# Patient Record
Sex: MALE | Race: WHITE | NOT HISPANIC OR LATINO | Employment: FULL TIME | URBAN - METROPOLITAN AREA
[De-identification: names, ages, dates, MRNs, and addresses within clinical notes are randomized per-mention and may not be internally consistent; named-entity substitution may affect disease eponyms.]

---

## 2017-01-05 ENCOUNTER — OFFICE VISIT (OUTPATIENT)
Dept: INTERNAL MEDICINE | Facility: CLINIC | Age: 57
End: 2017-01-05
Payer: COMMERCIAL

## 2017-01-05 VITALS
HEIGHT: 71 IN | WEIGHT: 190.94 LBS | DIASTOLIC BLOOD PRESSURE: 90 MMHG | SYSTOLIC BLOOD PRESSURE: 144 MMHG | BODY MASS INDEX: 26.73 KG/M2 | OXYGEN SATURATION: 98 % | HEART RATE: 68 BPM

## 2017-01-05 DIAGNOSIS — Z12.11 SCREENING FOR COLON CANCER: ICD-10-CM

## 2017-01-05 DIAGNOSIS — Z12.5 PROSTATE CANCER SCREENING: ICD-10-CM

## 2017-01-05 DIAGNOSIS — D64.9 CHRONIC ANEMIA: ICD-10-CM

## 2017-01-05 DIAGNOSIS — I10 ESSENTIAL HYPERTENSION: Primary | ICD-10-CM

## 2017-01-05 PROCEDURE — 99214 OFFICE O/P EST MOD 30 MIN: CPT | Mod: S$GLB,,, | Performed by: INTERNAL MEDICINE

## 2017-01-05 PROCEDURE — 99999 PR PBB SHADOW E&M-EST. PATIENT-LVL III: CPT | Mod: PBBFAC,,, | Performed by: INTERNAL MEDICINE

## 2017-01-05 PROCEDURE — 3077F SYST BP >= 140 MM HG: CPT | Mod: S$GLB,,, | Performed by: INTERNAL MEDICINE

## 2017-01-05 PROCEDURE — 1159F MED LIST DOCD IN RCRD: CPT | Mod: S$GLB,,, | Performed by: INTERNAL MEDICINE

## 2017-01-05 PROCEDURE — 3080F DIAST BP >= 90 MM HG: CPT | Mod: S$GLB,,, | Performed by: INTERNAL MEDICINE

## 2017-01-05 RX ORDER — METOPROLOL SUCCINATE 25 MG/1
25 TABLET, EXTENDED RELEASE ORAL DAILY
Qty: 90 TABLET | Refills: 3 | Status: SHIPPED | OUTPATIENT
Start: 2017-01-05 | End: 2018-01-26 | Stop reason: SDUPTHER

## 2017-01-05 RX ORDER — HYDROCHLOROTHIAZIDE 25 MG/1
25 TABLET ORAL DAILY
Qty: 30 TABLET | Refills: 11 | Status: SHIPPED | OUTPATIENT
Start: 2017-01-05 | End: 2017-09-11 | Stop reason: SDUPTHER

## 2017-01-05 RX ORDER — LOSARTAN POTASSIUM 100 MG/1
100 TABLET ORAL DAILY
Qty: 90 TABLET | Refills: 3 | Status: SHIPPED | OUTPATIENT
Start: 2017-01-05 | End: 2017-12-08 | Stop reason: SDUPTHER

## 2017-01-05 NOTE — PATIENT INSTRUCTIONS
Hydrochlorothiazide Oral capsule  · For the first week on this take 1/2 tablet once daily  · If side effect develops on 1/2 tablet, wait an additional week for it to go away and then increase the dose   · After 1 week if no side effects start taking 1 whole tablet once daily     Watching blood pressure    · Check blood pressure daily for 2 weeks and then submit the numbers on patient portal.    · Wait until you have been on the full strength of HCTZ for at least 2 weeks      · The best time is first thing in the morning AND just prior to bedtime.    · Keep a log of your blood pressure.  Write down the date and time and the blood pressure number.      · Comment next to any abnormally high blood pressure numbers if you can tell that there was a circumstance that led to this value (for example, taking blood pressure when you were upset, in pain or right after having a cigarette or drinking coffee).     Avoid common mistakes when checking blood pressure  · Taking blood pressure over the top of clothing.  · Checking blood pressure repeatedly on the same arm (better to check on the other arm)  · Using blood pressure cuff that is too small (inflatable portion should cover at least 75% of your upper arm)    GOAL BLOOD PRESSURE:   Top number less than 140  Bottom number less than 90       What is this medicine?  HYDROCHLOROTHIAZIDE (crow droe klor oh THYE a zide) is a diuretic. It increases the amount of urine passed, which causes the body to lose salt and water. This medicine is used to treat high blood pressure. It is also reduces the swelling and water retention caused by various medical conditions, such as heart, liver, or kidney disease.  This medicine may be used for other purposes; ask your health care provider or pharmacist if you have questions.  What should I tell my health care provider before I take this medicine?  They need to know if you have any of these conditions:  · diabetes  · gout  · immune system  problems, like lupus  · kidney disease or kidney stones  · liver disease  · pancreatitis  · small amount of urine or difficulty passing urine  · an unusual or allergic reaction to hydrochlorothiazide, sulfa drugs, other medicines, foods, dyes, or preservatives    How should I use this medicine?  Take this medicine by mouth with a glass of water. Follow the directions on the prescription label. Take your medicine at regular intervals. Remember that you will need to pass urine frequently after taking this medicine. Do not take your doses at a time of day that will cause you problems. Do not stop taking your medicine unless your doctor tells you to.  Talk to your pediatrician regarding the use of this medicine in children. Special care may be needed.  Overdosage: If you think you have taken too much of this medicine contact a poison control center or emergency room at once.  NOTE: This medicine is only for you. Do not share this medicine with others.  What if I miss a dose?  If you miss a dose, take it as soon as you can. If it is almost time for your next dose, take only that dose. Do not take double or extra doses.    What should I watch for while using this medicine?  Visit your doctor or health care professional for regular checks on your progress. Check your blood pressure as directed. Ask your doctor or health care professional what your blood pressure should be and when you should contact him or her.  You may need to be on a special diet while taking this medicine. Ask your doctor.  Check with your doctor or health care professional if you get an attack of severe diarrhea, nausea and vomiting, or if you sweat a lot. The loss of too much body fluid can make it dangerous for you to take this medicine.  You may get drowsy or dizzy. Do not drive, use machinery, or do anything that needs mental alertness until you know how this medicine affects you. Do not stand or sit up quickly, especially if you are an older  patient. This reduces the risk of dizzy or fainting spells. Alcohol may interfere with the effect of this medicine. Avoid alcoholic drinks.  This medicine may affect your blood sugar level. If you have diabetes, check with your doctor or health care professional before changing the dose of your diabetic medicine.  This medicine can make you more sensitive to the sun. Keep out of the sun. If you cannot avoid being in the sun, wear protective clothing and use sunscreen. Do not use sun lamps or tanning beds/booths.  What side effects may I notice from receiving this medicine?  Side effects that you should report to your doctor or health care professional as soon as possible:  · allergic reactions such as skin rash or itching, hives, swelling of the lips, mouth, tongue, or throat  · changes in vision  · chest pain  · eye pain  · fast or irregular heartbeat  · feeling faint or lightheaded, falls  · gout attack  · muscle pain or cramps  · pain or difficulty when passing urine  · pain, tingling, numbness in the hands or feet  · redness, blistering, peeling or loosening of the skin, including inside the mouth  · unusually weak or tired  Side effects that usually do not require medical attention (report to your doctor or health care professional if they continue or are bothersome):  · change in sex drive or performance  · dry mouth  · headache  · stomach upset  This list may not describe all possible side effects. Call your doctor for medical advice about side effects. You may report side effects to FDA at 0-855-FDA-7926.  Where should I keep my medicine?  Keep out of the reach of children.  Store at room temperature between 15 and 30 degrees C (59 and 86 degrees F). Do not freeze. Protect from light and moisture. Keep container closed tightly. Throw away any unused medicine after the expiration date.  NOTE:This sheet is a summary. It may not cover all possible information. If you have questions about this medicine, talk to  your doctor, pharmacist, or health care provider. Copyright© 2016 [employer]

## 2017-01-05 NOTE — MR AVS SNAPSHOT
[facility]   [place]  [place] [address]  Phone: [phone]  Fax: [phone]                  Domingo Gross .   2017 4:20 PM   Office Visit    Description:  Male : 1960   Provider:  Sawyer Jones MD   Department:  [facility]           Reason for Visit     Medication Refill           Diagnoses this Visit        Comments    Essential hypertension    -  Primary     Chronic anemia         Prostate cancer screening                To Do List           Future Appointments        Provider Department Dept Phone    2017 9:30 AM [facility] [phone]    2017 8:00 AM Sawyer Jones MD [facility] [phone]      Goals (5 Years of Data)     None       These Medications        Disp Refills Start End    hydrochlorothiazide (HYDRODIURIL) 25 MG tablet 30 tablet 11 2017    Take 1 tablet (25 mg total) by mouth once daily. - Oral    Pharmacy: [facility] [address] [place] [address] [place] [address] AT [facility] #: [phone]         [facility]     [facility] -  Assistance  Registered nurses in the [facility] provide clinical advisement, health education, appointment booking, and other advisory services.  Call for this free service at [phone].             Medications           Message regarding Medications     Verify the changes and/or additions to your medication regime listed below are the same as discussed with your clinician today.  If any of these changes or additions are incorrect, please notify your healthcare provider.        START taking these NEW medications        Refills    hydrochlorothiazide (HYDRODIURIL) 25 MG tablet 11    Sig: Take 1 tablet (25 mg total) by mouth once daily.    Class: Normal    Route: Oral           Verify that the below list of medications is an accurate  "representation of the medications you are currently taking.  If none reported, the list may be blank. If incorrect, please contact your healthcare provider. Carry this list with you in case of emergency.           Current Medications     ASPIRIN (ASPIR-81 ORAL) Take 1 tablet by mouth.    atorvastatin (LIPITOR) 40 MG tablet Take 1 tablet (40 mg total) by mouth once daily.    clopidogrel (PLAVIX) 75 mg tablet Take 1 tablet (75 mg total) by mouth once daily.    losartan (COZAAR) 100 MG tablet Take 1 tablet (100 mg total) by mouth once daily.    metoprolol succinate (TOPROL-XL) 25 MG 24 hr tablet Take 1 tablet (25 mg total) by mouth once daily.    coenzyme Q10 (COQ-10) 100 mg capsule Take 1 capsule (100 mg total) by mouth once daily.    hydrochlorothiazide (HYDRODIURIL) 25 MG tablet Take 1 tablet (25 mg total) by mouth once daily.           Clinical Reference Information           Vital Signs - Last Recorded  Most recent update: 1/5/2017  4:26 PM by Evelyn Asif MA    BP Pulse Ht Wt SpO2 BMI    (!) 144/90 (BP Location: Right arm, Patient Position: Sitting, BP Method: Manual) 68 5' 11" (1.803 m) 86.6 kg (190 lb 14.7 oz) 98% 26.63 kg/m2      Blood Pressure          Most Recent Value    BP  (!)  144/90      Allergies as of 1/5/2017     Ace Inhibitors      Immunizations Administered on Date of Encounter - 1/5/2017     None      Orders Placed During Today's Visit     Future Labs/Procedures Expected by Expires    CBC auto differential  1/5/2017 4/5/2017    Comprehensive metabolic panel  1/5/2017 4/5/2017    Ferritin  1/5/2017 3/6/2018    Iron and TIBC  1/5/2017 4/5/2017    Lipid panel  1/5/2017 4/5/2017    PSA, Screening  1/5/2017 4/5/2017    Reticulocytes  1/5/2017 4/5/2017      Instructions      Hydrochlorothiazide Oral capsule  · For the first week on this take 1/2 tablet once daily  · If side effect develops on 1/2 tablet, wait an additional week for it to go away and then increase the dose   · After 1 week if no side " effects start taking 1 whole tablet once daily     Watching blood pressure    · Check blood pressure daily for 2 weeks and then submit the numbers on patient portal.    · Wait until you have been on the full strength of HCTZ for at least 2 weeks      · The best time is first thing in the morning AND just prior to bedtime.    · Keep a log of your blood pressure.  Write down the date and time and the blood pressure number.      · Comment next to any abnormally high blood pressure numbers if you can tell that there was a circumstance that led to this value (for example, taking blood pressure when you were upset, in pain or right after having a cigarette or drinking coffee).     Avoid common mistakes when checking blood pressure  · Taking blood pressure over the top of clothing.  · Checking blood pressure repeatedly on the same arm (better to check on the other arm)  · Using blood pressure cuff that is too small (inflatable portion should cover at least 75% of your upper arm)    GOAL BLOOD PRESSURE:   Top number less than 140  Bottom number less than 90       What is this medicine?  HYDROCHLOROTHIAZIDE (crow droe klor oh THYE a zide) is a diuretic. It increases the amount of urine passed, which causes the body to lose salt and water. This medicine is used to treat high blood pressure. It is also reduces the swelling and water retention caused by various medical conditions, such as heart, liver, or kidney disease.  This medicine may be used for other purposes; ask your health care provider or pharmacist if you have questions.  What should I tell my health care provider before I take this medicine?  They need to know if you have any of these conditions:  · diabetes  · gout  · immune system problems, like lupus  · kidney disease or kidney stones  · liver disease  · pancreatitis  · small amount of urine or difficulty passing urine  · an unusual or allergic reaction to hydrochlorothiazide, sulfa drugs, other medicines, foods,  dyes, or preservatives    How should I use this medicine?  Take this medicine by mouth with a glass of water. Follow the directions on the prescription label. Take your medicine at regular intervals. Remember that you will need to pass urine frequently after taking this medicine. Do not take your doses at a time of day that will cause you problems. Do not stop taking your medicine unless your doctor tells you to.  Talk to your pediatrician regarding the use of this medicine in children. Special care may be needed.  Overdosage: If you think you have taken too much of this medicine contact a poison control center or emergency room at once.  NOTE: This medicine is only for you. Do not share this medicine with others.  What if I miss a dose?  If you miss a dose, take it as soon as you can. If it is almost time for your next dose, take only that dose. Do not take double or extra doses.    What should I watch for while using this medicine?  Visit your doctor or health care professional for regular checks on your progress. Check your blood pressure as directed. Ask your doctor or health care professional what your blood pressure should be and when you should contact him or her.  You may need to be on a special diet while taking this medicine. Ask your doctor.  Check with your doctor or health care professional if you get an attack of severe diarrhea, nausea and vomiting, or if you sweat a lot. The loss of too much body fluid can make it dangerous for you to take this medicine.  You may get drowsy or dizzy. Do not drive, use machinery, or do anything that needs mental alertness until you know how this medicine affects you. Do not stand or sit up quickly, especially if you are an older patient. This reduces the risk of dizzy or fainting spells. Alcohol may interfere with the effect of this medicine. Avoid alcoholic drinks.  This medicine may affect your blood sugar level. If you have diabetes, check with your doctor or  health care professional before changing the dose of your diabetic medicine.  This medicine can make you more sensitive to the sun. Keep out of the sun. If you cannot avoid being in the sun, wear protective clothing and use sunscreen. Do not use sun lamps or tanning beds/booths.  What side effects may I notice from receiving this medicine?  Side effects that you should report to your doctor or health care professional as soon as possible:  · allergic reactions such as skin rash or itching, hives, swelling of the lips, mouth, tongue, or throat  · changes in vision  · chest pain  · eye pain  · fast or irregular heartbeat  · feeling faint or lightheaded, falls  · gout attack  · muscle pain or cramps  · pain or difficulty when passing urine  · pain, tingling, numbness in the hands or feet  · redness, blistering, peeling or loosening of the skin, including inside the mouth  · unusually weak or tired  Side effects that usually do not require medical attention (report to your doctor or health care professional if they continue or are bothersome):  · change in sex drive or performance  · dry mouth  · headache  · stomach upset  This list may not describe all possible side effects. Call your doctor for medical advice about side effects. You may report side effects to FDA at 9-834-FDA-2597.  Where should I keep my medicine?  Keep out of the reach of children.  Store at room temperature between 15 and 30 degrees C (59 and 86 degrees F). Do not freeze. Protect from light and moisture. Keep container closed tightly. Throw away any unused medicine after the expiration date.  NOTE:This sheet is a summary. It may not cover all possible information. If you have questions about this medicine, talk to your doctor, pharmacist, or health care provider. Copyright© 2016 [employer]

## 2017-01-06 ENCOUNTER — PATIENT MESSAGE (OUTPATIENT)
Dept: INTERNAL MEDICINE | Facility: CLINIC | Age: 57
End: 2017-01-06

## 2017-01-06 NOTE — PROGRESS NOTES
Portions of this note are generated with voice recognition software. Typographical errors may exist.     SUBJECTIVE:    This is a/an 56 y.o. male here for primary care visit for  Chief Complaint   Patient presents with    Medication Refill    Hypertension     Hypertension.  Patient states that he is compliant with medical therapy.  He checks blood pressure but has not been doing this recently.  Patient has never been on thiazide diuretic that he can remember.  He denies significant lower urinary tract symptoms.    Chronic anemia.  Patient denies melanotic stools or hematochezia.  States that he was unaware about microcytic anemia.  Colonoscopy canceled in the spring for unspecified reasons.  Patient on Plavix.  No clear reason that patient could not suspend Plavix as needed.    Medications Reviewed and Updated    Past medical, family, and social histories were reviewed and updated.    Review of Systems negative unless noted otherwise in history of present illness-  General ROS: negative  Psychological ROS: negative  Hematological and Lymphatic ROS: negative  Cardiovascular ROS: negative  Gastrointestinal ROS: negative    Allergic:    Review of patient's allergies indicates:   Allergen Reactions    Ace inhibitors Rash       OBJECTIVE:  BP: (!) 144/90 Pulse: 68    Wt Readings from Last 3 Encounters:   01/05/17 86.6 kg (190 lb 14.7 oz)   02/29/16 84.8 kg (187 lb)   12/11/15 82.1 kg (181 lb)    Body mass index is 26.63 kg/(m^2).  Previous Blood Pressure Readings :   BP Readings from Last 3 Encounters:   01/05/17 (!) 144/90   02/29/16 (!) 143/86   12/11/15 (!) 142/84     GEN: No apparent distress  HEENT: sclera non-icteric, conjunctiva clear  CV: no peripheral edema regular rate and rhythm  PULM: breathing non-labored  ABD: non protuberant abdomen.  PSYCH: appropriate affect  MSK: able to rise from chair without assistance  SKIN: normal skin turgor    Pertinent Labs Reviewed       ASSESSMENT/PLAN:    Hypertension  Not  optimally controlled.  Recommend patient start hydrochlorothiazide 25 mg.  Continue remainder of blood pressure regimen.  Counseling on home blood pressure monitoring.  Patient will share this information in the next few weeks.    Chronic microcytic anemia.  Not optimally controlled.  Etiology concerning for chronic blood loss.  Repeat anemia blood work today.    Colon cancer screening.  Not optimally controlled.  Referral again for colonoscopy.    Future Appointments  Date Time Provider Department Center   1/7/2017 9:30 AM [facility]   7/7/2017 8:00 AM Sawyer Jones MD [facility]       Sawyer Jones  1/6/2017  1:57 PM

## 2017-01-07 ENCOUNTER — LAB VISIT (OUTPATIENT)
Dept: LAB | Facility: HOSPITAL | Age: 57
End: 2017-01-07
Attending: INTERNAL MEDICINE
Payer: COMMERCIAL

## 2017-01-07 ENCOUNTER — PATIENT MESSAGE (OUTPATIENT)
Dept: CARDIOLOGY | Facility: CLINIC | Age: 57
End: 2017-01-07

## 2017-01-07 ENCOUNTER — PATIENT MESSAGE (OUTPATIENT)
Dept: INTERNAL MEDICINE | Facility: CLINIC | Age: 57
End: 2017-01-07

## 2017-01-07 DIAGNOSIS — Z12.5 PROSTATE CANCER SCREENING: ICD-10-CM

## 2017-01-07 DIAGNOSIS — D64.9 CHRONIC ANEMIA: ICD-10-CM

## 2017-01-07 DIAGNOSIS — Z11.59 NEED FOR HEPATITIS C SCREENING TEST: ICD-10-CM

## 2017-01-07 DIAGNOSIS — I10 ESSENTIAL HYPERTENSION: ICD-10-CM

## 2017-01-07 LAB
ALBUMIN SERPL BCP-MCNC: 2.7 G/DL
ALP SERPL-CCNC: 80 U/L
ALT SERPL W/O P-5'-P-CCNC: 17 U/L
ANION GAP SERPL CALC-SCNC: 7 MMOL/L
AST SERPL-CCNC: 22 U/L
BASOPHILS # BLD AUTO: 0.05 K/UL
BASOPHILS NFR BLD: 0.9 %
BILIRUB SERPL-MCNC: 0.3 MG/DL
BUN SERPL-MCNC: 12 MG/DL
CALCIUM SERPL-MCNC: 8.7 MG/DL
CHLORIDE SERPL-SCNC: 107 MMOL/L
CHOLEST/HDLC SERPL: 3.3 {RATIO}
CO2 SERPL-SCNC: 27 MMOL/L
COMPLEXED PSA SERPL-MCNC: 0.67 NG/ML
CREAT SERPL-MCNC: 1 MG/DL
DIFFERENTIAL METHOD: ABNORMAL
EOSINOPHIL # BLD AUTO: 0.3 K/UL
EOSINOPHIL NFR BLD: 4.6 %
ERYTHROCYTE [DISTWIDTH] IN BLOOD BY AUTOMATED COUNT: 12.4 %
EST. GFR  (AFRICAN AMERICAN): >60 ML/MIN/1.73 M^2
EST. GFR  (NON AFRICAN AMERICAN): >60 ML/MIN/1.73 M^2
FERRITIN SERPL-MCNC: 32 NG/ML
GLUCOSE SERPL-MCNC: 104 MG/DL
HCT VFR BLD AUTO: 38.7 %
HDL/CHOLESTEROL RATIO: 30.1 %
HDLC SERPL-MCNC: 166 MG/DL
HDLC SERPL-MCNC: 50 MG/DL
HGB BLD-MCNC: 13.3 G/DL
IRON SERPL-MCNC: 79 UG/DL
LDLC SERPL CALC-MCNC: 88.6 MG/DL
LYMPHOCYTES # BLD AUTO: 1.8 K/UL
LYMPHOCYTES NFR BLD: 32.3 %
MCH RBC QN AUTO: 28.5 PG
MCHC RBC AUTO-ENTMCNC: 34.4 %
MCV RBC AUTO: 83 FL
MONOCYTES # BLD AUTO: 0.5 K/UL
MONOCYTES NFR BLD: 8.7 %
NEUTROPHILS # BLD AUTO: 2.9 K/UL
NEUTROPHILS NFR BLD: 53.3 %
NONHDLC SERPL-MCNC: 116 MG/DL
PLATELET # BLD AUTO: 228 K/UL
PMV BLD AUTO: 10.6 FL
POTASSIUM SERPL-SCNC: 4.1 MMOL/L
PROT SERPL-MCNC: 6.4 G/DL
RBC # BLD AUTO: 4.66 M/UL
RETICS/RBC NFR AUTO: 1.1 %
SATURATED IRON: 22 %
SODIUM SERPL-SCNC: 141 MMOL/L
TOTAL IRON BINDING CAPACITY: 352 UG/DL
TRANSFERRIN SERPL-MCNC: 238 MG/DL
TRIGL SERPL-MCNC: 137 MG/DL
WBC # BLD AUTO: 5.41 K/UL

## 2017-01-07 PROCEDURE — 85045 AUTOMATED RETICULOCYTE COUNT: CPT

## 2017-01-07 PROCEDURE — 84153 ASSAY OF PSA TOTAL: CPT

## 2017-01-07 PROCEDURE — 36415 COLL VENOUS BLD VENIPUNCTURE: CPT | Mod: PO

## 2017-01-07 PROCEDURE — 83540 ASSAY OF IRON: CPT

## 2017-01-07 PROCEDURE — 82728 ASSAY OF FERRITIN: CPT

## 2017-01-07 PROCEDURE — 80053 COMPREHEN METABOLIC PANEL: CPT

## 2017-01-07 PROCEDURE — 86803 HEPATITIS C AB TEST: CPT

## 2017-01-07 PROCEDURE — 80061 LIPID PANEL: CPT

## 2017-01-07 PROCEDURE — 84466 ASSAY OF TRANSFERRIN: CPT

## 2017-01-07 PROCEDURE — 85025 COMPLETE CBC W/AUTO DIFF WBC: CPT

## 2017-01-08 DIAGNOSIS — I77.9 PERIPHERAL ARTERIAL OCCLUSIVE DISEASE: ICD-10-CM

## 2017-01-08 DIAGNOSIS — E78.5 HYPERLIPIDEMIA, UNSPECIFIED HYPERLIPIDEMIA TYPE: ICD-10-CM

## 2017-01-08 RX ORDER — ATORVASTATIN CALCIUM 40 MG/1
40 TABLET, FILM COATED ORAL DAILY
Qty: 90 TABLET | Refills: 3 | Status: SHIPPED | OUTPATIENT
Start: 2017-01-08 | End: 2017-09-08 | Stop reason: SDUPTHER

## 2017-01-09 LAB — HCV AB SERPL QL IA: NEGATIVE

## 2017-01-09 NOTE — TELEPHONE ENCOUNTER
----- Message from Sawyer Jones MD sent at 1/8/2017  4:56 PM CST -----  Please make sure my most recent letter for the patient is mailed out this week.

## 2017-05-04 ENCOUNTER — HOSPITAL ENCOUNTER (OUTPATIENT)
Dept: RADIOLOGY | Facility: HOSPITAL | Age: 57
Discharge: HOME | End: 2017-05-04
Attending: INTERNAL MEDICINE
Payer: COMMERCIAL

## 2017-05-04 ENCOUNTER — HOSPITAL ENCOUNTER (OUTPATIENT)
Dept: CARDIOLOGY | Facility: HOSPITAL | Age: 57
Discharge: HOME | End: 2017-05-04
Attending: INTERNAL MEDICINE
Payer: COMMERCIAL

## 2017-05-04 ENCOUNTER — OFFICE VISIT (OUTPATIENT)
Dept: CARDIOLOGY | Facility: CLINIC | Age: 57
End: 2017-05-04
Payer: COMMERCIAL

## 2017-05-04 VITALS
HEIGHT: 67 IN | DIASTOLIC BLOOD PRESSURE: 76 MMHG | BODY MASS INDEX: 29.51 KG/M2 | HEART RATE: 58 BPM | SYSTOLIC BLOOD PRESSURE: 136 MMHG | WEIGHT: 188 LBS

## 2017-05-04 DIAGNOSIS — I73.9 CLAUDICATION IN PERIPHERAL VASCULAR DISEASE: Primary | ICD-10-CM

## 2017-05-04 DIAGNOSIS — I73.9 PAD (PERIPHERAL ARTERY DISEASE): ICD-10-CM

## 2017-05-04 DIAGNOSIS — I70.213 ATHEROSCLEROSIS OF NATIVE ARTERY OF BOTH LOWER EXTREMITIES WITH INTERMITTENT CLAUDICATION: ICD-10-CM

## 2017-05-04 DIAGNOSIS — E78.5 HYPERLIPIDEMIA LDL GOAL <70: ICD-10-CM

## 2017-05-04 DIAGNOSIS — I73.9 CLAUDICATION IN PERIPHERAL VASCULAR DISEASE: ICD-10-CM

## 2017-05-04 DIAGNOSIS — I10 ESSENTIAL HYPERTENSION: ICD-10-CM

## 2017-05-04 PROCEDURE — 93924 LWR XTR VASC STDY BILAT: CPT | Mod: 26,,, | Performed by: INTERNAL MEDICINE

## 2017-05-04 PROCEDURE — 75635 CT ANGIO ABDOMINAL ARTERIES: CPT | Mod: 26,,, | Performed by: RADIOLOGY

## 2017-05-04 PROCEDURE — 99214 OFFICE O/P EST MOD 30 MIN: CPT | Mod: S$GLB,,, | Performed by: INTERNAL MEDICINE

## 2017-05-04 PROCEDURE — 3075F SYST BP GE 130 - 139MM HG: CPT | Mod: S$GLB,,, | Performed by: INTERNAL MEDICINE

## 2017-05-04 PROCEDURE — 93924 LWR XTR VASC STDY BILAT: CPT

## 2017-05-04 PROCEDURE — 1160F RVW MEDS BY RX/DR IN RCRD: CPT | Mod: S$GLB,,, | Performed by: INTERNAL MEDICINE

## 2017-05-04 PROCEDURE — 75635 CT ANGIO ABDOMINAL ARTERIES: CPT | Mod: TC

## 2017-05-04 PROCEDURE — 3078F DIAST BP <80 MM HG: CPT | Mod: S$GLB,,, | Performed by: INTERNAL MEDICINE

## 2017-05-04 PROCEDURE — 25500020 PHARM REV CODE 255: Performed by: INTERNAL MEDICINE

## 2017-05-04 PROCEDURE — 99999 PR PBB SHADOW E&M-EST. PATIENT-LVL III: CPT | Mod: PBBFAC,,, | Performed by: INTERNAL MEDICINE

## 2017-05-04 RX ADMIN — IOHEXOL 150 ML: 350 INJECTION, SOLUTION INTRAVENOUS at 10:05

## 2017-05-04 NOTE — PATIENT INSTRUCTIONS
Taking Aspirin for Atherosclerosis       Aspirin is a medicine often prescribed to treat atherosclerosis. This condition affects your arteries. These are the blood vessels that carry blood away from your heart. Having atherosclerosis means youre at greater risk of a heart attack or stroke. Aspirin can help prevent these from happening.  How atherosclerosis affects your arteries   A fatty material (plaque) can build up in your arteries. This makes it harder for blood to flow through them. A blood clot can then form on the plaque. This may block the artery, cutting off blood flow. This can cause conditions such as coronary artery disease (CAD) and peripheral arterial disease (PAD):  · CAD occurs when plaque builds up in the coronary artery. This artery supplies the heart with oxygen-rich blood.  · PAD occurs when plaque forms in leg arteries.  The same things that cause CAD and PAD can also cause plaque to form in other arteries in your body, such as those in the brain. When plaque occurs in any of these arteries, it raises your risk of heart attack or stroke.  What aspirin does  Aspirin is a blood-thinner (antiplatelet medicine). It helps keep blood clots from forming. This reduces the risk of blockage. Aspirin can be taken daily if you are at high risk of or have already had a heart attack or stroke. It is also used after a procedure called a stent placement. This is when a tiny wire mesh tube, or stent, is placed in an artery to keep it open. Aspirin helps prevent blood clots from forming on the stent.  Taking aspirin safely  Tell your healthcare provider about any medicines you are taking. This includes:  · All prescription medicines  · Over-the-counter medicines  · Herbs, vitamins, and other supplements  Also mention if you have a history of ulcers or bleeding problems. Ask whether you will need to stop taking aspirin before having surgery or dental work. Always take medicines as directed.  Tips for taking  aspirin  · Have a routine. For example, take aspirin with the same meal each day.  · Dont take more than prescribed. A low dose gives the same benefit as a higher one, with a lower risk of side effects.  · Dont skip doses. Aspirin needs to be taken each day to work well.  · Keep track of what you take. A pillbox with days of the week can help, especially if you take several medicines. Or use a list or chart to keep track.  When to call your healthcare provider  Side effects of aspirin are not usually serious. If you do have problems, changing your dose may help. Call your healthcare provider if you have any of the following:  · Excessive bruising (some bruising is normal)  · Nosebleeds, bleeding gums, or other excessive bleeding  · An upset stomach or stomach pain   Date Last Reviewed: 6/1/2016  © 8510-4532 [employer]. [address] [place] [address], [place] [address]. All rights reserved. This information is not intended as a substitute for professional medical care. Always follow your healthcare professional's instructions.

## 2017-05-04 NOTE — PROGRESS NOTES
Subjective:   Patient ID:  Domingo Gross Sr. is a 56 y.o. male who presents for follow up of Claudication; Peripheral Arterial Disease; Hypertension; and Hyperlipidemia      HPI:     Domingo Gross Sr. 56 y.o. male is here follow up complaining of bilateral calf claudication, winsome IIb. He is well known to me with a history of L SFA atherectomy with 2.2 Phoenix catheter with PTA using using 6.0 x 100 mm Lutonix DCB in 6/2015.  He had distal aorta iliac reconstruction in 6/2014 with 8.0 x 39 mm BES overlapping with 9.0 x 60 in R EIA and 8.0 x 80 mm SES in L EIA post dilated with 9.0 mm bilaterally. He was well up until recently he notice in increase in claudication. He is on aspirin and plavix. He is very active.      ELISABETH with stress 5/2017:   R 1.01 to 0.44   L 0.92 to 0.45    After 6 minutes ambulation      CTA 5/2017:       Patent distal aorta and bilateral GRUPO/EIA stents   L EIA with de shawna 90% stenosis   L SFA 80% with 3 vessel run off     R EIA/CFA with moderate disease   R SFA 80% stenosis with 3 vessel run off            Patient Active Problem List    Diagnosis Date Noted    Left knee pain 01/19/2016    Pain of left lower extremity 01/19/2016    Difficulty walking 01/19/2016    Acute pain of left knee 12/11/2015    Hyperlipidemia LDL goal <70 06/12/2015    DJD (degenerative joint disease), lumbar 05/27/2015    Lumbar facet arthropathy 05/27/2015    DDD (degenerative disc disease) 05/27/2015    Spondylosis without myelopathy 05/27/2015    Limb pain 11/21/2014    History of back injury 11/21/2014     20 years ago a bag fell on his back at work      Myalgia 11/21/2014    Claudication in peripheral vascular disease 06/13/2014    PAD (peripheral artery disease) 05/21/2014     D. SUMMARY:    1. Three vessel runoff below the knee bilaterally.  2. Severe disease of the terminal aorta.  3. Successful PTAS.  4. Bilateral common iliac reconstruction with 8 x 39 mm stent extending in the  aorta  5. Right common illiac was treated with an overlapping 9 x 60 mm SES   6. Left common iliac was treated with an overlapping 8 x 80 mm SES down to external iliac  7. All stents were post dilated with 9.0 x 60 mm balloons  8. Moderate bilateral SFA disease  9. Chronically occluded left internal iliac artery    E. RECOMMENDATIONS:    1. Routine post-cath care.  2. Maximize medical management.  3. Risk factor reductions.  4. ASA 81mg.  5. Clopidogrel (Plavix) for one year.  6. Consider SFA revascularization for peristent claudication          Atherosclerosis of leg with intermittent claudication 2014     Stevie IIB      Elevated TSH 2013    HTN (hypertension) 2013    Elevated fasting blood sugar 2013           Right Arm BP - Sittin/76  Left Arm BP - Sittin/72          LAST HbA1c  Lab Results   Component Value Date    HGBA1C 5.8 2013       Lipid panel  Lab Results   Component Value Date    CHOL 166 2017    CHOL 118 (L) 2015    CHOL 133 2014     Lab Results   Component Value Date    HDL 50 2017    HDL 41 2015    HDL 44 2014     Lab Results   Component Value Date    LDLCALC 88.6 2017    LDLCALC 54.2 (L) 2015    LDLCALC 67.4 2014     Lab Results   Component Value Date    TRIG 137 2017    TRIG 114 2015    TRIG 108 2014     Lab Results   Component Value Date    CHOLHDL 30.1 2017    CHOLHDL 34.7 2015    CHOLHDL 33.1 2014            Review of Systems   Constitution: Negative for diaphoresis, weakness, night sweats, weight gain and weight loss.   HENT: Negative for congestion.    Eyes: Negative for blurred vision, discharge and double vision.   Cardiovascular: Negative for chest pain, claudication, cyanosis, dyspnea on exertion, irregular heartbeat, leg swelling, near-syncope, orthopnea, palpitations, paroxysmal nocturnal dyspnea and syncope.   Respiratory: Negative for cough, shortness  of breath and wheezing.    Endocrine: Negative for cold intolerance, heat intolerance and polyphagia.   Hematologic/Lymphatic: Negative for adenopathy and bleeding problem. Does not bruise/bleed easily.   Skin: Negative for dry skin and nail changes.   Musculoskeletal: Negative for arthritis, back pain, falls, joint pain, myalgias and neck pain.   Gastrointestinal: Negative for bloating, abdominal pain, change in bowel habit and constipation.   Genitourinary: Negative for bladder incontinence, dysuria, flank pain, genital sores and missed menses.   Neurological: Negative for aphonia, brief paralysis, difficulty with concentration and dizziness.   Psychiatric/Behavioral: Negative for altered mental status and memory loss. The patient does not have insomnia.    Allergic/Immunologic: Negative for environmental allergies.       Objective:   Physical Exam   Constitutional: He is oriented to person, place, and time. He appears well-developed and well-nourished. He is not intubated.   HENT:   Head: Normocephalic and atraumatic.   Right Ear: External ear normal.   Left Ear: External ear normal.   Mouth/Throat: Oropharynx is clear and moist.   Eyes: Conjunctivae and EOM are normal. Pupils are equal, round, and reactive to light. Right eye exhibits no discharge. Left eye exhibits no discharge. No scleral icterus.   Neck: Normal range of motion. Neck supple. Normal carotid pulses, no hepatojugular reflux and no JVD present. Carotid bruit is not present. No tracheal deviation present. No thyromegaly present.   Cardiovascular: Normal rate, regular rhythm, S1 normal and S2 normal.   No extrasystoles are present. PMI is not displaced.  Exam reveals no gallop, no S3, no distant heart sounds, no friction rub and no midsystolic click.    No murmur heard.  Pulses:       Carotid pulses are 2+ on the right side, and 2+ on the left side.       Radial pulses are 2+ on the right side, and 2+ on the left side.        Femoral pulses are 2+  on the right side, and 2+ on the left side.       Popliteal pulses are 2+ on the right side, and 2+ on the left side.        Dorsalis pedis pulses are 0 on the right side, and 0 on the left side.        Posterior tibial pulses are 0 on the right side, and 0 on the left side.     Bilateral monophasic bilateral DP and PT doppler signals         Pulmonary/Chest: Effort normal and breath sounds normal. No accessory muscle usage or stridor. No apnea, no tachypnea and no bradypnea. He is not intubated. No respiratory distress. He has no decreased breath sounds. He has no wheezes. He has no rales. He exhibits no tenderness and no bony tenderness.   Abdominal: He exhibits no distension, no pulsatile liver, no abdominal bruit, no ascites, no pulsatile midline mass and no mass. There is no tenderness. There is no rebound and no guarding.   Musculoskeletal: Normal range of motion. He exhibits no edema or tenderness.   Lymphadenopathy:     He has no cervical adenopathy.   Neurological: He is alert and oriented to person, place, and time. He has normal reflexes. No cranial nerve deficit. Coordination normal.   Skin: Skin is warm. No rash noted. No erythema. No pallor.   Psychiatric: He has a normal mood and affect. His behavior is normal. Judgment and thought content normal.       Assessment:     1. Claudication in peripheral vascular disease    2. Hyperlipidemia LDL goal <70    3. PAD (peripheral artery disease)    4. Essential hypertension    5. Atherosclerosis of native artery of both lower extremities with intermittent claudication        Plan:       ELISABETH with stress  CTA with run off      Continue with current medical plan and lifestyle changes.  Return sooner for concerns or questions. If symptoms persist go to the ED  I have reviewed all pertinent data on this patient         After reviewing his CTA and ABIX  He will return for endovascular intervention      L CFA access for L EIA PTAS  Either antegrade CFA or retrograde  pedal access for SFA intervention          Risks, benefits and alternatives of peripheral catheterization and possible intervention were discussed with the patient. All questions were answered and informed consent obtained.   I discussed the importance of compliance with dual antiplatelet therapy with the patient to prevent acute or late stent thrombosis with premature discontinuation of the therapy.          Will call the patient with a date and time for the case            I have reviewed the patient's medical history in detail and updated the computerized patient record.    Orders Placed This Encounter   Procedures    CTA Abdominal Aorta Bilateral Iliofem     Standing Status:   Future     Standing Expiration Date:   5/4/2018     Order Specific Question:   Reason for Exam:     Answer:   assess patency of bilateral iliac stents and native femoral arteries     Order Specific Question:   Is the patient allergic to iodine or contrast? Has a steroid / antihistamine prep been administered?     Answer:   No     Order Specific Question:   Is the patient on ANY Metformin drug such as Glugophage/Glucovance?           Should be off drug 48 hours after contrast. Check renal function before restart.     Answer:   No     Order Specific Question:   Age > 60 years?     Answer:   No     Order Specific Question:   History of Kidney Disease - including: decreased kidney function, dialysis, kidney transplay, single kidney, kidney cancer, kidney surgery?     Answer:   None     Order Specific Question:   Does the patient have high blood pressure requiring medical treatment?     Answer:   Yes     Order Specific Question:   Diabetes?     Answer:   No     Order Specific Question:   May the Radiologist modify the order per protocol to meet the clinical needs of the patient?     Answer:   Yes     Order Specific Question:   Recist criteria?     Answer:   No    Cardiology Lab Ankle Brachial Indices W Stress     Standing Status:   Future      Standing Expiration Date:   5/4/2018       Follow up as scheduled. Return sooner for concerns or questions            He expressed verbal understanding and agreed with the plan        Patient's Medications   New Prescriptions    No medications on file   Previous Medications    ASPIRIN (ASPIR-81 ORAL)    Take 1 tablet by mouth.    ATORVASTATIN (LIPITOR) 40 MG TABLET    Take 1 tablet (40 mg total) by mouth once daily.    CLOPIDOGREL (PLAVIX) 75 MG TABLET    Take 1 tablet (75 mg total) by mouth once daily.    COENZYME Q10 (COQ-10) 100 MG CAPSULE    Take 1 capsule (100 mg total) by mouth once daily.    HYDROCHLOROTHIAZIDE (HYDRODIURIL) 25 MG TABLET    Take 1 tablet (25 mg total) by mouth once daily.    LOSARTAN (COZAAR) 100 MG TABLET    Take 1 tablet (100 mg total) by mouth once daily.    METOPROLOL SUCCINATE (TOPROL-XL) 25 MG 24 HR TABLET    Take 1 tablet (25 mg total) by mouth once daily.   Modified Medications    No medications on file   Discontinued Medications    No medications on file

## 2017-05-05 LAB — VASCULAR ANKLE BRACHIAL INDEX (ABI) LEFT: 0.92 (ref 0.9–1.2)

## 2017-05-11 ENCOUNTER — PATIENT MESSAGE (OUTPATIENT)
Dept: CARDIOLOGY | Facility: CLINIC | Age: 57
End: 2017-05-11

## 2017-05-11 RX ORDER — DIPHENHYDRAMINE HCL 25 MG
50 CAPSULE ORAL ONCE
Status: CANCELLED | OUTPATIENT
Start: 2017-05-11 | End: 2017-05-11

## 2017-05-11 RX ORDER — SODIUM CHLORIDE 9 MG/ML
INJECTION, SOLUTION INTRAVENOUS CONTINUOUS
Status: CANCELLED | OUTPATIENT
Start: 2017-05-11

## 2017-05-18 DIAGNOSIS — I73.9 CLAUDICATION IN PERIPHERAL VASCULAR DISEASE: Primary | ICD-10-CM

## 2017-05-18 RX ORDER — SODIUM CHLORIDE 9 MG/ML
INJECTION, SOLUTION INTRAVENOUS CONTINUOUS
Status: CANCELLED | OUTPATIENT
Start: 2017-05-18

## 2017-05-18 RX ORDER — DIPHENHYDRAMINE HCL 25 MG
50 CAPSULE ORAL ONCE
Status: CANCELLED | OUTPATIENT
Start: 2017-05-18 | End: 2017-05-18

## 2017-05-23 ENCOUNTER — PATIENT MESSAGE (OUTPATIENT)
Dept: CARDIOLOGY | Facility: CLINIC | Age: 57
End: 2017-05-23

## 2017-06-01 NOTE — PRE ADMISSION SCREENING
Called and spoke with pt, arrival time 730am. Verbalizes full understanding to all pre-op instructions and denies questions.

## 2017-06-09 ENCOUNTER — HOSPITAL ENCOUNTER (OUTPATIENT)
Facility: HOSPITAL | Age: 57
Discharge: HOME | End: 2017-06-09
Attending: INTERNAL MEDICINE | Admitting: INTERNAL MEDICINE
Payer: COMMERCIAL

## 2017-06-09 VITALS
OXYGEN SATURATION: 100 % | HEART RATE: 58 BPM | RESPIRATION RATE: 16 BRPM | DIASTOLIC BLOOD PRESSURE: 79 MMHG | BODY MASS INDEX: 26.18 KG/M2 | SYSTOLIC BLOOD PRESSURE: 170 MMHG | HEIGHT: 71 IN | WEIGHT: 187 LBS | TEMPERATURE: 98 F

## 2017-06-09 DIAGNOSIS — I10 ESSENTIAL (PRIMARY) HYPERTENSION: ICD-10-CM

## 2017-06-09 DIAGNOSIS — I73.9 CLAUDICATION IN PERIPHERAL VASCULAR DISEASE: ICD-10-CM

## 2017-06-09 LAB
ANION GAP SERPL CALC-SCNC: 6 MMOL/L
APTT BLDCRRT: 25.6 SEC
BUN SERPL-MCNC: 16 MG/DL
CALCIUM SERPL-MCNC: 9.1 MG/DL
CHLORIDE SERPL-SCNC: 99 MMOL/L
CO2 SERPL-SCNC: 31 MMOL/L
CREAT SERPL-MCNC: 1 MG/DL
ERYTHROCYTE [DISTWIDTH] IN BLOOD BY AUTOMATED COUNT: 12.1 %
EST. GFR  (AFRICAN AMERICAN): >60 ML/MIN/1.73 M^2
EST. GFR  (NON AFRICAN AMERICAN): >60 ML/MIN/1.73 M^2
GLUCOSE SERPL-MCNC: 106 MG/DL
HCT VFR BLD AUTO: 33.5 %
HGB BLD-MCNC: 12.1 G/DL
INR PPP: 1
MCH RBC QN AUTO: 28.6 PG
MCHC RBC AUTO-ENTMCNC: 36.1 %
MCV RBC AUTO: 79 FL
PERIPHERAL STENOSIS: ABNORMAL
PERIPHERAL STENT: YES
PLATELET # BLD AUTO: 223 K/UL
PMV BLD AUTO: 9.6 FL
POTASSIUM SERPL-SCNC: 3.7 MMOL/L
PROTHROMBIN TIME: 10.6 SEC
RBC # BLD AUTO: 4.23 M/UL
SODIUM SERPL-SCNC: 136 MMOL/L
WBC # BLD AUTO: 5.77 K/UL

## 2017-06-09 PROCEDURE — 93010 ELECTROCARDIOGRAM REPORT: CPT | Mod: 76,,, | Performed by: INTERNAL MEDICINE

## 2017-06-09 PROCEDURE — 85730 THROMBOPLASTIN TIME PARTIAL: CPT

## 2017-06-09 PROCEDURE — 27200085 CATH LAB PROCEDURE

## 2017-06-09 PROCEDURE — 93005 ELECTROCARDIOGRAM TRACING: CPT

## 2017-06-09 PROCEDURE — 80048 BASIC METABOLIC PNL TOTAL CA: CPT

## 2017-06-09 PROCEDURE — 85027 COMPLETE CBC AUTOMATED: CPT

## 2017-06-09 PROCEDURE — 99152 MOD SED SAME PHYS/QHP 5/>YRS: CPT | Mod: ,,, | Performed by: INTERNAL MEDICINE

## 2017-06-09 PROCEDURE — 85610 PROTHROMBIN TIME: CPT

## 2017-06-09 PROCEDURE — 37221 CATH LAB PROCEDURE: CPT | Mod: LT,,, | Performed by: INTERNAL MEDICINE

## 2017-06-09 PROCEDURE — 75716 ARTERY X-RAYS ARMS/LEGS: CPT | Mod: 26,59,, | Performed by: INTERNAL MEDICINE

## 2017-06-09 PROCEDURE — 36415 COLL VENOUS BLD VENIPUNCTURE: CPT

## 2017-06-09 PROCEDURE — 63600175 PHARM REV CODE 636 W HCPCS

## 2017-06-09 PROCEDURE — 25000003 PHARM REV CODE 250

## 2017-06-09 PROCEDURE — 25500020 PHARM REV CODE 255

## 2017-06-09 PROCEDURE — 25000003 PHARM REV CODE 250: Performed by: INTERNAL MEDICINE

## 2017-06-09 PROCEDURE — 75625 CONTRAST EXAM ABDOMINL AORTA: CPT | Mod: 26,,, | Performed by: INTERNAL MEDICINE

## 2017-06-09 RX ORDER — SODIUM CHLORIDE 9 MG/ML
INJECTION, SOLUTION INTRAVENOUS CONTINUOUS
Status: DISCONTINUED | OUTPATIENT
Start: 2017-06-09 | End: 2017-06-09 | Stop reason: HOSPADM

## 2017-06-09 RX ORDER — SODIUM CHLORIDE 9 MG/ML
INJECTION, SOLUTION INTRAVENOUS CONTINUOUS
Status: DISCONTINUED | OUTPATIENT
Start: 2017-06-09 | End: 2017-06-09

## 2017-06-09 RX ORDER — DIPHENHYDRAMINE HCL 50 MG
50 CAPSULE ORAL ONCE
Status: DISCONTINUED | OUTPATIENT
Start: 2017-06-09 | End: 2017-06-09 | Stop reason: HOSPADM

## 2017-06-09 RX ADMIN — SODIUM CHLORIDE: 0.9 INJECTION, SOLUTION INTRAVENOUS at 11:06

## 2017-06-09 RX ADMIN — LIDOCAINE HYDROCHLORIDE 1 ML: 10; .005 INJECTION, SOLUTION EPIDURAL; INFILTRATION; INTRACAUDAL; PERINEURAL at 02:06

## 2017-06-09 RX ADMIN — SODIUM CHLORIDE: 0.9 INJECTION, SOLUTION INTRAVENOUS at 08:06

## 2017-06-09 NOTE — NURSING
Dr. Jenkins informed that oozing is coming from left groin site.  MD aware and will come inject with epi & lidocaine

## 2017-06-09 NOTE — DISCHARGE INSTRUCTIONS
No driving the day of procedure  Remove dressing tomorrow after shower.  May apply bandaid for 2 days  If radial, do not use arm at all for remainder of day  No strenuous activity or exercise for 3 days following procedure  No tub bath, Do not submerge wound in water for 3 days  Do not life anything over 5lbs for 3 days after procedure  If site swells or bleeds, hold direct pressure to area for 10 full minutes.  (if site continues to bleed, have someone bring you to the ER)

## 2017-06-09 NOTE — NURSING
Dr. Jenkins at bedside injecting groin site with lidocaine & epi for track oozing.  Oozing stopped once procedure complete.

## 2017-06-09 NOTE — NURSING
Pt walked around unit per MD order.  Left groin  site assessed no hematoma or bleeding noted.  Gauze dressing CDI. Right radial site CDI, no sign of hematoma or bleeding.  Pt d/c per MD order.  PIV taken out.  Tip in tact.  VSS. Discharge instructions given to patient.  Verbalized full instructions. All questions answered.    Pt d/c home via wheelchair to private vehicle.

## 2017-06-09 NOTE — LETTER
June 9, 2017         [address] [place] [address] [place] [address]  Phone: [phone]  Fax: [phone]       Patient: Domingo Gross   YOB: 1960  Date of Visit: 06/09/2017    To Whom It May Concern:    Domingo Stahl was at [facility] on 06/09/2017. He may safely return to work on 6/13/2017 without restrictions. If you have any questions or concerns, or if I can be of further assistance, please do not hesitate to contact me.    Sincerely,    Keo Jenkins MD

## 2017-06-09 NOTE — PROGRESS NOTES
S/p peripheral angiogram for claudication          Patient Active Problem List    Diagnosis Date Noted    Left knee pain 01/19/2016    Pain of left lower extremity 01/19/2016    Difficulty walking 01/19/2016    Acute pain of left knee 12/11/2015    Hyperlipidemia LDL goal <70 06/12/2015    DJD (degenerative joint disease), lumbar 05/27/2015    Lumbar facet arthropathy 05/27/2015    DDD (degenerative disc disease) 05/27/2015    Spondylosis without myelopathy 05/27/2015    Limb pain 11/21/2014    History of back injury 11/21/2014     20 years ago a bag fell on his back at work      Myalgia 11/21/2014    Claudication in peripheral vascular disease 06/13/2014    PAD (peripheral artery disease) 05/21/2014 6/13/2014      1. Three vessel runoff below the knee bilaterally.  2. Severe disease of the terminal aorta.  3. Successful PTAS.  4. Bilateral common iliac reconstruction with 8 x 39 mm stent extending in the aorta  5. Right common illiac was treated with an overlapping 9 x 60 mm SES   6. Left common iliac was treated with an overlapping 8 x 80 mm SES down to external iliac  7. All stents were post dilated with 9.0 x 60 mm balloons  8. Moderate bilateral SFA disease  9. Chronically occluded left internal iliac artery        6/12/2015      1. 80% mid left SFA with a 20 mmHg resting mean gradient  2. Atherectomy with 2.2 Phonenix Volcano catheter  3. PTA with 6.0 x 100 mm Lutonix drug coated balloon        6/9/2017      Patent bilateral GRUPO/EIA stents  L EIA PTAS 9.0 x 80 mm Life stent post dilated with 8.0 mm balloon  Bilateral 70-80% SFA stenosis with 3 vessel run off              Atherosclerosis of leg with intermittent claudication 04/21/2014     Stevie IIB      Elevated TSH 05/29/2013    HTN (hypertension) 04/29/2013    Elevated fasting blood sugar 04/29/2013           Full report to follow

## 2017-06-11 ENCOUNTER — PATIENT MESSAGE (OUTPATIENT)
Dept: CARDIOLOGY | Facility: CLINIC | Age: 57
End: 2017-06-11

## 2017-06-12 LAB
POC ACTIVATED CLOTTING TIME K: 183 SEC (ref 74–137)
POC ACTIVATED CLOTTING TIME K: 235 SEC (ref 74–137)
SAMPLE: ABNORMAL
SAMPLE: ABNORMAL

## 2017-06-17 NOTE — DISCHARGE SUMMARY
[facility]  Discharge Summary      Admit Date: 6/9/2017    Discharge Date and Time: 6/9/2017    Attending Physician: Keo Jenkins    Reason for Admission: claudication    Procedures Performed: Procedure(s) (LRB):  AORTOGRAM WITH RUNOFF (N/A)    Hospital Course       Peripheral Stenosis >= 50%    Peripheral Stent Yes    Resulting Agency [facility]   Narrative        Date of Procedure:  06/09/2017    A. Indication/Pre-Operative Diagnosis     The patient is a 56 year old male that was referred for catheterization by Sawyer Jones for claudication.     B. Summary/Post-Operative Diagnosis       Patent bilateral GRUPO/EIA stents.    De shawna 80% left EIA stenosis treated with 9.0 x 80 mm Lifestar  stent post dilated with 8.0 mm balloon.    Bilateral 70-80% SFA stenosis with 3 vessel run off.    C. HPI     I have reviewed the history and physical completed on 06/09/2017. The patient has been examined and the following changes have been noted:        Patient ID:  Domingo Gross Sr. is a 56 y.o. male who presents for follow up of Claudication; Peripheral Arterial Disease; Hypertension; and Hyperlipidemia        HPI:      Domingo Gross Sr. 56 y.o. male is here follow up complaining of bilateral calf claudication, winsome IIb. He is well known to me with a history of L SFA atherectomy with 2.2 Phoenix catheter with PTA using using 6.0 x 100 mm Lutonix DCB in 6/2015.    He had distal aorta iliac reconstruction in 6/2014 with 8.0 x 39 mm BES overlapping with 9.0 x 60 in R EIA and 8.0 x 80 mm SES in L EIA post dilated with 9.0 mm bilaterally. He was well up until recently he notice in increase in claudication. He is on   aspirin and plavix. He is very active.        ELISABETH with stress 5/2017:                        R 1.01 to 0.44                        L 0.92 to 0.45                         After 6 minutes ambulation        CTA 5/2017:                              Patent distal aorta and bilateral  GRUPO/EIA stents                        L EIA with de shawna 90% stenosis                        L SFA 80% with 3 vessel run off                           R EIA/CFA with moderate disease                        R SFA 80% stenosis with 3 vessel run off    Patient history was obtained from the patient.     Counseling: The patient was counseled regarding the potential risks and benefits of this procedure, as well as possible alternative approaches to the problem and gave informed consent.    The ASA Score was Class II.     [facility] Risk Score for MACE is 1.80%. [facility] Risk Score for Death is 0.20%.     The patient had a previous angiogram at an outside facility. The films were available for review.     Height: 71 in.      Weight: 187 lbs.      BMI: 26.10 kg/m2    OUTPATIENT MEDICATIONS: Medications were reviewed.  ALLERGIES: Allergies were reviewed.    Laboratory data revealed:     06/09/2017 BUN  16, CREAT  1.0, GLU  106, HCT  33.5, HGB  12.1, INR  1.0, K  3.7, NA  136, PLT  223, PTI  10.6, WBCIR  5.77, APTT  25.6            Manual Labs:  ACT Reference Range:  sec, ACT-PLUS cartridge Cardiopulmonary Bypass: 410-440 sec, ACT-LR cartridge Indwelling Vascular sheath Removal:  sec  06/09/2017 09:28  , 09:44        D. Hemodynamic Results       Air rest:  LFA: 126/65 (91)  FREDDY: 144/67 (98)  LCFA: 80/48 (63)    E. Angiographic Results     Diagnostic:        - Abdominal Aorta:             The supra renal abdominal aorta is normal.             The infra renal abdominal aorta is normal.     - Left Renal Artery:             The left renal has luminal irregularities.     - Left Accessory Renal Artery:             The left acc renal has luminal irregularities.     - Right Renal Artery:             The right renal is normal.     - Left Common Iliac Artery:             The left GRUPO is normal. Patent stent     - Right Common Iliac Artery:             The right GRUPO is normal. Patent stent     - Left  Internal Iliac Artery:             The left IIA has a 99% stenosis.     - Right Internal Iliac Artery:             The right IIA has a 70% stenosis.     - Left External Iliac Artery:             The left EIA has a 80% stenosis. 7 mmHg resting mean gradient and 22 mmHg hyperemic mean gradient after 400 mcg of IA nitroglycerin     - Right External Iliac Artery:             The right EIA has luminal irregularities.     - Left Common Femoral Artery:             The left CFA has luminal irregularities.     - Right Common Femoral Artery:             The right CFA has luminal irregularities.     - Left Superficial Femoral Artery:             The mid left SFA has a 75% stenosis.     - Right Superficial Femoral Artery:             The mid right SFA has a 80% stenosis.     - Left Profunda Femoral Artery:             The left PFA is normal.     - Right Profunda Femoral Artery:             The right PFA has luminal irregularities.     - Left Popliteal Artery:             The left POP has luminal irregularities.     - Right Popliteal Artery:             The right POP has luminal irregularities.     - Left Infrapopliteal Artery:             The left infrapopliteal is normal.     - Right Infrapopliteal Artery:             The right infrapopliteal is normal.     - Left Tibioperoneal Trunk:             The left TIBTRUNK is normal.     - Right Tibioperoneal Trunk:             The right TIBTRUNK is normal.     - Left Anterior Tibial Artery:             The left AT is normal.     - Right Anterior Tibial Artery:             The right AT is normal.     - Left Peroneal Artery:             The left peroneal is normal.     - Right Peroneal Artery:             The right peroneal is normal.     - Left Posterior Tibial Artery:             The left PT is normal.     - Right Posterior Tibial Artery:             The right PT is normal.      Intervention          Left EIA:              The lesion was successfully intervened. Post-stenosis of 0%.  The vessel was accessed natively.  The following items were used: Blln Ultraverse .035 [id number], Stent Lifestar [id number] and Blln Ultraverse 035 [id number].    F. Details of Procedure     PROCEDURES PERFORMED: Placement of Closure Device, Iliac Stent, Iliac PTA - Arterial and AO gram W/ Nate Runoff    ANESTHESIA: Conscious sedation was achieved with 100 mcg of FENTANYL 100MCG/2ML and 3.5 mg of Versed. Local anesthesia was achieved with 20 ml of Lidocaine 1%. Moderate conscious sedation was performed and cardiorespiratory functions were monitored the   entire procedure by Jaleesa Banuelos RN. Sedation began at 08:58 AM and concluded at 09:56 AM, totalling 57 minutes.     PRIMARY SURGEON: Keo Jenkins MD    COMPLICATIONS: There were no complications.    Medications given on sterile field: Lidocaine 1% (20 ml).    Medications given during procedure: Heparin 1000u/500cc Bag (3 bags), Nitroglycerine (tridil) 5mg/ml (1250 mcg), Heparin 1000u/ml Inj (8750 units), Verapamil Inj 2ml / 5 Mg (1.25 mg), Versed (3.5 mg), Fentanyl 100mcg/2ml (100 mcg) and Nitroglycerine   200mcg/ml (400 mcg).    The patient was brought to the catheterization laboratory after premedication with oral Benadryl 50 mg. Bilateral groin prepped and draped. The Kit, Manifold was flushed and connected to contrast. Right radial prepped and draped. A Cover Band Bag 40 X 40   was applied to the. After local anesthesia, a Sheath 5/6 Fr Glide Slender was inserted into the right Radial artery.     ABDOMINAL AORTA    A Wire Bentson 035 X 260 was inserted into the infra renal abdominal aorta. A Cath 5fr Pigtail 125cm was inserted into the infra renal abdominal aorta. The Wire Bentson 035 X 260 was removed. Angiography performed in multiple views in the infra renal   abdominal aorta. Bilateral runoff performed. Angiography performed in the supra renal abdominal aorta. The Cath 5fr Pigtail 125cm was removed.     Left  SFA    A Cath 5fr Mp 125cm was inserted into the  proximal left SFA. Hemodynamics recorded in the proximal left SFA.     Left  EIA    The Cath 5fr Mp 125cm was repositioned into the left EIA. Hemodynamics recorded in the left EIA. 400.0 mcg of Nitroglycerine 200mcg/ml was injected intraarterial per MD. A Sheath 4fr X 7cm Micropuncture was inserted into the left femoral artery. The   Sheath 4fr X 7cm Micropuncture was removed. A Sheath Brite Tip 6fr X 5.5cm was inserted into the left femoral artery. A Blln Ultraverse .035 [id number] was inserted into the left EIA and was inflated with indeflator at 6.0 nicolasa. for 35.0 secs. The Blln   Ultraverse .035 [id number] was removed. A Stent Lifestar [id number] was inserted into the left EIA. A Stent Lifestar [id number] was deployed into the left EIA. Delivery system removed. A Blln Ultraverse 035 [id number] was inserted into the left EIA and was   inflated with indeflator at 6.0 nicolasa. for 20.0 secs. The Blln Ultraverse 035 [id number] was removed. Angiography performed in the. The Cath 5fr Mp 125cm was removed. The Cath 5fr Mp 125cm was removed.     The Sheath 5/6 Fr Glide Slender was removed. A Closure Vascband Radial L was applied to the right radial. The Sheath Brite Tip 6fr X 5.5cm was removed. A Closure 6fr Perclose was inserted into the left femoral artery. A Closure 6fr Perclose was deployed   into the left femoral artery. Total Visipaque injected was 175.0 ml. Total Visipaque used was 300.0 ml.       Fluoroscopy Time 10 minutes   Radiation Dose 350 mGy   Contrast Injected 175 ml Visipaque   Contrast Used  300 ml Visipaque            Procedure log documented by RT Peter and verified by Keo Jenkins MD    ESTIMATED BLOOD LOSS is < 50 cc.    SPECIMEN: No specimen.     G. Recommendations     1. Routine post-cath care.  2. Risk factor reductions.  3. ASA 81mg.  4. Statin therapy.  5. Plavix 75 mg daily  6. Consider bilateral SFA intervention for refractory claudication    I certify that I was present for catheter insertion, catheter  manipulation, angiography, angiographic interpretation, and all interventional procedures performed on this patient.               Consults: none    Significant Diagnostic Studies: labs, ecg, and angiogram    Final Diagnoses:    Principal Problem: PAD (peripheral artery disease)   Secondary Diagnoses:       Patient Active Problem List    Diagnosis Date Noted    Left knee pain 01/19/2016    Pain of left lower extremity 01/19/2016    Difficulty walking 01/19/2016    Acute pain of left knee 12/11/2015    Hyperlipidemia LDL goal <70 06/12/2015    DJD (degenerative joint disease), lumbar 05/27/2015    Lumbar facet arthropathy 05/27/2015    DDD (degenerative disc disease) 05/27/2015    Spondylosis without myelopathy 05/27/2015    Limb pain 11/21/2014    History of back injury 11/21/2014     20 years ago a bag fell on his back at work      Myalgia 11/21/2014    Claudication in peripheral vascular disease 06/13/2014    PAD (peripheral artery disease) 05/21/2014 6/13/2014      1. Three vessel runoff below the knee bilaterally.  2. Severe disease of the terminal aorta.  3. Successful PTAS.  4. Bilateral common iliac reconstruction with 8 x 39 mm stent extending in the aorta  5. Right common illiac was treated with an overlapping 9 x 60 mm SES   6. Left common iliac was treated with an overlapping 8 x 80 mm SES down to external iliac  7. All stents were post dilated with 9.0 x 60 mm balloons  8. Moderate bilateral SFA disease  9. Chronically occluded left internal iliac artery        6/12/2015      1. 80% mid left SFA with a 20 mmHg resting mean gradient  2. Atherectomy with 2.2 MusicXraynix Volcano catheter  3. PTA with 6.0 x 100 mm Lutonix drug coated balloon        6/9/2017      Patent bilateral GRUPO/EIA stents  L EIA PTAS 9.0 x 80 mm Life stent post dilated with 8.0 mm balloon  Bilateral 70-80% SFA stenosis with 3 vessel run off              Atherosclerosis of leg with intermittent claudication 04/21/2014      Stevie IIB      Elevated TSH 05/29/2013    HTN (hypertension) 04/29/2013    Elevated fasting blood sugar 04/29/2013         Discharged Condition: stable    Disposition:     DC home      Follow up with PCP      Follow up with Dr. Jenkins or primary cardiologist as scheduled      Cardiac diet      Resume normal activities in 3 days      Follow Up/Patient Instructions:     Medications:        Discharge Medication List as of 6/9/2017 12:06 PM      CONTINUE these medications which have NOT CHANGED    Details   ASPIRIN (ASPIR-81 ORAL) Take 1 tablet by mouth., Starting 12/27/2011, Until Discontinued, Historical Med      atorvastatin (LIPITOR) 40 MG tablet Take 1 tablet (40 mg total) by mouth once daily., Starting 1/8/2017, Until Mon 1/8/18, Normal      clopidogrel (PLAVIX) 75 mg tablet Take 1 tablet (75 mg total) by mouth once daily., Starting 7/27/2016, Until Discontinued, Normal      losartan (COZAAR) 100 MG tablet Take 1 tablet (100 mg total) by mouth once daily., Starting 1/5/2017, Until Discontinued, Normal      metoprolol succinate (TOPROL-XL) 25 MG 24 hr tablet Take 1 tablet (25 mg total) by mouth once daily., Starting 1/5/2017, Until Discontinued, Normal      coenzyme Q10 (COQ-10) 100 mg capsule Take 1 capsule (100 mg total) by mouth once daily., Starting 11/21/2014, Until Sat 11/21/15, Normal      hydrochlorothiazide (HYDRODIURIL) 25 MG tablet Take 1 tablet (25 mg total) by mouth once daily., Starting 1/5/2017, Until Sat 2/4/17, Normal                    No discharge procedures on file.  Follow-up Information     Keo Jenkins MD In 4 weeks.    Specialties:  Cardiology, INTERVENTIONAL CARDIOLOGY  Contact information:  [address]  [address]  [place] [id number] [phone]

## 2017-07-05 ENCOUNTER — PATIENT MESSAGE (OUTPATIENT)
Dept: INTERNAL MEDICINE | Facility: CLINIC | Age: 57
End: 2017-07-05

## 2017-07-05 ENCOUNTER — TELEPHONE (OUTPATIENT)
Dept: INTERNAL MEDICINE | Facility: CLINIC | Age: 57
End: 2017-07-05

## 2017-07-05 NOTE — TELEPHONE ENCOUNTER
Left several messages for pt to reschedule appointment. A portal message has been sent and an appointment letter will be sent out.

## 2017-07-10 ENCOUNTER — TELEPHONE (OUTPATIENT)
Dept: CARDIOLOGY | Facility: CLINIC | Age: 57
End: 2017-07-10

## 2017-07-11 RX ORDER — CLOPIDOGREL BISULFATE 75 MG/1
75 TABLET ORAL DAILY
Qty: 90 TABLET | Refills: 3 | Status: SHIPPED | OUTPATIENT
Start: 2017-07-11 | End: 2018-04-20 | Stop reason: SDUPTHER

## 2017-07-12 ENCOUNTER — OFFICE VISIT (OUTPATIENT)
Dept: CARDIOLOGY | Facility: CLINIC | Age: 57
End: 2017-07-12
Payer: COMMERCIAL

## 2017-07-12 VITALS
DIASTOLIC BLOOD PRESSURE: 60 MMHG | SYSTOLIC BLOOD PRESSURE: 174 MMHG | BODY MASS INDEX: 27.2 KG/M2 | WEIGHT: 190 LBS | HEART RATE: 60 BPM | HEIGHT: 70 IN

## 2017-07-12 DIAGNOSIS — E78.5 HYPERLIPIDEMIA LDL GOAL <70: ICD-10-CM

## 2017-07-12 DIAGNOSIS — I73.9 PAD (PERIPHERAL ARTERY DISEASE): Primary | ICD-10-CM

## 2017-07-12 DIAGNOSIS — I10 ESSENTIAL HYPERTENSION: ICD-10-CM

## 2017-07-12 DIAGNOSIS — I70.213 ATHEROSCLEROSIS OF NATIVE ARTERY OF BOTH LOWER EXTREMITIES WITH INTERMITTENT CLAUDICATION: ICD-10-CM

## 2017-07-12 PROCEDURE — 99214 OFFICE O/P EST MOD 30 MIN: CPT | Mod: S$GLB,,, | Performed by: INTERNAL MEDICINE

## 2017-07-12 PROCEDURE — 99999 PR PBB SHADOW E&M-EST. PATIENT-LVL II: CPT | Mod: PBBFAC,,, | Performed by: INTERNAL MEDICINE

## 2017-07-12 RX ORDER — CILOSTAZOL 100 MG/1
100 TABLET ORAL 2 TIMES DAILY
Qty: 180 TABLET | Refills: 3 | Status: SHIPPED | OUTPATIENT
Start: 2017-07-12 | End: 2018-07-24 | Stop reason: SDUPTHER

## 2017-07-12 NOTE — PROGRESS NOTES
Subjective:   Patient ID:  Domingo Gross Sr. is a 57 y.o. male who presents for follow up of Peripheral Arterial Disease; Claudication; Hyperlipidemia; and Hypertension      HPI:     Domingo Gross Sr. 57 y.o. male is here follow up and recent L EIA PTAS for winsome IIb claudication. Left leg claudication is significantly better. He has winsome IIa of bilateral calves, R >> L. No ulcers. He is not taking pletal. He is on aspirin and plavix for DAPT.       He has a h/o L SFA atherectomy with 2.2 Phoenix catheter with PTA using using 6.0 x 100 mm Lutonix DCB in 6/2015.  He had distal aorta iliac reconstruction in 6/2014 with 8.0 x 39 mm BES overlapping with 9.0 x 60 in R EIA and 8.0 x 80 mm SES in L EIA post dilated with 9.0 mm bilaterally.       ELISABETH with stress 5/2017:   R 1.01 to 0.44   L 0.92 to 0.45    After 6 minutes ambulation      CTA 5/2017:       Patent distal aorta and bilateral GRUPO/EIA stents   L EIA with de shawna 90% stenosis   L SFA 80% with 3 vessel run off     R EIA/CFA with moderate disease   R SFA 80% stenosis with 3 vessel run off        Peripheral angiogram with intervention 6/2017         Patent bilateral GRUPO/EIA stents.    De shawna 80% left EIA stenosis treated with 9.0 x 80 mm Lifestar  stent post dilated with 8.0 mm balloon.    Bilateral 70-80% SFA stenosis with 3 vessel run off.            Patient Active Problem List    Diagnosis Date Noted    Left knee pain 01/19/2016    Pain of left lower extremity 01/19/2016    Difficulty walking 01/19/2016    Acute pain of left knee 12/11/2015    Hyperlipidemia LDL goal <70 06/12/2015    DJD (degenerative joint disease), lumbar 05/27/2015    Lumbar facet arthropathy 05/27/2015    DDD (degenerative disc disease) 05/27/2015    Spondylosis without myelopathy 05/27/2015    Limb pain 11/21/2014    History of back injury 11/21/2014     20 years ago a bag fell on his back at work      Myalgia 11/21/2014    Claudication in peripheral vascular  disease 06/13/2014    PAD (peripheral artery disease) 05/21/2014 6/13/2014      1. Three vessel runoff below the knee bilaterally.  2. Severe disease of the terminal aorta.  3. Successful PTAS.  4. Bilateral common iliac reconstruction with 8 x 39 mm stent extending in the aorta  5. Right common illiac was treated with an overlapping 9 x 60 mm SES   6. Left common iliac was treated with an overlapping 8 x 80 mm SES down to external iliac  7. All stents were post dilated with 9.0 x 60 mm balloons  8. Moderate bilateral SFA disease  9. Chronically occluded left internal iliac artery        6/12/2015      1. 80% mid left SFA with a 20 mmHg resting mean gradient  2. Atherectomy with 2.2 SoCATnix Volcano catheter  3. PTA with 6.0 x 100 mm Lutonix drug coated balloon        6/9/2017      Patent bilateral GRUPO/EIA stents  L EIA PTAS 9.0 x 80 mm Life stent post dilated with 8.0 mm balloon  Bilateral 70-80% SFA stenosis with 3 vessel run off              Atherosclerosis of leg with intermittent claudication 04/21/2014     Stevie IIB      Elevated TSH 05/29/2013    HTN (hypertension) 04/29/2013    Elevated fasting blood sugar 04/29/2013           Left Arm BP - Sitting: (P) 160/82          LAST HbA1c  Lab Results   Component Value Date    HGBA1C 5.8 04/30/2013       Lipid panel  Lab Results   Component Value Date    CHOL 166 01/07/2017    CHOL 118 (L) 06/12/2015    CHOL 133 08/26/2014     Lab Results   Component Value Date    HDL 50 01/07/2017    HDL 41 06/12/2015    HDL 44 08/26/2014     Lab Results   Component Value Date    LDLCALC 88.6 01/07/2017    LDLCALC 54.2 (L) 06/12/2015    LDLCALC 67.4 08/26/2014     Lab Results   Component Value Date    TRIG 137 01/07/2017    TRIG 114 06/12/2015    TRIG 108 08/26/2014     Lab Results   Component Value Date    CHOLHDL 30.1 01/07/2017    CHOLHDL 34.7 06/12/2015    CHOLHDL 33.1 08/26/2014            Review of Systems   Constitution: Negative for diaphoresis, weakness, night  sweats, weight gain and weight loss.   HENT: Negative for congestion.    Eyes: Negative for blurred vision, discharge and double vision.   Cardiovascular: Negative for chest pain, claudication, cyanosis, dyspnea on exertion, irregular heartbeat, leg swelling, near-syncope, orthopnea, palpitations, paroxysmal nocturnal dyspnea and syncope.   Respiratory: Negative for cough, shortness of breath and wheezing.    Endocrine: Negative for cold intolerance, heat intolerance and polyphagia.   Hematologic/Lymphatic: Negative for adenopathy and bleeding problem. Does not bruise/bleed easily.   Skin: Negative for dry skin and nail changes.   Musculoskeletal: Negative for arthritis, back pain, falls, joint pain, myalgias and neck pain.   Gastrointestinal: Negative for bloating, abdominal pain, change in bowel habit and constipation.   Genitourinary: Negative for bladder incontinence, dysuria, flank pain, genital sores and missed menses.   Neurological: Negative for aphonia, brief paralysis, difficulty with concentration and dizziness.   Psychiatric/Behavioral: Negative for altered mental status and memory loss. The patient does not have insomnia.    Allergic/Immunologic: Negative for environmental allergies.       Objective:   Physical Exam   Constitutional: He is oriented to person, place, and time. He appears well-developed and well-nourished. He is not intubated.   HENT:   Head: Normocephalic and atraumatic.   Right Ear: External ear normal.   Left Ear: External ear normal.   Mouth/Throat: Oropharynx is clear and moist.   Eyes: Conjunctivae and EOM are normal. Pupils are equal, round, and reactive to light. Right eye exhibits no discharge. Left eye exhibits no discharge. No scleral icterus.   Neck: Normal range of motion. Neck supple. Normal carotid pulses, no hepatojugular reflux and no JVD present. Carotid bruit is not present. No tracheal deviation present. No thyromegaly present.   Cardiovascular: Normal rate, regular  rhythm, S1 normal and S2 normal.   No extrasystoles are present. PMI is not displaced.  Exam reveals no gallop, no S3, no distant heart sounds, no friction rub and no midsystolic click.    No murmur heard.  Pulses:       Carotid pulses are 2+ on the right side, and 2+ on the left side.       Radial pulses are 2+ on the right side, and 2+ on the left side.        Femoral pulses are 2+ on the right side, and 2+ on the left side.       Popliteal pulses are 2+ on the right side, and 2+ on the left side.        Dorsalis pedis pulses are 0 on the right side, and 1+ on the left side.        Posterior tibial pulses are 0 on the right side, and 1+ on the left side.     Biphasic L DP and PT doppler signals      Biphasic (weaker than L) R DP and PT doppler signals         Pulmonary/Chest: Effort normal and breath sounds normal. No accessory muscle usage or stridor. No apnea, no tachypnea and no bradypnea. He is not intubated. No respiratory distress. He has no decreased breath sounds. He has no wheezes. He has no rales. He exhibits no tenderness and no bony tenderness.   Abdominal: He exhibits no distension, no pulsatile liver, no abdominal bruit, no ascites, no pulsatile midline mass and no mass. There is no tenderness. There is no rebound and no guarding.   Musculoskeletal: Normal range of motion. He exhibits no edema or tenderness.   Lymphadenopathy:     He has no cervical adenopathy.   Neurological: He is alert and oriented to person, place, and time. He has normal reflexes. No cranial nerve deficit. Coordination normal.   Skin: Skin is warm. No rash noted. No erythema. No pallor.   Psychiatric: He has a normal mood and affect. His behavior is normal. Judgment and thought content normal.       Assessment:     1. PAD (peripheral artery disease)    2. Atherosclerosis of native artery of both lower extremities with intermittent claudication    3. Essential hypertension    4. Hyperlipidemia LDL goal <70        Plan:          Add pletal to medical therapy  ELISABETH with stress in 6 months        If he has refractory claudication with exercise and medical therapy he will return for SFA intervention       Antegrade access or JOSY   Atherectomy with PTA using DCB          Continue with current medical plan and lifestyle changes.  Return sooner for concerns or questions. If symptoms persist go to the ED  I have reviewed all pertinent data on this patient             I have reviewed the patient's medical history in detail and updated the computerized patient record.    Orders Placed This Encounter   Procedures    Cardiology Lab Ankle Brachial Indices W Stress     Standing Status:   Future     Standing Expiration Date:   7/12/2018       Follow up as scheduled. Return sooner for concerns or questions  Follow up in 6 months  If his symptoms are worse he will to set the procedure            He expressed verbal understanding and agreed with the plan        Patient's Medications   New Prescriptions    CILOSTAZOL (PLETAL) 100 MG TAB    Take 1 tablet (100 mg total) by mouth 2 (two) times daily.   Previous Medications    ASPIRIN (ASPIR-81 ORAL)    Take 1 tablet by mouth.    ATORVASTATIN (LIPITOR) 40 MG TABLET    Take 1 tablet (40 mg total) by mouth once daily.    CLOPIDOGREL (PLAVIX) 75 MG TABLET    Take 1 tablet (75 mg total) by mouth once daily.    COENZYME Q10 (COQ-10) 100 MG CAPSULE    Take 1 capsule (100 mg total) by mouth once daily.    HYDROCHLOROTHIAZIDE (HYDRODIURIL) 25 MG TABLET    Take 1 tablet (25 mg total) by mouth once daily.    LOSARTAN (COZAAR) 100 MG TABLET    Take 1 tablet (100 mg total) by mouth once daily.    METOPROLOL SUCCINATE (TOPROL-XL) 25 MG 24 HR TABLET    Take 1 tablet (25 mg total) by mouth once daily.   Modified Medications    No medications on file   Discontinued Medications    No medications on file

## 2017-07-12 NOTE — PATIENT INSTRUCTIONS
Taking Aspirin for Atherosclerosis       Aspirin is a medicine often prescribed to treat atherosclerosis. This condition affects your arteries. These are the blood vessels that carry blood away from your heart. Having atherosclerosis means youre at greater risk of a heart attack or stroke. Aspirin can help prevent these from happening.  How atherosclerosis affects your arteries   A fatty material (plaque) can build up in your arteries. This makes it harder for blood to flow through them. A blood clot can then form on the plaque. This may block the artery, cutting off blood flow. This can cause conditions such as coronary artery disease (CAD) and peripheral arterial disease (PAD):  · CAD occurs when plaque builds up in the coronary artery. This artery supplies the heart with oxygen-rich blood.  · PAD occurs when plaque forms in leg arteries.  The same things that cause CAD and PAD can also cause plaque to form in other arteries in your body, such as those in the brain. When plaque occurs in any of these arteries, it raises your risk of heart attack or stroke.  What aspirin does  Aspirin is a blood-thinner (antiplatelet medicine). It helps keep blood clots from forming. This reduces the risk of blockage. Aspirin can be taken daily if you are at high risk of or have already had a heart attack or stroke. It is also used after a procedure called a stent placement. This is when a tiny wire mesh tube, or stent, is placed in an artery to keep it open. Aspirin helps prevent blood clots from forming on the stent.  Taking aspirin safely  Tell your healthcare provider about any medicines you are taking. This includes:  · All prescription medicines  · Over-the-counter medicines  · Herbs, vitamins, and other supplements  Also mention if you have a history of ulcers or bleeding problems. Ask whether you will need to stop taking aspirin before having surgery or dental work. Always take medicines as directed.  Tips for taking  aspirin  · Have a routine. For example, take aspirin with the same meal each day.  · Dont take more than prescribed. A low dose gives the same benefit as a higher one, with a lower risk of side effects.  · Dont skip doses. Aspirin needs to be taken each day to work well.  · Keep track of what you take. A pillbox with days of the week can help, especially if you take several medicines. Or use a list or chart to keep track.  When to call your healthcare provider  Side effects of aspirin are not usually serious. If you do have problems, changing your dose may help. Call your healthcare provider if you have any of the following:  · Excessive bruising (some bruising is normal)  · Nosebleeds, bleeding gums, or other excessive bleeding  · An upset stomach or stomach pain   Date Last Reviewed: 6/1/2016  © 5265-9843 [employer]. [address] [place] [address], [place] [address]. All rights reserved. This information is not intended as a substitute for professional medical care. Always follow your healthcare professional's instructions.

## 2017-09-08 DIAGNOSIS — E78.5 HYPERLIPIDEMIA, UNSPECIFIED HYPERLIPIDEMIA TYPE: ICD-10-CM

## 2017-09-08 DIAGNOSIS — I77.9 PERIPHERAL ARTERIAL OCCLUSIVE DISEASE: ICD-10-CM

## 2017-09-11 DIAGNOSIS — I77.9 PERIPHERAL ARTERIAL OCCLUSIVE DISEASE: ICD-10-CM

## 2017-09-11 DIAGNOSIS — I10 ESSENTIAL HYPERTENSION: ICD-10-CM

## 2017-09-11 DIAGNOSIS — E78.5 HYPERLIPIDEMIA, UNSPECIFIED HYPERLIPIDEMIA TYPE: ICD-10-CM

## 2017-09-11 RX ORDER — ATORVASTATIN CALCIUM 40 MG/1
40 TABLET, FILM COATED ORAL DAILY
Qty: 30 TABLET | Refills: 0 | Status: SHIPPED | OUTPATIENT
Start: 2017-09-11 | End: 2018-01-10 | Stop reason: SDUPTHER

## 2017-09-11 RX ORDER — ATORVASTATIN CALCIUM 40 MG/1
40 TABLET, FILM COATED ORAL DAILY
Qty: 90 TABLET | Refills: 3 | OUTPATIENT
Start: 2017-09-11 | End: 2018-09-11

## 2017-09-11 RX ORDER — HYDROCHLOROTHIAZIDE 25 MG/1
25 TABLET ORAL DAILY
Qty: 30 TABLET | Refills: 0 | Status: SHIPPED | OUTPATIENT
Start: 2017-09-11 | End: 2018-01-17 | Stop reason: SDUPTHER

## 2017-09-19 ENCOUNTER — TELEPHONE (OUTPATIENT)
Dept: INTERNAL MEDICINE | Facility: CLINIC | Age: 57
End: 2017-09-19

## 2017-09-29 ENCOUNTER — TELEPHONE (OUTPATIENT)
Dept: INTERNAL MEDICINE | Facility: CLINIC | Age: 57
End: 2017-09-29

## 2017-09-29 NOTE — TELEPHONE ENCOUNTER
Spoke with pt who states that he would like annual labs done. Pt was informed that once the orders are placed he will get a call for scheduling. Pt voiced understanding.

## 2017-09-29 NOTE — TELEPHONE ENCOUNTER
----- Message from Kortney Lam sent at 9/29/2017 10:27 AM CDT -----  Contact: [phone]  Patient is requesting orders for labs

## 2017-10-01 DIAGNOSIS — I73.9 PAD (PERIPHERAL ARTERY DISEASE): Primary | ICD-10-CM

## 2017-10-07 ENCOUNTER — LAB VISIT (OUTPATIENT)
Dept: LAB | Facility: HOSPITAL | Age: 57
End: 2017-10-07
Attending: INTERNAL MEDICINE
Payer: COMMERCIAL

## 2017-10-07 DIAGNOSIS — I73.9 PAD (PERIPHERAL ARTERY DISEASE): ICD-10-CM

## 2017-10-07 LAB
CHOLEST SERPL-MCNC: 183 MG/DL
CHOLEST/HDLC SERPL: 3.6 {RATIO}
HDLC SERPL-MCNC: 51 MG/DL
HDLC SERPL: 27.9 %
LDLC SERPL CALC-MCNC: 107 MG/DL
NONHDLC SERPL-MCNC: 132 MG/DL
TRIGL SERPL-MCNC: 125 MG/DL

## 2017-10-07 PROCEDURE — 36415 COLL VENOUS BLD VENIPUNCTURE: CPT | Mod: PO

## 2017-10-07 PROCEDURE — 80061 LIPID PANEL: CPT

## 2017-12-05 ENCOUNTER — PATIENT MESSAGE (OUTPATIENT)
Dept: CARDIOLOGY | Facility: CLINIC | Age: 57
End: 2017-12-05

## 2017-12-05 ENCOUNTER — OFFICE VISIT (OUTPATIENT)
Dept: INTERNAL MEDICINE | Facility: CLINIC | Age: 57
End: 2017-12-05
Payer: COMMERCIAL

## 2017-12-05 VITALS
SYSTOLIC BLOOD PRESSURE: 128 MMHG | OXYGEN SATURATION: 99 % | HEART RATE: 85 BPM | WEIGHT: 187.19 LBS | BODY MASS INDEX: 26.8 KG/M2 | HEIGHT: 70 IN | DIASTOLIC BLOOD PRESSURE: 72 MMHG

## 2017-12-05 DIAGNOSIS — J98.8 VIRAL RESPIRATORY ILLNESS: Primary | ICD-10-CM

## 2017-12-05 DIAGNOSIS — B97.89 VIRAL RESPIRATORY ILLNESS: Primary | ICD-10-CM

## 2017-12-05 LAB
FLUAV AG SPEC QL IA: NEGATIVE
FLUBV AG SPEC QL IA: NEGATIVE
SPECIMEN SOURCE: NORMAL

## 2017-12-05 PROCEDURE — 99999 PR PBB SHADOW E&M-EST. PATIENT-LVL III: CPT | Mod: PBBFAC,,, | Performed by: INTERNAL MEDICINE

## 2017-12-05 PROCEDURE — 99213 OFFICE O/P EST LOW 20 MIN: CPT | Mod: S$GLB,,, | Performed by: INTERNAL MEDICINE

## 2017-12-05 PROCEDURE — 87400 INFLUENZA A/B EACH AG IA: CPT | Mod: 59,PO

## 2017-12-05 RX ORDER — BENZONATATE 100 MG/1
100 CAPSULE ORAL 3 TIMES DAILY PRN
Qty: 30 CAPSULE | Refills: 0 | Status: SHIPPED | OUTPATIENT
Start: 2017-12-05 | End: 2017-12-15

## 2017-12-05 NOTE — PROGRESS NOTES
Subjective:       Patient ID: Domingo Gross . is a 57 y.o. male.    Chief Complaint: Cough (congestion )    HPI  Review of Systems    Objective:      Physical Exam    Assessment:       No diagnosis found.    Plan:       ***

## 2017-12-05 NOTE — PROGRESS NOTES
"Subjective:       Patient ID: Domingo Gross . is a 57 y.o. male.    Chief Complaint: Cough (congestion )    HPI Mr. Gross is a 57 year old male who presents with chief complaints of cough and sinus congestion. He started experiencing these symptoms yesterday and they are worsening. Nothing makes them better or worse. Cough is productive of green phlegm. He denies any fever, nausea, vomiting or diarrhea. Tried OTC coricidin with no relief. Got flu shot this year. Has sick contact with wife who has same symptoms at home.   Has PMH significant for past tobacco use (smoked for 15-20 years before quitting), blebs and multiple pneumothoraces. Does not currently smoke. Has never been diagnosed with asthma or COPD.    Review of Systems   Constitutional: Negative for fever.   HENT: Positive for rhinorrhea, sinus pressure and sore throat.    Eyes: Positive for redness.   Respiratory: Positive for cough (phlegm, green). Negative for wheezing.    Cardiovascular: Negative for chest pain.   Gastrointestinal: Negative for abdominal pain, diarrhea, nausea and vomiting.   Genitourinary: Negative for dysuria and hematuria.   Skin: Negative for rash.   Allergic/Immunologic: Positive for environmental allergies.   Neurological: Negative for dizziness and headaches.   Hematological: Negative for adenopathy.       Objective:      Physical Exam   Constitutional: He appears well-developed and well-nourished. No distress.   HENT:   Head: Normocephalic and atraumatic.   Right Ear: External ear normal.   Left Ear: External ear normal.   Mild erythema ("cobblestoning") of posterior oropharynx.    Eyes: EOM and lids are normal. Pupils are equal, round, and reactive to light. Right conjunctiva is injected. Left conjunctiva is injected.   Skin: He is not diaphoretic.       Assessment:       1. Viral respiratory illness        Plan:     1. Viral respiratory illness  -Counseled patient that he has viral upper respiratory infection  -Will " swab for flu. If positive, will start tamiflu (day 1 of illness today)  -Flonase for sinus/nasal congestion  -tessalon perles PRN cough  -Counseled pt that he may experience these symptoms for a while (5-10 days) as he is only on day 1 of this viral illness  -Counseled that he may have persistent cough, as long as 6-8 weeks, after viral illness  -Recommended that he drink plenty of fluids, stay well hydrated and rest  -Gave work exercise for today and tomorrow    RTC PRN

## 2017-12-05 NOTE — LETTER
December 5, 2017      [facility]  [address]  [place] [address]  Phone: [phone]  Fax: [phone]       Patient: Domingo Gross   YOB: 1960  Date of Visit: 12/05/2017    To Whom It May Concern:    Dax Gross  was at [facility] on 12/05/2017. Please excuse Mr. Gross from work on 12/5/17 and 12/6/17 due to illness.    Sincerely,    Analy Encarnacion MD

## 2017-12-08 DIAGNOSIS — I10 ESSENTIAL HYPERTENSION: ICD-10-CM

## 2017-12-09 DIAGNOSIS — I10 ESSENTIAL HYPERTENSION: ICD-10-CM

## 2017-12-09 RX ORDER — LOSARTAN POTASSIUM 100 MG/1
TABLET ORAL
Qty: 90 TABLET | Refills: 1 | Status: SHIPPED | OUTPATIENT
Start: 2017-12-09 | End: 2018-04-20 | Stop reason: SDUPTHER

## 2017-12-09 RX ORDER — LOSARTAN POTASSIUM 100 MG/1
100 TABLET ORAL DAILY
Qty: 90 TABLET | Refills: 3 | Status: CANCELLED | OUTPATIENT
Start: 2017-12-09

## 2018-01-10 DIAGNOSIS — I77.9 PERIPHERAL ARTERIAL OCCLUSIVE DISEASE: ICD-10-CM

## 2018-01-10 DIAGNOSIS — E78.5 HYPERLIPIDEMIA, UNSPECIFIED HYPERLIPIDEMIA TYPE: ICD-10-CM

## 2018-01-10 RX ORDER — ATORVASTATIN CALCIUM 40 MG/1
TABLET, FILM COATED ORAL
Qty: 90 TABLET | Refills: 0 | Status: SHIPPED | OUTPATIENT
Start: 2018-01-10 | End: 2018-01-15 | Stop reason: SDUPTHER

## 2018-01-15 NOTE — TELEPHONE ENCOUNTER
----- Message from Luana Galo sent at 1/15/2018 11:31 AM CST -----  Contact: self, [phone]  Patient requests Atorvastatin prescription refill sent to [facility] pharmacy on file. Please advise.

## 2018-01-16 DIAGNOSIS — I77.9 PERIPHERAL ARTERIAL OCCLUSIVE DISEASE: ICD-10-CM

## 2018-01-16 DIAGNOSIS — E78.5 HYPERLIPIDEMIA, UNSPECIFIED HYPERLIPIDEMIA TYPE: ICD-10-CM

## 2018-01-16 RX ORDER — ATORVASTATIN CALCIUM 40 MG/1
TABLET, FILM COATED ORAL
Qty: 90 TABLET | Refills: 1 | Status: SHIPPED | OUTPATIENT
Start: 2018-01-16 | End: 2018-01-17 | Stop reason: SDUPTHER

## 2018-01-17 DIAGNOSIS — I10 ESSENTIAL HYPERTENSION: ICD-10-CM

## 2018-01-17 RX ORDER — ATORVASTATIN CALCIUM 40 MG/1
40 TABLET, FILM COATED ORAL DAILY
Qty: 90 TABLET | Refills: 0 | Status: SHIPPED | OUTPATIENT
Start: 2018-01-17 | End: 2018-04-20 | Stop reason: SDUPTHER

## 2018-01-19 RX ORDER — HYDROCHLOROTHIAZIDE 25 MG/1
TABLET ORAL
Qty: 30 TABLET | Refills: 0 | Status: SHIPPED | OUTPATIENT
Start: 2018-01-19 | End: 2018-02-19 | Stop reason: SDUPTHER

## 2018-01-26 DIAGNOSIS — I10 ESSENTIAL HYPERTENSION: ICD-10-CM

## 2018-01-29 ENCOUNTER — HOSPITAL ENCOUNTER (OUTPATIENT)
Dept: CARDIOLOGY | Facility: HOSPITAL | Age: 58
Discharge: HOME | End: 2018-01-29
Attending: INTERNAL MEDICINE
Payer: COMMERCIAL

## 2018-01-29 ENCOUNTER — PATIENT MESSAGE (OUTPATIENT)
Dept: INTERNAL MEDICINE | Facility: CLINIC | Age: 58
End: 2018-01-29

## 2018-01-29 ENCOUNTER — OFFICE VISIT (OUTPATIENT)
Dept: CARDIOLOGY | Facility: CLINIC | Age: 58
End: 2018-01-29
Payer: COMMERCIAL

## 2018-01-29 VITALS
HEART RATE: 70 BPM | BODY MASS INDEX: 27.06 KG/M2 | WEIGHT: 189 LBS | SYSTOLIC BLOOD PRESSURE: 129 MMHG | DIASTOLIC BLOOD PRESSURE: 76 MMHG | HEIGHT: 70 IN | OXYGEN SATURATION: 99 %

## 2018-01-29 DIAGNOSIS — I73.9 PAD (PERIPHERAL ARTERY DISEASE): ICD-10-CM

## 2018-01-29 DIAGNOSIS — I70.213 ATHEROSCLEROSIS OF NATIVE ARTERY OF BOTH LOWER EXTREMITIES WITH INTERMITTENT CLAUDICATION: Primary | ICD-10-CM

## 2018-01-29 DIAGNOSIS — I10 ESSENTIAL HYPERTENSION: ICD-10-CM

## 2018-01-29 DIAGNOSIS — E78.5 HYPERLIPIDEMIA LDL GOAL <70: ICD-10-CM

## 2018-01-29 PROCEDURE — 93924 LWR XTR VASC STDY BILAT: CPT | Mod: 26,,, | Performed by: INTERNAL MEDICINE

## 2018-01-29 PROCEDURE — 99999 PR PBB SHADOW E&M-EST. PATIENT-LVL III: CPT | Mod: PBBFAC,,, | Performed by: INTERNAL MEDICINE

## 2018-01-29 PROCEDURE — 93924 LWR XTR VASC STDY BILAT: CPT

## 2018-01-29 PROCEDURE — 99215 OFFICE O/P EST HI 40 MIN: CPT | Mod: S$GLB,,, | Performed by: INTERNAL MEDICINE

## 2018-01-29 RX ORDER — DIPHENHYDRAMINE HCL 25 MG
50 CAPSULE ORAL ONCE
Status: CANCELLED | OUTPATIENT
Start: 2018-01-29 | End: 2018-01-29

## 2018-01-29 RX ORDER — SODIUM CHLORIDE 9 MG/ML
INJECTION, SOLUTION INTRAVENOUS CONTINUOUS
Status: CANCELLED | OUTPATIENT
Start: 2018-01-29

## 2018-01-29 RX ORDER — METOPROLOL SUCCINATE 25 MG/1
TABLET, EXTENDED RELEASE ORAL
Qty: 90 TABLET | Refills: 0 | Status: SHIPPED | OUTPATIENT
Start: 2018-01-29 | End: 2018-04-17 | Stop reason: SDUPTHER

## 2018-01-29 NOTE — PROGRESS NOTES
Subjective:   Patient ID:  Domingo Gross Sr. is a 57 y.o. male who presents for follow up of Peripheral Arterial Disease; Claudication; Hyperlipidemia; and Hypertension      HPI:     Domingo Gross Sr. 57 y.o. male is here follow up PAD with winsome IIb claudication. In 6/2017 he had L EIA PTAS for winsome IIb claudication. Despite the intervention and pletal with an exercise he continues to have claudication, L >>R. Winsome IIb. No ulcers. He is on aspirin and plavix for DAPT.       He has a h/o L SFA atherectomy with 2.2 Phoenix catheter with PTA using using 6.0 x 100 mm Lutonix DCB in 6/2015.  He had distal aorta iliac reconstruction in 6/2014 with 8.0 x 39 mm BES overlapping with 9.0 x 60 in R EIA and 8.0 x 80 mm SES in L EIA post dilated with 9.0 mm bilaterally.       ELISABETH with stress 5/2017:   R 1.01 to 0.44   L 0.92 to 0.45    After 6 minutes ambulation      CTA 5/2017:       Patent distal aorta and bilateral GRUPO/EIA stents   L EIA with de shawna 90% stenosis   L SFA 80% with 3 vessel run off     R EIA/CFA with moderate disease   R SFA 80% stenosis with 3 vessel run off        Peripheral angiogram with intervention 6/2017         Patent bilateral GRUOP/EIA stents.    De shawna 80% left EIA stenosis treated with 9.0 x 80 mm Lifestar  stent post dilated with 8.0 mm balloon.    Bilateral 70-80% SFA stenosis with 3 vessel run off.            Patient Active Problem List    Diagnosis Date Noted    Left knee pain 01/19/2016    Pain of left lower extremity 01/19/2016    Difficulty walking 01/19/2016    Acute pain of left knee 12/11/2015    Hyperlipidemia LDL goal <70 06/12/2015    DJD (degenerative joint disease), lumbar 05/27/2015    Lumbar facet arthropathy 05/27/2015    DDD (degenerative disc disease) 05/27/2015    Spondylosis without myelopathy 05/27/2015    Limb pain 11/21/2014    History of back injury 11/21/2014     20 years ago a bag fell on his back at work      Myalgia 11/21/2014     Claudication in peripheral vascular disease 2014    PAD (peripheral artery disease) 2014      1. Three vessel runoff below the knee bilaterally.  2. Severe disease of the terminal aorta.  3. Successful PTAS.  4. Bilateral common iliac reconstruction with 8 x 39 mm stent extending in the aorta  5. Right common illiac was treated with an overlapping 9 x 60 mm SES   6. Left common iliac was treated with an overlapping 8 x 80 mm SES down to external iliac  7. All stents were post dilated with 9.0 x 60 mm balloons  8. Moderate bilateral SFA disease  9. Chronically occluded left internal iliac artery        2015      1. 80% mid left SFA with a 20 mmHg resting mean gradient  2. Atherectomy with 2.2 [employer] Volcano catheter  3. PTA with 6.0 x 100 mm Lutonix drug coated balloon        2017      Patent bilateral GRUPO/EIA stents  L EIA PTAS 9.0 x 80 mm Life stent post dilated with 8.0 mm balloon  Bilateral 70-80% SFA stenosis with 3 vessel run off              Atherosclerosis of leg with intermittent claudication 2014     Stevie IIB      Elevated TSH 2013    HTN (hypertension) 2013    Elevated fasting blood sugar 2013           Right Arm BP - Sittin/76  Left Arm BP - Sittin/72          LAST HbA1c  Lab Results   Component Value Date    HGBA1C 5.8 2013       Lipid panel  Lab Results   Component Value Date    CHOL 183 10/07/2017    CHOL 166 2017    CHOL 118 (L) 2015     Lab Results   Component Value Date    HDL 51 10/07/2017    HDL 50 2017    HDL 41 2015     Lab Results   Component Value Date    LDLCALC 107.0 10/07/2017    LDLCALC 88.6 2017    LDLCALC 54.2 (L) 2015     Lab Results   Component Value Date    TRIG 125 10/07/2017    TRIG 137 2017    TRIG 114 2015     Lab Results   Component Value Date    CHOLHDL 27.9 10/07/2017    CHOLHDL 30.1 2017    CHOLHDL 34.7 2015            Review of  Systems   Constitution: Negative for diaphoresis, weakness, night sweats, weight gain and weight loss.   HENT: Negative for congestion.    Eyes: Negative for blurred vision, discharge and double vision.   Cardiovascular: Negative for chest pain, claudication, cyanosis, dyspnea on exertion, irregular heartbeat, leg swelling, near-syncope, orthopnea, palpitations, paroxysmal nocturnal dyspnea and syncope.   Respiratory: Negative for cough, shortness of breath and wheezing.    Endocrine: Negative for cold intolerance, heat intolerance and polyphagia.   Hematologic/Lymphatic: Negative for adenopathy and bleeding problem. Does not bruise/bleed easily.   Skin: Negative for dry skin and nail changes.   Musculoskeletal: Negative for arthritis, back pain, falls, joint pain, myalgias and neck pain.   Gastrointestinal: Negative for bloating, abdominal pain, change in bowel habit and constipation.   Genitourinary: Negative for bladder incontinence, dysuria, flank pain, genital sores and missed menses.   Neurological: Negative for aphonia, brief paralysis, difficulty with concentration and dizziness.   Psychiatric/Behavioral: Negative for altered mental status and memory loss. The patient does not have insomnia.    Allergic/Immunologic: Negative for environmental allergies.       Objective:   Physical Exam   Constitutional: He is oriented to person, place, and time. He appears well-developed and well-nourished. He is not intubated.   HENT:   Head: Normocephalic and atraumatic.   Right Ear: External ear normal.   Left Ear: External ear normal.   Mouth/Throat: Oropharynx is clear and moist.   Eyes: Conjunctivae and EOM are normal. Pupils are equal, round, and reactive to light. Right eye exhibits no discharge. Left eye exhibits no discharge. No scleral icterus.   Neck: Normal range of motion. Neck supple. Normal carotid pulses, no hepatojugular reflux and no JVD present. Carotid bruit is not present. No tracheal deviation present.  No thyromegaly present.   Cardiovascular: Normal rate, regular rhythm, S1 normal and S2 normal.   No extrasystoles are present. PMI is not displaced.  Exam reveals no gallop, no S3, no distant heart sounds, no friction rub and no midsystolic click.    No murmur heard.  Pulses:       Carotid pulses are 2+ on the right side, and 2+ on the left side.       Radial pulses are 2+ on the right side, and 2+ on the left side.        Femoral pulses are 2+ on the right side, and 2+ on the left side.       Popliteal pulses are 2+ on the right side, and 2+ on the left side.        Dorsalis pedis pulses are 0 on the right side, and 0 on the left side.        Posterior tibial pulses are 0 on the right side, and 0 on the left side.     Biphasic R DP and PT doppler signals      Monphasic L DP and PT doppler signals                     Pulmonary/Chest: Effort normal and breath sounds normal. No accessory muscle usage or stridor. No apnea, no tachypnea and no bradypnea. He is not intubated. No respiratory distress. He has no decreased breath sounds. He has no wheezes. He has no rales. He exhibits no tenderness and no bony tenderness.   Abdominal: He exhibits no distension, no pulsatile liver, no abdominal bruit, no ascites, no pulsatile midline mass and no mass. There is no tenderness. There is no rebound and no guarding.   Musculoskeletal: Normal range of motion. He exhibits no edema or tenderness.   Lymphadenopathy:     He has no cervical adenopathy.   Neurological: He is alert and oriented to person, place, and time. He has normal reflexes. No cranial nerve deficit. Coordination normal.   Skin: Skin is warm. No rash noted. No erythema. No pallor.   Psychiatric: He has a normal mood and affect. His behavior is normal. Judgment and thought content normal.       Assessment:     1. Atherosclerosis of native artery of both lower extremities with intermittent claudication    2. PAD (peripheral artery disease)    3. Essential hypertension     4. Hyperlipidemia LDL goal <70        Plan:             Peripheral intervention for lifestyle limiting claudication      L CFA 6 fr sheath   JOSY if there any issues with his CFA access      Atherectomy with PTA using DCB   6 mm balloon      Will call patient with date and time  He opted for a Friday after [place]ardi [place]      Risks, benefits and alternatives of peripheral catheterization and possible intervention were discussed with the patient. All questions were answered and informed consent obtained.     I discussed the importance of compliance with dual antiplatelet therapy with the patient to prevent acute or late stent thrombosis with premature discontinuation of the therapy.      Return for R SFA intervention later          Continue with current medical plan and lifestyle changes.  Return sooner for concerns or questions. If symptoms persist go to the ED  I have reviewed all pertinent data on this patient       I have reviewed the patient's medical history in detail and updated the computerized patient record.    Orders Placed This Encounter   Procedures    Case Request-Cath Lab: Pta-Peripheral     Standing Status:   Standing     Number of Occurrences:   1     Order Specific Question:   CPT Code:     Answer:   [facility] REVASC STNT & ATHERECTOMY [56657]       Follow up as scheduled. Return sooner for concerns or questions            He expressed verbal understanding and agreed with the plan        Patient's Medications   New Prescriptions    No medications on file   Previous Medications    ASPIRIN (ASPIR-81 ORAL)    Take 1 tablet by mouth.    ATORVASTATIN (LIPITOR) 40 MG TABLET    Take 1 tablet (40 mg total) by mouth once daily.    CILOSTAZOL (PLETAL) 100 MG TAB    Take 1 tablet (100 mg total) by mouth 2 (two) times daily.    CLOPIDOGREL (PLAVIX) 75 MG TABLET    Take 1 tablet (75 mg total) by mouth once daily.    COENZYME Q10 (COQ-10) 100 MG CAPSULE    Take 1 capsule (100 mg total) by mouth once daily.     HYDROCHLOROTHIAZIDE (HYDRODIURIL) 25 MG TABLET    TAKE 1 TABLET(25 MG) BY MOUTH EVERY DAY    LOSARTAN (COZAAR) 100 MG TABLET    TAKE 1 TABLET(100 MG) BY MOUTH EVERY DAY    METOPROLOL SUCCINATE (TOPROL-XL) 25 MG 24 HR TABLET    TAKE 1 TABLET(25 MG) BY MOUTH EVERY DAY   Modified Medications    No medications on file   Discontinued Medications    No medications on file

## 2018-01-30 LAB — VASCULAR ANKLE BRACHIAL INDEX (ABI) RIGHT: 1.04 (ref 0.9–1.2)

## 2018-02-04 ENCOUNTER — PATIENT MESSAGE (OUTPATIENT)
Dept: CARDIOLOGY | Facility: HOSPITAL | Age: 58
End: 2018-02-04

## 2018-02-19 DIAGNOSIS — I10 ESSENTIAL HYPERTENSION: ICD-10-CM

## 2018-02-21 ENCOUNTER — TELEPHONE (OUTPATIENT)
Dept: INTERNAL MEDICINE | Facility: CLINIC | Age: 58
End: 2018-02-21

## 2018-02-21 RX ORDER — HYDROCHLOROTHIAZIDE 25 MG/1
TABLET ORAL
Qty: 90 TABLET | Refills: 0 | Status: SHIPPED | OUTPATIENT
Start: 2018-02-21 | End: 2018-04-20 | Stop reason: SDUPTHER

## 2018-02-21 NOTE — TELEPHONE ENCOUNTER
----- Message from Analy Encarnacion MD sent at 2/21/2018  9:33 AM CST -----  Please let Mr. rGoss know that I have sent a 90 day supply of his blood pressure medicine to his pharmacy. I will however need to see him in an establish care office visit before I will be able to prescribe more in the future.    Thank you  Dr. Encarnacion

## 2018-02-21 NOTE — TELEPHONE ENCOUNTER
Called pt, no answer,left message adv that Dr. Encarnacion sent in a 90 day supply of bp meds to pt pharmacy, and that pt needs to schedule an est care visit before she can prescribe anything else. Pt was adv to call [phone] to schedule an appt!

## 2018-02-23 NOTE — NURSING
Called and spoke with patient.  Arrival time 6am on Friday March 2nd. .  Pt verbalizes full understanding of all pre op instructions and denies questions.

## 2018-03-02 ENCOUNTER — HOSPITAL ENCOUNTER (OUTPATIENT)
Facility: HOSPITAL | Age: 58
Discharge: HOME | End: 2018-03-02
Attending: INTERNAL MEDICINE | Admitting: INTERNAL MEDICINE
Payer: COMMERCIAL

## 2018-03-02 VITALS
TEMPERATURE: 98 F | HEART RATE: 65 BPM | WEIGHT: 195 LBS | SYSTOLIC BLOOD PRESSURE: 157 MMHG | OXYGEN SATURATION: 100 % | HEIGHT: 71 IN | BODY MASS INDEX: 27.3 KG/M2 | DIASTOLIC BLOOD PRESSURE: 82 MMHG | RESPIRATION RATE: 12 BRPM

## 2018-03-02 DIAGNOSIS — I70.213 ATHEROSCLEROSIS OF NATIVE ARTERY OF BOTH LOWER EXTREMITIES WITH INTERMITTENT CLAUDICATION: ICD-10-CM

## 2018-03-02 DIAGNOSIS — I73.9 PERIPHERAL VASCULAR DISEASE: ICD-10-CM

## 2018-03-02 DIAGNOSIS — I70.211 ATHEROSCLEROSIS OF NATIVE ARTERY OF RIGHT LOWER EXTREMITY WITH INTERMITTENT CLAUDICATION: Primary | ICD-10-CM

## 2018-03-02 LAB
ANION GAP SERPL CALC-SCNC: 8 MMOL/L
BUN SERPL-MCNC: 15 MG/DL
CALCIUM SERPL-MCNC: 8.6 MG/DL
CHLORIDE SERPL-SCNC: 99 MMOL/L
CO2 SERPL-SCNC: 30 MMOL/L
CREAT SERPL-MCNC: 1.1 MG/DL
ERYTHROCYTE [DISTWIDTH] IN BLOOD BY AUTOMATED COUNT: 12.5 %
EST. GFR  (AFRICAN AMERICAN): >60 ML/MIN/1.73 M^2
EST. GFR  (NON AFRICAN AMERICAN): >60 ML/MIN/1.73 M^2
GLUCOSE SERPL-MCNC: 107 MG/DL
HCT VFR BLD AUTO: 33 %
HGB BLD-MCNC: 11.6 G/DL
INR PPP: 1
MCH RBC QN AUTO: 28.2 PG
MCHC RBC AUTO-ENTMCNC: 35.2 G/DL
MCV RBC AUTO: 80 FL
PERIPHERAL PTA: YES
PERIPHERAL STENOSIS: ABNORMAL
PLATELET # BLD AUTO: 243 K/UL
PMV BLD AUTO: 9.3 FL
POTASSIUM SERPL-SCNC: 2.9 MMOL/L
PROTHROMBIN TIME: 10.3 SEC
RBC # BLD AUTO: 4.12 M/UL
SODIUM SERPL-SCNC: 137 MMOL/L
WBC # BLD AUTO: 5.73 K/UL

## 2018-03-02 PROCEDURE — 93010 ELECTROCARDIOGRAM REPORT: CPT | Mod: ,,, | Performed by: INTERNAL MEDICINE

## 2018-03-02 PROCEDURE — 36415 COLL VENOUS BLD VENIPUNCTURE: CPT

## 2018-03-02 PROCEDURE — 63600175 PHARM REV CODE 636 W HCPCS

## 2018-03-02 PROCEDURE — 85610 PROTHROMBIN TIME: CPT

## 2018-03-02 PROCEDURE — 99152 MOD SED SAME PHYS/QHP 5/>YRS: CPT | Mod: ,,, | Performed by: INTERNAL MEDICINE

## 2018-03-02 PROCEDURE — 80048 BASIC METABOLIC PNL TOTAL CA: CPT

## 2018-03-02 PROCEDURE — 25000003 PHARM REV CODE 250: Performed by: INTERNAL MEDICINE

## 2018-03-02 PROCEDURE — 93005 ELECTROCARDIOGRAM TRACING: CPT

## 2018-03-02 PROCEDURE — 85027 COMPLETE CBC AUTOMATED: CPT

## 2018-03-02 PROCEDURE — C1769 GUIDE WIRE: HCPCS

## 2018-03-02 PROCEDURE — 25000003 PHARM REV CODE 250

## 2018-03-02 PROCEDURE — 37224 CATH LAB PROCEDURE: CPT | Mod: LT,,, | Performed by: INTERNAL MEDICINE

## 2018-03-02 PROCEDURE — C2623 CATH, TRANSLUMIN, DRUG-COAT: HCPCS

## 2018-03-02 PROCEDURE — 25500020 PHARM REV CODE 255

## 2018-03-02 RX ORDER — DIPHENHYDRAMINE HCL 25 MG
50 CAPSULE ORAL ONCE
Status: DISCONTINUED | OUTPATIENT
Start: 2018-03-02 | End: 2018-03-02 | Stop reason: HOSPADM

## 2018-03-02 RX ORDER — DIPHENHYDRAMINE HCL 25 MG
50 CAPSULE ORAL ONCE
Status: CANCELLED | OUTPATIENT
Start: 2018-03-03 | End: 2018-03-03

## 2018-03-02 RX ORDER — SODIUM CHLORIDE 9 MG/ML
INJECTION, SOLUTION INTRAVENOUS CONTINUOUS
Status: DISCONTINUED | OUTPATIENT
Start: 2018-03-02 | End: 2018-03-02 | Stop reason: HOSPADM

## 2018-03-02 RX ORDER — SODIUM CHLORIDE 9 MG/ML
INJECTION, SOLUTION INTRAVENOUS CONTINUOUS
Status: CANCELLED | OUTPATIENT
Start: 2018-03-03

## 2018-03-02 RX ORDER — DIAZEPAM 10 MG/2ML
5 INJECTION INTRAMUSCULAR ONCE
Status: CANCELLED | OUTPATIENT
Start: 2018-03-03 | End: 2018-03-03

## 2018-03-02 RX ADMIN — SODIUM CHLORIDE: 0.9 INJECTION, SOLUTION INTRAVENOUS at 07:03

## 2018-03-02 RX ADMIN — SODIUM CHLORIDE: 0.9 INJECTION, SOLUTION INTRAVENOUS at 09:03

## 2018-03-02 NOTE — NURSING
Patient discharged to home as ordered after recovery per. .  All discharge instructions, printed materials, and  prescription given.  Patient instructed on follow-up appointment as ordered.  Patient verbalized understanding and agreement with all discharge instructions given.   Pt is AAOx3, VSS, denies any pain. Right radial gauze and tegaderm dressing c.d.i. No drainage or shadowing noted. No bleeding or hematoma noted around site.   Pt in w/c.  Right AC 20 gauge iv dc'd as ordered with tip intact. aydin and koban dressing applied c.d.i.  Pt discharged home via wheelchair to private vehicle.

## 2018-03-02 NOTE — BRIEF OP NOTE
L SFA intervention for claudication   75% L SFA with 3 vessel run off   7 mmHg resting and 33 mmHg hyperemic mean gradient         L CFA antegrade access   PTA with 6.0 x 150 mm   PTA with 6.0 x 150 mm Lutonix   3 vessel run off

## 2018-03-02 NOTE — NURSING
Patient arrived to recovery cath lab.  Pt drowsy but able to follow commands.  VSS. Left groin site sheath in place with gauze and tegaderm dressing CDI, site soft, no hematoma or bleeding noted. Fall risk precautions given and patient acknowledges.  Will continue to monitor. Updated pt's wife.

## 2018-03-02 NOTE — H&P
Subjective:   Patient ID:  Domingo Gross Sr. is a 57 y.o. male who presents for follow up of Peripheral Arterial Disease; Claudication; Hyperlipidemia; and Hypertension        HPI:      Domingo Gross Sr. 57 y.o. male is here follow up PAD with winsome IIb claudication. In 6/2017 he had L EIA PTAS for winsome IIb claudication. Despite the intervention and pletal with an exercise he continues to have claudication, L >>R. Winsome IIb. No ulcers. He is on aspirin and plavix for DAPT.         He has a h/o L SFA atherectomy with 2.2 Phoenix catheter with PTA using using 6.0 x 100 mm Lutonix DCB in 6/2015.  He had distal aorta iliac reconstruction in 6/2014 with 8.0 x 39 mm BES overlapping with 9.0 x 60 in R EIA and 8.0 x 80 mm SES in L EIA post dilated with 9.0 mm bilaterally.         ELISABETH with stress 5/2017:              R 1.01 to 0.44              L 0.92 to 0.45               After 6 minutes ambulation        CTA 5/2017:                    Patent distal aorta and bilateral GRUPO/EIA stents              L EIA with de shawna 90% stenosis              L SFA 80% with 3 vessel run off                 R EIA/CFA with moderate disease              R SFA 80% stenosis with 3 vessel run off           Peripheral angiogram with intervention 6/2017         Patent bilateral GRUPO/EIA stents.    De shawna 80% left EIA stenosis treated with 9.0 x 80 mm Lifestar  stent post dilated with 8.0 mm balloon.    Bilateral 70-80% SFA stenosis with 3 vessel run off.                       Patient Active Problem List     Diagnosis Date Noted    Left knee pain 01/19/2016    Pain of left lower extremity 01/19/2016    Difficulty walking 01/19/2016    Acute pain of left knee 12/11/2015    Hyperlipidemia LDL goal <70 06/12/2015    DJD (degenerative joint disease), lumbar 05/27/2015    Lumbar facet arthropathy 05/27/2015    DDD (degenerative disc disease) 05/27/2015    Spondylosis without myelopathy 05/27/2015    Limb pain 11/21/2014     History of back injury 2014       20 years ago a bag fell on his back at work       Myalgia 2014    Claudication in peripheral vascular disease 2014    PAD (peripheral artery disease) 2014      1. Three vessel runoff below the knee bilaterally.  2. Severe disease of the terminal aorta.  3. Successful PTAS.  4. Bilateral common iliac reconstruction with 8 x 39 mm stent extending in the aorta  5. Right common illiac was treated with an overlapping 9 x 60 mm SES   6. Left common iliac was treated with an overlapping 8 x 80 mm SES down to external iliac  7. All stents were post dilated with 9.0 x 60 mm balloons  8. Moderate bilateral SFA disease  9. Chronically occluded left internal iliac artery           2015        1. 80% mid left SFA with a 20 mmHg resting mean gradient  2. Atherectomy with 2.2 Clarabridgex Volcano catheter  3. PTA with 6.0 x 100 mm Lutonix drug coated balloon           2017        Patent bilateral GRUPO/EIA stents  L EIA PTAS 9.0 x 80 mm Life stent post dilated with 8.0 mm balloon  Bilateral 70-80% SFA stenosis with 3 vessel run off                Atherosclerosis of leg with intermittent claudication 2014       Stevie IIB       Elevated TSH 2013    HTN (hypertension) 2013    Elevated fasting blood sugar 2013               Right Arm BP - Sittin/76  Left Arm BP - Sittin/72              LAST HbA1c        Lab Results   Component Value Date     HGBA1C 5.8 2013         Lipid panel        Lab Results   Component Value Date     CHOL 183 10/07/2017     CHOL 166 2017     CHOL 118 (L) 2015            Lab Results   Component Value Date     HDL 51 10/07/2017     HDL 50 2017     HDL 41 2015            Lab Results   Component Value Date     LDLCALC 107.0 10/07/2017     LDLCALC 88.6 2017     LDLCALC 54.2 (L) 2015            Lab Results   Component Value Date     TRIG 125 10/07/2017      TRIG 137 01/07/2017     TRIG 114 06/12/2015            Lab Results   Component Value Date     CHOLHDL 27.9 10/07/2017     CHOLHDL 30.1 01/07/2017     CHOLHDL 34.7 06/12/2015               Review of Systems   Constitution: Negative for diaphoresis, weakness, night sweats, weight gain and weight loss.   HENT: Negative for congestion.    Eyes: Negative for blurred vision, discharge and double vision.   Cardiovascular: Negative for chest pain, claudication, cyanosis, dyspnea on exertion, irregular heartbeat, leg swelling, near-syncope, orthopnea, palpitations, paroxysmal nocturnal dyspnea and syncope.   Respiratory: Negative for cough, shortness of breath and wheezing.    Endocrine: Negative for cold intolerance, heat intolerance and polyphagia.   Hematologic/Lymphatic: Negative for adenopathy and bleeding problem. Does not bruise/bleed easily.   Skin: Negative for dry skin and nail changes.   Musculoskeletal: Negative for arthritis, back pain, falls, joint pain, myalgias and neck pain.   Gastrointestinal: Negative for bloating, abdominal pain, change in bowel habit and constipation.   Genitourinary: Negative for bladder incontinence, dysuria, flank pain, genital sores and missed menses.   Neurological: Negative for aphonia, brief paralysis, difficulty with concentration and dizziness.   Psychiatric/Behavioral: Negative for altered mental status and memory loss. The patient does not have insomnia.    Allergic/Immunologic: Negative for environmental allergies.         Objective:   Physical Exam   Constitutional: He is oriented to person, place, and time. He appears well-developed and well-nourished. He is not intubated.   HENT:   Head: Normocephalic and atraumatic.   Right Ear: External ear normal.   Left Ear: External ear normal.   Mouth/Throat: Oropharynx is clear and moist.   Eyes: Conjunctivae and EOM are normal. Pupils are equal, round, and reactive to light. Right eye exhibits no discharge. Left eye exhibits no  discharge. No scleral icterus.   Neck: Normal range of motion. Neck supple. Normal carotid pulses, no hepatojugular reflux and no JVD present. Carotid bruit is not present. No tracheal deviation present. No thyromegaly present.   Cardiovascular: Normal rate, regular rhythm, S1 normal and S2 normal.   No extrasystoles are present. PMI is not displaced.  Exam reveals no gallop, no S3, no distant heart sounds, no friction rub and no midsystolic click.    No murmur heard.  Pulses:       Carotid pulses are 2+ on the right side, and 2+ on the left side.       Radial pulses are 2+ on the right side, and 2+ on the left side.        Femoral pulses are 2+ on the right side, and 2+ on the left side.       Popliteal pulses are 2+ on the right side, and 2+ on the left side.        Dorsalis pedis pulses are 0 on the right side, and 0 on the left side.        Posterior tibial pulses are 0 on the right side, and 0 on the left side.     Biphasic R DP and PT doppler signals      Monphasic L DP and PT doppler signals                     Pulmonary/Chest: Effort normal and breath sounds normal. No accessory muscle usage or stridor. No apnea, no tachypnea and no bradypnea. He is not intubated. No respiratory distress. He has no decreased breath sounds. He has no wheezes. He has no rales. He exhibits no tenderness and no bony tenderness.   Abdominal: He exhibits no distension, no pulsatile liver, no abdominal bruit, no ascites, no pulsatile midline mass and no mass. There is no tenderness. There is no rebound and no guarding.   Musculoskeletal: Normal range of motion. He exhibits no edema or tenderness.   Lymphadenopathy:     He has no cervical adenopathy.   Neurological: He is alert and oriented to person, place, and time. He has normal reflexes. No cranial nerve deficit. Coordination normal.   Skin: Skin is warm. No rash noted. No erythema. No pallor.   Psychiatric: He has a normal mood and affect. His behavior is normal. Judgment and  thought content normal.         Assessment:      1. Atherosclerosis of native artery of both lower extremities with intermittent claudication    2. PAD (peripheral artery disease)    3. Essential hypertension    4. Hyperlipidemia LDL goal <70          Plan:                  Peripheral intervention for lifestyle limiting claudication        L CFA 6 fr sheath   JOSY if there any issues with his CFA access        Atherectomy with PTA using DCB              6 mm balloon        Will call patient with date and time  He opted for a Friday after Mardi [place]as        Risks, benefits and alternatives of peripheral catheterization and possible intervention were discussed with the patient. All questions were answered and informed consent obtained.      I discussed the importance of compliance with dual antiplatelet therapy with the patient to prevent acute or late stent thrombosis with premature discontinuation of the therapy.        Return for R SFA intervention later              Continue with current medical plan and lifestyle changes.  Return sooner for concerns or questions. If symptoms persist go to the ED  I have reviewed all pertinent data on this patient         I have reviewed the patient's medical history in detail and updated the computerized patient record.           Orders Placed This Encounter   Procedures    Case Request-Cath Lab: Pta-Peripheral       Standing Status:   Standing       Number of Occurrences:   1       Order Specific Question:   CPT Code:       Answer:   IA FEM/POPL REVASC STNT & ATHERECTOMY [02378]         Follow up as scheduled. Return sooner for concerns or questions                 He expressed verbal understanding and agreed with the plan                Patient's Medications   New Prescriptions     No medications on file   Previous Medications     ASPIRIN (ASPIR-81 ORAL)    Take 1 tablet by mouth.     ATORVASTATIN (LIPITOR) 40 MG TABLET    Take 1 tablet (40 mg total) by mouth once daily.      CILOSTAZOL (PLETAL) 100 MG TAB    Take 1 tablet (100 mg total) by mouth 2 (two) times daily.     CLOPIDOGREL (PLAVIX) 75 MG TABLET    Take 1 tablet (75 mg total) by mouth once daily.     COENZYME Q10 (COQ-10) 100 MG CAPSULE    Take 1 capsule (100 mg total) by mouth once daily.     HYDROCHLOROTHIAZIDE (HYDRODIURIL) 25 MG TABLET    TAKE 1 TABLET(25 MG) BY MOUTH EVERY DAY     LOSARTAN (COZAAR) 100 MG TABLET    TAKE 1 TABLET(100 MG) BY MOUTH EVERY DAY     METOPROLOL SUCCINATE (TOPROL-XL) 25 MG 24 HR TABLET    TAKE 1 TABLET(25 MG) BY MOUTH EVERY DAY   Modified Medications     No medications on file   Discontinued Medications     No medications on file

## 2018-03-02 NOTE — NURSING
Pt is aaox3. VSS. No c/o pain. Left groin site c.d.i. No bleeding or hematoma noted. Palpabale and doppler pulses checked. Pt is resting in bed supine with sr up x2 eating lunch, fed by his wife at bedside.

## 2018-03-05 ENCOUNTER — PATIENT MESSAGE (OUTPATIENT)
Dept: ADMINISTRATIVE | Facility: HOSPITAL | Age: 58
End: 2018-03-05

## 2018-03-06 ENCOUNTER — TELEPHONE (OUTPATIENT)
Dept: ADMINISTRATIVE | Facility: HOSPITAL | Age: 58
End: 2018-03-06

## 2018-03-06 DIAGNOSIS — Z12.11 ENCOUNTER FOR COLORECTAL CANCER SCREENING: Primary | ICD-10-CM

## 2018-03-06 DIAGNOSIS — Z12.12 ENCOUNTER FOR COLORECTAL CANCER SCREENING: Primary | ICD-10-CM

## 2018-03-12 ENCOUNTER — PATIENT MESSAGE (OUTPATIENT)
Dept: CARDIOLOGY | Facility: HOSPITAL | Age: 58
End: 2018-03-12

## 2018-03-13 LAB
POC ACTIVATED CLOTTING TIME K: 180 SEC (ref 74–137)
POC ACTIVATED CLOTTING TIME K: 197 SEC (ref 74–137)
POC ACTIVATED CLOTTING TIME K: 202 SEC (ref 74–137)
POC ACTIVATED CLOTTING TIME K: 208 SEC (ref 74–137)
POC ACTIVATED CLOTTING TIME K: 219 SEC (ref 74–137)
SAMPLE: ABNORMAL

## 2018-03-16 ENCOUNTER — PATIENT MESSAGE (OUTPATIENT)
Dept: CARDIOLOGY | Facility: HOSPITAL | Age: 58
End: 2018-03-16

## 2018-03-20 ENCOUNTER — TELEPHONE (OUTPATIENT)
Dept: CARDIOLOGY | Facility: CLINIC | Age: 58
End: 2018-03-20

## 2018-03-20 NOTE — TELEPHONE ENCOUNTER
----- Message from Luana Galo sent at 3/19/2018  4:54 PM CDT -----  Contact: self, [phone]  Patient requests to speak with you regarding 4/13 surgery. Please advise.

## 2018-03-20 NOTE — TELEPHONE ENCOUNTER
----- Message from Britt Duarte sent at 3/20/2018 11:15 AM CDT -----  Contact: [phone]/self  Pt requesting to speak with you concerning his upcoming surgery that's scheduled for 3/30/18  Please call and advise

## 2018-03-21 NOTE — DISCHARGE SUMMARY
[facility]  Discharge Summary      Admit Date: 3/2/2018    Discharge Date and Time: 3/2/2018    Attending Physician: Keo Jenkins    Reason for Admission: claudication    Procedures Performed: Procedure(s) (LRB):  Pta-Peripheral (N/A)    Hospital Course      Details     Narrative        Date of Procedure:  03/02/2018    A. Indication/Pre-Operative Diagnosis     The patient is a 57 year old male that was referred for catheterization by Philippe Segundo for claudication.     B. Summary/Post-Operative Diagnosis       Left SFA intervention for winsome IIb claudication.    75% left SFA with 3 vessel run off.    7 mmHg resting and 33 mmHg hyperemic mean gradient.    Left CFA antegrade.    PTA with 6.0 x 150 mm Lutonix.    C. HPI     I have reviewed the history and physical completed on 03/02/2018. The patient has been examined and the following changes have been noted:        Patient ID:  Domingo Gross Sr. is a 57 y.o. male who presents for follow up of Peripheral Arterial Disease; Claudication; Hyperlipidemia; and Hypertension        HPI:      Domingo Gross Sr. 57 y.o. male is here follow up PAD with winsome IIb claudication. In 6/2017 he had L EIA PTAS for winsome IIb claudication. Despite the intervention and pletal with an exercise he continues to have claudication, L >>R. Winsome   IIb. No ulcers. He is on aspirin and plavix for DAPT.         He has a h/o L SFA atherectomy with 2.2 Phoenix catheter with PTA using using 6.0 x 100 mm Lutonix DCB in 6/2015.  He had distal aorta iliac reconstruction in 6/2014 with 8.0 x 39 mm BES overlapping with 9.0 x 60 in R EIA and 8.0 x 80 mm SES in L EIA   post dilated with 9.0 mm bilaterally.         ELISABETH with stress 5/2017:              R 1.01 to 0.44              L 0.92 to 0.45               After 6 minutes ambulation        CTA 5/2017:                    Patent distal aorta and bilateral GRUPO/EIA stents              L EIA with de shawna 90%  stenosis              L SFA 80% with 3 vessel run off                 R EIA/CFA with moderate disease              R SFA 80% stenosis with 3 vessel run off           Peripheral angiogram with intervention 6/2017         Patent bilateral GRUPO/EIA stents.    De shawna 80% left EIA stenosis treated with 9.0 x 80 mm Lifestar  stent post dilated with 8.0 mm balloon.    Bilateral 70-80% SFA stenosis with 3 vessel run off.                   Patient history was obtained from the patient.     Counseling: The patient was counseled regarding the potential risks and benefits of this procedure, as well as possible alternative approaches to the problem and gave informed consent.    The ASA Score was Class II.     [facility] Risk Score for MACE is 1.20%. [facility] Risk Score for Death is 0.10%.     The patient had a previous angiogram at an outside facility. The films were available for review.     Height: 71 in.      Weight: 195 lbs.      BMI: 27.20 kg/m2    OUTPATIENT MEDICATIONS: Medications were reviewed.  ALLERGIES: Allergies were reviewed.    Laboratory data revealed:     03/02/2018 CREAT  1.1, GLU  107, HCT  33.0, HGB  11.6, INR  1.0, K  2.9, NA  137, PLT  243, PTI  10.3, WBCIR  5.73, BUN  15            Manual Labs:  ACT Reference Range:  sec, ACT-PLUS cartridge Cardiopulmonary Bypass: 410-440 sec, ACT-LR cartridge Indwelling Vascular sheath Removal:  sec  03/02/2018 09:20        D. Hemodynamic Results       AIR REST:  LCFA: 155/73 (99)  LPOP: 135/74 (92)  LPOP: 74/52 (62)  LSFA: 135/74 (95)    E. Angiographic Results     Diagnostic:        - Left Superficial Femoral Artery:             The proximal left SFA has luminal irregularities.             The mid left SFA has a 75% stenosis within the site of the PTA.     - Left Profunda Femoral Artery:             The left PFA is normal.     - Left Popliteal Artery:             The left POP is normal.     - Left Infrapopliteal Artery:             The left  infrapopliteal has luminal irregularities.     - Left Tibioperoneal Trunk:             The left TIBTRUNK has luminal irregularities.     - Left Anterior Tibial Artery:             The left AT has luminal irregularities.     - Left Peroneal Artery:             The left peroneal has luminal irregularities.     - Left Posterior Tibial Artery:             The left PT has luminal irregularities.      Intervention          Mid Left SFA:              The following items were used: Blln Ultraverse .035 [id number] and Blln Lutonix Dc [id number].    F. Details of Procedure     PROCEDURES PERFORMED: Drug Coated Balloon - Peripheral, SFA PTA and Lower Ext Uni Angio    ANESTHESIA: Conscious sedation was achieved with 75 mcg of FENTANYL 100MCG/2ML and 3 mg of Versed. Local anesthesia was achieved with 20 ml of Lidocaine 1%. Moderate conscious sedation was performed and cardiorespiratory functions were monitored the   entire procedure by Mark Anthony Suarez RN. Sedation began at 08:40 AM and concluded at 09:33 AM, totalling 52.5 minutes.     PRIMARY SURGEON: Keo Jenkins MD    COMPLICATIONS: There were no complications.    Medications given on sterile field: Lidocaine 1% (20 ml).    Medications given during procedure: Heparin 1000u/500cc Bag (4 bags), Versed (3 mg), Fentanyl 100mcg/2ml (75 mcg), Benadryl 50mg To (25 mg), Nitroglycerine 200mcg/ml (400 mcg) and Heparin 1000u/ml Inj (6000 units).    The patient was brought to the catheterization laboratory after premedication with oral Benadryl 50 mg. Bilateral groin prepped and draped. The Kit, Manifold was flushed and connected to contrast.  prepped and draped. A Us Probe Cover was applied to the   left groin. After local anesthesia, a Sheath 4fr X 7cm Micropuncture was inserted into the left femoral artery. Access obtained antegrade. Angiography performed in the left femoral artery and unilateral runoff performed.     Left  TIBTRUNK    A Wire Advantage .035 X 260 was inserted into the  left TIBTRUNK. The Sheath 4fr X 7cm Micropuncture was removed. A Sheath 4fr X 11cm was inserted into the left femoral artery.     Left  SFA\ POP    A Cath 4fr Mpa 2 was inserted into the distal left SFA. The Wire Advantage .035 X 260 was removed. Hemodynamics recorded in the distal left POP. The Cath 4fr Mpa 2 was removed. The Wire Advantage .035 X 260 was reinserted into the left POP. The Sheath   4fr X 11cm was removed. A Sheath 6fr X 11cm was inserted into the left femoral artery. The Sheath 6fr X 11cm was removed. A Sheath 6fr X 45cm Destination was inserted into the left femoral artery. Access obtained antegrade. A Blln Ultraverse .035   [id number] was inserted into the mid left SFA and was inflated with indeflator at 6.0 nicolasa. for 3.0 mins. The Blln Ultraverse .035 [id number] was removed. A Blln Lutonix Dc [id number] was inserted into the mid left SFA and was inflated with indeflator at 7.0   nicolasa. for 3.0 mins. The Blln Lutonix Dc [id number] was removed.     The Wire Advantage .035 X 260 was removed. The Sheath 6fr X 45cm Destination was removed and manual pressure was applied. Total Visipaque injected was 75.0 ml. Total Visipaque used was 150.0 ml.       Fluoroscopy Time 4.6 minutes   Radiation Dose 61.8 mGy   Contrast Injected 75 ml Visipaque   Contrast Used  150 ml Visipaque            Procedure log documented by RT Ragini and verified by Keo Jenkins MD    ESTIMATED BLOOD LOSS is < 50 cc.    SPECIMEN: No specimen.     G. Recommendations     1. Routine post-cath care.  2. Risk factor reductions.  3. ASA 81mg.  4. Statin therapy.  5. Return for right SFA PTA in 4 weeks    I certify that I was present for catheter insertion, catheter manipulation, angiography, angiographic interpretation, and all interventional procedures performed on this patient.            Consults: none     Significant Diagnostic Studies: labs, ecg, and angiogram    Final Diagnoses:    Principal Problem: Atherosclerosis of native  artery of both lower extremities with intermittent claudication   Secondary Diagnoses:     Patient Active Problem List    Diagnosis Date Noted    Atherosclerosis of native artery of both lower extremities with intermittent claudication 03/02/2018     Priority: High    Left knee pain 01/19/2016    Pain of left lower extremity 01/19/2016    Difficulty walking 01/19/2016    Acute pain of left knee 12/11/2015    Hyperlipidemia LDL goal <70 06/12/2015    DJD (degenerative joint disease), lumbar 05/27/2015    Lumbar facet arthropathy 05/27/2015    DDD (degenerative disc disease) 05/27/2015    Spondylosis without myelopathy 05/27/2015    Limb pain 11/21/2014    History of back injury 11/21/2014     20 years ago a bag fell on his back at work      Myalgia 11/21/2014    Claudication in peripheral vascular disease 06/13/2014    PAD (peripheral artery disease) 05/21/2014 6/13/2014      1. Three vessel runoff below the knee bilaterally.  2. Severe disease of the terminal aorta.  3. Successful PTAS.  4. Bilateral common iliac reconstruction with 8 x 39 mm stent extending in the aorta  5. Right common illiac was treated with an overlapping 9 x 60 mm SES   6. Left common iliac was treated with an overlapping 8 x 80 mm SES down to external iliac  7. All stents were post dilated with 9.0 x 60 mm balloons  8. Moderate bilateral SFA disease  9. Chronically occluded left internal iliac artery        6/12/2015      1. 80% mid left SFA with a 20 mmHg resting mean gradient  2. Atherectomy with 2.2 Feedskynix Volcano catheter  3. PTA with 6.0 x 100 mm Lutonix drug coated balloon        6/9/2017      Patent bilateral GRUPO/EIA stents  L EIA PTAS 9.0 x 80 mm Life stent post dilated with 8.0 mm balloon  Bilateral 70-80% SFA stenosis with 3 vessel run off          3/2018      L SFA intervention for claudication   75% L SFA with 3 vessel run off   7 mmHg resting and 33 mmHg hyperemic mean gradient         L CFA antegrade  access   PTA with 6.0 x 150 mm   PTA with 6.0 x 150 mm Lutonix   3 vessel run off                 Atherosclerosis of leg with intermittent claudication 04/21/2014     Stevie IIB      Elevated TSH 05/29/2013    HTN (hypertension) 04/29/2013    Elevated fasting blood sugar 04/29/2013       Discharged Condition: stable    Disposition:     DC home      Follow up with PCP      Follow up with Dr. Jenkins or primary cardiologist as scheduled      Cardiac diet      Resume normal activities in 3 days      Follow Up/Patient Instructions:     Medications:      Discharge Medication List as of 3/2/2018  1:14 PM      CONTINUE these medications which have NOT CHANGED    Details   ASPIRIN (ASPIR-81 ORAL) Take 1 tablet by mouth., Starting 12/27/2011, Until Discontinued, Historical Med      atorvastatin (LIPITOR) 40 MG tablet Take 1 tablet (40 mg total) by mouth once daily., Starting Wed 1/17/2018, Until Thu 1/17/2019, Normal      cilostazol (PLETAL) 100 MG Tab Take 1 tablet (100 mg total) by mouth 2 (two) times daily., Starting Wed 7/12/2017, Until Thu 7/12/2018, Normal      clopidogrel (PLAVIX) 75 mg tablet Take 1 tablet (75 mg total) by mouth once daily., Starting Tue 7/11/2017, Normal      hydroCHLOROthiazide (HYDRODIURIL) 25 MG tablet TAKE 1 TABLET(25 MG) BY MOUTH EVERY DAY, Normal      losartan (COZAAR) 100 MG tablet TAKE 1 TABLET(100 MG) BY MOUTH EVERY DAY, Normal      metoprolol succinate (TOPROL-XL) 25 MG 24 hr tablet TAKE 1 TABLET(25 MG) BY MOUTH EVERY DAY, Normal      coenzyme Q10 (COQ-10) 100 mg capsule Take 1 capsule (100 mg total) by mouth once daily., Starting 11/21/2014, Until Sat 11/21/15, Normal              No discharge procedures on file.  Follow-up Information     Keo Jenkins MD In 4 weeks.    Specialties:  Cardiology, INTERVENTIONAL CARDIOLOGY  Contact information:  [address]  [address]  [place] [address] [phone]

## 2018-04-06 NOTE — NURSING
Called and spoke with patient.  Arrival time at  06:00am Friday, April 13th.  Pt verbalizes full understanding of all pre-op instructions including npo after midnight except take meds morning of procedure with a small sip of water. Pt denies any questions.

## 2018-04-13 ENCOUNTER — HOSPITAL ENCOUNTER (OUTPATIENT)
Facility: HOSPITAL | Age: 58
Discharge: HOME | End: 2018-04-13
Attending: INTERNAL MEDICINE | Admitting: INTERNAL MEDICINE
Payer: COMMERCIAL

## 2018-04-13 ENCOUNTER — TELEPHONE (OUTPATIENT)
Dept: CARDIOLOGY | Facility: HOSPITAL | Age: 58
End: 2018-04-13

## 2018-04-13 VITALS
HEART RATE: 62 BPM | OXYGEN SATURATION: 99 % | TEMPERATURE: 98 F | BODY MASS INDEX: 26.6 KG/M2 | SYSTOLIC BLOOD PRESSURE: 147 MMHG | DIASTOLIC BLOOD PRESSURE: 78 MMHG | RESPIRATION RATE: 12 BRPM | WEIGHT: 190 LBS | HEIGHT: 71 IN

## 2018-04-13 DIAGNOSIS — I73.9 PAD (PERIPHERAL ARTERY DISEASE): Primary | ICD-10-CM

## 2018-04-13 DIAGNOSIS — I70.211 ATHEROSCLEROSIS OF NATIVE ARTERY OF RIGHT LOWER EXTREMITY WITH INTERMITTENT CLAUDICATION: ICD-10-CM

## 2018-04-13 DIAGNOSIS — I10 ESSENTIAL (PRIMARY) HYPERTENSION: ICD-10-CM

## 2018-04-13 LAB
ANION GAP SERPL CALC-SCNC: 7 MMOL/L
ANION GAP SERPL CALC-SCNC: 7 MMOL/L
BUN SERPL-MCNC: 20 MG/DL
BUN SERPL-MCNC: 20 MG/DL
CALCIUM SERPL-MCNC: 8.5 MG/DL
CALCIUM SERPL-MCNC: 8.5 MG/DL
CHLORIDE SERPL-SCNC: 100 MMOL/L
CHLORIDE SERPL-SCNC: 100 MMOL/L
CO2 SERPL-SCNC: 31 MMOL/L
CO2 SERPL-SCNC: 31 MMOL/L
CREAT SERPL-MCNC: 1.2 MG/DL
CREAT SERPL-MCNC: 1.2 MG/DL
ERYTHROCYTE [DISTWIDTH] IN BLOOD BY AUTOMATED COUNT: 12.5 %
EST. GFR  (AFRICAN AMERICAN): >60 ML/MIN/1.73 M^2
EST. GFR  (AFRICAN AMERICAN): >60 ML/MIN/1.73 M^2
EST. GFR  (NON AFRICAN AMERICAN): >60 ML/MIN/1.73 M^2
EST. GFR  (NON AFRICAN AMERICAN): >60 ML/MIN/1.73 M^2
GLUCOSE SERPL-MCNC: 111 MG/DL
GLUCOSE SERPL-MCNC: 111 MG/DL
HCT VFR BLD AUTO: 30.8 %
HGB BLD-MCNC: 10.6 G/DL
INR PPP: 1
MCH RBC QN AUTO: 27.4 PG
MCHC RBC AUTO-ENTMCNC: 34.4 G/DL
MCV RBC AUTO: 80 FL
PLATELET # BLD AUTO: 291 K/UL
PMV BLD AUTO: 9 FL
POTASSIUM SERPL-SCNC: 3.2 MMOL/L
POTASSIUM SERPL-SCNC: 3.2 MMOL/L
PROTHROMBIN TIME: 10.3 SEC
RBC # BLD AUTO: 3.87 M/UL
SODIUM SERPL-SCNC: 138 MMOL/L
SODIUM SERPL-SCNC: 138 MMOL/L
WBC # BLD AUTO: 5.96 K/UL

## 2018-04-13 PROCEDURE — 63600175 PHARM REV CODE 636 W HCPCS

## 2018-04-13 PROCEDURE — 85610 PROTHROMBIN TIME: CPT

## 2018-04-13 PROCEDURE — 99152 MOD SED SAME PHYS/QHP 5/>YRS: CPT | Mod: ,,, | Performed by: INTERNAL MEDICINE

## 2018-04-13 PROCEDURE — 93010 ELECTROCARDIOGRAM REPORT: CPT | Mod: ,,, | Performed by: INTERNAL MEDICINE

## 2018-04-13 PROCEDURE — 99152 MOD SED SAME PHYS/QHP 5/>YRS: CPT

## 2018-04-13 PROCEDURE — 93005 ELECTROCARDIOGRAM TRACING: CPT

## 2018-04-13 PROCEDURE — 37224 CATH LAB PROCEDURE: CPT | Mod: RT,,, | Performed by: INTERNAL MEDICINE

## 2018-04-13 PROCEDURE — 36415 COLL VENOUS BLD VENIPUNCTURE: CPT

## 2018-04-13 PROCEDURE — 25000003 PHARM REV CODE 250

## 2018-04-13 PROCEDURE — 25000003 PHARM REV CODE 250: Performed by: INTERNAL MEDICINE

## 2018-04-13 PROCEDURE — 25500020 PHARM REV CODE 255

## 2018-04-13 PROCEDURE — C1894 INTRO/SHEATH, NON-LASER: HCPCS

## 2018-04-13 PROCEDURE — 85027 COMPLETE CBC AUTOMATED: CPT

## 2018-04-13 PROCEDURE — 80048 BASIC METABOLIC PNL TOTAL CA: CPT

## 2018-04-13 RX ORDER — SODIUM CHLORIDE 9 MG/ML
INJECTION, SOLUTION INTRAVENOUS CONTINUOUS
Status: DISCONTINUED | OUTPATIENT
Start: 2018-04-14 | End: 2018-04-13

## 2018-04-13 RX ORDER — DIPHENHYDRAMINE HCL 25 MG
50 CAPSULE ORAL ONCE
Status: DISCONTINUED | OUTPATIENT
Start: 2018-04-14 | End: 2018-04-13

## 2018-04-13 RX ORDER — DIAZEPAM 10 MG/2ML
5 INJECTION INTRAMUSCULAR ONCE
Status: DISCONTINUED | OUTPATIENT
Start: 2018-04-14 | End: 2018-04-13 | Stop reason: RX

## 2018-04-13 RX ORDER — ACETAMINOPHEN 325 MG/1
650 TABLET ORAL EVERY 4 HOURS PRN
Status: DISCONTINUED | OUTPATIENT
Start: 2018-04-13 | End: 2018-04-13 | Stop reason: HOSPADM

## 2018-04-13 RX ORDER — HYDROCODONE BITARTRATE AND ACETAMINOPHEN 5; 325 MG/1; MG/1
1 TABLET ORAL EVERY 4 HOURS PRN
Status: DISCONTINUED | OUTPATIENT
Start: 2018-04-13 | End: 2018-04-13 | Stop reason: HOSPADM

## 2018-04-13 RX ORDER — SODIUM CHLORIDE 9 MG/ML
INJECTION, SOLUTION INTRAVENOUS CONTINUOUS
Status: DISCONTINUED | OUTPATIENT
Start: 2018-04-13 | End: 2018-04-13 | Stop reason: HOSPADM

## 2018-04-13 RX ORDER — DIPHENHYDRAMINE HCL 25 MG
50 CAPSULE ORAL ONCE
Status: DISCONTINUED | OUTPATIENT
Start: 2018-04-13 | End: 2018-04-13 | Stop reason: HOSPADM

## 2018-04-13 RX ADMIN — SODIUM CHLORIDE: 0.9 INJECTION, SOLUTION INTRAVENOUS at 06:04

## 2018-04-13 RX ADMIN — SODIUM CHLORIDE 3 ML/KG/HR: 0.9 INJECTION, SOLUTION INTRAVENOUS at 10:04

## 2018-04-13 NOTE — DISCHARGE INSTRUCTIONS
Discharge Instructions    No driving the day of procedure.   Remove dressing tomorrow then may shower with warm soapy water. Do not scrub site.  Pat dry.   May apply bandaid for 2 days.  No tub baths.  Do not submerge wound in water for 3 days.   No heavy lifting or strenuous activity for 3 days following procedure.    If site swells or bleeds, hold direct pressure to area for 10 full minutes.  If site continues to bleed, continue to hold pressure to site and have someone bring you to the ER.  Follow any additional instructions given to you by MD.  Call MD to schedule a follow up appointment as instructed.

## 2018-04-13 NOTE — BRIEF OP NOTE
S/p R SFA PTA for winsome IIb claudication      R CFA with 50% stenosis-heavily calcified lesion   Baseline /90      R SFA with 70% stenosis with a significant resting and hyperemic mean gradient       3 vessel run off         PTA of R SFA with 6.0 x 150 + 6.0 x 60 mm Lutonix DCB          Plan:        DC home today  Follow up in 4 weeks with ABIX          Full report to follow

## 2018-04-13 NOTE — PLAN OF CARE
12:00  Received report from Nicole Monroy Rn.  AIDET completed to pt. Pt is stable resting in stretcher with sr up x2. Connected to cardiac monitors. VSS. Right groin gauze and tegaderm dressing on c.d.i. No drainage or shadowing noted. No redness, bruising, or hematoma noted around site. +2 radial pulses. dopplered all pedal pulses. Will continue to monitor pt.

## 2018-04-13 NOTE — PLAN OF CARE
Patient lying in bed with no complaints of pain or distress noted.  Monitors applied.  VSS.  Yellow non- skid socks placed on patient. Fall risk review with patient.  Procedure and recovery process explained to patient and all questions answered.  Patient verbalized understanding.  Will continue to monitor.  AIDET completed to pt.

## 2018-04-13 NOTE — TELEPHONE ENCOUNTER
Please SDMP      He needs an ELISABETH with stress prior to follow up in 4 to 5 weeks       ZN

## 2018-04-13 NOTE — H&P
Patient ID:  Domingo Gross Sr. is a 57 y.o. male who presents for follow up of Peripheral Arterial Disease; Claudication; Hyperlipidemia; and Hypertension        HPI:      Domingo Gross Sr. 57 y.o. male is here follow up PAD with winsome IIb claudication. In 6/2017 he had L EIA PTAS for winsome IIb claudication. Despite the intervention and pletal with an exercise he continues to have claudication, L >>R. Winsome IIb. No ulcers. He is on aspirin and plavix for DAPT.         He has a h/o L SFA atherectomy with 2.2 Phoenix catheter with PTA using using 6.0 x 100 mm Lutonix DCB in 6/2015.  He had distal aorta iliac reconstruction in 6/2014 with 8.0 x 39 mm BES overlapping with 9.0 x 60 in R EIA and 8.0 x 80 mm SES in L EIA post dilated with 9.0 mm bilaterally.         ELISABETH with stress 5/2017:              R 1.01 to 0.44              L 0.92 to 0.45               After 6 minutes ambulation        CTA 5/2017:                    Patent distal aorta and bilateral GRUPO/EIA stents              L EIA with de shawna 90% stenosis              L SFA 80% with 3 vessel run off                 R EIA/CFA with moderate disease              R SFA 80% stenosis with 3 vessel run off           Peripheral angiogram with intervention 6/2017         Patent bilateral GRUPO/EIA stents.    De shawna 80% left EIA stenosis treated with 9.0 x 80 mm Lifestar  stent post dilated with 8.0 mm balloon.    Bilateral 70-80% SFA stenosis with 3 vessel run off.                       Patient Active Problem List     Diagnosis Date Noted    Left knee pain 01/19/2016    Pain of left lower extremity 01/19/2016    Difficulty walking 01/19/2016    Acute pain of left knee 12/11/2015    Hyperlipidemia LDL goal <70 06/12/2015    DJD (degenerative joint disease), lumbar 05/27/2015    Lumbar facet arthropathy 05/27/2015    DDD (degenerative disc disease) 05/27/2015    Spondylosis without myelopathy 05/27/2015    Limb pain 11/21/2014    History of  back injury 2014       20 years ago a bag fell on his back at work       Myalgia 2014    Claudication in peripheral vascular disease 2014    PAD (peripheral artery disease) 2014      1. Three vessel runoff below the knee bilaterally.  2. Severe disease of the terminal aorta.  3. Successful PTAS.  4. Bilateral common iliac reconstruction with 8 x 39 mm stent extending in the aorta  5. Right common illiac was treated with an overlapping 9 x 60 mm SES   6. Left common iliac was treated with an overlapping 8 x 80 mm SES down to external iliac  7. All stents were post dilated with 9.0 x 60 mm balloons  8. Moderate bilateral SFA disease  9. Chronically occluded left internal iliac artery           2015        1. 80% mid left SFA with a 20 mmHg resting mean gradient  2. Atherectomy with 2.2 [employer] Rev[employer] Volcano catheter  3. PTA with 6.0 x 100 mm Lutonix drug coated balloon           2017        Patent bilateral GRUPO/EIA stents  L EIA PTAS 9.0 x 80 mm Life stent post dilated with 8.0 mm balloon  Bilateral 70-80% SFA stenosis with 3 vessel run off                Atherosclerosis of leg with intermittent claudication 2014       Stevie IIB       Elevated TSH 2013    HTN (hypertension) 2013    Elevated fasting blood sugar 2013               Right Arm BP - Sittin/76  Left Arm BP - Sittin/72              LAST HbA1c        Lab Results   Component Value Date     HGBA1C 5.8 2013         Lipid panel        Lab Results   Component Value Date     CHOL 183 10/07/2017     CHOL 166 2017     CHOL 118 (L) 2015            Lab Results   Component Value Date     HDL 51 10/07/2017     HDL 50 2017     HDL 41 2015            Lab Results   Component Value Date     LDLCALC 107.0 10/07/2017     LDLCALC 88.6 2017     LDLCALC 54.2 (L) 2015            Lab Results   Component Value Date     TRIG 125 10/07/2017     TRIG 137  01/07/2017     TRIG 114 06/12/2015            Lab Results   Component Value Date     CHOLHDL 27.9 10/07/2017     CHOLHDL 30.1 01/07/2017     CHOLHDL 34.7 06/12/2015               Review of Systems   Constitution: Negative for diaphoresis, weakness, night sweats, weight gain and weight loss.   HENT: Negative for congestion.    Eyes: Negative for blurred vision, discharge and double vision.   Cardiovascular: Negative for chest pain, claudication, cyanosis, dyspnea on exertion, irregular heartbeat, leg swelling, near-syncope, orthopnea, palpitations, paroxysmal nocturnal dyspnea and syncope.   Respiratory: Negative for cough, shortness of breath and wheezing.    Endocrine: Negative for cold intolerance, heat intolerance and polyphagia.   Hematologic/Lymphatic: Negative for adenopathy and bleeding problem. Does not bruise/bleed easily.   Skin: Negative for dry skin and nail changes.   Musculoskeletal: Negative for arthritis, back pain, falls, joint pain, myalgias and neck pain.   Gastrointestinal: Negative for bloating, abdominal pain, change in bowel habit and constipation.   Genitourinary: Negative for bladder incontinence, dysuria, flank pain, genital sores and missed menses.   Neurological: Negative for aphonia, brief paralysis, difficulty with concentration and dizziness.   Psychiatric/Behavioral: Negative for altered mental status and memory loss. The patient does not have insomnia.    Allergic/Immunologic: Negative for environmental allergies.         Objective:   Physical Exam   Constitutional: He is oriented to person, place, and time. He appears well-developed and well-nourished. He is not intubated.   HENT:   Head: Normocephalic and atraumatic.   Right Ear: External ear normal.   Left Ear: External ear normal.   Mouth/Throat: Oropharynx is clear and moist.   Eyes: Conjunctivae and EOM are normal. Pupils are equal, round, and reactive to light. Right eye exhibits no discharge. Left eye exhibits no discharge.  No scleral icterus.   Neck: Normal range of motion. Neck supple. Normal carotid pulses, no hepatojugular reflux and no JVD present. Carotid bruit is not present. No tracheal deviation present. No thyromegaly present.   Cardiovascular: Normal rate, regular rhythm, S1 normal and S2 normal.   No extrasystoles are present. PMI is not displaced.  Exam reveals no gallop, no S3, no distant heart sounds, no friction rub and no midsystolic click.    No murmur heard.  Pulses:       Carotid pulses are 2+ on the right side, and 2+ on the left side.       Radial pulses are 2+ on the right side, and 2+ on the left side.        Femoral pulses are 2+ on the right side, and 2+ on the left side.       Popliteal pulses are 2+ on the right side, and 2+ on the left side.        Dorsalis pedis pulses are 0 on the right side, and 0 on the left side.        Posterior tibial pulses are 0 on the right side, and 0 on the left side.     Biphasic R DP and PT doppler signals      Monphasic L DP and PT doppler signals                     Pulmonary/Chest: Effort normal and breath sounds normal. No accessory muscle usage or stridor. No apnea, no tachypnea and no bradypnea. He is not intubated. No respiratory distress. He has no decreased breath sounds. He has no wheezes. He has no rales. He exhibits no tenderness and no bony tenderness.   Abdominal: He exhibits no distension, no pulsatile liver, no abdominal bruit, no ascites, no pulsatile midline mass and no mass. There is no tenderness. There is no rebound and no guarding.   Musculoskeletal: Normal range of motion. He exhibits no edema or tenderness.   Lymphadenopathy:     He has no cervical adenopathy.   Neurological: He is alert and oriented to person, place, and time. He has normal reflexes. No cranial nerve deficit. Coordination normal.   Skin: Skin is warm. No rash noted. No erythema. No pallor.   Psychiatric: He has a normal mood and affect. His behavior is normal. Judgment and thought  content normal.         Assessment:      1. Atherosclerosis of native artery of both lower extremities with intermittent claudication    2. PAD (peripheral artery disease)    3. Essential hypertension    4. Hyperlipidemia LDL goal <70          Plan:                  Peripheral intervention for lifestyle limiting claudication        L CFA 6 fr sheath   JOSY if there any issues with his CFA access        Atherectomy with PTA using DCB              6 mm balloon        Will call patient with date and time  He opted for a Friday after Mardi [place]as        Risks, benefits and alternatives of peripheral catheterization and possible intervention were discussed with the patient. All questions were answered and informed consent obtained.      I discussed the importance of compliance with dual antiplatelet therapy with the patient to prevent acute or late stent thrombosis with premature discontinuation of the therapy.        Return for R SFA intervention later              Continue with current medical plan and lifestyle changes.  Return sooner for concerns or questions. If symptoms persist go to the ED  I have reviewed all pertinent data on this patient         I have reviewed the patient's medical history in detail and updated the computerized patient record.     Orders Placed This Encounter   Procedures    Case Request-Cath Lab: Pta-Peripheral       Standing Status:   Standing       Number of Occurrences:   1       Order Specific Question:   CPT Code:       Answer:   [place] FEM/POPL REVASC STNT & ATHERECTOMY [61183]         Follow up as scheduled. Return sooner for concerns or questions                 He expressed verbal understanding and agreed with the plan                Patient's Medications   New Prescriptions     No medications on file   Previous Medications     ASPIRIN (ASPIR-81 ORAL)    Take 1 tablet by mouth.     ATORVASTATIN (LIPITOR) 40 MG TABLET    Take 1 tablet (40 mg total) by mouth once daily.     CILOSTAZOL  (PLETAL) 100 MG TAB    Take 1 tablet (100 mg total) by mouth 2 (two) times daily.     CLOPIDOGREL (PLAVIX) 75 MG TABLET    Take 1 tablet (75 mg total) by mouth once daily.     COENZYME Q10 (COQ-10) 100 MG CAPSULE    Take 1 capsule (100 mg total) by mouth once daily.     HYDROCHLOROTHIAZIDE (HYDRODIURIL) 25 MG TABLET    TAKE 1 TABLET(25 MG) BY MOUTH EVERY DAY     LOSARTAN (COZAAR) 100 MG TABLET    TAKE 1 TABLET(100 MG) BY MOUTH EVERY DAY     METOPROLOL SUCCINATE (TOPROL-XL) 25 MG 24 HR TABLET    TAKE 1 TABLET(25 MG) BY MOUTH EVERY DAY   Modified Medications     No medications on file   Discontinued Medications     No medications on file            Addendum:        He is here for R SFA PTA with Lutonix DCB        Antegrade R CFA access      Risks, benefits and alternatives of peripheral catheterization and possible intervention were discussed with the patient. All questions were answered and informed consent obtained.     I discussed the importance of compliance with dual antiplatelet therapy with the patient to prevent acute or late stent thrombosis with premature discontinuation of the therapy.

## 2018-04-13 NOTE — NURSING
Received in recovery at 1010 per stretcher no acute distress noted distress noted call light within stretcher in low position.5 fr sheath in place to  right groin, gauze dressing and tegaderm in place clean dry and intact.

## 2018-04-13 NOTE — PLAN OF CARE
Patient ambulated to bathroom with RN by side.  Pt voided without difficulty. Pt ambulated back to bed without difficulty. Right groin gauze and tegaderm dressing c.d.i. No drainage or shadowing noted. No redness, bruising, or hematoma noted around site. Pt getting dressed without difficulty.

## 2018-04-13 NOTE — NURSING
Manual pressure hold complete at 1145 per Kiersten. Right groin is soft to touch without redness and hematoma, gauze dressing with tegaderm clean dry and intact.

## 2018-04-13 NOTE — PLAN OF CARE
15:30 Patient discharged to home as ordered after 4 hour recovery per Dr. Jenkins.    All discharge instructions, printed materials.    Patient instructed on follow-up appointment as ordered.  Patient verbalized understanding and agreement with all discharge instructions given.   Pt is AAOx3, VSS, denies any pain.    Pt voided without difficulty.  Pt ate 75% of his ordered lunch. No n/v. Pt got dressed and moving around without difficulty, no distress noted.   Right groin gauze and tegaderm dressing c.d.i. No drainage or shadowing noted. No bleeding or hematoma noted around site. Skin normal in color and warm to touch. Palpated bilateral radial pulses and dopplered bilateral pedal pulses.  Pt in w/c.  Right AC 20 gauge iv dc'd as ordered with tip intact. aydin and koban dressing applied c.d.i.  Pt discharged home via wheelchair to private vehicle by pt's wife and daughter.

## 2018-04-16 ENCOUNTER — PATIENT MESSAGE (OUTPATIENT)
Dept: CARDIOLOGY | Facility: CLINIC | Age: 58
End: 2018-04-16

## 2018-04-17 DIAGNOSIS — I10 ESSENTIAL HYPERTENSION: ICD-10-CM

## 2018-04-17 LAB
PERIPHERAL PTA: YES
PERIPHERAL STENOSIS: ABNORMAL

## 2018-04-17 RX ORDER — METOPROLOL SUCCINATE 25 MG/1
TABLET, EXTENDED RELEASE ORAL
Qty: 90 TABLET | Refills: 0 | Status: SHIPPED | OUTPATIENT
Start: 2018-04-17 | End: 2018-04-20 | Stop reason: SDUPTHER

## 2018-04-18 ENCOUNTER — TELEPHONE (OUTPATIENT)
Dept: ADMINISTRATIVE | Facility: HOSPITAL | Age: 58
End: 2018-04-18

## 2018-04-18 NOTE — TELEPHONE ENCOUNTER
Outgoing call to patient regarding Hypertenssion follow-up with Dr Jones. Patient states he is working during the week and has no weekday availability, stated it was ok to call him in 1 week - to see if he can get time off

## 2018-04-19 ENCOUNTER — PATIENT MESSAGE (OUTPATIENT)
Dept: INTERNAL MEDICINE | Facility: CLINIC | Age: 58
End: 2018-04-19

## 2018-04-20 ENCOUNTER — OFFICE VISIT (OUTPATIENT)
Dept: INTERNAL MEDICINE | Facility: CLINIC | Age: 58
End: 2018-04-20
Payer: COMMERCIAL

## 2018-04-20 ENCOUNTER — LAB VISIT (OUTPATIENT)
Dept: LAB | Facility: HOSPITAL | Age: 58
End: 2018-04-20
Attending: INTERNAL MEDICINE
Payer: COMMERCIAL

## 2018-04-20 VITALS
HEART RATE: 80 BPM | WEIGHT: 188.69 LBS | DIASTOLIC BLOOD PRESSURE: 82 MMHG | SYSTOLIC BLOOD PRESSURE: 120 MMHG | BODY MASS INDEX: 26.42 KG/M2 | OXYGEN SATURATION: 98 % | HEIGHT: 71 IN

## 2018-04-20 DIAGNOSIS — E87.6 HYPOKALEMIA: Primary | ICD-10-CM

## 2018-04-20 DIAGNOSIS — I73.9 PAD (PERIPHERAL ARTERY DISEASE): ICD-10-CM

## 2018-04-20 DIAGNOSIS — E78.5 HYPERLIPIDEMIA, UNSPECIFIED HYPERLIPIDEMIA TYPE: ICD-10-CM

## 2018-04-20 DIAGNOSIS — I10 ESSENTIAL HYPERTENSION: ICD-10-CM

## 2018-04-20 DIAGNOSIS — E87.6 HYPOKALEMIA: ICD-10-CM

## 2018-04-20 LAB
ANION GAP SERPL CALC-SCNC: 8 MMOL/L
BUN SERPL-MCNC: 14 MG/DL
CALCIUM SERPL-MCNC: 8.7 MG/DL
CHLORIDE SERPL-SCNC: 99 MMOL/L
CO2 SERPL-SCNC: 30 MMOL/L
CREAT SERPL-MCNC: 1.1 MG/DL
EST. GFR  (AFRICAN AMERICAN): >60 ML/MIN/1.73 M^2
EST. GFR  (NON AFRICAN AMERICAN): >60 ML/MIN/1.73 M^2
GLUCOSE SERPL-MCNC: 76 MG/DL
POTASSIUM SERPL-SCNC: 3.3 MMOL/L
SODIUM SERPL-SCNC: 137 MMOL/L

## 2018-04-20 PROCEDURE — 3074F SYST BP LT 130 MM HG: CPT | Mod: CPTII,S$GLB,, | Performed by: INTERNAL MEDICINE

## 2018-04-20 PROCEDURE — 80048 BASIC METABOLIC PNL TOTAL CA: CPT

## 2018-04-20 PROCEDURE — 36415 COLL VENOUS BLD VENIPUNCTURE: CPT | Mod: PO

## 2018-04-20 PROCEDURE — 99213 OFFICE O/P EST LOW 20 MIN: CPT | Mod: S$GLB,,, | Performed by: INTERNAL MEDICINE

## 2018-04-20 PROCEDURE — 99999 PR PBB SHADOW E&M-EST. PATIENT-LVL III: CPT | Mod: PBBFAC,,, | Performed by: INTERNAL MEDICINE

## 2018-04-20 PROCEDURE — 3079F DIAST BP 80-89 MM HG: CPT | Mod: CPTII,S$GLB,, | Performed by: INTERNAL MEDICINE

## 2018-04-20 RX ORDER — LOSARTAN POTASSIUM 100 MG/1
TABLET ORAL
Qty: 90 TABLET | Refills: 4 | Status: SHIPPED | OUTPATIENT
Start: 2018-04-20 | End: 2019-04-20 | Stop reason: SDUPTHER

## 2018-04-20 RX ORDER — CLOPIDOGREL BISULFATE 75 MG/1
75 TABLET ORAL DAILY
Qty: 90 TABLET | Refills: 4 | Status: SHIPPED | OUTPATIENT
Start: 2018-04-20 | End: 2018-07-23 | Stop reason: SDUPTHER

## 2018-04-20 RX ORDER — HYDROCHLOROTHIAZIDE 25 MG/1
TABLET ORAL
Qty: 90 TABLET | Refills: 4 | Status: SHIPPED | OUTPATIENT
Start: 2018-04-20 | End: 2019-04-20 | Stop reason: SDUPTHER

## 2018-04-20 RX ORDER — ATORVASTATIN CALCIUM 40 MG/1
40 TABLET, FILM COATED ORAL DAILY
Qty: 90 TABLET | Refills: 4 | Status: SHIPPED | OUTPATIENT
Start: 2018-04-20 | End: 2019-04-20 | Stop reason: SDUPTHER

## 2018-04-20 RX ORDER — METOPROLOL SUCCINATE 25 MG/1
TABLET, EXTENDED RELEASE ORAL
Qty: 90 TABLET | Refills: 4 | Status: SHIPPED | OUTPATIENT
Start: 2018-04-20 | End: 2019-08-09 | Stop reason: SDUPTHER

## 2018-04-20 NOTE — PROGRESS NOTES
Subjective:       Patient ID: Domingo Gross Sr. is a 57 y.o. male.    Chief Complaint: Follow-up (est pt follow up`)    HPI Mr. Gross is a 57-year-old male with CAD, hypertension, peripheral arterial disease, hyperlipidemia, arthritis, and seasonal ALLERGIES who presents to [facility] care.  He has no acute complaints today.  He recently had a surgery by Dr. Jenkins for treatment of his peripheral arterial disease.  All of his medical problems are stable.    Review of Systems   Constitutional: Negative for activity change and unexpected weight change.   HENT: Negative for hearing loss, rhinorrhea and trouble swallowing.    Eyes: Negative for discharge and visual disturbance.   Respiratory: Negative for chest tightness and wheezing.    Cardiovascular: Negative for chest pain and palpitations.   Gastrointestinal: Negative for blood in stool, constipation, diarrhea and vomiting.   Endocrine: Negative for polydipsia and polyuria.   Genitourinary: Negative for difficulty urinating, hematuria and urgency.   Musculoskeletal: Negative for arthralgias, joint swelling and neck pain.   Neurological: Negative for weakness and headaches.   Psychiatric/Behavioral: Negative for confusion and dysphoric mood.       Objective:      Physical Exam   Constitutional: He is oriented to person, place, and time. He appears well-developed and well-nourished. No distress.   HENT:   Head: Normocephalic and atraumatic.   Cardiovascular: Normal rate, regular rhythm, normal heart sounds and intact distal pulses.  Exam reveals no gallop and no friction rub.    No murmur heard.  Pulmonary/Chest: Effort normal and breath sounds normal. No respiratory distress. He has no wheezes. He has no rales.   Musculoskeletal: He exhibits no edema or deformity.   Neurological: He is alert and oriented to person, place, and time.   Skin: Skin is warm and dry. He is not diaphoretic.   Psychiatric: He has a normal mood and affect. His behavior is normal.  Judgment and thought content normal.   Vitals reviewed.      Assessment:       1. Hypokalemia    2. Peripheral arterial occlusive disease    3. Hyperlipidemia, unspecified hyperlipidemia type    4. PAD (peripheral artery disease)    5. Essential hypertension        Plan:     #1 hypokalemia  Hypokalemia has been noted on his prior labs.  We are checking a BMP today to determine if patient may benefit from chronic potassium repletion.    #2 peripheral arterial disease  Patient followed by Dr. Jenkins  Continue current medications.    #3 hyperlipidemia  Last lipids checked in October 2017.  Continue atorvastatin 40 mg daily.    #4 essential hypertension  Stable, well controlled.  Continue current medications    RTC one year and PRN

## 2018-04-24 ENCOUNTER — PATIENT MESSAGE (OUTPATIENT)
Dept: CARDIOLOGY | Facility: CLINIC | Age: 58
End: 2018-04-24

## 2018-04-25 ENCOUNTER — PATIENT MESSAGE (OUTPATIENT)
Dept: CARDIOLOGY | Facility: CLINIC | Age: 58
End: 2018-04-25

## 2018-04-25 ENCOUNTER — TELEPHONE (OUTPATIENT)
Dept: CARDIOLOGY | Facility: CLINIC | Age: 58
End: 2018-04-25

## 2018-04-25 LAB
POC ACTIVATED CLOTTING TIME K: 197 SEC (ref 74–137)
POC ACTIVATED CLOTTING TIME K: 202 SEC (ref 74–137)
POC ACTIVATED CLOTTING TIME K: 208 SEC (ref 74–137)
SAMPLE: ABNORMAL

## 2018-04-25 NOTE — TELEPHONE ENCOUNTER
----- Message from Fatoumata Trejo sent at 4/25/2018 11:16 AM CDT -----  Contact: [phone]/ self   Patient called in requesting to speak with you. Did not specify why. Please advise.

## 2018-04-27 ENCOUNTER — PATIENT MESSAGE (OUTPATIENT)
Dept: CARDIOLOGY | Facility: CLINIC | Age: 58
End: 2018-04-27

## 2018-04-27 ENCOUNTER — TELEPHONE (OUTPATIENT)
Dept: CARDIOLOGY | Facility: CLINIC | Age: 58
End: 2018-04-27

## 2018-04-27 NOTE — TELEPHONE ENCOUNTER
----- Message from Elizabeth Cardenas sent at 4/26/2018  5:29 PM CDT -----  Contact: Self [phone]  Patient is calling to talk to nurse concerning he is starting to nikki by his right ankle after his procedure.

## 2018-04-30 ENCOUNTER — TELEPHONE (OUTPATIENT)
Dept: CARDIOLOGY | Facility: HOSPITAL | Age: 58
End: 2018-04-30

## 2018-04-30 DIAGNOSIS — I73.9 PAD (PERIPHERAL ARTERY DISEASE): Primary | ICD-10-CM

## 2018-05-01 ENCOUNTER — PATIENT MESSAGE (OUTPATIENT)
Dept: ADMINISTRATIVE | Facility: HOSPITAL | Age: 58
End: 2018-05-01

## 2018-05-01 LAB
POC ACTIVATED CLOTTING TIME K: 186 SEC (ref 74–137)
POC ACTIVATED CLOTTING TIME K: 202 SEC (ref 74–137)
SAMPLE: ABNORMAL
SAMPLE: ABNORMAL

## 2018-05-02 ENCOUNTER — HOSPITAL ENCOUNTER (OUTPATIENT)
Dept: RADIOLOGY | Facility: HOSPITAL | Age: 58
Discharge: HOME | End: 2018-05-02
Attending: INTERNAL MEDICINE
Payer: COMMERCIAL

## 2018-05-02 DIAGNOSIS — I73.9 PAD (PERIPHERAL ARTERY DISEASE): ICD-10-CM

## 2018-05-02 PROCEDURE — 93925 LOWER EXTREMITY STUDY: CPT | Mod: TC

## 2018-05-02 PROCEDURE — 93970 EXTREMITY STUDY: CPT | Mod: TC

## 2018-05-02 PROCEDURE — 93970 EXTREMITY STUDY: CPT | Mod: 26,,, | Performed by: RADIOLOGY

## 2018-05-02 PROCEDURE — 93925 LOWER EXTREMITY STUDY: CPT | Mod: 26,,, | Performed by: RADIOLOGY

## 2018-05-02 NOTE — HOSPITAL COURSE
Date of Procedure:  04/13/2018    A. Indication/Pre-Operative Diagnosis     The patient is a 57 year old male that was referred for catheterization by Philippe Segundo for claudication.     B. Summary/Post-Operative Diagnosis       S/p right SFA PTA for winsome IIb claudication.    50% right CFA stenosis-heavily calcified lesion with baseline /90.    75% mid and distal right SFA with 70% stenosis with a significant gradient.    22 mmHg resting mean gradient.    3 vessel run off.    PTA of right SFA with 6.0 x 150 + 6.0 x 60 mm Lutonix DCB.    C. HPI     I have reviewed the history and physical completed on 04/17/2018. The patient has been examined and the following changes have been noted:    Patient ID:  Domingo Gross Sr. is a 57 y.o. male who presents for follow up of Peripheral Arterial Disease; Claudication; Hyperlipidemia; and Hypertension        HPI:      Domingo Gross Sr. 57 y.o. male is here follow up PAD with winsome IIb claudication. In 6/2017 he had L EIA PTAS for winsome IIb claudication. Despite the intervention and pletal with an exercise he continues to have claudication, L >>R. Winosme   IIb. No ulcers. He is on aspirin and plavix for DAPT.         He has a h/o L SFA atherectomy with 2.2 Phoenix catheter with PTA using using 6.0 x 100 mm Lutonix DCB in 6/2015.  He had distal aorta iliac reconstruction in 6/2014 with 8.0 x 39 mm BES overlapping with 9.0 x 60 in R EIA and 8.0 x 80 mm SES in L EIA   post dilated with 9.0 mm bilaterally.         ELISABETH with stress 5/2017:              R 1.01 to 0.44              L 0.92 to 0.45               After 6 minutes ambulation        CTA 5/2017:                    Patent distal aorta and bilateral GRUPO/EIA stents              L EIA with de shawna 90% stenosis              L SFA 80% with 3 vessel run off                 R EIA/CFA with moderate disease              R SFA 80% stenosis with 3 vessel run off           Peripheral angiogram with  intervention 6/2017         Patent bilateral GRUPO/EIA stents.    De shawna 80% left EIA stenosis treated with 9.0 x 80 mm Lifestar  stent post dilated with 8.0 mm balloon.    Bilateral 70-80% SFA stenosis with 3 vessel run off.       Patient history was obtained from the patient.     Counseling: The patient was counseled regarding the potential risks and benefits of this procedure, as well as possible alternative approaches to the problem and gave informed consent.    The ASA Score was Class II.     [facility] Risk Score for MACE is 1.80%. [facility] Risk Score for Death is 0.20%.     The patient had a previous angiogram at an outside facility. The films were available for review.     Height: 71 in.      Weight: 190 lbs.      BMI: 26.50 kg/m2    OUTPATIENT MEDICATIONS: Medications were reviewed.  ALLERGIES: Allergies were reviewed.    Laboratory data revealed:     04/13/2018 CREAT  1.2, GLU  111, HCT  30.8, HGB  10.6, INR  1.0, K  3.2, NA  138, PLT  291, PTI  10.3, WBCIR  5.96, BUN  20            Manual Labs:  ACT Reference Range:  sec, ACT-PLUS cartridge Cardiopulmonary Bypass: 410-440 sec, ACT-LR cartridge Indwelling Vascular sheath Removal:  sec  04/13/2018 09:35  , 09:50        D. Hemodynamic Results       AIR REST:  RSFA: 160/78 (108)  RPOP: 118/71 (88)    AIR REST:  RPOP: 78/56 (66)    E. Angiographic Results     Diagnostic:        - Common Femoral Artery:             The right CFA has a 50% stenosis.                     Lesion Details:  Moderate to heavy calcification.     - Right Superficial Femoral Artery:             The mid right SFA has a 75% stenosis. 20 mmHg resting mean gradient             The distal right SFA has a 75% stenosis.     - Right Popliteal Artery:             The right POP has luminal irregularities.     - Right Anterior Tibial Artery:             The right AT has luminal irregularities.     - Right Posterior Tibial Artery:             The right PT has luminal  irregularities.     - Right Peroneal Artery:             The right peroneal has luminal irregularities.      Intervention          Mid Right SFA:              The lesion was successfully intervened. Post-stenosis of 0%. The vessel was accessed natively.  The following items were used: Blln Lutonix Dc [id number].       Distal Right SFA:              The lesion was successfully intervened. Post-stenosis of 0%. The vessel was accessed natively.  The following items were used: Blln Ultraverse .035 [id number].    F. Details of Procedure     PROCEDURES PERFORMED: Drug Coated Balloon - Peripheral, SFA PTA and Upper Extremity Angio    ANESTHESIA: Conscious sedation was achieved with 125 mcg of FENTANYL 100MCG/2ML and 3 mg of Versed. Local anesthesia was achieved with 20 ml of Lidocaine 1%. Moderate conscious sedation was performed and cardiorespiratory functions were monitored the   entire procedure by Mark Anthony Suarez RN. Sedation began at 08:55 AM and concluded at 10:00 AM, totalling 64.6 minutes.     PRIMARY SURGEON: Keo Jenkins MD    COMPLICATIONS: There were no complications.    Medications given on sterile field: Lidocaine 1% (20 ml).    Medications given during procedure: Heparin 1000u/500cc Bag (3 bags), Fentanyl 100mcg/2ml (125 mcg), Versed (3 mg), Benadryl 50mg To (25 mg), Nitroglycerine 200mcg/ml (600 mcg) and Heparin 1000u/ml Inj (8000 units).    The patient was brought to the catheterization laboratory after premedication with oral Benadryl 50 mg. Bilateral groin prepped and draped. The Kit, Manifold was flushed and connected to contrast. A Us Probe Cover was applied to the right groin. After   local anesthesia, a Micropuncture Stiff was inserted into the right femoral artery and manual pressure was applied. Angiography performed in the right femoral artery and unilateral runoff performed. 400.0 mcg of Nitroglycerine 200mcg/ml was injected   intraarterial per MD.     Right  CFA    A Wire J .035 X 150 was inserted  into the distal right CFA. The Micropuncture Stiff was removed. A Sheath 5fr X 10cm was inserted into the right femoral artery. The Wire J .035 X 150 was removed.     Right  POP    A Wire Advantage 014 300cm was inserted into the right POP. A Cath 4fr Mpa 2 was inserted into the right POP. The Wire Advantage 014 300cm was removed. 200.0 mcg of Nitroglycerine 200mcg/ml was injected intraarterial per MD.     Right  TIBTRUNK    The Wire Advantage 014 300cm was repositioned into the right TIBTRUNK. The Cath 4fr Mpa 2 was removed. A Wire Advantage 014 300cm was inserted into the right TIBTRUNK.     Right  SFA    A Blln Ultraverse .035 [id number] was inserted into the distal right SFA and was inflated with indeflator at 12.0 nicolasa. for 3.0 mins. Blln Ultraverse .035 [id number] was inflated with indeflator at 12.0 nicolasa. for 2.5 mins. The Blln Ultraverse .035 [id number] was   removed. A Blln Ultraverse .035 [id number] was inserted into the distal right SFA and was inflated with indeflator at 12.0 nicolasa. for 3.0 mins. The Blln Ultraverse .035 [id number] was removed. A Blln Lutonix Dc [id number] was inserted into the mid right SFA and   was inflated with indeflator at 12.0 nicolasa. for 3.0 mins. The Blln Lutonix Dc [id number] was removed.     The Wire Advantage 014 300cm was removed. The Sheath 5fr X 10cm was removed and manual pressure was applied. Total Visipaque injected was 80.0 ml. Total Visipaque used was 150.0 ml.       Fluoroscopy Time 5.8 minutes   Radiation Dose 173.2 mGy   Contrast Injected 80 ml Visipaque   Contrast Used  150 ml Visipaque            Procedure log documented by RT Ragini and verified by Keo Jenkins MD    ESTIMATED BLOOD LOSS is < 50 cc.    SPECIMEN: No specimen.     G. Recommendations     1. Routine post-cath care.  2. Risk factor reductions.  3. ASA 81mg.  4. Statin therapy.  5. Schedule for ELISABETH.  6. Schedule for limb ultrasound.  7. Plavix 75 mg daily    I certify that I was present for catheter insertion,  catheter manipulation, angiography, angiographic interpretation, and all interventional procedures performed on this patient.     This document has been electronically    SIGNED BY: Keo Jenkins MD On: 04/17/2018 13:05        Patient was recovered without incident and discharged to home to follow up in 2-4 weeks.

## 2018-05-02 NOTE — DISCHARGE SUMMARY
[facility]  Cardiology  Discharge Summary      Patient Name: Domingo Gross Sr.  MRN: [id number]  Admission Date: 4/13/2018  Hospital Length of Stay: 0 days  Discharge Date and Time: 4/13/2018  3:30 PM  Attending Physician: No att. providers found    Discharging Provider: Karlos Zimmerman NP  Primary Care Physician: Analy Encarnacion MD    HPI:   Domingo Grsos Sr. 57 y.o. male is here follow up PAD with winsome IIb claudication. In 6/2017 he had L EIA PTAS for winsome IIb claudication. Despite the intervention and pletal with an exercise he continues to have claudication, L >>R. Winsome IIb. No ulcers. He is on aspirin and plavix for DAPT.         He has a h/o L SFA atherectomy with 2.2 Phoenix catheter with PTA using using 6.0 x 100 mm Lutonix DCB in 6/2015.  He had distal aorta iliac reconstruction in 6/2014 with 8.0 x 39 mm BES overlapping with 9.0 x 60 in R EIA and 8.0 x 80 mm SES in L EIA post dilated with 9.0 mm bilaterally.         ELISABETH with stress 5/2017:              R 1.01 to 0.44              L 0.92 to 0.45               After 6 minutes ambulation        CTA 5/2017:                    Patent distal aorta and bilateral GRUPO/EIA stents              L EIA with de shawna 90% stenosis              L SFA 80% with 3 vessel run off                 R EIA/CFA with moderate disease              R SFA 80% stenosis with 3 vessel run off           Peripheral angiogram with intervention 6/2017         Patent bilateral GRUPO/EIA stents.    De shawna 80% left EIA stenosis treated with 9.0 x 80 mm Lifestar  stent post dilated with 8.0 mm balloon.    Bilateral 70-80% SFA stenosis with 3 vessel run off.    Procedure(s) (LRB):  Pta-Peripheral (N/A)     Indwelling Lines/Drains at time of discharge:  Lines/Drains/Airways          No matching active lines, drains, or airways          Hospital Course:  Date of Procedure:  04/13/2018    A. Indication/Pre-Operative Diagnosis     The patient is a 57 year old  male that was referred for catheterization by Philippe Segundo for claudication.     B. Summary/Post-Operative Diagnosis       S/p right SFA PTA for winsome IIb claudication.    50% right CFA stenosis-heavily calcified lesion with baseline /90.    75% mid and distal right SFA with 70% stenosis with a significant gradient.    22 mmHg resting mean gradient.    3 vessel run off.    PTA of right SFA with 6.0 x 150 + 6.0 x 60 mm Lutonix DCB.    C. HPI     I have reviewed the history and physical completed on 04/17/2018. The patient has been examined and the following changes have been noted:    Patient ID:  Domingo Gross Sr. is a 57 y.o. male who presents for follow up of Peripheral Arterial Disease; Claudication; Hyperlipidemia; and Hypertension        HPI:      Domingo Gross Sr. 57 y.o. male is here follow up PAD with winsome IIb claudication. In 6/2017 he had L EIA PTAS for winsome IIb claudication. Despite the intervention and pletal with an exercise he continues to have claudication, L >>R. Winsome   IIb. No ulcers. He is on aspirin and plavix for DAPT.         He has a h/o L SFA atherectomy with 2.2 Phoenix catheter with PTA using using 6.0 x 100 mm Lutonix DCB in 6/2015.  He had distal aorta iliac reconstruction in 6/2014 with 8.0 x 39 mm BES overlapping with 9.0 x 60 in R EIA and 8.0 x 80 mm SES in L EIA   post dilated with 9.0 mm bilaterally.         ELISABETH with stress 5/2017:              R 1.01 to 0.44              L 0.92 to 0.45               After 6 minutes ambulation        CTA 5/2017:                    Patent distal aorta and bilateral GRUPO/EIA stents              L EIA with de shawna 90% stenosis              L SFA 80% with 3 vessel run off                 R EIA/CFA with moderate disease              R SFA 80% stenosis with 3 vessel run off           Peripheral angiogram with intervention 6/2017         Patent bilateral GRUPO/EIA stents.    De shawna 80% left EIA stenosis treated with 9.0 x  80 mm Lifestar  stent post dilated with 8.0 mm balloon.    Bilateral 70-80% SFA stenosis with 3 vessel run off.       Patient history was obtained from the patient.     Counseling: The patient was counseled regarding the potential risks and benefits of this procedure, as well as possible alternative approaches to the problem and gave informed consent.    The ASA Score was Class II.     [facility] Risk Score for MACE is 1.80%. [facility] Risk Score for Death is 0.20%.     The patient had a previous angiogram at an outside facility. The films were available for review.     Height: 71 in.      Weight: 190 lbs.      BMI: 26.50 kg/m2    OUTPATIENT MEDICATIONS: Medications were reviewed.  ALLERGIES: Allergies were reviewed.    Laboratory data revealed:     04/13/2018 CREAT  1.2, GLU  111, HCT  30.8, HGB  10.6, INR  1.0, K  3.2, NA  138, PLT  291, PTI  10.3, WBCIR  5.96, BUN  20            Manual Labs:  ACT Reference Range:  sec, ACT-PLUS cartridge Cardiopulmonary Bypass: 410-440 sec, ACT-LR cartridge Indwelling Vascular sheath Removal:  sec  04/13/2018 09:35  , 09:50        D. Hemodynamic Results       AIR REST:  RSFA: 160/78 (108)  RPOP: 118/71 (88)    AIR REST:  RPOP: 78/56 (66)    E. Angiographic Results     Diagnostic:        - Common Femoral Artery:             The right CFA has a 50% stenosis.                     Lesion Details:  Moderate to heavy calcification.     - Right Superficial Femoral Artery:             The mid right SFA has a 75% stenosis. 20 mmHg resting mean gradient             The distal right SFA has a 75% stenosis.     - Right Popliteal Artery:             The right POP has luminal irregularities.     - Right Anterior Tibial Artery:             The right AT has luminal irregularities.     - Right Posterior Tibial Artery:             The right PT has luminal irregularities.     - Right Peroneal Artery:             The right peroneal has luminal  irregularities.      Intervention          Mid Right SFA:              The lesion was successfully intervened. Post-stenosis of 0%. The vessel was accessed natively.  The following items were used: Blln Lutonix Dc [id number].       Distal Right SFA:              The lesion was successfully intervened. Post-stenosis of 0%. The vessel was accessed natively.  The following items were used: Blln Ultraverse .035 [id number].    F. Details of Procedure     PROCEDURES PERFORMED: Drug Coated Balloon - Peripheral, SFA PTA and Upper Extremity Angio    ANESTHESIA: Conscious sedation was achieved with 125 mcg of FENTANYL 100MCG/2ML and 3 mg of Versed. Local anesthesia was achieved with 20 ml of Lidocaine 1%. Moderate conscious sedation was performed and cardiorespiratory functions were monitored the   entire procedure by Mark Anthony Suarez RN. Sedation began at 08:55 AM and concluded at 10:00 AM, totalling 64.6 minutes.     PRIMARY SURGEON: Keo Jenkins MD    COMPLICATIONS: There were no complications.    Medications given on sterile field: Lidocaine 1% (20 ml).    Medications given during procedure: Heparin 1000u/500cc Bag (3 bags), Fentanyl 100mcg/2ml (125 mcg), Versed (3 mg), Benadryl 50mg To (25 mg), Nitroglycerine 200mcg/ml (600 mcg) and Heparin 1000u/ml Inj (8000 units).    The patient was brought to the catheterization laboratory after premedication with oral Benadryl 50 mg. Bilateral groin prepped and draped. The Kit, Manifold was flushed and connected to contrast. A Us Probe Cover was applied to the right groin. After   local anesthesia, a Micropuncture Stiff was inserted into the right femoral artery and manual pressure was applied. Angiography performed in the right femoral artery and unilateral runoff performed. 400.0 mcg of Nitroglycerine 200mcg/ml was injected   intraarterial per MD.     Right  CFA    A Wire J .035 X 150 was inserted into the distal right CFA. The Micropuncture Stiff was removed. A Sheath 5fr X 10cm was  inserted into the right femoral artery. The Wire J .035 X 150 was removed.     Right  POP    A Wire Advantage 014 300cm was inserted into the right POP. A Cath 4fr Mpa 2 was inserted into the right POP. The Wire Advantage 014 300cm was removed. 200.0 mcg of Nitroglycerine 200mcg/ml was injected intraarterial per MD.     Right  TIBTRUNK    The Wire Advantage 014 300cm was repositioned into the right TIBTRUNK. The Cath 4fr Mpa 2 was removed. A Wire Advantage 014 300cm was inserted into the right TIBTRUNK.     Right  SFA    A Blln Ultraverse .035 [id number] was inserted into the distal right SFA and was inflated with indeflator at 12.0 nicolasa. for 3.0 mins. Blln Ultraverse .035 [id number] was inflated with indeflator at 12.0 nicolasa. for 2.5 mins. The Blln Ultraverse .035 [id number] was   removed. A Blln Ultraverse .035 [id number] was inserted into the distal right SFA and was inflated with indeflator at 12.0 nicolasa. for 3.0 mins. The Blln Ultraverse .035 [id number] was removed. A Blln Lutonix Dc [id number] was inserted into the mid right SFA and   was inflated with indeflator at 12.0 nicolasa. for 3.0 mins. The Blln Lutonix Dc [id number] was removed.     The Wire Advantage 014 300cm was removed. The Sheath 5fr X 10cm was removed and manual pressure was applied. Total Visipaque injected was 80.0 ml. Total Visipaque used was 150.0 ml.       Fluoroscopy Time 5.8 minutes   Radiation Dose 173.2 mGy   Contrast Injected 80 ml Visipaque   Contrast Used  150 ml Visipaque            Procedure log documented by RT Ragini and verified by Keo Jenkins MD    ESTIMATED BLOOD LOSS is < 50 cc.    SPECIMEN: No specimen.     G. Recommendations     1. Routine post-cath care.  2. Risk factor reductions.  3. ASA 81mg.  4. Statin therapy.  5. Schedule for ELISABETH.  6. Schedule for limb ultrasound.  7. Plavix 75 mg daily    I certify that I was present for catheter insertion, catheter manipulation, angiography, angiographic interpretation, and all interventional  procedures performed on this patient.     This document has been electronically    SIGNED BY: Keo Jenkins MD On: 04/17/2018 13:05        Patient was recovered without incident and discharged to home to follow up in 2-4 weeks.     Consults:     Significant Diagnostic Studies: Labs:   BMP: No results for input(s): GLU, NA, K, CL, CO2, BUN, CREATININE, CALCIUM, MG in the last 48 hours., CMP No results for input(s): NA, K, CL, CO2, GLU, BUN, CREATININE, CALCIUM, PROT, ALBUMIN, BILITOT, ALKPHOS, AST, ALT, ANIONGAP, ESTGFRAFRICA, EGFRNONAA in the last 48 hours., CBC No results for input(s): WBC, HGB, HCT, PLT in the last 48 hours., Lipid Panel   Lab Results   Component Value Date    CHOL 183 10/07/2017    HDL 51 10/07/2017    LDLCALC 107.0 10/07/2017    TRIG 125 10/07/2017    CHOLHDL 27.9 10/07/2017   , Troponin No results for input(s): TROPONINI in the last 168 hours. and All labs within the past 24 hours have been reviewed  Cardiac Graphics: Echocardiogram: 2D echo with color flow doppler: No results found for this or any previous visit.    Pending Diagnostic Studies:     None          Final Active Diagnoses:    Diagnosis Date Noted POA    PRINCIPAL PROBLEM:  Atherosclerosis of native artery of right lower extremity with intermittent claudication [I70.211] 04/13/2018 Yes    Atherosclerosis of native artery of both lower extremities with intermittent claudication [I70.213] 03/02/2018 Yes    Hyperlipidemia LDL goal <70 [E78.5] 06/12/2015 Yes    PAD (peripheral artery disease) [I73.9] 05/21/2014 Yes    HTN (hypertension) [I10] 04/29/2013 Yes      Problems Resolved During this Admission:    Diagnosis Date Noted Date Resolved POA     No new Assessment & Plan notes have been filed under this hospital service since the last note was generated.  Service: Cardiology      Discharged Condition: good    Disposition: Home or Self Care    Follow Up:  Follow-up Information     Follow up In 5 weeks.           Keo Jenkins MD.     Specialties:  Cardiology, INTERVENTIONAL CARDIOLOGY  Contact information:  [address]  [address]  [place] [address]  [phone]                 Patient Instructions:   No discharge procedures on file.  Medications:  Reconciled Home Medications:      Medication List      CONTINUE taking these medications    ASPIR-81 ORAL  Take 1 tablet by mouth.     cilostazol 100 MG Tab  Commonly known as:  PLETAL  Take 1 tablet (100 mg total) by mouth 2 (two) times daily.     coenzyme Q10 100 mg capsule  Commonly known as:  COQ-10  Take 1 capsule (100 mg total) by mouth once daily.            Time spent on the discharge of patient: 30 minutes    Karlos Zimmerman NP  Cardiology  [facility]

## 2018-05-05 ENCOUNTER — PATIENT MESSAGE (OUTPATIENT)
Dept: CARDIOLOGY | Facility: CLINIC | Age: 58
End: 2018-05-05

## 2018-05-15 ENCOUNTER — HOSPITAL ENCOUNTER (OUTPATIENT)
Dept: CARDIOLOGY | Facility: HOSPITAL | Age: 58
Discharge: HOME | End: 2018-05-15
Attending: INTERNAL MEDICINE
Payer: COMMERCIAL

## 2018-05-15 DIAGNOSIS — I73.9 PAD (PERIPHERAL ARTERY DISEASE): ICD-10-CM

## 2018-05-15 PROCEDURE — 93924 LWR XTR VASC STDY BILAT: CPT | Mod: 26,,, | Performed by: INTERNAL MEDICINE

## 2018-05-15 PROCEDURE — 93924 LWR XTR VASC STDY BILAT: CPT

## 2018-05-17 LAB — VASCULAR ANKLE BRACHIAL INDEX (ABI) LEFT: 0.91 (ref 0.9–1.2)

## 2018-05-18 ENCOUNTER — PATIENT MESSAGE (OUTPATIENT)
Dept: CARDIOLOGY | Facility: CLINIC | Age: 58
End: 2018-05-18

## 2018-06-04 ENCOUNTER — OFFICE VISIT (OUTPATIENT)
Dept: CARDIOLOGY | Facility: CLINIC | Age: 58
End: 2018-06-04
Payer: COMMERCIAL

## 2018-06-04 VITALS
WEIGHT: 190 LBS | SYSTOLIC BLOOD PRESSURE: 120 MMHG | BODY MASS INDEX: 26.6 KG/M2 | HEART RATE: 73 BPM | DIASTOLIC BLOOD PRESSURE: 71 MMHG | HEIGHT: 71 IN

## 2018-06-04 DIAGNOSIS — E78.5 HYPERLIPIDEMIA LDL GOAL <70: ICD-10-CM

## 2018-06-04 DIAGNOSIS — I87.2 VENOUS INSUFFICIENCY OF BOTH LOWER EXTREMITIES: Primary | ICD-10-CM

## 2018-06-04 DIAGNOSIS — R60.0 LOCALIZED EDEMA: ICD-10-CM

## 2018-06-04 DIAGNOSIS — I10 ESSENTIAL HYPERTENSION: ICD-10-CM

## 2018-06-04 DIAGNOSIS — I73.9 PAD (PERIPHERAL ARTERY DISEASE): ICD-10-CM

## 2018-06-04 PROCEDURE — 3078F DIAST BP <80 MM HG: CPT | Mod: CPTII,S$GLB,, | Performed by: INTERNAL MEDICINE

## 2018-06-04 PROCEDURE — 3074F SYST BP LT 130 MM HG: CPT | Mod: CPTII,S$GLB,, | Performed by: INTERNAL MEDICINE

## 2018-06-04 PROCEDURE — 99999 PR PBB SHADOW E&M-EST. PATIENT-LVL III: CPT | Mod: PBBFAC,,, | Performed by: INTERNAL MEDICINE

## 2018-06-04 PROCEDURE — 99215 OFFICE O/P EST HI 40 MIN: CPT | Mod: S$GLB,,, | Performed by: INTERNAL MEDICINE

## 2018-06-04 PROCEDURE — 3008F BODY MASS INDEX DOCD: CPT | Mod: CPTII,S$GLB,, | Performed by: INTERNAL MEDICINE

## 2018-06-05 NOTE — PATIENT INSTRUCTIONS
Understanding Chronic Venous Insufficiency  Problems with the veins in the legs may lead to chronic venous insufficiency (CVI). CVI means that there is a long-term problem with the veins not being able to pump blood back to your heart. When this happens, blood stays in the legs and causes swelling and aching.   Two problems that may lead to chronic venous insufficiency are:  · Damaged valves. Valves keep blood flowing from the legs through the blood vessels and back to the heart. When the valves are damaged, blood does not flow as well.   · Deep vein thrombosis (DVT). Blood clots may form in the deep veins of the legs. This may cause pain, redness, and swelling in the legs. It may also block the flow of blood back to the heart. Seek immediate medical care if you have these symptoms.  · A blood clot in the leg can also break off and travel to the lungs. This is called pulmonary embolism (PE). In the lungs, the clot can cut off the flow of blood. This may cause chest pain, trouble breathing, sweating, a fast heartbeat, coughing (may cough up blood), and fainting. It is a medical emergency and may cause death. Call 911 if you have these symptoms.  · Healthcare providers call the two conditions, DVT and PE, venous thromboembolism (VTE).  CVI cant be cured, but you can control leg swelling to reduce the likelihood of ulcers (sores).  Recognizing the symptoms  Be aware of the following:  · If you stand or sit with your feet down for long periods, your legs may ache or feel heavy.  · Swollen ankles are possibly the most common symptom of CVI.  · As swelling increases, the skin over your ankles may show red spots or a brownish tinge. The skin may feel leathery or scaly, and may start to itch.  · If swelling is not controlled, an ulcer (open wound) may form.  What you can do  Reduce your risk of developing ulcers by doing the following:  · Increase blood flow back to your heart by elevating your legs, exercising daily,  and wearing elastic stockings.  · Boost blood flow in your legs by losing excess weight.  · If you must stand or sit in one place for a period of time, keep your blood moving by wiggling your toes, shifting your body position, and rising up on the balls of your feet.    Date Last Reviewed: 5/1/2016 © 2000-2017 [employer]. [address] [place] [address], [place] [address]. All rights reserved. This information is not intended as a substitute for professional medical care. Always follow your healthcare professional's instructions.        Taking Aspirin for Atherosclerosis       Aspirin is a medicine often prescribed to treat atherosclerosis. This condition affects your arteries. These are the blood vessels that carry blood away from your heart. Having atherosclerosis means youre at greater risk of a heart attack or stroke. Aspirin can help prevent these from happening.  How atherosclerosis affects your arteries   A fatty material (plaque) can build up in your arteries. This makes it harder for blood to flow through them. A blood clot can then form on the plaque. This may block the artery, cutting off blood flow. This can cause conditions such as coronary artery disease (CAD) and peripheral arterial disease (PAD):  · CAD occurs when plaque builds up in the coronary artery. This artery supplies the heart with oxygen-rich blood.  · PAD occurs when plaque forms in leg arteries.  The same things that cause CAD and PAD can also cause plaque to form in other arteries in your body, such as those in the brain. When plaque occurs in any of these arteries, it raises your risk of heart attack or stroke.  What aspirin does  Aspirin is a blood-thinner (antiplatelet medicine). It helps keep blood clots from forming. This reduces the risk of blockage. Aspirin can be taken daily if you are at high risk of or have already had a heart attack or stroke. It is also used after a procedure called a stent placement. This is when a  tiny wire mesh tube, or stent, is placed in an artery to keep it open. Aspirin helps prevent blood clots from forming on the stent.  Taking aspirin safely  Tell your healthcare provider about any medicines you are taking. This includes:  · All prescription medicines  · Over-the-counter medicines  · Herbs, vitamins, and other supplements  Also mention if you have a history of ulcers or bleeding problems. Ask whether you will need to stop taking aspirin before having surgery or dental work. Always take medicines as directed.  Tips for taking aspirin  · Have a routine. For example, take aspirin with the same meal each day.  · Dont take more than prescribed. A low dose gives the same benefit as a higher one, with a lower risk of side effects.  · Dont skip doses. Aspirin needs to be taken each day to work well.  · Keep track of what you take. A pillbox with days of the week can help, especially if you take several medicines. Or use a list or chart to keep track.  When to call your healthcare provider  Side effects of aspirin are not usually serious. If you do have problems, changing your dose may help. Call your healthcare provider if you have any of the following:  · Excessive bruising (some bruising is normal)  · Nosebleeds, bleeding gums, or other excessive bleeding  · An upset stomach or stomach pain   Date Last Reviewed: 6/1/2016  © 3528-8193 [employer]. [address] [place] [address], [place] [address]. All rights reserved. This information is not intended as a substitute for professional medical care. Always follow your healthcare professional's instructions.

## 2018-06-05 NOTE — PROGRESS NOTES
Subjective:   Patient ID:  Domingo Gross Sr. is a 57 y.o. male who presents for follow up of Peripheral Arterial Disease; Hyperlipidemia; and Chronic Venous Insufficiency      HPI:     Domingo Gross Sr. 57 y.o. male is here follow up PAD with complete resolution of winsome IIb claudication after bilateral SFA revascularization R (3/2018) and L (4/2018)- PTA with 6.0 mm Lutonix DCB.  In 6/2017 he had L EIA PTAS for winsome IIb claudication. No ulcers. He is on aspirin and plavix for DAPT. Post bilateral SFA he complained of bilateral edema. No DVT was seen. It is concerning for venous insufficiency.      He has a h/o L SFA atherectomy with 2.2 Phoenix catheter with PTA using using 6.0 x 100 mm Lutonix DCB in 6/2015.  He had distal aorta iliac reconstruction in 6/2014 with 8.0 x 39 mm BES overlapping with 9.0 x 60 in R EIA and 8.0 x 80 mm SES in L EIA post dilated with 9.0 mm bilaterally.       ELISABETH with stress 5/2017:   R 1.01 to 0.44   L 0.92 to 0.45    After 6 minutes ambulation      CTA 5/2017:       Patent distal aorta and bilateral GRUPO/EIA stents   L EIA with de shawna 90% stenosis   L SFA 80% with 3 vessel run off     R EIA/CFA with moderate disease   R SFA 80% stenosis with 3 vessel run off        Peripheral angiogram with intervention 6/2017         Patent bilateral GRUPO/EIA stents.    De shawna 80% left EIA stenosis treated with 9.0 x 80 mm Lifestar  stent post dilated with 8.0 mm balloon.    Bilateral 70-80% SFA stenosis with 3 vessel run off.        3/2018      L SFA intervention for claudication   75% L SFA with 3 vessel run off   7 mmHg resting and 33 mmHg hyperemic mean gradient         L CFA antegrade access   PTA with 6.0 x 150 mm   PTA with 6.0 x 150 mm Lutonix   3 vessel run off           4/13/2018       S/p R SFA PTA for winsome IIb claudication   R CFA antegrade access         R CFA with 50% stenosis-heavily calcified lesion               Baseline /90         R SFA with 70% stenosis  with a significant resting and hyperemic mean gradient     3 vessel run off             PTA of R SFA with 6.0 x 150 + 6.0 x 60 mm Lutonix DCB        5/2/2018   Patent arteries post revascularization                         Patient Active Problem List    Diagnosis Date Noted    Atherosclerosis of native artery of right lower extremity with intermittent claudication 04/13/2018     Priority: High    Atherosclerosis of native artery of both lower extremities with intermittent claudication 03/02/2018     Priority: High    Venous insufficiency of both lower extremities 06/04/2018    Localized edema 06/04/2018    Left knee pain 01/19/2016    Pain of left lower extremity 01/19/2016    Difficulty walking 01/19/2016    Acute pain of left knee 12/11/2015    Hyperlipidemia LDL goal <70 06/12/2015    DJD (degenerative joint disease), lumbar 05/27/2015    Lumbar facet arthropathy 05/27/2015    DDD (degenerative disc disease) 05/27/2015    Spondylosis without myelopathy 05/27/2015    Limb pain 11/21/2014    History of back injury 11/21/2014     20 years ago a bag fell on his back at work      Myalgia 11/21/2014    Claudication in peripheral vascular disease 06/13/2014    PAD (peripheral artery disease) 05/21/2014 6/13/2014      1. Three vessel runoff below the knee bilaterally.  2. Severe disease of the terminal aorta.  3. Successful PTAS.  4. Bilateral common iliac reconstruction with 8 x 39 mm stent extending in the aorta  5. Right common illiac was treated with an overlapping 9 x 60 mm SES   6. Left common iliac was treated with an overlapping 8 x 80 mm SES down to external iliac  7. All stents were post dilated with 9.0 x 60 mm balloons  8. Moderate bilateral SFA disease  9. Chronically occluded left internal iliac artery        6/12/2015      1. 80% mid left SFA with a 20 mmHg resting mean gradient  2. Atherectomy with 2.2 Phonenix Volcano catheter  3. PTA with 6.0 x 100 mm Lutonix drug coated  balloon        2017      Patent bilateral GRUPO/EIA stents  L EIA PTAS 9.0 x 80 mm Life stent post dilated with 8.0 mm balloon  Bilateral 70-80% SFA stenosis with 3 vessel run off          3/2018      L SFA intervention for claudication   75% L SFA with 3 vessel run off   7 mmHg resting and 33 mmHg hyperemic mean gradient         L CFA antegrade access   PTA with 6.0 x 150 mm   PTA with 6.0 x 150 mm Lutonix   3 vessel run off           2018       S/p R SFA PTA for winsome IIb claudication   R CFA antegrade access         R CFA with 50% stenosis-heavily calcified lesion               Baseline /90         R SFA with 70% stenosis with a significant resting and hyperemic mean gradient     3 vessel run off             PTA of R SFA with 6.0 x 150 + 6.0 x 60 mm Lutonix DCB        2018   Patent arteries post revascularization                     Atherosclerosis of leg with intermittent claudication 2014     Winsome IIB      Elevated TSH 2013    HTN (hypertension) 2013    Elevated fasting blood sugar 2013           Right Arm BP - Sittin/71  Left Arm BP - Sittin/71          LAST HbA1c  Lab Results   Component Value Date    HGBA1C 5.8 2013       Lipid panel  Lab Results   Component Value Date    CHOL 183 10/07/2017    CHOL 166 2017    CHOL 118 (L) 2015     Lab Results   Component Value Date    HDL 51 10/07/2017    HDL 50 2017    HDL 41 2015     Lab Results   Component Value Date    LDLCALC 107.0 10/07/2017    LDLCALC 88.6 2017    LDLCALC 54.2 (L) 2015     Lab Results   Component Value Date    TRIG 125 10/07/2017    TRIG 137 2017    TRIG 114 2015     Lab Results   Component Value Date    CHOLHDL 27.9 10/07/2017    CHOLHDL 30.1 2017    CHOLHDL 34.7 2015            Review of Systems   Constitution: Negative for diaphoresis, weakness, night sweats, weight gain and weight loss.   HENT: Negative for  congestion.    Eyes: Negative for blurred vision, discharge and double vision.   Cardiovascular: Negative for chest pain, claudication, cyanosis, dyspnea on exertion, irregular heartbeat, leg swelling, near-syncope, orthopnea, palpitations, paroxysmal nocturnal dyspnea and syncope.   Respiratory: Negative for cough, shortness of breath and wheezing.    Endocrine: Negative for cold intolerance, heat intolerance and polyphagia.   Hematologic/Lymphatic: Negative for adenopathy and bleeding problem. Does not bruise/bleed easily.   Skin: Negative for dry skin and nail changes.   Musculoskeletal: Negative for arthritis, back pain, falls, joint pain, myalgias and neck pain.   Gastrointestinal: Negative for bloating, abdominal pain, change in bowel habit and constipation.   Genitourinary: Negative for bladder incontinence, dysuria, flank pain, genital sores and missed menses.   Neurological: Negative for aphonia, brief paralysis, difficulty with concentration and dizziness.   Psychiatric/Behavioral: Negative for altered mental status and memory loss. The patient does not have insomnia.    Allergic/Immunologic: Negative for environmental allergies.       Objective:   Physical Exam   Constitutional: He is oriented to person, place, and time. He appears well-developed and well-nourished. He is not intubated.   HENT:   Head: Normocephalic and atraumatic.   Right Ear: External ear normal.   Left Ear: External ear normal.   Mouth/Throat: Oropharynx is clear and moist.   Eyes: Conjunctivae and EOM are normal. Pupils are equal, round, and reactive to light. Right eye exhibits no discharge. Left eye exhibits no discharge. No scleral icterus.   Neck: Normal range of motion. Neck supple. Normal carotid pulses, no hepatojugular reflux and no JVD present. Carotid bruit is not present. No tracheal deviation present. No thyromegaly present.   Cardiovascular: Normal rate, regular rhythm, S1 normal and S2 normal.   No extrasystoles are  present. PMI is not displaced.  Exam reveals no gallop, no S3, no distant heart sounds, no friction rub and no midsystolic click.    No murmur heard.  Pulses:       Carotid pulses are 2+ on the right side, and 2+ on the left side.       Radial pulses are 2+ on the right side, and 2+ on the left side.        Femoral pulses are 2+ on the right side, and 2+ on the left side.       Popliteal pulses are 2+ on the right side, and 2+ on the left side.        Dorsalis pedis pulses are 2+ on the right side, and 2+ on the left side.        Posterior tibial pulses are 2+ on the right side, and 2+ on the left side.     Biphasic R DP and PT doppler signals      Monphasic L DP and PT doppler signals                     Pulmonary/Chest: Effort normal and breath sounds normal. No accessory muscle usage or stridor. No apnea, no tachypnea and no bradypnea. He is not intubated. No respiratory distress. He has no decreased breath sounds. He has no wheezes. He has no rales. He exhibits no tenderness and no bony tenderness.   Abdominal: He exhibits no distension, no pulsatile liver, no abdominal bruit, no ascites, no pulsatile midline mass and no mass. There is no tenderness. There is no rebound and no guarding.   Musculoskeletal: Normal range of motion. He exhibits no edema or tenderness.   Lymphadenopathy:     He has no cervical adenopathy.   Neurological: He is alert and oriented to person, place, and time. He has normal reflexes. No cranial nerve deficit. Coordination normal.   Skin: Skin is warm. No rash noted. No erythema. No pallor.   Psychiatric: He has a normal mood and affect. His behavior is normal. Judgment and thought content normal.       Assessment:     1. Venous insufficiency of both lower extremities    2. PAD (peripheral artery disease)    3. Hyperlipidemia LDL goal <70    4. Essential hypertension    5. Localized edema        Plan:         Medical therapy for  PAD   Aspirin   Plavix   Statin   Pletal   Arb        Compression stockings   20-30 mmHg   Venous insufficiency     If edema persists he will have a venous reflux ultrasound             Continue with current medical plan and lifestyle changes.  Return sooner for concerns or questions. If symptoms persist go to the ED  I have reviewed all pertinent data on this patient       I have reviewed the patient's medical history in detail and updated the computerized patient record.    Orders Placed This Encounter   Procedures    COMPRESSION STOCKINGS     Knee high     Order Specific Question:   Pressure amount:     Answer:   20-30 mmHg       Follow up as scheduled. Return sooner for concerns or questions            He expressed verbal understanding and agreed with the plan        Greater than 50% of the visit of 45 minutes was spent counseling, educating, and coordinating the care of the patient.          Patient's Medications   New Prescriptions    No medications on file   Previous Medications    ASPIRIN (ASPIR-81 ORAL)    Take 1 tablet by mouth.    ATORVASTATIN (LIPITOR) 40 MG TABLET    Take 1 tablet (40 mg total) by mouth once daily.    CILOSTAZOL (PLETAL) 100 MG TAB    Take 1 tablet (100 mg total) by mouth 2 (two) times daily.    CLOPIDOGREL (PLAVIX) 75 MG TABLET    Take 1 tablet (75 mg total) by mouth once daily.    COENZYME Q10 (COQ-10) 100 MG CAPSULE    Take 1 capsule (100 mg total) by mouth once daily.    HYDROCHLOROTHIAZIDE (HYDRODIURIL) 25 MG TABLET    TAKE 1 TABLET(25 MG) BY MOUTH EVERY DAY    LOSARTAN (COZAAR) 100 MG TABLET    TAKE 1 TABLET(100 MG) BY MOUTH EVERY DAY    METOPROLOL SUCCINATE (TOPROL-XL) 25 MG 24 HR TABLET    TAKE 1 TABLET(25 MG) BY MOUTH EVERY DAY   Modified Medications    No medications on file   Discontinued Medications    No medications on file

## 2018-07-24 ENCOUNTER — PATIENT MESSAGE (OUTPATIENT)
Dept: CARDIOLOGY | Facility: CLINIC | Age: 58
End: 2018-07-24

## 2018-07-24 DIAGNOSIS — I73.9 PAD (PERIPHERAL ARTERY DISEASE): ICD-10-CM

## 2018-07-24 RX ORDER — CILOSTAZOL 100 MG/1
100 TABLET ORAL 2 TIMES DAILY
Qty: 180 TABLET | Refills: 3 | Status: SHIPPED | OUTPATIENT
Start: 2018-07-24 | End: 2019-06-18 | Stop reason: SDUPTHER

## 2018-07-24 RX ORDER — CLOPIDOGREL BISULFATE 75 MG/1
75 TABLET ORAL DAILY
Qty: 90 TABLET | Refills: 4 | Status: SHIPPED | OUTPATIENT
Start: 2018-07-24 | End: 2019-01-21 | Stop reason: SDUPTHER

## 2018-07-25 ENCOUNTER — PATIENT MESSAGE (OUTPATIENT)
Dept: ADMINISTRATIVE | Facility: HOSPITAL | Age: 58
End: 2018-07-25

## 2018-08-03 DIAGNOSIS — I10 ESSENTIAL HYPERTENSION: ICD-10-CM

## 2018-08-07 RX ORDER — METOPROLOL SUCCINATE 25 MG/1
TABLET, EXTENDED RELEASE ORAL
Qty: 90 TABLET | Refills: 0 | Status: SHIPPED | OUTPATIENT
Start: 2018-08-07 | End: 2018-10-31 | Stop reason: SDUPTHER

## 2018-09-06 ENCOUNTER — TELEPHONE (OUTPATIENT)
Dept: ADMINISTRATIVE | Facility: HOSPITAL | Age: 58
End: 2018-09-06

## 2018-09-06 NOTE — TELEPHONE ENCOUNTER
Call placed regarding health maintenance -colorectal screening kit- left message to contact office

## 2018-10-30 ENCOUNTER — TELEPHONE (OUTPATIENT)
Dept: CARDIOLOGY | Facility: HOSPITAL | Age: 58
End: 2018-10-30

## 2018-10-30 ENCOUNTER — PATIENT MESSAGE (OUTPATIENT)
Dept: CARDIOLOGY | Facility: CLINIC | Age: 58
End: 2018-10-30

## 2018-10-30 DIAGNOSIS — I73.9 PAD (PERIPHERAL ARTERY DISEASE): Primary | ICD-10-CM

## 2018-10-31 DIAGNOSIS — I10 ESSENTIAL HYPERTENSION: ICD-10-CM

## 2018-11-01 RX ORDER — METOPROLOL SUCCINATE 25 MG/1
TABLET, EXTENDED RELEASE ORAL
Qty: 90 TABLET | Refills: 0 | Status: SHIPPED | OUTPATIENT
Start: 2018-11-01 | End: 2018-11-06 | Stop reason: SDUPTHER

## 2018-11-05 ENCOUNTER — HOSPITAL ENCOUNTER (OUTPATIENT)
Dept: CARDIOLOGY | Facility: HOSPITAL | Age: 58
Discharge: HOME | End: 2018-11-05
Attending: INTERNAL MEDICINE
Payer: COMMERCIAL

## 2018-11-05 ENCOUNTER — HOSPITAL ENCOUNTER (OUTPATIENT)
Dept: RADIOLOGY | Facility: HOSPITAL | Age: 58
Discharge: HOME | End: 2018-11-05
Attending: INTERNAL MEDICINE
Payer: COMMERCIAL

## 2018-11-05 DIAGNOSIS — I73.9 PAD (PERIPHERAL ARTERY DISEASE): ICD-10-CM

## 2018-11-05 PROCEDURE — 93924 LWR XTR VASC STDY BILAT: CPT | Mod: 26,,, | Performed by: INTERNAL MEDICINE

## 2018-11-05 PROCEDURE — 93925 LOWER EXTREMITY STUDY: CPT | Mod: TC

## 2018-11-05 PROCEDURE — 93925 LOWER EXTREMITY STUDY: CPT | Mod: 26,,, | Performed by: RADIOLOGY

## 2018-11-05 PROCEDURE — 93924 LWR XTR VASC STDY BILAT: CPT

## 2018-11-06 DIAGNOSIS — I10 ESSENTIAL HYPERTENSION: ICD-10-CM

## 2018-11-06 LAB
IMMEDIATE ARM BP: 141 MMHG
IMMEDIATE LEFT ABI: 1.04
IMMEDIATE LEFT TIBIAL: 147 MMHG
IMMEDIATE RIGHT ABI: 1.02
IMMEDIATE RIGHT TIBIAL: 144 MMHG
LEFT ABI: 1.08
LEFT ARM BP: 138 MMHG
LEFT DORSALIS PEDIS: 150 MMHG
LEFT POSTERIOR TIBIAL: 145 MMHG
RIGHT ABI: 1.04
RIGHT ARM BP: 139 MMHG
RIGHT DORSALIS PEDIS: 144 MMHG
RIGHT POSTERIOR TIBIAL: 143 MMHG
TREADMILL GRADE: 10 %
TREADMILL SPEED: 2 MPH
TREADMILL TIME: 5 MIN

## 2018-11-06 RX ORDER — METOPROLOL SUCCINATE 25 MG/1
TABLET, EXTENDED RELEASE ORAL
Qty: 90 TABLET | Refills: 1 | Status: SHIPPED | OUTPATIENT
Start: 2018-11-06 | End: 2019-06-26 | Stop reason: SDUPTHER

## 2018-11-07 ENCOUNTER — PATIENT MESSAGE (OUTPATIENT)
Dept: CARDIOLOGY | Facility: CLINIC | Age: 58
End: 2018-11-07

## 2019-01-21 RX ORDER — CLOPIDOGREL BISULFATE 75 MG/1
75 TABLET ORAL DAILY
Qty: 90 TABLET | Refills: 0 | Status: SHIPPED | OUTPATIENT
Start: 2019-01-21 | End: 2019-09-16 | Stop reason: SDUPTHER

## 2019-02-08 ENCOUNTER — PATIENT MESSAGE (OUTPATIENT)
Dept: CARDIOLOGY | Facility: CLINIC | Age: 59
End: 2019-02-08

## 2019-02-12 ENCOUNTER — TELEPHONE (OUTPATIENT)
Dept: CARDIOLOGY | Facility: CLINIC | Age: 59
End: 2019-02-12

## 2019-02-12 ENCOUNTER — TELEPHONE (OUTPATIENT)
Dept: CARDIOLOGY | Facility: HOSPITAL | Age: 59
End: 2019-02-12

## 2019-02-12 ENCOUNTER — PATIENT MESSAGE (OUTPATIENT)
Dept: CARDIOLOGY | Facility: CLINIC | Age: 59
End: 2019-02-12

## 2019-02-12 DIAGNOSIS — R07.9 ACUTE CHEST PAIN: ICD-10-CM

## 2019-02-12 DIAGNOSIS — R07.9 CHEST PAIN, UNSPECIFIED TYPE: Primary | ICD-10-CM

## 2019-02-12 DIAGNOSIS — I70.211 ATHEROSCLEROSIS OF NATIVE ARTERY OF RIGHT LOWER EXTREMITY WITH INTERMITTENT CLAUDICATION: Primary | ICD-10-CM

## 2019-02-12 DIAGNOSIS — R07.9 CHEST PAIN, UNSPECIFIED TYPE: ICD-10-CM

## 2019-02-12 NOTE — TELEPHONE ENCOUNTER
Please SDMP      Order stress test + echo + ABIX      Immediate follow up after tests      Thanks      ZN

## 2019-02-19 ENCOUNTER — HOSPITAL ENCOUNTER (OUTPATIENT)
Dept: CARDIOLOGY | Facility: HOSPITAL | Age: 59
Discharge: HOME | End: 2019-02-19
Attending: INTERNAL MEDICINE
Payer: COMMERCIAL

## 2019-02-19 ENCOUNTER — HOSPITAL ENCOUNTER (OUTPATIENT)
Dept: RADIOLOGY | Facility: HOSPITAL | Age: 59
Discharge: HOME | End: 2019-02-19
Attending: INTERNAL MEDICINE
Payer: COMMERCIAL

## 2019-02-19 DIAGNOSIS — R07.9 CHEST PAIN, UNSPECIFIED TYPE: ICD-10-CM

## 2019-02-19 DIAGNOSIS — R07.9 ACUTE CHEST PAIN: ICD-10-CM

## 2019-02-19 DIAGNOSIS — I70.211 ATHEROSCLEROSIS OF NATIVE ARTERY OF RIGHT LOWER EXTREMITY WITH INTERMITTENT CLAUDICATION: ICD-10-CM

## 2019-02-19 LAB
AORTIC ROOT ANNULUS: 3.99 CM
AV INDEX (PROSTH): 0.79
AV MEAN GRADIENT: 5.03 MMHG
AV PEAK GRADIENT: 9.49 MMHG
AV VALVE AREA: 3.03 CM2
AV VELOCITY RATIO: 0.87
CV ECHO LV RWT: 0.42 CM
CV STRESS BASE HR: 75 BPM
DIASTOLIC BLOOD PRESSURE: 92 MMHG
DOP CALC AO PEAK VEL: 1.54 M/S
DOP CALC AO VTI: 34.87 CM
DOP CALC LVOT AREA: 3.83 CM2
DOP CALC LVOT DIAMETER: 2.21 CM
DOP CALC LVOT PEAK VEL: 1.34 M/S
DOP CALC LVOT STROKE VOLUME: 105.7 CM3
DOP CALCLVOT PEAK VEL VTI: 27.57 CM
E WAVE DECELERATION TIME: 266.14 MSEC
E/A RATIO: 1.44
ECHO LV POSTERIOR WALL: 0.92 CM (ref 0.6–1.1)
FRACTIONAL SHORTENING: 32 % (ref 28–44)
IMMEDIATE ARM BP: 144 MMHG
IMMEDIATE LEFT ABI: 0.66
IMMEDIATE LEFT TIBIAL: 95 MMHG
IMMEDIATE RIGHT ABI: 0.27
IMMEDIATE RIGHT TIBIAL: 39 MMHG
INTERVENTRICULAR SEPTUM: 0.86 CM (ref 0.6–1.1)
LA MAJOR: 3.9 CM
LA MINOR: 3.77 CM
LA WIDTH: 2.83 CM
LEFT ABI: 0.9
LEFT ARM BP: 159 MMHG
LEFT ATRIUM SIZE: 3.09 CM
LEFT ATRIUM VOLUME: 28.5 CM3
LEFT DORSALIS PEDIS: 141 MMHG
LEFT INTERNAL DIMENSION IN SYSTOLE: 2.98 CM (ref 2.1–4)
LEFT POSTERIOR TIBIAL: 143 MMHG
LEFT VENTRICLE DIASTOLIC VOLUME: 88.12 ML
LEFT VENTRICLE SYSTOLIC VOLUME: 34.53 ML
LEFT VENTRICULAR INTERNAL DIMENSION IN DIASTOLE: 4.41 CM (ref 3.5–6)
LEFT VENTRICULAR MASS: 126.57 G
LV LATERAL E/E' RATIO: 7.58
MV PEAK A VEL: 0.63 M/S
MV PEAK E VEL: 0.91 M/S
OHS CV CPX 1 MINUTE RECOVERY HEART RATE: 127 BPM
OHS CV CPX 85 PERCENT MAX PREDICTED HEART RATE MALE: 139
OHS CV CPX ESTIMATED METS: 7
OHS CV CPX MAX PREDICTED HEART RATE: 163
OHS CV CPX PATIENT IS FEMALE: 0
OHS CV CPX PATIENT IS MALE: 1
OHS CV CPX PEAK DIASTOLIC BLOOD PRESSURE: 86 MMHG
OHS CV CPX PEAK HEAR RATE: 146 BPM
OHS CV CPX PEAK RATE PRESSURE PRODUCT: NORMAL
OHS CV CPX PEAK SYSTOLIC BLOOD PRESSURE: 196 MMHG
OHS CV CPX PERCENT MAX PREDICTED HEART RATE ACHIEVED: 90
OHS CV CPX PERCENT TARGET HEART RATE ACHIEVED: 105.8
OHS CV CPX RATE PRESSURE PRODUCT PRESENTING: NORMAL
OHS CV CPX TARGET HEART RATE: 138
PISA TR MAX VEL: 2.43 M/S
POST1 ARM BP: 144 MMHG
POST1 LEFT ABI: 0.65
POST1 LEFT TIBIAL: 93 MMHG
POST1 RIGHT ABI: 0.35
POST1 RIGHT TIBIAL: 50 MMHG
POST2 ARM BP: 144 MMHG
POST2 LEFT ABI: 0.66
POST2 LEFT TIBIAL: 95 MMHG
POST2 RIGHT ABI: 0.41
POST2 RIGHT TIBIAL: 59 MMHG
PULM VEIN S/D RATIO: 1.17
PV PEAK D VEL: 0.58 M/S
PV PEAK S VEL: 0.68 M/S
PV PEAK VELOCITY: 1.08 CM/S
RA MAJOR: 3.24 CM
RA PRESSURE: 3 MMHG
RIGHT ABI: 0.84
RIGHT ARM BP: 146 MMHG
RIGHT DORSALIS PEDIS: 134 MMHG
RIGHT POSTERIOR TIBIAL: 118 MMHG
RIGHT VENTRICULAR END-DIASTOLIC DIMENSION: 2.89 CM
STRESS ECHO POST EXERCISE DUR MIN: 6 MIN
STRESS ECHO POST EXERCISE DUR SEC: 0
STRESS ST DEPRESSION: 2 MM
SYSTOLIC BLOOD PRESSURE: 150 MMHG
TDI LATERAL: 0.12
TR MAX PG: 23.62 MMHG
TREADMILL GRADE: 10 %
TREADMILL SPEED: 2 MPH
TREADMILL TIME: 5 MIN
TV REST PULMONARY ARTERY PRESSURE: 27 MMHG

## 2019-02-19 PROCEDURE — 93016 CV STRESS TEST SUPVJ ONLY: CPT | Mod: ,,, | Performed by: INTERNAL MEDICINE

## 2019-02-19 PROCEDURE — 78452 NM MYOCARDIAL PERFUSION SPECT MULTI STUDY: ICD-10-PCS | Mod: 26,,, | Performed by: RADIOLOGY

## 2019-02-19 PROCEDURE — 78452 HT MUSCLE IMAGE SPECT MULT: CPT | Mod: 26,,, | Performed by: RADIOLOGY

## 2019-02-19 PROCEDURE — 93018 TREADMILL STRESS TEST (CUPID ONLY): ICD-10-PCS | Mod: ,,, | Performed by: INTERNAL MEDICINE

## 2019-02-19 PROCEDURE — 93924 CV US ANKLE / BRACHIAL INDICES W/ EXERCISE STRESS (CUPID ONLY): ICD-10-PCS | Mod: 26,,, | Performed by: INTERNAL MEDICINE

## 2019-02-19 PROCEDURE — 93017 CV STRESS TEST TRACING ONLY: CPT

## 2019-02-19 PROCEDURE — 93016 TREADMILL STRESS TEST (CUPID ONLY): ICD-10-PCS | Mod: ,,, | Performed by: INTERNAL MEDICINE

## 2019-02-19 PROCEDURE — A9502 TC99M TETROFOSMIN: HCPCS

## 2019-02-19 PROCEDURE — 93018 CV STRESS TEST I&R ONLY: CPT | Mod: ,,, | Performed by: INTERNAL MEDICINE

## 2019-02-19 PROCEDURE — 93306 TTE W/DOPPLER COMPLETE: CPT | Mod: 26,,, | Performed by: INTERNAL MEDICINE

## 2019-02-19 PROCEDURE — 93924 LWR XTR VASC STDY BILAT: CPT | Mod: 50

## 2019-02-19 PROCEDURE — 93306 TTE W/DOPPLER COMPLETE: CPT

## 2019-02-19 PROCEDURE — 93924 LWR XTR VASC STDY BILAT: CPT | Mod: 26,,, | Performed by: INTERNAL MEDICINE

## 2019-02-19 PROCEDURE — 93306 TRANSTHORACIC ECHO (TTE) COMPLETE (CUPID ONLY): ICD-10-PCS | Mod: 26,,, | Performed by: INTERNAL MEDICINE

## 2019-02-21 ENCOUNTER — PATIENT MESSAGE (OUTPATIENT)
Dept: CARDIOLOGY | Facility: CLINIC | Age: 59
End: 2019-02-21

## 2019-02-22 ENCOUNTER — PATIENT MESSAGE (OUTPATIENT)
Dept: INTERNAL MEDICINE | Facility: CLINIC | Age: 59
End: 2019-02-22

## 2019-02-22 NOTE — PROGRESS NOTES
Your ankle brachial index revealed some changes that could explain right leg pain        We might have to place a stent time for the stenosis      Thanks        ZN

## 2019-02-27 ENCOUNTER — PATIENT MESSAGE (OUTPATIENT)
Dept: ADMINISTRATIVE | Facility: OTHER | Age: 59
End: 2019-02-27

## 2019-02-27 ENCOUNTER — RESEARCH ENCOUNTER (OUTPATIENT)
Dept: CARDIOLOGY | Facility: CLINIC | Age: 59
End: 2019-02-27

## 2019-02-27 ENCOUNTER — RESEARCH ENCOUNTER (OUTPATIENT)
Dept: RESEARCH | Facility: HOSPITAL | Age: 59
End: 2019-02-27

## 2019-02-27 ENCOUNTER — OFFICE VISIT (OUTPATIENT)
Dept: CARDIOLOGY | Facility: CLINIC | Age: 59
End: 2019-02-27
Payer: COMMERCIAL

## 2019-02-27 VITALS
WEIGHT: 181 LBS | BODY MASS INDEX: 26.81 KG/M2 | SYSTOLIC BLOOD PRESSURE: 120 MMHG | HEIGHT: 69 IN | DIASTOLIC BLOOD PRESSURE: 78 MMHG | HEART RATE: 72 BPM

## 2019-02-27 DIAGNOSIS — I73.9 PAD (PERIPHERAL ARTERY DISEASE): ICD-10-CM

## 2019-02-27 DIAGNOSIS — I10 ESSENTIAL HYPERTENSION: ICD-10-CM

## 2019-02-27 DIAGNOSIS — R94.39 ABNORMAL CARDIOVASCULAR STRESS TEST: Primary | ICD-10-CM

## 2019-02-27 DIAGNOSIS — Z00.6 RESEARCH SUBJECT: Primary | ICD-10-CM

## 2019-02-27 DIAGNOSIS — I20.9 ANGINA, CLASS II: ICD-10-CM

## 2019-02-27 DIAGNOSIS — I70.211 ATHEROSCLEROSIS OF NATIVE ARTERY OF RIGHT LOWER EXTREMITY WITH INTERMITTENT CLAUDICATION: ICD-10-CM

## 2019-02-27 DIAGNOSIS — E78.5 HYPERLIPIDEMIA LDL GOAL <70: ICD-10-CM

## 2019-02-27 PROCEDURE — 3078F DIAST BP <80 MM HG: CPT | Mod: CPTII,S$GLB,, | Performed by: INTERNAL MEDICINE

## 2019-02-27 PROCEDURE — 99999 PR PBB SHADOW E&M-EST. PATIENT-LVL III: ICD-10-PCS | Mod: PBBFAC,,, | Performed by: INTERNAL MEDICINE

## 2019-02-27 PROCEDURE — 3008F BODY MASS INDEX DOCD: CPT | Mod: CPTII,S$GLB,, | Performed by: INTERNAL MEDICINE

## 2019-02-27 PROCEDURE — 3008F PR BODY MASS INDEX (BMI) DOCUMENTED: ICD-10-PCS | Mod: CPTII,S$GLB,, | Performed by: INTERNAL MEDICINE

## 2019-02-27 PROCEDURE — 99999 PR PBB SHADOW E&M-EST. PATIENT-LVL III: CPT | Mod: PBBFAC,,, | Performed by: INTERNAL MEDICINE

## 2019-02-27 PROCEDURE — 99215 OFFICE O/P EST HI 40 MIN: CPT | Mod: S$GLB,,, | Performed by: INTERNAL MEDICINE

## 2019-02-27 PROCEDURE — 3078F PR MOST RECENT DIASTOLIC BLOOD PRESSURE < 80 MM HG: ICD-10-PCS | Mod: CPTII,S$GLB,, | Performed by: INTERNAL MEDICINE

## 2019-02-27 PROCEDURE — 99215 PR OFFICE/OUTPT VISIT, EST, LEVL V, 40-54 MIN: ICD-10-PCS | Mod: S$GLB,,, | Performed by: INTERNAL MEDICINE

## 2019-02-27 PROCEDURE — 3074F PR MOST RECENT SYSTOLIC BLOOD PRESSURE < 130 MM HG: ICD-10-PCS | Mod: CPTII,S$GLB,, | Performed by: INTERNAL MEDICINE

## 2019-02-27 PROCEDURE — 3074F SYST BP LT 130 MM HG: CPT | Mod: CPTII,S$GLB,, | Performed by: INTERNAL MEDICINE

## 2019-02-27 RX ORDER — DIPHENHYDRAMINE HCL 25 MG
50 CAPSULE ORAL ONCE
Status: CANCELLED | OUTPATIENT
Start: 2019-02-27 | End: 2019-02-27

## 2019-02-27 RX ORDER — SODIUM CHLORIDE 9 MG/ML
INJECTION, SOLUTION INTRAVENOUS CONTINUOUS
Status: CANCELLED | OUTPATIENT
Start: 2019-02-27

## 2019-02-27 NOTE — PROGRESS NOTES
Subjective:   Patient ID:  Domingo Gross Sr. is a 58 y.o. male who presents for follow up of Peripheral Arterial Disease; Claudication; Hyperlipidemia; Hypertension; and Abnormal Stress Test      HPI:     Domingo Gross Sr. 58 y.o. male is here follow up PAD with recurrent winsome IIb claudication after bilateral SFA revascularization R (3/2018) and L (4/2018)- PTA with 6.0 mm Lutonix DCB.  In 6/2017 he had L EIA PTAS for winsome IIb claudication. No ulcers. He is on aspirin and plavix for DAPT. He has R calf claudication. No ulcers. He is also complaining of chest pain at rest but never with exertion. Stress test was abnormal with significant ST depression in inferior latera leads without WMAs.     He has a h/o L SFA atherectomy with 2.2 [place] catheter with PTA using using 6.0 x 100 mm Lutonix DCB in 6/2015.  He had distal aorta iliac reconstruction in 6/2014 with 8.0 x 39 mm BES overlapping with 9.0 x 60 in R EIA and 8.0 x 80 mm SES in L EIA post dilated with 9.0 mm bilaterally.       ELISABETH with stress 5/2017:   R 1.01 to 0.44   L 0.92 to 0.45    After 6 minutes ambulation      CTA 5/2017:       Patent distal aorta and bilateral GRUPO/EIA stents   L EIA with de shawna 90% stenosis   L SFA 80% with 3 vessel run off     R EIA/CFA with moderate disease   R SFA 80% stenosis with 3 vessel run off        Peripheral angiogram with intervention 6/2017         Patent bilateral GRUPO/EIA stents.    De shawna 80% left EIA stenosis treated with 9.0 x 80 mm Lifestar  stent post dilated with 8.0 mm balloon.    Bilateral 70-80% SFA stenosis with 3 vessel run off.        3/2018      L SFA intervention for claudication   75% L SFA with 3 vessel run off   7 mmHg resting and 33 mmHg hyperemic mean gradient         L CFA antegrade access   PTA with 6.0 x 150 mm   PTA with 6.0 x 150 mm Lutonix   3 vessel run off           4/13/2018       S/p R SFA PTA for winsome IIb claudication   R CFA antegrade access         R CFA with 50%  stenosis-heavily calcified lesion               Baseline /90         R SFA with 70% stenosis with a significant resting and hyperemic mean gradient     3 vessel run off             PTA of R SFA with 6.0 x 150 + 6.0 x 60 mm Lutonix DCB        5/2/2018   Patent arteries post revascularization        2/2019     R 0.84 to 0.27   L 0.90 to 0.66   After ambulation   Severe R calf claudication          Nuclear stress test 2/2019     + ECG with 2 mm ST depression   No wall motion abnormalities   Test stopped at 6 minutes, 7 METs, 86% predicted heart rate   Stopped because of R leg claudication + ECG changes                               Patient Active Problem List    Diagnosis Date Noted    Atherosclerosis of native artery of right lower extremity with intermittent claudication 04/13/2018     Priority: High    Atherosclerosis of native artery of both lower extremities with intermittent claudication 03/02/2018     Priority: High    Abnormal cardiovascular stress test 02/27/2019         Nuclear stress test 2/2019     + ECG with 2 mm ST depression   No wall motion abnormalities   Test stopped at 6 minutes, 7 METs, 86% predicted heart rate   Stopped because of R leg claudication + ECG changes            Venous insufficiency of both lower extremities 06/04/2018    Localized edema 06/04/2018    Left knee pain 01/19/2016    Pain of left lower extremity 01/19/2016    Difficulty walking 01/19/2016    Acute pain of left knee 12/11/2015    Hyperlipidemia LDL goal <70 06/12/2015    DJD (degenerative joint disease), lumbar 05/27/2015    Lumbar facet arthropathy 05/27/2015    DDD (degenerative disc disease) 05/27/2015    Spondylosis without myelopathy 05/27/2015    Limb pain 11/21/2014    History of back injury 11/21/2014     20 years ago a bag fell on his back at work      Myalgia 11/21/2014    Claudication in peripheral vascular disease 06/13/2014    PAD (peripheral artery disease) 05/21/2014      2014      1. Three vessel runoff below the knee bilaterally.  2. Severe disease of the terminal aorta.  3. Successful PTAS.  4. Bilateral common iliac reconstruction with 8 x 39 mm stent extending in the aorta  5. Right common illiac was treated with an overlapping 9 x 60 mm SES   6. Left common iliac was treated with an overlapping 8 x 80 mm SES down to external iliac  7. All stents were post dilated with 9.0 x 60 mm balloons  8. Moderate bilateral SFA disease  9. Chronically occluded left internal iliac artery        2015      1. 80% mid left SFA with a 20 mmHg resting mean gradient  2. Atherectomy with 2.2 [employer] [place] catheter  3. PTA with 6.0 x 100 mm Lutonix drug coated balloon        2017      Patent bilateral GRUPO/EIA stents  L EIA PTAS 9.0 x 80 mm Life stent post dilated with 8.0 mm balloon  Bilateral 70-80% SFA stenosis with 3 vessel run off          3/2018      L SFA intervention for claudication   75% L SFA with 3 vessel run off   7 mmHg resting and 33 mmHg hyperemic mean gradient         L CFA antegrade access   PTA with 6.0 x 150 mm   PTA with 6.0 x 150 mm Lutonix   3 vessel run off           2018       S/p R SFA PTA for winsome IIb claudication   R CFA antegrade access         R CFA with 50% stenosis-heavily calcified lesion               Baseline /90         R SFA with 70% stenosis with a significant resting and hyperemic mean gradient     3 vessel run off             PTA of R SFA with 6.0 x 150 + 6.0 x 60 mm Lutonix DCB        2018   Patent arteries post revascularization        2019     R 0.84 to 0.27   L 0.90 to 0.66   After ambulation   Severe R calf claudication                           Atherosclerosis of leg with intermittent claudication 2014     Winsome IIB      Elevated TSH 2013    HTN (hypertension) 2013    Elevated fasting blood sugar 2013           Right Arm BP - Sittin/78  Left Arm BP - Sittin/78          LAST  HbA1c  Lab Results   Component Value Date    HGBA1C 5.8 04/30/2013       Lipid panel  Lab Results   Component Value Date    CHOL 183 10/07/2017    CHOL 166 01/07/2017    CHOL 118 (L) 06/12/2015     Lab Results   Component Value Date    HDL 51 10/07/2017    HDL 50 01/07/2017    HDL 41 06/12/2015     Lab Results   Component Value Date    LDLCALC 107.0 10/07/2017    LDLCALC 88.6 01/07/2017    LDLCALC 54.2 (L) 06/12/2015     Lab Results   Component Value Date    TRIG 125 10/07/2017    TRIG 137 01/07/2017    TRIG 114 06/12/2015     Lab Results   Component Value Date    CHOLHDL 27.9 10/07/2017    CHOLHDL 30.1 01/07/2017    CHOLHDL 34.7 06/12/2015            Review of Systems   Constitution: Negative for diaphoresis, weakness, night sweats, weight gain and weight loss.   HENT: Negative for congestion.    Eyes: Negative for blurred vision, discharge and double vision.   Cardiovascular: Negative for chest pain, claudication, cyanosis, dyspnea on exertion, irregular heartbeat, leg swelling, near-syncope, orthopnea, palpitations, paroxysmal nocturnal dyspnea and syncope.   Respiratory: Negative for cough, shortness of breath and wheezing.    Endocrine: Negative for cold intolerance, heat intolerance and polyphagia.   Hematologic/Lymphatic: Negative for adenopathy and bleeding problem. Does not bruise/bleed easily.   Skin: Negative for dry skin and nail changes.   Musculoskeletal: Negative for arthritis, back pain, falls, joint pain, myalgias and neck pain.   Gastrointestinal: Negative for bloating, abdominal pain, change in bowel habit and constipation.   Genitourinary: Negative for bladder incontinence, dysuria, flank pain, genital sores and missed menses.   Neurological: Negative for aphonia, brief paralysis, difficulty with concentration and dizziness.   Psychiatric/Behavioral: Negative for altered mental status and memory loss. The patient does not have insomnia.    Allergic/Immunologic: Negative for environmental  allergies.       Objective:   Physical Exam   Constitutional: He is oriented to person, place, and time. He appears well-developed and well-nourished. He is not intubated.   HENT:   Head: Normocephalic and atraumatic.   Right Ear: External ear normal.   Left Ear: External ear normal.   Mouth/Throat: Oropharynx is clear and moist.   Eyes: Conjunctivae and EOM are normal. Pupils are equal, round, and reactive to light. Right eye exhibits no discharge. Left eye exhibits no discharge. No scleral icterus.   Neck: Normal range of motion. Neck supple. Normal carotid pulses, no hepatojugular reflux and no JVD present. Carotid bruit is not present. No tracheal deviation present. No thyromegaly present.   Cardiovascular: Normal rate, regular rhythm, S1 normal and S2 normal.  No extrasystoles are present. PMI is not displaced. Exam reveals no gallop, no S3, no distant heart sounds, no friction rub and no midsystolic click.   No murmur heard.  Pulses:       Carotid pulses are 2+ on the right side, and 2+ on the left side.       Radial pulses are 2+ on the right side, and 2+ on the left side.        Femoral pulses are 2+ on the right side, and 2+ on the left side.       Popliteal pulses are 2+ on the right side, and 2+ on the left side.        Dorsalis pedis pulses are 1+ on the right side, and 2+ on the left side.        Posterior tibial pulses are 1+ on the right side, and 2+ on the left side.     Biphasic R DP and PT doppler signals      Triphasic L DP and PT doppler signals                     Pulmonary/Chest: Effort normal and breath sounds normal. No accessory muscle usage or stridor. No apnea, no tachypnea and no bradypnea. He is not intubated. No respiratory distress. He has no decreased breath sounds. He has no wheezes. He has no rales. He exhibits no tenderness and no bony tenderness.   Abdominal: He exhibits no distension, no pulsatile liver, no abdominal bruit, no ascites, no pulsatile midline mass and no mass. There  is no tenderness. There is no rebound and no guarding.   Musculoskeletal: Normal range of motion. He exhibits no edema or tenderness.   Lymphadenopathy:     He has no cervical adenopathy.   Neurological: He is alert and oriented to person, place, and time. He has normal reflexes. No cranial nerve deficit. Coordination normal.   Skin: Skin is warm. No rash noted. No erythema. No pallor.   Psychiatric: He has a normal mood and affect. His behavior is normal. Judgment and thought content normal.       Assessment:     1. Abnormal cardiovascular stress test    2. PAD (peripheral artery disease)    3. Essential hypertension    4. Atherosclerosis of native artery of right lower extremity with intermittent claudication    5. Hyperlipidemia LDL goal <70    6. Angina, class II        Plan:       LHC via R radial   Even though CP is not occurring with exertion   He has extensive PAD + significant ST depression    Test was terminated because of severe R calf claudication       REZA candidate       He will also have R SFA revascularization  Supera stent this time (this will be his third intervention)  R CFA antegrade access with closure using [employer]'          Compression stockings   20-30 mmHg   Venous insufficiency     If edema persists he will have a venous reflux ultrasound             Continue with current medical plan and lifestyle changes.  Return sooner for concerns or questions. If symptoms persist go to the ED  I have reviewed all pertinent data on this patient       I have reviewed the patient's medical history in detail and updated the computerized patient record.    Orders Placed This Encounter   Procedures    Case Request-Cath Lab: PTA, PERIPHERAL VESSEL     Standing Status:   Standing     Number of Occurrences:   1     Order Specific Question:   CPT Code:     Answer:   [facility] FEM/POPL REVAS W/ATHERECTOMY [99408]     Order Specific Question:   Medical Necessity:     Answer:   Medically Urgent [101]    Case  Request-Cath Lab: Left heart cath     Standing Status:   Standing     Number of Occurrences:   1     Order Specific Question:   CPT Code:     Answer:   ID LEFT HEART CATH INJECT VETRICULOGRAPHY, IMAGE SUPERVISE/INTERP [14649]     Order Specific Question:   Medical Necessity:     Answer:   Medically Urgent [101]       Follow up as scheduled. Return sooner for concerns or questions            He expressed verbal understanding and agreed with the plan        Greater than 50% of the visit of 45 minutes was spent counseling, educating, and coordinating the care of the patient.          Patient's Medications   New Prescriptions    No medications on file   Previous Medications    ASPIRIN (ASPIR-81 ORAL)    Take 1 tablet by mouth.    ATORVASTATIN (LIPITOR) 40 MG TABLET    Take 1 tablet (40 mg total) by mouth once daily.    CILOSTAZOL (PLETAL) 100 MG TAB    Take 1 tablet (100 mg total) by mouth 2 (two) times daily.    CLOPIDOGREL (PLAVIX) 75 MG TABLET    Take 1 tablet (75 mg total) by mouth once daily.    COENZYME Q10 (COQ-10) 100 MG CAPSULE    Take 1 capsule (100 mg total) by mouth once daily.    HYDROCHLOROTHIAZIDE (HYDRODIURIL) 25 MG TABLET    TAKE 1 TABLET(25 MG) BY MOUTH EVERY DAY    LOSARTAN (COZAAR) 100 MG TABLET    TAKE 1 TABLET(100 MG) BY MOUTH EVERY DAY    METOPROLOL SUCCINATE (TOPROL-XL) 25 MG 24 HR TABLET    TAKE 1 TABLET(25 MG) BY MOUTH EVERY DAY    METOPROLOL SUCCINATE (TOPROL-XL) 25 MG 24 HR TABLET    Take one tablet PO once daily   Modified Medications    No medications on file   Discontinued Medications    No medications on file

## 2019-02-27 NOTE — PROGRESS NOTES
PADDM Consent     Mr. Gross was identified as a PADDM potential subject by Dr. Jenkins. He has been diagnosed with PAD and is scheduled for a procedure in March. I met Mr. Gross in the cardiology clinic. We discussed the study in detail, including the purpose, numbers/length, procedures, risk/benefit, cost/payment, contacts (investigators/IR[facility]), alternatives/voluntary nature/withdrawal, confidentiality/HIPPA. He verbalized understanding and had no questions. The consent form was signed on  2/27/19. Patient was given a copy of signed informed consent.     Pt was set up with a [facility] acct and completed the enrollment questionnaire.      Pt was given the Fitbit [employer] HR and linked to pt's [facility]. Told pt to charge the Fitbit every 3-5 days and to make sure he syncs his steps every 3 days.

## 2019-02-27 NOTE — PATIENT INSTRUCTIONS

## 2019-02-27 NOTE — H&P (VIEW-ONLY)
Subjective:   Patient ID:  Domingo Gross Sr. is a 58 y.o. male who presents for follow up of Peripheral Arterial Disease; Claudication; Hyperlipidemia; Hypertension; and Abnormal Stress Test      HPI:     Domingo Gross Sr. 58 y.o. male is here follow up PAD with recurrent winsome IIb claudication after bilateral SFA revascularization R (3/2018) and L (4/2018)- PTA with 6.0 mm Lutonix DCB.  In 6/2017 he had L EIA PTAS for winsome IIb claudication. No ulcers. He is on aspirin and plavix for DAPT. He has R calf claudication. No ulcers. He is also complaining of chest pain at rest but never with exertion. Stress test was abnormal with significant ST depression in inferior latera leads without WMAs.     He has a h/o L SFA atherectomy with 2.2 Phoenix catheter with PTA using using 6.0 x 100 mm Lutonix DCB in 6/2015.  He had distal aorta iliac reconstruction in 6/2014 with 8.0 x 39 mm BES overlapping with 9.0 x 60 in R EIA and 8.0 x 80 mm SES in L EIA post dilated with 9.0 mm bilaterally.       ELISABETH with stress 5/2017:   R 1.01 to 0.44   L 0.92 to 0.45    After 6 minutes ambulation      CTA 5/2017:       Patent distal aorta and bilateral GRUPO/EIA stents   L EIA with de shawna 90% stenosis   L SFA 80% with 3 vessel run off     R EIA/CFA with moderate disease   R SFA 80% stenosis with 3 vessel run off        Peripheral angiogram with intervention 6/2017         Patent bilateral GRUOP/EIA stents.    De shawna 80% left EIA stenosis treated with 9.0 x 80 mm Lifestar  stent post dilated with 8.0 mm balloon.    Bilateral 70-80% SFA stenosis with 3 vessel run off.        3/2018      L SFA intervention for claudication   75% L SFA with 3 vessel run off   7 mmHg resting and 33 mmHg hyperemic mean gradient         L CFA antegrade access   PTA with 6.0 x 150 mm   PTA with 6.0 x 150 mm Lutonix   3 vessel run off           4/13/2018       S/p R SFA PTA for winsome IIb claudication   R CFA antegrade access         R CFA with 50%  stenosis-heavily calcified lesion               Baseline /90         R SFA with 70% stenosis with a significant resting and hyperemic mean gradient     3 vessel run off             PTA of R SFA with 6.0 x 150 + 6.0 x 60 mm Lutonix DCB        5/2/2018   Patent arteries post revascularization        2/2019     R 0.84 to 0.27   L 0.90 to 0.66   After ambulation   Severe R calf claudication          Nuclear stress test 2/2019     + ECG with 2 mm ST depression   No wall motion abnormalities   Test stopped at 6 minutes, 7 METs, 86% predicted heart rate   Stopped because of R leg claudication + ECG changes                               Patient Active Problem List    Diagnosis Date Noted    Atherosclerosis of native artery of right lower extremity with intermittent claudication 04/13/2018     Priority: High    Atherosclerosis of native artery of both lower extremities with intermittent claudication 03/02/2018     Priority: High    Abnormal cardiovascular stress test 02/27/2019         Nuclear stress test 2/2019     + ECG with 2 mm ST depression   No wall motion abnormalities   Test stopped at 6 minutes, 7 METs, 86% predicted heart rate   Stopped because of R leg claudication + ECG changes            Venous insufficiency of both lower extremities 06/04/2018    Localized edema 06/04/2018    Left knee pain 01/19/2016    Pain of left lower extremity 01/19/2016    Difficulty walking 01/19/2016    Acute pain of left knee 12/11/2015    Hyperlipidemia LDL goal <70 06/12/2015    DJD (degenerative joint disease), lumbar 05/27/2015    Lumbar facet arthropathy 05/27/2015    DDD (degenerative disc disease) 05/27/2015    Spondylosis without myelopathy 05/27/2015    Limb pain 11/21/2014    History of back injury 11/21/2014     20 years ago a bag fell on his back at work      Myalgia 11/21/2014    Claudication in peripheral vascular disease 06/13/2014    PAD (peripheral artery disease) 05/21/2014      2014      1. Three vessel runoff below the knee bilaterally.  2. Severe disease of the terminal aorta.  3. Successful PTAS.  4. Bilateral common iliac reconstruction with 8 x 39 mm stent extending in the aorta  5. Right common illiac was treated with an overlapping 9 x 60 mm SES   6. Left common iliac was treated with an overlapping 8 x 80 mm SES down to external iliac  7. All stents were post dilated with 9.0 x 60 mm balloons  8. Moderate bilateral SFA disease  9. Chronically occluded left internal iliac artery        2015      1. 80% mid left SFA with a 20 mmHg resting mean gradient  2. Atherectomy with 2.2 [employer] Volcano catheter  3. PTA with 6.0 x 100 mm Lutonix drug coated balloon        2017      Patent bilateral GRUPO/EIA stents  L EIA PTAS 9.0 x 80 mm Life stent post dilated with 8.0 mm balloon  Bilateral 70-80% SFA stenosis with 3 vessel run off          3/2018      L SFA intervention for claudication   75% L SFA with 3 vessel run off   7 mmHg resting and 33 mmHg hyperemic mean gradient         L CFA antegrade access   PTA with 6.0 x 150 mm   PTA with 6.0 x 150 mm Lutonix   3 vessel run off           2018       S/p R SFA PTA for winsome IIb claudication   R CFA antegrade access         R CFA with 50% stenosis-heavily calcified lesion               Baseline /90         R SFA with 70% stenosis with a significant resting and hyperemic mean gradient     3 vessel run off             PTA of R SFA with 6.0 x 150 + 6.0 x 60 mm Lutonix DCB        2018   Patent arteries post revascularization        2019     R 0.84 to 0.27   L 0.90 to 0.66   After ambulation   Severe R calf claudication                           Atherosclerosis of leg with intermittent claudication 2014     Winsome IIB      Elevated TSH 2013    HTN (hypertension) 2013    Elevated fasting blood sugar 2013           Right Arm BP - Sittin/78  Left Arm BP - Sittin/78          LAST  HbA1c  Lab Results   Component Value Date    HGBA1C 5.8 04/30/2013       Lipid panel  Lab Results   Component Value Date    CHOL 183 10/07/2017    CHOL 166 01/07/2017    CHOL 118 (L) 06/12/2015     Lab Results   Component Value Date    HDL 51 10/07/2017    HDL 50 01/07/2017    HDL 41 06/12/2015     Lab Results   Component Value Date    LDLCALC 107.0 10/07/2017    LDLCALC 88.6 01/07/2017    LDLCALC 54.2 (L) 06/12/2015     Lab Results   Component Value Date    TRIG 125 10/07/2017    TRIG 137 01/07/2017    TRIG 114 06/12/2015     Lab Results   Component Value Date    CHOLHDL 27.9 10/07/2017    CHOLHDL 30.1 01/07/2017    CHOLHDL 34.7 06/12/2015            Review of Systems   Constitution: Negative for diaphoresis, weakness, night sweats, weight gain and weight loss.   HENT: Negative for congestion.    Eyes: Negative for blurred vision, discharge and double vision.   Cardiovascular: Negative for chest pain, claudication, cyanosis, dyspnea on exertion, irregular heartbeat, leg swelling, near-syncope, orthopnea, palpitations, paroxysmal nocturnal dyspnea and syncope.   Respiratory: Negative for cough, shortness of breath and wheezing.    Endocrine: Negative for cold intolerance, heat intolerance and polyphagia.   Hematologic/Lymphatic: Negative for adenopathy and bleeding problem. Does not bruise/bleed easily.   Skin: Negative for dry skin and nail changes.   Musculoskeletal: Negative for arthritis, back pain, falls, joint pain, myalgias and neck pain.   Gastrointestinal: Negative for bloating, abdominal pain, change in bowel habit and constipation.   Genitourinary: Negative for bladder incontinence, dysuria, flank pain, genital sores and missed menses.   Neurological: Negative for aphonia, brief paralysis, difficulty with concentration and dizziness.   Psychiatric/Behavioral: Negative for altered mental status and memory loss. The patient does not have insomnia.    Allergic/Immunologic: Negative for environmental  allergies.       Objective:   Physical Exam   Constitutional: He is oriented to person, place, and time. He appears well-developed and well-nourished. He is not intubated.   HENT:   Head: Normocephalic and atraumatic.   Right Ear: External ear normal.   Left Ear: External ear normal.   Mouth/Throat: Oropharynx is clear and moist.   Eyes: Conjunctivae and EOM are normal. Pupils are equal, round, and reactive to light. Right eye exhibits no discharge. Left eye exhibits no discharge. No scleral icterus.   Neck: Normal range of motion. Neck supple. Normal carotid pulses, no hepatojugular reflux and no JVD present. Carotid bruit is not present. No tracheal deviation present. No thyromegaly present.   Cardiovascular: Normal rate, regular rhythm, S1 normal and S2 normal.  No extrasystoles are present. PMI is not displaced. Exam reveals no gallop, no S3, no distant heart sounds, no friction rub and no midsystolic click.   No murmur heard.  Pulses:       Carotid pulses are 2+ on the right side, and 2+ on the left side.       Radial pulses are 2+ on the right side, and 2+ on the left side.        Femoral pulses are 2+ on the right side, and 2+ on the left side.       Popliteal pulses are 2+ on the right side, and 2+ on the left side.        Dorsalis pedis pulses are 1+ on the right side, and 2+ on the left side.        Posterior tibial pulses are 1+ on the right side, and 2+ on the left side.     Biphasic R DP and PT doppler signals      Triphasic L DP and PT doppler signals                     Pulmonary/Chest: Effort normal and breath sounds normal. No accessory muscle usage or stridor. No apnea, no tachypnea and no bradypnea. He is not intubated. No respiratory distress. He has no decreased breath sounds. He has no wheezes. He has no rales. He exhibits no tenderness and no bony tenderness.   Abdominal: He exhibits no distension, no pulsatile liver, no abdominal bruit, no ascites, no pulsatile midline mass and no mass. There  is no tenderness. There is no rebound and no guarding.   Musculoskeletal: Normal range of motion. He exhibits no edema or tenderness.   Lymphadenopathy:     He has no cervical adenopathy.   Neurological: He is alert and oriented to person, place, and time. He has normal reflexes. No cranial nerve deficit. Coordination normal.   Skin: Skin is warm. No rash noted. No erythema. No pallor.   Psychiatric: He has a normal mood and affect. His behavior is normal. Judgment and thought content normal.       Assessment:     1. Abnormal cardiovascular stress test    2. PAD (peripheral artery disease)    3. Essential hypertension    4. Atherosclerosis of native artery of right lower extremity with intermittent claudication    5. Hyperlipidemia LDL goal <70    6. Angina, class II        Plan:       LHC via R radial   Even though CP is not occurring with exertion   He has extensive PAD + significant ST depression    Test was terminated because of severe R calf claudication       REZA candidate       He will also have R SFA revascularization  Supera stent this time (this will be his third intervention)  R CFA antegrade access with closure using [employer]'          Compression stockings   20-30 mmHg   Venous insufficiency     If edema persists he will have a venous reflux ultrasound             Continue with current medical plan and lifestyle changes.  Return sooner for concerns or questions. If symptoms persist go to the ED  I have reviewed all pertinent data on this patient       I have reviewed the patient's medical history in detail and updated the computerized patient record.    Orders Placed This Encounter   Procedures    Case Request-Cath Lab: PTA, PERIPHERAL VESSEL     Standing Status:   Standing     Number of Occurrences:   1     Order Specific Question:   CPT Code:     Answer:   MA FEM/POPL REVAS W/ATHERECTOMY [[id number]]     Order Specific Question:   Medical Necessity:     Answer:   Medically Urgent [101]    Case  Request-Cath Lab: Left heart cath     Standing Status:   Standing     Number of Occurrences:   1     Order Specific Question:   CPT Code:     Answer:   KS LEFT HEART CATH INJECT VETRICULOGRAPHY, IMAGE SUPERVISE/INTERP [[id number]]     Order Specific Question:   Medical Necessity:     Answer:   Medically Urgent [101]       Follow up as scheduled. Return sooner for concerns or questions            He expressed verbal understanding and agreed with the plan        Greater than 50% of the visit of 45 minutes was spent counseling, educating, and coordinating the care of the patient.          Patient's Medications   New Prescriptions    No medications on file   Previous Medications    ASPIRIN (ASPIR-81 ORAL)    Take 1 tablet by mouth.    ATORVASTATIN (LIPITOR) 40 MG TABLET    Take 1 tablet (40 mg total) by mouth once daily.    CILOSTAZOL (PLETAL) 100 MG TAB    Take 1 tablet (100 mg total) by mouth 2 (two) times daily.    CLOPIDOGREL (PLAVIX) 75 MG TABLET    Take 1 tablet (75 mg total) by mouth once daily.    COENZYME Q10 (COQ-10) 100 MG CAPSULE    Take 1 capsule (100 mg total) by mouth once daily.    HYDROCHLOROTHIAZIDE (HYDRODIURIL) 25 MG TABLET    TAKE 1 TABLET(25 MG) BY MOUTH EVERY DAY    LOSARTAN (COZAAR) 100 MG TABLET    TAKE 1 TABLET(100 MG) BY MOUTH EVERY DAY    METOPROLOL SUCCINATE (TOPROL-XL) 25 MG 24 HR TABLET    TAKE 1 TABLET(25 MG) BY MOUTH EVERY DAY    METOPROLOL SUCCINATE (TOPROL-XL) 25 MG 24 HR TABLET    Take one tablet PO once daily   Modified Medications    No medications on file   Discontinued Medications    No medications on file

## 2019-03-12 ENCOUNTER — PATIENT MESSAGE (OUTPATIENT)
Dept: CARDIOLOGY | Facility: HOSPITAL | Age: 59
End: 2019-03-12

## 2019-03-18 ENCOUNTER — PATIENT MESSAGE (OUTPATIENT)
Dept: INTERNAL MEDICINE | Facility: CLINIC | Age: 59
End: 2019-03-18

## 2019-03-21 ENCOUNTER — TELEPHONE (OUTPATIENT)
Dept: INTERNAL MEDICINE | Facility: CLINIC | Age: 59
End: 2019-03-21

## 2019-03-21 NOTE — TELEPHONE ENCOUNTER
Left detailed message pt ask if he could come in sooner than scheduled. Pt is to call back or send a [facility] message to respond.

## 2019-03-22 ENCOUNTER — OFFICE VISIT (OUTPATIENT)
Dept: INTERNAL MEDICINE | Facility: CLINIC | Age: 59
End: 2019-03-22
Payer: COMMERCIAL

## 2019-03-22 VITALS
OXYGEN SATURATION: 97 % | HEIGHT: 69 IN | DIASTOLIC BLOOD PRESSURE: 60 MMHG | SYSTOLIC BLOOD PRESSURE: 118 MMHG | HEART RATE: 64 BPM | WEIGHT: 180.75 LBS | BODY MASS INDEX: 26.77 KG/M2

## 2019-03-22 DIAGNOSIS — R05.9 COUGH: ICD-10-CM

## 2019-03-22 DIAGNOSIS — J40 BRONCHITIS: Primary | ICD-10-CM

## 2019-03-22 PROCEDURE — 99999 PR PBB SHADOW E&M-EST. PATIENT-LVL III: CPT | Mod: PBBFAC,,, | Performed by: INTERNAL MEDICINE

## 2019-03-22 PROCEDURE — 3074F SYST BP LT 130 MM HG: CPT | Mod: CPTII,S$GLB,, | Performed by: INTERNAL MEDICINE

## 2019-03-22 PROCEDURE — 3078F DIAST BP <80 MM HG: CPT | Mod: CPTII,S$GLB,, | Performed by: INTERNAL MEDICINE

## 2019-03-22 PROCEDURE — 99214 OFFICE O/P EST MOD 30 MIN: CPT | Mod: S$GLB,,, | Performed by: INTERNAL MEDICINE

## 2019-03-22 PROCEDURE — 3008F PR BODY MASS INDEX (BMI) DOCUMENTED: ICD-10-PCS | Mod: CPTII,S$GLB,, | Performed by: INTERNAL MEDICINE

## 2019-03-22 PROCEDURE — 3008F BODY MASS INDEX DOCD: CPT | Mod: CPTII,S$GLB,, | Performed by: INTERNAL MEDICINE

## 2019-03-22 PROCEDURE — 99214 PR OFFICE/OUTPT VISIT, EST, LEVL IV, 30-39 MIN: ICD-10-PCS | Mod: S$GLB,,, | Performed by: INTERNAL MEDICINE

## 2019-03-22 PROCEDURE — 3078F PR MOST RECENT DIASTOLIC BLOOD PRESSURE < 80 MM HG: ICD-10-PCS | Mod: CPTII,S$GLB,, | Performed by: INTERNAL MEDICINE

## 2019-03-22 PROCEDURE — 99999 PR PBB SHADOW E&M-EST. PATIENT-LVL III: ICD-10-PCS | Mod: PBBFAC,,, | Performed by: INTERNAL MEDICINE

## 2019-03-22 PROCEDURE — 3074F PR MOST RECENT SYSTOLIC BLOOD PRESSURE < 130 MM HG: ICD-10-PCS | Mod: CPTII,S$GLB,, | Performed by: INTERNAL MEDICINE

## 2019-03-22 RX ORDER — DOXYCYCLINE HYCLATE 100 MG
100 TABLET ORAL 2 TIMES DAILY
Qty: 14 TABLET | Refills: 0 | Status: SHIPPED | OUTPATIENT
Start: 2019-03-22 | End: 2019-03-29

## 2019-03-22 RX ORDER — FLUNISOLIDE 0.25 MG/ML
2 SOLUTION NASAL 2 TIMES DAILY
Qty: 1 BOTTLE | Refills: 0 | Status: SHIPPED | OUTPATIENT
Start: 2019-03-22 | End: 2022-02-01

## 2019-03-22 RX ORDER — PREDNISONE 20 MG/1
TABLET ORAL
Qty: 21 TABLET | Refills: 0 | Status: ON HOLD | OUTPATIENT
Start: 2019-03-22 | End: 2019-05-10 | Stop reason: HOSPADM

## 2019-03-22 RX ORDER — ALBUTEROL SULFATE 90 UG/1
2 AEROSOL, METERED RESPIRATORY (INHALATION) EVERY 6 HOURS PRN
Qty: 1 EACH | Refills: 0 | Status: SHIPPED | OUTPATIENT
Start: 2019-03-22 | End: 2019-04-11 | Stop reason: SDUPTHER

## 2019-03-22 NOTE — PROGRESS NOTES
Subjective:       Patient ID: Domingo Gross . is a 58 y.o. male.    Chief Complaint: Cough (x 2 wks); URI; and Sore Throat    HPI Mr. Gross is a 58-year-old male who presents with a chief complaint of cough.  Onset was 2 weeks ago.  It is constant.  Overall it is slightly better.  He has been taking Mucinex, but with no relief of his symptoms.  Nothing is making his symptoms better.  He denies any associated fever, runny nose, nasal congestion, postnasal drip, ear pain, or sore throat.  He does have a smoking history but quit in 1999.  He reports that he needs his cough to be gone by next Friday as he is having stents placed next Friday.    Review of Systems   Constitutional: Negative for fever.   HENT: Negative for congestion, ear pain, postnasal drip, rhinorrhea, sinus pressure, sinus pain and sore throat.    Respiratory: Positive for cough.        Objective:      Physical Exam   Constitutional: He is oriented to person, place, and time. He appears well-developed and well-nourished. No distress.   HENT:   Head: Normocephalic and atraumatic.   Right Ear: Tympanic membrane, external ear and ear canal normal.   Left Ear: Tympanic membrane, external ear and ear canal normal.   Nose: Mucosal edema and rhinorrhea present.   Mouth/Throat: Posterior oropharyngeal erythema present. No oropharyngeal exudate or posterior oropharyngeal edema.   Eyes: Conjunctivae and EOM are normal.   Cardiovascular: Normal rate, regular rhythm, normal heart sounds and intact distal pulses. Exam reveals no gallop and no friction rub.   No murmur heard.  Pulmonary/Chest: Effort normal and breath sounds normal. No stridor. No respiratory distress. He has no wheezes. He has no rales.   Coughing throughout exam.   Musculoskeletal: He exhibits no edema or deformity.   Neurological: He is alert and oriented to person, place, and time.   Skin: Skin is warm and dry. He is not diaphoretic.   Psychiatric: He has a normal mood and affect. His  behavior is normal. Judgment and thought content normal.   Vitals reviewed.      Assessment:       1. Bronchitis    2. Cough        Plan:     1. Bronchitis  Doxycycline 100 mg PO BID X 7 days  6 day steroid taper prescribed  Albuterol 2 puffs every 6 hr as needed for cough, shortness of breath, or wheezing    2.  Cough  Postnasal drip possibly contributing  Flonase, 2 sprays in each nostril daily    Return for worsening symptoms such as fever or worsening cough    RTC PRN

## 2019-03-22 NOTE — PATIENT INSTRUCTIONS
Prednisone tablets  What is this medicine?  PREDNISONE (PRED ni sone) is a corticosteroid. It is commonly used to treat inflammation of the skin, joints, lungs, and other organs. Common conditions treated include asthma, allergies, and arthritis. It is also used for other conditions, such as blood disorders and diseases of the adrenal glands.  How should I use this medicine?  Take this medicine by mouth with a glass of water. Follow the directions on the prescription label. Take this medicine with food. If you are taking this medicine once a day, take it in the morning. Do not take more medicine than you are told to take. Do not suddenly stop taking your medicine because you may develop a severe reaction. Your doctor will tell you how much medicine to take. If your doctor wants you to stop the medicine, the dose may be slowly lowered over time to avoid any side effects.  Talk to your pediatrician regarding the use of this medicine in children. Special care may be needed.  What side effects may I notice from receiving this medicine?  Side effects that you should report to your doctor or health care professional as soon as possible:  · allergic reactions like skin rash, itching or hives, swelling of the face, lips, or tongue  · changes in emotions or moods  · changes in vision  · depressed mood  · eye pain  · fever or chills, cough, sore throat, pain or difficulty passing urine  · increased thirst  · swelling of ankles, feet  Side effects that usually do not require medical attention (report to your doctor or health care professional if they continue or are bothersome):  · confusion, excitement, restlessness  · headache  · nausea, vomiting  · skin problems, acne, thin and shiny skin  · trouble sleeping  · weight gain  What may interact with this medicine?  Do not take this medicine with any of the following medications:  · metyrapone  · mifepristone  This medicine may also interact with the following  medications:  · aminoglutethimide  · amphotericin B  · aspirin and aspirin-like medicines  · barbiturates  · certain medicines for diabetes, like glipizide or glyburide  · cholestyramine  · cholinesterase inhibitors  · cyclosporine  · digoxin  · diuretics  · ephedrine  · female hormones, like estrogens and birth control pills  · isoniazid  · ketoconazole  · NSAIDS, medicines for pain and inflammation, like ibuprofen or naproxen  · phenytoin  · rifampin  · toxoids  · vaccines  · warfarin  What if I miss a dose?  If you miss a dose, take it as soon as you can. If it is almost time for your next dose, talk to your doctor or health care professional. You may need to miss a dose or take an extra dose. Do not take double or extra doses without advice.  Where should I keep my medicine?  Keep out of the reach of children.  Store at room temperature between 15 and 30 degrees C (59 and 86 degrees F). Protect from light. Keep container tightly closed. Throw away any unused medicine after the expiration date.  What should I tell my health care provider before I take this medicine?  They need to know if you have any of these conditions:  · Cushing's syndrome  · diabetes  · glaucoma  · heart disease  · high blood pressure  · infection (especially a virus infection such as chickenpox, cold sores, or herpes)  · kidney disease  · liver disease  · mental illness  · myasthenia gravis  · osteoporosis  · seizures  · stomach or intestine problems  · thyroid disease  · an unusual or allergic reaction to lactose, prednisone, other medicines, foods, dyes, or preservatives  · pregnant or trying to get pregnant  · breast-feeding  What should I watch for while using this medicine?  Visit your doctor or health care professional for regular checks on your progress. If you are taking this medicine over a prolonged period, carry an identification card with your name and address, the type and dose of your medicine, and your doctor's name and  address.  This medicine may increase your risk of getting an infection. Tell your doctor or health care professional if you are around anyone with measles or chickenpox, or if you develop sores or blisters that do not heal properly.  If you are going to have surgery, tell your doctor or health care professional that you have taken this medicine within the last twelve months.  Ask your doctor or health care professional about your diet. You may need to lower the amount of salt you eat.  This medicine may affect blood sugar levels. If you have diabetes, check with your doctor or health care professional before you change your diet or the dose of your diabetic medicine.  NOTE:This sheet is a summary. It may not cover all possible information. If you have questions about this medicine, talk to your doctor, pharmacist, or health care provider. Copyright© 2017 [employer]        Albuterol inhalation aerosol  What is this medicine?  ALBUTEROL (al BYOO ter ole) is a bronchodilator. It helps open up the airways in your lungs to make it easier to breathe. This medicine is used to treat and to prevent bronchospasm.  How should I use this medicine?  This medicine is for inhalation through the mouth. Follow the directions on your prescription label. Take your medicine at regular intervals. Do not use more often than directed. Make sure that you are using your inhaler correctly. Ask you doctor or health care provider if you have any questions.  Talk to your pediatrician regarding the use of this medicine in children. Special care may be needed.  What side effects may I notice from receiving this medicine?  Side effects that you should report to your doctor or health care professional as soon as possible:  · allergic reactions like skin rash, itching or hives, swelling of the face, lips, or tongue  · breathing problems  · chest pain  · feeling faint or lightheaded, falls  · high blood pressure  · irregular  heartbeat  · fever  · muscle cramps or weakness  · pain, tingling, numbness in the hands or feet  · vomiting  Side effects that usually do not require medical attention (report to your doctor or health care professional if they continue or are bothersome):  · cough  · difficulty sleeping  · headache  · nervousness or trembling  · stomach upset  · stuffy or runny nose  · throat irritation  · unusual taste  What may interact with this medicine?  · anti-infectives like chloroquine and pentamidine  · caffeine  · cisapride  · diuretics  · medicines for colds  · medicines for depression or for emotional or psychotic conditions  · medicines for weight loss including some herbal products  · methadone  · some antibiotics like clarithromycin, erythromycin, levofloxacin, and linezolid  · some heart medicines  · steroid hormones like dexamethasone, cortisone, hydrocortisone  · theophylline  · thyroid hormones  What if I miss a dose?  If you miss a dose, use it as soon as you can. If it is almost time for your next dose, use only that dose. Do not use double or extra doses.  Where should I keep my medicine?  Keep out of the reach of children.  Store at room temperature between 15 and 30 degrees C (59 and 86 degrees F). The contents are under pressure and may burst when exposed to heat or flame. Do not freeze. This medicine does not work as well if it is too cold. Throw away any unused medicine after the expiration date. Inhalers need to be thrown away after the labeled number of puffs have been used or by the expiration date; whichever comes first. Ventolin HFA should be thrown away 12 months after removing from foil pouch. Check the instructions that come with your medicine.  What should I tell my health care provider before I take this medicine?  They need to know if you have any of the following conditions:  · diabetes  · heart disease or irregular heartbeat  · high blood pressure  · pheochromocytoma  · seizures  · thyroid  disease  · an unusual or allergic reaction to albuterol, levalbuterol, sulfites, other medicines, foods, dyes, or preservatives  · pregnant or trying to get pregnant  · breast-feeding  What should I watch for while using this medicine?  Tell your doctor or health care professional if your symptoms do not improve. Do not use extra albuterol. If your asthma or bronchitis gets worse while you are using this medicine, call your doctor right away.  If your mouth gets dry try chewing sugarless gum or sucking hard candy. Drink water as directed.  NOTE:This sheet is a summary. It may not cover all possible information. If you have questions about this medicine, talk to your doctor, pharmacist, or health care provider. Copyright© 2017 [employer]        Doxycycline tablets or capsules  What is this medicine?  DOXYCYCLINE (dox rehana LEONGRIOS ander) is a tetracycline antibiotic. It kills certain bacteria or stops their growth. It is used to treat many kinds of infections, like dental, skin, respiratory, and urinary tract infections. It also treats acne, Lyme disease, malaria, and certain sexually transmitted infections.  How should I use this medicine?  Take this medicine by mouth with a full glass of water. Follow the directions on the prescription label. It is best to take this medicine without food, but if it upsets your stomach take it with food. Take your medicine at regular intervals. Do not take your medicine more often than directed. Take all of your medicine as directed even if you think you are better. Do not skip doses or stop your medicine early.  Talk to your pediatrician regarding the use of this medicine in children. While this drug may be prescribed for selected conditions, precautions do apply.  What side effects may I notice from receiving this medicine?  Side effects that you should report to your doctor or health care professional as soon as possible:  · allergic reactions like skin rash, itching or hives,  swelling of the face, lips, or tongue  · difficulty breathing  · fever  · itching in the rectal or genital area  · pain on swallowing  · redness, blistering, peeling or loosening of the skin, including inside the mouth  · severe stomach pain or cramps  · unusual bleeding or bruising  · unusually weak or tired  · yellowing of the eyes or skin  Side effects that usually do not require medical attention (report to your doctor or health care professional if they continue or are bothersome):  · diarrhea  · loss of appetite  · nausea, vomiting  What may interact with this medicine?  · antacids  · barbiturates  · birth control pills  · bismuth subsalicylate  · carbamazepine  · methoxyflurane  · other antibiotics  · phenytoin  · vitamins that contain iron  · warfarin  What if I miss a dose?  If you miss a dose, take it as soon as you can. If it is almost time for your next dose, take only that dose. Do not take double or extra doses.  Where should I keep my medicine?  Keep out of the reach of children.  Store at room temperature, below 30 degrees C (86 degrees F). Protect from light. Keep container tightly closed. Throw away any unused medicine after the expiration date. Taking this medicine after the expiration date can make you seriously ill.  What should I tell my health care provider before I take this medicine?  They need to know if you have any of these conditions:  · liver disease  · long exposure to sunlight like working outdoors  · stomach problems like colitis  · an unusual or allergic reaction to doxycycline, tetracycline antibiotics, other medicines, foods, dyes, or preservatives  · pregnant or trying to get pregnant  · breast-feeding  What should I watch for while using this medicine?  Tell your doctor or health care professional if your symptoms do not improve.  Do not treat diarrhea with over the counter products. Contact your doctor if you have diarrhea that lasts more than 2 days or if it is severe and  watery.  Do not take this medicine just before going to bed. It may not dissolve properly when you lay down and can cause pain in your throat. Drink plenty of fluids while taking this medicine to also help reduce irritation in your throat.  This medicine can make you more sensitive to the sun. Keep out of the sun. If you cannot avoid being in the sun, wear protective clothing and use sunscreen. Do not use sun lamps or tanning beds/booths.  Birth control pills may not work properly while you are taking this medicine. Talk to your doctor about using an extra method of birth control.  If you are being treated for a sexually transmitted infection, avoid sexual contact until you have finished your treatment. Your sexual partner may also need treatment.  Avoid antacids, aluminum, calcium, magnesium, and iron products for 4 hours before and 2 hours after taking a dose of this medicine.  If you are using this medicine to prevent malaria, you should still protect yourself from contact with mosquitos. Stay in screened-in areas, use mosquito nets, keep your body covered, and use an insect repellent.  NOTE:This sheet is a summary. It may not cover all possible information. If you have questions about this medicine, talk to your doctor, pharmacist, or health care provider. Copyright© 2017 [employer]        Acute Bronchitis  Your healthcare provider has told you that you have acute bronchitis. Bronchitis is infection or inflammation of the bronchial tubes (airways in the lungs). Normally, air moves easily in and out of the airways. Bronchitis narrows the airways, making it harder for air to flow in and out of the lungs. This causes symptoms such as shortness of breath, coughing up yellow or green mucus, and wheezing. Bronchitis can be acute or chronic. Acute means the condition comes on quickly and goes away in a short time, usually within 3 to 10 days. Chronic means a condition lasts a long time and often comes back.    What  causes acute bronchitis?  Acute bronchitis almost always starts as a viral respiratory infection, such as a cold or the flu. Certain factors make it more likely for a cold or flu to turn into bronchitis. These include being very young, being elderly, having a heart or lung problem, or having a weak immune system. Cigarette smoking also makes bronchitis more likely.  When bronchitis develops, the airways become swollen. The airways may also become infected with bacteria. This is known as a secondary infection.  Diagnosing acute bronchitis  Your healthcare provider will examine you and ask about your symptoms and health history. You may also have a sputum culture to test the fluid in your lungs. Chest X-rays may be done to look for infection in the lungs.  Treating acute bronchitis  Bronchitis usually clears up as the cold or flu goes away. You can help feel better faster by doing the following:  · Take medicine as directed. You may be told to take ibuprofen or other over-the-counter medicines. These help relieve inflammation in your bronchial tubes. Your healthcare provider may prescribe an inhaler to help open up the bronchial tubes. Most of the time, acute bronchitis is caused by a viral infection. Antibiotics are usually not prescribed for viral infections.  · Drink plenty of fluids, such as water, juice, or warm soup. Fluids loosen mucus so that you can cough it up. This helps you breathe more easily. Fluids also prevent dehydration.  · Make sure you get plenty of rest.  · Do not smoke. Do not allow anyone else to smoke in your home.  Recovery and follow-up  Follow up with your doctor as you are told. You will likely feel better in a week or two. But a dry cough can linger beyond that time. Let your doctor know if you still have symptoms (other than a dry cough) after 2 weeks, or if youre prone to getting bronchial infections. Take steps to protect yourself from future infections. These steps include stopping  smoking and avoiding tobacco smoke, washing your hands often, and getting a yearly flu shot.  When to call your healthcare provider  Call the healthcare provider if you have any of the following:  · Fever of 100.4°F (38.0°C) or higher, or as advised  · Symptoms that get worse, or new symptoms  · Trouble breathing  · Symptoms that dont start to improve within a week, or within 3 days of taking antibiotics   Date Last Reviewed: 12/1/2016  © 9464-7579 [employer]. [address] [place] [address]. All rights reserved. This information is not intended as a substitute for professional medical care. Always follow your healthcare professional's instructions.

## 2019-03-26 ENCOUNTER — TELEPHONE (OUTPATIENT)
Dept: ADMINISTRATIVE | Facility: HOSPITAL | Age: 59
End: 2019-03-26

## 2019-03-27 NOTE — TELEPHONE ENCOUNTER
Outgoing call to patient regarding health maintenance - Colorectal - screening kit. Patient states he still has his other kit and will complete and send in.

## 2019-03-29 ENCOUNTER — TELEPHONE (OUTPATIENT)
Dept: CARDIOLOGY | Facility: CLINIC | Age: 59
End: 2019-03-29

## 2019-03-29 ENCOUNTER — HOSPITAL ENCOUNTER (OUTPATIENT)
Facility: HOSPITAL | Age: 59
Discharge: HOME | End: 2019-03-29
Attending: INTERNAL MEDICINE | Admitting: INTERNAL MEDICINE
Payer: COMMERCIAL

## 2019-03-29 VITALS
HEIGHT: 70 IN | TEMPERATURE: 98 F | HEART RATE: 65 BPM | RESPIRATION RATE: 13 BRPM | BODY MASS INDEX: 25.77 KG/M2 | WEIGHT: 180 LBS | DIASTOLIC BLOOD PRESSURE: 74 MMHG | SYSTOLIC BLOOD PRESSURE: 138 MMHG | OXYGEN SATURATION: 98 %

## 2019-03-29 DIAGNOSIS — R94.39 ABNORMAL CARDIOVASCULAR STRESS TEST: ICD-10-CM

## 2019-03-29 DIAGNOSIS — I70.213 ATHEROSCLEROSIS OF NATIVE ARTERY OF BOTH LOWER EXTREMITIES WITH INTERMITTENT CLAUDICATION: Primary | ICD-10-CM

## 2019-03-29 DIAGNOSIS — R07.9 CHEST PAIN, UNSPECIFIED TYPE: Primary | ICD-10-CM

## 2019-03-29 DIAGNOSIS — I20.9 ANGINA, CLASS II: ICD-10-CM

## 2019-03-29 PROBLEM — I25.111 CORONARY ARTERY DISEASE INVOLVING NATIVE CORONARY ARTERY OF NATIVE HEART WITH ANGINA PECTORIS WITH DOCUMENTED SPASM: Status: ACTIVE | Noted: 2019-03-29

## 2019-03-29 LAB
ANION GAP SERPL CALC-SCNC: 9 MMOL/L (ref 8–16)
BUN SERPL-MCNC: 27 MG/DL (ref 6–20)
CALCIUM SERPL-MCNC: 8.9 MG/DL (ref 8.7–10.5)
CHLORIDE SERPL-SCNC: 102 MMOL/L (ref 95–110)
CHOLEST SERPL-MCNC: 186 MG/DL (ref 120–199)
CHOLEST/HDLC SERPL: 2.5 {RATIO} (ref 2–5)
CO2 SERPL-SCNC: 30 MMOL/L (ref 23–29)
CREAT SERPL-MCNC: 1.2 MG/DL (ref 0.5–1.4)
ERYTHROCYTE [DISTWIDTH] IN BLOOD BY AUTOMATED COUNT: 12.9 % (ref 11.5–14.5)
EST. GFR  (AFRICAN AMERICAN): >60 ML/MIN/1.73 M^2
EST. GFR  (NON AFRICAN AMERICAN): >60 ML/MIN/1.73 M^2
GLUCOSE SERPL-MCNC: 95 MG/DL (ref 70–110)
HCT VFR BLD AUTO: 28.9 % (ref 40–54)
HDLC SERPL-MCNC: 74 MG/DL (ref 40–75)
HDLC SERPL: 39.8 % (ref 20–50)
HGB BLD-MCNC: 9.9 G/DL (ref 14–18)
LDLC SERPL CALC-MCNC: 92.2 MG/DL (ref 63–159)
MCH RBC QN AUTO: 27.5 PG (ref 27–31)
MCHC RBC AUTO-ENTMCNC: 34.3 G/DL (ref 32–36)
MCV RBC AUTO: 80 FL (ref 82–98)
NONHDLC SERPL-MCNC: 112 MG/DL
PLATELET # BLD AUTO: 345 K/UL (ref 150–350)
PMV BLD AUTO: 8.8 FL (ref 9.2–12.9)
POTASSIUM SERPL-SCNC: 2.7 MMOL/L (ref 3.5–5.1)
RBC # BLD AUTO: 3.6 M/UL (ref 4.6–6.2)
SODIUM SERPL-SCNC: 141 MMOL/L (ref 136–145)
TRIGL SERPL-MCNC: 99 MG/DL (ref 30–150)
WBC # BLD AUTO: 7.7 K/UL (ref 3.9–12.7)

## 2019-03-29 PROCEDURE — 93458 L HRT ARTERY/VENTRICLE ANGIO: CPT | Mod: 26,59 | Performed by: INTERNAL MEDICINE

## 2019-03-29 PROCEDURE — 36415 COLL VENOUS BLD VENIPUNCTURE: CPT

## 2019-03-29 PROCEDURE — C1887 CATHETER, GUIDING: HCPCS | Performed by: INTERNAL MEDICINE

## 2019-03-29 PROCEDURE — 93010 EKG 12-LEAD: ICD-10-PCS | Mod: ,,, | Performed by: STUDENT IN AN ORGANIZED HEALTH CARE EDUCATION/TRAINING PROGRAM

## 2019-03-29 PROCEDURE — C1725 CATH, TRANSLUMIN NON-LASER: HCPCS | Performed by: INTERNAL MEDICINE

## 2019-03-29 PROCEDURE — 80061 LIPID PANEL: CPT

## 2019-03-29 PROCEDURE — 93010 EKG 12-LEAD: ICD-10-PCS | Mod: ,,, | Performed by: INTERNAL MEDICINE

## 2019-03-29 PROCEDURE — 93010 ELECTROCARDIOGRAM REPORT: CPT | Mod: ,,, | Performed by: STUDENT IN AN ORGANIZED HEALTH CARE EDUCATION/TRAINING PROGRAM

## 2019-03-29 PROCEDURE — 92928 PRQ TCAT PLMT NTRAC ST 1 LES: CPT | Mod: RC,,, | Performed by: INTERNAL MEDICINE

## 2019-03-29 PROCEDURE — 99220 PR INITIAL OBSERVATION CARE,LEVL III: ICD-10-PCS | Mod: 25,,, | Performed by: INTERNAL MEDICINE

## 2019-03-29 PROCEDURE — 99152 MOD SED SAME PHYS/QHP 5/>YRS: CPT | Performed by: INTERNAL MEDICINE

## 2019-03-29 PROCEDURE — 99152 MOD SED SAME PHYS/QHP 5/>YRS: CPT | Mod: ,,, | Performed by: INTERNAL MEDICINE

## 2019-03-29 PROCEDURE — 93458 PR CATH PLACE/CORON ANGIO, IMG SUPER/INTERP,W LEFT HEART VENTRICULOGRAPHY: ICD-10-PCS | Mod: 26,59,, | Performed by: INTERNAL MEDICINE

## 2019-03-29 PROCEDURE — 93005 ELECTROCARDIOGRAM TRACING: CPT

## 2019-03-29 PROCEDURE — 99220 PR INITIAL OBSERVATION CARE,LEVL III: CPT | Mod: 25,,, | Performed by: INTERNAL MEDICINE

## 2019-03-29 PROCEDURE — C1874 STENT, COATED/COV W/DEL SYS: HCPCS | Performed by: INTERNAL MEDICINE

## 2019-03-29 PROCEDURE — 99152 PR MOD CONSCIOUS SEDATION, SAME PHYS, 5+ YRS, FIRST 15 MIN: ICD-10-PCS | Mod: ,,, | Performed by: INTERNAL MEDICINE

## 2019-03-29 PROCEDURE — C9600 PERC DRUG-EL COR STENT SING: HCPCS | Mod: RC | Performed by: INTERNAL MEDICINE

## 2019-03-29 PROCEDURE — 80048 BASIC METABOLIC PNL TOTAL CA: CPT

## 2019-03-29 PROCEDURE — 93458 L HRT ARTERY/VENTRICLE ANGIO: CPT | Mod: 26,59,, | Performed by: INTERNAL MEDICINE

## 2019-03-29 PROCEDURE — 25500020 PHARM REV CODE 255: Performed by: INTERNAL MEDICINE

## 2019-03-29 PROCEDURE — 85347 COAGULATION TIME ACTIVATED: CPT | Performed by: INTERNAL MEDICINE

## 2019-03-29 PROCEDURE — C1894 INTRO/SHEATH, NON-LASER: HCPCS | Performed by: INTERNAL MEDICINE

## 2019-03-29 PROCEDURE — 25000003 PHARM REV CODE 250: Performed by: INTERNAL MEDICINE

## 2019-03-29 PROCEDURE — 93010 ELECTROCARDIOGRAM REPORT: CPT | Mod: ,,, | Performed by: INTERNAL MEDICINE

## 2019-03-29 PROCEDURE — C1769 GUIDE WIRE: HCPCS | Performed by: INTERNAL MEDICINE

## 2019-03-29 PROCEDURE — 63600175 PHARM REV CODE 636 W HCPCS: Performed by: INTERNAL MEDICINE

## 2019-03-29 PROCEDURE — 85027 COMPLETE CBC AUTOMATED: CPT

## 2019-03-29 PROCEDURE — 92978 ENDOLUMINL IVUS OCT C 1ST: CPT | Mod: 26,,, | Performed by: INTERNAL MEDICINE

## 2019-03-29 PROCEDURE — C9601 PERC DRUG-EL COR STENT BRAN: HCPCS | Mod: RC | Performed by: INTERNAL MEDICINE

## 2019-03-29 PROCEDURE — 99153 MOD SED SAME PHYS/QHP EA: CPT | Performed by: INTERNAL MEDICINE

## 2019-03-29 PROCEDURE — 92978 PR IVUS, CORONARY, 1ST VESSEL: ICD-10-PCS | Mod: 26,,, | Performed by: INTERNAL MEDICINE

## 2019-03-29 PROCEDURE — C1753 CATH, INTRAVAS ULTRASOUND: HCPCS | Performed by: INTERNAL MEDICINE

## 2019-03-29 PROCEDURE — 92928 PR STENT: ICD-10-PCS | Mod: RC,,, | Performed by: INTERNAL MEDICINE

## 2019-03-29 DEVICE — STENT RONYX35022UX RESOLUTE ONYX 3.50X22
Type: IMPLANTABLE DEVICE | Site: CORONARY | Status: FUNCTIONAL
Brand: RESOLUTE ONYX™

## 2019-03-29 DEVICE — STENT RONYX25015UX RESOLUTE ONYX 2.50X15
Type: IMPLANTABLE DEVICE | Site: CORONARY | Status: FUNCTIONAL
Brand: RESOLUTE ONYX™

## 2019-03-29 DEVICE — STENT RONYX25030UX RESOLUTE ONYX 2.50X30
Type: IMPLANTABLE DEVICE | Site: CORONARY | Status: FUNCTIONAL
Brand: RESOLUTE ONYX™

## 2019-03-29 RX ORDER — MORPHINE SULFATE 4 MG/ML
2 INJECTION, SOLUTION INTRAMUSCULAR; INTRAVENOUS
Status: DISCONTINUED | OUTPATIENT
Start: 2019-03-29 | End: 2019-03-29 | Stop reason: HOSPADM

## 2019-03-29 RX ORDER — IODIXANOL 320 MG/ML
INJECTION, SOLUTION INTRAVASCULAR
Status: DISCONTINUED | OUTPATIENT
Start: 2019-03-29 | End: 2019-03-29 | Stop reason: HOSPADM

## 2019-03-29 RX ORDER — DIPHENHYDRAMINE HYDROCHLORIDE 50 MG/ML
INJECTION INTRAMUSCULAR; INTRAVENOUS
Status: DISCONTINUED | OUTPATIENT
Start: 2019-03-29 | End: 2019-03-29 | Stop reason: HOSPADM

## 2019-03-29 RX ORDER — FENTANYL CITRATE 50 UG/ML
INJECTION, SOLUTION INTRAMUSCULAR; INTRAVENOUS
Status: DISCONTINUED | OUTPATIENT
Start: 2019-03-29 | End: 2019-03-29 | Stop reason: HOSPADM

## 2019-03-29 RX ORDER — DIPHENHYDRAMINE HCL 25 MG
50 CAPSULE ORAL ONCE
Status: DISCONTINUED | OUTPATIENT
Start: 2019-03-29 | End: 2019-03-29 | Stop reason: HOSPADM

## 2019-03-29 RX ORDER — VERAPAMIL HYDROCHLORIDE 2.5 MG/ML
INJECTION, SOLUTION INTRAVENOUS
Status: DISCONTINUED | OUTPATIENT
Start: 2019-03-29 | End: 2019-03-29 | Stop reason: HOSPADM

## 2019-03-29 RX ORDER — SODIUM CHLORIDE 9 MG/ML
INJECTION, SOLUTION INTRAVENOUS CONTINUOUS
Status: DISCONTINUED | OUTPATIENT
Start: 2019-03-29 | End: 2019-03-29 | Stop reason: HOSPADM

## 2019-03-29 RX ORDER — ONDANSETRON 2 MG/ML
4 INJECTION INTRAMUSCULAR; INTRAVENOUS EVERY 12 HOURS PRN
Status: DISCONTINUED | OUTPATIENT
Start: 2019-03-29 | End: 2019-03-29 | Stop reason: HOSPADM

## 2019-03-29 RX ORDER — HEPARIN SODIUM 200 [USP'U]/100ML
INJECTION, SOLUTION INTRAVENOUS
Status: DISCONTINUED | OUTPATIENT
Start: 2019-03-29 | End: 2019-03-29 | Stop reason: HOSPADM

## 2019-03-29 RX ORDER — HEPARIN SODIUM 1000 [USP'U]/ML
INJECTION, SOLUTION INTRAVENOUS; SUBCUTANEOUS
Status: DISCONTINUED | OUTPATIENT
Start: 2019-03-29 | End: 2019-03-29 | Stop reason: HOSPADM

## 2019-03-29 RX ORDER — LIDOCAINE HYDROCHLORIDE 10 MG/ML
INJECTION, SOLUTION EPIDURAL; INFILTRATION; INTRACAUDAL; PERINEURAL
Status: DISCONTINUED | OUTPATIENT
Start: 2019-03-29 | End: 2019-03-29 | Stop reason: HOSPADM

## 2019-03-29 RX ORDER — ACETAMINOPHEN 325 MG/1
650 TABLET ORAL EVERY 4 HOURS PRN
Status: DISCONTINUED | OUTPATIENT
Start: 2019-03-29 | End: 2019-03-29 | Stop reason: HOSPADM

## 2019-03-29 RX ORDER — HYDRALAZINE HYDROCHLORIDE 20 MG/ML
10 INJECTION INTRAMUSCULAR; INTRAVENOUS ONCE
Status: COMPLETED | OUTPATIENT
Start: 2019-03-29 | End: 2019-03-29

## 2019-03-29 RX ORDER — POTASSIUM CHLORIDE 20 MEQ/1
60 TABLET, EXTENDED RELEASE ORAL ONCE
Status: COMPLETED | OUTPATIENT
Start: 2019-03-29 | End: 2019-03-29

## 2019-03-29 RX ORDER — MIDAZOLAM HYDROCHLORIDE 1 MG/ML
INJECTION, SOLUTION INTRAMUSCULAR; INTRAVENOUS
Status: DISCONTINUED | OUTPATIENT
Start: 2019-03-29 | End: 2019-03-29 | Stop reason: HOSPADM

## 2019-03-29 RX ADMIN — POTASSIUM CHLORIDE 60 MEQ: 20 TABLET, EXTENDED RELEASE ORAL at 07:03

## 2019-03-29 RX ADMIN — POTASSIUM CHLORIDE 60 MEQ: 20 TABLET, EXTENDED RELEASE ORAL at 11:03

## 2019-03-29 RX ADMIN — HYDRALAZINE HYDROCHLORIDE 10 MG: 20 INJECTION INTRAMUSCULAR; INTRAVENOUS at 07:03

## 2019-03-29 RX ADMIN — SODIUM CHLORIDE: 0.9 INJECTION, SOLUTION INTRAVENOUS at 06:03

## 2019-03-29 NOTE — Clinical Note
299 ml injected throughout the case. 101 mL total wasted during the case. 400 mL total used in the case.

## 2019-03-29 NOTE — NURSING
1045  Patient transferred to recovery cath lab slot 5 via stretcher with side rails up x2 .  Pt drowsy but able to follow commands. Pt is stable when connecting to cardiac monitors.  VSS. Right radial vasc band in place with 15ml of air in band c.d.i. no drainage or noted. No redness, bruising, or hematoma noted around site. +2 mag radial pulses palpated.  dopplered mag pedal pulses.  Skin normal in color and warm to touch, <3 sec cap refill.  Fall risk precautions given and patient acknowledges.

## 2019-03-29 NOTE — BRIEF OP NOTE
S/p LHC via R radial        LM, LAD, and LCX are patent with luminal irregularities  RCA mid 95% calcified lesion  Normal EF with LVEDP 8 mmHg        PCI of mid LAD with 2.5 x 30 and 2.5 x 15 mm Resolute REZA  PCI of proximal RCA to treat guide dissection with 3.5 x 22 Resolute REZA        Plan:      Aspirin  Statin   Plavix  Follow up for R SFA revascularization in 4 weeks

## 2019-03-29 NOTE — DISCHARGE INSTRUCTIONS
Discharge Instructions:    Do not drive a car, operate heavy equipment, care for a young child, etc for the next 12-24 hours.   Avoid drinking alcohol for 24 hours.  Do not make any important decisions for 24 hours.    Drink fluids to keep hydrated. Resume your usual diet as tolerated.     Rest for today then activity as tolerated.   Do not lift anything over 5 pounds for the first 3 days after procedure.    Remove dressing tomorrow then may shower with warm soapy water. Do not scrub site. Pat dry.   May apply bandaid for 2 days.  No tub baths.  Do not submerge wound in water for 3 days.     Call MD for any unrelieved pain, excessive nausea or vomiting, redness around site, bleeding, or pus or foul smelling drainage, or any other questions or concerns.    Go to the ER for any difficulty breathing or chest pain.      If site swells or bleeds, hold direct pressure to area for 10 full minutes.   If site continues to bleed, continue to hold pressure to site and have someone bring you to the ER.      Follow any additional instructions given to you by MD.      Call MD to schedule a follow up appointment, or follow up as instructed.      Understanding Transradial Cardiac Catheterization    Cardiac catheterization (cardiac cath) is a common, non-surgical procedure. During the procedure, your doctor will insert a long, thin tube (catheter) into an artery and move it up into your heart. Transradial means the catheter is inserted into an artery in the wrist (the radial artery). This procedure can be used to diagnose and treat certain heart problems.  Why do I need a transradial cardiac cath?  You may need a cardiac cath if signs indicate a problem with your heart. These may include:  · Symptoms of chest pain, tightness, or heaviness (known as angina). This is a common symptom of blocked heart arteries, known as coronary artery disease.  · Symptoms of weakness, dizziness, trouble breathing, or swollen legs or feet. These may be  symptoms of a problem with a heart valve or the heart muscle.  · Other test results show heart problems. Tests may include stress tests, heart scans, and echocardiography.  During a cardiac cath, your doctor can see the condition of the coronary arteries and heart valves. He or she can also check how well the heart pumps and the flow of blood through the heart. Your doctor can also measure pressures and take blood samples. And, if needed, he or she can open blocked arteries. This can help reduce symptoms of angina.  Cardiac cath is often done using a catheter inserted into an artery in the groin. During transradial cardiac cath, the catheter is inserted into an artery in the wrist. This can mean less bleeding and a faster recovery. Some people may have blockages in the groin arteries as well as in the heart arteries, making it difficult to reach the heart. The transradial approach can be used to get around this problem.   What happens during a transradial cardiac cath?  The procedure is done in the hospital or a surgery center. First, an IV line is put in your arm or hand to deliver fluids and medicines. You will likely be given medicine to relax you and make you drowsy. When the procedure begins:  · You lie on an X-ray table.  · The skin over the insertion site in your wrist is numbed.  · The doctor makes a tiny puncture or incision into the artery in the wrist. He or she then inserts a catheter and threads it through the blood vessel into your heart.    · The doctor may inject a contrast fluid through the catheter into the arteries. This fluid makes the arteries show up better on X-rays.  · Tests may be done to check the condition of your heart and arteries. If needed, the doctor can clear blockages in the arteries or do other repairs.  · When the doctor is finished, he or she will remove the catheter and put direct pressure on the site to prevent bleeding.  · You will stay for a time to recover, and then go  home.  What are the risks of transradial cardiac cath?  These include:  · Bleeding, bruising, infection, or blood clots  · Damage to the radial artery that may cause injury to the hand  · Allergic reaction to the contrast fluid  · Abnormal heartbeat (arrhythmia)  · Damage to blood vessels or tissues  · Kidney damage or failure  · The need for emergency heart surgery  · Heart attack, stroke, or death  Date Last Reviewed: 5/1/2016  © 7367-4587 [employer]. [address] [place] [address]. All rights reserved. This information is not intended as a substitute for professional medical care. Always follow your healthcare professional's instructions.        Recovery After Procedural Sedation (Adult)  You have been given medicine by vein to make you sleep during your surgery. This may have included both a pain medicine and sleeping medicine. Most of the effects have worn off. But you may still have some drowsiness for the next 6 to 8 hours.  Home care  Follow these guidelines when you get home:  · For the next 8 hours, you should be watched by a responsible adult. This person should make sure your condition is not getting worse.  · Don't drink any alcohol for the next 24 hours.  · Don't drive, operate dangerous machinery, or make important business or personal decisions during the next 24 hours.  Note: Your healthcare provider may tell you not to take any medicine by mouth for pain or sleep in the next 4 hours. These medicines may react with the medicines you were given in the hospital. This could cause a much stronger response than usual.  Follow-up care  Follow up with your healthcare provider if you are not alert and back to your usual level of activity within 12 hours.  When to seek medical advice  Call your healthcare provider right away if any of these occur:  · Drowsiness gets worse  · Weakness or dizziness gets worse  · Repeated vomiting  · You can't be awakened   Date Last Reviewed:  10/18/2016  © 7157-3498 [employer]. [address] [place] [address], [place] [address]. All rights reserved. This information is not intended as a substitute for professional medical care. Always follow your healthcare professional's instructions.

## 2019-03-29 NOTE — NURSING
Remaining air removed from right radial vasc band. Gauze and tegaderm dressing applied c.d.i.   No drainage or shadowing noted. No bleeding or hematoma noted around site. +2 mag radial pulses palpated. Skin normal in color, warm to touch, < 3 sec cap refill.   Pt tolerated well.  Will continue to monitor pt.

## 2019-03-29 NOTE — TELEPHONE ENCOUNTER
----- Message from Abena Cleary sent at 3/29/2019  1:25 PM CDT -----  Contact: self/[phone]  Patient needs to get a return to work notice from your office faxed to him at the number listed below.    Please call and advise when done.    Fax number is [phone]

## 2019-03-29 NOTE — INTERVAL H&P NOTE
The patient has been examined and the H&P has been reviewed:        Anesthesia/Surgery risks, benefits and alternative options discussed and understood by patient/family.          Active Hospital Problems    Diagnosis  POA    *Abnormal cardiovascular stress test [R94.39]  Yes         Nuclear stress test 2/2019     + ECG with 2 mm ST depression   No wall motion abnormalities   Test stopped at 6 minutes, 7 METs, 86% predicted heart rate   Stopped because of R leg claudication + ECG changes            Atherosclerosis of native artery of right lower extremity with intermittent claudication [I70.211]  Yes     Priority: High    Atherosclerosis of native artery of both lower extremities with intermittent claudication [I70.213]  Yes     Priority: High    Venous insufficiency of both lower extremities [I87.2]  Yes    Hyperlipidemia LDL goal <70 [E78.5]  Yes    PAD (peripheral artery disease) [I73.9]  Yes     6/13/2014      1. Three vessel runoff below the knee bilaterally.  2. Severe disease of the terminal aorta.  3. Successful PTAS.  4. Bilateral common iliac reconstruction with 8 x 39 mm stent extending in the aorta  5. Right common illiac was treated with an overlapping 9 x 60 mm SES   6. Left common iliac was treated with an overlapping 8 x 80 mm SES down to external iliac  7. All stents were post dilated with 9.0 x 60 mm balloons  8. Moderate bilateral SFA disease  9. Chronically occluded left internal iliac artery        6/12/2015      1. 80% mid left SFA with a 20 mmHg resting mean gradient  2. Atherectomy with 2.2 travelfoxnix Volcano catheter  3. PTA with 6.0 x 100 mm Lutonix drug coated balloon        6/9/2017      Patent bilateral GRUPO/EIA stents  L EIA PTAS 9.0 x 80 mm Life stent post dilated with 8.0 mm balloon  Bilateral 70-80% SFA stenosis with 3 vessel run off          3/2018      L SFA intervention for claudication   75% L SFA with 3 vessel run off   7 mmHg resting and 33 mmHg hyperemic mean gradient          L CFA antegrade access   PTA with 6.0 x 150 mm   PTA with 6.0 x 150 mm Lutonix   3 vessel run off           4/13/2018       S/p R SFA PTA for winsome IIb claudication   R CFA antegrade access         R CFA with 50% stenosis-heavily calcified lesion               Baseline /90         R SFA with 70% stenosis with a significant resting and hyperemic mean gradient     3 vessel run off             PTA of R SFA with 6.0 x 150 + 6.0 x 60 mm Lutonix DCB        5/2/2018   Patent arteries post revascularization        2/2019     R 0.84 to 0.27   L 0.90 to 0.66   After ambulation   Severe R calf claudication                           Atherosclerosis of leg with intermittent claudication [I70.219]  Yes     Winsome IIB      HTN (hypertension) [I10]  Yes      Resolved Hospital Problems   No resolved problems to display.

## 2019-03-29 NOTE — NURSING
IV removed intact dressing applied. Awake and alert x 3. denies  Any pain or SOB. Right radial site CDI, no bleeding or hematoma see. Ambulated in unit without difficulty. Out of unit by RN in wheelchair.

## 2019-04-02 LAB — CATH EF QUANTITATIVE: 55 %

## 2019-04-03 ENCOUNTER — PATIENT MESSAGE (OUTPATIENT)
Dept: CARDIOLOGY | Facility: CLINIC | Age: 59
End: 2019-04-03

## 2019-04-04 LAB
POC ACTIVATED CLOTTING TIME K: 180 SEC (ref 74–137)
POC ACTIVATED CLOTTING TIME K: 197 SEC (ref 74–137)
POC ACTIVATED CLOTTING TIME K: 202 SEC (ref 74–137)
SAMPLE: ABNORMAL

## 2019-04-05 NOTE — DISCHARGE SUMMARY
[facility]  Cardiology  Discharge Summary      Patient Name: Domingo Gross Sr.  MRN: [id number]  Admission Date: 3/29/2019  Hospital Length of Stay: 0 days  Discharge Date and Time: 3/29/2019  1:00 PM  Attending Physician: No att. providers found  Discharging Provider: BAO Ignacio, ANP  Primary Care Physician: Analy Encarnacion MD    HPI: 58 y.o. male is here follow up PAD with recurrent winsome IIb claudication after bilateral SFA revascularization R (3/2018) and L (4/2018)- PTA with 6.0 mm Lutonix DCB.  In 6/2017 he had L EIA PTAS for winsome IIb claudication. No ulcers. He is on aspirin and plavix for DAPT. He has R calf claudication. No ulcers. He is also complaining of chest pain at rest but never with exertion. Stress test was abnormal with significant ST depression in inferior latera leads without WMAs.      He has a h/o L SFA atherectomy with 2.2 Phoenix catheter with PTA using using 6.0 x 100 mm Lutonix DCB in 6/2015.  He had distal aorta iliac reconstruction in 6/2014 with 8.0 x 39 mm BES overlapping with 9.0 x 60 in R EIA and 8.0 x 80 mm SES in L EIA post dilated with 9.0 mm bilaterally.         ELISABETH with stress 5/2017:              R 1.01 to 0.44              L 0.92 to 0.45               After 6 minutes ambulation        CTA 5/2017:                    Patent distal aorta and bilateral GRUPO/EIA stents              L EIA with de shawna 90% stenosis              L SFA 80% with 3 vessel run off                 R EIA/CFA with moderate disease              R SFA 80% stenosis with 3 vessel run off           Peripheral angiogram with intervention 6/2017         Patent bilateral GRUPO/EIA stents.    De shawna 80% left EIA stenosis treated with 9.0 x 80 mm Lifestar  stent post dilated with 8.0 mm balloon.    Bilateral 70-80% SFA stenosis with 3 vessel run off.           3/2018        L SFA intervention for claudication              75% L SFA with 3 vessel run off              7 mmHg  resting and 33 mmHg hyperemic mean gradient                                L CFA antegrade access              PTA with 6.0 x 150 mm              PTA with 6.0 x 150 mm Lutonix              3 vessel run off               4/13/2018                    S/p R SFA PTA for winsome IIb claudication              R CFA antegrade access                    R CFA with 50% stenosis-heavily calcified lesion                          Baseline /90                    R SFA with 70% stenosis with a significant resting and hyperemic mean gradient               3 vessel run off                        PTA of R SFA with 6.0 x 150 + 6.0 x 60 mm Lutonix DCB           5/2/2018              Patent arteries post revascularization           2/2019                 R 0.84 to 0.27              L 0.90 to 0.66              After ambulation              Severe R calf claudication              Nuclear stress test 2/2019                 + ECG with 2 mm ST depression              No wall motion abnormalities              Test stopped at 6 minutes, 7 METs, 86% predicted heart rate              Stopped because of R leg claudication + ECG changes        Procedure(s) (LRB):  Left heart cath (N/A)  PTCA, Single Vessel  Stent, Drug Eluting, Single Vessel, Coronary  ANGIOGRAM, CORONARY ARTERY (N/A)  Ventriculogram, Left  Ultrasound-coronary (N/A)     Indwelling Lines/Drains at time of discharge:  Lines/Drains/Airways          None          Hospital Course   3/29/2019 Electively admitted for [facility] due to complaints of chest pain. Taken to the cath lab for the procedure via R radial access with following results:        LM, LAD, and LCX are patent with luminal irregularities  RCA mid 95% calcified lesion  Normal EF with LVEDP 8 mmHg     PCI of mid LAD with 2.5 x 30 and 2.5 x 15 mm Resolute REZA  PCI of proximal RCA to treat guide dissection with 3.5 x 22 Resolute REZA     Tolerated procedure well and recovered in pre post area with continuation of DAPT,  statin. Stable with no active bleeding, hematoma or ecchymosis. Deemed ready for discharge and sent home on good medication. Will return for elective forr R SFA revascularization in 4 weeks      Consults: none     Pending Diagnostic Studies:     None          Final Active Diagnoses:    Diagnosis Date Noted POA    PRINCIPAL PROBLEM:  Abnormal cardiovascular stress test [R94.39] 02/27/2019 Yes    Coronary artery disease involving native coronary artery of native heart with angina pectoris with documented spasm [I25.111] 03/29/2019 Yes    Venous insufficiency of both lower extremities [I87.2] 06/04/2018 Yes    Atherosclerosis of native artery of right lower extremity with intermittent claudication [I70.211] 04/13/2018 Yes    Atherosclerosis of native artery of both lower extremities with intermittent claudication [I70.213] 03/02/2018 Yes    Hyperlipidemia LDL goal <70 [E78.5] 06/12/2015 Yes    PAD (peripheral artery disease) [I73.9] 05/21/2014 Yes    Atherosclerosis of leg with intermittent claudication [I70.219] 04/21/2014 Yes    HTN (hypertension) [I10] 04/29/2013 Yes      Problems Resolved During this Admission:       Discharged Condition: good    Follow Up:    Patient Instructions:   No discharge procedures on file.  Medications:  Reconciled Home Medications:      Medication List      ASK your doctor about these medications    albuterol 90 mcg/actuation inhaler  Commonly known as:  PROVENTIL/VENTOLIN HFA  Inhale 2 puffs into the lungs every 6 (six) hours as needed for Wheezing.     ASPIR-81 ORAL  Take 1 tablet by mouth.     atorvastatin 40 MG tablet  Commonly known as:  LIPITOR  Take 1 tablet (40 mg total) by mouth once daily.     cilostazol 100 MG Tab  Commonly known as:  PLETAL  Take 1 tablet (100 mg total) by mouth 2 (two) times daily.     clopidogrel 75 mg tablet  Commonly known as:  PLAVIX  Take 1 tablet (75 mg total) by mouth once daily.     coenzyme Q10 100 mg capsule  Commonly known as:   COQ-10  Take 1 capsule (100 mg total) by mouth once daily.     doxycycline 100 MG tablet  Commonly known as:  VIBRA-TABS  Take 1 tablet (100 mg total) by mouth 2 (two) times daily. for 7 days  Ask about: Should I take this medication?     flunisolide 25 mcg (0.025%) 25 mcg (0.025 %) Spry  Commonly known as:  NASALIDE  2 sprays by Nasal route 2 (two) times daily.     hydroCHLOROthiazide 25 MG tablet  Commonly known as:  HYDRODIURIL  TAKE 1 TABLET(25 MG) BY MOUTH EVERY DAY     losartan 100 MG tablet  Commonly known as:  COZAAR  TAKE 1 TABLET(100 MG) BY MOUTH EVERY DAY     * metoprolol succinate 25 MG 24 hr tablet  Commonly known as:  TOPROL-XL  TAKE 1 TABLET(25 MG) BY MOUTH EVERY DAY     * metoprolol succinate 25 MG 24 hr tablet  Commonly known as:  TOPROL-XL  Take one tablet PO once daily     predniSONE 20 MG tablet  Commonly known as:  DELTASONE  Take 3 pills daily for 4 days, then 2 pills daily for 3 days, then 1 pill daily for 3 days,         * This list has 2 medication(s) that are the same as other medications prescribed for you. Read the directions carefully, and ask your doctor or other care provider to review them with you.                Time spent on the discharge of patient: 45 minutes    BAO Ignacio, ANP  Cardiology  [facility]

## 2019-04-08 DIAGNOSIS — I73.9 PAD (PERIPHERAL ARTERY DISEASE): Primary | ICD-10-CM

## 2019-04-08 RX ORDER — SODIUM CHLORIDE 9 MG/ML
3 INJECTION, SOLUTION INTRAVENOUS CONTINUOUS
Status: CANCELLED | OUTPATIENT
Start: 2019-04-08 | End: 2019-04-08

## 2019-04-10 ENCOUNTER — PATIENT MESSAGE (OUTPATIENT)
Dept: CARDIOLOGY | Facility: HOSPITAL | Age: 59
End: 2019-04-10

## 2019-04-11 DIAGNOSIS — J40 BRONCHITIS: ICD-10-CM

## 2019-04-11 RX ORDER — ALBUTEROL SULFATE 90 UG/1
AEROSOL, METERED RESPIRATORY (INHALATION)
Qty: 18 G | Refills: 0 | Status: SHIPPED | OUTPATIENT
Start: 2019-04-11 | End: 2019-05-11 | Stop reason: SDUPTHER

## 2019-04-14 ENCOUNTER — PATIENT MESSAGE (OUTPATIENT)
Dept: CARDIOLOGY | Facility: HOSPITAL | Age: 59
End: 2019-04-14

## 2019-04-18 ENCOUNTER — PATIENT MESSAGE (OUTPATIENT)
Dept: CARDIOLOGY | Facility: HOSPITAL | Age: 59
End: 2019-04-18

## 2019-04-20 DIAGNOSIS — E78.5 HYPERLIPIDEMIA, UNSPECIFIED HYPERLIPIDEMIA TYPE: ICD-10-CM

## 2019-04-20 DIAGNOSIS — I10 ESSENTIAL HYPERTENSION: ICD-10-CM

## 2019-04-20 RX ORDER — ATORVASTATIN CALCIUM 40 MG/1
TABLET, FILM COATED ORAL
Qty: 30 TABLET | Refills: 0 | Status: SHIPPED | OUTPATIENT
Start: 2019-04-20 | End: 2019-05-24 | Stop reason: SDUPTHER

## 2019-04-20 RX ORDER — LOSARTAN POTASSIUM 100 MG/1
TABLET ORAL
Qty: 30 TABLET | Refills: 0 | Status: SHIPPED | OUTPATIENT
Start: 2019-04-20 | End: 2019-05-20 | Stop reason: SDUPTHER

## 2019-04-20 RX ORDER — HYDROCHLOROTHIAZIDE 25 MG/1
TABLET ORAL
Qty: 30 TABLET | Refills: 0 | Status: SHIPPED | OUTPATIENT
Start: 2019-04-20 | End: 2019-05-20 | Stop reason: SDUPTHER

## 2019-04-20 NOTE — TELEPHONE ENCOUNTER
----- Message from Anlay Encarnacion MD sent at 4/20/2019  9:31 AM CDT -----  It has been one year since I have seen this patient in clinic to address chronic issues (such as HTN). Please try to get him scheduled to come back in. Thanks

## 2019-05-10 ENCOUNTER — HOSPITAL ENCOUNTER (OUTPATIENT)
Facility: HOSPITAL | Age: 59
Discharge: HOME | End: 2019-05-10
Attending: INTERNAL MEDICINE | Admitting: INTERNAL MEDICINE
Payer: COMMERCIAL

## 2019-05-10 ENCOUNTER — TELEPHONE (OUTPATIENT)
Dept: CARDIOLOGY | Facility: CLINIC | Age: 59
End: 2019-05-10

## 2019-05-10 VITALS
DIASTOLIC BLOOD PRESSURE: 79 MMHG | SYSTOLIC BLOOD PRESSURE: 168 MMHG | BODY MASS INDEX: 26.48 KG/M2 | RESPIRATION RATE: 14 BRPM | HEIGHT: 70 IN | WEIGHT: 185 LBS | TEMPERATURE: 98 F | OXYGEN SATURATION: 100 % | HEART RATE: 62 BPM

## 2019-05-10 DIAGNOSIS — Z12.11 COLON CANCER SCREENING: ICD-10-CM

## 2019-05-10 DIAGNOSIS — I70.211 ATHEROSCLEROSIS OF NATIVE ARTERY OF RIGHT LOWER EXTREMITY WITH INTERMITTENT CLAUDICATION: Primary | ICD-10-CM

## 2019-05-10 DIAGNOSIS — I73.9 PAD (PERIPHERAL ARTERY DISEASE): ICD-10-CM

## 2019-05-10 DIAGNOSIS — Z01.810 PREOP CARDIOVASCULAR EXAM: ICD-10-CM

## 2019-05-10 LAB
ANION GAP SERPL CALC-SCNC: 5 MMOL/L (ref 8–16)
BASOPHILS # BLD AUTO: 0.05 K/UL (ref 0–0.2)
BASOPHILS NFR BLD: 0.9 % (ref 0–1.9)
BUN SERPL-MCNC: 24 MG/DL (ref 6–20)
CALCIUM SERPL-MCNC: 8.8 MG/DL (ref 8.7–10.5)
CHLORIDE SERPL-SCNC: 103 MMOL/L (ref 95–110)
CO2 SERPL-SCNC: 30 MMOL/L (ref 23–29)
CREAT SERPL-MCNC: 1.2 MG/DL (ref 0.5–1.4)
DIFFERENTIAL METHOD: ABNORMAL
EOSINOPHIL # BLD AUTO: 0.4 K/UL (ref 0–0.5)
EOSINOPHIL NFR BLD: 6.7 % (ref 0–8)
ERYTHROCYTE [DISTWIDTH] IN BLOOD BY AUTOMATED COUNT: 12.6 % (ref 11.5–14.5)
EST. GFR  (AFRICAN AMERICAN): >60 ML/MIN/1.73 M^2
EST. GFR  (NON AFRICAN AMERICAN): >60 ML/MIN/1.73 M^2
GLUCOSE SERPL-MCNC: 107 MG/DL (ref 70–110)
HCT VFR BLD AUTO: 29.6 % (ref 40–54)
HGB BLD-MCNC: 10.2 G/DL (ref 14–18)
LYMPHOCYTES # BLD AUTO: 1.4 K/UL (ref 1–4.8)
LYMPHOCYTES NFR BLD: 25.4 % (ref 18–48)
MCH RBC QN AUTO: 27.6 PG (ref 27–31)
MCHC RBC AUTO-ENTMCNC: 34.5 G/DL (ref 32–36)
MCV RBC AUTO: 80 FL (ref 82–98)
MONOCYTES # BLD AUTO: 0.3 K/UL (ref 0.3–1)
MONOCYTES NFR BLD: 5.8 % (ref 4–15)
NEUTROPHILS # BLD AUTO: 3.3 K/UL (ref 1.8–7.7)
NEUTROPHILS NFR BLD: 61.2 % (ref 38–73)
PLATELET # BLD AUTO: 248 K/UL (ref 150–350)
PMV BLD AUTO: 8.9 FL (ref 9.2–12.9)
POC ACTIVATED CLOTTING TIME K: 197 SEC (ref 74–137)
POTASSIUM SERPL-SCNC: 3 MMOL/L (ref 3.5–5.1)
RBC # BLD AUTO: 3.7 M/UL (ref 4.6–6.2)
SAMPLE: ABNORMAL
SODIUM SERPL-SCNC: 138 MMOL/L (ref 136–145)
WBC # BLD AUTO: 5.39 K/UL (ref 3.9–12.7)

## 2019-05-10 PROCEDURE — 36140 INTRO NDL ICATH UPR/LXTR ART: CPT | Mod: RT,,, | Performed by: INTERNAL MEDICINE

## 2019-05-10 PROCEDURE — 75710 ARTERY X-RAYS ARM/LEG: CPT | Mod: 26 | Performed by: INTERNAL MEDICINE

## 2019-05-10 PROCEDURE — 99152 PR MOD CONSCIOUS SEDATION, SAME PHYS, 5+ YRS, FIRST 15 MIN: ICD-10-PCS | Mod: ,,, | Performed by: INTERNAL MEDICINE

## 2019-05-10 PROCEDURE — 99152 MOD SED SAME PHYS/QHP 5/>YRS: CPT | Mod: ,,, | Performed by: INTERNAL MEDICINE

## 2019-05-10 PROCEDURE — 93005 ELECTROCARDIOGRAM TRACING: CPT

## 2019-05-10 PROCEDURE — 27201423 OPTIME MED/SURG SUP & DEVICES STERILE SUPPLY: Performed by: INTERNAL MEDICINE

## 2019-05-10 PROCEDURE — 99152 MOD SED SAME PHYS/QHP 5/>YRS: CPT | Performed by: INTERNAL MEDICINE

## 2019-05-10 PROCEDURE — 99153 MOD SED SAME PHYS/QHP EA: CPT | Performed by: INTERNAL MEDICINE

## 2019-05-10 PROCEDURE — 63600175 PHARM REV CODE 636 W HCPCS: Performed by: INTERNAL MEDICINE

## 2019-05-10 PROCEDURE — 25000003 PHARM REV CODE 250: Performed by: INTERNAL MEDICINE

## 2019-05-10 PROCEDURE — C1894 INTRO/SHEATH, NON-LASER: HCPCS | Performed by: INTERNAL MEDICINE

## 2019-05-10 PROCEDURE — 80048 BASIC METABOLIC PNL TOTAL CA: CPT

## 2019-05-10 PROCEDURE — C1887 CATHETER, GUIDING: HCPCS | Performed by: INTERNAL MEDICINE

## 2019-05-10 PROCEDURE — 75710 PR  ANGIO EXTREMITY UNILAT: ICD-10-PCS | Mod: 26,,, | Performed by: INTERNAL MEDICINE

## 2019-05-10 PROCEDURE — 25500020 PHARM REV CODE 255: Performed by: INTERNAL MEDICINE

## 2019-05-10 PROCEDURE — 36140 PR PLACE CATH EXTREM ARTERY: ICD-10-PCS | Mod: RT,,, | Performed by: INTERNAL MEDICINE

## 2019-05-10 PROCEDURE — 85347 COAGULATION TIME ACTIVATED: CPT | Performed by: INTERNAL MEDICINE

## 2019-05-10 PROCEDURE — C1769 GUIDE WIRE: HCPCS | Performed by: INTERNAL MEDICINE

## 2019-05-10 PROCEDURE — 25000003 PHARM REV CODE 250: Performed by: NURSE PRACTITIONER

## 2019-05-10 PROCEDURE — 36415 COLL VENOUS BLD VENIPUNCTURE: CPT

## 2019-05-10 PROCEDURE — 36140 INTRO NDL ICATH UPR/LXTR ART: CPT | Mod: RT | Performed by: INTERNAL MEDICINE

## 2019-05-10 PROCEDURE — 85025 COMPLETE CBC W/AUTO DIFF WBC: CPT

## 2019-05-10 PROCEDURE — 75710 ARTERY X-RAYS ARM/LEG: CPT | Mod: 26,,, | Performed by: INTERNAL MEDICINE

## 2019-05-10 RX ORDER — SODIUM CHLORIDE 9 MG/ML
INJECTION, SOLUTION INTRAVENOUS
Status: DISCONTINUED | OUTPATIENT
Start: 2019-05-10 | End: 2019-05-10 | Stop reason: HOSPADM

## 2019-05-10 RX ORDER — LIDOCAINE HYDROCHLORIDE 10 MG/ML
INJECTION INFILTRATION; PERINEURAL
Status: DISCONTINUED | OUTPATIENT
Start: 2019-05-10 | End: 2019-05-10 | Stop reason: HOSPADM

## 2019-05-10 RX ORDER — HEPARIN SODIUM 200 [USP'U]/100ML
INJECTION, SOLUTION INTRAVENOUS
Status: DISCONTINUED | OUTPATIENT
Start: 2019-05-10 | End: 2019-05-10 | Stop reason: HOSPADM

## 2019-05-10 RX ORDER — DIPHENHYDRAMINE HYDROCHLORIDE 50 MG/ML
INJECTION INTRAMUSCULAR; INTRAVENOUS
Status: DISCONTINUED | OUTPATIENT
Start: 2019-05-10 | End: 2019-05-10 | Stop reason: HOSPADM

## 2019-05-10 RX ORDER — MIDAZOLAM HYDROCHLORIDE 1 MG/ML
INJECTION, SOLUTION INTRAMUSCULAR; INTRAVENOUS
Status: DISCONTINUED | OUTPATIENT
Start: 2019-05-10 | End: 2019-05-10 | Stop reason: HOSPADM

## 2019-05-10 RX ORDER — FENTANYL CITRATE 50 UG/ML
INJECTION, SOLUTION INTRAMUSCULAR; INTRAVENOUS
Status: DISCONTINUED | OUTPATIENT
Start: 2019-05-10 | End: 2019-05-10 | Stop reason: HOSPADM

## 2019-05-10 RX ORDER — SODIUM CHLORIDE 9 MG/ML
3 INJECTION, SOLUTION INTRAVENOUS CONTINUOUS
Status: ACTIVE | OUTPATIENT
Start: 2019-05-10 | End: 2019-05-10

## 2019-05-10 RX ORDER — IODIXANOL 320 MG/ML
INJECTION, SOLUTION INTRAVASCULAR
Status: DISCONTINUED | OUTPATIENT
Start: 2019-05-10 | End: 2019-05-10 | Stop reason: HOSPADM

## 2019-05-10 RX ORDER — VERAPAMIL HYDROCHLORIDE 2.5 MG/ML
INJECTION, SOLUTION INTRAVENOUS
Status: DISCONTINUED | OUTPATIENT
Start: 2019-05-10 | End: 2019-05-10 | Stop reason: HOSPADM

## 2019-05-10 RX ORDER — HEPARIN SODIUM 1000 [USP'U]/ML
INJECTION, SOLUTION INTRAVENOUS; SUBCUTANEOUS
Status: DISCONTINUED | OUTPATIENT
Start: 2019-05-10 | End: 2019-05-10 | Stop reason: HOSPADM

## 2019-05-10 RX ADMIN — SODIUM CHLORIDE 3 ML/KG/HR: 0.9 INJECTION, SOLUTION INTRAVENOUS at 06:05

## 2019-05-10 NOTE — DISCHARGE INSTRUCTIONS
Discharge Instructions:    Do not drive a car, operate heavy equipment, care for a young child, etc for the next 12-24 hours.   Avoid drinking alcohol for 24 hours.  Do not make any important decisions for 24 hours.    Drink fluids to keep hydrated. Resume your usual diet as tolerated.     Rest for today then activity as tolerated.   Do not lift anything over 5 pounds for the first 3 days after procedure.    Remove dressings tomorrow then may shower with warm soapy water. Do not scrub sites. Pat dry.   May apply bandaids for 2 days.  No tub baths.  Do not submerge wounds in water for 3 days.     Call MD for any unrelieved pain, excessive nausea or vomiting, redness around site, bleeding, or pus or foul smelling drainage, or any other questions or concerns.    Go to the ER for any difficulty breathing or chest pain.      If either or both sites swells or bleeds, hold direct pressure to area for 10 full minutes.   If either or both sites continues to bleed, continue to hold pressure to site and have someone bring you to the ER.      Follow any additional instructions given to you by MD.      Call MD to schedule a follow up appointment, or follow up as instructed.

## 2019-05-10 NOTE — PLAN OF CARE
12:30 Remaining air removed from left radial vasc band. Gauze and tegaderm dressing applied c.d.i.   No drainage or shadowing noted. No bleeding or hematoma noted at or around site. +2 mag radial pulses palpated. Skin normal in color, warm to touch, < 3 sec cap refill.   Pt tolerated well.  Will continue to monitor pt.

## 2019-05-10 NOTE — Clinical Note
The catheter is inserted to the  proximal right common femoral artery. is inserted in to the Unilateral runoff performed .

## 2019-05-10 NOTE — BRIEF OP NOTE
S/p selective R leg angiogram      Micropuncture R SFA to assess infra inguinal flow   L radial to assess CFA          95% R CFA stenosis  Patent SFA, POP + 3 vessel run off        Plan:    Referral to vascular surgery

## 2019-05-10 NOTE — H&P
Patient ID: Domingo Gross Sr. is a 58 y.o. male who presents for follow up of Peripheral Arterial Disease; Claudication; Hyperlipidemia; Hypertension; and Abnormal Stress Test     HPI:   Domingo Gross Sr. 58 y.o. male is here follow up PAD with recurrent winsome IIb claudication after bilateral SFA revascularization R (3/2018) and L (4/2018)- PTA with 6.0 mm Lutonix DCB. In 6/2017 he had L EIA PTAS for winsome IIb claudication. No ulcers. He is on aspirin and plavix for DAPT. He has R calf claudication. No ulcers. He is also complaining of chest pain at rest but never with exertion. Stress test was abnormal with significant ST depression in inferior latera leads without WMAs.   He has a h/o L SFA atherectomy with 2.2 Phoenix catheter with PTA using using 6.0 x 100 mm Lutonix DCB in 6/2015. He had distal aorta iliac reconstruction in 6/2014 with 8.0 x 39 mm BES overlapping with 9.0 x 60 in R EIA and 8.0 x 80 mm SES in L EIA post dilated with 9.0 mm bilaterally.   ELISABETH with stress 5/2017:   R 1.01 to 0.44   L 0.92 to 0.45   After 6 minutes ambulation   CTA 5/2017:   Patent distal aorta and bilateral GRUPO/EIA stents   L EIA with de shawna 90% stenosis   L SFA 80% with 3 vessel run off   R EIA/CFA with moderate disease   R SFA 80% stenosis with 3 vessel run off   Peripheral angiogram with intervention 6/2017       Patent bilateral GRUPO/EIA stents.  De shawna 80% left EIA stenosis treated with 9.0 x 80 mm Lifestar stent post dilated with 8.0 mm balloon.  Bilateral 70-80% SFA stenosis with 3 vessel run off.   3/2018   L SFA intervention for claudication   75% L SFA with 3 vessel run off   7 mmHg resting and 33 mmHg hyperemic mean gradient   L CFA antegrade access   PTA with 6.0 x 150 mm   PTA with 6.0 x 150 mm Lutonix   3 vessel run off   4/13/2018   S/p R SFA PTA for winsome IIb claudication   R CFA antegrade access   R CFA with 50% stenosis-heavily calcified lesion   Baseline /90   R SFA with 70%  stenosis with a significant resting and hyperemic mean gradient   3 vessel run off   PTA of R SFA with 6.0 x 150 + 6.0 x 60 mm Lutonix DCB   5/2/2018   Patent arteries post revascularization   2/2019   R 0.84 to 0.27   L 0.90 to 0.66   After ambulation   Severe R calf claudication   Nuclear stress test 2/2019   + ECG with 2 mm ST depression   No wall motion abnormalities   Test stopped at 6 minutes, 7 METs, 86% predicted heart rate   Stopped because of R leg claudication + ECG changes         Patient Active Problem List    Diagnosis Date Noted    Atherosclerosis of native artery of right lower extremity with intermittent claudication 04/13/2018     Priority: High    Atherosclerosis of native artery of both lower extremities with intermittent claudication 03/02/2018     Priority: High    Abnormal cardiovascular stress test 02/27/2019     Nuclear stress test 2/2019   + ECG with 2 mm ST depression   No wall motion abnormalities   Test stopped at 6 minutes, 7 METs, 86% predicted heart rate   Stopped because of R leg claudication + ECG changes     Venous insufficiency of both lower extremities 06/04/2018    Localized edema 06/04/2018    Left knee pain 01/19/2016    Pain of left lower extremity 01/19/2016    Difficulty walking 01/19/2016    Acute pain of left knee 12/11/2015    Hyperlipidemia LDL goal <70 06/12/2015    DJD (degenerative joint disease), lumbar 05/27/2015    Lumbar facet arthropathy 05/27/2015    DDD (degenerative disc disease) 05/27/2015    Spondylosis without myelopathy 05/27/2015    Limb pain 11/21/2014    History of back injury 11/21/2014     20 years ago a bag fell on his back at work     Myalgia 11/21/2014    Claudication in peripheral vascular disease 06/13/2014    PAD (peripheral artery disease) 05/21/2014 6/13/2014       1. Three vessel runoff below the knee bilaterally.  2. Severe disease of the terminal aorta.  3. Successful PTAS.  4. Bilateral common iliac reconstruction  with 8 x 39 mm stent extending in the aorta  5. Right common illiac was treated with an overlapping 9 x 60 mm SES   6. Left common iliac was treated with an overlapping 8 x 80 mm SES down to external iliac  7. All stents were post dilated with 9.0 x 60 mm balloons  8. Moderate bilateral SFA disease  9. Chronically occluded left internal iliac artery  2015   1. 80% mid left SFA with a 20 mmHg resting mean gradient  2. Atherectomy with 2.2 Phonenix Volcano catheter  3. PTA with 6.0 x 100 mm Lutonix drug coated balloon   2017   Patent bilateral GRUPO/EIA stents   L EIA PTAS 9.0 x 80 mm Life stent post dilated with 8.0 mm balloon   Bilateral 70-80% SFA stenosis with 3 vessel run off   3/2018   L SFA intervention for claudication   75% L SFA with 3 vessel run off   7 mmHg resting and 33 mmHg hyperemic mean gradient   L CFA antegrade access   PTA with 6.0 x 150 mm   PTA with 6.0 x 150 mm Lutonix   3 vessel run off   2018   S/p R SFA PTA for winsome IIb claudication   R CFA antegrade access   R CFA with 50% stenosis-heavily calcified lesion   Baseline /90   R SFA with 70% stenosis with a significant resting and hyperemic mean gradient   3 vessel run off   PTA of R SFA with 6.0 x 150 + 6.0 x 60 mm Lutonix DCB   2018   Patent arteries post revascularization   2019   R 0.84 to 0.27   L 0.90 to 0.66   After ambulation   Severe R calf claudication     Atherosclerosis of leg with intermittent claudication 2014     Winsome IIB     Elevated TSH 2013    HTN (hypertension) 2013    Elevated fasting blood sugar 2013     Right Arm BP - Sittin/78   Left Arm BP - Sittin/78   LAST HbA1c         Lab Results   Component Value Date    HGBA1C 5.8 2013     Lipid panel         Lab Results   Component Value Date    CHOL 183 10/07/2017    CHOL 166 2017    CHOL 118 (L) 2015           Lab Results   Component Value Date    HDL 51 10/07/2017    HDL 50 2017     HDL 41 06/12/2015           Lab Results   Component Value Date    LDLCALC 107.0 10/07/2017    LDLCALC 88.6 01/07/2017    LDLCALC 54.2 (L) 06/12/2015           Lab Results   Component Value Date    TRIG 125 10/07/2017    TRIG 137 01/07/2017    TRIG 114 06/12/2015           Lab Results   Component Value Date    CHOLHDL 27.9 10/07/2017    CHOLHDL 30.1 01/07/2017    CHOLHDL 34.7 06/12/2015     Review of Systems   Constitution: Negative for diaphoresis, weakness, night sweats, weight gain and weight loss.   HENT: Negative for congestion.   Eyes: Negative for blurred vision, discharge and double vision.   Cardiovascular: Negative for chest pain, claudication, cyanosis, dyspnea on exertion, irregular heartbeat, leg swelling, near-syncope, orthopnea, palpitations, paroxysmal nocturnal dyspnea and syncope.   Respiratory: Negative for cough, shortness of breath and wheezing.   Endocrine: Negative for cold intolerance, heat intolerance and polyphagia.   Hematologic/Lymphatic: Negative for adenopathy and bleeding problem. Does not bruise/bleed easily.   Skin: Negative for dry skin and nail changes.   Musculoskeletal: Negative for arthritis, back pain, falls, joint pain, myalgias and neck pain.   Gastrointestinal: Negative for bloating, abdominal pain, change in bowel habit and constipation.   Genitourinary: Negative for bladder incontinence, dysuria, flank pain, genital sores and missed menses.   Neurological: Negative for aphonia, brief paralysis, difficulty with concentration and dizziness.   Psychiatric/Behavioral: Negative for altered mental status and memory loss. The patient does not have insomnia.   Allergic/Immunologic: Negative for environmental allergies.     Objective:   Physical Exam   Constitutional: He is oriented to person, place, and time. He appears well-developed and well-nourished. He is not intubated.   HENT:   Head: Normocephalic and atraumatic.   Right Ear: External ear normal.   Left Ear: External ear  normal.   Mouth/Throat: Oropharynx is clear and moist.   Eyes: Conjunctivae and EOM are normal. Pupils are equal, round, and reactive to light. Right eye exhibits no discharge. Left eye exhibits no discharge. No scleral icterus.   Neck: Normal range of motion. Neck supple. Normal carotid pulses, no hepatojugular reflux and no JVD present. Carotid bruit is not present. No tracheal deviation present. No thyromegaly present.   Cardiovascular: Normal rate, regular rhythm, S1 normal and S2 normal. No extrasystoles are present. PMI is not displaced. Exam reveals no gallop, no S3, no distant heart sounds, no friction rub and no midsystolic click.   No murmur heard.   Pulses:   Carotid pulses are 2+ on the right side, and 2+ on the left side.   Radial pulses are 2+ on the right side, and 2+ on the left side.   Femoral pulses are 2+ on the right side, and 2+ on the left side.   Popliteal pulses are 2+ on the right side, and 2+ on the left side.   Dorsalis pedis pulses are 1+ on the right side, and 2+ on the left side.   Posterior tibial pulses are 1+ on the right side, and 2+ on the left side.     Biphasic R DP and PT doppler signals      Triphasic L DP and PT doppler signals                  Pulmonary/Chest: Effort normal and breath sounds normal. No accessory muscle usage or stridor. No apnea, no tachypnea and no bradypnea. He is not intubated. No respiratory distress. He has no decreased breath sounds. He has no wheezes. He has no rales. He exhibits no tenderness and no bony tenderness.   Abdominal: He exhibits no distension, no pulsatile liver, no abdominal bruit, no ascites, no pulsatile midline mass and no mass. There is no tenderness. There is no rebound and no guarding.   Musculoskeletal: Normal range of motion. He exhibits no edema or tenderness.   Lymphadenopathy:   He has no cervical adenopathy.   Neurological: He is alert and oriented to person, place, and time. He has normal reflexes. No cranial nerve deficit.  Coordination normal.   Skin: Skin is warm. No rash noted. No erythema. No pallor.   Psychiatric: He has a normal mood and affect. His behavior is normal. Judgment and thought content normal.     Assessment:     1. Abnormal cardiovascular stress test    2. PAD (peripheral artery disease)    3. Essential hypertension    4. Atherosclerosis of native artery of right lower extremity with intermittent claudication    5. Hyperlipidemia LDL goal <70    6. Angina, class II      Plan:   LHC via R radial   Even though CP is not occurring with exertion   He has extensive PAD + significant ST depression   Test was terminated because of severe R calf claudication   REZA candidate   He will also have R SFA revascularization   Supera stent this time (this will be his third intervention)   R CFA antegrade access with closure using Vascade'   Compression stockings   20-30 mmHg   Venous insufficiency   If edema persists he will have a venous reflux ultrasound   Continue with current medical plan and lifestyle changes.   Return sooner for concerns or questions. If symptoms persist go to the ED   I have reviewed all pertinent data on this patient   I have reviewed the patient's medical history in detail and updated the computerized patient record.         Orders Placed This Encounter   Procedures    Case Request-Cath Lab: PTA, PERIPHERAL VESSEL     Standing Status:  Standing     Number of Occurrences:  1     Order Specific Question:  CPT Code:     Answer:  DC FEM/POPL REVAS W/ATHERECTOMY [25097]     Order Specific Question:  Medical Necessity:     Answer:  Medically Urgent [101]    Case Request-Cath Lab: Left heart cath     Standing Status:  Standing     Number of Occurrences:  1     Order Specific Question:  CPT Code:     Answer:  DC LEFT HEART CATH INJECT VETRICULOGRAPHY, IMAGE SUPERVISE/INTERP [20270]     Order Specific Question:  Medical Necessity:     Answer:  Medically Urgent [101]     Follow up as scheduled. Return sooner for  concerns or questions   He expressed verbal understanding and agreed with the plan   Greater than 50% of the visit of 45 minutes was spent counseling, educating, and coordinating the care of the patient.        Patient's Medications   New Prescriptions    No medications on file   Previous Medications    ASPIRIN (ASPIR-81 ORAL) Take 1 tablet by mouth.    ATORVASTATIN (LIPITOR) 40 MG TABLET Take 1 tablet (40 mg total) by mouth once daily.    CILOSTAZOL (PLETAL) 100 MG TAB Take 1 tablet (100 mg total) by mouth 2 (two) times daily.    CLOPIDOGREL (PLAVIX) 75 MG TABLET Take 1 tablet (75 mg total) by mouth once daily.    COENZYME Q10 (COQ-10) 100 MG CAPSULE Take 1 capsule (100 mg total) by mouth once daily.    HYDROCHLOROTHIAZIDE (HYDRODIURIL) 25 MG TABLET TAKE 1 TABLET(25 MG) BY MOUTH EVERY DAY    LOSARTAN (COZAAR) 100 MG TABLET TAKE 1 TABLET(100 MG) BY MOUTH EVERY DAY    METOPROLOL SUCCINATE (TOPROL-XL) 25 MG 24 HR TABLET TAKE 1 TABLET(25 MG) BY MOUTH EVERY DAY    METOPROLOL SUCCINATE (TOPROL-XL) 25 MG 24 HR TABLET Take one tablet PO once daily   Modified Medications    No medications on file   Discontinued Medications    No medications on file           He already had a [facility] with RCA PCI      He is here today for R leg angiogram with possible intervention      Risks, benefits and alternatives of peripheral catheterization and possible intervention were discussed with the patient. All questions were answered and informed consent obtained.     I discussed the importance of compliance with dual antiplatelet therapy with the patient to prevent acute or late stent thrombosis with premature discontinuation of the therapy.        R CFA antegrade access  PTA with DCB +/- stent

## 2019-05-10 NOTE — TELEPHONE ENCOUNTER
Garcia Tate       Can you please make sure the referral to vascular surgery is set up for him      Thanks      ZN

## 2019-05-10 NOTE — PLAN OF CARE
09:30 Patient transferred to recovery cath lab slot 5 via stretcher with side rails up x2 .  Pt drowsy but able to follow commands. Pt is stable when connecting to cardiac monitors.  VSS. Left radial vasc band in place with 11ml of air in band c.d.i. no bleeding or hemartoma noted. +2 mag radial pulses palpated.  right groin micropuncture removed, pressure applied using sterile techniqu for 15 min, sterile gauze/tegaderm CDI.  No bleeding or hematoma noted. Site is soft. Small bruising noted near site.  dopplered mag pedal pulses.  Skin warm to touch, < 3  sec cap refill.  Fall risk precautions given and patient acknowledges.  AIDET completed to pt.  Will continue to monitor patient.  Updated pt's spouse in sx waiting room.

## 2019-05-10 NOTE — Clinical Note
The catheter is inserted to the  proximal right common femoral artery. is inserted in to the Angiography perform common femoral.

## 2019-05-10 NOTE — PLAN OF CARE
10:15 2 mL of air removed from radial vasc band.  No hematoma or bleeding noted.  +2 mag radial pulses palpated. Skin normal in color, warm to touch, < 3 sec cap refill.   Will continue to monitor pt.

## 2019-05-10 NOTE — PLAN OF CARE
13:55 Dr. Jenkins is at patient's bedside speaking to pt. Work excuse note given to pt as requested.  Patient discharged to home as ordered.  All discharge instructions, printed materials given.    Patient instructed on follow-up appointment as ordered.  Patient verbalized understanding and agreement with all discharge instructions given.   Pt is AAOx3, VSS, denies any pain.    Right groin gauze/tegaderm CDI. No bleeding, hematoma, or shadowing noted.  Left radial gauze/tegaderm dressing CDI. No bleeding, hematoma or shadowing noted.  Skin normal in color and warm to touch. Palpated bilateral radial pulses and dopplered bilateral pedal pulses.  Pt voided without difficulty 400ml clear yellow urine per urinal. Pt tolerating orange juice. No n/v. ate 100% of his ordered lunch. No n/v. Pt got dressed and moving around without difficulty and no distress noted. No changes to either sites after walking around room and getting dressed.  Pt in w/c.  Right AC 20 gauge iv dc'd as ordered with tip intact. aydin and kobbooker dressing applied c.d.i.  Pt discharged home via wheelchair to taxi with wife at side.

## 2019-05-11 DIAGNOSIS — J40 BRONCHITIS: ICD-10-CM

## 2019-05-13 DIAGNOSIS — I70.213 ATHEROSCLEROSIS OF NATIVE ARTERY OF BOTH LOWER EXTREMITIES WITH INTERMITTENT CLAUDICATION: Primary | ICD-10-CM

## 2019-05-13 RX ORDER — ALBUTEROL SULFATE 90 UG/1
AEROSOL, METERED RESPIRATORY (INHALATION)
Qty: 18 G | Refills: 0 | Status: SHIPPED | OUTPATIENT
Start: 2019-05-13 | End: 2021-06-17 | Stop reason: SDUPTHER

## 2019-05-20 DIAGNOSIS — I10 ESSENTIAL HYPERTENSION: ICD-10-CM

## 2019-05-20 RX ORDER — HYDROCHLOROTHIAZIDE 25 MG/1
TABLET ORAL
Qty: 30 TABLET | Refills: 0 | Status: SHIPPED | OUTPATIENT
Start: 2019-05-20 | End: 2019-06-18 | Stop reason: SDUPTHER

## 2019-05-20 RX ORDER — LOSARTAN POTASSIUM 100 MG/1
TABLET ORAL
Qty: 30 TABLET | Refills: 0 | Status: SHIPPED | OUTPATIENT
Start: 2019-05-20 | End: 2019-06-18 | Stop reason: SDUPTHER

## 2019-05-21 ENCOUNTER — HOSPITAL ENCOUNTER (OUTPATIENT)
Dept: RADIOLOGY | Facility: HOSPITAL | Age: 59
Discharge: HOME | End: 2019-05-21
Attending: SURGERY
Payer: COMMERCIAL

## 2019-05-21 DIAGNOSIS — I70.213 ATHEROSCLEROSIS OF NATIVE ARTERY OF BOTH LOWER EXTREMITIES WITH INTERMITTENT CLAUDICATION: ICD-10-CM

## 2019-05-21 PROCEDURE — 75635 CT ANGIO ABDOMINAL ARTERIES: CPT | Mod: 26,,, | Performed by: RADIOLOGY

## 2019-05-21 PROCEDURE — 75635 CTA RUNOFF ABD PEL BILAT LOWER EXT: ICD-10-PCS | Mod: 26,,, | Performed by: RADIOLOGY

## 2019-05-21 PROCEDURE — 25500020 PHARM REV CODE 255: Performed by: SURGERY

## 2019-05-21 PROCEDURE — 75635 CT ANGIO ABDOMINAL ARTERIES: CPT | Mod: TC

## 2019-05-21 RX ADMIN — IOHEXOL 100 ML: 350 INJECTION, SOLUTION INTRAVENOUS at 06:05

## 2019-05-22 ENCOUNTER — OFFICE VISIT (OUTPATIENT)
Dept: VASCULAR SURGERY | Facility: CLINIC | Age: 59
End: 2019-05-22
Attending: SURGERY
Payer: COMMERCIAL

## 2019-05-22 VITALS
BODY MASS INDEX: 26.45 KG/M2 | HEIGHT: 70 IN | DIASTOLIC BLOOD PRESSURE: 73 MMHG | SYSTOLIC BLOOD PRESSURE: 139 MMHG | WEIGHT: 184.75 LBS | TEMPERATURE: 99 F | HEART RATE: 75 BPM

## 2019-05-22 DIAGNOSIS — I70.201 FEMORAL ARTERY STENOSIS, RIGHT: Primary | ICD-10-CM

## 2019-05-22 DIAGNOSIS — I73.9 CLAUDICATION IN PERIPHERAL VASCULAR DISEASE: ICD-10-CM

## 2019-05-22 DIAGNOSIS — I65.23 CAROTID STENOSIS, ASYMPTOMATIC, BILATERAL: ICD-10-CM

## 2019-05-22 PROCEDURE — 99999 PR PBB SHADOW E&M-EST. PATIENT-LVL IV: CPT | Mod: PBBFAC,,, | Performed by: SURGERY

## 2019-05-22 PROCEDURE — 99244 PR OFFICE CONSULTATION,LEVEL IV: ICD-10-PCS | Mod: S$GLB,,, | Performed by: SURGERY

## 2019-05-22 PROCEDURE — 99244 OFF/OP CNSLTJ NEW/EST MOD 40: CPT | Mod: S$GLB,,, | Performed by: SURGERY

## 2019-05-22 PROCEDURE — 99999 PR PBB SHADOW E&M-EST. PATIENT-LVL IV: ICD-10-PCS | Mod: PBBFAC,,, | Performed by: SURGERY

## 2019-05-22 NOTE — LETTER
May 23, 2019      Keo Jenkins MD  [address] [place], [place] [address]  [address]  [facility] [address]           [facility] - Vascular Surgery  [address]  [place] [address]  Phone: [phone]  Fax: [phone]          Patient: Domingo Gross Sr.   MR Number: [id number]   YOB: 1960   Date of Visit: 5/22/2019       Dear Dr. Keo Jenkins:    Thank you for referring Domingo Gross to me for evaluation. Attached you will find relevant portions of my assessment and plan of care.    If you have questions, please do not hesitate to call me. I look forward to following Domingo Gross along with you.    Sincerely,    Mark Giron MD    Enclosure  CC:  No Recipients    If you would like to receive this communication electronically, please contact [email or web address] or [phone] to request more information on [employer] access.    For providers and/or their staff who would like to refer a patient to [facility], please contact us through our one-stop-shop provider referral line, [facility], at [phone].    If you feel you have received this communication in error or would no longer like to receive these types of communications, please e-mail [email or web address]

## 2019-05-23 NOTE — PROGRESS NOTES
Domingo Gross Sr.  05/23/2019    HPI:  Patient is a 58 y.o. male who is here today for evaluation of severe, lifestyle limiting claudication of the RLE.  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  He has undergone extensive endovascular revascularization of both lower extremities with Dr. Jenkins, including, per Dr. Jenkins's notes:    Bilateral SFA revascularization R (antegrade approach) (3/2018) and L (4/2018)- PTA with 6.0 mm Lutonix DCB. In 6/2017 he had L EIA PTAS for winsome IIb claudication. L SFA atherectomy with 2.2 Phoenix catheter with PTA using using 6.0 x 100 mm Lutonix DCB in 6/2015. He had distal aorta iliac reconstruction in 6/2014 with 8.0 x 39 mm BES overlapping with 9.0 x 60 in R EIA and 8.0 x 80 mm SES in L EIA post dilated with 9.0 mm bilaterally. No ulcers.     He also underwent LHC with PCI of mid LAD with 2.5 x 30 and 2.5 x 15 mm Resolute REZA PCI of proximal RCA to treat guide dissection with 3.5 x 22 Resolute REZA in 3/2019 and is on aspirin and plavix for DAPT.     Recent angiogram and CTA show 95+% stenosis of R CFA and he presents for surgical evaluation.     Past Medical History:   Diagnosis Date    Allergy     Arthritis     Coronary artery disease     Hemothorax     Hyperlipidemia     Hypertension     PAD (peripheral artery disease)      Past Surgical History:   Procedure Laterality Date    ANGIOGRAM, CORONARY ARTERY N/A 3/29/2019    [facility] Jenkins [facility] C[facility] LAB[facility]EP    AORTOGRAM-ABDOMINAL N/A 5/10/2019    [facility] at [facility] CATH LAB/EP    CARDIAC CATHETERIZATION      EYE SURGERY      Left heart cath N/A 3/29/2019    [facility] at [facility] CATH LAB/EP    PTCA, Single Vessel  3/29/2019    [facility] Jenkins [facility] at [facility] CATH LAB/EP    Stent, Drug Eluting, Single Vessel, Coronary  3/29/2019    [facility] Jenkins [facility] at [facility] CATH  LAB/EP    Ultrasound-coronary N/A 3/29/2019    [facility]formed [facility] CATH LAB/EP    Ventriculogram, Left  3/29/2019    Performed by [facility]o Jenkins [facility] at [facility] CATH LAB/EP     Family History   Problem Relation Age of Onset    Cancer Father      Social History     Socioeconomic History    Marital status:      Spouse name: Not on file    Number of children: Not on file    Years of education: Not on file    Highest education level: Not on file   Occupational History     Employer: [employer]   Social Needs    Financial resource strain: Not on file    Food insecurity:     Worry: Not on file     Inability: Not on file    Transportation needs:     Medical: Not on file     Non-medical: Not on file   Tobacco Use    Smoking status: Former Smoker     Types: Cigarettes     Last attempt to quit: 1999     Years since quittin.1    Smokeless tobacco: Never Used   Substance and Sexual Activity    Alcohol use: No     Alcohol/week: 0.0 oz    Drug use: No    Sexual activity: Not on file   Lifestyle    Physical activity:     Days per week: Not on file     Minutes per session: Not on file    Stress: Not on file   Relationships    Social connections:     Talks on phone: Not on file     Gets together: Not on file     Attends Advent service: Not on file     Active member of club or organization: Not on file     Attends meetings of clubs or organizations: Not on file     Relationship status: Not on file   Other Topics Concern    Not on file   Social History Narrative    Not on file     Current Outpatient Medications on File Prior to Visit   Medication Sig    albuterol (PROVENTIL/VENTOLIN HFA) 90 mcg/actuation inhaler INHALE 2 PUFFS INTO THE LUNGS EVERY 6 HOURS AS NEEDED FOR WHEEZING    ASPIRIN (ASPIR-81 ORAL) Take 1 tablet by mouth.    atorvastatin (LIPITOR) 40 MG tablet TAKE 1 TABLET(40 MG) BY MOUTH EVERY DAY    cilostazol (PLETAL) 100 MG Tab Take 1 tablet (100 mg total) by  mouth 2 (two) times daily.    clopidogrel (PLAVIX) 75 mg tablet Take 1 tablet (75 mg total) by mouth once daily.    flunisolide 25 mcg, 0.025%, (NASALIDE) 25 mcg (0.025 %) Spry 2 sprays by Nasal route 2 (two) times daily.    hydroCHLOROthiazide (HYDRODIURIL) 25 MG tablet TAKE 1 TABLET(25 MG) BY MOUTH EVERY DAY    losartan (COZAAR) 100 MG tablet TAKE 1 TABLET(100 MG) BY MOUTH EVERY DAY    metoprolol succinate (TOPROL-XL) 25 MG 24 hr tablet TAKE 1 TABLET(25 MG) BY MOUTH EVERY DAY    metoprolol succinate (TOPROL-XL) 25 MG 24 hr tablet Take one tablet PO once daily    coenzyme Q10 (COQ-10) 100 mg capsule Take 1 capsule (100 mg total) by mouth once daily.     No current facility-administered medications on file prior to visit.        REVIEW OF SYSTEMS:  General: negative; ENT: negative; Allergy and Immunology: negative; Hematological and Lymphatic: Negative; Endocrine: negative; Respiratory: no cough, shortness of breath, or wheezing; Cardiovascular: no chest pain or dyspnea on exertion; Gastrointestinal: no abdominal pain/back, change in bowel habits, or bloody stools; Genito-Urinary: no dysuria, trouble voiding, or hematuria; Musculoskeletal: negative  Neurological: no TIA or stroke symptoms    PHYSICAL EXAM:   Right Arm BP - Sittin/73 (19 1314)  Left Arm BP - Sittin/70 (19 1314)  Pulse: 75  Temp: 98.5 °F (36.9 °C)      General appearance:  Alert, well-appearing, and in no distress.  Oriented to person, place, and time   Neurological: Normal speech, no focal findings noted; CN II - XII grossly intact           Musculoskeletal: Digits/nail without cyanosis/clubbing.  Normal muscle strength/tone.                 Neck: Supple, no significant adenopathy; thyroid is not enlarged                  No carotid bruit can be auscultated                Chest:  Clear to auscultation, no wheezes, rales or rhonchi, symmetric air entry     No use of accessory muscles             Cardiac: Normal rate and  regular rhythm, S1 and S2 normal; PMI non-displaced          Abdomen: Soft, nontender, nondistended, no masses or organomegaly     No rebound tenderness noted; bowel sounds normal     Pulsatile aortic mass is not palpable.     No groin adenopathy      Extremities:   2+ left femoral pulse; 2+ waterhammer R femoral pulse     2+ left pedal pulses palpable. Biphasic R pedal signals     No pedal edema     No ulcerations    LAB RESULTS:  Lab Results   Component Value Date    K 3.0 (L) 05/10/2019    K 2.7 (LL) 03/29/2019    K 3.3 (L) 04/20/2018    CREATININE 1.2 05/10/2019    CREATININE 1.2 03/29/2019    CREATININE 1.1 04/20/2018     Lab Results   Component Value Date    WBC 5.39 05/10/2019    WBC 7.70 03/29/2019    WBC 5.96 04/13/2018    HCT 29.6 (L) 05/10/2019    HCT 28.9 (L) 03/29/2019    HCT 30.8 (L) 04/13/2018     05/10/2019     03/29/2019     04/13/2018     Lab Results   Component Value Date    HGBA1C 5.8 04/30/2013     IMAGING:    CTA shows 95% R CFA stenosis    IMP/PLAN:  58 y.o. male with extensive cardiovascular history and recent interventions.  He has lifestyle and professionally limiting RLE claudication secondary to a near occlusive lesion of the R CFA.  He will benefit from R CFA endarterectomy and is anxious to proceed.  I would prefer he be off plavix for 5-7 days prior to surgery, which I will discuss with Dr. Jenkins.  Given recent drug-eluting coronary stent, endarterectomy may be postponed for several months.     1) continue asa, plavix, statin  2) plan for CFA EA when safe from a coronary perspective to hold plavix  3) will discuss with Dr. Jenkins  4) carotid duplex    Mark Giron MD  Vascular & Endovascular Surgery

## 2019-05-23 NOTE — DISCHARGE SUMMARY
[facility]  Cardiology  Discharge Summary      Patient Name: Domingo Gross Sr.  MRN: [id number]  Admission Date: 5/10/2019  Hospital Length of Stay: 0 days  Discharge Date and Time: 5/10/2019  2:00 PM  Attending Physician: Nataly att. providers found    Discharging Provider: Karlos Zimmerman NP  Primary Care Physician: Analy Encarnacion MD    HPI:   Domingo Gross Sr. is a 58 y.o. male who presents for follow up of Peripheral Arterial Disease; Claudication; Hyperlipidemia; Hypertension; and Abnormal Stress Test      HPI:   Domingo Gross Sr. 58 y.o. male is here follow up PAD with recurrent winsome IIb claudication after bilateral SFA revascularization R (3/2018) and L (4/2018)- PTA with 6.0 mm Lutonix DCB. In 6/2017 he had L EIA PTAS for winsome IIb claudication. No ulcers. He is on aspirin and plavix for DAPT. He has R calf claudication. No ulcers. He is also complaining of chest pain at rest but never with exertion. Stress test was abnormal with significant ST depression in inferior latera leads without WMAs.   He has a h/o L SFA atherectomy with 2.2 Phoenix catheter with PTA using using 6.0 x 100 mm Lutonix DCB in 6/2015. He had distal aorta iliac reconstruction in 6/2014 with 8.0 x 39 mm BES overlapping with 9.0 x 60 in R EIA and 8.0 x 80 mm SES in L EIA post dilated with 9.0 mm bilaterally.   ELISABETH with stress 5/2017:   R 1.01 to 0.44   L 0.92 to 0.45   After 6 minutes ambulation   CTA 5/2017:   Patent distal aorta and bilateral GRUPO/EIA stents   L EIA with de shawna 90% stenosis   L SFA 80% with 3 vessel run off   R EIA/CFA with moderate disease   R SFA 80% stenosis with 3 vessel run off   Peripheral angiogram with intervention 6/2017       Patent bilateral GRUPO/EIA stents.  De hsawna 80% left EIA stenosis treated with 9.0 x 80 mm Lifestar stent post dilated with 8.0 mm balloon.  Bilateral 70-80% SFA stenosis with 3 vessel run off.   3/2018   L SFA intervention for claudication   75% L  SFA with 3 vessel run off   7 mmHg resting and 33 mmHg hyperemic mean gradient   L CFA antegrade access   PTA with 6.0 x 150 mm   PTA with 6.0 x 150 mm Lutonix   3 vessel run off   4/13/2018   S/p R SFA PTA for winsome IIb claudication   R CFA antegrade access   R CFA with 50% stenosis-heavily calcified lesion   Baseline /90   R SFA with 70% stenosis with a significant resting and hyperemic mean gradient   3 vessel run off   PTA of R SFA with 6.0 x 150 + 6.0 x 60 mm Lutonix DCB   5/2/2018   Patent arteries post revascularization   2/2019   R 0.84 to 0.27   L 0.90 to 0.66   After ambulation   Severe R calf claudication   Nuclear stress test 2/2019   + ECG with 2 mm ST depression   No wall motion abnormalities   Test stopped at 6 minutes, 7 METs, 86% predicted heart rate   Stopped because of R leg claudication + ECG changes     Procedure(s) (LRB):  AORTOGRAM-ABDOMINAL (N/A)     Indwelling Lines/Drains at time of discharge:  Lines/Drains/Airways          None          Hospital Course:  AORTOGRAM-ABDOMINAL   Conclusion        · S/p selective right leg angiogram  · Micropuncture R SFA to assess infra inguinal flow and left radial for R CFA  · 95% right CFA stenosis  · Patent SFA, DFA, POP + 3 vessel run off                 Plan:                   Vascular surgery referral                 Continue with aspirin, statin, arb, and pletal                     I certify that I was present for catheter insertion, catheter manipulation, angiography, and angiographic interpretation of this patient.     Procedure Log documented by Documenter: RT Bernarda and verified by Keo Jenkins.     Date: 5/14/2019  Time: 9:06 PM       Consults:     Significant Diagnostic Studies: Cardiac Graphics: Echocardiogram:   2D echo with color flow doppler: No results found for this or any previous visit. and Transthoracic echo (TTE) complete (Cupid Only):   Results for orders placed or performed during the hospital encounter of  02/19/19   Transthoracic echo (TTE) complete (Cupid Only)   Result Value Ref Range    LV LATERAL E/E' RATIO 7.58     LA WIDTH 2.83 cm    TDI LATERAL 0.12     PV PEAK VELOCITY 1.08 cm/s    LVIDD 4.41 3.5 - 6.0 cm    IVS 0.86 0.6 - 1.1 cm    PW 0.92 0.6 - 1.1 cm    Ao root annulus 3.99 cm    LVIDS 2.98 2.1 - 4.0 cm    FS 32 28 - 44 %    LA volume 28.50 cm3    LV mass 126.57 g    LA size 3.09 cm    RVDD 2.89 cm    Left Ventricle Relative Wall Thickness 0.42 cm    AV mean gradient 5.03 mmHg    AV valve area 3.03 cm2    AV Velocity Ratio 0.87     AV index (prosthetic) 0.79     E/A ratio 1.44     E wave decelartion time 266.14 msec    Pulm vein S/D ratio 1.17     LVOT diameter 2.21 cm    LVOT area 3.83 cm2    LVOT peak jakob [id number] m/s    LVOT peak VTI 27.57 cm    Ao peak jakob 1.54 m/s    Ao VTI 34.87 cm    LVOT stroke volume 105.70 cm3    AV peak gradient 9.49 mmHg    MV Peak E Jakob 0.91 m/s    TR Max Jakob 2.43 m/s    MV Peak A Jakob 0.63 m/s    PV Peak S Jakob 0.68 m/s    PV Peak D Jakob 0.58 m/s    LV Systolic Volume 34.53 mL    LV Diastolic Volume 88.12 mL    RA Major Axis 3.24 cm    Left Atrium Minor Axis 3.77 cm    Left Atrium Major Axis 3.90 cm    Triscuspid Valve Regurgitation Peak Gradient 23.62 mmHg    Right Atrial Pressure (from IVC) 3 mmHg    TV rest pulmonary artery pressure 27 mmHg       Pending Diagnostic Studies:     None          Final Active Diagnoses:    Diagnosis Date Noted POA    PRINCIPAL PROBLEM:  Claudication in peripheral vascular disease [I73.9] 06/13/2014 Yes    Coronary artery disease involving native coronary artery of native heart with angina pectoris with documented spasm [I25.111] 03/29/2019 Yes    Venous insufficiency of both lower extremities [I87.2] 06/04/2018 Yes    PAD (peripheral artery disease) [I73.9] 05/21/2014 Yes    Atherosclerosis of leg with intermittent claudication [I70.219] 04/21/2014 Yes    HTN (hypertension) [I10] 04/29/2013 Yes      Problems Resolved During this  Admission:     No new Assessment & Plan notes have been filed under this hospital service since the last note was generated.  Service: Cardiology      Discharged Condition: good    Disposition: Home or Self Care    Follow Up:    Patient Instructions:   No discharge procedures on file.  Medications:  Reconciled Home Medications:      Medication List      CONTINUE taking these medications    ASPIR-81 ORAL  Take 1 tablet by mouth.     atorvastatin 40 MG tablet  Commonly known as:  LIPITOR  TAKE 1 TABLET(40 MG) BY MOUTH EVERY DAY     cilostazol 100 MG Tab  Commonly known as:  PLETAL  Take 1 tablet (100 mg total) by mouth 2 (two) times daily.     clopidogrel 75 mg tablet  Commonly known as:  PLAVIX  Take 1 tablet (75 mg total) by mouth once daily.     coenzyme Q10 100 mg capsule  Commonly known as:  COQ-10  Take 1 capsule (100 mg total) by mouth once daily.     flunisolide 25 mcg (0.025%) 25 mcg (0.025 %) Spry  Commonly known as:  NASALIDE  2 sprays by Nasal route 2 (two) times daily.     * metoprolol succinate 25 MG 24 hr tablet  Commonly known as:  TOPROL-XL  TAKE 1 TABLET(25 MG) BY MOUTH EVERY DAY     * metoprolol succinate 25 MG 24 hr tablet  Commonly known as:  TOPROL-XL  Take one tablet PO once daily         * This list has 2 medication(s) that are the same as other medications prescribed for you. Read the directions carefully, and ask your doctor or other care provider to review them with you.            STOP taking these medications    predniSONE 20 MG tablet  Commonly known as:  DELTASONE            Time spent on the discharge of patient: 30 minutes    Karlos Zimmerman NP  Cardiology  [facility]

## 2019-05-23 NOTE — HOSPITAL COURSE
AORTOGRAM-ABDOMINAL   Conclusion        · S/p selective right leg angiogram  · Micropuncture R SFA to assess infra inguinal flow and left radial for R CFA  · 95% right CFA stenosis  · Patent SFA, DFA, POP + 3 vessel run off                 Plan:                   Vascular surgery referral                 Continue with aspirin, statin, arb, and pletal                     I certify that I was present for catheter insertion, catheter manipulation, angiography, and angiographic interpretation of this patient.     Procedure Log documented by Documenter: RT Bernarda and verified by Keo Jenkins.     Date: 5/14/2019  Time: 9:06 PM

## 2019-05-23 NOTE — HPI
Domingo Gross Sr. is a 58 y.o. male who presents for follow up of Peripheral Arterial Disease; Claudication; Hyperlipidemia; Hypertension; and Abnormal Stress Test      HPI:   Domingo Gross Sr. 58 y.o. male is here follow up PAD with recurrent winsome IIb claudication after bilateral SFA revascularization R (3/2018) and L (4/2018)- PTA with 6.0 mm Lutonix DCB. In 6/2017 he had L EIA PTAS for winsome IIb claudication. No ulcers. He is on aspirin and plavix for DAPT. He has R calf claudication. No ulcers. He is also complaining of chest pain at rest but never with exertion. Stress test was abnormal with significant ST depression in inferior latera leads without WMAs.   He has a h/o L SFA atherectomy with 2.2 Phoenix catheter with PTA using using 6.0 x 100 mm Lutonix DCB in 6/2015. He had distal aorta iliac reconstruction in 6/2014 with 8.0 x 39 mm BES overlapping with 9.0 x 60 in R EIA and 8.0 x 80 mm SES in L EIA post dilated with 9.0 mm bilaterally.   ELISABETH with stress 5/2017:   R 1.01 to 0.44   L 0.92 to 0.45   After 6 minutes ambulation   CTA 5/2017:   Patent distal aorta and bilateral GRUPO/EIA stents   L EIA with de shawna 90% stenosis   L SFA 80% with 3 vessel run off   R EIA/CFA with moderate disease   R SFA 80% stenosis with 3 vessel run off   Peripheral angiogram with intervention 6/2017       Patent bilateral GRUPO/EIA stents.  De shawna 80% left EIA stenosis treated with 9.0 x 80 mm Lifestar stent post dilated with 8.0 mm balloon.  Bilateral 70-80% SFA stenosis with 3 vessel run off.   3/2018   L SFA intervention for claudication   75% L SFA with 3 vessel run off   7 mmHg resting and 33 mmHg hyperemic mean gradient   L CFA antegrade access   PTA with 6.0 x 150 mm   PTA with 6.0 x 150 mm Lutonix   3 vessel run off   4/13/2018   S/p R SFA PTA for winsome IIb claudication   R CFA antegrade access   R CFA with 50% stenosis-heavily calcified lesion   Baseline /90   R SFA with 70% stenosis with a  significant resting and hyperemic mean gradient   3 vessel run off   PTA of R SFA with 6.0 x 150 + 6.0 x 60 mm Lutonix DCB   5/2/2018   Patent arteries post revascularization   2/2019   R 0.84 to 0.27   L 0.90 to 0.66   After ambulation   Severe R calf claudication   Nuclear stress test 2/2019   + ECG with 2 mm ST depression   No wall motion abnormalities   Test stopped at 6 minutes, 7 METs, 86% predicted heart rate   Stopped because of R leg claudication + ECG changes

## 2019-05-24 DIAGNOSIS — Z12.11 COLON CANCER SCREENING: ICD-10-CM

## 2019-05-24 DIAGNOSIS — E78.5 HYPERLIPIDEMIA, UNSPECIFIED HYPERLIPIDEMIA TYPE: ICD-10-CM

## 2019-05-24 RX ORDER — ATORVASTATIN CALCIUM 40 MG/1
TABLET, FILM COATED ORAL
Qty: 30 TABLET | Refills: 0 | Status: SHIPPED | OUTPATIENT
Start: 2019-05-24 | End: 2019-06-26 | Stop reason: SDUPTHER

## 2019-05-28 ENCOUNTER — PATIENT MESSAGE (OUTPATIENT)
Dept: CARDIOLOGY | Facility: CLINIC | Age: 59
End: 2019-05-28

## 2019-06-03 ENCOUNTER — OFFICE VISIT (OUTPATIENT)
Dept: CARDIOLOGY | Facility: CLINIC | Age: 59
End: 2019-06-03
Payer: COMMERCIAL

## 2019-06-03 DIAGNOSIS — E78.5 HYPERLIPIDEMIA LDL GOAL <70: ICD-10-CM

## 2019-06-03 DIAGNOSIS — I70.211 ATHEROSCLEROSIS OF NATIVE ARTERY OF RIGHT LOWER EXTREMITY WITH INTERMITTENT CLAUDICATION: Primary | ICD-10-CM

## 2019-06-03 DIAGNOSIS — I70.213 ATHEROSCLEROSIS OF NATIVE ARTERY OF BOTH LOWER EXTREMITIES WITH INTERMITTENT CLAUDICATION: ICD-10-CM

## 2019-06-03 DIAGNOSIS — I25.111 CORONARY ARTERY DISEASE INVOLVING NATIVE CORONARY ARTERY OF NATIVE HEART WITH ANGINA PECTORIS WITH DOCUMENTED SPASM: ICD-10-CM

## 2019-06-03 DIAGNOSIS — I10 ESSENTIAL HYPERTENSION: ICD-10-CM

## 2019-06-03 PROCEDURE — 99999 PR PBB SHADOW E&M-EST. PATIENT-LVL III: ICD-10-PCS | Mod: PBBFAC,,, | Performed by: INTERNAL MEDICINE

## 2019-06-03 PROCEDURE — 3078F DIAST BP <80 MM HG: CPT | Mod: CPTII,S$GLB,, | Performed by: INTERNAL MEDICINE

## 2019-06-03 PROCEDURE — 99215 OFFICE O/P EST HI 40 MIN: CPT | Mod: S$GLB,,, | Performed by: INTERNAL MEDICINE

## 2019-06-03 PROCEDURE — 3008F BODY MASS INDEX DOCD: CPT | Mod: CPTII,S$GLB,, | Performed by: INTERNAL MEDICINE

## 2019-06-03 PROCEDURE — 99215 PR OFFICE/OUTPT VISIT, EST, LEVL V, 40-54 MIN: ICD-10-PCS | Mod: S$GLB,,, | Performed by: INTERNAL MEDICINE

## 2019-06-03 PROCEDURE — 3074F PR MOST RECENT SYSTOLIC BLOOD PRESSURE < 130 MM HG: ICD-10-PCS | Mod: CPTII,S$GLB,, | Performed by: INTERNAL MEDICINE

## 2019-06-03 PROCEDURE — 3078F PR MOST RECENT DIASTOLIC BLOOD PRESSURE < 80 MM HG: ICD-10-PCS | Mod: CPTII,S$GLB,, | Performed by: INTERNAL MEDICINE

## 2019-06-03 PROCEDURE — 3008F PR BODY MASS INDEX (BMI) DOCUMENTED: ICD-10-PCS | Mod: CPTII,S$GLB,, | Performed by: INTERNAL MEDICINE

## 2019-06-03 PROCEDURE — 99999 PR PBB SHADOW E&M-EST. PATIENT-LVL III: CPT | Mod: PBBFAC,,, | Performed by: INTERNAL MEDICINE

## 2019-06-03 PROCEDURE — 3074F SYST BP LT 130 MM HG: CPT | Mod: CPTII,S$GLB,, | Performed by: INTERNAL MEDICINE

## 2019-06-03 NOTE — PATIENT INSTRUCTIONS
Discharge Instructions for Peripheral Angioplasty  You had a procedure known as peripheral angioplasty. Peripheral arteries deliver blood to your legs and feet. Over time, your artery walls may thicken and build up with a fatty substance (plaque). As plaque builds up in an artery, blood flow can be reduced or even blocked. This causes peripheral artery disease and problems in your legs and feet. Peripheral angioplasty is a procedure that helps open blockages in peripheral arteries.  Home care  · Dont drive for 2 to 3 days after the procedure or if you are taking opioid pain medicines.  · Rest for 2 to 3 days after the procedure. You will likely be able to go back to your normal activity within a few days.  · Dont lift anything heavier than 10 pounds for 5 to 7 days.  · Take your temperature and check your incision site for signs of infection (redness, swelling, drainage, or warmth) every day for a week.  · Keep a dressing on the incision site for at least 24 hours, or as directed by your doctor.  · Take your medicines exactly as directed. Dont skip doses.  · You can shower the day after the procedure.  · If you have stitches (sutures), avoid swimming or taking a bath for 7 days after the procedure or until the stitches are removed.  · Unless directed otherwise, drink 6 to 8 glasses of water a day. This can prevent dehydration. It can also flush the dye used during your procedure out of your body. (Talk to your doctor first if you are on dialysis or have heart failure.)  · Eat a healthy diet that is low in fat, salt, and cholesterol. Ask your doctor for menus and other diet information.  · Begin an exercise program. Ask your doctor how to get started. You can benefit from simple activities such as walking or gardening.  · If you are a smoker, try to break the smoking habit. Join a stop-smoking program to increase your chances of success.  Follow-up  · If you have stitches or staples, see your doctor to have them  removed 7 to 10 days after your procedure.  When to call your healthcare provider  Call your provider right away if you have any of the following:  · Fever of 100.4°F (38°C)  · Signs of infection at the incision site (redness, swelling, or warmth)  · Drainage from your incision  · Changes in color, temperature, feeling, or movement in either foot or leg  · Constant or increasing pain or numbness in your leg  · Leg swelling that does not improve overnight  · Bleeding, bruising, or a large swelling where the catheter was inserted  · Blood in your urine  · Black or tarry stools  · Dizziness or shortness of breath   Date Last Reviewed: 6/1/2016  © 1272-5456 [email or web address]. [address] [place] [address], [place] [address]. All rights reserved. This information is not intended as a substitute for professional medical care. Always follow your healthcare professional's instructions.

## 2019-06-05 ENCOUNTER — PATIENT MESSAGE (OUTPATIENT)
Dept: CARDIOLOGY | Facility: CLINIC | Age: 59
End: 2019-06-05

## 2019-06-05 VITALS
SYSTOLIC BLOOD PRESSURE: 128 MMHG | HEART RATE: 71 BPM | BODY MASS INDEX: 27.07 KG/M2 | OXYGEN SATURATION: 99 % | HEIGHT: 70 IN | DIASTOLIC BLOOD PRESSURE: 60 MMHG | WEIGHT: 189.06 LBS

## 2019-06-05 RX ORDER — DIPHENHYDRAMINE HCL 25 MG
50 CAPSULE ORAL ONCE
Status: CANCELLED | OUTPATIENT
Start: 2019-06-05 | End: 2019-06-05

## 2019-06-05 RX ORDER — SODIUM CHLORIDE 9 MG/ML
INJECTION, SOLUTION INTRAVENOUS CONTINUOUS
Status: CANCELLED | OUTPATIENT
Start: 2019-06-05

## 2019-06-05 NOTE — PROGRESS NOTES
Subjective:   Patient ID:  Domingo Gross Sr. is a 58 y.o. male who presents for follow up of Coronary Artery Disease; Peripheral Arterial Disease; and Claudication      HPI:     Domingo Gross Sr. 58 y.o. male is here follow up PAD with recurrent winsome IIb claudication after bilateral SFA revascularization R (3/2018) and L (4/2018)- PTA with 6.0 mm Lutonix DCB.  In 6/2017 he had L EIA PTAS for winsome IIb claudication. No ulcers. He is on aspirin and plavix for DAPT. He has R calf claudication. No ulcers. He is also complaining of chest pain at rest but never with exertion. Stress test was abnormal with significant ST depression in inferior latera leads without WMAs.     He has a h/o L SFA atherectomy with 2.2 [place] catheter with PTA using using 6.0 x 100 mm Lutonix DCB in 6/2015.  He had distal aorta iliac reconstruction in 6/2014 with 8.0 x 39 mm BES overlapping with 9.0 x 60 in R EIA and 8.0 x 80 mm SES in L EIA post dilated with 9.0 mm bilaterally.       Angina resolved after multivessel PCI with REZA (3/2019)        ELISABETH with stress 5/2017:   R 1.01 to 0.44   L 0.92 to 0.45    After 6 minutes ambulation      CTA 5/2017:       Patent distal aorta and bilateral GRUPO/EIA stents   L EIA with de shawna 90% stenosis   L SFA 80% with 3 vessel run off     R EIA/CFA with moderate disease   R SFA 80% stenosis with 3 vessel run off        Peripheral angiogram with intervention 6/2017         Patent bilateral GRUPO/EIA stents.    De shawna 80% left EIA stenosis treated with 9.0 x 80 mm Lifestar  stent post dilated with 8.0 mm balloon.    Bilateral 70-80% SFA stenosis with 3 vessel run off.        3/2018      L SFA intervention for claudication   75% L SFA with 3 vessel run off   7 mmHg resting and 33 mmHg hyperemic mean gradient         L CFA antegrade access   PTA with 6.0 x 150 mm   PTA with 6.0 x 150 mm Lutonix   3 vessel run off           4/13/2018       S/p R SFA PTA for winsome IIb claudication   R CFA  antegrade access         R CFA with 50% stenosis-heavily calcified lesion               Baseline /90         R SFA with 70% stenosis with a significant resting and hyperemic mean gradient     3 vessel run off             PTA of R SFA with 6.0 x 150 + 6.0 x 60 mm Lutonix DCB        5/2/2018   Patent arteries post revascularization        2/2019     R 0.84 to 0.27   L 0.90 to 0.66   After ambulation   Severe R calf claudication          Nuclear stress test 2/2019     + ECG with 2 mm ST depression   No wall motion abnormalities   Test stopped at 6 minutes, 7 METs, 86% predicted heart rate   Stopped because of R leg claudication + ECG changes          S/p PCI RCA and LAD with REZA 3/2019       RCA 3.5 x 22 mm Resolute REZA   LAD 2.5 x 30 and 2.5 x 25 mm Resolute REZA      Peripheral aortogram 5/10/2019     R CFA 95% stenosis   3 vessel run off        Seen by Dr. Giron for R CFA endarterectomy but preferable should be off plavix for at least 5 days. He has a risk for stent thrombosis with premature interruption of DAPT.               Patient Active Problem List    Diagnosis Date Noted    Atherosclerosis of native artery of right lower extremity with intermittent claudication 04/13/2018     Priority: High    Atherosclerosis of native artery of both lower extremities with intermittent claudication 03/02/2018     Priority: High    Coronary artery disease involving native coronary artery of native heart with angina pectoris with documented spasm 03/29/2019         3/29/2019    S/p LHC via R radial           LM, LAD, and LCX are patent with luminal irregularities  RCA mid 95% calcified lesion  Normal EF with LVEDP 8 mmHg           PCI of mid LAD with 2.5 x 30 and 2.5 x 15 mm Resolute REZA  PCI of proximal RCA to treat guide dissection with 3.5 x 22 Resolute REZA             Abnormal cardiovascular stress test 02/27/2019         Nuclear stress test 2/2019     + ECG with 2 mm ST depression   No wall motion  abnormalities   Test stopped at 6 minutes, 7 METs, 86% predicted heart rate   Stopped because of R leg claudication + ECG changes            Venous insufficiency of both lower extremities 06/04/2018    Localized edema 06/04/2018    Left knee pain 01/19/2016    Pain of left lower extremity 01/19/2016    Difficulty walking 01/19/2016    Acute pain of left knee 12/11/2015    Hyperlipidemia LDL goal <70 06/12/2015    DJD (degenerative joint disease), lumbar 05/27/2015    Lumbar facet arthropathy 05/27/2015    DDD (degenerative disc disease) 05/27/2015    Spondylosis without myelopathy 05/27/2015    Limb pain 11/21/2014    History of back injury 11/21/2014     20 years ago a bag fell on his back at work      Myalgia 11/21/2014    Claudication in peripheral vascular disease 06/13/2014    PAD (peripheral artery disease) 05/21/2014 6/13/2014      1. Three vessel runoff below the knee bilaterally.  2. Severe disease of the terminal aorta.  3. Successful PTAS.  4. Bilateral common iliac reconstruction with 8 x 39 mm stent extending in the aorta  5. Right common illiac was treated with an overlapping 9 x 60 mm SES   6. Left common iliac was treated with an overlapping 8 x 80 mm SES down to external iliac  7. All stents were post dilated with 9.0 x 60 mm balloons  8. Moderate bilateral SFA disease  9. Chronically occluded left internal iliac artery        6/12/2015      1. 80% mid left SFA with a 20 mmHg resting mean gradient  2. Atherectomy with 2.2 [email or web address] [place] catheter  3. PTA with 6.0 x 100 mm Lutonix drug coated balloon        6/9/2017      Patent bilateral GRUPO/EIA stents  L EIA PTAS 9.0 x 80 mm Life stent post dilated with 8.0 mm balloon  Bilateral 70-80% SFA stenosis with 3 vessel run off          3/2018      L SFA intervention for claudication   75% L SFA with 3 vessel run off   7 mmHg resting and 33 mmHg hyperemic mean gradient         L CFA antegrade access   PTA with 6.0 x 150 mm   PTA with  6.0 x 150 mm Lutonix   3 vessel run off           4/13/2018       S/p R SFA PTA for winsome IIb claudication   R CFA antegrade access         R CFA with 50% stenosis-heavily calcified lesion               Baseline /90         R SFA with 70% stenosis with a significant resting and hyperemic mean gradient     3 vessel run off             PTA of R SFA with 6.0 x 150 + 6.0 x 60 mm Lutonix DCB        5/2/2018   Patent arteries post revascularization        2/2019     R 0.84 to 0.27   L 0.90 to 0.66   After ambulation   Severe R calf claudication        Peripheral angiogram 5/10/2019                   95% R CFA stenosis; heavily calcified    Patent SFA, POP + 3 vessel run off                             Atherosclerosis of leg with intermittent claudication 04/21/2014     Winsome IIB      Elevated TSH 05/29/2013    HTN (hypertension) 04/29/2013    Elevated fasting blood sugar 04/29/2013                      LAST HbA1c  Lab Results   Component Value Date    HGBA1C 5.8 04/30/2013       Lipid panel  Lab Results   Component Value Date    CHOL 186 03/29/2019    CHOL 183 10/07/2017    CHOL 166 01/07/2017     Lab Results   Component Value Date    HDL 74 03/29/2019    HDL 51 10/07/2017    HDL 50 01/07/2017     Lab Results   Component Value Date    LDLCALC 92.2 03/29/2019    LDLCALC 107.0 10/07/2017    LDLCALC 88.6 01/07/2017     Lab Results   Component Value Date    TRIG 99 03/29/2019    TRIG 125 10/07/2017    TRIG 137 01/07/2017     Lab Results   Component Value Date    CHOLHDL 39.8 03/29/2019    CHOLHDL 27.9 10/07/2017    CHOLHDL 30.1 01/07/2017            Review of Systems   Constitution: Negative for diaphoresis, night sweats, weight gain and weight loss.   HENT: Negative for congestion.    Eyes: Negative for blurred vision, discharge and double vision.   Cardiovascular: Negative for chest pain, claudication, cyanosis, dyspnea on exertion, irregular heartbeat, leg swelling, near-syncope, orthopnea, palpitations,  paroxysmal nocturnal dyspnea and syncope.   Respiratory: Negative for cough, shortness of breath and wheezing.    Endocrine: Negative for cold intolerance, heat intolerance and polyphagia.   Hematologic/Lymphatic: Negative for adenopathy and bleeding problem. Does not bruise/bleed easily.   Skin: Negative for dry skin and nail changes.   Musculoskeletal: Negative for arthritis, back pain, falls, joint pain, myalgias and neck pain.   Gastrointestinal: Negative for bloating, abdominal pain, change in bowel habit and constipation.   Genitourinary: Negative for bladder incontinence, dysuria, flank pain, genital sores and missed menses.   Neurological: Negative for aphonia, brief paralysis, difficulty with concentration, dizziness and weakness.   Psychiatric/Behavioral: Negative for altered mental status and memory loss. The patient does not have insomnia.    Allergic/Immunologic: Negative for environmental allergies.       Objective:   Physical Exam   Constitutional: He is oriented to person, place, and time. He appears well-developed and well-nourished. He is not intubated.   HENT:   Head: Normocephalic and atraumatic.   Right Ear: External ear normal.   Left Ear: External ear normal.   Mouth/Throat: Oropharynx is clear and moist.   Eyes: Pupils are equal, round, and reactive to light. Conjunctivae and EOM are normal. Right eye exhibits no discharge. Left eye exhibits no discharge. No scleral icterus.   Neck: Normal range of motion. Neck supple. Normal carotid pulses, no hepatojugular reflux and no JVD present. Carotid bruit is not present. No tracheal deviation present. No thyromegaly present.   Cardiovascular: Normal rate, regular rhythm, S1 normal and S2 normal.  No extrasystoles are present. PMI is not displaced. Exam reveals no gallop, no S3, no distant heart sounds, no friction rub and no midsystolic click.   No murmur heard.  Pulses:       Carotid pulses are 2+ on the right side, and 2+ on the left side.        Radial pulses are 2+ on the right side, and 2+ on the left side.        Femoral pulses are 2+ on the right side, and 2+ on the left side.       Popliteal pulses are 2+ on the right side, and 2+ on the left side.        Dorsalis pedis pulses are 1+ on the right side, and 2+ on the left side.        Posterior tibial pulses are 1+ on the right side, and 2+ on the left side.     Biphasic R DP and PT doppler signals      Triphasic L DP and PT doppler signals                     Pulmonary/Chest: Effort normal and breath sounds normal. No accessory muscle usage or stridor. No apnea, no tachypnea and no bradypnea. He is not intubated. No respiratory distress. He has no decreased breath sounds. He has no wheezes. He has no rales. He exhibits no tenderness and no bony tenderness.   Abdominal: He exhibits no distension, no pulsatile liver, no abdominal bruit, no ascites, no pulsatile midline mass and no mass. There is no tenderness. There is no rebound and no guarding.   Musculoskeletal: Normal range of motion. He exhibits no edema or tenderness.   Lymphadenopathy:     He has no cervical adenopathy.   Neurological: He is alert and oriented to person, place, and time. He has normal reflexes. No cranial nerve deficit. Coordination normal.   Skin: Skin is warm. No rash noted. No erythema. No pallor.   Psychiatric: He has a normal mood and affect. His behavior is normal. Judgment and thought content normal.       Assessment:     1. Atherosclerosis of native artery of right lower extremity with intermittent claudication    2. Atherosclerosis of native artery of both lower extremities with intermittent claudication    3. Essential hypertension    4. Coronary artery disease involving native coronary artery of native heart with angina pectoris with documented spasm    5. Hyperlipidemia LDL goal <70        Plan:       We will discuss his case with vascular surgery team.       If R CFA endarterectomy cannot be safely performed while on  DAPT which he cannot stop for an elective intervention until 3/2020 he will proceed with endovascular intervention with JOSY approach.       JOSY (Tibiopedal Arterial Minimally Invasive) access   Right PT with 5-6 slender sheath (Terumo)   Atherectomy with [place] + PTA with Lutonix DCB.       DAPT with aspirin + plavix  Intense statin therapy  Arb  Pletal       Continue with current medical plan and lifestyle changes.  Return sooner for concerns or questions. If symptoms persist go to the ED  I have reviewed all pertinent data on this patient       I have reviewed the patient's medical history in detail and updated the computerized patient record.    Orders Placed This Encounter   Procedures    Case Request-Cath Lab: PTA, PERIPHERAL VESSEL     Standing Status:   Standing     Number of Occurrences:   1     Order Specific Question:   CPT Code:     Answer:   [place] FEM/POPL REVASC STNT & ATHERECTOMY [43347]     Order Specific Question:   Medical Necessity:     Answer:   Medically Urgent [101]   t    Follow up as scheduled. Return sooner for concerns or questions            He expressed verbal understanding and agreed with the plan        Greater than 50% of the visit of 45 minutes was spent counseling, educating, and coordinating the care of the patient.        Greater than 50% of the visit of 45 minutes was spent counseling, educating, and coordinating the care of the patient.      -In today's visit, at least 4 established conditions that pose a risk to life or bodily function have been addressed and the conditions are severe.    -In today's visit, monitoring for drug toxicity was accomplished.              Patient's Medications   New Prescriptions    No medications on file   Previous Medications    ALBUTEROL (PROVENTIL/VENTOLIN HFA) 90 MCG/ACTUATION INHALER    INHALE 2 PUFFS INTO THE LUNGS EVERY 6 HOURS AS NEEDED FOR WHEEZING    ASPIRIN (ASPIR-81 ORAL)    Take 1 tablet by mouth.    ATORVASTATIN (LIPITOR) 40 MG  TABLET    TAKE 1 TABLET(40 MG) BY MOUTH EVERY DAY    CILOSTAZOL (PLETAL) 100 MG TAB    Take 1 tablet (100 mg total) by mouth 2 (two) times daily.    CLOPIDOGREL (PLAVIX) 75 MG TABLET    Take 1 tablet (75 mg total) by mouth once daily.    COENZYME Q10 (COQ-10) 100 MG CAPSULE    Take 1 capsule (100 mg total) by mouth once daily.    FLUNISOLIDE 25 MCG, 0.025%, (NASALIDE) 25 MCG (0.025 %) SPRY    2 sprays by Nasal route 2 (two) times daily.    HYDROCHLOROTHIAZIDE (HYDRODIURIL) 25 MG TABLET    TAKE 1 TABLET(25 MG) BY MOUTH EVERY DAY    LOSARTAN (COZAAR) 100 MG TABLET    TAKE 1 TABLET(100 MG) BY MOUTH EVERY DAY    METOPROLOL SUCCINATE (TOPROL-XL) 25 MG 24 HR TABLET    TAKE 1 TABLET(25 MG) BY MOUTH EVERY DAY    METOPROLOL SUCCINATE (TOPROL-XL) 25 MG 24 HR TABLET    Take one tablet PO once daily   Modified Medications    No medications on file   Discontinued Medications    No medications on file

## 2019-06-14 DIAGNOSIS — Z12.11 COLON CANCER SCREENING: ICD-10-CM

## 2019-06-18 DIAGNOSIS — I10 ESSENTIAL HYPERTENSION: ICD-10-CM

## 2019-06-18 DIAGNOSIS — I73.9 PAD (PERIPHERAL ARTERY DISEASE): ICD-10-CM

## 2019-06-19 RX ORDER — HYDROCHLOROTHIAZIDE 25 MG/1
TABLET ORAL
Qty: 30 TABLET | Refills: 0 | Status: SHIPPED | OUTPATIENT
Start: 2019-06-19 | End: 2019-07-14 | Stop reason: SDUPTHER

## 2019-06-19 RX ORDER — LOSARTAN POTASSIUM 100 MG/1
TABLET ORAL
Qty: 30 TABLET | Refills: 0 | Status: SHIPPED | OUTPATIENT
Start: 2019-06-19 | End: 2019-07-14 | Stop reason: SDUPTHER

## 2019-06-20 RX ORDER — CILOSTAZOL 100 MG/1
TABLET ORAL
Qty: 180 TABLET | Refills: 3 | Status: SHIPPED | OUTPATIENT
Start: 2019-06-20 | End: 2020-04-17

## 2019-06-21 DIAGNOSIS — Z12.11 COLON CANCER SCREENING: ICD-10-CM

## 2019-06-26 DIAGNOSIS — I10 ESSENTIAL HYPERTENSION: ICD-10-CM

## 2019-06-26 DIAGNOSIS — E78.5 HYPERLIPIDEMIA, UNSPECIFIED HYPERLIPIDEMIA TYPE: ICD-10-CM

## 2019-06-27 RX ORDER — METOPROLOL SUCCINATE 25 MG/1
TABLET, EXTENDED RELEASE ORAL
Qty: 90 TABLET | Refills: 0 | Status: SHIPPED | OUTPATIENT
Start: 2019-06-27 | End: 2019-08-09 | Stop reason: SDUPTHER

## 2019-06-27 RX ORDER — ATORVASTATIN CALCIUM 40 MG/1
TABLET, FILM COATED ORAL
Qty: 30 TABLET | Refills: 0 | Status: SHIPPED | OUTPATIENT
Start: 2019-06-27 | End: 2019-07-20 | Stop reason: SDUPTHER

## 2019-07-12 ENCOUNTER — HOSPITAL ENCOUNTER (OUTPATIENT)
Facility: HOSPITAL | Age: 59
Discharge: HOME | End: 2019-07-12
Attending: INTERNAL MEDICINE | Admitting: INTERNAL MEDICINE
Payer: COMMERCIAL

## 2019-07-12 VITALS
OXYGEN SATURATION: 100 % | RESPIRATION RATE: 20 BRPM | HEIGHT: 70 IN | WEIGHT: 195 LBS | BODY MASS INDEX: 27.92 KG/M2 | HEART RATE: 77 BPM | SYSTOLIC BLOOD PRESSURE: 136 MMHG | DIASTOLIC BLOOD PRESSURE: 62 MMHG | TEMPERATURE: 98 F

## 2019-07-12 DIAGNOSIS — Z01.810 PREOP CARDIOVASCULAR EXAM: ICD-10-CM

## 2019-07-12 DIAGNOSIS — I70.211 ATHEROSCLEROSIS OF NATIVE ARTERY OF RIGHT LOWER EXTREMITY WITH INTERMITTENT CLAUDICATION: ICD-10-CM

## 2019-07-12 LAB
ANION GAP SERPL CALC-SCNC: 9 MMOL/L (ref 8–16)
BUN SERPL-MCNC: 21 MG/DL (ref 6–20)
CALCIUM SERPL-MCNC: 8.6 MG/DL (ref 8.7–10.5)
CHLORIDE SERPL-SCNC: 103 MMOL/L (ref 95–110)
CO2 SERPL-SCNC: 29 MMOL/L (ref 23–29)
CREAT SERPL-MCNC: 1.3 MG/DL (ref 0.5–1.4)
ERYTHROCYTE [DISTWIDTH] IN BLOOD BY AUTOMATED COUNT: 12.7 % (ref 11.5–14.5)
EST. GFR  (AFRICAN AMERICAN): >60 ML/MIN/1.73 M^2
EST. GFR  (NON AFRICAN AMERICAN): >60 ML/MIN/1.73 M^2
GLUCOSE SERPL-MCNC: 104 MG/DL (ref 70–110)
HCT VFR BLD AUTO: 29.6 % (ref 40–54)
HGB BLD-MCNC: 9.9 G/DL (ref 14–18)
MCH RBC QN AUTO: 27 PG (ref 27–31)
MCHC RBC AUTO-ENTMCNC: 33.4 G/DL (ref 32–36)
MCV RBC AUTO: 81 FL (ref 82–98)
PLATELET # BLD AUTO: 239 K/UL (ref 150–350)
PMV BLD AUTO: 9.1 FL (ref 9.2–12.9)
POC ACTIVATED CLOTTING TIME K: 191 SEC (ref 74–137)
POC ACTIVATED CLOTTING TIME K: 213 SEC (ref 74–137)
POC ACTIVATED CLOTTING TIME K: 224 SEC (ref 74–137)
POC ACTIVATED CLOTTING TIME K: 230 SEC (ref 74–137)
POTASSIUM SERPL-SCNC: 2.8 MMOL/L (ref 3.5–5.1)
RBC # BLD AUTO: 3.66 M/UL (ref 4.6–6.2)
SAMPLE: ABNORMAL
SODIUM SERPL-SCNC: 141 MMOL/L (ref 136–145)
WBC # BLD AUTO: 5.72 K/UL (ref 3.9–12.7)

## 2019-07-12 PROCEDURE — 99220 PR INITIAL OBSERVATION CARE,LEVL III: ICD-10-PCS | Mod: 25,,, | Performed by: INTERNAL MEDICINE

## 2019-07-12 PROCEDURE — C1726 CATH, BAL DIL, NON-VASCULAR: HCPCS | Performed by: INTERNAL MEDICINE

## 2019-07-12 PROCEDURE — 99220 PR INITIAL OBSERVATION CARE,LEVL III: CPT | Mod: 25,,, | Performed by: INTERNAL MEDICINE

## 2019-07-12 PROCEDURE — 99152 MOD SED SAME PHYS/QHP 5/>YRS: CPT | Mod: ,,, | Performed by: INTERNAL MEDICINE

## 2019-07-12 PROCEDURE — 85347 COAGULATION TIME ACTIVATED: CPT | Performed by: INTERNAL MEDICINE

## 2019-07-12 PROCEDURE — C1714 CATH, TRANS ATHERECTOMY, DIR: HCPCS | Performed by: INTERNAL MEDICINE

## 2019-07-12 PROCEDURE — C1725 CATH, TRANSLUMIN NON-LASER: HCPCS | Performed by: INTERNAL MEDICINE

## 2019-07-12 PROCEDURE — 37225 PR FEM/POPL REVAS W/ATHERECTOMY: ICD-10-PCS | Mod: RT,,, | Performed by: INTERNAL MEDICINE

## 2019-07-12 PROCEDURE — 27201423 OPTIME MED/SURG SUP & DEVICES STERILE SUPPLY: Performed by: INTERNAL MEDICINE

## 2019-07-12 PROCEDURE — 36415 COLL VENOUS BLD VENIPUNCTURE: CPT

## 2019-07-12 PROCEDURE — 37225 PR FEM/POPL REVAS W/ATHERECTOMY: CPT | Mod: RT,,, | Performed by: INTERNAL MEDICINE

## 2019-07-12 PROCEDURE — 99152 MOD SED SAME PHYS/QHP 5/>YRS: CPT | Performed by: INTERNAL MEDICINE

## 2019-07-12 PROCEDURE — 99153 MOD SED SAME PHYS/QHP EA: CPT | Performed by: INTERNAL MEDICINE

## 2019-07-12 PROCEDURE — C1753 CATH, INTRAVAS ULTRASOUND: HCPCS | Performed by: INTERNAL MEDICINE

## 2019-07-12 PROCEDURE — 25500020 PHARM REV CODE 255: Performed by: INTERNAL MEDICINE

## 2019-07-12 PROCEDURE — 37225 HC FEM/POPL REVAS W/ATHER: CPT | Mod: RT | Performed by: INTERNAL MEDICINE

## 2019-07-12 PROCEDURE — 25000003 PHARM REV CODE 250: Performed by: INTERNAL MEDICINE

## 2019-07-12 PROCEDURE — C1887 CATHETER, GUIDING: HCPCS | Performed by: INTERNAL MEDICINE

## 2019-07-12 PROCEDURE — 80048 BASIC METABOLIC PNL TOTAL CA: CPT

## 2019-07-12 PROCEDURE — C1894 INTRO/SHEATH, NON-LASER: HCPCS | Performed by: INTERNAL MEDICINE

## 2019-07-12 PROCEDURE — 99152 PR MOD CONSCIOUS SEDATION, SAME PHYS, 5+ YRS, FIRST 15 MIN: ICD-10-PCS | Mod: ,,, | Performed by: INTERNAL MEDICINE

## 2019-07-12 PROCEDURE — 93005 ELECTROCARDIOGRAM TRACING: CPT

## 2019-07-12 PROCEDURE — 85027 COMPLETE CBC AUTOMATED: CPT

## 2019-07-12 PROCEDURE — 63600175 PHARM REV CODE 636 W HCPCS: Performed by: INTERNAL MEDICINE

## 2019-07-12 PROCEDURE — C1769 GUIDE WIRE: HCPCS | Performed by: INTERNAL MEDICINE

## 2019-07-12 RX ORDER — SODIUM CHLORIDE 9 MG/ML
INJECTION, SOLUTION INTRAVENOUS
Status: DISCONTINUED | OUTPATIENT
Start: 2019-07-12 | End: 2019-07-12 | Stop reason: HOSPADM

## 2019-07-12 RX ORDER — POTASSIUM CHLORIDE 7.45 MG/ML
10 INJECTION INTRAVENOUS ONCE
Status: DISCONTINUED | OUTPATIENT
Start: 2019-07-12 | End: 2019-07-12 | Stop reason: HOSPADM

## 2019-07-12 RX ORDER — FENTANYL CITRATE 50 UG/ML
INJECTION, SOLUTION INTRAMUSCULAR; INTRAVENOUS
Status: DISCONTINUED | OUTPATIENT
Start: 2019-07-12 | End: 2019-07-12 | Stop reason: HOSPADM

## 2019-07-12 RX ORDER — HEPARIN SODIUM 1000 [USP'U]/ML
INJECTION, SOLUTION INTRAVENOUS; SUBCUTANEOUS
Status: DISCONTINUED | OUTPATIENT
Start: 2019-07-12 | End: 2019-07-12 | Stop reason: HOSPADM

## 2019-07-12 RX ORDER — MIDAZOLAM HYDROCHLORIDE 1 MG/ML
INJECTION, SOLUTION INTRAMUSCULAR; INTRAVENOUS
Status: DISCONTINUED | OUTPATIENT
Start: 2019-07-12 | End: 2019-07-12 | Stop reason: HOSPADM

## 2019-07-12 RX ORDER — VERAPAMIL HYDROCHLORIDE 2.5 MG/ML
INJECTION, SOLUTION INTRAVENOUS
Status: DISCONTINUED | OUTPATIENT
Start: 2019-07-12 | End: 2019-07-12 | Stop reason: HOSPADM

## 2019-07-12 RX ORDER — POTASSIUM CHLORIDE 7.45 MG/ML
INJECTION INTRAVENOUS
Status: DISCONTINUED | OUTPATIENT
Start: 2019-07-12 | End: 2019-07-12 | Stop reason: HOSPADM

## 2019-07-12 RX ORDER — LIDOCAINE HYDROCHLORIDE 10 MG/ML
INJECTION INFILTRATION; PERINEURAL
Status: DISCONTINUED | OUTPATIENT
Start: 2019-07-12 | End: 2019-07-12 | Stop reason: HOSPADM

## 2019-07-12 RX ORDER — SODIUM CHLORIDE 9 MG/ML
INJECTION, SOLUTION INTRAVENOUS CONTINUOUS
Status: DISCONTINUED | OUTPATIENT
Start: 2019-07-12 | End: 2019-07-12 | Stop reason: HOSPADM

## 2019-07-12 RX ORDER — IODIXANOL 320 MG/ML
INJECTION, SOLUTION INTRAVASCULAR
Status: DISCONTINUED | OUTPATIENT
Start: 2019-07-12 | End: 2019-07-12 | Stop reason: HOSPADM

## 2019-07-12 RX ORDER — DIPHENHYDRAMINE HCL 25 MG
50 CAPSULE ORAL ONCE
Status: DISCONTINUED | OUTPATIENT
Start: 2019-07-12 | End: 2019-07-12 | Stop reason: HOSPADM

## 2019-07-12 RX ORDER — HYDROCODONE BITARTRATE AND ACETAMINOPHEN 5; 325 MG/1; MG/1
1 TABLET ORAL EVERY 4 HOURS PRN
Status: DISCONTINUED | OUTPATIENT
Start: 2019-07-12 | End: 2019-07-12 | Stop reason: HOSPADM

## 2019-07-12 RX ORDER — HEPARIN SODIUM 200 [USP'U]/100ML
INJECTION, SOLUTION INTRAVENOUS
Status: DISCONTINUED | OUTPATIENT
Start: 2019-07-12 | End: 2019-07-12 | Stop reason: HOSPADM

## 2019-07-12 RX ORDER — ACETAMINOPHEN 325 MG/1
650 TABLET ORAL EVERY 4 HOURS PRN
Status: DISCONTINUED | OUTPATIENT
Start: 2019-07-12 | End: 2019-07-12 | Stop reason: HOSPADM

## 2019-07-12 RX ORDER — DIPHENHYDRAMINE HYDROCHLORIDE 50 MG/ML
INJECTION INTRAMUSCULAR; INTRAVENOUS
Status: DISCONTINUED | OUTPATIENT
Start: 2019-07-12 | End: 2019-07-12 | Stop reason: HOSPADM

## 2019-07-12 RX ADMIN — SODIUM CHLORIDE: 0.9 INJECTION, SOLUTION INTRAVENOUS at 06:07

## 2019-07-12 RX ADMIN — SODIUM CHLORIDE 132.75 ML: 0.9 INJECTION, SOLUTION INTRAVENOUS at 11:07

## 2019-07-12 NOTE — H&P
Patient ID:  Domingo Gross Sr. is a 58 y.o. male who presents for follow up of Coronary Artery Disease; Peripheral Arterial Disease; and Claudication        HPI:      Domingo Gross Sr. 58 y.o. male is here follow up PAD with recurrent winsome IIb claudication after bilateral SFA revascularization R (3/2018) and L (4/2018)- PTA with 6.0 mm Lutonix DCB.  In 6/2017 he had L EIA PTAS for winsome IIb claudication. No ulcers. He is on aspirin and plavix for DAPT. He has R calf claudication. No ulcers. He is also complaining of chest pain at rest but never with exertion. Stress test was abnormal with significant ST depression in inferior latera leads without WMAs.      He has a h/o L SFA atherectomy with 2.2 Phoenix catheter with PTA using using 6.0 x 100 mm Lutonix DCB in 6/2015.  He had distal aorta iliac reconstruction in 6/2014 with 8.0 x 39 mm BES overlapping with 9.0 x 60 in R EIA and 8.0 x 80 mm SES in L EIA post dilated with 9.0 mm bilaterally.         Angina resolved after multivessel PCI with REZA (3/2019)           ELISABETH with stress 5/2017:              R 1.01 to 0.44              L 0.92 to 0.45               After 6 minutes ambulation        CTA 5/2017:                    Patent distal aorta and bilateral GRUPO/EIA stents              L EIA with de shawna 90% stenosis              L SFA 80% with 3 vessel run off                 R EIA/CFA with moderate disease              R SFA 80% stenosis with 3 vessel run off           Peripheral angiogram with intervention 6/2017         Patent bilateral GRUPO/EIA stents.    De shawna 80% left EIA stenosis treated with 9.0 x 80 mm Lifestar  stent post dilated with 8.0 mm balloon.    Bilateral 70-80% SFA stenosis with 3 vessel run off.           3/2018        L SFA intervention for claudication              75% L SFA with 3 vessel run off              7 mmHg resting and 33 mmHg hyperemic mean gradient                                L CFA antegrade access              PTA  with 6.0 x 150 mm              PTA with 6.0 x 150 mm Lutonix              3 vessel run off               4/13/2018                    S/p R SFA PTA for winsome IIb claudication              R CFA antegrade access                    R CFA with 50% stenosis-heavily calcified lesion                          Baseline /90                    R SFA with 70% stenosis with a significant resting and hyperemic mean gradient               3 vessel run off                        PTA of R SFA with 6.0 x 150 + 6.0 x 60 mm Lutonix DCB           5/2/2018              Patent arteries post revascularization           2/2019                 R 0.84 to 0.27              L 0.90 to 0.66              After ambulation              Severe R calf claudication              Nuclear stress test 2/2019                 + ECG with 2 mm ST depression              No wall motion abnormalities              Test stopped at 6 minutes, 7 METs, 86% predicted heart rate              Stopped because of R leg claudication + ECG changes              S/p PCI RCA and LAD with REZA 3/2019                    RCA 3.5 x 22 mm Resolute REZA              LAD 2.5 x 30 and 2.5 x 25 mm Resolute REZA        Peripheral aortogram 5/10/2019                 R CFA 95% stenosis              3 vessel run off           Seen by Dr. Giron for R CFA endarterectomy but preferable should be off plavix for at least 5 days. He has a risk for stent thrombosis with premature interruption of DAPT.                     Patient Active Problem List     Diagnosis Date Noted    Atherosclerosis of native artery of right lower extremity with intermittent claudication 04/13/2018       Priority: High    Atherosclerosis of native artery of both lower extremities with intermittent claudication 03/02/2018       Priority: High    Coronary artery disease involving native coronary artery of native heart with angina pectoris with documented spasm 03/29/2019             3/29/2019     S/p LHC via R  radial           LM, LAD, and LCX are patent with luminal irregularities  RCA mid 95% calcified lesion  Normal EF with LVEDP 8 mmHg           PCI of mid LAD with 2.5 x 30 and 2.5 x 15 mm Resolute REZA  PCI of proximal RCA to treat guide dissection with 3.5 x 22 Resolute REZA                Abnormal cardiovascular stress test 02/27/2019             Nuclear stress test 2/2019                 + ECG with 2 mm ST depression              No wall motion abnormalities              Test stopped at 6 minutes, 7 METs, 86% predicted heart rate              Stopped because of R leg claudication + ECG changes                Venous insufficiency of both lower extremities 06/04/2018    Localized edema 06/04/2018    Left knee pain 01/19/2016    Pain of left lower extremity 01/19/2016    Difficulty walking 01/19/2016    Acute pain of left knee 12/11/2015    Hyperlipidemia LDL goal <70 06/12/2015    DJD (degenerative joint disease), lumbar 05/27/2015    Lumbar facet arthropathy 05/27/2015    DDD (degenerative disc disease) 05/27/2015    Spondylosis without myelopathy 05/27/2015    Limb pain 11/21/2014    History of back injury 11/21/2014       20 years ago a bag fell on his back at work       Myalgia 11/21/2014    Claudication in peripheral vascular disease 06/13/2014    PAD (peripheral artery disease) 05/21/2014 6/13/2014      1. Three vessel runoff below the knee bilaterally.  2. Severe disease of the terminal aorta.  3. Successful PTAS.  4. Bilateral common iliac reconstruction with 8 x 39 mm stent extending in the aorta  5. Right common illiac was treated with an overlapping 9 x 60 mm SES   6. Left common iliac was treated with an overlapping 8 x 80 mm SES down to external iliac  7. All stents were post dilated with 9.0 x 60 mm balloons  8. Moderate bilateral SFA disease  9. Chronically occluded left internal iliac artery           6/12/2015        1. 80% mid left SFA with a 20 mmHg resting mean gradient  2.  Atherectomy with 2.2 Phonenix Volcano catheter  3. PTA with 6.0 x 100 mm Lutonix drug coated balloon           6/9/2017        Patent bilateral GRUPO/EIA stents  L EIA PTAS 9.0 x 80 mm Life stent post dilated with 8.0 mm balloon  Bilateral 70-80% SFA stenosis with 3 vessel run off              3/2018        L SFA intervention for claudication              75% L SFA with 3 vessel run off              7 mmHg resting and 33 mmHg hyperemic mean gradient                                L CFA antegrade access              PTA with 6.0 x 150 mm              PTA with 6.0 x 150 mm Lutonix              3 vessel run off               4/13/2018                    S/p R SFA PTA for winsome IIb claudication              R CFA antegrade access                    R CFA with 50% stenosis-heavily calcified lesion                          Baseline /90                    R SFA with 70% stenosis with a significant resting and hyperemic mean gradient               3 vessel run off                        PTA of R SFA with 6.0 x 150 + 6.0 x 60 mm Lutonix DCB           5/2/2018              Patent arteries post revascularization           2/2019                 R 0.84 to 0.27              L 0.90 to 0.66              After ambulation              Severe R calf claudication           Peripheral angiogram 5/10/2019                    95% R CFA stenosis; heavily calcified               Patent SFA, POP + 3 vessel run off                                                  Atherosclerosis of leg with intermittent claudication 04/21/2014       Winsome IIB       Elevated TSH 05/29/2013    HTN (hypertension) 04/29/2013    Elevated fasting blood sugar 04/29/2013                           LAST HbA1c        Lab Results   Component Value Date     HGBA1C 5.8 04/30/2013         Lipid panel        Lab Results   Component Value Date     CHOL 186 03/29/2019     CHOL 183 10/07/2017     CHOL 166 01/07/2017            Lab Results   Component Value Date      HDL 74 03/29/2019     HDL 51 10/07/2017     HDL 50 01/07/2017            Lab Results   Component Value Date     LDLCALC 92.2 03/29/2019     LDLCALC 107.0 10/07/2017     LDLCALC 88.6 01/07/2017            Lab Results   Component Value Date     TRIG 99 03/29/2019     TRIG 125 10/07/2017     TRIG 137 01/07/2017            Lab Results   Component Value Date     CHOLHDL 39.8 03/29/2019     CHOLHDL 27.9 10/07/2017     CHOLHDL 30.1 01/07/2017               Review of Systems   Constitution: Negative for diaphoresis, night sweats, weight gain and weight loss.   HENT: Negative for congestion.    Eyes: Negative for blurred vision, discharge and double vision.   Cardiovascular: Negative for chest pain, claudication, cyanosis, dyspnea on exertion, irregular heartbeat, leg swelling, near-syncope, orthopnea, palpitations, paroxysmal nocturnal dyspnea and syncope.   Respiratory: Negative for cough, shortness of breath and wheezing.    Endocrine: Negative for cold intolerance, heat intolerance and polyphagia.   Hematologic/Lymphatic: Negative for adenopathy and bleeding problem. Does not bruise/bleed easily.   Skin: Negative for dry skin and nail changes.   Musculoskeletal: Negative for arthritis, back pain, falls, joint pain, myalgias and neck pain.   Gastrointestinal: Negative for bloating, abdominal pain, change in bowel habit and constipation.   Genitourinary: Negative for bladder incontinence, dysuria, flank pain, genital sores and missed menses.   Neurological: Negative for aphonia, brief paralysis, difficulty with concentration, dizziness and weakness.   Psychiatric/Behavioral: Negative for altered mental status and memory loss. The patient does not have insomnia.    Allergic/Immunologic: Negative for environmental allergies.         Objective:   Physical Exam   Constitutional: He is oriented to person, place, and time. He appears well-developed and well-nourished. He is not intubated.   HENT:   Head: Normocephalic and  atraumatic.   Right Ear: External ear normal.   Left Ear: External ear normal.   Mouth/Throat: Oropharynx is clear and moist.   Eyes: Pupils are equal, round, and reactive to light. Conjunctivae and EOM are normal. Right eye exhibits no discharge. Left eye exhibits no discharge. No scleral icterus.   Neck: Normal range of motion. Neck supple. Normal carotid pulses, no hepatojugular reflux and no JVD present. Carotid bruit is not present. No tracheal deviation present. No thyromegaly present.   Cardiovascular: Normal rate, regular rhythm, S1 normal and S2 normal.  No extrasystoles are present. PMI is not displaced. Exam reveals no gallop, no S3, no distant heart sounds, no friction rub and no midsystolic click.   No murmur heard.  Pulses:       Carotid pulses are 2+ on the right side, and 2+ on the left side.       Radial pulses are 2+ on the right side, and 2+ on the left side.        Femoral pulses are 2+ on the right side, and 2+ on the left side.       Popliteal pulses are 2+ on the right side, and 2+ on the left side.        Dorsalis pedis pulses are 1+ on the right side, and 2+ on the left side.        Posterior tibial pulses are 1+ on the right side, and 2+ on the left side.     Biphasic R DP and PT doppler signals      Triphasic L DP and PT doppler signals                     Pulmonary/Chest: Effort normal and breath sounds normal. No accessory muscle usage or stridor. No apnea, no tachypnea and no bradypnea. He is not intubated. No respiratory distress. He has no decreased breath sounds. He has no wheezes. He has no rales. He exhibits no tenderness and no bony tenderness.   Abdominal: He exhibits no distension, no pulsatile liver, no abdominal bruit, no ascites, no pulsatile midline mass and no mass. There is no tenderness. There is no rebound and no guarding.   Musculoskeletal: Normal range of motion. He exhibits no edema or tenderness.   Lymphadenopathy:     He has no cervical adenopathy.   Neurological:  He is alert and oriented to person, place, and time. He has normal reflexes. No cranial nerve deficit. Coordination normal.   Skin: Skin is warm. No rash noted. No erythema. No pallor.   Psychiatric: He has a normal mood and affect. His behavior is normal. Judgment and thought content normal.         Assessment:      1. Atherosclerosis of native artery of right lower extremity with intermittent claudication    2. Atherosclerosis of native artery of both lower extremities with intermittent claudication    3. Essential hypertension    4. Coronary artery disease involving native coronary artery of native heart with angina pectoris with documented spasm    5. Hyperlipidemia LDL goal <70          Plan:         We will discuss his case with vascular surgery team.         If R CFA endarterectomy cannot be safely performed while on DAPT which he cannot stop for an elective intervention until 3/2020 he will proceed with endovascular intervention with JOSY approach.         JOSY (Tibiopedal Arterial Minimally Invasive) access   Right PT with 5-6 slender sheath (Terumo)   Atherectomy with Silver Hawk + PTA with Lutonix DCB.         DAPT with aspirin + plavix  Intense statin therapy  Arb  Pletal         Continue with current medical plan and lifestyle changes.  Return sooner for concerns or questions. If symptoms persist go to the ED  I have reviewed all pertinent data on this patient         I have reviewed the patient's medical history in detail and updated the computerized patient record.           Orders Placed This Encounter   Procedures    Case Request-Cath Lab: PTA, PERIPHERAL VESSEL       Standing Status:   Standing       Number of Occurrences:   1       Order Specific Question:   CPT Code:       Answer:   [place] FEM/POPL REVASC STNT & ATHERECTOMY [[id number]]       Order Specific Question:   Medical Necessity:       Answer:   Medically Urgent [101]   t     Follow up as scheduled. Return sooner for concerns or  questions                 He expressed verbal understanding and agreed with the plan           Greater than 50% of the visit of 45 minutes was spent counseling, educating, and coordinating the care of the patient.           Greater than 50% of the visit of 45 minutes was spent counseling, educating, and coordinating the care of the patient.        -In today's visit, at least 4 established conditions that pose a risk to life or bodily function have been addressed and the conditions are severe.     -In today's visit, monitoring for drug toxicity was accomplished.                         Patient's Medications   New Prescriptions     No medications on file   Previous Medications     ALBUTEROL (PROVENTIL/VENTOLIN HFA) 90 MCG/ACTUATION INHALER    INHALE 2 PUFFS INTO THE LUNGS EVERY 6 HOURS AS NEEDED FOR WHEEZING     ASPIRIN (ASPIR-81 ORAL)    Take 1 tablet by mouth.     ATORVASTATIN (LIPITOR) 40 MG TABLET    TAKE 1 TABLET(40 MG) BY MOUTH EVERY DAY     CILOSTAZOL (PLETAL) 100 MG TAB    Take 1 tablet (100 mg total) by mouth 2 (two) times daily.     CLOPIDOGREL (PLAVIX) 75 MG TABLET    Take 1 tablet (75 mg total) by mouth once daily.     COENZYME Q10 (COQ-10) 100 MG CAPSULE    Take 1 capsule (100 mg total) by mouth once daily.     FLUNISOLIDE 25 MCG, 0.025%, (NASALIDE) 25 MCG (0.025 %) SPRY    2 sprays by Nasal route 2 (two) times daily.     HYDROCHLOROTHIAZIDE (HYDRODIURIL) 25 MG TABLET    TAKE 1 TABLET(25 MG) BY MOUTH EVERY DAY     LOSARTAN (COZAAR) 100 MG TABLET    TAKE 1 TABLET(100 MG) BY MOUTH EVERY DAY     METOPROLOL SUCCINATE (TOPROL-XL) 25 MG 24 HR TABLET    TAKE 1 TABLET(25 MG) BY MOUTH EVERY DAY     METOPROLOL SUCCINATE (TOPROL-XL) 25 MG 24 HR TABLET    Take one tablet PO once daily   Modified Medications     No medications on file   Discontinued Medications     No medications on file

## 2019-07-12 NOTE — PLAN OF CARE
2 mL of air removed from radial vasc band.  No hematoma or bleeding noted.  +2 mag radial pulses palpated. Skin normal in color, warm to touch, < 3 sec cap refill.   Will continue to monitor pt.

## 2019-07-12 NOTE — PLAN OF CARE
1030 Patient transferred to recovery cath lab slot 5 via stretcher with side rails up x2 .  Pt drowsy but able to follow commands. Pt is stable,connected to cardiac monitor.  VSS. Right radial vasc band in place with 12ml of air in band c.d.i. no bleeding or hematoma noted.  Right pedal sheath removed without incident.  No hematoma, no bleeding.    +2 mag radial pulses palpated, pedal pulses dopplered.  Skin normal in color and warm to touch, < 3 sec cap refill.  Fall risk precautions given and patient acknowledges.  AIDET completed to pt.  Will continue to monitor patient.  Updated pt's wife in sx waiting room.

## 2019-07-12 NOTE — NURSING
1345:  Patient ready to go home.  Right foot access site and Left radial access site are CDI, no hematoma, no numbness or tingling.Patient discharged home per MD.  Vital signs stable, Neuro intact. Patient denies pain.    tegaderm dressing CDI.  Patient VS stable, denies chest pain, right radial gauze/tegaderm CDI, no bleeding or hematoma.  +2 radial pulses, tolerated lunch with no nausea vomiting.  Iv removed with tip intact/ gauze dressing CDI.  Patient verbalized understanding of post procedure instructions, copy given.  Patient escorted downstairs in wheelchair to home with wife.

## 2019-07-12 NOTE — Clinical Note
60 ml injected throughout the case. 90 mL total wasted during the case. 150 mL total used in the case.

## 2019-07-12 NOTE — BRIEF OP NOTE
S/p right CFA revascularization for winsome IIb claudication       Assisted JOYS approach   L radial access for visualization   5-6 slender R PT access for delivery of therapy          Atherectomy with SilverHawk catheter   PTA with 7.0 x 40 mm Lutonix DCB          Plan:       DC home in 2 hours   Continue DAPT with aspirin + plavix   Intense statin therapy   Surveillance ELISABETH + US      Full report to follow

## 2019-07-12 NOTE — PLAN OF CARE
Patient up to ambulate without incident.  NO complaints of pain/tenderness.  No bleeding.  Will continue to monitor.

## 2019-07-12 NOTE — DISCHARGE INSTRUCTIONS
Discharge Instructions:    Do not drive a car, operate heavy equipment, care for a young child, etc for the next 12-24 hours.   Avoid drinking alcohol for 24 hours.  Do not make any important decisions for 24 hours.    Drink fluids to keep hydrated. Resume your usual diet as tolerated.     Rest for today then activity as tolerated.   Do not lift anything over 5 pounds for the first 3 days after procedure.    Remove dressing tomorrow then may shower with warm soapy water. Do not scrub site. Pat dry.   May apply bandaid for 2 days.  No tub baths.  Do not submerge wound in water for 3 days.     Call MD for any unrelieved pain, excessive nausea or vomiting, redness around site, bleeding, or pus or foul smelling drainage, or any other questions or concerns.    Go to the ER for any difficulty breathing or chest pain.      If either site swells or bleeds, hold direct pressure to area for 10 full minutes.   If either site continues to bleed, continue to hold pressure to site and have someone bring you to the ER.      Follow any additional instructions given to you by MD.      Call MD to schedule a follow up appointment, or follow up as instructed.

## 2019-07-12 NOTE — PLAN OF CARE
Slight shadowing noted on right access site dressing.  Light pressure applied for 10 minutes, hemostasis achieved. No hematoma.  No numbness or tingling.  D-stat/ tegaderm dressing applied. Patient denies any pain. Will continue to monitor.  The remaining air removed from left radial vasc band.  No bleeding, no hematoma, no numbness or tingling.  Tegaderm dressing applied. Will prepare patient to go home.

## 2019-07-14 ENCOUNTER — PATIENT MESSAGE (OUTPATIENT)
Dept: CARDIOLOGY | Facility: CLINIC | Age: 59
End: 2019-07-14

## 2019-07-14 DIAGNOSIS — I10 ESSENTIAL HYPERTENSION: ICD-10-CM

## 2019-07-16 ENCOUNTER — PATIENT MESSAGE (OUTPATIENT)
Dept: CARDIOLOGY | Facility: CLINIC | Age: 59
End: 2019-07-16

## 2019-07-16 RX ORDER — LOSARTAN POTASSIUM 100 MG/1
TABLET ORAL
Qty: 30 TABLET | Refills: 0 | Status: ON HOLD | OUTPATIENT
Start: 2019-07-16 | End: 2019-10-12 | Stop reason: SDUPTHER

## 2019-07-16 RX ORDER — HYDROCHLOROTHIAZIDE 25 MG/1
TABLET ORAL
Qty: 30 TABLET | Refills: 0 | Status: SHIPPED | OUTPATIENT
Start: 2019-07-16 | End: 2019-09-16 | Stop reason: SDUPTHER

## 2019-07-19 DIAGNOSIS — I77.9 CAROTID ARTERY DISEASE, UNSPECIFIED LATERALITY, UNSPECIFIED TYPE: Primary | ICD-10-CM

## 2019-07-20 DIAGNOSIS — E78.5 HYPERLIPIDEMIA, UNSPECIFIED HYPERLIPIDEMIA TYPE: ICD-10-CM

## 2019-07-20 RX ORDER — ATORVASTATIN CALCIUM 40 MG/1
TABLET, FILM COATED ORAL
Qty: 30 TABLET | Refills: 0 | Status: ON HOLD | OUTPATIENT
Start: 2019-07-20 | End: 2019-10-12 | Stop reason: SDUPTHER

## 2019-07-26 ENCOUNTER — TELEPHONE (OUTPATIENT)
Dept: CARDIOLOGY | Facility: HOSPITAL | Age: 59
End: 2019-07-26

## 2019-07-26 ENCOUNTER — PATIENT MESSAGE (OUTPATIENT)
Dept: CARDIOLOGY | Facility: CLINIC | Age: 59
End: 2019-07-26

## 2019-07-26 DIAGNOSIS — I73.9 PAD (PERIPHERAL ARTERY DISEASE): Primary | ICD-10-CM

## 2019-08-02 ENCOUNTER — HOSPITAL ENCOUNTER (OUTPATIENT)
Dept: VASCULAR SURGERY | Facility: CLINIC | Age: 59
Discharge: HOME | End: 2019-08-02
Attending: SURGERY
Payer: COMMERCIAL

## 2019-08-02 ENCOUNTER — OFFICE VISIT (OUTPATIENT)
Dept: VASCULAR SURGERY | Facility: CLINIC | Age: 59
End: 2019-08-02
Attending: SURGERY
Payer: COMMERCIAL

## 2019-08-02 VITALS
SYSTOLIC BLOOD PRESSURE: 143 MMHG | BODY MASS INDEX: 26.95 KG/M2 | TEMPERATURE: 98 F | DIASTOLIC BLOOD PRESSURE: 74 MMHG | HEART RATE: 70 BPM | HEIGHT: 70 IN | WEIGHT: 188.25 LBS

## 2019-08-02 DIAGNOSIS — I77.9 CAROTID ARTERY DISEASE, UNSPECIFIED LATERALITY, UNSPECIFIED TYPE: ICD-10-CM

## 2019-08-02 DIAGNOSIS — I70.202 FEMORAL ARTERY STENOSIS, LEFT: ICD-10-CM

## 2019-08-02 DIAGNOSIS — I65.23 BILATERAL CAROTID ARTERY STENOSIS: ICD-10-CM

## 2019-08-02 DIAGNOSIS — I70.201 FEMORAL ARTERY STENOSIS, RIGHT: Primary | ICD-10-CM

## 2019-08-02 PROCEDURE — 99214 PR OFFICE/OUTPT VISIT, EST, LEVL IV, 30-39 MIN: ICD-10-PCS | Mod: S$GLB,,, | Performed by: SURGERY

## 2019-08-02 PROCEDURE — 3078F PR MOST RECENT DIASTOLIC BLOOD PRESSURE < 80 MM HG: ICD-10-PCS | Mod: CPTII,S$GLB,, | Performed by: SURGERY

## 2019-08-02 PROCEDURE — 3008F BODY MASS INDEX DOCD: CPT | Mod: CPTII,S$GLB,, | Performed by: SURGERY

## 2019-08-02 PROCEDURE — 3077F SYST BP >= 140 MM HG: CPT | Mod: CPTII,S$GLB,, | Performed by: SURGERY

## 2019-08-02 PROCEDURE — 3078F DIAST BP <80 MM HG: CPT | Mod: CPTII,S$GLB,, | Performed by: SURGERY

## 2019-08-02 PROCEDURE — 99999 PR PBB SHADOW E&M-EST. PATIENT-LVL III: ICD-10-PCS | Mod: PBBFAC,,, | Performed by: SURGERY

## 2019-08-02 PROCEDURE — 3008F PR BODY MASS INDEX (BMI) DOCUMENTED: ICD-10-PCS | Mod: CPTII,S$GLB,, | Performed by: SURGERY

## 2019-08-02 PROCEDURE — 93880 PR DUPLEX SCAN EXTRACRANIAL,BILAT: ICD-10-PCS | Mod: S$GLB,,, | Performed by: SURGERY

## 2019-08-02 PROCEDURE — 99999 PR PBB SHADOW E&M-EST. PATIENT-LVL III: CPT | Mod: PBBFAC,,, | Performed by: SURGERY

## 2019-08-02 PROCEDURE — 93880 EXTRACRANIAL BILAT STUDY: CPT | Mod: S$GLB,,, | Performed by: SURGERY

## 2019-08-02 PROCEDURE — 3077F PR MOST RECENT SYSTOLIC BLOOD PRESSURE >= 140 MM HG: ICD-10-PCS | Mod: CPTII,S$GLB,, | Performed by: SURGERY

## 2019-08-02 PROCEDURE — 99214 OFFICE O/P EST MOD 30 MIN: CPT | Mod: S$GLB,,, | Performed by: SURGERY

## 2019-08-02 NOTE — PROGRESS NOTES
Domingo Gross Sr.  08/02/2019    HPI:  Patient is a 59 y.o. male who is here for evaluation of severe, lifestyle limiting claudication of the RLE.  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  He has undergone extensive endovascular revascularization of both lower extremities with Dr. Jenkins, including, per Dr. Jenkins's notes:    Bilateral SFA revascularization R (antegrade approach) (3/2018) and L (4/2018)- PTA with 6.0 mm Lutonix DCB. In 6/2017 he had L EIA PTAS for winsome IIb claudication. L SFA atherectomy with 2.2 Phoenix catheter with PTA using using 6.0 x 100 mm Lutonix DCB in 6/2015. He had distal aorta iliac reconstruction in 6/2014 with 8.0 x 39 mm BES overlapping with 9.0 x 60 in R EIA and 8.0 x 80 mm SES in L EIA post dilated with 9.0 mm bilaterally. No ulcers.     He also underwent LHC with PCI of mid LAD with 2.5 x 30 and 2.5 x 15 mm Resolute REZA PCI of proximal RCA to treat guide dissection with 3.5 x 22 Resolute REZA in 3/2019 and is on aspirin and plavix for DAPT.     Recent angiogram and CTA show 95+% stenosis of R CFA and he presents for surgical evaluation.     Interval History: He presents for f/u visit to discuss results of Carotid US. He denies FNDs, amaurosis fugax, changes in speech, CP, dyspnea.   He most recently had RLE Angio with CFA Atherectomy on 7/12/19. He notes improvement in his claudication since the most recent procedure.    He is taking ASA, Plavix, statin  He denies MI, though had + stress test with subsequent PCI; denies CVA/TIA  He denies smoking, quit in 1994    Past Medical History:   Diagnosis Date    Allergy     Arthritis     Coronary artery disease     Hemothorax     Hyperlipidemia     Hypertension     PAD (peripheral artery disease)      Past Surgical History:   Procedure Laterality Date    ANGIOGRAM, CORONARY ARTERY N/A 3/29/2019    Performed by Keo Jenkins [facility] at [facility] CATH LAB/EP     AORTOGRAM-ABDOMINAL N/A 5/10/2019    Performed [facility] at [facility] CATH LAB/EP    Atherectomy, Peripheral Blood Vessel N/A 2019    Performed by Keo Jenkins MD at [facility] CATH LAB/EP    CARDIAC CATHETERIZATION      EYE SURGERY      Left heart cath N/A 3/29/2019    Performed by Keo Jenkins MD at [facility] CATH LAB/EP    PTA, Femoral Artery  2019    Performed by Keo Jenkins MD at [facility] CATH LAB/EP    PTA, PERIPHERAL VESSEL N/A 2019    Performed by Keo Jenkins MD at [facility] CATH LAB/EP    PTCA, Single Vessel  3/29/2019    Performed by Keo Jenkins MD at [facility] CATH LAB/EP    Stent, Drug Eluting, Single Vessel, Coronary  3/29/2019    Performed by Keo Jenkins MD at [facility] CATH LAB/EP    Ultrasound-coronary N/A 2019    Performed by [facility]o Jenkins MD at [facility] CATH LAB/EP    Ultrasound-coronary N/A 3/29/2019    Performed by Keo Jenkins MD at [facility] CATH LAB/EP    Ventriculogram, Left  3/29/2019    Performed by Keo Jenkins MD at [facility] CATH LAB/EP     Family History   Problem Relation Age of Onset    Cancer Father      Social History     Socioeconomic History    Marital status:      Spouse name: Not on file    Number of children: Not on file    Years of education: Not on file    Highest education level: Not on file   Occupational History     Employer: [employer]   Social Needs    Financial resource strain: Not on file    Food insecurity:     Worry: Not on file     Inability: Not on file    Transportation needs:     Medical: Not on file     Non-medical: Not on file   Tobacco Use    Smoking status: Former Smoker     Types: Cigarettes     Last attempt to quit: 1999     Years since quittin.2    Smokeless tobacco: Never Used   Substance and Sexual Activity    Alcohol use: No     Alcohol/week: 0.0 oz    Drug use: No    Sexual activity: Not on file   Lifestyle    Physical activity:     Days per week: Not on file     Minutes per session: Not on file     Stress: Not on file   Relationships    Social connections:     Talks on phone: Not on file     Gets together: Not on file     Attends Rastafari service: Not on file     Active member of club or organization: Not on file     Attends meetings of clubs or organizations: Not on file     Relationship status: Not on file   Other Topics Concern    Not on file   Social History Narrative    Not on file     Current Outpatient Medications on File Prior to Visit   Medication Sig    albuterol (PROVENTIL/VENTOLIN HFA) 90 mcg/actuation inhaler INHALE 2 PUFFS INTO THE LUNGS EVERY 6 HOURS AS NEEDED FOR WHEEZING    ASPIRIN (ASPIR-81 ORAL) Take 1 tablet by mouth.    atorvastatin (LIPITOR) 40 MG tablet TAKE 1 TABLET(40 MG) BY MOUTH EVERY DAY    cilostazol (PLETAL) 100 MG Tab TAKE 1 TABLET(100 MG) BY MOUTH TWICE DAILY    clopidogrel (PLAVIX) 75 mg tablet Take 1 tablet (75 mg total) by mouth once daily.    flunisolide 25 mcg, 0.025%, (NASALIDE) 25 mcg (0.025 %) Spry 2 sprays by Nasal route 2 (two) times daily.    hydroCHLOROthiazide (HYDRODIURIL) 25 MG tablet TAKE 1 TABLET(25 MG) BY MOUTH EVERY DAY    losartan (COZAAR) 100 MG tablet TAKE 1 TABLET(100 MG) BY MOUTH EVERY DAY    metoprolol succinate (TOPROL-XL) 25 MG 24 hr tablet TAKE 1 TABLET(25 MG) BY MOUTH EVERY DAY    metoprolol succinate (TOPROL-XL) 25 MG 24 hr tablet TAKE 1 TABLET BY MOUTH EVERY DAY    coenzyme Q10 (COQ-10) 100 mg capsule Take 1 capsule (100 mg total) by mouth once daily.     No current facility-administered medications on file prior to visit.        REVIEW OF SYSTEMS:  General: negative; ENT: negative; Allergy and Immunology: negative; Hematological and Lymphatic: Negative; Endocrine: negative; Respiratory: no cough, shortness of breath, or wheezing; Cardiovascular: no chest pain or dyspnea on exertion; Gastrointestinal: no abdominal pain/back, change in bowel habits, or bloody stools; Genito-Urinary: no dysuria, trouble voiding, or hematuria;  Musculoskeletal: negative  Neurological: no TIA or stroke symptoms    PHYSICAL EXAM:   Right Arm BP - Sittin/86 (19 1439)  Left Arm BP - Sittin/74 (19 1439)  Pulse: 70  Temp: 98.1 °F (36.7 °C)      General appearance:  Alert, well-appearing, and in no distress.  Oriented to person, place, and time   Neurological: Normal speech, no focal findings noted; CN II - XII grossly intact           Musculoskeletal: Digits/nail without cyanosis/clubbing.  Normal muscle strength/tone.                 Neck: Supple, no significant adenopathy; thyroid is not enlarged                  No carotid bruit can be auscultated                Chest:  Clear to auscultation, no wheezes, rales or rhonchi, symmetric air entry     No use of accessory muscles             Cardiac: Normal rate and regular rhythm, S1 and S2 normal; PMI non-displaced          Abdomen: Soft, nontender, nondistended, no masses or organomegaly     No rebound tenderness noted; bowel sounds normal     Pulsatile aortic mass is not palpable.     No groin adenopathy      Extremities:   2+ femoral pulses     2+ pedal pulses palpable bilaterally     No pedal edema     No ulcerations    LAB RESULTS:  Lab Results   Component Value Date    K 2.8 (L) 2019    K 3.0 (L) 05/10/2019    K 2.7 (LL) 2019    CREATININE 1.3 2019    CREATININE 1.2 05/10/2019    CREATININE 1.2 2019     Lab Results   Component Value Date    WBC 5.72 2019    WBC 5.39 05/10/2019    WBC 7.70 2019    HCT 29.6 (L) 2019    HCT 29.6 (L) 05/10/2019    HCT 28.9 (L) 2019     2019     05/10/2019     2019     Lab Results   Component Value Date    HGBA1C 5.8 2013     IMAGING:    Previous CTA shows 95% R CFA stenosis - patient is now s/p R CFA EA with Dr. Jenkins    Vas US Carotid Bilateral 19:  ICA Stenosis 1-39% on R and L carotids    IMP/PLAN:  59 y.o. male with extensive cardiovascular history and recent  interventions.  His RLE claudication is significantly improved since atherectomy with Dr. Jenkins on 7/12/19.  Regarding f/u of bilateral carotid US, imaging shows no significant stenosis.      1) continue asa, plavix, statin  2) Based upon significant improvement in claudication after recent angio with Dr. Jenkins and need for Plavix s/p cardiac PCI, can delay R CFA endarterectomy   3) Will discuss case with Dr. Jenkins in the future  4) f/u in 6 months, or sooner prjeffrey Giron MD  Vascular & Endovascular Surgery

## 2019-08-05 ENCOUNTER — HOSPITAL ENCOUNTER (OUTPATIENT)
Dept: RADIOLOGY | Facility: HOSPITAL | Age: 59
Discharge: HOME | End: 2019-08-05
Attending: INTERNAL MEDICINE
Payer: COMMERCIAL

## 2019-08-05 ENCOUNTER — HOSPITAL ENCOUNTER (OUTPATIENT)
Dept: CARDIOLOGY | Facility: HOSPITAL | Age: 59
Discharge: HOME | End: 2019-08-05
Attending: INTERNAL MEDICINE
Payer: COMMERCIAL

## 2019-08-05 DIAGNOSIS — I73.9 PAD (PERIPHERAL ARTERY DISEASE): ICD-10-CM

## 2019-08-05 PROCEDURE — 93922 CV US ANKLE BRACHIAL INDICES RESTING (CUPID ONLY): ICD-10-PCS | Mod: 26,,, | Performed by: INTERNAL MEDICINE

## 2019-08-05 PROCEDURE — 93922 UPR/L XTREMITY ART 2 LEVELS: CPT | Mod: 26,,, | Performed by: INTERNAL MEDICINE

## 2019-08-05 PROCEDURE — 93925 LOWER EXTREMITY STUDY: CPT | Mod: TC,PO

## 2019-08-05 PROCEDURE — 93922 UPR/L XTREMITY ART 2 LEVELS: CPT | Mod: 50,PO

## 2019-08-06 ENCOUNTER — PATIENT MESSAGE (OUTPATIENT)
Dept: CARDIOLOGY | Facility: CLINIC | Age: 59
End: 2019-08-06

## 2019-08-06 LAB
LEFT ABI: 1.07
LEFT ARM BP: 163 MMHG
LEFT DORSALIS PEDIS: 171 MMHG
LEFT POSTERIOR TIBIAL: 175 MMHG
RIGHT ABI: 1.13
RIGHT ARM BP: 162 MMHG
RIGHT DORSALIS PEDIS: 176 MMHG
RIGHT POSTERIOR TIBIAL: 184 MMHG

## 2019-08-09 DIAGNOSIS — I10 ESSENTIAL HYPERTENSION: ICD-10-CM

## 2019-08-09 RX ORDER — METOPROLOL SUCCINATE 25 MG/1
TABLET, EXTENDED RELEASE ORAL
Qty: 90 TABLET | Refills: 0 | Status: ON HOLD | OUTPATIENT
Start: 2019-08-09 | End: 2019-10-12 | Stop reason: SDUPTHER

## 2019-08-12 ENCOUNTER — DOCUMENTATION ONLY (OUTPATIENT)
Dept: ADMINISTRATIVE | Facility: HOSPITAL | Age: 59
End: 2019-08-12

## 2019-08-19 DIAGNOSIS — I10 ESSENTIAL HYPERTENSION: ICD-10-CM

## 2019-08-19 DIAGNOSIS — E78.5 HYPERLIPIDEMIA, UNSPECIFIED HYPERLIPIDEMIA TYPE: ICD-10-CM

## 2019-08-19 RX ORDER — ATORVASTATIN CALCIUM 40 MG/1
TABLET, FILM COATED ORAL
Qty: 30 TABLET | Refills: 0 | OUTPATIENT
Start: 2019-08-19

## 2019-08-19 RX ORDER — HYDROCHLOROTHIAZIDE 25 MG/1
TABLET ORAL
Qty: 30 TABLET | Refills: 0 | OUTPATIENT
Start: 2019-08-19

## 2019-08-19 RX ORDER — CLOPIDOGREL BISULFATE 75 MG/1
TABLET ORAL
Qty: 90 TABLET | Refills: 0 | OUTPATIENT
Start: 2019-08-19

## 2019-08-19 RX ORDER — LOSARTAN POTASSIUM 100 MG/1
TABLET ORAL
Qty: 30 TABLET | Refills: 0 | OUTPATIENT
Start: 2019-08-19

## 2019-09-03 DIAGNOSIS — I10 ESSENTIAL HYPERTENSION: ICD-10-CM

## 2019-09-03 RX ORDER — METOPROLOL SUCCINATE 25 MG/1
TABLET, EXTENDED RELEASE ORAL
Qty: 90 TABLET | Refills: 0 | OUTPATIENT
Start: 2019-09-03

## 2019-09-16 DIAGNOSIS — I10 ESSENTIAL HYPERTENSION: ICD-10-CM

## 2019-09-16 RX ORDER — HYDROCHLOROTHIAZIDE 25 MG/1
25 TABLET ORAL DAILY
Qty: 30 TABLET | Refills: 0 | Status: ON HOLD | OUTPATIENT
Start: 2019-09-16 | End: 2019-10-12 | Stop reason: SDUPTHER

## 2019-09-16 RX ORDER — CLOPIDOGREL BISULFATE 75 MG/1
75 TABLET ORAL DAILY
Qty: 30 TABLET | Refills: 0 | Status: ON HOLD | OUTPATIENT
Start: 2019-09-16 | End: 2019-10-12 | Stop reason: SDUPTHER

## 2019-10-04 ENCOUNTER — HOSPITAL ENCOUNTER (INPATIENT)
Facility: HOSPITAL | Age: 59
LOS: 8 days | Discharge: HOME | DRG: 286 | End: 2019-10-12
Attending: EMERGENCY MEDICINE | Admitting: INTERNAL MEDICINE
Payer: COMMERCIAL

## 2019-10-04 DIAGNOSIS — I46.9 CARDIAC ARREST: Primary | ICD-10-CM

## 2019-10-04 DIAGNOSIS — I25.111 CORONARY ARTERY DISEASE INVOLVING NATIVE CORONARY ARTERY OF NATIVE HEART WITH ANGINA PECTORIS WITH DOCUMENTED SPASM: ICD-10-CM

## 2019-10-04 DIAGNOSIS — I10 ESSENTIAL HYPERTENSION: ICD-10-CM

## 2019-10-04 DIAGNOSIS — I25.10 CAD (CORONARY ARTERY DISEASE): ICD-10-CM

## 2019-10-04 DIAGNOSIS — I48.91 ATRIAL FIBRILLATION WITH RVR: ICD-10-CM

## 2019-10-04 DIAGNOSIS — Z98.890 STATUS POST CARDIAC CATHETERIZATION: ICD-10-CM

## 2019-10-04 DIAGNOSIS — E78.5 HYPERLIPIDEMIA, UNSPECIFIED HYPERLIPIDEMIA TYPE: ICD-10-CM

## 2019-10-04 DIAGNOSIS — R55 SYNCOPE, UNSPECIFIED SYNCOPE TYPE: ICD-10-CM

## 2019-10-04 DIAGNOSIS — R07.9 CHEST PAIN: ICD-10-CM

## 2019-10-04 DIAGNOSIS — I21.3 ST ELEVATION MYOCARDIAL INFARCTION (STEMI), UNSPECIFIED ARTERY: ICD-10-CM

## 2019-10-04 PROBLEM — N17.9 ACUTE RENAL FAILURE: Status: ACTIVE | Noted: 2019-10-04

## 2019-10-04 PROBLEM — E87.6 HYPOKALEMIA: Status: ACTIVE | Noted: 2019-10-04

## 2019-10-04 LAB
ALBUMIN SERPL BCP-MCNC: 2.5 G/DL (ref 3.5–5.2)
ALP SERPL-CCNC: 80 U/L (ref 55–135)
ALT SERPL W/O P-5'-P-CCNC: 16 U/L (ref 10–44)
ANION GAP SERPL CALC-SCNC: 16 MMOL/L (ref 8–16)
APTT BLDCRRT: 21.5 SEC (ref 21–32)
APTT BLDCRRT: 24.1 SEC (ref 21–32)
AST SERPL-CCNC: 45 U/L (ref 10–40)
BASOPHILS # BLD AUTO: 0.02 K/UL (ref 0–0.2)
BASOPHILS # BLD AUTO: 0.03 K/UL (ref 0–0.2)
BASOPHILS NFR BLD: 0.2 % (ref 0–1.9)
BASOPHILS NFR BLD: 0.3 % (ref 0–1.9)
BILIRUB SERPL-MCNC: 0.4 MG/DL (ref 0.1–1)
BNP SERPL-MCNC: 99 PG/ML (ref 0–99)
BUN SERPL-MCNC: 25 MG/DL (ref 6–20)
CALCIUM SERPL-MCNC: 8.3 MG/DL (ref 8.7–10.5)
CHLORIDE SERPL-SCNC: 104 MMOL/L (ref 95–110)
CO2 SERPL-SCNC: 21 MMOL/L (ref 23–29)
CREAT SERPL-MCNC: 2.6 MG/DL (ref 0.5–1.4)
DIFFERENTIAL METHOD: ABNORMAL
DIFFERENTIAL METHOD: ABNORMAL
EOSINOPHIL # BLD AUTO: 0.1 K/UL (ref 0–0.5)
EOSINOPHIL # BLD AUTO: 0.1 K/UL (ref 0–0.5)
EOSINOPHIL NFR BLD: 0.6 % (ref 0–8)
EOSINOPHIL NFR BLD: 0.9 % (ref 0–8)
ERYTHROCYTE [DISTWIDTH] IN BLOOD BY AUTOMATED COUNT: 12.7 % (ref 11.5–14.5)
ERYTHROCYTE [DISTWIDTH] IN BLOOD BY AUTOMATED COUNT: 12.8 % (ref 11.5–14.5)
EST. GFR  (AFRICAN AMERICAN): 30 ML/MIN/1.73 M^2
EST. GFR  (NON AFRICAN AMERICAN): 26 ML/MIN/1.73 M^2
GLUCOSE SERPL-MCNC: 136 MG/DL (ref 70–110)
HCT VFR BLD AUTO: 28.1 % (ref 40–54)
HCT VFR BLD AUTO: 28.8 % (ref 40–54)
HGB BLD-MCNC: 9.5 G/DL (ref 14–18)
HGB BLD-MCNC: 9.9 G/DL (ref 14–18)
INR PPP: 1.1 (ref 0.8–1.2)
INR PPP: 1.1 (ref 0.8–1.2)
LYMPHOCYTES # BLD AUTO: 1.2 K/UL (ref 1–4.8)
LYMPHOCYTES # BLD AUTO: 1.3 K/UL (ref 1–4.8)
LYMPHOCYTES NFR BLD: 14 % (ref 18–48)
LYMPHOCYTES NFR BLD: 14.1 % (ref 18–48)
MAGNESIUM SERPL-MCNC: 1.7 MG/DL (ref 1.6–2.6)
MCH RBC QN AUTO: 26.8 PG (ref 27–31)
MCH RBC QN AUTO: 27.3 PG (ref 27–31)
MCHC RBC AUTO-ENTMCNC: 33.8 G/DL (ref 32–36)
MCHC RBC AUTO-ENTMCNC: 34.4 G/DL (ref 32–36)
MCV RBC AUTO: 79 FL (ref 82–98)
MCV RBC AUTO: 79 FL (ref 82–98)
MONOCYTES # BLD AUTO: 0.7 K/UL (ref 0.3–1)
MONOCYTES # BLD AUTO: 0.7 K/UL (ref 0.3–1)
MONOCYTES NFR BLD: 8 % (ref 4–15)
MONOCYTES NFR BLD: 8 % (ref 4–15)
NEUTROPHILS # BLD AUTO: 6.7 K/UL (ref 1.8–7.7)
NEUTROPHILS # BLD AUTO: 6.9 K/UL (ref 1.8–7.7)
NEUTROPHILS NFR BLD: 76.7 % (ref 38–73)
NEUTROPHILS NFR BLD: 77.2 % (ref 38–73)
PLATELET # BLD AUTO: 260 K/UL (ref 150–350)
PLATELET # BLD AUTO: 271 K/UL (ref 150–350)
PMV BLD AUTO: 9.4 FL (ref 9.2–12.9)
PMV BLD AUTO: 9.6 FL (ref 9.2–12.9)
POTASSIUM SERPL-SCNC: 2.6 MMOL/L (ref 3.5–5.1)
PROT SERPL-MCNC: 5.3 G/DL (ref 6–8.4)
PROTHROMBIN TIME: 10.9 SEC (ref 9–12.5)
PROTHROMBIN TIME: 11.1 SEC (ref 9–12.5)
RBC # BLD AUTO: 3.55 M/UL (ref 4.6–6.2)
RBC # BLD AUTO: 3.63 M/UL (ref 4.6–6.2)
SODIUM SERPL-SCNC: 141 MMOL/L (ref 136–145)
TROPONIN I SERPL DL<=0.01 NG/ML-MCNC: 8.51 NG/ML (ref 0–0.03)
WBC # BLD AUTO: 8.71 K/UL (ref 3.9–12.7)
WBC # BLD AUTO: 8.93 K/UL (ref 3.9–12.7)

## 2019-10-04 PROCEDURE — 83880 ASSAY OF NATRIURETIC PEPTIDE: CPT

## 2019-10-04 PROCEDURE — 93005 ELECTROCARDIOGRAM TRACING: CPT

## 2019-10-04 PROCEDURE — 25000003 PHARM REV CODE 250: Performed by: INTERNAL MEDICINE

## 2019-10-04 PROCEDURE — 84484 ASSAY OF TROPONIN QUANT: CPT

## 2019-10-04 PROCEDURE — 99291 CRITICAL CARE FIRST HOUR: CPT | Mod: ,,, | Performed by: INTERNAL MEDICINE

## 2019-10-04 PROCEDURE — 80053 COMPREHEN METABOLIC PANEL: CPT

## 2019-10-04 PROCEDURE — 83735 ASSAY OF MAGNESIUM: CPT

## 2019-10-04 PROCEDURE — 85610 PROTHROMBIN TIME: CPT | Mod: 91

## 2019-10-04 PROCEDURE — 36415 COLL VENOUS BLD VENIPUNCTURE: CPT

## 2019-10-04 PROCEDURE — 85730 THROMBOPLASTIN TIME PARTIAL: CPT | Mod: 91

## 2019-10-04 PROCEDURE — 85025 COMPLETE CBC W/AUTO DIFF WBC: CPT | Mod: 91

## 2019-10-04 PROCEDURE — 63600175 PHARM REV CODE 636 W HCPCS: Performed by: INTERNAL MEDICINE

## 2019-10-04 PROCEDURE — 99291 CRITICAL CARE FIRST HOUR: CPT

## 2019-10-04 PROCEDURE — 85610 PROTHROMBIN TIME: CPT

## 2019-10-04 PROCEDURE — 85730 THROMBOPLASTIN TIME PARTIAL: CPT

## 2019-10-04 PROCEDURE — 20000000 HC ICU ROOM

## 2019-10-04 PROCEDURE — 99291 PR CRITICAL CARE, E/M 30-74 MINUTES: ICD-10-PCS | Mod: ,,, | Performed by: INTERNAL MEDICINE

## 2019-10-04 RX ORDER — POTASSIUM CHLORIDE 20 MEQ/1
60 TABLET, EXTENDED RELEASE ORAL ONCE
Status: COMPLETED | OUTPATIENT
Start: 2019-10-05 | End: 2019-10-05

## 2019-10-04 RX ORDER — HEPARIN SODIUM,PORCINE/D5W 25000/250
12 INTRAVENOUS SOLUTION INTRAVENOUS CONTINUOUS
Status: DISCONTINUED | OUTPATIENT
Start: 2019-10-04 | End: 2019-10-08

## 2019-10-04 RX ORDER — CILOSTAZOL 50 MG/1
100 TABLET ORAL 2 TIMES DAILY
Status: DISCONTINUED | OUTPATIENT
Start: 2019-10-04 | End: 2019-10-12 | Stop reason: HOSPADM

## 2019-10-04 RX ORDER — METOPROLOL SUCCINATE 50 MG/1
50 TABLET, EXTENDED RELEASE ORAL DAILY
Status: DISCONTINUED | OUTPATIENT
Start: 2019-10-04 | End: 2019-10-06

## 2019-10-04 RX ORDER — ATORVASTATIN CALCIUM 40 MG/1
40 TABLET, FILM COATED ORAL NIGHTLY
Status: DISCONTINUED | OUTPATIENT
Start: 2019-10-04 | End: 2019-10-12 | Stop reason: HOSPADM

## 2019-10-04 RX ORDER — MORPHINE SULFATE 2 MG/ML
2 INJECTION, SOLUTION INTRAMUSCULAR; INTRAVENOUS
Status: DISCONTINUED | OUTPATIENT
Start: 2019-10-04 | End: 2019-10-06

## 2019-10-04 RX ORDER — ASPIRIN 81 MG/1
81 TABLET ORAL DAILY
Status: DISCONTINUED | OUTPATIENT
Start: 2019-10-05 | End: 2019-10-12 | Stop reason: HOSPADM

## 2019-10-04 RX ORDER — ONDANSETRON 8 MG/1
8 TABLET, ORALLY DISINTEGRATING ORAL EVERY 8 HOURS PRN
Status: DISCONTINUED | OUTPATIENT
Start: 2019-10-04 | End: 2019-10-06

## 2019-10-04 RX ORDER — CLOPIDOGREL BISULFATE 75 MG/1
75 TABLET ORAL DAILY
Status: DISCONTINUED | OUTPATIENT
Start: 2019-10-05 | End: 2019-10-12 | Stop reason: HOSPADM

## 2019-10-04 RX ORDER — POTASSIUM CHLORIDE 20 MEQ/1
60 TABLET, EXTENDED RELEASE ORAL ONCE
Status: COMPLETED | OUTPATIENT
Start: 2019-10-04 | End: 2019-10-04

## 2019-10-04 RX ORDER — ACETAMINOPHEN 325 MG/1
650 TABLET ORAL EVERY 4 HOURS PRN
Status: DISCONTINUED | OUTPATIENT
Start: 2019-10-04 | End: 2019-10-06

## 2019-10-04 RX ORDER — SODIUM CHLORIDE 9 MG/ML
INJECTION, SOLUTION INTRAVENOUS CONTINUOUS
Status: ACTIVE | OUTPATIENT
Start: 2019-10-05 | End: 2019-10-05

## 2019-10-04 RX ADMIN — SODIUM CHLORIDE 442.5 ML: 0.9 INJECTION, SOLUTION INTRAVENOUS at 10:10

## 2019-10-04 RX ADMIN — POTASSIUM CHLORIDE 60 MEQ: 20 TABLET, EXTENDED RELEASE ORAL at 08:10

## 2019-10-04 RX ADMIN — METOPROLOL SUCCINATE 50 MG: 50 TABLET, EXTENDED RELEASE ORAL at 10:10

## 2019-10-04 RX ADMIN — HEPARIN SODIUM AND DEXTROSE 12 UNITS/KG/HR: 10000; 5 INJECTION INTRAVENOUS at 10:10

## 2019-10-04 RX ADMIN — CILOSTAZOL 100 MG: 50 TABLET ORAL at 10:10

## 2019-10-04 RX ADMIN — ATORVASTATIN CALCIUM 40 MG: 40 TABLET, FILM COATED ORAL at 10:10

## 2019-10-04 NOTE — LETTER
October 5, 2019         [address] [place] [address] [place] [address]  Phone: [phone]  Fax: [phone]       Patient: Domingo Gross   YOB: 1960  Date of Visit: 10/05/2019    To Whom It May Concern:    Dax Gross  Is currently at [facility] and his son Mr. Domingo Gross Kamar was at the hospital to visit him on 10/4/2019 and 10/05/2019. He can return to work tomorrow 10/6/2019 without restrictions. If you have any questions or concerns, or if I can be of further assistance, please do not hesitate to contact me.    Sincerely,    Keo Jenkins MD

## 2019-10-04 NOTE — Clinical Note
Catheter is inserted into the proximal   circumflex. Angiography performed of the left coronary arteries. Angiography performed via hand injection with 6 mL of contrast.

## 2019-10-04 NOTE — Clinical Note
Catheter is inserted into the ostium   left main. Angiography performed of the left coronary arteries in multiple views. Angiography performed via hand injection with 22 mL of contrast.

## 2019-10-04 NOTE — Clinical Note
dry, intact, no bleeding and no hematoma. Left femoral.  Pressure bag in place.  .  Sheath will be pulled in pre/post  When ACT is <180.

## 2019-10-04 NOTE — Clinical Note
Catheter is repositioned to the aorta. Angiography performed of the left coronary arteries in multiple views. Angiography performed via hand injection with 10 mL of contrast.

## 2019-10-04 NOTE — Clinical Note
Catheter is inserted into the ostium   right coronary artery. Angiography performed of the right coronary arteries. Angiography performed via hand injection with 10 mL of contrast.

## 2019-10-05 LAB
ALBUMIN SERPL BCP-MCNC: 2.2 G/DL (ref 3.5–5.2)
ALP SERPL-CCNC: 70 U/L (ref 55–135)
ALT SERPL W/O P-5'-P-CCNC: 17 U/L (ref 10–44)
ANION GAP SERPL CALC-SCNC: 11 MMOL/L (ref 8–16)
APTT BLDCRRT: 38.2 SEC (ref 21–32)
APTT BLDCRRT: 38.3 SEC (ref 21–32)
APTT BLDCRRT: 39.9 SEC (ref 21–32)
APTT BLDCRRT: 39.9 SEC (ref 21–32)
AST SERPL-CCNC: 60 U/L (ref 10–40)
BASOPHILS # BLD AUTO: 0.03 K/UL (ref 0–0.2)
BASOPHILS # BLD AUTO: 0.03 K/UL (ref 0–0.2)
BASOPHILS NFR BLD: 0.3 % (ref 0–1.9)
BASOPHILS NFR BLD: 0.4 % (ref 0–1.9)
BILIRUB SERPL-MCNC: 0.4 MG/DL (ref 0.1–1)
BUN SERPL-MCNC: 26 MG/DL (ref 6–20)
CALCIUM SERPL-MCNC: 8 MG/DL (ref 8.7–10.5)
CHLORIDE SERPL-SCNC: 110 MMOL/L (ref 95–110)
CK SERPL-CCNC: 1202 U/L (ref 20–200)
CO2 SERPL-SCNC: 21 MMOL/L (ref 23–29)
CREAT SERPL-MCNC: 2.2 MG/DL (ref 0.5–1.4)
DIFFERENTIAL METHOD: ABNORMAL
DIFFERENTIAL METHOD: ABNORMAL
EOSINOPHIL # BLD AUTO: 0.1 K/UL (ref 0–0.5)
EOSINOPHIL # BLD AUTO: 0.1 K/UL (ref 0–0.5)
EOSINOPHIL NFR BLD: 0.9 % (ref 0–8)
EOSINOPHIL NFR BLD: 1.8 % (ref 0–8)
ERYTHROCYTE [DISTWIDTH] IN BLOOD BY AUTOMATED COUNT: 12.8 % (ref 11.5–14.5)
ERYTHROCYTE [DISTWIDTH] IN BLOOD BY AUTOMATED COUNT: 12.9 % (ref 11.5–14.5)
EST. GFR  (AFRICAN AMERICAN): 37 ML/MIN/1.73 M^2
EST. GFR  (NON AFRICAN AMERICAN): 32 ML/MIN/1.73 M^2
GLUCOSE SERPL-MCNC: 110 MG/DL (ref 70–110)
HCT VFR BLD AUTO: 26.9 % (ref 40–54)
HCT VFR BLD AUTO: 27.2 % (ref 40–54)
HGB BLD-MCNC: 9.1 G/DL (ref 14–18)
HGB BLD-MCNC: 9.1 G/DL (ref 14–18)
LYMPHOCYTES # BLD AUTO: 1.6 K/UL (ref 1–4.8)
LYMPHOCYTES # BLD AUTO: 1.8 K/UL (ref 1–4.8)
LYMPHOCYTES NFR BLD: 20.3 % (ref 18–48)
LYMPHOCYTES NFR BLD: 21.8 % (ref 18–48)
MCH RBC QN AUTO: 26.7 PG (ref 27–31)
MCH RBC QN AUTO: 27.1 PG (ref 27–31)
MCHC RBC AUTO-ENTMCNC: 33.5 G/DL (ref 32–36)
MCHC RBC AUTO-ENTMCNC: 33.8 G/DL (ref 32–36)
MCV RBC AUTO: 80 FL (ref 82–98)
MCV RBC AUTO: 80 FL (ref 82–98)
MONOCYTES # BLD AUTO: 0.7 K/UL (ref 0.3–1)
MONOCYTES # BLD AUTO: 0.7 K/UL (ref 0.3–1)
MONOCYTES NFR BLD: 10 % (ref 4–15)
MONOCYTES NFR BLD: 7.8 % (ref 4–15)
NEUTROPHILS # BLD AUTO: 4.7 K/UL (ref 1.8–7.7)
NEUTROPHILS # BLD AUTO: 6.2 K/UL (ref 1.8–7.7)
NEUTROPHILS NFR BLD: 66 % (ref 38–73)
NEUTROPHILS NFR BLD: 70.7 % (ref 38–73)
PLATELET # BLD AUTO: 232 K/UL (ref 150–350)
PLATELET # BLD AUTO: 261 K/UL (ref 150–350)
PMV BLD AUTO: 9.5 FL (ref 9.2–12.9)
PMV BLD AUTO: 9.6 FL (ref 9.2–12.9)
POTASSIUM SERPL-SCNC: 3.3 MMOL/L (ref 3.5–5.1)
PROT SERPL-MCNC: 5.2 G/DL (ref 6–8.4)
RBC # BLD AUTO: 3.36 M/UL (ref 4.6–6.2)
RBC # BLD AUTO: 3.41 M/UL (ref 4.6–6.2)
SODIUM SERPL-SCNC: 142 MMOL/L (ref 136–145)
TROPONIN I SERPL DL<=0.01 NG/ML-MCNC: 27.27 NG/ML (ref 0–0.03)
TSH SERPL DL<=0.005 MIU/L-ACNC: 3.42 UIU/ML (ref 0.4–4)
WBC # BLD AUTO: 7.1 K/UL (ref 3.9–12.7)
WBC # BLD AUTO: 8.79 K/UL (ref 3.9–12.7)

## 2019-10-05 PROCEDURE — 85025 COMPLETE CBC W/AUTO DIFF WBC: CPT | Mod: 91

## 2019-10-05 PROCEDURE — 80053 COMPREHEN METABOLIC PANEL: CPT

## 2019-10-05 PROCEDURE — 85730 THROMBOPLASTIN TIME PARTIAL: CPT | Mod: 91

## 2019-10-05 PROCEDURE — 82550 ASSAY OF CK (CPK): CPT

## 2019-10-05 PROCEDURE — 63600175 PHARM REV CODE 636 W HCPCS: Performed by: INTERNAL MEDICINE

## 2019-10-05 PROCEDURE — 25000003 PHARM REV CODE 250: Performed by: INTERNAL MEDICINE

## 2019-10-05 PROCEDURE — 99291 CRITICAL CARE FIRST HOUR: CPT | Mod: ,,, | Performed by: INTERNAL MEDICINE

## 2019-10-05 PROCEDURE — 84443 ASSAY THYROID STIM HORMONE: CPT

## 2019-10-05 PROCEDURE — 94761 N-INVAS EAR/PLS OXIMETRY MLT: CPT

## 2019-10-05 PROCEDURE — 84484 ASSAY OF TROPONIN QUANT: CPT

## 2019-10-05 PROCEDURE — 20000000 HC ICU ROOM

## 2019-10-05 PROCEDURE — 36415 COLL VENOUS BLD VENIPUNCTURE: CPT

## 2019-10-05 PROCEDURE — 99291 PR CRITICAL CARE, E/M 30-74 MINUTES: ICD-10-PCS | Mod: ,,, | Performed by: INTERNAL MEDICINE

## 2019-10-05 RX ORDER — POTASSIUM CHLORIDE 20 MEQ/1
60 TABLET, EXTENDED RELEASE ORAL ONCE
Status: COMPLETED | OUTPATIENT
Start: 2019-10-05 | End: 2019-10-05

## 2019-10-05 RX ORDER — HYDRALAZINE HYDROCHLORIDE 25 MG/1
25 TABLET, FILM COATED ORAL EVERY 8 HOURS
Status: DISCONTINUED | OUTPATIENT
Start: 2019-10-05 | End: 2019-10-05

## 2019-10-05 RX ORDER — SODIUM CHLORIDE 9 MG/ML
INJECTION, SOLUTION INTRAVENOUS CONTINUOUS
Status: DISCONTINUED | OUTPATIENT
Start: 2019-10-05 | End: 2019-10-07

## 2019-10-05 RX ORDER — HYDRALAZINE HYDROCHLORIDE 20 MG/ML
10 INJECTION INTRAMUSCULAR; INTRAVENOUS EVERY 8 HOURS PRN
Status: DISCONTINUED | OUTPATIENT
Start: 2019-10-05 | End: 2019-10-12 | Stop reason: HOSPADM

## 2019-10-05 RX ADMIN — CLOPIDOGREL BISULFATE 75 MG: 75 TABLET ORAL at 09:10

## 2019-10-05 RX ADMIN — HEPARIN SODIUM AND DEXTROSE 14 UNITS/KG/HR: 10000; 5 INJECTION INTRAVENOUS at 02:10

## 2019-10-05 RX ADMIN — POTASSIUM CHLORIDE 60 MEQ: 20 TABLET, EXTENDED RELEASE ORAL at 03:10

## 2019-10-05 RX ADMIN — Medication 81 MG: at 09:10

## 2019-10-05 RX ADMIN — SODIUM CHLORIDE: 0.9 INJECTION, SOLUTION INTRAVENOUS at 09:10

## 2019-10-05 RX ADMIN — CILOSTAZOL 100 MG: 50 TABLET ORAL at 09:10

## 2019-10-05 RX ADMIN — SODIUM CHLORIDE: 0.9 INJECTION, SOLUTION INTRAVENOUS at 01:10

## 2019-10-05 RX ADMIN — SODIUM CHLORIDE: 0.9 INJECTION, SOLUTION INTRAVENOUS at 07:10

## 2019-10-05 RX ADMIN — POTASSIUM CHLORIDE 60 MEQ: 20 TABLET, EXTENDED RELEASE ORAL at 09:10

## 2019-10-05 RX ADMIN — ATORVASTATIN CALCIUM 40 MG: 40 TABLET, FILM COATED ORAL at 09:10

## 2019-10-05 RX ADMIN — METOPROLOL SUCCINATE 50 MG: 50 TABLET, EXTENDED RELEASE ORAL at 09:10

## 2019-10-05 NOTE — H&P
"[facility]  Cardiology  History and Physical     Patient Name: Domingo Gross Sr.  MRN: [id number]  Admission Date: 10/4/2019  Code Status: No Order   Attending Provider: Keo Jenkins  Primary Care Physician: Analy Encarnacion MD  Principal Problem:Cardiac arrest    Patient information was obtained from patient and chart review     Subjective:     Chief Complaint:  " cardiac arrest"      HPI:       59 year old male with HTN, HLP, PAD, CAD s/p RCA PCI (3/2019) + preserved EF brought to the ED by EMS after a witnessed cardiac arrest at an event. He spent the a good part of the day outdoor working on a fence with Temp  F. Per EMS report no pulse on arrival then CPR then regained ROSC after one shock. ECG from EMS and on arrival at [facility] revealed afib with RVR + inferior lateral leads ST depression. He denied any palpitations or chest pain.       EMS treatment: 150 mg amiodarone    No previous history of afib      Pertinent labs:      K 2.6   Cr 2.6   TNI 8.5   H/H 9.9/28.8    BNP 99    CO2 21        Past Medical History:   Diagnosis Date    Allergy     Arthritis     Coronary artery disease     Hemothorax     Hyperlipidemia     Hypertension     PAD (peripheral artery disease)        Past Surgical History:   Procedure Laterality Date    ABDOMINAL AORTOGRAPHY N/A 5/10/2019    Procedure: AORTOGRAM-ABDOMINAL;  Surgeon: Keo Jenkins MD;  Location: [facility] CATH LAB/EP;  Service: Cardiology;  Laterality: N/A;  RSFA intervention     CARDIAC CATHETERIZATION      CORONARY ANGIOGRAPHY N/A 3/29/2019    Procedure: ANGIOGRAM, CORONARY ARTERY;  Surgeon: Keo Jenkins MD;  Location: [facility] CATH LAB/EP;  Service: Cardiology;  Laterality: N/A;    CORONARY ANGIOPLASTY WITH STENT PLACEMENT  03/29/2019    mid and distal RCA    EYE SURGERY      LEFT HEART CATHETERIZATION N/A 3/29/2019    Procedure: Left heart cath;  Surgeon: Keo Jenkins MD;  Location: [facility] CATH LAB/EP;  Service: Cardiology;  " Laterality: N/A;    PERCUTANEOUS TRANSLUMINAL ANGIOPLASTY (PTA) OF PERIPHERAL VESSEL N/A 2019    Procedure: PTA, PERIPHERAL VESSEL;  Surgeon: Keo Jenkins MD;  Location: [facility] CATH LAB/EP;  Service: Cardiology;  Laterality: N/A;       Review of patient's allergies indicates:   Allergen Reactions    Ace inhibitors Rash       No current facility-administered medications on file prior to encounter.      Current Outpatient Medications on File Prior to Encounter   Medication Sig    ASPIRIN (ASPIR-81 ORAL) Take 1 tablet by mouth.    atorvastatin (LIPITOR) 40 MG tablet TAKE 1 TABLET(40 MG) BY MOUTH EVERY DAY    cilostazol (PLETAL) 100 MG Tab TAKE 1 TABLET(100 MG) BY MOUTH TWICE DAILY    clopidogrel (PLAVIX) 75 mg tablet Take 1 tablet (75 mg total) by mouth once daily.    coenzyme Q10 (COQ-10) 100 mg capsule Take 1 capsule (100 mg total) by mouth once daily.    hydroCHLOROthiazide (HYDRODIURIL) 25 MG tablet Take 1 tablet (25 mg total) by mouth once daily.    losartan (COZAAR) 100 MG tablet TAKE 1 TABLET(100 MG) BY MOUTH EVERY DAY    metoprolol succinate (TOPROL-XL) 25 MG 24 hr tablet TAKE 1 TABLET BY MOUTH EVERY DAY    albuterol (PROVENTIL/VENTOLIN HFA) 90 mcg/actuation inhaler INHALE 2 PUFFS INTO THE LUNGS EVERY 6 HOURS AS NEEDED FOR WHEEZING    flunisolide 25 mcg, 0.025%, (NASALIDE) 25 mcg (0.025 %) Spry 2 sprays by Nasal route 2 (two) times daily.     Family History     Problem Relation (Age of Onset)    Cancer Father        Tobacco Use    Smoking status: Former Smoker     Types: Cigarettes     Last attempt to quit: 1999     Years since quittin.4    Smokeless tobacco: Never Used   Substance and Sexual Activity    Alcohol use: No     Alcohol/week: 0.0 standard drinks    Drug use: No    Sexual activity: Yes     Partners: Female     Review of Systems   Constitution: Negative for diaphoresis, night sweats, weight gain and weight loss.   HENT: Negative for congestion.    Eyes: Negative for  blurred vision, discharge and double vision.   Cardiovascular: Positive for syncope. Negative for chest pain, claudication, cyanosis, dyspnea on exertion, irregular heartbeat, leg swelling, near-syncope, orthopnea, palpitations and paroxysmal nocturnal dyspnea.   Respiratory: Negative for cough, shortness of breath and wheezing.    Endocrine: Negative for cold intolerance, heat intolerance and polyphagia.   Hematologic/Lymphatic: Negative for adenopathy and bleeding problem. Does not bruise/bleed easily.   Skin: Negative for dry skin and nail changes.   Musculoskeletal: Negative for arthritis, back pain, falls, joint pain, myalgias and neck pain.   Gastrointestinal: Negative for bloating, abdominal pain, change in bowel habit and constipation.   Genitourinary: Negative for bladder incontinence, dysuria, flank pain, genital sores and missed menses.   Neurological: Negative for aphonia, brief paralysis, difficulty with concentration, dizziness and weakness.   Psychiatric/Behavioral: Negative for altered mental status and memory loss. The patient does not have insomnia.    Allergic/Immunologic: Negative for environmental allergies.     Objective:     Vital Signs (Most Recent):  Temp: 98.6 °F (37 °C) (10/04/19 1928)  Pulse: 92 (10/04/19 2000)  Resp: 18 (10/04/19 2000)  BP: 128/69 (10/04/19 1921)  SpO2: 100 % (10/04/19 2000) Vital Signs (24h Range):  Temp:  [98.6 °F (37 °C)] 98.6 °F (37 °C)  Pulse:  [] 92  Resp:  [18-20] 18  SpO2:  [100 %] 100 %  BP: (128)/(69) 128/69     Weight: 88.5 kg (195 lb)  Body mass index is 27.98 kg/m².    SpO2: 100 %  O2 Device (Oxygen Therapy): nasal cannula    No intake or output data in the 24 hours ending 10/04/19 2052    Lines/Drains/Airways     Peripheral Intravenous Line                 Peripheral IV - Single Lumen 07/12/19 0615 20 G Right Antecubital 84 days                Physical Exam   Constitutional: He is oriented to person, place, and time. He appears well-developed and  well-nourished. He is not intubated.   HENT:   Head: Normocephalic and atraumatic.   Right Ear: External ear normal.   Left Ear: External ear normal.   Mouth/Throat: Oropharynx is clear and moist.   Eyes: Pupils are equal, round, and reactive to light. Conjunctivae and EOM are normal. Right eye exhibits no discharge. Left eye exhibits no discharge. No scleral icterus.   Neck: Normal range of motion. Neck supple. Normal carotid pulses, no hepatojugular reflux and no JVD present. Carotid bruit is not present. No tracheal deviation present. No thyromegaly present.   Cardiovascular: Normal rate, regular rhythm, S1 normal and S2 normal.  No extrasystoles are present. PMI is not displaced. Exam reveals no gallop, no S3, no distant heart sounds, no friction rub and no midsystolic click.   No murmur heard.  Pulses:       Carotid pulses are 2+ on the right side, and 2+ on the left side.       Radial pulses are 1+ on the right side, and 2+ on the left side.        Femoral pulses are 2+ on the right side, and 2+ on the left side.       Popliteal pulses are 2+ on the right side, and 2+ on the left side.        Dorsalis pedis pulses are 1+ on the right side, and 1+ on the left side.        Posterior tibial pulses are 1+ on the right side, and 1+ on the left side.   Pulmonary/Chest: Effort normal and breath sounds normal. No accessory muscle usage or stridor. No apnea, no tachypnea and no bradypnea. He is not intubated. No respiratory distress. He has no decreased breath sounds. He has no wheezes. He has no rales. He exhibits no tenderness and no bony tenderness.   Abdominal: He exhibits no distension, no pulsatile liver, no abdominal bruit, no ascites, no pulsatile midline mass and no mass. There is no tenderness. There is no rebound and no guarding.   Musculoskeletal: Normal range of motion. He exhibits no edema or tenderness.   Lymphadenopathy:     He has no cervical adenopathy.   Neurological: He is alert and oriented to  person, place, and time. He has normal reflexes. No cranial nerve deficit. Coordination normal.   Skin: Skin is warm. No rash noted. No erythema. No pallor.   Psychiatric: He has a normal mood and affect. His behavior is normal. Judgment and thought content normal.       Significant Labs:       LABS  CBC  Recent Labs   Lab 10/04/19  1935   WBC 8.71   RBC 3.63*   HGB 9.9*   HCT 28.8*      MCV 79*   MCH 27.3   MCHC 34.4     BMP  Recent Labs   Lab 10/04/19  1935      K 2.6*   CO2 21*      BUN 25*   CREATININE 2.6*   *       POCT-Glucose  No results found for: POCTGLUCOSE    Recent Labs   Lab 10/04/19  1935   CALCIUM 8.3*   MG 1.7     LFT  Recent Labs   Lab 10/04/19  1935   PROT 5.3*   ALBUMIN 2.5*   BILITOT 0.4   AST 45*   ALKPHOS 80   ALT 16     GFR *    COAGS  Recent Labs   Lab 10/04/19  1935   INR 1.1   APTT 21.5     CE  Recent Labs   Lab 10/04/19  1935   TROPONINI 8.505*     ABGs  No results for input(s): PH, PCO2, PO2, HCO3, POCSATURATED, BE in the last 24 hours.  BNP  Recent Labs   Lab 10/04/19  1935   BNP 99       LAST HbA1c  Lab Results   Component Value Date    HGBA1C 5.8 04/30/2013       Lipid panel  Lab Results   Component Value Date    CHOL 186 03/29/2019    CHOL 183 10/07/2017    CHOL 166 01/07/2017     Lab Results   Component Value Date    HDL 74 03/29/2019    HDL 51 10/07/2017    HDL 50 01/07/2017     Lab Results   Component Value Date    LDLCALC 92.2 03/29/2019    LDLCALC 107.0 10/07/2017    LDLCALC 88.6 01/07/2017     Lab Results   Component Value Date    TRIG 99 03/29/2019    TRIG 125 10/07/2017    TRIG 137 01/07/2017     Lab Results   Component Value Date    CHOLHDL 39.8 03/29/2019    CHOLHDL 27.9 10/07/2017    CHOLHDL 30.1 01/07/2017          Significant Imaging:       Imaging Results          X-Ray Chest AP Portable (Final result)  Result time 10/04/19 20:30:23    Final result by Arash Bonilla MD (10/04/19 20:30:23)                 Impression:      Patchy interstitial  opacities.  The findings may represent early pulmonary edema or nonspecific pneumonitis.  No focal consolidation.      Electronically signed by: Arash Bonilla MD  Date:    10/04/2019  Time:    20:30             Narrative:    EXAMINATION:  XR CHEST AP PORTABLE    CLINICAL HISTORY:  Chest pain, unspecified    TECHNIQUE:  Single frontal view of the chest was performed.    COMPARISON:  09/10/2009.    FINDINGS:  Monitoring EKG leads are present.  There are epicardial pacer pads present.  There are postoperative changes in the right lung apex.  The trachea is unremarkable.  The cardiomediastinal silhouette is within normal limits.  There is obscuration of both lateral costophrenic angles.  There is no evidence of free air beneath the hemidiaphragms.  There is no evidence of a pneumothorax.  There is no evidence of pneumomediastinum.  There are patchy interstitial opacities with lower lobe predominance.  There are degenerative changes in the osseous structures.                                Assessment and Plan:     Active Diagnoses:    Diagnosis Date Noted POA    PRINCIPAL PROBLEM:  Cardiac arrest [I46.9] 10/04/2019 Yes    Atherosclerosis of native artery of both lower extremities with intermittent claudication [I70.213] 03/02/2018 Yes    Atrial fibrillation with RVR [I48.91] 10/04/2019 Yes    Syncope [R55] 10/04/2019 Unknown    Hypokalemia [E87.6] 10/04/2019 Unknown    Acute renal failure [N17.9] 10/04/2019 Unknown    Bilateral carotid artery stenosis [I65.23]  Yes    Coronary artery disease involving native coronary artery of native heart with angina pectoris with documented spasm [I25.111] 03/29/2019 Yes    Abnormal cardiovascular stress test [R94.39] 02/27/2019 Yes    Venous insufficiency of both lower extremities [I87.2] 06/04/2018 Yes    History of back injury [Z87.828] 11/21/2014 Not Applicable    Claudication in peripheral vascular disease [I73.9] 06/13/2014 Yes    PAD (peripheral artery disease)  [I73.9] 05/21/2014 Yes    Atherosclerosis of leg with intermittent claudication [I70.219] 04/21/2014 Yes    HTN (hypertension) [I10] 04/29/2013 Yes      Problems Resolved During this Admission:       VTE Risk Mitigation (From admission, onward)         Ordered     heparin 25,000 units in dextrose 5% (100 units/ml) IV bolus from bag INITIAL BOLUS  Once      10/04/19 2051     heparin 25,000 units in dextrose 5% 250 mL (100 units/mL) infusion LOW INTENSITY nomogram - [facility]  Continuous      10/04/19 2051     heparin 25,000 units in dextrose 5% (100 units/ml) IV bolus from bag - ADDITIONAL PRN BOLUS - 60 units/kg  As needed (PRN)      10/04/19 2051     heparin 25,000 units in dextrose 5% (100 units/ml) IV bolus from bag - ADDITIONAL PRN BOLUS - 30 units/kg  As needed (PRN)      10/04/19 2051                    Imp:      59 year old male with CAD + PAD + HTN + HLP presenting with afib + rvr + abnormal electrolytes + elevated TNI complicated with cardiac arrest      No angina  Initially decided to take him to the cath lab for angiography but reviewing his labs we cancelled the case          Plan:      Replace K  IV hydration  Echo to assess EF  Serial TNI + CPK  IV heparin        JOSE +/- DCCV  No amiodarone drip because of unknown onset of afib  If HR increases > 120 we will start cardizem drip for rate control         Close monitoring in ICU        Spoke with patient and wife at length  They both expressed verbal understanding           LHC later after recovery of renal function or emergently if there is any clinical deterioration          Over 45 minutes of critical care time was spent with patient, wife, ED team, cath lab team, and ICU team               Koe Jenkins MD  Cardiology   [facility]

## 2019-10-05 NOTE — NURSING
PTT ordered for  0400. RN called lab to inform about pt needing blood draw. Lab at bedside to draw blood at 0511. Waiting for results.    0600: PTT resulted 39.9. No changed to gtt acording to nomogram

## 2019-10-05 NOTE — PLAN OF CARE
Dr nascimento notified that pt's k 3.3 and having many pvc's, trigeminy , runs 3-4 row, also that pt ate breakfast, ordered k, pt 's cre still elevated, will not be going to cath lab

## 2019-10-05 NOTE — PLAN OF CARE
Up to bsc, bm, but did not urinate, no co of need to void, not uncomfortable, has not voided since early am on pm shift, will monitor

## 2019-10-05 NOTE — PLAN OF CARE
No co of chest pain or sob, room air, sat's 95%, fine crackles rt post base, diminished bases, post, heart rate 72 with pvc's trigeminy, 3-4 row, no chest pain, k 3.3 will call Dr Ramirez

## 2019-10-05 NOTE — PROGRESS NOTES
"      Seen this am   Doing well  No CP      He is in NSR with PACs        Vitals:    10/05/19 0415 10/05/19 0527 10/05/19 0810 10/05/19 0944   BP: (!) 143/68   (!) 147/74   Pulse: 68  73 69   Resp: 12  20    Temp:       TempSrc:       SpO2: 97%  98%    Weight:       Height:  5' 10" (1.778 m)             LABS  CBC  Recent Labs   Lab 10/04/19  2124 10/05/19  0001 10/05/19  0449   WBC 8.93 8.79 7.10   RBC 3.55* 3.36* 3.41*   HGB 9.5* 9.1* 9.1*   HCT 28.1* 26.9* 27.2*    261 232   MCV 79* 80* 80*   MCH 26.8* 27.1 26.7*   MCHC 33.8 33.8 33.5     BMP  Recent Labs   Lab 10/04/19  1935 10/05/19  0449    142   K 2.6* 3.3*   CO2 21* 21*    110   BUN 25* 26*   CREATININE 2.6* 2.2*   * 110       POCT-Glucose  No results found for: POCTGLUCOSE    Recent Labs   Lab 10/04/19  1935 10/05/19  0449   CALCIUM 8.3* 8.0*   MG 1.7  --      LFT  Recent Labs   Lab 10/04/19  1935 10/05/19  0449   PROT 5.3* 5.2*   ALBUMIN 2.5* 2.2*   BILITOT 0.4 0.4   AST 45* 60*   ALKPHOS 80 70   ALT 16 17     GFR     COAGS  Recent Labs   Lab 10/04/19  1935 10/04/19  2124 10/05/19  0449 10/05/19  0932   INR 1.1 1.1  --   --    APTT 21.5 24.1 39.9*  39.9* 38.2*     CE  Recent Labs   Lab 10/04/19  1935 10/05/19  0449   TROPONINI 8.505* 27.274*     ABGs  No results for input(s): PH, PCO2, PO2, HCO3, POCSATURATED, BE in the last 24 hours.  BNP  Recent Labs   Lab 10/04/19  1935   BNP 99       LAST HbA1c  Lab Results   Component Value Date    HGBA1C 5.8 04/30/2013       Lipid panel  Lab Results   Component Value Date    CHOL 186 03/29/2019    CHOL 183 10/07/2017    CHOL 166 01/07/2017     Lab Results   Component Value Date    HDL 74 03/29/2019    HDL 51 10/07/2017    HDL 50 01/07/2017     Lab Results   Component Value Date    LDLCALC 92.2 03/29/2019    LDLCALC 107.0 10/07/2017    LDLCALC 88.6 01/07/2017     Lab Results   Component Value Date    TRIG 99 03/29/2019    TRIG 125 10/07/2017    TRIG 137 01/07/2017     Lab Results   Component " Value Date    CHOLHDL 39.8 03/29/2019    CHOLHDL 27.9 10/07/2017    CHOLHDL 30.1 01/07/2017            Patient Active Problem List    Diagnosis Date Noted    Cardiac arrest 10/04/2019     Priority: High    Atherosclerosis of native artery of both lower extremities with intermittent claudication 03/02/2018     Priority: High    Atherosclerosis of native artery of right lower extremity with intermittent claudication 04/13/2018     Priority: Low    Atrial fibrillation with RVR 10/04/2019    Syncope 10/04/2019    Hypokalemia 10/04/2019    Acute renal failure 10/04/2019    Bilateral carotid artery stenosis     Coronary artery disease involving native coronary artery of native heart with angina pectoris with documented spasm 03/29/2019         3/29/2019    S/p LHC via R radial           LM, LAD, and LCX are patent with luminal irregularities  RCA mid 95% calcified lesion  Normal EF with LVEDP 8 mmHg           PCI of mid LAD with 2.5 x 30 and 2.5 x 15 mm Resolute REZA  PCI of proximal RCA to treat guide dissection with 3.5 x 22 Resolute REZA             Abnormal cardiovascular stress test 02/27/2019         Nuclear stress test 2/2019     + ECG with 2 mm ST depression   No wall motion abnormalities   Test stopped at 6 minutes, 7 METs, 86% predicted heart rate   Stopped because of R leg claudication + ECG changes            Venous insufficiency of both lower extremities 06/04/2018    Localized edema 06/04/2018    Left knee pain 01/19/2016    Pain of left lower extremity 01/19/2016    Difficulty walking 01/19/2016    Acute pain of left knee 12/11/2015    Hyperlipidemia LDL goal <70 06/12/2015    DJD (degenerative joint disease), lumbar 05/27/2015    Lumbar facet arthropathy 05/27/2015    DDD (degenerative disc disease) 05/27/2015    Spondylosis without myelopathy 05/27/2015    Limb pain 11/21/2014    History of back injury 11/21/2014     20 years ago a bag fell on his back at work      Myalgia 11/21/2014     Claudication in peripheral vascular disease 06/13/2014    PAD (peripheral artery disease) 05/21/2014 6/13/2014      1. Three vessel runoff below the knee bilaterally.  2. Severe disease of the terminal aorta.  3. Successful PTAS.  4. Bilateral common iliac reconstruction with 8 x 39 mm stent extending in the aorta  5. Right common illiac was treated with an overlapping 9 x 60 mm SES   6. Left common iliac was treated with an overlapping 8 x 80 mm SES down to external iliac  7. All stents were post dilated with 9.0 x 60 mm balloons  8. Moderate bilateral SFA disease  9. Chronically occluded left internal iliac artery        6/12/2015      1. 80% mid left SFA with a 20 mmHg resting mean gradient  2. Atherectomy with 2.2 Phorestnix Volcano catheter  3. PTA with 6.0 x 100 mm Lutonix drug coated balloon        6/9/2017      Patent bilateral GRUPO/EIA stents  L EIA PTAS 9.0 x 80 mm Life stent post dilated with 8.0 mm balloon  Bilateral 70-80% SFA stenosis with 3 vessel run off          3/2018      L SFA intervention for claudication   75% L SFA with 3 vessel run off   7 mmHg resting and 33 mmHg hyperemic mean gradient         L CFA antegrade access   PTA with 6.0 x 150 mm   PTA with 6.0 x 150 mm Lutonix   3 vessel run off           4/13/2018       S/p R SFA PTA for winsome IIb claudication   R CFA antegrade access         R CFA with 50% stenosis-heavily calcified lesion               Baseline /90         R SFA with 70% stenosis with a significant resting and hyperemic mean gradient     3 vessel run off             PTA of R SFA with 6.0 x 150 + 6.0 x 60 mm Lutonix DCB        5/2/2018   Patent arteries post revascularization        2/2019     R 0.84 to 0.27   L 0.90 to 0.66   After ambulation   Severe R calf claudication        Peripheral angiogram 5/10/2019                   95% R CFA stenosis; heavily calcified    Patent SFA, POP + 3 vessel run off      Peripheral intervention 7/12/2019    S/p right CFA  revascularization for winsome IIb claudication       Assisted JOSY approach   L radial access for visualization   5-6 slender R PT access for delivery of therapy          Atherectomy with SilverHawk catheter   PTA with 7.0 x 40 mm Lutonix DCB            Atherosclerosis of leg with intermittent claudication 04/21/2014     Winsome IIB      Elevated TSH 05/29/2013    HTN (hypertension) 04/29/2013    Elevated fasting blood sugar 04/29/2013       Imp:                 59 year old male with CAD + PAD + HTN + HLP presenting with afib + rvr + abnormal electrolytes + elevated TNI complicated with cardiac arrest        No angina  Initially decided to take him to the cath lab for angiography but reviewing his labs we cancelled the case      Plan:          Continue with IV hydration  Replace K to a goal > 4.0  Continue with IV heparin  GI consult for work up of anemia  Review echo to assess EF  Intense statin therapy  He will need a LHC but not at this time in view of ARF        Spoke with patient and RN at the beside

## 2019-10-05 NOTE — NURSING
2044: Patient to ICU room 258 from cath lab via stretcher. Connected to in room cardicac monitor. NS infusing to gravity. Patient is awake, alert, and oriented x4. Moves all extremities and follows commands. Pt HR 70's-80's afib/NSR with frequent PVCs, bigeminy, and and trigeminy. See flowsheets for details. All lines are intact and fluids are infusing without difficulty. See MAR for gtts. Bed in lowest position. Siderails x3. Will continue to monitor    2130: Dr. Jenkins at bedside. RN administered 60 mEq PO. Asked MD if want to recheck K labs. No ordes given to recheck K. Reported pt had previous sustained afib RVR with a 's for 1 minute. Verbal orders for EKG, Toprol XR  50mg PO now and 60mEq K PO at 0300.    2225: Paged Dr. Jenkins. Spoke to Dr via telephone. Updated MD on EKG results and prolonged QT. Verified if MD wants RN to administer cilostazol when pt on heparin gtt. MD verified RN can administer cilostazol.

## 2019-10-05 NOTE — PLAN OF CARE
Safety: call light in reach, patient oriented to room & instructed how to notify nurse if assistance is needed, current questions/concerns addressed, bed in lowest position with wheels locked & side rails up X 3. Pt educated regarding fall prevention and taking appropriate action to prevent falls Activity: care clustered for maximum rest. Pt instructed to call RN for mobility. Neurological: Oriented x4 Respiratory: 100% RA Cardiac: BP normotensive; HR changes between NSR and afib with a rate 70-80. Afebrile this shift. Intake/Output: 275 urine output, no bm today, Diet: diet was NPO due to possible cath in AM.  Pain: no complaints of pain Skin: intact Devices: IV pump

## 2019-10-05 NOTE — PLAN OF CARE
Order for NS  1/2 ago, ordered for 16 hrs, Dr Ramirez on page to clarify, thought plan was to continue hydration

## 2019-10-05 NOTE — PLAN OF CARE
Lab called, ptt due for 1630 was drawn late, and now, lab states not enough blood, lab recalled to come do again

## 2019-10-05 NOTE — PLAN OF CARE
No co of chest pain or sob, no cath today, due to inc cre, continues on fluids, not urinating much, mor e in than out, not uncomfortable, still with pvc's another dose of kdur 60 meq given today, k 3.3, heparin drip still in progress

## 2019-10-05 NOTE — ED PROVIDER NOTES
Encounter Date: 10/4/2019       History     Chief Complaint   Patient presents with    post cardiac arrest     per EMS pt was found in cardiac arrest by the fire department. pt was down for approx. 4 minutes, pt was shocked once and CPR was performed. Upon arrival pt was given an 150 amio bolus. Upon arrival pt awake and alert.      Domingo Gross Sr. is a 59 y.o. male who  has a past medical history of Allergy, Arthritis, Coronary artery disease, Hemothorax, Hyperlipidemia, Hypertension, and PAD (peripheral artery disease).    The patient presents to the ED due to cardiac arrest.  EMS reports he had witnessed syncope and collapse while at a Infogram function just prior to arrival. Fire department was on scene and did not appreciate a pulse. CPR was initiated, and patient received 1 shock with subsequent ROSC. EMS reports EKG was concerning for a-fib with RVR. Patient received 150 mg amiodarone en route.  On arrival to ED, patient is awake and alert, endorsing shortness of breath. He does not remember falling or any other details about the episode, and denies any preceding chest pain or palpitations.  He denies any history of A-fib.  He endorses history of coronary artery disease with multiple stents in the past, and is followed by Dr. Jenkins. He is currently compliant with aspirin and Plavix.  He denies any recent illness or sick contacts. He denies any other complaints.        Review of patient's allergies indicates:   Allergen Reactions    Ace inhibitors Rash     Past Medical History:   Diagnosis Date    Allergy     Arthritis     Coronary artery disease     Hemothorax     Hyperlipidemia     Hypertension     PAD (peripheral artery disease)      Past Surgical History:   Procedure Laterality Date    ABDOMINAL AORTOGRAPHY N/A 5/10/2019    Procedure: AORTOGRAM-ABDOMINAL;  Surgeon: Keo Jenkins MD;  Location: [facility] CATH LAB/EP;  Service: Cardiology;  Laterality: N/A;  RSFA intervention     CARDIAC  CATHETERIZATION      CORONARY ANGIOGRAPHY N/A 3/29/2019    Procedure: ANGIOGRAM, CORONARY ARTERY;  Surgeon: Keo Jenkins MD;  Location: [facility] CATH LAB/EP;  Service: Cardiology;  Laterality: N/A;    CORONARY ANGIOPLASTY WITH STENT PLACEMENT  2019    mid and distal RCA    EYE SURGERY      LEFT HEART CATHETERIZATION N/A 3/29/2019    Procedure: Left heart cath;  Surgeon: Keo Jenkins MD;  Location: [facility] CATH LAB/EP;  Service: Cardiology;  Laterality: N/A;    PERCUTANEOUS TRANSLUMINAL ANGIOPLASTY (PTA) OF PERIPHERAL VESSEL N/A 2019    Procedure: PTA, PERIPHERAL VESSEL;  Surgeon: Keo Jenkins MD;  Location: [facility] CATH LAB/EP;  Service: Cardiology;  Laterality: N/A;     Family History   Problem Relation Age of Onset    Cancer Father      Social History     Tobacco Use    Smoking status: Former Smoker     Types: Cigarettes     Last attempt to quit: 1999     Years since quittin.4    Smokeless tobacco: Never Used   Substance Use Topics    Alcohol use: No     Alcohol/week: 0.0 standard drinks    Drug use: No     Review of Systems   Constitutional: Negative for chills and fever.   HENT: Negative for sore throat.    Respiratory: Positive for shortness of breath.    Cardiovascular: Negative for chest pain, palpitations and leg swelling.   Gastrointestinal: Negative for constipation, diarrhea, nausea and vomiting.   Genitourinary: Negative for dysuria, frequency and urgency.   Musculoskeletal: Negative for back pain.   Skin: Negative for rash and wound.   Neurological: Positive for syncope. Negative for weakness.   Hematological: Does not bruise/bleed easily.   Psychiatric/Behavioral: Negative for agitation, behavioral problems and confusion.       Physical Exam     Initial Vitals   BP Pulse Resp Temp SpO2   10/04/19 1920 10/04/19 1918 10/04/19 1918 10/04/19 1928 10/04/19 1918   128/69 (!) 147 20 98.6 °F (37 °C) 100 %      MAP       --                Physical Exam    Nursing note and vitals  reviewed.  Constitutional: He appears well-developed and well-nourished. He is not diaphoretic. No distress.   HENT:   Head: Normocephalic and atraumatic.   Mouth/Throat: Oropharynx is clear and moist.   Eyes: EOM are normal. Pupils are equal, round, and reactive to light.   Neck: No tracheal deviation present.   Cardiovascular: Normal heart sounds and intact distal pulses. An irregularly irregular rhythm present.  Tachycardia present.    A-fib, rate 140s.   Pulmonary/Chest: Breath sounds normal. No stridor. No respiratory distress.   Abdominal: Soft. He exhibits no distension and no mass. There is no tenderness.   Musculoskeletal: Normal range of motion. He exhibits no edema.   Neurological: He is alert and oriented to person, place, and time. No cranial nerve deficit or sensory deficit.   Skin: Skin is warm and dry. Capillary refill takes less than 2 seconds. No rash noted.   Psychiatric: He has a normal mood and affect. His behavior is normal. Thought content normal.         ED Course   Procedures  Labs Reviewed   COMPREHENSIVE METABOLIC PANEL - Abnormal; Notable for the following components:       Result Value    Potassium 2.6 (*)     CO2 21 (*)     Glucose 136 (*)     BUN, Bld 25 (*)     Creatinine 2.6 (*)     Calcium 8.3 (*)     Total Protein 5.3 (*)     Albumin 2.5 (*)     AST 45 (*)     eGFR if  30 (*)     eGFR if non  26 (*)     All other components within normal limits    Narrative:     K critical result(s) called and verbal readback obtained from   Crystal Han RN, 10/04/2019 20:21   CBC W/ AUTO DIFFERENTIAL - Abnormal; Notable for the following components:    RBC 3.63 (*)     Hemoglobin 9.9 (*)     Hematocrit 28.8 (*)     Mean Corpuscular Volume 79 (*)     Gran% 76.7 (*)     Lymph% 14.1 (*)     All other components within normal limits   TROPONIN I - Abnormal; Notable for the following components:    Troponin I 8.505 (*)     All other components within normal limits    PROTIME-INR   APTT   MAGNESIUM     EKG Readings: (Independently Interpreted)   Initial Reading: Ischemia. Previous EKG: Compared with most recent EKG Rhythm: Atrial Fibrillation.   A-fib RVR, rate 124, diffuse ST depressions in inferior and lateral leads, ST elevation in aVR, concerning for acute ischemia.   Significantly changed from prior EKG 07/2019     ECG Results          EKG 12-lead (In process)  Result time 10/05/19 11:07:32    In process by Interface, Lab In [facility] (10/05/19 11:07:32)                 Narrative:    Test Reason : R07.9,    Vent. Rate : 080 BPM     Atrial Rate : 080 BPM     P-R Int : 154 ms          QRS Dur : 088 ms      QT Int : 440 ms       P-R-T Axes : 075 008 069 degrees     QTc Int : 507 ms    Sinus rhythm with occasional Premature ventricular complexes and Fusion  complexes  Cannot rule out Anterior infarct (cited on or before 12-JUL-2019)  Prolonged QT  Abnormal ECG  When compared with ECG of 12-JUL-2019 06:59,  Fusion complexes are now Present  Premature ventricular complexes are now Present  Questionable change in initial forces of Septal leads  Nonspecific T wave abnormality no longer evident in Inferior leads  QT has lengthened    Referred By: [email or web address]   SELF           Confirmed By:                             Imaging Results          X-Ray Chest AP Portable (Final result)  Result time 10/04/19 20:30:23    Final result by Arash Bonilla MD (10/04/19 20:30:23)                 Impression:      Patchy interstitial opacities.  The findings may represent early pulmonary edema or nonspecific pneumonitis.  No focal consolidation.      Electronically signed by: Arash Bonilla MD  Date:    10/04/2019  Time:    20:30             Narrative:    EXAMINATION:  XR CHEST AP PORTABLE    CLINICAL HISTORY:  Chest pain, unspecified    TECHNIQUE:  Single frontal view of the chest was performed.    COMPARISON:  09/10/2009.    FINDINGS:  Monitoring EKG leads are present.  There are epicardial pacer pads  present.  There are postoperative changes in the right lung apex.  The trachea is unremarkable.  The cardiomediastinal silhouette is within normal limits.  There is obscuration of both lateral costophrenic angles.  There is no evidence of free air beneath the hemidiaphragms.  There is no evidence of a pneumothorax.  There is no evidence of pneumomediastinum.  There are patchy interstitial opacities with lower lobe predominance.  There are degenerative changes in the osseous structures.                                 Medical Decision Making:   Differential Diagnosis:   Differential Diagnosis includes, but is not limited to:  Arrhythmia, aortic dissection, MI/unstable angina, PE, cardiogenic shock, CHF, CVA/TIA, intracranial lesion/mass, seizure, perforated viscous, ruptured AAA, orthostatic hypotension, vasovagal episode, anemia, dehydration, medication reaction, intentional overdose    Clinical Tests:   Lab Tests: Ordered and Reviewed  Radiological Study: Reviewed and Ordered  Medical Tests: Reviewed and Ordered    Upon re-evaluation, the patient's status has remained critical.  At this time, I believe the patient should be admitted to the hospital for further evaluation and management of cardiac arrest.  Cardiology service was contacted and the case was discussed.   The consulting physician/team agrees with plan and will admit under their service.   The patient and family were updated with test results, overall impression, and further plan of care. All questions were answered. The patient expressed understanding and agrees with the current plan.                Attending Attestation:         Attending Critical Care:   Critical Care Times:   Direct Patient Care (initial evaluation, reassessments, and time considering the case)................................................................15 minutes.   Additional History from reviewing old medical records or taking additional history from the family, EMS, PCP,  etc.......................5 minutes.   Ordering, Reviewing, and Interpreting Diagnostic Studies...............................................................................................................10 minutes.   Documentation..................................................................................................................................................................................12 minutes.   Consultation with other Physicians. .................................................................................................................................................8 minutes.   Consultation with the patient's family directly relating to the patient's condition, care, and DNR status (when patient unable)......5 minutes.   ==============================================================  · Total Critical Care Time - exclusive of procedural time: 55 minutes.  ==============================================================  Critical care was necessary to treat or prevent imminent or life-threatening deterioration of the following conditions: cardiac arrhythmia.   Critical care was time spent personally by me on the following activities: obtaining history from patient or relative, examination of patient, review of old charts, ordering lab, x-rays, and/or EKG, ordering and performing treatments and interventions, discussion with consultants and interpretation of cardiac measurements.   Critical Care Condition: critical               ED Course as of Oct 05 1701   Fri Oct 04, 2019   1926 59-year-old male with history of extensive coronary artery disease with multiple stents in the past presents to ED after witnessed cardiac arrest.  He was at a Boy  function when he suddenly collapsed.  Fire department was on scene and did not appreciate a pulse.  CPR was initiated, and patient received 1 shock with subsequent ROSC.  EMS reports their EKG was concerning for AFib with RVR.  On arrival,  patient is awake and alert, endorsing shortness of breath. He does not remember the episode, and denies any preceding chest pain or palpitations.  He denies any history of A-fib.  On arrival, vitals with tachycardia to 140s, irregularly irregular rhythm.  Will repeat EKG, discuss with cardiology, obtain labs, and continue to monitor.    [SS]   1950 Discussed with Dr. Dominguez, Cardiology, who feels no STEMI or reason for emergent heart catheterization at this time.    [SS]   2000 Discussed with Dr. Jenkins, who will take patient to the cath lab for further evaluation and intervention.    [SS]      ED Course User Index  [SS] Aneesh Mendieta MD     Clinical Impression:       ICD-10-CM ICD-9-CM   1. Cardiac arrest I46.9 427.5   2. Atrial fibrillation with RVR I48.91 427.31   3. Syncope, unspecified syncope type R55 780.2   4. Chest pain R07.9 786.50   5. ST elevation myocardial infarction (STEMI), unspecified artery I21.3 410.90   6. Cardiac arrest I46.9 427.5          Disposition:   Disposition: Admitted  Condition: Serious                        Aneesh Mendieta MD  10/05/19 0393

## 2019-10-06 LAB
ALBUMIN SERPL BCP-MCNC: 2.1 G/DL (ref 3.5–5.2)
ALP SERPL-CCNC: 69 U/L (ref 55–135)
ALT SERPL W/O P-5'-P-CCNC: 18 U/L (ref 10–44)
ANION GAP SERPL CALC-SCNC: 9 MMOL/L (ref 8–16)
APTT BLDCRRT: 46.2 SEC (ref 21–32)
APTT BLDCRRT: 46.2 SEC (ref 21–32)
APTT BLDCRRT: 48.6 SEC (ref 21–32)
APTT BLDCRRT: 57.7 SEC (ref 21–32)
AST SERPL-CCNC: 53 U/L (ref 10–40)
BASOPHILS # BLD AUTO: 0.02 K/UL (ref 0–0.2)
BASOPHILS NFR BLD: 0.3 % (ref 0–1.9)
BILIRUB SERPL-MCNC: 0.3 MG/DL (ref 0.1–1)
BUN SERPL-MCNC: 24 MG/DL (ref 6–20)
CALCIUM SERPL-MCNC: 7.9 MG/DL (ref 8.7–10.5)
CHLORIDE SERPL-SCNC: 113 MMOL/L (ref 95–110)
CO2 SERPL-SCNC: 21 MMOL/L (ref 23–29)
CREAT SERPL-MCNC: 1.6 MG/DL (ref 0.5–1.4)
DIFFERENTIAL METHOD: ABNORMAL
EOSINOPHIL # BLD AUTO: 0.2 K/UL (ref 0–0.5)
EOSINOPHIL NFR BLD: 3.5 % (ref 0–8)
ERYTHROCYTE [DISTWIDTH] IN BLOOD BY AUTOMATED COUNT: 13.1 % (ref 11.5–14.5)
EST. GFR  (AFRICAN AMERICAN): 54 ML/MIN/1.73 M^2
EST. GFR  (NON AFRICAN AMERICAN): 47 ML/MIN/1.73 M^2
GLUCOSE SERPL-MCNC: 93 MG/DL (ref 70–110)
HCT VFR BLD AUTO: 29.2 % (ref 40–54)
HGB BLD-MCNC: 9.6 G/DL (ref 14–18)
LYMPHOCYTES # BLD AUTO: 2.2 K/UL (ref 1–4.8)
LYMPHOCYTES NFR BLD: 31.4 % (ref 18–48)
MCH RBC QN AUTO: 26.8 PG (ref 27–31)
MCHC RBC AUTO-ENTMCNC: 32.9 G/DL (ref 32–36)
MCV RBC AUTO: 82 FL (ref 82–98)
MONOCYTES # BLD AUTO: 0.6 K/UL (ref 0.3–1)
MONOCYTES NFR BLD: 8 % (ref 4–15)
NEUTROPHILS # BLD AUTO: 3.9 K/UL (ref 1.8–7.7)
NEUTROPHILS NFR BLD: 56.8 % (ref 38–73)
PLATELET # BLD AUTO: 246 K/UL (ref 150–350)
PMV BLD AUTO: 9.5 FL (ref 9.2–12.9)
POTASSIUM SERPL-SCNC: 3.4 MMOL/L (ref 3.5–5.1)
PROT SERPL-MCNC: 5.2 G/DL (ref 6–8.4)
RBC # BLD AUTO: 3.58 M/UL (ref 4.6–6.2)
SODIUM SERPL-SCNC: 143 MMOL/L (ref 136–145)
TROPONIN I SERPL DL<=0.01 NG/ML-MCNC: 15.26 NG/ML (ref 0–0.03)
WBC # BLD AUTO: 6.88 K/UL (ref 3.9–12.7)

## 2019-10-06 PROCEDURE — 85025 COMPLETE CBC W/AUTO DIFF WBC: CPT

## 2019-10-06 PROCEDURE — 25000003 PHARM REV CODE 250: Performed by: INTERNAL MEDICINE

## 2019-10-06 PROCEDURE — 94761 N-INVAS EAR/PLS OXIMETRY MLT: CPT

## 2019-10-06 PROCEDURE — 63600175 PHARM REV CODE 636 W HCPCS: Performed by: INTERNAL MEDICINE

## 2019-10-06 PROCEDURE — 11000001 HC ACUTE MED/SURG PRIVATE ROOM

## 2019-10-06 PROCEDURE — 99291 PR CRITICAL CARE, E/M 30-74 MINUTES: ICD-10-PCS | Mod: ,,, | Performed by: INTERNAL MEDICINE

## 2019-10-06 PROCEDURE — 84484 ASSAY OF TROPONIN QUANT: CPT

## 2019-10-06 PROCEDURE — 85730 THROMBOPLASTIN TIME PARTIAL: CPT | Mod: 91

## 2019-10-06 PROCEDURE — 36415 COLL VENOUS BLD VENIPUNCTURE: CPT

## 2019-10-06 PROCEDURE — 99291 CRITICAL CARE FIRST HOUR: CPT | Mod: ,,, | Performed by: INTERNAL MEDICINE

## 2019-10-06 PROCEDURE — 80053 COMPREHEN METABOLIC PANEL: CPT

## 2019-10-06 RX ORDER — METOPROLOL SUCCINATE 50 MG/1
50 TABLET, EXTENDED RELEASE ORAL DAILY
Status: DISCONTINUED | OUTPATIENT
Start: 2019-10-06 | End: 2019-10-07

## 2019-10-06 RX ORDER — METOPROLOL SUCCINATE 50 MG/1
100 TABLET, EXTENDED RELEASE ORAL DAILY
Status: DISCONTINUED | OUTPATIENT
Start: 2019-10-07 | End: 2019-10-12 | Stop reason: HOSPADM

## 2019-10-06 RX ORDER — POTASSIUM CHLORIDE 20 MEQ/1
60 TABLET, EXTENDED RELEASE ORAL ONCE
Status: COMPLETED | OUTPATIENT
Start: 2019-10-06 | End: 2019-10-06

## 2019-10-06 RX ADMIN — HYDRALAZINE HYDROCHLORIDE 10 MG: 20 INJECTION INTRAMUSCULAR; INTRAVENOUS at 02:10

## 2019-10-06 RX ADMIN — ATORVASTATIN CALCIUM 40 MG: 40 TABLET, FILM COATED ORAL at 10:10

## 2019-10-06 RX ADMIN — SODIUM CHLORIDE: 0.9 INJECTION, SOLUTION INTRAVENOUS at 10:10

## 2019-10-06 RX ADMIN — HYDRALAZINE HYDROCHLORIDE 10 MG: 20 INJECTION INTRAMUSCULAR; INTRAVENOUS at 04:10

## 2019-10-06 RX ADMIN — HEPARIN SODIUM AND DEXTROSE 16 UNITS/KG/HR: 10000; 5 INJECTION INTRAVENOUS at 09:10

## 2019-10-06 RX ADMIN — Medication 81 MG: at 09:10

## 2019-10-06 RX ADMIN — POTASSIUM CHLORIDE 60 MEQ: 20 TABLET, EXTENDED RELEASE ORAL at 11:10

## 2019-10-06 RX ADMIN — METOPROLOL SUCCINATE 50 MG: 50 TABLET, EXTENDED RELEASE ORAL at 09:10

## 2019-10-06 RX ADMIN — CLOPIDOGREL BISULFATE 75 MG: 75 TABLET ORAL at 09:10

## 2019-10-06 RX ADMIN — METOPROLOL SUCCINATE 50 MG: 50 TABLET, EXTENDED RELEASE ORAL at 01:10

## 2019-10-06 RX ADMIN — CILOSTAZOL 100 MG: 50 TABLET ORAL at 09:10

## 2019-10-06 RX ADMIN — CILOSTAZOL 100 MG: 50 TABLET ORAL at 10:10

## 2019-10-06 RX ADMIN — SODIUM CHLORIDE: 0.9 INJECTION, SOLUTION INTRAVENOUS at 11:10

## 2019-10-06 NOTE — PLAN OF CARE
Pt now awake and alert , no co of sob or chest pain, but co of soreness across chest, states comes and goes, was there since admit to hospital, probably from compressions and shock, offered tylenol but states not that bad, continues on heparin 16units hr, and on fluid 125cc ns hr, does have crackles post bases, 12 up rt, in greater than out, cre down today, 1.6 was 2.2, will inform Dr De Oliveira on rounds

## 2019-10-06 NOTE — PLAN OF CARE
Dr Ramirez notified of the continued crackles post bases and upper airway wheezes, that cleared past coughing, positive 3277 cc since admit, states he wants to continue the fluids as ordered, since cre still up, monitor and let him know if any change

## 2019-10-06 NOTE — PROGRESS NOTES
Seen this am   No acute issues overnight        Vitals:    10/06/19 1100 10/06/19 1115 10/06/19 1130 10/06/19 1145   BP: (!) 165/88  (!) 148/77    Pulse: 88 87 93 82   Resp: (!) 29 (!) 30 (!) 34 (!) 22   Temp:    98 °F (36.7 °C)   TempSrc:    Oral   SpO2: 100% 100% 100% 100%   Weight:       Height:           LABS  CBC  Recent Labs   Lab 10/05/19  0001 10/05/19  0449 10/06/19  0415   WBC 8.79 7.10 6.88   RBC 3.36* 3.41* 3.58*   HGB 9.1* 9.1* 9.6*   HCT 26.9* 27.2* 29.2*    232 246   MCV 80* 80* 82   MCH 27.1 26.7* 26.8*   MCHC 33.8 33.5 32.9     BMP  Recent Labs   Lab 10/04/19  1935 10/05/19  0449 10/06/19  0415    142 143   K 2.6* 3.3* 3.4*   CO2 21* 21* 21*    110 113*   BUN 25* 26* 24*   CREATININE 2.6* 2.2* 1.6*   * 110 93       POCT-Glucose  No results found for: POCTGLUCOSE    Recent Labs   Lab 10/04/19  1935 10/05/19  0449 10/06/19  0415   CALCIUM 8.3* 8.0* 7.9*   MG 1.7  --   --      LFT  Recent Labs   Lab 10/04/19  1935 10/05/19  0449 10/06/19  0415   PROT 5.3* 5.2* 5.2*   ALBUMIN 2.5* 2.2* 2.1*   BILITOT 0.4 0.4 0.3   AST 45* 60* 53*   ALKPHOS 80 70 69   ALT 16 17 18     GFR     COAGS  Recent Labs   Lab 10/04/19  1935 10/04/19  2124  10/05/19  1946 10/06/19  0415 10/06/19  1008   INR 1.1 1.1  --   --   --   --    APTT 21.5 24.1   < > 38.3* 57.7* 48.6*    < > = values in this interval not displayed.     CE  Recent Labs   Lab 10/04/19  1935 10/05/19  0449 10/06/19  0415   TROPONINI 8.505* 27.274* 15.264*     ABGs  No results for input(s): PH, PCO2, PO2, HCO3, POCSATURATED, BE in the last 24 hours.  BNP  Recent Labs   Lab 10/04/19  1935   BNP 99       LAST HbA1c  Lab Results   Component Value Date    HGBA1C 5.8 04/30/2013       Lipid panel  Lab Results   Component Value Date    CHOL 186 03/29/2019    CHOL 183 10/07/2017    CHOL 166 01/07/2017     Lab Results   Component Value Date    HDL 74 03/29/2019    HDL 51 10/07/2017    HDL 50 01/07/2017     Lab Results   Component Value Date     LDLCALC 92.2 03/29/2019    LDLCALC 107.0 10/07/2017    LDLCALC 88.6 01/07/2017     Lab Results   Component Value Date    TRIG 99 03/29/2019    TRIG 125 10/07/2017    TRIG 137 01/07/2017     Lab Results   Component Value Date    CHOLHDL 39.8 03/29/2019    CHOLHDL 27.9 10/07/2017    CHOLHDL 30.1 01/07/2017          Imp:               59 year old male with CAD + PAD + HTN + HLP presenting with afib + rvr + abnormal electrolytes + elevated TNI complicated with cardiac arrest        No angina  Initially decided to take him to the cath lab for angiography but reviewing his labs we cancelled the case        Plan:      IV fluids for ARF  Replace K   IV heparin   Intense statin therapy   Delay ischemic work up until renal function is normalized          Transfer to the floor today

## 2019-10-06 NOTE — PLAN OF CARE
Safety: call light in reach, patient oriented to room & instructed how to notify nurse if assistance is needed, current questions/concerns addressed, bed in lowest position with wheels locked & side rails up X 3. Pt educated regarding fall prevention and taking appropriate action to prevent falls   Activity: activity clustered for optimal rest; ambulates with assistance  Neurological: Oriented x4   Respiratory: spo2 >95% on RA  Cardiac: BP normotensive; HR stable. Afebrile this shift.   Intake/Output: >500mL urine output, no bm tonight,   Diet: cardiac diet but no food intake on PM shift    Pain: pt denies pain  Skin: CDI  Devices: IV pump

## 2019-10-06 NOTE — PLAN OF CARE
Dr De Oliveira here, informed status, continued pvc's couplets, and short run of v tach, aslo his co of soreness in chest, told of crackles post bases , in greater than out,

## 2019-10-06 NOTE — PLAN OF CARE
Transferred to [facility] via bed  With transport and nurse Cynthia VALENZUELA, pt noted to be winded walking to bed, had given dose of hydralazine 10mgm for bp 176 sys

## 2019-10-06 NOTE — PLAN OF CARE
Noted upper airway wheezes, while I was doing site care to iv, inc in resp also,denies sob, still with fine crackles post bases, less diminished, wheezes subsided past couging, bp 165 , just given additional dose of metoprolol xl 50 mgm as ordered

## 2019-10-07 LAB
ALBUMIN SERPL BCP-MCNC: 1.9 G/DL (ref 3.5–5.2)
ALP SERPL-CCNC: 66 U/L (ref 55–135)
ALT SERPL W/O P-5'-P-CCNC: 15 U/L (ref 10–44)
ANION GAP SERPL CALC-SCNC: 9 MMOL/L (ref 8–16)
AORTIC ROOT ANNULUS: 3.54 CM
AORTIC VALVE CUSP SEPERATION: 2.19 CM
APTT BLDCRRT: 45.2 SEC (ref 21–32)
AST SERPL-CCNC: 36 U/L (ref 10–40)
AV INDEX (PROSTH): 0.78
AV MEAN GRADIENT: 3 MMHG
AV PEAK GRADIENT: 5 MMHG
AV VALVE AREA: 2.79 CM2
AV VELOCITY RATIO: 0.88
BILIRUB SERPL-MCNC: 0.4 MG/DL (ref 0.1–1)
BSA FOR ECHO PROCEDURE: 2.09 M2
BUN SERPL-MCNC: 16 MG/DL (ref 6–20)
CALCIUM SERPL-MCNC: 7.6 MG/DL (ref 8.7–10.5)
CHLORIDE SERPL-SCNC: 113 MMOL/L (ref 95–110)
CO2 SERPL-SCNC: 20 MMOL/L (ref 23–29)
CREAT SERPL-MCNC: 1.2 MG/DL (ref 0.5–1.4)
CV ECHO LV RWT: 0.39 CM
DOP CALC AO PEAK VEL: 1.12 M/S
DOP CALC AO VTI: 24.41 CM
DOP CALC LVOT AREA: 3.6 CM2
DOP CALC LVOT DIAMETER: 2.14 CM
DOP CALC LVOT PEAK VEL: 0.98 M/S
DOP CALC LVOT STROKE VOLUME: 68.2 CM3
DOP CALCLVOT PEAK VEL VTI: 18.97 CM
E WAVE DECELERATION TIME: 220.02 MSEC
E/A RATIO: 2.2
ECHO LV POSTERIOR WALL: 0.9 CM (ref 0.6–1.1)
EST. GFR  (AFRICAN AMERICAN): >60 ML/MIN/1.73 M^2
EST. GFR  (NON AFRICAN AMERICAN): >60 ML/MIN/1.73 M^2
FRACTIONAL SHORTENING: 61 % (ref 28–44)
GLUCOSE SERPL-MCNC: 90 MG/DL (ref 70–110)
INTERVENTRICULAR SEPTUM: 0.9 CM (ref 0.6–1.1)
IVRT: 0.06 MSEC
LA MAJOR: 5.3 CM
LA MINOR: 4.8 CM
LA WIDTH: 4.1 CM
LEFT ATRIUM SIZE: 2.43 CM
LEFT ATRIUM VOLUME INDEX: 20.7 ML/M2
LEFT ATRIUM VOLUME: 42.66 CM3
LEFT INTERNAL DIMENSION IN SYSTOLE: 1.8 CM (ref 2.1–4)
LEFT VENTRICLE DIASTOLIC VOLUME INDEX: 46.37 ML/M2
LEFT VENTRICLE DIASTOLIC VOLUME: 95.75 ML
LEFT VENTRICLE MASS INDEX: 67 G/M2
LEFT VENTRICLE SYSTOLIC VOLUME INDEX: 26.6 ML/M2
LEFT VENTRICLE SYSTOLIC VOLUME: 54.88 ML
LEFT VENTRICULAR INTERNAL DIMENSION IN DIASTOLE: 4.6 CM (ref 3.5–6)
LEFT VENTRICULAR MASS: 137.72 G
MV PEAK A VEL: 0.49 M/S
MV PEAK E VEL: 1.08 M/S
PISA TR MAX VEL: 2.55 M/S
POTASSIUM SERPL-SCNC: 3.1 MMOL/L (ref 3.5–5.1)
PROT SERPL-MCNC: 4.9 G/DL (ref 6–8.4)
PULM VEIN S/D RATIO: 1.28
PV PEAK D VEL: 0.43 M/S
PV PEAK S VEL: 0.55 M/S
PV PEAK VELOCITY: 1.1 CM/S
RA MAJOR: 3.9 CM
RA PRESSURE: 3 MMHG
RA WIDTH: 3.2 CM
RIGHT VENTRICULAR END-DIASTOLIC DIMENSION: 2.9 CM
SODIUM SERPL-SCNC: 142 MMOL/L (ref 136–145)
TR MAX PG: 26 MMHG
TV REST PULMONARY ARTERY PRESSURE: 29 MMHG

## 2019-10-07 PROCEDURE — 11000001 HC ACUTE MED/SURG PRIVATE ROOM

## 2019-10-07 PROCEDURE — 99233 PR SUBSEQUENT HOSPITAL CARE,LEVL III: ICD-10-PCS | Mod: ,,, | Performed by: INTERNAL MEDICINE

## 2019-10-07 PROCEDURE — 94761 N-INVAS EAR/PLS OXIMETRY MLT: CPT

## 2019-10-07 PROCEDURE — 80053 COMPREHEN METABOLIC PANEL: CPT

## 2019-10-07 PROCEDURE — 99233 SBSQ HOSP IP/OBS HIGH 50: CPT | Mod: ,,, | Performed by: INTERNAL MEDICINE

## 2019-10-07 PROCEDURE — 63600175 PHARM REV CODE 636 W HCPCS: Performed by: INTERNAL MEDICINE

## 2019-10-07 PROCEDURE — 85730 THROMBOPLASTIN TIME PARTIAL: CPT

## 2019-10-07 PROCEDURE — 25000003 PHARM REV CODE 250: Performed by: INTERNAL MEDICINE

## 2019-10-07 PROCEDURE — 25000003 PHARM REV CODE 250: Performed by: NURSE PRACTITIONER

## 2019-10-07 RX ORDER — POTASSIUM CHLORIDE 20 MEQ/1
60 TABLET, EXTENDED RELEASE ORAL ONCE
Status: COMPLETED | OUTPATIENT
Start: 2019-10-07 | End: 2019-10-07

## 2019-10-07 RX ORDER — DIPHENHYDRAMINE HCL 50 MG
50 CAPSULE ORAL ONCE
Status: DISCONTINUED | OUTPATIENT
Start: 2019-10-08 | End: 2019-10-08 | Stop reason: SDUPTHER

## 2019-10-07 RX ORDER — HYDRALAZINE HYDROCHLORIDE 25 MG/1
25 TABLET, FILM COATED ORAL EVERY 8 HOURS
Status: DISCONTINUED | OUTPATIENT
Start: 2019-10-07 | End: 2019-10-10

## 2019-10-07 RX ORDER — ISOSORBIDE DINITRATE 10 MG/1
20 TABLET ORAL 3 TIMES DAILY
Status: DISCONTINUED | OUTPATIENT
Start: 2019-10-07 | End: 2019-10-12 | Stop reason: HOSPADM

## 2019-10-07 RX ADMIN — HYDRALAZINE HYDROCHLORIDE 25 MG: 25 TABLET, FILM COATED ORAL at 09:10

## 2019-10-07 RX ADMIN — HYDRALAZINE HYDROCHLORIDE 25 MG: 25 TABLET, FILM COATED ORAL at 02:10

## 2019-10-07 RX ADMIN — Medication 81 MG: at 08:10

## 2019-10-07 RX ADMIN — ATORVASTATIN CALCIUM 40 MG: 40 TABLET, FILM COATED ORAL at 09:10

## 2019-10-07 RX ADMIN — POTASSIUM CHLORIDE 60 MEQ: 20 TABLET, EXTENDED RELEASE ORAL at 08:10

## 2019-10-07 RX ADMIN — METOPROLOL SUCCINATE 100 MG: 50 TABLET, EXTENDED RELEASE ORAL at 08:10

## 2019-10-07 RX ADMIN — HEPARIN SODIUM AND DEXTROSE 16 UNITS/KG/HR: 10000; 5 INJECTION INTRAVENOUS at 06:10

## 2019-10-07 RX ADMIN — CILOSTAZOL 100 MG: 50 TABLET ORAL at 09:10

## 2019-10-07 RX ADMIN — CLOPIDOGREL BISULFATE 75 MG: 75 TABLET ORAL at 08:10

## 2019-10-07 RX ADMIN — ISOSORBIDE DINITRATE 20 MG: 10 TABLET ORAL at 02:10

## 2019-10-07 RX ADMIN — ISOSORBIDE DINITRATE 20 MG: 10 TABLET ORAL at 09:10

## 2019-10-07 RX ADMIN — CILOSTAZOL 100 MG: 50 TABLET ORAL at 08:10

## 2019-10-07 RX ADMIN — HYDRALAZINE HYDROCHLORIDE 25 MG: 25 TABLET, FILM COATED ORAL at 08:10

## 2019-10-07 RX ADMIN — SODIUM CHLORIDE: 0.9 INJECTION, SOLUTION INTRAVENOUS at 03:10

## 2019-10-07 RX ADMIN — ISOSORBIDE DINITRATE 20 MG: 10 TABLET ORAL at 08:10

## 2019-10-07 NOTE — PLAN OF CARE
Reported slight pain to upper chest where compressions & cardio version were performed. Rated it as a 4/10.  pain was not new. It did not radiate to L arm, jaw or back.  Offered pain med but pt refused.   Nurse instructed pt to call if pain/tightness increased.  Pt agreed.   Becomes SOB w/ ambulation to bathroom approx 16: round trip & even turing in bed.     Tele: SR, irregular HR 80,  No alarms.     Bed in lowest position, wheels locked, non skid socks, ID band worn, personal items and call bell with in reach, bed alarm set.

## 2019-10-07 NOTE — PLAN OF CARE
Plan of care reviewed w/pt.  Pt verbalized understanding.  Heparin drip ongoing.  Pt to have left heart cath tomorrow.  NPO after midnight.  Fall/safety precautions maintained.   Bed in low position and locked.  Call light within reach.  Slip resistant socks on.  Bed alarm on.  Nurse instructed pt to notify staff for assistance.  Pt verbalized understanding.  Pt stable.  PIV clean, dry and intact.  No signs of distress noted.  Pt on tele.  No ectopy or red alarms throughout shift.

## 2019-10-07 NOTE — ASSESSMENT & PLAN NOTE
03/2019  · S/p LHC via right radial  · LM, LAD, and LCX are patent with luminal irregularities  · RCA mid 95% calcified lesion  · Normal EF with LVEDP 8 mmHg  · PCI of mid LAD with 2.5 x 30 and 2.5 x 15 mm Resolute REZA  · PCI of proximal RCA to treat guide dissection with 3.5 x 22 Resolute REZA    Presented as witnessed cardiac arrest at an event. He spent the a good part of the day outdoor working on a fence with Temp  F. Per EMS report no pulse on arrival then CPR then regained ROSC after one shock. ECG from EMS and on arrival at [facility] revealed afib with RVR + inferior lateral leads ST depression. He denied any palpitations or chest pain.       Initially decided to take him to the cath lab for angiography but reviewing his labs we cancelled the case. Cr 2.6 on admission and now down to 1.2 with IVFs. K+3- replaced this AM  NPO p MN for potential LHC if renal function is stable  Continue heparin gtt, asa, plavix, statin, BB.  No ACEI/ARB given renal fx  Echo pending

## 2019-10-07 NOTE — ASSESSMENT & PLAN NOTE
Presented with Cr 2.6 (baseline 1.1-1.2) multifactorial etiology given cardiac arrest, working in heat  Improved this AM to 1.2 with IVF administration   NS held- patient with SOB, SHIRLEY, appears volume overloaded this AM but not in any distress, holding off on diuresis

## 2019-10-07 NOTE — SUBJECTIVE & OBJECTIVE
Review of Systems   Constitution: Negative.   Cardiovascular: Positive for dyspnea on exertion. Negative for chest pain, irregular heartbeat, leg swelling, near-syncope, orthopnea, palpitations, paroxysmal nocturnal dyspnea and syncope.   Respiratory: Positive for shortness of breath. Negative for cough.    Gastrointestinal: Negative for abdominal pain and vomiting.   Neurological: Negative for dizziness, focal weakness, light-headedness and weakness.     Objective:     Vital Signs (Most Recent):  Temp: 98.6 °F (37 °C) (10/07/19 0744)  Pulse: 79 (10/07/19 0744)  Resp: 18 (10/07/19 0744)  BP: (!) 182/87 (10/07/19 0744)  SpO2: 98 % (10/07/19 0856) Vital Signs (24h Range):  Temp:  [96.7 °F (35.9 °C)-99 °F (37.2 °C)] 98.6 °F (37 °C)  Pulse:  [64-94] 79  Resp:  [18-34] 18  SpO2:  [93 %-100 %] 98 %  BP: (148-189)/(70-89) 182/87     Weight: 88.7 kg (195 lb 8.8 oz)  Body mass index is 28.06 kg/m².     SpO2: 98 %  O2 Device (Oxygen Therapy): room air      Intake/Output Summary (Last 24 hours) at 10/7/2019 1109  Last data filed at 10/7/2019 0956  Gross per 24 hour   Intake 3670.89 ml   Output 1500 ml   Net 2170.89 ml       Lines/Drains/Airways     Peripheral Intravenous Line                 Peripheral IV - Single Lumen 10/04/19 1600 18 G Left;Posterior Forearm 2 days         Peripheral IV - Single Lumen 10/04/19 1600 20 G Anterior;Right Antecubital 2 days                Physical Exam   Constitutional: He is oriented to person, place, and time. He appears well-developed and well-nourished. No distress.   HENT:   Head: Normocephalic and atraumatic.   Eyes: Right eye exhibits no discharge. Left eye exhibits no discharge.   Neck: No JVD present.   Cardiovascular: Normal rate. An irregularly irregular rhythm present.   Murmur heard.  Pulmonary/Chest: Effort normal. He has no wheezes. He has no rales.   Abdominal: Soft. Bowel sounds are normal.   Musculoskeletal: He exhibits no edema.   Neurological: He is alert and oriented to  person, place, and time.   Skin: Skin is warm and dry. He is not diaphoretic.   Psychiatric: He has a normal mood and affect. His behavior is normal. Judgment and thought content normal.       Significant Labs:   BMP:   Recent Labs   Lab 10/06/19  0415 10/07/19  0349   GLU 93 90    142   K 3.4* 3.1*   * 113*   CO2 21* 20*   BUN 24* 16   CREATININE 1.6* 1.2   CALCIUM 7.9* 7.6*   , CMP   Recent Labs   Lab 10/06/19  0415 10/07/19  0349    142   K 3.4* 3.1*   * 113*   CO2 21* 20*   GLU 93 90   BUN 24* 16   CREATININE 1.6* 1.2   CALCIUM 7.9* 7.6*   PROT 5.2* 4.9*   ALBUMIN 2.1* 1.9*   BILITOT 0.3 0.4   ALKPHOS 69 66   AST 53* 36   ALT 18 15   ANIONGAP 9 9   ESTGFRAFRICA 54* >60   EGFRNONAA 47* >60   , CBC   Recent Labs   Lab 10/06/19  0415   WBC 6.88   HGB 9.6*   HCT 29.2*      , INR No results for input(s): INR, PROTIME in the last 48 hours., Lipid Panel No results for input(s): CHOL, HDL, LDLCALC, TRIG, CHOLHDL in the last 48 hours., Troponin   Recent Labs   Lab 10/06/19  0415   TROPONINI 15.264*    and All pertinent lab results from the last 24 hours have been reviewed.    Significant Imaging: Echocardiogram:   2D echo with color flow doppler: No results found for this or any previous visit. and Transthoracic echo (TTE) complete (Cupid Only):   Results for orders placed or performed during the hospital encounter of 10/04/19   Echo Color Flow Doppler? Yes   Result Value Ref Range    BSA 2.09 m2    LA WIDTH 3.52 cm    AORTIC VALVE CUSP SEPERATION 2.19 cm    PV PEAK VELOCITY 1.10 cm/s    LVIDD 4.57 3.5 - 6.0 cm    IVS 1.18 (A) 0.6 - 1.1 cm    PW 1.02 0.6 - 1.1 cm    Ao root annulus 3.54 cm    LVIDS 3.61 2.1 - 4.0 cm    FS 21 28 - 44 %    LV mass 179.35 g    LA size 2.43 cm    RVDD 2.90 cm    Left Ventricle Relative Wall Thickness 0.45 cm    AV mean gradient 3 mmHg    AV valve area 2.79 cm2    AV Velocity Ratio 0.88     AV index (prosthetic) 0.78     E/A ratio 2.20     E wave decelartion  time 220.02 msec    IVRT 0.06 msec    Pulm vein S/D ratio 1.28     LVOT diameter 2.14 cm    LVOT area 3.6 cm2    LVOT peak jakob 0.98 m/s    LVOT peak VTI 18.97 cm    Ao peak jakob 1.12 m/s    Ao VTI 24.41 cm    LVOT stroke volume 68.20 cm3    AV peak gradient 5 mmHg    MV Peak E Jakob 1.08 m/s    TR Max Jakob 2.55 m/s    MV Peak A Jakob 0.49 m/s    PV Peak S Jakob 0.55 m/s    PV Peak D Jakob 0.43 m/s    LV Systolic Volume 54.88 mL    LV Systolic Volume Index 26.6 mL/m2    LV Diastolic Volume 95.75 mL    LV Diastolic Volume Index 46.37 mL/m2    LV Mass Index 87 g/m2    RA Major Axis 4.93 cm    Left Atrium Major Axis 5.48 cm    Triscuspid Valve Regurgitation Peak Gradient 26 mmHg

## 2019-10-07 NOTE — HPI
59 year old male with HTN, HLP, PAD, CAD s/p RCA PCI (3/2019) + preserved EF brought to the ED by EMS after a witnessed cardiac arrest at an event. He spent the a good part of the day outdoor working on a fence with Temp  F. Per EMS report no pulse on arrival then CPR then regained ROSC after one shock. ECG from EMS and on arrival at [facility] revealed afib with RVR + inferior lateral leads ST depression. He denied any palpitations or chest pain.         EMS treatment: 150 mg amiodarone     No previous history of afib        Pertinent labs:                  K 2.6              Cr 2.6              TNI 8.5              H/H 9.9/28.8                  BNP 99                          CO2 21  Initially decided to take him to the cath lab for angiography but reviewing his labs we cancelled the case              Plan:        Replace K  IV hydration  Echo to assess EF  Serial TNI + CPK  IV heparin           JOSE +/- DCCV  No amiodarone drip because of unknown onset of afib  If HR increases > 120 we will start cardizem drip for rate control            Close monitoring in ICU           Spoke with patient and wife at length  They both expressed verbal understanding

## 2019-10-07 NOTE — PLAN OF CARE
Pt AAO nx 4. VSS. NAD. IV fluids and heparin infusing continuously per mar. Tolerating diabetic diet. Cardiac monitored. No complaints of pain/nausea. Pt free from falls. Urinal at bedside for elimination. Safety maintained. Will continue to monitor.

## 2019-10-07 NOTE — PROGRESS NOTES
[facility]  Cardiology  Progress Note    Patient Name: Domingo Gross Sr.  MRN: [id number]  Admission Date: 10/4/2019  Hospital Length of Stay: 3 days  Code Status: No Order   Attending Physician: Keo Jenkins MD   Primary Care Physician: Analy Encarnacion MD  Expected Discharge Date:   Principal Problem:Cardiac arrest    Subjective:     Hospital Course:   10/07/2019 No chest pain. Troponin peaked at 27 and is trending down at this time. SBP 160s-180s Bidil initiated. Remains on heparin gtt. 4L intake, 1.2 L output and net +6L from admission. Cr improved to 1.2. K+ 3- replaced. NPO p MN for potential LHC in AM. TTE pending.         Review of Systems   Constitution: Negative.   Cardiovascular: Positive for dyspnea on exertion. Negative for chest pain, irregular heartbeat, leg swelling, near-syncope, orthopnea, palpitations, paroxysmal nocturnal dyspnea and syncope.   Respiratory: Positive for shortness of breath. Negative for cough.    Gastrointestinal: Negative for abdominal pain and vomiting.   Neurological: Negative for dizziness, focal weakness, light-headedness and weakness.     Objective:     Vital Signs (Most Recent):  Temp: 98.6 °F (37 °C) (10/07/19 0744)  Pulse: 79 (10/07/19 0744)  Resp: 18 (10/07/19 0744)  BP: (!) 182/87 (10/07/19 0744)  SpO2: 98 % (10/07/19 0856) Vital Signs (24h Range):  Temp:  [96.7 °F (35.9 °C)-99 °F (37.2 °C)] 98.6 °F (37 °C)  Pulse:  [64-94] 79  Resp:  [18-34] 18  SpO2:  [93 %-100 %] 98 %  BP: (148-189)/(70-89) 182/87     Weight: 88.7 kg (195 lb 8.8 oz)  Body mass index is 28.06 kg/m².     SpO2: 98 %  O2 Device (Oxygen Therapy): room air      Intake/Output Summary (Last 24 hours) at 10/7/2019 1109  Last data filed at 10/7/2019 0956  Gross per 24 hour   Intake 3670.89 ml   Output 1500 ml   Net 2170.89 ml       Lines/Drains/Airways     Peripheral Intravenous Line                 Peripheral IV - Single Lumen 10/04/19 1600 18 G Left;Posterior Forearm 2 days          Peripheral IV - Single Lumen 10/04/19 1600 20 G Anterior;Right Antecubital 2 days                Physical Exam   Constitutional: He is oriented to person, place, and time. He appears well-developed and well-nourished. No distress.   HENT:   Head: Normocephalic and atraumatic.   Eyes: Right eye exhibits no discharge. Left eye exhibits no discharge.   Neck: No JVD present.   Cardiovascular: Normal rate. An irregularly irregular rhythm present.   Murmur heard.  Pulmonary/Chest: Effort normal. He has no wheezes. He has no rales.   Abdominal: Soft. Bowel sounds are normal.   Musculoskeletal: He exhibits no edema.   Neurological: He is alert and oriented to person, place, and time.   Skin: Skin is warm and dry. He is not diaphoretic.   Psychiatric: He has a normal mood and affect. His behavior is normal. Judgment and thought content normal.       Significant Labs:   BMP:   Recent Labs   Lab 10/06/19  0415 10/07/19  0349   GLU 93 90    142   K 3.4* 3.1*   * 113*   CO2 21* 20*   BUN 24* 16   CREATININE 1.6* 1.2   CALCIUM 7.9* 7.6*   , CMP   Recent Labs   Lab 10/06/19  0415 10/07/19  0349    142   K 3.4* 3.1*   * 113*   CO2 21* 20*   GLU 93 90   BUN 24* 16   CREATININE 1.6* 1.2   CALCIUM 7.9* 7.6*   PROT 5.2* 4.9*   ALBUMIN 2.1* 1.9*   BILITOT 0.3 0.4   ALKPHOS 69 66   AST 53* 36   ALT 18 15   ANIONGAP 9 9   ESTGFRAFRICA 54* >60   EGFRNONAA 47* >60   , CBC   Recent Labs   Lab 10/06/19  0415   WBC 6.88   HGB 9.6*   HCT 29.2*      , INR No results for input(s): INR, PROTIME in the last 48 hours., Lipid Panel No results for input(s): CHOL, HDL, LDLCALC, TRIG, CHOLHDL in the last 48 hours., Troponin   Recent Labs   Lab 10/06/19  0415   TROPONINI 15.264*    and All pertinent lab results from the last 24 hours have been reviewed.    Significant Imaging: Echocardiogram:   2D echo with color flow doppler: No results found for this or any previous visit. and Transthoracic echo (TTE) complete (Cupid  Only):   Results for orders placed or performed during the hospital encounter of 10/04/19   Echo Color Flow Doppler? Yes   Result Value Ref Range    BSA 2.09 m2    LA WIDTH 3.52 cm    AORTIC VALVE CUSP SEPERATION 2.19 cm    PV PEAK VELOCITY 1.10 cm/s    LVIDD 4.57 3.5 - 6.0 cm    IVS 1.18 (A) 0.6 - 1.1 cm    PW 1.02 0.6 - 1.1 cm    Ao root annulus 3.54 cm    LVIDS 3.61 2.1 - 4.0 cm    FS 21 28 - 44 %    LV mass 179.35 g    LA size 2.43 cm    RVDD 2.90 cm    Left Ventricle Relative Wall Thickness 0.45 cm    AV mean gradient 3 mmHg    AV valve area 2.79 cm2    AV Velocity Ratio 0.88     AV index (prosthetic) 0.78     E/A ratio 2.20     E wave decelartion time 220.02 msec    IVRT 0.06 msec    Pulm vein S/D ratio 1.28     LVOT diameter 2.14 cm    LVOT area 3.6 cm2    LVOT peak jakob 0.98 m/s    LVOT peak VTI 18.97 cm    Ao peak jakob 1.12 m/s    Ao VTI 24.41 cm    LVOT stroke volume 68.20 cm3    AV peak gradient 5 mmHg    MV Peak E Jakob 1.08 m/s    TR Max Jakob 2.55 m/s    MV Peak A Jakob 0.49 m/s    PV Peak S Jakob 0.55 m/s    PV Peak D Jakob 0.43 m/s    LV Systolic Volume 54.88 mL    LV Systolic Volume Index 26.6 mL/m2    LV Diastolic Volume 95.75 mL    LV Diastolic Volume Index 46.37 mL/m2    LV Mass Index 87 g/m2    RA Major Axis 4.93 cm    Left Atrium Major Axis 5.48 cm    Triscuspid Valve Regurgitation Peak Gradient 26 mmHg     Assessment and Plan:     Brief HPI: Patient seen this morning on rounds without reports of chest pain. SHIRLEY reported. IVF on hold. Heparin gtt continued.     * Cardiac arrest  Per CAD    Acute renal failure  Presented with Cr 2.6 (baseline 1.1-1.2) multifactorial etiology given cardiac arrest, working in heat  Improved this AM to 1.2 with IVF administration   NS held- patient with SOB, SHIRLEY, appears volume overloaded this AM but not in any distress, holding off on diuresis        Hypokalemia  Replace with goal >4    Atrial fibrillation with RVR  Presently rate controlled in the 60s-90s  Continue BB  therapy    Will need OAC upon DC, plan for JOSE DCCV as outpatient after ischemic workup complete     Coronary artery disease involving native coronary artery of native heart with angina pectoris with documented spasm  03/2019  · S/p LHC via right radial  · LM, LAD, and LCX are patent with luminal irregularities  · RCA mid 95% calcified lesion  · Normal EF with LVEDP 8 mmHg  · PCI of mid LAD with 2.5 x 30 and 2.5 x 15 mm Resolute REZA  · PCI of proximal RCA to treat guide dissection with 3.5 x 22 Resolute REZA    Presented as witnessed cardiac arrest at an event. He spent the a good part of the day outdoor working on a fence with Temp  F. Per EMS report no pulse on arrival then CPR then regained ROSC after one shock. ECG from EMS and on arrival at [facility] revealed afib with RVR + inferior lateral leads ST depression. He denied any palpitations or chest pain.       Initially decided to take him to the cath lab for angiography but reviewing his labs we cancelled the case. Cr 2.6 on admission and now down to 1.2 with IVFs. K+3- replaced this AM  NPO p MN for potential LHC if renal function is stable  Continue heparin gtt, asa, plavix, statin, BB.  No ACEI/ARB given renal fx  Echo pending     PAD (peripheral artery disease)  Continue medical management with asa, statin, Plavix  Stable     HTN (hypertension)  SBP 160s-180s  Continue Toprol, Bidil added  Holding ACEI/ARB 2/2 BRETT         VTE Risk Mitigation (From admission, onward)         Ordered     heparin 25,000 units in dextrose 5% 250 mL (100 units/mL) infusion LOW INTENSITY nomogram - [facility]  Continuous      10/04/19 2051     heparin 25,000 units in dextrose 5% (100 units/ml) IV bolus from bag - ADDITIONAL PRN BOLUS - 60 units/kg  As needed (PRN)      10/04/19 2051     heparin 25,000 units in dextrose 5% (100 units/ml) IV bolus from bag - ADDITIONAL PRN BOLUS - 30 units/kg  As needed (PRN)      10/04/19 2051                Karlos Zimmerman  NP  Cardiology  [facility]

## 2019-10-07 NOTE — HOSPITAL COURSE
10/07/2019 No chest pain. Troponin peaked at 27 and is trending down at this time. SBP 160s-180s Bidil initiated. Remains on heparin gtt. 4L intake, 1.2 L output and net +6L from admission. Cr improved to 1.2. K+ 3- replaced. NPO p MN for potential LHC in AM. TTE pending.   10/08/2019   S/p LHC via R radial                LM patent              LAD patent              Ostial LCX 99%              Patent RCA stents with distal 60% stenosis                 LVEDP 12 mmHg              No LV gram because of recent recovery from ARF   10/09/2019 Hemodynamically stable overnight without chest pain. SR/ST on telemetry. Amiodarone initiated. K+2.9- replaced. Cr stable 1.2.   10/10/2019 No chest pain overnight. Remains in SR. K+ 3.6 replacement given. Hemodynamically stable. NPO p MN for LHC in AM.

## 2019-10-07 NOTE — PLAN OF CARE
CM visited, introduced to role in d/c planning.  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  Pt states he follows closely with Dr Jenkins in clinic, aware he will see in rounds later.  Pt denies any needs at d/c.      Discharge planning brochure provided. White board updated with CM name & contact info.  Pt encouraged to call with any questions or needs. CM will continue to follow patient throughout the transitions of care, and assist with any discharge needs.       10/07/19 1100   Discharge Assessment   Assessment Type Discharge Planning Assessment   Confirmed/corrected address and phone number on facesheet? Yes   Assessment information obtained from? Patient   Expected Length of Stay (days) 2   Communicated expected length of stay with patient/caregiver yes   Prior to hospitilization cognitive status: Alert/Oriented   Prior to hospitalization functional status: Independent   Current cognitive status: Alert/Oriented   Current Functional Status: Independent   Lives With spouse   Able to Return to Prior Arrangements yes   Is patient able to care for self after discharge? Yes   Who are your caregiver(s) and their phone number(s)?   (wife Karmen [phone])   Readmission Within the Last 30 Days no previous admission in last 30 days   Patient currently being followed by outpatient case management? No   Patient currently receives any other outside agency services? No   Equipment Currently Used at Home none   Do you have any problems affording any of your prescribed medications? No   Is the patient taking medications as prescribed? yes   Does the patient have transportation home? Yes   Transportation Anticipated family or friend will provide   Does the patient receive services at the Coumadin Clinic? No   Discharge Plan B Home   DME Needed Upon Discharge  none   Patient/Family in Agreement with Plan yes

## 2019-10-07 NOTE — PLAN OF CARE
"VN cued into room.  Pt was ambulating in room.  Reminded pt he was a fall risk and had IV fluids running.  Pt stated he felt "great".  Pt stated no chest pain, dizziness or sob.  Allowed time for questions.  Pt had no needs or complaints at this time.  VN will continue to follow.    "

## 2019-10-07 NOTE — ASSESSMENT & PLAN NOTE
Presently rate controlled in the 60s-90s  Continue BB therapy    Will need OAC upon DC, plan for JOSE DCCV as outpatient after ischemic workup complete

## 2019-10-08 LAB
ALBUMIN SERPL BCP-MCNC: 2.1 G/DL (ref 3.5–5.2)
ALP SERPL-CCNC: 77 U/L (ref 55–135)
ALT SERPL W/O P-5'-P-CCNC: 18 U/L (ref 10–44)
ANION GAP SERPL CALC-SCNC: 11 MMOL/L (ref 8–16)
ANION GAP SERPL CALC-SCNC: 11 MMOL/L (ref 8–16)
APTT BLDCRRT: 45.8 SEC (ref 21–32)
AST SERPL-CCNC: 31 U/L (ref 10–40)
BILIRUB SERPL-MCNC: 0.4 MG/DL (ref 0.1–1)
BUN SERPL-MCNC: 16 MG/DL (ref 6–20)
BUN SERPL-MCNC: 16 MG/DL (ref 6–20)
CALCIUM SERPL-MCNC: 8.4 MG/DL (ref 8.7–10.5)
CALCIUM SERPL-MCNC: 8.4 MG/DL (ref 8.7–10.5)
CHLORIDE SERPL-SCNC: 110 MMOL/L (ref 95–110)
CHLORIDE SERPL-SCNC: 110 MMOL/L (ref 95–110)
CO2 SERPL-SCNC: 21 MMOL/L (ref 23–29)
CO2 SERPL-SCNC: 21 MMOL/L (ref 23–29)
CREAT SERPL-MCNC: 1.2 MG/DL (ref 0.5–1.4)
CREAT SERPL-MCNC: 1.2 MG/DL (ref 0.5–1.4)
ERYTHROCYTE [DISTWIDTH] IN BLOOD BY AUTOMATED COUNT: 13.2 % (ref 11.5–14.5)
EST. GFR  (AFRICAN AMERICAN): >60 ML/MIN/1.73 M^2
EST. GFR  (AFRICAN AMERICAN): >60 ML/MIN/1.73 M^2
EST. GFR  (NON AFRICAN AMERICAN): >60 ML/MIN/1.73 M^2
EST. GFR  (NON AFRICAN AMERICAN): >60 ML/MIN/1.73 M^2
GLUCOSE SERPL-MCNC: 93 MG/DL (ref 70–110)
GLUCOSE SERPL-MCNC: 93 MG/DL (ref 70–110)
HCT VFR BLD AUTO: 28.4 % (ref 40–54)
HGB BLD-MCNC: 9.5 G/DL (ref 14–18)
MCH RBC QN AUTO: 27.1 PG (ref 27–31)
MCHC RBC AUTO-ENTMCNC: 33.5 G/DL (ref 32–36)
MCV RBC AUTO: 81 FL (ref 82–98)
PLATELET # BLD AUTO: 244 K/UL (ref 150–350)
PMV BLD AUTO: 9.2 FL (ref 9.2–12.9)
POC ACTIVATED CLOTTING TIME K: 153 SEC (ref 74–137)
POCT GLUCOSE: 93 MG/DL (ref 70–110)
POTASSIUM SERPL-SCNC: 3.2 MMOL/L (ref 3.5–5.1)
POTASSIUM SERPL-SCNC: 3.2 MMOL/L (ref 3.5–5.1)
PROT SERPL-MCNC: 5.4 G/DL (ref 6–8.4)
RBC # BLD AUTO: 3.51 M/UL (ref 4.6–6.2)
SAMPLE: ABNORMAL
SODIUM SERPL-SCNC: 142 MMOL/L (ref 136–145)
SODIUM SERPL-SCNC: 142 MMOL/L (ref 136–145)
WBC # BLD AUTO: 6.99 K/UL (ref 3.9–12.7)

## 2019-10-08 PROCEDURE — 25000003 PHARM REV CODE 250: Performed by: INTERNAL MEDICINE

## 2019-10-08 PROCEDURE — C1887 CATHETER, GUIDING: HCPCS | Performed by: INTERNAL MEDICINE

## 2019-10-08 PROCEDURE — 93458 L HRT ARTERY/VENTRICLE ANGIO: CPT | Performed by: INTERNAL MEDICINE

## 2019-10-08 PROCEDURE — 25500020 PHARM REV CODE 255: Performed by: INTERNAL MEDICINE

## 2019-10-08 PROCEDURE — 99152 MOD SED SAME PHYS/QHP 5/>YRS: CPT | Performed by: INTERNAL MEDICINE

## 2019-10-08 PROCEDURE — 85347 COAGULATION TIME ACTIVATED: CPT | Performed by: INTERNAL MEDICINE

## 2019-10-08 PROCEDURE — 25000003 PHARM REV CODE 250: Performed by: NURSE PRACTITIONER

## 2019-10-08 PROCEDURE — 11000001 HC ACUTE MED/SURG PRIVATE ROOM

## 2019-10-08 PROCEDURE — 94761 N-INVAS EAR/PLS OXIMETRY MLT: CPT

## 2019-10-08 PROCEDURE — 93458 L HRT ARTERY/VENTRICLE ANGIO: CPT | Mod: 26,,, | Performed by: INTERNAL MEDICINE

## 2019-10-08 PROCEDURE — 80053 COMPREHEN METABOLIC PANEL: CPT

## 2019-10-08 PROCEDURE — 36415 COLL VENOUS BLD VENIPUNCTURE: CPT

## 2019-10-08 PROCEDURE — 99152 MOD SED SAME PHYS/QHP 5/>YRS: CPT | Mod: ,,, | Performed by: INTERNAL MEDICINE

## 2019-10-08 PROCEDURE — 85730 THROMBOPLASTIN TIME PARTIAL: CPT

## 2019-10-08 PROCEDURE — 63600175 PHARM REV CODE 636 W HCPCS: Performed by: INTERNAL MEDICINE

## 2019-10-08 PROCEDURE — 93458 PR CATH PLACE/CORON ANGIO, IMG SUPER/INTERP,W LEFT HEART VENTRICULOGRAPHY: ICD-10-PCS | Mod: 26,,, | Performed by: INTERNAL MEDICINE

## 2019-10-08 PROCEDURE — 99152 PR MOD CONSCIOUS SEDATION, SAME PHYS, 5+ YRS, FIRST 15 MIN: ICD-10-PCS | Mod: ,,, | Performed by: INTERNAL MEDICINE

## 2019-10-08 PROCEDURE — 27000221 HC OXYGEN, UP TO 24 HOURS

## 2019-10-08 PROCEDURE — 85027 COMPLETE CBC AUTOMATED: CPT

## 2019-10-08 PROCEDURE — C1894 INTRO/SHEATH, NON-LASER: HCPCS | Performed by: INTERNAL MEDICINE

## 2019-10-08 PROCEDURE — C1769 GUIDE WIRE: HCPCS | Performed by: INTERNAL MEDICINE

## 2019-10-08 RX ORDER — VERAPAMIL HYDROCHLORIDE 2.5 MG/ML
INJECTION, SOLUTION INTRAVENOUS
Status: DISCONTINUED | OUTPATIENT
Start: 2019-10-08 | End: 2019-10-08 | Stop reason: HOSPADM

## 2019-10-08 RX ORDER — CLOPIDOGREL BISULFATE 75 MG/1
TABLET ORAL
Status: DISCONTINUED | OUTPATIENT
Start: 2019-10-08 | End: 2019-10-08 | Stop reason: HOSPADM

## 2019-10-08 RX ORDER — ASPIRIN 81 MG/1
TABLET ORAL
Status: DISCONTINUED | OUTPATIENT
Start: 2019-10-08 | End: 2019-10-08 | Stop reason: HOSPADM

## 2019-10-08 RX ORDER — IODIXANOL 320 MG/ML
INJECTION, SOLUTION INTRAVASCULAR
Status: DISCONTINUED | OUTPATIENT
Start: 2019-10-08 | End: 2019-10-08 | Stop reason: HOSPADM

## 2019-10-08 RX ORDER — HEPARIN SODIUM 200 [USP'U]/100ML
INJECTION, SOLUTION INTRAVENOUS
Status: DISCONTINUED | OUTPATIENT
Start: 2019-10-08 | End: 2019-10-12 | Stop reason: HOSPADM

## 2019-10-08 RX ORDER — DIPHENHYDRAMINE HCL 25 MG
50 CAPSULE ORAL ONCE
Status: DISCONTINUED | OUTPATIENT
Start: 2019-10-08 | End: 2019-10-08 | Stop reason: HOSPADM

## 2019-10-08 RX ORDER — FENTANYL CITRATE 50 UG/ML
INJECTION, SOLUTION INTRAMUSCULAR; INTRAVENOUS
Status: DISCONTINUED | OUTPATIENT
Start: 2019-10-08 | End: 2019-10-08 | Stop reason: HOSPADM

## 2019-10-08 RX ORDER — LIDOCAINE HYDROCHLORIDE 10 MG/ML
INJECTION, SOLUTION EPIDURAL; INFILTRATION; INTRACAUDAL; PERINEURAL
Status: DISCONTINUED | OUTPATIENT
Start: 2019-10-08 | End: 2019-10-08 | Stop reason: HOSPADM

## 2019-10-08 RX ORDER — AMLODIPINE BESYLATE 5 MG/1
5 TABLET ORAL DAILY
Status: DISCONTINUED | OUTPATIENT
Start: 2019-10-08 | End: 2019-10-09

## 2019-10-08 RX ORDER — HEPARIN SODIUM 1000 [USP'U]/ML
INJECTION, SOLUTION INTRAVENOUS; SUBCUTANEOUS
Status: DISCONTINUED | OUTPATIENT
Start: 2019-10-08 | End: 2019-10-08 | Stop reason: HOSPADM

## 2019-10-08 RX ORDER — MIDAZOLAM HYDROCHLORIDE 1 MG/ML
INJECTION, SOLUTION INTRAMUSCULAR; INTRAVENOUS
Status: DISCONTINUED | OUTPATIENT
Start: 2019-10-08 | End: 2019-10-08 | Stop reason: HOSPADM

## 2019-10-08 RX ORDER — METOPROLOL TARTRATE 1 MG/ML
INJECTION, SOLUTION INTRAVENOUS
Status: DISCONTINUED | OUTPATIENT
Start: 2019-10-08 | End: 2019-10-08 | Stop reason: HOSPADM

## 2019-10-08 RX ORDER — SODIUM CHLORIDE 9 MG/ML
INJECTION, SOLUTION INTRAVENOUS CONTINUOUS
Status: DISCONTINUED | OUTPATIENT
Start: 2019-10-08 | End: 2019-10-12 | Stop reason: HOSPADM

## 2019-10-08 RX ORDER — ACETAMINOPHEN 325 MG/1
650 TABLET ORAL EVERY 4 HOURS PRN
Status: DISCONTINUED | OUTPATIENT
Start: 2019-10-08 | End: 2019-10-11

## 2019-10-08 RX ORDER — SODIUM CHLORIDE 9 MG/ML
INJECTION, SOLUTION INTRAVENOUS
Status: DISCONTINUED | OUTPATIENT
Start: 2019-10-08 | End: 2019-10-12 | Stop reason: HOSPADM

## 2019-10-08 RX ORDER — FUROSEMIDE 10 MG/ML
INJECTION INTRAMUSCULAR; INTRAVENOUS
Status: DISCONTINUED | OUTPATIENT
Start: 2019-10-08 | End: 2019-10-08 | Stop reason: HOSPADM

## 2019-10-08 RX ADMIN — ISOSORBIDE DINITRATE 20 MG: 10 TABLET ORAL at 09:10

## 2019-10-08 RX ADMIN — HYDRALAZINE HYDROCHLORIDE 25 MG: 25 TABLET, FILM COATED ORAL at 06:10

## 2019-10-08 RX ADMIN — CILOSTAZOL 100 MG: 50 TABLET ORAL at 10:10

## 2019-10-08 RX ADMIN — HYDRALAZINE HYDROCHLORIDE 25 MG: 25 TABLET, FILM COATED ORAL at 02:10

## 2019-10-08 RX ADMIN — HEPARIN SODIUM AND DEXTROSE 16 UNITS/KG/HR: 10000; 5 INJECTION INTRAVENOUS at 01:10

## 2019-10-08 RX ADMIN — METOPROLOL SUCCINATE 100 MG: 50 TABLET, EXTENDED RELEASE ORAL at 09:10

## 2019-10-08 RX ADMIN — ISOSORBIDE DINITRATE 20 MG: 10 TABLET ORAL at 02:10

## 2019-10-08 RX ADMIN — HYDRALAZINE HYDROCHLORIDE 25 MG: 25 TABLET, FILM COATED ORAL at 09:10

## 2019-10-08 RX ADMIN — ATORVASTATIN CALCIUM 40 MG: 40 TABLET, FILM COATED ORAL at 09:10

## 2019-10-08 RX ADMIN — CILOSTAZOL 100 MG: 50 TABLET ORAL at 09:10

## 2019-10-08 RX ADMIN — AMLODIPINE BESYLATE 5 MG: 5 TABLET ORAL at 09:10

## 2019-10-08 NOTE — PLAN OF CARE
VN cued into room.  Pt resting comfortably in bed with call light and personal belongings in reach.  NADN.  No family at bedside.  Pt stated he was feeling much better.  Pt had no needs or complaints at this time.  VN will continue to follow.

## 2019-10-08 NOTE — PLAN OF CARE
Pt received in cath lab recovery per maci per hetal bach and received bedside report; pt alert; pt urinated upon arrival in recovery; pt denies any pain; sinus w/ pvc s on monitor; o2 per cannula in use

## 2019-10-08 NOTE — BRIEF OP NOTE
S/p LHC via R radial        LM patent   LAD patent   Ostial LCX 99%   Patent RCA stents with distal 60% stenosis     LVEDP 12 mmHg   No LV gram because of recent recovery from ARF        Plan:       Stage LCX PCI   Wire both LXC and OM1   Stent across LCX into OM1   Adjust medical therapy    Add amiodarone for maintenance of NSR

## 2019-10-08 NOTE — NURSING
Dr jeffrey charles at bedside; pt aao; no complaints; sinus w/ pvc s on monitor; pt has 2 iv access sites w/o any redness or swelling; skin wm dry color pink; dressing cdi to right radial site w/o any bleeding pain swelling nor hematoma and 2+ radial pulses and brisk cap refill; nasal o2 in use

## 2019-10-08 NOTE — PLAN OF CARE
MET called at 0809.  VN unable to visualize.  Pt out in [place].  VN will continue to monitor room until pt returns.

## 2019-10-08 NOTE — PLAN OF CARE
Reported no pain.  Has been NPO since 10/08 @0000.  Pt very pleasant. Slept comfortable during the night.  10/7/19 aptt early morning draw: 45.2, therapeutic.     Tele: SR,  HR 80  No alarms.     Bed in lowest position, wheels locked, non skid socks, ID band worn, personal items and call bell with in reach, bed alarm set.

## 2019-10-08 NOTE — NURSING
Report given to Cath lab nurse at 08:00. Requested pt's morning medications be given and to shave patient. Medications pulled from pyxis and shaving device grabbed, upon return cath labs nurses at bedside and had patient ambulate to their stretcher. Pt reports feeling dizzy and SOB, pale to face. Rapid called and code cart at bedside. Pt placed on oxygen non rebreather sat 96%. VSS, pt reports he had stood up too quickly. Accucheck normal. After 5 minutes pt reported feeling better. Pt placed on nasal cannula. Color to face normal. VSS. Cath lab nurses given pt's medications to be given in pre-op area. Pt transported via cath lab nurses x 2, on monitor. Shayy Blum RN

## 2019-10-08 NOTE — NURSING
Report updated to eric on 4th floor; pt connected to tele box; pt to floor per stretcher with nurse; pt aao; pt has no belongings; pt has no complaints

## 2019-10-08 NOTE — NURSING
Bedside report to eric and she assumed care; pt aao and sitting on side of bed and transferred self; right radial dressing cdi and no pain swelling bleeding nor hematoma; pt had no belongings with him and on portable tele box; spouse at bedside and updated

## 2019-10-08 NOTE — INTERVAL H&P NOTE
The patient has been examined and the H&P has been reviewed:        Anesthesia/Surgery risks, benefits and alternative options discussed and understood by patient/family.          Active Hospital Problems    Diagnosis  POA    *Cardiac arrest [I46.9]  Yes     Priority: High    Atherosclerosis of native artery of both lower extremities with intermittent claudication [I70.213]  Yes     Priority: High    Atrial fibrillation with RVR [I48.91]  Yes    Syncope [R55]  Yes    Hypokalemia [E87.6]  Yes    Acute renal failure [N17.9]  Yes    Bilateral carotid artery stenosis [I65.23]  Yes    Coronary artery disease involving native coronary artery of native heart with angina pectoris with documented spasm [I25.111]  Yes         3/29/2019    S/p LHC via R radial           LM, LAD, and LCX are patent with luminal irregularities  RCA mid 95% calcified lesion  Normal EF with LVEDP 8 mmHg           PCI of mid LAD with 2.5 x 30 and 2.5 x 15 mm Resolute REZA  PCI of proximal RCA to treat guide dissection with 3.5 x 22 Resolute REZA             Abnormal cardiovascular stress test [R94.39]  Yes         Nuclear stress test 2/2019     + ECG with 2 mm ST depression   No wall motion abnormalities   Test stopped at 6 minutes, 7 METs, 86% predicted heart rate   Stopped because of R leg claudication + ECG changes            Venous insufficiency of both lower extremities [I87.2]  Yes    History of back injury [Z87.828]  Not Applicable     20 years ago a bag fell on his back at work      Claudication in peripheral vascular disease [I73.9]  Yes    PAD (peripheral artery disease) [I73.9]  Yes     6/13/2014      1. Three vessel runoff below the knee bilaterally.  2. Severe disease of the terminal aorta.  3. Successful PTAS.  4. Bilateral common iliac reconstruction with 8 x 39 mm stent extending in the aorta  5. Right common illiac was treated with an overlapping 9 x 60 mm SES   6. Left common iliac was treated with an overlapping 8 x 80  mm SES down to external iliac  7. All stents were post dilated with 9.0 x 60 mm balloons  8. Moderate bilateral SFA disease  9. Chronically occluded left internal iliac artery        6/12/2015      1. 80% mid left SFA with a 20 mmHg resting mean gradient  2. Atherectomy with 2.2 Phonenix Volcano catheter  3. PTA with 6.0 x 100 mm Lutonix drug coated balloon        6/9/2017      Patent bilateral GRUPO/EIA stents  L EIA PTAS 9.0 x 80 mm Life stent post dilated with 8.0 mm balloon  Bilateral 70-80% SFA stenosis with 3 vessel run off          3/2018      L SFA intervention for claudication   75% L SFA with 3 vessel run off   7 mmHg resting and 33 mmHg hyperemic mean gradient         L CFA antegrade access   PTA with 6.0 x 150 mm   PTA with 6.0 x 150 mm Lutonix   3 vessel run off           4/13/2018       S/p R SFA PTA for winsome IIb claudication   R CFA antegrade access         R CFA with 50% stenosis-heavily calcified lesion               Baseline /90         R SFA with 70% stenosis with a significant resting and hyperemic mean gradient     3 vessel run off             PTA of R SFA with 6.0 x 150 + 6.0 x 60 mm Lutonix DCB        5/2/2018   Patent arteries post revascularization        2/2019     R 0.84 to 0.27   L 0.90 to 0.66   After ambulation   Severe R calf claudication        Peripheral angiogram 5/10/2019                   95% R CFA stenosis; heavily calcified    Patent SFA, POP + 3 vessel run off      Peripheral intervention 7/12/2019    S/p right CFA revascularization for winsome IIb claudication       Assisted JOSY approach   L radial access for visualization   5-6 slender R PT access for delivery of therapy          Atherectomy with SilverHawk catheter   PTA with 7.0 x 40 mm Lutonix DCB            Atherosclerosis of leg with intermittent claudication [I70.219]  Yes     Winsome IIB      HTN (hypertension) [I10]  Yes      Resolved Hospital Problems   No resolved problems to display.         He is here  for LHC via R radial  There was a delay because of ARF on admission      Risks, benefits and alternatives of cardiac catheterization and possible intervention were discussed with the patient. All questions were answered and informed consent obtained.     I discussed the importance of compliance with dual antiplatelet therapy with the patient to prevent acute or late stent thrombosis with premature discontinuation of the therapy.

## 2019-10-08 NOTE — NURSING
Pt has redness on her chest and arms that she has had since she arrived. Pt denies any new foods or soaps or other agents that may have caused an allergic reaction - meds given IV. NS infusing IV via IV pump. Call bell in reach.      Fe Green RN  04/02/19 [id number] Spouse at bedside and updated

## 2019-10-08 NOTE — NURSING
Pt tolerating po and repositions self in bed; pt urinating w/o difficulty; sinus on monitor w/ pvc s; pt has no complaints; nasal o2 in use; right radial vasc band intact w/ 2+ pulse and brisk cap refill and no swelling or bleeding

## 2019-10-08 NOTE — NURSING
Post procedure instructions reviewed with patient; pt alert and denies any complaints; skin wm dry color pink; will continue to monitor; sinus w/ pvc s on monitor

## 2019-10-08 NOTE — NURSING
Removed nasal cannula off of patient. Waited approximately 20 minutes, pt 98% on RA. Denies SOB. Will monitor. LINDA

## 2019-10-08 NOTE — NURSING
Report called to floor to Shayy; told he had some morning meds in recovery; iv fluids stopped due to told had fluid overload and was given iv lasix in intra cath lab; cmp was drawn in recovery as was ordered this am

## 2019-10-08 NOTE — NURSING
Pt arrived from cath lab approximately 11:45, VSS. Gauze and tape to R wrist, no bleeding, no swelling noted. Pt denies pain, denies dizziness, denies SOB. Meal tray ordered for patient. Will continue to monitor. LINDA

## 2019-10-09 LAB
ALBUMIN SERPL BCP-MCNC: 2.1 G/DL (ref 3.5–5.2)
ALP SERPL-CCNC: 81 U/L (ref 55–135)
ALT SERPL W/O P-5'-P-CCNC: 19 U/L (ref 10–44)
ANION GAP SERPL CALC-SCNC: 11 MMOL/L (ref 8–16)
APTT BLDCRRT: 27.7 SEC (ref 21–32)
AST SERPL-CCNC: 29 U/L (ref 10–40)
BILIRUB SERPL-MCNC: 0.4 MG/DL (ref 0.1–1)
BUN SERPL-MCNC: 17 MG/DL (ref 6–20)
CALCIUM SERPL-MCNC: 8.5 MG/DL (ref 8.7–10.5)
CHLORIDE SERPL-SCNC: 110 MMOL/L (ref 95–110)
CO2 SERPL-SCNC: 22 MMOL/L (ref 23–29)
CREAT SERPL-MCNC: 1.2 MG/DL (ref 0.5–1.4)
EST. GFR  (AFRICAN AMERICAN): >60 ML/MIN/1.73 M^2
EST. GFR  (NON AFRICAN AMERICAN): >60 ML/MIN/1.73 M^2
GLUCOSE SERPL-MCNC: 92 MG/DL (ref 70–110)
MAGNESIUM SERPL-MCNC: 1.3 MG/DL (ref 1.6–2.6)
POTASSIUM SERPL-SCNC: 2.9 MMOL/L (ref 3.5–5.1)
POTASSIUM SERPL-SCNC: 3 MMOL/L (ref 3.5–5.1)
PROT SERPL-MCNC: 5.6 G/DL (ref 6–8.4)
SODIUM SERPL-SCNC: 143 MMOL/L (ref 136–145)

## 2019-10-09 PROCEDURE — 25000003 PHARM REV CODE 250: Performed by: NURSE PRACTITIONER

## 2019-10-09 PROCEDURE — 99233 PR SUBSEQUENT HOSPITAL CARE,LEVL III: ICD-10-PCS | Mod: ,,, | Performed by: INTERNAL MEDICINE

## 2019-10-09 PROCEDURE — 83735 ASSAY OF MAGNESIUM: CPT

## 2019-10-09 PROCEDURE — 99233 SBSQ HOSP IP/OBS HIGH 50: CPT | Mod: ,,, | Performed by: INTERNAL MEDICINE

## 2019-10-09 PROCEDURE — 11000001 HC ACUTE MED/SURG PRIVATE ROOM

## 2019-10-09 PROCEDURE — 36415 COLL VENOUS BLD VENIPUNCTURE: CPT

## 2019-10-09 PROCEDURE — 84132 ASSAY OF SERUM POTASSIUM: CPT

## 2019-10-09 PROCEDURE — 85730 THROMBOPLASTIN TIME PARTIAL: CPT

## 2019-10-09 PROCEDURE — 94761 N-INVAS EAR/PLS OXIMETRY MLT: CPT

## 2019-10-09 PROCEDURE — 25000003 PHARM REV CODE 250: Performed by: INTERNAL MEDICINE

## 2019-10-09 PROCEDURE — 63600175 PHARM REV CODE 636 W HCPCS: Performed by: NURSE PRACTITIONER

## 2019-10-09 PROCEDURE — 80053 COMPREHEN METABOLIC PANEL: CPT

## 2019-10-09 RX ORDER — ENOXAPARIN SODIUM 100 MG/ML
30 INJECTION SUBCUTANEOUS EVERY 24 HOURS
Status: DISCONTINUED | OUTPATIENT
Start: 2019-10-09 | End: 2019-10-09 | Stop reason: DRUGHIGH

## 2019-10-09 RX ORDER — AMLODIPINE BESYLATE 5 MG/1
10 TABLET ORAL DAILY
Status: DISCONTINUED | OUTPATIENT
Start: 2019-10-09 | End: 2019-10-12 | Stop reason: HOSPADM

## 2019-10-09 RX ORDER — POTASSIUM CHLORIDE 20 MEQ/1
40 TABLET, EXTENDED RELEASE ORAL
Status: COMPLETED | OUTPATIENT
Start: 2019-10-09 | End: 2019-10-09

## 2019-10-09 RX ORDER — AMIODARONE HYDROCHLORIDE 200 MG/1
400 TABLET ORAL 2 TIMES DAILY
Status: DISCONTINUED | OUTPATIENT
Start: 2019-10-09 | End: 2019-10-12 | Stop reason: HOSPADM

## 2019-10-09 RX ORDER — MAGNESIUM SULFATE HEPTAHYDRATE 40 MG/ML
2 INJECTION, SOLUTION INTRAVENOUS ONCE
Status: COMPLETED | OUTPATIENT
Start: 2019-10-09 | End: 2019-10-09

## 2019-10-09 RX ORDER — ENOXAPARIN SODIUM 100 MG/ML
40 INJECTION SUBCUTANEOUS EVERY 24 HOURS
Status: DISCONTINUED | OUTPATIENT
Start: 2019-10-09 | End: 2019-10-12 | Stop reason: HOSPADM

## 2019-10-09 RX ADMIN — POTASSIUM CHLORIDE 40 MEQ: 20 TABLET, EXTENDED RELEASE ORAL at 12:10

## 2019-10-09 RX ADMIN — CILOSTAZOL 100 MG: 50 TABLET ORAL at 10:10

## 2019-10-09 RX ADMIN — ISOSORBIDE DINITRATE 20 MG: 10 TABLET ORAL at 04:10

## 2019-10-09 RX ADMIN — ISOSORBIDE DINITRATE 20 MG: 10 TABLET ORAL at 08:10

## 2019-10-09 RX ADMIN — HYDRALAZINE HYDROCHLORIDE 25 MG: 25 TABLET, FILM COATED ORAL at 04:10

## 2019-10-09 RX ADMIN — HYDRALAZINE HYDROCHLORIDE 25 MG: 25 TABLET, FILM COATED ORAL at 10:10

## 2019-10-09 RX ADMIN — POTASSIUM CHLORIDE 40 MEQ: 20 TABLET, EXTENDED RELEASE ORAL at 08:10

## 2019-10-09 RX ADMIN — AMLODIPINE BESYLATE 10 MG: 5 TABLET ORAL at 08:10

## 2019-10-09 RX ADMIN — METOPROLOL SUCCINATE 100 MG: 50 TABLET, EXTENDED RELEASE ORAL at 08:10

## 2019-10-09 RX ADMIN — ATORVASTATIN CALCIUM 40 MG: 40 TABLET, FILM COATED ORAL at 10:10

## 2019-10-09 RX ADMIN — AMIODARONE HYDROCHLORIDE 400 MG: 200 TABLET ORAL at 10:10

## 2019-10-09 RX ADMIN — HYDRALAZINE HYDROCHLORIDE 25 MG: 25 TABLET, FILM COATED ORAL at 05:10

## 2019-10-09 RX ADMIN — AMIODARONE HYDROCHLORIDE 400 MG: 200 TABLET ORAL at 12:10

## 2019-10-09 RX ADMIN — ENOXAPARIN SODIUM 40 MG: 100 INJECTION SUBCUTANEOUS at 04:10

## 2019-10-09 RX ADMIN — POTASSIUM CHLORIDE 40 MEQ: 20 TABLET, EXTENDED RELEASE ORAL at 06:10

## 2019-10-09 RX ADMIN — ISOSORBIDE DINITRATE 20 MG: 10 TABLET ORAL at 10:10

## 2019-10-09 RX ADMIN — MAGNESIUM SULFATE IN WATER 2 G: 40 INJECTION, SOLUTION INTRAVENOUS at 04:10

## 2019-10-09 RX ADMIN — CLOPIDOGREL BISULFATE 75 MG: 75 TABLET ORAL at 08:10

## 2019-10-09 RX ADMIN — POTASSIUM CHLORIDE 40 MEQ: 20 TABLET, EXTENDED RELEASE ORAL at 04:10

## 2019-10-09 RX ADMIN — Medication 81 MG: at 08:10

## 2019-10-09 NOTE — PLAN OF CARE
VN rounds: VN cued into pt's room with pt's permission. Pt resting in bed. Fall risk protocol discussed with pt. VN instructed pt to call for assistance. Pt aware and agreeable. NAD noted. Allowed time for questions.  Will cont to be available and intervene as needed.     10/09/19 1539   Type of Frequent Check   Type Patient Rounds;Other (see comments)  (vn round)   Safety/Activity   Patient Rounds visualized patient;call light in patient/parent reach   Safety Promotion/Fall Prevention instructed to call staff for mobility;Fall Risk reviewed with patient/family   Positioning   Body Position supine   Head of Bed (HOB) HOB elevated   Pain/Comfort/Sleep   Preferred Pain Scale word (verbal rating pain scale)   Pain Rating (0-10): Rest 0   Sleep/Rest/Relaxation awake;no problem identified

## 2019-10-09 NOTE — PLAN OF CARE
Pt very pleasant, reported no pain, R radial dsg soft DCI w/no bruise    Tele: SR,  HR 70-80,  No alarms.     Bed in lowest position, wheels locked, non skid socks, ID band worn, personal items and call bell with in reach, bed alarm set.

## 2019-10-09 NOTE — ASSESSMENT & PLAN NOTE
Presented with Cr 2.6 (baseline 1.1-1.2) multifactorial etiology given cardiac arrest, working in heat  Improved - 1.2 with IVF administration

## 2019-10-09 NOTE — SUBJECTIVE & OBJECTIVE
Review of Systems   Constitution: Negative.   Cardiovascular: Negative for chest pain, dyspnea on exertion, irregular heartbeat, leg swelling, near-syncope, orthopnea, palpitations, paroxysmal nocturnal dyspnea and syncope.   Respiratory: Negative for cough and shortness of breath.    Gastrointestinal: Negative for abdominal pain and vomiting.   Neurological: Negative for dizziness, focal weakness, light-headedness and weakness.     Objective:     Vital Signs (Most Recent):  Temp: 97 °F (36.1 °C) (10/09/19 0816)  Pulse: 82 (10/09/19 0816)  Resp: 19 (10/09/19 0816)  BP: (!) 160/77(Nurse Notified) (10/09/19 0816)  SpO2: 98 % (10/09/19 0816) Vital Signs (24h Range):  Temp:  [97 °F (36.1 °C)-99.5 °F (37.5 °C)] 97 °F (36.1 °C)  Pulse:  [68-95] 82  Resp:  [19-23] 19  SpO2:  [95 %-100 %] 98 %  BP: (139-174)/(66-89) 160/77     Weight: 85.3 kg (188 lb 0.8 oz)  Body mass index is 26.98 kg/m².     SpO2: 98 %  O2 Device (Oxygen Therapy): room air      Intake/Output Summary (Last 24 hours) at 10/9/2019 1012  Last data filed at 10/9/2019 0635  Gross per 24 hour   Intake 310 ml   Output 1501 ml   Net -1191 ml       Lines/Drains/Airways     Peripheral Intravenous Line                 Peripheral IV - Single Lumen 10/09/19 0543 20 G Anterior;Right Upper Arm less than 1 day                Physical Exam   Constitutional: He is oriented to person, place, and time. He appears well-developed and well-nourished. No distress.   HENT:   Head: Normocephalic and atraumatic.   Eyes: Right eye exhibits no discharge. Left eye exhibits no discharge.   Neck: No JVD present.   Cardiovascular: Normal rate. An irregularly irregular rhythm present.   Murmur heard.  Pulmonary/Chest: Effort normal. He has no wheezes. He has no rales.   Abdominal: Soft. Bowel sounds are normal.   Musculoskeletal: He exhibits no edema.   Neurological: He is alert and oriented to person, place, and time.   Skin: Skin is warm and dry. He is not diaphoretic.    Psychiatric: He has a normal mood and affect. His behavior is normal. Judgment and thought content normal.       Significant Labs:   BMP:   Recent Labs   Lab 10/08/19  0958 10/09/19  0651   GLU 93  93 92     142 143   K 3.2*  3.2* 2.9*     110 110   CO2 21*  21* 22*   BUN 16  16 17   CREATININE 1.2  1.2 1.2   CALCIUM 8.4*  8.4* 8.5*   , CMP   Recent Labs   Lab 10/08/19  0958 10/09/19  0651     142 143   K 3.2*  3.2* 2.9*     110 110   CO2 21*  21* 22*   GLU 93  93 92   BUN 16  16 17   CREATININE 1.2  1.2 1.2   CALCIUM 8.4*  8.4* 8.5*   PROT 5.4* 5.6*   ALBUMIN 2.1* 2.1*   BILITOT 0.4 0.4   ALKPHOS 77 81   AST 31 29   ALT 18 19   ANIONGAP 11  11 11   ESTGFRAFRICA >60  >60 >60   EGFRNONAA >60  >60 >60   , CBC   Recent Labs   Lab 10/08/19  0958   WBC 6.99   HGB 9.5*   HCT 28.4*      , INR No results for input(s): INR, PROTIME in the last 48 hours., Lipid Panel No results for input(s): CHOL, HDL, LDLCALC, TRIG, CHOLHDL in the last 48 hours., Troponin   No results for input(s): TROPONINI in the last 48 hours. and All pertinent lab results from the last 24 hours have been reviewed.    Significant Imaging: Echocardiogram:   2D echo with color flow doppler: No results found for this or any previous visit. and Transthoracic echo (TTE) complete (Cupid Only):   Results for orders placed or performed during the hospital encounter of 10/04/19   Echo Color Flow Doppler? Yes   Result Value Ref Range    BSA 2.09 m2    LA WIDTH 4.10 cm    AORTIC VALVE CUSP SEPERATION 2.19 cm    PV PEAK VELOCITY 1.10 cm/s    LVIDD 4.60 3.5 - 6.0 cm    IVS 0.90 (A) 0.6 - 1.1 cm    PW 0.90 0.6 - 1.1 cm    Ao root annulus 3.54 cm    LVIDS 1.80 2.1 - 4.0 cm    FS 61 28 - 44 %    LV mass 137.72 g    LA size 2.43 cm    RVDD 2.90 cm    Left Ventricle Relative Wall Thickness 0.39 cm    AV mean gradient 3 mmHg    AV valve area 2.79 cm2    AV Velocity Ratio 0.88     AV index (prosthetic) 0.78     E/A ratio  2.20     E wave decelartion time 220.02 msec    IVRT 0.06 msec    Pulm vein S/D ratio 1.28     LVOT diameter 2.14 cm    LVOT area 3.6 cm2    LVOT peak jakob 0.98 m/s    LVOT peak VTI 18.97 cm    Ao peak jakob 1.12 m/s    Ao VTI 24.41 cm    LVOT stroke volume 68.20 cm3    AV peak gradient 5 mmHg    MV Peak E Jakob 1.08 m/s    TR Max Jakob 2.55 m/s    MV Peak A Jakob 0.49 m/s    PV Peak S Jakob 0.55 m/s    PV Peak D Jakob 0.43 m/s    LV Systolic Volume 54.88 mL    LV Systolic Volume Index 26.6 mL/m2    LV Diastolic Volume 95.75 mL    LV Diastolic Volume Index 46.37 mL/m2    LV Mass Index 67 g/m2    RA Major Axis 3.90 cm    Left Atrium Major Axis 5.30 cm    Triscuspid Valve Regurgitation Peak Gradient 26 mmHg    Right Atrial Pressure (from IVC) 3 mmHg    LA volume 42.66 cm3    TV rest pulmonary artery pressure 29 mmHg    LA Volume Index 20.7 mL/m2    Left Atrium Minor Axis 4.80 cm    RA Width 3.20 cm    Narrative    · Normal left ventricular systolic function. The estimated ejection   fraction is 60%  · Normal LV diastolic function.  · Mild mitral regurgitation.  · The estimated PA systolic pressure is 29 mm Hg  · Normal central venous pressure (3 mm Hg).

## 2019-10-09 NOTE — PROGRESS NOTES
[facility]  Cardiology  Progress Note    Patient Name: Domingo Gross Sr.  MRN: [id number]  Admission Date: 10/4/2019  Hospital Length of Stay: 5 days  Code Status: No Order   Attending Physician: Keo Jenkins MD   Primary Care Physician: Analy Encarnacion MD  Expected Discharge Date:   Principal Problem:Cardiac arrest    Subjective:     Hospital Course:   10/07/2019 No chest pain. Troponin peaked at 27 and is trending down at this time. SBP 160s-180s Bidil initiated. Remains on heparin gtt. 4L intake, 1.2 L output and net +6L from admission. Cr improved to 1.2. K+ 3- replaced. NPO p MN for potential LHC in AM. TTE pending.   10/08/2019   S/p LHC via R radial                LM patent              LAD patent              Ostial LCX 99%              Patent RCA stents with distal 60% stenosis                 LVEDP 12 mmHg              No LV gram because of recent recovery from ARF   10/09/2019 Hemodynamically stable overnight without chest pain. SR/ST on telemetry. Amiodarone initiated. K+2.9- replaced. Cr stable 1.2.         Review of Systems   Constitution: Negative.   Cardiovascular: Negative for chest pain, dyspnea on exertion, irregular heartbeat, leg swelling, near-syncope, orthopnea, palpitations, paroxysmal nocturnal dyspnea and syncope.   Respiratory: Negative for cough and shortness of breath.    Gastrointestinal: Negative for abdominal pain and vomiting.   Neurological: Negative for dizziness, focal weakness, light-headedness and weakness.     Objective:     Vital Signs (Most Recent):  Temp: 97 °F (36.1 °C) (10/09/19 0816)  Pulse: 82 (10/09/19 0816)  Resp: 19 (10/09/19 0816)  BP: (!) 160/77(Nurse Notified) (10/09/19 0816)  SpO2: 98 % (10/09/19 0816) Vital Signs (24h Range):  Temp:  [97 °F (36.1 °C)-99.5 °F (37.5 °C)] 97 °F (36.1 °C)  Pulse:  [68-95] 82  Resp:  [19-23] 19  SpO2:  [95 %-100 %] 98 %  BP: (139-174)/(66-89) 160/77     Weight: 85.3 kg (188 lb 0.8 oz)  Body mass index is  26.98 kg/m².     SpO2: 98 %  O2 Device (Oxygen Therapy): room air      Intake/Output Summary (Last 24 hours) at 10/9/2019 1012  Last data filed at 10/9/2019 0635  Gross per 24 hour   Intake 310 ml   Output 1501 ml   Net -1191 ml       Lines/Drains/Airways     Peripheral Intravenous Line                 Peripheral IV - Single Lumen 10/09/19 0543 20 G Anterior;Right Upper Arm less than 1 day                Physical Exam   Constitutional: He is oriented to person, place, and time. He appears well-developed and well-nourished. No distress.   HENT:   Head: Normocephalic and atraumatic.   Eyes: Right eye exhibits no discharge. Left eye exhibits no discharge.   Neck: No JVD present.   Cardiovascular: Normal rate. An irregularly irregular rhythm present.   Murmur heard.  Pulmonary/Chest: Effort normal. He has no wheezes. He has no rales.   Abdominal: Soft. Bowel sounds are normal.   Musculoskeletal: He exhibits no edema.   Neurological: He is alert and oriented to person, place, and time.   Skin: Skin is warm and dry. He is not diaphoretic.   Psychiatric: He has a normal mood and affect. His behavior is normal. Judgment and thought content normal.       Significant Labs:   BMP:   Recent Labs   Lab 10/08/19  0958 10/09/19  0651   GLU 93  93 92     142 143   K 3.2*  3.2* 2.9*     110 110   CO2 21*  21* 22*   BUN 16  16 17   CREATININE 1.2  1.2 1.2   CALCIUM 8.4*  8.4* 8.5*   , CMP   Recent Labs   Lab 10/08/19  0958 10/09/19  0651     142 143   K 3.2*  3.2* 2.9*     110 110   CO2 21*  21* 22*   GLU 93  93 92   BUN 16  16 17   CREATININE 1.2  1.2 1.2   CALCIUM 8.4*  8.4* 8.5*   PROT 5.4* 5.6*   ALBUMIN 2.1* 2.1*   BILITOT 0.4 0.4   ALKPHOS 77 81   AST 31 29   ALT 18 19   ANIONGAP 11  11 11   ESTGFRAFRICA >60  >60 >60   EGFRNONAA >60  >60 >60   , CBC   Recent Labs   Lab 10/08/19  0958   WBC 6.99   HGB 9.5*   HCT 28.4*      , INR No results for input(s): INR, PROTIME in the last  48 hours., Lipid Panel No results for input(s): CHOL, HDL, LDLCALC, TRIG, CHOLHDL in the last 48 hours., Troponin   No results for input(s): TROPONINI in the last 48 hours. and All pertinent lab results from the last 24 hours have been reviewed.    Significant Imaging: Echocardiogram:   2D echo with color flow doppler: No results found for this or any previous visit. and Transthoracic echo (TTE) complete (Cupid Only):   Results for orders placed or performed during the hospital encounter of 10/04/19   Echo Color Flow Doppler? Yes   Result Value Ref Range    BSA 2.09 m2    LA WIDTH 4.10 cm    AORTIC VALVE CUSP SEPERATION 2.19 cm    PV PEAK VELOCITY 1.10 cm/s    LVIDD 4.60 3.5 - 6.0 cm    IVS 0.90 (A) 0.6 - 1.1 cm    PW 0.90 0.6 - 1.1 cm    Ao root annulus 3.54 cm    LVIDS 1.80 2.1 - 4.0 cm    FS 61 28 - 44 %    LV mass 137.72 g    LA size 2.43 cm    RVDD 2.90 cm    Left Ventricle Relative Wall Thickness 0.39 cm    AV mean gradient 3 mmHg    AV valve area 2.79 cm2    AV Velocity Ratio 0.88     AV index (prosthetic) 0.78     E/A ratio 2.20     E wave decelartion time 220.02 msec    IVRT 0.06 msec    Pulm vein S/D ratio 1.28     LVOT diameter 2.14 cm    LVOT area 3.6 cm2    LVOT peak jakob 0.98 m/s    LVOT peak VTI 18.97 cm    Ao peak jakob 1.12 m/s    Ao VTI 24.41 cm    LVOT stroke volume 68.20 cm3    AV peak gradient 5 mmHg    MV Peak E Jakob 1.08 m/s    TR Max Jakob 2.55 m/s    MV Peak A Jakob 0.49 m/s    PV Peak S Jakob 0.55 m/s    PV Peak D Jakob 0.43 m/s    LV Systolic Volume 54.88 mL    LV Systolic Volume Index 26.6 mL/m2    LV Diastolic Volume 95.75 mL    LV Diastolic Volume Index 46.37 mL/m2    LV Mass Index 67 g/m2    RA Major Axis 3.90 cm    Left Atrium Major Axis 5.30 cm    Triscuspid Valve Regurgitation Peak Gradient 26 mmHg    Right Atrial Pressure (from IVC) 3 mmHg    LA volume 42.66 cm3    TV rest pulmonary artery pressure 29 mmHg    LA Volume Index 20.7 mL/m2    Left Atrium Minor Axis 4.80 cm    RA Width 3.20 cm     Narrative    · Normal left ventricular systolic function. The estimated ejection   fraction is 60%  · Normal LV diastolic function.  · Mild mitral regurgitation.  · The estimated PA systolic pressure is 29 mm Hg  · Normal central venous pressure (3 mm Hg).        Assessment and Plan:     Brief HPI: Patient seen this morning on rounds without CV complaint.     * Cardiac arrest  Per CAD    Acute renal failure  Presented with Cr 2.6 (baseline 1.1-1.2) multifactorial etiology given cardiac arrest, working in heat  Improved - 1.2 with IVF administration       Hypokalemia  Replace with goal >4    Atrial fibrillation with RVR  Presently SR/ST  Continue BB therapy, initiated Amiodarone     Will need OAC upon DC    Coronary artery disease involving native coronary artery of native heart with angina pectoris with documented spasm  03/2019  · S/p LHC via right radial  · LM, LAD, and LCX are patent with luminal irregularities  · RCA mid 95% calcified lesion  · Normal EF with LVEDP 8 mmHg  · PCI of mid LAD with 2.5 x 30 and 2.5 x 15 mm Resolute REZA  · PCI of proximal RCA to treat guide dissection with 3.5 x 22 Resolute REZA    Presented as witnessed cardiac arrest at an event. He spent the a good part of the day outdoor working on a fence with Temp  F. Per EMS report no pulse on arrival then CPR then regained ROSC after one shock. ECG from EMS and on arrival at [facility] revealed afib with RVR + inferior lateral leads ST depression. He denied any palpitations or chest pain.       Initially decided to take him to the cath lab for angiography but reviewing his labs we cancelled the case. Cr 2.6 on admission and now down to 1.2 with IVFs.   Proceeded with C on 10/08/2019 new Lcx lesion discovered. Staged PCI planned later this week to give kidneys time to recover   Continue asa, plavix, statin, BB.  No ACEI/ARB given renal fx      PAD (peripheral artery disease)  Continue medical management with asa, statin, Plavix  Stable      HTN (hypertension)  SBP 160s   Continue Toprol, Bidil added, up titrated Norvasc   Holding ACEI/ARB 2/2 BRETT         VTE Risk Mitigation (From admission, onward)         Ordered     enoxaparin injection 40 mg  Daily      10/09/19 0805     heparin infusion 1,000 units/500 ml in 0.9% NaCl (pressure line flush)  Intra-op continuous PRN      10/08/19 0836                Karlos Zimmerman NP  Cardiology  [facility]

## 2019-10-09 NOTE — PROGRESS NOTES
Pharmacist Renal Dose Adjustment Note    Domingo Gross . is a 59 y.o. male being treated with the medication enoxaparin.     Patient Data:    Vital Signs (Most Recent):  Temp: 98.6 °F (37 °C) (10/09/19 0432)  Pulse: 92 (10/09/19 0432)  Resp: 20 (10/09/19 0432)  BP: (!) 174/81 (10/09/19 0432)  SpO2: 98 % (10/09/19 0724)   Vital Signs (72h Range):  Temp:  [96.7 °F (35.9 °C)-99.5 °F (37.5 °C)]   Pulse:  [64-99]   Resp:  [18-42]   BP: (132-191)/(63-96)   SpO2:  [92 %-100 %]      Recent Labs   Lab 10/07/19  0349 10/08/19  0958 10/09/19  0651   CREATININE 1.2 1.2  1.2 1.2     Serum creatinine: 1.2 mg/dL 10/09/19 0651  Estimated creatinine clearance: 69.3 mL/min    Medication:Enoxaparin dose: 30mg frequency every 24 hours will be changed to medication: enoxaparin dose:40mg frequency:every 24 hours.     Pharmacist's Name: Hannah Slater  Pharmacist's Extension: [phone]

## 2019-10-10 LAB
ALBUMIN SERPL BCP-MCNC: 1.9 G/DL (ref 3.5–5.2)
ALP SERPL-CCNC: 76 U/L (ref 55–135)
ALT SERPL W/O P-5'-P-CCNC: 21 U/L (ref 10–44)
ANION GAP SERPL CALC-SCNC: 8 MMOL/L (ref 8–16)
APTT BLDCRRT: 28.4 SEC (ref 21–32)
AST SERPL-CCNC: 29 U/L (ref 10–40)
BILIRUB SERPL-MCNC: 0.3 MG/DL (ref 0.1–1)
BUN SERPL-MCNC: 17 MG/DL (ref 6–20)
CALCIUM SERPL-MCNC: 8.5 MG/DL (ref 8.7–10.5)
CHLORIDE SERPL-SCNC: 114 MMOL/L (ref 95–110)
CO2 SERPL-SCNC: 21 MMOL/L (ref 23–29)
CREAT SERPL-MCNC: 1.2 MG/DL (ref 0.5–1.4)
EST. GFR  (AFRICAN AMERICAN): >60 ML/MIN/1.73 M^2
EST. GFR  (NON AFRICAN AMERICAN): >60 ML/MIN/1.73 M^2
GLUCOSE SERPL-MCNC: 100 MG/DL (ref 70–110)
POTASSIUM SERPL-SCNC: 3.6 MMOL/L (ref 3.5–5.1)
PROT SERPL-MCNC: 5.1 G/DL (ref 6–8.4)
SODIUM SERPL-SCNC: 143 MMOL/L (ref 136–145)

## 2019-10-10 PROCEDURE — 85730 THROMBOPLASTIN TIME PARTIAL: CPT

## 2019-10-10 PROCEDURE — 25000003 PHARM REV CODE 250: Performed by: INTERNAL MEDICINE

## 2019-10-10 PROCEDURE — 99233 SBSQ HOSP IP/OBS HIGH 50: CPT | Mod: ,,, | Performed by: INTERNAL MEDICINE

## 2019-10-10 PROCEDURE — 94761 N-INVAS EAR/PLS OXIMETRY MLT: CPT

## 2019-10-10 PROCEDURE — 80053 COMPREHEN METABOLIC PANEL: CPT

## 2019-10-10 PROCEDURE — 25000003 PHARM REV CODE 250: Performed by: NURSE PRACTITIONER

## 2019-10-10 PROCEDURE — 99233 PR SUBSEQUENT HOSPITAL CARE,LEVL III: ICD-10-PCS | Mod: ,,, | Performed by: INTERNAL MEDICINE

## 2019-10-10 PROCEDURE — 11000001 HC ACUTE MED/SURG PRIVATE ROOM

## 2019-10-10 PROCEDURE — 63600175 PHARM REV CODE 636 W HCPCS: Performed by: NURSE PRACTITIONER

## 2019-10-10 RX ORDER — POTASSIUM CHLORIDE 20 MEQ/1
40 TABLET, EXTENDED RELEASE ORAL ONCE
Status: COMPLETED | OUTPATIENT
Start: 2019-10-10 | End: 2019-10-10

## 2019-10-10 RX ORDER — HYDRALAZINE HYDROCHLORIDE 25 MG/1
50 TABLET, FILM COATED ORAL EVERY 8 HOURS
Status: DISCONTINUED | OUTPATIENT
Start: 2019-10-10 | End: 2019-10-12 | Stop reason: HOSPADM

## 2019-10-10 RX ADMIN — ISOSORBIDE DINITRATE 20 MG: 10 TABLET ORAL at 09:10

## 2019-10-10 RX ADMIN — CLOPIDOGREL BISULFATE 75 MG: 75 TABLET ORAL at 09:10

## 2019-10-10 RX ADMIN — Medication 81 MG: at 09:10

## 2019-10-10 RX ADMIN — ISOSORBIDE DINITRATE 20 MG: 10 TABLET ORAL at 04:10

## 2019-10-10 RX ADMIN — ENOXAPARIN SODIUM 40 MG: 100 INJECTION SUBCUTANEOUS at 04:10

## 2019-10-10 RX ADMIN — HYDRALAZINE HYDROCHLORIDE 25 MG: 25 TABLET, FILM COATED ORAL at 06:10

## 2019-10-10 RX ADMIN — AMIODARONE HYDROCHLORIDE 400 MG: 200 TABLET ORAL at 09:10

## 2019-10-10 RX ADMIN — AMLODIPINE BESYLATE 10 MG: 5 TABLET ORAL at 09:10

## 2019-10-10 RX ADMIN — ATORVASTATIN CALCIUM 40 MG: 40 TABLET, FILM COATED ORAL at 09:10

## 2019-10-10 RX ADMIN — CILOSTAZOL 100 MG: 50 TABLET ORAL at 09:10

## 2019-10-10 RX ADMIN — HYDRALAZINE HYDROCHLORIDE 50 MG: 25 TABLET, FILM COATED ORAL at 09:10

## 2019-10-10 RX ADMIN — METOPROLOL SUCCINATE 100 MG: 50 TABLET, EXTENDED RELEASE ORAL at 09:10

## 2019-10-10 RX ADMIN — POTASSIUM CHLORIDE 40 MEQ: 1500 TABLET, EXTENDED RELEASE ORAL at 01:10

## 2019-10-10 RX ADMIN — HYDRALAZINE HYDROCHLORIDE 50 MG: 25 TABLET, FILM COATED ORAL at 01:10

## 2019-10-10 NOTE — ASSESSMENT & PLAN NOTE
03/2019  · S/p LHC via right radial  · LM, LAD, and LCX are patent with luminal irregularities  · RCA mid 95% calcified lesion  · Normal EF with LVEDP 8 mmHg  · PCI of mid LAD with 2.5 x 30 and 2.5 x 15 mm Resolute REZA  · PCI of proximal RCA to treat guide dissection with 3.5 x 22 Resolute REZA    Presented as witnessed cardiac arrest at an event. He spent the a good part of the day outdoor working on a fence with Temp  F. Per EMS report no pulse on arrival then CPR then regained ROSC after one shock. ECG from EMS and on arrival at [facility] revealed afib with RVR + inferior lateral leads ST depression.      Initially decided to take him to the cath lab for angiography but reviewing his labs we cancelled the case.   Cr 2.6 on admission and now down to 1.2 ~ baseline  Proceeded with [facility] on 10/08/2019 new Lcx lesion discovered. Staged PCI 10/11/2019  Continue asa, plavix, statin, BB.  No ACEI/ARB given renal fx

## 2019-10-10 NOTE — PLAN OF CARE
Visit with pt ambulating in room.  Aware of plan for d/c home tomorrow after procedure.  Anticipates no needs at d/c with exception of f/u's.    Anabelle Cerna RN    [phone]

## 2019-10-10 NOTE — PLAN OF CARE
Safety measures in progress, bed in lowest position, wheels locked x4, siderails up x2, bed alarm set and audible, call light within reach.  Reviewed plan of care with pt., understanding verbalized.  Will monitor pt. Throughout shift.

## 2019-10-10 NOTE — PLAN OF CARE
Pt AAOx4, VSS, NAD. No complaints of pain, headache or N/V. Iv mag infusing per MAR. Tolerating diet. Procedure site clean dry and intact. No other needs at this time. Safety maintained. Will continue to monitor.

## 2019-10-10 NOTE — PROGRESS NOTES
[facility]  Cardiology  Progress Note    Patient Name: Domingo Gross Sr.  MRN: [id number]  Admission Date: 10/4/2019  Hospital Length of Stay: 6 days  Code Status: No Order   Attending Physician: Keo Jenkins MD   Primary Care Physician: Analy Encarnacion MD  Expected Discharge Date:   Principal Problem:Cardiac arrest    Subjective:     Hospital Course:   10/07/2019 No chest pain. Troponin peaked at 27 and is trending down at this time. SBP 160s-180s Bidil initiated. Remains on heparin gtt. 4L intake, 1.2 L output and net +6L from admission. Cr improved to 1.2. K+ 3- replaced. NPO p MN for potential LHC in AM. TTE pending.   10/08/2019   S/p LHC via R radial                LM patent              LAD patent              Ostial LCX 99%              Patent RCA stents with distal 60% stenosis                 LVEDP 12 mmHg              No LV gram because of recent recovery from ARF   10/09/2019 Hemodynamically stable overnight without chest pain. SR/ST on telemetry. Amiodarone initiated. K+2.9- replaced. Cr stable 1.2.   10/10/2019 No chest pain overnight. Remains in SR. K+ 3.6 replacement given. Hemodynamically stable. NPO p MN for LHC in AM.         Review of Systems   Constitution: Negative.   Cardiovascular: Negative for chest pain, dyspnea on exertion, irregular heartbeat, leg swelling, near-syncope, orthopnea, palpitations, paroxysmal nocturnal dyspnea and syncope.   Respiratory: Negative for cough and shortness of breath.    Gastrointestinal: Negative for abdominal pain and vomiting.   Neurological: Negative for dizziness, focal weakness, light-headedness and weakness.     Objective:     Vital Signs (Most Recent):  Temp: 97.3 °F (36.3 °C) (10/10/19 1017)  Pulse: 87 (10/10/19 1150)  Resp: 20 (10/10/19 1017)  BP: 128/86 (10/10/19 1017)  SpO2: 100 % (10/10/19 1129) Vital Signs (24h Range):  Temp:  [96.3 °F (35.7 °C)-97.5 °F (36.4 °C)] 97.3 °F (36.3 °C)  Pulse:  [72-87] 87  Resp:  [18-20]  20  SpO2:  [95 %-100 %] 100 %  BP: (128-165)/(65-86) 128/86     Weight: 85.3 kg (188 lb 0.8 oz)  Body mass index is 26.98 kg/m².     SpO2: 100 %  O2 Device (Oxygen Therapy): room air      Intake/Output Summary (Last 24 hours) at 10/10/2019 1213  Last data filed at 10/10/2019 0900  Gross per 24 hour   Intake 680 ml   Output 1150 ml   Net -470 ml       Lines/Drains/Airways     Peripheral Intravenous Line                 Peripheral IV - Single Lumen 10/09/19 0543 20 G Anterior;Right Upper Arm 1 day                Physical Exam   Constitutional: He is oriented to person, place, and time. He appears well-developed and well-nourished. No distress.   HENT:   Head: Normocephalic and atraumatic.   Eyes: Right eye exhibits no discharge. Left eye exhibits no discharge.   Neck: No JVD present.   Cardiovascular: Normal rate. An irregularly irregular rhythm present.   Murmur heard.  Pulmonary/Chest: Effort normal. He has no wheezes. He has no rales.   Abdominal: Soft. Bowel sounds are normal.   Musculoskeletal: He exhibits no edema.   Neurological: He is alert and oriented to person, place, and time.   Skin: Skin is warm and dry. He is not diaphoretic.   Psychiatric: He has a normal mood and affect. His behavior is normal. Judgment and thought content normal.       Significant Labs:   BMP:   Recent Labs   Lab 10/09/19  0651 10/09/19  1336 10/10/19  0639   GLU 92  --  100     --  143   K 2.9* 3.0* 3.6     --  114*   CO2 22*  --  21*   BUN 17  --  17   CREATININE 1.2  --  1.2   CALCIUM 8.5*  --  8.5*   MG  --  1.3*  --    , CMP   Recent Labs   Lab 10/09/19  0651 10/09/19  1336 10/10/19  0639     --  143   K 2.9* 3.0* 3.6     --  114*   CO2 22*  --  21*   GLU 92  --  100   BUN 17  --  17   CREATININE 1.2  --  1.2   CALCIUM 8.5*  --  8.5*   PROT 5.6*  --  5.1*   ALBUMIN 2.1*  --  1.9*   BILITOT 0.4  --  0.3   ALKPHOS 81  --  76   AST 29  --  29   ALT 19  --  21   ANIONGAP 11  --  8   ESTGFRAFRICA >60  --  >60    EGFRNONAA >60  --  >60   , CBC   No results for input(s): WBC, HGB, HCT, PLT in the last 48 hours., INR No results for input(s): INR, PROTIME in the last 48 hours., Lipid Panel No results for input(s): CHOL, HDL, LDLCALC, TRIG, CHOLHDL in the last 48 hours., Troponin   No results for input(s): TROPONINI in the last 48 hours. and All pertinent lab results from the last 24 hours have been reviewed.    Significant Imaging: Echocardiogram:   2D echo with color flow doppler: No results found for this or any previous visit. and Transthoracic echo (TTE) complete (Cupid Only):   Results for orders placed or performed during the hospital encounter of 10/04/19   Echo Color Flow Doppler? Yes   Result Value Ref Range    BSA 2.09 m2    LA WIDTH 4.10 cm    AORTIC VALVE CUSP SEPERATION 2.19 cm    PV PEAK VELOCITY 1.10 cm/s    LVIDD 4.60 3.5 - 6.0 cm    IVS 0.90 (A) 0.6 - 1.1 cm    PW 0.90 0.6 - 1.1 cm    Ao root annulus 3.54 cm    LVIDS 1.80 2.1 - 4.0 cm    FS 61 28 - 44 %    LV mass 137.72 g    LA size 2.43 cm    RVDD 2.90 cm    Left Ventricle Relative Wall Thickness 0.39 cm    AV mean gradient 3 mmHg    AV valve area 2.79 cm2    AV Velocity Ratio 0.88     AV index (prosthetic) 0.78     E/A ratio 2.20     E wave decelartion time 220.02 msec    IVRT 0.06 msec    Pulm vein S/D ratio 1.28     LVOT diameter 2.14 cm    LVOT area 3.6 cm2    LVOT peak jakob 0.98 m/s    LVOT peak VTI 18.97 cm    Ao peak jakob 1.12 m/s    Ao VTI 24.41 cm    LVOT stroke volume 68.20 cm3    AV peak gradient 5 mmHg    MV Peak E Jakob 1.08 m/s    TR Max Jakob 2.55 m/s    MV Peak A Jakob 0.49 m/s    PV Peak S Jakob 0.55 m/s    PV Peak D Jakob 0.43 m/s    LV Systolic Volume 54.88 mL    LV Systolic Volume Index 26.6 mL/m2    LV Diastolic Volume 95.75 mL    LV Diastolic Volume Index 46.37 mL/m2    LV Mass Index 67 g/m2    RA Major Axis 3.90 cm    Left Atrium Major Axis 5.30 cm    Triscuspid Valve Regurgitation Peak Gradient 26 mmHg    Right Atrial Pressure (from IVC) 3 mmHg     LA volume 42.66 cm3    TV rest pulmonary artery pressure 29 mmHg    LA Volume Index 20.7 mL/m2    Left Atrium Minor Axis 4.80 cm    RA Width 3.20 cm    Narrative    · Normal left ventricular systolic function. The estimated ejection   fraction is 60%  · Normal LV diastolic function.  · Mild mitral regurgitation.  · The estimated PA systolic pressure is 29 mm Hg  · Normal central venous pressure (3 mm Hg).        Assessment and Plan:     Brief HPI: Patient seen this morning on rounds without CV complaint. POC discussed for [facility] Friday.     * Cardiac arrest  Per CAD    Acute renal failure  Presented with Cr 2.6 (baseline 1.1-1.2) multifactorial etiology given cardiac arrest, working in heat  Improved - 1.2 with IVF administration       Hypokalemia  Replace with goal >4, 3.6 today.   Mg 1.3 10/09- 2gm rider given     Atrial fibrillation with RVR  Presently SR/ST  Continue BB therapy, initiated Amiodarone     Will need OAC upon DC    Coronary artery disease involving native coronary artery of native heart with angina pectoris with documented spasm  03/2019  · S/p LHC via right radial  · LM, LAD, and LCX are patent with luminal irregularities  · RCA mid 95% calcified lesion  · Normal EF with LVEDP 8 mmHg  · PCI of mid LAD with 2.5 x 30 and 2.5 x 15 mm Resolute REZA  · PCI of proximal RCA to treat guide dissection with 3.5 x 22 Resolute REZA    Presented as witnessed cardiac arrest at an event. He spent the a good part of the day outdoor working on a fence with Temp  F. Per EMS report no pulse on arrival then CPR then regained ROSC after one shock. ECG from EMS and on arrival at [facility] revealed afib with RVR + inferior lateral leads ST depression.      Initially decided to take him to the cath lab for angiography but reviewing his labs we cancelled the case.   Cr 2.6 on admission and now down to 1.2 ~ baseline  Proceeded with [facility] on 10/08/2019 new Lcx lesion discovered. Staged PCI 10/11/2019  Continue asa,  plavix, statin, BB.  No ACEI/ARB given renal fx      PAD (peripheral artery disease)  Continue medical management with asa, statin, Plavix, Pletal  Stable     HTN (hypertension)  SBP 140s   Continue Toprol, Bidil added, up titrated Norvasc   Holding ACEI/ARB 2/2 BRETT         VTE Risk Mitigation (From admission, onward)         Ordered     enoxaparin injection 40 mg  Daily      10/09/19 0805     heparin infusion 1,000 units/500 ml in 0.9% NaCl (pressure line flush)  Intra-op continuous PRN      10/08/19 0836                Karlos Zimmerman NP  Cardiology  [facility]

## 2019-10-10 NOTE — SUBJECTIVE & OBJECTIVE
Review of Systems   Constitution: Negative.   Cardiovascular: Negative for chest pain, dyspnea on exertion, irregular heartbeat, leg swelling, near-syncope, orthopnea, palpitations, paroxysmal nocturnal dyspnea and syncope.   Respiratory: Negative for cough and shortness of breath.    Gastrointestinal: Negative for abdominal pain and vomiting.   Neurological: Negative for dizziness, focal weakness, light-headedness and weakness.     Objective:     Vital Signs (Most Recent):  Temp: 97.3 °F (36.3 °C) (10/10/19 1017)  Pulse: 87 (10/10/19 1150)  Resp: 20 (10/10/19 1017)  BP: 128/86 (10/10/19 1017)  SpO2: 100 % (10/10/19 1129) Vital Signs (24h Range):  Temp:  [96.3 °F (35.7 °C)-97.5 °F (36.4 °C)] 97.3 °F (36.3 °C)  Pulse:  [72-87] 87  Resp:  [18-20] 20  SpO2:  [95 %-100 %] 100 %  BP: (128-165)/(65-86) 128/86     Weight: 85.3 kg (188 lb 0.8 oz)  Body mass index is 26.98 kg/m².     SpO2: 100 %  O2 Device (Oxygen Therapy): room air      Intake/Output Summary (Last 24 hours) at 10/10/2019 1213  Last data filed at 10/10/2019 0900  Gross per 24 hour   Intake 680 ml   Output 1150 ml   Net -470 ml       Lines/Drains/Airways     Peripheral Intravenous Line                 Peripheral IV - Single Lumen 10/09/19 0543 20 G Anterior;Right Upper Arm 1 day                Physical Exam   Constitutional: He is oriented to person, place, and time. He appears well-developed and well-nourished. No distress.   HENT:   Head: Normocephalic and atraumatic.   Eyes: Right eye exhibits no discharge. Left eye exhibits no discharge.   Neck: No JVD present.   Cardiovascular: Normal rate. An irregularly irregular rhythm present.   Murmur heard.  Pulmonary/Chest: Effort normal. He has no wheezes. He has no rales.   Abdominal: Soft. Bowel sounds are normal.   Musculoskeletal: He exhibits no edema.   Neurological: He is alert and oriented to person, place, and time.   Skin: Skin is warm and dry. He is not diaphoretic.   Psychiatric: He has a normal  mood and affect. His behavior is normal. Judgment and thought content normal.       Significant Labs:   BMP:   Recent Labs   Lab 10/09/19  0651 10/09/19  1336 10/10/19  0639   GLU 92  --  100     --  143   K 2.9* 3.0* 3.6     --  114*   CO2 22*  --  21*   BUN 17  --  17   CREATININE 1.2  --  1.2   CALCIUM 8.5*  --  8.5*   MG  --  1.3*  --    , CMP   Recent Labs   Lab 10/09/19  0651 10/09/19  1336 10/10/19  0639     --  143   K 2.9* 3.0* 3.6     --  114*   CO2 22*  --  21*   GLU 92  --  100   BUN 17  --  17   CREATININE 1.2  --  1.2   CALCIUM 8.5*  --  8.5*   PROT 5.6*  --  5.1*   ALBUMIN 2.1*  --  1.9*   BILITOT 0.4  --  0.3   ALKPHOS 81  --  76   AST 29  --  29   ALT 19  --  21   ANIONGAP 11  --  8   ESTGFRAFRICA >60  --  >60   EGFRNONAA >60  --  >60   , CBC   No results for input(s): WBC, HGB, HCT, PLT in the last 48 hours., INR No results for input(s): INR, PROTIME in the last 48 hours., Lipid Panel No results for input(s): CHOL, HDL, LDLCALC, TRIG, CHOLHDL in the last 48 hours., Troponin   No results for input(s): TROPONINI in the last 48 hours. and All pertinent lab results from the last 24 hours have been reviewed.    Significant Imaging: Echocardiogram:   2D echo with color flow doppler: No results found for this or any previous visit. and Transthoracic echo (TTE) complete (Cupid Only):   Results for orders placed or performed during the hospital encounter of 10/04/19   Echo Color Flow Doppler? Yes   Result Value Ref Range    BSA 2.09 m2    LA WIDTH 4.10 cm    AORTIC VALVE CUSP SEPERATION 2.19 cm    PV PEAK VELOCITY 1.10 cm/s    LVIDD 4.60 3.5 - 6.0 cm    IVS 0.90 (A) 0.6 - 1.1 cm    PW 0.90 0.6 - 1.1 cm    Ao root annulus 3.54 cm    LVIDS 1.80 2.1 - 4.0 cm    FS 61 28 - 44 %    LV mass 137.72 g    LA size 2.43 cm    RVDD 2.90 cm    Left Ventricle Relative Wall Thickness 0.39 cm    AV mean gradient 3 mmHg    AV valve area 2.79 cm2    AV Velocity Ratio 0.88     AV index (prosthetic)  0.78     E/A ratio 2.20     E wave decelartion time 220.02 msec    IVRT 0.06 msec    Pulm vein S/D ratio 1.28     LVOT diameter 2.14 cm    LVOT area 3.6 cm2    LVOT peak jakob 0.98 m/s    LVOT peak VTI 18.97 cm    Ao peak jakob 1.12 m/s    Ao VTI 24.41 cm    LVOT stroke volume 68.20 cm3    AV peak gradient 5 mmHg    MV Peak E Jakob 1.08 m/s    TR Max Jakob 2.55 m/s    MV Peak A Jakob 0.49 m/s    PV Peak S Jakob 0.55 m/s    PV Peak D Jakob 0.43 m/s    LV Systolic Volume 54.88 mL    LV Systolic Volume Index 26.6 mL/m2    LV Diastolic Volume 95.75 mL    LV Diastolic Volume Index 46.37 mL/m2    LV Mass Index 67 g/m2    RA Major Axis 3.90 cm    Left Atrium Major Axis 5.30 cm    Triscuspid Valve Regurgitation Peak Gradient 26 mmHg    Right Atrial Pressure (from IVC) 3 mmHg    LA volume 42.66 cm3    TV rest pulmonary artery pressure 29 mmHg    LA Volume Index 20.7 mL/m2    Left Atrium Minor Axis 4.80 cm    RA Width 3.20 cm    Narrative    · Normal left ventricular systolic function. The estimated ejection   fraction is 60%  · Normal LV diastolic function.  · Mild mitral regurgitation.  · The estimated PA systolic pressure is 29 mm Hg  · Normal central venous pressure (3 mm Hg).

## 2019-10-10 NOTE — PLAN OF CARE
Reviewed plan of care with pt., understanding verbalized.  Safety measures in progress, bed in lowest position, wheels locked x4, siderails up x2, call light within reach, bed alarm set and audible.  Will monitor pt. Throughout shift.

## 2019-10-10 NOTE — PLAN OF CARE
VN rounds: VN cued into pt's room with pt's permission. Pt ambulatory in room. Fall risk protocol discussed with pt. VN instructed pt to call for assistance. Pt aware and agreeable. NAD noted. Allowed time for questions.  Will cont to be available and intervene as needed..     10/10/19 9255   Type of Frequent Check   Type Patient Rounds;Other (see comments)  (vn round)   Safety/Activity   Patient Rounds visualized patient   Safety Promotion/Fall Prevention instructed to call staff for mobility;Fall Risk reviewed with patient/family   Positioning   Body Position other (see comments)  (ambulating in room)   Pain/Comfort/Sleep   Preferred Pain Scale word (verbal rating pain scale)   Pain Rating (0-10): Rest 0   Sleep/Rest/Relaxation awake;no problem identified   Assessments (Pre/Post)   Level of Consciousness (AVPU) alert

## 2019-10-10 NOTE — NURSING
Pt. With 9 beat run of V-tach, asymptomatic.  Pt. Appears to be asleep.  Will continue to monitor pt. Throughout shift, copy of strip in pt's chart.  Paged MD Jenkins.  Notified MD Jenkins.

## 2019-10-11 LAB
ALBUMIN SERPL BCP-MCNC: 2 G/DL (ref 3.5–5.2)
ALP SERPL-CCNC: 82 U/L (ref 55–135)
ALT SERPL W/O P-5'-P-CCNC: 22 U/L (ref 10–44)
ANION GAP SERPL CALC-SCNC: 9 MMOL/L (ref 8–16)
APTT BLDCRRT: 28.9 SEC (ref 21–32)
AST SERPL-CCNC: 24 U/L (ref 10–40)
BILIRUB SERPL-MCNC: 0.3 MG/DL (ref 0.1–1)
BUN SERPL-MCNC: 21 MG/DL (ref 6–20)
CALCIUM SERPL-MCNC: 8.4 MG/DL (ref 8.7–10.5)
CHLORIDE SERPL-SCNC: 112 MMOL/L (ref 95–110)
CO2 SERPL-SCNC: 21 MMOL/L (ref 23–29)
CREAT SERPL-MCNC: 1.4 MG/DL (ref 0.5–1.4)
EST. GFR  (AFRICAN AMERICAN): >60 ML/MIN/1.73 M^2
EST. GFR  (NON AFRICAN AMERICAN): 55 ML/MIN/1.73 M^2
GLUCOSE SERPL-MCNC: 98 MG/DL (ref 70–110)
POC ACTIVATED CLOTTING TIME K: 180 SEC (ref 74–137)
POC ACTIVATED CLOTTING TIME K: 191 SEC (ref 74–137)
POC ACTIVATED CLOTTING TIME K: 202 SEC (ref 74–137)
POTASSIUM SERPL-SCNC: 3.6 MMOL/L (ref 3.5–5.1)
PROT SERPL-MCNC: 5.3 G/DL (ref 6–8.4)
SAMPLE: ABNORMAL
SODIUM SERPL-SCNC: 142 MMOL/L (ref 136–145)

## 2019-10-11 PROCEDURE — 93010 ELECTROCARDIOGRAM REPORT: CPT | Mod: ,,, | Performed by: STUDENT IN AN ORGANIZED HEALTH CARE EDUCATION/TRAINING PROGRAM

## 2019-10-11 PROCEDURE — C1753 CATH, INTRAVAS ULTRASOUND: HCPCS | Performed by: INTERNAL MEDICINE

## 2019-10-11 PROCEDURE — 92928 PRQ TCAT PLMT NTRAC ST 1 LES: CPT | Mod: LC,,, | Performed by: INTERNAL MEDICINE

## 2019-10-11 PROCEDURE — 11000001 HC ACUTE MED/SURG PRIVATE ROOM

## 2019-10-11 PROCEDURE — 36415 COLL VENOUS BLD VENIPUNCTURE: CPT

## 2019-10-11 PROCEDURE — 92978 PR IVUS, CORONARY, 1ST VESSEL: ICD-10-PCS | Mod: 26,LC,, | Performed by: INTERNAL MEDICINE

## 2019-10-11 PROCEDURE — 99152 PR MOD CONSCIOUS SEDATION, SAME PHYS, 5+ YRS, FIRST 15 MIN: ICD-10-PCS | Mod: ,,, | Performed by: INTERNAL MEDICINE

## 2019-10-11 PROCEDURE — 63600175 PHARM REV CODE 636 W HCPCS: Performed by: NURSE PRACTITIONER

## 2019-10-11 PROCEDURE — 99152 MOD SED SAME PHYS/QHP 5/>YRS: CPT | Mod: ,,, | Performed by: INTERNAL MEDICINE

## 2019-10-11 PROCEDURE — 93005 ELECTROCARDIOGRAM TRACING: CPT

## 2019-10-11 PROCEDURE — C1769 GUIDE WIRE: HCPCS | Performed by: INTERNAL MEDICINE

## 2019-10-11 PROCEDURE — 85730 THROMBOPLASTIN TIME PARTIAL: CPT

## 2019-10-11 PROCEDURE — 93010 ELECTROCARDIOGRAM REPORT: CPT | Mod: 76,,, | Performed by: STUDENT IN AN ORGANIZED HEALTH CARE EDUCATION/TRAINING PROGRAM

## 2019-10-11 PROCEDURE — C9600 PERC DRUG-EL COR STENT SING: HCPCS | Mod: LC | Performed by: INTERNAL MEDICINE

## 2019-10-11 PROCEDURE — 27201423 OPTIME MED/SURG SUP & DEVICES STERILE SUPPLY: Performed by: INTERNAL MEDICINE

## 2019-10-11 PROCEDURE — C1874 STENT, COATED/COV W/DEL SYS: HCPCS | Performed by: INTERNAL MEDICINE

## 2019-10-11 PROCEDURE — 92978 ENDOLUMINL IVUS OCT C 1ST: CPT | Mod: LC | Performed by: INTERNAL MEDICINE

## 2019-10-11 PROCEDURE — 93010 EKG 12-LEAD: ICD-10-PCS | Mod: 76,,, | Performed by: STUDENT IN AN ORGANIZED HEALTH CARE EDUCATION/TRAINING PROGRAM

## 2019-10-11 PROCEDURE — 63600175 PHARM REV CODE 636 W HCPCS: Performed by: INTERNAL MEDICINE

## 2019-10-11 PROCEDURE — 25000003 PHARM REV CODE 250: Performed by: NURSE PRACTITIONER

## 2019-10-11 PROCEDURE — 80053 COMPREHEN METABOLIC PANEL: CPT

## 2019-10-11 PROCEDURE — C1725 CATH, TRANSLUMIN NON-LASER: HCPCS | Performed by: INTERNAL MEDICINE

## 2019-10-11 PROCEDURE — C1894 INTRO/SHEATH, NON-LASER: HCPCS | Performed by: INTERNAL MEDICINE

## 2019-10-11 PROCEDURE — C1887 CATHETER, GUIDING: HCPCS | Performed by: INTERNAL MEDICINE

## 2019-10-11 PROCEDURE — 85347 COAGULATION TIME ACTIVATED: CPT | Performed by: INTERNAL MEDICINE

## 2019-10-11 PROCEDURE — 25000003 PHARM REV CODE 250: Performed by: INTERNAL MEDICINE

## 2019-10-11 PROCEDURE — 92978 ENDOLUMINL IVUS OCT C 1ST: CPT | Mod: 26,LC,, | Performed by: INTERNAL MEDICINE

## 2019-10-11 PROCEDURE — 92928 PR STENT: ICD-10-PCS | Mod: LC,,, | Performed by: INTERNAL MEDICINE

## 2019-10-11 DEVICE — STENT RONYX30015UX RESOLUTE ONYX 3.00X15
Type: IMPLANTABLE DEVICE | Site: CORONARY | Status: FUNCTIONAL
Brand: RESOLUTE ONYX™

## 2019-10-11 RX ORDER — MIDAZOLAM HYDROCHLORIDE 1 MG/ML
INJECTION, SOLUTION INTRAMUSCULAR; INTRAVENOUS
Status: DISCONTINUED | OUTPATIENT
Start: 2019-10-11 | End: 2019-10-11 | Stop reason: HOSPADM

## 2019-10-11 RX ORDER — ACETAMINOPHEN 325 MG/1
650 TABLET ORAL EVERY 4 HOURS PRN
Status: DISCONTINUED | OUTPATIENT
Start: 2019-10-11 | End: 2019-10-12 | Stop reason: HOSPADM

## 2019-10-11 RX ORDER — DIPHENHYDRAMINE HYDROCHLORIDE 50 MG/ML
INJECTION INTRAMUSCULAR; INTRAVENOUS
Status: DISCONTINUED | OUTPATIENT
Start: 2019-10-11 | End: 2019-10-11 | Stop reason: HOSPADM

## 2019-10-11 RX ORDER — ONDANSETRON 2 MG/ML
4 INJECTION INTRAMUSCULAR; INTRAVENOUS EVERY 12 HOURS PRN
Status: DISCONTINUED | OUTPATIENT
Start: 2019-10-11 | End: 2019-10-12 | Stop reason: HOSPADM

## 2019-10-11 RX ORDER — VERAPAMIL HYDROCHLORIDE 2.5 MG/ML
INJECTION, SOLUTION INTRAVENOUS
Status: DISCONTINUED | OUTPATIENT
Start: 2019-10-11 | End: 2019-10-11 | Stop reason: HOSPADM

## 2019-10-11 RX ORDER — HYDROCODONE BITARTRATE AND ACETAMINOPHEN 5; 325 MG/1; MG/1
1 TABLET ORAL EVERY 4 HOURS PRN
Status: DISCONTINUED | OUTPATIENT
Start: 2019-10-11 | End: 2019-10-12 | Stop reason: HOSPADM

## 2019-10-11 RX ORDER — HEPARIN SODIUM 1000 [USP'U]/ML
INJECTION, SOLUTION INTRAVENOUS; SUBCUTANEOUS
Status: DISCONTINUED | OUTPATIENT
Start: 2019-10-11 | End: 2019-10-11 | Stop reason: HOSPADM

## 2019-10-11 RX ORDER — FENTANYL CITRATE 50 UG/ML
INJECTION, SOLUTION INTRAMUSCULAR; INTRAVENOUS
Status: DISCONTINUED | OUTPATIENT
Start: 2019-10-11 | End: 2019-10-11 | Stop reason: HOSPADM

## 2019-10-11 RX ORDER — LIDOCAINE HYDROCHLORIDE 10 MG/ML
INJECTION, SOLUTION EPIDURAL; INFILTRATION; INTRACAUDAL; PERINEURAL
Status: DISCONTINUED | OUTPATIENT
Start: 2019-10-11 | End: 2019-10-11 | Stop reason: HOSPADM

## 2019-10-11 RX ADMIN — AMIODARONE HYDROCHLORIDE 400 MG: 200 TABLET ORAL at 07:10

## 2019-10-11 RX ADMIN — AMLODIPINE BESYLATE 10 MG: 5 TABLET ORAL at 02:10

## 2019-10-11 RX ADMIN — ATORVASTATIN CALCIUM 40 MG: 40 TABLET, FILM COATED ORAL at 09:10

## 2019-10-11 RX ADMIN — ENOXAPARIN SODIUM 40 MG: 100 INJECTION SUBCUTANEOUS at 05:10

## 2019-10-11 RX ADMIN — ISOSORBIDE DINITRATE 20 MG: 10 TABLET ORAL at 02:10

## 2019-10-11 RX ADMIN — HYDRALAZINE HYDROCHLORIDE 50 MG: 25 TABLET, FILM COATED ORAL at 02:10

## 2019-10-11 RX ADMIN — AMIODARONE HYDROCHLORIDE 400 MG: 200 TABLET ORAL at 09:10

## 2019-10-11 RX ADMIN — HYDRALAZINE HYDROCHLORIDE 50 MG: 25 TABLET, FILM COATED ORAL at 05:10

## 2019-10-11 RX ADMIN — ISOSORBIDE DINITRATE 20 MG: 10 TABLET ORAL at 09:10

## 2019-10-11 RX ADMIN — CILOSTAZOL 100 MG: 50 TABLET ORAL at 09:10

## 2019-10-11 RX ADMIN — HYDRALAZINE HYDROCHLORIDE 50 MG: 25 TABLET, FILM COATED ORAL at 09:10

## 2019-10-11 RX ADMIN — SODIUM CHLORIDE 255.9 ML: 0.9 INJECTION, SOLUTION INTRAVENOUS at 09:10

## 2019-10-11 RX ADMIN — METOPROLOL SUCCINATE 100 MG: 50 TABLET, EXTENDED RELEASE ORAL at 07:10

## 2019-10-11 RX ADMIN — CLOPIDOGREL BISULFATE 75 MG: 75 TABLET ORAL at 07:10

## 2019-10-11 RX ADMIN — Medication 81 MG: at 07:10

## 2019-10-11 RX ADMIN — CILOSTAZOL 100 MG: 50 TABLET ORAL at 07:10

## 2019-10-11 NOTE — BRIEF OP NOTE
S/p staged LCX PCI       L CFA access   IVUS guided PCI   3.0 x 15 mm Resolute REZA post dilated with 3.5 NC balloon      He tolerated the procedure well       Full report to follow        DAPT with aspirin + plavix   Intense statin therapy    Beta-blocker   Amiodarone           DC in am   Event monitor

## 2019-10-11 NOTE — INTERVAL H&P NOTE
The patient has been examined and the H&P has been reviewed:        Anesthesia/Surgery risks, benefits and alternative options discussed and understood by patient/family.          Active Hospital Problems    Diagnosis  POA    *Cardiac arrest [I46.9]  Yes     Priority: High    Atherosclerosis of native artery of both lower extremities with intermittent claudication [I70.213]  Yes     Priority: High    Atrial fibrillation with RVR [I48.91]  Yes    Syncope [R55]  Yes    Hypokalemia [E87.6]  Yes    Acute renal failure [N17.9]  Yes    Bilateral carotid artery stenosis [I65.23]  Yes    Coronary artery disease involving native coronary artery of native heart with angina pectoris with documented spasm [I25.111]  Yes         3/29/2019    S/p LHC via R radial           LM, LAD, and LCX are patent with luminal irregularities  RCA mid 95% calcified lesion  Normal EF with LVEDP 8 mmHg           PCI of mid LAD with 2.5 x 30 and 2.5 x 15 mm Resolute REZA  PCI of proximal RCA to treat guide dissection with 3.5 x 22 Resolute REZA             Abnormal cardiovascular stress test [R94.39]  Yes         Nuclear stress test 2/2019     + ECG with 2 mm ST depression   No wall motion abnormalities   Test stopped at 6 minutes, 7 METs, 86% predicted heart rate   Stopped because of R leg claudication + ECG changes            Venous insufficiency of both lower extremities [I87.2]  Yes    History of back injury [Z87.828]  Not Applicable     20 years ago a bag fell on his back at work      Claudication in peripheral vascular disease [I73.9]  Yes    PAD (peripheral artery disease) [I73.9]  Yes     6/13/2014      1. Three vessel runoff below the knee bilaterally.  2. Severe disease of the terminal aorta.  3. Successful PTAS.  4. Bilateral common iliac reconstruction with 8 x 39 mm stent extending in the aorta  5. Right common illiac was treated with an overlapping 9 x 60 mm SES   6. Left common iliac was treated with an overlapping 8 x 80  mm SES down to external iliac  7. All stents were post dilated with 9.0 x 60 mm balloons  8. Moderate bilateral SFA disease  9. Chronically occluded left internal iliac artery        6/12/2015      1. 80% mid left SFA with a 20 mmHg resting mean gradient  2. Atherectomy with 2.2 Phonenix Volcano catheter  3. PTA with 6.0 x 100 mm Lutonix drug coated balloon        6/9/2017      Patent bilateral GRUPO/EIA stents  L EIA PTAS 9.0 x 80 mm Life stent post dilated with 8.0 mm balloon  Bilateral 70-80% SFA stenosis with 3 vessel run off          3/2018      L SFA intervention for claudication   75% L SFA with 3 vessel run off   7 mmHg resting and 33 mmHg hyperemic mean gradient         L CFA antegrade access   PTA with 6.0 x 150 mm   PTA with 6.0 x 150 mm Lutonix   3 vessel run off           4/13/2018       S/p R SFA PTA for winsome IIb claudication   R CFA antegrade access         R CFA with 50% stenosis-heavily calcified lesion               Baseline /90         R SFA with 70% stenosis with a significant resting and hyperemic mean gradient     3 vessel run off             PTA of R SFA with 6.0 x 150 + 6.0 x 60 mm Lutonix DCB        5/2/2018   Patent arteries post revascularization        2/2019     R 0.84 to 0.27   L 0.90 to 0.66   After ambulation   Severe R calf claudication        Peripheral angiogram 5/10/2019                   95% R CFA stenosis; heavily calcified    Patent SFA, POP + 3 vessel run off      Peripheral intervention 7/12/2019    S/p right CFA revascularization for winsome IIb claudication       Assisted JOSY approach   L radial access for visualization   5-6 slender R PT access for delivery of therapy          Atherectomy with SilverHawk catheter   PTA with 7.0 x 40 mm Lutonix DCB            Atherosclerosis of leg with intermittent claudication [I70.219]  Yes     Winsome IIB      HTN (hypertension) [I10]  Yes      Resolved Hospital Problems   No resolved problems to display.           He is  here for staged LCX PCI  R radial access  IVUS guided PCI  REZA candidate      Patient is a candidate for drug eluting stents.     Verbally the patient has expressed full understanding of the clinical importance of dual antiplatelet therapy compliance. Drug eluting stents require a minimum of 12 months of dual anti-platelet therapy.      Risks, benefits and alternatives of cardiac catheterization and possible intervention were discussed with the patient. All questions were answered and informed consent obtained.     I discussed the importance of compliance with dual antiplatelet therapy with the patient to prevent acute or late stent thrombosis with premature discontinuation of the therapy.

## 2019-10-11 NOTE — PLAN OF CARE
09:30 Pt transferred to recovery cath lab, bay 2 via stretcher, side-rails up x2.  Pt connected to cardiac monitor.  Vital signs stable.  No complaints of pain.  Left groin 7 [place] sheath in place connected to pressure line.  Site covered with gauze/tegaderm, intact/dry with no bleeding or hematoma.  Palpated and doppler bilateral pedal pulses.  Skin warm to touch, normal in color.  Neuro intact:  Sleepy, but arrousable .  Updated patient's wife.  Will continue to monitor patient.

## 2019-10-11 NOTE — NURSING
Dr Jenkins notified that patient had 9 beat run of v-tach. Patient was asymptomatic. Vitals stable. Will continue to be available as needed

## 2019-10-11 NOTE — NURSING
Patient returned from Cath procedure. VS documented. No bleeding or hematoma at L groin site. Patient denies any numbness, tingling, or pain in affected extremity.  Distal pulses palpable, skin warm to touch.

## 2019-10-11 NOTE — PLAN OF CARE
1030am   Post op EKG reviewed with Dr Jenkins.  Will obtain another at 12.  Patient resting comfortably in bed, denies chest pain.  Will continue to monitor.

## 2019-10-11 NOTE — NURSING
Patient denied chest pain this am. Per night nurse, patient was clipped and prepped for procedure this am. Asprin, plavix, and pletal, metoprolol given to patient before heart cath procedure. Patient empties bladder before . Transported via stretcher.

## 2019-10-11 NOTE — PLAN OF CARE
1905H Patient is awake and orientedx4. Care plan explained and verbalized understanding. VIP care model explained. On room air, no complaints of shortness of breath. On heart monitor running Sinus Rhythm with PACs at 77bpm. IV site to the right upper arm 20G; flushed and saline locked. Maintained on low salt, low fat diet. Instructed on NPO after midnight. Maintained on fall precaution. Bed in lowest position, bed alarms on, call light within reach and instructed to call for help when needed.   Will continue  to monitor.  Due medications given.   2233H Patient had 9-beat run of V-tach, vital signs stable. VN notified Cardiologist on call.     0430H Bilateral wrists and groin clipped and prepped. Chlorhexidine bath given. New gown provided.

## 2019-10-11 NOTE — PLAN OF CARE
10:13 Left groin 7 [place] arterial sheath removed and pressure applied for 21 minutes, gauze/tegaderm dressing applied CDI.  Vitals signs q3min while holding pressure. Vitals remain stable. No c/o pain. Will continue to monitor patient.   I have personally performed a face to face diagnostic evaluation on this patient. I have reviewed the ACP note and agree with the history, exam and plan of care, except as noted.

## 2019-10-11 NOTE — PROGRESS NOTES
Post cath recovery completed as ordered per md tobin. Repeat EKG done as ordered and shown to Md Tobin. Pt denies any CP, NV or any other discomfort at this time. Report given to Mary by Trudy MADSEN. Pt transported back to room 422 by this RN and Maci Madsen on stretcher w portable monitor in place. Slide board used to transfer back from stretcher to bed. Bedside assessment  done w receiving RN. Left groin arterial access site w gauze and tegaderm in place. Site soft, cdi no bleeding or hematoma. Right radial attempt to arterial access w tegaderm in place, cdi, no bleeding or hematoma. All pulses palpable +2. Tele box applied. Bed alarm set, call light within reach. Bedrest to be continued for the next 4 hrs, IVFs to be continued for the next 2 hrs as ordered. All questions answered at this time. Pt care released to telemetry at this time.

## 2019-10-12 VITALS
SYSTOLIC BLOOD PRESSURE: 145 MMHG | OXYGEN SATURATION: 96 % | TEMPERATURE: 98 F | HEART RATE: 77 BPM | RESPIRATION RATE: 16 BRPM | DIASTOLIC BLOOD PRESSURE: 71 MMHG | BODY MASS INDEX: 27.42 KG/M2 | WEIGHT: 191.56 LBS | HEIGHT: 70 IN

## 2019-10-12 LAB
BASOPHILS # BLD AUTO: 0.03 K/UL (ref 0–0.2)
BASOPHILS NFR BLD: 0.4 % (ref 0–1.9)
DIFFERENTIAL METHOD: ABNORMAL
EOSINOPHIL # BLD AUTO: 0.3 K/UL (ref 0–0.5)
EOSINOPHIL NFR BLD: 3.8 % (ref 0–8)
ERYTHROCYTE [DISTWIDTH] IN BLOOD BY AUTOMATED COUNT: 13.3 % (ref 11.5–14.5)
HCT VFR BLD AUTO: 27 % (ref 40–54)
HGB BLD-MCNC: 8.9 G/DL (ref 14–18)
LYMPHOCYTES # BLD AUTO: 1.3 K/UL (ref 1–4.8)
LYMPHOCYTES NFR BLD: 18.8 % (ref 18–48)
MCH RBC QN AUTO: 27.1 PG (ref 27–31)
MCHC RBC AUTO-ENTMCNC: 33 G/DL (ref 32–36)
MCV RBC AUTO: 82 FL (ref 82–98)
MONOCYTES # BLD AUTO: 0.6 K/UL (ref 0.3–1)
MONOCYTES NFR BLD: 8 % (ref 4–15)
NEUTROPHILS # BLD AUTO: 4.7 K/UL (ref 1.8–7.7)
NEUTROPHILS NFR BLD: 69 % (ref 38–73)
PLATELET # BLD AUTO: 283 K/UL (ref 150–350)
PMV BLD AUTO: 9.5 FL (ref 9.2–12.9)
RBC # BLD AUTO: 3.29 M/UL (ref 4.6–6.2)
WBC # BLD AUTO: 6.86 K/UL (ref 3.9–12.7)

## 2019-10-12 PROCEDURE — 99239 PR HOSPITAL DISCHARGE DAY,>30 MIN: ICD-10-PCS | Mod: ,,, | Performed by: INTERNAL MEDICINE

## 2019-10-12 PROCEDURE — 25000003 PHARM REV CODE 250: Performed by: INTERNAL MEDICINE

## 2019-10-12 PROCEDURE — 99239 HOSP IP/OBS DSCHRG MGMT >30: CPT | Mod: ,,, | Performed by: INTERNAL MEDICINE

## 2019-10-12 PROCEDURE — 36415 COLL VENOUS BLD VENIPUNCTURE: CPT

## 2019-10-12 PROCEDURE — 85025 COMPLETE CBC W/AUTO DIFF WBC: CPT

## 2019-10-12 PROCEDURE — 25000003 PHARM REV CODE 250: Performed by: NURSE PRACTITIONER

## 2019-10-12 PROCEDURE — 94761 N-INVAS EAR/PLS OXIMETRY MLT: CPT

## 2019-10-12 RX ORDER — ATORVASTATIN CALCIUM 80 MG/1
80 TABLET, FILM COATED ORAL DAILY
Qty: 90 TABLET | Refills: 3 | Status: SHIPPED | OUTPATIENT
Start: 2019-10-12 | End: 2019-10-14 | Stop reason: SDUPTHER

## 2019-10-12 RX ORDER — LOSARTAN POTASSIUM 100 MG/1
100 TABLET ORAL DAILY
Qty: 90 TABLET | Refills: 3 | Status: SHIPPED | OUTPATIENT
Start: 2019-10-12 | End: 2019-10-18 | Stop reason: SDUPTHER

## 2019-10-12 RX ORDER — METOPROLOL SUCCINATE 25 MG/1
TABLET, EXTENDED RELEASE ORAL
Qty: 90 TABLET | Refills: 3 | Status: SHIPPED | OUTPATIENT
Start: 2019-10-12 | End: 2019-10-14 | Stop reason: SDUPTHER

## 2019-10-12 RX ORDER — HYDROCHLOROTHIAZIDE 25 MG/1
25 TABLET ORAL DAILY
Qty: 90 TABLET | Refills: 3 | Status: ON HOLD | OUTPATIENT
Start: 2019-10-12 | End: 2020-01-31 | Stop reason: HOSPADM

## 2019-10-12 RX ORDER — CLOPIDOGREL BISULFATE 75 MG/1
75 TABLET ORAL DAILY
Qty: 90 TABLET | Refills: 3 | Status: SHIPPED | OUTPATIENT
Start: 2019-10-12 | End: 2019-10-18 | Stop reason: SDUPTHER

## 2019-10-12 RX ADMIN — METOPROLOL SUCCINATE 100 MG: 50 TABLET, EXTENDED RELEASE ORAL at 08:10

## 2019-10-12 RX ADMIN — HYDRALAZINE HYDROCHLORIDE 50 MG: 25 TABLET, FILM COATED ORAL at 06:10

## 2019-10-12 RX ADMIN — Medication 81 MG: at 08:10

## 2019-10-12 RX ADMIN — CILOSTAZOL 100 MG: 50 TABLET ORAL at 08:10

## 2019-10-12 RX ADMIN — CLOPIDOGREL BISULFATE 75 MG: 75 TABLET ORAL at 08:10

## 2019-10-12 RX ADMIN — ISOSORBIDE DINITRATE 20 MG: 10 TABLET ORAL at 08:10

## 2019-10-12 RX ADMIN — AMLODIPINE BESYLATE 10 MG: 5 TABLET ORAL at 09:10

## 2019-10-12 RX ADMIN — HYDRALAZINE HYDROCHLORIDE 50 MG: 25 TABLET, FILM COATED ORAL at 02:10

## 2019-10-12 RX ADMIN — AMIODARONE HYDROCHLORIDE 400 MG: 200 TABLET ORAL at 08:10

## 2019-10-12 NOTE — NURSING
Pt AAO  VSS NAD.   Tele box removed. IV removed.   Educated pt on medications, when to take, how to take, side effects as per VN nurse.  Pt refused Flu vaccine stated he will get the immunization at his job.  Pt verbalized understanding  Waiting for transport.

## 2019-10-12 NOTE — PLAN OF CARE
VN cued into patients room for DC instructions. Discharge teaching was reviewed with patient. Pt verbalized understanding of changes to medications, follow up appts, and clinical education. Refer to clinical references for pt education. IV and tele removed by bedside nurse. Patient family is on the way to the hospital; will notify bedside nurse when family has arrived to request wheelchair.

## 2019-10-12 NOTE — PLAN OF CARE
1905H Patient is awake and orientedx4. Care plan explained and verbalized understanding. VIP care model explained. On room air, no complaints of shortness of breath. On heart monitor running Sinus Rhythm with PVC at 89bpm. IV site to the right upper arm 20G; flushed and saline locked. Left groin angiogram puncture with dressing clean, dry and intact. No hematoma or swelling noted. Maintained on low salt, low fat diet. Maintained on fall precaution. Bed in lowest position, bed alarms on, call light within reach and instructed to call for help when needed. Will continue  to monitor.    Due medications given.

## 2019-10-12 NOTE — PLAN OF CARE
VN cued into room.  Pt resting comfortably in bed with call light and personal belongings within reach.  NADN.  No family at bedside.  Pt had no needs or complaints at this time.

## 2019-10-12 NOTE — PLAN OF CARE
Discharge orders noted, no HH or HME ordered.    Pt's nurse will go over medications/signs and symptoms prior to discharge       10/12/19 1207   Final Note   Assessment Type Final Discharge Note   Anticipated Discharge Disposition Home   What phone number can be called within the next 1-3 days to see how you are doing after discharge? [phone]   Hospital Follow Up  Appt(s) scheduled? No  (Offices closed for Weekend. Patient to schedule own follow up appointment. )   Right Care Referral Info   Post Acute Recommendation No Care     Kimberly Marcum RN Transitional Navigator  [phone]

## 2019-10-13 NOTE — PROGRESS NOTES
[facility]  Cardiology  Progress Note    Patient Name: Domingo Gross Sr.  MRN: [id number]  Admission Date: 10/4/2019  Hospital Length of Stay: 8 days  Code Status: No Order   Attending Physician: No att. providers found   Primary Care Physician: Analy Encarnacion MD  Expected Discharge Date: 10/12/2019  Principal Problem:Cardiac arrest    Subjective:     Hospital Course:   Out of hospital arrest    Interval History:   Had PCI yesterday. No complications. Hemodynamically stable. No symptoms.    Review of Systems   Constitution: Negative for malaise/fatigue.   HENT: Negative for congestion.    Eyes: Negative for blurred vision.   Cardiovascular: Negative for chest pain, claudication, cyanosis, dyspnea on exertion, irregular heartbeat, leg swelling, near-syncope, orthopnea, palpitations, paroxysmal nocturnal dyspnea and syncope.   Respiratory: Negative for shortness of breath.    Endocrine: Negative for polyuria.   Hematologic/Lymphatic: Negative for bleeding problem.   Skin: Negative for itching and rash.   Musculoskeletal: Negative for joint swelling, muscle cramps and muscle weakness.   Gastrointestinal: Negative for abdominal pain, hematemesis, hematochezia, melena, nausea and vomiting.   Genitourinary: Negative for dysuria and hematuria.   Neurological: Negative for dizziness, focal weakness, headaches, light-headedness, loss of balance and weakness.   Psychiatric/Behavioral: Negative for depression. The patient is not nervous/anxious.      Objective:     Vital Signs (Most Recent):  Temp: 97.6 °F (36.4 °C) (10/12/19 0803)  Pulse: 77 (10/12/19 1200)  Resp: 16 (10/12/19 1105)  BP: (!) 145/71 (10/12/19 1105)  SpO2: 96 % (10/12/19 1152) Vital Signs (24h Range):  Pulse:  [77-83] 77  Resp:  [16] 16  SpO2:  [96 %] 96 %  BP: (145)/(71) 145/71     Weight: 86.9 kg (191 lb 9.3 oz)  Body mass index is 27.49 kg/m².    SpO2: 96 %  O2 Device (Oxygen Therapy): room air    No intake or output data in the 24  hours ending 10/13/19 0903    Lines/Drains/Airways     None                 Physical Exam   Constitutional: He is oriented to person, place, and time. He appears well-developed and well-nourished.   HENT:   Head: Normocephalic and atraumatic.   Neck: Neck supple. No JVD present.   Cardiovascular: Normal rate, regular rhythm and normal heart sounds.   Pulses:       Carotid pulses are 2+ on the right side, and 2+ on the left side.       Radial pulses are 2+ on the right side, and 2+ on the left side.        Femoral pulses are 2+ on the right side, and 2+ on the left side.       Dorsalis pedis pulses are 2+ on the right side, and 2+ on the left side.        Posterior tibial pulses are 2+ on the right side, and 2+ on the left side.   Pulmonary/Chest: Effort normal and breath sounds normal.   Abdominal: Soft. Bowel sounds are normal.   Musculoskeletal: He exhibits no edema.   Neurological: He is alert and oriented to person, place, and time.   Skin: Skin is warm and dry.   Psychiatric: He has a normal mood and affect. His behavior is normal. Thought content normal.       Significant Labs:   BMP: No results for input(s): GLU, NA, K, CL, CO2, BUN, CREATININE, CALCIUM, MG in the last 48 hours., CMP No results for input(s): NA, K, CL, CO2, GLU, BUN, CREATININE, CALCIUM, PROT, ALBUMIN, BILITOT, ALKPHOS, AST, ALT, ANIONGAP, ESTGFRAFRICA, EGFRNONAA in the last 48 hours., CBC   Recent Labs   Lab 10/12/19  0008   WBC 6.86   HGB 8.9*   HCT 27.0*      , INR No results for input(s): INR, PROTIME in the last 48 hours., Lipid Panel No results for input(s): CHOL, HDL, LDLCALC, TRIG, CHOLHDL in the last 48 hours. and Troponin No results for input(s): TROPONINI in the last 48 hours.      Assessment and Plan:         Active Diagnoses:    Diagnosis Date Noted POA    PRINCIPAL PROBLEM:  Cardiac arrest [I46.9] 10/04/2019 Yes    Atrial fibrillation with RVR [I48.91] 10/04/2019 Yes    Syncope [R55] 10/04/2019 Yes    Hypokalemia  [E87.6] 10/04/2019 Yes    Acute renal failure [N17.9] 10/04/2019 Yes    Bilateral carotid artery stenosis [I65.23]  Yes    Coronary artery disease involving native coronary artery of native heart with angina pectoris with documented spasm [I25.111] 03/29/2019 Yes    Abnormal cardiovascular stress test [R94.39] 02/27/2019 Yes    Venous insufficiency of both lower extremities [I87.2] 06/04/2018 Yes    Atherosclerosis of native artery of both lower extremities with intermittent claudication [I70.213] 03/02/2018 Yes    History of back injury [Z87.828] 11/21/2014 Not Applicable    Claudication in peripheral vascular disease [I73.9] 06/13/2014 Yes    PAD (peripheral artery disease) [I73.9] 05/21/2014 Yes    Atherosclerosis of leg with intermittent claudication [I70.219] 04/21/2014 Yes    HTN (hypertension) [I10] 04/29/2013 Yes      Problems Resolved During this Admission:     CAD  -s/p PCI  -DAPT  -cardiac rehab        VTE Risk Mitigation (From admission, onward)    None          Hansel Rivers MD  Cardiology  [facility]

## 2019-10-13 NOTE — DISCHARGE SUMMARY
[facility]  Discharge Summary      Admit Date: 10/4/2019    Discharge Date and Time:  10/12/2019    Attending Physician: Hansel Rivers MD    Reason for Admission: Out of hospital arrest    Procedures Performed: Procedure(s) (LRB):  Ultrasound-coronary (Left)  ANGIOGRAM, CORONARY ARTERY (Left)  PTCA, Single Vessel (Left)  Stent, Drug Eluting, Single Vessel, Coronary (Left)    Hospital Course (synopsis of major diagnoses, care, treatment, and services provided during the course of the hospital stay):   Pt presented with out of hospital arrest. Had ROSC. Clinically stabalized and had successful PCI to LCX. No post procedure complications. Normal EF. DAPT x 1 year.      Final Diagnoses:    Principal Problem: Cardiac arrest   Secondary Diagnoses:   Active Hospital Problems    Diagnosis  POA    *Cardiac arrest [I46.9]  Yes    Atrial fibrillation with RVR [I48.91]  Yes    Syncope [R55]  Yes    Hypokalemia [E87.6]  Yes    Acute renal failure [N17.9]  Yes    Bilateral carotid artery stenosis [I65.23]  Yes    Coronary artery disease involving native coronary artery of native heart with angina pectoris with documented spasm [I25.111]  Yes         3/29/2019    S/p LHC via R radial           LM, LAD, and LCX are patent with luminal irregularities  RCA mid 95% calcified lesion  Normal EF with LVEDP 8 mmHg           PCI of mid LAD with 2.5 x 30 and 2.5 x 15 mm Resolute REZA  PCI of proximal RCA to treat guide dissection with 3.5 x 22 Resolute REZA             Abnormal cardiovascular stress test [R94.39]  Yes         Nuclear stress test 2/2019     + ECG with 2 mm ST depression   No wall motion abnormalities   Test stopped at 6 minutes, 7 METs, 86% predicted heart rate   Stopped because of R leg claudication + ECG changes            Venous insufficiency of both lower extremities [I87.2]  Yes    Atherosclerosis of native artery of both lower extremities with intermittent claudication [I70.213]  Yes     History of back injury [Z87.828]  Not Applicable     20 years ago a bag fell on his back at work      Claudication in peripheral vascular disease [I73.9]  Yes    PAD (peripheral artery disease) [I73.9]  Yes     6/13/2014      1. Three vessel runoff below the knee bilaterally.  2. Severe disease of the terminal aorta.  3. Successful PTAS.  4. Bilateral common iliac reconstruction with 8 x 39 mm stent extending in the aorta  5. Right common illiac was treated with an overlapping 9 x 60 mm SES   6. Left common iliac was treated with an overlapping 8 x 80 mm SES down to external iliac  7. All stents were post dilated with 9.0 x 60 mm balloons  8. Moderate bilateral SFA disease  9. Chronically occluded left internal iliac artery        6/12/2015      1. 80% mid left SFA with a 20 mmHg resting mean gradient  2. Atherectomy with 2.2 HedgeChatterx Volcano catheter  3. PTA with 6.0 x 100 mm Lutonix drug coated balloon        6/9/2017      Patent bilateral GRUPO/EIA stents  L EIA PTAS 9.0 x 80 mm Life stent post dilated with 8.0 mm balloon  Bilateral 70-80% SFA stenosis with 3 vessel run off          3/2018      L SFA intervention for claudication   75% L SFA with 3 vessel run off   7 mmHg resting and 33 mmHg hyperemic mean gradient         L CFA antegrade access   PTA with 6.0 x 150 mm   PTA with 6.0 x 150 mm Lutonix   3 vessel run off           4/13/2018       S/p R SFA PTA for winsome IIb claudication   R CFA antegrade access         R CFA with 50% stenosis-heavily calcified lesion               Baseline /90         R SFA with 70% stenosis with a significant resting and hyperemic mean gradient     3 vessel run off             PTA of R SFA with 6.0 x 150 + 6.0 x 60 mm Lutonix DCB        5/2/2018   Patent arteries post revascularization        2/2019     R 0.84 to 0.27   L 0.90 to 0.66   After ambulation   Severe R calf claudication        Peripheral angiogram 5/10/2019                   95% R CFA stenosis; heavily  calcified    Patent SFA, POP + 3 vessel run off      Peripheral intervention 7/12/2019    S/p right CFA revascularization for winsome IIb claudication       Assisted JOSY approach   L radial access for visualization   5-6 slender R PT access for delivery of therapy          Atherectomy with SilverHawk catheter   PTA with 7.0 x 40 mm Lutonix DCB            Atherosclerosis of leg with intermittent claudication [I70.219]  Yes     Winsome IIB      HTN (hypertension) [I10]  Yes      Resolved Hospital Problems   No resolved problems to display.       Discharged Condition: good    Disposition: Home or Self Care    Follow Up/Patient Instructions:   As previously scheduled    Medications:  Reconciled Home Medications:      Medication List      CHANGE how you take these medications    atorvastatin 80 MG tablet  Commonly known as:  LIPITOR  Take 1 tablet (80 mg total) by mouth once daily.  What changed:    · medication strength  · See the new instructions.     losartan 100 MG tablet  Commonly known as:  COZAAR  Take 1 tablet (100 mg total) by mouth once daily.  What changed:  See the new instructions.        CONTINUE taking these medications    albuterol 90 mcg/actuation inhaler  Commonly known as:  PROVENTIL/VENTOLIN HFA  INHALE 2 PUFFS INTO THE LUNGS EVERY 6 HOURS AS NEEDED FOR WHEEZING     ASPIR-81 ORAL  Take 1 tablet by mouth.     cilostazol 100 MG Tab  Commonly known as:  PLETAL  TAKE 1 TABLET(100 MG) BY MOUTH TWICE DAILY     clopidogrel 75 mg tablet  Commonly known as:  PLAVIX  Take 1 tablet (75 mg total) by mouth once daily.     coenzyme Q10 100 mg capsule  Commonly known as:  COQ-10  Take 1 capsule (100 mg total) by mouth once daily.     flunisolide 25 mcg (0.025%) 25 mcg (0.025 %) Spry  Commonly known as:  NASALIDE  2 sprays by Nasal route 2 (two) times daily.     hydroCHLOROthiazide 25 MG tablet  Commonly known as:  HYDRODIURIL  Take 1 tablet (25 mg total) by mouth once daily.     metoprolol succinate 25 MG 24 hr  tablet  Commonly known as:  TOPROL-XL  TAKE 1 TABLET BY MOUTH EVERY DAY          Discharge Procedure Orders   Diet Cardiac     Activity as tolerated     Follow-up Information     Schedule an appointment as soon as possible for a visit with Analy Encarnacion MD.    Specialty:  Internal Medicine  Why:  Follow-Up / Offices closed for Weekend. Patient to schedule own follow up appointment.   Contact information:  [address]  [place] [address] [phone]

## 2019-10-14 ENCOUNTER — PATIENT OUTREACH (OUTPATIENT)
Dept: ADMINISTRATIVE | Facility: CLINIC | Age: 59
End: 2019-10-14

## 2019-10-14 ENCOUNTER — PATIENT OUTREACH (OUTPATIENT)
Dept: ADMINISTRATIVE | Facility: HOSPITAL | Age: 59
End: 2019-10-14

## 2019-10-14 DIAGNOSIS — I10 ESSENTIAL HYPERTENSION: ICD-10-CM

## 2019-10-14 DIAGNOSIS — E78.5 HYPERLIPIDEMIA, UNSPECIFIED HYPERLIPIDEMIA TYPE: ICD-10-CM

## 2019-10-14 NOTE — PATIENT INSTRUCTIONS
Discharge Instructions for Atrial Fibrillation  You have been diagnosed with an abnormal heart rhythm called atrial fibrillation. With this condition, your hearts 2 upper chambers quiver rather than squeeze the blood out in a normal pattern. This leads to an irregular and sometimes rapid heartbeat. Some people will develop associated symptoms such as a flip-flopping heartbeat, chest pain, lightheadedness, or shortness of breath. Other people may have no symptoms at all. Atrial fibrillation is serious because it affects the hearts ability to fill with blood as it should. Blood clots may form. This increases the risk for stroke. Untreated atrial fibrillation can also lead to heart failure. Atrial fibrillation can be controlled. With treatment, most people with atrial fibrillation lead normal lives.  Treatment options  Recommended treatment for atrial fibrillation depends on your age, symptoms, how long you have had atrial fibrillation, and other factors. You will have a complete evaluation to find out if you have any abnormalities that caused your heart to go into atrial fibrillation. This might be blocked heart arteries or a thyroid problem. Your doctor will assess your particular case and discuss choices with you.  Treatment choices may include:  · Treating an underlying disorder that puts you at risk for atrial fibrillation. For example, correcting an abnormal thyroid or electrolyte problem, or treating a blocked heart artery.  · Restoring a normal heart rhythm with an electrical shock (cardioversion) or with an antiarrhythmic medicine (chemical cardioversion)  · Using medicine to control your heart rate in atrial fibrillation.  · Preventing the risk for blood clot and stroke using blood-thinning medicines. Your doctor will tell you what he or she recommends. Choices may include aspirin, clopidogrel, warfarin, dabigatran, rivaroxaban, apixaban, and edoxaban.  · Doing catheter ablation or a surgical maze  procedure. These use different methods to destroy certain areas of heart tissue. This interrupts the electrical signals causing atrial fibrillation. One of these procedures may be a choice when medicines do not work, or as an alternative to long-term medicine.  · Other treatment choices may be recommended for you by your doctor.  Managing risk factors for stroke and preventing heart failure are important parts of any treatment plan for atrial fibrillation.  Home care  · Take your medicines exactly as directed. Dont skip doses.  · Work with your doctor to find the right medicines and doses for you.  · Learn to take your own pulse. Keep a record of your results. Ask your doctor which pulse rates mean that you need medical attention. Slowing your pulse is often the goal of treatment. Ask your doctor if its OK for you to use an automatic machine to check your pulse at home. Sometimes these machines dont count the pulse correctly when you have atrial fibrillation.  · Limit your intake of coffee, tea, cola, and other beverages with caffeine. Talk with your doctor about whether you should eliminate caffeine.  · Avoid over-the-counter medicines that have caffeine in them.  · Let your doctor know what medicines you take, including prescription and over-the-counter medicines, as well as any supplements. They interfere with some medicines given for atrial fibrillation.  · Ask your doctor about whether you can drink alcohol. Some people need to avoid alcohol to better treat atrial fibrillation. If you are taking blood-thinner medicines, alcohol may interfere with them by increasing their effect.  · Never take stimulants such as amphetamines or cocaine. These drugs can speed up your heart rate and trigger atrial fibrillation.  Follow-up care  Follow up with your doctor, or as advised.     When should I call my healthcare provider  Call your healthcare provider right away if you have any of the  following:  · Weakness  · Dizziness  · Fainting  · Fatigue  · Shortness of breath  · Chest pain with increased activity  · A change in the usual regularity of your heartbeat, or an unusually fast heartbeat   Date Last Reviewed: 4/23/2016  © 5781-1937 [employer]. [address] [place] [address], [place] [address]. All rights reserved. This information is not intended as a substitute for professional medical care. Always follow your healthcare professional's instructions.

## 2019-10-14 NOTE — TELEPHONE ENCOUNTER
Pt was recently discharged from the hospital for procedure     Requesting to know when he is cleared to go back to work     Please advise

## 2019-10-14 NOTE — TELEPHONE ENCOUNTER
----- Message from Hansel Rivers MD sent at 10/13/2019  9:06 AM CDT -----  Clinic visit with NP in 2 weeks for hospital f/u

## 2019-10-14 NOTE — TELEPHONE ENCOUNTER
----- Message from Marci Barker sent at 10/14/2019 10:28 AM CDT -----  Pt requesting a phone call from the nurse , he want to know when can he go back to work  Please call pt asap.

## 2019-10-15 ENCOUNTER — TELEPHONE (OUTPATIENT)
Dept: CARDIOLOGY | Facility: CLINIC | Age: 59
End: 2019-10-15

## 2019-10-15 ENCOUNTER — PATIENT MESSAGE (OUTPATIENT)
Dept: CARDIOLOGY | Facility: CLINIC | Age: 59
End: 2019-10-15

## 2019-10-15 RX ORDER — METOPROLOL SUCCINATE 25 MG/1
TABLET, EXTENDED RELEASE ORAL
Qty: 90 TABLET | Refills: 3 | Status: SHIPPED | OUTPATIENT
Start: 2019-10-15 | End: 2019-12-17 | Stop reason: DRUGHIGH

## 2019-10-15 RX ORDER — ATORVASTATIN CALCIUM 80 MG/1
80 TABLET, FILM COATED ORAL DAILY
Qty: 90 TABLET | Refills: 3 | Status: SHIPPED | OUTPATIENT
Start: 2019-10-15 | End: 2020-01-08 | Stop reason: SDUPTHER

## 2019-10-15 NOTE — TELEPHONE ENCOUNTER
----- Message from BAO Cerna ANP sent at 10/15/2019  1:28 PM CDT -----  Contact: Marce   Return to work letter completed. Please print it from his chart and call him to pick it up    Thanks  BH

## 2019-10-18 DIAGNOSIS — I10 ESSENTIAL HYPERTENSION: ICD-10-CM

## 2019-10-21 RX ORDER — CLOPIDOGREL BISULFATE 75 MG/1
75 TABLET ORAL DAILY
Qty: 90 TABLET | Refills: 3 | Status: SHIPPED | OUTPATIENT
Start: 2019-10-21 | End: 2020-01-08 | Stop reason: SDUPTHER

## 2019-10-21 RX ORDER — LOSARTAN POTASSIUM 100 MG/1
100 TABLET ORAL DAILY
Qty: 90 TABLET | Refills: 3 | Status: SHIPPED | OUTPATIENT
Start: 2019-10-21 | End: 2020-01-08 | Stop reason: SDUPTHER

## 2019-10-25 ENCOUNTER — LAB VISIT (OUTPATIENT)
Dept: LAB | Facility: HOSPITAL | Age: 59
End: 2019-10-25
Attending: INTERNAL MEDICINE
Payer: COMMERCIAL

## 2019-10-25 DIAGNOSIS — Z12.11 COLON CANCER SCREENING: ICD-10-CM

## 2019-10-25 PROCEDURE — 82274 ASSAY TEST FOR BLOOD FECAL: CPT

## 2019-10-28 ENCOUNTER — OFFICE VISIT (OUTPATIENT)
Dept: INTERNAL MEDICINE | Facility: CLINIC | Age: 59
End: 2019-10-28
Payer: COMMERCIAL

## 2019-10-28 ENCOUNTER — LAB VISIT (OUTPATIENT)
Dept: LAB | Facility: HOSPITAL | Age: 59
End: 2019-10-28
Attending: INTERNAL MEDICINE
Payer: COMMERCIAL

## 2019-10-28 VITALS
HEART RATE: 77 BPM | HEIGHT: 70 IN | BODY MASS INDEX: 26.57 KG/M2 | DIASTOLIC BLOOD PRESSURE: 58 MMHG | WEIGHT: 185.63 LBS | OXYGEN SATURATION: 100 % | SYSTOLIC BLOOD PRESSURE: 146 MMHG

## 2019-10-28 DIAGNOSIS — I10 ESSENTIAL HYPERTENSION: ICD-10-CM

## 2019-10-28 DIAGNOSIS — E78.5 HYPERLIPIDEMIA LDL GOAL <70: ICD-10-CM

## 2019-10-28 DIAGNOSIS — I25.10 CORONARY ARTERY DISEASE, ANGINA PRESENCE UNSPECIFIED, UNSPECIFIED VESSEL OR LESION TYPE, UNSPECIFIED WHETHER NATIVE OR TRANSPLANTED HEART: ICD-10-CM

## 2019-10-28 DIAGNOSIS — I73.9 PAD (PERIPHERAL ARTERY DISEASE): ICD-10-CM

## 2019-10-28 DIAGNOSIS — Z00.00 ANNUAL PHYSICAL EXAM: ICD-10-CM

## 2019-10-28 DIAGNOSIS — Z12.5 PROSTATE CANCER SCREENING: ICD-10-CM

## 2019-10-28 DIAGNOSIS — I48.91 ATRIAL FIBRILLATION WITH RVR: ICD-10-CM

## 2019-10-28 LAB
ALBUMIN SERPL BCP-MCNC: 2.4 G/DL (ref 3.5–5.2)
ALP SERPL-CCNC: 92 U/L (ref 55–135)
ALT SERPL W/O P-5'-P-CCNC: 14 U/L (ref 10–44)
ANION GAP SERPL CALC-SCNC: 8 MMOL/L (ref 8–16)
AST SERPL-CCNC: 22 U/L (ref 10–40)
BASOPHILS # BLD AUTO: 0.05 K/UL (ref 0–0.2)
BASOPHILS NFR BLD: 0.8 % (ref 0–1.9)
BILIRUB SERPL-MCNC: 0.2 MG/DL (ref 0.1–1)
BUN SERPL-MCNC: 19 MG/DL (ref 6–20)
CALCIUM SERPL-MCNC: 8.4 MG/DL (ref 8.7–10.5)
CHLORIDE SERPL-SCNC: 106 MMOL/L (ref 95–110)
CHOLEST SERPL-MCNC: 187 MG/DL (ref 120–199)
CHOLEST/HDLC SERPL: 3.7 {RATIO} (ref 2–5)
CO2 SERPL-SCNC: 26 MMOL/L (ref 23–29)
COMPLEXED PSA SERPL-MCNC: 1.3 NG/ML (ref 0–4)
CREAT SERPL-MCNC: 1.6 MG/DL (ref 0.5–1.4)
DIFFERENTIAL METHOD: ABNORMAL
EOSINOPHIL # BLD AUTO: 0.2 K/UL (ref 0–0.5)
EOSINOPHIL NFR BLD: 3.6 % (ref 0–8)
ERYTHROCYTE [DISTWIDTH] IN BLOOD BY AUTOMATED COUNT: 12.7 % (ref 11.5–14.5)
EST. GFR  (AFRICAN AMERICAN): 54.1 ML/MIN/1.73 M^2
EST. GFR  (NON AFRICAN AMERICAN): 46.8 ML/MIN/1.73 M^2
ESTIMATED AVG GLUCOSE: 117 MG/DL (ref 68–131)
GLUCOSE SERPL-MCNC: 71 MG/DL (ref 70–110)
HBA1C MFR BLD HPLC: 5.7 % (ref 4–5.6)
HCT VFR BLD AUTO: 30 % (ref 40–54)
HDLC SERPL-MCNC: 50 MG/DL (ref 40–75)
HDLC SERPL: 26.7 % (ref 20–50)
HGB BLD-MCNC: 9.5 G/DL (ref 14–18)
IMM GRANULOCYTES # BLD AUTO: 0.01 K/UL (ref 0–0.04)
IMM GRANULOCYTES NFR BLD AUTO: 0.2 % (ref 0–0.5)
IRON SERPL-MCNC: 43 UG/DL (ref 45–160)
LDLC SERPL CALC-MCNC: 101.4 MG/DL (ref 63–159)
LYMPHOCYTES # BLD AUTO: 1.5 K/UL (ref 1–4.8)
LYMPHOCYTES NFR BLD: 23.5 % (ref 18–48)
MCH RBC QN AUTO: 26.8 PG (ref 27–31)
MCHC RBC AUTO-ENTMCNC: 31.7 G/DL (ref 32–36)
MCV RBC AUTO: 85 FL (ref 82–98)
MONOCYTES # BLD AUTO: 0.5 K/UL (ref 0.3–1)
MONOCYTES NFR BLD: 8.5 % (ref 4–15)
NEUTROPHILS # BLD AUTO: 4 K/UL (ref 1.8–7.7)
NEUTROPHILS NFR BLD: 63.4 % (ref 38–73)
NONHDLC SERPL-MCNC: 137 MG/DL
NRBC BLD-RTO: 0 /100 WBC
PLATELET # BLD AUTO: 383 K/UL (ref 150–350)
PMV BLD AUTO: 10 FL (ref 9.2–12.9)
POTASSIUM SERPL-SCNC: 3.5 MMOL/L (ref 3.5–5.1)
PROT SERPL-MCNC: 6 G/DL (ref 6–8.4)
RBC # BLD AUTO: 3.55 M/UL (ref 4.6–6.2)
SATURATED IRON: 13 % (ref 20–50)
SODIUM SERPL-SCNC: 140 MMOL/L (ref 136–145)
TOTAL IRON BINDING CAPACITY: 340 UG/DL (ref 250–450)
TRANSFERRIN SERPL-MCNC: 230 MG/DL (ref 200–375)
TRIGL SERPL-MCNC: 178 MG/DL (ref 30–150)
WBC # BLD AUTO: 6.34 K/UL (ref 3.9–12.7)

## 2019-10-28 PROCEDURE — 99396 PREV VISIT EST AGE 40-64: CPT | Mod: S$GLB,,, | Performed by: INTERNAL MEDICINE

## 2019-10-28 PROCEDURE — 99396 PR PREVENTIVE VISIT,EST,40-64: ICD-10-PCS | Mod: S$GLB,,, | Performed by: INTERNAL MEDICINE

## 2019-10-28 PROCEDURE — 84153 ASSAY OF PSA TOTAL: CPT

## 2019-10-28 PROCEDURE — 80061 LIPID PANEL: CPT

## 2019-10-28 PROCEDURE — 36415 COLL VENOUS BLD VENIPUNCTURE: CPT | Mod: PO

## 2019-10-28 PROCEDURE — 99999 PR PBB SHADOW E&M-EST. PATIENT-LVL IV: ICD-10-PCS | Mod: PBBFAC,,, | Performed by: INTERNAL MEDICINE

## 2019-10-28 PROCEDURE — 3077F SYST BP >= 140 MM HG: CPT | Mod: CPTII,S$GLB,, | Performed by: INTERNAL MEDICINE

## 2019-10-28 PROCEDURE — 80053 COMPREHEN METABOLIC PANEL: CPT

## 2019-10-28 PROCEDURE — 85025 COMPLETE CBC W/AUTO DIFF WBC: CPT

## 2019-10-28 PROCEDURE — 83036 HEMOGLOBIN GLYCOSYLATED A1C: CPT

## 2019-10-28 PROCEDURE — 99999 PR PBB SHADOW E&M-EST. PATIENT-LVL IV: CPT | Mod: PBBFAC,,, | Performed by: INTERNAL MEDICINE

## 2019-10-28 PROCEDURE — 83540 ASSAY OF IRON: CPT

## 2019-10-28 PROCEDURE — 3078F PR MOST RECENT DIASTOLIC BLOOD PRESSURE < 80 MM HG: ICD-10-PCS | Mod: CPTII,S$GLB,, | Performed by: INTERNAL MEDICINE

## 2019-10-28 PROCEDURE — 3078F DIAST BP <80 MM HG: CPT | Mod: CPTII,S$GLB,, | Performed by: INTERNAL MEDICINE

## 2019-10-28 PROCEDURE — 3077F PR MOST RECENT SYSTOLIC BLOOD PRESSURE >= 140 MM HG: ICD-10-PCS | Mod: CPTII,S$GLB,, | Performed by: INTERNAL MEDICINE

## 2019-10-28 NOTE — PROGRESS NOTES
Subjective:       Patient ID: Domingo Gross Sr. is a 59 y.o. male.    Chief Complaint: Follow-up (after heart attack )    HPI Mr. Renato Dickerson is a 59 year old male with CAD, HTN, HLD, PAD, s/p recent cardiac arrest. Patient states that he was at a [employer]  event and he passed out. EMS was called and he was given CPR and one shock. This occurred on Friday Oct 4. He was admitted to the hospital for 8 days. During that time, PCI to LCX was done. He is on DAPT for one year. He has shortness of breath with exertion which he noticed after the cardiac arrest on Oct 4th. He otherwise has no complaints today. He denies any chest pain or leg swelling.He presents for annual exam and follow up after the cardiac arrest.    Review of Systems   Constitutional: Positive for activity change. Negative for unexpected weight change.   HENT: Negative for hearing loss, rhinorrhea and trouble swallowing.    Eyes: Negative for discharge and visual disturbance.   Respiratory: Positive for shortness of breath (with exertion). Negative for chest tightness and wheezing.    Cardiovascular: Negative for chest pain, palpitations and leg swelling.   Gastrointestinal: Negative for blood in stool, constipation, diarrhea and vomiting.   Endocrine: Negative for polydipsia and polyuria.   Genitourinary: Negative for difficulty urinating, hematuria and urgency.   Musculoskeletal: Negative for arthralgias, joint swelling and neck pain.   Neurological: Negative for weakness and headaches.   Psychiatric/Behavioral: Negative for confusion and dysphoric mood.       Objective:      Physical Exam   Constitutional: He is oriented to person, place, and time. He appears well-developed and well-nourished. No distress.   HENT:   Head: Normocephalic and atraumatic.   Right Ear: External ear normal.   Left Ear: External ear normal.   Nose: Nose normal.   Mouth/Throat: Oropharynx is clear and moist.   Eyes: Conjunctivae and EOM are normal. Right eye exhibits no  discharge. Left eye exhibits no discharge.   Neck: Neck supple. No tracheal deviation present. No thyromegaly present.   Cardiovascular: Normal rate, regular rhythm, normal heart sounds and intact distal pulses. Exam reveals no gallop and no friction rub.   No murmur heard.  Pulmonary/Chest: Effort normal and breath sounds normal. No stridor. No respiratory distress. He has no wheezes. He has no rales.   Abdominal: Soft. Bowel sounds are normal. He exhibits no distension and no mass. There is no tenderness. There is no rebound and no guarding. No hernia.   Musculoskeletal: He exhibits no edema, tenderness or deformity.   Lymphadenopathy:     He has no cervical adenopathy.   Neurological: He is alert and oriented to person, place, and time. No cranial nerve deficit.   Skin: Skin is warm and dry. No rash noted. He is not diaphoretic. No erythema.   Psychiatric: He has a normal mood and affect. His behavior is normal. Judgment and thought content normal.   Vitals reviewed.      Assessment:       1. Annual physical exam    2. Coronary artery disease, angina presence unspecified, unspecified vessel or lesion type, unspecified whether native or transplanted heart Stable   3. Prostate cancer screening    4. PAD (peripheral artery disease) Stable   5. Hyperlipidemia LDL goal <70 Active   6. Atrial fibrillation with RVR Well controlled   7. Essential hypertension Well controlled       Plan:         Domingo was seen today for follow-up.    Diagnoses and all orders for this visit:    Annual physical exam  -     Comprehensive metabolic panel; Future  -     CBC auto differential; Future  -     Hemoglobin A1c; Future  -     Lipid panel; Future  -     Iron and TIBC; Future    Coronary artery disease, angina presence unspecified, unspecified vessel or lesion type, unspecified whether native or transplanted heart  Comments:  Continue current meds, follow up with Cardiology. Will see Cardiology in 3 days    Prostate cancer  screening  Comments:  Benefits vs risks discussed and pt wishes to proceed. PSA ordered  Orders:  -     PSA, Screening; Future    PAD (peripheral artery disease)  Comments:  Continue current meds. Managed by cardiology    Hyperlipidemia LDL goal <70  Comments:  Lipid profile pending. If not at goal on atorvastatin 80 mg, may consider use of repatha.     Atrial fibrillation with RVR  Comments:  Rate controlled. Continue current meds    Essential hypertension  Comments:  Continue current meds    RTC 6 months

## 2019-10-29 ENCOUNTER — PATIENT OUTREACH (OUTPATIENT)
Dept: ADMINISTRATIVE | Facility: OTHER | Age: 59
End: 2019-10-29

## 2019-10-29 LAB — HEMOCCULT STL QL IA: NEGATIVE

## 2019-11-04 ENCOUNTER — OFFICE VISIT (OUTPATIENT)
Dept: CARDIOLOGY | Facility: CLINIC | Age: 59
End: 2019-11-04
Payer: COMMERCIAL

## 2019-11-04 ENCOUNTER — PATIENT OUTREACH (OUTPATIENT)
Dept: ADMINISTRATIVE | Facility: OTHER | Age: 59
End: 2019-11-04

## 2019-11-04 VITALS
BODY MASS INDEX: 26.92 KG/M2 | HEART RATE: 76 BPM | OXYGEN SATURATION: 97 % | HEIGHT: 70 IN | WEIGHT: 188.06 LBS | DIASTOLIC BLOOD PRESSURE: 68 MMHG | SYSTOLIC BLOOD PRESSURE: 148 MMHG

## 2019-11-04 DIAGNOSIS — E78.5 HYPERLIPIDEMIA LDL GOAL <70: ICD-10-CM

## 2019-11-04 DIAGNOSIS — I25.111 CORONARY ARTERY DISEASE INVOLVING NATIVE CORONARY ARTERY OF NATIVE HEART WITH ANGINA PECTORIS WITH DOCUMENTED SPASM: Primary | ICD-10-CM

## 2019-11-04 DIAGNOSIS — I73.9 PAD (PERIPHERAL ARTERY DISEASE): ICD-10-CM

## 2019-11-04 DIAGNOSIS — I65.23 BILATERAL CAROTID ARTERY STENOSIS: ICD-10-CM

## 2019-11-04 DIAGNOSIS — I46.9 CARDIAC ARREST: ICD-10-CM

## 2019-11-04 DIAGNOSIS — I10 ESSENTIAL HYPERTENSION: ICD-10-CM

## 2019-11-04 PROCEDURE — 3078F PR MOST RECENT DIASTOLIC BLOOD PRESSURE < 80 MM HG: ICD-10-PCS | Mod: CPTII,S$GLB,, | Performed by: INTERNAL MEDICINE

## 2019-11-04 PROCEDURE — 3077F PR MOST RECENT SYSTOLIC BLOOD PRESSURE >= 140 MM HG: ICD-10-PCS | Mod: CPTII,S$GLB,, | Performed by: INTERNAL MEDICINE

## 2019-11-04 PROCEDURE — 3077F SYST BP >= 140 MM HG: CPT | Mod: CPTII,S$GLB,, | Performed by: INTERNAL MEDICINE

## 2019-11-04 PROCEDURE — 99214 PR OFFICE/OUTPT VISIT, EST, LEVL IV, 30-39 MIN: ICD-10-PCS | Mod: S$GLB,,, | Performed by: INTERNAL MEDICINE

## 2019-11-04 PROCEDURE — 3078F DIAST BP <80 MM HG: CPT | Mod: CPTII,S$GLB,, | Performed by: INTERNAL MEDICINE

## 2019-11-04 PROCEDURE — 99999 PR PBB SHADOW E&M-EST. PATIENT-LVL III: ICD-10-PCS | Mod: PBBFAC,,, | Performed by: INTERNAL MEDICINE

## 2019-11-04 PROCEDURE — 99999 PR PBB SHADOW E&M-EST. PATIENT-LVL III: CPT | Mod: PBBFAC,,, | Performed by: INTERNAL MEDICINE

## 2019-11-04 PROCEDURE — 3008F PR BODY MASS INDEX (BMI) DOCUMENTED: ICD-10-PCS | Mod: CPTII,S$GLB,, | Performed by: INTERNAL MEDICINE

## 2019-11-04 PROCEDURE — 99214 OFFICE O/P EST MOD 30 MIN: CPT | Mod: S$GLB,,, | Performed by: INTERNAL MEDICINE

## 2019-11-04 PROCEDURE — 3008F BODY MASS INDEX DOCD: CPT | Mod: CPTII,S$GLB,, | Performed by: INTERNAL MEDICINE

## 2019-11-04 NOTE — PROGRESS NOTES
Subjective:   @Patient ID:  Domingo Gross Sr. is a 59 y.o. male who presents for follow-up of CAD and PVD    HPI:     He is here post hospital discharge. He denies any chest pain. He was admitted to in 10/4 with cardiac arrest, LHC showed LCX sig lesion that was believed to be the culprit. S/p PCi IVUS guided 3.0 x 15 mm resolute post dil with 3.5 NC.   He reports feeling tired and fatigue post cardiac arrest.   He is compliant with his medication  Anemia work up is currently being done by Dr. Encarnacion.   It was reported that patient had a.fib,I haven't seen ekg that showed a.fib. He is not on OAC likely because of anemia.        PCI with REZA RCA (3/2019)           ELISABETH with stress 5/2017:              R 1.01 to 0.44              L 0.92 to 0.45               After 6 minutes ambulation        CTA 5/2017:                    Patent distal aorta and bilateral GRUPO/EIA stents              L EIA with de shawna 90% stenosis              L SFA 80% with 3 vessel run off                 R EIA/CFA with moderate disease              R SFA 80% stenosis with 3 vessel run off           Peripheral angiogram with intervention 6/2017         Patent bilateral GRUPO/EIA stents.    De shawna 80% left EIA stenosis treated with 9.0 x 80 mm Lifestar  stent post dilated with 8.0 mm balloon.    Bilateral 70-80% SFA stenosis with 3 vessel run off.           3/2018        L SFA intervention for claudication              75% L SFA with 3 vessel run off              7 mmHg resting and 33 mmHg hyperemic mean gradient                                L CFA antegrade access              PTA with 6.0 x 150 mm              PTA with 6.0 x 150 mm Lutonix              3 vessel run off               4/13/2018                    S/p R SFA PTA for winsome IIb claudication              R CFA antegrade access                    R CFA with 50% stenosis-heavily calcified lesion                          Baseline /90                    R SFA with 70% stenosis  with a significant resting and hyperemic mean gradient               3 vessel run off                        PTA of R SFA with 6.0 x 150 + 6.0 x 60 mm Lutonix DCB           5/2/2018              Patent arteries post revascularization           2/2019                 R 0.84 to 0.27              L 0.90 to 0.66              After ambulation              Severe R calf claudication              Nuclear stress test 2/2019                 + ECG with 2 mm ST depression              No wall motion abnormalities              Test stopped at 6 minutes, 7 METs, 86% predicted heart rate              Stopped because of R leg claudication + ECG changes              S/p PCI RCA and LAD with REZA 3/2019                    RCA 3.5 x 22 mm Resolute REZA              LAD 2.5 x 30 and 2.5 x 25 mm Resolute REZA        Peripheral aortogram 5/10/2019                 R CFA 95% stenosis              3 vessel run off     · 7/2019       Atherectomy with SilverHawk catheter  RCFA         PTA with 7.0 x 40 mm Lutonix DCB        Seen by Dr. Giron for R CFA endarterectomy but preferable should be off plavix for at least 5 days. He has a risk for stent thrombosis with premature interruption of DAPT.              Patient Active Problem List    Diagnosis Date Noted    Cardiac arrest 10/04/2019    Atrial fibrillation with RVR 10/04/2019    Syncope 10/04/2019    Hypokalemia 10/04/2019    Acute renal failure 10/04/2019    Bilateral carotid artery stenosis     Coronary artery disease involving native coronary artery of native heart with angina pectoris with documented spasm 03/29/2019         3/29/2019    S/p LHC via R radial           LM, LAD, and LCX are patent with luminal irregularities  RCA mid 95% calcified lesion  Normal EF with LVEDP 8 mmHg           PCI of mid LAD with 2.5 x 30 and 2.5 x 15 mm Resolute REZA  PCI of proximal RCA to treat guide dissection with 3.5 x 22 Resolute REZA             Abnormal cardiovascular stress test 02/27/2019          Nuclear stress test 2/2019     + ECG with 2 mm ST depression   No wall motion abnormalities   Test stopped at 6 minutes, 7 METs, 86% predicted heart rate   Stopped because of R leg claudication + ECG changes            Venous insufficiency of both lower extremities 06/04/2018    Localized edema 06/04/2018    Atherosclerosis of native artery of right lower extremity with intermittent claudication 04/13/2018    Atherosclerosis of native artery of both lower extremities with intermittent claudication 03/02/2018    Left knee pain 01/19/2016    Pain of left lower extremity 01/19/2016    Difficulty walking 01/19/2016    Acute pain of left knee 12/11/2015    Hyperlipidemia LDL goal <70 06/12/2015    DJD (degenerative joint disease), lumbar 05/27/2015    Lumbar facet arthropathy 05/27/2015    DDD (degenerative disc disease) 05/27/2015    Spondylosis without myelopathy 05/27/2015    Limb pain 11/21/2014    History of back injury 11/21/2014     20 years ago a bag fell on his back at work      Myalgia 11/21/2014    Claudication in peripheral vascular disease 06/13/2014    PAD (peripheral artery disease) 05/21/2014 6/13/2014      1. Three vessel runoff below the knee bilaterally.  2. Severe disease of the terminal aorta.  3. Successful PTAS.  4. Bilateral common iliac reconstruction with 8 x 39 mm stent extending in the aorta  5. Right common illiac was treated with an overlapping 9 x 60 mm SES   6. Left common iliac was treated with an overlapping 8 x 80 mm SES down to external iliac  7. All stents were post dilated with 9.0 x 60 mm balloons  8. Moderate bilateral SFA disease  9. Chronically occluded left internal iliac artery        6/12/2015      1. 80% mid left SFA with a 20 mmHg resting mean gradient  2. Atherectomy with 2.2 WhoisEDInix Volcano catheter  3. PTA with 6.0 x 100 mm Lutonix drug coated balloon        6/9/2017      Patent bilateral GRUPO/EIA stents  L EIA PTAS 9.0 x 80 mm Life stent  post dilated with 8.0 mm balloon  Bilateral 70-80% SFA stenosis with 3 vessel run off          3/2018      L SFA intervention for claudication   75% L SFA with 3 vessel run off   7 mmHg resting and 33 mmHg hyperemic mean gradient         L CFA antegrade access   PTA with 6.0 x 150 mm   PTA with 6.0 x 150 mm Lutonix   3 vessel run off           4/13/2018       S/p R SFA PTA for winsome IIb claudication   R CFA antegrade access         R CFA with 50% stenosis-heavily calcified lesion               Baseline /90         R SFA with 70% stenosis with a significant resting and hyperemic mean gradient     3 vessel run off             PTA of R SFA with 6.0 x 150 + 6.0 x 60 mm Lutonix DCB        5/2/2018   Patent arteries post revascularization        2/2019     R 0.84 to 0.27   L 0.90 to 0.66   After ambulation   Severe R calf claudication        Peripheral angiogram 5/10/2019                   95% R CFA stenosis; heavily calcified    Patent SFA, POP + 3 vessel run off      Peripheral intervention 7/12/2019    S/p right CFA revascularization for winsome IIb claudication       Assisted JOSY approach   L radial access for visualization   5-6 slender R PT access for delivery of therapy          Atherectomy with SilverHawk catheter   PTA with 7.0 x 40 mm Lutonix DCB            Atherosclerosis of leg with intermittent claudication 04/21/2014     Winsome IIB      Elevated TSH 05/29/2013    HTN (hypertension) 04/29/2013    Elevated fasting blood sugar 04/29/2013           Left Arm BP - Sitting: (P) 138/76        LAST HbA1c  Lab Results   Component Value Date    HGBA1C 5.7 (H) 10/28/2019       Lipid panel  Lab Results   Component Value Date    CHOL 187 10/28/2019    CHOL 186 03/29/2019    CHOL 183 10/07/2017     Lab Results   Component Value Date    HDL 50 10/28/2019    HDL 74 03/29/2019    HDL 51 10/07/2017     Lab Results   Component Value Date    LDLCALC 101.4 10/28/2019    LDLCALC 92.2 03/29/2019    LDLCALC 107.0  10/07/2017     Lab Results   Component Value Date    TRIG 178 (H) 10/28/2019    TRIG 99 03/29/2019    TRIG 125 10/07/2017     Lab Results   Component Value Date    CHOLHDL 26.7 10/28/2019    CHOLHDL 39.8 03/29/2019    CHOLHDL 27.9 10/07/2017            Review of Systems   Constitution: Positive for malaise/fatigue. Negative for chills and fever.   HENT: Negative for hearing loss and nosebleeds.    Eyes: Negative for blurred vision.   Cardiovascular: Negative for chest pain and palpitations.   Respiratory: Positive for shortness of breath. Negative for hemoptysis.    Hematologic/Lymphatic: Negative for bleeding problem.   Skin: Negative for itching.   Musculoskeletal: Negative for falls.   Gastrointestinal: Negative for abdominal pain and hematochezia.   Genitourinary: Negative for hematuria.   Neurological: Negative for dizziness and loss of balance.   Psychiatric/Behavioral: Negative for altered mental status and depression.       Objective:   Physical Exam   Constitutional: He is oriented to person, place, and time. He appears well-developed and well-nourished.   HENT:   Head: Normocephalic and atraumatic.   Eyes: Conjunctivae are normal.   Neck: Neck supple. Carotid bruit is not present.   Cardiovascular: Normal rate and regular rhythm. Exam reveals no gallop and no friction rub.   Murmur (grade II systolic) heard.  Pulmonary/Chest: Effort normal and breath sounds normal. No stridor. No respiratory distress. He has no wheezes.   Neurological: He is alert and oriented to person, place, and time.   Skin: Skin is warm and dry.   Psychiatric: He has a normal mood and affect. His behavior is normal.       Assessment:     1. Coronary artery disease involving native coronary artery of native heart with angina pectoris with documented spasm    2. Cardiac arrest    3. Bilateral carotid artery stenosis    4. PAD (peripheral artery disease)    5. Essential hypertension    6. Hyperlipidemia LDL goal <70        Plan:     -  Continue GDMT  - ASA/Plavix  - Cilostazole  - Anemia work up per primary team.   - Refer for cardiac rehab    - Continue with current medical plan and lifestyle changes.  Return sooner for concerns or questions. If symptoms persist go to the ED  I have reviewed all pertinent data including patient's medical history in detail and updated the computerized patient record.     Orders Placed This Encounter   Procedures    Cardiac Rehab Phase II     Standing Status:   Future     Standing Expiration Date:   11/4/2020     Order Specific Question:   Select qualifying diagnosis:     Answer:   Other - Specify     Order Specific Question:   Other (Specify):     Answer:   cardiac arrest, 95% stenosis lcx culprit.       Follow up as scheduled.     He expressed verbal understanding and agreed with the plan    Patient's Medications   New Prescriptions    No medications on file   Previous Medications    ALBUTEROL (PROVENTIL/VENTOLIN HFA) 90 MCG/ACTUATION INHALER    INHALE 2 PUFFS INTO THE LUNGS EVERY 6 HOURS AS NEEDED FOR WHEEZING    ASPIRIN (ASPIR-81 ORAL)    Take 1 tablet by mouth.    ATORVASTATIN (LIPITOR) 80 MG TABLET    Take 1 tablet (80 mg total) by mouth once daily.    CILOSTAZOL (PLETAL) 100 MG TAB    TAKE 1 TABLET(100 MG) BY MOUTH TWICE DAILY    CLOPIDOGREL (PLAVIX) 75 MG TABLET    Take 1 tablet (75 mg total) by mouth once daily.    COENZYME Q10 (COQ-10) 100 MG CAPSULE    Take 1 capsule (100 mg total) by mouth once daily.    FLUNISOLIDE 25 MCG, 0.025%, (NASALIDE) 25 MCG (0.025 %) SPRY    2 sprays by Nasal route 2 (two) times daily.    HYDROCHLOROTHIAZIDE (HYDRODIURIL) 25 MG TABLET    Take 1 tablet (25 mg total) by mouth once daily.    LOSARTAN (COZAAR) 100 MG TABLET    Take 1 tablet (100 mg total) by mouth once daily.    METOPROLOL SUCCINATE (TOPROL-XL) 25 MG 24 HR TABLET    TAKE 1 TABLET BY MOUTH EVERY DAY   Modified Medications    No medications on file   Discontinued Medications    No medications on file

## 2019-11-04 NOTE — PATIENT INSTRUCTIONS
Eating Heart-Healthy Foods  Eating has a big impact on your heart health. In fact, eating healthier can improve several of your heart risks at once. For instance, it helps you manage weight, cholesterol, and blood pressure. Here are ideas to help you make heart-healthy changes without giving up all the foods and flavors you love.  Getting started  · Talk with your health care provider about eating plans, such as the DASH or Mediterranean diet. You may also be referred to a dietitian.  · Change a few things at a time. Give yourself time to get used to a few eating changes before adding more.  · Work to create a tasty, healthy eating plan that you can stick to for the rest of your life.    Goals for healthy eating  Below are some tips to improve your eating habits:  · Limit saturated fats and trans fats. Saturated fats raise your levels of cholesterol, so keep these fats to a minimum. They are found in foods such as fatty meats, whole milk, cheese, and palm and coconut oils. Avoid trans fats because they lower good cholesterol as well as raise bad cholesterol. Trans fats are most often found in processed foods.  · Reduce sodium (salt) intake. Eating too much salt may increase your blood pressure. Limit your sodium intake to 2,300 milligrams (mg) per day, or less if your health care provider recommends it. Dining out less often and eating fewer processed foods are two great ways to decrease the amount of salt you consume.  · Managing calories. A calorie is a unit of energy. Your body burns calories for fuel, but if you eat more calories than your body burns, the extras are stored as fat. Your health care provider can help you create a diet plan to manage your calories. This will likely include eating healthier foods as well as exercising regularly. To help you track your progress, keep a diary to record what you eat and how often you exercise.  Choose the right foods  Aim to make these foods staples of your diet. If  you have diabetes, you may have different recommendations than what is listed here:  · Fruits and vegetable provide plenty of nutrients without a lot of calories. At meals, fill half your plate with these foods. Split the other half of your plate between whole grains and lean protein.  · Whole grains are high in fiber and rich in vitamins and nutrients. Good choices include whole-wheat bread, pasta, and brown rice.  · Lean proteins give you nutrition with less fat. Good choices include fish, skinless chicken, and beans.  · Low-fat or nonfat dairy provides nutrients without a lot of fat. Try low-fat or nonfat milk, cheese, or yogurt.  · Healthy fats can be good for you in small amounts. These are unsaturated fats, such as olive oil, nuts, and fish. Try to have at least 2 servings per week of fatty fish such as salmon, sardines, mackerel, rainbow trout, and albacore tuna. These contain omega-3 fatty acids, which are good for your heart. Flaxseed is another source of a heart-healthy fat.  More on heart healthy eating    Read food labels  Healthy eating starts at the grocery store. Be sure to pay attention to food labels on packaged foods. Look for products that are high in fiber and protein, and low in saturated fat, cholesterol, and sodium. Avoid products that contain trans fat. And pay close attention to serving size. For instance, if you plan to eat two servings, double all the numbers on the label.  Prepare food right  A key part of healthy cooking is cutting down on added fat and salt. Look on the internet for lower-fat, lower-sodium recipes. Also, try these tips:  · Remove fat from meat and skin from poultry before cooking.  · Skim fat from the surface of soups and sauces.  · Broil, boil, bake, steam, grill, and microwave food without added fats.  · Choose ingredients that spice up your food without adding calories, fat, or sodium. Try these items: horseradish, hot sauce, lemon, mustard, nonfat salad dressings,  and vinegar. For salt-free herbs and spices, try basil, cilantro, cinnamon, pepper, and rosemary.  Date Last Reviewed: 6/25/2015  © 6771-8524 [employer]. [address] [place] [address]. All rights reserved. This information is not intended as a substitute for professional medical care. Always follow your healthcare professional's instructions.          Aerobic Exercise for a Healthy Heart  Exercise is a lot more than an energy booster and a stress reliever. It also strengthens your heart muscle, lowers your blood pressure and cholesterol, and burns calories. It can also improve your resting muscle tone, and your mood.     Remember, some activity is better than none.    Choose an aerobic activity  Choose an activity that makes your heart and lungs work harder than they do when you rest or walk normally. This aerobic exercise can improve the way your heart and other muscles use oxygen. Make it fun by exercising with a friend and choosing an activity you enjoy. Here are some ideas:  · Walking  · Swimming  · Bicycling  · Stair climbing  · Dancing  · Jogging  · Gardening  Exercise regularly  If you havent been exercising regularly,  get your doctors OK first. Then start slowly.  Here are some tips:  · Begin exercising 3 times a week for 5 to 10 minutes at a time.  · When you feel comfortable, add a few minutes each session.  · Slowly build up to exercising 3 to 4 times each week. Each session should last for 40 minutes, on average, and involve moderate- to vigorous-intensity physical activity.  · If you have been given nitroglycerin, be sure to carry it when you exercise.  · If you get chest pain (angina) when youre exercising, stop what youre doing, take your nitroglycerin, and call your doctor.  Date Last Reviewed: 6/2/2016  © 4074-5250 [employer]. [address] [place] [address]. All rights reserved. This information is not intended as a substitute for  professional medical care. Always follow your healthcare professional's instructions.          Understanding Coronary Artery Disease (CAD)    To understand coronary artery disease (CAD), you need to know how your heart works. Your heart is a muscle that pumps blood throughout your body. To work right, your heart needs a steady supply of oxygen. It gets this oxygen from blood supplied by the coronary arteries.     Healthy artery   Healthy artery. When a coronary artery is healthy and has no blockages, blood flows through easily. Healthy arteries can easily supply the oxygen-rich blood your heart needs.     Damaged artery   Damaged artery. Coronary artery disease begins when damage to the artery lining leads to the buildup of fat-like substances and cholesterol along the artery wall. This is called plaque. This damage could be caused by things like high blood pressure or smoking. This plaque buildup begins to narrow the arteries carrying blood to the heart. This is called atherosclerosis,     Narrowed artery   Narrowed artery. As more plaque builds up, your artery has trouble supplying blood to your heart muscle when it needs it most, such as during exercise. You may not feel any symptoms when this happens. Or you may feel angina--pressure, tightness, achiness, or pain in your chest, jaw, neck, back, or arm.     Blocked artery   Blocked artery. A piece of plaque may break off and completely block the artery. Or a blood clot may plug the narrowed artery. When this happens, blood flow is blocked from reaching the heart. Without oxygen-rich blood, part of the heart muscle becomes damaged and stops working. You may feel crushing pressure or pain in or around your chest. This is a heart attack (acute myocardial infarction, or AMI) and is a medical emergency.     Date Last Reviewed: 3/28/2016  © 5453-2998 [employer]. [address] [place] [address], [place] [address]. All rights reserved. This information is not  intended as a substitute for professional medical care. Always follow your healthcare professional's instructions.

## 2019-11-08 ENCOUNTER — TELEPHONE (OUTPATIENT)
Dept: CARDIOLOGY | Facility: CLINIC | Age: 59
End: 2019-11-08

## 2019-11-19 DIAGNOSIS — I25.10 ATHEROSCLEROSIS OF NATIVE CORONARY ARTERY OF NATIVE HEART WITHOUT ANGINA PECTORIS: ICD-10-CM

## 2019-11-19 DIAGNOSIS — Z98.61 POSTSURGICAL PERCUTANEOUS TRANSLUMINAL CORONARY ANGIOPLASTY STATUS: Primary | ICD-10-CM

## 2019-11-19 DIAGNOSIS — Z13.1 ENCOUNTER FOR SCREENING EXAMINATION FOR IMPAIRED GLUCOSE REGULATION AND DIABETES MELLITUS: Primary | ICD-10-CM

## 2019-11-20 ENCOUNTER — PATIENT OUTREACH (OUTPATIENT)
Dept: ADMINISTRATIVE | Facility: OTHER | Age: 59
End: 2019-11-20

## 2019-11-21 ENCOUNTER — OFFICE VISIT (OUTPATIENT)
Dept: CARDIOLOGY | Facility: CLINIC | Age: 59
End: 2019-11-21
Payer: COMMERCIAL

## 2019-11-21 VITALS
HEART RATE: 70 BPM | DIASTOLIC BLOOD PRESSURE: 72 MMHG | HEIGHT: 70 IN | BODY MASS INDEX: 26.48 KG/M2 | WEIGHT: 185 LBS | SYSTOLIC BLOOD PRESSURE: 140 MMHG

## 2019-11-21 DIAGNOSIS — E78.5 HYPERLIPIDEMIA LDL GOAL <70: ICD-10-CM

## 2019-11-21 DIAGNOSIS — I25.111 CORONARY ARTERY DISEASE INVOLVING NATIVE CORONARY ARTERY OF NATIVE HEART WITH ANGINA PECTORIS WITH DOCUMENTED SPASM: Primary | ICD-10-CM

## 2019-11-21 DIAGNOSIS — I46.9 CARDIAC ARREST: ICD-10-CM

## 2019-11-21 DIAGNOSIS — I73.9 PAD (PERIPHERAL ARTERY DISEASE): ICD-10-CM

## 2019-11-21 DIAGNOSIS — I10 ESSENTIAL HYPERTENSION: ICD-10-CM

## 2019-11-21 DIAGNOSIS — I65.23 BILATERAL CAROTID ARTERY STENOSIS: ICD-10-CM

## 2019-11-21 PROCEDURE — 3008F BODY MASS INDEX DOCD: CPT | Mod: CPTII,S$GLB,, | Performed by: INTERNAL MEDICINE

## 2019-11-21 PROCEDURE — 99999 PR PBB SHADOW E&M-EST. PATIENT-LVL III: CPT | Mod: PBBFAC,,, | Performed by: INTERNAL MEDICINE

## 2019-11-21 PROCEDURE — 3077F PR MOST RECENT SYSTOLIC BLOOD PRESSURE >= 140 MM HG: ICD-10-PCS | Mod: CPTII,S$GLB,, | Performed by: INTERNAL MEDICINE

## 2019-11-21 PROCEDURE — 3077F SYST BP >= 140 MM HG: CPT | Mod: CPTII,S$GLB,, | Performed by: INTERNAL MEDICINE

## 2019-11-21 PROCEDURE — 3008F PR BODY MASS INDEX (BMI) DOCUMENTED: ICD-10-PCS | Mod: CPTII,S$GLB,, | Performed by: INTERNAL MEDICINE

## 2019-11-21 PROCEDURE — 3078F PR MOST RECENT DIASTOLIC BLOOD PRESSURE < 80 MM HG: ICD-10-PCS | Mod: CPTII,S$GLB,, | Performed by: INTERNAL MEDICINE

## 2019-11-21 PROCEDURE — 99999 PR PBB SHADOW E&M-EST. PATIENT-LVL III: ICD-10-PCS | Mod: PBBFAC,,, | Performed by: INTERNAL MEDICINE

## 2019-11-21 PROCEDURE — 99215 PR OFFICE/OUTPT VISIT, EST, LEVL V, 40-54 MIN: ICD-10-PCS | Mod: S$GLB,,, | Performed by: INTERNAL MEDICINE

## 2019-11-21 PROCEDURE — 3078F DIAST BP <80 MM HG: CPT | Mod: CPTII,S$GLB,, | Performed by: INTERNAL MEDICINE

## 2019-11-21 PROCEDURE — 99215 OFFICE O/P EST HI 40 MIN: CPT | Mod: S$GLB,,, | Performed by: INTERNAL MEDICINE

## 2019-11-21 NOTE — PATIENT INSTRUCTIONS

## 2019-11-21 NOTE — PROGRESS NOTES
Subjective:   Patient ID:  Domingo Gross Sr. is a 59 y.o. male who presents for follow up of Coronary Artery Disease; Hyperlipidemia; Hypertension; Peripheral Arterial Disease; and s/p cardiac arrest      HPI:     Domingo Gross Sr. 59 y.o. male is here follow up CAD, PAD without claudication, after bilateral SFA, GRUPO, EIA, and R CFA. Revascularization.        Angina resolved after multivessel PCI with REZA (3/2019)        ELISABETH with stress 5/2017:   R 1.01 to 0.44   L 0.92 to 0.45    After 6 minutes ambulation      CTA 5/2017:       Patent distal aorta and bilateral GRUPO/EIA stents   L EIA with de shawna 90% stenosis   L SFA 80% with 3 vessel run off     R EIA/CFA with moderate disease   R SFA 80% stenosis with 3 vessel run off        Peripheral angiogram with intervention 6/2017         Patent bilateral GRUPO/EIA stents.    De shawna 80% left EIA stenosis treated with 9.0 x 80 mm Lifestar  stent post dilated with 8.0 mm balloon.    Bilateral 70-80% SFA stenosis with 3 vessel run off.        3/2018      L SFA intervention for claudication   75% L SFA with 3 vessel run off   7 mmHg resting and 33 mmHg hyperemic mean gradient         L CFA antegrade access   PTA with 6.0 x 150 mm   PTA with 6.0 x 150 mm Lutonix   3 vessel run off           4/13/2018       S/p R SFA PTA for winsome IIb claudication   R CFA antegrade access         R CFA with 50% stenosis-heavily calcified lesion               Baseline /90         R SFA with 70% stenosis with a significant resting and hyperemic mean gradient     3 vessel run off             PTA of R SFA with 6.0 x 150 + 6.0 x 60 mm Lutonix DCB        5/2/2018   Patent arteries post revascularization        2/2019     R 0.84 to 0.27   L 0.90 to 0.66   After ambulation   Severe R calf claudication          Nuclear stress test 2/2019     + ECG with 2 mm ST depression   No wall motion abnormalities   Test stopped at 6 minutes, 7 METs, 86% predicted heart rate   Stopped because of R  leg claudication + ECG changes          S/p PCI RCA and LAD with REZA 3/2019       RCA 3.5 x 22 mm Resolute REZA   LAD 2.5 x 30 and 2.5 x 25 mm Resolute REZA      Peripheral aortogram 5/10/2019     R CFA 95% stenosis   3 vessel run off        Seen by Dr. Giron for R CFA endarterectomy but preferable should be off plavix for at least 5 days. He has a risk for stent thrombosis with premature interruption of DAPT. He later had R CFA atherectomy + PTA with DCB.       He was admitted 10/4/2019 for cardiac arrest. He was fully rescued. Initial rhythm was afib with rvr. He was not taken immediately to the cath lab because of ARF + hypokalemia. He had a diagnostic angiogram which revealed patent LM, LAD, RCA stent, and new ostial LCX lesion 99% with R to L collaterals. He had PCI with REZA after his renal function returned to baseline.       10/11/2019 for cardiac arrest        S/p staged LCX PCI                    L CFA access              IVUS guided PCI              3.0 x 15 mm Resolute REZA post dilated with 3.5 NC balloon                     Patient Active Problem List    Diagnosis Date Noted    Cardiac arrest 10/04/2019     Priority: High    Atherosclerosis of native artery of both lower extremities with intermittent claudication 03/02/2018     Priority: High    Atherosclerosis of native artery of right lower extremity with intermittent claudication 04/13/2018     Priority: Low    Atrial fibrillation with RVR 10/04/2019    Syncope 10/04/2019    Hypokalemia 10/04/2019    Acute renal failure 10/04/2019    Bilateral carotid artery stenosis     Coronary artery disease involving native coronary artery of native heart with angina pectoris with documented spasm 03/29/2019         3/29/2019    S/p LHC via R radial           LM, LAD, and LCX are patent with luminal irregularities  RCA mid 95% calcified lesion  Normal EF with LVEDP 8 mmHg           PCI of mid LAD with 2.5 x 30 and 2.5 x 15 mm Resolute REZA  PCI of  proximal RCA to treat guide dissection with 3.5 x 22 Resolute REZA        10/11/2019 for cardiac arrest        S/p staged LCX PCI                    L CFA access              IVUS guided PCI              3.0 x 15 mm Resolute REZA post dilated with 3.5 NC balloon           Abnormal cardiovascular stress test 02/27/2019         Nuclear stress test 2/2019     + ECG with 2 mm ST depression   No wall motion abnormalities   Test stopped at 6 minutes, 7 METs, 86% predicted heart rate   Stopped because of R leg claudication + ECG changes            Venous insufficiency of both lower extremities 06/04/2018    Localized edema 06/04/2018    Left knee pain 01/19/2016    Pain of left lower extremity 01/19/2016    Difficulty walking 01/19/2016    Acute pain of left knee 12/11/2015    Hyperlipidemia LDL goal <70 06/12/2015    DJD (degenerative joint disease), lumbar 05/27/2015    Lumbar facet arthropathy 05/27/2015    DDD (degenerative disc disease) 05/27/2015    Spondylosis without myelopathy 05/27/2015    Limb pain 11/21/2014    History of back injury 11/21/2014     20 years ago a bag fell on his back at work      Myalgia 11/21/2014    Claudication in peripheral vascular disease 06/13/2014    PAD (peripheral artery disease) 05/21/2014 6/13/2014      1. Three vessel runoff below the knee bilaterally.  2. Severe disease of the terminal aorta.  3. Successful PTAS.  4. Bilateral common iliac reconstruction with 8 x 39 mm stent extending in the aorta  5. Right common illiac was treated with an overlapping 9 x 60 mm SES   6. Left common iliac was treated with an overlapping 8 x 80 mm SES down to external iliac  7. All stents were post dilated with 9.0 x 60 mm balloons  8. Moderate bilateral SFA disease  9. Chronically occluded left internal iliac artery        6/12/2015      1. 80% mid left SFA with a 20 mmHg resting mean gradient  2. Atherectomy with 2.2 ganttonix Volcano catheter  3. PTA with 6.0 x 100 mm Lutonix  drug coated balloon        2017      Patent bilateral GRUPO/EIA stents  L EIA PTAS 9.0 x 80 mm Life stent post dilated with 8.0 mm balloon  Bilateral 70-80% SFA stenosis with 3 vessel run off          3/2018      L SFA intervention for claudication   75% L SFA with 3 vessel run off   7 mmHg resting and 33 mmHg hyperemic mean gradient         L CFA antegrade access   PTA with 6.0 x 150 mm   PTA with 6.0 x 150 mm Lutonix   3 vessel run off           2018       S/p R SFA PTA for winsome IIb claudication   R CFA antegrade access         R CFA with 50% stenosis-heavily calcified lesion               Baseline /90         R SFA with 70% stenosis with a significant resting and hyperemic mean gradient     3 vessel run off             PTA of R SFA with 6.0 x 150 + 6.0 x 60 mm Lutonix DCB        2018   Patent arteries post revascularization        2019     R 0.84 to 0.27   L 0.90 to 0.66   After ambulation   Severe R calf claudication        Peripheral angiogram 5/10/2019                   95% R CFA stenosis; heavily calcified    Patent SFA, POP + 3 vessel run off      Peripheral intervention 2019    S/p right CFA revascularization for winsome IIb claudication       Assisted JOSY approach   L radial access for visualization   5-6 slender R PT access for delivery of therapy          Atherectomy with SilverHawk catheter   PTA with 7.0 x 40 mm Lutonix DCB            Atherosclerosis of leg with intermittent claudication 2014     Winsome IIB      Elevated TSH 2013    HTN (hypertension) 2013    Elevated fasting blood sugar 2013           Right Arm BP - Sittin/72  Left Arm BP - Sittin/72          LAST HbA1c  Lab Results   Component Value Date    HGBA1C 5.7 (H) 10/28/2019       Lipid panel  Lab Results   Component Value Date    CHOL 187 10/28/2019    CHOL 186 2019    CHOL 183 10/07/2017     Lab Results   Component Value Date    HDL 50 10/28/2019    HDL 74  03/29/2019    HDL 51 10/07/2017     Lab Results   Component Value Date    LDLCALC 101.4 10/28/2019    LDLCALC 92.2 03/29/2019    LDLCALC 107.0 10/07/2017     Lab Results   Component Value Date    TRIG 178 (H) 10/28/2019    TRIG 99 03/29/2019    TRIG 125 10/07/2017     Lab Results   Component Value Date    CHOLHDL 26.7 10/28/2019    CHOLHDL 39.8 03/29/2019    CHOLHDL 27.9 10/07/2017            Review of Systems   Constitution: Negative for diaphoresis, night sweats, weight gain and weight loss.   HENT: Negative for congestion.    Eyes: Negative for blurred vision, discharge and double vision.   Cardiovascular: Negative for chest pain, claudication, cyanosis, dyspnea on exertion, irregular heartbeat, leg swelling, near-syncope, orthopnea, palpitations, paroxysmal nocturnal dyspnea and syncope.   Respiratory: Negative for cough, shortness of breath and wheezing.    Endocrine: Negative for cold intolerance, heat intolerance and polyphagia.   Hematologic/Lymphatic: Negative for adenopathy and bleeding problem. Does not bruise/bleed easily.   Skin: Negative for dry skin and nail changes.   Musculoskeletal: Negative for arthritis, back pain, falls, joint pain, myalgias and neck pain.   Gastrointestinal: Negative for bloating, abdominal pain, change in bowel habit and constipation.   Genitourinary: Negative for bladder incontinence, dysuria, flank pain, genital sores and missed menses.   Neurological: Negative for aphonia, brief paralysis, difficulty with concentration, dizziness and weakness.   Psychiatric/Behavioral: Negative for altered mental status and memory loss. The patient does not have insomnia.    Allergic/Immunologic: Negative for environmental allergies.       Objective:   Physical Exam   Constitutional: He is oriented to person, place, and time. He appears well-developed and well-nourished. He is not intubated.   HENT:   Head: Normocephalic and atraumatic.   Right Ear: External ear normal.   Left Ear: External  ear normal.   Mouth/Throat: Oropharynx is clear and moist.   Eyes: Pupils are equal, round, and reactive to light. Conjunctivae and EOM are normal. Right eye exhibits no discharge. Left eye exhibits no discharge. No scleral icterus.   Neck: Normal range of motion. Neck supple. Normal carotid pulses, no hepatojugular reflux and no JVD present. Carotid bruit is not present. No tracheal deviation present. No thyromegaly present.   Cardiovascular: Normal rate, regular rhythm, S1 normal and S2 normal.  No extrasystoles are present. PMI is not displaced. Exam reveals no gallop, no S3, no distant heart sounds, no friction rub and no midsystolic click.   No murmur heard.  Pulses:       Carotid pulses are 2+ on the right side, and 2+ on the left side.       Radial pulses are 2+ on the right side, and 2+ on the left side.        Femoral pulses are 2+ on the right side, and 2+ on the left side.       Popliteal pulses are 2+ on the right side, and 2+ on the left side.        Dorsalis pedis pulses are 1+ on the right side, and 2+ on the left side.        Posterior tibial pulses are 1+ on the right side, and 2+ on the left side.         Triphasic bilateral DP and PT doppler signals           Pulmonary/Chest: Effort normal and breath sounds normal. No accessory muscle usage or stridor. No apnea, no tachypnea and no bradypnea. He is not intubated. No respiratory distress. He has no decreased breath sounds. He has no wheezes. He has no rales. He exhibits no tenderness and no bony tenderness.   Abdominal: He exhibits no distension, no pulsatile liver, no abdominal bruit, no ascites, no pulsatile midline mass and no mass. There is no tenderness. There is no rebound and no guarding.   Musculoskeletal: Normal range of motion. He exhibits no edema or tenderness.   Lymphadenopathy:     He has no cervical adenopathy.   Neurological: He is alert and oriented to person, place, and time. He has normal reflexes. No cranial nerve deficit.  Coordination normal.   Skin: Skin is warm. No rash noted. No erythema. No pallor.   Psychiatric: He has a normal mood and affect. His behavior is normal. Judgment and thought content normal.       Assessment:     1. Coronary artery disease involving native coronary artery of native heart with angina pectoris with documented spasm    2. PAD (peripheral artery disease)    3. Hyperlipidemia LDL goal <70    4. Essential hypertension    5. Cardiac arrest    6. Bilateral carotid artery stenosis        Plan:         Medical therapy for CAD + PAD  Cardiac rehab II  DAPT with aspirin + plavix  Intense statin therapy  Arb  Pletal       Target BP < 130/80 mmHg  Target LDL < 70        Will switch from lipitor to crestor 40 mg qhs       Continue with current medical plan and lifestyle changes.  Return sooner for concerns or questions. If symptoms persist go to the ED  I have reviewed all pertinent data on this patient       Follow up as scheduled. Return sooner for concerns or questions            He expressed verbal understanding and agreed with the plan        Greater than 50% of the visit of 45 minutes was spent counseling, educating, and coordinating the care of the patient.        Greater than 50% of the visit of 45 minutes was spent counseling, educating, and coordinating the care of the patient.      -In today's visit, at least 4 established conditions that pose a risk to life or bodily function have been addressed and the conditions are severe.    -In today's visit, monitoring for drug toxicity was accomplished.              Patient's Medications   New Prescriptions    No medications on file   Previous Medications    ALBUTEROL (PROVENTIL/VENTOLIN HFA) 90 MCG/ACTUATION INHALER    INHALE 2 PUFFS INTO THE LUNGS EVERY 6 HOURS AS NEEDED FOR WHEEZING    ASPIRIN (ASPIR-81 ORAL)    Take 1 tablet by mouth.    ATORVASTATIN (LIPITOR) 80 MG TABLET    Take 1 tablet (80 mg total) by mouth once daily.    CILOSTAZOL (PLETAL) 100 MG TAB     TAKE 1 TABLET(100 MG) BY MOUTH TWICE DAILY    CLOPIDOGREL (PLAVIX) 75 MG TABLET    Take 1 tablet (75 mg total) by mouth once daily.    COENZYME Q10 (COQ-10) 100 MG CAPSULE    Take 1 capsule (100 mg total) by mouth once daily.    FLUNISOLIDE 25 MCG, 0.025%, (NASALIDE) 25 MCG (0.025 %) SPRY    2 sprays by Nasal route 2 (two) times daily.    HYDROCHLOROTHIAZIDE (HYDRODIURIL) 25 MG TABLET    Take 1 tablet (25 mg total) by mouth once daily.    LOSARTAN (COZAAR) 100 MG TABLET    Take 1 tablet (100 mg total) by mouth once daily.    METOPROLOL SUCCINATE (TOPROL-XL) 25 MG 24 HR TABLET    TAKE 1 TABLET BY MOUTH EVERY DAY   Modified Medications    No medications on file   Discontinued Medications    No medications on file

## 2019-11-21 NOTE — PROGRESS NOTES
HISTORY: S/P PTCA/STENT (10-11-19), CAD, A.FIB, HX OF ARREST, HTN, HLP, PAD, EF=60%(10-5-19)    ANTHROPOMETRICS:     PRE   Abdominal girth (in) 40.5   Height (in) 70   Weight (lbs) 189   BMI 27.1   % Body Fat 17.6%       EXERCISE RESULTS:     PRE   Peak VO2 (CPX only) 17.3   Actual METS (CPX only) 4.9   Estimated METS 9.0       LAB RESULTS:    Lab Results   Component Value Date    MPV 9.9 11/25/2019       Lab Results   Component Value Date    CHOL 211 (H) 11/25/2019     Lab Results   Component Value Date    HDL 53 11/25/2019     Lab Results   Component Value Date    LDLCALC 123.8 11/25/2019     Lab Results   Component Value Date    TRIG 171 (H) 11/25/2019     Lab Results   Component Value Date    CHOLHDL 25.1 11/25/2019       Lab Results   Component Value Date    GLUF 102 11/25/2019     Lab Results   Component Value Date    HGBA1C 5.7 (H) 10/28/2019        Lab Results   Component Value Date    HSCRP 1.40 11/25/2019         FELY SCORES:     PRE   Anxiety 2   Depression 0   Somatic 2   Hostility 4     SF-36 SCORES:     PRE   Physical Function 13   Social Function 11   Mental Health 27   Pain 9   Change in Health 1   Physical Role Limitation 2   Mental Role Limitation 3   Energy/Fatigue 8   Health Perceptions 19   Total Score 93     PHQ-9:     PRE   PHQ-9 6       EDUCATION SCORES:     PRE   Education Score 70

## 2019-11-25 ENCOUNTER — PATIENT MESSAGE (OUTPATIENT)
Dept: ADMINISTRATIVE | Facility: OTHER | Age: 59
End: 2019-11-25

## 2019-11-25 ENCOUNTER — HOSPITAL ENCOUNTER (OUTPATIENT)
Dept: CARDIOLOGY | Facility: CLINIC | Age: 59
Discharge: HOME | End: 2019-11-25
Attending: INTERNAL MEDICINE
Payer: COMMERCIAL

## 2019-11-25 ENCOUNTER — LAB VISIT (OUTPATIENT)
Dept: LAB | Facility: HOSPITAL | Age: 59
End: 2019-11-25
Attending: INTERNAL MEDICINE
Payer: COMMERCIAL

## 2019-11-25 ENCOUNTER — TELEPHONE (OUTPATIENT)
Dept: CARDIOLOGY | Facility: CLINIC | Age: 59
End: 2019-11-25

## 2019-11-25 VITALS — HEIGHT: 70 IN | WEIGHT: 189.13 LBS | BODY MASS INDEX: 27.08 KG/M2

## 2019-11-25 DIAGNOSIS — I25.10 ATHEROSCLEROSIS OF NATIVE CORONARY ARTERY OF NATIVE HEART WITHOUT ANGINA PECTORIS: ICD-10-CM

## 2019-11-25 DIAGNOSIS — Z98.61 POSTSURGICAL PERCUTANEOUS TRANSLUMINAL CORONARY ANGIOPLASTY STATUS: ICD-10-CM

## 2019-11-25 DIAGNOSIS — Z13.1 ENCOUNTER FOR SCREENING EXAMINATION FOR IMPAIRED GLUCOSE REGULATION AND DIABETES MELLITUS: ICD-10-CM

## 2019-11-25 LAB
BASOPHILS # BLD AUTO: 0.06 K/UL (ref 0–0.2)
BASOPHILS NFR BLD: 1 % (ref 0–1.9)
CHOLEST SERPL-MCNC: 211 MG/DL (ref 120–199)
CHOLEST/HDLC SERPL: 4 {RATIO} (ref 2–5)
CRP SERPL-MCNC: 1.4 MG/L (ref 0–3.19)
CV STRESS BASE HR: 72 BPM
DIASTOLIC BLOOD PRESSURE: 87 MMHG
DIFFERENTIAL METHOD: ABNORMAL
EOSINOPHIL # BLD AUTO: 0.3 K/UL (ref 0–0.5)
EOSINOPHIL NFR BLD: 4.6 % (ref 0–8)
ERYTHROCYTE [DISTWIDTH] IN BLOOD BY AUTOMATED COUNT: 13.1 % (ref 11.5–14.5)
GLUCOSE SERPL-MCNC: 102 MG/DL (ref 70–110)
HCT VFR BLD AUTO: 33.8 % (ref 40–54)
HDLC SERPL-MCNC: 53 MG/DL (ref 40–75)
HDLC SERPL: 25.1 % (ref 20–50)
HGB BLD-MCNC: 10.7 G/DL (ref 14–18)
IMM GRANULOCYTES # BLD AUTO: 0.01 K/UL (ref 0–0.04)
IMM GRANULOCYTES NFR BLD AUTO: 0.2 % (ref 0–0.5)
LDLC SERPL CALC-MCNC: 123.8 MG/DL (ref 63–159)
LYMPHOCYTES # BLD AUTO: 1.6 K/UL (ref 1–4.8)
LYMPHOCYTES NFR BLD: 26.3 % (ref 18–48)
MCH RBC QN AUTO: 26.8 PG (ref 27–31)
MCHC RBC AUTO-ENTMCNC: 31.7 G/DL (ref 32–36)
MCV RBC AUTO: 85 FL (ref 82–98)
MONOCYTES # BLD AUTO: 0.5 K/UL (ref 0.3–1)
MONOCYTES NFR BLD: 7.9 % (ref 4–15)
NEUTROPHILS # BLD AUTO: 3.6 K/UL (ref 1.8–7.7)
NEUTROPHILS NFR BLD: 60 % (ref 38–73)
NONHDLC SERPL-MCNC: 158 MG/DL
NRBC BLD-RTO: 0 /100 WBC
OHS CV CPX 1 MINUTE RECOVERY HEART RATE: 114 BPM
OHS CV CPX 85 PERCENT MAX PREDICTED HEART RATE MALE: 138
OHS CV CPX ABDOMINAL GIRTH: 40.5 CM
OHS CV CPX ANAEROBIC THRESHOLD DIASTOLIC BLOOD PRESSURE: 77 MMHG
OHS CV CPX ANAEROBIC THRESHOLD HEART RATE: 114
OHS CV CPX ANAEROBIC THRESHOLD RATE PRESSURE PRODUCT: NORMAL
OHS CV CPX ANAEROBIC THRESHOLD SYSTOLIC BLOOD PRESSURE: 167
OHS CV CPX DATA GRADE - AT: 9.9
OHS CV CPX DATA GRADE - PEAK: 11.5
OHS CV CPX DATA O2 SAT - PEAK: 96
OHS CV CPX DATA O2 SAT - REST: 98
OHS CV CPX DATA SPEED - AT: 2.9
OHS CV CPX DATA SPEED - PEAK: 3.3
OHS CV CPX DATA TIME - AT: 4.86
OHS CV CPX DATA TIME - PEAK: 5.62
OHS CV CPX DATA VE/VCO2 - AT: 32
OHS CV CPX DATA VE/VCO2 - PEAK: 34
OHS CV CPX DATA VE/VO2 - AT: 32
OHS CV CPX DATA VE/VO2 - PEAK: 35
OHS CV CPX DATA VO2 - AT: 14.8
OHS CV CPX DATA VO2 - PEAK: 17.3
OHS CV CPX DATA VO2 - REST: 3.2
OHS CV CPX ESTIMATED METS: 9
OHS CV CPX FEV1/FVC: 0.62
OHS CV CPX FORCED EXPIRATORY VOLUME: 2.4
OHS CV CPX FORCED VITAL CAPACITY (FVC): 3.9
OHS CV CPX HIGHEST VO: 30.3
OHS CV CPX MAX PREDICTED HEART RATE: 162
OHS CV CPX MAXIMAL VOLUNTARY VENTILATION (MVV) PREDICTED: 96
OHS CV CPX MAXIMAL VOLUNTARY VENTILATION (MVV): 75
OHS CV CPX MAXIUMUM EXERCISE VENTILATION (VE MAX): 51
OHS CV CPX PATIENT AGE: 58
OHS CV CPX PATIENT HEIGHT IN: 70
OHS CV CPX PATIENT IS FEMALE AGE 11-19: 0
OHS CV CPX PATIENT IS FEMALE AGE GREATER THAN 19: 0
OHS CV CPX PATIENT IS FEMALE AGE LESS THAN 11: 0
OHS CV CPX PATIENT IS FEMALE: 0
OHS CV CPX PATIENT IS MALE AGE 11-25: 0
OHS CV CPX PATIENT IS MALE AGE GREATER THAN 25: 1
OHS CV CPX PATIENT IS MALE AGE LESS THAN 11: 0
OHS CV CPX PATIENT IS MALE GREATER THAN 18: 1
OHS CV CPX PATIENT IS MALE LESS THAN OR EQUAL TO 18: 0
OHS CV CPX PATIENT IS MALE: 1
OHS CV CPX PATIENT WEIGHT RETURNED IN OZ: 3026.47
OHS CV CPX PEAK DIASTOLIC BLOOD PRESSURE: 91 MMHG
OHS CV CPX PEAK HEAR RATE: 133 BPM
OHS CV CPX PEAK RATE PRESSURE PRODUCT: NORMAL
OHS CV CPX PEAK SYSTOLIC BLOOD PRESSURE: 176 MMHG
OHS CV CPX PERCENT BODY FAT: 17.6
OHS CV CPX PERCENT MAX PREDICTED HEART RATE ACHIEVED: 82
OHS CV CPX PREDICTED VO2: 30.3 ML/KG/MIN
OHS CV CPX RATE PRESSURE PRODUCT PRESENTING: NORMAL
OHS CV CPX REST PET CO2: 33
OHS CV CPX VE/VCO2 SLOPE: 29.3
PLATELET # BLD AUTO: 297 K/UL (ref 150–350)
PMV BLD AUTO: 9.9 FL (ref 9.2–12.9)
RBC # BLD AUTO: 4 M/UL (ref 4.6–6.2)
STRESS ECHO POST EXERCISE DUR MIN: 5 MINUTES
STRESS ECHO POST EXERCISE DUR SEC: 37 SECONDS
STRESS ST DEPRESSION: 1.5 MM
SYSTOLIC BLOOD PRESSURE: 171 MMHG
TRIGL SERPL-MCNC: 171 MG/DL (ref 30–150)
WBC # BLD AUTO: 5.93 K/UL (ref 3.9–12.7)

## 2019-11-25 PROCEDURE — 85025 COMPLETE CBC W/AUTO DIFF WBC: CPT

## 2019-11-25 PROCEDURE — 94621 CARDIOPULMONARY EXERCISE TESTING (CUPID ONLY): ICD-10-PCS | Mod: 26,,, | Performed by: INTERNAL MEDICINE

## 2019-11-25 PROCEDURE — 82947 ASSAY GLUCOSE BLOOD QUANT: CPT

## 2019-11-25 PROCEDURE — 36415 COLL VENOUS BLD VENIPUNCTURE: CPT

## 2019-11-25 PROCEDURE — 80061 LIPID PANEL: CPT

## 2019-11-25 PROCEDURE — 94621 CARDIOPULM EXERCISE TESTING: CPT | Mod: 26,,, | Performed by: INTERNAL MEDICINE

## 2019-11-25 PROCEDURE — 94621 CARDIOPULM EXERCISE TESTING: CPT

## 2019-11-25 PROCEDURE — 86141 C-REACTIVE PROTEIN HS: CPT

## 2019-11-25 NOTE — TELEPHONE ENCOUNTER
----- Message from Arlene Butt CEP sent at 11/25/2019 12:47 PM CST -----  The patient had a CPX stress test 11/25 for entry to Phase II cardiac rehab.  His test was read positive.  In order for us to proceed, patient needs to be cleared by a cardiologist.  Please send a response on how you would like us to proceed.  Thank you.

## 2019-12-02 ENCOUNTER — TELEPHONE (OUTPATIENT)
Dept: CARDIAC REHAB | Facility: CLINIC | Age: 59
End: 2019-12-02

## 2019-12-03 ENCOUNTER — CLINICAL SUPPORT (OUTPATIENT)
Dept: CARDIAC REHAB | Facility: CLINIC | Age: 59
End: 2019-12-03
Payer: COMMERCIAL

## 2019-12-03 VITALS
DIASTOLIC BLOOD PRESSURE: 90 MMHG | RESPIRATION RATE: 16 BRPM | SYSTOLIC BLOOD PRESSURE: 160 MMHG | OXYGEN SATURATION: 97 % | HEART RATE: 78 BPM

## 2019-12-03 DIAGNOSIS — I46.9 CARDIAC ARREST: ICD-10-CM

## 2019-12-03 DIAGNOSIS — I25.111 CORONARY ARTERY DISEASE INVOLVING NATIVE CORONARY ARTERY OF NATIVE HEART WITH ANGINA PECTORIS WITH DOCUMENTED SPASM: ICD-10-CM

## 2019-12-03 DIAGNOSIS — Z98.61 POSTSURGICAL PERCUTANEOUS TRANSLUMINAL CORONARY ANGIOPLASTY STATUS: ICD-10-CM

## 2019-12-03 PROCEDURE — 93798 PR CARDIAC REHAB/MONITOR: ICD-10-PCS | Mod: S$GLB,,, | Performed by: INTERNAL MEDICINE

## 2019-12-03 PROCEDURE — 99999 PR PBB SHADOW E&M-EST. PATIENT-LVL III: CPT | Mod: PBBFAC,,,

## 2019-12-03 PROCEDURE — 99999 PR PBB SHADOW E&M-EST. PATIENT-LVL III: ICD-10-PCS | Mod: PBBFAC,,,

## 2019-12-03 PROCEDURE — 93798 PHYS/QHP OP CAR RHAB W/ECG: CPT | Mod: S$GLB,,, | Performed by: INTERNAL MEDICINE

## 2019-12-03 NOTE — PROGRESS NOTES
Domingo Watts Renato Dickerson., a 59 y.o. male, is here for nutrition counseling and education pertinent to his diagnosis of   1. Coronary artery disease involving native coronary artery of native heart with angina pectoris with documented spasm    2. Cardiac arrest        Patient will be starting Phase II Cardiac Rehabilitation.     NUTRITION ASSESSMENT:    Anthropometrics:  · Height: 5'10  · Weight: 189 lbs  · BMI:  27.1  · Abdominal girth: 40.5 inches  · Body fat: 17.6%      Drug Allergies and Intolerances:  Review of patient's allergies indicates:   Allergen Reactions    Ace inhibitors Rash       Food Allergies and Intolerances:  none    Past Medical History:  Past Medical History:   Diagnosis Date    Allergy     Arthritis     Coronary artery disease     Heart attack 10/04/2019    Hemothorax     Hyperlipidemia     Hypertension     PAD (peripheral artery disease)        Past Surgical History:  Past Surgical History:   Procedure Laterality Date    ABDOMINAL AORTOGRAPHY N/A 5/10/2019    Procedure: AORTOGRAM-ABDOMINAL;  Surgeon: Keo Jenkins MD;  Location: [facility] CATH LAB/EP;  Service: Cardiology;  Laterality: N/A;  RSFA intervention     CARDIAC CATHETERIZATION      CORONARY ANGIOGRAPHY N/A 3/29/2019    Procedure: ANGIOGRAM, CORONARY ARTERY;  Surgeon: Keo Jenkins MD;  Location: [facility] CATH LAB/EP;  Service: Cardiology;  Laterality: N/A;    CORONARY ANGIOGRAPHY Left 10/11/2019    Procedure: ANGIOGRAM, CORONARY ARTERY;  Surgeon: Keo Jenkins MD;  Location: [facility] CATH LAB/EP;  Service: Cardiology;  Laterality: Left;    CORONARY ANGIOPLASTY WITH STENT PLACEMENT  03/29/2019    mid and distal RCA    EYE SURGERY      LEFT HEART CATHETERIZATION N/A 3/29/2019    Procedure: Left heart cath;  Surgeon: Keo Jenkins MD;  Location: [facility] CATH LAB/EP;  Service: Cardiology;  Laterality: N/A;    LEFT HEART CATHETERIZATION Left 10/8/2019    Procedure: Left heart cath;  Surgeon: Keo Jenkins MD;  Location: [facility] CATH  LAB/EP;  Service: Cardiology;  Laterality: Left;    PERCUTANEOUS TRANSLUMINAL ANGIOPLASTY (PTA) OF PERIPHERAL VESSEL N/A 7/12/2019    Procedure: PTA, PERIPHERAL VESSEL;  Surgeon: Keo Jenkins MD;  Location: [facility] CATH LAB/EP;  Service: Cardiology;  Laterality: N/A;       Medications:  Current Outpatient Medications   Medication Sig    albuterol (PROVENTIL/VENTOLIN HFA) 90 mcg/actuation inhaler INHALE 2 PUFFS INTO THE LUNGS EVERY 6 HOURS AS NEEDED FOR WHEEZING    ASPIRIN (ASPIR-81 ORAL) Take 1 tablet by mouth.    atorvastatin (LIPITOR) 80 MG tablet Take 1 tablet (80 mg total) by mouth once daily.    cilostazol (PLETAL) 100 MG Tab TAKE 1 TABLET(100 MG) BY MOUTH TWICE DAILY    clopidogrel (PLAVIX) 75 mg tablet Take 1 tablet (75 mg total) by mouth once daily.    flunisolide 25 mcg, 0.025%, (NASALIDE) 25 mcg (0.025 %) Spry 2 sprays by Nasal route 2 (two) times daily.    hydroCHLOROthiazide (HYDRODIURIL) 25 MG tablet Take 1 tablet (25 mg total) by mouth once daily.    losartan (COZAAR) 100 MG tablet Take 1 tablet (100 mg total) by mouth once daily.    metoprolol succinate (TOPROL-XL) 25 MG 24 hr tablet TAKE 1 TABLET BY MOUTH EVERY DAY    coenzyme Q10 (COQ-10) 100 mg capsule Take 1 capsule (100 mg total) by mouth once daily.     No current facility-administered medications for this visit.        Vitamins and Supplements:  CoQ10    Labs:  Labs reviewed with patient. Patient confirms he is taking Lipitor for cholesterol control.    Lab Results   Component Value Date    CHOL 211 (H) 11/25/2019     Lab Results   Component Value Date    HDL 53 11/25/2019     Lab Results   Component Value Date    LDLCALC 123.8 11/25/2019     Lab Results   Component Value Date    TRIG 171 (H) 11/25/2019     Lab Results   Component Value Date    CHOLHDL 25.1 11/25/2019         Lab Results   Component Value Date    GLUF 102 11/25/2019     Lab Results   Component Value Date    HGBA1C 5.7 (H) 10/28/2019       Nutrition/Diet  History:  Patient eats 2-3 meals daily.  Seasons food with spices, pepper, no salt.  denies use of a salt shaker at the table on prepared foods. Dines out 6 per week at restaurants such as work cafeteria.  Chooses fried foods 1 times per month.  Chooses fish 1 times per week.   Beverages:  Coffee, soda (1x/day)  Alcohol: nonsmoker, no alcohol    Typical Week:  · Breakfast: 2 eggs, coffee  · Lunch: pasta or whatever is on the lunch bar at work   · Dinner: some type of meat - turkey, beef, chicken with potatoes and/or cornbread  · Snacks: chips -  1x/day    Difficulty Chewing or Swallowing: no  Current Exercise: See Exercise Physiologist Note  Food Safety/Food Preparation: self, spouse  Living Arrangements/Family Support: Lives with family  Cultural/Spiritual/Personal Preferences: none identified  Barriers to Education: none identified  Stage of Change Related to Diet Habits: Patient is contemplation - ambivalent about change     NUTRITION DIAGNOSIS:  1. Food and nutrition related knowledge deficit related to the lack of prior nutrition education as evidenced by diet history and 24 hour recall        NUTRITION INTERVENTION:    Nutrition Prescription:  · Total Energy Estimated Needs: 2500 Kcal/d (for weight maintenance)  · Method for Estimating Needs: [place]  · Total Protein Estimated Needs: 85-90 g/d  · Method for Estimating Needs: 0.8-1.2 g/Kg BW  · Total Fluid Estimated Needs: 1 mL/Kcal    Meals and Snacks: 2 gram sodium, Mediterranean diet  Goal(s)  1. Patient has a rehab weight loss goal of 0 lbs, 1 lb per week  2. Patient willing to increase fish intake (non-fried varieties) to a goal of 2-3 servings per week.   3. Patient willing to increase fruit and vegetable intake    Nutrition Education-Content: Purpose of the nutrition education, priority modifications, nutrition relationship to health/disease, and recommended modifications  Goal(s)  1. Understand and adhere to Mediterranean diet      Comments:  Discussed ways to incorporate healthy snacks, eating on a schedule, and monitoring sodium intake for heart health.    Nutrition Education:   Person taught: patient   Comprehension: good    Preferred Learning Method: verbal and written   Education Needed/Provided: Nutrition counseling and education related to cardiac rehabilitation   Outcome/Evaluation: Verbalizes understanding and was able to demonstrate comprehension on verification.    Nutrition Education Method: Weekly nutrition lectures on the Mediterranean diet, cooking, shopping, and dining out    Written Materials Provided:  3 Day Food Record, Introduction to Mediterranean Diet    Strategies Implemented: Motivational interviewing, Goal setting, Self-Monitoring, and Problem Solving    Provided RD Contact Information: Yes      NUTRITION MONITORING:  Patient to participate in Cardiac Rehab sessions three times a week  12, 24, and Discharge Session Follow Up  Weekly Dietitian Weight Check  Follow Up Plan for Ongoing Self-Management Support      Marline Su, MS, RD, LDN

## 2019-12-03 NOTE — PROGRESS NOTES
Exercise:  I met with the patient for orientation.  Consent forms were signed, entry CPX test results were reviewed, proper attire and shoes were discussed, and strength assessment was performed.         The entry CPX test results included weight (189 lb), abdominal girth (40.5 in), BMI (27.1), and body composition (17.6%).  An estimated MET Level of 9.0 and a Peak VO2 of 17.3ml/kg/min (4.9 actual METS) were measured.  This places the patient in the high risk category.  Upon exit CPX an estimated MET Level of 11.7 will be desired to achieve the goal of a 30% improvement.     Based on entry CPX test, the target heart rate range for patient is 109 to 124 bpm.      The patient will attend cardiac rehab class 3 times per week which will include both aerobic and resistance training which will be modified to fit limitations.  Aerobic exercise will be 30-60 minutes with a goal intensity of 12-15 on the RPE scale.  Resistance training will incorporate free weights 1-2 sets, 10-15 repetitions with a beginning weight of 5 to 8 pounds based on strength assessment.    There were no limitations noted by patient.  The patient stated he is currently not exercising.  The patient was encouraged to begin exercising aerobically and to add at least two additional days of aerobic exercise per week outside of attending cardiac rehab class 3 days/week for a minimum of 30 minutes.  The patient stated understanding.      The patient will begin Cardiac Rehab on Monday, December 9 at 3:30pm.    Exercise prescription will be adjusted based on tolerance of exercise intensity by patient.    Cristiano Preciado., CEP

## 2019-12-03 NOTE — PROGRESS NOTES
Patient here for Phase II Cardiac Rehab orientation.     BP (!) 160/90 (BP Location: Right arm, Patient Position: Standing, BP Method: Large (Manual))   Pulse 78   Resp 16   SpO2 97%     ASSESSMENT:  Heart Sounds: regular rate and rhythm  Prosthetic Valve: No  Lung Sounds: clear to auscultation bilaterally  Capillary Refill: normal  Left Radial Pulse: Normal (+2)  Right Radial Pulse: Normal (+2)  Left Pedal Pulse: Normal (+2)  Right Pedal Pulse: Normal (+2)  Right Edema: none  Left Edema none  Strength: normal  Range of Motion: full range of motion  Diabetic patient's foot examination comments: N/A  Incisional site: N/A  Special needs: N/A    QOL:  Discussed [place], SF36, and PHQ-9 Questionnaire scores with patient.  Patient denies any overwhelming stress or anxiety.  Patient has been instructed to notify staff in the event that circumstances worsen.  Patient verbalizes understanding.    RISK FACTORS:  The following risk factors are present:  hyperlipidemia, hypertension, positive family history, exercise    GOALS:  Patient has set the following goals:  Decrease cholesterol level  Increase exercise tolerance  Increase knowledge of CAD  Decrease blood pressure  Weight loss  Demonstrate accurate pulse taking  ID & manage personal areas of stress  Stop smoking  Control diabetes by adjusting diet and exercise  Learn more about healthy eating    Discussed Cardiac Rehab program in depth with patient.  Medication list updated per patient & marked as reviewed.  Patient has been instructed to notify staff of any problems while attending rehab (ie: chest pain, shortness of breath, lightheadedness, dizziness).  Patient has been instructed to monitor blood pressure readings outside of rehab & to keep a daily log of the readings.  Patient verbalizes understanding.    Wilfrido James RN  Cardiac Rehab Nurse

## 2019-12-09 ENCOUNTER — CLINICAL SUPPORT (OUTPATIENT)
Dept: CARDIAC REHAB | Facility: CLINIC | Age: 59
End: 2019-12-09
Payer: COMMERCIAL

## 2019-12-09 DIAGNOSIS — Z98.61 POSTSURGICAL PERCUTANEOUS TRANSLUMINAL CORONARY ANGIOPLASTY STATUS: ICD-10-CM

## 2019-12-09 DIAGNOSIS — I25.10 CORONARY ARTERY DISEASE INVOLVING NATIVE CORONARY ARTERY OF NATIVE HEART WITHOUT ANGINA PECTORIS: ICD-10-CM

## 2019-12-09 PROCEDURE — 93798 PHYS/QHP OP CAR RHAB W/ECG: CPT | Mod: S$GLB,,, | Performed by: INTERNAL MEDICINE

## 2019-12-09 PROCEDURE — 93798 PR CARDIAC REHAB/MONITOR: ICD-10-PCS | Mod: S$GLB,,, | Performed by: INTERNAL MEDICINE

## 2019-12-09 NOTE — PROGRESS NOTES
Patient here for 1st day of Phase II Cardiac rehab session.  Normal sinus rhythm noted with frequent PVC's, couplet, triplets.  Patient denies any lightheadedness or dizziness.  Dr. Palacios notified of this & ordered for patient to increase Metoprolol from 25 mg to 50 mg & to be sure he is hydrating well.  Will send note to Dr. Jenkins.  Patient tolerated exercise session well with no complaints.  Will continue to monitor patient.

## 2019-12-10 ENCOUNTER — TELEPHONE (OUTPATIENT)
Dept: CARDIOLOGY | Facility: CLINIC | Age: 59
End: 2019-12-10

## 2019-12-10 DIAGNOSIS — I49.3 PVC (PREMATURE VENTRICULAR CONTRACTION): Primary | ICD-10-CM

## 2019-12-10 NOTE — TELEPHONE ENCOUNTER
----- Message from Krystal Easton RN sent at 12/9/2019  5:16 PM CST -----  Dr. Jenkins,  Mr. Gross began cardiac rehab today.  We noted sinus rhythm with frequent multifocal PVC's, couplets, & triplets on monitor.  Patient denies any lightheadedness or dizziness.  Dr. Palacios was notified of this information & ordered for patient to increase Metoprolol from 25 mg to 50 mg & to hydrate well.  She also wanted to see about having a holter monitor ordered for him.  Report can be reviewed in Epic under Card procedure, monitoring strips.  Please call with any questions at [phone].  Thanks,  Krystal Easton RN  Cardiac Rehab Nurse

## 2019-12-11 ENCOUNTER — CLINICAL SUPPORT (OUTPATIENT)
Dept: CARDIAC REHAB | Facility: CLINIC | Age: 59
End: 2019-12-11
Payer: COMMERCIAL

## 2019-12-11 DIAGNOSIS — I25.10 ATHEROSCLEROSIS OF NATIVE CORONARY ARTERY OF NATIVE HEART WITHOUT ANGINA PECTORIS: ICD-10-CM

## 2019-12-11 DIAGNOSIS — Z98.61 POSTSURGICAL PERCUTANEOUS TRANSLUMINAL CORONARY ANGIOPLASTY STATUS: ICD-10-CM

## 2019-12-11 PROCEDURE — 93798 PR CARDIAC REHAB/MONITOR: ICD-10-PCS | Mod: S$GLB,,, | Performed by: INTERNAL MEDICINE

## 2019-12-11 PROCEDURE — 93798 PHYS/QHP OP CAR RHAB W/ECG: CPT | Mod: S$GLB,,, | Performed by: INTERNAL MEDICINE

## 2019-12-12 ENCOUNTER — TELEPHONE (OUTPATIENT)
Dept: CARDIAC REHAB | Facility: CLINIC | Age: 59
End: 2019-12-12

## 2019-12-12 ENCOUNTER — DOCUMENTATION ONLY (OUTPATIENT)
Dept: CARDIAC REHAB | Facility: CLINIC | Age: 59
End: 2019-12-12

## 2019-12-13 ENCOUNTER — TELEPHONE (OUTPATIENT)
Dept: CARDIOLOGY | Facility: CLINIC | Age: 59
End: 2019-12-13

## 2019-12-13 NOTE — TELEPHONE ENCOUNTER
Spoke to patient today.    We scheduled him the holter monitor for 12-23-19.    I also called rehab, to let then know order was in,due to they were also requesting.    darby jeffries.

## 2019-12-16 ENCOUNTER — PATIENT OUTREACH (OUTPATIENT)
Dept: OTHER | Facility: OTHER | Age: 59
End: 2019-12-16

## 2019-12-16 ENCOUNTER — CLINICAL SUPPORT (OUTPATIENT)
Dept: CARDIAC REHAB | Facility: CLINIC | Age: 59
End: 2019-12-16
Payer: COMMERCIAL

## 2019-12-16 DIAGNOSIS — I25.10 ATHEROSCLEROSIS OF NATIVE CORONARY ARTERY OF NATIVE HEART WITHOUT ANGINA PECTORIS: ICD-10-CM

## 2019-12-16 DIAGNOSIS — Z98.61 POSTSURGICAL PERCUTANEOUS TRANSLUMINAL CORONARY ANGIOPLASTY STATUS: ICD-10-CM

## 2019-12-16 PROCEDURE — 93798 PR CARDIAC REHAB/MONITOR: ICD-10-PCS | Mod: S$GLB,,, | Performed by: INTERNAL MEDICINE

## 2019-12-16 PROCEDURE — 93798 PHYS/QHP OP CAR RHAB W/ECG: CPT | Mod: S$GLB,,, | Performed by: INTERNAL MEDICINE

## 2019-12-16 NOTE — PROGRESS NOTES
HYPERTENSION  Our goal is to get BP to consistently below 130/80mmHg and make the process convenient so patient can avoid extra trips to the office. Getting your blood pressure below 130/80mmHg (definition of control) will reduce your risk for heart attack, kidney failure, stroke and death (as well as kidney failure, eye disease, & dementia)      Reviewed that the Digital Medicine care team - consisting of a clinician and a health  - will follow the most current evidence-based national guidelines for treating your condition.  The health  will focus on lifestyle modifications and motivation while the clinician will focus on medication therapy.  The care team will review all data on a regular basis and reach out as needed.      Explained that one of the key parts of the program is communication with the care team.  Asked patient to respond to outreach attempts and complete questionnaires.  Stressed importance of medication adherence.    Explained that we expect patient to obtain several blood pressures per week at random times of day.  Instructed patient not to allow anyone else to use phone and monitoring device.  Confirmed appropriate BP monitoring technique.      Explained to patient that the digital medicine team is not available for emergencies.  Patient will call [facility] on-call ([phone] or [phone]) or [phone] if needed.      Patient's BP goal is 130/80.Patient's BP average is 167/90 mmHg, which is above goal, per 2017 ACC/AHA Hypertension Guidelines.

## 2019-12-16 NOTE — LETTER
December 16, 2019     Domingo Gross Sr.  [address]  [place] [address]       Dear Domingo,    Welcome to [facility]! Our goal is to make care effective, proactive and convenient by using data you send us from home to better treat your chronic conditions.              My name is Marybeth Mooney, and I am your dedicated Digital Medicine clinician. As an expert in medication management, I will help ensure that the medications you are taking continue to provide the intended benefits and help you reach your goals. You can reach me directly at [phone] or by sending me a message directly through your [facility] account.      I am [place] Nair and I will be your health . My job is to help you identify lifestyle changes to improve your disease control. We will talk about nutrition, exercise, and other ways you may be able to adjust your current habits to better your health. Additionally, we will help ensure you are completing the tests and screenings that are necessary to help manage your conditions. You can reach me directly at [phone] or by sending me a message directly through your [facility] account.    Most importantly, YOU are at the center of this team. Together, we will work to improve your overall health and encourage you to meet your goals for a healthier lifestyle.     What we expect from YOU:  · Please take frequent home blood pressure measurements. We ask that you take at least 1 blood pressure reading per week, but more information will better help us get you know you. Be sure you rest for a few minutes before taking the reading in a quiet, comfortable place.     Be available to receive phone calls or [facility] messages, when appropriate, from your care team. Please let us know if there are any specific days or times that work best for us to reach you via phone.     Complete routine tests and screenings. Dont worry, we will help keep you on track!           What  you should expect from your Digital Medicine Care Team:   We will work with you to create a personalized plan of care and provide you with encouragement and education, including regarding lifestyle changes, that could help you manage your disease states.     We will adjust your current medications, if needed, and continue to monitor your long-term progress.     We will provide you and your physician with monthly progress reports after you have been in the program for more than 30 days.     We will send you reminders through [facility] and text messages to help ensure you do not miss any testing deadlines to help manage your disease states.    You will be able to reach us by phone or through your [facility] account by clicking our names under Care Team on the right side of the home screen.    I look forward to working with you to achieve your blood pressure goals!    We look forward to working with you to help manage your health,    Sincerely,    Your Digital Medicine Team    Please visit our websites to learn more:   · Hypertension: [email or web address]      Remember, we are not available for emergencies. If you have an emergency, please contact your doctors office directly or call [facility] on-call ([phone] or [phone]) or [phone].

## 2019-12-16 NOTE — LETTER
December 16, 2019     Domingo Gross Sr.  [address]  [place] [address]       Dear Domingo,    Welcome to [facility]! Our goal is to make care effective, proactive and convenient by using data you send us from home to better treat your chronic conditions.              My name is Marybeth Mooney, and I am your dedicated Digital Medicine clinician. As an expert in medication management, I will help ensure that the medications you are taking continue to provide the intended benefits and help you reach your goals. You can reach me directly at [phone] or by sending me a message directly through your [facility] account.      I am [place] Nair and I will be your health . My job is to help you identify lifestyle changes to improve your disease control. We will talk about nutrition, exercise, and other ways you may be able to adjust your current habits to better your health. Additionally, we will help ensure you are completing the tests and screenings that are necessary to help manage your conditions. You can reach me directly at [phone] or by sending me a message directly through your [facility] account.    Most importantly, YOU are at the center of this team. Together, we will work to improve your overall health and encourage you to meet your goals for a healthier lifestyle.     What we expect from YOU:  · Please take frequent home blood pressure measurements. We ask that you take at least 1 blood pressure reading per week, but more information will better help us get you know you. Be sure you rest for a few minutes before taking the reading in a quiet, comfortable place.     Be available to receive phone calls or [facility] messages, when appropriate, from your care team. Please let us know if there are any specific days or times that work best for us to reach you via phone.     Complete routine tests and screenings. Dont worry, we will help keep you on track!           What  you should expect from your Digital Medicine Care Team:   We will work with you to create a personalized plan of care and provide you with encouragement and education, including regarding lifestyle changes, that could help you manage your disease states.     We will adjust your current medications, if needed, and continue to monitor your long-term progress.     We will provide you and your physician with monthly progress reports after you have been in the program for more than 30 days.     We will send you reminders through [facility] and text messages to help ensure you do not miss any testing deadlines to help manage your disease states.    You will be able to reach us by phone or through your [facility] account by clicking our names under Care Team on the right side of the home screen.    I look forward to working with you to achieve your blood pressure goals!    We look forward to working with you to help manage your health,    Sincerely,    Your Digital Medicine Team    Please visit our websites to learn more:   · Hypertension: [email or web address]      Remember, we are not available for emergencies. If you have an emergency, please contact your doctors office directly or call [facility] on-call ([phone] or [phone]) or [phone].

## 2019-12-17 ENCOUNTER — PATIENT OUTREACH (OUTPATIENT)
Dept: OTHER | Facility: OTHER | Age: 59
End: 2019-12-17

## 2019-12-17 DIAGNOSIS — I10 ESSENTIAL HYPERTENSION: ICD-10-CM

## 2019-12-17 RX ORDER — METOPROLOL SUCCINATE 25 MG/1
25 TABLET, EXTENDED RELEASE ORAL 2 TIMES DAILY
Qty: 60 TABLET | Refills: 1 | Status: SHIPPED | OUTPATIENT
Start: 2019-12-17 | End: 2019-12-23

## 2019-12-17 NOTE — PROGRESS NOTES
Digital Medicine: Clinician Introduction    Domingo Gross Sr. is a 59 y.o. male who is newly enrolled in the Digital Medicine Clinic. Pertinent PMH includes CAD, A. Fib, HTN and PAD. Patient recently started cardiac rehab and reports elevated blood pressure at his sessions and at home. Metoprolol was increased from 25 mg daily to 25 mg twice daily and patient is tolerating dose well with no complaints. He also, reports adherence to losartan and hydrochlorothiazide and denies missed doses. He is concerned that readings have been steadily increasing at home but, he feels fine. We reviewed technique and discovered that patient has been checking his blood pressure while sitting at the side of the bed. When reviewed the position changes and appropriate method for checking his blood pressure he informed me that his device does not hold a charge.     The following information was reviewed and updated:  [facility] pharmacy   [id number] - [place] - [address] AT [facility]  [address] [address]  Phone: [phone] Fax: [phone]      Patient prefers a 90 days supply.     Review of patient's allergies indicates:   Allergen Reactions    Ace inhibitors Rash       The history is provided by the patient. No  was used.     HYPERTENSION  Our goal is to get BP to consistently below 130/80mmHg and make the process convenient so patient can avoid extra trips to the office. Getting your blood pressure below 130/80mmHg (definition of control) will reduce your risk for heart attack, kidney failure, stroke and death (as well as kidney failure, eye disease, & dementia)      Reviewed non-pharmacologic therapies and impact on BP      Explained that we expect patient to obtain several blood pressures per week at random times of day.  Instructed patient not to allow anyone else to use phone and monitoring device.  Confirmed appropriate BP  monitoring technique.      Explained to patient that the [facility] medicine team is not available for emergencies.  Patient will call [facility] on-call ([phone] or [phone]) or [phone] if needed.      Clinician received high BP alert.    Patient is not experiencing symptoms.    Troubleshooting devices: [facility] support needed      Med Review complete.    Allergies reviewed.      Last 5 Patient Entered Readings                                      Current 30 Day Average: 169/89     Recent Readings 12/17/2019 12/16/2019 12/16/2019 12/15/2019 12/15/2019    SBP (mmHg) 182 174 162 156 164    DBP (mmHg) 84 80 89 87 86    Pulse 74 77 76 71 75                Assessment:  Patient's current 30-day average is not at goal of <130/80 mmHg secondary to technique and possible cuff defect.        Plan:  Continue current regimen.  Patient will exchange device tomorrow and resume regular monitoring using the technique that we discussed.  If blood pressure remains elevated can maximize metoprolol, refill sent with updated directions.   Patients health , Crystal Nair, will follow-up as scheduled.    I will continue to monitor regularly and will follow-up in 1 week, sooner if blood pressure begins to trend upward or downward.          Patient has my contact information and knows to call with any concerns or clinical changes.          Current Medication Regimen:  Hypertension Medications             hydroCHLOROthiazide (HYDRODIURIL) 25 MG tablet Take 1 tablet (25 mg total) by mouth once daily.    losartan (COZAAR) 100 MG tablet Take 1 tablet (100 mg total) by mouth once daily.    metoprolol succinate (TOPROL-XL) 25 MG 24 hr tablet Take 1 tablet (25 mg total) by mouth 2 (two) times daily.          Reviewed the importance of self-monitoring, medication adherence, and that the health  can be used as a resource for lifestyle modifications to help reduce or maintain a healthy lifestyle.    Sent link to [facility] [place] webpages and my contact information via [employer] for future questions. Follow up scheduled.

## 2019-12-18 ENCOUNTER — CLINICAL SUPPORT (OUTPATIENT)
Dept: CARDIAC REHAB | Facility: CLINIC | Age: 59
End: 2019-12-18
Payer: COMMERCIAL

## 2019-12-18 DIAGNOSIS — Z98.61 POSTSURGICAL PERCUTANEOUS TRANSLUMINAL CORONARY ANGIOPLASTY STATUS: ICD-10-CM

## 2019-12-18 DIAGNOSIS — I25.10 CORONARY ATHEROSCLEROSIS OF NATIVE CORONARY ARTERY: ICD-10-CM

## 2019-12-18 PROCEDURE — 99999 PR PBB SHADOW E&M-EST. PATIENT-LVL I: CPT | Mod: PBBFAC,,,

## 2019-12-18 PROCEDURE — 93798 PR CARDIAC REHAB/MONITOR: ICD-10-PCS | Mod: S$GLB,,, | Performed by: INTERNAL MEDICINE

## 2019-12-18 PROCEDURE — 99999 PR PBB SHADOW E&M-EST. PATIENT-LVL I: ICD-10-PCS | Mod: PBBFAC,,,

## 2019-12-18 PROCEDURE — 93798 PHYS/QHP OP CAR RHAB W/ECG: CPT | Mod: S$GLB,,, | Performed by: INTERNAL MEDICINE

## 2019-12-20 ENCOUNTER — TELEPHONE (OUTPATIENT)
Dept: CARDIOLOGY | Facility: HOSPITAL | Age: 59
End: 2019-12-20

## 2019-12-20 ENCOUNTER — CLINICAL SUPPORT (OUTPATIENT)
Dept: CARDIAC REHAB | Facility: CLINIC | Age: 59
End: 2019-12-20
Payer: COMMERCIAL

## 2019-12-20 DIAGNOSIS — I25.10 ATHEROSCLEROSIS OF NATIVE CORONARY ARTERY OF NATIVE HEART WITHOUT ANGINA PECTORIS: ICD-10-CM

## 2019-12-20 DIAGNOSIS — Z98.61 POSTSURGICAL PERCUTANEOUS TRANSLUMINAL CORONARY ANGIOPLASTY STATUS: ICD-10-CM

## 2019-12-20 PROCEDURE — 93798 PR CARDIAC REHAB/MONITOR: ICD-10-PCS | Mod: S$GLB,,, | Performed by: INTERNAL MEDICINE

## 2019-12-20 PROCEDURE — 93798 PHYS/QHP OP CAR RHAB W/ECG: CPT | Mod: S$GLB,,, | Performed by: INTERNAL MEDICINE

## 2019-12-20 NOTE — PROGRESS NOTES
Pt had frequent PVC'S and a jean-claude during exercise but was asymptomatic. Dr. Diaz reviewed monitor strips and sent message to pt's PCP re: pt needs K+ level done and also recommended pt to hydrate more. Patient has holter monitor scheduled for Monday 12/23/2019. Pt tolerated exercises well and was discharged with instructions to hydrate.

## 2019-12-20 NOTE — TELEPHONE ENCOUNTER
If BP remains elevated greater than 130/80 mmHg he can increase toprol to 50 mg po bid      Thanks      ZN

## 2019-12-23 ENCOUNTER — HOSPITAL ENCOUNTER (OUTPATIENT)
Dept: CARDIOLOGY | Facility: HOSPITAL | Age: 59
Discharge: HOME | End: 2019-12-23
Attending: INTERNAL MEDICINE
Payer: COMMERCIAL

## 2019-12-23 ENCOUNTER — PATIENT OUTREACH (OUTPATIENT)
Dept: OTHER | Facility: OTHER | Age: 59
End: 2019-12-23

## 2019-12-23 DIAGNOSIS — I10 ESSENTIAL HYPERTENSION: ICD-10-CM

## 2019-12-23 DIAGNOSIS — I49.3 PVC (PREMATURE VENTRICULAR CONTRACTION): ICD-10-CM

## 2019-12-23 PROCEDURE — 93227 XTRNL ECG REC<48 HR R&I: CPT | Mod: ,,, | Performed by: INTERNAL MEDICINE

## 2019-12-23 PROCEDURE — 93227 HOLTER MONITOR - 48 HOUR (CUPID ONLY): ICD-10-PCS | Mod: ,,, | Performed by: INTERNAL MEDICINE

## 2019-12-23 PROCEDURE — 93226 XTRNL ECG REC<48 HR SCAN A/R: CPT

## 2019-12-23 RX ORDER — METOPROLOL SUCCINATE 25 MG/1
50 TABLET, EXTENDED RELEASE ORAL 2 TIMES DAILY
Qty: 60 TABLET | Refills: 1
Start: 2019-12-23 | End: 2020-01-07

## 2019-12-23 NOTE — PROGRESS NOTES
Digital Medicine: Clinician Follow-Up    Called patient for digital medicine follow-up. He is doing well overall but, his blood pressures remain elevated at home and at cardiac rehab. Patient says that he was able to get his device fixed at the [facility] and yesterday's reading was much better.     The history is provided by the patient. No  was used.     Follow Up  Follow-up reason(s): medication change      Medication Change: dose increase            Assessment:  Patient's current 30-day average is not at goal of <130/80 mmHg.       Plan:  Increase metoprolol to 50 mg twice daily and continue other medications as prescribed.   Patients health , Crystal Nair, will follow-up as scheduled.    I will continue to monitor regularly and will follow-up in 2-3 weeks, sooner if blood pressure begins to trend upward or downward.     Patient has my contact information and knows to call with any concerns or clinical changes.            There are no preventive care reminders to display for this patient.    Last 5 Patient Entered Readings                                      Current 30 Day Average: 162/86     Recent Readings 12/22/2019 12/22/2019 12/21/2019 12/21/2019 12/20/2019    SBP (mmHg) 142 178 156 168 159    DBP (mmHg) 83 77 78 83 85    Pulse 71 71 67 70 69           Hypertension Medications             hydroCHLOROthiazide (HYDRODIURIL) 25 MG tablet Take 1 tablet (25 mg total) by mouth once daily.    losartan (COZAAR) 100 MG tablet Take 1 tablet (100 mg total) by mouth once daily.    metoprolol succinate (TOPROL-XL) 25 MG 24 hr tablet Take 2 tablets (50 mg total) by mouth 2 (two) times daily.               Screenings

## 2019-12-27 ENCOUNTER — CLINICAL SUPPORT (OUTPATIENT)
Dept: CARDIAC REHAB | Facility: CLINIC | Age: 59
End: 2019-12-27
Payer: COMMERCIAL

## 2019-12-27 DIAGNOSIS — I25.10 CORONARY ATHEROSCLEROSIS OF NATIVE CORONARY ARTERY: ICD-10-CM

## 2019-12-27 DIAGNOSIS — Z98.61 POSTSURGICAL PERCUTANEOUS TRANSLUMINAL CORONARY ANGIOPLASTY STATUS: ICD-10-CM

## 2019-12-27 LAB
OHS CV EVENT MONITOR DAY: 0
OHS CV HOLTER LENGTH DECIMAL HOURS: 48
OHS CV HOLTER LENGTH HOURS: 48
OHS CV HOLTER LENGTH MINUTES: 0

## 2019-12-27 PROCEDURE — 99999 PR PBB SHADOW E&M-EST. PATIENT-LVL I: ICD-10-PCS | Mod: PBBFAC,,,

## 2019-12-27 PROCEDURE — 99999 PR PBB SHADOW E&M-EST. PATIENT-LVL I: CPT | Mod: PBBFAC,,,

## 2019-12-27 PROCEDURE — 93798 PR CARDIAC REHAB/MONITOR: ICD-10-PCS | Mod: S$GLB,,, | Performed by: INTERNAL MEDICINE

## 2019-12-27 PROCEDURE — 93798 PHYS/QHP OP CAR RHAB W/ECG: CPT | Mod: S$GLB,,, | Performed by: INTERNAL MEDICINE

## 2019-12-30 ENCOUNTER — PATIENT MESSAGE (OUTPATIENT)
Dept: CARDIOLOGY | Facility: CLINIC | Age: 59
End: 2019-12-30

## 2019-12-30 ENCOUNTER — PATIENT OUTREACH (OUTPATIENT)
Dept: OTHER | Facility: OTHER | Age: 59
End: 2019-12-30

## 2019-12-30 ENCOUNTER — CLINICAL SUPPORT (OUTPATIENT)
Dept: CARDIAC REHAB | Facility: CLINIC | Age: 59
End: 2019-12-30
Payer: COMMERCIAL

## 2019-12-30 DIAGNOSIS — I25.10 ATHEROSCLEROSIS OF NATIVE CORONARY ARTERY OF NATIVE HEART WITHOUT ANGINA PECTORIS: ICD-10-CM

## 2019-12-30 DIAGNOSIS — Z98.61 POSTSURGICAL PERCUTANEOUS TRANSLUMINAL CORONARY ANGIOPLASTY STATUS: ICD-10-CM

## 2019-12-30 PROCEDURE — 93798 PR CARDIAC REHAB/MONITOR: ICD-10-PCS | Mod: S$GLB,,, | Performed by: INTERNAL MEDICINE

## 2019-12-30 PROCEDURE — 93798 PHYS/QHP OP CAR RHAB W/ECG: CPT | Mod: S$GLB,,, | Performed by: INTERNAL MEDICINE

## 2019-12-30 NOTE — PROGRESS NOTES
Digital Medicine: Health  Introduction    Introduced Domingo Gross to Digital Medicine. Discussed health  role and recommended lifestyle modifications.    I called Mr. Gross to complete HC enrollment call. He states that he has no idea what is causing his blood pressure readings to be elevated. He does not want to set a goal at this time. Patient states that high blood pressure runs in his family (his mom and grandmother both had it). He notes that his stress levels at work have reduced because he told them he wasn't going to do the work and overrun himself.     The history is provided by the patient. No  was used.     HYPERTENSION  Our goal is to get BP to consistently below 130/80mmHg and make the process convenient so patient can avoid extra trips to the office. Getting your blood pressure below 130/80mmHg (definition of control) will reduce your risk for heart attack, kidney failure, stroke and death (as well as kidney failure, eye disease, & dementia)      Reviewed non-pharmacologic therapies and impact on BP.      Explained that we expect patient to obtain several blood pressures per week at random times of day.  Instructed patient not to allow anyone else to use phone and monitoring device.  Confirmed appropriate BP monitoring technique.      Patient's BP goal is 130/80.Patient's BP average is 160/86 mmHg, which is above goal, per 2017 ACC/AHA Hypertension Guidelines.          Last 5 Patient Entered Readings                                      Current 30 Day Average: 160/86     Recent Readings 12/30/2019 12/30/2019 12/29/2019 12/29/2019 12/28/2019    SBP (mmHg) 159 174 148 172 158    DBP (mmHg) 68 92 81 89 84    Pulse 65 71 71 69 69            INTERVENTION(S)  recommended diet modifications, recommend physical activity, reviewed monitoring technique and encouragement/support    PLAN  patient verbalizes understanding and continue monitoring      There are no preventive care  reminders to display for this patient.    Reviewed the importance of self-monitoring, medication adherence, and that the health  can be used as a resource for lifestyle modifications to help reduce or maintain a healthy lifestyle.    Sent link to [facility] webpages and my contact information via [employer] for future questions. Follow up scheduled.             Diet Screening   He has the following dietary restrictions: low sodium diet    The patient states that he typically eats a non-home cooked meal (ex: dining out, take out, frozen meals) 1-2 times per week.  He cooks for self and has meals prepared by family.      Mr. Gross could not really describe his typical diet. He eats 2-3 meals per day. He mainly eats at home, and he and his wife trade off cooking. He denies any salt added while cooking. When asked what he seasons with, he states pepper and sometimes Olu's. I encouraged him to read that nutrition label on Olu's or considering trying the low sodium Olu's version. He is open to that. He drinks 3 soft drinks a day along with coffee. He drinks a couple of bottles of water a day.     Intervention(s): reducing sodium intake and increasing water intake    Assigning the following patient goals: maintain low sodium diet    Physical Activity Screening   When asked if exercising, patient responded: yes    Patient participates in the following activities: walking and physical therapy/rehab    He states that he is walking at work all day. His job is not very sedentary. He goes to therapy three times a week to work on his cardiovascular health. He recently had a heart attack.     Assigning the following patient goal(s): 150 minutes of exercise per week and participate in exercise weekly    Medication Adherence Screening   He misses doses: never      No side effects at this time.     Tobacco and Alcohol Screening       No tobacco.     No alcohol.       SDOH

## 2020-01-03 ENCOUNTER — CLINICAL SUPPORT (OUTPATIENT)
Dept: CARDIAC REHAB | Facility: CLINIC | Age: 60
End: 2020-01-03
Payer: COMMERCIAL

## 2020-01-03 DIAGNOSIS — Z98.61 POSTSURGICAL PERCUTANEOUS TRANSLUMINAL CORONARY ANGIOPLASTY STATUS: ICD-10-CM

## 2020-01-03 DIAGNOSIS — I25.10 CORONARY ATHEROSCLEROSIS OF NATIVE CORONARY ARTERY: ICD-10-CM

## 2020-01-03 PROCEDURE — 99999 PR PBB SHADOW E&M-EST. PATIENT-LVL I: CPT | Mod: PBBFAC,,,

## 2020-01-03 PROCEDURE — 93798 PHYS/QHP OP CAR RHAB W/ECG: CPT | Mod: S$GLB,,, | Performed by: INTERNAL MEDICINE

## 2020-01-03 PROCEDURE — 99999 PR PBB SHADOW E&M-EST. PATIENT-LVL I: ICD-10-PCS | Mod: PBBFAC,,,

## 2020-01-03 PROCEDURE — 93798 PR CARDIAC REHAB/MONITOR: ICD-10-PCS | Mod: S$GLB,,, | Performed by: INTERNAL MEDICINE

## 2020-01-06 ENCOUNTER — TELEPHONE (OUTPATIENT)
Dept: CARDIOLOGY | Facility: CLINIC | Age: 60
End: 2020-01-06

## 2020-01-06 DIAGNOSIS — I10 ESSENTIAL HYPERTENSION: ICD-10-CM

## 2020-01-06 NOTE — TELEPHONE ENCOUNTER
----- Message from Krystal Easton RN sent at 1/6/2020  1:35 PM CST -----  Dr. Jenkins,  Mr. Gross has been attending Phase II cardiac rehab.  We continue to note very frequent PVC's, couplets, triplets on monitor.  I am aware that he had a holter monitor done recently.  No changes were made with medications.  Please review in Epic, under card procedure, his EKG strips.  He denies chest pain, shortness of breath, lightheadedness or dizziness.  [place] [phone] with any questions.  Krystal Easton RN  Cardiac Rehab Nurse

## 2020-01-07 ENCOUNTER — PATIENT MESSAGE (OUTPATIENT)
Dept: CARDIOLOGY | Facility: CLINIC | Age: 60
End: 2020-01-07

## 2020-01-07 ENCOUNTER — PATIENT MESSAGE (OUTPATIENT)
Dept: ADMINISTRATIVE | Facility: OTHER | Age: 60
End: 2020-01-07

## 2020-01-07 RX ORDER — METOPROLOL SUCCINATE 100 MG/1
100 TABLET, EXTENDED RELEASE ORAL 2 TIMES DAILY
Qty: 180 TABLET | Refills: 3 | Status: SHIPPED | OUTPATIENT
Start: 2020-01-07 | End: 2020-03-18 | Stop reason: ALTCHOICE

## 2020-01-08 ENCOUNTER — CLINICAL SUPPORT (OUTPATIENT)
Dept: CARDIAC REHAB | Facility: CLINIC | Age: 60
End: 2020-01-08
Payer: COMMERCIAL

## 2020-01-08 ENCOUNTER — PATIENT MESSAGE (OUTPATIENT)
Dept: CARDIOLOGY | Facility: CLINIC | Age: 60
End: 2020-01-08

## 2020-01-08 DIAGNOSIS — I10 ESSENTIAL HYPERTENSION: ICD-10-CM

## 2020-01-08 DIAGNOSIS — Z98.61 POSTSURGICAL PERCUTANEOUS TRANSLUMINAL CORONARY ANGIOPLASTY STATUS: ICD-10-CM

## 2020-01-08 DIAGNOSIS — E78.5 HYPERLIPIDEMIA, UNSPECIFIED HYPERLIPIDEMIA TYPE: ICD-10-CM

## 2020-01-08 DIAGNOSIS — I25.10 ATHEROSCLEROSIS OF NATIVE CORONARY ARTERY OF NATIVE HEART WITHOUT ANGINA PECTORIS: ICD-10-CM

## 2020-01-08 PROCEDURE — 93798 PR CARDIAC REHAB/MONITOR: ICD-10-PCS | Mod: S$GLB,,, | Performed by: INTERNAL MEDICINE

## 2020-01-08 PROCEDURE — 93798 PHYS/QHP OP CAR RHAB W/ECG: CPT | Mod: S$GLB,,, | Performed by: INTERNAL MEDICINE

## 2020-01-08 RX ORDER — CLOPIDOGREL BISULFATE 75 MG/1
75 TABLET ORAL DAILY
Qty: 90 TABLET | Refills: 3 | Status: SHIPPED | OUTPATIENT
Start: 2020-01-08 | End: 2020-04-17

## 2020-01-08 RX ORDER — ATORVASTATIN CALCIUM 80 MG/1
80 TABLET, FILM COATED ORAL DAILY
Qty: 90 TABLET | Refills: 3 | Status: SHIPPED | OUTPATIENT
Start: 2020-01-08 | End: 2020-04-17

## 2020-01-08 RX ORDER — LOSARTAN POTASSIUM 100 MG/1
100 TABLET ORAL DAILY
Qty: 90 TABLET | Refills: 3 | Status: SHIPPED | OUTPATIENT
Start: 2020-01-08 | End: 2020-02-12 | Stop reason: ALTCHOICE

## 2020-01-08 NOTE — TELEPHONE ENCOUNTER
We will increase his beta-blocker dose           Jolynn please call and notify him of the change made           Thanks                      I called and spoke to patient this morning.    Advised per  that his Metoprolol has been increased and called into his Pharmacy.    Please call us back for any concerns.    Patient verbalized understanding and appreciates the call.          dabry Wick (rita).

## 2020-01-08 NOTE — TELEPHONE ENCOUNTER
Thanks      We will increase his beta-blocker dose        Jolnyn please call and notify him of the change made        Thanks        ZN

## 2020-01-10 ENCOUNTER — CLINICAL SUPPORT (OUTPATIENT)
Dept: CARDIAC REHAB | Facility: CLINIC | Age: 60
End: 2020-01-10
Payer: COMMERCIAL

## 2020-01-10 DIAGNOSIS — Z98.61 POSTSURGICAL PERCUTANEOUS TRANSLUMINAL CORONARY ANGIOPLASTY STATUS: ICD-10-CM

## 2020-01-10 DIAGNOSIS — I25.10 ATHEROSCLEROSIS OF NATIVE CORONARY ARTERY OF NATIVE HEART WITHOUT ANGINA PECTORIS: ICD-10-CM

## 2020-01-10 PROCEDURE — 93798 PHYS/QHP OP CAR RHAB W/ECG: CPT | Mod: S$GLB,,, | Performed by: INTERNAL MEDICINE

## 2020-01-10 PROCEDURE — 93798 PR CARDIAC REHAB/MONITOR: ICD-10-PCS | Mod: S$GLB,,, | Performed by: INTERNAL MEDICINE

## 2020-01-11 ENCOUNTER — PATIENT MESSAGE (OUTPATIENT)
Dept: CARDIOLOGY | Facility: CLINIC | Age: 60
End: 2020-01-11

## 2020-01-14 ENCOUNTER — PATIENT OUTREACH (OUTPATIENT)
Dept: OTHER | Facility: OTHER | Age: 60
End: 2020-01-14

## 2020-01-14 DIAGNOSIS — I10 ESSENTIAL HYPERTENSION: Primary | ICD-10-CM

## 2020-01-15 ENCOUNTER — CLINICAL SUPPORT (OUTPATIENT)
Dept: CARDIAC REHAB | Facility: CLINIC | Age: 60
End: 2020-01-15
Payer: COMMERCIAL

## 2020-01-15 DIAGNOSIS — I25.10 ATHEROSCLEROSIS OF NATIVE CORONARY ARTERY OF NATIVE HEART WITHOUT ANGINA PECTORIS: ICD-10-CM

## 2020-01-15 DIAGNOSIS — Z98.61 POSTSURGICAL PERCUTANEOUS TRANSLUMINAL CORONARY ANGIOPLASTY STATUS: ICD-10-CM

## 2020-01-15 PROCEDURE — 93798 PR CARDIAC REHAB/MONITOR: ICD-10-PCS | Mod: S$GLB,,, | Performed by: INTERNAL MEDICINE

## 2020-01-15 PROCEDURE — 93798 PHYS/QHP OP CAR RHAB W/ECG: CPT | Mod: S$GLB,,, | Performed by: INTERNAL MEDICINE

## 2020-01-17 ENCOUNTER — CLINICAL SUPPORT (OUTPATIENT)
Dept: CARDIAC REHAB | Facility: CLINIC | Age: 60
End: 2020-01-17
Payer: COMMERCIAL

## 2020-01-17 DIAGNOSIS — Z98.61 POSTSURGICAL PERCUTANEOUS TRANSLUMINAL CORONARY ANGIOPLASTY STATUS: ICD-10-CM

## 2020-01-17 DIAGNOSIS — I25.10 ATHEROSCLEROSIS OF NATIVE CORONARY ARTERY OF NATIVE HEART WITHOUT ANGINA PECTORIS: ICD-10-CM

## 2020-01-17 PROCEDURE — 93798 PR CARDIAC REHAB/MONITOR: ICD-10-PCS | Mod: S$GLB,,, | Performed by: INTERNAL MEDICINE

## 2020-01-17 PROCEDURE — 93798 PHYS/QHP OP CAR RHAB W/ECG: CPT | Mod: S$GLB,,, | Performed by: INTERNAL MEDICINE

## 2020-01-20 ENCOUNTER — CLINICAL SUPPORT (OUTPATIENT)
Dept: CARDIAC REHAB | Facility: CLINIC | Age: 60
End: 2020-01-20
Payer: COMMERCIAL

## 2020-01-20 ENCOUNTER — PATIENT OUTREACH (OUTPATIENT)
Dept: OTHER | Facility: OTHER | Age: 60
End: 2020-01-20

## 2020-01-20 DIAGNOSIS — Z98.61 POSTSURGICAL PERCUTANEOUS TRANSLUMINAL CORONARY ANGIOPLASTY STATUS: ICD-10-CM

## 2020-01-20 DIAGNOSIS — I25.10 ATHEROSCLEROSIS OF NATIVE CORONARY ARTERY OF NATIVE HEART WITHOUT ANGINA PECTORIS: ICD-10-CM

## 2020-01-20 PROCEDURE — 93798 PHYS/QHP OP CAR RHAB W/ECG: CPT | Mod: S$GLB,,, | Performed by: INTERNAL MEDICINE

## 2020-01-20 PROCEDURE — 93798 PR CARDIAC REHAB/MONITOR: ICD-10-PCS | Mod: S$GLB,,, | Performed by: INTERNAL MEDICINE

## 2020-01-20 NOTE — PROGRESS NOTES
Digital Medicine: Health  Follow-Up    I called Mr. Gross to follow up. He states that his blood pressure is still up and low. He doesn't know exactly what causes those fluctuations. He attributes it possibly to stress at work. He has cut down on his soft drink consumption, and he is drinking 2 Cokes a day. He has also switched to no salt Olu Chachere's and he enjoys the taste. He continues to be active at work and walks often and takes the stairs. He still goes to physical therapy three times a week. He consistently takes his medications.     The history is provided by the patient. No  was used.     Follow Up  Follow-up reason(s): reading review          INTERVENTION(S)  recommended diet modifications and encouragement/support    PLAN  patient verbalizes understanding and continue monitoring      There are no preventive care reminders to display for this patient.    Last 5 Patient Entered Readings                                      Current 30 Day Average: 152/80     Recent Readings 1/20/2020 1/19/2020 1/19/2020 1/18/2020 1/17/2020    SBP (mmHg) 165 144 164 155 148    DBP (mmHg) 81 73 78 87 83    Pulse 66 62 59 65 65                      Diet Screening   He has the following dietary restrictions: low sodium diet    He has reduced his soft drink intake to two a day. He has also switched to no salt Olu's.      Intervention(s): limiting drinks that contain sugar, reducing sodium intake and increasing water intake    Physical Activity Screening   When asked if exercising, patient responded: yes    Patient participates in the following activities: physical therapy/rehab and walking    He continues going to physical therapy three times a week, and he is more active at work. He walks to work in the morning, which is about half a mile a way. I encouraged him to continue this up!     Assigning the following patient goal(s): participate in exercise weekly    Medication Adherence Screening   He  misses doses: never      Patient identified the following reasons for non-compliance: none      SDOH

## 2020-01-21 ENCOUNTER — PATIENT MESSAGE (OUTPATIENT)
Dept: ADMINISTRATIVE | Facility: OTHER | Age: 60
End: 2020-01-21

## 2020-01-22 ENCOUNTER — CLINICAL SUPPORT (OUTPATIENT)
Dept: CARDIAC REHAB | Facility: CLINIC | Age: 60
End: 2020-01-22
Payer: COMMERCIAL

## 2020-01-22 DIAGNOSIS — Z98.61 POSTSURGICAL PERCUTANEOUS TRANSLUMINAL CORONARY ANGIOPLASTY STATUS: ICD-10-CM

## 2020-01-22 DIAGNOSIS — I25.10 ATHEROSCLEROSIS OF NATIVE CORONARY ARTERY OF NATIVE HEART WITHOUT ANGINA PECTORIS: ICD-10-CM

## 2020-01-22 PROCEDURE — 93798 PHYS/QHP OP CAR RHAB W/ECG: CPT | Mod: S$GLB,,, | Performed by: INTERNAL MEDICINE

## 2020-01-22 PROCEDURE — 99999 PR PBB SHADOW E&M-EST. PATIENT-LVL I: CPT | Mod: PBBFAC,,,

## 2020-01-22 PROCEDURE — 93798 PR CARDIAC REHAB/MONITOR: ICD-10-PCS | Mod: S$GLB,,, | Performed by: INTERNAL MEDICINE

## 2020-01-22 PROCEDURE — 99999 PR PBB SHADOW E&M-EST. PATIENT-LVL I: ICD-10-PCS | Mod: PBBFAC,,,

## 2020-01-24 ENCOUNTER — PATIENT MESSAGE (OUTPATIENT)
Dept: CARDIOLOGY | Facility: CLINIC | Age: 60
End: 2020-01-24

## 2020-01-26 ENCOUNTER — PATIENT MESSAGE (OUTPATIENT)
Dept: OTHER | Facility: OTHER | Age: 60
End: 2020-01-26

## 2020-01-30 ENCOUNTER — HOSPITAL ENCOUNTER (OUTPATIENT)
Facility: HOSPITAL | Age: 60
Discharge: HOME | End: 2020-01-31
Attending: EMERGENCY MEDICINE | Admitting: FAMILY MEDICINE
Payer: COMMERCIAL

## 2020-01-30 ENCOUNTER — PATIENT MESSAGE (OUTPATIENT)
Dept: CARDIOLOGY | Facility: CLINIC | Age: 60
End: 2020-01-30

## 2020-01-30 DIAGNOSIS — R07.9 CHEST PAIN: Primary | ICD-10-CM

## 2020-01-30 DIAGNOSIS — R79.89 ELEVATED TROPONIN: ICD-10-CM

## 2020-01-30 PROBLEM — N17.9 AKI (ACUTE KIDNEY INJURY): Status: ACTIVE | Noted: 2020-01-30

## 2020-01-30 LAB
ALBUMIN SERPL BCP-MCNC: 2.7 G/DL (ref 3.5–5.2)
ALP SERPL-CCNC: 84 U/L (ref 55–135)
ALT SERPL W/O P-5'-P-CCNC: 16 U/L (ref 10–44)
ANION GAP SERPL CALC-SCNC: 10 MMOL/L (ref 8–16)
AST SERPL-CCNC: 26 U/L (ref 10–40)
BASOPHILS # BLD AUTO: 0.06 K/UL (ref 0–0.2)
BASOPHILS NFR BLD: 1 % (ref 0–1.9)
BILIRUB SERPL-MCNC: 0.2 MG/DL (ref 0.1–1)
BNP SERPL-MCNC: 67 PG/ML (ref 0–99)
BUN SERPL-MCNC: 28 MG/DL (ref 6–20)
CALCIUM SERPL-MCNC: 8.5 MG/DL (ref 8.7–10.5)
CHLORIDE SERPL-SCNC: 103 MMOL/L (ref 95–110)
CO2 SERPL-SCNC: 27 MMOL/L (ref 23–29)
CREAT SERPL-MCNC: 1.6 MG/DL (ref 0.5–1.4)
DIFFERENTIAL METHOD: ABNORMAL
EOSINOPHIL # BLD AUTO: 0.3 K/UL (ref 0–0.5)
EOSINOPHIL NFR BLD: 4.9 % (ref 0–8)
ERYTHROCYTE [DISTWIDTH] IN BLOOD BY AUTOMATED COUNT: 13 % (ref 11.5–14.5)
EST. GFR  (AFRICAN AMERICAN): 54 ML/MIN/1.73 M^2
EST. GFR  (NON AFRICAN AMERICAN): 47 ML/MIN/1.73 M^2
GLUCOSE SERPL-MCNC: 86 MG/DL (ref 70–110)
HCT VFR BLD AUTO: 31.1 % (ref 40–54)
HGB BLD-MCNC: 10.4 G/DL (ref 14–18)
INR PPP: 1 (ref 0.8–1.2)
LYMPHOCYTES # BLD AUTO: 2.1 K/UL (ref 1–4.8)
LYMPHOCYTES NFR BLD: 34.6 % (ref 18–48)
MCH RBC QN AUTO: 26.5 PG (ref 27–31)
MCHC RBC AUTO-ENTMCNC: 33.4 G/DL (ref 32–36)
MCV RBC AUTO: 79 FL (ref 82–98)
MONOCYTES # BLD AUTO: 0.5 K/UL (ref 0.3–1)
MONOCYTES NFR BLD: 8.9 % (ref 4–15)
NEUTROPHILS # BLD AUTO: 3 K/UL (ref 1.8–7.7)
NEUTROPHILS NFR BLD: 50.6 % (ref 38–73)
PLATELET # BLD AUTO: 254 K/UL (ref 150–350)
PMV BLD AUTO: 9.3 FL (ref 9.2–12.9)
POTASSIUM SERPL-SCNC: 3 MMOL/L (ref 3.5–5.1)
PROT SERPL-MCNC: 5.7 G/DL (ref 6–8.4)
PROTHROMBIN TIME: 10.5 SEC (ref 9–12.5)
RBC # BLD AUTO: 3.92 M/UL (ref 4.6–6.2)
SODIUM SERPL-SCNC: 140 MMOL/L (ref 136–145)
TROPONIN I SERPL DL<=0.01 NG/ML-MCNC: 0.03 NG/ML (ref 0–0.03)
TROPONIN I SERPL DL<=0.01 NG/ML-MCNC: 0.04 NG/ML (ref 0–0.03)
WBC # BLD AUTO: 5.95 K/UL (ref 3.9–12.7)

## 2020-01-30 PROCEDURE — G0378 HOSPITAL OBSERVATION PER HR: HCPCS

## 2020-01-30 PROCEDURE — 36415 COLL VENOUS BLD VENIPUNCTURE: CPT

## 2020-01-30 PROCEDURE — 85610 PROTHROMBIN TIME: CPT

## 2020-01-30 PROCEDURE — 85025 COMPLETE CBC W/AUTO DIFF WBC: CPT

## 2020-01-30 PROCEDURE — 25000003 PHARM REV CODE 250: Performed by: NURSE PRACTITIONER

## 2020-01-30 PROCEDURE — 96372 THER/PROPH/DIAG INJ SC/IM: CPT | Mod: 59

## 2020-01-30 PROCEDURE — 84484 ASSAY OF TROPONIN QUANT: CPT | Mod: 91

## 2020-01-30 PROCEDURE — 83880 ASSAY OF NATRIURETIC PEPTIDE: CPT

## 2020-01-30 PROCEDURE — 80053 COMPREHEN METABOLIC PANEL: CPT

## 2020-01-30 PROCEDURE — 96360 HYDRATION IV INFUSION INIT: CPT

## 2020-01-30 PROCEDURE — 99285 EMERGENCY DEPT VISIT HI MDM: CPT | Mod: 25

## 2020-01-30 PROCEDURE — 93005 ELECTROCARDIOGRAM TRACING: CPT

## 2020-01-30 PROCEDURE — 84484 ASSAY OF TROPONIN QUANT: CPT

## 2020-01-30 PROCEDURE — 63600175 PHARM REV CODE 636 W HCPCS: Performed by: NURSE PRACTITIONER

## 2020-01-30 RX ORDER — ONDANSETRON 2 MG/ML
4 INJECTION INTRAMUSCULAR; INTRAVENOUS EVERY 6 HOURS PRN
Status: DISCONTINUED | OUTPATIENT
Start: 2020-01-30 | End: 2020-01-31 | Stop reason: HOSPADM

## 2020-01-30 RX ORDER — MORPHINE SULFATE 2 MG/ML
3 INJECTION, SOLUTION INTRAMUSCULAR; INTRAVENOUS EVERY 6 HOURS PRN
Status: DISCONTINUED | OUTPATIENT
Start: 2020-01-30 | End: 2020-01-31 | Stop reason: HOSPADM

## 2020-01-30 RX ORDER — NAPROXEN SODIUM 220 MG/1
81 TABLET, FILM COATED ORAL DAILY
Status: DISCONTINUED | OUTPATIENT
Start: 2020-01-31 | End: 2020-01-31 | Stop reason: HOSPADM

## 2020-01-30 RX ORDER — ENOXAPARIN SODIUM 100 MG/ML
30 INJECTION SUBCUTANEOUS EVERY 24 HOURS
Status: DISCONTINUED | OUTPATIENT
Start: 2020-01-30 | End: 2020-01-31

## 2020-01-30 RX ORDER — POTASSIUM CHLORIDE 20 MEQ/1
40 TABLET, EXTENDED RELEASE ORAL ONCE
Status: COMPLETED | OUTPATIENT
Start: 2020-01-30 | End: 2020-01-30

## 2020-01-30 RX ORDER — LOSARTAN POTASSIUM 50 MG/1
100 TABLET ORAL DAILY
Status: DISCONTINUED | OUTPATIENT
Start: 2020-01-31 | End: 2020-01-31 | Stop reason: HOSPADM

## 2020-01-30 RX ORDER — HYDRALAZINE HYDROCHLORIDE 20 MG/ML
10 INJECTION INTRAMUSCULAR; INTRAVENOUS EVERY 8 HOURS PRN
Status: DISCONTINUED | OUTPATIENT
Start: 2020-01-30 | End: 2020-01-31 | Stop reason: HOSPADM

## 2020-01-30 RX ORDER — SODIUM CHLORIDE 0.9 % (FLUSH) 0.9 %
10 SYRINGE (ML) INJECTION
Status: DISCONTINUED | OUTPATIENT
Start: 2020-01-30 | End: 2020-01-31 | Stop reason: HOSPADM

## 2020-01-30 RX ORDER — ATORVASTATIN CALCIUM 40 MG/1
80 TABLET, FILM COATED ORAL DAILY
Status: DISCONTINUED | OUTPATIENT
Start: 2020-01-31 | End: 2020-01-31 | Stop reason: HOSPADM

## 2020-01-30 RX ORDER — CILOSTAZOL 50 MG/1
100 TABLET ORAL 2 TIMES DAILY
Status: DISCONTINUED | OUTPATIENT
Start: 2020-01-30 | End: 2020-01-31 | Stop reason: HOSPADM

## 2020-01-30 RX ORDER — TALC
6 POWDER (GRAM) TOPICAL NIGHTLY PRN
Status: DISCONTINUED | OUTPATIENT
Start: 2020-01-30 | End: 2020-01-31 | Stop reason: HOSPADM

## 2020-01-30 RX ORDER — METOPROLOL SUCCINATE 50 MG/1
100 TABLET, EXTENDED RELEASE ORAL 2 TIMES DAILY
Status: DISCONTINUED | OUTPATIENT
Start: 2020-01-30 | End: 2020-01-31 | Stop reason: HOSPADM

## 2020-01-30 RX ORDER — HYDROCHLOROTHIAZIDE 25 MG/1
25 TABLET ORAL DAILY
Status: DISCONTINUED | OUTPATIENT
Start: 2020-01-31 | End: 2020-01-31

## 2020-01-30 RX ORDER — IPRATROPIUM BROMIDE AND ALBUTEROL SULFATE 2.5; .5 MG/3ML; MG/3ML
3 SOLUTION RESPIRATORY (INHALATION) EVERY 4 HOURS PRN
Status: DISCONTINUED | OUTPATIENT
Start: 2020-01-30 | End: 2020-01-31 | Stop reason: HOSPADM

## 2020-01-30 RX ORDER — NITROGLYCERIN 0.4 MG/1
0.4 TABLET SUBLINGUAL EVERY 5 MIN PRN
Status: DISCONTINUED | OUTPATIENT
Start: 2020-01-30 | End: 2020-01-31 | Stop reason: HOSPADM

## 2020-01-30 RX ORDER — ACETAMINOPHEN 325 MG/1
650 TABLET ORAL EVERY 6 HOURS PRN
Status: DISCONTINUED | OUTPATIENT
Start: 2020-01-30 | End: 2020-01-31 | Stop reason: HOSPADM

## 2020-01-30 RX ORDER — CLOPIDOGREL BISULFATE 75 MG/1
75 TABLET ORAL DAILY
Status: DISCONTINUED | OUTPATIENT
Start: 2020-01-31 | End: 2020-01-31 | Stop reason: HOSPADM

## 2020-01-30 RX ORDER — SODIUM CHLORIDE 9 MG/ML
INJECTION, SOLUTION INTRAVENOUS CONTINUOUS
Status: DISCONTINUED | OUTPATIENT
Start: 2020-01-30 | End: 2020-01-31

## 2020-01-30 RX ADMIN — SODIUM CHLORIDE: 0.9 INJECTION, SOLUTION INTRAVENOUS at 10:01

## 2020-01-30 RX ADMIN — CILOSTAZOL 100 MG: 50 TABLET ORAL at 10:01

## 2020-01-30 RX ADMIN — POTASSIUM CHLORIDE 40 MEQ: 20 TABLET, EXTENDED RELEASE ORAL at 10:01

## 2020-01-30 RX ADMIN — ENOXAPARIN SODIUM 30 MG: 100 INJECTION SUBCUTANEOUS at 09:01

## 2020-01-30 RX ADMIN — METOPROLOL SUCCINATE 100 MG: 25 TABLET, FILM COATED, EXTENDED RELEASE ORAL at 09:01

## 2020-01-31 ENCOUNTER — TELEPHONE (OUTPATIENT)
Dept: INTERNAL MEDICINE | Facility: CLINIC | Age: 60
End: 2020-01-31

## 2020-01-31 ENCOUNTER — TELEPHONE (OUTPATIENT)
Dept: CARDIOLOGY | Facility: CLINIC | Age: 60
End: 2020-01-31

## 2020-01-31 VITALS
SYSTOLIC BLOOD PRESSURE: 147 MMHG | RESPIRATION RATE: 18 BRPM | BODY MASS INDEX: 26.33 KG/M2 | WEIGHT: 183.88 LBS | TEMPERATURE: 98 F | HEART RATE: 67 BPM | HEIGHT: 70 IN | DIASTOLIC BLOOD PRESSURE: 82 MMHG | OXYGEN SATURATION: 97 %

## 2020-01-31 DIAGNOSIS — R94.39 ABNORMAL STRESS ECG: ICD-10-CM

## 2020-01-31 DIAGNOSIS — I25.118 CORONARY ARTERY DISEASE WITH STABLE ANGINA PECTORIS, UNSPECIFIED VESSEL OR LESION TYPE, UNSPECIFIED WHETHER NATIVE OR TRANSPLANTED HEART: Primary | ICD-10-CM

## 2020-01-31 PROBLEM — N17.9 AKI (ACUTE KIDNEY INJURY): Status: RESOLVED | Noted: 2020-01-30 | Resolved: 2020-01-31

## 2020-01-31 LAB
ALBUMIN SERPL BCP-MCNC: 2.2 G/DL (ref 3.5–5.2)
ALP SERPL-CCNC: 75 U/L (ref 55–135)
ALT SERPL W/O P-5'-P-CCNC: 13 U/L (ref 10–44)
ANION GAP SERPL CALC-SCNC: 4 MMOL/L (ref 8–16)
AST SERPL-CCNC: 21 U/L (ref 10–40)
BASOPHILS # BLD AUTO: 0.04 K/UL (ref 0–0.2)
BASOPHILS NFR BLD: 0.8 % (ref 0–1.9)
BILIRUB SERPL-MCNC: 0.2 MG/DL (ref 0.1–1)
BSA FOR ECHO PROCEDURE: 2.03 M2
BUN SERPL-MCNC: 24 MG/DL (ref 6–20)
CALCIUM SERPL-MCNC: 8.4 MG/DL (ref 8.7–10.5)
CHLORIDE SERPL-SCNC: 108 MMOL/L (ref 95–110)
CO2 SERPL-SCNC: 28 MMOL/L (ref 23–29)
CREAT SERPL-MCNC: 1.4 MG/DL (ref 0.5–1.4)
CV STRESS BASE HR: 57 BPM
DIASTOLIC BLOOD PRESSURE: 94 MMHG
DIFFERENTIAL METHOD: ABNORMAL
EOSINOPHIL # BLD AUTO: 0.2 K/UL (ref 0–0.5)
EOSINOPHIL NFR BLD: 4.5 % (ref 0–8)
ERYTHROCYTE [DISTWIDTH] IN BLOOD BY AUTOMATED COUNT: 12.9 % (ref 11.5–14.5)
EST. GFR  (AFRICAN AMERICAN): >60 ML/MIN/1.73 M^2
EST. GFR  (NON AFRICAN AMERICAN): 55 ML/MIN/1.73 M^2
GLUCOSE SERPL-MCNC: 100 MG/DL (ref 70–110)
HCT VFR BLD AUTO: 28.1 % (ref 40–54)
HGB BLD-MCNC: 9.2 G/DL (ref 14–18)
LYMPHOCYTES # BLD AUTO: 1.5 K/UL (ref 1–4.8)
LYMPHOCYTES NFR BLD: 30 % (ref 18–48)
MAGNESIUM SERPL-MCNC: 1.9 MG/DL (ref 1.6–2.6)
MCH RBC QN AUTO: 26 PG (ref 27–31)
MCHC RBC AUTO-ENTMCNC: 32.7 G/DL (ref 32–36)
MCV RBC AUTO: 79 FL (ref 82–98)
MONOCYTES # BLD AUTO: 0.4 K/UL (ref 0.3–1)
MONOCYTES NFR BLD: 8.3 % (ref 4–15)
NEUTROPHILS # BLD AUTO: 2.7 K/UL (ref 1.8–7.7)
NEUTROPHILS NFR BLD: 56.4 % (ref 38–73)
OHS CV CPX 1 MINUTE RECOVERY HEART RATE: 111 BPM
OHS CV CPX 85 PERCENT MAX PREDICTED HEART RATE MALE: 138
OHS CV CPX ESTIMATED METS: 10
OHS CV CPX MAX PREDICTED HEART RATE: 162
OHS CV CPX PATIENT IS FEMALE: 0
OHS CV CPX PATIENT IS MALE: 1
OHS CV CPX PEAK DIASTOLIC BLOOD PRESSURE: 80 MMHG
OHS CV CPX PEAK HEAR RATE: 123 BPM
OHS CV CPX PEAK RATE PRESSURE PRODUCT: NORMAL
OHS CV CPX PEAK SYSTOLIC BLOOD PRESSURE: 200 MMHG
OHS CV CPX PERCENT MAX PREDICTED HEART RATE ACHIEVED: 76
OHS CV CPX RATE PRESSURE PRODUCT PRESENTING: 9405
PHOSPHATE SERPL-MCNC: 2.8 MG/DL (ref 2.7–4.5)
PLATELET # BLD AUTO: 222 K/UL (ref 150–350)
PMV BLD AUTO: 9.3 FL (ref 9.2–12.9)
POTASSIUM SERPL-SCNC: 3.4 MMOL/L (ref 3.5–5.1)
PROT SERPL-MCNC: 5.2 G/DL (ref 6–8.4)
RBC # BLD AUTO: 3.54 M/UL (ref 4.6–6.2)
SODIUM SERPL-SCNC: 140 MMOL/L (ref 136–145)
STRESS ECHO POST EXERCISE DUR MIN: 7 MINUTES
STRESS ECHO POST EXERCISE DUR SEC: 52 SECONDS
STRESS ST DEPRESSION: 0 MM
SYSTOLIC BLOOD PRESSURE: 165 MMHG
TROPONIN I SERPL DL<=0.01 NG/ML-MCNC: 0.03 NG/ML (ref 0–0.03)
WBC # BLD AUTO: 4.84 K/UL (ref 3.9–12.7)

## 2020-01-31 PROCEDURE — 93005 ELECTROCARDIOGRAM TRACING: CPT

## 2020-01-31 PROCEDURE — 25000003 PHARM REV CODE 250: Performed by: HOSPITALIST

## 2020-01-31 PROCEDURE — 83735 ASSAY OF MAGNESIUM: CPT

## 2020-01-31 PROCEDURE — 90471 IMMUNIZATION ADMIN: CPT | Performed by: FAMILY MEDICINE

## 2020-01-31 PROCEDURE — 85025 COMPLETE CBC W/AUTO DIFF WBC: CPT

## 2020-01-31 PROCEDURE — 94761 N-INVAS EAR/PLS OXIMETRY MLT: CPT

## 2020-01-31 PROCEDURE — G0378 HOSPITAL OBSERVATION PER HR: HCPCS

## 2020-01-31 PROCEDURE — 99214 PR OFFICE/OUTPT VISIT, EST, LEVL IV, 30-39 MIN: ICD-10-PCS | Mod: ,,, | Performed by: STUDENT IN AN ORGANIZED HEALTH CARE EDUCATION/TRAINING PROGRAM

## 2020-01-31 PROCEDURE — 84484 ASSAY OF TROPONIN QUANT: CPT

## 2020-01-31 PROCEDURE — 90670 PCV13 VACCINE IM: CPT | Performed by: FAMILY MEDICINE

## 2020-01-31 PROCEDURE — 80053 COMPREHEN METABOLIC PANEL: CPT

## 2020-01-31 PROCEDURE — 96361 HYDRATE IV INFUSION ADD-ON: CPT

## 2020-01-31 PROCEDURE — 99214 OFFICE O/P EST MOD 30 MIN: CPT | Mod: ,,, | Performed by: STUDENT IN AN ORGANIZED HEALTH CARE EDUCATION/TRAINING PROGRAM

## 2020-01-31 PROCEDURE — 36415 COLL VENOUS BLD VENIPUNCTURE: CPT

## 2020-01-31 PROCEDURE — 84100 ASSAY OF PHOSPHORUS: CPT

## 2020-01-31 PROCEDURE — 63600175 PHARM REV CODE 636 W HCPCS: Performed by: FAMILY MEDICINE

## 2020-01-31 PROCEDURE — 25000003 PHARM REV CODE 250: Performed by: NURSE PRACTITIONER

## 2020-01-31 RX ORDER — POTASSIUM CHLORIDE 20 MEQ/1
40 TABLET, EXTENDED RELEASE ORAL ONCE
Status: COMPLETED | OUTPATIENT
Start: 2020-01-31 | End: 2020-01-31

## 2020-01-31 RX ORDER — ENOXAPARIN SODIUM 100 MG/ML
40 INJECTION SUBCUTANEOUS EVERY 24 HOURS
Status: DISCONTINUED | OUTPATIENT
Start: 2020-01-31 | End: 2020-01-31 | Stop reason: HOSPADM

## 2020-01-31 RX ADMIN — METOPROLOL SUCCINATE 100 MG: 25 TABLET, FILM COATED, EXTENDED RELEASE ORAL at 10:01

## 2020-01-31 RX ADMIN — CLOPIDOGREL BISULFATE 75 MG: 75 TABLET ORAL at 10:01

## 2020-01-31 RX ADMIN — CILOSTAZOL 100 MG: 50 TABLET ORAL at 10:01

## 2020-01-31 RX ADMIN — LOSARTAN POTASSIUM 100 MG: 50 TABLET ORAL at 09:01

## 2020-01-31 RX ADMIN — POTASSIUM CHLORIDE 40 MEQ: 20 TABLET, EXTENDED RELEASE ORAL at 10:01

## 2020-01-31 RX ADMIN — ATORVASTATIN CALCIUM 80 MG: 40 TABLET, FILM COATED ORAL at 10:01

## 2020-01-31 RX ADMIN — PNEUMOCOCCAL 13-VALENT CONJUGATE VACCINE 0.5 ML: 2.2; 2.2; 2.2; 2.2; 2.2; 4.4; 2.2; 2.2; 2.2; 2.2; 2.2; 2.2; 2.2 INJECTION, SUSPENSION INTRAMUSCULAR at 05:01

## 2020-01-31 RX ADMIN — ASPIRIN 81 MG 81 MG: 81 TABLET ORAL at 10:01

## 2020-01-31 NOTE — TELEPHONE ENCOUNTER
----- Message from Darcie Gomez RN sent at 1/31/2020  3:27 PM CST -----  Hello,  Pt is discharging from [facility] today and will need hospital follow-up appt please.  Please contact pt to set up appt.  Thank you for your help,  Ha Gomez RN, CM  [phone]

## 2020-01-31 NOTE — TELEPHONE ENCOUNTER
----- Message from Kathy Babb sent at 2020  9:18 AM CST -----  Contact: [phone]/Janeth  CONSULTS:     Patient: JESUS BERRY SR.     :  1960    Clinic#: [[id number]]    Room number: 469    Referring MD: Carrie Vega NP    Diagnosis: Cardiac history chest pains     Person calling:   Janeth

## 2020-01-31 NOTE — ASSESSMENT & PLAN NOTE
10/2019 [facility]  Left Main   The vessel exhibits minimal luminal irregularities.   Left Anterior Descending   The vessel exhibits minimal luminal irregularities.   Ramus Intermedius   The vessel exhibits minimal luminal irregularities.   Left Circumflex   Prox Cx lesion is 99% stenosed. The lesion is ostial.   First Obtuse Marginal Branch   0% stenosed 1st Mrg lesion.     Lcx treated with REZA    Continue asa, plavix, statin, BB, ARB    CHAITANYA today for completeness given his reports of chest pain and mild troponin elevation

## 2020-01-31 NOTE — HPI
Domingo Gross is a 59 y.o. male with coronary artery disease, MI s/p stent placement in 10/19, hemothorax, hyperlipidemia, hypertension, and PAD. Patient presented to the ED due to diffuse, stabbing chest pain that lasted for 30 minutes with associated bilateral arm tingling which resolved spontaneously. His main concern is regarding his arm tingling.  He denies shortness of breath, palpitations, diaphoresis, nausea, dizziness, or other cardiac complaint. He reports compliance with his medications including DAPT. Troponin flat at 0.03,  EKG without acute ischemic changes. CHAITANYA pending.

## 2020-01-31 NOTE — PLAN OF CARE
Rounds completed on pt.  All questions addressed.  Bedside nurse to discuss d/c medications.  Discussed importance to attend all f/u appts and take medications as prescribed.  Verbalized understanding.    Follow up with Keo Jenkins MD   The office will call you to set up hospital follow-up appointment.  [address]   [address]   [place] [address]   [phone]            Follow up with Analy Encarnacion MD   The office will call you to set up hospital follow-up appointment.  [address]   [place] [address]   [phone]      Future Appointments   Date Time Provider Department Center   2/3/2020  3:30 PM REHAB, CARDIAC [facility] [place]   2/5/2020  3:30 PM REHAB, CARDIAC [facility] [place]   2/7/2020  3:30 PM REHAB, CARDIAC [facility] [place]   2/10/2020  3:30 PM REHAB, CARDIAC [facility] [place]   2/12/2020  3:30 PM REHAB, CARDIAC [facility] [place]   2/14/2020  3:30 PM REHAB, CARDIAC [facility] [place]   2/17/2020  3:30 PM REHAB, CARDIAC [facility] [place]   2/19/2020  3:30 PM REHAB, CARDIAC [facility] [place]   2/21/2020  3:30 PM REHAB, CARDIAC [facility] [place]   2/24/2020  3:30 PM REHAB, CARDIAC [facility] [place]   2/26/2020  3:30 PM REHAB, CARDIAC [facility] [place]   2/28/2020  3:30 PM REHAB, CARDIAC [facility] [place]   3/2/2020  3:30 PM REHAB, CARDIAC [facility] [place]   3/4/2020  3:30 PM REHAB, CARDIAC [facility] [place]   3/6/2020  3:30 PM REHAB, CARDIAC [facility] [place]   3/9/2020  3:30 PM REHAB, CARDIAC [facility] [place]   3/11/2020  3:30 PM REHAB, CARDIAC [facility] [place]   3/13/2020  3:30 PM REHAB, CARDIAC [facility] [place]   3/16/2020  3:30 PM REHAB, CARDIAC [facility] [place]   3/18/2020  3:30 PM REHAB, CARDIAC [facility] [place]   3/20/2020  3:30 PM REHAB, CARDIAC [facility] [place]   3/23/2020  3:30 PM REHAB, CARDIAC [facility] [place]        01/31/20 1553   Final Note   Assessment Type Final Discharge Note    Anticipated Discharge Disposition Home   Hospital Follow Up  Appt(s) scheduled? Yes   Discharge plans and expectations educations in teach back method with documentation complete? Yes   Right Care Referral Info   Post Acute Recommendation No Care     Ha Gomez RN,   [phone]

## 2020-01-31 NOTE — PLAN OF CARE
VN completed admission questions with patient. Plan of care was discussed including NPO status past midnight for cardiology consult.  Trending troponin labs discussed.  All questions answered.   Education given on safety measures and using call light before getting out of bed by himself. Patient verbalized understanding. Bed locked and in lowest position. Alarm on.

## 2020-01-31 NOTE — NURSING
VN note : cued into patients room and adjusted camera with patients permission. Patient discharged per MD orders. Patient verbalized understanding of discharge instructions, discharge medications, and discharge appointments.

## 2020-01-31 NOTE — NURSING
VN Round: Cued into patients room and adjusted camera with patients permission. Introduced myself as VN and explained I would be working with the bedside nurse. Bed low, locked and call bell within reach. Patient sitting at side of the bed. Patient verbalized understanding to call for any needs or assistance. No complaints of pain at this time. No questions at this time. Will continue to monitor patient.

## 2020-01-31 NOTE — PLAN OF CARE
Plan of care reviewed with patient, understanding verbalized.   SR on tele.  No complaints of CP overnight. Bed alarm on, call light in reach, fall precautions in place.

## 2020-01-31 NOTE — ASSESSMENT & PLAN NOTE
Chest pain atypical  Troponin flat at 0.03  EKG is without acute ischemic changes  Reports compliance with DAPT  CHAITANYA for completeness

## 2020-01-31 NOTE — ED NOTES
Pt to ED with c/o sharp chest pain across chest with left arm numbness x 15 mins PTA pt had MI in 2019.     Patient identifiers for Domingo Gross verified by spelling and stated name on armband along with .     APPEARANCE: Alert, oriented and in no acute distress.  CARDIAC: Normal rate and rhythm, no murmur heard. + Chest Pain  PERIPHERAL VASCULAR: peripheral pulses present. Normal cap refill. No edema. Warm to touch.    RESPIRATORY:Normal rate and effort, breath sounds clear bilaterally throughout chest. Respirations are equal and unlabored no obvious signs of distress.  GASTRO: soft, bowel sounds normal, no tenderness, no abdominal distention.  MUSC: Full ROM. + L arm numbness. No obvious deformity.  SKIN: Skin is warm and dry, normal skin turgor, mucous membranes moist.  MENTAL STATUS: awake, alert and aware of environment.

## 2020-01-31 NOTE — ASSESSMENT & PLAN NOTE
Coronary artery disease involving native coronary artery of native heart with angina pectoris with documented spasm  PAD (peripheral artery disease)  Hyperlipidemia LDL goal <70    Continue aspirin, plavix, statin and pletal

## 2020-01-31 NOTE — SUBJECTIVE & OBJECTIVE
Past Medical History:   Diagnosis Date    Allergy     Anticoagulant long-term use     Arthritis     Coronary artery disease     Heart attack 10/04/2019    Hemothorax     Hyperlipidemia     Hypertension     PAD (peripheral artery disease)        Past Surgical History:   Procedure Laterality Date    ABDOMINAL AORTOGRAPHY N/A 5/10/2019    Procedure: AORTOGRAM-ABDOMINAL;  Surgeon: Keo Jenkins MD;  Location: [facility] CATH LAB/EP;  Service: Cardiology;  Laterality: N/A;  RSFA intervention     CARDIAC CATHETERIZATION      CORONARY ANGIOGRAPHY N/A 3/29/2019    Procedure: ANGIOGRAM, CORONARY ARTERY;  Surgeon: Keo Jenkins MD;  Location: [facility] CATH LAB/EP;  Service: Cardiology;  Laterality: N/A;    CORONARY ANGIOGRAPHY Left 10/11/2019    Procedure: ANGIOGRAM, CORONARY ARTERY;  Surgeon: Keo Jenkins MD;  Location: [facility] CATH LAB/EP;  Service: Cardiology;  Laterality: Left;    CORONARY ANGIOPLASTY WITH STENT PLACEMENT  03/29/2019    mid and distal RCA    EYE SURGERY      LEFT HEART CATHETERIZATION N/A 3/29/2019    Procedure: Left heart cath;  Surgeon: Keo Jenkins MD;  Location: [facility] CATH LAB/EP;  Service: Cardiology;  Laterality: N/A;    LEFT HEART CATHETERIZATION Left 10/8/2019    Procedure: Left heart cath;  Surgeon: Keo Jenkins MD;  Location: [facility] CATH LAB/EP;  Service: Cardiology;  Laterality: Left;    PERCUTANEOUS TRANSLUMINAL ANGIOPLASTY (PTA) OF PERIPHERAL VESSEL N/A 7/12/2019    Procedure: PTA, PERIPHERAL VESSEL;  Surgeon: Keo Jenkins MD;  Location: [facility] CATH LAB/EP;  Service: Cardiology;  Laterality: N/A;       Review of patient's allergies indicates:   Allergen Reactions    Ace inhibitors Rash       No current facility-administered medications on file prior to encounter.      Current Outpatient Medications on File Prior to Encounter   Medication Sig    ASPIRIN (ASPIR-81 ORAL) Take 1 tablet by mouth.    atorvastatin (LIPITOR) 80 MG tablet Take 1 tablet (80 mg total) by mouth once  daily.    cilostazol (PLETAL) 100 MG Tab TAKE 1 TABLET(100 MG) BY MOUTH TWICE DAILY    clopidogrel (PLAVIX) 75 mg tablet Take 1 tablet (75 mg total) by mouth once daily.    coenzyme Q10 (COQ-10) 100 mg capsule Take 1 capsule (100 mg total) by mouth once daily.    hydroCHLOROthiazide (HYDRODIURIL) 25 MG tablet Take 1 tablet (25 mg total) by mouth once daily.    losartan (COZAAR) 100 MG tablet Take 1 tablet (100 mg total) by mouth once daily.    metoprolol succinate (TOPROL-XL) 100 MG 24 hr tablet Take 1 tablet (100 mg total) by mouth 2 (two) times daily.    albuterol (PROVENTIL/VENTOLIN HFA) 90 mcg/actuation inhaler INHALE 2 PUFFS INTO THE LUNGS EVERY 6 HOURS AS NEEDED FOR WHEEZING    flunisolide 25 mcg, 0.025%, (NASALIDE) 25 mcg (0.025 %) Spry 2 sprays by Nasal route 2 (two) times daily.     Family History     Problem Relation (Age of Onset)    Cancer Father        Tobacco Use    Smoking status: Former Smoker     Types: Cigarettes     Last attempt to quit: 1999     Years since quittin.7    Smokeless tobacco: Never Used   Substance and Sexual Activity    Alcohol use: No     Alcohol/week: 0.0 standard drinks     Frequency: Never     Drinks per session: Patient refused     Binge frequency: Patient refused    Drug use: No    Sexual activity: Yes     Partners: Female     Review of Systems   Constitution: Negative. Negative for diaphoresis.   HENT: Negative.    Eyes: Negative.    Cardiovascular: Positive for chest pain and claudication. Negative for dyspnea on exertion, irregular heartbeat, leg swelling, near-syncope, orthopnea, palpitations, paroxysmal nocturnal dyspnea and syncope.   Respiratory: Negative for cough, shortness of breath and wheezing.    Endocrine: Negative.    Hematologic/Lymphatic: Negative.    Skin: Negative.    Musculoskeletal: Negative.    Gastrointestinal: Negative.    Genitourinary: Negative.    Neurological: Positive for paresthesias. Negative for focal weakness,  light-headedness and weakness.   Psychiatric/Behavioral: Negative.    Allergic/Immunologic: Negative.      Objective:     Vital Signs (Most Recent):  Temp: 97.7 °F (36.5 °C) (01/31/20 0746)  Pulse: 60 (01/31/20 0746)  Resp: 18 (01/31/20 0746)  BP: (!) 154/81 (01/31/20 0746)  SpO2: 97 % (01/31/20 0748) Vital Signs (24h Range):  Temp:  [96.3 °F (35.7 °C)-98.1 °F (36.7 °C)] 97.7 °F (36.5 °C)  Pulse:  [60-78] 60  Resp:  [13-20] 18  SpO2:  [97 %-99 %] 97 %  BP: (139-187)/(73-90) 154/81     Weight: 83.4 kg (183 lb 13.8 oz)  Body mass index is 26.38 kg/m².    SpO2: 97 %  O2 Device (Oxygen Therapy): room air      Intake/Output Summary (Last 24 hours) at 1/31/2020 1055  Last data filed at 1/31/2020 0439  Gross per 24 hour   Intake 333.33 ml   Output 700 ml   Net -366.67 ml       Lines/Drains/Airways     Peripheral Intravenous Line                 Peripheral IV - Single Lumen 01/30/20 1936 20 G Right Hand less than 1 day                Physical Exam   Constitutional: He is oriented to person, place, and time. He appears well-developed and well-nourished. No distress.   HENT:   Head: Atraumatic.   Eyes: Right eye exhibits no discharge. Left eye exhibits no discharge.   Neck: No JVD present.   Cardiovascular: Normal rate, regular rhythm and normal heart sounds. Exam reveals no gallop and no friction rub.   No murmur heard.  Pulmonary/Chest: Effort normal and breath sounds normal. He has no wheezes. He has no rales. He exhibits no tenderness.   Abdominal: Soft. Bowel sounds are normal.   Musculoskeletal: He exhibits no edema.   Neurological: He is alert and oriented to person, place, and time.   Skin: Skin is warm and dry. He is not diaphoretic.   Psychiatric: He has a normal mood and affect. His behavior is normal. Judgment and thought content normal.       Significant Labs:   BMP:   Recent Labs   Lab 01/30/20 1957 01/31/20  0730   GLU 86 100    140   K 3.0* 3.4*    108   CO2 27 28   BUN 28* 24*   CREATININE 1.6*  1.4   CALCIUM 8.5* 8.4*   MG  --  1.9   , CMP   Recent Labs   Lab 01/30/20 1957 01/31/20  0730    140   K 3.0* 3.4*    108   CO2 27 28   GLU 86 100   BUN 28* 24*   CREATININE 1.6* 1.4   CALCIUM 8.5* 8.4*   PROT 5.7* 5.2*   ALBUMIN 2.7* 2.2*   BILITOT 0.2 0.2   ALKPHOS 84 75   AST 26 21   ALT 16 13   ANIONGAP 10 4*   ESTGFRAFRICA 54* >60   EGFRNONAA 47* 55*   , CBC   Recent Labs   Lab 01/30/20 1957 01/31/20  0730   WBC 5.95 4.84   HGB 10.4* 9.2*   HCT 31.1* 28.1*    222   , INR   Recent Labs   Lab 01/30/20 1957   INR 1.0   , Lipid Panel No results for input(s): CHOL, HDL, LDLCALC, TRIG, CHOLHDL in the last 48 hours., Troponin   Recent Labs   Lab 01/30/20 1957 01/30/20 2227 01/31/20  0730   TROPONINI 0.034* 0.035* 0.032*    and All pertinent lab results from the last 24 hours have been reviewed.    Significant Imaging: Echocardiogram:   Transthoracic echo (TTE) complete (Cupid Only):   Results for orders placed or performed during the hospital encounter of 10/04/19   Echo Color Flow Doppler? Yes   Result Value Ref Range    BSA 2.09 m2    LA WIDTH 4.10 cm    AORTIC VALVE CUSP SEPERATION 2.19 cm    PV PEAK VELOCITY 1.10 cm/s    LVIDD 4.60 3.5 - 6.0 cm    IVS 0.90 (A) 0.6 - 1.1 cm    PW 0.90 0.6 - 1.1 cm    Ao root annulus 3.54 cm    LVIDS 1.80 2.1 - 4.0 cm    FS 61 28 - 44 %    LV mass 137.72 g    LA size 2.43 cm    RVDD 2.90 cm    Left Ventricle Relative Wall Thickness 0.39 cm    AV mean gradient 3 mmHg    AV valve area 2.79 cm2    AV Velocity Ratio 0.88     AV index (prosthetic) 0.78     E/A ratio 2.20     E wave decelartion time 220.02 msec    IVRT 0.06 msec    Pulm vein S/D ratio 1.28     LVOT diameter 2.14 cm    LVOT area 3.6 cm2    LVOT peak jakob 0.98 m/s    LVOT peak VTI 18.97 cm    Ao peak jakob 1.12 m/s    Ao VTI 24.41 cm    LVOT stroke volume 68.20 cm3    AV peak gradient 5 mmHg    MV Peak E Jakob 1.08 m/s    TR Max Jakob 2.55 m/s    MV Peak A Jakob 0.49 m/s    PV Peak S Jakob 0.55 m/s    PV Peak  D Jakob 0.43 m/s    LV Systolic Volume 54.88 mL    LV Systolic Volume Index 26.6 mL/m2    LV Diastolic Volume 95.75 mL    LV Diastolic Volume Index 46.37 mL/m2    LV Mass Index 67 g/m2    RA Major Axis 3.90 cm    Left Atrium Major Axis 5.30 cm    Triscuspid Valve Regurgitation Peak Gradient 26 mmHg    Right Atrial Pressure (from IVC) 3 mmHg    LA volume 42.66 cm3    TV rest pulmonary artery pressure 29 mmHg    LA Volume Index 20.7 mL/m2    Left Atrium Minor Axis 4.80 cm    RA Width 3.20 cm    Narrative    · Normal left ventricular systolic function. The estimated ejection   fraction is 60%  · Normal LV diastolic function.  · Mild mitral regurgitation.  · The estimated PA systolic pressure is 29 mm Hg  · Normal central venous pressure (3 mm Hg).

## 2020-01-31 NOTE — HPI
59 y.o. male with a past medical history of allergy, arthritis, coronary artery disease, MI s/p stent placement in oct/2019, hemothorax, hyperlipidemia, hypertension, and PAD who presents to the ED due to mid sternal, stabbing, intermittent chest pain that started earlier today and has progressively worsened with associated left arm tingling which has since resolved. He denies shortness of breath, palpitations, sweating, leg swelling, nausea, vomiting, cough, congestion, fever or chills. ED findings: Potassium 3.0, creatine 1.6, troponin 0.034, no acute changes on chest x-ray, abnormal EKG with frequent PVCs.  Patient admitted to [facility] for further medical management and cardiology consultation.

## 2020-01-31 NOTE — PLAN OF CARE
Pt was not in the room at the time of assessment.  He was in a test.  I spoke with pt's wife and she states he is independent at home and pt drives.  Karmen will be available to transport pt home when discharged.  No needs identified.  His cardiologist is Dr. Jenkins.  White board updated with CM name and contact information.  Discharge brochure provided.  Pt encouraged to call with any questions or concerns.  Cm will continue to follow pt through transitions of care and assist with any discharge needs.     01/31/20 1156   Discharge Assessment   Assessment Type Discharge Planning Assessment   Confirmed/corrected address and phone number on facesheet? Yes   Assessment information obtained from? Caregiver   Communicated expected length of stay with patient/caregiver yes   Prior to hospitilization cognitive status: Alert/Oriented   Prior to hospitalization functional status: Independent   Current cognitive status: Alert/Oriented   Current Functional Status: Independent   Lives With spouse   Able to Return to Prior Arrangements yes   Is patient able to care for self after discharge? Yes   Who are your caregiver(s) and their phone number(s)? Karmen (pt's spouse) [phone]   Patient's perception of discharge disposition home or selfcare   Readmission Within the Last 30 Days no previous admission in last 30 days   Patient currently being followed by outpatient case management? No   Patient currently receives any other outside agency services? No   Equipment Currently Used at Home none   Do you have any problems affording any of your prescribed medications? No   Is the patient taking medications as prescribed? yes   Does the patient have transportation home? Yes   Transportation Anticipated family or friend will provide   Does the patient receive services at the [facility]? No   Discharge Plan A Home   Discharge Plan B Home with family   DME Needed Upon Discharge  none   Patient/Family in Agreement with Plan yes      Ha Gomez RN,   [phone]

## 2020-01-31 NOTE — PROGRESS NOTES
Pharmacist Renal Dose Adjustment Note    Domingo Gross . is a 59 y.o. male being treated with the medication enoxaparin    Patient Data:    Vital Signs (Most Recent):  Temp: 97.7 °F (36.5 °C) (01/31/20 0746)  Pulse: 60 (01/31/20 0746)  Resp: 18 (01/31/20 0746)  BP: (!) 154/81 (01/31/20 0746)  SpO2: 97 % (01/31/20 0748)   Vital Signs (72h Range):  Temp:  [96.3 °F (35.7 °C)-98.1 °F (36.7 °C)]   Pulse:  [60-78]   Resp:  [13-20]   BP: (139-187)/(73-90)   SpO2:  [97 %-99 %]      Recent Labs   Lab 01/30/20 1957 01/31/20 0730   CREATININE 1.6* 1.4     Serum creatinine: 1.4 mg/dL 01/31/20 0730  Estimated creatinine clearance: 59.4 mL/min    Medication:enoxaparin dose: 30mg frequency q24h will be changed to medication:enoxparin dose:40mg frequency:q24h    Pharmacist's Name: Gianfranco Perez  Pharmacist's Extension: [phone]

## 2020-01-31 NOTE — ASSESSMENT & PLAN NOTE
Continue to trend serial troponin every 6 hours x 3  Continuous cardiac monitoring  Continue aspirin  NPO p MN  Consult Cardiology  NTG prn  Morphine prn  Oxygen prn

## 2020-01-31 NOTE — ASSESSMENT & PLAN NOTE
IVFs  Voiding well  Avoid nephrotoxic agents  Renally dose medications  Strict I&Os  Monitor labs

## 2020-01-31 NOTE — H&P
[facility] Hospital Medicine  History & Physical    Patient Name: Domingo Gross Sr.  MRN: [id number]  Admission Date: 1/30/2020  Attending Physician: Dominique Muñoz*   Primary Care Provider: Analy Encarnacion MD         Patient information was obtained from patient and ER records.     Subjective:     Principal Problem:Chest pain    Chief Complaint:   Chief Complaint   Patient presents with    Chest Pain     59y M ambulatory to ED with c/o sharp chest pain across chest with left arm numbness [id number], pt had MI in October 2019        HPI: 59 y.o. male with a past medical history of allergy, arthritis, coronary artery disease, MI s/p stent placement in oct/2019, hemothorax, hyperlipidemia, hypertension, and PAD who presents to the ED due to mid sternal, stabbing, intermittent chest pain that started earlier today and has progressively worsened with associated left arm tingling which has since resolved. He denies shortness of breath, palpitations, sweating, leg swelling, nausea, vomiting, cough, congestion, fever or chills. ED findings: Potassium 3.0, creatine 1.6, troponin 0.034, no acute changes on chest x-ray, abnormal EKG with frequent PVCs.  Patient admitted to [facility] for further medical management and cardiology consultation.    Past Medical History:   Diagnosis Date    Allergy     Arthritis     Coronary artery disease     Heart attack 10/04/2019    Hemothorax     Hyperlipidemia     Hypertension     PAD (peripheral artery disease)        Past Surgical History:   Procedure Laterality Date    ABDOMINAL AORTOGRAPHY N/A 5/10/2019    Procedure: AORTOGRAM-ABDOMINAL;  Surgeon: Keo Jenkins MD;  Location: [facility] CATH LAB/EP;  Service: Cardiology;  Laterality: N/A;  RSFA intervention     CARDIAC CATHETERIZATION      CORONARY ANGIOGRAPHY N/A 3/29/2019    Procedure: ANGIOGRAM, CORONARY ARTERY;  Surgeon: Keo Jenkins MD;  Location: [facility] CATH LAB/EP;  Service: Cardiology;   Laterality: N/A;    CORONARY ANGIOGRAPHY Left 10/11/2019    Procedure: ANGIOGRAM, CORONARY ARTERY;  Surgeon: Keo Jenkins MD;  Location: [facility] CATH LAB/EP;  Service: Cardiology;  Laterality: Left;    CORONARY ANGIOPLASTY WITH STENT PLACEMENT  03/29/2019    mid and distal RCA    EYE SURGERY      LEFT HEART CATHETERIZATION N/A 3/29/2019    Procedure: Left heart cath;  Surgeon: Keo Jenkins MD;  Location: [facility] CATH LAB/EP;  Service: Cardiology;  Laterality: N/A;    LEFT HEART CATHETERIZATION Left 10/8/2019    Procedure: Left heart cath;  Surgeon: Keo Jenkins MD;  Location: [facility] CATH LAB/EP;  Service: Cardiology;  Laterality: Left;    PERCUTANEOUS TRANSLUMINAL ANGIOPLASTY (PTA) OF PERIPHERAL VESSEL N/A 7/12/2019    Procedure: PTA, PERIPHERAL VESSEL;  Surgeon: Keo Jenkins MD;  Location: [facility] CATH LAB/EP;  Service: Cardiology;  Laterality: N/A;       Review of patient's allergies indicates:   Allergen Reactions    Ace inhibitors Rash       No current facility-administered medications on file prior to encounter.      Current Outpatient Medications on File Prior to Encounter   Medication Sig    albuterol (PROVENTIL/VENTOLIN HFA) 90 mcg/actuation inhaler INHALE 2 PUFFS INTO THE LUNGS EVERY 6 HOURS AS NEEDED FOR WHEEZING    ASPIRIN (ASPIR-81 ORAL) Take 1 tablet by mouth.    atorvastatin (LIPITOR) 80 MG tablet Take 1 tablet (80 mg total) by mouth once daily.    cilostazol (PLETAL) 100 MG Tab TAKE 1 TABLET(100 MG) BY MOUTH TWICE DAILY    clopidogrel (PLAVIX) 75 mg tablet Take 1 tablet (75 mg total) by mouth once daily.    coenzyme Q10 (COQ-10) 100 mg capsule Take 1 capsule (100 mg total) by mouth once daily.    flunisolide 25 mcg, 0.025%, (NASALIDE) 25 mcg (0.025 %) Spry 2 sprays by Nasal route 2 (two) times daily.    hydroCHLOROthiazide (HYDRODIURIL) 25 MG tablet Take 1 tablet (25 mg total) by mouth once daily.    losartan (COZAAR) 100 MG tablet Take 1 tablet (100 mg total) by mouth once daily.     metoprolol succinate (TOPROL-XL) 100 MG 24 hr tablet Take 1 tablet (100 mg total) by mouth 2 (two) times daily.     Family History     Problem Relation (Age of Onset)    Cancer Father        Tobacco Use    Smoking status: Former Smoker     Types: Cigarettes     Last attempt to quit: 1999     Years since quittin.7    Smokeless tobacco: Never Used   Substance and Sexual Activity    Alcohol use: No     Alcohol/week: 0.0 standard drinks     Frequency: Never     Drinks per session: Patient refused     Binge frequency: Patient refused    Drug use: No    Sexual activity: Yes     Partners: Female     Review of Systems   Constitutional: Negative for chills and fever.   HENT: Negative for congestion and postnasal drip.    Eyes: Negative for visual disturbance.   Respiratory: Negative for chest tightness and shortness of breath.    Cardiovascular: Positive for chest pain.   Gastrointestinal: Negative for abdominal distention, abdominal pain, nausea and vomiting.   Genitourinary: Negative for difficulty urinating.   Musculoskeletal: Negative for arthralgias and myalgias.   Skin: Negative for color change and wound.   Neurological: Negative for dizziness and light-headedness.   Hematological: Does not bruise/bleed easily.   Psychiatric/Behavioral: Negative for agitation.     Objective:     Vital Signs (Most Recent):  Temp: 98 °F (36.7 °C) (20)  Pulse: 78 (20)  Resp: 13 (20)  BP: (!) 166/78 (20)  SpO2: 99 % (20) Vital Signs (24h Range):  Temp:  [98 °F (36.7 °C)-98.1 °F (36.7 °C)] 98 °F (36.7 °C)  Pulse:  [60-78] 78  Resp:  [13-17] 13  SpO2:  [99 %] 99 %  BP: (166-187)/(78-90) 166/78     Weight: 86.2 kg (190 lb)  Body mass index is 27.26 kg/m².    Physical Exam   Constitutional: He is oriented to person, place, and time. He appears well-developed and well-nourished.   HENT:   Head: Normocephalic and atraumatic.   Eyes: Pupils are equal, round, and reactive  to light. EOM are normal.   Neck: Normal range of motion. Neck supple.   Cardiovascular: Normal rate.   No murmur heard.  Pulmonary/Chest: Effort normal and breath sounds normal.   Abdominal: Soft. Bowel sounds are normal.   Musculoskeletal: Normal range of motion. He exhibits no edema or deformity.   Neurological: He is alert and oriented to person, place, and time.   Skin: Skin is warm and dry.   Psychiatric: He has a normal mood and affect.         CRANIAL NERVES     CN III, IV, VI   Pupils are equal, round, and reactive to light.  Extraocular motions are normal.        Significant Labs:   A1C:   Recent Labs   Lab 10/28/19  1016   HGBA1C 5.7*     BMP:   Recent Labs   Lab 01/30/20 1957   GLU 86      K 3.0*      CO2 27   BUN 28*   CREATININE 1.6*   CALCIUM 8.5*     CBC:   Recent Labs   Lab 01/30/20 1957   WBC 5.95   HGB 10.4*   HCT 31.1*        CMP:   Recent Labs   Lab 01/30/20 1957      K 3.0*      CO2 27   GLU 86   BUN 28*   CREATININE 1.6*   CALCIUM 8.5*   PROT 5.7*   ALBUMIN 2.7*   BILITOT 0.2   ALKPHOS 84   AST 26   ALT 16   ANIONGAP 10   EGFRNONAA 47*     Cardiac Markers:   Recent Labs   Lab 01/30/20 1957   BNP 67     Coagulation:   Recent Labs   Lab 01/30/20 1957   INR 1.0     Troponin:   Recent Labs   Lab 01/30/20 1957   TROPONINI 0.034*     TSH:   Recent Labs   Lab 10/05/19  0449   TSH 3.421       Significant Imaging: I have reviewed all pertinent imaging results/findings within the past 24 hours.    Assessment/Plan:     * Chest pain  Continue to trend serial troponin every 6 hours x 3  Continuous cardiac monitoring  Continue aspirin  NPO p MN  Consult Cardiology  NTG prn  Morphine prn  Oxygen prn      BRETT (acute kidney injury)  IVFs  Voiding well  Avoid nephrotoxic agents  Renally dose medications  Strict I&Os  Monitor labs        Hypokalemia  Replaced  monitor      Atherosclerosis of leg with intermittent claudication  Coronary artery disease involving native coronary  artery of native heart with angina pectoris with documented spasm  PAD (peripheral artery disease)  Hyperlipidemia LDL goal <70    Continue aspirin, plavix, statin and pletal     HTN (hypertension)  Continue HCTZ, Losartan, and Metoprolol        VTE Risk Mitigation (From admission, onward)         Ordered     enoxaparin injection 30 mg  Daily      01/30/20 2109     Place sequential compression device  Until discontinued      01/30/20 2109                   Carrie Vega NP  Department of Hospital Medicine   [facility]

## 2020-01-31 NOTE — CONSULTS
[facility]  Cardiology  Consult Note    Patient Name: Domingo Gross Sr.  MRN: [id number]  Admission Date: 1/30/2020  Hospital Length of Stay: 0 days  Code Status: Full Code   Attending Provider: Cedric Rhoades MD   Consulting Provider: Karlos Zimmerman NP  Primary Care Physician: Analy Encarnacion MD  Principal Problem:Chest pain    Patient information was obtained from patient, past medical records and ER records.     Inpatient consult to [facility]  Consult performed by: Karlos Zimmemran NP  Consult ordered by: Carrie Vega NP        Subjective:     Chief Complaint:  Chest Pain      HPI:   Domingo Gross is a 59 y.o. male with coronary artery disease, MI s/p stent placement in 10/19, hemothorax, hyperlipidemia, hypertension, and PAD. Patient presented to the ED due to diffuse, stabbing chest pain that lasted for 30 minutes with associated bilateral arm tingling which resolved spontaneously. His main concern is regarding his arm tingling.  He denies shortness of breath, palpitations, diaphoresis, nausea, dizziness, or other cardiac complaint. He reports compliance with his medications including DAPT. Troponin flat at 0.03,  EKG without acute ischemic changes. CHAITANYA pending.     Past Medical History:   Diagnosis Date    Allergy     Anticoagulant long-term use     Arthritis     Coronary artery disease     Heart attack 10/04/2019    Hemothorax     Hyperlipidemia     Hypertension     PAD (peripheral artery disease)        Past Surgical History:   Procedure Laterality Date    ABDOMINAL AORTOGRAPHY N/A 5/10/2019    Procedure: AORTOGRAM-ABDOMINAL;  Surgeon: Keo Jenkins MD;  Location: [facility] CATH LAB/EP;  Service: Cardiology;  Laterality: N/A;  RSFA intervention     CARDIAC CATHETERIZATION      CORONARY ANGIOGRAPHY N/A 3/29/2019    Procedure: ANGIOGRAM, CORONARY ARTERY;  Surgeon: Keo Jenkins MD;  Location: [facility] CATH LAB/EP;  Service: Cardiology;  Laterality: N/A;     CORONARY ANGIOGRAPHY Left 10/11/2019    Procedure: ANGIOGRAM, CORONARY ARTERY;  Surgeon: Keo Jenkins MD;  Location: [facility] CATH LAB/EP;  Service: Cardiology;  Laterality: Left;    CORONARY ANGIOPLASTY WITH STENT PLACEMENT  03/29/2019    mid and distal RCA    EYE SURGERY      LEFT HEART CATHETERIZATION N/A 3/29/2019    Procedure: Left heart cath;  Surgeon: Keo Jenkins MD;  Location: [facility] CATH LAB/EP;  Service: Cardiology;  Laterality: N/A;    LEFT HEART CATHETERIZATION Left 10/8/2019    Procedure: Left heart cath;  Surgeon: Keo Jenkins MD;  Location: [facility] CATH LAB/EP;  Service: Cardiology;  Laterality: Left;    PERCUTANEOUS TRANSLUMINAL ANGIOPLASTY (PTA) OF PERIPHERAL VESSEL N/A 7/12/2019    Procedure: PTA, PERIPHERAL VESSEL;  Surgeon: Keo Jenkins MD;  Location: [facility] CATH LAB/EP;  Service: Cardiology;  Laterality: N/A;       Review of patient's allergies indicates:   Allergen Reactions    Ace inhibitors Rash       No current facility-administered medications on file prior to encounter.      Current Outpatient Medications on File Prior to Encounter   Medication Sig    ASPIRIN (ASPIR-81 ORAL) Take 1 tablet by mouth.    atorvastatin (LIPITOR) 80 MG tablet Take 1 tablet (80 mg total) by mouth once daily.    cilostazol (PLETAL) 100 MG Tab TAKE 1 TABLET(100 MG) BY MOUTH TWICE DAILY    clopidogrel (PLAVIX) 75 mg tablet Take 1 tablet (75 mg total) by mouth once daily.    coenzyme Q10 (COQ-10) 100 mg capsule Take 1 capsule (100 mg total) by mouth once daily.    hydroCHLOROthiazide (HYDRODIURIL) 25 MG tablet Take 1 tablet (25 mg total) by mouth once daily.    losartan (COZAAR) 100 MG tablet Take 1 tablet (100 mg total) by mouth once daily.    metoprolol succinate (TOPROL-XL) 100 MG 24 hr tablet Take 1 tablet (100 mg total) by mouth 2 (two) times daily.    albuterol (PROVENTIL/VENTOLIN HFA) 90 mcg/actuation inhaler INHALE 2 PUFFS INTO THE LUNGS EVERY 6 HOURS AS NEEDED FOR WHEEZING    flunisolide 25  mcg, 0.025%, (NASALIDE) 25 mcg (0.025 %) Spry 2 sprays by Nasal route 2 (two) times daily.     Family History     Problem Relation (Age of Onset)    Cancer Father        Tobacco Use    Smoking status: Former Smoker     Types: Cigarettes     Last attempt to quit: 1999     Years since quittin.7    Smokeless tobacco: Never Used   Substance and Sexual Activity    Alcohol use: No     Alcohol/week: 0.0 standard drinks     Frequency: Never     Drinks per session: Patient refused     Binge frequency: Patient refused    Drug use: No    Sexual activity: Yes     Partners: Female     Review of Systems   Constitution: Negative. Negative for diaphoresis.   HENT: Negative.    Eyes: Negative.    Cardiovascular: Positive for chest pain and claudication. Negative for dyspnea on exertion, irregular heartbeat, leg swelling, near-syncope, orthopnea, palpitations, paroxysmal nocturnal dyspnea and syncope.   Respiratory: Negative for cough, shortness of breath and wheezing.    Endocrine: Negative.    Hematologic/Lymphatic: Negative.    Skin: Negative.    Musculoskeletal: Negative.    Gastrointestinal: Negative.    Genitourinary: Negative.    Neurological: Positive for paresthesias. Negative for focal weakness, light-headedness and weakness.   Psychiatric/Behavioral: Negative.    Allergic/Immunologic: Negative.      Objective:     Vital Signs (Most Recent):  Temp: 97.7 °F (36.5 °C) (20)  Pulse: 60 (20)  Resp: 18 (20)  BP: (!) 154/81 (20)  SpO2: 97 % (20 0748) Vital Signs (24h Range):  Temp:  [96.3 °F (35.7 °C)-98.1 °F (36.7 °C)] 97.7 °F (36.5 °C)  Pulse:  [60-78] 60  Resp:  [13-20] 18  SpO2:  [97 %-99 %] 97 %  BP: (139-187)/(73-90) 154/81     Weight: 83.4 kg (183 lb 13.8 oz)  Body mass index is 26.38 kg/m².    SpO2: 97 %  O2 Device (Oxygen Therapy): room air      Intake/Output Summary (Last 24 hours) at 2020 1056  Last data filed at 2020 0439  Gross per 24 hour    Intake 333.33 ml   Output 700 ml   Net -366.67 ml       Lines/Drains/Airways     Peripheral Intravenous Line                 Peripheral IV - Single Lumen 01/30/20 1936 20 G Right Hand less than 1 day                Physical Exam   Constitutional: He is oriented to person, place, and time. He appears well-developed and well-nourished. No distress.   HENT:   Head: Atraumatic.   Eyes: Right eye exhibits no discharge. Left eye exhibits no discharge.   Neck: No JVD present.   Cardiovascular: Normal rate, regular rhythm and normal heart sounds. Exam reveals no gallop and no friction rub.   No murmur heard.  Pulmonary/Chest: Effort normal and breath sounds normal. He has no wheezes. He has no rales. He exhibits no tenderness.   Abdominal: Soft. Bowel sounds are normal.   Musculoskeletal: He exhibits no edema.   Neurological: He is alert and oriented to person, place, and time.   Skin: Skin is warm and dry. He is not diaphoretic.   Psychiatric: He has a normal mood and affect. His behavior is normal. Judgment and thought content normal.       Significant Labs:   BMP:   Recent Labs   Lab 01/30/20 1957 01/31/20  0730   GLU 86 100    140   K 3.0* 3.4*    108   CO2 27 28   BUN 28* 24*   CREATININE 1.6* 1.4   CALCIUM 8.5* 8.4*   MG  --  1.9   , CMP   Recent Labs   Lab 01/30/20 1957 01/31/20  0730    140   K 3.0* 3.4*    108   CO2 27 28   GLU 86 100   BUN 28* 24*   CREATININE 1.6* 1.4   CALCIUM 8.5* 8.4*   PROT 5.7* 5.2*   ALBUMIN 2.7* 2.2*   BILITOT 0.2 0.2   ALKPHOS 84 75   AST 26 21   ALT 16 13   ANIONGAP 10 4*   ESTGFRAFRICA 54* >60   EGFRNONAA 47* 55*   , CBC   Recent Labs   Lab 01/30/20 1957 01/31/20  0730   WBC 5.95 4.84   HGB 10.4* 9.2*   HCT 31.1* 28.1*    222   , INR   Recent Labs   Lab 01/30/20 1957   INR 1.0   , Lipid Panel No results for input(s): CHOL, HDL, LDLCALC, TRIG, CHOLHDL in the last 48 hours., Troponin   Recent Labs   Lab 01/30/20 1957 01/30/20 2227 01/31/20  0772    TROPONINI 0.034* 0.035* 0.032*    and All pertinent lab results from the last 24 hours have been reviewed.    Significant Imaging: Echocardiogram:   Transthoracic echo (TTE) complete (Cupid Only):   Results for orders placed or performed during the hospital encounter of 10/04/19   Echo Color Flow Doppler? Yes   Result Value Ref Range    BSA 2.09 m2    LA WIDTH 4.10 cm    AORTIC VALVE CUSP SEPERATION 2.19 cm    PV PEAK VELOCITY 1.10 cm/s    LVIDD 4.60 3.5 - 6.0 cm    IVS 0.90 (A) 0.6 - 1.1 cm    PW 0.90 0.6 - 1.1 cm    Ao root annulus 3.54 cm    LVIDS 1.80 2.1 - 4.0 cm    FS 61 28 - 44 %    LV mass 137.72 g    LA size 2.43 cm    RVDD 2.90 cm    Left Ventricle Relative Wall Thickness 0.39 cm    AV mean gradient 3 mmHg    AV valve area 2.79 cm2    AV Velocity Ratio 0.88     AV index (prosthetic) 0.78     E/A ratio 2.20     E wave decelartion time 220.02 msec    IVRT 0.06 msec    Pulm vein S/D ratio 1.28     LVOT diameter 2.14 cm    LVOT area 3.6 cm2    LVOT peak jakob 0.98 m/s    LVOT peak VTI 18.97 cm    Ao peak jakob 1.12 m/s    Ao VTI 24.41 cm    LVOT stroke volume 68.20 cm3    AV peak gradient 5 mmHg    MV Peak E Jakob 1.08 m/s    TR Max Jakob 2.55 m/s    MV Peak A Jakob 0.49 m/s    PV Peak S Jakob 0.55 m/s    PV Peak D Jakob 0.43 m/s    LV Systolic Volume 54.88 mL    LV Systolic Volume Index 26.6 mL/m2    LV Diastolic Volume 95.75 mL    LV Diastolic Volume Index 46.37 mL/m2    LV Mass Index 67 g/m2    RA Major Axis 3.90 cm    Left Atrium Major Axis 5.30 cm    Triscuspid Valve Regurgitation Peak Gradient 26 mmHg    Right Atrial Pressure (from IVC) 3 mmHg    LA volume 42.66 cm3    TV rest pulmonary artery pressure 29 mmHg    LA Volume Index 20.7 mL/m2    Left Atrium Minor Axis 4.80 cm    RA Width 3.20 cm    Narrative    · Normal left ventricular systolic function. The estimated ejection   fraction is 60%  · Normal LV diastolic function.  · Mild mitral regurgitation.  · The estimated PA systolic pressure is 29 mm Hg  · Normal  central venous pressure (3 mm Hg).        Assessment and Plan:     * Chest pain  Chest pain atypical  Troponin flat at 0.03  EKG is without acute ischemic changes  Reports compliance with DAPT  CHAITANYA for completeness     Coronary artery disease involving native coronary artery of native heart with angina pectoris with documented spasm  10/2019 [facility]  Left Main   The vessel exhibits minimal luminal irregularities.   Left Anterior Descending   The vessel exhibits minimal luminal irregularities.   Ramus Intermedius   The vessel exhibits minimal luminal irregularities.   Left Circumflex   Prox Cx lesion is 99% stenosed. The lesion is ostial.   First Obtuse Marginal Branch   0% stenosed 1st Mrg lesion.     Lcx treated with REZA    Continue asa, plavix, statin, BB, ARB    CHAITANYA today for completeness given his reports of chest pain and mild troponin elevation     Atherosclerosis of leg with intermittent claudication  Stable without wounds  Continue Pletal, asa, statin, ARB        VTE Risk Mitigation (From admission, onward)         Ordered     enoxaparin injection 40 mg  Daily      01/31/20 0957     Place sequential compression device  Until discontinued      01/30/20 0675                Thank you for your consult. I will follow-up with patient. Please contact us if you have any additional questions.    Karlos Zimmerman, HELEN  Cardiology   [facility]

## 2020-01-31 NOTE — PLAN OF CARE
20 gauge PIV to right hand removed without difficulty, catheter intact- no redness or swelling noted to site at time of removal. Tele monitor removed, cleaned, and returned to telemetry bunker. D/C instructions and medications reviewed with patient per JAXSON Barcenas- verbalizes understanding. Patient to be transported home per spouse via personal vehicle to resume self care. NADN at time of D/C.

## 2020-01-31 NOTE — TELEPHONE ENCOUNTER
called pt to schedule hospital f/u, he was scheduled for 2/6/2020, pt verbalized an understanding.

## 2020-01-31 NOTE — ED PROVIDER NOTES
Encounter Date: 1/30/2020    SCRIBE #1 NOTE: I, Lydia Yoon, am scribing for, and in the presence of,  Dr. Ontiveros. I have scribed the entire note.       History     Chief Complaint   Patient presents with    Chest Pain     59y M ambulatory to ED with c/o sharp chest pain across chest with left arm numbness [id number], pt had MI in October 2019     Domingo Gross Sr. is a 59 y.o. male who  has a past medical history of Allergy, Arthritis, Coronary artery disease, Heart attack (10/04/2019), Hemothorax, Hyperlipidemia, Hypertension, and PAD (peripheral artery disease).    The patient presents to the ED due to chest pain. He reports onset of symptoms was several hours ago. The patient states the pain has been stabbing and intermittent throughout the day. However, this afternoon the patient's pain became significantly worse. He had left arm tingling which has since resolved. The patient denies any associated shortness of breath, palpitations, sweating, leg swelling, nausea, vomiting, cough, congestion, fever or chills. He has a history of stent placement and MI, October 2019. The patient has been compliant with his prescribed medications. The patient denies any recent travel or long car rides.     The history is provided by the patient.     Review of patient's allergies indicates:   Allergen Reactions    Ace inhibitors Rash     Past Medical History:   Diagnosis Date    Allergy     Anticoagulant long-term use     Arthritis     Coronary artery disease     Heart attack 10/04/2019    Hemothorax     Hyperlipidemia     Hypertension     PAD (peripheral artery disease)      Past Surgical History:   Procedure Laterality Date    ABDOMINAL AORTOGRAPHY N/A 5/10/2019    Procedure: AORTOGRAM-ABDOMINAL;  Surgeon: Keo Jenkins MD;  Location: [facility] CATH LAB/EP;  Service: Cardiology;  Laterality: N/A;  [facility] intervention     CARDIAC CATHETERIZATION      CORONARY ANGIOGRAPHY N/A 3/29/2019    Procedure: ANGIOGRAM,  CORONARY ARTERY;  Surgeon: Keo Jenkins MD;  Location: [facility] CATH LAB/EP;  Service: Cardiology;  Laterality: N/A;    CORONARY ANGIOGRAPHY Left 10/11/2019    Procedure: ANGIOGRAM, CORONARY ARTERY;  Surgeon: Keo Jenkins MD;  Location: [facility] CATH LAB/EP;  Service: Cardiology;  Laterality: Left;    CORONARY ANGIOPLASTY WITH STENT PLACEMENT  2019    mid and distal RCA    EYE SURGERY      LEFT HEART CATHETERIZATION N/A 3/29/2019    Procedure: Left heart cath;  Surgeon: Keo Jenkins MD;  Location: [facility] CATH LAB/EP;  Service: Cardiology;  Laterality: N/A;    LEFT HEART CATHETERIZATION Left 10/8/2019    Procedure: Left heart cath;  Surgeon: Keo Jenkins MD;  Location: [facility] CATH LAB/EP;  Service: Cardiology;  Laterality: Left;    PERCUTANEOUS TRANSLUMINAL ANGIOPLASTY (PTA) OF PERIPHERAL VESSEL N/A 2019    Procedure: PTA, PERIPHERAL VESSEL;  Surgeon: eKo Jenkins MD;  Location: [facility] CATH LAB/EP;  Service: Cardiology;  Laterality: N/A;     Family History   Problem Relation Age of Onset    Cancer Father      Social History     Tobacco Use    Smoking status: Former Smoker     Types: Cigarettes     Last attempt to quit: 1999     Years since quittin.7    Smokeless tobacco: Never Used   Substance Use Topics    Alcohol use: No     Alcohol/week: 0.0 standard drinks     Frequency: Never     Drinks per session: Patient refused     Binge frequency: Patient refused    Drug use: No     Review of Systems   Respiratory: Negative for shortness of breath.    Cardiovascular: Positive for chest pain.   Gastrointestinal: Negative for nausea and vomiting.   Neurological: Negative for numbness (left arm. resolved).   All other systems reviewed and are negative.      Physical Exam     Initial Vitals [20]   BP Pulse Resp Temp SpO2   (!) 187/85 62 16 98.1 °F (36.7 °C) 99 %      MAP       --         Physical Exam    Nursing note and vitals reviewed.  Constitutional: He appears well-developed and  well-nourished. He is not diaphoretic. No distress.   HENT:   Head: Normocephalic and atraumatic.   Mouth/Throat: Oropharynx is clear and moist.   Eyes: Conjunctivae and EOM are normal.   Neck: Normal range of motion. Neck supple.   Cardiovascular: Normal rate, regular rhythm and normal heart sounds. Exam reveals no gallop and no friction rub.    No murmur heard.  Pulmonary/Chest: Breath sounds normal. He has no wheezes. He has no rhonchi. He has no rales.   Abdominal: Soft. There is no tenderness. There is no rebound and no guarding.   Musculoskeletal: Normal range of motion. He exhibits no edema or tenderness.   Lymphadenopathy:     He has no cervical adenopathy.   Neurological: He is alert and oriented to person, place, and time. He has normal strength.   Skin: Skin is warm and dry. No rash noted.         ED Course   Procedures  Labs Reviewed   CBC W/ AUTO DIFFERENTIAL - Abnormal; Notable for the following components:       Result Value    RBC 3.92 (*)     Hemoglobin 10.4 (*)     Hematocrit 31.1 (*)     Mean Corpuscular Volume 79 (*)     Mean Corpuscular Hemoglobin 26.5 (*)     All other components within normal limits   COMPREHENSIVE METABOLIC PANEL - Abnormal; Notable for the following components:    Potassium 3.0 (*)     BUN, Bld 28 (*)     Creatinine 1.6 (*)     Calcium 8.5 (*)     Total Protein 5.7 (*)     Albumin 2.7 (*)     eGFR if  54 (*)     eGFR if non  47 (*)     All other components within normal limits   TROPONIN I - Abnormal; Notable for the following components:    Troponin I 0.034 (*)     All other components within normal limits   B-TYPE NATRIURETIC PEPTIDE   PROTIME-INR     EKG Readings: (Independently Interpreted)   Sinus rhythm at 63 bpm. Frequent PVC's. Non-specific ST changes. No obvious elevation. No STEMI       Imaging Results          X-Ray Chest PA And Lateral (Final result)  Result time 01/30/20 20:45:08    Final result by Jose Valero MD (01/30/20  20:45:08)                 Impression:      Postoperative changes with minimal bibasilar atelectasis, scarring or infiltrate.      Electronically signed by: Jose Jimenez  Date:    01/30/2020  Time:    20:45             Narrative:    EXAMINATION:  XR CHEST PA AND LATERAL    CLINICAL HISTORY:  Chest Pain;    TECHNIQUE:  PA and lateral views of the chest were performed.    COMPARISON:  10/04/2019    FINDINGS:  The lungs are clear, with normal appearance of pulmonary vasculature and no pleural effusion or pneumothorax.    The cardiac silhouette is normal in size. The hilar and mediastinal contours are unremarkable.    Bones are intact.    Postoperative changes near the right lung apex.    Minimal bibasilar atelectasis, scarring or infiltrate    Overall interval improvement in aeration from the prior study.                                 Medical Decision Making:   Initial Assessment:   This is a 59-year-old male, with multiple medical history including hypertension, coronary artery disease with recent MI status post 2 stents, peripheral artery disease, hyperlipidemia, presents the ER for evaluation of chest pain. Reports intermittent, sharp, midsternal, with associated left arm numbness.  No fever, chills, diaphoresis or shortness of breath.  Denies ever having symptoms with this before.  Overall well appearing, no acute distress, chest pain-free and left arm numbness free at this time.  EKG shows frequent PVCs, nonspecific ST changes without elevation.  Patient reports since his MI, he has been having frequent PVCs.  Differential includes atypical chest pain/ACS/NSTEMI, electrolyte abnormality, ventricular aneurysm, versus other cause.  Will obtain blood work 2 set troponin reassess.  Independently Interpreted Test(s):   I have ordered and independently interpreted EKG Reading(s) - see prior notes  Clinical Tests:   Lab Tests: Ordered and Reviewed  Radiological Study: Ordered and Reviewed  Medical Tests: Ordered and  Reviewed            Scribe Attestation:   Scribe #1: I performed the above scribed service and the documentation accurately describes the services I performed. I attest to the accuracy of the note.    Attending Attestation:           Physician Attestation for Scribe:  Physician Attestation Statement for Scribe #1: I, Dr. Ontiveros, reviewed documentation, as scribed by Lydia Yoon in my presence, and it is both accurate and complete.                 ED Course as of Jan 31 0048   Thu Jan 30, 2020 2057 Resting in bed, no acute distress, 1st set troponin slightly elevated.  Given patient's multiple risk factors, recent cardiac arrest, MI with 2 stents, will admit for observation.  Updated patient understands agrees with plan.    [SE]      ED Course User Index  [SE] Ling Ontiveros MD                Clinical Impression:     1. Chest pain    2. Elevated troponin      Disposition:   Disposition: Placed in Observation  Condition: Stable                     Ling Ontiveros MD  01/31/20 0048

## 2020-02-01 ENCOUNTER — PATIENT MESSAGE (OUTPATIENT)
Dept: CARDIOLOGY | Facility: CLINIC | Age: 60
End: 2020-02-01

## 2020-02-01 NOTE — DISCHARGE SUMMARY
[facility] Hospital Medicine  Discharge Summary      Patient Name: Domingo Gross Sr.  MRN: [id number]  Admission Date: 1/30/2020  Hospital Length of Stay: 0 days  Discharge Date and Time: 1/31/2020  5:59 PM  Attending Physician: Cedric Rhoades MD   Discharging Provider: Cedric Rhoades MD  Primary Care Provider: Analy Encarnacion MD      HPI:   59 y.o. male with a past medical history of allergy, arthritis, coronary artery disease, MI s/p stent placement in oct/2019, hemothorax, hyperlipidemia, hypertension, and PAD who presents to the ED due to mid sternal, stabbing, intermittent chest pain that started earlier today and has progressively worsened with associated left arm tingling which has since resolved. He denies shortness of breath, palpitations, sweating, leg swelling, nausea, vomiting, cough, congestion, fever or chills. ED findings: Potassium 3.0, creatine 1.6, troponin 0.034, no acute changes on chest x-ray, abnormal EKG with frequent PVCs.  Patient admitted to [facility] for further medical management and cardiology consultation.    * No surgery found *      Hospital Course:   Troponin peak was 0.035 and trended down to 0.032. Cardiology was consulted and ordered exercise stress echo with results below. Cardiology will arrange for outpatient stress test. He will hold his HCTZ on discharge and continue his other home medications.     Exercise Stress Echo  · The ECG portion of this study is abnormal but not diagnostic.  · There was lack of appropriate hypercontractile response with stress.  · There is resting wall motion abnormalities of the inferior wall: - hypokinetic at stress and rest.  · During stress, the following significant arrhythmias were observed: rare PVCs.  · Sensitivity is impaired due to the patient's failure to achieve target heart rate.  · The patient's exercise capacity was above average.  · Left ventricular systolic function. The estimated ejection  fraction is 50%.  The test was stopped because the patient experienced fatigue, shortness of breath and atypical leg pain.     Consults:   Consults (From admission, onward)        Status Ordering Provider     Inpatient consult to [facility]  Once     Provider:  (Not yet assigned)    ESTELA Sommer          No new Assessment & Plan notes have been filed under this hospital service since the last note was generated.  Service: Hospital Medicine    Final Active Diagnoses:    Diagnosis Date Noted POA    PRINCIPAL PROBLEM:  Chest pain [R07.9] 01/30/2020 Yes    Hypokalemia [E87.6] 10/04/2019 Yes    Coronary artery disease involving native coronary artery of native heart with angina pectoris with documented spasm [I25.111] 03/29/2019 Yes    Hyperlipidemia LDL goal <70 [E78.5] 06/12/2015 Yes    PAD (peripheral artery disease) [I73.9] 05/21/2014 Yes    Atherosclerosis of leg with intermittent claudication [I70.219] 04/21/2014 Yes    HTN (hypertension) [I10] 04/29/2013 Yes      Problems Resolved During this Admission:    Diagnosis Date Noted Date Resolved POA    BRETT (acute kidney injury) [N17.9] 01/30/2020 01/31/2020 Yes       Discharged Condition: good    Disposition: Home or Self Care    Follow Up:  Follow-up Information     Keo Jenkins MD.    Specialties:  INTERVENTIONAL CARDIOLOGY, Cardiology  Why:  The office will call you to set up hospital follow-up appointment.  Contact information:  [address]  [place] [address]  [place] [address]  [phone]             Analy Encarnacion MD.    Specialty:  Internal Medicine  Why:  The office will call you to set up hospital follow-up appointment.  Contact information:  [address] [facility]  [place] [id number] [phone]                 Patient Instructions:      Diet Cardiac     Notify your health care provider if you experience any of the following:  temperature >100.4     Notify your health care provider if you experience any of the following:  severe  uncontrolled pain     Notify your health care provider if you experience any of the following:  difficulty breathing or increased cough     Notify your health care provider if you experience any of the following:  persistent dizziness, light-headedness, or visual disturbances     Activity as tolerated       Significant Diagnostic Studies: Labs:   CMP   Recent Labs   Lab 01/30/20 1957 01/31/20  0730    140   K 3.0* 3.4*    108   CO2 27 28   GLU 86 100   BUN 28* 24*   CREATININE 1.6* 1.4   CALCIUM 8.5* 8.4*   PROT 5.7* 5.2*   ALBUMIN 2.7* 2.2*   BILITOT 0.2 0.2   ALKPHOS 84 75   AST 26 21   ALT 16 13   ANIONGAP 10 4*   ESTGFRAFRICA 54* >60   EGFRNONAA 47* 55*   , CBC   Recent Labs   Lab 01/30/20 1957 01/31/20  0730   WBC 5.95 4.84   HGB 10.4* 9.2*   HCT 31.1* 28.1*    222    and Troponin   Recent Labs   Lab 01/31/20 0730   TROPONINI 0.032*       Pending Diagnostic Studies:     None         Medications:  Reconciled Home Medications:      Medication List      CONTINUE taking these medications    albuterol 90 mcg/actuation inhaler  Commonly known as:  PROVENTIL/VENTOLIN HFA  INHALE 2 PUFFS INTO THE LUNGS EVERY 6 HOURS AS NEEDED FOR WHEEZING     ASPIR-81 ORAL  Take 1 tablet by mouth.     atorvastatin 80 MG tablet  Commonly known as:  LIPITOR  Take 1 tablet (80 mg total) by mouth once daily.     cilostazoL 100 MG Tab  Commonly known as:  PLETAL  TAKE 1 TABLET(100 MG) BY MOUTH TWICE DAILY     clopidogreL 75 mg tablet  Commonly known as:  PLAVIX  Take 1 tablet (75 mg total) by mouth once daily.     coenzyme Q10 100 mg capsule  Commonly known as:  CoQ-10  Take 1 capsule (100 mg total) by mouth once daily.     flunisolide 25 mcg (0.025%) 25 mcg (0.025 %) Spry  Commonly known as:  NASALIDE  2 sprays by Nasal route 2 (two) times daily.     losartan 100 MG tablet  Commonly known as:  COZAAR  Take 1 tablet (100 mg total) by mouth once daily.     metoprolol succinate 100 MG 24 hr tablet  Commonly known as:   TOPROL-XL  Take 1 tablet (100 mg total) by mouth 2 (two) times daily.        STOP taking these medications    hydroCHLOROthiazide 25 MG tablet  Commonly known as:  HYDRODIURIL            Indwelling Lines/Drains at time of discharge:   Lines/Drains/Airways     None                 Time spent on the discharge of patient: 35 minutes  Patient was seen and examined on the date of discharge and determined to be suitable for discharge.         Cedric Rhoades MD  Department of Hospital Medicine  [facility]

## 2020-02-01 NOTE — HOSPITAL COURSE
Troponin peak was 0.035 and trended down to 0.032. Cardiology was consulted and ordered exercise stress echo with results below. Cardiology will arrange for outpatient stress test. He will hold his HCTZ on discharge and continue his other home medications.     Exercise Stress Echo  · The ECG portion of this study is abnormal but not diagnostic.  · There was lack of appropriate hypercontractile response with stress.  · There is resting wall motion abnormalities of the inferior wall: - hypokinetic at stress and rest.  · During stress, the following significant arrhythmias were observed: rare PVCs.  · Sensitivity is impaired due to the patient's failure to achieve target heart rate.  · The patient's exercise capacity was above average.  · Left ventricular systolic function. The estimated ejection fraction is 50%.  · The test was stopped because the patient experienced fatigue, shortness of breath and atypical leg pain.

## 2020-02-03 ENCOUNTER — TELEPHONE (OUTPATIENT)
Dept: CARDIOLOGY | Facility: CLINIC | Age: 60
End: 2020-02-03

## 2020-02-03 ENCOUNTER — PATIENT MESSAGE (OUTPATIENT)
Dept: CARDIOLOGY | Facility: CLINIC | Age: 60
End: 2020-02-03

## 2020-02-03 DIAGNOSIS — I25.10 CORONARY ARTERY DISEASE, ANGINA PRESENCE UNSPECIFIED, UNSPECIFIED VESSEL OR LESION TYPE, UNSPECIFIED WHETHER NATIVE OR TRANSPLANTED HEART: Primary | ICD-10-CM

## 2020-02-03 NOTE — TELEPHONE ENCOUNTER
Please order a PET stress for him       He also needs a return to work note      Ask what date      Thanks      ZN

## 2020-02-03 NOTE — TELEPHONE ENCOUNTER
Garcia Gross,    Melisa returning your call about your return to work note.    What date were you looking to return.    And also  would like you to do a PET Stress Test.    This test is only done at [facility].    Please call me back so I can help you schedule the PET Stress at [facility].        Thank you--      Madina Wick (rita).

## 2020-02-05 ENCOUNTER — PATIENT MESSAGE (OUTPATIENT)
Dept: CARDIOLOGY | Facility: CLINIC | Age: 60
End: 2020-02-05

## 2020-02-09 ENCOUNTER — PATIENT MESSAGE (OUTPATIENT)
Dept: CARDIOLOGY | Facility: CLINIC | Age: 60
End: 2020-02-09

## 2020-02-11 ENCOUNTER — TELEPHONE (OUTPATIENT)
Dept: CARDIOLOGY | Facility: CLINIC | Age: 60
End: 2020-02-11

## 2020-02-11 NOTE — TELEPHONE ENCOUNTER
I called and spoke to .    Mr. Gross you have not been Cleared to return for work yet ,due to you have to do A Pet Stress 2-,    for  to see If you are gong to be needing an additional Surgery pending on the PET Stress.    Once all is done  can clear you to return back to work.    Keep in touch with me.      Madina Wick (Rita).

## 2020-02-12 ENCOUNTER — TELEPHONE (OUTPATIENT)
Dept: CARDIOLOGY | Facility: CLINIC | Age: 60
End: 2020-02-12

## 2020-02-12 RX ORDER — VALSARTAN 320 MG/1
320 TABLET ORAL DAILY
Qty: 90 TABLET | Refills: 1 | Status: SHIPPED | OUTPATIENT
Start: 2020-02-12 | End: 2020-02-23 | Stop reason: SDUPTHER

## 2020-02-12 NOTE — PROGRESS NOTES
Digital Medicine: Clinician Follow-Up    Called patient as received ALERT about elevated BP.  He denies signs or symptoms of elevated BP: headache, change in vision, numbness/tingling on one side, chest pain or shortness of breath. Seen in the ED on 1/30 for chest pain and hypokalemia. Hydrochlorothiazide was discontinued and patient supplement. He reports shortness of breath when walking long distances, but this has been ongoing since his first heart attack. Patient is scheduled to have a stress test on 2/14/2020.       The history is provided by the patient. No  was used.     Follow Up  Follow-up reason(s): medication change      Medication Change: alternative therapy            Assessment:  Patient's current 30-day average is not at goal of <130/80 mmHg.       Plan:  Losartan changed to valsartan.  Start with one-half tablet (160 mg) for 1 week then take full tablet the second week.  Consider adding amlodipine if BP remains uncontrolled after maximizing valsartan.   Patients health , Crystal Nair, will follow-up as scheduled.    I will continue to monitor regularly and will follow-up in 2 weeks, sooner if blood pressure begins to trend upward or downward.       Patient has my contact information and knows to call with any concerns or clinical changes.            There are no preventive care reminders to display for this patient.    Last 5 Patient Entered Readings                                      Current 30 Day Average: 158/80     Recent Readings 2/12/2020 2/11/2020 2/10/2020 2/6/2020 2/5/2020    SBP (mmHg) 191 170 188 170 179    DBP (mmHg) 89 99 99 88 97    Pulse 69 73 69 65 64           Hypertension Medications             metoprolol succinate (TOPROL-XL) 100 MG 24 hr tablet Take 1 tablet (100 mg total) by mouth 2 (two) times daily.    valsartan (DIOVAN) 320 MG tablet Take 1 tablet (320 mg total) by mouth once daily.

## 2020-02-14 ENCOUNTER — CLINICAL SUPPORT (OUTPATIENT)
Dept: CARDIOLOGY | Facility: CLINIC | Age: 60
End: 2020-02-14
Attending: INTERNAL MEDICINE
Payer: COMMERCIAL

## 2020-02-14 VITALS — SYSTOLIC BLOOD PRESSURE: 167 MMHG | DIASTOLIC BLOOD PRESSURE: 78 MMHG | HEART RATE: 71 BPM

## 2020-02-14 DIAGNOSIS — I25.10 CORONARY ARTERY DISEASE, ANGINA PRESENCE UNSPECIFIED, UNSPECIFIED VESSEL OR LESION TYPE, UNSPECIFIED WHETHER NATIVE OR TRANSPLANTED HEART: ICD-10-CM

## 2020-02-14 LAB
CFR FLOW - ANTERIOR: 1.28
CFR FLOW - INFERIOR: 1.31
CFR FLOW - LATERAL: 1.6
CFR FLOW - MAX: 2.22
CFR FLOW - MIN: 0.99
CFR FLOW - SEPTAL: 1.32
CFR FLOW - WHOLE HEART: 1.38
CV PHARM DOSE: 46.6 MG
CV STRESS BASE HR: 68 BPM
DIASTOLIC BLOOD PRESSURE: 77 MMHG
END DIASTOLIC INDEX-HIGH: 170 ML/M2
END SYSTOLIC INDEX-HIGH: 70 ML/M2
NUC REST DIASTOLIC VOLUME INDEX: 100
NUC REST EJECTION FRACTION: 47
NUC REST SYSTOLIC VOLUME INDEX: 53
NUC STRESS DIASTOLIC VOLUME INDEX: 122
NUC STRESS EJECTION FRACTION: 52 %
NUC STRESS SYSTOLIC VOLUME INDEX: 58
OHS CV CPX 85 PERCENT MAX PREDICTED HEART RATE MALE: 138
OHS CV CPX MAX PREDICTED HEART RATE: 162
OHS CV CPX PATIENT IS FEMALE: 0
OHS CV CPX PATIENT IS MALE: 1
OHS CV CPX PEAK DIASTOLIC BLOOD PRESSURE: 75 MMHG
OHS CV CPX PEAK HEAR RATE: 71 BPM
OHS CV CPX PEAK RATE PRESSURE PRODUCT: 9869
OHS CV CPX PEAK SYSTOLIC BLOOD PRESSURE: 139 MMHG
OHS CV CPX PERCENT MAX PREDICTED HEART RATE ACHIEVED: 44
OHS CV CPX RATE PRESSURE PRODUCT PRESENTING: 8908
REST FLOW - ANTERIOR: 1.44 CC/MIN/G
REST FLOW - INFERIOR: 1.24 CC/MIN/G
REST FLOW - LATERAL: 1.23 CC/MIN/G
REST FLOW - MAX: 1.8 CC/MIN/G
REST FLOW - MIN: 0.9 CC/MIN/G
REST FLOW - SEPTAL: 1.18 CC/MIN/G
REST FLOW - WHOLE HEART: 1.27 CC/MIN/G
RETIRED EF AND QEF - SEE NOTES: 51 %
STRESS ECHO TARGET HR: 138 BPM
STRESS FLOW - ANTERIOR: 1.84 CC/MIN/G
STRESS FLOW - INFERIOR: 1.61 CC/MIN/G
STRESS FLOW - LATERAL: 1.94 CC/MIN/G
STRESS FLOW - MAX: 2.3 CC/MIN/G
STRESS FLOW - MIN: 1.1 CC/MIN/G
STRESS FLOW - SEPTAL: 1.55 CC/MIN/G
STRESS FLOW - WHOLE HEART: 1.74 CC/MIN/G
STRESS ST DEPRESSION: 1 MM
SYSTOLIC BLOOD PRESSURE: 131 MMHG

## 2020-02-14 PROCEDURE — 78492 MYOCRD IMG PET MLT RST&STRS: CPT | Mod: S$GLB,,, | Performed by: INTERNAL MEDICINE

## 2020-02-14 PROCEDURE — 93015 CARDIAC PET SCAN STRESS (CUPID ONLY): ICD-10-PCS | Mod: S$GLB,,, | Performed by: INTERNAL MEDICINE

## 2020-02-14 PROCEDURE — A9555 RB82 RUBIDIUM: HCPCS | Mod: S$GLB,,, | Performed by: INTERNAL MEDICINE

## 2020-02-14 PROCEDURE — 93015 CV STRESS TEST SUPVJ I&R: CPT | Mod: S$GLB,,, | Performed by: INTERNAL MEDICINE

## 2020-02-14 PROCEDURE — 99999 PR PBB SHADOW E&M-EST. PATIENT-LVL II: ICD-10-PCS | Mod: PBBFAC,,,

## 2020-02-14 PROCEDURE — 78492 CARDIAC PET SCAN STRESS (CUPID ONLY): ICD-10-PCS | Mod: S$GLB,,, | Performed by: INTERNAL MEDICINE

## 2020-02-14 PROCEDURE — A9555 CARDIAC PET SCAN STRESS (CUPID ONLY): ICD-10-PCS | Mod: S$GLB,,, | Performed by: INTERNAL MEDICINE

## 2020-02-14 PROCEDURE — 99999 PR PBB SHADOW E&M-EST. PATIENT-LVL II: CPT | Mod: PBBFAC,,,

## 2020-02-14 RX ORDER — AMINOPHYLLINE 25 MG/ML
75 INJECTION, SOLUTION INTRAVENOUS
Status: COMPLETED | OUTPATIENT
Start: 2020-02-14 | End: 2020-02-14

## 2020-02-14 RX ORDER — DIPYRIDAMOLE 5 MG/ML
46.58 INJECTION INTRAVENOUS
Status: COMPLETED | OUTPATIENT
Start: 2020-02-14 | End: 2020-02-14

## 2020-02-14 RX ADMIN — AMINOPHYLLINE 75 MG: 25 INJECTION, SOLUTION INTRAVENOUS at 11:02

## 2020-02-14 RX ADMIN — DIPYRIDAMOLE 46.6 MG: 5 INJECTION INTRAVENOUS at 10:02

## 2020-02-17 ENCOUNTER — PATIENT MESSAGE (OUTPATIENT)
Dept: CARDIOLOGY | Facility: CLINIC | Age: 60
End: 2020-02-17

## 2020-02-17 ENCOUNTER — PATIENT OUTREACH (OUTPATIENT)
Dept: OTHER | Facility: OTHER | Age: 60
End: 2020-02-17

## 2020-02-18 ENCOUNTER — PATIENT MESSAGE (OUTPATIENT)
Dept: CARDIOLOGY | Facility: CLINIC | Age: 60
End: 2020-02-18

## 2020-02-19 ENCOUNTER — TELEPHONE (OUTPATIENT)
Dept: CARDIOLOGY | Facility: HOSPITAL | Age: 60
End: 2020-02-19

## 2020-02-20 NOTE — TELEPHONE ENCOUNTER
Garcia Neil        Please call patient         His PET stress test did not reveal any reversible ischemia        We will continue with medical therapy         Thanks        ZN

## 2020-02-20 NOTE — TELEPHONE ENCOUNTER
----- Message from Madina Wick MA sent at 2/19/2020  3:23 PM CST -----  Dear Nurse Rustam BOLTON calling Regarding Pet stress 2-14-20.      NW        ----- Message -----  From: Krystal Easton RN  Sent: 2/19/2020   1:58 PM CST  To: Gregory KYLE Staff    Dr. Jenkins,  Mr. Gross was requesting that someone contact him regarding his PET test results.  The test was performed on 2/14/2020.  He is currently on hold from Phase II cardiac rehab.  [place] be contacted at [phone] with any questions.  Thanks,  Krystal Easton RN  Cardiac Rehab Nurse     [address] INTERDISCIPLINARY ROUNDS    Cardiopulmonary Care Interdisciplinary Rounds were held today to discuss patient's plan of care and outcomes. The following members were present: NP/Physician, Pharmacy, Nursing and Case Management.   Expected Length of Stay:  4d 2h    PLAN OF CARE:   Continue current treatment plan  DC JACOBO TOMORROW

## 2020-02-23 ENCOUNTER — HOSPITAL ENCOUNTER (EMERGENCY)
Facility: HOSPITAL | Age: 60
Discharge: HOME | End: 2020-02-24
Attending: EMERGENCY MEDICINE
Payer: COMMERCIAL

## 2020-02-23 ENCOUNTER — PATIENT MESSAGE (OUTPATIENT)
Dept: ADMINISTRATIVE | Facility: OTHER | Age: 60
End: 2020-02-23

## 2020-02-23 ENCOUNTER — NURSE TRIAGE (OUTPATIENT)
Dept: ADMINISTRATIVE | Facility: CLINIC | Age: 60
End: 2020-02-23

## 2020-02-23 VITALS
RESPIRATION RATE: 19 BRPM | DIASTOLIC BLOOD PRESSURE: 80 MMHG | SYSTOLIC BLOOD PRESSURE: 170 MMHG | TEMPERATURE: 98 F | BODY MASS INDEX: 27.26 KG/M2 | HEART RATE: 69 BPM | WEIGHT: 190 LBS | OXYGEN SATURATION: 98 %

## 2020-02-23 DIAGNOSIS — I10 HYPERTENSION: ICD-10-CM

## 2020-02-23 DIAGNOSIS — R51.9 ACUTE NONINTRACTABLE HEADACHE, UNSPECIFIED HEADACHE TYPE: Primary | ICD-10-CM

## 2020-02-23 DIAGNOSIS — I10 ESSENTIAL HYPERTENSION: ICD-10-CM

## 2020-02-23 LAB
ALBUMIN SERPL BCP-MCNC: 1.8 G/DL (ref 3.5–5.2)
ALP SERPL-CCNC: 79 U/L (ref 55–135)
ALT SERPL W/O P-5'-P-CCNC: 13 U/L (ref 10–44)
ANION GAP SERPL CALC-SCNC: 6 MMOL/L (ref 8–16)
AST SERPL-CCNC: 20 U/L (ref 10–40)
BACTERIA #/AREA URNS HPF: NORMAL /HPF
BASOPHILS # BLD AUTO: 0.04 K/UL (ref 0–0.2)
BASOPHILS NFR BLD: 0.6 % (ref 0–1.9)
BILIRUB SERPL-MCNC: 0.2 MG/DL (ref 0.1–1)
BILIRUB UR QL STRIP: NEGATIVE
BUN SERPL-MCNC: 11 MG/DL (ref 6–20)
CALCIUM SERPL-MCNC: 7.6 MG/DL (ref 8.7–10.5)
CHLORIDE SERPL-SCNC: 110 MMOL/L (ref 95–110)
CLARITY UR: CLEAR
CO2 SERPL-SCNC: 25 MMOL/L (ref 23–29)
COLOR UR: YELLOW
CREAT SERPL-MCNC: 1.4 MG/DL (ref 0.5–1.4)
DIFFERENTIAL METHOD: ABNORMAL
EOSINOPHIL # BLD AUTO: 0.2 K/UL (ref 0–0.5)
EOSINOPHIL NFR BLD: 3.4 % (ref 0–8)
ERYTHROCYTE [DISTWIDTH] IN BLOOD BY AUTOMATED COUNT: 13.3 % (ref 11.5–14.5)
EST. GFR  (AFRICAN AMERICAN): >60 ML/MIN/1.73 M^2
EST. GFR  (NON AFRICAN AMERICAN): 55 ML/MIN/1.73 M^2
GLUCOSE SERPL-MCNC: 95 MG/DL (ref 70–110)
GLUCOSE UR QL STRIP: NEGATIVE
HCT VFR BLD AUTO: 26.8 % (ref 40–54)
HGB BLD-MCNC: 8.8 G/DL (ref 14–18)
HGB UR QL STRIP: ABNORMAL
HYALINE CASTS #/AREA URNS LPF: 1 /LPF
IMM GRANULOCYTES # BLD AUTO: 0.02 K/UL (ref 0–0.04)
IMM GRANULOCYTES NFR BLD AUTO: 0.3 % (ref 0–0.5)
KETONES UR QL STRIP: NEGATIVE
LEUKOCYTE ESTERASE UR QL STRIP: NEGATIVE
LYMPHOCYTES # BLD AUTO: 1.6 K/UL (ref 1–4.8)
LYMPHOCYTES NFR BLD: 23.1 % (ref 18–48)
MAGNESIUM SERPL-MCNC: 1.9 MG/DL (ref 1.6–2.6)
MCH RBC QN AUTO: 26.4 PG (ref 27–31)
MCHC RBC AUTO-ENTMCNC: 32.8 G/DL (ref 32–36)
MCV RBC AUTO: 81 FL (ref 82–98)
MICROSCOPIC COMMENT: NORMAL
MONOCYTES # BLD AUTO: 0.6 K/UL (ref 0.3–1)
MONOCYTES NFR BLD: 9 % (ref 4–15)
NEUTROPHILS # BLD AUTO: 4.3 K/UL (ref 1.8–7.7)
NEUTROPHILS NFR BLD: 63.6 % (ref 38–73)
NITRITE UR QL STRIP: NEGATIVE
NRBC BLD-RTO: 0 /100 WBC
PH UR STRIP: 7 [PH] (ref 5–8)
PLATELET # BLD AUTO: 267 K/UL (ref 150–350)
PMV BLD AUTO: 9.9 FL (ref 9.2–12.9)
POTASSIUM SERPL-SCNC: 3 MMOL/L (ref 3.5–5.1)
PROT SERPL-MCNC: 4.8 G/DL (ref 6–8.4)
PROT UR QL STRIP: ABNORMAL
RBC # BLD AUTO: 3.33 M/UL (ref 4.6–6.2)
RBC #/AREA URNS HPF: 3 /HPF (ref 0–4)
SODIUM SERPL-SCNC: 141 MMOL/L (ref 136–145)
SP GR UR STRIP: 1.01 (ref 1–1.03)
URN SPEC COLLECT METH UR: ABNORMAL
UROBILINOGEN UR STRIP-ACNC: NEGATIVE EU/DL
WBC # BLD AUTO: 6.75 K/UL (ref 3.9–12.7)
WBC #/AREA URNS HPF: 2 /HPF (ref 0–5)

## 2020-02-23 PROCEDURE — 81000 URINALYSIS NONAUTO W/SCOPE: CPT

## 2020-02-23 PROCEDURE — 85025 COMPLETE CBC W/AUTO DIFF WBC: CPT

## 2020-02-23 PROCEDURE — 93005 ELECTROCARDIOGRAM TRACING: CPT

## 2020-02-23 PROCEDURE — 83735 ASSAY OF MAGNESIUM: CPT

## 2020-02-23 PROCEDURE — 80053 COMPREHEN METABOLIC PANEL: CPT

## 2020-02-23 PROCEDURE — 25000003 PHARM REV CODE 250: Performed by: EMERGENCY MEDICINE

## 2020-02-23 PROCEDURE — 99284 EMERGENCY DEPT VISIT MOD MDM: CPT | Mod: 25

## 2020-02-23 PROCEDURE — 96374 THER/PROPH/DIAG INJ IV PUSH: CPT

## 2020-02-23 PROCEDURE — 63600175 PHARM REV CODE 636 W HCPCS: Performed by: EMERGENCY MEDICINE

## 2020-02-23 PROCEDURE — 96361 HYDRATE IV INFUSION ADD-ON: CPT

## 2020-02-23 RX ORDER — VALSARTAN 80 MG/1
160 TABLET ORAL
Status: DISCONTINUED | OUTPATIENT
Start: 2020-02-23 | End: 2020-02-23

## 2020-02-23 RX ORDER — METOPROLOL SUCCINATE 100 MG/1
100 TABLET, EXTENDED RELEASE ORAL 2 TIMES DAILY
COMMUNITY
Start: 2020-01-08 | End: 2020-03-02 | Stop reason: SDUPTHER

## 2020-02-23 RX ORDER — POTASSIUM CHLORIDE 20 MEQ/1
40 TABLET, EXTENDED RELEASE ORAL
Status: COMPLETED | OUTPATIENT
Start: 2020-02-23 | End: 2020-02-23

## 2020-02-23 RX ORDER — LOSARTAN POTASSIUM 50 MG/1
50 TABLET ORAL
Status: COMPLETED | OUTPATIENT
Start: 2020-02-23 | End: 2020-02-23

## 2020-02-23 RX ORDER — SODIUM CHLORIDE 9 MG/ML
1000 INJECTION, SOLUTION INTRAVENOUS
Status: COMPLETED | OUTPATIENT
Start: 2020-02-23 | End: 2020-02-23

## 2020-02-23 RX ORDER — HYDRALAZINE HYDROCHLORIDE 20 MG/ML
10 INJECTION INTRAMUSCULAR; INTRAVENOUS
Status: COMPLETED | OUTPATIENT
Start: 2020-02-23 | End: 2020-02-23

## 2020-02-23 RX ORDER — LOSARTAN POTASSIUM 100 MG/1
100 TABLET ORAL DAILY
COMMUNITY
End: 2020-02-23 | Stop reason: ALTCHOICE

## 2020-02-23 RX ORDER — ACETAMINOPHEN 325 MG/1
650 TABLET ORAL
Status: COMPLETED | OUTPATIENT
Start: 2020-02-23 | End: 2020-02-23

## 2020-02-23 RX ORDER — VALSARTAN 320 MG/1
320 TABLET ORAL DAILY
Qty: 90 TABLET | Refills: 1 | Status: SHIPPED | OUTPATIENT
Start: 2020-02-23 | End: 2020-02-24 | Stop reason: SDUPTHER

## 2020-02-23 RX ADMIN — SODIUM CHLORIDE 1000 ML: 0.9 INJECTION, SOLUTION INTRAVENOUS at 07:02

## 2020-02-23 RX ADMIN — HYDRALAZINE HYDROCHLORIDE 10 MG: 20 INJECTION INTRAMUSCULAR; INTRAVENOUS at 06:02

## 2020-02-23 RX ADMIN — LOSARTAN POTASSIUM 50 MG: 50 TABLET, FILM COATED ORAL at 06:02

## 2020-02-23 RX ADMIN — POTASSIUM CHLORIDE 40 MEQ: 1500 TABLET, EXTENDED RELEASE ORAL at 06:02

## 2020-02-23 RX ADMIN — ACETAMINOPHEN 650 MG: 325 TABLET ORAL at 10:02

## 2020-02-23 NOTE — TELEPHONE ENCOUNTER
Reason for Disposition   [1] Systolic BP  >= 160 OR Diastolic >= 100 AND [2] cardiac or neurologic symptoms (e.g., chest pain, difficulty breathing, unsteady gait, blurred vision)    Additional Information   Negative: Difficult to awaken or acting confused (e.g., disoriented, slurred speech)   Negative: Severe difficulty breathing (e.g., struggling for each breath, speaks in single words)   Negative: [1] Weakness of the face, arm or leg on one side of the body AND [2] new onset   Negative: [1] Numbness (i.e., loss of sensation) of the face, arm or leg on one side of the body AND [2] new onset   Negative: [1] Chest pain lasts > 5 minutes AND [2] history of heart disease  (i.e., heart attack, bypass surgery, angina, angioplasty, CHF)   Negative: [1] Chest pain AND [2] took nitrogylcerin AND [3] pain was not relieved   Negative: Sounds like a life-threatening emergency to the triager   Negative: Symptom is main concern  (e.g., headache, chest pain)   Negative: Low blood pressure is main concern    Protocols used: HIGH BLOOD PRESSURE-A-AH  Admit 1/30  Pt called re HBP. Hx heart pblms. BP at 1216pm 202/100. At 226 193/92. C/o HA, eyes hurting. Denies CP, denies SOB. Rec ED. Pt agrees. Call back with questions

## 2020-02-23 NOTE — ED PROVIDER NOTES
Encounter Date: 2/23/2020    SCRIBE #1 NOTE: I, Yu Gaviota, am scribing for, and in the presence of,  Tisha Finnegan MD. I have scribed the entire note.       History     Chief Complaint   Patient presents with    Hypertension     59 year old male presents to ed cc of hypertension patient states headache denies cp/sob     Time seen b provider: 5:42 PM    This is a 60 y/o male with PMHx of hemothorax, HTN, arthritis, CAD, PAD, HLD, MI, and long term anti-coagulant use, who presents with complaint of frontal HA with elevated BP.  HA is dull, achy and gradual in onset.  He reports that his BP has been going up for a long time, but today it was worse. He monitors his BP daily, and notes that this is the first time that he has had a HA with elevated BP. The patient denies any CP, fever, or any other concerning symptoms. He has not taken anything for his HA prior to coming to ED. The patient has been compliant with his metoprolol but has been unable to start the Valsartan as this has been on backorder at his pharmacy. His PCP is Dr. Encarnacion, and his cardiologist is Dr. Jenkins. He takes aspirin daily.     The history is provided by the patient.     Review of patient's allergies indicates:   Allergen Reactions    Ace inhibitors Rash     Past Medical History:   Diagnosis Date    Allergy     Anticoagulant long-term use     Arthritis     Coronary artery disease     Heart attack 10/04/2019    Hemothorax     Hyperlipidemia     Hypertension     PAD (peripheral artery disease)      Past Surgical History:   Procedure Laterality Date    ABDOMINAL AORTOGRAPHY N/A 5/10/2019    Procedure: AORTOGRAM-ABDOMINAL;  Surgeon: Keo Jenkins MD;  Location: [facility] CATH LAB/EP;  Service: Cardiology;  Laterality: N/A;  RSFA intervention     CARDIAC CATHETERIZATION      CORONARY ANGIOGRAPHY N/A 3/29/2019    Procedure: ANGIOGRAM, CORONARY ARTERY;  Surgeon: Keo Jenkins MD;  Location: [facility] CATH LAB/EP;  Service: Cardiology;   Laterality: N/A;    CORONARY ANGIOGRAPHY Left 10/11/2019    Procedure: ANGIOGRAM, CORONARY ARTERY;  Surgeon: Keo Jenkins MD;  Location: [facility] CATH LAB/EP;  Service: Cardiology;  Laterality: Left;    CORONARY ANGIOPLASTY WITH STENT PLACEMENT  2019    mid and distal RCA    EYE SURGERY      LEFT HEART CATHETERIZATION N/A 3/29/2019    Procedure: Left heart cath;  Surgeon: Keo Jenkins MD;  Location: [facility] CATH LAB/EP;  Service: Cardiology;  Laterality: N/A;    LEFT HEART CATHETERIZATION Left 10/8/2019    Procedure: Left heart cath;  Surgeon: Keo Jenkins MD;  Location: [facility] CATH LAB/EP;  Service: Cardiology;  Laterality: Left;    PERCUTANEOUS TRANSLUMINAL ANGIOPLASTY (PTA) OF PERIPHERAL VESSEL N/A 2019    Procedure: PTA, PERIPHERAL VESSEL;  Surgeon: Keo Jenkins MD;  Location: [facility] CATH LAB/EP;  Service: Cardiology;  Laterality: N/A;     Family History   Problem Relation Age of Onset    Aneurysm Mother     Hypertension Mother     Heart disease Mother     Cancer Father     Diabetes Sister     Hypertension Sister     No Known Problems Brother     No Known Problems Son      Social History     Tobacco Use    Smoking status: Former Smoker     Types: Cigarettes     Last attempt to quit: 1999     Years since quittin.8    Smokeless tobacco: Never Used   Substance Use Topics    Alcohol use: No     Alcohol/week: 0.0 standard drinks     Frequency: Never     Drinks per session: Patient refused     Binge frequency: Patient refused    Drug use: No     Review of Systems   Constitutional: Negative for chills and fever.   HENT: Negative for sore throat.    Eyes: Negative for visual disturbance.   Respiratory: Negative for shortness of breath.    Cardiovascular: Negative for chest pain.        Elevated BP   Gastrointestinal: Negative for abdominal pain, nausea and vomiting.   Genitourinary: Negative for dysuria and hematuria.   Musculoskeletal: Negative for back pain, neck pain and neck  stiffness.   Skin: Negative for rash and wound.   Neurological: Positive for headaches (frontal). Negative for syncope and weakness.   Psychiatric/Behavioral: Negative for confusion.       Physical Exam     Initial Vitals [02/23/20 1642]   BP Pulse Resp Temp SpO2   (!) 207/106 74 20 97.6 °F (36.4 °C) 98 %      MAP       --         Physical Exam    Nursing note and vitals reviewed.  Constitutional: He appears well-developed and well-nourished. He is not diaphoretic. No distress.   HENT:   Head: Normocephalic and atraumatic.   Right Ear: External ear normal.   Left Ear: External ear normal.   Nose: Nose normal.   Eyes: EOM are normal.   Neck: Normal range of motion. Neck supple.   No meningismus     Cardiovascular: Normal rate, regular rhythm and normal heart sounds. Exam reveals no gallop and no friction rub.    No murmur heard.  Pulmonary/Chest: Breath sounds normal. No respiratory distress. He has no wheezes. He has no rhonchi. He has no rales.   Abdominal: Soft. There is no tenderness. There is no rebound and no guarding.   Musculoskeletal: Normal range of motion. He exhibits no edema (1+ pitting edema at bilateral LE) or tenderness.   Neurological: He is alert and oriented to person, place, and time. No cranial nerve deficit. GCS score is 15. GCS eye subscore is 4. GCS verbal subscore is 5. GCS motor subscore is 6.   Skin: Skin is warm and dry. Capillary refill takes less than 2 seconds. No rash noted.   Psychiatric: He has a normal mood and affect.         ED Course   Procedures  Labs Reviewed   CBC W/ AUTO DIFFERENTIAL - Abnormal; Notable for the following components:       Result Value    RBC 3.33 (*)     Hemoglobin 8.8 (*)     Hematocrit 26.8 (*)     Mean Corpuscular Volume 81 (*)     Mean Corpuscular Hemoglobin 26.4 (*)     All other components within normal limits   COMPREHENSIVE METABOLIC PANEL - Abnormal; Notable for the following components:    Potassium 3.0 (*)     Calcium 7.6 (*)     Total Protein 4.8  (*)     Albumin 1.8 (*)     Anion Gap 6 (*)     eGFR if non  55 (*)     All other components within normal limits   URINALYSIS, REFLEX TO URINE CULTURE - Abnormal; Notable for the following components:    Protein, UA 3+ (*)     Occult Blood UA 1+ (*)     All other components within normal limits    Narrative:     Preferred Collection Type->Urine, Clean Catch   MAGNESIUM   MAGNESIUM   URINALYSIS MICROSCOPIC    Narrative:     Preferred Collection Type->Urine, Clean Catch            X-Rays:   Independently Interpreted Readings:   Other Readings:  Reviewed by myself, read by radiology.     Imaging Results          CT Head Without Contrast (Final result)  Result time 02/23/20 19:08:07    Final result by Marlen Braun MD (02/23/20 19:08:07)                 Impression:      No acute intracranial abnormality detected.    Nonacute fracture of the right lamina papyracea.      Electronically signed by: Marlen Braun  Date:    02/23/2020  Time:    19:08             Narrative:    EXAMINATION:  CT OF THE HEAD WITHOUT    CLINICAL HISTORY:  Headache, acute, norm neuro exam;    TECHNIQUE:  5 mm unenhanced axial images were obtained from the skull base to the vertex.    COMPARISON:  None.    FINDINGS:  The ventricles, basal cisterns, and cortical sulci are within normal limits for patient's stated age. There is no acute intracranial hemorrhage, territorial infarct or mass effect, or midline shift. The visualized paranasal sinuses and mastoid air cells are clear. There is a nonacute fracture of the right lamina papyracea.                              Medical Decision Making:   History:   Old Medical Records: I decided to obtain old medical records.  Initial Assessment:   This is a 60 y/o male with PMHx of hemothorax, HTN, arthritis, CAD, PAD, HLD, MI, and long term anti-coagulant use, who presents with complaint of frontal HA with elevated BP.   Differential Diagnosis:   Migraine headache, cluster headache,  tension headache eye strain, and infectious causes such as meningitis, pharyngitis and sinusitis, other dangerous causes such as subarachnoid hemorrhage, tumor    Independently Interpreted Test(s):   I have ordered and independently interpreted EKG Reading(s) - see prior notes  Clinical Tests:   Lab Tests: Reviewed and Ordered  Radiological Study: Reviewed and Ordered  Medical Tests: Reviewed and Ordered  ED Management:    On re-evaluation, the patient's status has improved.  Bp improved, HA resolved.   After complete ED evaluation, clinical impression is most consistent with hypertension, HA.  - CT head negative for acute pathology  - Cr wnl  -  Hypokalemic, replaced in ED    PCP and cardiology follow-up within 2-3 days was recommended.  represcribed his valsartan so that he can take it to another pharmacy    After taking into careful account the patient's history, physical exam findings, as well as empirical and objective data obtained throughout ED workup, I feel no emergent medical condition has been identified. No further evaluation or admission was felt to be required, and the patient is stable for discharge from the ED. The patient and any additional family present were updated with test results, overall clinical impression, and recommended further plan of care, including discharge instructions as provided and outpatient follow-up for continued evaluation and management as needed. All questions were answered. The patient expressed understanding and agreed with current plan for discharge and follow-up plan of care. Strict ED return precautions were provided, including return/worsening of current symptoms, new symptoms, or any other concerns.                     ED Course as of Feb 23 2327   Sun Feb 23, 2020   1720 BP(!): 207/106 [LD]   1720 Temp: 97.6 °F (36.4 °C) [LD]   1720 Pulse: 74 [LD]   1720 Resp: 20 [LD]   1720 SpO2: 98 % [LD]   1841 EKG with sinus bradycardia, rate of 58 bpm.  No STEMI    [LD]   2257  Patient feeling much better      [LD]      ED Course User Index  [LD] Tisha Finnegan MD                Clinical Impression:       ICD-10-CM ICD-9-CM   1. Acute nonintractable headache, unspecified headache type R51 784.0   2. Hypertension I10 401.9   3. Essential hypertension I10 401.9         Disposition:   Disposition: Discharged  Condition: Stable          I, Tisha Finnegan,  personally performed the services described in this documentation. All medical record entries made by the scribe were at my direction and in my presence.  I have reviewed the chart and agree that the record reflects my personal performance and is accurate and complete. Tisha Finnegan M.D. 11:26 PM02/23/2020                   Tisha Finnegan MD  02/23/20 2322

## 2020-02-23 NOTE — ED TRIAGE NOTES
Pt states long history of complications with Blood pressure. Pt denies any nausea, chest pain, or SOB. Pt states he has a head ache that has been bothering him since 8am this morning. Bed locked and at lowest position. Call bell within reach. Will continue to monitor

## 2020-02-24 ENCOUNTER — PATIENT OUTREACH (OUTPATIENT)
Dept: OTHER | Facility: OTHER | Age: 60
End: 2020-02-24

## 2020-02-24 DIAGNOSIS — I10 ESSENTIAL HYPERTENSION: ICD-10-CM

## 2020-02-26 ENCOUNTER — PATIENT MESSAGE (OUTPATIENT)
Dept: ADMINISTRATIVE | Facility: OTHER | Age: 60
End: 2020-02-26

## 2020-02-26 ENCOUNTER — PATIENT MESSAGE (OUTPATIENT)
Dept: CARDIOLOGY | Facility: CLINIC | Age: 60
End: 2020-02-26

## 2020-02-26 ENCOUNTER — PATIENT OUTREACH (OUTPATIENT)
Dept: OTHER | Facility: OTHER | Age: 60
End: 2020-02-26

## 2020-02-26 ENCOUNTER — TELEPHONE (OUTPATIENT)
Dept: CARDIOLOGY | Facility: CLINIC | Age: 60
End: 2020-02-26

## 2020-02-26 RX ORDER — VALSARTAN 320 MG/1
320 TABLET ORAL DAILY
Qty: 90 TABLET | Refills: 3 | Status: SHIPPED | OUTPATIENT
Start: 2020-02-26 | End: 2020-04-17 | Stop reason: DRUGHIGH

## 2020-02-26 NOTE — PROGRESS NOTES
Digital Medicine: Clinician Follow-Up    Patient returned call for digital medicine follow-up. Blood pressure has been significantly elevated and care team received an alert. Elevated blood pressure accompanied by headache and patient seen in the ED. Treated with hydralazine and valsartan and given a new prescription for valsartan as it was unavailable at [facility]. Patient confirmed that he picked up new prescription today. Symptoms have subsided.     The history is provided by the patient. No  was used.     Follow Up  Follow-up reason(s): reading review      Alert received.   Care Team received high BP alert.  Patient is experiencing symptomsof hypertension.            Assessment:  Patient's current 30-day average is not at goal of <130/80 mmHg.       Plan:  Patient will start valsartan 320 mg as prescribed.   Consider adding nifedipine at next follow-up.   Patients health , Crsytal Nair, will follow-up as scheduled.    I will continue to monitor regularly and will follow-up in 1 weeks, sooner if blood pressure begins to trend upward or downward.     Patient has my contact information and knows to call with any concerns or clinical changes.            There are no preventive care reminders to display for this patient.    Last 5 Patient Entered Readings                                      Current 30 Day Average: 177/89     Recent Readings 2/26/2020 2/25/2020 2/25/2020 2/23/2020 2/23/2020    SBP (mmHg) 208 207 187 181 193    DBP (mmHg) 101 104 88 100 92    Pulse 82 69 69 78 66             Hypertension Medications             metoprolol succinate (TOPROL-XL) 100 MG 24 hr tablet Take 1 tablet (100 mg total) by mouth 2 (two) times daily.    metoprolol succinate (TOPROL-XL) 100 MG 24 hr tablet Take 100 mg by mouth 2 (two) times daily.    valsartan (DIOVAN) 320 MG tablet Take 1 tablet (320 mg total) by mouth once daily.                 Screenings

## 2020-02-26 NOTE — TELEPHONE ENCOUNTER
----- Message from Nina Hayden sent at 2/26/2020  3:14 PM CST -----  Contact: Self [phone]  Patient would like to speak with you about his work release note. Please advise

## 2020-02-26 NOTE — PROGRESS NOTES
Called patient back as requested. He is wondering if his cuff needs to calibrated as his blood pressure was elevated today.  Patient denies s/s of hypertension (SOB, CP, severe headaches, changes in vision) associated with high readings. Instructed patient to go to the ED if BP >180/110 and accompanied by hypertensive s/s, patient confirms understanding. Patient did charge cuff this morning and plans to recheck his blood pressure when he gets home later today. Explained that cuff cannot be calibrated however, if he has concerns with accuracy if device that I recommend having it checked at cardiac rehab on Monday. Discussed adding nifedipine if BP remains elevated at next follow-up in 1 week.

## 2020-02-26 NOTE — TELEPHONE ENCOUNTER
Returned pt phone call     Pt was advised that his return to work letter was faxed to both the HR department and his job     Pt stated that something was incorrect in his letter, did not emphasize on what was wrong with the letter and hung up

## 2020-03-02 ENCOUNTER — PATIENT MESSAGE (OUTPATIENT)
Dept: CARDIOLOGY | Facility: CLINIC | Age: 60
End: 2020-03-02

## 2020-03-02 ENCOUNTER — OFFICE VISIT (OUTPATIENT)
Dept: INTERNAL MEDICINE | Facility: CLINIC | Age: 60
End: 2020-03-02
Payer: COMMERCIAL

## 2020-03-02 ENCOUNTER — PATIENT MESSAGE (OUTPATIENT)
Dept: INTERNAL MEDICINE | Facility: CLINIC | Age: 60
End: 2020-03-02

## 2020-03-02 ENCOUNTER — HOSPITAL ENCOUNTER (OUTPATIENT)
Dept: RADIOLOGY | Facility: HOSPITAL | Age: 60
Discharge: HOME | End: 2020-03-02
Attending: INTERNAL MEDICINE
Payer: COMMERCIAL

## 2020-03-02 VITALS
HEIGHT: 71 IN | DIASTOLIC BLOOD PRESSURE: 94 MMHG | TEMPERATURE: 98 F | WEIGHT: 193.56 LBS | BODY MASS INDEX: 27.1 KG/M2 | OXYGEN SATURATION: 97 % | HEART RATE: 77 BPM | SYSTOLIC BLOOD PRESSURE: 178 MMHG

## 2020-03-02 DIAGNOSIS — E87.6 HYPOKALEMIA: ICD-10-CM

## 2020-03-02 DIAGNOSIS — R05.9 COUGH: ICD-10-CM

## 2020-03-02 DIAGNOSIS — I16.0 HYPERTENSIVE URGENCY: ICD-10-CM

## 2020-03-02 DIAGNOSIS — J18.9 COMMUNITY ACQUIRED PNEUMONIA OF LEFT LUNG, UNSPECIFIED PART OF LUNG: Primary | ICD-10-CM

## 2020-03-02 DIAGNOSIS — N18.30 CHRONIC KIDNEY DISEASE, STAGE III (MODERATE): ICD-10-CM

## 2020-03-02 PROCEDURE — 71046 X-RAY EXAM CHEST 2 VIEWS: CPT | Mod: 26,,, | Performed by: RADIOLOGY

## 2020-03-02 PROCEDURE — 99999 PR PBB SHADOW E&M-EST. PATIENT-LVL IV: CPT | Mod: PBBFAC,,, | Performed by: INTERNAL MEDICINE

## 2020-03-02 PROCEDURE — 3077F SYST BP >= 140 MM HG: CPT | Mod: CPTII,S$GLB,, | Performed by: INTERNAL MEDICINE

## 2020-03-02 PROCEDURE — 99999 PR PBB SHADOW E&M-EST. PATIENT-LVL IV: ICD-10-PCS | Mod: PBBFAC,,, | Performed by: INTERNAL MEDICINE

## 2020-03-02 PROCEDURE — 3008F BODY MASS INDEX DOCD: CPT | Mod: CPTII,S$GLB,, | Performed by: INTERNAL MEDICINE

## 2020-03-02 PROCEDURE — 3008F PR BODY MASS INDEX (BMI) DOCUMENTED: ICD-10-PCS | Mod: CPTII,S$GLB,, | Performed by: INTERNAL MEDICINE

## 2020-03-02 PROCEDURE — 71046 X-RAY EXAM CHEST 2 VIEWS: CPT | Mod: TC,FY,PO

## 2020-03-02 PROCEDURE — 3080F DIAST BP >= 90 MM HG: CPT | Mod: CPTII,S$GLB,, | Performed by: INTERNAL MEDICINE

## 2020-03-02 PROCEDURE — 3077F PR MOST RECENT SYSTOLIC BLOOD PRESSURE >= 140 MM HG: ICD-10-PCS | Mod: CPTII,S$GLB,, | Performed by: INTERNAL MEDICINE

## 2020-03-02 PROCEDURE — 71046 XR CHEST PA AND LATERAL: ICD-10-PCS | Mod: 26,,, | Performed by: RADIOLOGY

## 2020-03-02 PROCEDURE — 99214 OFFICE O/P EST MOD 30 MIN: CPT | Mod: S$GLB,,, | Performed by: INTERNAL MEDICINE

## 2020-03-02 PROCEDURE — 3080F PR MOST RECENT DIASTOLIC BLOOD PRESSURE >= 90 MM HG: ICD-10-PCS | Mod: CPTII,S$GLB,, | Performed by: INTERNAL MEDICINE

## 2020-03-02 PROCEDURE — 99214 PR OFFICE/OUTPT VISIT, EST, LEVL IV, 30-39 MIN: ICD-10-PCS | Mod: S$GLB,,, | Performed by: INTERNAL MEDICINE

## 2020-03-02 RX ORDER — LEVOFLOXACIN 750 MG/1
750 TABLET ORAL DAILY
Qty: 7 TABLET | Refills: 0 | Status: SHIPPED | OUTPATIENT
Start: 2020-03-02 | End: 2020-03-09

## 2020-03-02 RX ORDER — HYDRALAZINE HYDROCHLORIDE 25 MG/1
25 TABLET, FILM COATED ORAL 3 TIMES DAILY
Qty: 90 TABLET | Refills: 11 | Status: SHIPPED | OUTPATIENT
Start: 2020-03-02 | End: 2020-03-04 | Stop reason: ALTCHOICE

## 2020-03-02 NOTE — PROGRESS NOTES
Patient ID: Domingo Gross is a 59 y.o. male.    Chief Complaint: Hypertension    HPI Domingo is a 59 y.o. male with CAD, HTN, HLD, PAD, s/p recent cardiac arrest who presents today with a chief complaint of hypertension.  Onset of elevated blood pressure unknown.  The patient did go to the emergency department on February 23rd when he was experiencing headache.  His blood pressure in the emergency department was 207/106.  He was treated with losartan, hydralazine, and valsartan.  However he reports that his blood pressure was still elevated when he was discharged from the emergency department.  He has been compliant with taking metoprolol succinate 100 mg p.o. b.i.d. and valsartan 320 mg p.o. daily.  He denies any changes to his diet, such as increased salt intake.  Patient is again experiencing headaches today.    Onset of worsening hypertension unknown.  It is constant in duration.  Nothing is making it better.  It is associated with symptom of headache.    Patient also reports cough today.  Onset was yesterday.  It is not associated with any sinus symptoms such as sinus pressure/pain, runny nose, or postnasal drip.  It is constant in duration.  Nothing is making it better.    Review of Systems   Constitutional: Negative for activity change and unexpected weight change.   HENT: Negative for hearing loss, rhinorrhea and trouble swallowing.    Eyes: Negative for visual disturbance.   Respiratory: Negative for chest tightness and wheezing.    Cardiovascular: Negative for chest pain and palpitations.   Gastrointestinal: Negative for blood in stool, constipation, diarrhea and vomiting.   Endocrine: Negative for polydipsia and polyuria.   Genitourinary: Negative for difficulty urinating, hematuria and urgency.   Musculoskeletal: Negative for arthralgias, joint swelling and neck pain.   Neurological: Positive for headaches. Negative for weakness.   Psychiatric/Behavioral: Negative for confusion and dysphoric mood.        Objective:     Vitals:    03/02/20 0953   BP: (!) 178/94   Pulse: 77   Temp: 97.7 °F (36.5 °C)        Physical Exam   Constitutional: He is oriented to person, place, and time. He appears well-developed and well-nourished. No distress.   HENT:   Head: Normocephalic and atraumatic.   Right Ear: External ear normal.   Left Ear: External ear normal.   Nose: Nose normal.   Eyes: Conjunctivae and EOM are normal.   Cardiovascular: Normal rate, regular rhythm, normal heart sounds and intact distal pulses. Exam reveals no gallop and no friction rub.   No murmur heard.  Pulmonary/Chest: Effort normal and breath sounds normal. No stridor. No respiratory distress. He has no wheezes. He has no rales.   Neurological: He is alert and oriented to person, place, and time.   Skin: Skin is warm and dry. He is not diaphoretic.   Psychiatric: He has a normal mood and affect. His behavior is normal. Judgment and thought content normal.   Vitals reviewed.      Assessment:       1. Hypertensive urgency Active   2. Hypokalemia Active   3. Chronic kidney disease, stage III (moderate) Chronic   4. Cough Active       Plan:         Hypertensive urgency  Comments:  Recommend emergency department but pt refuses. Add hydralazine. Low salt diet. F/u with Cards [facility]SA  Orders:  -     hydrALAZINE (APRESOLINE) 25 MG tablet; Take 1 tablet (25 mg total) by mouth 3 (three) times daily.  Dispense: 90 tablet; Refill: 11    Hypokalemia  -     Comprehensive metabolic panel; Future; Expected date: 03/02/2020    Chronic kidney disease, stage III (moderate)  Comments:  Avoid diuretics  Orders:  -     Comprehensive metabolic panel; Future; Expected date: 03/02/2020    Cough  -     X-Ray Chest PA And Lateral; Future; Expected date: 03/02/2020      Discussed with patient that I recommend going to the emergency department for treatment of his symptomatic (headaches) hypertensive urgency/emergency. Patient understands that his extremely elevated BP can lead to  heart attack or stroke, either of which can lead to death. Patient refuses to go to ED today. Therefore I will add hydralazine to BP regimen and try to get him in with cardiologist ASAP. Will check K level to follow up on hypokalemia and will get chest xray to evaluate cough.     RTC one week      Warning signs discussed, patient to call with any further issues or worsening of symptoms.       Parts of the above note were dictated using a voice dictation software. Please excuse any grammatical or typographical errors.

## 2020-03-02 NOTE — LETTER
March 2, 2020      Cedric Rhoades MD  [address]  [facility] [address]           [facility]  [address]  [place] [address]  Phone: [phone]  Fax: [phone]          Patient: Domingo Gross   MR Number: [id number]   YOB: 1960   Date of Visit: 3/2/2020       Dear Dr. Cedric Rhoades:    Thank you for referring Domingo Gross to me for evaluation. Attached you will find relevant portions of my assessment and plan of care.    If you have questions, please do not hesitate to call me. I look forward to following Domingo Gross along with you.    Sincerely,    Analy Encarnacion MD    Enclosure  CC:  No Recipients    If you would like to receive this communication electronically, please contact [email or web address] or [phone] to request more information on [employer] access.    For providers and/or their staff who would like to refer a patient to [facility], please contact us through our one-stop-shop provider referral line, [facility], at [phone].    If you feel you have received this communication in error or would no longer like to receive these types of communications, please e-mail [email or web address]

## 2020-03-03 NOTE — ASSESSMENT & PLAN NOTE
03/2019  · S/p LHC via right radial  · LM, LAD, and LCX are patent with luminal irregularities  · RCA mid 95% calcified lesion  · Normal EF with LVEDP 8 mmHg  · PCI of mid LAD with 2.5 x 30 and 2.5 x 15 mm Resolute REZA  · PCI of proximal RCA to treat guide dissection with 3.5 x 22 Resolute REZA    Presented as witnessed cardiac arrest at an event. He spent the a good part of the day outdoor working on a fence with Temp  F. Per EMS report no pulse on arrival then CPR then regained ROSC after one shock. ECG from EMS and on arrival at [facility] revealed afib with RVR + inferior lateral leads ST depression. He denied any palpitations or chest pain.       Initially decided to take him to the cath lab for angiography but reviewing his labs we cancelled the case. Cr 2.6 on admission and now down to 1.2 with IVFs.   Proceeded with LHC on 10/08/2019 new Lcx lesion discovered. Staged PCI planned later this week to give kidneys time to recover   Continue asa, plavix, statin, BB.  No ACEI/ARB given renal fx     PATIENT IS CALLING ABOUT THE PRIOR AUTH WITH HER MEDICATION, SHE STILL HASN'T HEARD ANY NEWS ON HER MEDICATION AND PATIENT IS CURRENTLY OUT OF SEIZURE MEDICATION       PLEASE ADVISE

## 2020-03-04 ENCOUNTER — OFFICE VISIT (OUTPATIENT)
Dept: CARDIOLOGY | Facility: CLINIC | Age: 60
End: 2020-03-04
Payer: COMMERCIAL

## 2020-03-04 ENCOUNTER — PATIENT MESSAGE (OUTPATIENT)
Dept: CARDIOLOGY | Facility: CLINIC | Age: 60
End: 2020-03-04

## 2020-03-04 ENCOUNTER — PATIENT OUTREACH (OUTPATIENT)
Dept: ADMINISTRATIVE | Facility: OTHER | Age: 60
End: 2020-03-04

## 2020-03-04 ENCOUNTER — PATIENT OUTREACH (OUTPATIENT)
Dept: OTHER | Facility: OTHER | Age: 60
End: 2020-03-04

## 2020-03-04 VITALS
BODY MASS INDEX: 27.96 KG/M2 | SYSTOLIC BLOOD PRESSURE: 160 MMHG | DIASTOLIC BLOOD PRESSURE: 82 MMHG | HEART RATE: 80 BPM | WEIGHT: 195.31 LBS | HEIGHT: 70 IN

## 2020-03-04 DIAGNOSIS — I25.111 CORONARY ARTERY DISEASE INVOLVING NATIVE CORONARY ARTERY OF NATIVE HEART WITH ANGINA PECTORIS WITH DOCUMENTED SPASM: ICD-10-CM

## 2020-03-04 DIAGNOSIS — I10 ESSENTIAL HYPERTENSION: ICD-10-CM

## 2020-03-04 DIAGNOSIS — I46.9 CARDIAC ARREST: ICD-10-CM

## 2020-03-04 DIAGNOSIS — I70.213 ATHEROSCLEROSIS OF NATIVE ARTERY OF BOTH LOWER EXTREMITIES WITH INTERMITTENT CLAUDICATION: ICD-10-CM

## 2020-03-04 DIAGNOSIS — E78.5 HYPERLIPIDEMIA LDL GOAL <70: ICD-10-CM

## 2020-03-04 DIAGNOSIS — I73.9 PAD (PERIPHERAL ARTERY DISEASE): ICD-10-CM

## 2020-03-04 DIAGNOSIS — E78.2 MIXED HYPERLIPIDEMIA: ICD-10-CM

## 2020-03-04 DIAGNOSIS — H26.9 CATARACT, UNSPECIFIED CATARACT TYPE, UNSPECIFIED LATERALITY: Primary | ICD-10-CM

## 2020-03-04 DIAGNOSIS — I73.9 CLAUDICATION IN PERIPHERAL VASCULAR DISEASE: ICD-10-CM

## 2020-03-04 DIAGNOSIS — I70.211 ATHEROSCLEROSIS OF NATIVE ARTERY OF RIGHT LOWER EXTREMITY WITH INTERMITTENT CLAUDICATION: ICD-10-CM

## 2020-03-04 PROCEDURE — 99999 PR PBB SHADOW E&M-EST. PATIENT-LVL III: ICD-10-PCS | Mod: PBBFAC,,, | Performed by: INTERNAL MEDICINE

## 2020-03-04 PROCEDURE — 99215 PR OFFICE/OUTPT VISIT, EST, LEVL V, 40-54 MIN: ICD-10-PCS | Mod: S$GLB,,, | Performed by: INTERNAL MEDICINE

## 2020-03-04 PROCEDURE — 3077F PR MOST RECENT SYSTOLIC BLOOD PRESSURE >= 140 MM HG: ICD-10-PCS | Mod: CPTII,S$GLB,, | Performed by: INTERNAL MEDICINE

## 2020-03-04 PROCEDURE — 93000 ELECTROCARDIOGRAM COMPLETE: CPT | Mod: S$GLB,,, | Performed by: INTERNAL MEDICINE

## 2020-03-04 PROCEDURE — 3008F BODY MASS INDEX DOCD: CPT | Mod: CPTII,S$GLB,, | Performed by: INTERNAL MEDICINE

## 2020-03-04 PROCEDURE — 99215 OFFICE O/P EST HI 40 MIN: CPT | Mod: S$GLB,,, | Performed by: INTERNAL MEDICINE

## 2020-03-04 PROCEDURE — 93000 EKG 12-LEAD: ICD-10-PCS | Mod: S$GLB,,, | Performed by: INTERNAL MEDICINE

## 2020-03-04 PROCEDURE — 3077F SYST BP >= 140 MM HG: CPT | Mod: CPTII,S$GLB,, | Performed by: INTERNAL MEDICINE

## 2020-03-04 PROCEDURE — 3079F PR MOST RECENT DIASTOLIC BLOOD PRESSURE 80-89 MM HG: ICD-10-PCS | Mod: CPTII,S$GLB,, | Performed by: INTERNAL MEDICINE

## 2020-03-04 PROCEDURE — 99999 PR PBB SHADOW E&M-EST. PATIENT-LVL III: CPT | Mod: PBBFAC,,, | Performed by: INTERNAL MEDICINE

## 2020-03-04 PROCEDURE — 3079F DIAST BP 80-89 MM HG: CPT | Mod: CPTII,S$GLB,, | Performed by: INTERNAL MEDICINE

## 2020-03-04 PROCEDURE — 3008F PR BODY MASS INDEX (BMI) DOCUMENTED: ICD-10-PCS | Mod: CPTII,S$GLB,, | Performed by: INTERNAL MEDICINE

## 2020-03-04 RX ORDER — AMLODIPINE BESYLATE 5 MG/1
5 TABLET ORAL 2 TIMES DAILY
Qty: 180 TABLET | Refills: 3 | Status: SHIPPED | OUTPATIENT
Start: 2020-03-04 | End: 2020-03-25 | Stop reason: ALTCHOICE

## 2020-03-04 NOTE — LETTER
March 4, 2020    Domingo Gross  [address] [place] [id number]         [facility] - Cardiology  [address] [place] [address]. [address]  [facility] [place] [address]  Phone: [phone]  Fax: [phone] March 4, 2020     Patient: Domingo Gross   YOB: 1960   Date of Visit: 3/4/2020       To Whom It May Concern:    It is my medical opinion that Domingo Gross is clinical well but is having difficulty walking long distances. He should park in the building to minimize excessive walking during working hours.     If you have any questions or concerns, please don't hesitate to call.    Sincerely,        Keo Jenkins MD

## 2020-03-04 NOTE — PATIENT INSTRUCTIONS

## 2020-03-04 NOTE — PROGRESS NOTES
Digital Medicine: Clinician Follow-Up    Called patient for digital medicine follow-up. He says that he is doing well today. Seen in office on 3/2 and BP was elevated. Patient declined follow-up in the ED however, has started hydralazine and is tolerating with no complaints. Discussed adding CCB but, patient plans to see cardiology this afternoon to discuss current regimen.     Patient denies s/s of hypotension (lightheadedness, dizziness, nausea, fatigue) associated with low readings. Instructed patient to inform me if this occurs, patient confirms understanding.    Patient denies s/s of hypertension (SOB, CP, severe headaches, changes in vision) associated with high readings. Instructed patient to go to the ED if BP >180/110 and accompanied by hypertensive s/s, patient confirms understanding.      The history is provided by the patient. No  was used.     Follow Up  Follow-up reason(s): reading review and routine education                   Assessment:  Patient's current 30-day average is not at goal of <130/80 mmHg.       Plan:  Continue current regimen for now.  Hydralazine is available for titration if needed however, would still consider adding a CCB.   Patients health , Crystal Nair, will follow-up as scheduled.    I will continue to monitor regularly and will follow-up in 2 weeks, sooner if blood pressure begins to trend upward or downward.        Patient has my contact information and knows to call with any concerns or clinical changes.            There are no preventive care reminders to display for this patient.    Last 5 Patient Entered Readings                                      Current 30 Day Average: 190/96     Recent Readings 3/4/2020 3/3/2020 3/3/2020 3/2/2020 3/2/2020    SBP (mmHg) 206 177 205 198 208    DBP (mmHg) 103 94 103 93 94    Pulse 75 79 70 68 72             Hypertension Medications             hydrALAZINE (APRESOLINE) 25 MG tablet Take 1 tablet (25 mg  total) by mouth 3 (three) times daily.    metoprolol succinate (TOPROL-XL) 100 MG 24 hr tablet Take 1 tablet (100 mg total) by mouth 2 (two) times daily.    valsartan (DIOVAN) 320 MG tablet Take 1 tablet (320 mg total) by mouth once daily.                 Screenings

## 2020-03-04 NOTE — PROGRESS NOTES
Subjective:   Patient ID:  Domingo Gross is a 59 y.o. male who presents for follow up of Hypertension; Coronary Artery Disease; Hyperlipidemia; and Peripheral Arterial Disease      HPI:     Domingo Gross 59 y.o. male is here follow up CAD, HTN, HLP, PAF in NSR, edema, and PAD without claudication, after bilateral SFA, GRUPO, EIA, and R CFA revascularization. His BP is not well controlled even with compliance with diet and medications. Recently pcp added hydralazine.         Angina resolved after multivessel PCI with REZA (3/2019)        ELISABETH with stress 5/2017:   R 1.01 to 0.44   L 0.92 to 0.45    After 6 minutes ambulation      CTA 5/2017:       Patent distal aorta and bilateral GRUPO/EIA stents   L EIA with de shawna 90% stenosis   L SFA 80% with 3 vessel run off     R EIA/CFA with moderate disease   R SFA 80% stenosis with 3 vessel run off        Peripheral angiogram with intervention 6/2017         Patent bilateral GRUPO/EIA stents.    De shawna 80% left EIA stenosis treated with 9.0 x 80 mm Lifestar  stent post dilated with 8.0 mm balloon.    Bilateral 70-80% SFA stenosis with 3 vessel run off.        3/2018      L SFA intervention for claudication   75% L SFA with 3 vessel run off   7 mmHg resting and 33 mmHg hyperemic mean gradient         L CFA antegrade access   PTA with 6.0 x 150 mm   PTA with 6.0 x 150 mm Lutonix   3 vessel run off           4/13/2018       S/p R SFA PTA for winsome IIb claudication   R CFA antegrade access         R CFA with 50% stenosis-heavily calcified lesion               Baseline /90         R SFA with 70% stenosis with a significant resting and hyperemic mean gradient     3 vessel run off             PTA of R SFA with 6.0 x 150 + 6.0 x 60 mm Lutonix DCB        5/2/2018   Patent arteries post revascularization        2/2019     R 0.84 to 0.27   L 0.90 to 0.66   After ambulation   Severe R calf claudication          Nuclear stress test 2/2019     + ECG with 2 mm ST depression   No wall  motion abnormalities   Test stopped at 6 minutes, 7 METs, 86% predicted heart rate   Stopped because of R leg claudication + ECG changes          S/p PCI RCA and LAD with REZA 3/2019       RCA 3.5 x 22 mm Resolute REZA   LAD 2.5 x 30 and 2.5 x 25 mm Resolute REZA      Peripheral aortogram 5/10/2019     R CFA 95% stenosis   3 vessel run off        Seen by Dr. Giron for R CFA endarterectomy but preferable should be off plavix for at least 5 days. He has a risk for stent thrombosis with premature interruption of DAPT. He later had R CFA atherectomy + PTA with DCB.       He was admitted 10/4/2019 for cardiac arrest. He was fully rescued. Initial rhythm was afib with rvr. He was not taken immediately to the cath lab because of ARF + hypokalemia. He had a diagnostic angiogram which revealed patent LM, LAD, RCA stent, and new ostial LCX lesion 99% with R to L collaterals. He had PCI with REZA after his renal function returned to baseline.       10/11/2019 for cardiac arrest        S/p staged LCX PCI                    L CFA access              IVUS guided PCI              3.0 x 15 mm Resolute REZA post dilated with 3.5 NC balloon         PET stress 2/2020     No ischemia              Patient Active Problem List    Diagnosis Date Noted    Cardiac arrest 10/04/2019     Priority: High    Atherosclerosis of native artery of both lower extremities with intermittent claudication 03/02/2018     Priority: High    Atherosclerosis of native artery of right lower extremity with intermittent claudication 04/13/2018     Priority: Low    Chest pain 01/30/2020    Atrial fibrillation with RVR 10/04/2019    Syncope 10/04/2019    Hypokalemia 10/04/2019    Acute renal failure 10/04/2019    Bilateral carotid artery stenosis     Coronary artery disease involving native coronary artery of native heart with angina pectoris with documented spasm 03/29/2019         3/29/2019    S/p LHC via R radial           LM, LAD, and LCX are patent  with luminal irregularities  RCA mid 95% calcified lesion  Normal EF with LVEDP 8 mmHg           PCI of mid LAD with 2.5 x 30 and 2.5 x 15 mm Resolute REZA  PCI of proximal RCA to treat guide dissection with 3.5 x 22 Resolute REZA        10/11/2019 for cardiac arrest        S/p staged LCX PCI                    L CFA access              IVUS guided PCI              3.0 x 15 mm Resolute REZA post dilated with 3.5 NC balloon           Abnormal cardiovascular stress test 02/27/2019         Nuclear stress test 2/2019     + ECG with 2 mm ST depression   No wall motion abnormalities   Test stopped at 6 minutes, 7 METs, 86% predicted heart rate   Stopped because of R leg claudication + ECG changes            Venous insufficiency of both lower extremities 06/04/2018    Localized edema 06/04/2018    Left knee pain 01/19/2016    Pain of left lower extremity 01/19/2016    Difficulty walking 01/19/2016    Acute pain of left knee 12/11/2015    Hyperlipidemia LDL goal <70 06/12/2015    DJD (degenerative joint disease), lumbar 05/27/2015    Lumbar facet arthropathy 05/27/2015    DDD (degenerative disc disease) 05/27/2015    Spondylosis without myelopathy 05/27/2015    Limb pain 11/21/2014    History of back injury 11/21/2014     20 years ago a bag fell on his back at work      Myalgia 11/21/2014    Claudication in peripheral vascular disease 06/13/2014    PAD (peripheral artery disease) 05/21/2014 6/13/2014      1. Three vessel runoff below the knee bilaterally.  2. Severe disease of the terminal aorta.  3. Successful PTAS.  4. Bilateral common iliac reconstruction with 8 x 39 mm stent extending in the aorta  5. Right common illiac was treated with an overlapping 9 x 60 mm SES   6. Left common iliac was treated with an overlapping 8 x 80 mm SES down to external iliac  7. All stents were post dilated with 9.0 x 60 mm balloons  8. Moderate bilateral SFA disease  9. Chronically occluded left internal iliac  artery        6/12/2015      1. 80% mid left SFA with a 20 mmHg resting mean gradient  2. Atherectomy with 2.2 Phonenix Volcano catheter  3. PTA with 6.0 x 100 mm Lutonix drug coated balloon        6/9/2017      Patent bilateral GRUPO/EIA stents  L EIA PTAS 9.0 x 80 mm Life stent post dilated with 8.0 mm balloon  Bilateral 70-80% SFA stenosis with 3 vessel run off          3/2018      L SFA intervention for claudication   75% L SFA with 3 vessel run off   7 mmHg resting and 33 mmHg hyperemic mean gradient         L CFA antegrade access   PTA with 6.0 x 150 mm   PTA with 6.0 x 150 mm Lutonix   3 vessel run off           4/13/2018       S/p R SFA PTA for winsome IIb claudication   R CFA antegrade access         R CFA with 50% stenosis-heavily calcified lesion               Baseline /90         R SFA with 70% stenosis with a significant resting and hyperemic mean gradient     3 vessel run off             PTA of R SFA with 6.0 x 150 + 6.0 x 60 mm Lutonix DCB        5/2/2018   Patent arteries post revascularization        2/2019     R 0.84 to 0.27   L 0.90 to 0.66   After ambulation   Severe R calf claudication        Peripheral angiogram 5/10/2019                   95% R CFA stenosis; heavily calcified    Patent SFA, POP + 3 vessel run off      Peripheral intervention 7/12/2019    S/p right CFA revascularization for winsome IIb claudication       Assisted JOSY approach   L radial access for visualization   5-6 slender R PT access for delivery of therapy          Atherectomy with SilverHawk catheter   PTA with 7.0 x 40 mm Lutonix DCB            Atherosclerosis of leg with intermittent claudication 04/21/2014     Winsome IIB      Elevated TSH 05/29/2013    HTN (hypertension) 04/29/2013    Elevated fasting blood sugar 04/29/2013           Right Arm BP - Sitting: 160/82  Left Arm BP - Sitting: 160/80          LAST HbA1c  Lab Results   Component Value Date    HGBA1C 5.7 (H) 10/28/2019       Lipid panel  Lab  Results   Component Value Date    CHOL 211 (H) 11/25/2019    CHOL 187 10/28/2019    CHOL 186 03/29/2019     Lab Results   Component Value Date    HDL 53 11/25/2019    HDL 50 10/28/2019    HDL 74 03/29/2019     Lab Results   Component Value Date    LDLCALC 123.8 11/25/2019    LDLCALC 101.4 10/28/2019    LDLCALC 92.2 03/29/2019     Lab Results   Component Value Date    TRIG 171 (H) 11/25/2019    TRIG 178 (H) 10/28/2019    TRIG 99 03/29/2019     Lab Results   Component Value Date    CHOLHDL 25.1 11/25/2019    CHOLHDL 26.7 10/28/2019    CHOLHDL 39.8 03/29/2019            Review of Systems   Constitution: Negative for diaphoresis, night sweats, weight gain and weight loss.   HENT: Negative for congestion.    Eyes: Negative for blurred vision, discharge and double vision.   Cardiovascular: Negative for chest pain, claudication, cyanosis, dyspnea on exertion, irregular heartbeat, leg swelling, near-syncope, orthopnea, palpitations, paroxysmal nocturnal dyspnea and syncope.   Respiratory: Negative for cough, shortness of breath and wheezing.    Endocrine: Negative for cold intolerance, heat intolerance and polyphagia.   Hematologic/Lymphatic: Negative for adenopathy and bleeding problem. Does not bruise/bleed easily.   Skin: Negative for dry skin and nail changes.   Musculoskeletal: Negative for arthritis, back pain, falls, joint pain, myalgias and neck pain.   Gastrointestinal: Negative for bloating, abdominal pain, change in bowel habit and constipation.   Genitourinary: Negative for bladder incontinence, dysuria, flank pain, genital sores and missed menses.   Neurological: Negative for aphonia, brief paralysis, difficulty with concentration, dizziness and weakness.   Psychiatric/Behavioral: Negative for altered mental status and memory loss. The patient does not have insomnia.    Allergic/Immunologic: Negative for environmental allergies.       Objective:   Physical Exam   Constitutional: He is oriented to person, place,  and time. He appears well-developed and well-nourished. He is not intubated.   HENT:   Head: Normocephalic and atraumatic.   Right Ear: External ear normal.   Left Ear: External ear normal.   Mouth/Throat: Oropharynx is clear and moist.   Eyes: Pupils are equal, round, and reactive to light. Conjunctivae and EOM are normal. Right eye exhibits no discharge. Left eye exhibits no discharge. No scleral icterus.   Neck: Normal range of motion. Neck supple. Normal carotid pulses, no hepatojugular reflux and no JVD present. Carotid bruit is not present. No tracheal deviation present. No thyromegaly present.   Cardiovascular: Normal rate, regular rhythm, S1 normal and S2 normal.  No extrasystoles are present. PMI is not displaced. Exam reveals no gallop, no S3, no distant heart sounds, no friction rub and no midsystolic click.   No murmur heard.  Pulses:       Carotid pulses are 2+ on the right side, and 2+ on the left side.       Radial pulses are 2+ on the right side, and 2+ on the left side.        Femoral pulses are 2+ on the right side, and 2+ on the left side.       Popliteal pulses are 2+ on the right side, and 2+ on the left side.        Dorsalis pedis pulses are 1+ on the right side, and 2+ on the left side.        Posterior tibial pulses are 1+ on the right side, and 2+ on the left side.         Triphasic bilateral DP and PT doppler signals           Pulmonary/Chest: Effort normal and breath sounds normal. No accessory muscle usage or stridor. No apnea, no tachypnea and no bradypnea. He is not intubated. No respiratory distress. He has no decreased breath sounds. He has no wheezes. He has no rales. He exhibits no tenderness and no bony tenderness.   Abdominal: He exhibits no distension, no pulsatile liver, no abdominal bruit, no ascites, no pulsatile midline mass and no mass. There is no tenderness. There is no rebound and no guarding.   Musculoskeletal: Normal range of motion. He exhibits no edema or tenderness.    Lymphadenopathy:     He has no cervical adenopathy.   Neurological: He is alert and oriented to person, place, and time. He has normal reflexes. No cranial nerve deficit. Coordination normal.   Skin: Skin is warm. No rash noted. No erythema. No pallor.   Psychiatric: He has a normal mood and affect. His behavior is normal. Judgment and thought content normal.       Assessment:     1. Cataract, unspecified cataract type, unspecified laterality    2. Mixed hyperlipidemia    3. Atherosclerosis of native artery of both lower extremities with intermittent claudication    4. Cardiac arrest    5. Atherosclerosis of native artery of right lower extremity with intermittent claudication    6. Essential hypertension    7. Claudication in peripheral vascular disease    8. PAD (peripheral artery disease)    9. Coronary artery disease involving native coronary artery of native heart with angina pectoris with documented spasm    10. Hyperlipidemia LDL goal <70        Plan:         Medical therapy for CAD + PAD  Cardiac rehab II  DAPT with aspirin + plavix  Intense statin therapy  Arb  Pletal       Target BP < 130/80 mmHg  Target LDL < 70        Stop hydralazine  Start norvasc 10 mg daily   If BP is not less than 130/80 mmHg he will stop toprol xl and start coreg 25 mg po bid  If still not < 130/80 mmHg he will cardura starting at 4 to a maximum of 8 mg daily if needed  We will also consider a diuretic while cautiously monitoring his renal function          Will switch from lipitor to crestor 40 mg qhs if LDL remains > 70      Continue with current medical plan and lifestyle changes.  Return sooner for concerns or questions. If symptoms persist go to the ED  I have reviewed all pertinent data on this patient       Referral for cataract disease        Follow up as scheduled. Return sooner for concerns or questions            He expressed verbal understanding and agreed with the plan        Greater than 50% of the visit of 45  minutes was spent counseling, educating, and coordinating the care of the patient.        Greater than 50% of the visit of 45 minutes was spent counseling, educating, and coordinating the care of the patient.      -In today's visit, at least 4 established conditions that pose a risk to life or bodily function have been addressed and the conditions are severe.    -In today's visit, monitoring for drug toxicity was accomplished.      I have reviewed the patient's medical history in detail and updated the computerized patient record.    Orders Placed This Encounter   Procedures    Lipid panel     Standing Status:   Future     Standing Expiration Date:   5/3/2021    Ambulatory referral/consult to Ophthalmology     Standing Status:   Future     Standing Expiration Date:   4/4/2021     Referral Priority:   Routine     Referral Type:   Consultation     Referral Reason:   Specialty Services Required     Requested Specialty:   Ophthalmology     Number of Visits Requested:   1    EKG 12-lead     Order Specific Question:   Diagnosis     Answer:   PAF (paroxysmal atrial fibrillation) [832562]    EKG 12-lead     Order Specific Question:   Diagnosis     Answer:   CAD (coronary artery disease) [886250]       Follow up as scheduled. Return sooner for concerns or questions                    Patient's Medications   New Prescriptions    AMLODIPINE (NORVASC) 5 MG TABLET    Take 1 tablet (5 mg total) by mouth 2 (two) times daily.   Previous Medications    ALBUTEROL (PROVENTIL/VENTOLIN HFA) 90 MCG/ACTUATION INHALER    INHALE 2 PUFFS INTO THE LUNGS EVERY 6 HOURS AS NEEDED FOR WHEEZING    ASPIRIN (ASPIR-81 ORAL)    Take 1 tablet by mouth.    ATORVASTATIN (LIPITOR) 80 MG TABLET    Take 1 tablet (80 mg total) by mouth once daily.    CILOSTAZOL (PLETAL) 100 MG TAB    TAKE 1 TABLET(100 MG) BY MOUTH TWICE DAILY    CLOPIDOGREL (PLAVIX) 75 MG TABLET    Take 1 tablet (75 mg total) by mouth once daily.    COENZYME Q10 (COQ-10) 100 MG CAPSULE     Take 1 capsule (100 mg total) by mouth once daily.    FLUNISOLIDE 25 MCG, 0.025%, (NASALIDE) 25 MCG (0.025 %) SPRY    2 sprays by Nasal route 2 (two) times daily.    LEVOFLOXACIN (LEVAQUIN) 750 MG TABLET    Take 1 tablet (750 mg total) by mouth once daily. for 7 days    METOPROLOL SUCCINATE (TOPROL-XL) 100 MG 24 HR TABLET    Take 1 tablet (100 mg total) by mouth 2 (two) times daily.    VALSARTAN (DIOVAN) 320 MG TABLET    Take 1 tablet (320 mg total) by mouth once daily.   Modified Medications    No medications on file   Discontinued Medications    HYDRALAZINE (APRESOLINE) 25 MG TABLET    Take 1 tablet (25 mg total) by mouth 3 (three) times daily.

## 2020-03-05 ENCOUNTER — PATIENT MESSAGE (OUTPATIENT)
Dept: CARDIOLOGY | Facility: CLINIC | Age: 60
End: 2020-03-05

## 2020-03-06 ENCOUNTER — TELEPHONE (OUTPATIENT)
Dept: CARDIOLOGY | Facility: CLINIC | Age: 60
End: 2020-03-06

## 2020-03-08 ENCOUNTER — PATIENT MESSAGE (OUTPATIENT)
Dept: INTERNAL MEDICINE | Facility: CLINIC | Age: 60
End: 2020-03-08

## 2020-03-11 ENCOUNTER — TELEPHONE (OUTPATIENT)
Dept: INTERNAL MEDICINE | Facility: CLINIC | Age: 60
End: 2020-03-11

## 2020-03-11 DIAGNOSIS — E87.0 HYPERNATREMIA: Primary | ICD-10-CM

## 2020-03-11 DIAGNOSIS — R05.9 COUGH: Primary | ICD-10-CM

## 2020-03-11 DIAGNOSIS — E88.09 HYPOALBUMINEMIA: ICD-10-CM

## 2020-03-11 RX ORDER — BENZONATATE 200 MG/1
200 CAPSULE ORAL 3 TIMES DAILY PRN
Qty: 30 CAPSULE | Refills: 0 | Status: SHIPPED | OUTPATIENT
Start: 2020-03-11 | End: 2020-03-30

## 2020-03-11 NOTE — TELEPHONE ENCOUNTER
----- Message from Analy Encarnacion MD sent at 3/11/2020  4:43 PM CDT -----  Please schedule patient has for the blood work, urine, and liver ultrasound that I ordered today.  Thank you.  He was sent a portal message about this.

## 2020-03-11 NOTE — TELEPHONE ENCOUNTER
Spoke with patient and test results given with recommendations. Appts scheduled for urine, lab, and ultrasound for 3/20 per patient request. Patient reports he has not had alcohol in 15 years and reports he eats 2 meals a day. Patient voices understanding.

## 2020-03-12 ENCOUNTER — TELEPHONE (OUTPATIENT)
Dept: CARDIOLOGY | Facility: CLINIC | Age: 60
End: 2020-03-12

## 2020-03-12 ENCOUNTER — PATIENT MESSAGE (OUTPATIENT)
Dept: CARDIOLOGY | Facility: CLINIC | Age: 60
End: 2020-03-12

## 2020-03-18 ENCOUNTER — PATIENT OUTREACH (OUTPATIENT)
Dept: OTHER | Facility: OTHER | Age: 60
End: 2020-03-18

## 2020-03-18 DIAGNOSIS — I10 ESSENTIAL HYPERTENSION: Primary | ICD-10-CM

## 2020-03-18 RX ORDER — CARVEDILOL 25 MG/1
25 TABLET ORAL 2 TIMES DAILY WITH MEALS
Qty: 60 TABLET | Refills: 5 | Status: SHIPPED | OUTPATIENT
Start: 2020-03-18 | End: 2020-04-17

## 2020-03-18 NOTE — PROGRESS NOTES
Digital Medicine: Clinician Follow-Up    Called patient for digital medicine follow up. Mr. Gross's blood pressure had been improving after starting amlodipine however, patient reported significant EDDIE and medication was discontinued by cardiologist. EDDIE is somewhat improved however, shoes and socks are still uncomfortable. Explained that swelling is likely related to CCB however, we discussed diet as well. Dinner last night was Frito pie and the night before was a hamburger. Patient says that he does not cook with with salt however, he does not read labels.     The history is provided by the patient. No  was used.     Follow Up  Follow-up reason(s): reading review and medication change      Medication Change: new med            Assessment:  Patient's current 30-day average is not at goal of <130/80 mmHg.       Plan:  Continue holding amlodipine for now. Consider re-challenging at a lower dose when EDDIE resolves.  Metoprolol changed to carvedilol 25 mg twice daily. Continue valsartan 320 mg.  Note routed to health  to discuss low sodium diet in further detail.   Patients health , Crystal Nair, will follow-up as scheduled.    I will continue to monitor regularly and will follow-up in 2 weeks, sooner if blood pressure begins to trend upward or downward.       Patient has my contact information and knows to call with any concerns or clinical changes.            There are no preventive care reminders to display for this patient.    Last 5 Patient Entered Readings                                      Current 30 Day Average: 182/93     Recent Readings 3/16/2020 3/15/2020 3/13/2020 3/12/2020 3/11/2020    SBP (mmHg) 195 181 156 157 157    DBP (mmHg) 98 87 81 81 78    Pulse 85 79 75 84 75             Hypertension Medications             amLODIPine (NORVASC) 5 MG tablet Take 1 tablet (5 mg total) by mouth 2 (two) times daily.    carvediloL (COREG) 25 MG tablet Take 1 tablet (25 mg total) by  mouth 2 (two) times daily with meals.    valsartan (DIOVAN) 320 MG tablet Take 1 tablet (320 mg total) by mouth once daily.                 Screenings

## 2020-03-19 ENCOUNTER — PATIENT MESSAGE (OUTPATIENT)
Dept: ADMINISTRATIVE | Facility: OTHER | Age: 60
End: 2020-03-19

## 2020-03-20 ENCOUNTER — LAB VISIT (OUTPATIENT)
Dept: LAB | Facility: HOSPITAL | Age: 60
End: 2020-03-20
Attending: INTERNAL MEDICINE
Payer: COMMERCIAL

## 2020-03-20 ENCOUNTER — TELEPHONE (OUTPATIENT)
Dept: INTERNAL MEDICINE | Facility: CLINIC | Age: 60
End: 2020-03-20

## 2020-03-20 ENCOUNTER — HOSPITAL ENCOUNTER (OUTPATIENT)
Dept: RADIOLOGY | Facility: HOSPITAL | Age: 60
Discharge: HOME | End: 2020-03-20
Attending: INTERNAL MEDICINE
Payer: COMMERCIAL

## 2020-03-20 DIAGNOSIS — E88.09 HYPOALBUMINEMIA: ICD-10-CM

## 2020-03-20 DIAGNOSIS — E87.6 HYPOKALEMIA: Primary | ICD-10-CM

## 2020-03-20 DIAGNOSIS — E87.0 HYPERNATREMIA: ICD-10-CM

## 2020-03-20 LAB
ALBUMIN SERPL BCP-MCNC: 2 G/DL (ref 3.5–5.2)
ALP SERPL-CCNC: 98 U/L (ref 55–135)
ALT SERPL W/O P-5'-P-CCNC: 16 U/L (ref 10–44)
ANION GAP SERPL CALC-SCNC: 6 MMOL/L (ref 8–16)
APTT BLDCRRT: 25.8 SEC (ref 21–32)
AST SERPL-CCNC: 26 U/L (ref 10–40)
BASOPHILS # BLD AUTO: 0.05 K/UL (ref 0–0.2)
BASOPHILS NFR BLD: 0.8 % (ref 0–1.9)
BILIRUB SERPL-MCNC: 0.4 MG/DL (ref 0.1–1)
BUN SERPL-MCNC: 14 MG/DL (ref 6–20)
CALCIUM SERPL-MCNC: 7.9 MG/DL (ref 8.7–10.5)
CHLORIDE SERPL-SCNC: 110 MMOL/L (ref 95–110)
CO2 SERPL-SCNC: 27 MMOL/L (ref 23–29)
CREAT SERPL-MCNC: 1.4 MG/DL (ref 0.5–1.4)
DIFFERENTIAL METHOD: ABNORMAL
EOSINOPHIL # BLD AUTO: 0.4 K/UL (ref 0–0.5)
EOSINOPHIL NFR BLD: 6.6 % (ref 0–8)
ERYTHROCYTE [DISTWIDTH] IN BLOOD BY AUTOMATED COUNT: 13.4 % (ref 11.5–14.5)
EST. GFR  (AFRICAN AMERICAN): >60 ML/MIN/1.73 M^2
EST. GFR  (NON AFRICAN AMERICAN): 55 ML/MIN/1.73 M^2
GLUCOSE SERPL-MCNC: 94 MG/DL (ref 70–110)
HCT VFR BLD AUTO: 29.4 % (ref 40–54)
HGB BLD-MCNC: 9.5 G/DL (ref 14–18)
IMM GRANULOCYTES # BLD AUTO: 0.01 K/UL (ref 0–0.04)
IMM GRANULOCYTES NFR BLD AUTO: 0.2 % (ref 0–0.5)
INR PPP: 1 (ref 0.8–1.2)
LYMPHOCYTES # BLD AUTO: 1.7 K/UL (ref 1–4.8)
LYMPHOCYTES NFR BLD: 27.8 % (ref 18–48)
MCH RBC QN AUTO: 26 PG (ref 27–31)
MCHC RBC AUTO-ENTMCNC: 32.3 G/DL (ref 32–36)
MCV RBC AUTO: 80 FL (ref 82–98)
MONOCYTES # BLD AUTO: 0.5 K/UL (ref 0.3–1)
MONOCYTES NFR BLD: 8.2 % (ref 4–15)
NEUTROPHILS # BLD AUTO: 3.4 K/UL (ref 1.8–7.7)
NEUTROPHILS NFR BLD: 56.4 % (ref 38–73)
NRBC BLD-RTO: 0 /100 WBC
PLATELET # BLD AUTO: 269 K/UL (ref 150–350)
PMV BLD AUTO: 9.7 FL (ref 9.2–12.9)
POTASSIUM SERPL-SCNC: 2.9 MMOL/L (ref 3.5–5.1)
PROT SERPL-MCNC: 5.5 G/DL (ref 6–8.4)
PROTHROMBIN TIME: 10.6 SEC (ref 9–12.5)
RBC # BLD AUTO: 3.66 M/UL (ref 4.6–6.2)
SODIUM SERPL-SCNC: 143 MMOL/L (ref 136–145)
WBC # BLD AUTO: 6.09 K/UL (ref 3.9–12.7)

## 2020-03-20 PROCEDURE — 76705 ECHO EXAM OF ABDOMEN: CPT | Mod: TC

## 2020-03-20 PROCEDURE — 80053 COMPREHEN METABOLIC PANEL: CPT

## 2020-03-20 PROCEDURE — 85610 PROTHROMBIN TIME: CPT

## 2020-03-20 PROCEDURE — 85730 THROMBOPLASTIN TIME PARTIAL: CPT

## 2020-03-20 PROCEDURE — 76705 US ABDOMEN LIMITED: ICD-10-PCS | Mod: 26,,, | Performed by: RADIOLOGY

## 2020-03-20 PROCEDURE — 85025 COMPLETE CBC W/AUTO DIFF WBC: CPT

## 2020-03-20 PROCEDURE — 76705 ECHO EXAM OF ABDOMEN: CPT | Mod: 26,,, | Performed by: RADIOLOGY

## 2020-03-20 PROCEDURE — 36415 COLL VENOUS BLD VENIPUNCTURE: CPT

## 2020-03-20 RX ORDER — POTASSIUM CHLORIDE 20 MEQ/1
TABLET, EXTENDED RELEASE ORAL
Qty: 180 TABLET | Refills: 11 | Status: SHIPPED | OUTPATIENT
Start: 2020-03-20 | End: 2020-03-25 | Stop reason: ALTCHOICE

## 2020-03-20 NOTE — TELEPHONE ENCOUNTER
Spoke with patient on phone. Told him of severely low potassium level.  Advised him that he should go to the emergency department as a severely low potassium level can lead to cardiac arrhythmias and ultimately death.  However even after being advised of this, I did not get the impression that the patient will go to the emergency department, despite my recommendation.  Therefore I am sending in a prescription for oral potassium and will have him recheck a BMP in 1 week.

## 2020-03-21 ENCOUNTER — PATIENT MESSAGE (OUTPATIENT)
Dept: INTERNAL MEDICINE | Facility: CLINIC | Age: 60
End: 2020-03-21

## 2020-03-23 ENCOUNTER — PATIENT MESSAGE (OUTPATIENT)
Dept: CARDIOLOGY | Facility: CLINIC | Age: 60
End: 2020-03-23

## 2020-03-23 DIAGNOSIS — R80.9 PROTEINURIA, UNSPECIFIED TYPE: Primary | ICD-10-CM

## 2020-03-23 DIAGNOSIS — N18.30 CHRONIC KIDNEY DISEASE, STAGE 3: ICD-10-CM

## 2020-03-24 ENCOUNTER — TELEPHONE (OUTPATIENT)
Dept: OPHTHALMOLOGY | Facility: CLINIC | Age: 60
End: 2020-03-24

## 2020-03-24 DIAGNOSIS — I10 ESSENTIAL HYPERTENSION: ICD-10-CM

## 2020-03-24 RX ORDER — VALSARTAN 320 MG/1
320 TABLET ORAL DAILY
Qty: 90 TABLET | Refills: 1 | Status: SHIPPED | OUTPATIENT
Start: 2020-03-24 | End: 2020-04-17 | Stop reason: SDUPTHER

## 2020-03-25 ENCOUNTER — TELEPHONE (OUTPATIENT)
Dept: NEPHROLOGY | Facility: CLINIC | Age: 60
End: 2020-03-25

## 2020-03-25 ENCOUNTER — TELEPHONE (OUTPATIENT)
Dept: CARDIOLOGY | Facility: CLINIC | Age: 60
End: 2020-03-25

## 2020-03-25 ENCOUNTER — TELEPHONE (OUTPATIENT)
Dept: CARDIOLOGY | Facility: HOSPITAL | Age: 60
End: 2020-03-25

## 2020-03-25 DIAGNOSIS — R05.9 COUGH: ICD-10-CM

## 2020-03-25 RX ORDER — SPIRONOLACTONE 25 MG/1
25 TABLET ORAL DAILY
Qty: 90 TABLET | Refills: 3 | Status: SHIPPED | OUTPATIENT
Start: 2020-03-25 | End: 2020-04-17

## 2020-03-25 NOTE — TELEPHONE ENCOUNTER
Please stop potassium supplement as well       We will start aldactone 25 mg daily it is a diuretic and spares potassium loss       We have to monitor your renal function closely       It was sent to pharmacy       Thanks      Please keep us posted        ZN

## 2020-03-25 NOTE — TELEPHONE ENCOUNTER
----- Message from Courtney Byers sent at 3/25/2020  3:00 PM CDT -----  Good afternoon,  The patient has a new referral from Dr. Analy Encarnacion, the diagnosis is Proteinuria, unspecified type // Chronic kidney disease, stage 3. Can you assist in scheduling patient an office visit or a video visit?    Thank you

## 2020-03-25 NOTE — TELEPHONE ENCOUNTER
Please stop potassium supplement as well         We will start aldactone 25 mg daily it is a diuretic and spares potassium loss         We have to monitor your renal function closely         It was sent to pharmacy         Thanks        Please keep us posted           ZN         Documentation          I called patient and Left him V/Message  At [phone]    Regarding Stopping Potissum Supplement, and    And starting  Aldactone 25 mg daily.    Mr. Gross ,    Please keep in touch with us.    Please call us with any orther questions  Or concerns.      Madina Wick (rita).

## 2020-03-27 ENCOUNTER — OFFICE VISIT (OUTPATIENT)
Dept: NEPHROLOGY | Facility: CLINIC | Age: 60
End: 2020-03-27
Payer: COMMERCIAL

## 2020-03-27 DIAGNOSIS — R80.9 NEPHROTIC RANGE PROTEINURIA: ICD-10-CM

## 2020-03-27 DIAGNOSIS — N18.30 HYPERTENSIVE KIDNEY DISEASE WITH STAGE 3 CHRONIC KIDNEY DISEASE: ICD-10-CM

## 2020-03-27 DIAGNOSIS — E87.6 HYPOKALEMIA: Primary | ICD-10-CM

## 2020-03-27 DIAGNOSIS — N18.30 CKD (CHRONIC KIDNEY DISEASE), STAGE III: ICD-10-CM

## 2020-03-27 DIAGNOSIS — R80.9 PROTEINURIA, UNSPECIFIED TYPE: ICD-10-CM

## 2020-03-27 DIAGNOSIS — N18.30 CHRONIC KIDNEY DISEASE, STAGE 3: ICD-10-CM

## 2020-03-27 DIAGNOSIS — I12.9 HYPERTENSIVE KIDNEY DISEASE WITH STAGE 3 CHRONIC KIDNEY DISEASE: ICD-10-CM

## 2020-03-27 PROCEDURE — 99204 OFFICE O/P NEW MOD 45 MIN: CPT | Mod: 95,,, | Performed by: INTERNAL MEDICINE

## 2020-03-27 PROCEDURE — 99204 PR OFFICE/OUTPT VISIT, NEW, LEVL IV, 45-59 MIN: ICD-10-PCS | Mod: 95,,, | Performed by: INTERNAL MEDICINE

## 2020-03-27 NOTE — LETTER
April 6, 2020      Analy Encarnacion MD  [address]  [place] [address]           [facility] Nephrology  [address]  [place] [address]  Phone: [phone]  Fax: [phone]          Patient: Domingo Gross   MR Number: [id number]   YOB: 1960   Date of Visit: 3/27/2020       Dear Dr. Analy Encarnacion:    Thank you for referring Domingo Gross to me for evaluation. Attached you will find relevant portions of my assessment and plan of care.    If you have questions, please do not hesitate to call me. I look forward to following Domingo Gross along with you.    Sincerely,    Freddie Muñoz MD    Enclosure  CC:  No Recipients    If you would like to receive this communication electronically, please contact [email or web address] or [phone] to request more information on [employer] access.    For providers and/or their staff who would like to refer a patient to [facility], please contact us through our one-stop-shop provider referral line, [facility] [place], at [phone].    If you feel you have received this communication in error or would no longer like to receive these types of communications, please e-mail [email or web address]

## 2020-03-30 ENCOUNTER — LAB VISIT (OUTPATIENT)
Dept: LAB | Facility: HOSPITAL | Age: 60
End: 2020-03-30
Attending: INTERNAL MEDICINE
Payer: COMMERCIAL

## 2020-03-30 ENCOUNTER — TELEPHONE (OUTPATIENT)
Dept: INTERNAL MEDICINE | Facility: CLINIC | Age: 60
End: 2020-03-30

## 2020-03-30 ENCOUNTER — PATIENT MESSAGE (OUTPATIENT)
Dept: INTERNAL MEDICINE | Facility: CLINIC | Age: 60
End: 2020-03-30

## 2020-03-30 DIAGNOSIS — E87.6 HYPOKALEMIA: ICD-10-CM

## 2020-03-30 DIAGNOSIS — R05.9 COUGH: ICD-10-CM

## 2020-03-30 LAB
ANION GAP SERPL CALC-SCNC: 8 MMOL/L (ref 8–16)
BUN SERPL-MCNC: 12 MG/DL (ref 6–20)
CALCIUM SERPL-MCNC: 7.8 MG/DL (ref 8.7–10.5)
CHLORIDE SERPL-SCNC: 109 MMOL/L (ref 95–110)
CO2 SERPL-SCNC: 26 MMOL/L (ref 23–29)
CREAT SERPL-MCNC: 1.4 MG/DL (ref 0.5–1.4)
EST. GFR  (AFRICAN AMERICAN): >60 ML/MIN/1.73 M^2
EST. GFR  (NON AFRICAN AMERICAN): 55 ML/MIN/1.73 M^2
GLUCOSE SERPL-MCNC: 99 MG/DL (ref 70–110)
POTASSIUM SERPL-SCNC: 2.9 MMOL/L (ref 3.5–5.1)
SODIUM SERPL-SCNC: 143 MMOL/L (ref 136–145)

## 2020-03-30 PROCEDURE — 36415 COLL VENOUS BLD VENIPUNCTURE: CPT | Mod: PO

## 2020-03-30 PROCEDURE — 80048 BASIC METABOLIC PNL TOTAL CA: CPT

## 2020-03-30 RX ORDER — BENZONATATE 200 MG/1
CAPSULE ORAL
Qty: 30 CAPSULE | Refills: 0 | Status: SHIPPED | OUTPATIENT
Start: 2020-03-30 | End: 2020-03-30 | Stop reason: SDUPTHER

## 2020-03-30 RX ORDER — BENZONATATE 200 MG/1
200 CAPSULE ORAL 3 TIMES DAILY PRN
Qty: 90 CAPSULE | Refills: 0 | Status: SHIPPED | OUTPATIENT
Start: 2020-03-30 | End: 2021-06-21

## 2020-03-30 NOTE — PROGRESS NOTES
Digital Medicine: Health  Follow-Up    I called Mr. Gross to follow up and discuss his high blood pressure alert this morning. He denies any high blood pressure symptoms, and he feels fine despite his blood pressure being elevated. 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 He is getting more sleep during the day. Due to the coronavirus, he and his wife are cooking more at home. He states that he is monitoring his sodium intake by reading food labels and switching to [employer] No Salt seasoning. He is drinking 3-4 cups of coffee per night and about 3 16 ounce bottles of water. We discussed increasing to 4 bottles. He is open to it. He would just like to focus on finding the right dosage of medication. He is very active at work.     The history is provided by the patient. No  was used.     Follow Up  Follow-up reason(s): reading review      Alert received.   Care Team received high BP alert.  Patient is not experiencing symptoms.      INTERVENTION(S)  recommended diet modifications, recommend physical activity, encouragement/support and denied questions    PLAN  patient verbalizes understanding, patient amenable to changes and continue monitoring      There are no preventive care reminders to display for this patient.    Last 5 Patient Entered Readings                                      Current 30 Day Average: 181/92     Recent Readings 3/30/2020 3/29/2020 3/28/2020 3/27/2020 3/26/2020    SBP (mmHg) 187 189 177 179 204    DBP (mmHg) 86 86 85 90 107    Pulse 71 79 69 79 72                      Diet Screening   Patient reports eating or drinking the following: caffeine and Home cooked meals     The patient drinks 48 ounces of water per day.    He has the following dietary restrictions: low sodium diet    He states that his wife cooks the meals, but he buys the groceries and is looking at nutrition labels. He is calculating his total amount by looking at the percentages.  He knows that if he eats this serving, it will be 23% of his sodium value for the day and he subtracts that from 100. He does this for all his foods. He states that his wife is using [employer] no salt seasoning. They are cooking more since most restaurants are closed. He drinks 3-4 cups of coffee per night and he drinks 3 16 ounce bottles of water as well.     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 He states they are now allowed to use the elevators.     Medication Adherence Screening   He did not miss a dose this month.      SDOH

## 2020-03-30 NOTE — TELEPHONE ENCOUNTER
"Spoke to patient. Advised patient of lab results and informed patient to go to ER for evaluation and treatment. Patient stated he "doesn't want to waste his time going to the ER" "My cardiologist took me off potassium last week and put me on this carvedilol" Again advised patient to go to ER for low potassium levels, informed patient I would let Dr know of change in medication, patient voices understanding.  "

## 2020-04-01 ENCOUNTER — PATIENT OUTREACH (OUTPATIENT)
Dept: OTHER | Facility: OTHER | Age: 60
End: 2020-04-01

## 2020-04-01 DIAGNOSIS — I10 ESSENTIAL HYPERTENSION: Primary | ICD-10-CM

## 2020-04-02 ENCOUNTER — TELEPHONE (OUTPATIENT)
Dept: INTERNAL MEDICINE | Facility: CLINIC | Age: 60
End: 2020-04-02

## 2020-04-02 DIAGNOSIS — E87.6 HYPOKALEMIA: Primary | ICD-10-CM

## 2020-04-02 RX ORDER — POTASSIUM CHLORIDE 20 MEQ/1
20 TABLET, EXTENDED RELEASE ORAL DAILY
Qty: 7 TABLET | Refills: 0 | Status: SHIPPED | OUTPATIENT
Start: 2020-04-02 | End: 2020-04-09

## 2020-04-02 NOTE — TELEPHONE ENCOUNTER
----- Message from Analy Encarnacion MD sent at 4/2/2020  3:37 PM CDT -----  Please let Mr. Gross know that I do not see any evidence he went to the emergency department.  Therefore, if he will not go to the emergency department as instructed, I recommend that he take 20 meq daily, which he can take in addition to the spironolactone and we will recheck labs in one week. I did also inform Dr. Jenkins of his low potassium. Thanks

## 2020-04-02 NOTE — TELEPHONE ENCOUNTER
"Spoke with patient and informed Kt level low and needs to start Kt 20 meq by mouth daily. Patient states "I'm not doing that because my Cardiologist took me off the Kt supp and put me on something else." Informed patient Dr. Encarnacion did inform Cardio of Kt level and restarting Kt supp. Informed can take the Kt in addition to the Spirolactone for one week and will recheck Kt level in one week. Lab appt scheduled. Patient voices understanding.  "

## 2020-04-06 ENCOUNTER — PATIENT MESSAGE (OUTPATIENT)
Dept: CARDIOLOGY | Facility: CLINIC | Age: 60
End: 2020-04-06

## 2020-04-06 PROBLEM — N18.30 HYPERTENSIVE KIDNEY DISEASE WITH STAGE 3 CHRONIC KIDNEY DISEASE: Status: ACTIVE | Noted: 2020-04-06

## 2020-04-06 PROBLEM — I12.9 HYPERTENSIVE KIDNEY DISEASE WITH STAGE 3 CHRONIC KIDNEY DISEASE: Status: ACTIVE | Noted: 2020-04-06

## 2020-04-06 PROBLEM — N18.30 CKD (CHRONIC KIDNEY DISEASE), STAGE III: Status: ACTIVE | Noted: 2020-04-06

## 2020-04-06 PROBLEM — R80.9 NEPHROTIC RANGE PROTEINURIA: Status: ACTIVE | Noted: 2020-04-06

## 2020-04-06 NOTE — PROGRESS NOTES
Subjective:   Patient ID: Domingo Gross is a 59 y.o. White male who presents for new evaluation of Chronic Kidney Disease  pt was seen in virtual clinic.       The patient location is: home in [place]   The chief complaint leading to consultation is: CKD  Visit type: Virtual visit with synchronous audio and video  Total time spent with patient: 25 minutes face to face   Each patient to whom he or she provides medical services by telemedicine is:  (1) informed of the relationship between the physician and patient and the respective role of any other health care provider with respect to management of the patient; and (2) notified that he or she may decline to receive medical services by telemedicine and may withdraw from such care at any time.    HPI   was seen in virtual clinic for new patient evaluation of CKD. Since this was his first visit with me I reviewed his prior pertinent chart.     He has hypertension, hyperlipidemia, CAD, s/p cardiac arrest in October 2019, s/p LHC that time, CKD, PVD, carotid artery disease, anemia, DJD lumbar spine,  and other medical problems. He has exposure to IV iodine contrast during LHC, previously during CTA and peripheral angiogram. He has extensive problems with PAD and has had multiple vascular interventions so far.     He denies any recent use of NSAID. He has remote h/o tobacco smoking. He is not aware of any family h/o kidney disease.    Most recently he has had problems with sub optimal control of hypertension. He denies any decreased oral intake/ nausea/ vomiting/ decreased urine/ flank pain. He describes having swelling of both legs. His antihypertensive medications are being managed by his PCP and cardiologist. Due to hypokalemia, he has been placed on spironolactone. He has been on ARB. He denies any muscle weakness/ tingling.     Serial labs noted for onset of CKD in 2018 with progression and episodes of BRETT in October 2019 around the time of his cardiac  arrest. Since then his CKD has progressed.     He has moderate to severe anemia which is investigated by his PCP. Limited urine studies show nephrotic proteinuria. Of note within [facility] system he did not have urine studies between 2013 and 2020. He could not report details of medical work up in the interim period. He has moderate hypoalbuminemia.     Renal Function:  Lab Results   Component Value Date    GLU 99 03/30/2020    GLU 94 03/20/2020     03/30/2020     03/20/2020    K 2.9 (L) 03/30/2020    K 2.9 (L) 03/20/2020     03/30/2020     03/20/2020    CO2 26 03/30/2020    CO2 27 03/20/2020    BUN 12 03/30/2020    BUN 14 03/20/2020    CALCIUM 7.8 (L) 03/30/2020    CALCIUM 7.9 (L) 03/20/2020    CREATININE 1.4 03/30/2020    CREATININE 1.4 03/20/2020    ALBUMIN 2.0 (L) 03/20/2020    ALBUMIN 1.9 (L) 03/02/2020    PHOS 2.8 01/31/2020    ESTGFRAFRICA >60.0 03/30/2020    ESTGFRAFRICA >60 03/20/2020    EGFRNONAA 55.0 (A) 03/30/2020    EGFRNONAA 55 (A) 03/20/2020       Urinalysis:  Lab Results   Component Value Date    APPEARANCEUA Clear 03/20/2020    PHUR 7.0 03/20/2020    SPECGRAV 1.020 03/20/2020    PROTEINUA 3+ (A) 03/20/2020    GLUCUA Negative 03/20/2020    OCCULTUA 2+ (A) 03/20/2020    NITRITE Negative 03/20/2020    LEUKOCYTESUR Negative 03/20/2020       Protein/Creatinine Ratio:  Lab Results   Component Value Date    CREATRANDUR 105.0 03/20/2020       CBC:  Lab Results   Component Value Date    WBC 6.09 03/20/2020    HGB 9.5 (L) 03/20/2020    HCT 29.4 (L) 03/20/2020       PTH:  No results found for: PTH    Review of Systems   Constitutional: Negative for activity change, appetite change, chills, fatigue and fever.   HENT: Negative for facial swelling, hearing loss, sneezing and sore throat.    Eyes: Negative for discharge and visual disturbance.   Respiratory: Negative for cough, shortness of breath and wheezing.    Cardiovascular: Positive for leg swelling. Negative for chest pain.    Gastrointestinal: Negative for abdominal pain, diarrhea, nausea and vomiting.   Endocrine: Negative for polydipsia and polyuria.   Genitourinary: Negative for dysuria, flank pain, frequency and hematuria.   Musculoskeletal: Negative for back pain and myalgias.   Skin: Negative for pallor and rash.   Allergic/Immunologic: Negative for environmental allergies.   Neurological: Negative for dizziness, light-headedness and headaches.   Psychiatric/Behavioral: Negative for behavioral problems. The patient is not nervous/anxious.        Objective:   Physical Exam   Constitutional: He is oriented to person, place, and time. He appears well-developed. No distress.   Pulmonary/Chest: Effort normal. No respiratory distress.   Neurological: He is alert and oriented to person, place, and time.   Skin: He is not diaphoretic.   Psychiatric: He has a normal mood and affect.   limited exam due to virtual visit     Assessment:     1. Hypokalemia    2. Proteinuria, unspecified type    3. Chronic kidney disease, stage 3    4. CKD (chronic kidney disease), stage III    5. Nephrotic range proteinuria    6. Hypertensive kidney disease with stage 3 chronic kidney disease        Plan:       Problem List Items Addressed This Visit        Renal/    Hypokalemia - Primary    Relevant Orders    CBC auto differential    Hepatitis B Surface Antigen    Hepatitis C Antibody    Immunofixation electrophoresis    Protein electrophoresis, serum    PTH, intact    Renal function panel    Vitamin D    Uric acid    C4 complement    C3 complement    Anti-neutrophilic cytoplasmic antibody    Urinalysis    Protein / creatinine ratio, urine    US Retroperitoneal Complete (Kidney and    CKD (chronic kidney disease), stage III    Relevant Orders    CBC auto differential    Hepatitis B Surface Antigen    Hepatitis C Antibody    Immunofixation electrophoresis    Protein electrophoresis, serum    PTH, intact    Renal function panel    Vitamin D    Uric acid     C4 complement    C3 complement    Anti-neutrophilic cytoplasmic antibody    Urinalysis    Protein / creatinine ratio, urine    US Retroperitoneal Complete (Kidney and    Nephrotic range proteinuria    Relevant Orders    CBC auto differential    Hepatitis B Surface Antigen    Hepatitis C Antibody    Immunofixation electrophoresis    Protein electrophoresis, serum    PTH, intact    Renal function panel    Vitamin D    Uric acid    C4 complement    C3 complement    Anti-neutrophilic cytoplasmic antibody    Urinalysis    Protein / creatinine ratio, urine    US Retroperitoneal Complete (Kidney and    Hypertensive kidney disease with stage 3 chronic kidney disease    Relevant Orders    CBC auto differential    Hepatitis B Surface Antigen    Hepatitis C Antibody    Immunofixation electrophoresis    Protein electrophoresis, serum    PTH, intact    Renal function panel    Vitamin D    Uric acid    C4 complement    C3 complement    Anti-neutrophilic cytoplasmic antibody    Urinalysis    Protein / creatinine ratio, urine    US Retroperitoneal Complete (Kidney and      Other Visit Diagnoses     Proteinuria, unspecified type        Relevant Orders    CBC auto differential    Hepatitis B Surface Antigen    Hepatitis C Antibody    Immunofixation electrophoresis    Protein electrophoresis, serum    PTH, intact    Renal function panel    Vitamin D    Uric acid    C4 complement    C3 complement    Anti-neutrophilic cytoplasmic antibody    Urinalysis    Protein / creatinine ratio, urine    US Retroperitoneal Complete (Kidney and    Chronic kidney disease, stage 3        Relevant Orders    CBC auto differential    Hepatitis B Surface Antigen    Hepatitis C Antibody    Immunofixation electrophoresis    Protein electrophoresis, serum    PTH, intact    Renal function panel    Vitamin D    Uric acid    C4 complement    C3 complement    Anti-neutrophilic cytoplasmic antibody    Urinalysis    Protein / creatinine ratio, urine    US  Retroperitoneal Complete (Kidney and        Mr. Gross has CKD III is due to atherosclerotic vascular disease, hypertensive nephrosclerosis, prior multiple exposures to contrast leading to BRETT and progression of CKD. He has nephrotic range proteinuria. Onset of this is not exactly known as between 2013 and 2020 he did not have any urine studies. He could have underlying primary GN. Hypertensive glomerulosclerosis could be contributing but cannot explain the severity of proteinuria entirely. Will obtain serologic work up, see orders, US kidneys to evaluate kidney size. Currently with his need to use dual antiplatelet agents in the context of cardiac arrest in October 2019 when per cardiology note he has been advised dual antiplatelet use for one year, it is difficult to offer him percutaneous kidney biopsy which will mandate holding these agents for at least 5 days prior to the procedure. Patient was explained about this in detail. He expressed he was not interested in doing kidney biopsy currently but agreed to have work up as detailed otherwise.    I had a detailed discussion with patient about his CKD diagnosis, CKD staging, interpretation of serial labs pertaining to his kidneys, potential risk of progression of CKD. Possible etiology of his CKD, need for improved BP control, strict low salt diet, avoiding any NSAID, having periodic monitoring of renal labs to assess and treat any electrolyte disturbance, acid base disorder, to follow progress of CKD with creatinine and eGFR. I stressed to have BP monitoring at home and to bring readings for review with his future visits with MD.     - periodically monitor renal panel for electrolytes, acid base status, eGFR  - CKD staging, diagnosis, onset of CKD, potential risk of CKD progression, potential risk of BRETT due to volume depletion/ ANDREA/ ATN due to hemodynamic changes d/w patient  - stress to follow low salt diet  - K sparing diuretic per his cardiologist   - trend  PTH levels, vit D, phos, corrected Ca and treat as necessary   - trend urine studies for proteinuria  - obtain screening labs for hep C/B, labs to rule out paraproteinemia and other serologies   - obtain US kidney for kidney size, rule out mass/ cyst  - avoid NSAID/ bactrim/ IV contrast/ gadolinium/ aminoglycoside/ fleet enema/ PPI where possible  - continue ARB containing regimen for RAAS blockade with close monitoring of potassium levels. Pt encouraged to monitor BP at home, goal BP level d/w patient. His goal should be less than 130/80.  - anemia seems disproportionate to the level of CKD. Work up as per PCP.     Plan, labs, recommendations were discussed with patient, his questions were answered to his satisfaction.     Follow up in about 3 months (around 6/27/2020).

## 2020-04-07 ENCOUNTER — PATIENT MESSAGE (OUTPATIENT)
Dept: OPHTHALMOLOGY | Facility: CLINIC | Age: 60
End: 2020-04-07

## 2020-04-09 ENCOUNTER — LAB VISIT (OUTPATIENT)
Dept: LAB | Facility: HOSPITAL | Age: 60
End: 2020-04-09
Attending: INTERNAL MEDICINE
Payer: COMMERCIAL

## 2020-04-09 ENCOUNTER — PATIENT MESSAGE (OUTPATIENT)
Dept: CARDIOLOGY | Facility: CLINIC | Age: 60
End: 2020-04-09

## 2020-04-09 ENCOUNTER — PATIENT MESSAGE (OUTPATIENT)
Dept: INTERNAL MEDICINE | Facility: CLINIC | Age: 60
End: 2020-04-09

## 2020-04-09 DIAGNOSIS — I25.111 CORONARY ARTERY DISEASE INVOLVING NATIVE CORONARY ARTERY OF NATIVE HEART WITH ANGINA PECTORIS WITH DOCUMENTED SPASM: ICD-10-CM

## 2020-04-09 DIAGNOSIS — E87.6 HYPOKALEMIA: ICD-10-CM

## 2020-04-09 DIAGNOSIS — I12.9 HYPERTENSIVE KIDNEY DISEASE WITH STAGE 3 CHRONIC KIDNEY DISEASE: ICD-10-CM

## 2020-04-09 DIAGNOSIS — E87.6 HYPOKALEMIA: Primary | ICD-10-CM

## 2020-04-09 DIAGNOSIS — N18.30 HYPERTENSIVE KIDNEY DISEASE WITH STAGE 3 CHRONIC KIDNEY DISEASE: ICD-10-CM

## 2020-04-09 DIAGNOSIS — I25.2 HISTORY OF MI (MYOCARDIAL INFARCTION): ICD-10-CM

## 2020-04-09 DIAGNOSIS — I70.211 ATHEROSCLEROSIS OF NATIVE ARTERY OF RIGHT LOWER EXTREMITY WITH INTERMITTENT CLAUDICATION: Primary | ICD-10-CM

## 2020-04-09 LAB
ANION GAP SERPL CALC-SCNC: 7 MMOL/L (ref 8–16)
BUN SERPL-MCNC: 12 MG/DL (ref 6–20)
CALCIUM SERPL-MCNC: 7.9 MG/DL (ref 8.7–10.5)
CHLORIDE SERPL-SCNC: 110 MMOL/L (ref 95–110)
CO2 SERPL-SCNC: 26 MMOL/L (ref 23–29)
CREAT SERPL-MCNC: 1.3 MG/DL (ref 0.5–1.4)
EST. GFR  (AFRICAN AMERICAN): >60 ML/MIN/1.73 M^2
EST. GFR  (NON AFRICAN AMERICAN): >60 ML/MIN/1.73 M^2
GLUCOSE SERPL-MCNC: 92 MG/DL (ref 70–110)
POTASSIUM SERPL-SCNC: 3 MMOL/L (ref 3.5–5.1)
SODIUM SERPL-SCNC: 143 MMOL/L (ref 136–145)

## 2020-04-09 PROCEDURE — 36415 COLL VENOUS BLD VENIPUNCTURE: CPT | Mod: PO

## 2020-04-09 PROCEDURE — 80048 BASIC METABOLIC PNL TOTAL CA: CPT

## 2020-04-10 ENCOUNTER — TELEPHONE (OUTPATIENT)
Dept: INTERNAL MEDICINE | Facility: CLINIC | Age: 60
End: 2020-04-10

## 2020-04-10 ENCOUNTER — PATIENT MESSAGE (OUTPATIENT)
Dept: FAMILY MEDICINE | Facility: CLINIC | Age: 60
End: 2020-04-10

## 2020-04-11 NOTE — TELEPHONE ENCOUNTER
Spoke to patient on phone yesterday at about 4 pm. He wanted me to be aware that he got laid off from his job and he will lose his insurance at the end of this month. Then he will no longer be able to afford to see me or his cardiologist in clinic visits.Discussed with him that I will consult case management and social work to see if they can assist him.     We also discussed his potassium level. In the past he has been on as much as 80 meq per day. He is currently taking 20 meq per day + spironolactone and his potassium level is 3.0. I have asked him to increase his potassium intake to 40 meq daily and continue the spironolactone, and we will continue to check potassium level every one-two weeks until it is in a normal range, at which time we will plan to stop the potassium supplement and continue the spironolactone.    Patient understands and agrees with plans.

## 2020-04-13 ENCOUNTER — PATIENT MESSAGE (OUTPATIENT)
Dept: INTERNAL MEDICINE | Facility: CLINIC | Age: 60
End: 2020-04-13

## 2020-04-14 ENCOUNTER — PATIENT MESSAGE (OUTPATIENT)
Dept: CARDIOLOGY | Facility: CLINIC | Age: 60
End: 2020-04-14

## 2020-04-14 ENCOUNTER — TELEPHONE (OUTPATIENT)
Dept: CARDIOLOGY | Facility: HOSPITAL | Age: 60
End: 2020-04-14

## 2020-04-17 ENCOUNTER — TELEPHONE (OUTPATIENT)
Dept: CARDIOLOGY | Facility: CLINIC | Age: 60
End: 2020-04-17

## 2020-04-17 ENCOUNTER — TELEPHONE (OUTPATIENT)
Dept: INTERNAL MEDICINE | Facility: CLINIC | Age: 60
End: 2020-04-17

## 2020-04-17 ENCOUNTER — OUTPATIENT CASE MANAGEMENT (OUTPATIENT)
Dept: ADMINISTRATIVE | Facility: OTHER | Age: 60
End: 2020-04-17

## 2020-04-17 DIAGNOSIS — I73.9 PAD (PERIPHERAL ARTERY DISEASE): ICD-10-CM

## 2020-04-17 DIAGNOSIS — E78.5 HYPERLIPIDEMIA, UNSPECIFIED HYPERLIPIDEMIA TYPE: ICD-10-CM

## 2020-04-17 DIAGNOSIS — I10 ESSENTIAL HYPERTENSION: ICD-10-CM

## 2020-04-17 RX ORDER — SPIRONOLACTONE 25 MG/1
25 TABLET ORAL DAILY
Qty: 90 TABLET | Refills: 3 | Status: SHIPPED | OUTPATIENT
Start: 2020-04-17 | End: 2020-04-17 | Stop reason: DRUGHIGH

## 2020-04-17 RX ORDER — DOXAZOSIN 2 MG/1
2 TABLET ORAL NIGHTLY
Qty: 90 TABLET | Refills: 1 | Status: SHIPPED | OUTPATIENT
Start: 2020-04-17 | End: 2020-10-19 | Stop reason: SDUPTHER

## 2020-04-17 RX ORDER — CARVEDILOL 25 MG/1
25 TABLET ORAL 2 TIMES DAILY WITH MEALS
Qty: 180 TABLET | Refills: 3 | Status: SHIPPED | OUTPATIENT
Start: 2020-04-17 | End: 2020-06-04

## 2020-04-17 RX ORDER — CILOSTAZOL 100 MG/1
100 TABLET ORAL 2 TIMES DAILY
Qty: 180 TABLET | Refills: 3 | Status: SHIPPED | OUTPATIENT
Start: 2020-04-17 | End: 2020-10-27 | Stop reason: SDUPTHER

## 2020-04-17 RX ORDER — CLOPIDOGREL BISULFATE 75 MG/1
75 TABLET ORAL DAILY
Qty: 90 TABLET | Refills: 3 | Status: SHIPPED | OUTPATIENT
Start: 2020-04-17 | End: 2020-09-28 | Stop reason: SDUPTHER

## 2020-04-17 RX ORDER — SPIRONOLACTONE 50 MG/1
50 TABLET, FILM COATED ORAL DAILY
Qty: 90 TABLET | Refills: 1 | Status: SHIPPED | OUTPATIENT
Start: 2020-04-17 | End: 2020-09-23 | Stop reason: DRUGHIGH

## 2020-04-17 RX ORDER — VALSARTAN 320 MG/1
320 TABLET ORAL DAILY
Qty: 90 TABLET | Refills: 3 | Status: SHIPPED | OUTPATIENT
Start: 2020-04-17 | End: 2020-04-22 | Stop reason: SDUPTHER

## 2020-04-17 RX ORDER — ATORVASTATIN CALCIUM 80 MG/1
80 TABLET, FILM COATED ORAL NIGHTLY
Qty: 90 TABLET | Refills: 3 | Status: SHIPPED | OUTPATIENT
Start: 2020-04-17 | End: 2020-10-27 | Stop reason: SDUPTHER

## 2020-04-17 NOTE — TELEPHONE ENCOUNTER
----- Message from Alba Sotomayor LMSW sent at 4/17/2020 11:14 AM CDT -----  This SW received a referral on the above patient. [facility] provided patient/caregiver with the following resource: [facility] Financial Assistance Application, [facility] Patient Assistance Team, [facility] Resources (Financial Support, Housing Assistance, Counseling, etc). Patient has already completed Medicaid and unemployment applications.     Thank you for the referral,    Alba Sotomayor LMSW

## 2020-04-17 NOTE — PROGRESS NOTES
Outpatient Care Management   - Low Risk Patient Assessment    Patient: Domingo Gross  MRN:  [id number]  Date of Service:  4/17/2020  Completed by:  Alba Sotomayor LMSW  Referral Date: 04/09/2020  Program: [facility] Low Risk    Reason for Visit   Patient presents with    Social Work Assessment - Low/Mod Risk       Patient Summary     OPCM Social Work Assessment (Low/Moderate Risk)    General  Level of Caregiver support:  Member independent and does not need caregiver assistance  Have you had to make a decision between paying for any of the following in the last 2 months?:  Medication  Transportation means:  Self  Employment status:  Full time  Current symptoms:  None  Assessments  Was the PHQ Depression Screening completed this visit?:  Yes  Was the MIKE-7 Screening completed this visit?:  No     This LMSW received a referral on the above patient.   Reason for referral: low risk, Insurance coverage issues, financial difficulties  Name of the community resource that was provided: [facility] Financial Assistance Application, [facility] Patient Assistance Team, [facility] Resources (Financial Support, Housing Assistance, Counseling, etc)  Resource given to: Patient via Telephone and e-mail to: [email or web address]    LMSW completed SW assessment with patient. Patient reports living with his spouse and reports being independent with ADLs. Patient does not use assistive devices to ambulate. Patient reports he was laid off due to COVID-19 pandemic. Patient reports he will lose his insurance coverage at the end of this month if he does not resume work (Discussed [facility] Program). Patient reports taking several prescription medications due to chronic medical conditions. Patient reports is has not had difficulty affording food, housing or medications in last two months however, he is concerned family will eventually encounter issues paying for medications. Patient reports he has already  applied for Medicaid and unemployment benefits. SW asked patient if he is aware of GoodRx program and he responded yes. Patient informed about [facility] Assistance Program. He was provided information to contact assistance team if needs arise. Patient expressed interest in receiving community resources offered in [facility]. Patient agreed to having discussed resources mailed to provided e-mail address. No other needs identified by patient. Case closed and referral source notified.     Complex Care Plan     Care plan was discussed and completed today with input from patient and/or caregiver.    Goals      Overall Time to Completion  1 week from 04/17/2020    Social Work Identified Patient Barriers:     Financial: Yes      Short Term Goals  Patient/caregiver will verbalize knowledge of available resources prior to the end of this encounter.   Interventions:  · Discuss and provide community resources electronically and telephonically.   Status:  · Met              Patient Instructions     No follow-ups on file.    Todays OPCM Self-Management Care Plan was developed with the patients/caregivers input and was based on identified barriers from todays assessment.  Goals were written today with the patient/caregiver and the patient has agreed to work towards these goals to improve his/her overall well-being. Patient verbalized understanding of the care plan, goals, and all of today's instructions. Encouraged patient/caregiver to communicate with his/her physician and health care team about health conditions and the treatment plan.  Provided my contact information today and encouraged patient/caregiver to call me with any questions as needed.

## 2020-04-17 NOTE — PROGRESS NOTES
Digital Medicine: Clinician Follow-Up    Spoke with patient about higher BP, alert received.  We have room to increase his spirolactone, and also needed to add Cardura. BP reports he is sitting down while taking pressure and trying to relax.  Sodium in diet is not currently controlled so this is likely a factor.  He denies symptoms     The history is provided by the patient. No  was used.     Follow Up  Follow-up reason(s): reading review and medication change      Alert received.   Care Team received high BG alert.  Patient is not experiencing symptoms.  Medication Change: new med and dose increase        INTERVENTION(S)  reviewed appropriate dose schedule, reviewed monitoring technique and recommended med change    PLAN  patient verbalizes understanding    Increased K+ sparing diuretic   Added Cardura 2 mg nightly.  Labs scheduled for 4/20/2020.  Follow up on SW referral and medication cost as patient lost job and insurance coverage.   There are no preventive care reminders to display for this patient.    Last 5 Patient Entered Readings                                      Current 30 Day Average: 186/93     Recent Readings 4/17/2020 4/15/2020 4/14/2020 4/13/2020 4/8/2020    SBP (mmHg) 205 196 176 197 195    DBP (mmHg) 84 97 103 93 92    Pulse 76 74 77 78 79          Hypertension Medications             carvediloL (COREG) 25 MG tablet Take 1 tablet (25 mg total) by mouth 2 (two) times daily with meals.    doxazosin (CARDURA) 2 MG tablet Take 1 tablet (2 mg total) by mouth every evening.    spironolactone (ALDACTONE) 50 MG tablet Take 1 tablet (50 mg total) by mouth once daily.    valsartan (DIOVAN) 320 MG tablet Take 1 tablet (320 mg total) by mouth once daily.                 Screenings

## 2020-04-20 ENCOUNTER — PATIENT OUTREACH (OUTPATIENT)
Dept: OTHER | Facility: OTHER | Age: 60
End: 2020-04-20

## 2020-04-20 ENCOUNTER — LAB VISIT (OUTPATIENT)
Dept: LAB | Facility: HOSPITAL | Age: 60
End: 2020-04-20
Attending: INTERNAL MEDICINE
Payer: COMMERCIAL

## 2020-04-20 DIAGNOSIS — E87.6 HYPOKALEMIA: ICD-10-CM

## 2020-04-20 LAB
ANION GAP SERPL CALC-SCNC: 7 MMOL/L (ref 8–16)
BUN SERPL-MCNC: 19 MG/DL (ref 6–20)
CALCIUM SERPL-MCNC: 8 MG/DL (ref 8.7–10.5)
CHLORIDE SERPL-SCNC: 110 MMOL/L (ref 95–110)
CO2 SERPL-SCNC: 23 MMOL/L (ref 23–29)
CREAT SERPL-MCNC: 1.4 MG/DL (ref 0.5–1.4)
EST. GFR  (AFRICAN AMERICAN): >60 ML/MIN/1.73 M^2
EST. GFR  (NON AFRICAN AMERICAN): 55 ML/MIN/1.73 M^2
GLUCOSE SERPL-MCNC: 105 MG/DL (ref 70–110)
POTASSIUM SERPL-SCNC: 3.8 MMOL/L (ref 3.5–5.1)
SODIUM SERPL-SCNC: 140 MMOL/L (ref 136–145)

## 2020-04-20 PROCEDURE — 36415 COLL VENOUS BLD VENIPUNCTURE: CPT | Mod: PO

## 2020-04-20 PROCEDURE — 80048 BASIC METABOLIC PNL TOTAL CA: CPT

## 2020-04-20 NOTE — PROGRESS NOTES
Digital Medicine: Health  Follow-Up    I called Mr. Gross to follow up. He recently lost his job, and he is just trying to get through that currently. He manages his stress by joking and not letting it stress him out too much. He is currently working on a list of things to do around the house. He believes this new medicine is helping lower his blood pressure a little bit. He is not wanting to change anything lifestyle wise at this time.     The history is provided by the patient. No  was used.     Follow Up  Follow-up reason(s): reading review      Readings are trending down due to medication adherence.        INTERVENTION(S)  encouragement/support and denied questions    PLAN  continue monitoring      There are no preventive care reminders to display for this patient.    Last 5 Patient Entered Readings                                      Current 30 Day Average: 182/92     Recent Readings 4/20/2020 4/19/2020 4/18/2020 4/17/2020 4/15/2020    SBP (mmHg) 171 152 160 205 196    DBP (mmHg) 92 93 67 84 97    Pulse 70 70 72 76 74                      Diet Screening   He has the following dietary restrictions: low sodium diet    Barriers to a Healthy Diet: time/convenience    Deferred.     Physical Activity Screening       Deferred.     Medication Adherence Screening   He did not miss a dose this month.      SDOH

## 2020-04-22 ENCOUNTER — PATIENT MESSAGE (OUTPATIENT)
Dept: INTERNAL MEDICINE | Facility: CLINIC | Age: 60
End: 2020-04-22

## 2020-04-22 DIAGNOSIS — E87.6 HYPOKALEMIA: Primary | ICD-10-CM

## 2020-04-22 DIAGNOSIS — I10 ESSENTIAL HYPERTENSION: ICD-10-CM

## 2020-04-22 RX ORDER — VALSARTAN 320 MG/1
320 TABLET ORAL DAILY
Qty: 90 TABLET | Refills: 3 | Status: SHIPPED | OUTPATIENT
Start: 2020-04-22 | End: 2020-05-13 | Stop reason: ALTCHOICE

## 2020-05-01 ENCOUNTER — PATIENT OUTREACH (OUTPATIENT)
Dept: OTHER | Facility: OTHER | Age: 60
End: 2020-05-01

## 2020-05-01 NOTE — PROGRESS NOTES
Digital Medicine: Clinician Follow-Up    Called patient for digital medicine follow up. He is doing well since increasing spironolactone and adding doxazosin 2 mg nightly. Blood pressure is trending down and patient states that he is feeling great. Potassium was 3.8 mmol/L on repeat. Dr. Encarnacion discontinued supplement and has scheduled repeat in 2 weeks.     The history is provided by the patient. No  was used.     Follow Up  Follow-up reason(s): reading review and medication change follow-up      Readings are trending down   Patient started new medication.    Is patient tolerating med change?:  Yes          Assessment:  Patient's current 30-day average is not at goal of <130/80 mmHg but, trending down.        Plan:  Continue current regimen.  Patient reviewed with cardiology panel and consult to endocrinology suggested.  Will message Dr. Lim to discuss patient further.   Patients health , Crystal Nair, will follow-up as scheduled.    I will continue to monitor regularly and will follow-up in 2-3 weeks, sooner if blood pressure begins to trend upward or downward.       Patient has my contact information and knows to call with any concerns or clinical changes.            There are no preventive care reminders to display for this patient.    Last 5 Patient Entered Readings                                      Current 30 Day Average: 169/87     Recent Readings 5/1/2020 4/30/2020 4/28/2020 4/27/2020 4/25/2020    SBP (mmHg) 153 140 174 155 166    DBP (mmHg) 83 65 60 89 88    Pulse 68 74 65 72 71             Hypertension Medications             carvediloL (COREG) 25 MG tablet Take 1 tablet (25 mg total) by mouth 2 (two) times daily with meals.    doxazosin (CARDURA) 2 MG tablet Take 1 tablet (2 mg total) by mouth every evening.    spironolactone (ALDACTONE) 50 MG tablet Take 1 tablet (50 mg total) by mouth once daily.    valsartan (DIOVAN) 320 MG tablet Take 1 tablet (320 mg total) by  mouth once daily.                 Screenings

## 2020-05-05 ENCOUNTER — LAB VISIT (OUTPATIENT)
Dept: LAB | Facility: HOSPITAL | Age: 60
End: 2020-05-05
Attending: INTERNAL MEDICINE
Payer: MEDICAID

## 2020-05-05 DIAGNOSIS — E87.6 HYPOKALEMIA: ICD-10-CM

## 2020-05-05 LAB
ANION GAP SERPL CALC-SCNC: 6 MMOL/L (ref 8–16)
BUN SERPL-MCNC: 16 MG/DL (ref 6–20)
CALCIUM SERPL-MCNC: 7.9 MG/DL (ref 8.7–10.5)
CHLORIDE SERPL-SCNC: 108 MMOL/L (ref 95–110)
CO2 SERPL-SCNC: 24 MMOL/L (ref 23–29)
CREAT SERPL-MCNC: 1.4 MG/DL (ref 0.5–1.4)
EST. GFR  (AFRICAN AMERICAN): >60 ML/MIN/1.73 M^2
EST. GFR  (NON AFRICAN AMERICAN): 55 ML/MIN/1.73 M^2
GLUCOSE SERPL-MCNC: 132 MG/DL (ref 70–110)
POTASSIUM SERPL-SCNC: 3.5 MMOL/L (ref 3.5–5.1)
SODIUM SERPL-SCNC: 138 MMOL/L (ref 136–145)

## 2020-05-05 PROCEDURE — 36415 COLL VENOUS BLD VENIPUNCTURE: CPT | Mod: PO

## 2020-05-05 PROCEDURE — 80048 BASIC METABOLIC PNL TOTAL CA: CPT

## 2020-05-06 DIAGNOSIS — E87.6 HYPOKALEMIA: Primary | ICD-10-CM

## 2020-05-13 ENCOUNTER — TELEPHONE (OUTPATIENT)
Dept: CARDIOLOGY | Facility: CLINIC | Age: 60
End: 2020-05-13

## 2020-05-13 RX ORDER — IRBESARTAN 300 MG/1
300 TABLET ORAL NIGHTLY
Qty: 90 TABLET | Refills: 3 | Status: SHIPPED | OUTPATIENT
Start: 2020-05-13 | End: 2021-05-31

## 2020-05-13 NOTE — TELEPHONE ENCOUNTER
Spoke to Gross , and he stated he's already aware to  his new RX( Avapro 300 mg.).    Due to his local pharmacy already called him to Pick.        darby Wick (rita)

## 2020-05-13 NOTE — TELEPHONE ENCOUNTER
----- Message from Madina Wick MA sent at 5/12/2020  4:11 PM CDT -----   Dear Augustine BOLTON had already called this morning. I already forward you a message regarding Valsartan.    Jolynn   ----- Message -----  From: Virgil Nelson  Sent: 5/12/2020   3:14 PM CDT  To: Gregory KYLE Staff    Patient Domingo Gross mrn [id number] is on valsartan 320mg  med is on backorder and no release date. If possible please change medication to something else. Please send new Rx to [facility]. Thanks

## 2020-05-15 ENCOUNTER — TELEPHONE (OUTPATIENT)
Dept: CARDIOLOGY | Facility: HOSPITAL | Age: 60
End: 2020-05-15

## 2020-05-15 NOTE — TELEPHONE ENCOUNTER
----- Message from aMdina Wick MA sent at 5/12/2020  2:01 PM CDT -----  Dear HOANG,    What are we going to changed his medication too?.      Madina   ----- Message -----  From: Sasha Sierra  Sent: 5/12/2020   9:08 AM CDT  To: Gregory KYLE Staff    Carmelina Valsartan 320 is no longer available per the [facility], can we change medication to something else?

## 2020-05-19 ENCOUNTER — PATIENT OUTREACH (OUTPATIENT)
Dept: OTHER | Facility: OTHER | Age: 60
End: 2020-05-19

## 2020-05-20 ENCOUNTER — PATIENT OUTREACH (OUTPATIENT)
Dept: ADMINISTRATIVE | Facility: OTHER | Age: 60
End: 2020-05-20

## 2020-05-22 ENCOUNTER — OFFICE VISIT (OUTPATIENT)
Dept: OPHTHALMOLOGY | Facility: CLINIC | Age: 60
End: 2020-05-22
Payer: MEDICAID

## 2020-05-22 DIAGNOSIS — H25.13 NUCLEAR SCLEROSIS, BILATERAL: Primary | ICD-10-CM

## 2020-05-22 DIAGNOSIS — H26.9 CATARACT, UNSPECIFIED CATARACT TYPE, UNSPECIFIED LATERALITY: ICD-10-CM

## 2020-05-22 PROCEDURE — 92004 PR EYE EXAM, NEW PATIENT,COMPREHESV: ICD-10-PCS | Mod: S$PBB,,, | Performed by: OPHTHALMOLOGY

## 2020-05-22 PROCEDURE — 99999 PR PBB SHADOW E&M-EST. PATIENT-LVL III: ICD-10-PCS | Mod: PBBFAC,,, | Performed by: OPHTHALMOLOGY

## 2020-05-22 PROCEDURE — 99999 PR PBB SHADOW E&M-EST. PATIENT-LVL III: CPT | Mod: PBBFAC,,, | Performed by: OPHTHALMOLOGY

## 2020-05-22 PROCEDURE — 99213 OFFICE O/P EST LOW 20 MIN: CPT | Mod: PBBFAC | Performed by: OPHTHALMOLOGY

## 2020-05-22 PROCEDURE — 92004 COMPRE OPH EXAM NEW PT 1/>: CPT | Mod: S$PBB,,, | Performed by: OPHTHALMOLOGY

## 2020-05-22 PROCEDURE — 92136 IOL MASTER - MOD 26- OD- RIGHT EYE: ICD-10-PCS | Mod: 26,S$PBB,RT, | Performed by: OPHTHALMOLOGY

## 2020-05-22 PROCEDURE — 92136 OPHTHALMIC BIOMETRY: CPT | Mod: PBBFAC,RT | Performed by: OPHTHALMOLOGY

## 2020-05-22 RX ORDER — TETRACAINE HYDROCHLORIDE 5 MG/ML
1 SOLUTION OPHTHALMIC
Status: CANCELLED | OUTPATIENT
Start: 2020-05-22

## 2020-05-22 RX ORDER — PHENYLEPHRINE HYDROCHLORIDE 25 MG/ML
1 SOLUTION/ DROPS OPHTHALMIC
Status: CANCELLED | OUTPATIENT
Start: 2020-05-22

## 2020-05-22 RX ORDER — MOXIFLOXACIN 5 MG/ML
1 SOLUTION/ DROPS OPHTHALMIC
Status: CANCELLED | OUTPATIENT
Start: 2020-05-22

## 2020-05-22 RX ORDER — TROPICAMIDE 10 MG/ML
1 SOLUTION/ DROPS OPHTHALMIC
Status: CANCELLED | OUTPATIENT
Start: 2020-05-22

## 2020-05-22 RX ORDER — PREDNISOLONE ACETATE-GATIFLOXACIN-BROMFENAC .75; 5; 1 MG/ML; MG/ML; MG/ML
1 SUSPENSION/ DROPS OPHTHALMIC 3 TIMES DAILY
Qty: 5 ML | Refills: 3 | Status: SHIPPED | OUTPATIENT
Start: 2020-05-22 | End: 2020-08-07 | Stop reason: ALTCHOICE

## 2020-05-22 NOTE — H&P (VIEW-ONLY)
HPI     Decreased Visual Acuity      Additional comments: Right Eye >> Left Eye.               Comments     58 y/o male presents for cataract eval OD. Pt states he lost vision in OD   s/p Heart Attack 11/2019.   No pain or discomfort OU.     S/p Multiple Strabismus Surgeries as a child.           Last edited by Jennifer Muñoz on 5/22/2020 10:44 AM. (History)            Assessment /Plan     For exam results, see Encounter Report.    Nuclear sclerosis, bilateral    Cataract, unspecified cataract type, unspecified laterality  -     Ambulatory referral/consult to Ophthalmology      Visually Significant Cataract: Patient reports decreased vision consistent with the clinical amount of lenticular opacity, which reaches the level of visual significance and affects activities of daily living. Risks, benefits, and alternatives to cataract surgery were discussed and the consent reviewed. IOL options were discussed, including ATIOLs and the associated side effects and additional patient cost associated with them.   IOL Selections:   Right eye  IOL: pcboo 22.0     Left eye  IOL: pcboo 21.5    Pt wishes to have right eye done first.  Hx of ischemia from MI, but no obvious sequelae.   WHITE CAT, T Blue

## 2020-05-22 NOTE — PROGRESS NOTES
HPI     Decreased Visual Acuity      Additional comments: Right Eye >> Left Eye.               Comments     60 y/o male presents for cataract eval OD. Pt states he lost vision in OD   s/p Heart Attack 11/2019.   No pain or discomfort OU.     S/p Multiple Strabismus Surgeries as a child.           Last edited by Jennifer Muñoz on 5/22/2020 10:44 AM. (History)            Assessment /Plan     For exam results, see Encounter Report.    Nuclear sclerosis, bilateral    Cataract, unspecified cataract type, unspecified laterality  -     Ambulatory referral/consult to Ophthalmology      Visually Significant Cataract: Patient reports decreased vision consistent with the clinical amount of lenticular opacity, which reaches the level of visual significance and affects activities of daily living. Risks, benefits, and alternatives to cataract surgery were discussed and the consent reviewed. IOL options were discussed, including ATIOLs and the associated side effects and additional patient cost associated with them.   IOL Selections:   Right eye  IOL: pcboo 22.0     Left eye  IOL: pcboo 21.5    Pt wishes to have right eye done first.  Hx of ischemia from MI, but no obvious sequelae.   WHITE CAT, T Blue

## 2020-05-22 NOTE — LETTER
May 22, 2020      Keo Jenkins MD  [address]  [address]  [facility] [address]           [facility]  [address]  [place] [address]  Phone: [phone]  Fax: [phone]          Patient: Domingo Gross   MR Number: [id number]   YOB: 1960   Date of Visit: 5/22/2020       Dear Dr. Keo Jenkins:    Thank you for referring Domingo Gross to me for evaluation. Attached you will find relevant portions of my assessment and plan of care.    If you have questions, please do not hesitate to call me. I look forward to following Domingo Gross along with you.    Sincerely,    Tin Light MD    Enclosure  CC:  No Recipients    If you would like to receive this communication electronically, please contact [email or web address] or [phone] to request more information on [employer] Link access.    For providers and/or their staff who would like to refer a patient to [facility], please contact us through our one-stop-shop provider referral line, [facility], at [phone].    If you feel you have received this communication in error or would no longer like to receive these types of communications, please e-mail [email or web address]

## 2020-05-26 ENCOUNTER — TELEPHONE (OUTPATIENT)
Dept: OPHTHALMOLOGY | Facility: CLINIC | Age: 60
End: 2020-05-26

## 2020-05-26 DIAGNOSIS — H25.11 NUCLEAR SCLEROTIC CATARACT OF RIGHT EYE: Primary | ICD-10-CM

## 2020-06-04 ENCOUNTER — PATIENT OUTREACH (OUTPATIENT)
Dept: OTHER | Facility: OTHER | Age: 60
End: 2020-06-04

## 2020-06-04 ENCOUNTER — TELEPHONE (OUTPATIENT)
Dept: OPHTHALMOLOGY | Facility: CLINIC | Age: 60
End: 2020-06-04

## 2020-06-04 ENCOUNTER — TELEPHONE (OUTPATIENT)
Dept: INTERNAL MEDICINE | Facility: CLINIC | Age: 60
End: 2020-06-04

## 2020-06-04 DIAGNOSIS — I10 ESSENTIAL HYPERTENSION: ICD-10-CM

## 2020-06-04 RX ORDER — CARVEDILOL 25 MG/1
37.5 TABLET ORAL 2 TIMES DAILY WITH MEALS
Qty: 180 TABLET | Refills: 0
Start: 2020-06-04 | End: 2020-07-06

## 2020-06-04 NOTE — PROGRESS NOTES
Digital Medicine: Clinician Follow-Up    Called Mr. Domingo Gross for hypertension digital medicine follow-up. Patient reports adherence to medication regimen with no complaints. Blood pressure has improved mildly since adding doxazosin and increasing spironolactone however, readings still considerably above goal.         The history is provided by the patient. No  was used.   Follow Up  Follow-up reason(s): reading review, medication change and routine education      Medication Change: dose increase            Assessment:  Patient's current 30-day average is not at goal of <130/80 mmHg.       Plan:  Increase carvedilol to 37.5 mg twice daily. Continue other medications as prescribed.  Consider referral to endocrinology for secondary work up for HTN.   Avoid titration of spironolactone pending to discuss with cardiologist regarding referral to endocrinology.  Message sent to Dr. Jenkins and Dr. Encarnacion.   Patient's health , Crystal Nair, will follow-up as scheduled.    I will continue to monitor regularly and will follow-up in 2 weeks, sooner if blood pressure begins to trend upward or downward.     Patient has my contact information and knows to call with any concerns or clinical changes.            There are no preventive care reminders to display for this patient.    Last 5 Patient Entered Readings                                      Current 30 Day Average: 167/86     Recent Readings 6/4/2020 6/1/2020 5/25/2020 5/18/2020 5/17/2020    SBP (mmHg) 161 177 154 176 165    DBP (mmHg) 92 90 75 87 89    Pulse 67 70 70 71 68             Hypertension Medications             carvediloL (COREG) 25 MG tablet Take 1.5 tablets (37.5 mg total) by mouth 2 (two) times daily with meals.    doxazosin (CARDURA) 2 MG tablet Take 1 tablet (2 mg total) by mouth every evening.    irbesartan (AVAPRO) 300 MG tablet Take 1 tablet (300 mg total) by mouth every evening.    spironolactone (ALDACTONE) 50 MG  tablet Take 1 tablet (50 mg total) by mouth once daily.                 Screenings

## 2020-06-04 NOTE — TELEPHONE ENCOUNTER
----- Message from Marybeth Mooney PharmD sent at 6/4/2020  1:45 PM CDT -----  Regarding: Patient Referral   Hello Dr. Jenkins,    My name is Marybeth Mooney and I am currently monitoring Mr. Gross's blood pressure is our digital medicine program. His blood pressure has improved slightly since adding spironolactone but, he is still considerably above goal (167/86 mmHg). What are yours thoughts on referring him to endocrinology to be assessed secondary causes of hypertension? In reviewing his chart, he has had consistently low potassium levels until recently. Please advise.    Your time is greatly appreciated!    Marybeth Mooney PharmD  Digital Medicine Clinical Pharmacist   [phone]

## 2020-06-04 NOTE — TELEPHONE ENCOUNTER
Spoke with patient.Told them their surgery arrival time, which is 9am, on 6/8/20. Nothing to eat or drink after 9pm, the night before surgery. May have water, gatorade, or powerade after 9pm, until leaving home the morning of surgery. Start drops on Saturday, one drop TID into operative eye. 6/4/20 TR

## 2020-06-06 ENCOUNTER — LAB VISIT (OUTPATIENT)
Dept: INTERNAL MEDICINE | Facility: CLINIC | Age: 60
End: 2020-06-06
Payer: MEDICAID

## 2020-06-06 DIAGNOSIS — H25.13 NUCLEAR SCLEROSIS, BILATERAL: ICD-10-CM

## 2020-06-06 LAB — SARS-COV-2 RNA RESP QL NAA+PROBE: NOT DETECTED

## 2020-06-06 PROCEDURE — U0003 INFECTIOUS AGENT DETECTION BY NUCLEIC ACID (DNA OR RNA); SEVERE ACUTE RESPIRATORY SYNDROME CORONAVIRUS 2 (SARS-COV-2) (CORONAVIRUS DISEASE [COVID-19]), AMPLIFIED PROBE TECHNIQUE, MAKING USE OF HIGH THROUGHPUT TECHNOLOGIES AS DESCRIBED BY CMS-2020-01-R: HCPCS

## 2020-06-08 ENCOUNTER — ANESTHESIA (OUTPATIENT)
Dept: SURGERY | Facility: OTHER | Age: 60
End: 2020-06-08
Payer: MEDICAID

## 2020-06-08 ENCOUNTER — HOSPITAL ENCOUNTER (OUTPATIENT)
Facility: OTHER | Age: 60
Discharge: HOME | End: 2020-06-08
Attending: OPHTHALMOLOGY | Admitting: OPHTHALMOLOGY
Payer: MEDICAID

## 2020-06-08 ENCOUNTER — ANESTHESIA EVENT (OUTPATIENT)
Dept: SURGERY | Facility: OTHER | Age: 60
End: 2020-06-08
Payer: MEDICAID

## 2020-06-08 VITALS
HEART RATE: 70 BPM | WEIGHT: 195 LBS | TEMPERATURE: 97 F | OXYGEN SATURATION: 99 % | SYSTOLIC BLOOD PRESSURE: 158 MMHG | DIASTOLIC BLOOD PRESSURE: 92 MMHG | BODY MASS INDEX: 27.92 KG/M2 | HEIGHT: 70 IN | RESPIRATION RATE: 18 BRPM

## 2020-06-08 DIAGNOSIS — H25.13 NUCLEAR SCLEROSIS, BILATERAL: ICD-10-CM

## 2020-06-08 PROCEDURE — 66982 PR REMOVAL, CATARACT, W/INSRT INTRAOC LENS, W/O ENDO CYCLO, CMPLX: ICD-10-PCS | Mod: RT,,, | Performed by: OPHTHALMOLOGY

## 2020-06-08 PROCEDURE — 37000009 HC ANESTHESIA EA ADD 15 MINS: Performed by: OPHTHALMOLOGY

## 2020-06-08 PROCEDURE — 71000015 HC POSTOP RECOV 1ST HR: Performed by: OPHTHALMOLOGY

## 2020-06-08 PROCEDURE — 37000008 HC ANESTHESIA 1ST 15 MINUTES: Performed by: OPHTHALMOLOGY

## 2020-06-08 PROCEDURE — 00142 ANES PX ON EYE LENS SURGERY: CPT | Performed by: OPHTHALMOLOGY

## 2020-06-08 PROCEDURE — V2632 POST CHMBR INTRAOCULAR LENS: HCPCS | Performed by: OPHTHALMOLOGY

## 2020-06-08 PROCEDURE — 36000706: Performed by: OPHTHALMOLOGY

## 2020-06-08 PROCEDURE — 36000707: Performed by: OPHTHALMOLOGY

## 2020-06-08 PROCEDURE — 66982 XCAPSL CTRC RMVL CPLX WO ECP: CPT | Mod: RT,,, | Performed by: OPHTHALMOLOGY

## 2020-06-08 PROCEDURE — 25000003 PHARM REV CODE 250: Performed by: OPHTHALMOLOGY

## 2020-06-08 PROCEDURE — 63600175 PHARM REV CODE 636 W HCPCS: Performed by: NURSE ANESTHETIST, CERTIFIED REGISTERED

## 2020-06-08 DEVICE — LENS IOL ITEC PRELOAD 22.0D: Type: IMPLANTABLE DEVICE | Site: EYE | Status: FUNCTIONAL

## 2020-06-08 RX ORDER — TETRACAINE HYDROCHLORIDE 5 MG/ML
1 SOLUTION OPHTHALMIC
Status: COMPLETED | OUTPATIENT
Start: 2020-06-08 | End: 2020-06-08

## 2020-06-08 RX ORDER — MOXIFLOXACIN 5 MG/ML
1 SOLUTION/ DROPS OPHTHALMIC
Status: COMPLETED | OUTPATIENT
Start: 2020-06-08 | End: 2020-06-08

## 2020-06-08 RX ORDER — MIDAZOLAM HYDROCHLORIDE 1 MG/ML
INJECTION INTRAMUSCULAR; INTRAVENOUS
Status: DISCONTINUED | OUTPATIENT
Start: 2020-06-08 | End: 2020-06-08

## 2020-06-08 RX ORDER — PROPARACAINE HYDROCHLORIDE 5 MG/ML
1 SOLUTION/ DROPS OPHTHALMIC
Status: DISCONTINUED | OUTPATIENT
Start: 2020-06-08 | End: 2020-06-08 | Stop reason: HOSPADM

## 2020-06-08 RX ORDER — TROPICAMIDE 10 MG/ML
1 SOLUTION/ DROPS OPHTHALMIC
Status: COMPLETED | OUTPATIENT
Start: 2020-06-08 | End: 2020-06-08

## 2020-06-08 RX ORDER — MOXIFLOXACIN 5 MG/ML
SOLUTION/ DROPS OPHTHALMIC
Status: DISCONTINUED | OUTPATIENT
Start: 2020-06-08 | End: 2020-06-08 | Stop reason: HOSPADM

## 2020-06-08 RX ORDER — PHENYLEPHRINE HYDROCHLORIDE 25 MG/ML
1 SOLUTION/ DROPS OPHTHALMIC
Status: COMPLETED | OUTPATIENT
Start: 2020-06-08 | End: 2020-06-08

## 2020-06-08 RX ORDER — LIDOCAINE HYDROCHLORIDE 40 MG/ML
INJECTION, SOLUTION RETROBULBAR
Status: DISCONTINUED | OUTPATIENT
Start: 2020-06-08 | End: 2020-06-08 | Stop reason: HOSPADM

## 2020-06-08 RX ORDER — ACETAMINOPHEN 325 MG/1
650 TABLET ORAL EVERY 4 HOURS PRN
Status: DISCONTINUED | OUTPATIENT
Start: 2020-06-08 | End: 2020-06-08 | Stop reason: HOSPADM

## 2020-06-08 RX ORDER — TETRACAINE HYDROCHLORIDE 5 MG/ML
SOLUTION OPHTHALMIC
Status: DISCONTINUED | OUTPATIENT
Start: 2020-06-08 | End: 2020-06-08 | Stop reason: HOSPADM

## 2020-06-08 RX ADMIN — PHENYLEPHRINE HYDROCHLORIDE 1 DROP: 25 SOLUTION/ DROPS OPHTHALMIC at 08:06

## 2020-06-08 RX ADMIN — TROPICAMIDE 1 DROP: 10 SOLUTION/ DROPS OPHTHALMIC at 08:06

## 2020-06-08 RX ADMIN — MIDAZOLAM HYDROCHLORIDE 2 MG: 1 INJECTION, SOLUTION INTRAMUSCULAR; INTRAVENOUS at 10:06

## 2020-06-08 RX ADMIN — TETRACAINE HYDROCHLORIDE 1 DROP: 5 SOLUTION OPHTHALMIC at 09:06

## 2020-06-08 RX ADMIN — PHENYLEPHRINE HYDROCHLORIDE 1 DROP: 25 SOLUTION/ DROPS OPHTHALMIC at 09:06

## 2020-06-08 RX ADMIN — TROPICAMIDE 1 DROP: 10 SOLUTION/ DROPS OPHTHALMIC at 09:06

## 2020-06-08 RX ADMIN — MOXIFLOXACIN HYDROCHLORIDE 1 DROP: 5 SOLUTION/ DROPS OPHTHALMIC at 09:06

## 2020-06-08 RX ADMIN — TETRACAINE HYDROCHLORIDE 1 DROP: 5 SOLUTION OPHTHALMIC at 08:06

## 2020-06-08 RX ADMIN — MOXIFLOXACIN 1 DROP: 5 SOLUTION/ DROPS OPHTHALMIC at 10:06

## 2020-06-08 RX ADMIN — MOXIFLOXACIN HYDROCHLORIDE 1 DROP: 5 SOLUTION/ DROPS OPHTHALMIC at 08:06

## 2020-06-08 NOTE — ANESTHESIA POSTPROCEDURE EVALUATION
Anesthesia Post Evaluation    Patient: Domingo Gross    Procedure(s) Performed: Procedure(s) (LRB):  EXTRACTION, CATARACT, WITH IOL INSERTION (Right)    Final Anesthesia Type: MAC    Patient location during evaluation: [facility]  Patient participation: Yes- Able to Participate  Level of consciousness: awake and alert  Post-procedure vital signs: reviewed and stable  Pain management: adequate  Airway patency: patent    PONV status at discharge: No PONV  Anesthetic complications: no      Cardiovascular status: blood pressure returned to baseline  Respiratory status: unassisted and spontaneous ventilation  Hydration status: euvolemic  Follow-up not needed.          Vitals Value Taken Time   /92 6/8/2020  9:03 AM   Temp 36.3 °C (97.4 °F) 6/8/2020  9:03 AM   Pulse 70 6/8/2020  9:03 AM   Resp 18 6/8/2020  9:03 AM   SpO2 99 % 6/8/2020  9:03 AM         No case tracking events are documented in the log.      Pain/Edmundo Score: No data recorded

## 2020-06-08 NOTE — ANESTHESIA PREPROCEDURE EVALUATION
06/08/2020  Domingo Gross is a 59 y.o., male.    Anesthesia Evaluation    I have reviewed the Patient Summary Reports.    I have reviewed the Nursing Notes. I have reviewed the NPO Status.   I have reviewed the Medications.     Review of Systems  Anesthesia Hx:  Denies Family Hx of Anesthesia complications.   Denies Personal Hx of Anesthesia complications.   Social:  Former Smoker    Cardiovascular:   Exercise tolerance: good Hypertension Past MI CAD  CABG/stent  hyperlipidemia    Pulmonary:  Pulmonary Normal    Renal/:  Renal/ Normal     Hepatic/GI:  Hepatic/GI Normal    Musculoskeletal:   Arthritis     Neurological:  Neurology Normal    Endocrine:  Endocrine Normal        Physical Exam  General:  Well nourished    Airway/Jaw/Neck:  Airway Findings: Mouth Opening: Normal Tongue: Normal  General Airway Assessment: Adult  Mallampati: II  TM Distance: Normal, at least 6 cm      Dental:  Dental Findings: In tact   Chest/Lungs:  Chest/Lungs Clear    Heart/Vascular:  Heart Findings: Normal       Mental Status:  Mental Status Findings:  Alert and Oriented, Cooperative         Anesthesia Plan  Type of Anesthesia, risks & benefits discussed:  Anesthesia Type:  MAC  Patient's Preference:   Intra-op Monitoring Plan: standard ASA monitors  Intra-op Monitoring Plan Comments:   Post Op Pain Control Plan:   Post Op Pain Control Plan Comments:   Induction:    Beta Blocker:         Informed Consent: Patient understands risks and agrees with Anesthesia plan.  Questions answered. Anesthesia consent signed with patient.  ASA Score: 3     Day of Surgery Review of History & Physical:    H&P update referred to the surgeon.         Ready For Surgery From Anesthesia Perspective.

## 2020-06-08 NOTE — DISCHARGE SUMMARY
Outcome: Successful outpatient ophthalmic surgical procedure  Preprinted Instructions given to patient.  Regular diet.  Activity: No restrictions  Meds: see Med Rec  Condition: stable  Follow up: 1 day with Dr Light  Disposition: Home  Diagnosis: s/p eye surgery

## 2020-06-08 NOTE — OP NOTE
SURGEON:  Tin Light M.D.    PREOPERATIVE DIAGNOSIS:    Nuclear Sclerotic Cataract    POSTOPERATIVE DIAGNOSIS:    Nuclear Sclerotic Cataract    PROCEDURES:    Complex Phacoemulsification with  intraocular lens, right eye (90171)    DATE OF SURGERY: 06/08/2020    IMPLANT: pcboo 22.0    ANESTHESIA:  MAC with topical Lidocaine    COMPLICATIONS:  None    ESTIMATED BLOOD LOSS: None    SPECIMENS: None    INDICATIONS:    The patient has a history of painless progressive visual loss and  difficulty with activities of daily living secondary to cataract formation.  After a thorough discussion of the risks, benefits, and alternatives to cataract surgery, including, but not limited to, the rare risks of infection, retinal detachment, hemorrhage, need for additional surgery, loss of vision, and even loss of the eye, the patient voices understanding and desires to proceed.    DESCRIPTION OF PROCEDURE:    The patients IOL calculations were reviewed, and the lens selection confirmed.   After verification and marking of the proper eye in the preop holding area, the patient was brought to the operating room in supine position where the eye was prepped and draped in standard sterile fashion with 5% Betadine and a lid speculum placed in the eye.   Topical 4% Lidocaine was used in addition to the preoperative anesthesia and the procedure was begun by the creation of a paracentesis incision through which viscoelastic was used to fill the anterior chamber.  Next, a keratome blade was used to create a triplanar temporal clear corneal incision and a cystotome and Utrata forceps used to fashion a continuous curvilinear capsulorrhexis.  Hydrodissection was carried out using the Romeo hydrodissection cannula and the nucleus was found to be mobile.  Phacoemulsification of the nucleus was carried out using a quick chop technique, and all remaining epinuclear and cortical material was removed.  The eye was then reformed with Viscoelastic and  the  intraocular lens was implanted into the capsular bag.  All remaining viscoelastics were removed from the eye and at the end of the case the pupil was round, the lens was well-centered within the capsular bag and all wounds were found to be water tight.  Drops of Vigamox and Pred Forte were instilled and a shield was placed over the eye. The patient will follow up with Dr. Light in the morning.    ADDENDUM: Because the patient had a dense cataract and loss of red reflex, trypan blue was used to stain the anterior capsule.

## 2020-06-08 NOTE — PLAN OF CARE
Domingo Gross has met all discharge criteria from Phase II. Vital Signs are stable, ambulating  without difficulty. Discharge instructions given, patient verbalized understanding. Discharged from facility via wheelchair in stable condition.

## 2020-06-08 NOTE — DISCHARGE INSTRUCTIONS
Tin Light MD  [facility]  Department of Ophthalmology    AFTER: Cataract Surgery:  Relax at home and DO NOT exert yourself for the rest of the day.  Plan to see Dr. Light tomorrow at the eye clinic:   [facility] [address]  [place] [address]  Refer to attached eye drop instruction sheet   Precautions:  DO NOT rub your eye.  You may resume moderate activity the day after surgery.  Wear protective sunglasses during the day and a shield at night for 1(one) week.  If you have pain, redness and decreased vision, call Dr. Light (or the on-call doctor after hours) @[phone].    Home Care Instructions for Eye Surgeries  1. ACTIVITY:  Limit your activity today. Relax at home and DO NOT exert yourself for the rest of the day. Increase activity gradually. You may return to work or school as directed by your physician.    2. DIET:  Drink plenty of fluids. Resume your normal diet unless instructed otherwise.    3. PAIN:  Expect a moderate amount of pain. If a prescription for pain is not sent home with you, you may take your commonly used pain reliever as directed. If this is not sufficient, call your physician. You may resume any other prescription medication unless otherwise directed by your physician.     Discuss any problem with your physician as soon as it arises. Do not Delay.    EMERGENCY- If you are unable to contact your physician, please go to the nearest Emergency Room.     Anesthesia: Monitored Anesthesia Care (MAC)  Anesthesia Safety  · Have an adult family member or friend drive you home after the procedure.  · For the first 24 hours after your surgery:  · Do not drive or use heavy equipment.  · Do not make important decisions or sign documents.  · Avoid alcohol.  · Have someone stay with you, if possible. They can watch for problems and help keep you safe.

## 2020-06-09 ENCOUNTER — OFFICE VISIT (OUTPATIENT)
Dept: OPHTHALMOLOGY | Facility: CLINIC | Age: 60
End: 2020-06-09
Attending: OPHTHALMOLOGY
Payer: MEDICAID

## 2020-06-09 DIAGNOSIS — Z98.890 POST-OPERATIVE STATE: Primary | ICD-10-CM

## 2020-06-09 PROCEDURE — 99999 PR PBB SHADOW E&M-EST. PATIENT-LVL II: CPT | Mod: PBBFAC,,, | Performed by: OPHTHALMOLOGY

## 2020-06-09 PROCEDURE — 99024 POSTOP FOLLOW-UP VISIT: CPT | Mod: ,,, | Performed by: OPHTHALMOLOGY

## 2020-06-09 PROCEDURE — 99999 PR PBB SHADOW E&M-EST. PATIENT-LVL II: ICD-10-PCS | Mod: PBBFAC,,, | Performed by: OPHTHALMOLOGY

## 2020-06-09 PROCEDURE — 99212 OFFICE O/P EST SF 10 MIN: CPT | Mod: PBBFAC | Performed by: OPHTHALMOLOGY

## 2020-06-09 PROCEDURE — 99024 PR POST-OP FOLLOW-UP VISIT: ICD-10-PCS | Mod: ,,, | Performed by: OPHTHALMOLOGY

## 2020-06-09 NOTE — PROGRESS NOTES
HPI     DLS: 5/22/20    S/p phaco w/IOL OD 6/8/20  S/p Multiple Strabismus Surgeries as a child.     PGB TID OD    Pt here for 1 day post op OD. Pt states doing well. Pt denies eye pain OD.      Last edited by Ramya Kimbrough MA on 6/9/2020  8:18 AM. (History)              Assessment /Plan     For exam results, see Encounter Report.    Post-operative state      Slit lamp exam:  L/L: nl  K: clear, wound sealed  AC: 1+ cell  Lens: IOL centered and stable    POD1 s/p Phaco/IOL  Appropriate precautions and post op medications reviewed.  Patient instructed to call or come in if symptoms of redness, decreased vision, or pain are experienced.

## 2020-06-16 ENCOUNTER — OFFICE VISIT (OUTPATIENT)
Dept: OPHTHALMOLOGY | Facility: CLINIC | Age: 60
End: 2020-06-16
Attending: OPHTHALMOLOGY
Payer: MEDICAID

## 2020-06-16 DIAGNOSIS — Z98.890 POST-OPERATIVE STATE: Primary | ICD-10-CM

## 2020-06-16 DIAGNOSIS — H25.11 NUCLEAR SCLEROSIS, RIGHT: ICD-10-CM

## 2020-06-16 PROCEDURE — 99024 PR POST-OP FOLLOW-UP VISIT: ICD-10-PCS | Mod: ,,, | Performed by: OPHTHALMOLOGY

## 2020-06-16 PROCEDURE — 92136 OPHTHALMIC BIOMETRY: CPT | Mod: PBBFAC,LT | Performed by: OPHTHALMOLOGY

## 2020-06-16 PROCEDURE — 99024 POSTOP FOLLOW-UP VISIT: CPT | Mod: ,,, | Performed by: OPHTHALMOLOGY

## 2020-06-16 PROCEDURE — 92136 IOL MASTER - MOD 26 - OS - LEFT EYE: ICD-10-PCS | Mod: 26,S$PBB,LT, | Performed by: OPHTHALMOLOGY

## 2020-06-16 PROCEDURE — 99999 PR PBB SHADOW E&M-EST. PATIENT-LVL III: ICD-10-PCS | Mod: PBBFAC,,, | Performed by: OPHTHALMOLOGY

## 2020-06-16 PROCEDURE — 99999 PR PBB SHADOW E&M-EST. PATIENT-LVL III: CPT | Mod: PBBFAC,,, | Performed by: OPHTHALMOLOGY

## 2020-06-16 PROCEDURE — 99213 OFFICE O/P EST LOW 20 MIN: CPT | Mod: PBBFAC | Performed by: OPHTHALMOLOGY

## 2020-06-16 RX ORDER — TROPICAMIDE 10 MG/ML
1 SOLUTION/ DROPS OPHTHALMIC
Status: CANCELLED | OUTPATIENT
Start: 2020-06-16

## 2020-06-16 RX ORDER — PHENYLEPHRINE HYDROCHLORIDE 25 MG/ML
1 SOLUTION/ DROPS OPHTHALMIC
Status: CANCELLED | OUTPATIENT
Start: 2020-06-16

## 2020-06-16 RX ORDER — MOXIFLOXACIN 5 MG/ML
1 SOLUTION/ DROPS OPHTHALMIC
Status: CANCELLED | OUTPATIENT
Start: 2020-06-16

## 2020-06-16 RX ORDER — SODIUM CHLORIDE 0.9 % (FLUSH) 0.9 %
10 SYRINGE (ML) INJECTION
Status: DISCONTINUED | OUTPATIENT
Start: 2020-06-16 | End: 2021-07-17 | Stop reason: HOSPADM

## 2020-06-16 RX ORDER — TETRACAINE HYDROCHLORIDE 5 MG/ML
1 SOLUTION OPHTHALMIC
Status: CANCELLED | OUTPATIENT
Start: 2020-06-16

## 2020-06-16 NOTE — PROGRESS NOTES
Slit lamp exam:  L/L: nl  K: clear, wound sealed  AC: trace cell  Iris/Lens: IOL centered and stable    POW1 s/p phaco: Surgery healing well with no signs of infection or abnormal inflammation.    Patient wishes to proceed with surgery in the second eye. Risks, benefits, alternatives reviewed. IOL selection reviewed.     Left eye  IOL: pcboo 21.5

## 2020-06-16 NOTE — PROGRESS NOTES
Digital Medicine: Health  Follow-Up    I called patient to follow up. Mr pittman is doing well. He is tolerating the new medicine change. No changes to diet. he is drinking Gatorade. We talked about how switching to gatorade zero would have less sodium. He will consider it. For exercise, he is active in the yard. I encouraged him to charge his blood pressure cuff. No questions.          Intervention/Plan    There are no preventive care reminders to display for this patient.    Last 5 Patient Entered Readings                                      Current 30 Day Average: 165/85     Recent Readings 6/9/2020 6/4/2020 6/1/2020 5/25/2020 5/18/2020    SBP (mmHg) 157 161 177 154 176    DBP (mmHg) 77 92 90 75 87    Pulse 73 67 70 70 71                  Screenings    SDOH

## 2020-06-17 ENCOUNTER — PATIENT OUTREACH (OUTPATIENT)
Dept: OTHER | Facility: OTHER | Age: 60
End: 2020-06-17

## 2020-06-17 DIAGNOSIS — I10 ESSENTIAL HYPERTENSION: Primary | ICD-10-CM

## 2020-06-19 ENCOUNTER — NURSE TRIAGE (OUTPATIENT)
Dept: ADMINISTRATIVE | Facility: CLINIC | Age: 60
End: 2020-06-19

## 2020-06-19 NOTE — TELEPHONE ENCOUNTER
Spoke with patient on behalf of Post Procedural   Symptom tracker and he denies cough, fever, or difficulty breathing since his procedure.     Reason for Disposition   Health Information question, no triage required and triager able to answer question    Additional Information   Negative: [1] Caller is not with the adult (patient) AND [2] reporting urgent symptoms   Negative: Lab result questions   Negative: Medication questions   Negative: Caller can't be reached by phone   Negative: Caller has already spoken to PCP or another triager   Negative: RN needs further essential information from caller in order to complete triage   Negative: Requesting regular office appointment   Negative: [1] Caller requesting NON-URGENT health information AND [2] PCP's office is the best resource    Protocols used: INFORMATION ONLY CALL-A-AH

## 2020-06-22 ENCOUNTER — TELEPHONE (OUTPATIENT)
Dept: OPHTHALMOLOGY | Facility: CLINIC | Age: 60
End: 2020-06-22

## 2020-06-22 DIAGNOSIS — H25.12 NUCLEAR SCLEROTIC CATARACT OF LEFT EYE: Primary | ICD-10-CM

## 2020-06-30 ENCOUNTER — TELEPHONE (OUTPATIENT)
Dept: OPHTHALMOLOGY | Facility: CLINIC | Age: 60
End: 2020-06-30

## 2020-06-30 ENCOUNTER — LAB VISIT (OUTPATIENT)
Dept: PRIMARY CARE CLINIC | Facility: CLINIC | Age: 60
End: 2020-06-30
Payer: MEDICAID

## 2020-06-30 PROCEDURE — U0003 INFECTIOUS AGENT DETECTION BY NUCLEIC ACID (DNA OR RNA); SEVERE ACUTE RESPIRATORY SYNDROME CORONAVIRUS 2 (SARS-COV-2) (CORONAVIRUS DISEASE [COVID-19]), AMPLIFIED PROBE TECHNIQUE, MAKING USE OF HIGH THROUGHPUT TECHNOLOGIES AS DESCRIBED BY CMS-2020-01-R: HCPCS

## 2020-06-30 NOTE — TELEPHONE ENCOUNTER
CORRECTION! Spoke with patient .Told them their surgery arrival time, which is 830am, on 7/2/20. Nothing to eat or drink after 9pm, the night before surgery. May have water, gatorade, or powerade after 9pm, until leaving home the morning of surgery. Start drops on Tuesday, one drop TID into operative eye. 6/30/20 TR

## 2020-06-30 NOTE — TELEPHONE ENCOUNTER
Spoke with patient .Told them their surgery arrival time, which is 830am, on 7/6//20. Nothing to eat or drink after 9pm, the night before surgery. May have water, gatorade, or powerade after 9pm, until leaving home the morning of surgery. Start drops on Tuesday, one drop TID into operative eye. 6/30/20 TR

## 2020-07-01 NOTE — PROGRESS NOTES
Digital Medicine: Clinician Follow-Up    Called patient fir digital medicine follow up. Mr. Gross did not start higher dose of carvedilol as discussed at last follow up. Patient states that he forgot. Plans to initiate new regimen tomorrow after his procedure.     The history is provided by the patient. No  was used.   Follow Up  Follow-up reason(s): reading review and medication change follow-up      Patient did not start new medication.    Adherence Barriers: patient forgets            Assessment:  Patient's current 30-day average is not at goal of <130/80 mmHg.       Plan:  Patient advised to begin higher dose of carvedilol.  Continue other medications as prescribed.   Patients health , Crystal Nair, will follow-up as scheduled.    I will continue to monitor regularly and will follow-up in 2 weeks, sooner if blood pressure begins to trend upward or downward.      Patient has my contact information and knows to call with any concerns or clinical changes.            There are no preventive care reminders to display for this patient.    Last 5 Patient Entered Readings                                      Current 30 Day Average: 163/82     Recent Readings 6/30/2020 6/28/2020 6/27/2020 6/26/2020 6/25/2020    SBP (mmHg) 167 166 168 178 151    DBP (mmHg) 84 92 81 88 93    Pulse 67 69 73 71 68             Hypertension Medications             carvediloL (COREG) 25 MG tablet Take 1.5 tablets (37.5 mg total) by mouth 2 (two) times daily with meals.    doxazosin (CARDURA) 2 MG tablet Take 1 tablet (2 mg total) by mouth every evening.    irbesartan (AVAPRO) 300 MG tablet Take 1 tablet (300 mg total) by mouth every evening.    spironolactone (ALDACTONE) 50 MG tablet Take 1 tablet (50 mg total) by mouth once daily.                             Medication Adherence Screening       Patient identified the following reasons for non-compliance: Patient forgets    Intervention(s): Provided patient  education   Sent reminder via MyChart as requested by patient.

## 2020-07-02 ENCOUNTER — ANESTHESIA (OUTPATIENT)
Dept: SURGERY | Facility: OTHER | Age: 60
End: 2020-07-02
Payer: MEDICAID

## 2020-07-02 ENCOUNTER — ANESTHESIA EVENT (OUTPATIENT)
Dept: SURGERY | Facility: OTHER | Age: 60
End: 2020-07-02
Payer: MEDICAID

## 2020-07-02 ENCOUNTER — HOSPITAL ENCOUNTER (OUTPATIENT)
Facility: OTHER | Age: 60
Discharge: HOME | End: 2020-07-02
Attending: OPHTHALMOLOGY | Admitting: OPHTHALMOLOGY
Payer: MEDICAID

## 2020-07-02 VITALS
HEART RATE: 82 BPM | SYSTOLIC BLOOD PRESSURE: 152 MMHG | OXYGEN SATURATION: 99 % | DIASTOLIC BLOOD PRESSURE: 81 MMHG | WEIGHT: 195 LBS | RESPIRATION RATE: 18 BRPM | HEIGHT: 70 IN | TEMPERATURE: 98 F | BODY MASS INDEX: 27.92 KG/M2

## 2020-07-02 DIAGNOSIS — H25.12 NUCLEAR SCLEROTIC CATARACT OF LEFT EYE: Primary | ICD-10-CM

## 2020-07-02 DIAGNOSIS — Z98.890 POST-OPERATIVE STATE: ICD-10-CM

## 2020-07-02 LAB — SARS-COV-2 RDRP RESP QL NAA+PROBE: NEGATIVE

## 2020-07-02 PROCEDURE — 71000015 HC POSTOP RECOV 1ST HR: Performed by: OPHTHALMOLOGY

## 2020-07-02 PROCEDURE — V2632 POST CHMBR INTRAOCULAR LENS: HCPCS | Performed by: OPHTHALMOLOGY

## 2020-07-02 PROCEDURE — 36000707: Performed by: OPHTHALMOLOGY

## 2020-07-02 PROCEDURE — U0002 COVID-19 LAB TEST NON-CDC: HCPCS

## 2020-07-02 PROCEDURE — 63600175 PHARM REV CODE 636 W HCPCS: Performed by: NURSE ANESTHETIST, CERTIFIED REGISTERED

## 2020-07-02 PROCEDURE — 37000008 HC ANESTHESIA 1ST 15 MINUTES: Performed by: OPHTHALMOLOGY

## 2020-07-02 PROCEDURE — 00142 ANES PX ON EYE LENS SURGERY: CPT | Performed by: OPHTHALMOLOGY

## 2020-07-02 PROCEDURE — 37000009 HC ANESTHESIA EA ADD 15 MINS: Performed by: OPHTHALMOLOGY

## 2020-07-02 PROCEDURE — 25000003 PHARM REV CODE 250: Performed by: OPHTHALMOLOGY

## 2020-07-02 PROCEDURE — 66984 PR REMOVAL, CATARACT, W/INSRT INTRAOC LENS, W/O ENDO CYCLO: ICD-10-PCS | Mod: 79,LT,, | Performed by: OPHTHALMOLOGY

## 2020-07-02 PROCEDURE — 36000706: Performed by: OPHTHALMOLOGY

## 2020-07-02 PROCEDURE — 66984 XCAPSL CTRC RMVL W/O ECP: CPT | Mod: 79,LT,, | Performed by: OPHTHALMOLOGY

## 2020-07-02 DEVICE — LENS IOL ITEC PRELOAD 21.5D: Type: IMPLANTABLE DEVICE | Site: EYE | Status: FUNCTIONAL

## 2020-07-02 RX ORDER — MOXIFLOXACIN 5 MG/ML
SOLUTION/ DROPS OPHTHALMIC
Status: DISCONTINUED | OUTPATIENT
Start: 2020-07-02 | End: 2020-07-02 | Stop reason: HOSPADM

## 2020-07-02 RX ORDER — MIDAZOLAM HYDROCHLORIDE 1 MG/ML
INJECTION INTRAMUSCULAR; INTRAVENOUS
Status: DISCONTINUED | OUTPATIENT
Start: 2020-07-02 | End: 2020-07-02

## 2020-07-02 RX ORDER — LIDOCAINE HYDROCHLORIDE 40 MG/ML
INJECTION, SOLUTION RETROBULBAR
Status: DISCONTINUED | OUTPATIENT
Start: 2020-07-02 | End: 2020-07-02 | Stop reason: HOSPADM

## 2020-07-02 RX ORDER — MOXIFLOXACIN 5 MG/ML
1 SOLUTION/ DROPS OPHTHALMIC
Status: COMPLETED | OUTPATIENT
Start: 2020-07-02 | End: 2020-07-02

## 2020-07-02 RX ORDER — ACETAMINOPHEN 325 MG/1
650 TABLET ORAL EVERY 4 HOURS PRN
Status: DISCONTINUED | OUTPATIENT
Start: 2020-07-02 | End: 2020-07-02 | Stop reason: HOSPADM

## 2020-07-02 RX ORDER — TROPICAMIDE 10 MG/ML
1 SOLUTION/ DROPS OPHTHALMIC
Status: COMPLETED | OUTPATIENT
Start: 2020-07-02 | End: 2020-07-02

## 2020-07-02 RX ORDER — TETRACAINE HYDROCHLORIDE 5 MG/ML
1 SOLUTION OPHTHALMIC
Status: COMPLETED | OUTPATIENT
Start: 2020-07-02 | End: 2020-07-02

## 2020-07-02 RX ORDER — PROPARACAINE HYDROCHLORIDE 5 MG/ML
1 SOLUTION/ DROPS OPHTHALMIC
Status: DISCONTINUED | OUTPATIENT
Start: 2020-07-02 | End: 2020-07-02 | Stop reason: HOSPADM

## 2020-07-02 RX ORDER — PHENYLEPHRINE HYDROCHLORIDE 25 MG/ML
1 SOLUTION/ DROPS OPHTHALMIC
Status: COMPLETED | OUTPATIENT
Start: 2020-07-02 | End: 2020-07-02

## 2020-07-02 RX ADMIN — MOXIFLOXACIN HYDROCHLORIDE 1 DROP: 5 SOLUTION/ DROPS OPHTHALMIC at 09:07

## 2020-07-02 RX ADMIN — PHENYLEPHRINE HYDROCHLORIDE 1 DROP: 25 SOLUTION/ DROPS OPHTHALMIC at 09:07

## 2020-07-02 RX ADMIN — TROPICAMIDE 1 DROP: 10 SOLUTION/ DROPS OPHTHALMIC at 10:07

## 2020-07-02 RX ADMIN — MOXIFLOXACIN 1 DROP: 5 SOLUTION/ DROPS OPHTHALMIC at 11:07

## 2020-07-02 RX ADMIN — PHENYLEPHRINE HYDROCHLORIDE 1 DROP: 25 SOLUTION/ DROPS OPHTHALMIC at 10:07

## 2020-07-02 RX ADMIN — MOXIFLOXACIN HYDROCHLORIDE 1 DROP: 5 SOLUTION/ DROPS OPHTHALMIC at 10:07

## 2020-07-02 RX ADMIN — TROPICAMIDE 1 DROP: 10 SOLUTION/ DROPS OPHTHALMIC at 09:07

## 2020-07-02 RX ADMIN — TETRACAINE HYDROCHLORIDE 1 DROP: 5 SOLUTION OPHTHALMIC at 10:07

## 2020-07-02 RX ADMIN — MIDAZOLAM HYDROCHLORIDE 2 MG: 1 INJECTION, SOLUTION INTRAMUSCULAR; INTRAVENOUS at 10:07

## 2020-07-02 RX ADMIN — TETRACAINE HYDROCHLORIDE 1 DROP: 5 SOLUTION OPHTHALMIC at 09:07

## 2020-07-02 NOTE — LETTER
July 2, 2020         [address]  [facility] [address]  Phone: [phone]  Fax: [phone]       Patient: Domingo Gross   YOB: 1960  Date of Visit: 07/02/2020    To Whom It May Concern:    Dax Gross  was at [facility] on 07/02/2020 accompanied by Karmen Gross.  If you have any questions or concerns, or if I can be of further assistance, please do not hesitate to contact me.    Sincerely,    Wen Mnedoza RN

## 2020-07-02 NOTE — OP NOTE
SURGEON:  Tin Light M.D.    PREOPERATIVE DIAGNOSIS:    Nuclear Sclerotic Cataract Left Eye    POSTOPERATIVE DIAGNOSIS:    Nuclear Sclerotic Cataract Left Eye    PROCEDURES:    Phacoemulsification with  intraocular lens, Left eye (99219)    DATE OF SURGERY: 07/02/2020    IMPLANT: pcboo 21.5    ANESTHESIA:  MAC with topical Lidocaine    COMPLICATIONS:  None    ESTIMATED BLOOD LOSS: None    SPECIMENS: None    INDICATIONS:    The patient has a history of painless progressive visual loss and  difficulty with activities of daily living secondary to cataract formation.  After a thorough discussion of the risks, benefits, and alternatives to cataract surgery, including, but not limited to, the rare risks of infection, retinal detachment, hemorrhage, need for additional surgery, loss of vision, and even loss of the eye, the patient voices understanding and desires to proceed.    DESCRIPTION OF PROCEDURE:    The patients IOL calculations were reviewed, and the lens selection confirmed.   After verification and marking of the proper eye in the preop holding area, the patient was brought to the operating room in supine position where the eye was prepped and draped in standard sterile fashion with 5% Betadine and a lid speculum placed in the eye.   Topical 4% Lidocaine was used in addition to the preoperative anesthesia and the procedure was begun by the creation of a paracentesis incision through which viscoelastic was used to fill the anterior chamber.  Next, a keratome blade was used to create a triplanar temporal clear corneal incision and a cystotome and Utrata forceps used to fashion a continuous curvilinear capsulorrhexis.  Hydrodissection was carried out using the Romeo hydrodissection cannula and the nucleus was found to be mobile.  Phacoemulsification of the nucleus was carried out using a quick chop technique, and all remaining epinuclear and cortical material was removed.  The eye was then reformed with  Viscoelastic and the  intraocular lens was implanted into the capsular bag.  All remaining viscoelastics were removed from the eye and at the end of the case the pupil was round, the lens was well-centered within the capsular bag and all wounds were found to be water tight.  Drops of Vigamox and Pred Forte were instilled and a shield was placed over the eye. The patient will follow up with Dr. Light in the morning.

## 2020-07-02 NOTE — DISCHARGE INSTRUCTIONS
Anesthesia: Monitored Anesthesia Care (MAC)  Anesthesia safety  Tips for anesthesia safety include the following:   · Follow all instructions you are given for how long not to eat or drink before your procedure.  · Be sure your healthcare provider knows what medicines you take, especially any anti-inflammatory medicine or blood thinners. This includes aspirin and any other over-the-counter medicines, herbs, and supplements.  · Have an adult family member or friend drive you home after the procedure.  · For the first 24 hours after your surgery:  ¨ Do not drive or use heavy equipment.  ¨ Do not make important decisions or sign documents.  ¨ Avoid alcohol.  ¨ Have someone stay with you, if possible. They can watch for problems and help keep you safe.  Date Last Reviewed: 12/1/2016 © 2000-2017 [employer] [address] [place] [address], [place] [address]. All rights reserved. This information is not intended as a substitute for professional medical care. Always follow your healthcare professional's instructions.

## 2020-07-02 NOTE — ANESTHESIA PREPROCEDURE EVALUATION
07/02/2020  Domingo Gross is a 60 y.o., male.    Anesthesia Evaluation    I have reviewed the Patient Summary Reports.    I have reviewed the Nursing Notes. I have reviewed the NPO Status.   I have reviewed the Medications.     Review of Systems  Anesthesia Hx:  History of prior surgery of interest to airway management or planning: Previous anesthesia: MAC  6/8/20 phaco, rec'd versed 2 mg, did well with MAC.  Denies Family Hx of Anesthesia complications.   Denies Personal Hx of Anesthesia complications.   Social:  Former Smoker    Cardiovascular:   Exercise tolerance: good Hypertension Past MI CAD  CABG/stent  hyperlipidemia    Pulmonary:  Pulmonary Normal    Renal/:  Renal/ Normal     Hepatic/GI:  Hepatic/GI Normal    Musculoskeletal:   Arthritis     Neurological:  Neurology Normal    Endocrine:  Endocrine Normal        Physical Exam  General:  Well nourished    Airway/Jaw/Neck:  Airway Findings: Mouth Opening: Normal Tongue: Normal  General Airway Assessment: Adult  Mallampati: II  TM Distance: Normal, at least 6 cm      Dental:  Dental Findings: In tact   Chest/Lungs:  Chest/Lungs Clear    Heart/Vascular:  Heart Findings: Normal       Mental Status:  Mental Status Findings:  Alert and Oriented, Cooperative         Anesthesia Plan  Type of Anesthesia, risks & benefits discussed:  Anesthesia Type:  MAC  Patient's Preference:   Intra-op Monitoring Plan: standard ASA monitors  Intra-op Monitoring Plan Comments:   Post Op Pain Control Plan:   Post Op Pain Control Plan Comments:   Induction:    Beta Blocker:         Informed Consent: Patient understands risks and agrees with Anesthesia plan.  Questions answered. Anesthesia consent signed with patient.  ASA Score: 3     Day of Surgery Review of History & Physical:    H&P update referred to the surgeon.         Ready For Surgery From Anesthesia  Perspective.

## 2020-07-02 NOTE — ANESTHESIA POSTPROCEDURE EVALUATION
Anesthesia Post Evaluation    Patient: Domingo Gross    Procedure(s) Performed: Procedure(s) (LRB):  EXTRACTION, CATARACT, WITH IOL INSERTION (Left)    Final Anesthesia Type: MAC    Patient location during evaluation: [facility]  Patient participation: Yes- Able to Participate  Level of consciousness: awake and alert  Post-procedure vital signs: reviewed and stable  Pain management: adequate  Airway patency: patent    PONV status at discharge: No PONV  Anesthetic complications: no      Cardiovascular status: blood pressure returned to baseline  Respiratory status: unassisted, room air and spontaneous ventilation  Hydration status: euvolemic  Follow-up not needed.          Vitals Value Taken Time   /86 07/02/20 0954   Temp 36.7 °C (98.1 °F) 07/02/20 0951   Pulse 73 07/02/20 0951   Resp 18 07/02/20 0951   SpO2 99 % 07/02/20 0951         No case tracking events are documented in the log.      Pain/Edmundo Score: No data recorded

## 2020-07-03 ENCOUNTER — OFFICE VISIT (OUTPATIENT)
Dept: OPHTHALMOLOGY | Facility: CLINIC | Age: 60
End: 2020-07-03
Attending: OPHTHALMOLOGY
Payer: MEDICAID

## 2020-07-03 DIAGNOSIS — Z98.890 POST-OPERATIVE STATE: Primary | ICD-10-CM

## 2020-07-03 DIAGNOSIS — H25.12 NUCLEAR SCLEROTIC CATARACT OF LEFT EYE: ICD-10-CM

## 2020-07-03 LAB — SARS-COV-2 RNA RESP QL NAA+PROBE: NOT DETECTED

## 2020-07-03 PROCEDURE — 99024 PR POST-OP FOLLOW-UP VISIT: ICD-10-PCS | Mod: ,,, | Performed by: OPHTHALMOLOGY

## 2020-07-03 PROCEDURE — 99212 OFFICE O/P EST SF 10 MIN: CPT | Mod: PBBFAC | Performed by: OPHTHALMOLOGY

## 2020-07-03 PROCEDURE — 99024 POSTOP FOLLOW-UP VISIT: CPT | Mod: ,,, | Performed by: OPHTHALMOLOGY

## 2020-07-03 PROCEDURE — 99999 PR PBB SHADOW E&M-EST. PATIENT-LVL II: CPT | Mod: PBBFAC,,, | Performed by: OPHTHALMOLOGY

## 2020-07-03 PROCEDURE — 99999 PR PBB SHADOW E&M-EST. PATIENT-LVL II: ICD-10-PCS | Mod: PBBFAC,,, | Performed by: OPHTHALMOLOGY

## 2020-07-03 NOTE — PROGRESS NOTES
HPI     Post-op Evaluation      Additional comments: 1 DAY CHECK.               Comments     POD 1 CE with IOL OS  POM 1 CE with IOL OD    Pt states doing well, no complaints.     PGB TID OS           Last edited by Jennifer Muñoz on 7/3/2020  8:52 AM. (History)            Assessment /Plan     For exam results, see Encounter Report.    Post-operative state    Nuclear sclerotic cataract of left eye      Slit lamp exam:  L/L: nl  K: clear, wound sealed  AC: 1+ cell  Lens: IOL centered and stable    POD1 s/p Phaco/IOL  Appropriate precautions and post op medications reviewed.  Patient instructed to call or come in if symptoms of redness, decreased vision, or pain are experienced.

## 2020-07-06 DIAGNOSIS — I10 ESSENTIAL HYPERTENSION: ICD-10-CM

## 2020-07-06 RX ORDER — CARVEDILOL 25 MG/1
37.5 TABLET ORAL 2 TIMES DAILY WITH MEALS
Qty: 270 TABLET | Refills: 0 | Status: SHIPPED | OUTPATIENT
Start: 2020-07-06 | End: 2020-10-22 | Stop reason: SDUPTHER

## 2020-07-07 ENCOUNTER — TELEPHONE (OUTPATIENT)
Dept: CARDIOLOGY | Facility: CLINIC | Age: 60
End: 2020-07-07

## 2020-07-07 NOTE — TELEPHONE ENCOUNTER
Reached out to pt and explained that dr Jenkins will not be able to see his spouse as a new pt and he will not be able to order any testing for her.    Provided him with the number to the [facility] clinic here in [place] as requested.    Verbalized understanding

## 2020-07-08 ENCOUNTER — LAB VISIT (OUTPATIENT)
Dept: LAB | Facility: HOSPITAL | Age: 60
End: 2020-07-08
Attending: INTERNAL MEDICINE
Payer: MEDICAID

## 2020-07-08 DIAGNOSIS — E87.6 HYPOKALEMIA: ICD-10-CM

## 2020-07-08 LAB
ANION GAP SERPL CALC-SCNC: 8 MMOL/L (ref 8–16)
BUN SERPL-MCNC: 16 MG/DL (ref 6–20)
CALCIUM SERPL-MCNC: 7.7 MG/DL (ref 8.7–10.5)
CHLORIDE SERPL-SCNC: 108 MMOL/L (ref 95–110)
CO2 SERPL-SCNC: 24 MMOL/L (ref 23–29)
CREAT SERPL-MCNC: 1.6 MG/DL (ref 0.5–1.4)
EST. GFR  (AFRICAN AMERICAN): 53.7 ML/MIN/1.73 M^2
EST. GFR  (NON AFRICAN AMERICAN): 46.5 ML/MIN/1.73 M^2
GLUCOSE SERPL-MCNC: 84 MG/DL (ref 70–110)
POTASSIUM SERPL-SCNC: 3.6 MMOL/L (ref 3.5–5.1)
SODIUM SERPL-SCNC: 140 MMOL/L (ref 136–145)

## 2020-07-08 PROCEDURE — 80048 BASIC METABOLIC PNL TOTAL CA: CPT

## 2020-07-08 PROCEDURE — 36415 COLL VENOUS BLD VENIPUNCTURE: CPT | Mod: PO

## 2020-07-12 DIAGNOSIS — E83.51 HYPOCALCEMIA: Primary | ICD-10-CM

## 2020-07-14 ENCOUNTER — LAB VISIT (OUTPATIENT)
Dept: LAB | Facility: HOSPITAL | Age: 60
End: 2020-07-14
Attending: INTERNAL MEDICINE
Payer: MEDICAID

## 2020-07-14 DIAGNOSIS — E83.51 HYPOCALCEMIA: ICD-10-CM

## 2020-07-14 LAB
25(OH)D3+25(OH)D2 SERPL-MCNC: 10 NG/ML (ref 30–96)
ALBUMIN SERPL BCP-MCNC: 2 G/DL (ref 3.5–5.2)
ALP SERPL-CCNC: 89 U/L (ref 55–135)
ALT SERPL W/O P-5'-P-CCNC: 9 U/L (ref 10–44)
ANION GAP SERPL CALC-SCNC: 7 MMOL/L (ref 8–16)
AST SERPL-CCNC: 16 U/L (ref 10–40)
BILIRUB SERPL-MCNC: 0.2 MG/DL (ref 0.1–1)
BUN SERPL-MCNC: 15 MG/DL (ref 6–20)
CA-I BLDV-SCNC: 1.31 MMOL/L (ref 1.06–1.42)
CALCIUM SERPL-MCNC: 8.6 MG/DL (ref 8.7–10.5)
CHLORIDE SERPL-SCNC: 109 MMOL/L (ref 95–110)
CO2 SERPL-SCNC: 25 MMOL/L (ref 23–29)
CREAT SERPL-MCNC: 1.4 MG/DL (ref 0.5–1.4)
EST. GFR  (AFRICAN AMERICAN): >60 ML/MIN/1.73 M^2
EST. GFR  (NON AFRICAN AMERICAN): 54.6 ML/MIN/1.73 M^2
GLUCOSE SERPL-MCNC: 96 MG/DL (ref 70–110)
MAGNESIUM SERPL-MCNC: 1.7 MG/DL (ref 1.6–2.6)
POTASSIUM SERPL-SCNC: 3.7 MMOL/L (ref 3.5–5.1)
PROT SERPL-MCNC: 5.8 G/DL (ref 6–8.4)
PTH-INTACT SERPL-MCNC: 71 PG/ML (ref 9–77)
SODIUM SERPL-SCNC: 141 MMOL/L (ref 136–145)

## 2020-07-14 PROCEDURE — 80053 COMPREHEN METABOLIC PANEL: CPT

## 2020-07-14 PROCEDURE — 36415 COLL VENOUS BLD VENIPUNCTURE: CPT | Mod: PO

## 2020-07-14 PROCEDURE — 82330 ASSAY OF CALCIUM: CPT

## 2020-07-14 PROCEDURE — 83970 ASSAY OF PARATHORMONE: CPT

## 2020-07-14 PROCEDURE — 83735 ASSAY OF MAGNESIUM: CPT

## 2020-07-14 PROCEDURE — 82306 VITAMIN D 25 HYDROXY: CPT

## 2020-07-16 ENCOUNTER — PATIENT OUTREACH (OUTPATIENT)
Dept: OTHER | Facility: OTHER | Age: 60
End: 2020-07-16

## 2020-07-16 NOTE — PROGRESS NOTES
Digital Medicine: Clinician Follow-Up    Called patient for digital medicine follow up. Tolerating increased dose of carvedilol with no complaints. Blood pressure on 7/14 was 122/70 mmHg and patient reports feeling fine. Referral to endocrinology authorized and patient scheduled to see Dr. Rasheed on 8/3.     The history is provided by the patient.   Follow-up reason(s): medication change follow-up.     Readings are trending down     Patient is not experiencing signs/symptoms of hypotension.    Patient did make medication change.    Is patient tolerating med change?: yes        Last 5 Patient Entered Readings                                      Current 30 Day Average: 156/78     Recent Readings 7/14/2020 7/11/2020 7/9/2020 7/8/2020 7/7/2020    SBP (mmHg) 122 154 169 141 143    DBP (mmHg) 70 71 90 64 64    Pulse 72 76 75 77 70             Screenings    ASSESSMENT(S)  Patients BP average is 156/78 mmHg, which is above goal. Patient's BP goal is less than or equal to 130/80 per 2017 ACC/AHA Hypertension Guidelines.   Uncontrolled overall but much improved.     PLAN  Additional monitoring needed:  Continue current therapy:  Await MD intervention: Will defer further medication adjustments until evaluation by endocrinologist.     Patient verbalizes understanding. Patient did not express questions or concerns and patient has contact information if needed.          There are no preventive care reminders to display for this patient.    Hypertension Medications             carvediloL (COREG) 25 MG tablet Take 1.5 tablets (37.5 mg total) by mouth 2 (two) times daily with meals.    doxazosin (CARDURA) 2 MG tablet Take 1 tablet (2 mg total) by mouth every evening.    irbesartan (AVAPRO) 300 MG tablet Take 1 tablet (300 mg total) by mouth every evening.    spironolactone (ALDACTONE) 50 MG tablet Take 1 tablet (50 mg total) by mouth once daily.

## 2020-07-21 ENCOUNTER — PATIENT OUTREACH (OUTPATIENT)
Dept: OTHER | Facility: OTHER | Age: 60
End: 2020-07-21

## 2020-07-21 NOTE — PROGRESS NOTES
Digital Medicine: Health  Follow-Up    The history is provided by the patient.             Reason for review: Blood pressure not at goal        Topics Covered on Call: physical activity, Diet and job search     Additional Follow-up details: I called Mr. Gross to follow up. Feeling well. He's been looking for another job, but notes no one is hiring.     He isn't sure what caused his 122/70 reading on 7/14. He was just sitting at the kitchen table.             Diet-Change    Patient reports eating or drinking the following: Drinks 3 Dr. Pepper's per day. Reluctant to cut back. He rather cut a meal out than reduce soft drinks.   Dietary Improvements:He is currently focusing on eating less meals per day and reducing portion sizes.     He hopes to lose 15 pounds.     Dietary Indiscretions:        Intervention(s): carb reduction    Additional diet details:    Physical Activity-Change      He exercises for 30 minutes per day 3 day(s) a week.     He added biking to His physical activity routine.        Additional physical activity details: He's biking every other day for 30 minutes. I commended him on this.       Medication Adherence-Medication adherence was assessed.      Substance, Sleep, Stress-Not assessed    Patient verbalizes understanding. Patient did not express questions or concerns and patient has contact information if needed.    Explained the importance of self-monitoring and medication adherence. Encouraged the patient to communicate with their health  for lifestyle modifications to help improve or maintain a healthy lifestyle.        There are no preventive care reminders to display for this patient.    Last 5 Patient Entered Readings                                      Current 30 Day Average: 154/77     Recent Readings 7/20/2020 7/18/2020 7/17/2020 7/14/2020 7/11/2020    SBP (mmHg) 143 163 156 122 154    DBP (mmHg) 70 70 90 70 71    Pulse 69 69 66 72 76

## 2020-07-23 DIAGNOSIS — E55.9 VITAMIN D DEFICIENCY: Primary | ICD-10-CM

## 2020-07-23 RX ORDER — ASPIRIN 325 MG
50000 TABLET, DELAYED RELEASE (ENTERIC COATED) ORAL WEEKLY
Qty: 12 CAPSULE | Refills: 0 | Status: SHIPPED | OUTPATIENT
Start: 2020-07-23 | End: 2020-10-30 | Stop reason: SDUPTHER

## 2020-07-30 ENCOUNTER — PATIENT OUTREACH (OUTPATIENT)
Dept: OTHER | Facility: OTHER | Age: 60
End: 2020-07-30

## 2020-07-30 NOTE — PROGRESS NOTES
Digital Medicine: Clinician Follow-Up    Called patient in response to message regarding quantity for carvedilol. Patient states that they are not giving him enough tablets to take 1.5 twice daily. Spoke with pharmacy a few weeks ago and patient was asked to bring in tablets so that they could close out the old prescription and fill the new dose however, patient was not able to do this. Asked patient to contact dispensing pharmacy and ask them to fill the new prescription on file.     The history is provided by the patient.   Follow-up reason(s): responding to patient message.     Patient is not experiencing signs/symptoms of hypotension.  Patient is not experiencing signs/symptoms of hypertension.        Last 5 Patient Entered Readings                                      Current 30 Day Average: 150/75     Recent Readings 7/29/2020 7/23/2020 7/20/2020 7/18/2020 7/17/2020    SBP (mmHg) 148 163 143 163 156    DBP (mmHg) 78 77 70 70 90    Pulse 69 69 69 69 66             Screenings    PLAN  Continue current therapy:    Patient verbalizes understanding. Patient did not express questions or concerns and patient has contact information if needed.          There are no preventive care reminders to display for this patient.      Hypertension Medications             carvediloL (COREG) 25 MG tablet Take 1.5 tablets (37.5 mg total) by mouth 2 (two) times daily with meals.    doxazosin (CARDURA) 2 MG tablet Take 1 tablet (2 mg total) by mouth every evening.    irbesartan (AVAPRO) 300 MG tablet Take 1 tablet (300 mg total) by mouth every evening.    spironolactone (ALDACTONE) 50 MG tablet Take 1 tablet (50 mg total) by mouth once daily.

## 2020-07-31 ENCOUNTER — PATIENT OUTREACH (OUTPATIENT)
Dept: ADMINISTRATIVE | Facility: OTHER | Age: 60
End: 2020-07-31

## 2020-07-31 NOTE — PROGRESS NOTES
Chart reviewed.   Immunizations: Triggered Imm Registry     Orders placed: n/a  Upcoming appts to satisfy [facility] topics: n/a

## 2020-08-07 ENCOUNTER — OFFICE VISIT (OUTPATIENT)
Dept: OPTOMETRY | Facility: CLINIC | Age: 60
End: 2020-08-07
Payer: MEDICAID

## 2020-08-07 DIAGNOSIS — Z98.41 S/P BILATERAL CATARACT EXTRACTION: Primary | ICD-10-CM

## 2020-08-07 DIAGNOSIS — Z98.42 S/P BILATERAL CATARACT EXTRACTION: Primary | ICD-10-CM

## 2020-08-07 PROCEDURE — 99024 PR POST-OP FOLLOW-UP VISIT: ICD-10-PCS | Mod: ,,, | Performed by: OPTOMETRIST

## 2020-08-07 PROCEDURE — 99024 POSTOP FOLLOW-UP VISIT: CPT | Mod: ,,, | Performed by: OPTOMETRIST

## 2020-08-07 PROCEDURE — 92134 POSTERIOR SEGMENT OCT RETINA (OCULAR COHERENCE TOMOGRAPHY)-BOTH EYES: ICD-10-PCS | Mod: 26,S$PBB,, | Performed by: OPTOMETRIST

## 2020-08-07 PROCEDURE — 99999 PR PBB SHADOW E&M-EST. PATIENT-LVL II: CPT | Mod: PBBFAC,,, | Performed by: OPTOMETRIST

## 2020-08-07 PROCEDURE — 99999 PR PBB SHADOW E&M-EST. PATIENT-LVL II: ICD-10-PCS | Mod: PBBFAC,,, | Performed by: OPTOMETRIST

## 2020-08-07 PROCEDURE — 99212 OFFICE O/P EST SF 10 MIN: CPT | Mod: PBBFAC | Performed by: OPTOMETRIST

## 2020-08-07 PROCEDURE — 92134 CPTRZ OPH DX IMG PST SGM RTA: CPT | Mod: PBBFAC | Performed by: OPTOMETRIST

## 2020-08-07 NOTE — PROGRESS NOTES
HPI     Post-op Evaluation      Additional comments: 1 month phaco OS              Comments     Last eye exam was 7/3/20 with Dr. Light.  Patient denies diplopia, headaches, flashes/floaters, pain, and   itching/burning/tearing.    Pt does not use any eye drops.  Pt overall happy w va   Still using glasses to read    06/08/2020 IMPLANT: pcboo 22.0 OD  07/02/2020 IMPLANT: pcboo 21.5 OS          Last edited by Marline Rendon on 8/7/2020  8:21 AM. (History)            Assessment /Plan     For exam results, see Encounter Report.    S/P bilateral cataract extraction  -     OCT- Retina          1.  Pt doing well.  No rx given.  No CME OU  Retina flat and intact OU--no holes, tears, breaks, or RDs.  Educated pt to return to yearly eye exams.

## 2020-08-18 ENCOUNTER — PATIENT OUTREACH (OUTPATIENT)
Dept: OTHER | Facility: OTHER | Age: 60
End: 2020-08-18

## 2020-08-18 NOTE — PROGRESS NOTES
Digital Medicine: Health  Follow-Up    The history is provided by the patient.             Reason for review: Blood pressure not at goal      Additional Follow-up details:   I called Mr. Gross to follow up. He is doing well. He is applying to jobs.     oing well -  No changes  Staying hydrated - 32 oz ; cooking at home   Exercise: ride his bike - everyday - every morning - 45-60mins   Doing well on BP medicine           Diet-no change to diet    No change to diet.  Patient reports eating or drinking the following: Currently eating at home and drinking 32 oz of water per day. No interest in increasing at this time.       Physical Activity-Change      He exercises for 45 minutes per day 7 day(s) a week.     He added biking to His physical activity routine.        Additional physical activity details: He rides his bike 45-60 minutes every morning.       Medication Adherence-Medication adherence was assessed.          Continue current diet/physical activity routine.  Instructed to charge device.  Provided patient education.       Addressed any questions or concerns and patient has my contact information if needed prior to next outreach. Patient verbalizes understanding.          There are no preventive care reminders to display for this patient.    Last 5 Patient Entered Readings                                      Current 30 Day Average: 164/77     Recent Readings 8/17/2020 8/13/2020 8/4/2020 7/29/2020 7/23/2020    SBP (mmHg) 176 176 177 148 163    DBP (mmHg) 81 78 75 78 77    Pulse 77 65 81 69 69

## 2020-08-27 ENCOUNTER — TELEPHONE (OUTPATIENT)
Dept: CARDIOLOGY | Facility: CLINIC | Age: 60
End: 2020-08-27

## 2020-08-27 ENCOUNTER — PATIENT OUTREACH (OUTPATIENT)
Dept: OTHER | Facility: OTHER | Age: 60
End: 2020-08-27

## 2020-08-27 DIAGNOSIS — I10 ESSENTIAL HYPERTENSION: Primary | ICD-10-CM

## 2020-08-27 NOTE — TELEPHONE ENCOUNTER
----- Message from Marybeth Mooney PharmD sent at 8/27/2020 11:35 AM CDT -----  Regarding: Digital Medicine Management  Good morning Dr. Jenkins,    I am contacting you as I am in difficult position with respect to this patient's hypertension management. He was scheduled for follow up with nephrology in June as well as an initial consult with endocrinology on 8/3. Mr. Gross has cancelled both appointments with no pending appointment dates and his blood pressure remains very poorly controlled. I have reviewed his case with our cardiology panel and they agree that further work up is needed. I have contacted Mr. Gross to encourage him to reschedule both appointments as well as to discuss increasing spironolactone and adding diltiazem. What are your thoughts or recommendations on additional changes moving forward?      Your time is greatly appreciated.    Marybeth Mooney PharmD  Digital Medicine Clinical Pharmacist   [phone]

## 2020-09-17 ENCOUNTER — IMMUNIZATION (OUTPATIENT)
Dept: FAMILY MEDICINE | Facility: CLINIC | Age: 60
End: 2020-09-17
Payer: MEDICAID

## 2020-09-17 DIAGNOSIS — Z23 NEED FOR INFLUENZA VACCINATION: Primary | ICD-10-CM

## 2020-09-17 PROCEDURE — 99999 PR PBB SHADOW E&M-EST. PATIENT-LVL I: CPT | Mod: PBBFAC,,,

## 2020-09-17 PROCEDURE — 90471 IMMUNIZATION ADMIN: CPT | Mod: PBBFAC,PO

## 2020-09-17 PROCEDURE — 99211 OFF/OP EST MAY X REQ PHY/QHP: CPT | Mod: PBBFAC,PO,25

## 2020-09-17 PROCEDURE — 99999 PR PBB SHADOW E&M-EST. PATIENT-LVL I: ICD-10-PCS | Mod: PBBFAC,,,

## 2020-09-23 ENCOUNTER — TELEPHONE (OUTPATIENT)
Dept: NEPHROLOGY | Facility: CLINIC | Age: 60
End: 2020-09-23

## 2020-09-23 RX ORDER — SPIRONOLACTONE 100 MG/1
100 TABLET, FILM COATED ORAL DAILY
Qty: 30 TABLET | Refills: 5 | Status: SHIPPED | OUTPATIENT
Start: 2020-09-23 | End: 2020-10-19 | Stop reason: SDUPTHER

## 2020-09-23 NOTE — PROGRESS NOTES
Digital Medicine: Clinician Follow-Up    Called patient for follow up. States that he is doing well overall and back to work. Blood pressure continues to fluctuate, but most recent readings are improved. Patient attributes improved readings to medication adherence. Cancelled appointments to endocrinology and nephrology and has yet to reschedule. We discussed the importance of getting these appointments rescheduled and patient is amenable to suggestion.     The history is provided by the patient.      Review of patient's allergies indicates:   -- Ace inhibitors -- Rash      Last 5 Patient Entered Readings                                      Current 30 Day Average: 150/71     Recent Readings 9/23/2020 9/22/2020 9/16/2020 8/30/2020 8/29/2020    SBP (mmHg) 140 139 155 168 147    DBP (mmHg) 58 63 77 89 66    Pulse 85 79 71 69 65                 Depression Screening  Did not address depression screening.    Sleep Apnea Screening    Did not address sleep apnea screening.     Medication Affordability Screening  Did not address medication affordability screening.     Medication Adherence-Medication adherence was assessed.          ASSESSMENT(S)  Patients BP average is 150/71 mmHg, which is above goal. Patient's BP goal is less than or equal to 130/80 per 2017 ACC/AHA Hypertension Guidelines.     Hypertension Plan  Medication change. Increase spironolactone to 100 mg per consult with cardiology panel and Dr. Kang. Continue other medications as prescribed.   Labs ordered. BMP Expected Lab Date: 10/7/2020  Additional monitoring needed. Advised to check blood pressure daily.   Consider addition of diltiazem if blood pressure remains uncontrolled.  Message sent to Annalee Rasheed and [facility] staff to reschedule appointments.         There are no preventive care reminders to display for this patient.  There are no preventive care reminders to display for this patient.    Hypertension Medications             carvediloL (COREG) 25  MG tablet Take 1.5 tablets (37.5 mg total) by mouth 2 (two) times daily with meals.    doxazosin (CARDURA) 2 MG tablet Take 1 tablet (2 mg total) by mouth every evening.    irbesartan (AVAPRO) 300 MG tablet Take 1 tablet (300 mg total) by mouth every evening.    spironolactone (ALDACTONE) 50 MG tablet Take 1 tablet (50 mg total) by mouth once daily.

## 2020-09-24 ENCOUNTER — PATIENT MESSAGE (OUTPATIENT)
Dept: ADMINISTRATIVE | Facility: OTHER | Age: 60
End: 2020-09-24

## 2020-09-28 ENCOUNTER — PATIENT MESSAGE (OUTPATIENT)
Dept: MATERNAL FETAL MEDICINE | Facility: CLINIC | Age: 60
End: 2020-09-28

## 2020-09-29 ENCOUNTER — LAB VISIT (OUTPATIENT)
Dept: LAB | Facility: HOSPITAL | Age: 60
End: 2020-09-29
Attending: INTERNAL MEDICINE
Payer: MEDICAID

## 2020-09-29 DIAGNOSIS — N18.30 CKD (CHRONIC KIDNEY DISEASE), STAGE III: ICD-10-CM

## 2020-09-29 DIAGNOSIS — E87.6 HYPOKALEMIA: ICD-10-CM

## 2020-09-29 DIAGNOSIS — R80.9 PROTEINURIA, UNSPECIFIED TYPE: ICD-10-CM

## 2020-09-29 DIAGNOSIS — N18.30 CHRONIC KIDNEY DISEASE, STAGE 3: ICD-10-CM

## 2020-09-29 DIAGNOSIS — I12.9 HYPERTENSIVE KIDNEY DISEASE WITH STAGE 3 CHRONIC KIDNEY DISEASE: ICD-10-CM

## 2020-09-29 DIAGNOSIS — N18.30 HYPERTENSIVE KIDNEY DISEASE WITH STAGE 3 CHRONIC KIDNEY DISEASE: ICD-10-CM

## 2020-09-29 DIAGNOSIS — R80.9 NEPHROTIC RANGE PROTEINURIA: ICD-10-CM

## 2020-09-29 LAB
25(OH)D3+25(OH)D2 SERPL-MCNC: 27 NG/ML (ref 30–96)
ALBUMIN SERPL BCP-MCNC: 2.6 G/DL (ref 3.5–5.2)
ANION GAP SERPL CALC-SCNC: 7 MMOL/L (ref 8–16)
BASOPHILS # BLD AUTO: 0.04 K/UL (ref 0–0.2)
BASOPHILS NFR BLD: 0.8 % (ref 0–1.9)
BUN SERPL-MCNC: 21 MG/DL (ref 6–20)
C3 SERPL-MCNC: 128 MG/DL (ref 50–180)
C4 SERPL-MCNC: 25 MG/DL (ref 11–44)
CALCIUM SERPL-MCNC: 8.5 MG/DL (ref 8.7–10.5)
CHLORIDE SERPL-SCNC: 107 MMOL/L (ref 95–110)
CO2 SERPL-SCNC: 24 MMOL/L (ref 23–29)
CREAT SERPL-MCNC: 1.8 MG/DL (ref 0.5–1.4)
DIFFERENTIAL METHOD: ABNORMAL
EOSINOPHIL # BLD AUTO: 0.2 K/UL (ref 0–0.5)
EOSINOPHIL NFR BLD: 4.3 % (ref 0–8)
ERYTHROCYTE [DISTWIDTH] IN BLOOD BY AUTOMATED COUNT: 13.2 % (ref 11.5–14.5)
EST. GFR  (AFRICAN AMERICAN): 46.6 ML/MIN/1.73 M^2
EST. GFR  (NON AFRICAN AMERICAN): 40.3 ML/MIN/1.73 M^2
GLUCOSE SERPL-MCNC: 110 MG/DL (ref 70–110)
HCT VFR BLD AUTO: 26.2 % (ref 40–54)
HGB BLD-MCNC: 8.4 G/DL (ref 14–18)
IMM GRANULOCYTES # BLD AUTO: 0.01 K/UL (ref 0–0.04)
IMM GRANULOCYTES NFR BLD AUTO: 0.2 % (ref 0–0.5)
LYMPHOCYTES # BLD AUTO: 1.3 K/UL (ref 1–4.8)
LYMPHOCYTES NFR BLD: 23.5 % (ref 18–48)
MCH RBC QN AUTO: 27.4 PG (ref 27–31)
MCHC RBC AUTO-ENTMCNC: 32.1 G/DL (ref 32–36)
MCV RBC AUTO: 85 FL (ref 82–98)
MONOCYTES # BLD AUTO: 0.5 K/UL (ref 0.3–1)
MONOCYTES NFR BLD: 8.4 % (ref 4–15)
NEUTROPHILS # BLD AUTO: 3.4 K/UL (ref 1.8–7.7)
NEUTROPHILS NFR BLD: 62.8 % (ref 38–73)
NRBC BLD-RTO: 0 /100 WBC
PHOSPHATE SERPL-MCNC: 3.3 MG/DL (ref 2.7–4.5)
PLATELET # BLD AUTO: 202 K/UL (ref 150–350)
PMV BLD AUTO: 10.7 FL (ref 9.2–12.9)
POTASSIUM SERPL-SCNC: 3.8 MMOL/L (ref 3.5–5.1)
PTH-INTACT SERPL-MCNC: 73 PG/ML (ref 9–77)
RBC # BLD AUTO: 3.07 M/UL (ref 4.6–6.2)
SODIUM SERPL-SCNC: 138 MMOL/L (ref 136–145)
URATE SERPL-MCNC: 9.6 MG/DL (ref 3.4–7)
WBC # BLD AUTO: 5.33 K/UL (ref 3.9–12.7)

## 2020-09-29 PROCEDURE — 86160 COMPLEMENT ANTIGEN: CPT

## 2020-09-29 PROCEDURE — 87340 HEPATITIS B SURFACE AG IA: CPT

## 2020-09-29 PROCEDURE — 85025 COMPLETE CBC W/AUTO DIFF WBC: CPT

## 2020-09-29 PROCEDURE — 83970 ASSAY OF PARATHORMONE: CPT

## 2020-09-29 PROCEDURE — 80069 RENAL FUNCTION PANEL: CPT

## 2020-09-29 PROCEDURE — 86334 PATHOLOGIST INTERPRETATION IFE: ICD-10-PCS | Mod: 26,,, | Performed by: PATHOLOGY

## 2020-09-29 PROCEDURE — 84165 PROTEIN E-PHORESIS SERUM: CPT | Mod: 26,,, | Performed by: PATHOLOGY

## 2020-09-29 PROCEDURE — 86803 HEPATITIS C AB TEST: CPT

## 2020-09-29 PROCEDURE — 86160 COMPLEMENT ANTIGEN: CPT | Mod: 59

## 2020-09-29 PROCEDURE — 82306 VITAMIN D 25 HYDROXY: CPT

## 2020-09-29 PROCEDURE — 84550 ASSAY OF BLOOD/URIC ACID: CPT

## 2020-09-29 PROCEDURE — 84165 PROTEIN E-PHORESIS SERUM: CPT

## 2020-09-29 PROCEDURE — 36415 COLL VENOUS BLD VENIPUNCTURE: CPT | Mod: PO

## 2020-09-29 PROCEDURE — 84165 PATHOLOGIST INTERPRETATION SPE: ICD-10-PCS | Mod: 26,,, | Performed by: PATHOLOGY

## 2020-09-29 PROCEDURE — 86255 FLUORESCENT ANTIBODY SCREEN: CPT | Mod: 91

## 2020-09-29 PROCEDURE — 86334 IMMUNOFIX E-PHORESIS SERUM: CPT

## 2020-09-29 PROCEDURE — 86334 IMMUNOFIX E-PHORESIS SERUM: CPT | Mod: 26,,, | Performed by: PATHOLOGY

## 2020-09-30 ENCOUNTER — PATIENT OUTREACH (OUTPATIENT)
Dept: ADMINISTRATIVE | Facility: OTHER | Age: 60
End: 2020-09-30

## 2020-09-30 LAB
ALBUMIN SERPL ELPH-MCNC: 2.81 G/DL (ref 3.35–5.55)
ALPHA1 GLOB SERPL ELPH-MCNC: 0.35 G/DL (ref 0.17–0.41)
ALPHA2 GLOB SERPL ELPH-MCNC: 0.95 G/DL (ref 0.43–0.99)
B-GLOBULIN SERPL ELPH-MCNC: 0.72 G/DL (ref 0.5–1.1)
GAMMA GLOB SERPL ELPH-MCNC: 0.78 G/DL (ref 0.67–1.58)
HBV SURFACE AG SERPL QL IA: NEGATIVE
HCV AB SERPL QL IA: NEGATIVE
INTERPRETATION SERPL IFE-IMP: NORMAL
PATHOLOGIST INTERPRETATION IFE: NORMAL
PATHOLOGIST INTERPRETATION SPE: NORMAL
PROT SERPL-MCNC: 5.6 G/DL (ref 6–8.4)

## 2020-10-01 ENCOUNTER — OFFICE VISIT (OUTPATIENT)
Dept: NEPHROLOGY | Facility: CLINIC | Age: 60
End: 2020-10-01
Payer: MEDICAID

## 2020-10-01 DIAGNOSIS — R80.9 NEPHROTIC RANGE PROTEINURIA: ICD-10-CM

## 2020-10-01 DIAGNOSIS — N18.32 STAGE 3B CHRONIC KIDNEY DISEASE: Primary | ICD-10-CM

## 2020-10-01 DIAGNOSIS — I73.9 PAD (PERIPHERAL ARTERY DISEASE): ICD-10-CM

## 2020-10-01 DIAGNOSIS — E55.9 VITAMIN D DEFICIENCY: ICD-10-CM

## 2020-10-01 LAB
ANCA AB TITR SER IF: NORMAL TITER
P-ANCA TITR SER IF: NORMAL TITER

## 2020-10-01 PROCEDURE — 99214 PR OFFICE/OUTPT VISIT, EST, LEVL IV, 30-39 MIN: ICD-10-PCS | Mod: 95,,, | Performed by: INTERNAL MEDICINE

## 2020-10-01 PROCEDURE — 99214 OFFICE O/P EST MOD 30 MIN: CPT | Mod: 95,,, | Performed by: INTERNAL MEDICINE

## 2020-10-01 NOTE — PROGRESS NOTES
Subjective:   Patient ID: Domingo Gross is a 60 y.o. White male who presents for new evaluation of Chronic Kidney Disease and Proteinuria  pt was seen in [facility].       The patient location is: work place in [place]  The chief complaint leading to consultation is: CKD    Visit type: audiovisual    Face to Face time with patient: 12 minutes  25 minutes of total time spent on the encounter, which includes face to face time and non-face to face time preparing to see the patient (eg, review of tests), Obtaining and/or reviewing separately obtained history, Documenting clinical information in the electronic or other health record, Independently interpreting results (not separately reported) and communicating results to the patient/family/caregiver, or Care coordination (not separately reported).         Each patient to whom he or she provides medical services by telemedicine is:  (1) informed of the relationship between the physician and patient and the respective role of any other health care provider with respect to management of the patient; and (2) notified that he or she may decline to receive medical services by telemedicine and may withdraw from such care at any time.    Notes:     HPI   was seen in [facility] for new patient evaluation of CKD. Since this was his first visit with me I reviewed his prior pertinent chart. He was seen in new patient evaluation on 3/27/20. Since then he did not return for follow up until this visit for reason unclear.     Interim chart reviewed. His BP was uncontrolled around 2 months ago, lasted as uncontrolled HTN for several weeks/ months. His medications were adjusted by his cardiologist and Digital medicine.    Of note, per Digital medicine notes, pt had not sent BP readings since around end of August.     He was at work place during this visit. He indicated need to go back to work and keep this visit short in time. I informed him of his worsened kidney  function and especially worsened proteinuria to nephrotic range. I discussed with him that this may need evaluation by kidney biopsy however him being on antiplatelet agents especially in the background of episode of cardiac arrest from 2019, it becomes difficult to do the biopsy as it required holding aspirin or similar products for at least 5 days. I did mention higher risk of bleeding and avoiding any strenuous activity after the biopsy. He expressed his work may not allow him to take any time off and may become difficult to avoid heavy physical work. I reached out to his cardiologist, Dr. Schumacher who indicated that ok to hold Plavix pre procedure but not clear yet on risk of holding aspirin pre procedure for 5 days.    Given above yet his candidacy for kidney biopsy is unclear. He remains on Irbesartan based regimen. Due to poorly controlled hypertension, his spironolactone dose was increased by Digital medicine.     He denies any new symptoms. He denies any recent use of NSAID. He denies any decreased oral intake/ nausea/ vomiting/ decreased urine/ flank pain. He describes having swelling of both legs. His antihypertensive medications are being managed by his PCP and cardiologist. Due to hypokalemia, he has been placed on spironolactone. He has been on ARB. He denies any muscle weakness/ tingling.     He has hypertension, hyperlipidemia, CAD, s/p cardiac arrest in October 2019, s/p LHC that time, CKD, PVD, carotid artery disease, anemia, DJD lumbar spine,  and other medical problems. He has exposure to IV iodine contrast during LHC, previously during CTA and peripheral angiogram. He has extensive problems with PAD and has had multiple vascular interventions so far.     Serial labs noted for onset of CKD in 2018 with progression and episodes of BRETT in October 2019 around the time of his cardiac arrest. Since then his CKD has progressed.     He has moderate to severe anemia which is investigated by his PCP.  Limited urine studies show nephrotic proteinuria. Of note within [facility] system he did not have urine studies between 2013 and 2020. He could not report details of medical work up in the interim period. He has moderate hypoalbuminemia.     Renal Function:  Lab Results   Component Value Date     09/29/2020    GLU 96 07/14/2020     09/29/2020     07/14/2020    K 3.8 09/29/2020    K 3.7 07/14/2020     09/29/2020     07/14/2020    CO2 24 09/29/2020    CO2 25 07/14/2020    BUN 21 (H) 09/29/2020    BUN 15 07/14/2020    CALCIUM 8.5 (L) 09/29/2020    CALCIUM 8.6 (L) 07/14/2020    CREATININE 1.8 (H) 09/29/2020    CREATININE 1.4 07/14/2020    ALBUMIN 2.6 (L) 09/29/2020    ALBUMIN 2.0 (L) 07/14/2020    PHOS 3.3 09/29/2020    PHOS 2.8 01/31/2020    ESTGFRAFRICA 46.6 (A) 09/29/2020    ESTGFRAFRICA >60.0 07/14/2020    EGFRNONAA 40.3 (A) 09/29/2020    EGFRNONAA 54.6 (A) 07/14/2020       Urinalysis:  Lab Results   Component Value Date    APPEARANCEUA Clear 09/29/2020    PHUR 6.0 09/29/2020    SPECGRAV 1.015 09/29/2020    PROTEINUA 3+ (A) 09/29/2020    GLUCUA Negative 09/29/2020    OCCULTUA Negative 09/29/2020    NITRITE Negative 09/29/2020    LEUKOCYTESUR Negative 09/29/2020       Protein/Creatinine Ratio:  Lab Results   Component Value Date    PROTEINURINE 548 (H) 09/29/2020    CREATRANDUR 128.0 09/29/2020    UTPCR 4.28 (H) 09/29/2020       CBC:  Lab Results   Component Value Date    WBC 5.33 09/29/2020    HGB 8.4 (L) 09/29/2020    HCT 26.2 (L) 09/29/2020       PTH:  Lab Results   Component Value Date    PTH 73.0 09/29/2020     Paraprotein not detected   Vit D 27  Hep C/B screen negative  ANCA negative  C3 25  C4 128    US kidneys awaited at the time of this visit    Review of Systems   Constitutional: Negative for activity change, appetite change, chills, fatigue and fever.   HENT: Negative for facial swelling, hearing loss, sneezing and sore throat.    Eyes: Negative for discharge and visual  disturbance.   Respiratory: Negative for cough, shortness of breath and wheezing.    Cardiovascular: Positive for leg swelling. Negative for chest pain.   Gastrointestinal: Negative for abdominal pain, diarrhea, nausea and vomiting.   Endocrine: Negative for polydipsia and polyuria.   Genitourinary: Negative for dysuria, flank pain, frequency and hematuria.   Musculoskeletal: Negative for back pain and myalgias.   Skin: Negative for pallor and rash.   Allergic/Immunologic: Negative for environmental allergies.   Neurological: Negative for dizziness, light-headedness and headaches.   Psychiatric/Behavioral: Negative for behavioral problems. The patient is not nervous/anxious.        Objective:   Physical Exam   Virtual visit  Limited exam  Constitutional: He is oriented to person, place, and time. He appears well-developed. No distress.   Pulmonary/Chest: Effort normal. No respiratory distress.   Neurological: He is alert and oriented to person, place, and time.   Skin: He is not diaphoretic.   Psychiatric: He has a normal mood and affect.      Assessment:     1. Hypertensive CKD III  2. Nephrotic range proteinuria  3. Vit D deficiency  4. 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 PAD  6. CAD     Plan:     Mr. Gross has CKD IIIB with nephrotic proteinuria which is worsening, it is due to atherosclerotic vascular disease, hypertensive nephrosclerosis, prior multiple exposures to contrast leading to BRETT and progression of CKD. He has nephrotic range proteinuria. Onset of this is not exactly known as between 2013 and 2020 he did not have any urine studies. He could have underlying primary GN. Hypertensive glomerulosclerosis could be contributing but cannot explain the severity of proteinuria entirely.    Obtained serologic work up, non revealing so far  US kidneys to evaluate kidney size.   Currently with his need to use dual antiplatelet agents in the context of cardiac arrest in October 2019 when per cardiology note he  has been advised dual antiplatelet use for one year, it is difficult to offer him percutaneous kidney biopsy which will mandate holding these agents for at least 5 days prior to the procedure. Patient was explained about this in detail. He expressed he was not interested in doing kidney biopsy currently but agreed to have work up as detailed otherwise. He is concerned about how this procedure could potentially impact his work and is not much interested in doing it. Per his cardiologist now ok to hold Plavix pre procedure but not clear yet if aspirin can be held pre procedure for 5 days. Yet his candidacy for IR guided kidney biopsy appears to be unclear.    I had a detailed discussion with patient about his CKD diagnosis, CKD staging, interpretation of serial labs pertaining to his kidneys, potential risk of progression of CKD. Possible etiology of his CKD, need for improved BP control, strict low salt diet, avoiding any NSAID, having periodic monitoring of renal labs to assess and treat any electrolyte disturbance, acid base disorder, to follow progress of CKD with creatinine and eGFR. I stressed to have BP monitoring at home and to bring readings for review with his future visits with MD.     - periodically monitor renal panel for electrolytes, acid base status, eGFR  - CKD staging, diagnosis, onset of CKD, potential risk of CKD progression, potential risk of BRETT due to volume depletion/ ANDREA/ ATN due to hemodynamic changes d/w patient  - stress to follow low salt diet  - K sparing diuretic per his cardiologist   - trend PTH levels, vit D, phos, corrected Ca and treat as necessary   - take vit D supplements   - trend urine studies for proteinuria  - obtain US kidney for kidney size, rule out mass/ cyst  - avoid NSAID/ bactrim/ IV contrast/ gadolinium/ aminoglycoside/ fleet enema/ PPI where possible  - continue ARB containing regimen for RAAS blockade with close monitoring of potassium levels. Pt encouraged to  monitor BP at home, goal BP level d/w patient. His goal should be less than 130/80.  - anemia seems disproportionate to the level of CKD. Work up as per PCP.     Plan, labs, recommendations were discussed with patient, his questions were answered to his satisfaction.   RTC 4-6 weeks

## 2020-10-03 ENCOUNTER — HOSPITAL ENCOUNTER (OUTPATIENT)
Dept: RADIOLOGY | Facility: HOSPITAL | Age: 60
Discharge: HOME | End: 2020-10-03
Attending: INTERNAL MEDICINE
Payer: MEDICAID

## 2020-10-03 DIAGNOSIS — N18.30 CHRONIC KIDNEY DISEASE, STAGE 3: ICD-10-CM

## 2020-10-03 DIAGNOSIS — I12.9 HYPERTENSIVE KIDNEY DISEASE WITH STAGE 3 CHRONIC KIDNEY DISEASE: ICD-10-CM

## 2020-10-03 DIAGNOSIS — N18.30 CKD (CHRONIC KIDNEY DISEASE), STAGE III: ICD-10-CM

## 2020-10-03 DIAGNOSIS — N18.30 HYPERTENSIVE KIDNEY DISEASE WITH STAGE 3 CHRONIC KIDNEY DISEASE: ICD-10-CM

## 2020-10-03 DIAGNOSIS — R80.9 PROTEINURIA, UNSPECIFIED TYPE: ICD-10-CM

## 2020-10-03 DIAGNOSIS — R80.9 NEPHROTIC RANGE PROTEINURIA: ICD-10-CM

## 2020-10-03 DIAGNOSIS — E87.6 HYPOKALEMIA: ICD-10-CM

## 2020-10-03 PROBLEM — E55.9 VITAMIN D DEFICIENCY: Status: ACTIVE | Noted: 2020-10-03

## 2020-10-03 PROCEDURE — 76770 US EXAM ABDO BACK WALL COMP: CPT | Mod: 26,,, | Performed by: RADIOLOGY

## 2020-10-03 PROCEDURE — 76770 US EXAM ABDO BACK WALL COMP: CPT | Mod: TC

## 2020-10-03 PROCEDURE — 76770 US RETROPERITONEAL COMPLETE: ICD-10-PCS | Mod: 26,,, | Performed by: RADIOLOGY

## 2020-10-05 ENCOUNTER — PATIENT MESSAGE (OUTPATIENT)
Dept: NEPHROLOGY | Facility: CLINIC | Age: 60
End: 2020-10-05

## 2020-10-07 ENCOUNTER — PATIENT OUTREACH (OUTPATIENT)
Dept: OTHER | Facility: OTHER | Age: 60
End: 2020-10-07

## 2020-10-08 NOTE — PROGRESS NOTES
Digital Medicine: Clinician Follow-Up    Called patient for digital medicine follow up. Mr. Gross states that he is doing well overall. Tolerating increased dose of spironolactone with no complaints. Blood pressure has not improved and patient states that he is mostly checking in the morning prior to leaving for work and right after medication. Admits that he does not have time to relax prior to readings. Seen nephrology and no changes were made to regimen.     The history is provided by the patient.   Follow-up reason(s): medication change follow-up and lab follow up.   Patient is not experiencing signs/symptoms of hypoglycemia.      Patient did make medication change.    Is patient tolerating med change? yes            Last 5 Patient Entered Readings                                      Current 30 Day Average: 154/76     Recent Readings 10/8/2020 10/8/2020 10/7/2020 10/6/2020 10/5/2020    SBP (mmHg) 150 153 153 153 185    DBP (mmHg) 80 97 76 80 74    Pulse 75 83 80 76 77                    ASSESSMENT(S)  Patients BP average is 154/76 mmHg, which is above goal. Patient's BP goal is less than or equal to 130/80.     Creatinine and BUN increased on last labs. Dehydration vs. Increased dose of spironolactone.       Hypertension Plan  Labs ordered. Repeat BMP  Expected Lab Date: 10/23/2020  Additional monitoring needed. Advised to check blood pressure daily at least 1 hour or more after medications.   Continue current therapy. Pending repeat BMP and review of readings.   Provided patient education. Encouraged drinking at least 4-5 bottles of water and patient agreed.   Will reduce spironolactone if no improvement in repeat BMP after encouraging hydration.      Addressed patient questions and patient has my contact information if needed prior to next outreach. Patient verbalizes understanding.             There are no preventive care reminders to display for this patient.  There are no preventive care reminders to  display for this patient.      Hypertension Medications             carvediloL (COREG) 25 MG tablet Take 1.5 tablets (37.5 mg total) by mouth 2 (two) times daily with meals.    doxazosin (CARDURA) 2 MG tablet Take 1 tablet (2 mg total) by mouth every evening.    irbesartan (AVAPRO) 300 MG tablet Take 1 tablet (300 mg total) by mouth every evening.    spironolactone (ALDACTONE) 100 MG tablet Take 1 tablet (100 mg total) by mouth once daily.

## 2020-10-13 ENCOUNTER — PATIENT OUTREACH (OUTPATIENT)
Dept: OTHER | Facility: OTHER | Age: 60
End: 2020-10-13

## 2020-10-13 NOTE — PROGRESS NOTES
Digital Medicine: Health  Follow-Up    The history is provided by the patient.             Reason for review: Blood pressure not at goal        Topics Covered on Call: physical activity and Diet    Additional Follow-up details: I called Mr. Gross to follow up. He is doing well overall. He notes a big difference in readings now that he switched to taking them in the afternoons. He has more time to sit and relax in the evenings.     He mentioned seeing something about a heart rate monitor that people can wear and doctors will be able to monitor on the news last night. I encouraged him to reach out to his PCP about that.               Diet-no change to diet    No change to diet.        Physical Activity-no change to routine  No change to exercise routine.       Additional physical activity details: He continues to bike on his days off for 45-60mins.       Medication Adherence-Medication adherence was assessed.      Substance, Sleep, Stress-Not assessed      Continue current diet/physical activity routine.  Instructed to charge device.  Provided patient education.       Addressed patient questions and patient has my contact information if needed prior to next outreach. Patient verbalizes understanding.      Explained the importance of self-monitoring and medication adherence. Encouraged the patient to communicate with their health  for lifestyle modifications to help improve or maintain a healthy lifestyle.               There are no preventive care reminders to display for this patient.      Last 5 Patient Entered Readings                                      Current 30 Day Average: 149/72     Recent Readings 10/12/2020 10/11/2020 10/10/2020 10/9/2020 10/8/2020    SBP (mmHg) 140 130 148 160 137    DBP (mmHg) 69 56 69 76 56    Pulse 81 80 77 71 82

## 2020-10-14 ENCOUNTER — PATIENT MESSAGE (OUTPATIENT)
Dept: CARDIOLOGY | Facility: CLINIC | Age: 60
End: 2020-10-14

## 2020-10-16 ENCOUNTER — PATIENT OUTREACH (OUTPATIENT)
Dept: OTHER | Facility: OTHER | Age: 60
End: 2020-10-16

## 2020-10-16 NOTE — PROGRESS NOTES
Digital Medicine: Clinician Follow-Up    Called patient for digital medicine follow up. Encounter was brief due to patient being at work. Discussed alert for elevated blood pressure yesterday evening and Mr. Gross states that he did not relaxed prior to checking blood pressure. States that he had just finished household chores and then checked. Patient did not have time to repeat readings. He reports adherence to medication regimen.     The history is provided by the patient.   Follow-up reason(s): Alert received.   Care Team received high BP alert.      Hypertension    Readings are trending up due to inaccurate technique.  Patient is not experiencing signs/symptoms of hypertension.            Last 5 Patient Entered Readings                                      Current 30 Day Average: 152/71     Recent Readings 10/15/2020 10/14/2020 10/12/2020 10/11/2020 10/10/2020    SBP (mmHg) 181 159 140 130 148    DBP (mmHg) 67 74 69 56 69    Pulse 81 75 81 80 77                 Depression Screening  Did not address depression screening.    Sleep Apnea Screening    Did not address sleep apnea screening.     Medication Affordability Screening  Did not address medication affordability screening.     Medication Adherence-Medication adherence was assessed.          ASSESSMENT(S)  Patients BP average is 152/71 mmHg, which is above goal. Patient's BP goal is less than or equal to 130/80.     Hypertension Plan  Continue current therapy.  Pending repeat BMP and review of readings.   Provided patient education. Reviewed monitoring technique and encouraged patient ti relax prior to readings. Repeat to confirm blood pressure readings >150/90 mmHg.  Will reduce spironolactone if no improvement in repeat BMP after encouraging hydration.      Addressed patient questions and patient has my contact information if needed prior to next outreach. Patient verbalizes understanding.             There are no preventive care reminders to display for  this patient.  There are no preventive care reminders to display for this patient.      Hypertension Medications             carvediloL (COREG) 25 MG tablet Take 1.5 tablets (37.5 mg total) by mouth 2 (two) times daily with meals.    doxazosin (CARDURA) 2 MG tablet Take 1 tablet (2 mg total) by mouth every evening.    irbesartan (AVAPRO) 300 MG tablet Take 1 tablet (300 mg total) by mouth every evening.    spironolactone (ALDACTONE) 100 MG tablet Take 1 tablet (100 mg total) by mouth once daily.

## 2020-10-19 ENCOUNTER — OFFICE VISIT (OUTPATIENT)
Dept: CARDIOLOGY | Facility: CLINIC | Age: 60
End: 2020-10-19
Payer: MEDICAID

## 2020-10-19 VITALS
WEIGHT: 194 LBS | SYSTOLIC BLOOD PRESSURE: 144 MMHG | BODY MASS INDEX: 27.77 KG/M2 | HEART RATE: 67 BPM | HEIGHT: 70 IN | DIASTOLIC BLOOD PRESSURE: 80 MMHG

## 2020-10-19 DIAGNOSIS — I46.9 CARDIAC ARREST: ICD-10-CM

## 2020-10-19 DIAGNOSIS — I70.213 ATHEROSCLEROSIS OF NATIVE ARTERY OF BOTH LOWER EXTREMITIES WITH INTERMITTENT CLAUDICATION: Primary | ICD-10-CM

## 2020-10-19 DIAGNOSIS — I70.211 ATHEROSCLEROSIS OF NATIVE ARTERY OF RIGHT LOWER EXTREMITY WITH INTERMITTENT CLAUDICATION: ICD-10-CM

## 2020-10-19 DIAGNOSIS — E78.2 MIXED HYPERLIPIDEMIA: ICD-10-CM

## 2020-10-19 DIAGNOSIS — I70.212 ATHEROSCLEROSIS OF NATIVE ARTERY OF LEFT LOWER EXTREMITY WITH INTERMITTENT CLAUDICATION: ICD-10-CM

## 2020-10-19 DIAGNOSIS — N18.32 STAGE 3B CHRONIC KIDNEY DISEASE: ICD-10-CM

## 2020-10-19 DIAGNOSIS — I10 ESSENTIAL HYPERTENSION: ICD-10-CM

## 2020-10-19 DIAGNOSIS — I25.111 CORONARY ARTERY DISEASE INVOLVING NATIVE CORONARY ARTERY OF NATIVE HEART WITH ANGINA PECTORIS WITH DOCUMENTED SPASM: ICD-10-CM

## 2020-10-19 DIAGNOSIS — E78.5 HYPERLIPIDEMIA LDL GOAL <70: ICD-10-CM

## 2020-10-19 PROCEDURE — 99999 PR PBB SHADOW E&M-EST. PATIENT-LVL IV: CPT | Mod: PBBFAC,,, | Performed by: INTERNAL MEDICINE

## 2020-10-19 PROCEDURE — 99215 OFFICE O/P EST HI 40 MIN: CPT | Mod: S$PBB,,, | Performed by: INTERNAL MEDICINE

## 2020-10-19 PROCEDURE — 99214 OFFICE O/P EST MOD 30 MIN: CPT | Mod: PBBFAC,PO | Performed by: INTERNAL MEDICINE

## 2020-10-19 PROCEDURE — 99215 PR OFFICE/OUTPT VISIT, EST, LEVL V, 40-54 MIN: ICD-10-PCS | Mod: S$PBB,,, | Performed by: INTERNAL MEDICINE

## 2020-10-19 PROCEDURE — 99999 PR PBB SHADOW E&M-EST. PATIENT-LVL IV: ICD-10-PCS | Mod: PBBFAC,,, | Performed by: INTERNAL MEDICINE

## 2020-10-19 RX ORDER — SPIRONOLACTONE 100 MG/1
100 TABLET, FILM COATED ORAL DAILY
Qty: 90 TABLET | Refills: 3 | Status: SHIPPED | OUTPATIENT
Start: 2020-10-19 | End: 2020-12-01 | Stop reason: ALTCHOICE

## 2020-10-19 RX ORDER — DOXAZOSIN 4 MG/1
4 TABLET ORAL NIGHTLY
Qty: 90 TABLET | Refills: 3 | Status: SHIPPED | OUTPATIENT
Start: 2020-10-19 | End: 2020-10-27 | Stop reason: SDUPTHER

## 2020-10-19 NOTE — PROGRESS NOTES
Subjective:   Patient ID:  Domingo Gross is a 60 y.o. male who presents for follow up of Coronary Artery Disease, Hyperlipidemia, Hypertension, and Shortness of Breath      HPI:     Domingo Gross 60 y.o. male is here follow up CAD, HTN, HLP, PAF in NSR, edema, and PAD without claudication, after bilateral SFA, GRUPO, EIA, and R CFA revascularization. His BP is not well controlled even with compliance with diet and medications. BP is better but still in the goal range < 130/80 mmHg. He has seen nephrology and later recommended a renal biopsy but he is reasonably reluctant to stop DAPT because of his history of cardiac arrest. /70. He has mild dyspnea and left leg claudication with walking upstairs not so much on flat surfaces.       He was admitted 10/4/2019 for cardiac arrest. He was fully rescued. Initial rhythm was afib with rvr. He was not taken immediately to the cath lab because of ARF + hypokalemia. He had a diagnostic angiogram which revealed patent LM, LAD, RCA stent, and new ostial LCX lesion 99% with R to L collaterals. He had PCI with REZA after his renal function returned to baseline.             ELISABETH with stress 5/2017:   R 1.01 to 0.44   L 0.92 to 0.45    After 6 minutes ambulation      CTA 5/2017:       Patent distal aorta and bilateral GRUPO/EIA stents   L EIA with de shawna 90% stenosis   L SFA 80% with 3 vessel run off     R EIA/CFA with moderate disease   R SFA 80% stenosis with 3 vessel run off        Peripheral angiogram with intervention 6/2017         Patent bilateral GRUPO/EIA stents.    De shawna 80% left EIA stenosis treated with 9.0 x 80 mm Lifestar  stent post dilated with 8.0 mm balloon.    Bilateral 70-80% SFA stenosis with 3 vessel run off.        3/2018      L SFA intervention for claudication   75% L SFA with 3 vessel run off   7 mmHg resting and 33 mmHg hyperemic mean gradient         L CFA antegrade access   PTA with 6.0 x 150 mm   PTA with 6.0 x 150 mm Lutonix   3 vessel run  off           4/13/2018       S/p R SFA PTA for winsome IIb claudication   R CFA antegrade access         R CFA with 50% stenosis-heavily calcified lesion               Baseline /90         R SFA with 70% stenosis with a significant resting and hyperemic mean gradient     3 vessel run off             PTA of R SFA with 6.0 x 150 + 6.0 x 60 mm Lutonix DCB        5/2/2018   Patent arteries post revascularization        2/2019     R 0.84 to 0.27   L 0.90 to 0.66   After ambulation   Severe R calf claudication          Nuclear stress test 2/2019     + ECG with 2 mm ST depression   No wall motion abnormalities   Test stopped at 6 minutes, 7 METs, 86% predicted heart rate   Stopped because of R leg claudication + ECG changes          S/p PCI RCA and LAD with REZA 3/2019       RCA 3.5 x 22 mm Resolute REZA   LAD 2.5 x 30 and 2.5 x 25 mm Resolute REZA      Peripheral aortogram 5/10/2019     R CFA 95% stenosis   3 vessel run off        Seen by Dr. Giron for R CFA endarterectomy but preferable should be off plavix for at least 5 days. He has a risk for stent thrombosis with premature interruption of DAPT. He later had R CFA atherectomy + PTA with DCB.           10/11/2019 for cardiac arrest        S/p staged LCX PCI                    L CFA access              IVUS guided PCI              3.0 x 15 mm Resolute REZA post dilated with 3.5 NC balloon         PET stress 2/2020     No ischemia              Patient Active Problem List    Diagnosis Date Noted    Cardiac arrest 10/04/2019     Priority: High    Atherosclerosis of native artery of both lower extremities with intermittent claudication 03/02/2018     Priority: High    Atherosclerosis of native artery of right lower extremity with intermittent claudication 04/13/2018     Priority: Low    Vitamin D deficiency 10/03/2020    Post-operative state 07/02/2020    Nuclear sclerotic cataract of left eye 07/02/2020    Nuclear sclerosis, bilateral 06/08/2020    CKD  (chronic kidney disease), stage III 04/06/2020    Nephrotic range proteinuria 04/06/2020    Hypertensive kidney disease with stage 3 chronic kidney disease 04/06/2020    Chest pain 01/30/2020    Atrial fibrillation with RVR 10/04/2019    Syncope 10/04/2019    Hypokalemia 10/04/2019    Acute renal failure 10/04/2019    Bilateral carotid artery stenosis     Coronary artery disease involving native coronary artery of native heart with angina pectoris with documented spasm 03/29/2019         3/29/2019    S/p LHC via R radial           LM, LAD, and LCX are patent with luminal irregularities  RCA mid 95% calcified lesion  Normal EF with LVEDP 8 mmHg           PCI of mid LAD with 2.5 x 30 and 2.5 x 15 mm Resolute REZA  PCI of proximal RCA to treat guide dissection with 3.5 x 22 Resolute REZA        10/11/2019 for cardiac arrest        S/p staged LCX PCI                    L CFA access              IVUS guided PCI              3.0 x 15 mm Resolute REZA post dilated with 3.5 NC balloon           Abnormal cardiovascular stress test 02/27/2019         Nuclear stress test 2/2019     + ECG with 2 mm ST depression   No wall motion abnormalities   Test stopped at 6 minutes, 7 METs, 86% predicted heart rate   Stopped because of R leg claudication + ECG changes            Venous insufficiency of both lower extremities 06/04/2018    Localized edema 06/04/2018    Left knee pain 01/19/2016    Pain of left lower extremity 01/19/2016    Difficulty walking 01/19/2016    Acute pain of left knee 12/11/2015    Hyperlipidemia LDL goal <70 06/12/2015    DJD (degenerative joint disease), lumbar 05/27/2015    Lumbar facet arthropathy 05/27/2015    DDD (degenerative disc disease) 05/27/2015    Spondylosis without myelopathy 05/27/2015    Limb pain 11/21/2014    History of back injury 11/21/2014     20 years ago a bag fell on his back at work      Myalgia 11/21/2014    Claudication in peripheral vascular disease 06/13/2014     PAD (peripheral artery disease) 05/21/2014 6/13/2014      1. Three vessel runoff below the knee bilaterally.  2. Severe disease of the terminal aorta.  3. Successful PTAS.  4. Bilateral common iliac reconstruction with 8 x 39 mm stent extending in the aorta  5. Right common illiac was treated with an overlapping 9 x 60 mm SES   6. Left common iliac was treated with an overlapping 8 x 80 mm SES down to external iliac  7. All stents were post dilated with 9.0 x 60 mm balloons  8. Moderate bilateral SFA disease  9. Chronically occluded left internal iliac artery        6/12/2015      1. 80% mid left SFA with a 20 mmHg resting mean gradient  2. Atherectomy with 2.2 [employer] [place] catheter  3. PTA with 6.0 x 100 mm Lutonix drug coated balloon        6/9/2017      Patent bilateral GRPUO/EIA stents  L EIA PTAS 9.0 x 80 mm Life stent post dilated with 8.0 mm balloon  Bilateral 70-80% SFA stenosis with 3 vessel run off          3/2018      L SFA intervention for claudication   75% L SFA with 3 vessel run off   7 mmHg resting and 33 mmHg hyperemic mean gradient         L CFA antegrade access   PTA with 6.0 x 150 mm   PTA with 6.0 x 150 mm Lutonix   3 vessel run off           4/13/2018       S/p R SFA PTA for winsome IIb claudication   R CFA antegrade access         R CFA with 50% stenosis-heavily calcified lesion               Baseline /90         R SFA with 70% stenosis with a significant resting and hyperemic mean gradient     3 vessel run off             PTA of R SFA with 6.0 x 150 + 6.0 x 60 mm Lutonix DCB        5/2/2018   Patent arteries post revascularization        2/2019     R 0.84 to 0.27   L 0.90 to 0.66   After ambulation   Severe R calf claudication        Peripheral angiogram 5/10/2019                   95% R CFA stenosis; heavily calcified    Patent SFA, POP + 3 vessel run off      Peripheral intervention 7/12/2019    S/p right CFA revascularization for winsome IIb claudication       Assisted  JOSY approach   L radial access for visualization   5-6 slender R PT access for delivery of therapy          Atherectomy with [place] catheter   PTA with 7.0 x 40 mm Lutonix DCB            Atherosclerosis of leg with intermittent claudication 2014     Stevie IIB      Elevated TSH 2013    HTN (hypertension) 2013    Elevated fasting blood sugar 2013           Right Arm BP - Sittin/80  Left Arm BP - Sittin/79          LAST HbA1c  Lab Results   Component Value Date    HGBA1C 5.7 (H) 10/28/2019       Lipid panel  Lab Results   Component Value Date    CHOL 211 (H) 2019    CHOL 187 10/28/2019    CHOL 186 2019     Lab Results   Component Value Date    HDL 53 2019    HDL 50 10/28/2019    HDL 74 2019     Lab Results   Component Value Date    LDLCALC 123.8 2019    LDLCALC 101.4 10/28/2019    LDLCALC 92.2 2019     Lab Results   Component Value Date    TRIG 171 (H) 2019    TRIG 178 (H) 10/28/2019    TRIG 99 2019     Lab Results   Component Value Date    CHOLHDL 25.1 2019    CHOLHDL 26.7 10/28/2019    CHOLHDL 39.8 2019            Review of Systems   Constitution: Negative for diaphoresis, night sweats, weight gain and weight loss.   HENT: Negative for congestion.    Eyes: Negative for blurred vision, discharge and double vision.   Cardiovascular: Negative for chest pain, claudication, cyanosis, dyspnea on exertion, irregular heartbeat, leg swelling, near-syncope, orthopnea, palpitations, paroxysmal nocturnal dyspnea and syncope.   Respiratory: Negative for cough, shortness of breath and wheezing.    Endocrine: Negative for cold intolerance, heat intolerance and polyphagia.   Hematologic/Lymphatic: Negative for adenopathy and bleeding problem. Does not bruise/bleed easily.   Skin: Negative for dry skin and nail changes.   Musculoskeletal: Negative for arthritis, back pain, falls, joint pain, myalgias and neck pain.    Gastrointestinal: Negative for bloating, abdominal pain, change in bowel habit and constipation.   Genitourinary: Negative for bladder incontinence, dysuria, flank pain, genital sores and missed menses.   Neurological: Negative for aphonia, brief paralysis, difficulty with concentration, dizziness and weakness.   Psychiatric/Behavioral: Negative for altered mental status and memory loss. The patient does not have insomnia.    Allergic/Immunologic: Negative for environmental allergies.       Objective:   Physical Exam   Constitutional: He is oriented to person, place, and time. He appears well-developed and well-nourished. He is not intubated.   HENT:   Head: Normocephalic and atraumatic.   Right Ear: External ear normal.   Left Ear: External ear normal.   Mouth/Throat: Oropharynx is clear and moist.   Eyes: Pupils are equal, round, and reactive to light. Conjunctivae and EOM are normal. Right eye exhibits no discharge. Left eye exhibits no discharge. No scleral icterus.   Neck: Normal range of motion. Neck supple. Normal carotid pulses, no hepatojugular reflux and no JVD present. Carotid bruit is not present. No tracheal deviation present. No thyromegaly present.   Cardiovascular: Normal rate, regular rhythm, S1 normal and S2 normal.  No extrasystoles are present. PMI is not displaced. Exam reveals no gallop, no S3, no distant heart sounds, no friction rub and no midsystolic click.   No murmur heard.  Pulses:       Carotid pulses are 2+ on the right side and 2+ on the left side.       Radial pulses are 2+ on the right side and 2+ on the left side.        Femoral pulses are 2+ on the right side and 2+ on the left side.       Popliteal pulses are 2+ on the right side and 2+ on the left side.        Dorsalis pedis pulses are 1+ on the right side and 2+ on the left side.        Posterior tibial pulses are 1+ on the right side and 2+ on the left side.         Triphasic bilateral DP and PT doppler signals            Pulmonary/Chest: Effort normal and breath sounds normal. No accessory muscle usage or stridor. No apnea, no tachypnea and no bradypnea. He is not intubated. No respiratory distress. He has no decreased breath sounds. He has no wheezes. He has no rales. He exhibits no tenderness and no bony tenderness.   Abdominal: He exhibits no distension, no pulsatile liver, no abdominal bruit, no ascites, no pulsatile midline mass and no mass. There is no abdominal tenderness. There is no rebound and no guarding.   Musculoskeletal: Normal range of motion.         General: No tenderness or edema.   Lymphadenopathy:     He has no cervical adenopathy.   Neurological: He is alert and oriented to person, place, and time. He has normal reflexes. No cranial nerve deficit. Coordination normal.   Skin: Skin is warm. No rash noted. No erythema. No pallor.   Psychiatric: He has a normal mood and affect. His behavior is normal. Judgment and thought content normal.       Assessment:     1. Atherosclerosis of native artery of both lower extremities with intermittent claudication    2. Cardiac arrest    3. Atherosclerosis of native artery of right lower extremity with intermittent claudication    4. Essential hypertension    5. Atherosclerosis of native artery of left lower extremity with intermittent claudication    6. Coronary artery disease involving native coronary artery of native heart with angina pectoris with documented spasm    7. Stage 3b chronic kidney disease    8. Mixed hyperlipidemia        Plan:         Medical therapy for CAD + PAD  DAPT with aspirin + plavix  Intense statin therapy  Arb  Pletal       Target BP < 130/80 mmHg  Target LDL < 70  Low salt diet  Weight loss        Add cardura 2 mg po bid then titrate to a total of 8 mg if needed  He can stop plavix for renal biopsy  Will switch from lipitor to crestor 40 mg qhs if LDL remains > 70      Continue with current medical plan and lifestyle changes.  Return sooner for  concerns or questions. If symptoms persist go to the ED  I have reviewed all pertinent data on this patient   Referral for cataract disease        Follow up as scheduled. Return sooner for concerns or questions  He expressed verbal understanding and agreed with the plan        Greater than 50% of the visit of 45 minutes was spent counseling, educating, and coordinating the care of the patient.        Greater than 50% of the visit of 45 minutes was spent counseling, educating, and coordinating the care of the patient.      -In today's visit, at least 4 established conditions that pose a risk to life or bodily function have been addressed and the conditions are severe.    -In today's visit, monitoring for drug toxicity was accomplished.      I have reviewed the patient's medical history in detail and updated the computerized patient record.    Orders Placed This Encounter   Procedures    Lipid Panel     Standing Status:   Future     Standing Expiration Date:   12/18/2021    Ankle Brachial Indices (ELISABETH)     Standing Status:   Future     Standing Expiration Date:   10/19/2021     Order Specific Question:   Exam Type:     Answer:   Exercise       Follow up as scheduled. Return sooner for concerns or questions                    Patient's Medications   New Prescriptions    No medications on file   Previous Medications    ALBUTEROL (PROVENTIL/VENTOLIN HFA) 90 MCG/ACTUATION INHALER    INHALE 2 PUFFS INTO THE LUNGS EVERY 6 HOURS AS NEEDED FOR WHEEZING    ASPIRIN (ASPIR-81 ORAL)    Take 1 tablet by mouth.    ATORVASTATIN (LIPITOR) 80 MG TABLET    Take 1 tablet (80 mg total) by mouth every evening.    BENZONATATE (TESSALON) 200 MG CAPSULE    Take 1 capsule (200 mg total) by mouth 3 (three) times daily as needed for Cough.    CARVEDILOL (COREG) 25 MG TABLET    Take 1.5 tablets (37.5 mg total) by mouth 2 (two) times daily with meals.    CHOLECALCIFEROL, VITAMIN D3, 1,250 MCG (50,000 UNIT) CAPSULE    Take 1 capsule (50,000  Units total) by mouth once a week.    CILOSTAZOL (PLETAL) 100 MG TAB    Take 1 tablet (100 mg total) by mouth 2 (two) times daily.    CLOPIDOGREL (PLAVIX) 75 MG TABLET    Take 1 tablet (75 mg total) by mouth once daily.    COENZYME Q10 (COQ-10) 100 MG CAPSULE    Take 1 capsule (100 mg total) by mouth once daily.    FLUNISOLIDE 25 MCG, 0.025%, (NASALIDE) 25 MCG (0.025 %) SPRY    2 sprays by Nasal route 2 (two) times daily.    IRBESARTAN (AVAPRO) 300 MG TABLET    Take 1 tablet (300 mg total) by mouth every evening.   Modified Medications    Modified Medication Previous Medication    DOXAZOSIN (CARDURA) 4 MG TABLET doxazosin (CARDURA) 2 MG tablet       Take 1 tablet (4 mg total) by mouth every evening.    Take 1 tablet (2 mg total) by mouth every evening.    SPIRONOLACTONE (ALDACTONE) 100 MG TABLET spironolactone (ALDACTONE) 100 MG tablet       Take 1 tablet (100 mg total) by mouth once daily.    Take 1 tablet (100 mg total) by mouth once daily.   Discontinued Medications    No medications on file

## 2020-10-19 NOTE — PATIENT INSTRUCTIONS
Heart Disease Education    The heart beats 60 to 100 times per minute, 24 hours a day. This equals almost 1000,000 times a day. It pumps blood with oxygen and nutrients to the tissues and organs of the body. But the heart is a muscle and needs its own supply of blood. Blood flow to the heart is supplied by the coronary arteries. Coronary artery disease (atherosclerosis) is a result of cholesterol, saturated fat, and calcium deposits (plaques) that build up inside the walls. This causes inflammation within the coronary arteries. These plaques narrow the artery and reduce blood flow to the heart muscle. The reduction in blood flow to the heart muscle decreases oxygen supply to the heart. If the narrowing is significant enough, the oxygen supply to one or more regions of the heart can be temporarily or permanently shut down. This can cause chest pain, and possibly death of heart tissue (heart attack).  Types of chest pain  Angina is the name for pain in the heart muscle. Angina is a warning sign of serious heart disease. When untreated it can lead to a heart attack, also known as acute myocardial infarction, or AMI. Angina occurs when there is not enough blood and oxygen flowing to the heart for the amount of work it is doing. This most often happens during physical exertion, when the heart is working hardest. It is usually relieved by rest or nitroglycerin. Angina may also occur after a large meal when extra blood is sent to the digestive organs and less goes to the heart. In the case of advanced or unstable heart disease, angina can occur at rest or awaken you from sleep. Angina usually lasts from a few minutes up to 20 minutes or more. When treated early, the effects of angina can be reversed without permanent damage to the heart. Angina is a serious condition and needs to be evaluated by a medical professional immediately.  There are two types of angina -- stable and unstable:  · Stable angina usually occurs  with a predictable level of activity. Being stable, its character, severity, and occurrence do not change much over time. It usually starts with activity, and resolves with rest or taking your medicine as instructed by your doctor. The symptoms usually do not last long.  · Unstable angina changes or gets worse over time. It is different from whatever you are used to. It may feel different or worse, begin without cause, occur with exercise or exertion, wake you up from sleep, and last longer. It may not respond in the same way as it does when you take your usual medicines for an attack. This type of angina can be a warning sign of an impending heart attack.     A heart attack is usually the result of a blood clot that suddenly forms in a coronary artery that has been narrowed with plaque. When this occurs, blood flow may be cut off to a part of the heart muscle, causing the cells to die. This weakens the pumping action of the heart, which affects the delivery of blood to all the other organs in the body including the brain. This damage is not reversible. However, early treatment can limit the amount of damage.  The pain you feel with angina and a heart attack may have a similar quality. However, it is usually different in intensity and duration. Here are some typical descriptions of a heart attack:  · It is most often experienced as a squeezing, crushing, pressure-like sensation in the center of the chest.  · It is sometimes described as something heavy sitting on my chest.  · It may feel more like a bad case of indigestion.  · The pain may spread from the chest to the arm, shoulder, throat or jaw.  · Sometimes the pain is not felt in the chest at all, but only in the arm, shoulder, throat or jaw.  · There may also be nausea, vomiting, dizziness or light-headedness, sweating and trouble breathing.  · Palpitations, or your heart beating rapidly  · A new, irregular heart beat  · Unexplained weakness  You may not be  "able to tell the difference between "bad" angina and a heart attack at home. Seek help if your symptoms are different than usual. Do not be in denial or just try to "tough it out."  Call 911  This is the fastest and safest way to get to the emergency department. The paramedics can also start treatment on the way to the hospital, saving valuable time for your heart.  · If the angina gets worse, if it continues, or if it stops and returns, call 911 immediately. Do not delay. You may be having a heart attack.  · After you call 911, take a second tablet or spray unless instructed otherwise. When repeating doses, sit down if possible, because it can make you feel lightheaded or dizzy. Wait another 5 minutes. If the angina still does not go away, take a third tablet or spray. Do not take more than 3 tablets or sprays within 15 minutes. Stay on the phone with 911 for further instruction.  · Your healthcare provider may give you slightly different instructions than those above. If so, follow them carefully.  Do not wait until symptoms become severe to call 911.  Other reasons to call 911 include:  · Trouble breathing  · Feeling lightheaded, faint, or dizzy  · Rapid heart beat  · Slower than usual heart rate compared to your normal  · Angina with weakness, dizziness, fainting, heavy sweating, nausea, or vomiting  · Extreme drowsiness, confusion  · Weakness of an arm or leg or one side of the face  · Difficulty with speech or vision  When to seek medical care  Remember, the signs and symptoms of a heart attack are not always like they are on TV. Sometimes they are not so obvious. You may only feel weak, or just not right. If it is not clear or if you have any doubt, call for advice.  · Seek help if there is a change in the type of pain, if it feels different, or if your symptoms are mild.  · Do not drive yourself. Have someone else drive you. If no one can drive, call 911.  · Do not delay. Fast diagnosis and treatment can " "prevent or limit the amount of heart damage during a heart attack.  · Do not go to your doctor's office or a clinic as they may not be able to provide all the testing and treatment required for this condition.  · If your doctor has given you medicine to take when symptoms occur, take them but don't delay getting help trying to locate medicines.  What happens in the emergency department  The emergency department is connected to your local emergency medical system (EMS) through 911. That's why during a cardiac emergency, calling 911 is the fastest way to get help. The goal of the emergency department is to rapidly screen, evaluate, and treat people.  Once you are there, an electrocardiogram (ECG or heart tracing) will be done. Blood samples may be taken to look for the presence of heart enzymes that leak from damaged heart cells and show if a heart attack is occurring. You will often be evaluated by a heart specialist (cardiologist) who decides the best course of action. In the case of severe angina or early heart attack, and depending on the circumstances, powerful "clot busting" medicines can be used to dissolve blood clots in the coronary artery. In other cases, you may be taken to a cardiac catheterization lab. Here, a tiny balloon-tipped catheter is advanced through blood vessels to the heart. There the balloon is inflated pushing open the blood vessel restoring blood flow.  Risk factors for heart disease  Risk factors for heart disease are a combination of genetic and lifestyle. Many risk factors work by either directly or indirectly damaging the blood vessels of the heart, or by increasing the risk of forming blood or cholesterol clots, which then clog up and block the arteries.     Examples of physical lifestyle risk factors:  · Cigarette smoking  · High blood pressure  · High blood cholesterol  · Use of stimulant drugs such as cocaine, crack, and amphetamines  · Eating a high-fat, high-cholesterol " meal  · Diabetes   · Obesity which increases risk for diabetes and high blood pressure  · Lack of regular physical activity     Examples of emotional lifestyle factors:  · Chronic high stress levels release stress hormones. These raise blood pressure and cholesterol level and makes blood clot more easily.  · Held-in anger, hostile or cynical attitude  · Social and emotional isolation, lack of intimacy  · Loss of relationship  · Depression  Other factors that increase the risk of heart attack that you cannot control :  · Age. The older you get beyond 40, the greater is your risk of significant coronary artery disease.  · Gender. More men than women get heart disease; but once past menopause, women who are not taking estrogen replacement have the same risk as men for a heart attack.  · Family history. If your mother, father, brother or sister has coronary artery disease, your risk of having it is higher than a person your age without this family history.  What can you do to decrease your risk  To reduce your risk of heart disease:  · Get regular checkups with your doctor.  · Take your medicines for blood pressure, cholesterol or diabetes as directed.  · Watch your diet. Eat a heart healthy diet choosing fresh foods, less salt, cholesterol, and fat  · Stop smoking. Get help if needed.  · Get regular exercise.  · Manage stress.  · Carry a list of medicines and doses in your wallet.  Date Last Reviewed: 12/30/2015  © 7250-8403 [employer]. [address] [place] [address], [place] [address]. All rights reserved. This information is not intended as a substitute for professional medical care. Always follow your healthcare professional's instructions.        Taking Aspirin for Atherosclerosis       Aspirin is a medicine often prescribed to treat atherosclerosis. This condition affects your arteries. These are the blood vessels that carry blood away from your heart. Having atherosclerosis means youre at greater risk of  a heart attack or stroke. Aspirin can help prevent these from happening.  How atherosclerosis affects your arteries   A fatty material (plaque) can build up in your arteries. This makes it harder for blood to flow through them. A blood clot can then form on the plaque. This may block the artery, cutting off blood flow. This can cause conditions such as coronary artery disease (CAD) and peripheral arterial disease (PAD):  · CAD occurs when plaque builds up in the coronary artery. This artery supplies the heart with oxygen-rich blood.  · PAD occurs when plaque forms in leg arteries.  The same things that cause CAD and PAD can also cause plaque to form in other arteries in your body, such as those in the brain. When plaque occurs in any of these arteries, it raises your risk of heart attack or stroke.  What aspirin does  Aspirin is a blood-thinner (antiplatelet medicine). It helps keep blood clots from forming. This reduces the risk of blockage. Aspirin can be taken daily if you are at high risk of or have already had a heart attack or stroke. It is also used after a procedure called a stent placement. This is when a tiny wire mesh tube, or stent, is placed in an artery to keep it open. Aspirin helps prevent blood clots from forming on the stent.  Taking aspirin safely  Tell your healthcare provider about any medicines you are taking. This includes:  · All prescription medicines  · Over-the-counter medicines  · Herbs, vitamins, and other supplements  Also mention if you have a history of ulcers or bleeding problems. Ask whether you will need to stop taking aspirin before having surgery or dental work. Always take medicines as directed.  Tips for taking aspirin  · Have a routine. For example, take aspirin with the same meal each day.  · Dont take more than prescribed. A low dose gives the same benefit as a higher one, with a lower risk of side effects.  · Dont skip doses. Aspirin needs to be taken each day to work  well.  · Keep track of what you take. A pillbox with days of the week can help, especially if you take several medicines. Or use a list or chart to keep track.  When to call your healthcare provider  Side effects of aspirin are not usually serious. If you do have problems, changing your dose may help. Call your healthcare provider if you have any of the following:  · Excessive bruising (some bruising is normal)  · Nosebleeds, bleeding gums, or other excessive bleeding  · An upset stomach or stomach pain   Date Last Reviewed: 6/1/2016  © 0397-3691 [employer]. [address] [place] [address]. All rights reserved. This information is not intended as a substitute for professional medical care. Always follow your healthcare professional's instructions.

## 2020-10-23 ENCOUNTER — LAB VISIT (OUTPATIENT)
Dept: LAB | Facility: HOSPITAL | Age: 60
End: 2020-10-23
Attending: INTERNAL MEDICINE
Payer: MEDICAID

## 2020-10-23 DIAGNOSIS — E55.9 VITAMIN D DEFICIENCY: ICD-10-CM

## 2020-10-23 LAB — 25(OH)D3+25(OH)D2 SERPL-MCNC: 26 NG/ML (ref 30–96)

## 2020-10-23 PROCEDURE — 82306 VITAMIN D 25 HYDROXY: CPT

## 2020-10-23 PROCEDURE — 36415 COLL VENOUS BLD VENIPUNCTURE: CPT | Mod: PO

## 2020-10-30 ENCOUNTER — PATIENT MESSAGE (OUTPATIENT)
Dept: ADMINISTRATIVE | Facility: HOSPITAL | Age: 60
End: 2020-10-30

## 2020-10-30 ENCOUNTER — PATIENT MESSAGE (OUTPATIENT)
Dept: INTERNAL MEDICINE | Facility: CLINIC | Age: 60
End: 2020-10-30

## 2020-10-30 DIAGNOSIS — E55.9 VITAMIN D DEFICIENCY: ICD-10-CM

## 2020-10-30 RX ORDER — ASPIRIN 325 MG
50000 TABLET, DELAYED RELEASE (ENTERIC COATED) ORAL WEEKLY
Qty: 12 CAPSULE | Refills: 3 | Status: SHIPPED | OUTPATIENT
Start: 2020-10-30 | End: 2020-11-25 | Stop reason: SDUPTHER

## 2020-11-02 ENCOUNTER — LAB VISIT (OUTPATIENT)
Dept: LAB | Facility: HOSPITAL | Age: 60
End: 2020-11-02
Attending: INTERNAL MEDICINE
Payer: MEDICAID

## 2020-11-02 ENCOUNTER — PATIENT OUTREACH (OUTPATIENT)
Dept: OTHER | Facility: OTHER | Age: 60
End: 2020-11-02

## 2020-11-02 DIAGNOSIS — I10 ESSENTIAL HYPERTENSION: ICD-10-CM

## 2020-11-02 PROCEDURE — 36415 COLL VENOUS BLD VENIPUNCTURE: CPT | Mod: PO

## 2020-11-02 PROCEDURE — 80048 BASIC METABOLIC PNL TOTAL CA: CPT

## 2020-11-02 RX ORDER — CARVEDILOL 25 MG/1
50 TABLET ORAL 2 TIMES DAILY WITH MEALS
Qty: 360 TABLET | Refills: 0 | Status: SHIPPED | OUTPATIENT
Start: 2020-11-02 | End: 2020-11-23 | Stop reason: SDUPTHER

## 2020-11-02 NOTE — PROGRESS NOTES
Digital Medicine: Clinician Follow-Up    Called patient for digital medicine follow up. Doxazosin was increased to 4 mg at last cardiologist visit. Plan to add amlodipine 10 mg however, patient states that he had swelling.     The history is provided by the patient.      Review of patient's allergies indicates:   -- Ace inhibitors -- Rash  Follow-up reason(s): Alert received.   Care Team received high BP alert.      Hypertension    Readings are trending up Patient is not experiencing signs/symptoms of hypertension.            Last 5 Patient Entered Readings                                      Current 30 Day Average: 160/73     Recent Readings 11/1/2020 10/26/2020 10/24/2020 10/23/2020 10/21/2020    SBP (mmHg) 183 157 174 153 152    DBP (mmHg) 80 69 75 72 62    Pulse 79 73 80 81 75                 Depression Screening  Did not address depression screening.    Sleep Apnea Screening    Did not address sleep apnea screening.     Medication Affordability Screening  Did not address medication affordability screening.     Medication Adherence-Medication adherence was assessed.          ASSESSMENT(S)  Patients BP average is 160/73 mmHg, which is above goal. Patient's BP goal is less than or equal to 130/80.     Hypertension Plan  Hypertension Medication Change. Increase carvedilol to 50 mg twice daily. Continue other medications as prescribed.   Labs ordered. BMP Expected Lab Date: 11/2/2020  Monitor renal function closely due to slight increase in creatinine on last labs.  Can maximize doxazosin to 8 mg if blood pressure remains uncontrolled. May also benefit from a CCB.       Addressed patient questions and patient has my contact information if needed prior to next outreach. Patient verbalizes understanding.             There are no preventive care reminders to display for this patient.  There are no preventive care reminders to display for this patient.      Hypertension Medications             carvediloL (COREG) 25 MG  tablet Take 2 tablets (50 mg total) by mouth 2 (two) times daily with meals.    doxazosin (CARDURA) 4 MG tablet Take 1 tablet (4 mg total) by mouth every evening.    irbesartan (AVAPRO) 300 MG tablet Take 1 tablet (300 mg total) by mouth every evening.    spironolactone (ALDACTONE) 100 MG tablet Take 1 tablet (100 mg total) by mouth once daily.

## 2020-11-03 LAB
ANION GAP SERPL CALC-SCNC: 10 MMOL/L (ref 8–16)
BUN SERPL-MCNC: 19 MG/DL (ref 6–20)
CALCIUM SERPL-MCNC: 8.7 MG/DL (ref 8.7–10.5)
CHLORIDE SERPL-SCNC: 106 MMOL/L (ref 95–110)
CO2 SERPL-SCNC: 21 MMOL/L (ref 23–29)
CREAT SERPL-MCNC: 1.8 MG/DL (ref 0.5–1.4)
EST. GFR  (AFRICAN AMERICAN): 46.6 ML/MIN/1.73 M^2
EST. GFR  (NON AFRICAN AMERICAN): 40.3 ML/MIN/1.73 M^2
GLUCOSE SERPL-MCNC: 118 MG/DL (ref 70–110)
POTASSIUM SERPL-SCNC: 4 MMOL/L (ref 3.5–5.1)
SODIUM SERPL-SCNC: 137 MMOL/L (ref 136–145)

## 2020-11-05 ENCOUNTER — PATIENT MESSAGE (OUTPATIENT)
Dept: INTERNAL MEDICINE | Facility: CLINIC | Age: 60
End: 2020-11-05

## 2020-11-06 ENCOUNTER — PATIENT MESSAGE (OUTPATIENT)
Dept: OTHER | Facility: OTHER | Age: 60
End: 2020-11-06

## 2020-11-06 ENCOUNTER — PATIENT OUTREACH (OUTPATIENT)
Dept: OTHER | Facility: OTHER | Age: 60
End: 2020-11-06

## 2020-11-06 NOTE — PROGRESS NOTES
Digital Medicine: Clinician Follow-Up    Called patient for digital medicine follow up. Mr. Gross states that he is doing well. At last encounter, carvedilol was increased to 50 mg twice daily. Patient is unable to recall if he has been taking medication that way. Asked that I send lis via [facility] so that he can compare with pillbox this evening.     The history is provided by the patient.   Follow-up reason(s): medication change follow-up and lab follow up.     Hypertension    Patient's blood pressure is stable. Patient is not experiencing signs/symptoms of hypertension.            Last 5 Patient Entered Readings                                      Current 30 Day Average: 158/73     Recent Readings 11/5/2020 11/1/2020 10/26/2020 10/24/2020 10/23/2020    SBP (mmHg) 155 183 157 174 153    DBP (mmHg) 88 80 69 75 72    Pulse 81 79 73 80 81                    ASSESSMENT(S)  Patients BP average is 158/73 mmHg, which is above goal. Patient's BP goal is less than or equal to 130/80.     Possible that patient is not taking correct dose of carvedilol.       Hypertension Plan  Continue current therapy.  Verified medication dosing and sent list as request. Patient will message me to confirm if he has been taking increased dose. If not he will start tonight.   Creatinine stable per last note may be patient's new baseline. Will monitor closely.      Addressed patient questions and patient has my contact information if needed prior to next outreach. Patient verbalizes understanding.             There are no preventive care reminders to display for this patient.  There are no preventive care reminders to display for this patient.      Hypertension Medications             carvediloL (COREG) 25 MG tablet Take 2 tablets (50 mg total) by mouth 2 (two) times daily with meals.    doxazosin (CARDURA) 4 MG tablet Take 1 tablet (4 mg total) by mouth every evening.    irbesartan (AVAPRO) 300 MG tablet Take 1 tablet (300 mg total) by  mouth every evening.    spironolactone (ALDACTONE) 100 MG tablet Take 1 tablet (100 mg total) by mouth once daily.

## 2020-11-10 ENCOUNTER — PATIENT OUTREACH (OUTPATIENT)
Dept: OTHER | Facility: OTHER | Age: 60
End: 2020-11-10

## 2020-11-19 ENCOUNTER — PATIENT OUTREACH (OUTPATIENT)
Dept: OTHER | Facility: OTHER | Age: 60
End: 2020-11-19

## 2020-11-19 DIAGNOSIS — I10 ESSENTIAL HYPERTENSION: Primary | ICD-10-CM

## 2020-11-19 NOTE — PROGRESS NOTES
Left message for digital medicine follow up. Will confirm that patient increased dose of carvedilol. Reviewed chart and patient cancelled appointment with endocrinology on 11/16/2020. Will review patient again at next roundtable.     Last 5 Patient Entered Readings                                      Current 30 Day Average: 159/74     Recent Readings 11/18/2020 11/16/2020 11/15/2020 11/15/2020 11/14/2020    SBP (mmHg) 143 162 130 145 165    DBP (mmHg) 70 78 82 92 65    Pulse 80 74 106 101 77        Hypertension Medications             carvediloL (COREG) 25 MG tablet Take 2 tablets (50 mg total) by mouth 2 (two) times daily with meals.    doxazosin (CARDURA) 4 MG tablet Take 1 tablet (4 mg total) by mouth every evening.    irbesartan (AVAPRO) 300 MG tablet Take 1 tablet (300 mg total) by mouth every evening.    spironolactone (ALDACTONE) 100 MG tablet Take 1 tablet (100 mg total) by mouth once daily.

## 2020-11-20 ENCOUNTER — PATIENT MESSAGE (OUTPATIENT)
Dept: INTERNAL MEDICINE | Facility: CLINIC | Age: 60
End: 2020-11-20

## 2020-11-21 ENCOUNTER — PATIENT MESSAGE (OUTPATIENT)
Dept: CARDIOLOGY | Facility: CLINIC | Age: 60
End: 2020-11-21

## 2020-11-21 DIAGNOSIS — I73.9 PAD (PERIPHERAL ARTERY DISEASE): ICD-10-CM

## 2020-11-21 DIAGNOSIS — I10 ESSENTIAL HYPERTENSION: ICD-10-CM

## 2020-11-23 ENCOUNTER — PATIENT MESSAGE (OUTPATIENT)
Dept: INTERNAL MEDICINE | Facility: CLINIC | Age: 60
End: 2020-11-23

## 2020-11-23 ENCOUNTER — PATIENT MESSAGE (OUTPATIENT)
Dept: ADMINISTRATIVE | Facility: OTHER | Age: 60
End: 2020-11-23

## 2020-11-23 RX ORDER — CILOSTAZOL 100 MG/1
100 TABLET ORAL 2 TIMES DAILY
Qty: 180 TABLET | Refills: 3 | Status: ON HOLD | OUTPATIENT
Start: 2020-11-23 | End: 2021-07-17 | Stop reason: HOSPADM

## 2020-11-23 RX ORDER — CARVEDILOL 25 MG/1
50 TABLET ORAL 2 TIMES DAILY WITH MEALS
Qty: 360 TABLET | Refills: 0 | Status: SHIPPED | OUTPATIENT
Start: 2020-11-23 | End: 2021-02-24 | Stop reason: SDUPTHER

## 2020-11-25 ENCOUNTER — PATIENT MESSAGE (OUTPATIENT)
Dept: ADMINISTRATIVE | Facility: OTHER | Age: 60
End: 2020-11-25

## 2020-11-25 ENCOUNTER — OFFICE VISIT (OUTPATIENT)
Dept: INTERNAL MEDICINE | Facility: CLINIC | Age: 60
End: 2020-11-25
Payer: MEDICAID

## 2020-11-25 ENCOUNTER — TELEPHONE (OUTPATIENT)
Dept: CARDIOLOGY | Facility: CLINIC | Age: 60
End: 2020-11-25

## 2020-11-25 VITALS
BODY MASS INDEX: 29.32 KG/M2 | DIASTOLIC BLOOD PRESSURE: 72 MMHG | HEART RATE: 73 BPM | OXYGEN SATURATION: 99 % | TEMPERATURE: 98 F | SYSTOLIC BLOOD PRESSURE: 140 MMHG | WEIGHT: 204.38 LBS

## 2020-11-25 DIAGNOSIS — N64.4 NIPPLE SORENESS: Primary | ICD-10-CM

## 2020-11-25 DIAGNOSIS — I73.9 PAD (PERIPHERAL ARTERY DISEASE): ICD-10-CM

## 2020-11-25 DIAGNOSIS — R07.9 CHEST PAIN, UNSPECIFIED TYPE: ICD-10-CM

## 2020-11-25 PROCEDURE — 99213 OFFICE O/P EST LOW 20 MIN: CPT | Mod: PBBFAC,PO | Performed by: INTERNAL MEDICINE

## 2020-11-25 PROCEDURE — 99213 PR OFFICE/OUTPT VISIT, EST, LEVL III, 20-29 MIN: ICD-10-PCS | Mod: S$PBB,,, | Performed by: INTERNAL MEDICINE

## 2020-11-25 PROCEDURE — 93005 ELECTROCARDIOGRAM TRACING: CPT | Mod: PBBFAC,PO | Performed by: INTERNAL MEDICINE

## 2020-11-25 PROCEDURE — 93010 EKG 12-LEAD: ICD-10-PCS | Mod: S$PBB,,, | Performed by: INTERNAL MEDICINE

## 2020-11-25 PROCEDURE — 99999 PR PBB SHADOW E&M-EST. PATIENT-LVL III: CPT | Mod: PBBFAC,,, | Performed by: INTERNAL MEDICINE

## 2020-11-25 PROCEDURE — 99213 OFFICE O/P EST LOW 20 MIN: CPT | Mod: S$PBB,,, | Performed by: INTERNAL MEDICINE

## 2020-11-25 PROCEDURE — 99999 PR PBB SHADOW E&M-EST. PATIENT-LVL III: ICD-10-PCS | Mod: PBBFAC,,, | Performed by: INTERNAL MEDICINE

## 2020-11-25 PROCEDURE — 93010 ELECTROCARDIOGRAM REPORT: CPT | Mod: S$PBB,,, | Performed by: INTERNAL MEDICINE

## 2020-11-25 RX ORDER — CILOSTAZOL 100 MG/1
100 TABLET ORAL 2 TIMES DAILY
Qty: 180 TABLET | Refills: 3 | Status: CANCELLED | OUTPATIENT
Start: 2020-11-25

## 2020-11-25 NOTE — TELEPHONE ENCOUNTER
Called the pt and asked if he has received the refill of pletal from his pharmacy.   The pt stated he went to  the medication but the pharmacy informed him it was too soon to . Informed the pt refill was sent to the pharmacy on Monday 11- by HELEN Everett here in cardiology.   Pt stated he has been out of medication.   Advised pt to reach out to the pharmacy and verify refill to     [facility]

## 2020-11-26 NOTE — PROGRESS NOTES
Patient ID: Domingo Gross is a 60 y.o. male.    Chief Complaint: No chief complaint on file.    MEJIA Chirinos is a 60 y.o. male with  CAD, HTN, HLD, PAD, s/p recent cardiac arrest who presents with chief complaint of pain. Pain is located in the area of both nipples. Onset was one week ago. It is a sharp pain in character. It is worsened by touching the nipples or movement. It lasts for seconds in duration when it occurs. He takes aspirin daily and this has not provided relief. Nothing is making the symptom better. Severity is 3/10.    Review of Systems   Skin:        Pain/discomfort with touching nipples or movement     All other systems reviewed and are negative.        Objective:     Vitals:    11/25/20 1447   BP: (!) 140/72   Pulse: 73   Temp: 97.7 °F (36.5 °C)        Physical Exam  Vitals signs reviewed.   Constitutional:       General: He is not in acute distress.     Appearance: Normal appearance. He is well-developed. He is not ill-appearing, toxic-appearing or diaphoretic.   HENT:      Head: Normocephalic and atraumatic.      Right Ear: External ear normal.      Left Ear: External ear normal.      Nose: Nose normal.   Eyes:      Extraocular Movements: Extraocular movements intact.      Conjunctiva/sclera: Conjunctivae normal.   Cardiovascular:      Rate and Rhythm: Normal rate and regular rhythm.      Heart sounds: Normal heart sounds. No murmur. No friction rub. No gallop.    Pulmonary:      Effort: Pulmonary effort is normal. No respiratory distress.      Breath sounds: Normal breath sounds. No stridor. No wheezing, rhonchi or rales.   Musculoskeletal:      Comments: Pt reports tenderness with palpation of bilateral nipples. No visible erythema, edema, ecchymosis or deformity noted of nipples. No masses palpated. No nipple discharge.    Skin:     General: Skin is warm and dry.   Neurological:      General: No focal deficit present.      Mental Status: He is alert and oriented to person, place, and time.  Mental status is at baseline.   Psychiatric:         Mood and Affect: Mood normal.         Behavior: Behavior normal.         Thought Content: Thought content normal.         Judgment: Judgment normal.         Assessment:       1. Nipple soreness Active   2. Chest pain, unspecified type        Plan:     EKG with no concerning findings. Normal exam. Suspect due to use of spironolactone.Will message his cardiologist regarding this. May be beneficial to change to eplerenone if this continues. Also discussed with patient that if it persists for next one month, he should let me know and we can get breast tissue ultrasound to ensure no underlying abnormality. Pt understands and agrees with plan.      Nipple soreness    Chest pain, unspecified type  -     EKG 12-lead; Future        RTC PRN    Warning signs discussed, patient to call with any further issues or worsening of symptoms.       Parts of the above note were dictated using a voice dictation software. Please excuse any grammatical or typographical errors.

## 2020-11-30 ENCOUNTER — PATIENT MESSAGE (OUTPATIENT)
Dept: INTERNAL MEDICINE | Facility: CLINIC | Age: 60
End: 2020-11-30

## 2020-12-01 RX ORDER — EPLERENONE 50 MG/1
50 TABLET, FILM COATED ORAL DAILY
Qty: 30 TABLET | Refills: 5 | Status: SHIPPED | OUTPATIENT
Start: 2020-12-01 | End: 2021-05-30 | Stop reason: SDUPTHER

## 2020-12-01 NOTE — PROGRESS NOTES
Digital Medicine: Clinician Follow-Up    Called patient for digital medicine follow up. He is doing well. Complains of chest tenderness that started almost 1 month ago. Discussed with PCP and no other correlation except for increased dose of spironolactone. Eplerenone recommended and patient is ok with medication change. Cancelled endocrinology visit but plans to reschedule for next week.     The history is provided by the patient.      Review of patient's allergies indicates:   -- Ace inhibitors -- Rash  Follow-up reason(s): routine follow up.     Hypertension    Patient's blood pressure readings are labile.         Last 5 Patient Entered Readings                                      Current 30 Day Average: 158/77     Recent Readings 11/27/2020 11/18/2020 11/16/2020 11/15/2020 11/15/2020    SBP (mmHg) 164 143 162 130 145    DBP (mmHg) 85 70 78 82 92    Pulse 79 80 74 106 101                 Depression Screening  Did not address depression screening.    Sleep Apnea Screening    Did not address sleep apnea screening.     Medication Affordability Screening  Did not address medication affordability screening.     Medication Adherence-Medication adherence was assessed.          ASSESSMENT(S)  Patients BP average is 158/77 mmHg, which is above goal. Patient's BP goal is less than or equal to 130/80.     Blood pressure slightly lower in clinic (140/72 mmHg).       Hypertension Plan  Hypertension Medication Change. Change spironolactone to eplerenone and continue other medications as prescribed.  Provided patient education. Reviewed technique for checking blood pressure.   Possible that insurance will deny eplerenone so may need to reduce spironolactone to 50 mg as side effect is dose dependent.  Encouraged patient to reschedule appointment with endocrinology.      Addressed patient questions and patient has my contact information if needed prior to next outreach. Patient verbalizes understanding.             There are no  preventive care reminders to display for this patient.  There are no preventive care reminders to display for this patient.      Hypertension Medications             carvediloL (COREG) 25 MG tablet Take 2 tablets (50 mg total) by mouth 2 (two) times daily with meals.    doxazosin (CARDURA) 4 MG tablet Take 1 tablet (4 mg total) by mouth every evening.    eplerenone (INSPRA) 50 MG Tab Take 1 tablet (50 mg total) by mouth once daily.    irbesartan (AVAPRO) 300 MG tablet Take 1 tablet (300 mg total) by mouth every evening.

## 2020-12-03 ENCOUNTER — PATIENT OUTREACH (OUTPATIENT)
Dept: OTHER | Facility: OTHER | Age: 60
End: 2020-12-03

## 2020-12-03 NOTE — PROGRESS NOTES
Patient left message asking if new prescription was sent to pharmacy as he did not receive a text message. Called patient to inform him that prescription was sent over. He will contact his pharmacy and give me a call if there are problems.

## 2020-12-04 DIAGNOSIS — Z12.11 COLON CANCER SCREENING: ICD-10-CM

## 2020-12-09 ENCOUNTER — PATIENT OUTREACH (OUTPATIENT)
Dept: OTHER | Facility: OTHER | Age: 60
End: 2020-12-09

## 2020-12-09 NOTE — PROGRESS NOTES
Left message for call back regarding increased dose of carvedilol. Patient replied via [employer] asking to communicate that way. Dr. Jenkins agree's with referral to endocrinology. Referral placed with Dr. Jenkins as co-signing provider.    [FreeTextEntry1] : prenatal restrictions and prenatal care reviewed with patient \par f/u 2 week for mat 21 \par 4 weeks visit and nuchal

## 2020-12-14 ENCOUNTER — PATIENT OUTREACH (OUTPATIENT)
Dept: ADMINISTRATIVE | Facility: HOSPITAL | Age: 60
End: 2020-12-14

## 2020-12-14 NOTE — PROGRESS NOTES
Outreach regarding Colorectal cancer Fitkit - Patient states he will complete it this week. Patient reminded to date specimen

## 2020-12-16 NOTE — PROGRESS NOTES
Digital Medicine: Clinician Follow-Up    Called patient for digital medicine follow up. He is doing well. Started eplerenone on Monday and is tolerating well so far. Blood pressure much closer to goal last time patient checked. He attributes lower reading to being fully relaxed. He was off work and home alone. Says that this does not happen often.     The history is provided by the patient.   Follow-up reason(s): medication change follow-up.     Hypertension    Patient's blood pressure readings are labile.   Patient did make medication change.    Is patient tolerating med change? yes            Last 5 Patient Entered Readings                                      Current 30 Day Average: 162/76     Recent Readings 12/12/2020 12/11/2020 12/8/2020 12/6/2020 12/6/2020    SBP (mmHg) 134 176 182 181 207    DBP (mmHg) 71 85 73 78 80    Pulse 82 74 82 77 76                 Depression Screening  Did not address depression screening.    Sleep Apnea Screening    Did not address sleep apnea screening.     Medication Affordability Screening  Did not address medication affordability screening.     Medication Adherence-Medication adherence was assessed.          ASSESSMENT(S)  Patients BP average is 162/76 mmHg, which is above goal. Patient's BP goal is less than or equal to 130/80.     Suspect that much higher readings are due to technique.       Hypertension Plan  Continue current therapy.  Provided patient education. Stressed the importance of relaxing prior to readings and checking after medications.        Addressed patient questions and patient has my contact information if needed prior to next outreach. Patient verbalizes understanding.             There are no preventive care reminders to display for this patient.  There are no preventive care reminders to display for this patient.      Hypertension Medications             carvediloL (COREG) 25 MG tablet Take 2 tablets (50 mg total) by mouth 2 (two) times daily with meals.     doxazosin (CARDURA) 4 MG tablet Take 1 tablet (4 mg total) by mouth every evening.    eplerenone (INSPRA) 50 MG Tab Take 1 tablet (50 mg total) by mouth once daily.    irbesartan (AVAPRO) 300 MG tablet Take 1 tablet (300 mg total) by mouth every evening.

## 2020-12-19 ENCOUNTER — PATIENT MESSAGE (OUTPATIENT)
Dept: CARDIOLOGY | Facility: CLINIC | Age: 60
End: 2020-12-19

## 2020-12-22 ENCOUNTER — TELEPHONE (OUTPATIENT)
Dept: CARDIOLOGY | Facility: CLINIC | Age: 60
End: 2020-12-22

## 2020-12-22 NOTE — TELEPHONE ENCOUNTER
I reached out to Mr. Gross, he stated the SOB  Is not waking him up from his sleep,    He gets SOB with exertion like when he takes a walk.    Patient made an appointment to see       1/2021.            NW                                  If his SOB is not worsening or occurring at rest then please offer him an appt with one of Dr. Jenkins's partners this week if available. If his SOB is worsening, occurring at rest or awakening him from sleep then he should go to the [facility]

## 2020-12-26 ENCOUNTER — PATIENT MESSAGE (OUTPATIENT)
Dept: ADMINISTRATIVE | Facility: OTHER | Age: 60
End: 2020-12-26

## 2020-12-26 ENCOUNTER — CLINICAL SUPPORT (OUTPATIENT)
Dept: URGENT CARE | Facility: CLINIC | Age: 60
End: 2020-12-26
Payer: MEDICAID

## 2020-12-26 VITALS — TEMPERATURE: 98 F

## 2020-12-26 DIAGNOSIS — Z03.818 ENCOUNTER FOR OBSERVATION FOR SUSPECTED EXPOSURE TO OTHER BIOLOGICAL AGENTS RULED OUT: ICD-10-CM

## 2020-12-26 DIAGNOSIS — Z78.9 NO KNOWN HEALTH PROBLEMS: Primary | ICD-10-CM

## 2020-12-26 PROCEDURE — U0003 INFECTIOUS AGENT DETECTION BY NUCLEIC ACID (DNA OR RNA); SEVERE ACUTE RESPIRATORY SYNDROME CORONAVIRUS 2 (SARS-COV-2) (CORONAVIRUS DISEASE [COVID-19]), AMPLIFIED PROBE TECHNIQUE, MAKING USE OF HIGH THROUGHPUT TECHNOLOGIES AS DESCRIBED BY CMS-2020-01-R: HCPCS

## 2020-12-26 PROCEDURE — 99211 PR OFFICE/OUTPT VISIT, EST, LEVL I: ICD-10-PCS | Mod: S$GLB,,, | Performed by: PHYSICIAN ASSISTANT

## 2020-12-26 PROCEDURE — 99211 OFF/OP EST MAY X REQ PHY/QHP: CPT | Mod: S$GLB,,, | Performed by: PHYSICIAN ASSISTANT

## 2020-12-27 ENCOUNTER — PATIENT MESSAGE (OUTPATIENT)
Dept: CARDIOLOGY | Facility: CLINIC | Age: 60
End: 2020-12-27

## 2020-12-27 ENCOUNTER — PATIENT MESSAGE (OUTPATIENT)
Dept: INTERNAL MEDICINE | Facility: CLINIC | Age: 60
End: 2020-12-27

## 2020-12-27 ENCOUNTER — NURSE TRIAGE (OUTPATIENT)
Dept: ADMINISTRATIVE | Facility: CLINIC | Age: 60
End: 2020-12-27

## 2020-12-27 DIAGNOSIS — U07.1 COVID-19 VIRUS DETECTED: ICD-10-CM

## 2020-12-27 LAB — SARS-COV-2 RNA RESP QL NAA+PROBE: DETECTED

## 2020-12-28 NOTE — TELEPHONE ENCOUNTER
Pt tested positive for COVID on 12/26/2020. Pt is calling because he is concerned as he has heart problems and hypertension. The patient denies fever. He does report that he has a cough and intermittent SOB. Pt advised to go to ED per triage protocol.     Reason for Disposition   MODERATE difficulty breathing (e.g., speaks in phrases, SOB even at rest, pulse 100-120)    Additional Information   Negative: SEVERE difficulty breathing (e.g., struggling for each breath, speaks in single words)   Negative: Difficult to awaken or acting confused (e.g., disoriented, slurred speech)   Negative: Shock suspected (e.g., cold/pale/clammy skin, too weak to stand, low BP, rapid pulse)   Negative: Bluish (or gray) lips or face now   Negative: Sounds like a life-threatening emergency to the triager   Negative: SEVERE or constant chest pain or pressure (Exception: mild central chest pain, present only when coughing)    Protocols used: CORONAVIRUS (COVID-19) DIAGNOSED OR [id number]

## 2020-12-28 NOTE — TELEPHONE ENCOUNTER
Spoke to pt. Who reported he is COVID positive. Informed pt to stay home and quarantine. He should monitor symptoms. He was also informed to go to the hospital if he develops any SOB. Pt verbalized an understanding.

## 2020-12-30 ENCOUNTER — PATIENT MESSAGE (OUTPATIENT)
Dept: ADMINISTRATIVE | Facility: OTHER | Age: 60
End: 2020-12-30

## 2021-01-02 ENCOUNTER — PATIENT MESSAGE (OUTPATIENT)
Dept: CARDIOLOGY | Facility: CLINIC | Age: 61
End: 2021-01-02

## 2021-01-05 ENCOUNTER — PATIENT MESSAGE (OUTPATIENT)
Dept: INTERNAL MEDICINE | Facility: CLINIC | Age: 61
End: 2021-01-05

## 2021-01-05 ENCOUNTER — PATIENT MESSAGE (OUTPATIENT)
Dept: ADMINISTRATIVE | Facility: HOSPITAL | Age: 61
End: 2021-01-05

## 2021-01-07 ENCOUNTER — PATIENT MESSAGE (OUTPATIENT)
Dept: INTERNAL MEDICINE | Facility: CLINIC | Age: 61
End: 2021-01-07

## 2021-01-07 DIAGNOSIS — R19.7 DIARRHEA, UNSPECIFIED TYPE: Primary | ICD-10-CM

## 2021-01-07 RX ORDER — DIPHENOXYLATE HYDROCHLORIDE AND ATROPINE SULFATE 2.5; .025 MG/1; MG/1
1 TABLET ORAL 4 TIMES DAILY PRN
Qty: 40 TABLET | Refills: 0 | Status: SHIPPED | OUTPATIENT
Start: 2021-01-07 | End: 2021-01-17

## 2021-01-19 ENCOUNTER — PATIENT MESSAGE (OUTPATIENT)
Dept: CARDIOLOGY | Facility: CLINIC | Age: 61
End: 2021-01-19

## 2021-01-27 ENCOUNTER — PATIENT OUTREACH (OUTPATIENT)
Dept: ADMINISTRATIVE | Facility: OTHER | Age: 61
End: 2021-01-27

## 2021-01-28 ENCOUNTER — OFFICE VISIT (OUTPATIENT)
Dept: CARDIOLOGY | Facility: CLINIC | Age: 61
End: 2021-01-28
Payer: MEDICAID

## 2021-01-28 VITALS
DIASTOLIC BLOOD PRESSURE: 74 MMHG | HEART RATE: 79 BPM | OXYGEN SATURATION: 98 % | BODY MASS INDEX: 28.12 KG/M2 | WEIGHT: 196 LBS | SYSTOLIC BLOOD PRESSURE: 126 MMHG

## 2021-01-28 DIAGNOSIS — I73.9 PAD (PERIPHERAL ARTERY DISEASE): ICD-10-CM

## 2021-01-28 DIAGNOSIS — I10 HYPERTENSION, UNSPECIFIED TYPE: ICD-10-CM

## 2021-01-28 DIAGNOSIS — I25.111 CORONARY ARTERY DISEASE INVOLVING NATIVE CORONARY ARTERY OF NATIVE HEART WITH ANGINA PECTORIS WITH DOCUMENTED SPASM: Primary | ICD-10-CM

## 2021-01-28 DIAGNOSIS — N18.30 STAGE 3 CHRONIC KIDNEY DISEASE, UNSPECIFIED WHETHER STAGE 3A OR 3B CKD: ICD-10-CM

## 2021-01-28 DIAGNOSIS — Z86.74 HISTORY OF CARDIAC ARREST: ICD-10-CM

## 2021-01-28 DIAGNOSIS — R06.02 SOB (SHORTNESS OF BREATH): ICD-10-CM

## 2021-01-28 DIAGNOSIS — E78.5 HYPERLIPIDEMIA, UNSPECIFIED HYPERLIPIDEMIA TYPE: ICD-10-CM

## 2021-01-28 PROCEDURE — 99213 OFFICE O/P EST LOW 20 MIN: CPT | Mod: PBBFAC,PO | Performed by: INTERNAL MEDICINE

## 2021-01-28 PROCEDURE — 99999 PR PBB SHADOW E&M-EST. PATIENT-LVL III: CPT | Mod: PBBFAC,,, | Performed by: INTERNAL MEDICINE

## 2021-01-28 PROCEDURE — 99215 OFFICE O/P EST HI 40 MIN: CPT | Mod: S$PBB,,, | Performed by: INTERNAL MEDICINE

## 2021-01-28 PROCEDURE — 99999 PR PBB SHADOW E&M-EST. PATIENT-LVL III: ICD-10-PCS | Mod: PBBFAC,,, | Performed by: INTERNAL MEDICINE

## 2021-01-28 PROCEDURE — 99215 PR OFFICE/OUTPT VISIT, EST, LEVL V, 40-54 MIN: ICD-10-PCS | Mod: S$PBB,,, | Performed by: INTERNAL MEDICINE

## 2021-01-29 ENCOUNTER — LAB VISIT (OUTPATIENT)
Dept: LAB | Facility: HOSPITAL | Age: 61
End: 2021-01-29
Attending: INTERNAL MEDICINE
Payer: MEDICAID

## 2021-01-29 DIAGNOSIS — E55.9 VITAMIN D DEFICIENCY: ICD-10-CM

## 2021-01-29 LAB — 25(OH)D3+25(OH)D2 SERPL-MCNC: 63 NG/ML (ref 30–96)

## 2021-01-29 PROCEDURE — 82306 VITAMIN D 25 HYDROXY: CPT

## 2021-01-29 PROCEDURE — 36415 COLL VENOUS BLD VENIPUNCTURE: CPT | Mod: PO

## 2021-02-04 ENCOUNTER — HOSPITAL ENCOUNTER (OUTPATIENT)
Dept: CARDIOLOGY | Facility: HOSPITAL | Age: 61
Discharge: HOME | End: 2021-02-04
Attending: INTERNAL MEDICINE
Payer: MEDICAID

## 2021-02-04 VITALS — HEIGHT: 70 IN | WEIGHT: 196 LBS | BODY MASS INDEX: 28.06 KG/M2

## 2021-02-04 DIAGNOSIS — R06.02 SOB (SHORTNESS OF BREATH): ICD-10-CM

## 2021-02-04 LAB
AORTIC ROOT ANNULUS: 4 CM
ASCENDING AORTA: 3.29 CM
AV INDEX (PROSTH): 0.82
AV MEAN GRADIENT: 3 MMHG
AV PEAK GRADIENT: 6 MMHG
AV VALVE AREA: 3.42 CM2
AV VELOCITY RATIO: 0.88
BSA FOR ECHO PROCEDURE: 2.1 M2
CV ECHO LV RWT: 0.33 CM
DOP CALC AO PEAK VEL: 1.18 M/S
DOP CALC AO VTI: 26.41 CM
DOP CALC LVOT AREA: 4.2 CM2
DOP CALC LVOT DIAMETER: 2.3 CM
DOP CALC LVOT PEAK VEL: 1.04 M/S
DOP CALC LVOT STROKE VOLUME: 90.24 CM3
DOP CALCLVOT PEAK VEL VTI: 21.73 CM
E WAVE DECELERATION TIME: 203.25 MSEC
E/A RATIO: 1.28
E/E' RATIO: 12.62 M/S
ECHO LV POSTERIOR WALL: 0.85 CM (ref 0.6–1.1)
FRACTIONAL SHORTENING: 36 % (ref 28–44)
INTERVENTRICULAR SEPTUM: 0.83 CM (ref 0.6–1.1)
IVRT: 97.05 MSEC
LA MAJOR: 5.84 CM
LA MINOR: 6.01 CM
LA WIDTH: 4.53 CM
LEFT ATRIUM SIZE: 4.23 CM
LEFT ATRIUM VOLUME INDEX MOD: 34.5 ML/M2
LEFT ATRIUM VOLUME INDEX: 46.6 ML/M2
LEFT ATRIUM VOLUME MOD: 71.47 CM3
LEFT ATRIUM VOLUME: 96.48 CM3
LEFT INTERNAL DIMENSION IN SYSTOLE: 3.27 CM (ref 2.1–4)
LEFT VENTRICLE DIASTOLIC VOLUME INDEX: 60.34 ML/M2
LEFT VENTRICLE DIASTOLIC VOLUME: 124.9 ML
LEFT VENTRICLE MASS INDEX: 73 G/M2
LEFT VENTRICLE SYSTOLIC VOLUME INDEX: 20.9 ML/M2
LEFT VENTRICLE SYSTOLIC VOLUME: 43.23 ML
LEFT VENTRICULAR INTERNAL DIMENSION IN DIASTOLE: 5.12 CM (ref 3.5–6)
LEFT VENTRICULAR MASS: 150.54 G
LV LATERAL E/E' RATIO: 10.25 M/S
LV SEPTAL E/E' RATIO: 16.4 M/S
MV A" WAVE DURATION": 13.7 MSEC
MV PEAK A VEL: 0.64 M/S
MV PEAK E VEL: 0.82 M/S
PULM VEIN S/D RATIO: 0.71
PV PEAK D VEL: 0.65 M/S
PV PEAK S VEL: 0.46 M/S
RA MAJOR: 4.53 CM
RA PRESSURE: 3 MMHG
RA WIDTH: 3.53 CM
RIGHT VENTRICULAR END-DIASTOLIC DIMENSION: 2.65 CM
RV TISSUE DOPPLER FREE WALL SYSTOLIC VELOCITY 1 (APICAL 4 CHAMBER VIEW): 12.87 CM/S
STJ: 3.38 CM
TDI LATERAL: 0.08 M/S
TDI SEPTAL: 0.05 M/S
TDI: 0.07 M/S
TRICUSPID ANNULAR PLANE SYSTOLIC EXCURSION: 2.2 CM

## 2021-02-04 PROCEDURE — 93306 ECHO (CUPID ONLY): ICD-10-PCS | Mod: 26,,, | Performed by: INTERNAL MEDICINE

## 2021-02-04 PROCEDURE — 93306 TTE W/DOPPLER COMPLETE: CPT

## 2021-02-04 PROCEDURE — 93306 TTE W/DOPPLER COMPLETE: CPT | Mod: 26,,, | Performed by: INTERNAL MEDICINE

## 2021-02-06 ENCOUNTER — PATIENT MESSAGE (OUTPATIENT)
Dept: CARDIOLOGY | Facility: CLINIC | Age: 61
End: 2021-02-06

## 2021-02-09 ENCOUNTER — PATIENT MESSAGE (OUTPATIENT)
Dept: CARDIOLOGY | Facility: CLINIC | Age: 61
End: 2021-02-09

## 2021-02-10 ENCOUNTER — TELEPHONE (OUTPATIENT)
Dept: CARDIOLOGY | Facility: CLINIC | Age: 61
End: 2021-02-10

## 2021-02-18 ENCOUNTER — PATIENT MESSAGE (OUTPATIENT)
Dept: INTERNAL MEDICINE | Facility: CLINIC | Age: 61
End: 2021-02-18

## 2021-02-22 ENCOUNTER — PATIENT MESSAGE (OUTPATIENT)
Dept: INTERNAL MEDICINE | Facility: CLINIC | Age: 61
End: 2021-02-22

## 2021-02-24 DIAGNOSIS — I10 ESSENTIAL HYPERTENSION: ICD-10-CM

## 2021-02-25 NOTE — TELEPHONE ENCOUNTER
Mr. Gross ,    I belive  was trying to get a hold of you.     regarding your legs getting some swelling.    Per  stated -  It may be from the Amlodipine,  And you can stop it for now.    darby jeffries.                                It is likely from amlodipine       You can stop it for now         Thanks       ZN      This Patient Portal message has not been read.                       ----- Message from Nina Hayden sent at 3/12/2020  2:05 PM CDT -----  Contact: Self [phone]  Patient is returning your call.  Please advise.       PHYSICAL THERAPY EVALUATION - INPATIENT     Room Number: 462/462-A  Evaluation Date: 2/25/2021  Type of Evaluation: Initial   Physician Order: PT Eval and Treat    Presenting Problem: back pain s/p epidural injection  Reason for Therapy: Mobility Dysfunct living environment upon D/C from the hospital. Anticipate patient will benefit from continued skilled physical therapy while in the hospital and upon D/C from the hospital with home health.  The patient's Approx Degree of Impairment: 50.57% has been calcula • TRANSFORAMINAL LUMBAR EPIDURAL STEROID INJECTION MULTIPLE LEVEL N/A 2/24/2021    Performed by Maureen Maher MD at [facility] OR   • YAG CAPSULOTOMY - OD - RIGHT EYE Right 6/6/15    DOMITILA   • YAG CAPSULOTOMY - OS - LEFT EYE Left 06/16/2015    DOMITILA       H Score:  Raw Score: 17   Approx Degree of Impairment: 50.57%   Standardized Score (AM-PAC Scale): 42.13   CMS Modifier (G-Code): CK    FUNCTIONAL ABILITY STATUS  Gait Assessment   Gait Assistance: Minimum assistance  Distance (ft): 15ft x 1 5ft x 1  Assisti

## 2021-03-01 RX ORDER — CARVEDILOL 25 MG/1
50 TABLET ORAL 2 TIMES DAILY WITH MEALS
Qty: 360 TABLET | Refills: 3 | Status: SHIPPED | OUTPATIENT
Start: 2021-03-01 | End: 2021-12-09 | Stop reason: SDUPTHER

## 2021-03-02 DIAGNOSIS — I10 ESSENTIAL HYPERTENSION: ICD-10-CM

## 2021-03-03 RX ORDER — EPLERENONE 50 MG/1
50 TABLET, FILM COATED ORAL DAILY
Qty: 30 TABLET | Refills: 8 | Status: CANCELLED | OUTPATIENT
Start: 2021-03-03 | End: 2022-03-03

## 2021-03-11 ENCOUNTER — PATIENT OUTREACH (OUTPATIENT)
Dept: ADMINISTRATIVE | Facility: OTHER | Age: 61
End: 2021-03-11

## 2021-03-15 ENCOUNTER — LAB VISIT (OUTPATIENT)
Dept: LAB | Facility: HOSPITAL | Age: 61
End: 2021-03-15
Attending: INTERNAL MEDICINE
Payer: MEDICAID

## 2021-03-15 ENCOUNTER — OFFICE VISIT (OUTPATIENT)
Dept: CARDIOLOGY | Facility: CLINIC | Age: 61
End: 2021-03-15
Payer: MEDICAID

## 2021-03-15 VITALS
HEART RATE: 82 BPM | OXYGEN SATURATION: 97 % | WEIGHT: 202.94 LBS | SYSTOLIC BLOOD PRESSURE: 134 MMHG | DIASTOLIC BLOOD PRESSURE: 76 MMHG | BODY MASS INDEX: 29.05 KG/M2 | HEIGHT: 70 IN

## 2021-03-15 DIAGNOSIS — I70.211 ATHEROSCLEROSIS OF NATIVE ARTERY OF RIGHT LOWER EXTREMITY WITH INTERMITTENT CLAUDICATION: ICD-10-CM

## 2021-03-15 DIAGNOSIS — N18.32 STAGE 3B CHRONIC KIDNEY DISEASE: ICD-10-CM

## 2021-03-15 DIAGNOSIS — E78.2 MIXED HYPERLIPIDEMIA: ICD-10-CM

## 2021-03-15 DIAGNOSIS — M47.26 OSTEOARTHRITIS OF SPINE WITH RADICULOPATHY, LUMBAR REGION: ICD-10-CM

## 2021-03-15 DIAGNOSIS — I10 ESSENTIAL HYPERTENSION: ICD-10-CM

## 2021-03-15 DIAGNOSIS — I87.2 VENOUS INSUFFICIENCY OF BOTH LOWER EXTREMITIES: ICD-10-CM

## 2021-03-15 DIAGNOSIS — I12.9 HYPERTENSIVE KIDNEY DISEASE WITH STAGE 3 CHRONIC KIDNEY DISEASE, UNSPECIFIED WHETHER STAGE 3A OR 3B CKD: Primary | ICD-10-CM

## 2021-03-15 DIAGNOSIS — I46.9 CARDIAC ARREST: ICD-10-CM

## 2021-03-15 DIAGNOSIS — I70.213 ATHEROSCLEROSIS OF NATIVE ARTERY OF BOTH LOWER EXTREMITIES WITH INTERMITTENT CLAUDICATION: ICD-10-CM

## 2021-03-15 DIAGNOSIS — N18.30 HYPERTENSIVE KIDNEY DISEASE WITH STAGE 3 CHRONIC KIDNEY DISEASE, UNSPECIFIED WHETHER STAGE 3A OR 3B CKD: Primary | ICD-10-CM

## 2021-03-15 LAB
CHOLEST SERPL-MCNC: 162 MG/DL (ref 120–199)
CHOLEST/HDLC SERPL: 3.8 {RATIO} (ref 2–5)
HDLC SERPL-MCNC: 43 MG/DL (ref 40–75)
HDLC SERPL: 26.5 % (ref 20–50)
LDLC SERPL CALC-MCNC: 98 MG/DL (ref 63–159)
NONHDLC SERPL-MCNC: 119 MG/DL
TRIGL SERPL-MCNC: 105 MG/DL (ref 30–150)

## 2021-03-15 PROCEDURE — 99215 OFFICE O/P EST HI 40 MIN: CPT | Mod: S$PBB,,, | Performed by: INTERNAL MEDICINE

## 2021-03-15 PROCEDURE — 99999 PR PBB SHADOW E&M-EST. PATIENT-LVL IV: CPT | Mod: PBBFAC,,, | Performed by: INTERNAL MEDICINE

## 2021-03-15 PROCEDURE — 36415 COLL VENOUS BLD VENIPUNCTURE: CPT | Performed by: INTERNAL MEDICINE

## 2021-03-15 PROCEDURE — 99215 PR OFFICE/OUTPT VISIT, EST, LEVL V, 40-54 MIN: ICD-10-PCS | Mod: S$PBB,,, | Performed by: INTERNAL MEDICINE

## 2021-03-15 PROCEDURE — 99214 OFFICE O/P EST MOD 30 MIN: CPT | Mod: PBBFAC,PO | Performed by: INTERNAL MEDICINE

## 2021-03-15 PROCEDURE — 80061 LIPID PANEL: CPT | Performed by: INTERNAL MEDICINE

## 2021-03-15 PROCEDURE — 99999 PR PBB SHADOW E&M-EST. PATIENT-LVL IV: ICD-10-PCS | Mod: PBBFAC,,, | Performed by: INTERNAL MEDICINE

## 2021-03-18 ENCOUNTER — TELEPHONE (OUTPATIENT)
Dept: CARDIOLOGY | Facility: CLINIC | Age: 61
End: 2021-03-18

## 2021-03-18 RX ORDER — ROSUVASTATIN CALCIUM 40 MG/1
40 TABLET, COATED ORAL NIGHTLY
Qty: 90 TABLET | Refills: 3 | Status: SHIPPED | OUTPATIENT
Start: 2021-03-18 | End: 2022-04-06

## 2021-04-05 ENCOUNTER — PATIENT MESSAGE (OUTPATIENT)
Dept: ADMINISTRATIVE | Facility: HOSPITAL | Age: 61
End: 2021-04-05

## 2021-04-12 ENCOUNTER — PATIENT MESSAGE (OUTPATIENT)
Dept: RESEARCH | Facility: HOSPITAL | Age: 61
End: 2021-04-12

## 2021-04-27 ENCOUNTER — PATIENT MESSAGE (OUTPATIENT)
Dept: CARDIOLOGY | Facility: CLINIC | Age: 61
End: 2021-04-27

## 2021-05-03 ENCOUNTER — PATIENT MESSAGE (OUTPATIENT)
Dept: CARDIOLOGY | Facility: CLINIC | Age: 61
End: 2021-05-03

## 2021-05-18 ENCOUNTER — PATIENT MESSAGE (OUTPATIENT)
Dept: INTERNAL MEDICINE | Facility: CLINIC | Age: 61
End: 2021-05-18

## 2021-05-18 ENCOUNTER — PATIENT MESSAGE (OUTPATIENT)
Dept: CARDIOLOGY | Facility: CLINIC | Age: 61
End: 2021-05-18

## 2021-05-19 ENCOUNTER — TELEPHONE (OUTPATIENT)
Dept: CARDIOLOGY | Facility: CLINIC | Age: 61
End: 2021-05-19

## 2021-05-30 DIAGNOSIS — I10 ESSENTIAL HYPERTENSION: ICD-10-CM

## 2021-05-30 RX ORDER — IRBESARTAN 300 MG/1
300 TABLET ORAL NIGHTLY
Qty: 90 TABLET | Refills: 3 | Status: CANCELLED | OUTPATIENT
Start: 2021-05-30 | End: 2022-05-30

## 2021-06-08 ENCOUNTER — PATIENT MESSAGE (OUTPATIENT)
Dept: INTERNAL MEDICINE | Facility: CLINIC | Age: 61
End: 2021-06-08

## 2021-06-08 RX ORDER — EPLERENONE 50 MG/1
50 TABLET, FILM COATED ORAL DAILY
Qty: 90 TABLET | Refills: 3 | Status: SHIPPED | OUTPATIENT
Start: 2021-06-08 | End: 2022-06-17 | Stop reason: SDUPTHER

## 2021-06-09 ENCOUNTER — PATIENT MESSAGE (OUTPATIENT)
Dept: INTERNAL MEDICINE | Facility: CLINIC | Age: 61
End: 2021-06-09

## 2021-06-14 ENCOUNTER — NURSE TRIAGE (OUTPATIENT)
Dept: ADMINISTRATIVE | Facility: CLINIC | Age: 61
End: 2021-06-14

## 2021-06-17 ENCOUNTER — PATIENT MESSAGE (OUTPATIENT)
Dept: ADMINISTRATIVE | Facility: OTHER | Age: 61
End: 2021-06-17

## 2021-06-21 ENCOUNTER — HOSPITAL ENCOUNTER (OUTPATIENT)
Dept: RADIOLOGY | Facility: HOSPITAL | Age: 61
Discharge: HOME | End: 2021-06-21
Attending: INTERNAL MEDICINE
Payer: MEDICAID

## 2021-06-21 ENCOUNTER — OFFICE VISIT (OUTPATIENT)
Dept: INTERNAL MEDICINE | Facility: CLINIC | Age: 61
End: 2021-06-21
Payer: MEDICAID

## 2021-06-21 ENCOUNTER — TELEPHONE (OUTPATIENT)
Dept: CARDIOLOGY | Facility: CLINIC | Age: 61
End: 2021-06-21

## 2021-06-21 VITALS
HEART RATE: 78 BPM | HEIGHT: 70 IN | WEIGHT: 185.44 LBS | BODY MASS INDEX: 26.55 KG/M2 | OXYGEN SATURATION: 98 % | SYSTOLIC BLOOD PRESSURE: 110 MMHG | DIASTOLIC BLOOD PRESSURE: 52 MMHG

## 2021-06-21 DIAGNOSIS — R06.09 DOE (DYSPNEA ON EXERTION): ICD-10-CM

## 2021-06-21 DIAGNOSIS — R53.83 FATIGUE, UNSPECIFIED TYPE: ICD-10-CM

## 2021-06-21 DIAGNOSIS — R06.02 SHORTNESS OF BREATH: ICD-10-CM

## 2021-06-21 DIAGNOSIS — R42 DIZZINESS: ICD-10-CM

## 2021-06-21 PROCEDURE — 99999 PR PBB SHADOW E&M-EST. PATIENT-LVL IV: CPT | Mod: PBBFAC,,, | Performed by: INTERNAL MEDICINE

## 2021-06-21 PROCEDURE — 93010 ELECTROCARDIOGRAM REPORT: CPT | Mod: S$PBB,,, | Performed by: INTERNAL MEDICINE

## 2021-06-21 PROCEDURE — 99214 OFFICE O/P EST MOD 30 MIN: CPT | Mod: PBBFAC,25,PO | Performed by: INTERNAL MEDICINE

## 2021-06-21 PROCEDURE — 71046 X-RAY EXAM CHEST 2 VIEWS: CPT | Mod: TC,FY,PO

## 2021-06-21 PROCEDURE — 93005 ELECTROCARDIOGRAM TRACING: CPT | Mod: PBBFAC,PO | Performed by: INTERNAL MEDICINE

## 2021-06-21 PROCEDURE — 99999 PR PBB SHADOW E&M-EST. PATIENT-LVL IV: ICD-10-PCS | Mod: PBBFAC,,, | Performed by: INTERNAL MEDICINE

## 2021-06-21 PROCEDURE — 71046 XR CHEST PA AND LATERAL: ICD-10-PCS | Mod: 26,,, | Performed by: RADIOLOGY

## 2021-06-21 PROCEDURE — 71046 X-RAY EXAM CHEST 2 VIEWS: CPT | Mod: 26,,, | Performed by: RADIOLOGY

## 2021-06-21 PROCEDURE — 93010 EKG 12-LEAD: ICD-10-PCS | Mod: S$PBB,,, | Performed by: INTERNAL MEDICINE

## 2021-06-21 PROCEDURE — 99214 PR OFFICE/OUTPT VISIT, EST, LEVL IV, 30-39 MIN: ICD-10-PCS | Mod: S$PBB,,, | Performed by: INTERNAL MEDICINE

## 2021-06-21 PROCEDURE — 99214 OFFICE O/P EST MOD 30 MIN: CPT | Mod: S$PBB,,, | Performed by: INTERNAL MEDICINE

## 2021-06-23 ENCOUNTER — TELEPHONE (OUTPATIENT)
Dept: CARDIOLOGY | Facility: CLINIC | Age: 61
End: 2021-06-23
Payer: MEDICAID

## 2021-06-30 ENCOUNTER — PATIENT MESSAGE (OUTPATIENT)
Dept: INTERNAL MEDICINE | Facility: CLINIC | Age: 61
End: 2021-06-30

## 2021-07-06 ENCOUNTER — PATIENT MESSAGE (OUTPATIENT)
Dept: ADMINISTRATIVE | Facility: HOSPITAL | Age: 61
End: 2021-07-06

## 2021-07-07 ENCOUNTER — PATIENT OUTREACH (OUTPATIENT)
Dept: ADMINISTRATIVE | Facility: OTHER | Age: 61
End: 2021-07-07

## 2021-07-07 DIAGNOSIS — Z12.11 ENCOUNTER FOR FIT (FECAL IMMUNOCHEMICAL TEST) SCREENING: Primary | ICD-10-CM

## 2021-07-08 ENCOUNTER — PATIENT OUTREACH (OUTPATIENT)
Dept: ADMINISTRATIVE | Facility: HOSPITAL | Age: 61
End: 2021-07-08

## 2021-07-08 ENCOUNTER — OFFICE VISIT (OUTPATIENT)
Dept: CARDIOLOGY | Facility: CLINIC | Age: 61
End: 2021-07-08
Payer: MEDICAID

## 2021-07-08 VITALS
DIASTOLIC BLOOD PRESSURE: 53 MMHG | BODY MASS INDEX: 25.94 KG/M2 | HEART RATE: 52 BPM | SYSTOLIC BLOOD PRESSURE: 137 MMHG | WEIGHT: 180.75 LBS | OXYGEN SATURATION: 98 %

## 2021-07-08 DIAGNOSIS — E78.5 HYPERLIPIDEMIA, UNSPECIFIED HYPERLIPIDEMIA TYPE: ICD-10-CM

## 2021-07-08 DIAGNOSIS — R60.9 SWELLING: ICD-10-CM

## 2021-07-08 DIAGNOSIS — I73.9 PAD (PERIPHERAL ARTERY DISEASE): ICD-10-CM

## 2021-07-08 DIAGNOSIS — I25.111 CORONARY ARTERY DISEASE INVOLVING NATIVE CORONARY ARTERY OF NATIVE HEART WITH ANGINA PECTORIS WITH DOCUMENTED SPASM: Primary | ICD-10-CM

## 2021-07-08 DIAGNOSIS — I10 ESSENTIAL HYPERTENSION: ICD-10-CM

## 2021-07-08 PROCEDURE — 99999 PR PBB SHADOW E&M-EST. PATIENT-LVL III: ICD-10-PCS | Mod: PBBFAC,,, | Performed by: INTERNAL MEDICINE

## 2021-07-08 PROCEDURE — 99215 OFFICE O/P EST HI 40 MIN: CPT | Mod: S$PBB,,, | Performed by: INTERNAL MEDICINE

## 2021-07-08 PROCEDURE — 99999 PR PBB SHADOW E&M-EST. PATIENT-LVL III: CPT | Mod: PBBFAC,,, | Performed by: INTERNAL MEDICINE

## 2021-07-08 PROCEDURE — 99215 PR OFFICE/OUTPT VISIT, EST, LEVL V, 40-54 MIN: ICD-10-PCS | Mod: S$PBB,,, | Performed by: INTERNAL MEDICINE

## 2021-07-08 PROCEDURE — 99213 OFFICE O/P EST LOW 20 MIN: CPT | Mod: PBBFAC,PO | Performed by: INTERNAL MEDICINE

## 2021-07-10 ENCOUNTER — TELEPHONE (OUTPATIENT)
Dept: INTERNAL MEDICINE | Facility: CLINIC | Age: 61
End: 2021-07-10

## 2021-07-15 ENCOUNTER — HOSPITAL ENCOUNTER (INPATIENT)
Facility: HOSPITAL | Age: 61
LOS: 2 days | Discharge: HOME | DRG: 683 | End: 2021-07-17
Attending: EMERGENCY MEDICINE | Admitting: FAMILY MEDICINE
Payer: MEDICAID

## 2021-07-15 DIAGNOSIS — N17.0 ACUTE RENAL FAILURE WITH TUBULAR NECROSIS: ICD-10-CM

## 2021-07-15 DIAGNOSIS — N18.32 STAGE 3B CHRONIC KIDNEY DISEASE: ICD-10-CM

## 2021-07-15 DIAGNOSIS — N18.30 STAGE 3 CHRONIC KIDNEY DISEASE, UNSPECIFIED WHETHER STAGE 3A OR 3B CKD: ICD-10-CM

## 2021-07-15 DIAGNOSIS — R79.89 ELEVATED SERUM CREATININE: ICD-10-CM

## 2021-07-15 DIAGNOSIS — R53.83 FATIGUE, UNSPECIFIED TYPE: ICD-10-CM

## 2021-07-15 DIAGNOSIS — D64.9 SYMPTOMATIC ANEMIA: Primary | ICD-10-CM

## 2021-07-15 DIAGNOSIS — E87.6 HYPOKALEMIA: ICD-10-CM

## 2021-07-15 DIAGNOSIS — D64.9 LOW HEMOGLOBIN: ICD-10-CM

## 2021-07-15 DIAGNOSIS — N17.9 ACUTE RENAL FAILURE, UNSPECIFIED ACUTE RENAL FAILURE TYPE: ICD-10-CM

## 2021-07-15 LAB
ABO + RH BLD: NORMAL
ALBUMIN SERPL BCP-MCNC: 2.9 G/DL (ref 3.5–5.2)
ALBUMIN SERPL BCP-MCNC: 3.1 G/DL (ref 3.5–5.2)
ALP SERPL-CCNC: 61 U/L (ref 55–135)
ALP SERPL-CCNC: 62 U/L (ref 55–135)
ALT SERPL W/O P-5'-P-CCNC: 16 U/L (ref 10–44)
ALT SERPL W/O P-5'-P-CCNC: 17 U/L (ref 10–44)
ANION GAP SERPL CALC-SCNC: 11 MMOL/L (ref 8–16)
ANION GAP SERPL CALC-SCNC: 12 MMOL/L (ref 8–16)
APTT BLDCRRT: 24.8 SEC (ref 21–32)
AST SERPL-CCNC: 19 U/L (ref 10–40)
AST SERPL-CCNC: 20 U/L (ref 10–40)
BASOPHILS # BLD AUTO: 0.02 K/UL (ref 0–0.2)
BASOPHILS NFR BLD: 0.5 % (ref 0–1.9)
BILIRUB SERPL-MCNC: 0.6 MG/DL (ref 0.1–1)
BILIRUB SERPL-MCNC: 0.7 MG/DL (ref 0.1–1)
BLD GP AB SCN CELLS X3 SERPL QL: NORMAL
BUN SERPL-MCNC: 23 MG/DL (ref 6–20)
BUN SERPL-MCNC: 23 MG/DL (ref 6–20)
CALCIUM SERPL-MCNC: 8.3 MG/DL (ref 8.7–10.5)
CALCIUM SERPL-MCNC: 8.5 MG/DL (ref 8.7–10.5)
CHLORIDE SERPL-SCNC: 104 MMOL/L (ref 95–110)
CHLORIDE SERPL-SCNC: 105 MMOL/L (ref 95–110)
CO2 SERPL-SCNC: 23 MMOL/L (ref 23–29)
CO2 SERPL-SCNC: 24 MMOL/L (ref 23–29)
CREAT SERPL-MCNC: 3.1 MG/DL (ref 0.5–1.4)
CREAT SERPL-MCNC: 3.2 MG/DL (ref 0.5–1.4)
CTP QC/QA: YES
DIFFERENTIAL METHOD: ABNORMAL
EOSINOPHIL # BLD AUTO: 0.1 K/UL (ref 0–0.5)
EOSINOPHIL NFR BLD: 2.6 % (ref 0–8)
ERYTHROCYTE [DISTWIDTH] IN BLOOD BY AUTOMATED COUNT: 15.1 % (ref 11.5–14.5)
EST. GFR  (AFRICAN AMERICAN): 23 ML/MIN/1.73 M^2
EST. GFR  (AFRICAN AMERICAN): 24 ML/MIN/1.73 M^2
EST. GFR  (NON AFRICAN AMERICAN): 20 ML/MIN/1.73 M^2
EST. GFR  (NON AFRICAN AMERICAN): 21 ML/MIN/1.73 M^2
FERRITIN SERPL-MCNC: 70 NG/ML (ref 20–300)
GLUCOSE SERPL-MCNC: 110 MG/DL (ref 70–110)
GLUCOSE SERPL-MCNC: 117 MG/DL (ref 70–110)
HCT VFR BLD AUTO: 18.5 % (ref 40–54)
HGB BLD-MCNC: 6.2 G/DL (ref 14–18)
IMM GRANULOCYTES # BLD AUTO: 0.01 K/UL (ref 0–0.04)
IMM GRANULOCYTES NFR BLD AUTO: 0.3 % (ref 0–0.5)
INR PPP: 1.2 (ref 0.8–1.2)
LYMPHOCYTES # BLD AUTO: 1.4 K/UL (ref 1–4.8)
LYMPHOCYTES NFR BLD: 35 % (ref 18–48)
MAGNESIUM SERPL-MCNC: 1.6 MG/DL (ref 1.6–2.6)
MCH RBC QN AUTO: 28.4 PG (ref 27–31)
MCHC RBC AUTO-ENTMCNC: 33.5 G/DL (ref 32–36)
MCV RBC AUTO: 85 FL (ref 82–98)
MONOCYTES # BLD AUTO: 0.4 K/UL (ref 0.3–1)
MONOCYTES NFR BLD: 9.5 % (ref 4–15)
NEUTROPHILS # BLD AUTO: 2 K/UL (ref 1.8–7.7)
NEUTROPHILS NFR BLD: 52.1 % (ref 38–73)
NRBC BLD-RTO: 0 /100 WBC
OB PNL STL: NEGATIVE
PLATELET # BLD AUTO: 138 K/UL (ref 150–450)
PMV BLD AUTO: 10.3 FL (ref 9.2–12.9)
POTASSIUM SERPL-SCNC: 2.3 MMOL/L (ref 3.5–5.1)
POTASSIUM SERPL-SCNC: 2.4 MMOL/L (ref 3.5–5.1)
PROT SERPL-MCNC: 5.9 G/DL (ref 6–8.4)
PROT SERPL-MCNC: 6.3 G/DL (ref 6–8.4)
PROTHROMBIN TIME: 12.5 SEC (ref 9–12.5)
RBC # BLD AUTO: 2.18 M/UL (ref 4.6–6.2)
SARS-COV-2 RDRP RESP QL NAA+PROBE: NEGATIVE
SODIUM SERPL-SCNC: 139 MMOL/L (ref 136–145)
SODIUM SERPL-SCNC: 140 MMOL/L (ref 136–145)
WBC # BLD AUTO: 3.91 K/UL (ref 3.9–12.7)

## 2021-07-15 PROCEDURE — 86900 BLOOD TYPING SEROLOGIC ABO: CPT | Performed by: EMERGENCY MEDICINE

## 2021-07-15 PROCEDURE — 93010 EKG 12-LEAD: ICD-10-PCS | Mod: ,,, | Performed by: INTERNAL MEDICINE

## 2021-07-15 PROCEDURE — P9021 RED BLOOD CELLS UNIT: HCPCS | Performed by: EMERGENCY MEDICINE

## 2021-07-15 PROCEDURE — 82272 OCCULT BLD FECES 1-3 TESTS: CPT | Performed by: EMERGENCY MEDICINE

## 2021-07-15 PROCEDURE — 85610 PROTHROMBIN TIME: CPT | Performed by: EMERGENCY MEDICINE

## 2021-07-15 PROCEDURE — 36430 TRANSFUSION BLD/BLD COMPNT: CPT

## 2021-07-15 PROCEDURE — C9113 INJ PANTOPRAZOLE SODIUM, VIA: HCPCS | Performed by: PHYSICIAN ASSISTANT

## 2021-07-15 PROCEDURE — 25000003 PHARM REV CODE 250: Performed by: EMERGENCY MEDICINE

## 2021-07-15 PROCEDURE — G0378 HOSPITAL OBSERVATION PER HR: HCPCS

## 2021-07-15 PROCEDURE — 86920 COMPATIBILITY TEST SPIN: CPT | Performed by: EMERGENCY MEDICINE

## 2021-07-15 PROCEDURE — 93010 ELECTROCARDIOGRAM REPORT: CPT | Mod: ,,, | Performed by: INTERNAL MEDICINE

## 2021-07-15 PROCEDURE — 85730 THROMBOPLASTIN TIME PARTIAL: CPT | Performed by: EMERGENCY MEDICINE

## 2021-07-15 PROCEDURE — 80053 COMPREHEN METABOLIC PANEL: CPT | Mod: 91 | Performed by: EMERGENCY MEDICINE

## 2021-07-15 PROCEDURE — 93005 ELECTROCARDIOGRAM TRACING: CPT

## 2021-07-15 PROCEDURE — 25000003 PHARM REV CODE 250: Performed by: PHYSICIAN ASSISTANT

## 2021-07-15 PROCEDURE — 83540 ASSAY OF IRON: CPT | Performed by: EMERGENCY MEDICINE

## 2021-07-15 PROCEDURE — 80053 COMPREHEN METABOLIC PANEL: CPT | Performed by: PHYSICIAN ASSISTANT

## 2021-07-15 PROCEDURE — 96375 TX/PRO/DX INJ NEW DRUG ADDON: CPT

## 2021-07-15 PROCEDURE — 99291 CRITICAL CARE FIRST HOUR: CPT | Mod: 25

## 2021-07-15 PROCEDURE — 63600175 PHARM REV CODE 636 W HCPCS: Performed by: PHYSICIAN ASSISTANT

## 2021-07-15 PROCEDURE — 82728 ASSAY OF FERRITIN: CPT | Performed by: EMERGENCY MEDICINE

## 2021-07-15 PROCEDURE — 63600175 PHARM REV CODE 636 W HCPCS: Performed by: EMERGENCY MEDICINE

## 2021-07-15 PROCEDURE — 12000002 HC ACUTE/MED SURGE SEMI-PRIVATE ROOM

## 2021-07-15 PROCEDURE — 85025 COMPLETE CBC W/AUTO DIFF WBC: CPT | Mod: 91 | Performed by: EMERGENCY MEDICINE

## 2021-07-15 PROCEDURE — 96374 THER/PROPH/DIAG INJ IV PUSH: CPT

## 2021-07-15 PROCEDURE — U0002 COVID-19 LAB TEST NON-CDC: HCPCS | Performed by: EMERGENCY MEDICINE

## 2021-07-15 PROCEDURE — 83735 ASSAY OF MAGNESIUM: CPT | Performed by: EMERGENCY MEDICINE

## 2021-07-15 RX ORDER — HYDROCODONE BITARTRATE AND ACETAMINOPHEN 500; 5 MG/1; MG/1
TABLET ORAL
Status: DISCONTINUED | OUTPATIENT
Start: 2021-07-15 | End: 2021-07-17 | Stop reason: HOSPADM

## 2021-07-15 RX ORDER — DOXAZOSIN 1 MG/1
4 TABLET ORAL NIGHTLY
Status: DISCONTINUED | OUTPATIENT
Start: 2021-07-15 | End: 2021-07-17 | Stop reason: HOSPADM

## 2021-07-15 RX ORDER — LOSARTAN POTASSIUM 50 MG/1
50 TABLET ORAL DAILY
Refills: 1 | Status: DISCONTINUED | OUTPATIENT
Start: 2021-07-16 | End: 2021-07-16

## 2021-07-15 RX ORDER — POTASSIUM CHLORIDE 7.45 MG/ML
10 INJECTION INTRAVENOUS ONCE
Status: COMPLETED | OUTPATIENT
Start: 2021-07-15 | End: 2021-07-15

## 2021-07-15 RX ORDER — POTASSIUM CHLORIDE 20 MEQ/1
20 TABLET, EXTENDED RELEASE ORAL
Status: COMPLETED | OUTPATIENT
Start: 2021-07-15 | End: 2021-07-15

## 2021-07-15 RX ORDER — MAGNESIUM SULFATE HEPTAHYDRATE 40 MG/ML
2 INJECTION, SOLUTION INTRAVENOUS ONCE
Status: COMPLETED | OUTPATIENT
Start: 2021-07-15 | End: 2021-07-16

## 2021-07-15 RX ORDER — ATORVASTATIN CALCIUM 40 MG/1
40 TABLET, FILM COATED ORAL NIGHTLY
Status: DISCONTINUED | OUTPATIENT
Start: 2021-07-15 | End: 2021-07-17 | Stop reason: HOSPADM

## 2021-07-15 RX ORDER — METOPROLOL TARTRATE 25 MG/1
25 TABLET, FILM COATED ORAL 2 TIMES DAILY
Status: DISCONTINUED | OUTPATIENT
Start: 2021-07-15 | End: 2021-07-16

## 2021-07-15 RX ORDER — PANTOPRAZOLE SODIUM 40 MG/10ML
40 INJECTION, POWDER, LYOPHILIZED, FOR SOLUTION INTRAVENOUS 2 TIMES DAILY
Status: DISCONTINUED | OUTPATIENT
Start: 2021-07-15 | End: 2021-07-16

## 2021-07-15 RX ADMIN — POTASSIUM CHLORIDE 10 MEQ: 7.46 INJECTION, SOLUTION INTRAVENOUS at 11:07

## 2021-07-15 RX ADMIN — PANTOPRAZOLE SODIUM 40 MG: 40 INJECTION, POWDER, FOR SOLUTION INTRAVENOUS at 09:07

## 2021-07-15 RX ADMIN — POTASSIUM CHLORIDE 20 MEQ: 1500 TABLET, EXTENDED RELEASE ORAL at 09:07

## 2021-07-15 RX ADMIN — METOPROLOL TARTRATE 25 MG: 25 TABLET, FILM COATED ORAL at 09:07

## 2021-07-15 RX ADMIN — ATORVASTATIN CALCIUM 40 MG: 40 TABLET, FILM COATED ORAL at 09:07

## 2021-07-15 RX ADMIN — SODIUM CHLORIDE: 0.9 INJECTION, SOLUTION INTRAVENOUS at 09:07

## 2021-07-16 ENCOUNTER — TELEPHONE (OUTPATIENT)
Dept: NEPHROLOGY | Facility: CLINIC | Age: 61
End: 2021-07-16

## 2021-07-16 PROBLEM — I25.10 CORONARY ARTERY DISEASE INVOLVING NATIVE CORONARY ARTERY OF NATIVE HEART WITHOUT ANGINA PECTORIS: Status: ACTIVE | Noted: 2019-03-29

## 2021-07-16 LAB
ANION GAP SERPL CALC-SCNC: 10 MMOL/L (ref 8–16)
ANION GAP SERPL CALC-SCNC: 8 MMOL/L (ref 8–16)
BACTERIA #/AREA URNS HPF: ABNORMAL /HPF
BASOPHILS # BLD AUTO: 0.03 K/UL (ref 0–0.2)
BASOPHILS NFR BLD: 0.5 % (ref 0–1.9)
BASOPHILS NFR BLD: 0.6 % (ref 0–1.9)
BASOPHILS NFR BLD: 0.7 % (ref 0–1.9)
BASOPHILS NFR BLD: 0.8 % (ref 0–1.9)
BILIRUB UR QL STRIP: NEGATIVE
BLD PROD TYP BPU: NORMAL
BLD PROD TYP BPU: NORMAL
BLOOD UNIT EXPIRATION DATE: NORMAL
BLOOD UNIT EXPIRATION DATE: NORMAL
BLOOD UNIT TYPE CODE: 600
BLOOD UNIT TYPE CODE: 600
BLOOD UNIT TYPE: NORMAL
BLOOD UNIT TYPE: NORMAL
BUN SERPL-MCNC: 19 MG/DL (ref 6–20)
BUN SERPL-MCNC: 20 MG/DL (ref 6–20)
CALCIUM SERPL-MCNC: 8.1 MG/DL (ref 8.7–10.5)
CALCIUM SERPL-MCNC: 8.5 MG/DL (ref 8.7–10.5)
CHLORIDE SERPL-SCNC: 108 MMOL/L (ref 95–110)
CHLORIDE SERPL-SCNC: 110 MMOL/L (ref 95–110)
CLARITY UR: CLEAR
CO2 SERPL-SCNC: 22 MMOL/L (ref 23–29)
CO2 SERPL-SCNC: 24 MMOL/L (ref 23–29)
CODING SYSTEM: NORMAL
CODING SYSTEM: NORMAL
COLOR UR: YELLOW
CREAT SERPL-MCNC: 2.4 MG/DL (ref 0.5–1.4)
CREAT SERPL-MCNC: 2.6 MG/DL (ref 0.5–1.4)
CREAT UR-MCNC: 138.9 MG/DL (ref 23–375)
CREAT UR-MCNC: 138.9 MG/DL (ref 23–375)
DIFFERENTIAL METHOD: ABNORMAL
DISPENSE STATUS: NORMAL
DISPENSE STATUS: NORMAL
EOSINOPHIL # BLD AUTO: 0.1 K/UL (ref 0–0.5)
EOSINOPHIL NFR BLD: 1.4 % (ref 0–8)
EOSINOPHIL NFR BLD: 2.4 % (ref 0–8)
EOSINOPHIL NFR BLD: 2.4 % (ref 0–8)
EOSINOPHIL NFR BLD: 2.5 % (ref 0–8)
ERYTHROCYTE [DISTWIDTH] IN BLOOD BY AUTOMATED COUNT: 15 % (ref 11.5–14.5)
ERYTHROCYTE [DISTWIDTH] IN BLOOD BY AUTOMATED COUNT: 15.1 % (ref 11.5–14.5)
ERYTHROCYTE [DISTWIDTH] IN BLOOD BY AUTOMATED COUNT: 15.1 % (ref 11.5–14.5)
ERYTHROCYTE [DISTWIDTH] IN BLOOD BY AUTOMATED COUNT: 15.2 % (ref 11.5–14.5)
EST. GFR  (AFRICAN AMERICAN): 30 ML/MIN/1.73 M^2
EST. GFR  (AFRICAN AMERICAN): 33 ML/MIN/1.73 M^2
EST. GFR  (NON AFRICAN AMERICAN): 26 ML/MIN/1.73 M^2
EST. GFR  (NON AFRICAN AMERICAN): 28 ML/MIN/1.73 M^2
GLUCOSE SERPL-MCNC: 93 MG/DL (ref 70–110)
GLUCOSE SERPL-MCNC: 97 MG/DL (ref 70–110)
GLUCOSE UR QL STRIP: NEGATIVE
HCT VFR BLD AUTO: 22.7 % (ref 40–54)
HCT VFR BLD AUTO: 23.2 % (ref 40–54)
HCT VFR BLD AUTO: 24.7 % (ref 40–54)
HCT VFR BLD AUTO: 25.1 % (ref 40–54)
HGB BLD-MCNC: 7.7 G/DL (ref 14–18)
HGB BLD-MCNC: 7.7 G/DL (ref 14–18)
HGB BLD-MCNC: 8.3 G/DL (ref 14–18)
HGB BLD-MCNC: 8.5 G/DL (ref 14–18)
HGB UR QL STRIP: ABNORMAL
HYALINE CASTS #/AREA URNS LPF: 0 /LPF
IMM GRANULOCYTES # BLD AUTO: 0.01 K/UL (ref 0–0.04)
IMM GRANULOCYTES # BLD AUTO: 0.01 K/UL (ref 0–0.04)
IMM GRANULOCYTES # BLD AUTO: 0.02 K/UL (ref 0–0.04)
IMM GRANULOCYTES # BLD AUTO: 0.03 K/UL (ref 0–0.04)
IMM GRANULOCYTES NFR BLD AUTO: 0.2 % (ref 0–0.5)
IMM GRANULOCYTES NFR BLD AUTO: 0.3 % (ref 0–0.5)
IMM GRANULOCYTES NFR BLD AUTO: 0.4 % (ref 0–0.5)
IMM GRANULOCYTES NFR BLD AUTO: 0.5 % (ref 0–0.5)
IRON SERPL-MCNC: 59 UG/DL (ref 45–160)
KETONES UR QL STRIP: NEGATIVE
LEUKOCYTE ESTERASE UR QL STRIP: NEGATIVE
LYMPHOCYTES # BLD AUTO: 1.1 K/UL (ref 1–4.8)
LYMPHOCYTES # BLD AUTO: 1.2 K/UL (ref 1–4.8)
LYMPHOCYTES # BLD AUTO: 1.2 K/UL (ref 1–4.8)
LYMPHOCYTES # BLD AUTO: 1.4 K/UL (ref 1–4.8)
LYMPHOCYTES NFR BLD: 21.9 % (ref 18–48)
LYMPHOCYTES NFR BLD: 22.2 % (ref 18–48)
LYMPHOCYTES NFR BLD: 29.9 % (ref 18–48)
LYMPHOCYTES NFR BLD: 30 % (ref 18–48)
MAGNESIUM SERPL-MCNC: 2.1 MG/DL (ref 1.6–2.6)
MCH RBC QN AUTO: 28.3 PG (ref 27–31)
MCH RBC QN AUTO: 28.9 PG (ref 27–31)
MCH RBC QN AUTO: 29.2 PG (ref 27–31)
MCH RBC QN AUTO: 29.4 PG (ref 27–31)
MCHC RBC AUTO-ENTMCNC: 33.2 G/DL (ref 32–36)
MCHC RBC AUTO-ENTMCNC: 33.6 G/DL (ref 32–36)
MCHC RBC AUTO-ENTMCNC: 33.9 G/DL (ref 32–36)
MCHC RBC AUTO-ENTMCNC: 33.9 G/DL (ref 32–36)
MCV RBC AUTO: 85 FL (ref 82–98)
MCV RBC AUTO: 86 FL (ref 82–98)
MCV RBC AUTO: 86 FL (ref 82–98)
MCV RBC AUTO: 87 FL (ref 82–98)
MICROSCOPIC COMMENT: ABNORMAL
MONOCYTES # BLD AUTO: 0.4 K/UL (ref 0.3–1)
MONOCYTES # BLD AUTO: 0.4 K/UL (ref 0.3–1)
MONOCYTES # BLD AUTO: 0.5 K/UL (ref 0.3–1)
MONOCYTES # BLD AUTO: 0.6 K/UL (ref 0.3–1)
MONOCYTES NFR BLD: 10 % (ref 4–15)
MONOCYTES NFR BLD: 8.9 % (ref 4–15)
MONOCYTES NFR BLD: 9.1 % (ref 4–15)
MONOCYTES NFR BLD: 9.2 % (ref 4–15)
NEUTROPHILS # BLD AUTO: 2.3 K/UL (ref 1.8–7.7)
NEUTROPHILS # BLD AUTO: 2.4 K/UL (ref 1.8–7.7)
NEUTROPHILS # BLD AUTO: 3.3 K/UL (ref 1.8–7.7)
NEUTROPHILS # BLD AUTO: 4.2 K/UL (ref 1.8–7.7)
NEUTROPHILS NFR BLD: 56.4 % (ref 38–73)
NEUTROPHILS NFR BLD: 57.6 % (ref 38–73)
NEUTROPHILS NFR BLD: 65.5 % (ref 38–73)
NEUTROPHILS NFR BLD: 66.6 % (ref 38–73)
NITRITE UR QL STRIP: NEGATIVE
NRBC BLD-RTO: 0 /100 WBC
PH UR STRIP: 7 [PH] (ref 5–8)
PHOSPHATE SERPL-MCNC: 2.5 MG/DL (ref 2.7–4.5)
PLATELET # BLD AUTO: 132 K/UL (ref 150–450)
PLATELET # BLD AUTO: 133 K/UL (ref 150–450)
PLATELET # BLD AUTO: 138 K/UL (ref 150–450)
PLATELET # BLD AUTO: 139 K/UL (ref 150–450)
PMV BLD AUTO: 10 FL (ref 9.2–12.9)
PMV BLD AUTO: 10.3 FL (ref 9.2–12.9)
POTASSIUM SERPL-SCNC: 2.4 MMOL/L (ref 3.5–5.1)
POTASSIUM SERPL-SCNC: 3.2 MMOL/L (ref 3.5–5.1)
PROT UR QL STRIP: ABNORMAL
PROT UR-MCNC: 633 MG/DL (ref 0–15)
PROT/CREAT UR: 4.56 MG/G{CREAT} (ref 0–0.2)
RBC # BLD AUTO: 2.64 M/UL (ref 4.6–6.2)
RBC # BLD AUTO: 2.72 M/UL (ref 4.6–6.2)
RBC # BLD AUTO: 2.87 M/UL (ref 4.6–6.2)
RBC # BLD AUTO: 2.89 M/UL (ref 4.6–6.2)
RBC #/AREA URNS HPF: 5 /HPF (ref 0–4)
SATURATED IRON: 19 % (ref 20–50)
SODIUM SERPL-SCNC: 140 MMOL/L (ref 136–145)
SODIUM SERPL-SCNC: 142 MMOL/L (ref 136–145)
SODIUM UR-SCNC: 67 MMOL/L (ref 20–250)
SP GR UR STRIP: 1.02 (ref 1–1.03)
TOTAL IRON BINDING CAPACITY: 309 UG/DL (ref 250–450)
TRANS ERYTHROCYTES VOL PATIENT: NORMAL ML
TRANS ERYTHROCYTES VOL PATIENT: NORMAL ML
TRANSFERRIN SERPL-MCNC: 209 MG/DL (ref 200–375)
URN SPEC COLLECT METH UR: ABNORMAL
UROBILINOGEN UR STRIP-ACNC: ABNORMAL EU/DL
WBC # BLD AUTO: 4 K/UL (ref 3.9–12.7)
WBC # BLD AUTO: 4.11 K/UL (ref 3.9–12.7)
WBC # BLD AUTO: 5.08 K/UL (ref 3.9–12.7)
WBC # BLD AUTO: 6.25 K/UL (ref 3.9–12.7)
WBC #/AREA URNS HPF: 2 /HPF (ref 0–5)
YEAST URNS QL MICRO: ABNORMAL

## 2021-07-16 PROCEDURE — 86335 IMMUNFIX E-PHORSIS/URINE/CSF: CPT | Mod: 26,,, | Performed by: PATHOLOGY

## 2021-07-16 PROCEDURE — 99222 PR INITIAL HOSPITAL CARE,LEVL II: ICD-10-PCS | Mod: ,,, | Performed by: INTERNAL MEDICINE

## 2021-07-16 PROCEDURE — 36415 COLL VENOUS BLD VENIPUNCTURE: CPT | Performed by: PHYSICIAN ASSISTANT

## 2021-07-16 PROCEDURE — 99223 PR INITIAL HOSPITAL CARE,LEVL III: ICD-10-PCS | Mod: ,,, | Performed by: NURSE PRACTITIONER

## 2021-07-16 PROCEDURE — 86255 FLUORESCENT ANTIBODY SCREEN: CPT | Performed by: INTERNAL MEDICINE

## 2021-07-16 PROCEDURE — 86335 IMMUNFIX E-PHORSIS/URINE/CSF: CPT | Performed by: INTERNAL MEDICINE

## 2021-07-16 PROCEDURE — 80048 BASIC METABOLIC PNL TOTAL CA: CPT | Performed by: NURSE PRACTITIONER

## 2021-07-16 PROCEDURE — 25000003 PHARM REV CODE 250: Performed by: PHYSICIAN ASSISTANT

## 2021-07-16 PROCEDURE — 84300 ASSAY OF URINE SODIUM: CPT | Performed by: PHYSICIAN ASSISTANT

## 2021-07-16 PROCEDURE — 36415 COLL VENOUS BLD VENIPUNCTURE: CPT | Performed by: FAMILY MEDICINE

## 2021-07-16 PROCEDURE — 99222 1ST HOSP IP/OBS MODERATE 55: CPT | Mod: ,,, | Performed by: INTERNAL MEDICINE

## 2021-07-16 PROCEDURE — 80048 BASIC METABOLIC PNL TOTAL CA: CPT | Mod: 91 | Performed by: FAMILY MEDICINE

## 2021-07-16 PROCEDURE — 63600175 PHARM REV CODE 636 W HCPCS: Performed by: PHYSICIAN ASSISTANT

## 2021-07-16 PROCEDURE — 11000001 HC ACUTE MED/SURG PRIVATE ROOM

## 2021-07-16 PROCEDURE — 81000 URINALYSIS NONAUTO W/SCOPE: CPT | Performed by: INTERNAL MEDICINE

## 2021-07-16 PROCEDURE — 83735 ASSAY OF MAGNESIUM: CPT | Performed by: PHYSICIAN ASSISTANT

## 2021-07-16 PROCEDURE — 36415 COLL VENOUS BLD VENIPUNCTURE: CPT | Performed by: NURSE PRACTITIONER

## 2021-07-16 PROCEDURE — 86335 PATHOLOGIST INTERPRETATION UIFE: ICD-10-PCS | Mod: 26,,, | Performed by: PATHOLOGY

## 2021-07-16 PROCEDURE — 82570 ASSAY OF URINE CREATININE: CPT | Performed by: INTERNAL MEDICINE

## 2021-07-16 PROCEDURE — 84100 ASSAY OF PHOSPHORUS: CPT | Performed by: PHYSICIAN ASSISTANT

## 2021-07-16 PROCEDURE — P9021 RED BLOOD CELLS UNIT: HCPCS | Performed by: EMERGENCY MEDICINE

## 2021-07-16 PROCEDURE — 99223 1ST HOSP IP/OBS HIGH 75: CPT | Mod: ,,, | Performed by: NURSE PRACTITIONER

## 2021-07-16 PROCEDURE — 25000003 PHARM REV CODE 250: Performed by: NURSE PRACTITIONER

## 2021-07-16 PROCEDURE — 85025 COMPLETE CBC W/AUTO DIFF WBC: CPT | Mod: 91 | Performed by: PHYSICIAN ASSISTANT

## 2021-07-16 PROCEDURE — 36415 COLL VENOUS BLD VENIPUNCTURE: CPT | Performed by: INTERNAL MEDICINE

## 2021-07-16 PROCEDURE — 82043 UR ALBUMIN QUANTITATIVE: CPT | Performed by: INTERNAL MEDICINE

## 2021-07-16 PROCEDURE — C9113 INJ PANTOPRAZOLE SODIUM, VIA: HCPCS | Performed by: PHYSICIAN ASSISTANT

## 2021-07-16 RX ORDER — HYDRALAZINE HYDROCHLORIDE 25 MG/1
25 TABLET, FILM COATED ORAL EVERY 8 HOURS
Status: DISCONTINUED | OUTPATIENT
Start: 2021-07-16 | End: 2021-07-17

## 2021-07-16 RX ORDER — CARVEDILOL 25 MG/1
25 TABLET ORAL 2 TIMES DAILY WITH MEALS
Status: DISCONTINUED | OUTPATIENT
Start: 2021-07-16 | End: 2021-07-17 | Stop reason: HOSPADM

## 2021-07-16 RX ORDER — PANTOPRAZOLE SODIUM 40 MG/1
40 TABLET, DELAYED RELEASE ORAL DAILY
Status: DISCONTINUED | OUTPATIENT
Start: 2021-07-17 | End: 2021-07-17 | Stop reason: HOSPADM

## 2021-07-16 RX ORDER — POTASSIUM CHLORIDE 20 MEQ/1
40 TABLET, EXTENDED RELEASE ORAL
Status: COMPLETED | OUTPATIENT
Start: 2021-07-16 | End: 2021-07-16

## 2021-07-16 RX ADMIN — CARVEDILOL 25 MG: 25 TABLET, FILM COATED ORAL at 09:07

## 2021-07-16 RX ADMIN — HYDRALAZINE HYDROCHLORIDE 25 MG: 25 TABLET, FILM COATED ORAL at 09:07

## 2021-07-16 RX ADMIN — ATORVASTATIN CALCIUM 40 MG: 40 TABLET, FILM COATED ORAL at 09:07

## 2021-07-16 RX ADMIN — CARVEDILOL 25 MG: 25 TABLET, FILM COATED ORAL at 05:07

## 2021-07-16 RX ADMIN — POTASSIUM CHLORIDE 40 MEQ: 1500 TABLET, EXTENDED RELEASE ORAL at 09:07

## 2021-07-16 RX ADMIN — POTASSIUM CHLORIDE 40 MEQ: 1500 TABLET, EXTENDED RELEASE ORAL at 12:07

## 2021-07-16 RX ADMIN — DOXAZOSIN 4 MG: 2 TABLET ORAL at 09:07

## 2021-07-16 RX ADMIN — MAGNESIUM SULFATE IN WATER 2 G: 40 INJECTION, SOLUTION INTRAVENOUS at 12:07

## 2021-07-16 RX ADMIN — PANTOPRAZOLE SODIUM 40 MG: 40 INJECTION, POWDER, FOR SOLUTION INTRAVENOUS at 09:07

## 2021-07-16 RX ADMIN — HYDRALAZINE HYDROCHLORIDE 25 MG: 25 TABLET, FILM COATED ORAL at 12:07

## 2021-07-16 RX ADMIN — DOXAZOSIN 4 MG: 2 TABLET ORAL at 12:07

## 2021-07-17 VITALS
RESPIRATION RATE: 18 BRPM | DIASTOLIC BLOOD PRESSURE: 90 MMHG | OXYGEN SATURATION: 97 % | BODY MASS INDEX: 26.51 KG/M2 | TEMPERATURE: 98 F | HEIGHT: 70 IN | HEART RATE: 61 BPM | WEIGHT: 185.19 LBS | SYSTOLIC BLOOD PRESSURE: 187 MMHG

## 2021-07-17 LAB
ALBUMIN SERPL BCP-MCNC: 2.7 G/DL (ref 3.5–5.2)
ALBUMIN/CREAT UR: 2649.4 UG/MG (ref 0–30)
ANION GAP SERPL CALC-SCNC: 6 MMOL/L (ref 8–16)
ANION GAP SERPL CALC-SCNC: 7 MMOL/L (ref 8–16)
ANION GAP SERPL CALC-SCNC: 9 MMOL/L (ref 8–16)
BASOPHILS # BLD AUTO: 0.03 K/UL (ref 0–0.2)
BASOPHILS NFR BLD: 0.6 % (ref 0–1.9)
BUN SERPL-MCNC: 17 MG/DL (ref 6–20)
BUN SERPL-MCNC: 17 MG/DL (ref 6–20)
BUN SERPL-MCNC: 18 MG/DL (ref 6–20)
C3 SERPL-MCNC: 95 MG/DL (ref 50–180)
C4 SERPL-MCNC: 19 MG/DL (ref 11–44)
CALCIUM SERPL-MCNC: 8.3 MG/DL (ref 8.7–10.5)
CALCIUM SERPL-MCNC: 8.6 MG/DL (ref 8.7–10.5)
CALCIUM SERPL-MCNC: 8.7 MG/DL (ref 8.7–10.5)
CHLORIDE SERPL-SCNC: 108 MMOL/L (ref 95–110)
CHLORIDE SERPL-SCNC: 109 MMOL/L (ref 95–110)
CHLORIDE SERPL-SCNC: 110 MMOL/L (ref 95–110)
CO2 SERPL-SCNC: 22 MMOL/L (ref 23–29)
CO2 SERPL-SCNC: 24 MMOL/L (ref 23–29)
CO2 SERPL-SCNC: 25 MMOL/L (ref 23–29)
CREAT SERPL-MCNC: 1.8 MG/DL (ref 0.5–1.4)
CREAT SERPL-MCNC: 1.8 MG/DL (ref 0.5–1.4)
CREAT SERPL-MCNC: 2 MG/DL (ref 0.5–1.4)
CREAT UR-MCNC: 138.9 MG/DL (ref 23–375)
DIFFERENTIAL METHOD: ABNORMAL
EOSINOPHIL # BLD AUTO: 0.1 K/UL (ref 0–0.5)
EOSINOPHIL NFR BLD: 1.5 % (ref 0–8)
ERYTHROCYTE [DISTWIDTH] IN BLOOD BY AUTOMATED COUNT: 15.2 % (ref 11.5–14.5)
EST. GFR  (AFRICAN AMERICAN): 41 ML/MIN/1.73 M^2
EST. GFR  (AFRICAN AMERICAN): 46 ML/MIN/1.73 M^2
EST. GFR  (AFRICAN AMERICAN): 46 ML/MIN/1.73 M^2
EST. GFR  (NON AFRICAN AMERICAN): 35 ML/MIN/1.73 M^2
EST. GFR  (NON AFRICAN AMERICAN): 40 ML/MIN/1.73 M^2
EST. GFR  (NON AFRICAN AMERICAN): 40 ML/MIN/1.73 M^2
GLUCOSE SERPL-MCNC: 88 MG/DL (ref 70–110)
GLUCOSE SERPL-MCNC: 90 MG/DL (ref 70–110)
GLUCOSE SERPL-MCNC: 97 MG/DL (ref 70–110)
HCT VFR BLD AUTO: 24.4 % (ref 40–54)
HGB BLD-MCNC: 8.2 G/DL (ref 14–18)
IMM GRANULOCYTES # BLD AUTO: 0.01 K/UL (ref 0–0.04)
IMM GRANULOCYTES NFR BLD AUTO: 0.2 % (ref 0–0.5)
LYMPHOCYTES # BLD AUTO: 1.2 K/UL (ref 1–4.8)
LYMPHOCYTES NFR BLD: 22.7 % (ref 18–48)
MAGNESIUM SERPL-MCNC: 1.8 MG/DL (ref 1.6–2.6)
MCH RBC QN AUTO: 29.1 PG (ref 27–31)
MCHC RBC AUTO-ENTMCNC: 33.6 G/DL (ref 32–36)
MCV RBC AUTO: 87 FL (ref 82–98)
MICROALBUMIN UR DL<=1MG/L-MCNC: 3680 UG/ML
MONOCYTES # BLD AUTO: 0.5 K/UL (ref 0.3–1)
MONOCYTES NFR BLD: 8.8 % (ref 4–15)
NEUTROPHILS # BLD AUTO: 3.5 K/UL (ref 1.8–7.7)
NEUTROPHILS NFR BLD: 66.2 % (ref 38–73)
NRBC BLD-RTO: 0 /100 WBC
PHOSPHATE SERPL-MCNC: 2.2 MG/DL (ref 2.7–4.5)
PLATELET # BLD AUTO: 132 K/UL (ref 150–450)
PMV BLD AUTO: 10.1 FL (ref 9.2–12.9)
POTASSIUM SERPL-SCNC: 2.8 MMOL/L (ref 3.5–5.1)
POTASSIUM SERPL-SCNC: 2.9 MMOL/L (ref 3.5–5.1)
POTASSIUM SERPL-SCNC: 3.1 MMOL/L (ref 3.5–5.1)
RBC # BLD AUTO: 2.82 M/UL (ref 4.6–6.2)
SODIUM SERPL-SCNC: 139 MMOL/L (ref 136–145)
SODIUM SERPL-SCNC: 140 MMOL/L (ref 136–145)
SODIUM SERPL-SCNC: 141 MMOL/L (ref 136–145)
WBC # BLD AUTO: 5.32 K/UL (ref 3.9–12.7)

## 2021-07-17 PROCEDURE — 36415 COLL VENOUS BLD VENIPUNCTURE: CPT | Performed by: FAMILY MEDICINE

## 2021-07-17 PROCEDURE — 25000003 PHARM REV CODE 250: Performed by: FAMILY MEDICINE

## 2021-07-17 PROCEDURE — 86334 IMMUNOFIX E-PHORESIS SERUM: CPT | Mod: 26,,, | Performed by: PATHOLOGY

## 2021-07-17 PROCEDURE — 63600175 PHARM REV CODE 636 W HCPCS: Performed by: FAMILY MEDICINE

## 2021-07-17 PROCEDURE — 86160 COMPLEMENT ANTIGEN: CPT | Performed by: INTERNAL MEDICINE

## 2021-07-17 PROCEDURE — 80069 RENAL FUNCTION PANEL: CPT | Performed by: PHYSICIAN ASSISTANT

## 2021-07-17 PROCEDURE — 85025 COMPLETE CBC W/AUTO DIFF WBC: CPT | Performed by: PHYSICIAN ASSISTANT

## 2021-07-17 PROCEDURE — 86160 COMPLEMENT ANTIGEN: CPT | Mod: 59 | Performed by: INTERNAL MEDICINE

## 2021-07-17 PROCEDURE — 25000003 PHARM REV CODE 250: Performed by: NURSE PRACTITIONER

## 2021-07-17 PROCEDURE — 86334 PATHOLOGIST INTERPRETATION IFE: ICD-10-PCS | Mod: 26,,, | Performed by: PATHOLOGY

## 2021-07-17 PROCEDURE — 83735 ASSAY OF MAGNESIUM: CPT | Performed by: PHYSICIAN ASSISTANT

## 2021-07-17 PROCEDURE — 86038 ANTINUCLEAR ANTIBODIES: CPT | Performed by: INTERNAL MEDICINE

## 2021-07-17 PROCEDURE — 80048 BASIC METABOLIC PNL TOTAL CA: CPT | Performed by: FAMILY MEDICINE

## 2021-07-17 PROCEDURE — 86334 IMMUNOFIX E-PHORESIS SERUM: CPT | Performed by: INTERNAL MEDICINE

## 2021-07-17 PROCEDURE — 36415 COLL VENOUS BLD VENIPUNCTURE: CPT | Performed by: INTERNAL MEDICINE

## 2021-07-17 RX ORDER — NUT.TX.IMPAIRED RENAL FXN,SOY 0.09G-2/ML
1 LIQUID (ML) ORAL
Qty: 90 BOTTLE | Refills: 5 | Status: SHIPPED | OUTPATIENT
Start: 2021-07-17 | End: 2021-10-27

## 2021-07-17 RX ORDER — MAGNESIUM SULFATE HEPTAHYDRATE 40 MG/ML
2 INJECTION, SOLUTION INTRAVENOUS ONCE
Status: COMPLETED | OUTPATIENT
Start: 2021-07-17 | End: 2021-07-17

## 2021-07-17 RX ORDER — NAPROXEN SODIUM 220 MG/1
81 TABLET, FILM COATED ORAL DAILY
Status: DISCONTINUED | OUTPATIENT
Start: 2021-07-17 | End: 2021-07-17 | Stop reason: HOSPADM

## 2021-07-17 RX ORDER — HYDRALAZINE HYDROCHLORIDE 50 MG/1
50 TABLET, FILM COATED ORAL 3 TIMES DAILY
Qty: 90 TABLET | Refills: 11 | Status: SHIPPED | OUTPATIENT
Start: 2021-07-17 | End: 2021-07-22

## 2021-07-17 RX ORDER — POTASSIUM CHLORIDE 20 MEQ/1
20 TABLET, EXTENDED RELEASE ORAL DAILY
Qty: 7 TABLET | Refills: 0 | Status: SHIPPED | OUTPATIENT
Start: 2021-07-17 | End: 2021-07-22 | Stop reason: SDUPTHER

## 2021-07-17 RX ORDER — HYDRALAZINE HYDROCHLORIDE 25 MG/1
50 TABLET, FILM COATED ORAL EVERY 8 HOURS
Status: DISCONTINUED | OUTPATIENT
Start: 2021-07-17 | End: 2021-07-17 | Stop reason: HOSPADM

## 2021-07-17 RX ORDER — CLOPIDOGREL BISULFATE 75 MG/1
75 TABLET ORAL DAILY
Status: DISCONTINUED | OUTPATIENT
Start: 2021-07-17 | End: 2021-07-17 | Stop reason: HOSPADM

## 2021-07-17 RX ORDER — CLOPIDOGREL BISULFATE 75 MG/1
75 TABLET ORAL DAILY
Qty: 30 TABLET | Refills: 11 | Status: SHIPPED | OUTPATIENT
Start: 2021-07-18 | End: 2022-07-18 | Stop reason: SDUPTHER

## 2021-07-17 RX ORDER — CLOPIDOGREL BISULFATE 75 MG/1
75 TABLET ORAL DAILY
Qty: 30 TABLET | Refills: 11 | Status: CANCELLED | OUTPATIENT
Start: 2021-07-17 | End: 2022-07-17

## 2021-07-17 RX ORDER — POTASSIUM CHLORIDE 20 MEQ/1
40 TABLET, EXTENDED RELEASE ORAL
Status: COMPLETED | OUTPATIENT
Start: 2021-07-17 | End: 2021-07-17

## 2021-07-17 RX ADMIN — MAGNESIUM SULFATE IN WATER 2 G: 40 INJECTION, SOLUTION INTRAVENOUS at 10:07

## 2021-07-17 RX ADMIN — HYDRALAZINE HYDROCHLORIDE 25 MG: 25 TABLET, FILM COATED ORAL at 03:07

## 2021-07-17 RX ADMIN — HYDRALAZINE HYDROCHLORIDE 25 MG: 25 TABLET, FILM COATED ORAL at 05:07

## 2021-07-17 RX ADMIN — POTASSIUM CHLORIDE 40 MEQ: 1500 TABLET, EXTENDED RELEASE ORAL at 02:07

## 2021-07-17 RX ADMIN — ASPIRIN 81 MG CHEWABLE TABLET 81 MG: 81 TABLET CHEWABLE at 10:07

## 2021-07-17 RX ADMIN — CARVEDILOL 25 MG: 25 TABLET, FILM COATED ORAL at 05:07

## 2021-07-17 RX ADMIN — HYDRALAZINE HYDROCHLORIDE 25 MG: 25 TABLET, FILM COATED ORAL at 01:07

## 2021-07-17 RX ADMIN — PANTOPRAZOLE SODIUM 40 MG: 40 TABLET, DELAYED RELEASE ORAL at 09:07

## 2021-07-17 RX ADMIN — CLOPIDOGREL 75 MG: 75 TABLET, FILM COATED ORAL at 10:07

## 2021-07-17 RX ADMIN — POTASSIUM CHLORIDE 40 MEQ: 1500 TABLET, EXTENDED RELEASE ORAL at 12:07

## 2021-07-17 RX ADMIN — POTASSIUM CHLORIDE 40 MEQ: 1500 TABLET, EXTENDED RELEASE ORAL at 01:07

## 2021-07-17 RX ADMIN — CARVEDILOL 25 MG: 25 TABLET, FILM COATED ORAL at 09:07

## 2021-07-19 LAB
ANA SER QL IF: NORMAL
ANCA AB TITR SER IF: NORMAL TITER
INTERPRETATION SERPL IFE-IMP: NORMAL
INTERPRETATION UR IFE-IMP: NORMAL
P-ANCA TITR SER IF: NORMAL TITER
PATHOLOGIST INTERPRETATION IFE: NORMAL
PATHOLOGIST INTERPRETATION UIFE: NORMAL

## 2021-07-22 ENCOUNTER — OFFICE VISIT (OUTPATIENT)
Dept: INTERNAL MEDICINE | Facility: CLINIC | Age: 61
End: 2021-07-22
Payer: MEDICAID

## 2021-07-22 DIAGNOSIS — D62 ACUTE BLOOD LOSS ANEMIA: ICD-10-CM

## 2021-07-22 DIAGNOSIS — E87.6 HYPOKALEMIA: ICD-10-CM

## 2021-07-22 DIAGNOSIS — E83.42 HYPOMAGNESEMIA: ICD-10-CM

## 2021-07-22 DIAGNOSIS — I70.212 ATHEROSCLEROSIS OF NATIVE ARTERY OF LEFT LOWER EXTREMITY WITH INTERMITTENT CLAUDICATION: ICD-10-CM

## 2021-07-22 DIAGNOSIS — I25.10 CORONARY ARTERY DISEASE INVOLVING NATIVE CORONARY ARTERY OF NATIVE HEART WITHOUT ANGINA PECTORIS: ICD-10-CM

## 2021-07-22 DIAGNOSIS — I10 ESSENTIAL HYPERTENSION: ICD-10-CM

## 2021-07-22 PROCEDURE — 99999 PR PBB SHADOW E&M-EST. PATIENT-LVL III: ICD-10-PCS | Mod: PBBFAC,,, | Performed by: INTERNAL MEDICINE

## 2021-07-22 PROCEDURE — 99214 OFFICE O/P EST MOD 30 MIN: CPT | Mod: S$PBB,,, | Performed by: INTERNAL MEDICINE

## 2021-07-22 PROCEDURE — 99214 PR OFFICE/OUTPT VISIT, EST, LEVL IV, 30-39 MIN: ICD-10-PCS | Mod: S$PBB,,, | Performed by: INTERNAL MEDICINE

## 2021-07-22 PROCEDURE — 99999 PR PBB SHADOW E&M-EST. PATIENT-LVL III: CPT | Mod: PBBFAC,,, | Performed by: INTERNAL MEDICINE

## 2021-07-22 PROCEDURE — 99213 OFFICE O/P EST LOW 20 MIN: CPT | Mod: PBBFAC,PO | Performed by: INTERNAL MEDICINE

## 2021-07-22 RX ORDER — POTASSIUM CHLORIDE 20 MEQ/1
20 TABLET, EXTENDED RELEASE ORAL DAILY
Qty: 90 TABLET | Refills: 3 | Status: SHIPPED | OUTPATIENT
Start: 2021-07-22 | End: 2021-07-30 | Stop reason: SDUPTHER

## 2021-07-22 RX ORDER — HYDRALAZINE HYDROCHLORIDE 100 MG/1
100 TABLET, FILM COATED ORAL 2 TIMES DAILY
Qty: 180 TABLET | Refills: 1 | Status: SHIPPED | OUTPATIENT
Start: 2021-07-22 | End: 2021-12-09 | Stop reason: SDUPTHER

## 2021-07-28 ENCOUNTER — HOSPITAL ENCOUNTER (OUTPATIENT)
Dept: RADIOLOGY | Facility: HOSPITAL | Age: 61
Discharge: HOME | End: 2021-07-28
Attending: INTERNAL MEDICINE
Payer: MEDICAID

## 2021-07-28 ENCOUNTER — PATIENT MESSAGE (OUTPATIENT)
Dept: CARDIOLOGY | Facility: CLINIC | Age: 61
End: 2021-07-28

## 2021-07-28 DIAGNOSIS — R60.9 SWELLING: ICD-10-CM

## 2021-07-28 DIAGNOSIS — I73.9 PAD (PERIPHERAL ARTERY DISEASE): ICD-10-CM

## 2021-07-28 PROCEDURE — 93970 US LOWER EXTREMITY VEINS BILATERAL INSUFFICIENCY: ICD-10-PCS | Mod: 26,,, | Performed by: INTERNAL MEDICINE

## 2021-07-28 PROCEDURE — 93925 LOWER EXTREMITY STUDY: CPT | Mod: TC

## 2021-07-28 PROCEDURE — 93970 EXTREMITY STUDY: CPT | Mod: TC

## 2021-07-28 PROCEDURE — 93925 LOWER EXTREMITY STUDY: CPT | Mod: 26,,, | Performed by: RADIOLOGY

## 2021-07-28 PROCEDURE — 93925 US LOWER EXTREMITY ARTERIES BILATERAL: ICD-10-PCS | Mod: 26,,, | Performed by: RADIOLOGY

## 2021-07-28 PROCEDURE — 93970 EXTREMITY STUDY: CPT | Mod: 26,,, | Performed by: INTERNAL MEDICINE

## 2021-07-29 ENCOUNTER — OFFICE VISIT (OUTPATIENT)
Dept: CARDIOLOGY | Facility: CLINIC | Age: 61
End: 2021-07-29
Payer: MEDICAID

## 2021-07-29 ENCOUNTER — LAB VISIT (OUTPATIENT)
Dept: LAB | Facility: HOSPITAL | Age: 61
End: 2021-07-29
Attending: INTERNAL MEDICINE
Payer: MEDICAID

## 2021-07-29 ENCOUNTER — TELEPHONE (OUTPATIENT)
Dept: CARDIOLOGY | Facility: CLINIC | Age: 61
End: 2021-07-29

## 2021-07-29 ENCOUNTER — PATIENT MESSAGE (OUTPATIENT)
Dept: CARDIOLOGY | Facility: CLINIC | Age: 61
End: 2021-07-29

## 2021-07-29 VITALS
HEART RATE: 65 BPM | OXYGEN SATURATION: 98 % | DIASTOLIC BLOOD PRESSURE: 85 MMHG | SYSTOLIC BLOOD PRESSURE: 179 MMHG | BODY MASS INDEX: 26.26 KG/M2 | WEIGHT: 183 LBS

## 2021-07-29 DIAGNOSIS — E78.5 HYPERLIPIDEMIA, UNSPECIFIED HYPERLIPIDEMIA TYPE: ICD-10-CM

## 2021-07-29 DIAGNOSIS — I25.111 CORONARY ARTERY DISEASE INVOLVING NATIVE CORONARY ARTERY OF NATIVE HEART WITH ANGINA PECTORIS WITH DOCUMENTED SPASM: Primary | ICD-10-CM

## 2021-07-29 DIAGNOSIS — I73.9 PAD (PERIPHERAL ARTERY DISEASE): ICD-10-CM

## 2021-07-29 DIAGNOSIS — I25.111 CORONARY ARTERY DISEASE INVOLVING NATIVE CORONARY ARTERY OF NATIVE HEART WITH ANGINA PECTORIS WITH DOCUMENTED SPASM: ICD-10-CM

## 2021-07-29 DIAGNOSIS — I10 HYPERTENSION, UNSPECIFIED TYPE: ICD-10-CM

## 2021-07-29 LAB
CREAT SERPL-MCNC: 2 MG/DL (ref 0.5–1.4)
EST. GFR  (AFRICAN AMERICAN): 41 ML/MIN/1.73 M^2
EST. GFR  (NON AFRICAN AMERICAN): 35 ML/MIN/1.73 M^2
HCT VFR BLD AUTO: 25.2 % (ref 40–54)
HGB BLD-MCNC: 8.5 G/DL (ref 14–18)
POTASSIUM SERPL-SCNC: 3.1 MMOL/L (ref 3.5–5.1)

## 2021-07-29 PROCEDURE — 99999 PR PBB SHADOW E&M-EST. PATIENT-LVL III: CPT | Mod: PBBFAC,,, | Performed by: INTERNAL MEDICINE

## 2021-07-29 PROCEDURE — 99999 PR PBB SHADOW E&M-EST. PATIENT-LVL III: ICD-10-PCS | Mod: PBBFAC,,, | Performed by: INTERNAL MEDICINE

## 2021-07-29 PROCEDURE — 82565 ASSAY OF CREATININE: CPT | Performed by: INTERNAL MEDICINE

## 2021-07-29 PROCEDURE — 84132 ASSAY OF SERUM POTASSIUM: CPT | Performed by: INTERNAL MEDICINE

## 2021-07-29 PROCEDURE — 85014 HEMATOCRIT: CPT | Performed by: INTERNAL MEDICINE

## 2021-07-29 PROCEDURE — 93010 EKG 12-LEAD: ICD-10-PCS | Mod: S$PBB,,, | Performed by: INTERNAL MEDICINE

## 2021-07-29 PROCEDURE — 99215 OFFICE O/P EST HI 40 MIN: CPT | Mod: 25,S$PBB,, | Performed by: INTERNAL MEDICINE

## 2021-07-29 PROCEDURE — 99213 OFFICE O/P EST LOW 20 MIN: CPT | Mod: PBBFAC,PO | Performed by: INTERNAL MEDICINE

## 2021-07-29 PROCEDURE — 99215 PR OFFICE/OUTPT VISIT, EST, LEVL V, 40-54 MIN: ICD-10-PCS | Mod: 25,S$PBB,, | Performed by: INTERNAL MEDICINE

## 2021-07-29 PROCEDURE — 93005 ELECTROCARDIOGRAM TRACING: CPT | Mod: PBBFAC,PO | Performed by: INTERNAL MEDICINE

## 2021-07-29 PROCEDURE — 85018 HEMOGLOBIN: CPT | Performed by: INTERNAL MEDICINE

## 2021-07-29 PROCEDURE — 36415 COLL VENOUS BLD VENIPUNCTURE: CPT | Performed by: INTERNAL MEDICINE

## 2021-07-29 PROCEDURE — 93010 ELECTROCARDIOGRAM REPORT: CPT | Mod: S$PBB,,, | Performed by: INTERNAL MEDICINE

## 2021-07-30 ENCOUNTER — PATIENT MESSAGE (OUTPATIENT)
Dept: CARDIOLOGY | Facility: CLINIC | Age: 61
End: 2021-07-30

## 2021-07-30 ENCOUNTER — PATIENT MESSAGE (OUTPATIENT)
Dept: INTERNAL MEDICINE | Facility: CLINIC | Age: 61
End: 2021-07-30

## 2021-07-30 ENCOUNTER — TELEPHONE (OUTPATIENT)
Dept: CARDIOLOGY | Facility: CLINIC | Age: 61
End: 2021-07-30

## 2021-07-30 DIAGNOSIS — E87.6 HYPOKALEMIA: ICD-10-CM

## 2021-07-30 RX ORDER — POTASSIUM CHLORIDE 20 MEQ/1
20 TABLET, EXTENDED RELEASE ORAL DAILY
Qty: 30 TABLET | Refills: 11 | Status: SHIPPED | OUTPATIENT
Start: 2021-07-30 | End: 2021-10-27

## 2021-08-04 ENCOUNTER — PATIENT OUTREACH (OUTPATIENT)
Dept: ADMINISTRATIVE | Facility: HOSPITAL | Age: 61
End: 2021-08-04

## 2021-08-04 DIAGNOSIS — Z12.11 SCREENING FOR COLORECTAL CANCER: Primary | ICD-10-CM

## 2021-08-04 DIAGNOSIS — Z12.12 SCREENING FOR COLORECTAL CANCER: Primary | ICD-10-CM

## 2021-08-05 ENCOUNTER — TELEPHONE (OUTPATIENT)
Dept: CARDIOLOGY | Facility: CLINIC | Age: 61
End: 2021-08-05
Payer: MEDICAID

## 2021-08-06 ENCOUNTER — LAB VISIT (OUTPATIENT)
Dept: LAB | Facility: HOSPITAL | Age: 61
End: 2021-08-06
Attending: INTERNAL MEDICINE
Payer: MEDICAID

## 2021-08-06 ENCOUNTER — PATIENT MESSAGE (OUTPATIENT)
Dept: CARDIOLOGY | Facility: CLINIC | Age: 61
End: 2021-08-06

## 2021-08-06 DIAGNOSIS — E87.6 HYPOKALEMIA: ICD-10-CM

## 2021-08-06 DIAGNOSIS — I25.111 CORONARY ARTERY DISEASE INVOLVING NATIVE CORONARY ARTERY OF NATIVE HEART WITH ANGINA PECTORIS WITH DOCUMENTED SPASM: Primary | ICD-10-CM

## 2021-08-06 LAB
CREAT SERPL-MCNC: 2.4 MG/DL (ref 0.5–1.4)
EST. GFR  (AFRICAN AMERICAN): 33 ML/MIN/1.73 M^2
EST. GFR  (NON AFRICAN AMERICAN): 28 ML/MIN/1.73 M^2

## 2021-08-06 PROCEDURE — 82565 ASSAY OF CREATININE: CPT | Performed by: INTERNAL MEDICINE

## 2021-08-06 PROCEDURE — 36415 COLL VENOUS BLD VENIPUNCTURE: CPT | Performed by: INTERNAL MEDICINE

## 2021-08-09 ENCOUNTER — LAB VISIT (OUTPATIENT)
Dept: LAB | Facility: HOSPITAL | Age: 61
End: 2021-08-09
Attending: INTERNAL MEDICINE
Payer: MEDICAID

## 2021-08-09 DIAGNOSIS — E87.6 HYPOKALEMIA: Primary | ICD-10-CM

## 2021-08-09 DIAGNOSIS — I25.111 CORONARY ARTERY DISEASE INVOLVING NATIVE CORONARY ARTERY OF NATIVE HEART WITH ANGINA PECTORIS WITH DOCUMENTED SPASM: ICD-10-CM

## 2021-08-09 LAB — POTASSIUM SERPL-SCNC: 3.1 MMOL/L (ref 3.5–5.1)

## 2021-08-09 PROCEDURE — 36415 COLL VENOUS BLD VENIPUNCTURE: CPT | Performed by: INTERNAL MEDICINE

## 2021-08-09 PROCEDURE — 84132 ASSAY OF SERUM POTASSIUM: CPT | Performed by: INTERNAL MEDICINE

## 2021-08-10 DIAGNOSIS — N18.31 STAGE 3A CHRONIC KIDNEY DISEASE: Primary | ICD-10-CM

## 2021-08-11 ENCOUNTER — LAB VISIT (OUTPATIENT)
Dept: LAB | Facility: HOSPITAL | Age: 61
End: 2021-08-11
Attending: INTERNAL MEDICINE
Payer: MEDICAID

## 2021-08-11 ENCOUNTER — TELEPHONE (OUTPATIENT)
Dept: CARDIOLOGY | Facility: CLINIC | Age: 61
End: 2021-08-11

## 2021-08-11 DIAGNOSIS — N18.31 STAGE 3A CHRONIC KIDNEY DISEASE: ICD-10-CM

## 2021-08-11 LAB
25(OH)D3+25(OH)D2 SERPL-MCNC: 42 NG/ML (ref 30–96)
ALBUMIN SERPL BCP-MCNC: 2.9 G/DL (ref 3.5–5.2)
ANION GAP SERPL CALC-SCNC: 9 MMOL/L (ref 8–16)
BASOPHILS # BLD AUTO: 0.04 K/UL (ref 0–0.2)
BASOPHILS NFR BLD: 0.6 % (ref 0–1.9)
BUN SERPL-MCNC: 23 MG/DL (ref 6–20)
CALCIUM SERPL-MCNC: 8.7 MG/DL (ref 8.7–10.5)
CHLORIDE SERPL-SCNC: 105 MMOL/L (ref 95–110)
CO2 SERPL-SCNC: 24 MMOL/L (ref 23–29)
CREAT SERPL-MCNC: 2.2 MG/DL (ref 0.5–1.4)
DIFFERENTIAL METHOD: ABNORMAL
EOSINOPHIL # BLD AUTO: 0.1 K/UL (ref 0–0.5)
EOSINOPHIL NFR BLD: 2.3 % (ref 0–8)
ERYTHROCYTE [DISTWIDTH] IN BLOOD BY AUTOMATED COUNT: 13.1 % (ref 11.5–14.5)
EST. GFR  (AFRICAN AMERICAN): 36 ML/MIN/1.73 M^2
EST. GFR  (NON AFRICAN AMERICAN): 31 ML/MIN/1.73 M^2
FERRITIN SERPL-MCNC: 103 NG/ML (ref 20–300)
GLUCOSE SERPL-MCNC: 101 MG/DL (ref 70–110)
HCT VFR BLD AUTO: 24.9 % (ref 40–54)
HGB BLD-MCNC: 8.2 G/DL (ref 14–18)
IMM GRANULOCYTES # BLD AUTO: 0.01 K/UL (ref 0–0.04)
IMM GRANULOCYTES NFR BLD AUTO: 0.2 % (ref 0–0.5)
IRON SERPL-MCNC: 47 UG/DL (ref 45–160)
LYMPHOCYTES # BLD AUTO: 1.5 K/UL (ref 1–4.8)
LYMPHOCYTES NFR BLD: 23.9 % (ref 18–48)
MCH RBC QN AUTO: 29.2 PG (ref 27–31)
MCHC RBC AUTO-ENTMCNC: 32.9 G/DL (ref 32–36)
MCV RBC AUTO: 89 FL (ref 82–98)
MONOCYTES # BLD AUTO: 0.5 K/UL (ref 0.3–1)
MONOCYTES NFR BLD: 7.6 % (ref 4–15)
NEUTROPHILS # BLD AUTO: 4.1 K/UL (ref 1.8–7.7)
NEUTROPHILS NFR BLD: 65.4 % (ref 38–73)
NRBC BLD-RTO: 0 /100 WBC
PHOSPHATE SERPL-MCNC: 3.8 MG/DL (ref 2.7–4.5)
PLATELET # BLD AUTO: 185 K/UL (ref 150–450)
PMV BLD AUTO: 10.5 FL (ref 9.2–12.9)
POTASSIUM SERPL-SCNC: 3.4 MMOL/L (ref 3.5–5.1)
PTH-INTACT SERPL-MCNC: 60.6 PG/ML (ref 9–77)
RBC # BLD AUTO: 2.81 M/UL (ref 4.6–6.2)
SATURATED IRON: 14 % (ref 20–50)
SODIUM SERPL-SCNC: 138 MMOL/L (ref 136–145)
TOTAL IRON BINDING CAPACITY: 336 UG/DL (ref 250–450)
TRANSFERRIN SERPL-MCNC: 227 MG/DL (ref 200–375)
URATE SERPL-MCNC: 6.8 MG/DL (ref 3.4–7)
WBC # BLD AUTO: 6.19 K/UL (ref 3.9–12.7)

## 2021-08-11 PROCEDURE — 85025 COMPLETE CBC W/AUTO DIFF WBC: CPT | Performed by: INTERNAL MEDICINE

## 2021-08-11 PROCEDURE — 83540 ASSAY OF IRON: CPT | Performed by: INTERNAL MEDICINE

## 2021-08-11 PROCEDURE — 82306 VITAMIN D 25 HYDROXY: CPT | Performed by: INTERNAL MEDICINE

## 2021-08-11 PROCEDURE — 36415 COLL VENOUS BLD VENIPUNCTURE: CPT | Performed by: INTERNAL MEDICINE

## 2021-08-11 PROCEDURE — 82728 ASSAY OF FERRITIN: CPT | Performed by: INTERNAL MEDICINE

## 2021-08-11 PROCEDURE — 83970 ASSAY OF PARATHORMONE: CPT | Performed by: INTERNAL MEDICINE

## 2021-08-11 PROCEDURE — 80069 RENAL FUNCTION PANEL: CPT | Performed by: INTERNAL MEDICINE

## 2021-08-11 PROCEDURE — 84550 ASSAY OF BLOOD/URIC ACID: CPT | Performed by: INTERNAL MEDICINE

## 2021-08-17 ENCOUNTER — PATIENT MESSAGE (OUTPATIENT)
Dept: INTERNAL MEDICINE | Facility: CLINIC | Age: 61
End: 2021-08-17

## 2021-08-18 ENCOUNTER — TELEPHONE (OUTPATIENT)
Dept: CARDIOLOGY | Facility: CLINIC | Age: 61
End: 2021-08-18

## 2021-08-19 ENCOUNTER — HOSPITAL ENCOUNTER (OUTPATIENT)
Facility: HOSPITAL | Age: 61
Discharge: HOME | End: 2021-08-20
Attending: EMERGENCY MEDICINE | Admitting: HOSPITALIST
Payer: MEDICAID

## 2021-08-19 ENCOUNTER — PATIENT MESSAGE (OUTPATIENT)
Dept: CARDIOLOGY | Facility: CLINIC | Age: 61
End: 2021-08-19

## 2021-08-19 ENCOUNTER — LAB VISIT (OUTPATIENT)
Dept: LAB | Facility: HOSPITAL | Age: 61
End: 2021-08-19
Attending: INTERNAL MEDICINE
Payer: MEDICAID

## 2021-08-19 DIAGNOSIS — R07.9 CHEST PAIN: Primary | ICD-10-CM

## 2021-08-19 DIAGNOSIS — E87.6 HYPOKALEMIA: Primary | ICD-10-CM

## 2021-08-19 DIAGNOSIS — I10 ESSENTIAL HYPERTENSION: ICD-10-CM

## 2021-08-19 DIAGNOSIS — R79.89 ELEVATED TROPONIN: ICD-10-CM

## 2021-08-19 DIAGNOSIS — D64.9 ACUTE ON CHRONIC ANEMIA: ICD-10-CM

## 2021-08-19 DIAGNOSIS — N18.32 STAGE 3B CHRONIC KIDNEY DISEASE: ICD-10-CM

## 2021-08-19 DIAGNOSIS — D62 ACUTE BLOOD LOSS ANEMIA: ICD-10-CM

## 2021-08-19 DIAGNOSIS — E83.42 HYPOMAGNESEMIA: ICD-10-CM

## 2021-08-19 LAB
ALBUMIN SERPL BCP-MCNC: 2.9 G/DL (ref 3.5–5.2)
ALBUMIN SERPL BCP-MCNC: 3 G/DL (ref 3.5–5.2)
ALP SERPL-CCNC: 60 U/L (ref 55–135)
ALP SERPL-CCNC: 62 U/L (ref 55–135)
ALT SERPL W/O P-5'-P-CCNC: 12 U/L (ref 10–44)
ALT SERPL W/O P-5'-P-CCNC: 13 U/L (ref 10–44)
ANION GAP SERPL CALC-SCNC: 8 MMOL/L (ref 8–16)
ANION GAP SERPL CALC-SCNC: 9 MMOL/L (ref 8–16)
AST SERPL-CCNC: 17 U/L (ref 10–40)
AST SERPL-CCNC: 19 U/L (ref 10–40)
BASOPHILS # BLD AUTO: 0.03 K/UL (ref 0–0.2)
BASOPHILS NFR BLD: 0.6 % (ref 0–1.9)
BILIRUB SERPL-MCNC: 0.4 MG/DL (ref 0.1–1)
BILIRUB SERPL-MCNC: 0.5 MG/DL (ref 0.1–1)
BNP SERPL-MCNC: 444 PG/ML (ref 0–99)
BUN SERPL-MCNC: 21 MG/DL (ref 6–20)
BUN SERPL-MCNC: 21 MG/DL (ref 6–20)
CALCIUM SERPL-MCNC: 9.3 MG/DL (ref 8.7–10.5)
CALCIUM SERPL-MCNC: 9.4 MG/DL (ref 8.7–10.5)
CHLORIDE SERPL-SCNC: 106 MMOL/L (ref 95–110)
CHLORIDE SERPL-SCNC: 109 MMOL/L (ref 95–110)
CO2 SERPL-SCNC: 21 MMOL/L (ref 23–29)
CO2 SERPL-SCNC: 23 MMOL/L (ref 23–29)
CREAT SERPL-MCNC: 2 MG/DL (ref 0.5–1.4)
CREAT SERPL-MCNC: 2.2 MG/DL (ref 0.5–1.4)
DIFFERENTIAL METHOD: ABNORMAL
EOSINOPHIL # BLD AUTO: 0.2 K/UL (ref 0–0.5)
EOSINOPHIL NFR BLD: 3.2 % (ref 0–8)
ERYTHROCYTE [DISTWIDTH] IN BLOOD BY AUTOMATED COUNT: 12.6 % (ref 11.5–14.5)
EST. GFR  (AFRICAN AMERICAN): 36 ML/MIN/1.73 M^2
EST. GFR  (AFRICAN AMERICAN): 40.7 ML/MIN/1.73 M^2
EST. GFR  (NON AFRICAN AMERICAN): 31 ML/MIN/1.73 M^2
EST. GFR  (NON AFRICAN AMERICAN): 35.2 ML/MIN/1.73 M^2
GLUCOSE SERPL-MCNC: 95 MG/DL (ref 70–110)
GLUCOSE SERPL-MCNC: 96 MG/DL (ref 70–110)
HCT VFR BLD AUTO: 22.5 % (ref 40–54)
HGB BLD-MCNC: 7.6 G/DL (ref 14–18)
IMM GRANULOCYTES # BLD AUTO: 0.01 K/UL (ref 0–0.04)
IMM GRANULOCYTES NFR BLD AUTO: 0.2 % (ref 0–0.5)
LYMPHOCYTES # BLD AUTO: 1.5 K/UL (ref 1–4.8)
LYMPHOCYTES NFR BLD: 29.7 % (ref 18–48)
MAGNESIUM SERPL-MCNC: 1.8 MG/DL (ref 1.6–2.6)
MCH RBC QN AUTO: 29.5 PG (ref 27–31)
MCHC RBC AUTO-ENTMCNC: 33.8 G/DL (ref 32–36)
MCV RBC AUTO: 87 FL (ref 82–98)
MONOCYTES # BLD AUTO: 0.5 K/UL (ref 0.3–1)
MONOCYTES NFR BLD: 10.1 % (ref 4–15)
NEUTROPHILS # BLD AUTO: 2.8 K/UL (ref 1.8–7.7)
NEUTROPHILS NFR BLD: 56.2 % (ref 38–73)
NRBC BLD-RTO: 0 /100 WBC
PLATELET # BLD AUTO: 141 K/UL (ref 150–450)
PMV BLD AUTO: 10.7 FL (ref 9.2–12.9)
POTASSIUM SERPL-SCNC: 3.5 MMOL/L (ref 3.5–5.1)
POTASSIUM SERPL-SCNC: 3.5 MMOL/L (ref 3.5–5.1)
PROT SERPL-MCNC: 6 G/DL (ref 6–8.4)
PROT SERPL-MCNC: 6.2 G/DL (ref 6–8.4)
RBC # BLD AUTO: 2.58 M/UL (ref 4.6–6.2)
SODIUM SERPL-SCNC: 137 MMOL/L (ref 136–145)
SODIUM SERPL-SCNC: 139 MMOL/L (ref 136–145)
TROPONIN I SERPL DL<=0.01 NG/ML-MCNC: 0.03 NG/ML (ref 0–0.03)
WBC # BLD AUTO: 5.05 K/UL (ref 3.9–12.7)

## 2021-08-19 PROCEDURE — 93005 ELECTROCARDIOGRAM TRACING: CPT

## 2021-08-19 PROCEDURE — 83735 ASSAY OF MAGNESIUM: CPT | Performed by: INTERNAL MEDICINE

## 2021-08-19 PROCEDURE — 36415 COLL VENOUS BLD VENIPUNCTURE: CPT | Mod: PO | Performed by: INTERNAL MEDICINE

## 2021-08-19 PROCEDURE — 93010 ELECTROCARDIOGRAM REPORT: CPT | Mod: ,,, | Performed by: INTERNAL MEDICINE

## 2021-08-19 PROCEDURE — 85025 COMPLETE CBC W/AUTO DIFF WBC: CPT | Mod: 91 | Performed by: INTERNAL MEDICINE

## 2021-08-19 PROCEDURE — 85025 COMPLETE CBC W/AUTO DIFF WBC: CPT | Performed by: EMERGENCY MEDICINE

## 2021-08-19 PROCEDURE — 96374 THER/PROPH/DIAG INJ IV PUSH: CPT

## 2021-08-19 PROCEDURE — 99285 EMERGENCY DEPT VISIT HI MDM: CPT | Mod: 25

## 2021-08-19 PROCEDURE — 80053 COMPREHEN METABOLIC PANEL: CPT | Mod: 91 | Performed by: INTERNAL MEDICINE

## 2021-08-19 PROCEDURE — 80053 COMPREHEN METABOLIC PANEL: CPT | Performed by: EMERGENCY MEDICINE

## 2021-08-19 PROCEDURE — 84484 ASSAY OF TROPONIN QUANT: CPT | Performed by: EMERGENCY MEDICINE

## 2021-08-19 PROCEDURE — 25000003 PHARM REV CODE 250: Performed by: EMERGENCY MEDICINE

## 2021-08-19 PROCEDURE — 93010 EKG 12-LEAD: ICD-10-PCS | Mod: ,,, | Performed by: INTERNAL MEDICINE

## 2021-08-19 PROCEDURE — 83880 ASSAY OF NATRIURETIC PEPTIDE: CPT | Performed by: EMERGENCY MEDICINE

## 2021-08-19 RX ORDER — HYDRALAZINE HYDROCHLORIDE 25 MG/1
100 TABLET, FILM COATED ORAL
Status: COMPLETED | OUTPATIENT
Start: 2021-08-19 | End: 2021-08-19

## 2021-08-19 RX ORDER — CARVEDILOL 25 MG/1
50 TABLET ORAL
Status: COMPLETED | OUTPATIENT
Start: 2021-08-19 | End: 2021-08-19

## 2021-08-19 RX ORDER — DOXAZOSIN 2 MG/1
4 TABLET ORAL
Status: COMPLETED | OUTPATIENT
Start: 2021-08-19 | End: 2021-08-19

## 2021-08-19 RX ORDER — ASPIRIN 325 MG
325 TABLET ORAL
Status: DISCONTINUED | OUTPATIENT
Start: 2021-08-19 | End: 2021-08-20

## 2021-08-19 RX ADMIN — CARVEDILOL 50 MG: 25 TABLET, FILM COATED ORAL at 10:08

## 2021-08-19 RX ADMIN — DOXAZOSIN 4 MG: 2 TABLET ORAL at 10:08

## 2021-08-19 RX ADMIN — HYDRALAZINE HYDROCHLORIDE 100 MG: 25 TABLET, FILM COATED ORAL at 10:08

## 2021-08-20 VITALS
WEIGHT: 189 LBS | DIASTOLIC BLOOD PRESSURE: 82 MMHG | OXYGEN SATURATION: 98 % | HEIGHT: 70 IN | TEMPERATURE: 98 F | HEART RATE: 62 BPM | BODY MASS INDEX: 27.06 KG/M2 | RESPIRATION RATE: 16 BRPM | SYSTOLIC BLOOD PRESSURE: 175 MMHG

## 2021-08-20 PROBLEM — J44.9 COPD (CHRONIC OBSTRUCTIVE PULMONARY DISEASE): Chronic | Status: ACTIVE | Noted: 2021-08-20

## 2021-08-20 PROBLEM — E87.6 HYPOKALEMIA: Status: RESOLVED | Noted: 2019-10-04 | Resolved: 2021-08-20

## 2021-08-20 PROBLEM — R07.9 CHEST PAIN: Status: RESOLVED | Noted: 2020-01-30 | Resolved: 2021-08-20

## 2021-08-20 PROBLEM — D64.9 ANEMIA: Chronic | Status: ACTIVE | Noted: 2021-08-20

## 2021-08-20 PROBLEM — N18.4 CKD (CHRONIC KIDNEY DISEASE), STAGE IV: Chronic | Status: ACTIVE | Noted: 2021-08-20

## 2021-08-20 LAB
ALBUMIN SERPL BCP-MCNC: 3.1 G/DL (ref 3.5–5.2)
ALP SERPL-CCNC: 63 U/L (ref 55–135)
ALT SERPL W/O P-5'-P-CCNC: 12 U/L (ref 10–44)
ANION GAP SERPL CALC-SCNC: 11 MMOL/L (ref 8–16)
AORTIC ROOT ANNULUS: 3.62 CM
ASCENDING AORTA: 3.3 CM
AST SERPL-CCNC: 17 U/L (ref 10–40)
AV INDEX (PROSTH): 0.73
AV MEAN GRADIENT: 2 MMHG
AV PEAK GRADIENT: 4 MMHG
AV VALVE AREA: 2.76 CM2
AV VELOCITY RATIO: 0.78
BASOPHILS # BLD AUTO: 0.03 K/UL (ref 0–0.2)
BASOPHILS # BLD AUTO: 0.04 K/UL (ref 0–0.2)
BASOPHILS NFR BLD: 0.7 % (ref 0–1.9)
BASOPHILS NFR BLD: 0.8 % (ref 0–1.9)
BILIRUB SERPL-MCNC: 0.5 MG/DL (ref 0.1–1)
BSA FOR ECHO PROCEDURE: 2.06 M2
BUN SERPL-MCNC: 21 MG/DL (ref 6–20)
CALCIUM SERPL-MCNC: 9.4 MG/DL (ref 8.7–10.5)
CHLORIDE SERPL-SCNC: 108 MMOL/L (ref 95–110)
CHOLEST SERPL-MCNC: 105 MG/DL (ref 120–199)
CHOLEST/HDLC SERPL: 2.4 {RATIO} (ref 2–5)
CO2 SERPL-SCNC: 20 MMOL/L (ref 23–29)
CREAT SERPL-MCNC: 2 MG/DL (ref 0.5–1.4)
CTP QC/QA: YES
CV ECHO LV RWT: 0.31 CM
DIFFERENTIAL METHOD: ABNORMAL
DIFFERENTIAL METHOD: ABNORMAL
DOP CALC AO PEAK VEL: 1.04 M/S
DOP CALC AO VTI: 22.81 CM
DOP CALC LVOT AREA: 3.8 CM2
DOP CALC LVOT DIAMETER: 2.2 CM
DOP CALC LVOT PEAK VEL: 0.81 M/S
DOP CALC LVOT STROKE VOLUME: 62.92 CM3
DOP CALCLVOT PEAK VEL VTI: 16.56 CM
E WAVE DECELERATION TIME: 197.99 MSEC
E/A RATIO: 1.46
E/E' RATIO: 14.14 M/S
ECHO LV POSTERIOR WALL: 0.86 CM (ref 0.6–1.1)
EJECTION FRACTION: 55 %
EOSINOPHIL # BLD AUTO: 0.1 K/UL (ref 0–0.5)
EOSINOPHIL # BLD AUTO: 0.2 K/UL (ref 0–0.5)
EOSINOPHIL NFR BLD: 2.8 % (ref 0–8)
EOSINOPHIL NFR BLD: 2.9 % (ref 0–8)
ERYTHROCYTE [DISTWIDTH] IN BLOOD BY AUTOMATED COUNT: 12.6 % (ref 11.5–14.5)
ERYTHROCYTE [DISTWIDTH] IN BLOOD BY AUTOMATED COUNT: 12.7 % (ref 11.5–14.5)
EST. GFR  (AFRICAN AMERICAN): 41 ML/MIN/1.73 M^2
EST. GFR  (NON AFRICAN AMERICAN): 35 ML/MIN/1.73 M^2
FERRITIN SERPL-MCNC: 102 NG/ML (ref 20–300)
FOLATE SERPL-MCNC: 5.2 NG/ML (ref 4–24)
FRACTIONAL SHORTENING: 29 % (ref 28–44)
GLUCOSE SERPL-MCNC: 99 MG/DL (ref 70–110)
HCT VFR BLD AUTO: 24 % (ref 40–54)
HCT VFR BLD AUTO: 24.1 % (ref 40–54)
HDLC SERPL-MCNC: 44 MG/DL (ref 40–75)
HDLC SERPL: 41.9 % (ref 20–50)
HGB BLD-MCNC: 7.6 G/DL (ref 14–18)
HGB BLD-MCNC: 8.2 G/DL (ref 14–18)
IMM GRANULOCYTES # BLD AUTO: 0.01 K/UL (ref 0–0.04)
IMM GRANULOCYTES # BLD AUTO: 0.01 K/UL (ref 0–0.04)
IMM GRANULOCYTES NFR BLD AUTO: 0.2 % (ref 0–0.5)
IMM GRANULOCYTES NFR BLD AUTO: 0.2 % (ref 0–0.5)
INTERVENTRICULAR SEPTUM: 0.9 CM (ref 0.6–1.1)
IRON SERPL-MCNC: 59 UG/DL (ref 45–160)
IVRT: 91.34 MSEC
LA MAJOR: 4.83 CM
LA MINOR: 4.64 CM
LA WIDTH: 4.14 CM
LDLC SERPL CALC-MCNC: 51 MG/DL (ref 63–159)
LEFT ATRIUM SIZE: 3.77 CM
LEFT ATRIUM VOLUME INDEX MOD: 20.1 ML/M2
LEFT ATRIUM VOLUME INDEX: 30.8 ML/M2
LEFT ATRIUM VOLUME MOD: 40.97 CM3
LEFT ATRIUM VOLUME: 62.79 CM3
LEFT INTERNAL DIMENSION IN SYSTOLE: 3.94 CM (ref 2.1–4)
LEFT VENTRICLE DIASTOLIC VOLUME INDEX: 73.12 ML/M2
LEFT VENTRICLE DIASTOLIC VOLUME: 149.16 ML
LEFT VENTRICLE MASS INDEX: 89 G/M2
LEFT VENTRICLE SYSTOLIC VOLUME INDEX: 33.1 ML/M2
LEFT VENTRICLE SYSTOLIC VOLUME: 67.57 ML
LEFT VENTRICULAR INTERNAL DIMENSION IN DIASTOLE: 5.53 CM (ref 3.5–6)
LEFT VENTRICULAR MASS: 182.23 G
LV LATERAL E/E' RATIO: 11 M/S
LV SEPTAL E/E' RATIO: 19.8 M/S
LYMPHOCYTES # BLD AUTO: 1.2 K/UL (ref 1–4.8)
LYMPHOCYTES # BLD AUTO: 1.6 K/UL (ref 1–4.8)
LYMPHOCYTES NFR BLD: 25.2 % (ref 18–48)
LYMPHOCYTES NFR BLD: 29.8 % (ref 18–48)
MAGNESIUM SERPL-MCNC: 1.9 MG/DL (ref 1.6–2.6)
MCH RBC QN AUTO: 28.7 PG (ref 27–31)
MCH RBC QN AUTO: 29.8 PG (ref 27–31)
MCHC RBC AUTO-ENTMCNC: 31.5 G/DL (ref 32–36)
MCHC RBC AUTO-ENTMCNC: 34.2 G/DL (ref 32–36)
MCV RBC AUTO: 87 FL (ref 82–98)
MCV RBC AUTO: 91 FL (ref 82–98)
MONOCYTES # BLD AUTO: 0.4 K/UL (ref 0.3–1)
MONOCYTES # BLD AUTO: 0.4 K/UL (ref 0.3–1)
MONOCYTES NFR BLD: 8 % (ref 4–15)
MONOCYTES NFR BLD: 9.1 % (ref 4–15)
MV A" WAVE DURATION": 11.99 MSEC
MV MEAN GRADIENT: 1 MMHG
MV PEAK A VEL: 0.68 M/S
MV PEAK E VEL: 0.99 M/S
MV PEAK GRADIENT: 3 MMHG
MV STENOSIS PRESSURE HALF TIME: 57.42 MS
MV VALVE AREA P 1/2 METHOD: 3.83 CM2
NEUTROPHILS # BLD AUTO: 2.9 K/UL (ref 1.8–7.7)
NEUTROPHILS # BLD AUTO: 3.1 K/UL (ref 1.8–7.7)
NEUTROPHILS NFR BLD: 58.3 % (ref 38–73)
NEUTROPHILS NFR BLD: 62 % (ref 38–73)
NONHDLC SERPL-MCNC: 61 MG/DL
NRBC BLD-RTO: 0 /100 WBC
NRBC BLD-RTO: 0 /100 WBC
PHOSPHATE SERPL-MCNC: 3.7 MG/DL (ref 2.7–4.5)
PISA TR MAX VEL: 2.4 M/S
PLATELET # BLD AUTO: 156 K/UL (ref 150–450)
PLATELET # BLD AUTO: 156 K/UL (ref 150–450)
PMV BLD AUTO: 10.9 FL (ref 9.2–12.9)
PMV BLD AUTO: 11.3 FL (ref 9.2–12.9)
POTASSIUM SERPL-SCNC: 3.3 MMOL/L (ref 3.5–5.1)
PROT SERPL-MCNC: 6.5 G/DL (ref 6–8.4)
PULM VEIN S/D RATIO: 0.76
PV PEAK D VEL: 0.41 M/S
PV PEAK S VEL: 0.31 M/S
RA MAJOR: 4.77 CM
RA PRESSURE: 3 MMHG
RA WIDTH: 3.6 CM
RBC # BLD AUTO: 2.65 M/UL (ref 4.6–6.2)
RBC # BLD AUTO: 2.75 M/UL (ref 4.6–6.2)
RETICS/RBC NFR AUTO: 1 % (ref 0.4–2)
RIGHT VENTRICULAR END-DIASTOLIC DIMENSION: 2.62 CM
RV TISSUE DOPPLER FREE WALL SYSTOLIC VELOCITY 1 (APICAL 4 CHAMBER VIEW): 10.88 CM/S
SARS-COV-2 RDRP RESP QL NAA+PROBE: NEGATIVE
SATURATED IRON: 18 % (ref 20–50)
SODIUM SERPL-SCNC: 139 MMOL/L (ref 136–145)
STJ: 3.16 CM
T4 FREE SERPL-MCNC: 0.87 NG/DL (ref 0.71–1.51)
TDI LATERAL: 0.09 M/S
TDI SEPTAL: 0.05 M/S
TDI: 0.07 M/S
TOTAL IRON BINDING CAPACITY: 337 UG/DL (ref 250–450)
TR MAX PG: 23 MMHG
TRANSFERRIN SERPL-MCNC: 228 MG/DL (ref 200–375)
TRICUSPID ANNULAR PLANE SYSTOLIC EXCURSION: 2.09 CM
TRIGL SERPL-MCNC: 50 MG/DL (ref 30–150)
TROPONIN I SERPL DL<=0.01 NG/ML-MCNC: 0.02 NG/ML (ref 0–0.03)
TROPONIN I SERPL DL<=0.01 NG/ML-MCNC: 0.04 NG/ML (ref 0–0.03)
TSH SERPL DL<=0.005 MIU/L-ACNC: 8.74 UIU/ML (ref 0.4–4)
TV REST PULMONARY ARTERY PRESSURE: 26 MMHG
VIT B12 SERPL-MCNC: 294 PG/ML (ref 210–950)
WBC # BLD AUTO: 4.6 K/UL (ref 3.9–12.7)
WBC # BLD AUTO: 5.23 K/UL (ref 3.9–12.7)

## 2021-08-20 PROCEDURE — G0378 HOSPITAL OBSERVATION PER HR: HCPCS

## 2021-08-20 PROCEDURE — 99214 OFFICE O/P EST MOD 30 MIN: CPT | Mod: ,,, | Performed by: INTERNAL MEDICINE

## 2021-08-20 PROCEDURE — 83735 ASSAY OF MAGNESIUM: CPT | Performed by: NURSE PRACTITIONER

## 2021-08-20 PROCEDURE — 84466 ASSAY OF TRANSFERRIN: CPT | Performed by: NURSE PRACTITIONER

## 2021-08-20 PROCEDURE — 80053 COMPREHEN METABOLIC PANEL: CPT | Performed by: NURSE PRACTITIONER

## 2021-08-20 PROCEDURE — 25000003 PHARM REV CODE 250: Performed by: HOSPITALIST

## 2021-08-20 PROCEDURE — U0002 COVID-19 LAB TEST NON-CDC: HCPCS | Performed by: EMERGENCY MEDICINE

## 2021-08-20 PROCEDURE — 99214 PR OFFICE/OUTPT VISIT, EST, LEVL IV, 30-39 MIN: ICD-10-PCS | Mod: ,,, | Performed by: INTERNAL MEDICINE

## 2021-08-20 PROCEDURE — 25000003 PHARM REV CODE 250: Performed by: NURSE PRACTITIONER

## 2021-08-20 PROCEDURE — 80061 LIPID PANEL: CPT | Performed by: NURSE PRACTITIONER

## 2021-08-20 PROCEDURE — 84484 ASSAY OF TROPONIN QUANT: CPT | Performed by: EMERGENCY MEDICINE

## 2021-08-20 PROCEDURE — 85025 COMPLETE CBC W/AUTO DIFF WBC: CPT | Performed by: NURSE PRACTITIONER

## 2021-08-20 PROCEDURE — 63600175 PHARM REV CODE 636 W HCPCS: Performed by: NURSE PRACTITIONER

## 2021-08-20 PROCEDURE — 84443 ASSAY THYROID STIM HORMONE: CPT | Performed by: NURSE PRACTITIONER

## 2021-08-20 PROCEDURE — 85045 AUTOMATED RETICULOCYTE COUNT: CPT | Performed by: NURSE PRACTITIONER

## 2021-08-20 PROCEDURE — 82728 ASSAY OF FERRITIN: CPT | Performed by: NURSE PRACTITIONER

## 2021-08-20 PROCEDURE — 25000003 PHARM REV CODE 250: Performed by: INTERNAL MEDICINE

## 2021-08-20 PROCEDURE — 84484 ASSAY OF TROPONIN QUANT: CPT | Mod: 91 | Performed by: NURSE PRACTITIONER

## 2021-08-20 PROCEDURE — 84439 ASSAY OF FREE THYROXINE: CPT | Performed by: NURSE PRACTITIONER

## 2021-08-20 PROCEDURE — 84100 ASSAY OF PHOSPHORUS: CPT | Performed by: NURSE PRACTITIONER

## 2021-08-20 PROCEDURE — 82607 VITAMIN B-12: CPT | Performed by: NURSE PRACTITIONER

## 2021-08-20 PROCEDURE — 82746 ASSAY OF FOLIC ACID SERUM: CPT | Performed by: NURSE PRACTITIONER

## 2021-08-20 RX ORDER — POTASSIUM CHLORIDE 20 MEQ/1
40 TABLET, EXTENDED RELEASE ORAL ONCE
Status: COMPLETED | OUTPATIENT
Start: 2021-08-20 | End: 2021-08-20

## 2021-08-20 RX ORDER — ALBUTEROL SULFATE 90 UG/1
2 AEROSOL, METERED RESPIRATORY (INHALATION) EVERY 6 HOURS PRN
Status: DISCONTINUED | OUTPATIENT
Start: 2021-08-20 | End: 2021-08-20 | Stop reason: HOSPADM

## 2021-08-20 RX ORDER — ISOSORBIDE MONONITRATE 30 MG/1
60 TABLET, EXTENDED RELEASE ORAL DAILY
Status: DISCONTINUED | OUTPATIENT
Start: 2021-08-20 | End: 2021-08-20

## 2021-08-20 RX ORDER — NIFEDIPINE 60 MG/1
60 TABLET, EXTENDED RELEASE ORAL DAILY
Qty: 30 TABLET | Refills: 2 | Status: SHIPPED | OUTPATIENT
Start: 2021-08-21 | End: 2021-10-25 | Stop reason: SDUPTHER

## 2021-08-20 RX ORDER — SPIRONOLACTONE 25 MG/1
25 TABLET ORAL DAILY
Status: DISCONTINUED | OUTPATIENT
Start: 2021-08-20 | End: 2021-08-20 | Stop reason: HOSPADM

## 2021-08-20 RX ORDER — NIFEDIPINE 30 MG/1
60 TABLET, EXTENDED RELEASE ORAL DAILY
Status: DISCONTINUED | OUTPATIENT
Start: 2021-08-20 | End: 2021-08-20 | Stop reason: HOSPADM

## 2021-08-20 RX ORDER — CLOPIDOGREL BISULFATE 75 MG/1
75 TABLET ORAL DAILY
Status: DISCONTINUED | OUTPATIENT
Start: 2021-08-20 | End: 2021-08-20

## 2021-08-20 RX ORDER — CHOLECALCIFEROL (VITAMIN D3) 10 MCG
1200 TABLET ORAL DAILY
Status: DISCONTINUED | OUTPATIENT
Start: 2021-08-20 | End: 2021-08-20 | Stop reason: HOSPADM

## 2021-08-20 RX ORDER — POTASSIUM CHLORIDE 20 MEQ/1
20 TABLET, EXTENDED RELEASE ORAL DAILY
Status: DISCONTINUED | OUTPATIENT
Start: 2021-08-20 | End: 2021-08-20 | Stop reason: HOSPADM

## 2021-08-20 RX ORDER — AMOXICILLIN 250 MG
1 CAPSULE ORAL DAILY PRN
Status: DISCONTINUED | OUTPATIENT
Start: 2021-08-20 | End: 2021-08-20 | Stop reason: HOSPADM

## 2021-08-20 RX ORDER — ASPIRIN 81 MG/1
81 TABLET ORAL DAILY
Status: DISCONTINUED | OUTPATIENT
Start: 2021-08-20 | End: 2021-08-20

## 2021-08-20 RX ORDER — HYDRALAZINE HYDROCHLORIDE 20 MG/ML
10 INJECTION INTRAMUSCULAR; INTRAVENOUS EVERY 6 HOURS PRN
Status: DISCONTINUED | OUTPATIENT
Start: 2021-08-20 | End: 2021-08-20 | Stop reason: HOSPADM

## 2021-08-20 RX ORDER — TALC
6 POWDER (GRAM) TOPICAL NIGHTLY PRN
Status: DISCONTINUED | OUTPATIENT
Start: 2021-08-20 | End: 2021-08-20 | Stop reason: HOSPADM

## 2021-08-20 RX ORDER — SODIUM CHLORIDE 0.9 % (FLUSH) 0.9 %
10 SYRINGE (ML) INJECTION EVERY 8 HOURS PRN
Status: DISCONTINUED | OUTPATIENT
Start: 2021-08-20 | End: 2021-08-20 | Stop reason: HOSPADM

## 2021-08-20 RX ORDER — LOSARTAN POTASSIUM 50 MG/1
100 TABLET ORAL DAILY
Refills: 1 | Status: DISCONTINUED | OUTPATIENT
Start: 2021-08-20 | End: 2021-08-20 | Stop reason: HOSPADM

## 2021-08-20 RX ORDER — DOXAZOSIN 2 MG/1
4 TABLET ORAL NIGHTLY
Status: DISCONTINUED | OUTPATIENT
Start: 2021-08-20 | End: 2021-08-20 | Stop reason: HOSPADM

## 2021-08-20 RX ORDER — ONDANSETRON 2 MG/ML
4 INJECTION INTRAMUSCULAR; INTRAVENOUS EVERY 8 HOURS PRN
Status: DISCONTINUED | OUTPATIENT
Start: 2021-08-20 | End: 2021-08-20 | Stop reason: HOSPADM

## 2021-08-20 RX ORDER — NAPROXEN SODIUM 220 MG/1
81 TABLET, FILM COATED ORAL DAILY
Status: DISCONTINUED | OUTPATIENT
Start: 2021-08-20 | End: 2021-08-20 | Stop reason: HOSPADM

## 2021-08-20 RX ORDER — HYDRALAZINE HYDROCHLORIDE 25 MG/1
100 TABLET, FILM COATED ORAL 2 TIMES DAILY
Status: DISCONTINUED | OUTPATIENT
Start: 2021-08-20 | End: 2021-08-20 | Stop reason: HOSPADM

## 2021-08-20 RX ORDER — ACETAMINOPHEN 325 MG/1
650 TABLET ORAL EVERY 8 HOURS PRN
Status: DISCONTINUED | OUTPATIENT
Start: 2021-08-20 | End: 2021-08-20 | Stop reason: HOSPADM

## 2021-08-20 RX ORDER — CLOPIDOGREL BISULFATE 75 MG/1
75 TABLET ORAL DAILY
Status: DISCONTINUED | OUTPATIENT
Start: 2021-08-20 | End: 2021-08-20 | Stop reason: HOSPADM

## 2021-08-20 RX ORDER — PRAVASTATIN SODIUM 20 MG/1
40 TABLET ORAL DAILY
Status: DISCONTINUED | OUTPATIENT
Start: 2021-08-20 | End: 2021-08-20 | Stop reason: HOSPADM

## 2021-08-20 RX ADMIN — HYDRALAZINE HYDROCHLORIDE 10 MG: 20 INJECTION INTRAMUSCULAR; INTRAVENOUS at 05:08

## 2021-08-20 RX ADMIN — CLOPIDOGREL 75 MG: 75 TABLET, FILM COATED ORAL at 09:08

## 2021-08-20 RX ADMIN — LOSARTAN POTASSIUM 100 MG: 50 TABLET, FILM COATED ORAL at 09:08

## 2021-08-20 RX ADMIN — NIFEDIPINE 60 MG: 30 TABLET, FILM COATED, EXTENDED RELEASE ORAL at 01:08

## 2021-08-20 RX ADMIN — ASPIRIN 81 MG: 81 TABLET, CHEWABLE ORAL at 09:08

## 2021-08-20 RX ADMIN — POTASSIUM CHLORIDE 20 MEQ: 1500 TABLET, EXTENDED RELEASE ORAL at 09:08

## 2021-08-20 RX ADMIN — PRAVASTATIN SODIUM 40 MG: 20 TABLET ORAL at 09:08

## 2021-08-20 RX ADMIN — HYDRALAZINE HYDROCHLORIDE 100 MG: 25 TABLET, FILM COATED ORAL at 09:08

## 2021-08-20 RX ADMIN — Medication 1200 UNITS: at 09:08

## 2021-08-20 RX ADMIN — POTASSIUM CHLORIDE 40 MEQ: 1500 TABLET, EXTENDED RELEASE ORAL at 06:08

## 2021-08-23 ENCOUNTER — OFFICE VISIT (OUTPATIENT)
Dept: INTERNAL MEDICINE | Facility: CLINIC | Age: 61
End: 2021-08-23
Payer: MEDICAID

## 2021-08-23 DIAGNOSIS — E53.8 VITAMIN B12 DEFICIENCY: ICD-10-CM

## 2021-08-23 DIAGNOSIS — I10 ESSENTIAL HYPERTENSION: ICD-10-CM

## 2021-08-23 DIAGNOSIS — N18.32 STAGE 3B CHRONIC KIDNEY DISEASE: ICD-10-CM

## 2021-08-23 DIAGNOSIS — D64.9 ANEMIA, UNSPECIFIED TYPE: Primary | ICD-10-CM

## 2021-08-23 PROCEDURE — 99214 OFFICE O/P EST MOD 30 MIN: CPT | Mod: 95,,, | Performed by: INTERNAL MEDICINE

## 2021-08-23 PROCEDURE — 99214 PR OFFICE/OUTPT VISIT, EST, LEVL IV, 30-39 MIN: ICD-10-PCS | Mod: 95,,, | Performed by: INTERNAL MEDICINE

## 2021-08-24 ENCOUNTER — LAB VISIT (OUTPATIENT)
Dept: LAB | Facility: HOSPITAL | Age: 61
End: 2021-08-24
Attending: INTERNAL MEDICINE
Payer: MEDICAID

## 2021-08-24 ENCOUNTER — TELEPHONE (OUTPATIENT)
Dept: HEMATOLOGY/ONCOLOGY | Facility: CLINIC | Age: 61
End: 2021-08-24

## 2021-08-24 DIAGNOSIS — Z12.12 SCREENING FOR COLORECTAL CANCER: ICD-10-CM

## 2021-08-24 DIAGNOSIS — Z12.11 SCREENING FOR COLORECTAL CANCER: ICD-10-CM

## 2021-08-24 PROCEDURE — 82274 ASSAY TEST FOR BLOOD FECAL: CPT | Performed by: INTERNAL MEDICINE

## 2021-09-13 ENCOUNTER — PATIENT MESSAGE (OUTPATIENT)
Dept: INTERNAL MEDICINE | Facility: CLINIC | Age: 61
End: 2021-09-13

## 2021-09-16 ENCOUNTER — OFFICE VISIT (OUTPATIENT)
Dept: INTERNAL MEDICINE | Facility: CLINIC | Age: 61
End: 2021-09-16
Payer: MEDICAID

## 2021-09-16 ENCOUNTER — PATIENT MESSAGE (OUTPATIENT)
Dept: INTERNAL MEDICINE | Facility: CLINIC | Age: 61
End: 2021-09-16

## 2021-09-16 ENCOUNTER — LAB VISIT (OUTPATIENT)
Dept: LAB | Facility: HOSPITAL | Age: 61
End: 2021-09-16
Attending: INTERNAL MEDICINE
Payer: MEDICAID

## 2021-09-16 ENCOUNTER — TELEPHONE (OUTPATIENT)
Dept: HEMATOLOGY/ONCOLOGY | Facility: CLINIC | Age: 61
End: 2021-09-16

## 2021-09-16 ENCOUNTER — TELEPHONE (OUTPATIENT)
Dept: FAMILY MEDICINE | Facility: CLINIC | Age: 61
End: 2021-09-16

## 2021-09-16 VITALS
SYSTOLIC BLOOD PRESSURE: 114 MMHG | WEIGHT: 176.13 LBS | OXYGEN SATURATION: 98 % | HEART RATE: 69 BPM | DIASTOLIC BLOOD PRESSURE: 46 MMHG | BODY MASS INDEX: 25.27 KG/M2

## 2021-09-16 DIAGNOSIS — M54.9 ACUTE BILATERAL BACK PAIN, UNSPECIFIED BACK LOCATION: Primary | ICD-10-CM

## 2021-09-16 DIAGNOSIS — D64.9 ANEMIA, UNSPECIFIED TYPE: ICD-10-CM

## 2021-09-16 LAB
ANION GAP SERPL CALC-SCNC: 7 MMOL/L (ref 8–16)
BASOPHILS # BLD AUTO: 0.03 K/UL (ref 0–0.2)
BASOPHILS NFR BLD: 0.7 % (ref 0–1.9)
BILIRUB SERPL-MCNC: 0.5 MG/DL (ref 0.1–1)
BUN SERPL-MCNC: 43 MG/DL (ref 6–20)
CALCIUM SERPL-MCNC: 9.2 MG/DL (ref 8.7–10.5)
CHLORIDE SERPL-SCNC: 109 MMOL/L (ref 95–110)
CO2 SERPL-SCNC: 17 MMOL/L (ref 23–29)
CREAT SERPL-MCNC: 2.6 MG/DL (ref 0.5–1.4)
DIFFERENTIAL METHOD: ABNORMAL
EOSINOPHIL # BLD AUTO: 0.2 K/UL (ref 0–0.5)
EOSINOPHIL NFR BLD: 4.2 % (ref 0–8)
ERYTHROCYTE [DISTWIDTH] IN BLOOD BY AUTOMATED COUNT: 12.4 % (ref 11.5–14.5)
EST. GFR  (AFRICAN AMERICAN): 29.7 ML/MIN/1.73 M^2
EST. GFR  (NON AFRICAN AMERICAN): 25.7 ML/MIN/1.73 M^2
FOLATE SERPL-MCNC: 5.4 NG/ML (ref 4–24)
GLUCOSE SERPL-MCNC: 90 MG/DL (ref 70–110)
HAPTOGLOB SERPL-MCNC: 152 MG/DL (ref 30–250)
HCT VFR BLD AUTO: 23.5 % (ref 40–54)
HEMOCCULT STL QL IA: POSITIVE
HGB BLD-MCNC: 7.8 G/DL (ref 14–18)
IMM GRANULOCYTES # BLD AUTO: 0.01 K/UL (ref 0–0.04)
IMM GRANULOCYTES NFR BLD AUTO: 0.2 % (ref 0–0.5)
IRON SERPL-MCNC: 104 UG/DL (ref 45–160)
LDH SERPL L TO P-CCNC: 221 U/L (ref 110–260)
LYMPHOCYTES # BLD AUTO: 1.2 K/UL (ref 1–4.8)
LYMPHOCYTES NFR BLD: 28.5 % (ref 18–48)
MCH RBC QN AUTO: 29.4 PG (ref 27–31)
MCHC RBC AUTO-ENTMCNC: 33.2 G/DL (ref 32–36)
MCV RBC AUTO: 89 FL (ref 82–98)
MONOCYTES # BLD AUTO: 0.3 K/UL (ref 0.3–1)
MONOCYTES NFR BLD: 7.9 % (ref 4–15)
NEUTROPHILS # BLD AUTO: 2.5 K/UL (ref 1.8–7.7)
NEUTROPHILS NFR BLD: 58.5 % (ref 38–73)
NRBC BLD-RTO: 0 /100 WBC
PATH REV BLD -IMP: NORMAL
PLATELET # BLD AUTO: 152 K/UL (ref 150–450)
PMV BLD AUTO: 10.2 FL (ref 9.2–12.9)
POTASSIUM SERPL-SCNC: 6.3 MMOL/L (ref 3.5–5.1)
RBC # BLD AUTO: 2.65 M/UL (ref 4.6–6.2)
RETICS/RBC NFR AUTO: 0.7 % (ref 0.4–2)
SATURATED IRON: 30 % (ref 20–50)
SODIUM SERPL-SCNC: 133 MMOL/L (ref 136–145)
TOTAL IRON BINDING CAPACITY: 349 UG/DL (ref 250–450)
TRANSFERRIN SERPL-MCNC: 236 MG/DL (ref 200–375)
VIT B12 SERPL-MCNC: 354 PG/ML (ref 210–950)
WBC # BLD AUTO: 4.32 K/UL (ref 3.9–12.7)

## 2021-09-16 PROCEDURE — 99214 PR OFFICE/OUTPT VISIT, EST, LEVL IV, 30-39 MIN: ICD-10-PCS | Mod: S$PBB,,, | Performed by: INTERNAL MEDICINE

## 2021-09-16 PROCEDURE — 85045 AUTOMATED RETICULOCYTE COUNT: CPT | Performed by: INTERNAL MEDICINE

## 2021-09-16 PROCEDURE — 99214 OFFICE O/P EST MOD 30 MIN: CPT | Mod: PBBFAC,PO | Performed by: INTERNAL MEDICINE

## 2021-09-16 PROCEDURE — 82746 ASSAY OF FOLIC ACID SERUM: CPT | Performed by: INTERNAL MEDICINE

## 2021-09-16 PROCEDURE — 85060 PATHOLOGIST REVIEW: ICD-10-PCS | Mod: ,,, | Performed by: PATHOLOGY

## 2021-09-16 PROCEDURE — 83615 LACTATE (LD) (LDH) ENZYME: CPT | Performed by: INTERNAL MEDICINE

## 2021-09-16 PROCEDURE — 99999 PR PBB SHADOW E&M-EST. PATIENT-LVL IV: ICD-10-PCS | Mod: PBBFAC,,, | Performed by: INTERNAL MEDICINE

## 2021-09-16 PROCEDURE — 82607 VITAMIN B-12: CPT | Performed by: INTERNAL MEDICINE

## 2021-09-16 PROCEDURE — 36415 COLL VENOUS BLD VENIPUNCTURE: CPT | Mod: PO | Performed by: INTERNAL MEDICINE

## 2021-09-16 PROCEDURE — 85060 BLOOD SMEAR INTERPRETATION: CPT | Mod: ,,, | Performed by: PATHOLOGY

## 2021-09-16 PROCEDURE — 83010 ASSAY OF HAPTOGLOBIN QUANT: CPT | Performed by: INTERNAL MEDICINE

## 2021-09-16 PROCEDURE — 99214 OFFICE O/P EST MOD 30 MIN: CPT | Mod: S$PBB,,, | Performed by: INTERNAL MEDICINE

## 2021-09-16 PROCEDURE — 82247 BILIRUBIN TOTAL: CPT | Performed by: INTERNAL MEDICINE

## 2021-09-16 PROCEDURE — 99999 PR PBB SHADOW E&M-EST. PATIENT-LVL IV: CPT | Mod: PBBFAC,,, | Performed by: INTERNAL MEDICINE

## 2021-09-16 PROCEDURE — 80048 BASIC METABOLIC PNL TOTAL CA: CPT | Performed by: INTERNAL MEDICINE

## 2021-09-16 PROCEDURE — 84466 ASSAY OF TRANSFERRIN: CPT | Performed by: INTERNAL MEDICINE

## 2021-09-16 PROCEDURE — 85025 COMPLETE CBC W/AUTO DIFF WBC: CPT | Performed by: INTERNAL MEDICINE

## 2021-09-16 RX ORDER — GABAPENTIN 300 MG/1
300 CAPSULE ORAL 3 TIMES DAILY PRN
Qty: 90 CAPSULE | Refills: 11 | Status: SHIPPED | OUTPATIENT
Start: 2021-09-16 | End: 2022-09-02 | Stop reason: SDUPTHER

## 2021-09-17 ENCOUNTER — TELEPHONE (OUTPATIENT)
Dept: GASTROENTEROLOGY | Facility: CLINIC | Age: 61
End: 2021-09-17

## 2021-09-17 DIAGNOSIS — D64.9 ANEMIA, UNSPECIFIED TYPE: Primary | ICD-10-CM

## 2021-09-17 DIAGNOSIS — R19.5 POSITIVE FIT (FECAL IMMUNOCHEMICAL TEST): ICD-10-CM

## 2021-09-17 DIAGNOSIS — E87.5 HYPERKALEMIA: Primary | ICD-10-CM

## 2021-09-17 LAB — PATH REV BLD -IMP: NORMAL

## 2021-09-20 ENCOUNTER — OFFICE VISIT (OUTPATIENT)
Dept: HEMATOLOGY/ONCOLOGY | Facility: CLINIC | Age: 61
End: 2021-09-20
Payer: MEDICAID

## 2021-09-20 VITALS
BODY MASS INDEX: 25.73 KG/M2 | HEART RATE: 70 BPM | RESPIRATION RATE: 18 BRPM | SYSTOLIC BLOOD PRESSURE: 99 MMHG | OXYGEN SATURATION: 99 % | TEMPERATURE: 98 F | WEIGHT: 179.69 LBS | HEIGHT: 70 IN | DIASTOLIC BLOOD PRESSURE: 46 MMHG

## 2021-09-20 DIAGNOSIS — D64.9 ANEMIA, UNSPECIFIED TYPE: ICD-10-CM

## 2021-09-20 DIAGNOSIS — I73.9 PAD (PERIPHERAL ARTERY DISEASE): Primary | ICD-10-CM

## 2021-09-20 DIAGNOSIS — I25.10 CORONARY ARTERY DISEASE INVOLVING NATIVE CORONARY ARTERY OF NATIVE HEART WITHOUT ANGINA PECTORIS: ICD-10-CM

## 2021-09-20 PROCEDURE — 99205 PR OFFICE/OUTPT VISIT, NEW, LEVL V, 60-74 MIN: ICD-10-PCS | Mod: S$PBB,,, | Performed by: INTERNAL MEDICINE

## 2021-09-20 PROCEDURE — 99215 OFFICE O/P EST HI 40 MIN: CPT | Mod: PBBFAC,PO | Performed by: INTERNAL MEDICINE

## 2021-09-20 PROCEDURE — 99999 PR PBB SHADOW E&M-EST. PATIENT-LVL V: CPT | Mod: PBBFAC,,, | Performed by: INTERNAL MEDICINE

## 2021-09-20 PROCEDURE — 99999 PR PBB SHADOW E&M-EST. PATIENT-LVL V: ICD-10-PCS | Mod: PBBFAC,,, | Performed by: INTERNAL MEDICINE

## 2021-09-20 PROCEDURE — 99205 OFFICE O/P NEW HI 60 MIN: CPT | Mod: S$PBB,,, | Performed by: INTERNAL MEDICINE

## 2021-09-20 RX ORDER — FENTANYL CITRATE 50 UG/ML
100 INJECTION, SOLUTION INTRAMUSCULAR; INTRAVENOUS ONCE AS NEEDED
Status: CANCELLED | OUTPATIENT
Start: 2021-09-20 | End: 2033-02-16

## 2021-09-20 RX ORDER — LIDOCAINE HYDROCHLORIDE 10 MG/ML
1 INJECTION, SOLUTION EPIDURAL; INFILTRATION; INTRACAUDAL; PERINEURAL ONCE
Status: CANCELLED | OUTPATIENT
Start: 2021-09-20 | End: 2021-09-20

## 2021-09-20 RX ORDER — SODIUM CHLORIDE 9 MG/ML
INJECTION, SOLUTION INTRAVENOUS CONTINUOUS
Status: CANCELLED | OUTPATIENT
Start: 2021-09-20

## 2021-09-20 RX ORDER — MIDAZOLAM HYDROCHLORIDE 5 MG/ML
5 INJECTION INTRAMUSCULAR; INTRAVENOUS ONCE AS NEEDED
Status: CANCELLED | OUTPATIENT
Start: 2021-09-20 | End: 2033-02-16

## 2021-09-21 ENCOUNTER — TELEPHONE (OUTPATIENT)
Dept: HEMATOLOGY/ONCOLOGY | Facility: CLINIC | Age: 61
End: 2021-09-21

## 2021-09-23 ENCOUNTER — TELEPHONE (OUTPATIENT)
Dept: HEMATOLOGY/ONCOLOGY | Facility: CLINIC | Age: 61
End: 2021-09-23

## 2021-10-04 ENCOUNTER — TELEPHONE (OUTPATIENT)
Dept: HEMATOLOGY/ONCOLOGY | Facility: CLINIC | Age: 61
End: 2021-10-04

## 2021-10-05 ENCOUNTER — PATIENT MESSAGE (OUTPATIENT)
Dept: CARDIOLOGY | Facility: CLINIC | Age: 61
End: 2021-10-05

## 2021-10-09 ENCOUNTER — LAB VISIT (OUTPATIENT)
Dept: SPORTS MEDICINE | Facility: CLINIC | Age: 61
End: 2021-10-09
Payer: MEDICAID

## 2021-10-09 DIAGNOSIS — D64.9 ANEMIA, UNSPECIFIED TYPE: ICD-10-CM

## 2021-10-09 PROCEDURE — U0005 INFEC AGEN DETEC AMPLI PROBE: HCPCS | Performed by: INTERNAL MEDICINE

## 2021-10-09 PROCEDURE — U0003 INFECTIOUS AGENT DETECTION BY NUCLEIC ACID (DNA OR RNA); SEVERE ACUTE RESPIRATORY SYNDROME CORONAVIRUS 2 (SARS-COV-2) (CORONAVIRUS DISEASE [COVID-19]), AMPLIFIED PROBE TECHNIQUE, MAKING USE OF HIGH THROUGHPUT TECHNOLOGIES AS DESCRIBED BY CMS-2020-01-R: HCPCS | Performed by: INTERNAL MEDICINE

## 2021-10-10 LAB
SARS-COV-2 RNA RESP QL NAA+PROBE: NOT DETECTED
SARS-COV-2- CYCLE NUMBER: NORMAL

## 2021-10-12 ENCOUNTER — HOSPITAL ENCOUNTER (OUTPATIENT)
Facility: HOSPITAL | Age: 61
Discharge: HOME | End: 2021-10-12
Attending: INTERNAL MEDICINE | Admitting: INTERNAL MEDICINE
Payer: MEDICAID

## 2021-10-12 VITALS
BODY MASS INDEX: 24.34 KG/M2 | HEIGHT: 70 IN | RESPIRATION RATE: 18 BRPM | SYSTOLIC BLOOD PRESSURE: 115 MMHG | DIASTOLIC BLOOD PRESSURE: 63 MMHG | HEART RATE: 61 BPM | WEIGHT: 170 LBS | TEMPERATURE: 98 F | OXYGEN SATURATION: 97 %

## 2021-10-12 DIAGNOSIS — D64.9 ANEMIA, UNSPECIFIED TYPE: ICD-10-CM

## 2021-10-12 DIAGNOSIS — L98.9 SKIN LESION: Primary | ICD-10-CM

## 2021-10-12 DIAGNOSIS — I73.9 PAD (PERIPHERAL ARTERY DISEASE): ICD-10-CM

## 2021-10-12 LAB
ABO + RH BLD: NORMAL
ALBUMIN SERPL BCP-MCNC: 3.6 G/DL (ref 3.5–5.2)
ALP SERPL-CCNC: 48 U/L (ref 55–135)
ALT SERPL W/O P-5'-P-CCNC: 11 U/L (ref 10–44)
ANION GAP SERPL CALC-SCNC: 7 MMOL/L (ref 8–16)
AST SERPL-CCNC: 13 U/L (ref 10–40)
BASOPHILS # BLD AUTO: 0.03 K/UL (ref 0–0.2)
BASOPHILS NFR BLD: 0.6 % (ref 0–1.9)
BILIRUB SERPL-MCNC: 0.4 MG/DL (ref 0.1–1)
BLD GP AB SCN CELLS X3 SERPL QL: NORMAL
BLD PROD TYP BPU: NORMAL
BLOOD UNIT EXPIRATION DATE: NORMAL
BLOOD UNIT TYPE CODE: 600
BLOOD UNIT TYPE: NORMAL
BUN SERPL-MCNC: 47 MG/DL (ref 6–20)
CALCIUM SERPL-MCNC: 8.6 MG/DL (ref 8.7–10.5)
CHLORIDE SERPL-SCNC: 113 MMOL/L (ref 95–110)
CO2 SERPL-SCNC: 16 MMOL/L (ref 23–29)
CODING SYSTEM: NORMAL
CREAT SERPL-MCNC: 2.8 MG/DL (ref 0.5–1.4)
DIFFERENTIAL METHOD: ABNORMAL
DISPENSE STATUS: NORMAL
EOSINOPHIL # BLD AUTO: 0.2 K/UL (ref 0–0.5)
EOSINOPHIL NFR BLD: 4.2 % (ref 0–8)
ERYTHROCYTE [DISTWIDTH] IN BLOOD BY AUTOMATED COUNT: 12.9 % (ref 11.5–14.5)
EST. GFR  (AFRICAN AMERICAN): 27 ML/MIN/1.73 M^2
EST. GFR  (NON AFRICAN AMERICAN): 23 ML/MIN/1.73 M^2
GLUCOSE SERPL-MCNC: 104 MG/DL (ref 70–110)
HCT VFR BLD AUTO: 19.3 % (ref 40–54)
HGB BLD-MCNC: 6.4 G/DL (ref 14–18)
IGA SERPL-MCNC: 227 MG/DL (ref 40–350)
IGG SERPL-MCNC: 959 MG/DL (ref 650–1600)
IGM SERPL-MCNC: 62 MG/DL (ref 50–300)
IMM GRANULOCYTES # BLD AUTO: 0.01 K/UL (ref 0–0.04)
IMM GRANULOCYTES NFR BLD AUTO: 0.2 % (ref 0–0.5)
LYMPHOCYTES # BLD AUTO: 1.3 K/UL (ref 1–4.8)
LYMPHOCYTES NFR BLD: 28.5 % (ref 18–48)
MCH RBC QN AUTO: 28.4 PG (ref 27–31)
MCHC RBC AUTO-ENTMCNC: 33.2 G/DL (ref 32–36)
MCV RBC AUTO: 86 FL (ref 82–98)
MONOCYTES # BLD AUTO: 0.4 K/UL (ref 0.3–1)
MONOCYTES NFR BLD: 9.1 % (ref 4–15)
NEUTROPHILS # BLD AUTO: 2.7 K/UL (ref 1.8–7.7)
NEUTROPHILS NFR BLD: 57.4 % (ref 38–73)
NRBC BLD-RTO: 0 /100 WBC
PLATELET # BLD AUTO: 130 K/UL (ref 150–450)
PMV BLD AUTO: 10.1 FL (ref 9.2–12.9)
POTASSIUM SERPL-SCNC: 4.9 MMOL/L (ref 3.5–5.1)
PROT SERPL-MCNC: 6.6 G/DL (ref 6–8.4)
RBC # BLD AUTO: 2.25 M/UL (ref 4.6–6.2)
SODIUM SERPL-SCNC: 136 MMOL/L (ref 136–145)
TRANS ERYTHROCYTES VOL PATIENT: NORMAL ML
URATE SERPL-MCNC: 9 MG/DL (ref 3.4–7)
WBC # BLD AUTO: 4.71 K/UL (ref 3.9–12.7)

## 2021-10-12 PROCEDURE — 84165 PROTEIN E-PHORESIS SERUM: CPT | Mod: 26,,, | Performed by: PATHOLOGY

## 2021-10-12 PROCEDURE — 86920 COMPATIBILITY TEST SPIN: CPT | Performed by: INTERNAL MEDICINE

## 2021-10-12 PROCEDURE — 88237 TISSUE CULTURE BONE MARROW: CPT | Performed by: INTERNAL MEDICINE

## 2021-10-12 PROCEDURE — 88189 PR  FLOWCYTOMETRY/READ, 16 & > MARKERS: ICD-10-PCS | Mod: ,,, | Performed by: PATHOLOGY

## 2021-10-12 PROCEDURE — 88185 FLOWCYTOMETRY/TC ADD-ON: CPT | Mod: 59 | Performed by: PATHOLOGY

## 2021-10-12 PROCEDURE — 88305 TISSUE EXAM BY PATHOLOGIST: CPT | Mod: 26,,, | Performed by: PATHOLOGY

## 2021-10-12 PROCEDURE — 86334 PATHOLOGIST INTERPRETATION IFE: ICD-10-PCS | Mod: 26,,, | Performed by: PATHOLOGY

## 2021-10-12 PROCEDURE — 38222 DX BONE MARROW BX & ASPIR: CPT | Mod: LT,,, | Performed by: INTERNAL MEDICINE

## 2021-10-12 PROCEDURE — 88305 TISSUE EXAM BY PATHOLOGIST: ICD-10-PCS | Mod: 26,,, | Performed by: PATHOLOGY

## 2021-10-12 PROCEDURE — 88313 SPECIAL STAINS GROUP 2: CPT | Mod: 59 | Performed by: PATHOLOGY

## 2021-10-12 PROCEDURE — 38222 PR BONE MARROW BIOPSY(IES) W/ASPIRATION(S); DIAGNOSTIC: ICD-10-PCS | Mod: LT,,, | Performed by: INTERNAL MEDICINE

## 2021-10-12 PROCEDURE — 88264 CHROMOSOME ANALYSIS 20-25: CPT | Performed by: INTERNAL MEDICINE

## 2021-10-12 PROCEDURE — 25000003 PHARM REV CODE 250: Performed by: INTERNAL MEDICINE

## 2021-10-12 PROCEDURE — 38221 DX BONE MARROW BIOPSIES: CPT | Performed by: INTERNAL MEDICINE

## 2021-10-12 PROCEDURE — 85097 PR  BONE MARROW,SMEAR INTERPRETATION: ICD-10-PCS | Mod: ,,, | Performed by: PATHOLOGY

## 2021-10-12 PROCEDURE — 88184 FLOWCYTOMETRY/ TC 1 MARKER: CPT | Performed by: PATHOLOGY

## 2021-10-12 PROCEDURE — 85097 BONE MARROW INTERPRETATION: CPT | Mod: ,,, | Performed by: PATHOLOGY

## 2021-10-12 PROCEDURE — 85025 COMPLETE CBC W/AUTO DIFF WBC: CPT | Performed by: INTERNAL MEDICINE

## 2021-10-12 PROCEDURE — 88313 PR  SPECIAL STAINS,GROUP II: ICD-10-PCS | Mod: 26,,, | Performed by: PATHOLOGY

## 2021-10-12 PROCEDURE — 38222 DX BONE MARROW BX & ASPIR: CPT | Mod: LT | Performed by: INTERNAL MEDICINE

## 2021-10-12 PROCEDURE — 88271 CYTOGENETICS DNA PROBE: CPT | Performed by: INTERNAL MEDICINE

## 2021-10-12 PROCEDURE — 82668 ASSAY OF ERYTHROPOIETIN: CPT | Performed by: INTERNAL MEDICINE

## 2021-10-12 PROCEDURE — 84165 PROTEIN E-PHORESIS SERUM: CPT | Performed by: INTERNAL MEDICINE

## 2021-10-12 PROCEDURE — 86334 IMMUNOFIX E-PHORESIS SERUM: CPT | Mod: 26,,, | Performed by: PATHOLOGY

## 2021-10-12 PROCEDURE — 88299 UNLISTED CYTOGENETIC STUDY: CPT | Performed by: INTERNAL MEDICINE

## 2021-10-12 PROCEDURE — 83520 IMMUNOASSAY QUANT NOS NONAB: CPT | Performed by: INTERNAL MEDICINE

## 2021-10-12 PROCEDURE — 88342 CHG IMMUNOCYTOCHEMISTRY: ICD-10-PCS | Mod: 26,59,, | Performed by: PATHOLOGY

## 2021-10-12 PROCEDURE — 86900 BLOOD TYPING SEROLOGIC ABO: CPT | Performed by: INTERNAL MEDICINE

## 2021-10-12 PROCEDURE — 88189 FLOWCYTOMETRY/READ 16 & >: CPT | Mod: ,,, | Performed by: PATHOLOGY

## 2021-10-12 PROCEDURE — 88313 SPECIAL STAINS GROUP 2: CPT | Mod: 26,,, | Performed by: PATHOLOGY

## 2021-10-12 PROCEDURE — 80053 COMPREHEN METABOLIC PANEL: CPT | Performed by: INTERNAL MEDICINE

## 2021-10-12 PROCEDURE — 88305 TISSUE EXAM BY PATHOLOGIST: CPT | Performed by: PATHOLOGY

## 2021-10-12 PROCEDURE — 84165 PATHOLOGIST INTERPRETATION SPE: ICD-10-PCS | Mod: 26,,, | Performed by: PATHOLOGY

## 2021-10-12 PROCEDURE — P9021 RED BLOOD CELLS UNIT: HCPCS | Performed by: INTERNAL MEDICINE

## 2021-10-12 PROCEDURE — 63600175 PHARM REV CODE 636 W HCPCS: Performed by: INTERNAL MEDICINE

## 2021-10-12 PROCEDURE — 84550 ASSAY OF BLOOD/URIC ACID: CPT | Performed by: INTERNAL MEDICINE

## 2021-10-12 PROCEDURE — 88341 IMHCHEM/IMCYTCHM EA ADD ANTB: CPT | Performed by: PATHOLOGY

## 2021-10-12 PROCEDURE — 88341 IMHCHEM/IMCYTCHM EA ADD ANTB: CPT | Mod: 26,59,, | Performed by: PATHOLOGY

## 2021-10-12 PROCEDURE — 88311 PR  DECALCIFY TISSUE: ICD-10-PCS | Mod: 26,,, | Performed by: PATHOLOGY

## 2021-10-12 PROCEDURE — 36415 COLL VENOUS BLD VENIPUNCTURE: CPT | Performed by: INTERNAL MEDICINE

## 2021-10-12 PROCEDURE — 88311 DECALCIFY TISSUE: CPT | Mod: 26,,, | Performed by: PATHOLOGY

## 2021-10-12 PROCEDURE — 88341 PR IHC OR ICC EACH ADD'L SINGLE ANTIBODY  STAINPR: ICD-10-PCS | Mod: 26,59,, | Performed by: PATHOLOGY

## 2021-10-12 PROCEDURE — 88342 IMHCHEM/IMCYTCHM 1ST ANTB: CPT | Mod: 26,59,, | Performed by: PATHOLOGY

## 2021-10-12 PROCEDURE — 82784 ASSAY IGA/IGD/IGG/IGM EACH: CPT | Performed by: INTERNAL MEDICINE

## 2021-10-12 PROCEDURE — 86334 IMMUNOFIX E-PHORESIS SERUM: CPT | Performed by: INTERNAL MEDICINE

## 2021-10-12 PROCEDURE — 88342 IMHCHEM/IMCYTCHM 1ST ANTB: CPT | Performed by: PATHOLOGY

## 2021-10-12 PROCEDURE — 88311 DECALCIFY TISSUE: CPT | Performed by: PATHOLOGY

## 2021-10-12 RX ORDER — FENTANYL CITRATE 50 UG/ML
100 INJECTION, SOLUTION INTRAMUSCULAR; INTRAVENOUS ONCE AS NEEDED
Status: COMPLETED | OUTPATIENT
Start: 2021-10-12 | End: 2021-10-12

## 2021-10-12 RX ORDER — HYDROCODONE BITARTRATE AND ACETAMINOPHEN 500; 5 MG/1; MG/1
TABLET ORAL
Status: DISCONTINUED | OUTPATIENT
Start: 2021-10-12 | End: 2021-10-12 | Stop reason: HOSPADM

## 2021-10-12 RX ORDER — MIDAZOLAM HYDROCHLORIDE 5 MG/ML
5 INJECTION INTRAMUSCULAR; INTRAVENOUS ONCE AS NEEDED
Status: COMPLETED | OUTPATIENT
Start: 2021-10-12 | End: 2021-10-12

## 2021-10-12 RX ORDER — LIDOCAINE HYDROCHLORIDE 10 MG/ML
1 INJECTION, SOLUTION EPIDURAL; INFILTRATION; INTRACAUDAL; PERINEURAL ONCE
Status: DISCONTINUED | OUTPATIENT
Start: 2021-10-12 | End: 2021-10-12 | Stop reason: HOSPADM

## 2021-10-12 RX ORDER — SODIUM CHLORIDE 9 MG/ML
INJECTION, SOLUTION INTRAVENOUS CONTINUOUS
Status: DISCONTINUED | OUTPATIENT
Start: 2021-10-12 | End: 2021-10-12 | Stop reason: HOSPADM

## 2021-10-12 RX ADMIN — SODIUM CHLORIDE: 0.9 INJECTION, SOLUTION INTRAVENOUS at 08:10

## 2021-10-12 RX ADMIN — FENTANYL CITRATE 50 MCG: 50 INJECTION INTRAMUSCULAR; INTRAVENOUS at 08:10

## 2021-10-12 RX ADMIN — MIDAZOLAM HYDROCHLORIDE 3 MG: 5 INJECTION, SOLUTION INTRAMUSCULAR; INTRAVENOUS at 08:10

## 2021-10-12 RX ADMIN — SODIUM CHLORIDE: 0.9 INJECTION, SOLUTION INTRAVENOUS at 06:10

## 2021-10-13 ENCOUNTER — PATIENT MESSAGE (OUTPATIENT)
Dept: HEMATOLOGY/ONCOLOGY | Facility: CLINIC | Age: 61
End: 2021-10-13
Payer: MEDICAID

## 2021-10-13 LAB
ALBUMIN SERPL ELPH-MCNC: 3.69 G/DL (ref 3.35–5.55)
ALPHA1 GLOB SERPL ELPH-MCNC: 0.32 G/DL (ref 0.17–0.41)
ALPHA2 GLOB SERPL ELPH-MCNC: 0.73 G/DL (ref 0.43–0.99)
B-GLOBULIN SERPL ELPH-MCNC: 0.67 G/DL (ref 0.5–1.1)
BODY SITE - BONE MARROW: NORMAL
CLINICAL DIAGNOSIS - BONE MARROW: NORMAL
FLOW CYTOMETRY ANTIBODIES ANALYZED - BONE MARROW: NORMAL
FLOW CYTOMETRY COMMENT - BONE MARROW: NORMAL
FLOW CYTOMETRY INTERPRETATION - BONE MARROW: NORMAL
GAMMA GLOB SERPL ELPH-MCNC: 0.89 G/DL (ref 0.67–1.58)
INTERPRETATION SERPL IFE-IMP: NORMAL
KAPPA LC SER QL IA: 10.43 MG/DL (ref 0.33–1.94)
KAPPA LC/LAMBDA SER IA: 2.69 (ref 0.26–1.65)
LAMBDA LC SER QL IA: 3.88 MG/DL (ref 0.57–2.63)
PATHOLOGIST INTERPRETATION IFE: NORMAL
PATHOLOGIST INTERPRETATION SPE: NORMAL
PROT SERPL-MCNC: 6.3 G/DL (ref 6–8.4)

## 2021-10-14 LAB
DNA/RNA EXTRACT AND HOLD RESULT: NORMAL
DNA/RNA EXTRACTION: NORMAL
EXHR SPECIMEN TYPE: NORMAL

## 2021-10-15 LAB — EPO SERPL-ACNC: 9.1 MIU/ML (ref 2.6–18.5)

## 2021-10-21 LAB
CHROM BANDING METHOD: NORMAL
CHROMOSOME ANALYSIS BM ADDITIONAL INFORMATION: NORMAL
CHROMOSOME ANALYSIS BM RELEASED BY: NORMAL
CHROMOSOME ANALYSIS BM RESULT SUMMARY: NORMAL
CLINICAL CYTOGENETICIST REVIEW: NORMAL
CLINICAL CYTOGENETICIST REVIEW: NORMAL
FMDS SPECIMEN: NORMAL
KARYOTYP MAR: NORMAL
MDS FISH ADDITIONAL INFORMATION: NORMAL
MDS FISH DISCLAIMER: NORMAL
MDS FISH REASON FOR REFERRAL (BM): NORMAL
MDS FISH RELEASED BY: NORMAL
MDS FISH RESULT (BM): NORMAL
MDS FISH RESULT SUMMARY: NORMAL
MDS FISH RESULT TABLE: NORMAL
REASON FOR REFERRAL (NARRATIVE): NORMAL
REF LAB TEST METHOD: NORMAL
REF LAB TEST METHOD: NORMAL
SPECIMEN SOURCE: NORMAL
SPECIMEN SOURCE: NORMAL
SPECIMEN: NORMAL

## 2021-10-25 RX ORDER — NIFEDIPINE 60 MG/1
60 TABLET, EXTENDED RELEASE ORAL DAILY
Qty: 30 TABLET | Refills: 2 | Status: SHIPPED | OUTPATIENT
Start: 2021-10-25 | End: 2021-12-09 | Stop reason: SDUPTHER

## 2021-10-26 ENCOUNTER — PATIENT OUTREACH (OUTPATIENT)
Dept: ADMINISTRATIVE | Facility: OTHER | Age: 61
End: 2021-10-26
Payer: MEDICAID

## 2021-10-26 ENCOUNTER — OFFICE VISIT (OUTPATIENT)
Dept: HEMATOLOGY/ONCOLOGY | Facility: CLINIC | Age: 61
End: 2021-10-26
Payer: MEDICAID

## 2021-10-26 VITALS
WEIGHT: 179.25 LBS | OXYGEN SATURATION: 99 % | DIASTOLIC BLOOD PRESSURE: 58 MMHG | RESPIRATION RATE: 20 BRPM | HEART RATE: 60 BPM | BODY MASS INDEX: 25.66 KG/M2 | TEMPERATURE: 97 F | SYSTOLIC BLOOD PRESSURE: 118 MMHG | HEIGHT: 70 IN

## 2021-10-26 DIAGNOSIS — N18.32 ANEMIA IN STAGE 3B CHRONIC KIDNEY DISEASE: ICD-10-CM

## 2021-10-26 DIAGNOSIS — N18.4 CKD (CHRONIC KIDNEY DISEASE), STAGE IV: ICD-10-CM

## 2021-10-26 DIAGNOSIS — D63.1 ANEMIA IN STAGE 3B CHRONIC KIDNEY DISEASE: ICD-10-CM

## 2021-10-26 DIAGNOSIS — N18.32 STAGE 3B CHRONIC KIDNEY DISEASE: ICD-10-CM

## 2021-10-26 DIAGNOSIS — D63.8 ANEMIA OF CHRONIC DISEASE: Primary | ICD-10-CM

## 2021-10-26 DIAGNOSIS — D50.9 IRON DEFICIENCY ANEMIA, UNSPECIFIED IRON DEFICIENCY ANEMIA TYPE: ICD-10-CM

## 2021-10-26 LAB
FINAL PATHOLOGIC DIAGNOSIS: NORMAL
GROSS: NORMAL
Lab: NORMAL
MICROSCOPIC EXAM: NORMAL
SUPPLEMENTAL DIAGNOSIS: NORMAL

## 2021-10-26 PROCEDURE — 99999 PR PBB SHADOW E&M-EST. PATIENT-LVL IV: CPT | Mod: PBBFAC,,, | Performed by: INTERNAL MEDICINE

## 2021-10-26 PROCEDURE — 99999 PR PBB SHADOW E&M-EST. PATIENT-LVL IV: ICD-10-PCS | Mod: PBBFAC,,, | Performed by: INTERNAL MEDICINE

## 2021-10-26 PROCEDURE — 99215 PR OFFICE/OUTPT VISIT, EST, LEVL V, 40-54 MIN: ICD-10-PCS | Mod: S$PBB,,, | Performed by: INTERNAL MEDICINE

## 2021-10-26 PROCEDURE — 99215 OFFICE O/P EST HI 40 MIN: CPT | Mod: S$PBB,,, | Performed by: INTERNAL MEDICINE

## 2021-10-26 PROCEDURE — 99214 OFFICE O/P EST MOD 30 MIN: CPT | Mod: PBBFAC,PO | Performed by: INTERNAL MEDICINE

## 2021-10-26 RX ORDER — HEPARIN 100 UNIT/ML
500 SYRINGE INTRAVENOUS
Status: CANCELLED | OUTPATIENT
Start: 2021-12-14

## 2021-10-26 RX ORDER — HEPARIN 100 UNIT/ML
500 SYRINGE INTRAVENOUS
Status: CANCELLED | OUTPATIENT
Start: 2021-11-25

## 2021-10-26 RX ORDER — SODIUM CHLORIDE 0.9 % (FLUSH) 0.9 %
10 SYRINGE (ML) INJECTION
Status: CANCELLED | OUTPATIENT
Start: 2021-12-14

## 2021-10-26 RX ORDER — SODIUM CHLORIDE 0.9 % (FLUSH) 0.9 %
10 SYRINGE (ML) INJECTION
Status: CANCELLED | OUTPATIENT
Start: 2021-10-26

## 2021-10-26 RX ORDER — SODIUM CHLORIDE 0.9 % (FLUSH) 0.9 %
10 SYRINGE (ML) INJECTION
Status: CANCELLED | OUTPATIENT
Start: 2021-11-25

## 2021-10-26 RX ORDER — SODIUM CHLORIDE 0.9 % (FLUSH) 0.9 %
10 SYRINGE (ML) INJECTION
Status: CANCELLED | OUTPATIENT
Start: 2021-11-11

## 2021-10-26 RX ORDER — ATORVASTATIN CALCIUM 80 MG/1
80 TABLET, FILM COATED ORAL DAILY
COMMUNITY
Start: 2021-10-14 | End: 2022-02-16

## 2021-10-26 RX ORDER — SODIUM CHLORIDE 0.9 % (FLUSH) 0.9 %
10 SYRINGE (ML) INJECTION
Status: CANCELLED | OUTPATIENT
Start: 2021-11-30

## 2021-10-26 RX ORDER — SODIUM CHLORIDE 0.9 % (FLUSH) 0.9 %
10 SYRINGE (ML) INJECTION
Status: CANCELLED | OUTPATIENT
Start: 2021-12-07

## 2021-10-26 RX ORDER — SODIUM CHLORIDE 0.9 % (FLUSH) 0.9 %
10 SYRINGE (ML) INJECTION
Status: CANCELLED | OUTPATIENT
Start: 2021-11-18

## 2021-10-26 RX ORDER — HEPARIN 100 UNIT/ML
500 SYRINGE INTRAVENOUS
Status: CANCELLED | OUTPATIENT
Start: 2021-11-30

## 2021-10-26 RX ORDER — HEPARIN 100 UNIT/ML
500 SYRINGE INTRAVENOUS
Status: CANCELLED | OUTPATIENT
Start: 2021-11-11

## 2021-10-26 RX ORDER — HEPARIN 100 UNIT/ML
500 SYRINGE INTRAVENOUS
Status: CANCELLED | OUTPATIENT
Start: 2021-12-07

## 2021-10-26 RX ORDER — HEPARIN 100 UNIT/ML
500 SYRINGE INTRAVENOUS
Status: CANCELLED | OUTPATIENT
Start: 2021-10-26

## 2021-10-26 RX ORDER — HEPARIN 100 UNIT/ML
500 SYRINGE INTRAVENOUS
Status: CANCELLED | OUTPATIENT
Start: 2021-11-18

## 2021-10-27 ENCOUNTER — OFFICE VISIT (OUTPATIENT)
Dept: CARDIOLOGY | Facility: CLINIC | Age: 61
End: 2021-10-27
Payer: MEDICAID

## 2021-10-27 VITALS
HEIGHT: 70 IN | OXYGEN SATURATION: 98 % | HEART RATE: 64 BPM | DIASTOLIC BLOOD PRESSURE: 64 MMHG | SYSTOLIC BLOOD PRESSURE: 110 MMHG | WEIGHT: 178 LBS | BODY MASS INDEX: 25.48 KG/M2

## 2021-10-27 DIAGNOSIS — I65.23 BILATERAL CAROTID ARTERY STENOSIS: ICD-10-CM

## 2021-10-27 DIAGNOSIS — I73.9 PAD (PERIPHERAL ARTERY DISEASE): ICD-10-CM

## 2021-10-27 DIAGNOSIS — I73.9 CLAUDICATION IN PERIPHERAL VASCULAR DISEASE: ICD-10-CM

## 2021-10-27 DIAGNOSIS — N18.32 STAGE 3B CHRONIC KIDNEY DISEASE: ICD-10-CM

## 2021-10-27 DIAGNOSIS — I25.10 CORONARY ARTERY DISEASE INVOLVING NATIVE CORONARY ARTERY OF NATIVE HEART WITHOUT ANGINA PECTORIS: ICD-10-CM

## 2021-10-27 DIAGNOSIS — I10 PRIMARY HYPERTENSION: ICD-10-CM

## 2021-10-27 DIAGNOSIS — I70.213 ATHEROSCLEROSIS OF NATIVE ARTERY OF BOTH LOWER EXTREMITIES WITH INTERMITTENT CLAUDICATION: Primary | ICD-10-CM

## 2021-10-27 PROCEDURE — 99215 OFFICE O/P EST HI 40 MIN: CPT | Mod: PBBFAC,PO | Performed by: INTERNAL MEDICINE

## 2021-10-27 PROCEDURE — 99999 PR PBB SHADOW E&M-EST. PATIENT-LVL V: ICD-10-PCS | Mod: PBBFAC,,, | Performed by: INTERNAL MEDICINE

## 2021-10-27 PROCEDURE — 99215 OFFICE O/P EST HI 40 MIN: CPT | Mod: S$PBB,,, | Performed by: INTERNAL MEDICINE

## 2021-10-27 PROCEDURE — 99999 PR PBB SHADOW E&M-EST. PATIENT-LVL V: CPT | Mod: PBBFAC,,, | Performed by: INTERNAL MEDICINE

## 2021-10-27 PROCEDURE — 99215 PR OFFICE/OUTPT VISIT, EST, LEVL V, 40-54 MIN: ICD-10-PCS | Mod: S$PBB,,, | Performed by: INTERNAL MEDICINE

## 2021-11-03 ENCOUNTER — TELEPHONE (OUTPATIENT)
Dept: INFUSION THERAPY | Facility: HOSPITAL | Age: 61
End: 2021-11-03
Payer: MEDICAID

## 2021-11-08 ENCOUNTER — OFFICE VISIT (OUTPATIENT)
Dept: GASTROENTEROLOGY | Facility: CLINIC | Age: 61
End: 2021-11-08
Payer: MEDICAID

## 2021-11-08 VITALS
HEART RATE: 71 BPM | WEIGHT: 183 LBS | BODY MASS INDEX: 26.2 KG/M2 | SYSTOLIC BLOOD PRESSURE: 104 MMHG | HEIGHT: 70 IN | DIASTOLIC BLOOD PRESSURE: 54 MMHG

## 2021-11-08 DIAGNOSIS — Z12.12 ENCOUNTER FOR COLORECTAL CANCER SCREENING: ICD-10-CM

## 2021-11-08 DIAGNOSIS — Z12.11 ENCOUNTER FOR COLORECTAL CANCER SCREENING: ICD-10-CM

## 2021-11-08 DIAGNOSIS — R19.5 POSITIVE FIT (FECAL IMMUNOCHEMICAL TEST): ICD-10-CM

## 2021-11-08 DIAGNOSIS — D64.9 NORMOCYTIC ANEMIA: Primary | ICD-10-CM

## 2021-11-08 PROCEDURE — 99999 PR PBB SHADOW E&M-EST. PATIENT-LVL IV: CPT | Mod: PBBFAC,,, | Performed by: STUDENT IN AN ORGANIZED HEALTH CARE EDUCATION/TRAINING PROGRAM

## 2021-11-08 PROCEDURE — 99203 OFFICE O/P NEW LOW 30 MIN: CPT | Mod: S$PBB,,, | Performed by: STUDENT IN AN ORGANIZED HEALTH CARE EDUCATION/TRAINING PROGRAM

## 2021-11-08 PROCEDURE — 99999 PR PBB SHADOW E&M-EST. PATIENT-LVL IV: ICD-10-PCS | Mod: PBBFAC,,, | Performed by: STUDENT IN AN ORGANIZED HEALTH CARE EDUCATION/TRAINING PROGRAM

## 2021-11-08 PROCEDURE — 99214 OFFICE O/P EST MOD 30 MIN: CPT | Mod: PBBFAC | Performed by: STUDENT IN AN ORGANIZED HEALTH CARE EDUCATION/TRAINING PROGRAM

## 2021-11-08 PROCEDURE — 99203 PR OFFICE/OUTPT VISIT, NEW, LEVL III, 30-44 MIN: ICD-10-PCS | Mod: S$PBB,,, | Performed by: STUDENT IN AN ORGANIZED HEALTH CARE EDUCATION/TRAINING PROGRAM

## 2021-11-08 NOTE — SUBJECTIVE & OBJECTIVE
Past Medical History:   Diagnosis Date    Allergy     Arthritis     Coronary artery disease     Heart attack 10/04/2019    Hemothorax     Hyperlipidemia     Hypertension     PAD (peripheral artery disease)        Past Surgical History:   Procedure Laterality Date    ABDOMINAL AORTOGRAPHY N/A 5/10/2019    Procedure: AORTOGRAM-ABDOMINAL;  Surgeon: Keo Jenkins MD;  Location: [facility] CATH LAB/EP;  Service: Cardiology;  Laterality: N/A;  RSFA intervention     CARDIAC CATHETERIZATION      CORONARY ANGIOGRAPHY N/A 3/29/2019    Procedure: ANGIOGRAM, CORONARY ARTERY;  Surgeon: Keo Jenkins MD;  Location: [facility] CATH LAB/EP;  Service: Cardiology;  Laterality: N/A;    CORONARY ANGIOGRAPHY Left 10/11/2019    Procedure: ANGIOGRAM, CORONARY ARTERY;  Surgeon: Keo Jenkins MD;  Location: [facility] CATH LAB/EP;  Service: Cardiology;  Laterality: Left;    CORONARY ANGIOPLASTY WITH STENT PLACEMENT  03/29/2019    mid and distal RCA    EYE SURGERY      LEFT HEART CATHETERIZATION N/A 3/29/2019    Procedure: Left heart cath;  Surgeon: Keo Jenkins MD;  Location: [facility] CATH LAB/EP;  Service: Cardiology;  Laterality: N/A;    LEFT HEART CATHETERIZATION Left 10/8/2019    Procedure: Left heart cath;  Surgeon: Keo Jenkins MD;  Location: [facility] CATH LAB/EP;  Service: Cardiology;  Laterality: Left;    PERCUTANEOUS TRANSLUMINAL ANGIOPLASTY (PTA) OF PERIPHERAL VESSEL N/A 7/12/2019    Procedure: PTA, PERIPHERAL VESSEL;  Surgeon: Keo Jenkins MD;  Location: [facility] CATH LAB/EP;  Service: Cardiology;  Laterality: N/A;       Review of patient's allergies indicates:   Allergen Reactions    Ace inhibitors Rash       No current facility-administered medications on file prior to encounter.      Current Outpatient Medications on File Prior to Encounter   Medication Sig    albuterol (PROVENTIL/VENTOLIN HFA) 90 mcg/actuation inhaler INHALE 2 PUFFS INTO THE LUNGS EVERY 6 HOURS AS NEEDED FOR WHEEZING    ASPIRIN (ASPIR-81 ORAL) Take 1  tablet by mouth.    atorvastatin (LIPITOR) 80 MG tablet Take 1 tablet (80 mg total) by mouth once daily.    cilostazol (PLETAL) 100 MG Tab TAKE 1 TABLET(100 MG) BY MOUTH TWICE DAILY    clopidogrel (PLAVIX) 75 mg tablet Take 1 tablet (75 mg total) by mouth once daily.    coenzyme Q10 (COQ-10) 100 mg capsule Take 1 capsule (100 mg total) by mouth once daily.    flunisolide 25 mcg, 0.025%, (NASALIDE) 25 mcg (0.025 %) Spry 2 sprays by Nasal route 2 (two) times daily.    hydroCHLOROthiazide (HYDRODIURIL) 25 MG tablet Take 1 tablet (25 mg total) by mouth once daily.    losartan (COZAAR) 100 MG tablet Take 1 tablet (100 mg total) by mouth once daily.    metoprolol succinate (TOPROL-XL) 100 MG 24 hr tablet Take 1 tablet (100 mg total) by mouth 2 (two) times daily.     Family History     Problem Relation (Age of Onset)    Cancer Father        Tobacco Use    Smoking status: Former Smoker     Types: Cigarettes     Last attempt to quit: 1999     Years since quittin.7    Smokeless tobacco: Never Used   Substance and Sexual Activity    Alcohol use: No     Alcohol/week: 0.0 standard drinks     Frequency: Never     Drinks per session: Patient refused     Binge frequency: Patient refused    Drug use: No    Sexual activity: Yes     Partners: Female     Review of Systems   Constitutional: Negative for chills and fever.   HENT: Negative for congestion and postnasal drip.    Eyes: Negative for visual disturbance.   Respiratory: Negative for chest tightness and shortness of breath.    Cardiovascular: Positive for chest pain.   Gastrointestinal: Negative for abdominal distention, abdominal pain, nausea and vomiting.   Genitourinary: Negative for difficulty urinating.   Musculoskeletal: Negative for arthralgias and myalgias.   Skin: Negative for color change and wound.   Neurological: Negative for dizziness and light-headedness.   Hematological: Does not bruise/bleed easily.   Psychiatric/Behavioral: Negative for  agitation.     Objective:     Vital Signs (Most Recent):  Temp: 98 °F (36.7 °C) (01/30/20 2131)  Pulse: 78 (01/30/20 2225)  Resp: 13 (01/30/20 2031)  BP: (!) 166/78 (01/30/20 2225)  SpO2: 99 % (01/30/20 1917) Vital Signs (24h Range):  Temp:  [98 °F (36.7 °C)-98.1 °F (36.7 °C)] 98 °F (36.7 °C)  Pulse:  [60-78] 78  Resp:  [13-17] 13  SpO2:  [99 %] 99 %  BP: (166-187)/(78-90) 166/78     Weight: 86.2 kg (190 lb)  Body mass index is 27.26 kg/m².    Physical Exam   Constitutional: He is oriented to person, place, and time. He appears well-developed and well-nourished.   HENT:   Head: Normocephalic and atraumatic.   Eyes: Pupils are equal, round, and reactive to light. EOM are normal.   Neck: Normal range of motion. Neck supple.   Cardiovascular: Normal rate.   No murmur heard.  Pulmonary/Chest: Effort normal and breath sounds normal.   Abdominal: Soft. Bowel sounds are normal.   Musculoskeletal: Normal range of motion. He exhibits no edema or deformity.   Neurological: He is alert and oriented to person, place, and time.   Skin: Skin is warm and dry.   Psychiatric: He has a normal mood and affect.         CRANIAL NERVES     CN III, IV, VI   Pupils are equal, round, and reactive to light.  Extraocular motions are normal.        Significant Labs:   A1C:   Recent Labs   Lab 10/28/19  1016   HGBA1C 5.7*     BMP:   Recent Labs   Lab 01/30/20 1957   GLU 86      K 3.0*      CO2 27   BUN 28*   CREATININE 1.6*   CALCIUM 8.5*     CBC:   Recent Labs   Lab 01/30/20 1957   WBC 5.95   HGB 10.4*   HCT 31.1*        CMP:   Recent Labs   Lab 01/30/20 1957      K 3.0*      CO2 27   GLU 86   BUN 28*   CREATININE 1.6*   CALCIUM 8.5*   PROT 5.7*   ALBUMIN 2.7*   BILITOT 0.2   ALKPHOS 84   AST 26   ALT 16   ANIONGAP 10   EGFRNONAA 47*     Cardiac Markers:   Recent Labs   Lab 01/30/20 1957   BNP 67     Coagulation:   Recent Labs   Lab 01/30/20 1957   INR 1.0     Troponin:   Recent Labs   Lab 01/30/20 1957    TROPONINI 0.034*     TSH:   Recent Labs   Lab 10/05/19  0449   TSH 3.421       Significant Imaging: I have reviewed all pertinent imaging results/findings within the past 24 hours.   Yes

## 2021-11-09 ENCOUNTER — HOSPITAL ENCOUNTER (OUTPATIENT)
Dept: CARDIOLOGY | Facility: HOSPITAL | Age: 61
Discharge: HOME | End: 2021-11-09
Attending: INTERNAL MEDICINE
Payer: MEDICAID

## 2021-11-09 ENCOUNTER — HOSPITAL ENCOUNTER (OUTPATIENT)
Dept: RADIOLOGY | Facility: HOSPITAL | Age: 61
Discharge: HOME | End: 2021-11-09
Attending: INTERNAL MEDICINE
Payer: MEDICAID

## 2021-11-09 DIAGNOSIS — I73.9 PAD (PERIPHERAL ARTERY DISEASE): ICD-10-CM

## 2021-11-09 PROCEDURE — 93924 LWR XTR VASC STDY BILAT: CPT | Mod: 26,,, | Performed by: INTERNAL MEDICINE

## 2021-11-09 PROCEDURE — 93924 ANKLE BRACHIAL INDICES (ABI): ICD-10-PCS | Mod: 26,,, | Performed by: INTERNAL MEDICINE

## 2021-11-09 PROCEDURE — 93925 LOWER EXTREMITY STUDY: CPT | Mod: 26,,, | Performed by: INTERNAL MEDICINE

## 2021-11-09 PROCEDURE — 93925 US LOWER EXTREMITY ARTERIES BILATERAL: ICD-10-PCS | Mod: 26,,, | Performed by: INTERNAL MEDICINE

## 2021-11-09 PROCEDURE — 93925 LOWER EXTREMITY STUDY: CPT | Mod: TC

## 2021-11-09 PROCEDURE — 93924 LWR XTR VASC STDY BILAT: CPT

## 2021-11-10 ENCOUNTER — INFUSION (OUTPATIENT)
Dept: INFUSION THERAPY | Facility: HOSPITAL | Age: 61
End: 2021-11-10
Attending: INTERNAL MEDICINE
Payer: MEDICAID

## 2021-11-10 ENCOUNTER — PATIENT MESSAGE (OUTPATIENT)
Dept: CARDIOLOGY | Facility: CLINIC | Age: 61
End: 2021-11-10
Payer: MEDICAID

## 2021-11-10 VITALS
WEIGHT: 183 LBS | TEMPERATURE: 98 F | DIASTOLIC BLOOD PRESSURE: 67 MMHG | RESPIRATION RATE: 18 BRPM | OXYGEN SATURATION: 98 % | HEART RATE: 72 BPM | BODY MASS INDEX: 26.2 KG/M2 | HEIGHT: 70 IN | SYSTOLIC BLOOD PRESSURE: 109 MMHG

## 2021-11-10 DIAGNOSIS — N18.32 ANEMIA IN STAGE 3B CHRONIC KIDNEY DISEASE: Primary | ICD-10-CM

## 2021-11-10 DIAGNOSIS — D63.1 ANEMIA IN STAGE 3B CHRONIC KIDNEY DISEASE: Primary | ICD-10-CM

## 2021-11-10 PROCEDURE — A4216 STERILE WATER/SALINE, 10 ML: HCPCS | Performed by: INTERNAL MEDICINE

## 2021-11-10 PROCEDURE — 25000003 PHARM REV CODE 250: Performed by: INTERNAL MEDICINE

## 2021-11-10 PROCEDURE — 96365 THER/PROPH/DIAG IV INF INIT: CPT

## 2021-11-10 PROCEDURE — 63600175 PHARM REV CODE 636 W HCPCS: Performed by: INTERNAL MEDICINE

## 2021-11-10 RX ORDER — SODIUM CHLORIDE 0.9 % (FLUSH) 0.9 %
10 SYRINGE (ML) INJECTION
Status: DISCONTINUED | OUTPATIENT
Start: 2021-11-10 | End: 2021-11-10 | Stop reason: HOSPADM

## 2021-11-10 RX ORDER — HEPARIN 100 UNIT/ML
500 SYRINGE INTRAVENOUS
Status: DISCONTINUED | OUTPATIENT
Start: 2021-11-10 | End: 2021-11-10 | Stop reason: HOSPADM

## 2021-11-10 RX ADMIN — SODIUM CHLORIDE: 0.9 INJECTION, SOLUTION INTRAVENOUS at 08:11

## 2021-11-10 RX ADMIN — Medication 10 ML: at 09:11

## 2021-11-10 RX ADMIN — IRON SUCROSE 200 MG: 20 INJECTION, SOLUTION INTRAVENOUS at 08:11

## 2021-11-11 LAB
ABI POST MINUTES1: 3 MIN
ABI POST MINUTES2: 5 MIN
IMMEDIATE ARM BP: 111 MMHG
IMMEDIATE LEFT ABI: 0.32
IMMEDIATE LEFT TIBIAL: 35 MMHG
IMMEDIATE RIGHT ABI: 0.39
IMMEDIATE RIGHT TIBIAL: 43 MMHG
LEFT ABI: 0.83
LEFT ARM BP: 112 MMHG
LEFT DORSALIS PEDIS: 84 MMHG
LEFT POSTERIOR TIBIAL: 93 MMHG
LEFT TBI: 0.48
LEFT TOE PRESSURE: 54 MMHG
POST1 ARM BP: 110 MMHG
POST1 LEFT ABI: 0.42
POST1 LEFT TIBIAL: 46 MMHG
POST1 RIGHT ABI: 0.55
POST1 RIGHT TIBIAL: 60 MMHG
POST2 ARM BP: 113 MMHG
POST2 LEFT ABI: 0.48
POST2 LEFT TIBIAL: 54 MMHG
POST2 RIGHT ABI: 0.6
POST2 RIGHT TIBIAL: 68 MMHG
RIGHT ABI: 0.84
RIGHT ARM BP: 104 MMHG
RIGHT DORSALIS PEDIS: 84 MMHG
RIGHT POSTERIOR TIBIAL: 94 MMHG
RIGHT TBI: 0.58
RIGHT TOE PRESSURE: 65 MMHG
TREADMILL GRADE: 10 %
TREADMILL SPEED: 1.5 MPH
TREADMILL TIME: 3.5 MIN

## 2021-11-15 ENCOUNTER — OFFICE VISIT (OUTPATIENT)
Dept: CARDIOLOGY | Facility: CLINIC | Age: 61
End: 2021-11-15
Payer: MEDICAID

## 2021-11-15 DIAGNOSIS — D63.1 ANEMIA IN STAGE 3B CHRONIC KIDNEY DISEASE: ICD-10-CM

## 2021-11-15 DIAGNOSIS — N18.32 ANEMIA IN STAGE 3B CHRONIC KIDNEY DISEASE: ICD-10-CM

## 2021-11-15 DIAGNOSIS — I70.213 ATHEROSCLEROSIS OF NATIVE ARTERY OF BOTH LOWER EXTREMITIES WITH INTERMITTENT CLAUDICATION: Primary | ICD-10-CM

## 2021-11-15 DIAGNOSIS — I25.10 CORONARY ARTERY DISEASE INVOLVING NATIVE CORONARY ARTERY OF NATIVE HEART WITHOUT ANGINA PECTORIS: ICD-10-CM

## 2021-11-15 DIAGNOSIS — I87.2 VENOUS INSUFFICIENCY OF BOTH LOWER EXTREMITIES: ICD-10-CM

## 2021-11-15 DIAGNOSIS — I73.9 PAD (PERIPHERAL ARTERY DISEASE): ICD-10-CM

## 2021-11-15 DIAGNOSIS — I10 PRIMARY HYPERTENSION: ICD-10-CM

## 2021-11-15 DIAGNOSIS — I46.9 CARDIAC ARREST: ICD-10-CM

## 2021-11-15 PROCEDURE — 99215 OFFICE O/P EST HI 40 MIN: CPT | Mod: 95,,, | Performed by: INTERNAL MEDICINE

## 2021-11-15 PROCEDURE — 99215 PR OFFICE/OUTPT VISIT, EST, LEVL V, 40-54 MIN: ICD-10-PCS | Mod: 95,,, | Performed by: INTERNAL MEDICINE

## 2021-11-15 RX ORDER — SODIUM CHLORIDE 9 MG/ML
INJECTION, SOLUTION INTRAVENOUS CONTINUOUS
Status: CANCELLED | OUTPATIENT
Start: 2021-11-15 | End: 2021-11-15

## 2021-11-15 RX ORDER — DIPHENHYDRAMINE HCL 25 MG
50 CAPSULE ORAL ONCE
Status: CANCELLED | OUTPATIENT
Start: 2021-11-15 | End: 2021-11-15

## 2021-11-17 ENCOUNTER — INFUSION (OUTPATIENT)
Dept: INFUSION THERAPY | Facility: HOSPITAL | Age: 61
End: 2021-11-17
Payer: MEDICAID

## 2021-11-17 VITALS
BODY MASS INDEX: 26.2 KG/M2 | RESPIRATION RATE: 18 BRPM | HEART RATE: 72 BPM | WEIGHT: 183 LBS | SYSTOLIC BLOOD PRESSURE: 105 MMHG | OXYGEN SATURATION: 98 % | HEIGHT: 70 IN | DIASTOLIC BLOOD PRESSURE: 54 MMHG | TEMPERATURE: 98 F

## 2021-11-17 DIAGNOSIS — D63.1 ANEMIA IN STAGE 3B CHRONIC KIDNEY DISEASE: Primary | ICD-10-CM

## 2021-11-17 DIAGNOSIS — N18.32 ANEMIA IN STAGE 3B CHRONIC KIDNEY DISEASE: Primary | ICD-10-CM

## 2021-11-17 PROCEDURE — 25000003 PHARM REV CODE 250: Performed by: INTERNAL MEDICINE

## 2021-11-17 PROCEDURE — 63600175 PHARM REV CODE 636 W HCPCS: Performed by: INTERNAL MEDICINE

## 2021-11-17 PROCEDURE — 96365 THER/PROPH/DIAG IV INF INIT: CPT

## 2021-11-17 PROCEDURE — A4216 STERILE WATER/SALINE, 10 ML: HCPCS | Performed by: INTERNAL MEDICINE

## 2021-11-17 RX ORDER — HEPARIN 100 UNIT/ML
500 SYRINGE INTRAVENOUS
Status: DISCONTINUED | OUTPATIENT
Start: 2021-11-17 | End: 2021-11-17 | Stop reason: HOSPADM

## 2021-11-17 RX ORDER — SODIUM CHLORIDE 0.9 % (FLUSH) 0.9 %
10 SYRINGE (ML) INJECTION
Status: DISCONTINUED | OUTPATIENT
Start: 2021-11-17 | End: 2021-11-17 | Stop reason: HOSPADM

## 2021-11-17 RX ADMIN — SODIUM CHLORIDE: 0.9 INJECTION, SOLUTION INTRAVENOUS at 08:11

## 2021-11-17 RX ADMIN — Medication 10 ML: at 09:11

## 2021-11-17 RX ADMIN — IRON SUCROSE 200 MG: 20 INJECTION, SOLUTION INTRAVENOUS at 08:11

## 2021-11-20 ENCOUNTER — PATIENT MESSAGE (OUTPATIENT)
Dept: CARDIOLOGY | Facility: CLINIC | Age: 61
End: 2021-11-20
Payer: MEDICAID

## 2021-11-24 ENCOUNTER — INFUSION (OUTPATIENT)
Dept: INFUSION THERAPY | Facility: HOSPITAL | Age: 61
End: 2021-11-24
Attending: INTERNAL MEDICINE
Payer: MEDICAID

## 2021-11-24 VITALS
BODY MASS INDEX: 26.2 KG/M2 | DIASTOLIC BLOOD PRESSURE: 50 MMHG | TEMPERATURE: 98 F | HEIGHT: 70 IN | RESPIRATION RATE: 18 BRPM | HEART RATE: 75 BPM | OXYGEN SATURATION: 98 % | WEIGHT: 183 LBS | SYSTOLIC BLOOD PRESSURE: 102 MMHG

## 2021-11-24 DIAGNOSIS — N18.32 ANEMIA IN STAGE 3B CHRONIC KIDNEY DISEASE: Primary | ICD-10-CM

## 2021-11-24 DIAGNOSIS — D63.1 ANEMIA IN STAGE 3B CHRONIC KIDNEY DISEASE: Primary | ICD-10-CM

## 2021-11-24 PROCEDURE — 25000003 PHARM REV CODE 250: Performed by: INTERNAL MEDICINE

## 2021-11-24 PROCEDURE — 96365 THER/PROPH/DIAG IV INF INIT: CPT

## 2021-11-24 PROCEDURE — 63600175 PHARM REV CODE 636 W HCPCS: Performed by: INTERNAL MEDICINE

## 2021-11-24 PROCEDURE — A4216 STERILE WATER/SALINE, 10 ML: HCPCS | Performed by: INTERNAL MEDICINE

## 2021-11-24 RX ORDER — HEPARIN 100 UNIT/ML
500 SYRINGE INTRAVENOUS
Status: DISCONTINUED | OUTPATIENT
Start: 2021-11-24 | End: 2021-11-24 | Stop reason: HOSPADM

## 2021-11-24 RX ORDER — SODIUM CHLORIDE 0.9 % (FLUSH) 0.9 %
10 SYRINGE (ML) INJECTION
Status: DISCONTINUED | OUTPATIENT
Start: 2021-11-24 | End: 2021-11-24 | Stop reason: HOSPADM

## 2021-11-24 RX ADMIN — Medication 10 ML: at 09:11

## 2021-11-24 RX ADMIN — SODIUM CHLORIDE: 0.9 INJECTION, SOLUTION INTRAVENOUS at 08:11

## 2021-11-24 RX ADMIN — IRON SUCROSE 200 MG: 20 INJECTION, SOLUTION INTRAVENOUS at 08:11

## 2021-11-29 ENCOUNTER — PATIENT MESSAGE (OUTPATIENT)
Dept: CARDIOLOGY | Facility: CLINIC | Age: 61
End: 2021-11-29
Payer: MEDICAID

## 2021-12-01 ENCOUNTER — INFUSION (OUTPATIENT)
Dept: INFUSION THERAPY | Facility: HOSPITAL | Age: 61
End: 2021-12-01
Attending: INTERNAL MEDICINE
Payer: MEDICAID

## 2021-12-01 VITALS
HEART RATE: 70 BPM | DIASTOLIC BLOOD PRESSURE: 56 MMHG | SYSTOLIC BLOOD PRESSURE: 115 MMHG | WEIGHT: 183 LBS | OXYGEN SATURATION: 98 % | BODY MASS INDEX: 26.2 KG/M2 | TEMPERATURE: 98 F | RESPIRATION RATE: 18 BRPM | HEIGHT: 70 IN

## 2021-12-01 DIAGNOSIS — N18.32 ANEMIA IN STAGE 3B CHRONIC KIDNEY DISEASE: Primary | ICD-10-CM

## 2021-12-01 DIAGNOSIS — D63.1 ANEMIA IN STAGE 3B CHRONIC KIDNEY DISEASE: Primary | ICD-10-CM

## 2021-12-01 PROCEDURE — 96365 THER/PROPH/DIAG IV INF INIT: CPT

## 2021-12-01 PROCEDURE — 25000003 PHARM REV CODE 250: Performed by: INTERNAL MEDICINE

## 2021-12-01 PROCEDURE — A4216 STERILE WATER/SALINE, 10 ML: HCPCS | Performed by: INTERNAL MEDICINE

## 2021-12-01 PROCEDURE — 63600175 PHARM REV CODE 636 W HCPCS: Performed by: INTERNAL MEDICINE

## 2021-12-01 RX ORDER — IRBESARTAN 300 MG/1
300 TABLET ORAL NIGHTLY
Qty: 90 TABLET | Refills: 1 | Status: ON HOLD | OUTPATIENT
Start: 2021-12-01 | End: 2022-02-03 | Stop reason: HOSPADM

## 2021-12-01 RX ORDER — SODIUM CHLORIDE 0.9 % (FLUSH) 0.9 %
10 SYRINGE (ML) INJECTION
Status: DISCONTINUED | OUTPATIENT
Start: 2021-12-01 | End: 2021-12-01 | Stop reason: HOSPADM

## 2021-12-01 RX ORDER — HEPARIN 100 UNIT/ML
500 SYRINGE INTRAVENOUS
Status: DISCONTINUED | OUTPATIENT
Start: 2021-12-01 | End: 2021-12-01 | Stop reason: HOSPADM

## 2021-12-01 RX ADMIN — SODIUM CHLORIDE: 0.9 INJECTION, SOLUTION INTRAVENOUS at 08:12

## 2021-12-01 RX ADMIN — IRON SUCROSE 200 MG: 20 INJECTION, SOLUTION INTRAVENOUS at 08:12

## 2021-12-01 RX ADMIN — Medication 10 ML: at 09:12

## 2021-12-03 ENCOUNTER — HOSPITAL ENCOUNTER (OUTPATIENT)
Facility: HOSPITAL | Age: 61
Discharge: HOME | End: 2021-12-03
Attending: INTERNAL MEDICINE | Admitting: INTERNAL MEDICINE
Payer: MEDICAID

## 2021-12-03 VITALS
TEMPERATURE: 97 F | SYSTOLIC BLOOD PRESSURE: 125 MMHG | DIASTOLIC BLOOD PRESSURE: 58 MMHG | HEIGHT: 70 IN | OXYGEN SATURATION: 99 % | HEART RATE: 63 BPM | RESPIRATION RATE: 15 BRPM | WEIGHT: 180 LBS | BODY MASS INDEX: 25.77 KG/M2

## 2021-12-03 DIAGNOSIS — Z01.810 PRE-OPERATIVE CARDIOVASCULAR EXAMINATION: Primary | ICD-10-CM

## 2021-12-03 DIAGNOSIS — I70.213 ATHEROSCLEROSIS OF NATIVE ARTERY OF BOTH LOWER EXTREMITIES WITH INTERMITTENT CLAUDICATION: ICD-10-CM

## 2021-12-03 DIAGNOSIS — D64.9 ANEMIA, UNSPECIFIED TYPE: Chronic | ICD-10-CM

## 2021-12-03 LAB
ABO + RH BLD: NORMAL
ANION GAP SERPL CALC-SCNC: 9 MMOL/L (ref 8–16)
BASOPHILS # BLD AUTO: 0.03 K/UL (ref 0–0.2)
BASOPHILS # BLD AUTO: 0.04 K/UL (ref 0–0.2)
BASOPHILS NFR BLD: 0.6 % (ref 0–1.9)
BASOPHILS NFR BLD: 0.7 % (ref 0–1.9)
BLD GP AB SCN CELLS X3 SERPL QL: NORMAL
BLD PROD TYP BPU: NORMAL
BLOOD UNIT EXPIRATION DATE: NORMAL
BLOOD UNIT TYPE CODE: 600
BLOOD UNIT TYPE: NORMAL
BUN SERPL-MCNC: 48 MG/DL (ref 6–20)
CALCIUM SERPL-MCNC: 9 MG/DL (ref 8.7–10.5)
CHLORIDE SERPL-SCNC: 114 MMOL/L (ref 95–110)
CO2 SERPL-SCNC: 13 MMOL/L (ref 23–29)
CODING SYSTEM: NORMAL
CREAT SERPL-MCNC: 2.8 MG/DL (ref 0.5–1.4)
DIFFERENTIAL METHOD: ABNORMAL
DIFFERENTIAL METHOD: ABNORMAL
DISPENSE STATUS: NORMAL
EOSINOPHIL # BLD AUTO: 0.1 K/UL (ref 0–0.5)
EOSINOPHIL # BLD AUTO: 0.2 K/UL (ref 0–0.5)
EOSINOPHIL NFR BLD: 2.4 % (ref 0–8)
EOSINOPHIL NFR BLD: 3.3 % (ref 0–8)
ERYTHROCYTE [DISTWIDTH] IN BLOOD BY AUTOMATED COUNT: 13.9 % (ref 11.5–14.5)
ERYTHROCYTE [DISTWIDTH] IN BLOOD BY AUTOMATED COUNT: 13.9 % (ref 11.5–14.5)
EST. GFR  (AFRICAN AMERICAN): 27 ML/MIN/1.73 M^2
EST. GFR  (NON AFRICAN AMERICAN): 23 ML/MIN/1.73 M^2
GLUCOSE SERPL-MCNC: 97 MG/DL (ref 70–110)
HCT VFR BLD AUTO: 17.7 % (ref 40–54)
HCT VFR BLD AUTO: 17.9 % (ref 40–54)
HGB BLD-MCNC: 6 G/DL (ref 14–18)
HGB BLD-MCNC: 6 G/DL (ref 14–18)
IMM GRANULOCYTES # BLD AUTO: 0.01 K/UL (ref 0–0.04)
IMM GRANULOCYTES # BLD AUTO: 0.02 K/UL (ref 0–0.04)
IMM GRANULOCYTES NFR BLD AUTO: 0.2 % (ref 0–0.5)
IMM GRANULOCYTES NFR BLD AUTO: 0.4 % (ref 0–0.5)
LYMPHOCYTES # BLD AUTO: 1.1 K/UL (ref 1–4.8)
LYMPHOCYTES # BLD AUTO: 1.1 K/UL (ref 1–4.8)
LYMPHOCYTES NFR BLD: 19.9 % (ref 18–48)
LYMPHOCYTES NFR BLD: 20.1 % (ref 18–48)
MCH RBC QN AUTO: 29.3 PG (ref 27–31)
MCH RBC QN AUTO: 29.6 PG (ref 27–31)
MCHC RBC AUTO-ENTMCNC: 33.5 G/DL (ref 32–36)
MCHC RBC AUTO-ENTMCNC: 33.9 G/DL (ref 32–36)
MCV RBC AUTO: 87 FL (ref 82–98)
MCV RBC AUTO: 87 FL (ref 82–98)
MONOCYTES # BLD AUTO: 0.5 K/UL (ref 0.3–1)
MONOCYTES # BLD AUTO: 0.5 K/UL (ref 0.3–1)
MONOCYTES NFR BLD: 9.1 % (ref 4–15)
MONOCYTES NFR BLD: 9.2 % (ref 4–15)
NEUTROPHILS # BLD AUTO: 3.6 K/UL (ref 1.8–7.7)
NEUTROPHILS # BLD AUTO: 3.7 K/UL (ref 1.8–7.7)
NEUTROPHILS NFR BLD: 66.7 % (ref 38–73)
NEUTROPHILS NFR BLD: 67.4 % (ref 38–73)
NRBC BLD-RTO: 0 /100 WBC
NRBC BLD-RTO: 0 /100 WBC
PLATELET # BLD AUTO: 122 K/UL (ref 150–450)
PLATELET # BLD AUTO: 133 K/UL (ref 150–450)
PMV BLD AUTO: 10 FL (ref 9.2–12.9)
PMV BLD AUTO: 10.1 FL (ref 9.2–12.9)
POTASSIUM SERPL-SCNC: 5 MMOL/L (ref 3.5–5.1)
RBC # BLD AUTO: 2.03 M/UL (ref 4.6–6.2)
RBC # BLD AUTO: 2.05 M/UL (ref 4.6–6.2)
SODIUM SERPL-SCNC: 136 MMOL/L (ref 136–145)
TRANS ERYTHROCYTES VOL PATIENT: NORMAL ML
WBC # BLD AUTO: 5.41 K/UL (ref 3.9–12.7)
WBC # BLD AUTO: 5.42 K/UL (ref 3.9–12.7)

## 2021-12-03 PROCEDURE — 86900 BLOOD TYPING SEROLOGIC ABO: CPT | Performed by: INTERNAL MEDICINE

## 2021-12-03 PROCEDURE — 80048 BASIC METABOLIC PNL TOTAL CA: CPT | Performed by: INTERNAL MEDICINE

## 2021-12-03 PROCEDURE — P9021 RED BLOOD CELLS UNIT: HCPCS | Performed by: INTERNAL MEDICINE

## 2021-12-03 PROCEDURE — 86920 COMPATIBILITY TEST SPIN: CPT | Performed by: INTERNAL MEDICINE

## 2021-12-03 PROCEDURE — 85025 COMPLETE CBC W/AUTO DIFF WBC: CPT | Performed by: INTERNAL MEDICINE

## 2021-12-03 PROCEDURE — 36415 COLL VENOUS BLD VENIPUNCTURE: CPT | Performed by: INTERNAL MEDICINE

## 2021-12-03 PROCEDURE — 93005 ELECTROCARDIOGRAM TRACING: CPT

## 2021-12-03 PROCEDURE — 99220 PR INITIAL OBSERVATION CARE,LEVL III: CPT | Mod: ,,, | Performed by: INTERNAL MEDICINE

## 2021-12-03 PROCEDURE — 99220 PR INITIAL OBSERVATION CARE,LEVL III: ICD-10-PCS | Mod: ,,, | Performed by: INTERNAL MEDICINE

## 2021-12-03 PROCEDURE — 93010 ELECTROCARDIOGRAM REPORT: CPT | Mod: ,,, | Performed by: INTERNAL MEDICINE

## 2021-12-03 PROCEDURE — 93010 EKG 12-LEAD: ICD-10-PCS | Mod: ,,, | Performed by: INTERNAL MEDICINE

## 2021-12-03 RX ORDER — SODIUM CHLORIDE 9 MG/ML
INJECTION, SOLUTION INTRAVENOUS CONTINUOUS
Status: ACTIVE | OUTPATIENT
Start: 2021-12-03 | End: 2021-12-03

## 2021-12-03 RX ORDER — HYDROCODONE BITARTRATE AND ACETAMINOPHEN 500; 5 MG/1; MG/1
TABLET ORAL
Status: DISCONTINUED | OUTPATIENT
Start: 2021-12-03 | End: 2021-12-03 | Stop reason: HOSPADM

## 2021-12-05 ENCOUNTER — PATIENT MESSAGE (OUTPATIENT)
Dept: CARDIOLOGY | Facility: CLINIC | Age: 61
End: 2021-12-05
Payer: MEDICAID

## 2021-12-05 ENCOUNTER — PATIENT MESSAGE (OUTPATIENT)
Dept: INTERNAL MEDICINE | Facility: CLINIC | Age: 61
End: 2021-12-05
Payer: MEDICAID

## 2021-12-06 ENCOUNTER — TELEPHONE (OUTPATIENT)
Dept: ENDOSCOPY | Facility: HOSPITAL | Age: 61
End: 2021-12-06
Payer: MEDICAID

## 2021-12-08 ENCOUNTER — PATIENT MESSAGE (OUTPATIENT)
Dept: ENDOSCOPY | Facility: HOSPITAL | Age: 61
End: 2021-12-08
Payer: MEDICAID

## 2021-12-08 DIAGNOSIS — Z12.11 COLON CANCER SCREENING: Primary | ICD-10-CM

## 2021-12-08 RX ORDER — SODIUM, POTASSIUM,MAG SULFATES 17.5-3.13G
1 SOLUTION, RECONSTITUTED, ORAL ORAL DAILY
Qty: 1 KIT | Refills: 0 | Status: SHIPPED | OUTPATIENT
Start: 2021-12-08 | End: 2021-12-10

## 2021-12-09 DIAGNOSIS — D63.1 ANEMIA IN STAGE 3B CHRONIC KIDNEY DISEASE: Primary | ICD-10-CM

## 2021-12-09 DIAGNOSIS — N18.32 ANEMIA IN STAGE 3B CHRONIC KIDNEY DISEASE: Primary | ICD-10-CM

## 2021-12-13 ENCOUNTER — PATIENT MESSAGE (OUTPATIENT)
Dept: CARDIOLOGY | Facility: CLINIC | Age: 61
End: 2021-12-13
Payer: MEDICAID

## 2021-12-13 ENCOUNTER — PATIENT MESSAGE (OUTPATIENT)
Dept: ENDOSCOPY | Facility: HOSPITAL | Age: 61
End: 2021-12-13
Payer: MEDICAID

## 2021-12-14 ENCOUNTER — LAB VISIT (OUTPATIENT)
Dept: LAB | Facility: HOSPITAL | Age: 61
End: 2021-12-14
Attending: INTERNAL MEDICINE
Payer: MEDICAID

## 2021-12-14 DIAGNOSIS — D63.8 ANEMIA OF CHRONIC DISEASE: ICD-10-CM

## 2021-12-14 DIAGNOSIS — N18.4 CKD (CHRONIC KIDNEY DISEASE), STAGE IV: ICD-10-CM

## 2021-12-14 LAB
ALBUMIN SERPL BCP-MCNC: 3.7 G/DL (ref 3.5–5.2)
ALP SERPL-CCNC: 53 U/L (ref 55–135)
ALT SERPL W/O P-5'-P-CCNC: 12 U/L (ref 10–44)
ANION GAP SERPL CALC-SCNC: 4 MMOL/L (ref 8–16)
AST SERPL-CCNC: 14 U/L (ref 10–40)
BASOPHILS # BLD AUTO: 0.03 K/UL (ref 0–0.2)
BASOPHILS NFR BLD: 0.6 % (ref 0–1.9)
BILIRUB SERPL-MCNC: 0.4 MG/DL (ref 0.1–1)
BUN SERPL-MCNC: 48 MG/DL (ref 6–20)
CALCIUM SERPL-MCNC: 9.1 MG/DL (ref 8.7–10.5)
CHLORIDE SERPL-SCNC: 115 MMOL/L (ref 95–110)
CO2 SERPL-SCNC: 12 MMOL/L (ref 23–29)
CREAT SERPL-MCNC: 3 MG/DL (ref 0.5–1.4)
DIFFERENTIAL METHOD: ABNORMAL
EOSINOPHIL # BLD AUTO: 0.2 K/UL (ref 0–0.5)
EOSINOPHIL NFR BLD: 3.3 % (ref 0–8)
ERYTHROCYTE [DISTWIDTH] IN BLOOD BY AUTOMATED COUNT: 13.2 % (ref 11.5–14.5)
ERYTHROCYTE [SEDIMENTATION RATE] IN BLOOD BY WESTERGREN METHOD: 5 MM/HR (ref 0–23)
EST. GFR  (AFRICAN AMERICAN): 24.9 ML/MIN/1.73 M^2
EST. GFR  (NON AFRICAN AMERICAN): 21.6 ML/MIN/1.73 M^2
FERRITIN SERPL-MCNC: 418 NG/ML (ref 20–300)
GLUCOSE SERPL-MCNC: 105 MG/DL (ref 70–110)
HCT VFR BLD AUTO: 22.1 % (ref 40–54)
HGB BLD-MCNC: 7.4 G/DL (ref 14–18)
IMM GRANULOCYTES # BLD AUTO: 0.01 K/UL (ref 0–0.04)
IMM GRANULOCYTES NFR BLD AUTO: 0.2 % (ref 0–0.5)
IRON SERPL-MCNC: 128 UG/DL (ref 45–160)
LYMPHOCYTES # BLD AUTO: 1.2 K/UL (ref 1–4.8)
LYMPHOCYTES NFR BLD: 22.6 % (ref 18–48)
MCH RBC QN AUTO: 29.6 PG (ref 27–31)
MCHC RBC AUTO-ENTMCNC: 33.5 G/DL (ref 32–36)
MCV RBC AUTO: 88 FL (ref 82–98)
MONOCYTES # BLD AUTO: 0.5 K/UL (ref 0.3–1)
MONOCYTES NFR BLD: 9.6 % (ref 4–15)
NEUTROPHILS # BLD AUTO: 3.5 K/UL (ref 1.8–7.7)
NEUTROPHILS NFR BLD: 63.7 % (ref 38–73)
NRBC BLD-RTO: 0 /100 WBC
PLATELET # BLD AUTO: 150 K/UL (ref 150–450)
PMV BLD AUTO: 10.6 FL (ref 9.2–12.9)
POTASSIUM SERPL-SCNC: 5.1 MMOL/L (ref 3.5–5.1)
PROT SERPL-MCNC: 6.7 G/DL (ref 6–8.4)
RBC # BLD AUTO: 2.5 M/UL (ref 4.6–6.2)
SATURATED IRON: 47 % (ref 20–50)
SODIUM SERPL-SCNC: 131 MMOL/L (ref 136–145)
TOTAL IRON BINDING CAPACITY: 272 UG/DL (ref 250–450)
TRANSFERRIN SERPL-MCNC: 184 MG/DL (ref 200–375)
WBC # BLD AUTO: 5.41 K/UL (ref 3.9–12.7)

## 2021-12-14 PROCEDURE — 85025 COMPLETE CBC W/AUTO DIFF WBC: CPT | Performed by: INTERNAL MEDICINE

## 2021-12-14 PROCEDURE — 82668 ASSAY OF ERYTHROPOIETIN: CPT | Performed by: INTERNAL MEDICINE

## 2021-12-14 PROCEDURE — 80053 COMPREHEN METABOLIC PANEL: CPT | Performed by: INTERNAL MEDICINE

## 2021-12-14 PROCEDURE — 82728 ASSAY OF FERRITIN: CPT | Performed by: INTERNAL MEDICINE

## 2021-12-14 PROCEDURE — 85652 RBC SED RATE AUTOMATED: CPT | Performed by: INTERNAL MEDICINE

## 2021-12-14 PROCEDURE — 84466 ASSAY OF TRANSFERRIN: CPT | Performed by: INTERNAL MEDICINE

## 2021-12-15 ENCOUNTER — OFFICE VISIT (OUTPATIENT)
Dept: HEMATOLOGY/ONCOLOGY | Facility: CLINIC | Age: 61
End: 2021-12-15
Payer: MEDICAID

## 2021-12-15 VITALS
HEIGHT: 70 IN | SYSTOLIC BLOOD PRESSURE: 101 MMHG | DIASTOLIC BLOOD PRESSURE: 52 MMHG | BODY MASS INDEX: 25.44 KG/M2 | RESPIRATION RATE: 20 BRPM | TEMPERATURE: 97 F | WEIGHT: 177.69 LBS | HEART RATE: 66 BPM | OXYGEN SATURATION: 98 %

## 2021-12-15 DIAGNOSIS — N18.4 CKD (CHRONIC KIDNEY DISEASE) STAGE 4, GFR 15-29 ML/MIN: Primary | ICD-10-CM

## 2021-12-15 DIAGNOSIS — D63.1 ANEMIA DUE TO STAGE 4 CHRONIC KIDNEY DISEASE: ICD-10-CM

## 2021-12-15 DIAGNOSIS — I25.10 CORONARY ARTERY DISEASE INVOLVING NATIVE CORONARY ARTERY OF NATIVE HEART WITHOUT ANGINA PECTORIS: ICD-10-CM

## 2021-12-15 DIAGNOSIS — N18.4 ANEMIA DUE TO STAGE 4 CHRONIC KIDNEY DISEASE: ICD-10-CM

## 2021-12-15 PROCEDURE — 4010F PR ACE/ARB THEARPY RXD/TAKEN: ICD-10-PCS | Mod: CPTII,,, | Performed by: INTERNAL MEDICINE

## 2021-12-15 PROCEDURE — 3066F PR DOCUMENTATION OF TREATMENT FOR NEPHROPATHY: ICD-10-PCS | Mod: CPTII,,, | Performed by: INTERNAL MEDICINE

## 2021-12-15 PROCEDURE — 99213 OFFICE O/P EST LOW 20 MIN: CPT | Mod: PBBFAC,PO | Performed by: INTERNAL MEDICINE

## 2021-12-15 PROCEDURE — 3062F PR POS MACROALBUMINURIA RESULT DOCUMENTED/REVIEW: ICD-10-PCS | Mod: CPTII,,, | Performed by: INTERNAL MEDICINE

## 2021-12-15 PROCEDURE — 99999 PR PBB SHADOW E&M-EST. PATIENT-LVL III: ICD-10-PCS | Mod: PBBFAC,,, | Performed by: INTERNAL MEDICINE

## 2021-12-15 PROCEDURE — 99214 PR OFFICE/OUTPT VISIT, EST, LEVL IV, 30-39 MIN: ICD-10-PCS | Mod: S$PBB,,, | Performed by: INTERNAL MEDICINE

## 2021-12-15 PROCEDURE — 99214 OFFICE O/P EST MOD 30 MIN: CPT | Mod: S$PBB,,, | Performed by: INTERNAL MEDICINE

## 2021-12-15 PROCEDURE — 3066F NEPHROPATHY DOC TX: CPT | Mod: CPTII,,, | Performed by: INTERNAL MEDICINE

## 2021-12-15 PROCEDURE — 3062F POS MACROALBUMINURIA REV: CPT | Mod: CPTII,,, | Performed by: INTERNAL MEDICINE

## 2021-12-15 PROCEDURE — 4010F ACE/ARB THERAPY RXD/TAKEN: CPT | Mod: CPTII,,, | Performed by: INTERNAL MEDICINE

## 2021-12-15 PROCEDURE — 99999 PR PBB SHADOW E&M-EST. PATIENT-LVL III: CPT | Mod: PBBFAC,,, | Performed by: INTERNAL MEDICINE

## 2021-12-17 ENCOUNTER — PATIENT MESSAGE (OUTPATIENT)
Dept: INTERNAL MEDICINE | Facility: CLINIC | Age: 61
End: 2021-12-17
Payer: MEDICAID

## 2021-12-17 LAB — EPO SERPL-ACNC: 3.2 MIU/ML (ref 2.6–18.5)

## 2021-12-23 ENCOUNTER — PATIENT MESSAGE (OUTPATIENT)
Dept: INTERNAL MEDICINE | Facility: CLINIC | Age: 61
End: 2021-12-23
Payer: MEDICAID

## 2021-12-23 ENCOUNTER — INFUSION (OUTPATIENT)
Dept: INFUSION THERAPY | Facility: HOSPITAL | Age: 61
End: 2021-12-23
Attending: INTERNAL MEDICINE
Payer: MEDICAID

## 2021-12-23 VITALS — WEIGHT: 177.69 LBS | HEIGHT: 70 IN | BODY MASS INDEX: 25.44 KG/M2

## 2021-12-23 DIAGNOSIS — N18.4 ANEMIA DUE TO STAGE 4 CHRONIC KIDNEY DISEASE: Primary | ICD-10-CM

## 2021-12-23 DIAGNOSIS — D63.1 ANEMIA DUE TO STAGE 4 CHRONIC KIDNEY DISEASE: Primary | ICD-10-CM

## 2021-12-23 PROCEDURE — 63600175 PHARM REV CODE 636 W HCPCS: Mod: JG | Performed by: INTERNAL MEDICINE

## 2021-12-23 PROCEDURE — 96372 THER/PROPH/DIAG INJ SC/IM: CPT

## 2021-12-23 RX ADMIN — EPOETIN ALFA-EPBX 40000 UNITS: 40000 INJECTION, SOLUTION INTRAVENOUS; SUBCUTANEOUS at 09:12

## 2021-12-27 DIAGNOSIS — L98.9 SKIN LESION: Primary | ICD-10-CM

## 2021-12-29 ENCOUNTER — PATIENT MESSAGE (OUTPATIENT)
Dept: ENDOSCOPY | Facility: HOSPITAL | Age: 61
End: 2021-12-29
Payer: MEDICAID

## 2021-12-29 DIAGNOSIS — Z01.818 PRE-OP TESTING: Primary | ICD-10-CM

## 2021-12-31 ENCOUNTER — PATIENT MESSAGE (OUTPATIENT)
Dept: INTERNAL MEDICINE | Facility: CLINIC | Age: 61
End: 2021-12-31
Payer: MEDICAID

## 2022-01-03 ENCOUNTER — PATIENT MESSAGE (OUTPATIENT)
Dept: ENDOSCOPY | Facility: HOSPITAL | Age: 62
End: 2022-01-03
Payer: MEDICAID

## 2022-01-03 ENCOUNTER — PATIENT OUTREACH (OUTPATIENT)
Dept: ADMINISTRATIVE | Facility: OTHER | Age: 62
End: 2022-01-03
Payer: MEDICAID

## 2022-01-03 ENCOUNTER — LAB VISIT (OUTPATIENT)
Dept: PRIMARY CARE CLINIC | Facility: CLINIC | Age: 62
End: 2022-01-03
Payer: MEDICAID

## 2022-01-03 ENCOUNTER — PATIENT MESSAGE (OUTPATIENT)
Dept: ADMINISTRATIVE | Facility: OTHER | Age: 62
End: 2022-01-03
Payer: MEDICAID

## 2022-01-03 ENCOUNTER — OFFICE VISIT (OUTPATIENT)
Dept: CARDIOLOGY | Facility: CLINIC | Age: 62
End: 2022-01-03
Payer: MEDICAID

## 2022-01-03 VITALS
HEART RATE: 64 BPM | SYSTOLIC BLOOD PRESSURE: 95 MMHG | DIASTOLIC BLOOD PRESSURE: 55 MMHG | BODY MASS INDEX: 24.91 KG/M2 | WEIGHT: 174 LBS | HEIGHT: 70 IN

## 2022-01-03 DIAGNOSIS — Z01.818 PRE-OP TESTING: ICD-10-CM

## 2022-01-03 DIAGNOSIS — I87.2 VENOUS INSUFFICIENCY OF BOTH LOWER EXTREMITIES: ICD-10-CM

## 2022-01-03 DIAGNOSIS — I25.10 CORONARY ARTERY DISEASE INVOLVING NATIVE CORONARY ARTERY OF NATIVE HEART WITHOUT ANGINA PECTORIS: ICD-10-CM

## 2022-01-03 DIAGNOSIS — I10 PRIMARY HYPERTENSION: ICD-10-CM

## 2022-01-03 DIAGNOSIS — N18.32 STAGE 3B CHRONIC KIDNEY DISEASE: ICD-10-CM

## 2022-01-03 DIAGNOSIS — E78.2 MIXED HYPERLIPIDEMIA: Chronic | ICD-10-CM

## 2022-01-03 DIAGNOSIS — I70.213 ATHEROSCLEROSIS OF NATIVE ARTERY OF BOTH LOWER EXTREMITIES WITH INTERMITTENT CLAUDICATION: Primary | ICD-10-CM

## 2022-01-03 DIAGNOSIS — Z01.818 PRE-OP TESTING: Primary | ICD-10-CM

## 2022-01-03 PROCEDURE — 3078F DIAST BP <80 MM HG: CPT | Mod: CPTII,,, | Performed by: INTERNAL MEDICINE

## 2022-01-03 PROCEDURE — 99215 OFFICE O/P EST HI 40 MIN: CPT | Mod: S$PBB,,, | Performed by: INTERNAL MEDICINE

## 2022-01-03 PROCEDURE — 3074F PR MOST RECENT SYSTOLIC BLOOD PRESSURE < 130 MM HG: ICD-10-PCS | Mod: CPTII,,, | Performed by: INTERNAL MEDICINE

## 2022-01-03 PROCEDURE — 99215 PR OFFICE/OUTPT VISIT, EST, LEVL V, 40-54 MIN: ICD-10-PCS | Mod: S$PBB,,, | Performed by: INTERNAL MEDICINE

## 2022-01-03 PROCEDURE — 3008F PR BODY MASS INDEX (BMI) DOCUMENTED: ICD-10-PCS | Mod: CPTII,,, | Performed by: INTERNAL MEDICINE

## 2022-01-03 PROCEDURE — U0005 INFEC AGEN DETEC AMPLI PROBE: HCPCS | Performed by: FAMILY MEDICINE

## 2022-01-03 PROCEDURE — 3078F PR MOST RECENT DIASTOLIC BLOOD PRESSURE < 80 MM HG: ICD-10-PCS | Mod: CPTII,,, | Performed by: INTERNAL MEDICINE

## 2022-01-03 PROCEDURE — 99999 PR PBB SHADOW E&M-EST. PATIENT-LVL IV: CPT | Mod: PBBFAC,,, | Performed by: INTERNAL MEDICINE

## 2022-01-03 PROCEDURE — 3074F SYST BP LT 130 MM HG: CPT | Mod: CPTII,,, | Performed by: INTERNAL MEDICINE

## 2022-01-03 PROCEDURE — 99999 PR PBB SHADOW E&M-EST. PATIENT-LVL IV: ICD-10-PCS | Mod: PBBFAC,,, | Performed by: INTERNAL MEDICINE

## 2022-01-03 PROCEDURE — 99214 OFFICE O/P EST MOD 30 MIN: CPT | Mod: PBBFAC,PO | Performed by: INTERNAL MEDICINE

## 2022-01-03 PROCEDURE — 3008F BODY MASS INDEX DOCD: CPT | Mod: CPTII,,, | Performed by: INTERNAL MEDICINE

## 2022-01-03 PROCEDURE — U0003 INFECTIOUS AGENT DETECTION BY NUCLEIC ACID (DNA OR RNA); SEVERE ACUTE RESPIRATORY SYNDROME CORONAVIRUS 2 (SARS-COV-2) (CORONAVIRUS DISEASE [COVID-19]), AMPLIFIED PROBE TECHNIQUE, MAKING USE OF HIGH THROUGHPUT TECHNOLOGIES AS DESCRIBED BY CMS-2020-01-R: HCPCS | Performed by: FAMILY MEDICINE

## 2022-01-03 NOTE — PROGRESS NOTES
Subjective:   Patient ID:  Domingo Gross is a 61 y.o. male who presents for follow up of Peripheral Arterial Disease, Coronary Artery Disease, Hyperlipidemia, Hypertension, Fatigue, and Anemia      HPI:              Domingo Gross 61 y.o. male is here follow up CAD, HTN, HLP, PAF in NSR, edema, and PAD with winsome IIb claudication. He is s/p bilateral SFA, GRUPO, EIA, and R CFA revascularization. He has seen nephrology and later recommended a renal biopsy but he was reasonably reluctant to stop DAPT because of his history of cardiac arrest. His peripheral vascular intervention in 12/2021 was cancelled due to severe anemia with a H/H that required a transfusion with subsequent ongoing intervention by heme/onc. He has a GI work coming up for anemia.       He was admitted 10/4/2019 for cardiac arrest. He was fully rescued. Initial rhythm was afib with rvr. He was not taken immediately to the cath lab because of ARF + hypokalemia. He had a diagnostic angiogram which revealed patent LM, LAD, RCA stent, and new ostial LCX lesion 99% with R to L collaterals. He had PCI with REZA after his renal function returned to baseline.         8/19/2021 with CP HTN urgency with . Added nifedipine and BP has been stable. No CP          ELISABETH with stress 5/2017:   R 1.01 to 0.44   L 0.92 to 0.45    After 6 minutes ambulation      CTA 5/2017:       Patent distal aorta and bilateral GRUPO/EIA stents   L EIA with de shawna 90% stenosis   L SFA 80% with 3 vessel run off     R EIA/CFA with moderate disease   R SFA 80% stenosis with 3 vessel run off        Peripheral angiogram with intervention 6/2017         Patent bilateral GRUPO/EIA stents.    De shawna 80% left EIA stenosis treated with 9.0 x 80 mm Lifestar  stent post dilated with 8.0 mm balloon.    Bilateral 70-80% SFA stenosis with 3 vessel run off.        3/2018      L SFA intervention for claudication   75% L SFA with 3 vessel run off   7 mmHg resting and 33 mmHg hyperemic mean  gradient         L CFA antegrade access   PTA with 6.0 x 150 mm   PTA with 6.0 x 150 mm Lutonix   3 vessel run off           4/13/2018       S/p R SFA PTA for winsome IIb claudication   R CFA antegrade access         R CFA with 50% stenosis-heavily calcified lesion               Baseline /90         R SFA with 70% stenosis with a significant resting and hyperemic mean gradient     3 vessel run off             PTA of R SFA with 6.0 x 150 + 6.0 x 60 mm Lutonix DCB        5/2/2018   Patent arteries post revascularization        2/2019     R 0.84 to 0.27   L 0.90 to 0.66   After ambulation   Severe R calf claudication          Nuclear stress test 2/2019     + ECG with 2 mm ST depression   No wall motion abnormalities   Test stopped at 6 minutes, 7 METs, 86% predicted heart rate   Stopped because of R leg claudication + ECG changes          S/p PCI RCA and LAD with REZA 3/2019       RCA 3.5 x 22 mm Resolute REZA   LAD 2.5 x 30 and 2.5 x 25 mm Resolute REZA      Peripheral aortogram 5/10/2019     R CFA 95% stenosis   3 vessel run off        Seen by Dr. Giron for R CFA endarterectomy but preferable should be off plavix for at least 5 days. He has a risk for stent thrombosis with premature interruption of DAPT. He later had R CFA atherectomy + PTA with DCB.           10/11/2019 for cardiac arrest        S/p staged LCX PCI                    L CFA access              IVUS guided PCI              3.0 x 15 mm Resolute REZA post dilated with 3.5 NC balloon         PET stress 2/2020     No ischemia      Echo 2/2021      EF 55%   Normal diastolic fn   Normal RV fn   Mild MR/TR/UT         Arterial US 7/2021     Bilateral SFA disease > 70      Venous US 7/2021     No DVT   No superficial reflux   R POP vein reflux > 500 msec    Echo 8/2021      Normal EF   Normal RV fn   Mild-mod MR   Mod UT   PASP 26 mmHg        ELISABETH 11/2021      Resting ELISABETH right 0.84 and left 0.83   Exercise ELISABETH right 0.39 and left 0.32 after 3.5 minutes  ambulation   TBI right 0.58 and left 0.48          US 11/2021      Bilateral mid SFA high grade disease        Patient Active Problem List    Diagnosis Date Noted    Cardiac arrest 10/04/2019     Priority: High    Atherosclerosis of native artery of both lower extremities with intermittent claudication 03/02/2018     Priority: High    Atherosclerosis of native artery of right lower extremity with intermittent claudication 04/13/2018     Priority: Low    Anemia due to stage 4 chronic kidney disease 12/15/2021    Anemia in stage 3b chronic kidney disease 10/26/2021    Anemia 08/20/2021    CKD (chronic kidney disease), stage IV 08/20/2021    COPD (chronic obstructive pulmonary disease) 08/20/2021    Vitamin D deficiency 10/03/2020    Nuclear sclerotic cataract of left eye 07/02/2020    Nuclear sclerosis, bilateral 06/08/2020    Nephrotic range proteinuria 04/06/2020    Hypertensive kidney disease with stage 3 chronic kidney disease 04/06/2020    Atrial fibrillation with RVR 10/04/2019    Acute renal failure 10/04/2019    Bilateral carotid artery stenosis     Coronary artery disease involving native coronary artery of native heart without angina pectoris 03/29/2019         3/29/2019    S/p LHC via R radial           LM, LAD, and LCX are patent with luminal irregularities  RCA mid 95% calcified lesion  Normal EF with LVEDP 8 mmHg           PCI of mid LAD with 2.5 x 30 and 2.5 x 15 mm Resolute REZA  PCI of proximal RCA to treat guide dissection with 3.5 x 22 Resolute REZA        10/11/2019 for cardiac arrest        S/p staged LCX PCI                    L CFA access              IVUS guided PCI              3.0 x 15 mm Resolute REZA post dilated with 3.5 NC balloon           Abnormal cardiovascular stress test 02/27/2019         Nuclear stress test 2/2019     + ECG with 2 mm ST depression   No wall motion abnormalities   Test stopped at 6 minutes, 7 METs, 86% predicted heart rate   Stopped because of R leg  claudication + ECG changes            Venous insufficiency of both lower extremities 06/04/2018    Localized edema 06/04/2018    Left knee pain 01/19/2016    Pain of left lower extremity 01/19/2016    Acute pain of left knee 12/11/2015    Hyperlipidemia 06/12/2015    DJD (degenerative joint disease), lumbar 05/27/2015    Lumbar facet arthropathy 05/27/2015    DDD (degenerative disc disease) 05/27/2015    Spondylosis without myelopathy 05/27/2015    Limb pain 11/21/2014    History of back injury 11/21/2014     20 years ago a bag fell on his back at work      Myalgia 11/21/2014    Claudication in peripheral vascular disease 06/13/2014    PAD (peripheral artery disease) 05/21/2014 6/13/2014      1. Three vessel runoff below the knee bilaterally.  2. Severe disease of the terminal aorta.  3. Successful PTAS.  4. Bilateral common iliac reconstruction with 8 x 39 mm stent extending in the aorta  5. Right common illiac was treated with an overlapping 9 x 60 mm SES   6. Left common iliac was treated with an overlapping 8 x 80 mm SES down to external iliac  7. All stents were post dilated with 9.0 x 60 mm balloons  8. Moderate bilateral SFA disease  9. Chronically occluded left internal iliac artery        6/12/2015      1. 80% mid left SFA with a 20 mmHg resting mean gradient  2. Atherectomy with 2.2 [email or web address] Volcano catheter  3. PTA with 6.0 x 100 mm Lutonix drug coated balloon        6/9/2017      Patent bilateral GRUPO/EIA stents  L EIA PTAS 9.0 x 80 mm Life stent post dilated with 8.0 mm balloon  Bilateral 70-80% SFA stenosis with 3 vessel run off          3/2018      L SFA intervention for claudication   75% L SFA with 3 vessel run off   7 mmHg resting and 33 mmHg hyperemic mean gradient         L CFA antegrade access   PTA with 6.0 x 150 mm   PTA with 6.0 x 150 mm Lutonix   3 vessel run off           4/13/2018       S/p R SFA PTA for winsome IIb claudication   R CFA antegrade access         R CFA  with 50% stenosis-heavily calcified lesion               Baseline /90         R SFA with 70% stenosis with a significant resting and hyperemic mean gradient     3 vessel run off             PTA of R SFA with 6.0 x 150 + 6.0 x 60 mm Lutonix DCB        2018   Patent arteries post revascularization        2019     R 0.84 to 0.27   L 0.90 to 0.66   After ambulation   Severe R calf claudication        Peripheral angiogram 5/10/2019                   95% R CFA stenosis; heavily calcified    Patent SFA, POP + 3 vessel run off      Peripheral intervention 2019    S/p right CFA revascularization for winsome IIb claudication       Assisted JOSY approach   L radial access for visualization   5-6 slender R PT access for delivery of therapy          Atherectomy with SilverHawk catheter   PTA with 7.0 x 40 mm Lutonix DCB            Atherosclerosis of leg with intermittent claudication 2014     Winsome IIB      Elevated TSH 2013    HTN (hypertension) 2013    Elevated fasting blood sugar 2013           Right Arm BP - Sittin/55  Left Arm BP - Sittin/55          LAST HbA1c  Lab Results   Component Value Date    HGBA1C 5.7 (H) 10/28/2019       Lipid panel  Lab Results   Component Value Date    CHOL 105 (L) 2021    CHOL 162 03/15/2021    CHOL 211 (H) 2019     Lab Results   Component Value Date    HDL 44 2021    HDL 43 03/15/2021    HDL 53 2019     Lab Results   Component Value Date    LDLCALC 51.0 (L) 2021    LDLCALC 98.0 03/15/2021    LDLCALC 123.8 2019     Lab Results   Component Value Date    TRIG 50 2021    TRIG 105 03/15/2021    TRIG 171 (H) 2019     Lab Results   Component Value Date    CHOLHDL 41.9 2021    CHOLHDL 26.5 03/15/2021    CHOLHDL 25.1 2019            Review of Systems   Constitutional: Negative for diaphoresis, night sweats, weight gain and weight loss.   HENT: Negative for congestion.    Eyes: Negative  for blurred vision, discharge and double vision.   Cardiovascular: Negative for chest pain, claudication, cyanosis, dyspnea on exertion, irregular heartbeat, leg swelling, near-syncope, orthopnea, palpitations, paroxysmal nocturnal dyspnea and syncope.   Respiratory: Negative for cough, shortness of breath and wheezing.    Endocrine: Negative for cold intolerance, heat intolerance and polyphagia.   Hematologic/Lymphatic: Negative for adenopathy and bleeding problem. Does not bruise/bleed easily.   Skin: Negative for dry skin and nail changes.   Musculoskeletal: Negative for arthritis, back pain, falls, joint pain, myalgias and neck pain.   Gastrointestinal: Negative for bloating, abdominal pain, change in bowel habit and constipation.   Genitourinary: Negative for bladder incontinence, dysuria, flank pain, genital sores and missed menses.   Neurological: Negative for aphonia, brief paralysis, difficulty with concentration, dizziness and weakness.   Psychiatric/Behavioral: Negative for altered mental status and memory loss. The patient does not have insomnia.    Allergic/Immunologic: Negative for environmental allergies.       Objective:   Physical Exam  Constitutional:       Appearance: He is well-developed.      Interventions: He is not intubated.  HENT:      Head: Normocephalic and atraumatic.      Right Ear: External ear normal.      Left Ear: External ear normal.   Eyes:      General: No scleral icterus.        Right eye: No discharge.         Left eye: No discharge.      Conjunctiva/sclera: Conjunctivae normal.      Pupils: Pupils are equal, round, and reactive to light.   Neck:      Thyroid: No thyromegaly.      Vascular: Normal carotid pulses. No carotid bruit, hepatojugular reflux or JVD.      Trachea: No tracheal deviation.   Cardiovascular:      Rate and Rhythm: Normal rate and regular rhythm.  No extrasystoles are present.     Chest Wall: PMI is not displaced.      Pulses: No midsystolic click.            Carotid pulses are 2+ on the right side and 2+ on the left side.       Radial pulses are 2+ on the right side and 2+ on the left side.        Femoral pulses are 2+ on the right side and 2+ on the left side.       Popliteal pulses are 2+ on the right side and 2+ on the left side.        Dorsalis pedis pulses are 1+ on the right side and 2+ on the left side.        Posterior tibial pulses are 1+ on the right side and 2+ on the left side.      Heart sounds: S1 normal and S2 normal. Heart sounds not distant. No murmur heard.  No friction rub. No gallop. No S3 sounds.       Comments:       Biphasic R DP and weak but biphasic R PT doppler signals       Biphasic L DP and PT doppler signals          Pulmonary:      Effort: Pulmonary effort is normal. No tachypnea, bradypnea, accessory muscle usage or respiratory distress. He is not intubated.      Breath sounds: Normal breath sounds. No stridor. No decreased breath sounds, wheezing or rales.   Chest:      Chest wall: No tenderness.   Abdominal:      General: There is no distension or abdominal bruit.      Palpations: There is no mass or pulsatile mass.      Tenderness: There is no abdominal tenderness. There is no guarding or rebound.   Musculoskeletal:         General: No tenderness. Normal range of motion.      Cervical back: Normal range of motion and neck supple.   Lymphadenopathy:      Cervical: No cervical adenopathy.   Skin:     General: Skin is warm.      Coloration: Skin is not pale.      Findings: No erythema or rash.   Neurological:      Mental Status: He is alert and oriented to person, place, and time.      Cranial Nerves: No cranial nerve deficit.      Coordination: Coordination normal.      Deep Tendon Reflexes: Reflexes are normal and symmetric.   Psychiatric:         Behavior: Behavior normal.         Thought Content: Thought content normal.         Judgment: Judgment normal.     Physical findings from previous visit       Assessment:     1. Atherosclerosis  of native artery of both lower extremities with intermittent claudication    2. Mixed hyperlipidemia    3. Primary hypertension    4. Venous insufficiency of both lower extremities    5. Coronary artery disease involving native coronary artery of native heart without angina pectoris    6. Stage 3b chronic kidney disease        Plan:       Hold cardura because of symptomatic anemia + hypotension   Hold peripheral procedure until his anemia is stabilized       Medical therapy for CAD  DAPT with aspirin + plavix  Intense statin therapy  Arb  Pletal       Target LDL < 70  Low salt diet  Weight loss        Continue with current medical plan and lifestyle changes.  Return sooner for concerns or questions. If symptoms persist go to the ED  I have reviewed all pertinent data on this patient   Referral for cataract disease        Follow up as scheduled. Return sooner for concerns or questions  He expressed verbal understanding and agreed with the plan        Greater than 50% of the visit of 45 minutes was spent counseling, educating, and coordinating the care of the patient.        Greater than 50% of the visit of 45 minutes was spent counseling, educating, and coordinating the care of the patient.      -In today's visit, at least 4 established conditions that pose a risk to life or bodily function have been addressed and the conditions are severe.    -In today's visit, monitoring for drug toxicity was accomplished.      I have reviewed the patient's medical history in detail and updated the computerized patient record.    Orders Placed This Encounter   Procedures    CBC Auto Differential     Standing Status:   Future     Standing Expiration Date:   3/24/2023       Follow up as scheduled. Return sooner for concerns or questions        Patient's Medications   New Prescriptions    No medications on file   Previous Medications    ALBUTEROL (PROVENTIL/VENTOLIN HFA) 90 MCG/ACTUATION INHALER    INHALE 2 PUFFS BY MOUTH EVERY 6  HOURS AS NEEDED FOR WHEEZING OR RESCUE    ASPIRIN (ASPIR-81 ORAL)    Take 1 tablet by mouth.    ATORVASTATIN (LIPITOR) 80 MG TABLET    Take 80 mg by mouth once daily.    CARVEDILOL (COREG) 25 MG TABLET    Take 2 tablets (50 mg total) by mouth 2 (two) times daily with meals.    CHOLECALCIFEROL, VITAMIN D3, 1,250 MCG (50,000 UNIT) CAPSULE    Take 1 capsule (50,000 Units total) by mouth once a week.    CLOPIDOGREL (PLAVIX) 75 MG TABLET    Take 1 tablet (75 mg total) by mouth once daily.    COENZYME Q10 (COQ-10) 100 MG CAPSULE    Take 1 capsule (100 mg total) by mouth once daily.    EPLERENONE (INSPRA) 50 MG TAB    Take 1 tablet (50 mg total) by mouth once daily.    FLUNISOLIDE 25 MCG, 0.025%, (NASALIDE) 25 MCG (0.025 %) SPRY    2 sprays by Nasal route 2 (two) times daily.    GABAPENTIN (NEURONTIN) 300 MG CAPSULE    Take 1 capsule (300 mg total) by mouth 3 (three) times daily as needed (back pain).    HYDRALAZINE (APRESOLINE) 100 MG TABLET    Take 1 tablet (100 mg total) by mouth 2 (two) times a day.    IRBESARTAN (AVAPRO) 300 MG TABLET    Take 1 tablet (300 mg total) by mouth every evening.    NIFEDIPINE (PROCARDIA-XL) 60 MG (OSM) 24 HR TABLET    Take 1 tablet (60 mg total) by mouth once daily.    ROSUVASTATIN (CRESTOR) 40 MG TAB    Take 1 tablet (40 mg total) by mouth every evening.   Modified Medications    Modified Medication Previous Medication    DOXAZOSIN (CARDURA) 4 MG TABLET doxazosin (CARDURA) 4 MG tablet       TAKE 1 TABLET(4 MG) BY MOUTH EVERY EVENING    Take 1 tablet (4 mg total) by mouth every evening.   Discontinued Medications    No medications on file

## 2022-01-03 NOTE — PROGRESS NOTES
Health Maintenance Due   Topic Date Due    HIV Screening  Never done    TETANUS VACCINE  Never done    Shingles Vaccine (1 of 2) Never done    Pneumococcal Vaccines (Age 0-64) (1 of 2 - PPSV23) 03/27/2020    PROSTATE-SPECIFIC ANTIGEN  10/28/2020    Influenza Vaccine (1) 09/01/2021    COVID-19 Vaccine (3 - Booster for Moderna series) 12/14/2021     Updates were requested from care everywhere.  Chart was reviewed for overdue Proactive [facility] Encounters ([facility]) topics (CRS, Breast Cancer Screening, Eye exam)  Health Maintenance has been updated.  LIN[facility] immunization registry triggered.  Immunizations were reconciled.

## 2022-01-04 ENCOUNTER — TELEPHONE (OUTPATIENT)
Dept: CARDIOLOGY | Facility: CLINIC | Age: 62
End: 2022-01-04
Payer: MEDICAID

## 2022-01-04 NOTE — TELEPHONE ENCOUNTER
Patient call and wanted instructions and how to take medication for colonoscopy. I advised to call the colonoscopy department to get instructions. Patient voices understanding.

## 2022-01-04 NOTE — TELEPHONE ENCOUNTER
Pt was seen in clinic with Dr Jenkins on 1/3/2022.  He reported that he has attempted to fax paperwork to Dr Encarnacion's office has been unsuccessful.    Notified that we will sned Dr Encarnacion's staff a message and ask to reach back to pt to further discuss.

## 2022-01-05 ENCOUNTER — ANESTHESIA EVENT (OUTPATIENT)
Dept: ENDOSCOPY | Facility: HOSPITAL | Age: 62
End: 2022-01-05
Payer: MEDICAID

## 2022-01-05 LAB — SARS-COV-2 RNA RESP QL NAA+PROBE: NOT DETECTED

## 2022-01-05 NOTE — ANESTHESIA PREPROCEDURE EVALUATION
01/05/2022  Pre-operative evaluation for Procedure(s) (LRB):  EGD (ESOPHAGOGASTRODUODENOSCOPY) (N/A)  COLONOSCOPY (N/A)    Domingo Gross is a 61 y.o. male w/ PMHx of  CKD, CAD, HTN, HLP, PAF in NSR, edema, and PAD with winsome IIb claudication. He is s/p bilateral SFA, GRUPO, EIA, and R CFA revascularization.     S/p PCI RCA and LAD with REZA 3/2019, s/p staged LCX PCI 10/2019  Patient Active Problem List   Diagnosis    HTN (hypertension)    Elevated fasting blood sugar    Elevated TSH    Atherosclerosis of leg with intermittent claudication    PAD (peripheral artery disease)    Claudication in peripheral vascular disease    Limb pain    History of back injury    Myalgia    DJD (degenerative joint disease), lumbar    Lumbar facet arthropathy    DDD (degenerative disc disease)    Spondylosis without myelopathy    Hyperlipidemia    Acute pain of left knee    Left knee pain    Pain of left lower extremity    Atherosclerosis of native artery of both lower extremities with intermittent claudication    Atherosclerosis of native artery of right lower extremity with intermittent claudication    Venous insufficiency of both lower extremities    Localized edema    Abnormal cardiovascular stress test    Coronary artery disease involving native coronary artery of native heart without angina pectoris    Bilateral carotid artery stenosis    Cardiac arrest    Atrial fibrillation with RVR    Syncope    Acute renal failure    CKD (chronic kidney disease), stage III    Nephrotic range proteinuria    Hypertensive kidney disease with stage 3 chronic kidney disease    Nuclear sclerosis, bilateral    Post-operative state    Nuclear sclerotic cataract of left eye    Vitamin D deficiency    Symptomatic anemia    Anemia    CKD (chronic kidney disease), stage IV    COPD (chronic obstructive  pulmonary disease)    Anemia in stage 3b chronic kidney disease    Anemia due to stage 4 chronic kidney disease       Review of patient's allergies indicates:   Allergen Reactions    Ace inhibitors Rash       No current facility-administered medications on file prior to encounter.     Current Outpatient Medications on File Prior to Encounter   Medication Sig Dispense Refill    albuterol (PROVENTIL/VENTOLIN HFA) 90 mcg/actuation inhaler INHALE 2 PUFFS BY MOUTH EVERY 6 HOURS AS NEEDED FOR WHEEZING OR RESCUE 18 g 0    ASPIRIN (ASPIR-81 ORAL) Take 1 tablet by mouth.      atorvastatin (LIPITOR) 80 MG tablet Take 80 mg by mouth once daily.      cholecalciferol, vitamin D3, 1,250 mcg (50,000 unit) capsule Take 1 capsule (50,000 Units total) by mouth once a week. 12 capsule 3    clopidogreL (PLAVIX) 75 mg tablet Take 1 tablet (75 mg total) by mouth once daily. 30 tablet 11    coenzyme Q10 (COQ-10) 100 mg capsule Take 1 capsule (100 mg total) by mouth once daily. 60 capsule 11    eplerenone (INSPRA) 50 MG Tab Take 1 tablet (50 mg total) by mouth once daily. 90 tablet 3    flunisolide 25 mcg, 0.025%, (NASALIDE) 25 mcg (0.025 %) Spry 2 sprays by Nasal route 2 (two) times daily. 1 Bottle 0    gabapentin (NEURONTIN) 300 MG capsule Take 1 capsule (300 mg total) by mouth 3 (three) times daily as needed (back pain). 90 capsule 11    rosuvastatin (CRESTOR) 40 MG Tab Take 1 tablet (40 mg total) by mouth every evening. 90 tablet 3       Past Surgical History:   Procedure Laterality Date    ABDOMINAL AORTOGRAPHY N/A 5/10/2019    Procedure: AORTOGRAM-ABDOMINAL;  Surgeon: Keo Jenkins MD;  Location: [facility] CATH LAB/EP;  Service: Cardiology;  Laterality: N/A;  RSFA intervention     AORTOGRAPHY WITH SERIALOGRAPHY N/A 12/3/2021    Procedure: AORTOGRAM, WITH SERIALOGRAPHY;  Surgeon: Keo Jenkins MD;  Location: [facility] CATH LAB/EP;  Service: Cardiology;  Laterality: N/A;    BONE MARROW BIOPSY Right 10/12/2021    Procedure:  BIOPSY-BONE MARROW;  Surgeon: Naseem Woods MD;  Location: [facility] OR;  Service: Oncology;  Laterality: Right;    CARDIAC CATHETERIZATION      CATARACT EXTRACTION      CATARACT EXTRACTION W/  INTRAOCULAR LENS IMPLANT Right 2020    Procedure: EXTRACTION, CATARACT, WITH IOL INSERTION;  Surgeon: Tin Light MD;  Location: [facility];  Service: Ophthalmology;  Laterality: Right;    CATARACT EXTRACTION W/  INTRAOCULAR LENS IMPLANT Left 2020    Procedure: EXTRACTION, CATARACT, WITH IOL INSERTION;  Surgeon: Tin Light MD;  Location: [facility] OR;  Service: Ophthalmology;  Laterality: Left;    CORONARY ANGIOGRAPHY N/A 3/29/2019    Procedure: ANGIOGRAM, CORONARY ARTERY;  Surgeon: Keo Jenkins MD;  Location: [facility] CATH LAB/EP;  Service: Cardiology;  Laterality: N/A;    CORONARY ANGIOGRAPHY Left 10/11/2019    Procedure: ANGIOGRAM, CORONARY ARTERY;  Surgeon: Keo Jenkins MD;  Location: [facility] CATH LAB/EP;  Service: Cardiology;  Laterality: Left;    CORONARY ANGIOPLASTY WITH STENT PLACEMENT  2019    mid and distal RCA    EYE SURGERY      LEFT HEART CATHETERIZATION N/A 3/29/2019    Procedure: Left heart cath;  Surgeon: Keo Jenkins MD;  Location: [facility] CATH LAB/EP;  Service: Cardiology;  Laterality: N/A;    LEFT HEART CATHETERIZATION Left 10/8/2019    Procedure: Left heart cath;  Surgeon: Keo Jenkins MD;  Location: [facility] CATH LAB/EP;  Service: Cardiology;  Laterality: Left;    PERCUTANEOUS TRANSLUMINAL ANGIOPLASTY (PTA) OF PERIPHERAL VESSEL N/A 2019    Procedure: PTA, PERIPHERAL VESSEL;  Surgeon: Keo Jenkins MD;  Location: [facility] CATH LAB/EP;  Service: Cardiology;  Laterality: N/A;       Social History     Socioeconomic History    Marital status:    Occupational History     Employer: [employer]   Tobacco Use    Smoking status: Former Smoker     Types: Cigarettes     Quit date: 1999     Years since quittin.7    Smokeless tobacco: Never Used   Substance and Sexual  Activity    Alcohol use: No     Alcohol/week: 0.0 standard drinks    Drug use: No    Sexual activity: Yes     Partners: Female     Social Determinants of Health     Financial Resource Strain: Medium Risk    Difficulty of Paying Living Expenses: Somewhat hard   Food Insecurity: Food Insecurity Present    Worried About Running Out of Food in the Last Year: Sometimes true    Ran Out of Food in the Last Year: Sometimes true   Transportation Needs: No Transportation Needs    Lack of Transportation (Medical): No    Lack of Transportation (Non-Medical): No   Physical Activity: Inactive    Days of Exercise per Week: 0 days    Minutes of Exercise per Session: 10 min   Stress: No Stress Concern Present    Feeling of Stress : Not at all   Social Connections: Unknown    Frequency of Communication with Friends and Family: Three times a week    Frequency of Social Gatherings with Friends and Family: Never    Active Member of Clubs or Organizations: No    Attends Club or Organization Meetings: Never    Marital Status:    Housing Stability: Unknown    Unable to Pay for Housing in the Last Year: No    Unstable Housing in the Last Year: No         CBC: No results for input(s): WBC, RBC, HGB, HCT, PLT, MCV, MCH, MCHC in the last 72 hours.    CMP: No results for input(s): NA, K, CL, CO2, BUN, CREATININE, GLU, MG, PHOS, CALCIUM, ALBUMIN, PROT, ALKPHOS, ALT, AST, BILITOT in the last 72 hours.    INR  No results for input(s): PT, INR, PROTIME, APTT in the last 72 hours.          2D Echo:  · Normal systolic function.  · The estimated ejection fraction is 55%.  · Indeterminate left ventricular diastolic function.  · Normal right ventricular size with normal right ventricular systolic function.  · Mild-to-moderate mitral regurgitation.  · Moderate pulmonic regurgitation.  · Normal central venous pressure (3 mmHg).  · The estimated PA systolic pressure is 26 mmHg.            Anesthesia Evaluation    I have reviewed  the Patient Summary Reports.    I have reviewed the Nursing Notes. I have reviewed the NPO Status.   I have reviewed the Medications.     Review of Systems  Cardiovascular:   Hypertension Past MI CAD      Pulmonary:   COPD    Renal/:   Chronic Renal Disease        Physical Exam  General:  Well nourished    Airway/Jaw/Neck:  Airway Findings: Mouth Opening: Normal Tongue: Normal  General Airway Assessment: Adult  Mallampati: II  TM Distance: Normal, at least 6 cm  Jaw/Neck Findings:  Neck ROM: Normal ROM       Chest/Lungs:  Chest/Lungs Findings: Clear to auscultation, Normal Respiratory Rate     Heart/Vascular:  Heart Findings: Rate: Normal  Rhythm: Regular Rhythm  Sounds: Normal        Mental Status:  Mental Status Findings:  Cooperative, Alert and Oriented         Anesthesia Plan  Type of Anesthesia, risks & benefits discussed:  Anesthesia Type:  general    Patient's Preference:   Plan Factors:          Intra-op Monitoring Plan: standard ASA monitors  Intra-op Monitoring Plan Comments:   Post Op Pain Control Plan: multimodal analgesia  Post Op Pain Control Plan Comments:     Induction:   IV  Beta Blocker:         Informed Consent: Patient understands risks and agrees with Anesthesia plan.  Questions answered. Anesthesia consent signed with patient.  ASA Score: 3     Day of Surgery Review of History & Physical:  There are no significant changes.          Ready For Surgery From Anesthesia Perspective.

## 2022-01-06 ENCOUNTER — PATIENT MESSAGE (OUTPATIENT)
Dept: INTERNAL MEDICINE | Facility: CLINIC | Age: 62
End: 2022-01-06
Payer: MEDICAID

## 2022-01-06 ENCOUNTER — HOSPITAL ENCOUNTER (OUTPATIENT)
Facility: HOSPITAL | Age: 62
Discharge: HOME | End: 2022-01-06
Attending: INTERNAL MEDICINE | Admitting: INTERNAL MEDICINE
Payer: MEDICAID

## 2022-01-06 ENCOUNTER — PATIENT MESSAGE (OUTPATIENT)
Dept: CARDIOLOGY | Facility: CLINIC | Age: 62
End: 2022-01-06
Payer: MEDICAID

## 2022-01-06 ENCOUNTER — ANESTHESIA (OUTPATIENT)
Dept: ENDOSCOPY | Facility: HOSPITAL | Age: 62
End: 2022-01-06
Payer: MEDICAID

## 2022-01-06 VITALS
SYSTOLIC BLOOD PRESSURE: 129 MMHG | DIASTOLIC BLOOD PRESSURE: 60 MMHG | BODY MASS INDEX: 24.34 KG/M2 | HEIGHT: 70 IN | HEART RATE: 61 BPM | OXYGEN SATURATION: 100 % | WEIGHT: 170 LBS | RESPIRATION RATE: 24 BRPM | TEMPERATURE: 98 F

## 2022-01-06 DIAGNOSIS — D64.9 ANEMIA, UNSPECIFIED TYPE: Primary | Chronic | ICD-10-CM

## 2022-01-06 DIAGNOSIS — D64.9 ANEMIA, UNSPECIFIED: ICD-10-CM

## 2022-01-06 PROCEDURE — D9220A PRA ANESTHESIA: Mod: ANES,,, | Performed by: STUDENT IN AN ORGANIZED HEALTH CARE EDUCATION/TRAINING PROGRAM

## 2022-01-06 PROCEDURE — D9220A PRA ANESTHESIA: Mod: CRNA,,, | Performed by: NURSE ANESTHETIST, CERTIFIED REGISTERED

## 2022-01-06 PROCEDURE — 88305 TISSUE EXAM BY PATHOLOGIST: CPT | Mod: 26,,, | Performed by: PATHOLOGY

## 2022-01-06 PROCEDURE — 43239 EGD BIOPSY SINGLE/MULTIPLE: CPT | Mod: 51,,, | Performed by: INTERNAL MEDICINE

## 2022-01-06 PROCEDURE — 45380 COLONOSCOPY AND BIOPSY: CPT | Performed by: INTERNAL MEDICINE

## 2022-01-06 PROCEDURE — 88305 TISSUE EXAM BY PATHOLOGIST: ICD-10-PCS | Mod: 26,,, | Performed by: PATHOLOGY

## 2022-01-06 PROCEDURE — 27201089 HC SNARE, DISP (ANY): Performed by: INTERNAL MEDICINE

## 2022-01-06 PROCEDURE — 88342 IMHCHEM/IMCYTCHM 1ST ANTB: CPT | Performed by: PATHOLOGY

## 2022-01-06 PROCEDURE — 88342 IMHCHEM/IMCYTCHM 1ST ANTB: CPT | Mod: 26,,, | Performed by: PATHOLOGY

## 2022-01-06 PROCEDURE — 27201012 HC FORCEPS, HOT/COLD, DISP: Performed by: INTERNAL MEDICINE

## 2022-01-06 PROCEDURE — 37000008 HC ANESTHESIA 1ST 15 MINUTES: Performed by: INTERNAL MEDICINE

## 2022-01-06 PROCEDURE — 88305 TISSUE EXAM BY PATHOLOGIST: CPT | Performed by: PATHOLOGY

## 2022-01-06 PROCEDURE — D9220A PRA ANESTHESIA: ICD-10-PCS | Mod: CRNA,,, | Performed by: NURSE ANESTHETIST, CERTIFIED REGISTERED

## 2022-01-06 PROCEDURE — 63600175 PHARM REV CODE 636 W HCPCS: Performed by: NURSE ANESTHETIST, CERTIFIED REGISTERED

## 2022-01-06 PROCEDURE — 43239 PR EGD, FLEX, W/BIOPSY, SGL/MULTI: ICD-10-PCS | Mod: 51,,, | Performed by: INTERNAL MEDICINE

## 2022-01-06 PROCEDURE — 27200997: Performed by: INTERNAL MEDICINE

## 2022-01-06 PROCEDURE — 45380 COLONOSCOPY AND BIOPSY: CPT | Mod: 59,,, | Performed by: INTERNAL MEDICINE

## 2022-01-06 PROCEDURE — 45380 PR COLONOSCOPY,BIOPSY: ICD-10-PCS | Mod: 59,,, | Performed by: INTERNAL MEDICINE

## 2022-01-06 PROCEDURE — 45385 COLONOSCOPY W/LESION REMOVAL: CPT | Performed by: INTERNAL MEDICINE

## 2022-01-06 PROCEDURE — 00813 ANES UPR LWR GI NDSC PX: CPT | Performed by: INTERNAL MEDICINE

## 2022-01-06 PROCEDURE — 45385 COLONOSCOPY W/LESION REMOVAL: CPT | Mod: ,,, | Performed by: INTERNAL MEDICINE

## 2022-01-06 PROCEDURE — 88342 CHG IMMUNOCYTOCHEMISTRY: ICD-10-PCS | Mod: 26,,, | Performed by: PATHOLOGY

## 2022-01-06 PROCEDURE — 43239 EGD BIOPSY SINGLE/MULTIPLE: CPT | Performed by: INTERNAL MEDICINE

## 2022-01-06 PROCEDURE — 37000009 HC ANESTHESIA EA ADD 15 MINS: Performed by: INTERNAL MEDICINE

## 2022-01-06 PROCEDURE — 45385 PR COLONOSCOPY,REMV LESN,SNARE: ICD-10-PCS | Mod: ,,, | Performed by: INTERNAL MEDICINE

## 2022-01-06 PROCEDURE — 25000003 PHARM REV CODE 250: Performed by: INTERNAL MEDICINE

## 2022-01-06 PROCEDURE — D9220A PRA ANESTHESIA: ICD-10-PCS | Mod: ANES,,, | Performed by: STUDENT IN AN ORGANIZED HEALTH CARE EDUCATION/TRAINING PROGRAM

## 2022-01-06 RX ORDER — LIDOCAINE HCL/PF 100 MG/5ML
SYRINGE (ML) INTRAVENOUS
Status: DISCONTINUED | OUTPATIENT
Start: 2022-01-06 | End: 2022-01-06

## 2022-01-06 RX ORDER — SODIUM CHLORIDE 9 MG/ML
INJECTION, SOLUTION INTRAVENOUS CONTINUOUS
Status: DISCONTINUED | OUTPATIENT
Start: 2022-01-06 | End: 2022-01-06 | Stop reason: HOSPADM

## 2022-01-06 RX ORDER — FENTANYL CITRATE 50 UG/ML
25 INJECTION, SOLUTION INTRAMUSCULAR; INTRAVENOUS EVERY 5 MIN PRN
Status: DISCONTINUED | OUTPATIENT
Start: 2022-01-06 | End: 2022-01-06 | Stop reason: HOSPADM

## 2022-01-06 RX ORDER — PROPOFOL 10 MG/ML
INJECTION, EMULSION INTRAVENOUS CONTINUOUS PRN
Status: DISCONTINUED | OUTPATIENT
Start: 2022-01-06 | End: 2022-01-06

## 2022-01-06 RX ORDER — MIDAZOLAM HYDROCHLORIDE 1 MG/ML
INJECTION INTRAMUSCULAR; INTRAVENOUS
Status: DISCONTINUED | OUTPATIENT
Start: 2022-01-06 | End: 2022-01-06

## 2022-01-06 RX ORDER — HYDROMORPHONE HYDROCHLORIDE 1 MG/ML
0.2 INJECTION, SOLUTION INTRAMUSCULAR; INTRAVENOUS; SUBCUTANEOUS EVERY 5 MIN PRN
Status: DISCONTINUED | OUTPATIENT
Start: 2022-01-06 | End: 2022-01-06 | Stop reason: HOSPADM

## 2022-01-06 RX ORDER — PHENYLEPHRINE HYDROCHLORIDE 10 MG/ML
INJECTION INTRAVENOUS
Status: DISCONTINUED | OUTPATIENT
Start: 2022-01-06 | End: 2022-01-06

## 2022-01-06 RX ORDER — ONDANSETRON 2 MG/ML
4 INJECTION INTRAMUSCULAR; INTRAVENOUS DAILY PRN
Status: DISCONTINUED | OUTPATIENT
Start: 2022-01-06 | End: 2022-01-06 | Stop reason: HOSPADM

## 2022-01-06 RX ORDER — FENTANYL CITRATE 50 UG/ML
INJECTION, SOLUTION INTRAMUSCULAR; INTRAVENOUS
Status: DISCONTINUED | OUTPATIENT
Start: 2022-01-06 | End: 2022-01-06

## 2022-01-06 RX ORDER — OXYCODONE HYDROCHLORIDE 5 MG/1
5 TABLET ORAL
Status: DISCONTINUED | OUTPATIENT
Start: 2022-01-06 | End: 2022-01-06 | Stop reason: HOSPADM

## 2022-01-06 RX ORDER — PROPOFOL 10 MG/ML
INJECTION, EMULSION INTRAVENOUS
Status: DISCONTINUED | OUTPATIENT
Start: 2022-01-06 | End: 2022-01-06

## 2022-01-06 RX ADMIN — MIDAZOLAM HYDROCHLORIDE 1 MG: 1 INJECTION, SOLUTION INTRAMUSCULAR; INTRAVENOUS at 07:01

## 2022-01-06 RX ADMIN — PHENYLEPHRINE HYDROCHLORIDE 150 MCG: 10 INJECTION INTRAVENOUS at 08:01

## 2022-01-06 RX ADMIN — FENTANYL CITRATE 50 MCG: 50 INJECTION, SOLUTION INTRAMUSCULAR; INTRAVENOUS at 08:01

## 2022-01-06 RX ADMIN — PROPOFOL 125 MCG/KG/MIN: 10 INJECTION, EMULSION INTRAVENOUS at 08:01

## 2022-01-06 RX ADMIN — PROPOFOL 10 MG: 10 INJECTION, EMULSION INTRAVENOUS at 08:01

## 2022-01-06 RX ADMIN — PHENYLEPHRINE HYDROCHLORIDE 50 MCG: 10 INJECTION INTRAVENOUS at 08:01

## 2022-01-06 RX ADMIN — SODIUM CHLORIDE: 0.9 INJECTION, SOLUTION INTRAVENOUS at 07:01

## 2022-01-06 RX ADMIN — PROPOFOL 10 MG: 10 INJECTION, EMULSION INTRAVENOUS at 09:01

## 2022-01-06 RX ADMIN — Medication 100 MG: at 08:01

## 2022-01-06 RX ADMIN — PROPOFOL 40 MG: 10 INJECTION, EMULSION INTRAVENOUS at 08:01

## 2022-01-06 RX ADMIN — PHENYLEPHRINE HYDROCHLORIDE 100 MCG: 10 INJECTION INTRAVENOUS at 08:01

## 2022-01-06 RX ADMIN — PHENYLEPHRINE HYDROCHLORIDE 200 MCG: 10 INJECTION INTRAVENOUS at 08:01

## 2022-01-06 RX ADMIN — PROPOFOL 20 MG: 10 INJECTION, EMULSION INTRAVENOUS at 09:01

## 2022-01-06 NOTE — PLAN OF CARE
Discharge instructions given, patient and family member, verbalized understanding. Consents verified. Vitals back to baseline

## 2022-01-06 NOTE — PROVATION PATIENT INSTRUCTIONS
Discharge Summary/Instructions after an Endoscopic Procedure  Patient Name: Domingo Gross  Patient MRN: [id number]  Patient YOB: 1960 Thursday, January 6, 2022  Kathe Penaloza MD  Dear patient,  As a result of recent federal legislation (The Federal Cures Act), you may   receive lab or pathology results from your procedure in your [facility]   account before your physician is able to contact you. Your physician or   their representative will relay the results to you with their   recommendations at their soonest availability.  Thank you,  RESTRICTIONS:  During your procedure today, you received medications for sedation.  These   medications may affect your judgment, balance and coordination.  Therefore,   for 24 hours, you have the following restrictions:   - DO NOT drive a car, operate machinery, make legal/financial decisions,   sign important papers or drink alcohol.    ACTIVITY:  Today: no heavy lifting, straining or running due to procedural   sedation/anesthesia.  The following day: return to full activity including work.  DIET:  Eat and drink normally unless instructed otherwise.     TREATMENT FOR COMMON SIDE EFFECTS:  - Mild abdominal pain, nausea, belching, bloating or excessive gas:  rest,   eat lightly and use a heating pad.  - Sore Throat: treat with throat lozenges and/or gargle with warm salt   water.  - Because air was used during the procedure, expelling large amounts of air   from your rectum or belching is normal.  - If a bowel prep was taken, you may not have a bowel movement for 1-3 days.    This is normal.  SYMPTOMS TO WATCH FOR AND REPORT TO YOUR PHYSICIAN:  1. Abdominal pain or bloating, other than gas cramps.  2. Chest pain.  3. Back pain.  4. Signs of infection such as: chills or fever occurring within 24 hours   after the procedure.  5. Rectal bleeding, which would show as bright red, maroon, or black stools.   (A tablespoon of blood from the rectum is not serious, especially if    hemorrhoids are present.)  6. Vomiting.  7. Weakness or dizziness.  GO DIRECTLY TO THE NEAREST EMERGENCY ROOM IF YOU HAVE ANY OF THE FOLLOWING:      Difficulty breathing              Chills and/or fever over 101 F   Persistent vomiting and/or vomiting blood   Severe abdominal pain   Severe chest pain   Black, tarry stools   Bleeding- more than one tablespoon   Any other symptom or condition that you feel may need urgent attention  Your doctor recommends these additional instructions:  If any biopsies were taken, your doctors clinic will contact you in 1 to 2   weeks with any results.  - Discharge patient to home.   - Resume previous diet.   - Use a proton pump inhibitor PO BID for 8 weeks, then daily indefinitely.   - Await pathology results.   - Perform a colonoscopy today.   - The findings and recommendations were discussed with the patient.   - Patient has a contact number available for emergencies.  The signs and   symptoms of potential delayed complications were discussed with the   patient.  Return to normal activities tomorrow.  Written discharge   instructions were provided to the patient.  For questions, problems or results please call your physician - Kathe Penaloza MD at Work:  [phone].  [facility], EMERGENCY ROOM PHONE NUMBER: [phone]  IF A COMPLICATION OR EMERGENCY SITUATION ARISES AND YOU ARE UNABLE TO REACH   YOUR PHYSICIAN - GO DIRECTLY TO THE EMERGENCY ROOM.  Kathe Penaloza MD  1/6/2022 9:18:42 AM  This report has been verified and signed electronically.  Dear patient,  As a result of recent federal legislation (The Federal Cures Act), you may   receive lab or pathology results from your procedure in your [facility]   account before your physician is able to contact you. Your physician or   their representative will relay the results to you with their   recommendations at their soonest availability.  Thank you,  PROVATION

## 2022-01-06 NOTE — PROVATION PATIENT INSTRUCTIONS
Discharge Summary/Instructions after an Endoscopic Procedure  Patient Name: Domingo Gross  Patient MRN: [id number]  Patient YOB: 1960 Thursday, January 6, 2022  Kathe Penaloza MD  Dear patient,  As a result of recent federal legislation (The Federal Cures Act), you may   receive lab or pathology results from your procedure in your [facility]   account before your physician is able to contact you. Your physician or   their representative will relay the results to you with their   recommendations at their soonest availability.  Thank you,  RESTRICTIONS:  During your procedure today, you received medications for sedation.  These   medications may affect your judgment, balance and coordination.  Therefore,   for 24 hours, you have the following restrictions:   - DO NOT drive a car, operate machinery, make legal/financial decisions,   sign important papers or drink alcohol.    ACTIVITY:  Today: no heavy lifting, straining or running due to procedural   sedation/anesthesia.  The following day: return to full activity including work.  DIET:  Eat and drink normally unless instructed otherwise.     TREATMENT FOR COMMON SIDE EFFECTS:  - Mild abdominal pain, nausea, belching, bloating or excessive gas:  rest,   eat lightly and use a heating pad.  - Sore Throat: treat with throat lozenges and/or gargle with warm salt   water.  - Because air was used during the procedure, expelling large amounts of air   from your rectum or belching is normal.  - If a bowel prep was taken, you may not have a bowel movement for 1-3 days.    This is normal.  SYMPTOMS TO WATCH FOR AND REPORT TO YOUR PHYSICIAN:  1. Abdominal pain or bloating, other than gas cramps.  2. Chest pain.  3. Back pain.  4. Signs of infection such as: chills or fever occurring within 24 hours   after the procedure.  5. Rectal bleeding, which would show as bright red, maroon, or black stools.   (A tablespoon of blood from the rectum is not serious, especially if    hemorrhoids are present.)  6. Vomiting.  7. Weakness or dizziness.  GO DIRECTLY TO THE NEAREST EMERGENCY ROOM IF YOU HAVE ANY OF THE FOLLOWING:      Difficulty breathing              Chills and/or fever over 101 F   Persistent vomiting and/or vomiting blood   Severe abdominal pain   Severe chest pain   Black, tarry stools   Bleeding- more than one tablespoon   Any other symptom or condition that you feel may need urgent attention  Your doctor recommends these additional instructions:  If any biopsies were taken, your doctors clinic will contact you in 1 to 2   weeks with any results.  - Discharge patient to home.   - Resume previous diet.   - Resume Plavix (clopidogrel) at prior dose tomorrow.   - Await pathology results.   - Repeat colonoscopy in 3 years for surveillance of multiple polyps.   - The findings and recommendations were discussed with the patient.   - Patient has a contact number available for emergencies.  The signs and   symptoms of potential delayed complications were discussed with the   patient.  Return to normal activities tomorrow.  Written discharge   instructions were provided to the patient.  For questions, problems or results please call your physician - Kathe Penaloza MD at Work:  [phone].  [facility], EMERGENCY ROOM PHONE NUMBER: [phone]  IF A COMPLICATION OR EMERGENCY SITUATION ARISES AND YOU ARE UNABLE TO REACH   YOUR PHYSICIAN - GO DIRECTLY TO THE EMERGENCY ROOM.  Kathe Penaloza MD  1/6/2022 9:32:15 AM  This report has been verified and signed electronically.  Dear patient,  As a result of recent federal legislation (The Federal Cures Act), you may   receive lab or pathology results from your procedure in your [facility]   account before your physician is able to contact you. Your physician or   their representative will relay the results to you with their   recommendations at their soonest availability.  Thank you,  PROVATION

## 2022-01-06 NOTE — H&P
Short Stay Endoscopy History and Physical    PCP - Analy Encarnacion MD    Procedure - EGD/Colonoscopy  ASA - per anesthesia  Mallampati - per anesthesia  History of Anesthesia problems - no  Family history Anesthesia problems -  no   Plan of anesthesia - MAC    HPI:  This is a 61 y.o. male here for evaluation of :   Anemia, normocytic. No overt GI bleeding. Not iron deficient. No prior endoscopies. No family history of colon cancer.  Last dose Plavix a week ago.    ROS:  Constitutional: No fevers, chills, No weight loss  CV: No chest pain  Pulm: No cough, No shortness of breath  Ophtho: No vision changes  GI: see HPI  Derm: No rash    Medical History:  has a past medical history of Allergy, Anemia, Anticoagulant long-term use, Arthritis, CKD (chronic kidney disease) stage 4, GFR 15-29 ml/min, COPD (chronic obstructive pulmonary disease) (8/20/2021), Coronary artery disease, Heart attack (10/04/2019), Hematuria, Hemothorax, Hyperlipidemia, Hypertension, Hyperuricemia, Hypocalcemia, Hypokalemia, Hypophosphatemia, PAD (peripheral artery disease), Proteinuria, and Vitamin D deficiency.    Surgical History:  has a past surgical history that includes Eye surgery; Cardiac catheterization; Left heart catheterization (N/A, 3/29/2019); Coronary angiography (N/A, 3/29/2019); Abdominal aortography (N/A, 5/10/2019); Percutaneous transluminal angioplasty (PTA) of peripheral vessel (N/A, 7/12/2019); Coronary angioplasty with stent (03/29/2019); Left heart catheterization (Left, 10/8/2019); Coronary angiography (Left, 10/11/2019); Cataract extraction; Cataract extraction w/  intraocular lens implant (Right, 6/8/2020); Cataract extraction w/  intraocular lens implant (Left, 7/2/2020); Bone marrow biopsy (Right, 10/12/2021); and Aortography with serialography (N/A, 12/3/2021).    Family History: family history includes Aneurysm in his mother; Cancer in his father; Diabetes in his sister; Heart disease in his mother; Hypertension in  his mother and sister; No Known Problems in his brother and son.. Otherwise no colon cancer, inflammatory bowel disease, or GI malignancies.    Social History:  reports that he quit smoking about 22 years ago. His smoking use included cigarettes. He has never used smokeless tobacco. He reports that he does not drink alcohol and does not use drugs.    Review of patient's allergies indicates:   Allergen Reactions    Ace inhibitors Rash       Medications:   Medications Prior to Admission   Medication Sig Dispense Refill Last Dose    albuterol (PROVENTIL/VENTOLIN HFA) 90 mcg/actuation inhaler INHALE 2 PUFFS BY MOUTH EVERY 6 HOURS AS NEEDED FOR WHEEZING OR RESCUE 18 g 0 1/5/2022 at Unknown time    ASPIRIN (ASPIR-81 ORAL) Take 1 tablet by mouth.   1/5/2022 at Unknown time    atorvastatin (LIPITOR) 80 MG tablet Take 80 mg by mouth once daily.   1/5/2022 at Unknown time    carvediloL (COREG) 25 MG tablet Take 2 tablets (50 mg total) by mouth 2 (two) times daily with meals. 360 tablet 1 1/5/2022 at Unknown time    cholecalciferol, vitamin D3, 1,250 mcg (50,000 unit) capsule Take 1 capsule (50,000 Units total) by mouth once a week. 12 capsule 3 1/5/2022 at Unknown time    doxazosin (CARDURA) 4 MG tablet Take 1 tablet (4 mg total) by mouth every evening. 90 tablet 1 1/5/2022 at Unknown time    eplerenone (INSPRA) 50 MG Tab Take 1 tablet (50 mg total) by mouth once daily. 90 tablet 3 1/5/2022 at Unknown time    flunisolide 25 mcg, 0.025%, (NASALIDE) 25 mcg (0.025 %) Spry 2 sprays by Nasal route 2 (two) times daily. 1 Bottle 0 Past Month at Unknown time    gabapentin (NEURONTIN) 300 MG capsule Take 1 capsule (300 mg total) by mouth 3 (three) times daily as needed (back pain). 90 capsule 11 1/5/2022 at Unknown time    hydrALAZINE (APRESOLINE) 100 MG tablet Take 1 tablet (100 mg total) by mouth 2 (two) times a day. 180 tablet 1 1/5/2022 at Unknown time    irbesartan (AVAPRO) 300 MG tablet Take 1 tablet (300 mg total)  by mouth every evening. 90 tablet 1 1/5/2022 at Unknown time    NIFEdipine (PROCARDIA-XL) 60 MG (OSM) 24 hr tablet Take 1 tablet (60 mg total) by mouth once daily. 90 tablet 1 1/5/2022 at Unknown time    rosuvastatin (CRESTOR) 40 MG Tab Take 1 tablet (40 mg total) by mouth every evening. 90 tablet 3 1/5/2022 at Unknown time    clopidogreL (PLAVIX) 75 mg tablet Take 1 tablet (75 mg total) by mouth once daily. 30 tablet 11 12/30/2021    coenzyme Q10 (COQ-10) 100 mg capsule Take 1 capsule (100 mg total) by mouth once daily. 60 capsule 11        Physical Exam:    Vital Signs: There were no vitals filed for this visit.    General Appearance: Well appearing in no acute distress  Eyes:    No scleral icterus  ENT: Neck supple, Lips, mucosa, and tongue normal; teeth and gums normal  Lungs: CTA anteriorly  Heart:  Regular rate, S1, S2 normal, no murmurs heard.  Abdomen: Soft, non tender, non distended with normal bowel sounds. No hepatosplenomegaly, ascites, or mass.  Extremities: No edema  Skin: No rash    Labs:  Lab Results   Component Value Date    WBC 5.41 12/14/2021    HGB 7.4 (L) 12/14/2021    HCT 22.1 (L) 12/14/2021     12/14/2021    CHOL 105 (L) 08/20/2021    TRIG 50 08/20/2021    HDL 44 08/20/2021    ALT 12 12/14/2021    AST 14 12/14/2021     (L) 12/14/2021    K 5.1 12/14/2021     (H) 12/14/2021    CREATININE 3.0 (H) 12/14/2021    BUN 48 (H) 12/14/2021    CO2 12 (L) 12/14/2021    TSH 8.742 (H) 08/20/2021    PSA 1.3 10/28/2019    INR 1.2 07/15/2021    GLUF 102 11/25/2019    HGBA1C 5.7 (H) 10/28/2019       Plan:  EGD for anemia  Colonoscopy for anemia, positive FIT    I have explained the risks and benefits of endoscopy procedures to the patient including but not limited to bleeding, perforation, infection, and death.  The patient was asked if they understand and allowed to ask any further questions to their satisfaction.    Sabas Go MD

## 2022-01-06 NOTE — ANESTHESIA POSTPROCEDURE EVALUATION
Anesthesia Post Evaluation    Patient: Domingo Gross    Procedure(s) Performed: Procedure(s) (LRB):  EGD (ESOPHAGOGASTRODUODENOSCOPY) (N/A)  COLONOSCOPY (N/A)    Final Anesthesia Type: general      Patient location during evaluation: PACU  Patient participation: Yes- Able to Participate  Level of consciousness: awake and alert  Post-procedure vital signs: reviewed and stable  Pain management: adequate  Airway patency: patent  VERO mitigation strategies: Multimodal analgesia  PONV status at discharge: No PONV  Anesthetic complications: no      Cardiovascular status: blood pressure returned to baseline and hemodynamically stable  Respiratory status: unassisted  Hydration status: euvolemic  Follow-up not needed.          Vitals Value Taken Time   /60 01/06/22 1000   Temp 36.7 °C (98 °F) 01/06/22 1000   Pulse 61 01/06/22 1000   Resp 24 01/06/22 1000   SpO2 100 % 01/06/22 1000         No case tracking events are documented in the log.      Pain/Edmundo Score: Edmundo Score: 10 (1/6/2022  9:27 AM)

## 2022-01-06 NOTE — TRANSFER OF CARE
"Anesthesia Transfer of Care Note    Patient: Domingo Gross    Procedure(s) Performed: Procedure(s) (LRB):  EGD (ESOPHAGOGASTRODUODENOSCOPY) (N/A)  COLONOSCOPY (N/A)    Patient location: PACU    Anesthesia Type: general    Transport from OR: Transported from OR on room air with adequate spontaneous ventilation    Post pain: adequate analgesia    Post assessment: no apparent anesthetic complications and tolerated procedure well    Post vital signs: stable    Level of consciousness: awake, alert and oriented    Nausea/Vomiting: no nausea/vomiting    Complications: none    Transfer of care protocol was followed      Last vitals:   Visit Vitals  /77   Pulse 65   Temp 37.1 °C (98.8 °F)   Resp 18   Ht 5' 10" (1.778 m)   Wt 77.1 kg (170 lb)   SpO2 100%   BMI 24.39 kg/m²     "

## 2022-01-06 NOTE — DISCHARGE INSTRUCTIONS
Patient Education       Colonoscopy   Why is this procedure done?   Colonoscopy is done so your doctor can see the inside of your large intestines, also called your colon, and your rectum. It uses a lighted tube called a scope, which has a tiny camera that can be moved through the large intestine. This may be done to:  · Check for colon cancer or growths called polyps  · Look for the source of rectal bleeding  · Find the cause of changes in your bowel movements  · Find the cause of belly or rectal pain  · Check results from other tests  · Check your response to treatment for other diseases  · Learn about weight loss  What happens before the procedure?   · Your doctor will ask you about your health history and do an exam. The doctor may order tests for your stool. Talk to the doctor about  ? All the drugs you are taking. Be sure to include all prescription and over-the-counter (OTC) drugs, and herbal supplements. Tell the doctor about any drug allergy. Bring a list of drugs you take with you.  ? Any bleeding problems. Be sure to tell your doctor if you are taking any drugs that may cause bleeding. Some of these are warfarin, rivaroxaban, apixaban, ticagrelor, clopidogrel, ketorolac, ibuprofen, naproxen, or aspirin. Certain vitamins and herbs, such as garlic and fish oil, may also add to the risk for bleeding. You may need to stop these drugs as well. Talk to your doctor about them.  · The colon needs to be cleaned out before this test. Your doctor will tell you to take drugs that will cause watery loose stools. These may be liquids, pills, or both. You may need to take these the day before and the day of your test.  · You will be placed on a clear liquid diet the day before the exam and you will need to only have clear liquids until the test is done. Clear liquids include water, sports drinks, broth, soft drinks, and juices, but avoid anything that is red or purple in color. Do not drink alcohol.  · Your doctor may  ask you not to eat or drink any food other than the drugs or liquids that clean out your colon.  · You will not be allowed to drive after the procedure. Ask a family member or a friend to help you get home and stay with you if possible.  What happens during the procedure?   · The staff will put an IV in your arm to give you fluids and drugs. You may be given a drug to make you sleepy.  · You will lie on your side with your knees bent and pulled up toward your chest.  · The doctor will use a small thin tube, called a scope, with a light and a camera on it. The tube is put into your anus and moved through your rectum and into the large intestine or colon.  · Small amounts of air are put into your colon. The camera lets your doctor look at the lining of your colon.  · Your doctor may take small tissue samples and remove small growths.     · The tube is then taken out.  · The procedure may take 30 to 45 minutes.  What happens after the procedure?   · You will go to a recovery area and the staff will watch you closely.  · You will want to rest after your procedure.  · You may feel groggy.  · You should be able to eat your usual diet after the test.  · You will have gas and you may have mild cramping. This is normal.  · A small amount of bleeding may happen during the first few days after your procedure.  · If tissue was removed, it will be sent to a lab to be checked. Your doctor will tell you the results after a week or two.  What problems could happen?   · Tear inside your colon  · Bleeding can happen for a few days afterwards  Where can I learn more?   American Cancer Society  https://www.cancer.org/cancer/colon-rectal-cancer/iupcjtddp-acbwgftue-pptumbk/byecqwnkp-euyyo-qchv.html   American Society of Clinical Oncology  https://www.cancer.net/navigating-cancer-care/diagnosing-cancer/tests-and-procedures/colonoscopy   Last Reviewed Date   2021-03-10  Consumer Information Use and Disclaimer   This information is not  specific medical advice and does not replace information you receive from your health care provider. This is only a brief summary of general information. It does NOT include all information about conditions, illnesses, injuries, tests, procedures, treatments, therapies, discharge instructions or life-style choices that may apply to you. You must talk with your health care provider for complete information about your health and treatment options. This information should not be used to decide whether or not to accept your health care providers advice, instructions or recommendations. Only your health care provider has the knowledge and training to provide advice that is right for you.  Copyright   Copyright © 2021 UpToDate, Inc. and its affiliates and/or licensors. All rights reserved.  Patient Education       Upper GI Endoscopy   Why is this procedure done?   This procedure is done to view your upper gastrointestinal (GI) tract. This includes your throat and food pipe (esophagus). It also includes your stomach and the first part of the small bowel. Some people have this test for problems like coughing or throwing up blood. Other people may be having bad belly pain or blood in their stool. You may be having trouble swallowing or problems with acid reflux.  Doctors often use this test to look for problems like:  · Ulcers  · Cancer or tumor growths  · Internal bleeding  · Swelling  · Inflammation  · Infection  · Abdalla's esophagus  · Gastroesophageal reflux disease or GERD  · Swallowing problems     What will the results be?   Your doctor may find the problem inside your body that is causing your signs. The doctor can also treat some problems while doing this procedure. This may include things like stopping bleeding or removing a growth.  What happens before the procedure?   Your doctor will take your history and do an exam. Talk to the doctor about:  · All the drugs you are taking. Be sure to include all  prescription, over the counter, vitamins, and herbal supplements. Bring a list of drugs you take with you.  · Tell the doctor if you have any drug allergy.  · Any bleeding problems. Be sure to tell your doctor if you are taking any drugs that may cause bleeding. Some of these are warfarin, rivaroxaban, apixaban, ticagrelor, clopidogrel, ketorolac, ibuprofen, naproxen, or aspirin. Certain vitamins and herbs, such as garlic and fish oil, may also add to the risk for bleeding. You may need to stop these drugs as well. Talk to your doctor about them.  · When you need to stop eating or drinking before your procedure.  You will not be allowed to drive right away after the procedure. Ask a family member or a friend to drive you home.  What happens during the procedure?   · Once you are in the operating room, the staff will put an IV in your arm to give you fluids and drugs. You will be given a drug to make you sleepy. It will also help you stay pain free during the surgery.  · Your doctor may spray a drug in your throat to numb the area.  · You will be asked to lie on your left side. The staff may put a small tube in your nose to help you breathe. Your doctor may place a tool in your mouth to keep it open during the procedure. The staff may place a suction tool in your mouth to lessen saliva flow.  · The doctor will put a special scope in your mouth and down your food pipe. It is a long, thin tube with lights and a small camera. It sends images to a screen in the operating room where the camera is being used.  · To be able to view the site clearly, gas will be pumped into your belly.  · Your doctor will use the scope to see if there are problems in your upper GI tract. Small tools may be used with the scope to fix any problems that are found. Your doctor may stop an area of bleeding or take out a tumor. The doctor may also remove a growth or take tissue samples for biopsy.  · This procedure takes about 15 to 30  minutes.  What happens after the procedure?   · You will go to the Recovery Room and the staff will watch you closely.  · You will be allowed to go home when you are awake and able to eat and drink.  · You may feel bloated after the procedure. This is from any gas the doctor may have used to help see your GI tract better.  · You may have a sore throat after the procedure. You can drink fluid once the numbing drugs in your throat wear off.  · Ask your doctor when the results will be available. Set up a visit to talk about them.  What drugs may be needed?   The doctor may order drugs to:  · Help with pain  · Decrease the acid in your stomach  What problems could happen?   · Painful swallowing  · Upset stomach  · Injury to food pipe   · Throwing up  · Tear in the esophagus  Where can I learn more?   American College of Gastroenterology  https://gi.org/topics/upper-gi-endoscopy-egd/   Last Reviewed Date   2021-10-05  Consumer Information Use and Disclaimer   This information is not specific medical advice and does not replace information you receive from your health care provider. This is only a brief summary of general information. It does NOT include all information about conditions, illnesses, injuries, tests, procedures, treatments, therapies, discharge instructions or life-style choices that may apply to you. You must talk with your health care provider for complete information about your health and treatment options. This information should not be used to decide whether or not to accept your health care providers advice, instructions or recommendations. Only your health care provider has the knowledge and training to provide advice that is right for you.  Copyright   Copyright © 2021 UpToDate, [employer]. and its affiliates and/or licensors. All rights reserved.

## 2022-01-07 ENCOUNTER — INFUSION (OUTPATIENT)
Dept: INFUSION THERAPY | Facility: HOSPITAL | Age: 62
End: 2022-01-07
Attending: INTERNAL MEDICINE
Payer: MEDICAID

## 2022-01-07 VITALS — WEIGHT: 170 LBS | BODY MASS INDEX: 24.34 KG/M2 | HEIGHT: 70 IN

## 2022-01-07 DIAGNOSIS — N18.4 ANEMIA DUE TO STAGE 4 CHRONIC KIDNEY DISEASE: Primary | ICD-10-CM

## 2022-01-07 DIAGNOSIS — D63.1 ANEMIA DUE TO STAGE 4 CHRONIC KIDNEY DISEASE: Primary | ICD-10-CM

## 2022-01-07 PROCEDURE — 96372 THER/PROPH/DIAG INJ SC/IM: CPT

## 2022-01-07 PROCEDURE — 63600175 PHARM REV CODE 636 W HCPCS: Mod: JG | Performed by: INTERNAL MEDICINE

## 2022-01-07 RX ADMIN — EPOETIN ALFA-EPBX 40000 UNITS: 40000 INJECTION, SOLUTION INTRAVENOUS; SUBCUTANEOUS at 08:01

## 2022-01-07 NOTE — NURSING
Introduced self to pt.  Updated pt on plan of care, tolerated injection well.  Pt scheduled for next appointment.

## 2022-01-10 ENCOUNTER — PATIENT MESSAGE (OUTPATIENT)
Dept: INTERNAL MEDICINE | Facility: CLINIC | Age: 62
End: 2022-01-10
Payer: MEDICAID

## 2022-01-12 ENCOUNTER — PATIENT MESSAGE (OUTPATIENT)
Dept: GASTROENTEROLOGY | Facility: CLINIC | Age: 62
End: 2022-01-12
Payer: MEDICAID

## 2022-01-12 LAB
COMMENT: NORMAL
FINAL PATHOLOGIC DIAGNOSIS: NORMAL
GROSS: NORMAL
Lab: NORMAL

## 2022-01-13 ENCOUNTER — PATIENT MESSAGE (OUTPATIENT)
Dept: CARDIOLOGY | Facility: CLINIC | Age: 62
End: 2022-01-13
Payer: MEDICAID

## 2022-01-14 ENCOUNTER — OFFICE VISIT (OUTPATIENT)
Dept: INTERNAL MEDICINE | Facility: CLINIC | Age: 62
End: 2022-01-14
Payer: MEDICAID

## 2022-01-14 VITALS
BODY MASS INDEX: 24.34 KG/M2 | HEART RATE: 68 BPM | SYSTOLIC BLOOD PRESSURE: 98 MMHG | HEIGHT: 70 IN | WEIGHT: 170 LBS | OXYGEN SATURATION: 98 % | DIASTOLIC BLOOD PRESSURE: 50 MMHG

## 2022-01-14 DIAGNOSIS — D64.9 SYMPTOMATIC ANEMIA: ICD-10-CM

## 2022-01-14 DIAGNOSIS — C44.90 SKIN CANCER: ICD-10-CM

## 2022-01-14 DIAGNOSIS — I25.10 CORONARY ARTERY DISEASE INVOLVING NATIVE CORONARY ARTERY OF NATIVE HEART WITHOUT ANGINA PECTORIS: ICD-10-CM

## 2022-01-14 DIAGNOSIS — J44.9 CHRONIC OBSTRUCTIVE PULMONARY DISEASE, UNSPECIFIED COPD TYPE: Chronic | ICD-10-CM

## 2022-01-14 DIAGNOSIS — N18.4 CKD (CHRONIC KIDNEY DISEASE), STAGE IV: Chronic | ICD-10-CM

## 2022-01-14 DIAGNOSIS — R53.81 DEBILITY: Primary | ICD-10-CM

## 2022-01-14 DIAGNOSIS — E78.2 MIXED HYPERLIPIDEMIA: Chronic | ICD-10-CM

## 2022-01-14 DIAGNOSIS — I73.9 PAD (PERIPHERAL ARTERY DISEASE): ICD-10-CM

## 2022-01-14 DIAGNOSIS — I10 PRIMARY HYPERTENSION: ICD-10-CM

## 2022-01-14 PROCEDURE — 3008F BODY MASS INDEX DOCD: CPT | Mod: CPTII,,, | Performed by: INTERNAL MEDICINE

## 2022-01-14 PROCEDURE — 1159F MED LIST DOCD IN RCRD: CPT | Mod: CPTII,,, | Performed by: INTERNAL MEDICINE

## 2022-01-14 PROCEDURE — 99999 PR PBB SHADOW E&M-EST. PATIENT-LVL IV: ICD-10-PCS | Mod: PBBFAC,,, | Performed by: INTERNAL MEDICINE

## 2022-01-14 PROCEDURE — 99213 PR OFFICE/OUTPT VISIT, EST, LEVL III, 20-29 MIN: ICD-10-PCS | Mod: S$PBB,,, | Performed by: INTERNAL MEDICINE

## 2022-01-14 PROCEDURE — 3008F PR BODY MASS INDEX (BMI) DOCUMENTED: ICD-10-PCS | Mod: CPTII,,, | Performed by: INTERNAL MEDICINE

## 2022-01-14 PROCEDURE — 3074F SYST BP LT 130 MM HG: CPT | Mod: CPTII,,, | Performed by: INTERNAL MEDICINE

## 2022-01-14 PROCEDURE — 3078F DIAST BP <80 MM HG: CPT | Mod: CPTII,,, | Performed by: INTERNAL MEDICINE

## 2022-01-14 PROCEDURE — 99214 OFFICE O/P EST MOD 30 MIN: CPT | Mod: PBBFAC,PO | Performed by: INTERNAL MEDICINE

## 2022-01-14 PROCEDURE — 99999 PR PBB SHADOW E&M-EST. PATIENT-LVL IV: CPT | Mod: PBBFAC,,, | Performed by: INTERNAL MEDICINE

## 2022-01-14 PROCEDURE — 99213 OFFICE O/P EST LOW 20 MIN: CPT | Mod: S$PBB,,, | Performed by: INTERNAL MEDICINE

## 2022-01-14 PROCEDURE — 3074F PR MOST RECENT SYSTOLIC BLOOD PRESSURE < 130 MM HG: ICD-10-PCS | Mod: CPTII,,, | Performed by: INTERNAL MEDICINE

## 2022-01-14 PROCEDURE — 3078F PR MOST RECENT DIASTOLIC BLOOD PRESSURE < 80 MM HG: ICD-10-PCS | Mod: CPTII,,, | Performed by: INTERNAL MEDICINE

## 2022-01-14 PROCEDURE — 1159F PR MEDICATION LIST DOCUMENTED IN MEDICAL RECORD: ICD-10-PCS | Mod: CPTII,,, | Performed by: INTERNAL MEDICINE

## 2022-01-14 NOTE — PROGRESS NOTES
Patient ID: Domingo Gross is a 61 y.o. male.    Chief Complaint: Follow-up (Follow up on meds)    MEJIA Chirinos is a 61 y.o. male with chronic anemia, CKD stage 4, CAD, PAD, HTN, HLD, COPD, history of cardiac arrest who presents for discussion of disability.   Patient is severely fatigued and short of breath at all times and this is worsened by any sort of physical activity. He can shower, brush his teeth, makes his meals on occasion, pay bills, and drive a car on occasion. He can walk for only 5-10 min before he has to stop due to fatigue and shortness of breath. Has to rest for 15-20 min and then can start again. Cannot mow his lawn anymore. Cannot walk up stairs anymore. Unsure if symptoms due to anemia or CAD. He has undergone EGD And colonoscopy for the anemia but this was unrevealing. Also underwent bone marrow biopsy for the anemia; also unrevealing. He is currently awaiting procedures for cardiac stents and leg stents (bilateral).    He currently has what I suspect is a skin cancer on the right side of his face. We have been trying to get him in with Dermatology but insurance has been the limiting factor.    Review of Systems   Constitutional: Positive for fatigue.   Respiratory: Positive for shortness of breath.    All other systems reviewed and are negative.        Objective:     Vitals:    01/14/22 0832   BP: (!) 98/50   Pulse: 68        Physical Exam  Vitals reviewed.   Constitutional:       General: He is not in acute distress.     Appearance: Normal appearance. He is well-developed. He is not ill-appearing, toxic-appearing or diaphoretic.   HENT:      Head: Normocephalic and atraumatic.      Right Ear: External ear normal.      Left Ear: External ear normal.      Nose: Nose normal.   Eyes:      Extraocular Movements: Extraocular movements intact.      Conjunctiva/sclera: Conjunctivae normal.   Cardiovascular:      Rate and Rhythm: Normal rate and regular rhythm.      Pulses: Normal pulses.      Heart  sounds: Normal heart sounds. No murmur heard.  No friction rub. No gallop.    Pulmonary:      Effort: Pulmonary effort is normal. No respiratory distress.      Breath sounds: Normal breath sounds. No stridor. No wheezing, rhonchi or rales.   Musculoskeletal:      Right lower leg: No edema.      Left lower leg: No edema.   Skin:     General: Skin is warm and dry.      Comments: Large ulcerated skin lesion on right side forehead, has the appearance of skin cancer   Neurological:      General: No focal deficit present.      Mental Status: He is alert and oriented to person, place, and time. Mental status is at baseline.   Psychiatric:         Mood and Affect: Mood normal.         Behavior: Behavior normal.         Thought Content: Thought content normal.         Judgment: Judgment normal.         Assessment:       1. Debility    2. Symptomatic anemia Chronic   3. Mixed hyperlipidemia Well controlled   4. Primary hypertension Well controlled   5. PAD (peripheral artery disease) Chronic   6. Coronary artery disease involving native coronary artery of native heart without angina pectoris Chronic   7. CKD (chronic kidney disease), stage IV Chronic   8. Chronic obstructive pulmonary disease, unspecified COPD type Chronic   9. Skin cancer Active       Plan:     Etiology of fatigue and shortness of breath: could be due to anemia from CKD 4 (no other cause found at this time) and/or CAD.     Debility    Symptomatic anemia  Comments:  Etiology unclear. Being monitored by hematology/oncology. EGD, C-scope, Bone marrow--all unrevealing.     Mixed hyperlipidemia  Comments:  Cont current medication    Primary hypertension  Comments:  Cont current medication    PAD (peripheral artery disease)  Comments:  Cont current medication. Management per cardiology. Per patient, plans to put in leg stents    Coronary artery disease involving native coronary artery of native heart without angina pectoris  Comments:  Continue current medication.   Management per Cardiology.  Per patient, plans to put in cardiac stents in the future    CKD (chronic kidney disease), stage IV  Comments:  Continue to monitor     Chronic obstructive pulmonary disease, unspecified COPD type    Skin cancer  Comments:  Has Medicaid so cannot be seen at [facility].  working on appt with [facility].         RTC 3 months     Warning signs discussed, patient to call with any further issues or worsening of symptoms.       Parts of the above note were dictated using a voice dictation software. Please excuse any grammatical or typographical errors.

## 2022-01-14 NOTE — LETTER
January 14, 2022      [facility]  [address]  [place] [address]  Phone: [phone]  Fax: [phone]       Patient: Domingo Gross   YOB: 1960  Date of Visit: 01/14/2022    To Whom It May Concern:    Martha Gross  was at [facility] on 01/14/2022.   Taran is a 61 y.o. male with chronic anemia, CKD stage 4, CAD, PAD, HTN, HLD, COPD, history of cardiac arrest who presents for discussion of debility.  Patient is severely fatigued and short of breath at all times and this is worsened by any sort of physical activity. He can shower, brush his teeth, makes his meals on occasion, pay bills, and drive a car on occasion. He can walk for only 5-10 min before he has to stop due to fatigue and shortness of breath. Has to rest for 15-20 min and then can start again. Cannot mow his lawn anymore. Cannot walk up stairs anymore. Unsure if symptoms due to anemia or CAD. He has undergone EGD and colonoscopy for the anemia but this was unrevealing. Also underwent bone marrow biopsy for the anemia; also unrevealing. He is currently awaiting procedures for cardiac stents and leg stents (bilateral).      As a result of the above medical conditions and circumstances, I do not believe patient is able to work in any capacity.     Sincerely,      Analy Encarnacion MD

## 2022-01-19 ENCOUNTER — PATIENT MESSAGE (OUTPATIENT)
Dept: INTERNAL MEDICINE | Facility: CLINIC | Age: 62
End: 2022-01-19
Payer: MEDICAID

## 2022-01-19 ENCOUNTER — LAB VISIT (OUTPATIENT)
Dept: LAB | Facility: HOSPITAL | Age: 62
End: 2022-01-19
Attending: INTERNAL MEDICINE
Payer: MEDICAID

## 2022-01-19 DIAGNOSIS — N18.4 ANEMIA DUE TO STAGE 4 CHRONIC KIDNEY DISEASE: ICD-10-CM

## 2022-01-19 DIAGNOSIS — D63.1 ANEMIA DUE TO STAGE 4 CHRONIC KIDNEY DISEASE: ICD-10-CM

## 2022-01-19 LAB
ALBUMIN SERPL BCP-MCNC: 3.5 G/DL (ref 3.5–5.2)
ALP SERPL-CCNC: 55 U/L (ref 55–135)
ALT SERPL W/O P-5'-P-CCNC: 10 U/L (ref 10–44)
ANION GAP SERPL CALC-SCNC: 8 MMOL/L (ref 8–16)
AST SERPL-CCNC: 13 U/L (ref 10–40)
BASOPHILS # BLD AUTO: 0.05 K/UL (ref 0–0.2)
BASOPHILS NFR BLD: 1.1 % (ref 0–1.9)
BILIRUB SERPL-MCNC: 0.4 MG/DL (ref 0.1–1)
BUN SERPL-MCNC: 47 MG/DL (ref 6–20)
CALCIUM SERPL-MCNC: 9.2 MG/DL (ref 8.7–10.5)
CHLORIDE SERPL-SCNC: 113 MMOL/L (ref 95–110)
CO2 SERPL-SCNC: 13 MMOL/L (ref 23–29)
CREAT SERPL-MCNC: 2.9 MG/DL (ref 0.5–1.4)
DIFFERENTIAL METHOD: ABNORMAL
EOSINOPHIL # BLD AUTO: 0.2 K/UL (ref 0–0.5)
EOSINOPHIL NFR BLD: 4.5 % (ref 0–8)
ERYTHROCYTE [DISTWIDTH] IN BLOOD BY AUTOMATED COUNT: 14.3 % (ref 11.5–14.5)
EST. GFR  (AFRICAN AMERICAN): 26 ML/MIN/1.73 M^2
EST. GFR  (NON AFRICAN AMERICAN): 22.5 ML/MIN/1.73 M^2
FERRITIN SERPL-MCNC: 175 NG/ML (ref 20–300)
GLUCOSE SERPL-MCNC: 118 MG/DL (ref 70–110)
HCT VFR BLD AUTO: 28.3 % (ref 40–54)
HGB BLD-MCNC: 9 G/DL (ref 14–18)
IMM GRANULOCYTES # BLD AUTO: 0.03 K/UL (ref 0–0.04)
IMM GRANULOCYTES NFR BLD AUTO: 0.6 % (ref 0–0.5)
IRON SERPL-MCNC: 68 UG/DL (ref 45–160)
LYMPHOCYTES # BLD AUTO: 1 K/UL (ref 1–4.8)
LYMPHOCYTES NFR BLD: 20.4 % (ref 18–48)
MCH RBC QN AUTO: 29.1 PG (ref 27–31)
MCHC RBC AUTO-ENTMCNC: 31.8 G/DL (ref 32–36)
MCV RBC AUTO: 92 FL (ref 82–98)
MONOCYTES # BLD AUTO: 0.4 K/UL (ref 0.3–1)
MONOCYTES NFR BLD: 8.5 % (ref 4–15)
NEUTROPHILS # BLD AUTO: 3.1 K/UL (ref 1.8–7.7)
NEUTROPHILS NFR BLD: 64.9 % (ref 38–73)
NRBC BLD-RTO: 0 /100 WBC
PLATELET # BLD AUTO: 165 K/UL (ref 150–450)
PMV BLD AUTO: 9.9 FL (ref 9.2–12.9)
POTASSIUM SERPL-SCNC: 5.5 MMOL/L (ref 3.5–5.1)
PROT SERPL-MCNC: 6.6 G/DL (ref 6–8.4)
RBC # BLD AUTO: 3.09 M/UL (ref 4.6–6.2)
SATURATED IRON: 24 % (ref 20–50)
SODIUM SERPL-SCNC: 134 MMOL/L (ref 136–145)
TOTAL IRON BINDING CAPACITY: 286 UG/DL (ref 250–450)
TRANSFERRIN SERPL-MCNC: 193 MG/DL (ref 200–375)
WBC # BLD AUTO: 4.71 K/UL (ref 3.9–12.7)

## 2022-01-19 PROCEDURE — 36415 COLL VENOUS BLD VENIPUNCTURE: CPT | Mod: PO | Performed by: INTERNAL MEDICINE

## 2022-01-19 PROCEDURE — 85025 COMPLETE CBC W/AUTO DIFF WBC: CPT | Performed by: INTERNAL MEDICINE

## 2022-01-19 PROCEDURE — 84466 ASSAY OF TRANSFERRIN: CPT | Performed by: INTERNAL MEDICINE

## 2022-01-19 PROCEDURE — 82728 ASSAY OF FERRITIN: CPT | Performed by: INTERNAL MEDICINE

## 2022-01-19 PROCEDURE — 80053 COMPREHEN METABOLIC PANEL: CPT | Performed by: INTERNAL MEDICINE

## 2022-01-20 ENCOUNTER — OFFICE VISIT (OUTPATIENT)
Dept: HEMATOLOGY/ONCOLOGY | Facility: CLINIC | Age: 62
End: 2022-01-20
Payer: MEDICAID

## 2022-01-20 ENCOUNTER — INFUSION (OUTPATIENT)
Dept: INFUSION THERAPY | Facility: HOSPITAL | Age: 62
End: 2022-01-20
Attending: INTERNAL MEDICINE
Payer: MEDICAID

## 2022-01-20 VITALS
HEART RATE: 60 BPM | HEIGHT: 70 IN | TEMPERATURE: 98 F | SYSTOLIC BLOOD PRESSURE: 96 MMHG | OXYGEN SATURATION: 98 % | DIASTOLIC BLOOD PRESSURE: 50 MMHG | RESPIRATION RATE: 20 BRPM | WEIGHT: 177.5 LBS | TEMPERATURE: 98 F | DIASTOLIC BLOOD PRESSURE: 50 MMHG | HEART RATE: 60 BPM | OXYGEN SATURATION: 98 % | HEIGHT: 70 IN | WEIGHT: 177.5 LBS | BODY MASS INDEX: 25.41 KG/M2 | BODY MASS INDEX: 25.41 KG/M2 | RESPIRATION RATE: 20 BRPM | SYSTOLIC BLOOD PRESSURE: 96 MMHG

## 2022-01-20 DIAGNOSIS — D63.1 ANEMIA DUE TO STAGE 4 CHRONIC KIDNEY DISEASE: Primary | ICD-10-CM

## 2022-01-20 DIAGNOSIS — N18.4 ANEMIA DUE TO STAGE 4 CHRONIC KIDNEY DISEASE: Primary | ICD-10-CM

## 2022-01-20 DIAGNOSIS — N18.32 ANEMIA IN STAGE 3B CHRONIC KIDNEY DISEASE: Primary | ICD-10-CM

## 2022-01-20 DIAGNOSIS — D63.1 ANEMIA IN STAGE 3B CHRONIC KIDNEY DISEASE: Primary | ICD-10-CM

## 2022-01-20 DIAGNOSIS — I73.9 PAD (PERIPHERAL ARTERY DISEASE): ICD-10-CM

## 2022-01-20 DIAGNOSIS — I25.10 CORONARY ARTERY DISEASE INVOLVING NATIVE CORONARY ARTERY OF NATIVE HEART WITHOUT ANGINA PECTORIS: ICD-10-CM

## 2022-01-20 PROBLEM — D64.9 SYMPTOMATIC ANEMIA: Status: RESOLVED | Noted: 2021-07-15 | Resolved: 2022-01-20

## 2022-01-20 PROBLEM — Z98.890 POST-OPERATIVE STATE: Status: RESOLVED | Noted: 2020-07-02 | Resolved: 2022-01-20

## 2022-01-20 PROBLEM — R55 SYNCOPE: Status: RESOLVED | Noted: 2019-10-04 | Resolved: 2022-01-20

## 2022-01-20 PROBLEM — N18.30 CKD (CHRONIC KIDNEY DISEASE), STAGE III: Status: RESOLVED | Noted: 2020-04-06 | Resolved: 2022-01-20

## 2022-01-20 PROCEDURE — 99214 OFFICE O/P EST MOD 30 MIN: CPT | Mod: PBBFAC,PO | Performed by: INTERNAL MEDICINE

## 2022-01-20 PROCEDURE — 3078F PR MOST RECENT DIASTOLIC BLOOD PRESSURE < 80 MM HG: ICD-10-PCS | Mod: CPTII,,, | Performed by: INTERNAL MEDICINE

## 2022-01-20 PROCEDURE — 96372 THER/PROPH/DIAG INJ SC/IM: CPT

## 2022-01-20 PROCEDURE — 99999 PR PBB SHADOW E&M-EST. PATIENT-LVL IV: ICD-10-PCS | Mod: PBBFAC,,, | Performed by: INTERNAL MEDICINE

## 2022-01-20 PROCEDURE — 3008F BODY MASS INDEX DOCD: CPT | Mod: CPTII,,, | Performed by: INTERNAL MEDICINE

## 2022-01-20 PROCEDURE — 99999 PR PBB SHADOW E&M-EST. PATIENT-LVL IV: CPT | Mod: PBBFAC,,, | Performed by: INTERNAL MEDICINE

## 2022-01-20 PROCEDURE — 1159F PR MEDICATION LIST DOCUMENTED IN MEDICAL RECORD: ICD-10-PCS | Mod: CPTII,,, | Performed by: INTERNAL MEDICINE

## 2022-01-20 PROCEDURE — 1160F PR REVIEW ALL MEDS BY PRESCRIBER/CLIN PHARMACIST DOCUMENTED: ICD-10-PCS | Mod: CPTII,,, | Performed by: INTERNAL MEDICINE

## 2022-01-20 PROCEDURE — 1159F MED LIST DOCD IN RCRD: CPT | Mod: CPTII,,, | Performed by: INTERNAL MEDICINE

## 2022-01-20 PROCEDURE — 3008F PR BODY MASS INDEX (BMI) DOCUMENTED: ICD-10-PCS | Mod: CPTII,,, | Performed by: INTERNAL MEDICINE

## 2022-01-20 PROCEDURE — 99214 PR OFFICE/OUTPT VISIT, EST, LEVL IV, 30-39 MIN: ICD-10-PCS | Mod: S$PBB,,, | Performed by: INTERNAL MEDICINE

## 2022-01-20 PROCEDURE — 3074F SYST BP LT 130 MM HG: CPT | Mod: CPTII,,, | Performed by: INTERNAL MEDICINE

## 2022-01-20 PROCEDURE — 3078F DIAST BP <80 MM HG: CPT | Mod: CPTII,,, | Performed by: INTERNAL MEDICINE

## 2022-01-20 PROCEDURE — 1160F RVW MEDS BY RX/DR IN RCRD: CPT | Mod: CPTII,,, | Performed by: INTERNAL MEDICINE

## 2022-01-20 PROCEDURE — 3074F PR MOST RECENT SYSTOLIC BLOOD PRESSURE < 130 MM HG: ICD-10-PCS | Mod: CPTII,,, | Performed by: INTERNAL MEDICINE

## 2022-01-20 PROCEDURE — 63600175 PHARM REV CODE 636 W HCPCS: Mod: JG | Performed by: INTERNAL MEDICINE

## 2022-01-20 PROCEDURE — 99214 OFFICE O/P EST MOD 30 MIN: CPT | Mod: S$PBB,,, | Performed by: INTERNAL MEDICINE

## 2022-01-20 RX ADMIN — EPOETIN ALFA-EPBX 40000 UNITS: 40000 INJECTION, SOLUTION INTRAVENOUS; SUBCUTANEOUS at 03:01

## 2022-01-20 NOTE — NURSING
Pt tolerated Retacrit injection well, no complaints at this time. No adverse reactions noted. Next appointment scheduled and pt d/c in no acute distress.

## 2022-01-20 NOTE — PROGRESS NOTES
"PATIENT: Domingo Gross  MRN: [id number]  DATE: 1/20/2022    Chief Complaint: anemia 2/2 CKD    Subjective:     History of Present Illness:     Patient has CAD, PAD amongst other comorbidities, takes aspirin and Plavix.    He was admitted to the hospital in July 2021 with symptomatic anemia, hemoglobin 6.2, creatinine 3.3, transfused 2 units packed red blood cells.  He was evaluated by Gastroenterology, has not undergone EGD or colonoscopy while inpatient.    Recent and prior CBCs reviewed    Hemoglobin between 9-10 grams/deciliter in 2019    Hemoglobin between 8-9 grams/deciliter in 2020    Hemoglobin 6 on 07/15/2021    10/12/2021 Bone marrow biopsy did not show any evidence of leukemia or myelodysplasia    Underwent 1 unit PRBC on 10/12/2021, 1 unit 12/3/2021    INTERVAL HISTORY:   -has tiredness and fatigue  -received 4 doses of Venofer November/December 2021  -started Retacrit 34273 units every 2 weeks, starting 12/23/2021    Past Medical, Surgical, Family and Social History Reviewed.    Medications and Allergies reviewed      Review of Systems   Constitutional: Positive for fatigue. Negative for fever and unexpected weight change.       Objective:     Vitals:    01/20/22 1444   BP: (!) 96/50   BP Location: Right arm   Patient Position: Sitting   BP Method: Medium (Automatic)   Pulse: 60   Resp: 20   Temp: 97.6 °F (36.4 °C)   TempSrc: Oral   SpO2: 98%   Weight: 80.5 kg (177 lb 7.5 oz)   Height: 5' 10" (1.778 m)       BMI: Body mass index is 25.46 kg/m².    Physical Exam  Vitals and nursing note reviewed.   Constitutional:       General: He is not in acute distress.  Pulmonary:      Effort: Pulmonary effort is normal.      Breath sounds: Normal breath sounds.   Neurological:      Mental Status: He is alert. Mental status is at baseline.       Assessment:       1. Anemia in stage 3b chronic kidney disease    2. PAD (peripheral artery disease)    3. Coronary artery disease involving native coronary artery of native " heart without angina pectoris        Plan:   Anemia secondary to chronic kidney disease  -10/12/2021 BMBx did not reveal any evidence of leukemia or myelodysplasia  -worsening anemia secondary to stage 4 chronic kidney disease  -received 1 a for November/December 2021  -started Retacrit December 2021, 45400 units every 2 weeks  -reviewed CBC, CMP, ferritin, iron saturation  -continue Retacrit 21866 units every 2 weeks  -check CBC, CMP, ferritin, iron saturation in 4 weeks    Coronary artery disease/peripheral arterial disease  -follows with cardiology, Dr. Jenkins  -on aspirin and Plavix    Chronic kidney disease, Stage 4   -creatinine stable

## 2022-01-23 NOTE — PATIENT INSTRUCTIONS
"Patient Education       Peripheral Artery Disease and Claudication   The Basics   Written by the doctors and editors at [facility]   What is peripheral artery disease? -- Peripheral artery disease (PAD) is a condition that affects the blood vessels (called arteries) that bring blood to the legs. PAD can cause muscle pain that gets worse with activity and better with rest, called "claudication." PAD can also cause wounds to heal more slowly than usual. This article is only about the leg pain related to PAD.  Normally, blood flows easily through arteries to all parts of the body. But sometimes, fatty clumps called "plaques" build up inside the walls of arteries (figure 1). Plaques can cause arteries to become narrow or blocked. This prevents blood from flowing normally. When muscles do not get enough blood, symptoms can occur.  Some people have a greater chance of getting PAD, such as those who:  · Smoke  · Have diabetes  · Have high cholesterol  · Have high blood pressure  What are the symptoms of PAD? -- PAD often causes pain in the back of the lower leg. The pain usually gets worse with walking or other exercise, and gets better with rest. PAD can also cause pain in the buttocks, thighs, or sometimes in the feet. People who have leg pain can have other symptoms, too, such as:  · Trouble walking up stairs  · Trouble with sexual arousal   Symptoms of claudication can be mild or severe, depending on:  · Which arteries are affected  · How narrow the arteries are  · How much activity a person does  Is there a test for PAD? -- Yes. Your doctor or nurse can do different tests to find out if you have PAD, and to check how severe it is. Your doctor might:  · Take the blood pressure in your arm and lower leg (just above the ankle) at rest and right after exercise, and compare them  · Take the blood pressure in other places in your leg (like the thigh)   · Order a blood vessel imaging test such as an ultrasound, which can show " pictures of your leg arteries  How can I help treat my PAD? -- To help treat your PAD and prevent it from getting worse, you can:  · Stop smoking  · Get your diabetes, high blood pressure, and high cholesterol under control (if you have these conditions)  · Walking - Doctors recommend that most people with PAD walk every day. Ask your doctor or nurse how best to begin a walking program.  What other treatments might I have? -- Along with a walking program and getting medical conditions under control, some people are also treated with medicines. The medicines used to treat PAD can reduce symptoms, increase blood flow to the legs, and help people walk farther without pain. Most doctors ask their patients to try a medicine called cilostazol (brand name: Pletal). But people who have certain heart problems cannot take cilostazol and must take a different medicine.  If you still have severe symptoms after trying medicines, your doctor will talk with you about the possibility of having a procedure to increase blood flow to your legs and feet. Your treatment options might include:  · Angioplasty or stenting - During angioplasty or stenting, the doctor sends a thin tube with a balloon at the end of it to the part of the artery that is blocked. Then the doctor inflates the balloon to open the blockage. Often the doctor props open the artery using a tiny mesh tube called a stent, which stays in the body.  · Bypass surgery - During bypass surgery, the doctor removes a piece of blood vessel (vein) from another part of the body. Then they reattach that piece of blood vessel (called a vein graft) above and below the area that is clogged. This re-routes blood around the clog, and allows it to get to the part of the leg that was not getting enough blood. Sometimes instead of taking a graft from another part of the body, the doctor can use a man-made graft.  What should I know about the different procedures? -- Angioplasty and  stenting work best for treating blocked areas that are short. Surgery works better long-term for treating blocked areas that are long. People who have the fewest long-term problems after bypass surgery include those who are younger than 70 and do not have diabetes.  Your doctor might recommend a procedure for you, depending on your symptoms, age, and medical problems. But many people can choose which procedure to have. If your doctor offers you a choice, ask:  · What are the benefits of each procedure for me?  · What are the downsides of each procedure for me?  · What happens if I do not have any procedure?  All topics are updated as new evidence becomes available and our peer review process is complete.  This topic retrieved from [employer]Box on: Sep 21, 2021.  Topic 16843 Version 10.0  Release: 29.4.2 - C29.263  © 2021 UpToDate, Inc. and/or its affiliates. All rights reserved.  figure 1: Peripheral artery disease     Graphic 73509 Version 6.0    Consumer Information Use and Disclaimer   This information is not specific medical advice and does not replace information you receive from your health care provider. This is only a brief summary of general information. It does NOT include all information about conditions, illnesses, injuries, tests, procedures, treatments, therapies, discharge instructions or life-style choices that may apply to you. You must talk with your health care provider for complete information about your health and treatment options. This information should not be used to decide whether or not to accept your health care provider's advice, instructions or recommendations. Only your health care provider has the knowledge and training to provide advice that is right for you. The use of this information is governed by the [employer] End User License Agreement, available at https://www.Opp.[employer]/en/solutions/[employer]/about/cl.The use of [employer]Box content is governed by the gBox Terms of Use.  ©2021 [employer]. All rights reserved.  Copyright   © 2021 [employer], Inc. and/or its affiliates. All rights reserved.

## 2022-02-01 ENCOUNTER — HOSPITAL ENCOUNTER (OUTPATIENT)
Facility: HOSPITAL | Age: 62
Discharge: HOME | End: 2022-02-03
Attending: EMERGENCY MEDICINE | Admitting: HOSPITALIST
Payer: MEDICAID

## 2022-02-01 ENCOUNTER — LAB VISIT (OUTPATIENT)
Dept: FAMILY MEDICINE | Facility: CLINIC | Age: 62
End: 2022-02-01
Payer: MEDICAID

## 2022-02-01 ENCOUNTER — PATIENT MESSAGE (OUTPATIENT)
Dept: CARDIOLOGY | Facility: CLINIC | Age: 62
End: 2022-02-01

## 2022-02-01 ENCOUNTER — TELEPHONE (OUTPATIENT)
Dept: CARDIOLOGY | Facility: CLINIC | Age: 62
End: 2022-02-01
Payer: MEDICAID

## 2022-02-01 ENCOUNTER — TELEPHONE (OUTPATIENT)
Dept: CARDIOLOGY | Facility: CLINIC | Age: 62
End: 2022-02-01

## 2022-02-01 DIAGNOSIS — R07.9 CHEST PAIN: ICD-10-CM

## 2022-02-01 DIAGNOSIS — I73.9 PAD (PERIPHERAL ARTERY DISEASE): ICD-10-CM

## 2022-02-01 DIAGNOSIS — R80.9 NEPHROTIC RANGE PROTEINURIA: ICD-10-CM

## 2022-02-01 DIAGNOSIS — E87.20 METABOLIC ACIDOSIS: ICD-10-CM

## 2022-02-01 DIAGNOSIS — I10 ESSENTIAL HYPERTENSION: ICD-10-CM

## 2022-02-01 DIAGNOSIS — N18.4 CKD (CHRONIC KIDNEY DISEASE), STAGE IV: ICD-10-CM

## 2022-02-01 DIAGNOSIS — N18.4 CKD (CHRONIC KIDNEY DISEASE) STAGE 4, GFR 15-29 ML/MIN: ICD-10-CM

## 2022-02-01 DIAGNOSIS — E87.5 HYPERKALEMIA: Primary | ICD-10-CM

## 2022-02-01 DIAGNOSIS — Z01.818 PRE-OP TESTING: ICD-10-CM

## 2022-02-01 LAB
ALBUMIN SERPL BCP-MCNC: 3.6 G/DL (ref 3.5–5.2)
ALP SERPL-CCNC: 57 U/L (ref 55–135)
ALT SERPL W/O P-5'-P-CCNC: 11 U/L (ref 10–44)
ANION GAP SERPL CALC-SCNC: 5 MMOL/L (ref 8–16)
ANION GAP SERPL CALC-SCNC: 5 MMOL/L (ref 8–16)
ANION GAP SERPL CALC-SCNC: 7 MMOL/L (ref 8–16)
AST SERPL-CCNC: 14 U/L (ref 10–40)
BACTERIA #/AREA URNS HPF: NORMAL /HPF
BASOPHILS # BLD AUTO: 0.03 K/UL (ref 0–0.2)
BASOPHILS NFR BLD: 0.7 % (ref 0–1.9)
BILIRUB SERPL-MCNC: 0.5 MG/DL (ref 0.1–1)
BILIRUB UR QL STRIP: NEGATIVE
BUN SERPL-MCNC: 34 MG/DL (ref 6–20)
BUN SERPL-MCNC: 35 MG/DL (ref 6–20)
BUN SERPL-MCNC: 35 MG/DL (ref 6–20)
CALCIUM SERPL-MCNC: 8.3 MG/DL (ref 8.7–10.5)
CALCIUM SERPL-MCNC: 8.8 MG/DL (ref 8.7–10.5)
CALCIUM SERPL-MCNC: 9.5 MG/DL (ref 8.7–10.5)
CHLORIDE SERPL-SCNC: 113 MMOL/L (ref 95–110)
CHLORIDE SERPL-SCNC: 114 MMOL/L (ref 95–110)
CHLORIDE SERPL-SCNC: 116 MMOL/L (ref 95–110)
CLARITY UR: CLEAR
CO2 SERPL-SCNC: 12 MMOL/L (ref 23–29)
CO2 SERPL-SCNC: 16 MMOL/L (ref 23–29)
CO2 SERPL-SCNC: 16 MMOL/L (ref 23–29)
COLOR UR: COLORLESS
CREAT SERPL-MCNC: 2.4 MG/DL (ref 0.5–1.4)
CREAT SERPL-MCNC: 2.4 MG/DL (ref 0.5–1.4)
CREAT SERPL-MCNC: 2.5 MG/DL (ref 0.5–1.4)
CREAT UR-MCNC: 55.7 MG/DL (ref 23–375)
CTP QC/QA: YES
DIFFERENTIAL METHOD: ABNORMAL
EOSINOPHIL # BLD AUTO: 0.2 K/UL (ref 0–0.5)
EOSINOPHIL NFR BLD: 3.7 % (ref 0–8)
ERYTHROCYTE [DISTWIDTH] IN BLOOD BY AUTOMATED COUNT: 14.4 % (ref 11.5–14.5)
EST. GFR  (AFRICAN AMERICAN): 31 ML/MIN/1.73 M^2
EST. GFR  (AFRICAN AMERICAN): 33 ML/MIN/1.73 M^2
EST. GFR  (AFRICAN AMERICAN): 33 ML/MIN/1.73 M^2
EST. GFR  (NON AFRICAN AMERICAN): 27 ML/MIN/1.73 M^2
EST. GFR  (NON AFRICAN AMERICAN): 28 ML/MIN/1.73 M^2
EST. GFR  (NON AFRICAN AMERICAN): 28 ML/MIN/1.73 M^2
GLUCOSE SERPL-MCNC: 109 MG/DL (ref 70–110)
GLUCOSE SERPL-MCNC: 126 MG/DL (ref 70–110)
GLUCOSE SERPL-MCNC: 94 MG/DL (ref 70–110)
GLUCOSE UR QL STRIP: NEGATIVE
HCT VFR BLD AUTO: 31 % (ref 40–54)
HGB BLD-MCNC: 9.9 G/DL (ref 14–18)
HGB UR QL STRIP: NEGATIVE
HYALINE CASTS #/AREA URNS LPF: 0 /LPF
IMM GRANULOCYTES # BLD AUTO: 0.01 K/UL (ref 0–0.04)
IMM GRANULOCYTES NFR BLD AUTO: 0.2 % (ref 0–0.5)
KETONES UR QL STRIP: NEGATIVE
LEUKOCYTE ESTERASE UR QL STRIP: NEGATIVE
LYMPHOCYTES # BLD AUTO: 0.7 K/UL (ref 1–4.8)
LYMPHOCYTES NFR BLD: 17.5 % (ref 18–48)
MCH RBC QN AUTO: 28.8 PG (ref 27–31)
MCHC RBC AUTO-ENTMCNC: 31.9 G/DL (ref 32–36)
MCV RBC AUTO: 90 FL (ref 82–98)
MICROSCOPIC COMMENT: NORMAL
MONOCYTES # BLD AUTO: 0.3 K/UL (ref 0.3–1)
MONOCYTES NFR BLD: 8 % (ref 4–15)
NEUTROPHILS # BLD AUTO: 2.8 K/UL (ref 1.8–7.7)
NEUTROPHILS NFR BLD: 69.9 % (ref 38–73)
NITRITE UR QL STRIP: NEGATIVE
NRBC BLD-RTO: 0 /100 WBC
PH UR STRIP: 6 [PH] (ref 5–8)
PLATELET # BLD AUTO: 151 K/UL (ref 150–450)
PMV BLD AUTO: 10.1 FL (ref 9.2–12.9)
POCT GLUCOSE: 104 MG/DL (ref 70–110)
POCT GLUCOSE: 120 MG/DL (ref 70–110)
POCT GLUCOSE: 136 MG/DL (ref 70–110)
POCT GLUCOSE: 210 MG/DL (ref 70–110)
POCT GLUCOSE: 29 MG/DL (ref 70–110)
POCT GLUCOSE: 55 MG/DL (ref 70–110)
POCT GLUCOSE: 71 MG/DL (ref 70–110)
POCT GLUCOSE: 86 MG/DL (ref 70–110)
POCT GLUCOSE: 92 MG/DL (ref 70–110)
POCT GLUCOSE: 93 MG/DL (ref 70–110)
POCT GLUCOSE: 98 MG/DL (ref 70–110)
POTASSIUM SERPL-SCNC: 6.4 MMOL/L (ref 3.5–5.1)
POTASSIUM SERPL-SCNC: 6.5 MMOL/L (ref 3.5–5.1)
POTASSIUM SERPL-SCNC: 7.3 MMOL/L (ref 3.5–5.1)
PROT SERPL-MCNC: 6.3 G/DL (ref 6–8.4)
PROT UR QL STRIP: ABNORMAL
PROT UR-MCNC: 123 MG/DL (ref 0–15)
PROT/CREAT UR: 2.21 MG/G{CREAT} (ref 0–0.2)
RBC # BLD AUTO: 3.44 M/UL (ref 4.6–6.2)
RBC #/AREA URNS HPF: 3 /HPF (ref 0–4)
SARS-COV-2 RDRP RESP QL NAA+PROBE: NEGATIVE
SARS-COV-2 RNA RESP QL NAA+PROBE: NOT DETECTED
SARS-COV-2- CYCLE NUMBER: NORMAL
SODIUM SERPL-SCNC: 134 MMOL/L (ref 136–145)
SODIUM SERPL-SCNC: 135 MMOL/L (ref 136–145)
SODIUM SERPL-SCNC: 135 MMOL/L (ref 136–145)
SP GR UR STRIP: 1.01 (ref 1–1.03)
URN SPEC COLLECT METH UR: ABNORMAL
UROBILINOGEN UR STRIP-ACNC: NEGATIVE EU/DL
WBC # BLD AUTO: 4.01 K/UL (ref 3.9–12.7)
WBC #/AREA URNS HPF: 2 /HPF (ref 0–5)

## 2022-02-01 PROCEDURE — 96372 THER/PROPH/DIAG INJ SC/IM: CPT | Mod: 59

## 2022-02-01 PROCEDURE — 81000 URINALYSIS NONAUTO W/SCOPE: CPT | Performed by: INTERNAL MEDICINE

## 2022-02-01 PROCEDURE — 99219 PR INITIAL OBSERVATION CARE,LEVL II: CPT | Mod: ,,, | Performed by: NURSE PRACTITIONER

## 2022-02-01 PROCEDURE — 99219 PR INITIAL OBSERVATION CARE,LEVL II: ICD-10-PCS | Mod: ,,, | Performed by: NURSE PRACTITIONER

## 2022-02-01 PROCEDURE — 96375 TX/PRO/DX INJ NEW DRUG ADDON: CPT

## 2022-02-01 PROCEDURE — 25000242 PHARM REV CODE 250 ALT 637 W/ HCPCS: Performed by: PHYSICIAN ASSISTANT

## 2022-02-01 PROCEDURE — 63600175 PHARM REV CODE 636 W HCPCS: Performed by: INTERNAL MEDICINE

## 2022-02-01 PROCEDURE — 93010 ELECTROCARDIOGRAM REPORT: CPT | Mod: ,,, | Performed by: INTERNAL MEDICINE

## 2022-02-01 PROCEDURE — 96376 TX/PRO/DX INJ SAME DRUG ADON: CPT

## 2022-02-01 PROCEDURE — 80048 BASIC METABOLIC PNL TOTAL CA: CPT | Mod: 91 | Performed by: NURSE PRACTITIONER

## 2022-02-01 PROCEDURE — 99285 EMERGENCY DEPT VISIT HI MDM: CPT | Mod: 25

## 2022-02-01 PROCEDURE — 80053 COMPREHEN METABOLIC PANEL: CPT | Performed by: PHYSICIAN ASSISTANT

## 2022-02-01 PROCEDURE — U0003 INFECTIOUS AGENT DETECTION BY NUCLEIC ACID (DNA OR RNA); SEVERE ACUTE RESPIRATORY SYNDROME CORONAVIRUS 2 (SARS-COV-2) (CORONAVIRUS DISEASE [COVID-19]), AMPLIFIED PROBE TECHNIQUE, MAKING USE OF HIGH THROUGHPUT TECHNOLOGIES AS DESCRIBED BY CMS-2020-01-R: HCPCS | Performed by: INTERNAL MEDICINE

## 2022-02-01 PROCEDURE — 82962 GLUCOSE BLOOD TEST: CPT

## 2022-02-01 PROCEDURE — 93010 EKG 12-LEAD: ICD-10-PCS | Mod: ,,, | Performed by: INTERNAL MEDICINE

## 2022-02-01 PROCEDURE — 94761 N-INVAS EAR/PLS OXIMETRY MLT: CPT

## 2022-02-01 PROCEDURE — 94640 AIRWAY INHALATION TREATMENT: CPT

## 2022-02-01 PROCEDURE — U0005 INFEC AGEN DETEC AMPLI PROBE: HCPCS | Performed by: INTERNAL MEDICINE

## 2022-02-01 PROCEDURE — 96366 THER/PROPH/DIAG IV INF ADDON: CPT

## 2022-02-01 PROCEDURE — G0378 HOSPITAL OBSERVATION PER HR: HCPCS

## 2022-02-01 PROCEDURE — U0002 COVID-19 LAB TEST NON-CDC: HCPCS | Performed by: PHYSICIAN ASSISTANT

## 2022-02-01 PROCEDURE — 63600175 PHARM REV CODE 636 W HCPCS: Performed by: PHYSICIAN ASSISTANT

## 2022-02-01 PROCEDURE — A4216 STERILE WATER/SALINE, 10 ML: HCPCS | Performed by: HOSPITALIST

## 2022-02-01 PROCEDURE — 96367 TX/PROPH/DG ADDL SEQ IV INF: CPT

## 2022-02-01 PROCEDURE — 25000003 PHARM REV CODE 250: Performed by: PHYSICIAN ASSISTANT

## 2022-02-01 PROCEDURE — 96365 THER/PROPH/DIAG IV INF INIT: CPT

## 2022-02-01 PROCEDURE — 25000003 PHARM REV CODE 250: Performed by: INTERNAL MEDICINE

## 2022-02-01 PROCEDURE — 25000242 PHARM REV CODE 250 ALT 637 W/ HCPCS: Performed by: INTERNAL MEDICINE

## 2022-02-01 PROCEDURE — 96372 THER/PROPH/DIAG INJ SC/IM: CPT | Performed by: HOSPITALIST

## 2022-02-01 PROCEDURE — 80048 BASIC METABOLIC PNL TOTAL CA: CPT | Mod: 91 | Performed by: INTERNAL MEDICINE

## 2022-02-01 PROCEDURE — 63600175 PHARM REV CODE 636 W HCPCS: Performed by: HOSPITALIST

## 2022-02-01 PROCEDURE — 82570 ASSAY OF URINE CREATININE: CPT | Performed by: INTERNAL MEDICINE

## 2022-02-01 PROCEDURE — 93005 ELECTROCARDIOGRAM TRACING: CPT

## 2022-02-01 PROCEDURE — 85025 COMPLETE CBC W/AUTO DIFF WBC: CPT | Mod: 91 | Performed by: PHYSICIAN ASSISTANT

## 2022-02-01 PROCEDURE — 25000003 PHARM REV CODE 250: Performed by: HOSPITALIST

## 2022-02-01 RX ORDER — INSULIN ASPART 100 [IU]/ML
0-5 INJECTION, SOLUTION INTRAVENOUS; SUBCUTANEOUS
Status: DISCONTINUED | OUTPATIENT
Start: 2022-02-01 | End: 2022-02-03 | Stop reason: HOSPADM

## 2022-02-01 RX ORDER — SODIUM CHLORIDE 0.9 % (FLUSH) 0.9 %
10 SYRINGE (ML) INJECTION EVERY 8 HOURS
Status: DISCONTINUED | OUTPATIENT
Start: 2022-02-01 | End: 2022-02-03 | Stop reason: HOSPADM

## 2022-02-01 RX ORDER — DEXTROSE 50 % IN WATER (D50W) INTRAVENOUS SYRINGE
25
Status: COMPLETED | OUTPATIENT
Start: 2022-02-01 | End: 2022-02-01

## 2022-02-01 RX ORDER — GABAPENTIN 300 MG/1
300 CAPSULE ORAL 3 TIMES DAILY PRN
Status: DISCONTINUED | OUTPATIENT
Start: 2022-02-01 | End: 2022-02-03 | Stop reason: HOSPADM

## 2022-02-01 RX ORDER — ENOXAPARIN SODIUM 100 MG/ML
40 INJECTION SUBCUTANEOUS EVERY 24 HOURS
Status: DISCONTINUED | OUTPATIENT
Start: 2022-02-01 | End: 2022-02-03 | Stop reason: HOSPADM

## 2022-02-01 RX ORDER — GLUCAGON 1 MG
1 KIT INJECTION
Status: DISCONTINUED | OUTPATIENT
Start: 2022-02-01 | End: 2022-02-03 | Stop reason: HOSPADM

## 2022-02-01 RX ORDER — DOXAZOSIN 2 MG/1
4 TABLET ORAL NIGHTLY
Status: DISCONTINUED | OUTPATIENT
Start: 2022-02-01 | End: 2022-02-03 | Stop reason: HOSPADM

## 2022-02-01 RX ORDER — ALBUTEROL SULFATE 2.5 MG/.5ML
10 SOLUTION RESPIRATORY (INHALATION) ONCE
Status: COMPLETED | OUTPATIENT
Start: 2022-02-01 | End: 2022-02-01

## 2022-02-01 RX ORDER — FUROSEMIDE 10 MG/ML
20 INJECTION INTRAMUSCULAR; INTRAVENOUS ONCE
Status: DISCONTINUED | OUTPATIENT
Start: 2022-02-01 | End: 2022-02-02

## 2022-02-01 RX ORDER — CLOPIDOGREL BISULFATE 75 MG/1
75 TABLET ORAL DAILY
Status: DISCONTINUED | OUTPATIENT
Start: 2022-02-02 | End: 2022-02-03 | Stop reason: HOSPADM

## 2022-02-01 RX ORDER — NIFEDIPINE 30 MG/1
60 TABLET, EXTENDED RELEASE ORAL DAILY
Status: DISCONTINUED | OUTPATIENT
Start: 2022-02-02 | End: 2022-02-03 | Stop reason: HOSPADM

## 2022-02-01 RX ORDER — IBUPROFEN 200 MG
16 TABLET ORAL
Status: DISCONTINUED | OUTPATIENT
Start: 2022-02-01 | End: 2022-02-03 | Stop reason: HOSPADM

## 2022-02-01 RX ORDER — HYDRALAZINE HYDROCHLORIDE 25 MG/1
100 TABLET, FILM COATED ORAL 2 TIMES DAILY
Status: DISCONTINUED | OUTPATIENT
Start: 2022-02-01 | End: 2022-02-03 | Stop reason: HOSPADM

## 2022-02-01 RX ORDER — ASPIRIN 81 MG/1
81 TABLET ORAL DAILY
Status: DISCONTINUED | OUTPATIENT
Start: 2022-02-02 | End: 2022-02-03 | Stop reason: HOSPADM

## 2022-02-01 RX ORDER — ONDANSETRON 2 MG/ML
4 INJECTION INTRAMUSCULAR; INTRAVENOUS EVERY 8 HOURS PRN
Status: DISCONTINUED | OUTPATIENT
Start: 2022-02-01 | End: 2022-02-03 | Stop reason: HOSPADM

## 2022-02-01 RX ORDER — SODIUM BICARBONATE 1 MEQ/ML
25 SYRINGE (ML) INTRAVENOUS
Status: COMPLETED | OUTPATIENT
Start: 2022-02-01 | End: 2022-02-01

## 2022-02-01 RX ORDER — EPINEPHRINE 0.22MG
100 AEROSOL WITH ADAPTER (ML) INHALATION DAILY
COMMUNITY

## 2022-02-01 RX ORDER — ATORVASTATIN CALCIUM 40 MG/1
80 TABLET, FILM COATED ORAL DAILY
Status: DISCONTINUED | OUTPATIENT
Start: 2022-02-02 | End: 2022-02-03 | Stop reason: HOSPADM

## 2022-02-01 RX ORDER — NALOXONE HCL 0.4 MG/ML
0.02 VIAL (ML) INJECTION
Status: DISCONTINUED | OUTPATIENT
Start: 2022-02-01 | End: 2022-02-03 | Stop reason: HOSPADM

## 2022-02-01 RX ORDER — ALBUTEROL SULFATE 2.5 MG/.5ML
10 SOLUTION RESPIRATORY (INHALATION)
Status: COMPLETED | OUTPATIENT
Start: 2022-02-01 | End: 2022-02-01

## 2022-02-01 RX ORDER — IBUPROFEN 200 MG
24 TABLET ORAL
Status: DISCONTINUED | OUTPATIENT
Start: 2022-02-01 | End: 2022-02-03 | Stop reason: HOSPADM

## 2022-02-01 RX ORDER — NIFEDIPINE 60 MG/1
60 TABLET, EXTENDED RELEASE ORAL DAILY
Status: ON HOLD | COMMUNITY
End: 2022-02-03 | Stop reason: HOSPADM

## 2022-02-01 RX ORDER — POLYETHYLENE GLYCOL 3350 17 G/17G
17 POWDER, FOR SOLUTION ORAL DAILY
Status: DISCONTINUED | OUTPATIENT
Start: 2022-02-01 | End: 2022-02-03 | Stop reason: HOSPADM

## 2022-02-01 RX ORDER — CARVEDILOL 25 MG/1
50 TABLET ORAL 2 TIMES DAILY WITH MEALS
Status: DISCONTINUED | OUTPATIENT
Start: 2022-02-01 | End: 2022-02-03 | Stop reason: HOSPADM

## 2022-02-01 RX ORDER — ACETAMINOPHEN 325 MG/1
650 TABLET ORAL EVERY 8 HOURS PRN
Status: DISCONTINUED | OUTPATIENT
Start: 2022-02-01 | End: 2022-02-03 | Stop reason: HOSPADM

## 2022-02-01 RX ORDER — TALC
6 POWDER (GRAM) TOPICAL NIGHTLY PRN
Status: DISCONTINUED | OUTPATIENT
Start: 2022-02-01 | End: 2022-02-03 | Stop reason: HOSPADM

## 2022-02-01 RX ORDER — FUROSEMIDE 10 MG/ML
40 INJECTION INTRAMUSCULAR; INTRAVENOUS ONCE
Status: COMPLETED | OUTPATIENT
Start: 2022-02-01 | End: 2022-02-01

## 2022-02-01 RX ADMIN — CALCIUM GLUCONATE 1 G: 98 INJECTION, SOLUTION INTRAVENOUS at 01:02

## 2022-02-01 RX ADMIN — SODIUM ZIRCONIUM CYCLOSILICATE 10 G: 10 POWDER, FOR SUSPENSION ORAL at 06:02

## 2022-02-01 RX ADMIN — HYDRALAZINE HYDROCHLORIDE 100 MG: 25 TABLET, FILM COATED ORAL at 09:02

## 2022-02-01 RX ADMIN — INSULIN HUMAN 8 UNITS: 100 INJECTION, SOLUTION PARENTERAL at 10:02

## 2022-02-01 RX ADMIN — CALCIUM GLUCONATE 1000 MG: 98 INJECTION, SOLUTION INTRAVENOUS at 10:02

## 2022-02-01 RX ADMIN — FUROSEMIDE 40 MG: 10 INJECTION, SOLUTION INTRAVENOUS at 10:02

## 2022-02-01 RX ADMIN — CALCIUM GLUCONATE 1000 MG: 98 INJECTION, SOLUTION INTRAVENOUS at 06:02

## 2022-02-01 RX ADMIN — DEXTROSE MONOHYDRATE 25 G: 500 INJECTION PARENTERAL at 01:02

## 2022-02-01 RX ADMIN — ALBUTEROL SULFATE 10 MG: 2.5 SOLUTION RESPIRATORY (INHALATION) at 01:02

## 2022-02-01 RX ADMIN — INSULIN HUMAN 5 UNITS: 100 INJECTION, SOLUTION PARENTERAL at 01:02

## 2022-02-01 RX ADMIN — ALBUTEROL SULFATE 10 MG: 2.5 SOLUTION RESPIRATORY (INHALATION) at 07:02

## 2022-02-01 RX ADMIN — ENOXAPARIN SODIUM 40 MG: 100 INJECTION SUBCUTANEOUS at 06:02

## 2022-02-01 RX ADMIN — CARVEDILOL 50 MG: 25 TABLET, FILM COATED ORAL at 05:02

## 2022-02-01 RX ADMIN — SODIUM BICARBONATE 25 MEQ: 84 INJECTION, SOLUTION INTRAVENOUS at 03:02

## 2022-02-01 RX ADMIN — ALBUTEROL SULFATE 10 MG: 2.5 SOLUTION RESPIRATORY (INHALATION) at 10:02

## 2022-02-01 RX ADMIN — DEXTROSE MONOHYDRATE 25 G: 500 INJECTION PARENTERAL at 02:02

## 2022-02-01 RX ADMIN — Medication 6 MG: at 09:02

## 2022-02-01 RX ADMIN — SODIUM BICARBONATE: 84 INJECTION, SOLUTION INTRAVENOUS at 11:02

## 2022-02-01 RX ADMIN — DEXTROSE 250 ML: 10 SOLUTION INTRAVENOUS at 09:02

## 2022-02-01 RX ADMIN — DOXAZOSIN 4 MG: 2 TABLET ORAL at 09:02

## 2022-02-01 RX ADMIN — Medication 10 ML: at 10:02

## 2022-02-01 NOTE — CONSULTS
[place] - Emergency Dept  Cardiology  Consult Note    Patient Name: Domingo Gross  MRN: [id number]  Admission Date: 2/1/2022  Hospital Length of Stay: 0 days  Code Status: Prior   Attending Provider: David Early MD   Consulting Provider: Karlos Zimmerman NP  Primary Care Physician: Analy Encarnacion MD  Principal Problem:<principal problem not specified>    Patient information was obtained from patient, past medical records and ER records.     Inpatient consult to Cardiology  Consult performed by: Karlos Zimmerman NP  Consult ordered by: JEET Terrell        Subjective:     Chief Complaint:  Abnormal labs      HPI:   Domingo Gross is a 61 y.o. male who presents for follow up of Peripheral Arterial Disease, Coronary Artery Disease, Hyperlipidemia, Hypertension, Fatigue, Anemia, PAF in NSR, edema, and PAD with winsome IIb claudication. He is s/p bilateral SFA, GRUPO, EIA, and R CFA revascularization. Patient is scheduled for repeat revascularization on Friday. Patient had preop labs drawn today and his K+ to be 6.5 with Cr at baseline at 2.5.  He has seen nephrology and later recommended a renal biopsy but he was reasonably reluctant to stop DAPT because of his history of cardiac arrest. Patient denies any chest pain, syncope, palpitations, dizziness, edema, dizziness. HH stable at 10/35.        Past Medical History:   Diagnosis Date    Allergy     Anemia     Anticoagulant long-term use     Arthritis     CKD (chronic kidney disease) stage 4, GFR 15-29 ml/min     COPD (chronic obstructive pulmonary disease) 8/20/2021    Coronary artery disease     Heart attack 10/04/2019    Hematuria     Hemothorax     Hyperlipidemia     Hypertension     Hyperuricemia     Hypocalcemia     Hypokalemia     Hypophosphatemia     PAD (peripheral artery disease)     Proteinuria     Vitamin D deficiency        Past Surgical History:   Procedure Laterality Date    ABDOMINAL AORTOGRAPHY N/A  5/10/2019    Procedure: AORTOGRAM-ABDOMINAL;  Surgeon: Keo Jenkins MD;  Location: [facility] CATH LAB/EP;  Service: Cardiology;  Laterality: N/A;  RSFA intervention     AORTOGRAPHY WITH SERIALOGRAPHY N/A 12/3/2021    Procedure: AORTOGRAM, WITH SERIALOGRAPHY;  Surgeon: Keo Jenkins MD;  Location: [facility] CATH LAB/EP;  Service: Cardiology;  Laterality: N/A;    BONE MARROW BIOPSY Right 10/12/2021    Procedure: BIOPSY-BONE MARROW;  Surgeon: Naseem Woods MD;  Location: [facility] OR;  Service: Oncology;  Laterality: Right;    CARDIAC CATHETERIZATION      CATARACT EXTRACTION      CATARACT EXTRACTION W/  INTRAOCULAR LENS IMPLANT Right 6/8/2020    Procedure: EXTRACTION, CATARACT, WITH IOL INSERTION;  Surgeon: Tin Light MD;  Location: [facility] OR;  Service: Ophthalmology;  Laterality: Right;    CATARACT EXTRACTION W/  INTRAOCULAR LENS IMPLANT Left 7/2/2020    Procedure: EXTRACTION, CATARACT, WITH IOL INSERTION;  Surgeon: Tin Light MD;  Location: [facility];  Service: Ophthalmology;  Laterality: Left;    COLONOSCOPY N/A 1/6/2022    Procedure: COLONOSCOPY;  Surgeon: Kathe Penaloza MD;  Location: [facility] ENDO (2ND FLR);  Service: Endoscopy;  Laterality: N/A;  COVID test at [facility] on 1/3-GT  okay to hold Plavix for 5 days and aspirin per Dr. Becerra  2nd floor due toextensive cardiac history   instructions mailed and my [facility] portal -     CORONARY ANGIOGRAPHY N/A 3/29/2019    Procedure: ANGIOGRAM, CORONARY ARTERY;  Surgeon: Keo Jenkins MD;  Location: [facility] CATH LAB/EP;  Service: Cardiology;  Laterality: N/A;    CORONARY ANGIOGRAPHY Left 10/11/2019    Procedure: ANGIOGRAM, CORONARY ARTERY;  Surgeon: Keo Jenkins MD;  Location: [facility] CATH LAB/EP;  Service: Cardiology;  Laterality: Left;    CORONARY ANGIOPLASTY WITH STENT PLACEMENT  03/29/2019    mid and distal RCA    ESOPHAGOGASTRODUODENOSCOPY N/A 1/6/2022    Procedure: EGD (ESOPHAGOGASTRODUODENOSCOPY);  Surgeon: Kathe Penaloza MD;  Location: [facility] ENDO (2ND FLR);   Service: Endoscopy;  Laterality: N/A;    EYE SURGERY      LEFT HEART CATHETERIZATION N/A 3/29/2019    Procedure: Left heart cath;  Surgeon: Keo Jenkins MD;  Location: [facility] CATH LAB/EP;  Service: Cardiology;  Laterality: N/A;    LEFT HEART CATHETERIZATION Left 10/8/2019    Procedure: Left heart cath;  Surgeon: Keo Jenkins MD;  Location: [facility] CATH LAB/EP;  Service: Cardiology;  Laterality: Left;    PERCUTANEOUS TRANSLUMINAL ANGIOPLASTY (PTA) OF PERIPHERAL VESSEL N/A 7/12/2019    Procedure: PTA, PERIPHERAL VESSEL;  Surgeon: Keo Jenkins MD;  Location: [facility] CATH LAB/EP;  Service: Cardiology;  Laterality: N/A;       Review of patient's allergies indicates:   Allergen Reactions    Ace inhibitors Rash       No current facility-administered medications on file prior to encounter.     Current Outpatient Medications on File Prior to Encounter   Medication Sig    albuterol (PROVENTIL/VENTOLIN HFA) 90 mcg/actuation inhaler INHALE 2 PUFFS BY MOUTH EVERY 6 HOURS AS NEEDED FOR WHEEZING OR RESCUE    ASPIRIN (ASPIR-81 ORAL) Take 1 tablet by mouth.    atorvastatin (LIPITOR) 80 MG tablet Take 80 mg by mouth once daily.    carvediloL (COREG) 25 MG tablet Take 2 tablets (50 mg total) by mouth 2 (two) times daily with meals.    cholecalciferol, vitamin D3, 1,250 mcg (50,000 unit) capsule Take 1 capsule (50,000 Units total) by mouth once a week.    clopidogreL (PLAVIX) 75 mg tablet Take 1 tablet (75 mg total) by mouth once daily.    coenzyme Q10 (COQ-10) 100 mg capsule Take 1 capsule (100 mg total) by mouth once daily.    doxazosin (CARDURA) 4 MG tablet TAKE 1 TABLET(4 MG) BY MOUTH EVERY EVENING    eplerenone (INSPRA) 50 MG Tab Take 1 tablet (50 mg total) by mouth once daily.    flunisolide 25 mcg, 0.025%, (NASALIDE) 25 mcg (0.025 %) Spry 2 sprays by Nasal route 2 (two) times daily.    gabapentin (NEURONTIN) 300 MG capsule Take 1 capsule (300 mg total) by mouth 3 (three) times daily as needed (back pain).     hydrALAZINE (APRESOLINE) 100 MG tablet Take 1 tablet (100 mg total) by mouth 2 (two) times a day.    irbesartan (AVAPRO) 300 MG tablet Take 1 tablet (300 mg total) by mouth every evening.    NIFEdipine (PROCARDIA-XL) 60 MG (OSM) 24 hr tablet Take 1 tablet (60 mg total) by mouth once daily.    rosuvastatin (CRESTOR) 40 MG Tab Take 1 tablet (40 mg total) by mouth every evening.     Family History     Problem Relation (Age of Onset)    Aneurysm Mother    Cancer Father    Diabetes Sister    Heart disease Mother    Hypertension Mother, Sister    No Known Problems Brother, Son        Tobacco Use    Smoking status: Former Smoker     Types: Cigarettes     Quit date: 1999     Years since quittin.8    Smokeless tobacco: Never Used   Substance and Sexual Activity    Alcohol use: No     Alcohol/week: 0.0 standard drinks    Drug use: No    Sexual activity: Yes     Partners: Female     Review of Systems   Constitutional: Negative. Negative for diaphoresis, fever and malaise/fatigue.   HENT: Negative.    Eyes: Negative.    Cardiovascular: Positive for claudication. Negative for chest pain, irregular heartbeat, leg swelling, near-syncope, orthopnea, palpitations, paroxysmal nocturnal dyspnea and syncope.   Respiratory: Negative.  Negative for cough and shortness of breath.    Endocrine: Negative.    Hematologic/Lymphatic: Negative.    Skin: Negative.    Musculoskeletal: Negative.    Gastrointestinal: Negative.  Negative for nausea and vomiting.   Genitourinary: Negative.    Neurological: Negative for dizziness, focal weakness and weakness.   Psychiatric/Behavioral: Negative.    Allergic/Immunologic: Negative.      Objective:     Vital Signs (Most Recent):  Temp: 97.9 °F (36.6 °C) (22 1116)  Pulse: 64 (22 1318)  Resp: 13 (22 1318)  BP: (!) 101/53 (22 1116)  SpO2: 100 % (22 1318) Vital Signs (24h Range):  Temp:  [97.9 °F (36.6 °C)] 97.9 °F (36.6 °C)  Pulse:  [64-66] 64  Resp:  [13-20]  13  SpO2:  [100 %] 100 %  BP: (101)/(53) 101/53     Weight: 77.1 kg (170 lb)  Body mass index is 24.39 kg/m².    SpO2: 100 %  O2 Device (Oxygen Therapy): room air    No intake or output data in the 24 hours ending 02/01/22 1449    Lines/Drains/Airways     Peripheral Intravenous Line                 Peripheral IV - Single Lumen 02/01/22 1300 18 G Left;Posterior Forearm <1 day                Physical Exam  Constitutional:       General: He is not in acute distress.     Appearance: He is not diaphoretic.   HENT:      Head: Atraumatic.   Eyes:      General:         Right eye: No discharge.         Left eye: No discharge.   Cardiovascular:      Rate and Rhythm: Normal rate and regular rhythm.      Heart sounds: Murmur heard.       Pulmonary:      Effort: Pulmonary effort is normal.      Breath sounds: Normal breath sounds.   Abdominal:      General: Bowel sounds are normal.      Palpations: Abdomen is soft.   Musculoskeletal:      Right lower leg: No edema.      Left lower leg: No edema.   Skin:     General: Skin is warm and dry.      Capillary Refill: Capillary refill takes 2 to 3 seconds.   Neurological:      Mental Status: He is alert and oriented to person, place, and time.   Psychiatric:         Mood and Affect: Mood normal.         Behavior: Behavior normal.         Thought Content: Thought content normal.         Judgment: Judgment normal.         Significant Labs:   BMP:   Recent Labs   Lab 02/01/22  0901 02/01/22  1137   GLU 94 126*   * 135*   K 6.5* 6.4*   * 116*   CO2 15* 12*   BUN 34* 35*   CREATININE 2.4* 2.5*   CALCIUM 8.6* 8.3*   , CMP   Recent Labs   Lab 02/01/22  0901 02/01/22  1137   * 135*   K 6.5* 6.4*   * 116*   CO2 15* 12*   GLU 94 126*   BUN 34* 35*   CREATININE 2.4* 2.5*   CALCIUM 8.6* 8.3*   PROT  --  6.3   ALBUMIN  --  3.6   BILITOT  --  0.5   ALKPHOS  --  57   AST  --  14   ALT  --  11   ANIONGAP 5* 7*   ESTGFRAFRICA 33* 31*   EGFRNONAA 28* 27*   , CBC   Recent Labs  "  Lab 02/01/22  0901 02/01/22  0901 02/01/22  1137   WBC 4.63  --  4.01   HGB 9.8*  --  9.9*   HCT 30.4*   < > 31.0*   *  --  151    < > = values in this interval not displayed.   , INR No results for input(s): INR, PROTIME in the last 48 hours., Lipid Panel No results for input(s): CHOL, HDL, LDLCALC, TRIG, CHOLHDL in the last 48 hours., Troponin No results for input(s): TROPONINI in the last 48 hours. and All pertinent lab results from the last 24 hours have been reviewed.    Significant Imaging: Echocardiogram:   Transthoracic echo (TTE) complete (Cupid Only):   Results for orders placed or performed during the hospital encounter of 08/19/21   Echo   Result Value Ref Range    Ascending aorta 3.30 cm    STJ 3.16 cm    AV mean gradient 2 mmHg    Ao peak jakob 1.04 m/s    Ao VTI 22.81 cm    IVRT 91.34 msec    IVS 0.90 0.6 - 1.1 cm    LA size 3.77 cm    Left Atrium Major Axis 4.83 cm    Left Atrium Minor Axis 4.64 cm    LVIDd 5.53 3.5 - 6.0 cm    LVIDs 3.94 2.1 - 4.0 cm    LVOT diameter 2.20 cm    LVOT peak VTI 16.56 cm    Posterior Wall 0.86 0.6 - 1.1 cm    MV Peak A Jakob 0.68 m/s    E wave deceleration time 197.99 msec    MV Peak E Jakob 0.99 m/s    PV Peak D Jakob 0.41 m/s    PV Peak S Jakob 0.31 m/s    RA Major Axis 4.77 cm    RA Width 3.60 cm    RVDD 2.62 cm    TAPSE 2.09 cm    TR Max Jakob 2.40 m/s    TDI LATERAL 0.09 m/s    TDI SEPTAL 0.05 m/s    LA WIDTH 4.14 cm    Ao root annulus 3.62 cm    MV stenosis pressure 1/2 time 57.42 ms    LV Diastolic Volume 149.16 mL    LV Systolic Volume 67.57 mL    LVOT peak jakob 0.81 m/s    LA volume (mod) 40.97 cm3    MV "A" wave duration 11.99 msec    MV mean gradient 1 mmHg    MV peak gradient 3 mmHg    RV S' 10.88 cm/s    LV LATERAL E/E' RATIO 11.00 m/s    LV SEPTAL E/E' RATIO 19.80 m/s    FS 29 %    LA volume 62.79 cm3    LV mass 182.23 g    Left Ventricle Relative Wall Thickness 0.31 cm    AV valve area 2.76 cm2    AV Velocity Ratio 0.78     AV index (prosthetic) 0.73     MV " valve area p 1/2 method 3.83 cm2    E/A ratio 1.46     Mean e' 0.07 m/s    Pulm vein S/D ratio 0.76     LVOT area 3.8 cm2    LVOT stroke volume 62.92 cm3    AV peak gradient 4 mmHg    E/E' ratio 14.14 m/s    Triscuspid Valve Regurgitation Peak Gradient 23 mmHg    BSA 2.06 m2    LV Systolic Volume Index 33.1 mL/m2    LV Diastolic Volume Index 73.12 mL/m2    LA Volume Index 30.8 mL/m2    LV Mass Index 89 g/m2    LA Volume Index (Mod) 20.1 mL/m2    Right Atrial Pressure (from IVC) 3 mmHg    EF 55 %    TV rest pulmonary artery pressure 26 mmHg    Narrative    · Normal systolic function.  · The estimated ejection fraction is 55%.  · Indeterminate left ventricular diastolic function.  · Normal right ventricular size with normal right ventricular systolic   function.  · Mild-to-moderate mitral regurgitation.  · Moderate pulmonic regurgitation.  · Normal central venous pressure (3 mmHg).  · The estimated PA systolic pressure is 26 mmHg.        Assessment and Plan:     Hyperkalemia  K+ 6.4 today, asymptomatic without arrhythmias on tele  Hold ARB  Nephrology consult pending   Shifted in the ED  Would not be opposed to Lokelma while awaiting Nephrology     Anemia in stage 3b chronic kidney disease  HH stable at 10/31  Cr at baseline 2.5    Coronary artery disease involving native coronary artery of native heart without angina pectoris  S/p PCI of RCA, LAD, LCx  Last coronary intervention was in 2019  No reports of chest pain  Continue medical management with DAPT, statin, BB  Holding ARB given significant hyperkalemia     Atherosclerosis of native artery of both lower extremities with intermittent claudication  Planned for revascularization on Friday as outpatient- will proceed as planned and repeat labs on day of procedure. Patient does not need to be admitted for PAD  Continue medical management with asa, Plavix, statin    HTN (hypertension)  SBP 100s-120 in the ED  Continue BB, doxazosin, Nifedipine, Hydralazine  Holding  ARB as previously stated  If BP is uncontrolled- up titrate CCB or hydralazine         VTE Risk Mitigation (From admission, onward)    None          Thank you for your consult. I will follow-up with patient. Please contact us if you have any additional questions.    Karlos Zimmerman NP  Cardiology   [place] - Emergency Dept

## 2022-02-01 NOTE — HPI
Domingo Gross is a 61 y.o. male who presents for follow up of Peripheral Arterial Disease, Coronary Artery Disease, Hyperlipidemia, Hypertension, Fatigue, Anemia, PAF in NSR, edema, and PAD with winsome IIb claudication. He is s/p bilateral SFA, GRUPO, EIA, and R CFA revascularization. Patient is scheduled for repeat revascularization on Friday. Patient had preop labs drawn today and his K+ to be 6.5 with Cr at baseline at 2.5.  He has seen nephrology and later recommended a renal biopsy but he was reasonably reluctant to stop DAPT because of his history of cardiac arrest. Patient denies any chest pain, syncope, palpitations, dizziness, edema, dizziness. HH stable at 10/35.

## 2022-02-01 NOTE — FIRST PROVIDER EVALUATION
Emergency Department TeleTriage Encounter Note      CHIEF COMPLAINT    Chief Complaint   Patient presents with    Abnormal Lab     K+ 6.5 lab notified MD this am and was directed to come in. No cp or palpiations noted       VITAL SIGNS   Initial Vitals [02/01/22 1116]   BP Pulse Resp Temp SpO2   (!) 101/53 66 20 97.9 °F (36.6 °C) 100 %      MAP       --            ALLERGIES    Review of patient's allergies indicates:   Allergen Reactions    Ace inhibitors Rash       PROVIDER TRIAGE NOTE  Patient is a 61 year old male who presents with elevated potassium.  He had blood work performed yesterday because he was supposed to have cardiac stents placed later this week.  He was noted to have an elevated potassium and instructed to come to the emergency room.  He denies any complaints.    Initial orders will be placed and care will be transferred to an alternate provider when patient is roomed for a full evaluation. Any additional orders and the final disposition will be determined by that provider.        ORDERS  Labs Reviewed - No data to display    ED Orders (720h ago, onward)    Start Ordered     Status Ordering Provider    02/01/22 1122 02/01/22 1121  EKG 12-lead  Once         Ordered NALLELY MANCERA    02/01/22 1122 02/01/22 1121  CBC auto differential  STAT         Ordered NALLELY MANCERA    02/01/22 1122 02/01/22 1121  Comprehensive metabolic panel  STAT         Ordered NALLELY MANCERA            Virtual Visit Note: The provider triage portion of this emergency department evaluation and documentation was performed via [employer], a HIPAA-compliant telemedicine application, in concert with a tele-presenter in the room. A face to face patient evaluation with one of my colleagues will occur once the patient is placed in an emergency department room.      DISCLAIMER: This note was prepared with [employer] voice recognition transcription software. Garbled syntax, mangled pronouns, and  other bizarre constructions may be attributed to that software system.

## 2022-02-01 NOTE — HPI
60 y/o male with CAD, PAD, PAF, HTN, CKD who was sent to the ER for abnormal lab with potassium of 6.5 on blood work drawn this morning. Denies chest pain, SOB, N/V, diaphoresis and is essentially without complaints. Patient has h/o bilateral SFA, GRUPO, EIA, and R CFA revascularization and is scheduled for repeat revascularization on Friday. Preop labs with high potassium but with creatinine at his baseline at 2.5.  In his recent hospitalization in the past, Nephrology recommended a renal biopsy but patient was not interested at that time. Also, noted to have hyperkalemia in the past which was corrected after stopping potassium supplements. In the ER, repeat potassium at 6.4 and bicarb of 12. Treatment given with calcium gluconate, insulin/D50 and NEBS.

## 2022-02-01 NOTE — SUBJECTIVE & OBJECTIVE
Past Medical History:   Diagnosis Date    Allergy     Anemia     Anticoagulant long-term use     Arthritis     CKD (chronic kidney disease) stage 4, GFR 15-29 ml/min     COPD (chronic obstructive pulmonary disease) 8/20/2021    Coronary artery disease     Heart attack 10/04/2019    Hematuria     Hemothorax     Hyperlipidemia     Hypertension     Hyperuricemia     Hypocalcemia     Hypokalemia     Hypophosphatemia     PAD (peripheral artery disease)     Proteinuria     Vitamin D deficiency        Past Surgical History:   Procedure Laterality Date    ABDOMINAL AORTOGRAPHY N/A 5/10/2019    Procedure: AORTOGRAM-ABDOMINAL;  Surgeon: Keo Jenkins MD;  Location: [facility] CATH LAB/EP;  Service: Cardiology;  Laterality: N/A;  RSFA intervention     AORTOGRAPHY WITH SERIALOGRAPHY N/A 12/3/2021    Procedure: AORTOGRAM, WITH SERIALOGRAPHY;  Surgeon: Keo Jenkins MD;  Location: [facility] CATH LAB/EP;  Service: Cardiology;  Laterality: N/A;    BONE MARROW BIOPSY Right 10/12/2021    Procedure: BIOPSY-BONE MARROW;  Surgeon: Naseem Woods MD;  Location: [facility] OR;  Service: Oncology;  Laterality: Right;    CARDIAC CATHETERIZATION      CATARACT EXTRACTION      CATARACT EXTRACTION W/  INTRAOCULAR LENS IMPLANT Right 6/8/2020    Procedure: EXTRACTION, CATARACT, WITH IOL INSERTION;  Surgeon: Tin Light MD;  Location: [facility] OR;  Service: Ophthalmology;  Laterality: Right;    CATARACT EXTRACTION W/  INTRAOCULAR LENS IMPLANT Left 7/2/2020    Procedure: EXTRACTION, CATARACT, WITH IOL INSERTION;  Surgeon: Tin Light MD;  Location: [facility] OR;  Service: Ophthalmology;  Laterality: Left;    COLONOSCOPY N/A 1/6/2022    Procedure: COLONOSCOPY;  Surgeon: Kathe Penaloza MD;  [facility]  Service: Endoscopy;  Laterality: N/A;  COVID test at [facility] on 1/3-GT  okay to hold Plavix for 5 days and aspirin per Dr. Becerra  2nd floor due toextensive cardiac history   instructions mailed and my [facility]  portal -     CORONARY ANGIOGRAPHY N/A 3/29/2019    Procedure: ANGIOGRAM, CORONARY ARTERY;  Surgeon: Keo Jenkins MD;  Location: [facility] CATH LAB/EP;  Service: Cardiology;  Laterality: N/A;    CORONARY ANGIOGRAPHY Left 10/11/2019    Procedure: ANGIOGRAM, CORONARY ARTERY;  Surgeon: Keo Jenkins MD;  Location: [facility] CATH LAB/EP;  Service: Cardiology;  Laterality: Left;    CORONARY ANGIOPLASTY WITH STENT PLACEMENT  03/29/2019    mid and distal RCA    ESOPHAGOGASTRODUODENOSCOPY N/A 1/6/2022    Procedure: EGD (ESOPHAGOGASTRODUODENOSCOPY);  Surgeon: Kathe Penaloza MD;  Location: [facility] ENDO ([address] [place] [address]);  Service: Endoscopy;  Laterality: N/A;    EYE SURGERY      LEFT HEART CATHETERIZATION N/A 3/29/2019    Procedure: Left heart cath;  Surgeon: Keo Jenkins MD;  Location: [facility] CATH LAB/EP;  Service: Cardiology;  Laterality: N/A;    LEFT HEART CATHETERIZATION Left 10/8/2019    Procedure: Left heart cath;  Surgeon: Keo Jenkins MD;  Location: [facility] CATH LAB/EP;  Service: Cardiology;  Laterality: Left;    PERCUTANEOUS TRANSLUMINAL ANGIOPLASTY (PTA) OF PERIPHERAL VESSEL N/A 7/12/2019    Procedure: PTA, PERIPHERAL VESSEL;  Surgeon: Keo Jenkins MD;  Location: [facility] CATH LAB/EP;  Service: Cardiology;  Laterality: N/A;       Review of patient's allergies indicates:   Allergen Reactions    Ace inhibitors Rash       No current facility-administered medications on file prior to encounter.     Current Outpatient Medications on File Prior to Encounter   Medication Sig    ASPIRIN (ASPIR-81 ORAL) Take 81 mg by mouth once daily.    atorvastatin (LIPITOR) 80 MG tablet Take 80 mg by mouth once daily.    carvediloL (COREG) 25 MG tablet Take 2 tablets (50 mg total) by mouth 2 (two) times daily with meals.    clopidogreL (PLAVIX) 75 mg tablet Take 1 tablet (75 mg total) by mouth once daily.    coenzyme Q10 (COQ-10) 100 mg capsule Take 100 mg by mouth once daily.    doxazosin (CARDURA) 4 MG tablet TAKE 1 TABLET(4 MG) BY MOUTH  EVERY EVENING (Patient taking differently: Take 4 mg by mouth every evening.)    eplerenone (INSPRA) 50 MG Tab Take 1 tablet (50 mg total) by mouth once daily.    hydrALAZINE (APRESOLINE) 100 MG tablet Take 1 tablet (100 mg total) by mouth 2 (two) times a day.    irbesartan (AVAPRO) 300 MG tablet Take 1 tablet (300 mg total) by mouth every evening.    NIFEdipine (PROCARDIA-XL) 60 MG (OSM) 24 hr tablet Take 60 mg by mouth once daily.    rosuvastatin (CRESTOR) 40 MG Tab Take 1 tablet (40 mg total) by mouth every evening.    gabapentin (NEURONTIN) 300 MG capsule Take 1 capsule (300 mg total) by mouth 3 (three) times daily as needed (back pain). (Patient not taking: Reported on 2022)    [DISCONTINUED] albuterol (PROVENTIL/VENTOLIN HFA) 90 mcg/actuation inhaler INHALE 2 PUFFS BY MOUTH EVERY 6 HOURS AS NEEDED FOR WHEEZING OR RESCUE    [DISCONTINUED] cholecalciferol, vitamin D3, 1,250 mcg (50,000 unit) capsule Take 1 capsule (50,000 Units total) by mouth once a week.    [DISCONTINUED] coenzyme Q10 (COQ-10) 100 mg capsule Take 1 capsule (100 mg total) by mouth once daily.    [DISCONTINUED] flunisolide 25 mcg, 0.025%, (NASALIDE) 25 mcg (0.025 %) Spry 2 sprays by Nasal route 2 (two) times daily.    [DISCONTINUED] NIFEdipine (PROCARDIA-XL) 60 MG (OSM) 24 hr tablet Take 1 tablet (60 mg total) by mouth once daily.     Family History     Problem Relation (Age of Onset)    Aneurysm Mother    Cancer Father    Diabetes Sister    Heart disease Mother    Hypertension Mother, Sister    No Known Problems Brother, Son        Tobacco Use    Smoking status: Former Smoker     Types: Cigarettes     Quit date: 1999     Years since quittin.8    Smokeless tobacco: Never Used   Substance and Sexual Activity    Alcohol use: No     Alcohol/week: 0.0 standard drinks    Drug use: No    Sexual activity: Yes     Partners: Female     Review of Systems   Constitutional: Negative for chills and fever.   HENT: Negative for  sore throat.    Eyes: Negative for visual disturbance.   Respiratory: Negative for shortness of breath.    Cardiovascular: Negative for chest pain.   Gastrointestinal: Negative for abdominal pain, nausea and vomiting.   Endocrine: Negative for polyuria.   Genitourinary: Negative for dysuria.   Musculoskeletal: Negative for back pain.   Skin: Negative for rash.   Neurological: Negative for syncope.   Psychiatric/Behavioral: Negative for confusion.     Objective:     Vital Signs (Most Recent):  Temp: 97.9 °F (36.6 °C) (02/01/22 1116)  Pulse: 64 (02/01/22 1318)  Resp: 13 (02/01/22 1318)  BP: (!) 101/53 (02/01/22 1116)  SpO2: 100 % (02/01/22 1318) Vital Signs (24h Range):  Temp:  [97.9 °F (36.6 °C)] 97.9 °F (36.6 °C)  Pulse:  [64-66] 64  Resp:  [13-20] 13  SpO2:  [100 %] 100 %  BP: (101)/(53) 101/53     Weight: 77.1 kg (170 lb)  Body mass index is 24.39 kg/m².    Physical Exam  Constitutional:       General: He is not in acute distress.     Appearance: He is not ill-appearing, toxic-appearing or diaphoretic.   HENT:      Head: Normocephalic and atraumatic.      Nose: Nose normal.   Eyes:      Extraocular Movements: Extraocular movements intact.      Conjunctiva/sclera: Conjunctivae normal.   Cardiovascular:      Rate and Rhythm: Normal rate and regular rhythm.   Pulmonary:      Effort: Pulmonary effort is normal.      Breath sounds: Normal breath sounds.   Abdominal:      General: Bowel sounds are normal. There is no distension.      Palpations: Abdomen is soft.      Tenderness: There is no abdominal tenderness. There is no guarding or rebound.   Musculoskeletal:         General: Normal range of motion.      Cervical back: Normal range of motion and neck supple.   Skin:     General: Skin is warm and dry.   Neurological:      General: No focal deficit present.      Mental Status: He is alert.   Psychiatric:         Mood and Affect: Mood normal.         Behavior: Behavior normal.         Thought Content: Thought content  normal.         Judgment: Judgment normal.             Significant Labs: All pertinent labs within the past 24 hours have been reviewed.    Significant Imaging: I have reviewed all pertinent imaging results/findings within the past 24 hours.

## 2022-02-01 NOTE — TELEPHONE ENCOUNTER
Reached out to [facility] to arrange an appt for a P2P at 0813 this am Spoke with Nadine who reported the schedulers would call me at my desk phone to set time for P2P.    Reached back to [employer] at 2:25 and was informed the  is in a meeting but has my contact information and will call me as soon as the meeting is complete. Verified contact phone #

## 2022-02-01 NOTE — PHARMACY MED REC
"Admission Medication History     The home medication history was taken by Cherie Asencio CPhT.    Medication history obtained from,Patient verified    You may go to "Admission" then "Reconcile Home Medications" tabs to review and/or act upon these items.      The home medication list has been updated by the Pharmacy department.    Please read ALL comments highlighted in yellow.    Please address this information as you see fit.     Feel free to contact us if you have any questions or require assistance.      The medications listed below were removed from the home medication list.  Please reorder if appropriate:  Patient reports no longer taking the following medication(s):   d3 50,000unit   proventil hfa 90mcg   nasalide 25mcg nasal spray        Cherie Asencio CPhT.  Ext [phone]                  .          "

## 2022-02-01 NOTE — ED PROVIDER NOTES
Encounter Date: 2/1/2022       History     Chief Complaint   Patient presents with    Abnormal Lab     K+ 6.5 lab notified MD this am and was directed to come in. No cp or palpiations noted     60 y/o male with history of COPD, CAD, CKD, HTN, PAD, hypokalemia and hyperkalemia, anemia, presents to the ER with complaint of abnormal lab with potassium of 6.5 on blood work drawn this morning.  Patient is scheduled for cardiac stent placement in 3 days.  He had labs and covid testing for pre-op planning today and was referred to the ER due to hyperkalemia.  He had labs 2 weeks ago,which showed potassium of 5.5.    He denies chest pain or SOB, fever, cough, nausea, vomiting, diarrhea,  or additional complaints at this time.         Review of patient's allergies indicates:   Allergen Reactions    Ace inhibitors Rash     Past Medical History:   Diagnosis Date    Allergy     Anemia     Anticoagulant long-term use     Arthritis     CKD (chronic kidney disease) stage 4, GFR 15-29 ml/min     COPD (chronic obstructive pulmonary disease) 8/20/2021    Coronary artery disease     Heart attack 10/04/2019    Hematuria     Hemothorax     Hyperlipidemia     Hypertension     Hyperuricemia     Hypocalcemia     Hypokalemia     Hypophosphatemia     PAD (peripheral artery disease)     Proteinuria     Vitamin D deficiency      Past Surgical History:   Procedure Laterality Date    ABDOMINAL AORTOGRAPHY N/A 5/10/2019    Procedure: AORTOGRAM-ABDOMINAL;  Surgeon: Keo Jenkins MD;  Location: [facility] CATH LAB/EP;  Service: Cardiology;  Laterality: N/A;  RSFA intervention     AORTOGRAPHY WITH SERIALOGRAPHY N/A 12/3/2021    Procedure: AORTOGRAM, WITH SERIALOGRAPHY;  Surgeon: Keo Jenkins MD;  Location: [facility] CATH LAB/EP;  Service: Cardiology;  Laterality: N/A;    BONE MARROW BIOPSY Right 10/12/2021    Procedure: BIOPSY-BONE MARROW;  Surgeon: Naseem Woods MD;  Location: [facility] OR;  Service: Oncology;  Laterality: Right;     CARDIAC CATHETERIZATION      CATARACT EXTRACTION      CATARACT EXTRACTION W/  INTRAOCULAR LENS IMPLANT Right 6/8/2020    Procedure: EXTRACTION, CATARACT, WITH IOL INSERTION;  Surgeon: Tin Light MD;  Location: [facility] OR;  Service: Ophthalmology;  Laterality: Right;    CATARACT EXTRACTION W/  INTRAOCULAR LENS IMPLANT Left 7/2/2020    Procedure: EXTRACTION, CATARACT, WITH IOL INSERTION;  Surgeon: Tin Light MD;  Location: [facility] OR;  Service: Ophthalmology;  Laterality: Left;    COLONOSCOPY N/A 1/6/2022    Procedure: COLONOSCOPY;  Surgeon: Kathe Penaloza MD;  Location: [facility] (2ND FLR);  Service: Endoscopy;  Laterality: N/A;  COVID test at [facility] on 1/3-GT  okay to hold Plavix for 5 days and aspirin per Dr. Becerra  2nd floor due toextensive cardiac history   instructions mailed and my [facility] portal -     CORONARY ANGIOGRAPHY N/A 3/29/2019    Procedure: ANGIOGRAM, CORONARY ARTERY;  Surgeon: Keo Jenkins MD;  Location: [facility] CATH LAB/EP;  Service: Cardiology;  Laterality: N/A;    CORONARY ANGIOGRAPHY Left 10/11/2019    Procedure: ANGIOGRAM, CORONARY ARTERY;  Surgeon: Keo Jenkins MD;  Location: [facility] CATH LAB/EP;  Service: Cardiology;  Laterality: Left;    CORONARY ANGIOPLASTY WITH STENT PLACEMENT  03/29/2019    mid and distal RCA    ESOPHAGOGASTRODUODENOSCOPY N/A 1/6/2022    Procedure: EGD (ESOPHAGOGASTRODUODENOSCOPY);  Surgeon: Kathe Penaloza MD;  Location: [facility] (2ND FLR);  Service: Endoscopy;  Laterality: N/A;    EYE SURGERY      LEFT HEART CATHETERIZATION N/A 3/29/2019    Procedure: Left heart cath;  Surgeon: Keo Jenkins MD;  Location: [facility] CATH LAB/EP;  Service: Cardiology;  Laterality: N/A;    LEFT HEART CATHETERIZATION Left 10/8/2019    Procedure: Left heart cath;  Surgeon: Keo Jenkins MD;  Location: [facility] CATH LAB/EP;  Service: Cardiology;  Laterality: Left;    PERCUTANEOUS TRANSLUMINAL ANGIOPLASTY (PTA) OF PERIPHERAL VESSEL N/A 7/12/2019    Procedure: PTA, PERIPHERAL  VESSEL;  Surgeon: Keo Jenkins MD;  Location: [facility] CATH LAB/EP;  Service: Cardiology;  Laterality: N/A;     Family History   Problem Relation Age of Onset    Aneurysm Mother     Hypertension Mother     Heart disease Mother     Cancer Father     Diabetes Sister     Hypertension Sister     No Known Problems Brother     No Known Problems Son      Social History     Tobacco Use    Smoking status: Former Smoker     Types: Cigarettes     Quit date: 1999     Years since quittin.8    Smokeless tobacco: Never Used   Substance Use Topics    Alcohol use: No     Alcohol/week: 0.0 standard drinks    Drug use: No     Review of Systems   Constitutional: Negative for chills and fever.   HENT: Negative for sore throat.    Respiratory: Negative for shortness of breath.    Cardiovascular: Negative for chest pain.   Gastrointestinal: Negative for nausea.   Genitourinary: Negative for dysuria.   Musculoskeletal: Negative for back pain.   Skin: Negative for rash.   Neurological: Negative for dizziness, syncope, weakness, light-headedness and headaches.   Hematological: Does not bruise/bleed easily.       Physical Exam     Initial Vitals [22 1116]   BP Pulse Resp Temp SpO2   (!) 101/53 66 20 97.9 °F (36.6 °C) 100 %      MAP       --         Physical Exam    Vitals reviewed.  Constitutional: He appears well-developed and well-nourished.   HENT:   Head: Atraumatic.   Eyes: Conjunctivae and EOM are normal. Pupils are equal, round, and reactive to light.   Neck: Neck supple.   Normal range of motion.  Cardiovascular: Normal rate, regular rhythm and intact distal pulses.   Pulmonary/Chest: Breath sounds normal. No respiratory distress. He has no wheezes. He has no rhonchi. He has no rales.   Abdominal: Abdomen is soft. Bowel sounds are normal. There is no abdominal tenderness.   Musculoskeletal:      Cervical back: Normal range of motion and neck supple.     Neurological: He is alert and oriented to person,  place, and time. No cranial nerve deficit. GCS score is 15. GCS eye subscore is 4. GCS verbal subscore is 5. GCS motor subscore is 6.   Skin: No rash noted.   Psychiatric: He has a normal mood and affect.         ED Course   Procedures  Labs Reviewed   CBC W/ AUTO DIFFERENTIAL - Abnormal; Notable for the following components:       Result Value    RBC 3.44 (*)     Hemoglobin 9.9 (*)     Hematocrit 31.0 (*)     MCHC 31.9 (*)     Lymph # 0.7 (*)     Lymph % 17.5 (*)     All other components within normal limits   COMPREHENSIVE METABOLIC PANEL - Abnormal; Notable for the following components:    Sodium 135 (*)     Potassium 6.4 (*)     Chloride 116 (*)     CO2 12 (*)     Glucose 126 (*)     BUN 35 (*)     Creatinine 2.5 (*)     Calcium 8.3 (*)     Anion Gap 7 (*)     eGFR if  31 (*)     eGFR if non  27 (*)     All other components within normal limits    Narrative:       Potassium critical result(s) called and verbal readback obtained   from ENRIQUETA Guajardo by [facility] 02/01/2022 12:23   POCT GLUCOSE - Abnormal; Notable for the following components:    POCT Glucose 55 (*)     All other components within normal limits   POCT GLUCOSE - Abnormal; Notable for the following components:    POCT Glucose 29 (*)     All other components within normal limits   POCT GLUCOSE - Abnormal; Notable for the following components:    POCT Glucose 136 (*)     All other components within normal limits   SARS-COV-2 RDRP GENE   POCT GLUCOSE   POCT GLUCOSE   POCT GLUCOSE   POCT GLUCOSE MONITORING CONTINUOUS   POCT GLUCOSE MONITORING CONTINUOUS        ECG Results          EKG 12-lead (In process)  Result time 02/01/22 16:32:07    In process by Interface, Lab In [facility] (02/01/22 16:32:07)                 Narrative:    Test Reason : E87.5,    Vent. Rate : 066 BPM     Atrial Rate : 066 BPM     P-R Int : 134 ms          QRS Dur : 086 ms      QT Int : 366 ms       P-R-T Axes : 018 012 020 degrees     QTc Int : 383 ms    Normal  sinus rhythm  Anterior infarct ,age undetermined  Abnormal ECG  When compared with ECG of 03-DEC-2021 07:29,  Premature atrial complexes are no longer Present  Anterior infarct is now Present  Non-specific change in ST segment in Inferior leads  Nonspecific T wave abnormality now evident in Inferior leads    Referred By: [email or web address]   SELF           Confirmed By:                             Imaging Results    None          Medications   aspirin EC tablet 81 mg (has no administration in time range)   atorvastatin tablet 80 mg (has no administration in time range)   carvediloL tablet 50 mg (has no administration in time range)   clopidogreL tablet 75 mg (has no administration in time range)   doxazosin tablet 4 mg (has no administration in time range)   gabapentin capsule 300 mg (has no administration in time range)   hydrALAZINE tablet 100 mg (has no administration in time range)   NIFEdipine 24 hr tablet 60 mg (has no administration in time range)   sodium chloride 0.9% flush 10 mL (has no administration in time range)   polyethylene glycol packet 17 g (has no administration in time range)   glucose chewable tablet 16 g (has no administration in time range)   glucose chewable tablet 24 g (has no administration in time range)   dextrose 50% injection 12.5 g (has no administration in time range)   dextrose 50% injection 25 g (has no administration in time range)   glucagon (human recombinant) injection 1 mg (has no administration in time range)   acetaminophen tablet 650 mg (has no administration in time range)   melatonin tablet 6 mg (has no administration in time range)   naloxone 0.4 mg/mL injection 0.02 mg (has no administration in time range)   enoxaparin injection 40 mg (has no administration in time range)   ondansetron injection 4 mg (has no administration in time range)   insulin aspart U-100 pen 0-5 Units (has no administration in time range)   dextrose 50% injection 25 g (25 g Intravenous Given 2/1/22 6406)    calcium gluconate 1g in dextrose 5% 100mL (ready to mix system) (0 g Intravenous Stopped 2/1/22 1405)   albuterol sulfate nebulizer solution 10 mg (10 mg Nebulization Given 2/1/22 1318)   insulin regular injection 5 Units (5 Units Intravenous Given 2/1/22 1326)   sodium bicarbonate 8.4 % (1 mEq/mL) injection 25 mEq (25 mEq Intravenous Given 2/1/22 1553)   dextrose 50 % in water (D50W) injection 25 g (25 g Intravenous Given 2/1/22 1435)           APC / Resident Notes:   Patient presents to the ER due to hyperkalemia discovered on preop labs today.  K was 6.5.  He has CKD, not on dialysis.  He is scheduled for cardiac stent placement in 3 days.  He has no chest pain, SOB or palpitations.    Labs repeat by teletriage provider - K is 6.4.   No significant EKG changes. I discussed the care of this patient with my supervising MD.  Will give Calcium gluconate, albuterol, insulin with dextrose, continue cardiac monitoring and reassess.    Patients glucose decreased to 55, given juice with repeat of 29.  Asymptomatic, given additional amp of glucose.  Repeat glucose 71, then 136.  He is eating a meal in the ED.    Patient will require admission for continued treatment/evaluation of hyperkalemia. He has baseline Creatinine 2.5, BUN 35, CO2 16. I discussed patient Presentation and admission with Dr. Weston with hospital medicine.  She has agreed to admit the patient to IM, she has advised Bicarb admin.   Dr. Weston with consult/discuss with nephrology for evaluation during admission.  I have discussed with Cardiology on call due to plan for cardiac stents this week.  Cardiology is familiar with this patient, she is aware patient is in the ER and will evaluate and give recs.  Patient comfortable with plan for admission.  Repeat labs pending at time of admission.  Patient afebrile, stable vital signs.         ED Course as of 02/01/22 1751   Tue Feb 01, 2022   1424 I spoke with cardiology, they will evaluate the patient during  admission - consult order placed [LH]      ED Course User Index  [LH] JEET Terrell             Clinical Impression:   Final diagnoses:  [E87.5] Hyperkalemia (Primary)          ED Disposition Condition    Observation               JEET Terrell  02/01/22 [id number]

## 2022-02-01 NOTE — ASSESSMENT & PLAN NOTE
K+ 6.4 today, asymptomatic without arrhythmias on tele  Hold ARB  Nephrology consult pending   Shifted in the ED  Would not be opposed to [facility] while awaiting Nephrology

## 2022-02-01 NOTE — TELEPHONE ENCOUNTER
Received call from [employer]    [facility] arranged tomorrow 2/2 at 0856    Dr Jenkins's cell # provided     Dr Hartmann will contact Dr Jenkins from an [phone] #

## 2022-02-01 NOTE — SUBJECTIVE & OBJECTIVE
Past Medical History:   Diagnosis Date    Allergy     Anemia     Anticoagulant long-term use     Arthritis     CKD (chronic kidney disease) stage 4, GFR 15-29 ml/min     COPD (chronic obstructive pulmonary disease) 8/20/2021    Coronary artery disease     Heart attack 10/04/2019    Hematuria     Hemothorax     Hyperlipidemia     Hypertension     Hyperuricemia     Hypocalcemia     Hypokalemia     Hypophosphatemia     PAD (peripheral artery disease)     Proteinuria     Vitamin D deficiency        Past Surgical History:   Procedure Laterality Date    ABDOMINAL AORTOGRAPHY N/A 5/10/2019    Procedure: AORTOGRAM-ABDOMINAL;  Surgeon: Keo Jenkins MD;  Location: [facility] CATH LAB/EP;  Service: Cardiology;  Laterality: N/A;  RSFA intervention     AORTOGRAPHY WITH SERIALOGRAPHY N/A 12/3/2021    Procedure: AORTOGRAM, WITH SERIALOGRAPHY;  Surgeon: Keo Jenkins MD;  Location: [facility] CATH LAB/EP;  Service: Cardiology;  Laterality: N/A;    BONE MARROW BIOPSY Right 10/12/2021    Procedure: BIOPSY-BONE MARROW;  Surgeon: Naseem Woods MD;  Location: [facility] OR;  Service: Oncology;  Laterality: Right;    CARDIAC CATHETERIZATION      CATARACT EXTRACTION      CATARACT EXTRACTION W/  INTRAOCULAR LENS IMPLANT Right 6/8/2020    Procedure: EXTRACTION, CATARACT, WITH IOL INSERTION;  Surgeon: Tin Light MD;  Location: [facility] OR;  Service: Ophthalmology;  Laterality: Right;    CATARACT EXTRACTION W/  INTRAOCULAR LENS IMPLANT Left 7/2/2020    Procedure: EXTRACTION, CATARACT, WITH IOL INSERTION;  Surgeon: Tin Light MD;  Location: [facility] OR;  Service: Ophthalmology;  Laterality: Left;    COLONOSCOPY N/A 1/6/2022    Procedure: COLONOSCOPY;  Surgeon: Kathe Penaloza MD;  [facility]  Service: Endoscopy;  Laterality: N/A;  COVID test at [facility] on 1/3-GT  okay to hold Plavix for 5 days and aspirin per Dr. Becerra  2nd floor due toextensive cardiac history   instructions mailed and my [facility]  [place] -     CORONARY ANGIOGRAPHY N/A 3/29/2019    Procedure: ANGIOGRAM, CORONARY ARTERY;  Surgeon: Keo Jenkins MD;  Location: [facility] CATH LAB/EP;  Service: Cardiology;  Laterality: N/A;    CORONARY ANGIOGRAPHY Left 10/11/2019    Procedure: ANGIOGRAM, CORONARY ARTERY;  Surgeon: Keo Jenkins MD;  Location: [facility] CATH LAB/EP;  Service: Cardiology;  Laterality: Left;    CORONARY ANGIOPLASTY WITH STENT PLACEMENT  03/29/2019    mid and distal RCA    ESOPHAGOGASTRODUODENOSCOPY N/A 1/6/2022    Procedure: EGD (ESOPHAGOGASTRODUODENOSCOPY);  Surgeon: Kathe Penaloza MD;  Location: [facility] ([address] [place] [address]);  Service: Endoscopy;  Laterality: N/A;    EYE SURGERY      LEFT HEART CATHETERIZATION N/A 3/29/2019    Procedure: Left heart cath;  Surgeon: Keo Jenkins MD;  Location: [facility] CATH LAB/EP;  Service: Cardiology;  Laterality: N/A;    LEFT HEART CATHETERIZATION Left 10/8/2019    Procedure: Left heart cath;  Surgeon: Keo Jenkins MD;  Location: [facility] CATH LAB/EP;  Service: Cardiology;  Laterality: Left;    PERCUTANEOUS TRANSLUMINAL ANGIOPLASTY (PTA) OF PERIPHERAL VESSEL N/A 7/12/2019    Procedure: PTA, PERIPHERAL VESSEL;  Surgeon: Keo Jenkins MD;  Location: [facility] CATH LAB/EP;  Service: Cardiology;  Laterality: N/A;       Review of patient's allergies indicates:   Allergen Reactions    Ace inhibitors Rash       No current facility-administered medications on file prior to encounter.     Current Outpatient Medications on File Prior to Encounter   Medication Sig    albuterol (PROVENTIL/VENTOLIN HFA) 90 mcg/actuation inhaler INHALE 2 PUFFS BY MOUTH EVERY 6 HOURS AS NEEDED FOR WHEEZING OR RESCUE    ASPIRIN (ASPIR-81 ORAL) Take 1 tablet by mouth.    atorvastatin (LIPITOR) 80 MG tablet Take 80 mg by mouth once daily.    carvediloL (COREG) 25 MG tablet Take 2 tablets (50 mg total) by mouth 2 (two) times daily with meals.    cholecalciferol, vitamin D3, 1,250 mcg (50,000 unit) capsule Take 1 capsule (50,000 Units  total) by mouth once a week.    clopidogreL (PLAVIX) 75 mg tablet Take 1 tablet (75 mg total) by mouth once daily.    coenzyme Q10 (COQ-10) 100 mg capsule Take 1 capsule (100 mg total) by mouth once daily.    doxazosin (CARDURA) 4 MG tablet TAKE 1 TABLET(4 MG) BY MOUTH EVERY EVENING    eplerenone (INSPRA) 50 MG Tab Take 1 tablet (50 mg total) by mouth once daily.    flunisolide 25 mcg, 0.025%, (NASALIDE) 25 mcg (0.025 %) Spry 2 sprays by Nasal route 2 (two) times daily.    gabapentin (NEURONTIN) 300 MG capsule Take 1 capsule (300 mg total) by mouth 3 (three) times daily as needed (back pain).    hydrALAZINE (APRESOLINE) 100 MG tablet Take 1 tablet (100 mg total) by mouth 2 (two) times a day.    irbesartan (AVAPRO) 300 MG tablet Take 1 tablet (300 mg total) by mouth every evening.    NIFEdipine (PROCARDIA-XL) 60 MG (OSM) 24 hr tablet Take 1 tablet (60 mg total) by mouth once daily.    rosuvastatin (CRESTOR) 40 MG Tab Take 1 tablet (40 mg total) by mouth every evening.     Family History     Problem Relation (Age of Onset)    Aneurysm Mother    Cancer Father    Diabetes Sister    Heart disease Mother    Hypertension Mother, Sister    No Known Problems Brother, Son        Tobacco Use    Smoking status: Former Smoker     Types: Cigarettes     Quit date: 1999     Years since quittin.8    Smokeless tobacco: Never Used   Substance and Sexual Activity    Alcohol use: No     Alcohol/week: 0.0 standard drinks    Drug use: No    Sexual activity: Yes     Partners: Female     Review of Systems   Constitutional: Negative. Negative for diaphoresis, fever and malaise/fatigue.   HENT: Negative.    Eyes: Negative.    Cardiovascular: Positive for claudication. Negative for chest pain, irregular heartbeat, leg swelling, near-syncope, orthopnea, palpitations, paroxysmal nocturnal dyspnea and syncope.   Respiratory: Negative.  Negative for cough and shortness of breath.    Endocrine: Negative.     Hematologic/Lymphatic: Negative.    Skin: Negative.    Musculoskeletal: Negative.    Gastrointestinal: Negative.  Negative for nausea and vomiting.   Genitourinary: Negative.    Neurological: Negative for dizziness, focal weakness and weakness.   Psychiatric/Behavioral: Negative.    Allergic/Immunologic: Negative.      Objective:     Vital Signs (Most Recent):  Temp: 97.9 °F (36.6 °C) (02/01/22 1116)  Pulse: 64 (02/01/22 1318)  Resp: 13 (02/01/22 1318)  BP: (!) 101/53 (02/01/22 1116)  SpO2: 100 % (02/01/22 1318) Vital Signs (24h Range):  Temp:  [97.9 °F (36.6 °C)] 97.9 °F (36.6 °C)  Pulse:  [64-66] 64  Resp:  [13-20] 13  SpO2:  [100 %] 100 %  BP: (101)/(53) 101/53     Weight: 77.1 kg (170 lb)  Body mass index is 24.39 kg/m².    SpO2: 100 %  O2 Device (Oxygen Therapy): room air    No intake or output data in the 24 hours ending 02/01/22 1449    Lines/Drains/Airways     Peripheral Intravenous Line                 Peripheral IV - Single Lumen 02/01/22 1300 18 G Left;Posterior Forearm <1 day                Physical Exam  Constitutional:       General: He is not in acute distress.     Appearance: He is not diaphoretic.   HENT:      Head: Atraumatic.   Eyes:      General:         Right eye: No discharge.         Left eye: No discharge.   Cardiovascular:      Rate and Rhythm: Normal rate and regular rhythm.      Heart sounds: Murmur heard.       Pulmonary:      Effort: Pulmonary effort is normal.      Breath sounds: Normal breath sounds.   Abdominal:      General: Bowel sounds are normal.      Palpations: Abdomen is soft.   Musculoskeletal:      Right lower leg: No edema.      Left lower leg: No edema.   Skin:     General: Skin is warm and dry.      Capillary Refill: Capillary refill takes 2 to 3 seconds.   Neurological:      Mental Status: He is alert and oriented to person, place, and time.   Psychiatric:         Mood and Affect: Mood normal.         Behavior: Behavior normal.         Thought Content: Thought content  normal.         Judgment: Judgment normal.         Significant Labs:   BMP:   Recent Labs   Lab 02/01/22  0901 02/01/22  1137   GLU 94 126*   * 135*   K 6.5* 6.4*   * 116*   CO2 15* 12*   BUN 34* 35*   CREATININE 2.4* 2.5*   CALCIUM 8.6* 8.3*   , CMP   Recent Labs   Lab 02/01/22  0901 02/01/22  1137   * 135*   K 6.5* 6.4*   * 116*   CO2 15* 12*   GLU 94 126*   BUN 34* 35*   CREATININE 2.4* 2.5*   CALCIUM 8.6* 8.3*   PROT  --  6.3   ALBUMIN  --  3.6   BILITOT  --  0.5   ALKPHOS  --  57   AST  --  14   ALT  --  11   ANIONGAP 5* 7*   ESTGFRAFRICA 33* 31*   EGFRNONAA 28* 27*   , CBC   Recent Labs   Lab 02/01/22  0901 02/01/22  0901 02/01/22  1137   WBC 4.63  --  4.01   HGB 9.8*  --  9.9*   HCT 30.4*   < > 31.0*   *  --  151    < > = values in this interval not displayed.   , INR No results for input(s): INR, PROTIME in the last 48 hours., Lipid Panel No results for input(s): CHOL, HDL, LDLCALC, TRIG, CHOLHDL in the last 48 hours., Troponin No results for input(s): TROPONINI in the last 48 hours. and All pertinent lab results from the last 24 hours have been reviewed.    Significant Imaging: Echocardiogram:   Transthoracic echo (TTE) complete (Cupid Only):   Results for orders placed or performed during the hospital encounter of 08/19/21   Echo   Result Value Ref Range    Ascending aorta 3.30 cm    STJ 3.16 cm    AV mean gradient 2 mmHg    Ao peak jakob 1.04 m/s    Ao VTI 22.81 cm    IVRT 91.34 msec    IVS 0.90 0.6 - 1.1 cm    LA size 3.77 cm    Left Atrium Major Axis 4.83 cm    Left Atrium Minor Axis 4.64 cm    LVIDd 5.53 3.5 - 6.0 cm    LVIDs 3.94 2.1 - 4.0 cm    LVOT diameter 2.20 cm    LVOT peak VTI 16.56 cm    Posterior Wall 0.86 0.6 - 1.1 cm    MV Peak A Jakob 0.68 m/s    E wave deceleration time 197.99 msec    MV Peak E Jakob 0.99 m/s    PV Peak D Jakob 0.41 m/s    PV Peak S Jakob 0.31 m/s    RA Major Axis 4.77 cm    RA Width 3.60 cm    RVDD 2.62 cm    TAPSE 2.09 cm    TR Max Jakob 2.40 m/s     "TDI LATERAL 0.09 m/s    TDI SEPTAL 0.05 m/s    LA WIDTH 4.14 cm    Ao root annulus 3.62 cm    MV stenosis pressure 1/2 time 57.42 ms    LV Diastolic Volume 149.16 mL    LV Systolic Volume 67.57 mL    LVOT peak nabil 0.81 m/s    LA volume (mod) 40.97 cm3    MV "A" wave duration 11.99 msec    MV mean gradient 1 mmHg    MV peak gradient 3 mmHg    RV S' 10.88 cm/s    LV LATERAL E/E' RATIO 11.00 m/s    LV SEPTAL E/E' RATIO 19.80 m/s    FS 29 %    LA volume 62.79 cm3    LV mass 182.23 g    Left Ventricle Relative Wall Thickness 0.31 cm    AV valve area 2.76 cm2    AV Velocity Ratio 0.78     AV index (prosthetic) 0.73     MV valve area p 1/2 method 3.83 cm2    E/A ratio 1.46     Mean e' 0.07 m/s    Pulm vein S/D ratio 0.76     LVOT area 3.8 cm2    LVOT stroke volume 62.92 cm3    AV peak gradient 4 mmHg    E/E' ratio 14.14 m/s    Triscuspid Valve Regurgitation Peak Gradient 23 mmHg    BSA 2.06 m2    LV Systolic Volume Index 33.1 mL/m2    LV Diastolic Volume Index 73.12 mL/m2    LA Volume Index 30.8 mL/m2    LV Mass Index 89 g/m2    LA Volume Index (Mod) 20.1 mL/m2    Right Atrial Pressure (from IVC) 3 mmHg    EF 55 %    TV rest pulmonary artery pressure 26 mmHg    Narrative    · Normal systolic function.  · The estimated ejection fraction is 55%.  · Indeterminate left ventricular diastolic function.  · Normal right ventricular size with normal right ventricular systolic   function.  · Mild-to-moderate mitral regurgitation.  · Moderate pulmonic regurgitation.  · Normal central venous pressure (3 mmHg).  · The estimated PA systolic pressure is 26 mmHg.        "

## 2022-02-01 NOTE — H&P
[facility] Emergency [facility] Medicine  History & Physical    Patient Name: Domingo Gross  MRN: [id number]  Patient Class: OP- Observation  Admission Date: 2/1/2022  Attending Physician: Kyra Weston MD   Primary Care Provider: Analy Encarnacion MD         Patient information was obtained from patient, law enforcement and ER records.     Subjective:     Principal Problem:Hyperkalemia    Chief Complaint:   Chief Complaint   Patient presents with    Abnormal Lab     K+ 6.5 lab notified MD this am and was directed to come in. No cp or palpiations noted        HPI: 60 y/o male with CAD, PAD, PAF, HTN, CKD who was sent to the ER for abnormal lab with potassium of 6.5 on blood work drawn this morning. Denies chest pain, SOB, N/V, diaphoresis and is essentially without complaints. Patient has h/o bilateral SFA, GRUPO, EIA, and R CFA revascularization and is scheduled for repeat revascularization on Friday. Preop labs with high potassium but with creatinine at his baseline at 2.5.  In his recent hospitalization in the past, Nephrology recommended a renal biopsy but patient was not interested at that time. Also, noted to have hyperkalemia in the past which was corrected after stopping potassium supplements. In the ER, repeat potassium at 6.4 and bicarb of 12. Treatment given with calcium gluconate, insulin/D50 and NEBS.      Past Medical History:   Diagnosis Date    Allergy     Anemia     Anticoagulant long-term use     Arthritis     CKD (chronic kidney disease) stage 4, GFR 15-29 ml/min     COPD (chronic obstructive pulmonary disease) 8/20/2021    Coronary artery disease     Heart attack 10/04/2019    Hematuria     Hemothorax     Hyperlipidemia     Hypertension     Hyperuricemia     Hypocalcemia     Hypokalemia     Hypophosphatemia     PAD (peripheral artery disease)     Proteinuria     Vitamin D deficiency        Past Surgical History:   Procedure Laterality Date    ABDOMINAL AORTOGRAPHY N/A  5/10/2019    Procedure: AORTOGRAM-ABDOMINAL;  Surgeon: Keo Jenkins MD;  Location: [facility] CATH LAB/EP;  Service: Cardiology;  Laterality: N/A;  RSFA intervention     AORTOGRAPHY WITH SERIALOGRAPHY N/A 12/3/2021    Procedure: AORTOGRAM, WITH SERIALOGRAPHY;  Surgeon: Keo Jenkins MD;  Location: [facility] CATH LAB/EP;  Service: Cardiology;  Laterality: N/A;    BONE MARROW BIOPSY Right 10/12/2021    Procedure: BIOPSY-BONE MARROW;  Surgeon: Naseem Woods MD;  Location: [facility] OR;  Service: Oncology;  Laterality: Right;    CARDIAC CATHETERIZATION      CATARACT EXTRACTION      CATARACT EXTRACTION W/  INTRAOCULAR LENS IMPLANT Right 6/8/2020    Procedure: EXTRACTION, CATARACT, WITH IOL INSERTION;  Surgeon: Tin Light MD;  Location[facility];  Service: Ophthalmology;  Laterality: Right;    CATARACT EXTRACTION W/  INTRAOCULAR LENS IMPLANT Left 7/2/2020    Procedure: EXTRACTION, CATARACT, WITH IOL INSERTION;  Surgeon: Tin Light MD;  Location: [facility];  Service: Ophthalmology;  Laterality: Left;    COLONOSCOPY N/A 1/6/2022    Procedure: COLONOSCOPY;  Surgeon: Kathe Penaloza MD;  Location: [facility] ENDO (2ND FLR);  Service: Endoscopy;  Laterality: N/A;  COVID test at [facility] on 1/3-GT  okay to hold Plavix for 5 days and aspirin per Dr. Becerra  2nd floor due toextensive cardiac history   instructions mailed and my [facility] -     CORONARY ANGIOGRAPHY N/A 3/29/2019    Procedure: ANGIOGRAM, CORONARY ARTERY;  Surgeon: Keo Jenkins MD;  Location: [facility] CATH LAB/EP;  Service: Cardiology;  Laterality: N/A;    CORONARY ANGIOGRAPHY Left 10/11/2019    Procedure: ANGIOGRAM, CORONARY ARTERY;  Surgeon: Keo Jenkins MD;  Location: [facility] CATH LAB/EP;  Service: Cardiology;  Laterality: Left;    CORONARY ANGIOPLASTY WITH STENT PLACEMENT  03/29/2019    mid and distal RCA    ESOPHAGOGASTRODUODENOSCOPY N/A 1/6/2022    Procedure: EGD (ESOPHAGOGASTRODUODENOSCOPY);  Surgeon: Kathe Penaloza MD;  Location: [facility] ENDO (2ND FLR);   Service: Endoscopy;  Laterality: N/A;    EYE SURGERY      LEFT HEART CATHETERIZATION N/A 3/29/2019    Procedure: Left heart cath;  Surgeon: Keo Jenkins MD;  Location: [facility] CATH LAB/EP;  Service: Cardiology;  Laterality: N/A;    LEFT HEART CATHETERIZATION Left 10/8/2019    Procedure: Left heart cath;  Surgeon: Keo Jenkins MD;  Location: [facility] CATH LAB/EP;  Service: Cardiology;  Laterality: Left;    PERCUTANEOUS TRANSLUMINAL ANGIOPLASTY (PTA) OF PERIPHERAL VESSEL N/A 7/12/2019    Procedure: PTA, PERIPHERAL VESSEL;  Surgeon: Keo Jenkins MD;  Location: [facility] CATH LAB/EP;  Service: Cardiology;  Laterality: N/A;       Review of patient's allergies indicates:   Allergen Reactions    Ace inhibitors Rash       No current facility-administered medications on file prior to encounter.     Current Outpatient Medications on File Prior to Encounter   Medication Sig    ASPIRIN (ASPIR-81 ORAL) Take 81 mg by mouth once daily.    atorvastatin (LIPITOR) 80 MG tablet Take 80 mg by mouth once daily.    carvediloL (COREG) 25 MG tablet Take 2 tablets (50 mg total) by mouth 2 (two) times daily with meals.    clopidogreL (PLAVIX) 75 mg tablet Take 1 tablet (75 mg total) by mouth once daily.    coenzyme Q10 (COQ-10) 100 mg capsule Take 100 mg by mouth once daily.    doxazosin (CARDURA) 4 MG tablet TAKE 1 TABLET(4 MG) BY MOUTH EVERY EVENING (Patient taking differently: Take 4 mg by mouth every evening.)    eplerenone (INSPRA) 50 MG Tab Take 1 tablet (50 mg total) by mouth once daily.    hydrALAZINE (APRESOLINE) 100 MG tablet Take 1 tablet (100 mg total) by mouth 2 (two) times a day.    irbesartan (AVAPRO) 300 MG tablet Take 1 tablet (300 mg total) by mouth every evening.    NIFEdipine (PROCARDIA-XL) 60 MG (OSM) 24 hr tablet Take 60 mg by mouth once daily.    rosuvastatin (CRESTOR) 40 MG Tab Take 1 tablet (40 mg total) by mouth every evening.    gabapentin (NEURONTIN) 300 MG capsule Take 1 capsule (300 mg total) by  mouth 3 (three) times daily as needed (back pain). (Patient not taking: Reported on 2022)    [DISCONTINUED] albuterol (PROVENTIL/VENTOLIN HFA) 90 mcg/actuation inhaler INHALE 2 PUFFS BY MOUTH EVERY 6 HOURS AS NEEDED FOR WHEEZING OR RESCUE    [DISCONTINUED] cholecalciferol, vitamin D3, 1,250 mcg (50,000 unit) capsule Take 1 capsule (50,000 Units total) by mouth once a week.    [DISCONTINUED] coenzyme Q10 (COQ-10) 100 mg capsule Take 1 capsule (100 mg total) by mouth once daily.    [DISCONTINUED] flunisolide 25 mcg, 0.025%, (NASALIDE) 25 mcg (0.025 %) Spry 2 sprays by Nasal route 2 (two) times daily.    [DISCONTINUED] NIFEdipine (PROCARDIA-XL) 60 MG (OSM) 24 hr tablet Take 1 tablet (60 mg total) by mouth once daily.     Family History     Problem Relation (Age of Onset)    Aneurysm Mother    Cancer Father    Diabetes Sister    Heart disease Mother    Hypertension Mother, Sister    No Known Problems Brother, Son        Tobacco Use    Smoking status: Former Smoker     Types: Cigarettes     Quit date: 1999     Years since quittin.8    Smokeless tobacco: Never Used   Substance and Sexual Activity    Alcohol use: No     Alcohol/week: 0.0 standard drinks    Drug use: No    Sexual activity: Yes     Partners: Female     Review of Systems   Constitutional: Negative for chills and fever.   HENT: Negative for sore throat.    Eyes: Negative for visual disturbance.   Respiratory: Negative for shortness of breath.    Cardiovascular: Negative for chest pain.   Gastrointestinal: Negative for abdominal pain, nausea and vomiting.   Endocrine: Negative for polyuria.   Genitourinary: Negative for dysuria.   Musculoskeletal: Negative for back pain.   Skin: Negative for rash.   Neurological: Negative for syncope.   Psychiatric/Behavioral: Negative for confusion.     Objective:     Vital Signs (Most Recent):  Temp: 97.9 °F (36.6 °C) (22 1116)  Pulse: 64 (22 1318)  Resp: 13 (22 1318)  BP: (!) 101/53  (02/01/22 1116)  SpO2: 100 % (02/01/22 1318) Vital Signs (24h Range):  Temp:  [97.9 °F (36.6 °C)] 97.9 °F (36.6 °C)  Pulse:  [64-66] 64  Resp:  [13-20] 13  SpO2:  [100 %] 100 %  BP: (101)/(53) 101/53     Weight: 77.1 kg (170 lb)  Body mass index is 24.39 kg/m².    Physical Exam  Constitutional:       General: He is not in acute distress.     Appearance: He is not ill-appearing, toxic-appearing or diaphoretic.   HENT:      Head: Normocephalic and atraumatic.      Nose: Nose normal.   Eyes:      Extraocular Movements: Extraocular movements intact.      Conjunctiva/sclera: Conjunctivae normal.   Cardiovascular:      Rate and Rhythm: Normal rate and regular rhythm.   Pulmonary:      Effort: Pulmonary effort is normal.      Breath sounds: Normal breath sounds.   Abdominal:      General: Bowel sounds are normal. There is no distension.      Palpations: Abdomen is soft.      Tenderness: There is no abdominal tenderness. There is no guarding or rebound.   Musculoskeletal:         General: Normal range of motion.      Cervical back: Normal range of motion and neck supple.   Skin:     General: Skin is warm and dry.   Neurological:      General: No focal deficit present.      Mental Status: He is alert.   Psychiatric:         Mood and Affect: Mood normal.         Behavior: Behavior normal.         Thought Content: Thought content normal.         Judgment: Judgment normal.             Significant Labs: All pertinent labs within the past 24 hours have been reviewed.    Significant Imaging: I have reviewed all pertinent imaging results/findings within the past 24 hours.    Assessment/Plan:     * Hyperkalemia  Metabolic acidosis  - s/p emergent treatment in the ED with calcium gluconate, insulin/D50 and NEBs  - Hold ARB which may be cause for intermittent hyperkalemia  - Will repeat labs and consider zirconium if still high  - Will give dose of bicarb  - Nephrology consulted  - Monitor I/Os and repeat labs in am    CKD (chronic  kidney disease) stage 4, GFR 15-29 ml/min  - Baseline creatinine around 2.5 and it is consistent with current level  - Hold ARB as discussed above  - Nephrology consulted  - Repeat labs in am    Atherosclerosis of native artery of both lower extremities with intermittent claudication  PAD  - Continue ASA, plavix and statin  - Appreciate Cardiology input  - Plan for outpatient revascularization on Friday as previously planned    Anemia in stage 3b chronic kidney disease  - H/H consistent with previous levels, monitor    Coronary artery disease involving native coronary artery of native heart without angina pectoris  - No reported chest pain  - Continue ASA, plavix, atorvastatin and carvedilol    Hyperlipidemia  - Continue atorvastatin    HTN (hypertension)  - Continue nifedipine, hydralazine, doxazosin and carvedilol  - ARB on hold  - Will monitor BP, and may need to titrate medications for BP control    VTE Risk Mitigation (From admission, onward)         Ordered     enoxaparin injection 40 mg  Daily         02/01/22 1510     IP VTE HIGH RISK PATIENT  Once         02/01/22 1510     Place sequential compression device  Until discontinued         02/01/22 1510                   Kyra Weston MD  Department of Hospital Medicine   [place] - Emergency Dept

## 2022-02-01 NOTE — ASSESSMENT & PLAN NOTE
- Continue nifedipine, hydralazine, doxazosin and carvedilol  - ARB on hold  - Will monitor BP, and may need to titrate medications for BP control

## 2022-02-01 NOTE — ASSESSMENT & PLAN NOTE
Planned for revascularization on Friday as outpatient- will proceed as planned and repeat labs on day of procedure. Patient does not need to be admitted for PAD  Continue medical management with asa, Plavix, statin

## 2022-02-01 NOTE — ASSESSMENT & PLAN NOTE
PAD  - Continue ASA, plavix and statin  - Appreciate Cardiology input  - Plan for outpatient revascularization on Friday as previously planned

## 2022-02-01 NOTE — TELEPHONE ENCOUNTER
Received call from Sherin with [facility] with critical lab result    Potassium 6.5    Sherin reports the specimen was NOT hemolyzed

## 2022-02-01 NOTE — ASSESSMENT & PLAN NOTE
S/p PCI of RCA, LAD, LCx  Last coronary intervention was in 2019  No reports of chest pain  Continue medical management with DAPT, statin, BB  Holding ARB given significant hyperkalemia

## 2022-02-01 NOTE — ASSESSMENT & PLAN NOTE
- Baseline creatinine around 2.5 and it is consistent with current level  - Hold ARB as discussed above  - Nephrology consulted  - Repeat labs in am

## 2022-02-01 NOTE — ASSESSMENT & PLAN NOTE
SBP 100s-120 in the ED  Continue BB, doxazosin, Nifedipine, Hydralazine  Holding ARB as previously stated  If BP is uncontrolled- up titrate CCB or hydralazine

## 2022-02-01 NOTE — TELEPHONE ENCOUNTER
Reached out to in regards to elevated Potassium level   Verified he is not on dialysis     After speaking with Dr Rivers and reviewing chart, instructed pt to come to the ED for evaluation and treatment     Verbalized understanding and stated that he will come now.

## 2022-02-01 NOTE — ASSESSMENT & PLAN NOTE
Metabolic acidosis  - s/p emergent treatment in the ED with calcium gluconate, insulin/D50 and NEBs  - Hold ARB which may be cause for intermittent hyperkalemia  - Will repeat labs and consider zirconium if still high  - Will give dose of bicarb  - Nephrology consulted  - Monitor I/Os and repeat labs in am

## 2022-02-02 ENCOUNTER — PATIENT MESSAGE (OUTPATIENT)
Dept: CARDIOLOGY | Facility: CLINIC | Age: 62
End: 2022-02-02
Payer: MEDICAID

## 2022-02-02 ENCOUNTER — PATIENT MESSAGE (OUTPATIENT)
Dept: NEPHROLOGY | Facility: CLINIC | Age: 62
End: 2022-02-02
Payer: MEDICAID

## 2022-02-02 LAB
ALBUMIN SERPL BCP-MCNC: 3.7 G/DL (ref 3.5–5.2)
ALP SERPL-CCNC: 57 U/L (ref 55–135)
ALT SERPL W/O P-5'-P-CCNC: 6 U/L (ref 10–44)
ANION GAP SERPL CALC-SCNC: 8 MMOL/L (ref 8–16)
AST SERPL-CCNC: 13 U/L (ref 10–40)
BASOPHILS # BLD AUTO: 0.04 K/UL (ref 0–0.2)
BASOPHILS NFR BLD: 0.9 % (ref 0–1.9)
BILIRUB SERPL-MCNC: 0.5 MG/DL (ref 0.1–1)
BUN SERPL-MCNC: 34 MG/DL (ref 6–20)
CALCIUM SERPL-MCNC: 9.8 MG/DL (ref 8.7–10.5)
CHLORIDE SERPL-SCNC: 106 MMOL/L (ref 95–110)
CO2 SERPL-SCNC: 21 MMOL/L (ref 23–29)
CREAT SERPL-MCNC: 2.3 MG/DL (ref 0.5–1.4)
DIFFERENTIAL METHOD: ABNORMAL
EOSINOPHIL # BLD AUTO: 0.2 K/UL (ref 0–0.5)
EOSINOPHIL NFR BLD: 3.7 % (ref 0–8)
ERYTHROCYTE [DISTWIDTH] IN BLOOD BY AUTOMATED COUNT: 14.3 % (ref 11.5–14.5)
EST. GFR  (AFRICAN AMERICAN): 34 ML/MIN/1.73 M^2
EST. GFR  (NON AFRICAN AMERICAN): 30 ML/MIN/1.73 M^2
GLUCOSE SERPL-MCNC: 101 MG/DL (ref 70–110)
HCT VFR BLD AUTO: 31.8 % (ref 40–54)
HGB BLD-MCNC: 10.3 G/DL (ref 14–18)
IMM GRANULOCYTES # BLD AUTO: 0.01 K/UL (ref 0–0.04)
IMM GRANULOCYTES NFR BLD AUTO: 0.2 % (ref 0–0.5)
LYMPHOCYTES # BLD AUTO: 0.8 K/UL (ref 1–4.8)
LYMPHOCYTES NFR BLD: 19.2 % (ref 18–48)
MAGNESIUM SERPL-MCNC: 1.8 MG/DL (ref 1.6–2.6)
MCH RBC QN AUTO: 28.4 PG (ref 27–31)
MCHC RBC AUTO-ENTMCNC: 32.4 G/DL (ref 32–36)
MCV RBC AUTO: 88 FL (ref 82–98)
MONOCYTES # BLD AUTO: 0.5 K/UL (ref 0.3–1)
MONOCYTES NFR BLD: 11.7 % (ref 4–15)
NEUTROPHILS # BLD AUTO: 2.8 K/UL (ref 1.8–7.7)
NEUTROPHILS NFR BLD: 64.3 % (ref 38–73)
NRBC BLD-RTO: 0 /100 WBC
PHOSPHATE SERPL-MCNC: 4.3 MG/DL (ref 2.7–4.5)
PLATELET # BLD AUTO: 148 K/UL (ref 150–450)
PMV BLD AUTO: 10.2 FL (ref 9.2–12.9)
POCT GLUCOSE: 102 MG/DL (ref 70–110)
POCT GLUCOSE: 162 MG/DL (ref 70–110)
POCT GLUCOSE: 82 MG/DL (ref 70–110)
POTASSIUM SERPL-SCNC: 5.8 MMOL/L (ref 3.5–5.1)
PROT SERPL-MCNC: 6.4 G/DL (ref 6–8.4)
RBC # BLD AUTO: 3.63 M/UL (ref 4.6–6.2)
SODIUM SERPL-SCNC: 135 MMOL/L (ref 136–145)
WBC # BLD AUTO: 4.37 K/UL (ref 3.9–12.7)

## 2022-02-02 PROCEDURE — G0378 HOSPITAL OBSERVATION PER HR: HCPCS

## 2022-02-02 PROCEDURE — 25000003 PHARM REV CODE 250: Performed by: HOSPITALIST

## 2022-02-02 PROCEDURE — 96366 THER/PROPH/DIAG IV INF ADDON: CPT

## 2022-02-02 PROCEDURE — 83735 ASSAY OF MAGNESIUM: CPT | Performed by: HOSPITALIST

## 2022-02-02 PROCEDURE — 94640 AIRWAY INHALATION TREATMENT: CPT

## 2022-02-02 PROCEDURE — 84100 ASSAY OF PHOSPHORUS: CPT | Performed by: HOSPITALIST

## 2022-02-02 PROCEDURE — 85025 COMPLETE CBC W/AUTO DIFF WBC: CPT | Performed by: HOSPITALIST

## 2022-02-02 PROCEDURE — 25000003 PHARM REV CODE 250: Performed by: INTERNAL MEDICINE

## 2022-02-02 PROCEDURE — 80053 COMPREHEN METABOLIC PANEL: CPT | Performed by: HOSPITALIST

## 2022-02-02 PROCEDURE — 96372 THER/PROPH/DIAG INJ SC/IM: CPT | Performed by: HOSPITALIST

## 2022-02-02 PROCEDURE — A4216 STERILE WATER/SALINE, 10 ML: HCPCS | Performed by: HOSPITALIST

## 2022-02-02 PROCEDURE — 82962 GLUCOSE BLOOD TEST: CPT

## 2022-02-02 PROCEDURE — 25000242 PHARM REV CODE 250 ALT 637 W/ HCPCS: Performed by: INTERNAL MEDICINE

## 2022-02-02 PROCEDURE — 63600175 PHARM REV CODE 636 W HCPCS: Performed by: HOSPITALIST

## 2022-02-02 PROCEDURE — 96372 THER/PROPH/DIAG INJ SC/IM: CPT | Mod: 59

## 2022-02-02 RX ORDER — SODIUM BICARBONATE 650 MG/1
650 TABLET ORAL 3 TIMES DAILY
Status: DISCONTINUED | OUTPATIENT
Start: 2022-02-02 | End: 2022-02-03 | Stop reason: HOSPADM

## 2022-02-02 RX ORDER — ALBUTEROL SULFATE 2.5 MG/.5ML
5 SOLUTION RESPIRATORY (INHALATION) ONCE
Status: COMPLETED | OUTPATIENT
Start: 2022-02-02 | End: 2022-02-02

## 2022-02-02 RX ADMIN — CARVEDILOL 50 MG: 25 TABLET, FILM COATED ORAL at 05:02

## 2022-02-02 RX ADMIN — SODIUM ZIRCONIUM CYCLOSILICATE 10 G: 10 POWDER, FOR SUSPENSION ORAL at 08:02

## 2022-02-02 RX ADMIN — ASPIRIN 81 MG: 81 TABLET, COATED ORAL at 08:02

## 2022-02-02 RX ADMIN — SODIUM BICARBONATE 650 MG: 650 TABLET ORAL at 02:02

## 2022-02-02 RX ADMIN — CLOPIDOGREL 75 MG: 75 TABLET, FILM COATED ORAL at 08:02

## 2022-02-02 RX ADMIN — ATORVASTATIN CALCIUM 80 MG: 40 TABLET, FILM COATED ORAL at 08:02

## 2022-02-02 RX ADMIN — SODIUM BICARBONATE 650 MG: 650 TABLET ORAL at 08:02

## 2022-02-02 RX ADMIN — DOXAZOSIN 4 MG: 2 TABLET ORAL at 08:02

## 2022-02-02 RX ADMIN — HYDRALAZINE HYDROCHLORIDE 100 MG: 25 TABLET, FILM COATED ORAL at 08:02

## 2022-02-02 RX ADMIN — Medication 10 ML: at 02:02

## 2022-02-02 RX ADMIN — ALBUTEROL SULFATE 5 MG: 2.5 SOLUTION RESPIRATORY (INHALATION) at 08:02

## 2022-02-02 RX ADMIN — Medication 10 ML: at 08:02

## 2022-02-02 RX ADMIN — ENOXAPARIN SODIUM 40 MG: 100 INJECTION SUBCUTANEOUS at 05:02

## 2022-02-02 NOTE — ASSESSMENT & PLAN NOTE
Metabolic acidosis  - s/p emergent treatment in the ED with calcium gluconate, insulin/D50 and NEBs  - Hold ARB which may be cause for intermittent hyperkalemia  - s/p bicarb, lasix and zirconium yesterday and Nephrology following  - Potassium peaked at 7.3, but is now at 5.8  - Acidosis level improving  - Repeat zirconium ordered for today  - Monitor I/Os and repeat labs in am

## 2022-02-02 NOTE — PROGRESS NOTES
[facility] Emergency Dept  [facility] Medicine  Progress Note    Patient Name: Domingo Gross  MRN: [id number]  Patient Class: OP- Observation   Admission Date: 2/1/2022  Length of Stay: 0 days  Attending Physician: Kyra Weston MD  Primary Care Provider: Analy Encarnacion MD        Subjective:     Principal Problem:Hyperkalemia        HPI:  60 y/o male with CAD, PAD, PAF, HTN, CKD who was sent to the ER for abnormal lab with potassium of 6.5 on blood work drawn this morning. Denies chest pain, SOB, N/V, diaphoresis and is essentially without complaints. Patient has h/o bilateral SFA, GRUPO, EIA, and R CFA revascularization and is scheduled for repeat revascularization on Friday. Preop labs with high potassium but with creatinine at his baseline at 2.5.  In his recent hospitalization in the past, Nephrology recommended a renal biopsy but patient was not interested at that time. Also, noted to have hyperkalemia in the past which was corrected after stopping potassium supplements. In the ER, repeat potassium at 6.4 and bicarb of 12. Treatment given with calcium gluconate, insulin/D50 and NEBS.      Interval History:  No acute issues, wanting to go home and tired of being in the hospital.    Review of Systems   Constitutional: Negative for chills and fever.   Respiratory: Negative for shortness of breath.    Cardiovascular: Negative for chest pain.     Objective:     Vital Signs (Most Recent):  Temp: 98 °F (36.7 °C) (02/01/22 1745)  Pulse: 67 (02/02/22 0855)  Resp: (!) 23 (02/02/22 0855)  BP: 129/73 (HR 68) (02/02/22 0855)  SpO2: 99 % (02/02/22 0855) Vital Signs (24h Range):  Temp:  [98 °F (36.7 °C)-98.3 °F (36.8 °C)] 98 °F (36.7 °C)  Pulse:  [62-67] 67  Resp:  [12-23] 23  SpO2:  [98 %-100 %] 99 %  BP: (110-145)/(60-73) 129/73     Weight: 77.1 kg (170 lb)  Body mass index is 24.39 kg/m².    Intake/Output Summary (Last 24 hours) at 2/2/2022 1347  Last data filed at 2/2/2022 0947  Gross per 24 hour   Intake 991.8 ml   Output  3020 ml   Net -2028.2 ml      Physical Exam  Constitutional:       General: He is not in acute distress.     Appearance: He is not ill-appearing, toxic-appearing or diaphoretic.   HENT:      Head: Normocephalic and atraumatic.      Nose: Nose normal.   Eyes:      Extraocular Movements: Extraocular movements intact.      Conjunctiva/sclera: Conjunctivae normal.   Cardiovascular:      Rate and Rhythm: Normal rate and regular rhythm.   Pulmonary:      Effort: Pulmonary effort is normal.      Breath sounds: Normal breath sounds.   Abdominal:      General: Bowel sounds are normal. There is no distension.      Palpations: Abdomen is soft.      Tenderness: There is no abdominal tenderness. There is no guarding or rebound.   Musculoskeletal:         General: Normal range of motion.      Cervical back: Normal range of motion and neck supple.   Skin:     General: Skin is warm and dry.   Neurological:      General: No focal deficit present.      Mental Status: He is alert.   Psychiatric:         Mood and Affect: Mood normal.         Behavior: Behavior normal.         Thought Content: Thought content normal.         Judgment: Judgment normal.         Significant Labs: All pertinent labs within the past 24 hours have been reviewed.    Significant Imaging: I have reviewed all pertinent imaging results/findings within the past 24 hours.      Assessment/Plan:      * Hyperkalemia  Metabolic acidosis  - s/p emergent treatment in the ED with calcium gluconate, insulin/D50 and NEBs  - Hold ARB which may be cause for intermittent hyperkalemia  - s/p bicarb, lasix and zirconium yesterday and Nephrology following  - Potassium peaked at 7.3, but is now at 5.8  - Acidosis level improving  - Repeat zirconium ordered for today  - Monitor I/Os and repeat labs in am    CKD (chronic kidney disease) stage 4, GFR 15-29 ml/min  Nephrotic syndrome  - Baseline creatinine around 2.5 and it is consistent with current level  - Hold ARB as discussed  above  - Nephrology consulted and workup in progress for nephrotic syndrome   - As per Nephrology  - Repeat labs in am    Atherosclerosis of native artery of both lower extremities with intermittent claudication  PAD  - Continue ASA, plavix and statin  - Appreciate Cardiology input  - Plan for outpatient revascularization on Friday as previously planned    Anemia in stage 3b chronic kidney disease  - H/H consistent with previous levels  - No monitoring needed    Coronary artery disease involving native coronary artery of native heart without angina pectoris  - No reported chest pain  - Continue ASA, plavix, atorvastatin and carvedilol    Hyperlipidemia  - Continue atorvastatin    HTN (hypertension)  - Continue nifedipine, hydralazine, doxazosin and carvedilol  - ARB on hold, BP well controlled  - Will monitor BP, and may need to titrate medications for BP control      VTE Risk Mitigation (From admission, onward)         Ordered     enoxaparin injection 40 mg  Daily         02/01/22 1510     IP VTE HIGH RISK PATIENT  Once         02/01/22 1510     Place sequential compression device  Until discontinued         02/01/22 1510                      Kyra Weston MD  Department of Hospital Medicine   [place] - Emergency Dept

## 2022-02-02 NOTE — ED NOTES
HELEN Kent informed of pts K+ of 6.5. Will draw additional BMP after pt receives breathing treatment.

## 2022-02-02 NOTE — PROGRESS NOTES
Progress Note  Nephrology      Consult Requested By: Kyra Weston MD      SUBJECTIVE:     Overnight events  Patient is a 61 y.o. male     Patient Active Problem List   Diagnosis    HTN (hypertension)    Elevated fasting blood sugar    Elevated TSH    Atherosclerosis of leg with intermittent claudication    PAD (peripheral artery disease)    Claudication in peripheral vascular disease    Limb pain    History of back injury    Myalgia    DJD (degenerative joint disease), lumbar    Lumbar facet arthropathy    DDD (degenerative disc disease)    Spondylosis without myelopathy    Hyperlipidemia    Acute pain of left knee    Left knee pain    Pain of left lower extremity    Atherosclerosis of native artery of both lower extremities with intermittent claudication    Atherosclerosis of native artery of right lower extremity with intermittent claudication    Venous insufficiency of both lower extremities    Localized edema    Abnormal cardiovascular stress test    Coronary artery disease involving native coronary artery of native heart without angina pectoris    Bilateral carotid artery stenosis    Cardiac arrest    Atrial fibrillation with RVR    Acute renal failure    Nephrotic range proteinuria    Hypertensive kidney disease with stage 3 chronic kidney disease    Nuclear sclerosis, bilateral    Nuclear sclerotic cataract of left eye    Vitamin D deficiency    Anemia    CKD (chronic kidney disease) stage 4, GFR 15-29 ml/min    COPD (chronic obstructive pulmonary disease)    Anemia in stage 3b chronic kidney disease    Anemia due to stage 4 chronic kidney disease    Hyperkalemia     Past Medical History:   Diagnosis Date    Allergy     Anemia     Anticoagulant long-term use     Arthritis     CKD (chronic kidney disease) stage 4, GFR 15-29 ml/min     COPD (chronic obstructive pulmonary disease) 8/20/2021    Coronary artery disease     Heart attack 10/04/2019    Hematuria      Hemothorax     Hyperlipidemia     Hypertension     Hyperuricemia     Hypocalcemia     Hypokalemia     Hypophosphatemia     PAD (peripheral artery disease)     Proteinuria     Vitamin D deficiency               OBJECTIVE:     Vitals:    02/02/22 0500 02/02/22 0756 02/02/22 0855 02/02/22 1301   BP: 131/73 120/72 129/73 114/73   Patient Position:   Lying    Pulse: 66 67 67 66   Resp: 14 13 (!) 23 12   Temp:       TempSrc:       SpO2: 100% 99% 99% 99%   Weight:           Temp: 98 °F (36.7 °C) (02/01/22 1745)  Pulse: 66 (02/02/22 1301)  Resp: 12 (02/02/22 1301)  BP: 114/73 (02/02/22 1301)  SpO2: 99 % (02/02/22 1301)    Date 02/02/22 0700 - 02/03/22 0659   Shift 3239-0931 8848-8473 1109-2739 24 Hour Total   INTAKE   I.V.(mL/kg) 991.8(12.9)   991.8(12.9)   Shift Total(mL/kg) 991.8(12.9)   991.8(12.9)   OUTPUT   Shift Total(mL/kg)       Weight (kg) 77.1 77.1 77.1 77.1             Medications:   aspirin  81 mg Oral Daily    atorvastatin  80 mg Oral Daily    carvediloL  50 mg Oral BID WM    clopidogreL  75 mg Oral Daily    doxazosin  4 mg Oral QHS    enoxaparin  40 mg Subcutaneous Daily    hydrALAZINE  100 mg Oral BID    NIFEdipine  60 mg Oral Daily    polyethylene glycol  17 g Oral Daily    sodium bicarbonate  650 mg Oral TID    sodium chloride 0.9%  10 mL Intravenous Q8H    sodium zirconium cyclosilicate  10 g Oral Daily                 Physical Exam:  General appearance:NAD   No SOB   No CP   No cough  Lungs: diminished breath sounds  Heart: pulse 66  Abdomen: soft  Extremities: no  edema  Skin: dry  Laboratory:  ABG  Labs reviewed  Recent Results (from the past 336 hour(s))   Basic metabolic panel    Collection Time: 02/01/22  8:47 PM   Result Value Ref Range    Sodium 134 (L) 136 - 145 mmol/L    Potassium 7.3 (HH) 3.5 - 5.1 mmol/L    Chloride 113 (H) 95 - 110 mmol/L    CO2 16 (L) 23 - 29 mmol/L    BUN 34 (H) 6 - 20 mg/dL    Creatinine 2.4 (H) 0.5 - 1.4 mg/dL    Calcium 9.5 8.7 - 10.5 mg/dL    Anion  Gap 5 (L) 8 - 16 mmol/L   Basic Metabolic Panel    Collection Time: 02/01/22  6:51 PM   Result Value Ref Range    Sodium 135 (L) 136 - 145 mmol/L    Potassium 6.5 (HH) 3.5 - 5.1 mmol/L    Chloride 114 (H) 95 - 110 mmol/L    CO2 16 (L) 23 - 29 mmol/L    BUN 35 (H) 6 - 20 mg/dL    Creatinine 2.4 (H) 0.5 - 1.4 mg/dL    Calcium 8.8 8.7 - 10.5 mg/dL    Anion Gap 5 (L) 8 - 16 mmol/L   BASIC METABOLIC PANEL    Collection Time: 02/01/22  9:01 AM   Result Value Ref Range    Sodium 134 (L) 136 - 145 mmol/L    Potassium 6.5 (HH) 3.5 - 5.1 mmol/L    Chloride 114 (H) 95 - 110 mmol/L    CO2 15 (L) 23 - 29 mmol/L    BUN 34 (H) 6 - 20 mg/dL    Creatinine 2.4 (H) 0.5 - 1.4 mg/dL    Calcium 8.6 (L) 8.7 - 10.5 mg/dL    Anion Gap 5 (L) 8 - 16 mmol/L     Recent Results (from the past 336 hour(s))   CBC with Automated Differential    Collection Time: 02/02/22  5:55 AM   Result Value Ref Range    WBC 4.37 3.90 - 12.70 K/uL    Hemoglobin 10.3 (L) 14.0 - 18.0 g/dL    Hematocrit 31.8 (L) 40.0 - 54.0 %    Platelets 148 (L) 150 - 450 K/uL   CBC auto differential    Collection Time: 02/01/22 11:37 AM   Result Value Ref Range    WBC 4.01 3.90 - 12.70 K/uL    Hemoglobin 9.9 (L) 14.0 - 18.0 g/dL    Hematocrit 31.0 (L) 40.0 - 54.0 %    Platelets 151 150 - 450 K/uL   CBC W/ AUTO DIFFERENTIAL    Collection Time: 02/01/22  9:01 AM   Result Value Ref Range    WBC 4.63 3.90 - 12.70 K/uL    Hemoglobin 9.8 (L) 14.0 - 18.0 g/dL    Hematocrit 30.4 (L) 40.0 - 54.0 %    Platelets 145 (L) 150 - 450 K/uL     Urinalysis  Recent Labs   Lab 02/01/22 2052   COLORU Colorless*   SPECGRAV 1.010   PHUR 6.0   PROTEINUA 2+*   BACTERIA None   NITRITE Negative   LEUKOCYTESUR Negative   UROBILINOGEN Negative   HYALINECASTS 0       Diagnostic Results:  X-Ray: Reviewed  US: Reviewed  Echo: Reviewed  ACCESS    ASSESSMENT/PLAN:   BRETT/ CKD 4  CAD  HTN  PVD  UA protein 3+  Nephrotic range proteinuria (2020)  Kidney biopsy was not done in 2020 ( patient was  on plavix,  aspirin)  US 2020  The right kidney measures 11.8 cm. The left kidney measures 11.7 cm.  There is equivocal mild increased echogenicity of the renal parenchyma bilaterally.  No solid or cystic masses. No hydronephrosis.The bladder is unremarkable in appearance.  Right kidney RI 0.70.  Left kidney RI 0.71.  Splenic RI of 0.58.  Creatinine 2.3.  BUN 34.  Hyperkalemia 5.8.  Treated with medications.  Metabolic acidosis.  Metabolic bone disease.  Anemia multifactorial.  Hb 10.3.  Poor nutrition.  Albumin 3.7.  Blood pressure 114/73  Echo 2021  · Normal systolic function.  · The estimated ejection fraction is 55%.  · Indeterminate left ventricular diastolic function.  · Normal right ventricular size with normal right ventricular systolic function.  · Mild-to-moderate mitral regurgitation.  · Moderate pulmonic regurgitation.  · Normal central venous pressure (3 mmHg).  · The estimated PA systolic pressure is 26 mmHg.        I and O  Avoid nephrotoxic agents, hypotension, hypovolemia  Discussed low  potassium, renal  diet.

## 2022-02-02 NOTE — PROGRESS NOTES
[facility]           Pharmacy        Current Drug Shortage     Due to national backorder and [facility] is critically low on inventory of Dextrose 50% (D50) Syringes and Vials, pharmacy has automatically switched from D50% to D10% IVPB at the equivalent dose until resolution of the shortage per P&T approved protocol.               GALDINO Lepe PharmD, [facility]  [phone]

## 2022-02-02 NOTE — ED NOTES
Report received from NICOLAS Lynne  Care assumed. Pt AAOx4. Respirations even and unlabored. Pt VSS on cardiac monitor.. Will continue to monitor.

## 2022-02-02 NOTE — ASSESSMENT & PLAN NOTE
- Continue nifedipine, hydralazine, doxazosin and carvedilol  - ARB on hold, BP well controlled  - Will monitor BP, and may need to titrate medications for BP control

## 2022-02-02 NOTE — ASSESSMENT & PLAN NOTE
Nephrotic syndrome  - Baseline creatinine around 2.5 and it is consistent with current level  - Hold ARB as discussed above  - Nephrology consulted and workup in progress for nephrotic syndrome   - As per Nephrology  - Repeat labs in am

## 2022-02-02 NOTE — CONSULTS
NEPHROLOGY CONSULT NOTE    HPI & INTERVAL HISTORY:    Past Medical History:   Diagnosis Date    Allergy     Anemia     Anticoagulant long-term use     Arthritis     CKD (chronic kidney disease) stage 4, GFR 15-29 ml/min     COPD (chronic obstructive pulmonary disease) 8/20/2021    Coronary artery disease     Heart attack 10/04/2019    Hematuria     Hemothorax     Hyperlipidemia     Hypertension     Hyperuricemia     Hypocalcemia     Hypokalemia     Hypophosphatemia     PAD (peripheral artery disease)     Proteinuria     Vitamin D deficiency       Past Surgical History:   Procedure Laterality Date    ABDOMINAL AORTOGRAPHY N/A 5/10/2019    Procedure: AORTOGRAM-ABDOMINAL;  Surgeon: Keo Jenkins MD;  Location: [facility] CATH LAB/EP;  Service: Cardiology;  Laterality: N/A;  RSFA intervention     AORTOGRAPHY WITH SERIALOGRAPHY N/A 12/3/2021    Procedure: AORTOGRAM, WITH SERIALOGRAPHY;  Surgeon: Keo Jenkins MD;  Location: [facility] CATH LAB/EP;  Service: Cardiology;  Laterality: N/A;    BONE MARROW BIOPSY Right 10/12/2021    Procedure: BIOPSY-BONE MARROW;  Surgeon: Naseem Woods MD;  Location: [facility] OR;  Service: Oncology;  Laterality: Right;    CARDIAC CATHETERIZATION      CATARACT EXTRACTION      CATARACT EXTRACTION W/  INTRAOCULAR LENS IMPLANT Right 6/8/2020    Procedure: EXTRACTION, CATARACT, WITH IOL INSERTION;  Surgeon: Tin Light MD;  Location: [facility] OR;  Service: Ophthalmology;  Laterality: Right;    CATARACT EXTRACTION W/  INTRAOCULAR LENS IMPLANT Left 7/2/2020    Procedure: EXTRACTION, CATARACT, WITH IOL INSERTION;  Surgeon: Tin Light MD;  Location: [facility] OR;  Service: Ophthalmology;  Laterality: Left;    COLONOSCOPY N/A 1/6/2022    Procedure: COLONOSCOPY;  Surgeon: Kathe Penaloza MD;  Location: [facility] ENDO [address]);  Service: Endoscopy;  Laterality: N/A;  COVID test at [facility] on 1/3-GT  okay to hold Plavix for 5 days and aspirin per Dr. Becerra  2nd floor due toextensive  cardiac history   instructions mailed and my [facility] portal -     CORONARY ANGIOGRAPHY N/A 3/29/2019    Procedure: ANGIOGRAM, CORONARY ARTERY;  Surgeon: Keo Jenkins MD;  Location: [facility] CATH LAB/EP;  Service: Cardiology;  Laterality: N/A;    CORONARY ANGIOGRAPHY Left 10/11/2019    Procedure: ANGIOGRAM, CORONARY ARTERY;  Surgeon: Keo Jenkins MD;  Location: [facility] CATH LAB/EP;  Service: Cardiology;  Laterality: Left;    CORONARY ANGIOPLASTY WITH STENT PLACEMENT  2019    mid and distal RCA    ESOPHAGOGASTRODUODENOSCOPY N/A 2022    Procedure: EGD (ESOPHAGOGASTRODUODENOSCOPY);  Surgeon: Kathe Penaloza MD;  Location: [address]);  Service: Endoscopy;  Laterality: N/A;    EYE SURGERY      LEFT HEART CATHETERIZATION N/A 3/29/2019    Procedure: Left heart cath;  Surgeon: Keo Jenkins MD;  Location: [facility] CATH LAB/EP;  Service: Cardiology;  Laterality: N/A;    LEFT HEART CATHETERIZATION Left 10/8/2019    Procedure: Left heart cath;  Surgeon: Keo Jenkins MD;  Location: [facility] CATH LAB/EP;  Service: Cardiology;  Laterality: Left;    PERCUTANEOUS TRANSLUMINAL ANGIOPLASTY (PTA) OF PERIPHERAL VESSEL N/A 2019    Procedure: PTA, PERIPHERAL VESSEL;  Surgeon: Keo Jenkins MD;  Location: [facility] CATH LAB/EP;  Service: Cardiology;  Laterality: N/A;      Review of patient's allergies indicates:   Allergen Reactions    Ace inhibitors Rash      (Not in a hospital admission)      Social History     Socioeconomic History    Marital status:    Occupational History     Employer: [employer]   Tobacco Use    Smoking status: Former Smoker     Types: Cigarettes     Quit date: 1999     Years since quittin.8    Smokeless tobacco: Never Used   Substance and Sexual Activity    Alcohol use: No     Alcohol/week: 0.0 standard drinks    Drug use: No    Sexual activity: Yes     Partners: Female     Social Determinants of Health     Financial Resource Strain: Medium Risk    Difficulty of  Paying Living Expenses: Somewhat hard   Food Insecurity: Food Insecurity Present    Worried About Running Out of Food in the Last Year: Sometimes true    Ran Out of Food in the Last Year: Sometimes true   Transportation Needs: No Transportation Needs    Lack of Transportation (Medical): No    Lack of Transportation (Non-Medical): No   Physical Activity: Inactive    Days of Exercise per Week: 0 days    Minutes of Exercise per Session: 10 min   Stress: No Stress Concern Present    Feeling of Stress : Not at all   Social Connections: Unknown    Frequency of Communication with Friends and Family: More than three times a week    Frequency of Social Gatherings with Friends and Family: Once a week    Active Member of Clubs or Organizations: No    Attends Club or Organization Meetings: Never    Marital Status:    Housing Stability: Unknown    Unable to Pay for Housing in the Last Year: No    Unstable Housing in the Last Year: No        MEDS   [START ON 2/2/2022] aspirin  81 mg Oral Daily    [START ON 2/2/2022] atorvastatin  80 mg Oral Daily    carvediloL  50 mg Oral BID WM    [START ON 2/2/2022] clopidogreL  75 mg Oral Daily    doxazosin  4 mg Oral QHS    enoxaparin  40 mg Subcutaneous Daily    hydrALAZINE  100 mg Oral BID    [START ON 2/2/2022] NIFEdipine  60 mg Oral Daily    polyethylene glycol  17 g Oral Daily    sodium chloride 0.9%  10 mL Intravenous Q8H                 CONTINOUS INFUSIONS:    No intake or output data in the 24 hours ending 02/01/22 1830     HEMODYNAMICS:    Temp:  [97.9 °F (36.6 °C)-98.3 °F (36.8 °C)] 98 °F (36.7 °C)  Pulse:  [58-67] 63  Resp:  [12-20] 16  SpO2:  [99 %-100 %] 99 %  BP: (101-137)/(53-65) 127/62   General : NAD  No nausea  No vomiting  No diarrhea  No fever  No chills  No CP  No SOB  No cough  No dysuria  Cardiology : pulse 60  Pulmonary : diminished breath sounds  Abdomen : soft   Extremities : No edema  Skin: dry  LABS   Lab Results   Component Value  Date    WBC 4.01 02/01/2022    HGB 9.9 (L) 02/01/2022    HCT 31.0 (L) 02/01/2022    MCV 90 02/01/2022     02/01/2022        Recent Labs   Lab 02/01/22  1137   *   CALCIUM 8.3*   ALBUMIN 3.6   PROT 6.3   *   K 6.4*   CO2 12*   *   BUN 35*   CREATININE 2.5*   ALKPHOS 57   ALT 11   AST 14   BILITOT 0.5      Lab Results   Component Value Date    PTH 60.6 08/11/2021    CALCIUM 8.3 (L) 02/01/2022    CAION 1.31 07/14/2020    PHOS 3.7 08/20/2021      Lab Results   Component Value Date    IRON 68 01/19/2022    TIBC 286 01/19/2022    FERRITIN 175 01/19/2022        ABG  No results for input(s): PH, PO2, PCO2, HCO3, BE in the last 168 hours.      IMAGING:  CXR    ASSESSMENT / PLAN  BRETT/ CKD 4  CAD  HTN  PVD  UA protein 3+  Nephrotic range proteinuria (2020)  Kidney biopsy was not done in 2020 ( patient was  on plavix, aspirin)  US 2020  The right kidney measures 11.8 cm. The left kidney measures 11.7 cm.  There is equivocal mild increased echogenicity of the renal parenchyma bilaterally.  No solid or cystic masses. No hydronephrosis.The bladder is unremarkable in appearance.  Right kidney RI 0.70.  Left kidney RI 0.71.  Splenic RI of 0.58.  Creatinine 2.5.  BUN 35.  Hyperkalemia.  Treated with medications.  Metabolic acidosis.  Metabolic bone disease.  Anemia multifactorial.  Hb 9.8.  Poor nutrition.  Albumin 3.6.  Blood pressure 127/62  Echo 2021  · Normal systolic function.  · The estimated ejection fraction is 55%.  · Indeterminate left ventricular diastolic function.  · Normal right ventricular size with normal right ventricular systolic function.  · Mild-to-moderate mitral regurgitation.  · Moderate pulmonic regurgitation.  · Normal central venous pressure (3 mmHg).  · The estimated PA systolic pressure is 26 mmHg.       Recheck BMP  I and O  Avoid nephrotoxic agents, hypotension, hypovolemia  Low potassium, renal  diet.

## 2022-02-02 NOTE — SUBJECTIVE & OBJECTIVE
Interval History:  No acute issues, wanting to go home and tired of being in the hospital.    Review of Systems   Constitutional: Negative for chills and fever.   Respiratory: Negative for shortness of breath.    Cardiovascular: Negative for chest pain.     Objective:     Vital Signs (Most Recent):  Temp: 98 °F (36.7 °C) (02/01/22 1745)  Pulse: 67 (02/02/22 0855)  Resp: (!) 23 (02/02/22 0855)  BP: 129/73 (HR 68) (02/02/22 0855)  SpO2: 99 % (02/02/22 0855) Vital Signs (24h Range):  Temp:  [98 °F (36.7 °C)-98.3 °F (36.8 °C)] 98 °F (36.7 °C)  Pulse:  [62-67] 67  Resp:  [12-23] 23  SpO2:  [98 %-100 %] 99 %  BP: (110-145)/(60-73) 129/73     Weight: 77.1 kg (170 lb)  Body mass index is 24.39 kg/m².    Intake/Output Summary (Last 24 hours) at 2/2/2022 1347  Last data filed at 2/2/2022 0947  Gross per 24 hour   Intake 991.8 ml   Output 3020 ml   Net -2028.2 ml      Physical Exam  Constitutional:       General: He is not in acute distress.     Appearance: He is not ill-appearing, toxic-appearing or diaphoretic.   HENT:      Head: Normocephalic and atraumatic.      Nose: Nose normal.   Eyes:      Extraocular Movements: Extraocular movements intact.      Conjunctiva/sclera: Conjunctivae normal.   Cardiovascular:      Rate and Rhythm: Normal rate and regular rhythm.   Pulmonary:      Effort: Pulmonary effort is normal.      Breath sounds: Normal breath sounds.   Abdominal:      General: Bowel sounds are normal. There is no distension.      Palpations: Abdomen is soft.      Tenderness: There is no abdominal tenderness. There is no guarding or rebound.   Musculoskeletal:         General: Normal range of motion.      Cervical back: Normal range of motion and neck supple.   Skin:     General: Skin is warm and dry.   Neurological:      General: No focal deficit present.      Mental Status: He is alert.   Psychiatric:         Mood and Affect: Mood normal.         Behavior: Behavior normal.         Thought Content: Thought content  normal.         Judgment: Judgment normal.         Significant Labs: All pertinent labs within the past 24 hours have been reviewed.    Significant Imaging: I have reviewed all pertinent imaging results/findings within the past 24 hours.

## 2022-02-02 NOTE — NURSING
tolerated food well, denies pain or current needs. states he would like to go home if possible. awaiting labs. called respiratory for ABG.

## 2022-02-03 VITALS
WEIGHT: 171.13 LBS | BODY MASS INDEX: 24.5 KG/M2 | DIASTOLIC BLOOD PRESSURE: 66 MMHG | TEMPERATURE: 99 F | RESPIRATION RATE: 17 BRPM | HEART RATE: 72 BPM | HEIGHT: 70 IN | OXYGEN SATURATION: 95 % | SYSTOLIC BLOOD PRESSURE: 123 MMHG

## 2022-02-03 LAB
ALBUMIN SERPL BCP-MCNC: 3.5 G/DL (ref 3.5–5.2)
ALP SERPL-CCNC: 56 U/L (ref 55–135)
ALT SERPL W/O P-5'-P-CCNC: 8 U/L (ref 10–44)
ANION GAP SERPL CALC-SCNC: 8 MMOL/L (ref 8–16)
AST SERPL-CCNC: 13 U/L (ref 10–40)
BASOPHILS # BLD AUTO: 0.05 K/UL (ref 0–0.2)
BASOPHILS NFR BLD: 1 % (ref 0–1.9)
BILIRUB SERPL-MCNC: 0.5 MG/DL (ref 0.1–1)
BUN SERPL-MCNC: 36 MG/DL (ref 6–20)
CALCIUM SERPL-MCNC: 8.8 MG/DL (ref 8.7–10.5)
CHLORIDE SERPL-SCNC: 107 MMOL/L (ref 95–110)
CO2 SERPL-SCNC: 23 MMOL/L (ref 23–29)
CREAT SERPL-MCNC: 2.2 MG/DL (ref 0.5–1.4)
DIFFERENTIAL METHOD: ABNORMAL
EOSINOPHIL # BLD AUTO: 0.2 K/UL (ref 0–0.5)
EOSINOPHIL NFR BLD: 3.5 % (ref 0–8)
ERYTHROCYTE [DISTWIDTH] IN BLOOD BY AUTOMATED COUNT: 13.9 % (ref 11.5–14.5)
EST. GFR  (AFRICAN AMERICAN): 36 ML/MIN/1.73 M^2
EST. GFR  (NON AFRICAN AMERICAN): 31 ML/MIN/1.73 M^2
GLUCOSE SERPL-MCNC: 84 MG/DL (ref 70–110)
HCT VFR BLD AUTO: 30.6 % (ref 40–54)
HGB BLD-MCNC: 10.1 G/DL (ref 14–18)
IMM GRANULOCYTES # BLD AUTO: 0.01 K/UL (ref 0–0.04)
IMM GRANULOCYTES NFR BLD AUTO: 0.2 % (ref 0–0.5)
LYMPHOCYTES # BLD AUTO: 1.5 K/UL (ref 1–4.8)
LYMPHOCYTES NFR BLD: 28.8 % (ref 18–48)
MAGNESIUM SERPL-MCNC: 1.8 MG/DL (ref 1.6–2.6)
MCH RBC QN AUTO: 28.9 PG (ref 27–31)
MCHC RBC AUTO-ENTMCNC: 33 G/DL (ref 32–36)
MCV RBC AUTO: 88 FL (ref 82–98)
MONOCYTES # BLD AUTO: 0.7 K/UL (ref 0.3–1)
MONOCYTES NFR BLD: 12.8 % (ref 4–15)
NEUTROPHILS # BLD AUTO: 2.8 K/UL (ref 1.8–7.7)
NEUTROPHILS NFR BLD: 53.7 % (ref 38–73)
NRBC BLD-RTO: 0 /100 WBC
PHOSPHATE SERPL-MCNC: 4 MG/DL (ref 2.7–4.5)
PLATELET # BLD AUTO: 135 K/UL (ref 150–450)
PMV BLD AUTO: 10.6 FL (ref 9.2–12.9)
POCT GLUCOSE: 103 MG/DL (ref 70–110)
POCT GLUCOSE: 84 MG/DL (ref 70–110)
POTASSIUM SERPL-SCNC: 5.1 MMOL/L (ref 3.5–5.1)
PROT SERPL-MCNC: 6 G/DL (ref 6–8.4)
RBC # BLD AUTO: 3.49 M/UL (ref 4.6–6.2)
SODIUM SERPL-SCNC: 138 MMOL/L (ref 136–145)
WBC # BLD AUTO: 5.14 K/UL (ref 3.9–12.7)

## 2022-02-03 PROCEDURE — A4216 STERILE WATER/SALINE, 10 ML: HCPCS | Performed by: HOSPITALIST

## 2022-02-03 PROCEDURE — 25000003 PHARM REV CODE 250: Performed by: HOSPITALIST

## 2022-02-03 PROCEDURE — 84100 ASSAY OF PHOSPHORUS: CPT | Performed by: HOSPITALIST

## 2022-02-03 PROCEDURE — 85025 COMPLETE CBC W/AUTO DIFF WBC: CPT | Performed by: HOSPITALIST

## 2022-02-03 PROCEDURE — 36415 COLL VENOUS BLD VENIPUNCTURE: CPT | Performed by: HOSPITALIST

## 2022-02-03 PROCEDURE — 80053 COMPREHEN METABOLIC PANEL: CPT | Performed by: HOSPITALIST

## 2022-02-03 PROCEDURE — 83735 ASSAY OF MAGNESIUM: CPT | Performed by: HOSPITALIST

## 2022-02-03 PROCEDURE — G0378 HOSPITAL OBSERVATION PER HR: HCPCS

## 2022-02-03 PROCEDURE — 25000003 PHARM REV CODE 250: Performed by: INTERNAL MEDICINE

## 2022-02-03 RX ORDER — NIFEDIPINE 60 MG/1
60 TABLET, EXTENDED RELEASE ORAL DAILY
Qty: 90 TABLET | Refills: 1 | Status: SHIPPED | OUTPATIENT
Start: 2022-02-03 | End: 2022-09-02 | Stop reason: SDUPTHER

## 2022-02-03 RX ADMIN — CLOPIDOGREL 75 MG: 75 TABLET, FILM COATED ORAL at 09:02

## 2022-02-03 RX ADMIN — Medication 10 ML: at 05:02

## 2022-02-03 RX ADMIN — NIFEDIPINE 60 MG: 30 TABLET, FILM COATED, EXTENDED RELEASE ORAL at 09:02

## 2022-02-03 RX ADMIN — SODIUM BICARBONATE 650 MG: 650 TABLET ORAL at 09:02

## 2022-02-03 RX ADMIN — ATORVASTATIN CALCIUM 80 MG: 40 TABLET, FILM COATED ORAL at 09:02

## 2022-02-03 RX ADMIN — HYDRALAZINE HYDROCHLORIDE 100 MG: 25 TABLET, FILM COATED ORAL at 09:02

## 2022-02-03 RX ADMIN — CARVEDILOL 50 MG: 25 TABLET, FILM COATED ORAL at 09:02

## 2022-02-03 RX ADMIN — ASPIRIN 81 MG: 81 TABLET, COATED ORAL at 09:02

## 2022-02-03 NOTE — PLAN OF CARE
SW met with pt at bedside to complete assessment. Pt will d/c home by driving self, pt stated his car is in the parking lot. Pt lives with wife jc [phone] who will be able to help with any care needs. Pt does not use any equipment at home. White board updated with CM name and contact information.  Discharge brochure provided.  Pt encouraged to call with any questions or concerns.  Cm will continue to follow pt through transitions of care and assist with any discharge needs.    Kerwin Jairo, MSW  [phone]    Future Appointments   Date Time Provider Department Center   2/15/2022  8:45 AM LAB, [facility] [place]   2/17/2022  2:20 PM Naseem Woods MD [facility] HEM ON[facility] [place] [facility]   4/14/2022 11:00 AM Analy Encarnacion MD [facility] IM [place]   6/2/2022  2:00 PM Nato Payne DO [facility]  [place] [facility]        02/03/22 0936   Discharge Assessment   Assessment Type Discharge Planning Assessment   Confirmed/corrected address, phone number and insurance Yes   Confirmed Demographics Correct on Facesheet   Source of Information patient   When was your last doctors appointment? 01/04/22   Does patient/caregiver understand observation status Yes   Reason For Admission Hyperkalemia   Lives With spouse   Facility Arrived From: home   Do you expect to return to your current living situation? Yes   Do you have help at home or someone to help you manage your care at home? Yes   Who are your caregiver(s) and their phone number(s)? wife jc [phone]   Prior to hospitilization cognitive status: Alert/Oriented   Current cognitive status: Alert/Oriented   Walking or Climbing Stairs Difficulty none   Dressing/Bathing Difficulty none   Home Layout Able to live on 1st floor   Equipment Currently Used at Home none   Readmission within 30 days? No   Patient currently being followed by outpatient case management? No   Do you currently have service(s) that help you manage your care at home? No   Do you take prescription  medications? Yes   Do you have prescription coverage? Yes   Coverage Medicaid   Do you have any problems affording any of your prescribed medications? No   Is the patient taking medications as prescribed? yes   Who is going to help you get home at discharge? Pt will self drive   How do you get to doctors appointments? car, drives self   Are you on dialysis? No   Do you take coumadin? No   Discharge Plan A Home   Discharge Plan B Home with family   DME Needed Upon Discharge  none   Discharge Plan discussed with: Patient   Discharge Barriers Identified None   Relationship/Environment   Name(s) of Who Lives With Patient wife jc [phone]

## 2022-02-03 NOTE — PLAN OF CARE
SW met with pt for final assessment. Pt plans to drive self home at time of d/c. Sw has requested pt's f/u apts. Rounds completed on pt.  All questions addressed.  Bedside nurse to discuss d/c medications.  Discussed importance to attend all f/u appts and take medications as prescribed.  Verbalized understanding.    Kerwin Gill, CHELI  [phone]    Future Appointments   Date Time Provider Department Center   2/15/2022  8:45 AM LAB, [facility] [place]   2/17/2022  2:20 PM Naseem Woods MD [facility] HEM ONC [place] [facility]   4/14/2022 11:00 AM Analy Encarnacion MD [facility] [place]   6/2/2022  2:00 PM Nato Payne DO [facility]  [place] [facility]        02/03/22 1106   Final Note   Assessment Type Final Discharge Note   Anticipated Discharge Disposition Home   What phone number can be called within the next 1-3 days to see how you are doing after discharge? [phone]   Hospital Resources/Appts/Education Provided Appointments scheduled by Navigator/Coordinator   Post-Acute Status   Coverage medicaid   Discharge Delays None known at this time

## 2022-02-03 NOTE — PLAN OF CARE
Problem: Adult Inpatient Plan of Care  Goal: Plan of Care Review  Outcome: Ongoing, Progressing  Goal: Patient-Specific Goal (Individualized)  Outcome: Ongoing, Progressing  Goal: Absence of Hospital-Acquired Illness or Injury  Outcome: Ongoing, Progressing  Goal: Optimal Comfort and Wellbeing  Outcome: Ongoing, Progressing     Problem: Fluid and Electrolyte Imbalance (Acute Kidney Injury/Impairment)  Goal: Fluid and Electrolyte Balance  Outcome: Ongoing, Progressing     Problem: Renal Function Impairment (Acute Kidney Injury/Impairment)  Goal: Effective Renal Function  Outcome: Ongoing, Progressing     Problem: Electrolyte Imbalance  Goal: Electrolyte Balance  Outcome: Ongoing, Progressing     POC reviewed with patient. All questions and concerns reviewed. Fall/safety precautions implemented and maintained. No acute events noted this shift. Please see flowsheet for full assessment and vitals. Bed locked in lowest position. Call bell within reach. Will continue to monitor.

## 2022-02-03 NOTE — PROGRESS NOTES
02/02/22 1814   Admission   Initial VN Admission Questions Complete   Communication Issues? Technical Issue  (no camera in room)   Shift   Virtual Nurse - Rounding Incomplete   Virtual Nurse - Patient Verbalized Approval Of Camera Use;VN Rounding  (unable; no camera present in this room)   Safety/Activity   Safety Promotion/Fall Prevention Fall Risk reviewed with patient/family;instructed to call staff for mobility   VN spoke with patient via telephone for introduction with patient's permission.  VN role explained and informed patient that VN would be working with bedside nurse and rest of care team.  Fall risk and bed alarm protocol education provided.  Instructed patient to call for assistance.  Patient aware and agreeable.  Patient's chart, labs and vital signs reviewed.  Allowed time for questions.  No acute distress noted.  Will continue to be available as needed.

## 2022-02-03 NOTE — PLAN OF CARE
Problem: Adult Inpatient Plan of Care  Goal: Plan of Care Review  Outcome: Ongoing, Progressing   VIRTUAL NURSE:  Labs, notes, orders, and careplan reviewed.

## 2022-02-03 NOTE — PROGRESS NOTES
[facility]                    Pharmacy       Discharge Medication Education    Patient ACCEPTED medication education. Pharmacy has provided education on the name, indication, and possible side effects of the medication(s) prescribed, using teach-back method.     The following medications have also been discussed, during this admission.        Medication List        CHANGE how you take these medications      NIFEdipine 60 MG (OSM) 24 hr tablet  Commonly known as: PROCARDIA-XL  Take 1 tablet (60 mg total) by mouth once daily.  What changed: Another medication with the same name was removed. Continue taking this medication, and follow the directions you see here.            CONTINUE taking these medications      ASPIR-81 ORAL     atorvastatin 80 MG tablet  Commonly known as: LIPITOR     carvediloL 25 MG tablet  Commonly known as: COREG  Take 2 tablets (50 mg total) by mouth 2 (two) times daily with meals.     clopidogreL 75 mg tablet  Commonly known as: PLAVIX  Take 1 tablet (75 mg total) by mouth once daily.     COQ-10 100 mg capsule  Generic drug: coenzyme Q10     doxazosin 4 MG tablet  Commonly known as: CARDURA  TAKE 1 TABLET(4 MG) BY MOUTH EVERY EVENING     eplerenone 50 MG Tab  Commonly known as: INSPRA  Take 1 tablet (50 mg total) by mouth once daily.     gabapentin 300 MG capsule  Commonly known as: NEURONTIN  Take 1 capsule (300 mg total) by mouth 3 (three) times daily as needed (back pain).     hydrALAZINE 100 MG tablet  Commonly known as: APRESOLINE  Take 1 tablet (100 mg total) by mouth 2 (two) times a day.     rosuvastatin 40 MG Tab  Commonly known as: CRESTOR  Take 1 tablet (40 mg total) by mouth every evening.            STOP taking these medications      irbesartan 300 MG tablet  Commonly known as: AVAPRO               Where to Get Your Medications        These medications were sent to [id number] - [place], [place] - [address] AT [facility] OF [address]   [address] [place] [address] [address]      Phone: [phone]   NIFEdipine 60 MG (OSM) 24 hr tablet          Thank you  Gianfranco Perez, PharmD  [phone]

## 2022-02-07 NOTE — HOSPITAL COURSE
Mr. Gross presented for abnormal lab with potassium of 6.5. He was placed in observation for hyperkalemia. Emergency treatment rendered in ED and Nephrology consulted. ARB stopped. It took few days to correct potassium to normal. Later, patient also reported he intermittently drinks a lot of sport drinks which may have contributed. Education provided on renal diet. Potassium was 5.1 on day of discharge. Follow up arranged with Nephrology and ARB was not resumed on discharge.    * Hyperkalemia  Metabolic acidosis  - s/p emergent treatment in the ED with calcium gluconate, insulin/D50 and NEBs  - Held ARB which may be cause for intermittent hyperkalemia along with dietary intake (sport beverages)  - s/p bicarb, lasix and zirconium and Nephrology followed  - Potassium peaked at 7.3, but was 5.1 on discharge  - Acidosis level improved  - Patient to not take ARB on discharge and follow up with Nephrology     CKD (chronic kidney disease) stage 4, GFR 15-29 ml/min  Nephrotic syndrome  - Baseline creatinine around 2.5 and it was consistent on admission  - Held ARB as discussed above  - Nephrology consulted and workup done for nephrotic syndrome   - Creatinine was 2.2 on discharge     Atherosclerosis of native artery of both lower extremities with intermittent claudication  PAD  - Continued ASA, plavix and statin  - Appreciate Cardiology input  - Plan for outpatient revascularization as previously planned      Anemia in stage 3b chronic kidney disease  - H/H consistent with previous levels  - No monitoring needed     Coronary artery disease involving native coronary artery of native heart without angina pectoris  - No reported chest pain  - Continued ASA, plavix, atorvastatin and carvedilol     Hyperlipidemia  - Continue datorvastatin     HTN (hypertension)  - Continued nifedipine, hydralazine, doxazosin and carvedilol  - ARB on held, but BP was still well controlled  - ARB discontinued

## 2022-02-07 NOTE — DISCHARGE SUMMARY
[facility] Medicine  Discharge Summary      Patient Name: Domingo Gross  MRN: [id number]  Patient Class: OP- Observation  Admission Date: 2/1/2022  Hospital Length of Stay: 0 days  Discharge Date and Time: 2/3/2022 11:29 AM  Attending Physician: Kyra Weston MD  Discharging Provider: Kyra Weston MD  Primary Care Provider: Analy Encarnacion MD      HPI:   62 y/o male with CAD, PAD, PAF, HTN, CKD who was sent to the ER for abnormal lab with potassium of 6.5 on blood work drawn this morning. Denies chest pain, SOB, N/V, diaphoresis and is essentially without complaints. Patient has h/o bilateral SFA, GRUPO, EIA, and R CFA revascularization and is scheduled for repeat revascularization on Friday. Preop labs with high potassium but with creatinine at his baseline at 2.5.  In his recent hospitalization in the past, Nephrology recommended a renal biopsy but patient was not interested at that time. Also, noted to have hyperkalemia in the past which was corrected after stopping potassium supplements. In the ER, repeat potassium at 6.4 and bicarb of 12. Treatment given with calcium gluconate, insulin/D50 and NEBS.      * No surgery found *      Hospital Course:   Mr. Gross presented for abnormal lab with potassium of 6.5. He was placed in observation for hyperkalemia. Emergency treatment rendered in ED and Nephrology consulted. ARB stopped. It took few days to correct potassium to normal. Later, patient also reported he intermittently drinks a lot of sport drinks which may have contributed. Education provided on renal diet. Potassium was 5.1 on day of discharge. Follow up arranged with Nephrology and ARB was not resumed on discharge.    * Hyperkalemia  Metabolic acidosis  - s/p emergent treatment in the ED with calcium gluconate, insulin/D50 and NEBs  - Held ARB which may be cause for intermittent hyperkalemia along with dietary intake (sport beverages)  - s/p bicarb, lasix and zirconium and Nephrology  followed  - Potassium peaked at 7.3, but was 5.1 on discharge  - Acidosis level improved  - Patient to not take ARB on discharge and follow up with Nephrology     CKD (chronic kidney disease) stage 4, GFR 15-29 ml/min  Nephrotic syndrome  - Baseline creatinine around 2.5 and it was consistent on admission  - Held ARB as discussed above  - Nephrology consulted and workup done for nephrotic syndrome   - Creatinine was 2.2 on discharge     Atherosclerosis of native artery of both lower extremities with intermittent claudication  PAD  - Continued ASA, plavix and statin  - Appreciate Cardiology input  - Plan for outpatient revascularization as previously planned      Anemia in stage 3b chronic kidney disease  - H/H consistent with previous levels  - No monitoring needed     Coronary artery disease involving native coronary artery of native heart without angina pectoris  - No reported chest pain  - Continued ASA, plavix, atorvastatin and carvedilol     Hyperlipidemia  - Continue datorvastatin     HTN (hypertension)  - Continued nifedipine, hydralazine, doxazosin and carvedilol  - ARB on held, but BP was still well controlled  - ARB discontinued       Goals of Care Treatment Preferences:  Code Status: Full Code      Consults:   Consults (From admission, onward)        Status Ordering Provider     Inpatient consult to [facility]  Once        Provider:  (Not yet assigned)    Completed AMENA FAUST     Inpatient consult to Cardiology  Once        Provider:  (Not yet assigned)    Completed MAYI MCCAULEY        Service: Hospital Medicine    Final Active Diagnoses:    Diagnosis Date Noted POA    PRINCIPAL PROBLEM:  Hyperkalemia [E87.5] 02/01/2022 Yes    CKD (chronic kidney disease) stage 4, GFR 15-29 ml/min [N18.4] 08/20/2021 Yes    Atherosclerosis of native artery of both lower extremities with intermittent claudication [I70.213] 03/02/2018 Yes    Anemia in stage 3b chronic  kidney disease [N18.32, D63.1] 10/26/2021 Yes    Nephrotic range proteinuria [R80.9] 04/06/2020 Yes    Coronary artery disease involving native coronary artery of native heart without angina pectoris [I25.10] 03/29/2019 Yes    Hyperlipidemia [E78.5] 06/12/2015 Yes     Chronic    PAD (peripheral artery disease) [I73.9] 05/21/2014 Yes    HTN (hypertension) [I10] 04/29/2013 Yes      Problems Resolved During this Admission:       Discharged Condition: good    Disposition: Home or Self Care    Follow Up:   Follow-up Information     Analy Encarnacion MD In 1 week.    Specialty: Internal Medicine  Contact information:  [address] [facility]  [place] [address]  [phone]             Loly Payne MD In 2 weeks.    Specialty: Nephrology  Contact information:  [address]  [address]  [facility]  [place] [address]  [phone]             Keo Jenkins MD.    Specialties: Interventional Cardiology, Cardiology  Why: Clinic to contact patient  Contact information:  [address]  [address]  [place] [address]  [phone]                       Patient Instructions:      Diet Cardiac     Diet renal     Activity as tolerated       Significant Diagnostic Studies:    Pending Diagnostic Studies:     None         Medications:  Reconciled Home Medications:      Medication List      CHANGE how you take these medications    NIFEdipine 60 MG (OSM) 24 hr tablet  Commonly known as: PROCARDIA-XL  Take 1 tablet (60 mg total) by mouth once daily.  What changed: Another medication with the same name was removed. Continue taking this medication, and follow the directions you see here.        CONTINUE taking these medications    ASPIR-81 ORAL  Take 81 mg by mouth once daily.     atorvastatin 80 MG tablet  Commonly known as: LIPITOR  Take 80 mg by mouth once daily.     carvediloL 25 MG tablet  Commonly known as: COREG  Take 2 tablets (50 mg total) by mouth 2 (two) times daily with meals.     clopidogreL 75 mg  tablet  Commonly known as: PLAVIX  Take 1 tablet (75 mg total) by mouth once daily.     COQ-10 100 mg capsule  Generic drug: coenzyme Q10  Take 100 mg by mouth once daily.     doxazosin 4 MG tablet  Commonly known as: CARDURA  TAKE 1 TABLET(4 MG) BY MOUTH EVERY EVENING     eplerenone 50 MG Tab  Commonly known as: INSPRA  Take 1 tablet (50 mg total) by mouth once daily.     gabapentin 300 MG capsule  Commonly known as: NEURONTIN  Take 1 capsule (300 mg total) by mouth 3 (three) times daily as needed (back pain).     hydrALAZINE 100 MG tablet  Commonly known as: APRESOLINE  Take 1 tablet (100 mg total) by mouth 2 (two) times a day.     rosuvastatin 40 MG Tab  Commonly known as: CRESTOR  Take 1 tablet (40 mg total) by mouth every evening.        STOP taking these medications    irbesartan 300 MG tablet  Commonly known as: AVAPRO            Indwelling Lines/Drains at time of discharge:   Lines/Drains/Airways     None                 Time spent on the discharge of patient: 35 minutes         Kyra Weston MD  Department of [facility] Medicine  [facility]

## 2022-02-10 ENCOUNTER — PATIENT MESSAGE (OUTPATIENT)
Dept: CARDIOLOGY | Facility: CLINIC | Age: 62
End: 2022-02-10
Payer: MEDICAID

## 2022-02-10 NOTE — TELEPHONE ENCOUNTER
Reached out in regards to medication clarification.    Verified with [facility] pharmacy the prescription sent by ED is the same medication that he is currently taking.     No changes were made     Verbalized udnerstanding

## 2022-02-14 ENCOUNTER — PATIENT MESSAGE (OUTPATIENT)
Dept: CARDIOLOGY | Facility: CLINIC | Age: 62
End: 2022-02-14
Payer: MEDICAID

## 2022-02-15 ENCOUNTER — PATIENT OUTREACH (OUTPATIENT)
Dept: ADMINISTRATIVE | Facility: OTHER | Age: 62
End: 2022-02-15
Payer: MEDICAID

## 2022-02-15 ENCOUNTER — LAB VISIT (OUTPATIENT)
Dept: LAB | Facility: HOSPITAL | Age: 62
End: 2022-02-15
Attending: INTERNAL MEDICINE
Payer: MEDICAID

## 2022-02-15 DIAGNOSIS — N18.32 ANEMIA IN STAGE 3B CHRONIC KIDNEY DISEASE: ICD-10-CM

## 2022-02-15 DIAGNOSIS — D63.1 ANEMIA IN STAGE 3B CHRONIC KIDNEY DISEASE: ICD-10-CM

## 2022-02-15 LAB
ALBUMIN SERPL BCP-MCNC: 3.4 G/DL (ref 3.5–5.2)
ALP SERPL-CCNC: 59 U/L (ref 55–135)
ALT SERPL W/O P-5'-P-CCNC: 15 U/L (ref 10–44)
ANION GAP SERPL CALC-SCNC: 11 MMOL/L (ref 8–16)
AST SERPL-CCNC: 16 U/L (ref 10–40)
BASOPHILS # BLD AUTO: 0.05 K/UL (ref 0–0.2)
BASOPHILS NFR BLD: 0.8 % (ref 0–1.9)
BILIRUB SERPL-MCNC: 0.4 MG/DL (ref 0.1–1)
BUN SERPL-MCNC: 36 MG/DL (ref 6–20)
CALCIUM SERPL-MCNC: 9 MG/DL (ref 8.7–10.5)
CHLORIDE SERPL-SCNC: 107 MMOL/L (ref 95–110)
CO2 SERPL-SCNC: 19 MMOL/L (ref 23–29)
CREAT SERPL-MCNC: 2.1 MG/DL (ref 0.5–1.4)
DIFFERENTIAL METHOD: ABNORMAL
EOSINOPHIL # BLD AUTO: 0.2 K/UL (ref 0–0.5)
EOSINOPHIL NFR BLD: 3.2 % (ref 0–8)
ERYTHROCYTE [DISTWIDTH] IN BLOOD BY AUTOMATED COUNT: 12.6 % (ref 11.5–14.5)
EST. GFR  (AFRICAN AMERICAN): 38.4 ML/MIN/1.73 M^2
EST. GFR  (NON AFRICAN AMERICAN): 33.2 ML/MIN/1.73 M^2
FERRITIN SERPL-MCNC: 256 NG/ML (ref 20–300)
GLUCOSE SERPL-MCNC: 149 MG/DL (ref 70–110)
HCT VFR BLD AUTO: 29.5 % (ref 40–54)
HGB BLD-MCNC: 9.3 G/DL (ref 14–18)
IMM GRANULOCYTES # BLD AUTO: 0.01 K/UL (ref 0–0.04)
IMM GRANULOCYTES NFR BLD AUTO: 0.2 % (ref 0–0.5)
IRON SERPL-MCNC: 87 UG/DL (ref 45–160)
LYMPHOCYTES # BLD AUTO: 1.5 K/UL (ref 1–4.8)
LYMPHOCYTES NFR BLD: 23.2 % (ref 18–48)
MCH RBC QN AUTO: 28.7 PG (ref 27–31)
MCHC RBC AUTO-ENTMCNC: 31.5 G/DL (ref 32–36)
MCV RBC AUTO: 91 FL (ref 82–98)
MONOCYTES # BLD AUTO: 0.6 K/UL (ref 0.3–1)
MONOCYTES NFR BLD: 9 % (ref 4–15)
NEUTROPHILS # BLD AUTO: 4.2 K/UL (ref 1.8–7.7)
NEUTROPHILS NFR BLD: 63.6 % (ref 38–73)
NRBC BLD-RTO: 0 /100 WBC
PLATELET # BLD AUTO: 162 K/UL (ref 150–450)
PMV BLD AUTO: 10.9 FL (ref 9.2–12.9)
POTASSIUM SERPL-SCNC: 4.2 MMOL/L (ref 3.5–5.1)
PROT SERPL-MCNC: 6.4 G/DL (ref 6–8.4)
RBC # BLD AUTO: 3.24 M/UL (ref 4.6–6.2)
SATURATED IRON: 33 % (ref 20–50)
SODIUM SERPL-SCNC: 137 MMOL/L (ref 136–145)
TOTAL IRON BINDING CAPACITY: 266 UG/DL (ref 250–450)
TRANSFERRIN SERPL-MCNC: 180 MG/DL (ref 200–375)
WBC # BLD AUTO: 6.52 K/UL (ref 3.9–12.7)

## 2022-02-15 PROCEDURE — 80053 COMPREHEN METABOLIC PANEL: CPT | Performed by: INTERNAL MEDICINE

## 2022-02-15 PROCEDURE — 84466 ASSAY OF TRANSFERRIN: CPT | Performed by: INTERNAL MEDICINE

## 2022-02-15 PROCEDURE — 85025 COMPLETE CBC W/AUTO DIFF WBC: CPT | Performed by: INTERNAL MEDICINE

## 2022-02-15 PROCEDURE — 82728 ASSAY OF FERRITIN: CPT | Performed by: INTERNAL MEDICINE

## 2022-02-15 PROCEDURE — 36415 COLL VENOUS BLD VENIPUNCTURE: CPT | Mod: PO | Performed by: INTERNAL MEDICINE

## 2022-02-16 ENCOUNTER — OFFICE VISIT (OUTPATIENT)
Dept: CARDIOLOGY | Facility: CLINIC | Age: 62
End: 2022-02-16
Payer: MEDICAID

## 2022-02-16 ENCOUNTER — OFFICE VISIT (OUTPATIENT)
Dept: INTERNAL MEDICINE | Facility: CLINIC | Age: 62
End: 2022-02-16
Payer: MEDICAID

## 2022-02-16 VITALS
WEIGHT: 180.13 LBS | HEART RATE: 80 BPM | BODY MASS INDEX: 25.79 KG/M2 | DIASTOLIC BLOOD PRESSURE: 50 MMHG | HEIGHT: 70 IN | OXYGEN SATURATION: 97 % | SYSTOLIC BLOOD PRESSURE: 130 MMHG

## 2022-02-16 VITALS
BODY MASS INDEX: 25.31 KG/M2 | DIASTOLIC BLOOD PRESSURE: 67 MMHG | SYSTOLIC BLOOD PRESSURE: 111 MMHG | OXYGEN SATURATION: 97 % | HEART RATE: 81 BPM | WEIGHT: 176.38 LBS

## 2022-02-16 DIAGNOSIS — E78.2 MIXED HYPERLIPIDEMIA: Chronic | ICD-10-CM

## 2022-02-16 DIAGNOSIS — N18.4 ANEMIA DUE TO STAGE 4 CHRONIC KIDNEY DISEASE: ICD-10-CM

## 2022-02-16 DIAGNOSIS — R80.9 NEPHROTIC RANGE PROTEINURIA: ICD-10-CM

## 2022-02-16 DIAGNOSIS — I87.2 VENOUS INSUFFICIENCY OF BOTH LOWER EXTREMITIES: ICD-10-CM

## 2022-02-16 DIAGNOSIS — I70.213 ATHEROSCLEROSIS OF NATIVE ARTERY OF BOTH LOWER EXTREMITIES WITH INTERMITTENT CLAUDICATION: Primary | ICD-10-CM

## 2022-02-16 DIAGNOSIS — N18.4 CKD (CHRONIC KIDNEY DISEASE) STAGE 4, GFR 15-29 ML/MIN: ICD-10-CM

## 2022-02-16 DIAGNOSIS — I73.9 PAD (PERIPHERAL ARTERY DISEASE): ICD-10-CM

## 2022-02-16 DIAGNOSIS — I25.10 CORONARY ARTERY DISEASE INVOLVING NATIVE CORONARY ARTERY OF NATIVE HEART WITHOUT ANGINA PECTORIS: ICD-10-CM

## 2022-02-16 DIAGNOSIS — Z09 HOSPITAL DISCHARGE FOLLOW-UP: ICD-10-CM

## 2022-02-16 DIAGNOSIS — D50.9 IRON DEFICIENCY ANEMIA, UNSPECIFIED IRON DEFICIENCY ANEMIA TYPE: ICD-10-CM

## 2022-02-16 DIAGNOSIS — J44.9 CHRONIC OBSTRUCTIVE PULMONARY DISEASE, UNSPECIFIED COPD TYPE: Chronic | ICD-10-CM

## 2022-02-16 DIAGNOSIS — I10 PRIMARY HYPERTENSION: ICD-10-CM

## 2022-02-16 DIAGNOSIS — D63.1 ANEMIA DUE TO STAGE 4 CHRONIC KIDNEY DISEASE: ICD-10-CM

## 2022-02-16 DIAGNOSIS — E87.5 HYPERKALEMIA: ICD-10-CM

## 2022-02-16 PROCEDURE — 3074F PR MOST RECENT SYSTOLIC BLOOD PRESSURE < 130 MM HG: ICD-10-PCS | Mod: CPTII,,, | Performed by: INTERNAL MEDICINE

## 2022-02-16 PROCEDURE — 3075F PR MOST RECENT SYSTOLIC BLOOD PRESS GE 130-139MM HG: ICD-10-PCS | Mod: CPTII,,, | Performed by: INTERNAL MEDICINE

## 2022-02-16 PROCEDURE — 3078F PR MOST RECENT DIASTOLIC BLOOD PRESSURE < 80 MM HG: ICD-10-PCS | Mod: CPTII,,, | Performed by: INTERNAL MEDICINE

## 2022-02-16 PROCEDURE — 99214 PR OFFICE/OUTPT VISIT, EST, LEVL IV, 30-39 MIN: ICD-10-PCS | Mod: S$PBB,,, | Performed by: INTERNAL MEDICINE

## 2022-02-16 PROCEDURE — 3078F DIAST BP <80 MM HG: CPT | Mod: CPTII,,, | Performed by: INTERNAL MEDICINE

## 2022-02-16 PROCEDURE — 3008F BODY MASS INDEX DOCD: CPT | Mod: CPTII,,, | Performed by: INTERNAL MEDICINE

## 2022-02-16 PROCEDURE — 99999 PR PBB SHADOW E&M-EST. PATIENT-LVL III: ICD-10-PCS | Mod: PBBFAC,,, | Performed by: INTERNAL MEDICINE

## 2022-02-16 PROCEDURE — 1159F MED LIST DOCD IN RCRD: CPT | Mod: CPTII,,, | Performed by: INTERNAL MEDICINE

## 2022-02-16 PROCEDURE — 1159F PR MEDICATION LIST DOCUMENTED IN MEDICAL RECORD: ICD-10-PCS | Mod: CPTII,,, | Performed by: INTERNAL MEDICINE

## 2022-02-16 PROCEDURE — 3074F SYST BP LT 130 MM HG: CPT | Mod: CPTII,,, | Performed by: INTERNAL MEDICINE

## 2022-02-16 PROCEDURE — 3008F PR BODY MASS INDEX (BMI) DOCUMENTED: ICD-10-PCS | Mod: CPTII,,, | Performed by: INTERNAL MEDICINE

## 2022-02-16 PROCEDURE — 99214 OFFICE O/P EST MOD 30 MIN: CPT | Mod: S$PBB,,, | Performed by: INTERNAL MEDICINE

## 2022-02-16 PROCEDURE — 99213 OFFICE O/P EST LOW 20 MIN: CPT | Mod: PBBFAC,27,PO | Performed by: INTERNAL MEDICINE

## 2022-02-16 PROCEDURE — 3075F SYST BP GE 130 - 139MM HG: CPT | Mod: CPTII,,, | Performed by: INTERNAL MEDICINE

## 2022-02-16 PROCEDURE — 99213 OFFICE O/P EST LOW 20 MIN: CPT | Mod: PBBFAC,PO | Performed by: INTERNAL MEDICINE

## 2022-02-16 PROCEDURE — 99999 PR PBB SHADOW E&M-EST. PATIENT-LVL III: CPT | Mod: PBBFAC,,, | Performed by: INTERNAL MEDICINE

## 2022-02-16 NOTE — PROGRESS NOTES
Patient ID: Domingo Gross is a 61 y.o. male.    Chief Complaint: Follow-up    MEJIA Chirinos is a 61 y.o. male with chronic anemia, CKD stage 4, CAD, PAD, HTN, HLD, COPD, history of cardiac arrest who presents for hospital discharge follow-up.  Patient did routine labs in anticipation of stent placement for peripheral arterial disease.  Labs revealed elevated potassium of 6.5. He was admitted to hospital and treated for hyperkalemia. Nephrology was consulted for this issue and for CKD stage 4. His ARB was stopped. He was also drinking 2-3 gatorades per day and he has stopped this. Still on eplerenone. BP well controlled in clinic today. He has no acute complaints or concerns today.  He is feeling well.    He has a follow-up appointment with Nephrology in June.  He will see  in cardiology again soon and they still plan for stents.  He follows with Hematology/Oncology for anemia and low iron.  He has received iron infusions.  He is not being administered any iron at this time.  We reviewed blood counts from his hospitalization today.  He had to reschedule his Dermatology appointment for suspected skin cancer. Has redness of both eyes. Says it is not painful and he follows with an eye doctor.    Review of Systems   All other systems reviewed and are negative.      Objective:     Vitals:    02/16/22 0718   BP: (!) 130/50   Pulse: 80        Physical Exam  Vitals reviewed.   Constitutional:       General: He is not in acute distress.     Appearance: Normal appearance. He is well-developed. He is not ill-appearing, toxic-appearing or diaphoretic.   HENT:      Head: Normocephalic and atraumatic.      Right Ear: External ear normal.      Left Ear: External ear normal.      Nose: Nose normal.   Eyes:      Extraocular Movements: Extraocular movements intact.      Conjunctiva/sclera: Conjunctivae normal.   Cardiovascular:      Rate and Rhythm: Normal rate and regular rhythm.      Pulses: Normal pulses.      Heart  sounds: Normal heart sounds. No murmur heard.  No friction rub. No gallop.    Pulmonary:      Effort: Pulmonary effort is normal. No respiratory distress.      Breath sounds: No stridor. No wheezing, rhonchi or rales.      Comments: Diminished breath sounds-diffuse and bilateral   Musculoskeletal:      Right lower leg: No edema.      Left lower leg: No edema.   Skin:     General: Skin is warm and dry.   Neurological:      General: No focal deficit present.      Mental Status: He is alert and oriented to person, place, and time. Mental status is at baseline.   Psychiatric:         Mood and Affect: Mood normal.         Behavior: Behavior normal.         Thought Content: Thought content normal.         Judgment: Judgment normal.         Assessment:       1. Hospital discharge follow-up    2. Hyperkalemia Inactive   3. Iron deficiency anemia, unspecified iron deficiency anemia type Well controlled   4. Chronic obstructive pulmonary disease, unspecified COPD type Well controlled   5. CKD (chronic kidney disease) stage 4, GFR 15-29 ml/min Chronic   6. Mixed hyperlipidemia Well controlled   7. Primary hypertension Well controlled   8. PAD (peripheral artery disease) Chronic   9. Nephrotic range proteinuria Chronic       Plan:         Hospital discharge follow-up    Hyperkalemia  Comments:  Currently resolved. Will continue to monitor   Orders:  -     Basic Metabolic Panel; Future; Expected date: 03/16/2022    Iron deficiency anemia, unspecified iron deficiency anemia type  Comments:  Currently resolved. Continue to monitor. Followed by Heme-Onc  Orders:  -     CBC Auto Differential; Future; Expected date: 03/16/2022    Chronic obstructive pulmonary disease, unspecified COPD type    CKD (chronic kidney disease) stage 4, GFR 15-29 ml/min  Comments:  Has upcoming appt with nephrology in June    Mixed hyperlipidemia  Comments:  Cont current medication    Primary hypertension  Comments:  Cont current medication    PAD  (peripheral artery disease)  Comments:  Management per Cardiology. Continue aspirin and statin    Nephrotic range proteinuria  Comments:  Will see nephrology as outpatient to follow up on this      Labs in 1-2 months  RTC 6 months and PRN      Warning signs discussed, patient to call with any further issues or worsening of symptoms.       Parts of the above note were dictated using a voice dictation software. Please excuse any grammatical or typographical errors.

## 2022-02-17 ENCOUNTER — TELEPHONE (OUTPATIENT)
Dept: HEMATOLOGY/ONCOLOGY | Facility: CLINIC | Age: 62
End: 2022-02-17

## 2022-02-17 ENCOUNTER — OFFICE VISIT (OUTPATIENT)
Dept: HEMATOLOGY/ONCOLOGY | Facility: CLINIC | Age: 62
End: 2022-02-17
Payer: MEDICAID

## 2022-02-17 ENCOUNTER — TELEPHONE (OUTPATIENT)
Dept: INFUSION THERAPY | Facility: HOSPITAL | Age: 62
End: 2022-02-17
Payer: MEDICAID

## 2022-02-17 VITALS
DIASTOLIC BLOOD PRESSURE: 66 MMHG | HEIGHT: 70 IN | BODY MASS INDEX: 25.21 KG/M2 | RESPIRATION RATE: 20 BRPM | OXYGEN SATURATION: 98 % | HEART RATE: 74 BPM | TEMPERATURE: 98 F | SYSTOLIC BLOOD PRESSURE: 115 MMHG | WEIGHT: 176.13 LBS

## 2022-02-17 DIAGNOSIS — N18.4 ANEMIA DUE TO STAGE 4 CHRONIC KIDNEY DISEASE: ICD-10-CM

## 2022-02-17 DIAGNOSIS — D63.1 ANEMIA IN STAGE 3B CHRONIC KIDNEY DISEASE: Primary | ICD-10-CM

## 2022-02-17 DIAGNOSIS — D63.1 ANEMIA DUE TO STAGE 4 CHRONIC KIDNEY DISEASE: ICD-10-CM

## 2022-02-17 DIAGNOSIS — N18.4 CKD (CHRONIC KIDNEY DISEASE) STAGE 4, GFR 15-29 ML/MIN: ICD-10-CM

## 2022-02-17 DIAGNOSIS — N18.32 ANEMIA IN STAGE 3B CHRONIC KIDNEY DISEASE: Primary | ICD-10-CM

## 2022-02-17 DIAGNOSIS — I73.9 PAD (PERIPHERAL ARTERY DISEASE): ICD-10-CM

## 2022-02-17 PROCEDURE — 3008F BODY MASS INDEX DOCD: CPT | Mod: CPTII,,, | Performed by: INTERNAL MEDICINE

## 2022-02-17 PROCEDURE — 1160F RVW MEDS BY RX/DR IN RCRD: CPT | Mod: CPTII,,, | Performed by: INTERNAL MEDICINE

## 2022-02-17 PROCEDURE — 99213 OFFICE O/P EST LOW 20 MIN: CPT | Mod: PBBFAC,PO | Performed by: INTERNAL MEDICINE

## 2022-02-17 PROCEDURE — 99214 OFFICE O/P EST MOD 30 MIN: CPT | Mod: S$PBB,,, | Performed by: INTERNAL MEDICINE

## 2022-02-17 PROCEDURE — 3078F DIAST BP <80 MM HG: CPT | Mod: CPTII,,, | Performed by: INTERNAL MEDICINE

## 2022-02-17 PROCEDURE — 99999 PR PBB SHADOW E&M-EST. PATIENT-LVL III: ICD-10-PCS | Mod: PBBFAC,,, | Performed by: INTERNAL MEDICINE

## 2022-02-17 PROCEDURE — 3078F PR MOST RECENT DIASTOLIC BLOOD PRESSURE < 80 MM HG: ICD-10-PCS | Mod: CPTII,,, | Performed by: INTERNAL MEDICINE

## 2022-02-17 PROCEDURE — 3008F PR BODY MASS INDEX (BMI) DOCUMENTED: ICD-10-PCS | Mod: CPTII,,, | Performed by: INTERNAL MEDICINE

## 2022-02-17 PROCEDURE — 99999 PR PBB SHADOW E&M-EST. PATIENT-LVL III: CPT | Mod: PBBFAC,,, | Performed by: INTERNAL MEDICINE

## 2022-02-17 PROCEDURE — 1159F PR MEDICATION LIST DOCUMENTED IN MEDICAL RECORD: ICD-10-PCS | Mod: CPTII,,, | Performed by: INTERNAL MEDICINE

## 2022-02-17 PROCEDURE — 3074F SYST BP LT 130 MM HG: CPT | Mod: CPTII,,, | Performed by: INTERNAL MEDICINE

## 2022-02-17 PROCEDURE — 3074F PR MOST RECENT SYSTOLIC BLOOD PRESSURE < 130 MM HG: ICD-10-PCS | Mod: CPTII,,, | Performed by: INTERNAL MEDICINE

## 2022-02-17 PROCEDURE — 99214 PR OFFICE/OUTPT VISIT, EST, LEVL IV, 30-39 MIN: ICD-10-PCS | Mod: S$PBB,,, | Performed by: INTERNAL MEDICINE

## 2022-02-17 PROCEDURE — 1160F PR REVIEW ALL MEDS BY PRESCRIBER/CLIN PHARMACIST DOCUMENTED: ICD-10-PCS | Mod: CPTII,,, | Performed by: INTERNAL MEDICINE

## 2022-02-17 PROCEDURE — 1159F MED LIST DOCD IN RCRD: CPT | Mod: CPTII,,, | Performed by: INTERNAL MEDICINE

## 2022-02-17 NOTE — PROGRESS NOTES
Subjective:   Patient ID:  Domingo Gross is a 61 y.o. male who presents for follow up of Peripheral Arterial Disease, Coronary Artery Disease, Claudication, Hyperlipidemia, and Hypertension      HPI:              Domingo Gross 61 y.o. male is here follow up CAD, HTN, HLP, PAF in NSR, edema, and PAD with winsome IIb claudication. He is s/p bilateral SFA, GRUPO, EIA, and R CFA revascularization. He has seen nephrology and later recommended a renal biopsy but he was reasonably reluctant to stop DAPT because of his history of cardiac arrest. His peripheral vascular intervention in 12/2021 was cancelled due to severe anemia with a H/H that required a transfusion with subsequent ongoing intervention by heme/onc. He has a GI work up for anemia was normal. His H/H has improved with heme/onc care.        He was admitted 10/4/2019 for cardiac arrest. He was fully rescued. Initial rhythm was afib with rvr. He was not taken immediately to the cath lab because of ARF + hypokalemia. He had a diagnostic angiogram which revealed patent LM, LAD, RCA stent, and new ostial LCX lesion 99% with R to L collaterals. He had PCI with REZA after his renal function returned to baseline.         8/19/2021 with CP HTN urgency with . Added nifedipine and BP has been stable. No CP        He was admitted on 2/1/2022. His peripheral revascularization procedure was cancelled because of hyperkalemia and denial by insurance company requiring a peer to peer. Both issues have been resolved. He is ready to proceed with revascularization.         ELISABETH with stress 5/2017:   R 1.01 to 0.44   L 0.92 to 0.45    After 6 minutes ambulation      CTA 5/2017:       Patent distal aorta and bilateral GRUPO/EIA stents   L EIA with de shawna 90% stenosis   L SFA 80% with 3 vessel run off     R EIA/CFA with moderate disease   R SFA 80% stenosis with 3 vessel run off        Peripheral angiogram with intervention 6/2017         Patent bilateral GRUPO/EIA  stents.    De shawna 80% left EIA stenosis treated with 9.0 x 80 mm Lifestar  stent post dilated with 8.0 mm balloon.    Bilateral 70-80% SFA stenosis with 3 vessel run off.        3/2018      L SFA intervention for claudication   75% L SFA with 3 vessel run off   7 mmHg resting and 33 mmHg hyperemic mean gradient         L CFA antegrade access   PTA with 6.0 x 150 mm   PTA with 6.0 x 150 mm Lutonix   3 vessel run off           4/13/2018       S/p R SFA PTA for winsome IIb claudication   R CFA antegrade access         R CFA with 50% stenosis-heavily calcified lesion               Baseline /90         R SFA with 70% stenosis with a significant resting and hyperemic mean gradient     3 vessel run off             PTA of R SFA with 6.0 x 150 + 6.0 x 60 mm Lutonix DCB        5/2/2018   Patent arteries post revascularization        2/2019     R 0.84 to 0.27   L 0.90 to 0.66   After ambulation   Severe R calf claudication          Nuclear stress test 2/2019     + ECG with 2 mm ST depression   No wall motion abnormalities   Test stopped at 6 minutes, 7 METs, 86% predicted heart rate   Stopped because of R leg claudication + ECG changes          S/p PCI RCA and LAD with REZA 3/2019       RCA 3.5 x 22 mm Resolute REZA   LAD 2.5 x 30 and 2.5 x 25 mm Resolute REZA      Peripheral aortogram 5/10/2019     R CFA 95% stenosis   3 vessel run off        Seen by Dr. Giron for R CFA endarterectomy but preferable should be off plavix for at least 5 days. He has a risk for stent thrombosis with premature interruption of DAPT. He later had R CFA atherectomy + PTA with DCB.           10/11/2019 for cardiac arrest        S/p staged LCX PCI                    L CFA access              IVUS guided PCI              3.0 x 15 mm Resolute REZA post dilated with 3.5 NC balloon         PET stress 2/2020     No ischemia      Echo 2/2021      EF 55%   Normal diastolic fn   Normal RV fn   Mild MR/TR/WA         Arterial US 7/2021     Bilateral SFA  disease > 70      Venous US 7/2021     No DVT   No superficial reflux   R POP vein reflux > 500 msec    Echo 8/2021      Normal EF   Normal RV fn   Mild-mod MR   Mod WA   PASP 26 mmHg        ELISABETH 11/2021      Resting ELISABETH right 0.84 and left 0.83   Exercise ELISABETH right 0.39 and left 0.32 after 3.5 minutes ambulation   TBI right 0.58 and left 0.48          US 11/2021      Bilateral mid SFA high grade disease        Patient Active Problem List    Diagnosis Date Noted    Cardiac arrest 10/04/2019     Priority: High    Atherosclerosis of native artery of both lower extremities with intermittent claudication 03/02/2018     Priority: High    Atherosclerosis of native artery of right lower extremity with intermittent claudication 04/13/2018     Priority: Low    Hyperkalemia 02/01/2022    Anemia due to stage 4 chronic kidney disease 12/15/2021    Anemia in stage 3b chronic kidney disease 10/26/2021    Anemia 08/20/2021    CKD (chronic kidney disease) stage 4, GFR 15-29 ml/min 08/20/2021    COPD (chronic obstructive pulmonary disease) 08/20/2021    Vitamin D deficiency 10/03/2020    Nuclear sclerotic cataract of left eye 07/02/2020    Nuclear sclerosis, bilateral 06/08/2020    Nephrotic range proteinuria 04/06/2020    Hypertensive kidney disease with stage 3 chronic kidney disease 04/06/2020    Atrial fibrillation with RVR 10/04/2019    Acute renal failure 10/04/2019    Bilateral carotid artery stenosis     Coronary artery disease involving native coronary artery of native heart without angina pectoris 03/29/2019         3/29/2019    S/p LHC via R radial           LM, LAD, and LCX are patent with luminal irregularities  RCA mid 95% calcified lesion  Normal EF with LVEDP 8 mmHg           PCI of mid LAD with 2.5 x 30 and 2.5 x 15 mm Resolute REZA  PCI of proximal RCA to treat guide dissection with 3.5 x 22 Resolute REZA        10/11/2019 for cardiac arrest        S/p staged LCX PCI                    L CFA access               IVUS guided PCI              3.0 x 15 mm Resolute REZA post dilated with 3.5 NC balloon           Abnormal cardiovascular stress test 02/27/2019         Nuclear stress test 2/2019     + ECG with 2 mm ST depression   No wall motion abnormalities   Test stopped at 6 minutes, 7 METs, 86% predicted heart rate   Stopped because of R leg claudication + ECG changes            Venous insufficiency of both lower extremities 06/04/2018    Localized edema 06/04/2018    Left knee pain 01/19/2016    Pain of left lower extremity 01/19/2016    Acute pain of left knee 12/11/2015    Hyperlipidemia 06/12/2015    DJD (degenerative joint disease), lumbar 05/27/2015    Lumbar facet arthropathy 05/27/2015    DDD (degenerative disc disease) 05/27/2015    Spondylosis without myelopathy 05/27/2015    Limb pain 11/21/2014    History of back injury 11/21/2014     20 years ago a bag fell on his back at work      Myalgia 11/21/2014    Claudication in peripheral vascular disease 06/13/2014    PAD (peripheral artery disease) 05/21/2014 6/13/2014      1. Three vessel runoff below the knee bilaterally.  2. Severe disease of the terminal aorta.  3. Successful PTAS.  4. Bilateral common iliac reconstruction with 8 x 39 mm stent extending in the aorta  5. Right common illiac was treated with an overlapping 9 x 60 mm SES   6. Left common iliac was treated with an overlapping 8 x 80 mm SES down to external iliac  7. All stents were post dilated with 9.0 x 60 mm balloons  8. Moderate bilateral SFA disease  9. Chronically occluded left internal iliac artery        6/12/2015      1. 80% mid left SFA with a 20 mmHg resting mean gradient  2. Atherectomy with 2.2 Phonenix Volcano catheter  3. PTA with 6.0 x 100 mm Lutonix drug coated balloon        6/9/2017      Patent bilateral GRUPO/EIA stents  L EIA PTAS 9.0 x 80 mm Life stent post dilated with 8.0 mm balloon  Bilateral 70-80% SFA stenosis with 3 vessel run  off          3/2018      L SFA intervention for claudication   75% L SFA with 3 vessel run off   7 mmHg resting and 33 mmHg hyperemic mean gradient         L CFA antegrade access   PTA with 6.0 x 150 mm   PTA with 6.0 x 150 mm Lutonix   3 vessel run off           2018       S/p R SFA PTA for winsome IIb claudication   R CFA antegrade access         R CFA with 50% stenosis-heavily calcified lesion               Baseline /90         R SFA with 70% stenosis with a significant resting and hyperemic mean gradient     3 vessel run off             PTA of R SFA with 6.0 x 150 + 6.0 x 60 mm Lutonix DCB        2018   Patent arteries post revascularization        2019     R 0.84 to 0.27   L 0.90 to 0.66   After ambulation   Severe R calf claudication        Peripheral angiogram 5/10/2019                   95% R CFA stenosis; heavily calcified    Patent SFA, POP + 3 vessel run off      Peripheral intervention 2019    S/p right CFA revascularization for winsome IIb claudication       Assisted JOSY approach   L radial access for visualization   5-6 slender R PT access for delivery of therapy          Atherectomy with SilverHawk catheter   PTA with 7.0 x 40 mm Lutonix DCB            Atherosclerosis of leg with intermittent claudication 2014     Winsome IIB      Elevated TSH 2013    HTN (hypertension) 2013    Elevated fasting blood sugar 2013           Right Arm BP - Sittin/67  Left Arm BP - Sittin/67          LAST HbA1c  Lab Results   Component Value Date    HGBA1C 5.7 (H) 10/28/2019       Lipid panel  Lab Results   Component Value Date    CHOL 105 (L) 2021    CHOL 162 03/15/2021    CHOL 211 (H) 2019     Lab Results   Component Value Date    HDL 44 2021    HDL 43 03/15/2021    HDL 53 2019     Lab Results   Component Value Date    LDLCALC 51.0 (L) 2021    LDLCALC 98.0 03/15/2021    LDLCALC 123.8 2019     Lab Results   Component  Value Date    TRIG 50 08/20/2021    TRIG 105 03/15/2021    TRIG 171 (H) 11/25/2019     Lab Results   Component Value Date    CHOLHDL 41.9 08/20/2021    CHOLHDL 26.5 03/15/2021    CHOLHDL 25.1 11/25/2019            Review of Systems   Constitutional: Negative for diaphoresis, night sweats, weight gain and weight loss.   HENT: Negative for congestion.    Eyes: Negative for blurred vision, discharge and double vision.   Cardiovascular: Negative for chest pain, claudication, cyanosis, dyspnea on exertion, irregular heartbeat, leg swelling, near-syncope, orthopnea, palpitations, paroxysmal nocturnal dyspnea and syncope.   Respiratory: Negative for cough, shortness of breath and wheezing.    Endocrine: Negative for cold intolerance, heat intolerance and polyphagia.   Hematologic/Lymphatic: Negative for adenopathy and bleeding problem. Does not bruise/bleed easily.   Skin: Negative for dry skin and nail changes.   Musculoskeletal: Negative for arthritis, back pain, falls, joint pain, myalgias and neck pain.   Gastrointestinal: Negative for bloating, abdominal pain, change in bowel habit and constipation.   Genitourinary: Negative for bladder incontinence, dysuria, flank pain, genital sores and missed menses.   Neurological: Negative for aphonia, brief paralysis, difficulty with concentration, dizziness and weakness.   Psychiatric/Behavioral: Negative for altered mental status and memory loss. The patient does not have insomnia.    Allergic/Immunologic: Negative for environmental allergies.       Objective:   Physical Exam  Constitutional:       Appearance: He is well-developed.      Interventions: He is not intubated.  HENT:      Head: Normocephalic and atraumatic.      Right Ear: External ear normal.      Left Ear: External ear normal.   Eyes:      General: No scleral icterus.        Right eye: No discharge.         Left eye: No discharge.      Conjunctiva/sclera: Conjunctivae normal.      Pupils: Pupils are equal, round,  and reactive to light.   Neck:      Thyroid: No thyromegaly.      Vascular: Normal carotid pulses. No carotid bruit, hepatojugular reflux or JVD.      Trachea: No tracheal deviation.   Cardiovascular:      Rate and Rhythm: Normal rate and regular rhythm.  No extrasystoles are present.     Chest Wall: PMI is not displaced.      Pulses: No midsystolic click.           Carotid pulses are 2+ on the right side and 2+ on the left side.       Radial pulses are 2+ on the right side and 2+ on the left side.        Femoral pulses are 2+ on the right side and 2+ on the left side.       Popliteal pulses are 2+ on the right side and 2+ on the left side.        Dorsalis pedis pulses are 1+ on the right side and 2+ on the left side.        Posterior tibial pulses are 1+ on the right side and 2+ on the left side.      Heart sounds: S1 normal and S2 normal. Heart sounds not distant. No murmur heard.  No friction rub. No gallop. No S3 sounds.       Comments:       Biphasic R DP and weak but biphasic R PT doppler signals       Biphasic L DP and PT doppler signals          Pulmonary:      Effort: Pulmonary effort is normal. No tachypnea, bradypnea, accessory muscle usage or respiratory distress. He is not intubated.      Breath sounds: Normal breath sounds. No stridor. No decreased breath sounds, wheezing or rales.   Chest:      Chest wall: No tenderness.   Abdominal:      General: There is no distension or abdominal bruit.      Palpations: There is no mass or pulsatile mass.      Tenderness: There is no abdominal tenderness. There is no guarding or rebound.   Musculoskeletal:         General: No tenderness. Normal range of motion.      Cervical back: Normal range of motion and neck supple.   Lymphadenopathy:      Cervical: No cervical adenopathy.   Skin:     General: Skin is warm.      Coloration: Skin is not pale.      Findings: No erythema or rash.   Neurological:      Mental Status: He is alert and oriented to person, place, and  time.      Cranial Nerves: No cranial nerve deficit.      Coordination: Coordination normal.      Deep Tendon Reflexes: Reflexes are normal and symmetric.   Psychiatric:         Behavior: Behavior normal.         Thought Content: Thought content normal.         Judgment: Judgment normal.     Physical findings from previous visit       Assessment:     1. Atherosclerosis of native artery of both lower extremities with intermittent claudication    2. Mixed hyperlipidemia    3. Primary hypertension    4. Venous insufficiency of both lower extremities    5. Coronary artery disease involving native coronary artery of native heart without angina pectoris    6. CKD (chronic kidney disease) stage 4, GFR 15-29 ml/min    7. Anemia due to stage 4 chronic kidney disease        Plan:       He will proceed with revascularization for lifestyle limiting claudication despite walking program. He is unable to perform his job as a . Procedure will be done with CO2 angiography + EVUS + IVUS in view of CKD to prevent ANDREA.       R radial access for diagnostic then determine intervention plan. Left leg is more symptomatic than the right      Risks, benefits and alternatives of peripheral catheterization and possible intervention were discussed with the patient. All questions were answered and informed consent obtained.     I discussed the importance of compliance with dual antiplatelet therapy with the patient to prevent acute or late stent thrombosis with premature discontinuation of the therapy.         Medical therapy for CAD  DAPT with aspirin + plavix  Intense statin therapy  Arb  Pletal       Target LDL < 70  Low salt diet  Weight loss        Continue with current medical plan and lifestyle changes.  Return sooner for concerns or questions. If symptoms persist go to the ED  I have reviewed all pertinent data on this patient   Referral for cataract disease        Follow up as scheduled. Return sooner for concerns  or questions  He expressed verbal understanding and agreed with the plan        Greater than 50% of the visit of 45 minutes was spent counseling, educating, and coordinating the care of the patient.        Greater than 50% of the visit of 45 minutes was spent counseling, educating, and coordinating the care of the patient.      -In today's visit, at least 4 established conditions that pose a risk to life or bodily function have been addressed and the conditions are severe.    -In today's visit, monitoring for drug toxicity was accomplished.        Follow up as scheduled. Return sooner for concerns or questions        Patient's Medications   New Prescriptions    No medications on file   Previous Medications    ASPIRIN (ASPIR-81 ORAL)    Take 81 mg by mouth once daily.    CARVEDILOL (COREG) 25 MG TABLET    Take 2 tablets (50 mg total) by mouth 2 (two) times daily with meals.    CLOPIDOGREL (PLAVIX) 75 MG TABLET    Take 1 tablet (75 mg total) by mouth once daily.    COENZYME Q10 100 MG CAPSULE    Take 100 mg by mouth once daily.    DOXAZOSIN (CARDURA) 4 MG TABLET    TAKE 1 TABLET(4 MG) BY MOUTH EVERY EVENING    EPLERENONE (INSPRA) 50 MG TAB    Take 1 tablet (50 mg total) by mouth once daily.    GABAPENTIN (NEURONTIN) 300 MG CAPSULE    Take 1 capsule (300 mg total) by mouth 3 (three) times daily as needed (back pain).    HYDRALAZINE (APRESOLINE) 100 MG TABLET    Take 1 tablet (100 mg total) by mouth 2 (two) times a day.    NIFEDIPINE (PROCARDIA-XL) 60 MG (OSM) 24 HR TABLET    Take 1 tablet (60 mg total) by mouth once daily.    ROSUVASTATIN (CRESTOR) 40 MG TAB    Take 1 tablet (40 mg total) by mouth every evening.   Modified Medications    No medications on file   Discontinued Medications    No medications on file

## 2022-02-17 NOTE — PROGRESS NOTES
"  PATIENT: Domingo Gross  MRN: [id number]  DATE: 2/17/2022    Chief Complaint: anemia 2/2 CKD    Subjective:     History of Present Illness:     Patient has CAD, PAD amongst other comorbidities, takes aspirin and Plavix.    He was admitted to the hospital in July 2021 with symptomatic anemia, hemoglobin 6.2, creatinine 3.3, transfused 2 units packed red blood cells.  He was evaluated by Gastroenterology, has not undergone EGD or colonoscopy while inpatient.    Recent and prior CBCs reviewed    Hemoglobin between 9-10 grams/deciliter in 2019    Hemoglobin between 8-9 grams/deciliter in 2020    Hemoglobin 6 on 07/15/2021    10/12/2021 Bone marrow biopsy did not show any evidence of leukemia or myelodysplasia    Underwent 1 unit PRBC on 10/12/2021, 1 unit 12/3/2021    INTERVAL HISTORY:   -has tiredness and fatigue  -received 4 doses of Venofer November/December 2021  -started Retacrit 52261 units every 2 weeks, starting 12/23/2021  -received 3 doses of retacrit so far    Past Medical, Surgical, Family and Social History Reviewed.    Medications and Allergies reviewed      Review of Systems   Constitutional: Positive for fatigue. Negative for fever and unexpected weight change.       Objective:     Vitals:    02/17/22 1438   BP: 115/66   BP Location: Left arm   Patient Position: Sitting   BP Method: Medium (Automatic)   Pulse: 74   Resp: 20   Temp: 97.7 °F (36.5 °C)   TempSrc: Oral   SpO2: 98%   Weight: 79.9 kg (176 lb 2.4 oz)   Height: 5' 10" (1.778 m)       BMI: Body mass index is 25.27 kg/m².    Physical Exam  Vitals and nursing note reviewed.   Constitutional:       General: He is not in acute distress.  Pulmonary:      Effort: Pulmonary effort is normal.      Breath sounds: Normal breath sounds.   Neurological:      Mental Status: He is alert. Mental status is at baseline.       Assessment:       1. Anemia in stage 3b chronic kidney disease    2. Anemia due to stage 4 chronic kidney disease    3. CKD (chronic " kidney disease) stage 4, GFR 15-29 ml/min    4. PAD (peripheral artery disease)        Plan:   Anemia secondary to chronic kidney disease  -10/12/2021 BMBx did not reveal any evidence of leukemia or myelodysplasia  -worsening anemia secondary to stage 4 chronic kidney disease  -started Retacrit December 2021, 85580 units every 2 weeks  -reviewed CBC, CMP, ferritin, iron saturation  -will continue Retacrit, decrease the dose to 94625 units every 3 weeks  -check CBC, CMP, ferritin, iron saturation and follow-up in 6 weeks    Coronary artery disease/peripheral arterial disease  -follows with cardiology, Dr. Jenkins  -on aspirin and Plavix    Chronic kidney disease, Stage 4   -creatinine stable

## 2022-02-17 NOTE — PATIENT INSTRUCTIONS
Patient Education       Peripheral Vascular (Arterial) Disease Discharge Instructions   About this topic   Peripheral vascular disease is also called PVD. It is where the arteries that carry blood, oxygen, and nutrients to your legs, arms, or pelvis become narrow or blocked. PVD is caused by the buildup of fatty material in your arteries. This fatty material is called plaque. Plaque is a sticky substance found in the blood. The arteries begin to narrow and limit blood flow to your body.  PVD can cause numbness and pain in the leg. If not treated, it can make walking painful. PVD may lead to more serious health problems.  Treatments may lower your signs and slow how fast the disease progresses. It may also prevent other problems.  This illness is managed with drugs and lifestyle changes. There is no cure for PVD. Surgery may be needed if you have a more serious case.  What care is needed at home?   · Ask your doctor what you need to do when you go home. Make sure you ask questions if you do not understand what the doctor says. This way you will know what you need to do.  · When lying down or relaxing, raise your legs above the level of your heart.  · Wear loose fitting pants and other clothes. This will help keep good blood flow to your legs.  · When sitting, do not cross your legs.  · If you smoke, ask your doctor how to quit. Quitting is very important in treatment.  · Work with your doctor to treat other illnesses like high cholesterol or diabetes.  What follow-up care is needed?   · Your doctor may ask you to make visits to the office to check on your progress. Be sure to keep these visits.  · There may be other tests needed to check your progress.  What drugs may be needed?   The doctor may order drugs to:  · Prevent blood clots  · Help you stop smoking  · Lower high blood pressure  · Lower cholesterol levels  · Improve blood sugar control if you have diabetes  Will physical activity be limited?   Walking is  good for this illness. Talk to your doctor about an exercise program for you. You may need to see a physical therapist to find the best exercises for you.  What changes to diet are needed?   · Follow a heart healthy diet. Ask the dietitian for a plan.  · Eat foods high in fiber like fruits, vegetables, cereals, whole grains, and beans.  · Do not eat foods high in cholesterol and saturated fats like fried foods, fatty meats, sausages, and canned meats.  · Limit salty foods. Avoid canned, dried, and processed foods. Do not add salt to your food. Season food with herbs when you cook.  · Limit caffeine intake.  · Avoid beer, wine, and mixed drinks (alcohol). Ask for help to stop you from drinking.  What problems could happen?   · Problems with your heart  · Stroke  · Less able to get around  When do I need to call the doctor?   Activate the emergency medical system right away if you have signs of stroke or heart attack. Call 911 in the [place] or [place]. The sooner treatment begins, the better your chances for recovery. Call for emergency help right away if you have:  · Signs of stroke:  ? Sudden numbness or weakness of the face, arm, or leg, especially on one side of the body  ? Sudden confusion, trouble speaking or understanding  ? Sudden trouble seeing in one or both eyes  ? Sudden trouble walking, dizziness, loss of balance or coordination  ? Sudden severe headache with no known cause  · Signs of heart attack:  ? Chest pain  ? Trouble breathing  ? Fast heartbeat  ? Feeling dizzy  Call your doctor if you have:  · Very bad pain in the leg or foot  · Color of foot changes to blue or gray and becomes cold  · Feelings of being very tired or weak  Teach Back: Helping You Understand   The Teach Back Method helps you understand the information we are giving you. After you talk with the staff, tell them in your own words what you learned. This helps to make sure the staff has described each thing clearly. It also helps  "to explain things that may have been confusing. Before going home, make sure you can do these:  · I can tell you about my condition.  · I can tell you what I can do to help keep good blood flow to my legs.  · I can tell you what I will do if I have signs of a heart attack or stroke.  Where can I learn more?   Centers for Disease Control and Prevention  https://www.cdc.gov/heartdisease/PAD.htm   NHS Choices  https://www.nhs.uk/conditions/peripheral-arterial-disease-pad/treatment/   Last Reviewed Date   2020-02-26  Consumer Information Use and Disclaimer   This information is not specific medical advice and does not replace information you receive from your health care provider. This is only a brief summary of general information. It does NOT include all information about conditions, illnesses, injuries, tests, procedures, treatments, therapies, discharge instructions or life-style choices that may apply to you. You must talk with your health care provider for complete information about your health and treatment options. This information should not be used to decide whether or not to accept your health care providers advice, instructions or recommendations. Only your health care provider has the knowledge and training to provide advice that is right for you.  Copyright   Copyright © 2021 UpToDate, Inc. and its affiliates and/or licensors. All rights reserved.  Patient Education       Peripheral Artery Disease and Claudication   The Basics   Written by the doctors and editors at [facility]   What is peripheral artery disease? -- Peripheral artery disease (PAD) is a condition that affects the blood vessels (called arteries) that bring blood to the legs. PAD can cause muscle pain that gets worse with activity and better with rest, called "claudication." PAD can also cause wounds to heal more slowly than usual. This article is only about the leg pain related to PAD.  Normally, blood flows easily through arteries to all " "parts of the body. But sometimes, fatty clumps called "plaques" build up inside the walls of arteries (figure 1). Plaques can cause arteries to become narrow or blocked. This prevents blood from flowing normally. When muscles do not get enough blood, symptoms can occur.  Some people have a greater chance of getting PAD, such as those who:  · Smoke  · Have diabetes  · Have high cholesterol  · Have high blood pressure  What are the symptoms of PAD? -- PAD often causes pain in the back of the lower leg. The pain usually gets worse with walking or other exercise, and gets better with rest. PAD can also cause pain in the buttocks, thighs, or sometimes in the feet. People who have leg pain can have other symptoms, too, such as:  · Trouble walking up stairs  · Trouble with sexual arousal   Symptoms of claudication can be mild or severe, depending on:  · Which arteries are affected  · How narrow the arteries are  · How much activity a person does  Is there a test for PAD? -- Yes. Your doctor or nurse can do different tests to find out if you have PAD, and to check how severe it is. Your doctor might:  · Take the blood pressure in your arm and lower leg (just above the ankle) at rest and right after exercise, and compare them  · Take the blood pressure in other places in your leg (like the thigh)   · Order a blood vessel imaging test such as an ultrasound, which can show pictures of your leg arteries  How can I help treat my PAD? -- To help treat your PAD and prevent it from getting worse, you can:  · Stop smoking  · Get your diabetes, high blood pressure, and high cholesterol under control (if you have these conditions)  · Walking - Doctors recommend that most people with PAD walk every day. Ask your doctor or nurse how best to begin a walking program.  What other treatments might I have? -- Along with a walking program and getting medical conditions under control, some people are also treated with medicines. The medicines " used to treat PAD can reduce symptoms, increase blood flow to the legs, and help people walk farther without pain. Most doctors ask their patients to try a medicine called cilostazol (brand name: Pletal). But people who have certain heart problems cannot take cilostazol and must take a different medicine.  If you still have severe symptoms after trying medicines, your doctor will talk with you about the possibility of having a procedure to increase blood flow to your legs and feet. Your treatment options might include:  · Angioplasty or stenting - During angioplasty or stenting, the doctor sends a thin tube with a balloon at the end of it to the part of the artery that is blocked. Then the doctor inflates the balloon to open the blockage. Often the doctor props open the artery using a tiny mesh tube called a stent, which stays in the body.  · Bypass surgery - During bypass surgery, the doctor removes a piece of blood vessel (vein) from another part of the body. Then they reattach that piece of blood vessel (called a vein graft) above and below the area that is clogged. This re-routes blood around the clog, and allows it to get to the part of the leg that was not getting enough blood. Sometimes instead of taking a graft from another part of the body, the doctor can use a man-made graft.  What should I know about the different procedures? -- Angioplasty and stenting work best for treating blocked areas that are short. Surgery works better long-term for treating blocked areas that are long. People who have the fewest long-term problems after bypass surgery include those who are younger than 70 and do not have diabetes.  Your doctor might recommend a procedure for you, depending on your symptoms, age, and medical problems. But many people can choose which procedure to have. If your doctor offers you a choice, ask:  · What are the benefits of each procedure for me?  · What are the downsides of each procedure for  me?  · What happens if I do not have any procedure?  All topics are updated as new evidence becomes available and our peer review process is complete.  This topic retrieved from [employer] on: Sep 21, 2021.  Topic 99749 Version 10.0  Release: 29.4.2 - C29.263  © 2021 UpToDate, Inc. and/or its affiliates. All rights reserved.  figure 1: Peripheral artery disease     Graphic 29431 Version 6.0    Consumer Information Use and Disclaimer   This information is not specific medical advice and does not replace information you receive from your health care provider. This is only a brief summary of general information. It does NOT include all information about conditions, illnesses, injuries, tests, procedures, treatments, therapies, discharge instructions or life-style choices that may apply to you. You must talk with your health care provider for complete information about your health and treatment options. This information should not be used to decide whether or not to accept your health care provider's advice, instructions or recommendations. Only your health care provider has the knowledge and training to provide advice that is right for you. The use of this information is governed by the [employer] End User License Agreement, available at https://www.[employer]/en/solutions/[employer]/about/cl.The use of [employer] content is governed by the [employer] Terms of Use. ©2021 UpToDate, Inc. All rights reserved.  Copyright   © 2021 UpToDate, Inc. and/or its affiliates. All rights reserved.

## 2022-02-18 ENCOUNTER — INFUSION (OUTPATIENT)
Dept: INFUSION THERAPY | Facility: HOSPITAL | Age: 62
End: 2022-02-18
Payer: MEDICAID

## 2022-02-18 VITALS — BODY MASS INDEX: 25.21 KG/M2 | HEIGHT: 70 IN | WEIGHT: 176.13 LBS

## 2022-02-18 DIAGNOSIS — D63.1 ANEMIA DUE TO STAGE 4 CHRONIC KIDNEY DISEASE: Primary | ICD-10-CM

## 2022-02-18 DIAGNOSIS — N18.4 ANEMIA DUE TO STAGE 4 CHRONIC KIDNEY DISEASE: Primary | ICD-10-CM

## 2022-02-18 PROCEDURE — 63600175 PHARM REV CODE 636 W HCPCS: Mod: JG | Performed by: INTERNAL MEDICINE

## 2022-02-18 PROCEDURE — 96372 THER/PROPH/DIAG INJ SC/IM: CPT

## 2022-02-18 RX ADMIN — EPOETIN ALFA-EPBX 40000 UNITS: 40000 INJECTION, SOLUTION INTRAVENOUS; SUBCUTANEOUS at 08:02

## 2022-02-18 NOTE — NURSING
Patient tolerated Retacrit injection well, no complaints at this time. Next appointment scheduled and pt d/c in no acute distress.

## 2022-03-04 ENCOUNTER — PATIENT MESSAGE (OUTPATIENT)
Dept: CARDIOLOGY | Facility: CLINIC | Age: 62
End: 2022-03-04
Payer: MEDICAID

## 2022-03-11 ENCOUNTER — INFUSION (OUTPATIENT)
Dept: INFUSION THERAPY | Facility: HOSPITAL | Age: 62
End: 2022-03-11
Attending: INTERNAL MEDICINE
Payer: MEDICAID

## 2022-03-11 VITALS — BODY MASS INDEX: 25.21 KG/M2 | HEIGHT: 70 IN | WEIGHT: 176.13 LBS

## 2022-03-11 DIAGNOSIS — D63.1 ANEMIA DUE TO STAGE 4 CHRONIC KIDNEY DISEASE: Primary | ICD-10-CM

## 2022-03-11 DIAGNOSIS — N18.4 ANEMIA DUE TO STAGE 4 CHRONIC KIDNEY DISEASE: Primary | ICD-10-CM

## 2022-03-11 PROCEDURE — 96372 THER/PROPH/DIAG INJ SC/IM: CPT

## 2022-03-11 PROCEDURE — 63600175 PHARM REV CODE 636 W HCPCS: Mod: JG | Performed by: INTERNAL MEDICINE

## 2022-03-11 RX ADMIN — EPOETIN ALFA-EPBX 40000 UNITS: 40000 INJECTION, SOLUTION INTRAVENOUS; SUBCUTANEOUS at 02:03

## 2022-03-19 ENCOUNTER — PATIENT MESSAGE (OUTPATIENT)
Dept: CARDIOLOGY | Facility: HOSPITAL | Age: 62
End: 2022-03-19
Payer: MEDICAID

## 2022-03-28 ENCOUNTER — PATIENT MESSAGE (OUTPATIENT)
Dept: INTERNAL MEDICINE | Facility: CLINIC | Age: 62
End: 2022-03-28
Payer: MEDICAID

## 2022-03-30 ENCOUNTER — LAB VISIT (OUTPATIENT)
Dept: LAB | Facility: HOSPITAL | Age: 62
End: 2022-03-30
Attending: INTERNAL MEDICINE
Payer: MEDICAID

## 2022-03-30 DIAGNOSIS — D63.1 ANEMIA IN STAGE 3B CHRONIC KIDNEY DISEASE: ICD-10-CM

## 2022-03-30 DIAGNOSIS — N18.32 ANEMIA IN STAGE 3B CHRONIC KIDNEY DISEASE: ICD-10-CM

## 2022-03-30 LAB
ALBUMIN SERPL BCP-MCNC: 3.1 G/DL (ref 3.5–5.2)
ALP SERPL-CCNC: 52 U/L (ref 55–135)
ALT SERPL W/O P-5'-P-CCNC: 13 U/L (ref 10–44)
ANION GAP SERPL CALC-SCNC: 11 MMOL/L (ref 8–16)
AST SERPL-CCNC: 20 U/L (ref 10–40)
BASOPHILS # BLD AUTO: 0.06 K/UL (ref 0–0.2)
BASOPHILS NFR BLD: 1 % (ref 0–1.9)
BILIRUB SERPL-MCNC: 0.4 MG/DL (ref 0.1–1)
BUN SERPL-MCNC: 26 MG/DL (ref 6–20)
CALCIUM SERPL-MCNC: 8.9 MG/DL (ref 8.7–10.5)
CHLORIDE SERPL-SCNC: 106 MMOL/L (ref 95–110)
CO2 SERPL-SCNC: 24 MMOL/L (ref 23–29)
CREAT SERPL-MCNC: 2.3 MG/DL (ref 0.5–1.4)
DIFFERENTIAL METHOD: ABNORMAL
EOSINOPHIL # BLD AUTO: 0.2 K/UL (ref 0–0.5)
EOSINOPHIL NFR BLD: 2.9 % (ref 0–8)
ERYTHROCYTE [DISTWIDTH] IN BLOOD BY AUTOMATED COUNT: 13.7 % (ref 11.5–14.5)
EST. GFR  (AFRICAN AMERICAN): 34.4 ML/MIN/1.73 M^2
EST. GFR  (NON AFRICAN AMERICAN): 29.8 ML/MIN/1.73 M^2
FERRITIN SERPL-MCNC: 196 NG/ML (ref 20–300)
GLUCOSE SERPL-MCNC: 113 MG/DL (ref 70–110)
HCT VFR BLD AUTO: 31.7 % (ref 40–54)
HGB BLD-MCNC: 10.2 G/DL (ref 14–18)
IMM GRANULOCYTES # BLD AUTO: 0.01 K/UL (ref 0–0.04)
IMM GRANULOCYTES NFR BLD AUTO: 0.2 % (ref 0–0.5)
IRON SERPL-MCNC: 112 UG/DL (ref 45–160)
LYMPHOCYTES # BLD AUTO: 1.2 K/UL (ref 1–4.8)
LYMPHOCYTES NFR BLD: 21 % (ref 18–48)
MCH RBC QN AUTO: 27.6 PG (ref 27–31)
MCHC RBC AUTO-ENTMCNC: 32.2 G/DL (ref 32–36)
MCV RBC AUTO: 86 FL (ref 82–98)
MONOCYTES # BLD AUTO: 0.5 K/UL (ref 0.3–1)
MONOCYTES NFR BLD: 8.7 % (ref 4–15)
NEUTROPHILS # BLD AUTO: 3.9 K/UL (ref 1.8–7.7)
NEUTROPHILS NFR BLD: 66.2 % (ref 38–73)
NRBC BLD-RTO: 0 /100 WBC
PLATELET # BLD AUTO: 200 K/UL (ref 150–450)
PMV BLD AUTO: 10.2 FL (ref 9.2–12.9)
POTASSIUM SERPL-SCNC: 2.9 MMOL/L (ref 3.5–5.1)
PROT SERPL-MCNC: 6.2 G/DL (ref 6–8.4)
RBC # BLD AUTO: 3.69 M/UL (ref 4.6–6.2)
SATURATED IRON: 35 % (ref 20–50)
SODIUM SERPL-SCNC: 141 MMOL/L (ref 136–145)
TOTAL IRON BINDING CAPACITY: 324 UG/DL (ref 250–450)
TRANSFERRIN SERPL-MCNC: 219 MG/DL (ref 200–375)
WBC # BLD AUTO: 5.86 K/UL (ref 3.9–12.7)

## 2022-03-30 PROCEDURE — 36415 COLL VENOUS BLD VENIPUNCTURE: CPT | Mod: PO | Performed by: INTERNAL MEDICINE

## 2022-03-30 PROCEDURE — 84466 ASSAY OF TRANSFERRIN: CPT | Performed by: INTERNAL MEDICINE

## 2022-03-30 PROCEDURE — 80053 COMPREHEN METABOLIC PANEL: CPT | Performed by: INTERNAL MEDICINE

## 2022-03-30 PROCEDURE — 82728 ASSAY OF FERRITIN: CPT | Performed by: INTERNAL MEDICINE

## 2022-03-30 PROCEDURE — 85025 COMPLETE CBC W/AUTO DIFF WBC: CPT | Performed by: INTERNAL MEDICINE

## 2022-03-31 ENCOUNTER — OFFICE VISIT (OUTPATIENT)
Dept: HEMATOLOGY/ONCOLOGY | Facility: CLINIC | Age: 62
End: 2022-03-31
Payer: MEDICAID

## 2022-03-31 VITALS
SYSTOLIC BLOOD PRESSURE: 121 MMHG | HEART RATE: 78 BPM | RESPIRATION RATE: 18 BRPM | WEIGHT: 180.56 LBS | DIASTOLIC BLOOD PRESSURE: 65 MMHG | OXYGEN SATURATION: 96 % | BODY MASS INDEX: 25.91 KG/M2

## 2022-03-31 DIAGNOSIS — N18.4 CKD (CHRONIC KIDNEY DISEASE), STAGE IV: ICD-10-CM

## 2022-03-31 DIAGNOSIS — N18.4 CKD (CHRONIC KIDNEY DISEASE) STAGE 4, GFR 15-29 ML/MIN: ICD-10-CM

## 2022-03-31 DIAGNOSIS — N18.32 ANEMIA IN STAGE 3B CHRONIC KIDNEY DISEASE: Primary | ICD-10-CM

## 2022-03-31 DIAGNOSIS — D63.1 ANEMIA IN STAGE 3B CHRONIC KIDNEY DISEASE: Primary | ICD-10-CM

## 2022-03-31 DIAGNOSIS — N18.4 ANEMIA DUE TO STAGE 4 CHRONIC KIDNEY DISEASE: ICD-10-CM

## 2022-03-31 DIAGNOSIS — I73.9 PAD (PERIPHERAL ARTERY DISEASE): ICD-10-CM

## 2022-03-31 DIAGNOSIS — D63.1 ANEMIA DUE TO STAGE 4 CHRONIC KIDNEY DISEASE: ICD-10-CM

## 2022-03-31 DIAGNOSIS — E87.6 HYPOKALEMIA: ICD-10-CM

## 2022-03-31 PROCEDURE — 1159F MED LIST DOCD IN RCRD: CPT | Mod: CPTII,,, | Performed by: INTERNAL MEDICINE

## 2022-03-31 PROCEDURE — 1160F PR REVIEW ALL MEDS BY PRESCRIBER/CLIN PHARMACIST DOCUMENTED: ICD-10-PCS | Mod: CPTII,,, | Performed by: INTERNAL MEDICINE

## 2022-03-31 PROCEDURE — 3008F BODY MASS INDEX DOCD: CPT | Mod: CPTII,,, | Performed by: INTERNAL MEDICINE

## 2022-03-31 PROCEDURE — 3078F PR MOST RECENT DIASTOLIC BLOOD PRESSURE < 80 MM HG: ICD-10-PCS | Mod: CPTII,,, | Performed by: INTERNAL MEDICINE

## 2022-03-31 PROCEDURE — 99999 PR PBB SHADOW E&M-EST. PATIENT-LVL III: ICD-10-PCS | Mod: PBBFAC,,, | Performed by: INTERNAL MEDICINE

## 2022-03-31 PROCEDURE — 1159F PR MEDICATION LIST DOCUMENTED IN MEDICAL RECORD: ICD-10-PCS | Mod: CPTII,,, | Performed by: INTERNAL MEDICINE

## 2022-03-31 PROCEDURE — 99214 PR OFFICE/OUTPT VISIT, EST, LEVL IV, 30-39 MIN: ICD-10-PCS | Mod: S$PBB,,, | Performed by: INTERNAL MEDICINE

## 2022-03-31 PROCEDURE — 3078F DIAST BP <80 MM HG: CPT | Mod: CPTII,,, | Performed by: INTERNAL MEDICINE

## 2022-03-31 PROCEDURE — 3074F PR MOST RECENT SYSTOLIC BLOOD PRESSURE < 130 MM HG: ICD-10-PCS | Mod: CPTII,,, | Performed by: INTERNAL MEDICINE

## 2022-03-31 PROCEDURE — 1160F RVW MEDS BY RX/DR IN RCRD: CPT | Mod: CPTII,,, | Performed by: INTERNAL MEDICINE

## 2022-03-31 PROCEDURE — 99999 PR PBB SHADOW E&M-EST. PATIENT-LVL III: CPT | Mod: PBBFAC,,, | Performed by: INTERNAL MEDICINE

## 2022-03-31 PROCEDURE — 3074F SYST BP LT 130 MM HG: CPT | Mod: CPTII,,, | Performed by: INTERNAL MEDICINE

## 2022-03-31 PROCEDURE — 3008F PR BODY MASS INDEX (BMI) DOCUMENTED: ICD-10-PCS | Mod: CPTII,,, | Performed by: INTERNAL MEDICINE

## 2022-03-31 PROCEDURE — 99213 OFFICE O/P EST LOW 20 MIN: CPT | Mod: PBBFAC,PO | Performed by: INTERNAL MEDICINE

## 2022-03-31 PROCEDURE — 99214 OFFICE O/P EST MOD 30 MIN: CPT | Mod: S$PBB,,, | Performed by: INTERNAL MEDICINE

## 2022-03-31 RX ORDER — POTASSIUM CHLORIDE 750 MG/1
20 TABLET, EXTENDED RELEASE ORAL DAILY
Qty: 20 TABLET | Refills: 0 | Status: ON HOLD | OUTPATIENT
Start: 2022-03-31 | End: 2022-04-01

## 2022-03-31 NOTE — PROGRESS NOTES
PATIENT: Domingo Gross  MRN: [id number]  DATE: 3/31/2022    Chief Complaint: anemia 2/2 CKD    Subjective:     History of Present Illness:     Patient has CAD, PAD amongst other comorbidities, takes aspirin and Plavix.    He was admitted to the hospital in July 2021 with symptomatic anemia, hemoglobin 6.2, creatinine 3.3, transfused 2 units packed red blood cells.  He was evaluated by Gastroenterology, has not undergone EGD or colonoscopy while inpatient.    Recent and prior CBCs reviewed    Hemoglobin between 9-10 grams/deciliter in 2019    Hemoglobin between 8-9 grams/deciliter in 2020    Hemoglobin 6 on 07/15/2021    10/12/2021 Bone marrow biopsy did not show any evidence of leukemia or myelodysplasia    Underwent 1 unit PRBC on 10/12/2021, 1 unit 12/3/2021    INTERVAL HISTORY:   -has tiredness and fatigue  -received 4 doses of Venofer November/December 2021  -started Retacrit 97747 units every 2 weeks, starting 12/23/2021, got 3 doses  -then retacrit changed to q 3 weeks, got 2 more doses    Past Medical, Surgical, Family and Social History Reviewed.    Medications and Allergies reviewed      Review of Systems   Constitutional: Positive for fatigue. Negative for fever and unexpected weight change.       Objective:     Vitals:    03/31/22 1451   BP: 121/65   BP Location: Left arm   Patient Position: Sitting   Pulse: 78   Resp: 18   SpO2: 96%   Weight: 81.9 kg (180 lb 8.9 oz)       BMI: Body mass index is 25.91 kg/m².    Physical Exam  Vitals and nursing note reviewed.   Constitutional:       General: He is not in acute distress.  Pulmonary:      Effort: Pulmonary effort is normal.      Breath sounds: Normal breath sounds.   Neurological:      Mental Status: He is alert. Mental status is at baseline.       Assessment:       1. Anemia in stage 3b chronic kidney disease    2. CKD (chronic kidney disease), stage IV    3. PAD (peripheral artery disease)    4. Hypokalemia    5. CKD (chronic kidney disease) stage 4,  GFR 15-29 ml/min    6. Anemia due to stage 4 chronic kidney disease        Plan:   Anemia secondary to chronic kidney disease  -10/12/2021 BMBx did not reveal any evidence of leukemia or myelodysplasia  -worsening anemia secondary to stage 4 chronic kidney disease  -started Retacrit December 2021, 83735 units every 2 weeks then q 3 wks  -reviewed CBC, CMP, ferritin, iron saturation  -hemoglobin 10.2, last Retacrit 03/11/2022, hold further doses of retacrit  -check CBC, CMP, ferritin, iron saturation and follow-up in 4 weeks  -if hemoglobin worsens, will plan to restart retacrit - will restart at 59985 units every 4 weeks    Hypokalemia  -prescribed potassium supplementation    Coronary artery disease/peripheral arterial disease  -follows with cardiology, Dr. Jenkins  -on aspirin and Plavix    Chronic kidney disease, Stage 4   -creatinine stable

## 2022-04-01 ENCOUNTER — PATIENT MESSAGE (OUTPATIENT)
Dept: INTERNAL MEDICINE | Facility: CLINIC | Age: 62
End: 2022-04-01
Payer: MEDICAID

## 2022-04-01 ENCOUNTER — HOSPITAL ENCOUNTER (OUTPATIENT)
Facility: HOSPITAL | Age: 62
Discharge: HOME | End: 2022-04-01
Attending: INTERNAL MEDICINE | Admitting: INTERNAL MEDICINE
Payer: MEDICAID

## 2022-04-01 ENCOUNTER — TELEPHONE (OUTPATIENT)
Dept: INTERNAL MEDICINE | Facility: CLINIC | Age: 62
End: 2022-04-01
Payer: MEDICAID

## 2022-04-01 VITALS
OXYGEN SATURATION: 97 % | TEMPERATURE: 97 F | BODY MASS INDEX: 25.77 KG/M2 | HEIGHT: 70 IN | RESPIRATION RATE: 15 BRPM | DIASTOLIC BLOOD PRESSURE: 64 MMHG | SYSTOLIC BLOOD PRESSURE: 111 MMHG | WEIGHT: 180 LBS | HEART RATE: 62 BPM

## 2022-04-01 DIAGNOSIS — Z01.818 PRE-OP TESTING: ICD-10-CM

## 2022-04-01 DIAGNOSIS — E87.6 HYPOKALEMIA: Primary | ICD-10-CM

## 2022-04-01 DIAGNOSIS — I70.211 ATHEROSCLEROSIS OF NATIVE ARTERY OF RIGHT LOWER EXTREMITY WITH INTERMITTENT CLAUDICATION: ICD-10-CM

## 2022-04-01 DIAGNOSIS — I70.213 ATHEROSCLEROSIS OF NATIVE ARTERY OF BOTH LOWER EXTREMITIES WITH INTERMITTENT CLAUDICATION: ICD-10-CM

## 2022-04-01 DIAGNOSIS — Z01.818 PREOP TESTING: Primary | ICD-10-CM

## 2022-04-01 DIAGNOSIS — I25.10 CAD (CORONARY ARTERY DISEASE): ICD-10-CM

## 2022-04-01 PROCEDURE — 99152 MOD SED SAME PHYS/QHP 5/>YRS: CPT | Performed by: INTERNAL MEDICINE

## 2022-04-01 PROCEDURE — 27201423 OPTIME MED/SURG SUP & DEVICES STERILE SUPPLY: Performed by: INTERNAL MEDICINE

## 2022-04-01 PROCEDURE — 93010 EKG 12-LEAD: ICD-10-PCS | Mod: ,,, | Performed by: INTERNAL MEDICINE

## 2022-04-01 PROCEDURE — 75716 ARTERY X-RAYS ARMS/LEGS: CPT | Performed by: INTERNAL MEDICINE

## 2022-04-01 PROCEDURE — C1894 INTRO/SHEATH, NON-LASER: HCPCS | Performed by: INTERNAL MEDICINE

## 2022-04-01 PROCEDURE — 63600175 PHARM REV CODE 636 W HCPCS: Performed by: INTERNAL MEDICINE

## 2022-04-01 PROCEDURE — 99152 PR MOD CONSCIOUS SEDATION, SAME PHYS, 5+ YRS, FIRST 15 MIN: ICD-10-PCS | Mod: ,,, | Performed by: INTERNAL MEDICINE

## 2022-04-01 PROCEDURE — 36245 INS CATH ABD/L-EXT ART 1ST: CPT | Mod: 50 | Performed by: INTERNAL MEDICINE

## 2022-04-01 PROCEDURE — 75716 PR  ANGIO EXTERMITY BILAT: ICD-10-PCS | Mod: 26,,, | Performed by: INTERNAL MEDICINE

## 2022-04-01 PROCEDURE — C1887 CATHETER, GUIDING: HCPCS | Performed by: INTERNAL MEDICINE

## 2022-04-01 PROCEDURE — 36245 INS CATH ABD/L-EXT ART 1ST: CPT | Mod: 50,,, | Performed by: INTERNAL MEDICINE

## 2022-04-01 PROCEDURE — 75716 ARTERY X-RAYS ARMS/LEGS: CPT | Mod: 26,,, | Performed by: INTERNAL MEDICINE

## 2022-04-01 PROCEDURE — 93005 ELECTROCARDIOGRAM TRACING: CPT

## 2022-04-01 PROCEDURE — 93010 ELECTROCARDIOGRAM REPORT: CPT | Mod: ,,, | Performed by: INTERNAL MEDICINE

## 2022-04-01 PROCEDURE — 36245 PR INS CATH ABD/L-EXT ART 1ST ORDER: ICD-10-PCS | Mod: 50,,, | Performed by: INTERNAL MEDICINE

## 2022-04-01 PROCEDURE — 99153 MOD SED SAME PHYS/QHP EA: CPT | Performed by: INTERNAL MEDICINE

## 2022-04-01 PROCEDURE — 99152 MOD SED SAME PHYS/QHP 5/>YRS: CPT | Mod: ,,, | Performed by: INTERNAL MEDICINE

## 2022-04-01 PROCEDURE — 25000003 PHARM REV CODE 250: Performed by: INTERNAL MEDICINE

## 2022-04-01 PROCEDURE — C1769 GUIDE WIRE: HCPCS | Performed by: INTERNAL MEDICINE

## 2022-04-01 RX ORDER — DIPHENHYDRAMINE HYDROCHLORIDE 50 MG/ML
INJECTION INTRAMUSCULAR; INTRAVENOUS
Status: DISCONTINUED | OUTPATIENT
Start: 2022-04-01 | End: 2022-04-01 | Stop reason: HOSPADM

## 2022-04-01 RX ORDER — VERAPAMIL HYDROCHLORIDE 2.5 MG/ML
INJECTION, SOLUTION INTRAVENOUS
Status: DISCONTINUED | OUTPATIENT
Start: 2022-04-01 | End: 2022-04-01 | Stop reason: HOSPADM

## 2022-04-01 RX ORDER — DIPHENHYDRAMINE HCL 25 MG
50 CAPSULE ORAL ONCE
Status: DISCONTINUED | OUTPATIENT
Start: 2022-04-01 | End: 2022-04-01 | Stop reason: HOSPADM

## 2022-04-01 RX ORDER — FENTANYL CITRATE 50 UG/ML
INJECTION, SOLUTION INTRAMUSCULAR; INTRAVENOUS
Status: DISCONTINUED | OUTPATIENT
Start: 2022-04-01 | End: 2022-04-01 | Stop reason: HOSPADM

## 2022-04-01 RX ORDER — ONDANSETRON 4 MG/1
8 TABLET, ORALLY DISINTEGRATING ORAL EVERY 8 HOURS PRN
Status: DISCONTINUED | OUTPATIENT
Start: 2022-04-01 | End: 2022-04-01 | Stop reason: HOSPADM

## 2022-04-01 RX ORDER — HEPARIN SODIUM 1000 [USP'U]/ML
INJECTION, SOLUTION INTRAVENOUS; SUBCUTANEOUS
Status: DISCONTINUED | OUTPATIENT
Start: 2022-04-01 | End: 2022-04-01 | Stop reason: HOSPADM

## 2022-04-01 RX ORDER — LIDOCAINE HYDROCHLORIDE 20 MG/ML
INJECTION, SOLUTION EPIDURAL; INFILTRATION; INTRACAUDAL; PERINEURAL
Status: DISCONTINUED | OUTPATIENT
Start: 2022-04-01 | End: 2022-04-01 | Stop reason: HOSPADM

## 2022-04-01 RX ORDER — SODIUM CHLORIDE 9 MG/ML
INJECTION, SOLUTION INTRAVENOUS CONTINUOUS
Status: ACTIVE | OUTPATIENT
Start: 2022-04-01 | End: 2022-04-01

## 2022-04-01 RX ORDER — HEPARIN SODIUM 200 [USP'U]/100ML
INJECTION, SOLUTION INTRAVENOUS
Status: DISCONTINUED | OUTPATIENT
Start: 2022-04-01 | End: 2022-04-01 | Stop reason: HOSPADM

## 2022-04-01 RX ORDER — ACETAMINOPHEN 325 MG/1
650 TABLET ORAL EVERY 4 HOURS PRN
Status: DISCONTINUED | OUTPATIENT
Start: 2022-04-01 | End: 2022-04-01 | Stop reason: HOSPADM

## 2022-04-01 RX ORDER — MIDAZOLAM HYDROCHLORIDE 1 MG/ML
INJECTION INTRAMUSCULAR; INTRAVENOUS
Status: DISCONTINUED | OUTPATIENT
Start: 2022-04-01 | End: 2022-04-01 | Stop reason: HOSPADM

## 2022-04-01 NOTE — NURSING
Transport requested and spouse in lobby to wait for the cab; pt in [facility] and has no complaints

## 2022-04-01 NOTE — TELEPHONE ENCOUNTER
----- Message from Analy Encarnacion MD sent at 4/1/2022 10:53 AM CDT -----  Needs BMP lab next week. Thanks

## 2022-04-01 NOTE — PLAN OF CARE
Pt arrived with family from home, ambulates without assistance. Patient lying in bed with no complaints of pain or distress noted. Monitors applied. VSS, AAOx4. NADN. EKG completed at bedside. Yellow non-skid socks placed on patient. Fall risk reviewed with patient.     MD consent in chart. Procedure and recovery process explained to patient and all questions answered. Patient verbalized understanding, denies questions. Will continue to monitor for safety and needs.

## 2022-04-01 NOTE — Clinical Note
An angiography of the  right lower extremity selectively was performed with the catheter and hand injected with 0 mL of contrast CO2 CONTRAST USED

## 2022-04-01 NOTE — Clinical Note
The site was marked. The groin, right radial and feet was prepped. The site was prepped with ChloraPrep. The site was clipped. The patient was draped. The patient was positioned supine.

## 2022-04-01 NOTE — BRIEF OP NOTE
Post Cath Note  Preoperative Diagnosis: Atherosclerosis of native artery of both lower extremities with intermittent claudication [I70.213]   Postop Diagnosis: Atherosclerosis of native artery of both lower extremities with intermittent claudication [I70.213]  Referring Physician: Keo Jenkins     Procedure: PTA, PERIPHERAL VESSEL (Right)       Access: Right radial  : Keo Jenkins MD   All Operators: Surgeon(s):  Keo Jenkins MD       Intervention:   - S/P B/L LE angiogram. Significant for proximal and distal L SFA stenosis. CO2 angiography due to CKD. Zero contrast.     See full report for further details    Treatments/Procedures: Procedure(s) (LRB):  PTA, PERIPHERAL VESSEL (Right)     -Closure device: Radial band    Findings:Severe peripheral disease is present. See catheterization report for full details.    Estimated Blood loss: 20 cc    Specimens removed: No  Post Cath Exam:     Vitals:    04/01/22 1122   BP: (!) 146/71   Pulse:    Resp:    Temp:      No unusual pain, hematoma, thrill or bruit at vascular access site.  Distal pulse present without signs of ischemia.    Recommendations:   - Patient tolerated procedure well. No immediate complications  - Routine post-cath care  - Continue aspirin and Plavix   - Routine post cath protocol  - Maximize medical management  - IVF at 75cc/hr for 2 hours  - Continue medical management, Risk factor reduction, Follow-up with outpatient cardiologist   - Staged intervention to L SFA on follow up    Emery Zapata - Pager# [phone]  4/1/2022  2:57 PM  Cardiovascular Fellow  [facility]

## 2022-04-01 NOTE — NURSING
Pt to cath recovery per stretcher w/ 2 rails up and with nurse herr; bedside report received; pt aao; wife in waiting room and updated and dr jeffrey charles reports will speak to her in waiting room with results and plan; pt connected to monitor, pulse ox and bp cuff; skin wm dry color pk; sinus on monitor; right radial vasc band in place w/o any bleeding nor swelling; ns infusing at 75 ml per hr and placed on iv pump and per dr jeffrey charles and present in unit; belongings at bedside;pt has no complaints; discharge instructions begun and will reinforce at discharge

## 2022-04-01 NOTE — NURSING
Pt cleared for discharge to home; wife here for transport and has discharge instructions; pt aao; pt has no complaints; right radial acces   site drsg cdi and no bleeding nor hematoma noted; pt has belongings and wearing hios grey cloth mask for discharge

## 2022-04-01 NOTE — Clinical Note
An angiography of the  left lower extremity selectively was performed with the catheter and hand injected with 0 mL of contrast CO2 CONTRAST USED

## 2022-04-01 NOTE — H&P
The patient has been examined and the H&P has been reviewed:    I concur with the findings and no changes have occurred since H&P was written.    Anesthesia/Surgery risks, benefits and alternative options discussed and understood by patient/family.    B/L claudication symptoms. R > L.     Emery Zapata    HPI:   Domingo Gross 61 y.o. male is here follow up CAD, HTN, HLP, PAF in NSR, edema, and PAD with winsome IIb claudication. He is s/p bilateral SFA, GRUPO, EIA, and R CFA revascularization. He has seen nephrology and later recommended a renal biopsy but he was reasonably reluctant to stop DAPT because of his history of cardiac arrest. His peripheral vascular intervention in 12/2021 was cancelled due to severe anemia with a H/H that required a transfusion with subsequent ongoing intervention by heme/onc. He has a GI work up for anemia was normal. His H/H has improved with heme/onc care.        He was admitted 10/4/2019 for cardiac arrest. He was fully rescued. Initial rhythm was afib with rvr. He was not taken immediately to the cath lab because of ARF + hypokalemia. He had a diagnostic angiogram which revealed patent LM, LAD, RCA stent, and new ostial LCX lesion 99% with R to L collaterals. He had PCI with REZA after his renal function returned to baseline.         8/19/2021 with CP HTN urgency with . Added nifedipine and BP has been stable. No CP        He was admitted on 2/1/2022. His peripheral revascularization procedure was cancelled because of hyperkalemia and denial by insurance company requiring a peer to peer. Both issues have been resolved. He is ready to proceed with revascularization.         ELISABETH with stress 5/2017:   R 1.01 to 0.44   L 0.92 to 0.45    After 6 minutes ambulation      CTA 5/2017:       Patent distal aorta and bilateral GRUPO/EIA stents   L EIA with de shawna 90% stenosis   L SFA 80% with 3 vessel run off     R EIA/CFA with moderate disease   R SFA 80% stenosis with 3 vessel run  off        Peripheral angiogram with intervention 6/2017         Patent bilateral GRUPO/EIA stents.    De shawna 80% left EIA stenosis treated with 9.0 x 80 mm Lifestar  stent post dilated with 8.0 mm balloon.    Bilateral 70-80% SFA stenosis with 3 vessel run off.        3/2018      L SFA intervention for claudication   75% L SFA with 3 vessel run off   7 mmHg resting and 33 mmHg hyperemic mean gradient         L CFA antegrade access   PTA with 6.0 x 150 mm   PTA with 6.0 x 150 mm Lutonix   3 vessel run off           4/13/2018       S/p R SFA PTA for winsome IIb claudication   R CFA antegrade access         R CFA with 50% stenosis-heavily calcified lesion               Baseline /90         R SFA with 70% stenosis with a significant resting and hyperemic mean gradient     3 vessel run off             PTA of R SFA with 6.0 x 150 + 6.0 x 60 mm Lutonix DCB        5/2/2018   Patent arteries post revascularization        2/2019     R 0.84 to 0.27   L 0.90 to 0.66   After ambulation   Severe R calf claudication          Nuclear stress test 2/2019     + ECG with 2 mm ST depression   No wall motion abnormalities   Test stopped at 6 minutes, 7 METs, 86% predicted heart rate   Stopped because of R leg claudication + ECG changes          S/p PCI RCA and LAD with REZA 3/2019       RCA 3.5 x 22 mm Resolute REZA   LAD 2.5 x 30 and 2.5 x 25 mm Resolute REZA      Peripheral aortogram 5/10/2019     R CFA 95% stenosis   3 vessel run off        Seen by Dr. Giron for R CFA endarterectomy but preferable should be off plavix for at least 5 days. He has a risk for stent thrombosis with premature interruption of DAPT. He later had R CFA atherectomy + PTA with DCB.           10/11/2019 for cardiac arrest        S/p staged LCX PCI                    L CFA access              IVUS guided PCI              3.0 x 15 mm Resolute REZA post dilated with 3.5 NC balloon         PET stress 2/2020     No ischemia      Echo 2/2021      EF 55%   Normal  diastolic fn   Normal RV fn   Mild MR/TR/GA         Arterial US 7/2021     Bilateral SFA disease > 70      Venous US 7/2021     No DVT   No superficial reflux   R POP vein reflux > 500 msec    Echo 8/2021      Normal EF   Normal RV fn   Mild-mod MR   Mod GA   PASP 26 mmHg        ELISABETH 11/2021      Resting ELISABETH right 0.84 and left 0.83   Exercise ELISABETH right 0.39 and left 0.32 after 3.5 minutes ambulation   TBI right 0.58 and left 0.48          US 11/2021      Bilateral mid SFA high grade disease        Patient Active Problem List    Diagnosis Date Noted    Hyperkalemia 02/01/2022    Anemia due to stage 4 chronic kidney disease 12/15/2021    Anemia in stage 3b chronic kidney disease 10/26/2021    Anemia 08/20/2021    CKD (chronic kidney disease) stage 4, GFR 15-29 ml/min 08/20/2021    COPD (chronic obstructive pulmonary disease) 08/20/2021    Vitamin D deficiency 10/03/2020    Nuclear sclerotic cataract of left eye 07/02/2020    Nuclear sclerosis, bilateral 06/08/2020    Nephrotic range proteinuria 04/06/2020    Hypertensive kidney disease with stage 3 chronic kidney disease 04/06/2020    Cardiac arrest 10/04/2019    Atrial fibrillation with RVR 10/04/2019    Acute renal failure 10/04/2019    Bilateral carotid artery stenosis     Coronary artery disease involving native coronary artery of native heart without angina pectoris 03/29/2019         3/29/2019    S/p LHC via R radial           LM, LAD, and LCX are patent with luminal irregularities  RCA mid 95% calcified lesion  Normal EF with LVEDP 8 mmHg           PCI of mid LAD with 2.5 x 30 and 2.5 x 15 mm Resolute REZA  PCI of proximal RCA to treat guide dissection with 3.5 x 22 Resolute REZA        10/11/2019 for cardiac arrest        S/p staged LCX PCI                    L CFA access              IVUS guided PCI              3.0 x 15 mm Resolute REZA post dilated with 3.5 NC balloon           Abnormal cardiovascular stress test 02/27/2019         Nuclear  stress test 2/2019     + ECG with 2 mm ST depression   No wall motion abnormalities   Test stopped at 6 minutes, 7 METs, 86% predicted heart rate   Stopped because of R leg claudication + ECG changes            Venous insufficiency of both lower extremities 06/04/2018    Localized edema 06/04/2018    Atherosclerosis of native artery of right lower extremity with intermittent claudication 04/13/2018    Atherosclerosis of native artery of both lower extremities with intermittent claudication 03/02/2018    Left knee pain 01/19/2016    Pain of left lower extremity 01/19/2016    Acute pain of left knee 12/11/2015    Hyperlipidemia 06/12/2015    DJD (degenerative joint disease), lumbar 05/27/2015    Lumbar facet arthropathy 05/27/2015    DDD (degenerative disc disease) 05/27/2015    Spondylosis without myelopathy 05/27/2015    Limb pain 11/21/2014    History of back injury 11/21/2014     20 years ago a bag fell on his back at work      Myalgia 11/21/2014    Claudication in peripheral vascular disease 06/13/2014    PAD (peripheral artery disease) 05/21/2014 6/13/2014      1. Three vessel runoff below the knee bilaterally.  2. Severe disease of the terminal aorta.  3. Successful PTAS.  4. Bilateral common iliac reconstruction with 8 x 39 mm stent extending in the aorta  5. Right common illiac was treated with an overlapping 9 x 60 mm SES   6. Left common iliac was treated with an overlapping 8 x 80 mm SES down to external iliac  7. All stents were post dilated with 9.0 x 60 mm balloons  8. Moderate bilateral SFA disease  9. Chronically occluded left internal iliac artery        6/12/2015      1. 80% mid left SFA with a 20 mmHg resting mean gradient  2. Atherectomy with 2.2 Phonenix Volcano catheter  3. PTA with 6.0 x 100 mm Lutonix drug coated balloon        6/9/2017      Patent bilateral GRUPO/EIA stents  L EIA PTAS 9.0 x 80 mm Life stent post dilated with 8.0 mm balloon  Bilateral 70-80% SFA stenosis  with 3 vessel run off          3/2018      L SFA intervention for claudication   75% L SFA with 3 vessel run off   7 mmHg resting and 33 mmHg hyperemic mean gradient         L CFA antegrade access   PTA with 6.0 x 150 mm   PTA with 6.0 x 150 mm Lutonix   3 vessel run off           4/13/2018       S/p R SFA PTA for winsome IIb claudication   R CFA antegrade access         R CFA with 50% stenosis-heavily calcified lesion               Baseline /90         R SFA with 70% stenosis with a significant resting and hyperemic mean gradient     3 vessel run off             PTA of R SFA with 6.0 x 150 + 6.0 x 60 mm Lutonix DCB        5/2/2018   Patent arteries post revascularization        2/2019     R 0.84 to 0.27   L 0.90 to 0.66   After ambulation   Severe R calf claudication        Peripheral angiogram 5/10/2019                   95% R CFA stenosis; heavily calcified    Patent SFA, POP + 3 vessel run off      Peripheral intervention 7/12/2019    S/p right CFA revascularization for winsome IIb claudication       Assisted JOSY approach   L radial access for visualization   5-6 slender R PT access for delivery of therapy          Atherectomy with SilverHawk catheter   PTA with 7.0 x 40 mm Lutonix DCB            Atherosclerosis of leg with intermittent claudication 04/21/2014     Winsome IIB      Elevated TSH 05/29/2013    HTN (hypertension) 04/29/2013    Elevated fasting blood sugar 04/29/2013                      LAST HbA1c  Lab Results   Component Value Date    HGBA1C 5.7 (H) 10/28/2019       Lipid panel  Lab Results   Component Value Date    CHOL 105 (L) 08/20/2021    CHOL 162 03/15/2021    CHOL 211 (H) 11/25/2019     Lab Results   Component Value Date    HDL 44 08/20/2021    HDL 43 03/15/2021    HDL 53 11/25/2019     Lab Results   Component Value Date    LDLCALC 51.0 (L) 08/20/2021    LDLCALC 98.0 03/15/2021    LDLCALC 123.8 11/25/2019     Lab Results   Component Value Date    TRIG 50 08/20/2021    TRIG 105  03/15/2021    TRIG 171 (H) 11/25/2019     Lab Results   Component Value Date    CHOLHDL 41.9 08/20/2021    CHOLHDL 26.5 03/15/2021    CHOLHDL 25.1 11/25/2019            Review of Systems   Constitutional: Negative for diaphoresis, night sweats, weight gain and weight loss.   HENT: Negative for congestion.    Eyes: Negative for blurred vision, discharge and double vision.   Cardiovascular: Negative for chest pain, claudication, cyanosis, dyspnea on exertion, irregular heartbeat, leg swelling, near-syncope, orthopnea, palpitations, paroxysmal nocturnal dyspnea and syncope.   Respiratory: Negative for cough, shortness of breath and wheezing.    Endocrine: Negative for cold intolerance, heat intolerance and polyphagia.   Hematologic/Lymphatic: Negative for adenopathy and bleeding problem. Does not bruise/bleed easily.   Skin: Negative for dry skin and nail changes.   Musculoskeletal: Negative for arthritis, back pain, falls, joint pain, myalgias and neck pain.   Gastrointestinal: Negative for bloating, abdominal pain, change in bowel habit and constipation.   Genitourinary: Negative for bladder incontinence, dysuria, flank pain, genital sores and missed menses.   Neurological: Negative for aphonia, brief paralysis, difficulty with concentration, dizziness and weakness.   Psychiatric/Behavioral: Negative for altered mental status and memory loss. The patient does not have insomnia.    Allergic/Immunologic: Negative for environmental allergies.       Objective:   Physical Exam  Constitutional:       Appearance: He is well-developed.      Interventions: He is not intubated.  HENT:      Head: Normocephalic and atraumatic.      Right Ear: External ear normal.      Left Ear: External ear normal.   Eyes:      General: No scleral icterus.        Right eye: No discharge.         Left eye: No discharge.      Conjunctiva/sclera: Conjunctivae normal.      Pupils: Pupils are equal, round, and reactive to light.   Neck:      Thyroid:  No thyromegaly.      Vascular: Normal carotid pulses. No carotid bruit, hepatojugular reflux or JVD.      Trachea: No tracheal deviation.   Cardiovascular:      Rate and Rhythm: Normal rate and regular rhythm.  No extrasystoles are present.     Chest Wall: PMI is not displaced.      Pulses: No midsystolic click.           Carotid pulses are 2+ on the right side and 2+ on the left side.       Radial pulses are 2+ on the right side and 2+ on the left side.        Femoral pulses are 2+ on the right side and 2+ on the left side.       Popliteal pulses are 2+ on the right side and 2+ on the left side.        Dorsalis pedis pulses are 1+ on the right side and 2+ on the left side.        Posterior tibial pulses are 1+ on the right side and 2+ on the left side.      Heart sounds: S1 normal and S2 normal. Heart sounds not distant. No murmur heard.    No friction rub. No gallop. No S3 sounds.      Comments:       Biphasic R DP and weak but biphasic R PT doppler signals       Biphasic L DP and PT doppler signals          Pulmonary:      Effort: Pulmonary effort is normal. No tachypnea, bradypnea, accessory muscle usage or respiratory distress. He is not intubated.      Breath sounds: Normal breath sounds. No stridor. No decreased breath sounds, wheezing or rales.   Chest:      Chest wall: No tenderness.   Abdominal:      General: There is no distension or abdominal bruit.      Palpations: There is no mass or pulsatile mass.      Tenderness: There is no abdominal tenderness. There is no guarding or rebound.   Musculoskeletal:         General: No tenderness. Normal range of motion.      Cervical back: Normal range of motion and neck supple.   Lymphadenopathy:      Cervical: No cervical adenopathy.   Skin:     General: Skin is warm.      Coloration: Skin is not pale.      Findings: No erythema or rash.   Neurological:      Mental Status: He is alert and oriented to person, place, and time.      Cranial Nerves: No cranial nerve  deficit.      Coordination: Coordination normal.      Deep Tendon Reflexes: Reflexes are normal and symmetric.   Psychiatric:         Behavior: Behavior normal.         Thought Content: Thought content normal.         Judgment: Judgment normal.     Physical findings from previous visit       Assessment:     1. Preop testing    2. Atherosclerosis of native artery of both lower extremities with intermittent claudication    3. Pre-op testing    4. CAD (coronary artery disease)        Plan:       He will proceed with revascularization for lifestyle limiting claudication despite walking program. He is unable to perform his job as a . Procedure will be done with CO2 angiography + EVUS + IVUS in view of CKD to prevent ANDREA.       R radial access for diagnostic then determine intervention plan. Left leg is more symptomatic than the right      Risks, benefits and alternatives of peripheral catheterization and possible intervention were discussed with the patient. All questions were answered and informed consent obtained.     I discussed the importance of compliance with dual antiplatelet therapy with the patient to prevent acute or late stent thrombosis with premature discontinuation of the therapy.         Medical therapy for CAD  DAPT with aspirin + plavix  Intense statin therapy  Arb  Pletal       Target LDL < 70  Low salt diet  Weight loss        Continue with current medical plan and lifestyle changes.  Return sooner for concerns or questions. If symptoms persist go to the ED  I have reviewed all pertinent data on this patient   Referral for cataract disease        Follow up as scheduled. Return sooner for concerns or questions  He expressed verbal understanding and agreed with the plan        Greater than 50% of the visit of 45 minutes was spent counseling, educating, and coordinating the care of the patient.        Greater than 50% of the visit of 45 minutes was spent counseling, educating, and  coordinating the care of the patient.      -In today's visit, at least 4 established conditions that pose a risk to life or bodily function have been addressed and the conditions are severe.    -In today's visit, monitoring for drug toxicity was accomplished.        Follow up as scheduled. Return sooner for concerns or questions        Current Discharge Medication List      CONTINUE these medications which have NOT CHANGED    Details   ASPIRIN (ASPIR-81 ORAL) Take 81 mg by mouth once daily.      clopidogreL (PLAVIX) 75 mg tablet Take 1 tablet (75 mg total) by mouth once daily.  Qty: 30 tablet, Refills: 11      doxazosin (CARDURA) 4 MG tablet TAKE 1 TABLET(4 MG) BY MOUTH EVERY EVENING  Qty: 90 tablet, Refills: 3    Associated Diagnoses: Essential hypertension      eplerenone (INSPRA) 50 MG Tab Take 1 tablet (50 mg total) by mouth once daily.  Qty: 90 tablet, Refills: 3    Comments: Replacement for spironolactone. Thanks!  Associated Diagnoses: Essential hypertension      gabapentin (NEURONTIN) 300 MG capsule Take 1 capsule (300 mg total) by mouth 3 (three) times daily as needed (back pain).  Qty: 90 capsule, Refills: 11    Associated Diagnoses: Acute bilateral back pain, unspecified back location      hydrALAZINE (APRESOLINE) 100 MG tablet Take 1 tablet (100 mg total) by mouth 2 (two) times a day.  Qty: 180 tablet, Refills: 3    Comments: .  Associated Diagnoses: Essential hypertension      carvediloL (COREG) 25 MG tablet Take 2 tablets (50 mg total) by mouth 2 (two) times daily with meals.  Qty: 360 tablet, Refills: 1    Comments: Dose increase. Please discontinue duplicate prescriptions. Thanks!  Associated Diagnoses: Essential hypertension      coenzyme Q10 100 mg capsule Take 100 mg by mouth once daily.      NIFEdipine (PROCARDIA-XL) 60 MG (OSM) 24 hr tablet Take 1 tablet (60 mg total) by mouth once daily.  Qty: 90 tablet, Refills: 1    Comments: Please delete all prior prescriptions with the same name and  strength, including those on hold.  Associated Diagnoses: Essential hypertension      rosuvastatin (CRESTOR) 40 MG Tab Take 1 tablet (40 mg total) by mouth every evening.  Qty: 90 tablet, Refills: 3         STOP taking these medications       potassium chloride SA (K-DUR,KLOR-CON) 10 MEQ tablet Comments:   Reason for Stopping:

## 2022-04-01 NOTE — DISCHARGE SUMMARY
[place] - Lab ([facility])  Interventional Cardiology  Discharge Summary      Patient Name: Domingo Gross  MRN: [id number]  Admission Date: 4/1/2022  Hospital Length of Stay: 0 days  Discharge Date and Time:  04/01/2022 3:14 PM  Attending Physician: Keo Jenkins MD  Discharging Provider: Emery Zapata MD  Primary Care Physician: Analy Encarnacion MD    HPI:  No notes on file    Procedure(s) (LRB):  PTA, PERIPHERAL VESSEL (Right)     Indwelling Lines/Drains at time of discharge:  Lines/Drains/Airways     None                 Hospital Course:  Hospital Course:   Patient presented for outpatient peripheral angiogram which went without complication. Peripheral angiogram revealed severe L SFA proximal and distal sequential lesions. CO2 angiogram due to CKD, zero contrast . See full cath report in Epic for details. Hemostasis of patient's R Radial access site was achieved with VascBand. Patient was monitored per post-cath protocol, and his R radial access site was c/d/i with no hematoma. Patient was able to ambulate without difficulty. He was feeling well and anticipating discharge home today.     Outpatient Plan:  - There were no medication changes  - Continue aspirin and Plavix   - Return for staged PTA to L SFA        Goals of Care Treatment Preferences:  Code Status: Full Code          Significant Diagnostic Studies: Labs:   BMP:   Recent Labs   Lab 04/01/22  0807   *      K 3.1*      CO2 23   BUN 26*   CREATININE 2.4*   CALCIUM 8.6*   , CMP   Recent Labs   Lab 04/01/22  0807      K 3.1*      CO2 23   *   BUN 26*   CREATININE 2.4*   CALCIUM 8.6*   ANIONGAP 10   ESTGFRAFRICA 33*   EGFRNONAA 28*   , CBC   Recent Labs   Lab 04/01/22  0807   WBC 6.51   HGB 10.2*   HCT 31.7*      , INR   Lab Results   Component Value Date    INR 1.2 07/15/2021    INR 1.0 03/20/2020    INR 1.0 01/30/2020   , Lipid Panel   Lab Results   Component Value Date    CHOL 105 (L) 08/20/2021    HDL 44  08/20/2021    LDLCALC 51.0 (L) 08/20/2021    TRIG 50 08/20/2021    CHOLHDL 41.9 08/20/2021   , Troponin No results for input(s): TROPONINI in the last 168 hours., A1C: No results for input(s): HGBA1C in the last 4320 hours. and All labs within the past 24 hours have been reviewed  Cardiac Graphics: Echocardiogram: 2D echo with color flow doppler: No results found. However, due to the size of the patient record, not all encounters were searched. Please check Results Review for a complete set of results.    Pending Diagnostic Studies:     None        No new Assessment & Plan notes have been filed under this hospital service since the last note was generated.  Service: Interventional Cardiology      Discharged Condition: good    Follow Up:    Patient Instructions:      COVID-19 Routine Screening   Standing Status: Future Number of Occurrences: 1 Standing Exp. Date: 03/28/23     Order Specific Question Answer Comments   Is the patient symptomatic? No    Is this needed for pre-procedure or pre-op testing? Yes    Diagnosis: Pre-op testing [311971]      CBC W/ AUTO DIFFERENTIAL   Standing Status: Future Number of Occurrences: 1 Standing Exp. Date: 03/28/23     BASIC METABOLIC PANEL   Standing Status: Future Number of Occurrences: 1 Standing Exp. Date: 03/28/23     CBC W/ AUTO DIFFERENTIAL   Standing Status: Future Standing Exp. Date: 04/10/23     BASIC METABOLIC PANEL   Standing Status: Future Standing Exp. Date: 04/10/23     BASIC METABOLIC PANEL   Standing Status: Future Standing Exp. Date: 05/21/23     CBC W/ AUTO DIFFERENTIAL   Standing Status: Future Standing Exp. Date: 05/21/23     Diet Cardiac     Medications:  Reconciled Home Medications:      Medication List      CONTINUE taking these medications    ASPIR-81 ORAL  Take 81 mg by mouth once daily.     carvediloL 25 MG tablet  Commonly known as: COREG  Take 2 tablets (50 mg total) by mouth 2 (two) times daily with meals.     clopidogreL 75 mg tablet  Commonly known  as: PLAVIX  Take 1 tablet (75 mg total) by mouth once daily.     coenzyme Q10 100 mg capsule  Take 100 mg by mouth once daily.     doxazosin 4 MG tablet  Commonly known as: CARDURA  TAKE 1 TABLET(4 MG) BY MOUTH EVERY EVENING     eplerenone 50 MG Tab  Commonly known as: INSPRA  Take 1 tablet (50 mg total) by mouth once daily.     gabapentin 300 MG capsule  Commonly known as: NEURONTIN  Take 1 capsule (300 mg total) by mouth 3 (three) times daily as needed (back pain).     hydrALAZINE 100 MG tablet  Commonly known as: APRESOLINE  Take 1 tablet (100 mg total) by mouth 2 (two) times a day.     NIFEdipine 60 MG (OSM) 24 hr tablet  Commonly known as: PROCARDIA-XL  Take 1 tablet (60 mg total) by mouth once daily.     rosuvastatin 40 MG Tab  Commonly known as: CRESTOR  Take 1 tablet (40 mg total) by mouth every evening.        STOP taking these medications    potassium chloride SA 10 MEQ tablet  Commonly known as: K-DUR,KLOR-CON            Time spent on the discharge of patient: 25 minutes    Emery Zapata MD  Interventional Cardiology  [place] - Lab ([facility])

## 2022-04-01 NOTE — DISCHARGE INSTRUCTIONS
Understanding Transradial Cardiac Catheterization    Cardiac catheterization (cardiac cath) is a common, non-surgical procedure. During the procedure, your doctor will insert a long, thin tube (catheter) into an artery and move it up into your heart. Transradial means the catheter is inserted into an artery in the wrist (the radial artery). This procedure can be used to diagnose and treat certain heart problems.  Why do I need a transradial cardiac cath?  You may need a cardiac cath if signs indicate a problem with your heart. These may include:  Symptoms of chest pain, tightness, or heaviness (known as angina). This is a common symptom of blocked heart arteries, known as coronary artery disease.  Symptoms of weakness, dizziness, trouble breathing, or swollen legs or feet. These may be symptoms of a problem with a heart valve or the heart muscle.  Other test results show heart problems. Tests may include stress tests, heart scans, and echocardiography.  During a cardiac cath, your doctor can see the condition of the coronary arteries and heart valves. He or she can also check how well the heart pumps and the flow of blood through the heart. Your doctor can also measure pressures and take blood samples. And, if needed, he or she can open blocked arteries. This can help reduce symptoms of angina.  Cardiac cath is often done using a catheter inserted into an artery in the groin. During transradial cardiac cath, the catheter is inserted into an artery in the wrist. This can mean less bleeding and a faster recovery. Some people may have blockages in the groin arteries as well as in the heart arteries, making it difficult to reach the heart. The transradial approach can be used to get around this problem.   What happens during a transradial cardiac cath?  The procedure is done in the hospital or a surgery center. First, an IV line is put in your arm or hand to deliver fluids and medicines. You will likely be given  medicine to relax you and make you drowsy. When the procedure begins:  You lie on an X-ray table.  The skin over the insertion site in your wrist is numbed.  The doctor makes a tiny puncture or incision into the artery in the wrist. He or she then inserts a catheter and threads it through the blood vessel into your heart.    The doctor may inject a contrast fluid through the catheter into the arteries. This fluid makes the arteries show up better on X-rays.  Tests may be done to check the condition of your heart and arteries. If needed, the doctor can clear blockages in the arteries or do other repairs.  When the doctor is finished, he or she will remove the catheter and put direct pressure on the site to prevent bleeding.  You will stay for a time to recover, and then go home.  What are the risks of transradial cardiac cath?  These include:  Bleeding, bruising, infection, or blood clots  Damage to the radial artery that may cause injury to the hand  Allergic reaction to the contrast fluid  Abnormal heartbeat (arrhythmia)  Damage to blood vessels or tissues  Kidney damage or failure  The need for emergency heart surgery  Heart attack, stroke, or death  Date Last Reviewed: 5/1/2016 © 2000-2017 [employer]. [address] [place] [id number]. All rights reserved. This information is not intended as a substitute for professional medical care. Always follow your healthcare professional's instructions.

## 2022-04-01 NOTE — PLAN OF CARE
2 cc of air removed from right radial vasc band. No hematoma noted. Will continue to monitor.pt sitting up in bed eating meal and po fluids at bedside; discharge instructions to bedside and reviewed w/ patient and verbalizes understanding

## 2022-04-01 NOTE — HOSPITAL COURSE
Hospital Course:   Patient presented for outpatient peripheral angiogram which went without complication. Peripheral angiogram revealed severe L SFA proximal and distal sequential lesions. CO2 angiogram due to CKD, zero contrast . See full cath report in Epic for details. Hemostasis of patient's R Radial access site was achieved with VascBand. Patient was monitored per post-cath protocol, and his R radial access site was c/d/i with no hematoma. Patient was able to ambulate without difficulty. He was feeling well and anticipating discharge home today.     Outpatient Plan:  - There were no medication changes  - Continue aspirin and Plavix   - Return for staged PTA to L SFA

## 2022-04-01 NOTE — Clinical Note
An angiography of the  abdominal aorta was performed with the catheter and hand injected with 0 mL of contrast C02 CONTRAST USED

## 2022-04-08 ENCOUNTER — PATIENT MESSAGE (OUTPATIENT)
Dept: CARDIOLOGY | Facility: CLINIC | Age: 62
End: 2022-04-08
Payer: MEDICAID

## 2022-04-09 ENCOUNTER — PATIENT MESSAGE (OUTPATIENT)
Dept: CARDIOLOGY | Facility: CLINIC | Age: 62
End: 2022-04-09
Payer: MEDICAID

## 2022-04-09 DIAGNOSIS — I70.212 ATHEROSCLEROSIS OF NATIVE ARTERY OF LEFT LOWER EXTREMITY WITH INTERMITTENT CLAUDICATION: Primary | ICD-10-CM

## 2022-04-09 RX ORDER — SODIUM CHLORIDE 9 MG/ML
INJECTION, SOLUTION INTRAVENOUS CONTINUOUS
Status: CANCELLED | OUTPATIENT
Start: 2022-04-09 | End: 2022-04-09

## 2022-04-09 RX ORDER — DIPHENHYDRAMINE HCL 25 MG
50 CAPSULE ORAL ONCE
Status: CANCELLED | OUTPATIENT
Start: 2022-04-09 | End: 2022-04-09

## 2022-04-14 ENCOUNTER — OFFICE VISIT (OUTPATIENT)
Dept: INTERNAL MEDICINE | Facility: CLINIC | Age: 62
End: 2022-04-14
Payer: MEDICAID

## 2022-04-14 VITALS
RESPIRATION RATE: 16 BRPM | HEART RATE: 68 BPM | OXYGEN SATURATION: 96 % | DIASTOLIC BLOOD PRESSURE: 62 MMHG | HEIGHT: 70 IN | WEIGHT: 179.69 LBS | BODY MASS INDEX: 25.73 KG/M2 | SYSTOLIC BLOOD PRESSURE: 102 MMHG

## 2022-04-14 DIAGNOSIS — Z12.5 PROSTATE CANCER SCREENING: ICD-10-CM

## 2022-04-14 DIAGNOSIS — I10 PRIMARY HYPERTENSION: ICD-10-CM

## 2022-04-14 DIAGNOSIS — N18.4 CHRONIC KIDNEY DISEASE, STAGE 4 (SEVERE): ICD-10-CM

## 2022-04-14 DIAGNOSIS — L98.9 SKIN LESION: Primary | ICD-10-CM

## 2022-04-14 DIAGNOSIS — D64.9 CHRONIC ANEMIA: ICD-10-CM

## 2022-04-14 DIAGNOSIS — E87.6 HYPOKALEMIA: ICD-10-CM

## 2022-04-14 PROCEDURE — 99214 OFFICE O/P EST MOD 30 MIN: CPT | Mod: S$PBB,,, | Performed by: INTERNAL MEDICINE

## 2022-04-14 PROCEDURE — 3078F DIAST BP <80 MM HG: CPT | Mod: CPTII,,, | Performed by: INTERNAL MEDICINE

## 2022-04-14 PROCEDURE — 1159F MED LIST DOCD IN RCRD: CPT | Mod: CPTII,,, | Performed by: INTERNAL MEDICINE

## 2022-04-14 PROCEDURE — 3008F BODY MASS INDEX DOCD: CPT | Mod: CPTII,,, | Performed by: INTERNAL MEDICINE

## 2022-04-14 PROCEDURE — 99214 PR OFFICE/OUTPT VISIT, EST, LEVL IV, 30-39 MIN: ICD-10-PCS | Mod: S$PBB,,, | Performed by: INTERNAL MEDICINE

## 2022-04-14 PROCEDURE — 1159F PR MEDICATION LIST DOCUMENTED IN MEDICAL RECORD: ICD-10-PCS | Mod: CPTII,,, | Performed by: INTERNAL MEDICINE

## 2022-04-14 PROCEDURE — 3078F PR MOST RECENT DIASTOLIC BLOOD PRESSURE < 80 MM HG: ICD-10-PCS | Mod: CPTII,,, | Performed by: INTERNAL MEDICINE

## 2022-04-14 PROCEDURE — 3008F PR BODY MASS INDEX (BMI) DOCUMENTED: ICD-10-PCS | Mod: CPTII,,, | Performed by: INTERNAL MEDICINE

## 2022-04-14 PROCEDURE — 99999 PR PBB SHADOW E&M-EST. PATIENT-LVL IV: ICD-10-PCS | Mod: PBBFAC,,, | Performed by: INTERNAL MEDICINE

## 2022-04-14 PROCEDURE — 99214 OFFICE O/P EST MOD 30 MIN: CPT | Mod: PBBFAC,PO | Performed by: INTERNAL MEDICINE

## 2022-04-14 PROCEDURE — 99999 PR PBB SHADOW E&M-EST. PATIENT-LVL IV: CPT | Mod: PBBFAC,,, | Performed by: INTERNAL MEDICINE

## 2022-04-14 PROCEDURE — 3074F PR MOST RECENT SYSTOLIC BLOOD PRESSURE < 130 MM HG: ICD-10-PCS | Mod: CPTII,,, | Performed by: INTERNAL MEDICINE

## 2022-04-14 PROCEDURE — 3074F SYST BP LT 130 MM HG: CPT | Mod: CPTII,,, | Performed by: INTERNAL MEDICINE

## 2022-04-14 RX ORDER — MUPIROCIN 20 MG/G
OINTMENT TOPICAL 3 TIMES DAILY
Qty: 1 EACH | Refills: 3 | Status: ON HOLD | OUTPATIENT
Start: 2022-04-14 | End: 2022-11-14 | Stop reason: HOSPADM

## 2022-04-14 NOTE — PROGRESS NOTES
Patient ID: Domingo Gross is a 61 y.o. male.    Chief Complaint: Hypertension    MEJIA Chirinos is a 61 y.o. male with chronic anemia, CKD stage 4, CAD, PAD, HTN, HLD, COPD, suspect skin cancer and history of cardiac arrest  who presents for routine follow-up of medical conditions.  Recently had aortogram performed by . They played for revascularization procedure.  Patient again with hypokalemia.  Follows with nephrology for CKD stage 4. Reviewed recent labs from 13 days ago with him today.  Blood pressure well controlled in clinic today.  Denies any current lightheadedness or dizziness.  Still has not seen dermatology for suspected skin cancer.  No new acute complaints or concerns today.    Health Maintenance Topics with due status: Not Due       Topic Last Completion Date    Lipid Panel 08/20/2021    Colorectal Cancer Screening 01/06/2022       Review of Systems   All other systems reviewed and are negative.      Objective:     Vitals:    04/14/22 1047   BP: 102/62   Pulse: 68   Resp: 16        Physical Exam  Vitals reviewed.   Constitutional:       General: He is not in acute distress.     Appearance: Normal appearance. He is well-developed. He is not ill-appearing, toxic-appearing or diaphoretic.   HENT:      Head: Normocephalic and atraumatic.      Right Ear: External ear normal.      Left Ear: External ear normal.      Nose: Nose normal.   Eyes:      Extraocular Movements: Extraocular movements intact.      Conjunctiva/sclera: Conjunctivae normal.   Cardiovascular:      Rate and Rhythm: Normal rate and regular rhythm.      Pulses: Normal pulses.      Heart sounds: Normal heart sounds. No murmur heard.    No friction rub. No gallop.   Pulmonary:      Effort: Pulmonary effort is normal. No respiratory distress.      Breath sounds: Normal breath sounds. No stridor. No wheezing, rhonchi or rales.   Musculoskeletal:      Right lower leg: No edema.      Left lower leg: No edema.   Skin:     General: Skin  is warm and dry.      Comments: Large circular heaped up lesion on right temple, oozing some purulent fluid. Suspect skin cancer, specifically basal cell carcinoma   Neurological:      General: No focal deficit present.      Mental Status: He is alert and oriented to person, place, and time. Mental status is at baseline.   Psychiatric:         Mood and Affect: Mood normal.         Behavior: Behavior normal.         Thought Content: Thought content normal.         Judgment: Judgment normal.         Assessment:       1. Skin lesion Active   2. Prostate cancer screening    3. Hypokalemia Chronic   4. Primary hypertension Well controlled   5. Chronic anemia Chronic   6. Chronic kidney disease, stage 4 (severe) Chronic       Plan:         Skin lesion  Comments:  Suspect skin cancer, specifically BCC. Needs to follow up with Dermatology   Orders:  -     mupirocin (BACTROBAN) 2 % ointment; Apply topically 3 (three) times daily.  Dispense: 1 each; Refill: 3  -     Ambulatory referral/consult to Dermatology; Future; Expected date: 04/21/2022    Prostate cancer screening  -     PSA, Screening; Future; Expected date: 04/14/2022    Hypokalemia  Comments:  Pt received oral K supplementation recently. Has recheck of K planned. Not on daily K supplement   Orders:  -     Basic Metabolic Panel; Future; Expected date: 07/14/2022    Primary hypertension  Comments:  Continue current medications    Chronic anemia  Comments:  Management per Hematology/Oncology.    Chronic kidney disease, stage 4 (severe)  Comments:  Management per Nephrology.        RTC 3 months     Warning signs discussed, patient to call with any further issues or worsening of symptoms.       Parts of the above note were dictated using a voice dictation software. Please excuse any grammatical or typographical errors.

## 2022-04-18 ENCOUNTER — PATIENT MESSAGE (OUTPATIENT)
Dept: CARDIOLOGY | Facility: CLINIC | Age: 62
End: 2022-04-18
Payer: MEDICAID

## 2022-04-22 ENCOUNTER — OCCUPATIONAL HEALTH (OUTPATIENT)
Dept: URGENT CARE | Facility: CLINIC | Age: 62
End: 2022-04-22

## 2022-04-22 DIAGNOSIS — Z02.83 ENCOUNTER FOR DRUG SCREENING: Primary | ICD-10-CM

## 2022-04-22 LAB
CTP QC/QA: YES
POC 5 PANEL DRUG SCREEN: NEGATIVE

## 2022-04-22 PROCEDURE — 80305 POCT RAPID DRUG SCREEN 5 PANEL: ICD-10-PCS | Mod: S$GLB,,,

## 2022-04-22 PROCEDURE — 80305 DRUG TEST PRSMV DIR OPT OBS: CPT | Mod: S$GLB,,,

## 2022-04-25 ENCOUNTER — PATIENT MESSAGE (OUTPATIENT)
Dept: CARDIOLOGY | Facility: CLINIC | Age: 62
End: 2022-04-25
Payer: MEDICAID

## 2022-04-25 ENCOUNTER — LAB VISIT (OUTPATIENT)
Dept: LAB | Facility: HOSPITAL | Age: 62
End: 2022-04-25
Attending: INTERNAL MEDICINE
Payer: MEDICAID

## 2022-04-25 DIAGNOSIS — E87.6 HYPOKALEMIA: ICD-10-CM

## 2022-04-25 DIAGNOSIS — Z12.5 PROSTATE CANCER SCREENING: ICD-10-CM

## 2022-04-25 DIAGNOSIS — D63.1 ANEMIA IN STAGE 3B CHRONIC KIDNEY DISEASE: ICD-10-CM

## 2022-04-25 DIAGNOSIS — N18.32 ANEMIA IN STAGE 3B CHRONIC KIDNEY DISEASE: ICD-10-CM

## 2022-04-25 LAB
ALBUMIN SERPL BCP-MCNC: 3.3 G/DL (ref 3.5–5.2)
ALP SERPL-CCNC: 53 U/L (ref 55–135)
ALT SERPL W/O P-5'-P-CCNC: 10 U/L (ref 10–44)
ANION GAP SERPL CALC-SCNC: 12 MMOL/L (ref 8–16)
ANION GAP SERPL CALC-SCNC: 12 MMOL/L (ref 8–16)
AST SERPL-CCNC: 15 U/L (ref 10–40)
BASOPHILS # BLD AUTO: 0.03 K/UL (ref 0–0.2)
BASOPHILS NFR BLD: 0.5 % (ref 0–1.9)
BILIRUB SERPL-MCNC: 0.4 MG/DL (ref 0.1–1)
BUN SERPL-MCNC: 36 MG/DL (ref 6–20)
BUN SERPL-MCNC: 36 MG/DL (ref 6–20)
CALCIUM SERPL-MCNC: 9.3 MG/DL (ref 8.7–10.5)
CALCIUM SERPL-MCNC: 9.3 MG/DL (ref 8.7–10.5)
CHLORIDE SERPL-SCNC: 108 MMOL/L (ref 95–110)
CHLORIDE SERPL-SCNC: 108 MMOL/L (ref 95–110)
CO2 SERPL-SCNC: 22 MMOL/L (ref 23–29)
CO2 SERPL-SCNC: 22 MMOL/L (ref 23–29)
COMPLEXED PSA SERPL-MCNC: 1.1 NG/ML (ref 0–4)
CREAT SERPL-MCNC: 2.2 MG/DL (ref 0.5–1.4)
CREAT SERPL-MCNC: 2.2 MG/DL (ref 0.5–1.4)
DIFFERENTIAL METHOD: ABNORMAL
EOSINOPHIL # BLD AUTO: 0.3 K/UL (ref 0–0.5)
EOSINOPHIL NFR BLD: 4.2 % (ref 0–8)
ERYTHROCYTE [DISTWIDTH] IN BLOOD BY AUTOMATED COUNT: 13.4 % (ref 11.5–14.5)
ERYTHROCYTE [SEDIMENTATION RATE] IN BLOOD BY WESTERGREN METHOD: 59 MM/HR (ref 0–10)
EST. GFR  (AFRICAN AMERICAN): 36 ML/MIN/1.73 M^2
EST. GFR  (AFRICAN AMERICAN): 36 ML/MIN/1.73 M^2
EST. GFR  (NON AFRICAN AMERICAN): 31 ML/MIN/1.73 M^2
EST. GFR  (NON AFRICAN AMERICAN): 31 ML/MIN/1.73 M^2
FERRITIN SERPL-MCNC: 333 NG/ML (ref 20–300)
GLUCOSE SERPL-MCNC: 135 MG/DL (ref 70–110)
GLUCOSE SERPL-MCNC: 135 MG/DL (ref 70–110)
HCT VFR BLD AUTO: 29.5 % (ref 40–54)
HGB BLD-MCNC: 9.7 G/DL (ref 14–18)
IMM GRANULOCYTES # BLD AUTO: 0.01 K/UL (ref 0–0.04)
IMM GRANULOCYTES NFR BLD AUTO: 0.2 % (ref 0–0.5)
IRON SERPL-MCNC: 112 UG/DL (ref 45–160)
LYMPHOCYTES # BLD AUTO: 1.5 K/UL (ref 1–4.8)
LYMPHOCYTES NFR BLD: 24.6 % (ref 18–48)
MCH RBC QN AUTO: 27.8 PG (ref 27–31)
MCHC RBC AUTO-ENTMCNC: 32.9 G/DL (ref 32–36)
MCV RBC AUTO: 85 FL (ref 82–98)
MONOCYTES # BLD AUTO: 0.5 K/UL (ref 0.3–1)
MONOCYTES NFR BLD: 8.6 % (ref 4–15)
NEUTROPHILS # BLD AUTO: 3.9 K/UL (ref 1.8–7.7)
NEUTROPHILS NFR BLD: 61.9 % (ref 38–73)
NRBC BLD-RTO: 0 /100 WBC
PLATELET # BLD AUTO: 171 K/UL (ref 150–450)
PMV BLD AUTO: 10.3 FL (ref 9.2–12.9)
POTASSIUM SERPL-SCNC: 3.9 MMOL/L (ref 3.5–5.1)
POTASSIUM SERPL-SCNC: 3.9 MMOL/L (ref 3.5–5.1)
PROT SERPL-MCNC: 6.7 G/DL (ref 6–8.4)
RBC # BLD AUTO: 3.49 M/UL (ref 4.6–6.2)
SATURATED IRON: 36 % (ref 20–50)
SODIUM SERPL-SCNC: 142 MMOL/L (ref 136–145)
SODIUM SERPL-SCNC: 142 MMOL/L (ref 136–145)
TOTAL IRON BINDING CAPACITY: 315 UG/DL (ref 250–450)
TRANSFERRIN SERPL-MCNC: 213 MG/DL (ref 200–375)
WBC # BLD AUTO: 6.26 K/UL (ref 3.9–12.7)

## 2022-04-25 PROCEDURE — 84153 ASSAY OF PSA TOTAL: CPT | Performed by: INTERNAL MEDICINE

## 2022-04-25 PROCEDURE — 80053 COMPREHEN METABOLIC PANEL: CPT | Performed by: INTERNAL MEDICINE

## 2022-04-25 PROCEDURE — 85652 RBC SED RATE AUTOMATED: CPT | Performed by: INTERNAL MEDICINE

## 2022-04-25 PROCEDURE — 82728 ASSAY OF FERRITIN: CPT | Performed by: INTERNAL MEDICINE

## 2022-04-25 PROCEDURE — 84466 ASSAY OF TRANSFERRIN: CPT | Performed by: INTERNAL MEDICINE

## 2022-04-25 PROCEDURE — 36415 COLL VENOUS BLD VENIPUNCTURE: CPT | Performed by: INTERNAL MEDICINE

## 2022-04-25 PROCEDURE — 85025 COMPLETE CBC W/AUTO DIFF WBC: CPT | Performed by: INTERNAL MEDICINE

## 2022-04-26 ENCOUNTER — PATIENT MESSAGE (OUTPATIENT)
Dept: CARDIOLOGY | Facility: CLINIC | Age: 62
End: 2022-04-26
Payer: MEDICAID

## 2022-04-26 ENCOUNTER — OFFICE VISIT (OUTPATIENT)
Dept: HEMATOLOGY/ONCOLOGY | Facility: CLINIC | Age: 62
End: 2022-04-26
Payer: MEDICAID

## 2022-04-26 VITALS
SYSTOLIC BLOOD PRESSURE: 125 MMHG | HEIGHT: 70 IN | HEART RATE: 68 BPM | BODY MASS INDEX: 25.31 KG/M2 | OXYGEN SATURATION: 98 % | WEIGHT: 176.81 LBS | DIASTOLIC BLOOD PRESSURE: 70 MMHG | RESPIRATION RATE: 20 BRPM | TEMPERATURE: 99 F

## 2022-04-26 DIAGNOSIS — N18.4 CKD (CHRONIC KIDNEY DISEASE), STAGE IV: ICD-10-CM

## 2022-04-26 DIAGNOSIS — D64.9 ANEMIA, UNSPECIFIED TYPE: ICD-10-CM

## 2022-04-26 DIAGNOSIS — D50.9 IRON DEFICIENCY ANEMIA, UNSPECIFIED IRON DEFICIENCY ANEMIA TYPE: Primary | ICD-10-CM

## 2022-04-26 PROCEDURE — 3078F PR MOST RECENT DIASTOLIC BLOOD PRESSURE < 80 MM HG: ICD-10-PCS | Mod: CPTII,,, | Performed by: INTERNAL MEDICINE

## 2022-04-26 PROCEDURE — 3008F BODY MASS INDEX DOCD: CPT | Mod: CPTII,,, | Performed by: INTERNAL MEDICINE

## 2022-04-26 PROCEDURE — 99213 OFFICE O/P EST LOW 20 MIN: CPT | Mod: PBBFAC,PO | Performed by: INTERNAL MEDICINE

## 2022-04-26 PROCEDURE — 1160F RVW MEDS BY RX/DR IN RCRD: CPT | Mod: CPTII,,, | Performed by: INTERNAL MEDICINE

## 2022-04-26 PROCEDURE — 99214 PR OFFICE/OUTPT VISIT, EST, LEVL IV, 30-39 MIN: ICD-10-PCS | Mod: S$PBB,,, | Performed by: INTERNAL MEDICINE

## 2022-04-26 PROCEDURE — 3008F PR BODY MASS INDEX (BMI) DOCUMENTED: ICD-10-PCS | Mod: CPTII,,, | Performed by: INTERNAL MEDICINE

## 2022-04-26 PROCEDURE — 99214 OFFICE O/P EST MOD 30 MIN: CPT | Mod: S$PBB,,, | Performed by: INTERNAL MEDICINE

## 2022-04-26 PROCEDURE — 3078F DIAST BP <80 MM HG: CPT | Mod: CPTII,,, | Performed by: INTERNAL MEDICINE

## 2022-04-26 PROCEDURE — 1159F MED LIST DOCD IN RCRD: CPT | Mod: CPTII,,, | Performed by: INTERNAL MEDICINE

## 2022-04-26 PROCEDURE — 99999 PR PBB SHADOW E&M-EST. PATIENT-LVL III: CPT | Mod: PBBFAC,,, | Performed by: INTERNAL MEDICINE

## 2022-04-26 PROCEDURE — 1160F PR REVIEW ALL MEDS BY PRESCRIBER/CLIN PHARMACIST DOCUMENTED: ICD-10-PCS | Mod: CPTII,,, | Performed by: INTERNAL MEDICINE

## 2022-04-26 PROCEDURE — 99999 PR PBB SHADOW E&M-EST. PATIENT-LVL III: ICD-10-PCS | Mod: PBBFAC,,, | Performed by: INTERNAL MEDICINE

## 2022-04-26 PROCEDURE — 3074F SYST BP LT 130 MM HG: CPT | Mod: CPTII,,, | Performed by: INTERNAL MEDICINE

## 2022-04-26 PROCEDURE — 1159F PR MEDICATION LIST DOCUMENTED IN MEDICAL RECORD: ICD-10-PCS | Mod: CPTII,,, | Performed by: INTERNAL MEDICINE

## 2022-04-26 PROCEDURE — 3074F PR MOST RECENT SYSTOLIC BLOOD PRESSURE < 130 MM HG: ICD-10-PCS | Mod: CPTII,,, | Performed by: INTERNAL MEDICINE

## 2022-04-26 NOTE — PROGRESS NOTES
"  PATIENT: Domingo Gross  MRN: [id number]  DATE: 4/26/2022    Chief Complaint: anemia 2/2 CKD    Subjective:     History of Present Illness:     Patient has CAD, PAD amongst other comorbidities, takes aspirin and Plavix.    He was admitted to the hospital in July 2021 with symptomatic anemia, hemoglobin 6.2, creatinine 3.3, transfused 2 units packed red blood cells.  He was evaluated by Gastroenterology, has not undergone EGD or colonoscopy while inpatient.    Recent and prior CBCs reviewed    Hemoglobin between 9-10 grams/deciliter in 2019    Hemoglobin between 8-9 grams/deciliter in 2020    Hemoglobin 6 on 07/15/2021    10/12/2021 Bone marrow biopsy did not show any evidence of leukemia or myelodysplasia    Underwent 1 unit PRBC on 10/12/2021, 1 unit 12/3/2021    INTERVAL HISTORY:   -has tiredness and fatigue  -received 4 doses of Venofer November/December 2021  -started Retacrit 22944 units every 2 weeks, starting 12/23/2021, got 3 doses  -then retacrit changed to q 3 weeks, got 2 more doses    Past Medical, Surgical, Family and Social History Reviewed.    Medications and Allergies reviewed      Review of Systems   Constitutional: Positive for fatigue. Negative for fever and unexpected weight change.       Objective:     Vitals:    04/26/22 1510   BP: 125/70   BP Location: Right arm   Patient Position: Sitting   BP Method: Medium (Automatic)   Pulse: 68   Resp: 20   Temp: 98.5 °F (36.9 °C)   TempSrc: Oral   SpO2: 98%   Weight: 80.2 kg (176 lb 12.9 oz)   Height: 5' 10" (1.778 m)       BMI: Body mass index is 25.37 kg/m².    Physical Exam  Vitals and nursing note reviewed.   Constitutional:       General: He is not in acute distress.  Pulmonary:      Effort: Pulmonary effort is normal.      Breath sounds: Normal breath sounds.   Neurological:      Mental Status: He is alert. Mental status is at baseline.       Assessment:       1. Iron deficiency anemia, unspecified iron deficiency anemia type    2. Anemia, " unspecified type    3. CKD (chronic kidney disease), stage IV        Plan:   Anemia secondary to chronic kidney disease  -10/12/2021 BMBx did not reveal any evidence of leukemia or myelodysplasia  -worsening anemia secondary to stage 4 chronic kidney disease  -started Retacrit December 2021, 52874 units every 2 weeks then q 3 wks  -reviewed CBC, CMP, ferritin, iron saturation  -hemoglobin 9.7, last Retacrit 03/11/2022  -restart retacrit at 40,000 units every 4 weeks  -check CBC, CMP, ferritin, iron saturation and follow-up in 12 weeks    Coronary artery disease/peripheral arterial disease  -follows with cardiology, Dr. Jenkins  -on aspirin and Plavix    Chronic kidney disease, Stage 4   -creatinine stable

## 2022-05-05 ENCOUNTER — TELEPHONE (OUTPATIENT)
Dept: CARDIOLOGY | Facility: CLINIC | Age: 62
End: 2022-05-05
Payer: MEDICAID

## 2022-05-05 NOTE — TELEPHONE ENCOUNTER
Procedure was scheduled for today.      Nw                                      ----- Message from Keo Jenkins MD sent at 4/9/2022  1:23 PM CDT -----      Please set up a virtual visit  Remind me Monday before you set it up      ZN

## 2022-05-09 ENCOUNTER — PATIENT MESSAGE (OUTPATIENT)
Dept: CARDIOLOGY | Facility: CLINIC | Age: 62
End: 2022-05-09
Payer: MEDICAID

## 2022-05-19 ENCOUNTER — PATIENT MESSAGE (OUTPATIENT)
Dept: ADMINISTRATIVE | Facility: OTHER | Age: 62
End: 2022-05-19
Payer: MEDICAID

## 2022-05-19 ENCOUNTER — LAB VISIT (OUTPATIENT)
Dept: LAB | Facility: HOSPITAL | Age: 62
End: 2022-05-19
Attending: INTERNAL MEDICINE
Payer: MEDICAID

## 2022-05-19 DIAGNOSIS — Z01.810 PRE-OPERATIVE CARDIOVASCULAR EXAMINATION: ICD-10-CM

## 2022-05-19 LAB
ANION GAP SERPL CALC-SCNC: 11 MMOL/L (ref 8–16)
BASOPHILS # BLD AUTO: 0.05 K/UL (ref 0–0.2)
BASOPHILS NFR BLD: 0.8 % (ref 0–1.9)
BUN SERPL-MCNC: 28 MG/DL (ref 6–20)
CALCIUM SERPL-MCNC: 9.4 MG/DL (ref 8.7–10.5)
CHLORIDE SERPL-SCNC: 108 MMOL/L (ref 95–110)
CO2 SERPL-SCNC: 23 MMOL/L (ref 23–29)
CREAT SERPL-MCNC: 2.2 MG/DL (ref 0.5–1.4)
DIFFERENTIAL METHOD: ABNORMAL
EOSINOPHIL # BLD AUTO: 0.3 K/UL (ref 0–0.5)
EOSINOPHIL NFR BLD: 4.1 % (ref 0–8)
ERYTHROCYTE [DISTWIDTH] IN BLOOD BY AUTOMATED COUNT: 13.9 % (ref 11.5–14.5)
EST. GFR  (AFRICAN AMERICAN): 36 ML/MIN/1.73 M^2
EST. GFR  (NON AFRICAN AMERICAN): 31 ML/MIN/1.73 M^2
GLUCOSE SERPL-MCNC: 124 MG/DL (ref 70–110)
HCT VFR BLD AUTO: 24.9 % (ref 40–54)
HGB BLD-MCNC: 8.4 G/DL (ref 14–18)
IMM GRANULOCYTES # BLD AUTO: 0.02 K/UL (ref 0–0.04)
IMM GRANULOCYTES NFR BLD AUTO: 0.3 % (ref 0–0.5)
LYMPHOCYTES # BLD AUTO: 1.4 K/UL (ref 1–4.8)
LYMPHOCYTES NFR BLD: 21.7 % (ref 18–48)
MCH RBC QN AUTO: 28.6 PG (ref 27–31)
MCHC RBC AUTO-ENTMCNC: 33.7 G/DL (ref 32–36)
MCV RBC AUTO: 85 FL (ref 82–98)
MONOCYTES # BLD AUTO: 0.6 K/UL (ref 0.3–1)
MONOCYTES NFR BLD: 8.8 % (ref 4–15)
NEUTROPHILS # BLD AUTO: 4 K/UL (ref 1.8–7.7)
NEUTROPHILS NFR BLD: 64.3 % (ref 38–73)
NRBC BLD-RTO: 0 /100 WBC
PLATELET # BLD AUTO: 158 K/UL (ref 150–450)
PMV BLD AUTO: 9.9 FL (ref 9.2–12.9)
POTASSIUM SERPL-SCNC: 3.7 MMOL/L (ref 3.5–5.1)
RBC # BLD AUTO: 2.94 M/UL (ref 4.6–6.2)
SODIUM SERPL-SCNC: 142 MMOL/L (ref 136–145)
WBC # BLD AUTO: 6.28 K/UL (ref 3.9–12.7)

## 2022-05-19 PROCEDURE — 36415 COLL VENOUS BLD VENIPUNCTURE: CPT | Performed by: INTERNAL MEDICINE

## 2022-05-19 PROCEDURE — 80048 BASIC METABOLIC PNL TOTAL CA: CPT | Performed by: INTERNAL MEDICINE

## 2022-05-19 PROCEDURE — 85025 COMPLETE CBC W/AUTO DIFF WBC: CPT | Performed by: INTERNAL MEDICINE

## 2022-05-20 ENCOUNTER — HOSPITAL ENCOUNTER (OUTPATIENT)
Facility: HOSPITAL | Age: 62
Discharge: HOME | End: 2022-05-20
Attending: INTERNAL MEDICINE | Admitting: INTERNAL MEDICINE
Payer: MEDICAID

## 2022-05-20 VITALS
HEIGHT: 70 IN | TEMPERATURE: 96 F | OXYGEN SATURATION: 98 % | HEART RATE: 65 BPM | DIASTOLIC BLOOD PRESSURE: 61 MMHG | BODY MASS INDEX: 27.2 KG/M2 | WEIGHT: 190 LBS | RESPIRATION RATE: 18 BRPM | SYSTOLIC BLOOD PRESSURE: 120 MMHG

## 2022-05-20 DIAGNOSIS — I70.213 ATHEROSCLEROSIS OF NATIVE ARTERY OF BOTH LOWER EXTREMITIES WITH INTERMITTENT CLAUDICATION: ICD-10-CM

## 2022-05-20 DIAGNOSIS — Z01.810 PRE-OPERATIVE CARDIOVASCULAR EXAMINATION: Primary | ICD-10-CM

## 2022-05-20 DIAGNOSIS — I73.9 PAD (PERIPHERAL ARTERY DISEASE): ICD-10-CM

## 2022-05-20 DIAGNOSIS — I70.212 ATHEROSCLEROSIS OF NATIVE ARTERY OF LEFT LOWER EXTREMITY WITH INTERMITTENT CLAUDICATION: ICD-10-CM

## 2022-05-20 PROCEDURE — 85347 COAGULATION TIME ACTIVATED: CPT | Performed by: INTERNAL MEDICINE

## 2022-05-20 PROCEDURE — 25000003 PHARM REV CODE 250: Performed by: INTERNAL MEDICINE

## 2022-05-20 PROCEDURE — C2623 CATH, TRANSLUMIN, DRUG-COAT: HCPCS | Performed by: INTERNAL MEDICINE

## 2022-05-20 PROCEDURE — 99152 MOD SED SAME PHYS/QHP 5/>YRS: CPT | Mod: ,,, | Performed by: INTERNAL MEDICINE

## 2022-05-20 PROCEDURE — 25500020 PHARM REV CODE 255: Performed by: INTERNAL MEDICINE

## 2022-05-20 PROCEDURE — 99220 PR INITIAL OBSERVATION CARE,LEVL III: CPT | Mod: ,,, | Performed by: INTERNAL MEDICINE

## 2022-05-20 PROCEDURE — 99220 PR INITIAL OBSERVATION CARE,LEVL III: ICD-10-PCS | Mod: ,,, | Performed by: INTERNAL MEDICINE

## 2022-05-20 PROCEDURE — C1769 GUIDE WIRE: HCPCS | Performed by: INTERNAL MEDICINE

## 2022-05-20 PROCEDURE — C1753 CATH, INTRAVAS ULTRASOUND: HCPCS | Performed by: INTERNAL MEDICINE

## 2022-05-20 PROCEDURE — 37225 HC FEM/POPL REVAS W/ATHER: CPT | Mod: LT | Performed by: INTERNAL MEDICINE

## 2022-05-20 PROCEDURE — 99152 MOD SED SAME PHYS/QHP 5/>YRS: CPT | Performed by: INTERNAL MEDICINE

## 2022-05-20 PROCEDURE — C1887 CATHETER, GUIDING: HCPCS | Performed by: INTERNAL MEDICINE

## 2022-05-20 PROCEDURE — 99152 PR MOD CONSCIOUS SEDATION, SAME PHYS, 5+ YRS, FIRST 15 MIN: ICD-10-PCS | Mod: ,,, | Performed by: INTERNAL MEDICINE

## 2022-05-20 PROCEDURE — C1724 CATH, TRANS ATHEREC,ROTATION: HCPCS | Performed by: INTERNAL MEDICINE

## 2022-05-20 PROCEDURE — C1894 INTRO/SHEATH, NON-LASER: HCPCS | Performed by: INTERNAL MEDICINE

## 2022-05-20 PROCEDURE — C1725 CATH, TRANSLUMIN NON-LASER: HCPCS | Performed by: INTERNAL MEDICINE

## 2022-05-20 PROCEDURE — 63600175 PHARM REV CODE 636 W HCPCS: Performed by: INTERNAL MEDICINE

## 2022-05-20 PROCEDURE — 99153 MOD SED SAME PHYS/QHP EA: CPT | Performed by: INTERNAL MEDICINE

## 2022-05-20 PROCEDURE — 37225 PR FEM/POPL REVAS W/ATHERECTOMY: ICD-10-PCS | Mod: LT,,, | Performed by: INTERNAL MEDICINE

## 2022-05-20 PROCEDURE — 27201423 OPTIME MED/SURG SUP & DEVICES STERILE SUPPLY: Performed by: INTERNAL MEDICINE

## 2022-05-20 PROCEDURE — 37225 PR FEM/POPL REVAS W/ATHERECTOMY: CPT | Mod: LT,,, | Performed by: INTERNAL MEDICINE

## 2022-05-20 RX ORDER — ACETAMINOPHEN 325 MG/1
650 TABLET ORAL EVERY 4 HOURS PRN
Status: DISCONTINUED | OUTPATIENT
Start: 2022-05-20 | End: 2022-05-20 | Stop reason: HOSPADM

## 2022-05-20 RX ORDER — MIDAZOLAM HYDROCHLORIDE 1 MG/ML
INJECTION, SOLUTION INTRAMUSCULAR; INTRAVENOUS
Status: DISCONTINUED | OUTPATIENT
Start: 2022-05-20 | End: 2022-05-20 | Stop reason: HOSPADM

## 2022-05-20 RX ORDER — HEPARIN SODIUM 200 [USP'U]/100ML
INJECTION, SOLUTION INTRAVENOUS
Status: DISCONTINUED | OUTPATIENT
Start: 2022-05-20 | End: 2022-05-20 | Stop reason: HOSPADM

## 2022-05-20 RX ORDER — LIDOCAINE HYDROCHLORIDE 10 MG/ML
INJECTION, SOLUTION EPIDURAL; INFILTRATION; INTRACAUDAL; PERINEURAL
Status: DISCONTINUED | OUTPATIENT
Start: 2022-05-20 | End: 2022-05-20 | Stop reason: HOSPADM

## 2022-05-20 RX ORDER — IODIXANOL 320 MG/ML
INJECTION, SOLUTION INTRAVASCULAR
Status: DISCONTINUED | OUTPATIENT
Start: 2022-05-20 | End: 2022-05-20 | Stop reason: HOSPADM

## 2022-05-20 RX ORDER — ONDANSETRON 4 MG/1
8 TABLET, ORALLY DISINTEGRATING ORAL EVERY 8 HOURS PRN
Status: DISCONTINUED | OUTPATIENT
Start: 2022-05-20 | End: 2022-05-20 | Stop reason: HOSPADM

## 2022-05-20 RX ORDER — FENTANYL CITRATE 50 UG/ML
INJECTION, SOLUTION INTRAMUSCULAR; INTRAVENOUS
Status: DISCONTINUED | OUTPATIENT
Start: 2022-05-20 | End: 2022-05-20 | Stop reason: HOSPADM

## 2022-05-20 RX ORDER — SODIUM CHLORIDE 9 MG/ML
INJECTION, SOLUTION INTRAVENOUS CONTINUOUS
Status: ACTIVE | OUTPATIENT
Start: 2022-05-20 | End: 2022-05-20

## 2022-05-20 RX ORDER — HEPARIN SODIUM 1000 [USP'U]/ML
INJECTION, SOLUTION INTRAVENOUS; SUBCUTANEOUS
Status: DISCONTINUED | OUTPATIENT
Start: 2022-05-20 | End: 2022-05-20 | Stop reason: HOSPADM

## 2022-05-20 RX ORDER — DIPHENHYDRAMINE HYDROCHLORIDE 50 MG/ML
INJECTION INTRAMUSCULAR; INTRAVENOUS
Status: DISCONTINUED | OUTPATIENT
Start: 2022-05-20 | End: 2022-05-20 | Stop reason: HOSPADM

## 2022-05-20 RX ORDER — VERAPAMIL HYDROCHLORIDE 2.5 MG/ML
INJECTION, SOLUTION INTRAVENOUS
Status: DISCONTINUED | OUTPATIENT
Start: 2022-05-20 | End: 2022-05-20 | Stop reason: HOSPADM

## 2022-05-20 NOTE — PLAN OF CARE
Dr Medina notified of pt's HCT from yesterday.  Dr Medina states that he will come and speak to the pt about this morning procedure.  Will wait on MD prior to pre-op.

## 2022-05-20 NOTE — Clinical Note
3 ml of contrast were injected throughout the case. 47 mL of contrast was the total wasted during the case. 50 mL was the total amount used during the case.

## 2022-05-20 NOTE — Clinical Note
An angiography of the  left lower extremity was performed with the catheter and hand injected with 0 mL of contrast POST INTERVENTION  CO2 CONTRAST USED

## 2022-05-20 NOTE — H&P
Patient ID:  Domingo Gross is a 61 y.o. male who presents for follow up of Peripheral Arterial Disease, Coronary Artery Disease, Claudication, Hyperlipidemia, and Hypertension        HPI:                  Domingo Gross 61 y.o. male is here follow up CAD, HTN, HLP, PAF in NSR, edema, and PAD with winsome IIb claudication. He is s/p bilateral SFA, GRUPO, EIA, and R CFA revascularization. He has seen nephrology and later recommended a renal biopsy but he was reasonably reluctant to stop DAPT because of his history of cardiac arrest. His peripheral vascular intervention in 12/2021 was cancelled due to severe anemia with a H/H that required a transfusion with subsequent ongoing intervention by heme/onc. He has a GI work up for anemia was normal. His H/H has improved with heme/onc care.          He was admitted 10/4/2019 for cardiac arrest. He was fully rescued. Initial rhythm was afib with rvr. He was not taken immediately to the cath lab because of ARF + hypokalemia. He had a diagnostic angiogram which revealed patent LM, LAD, RCA stent, and new ostial LCX lesion 99% with R to L collaterals. He had PCI with REZA after his renal function returned to baseline.            8/19/2021 with CP HTN urgency with . Added nifedipine and BP has been stable. No CP           He was admitted on 2/1/2022. His peripheral revascularization procedure was cancelled because of hyperkalemia and denial by insurance company requiring a peer to peer. Both issues have been resolved. He is ready to proceed with revascularization.            ELISABETH with stress 5/2017:              R 1.01 to 0.44              L 0.92 to 0.45               After 6 minutes ambulation        CTA 5/2017:                    Patent distal aorta and bilateral GRUPO/EIA stents              L EIA with de shawna 90% stenosis              L SFA 80% with 3 vessel run off                 R EIA/CFA with moderate disease              R SFA 80% stenosis with 3 vessel  run off           Peripheral angiogram with intervention 6/2017         Patent bilateral GRUPO/EIA stents.    De shawna 80% left EIA stenosis treated with 9.0 x 80 mm Lifestar  stent post dilated with 8.0 mm balloon.    Bilateral 70-80% SFA stenosis with 3 vessel run off.           3/2018        L SFA intervention for claudication              75% L SFA with 3 vessel run off              7 mmHg resting and 33 mmHg hyperemic mean gradient                                L CFA antegrade access              PTA with 6.0 x 150 mm              PTA with 6.0 x 150 mm Lutonix              3 vessel run off               4/13/2018                    S/p R SFA PTA for winsome IIb claudication              R CFA antegrade access                    R CFA with 50% stenosis-heavily calcified lesion                          Baseline /90                    R SFA with 70% stenosis with a significant resting and hyperemic mean gradient               3 vessel run off                        PTA of R SFA with 6.0 x 150 + 6.0 x 60 mm Lutonix DCB           5/2/2018              Patent arteries post revascularization           2/2019                 R 0.84 to 0.27              L 0.90 to 0.66              After ambulation              Severe R calf claudication              Nuclear stress test 2/2019                 + ECG with 2 mm ST depression              No wall motion abnormalities              Test stopped at 6 minutes, 7 METs, 86% predicted heart rate              Stopped because of R leg claudication + ECG changes              S/p PCI RCA and LAD with REZA 3/2019                    RCA 3.5 x 22 mm Resolute REZA              LAD 2.5 x 30 and 2.5 x 25 mm Resolute REZA        Peripheral aortogram 5/10/2019                 R CFA 95% stenosis              3 vessel run off           Seen by Dr. Giron for R CFA endarterectomy but preferable should be off plavix for at least 5 days. He has a risk for stent thrombosis with premature  interruption of DAPT. He later had R CFA atherectomy + PTA with DCB.               10/11/2019 for cardiac arrest         S/p staged LCX PCI                    L CFA access              IVUS guided PCI              3.0 x 15 mm Resolute REZA post dilated with 3.5 NC balloon            PET stress 2/2020                 No ischemia        Echo 2/2021                            EF 55%              Normal diastolic fn              Normal RV fn              Mild MR/TR/OK                      Arterial US 7/2021                 Bilateral SFA disease > 70        Venous US 7/2021                 No DVT              No superficial reflux              R POP vein reflux > 500 msec     Echo 8/2021                            Normal EF              Normal RV fn              Mild-mod MR              Mod OK              PASP 26 mmHg           ELISABETH 11/2021                  Resting ELISABETH right 0.84 and left 0.83              Exercise ELISABETH right 0.39 and left 0.32 after 3.5 minutes ambulation              TBI right 0.58 and left 0.48              US 11/2021                            Bilateral mid SFA high grade disease                 Patient Active Problem List     Diagnosis Date Noted    Cardiac arrest 10/04/2019       Priority: High    Atherosclerosis of native artery of both lower extremities with intermittent claudication 03/02/2018       Priority: High    Atherosclerosis of native artery of right lower extremity with intermittent claudication 04/13/2018       Priority: Low    Hyperkalemia 02/01/2022    Anemia due to stage 4 chronic kidney disease 12/15/2021    Anemia in stage 3b chronic kidney disease 10/26/2021    Anemia 08/20/2021    CKD (chronic kidney disease) stage 4, GFR 15-29 ml/min 08/20/2021    COPD (chronic obstructive pulmonary disease) 08/20/2021    Vitamin D deficiency 10/03/2020    Nuclear sclerotic cataract of left eye 07/02/2020    Nuclear sclerosis, bilateral 06/08/2020    Nephrotic range proteinuria  04/06/2020    Hypertensive kidney disease with stage 3 chronic kidney disease 04/06/2020    Atrial fibrillation with RVR 10/04/2019    Acute renal failure 10/04/2019    Bilateral carotid artery stenosis      Coronary artery disease involving native coronary artery of native heart without angina pectoris 03/29/2019             3/29/2019     S/p LHC via R radial           LM, LAD, and LCX are patent with luminal irregularities  RCA mid 95% calcified lesion  Normal EF with LVEDP 8 mmHg           PCI of mid LAD with 2.5 x 30 and 2.5 x 15 mm Resolute REZA  PCI of proximal RCA to treat guide dissection with 3.5 x 22 Resolute REZA           10/11/2019 for cardiac arrest         S/p staged LCX PCI                    L CFA access              IVUS guided PCI              3.0 x 15 mm Resolute REZA post dilated with 3.5 NC balloon              Abnormal cardiovascular stress test 02/27/2019             Nuclear stress test 2/2019                 + ECG with 2 mm ST depression              No wall motion abnormalities              Test stopped at 6 minutes, 7 METs, 86% predicted heart rate              Stopped because of R leg claudication + ECG changes                Venous insufficiency of both lower extremities 06/04/2018    Localized edema 06/04/2018    Left knee pain 01/19/2016    Pain of left lower extremity 01/19/2016    Acute pain of left knee 12/11/2015    Hyperlipidemia 06/12/2015    DJD (degenerative joint disease), lumbar 05/27/2015    Lumbar facet arthropathy 05/27/2015    DDD (degenerative disc disease) 05/27/2015    Spondylosis without myelopathy 05/27/2015    Limb pain 11/21/2014    History of back injury 11/21/2014       20 years ago a bag fell on his back at work       Myalgia 11/21/2014    Claudication in peripheral vascular disease 06/13/2014    PAD (peripheral artery disease) 05/21/2014 6/13/2014      1. Three vessel runoff below the knee bilaterally.  2. Severe disease of the terminal  aorta.  3. Successful PTAS.  4. Bilateral common iliac reconstruction with 8 x 39 mm stent extending in the aorta  5. Right common illiac was treated with an overlapping 9 x 60 mm SES   6. Left common iliac was treated with an overlapping 8 x 80 mm SES down to external iliac  7. All stents were post dilated with 9.0 x 60 mm balloons  8. Moderate bilateral SFA disease  9. Chronically occluded left internal iliac artery           6/12/2015        1. 80% mid left SFA with a 20 mmHg resting mean gradient  2. Atherectomy with 2.2 PIE Softwarenix Volcano catheter  3. PTA with 6.0 x 100 mm Lutonix drug coated balloon           6/9/2017        Patent bilateral GRUPO/EIA stents  L EIA PTAS 9.0 x 80 mm Life stent post dilated with 8.0 mm balloon  Bilateral 70-80% SFA stenosis with 3 vessel run off              3/2018        L SFA intervention for claudication              75% L SFA with 3 vessel run off              7 mmHg resting and 33 mmHg hyperemic mean gradient                                L CFA antegrade access              PTA with 6.0 x 150 mm              PTA with 6.0 x 150 mm Lutonix              3 vessel run off               4/13/2018                    S/p R SFA PTA for winsome IIb claudication              R CFA antegrade access                    R CFA with 50% stenosis-heavily calcified lesion                          Baseline /90                    R SFA with 70% stenosis with a significant resting and hyperemic mean gradient               3 vessel run off                        PTA of R SFA with 6.0 x 150 + 6.0 x 60 mm Lutonix DCB           5/2/2018              Patent arteries post revascularization           2/2019                 R 0.84 to 0.27              L 0.90 to 0.66              After ambulation              Severe R calf claudication           Peripheral angiogram 5/10/2019                               95% R CFA stenosis; heavily calcified               Patent SFA, POP + 3 vessel run  off        Peripheral intervention 2019     S/p right CFA revascularization for winsome IIb claudication                    Assisted JOSY approach              L radial access for visualization              5-6 slender R PT access for delivery of therapy                                  Atherectomy with SilverHawk catheter              PTA with 7.0 x 40 mm Lutonix DCB                Atherosclerosis of leg with intermittent claudication 2014       Winsome IIB       Elevated TSH 2013    HTN (hypertension) 2013    Elevated fasting blood sugar 2013               Right Arm BP - Sittin/67  Left Arm BP - Sittin/              LAST HbA1c        Lab Results   Component Value Date     HGBA1C 5.7 (H) 10/28/2019         Lipid panel        Lab Results   Component Value Date     CHOL 105 (L) 2021     CHOL 162 03/15/2021     CHOL 211 (H) 2019            Lab Results   Component Value Date     HDL 44 2021     HDL 43 03/15/2021     HDL 53 2019            Lab Results   Component Value Date     LDLCALC 51.0 (L) 2021     LDLCALC 98.0 03/15/2021     LDLCALC 123.8 2019            Lab Results   Component Value Date     TRIG 50 2021     TRIG 105 03/15/2021     TRIG 171 (H) 2019            Lab Results   Component Value Date     CHOLHDL 41.9 2021     CHOLHDL 26.5 03/15/2021     CHOLHDL 25.1 2019               Review of Systems   Constitutional: Negative for diaphoresis, night sweats, weight gain and weight loss.   HENT: Negative for congestion.    Eyes: Negative for blurred vision, discharge and double vision.   Cardiovascular: Negative for chest pain, claudication, cyanosis, dyspnea on exertion, irregular heartbeat, leg swelling, near-syncope, orthopnea, palpitations, paroxysmal nocturnal dyspnea and syncope.   Respiratory: Negative for cough, shortness of breath and wheezing.    Endocrine: Negative for cold intolerance, heat  intolerance and polyphagia.   Hematologic/Lymphatic: Negative for adenopathy and bleeding problem. Does not bruise/bleed easily.   Skin: Negative for dry skin and nail changes.   Musculoskeletal: Negative for arthritis, back pain, falls, joint pain, myalgias and neck pain.   Gastrointestinal: Negative for bloating, abdominal pain, change in bowel habit and constipation.   Genitourinary: Negative for bladder incontinence, dysuria, flank pain, genital sores and missed menses.   Neurological: Negative for aphonia, brief paralysis, difficulty with concentration, dizziness and weakness.   Psychiatric/Behavioral: Negative for altered mental status and memory loss. The patient does not have insomnia.    Allergic/Immunologic: Negative for environmental allergies.         Objective:   Physical Exam  Constitutional:       Appearance: He is well-developed.      Interventions: He is not intubated.  HENT:      Head: Normocephalic and atraumatic.      Right Ear: External ear normal.      Left Ear: External ear normal.   Eyes:      General: No scleral icterus.        Right eye: No discharge.         Left eye: No discharge.      Conjunctiva/sclera: Conjunctivae normal.      Pupils: Pupils are equal, round, and reactive to light.   Neck:      Thyroid: No thyromegaly.      Vascular: Normal carotid pulses. No carotid bruit, hepatojugular reflux or JVD.      Trachea: No tracheal deviation.   Cardiovascular:      Rate and Rhythm: Normal rate and regular rhythm.  No extrasystoles are present.     Chest Wall: PMI is not displaced.      Pulses: No midsystolic click.           Carotid pulses are 2+ on the right side and 2+ on the left side.       Radial pulses are 2+ on the right side and 2+ on the left side.        Femoral pulses are 2+ on the right side and 2+ on the left side.       Popliteal pulses are 2+ on the right side and 2+ on the left side.        Dorsalis pedis pulses are 1+ on the right side and 2+ on the left side.         Posterior tibial pulses are 1+ on the right side and 2+ on the left side.      Heart sounds: S1 normal and S2 normal. Heart sounds not distant. No murmur heard.  No friction rub. No gallop. No S3 sounds.       Comments:       Biphasic R DP and weak but biphasic R PT doppler signals       Biphasic L DP and PT doppler signals          Pulmonary:      Effort: Pulmonary effort is normal. No tachypnea, bradypnea, accessory muscle usage or respiratory distress. He is not intubated.      Breath sounds: Normal breath sounds. No stridor. No decreased breath sounds, wheezing or rales.   Chest:      Chest wall: No tenderness.   Abdominal:      General: There is no distension or abdominal bruit.      Palpations: There is no mass or pulsatile mass.      Tenderness: There is no abdominal tenderness. There is no guarding or rebound.   Musculoskeletal:         General: No tenderness. Normal range of motion.      Cervical back: Normal range of motion and neck supple.   Lymphadenopathy:      Cervical: No cervical adenopathy.   Skin:     General: Skin is warm.      Coloration: Skin is not pale.      Findings: No erythema or rash.   Neurological:      Mental Status: He is alert and oriented to person, place, and time.      Cranial Nerves: No cranial nerve deficit.      Coordination: Coordination normal.      Deep Tendon Reflexes: Reflexes are normal and symmetric.   Psychiatric:         Behavior: Behavior normal.         Thought Content: Thought content normal.         Judgment: Judgment normal.      Physical findings from previous visit         Assessment:      1. Atherosclerosis of native artery of both lower extremities with intermittent claudication    2. Mixed hyperlipidemia    3. Primary hypertension    4. Venous insufficiency of both lower extremities    5. Coronary artery disease involving native coronary artery of native heart without angina pectoris    6. CKD (chronic kidney disease) stage 4, GFR 15-29 ml/min    7. Anemia due  to stage 4 chronic kidney disease          Plan:         He will proceed with revascularization for lifestyle limiting claudication despite walking program. He is unable to perform his job as a . Procedure will be done with CO2 angiography + EVUS + IVUS in view of CKD to prevent ANDREA.         R radial access for diagnostic then determine intervention plan. Left leg is more symptomatic than the right        Risks, benefits and alternatives of peripheral catheterization and possible intervention were discussed with the patient. All questions were answered and informed consent obtained.      I discussed the importance of compliance with dual antiplatelet therapy with the patient to prevent acute or late stent thrombosis with premature discontinuation of the therapy.           Medical therapy for CAD  DAPT with aspirin + plavix  Intense statin therapy  Arb  Pletal         Target LDL < 70  Low salt diet  Weight loss           Continue with current medical plan and lifestyle changes.  Return sooner for concerns or questions. If symptoms persist go to the ED  I have reviewed all pertinent data on this patient   Referral for cataract disease           Follow up as scheduled. Return sooner for concerns or questions  He expressed verbal understanding and agreed with the plan           Greater than 50% of the visit of 45 minutes was spent counseling, educating, and coordinating the care of the patient.           Greater than 50% of the visit of 45 minutes was spent counseling, educating, and coordinating the care of the patient.        -In today's visit, at least 4 established conditions that pose a risk to life or bodily function have been addressed and the conditions are severe.     -In today's visit, monitoring for drug toxicity was accomplished.           Follow up as scheduled. Return sooner for concerns or questions                Patient's Medications   New Prescriptions     No medications on file    Previous Medications     ASPIRIN (ASPIR-81 ORAL)    Take 81 mg by mouth once daily.     CARVEDILOL (COREG) 25 MG TABLET    Take 2 tablets (50 mg total) by mouth 2 (two) times daily with meals.     CLOPIDOGREL (PLAVIX) 75 MG TABLET    Take 1 tablet (75 mg total) by mouth once daily.     COENZYME Q10 100 MG CAPSULE    Take 100 mg by mouth once daily.     DOXAZOSIN (CARDURA) 4 MG TABLET    TAKE 1 TABLET(4 MG) BY MOUTH EVERY EVENING     EPLERENONE (INSPRA) 50 MG TAB    Take 1 tablet (50 mg total) by mouth once daily.     GABAPENTIN (NEURONTIN) 300 MG CAPSULE    Take 1 capsule (300 mg total) by mouth 3 (three) times daily as needed (back pain).     HYDRALAZINE (APRESOLINE) 100 MG TABLET    Take 1 tablet (100 mg total) by mouth 2 (two) times a day.     NIFEDIPINE (PROCARDIA-XL) 60 MG (OSM) 24 HR TABLET    Take 1 tablet (60 mg total) by mouth once daily.     ROSUVASTATIN (CRESTOR) 40 MG TAB    Take 1 tablet (40 mg total) by mouth every evening.   Modified Medications     No medications on file   Discontinued Medications     No medications on file                    S/p left transradial aortogram with run off 4/2022                      Patent aorta and bilateral iliac stents                70% and heavily calcified right CFA disease                 60% mid right SFA disease with 3 vessel run off                70% left CFA disease                 90% proximal and mid left SFA with 3 vessel run off                 Case was done with CO2 angiography in view of CKD        Plan:                      IVUS revascularization with atherectomy + DCB                Assisted JOSY approach via retrograde PT access                Left radial access for visualization          Risks, benefits and alternatives of peripheral catheterization and possible intervention were discussed with the patient. All questions were answered and informed consent obtained.     I discussed the importance of compliance with dual antiplatelet therapy with  the patient to prevent acute or late stent thrombosis with premature discontinuation of the therapy.

## 2022-05-20 NOTE — DISCHARGE SUMMARY
[facility] ([facility])  Discharge Note  Short Stay    Procedure(s) (LRB):  PTA, PERIPHERAL VESSEL (Left)  ATHERECTOMY, PERIPHERAL BLOOD VESSEL (N/A)  ULTRASOUND, INTRAVASCULAR (N/A)    OUTCOME: Patient tolerated treatment/procedure well without complication and is now ready for discharge.    DISPOSITION: Home or Self Care    FINAL DIAGNOSIS:  Atherosclerosis of native artery of both lower extremities with intermittent claudication    FOLLOWUP: In clinic    DISCHARGE INSTRUCTIONS:    Discharge Procedure Orders   CBC W/ AUTO DIFFERENTIAL   Standing Status: Future Number of Occurrences: 1 Standing Exp. Date: 07/15/23     BASIC METABOLIC PANEL   Standing Status: Future Number of Occurrences: 1 Standing Exp. Date: 07/15/23          S/p R CFA + SFA IVUS guided atherectomy + PTA with DCB for Stevie IIb claudication       L radial + L PT retrograde access           Full report to follow           Current Discharge Medication List      CONTINUE these medications which have NOT CHANGED    Details   ASPIRIN (ASPIR-81 ORAL) Take 81 mg by mouth once daily.      carvediloL (COREG) 25 MG tablet Take 2 tablets (50 mg total) by mouth 2 (two) times daily with meals.  Qty: 360 tablet, Refills: 1    Comments: Dose increase. Please discontinue duplicate prescriptions. Thanks!  Associated Diagnoses: Essential hypertension      clopidogreL (PLAVIX) 75 mg tablet Take 1 tablet (75 mg total) by mouth once daily.  Qty: 30 tablet, Refills: 11      coenzyme Q10 100 mg capsule Take 100 mg by mouth once daily.      doxazosin (CARDURA) 4 MG tablet TAKE 1 TABLET(4 MG) BY MOUTH EVERY EVENING  Qty: 90 tablet, Refills: 3    Associated Diagnoses: Essential hypertension      eplerenone (INSPRA) 50 MG Tab Take 1 tablet (50 mg total) by mouth once daily.  Qty: 90 tablet, Refills: 3    Comments: Replacement for spironolactone. Thanks!  Associated Diagnoses: Essential hypertension      gabapentin (NEURONTIN) 300 MG capsule Take 1 capsule (300 mg total)  by mouth 3 (three) times daily as needed (back pain).  Qty: 90 capsule, Refills: 11    Associated Diagnoses: Acute bilateral back pain, unspecified back location      hydrALAZINE (APRESOLINE) 100 MG tablet Take 1 tablet (100 mg total) by mouth 2 (two) times a day.  Qty: 180 tablet, Refills: 3    Comments: .  Associated Diagnoses: Essential hypertension      mupirocin (BACTROBAN) 2 % ointment Apply topically 3 (three) times daily.  Qty: 1 each, Refills: 3    Associated Diagnoses: Skin lesion      NIFEdipine (PROCARDIA-XL) 60 MG (OSM) 24 hr tablet Take 1 tablet (60 mg total) by mouth once daily.  Qty: 90 tablet, Refills: 1    Comments: Please delete all prior prescriptions with the same name and strength, including those on hold.  Associated Diagnoses: Essential hypertension      !! rosuvastatin (CRESTOR) 40 MG Tab TAKE 1 TABLET(40 MG) BY MOUTH EVERY EVENING  Qty: 90 tablet, Refills: 3      !! rosuvastatin (CRESTOR) 40 MG Tab Take 1 tablet (40 mg total) by mouth every evening.  Qty: 90 tablet, Refills: 3    Associated Diagnoses: Mixed hyperlipidemia       !! - Potential duplicate medications found. Please discuss with provider.             TIME SPENT ON DISCHARGE: 35 minutes

## 2022-05-20 NOTE — Clinical Note
A percutaneous stick to the left posterior tibial artery was performed. Ultrasound guidance was used to obtain access.

## 2022-05-20 NOTE — PLAN OF CARE
Patient discharged to home as ordered after 2 hour recovery per . Pt is AAOx4, VSS, denies any pain.  All discharge instructions and printed materials reviewed and given to patient.   Patient instructed on follow-up appointment as needed.   Prescriptions delivered to bedside by pharmacy and reviewed with patient.   Patient verbalized understanding and agreement with all discharge instructions given.     Right radial  gauze and tegaderm dressing c.d.i. Right foot gauze and tegaderm dressing c.d.i. No bleeding or hematoma noted around site. Site and surrounding area is soft.    Palpated +2 mag radial pulses. Dopplered mag pedal pulses.     Pt voided without difficulty prior to departure Pt ate plate from cafeteria with no difficulty. No n/v.   Pt got dressed and moving around without difficulty and no distress noted.  Pt in w/c. Right forearm 20 gauge iv dc'd as ordered with tip intact. Gauze and koban dressing applied c.d.i.  Pt discharged home via wheelchair to private vehicle with wife.

## 2022-05-20 NOTE — DISCHARGE INSTRUCTIONS
Discharge Instructions for Cardiac Catheterization  Cardiac catheterization is a procedure to look for blocked areas in the blood vessels that send blood to the heart. A thin, flexible tube (catheter) is put in a blood vessel in your groin or arm. The healthcare provider injects contrast fluid into your blood, which then flows to your heart. X-rays pictures are taken of your heart. Your provider will review the results with you. Be sure to ask any questions you have before you leave. This sheet will help you take care of yourself at home.  Home care  Only do light and easy activities for the next 2 to 3 days. Ask for help with chores and errands while you recover. Have someone drive you to your appointments.  Don't lift anything heavy for a while. Your healthcare team will tell you when it's safe to lift again.  Ask your healthcare team when you can expect to return to work. Unless your job involves lifting, you may be able to return to your normal activities within a couple of days.  Take your medicines as directed. Don't skip doses.  Drink 6 to 8 glasses of water a day. This is to help flush the contrast dye out of your body. Call your healthcare team if your urine has any change in color.  Take your temperature each day for 7 days. If you feel cold and clammy or start sweating, take your temperature right away and call your healthcare team.  Check your incisions every day for signs of infection. These include redness, swelling, and drainage. It is normal to have a small bruise or bump where the catheter was inserted. A bruise that is getting larger is not normal and should be reported to your healthcare team. If you see blood forming in the incision, call your healthcare team. Go to the emergency department if you have uncontrolled bleeding from the artery site. This is especially true if you take medicines that make it difficult for your blood to clot. Examples are aspirin, clopidogrel, and warfarin.  Eat a  healthy diet. Make sure it is low in fat, salt, and cholesterol. Ask your healthcare team for diet information.  Stop smoking. Enroll in a stop-smoking program or ask your healthcare team for help. Stop-smoking programs can be life saving.  Exercise as your healthcare team tells you to. Your healthcare team may recommend you start a cardiac rehabilitation program. Cardiac rehab is an exercise program in which trained healthcare staff watch your progress and stress on your heart while you exercise. Ask your team how to enroll.  Don't swim or take baths until your healthcare team says its OK. You can shower the day after the procedure. Keep the site clean and dry. This keeps the incision from getting wet and infected until the skin and artery can heal.  Follow-up care  Make a follow-up appointment as advised by our staff. It's common to have a follow-up appointment 2 to 4 weeks after an angioplasty or coronary stent procedure.  Make a yearly appointment, too. This is to make sure you are still doing well and not having any new symptoms.  Don't wait for a follow-up appointment if your medicines aren't working or you are having heart-related symptoms.  When to seek medical care  Call your healthcare provider right away if you have any of the following:  Chest pain  Constant or increasing pain or numbness in your leg  Fever of 100.4°F (38.0°C) or higher, or as directed by your healthcare provider  Symptoms of infection. These include redness, swelling, drainage, or warmth at the incision site.  Shortness of breath  A leg that feels cold or appears blue  Bleeding, bruising, or a lot of swelling where the catheter was inserted  Blood in your urine  Black or tarry stools  Any unusual bleeding   Date Last Reviewed: 10/1/2016  © 1343-5957 [employer]. [address] [place] [address], [place] [address]. All rights reserved. This information is not intended as a substitute for professional medical care. Always follow your  healthcare professional's instructions. Peripheral Angioplasty  Peripheral angioplasty is a procedure that helps open blockages in peripheral arteries. These vessels carry blood to your lower body, legs, and arms.  Talk with your healthcare provider about the risks and complications of angioplasty.   Before the procedure  Recommendations of what to do include:   Tell your healthcare provider about all medicines you take including over-the-counter medicines, herbal supplements, and any allergies you may have, especially to iodine.   Follow any directions youre given for not eating or drinking before the procedure.  Arrange for a family member or friend to drive you home.  During the procedure    You may get medicine through an IV (intravenous) line to relax you. An injection will numb the site your healthcare provider will use to perform the procedure. The site used is generally an artery in the groin although the wrist or arm may be used. The healthcare provider makes a tiny skin cut (incision) near an artery in your groin.  Your healthcare provider puts a thin, flexible tube (catheter) through the incision. He or she then threads the catheter into the affected artery while using X-ray monitoring.  Contrast dye is injected into the catheter. X-rays (angiography) are taken.  A tiny balloon is pushed through the catheter to the blockage. Your healthcare provider inflates and deflates the balloon a few times. This compresses the plaque. A small metal or mesh tube (stent) may be put in the artery to help keep it open. The balloon and catheter are then taken out.  After the procedure  Youll be taken to a recovery area. Pressure is put on the insertion site for about 30 to 45 minutes. Your healthcare provider will tell you how long to lie down and keep the insertion site still. You will go home that day or spend the night in the facility. You will be given aftercare instructions for when you go home.   Call your  healthcare provider  Call your healthcare provider right away or get immediate medical attention if:  You notice a lump or bleeding at the site where the catheter was inserted  You feel increasing pain at the insertion site  You become lightheaded or dizzy  You have leg pain or numbness  You have a leg that turns blue or feels cold  You develop a fever greater than 101.5°F (38.6°C).  You develop a skin rash  You cannot urinate after the procedure  You have chest pain or shortness of breath   Date Last Reviewed: 5/1/2016 © 2000-2017 [employer]. [address] [place] [id number]. All rights reserved. This information is not intended as a substitute for professional medical care. Always follow your healthcare professional's instructions.         Patient transfered to cath lab bay # via stretcher with side rails up x2. Pt drowsy but / AAOx4 and able to follow commands. Pt is stable when connecting to cardiac monitors. VSS.     Left / Right radial vasc band in place with **cc of air in band. Site is CDI. No redness, bruising, or hematoma noted around site. +2 mag radial pulses palpated. Dopplered / Palpated mag pedal pulses. Skin normal in color and warm to touch, <3 sec cap refill.  Fall risk precautions given and patient acknowledges.  AIDET completed to pt. Updated pt's *** in sx waiting room / via phone. Will continue to monitor patient.  Understanding Transradial Cardiac Catheterization    Cardiac catheterization (cardiac cath) is a common, non-surgical procedure. During the procedure, your doctor will insert a long, thin tube (catheter) into an artery and move it up into your heart. Transradial means the catheter is inserted into an artery in the wrist (the radial artery). This procedure can be used to diagnose and treat certain heart problems.  Why do I need a transradial cardiac cath?  You may need a cardiac cath if signs indicate a problem with your heart. These may include:  Symptoms of  chest pain, tightness, or heaviness (known as angina). This is a common symptom of blocked heart arteries, known as coronary artery disease.  Symptoms of weakness, dizziness, trouble breathing, or swollen legs or feet. These may be symptoms of a problem with a heart valve or the heart muscle.  Other test results show heart problems. Tests may include stress tests, heart scans, and echocardiography.  During a cardiac cath, your doctor can see the condition of the coronary arteries and heart valves. He or she can also check how well the heart pumps and the flow of blood through the heart. Your doctor can also measure pressures and take blood samples. And, if needed, he or she can open blocked arteries. This can help reduce symptoms of angina.  Cardiac cath is often done using a catheter inserted into an artery in the groin. During transradial cardiac cath, the catheter is inserted into an artery in the wrist. This can mean less bleeding and a faster recovery. Some people may have blockages in the groin arteries as well as in the heart arteries, making it difficult to reach the heart. The transradial approach can be used to get around this problem.   What happens during a transradial cardiac cath?  The procedure is done in the hospital or a surgery center. First, an IV line is put in your arm or hand to deliver fluids and medicines. You will likely be given medicine to relax you and make you drowsy. When the procedure begins:  You lie on an X-ray table.  The skin over the insertion site in your wrist is numbed.  The doctor makes a tiny puncture or incision into the artery in the wrist. He or she then inserts a catheter and threads it through the blood vessel into your heart.    The doctor may inject a contrast fluid through the catheter into the arteries. This fluid makes the arteries show up better on X-rays.  Tests may be done to check the condition of your heart and arteries. If needed, the doctor can clear  blockages in the arteries or do other repairs.  When the doctor is finished, he or she will remove the catheter and put direct pressure on the site to prevent bleeding.  You will stay for a time to recover, and then go home.  What are the risks of transradial cardiac cath?  These include:  Bleeding, bruising, infection, or blood clots  Damage to the radial artery that may cause injury to the hand  Allergic reaction to the contrast fluid  Abnormal heartbeat (arrhythmia)  Damage to blood vessels or tissues  Kidney damage or failure  The need for emergency heart surgery  Heart attack, stroke, or death  Date Last Reviewed: 5/1/2016  © 1826-2686 [employer]. [address] [place] [address], [place] [address]. All rights reserved. This information is not intended as a substitute for professional medical care. Always follow your healthcare professional's instructions. *.

## 2022-05-20 NOTE — PLAN OF CARE
Pt arrived independently / with family from home, ambulates with/out assistance. Patient lying in bed with no complaints of pain or distress noted. Monitors applied. VSS, AAOx4.  Yellow non-skid socks placed on patient. Fall risk reviewed with patient. Procedure and recovery process explained to patient and all questions answered.  Patient verbalized understanding, denies questions. Will continue to monitor for safety and needs.

## 2022-05-20 NOTE — Clinical Note
The catheter was reinserted into the  proximal left superficial femoral artery. Intravascular ultrasound performed.

## 2022-05-20 NOTE — Clinical Note
The catheter was inserted into the  proximal left superficial femoral artery. Intravascular ultrasound performed.

## 2022-05-20 NOTE — Clinical Note
The site was marked. The groin, left radial and left foot was prepped. The site was prepped with ChloraPrep. The site was clipped. The patient was draped. The patient was positioned supine.

## 2022-05-20 NOTE — BRIEF OP NOTE
S/p R CFA + SFA IVUS guided atherectomy + PTA with DCB for Stevie IIb claudication       L radial + L PT retrograde access           Full report to follow

## 2022-05-20 NOTE — Clinical Note
Torque device attached to wire , inflation device used to inflate balloon, guidewire introducer used to shape guide wire during procedure.

## 2022-05-23 ENCOUNTER — PATIENT MESSAGE (OUTPATIENT)
Dept: CARDIOLOGY | Facility: CLINIC | Age: 62
End: 2022-05-23
Payer: MEDICAID

## 2022-05-23 LAB
POC ACTIVATED CLOTTING TIME K: 237 SEC (ref 74–137)
POC ACTIVATED CLOTTING TIME K: 237 SEC (ref 74–137)
POC ACTIVATED CLOTTING TIME K: 243 SEC (ref 74–137)
POC ACTIVATED CLOTTING TIME K: 249 SEC (ref 74–137)
POC ACTIVATED CLOTTING TIME K: 255 SEC (ref 74–137)
POC ACTIVATED CLOTTING TIME K: 261 SEC (ref 74–137)
POC ACTIVATED CLOTTING TIME K: 261 SEC (ref 74–137)
SAMPLE: ABNORMAL

## 2022-05-26 DIAGNOSIS — R80.9 NEPHROTIC RANGE PROTEINURIA: ICD-10-CM

## 2022-05-26 DIAGNOSIS — E55.9 VITAMIN D DEFICIENCY: ICD-10-CM

## 2022-05-26 DIAGNOSIS — E78.00 PURE HYPERCHOLESTEROLEMIA: ICD-10-CM

## 2022-05-26 DIAGNOSIS — N18.32 STAGE 3B CHRONIC KIDNEY DISEASE: Primary | ICD-10-CM

## 2022-05-26 DIAGNOSIS — I15.9 SECONDARY HYPERTENSION: ICD-10-CM

## 2022-06-06 ENCOUNTER — PATIENT MESSAGE (OUTPATIENT)
Dept: CARDIOLOGY | Facility: CLINIC | Age: 62
End: 2022-06-06
Payer: MEDICAID

## 2022-06-16 ENCOUNTER — PATIENT MESSAGE (OUTPATIENT)
Dept: CARDIOLOGY | Facility: CLINIC | Age: 62
End: 2022-06-16
Payer: MEDICAID

## 2022-06-17 DIAGNOSIS — I10 ESSENTIAL HYPERTENSION: ICD-10-CM

## 2022-06-20 ENCOUNTER — PATIENT MESSAGE (OUTPATIENT)
Dept: CARDIOLOGY | Facility: CLINIC | Age: 62
End: 2022-06-20
Payer: MEDICAID

## 2022-06-20 RX ORDER — CARVEDILOL 25 MG/1
50 TABLET ORAL 2 TIMES DAILY WITH MEALS
Qty: 180 TABLET | Refills: 3 | Status: ON HOLD | OUTPATIENT
Start: 2022-06-20 | End: 2022-11-14 | Stop reason: HOSPADM

## 2022-06-20 RX ORDER — ROSUVASTATIN CALCIUM 40 MG/1
40 TABLET, COATED ORAL NIGHTLY
Qty: 90 TABLET | Refills: 3 | Status: CANCELLED | OUTPATIENT
Start: 2022-06-20

## 2022-06-20 RX ORDER — EPLERENONE 50 MG/1
50 TABLET, FILM COATED ORAL DAILY
Qty: 90 TABLET | Refills: 3 | Status: SHIPPED | OUTPATIENT
Start: 2022-06-20 | End: 2023-03-15 | Stop reason: SDUPTHER

## 2022-07-08 ENCOUNTER — LAB VISIT (OUTPATIENT)
Dept: LAB | Facility: HOSPITAL | Age: 62
End: 2022-07-08
Attending: INTERNAL MEDICINE
Payer: MEDICAID

## 2022-07-08 DIAGNOSIS — E87.6 HYPOKALEMIA: ICD-10-CM

## 2022-07-08 LAB
ANION GAP SERPL CALC-SCNC: 9 MMOL/L (ref 8–16)
BUN SERPL-MCNC: 35 MG/DL (ref 8–23)
CALCIUM SERPL-MCNC: 9.2 MG/DL (ref 8.7–10.5)
CHLORIDE SERPL-SCNC: 109 MMOL/L (ref 95–110)
CO2 SERPL-SCNC: 22 MMOL/L (ref 23–29)
CREAT SERPL-MCNC: 2.2 MG/DL (ref 0.5–1.4)
EST. GFR  (AFRICAN AMERICAN): 36 ML/MIN/1.73 M^2
EST. GFR  (NON AFRICAN AMERICAN): 31.2 ML/MIN/1.73 M^2
GLUCOSE SERPL-MCNC: 166 MG/DL (ref 70–110)
POTASSIUM SERPL-SCNC: 3.1 MMOL/L (ref 3.5–5.1)
SODIUM SERPL-SCNC: 140 MMOL/L (ref 136–145)

## 2022-07-08 PROCEDURE — 36415 COLL VENOUS BLD VENIPUNCTURE: CPT | Mod: PO | Performed by: INTERNAL MEDICINE

## 2022-07-08 PROCEDURE — 80048 BASIC METABOLIC PNL TOTAL CA: CPT | Performed by: INTERNAL MEDICINE

## 2022-07-14 ENCOUNTER — LAB VISIT (OUTPATIENT)
Dept: LAB | Facility: HOSPITAL | Age: 62
End: 2022-07-14
Attending: INTERNAL MEDICINE
Payer: MEDICAID

## 2022-07-14 DIAGNOSIS — Z01.818 PREOP TESTING: ICD-10-CM

## 2022-07-14 LAB
ANION GAP SERPL CALC-SCNC: 11 MMOL/L (ref 8–16)
BASOPHILS # BLD AUTO: 0.04 K/UL (ref 0–0.2)
BASOPHILS NFR BLD: 0.7 % (ref 0–1.9)
BUN SERPL-MCNC: 37 MG/DL (ref 8–23)
CALCIUM SERPL-MCNC: 9.2 MG/DL (ref 8.7–10.5)
CHLORIDE SERPL-SCNC: 107 MMOL/L (ref 95–110)
CO2 SERPL-SCNC: 22 MMOL/L (ref 23–29)
CREAT SERPL-MCNC: 2.6 MG/DL (ref 0.5–1.4)
DIFFERENTIAL METHOD: ABNORMAL
EOSINOPHIL # BLD AUTO: 0.2 K/UL (ref 0–0.5)
EOSINOPHIL NFR BLD: 3.4 % (ref 0–8)
ERYTHROCYTE [DISTWIDTH] IN BLOOD BY AUTOMATED COUNT: 12.6 % (ref 11.5–14.5)
EST. GFR  (AFRICAN AMERICAN): 29 ML/MIN/1.73 M^2
EST. GFR  (NON AFRICAN AMERICAN): 25 ML/MIN/1.73 M^2
GLUCOSE SERPL-MCNC: 118 MG/DL (ref 70–110)
HCT VFR BLD AUTO: 22.7 % (ref 40–54)
HGB BLD-MCNC: 7.8 G/DL (ref 14–18)
IMM GRANULOCYTES # BLD AUTO: 0.01 K/UL (ref 0–0.04)
IMM GRANULOCYTES NFR BLD AUTO: 0.2 % (ref 0–0.5)
LYMPHOCYTES # BLD AUTO: 1.2 K/UL (ref 1–4.8)
LYMPHOCYTES NFR BLD: 20.3 % (ref 18–48)
MCH RBC QN AUTO: 30.1 PG (ref 27–31)
MCHC RBC AUTO-ENTMCNC: 34.4 G/DL (ref 32–36)
MCV RBC AUTO: 88 FL (ref 82–98)
MONOCYTES # BLD AUTO: 0.7 K/UL (ref 0.3–1)
MONOCYTES NFR BLD: 11.1 % (ref 4–15)
NEUTROPHILS # BLD AUTO: 3.9 K/UL (ref 1.8–7.7)
NEUTROPHILS NFR BLD: 64.3 % (ref 38–73)
NRBC BLD-RTO: 0 /100 WBC
PLATELET # BLD AUTO: 145 K/UL (ref 150–450)
PMV BLD AUTO: 10.6 FL (ref 9.2–12.9)
POTASSIUM SERPL-SCNC: 3.3 MMOL/L (ref 3.5–5.1)
RBC # BLD AUTO: 2.59 M/UL (ref 4.6–6.2)
SODIUM SERPL-SCNC: 140 MMOL/L (ref 136–145)
WBC # BLD AUTO: 6.11 K/UL (ref 3.9–12.7)

## 2022-07-14 PROCEDURE — 36415 COLL VENOUS BLD VENIPUNCTURE: CPT | Performed by: INTERNAL MEDICINE

## 2022-07-14 PROCEDURE — 85025 COMPLETE CBC W/AUTO DIFF WBC: CPT | Performed by: INTERNAL MEDICINE

## 2022-07-14 PROCEDURE — 80048 BASIC METABOLIC PNL TOTAL CA: CPT | Performed by: INTERNAL MEDICINE

## 2022-07-17 ENCOUNTER — PATIENT MESSAGE (OUTPATIENT)
Dept: CARDIOLOGY | Facility: HOSPITAL | Age: 62
End: 2022-07-17
Payer: MEDICAID

## 2022-07-18 RX ORDER — CLOPIDOGREL BISULFATE 75 MG/1
75 TABLET ORAL DAILY
Qty: 30 TABLET | Refills: 11 | Status: SHIPPED | OUTPATIENT
Start: 2022-07-18 | End: 2023-01-09 | Stop reason: ALTCHOICE

## 2022-07-25 ENCOUNTER — LAB VISIT (OUTPATIENT)
Dept: LAB | Facility: HOSPITAL | Age: 62
End: 2022-07-25
Attending: INTERNAL MEDICINE
Payer: MEDICAID

## 2022-07-25 DIAGNOSIS — D50.9 IRON DEFICIENCY ANEMIA, UNSPECIFIED IRON DEFICIENCY ANEMIA TYPE: ICD-10-CM

## 2022-07-25 LAB
ALBUMIN SERPL BCP-MCNC: 3.3 G/DL (ref 3.5–5.2)
ALP SERPL-CCNC: 45 U/L (ref 55–135)
ALT SERPL W/O P-5'-P-CCNC: 16 U/L (ref 10–44)
ANION GAP SERPL CALC-SCNC: 10 MMOL/L (ref 8–16)
AST SERPL-CCNC: 20 U/L (ref 10–40)
BASOPHILS # BLD AUTO: 0.04 K/UL (ref 0–0.2)
BASOPHILS NFR BLD: 0.7 % (ref 0–1.9)
BILIRUB SERPL-MCNC: 0.3 MG/DL (ref 0.1–1)
BUN SERPL-MCNC: 29 MG/DL (ref 8–23)
CALCIUM SERPL-MCNC: 8.7 MG/DL (ref 8.7–10.5)
CHLORIDE SERPL-SCNC: 109 MMOL/L (ref 95–110)
CO2 SERPL-SCNC: 22 MMOL/L (ref 23–29)
CREAT SERPL-MCNC: 2.7 MG/DL (ref 0.5–1.4)
DIFFERENTIAL METHOD: ABNORMAL
EOSINOPHIL # BLD AUTO: 0.2 K/UL (ref 0–0.5)
EOSINOPHIL NFR BLD: 3.6 % (ref 0–8)
ERYTHROCYTE [DISTWIDTH] IN BLOOD BY AUTOMATED COUNT: 12.7 % (ref 11.5–14.5)
ERYTHROCYTE [SEDIMENTATION RATE] IN BLOOD BY WESTERGREN METHOD: 74 MM/HR (ref 0–10)
EST. GFR  (AFRICAN AMERICAN): 28 ML/MIN/1.73 M^2
EST. GFR  (NON AFRICAN AMERICAN): 24 ML/MIN/1.73 M^2
FERRITIN SERPL-MCNC: 253 NG/ML (ref 20–300)
GLUCOSE SERPL-MCNC: 114 MG/DL (ref 70–110)
HCT VFR BLD AUTO: 23.3 % (ref 40–54)
HGB BLD-MCNC: 8.2 G/DL (ref 14–18)
IMM GRANULOCYTES # BLD AUTO: 0.01 K/UL (ref 0–0.04)
IMM GRANULOCYTES NFR BLD AUTO: 0.2 % (ref 0–0.5)
IRON SERPL-MCNC: 58 UG/DL (ref 45–160)
LYMPHOCYTES # BLD AUTO: 1.2 K/UL (ref 1–4.8)
LYMPHOCYTES NFR BLD: 20.7 % (ref 18–48)
MCH RBC QN AUTO: 30.1 PG (ref 27–31)
MCHC RBC AUTO-ENTMCNC: 35.2 G/DL (ref 32–36)
MCV RBC AUTO: 86 FL (ref 82–98)
MONOCYTES # BLD AUTO: 0.6 K/UL (ref 0.3–1)
MONOCYTES NFR BLD: 9.7 % (ref 4–15)
NEUTROPHILS # BLD AUTO: 3.8 K/UL (ref 1.8–7.7)
NEUTROPHILS NFR BLD: 65.1 % (ref 38–73)
NRBC BLD-RTO: 0 /100 WBC
PLATELET # BLD AUTO: 152 K/UL (ref 150–450)
PMV BLD AUTO: 10.3 FL (ref 9.2–12.9)
POTASSIUM SERPL-SCNC: 3.2 MMOL/L (ref 3.5–5.1)
PROT SERPL-MCNC: 6.3 G/DL (ref 6–8.4)
RBC # BLD AUTO: 2.72 M/UL (ref 4.6–6.2)
SATURATED IRON: 18 % (ref 20–50)
SODIUM SERPL-SCNC: 141 MMOL/L (ref 136–145)
TOTAL IRON BINDING CAPACITY: 323 UG/DL (ref 250–450)
TRANSFERRIN SERPL-MCNC: 218 MG/DL (ref 200–375)
WBC # BLD AUTO: 5.8 K/UL (ref 3.9–12.7)

## 2022-07-25 PROCEDURE — 85025 COMPLETE CBC W/AUTO DIFF WBC: CPT | Performed by: INTERNAL MEDICINE

## 2022-07-25 PROCEDURE — 84466 ASSAY OF TRANSFERRIN: CPT | Performed by: INTERNAL MEDICINE

## 2022-07-25 PROCEDURE — 82728 ASSAY OF FERRITIN: CPT | Performed by: INTERNAL MEDICINE

## 2022-07-25 PROCEDURE — 85652 RBC SED RATE AUTOMATED: CPT | Performed by: INTERNAL MEDICINE

## 2022-07-25 PROCEDURE — 36415 COLL VENOUS BLD VENIPUNCTURE: CPT | Performed by: INTERNAL MEDICINE

## 2022-07-25 PROCEDURE — 80053 COMPREHEN METABOLIC PANEL: CPT | Performed by: INTERNAL MEDICINE

## 2022-08-02 NOTE — HPI
Domingo Mac Gross Sr. 57 y.o. male is here follow up PAD with winsome IIb claudication. In 6/2017 he had L EIA PTAS for winsome IIb claudication. Despite the intervention and pletal with an exercise he continues to have claudication, L >>R. Winsome IIb. No ulcers. He is on aspirin and plavix for DAPT.         He has a h/o L SFA atherectomy with 2.2 Phoenix catheter with PTA using using 6.0 x 100 mm Lutonix DCB in 6/2015.  He had distal aorta iliac reconstruction in 6/2014 with 8.0 x 39 mm BES overlapping with 9.0 x 60 in R EIA and 8.0 x 80 mm SES in L EIA post dilated with 9.0 mm bilaterally.         ELISABETH with stress 5/2017:              R 1.01 to 0.44              L 0.92 to 0.45               After 6 minutes ambulation        CTA 5/2017:                    Patent distal aorta and bilateral GRUPO/EIA stents              L EIA with de shawna 90% stenosis              L SFA 80% with 3 vessel run off                 R EIA/CFA with moderate disease              R SFA 80% stenosis with 3 vessel run off           Peripheral angiogram with intervention 6/2017         Patent bilateral GRUPO/EIA stents.    De shawna 80% left EIA stenosis treated with 9.0 x 80 mm Lifestar  stent post dilated with 8.0 mm balloon.    Bilateral 70-80% SFA stenosis with 3 vessel run off.   independent

## 2022-08-05 ENCOUNTER — PATIENT MESSAGE (OUTPATIENT)
Dept: INTERNAL MEDICINE | Facility: CLINIC | Age: 62
End: 2022-08-05
Payer: MEDICAID

## 2022-08-11 ENCOUNTER — TELEPHONE (OUTPATIENT)
Dept: CARDIOLOGY | Facility: CLINIC | Age: 62
End: 2022-08-11
Payer: MEDICAID

## 2022-08-11 ENCOUNTER — LAB VISIT (OUTPATIENT)
Dept: LAB | Facility: HOSPITAL | Age: 62
End: 2022-08-11
Attending: INTERNAL MEDICINE
Payer: MEDICAID

## 2022-08-11 DIAGNOSIS — Z01.810 PRE-OPERATIVE CARDIOVASCULAR EXAMINATION: ICD-10-CM

## 2022-08-11 LAB
ANION GAP SERPL CALC-SCNC: 11 MMOL/L (ref 8–16)
BASOPHILS # BLD AUTO: 0.06 K/UL (ref 0–0.2)
BASOPHILS NFR BLD: 0.9 % (ref 0–1.9)
BUN SERPL-MCNC: 30 MG/DL (ref 8–23)
CALCIUM SERPL-MCNC: 9.7 MG/DL (ref 8.7–10.5)
CHLORIDE SERPL-SCNC: 108 MMOL/L (ref 95–110)
CO2 SERPL-SCNC: 23 MMOL/L (ref 23–29)
CREAT SERPL-MCNC: 2.9 MG/DL (ref 0.5–1.4)
DIFFERENTIAL METHOD: ABNORMAL
EOSINOPHIL # BLD AUTO: 0.2 K/UL (ref 0–0.5)
EOSINOPHIL NFR BLD: 3.4 % (ref 0–8)
ERYTHROCYTE [DISTWIDTH] IN BLOOD BY AUTOMATED COUNT: 12.3 % (ref 11.5–14.5)
EST. GFR  (NO RACE VARIABLE): 24 ML/MIN/1.73 M^2
GLUCOSE SERPL-MCNC: 115 MG/DL (ref 70–110)
HCT VFR BLD AUTO: 24.5 % (ref 40–54)
HGB BLD-MCNC: 8.4 G/DL (ref 14–18)
IMM GRANULOCYTES # BLD AUTO: 0.02 K/UL (ref 0–0.04)
IMM GRANULOCYTES NFR BLD AUTO: 0.3 % (ref 0–0.5)
LYMPHOCYTES # BLD AUTO: 1.3 K/UL (ref 1–4.8)
LYMPHOCYTES NFR BLD: 20.6 % (ref 18–48)
MCH RBC QN AUTO: 29.6 PG (ref 27–31)
MCHC RBC AUTO-ENTMCNC: 34.3 G/DL (ref 32–36)
MCV RBC AUTO: 86 FL (ref 82–98)
MONOCYTES # BLD AUTO: 0.6 K/UL (ref 0.3–1)
MONOCYTES NFR BLD: 9.7 % (ref 4–15)
NEUTROPHILS # BLD AUTO: 4.2 K/UL (ref 1.8–7.7)
NEUTROPHILS NFR BLD: 65.1 % (ref 38–73)
NRBC BLD-RTO: 0 /100 WBC
PLATELET # BLD AUTO: 148 K/UL (ref 150–450)
PMV BLD AUTO: 10.5 FL (ref 9.2–12.9)
POTASSIUM SERPL-SCNC: 2.7 MMOL/L (ref 3.5–5.1)
RBC # BLD AUTO: 2.84 M/UL (ref 4.6–6.2)
SODIUM SERPL-SCNC: 142 MMOL/L (ref 136–145)
WBC # BLD AUTO: 6.42 K/UL (ref 3.9–12.7)

## 2022-08-11 PROCEDURE — 36415 COLL VENOUS BLD VENIPUNCTURE: CPT | Performed by: INTERNAL MEDICINE

## 2022-08-11 PROCEDURE — 80048 BASIC METABOLIC PNL TOTAL CA: CPT | Performed by: INTERNAL MEDICINE

## 2022-08-11 PROCEDURE — 85025 COMPLETE CBC W/AUTO DIFF WBC: CPT | Performed by: INTERNAL MEDICINE

## 2022-08-11 NOTE — TELEPHONE ENCOUNTER
Mena canales to Dr Jenkins regarding Potassium level of 2.7.    He spoke with the Cath Lab team and they will have him come in early tomorrow for potassium prior to case

## 2022-08-11 NOTE — TELEPHONE ENCOUNTER
Received call from Altaf with [facility] who reports a critical lab result:    Potassium  2.7

## 2022-08-12 ENCOUNTER — HOSPITAL ENCOUNTER (OUTPATIENT)
Facility: HOSPITAL | Age: 62
Discharge: HOME | End: 2022-08-12
Attending: INTERNAL MEDICINE | Admitting: INTERNAL MEDICINE
Payer: MEDICAID

## 2022-08-12 VITALS
HEIGHT: 70 IN | WEIGHT: 180 LBS | SYSTOLIC BLOOD PRESSURE: 117 MMHG | TEMPERATURE: 97 F | OXYGEN SATURATION: 99 % | RESPIRATION RATE: 13 BRPM | HEART RATE: 54 BPM | BODY MASS INDEX: 25.77 KG/M2 | DIASTOLIC BLOOD PRESSURE: 60 MMHG

## 2022-08-12 DIAGNOSIS — Z01.810 PRE-OPERATIVE CARDIOVASCULAR EXAMINATION: Primary | ICD-10-CM

## 2022-08-12 DIAGNOSIS — I73.9 PAD (PERIPHERAL ARTERY DISEASE): ICD-10-CM

## 2022-08-12 DIAGNOSIS — Z01.818 PREOP TESTING: ICD-10-CM

## 2022-08-12 DIAGNOSIS — I70.211 ATHEROSCLEROSIS OF NATIVE ARTERY OF RIGHT LOWER EXTREMITY WITH INTERMITTENT CLAUDICATION: ICD-10-CM

## 2022-08-12 DIAGNOSIS — I73.9 CLAUDICATION IN PERIPHERAL VASCULAR DISEASE: ICD-10-CM

## 2022-08-12 LAB
POC ACTIVATED CLOTTING TIME K: 173 SEC (ref 74–137)
POC ACTIVATED CLOTTING TIME K: 214 SEC (ref 74–137)
POC ACTIVATED CLOTTING TIME K: 242 SEC (ref 74–137)
SAMPLE: ABNORMAL

## 2022-08-12 PROCEDURE — 37252 INTRVASC US NONCORONARY 1ST: CPT | Mod: ,,, | Performed by: INTERNAL MEDICINE

## 2022-08-12 PROCEDURE — 37225 PR FEM/POPL REVAS W/ATHERECTOMY: ICD-10-PCS | Mod: RT,,, | Performed by: INTERNAL MEDICINE

## 2022-08-12 PROCEDURE — 37252 INTRVASC US NONCORONARY 1ST: CPT | Performed by: INTERNAL MEDICINE

## 2022-08-12 PROCEDURE — 27201423 OPTIME MED/SURG SUP & DEVICES STERILE SUPPLY: Performed by: INTERNAL MEDICINE

## 2022-08-12 PROCEDURE — 63600175 PHARM REV CODE 636 W HCPCS: Performed by: INTERNAL MEDICINE

## 2022-08-12 PROCEDURE — C1887 CATHETER, GUIDING: HCPCS | Performed by: INTERNAL MEDICINE

## 2022-08-12 PROCEDURE — 99152 MOD SED SAME PHYS/QHP 5/>YRS: CPT | Performed by: INTERNAL MEDICINE

## 2022-08-12 PROCEDURE — C1753 CATH, INTRAVAS ULTRASOUND: HCPCS | Performed by: INTERNAL MEDICINE

## 2022-08-12 PROCEDURE — 93010 ELECTROCARDIOGRAM REPORT: CPT | Mod: ,,, | Performed by: INTERNAL MEDICINE

## 2022-08-12 PROCEDURE — C1894 INTRO/SHEATH, NON-LASER: HCPCS | Performed by: INTERNAL MEDICINE

## 2022-08-12 PROCEDURE — C1769 GUIDE WIRE: HCPCS | Performed by: INTERNAL MEDICINE

## 2022-08-12 PROCEDURE — 37252 PR IVUS, NON-CORONARY, 1ST VESSEL: ICD-10-PCS | Mod: ,,, | Performed by: INTERNAL MEDICINE

## 2022-08-12 PROCEDURE — 99152 MOD SED SAME PHYS/QHP 5/>YRS: CPT | Mod: ,,, | Performed by: INTERNAL MEDICINE

## 2022-08-12 PROCEDURE — 37225 PR FEM/POPL REVAS W/ATHERECTOMY: CPT | Mod: RT,,, | Performed by: INTERNAL MEDICINE

## 2022-08-12 PROCEDURE — 93005 ELECTROCARDIOGRAM TRACING: CPT

## 2022-08-12 PROCEDURE — 93010 EKG 12-LEAD: ICD-10-PCS | Mod: ,,, | Performed by: INTERNAL MEDICINE

## 2022-08-12 PROCEDURE — 25000003 PHARM REV CODE 250: Performed by: INTERNAL MEDICINE

## 2022-08-12 PROCEDURE — C2623 CATH, TRANSLUMIN, DRUG-COAT: HCPCS | Performed by: INTERNAL MEDICINE

## 2022-08-12 PROCEDURE — C1725 CATH, TRANSLUMIN NON-LASER: HCPCS | Performed by: INTERNAL MEDICINE

## 2022-08-12 PROCEDURE — 85347 COAGULATION TIME ACTIVATED: CPT | Performed by: INTERNAL MEDICINE

## 2022-08-12 PROCEDURE — 99153 MOD SED SAME PHYS/QHP EA: CPT | Performed by: INTERNAL MEDICINE

## 2022-08-12 PROCEDURE — 37225 HC FEM/POPL REVAS W/ATHER: CPT | Mod: RT | Performed by: INTERNAL MEDICINE

## 2022-08-12 PROCEDURE — 99152 PR MOD CONSCIOUS SEDATION, SAME PHYS, 5+ YRS, FIRST 15 MIN: ICD-10-PCS | Mod: ,,, | Performed by: INTERNAL MEDICINE

## 2022-08-12 RX ORDER — POTASSIUM CHLORIDE 20 MEQ/1
60 TABLET, EXTENDED RELEASE ORAL ONCE
Status: COMPLETED | OUTPATIENT
Start: 2022-08-12 | End: 2022-08-12

## 2022-08-12 RX ORDER — VERAPAMIL HYDROCHLORIDE 2.5 MG/ML
INJECTION, SOLUTION INTRAVENOUS
Status: DISCONTINUED | OUTPATIENT
Start: 2022-08-12 | End: 2022-08-12 | Stop reason: HOSPADM

## 2022-08-12 RX ORDER — FENTANYL CITRATE 50 UG/ML
INJECTION, SOLUTION INTRAMUSCULAR; INTRAVENOUS
Status: DISCONTINUED | OUTPATIENT
Start: 2022-08-12 | End: 2022-08-12 | Stop reason: HOSPADM

## 2022-08-12 RX ORDER — HEPARIN SODIUM 1000 [USP'U]/ML
INJECTION, SOLUTION INTRAVENOUS; SUBCUTANEOUS
Status: DISCONTINUED | OUTPATIENT
Start: 2022-08-12 | End: 2022-08-12 | Stop reason: HOSPADM

## 2022-08-12 RX ORDER — MIDAZOLAM HYDROCHLORIDE 1 MG/ML
INJECTION, SOLUTION INTRAMUSCULAR; INTRAVENOUS
Status: DISCONTINUED | OUTPATIENT
Start: 2022-08-12 | End: 2022-08-12 | Stop reason: HOSPADM

## 2022-08-12 RX ORDER — DIPHENHYDRAMINE HYDROCHLORIDE 50 MG/ML
INJECTION INTRAMUSCULAR; INTRAVENOUS
Status: DISCONTINUED | OUTPATIENT
Start: 2022-08-12 | End: 2022-08-12 | Stop reason: HOSPADM

## 2022-08-12 RX ORDER — SODIUM CHLORIDE 9 MG/ML
INJECTION, SOLUTION INTRAVENOUS CONTINUOUS
Status: ACTIVE | OUTPATIENT
Start: 2022-08-12 | End: 2022-08-12

## 2022-08-12 RX ORDER — HEPARIN SODIUM 200 [USP'U]/100ML
INJECTION, SOLUTION INTRAVENOUS
Status: DISCONTINUED | OUTPATIENT
Start: 2022-08-12 | End: 2022-08-12 | Stop reason: HOSPADM

## 2022-08-12 RX ORDER — LIDOCAINE HYDROCHLORIDE 10 MG/ML
INJECTION, SOLUTION EPIDURAL; INFILTRATION; INTRACAUDAL; PERINEURAL
Status: DISCONTINUED | OUTPATIENT
Start: 2022-08-12 | End: 2022-08-12 | Stop reason: HOSPADM

## 2022-08-12 RX ADMIN — POTASSIUM CHLORIDE 60 MEQ: 1500 TABLET, EXTENDED RELEASE ORAL at 09:08

## 2022-08-12 RX ADMIN — SODIUM CHLORIDE 100 ML/HR: 0.9 INJECTION, SOLUTION INTRAVENOUS at 09:08

## 2022-08-12 NOTE — Clinical Note
20 ml of Viperslide Lubricant, 25 ml of Nitroglycerin and 5 mg of Verapamil added to 1 L NS for Diamondback.

## 2022-08-12 NOTE — H&P
Interventional Cardiology H&P Pre Procedure Note    Per Dr. Ramirez Note on 4/2022:  Domingo Gross is a 62 y.o. male a  who presents for follow up of Peripheral Arterial Disease, Coronary Artery Disease, Claudication, Hyperlipidemia, and Hypertension     Here for follow up of CAD, HTN, HLP, PAF in NSR, edema, and PAD with winsome IIb claudication. He is s/p bilateral SFA, GRUPO, EIA, and R CFA revascularization. He has seen nephrology and later recommended a renal biopsy but he was reasonably reluctant to stop DAPT because of his history of cardiac arrest. His peripheral vascular intervention in 12/2021 was cancelled due to severe anemia with a H/H that required a transfusion with subsequent ongoing intervention by heme/onc. He has a GI work up for anemia was normal. His H/H has improved with heme/onc care.          He was admitted 10/4/2019 for cardiac arrest. He was fully rescued. Initial rhythm was afib with rvr. He was not taken immediately to the cath lab because of ARF + hypokalemia. He had a diagnostic angiogram which revealed patent LM, LAD, RCA stent, and new ostial LCX lesion 99% with R to L collaterals. He had PCI with REZA after his renal function returned to baseline.            8/19/2021 with CP HTN urgency with . Added nifedipine and BP has been stable. No CP           He was admitted on 2/1/2022. His peripheral revascularization procedure was cancelled because of hyperkalemia and denial by insurance company requiring a peer to peer. Both issues have been resolved. He is ready to proceed with revascularization.            ELISABETH with stress 5/2017:              R 1.01 to 0.44              L 0.92 to 0.45               After 6 minutes ambulation        CTA 5/2017:                    Patent distal aorta and bilateral GRUPO/EIA stents              L EIA with de shawna 90% stenosis              L SFA 80% with 3 vessel run off                 R EIA/CFA with moderate disease              R SFA 80% stenosis  with 3 vessel run off           Peripheral angiogram with intervention 6/2017         Patent bilateral GRUPO/EIA stents.    De shawna 80% left EIA stenosis treated with 9.0 x 80 mm Lifestar  stent post dilated with 8.0 mm balloon.    Bilateral 70-80% SFA stenosis with 3 vessel run off.           3/2018        L SFA intervention for claudication              75% L SFA with 3 vessel run off              7 mmHg resting and 33 mmHg hyperemic mean gradient                                L CFA antegrade access              PTA with 6.0 x 150 mm              PTA with 6.0 x 150 mm Lutonix              3 vessel run off               4/13/2018                    S/p R SFA PTA for winsome IIb claudication              R CFA antegrade access                    R CFA with 50% stenosis-heavily calcified lesion                          Baseline /90                    R SFA with 70% stenosis with a significant resting and hyperemic mean gradient               3 vessel run off                        PTA of R SFA with 6.0 x 150 + 6.0 x 60 mm Lutonix DCB           5/2/2018              Patent arteries post revascularization           2/2019                 R 0.84 to 0.27              L 0.90 to 0.66              After ambulation              Severe R calf claudication              Nuclear stress test 2/2019                 + ECG with 2 mm ST depression              No wall motion abnormalities              Test stopped at 6 minutes, 7 METs, 86% predicted heart rate              Stopped because of R leg claudication + ECG changes              S/p PCI RCA and LAD with REZA 3/2019                    RCA 3.5 x 22 mm Resolute REZA              LAD 2.5 x 30 and 2.5 x 25 mm Resolute REZA        Peripheral aortogram 5/10/2019                 R CFA 95% stenosis              3 vessel run off           Seen by Dr. Giron for R CFA endarterectomy but preferable should be off plavix for at least 5 days. He has a risk for stent thrombosis with  premature interruption of DAPT. He later had R CFA atherectomy + PTA with DCB.               10/11/2019 for cardiac arrest         S/p staged LCX PCI                    L CFA access              IVUS guided PCI              3.0 x 15 mm Resolute REZA post dilated with 3.5 NC balloon            PET stress 2/2020                 No ischemia        Echo 2/2021                            EF 55%              Normal diastolic fn              Normal RV fn              Mild MR/TR/VA                      Arterial US 7/2021                 Bilateral SFA disease > 70        Venous US 7/2021                 No DVT              No superficial reflux              R POP vein reflux > 500 msec     Echo 8/2021                            Normal EF              Normal RV fn              Mild-mod MR              Mod VA              PASP 26 mmHg           ELISABETH 11/2021                  Resting ELISABETH right 0.84 and left 0.83              Exercise ELISABETH right 0.39 and left 0.32 after 3.5 minutes ambulation              TBI right 0.58 and left 0.48              US 11/2021                            Bilateral mid SFA high grade disease    Home Medications:  No current facility-administered medications on file prior to encounter.     Current Outpatient Medications on File Prior to Encounter   Medication Sig Dispense Refill    ASPIRIN (ASPIR-81 ORAL) Take 81 mg by mouth once daily.      coenzyme Q10 100 mg capsule Take 100 mg by mouth once daily.      doxazosin (CARDURA) 4 MG tablet TAKE 1 TABLET(4 MG) BY MOUTH EVERY EVENING (Patient taking differently: Take 4 mg by mouth every evening.) 90 tablet 3    gabapentin (NEURONTIN) 300 MG capsule Take 1 capsule (300 mg total) by mouth 3 (three) times daily as needed (back pain). 90 capsule 11    NIFEdipine (PROCARDIA-XL) 60 MG (OSM) 24 hr tablet Take 1 tablet (60 mg total) by mouth once daily. 90 tablet 1    rosuvastatin (CRESTOR) 40 MG Tab TAKE 1 TABLET(40 MG) BY MOUTH EVERY EVENING 90 tablet 3     rosuvastatin (CRESTOR) 40 MG Tab Take 1 tablet (40 mg total) by mouth every evening. 90 tablet 3    mupirocin (BACTROBAN) 2 % ointment Apply topically 3 (three) times daily. 1 each 3       Allergies:  Review of patient's allergies indicates:   Allergen Reactions    Ace inhibitors Rash       Past Medical History:  Past Medical History:   Diagnosis Date    Allergy     Anemia     Anticoagulant long-term use     Arthritis     CKD (chronic kidney disease) stage 4, GFR 15-29 ml/min     COPD (chronic obstructive pulmonary disease) 8/20/2021    Coronary artery disease     Heart attack 10/04/2019    Hematuria     Hemothorax     Hyperlipidemia     Hypertension     Hyperuricemia     Hypocalcemia     Hypokalemia     Hypophosphatemia     PAD (peripheral artery disease)     Proteinuria     Vitamin D deficiency        Past Surgical History:  Past Surgical History:   Procedure Laterality Date    ABDOMINAL AORTOGRAPHY N/A 5/10/2019    Procedure: AORTOGRAM-ABDOMINAL;  Surgeon: Keo Jenkins MD;  Location: [facility] CATH LAB/EP;  Service: Cardiology;  Laterality: N/A;  RSFA intervention     AORTOGRAPHY WITH SERIALOGRAPHY N/A 12/3/2021    Procedure: AORTOGRAM, WITH SERIALOGRAPHY;  Surgeon: Keo Jenkins MD;  Location: [facility] CATH LAB/EP;  Service: Cardiology;  Laterality: N/A;    AORTOGRAPHY WITH SERIALOGRAPHY N/A 4/1/2022    Procedure: AORTOGRAM, WITH SERIALOGRAPHY;  Surgeon: Keo Jenkins MD;  Location: [facility] CATH LAB/EP;  Service: Cardiology;  Laterality: N/A;    BONE MARROW BIOPSY Right 10/12/2021    Procedure: BIOPSY-BONE MARROW;  Surgeon: Naseem Woods MD;  Location: [facility] OR;  Service: Oncology;  Laterality: Right;    CARDIAC CATHETERIZATION      CATARACT EXTRACTION      CATARACT EXTRACTION W/  INTRAOCULAR LENS IMPLANT Right 6/8/2020    Procedure: EXTRACTION, CATARACT, WITH IOL INSERTION;  Surgeon: Tin Light MD;  Location: [facility] OR;  Service: Ophthalmology;  Laterality: Right;    CATARACT  EXTRACTION W/  INTRAOCULAR LENS IMPLANT Left 7/2/2020    Procedure: EXTRACTION, CATARACT, WITH IOL INSERTION;  Surgeon: Tin Light MD;  Location: [facility];  Service: Ophthalmology;  Laterality: Left;    COLONOSCOPY N/A 1/6/2022    Procedure: COLONOSCOPY;  Surgeon: Kathe Penaloza MD;  Location: [facility] ENDO (2ND FLR);  Service: Endoscopy;  Laterality: N/A;  COVID test at [facility] on 1/3-GT  okay to hold Plavix for 5 days and aspirin per Dr. Becerra  2nd floor due toextensive cardiac history   instructions mailed and my [facility] portal -     CORONARY ANGIOGRAPHY N/A 3/29/2019    Procedure: ANGIOGRAM, CORONARY ARTERY;  Surgeon: Keo Jenkins MD;  Location: [facility] CATH LAB/EP;  Service: Cardiology;  Laterality: N/A;    CORONARY ANGIOGRAPHY Left 10/11/2019    Procedure: ANGIOGRAM, CORONARY ARTERY;  Surgeon: Keo Jenkins MD;  Location: [facility] CATH LAB/EP;  Service: Cardiology;  Laterality: Left;    CORONARY ANGIOPLASTY WITH STENT PLACEMENT  03/29/2019    mid and distal RCA    ESOPHAGOGASTRODUODENOSCOPY N/A 1/6/2022    Procedure: EGD (ESOPHAGOGASTRODUODENOSCOPY);  Surgeon: Kathe Penaloza MD;  Location: [facility] ENDO (2ND FLR);  Service: Endoscopy;  Laterality: N/A;    EYE SURGERY      LEFT HEART CATHETERIZATION N/A 3/29/2019    Procedure: Left heart cath;  Surgeon: Keo Jenkins MD;  Location: [facility] CATH LAB/EP;  Service: Cardiology;  Laterality: N/A;    LEFT HEART CATHETERIZATION Left 10/8/2019    Procedure: Left heart cath;  Surgeon: Keo Jenkins MD;  Location: [facility] CATH LAB/EP;  Service: Cardiology;  Laterality: Left;    PERCUTANEOUS TRANSLUMINAL ANGIOPLASTY (PTA) OF PERIPHERAL VESSEL N/A 7/12/2019    Procedure: PTA, PERIPHERAL VESSEL;  Surgeon: Keo Jenkins MD;  Location: [facility] CATH LAB/EP;  Service: Cardiology;  Laterality: N/A;    PERCUTANEOUS TRANSLUMINAL ANGIOPLASTY (PTA) OF PERIPHERAL VESSEL Left 5/20/2022    Procedure: PTA, PERIPHERAL VESSEL;  Surgeon: Keo Jenkins MD;  Location: [facility] CATH  LAB/EP;  Service: Cardiology;  Laterality: Left;       Social History:  Social History     Socioeconomic History    Marital status:    Occupational History     Employer: [employer]   Tobacco Use    Smoking status: Former Smoker     Types: Cigarettes     Quit date: 1999     Years since quittin.3    Smokeless tobacco: Never Used   Substance and Sexual Activity    Alcohol use: No     Alcohol/week: 0.0 standard drinks    Drug use: No    Sexual activity: Yes     Partners: Female     Social Determinants of Health     Financial Resource Strain: Low Risk     Difficulty of Paying Living Expenses: Not hard at all   Food Insecurity: No Food Insecurity    Worried About Running Out of Food in the Last Year: Never true    Ran Out of Food in the Last Year: Never true   Transportation Needs: No Transportation Needs    Lack of Transportation (Medical): No    Lack of Transportation (Non-Medical): No   Physical Activity: Inactive    Days of Exercise per Week: 0 days    Minutes of Exercise per Session: 0 min   Stress: No Stress Concern Present    Feeling of Stress : Not at all   Social Connections: Unknown    Frequency of Communication with Friends and Family: Three times a week    Frequency of Social Gatherings with Friends and Family: Never    Active Member of Clubs or Organizations: No    Attends Club or Organization Meetings: Never    Marital Status:    Housing Stability: Low Risk     Unable to Pay for Housing in the Last Year: No    Number of Places Lived in the Last Year: 0    Unstable Housing in the Last Year: No       Family History:  Family History   Problem Relation Age of Onset    Aneurysm Mother     Hypertension Mother     Heart disease Mother     Cancer Father     Diabetes Sister     Hypertension Sister     No Known Problems Brother     No Known Problems Son        Physical Exam:  Vital Signs- /63   Pulse 60   Temp 98.2 °F (36.8 °C) (Temporal)   Resp 11    "Ht 5' 10" (1.778 m)   Wt 81.6 kg (180 lb)   SpO2 98%   BMI 25.83 kg/m²   Gen- appears stated age, in no acute distress  HEENT- NCAT, mucus membranes moist, no oropharyngeal exudate or erythema  Skin- warm and dry, no rashes or lesions  Neck- full ROM  Resp- RR normal, no accessory muscle use  CV- regular rate  Abd- soft, non-tender  MSK- full ROM in bilateral UE and LE  Neuro- AAOx3  Psych- mood and affect appropriate for setting      Labs and Imagin/11 Hgb 8.4, Cr 2.9, K 2.7    Assessment and Plan:  Domingo Gross is a 62 y.o. male s/p left CFA + SFA IVUS guided atherectomy + PTA with DCB for Stevie IIb claudication on 2022. Plan for staged R LE intervention today with low contrast/CO2 angiography.         - Plan for retrograde R PT and R radial access  - Anti-platelet Therapy: ASA + Plavix  - Creatinine/CrCl: 2.9  - Allergies: No shellfish/Iodine allergy  - The risks, benefits & alternatives of the procedure were explained to the patient.  The risks of coronary angiography include but are not limited to: Bleeding, infection, heart rhythm abnormalities, allergic reactions, kidney injury requiring dilaysis, limb loss, stroke and death. Should stenting be indicated, the patient has agreed to dual anti-platelet therapy for 1-consecutive year with a drug-eluting stent and a minimum of 1-month with the use of a bare metal stent. Additionally, patient is aware that non-compliance is likely to result in stent clotting with heart attack, heart failure, and/or death. The risks of moderate sedation include hypotension, respiratory depression, arrhythmias, bronchospasm, & death. Informed consent was obtained & the patient is agreeable to proceed with the procedure. All patient's questions were answered.     The risks (including death, heart attack, limb loss, paralysis, emergency surgery, bleeding, renal failure, and stroke), the potential benefits of the procedure, and the alternatives to the procedure, " were discussed in detail and accepted by the patient. All questions were answered. Patient agrees to proceed.    Staff: Dr. Gregory Dinh MD  Interventional Cardiology Fellow, PGY-8  8/12/2022  1:31 PM

## 2022-08-12 NOTE — Clinical Note
A percutaneous stick to the right posterior tibial artery was performed. Ultrasound guidance was used to obtain access.

## 2022-08-12 NOTE — DISCHARGE SUMMARY
Discharge Summary  Interventional Cardiology      Admit Date: 8/12/2022    Discharge Date:  8/12/2022    Attending Physician: Keo Jenkins MD    Discharge Physician: Keo Jenkins MD    Principal Diagnoses: Atherosclerosis of native artery of right lower extremity with intermittent claudication  Indication for Admission: PTA, PERIPHERAL VESSEL (Right), Intravascular Ultrasound, Non-Coronary, ATHERECTOMY, PERIPHERAL BLOOD VESSEL (N/A), PTA, Superficial Femoral Artery    Discharged Condition: Good    Hospital Course:   Patient presented for outpatient peripheral angiogram which went without complication. Peripheral angiogram revealed significant disease in R CFA and proximal/mid SFA. See full cath report in Epic for details. Hemostasis of patient's R radial A and R PT A access site was achieved with Manual pressure to R AT and vasc band to R radial artery. Patient was monitored per post-cath protocol, and his right radial and right PT access site was c/d/i with no hematoma. Patient was able to ambulate without difficulty. He was feeling well and anticipating discharge home today.     Outpatient Plan:  - There were no medication changes    Diet: Cardiac diet    Activity: Ad russ, wound care instructions provided    Disposition: Home or Self Care    Follow Up:  With cardiology in 2-4 weeks    Discharge Medications:      Medication List      CONTINUE taking these medications    ASPIR-81 ORAL     * carvediloL 25 MG tablet  Commonly known as: COREG  Take 2 tablets (50 mg total) by mouth 2 (two) times daily with meals.     * carvediloL 25 MG tablet  Commonly known as: COREG  Take 2 tablets (50 mg total) by mouth 2 (two) times daily with meals.     clopidogreL 75 mg tablet  Commonly known as: PLAVIX  Take 1 tablet (75 mg total) by mouth once daily.     coenzyme Q10 100 mg capsule     doxazosin 4 MG tablet  Commonly known as: CARDURA  TAKE 1 TABLET(4 MG) BY MOUTH EVERY EVENING     eplerenone 50 MG Tab  Commonly known as:  INSPRA  Take 1 tablet (50 mg total) by mouth once daily.     gabapentin 300 MG capsule  Commonly known as: NEURONTIN  Take 1 capsule (300 mg total) by mouth 3 (three) times daily as needed (back pain).     hydrALAZINE 100 MG tablet  Commonly known as: APRESOLINE  Take 1 tablet (100 mg total) by mouth 2 (two) times a day.     mupirocin 2 % ointment  Commonly known as: BACTROBAN  Apply topically 3 (three) times daily.     NIFEdipine 60 MG (OSM) 24 hr tablet  Commonly known as: PROCARDIA-XL  Take 1 tablet (60 mg total) by mouth once daily.     * rosuvastatin 40 MG Tab  Commonly known as: CRESTOR  TAKE 1 TABLET(40 MG) BY MOUTH EVERY EVENING     * rosuvastatin 40 MG Tab  Commonly known as: CRESTOR  Take 1 tablet (40 mg total) by mouth every evening.         * This list has 4 medication(s) that are the same as other medications prescribed for you. Read the directions carefully, and ask your doctor or other care provider to review them with you.                Chris Dinh  Interventional Cardiology Fellow PGY-8  08/12/2022

## 2022-08-12 NOTE — PLAN OF CARE
Right radial vas band removed per protocol.  Dressing placed, no bleeding noted.  Patient given discharge instructions and verbalized understanding.

## 2022-08-12 NOTE — PROCEDURES
Brief Operative Note:    : Keo Jenkins MD     Referring Physician: Keo Jenkins     All Operators: Surgeon(s):  MD Chris Ochoa MD     Preoperative Diagnosis: Atherosclerosis of native artery of right lower extremity with intermittent claudication [I70.211]     Postop Diagnosis: Atherosclerosis of native artery of right lower extremity with intermittent claudication [I70.211]    Treatments/Procedures: Procedure(s) (LRB):  PTA, PERIPHERAL VESSEL (Right)  Intravascular Ultrasound, Non-Coronary  ATHERECTOMY, PERIPHERAL BLOOD VESSEL (N/A)  PTA, Superficial Femoral Artery    Access: R radial artery, R PT artery    Intervention:Severe peripheral artery disease is present.   Significant disease in CFA and proximal SFA s/p atherectomy using CSI and DCB  Zero contrast technique    See catheterization report for full details.      Closure device:   Vasc band to right radial artery  Manual pressure to R PT A       Plan:  - Post cath protocol  - No contrast was utilized for this procedure  - Continue aspirin 81 mg daily and clopidogrel 75mg PO daily  - Follow up with outpatient cardiologist    Estimated Blood loss: 20 cc    Specimens removed: Nataly Dinh MD  Interventional Cardiology PGY-8  08/12/2022

## 2022-08-12 NOTE — PLAN OF CARE
Kimberly Pitt at bedside pulling sheath.  Pressure held until hemostasis obtained, dressing placed.  No complications noted.

## 2022-08-12 NOTE — PLAN OF CARE
Patient cleared for discharge to home and has no complaints and in no distress; pts spouse here and updated and will call for the cab to go home; pt has cloth mask and belongings

## 2022-08-12 NOTE — Clinical Note
An angiography was performed of the right  via hand injection with 0 mL of contrast co2 CAD (coronary artery disease)

## 2022-08-12 NOTE — Clinical Note
The site was marked. The groin and right foot was prepped. The site was prepped with ChloraPrep. The site was clipped. The patient was draped. The patient was positioned supine. Right leg

## 2022-08-22 ENCOUNTER — PATIENT MESSAGE (OUTPATIENT)
Dept: INTERNAL MEDICINE | Facility: CLINIC | Age: 62
End: 2022-08-22
Payer: MEDICAID

## 2022-09-01 ENCOUNTER — PATIENT OUTREACH (OUTPATIENT)
Dept: ADMINISTRATIVE | Facility: OTHER | Age: 62
End: 2022-09-01

## 2022-09-02 ENCOUNTER — LAB VISIT (OUTPATIENT)
Dept: LAB | Facility: HOSPITAL | Age: 62
End: 2022-09-02
Attending: INTERNAL MEDICINE
Payer: MEDICAID

## 2022-09-02 ENCOUNTER — OFFICE VISIT (OUTPATIENT)
Dept: INTERNAL MEDICINE | Facility: CLINIC | Age: 62
End: 2022-09-02
Payer: MEDICAID

## 2022-09-02 VITALS
HEART RATE: 68 BPM | DIASTOLIC BLOOD PRESSURE: 58 MMHG | SYSTOLIC BLOOD PRESSURE: 122 MMHG | OXYGEN SATURATION: 98 % | HEIGHT: 70 IN | WEIGHT: 178.13 LBS | BODY MASS INDEX: 25.5 KG/M2

## 2022-09-02 DIAGNOSIS — M54.9 CHRONIC BACK PAIN, UNSPECIFIED BACK LOCATION, UNSPECIFIED BACK PAIN LATERALITY: ICD-10-CM

## 2022-09-02 DIAGNOSIS — N18.4 CKD (CHRONIC KIDNEY DISEASE) STAGE 4, GFR 15-29 ML/MIN: ICD-10-CM

## 2022-09-02 DIAGNOSIS — E78.5 HYPERLIPIDEMIA, UNSPECIFIED HYPERLIPIDEMIA TYPE: ICD-10-CM

## 2022-09-02 DIAGNOSIS — G89.29 CHRONIC BACK PAIN, UNSPECIFIED BACK LOCATION, UNSPECIFIED BACK PAIN LATERALITY: ICD-10-CM

## 2022-09-02 DIAGNOSIS — E87.6 HYPOKALEMIA: ICD-10-CM

## 2022-09-02 DIAGNOSIS — Z00.00 ANNUAL PHYSICAL EXAM: ICD-10-CM

## 2022-09-02 DIAGNOSIS — I10 ESSENTIAL HYPERTENSION: ICD-10-CM

## 2022-09-02 DIAGNOSIS — D50.9 IRON DEFICIENCY ANEMIA, UNSPECIFIED IRON DEFICIENCY ANEMIA TYPE: ICD-10-CM

## 2022-09-02 LAB
ANION GAP SERPL CALC-SCNC: 9 MMOL/L (ref 8–16)
BUN SERPL-MCNC: 27 MG/DL (ref 8–23)
CALCIUM SERPL-MCNC: 9.3 MG/DL (ref 8.7–10.5)
CHLORIDE SERPL-SCNC: 108 MMOL/L (ref 95–110)
CHOLEST SERPL-MCNC: 116 MG/DL (ref 120–199)
CHOLEST/HDLC SERPL: 2.3 {RATIO} (ref 2–5)
CO2 SERPL-SCNC: 23 MMOL/L (ref 23–29)
CREAT SERPL-MCNC: 2.7 MG/DL (ref 0.5–1.4)
EST. GFR  (NO RACE VARIABLE): 26 ML/MIN/1.73 M^2
GLUCOSE SERPL-MCNC: 102 MG/DL (ref 70–110)
HDLC SERPL-MCNC: 50 MG/DL (ref 40–75)
HDLC SERPL: 43.1 % (ref 20–50)
LDLC SERPL CALC-MCNC: 41.6 MG/DL (ref 63–159)
NONHDLC SERPL-MCNC: 66 MG/DL
POTASSIUM SERPL-SCNC: 2.8 MMOL/L (ref 3.5–5.1)
SODIUM SERPL-SCNC: 140 MMOL/L (ref 136–145)
TRIGL SERPL-MCNC: 122 MG/DL (ref 30–150)

## 2022-09-02 PROCEDURE — 80048 BASIC METABOLIC PNL TOTAL CA: CPT | Performed by: INTERNAL MEDICINE

## 2022-09-02 PROCEDURE — 3008F PR BODY MASS INDEX (BMI) DOCUMENTED: ICD-10-PCS | Mod: CPTII,,, | Performed by: INTERNAL MEDICINE

## 2022-09-02 PROCEDURE — 3008F BODY MASS INDEX DOCD: CPT | Mod: CPTII,,, | Performed by: INTERNAL MEDICINE

## 2022-09-02 PROCEDURE — 99999 PR PBB SHADOW E&M-EST. PATIENT-LVL IV: CPT | Mod: PBBFAC,,, | Performed by: INTERNAL MEDICINE

## 2022-09-02 PROCEDURE — 3074F PR MOST RECENT SYSTOLIC BLOOD PRESSURE < 130 MM HG: ICD-10-PCS | Mod: CPTII,,, | Performed by: INTERNAL MEDICINE

## 2022-09-02 PROCEDURE — 99999 PR PBB SHADOW E&M-EST. PATIENT-LVL IV: ICD-10-PCS | Mod: PBBFAC,,, | Performed by: INTERNAL MEDICINE

## 2022-09-02 PROCEDURE — 3074F SYST BP LT 130 MM HG: CPT | Mod: CPTII,,, | Performed by: INTERNAL MEDICINE

## 2022-09-02 PROCEDURE — 99214 OFFICE O/P EST MOD 30 MIN: CPT | Mod: S$PBB,,, | Performed by: INTERNAL MEDICINE

## 2022-09-02 PROCEDURE — 99214 PR OFFICE/OUTPT VISIT, EST, LEVL IV, 30-39 MIN: ICD-10-PCS | Mod: S$PBB,,, | Performed by: INTERNAL MEDICINE

## 2022-09-02 PROCEDURE — 36415 COLL VENOUS BLD VENIPUNCTURE: CPT | Mod: PO | Performed by: INTERNAL MEDICINE

## 2022-09-02 PROCEDURE — 3078F PR MOST RECENT DIASTOLIC BLOOD PRESSURE < 80 MM HG: ICD-10-PCS | Mod: CPTII,,, | Performed by: INTERNAL MEDICINE

## 2022-09-02 PROCEDURE — 80061 LIPID PANEL: CPT | Performed by: INTERNAL MEDICINE

## 2022-09-02 PROCEDURE — 3078F DIAST BP <80 MM HG: CPT | Mod: CPTII,,, | Performed by: INTERNAL MEDICINE

## 2022-09-02 PROCEDURE — 1159F MED LIST DOCD IN RCRD: CPT | Mod: CPTII,,, | Performed by: INTERNAL MEDICINE

## 2022-09-02 PROCEDURE — 99214 OFFICE O/P EST MOD 30 MIN: CPT | Mod: PBBFAC,PO | Performed by: INTERNAL MEDICINE

## 2022-09-02 PROCEDURE — 1159F PR MEDICATION LIST DOCUMENTED IN MEDICAL RECORD: ICD-10-PCS | Mod: CPTII,,, | Performed by: INTERNAL MEDICINE

## 2022-09-02 RX ORDER — GABAPENTIN 300 MG/1
300 CAPSULE ORAL 3 TIMES DAILY PRN
Qty: 90 CAPSULE | Refills: 11 | Status: ON HOLD | OUTPATIENT
Start: 2022-09-02 | End: 2022-11-12

## 2022-09-02 RX ORDER — NIFEDIPINE 60 MG/1
60 TABLET, EXTENDED RELEASE ORAL DAILY
Qty: 90 TABLET | Refills: 1 | Status: ON HOLD | OUTPATIENT
Start: 2022-09-02 | End: 2022-11-12

## 2022-09-02 NOTE — PROGRESS NOTES
Patient ID: Domingo Gross is a 62 y.o. male.    Chief Complaint: Results    MEJIA Chirinos is a 62 y.o. male with chronic anemia, CKD stage 4, CAD, PAD, HTN, HLD, COPD, suspect skin cancer and history of cardiac arrest  who presents for routine follow-up of medical conditions.  Since his last visit with me, he has had a stent put into his right lower extremity by Dr. Jenkins.  We reviewed his last BMP, CBC, iron, cholesterol labs together today.  He follows routinely with Hematology/Oncology for issue of iron deficiency anemia.  He also follows with nephrology for chronic kidney disease stage 4.  He has no acute complaints or concerns today.    Health Maintenance Topics with due status: Not Due       Topic Last Completion Date    Lipid Panel 08/20/2021    Colorectal Cancer Screening 01/06/2022    PROSTATE-SPECIFIC ANTIGEN 04/25/2022      Review of Systems   All other systems reviewed and are negative.      Objective:     Vitals:    09/02/22 1122   BP: (!) 122/58   Pulse: 68        Physical Exam  Vitals reviewed.   Constitutional:       General: He is not in acute distress.     Appearance: Normal appearance. He is well-developed. He is not ill-appearing, toxic-appearing or diaphoretic.   HENT:      Head: Normocephalic and atraumatic.      Right Ear: External ear normal.      Left Ear: External ear normal.      Nose: Nose normal.   Eyes:      Extraocular Movements: Extraocular movements intact.      Conjunctiva/sclera: Conjunctivae normal.   Cardiovascular:      Rate and Rhythm: Normal rate and regular rhythm.      Pulses: Normal pulses.      Heart sounds: Normal heart sounds. No murmur heard.    No friction rub. No gallop.   Pulmonary:      Effort: Pulmonary effort is normal. No respiratory distress.      Breath sounds: Normal breath sounds. No stridor. No wheezing, rhonchi or rales.   Musculoskeletal:      Right lower leg: No edema.      Left lower leg: No edema.   Skin:     General: Skin is warm and dry.    Neurological:      General: No focal deficit present.      Mental Status: He is alert and oriented to person, place, and time. Mental status is at baseline.   Psychiatric:         Mood and Affect: Mood normal.         Behavior: Behavior normal.         Thought Content: Thought content normal.         Judgment: Judgment normal.       Assessment:       1. Essential hypertension Well controlled   2. Hypokalemia Chronic   3. Hyperlipidemia, unspecified hyperlipidemia type Well controlled   4. Chronic back pain, unspecified back location, unspecified back pain laterality Chronic   5. CKD (chronic kidney disease) stage 4, GFR 15-29 ml/min Chronic   6. Iron deficiency anemia, unspecified iron deficiency anemia type Chronic   7. Annual physical exam          Plan:         Essential hypertension  Comments:  Continue current medication.  Orders:  -     NIFEdipine (PROCARDIA-XL) 60 MG (OSM) 24 hr tablet; Take 1 tablet (60 mg total) by mouth once daily.  Dispense: 90 tablet; Refill: 1    Hypokalemia  Comments:  Recheck K. (Has had episodes of hypo and hyper kalemia, reason for this is unclear; follows with nephrology).  Orders:  -     BASIC METABOLIC PANEL; Future; Expected date: 09/02/2022    Hyperlipidemia, unspecified hyperlipidemia type  Comments:  Continue current medication  Orders:  -     Lipid Panel; Future; Expected date: 09/02/2022    Chronic back pain, unspecified back location, unspecified back pain laterality  Comments:  Continue current medication  Orders:  -     gabapentin (NEURONTIN) 300 MG capsule; Take 1 capsule (300 mg total) by mouth 3 (three) times daily as needed (back pain).  Dispense: 90 capsule; Refill: 11    CKD (chronic kidney disease) stage 4, GFR 15-29 ml/min  Comments:  Managed by Nephrology.    Iron deficiency anemia, unspecified iron deficiency anemia type  Comments:  Managed by Hematology/Oncology.    Annual physical exam  -     CBC Auto Differential; Future; Expected date: 02/01/2023  -      Comprehensive Metabolic Panel; Future; Expected date: 02/01/2023  -     Hemoglobin A1C; Future; Expected date: 02/01/2023  -     Lipid Panel; Future; Expected date: 02/01/2023  -     PSA, Screening; Future; Expected date: 02/01/2023  -     TSH; Future; Expected date: 02/01/2023      RTC 6 months for annual exam     Warning signs discussed, patient to call with any further issues or worsening of symptoms.       Parts of the above note were dictated using a voice dictation software. Please excuse any grammatical or typographical errors.

## 2022-09-06 DIAGNOSIS — E87.6 HYPOKALEMIA: Primary | ICD-10-CM

## 2022-09-06 RX ORDER — POTASSIUM CHLORIDE 20 MEQ/1
20 TABLET, EXTENDED RELEASE ORAL DAILY
Qty: 5 TABLET | Refills: 0 | Status: SHIPPED | OUTPATIENT
Start: 2022-09-06 | End: 2022-09-11

## 2022-09-07 NOTE — PROGRESS NOTES
CHW - Outreach Attempt    Community Health Worker left a voicemail message for 1st attempt to contact patient  Taran Gross regarding: SDOH  Community Health Worker to attempt to contact patient on: 10/10/22        CHW - Case Closure    This Community Health Worker spoke to patient via telephone today.   Pt/Caregiver reported: Pt declined needing assistance and disconnected the line, CHW updated the SDOH to reflect the response of the pt  Pt/Caregiver denied any additional needs at this time and agrees with episode closure at this time.  Provided patient with Community Health Worker's contact information and encouraged him/her to contact this Community Health Worker if additional needs arise.

## 2022-09-12 ENCOUNTER — PATIENT MESSAGE (OUTPATIENT)
Dept: INTERNAL MEDICINE | Facility: CLINIC | Age: 62
End: 2022-09-12
Payer: MEDICAID

## 2022-09-13 DIAGNOSIS — R53.83 FATIGUE, UNSPECIFIED TYPE: Primary | ICD-10-CM

## 2022-09-14 ENCOUNTER — LAB VISIT (OUTPATIENT)
Dept: LAB | Facility: HOSPITAL | Age: 62
End: 2022-09-14
Attending: INTERNAL MEDICINE
Payer: MEDICAID

## 2022-09-14 ENCOUNTER — TELEPHONE (OUTPATIENT)
Dept: INTERNAL MEDICINE | Facility: CLINIC | Age: 62
End: 2022-09-14
Payer: MEDICAID

## 2022-09-14 DIAGNOSIS — R53.83 FATIGUE, UNSPECIFIED TYPE: ICD-10-CM

## 2022-09-14 LAB
ANION GAP SERPL CALC-SCNC: 12 MMOL/L (ref 8–16)
BASOPHILS # BLD AUTO: 0.03 K/UL (ref 0–0.2)
BASOPHILS NFR BLD: 0.5 % (ref 0–1.9)
BUN SERPL-MCNC: 30 MG/DL (ref 8–23)
CALCIUM SERPL-MCNC: 9.3 MG/DL (ref 8.7–10.5)
CHLORIDE SERPL-SCNC: 106 MMOL/L (ref 95–110)
CO2 SERPL-SCNC: 21 MMOL/L (ref 23–29)
CREAT SERPL-MCNC: 2.4 MG/DL (ref 0.5–1.4)
DIFFERENTIAL METHOD: ABNORMAL
EOSINOPHIL # BLD AUTO: 0.2 K/UL (ref 0–0.5)
EOSINOPHIL NFR BLD: 3.8 % (ref 0–8)
ERYTHROCYTE [DISTWIDTH] IN BLOOD BY AUTOMATED COUNT: 12.9 % (ref 11.5–14.5)
EST. GFR  (NO RACE VARIABLE): 29.9 ML/MIN/1.73 M^2
GLUCOSE SERPL-MCNC: 96 MG/DL (ref 70–110)
HCT VFR BLD AUTO: 24.8 % (ref 40–54)
HGB BLD-MCNC: 8.4 G/DL (ref 14–18)
IMM GRANULOCYTES # BLD AUTO: 0.01 K/UL (ref 0–0.04)
IMM GRANULOCYTES NFR BLD AUTO: 0.2 % (ref 0–0.5)
IRON SERPL-MCNC: 63 UG/DL (ref 45–160)
LYMPHOCYTES # BLD AUTO: 1.2 K/UL (ref 1–4.8)
LYMPHOCYTES NFR BLD: 20.2 % (ref 18–48)
MCH RBC QN AUTO: 29.8 PG (ref 27–31)
MCHC RBC AUTO-ENTMCNC: 33.9 G/DL (ref 32–36)
MCV RBC AUTO: 88 FL (ref 82–98)
MONOCYTES # BLD AUTO: 0.6 K/UL (ref 0.3–1)
MONOCYTES NFR BLD: 9.5 % (ref 4–15)
NEUTROPHILS # BLD AUTO: 4 K/UL (ref 1.8–7.7)
NEUTROPHILS NFR BLD: 65.8 % (ref 38–73)
NRBC BLD-RTO: 0 /100 WBC
PLATELET # BLD AUTO: 157 K/UL (ref 150–450)
PMV BLD AUTO: 10.7 FL (ref 9.2–12.9)
POTASSIUM SERPL-SCNC: 3 MMOL/L (ref 3.5–5.1)
RBC # BLD AUTO: 2.82 M/UL (ref 4.6–6.2)
SATURATED IRON: 18 % (ref 20–50)
SODIUM SERPL-SCNC: 139 MMOL/L (ref 136–145)
T4 FREE SERPL-MCNC: 0.83 NG/DL (ref 0.71–1.51)
TOTAL IRON BINDING CAPACITY: 349 UG/DL (ref 250–450)
TRANSFERRIN SERPL-MCNC: 236 MG/DL (ref 200–375)
TSH SERPL DL<=0.005 MIU/L-ACNC: 4.31 UIU/ML (ref 0.4–4)
VIT B12 SERPL-MCNC: 303 PG/ML (ref 210–950)
WBC # BLD AUTO: 6 K/UL (ref 3.9–12.7)

## 2022-09-14 PROCEDURE — 85025 COMPLETE CBC W/AUTO DIFF WBC: CPT | Performed by: INTERNAL MEDICINE

## 2022-09-14 PROCEDURE — 80048 BASIC METABOLIC PNL TOTAL CA: CPT | Performed by: INTERNAL MEDICINE

## 2022-09-14 PROCEDURE — 84439 ASSAY OF FREE THYROXINE: CPT | Performed by: INTERNAL MEDICINE

## 2022-09-14 PROCEDURE — 84466 ASSAY OF TRANSFERRIN: CPT | Performed by: INTERNAL MEDICINE

## 2022-09-14 PROCEDURE — 36415 COLL VENOUS BLD VENIPUNCTURE: CPT | Mod: PO | Performed by: INTERNAL MEDICINE

## 2022-09-14 PROCEDURE — 82607 VITAMIN B-12: CPT | Performed by: INTERNAL MEDICINE

## 2022-09-14 PROCEDURE — 84443 ASSAY THYROID STIM HORMONE: CPT | Performed by: INTERNAL MEDICINE

## 2022-09-14 NOTE — TELEPHONE ENCOUNTER
Potassium low at 2.8. Will send in a few days of potassium and then we need to recheck level in about one week with BMP lab.  Kidney function consistent with chronic kidney disease stage 4.  This is chronic and stable.  Cholesterol is well controlled    Patient notified of everything above . Patient voices understanding.

## 2022-09-14 NOTE — TELEPHONE ENCOUNTER
----- Message from Analy Encarnacion MD sent at 9/6/2022  1:36 PM CDT -----  Potassium low at 2.8. Will send in a few days of potassium and then we need to recheck level in about one week with BMP lab.  Kidney function consistent with chronic kidney disease stage 4.  This is chronic and stable.  Cholesterol is well controlled.

## 2022-09-24 ENCOUNTER — PATIENT MESSAGE (OUTPATIENT)
Dept: INTERNAL MEDICINE | Facility: CLINIC | Age: 62
End: 2022-09-24
Payer: MEDICAID

## 2022-09-25 ENCOUNTER — PATIENT MESSAGE (OUTPATIENT)
Dept: CARDIOLOGY | Facility: CLINIC | Age: 62
End: 2022-09-25
Payer: MEDICAID

## 2022-09-26 DIAGNOSIS — E87.6 HYPOKALEMIA: Primary | ICD-10-CM

## 2022-09-26 RX ORDER — POTASSIUM CHLORIDE 20 MEQ/1
20 TABLET, EXTENDED RELEASE ORAL DAILY
Qty: 4 TABLET | Refills: 0 | Status: SHIPPED | OUTPATIENT
Start: 2022-09-26 | End: 2022-09-30

## 2022-10-04 ENCOUNTER — TELEPHONE (OUTPATIENT)
Dept: INTERNAL MEDICINE | Facility: CLINIC | Age: 62
End: 2022-10-04
Payer: MEDICAID

## 2022-10-07 ENCOUNTER — PATIENT MESSAGE (OUTPATIENT)
Dept: INTERNAL MEDICINE | Facility: CLINIC | Age: 62
End: 2022-10-07
Payer: MEDICAID

## 2022-10-11 ENCOUNTER — TELEPHONE (OUTPATIENT)
Dept: INTERNAL MEDICINE | Facility: CLINIC | Age: 62
End: 2022-10-11
Payer: MEDICAID

## 2022-10-11 ENCOUNTER — HOSPITAL ENCOUNTER (EMERGENCY)
Facility: HOSPITAL | Age: 62
Discharge: HOME | End: 2022-10-11
Attending: EMERGENCY MEDICINE
Payer: MEDICAID

## 2022-10-11 ENCOUNTER — LAB VISIT (OUTPATIENT)
Dept: LAB | Facility: HOSPITAL | Age: 62
End: 2022-10-11
Attending: INTERNAL MEDICINE
Payer: MEDICAID

## 2022-10-11 ENCOUNTER — PATIENT MESSAGE (OUTPATIENT)
Dept: INTERNAL MEDICINE | Facility: CLINIC | Age: 62
End: 2022-10-11
Payer: MEDICAID

## 2022-10-11 VITALS
DIASTOLIC BLOOD PRESSURE: 59 MMHG | TEMPERATURE: 99 F | SYSTOLIC BLOOD PRESSURE: 123 MMHG | WEIGHT: 190 LBS | HEIGHT: 70 IN | RESPIRATION RATE: 14 BRPM | OXYGEN SATURATION: 99 % | HEART RATE: 66 BPM | BODY MASS INDEX: 27.2 KG/M2

## 2022-10-11 DIAGNOSIS — E87.6 HYPOKALEMIA: ICD-10-CM

## 2022-10-11 DIAGNOSIS — N18.9 CHRONIC KIDNEY DISEASE, UNSPECIFIED CKD STAGE: Primary | ICD-10-CM

## 2022-10-11 LAB
ANION GAP SERPL CALC-SCNC: 14 MMOL/L (ref 8–16)
ANION GAP SERPL CALC-SCNC: 9 MMOL/L (ref 8–16)
BUN SERPL-MCNC: 26 MG/DL (ref 8–23)
BUN SERPL-MCNC: 28 MG/DL (ref 8–23)
CALCIUM SERPL-MCNC: 8.8 MG/DL (ref 8.7–10.5)
CALCIUM SERPL-MCNC: 9.1 MG/DL (ref 8.7–10.5)
CHLORIDE SERPL-SCNC: 104 MMOL/L (ref 95–110)
CHLORIDE SERPL-SCNC: 106 MMOL/L (ref 95–110)
CO2 SERPL-SCNC: 22 MMOL/L (ref 23–29)
CO2 SERPL-SCNC: 24 MMOL/L (ref 23–29)
CREAT SERPL-MCNC: 2.2 MG/DL (ref 0.5–1.4)
CREAT SERPL-MCNC: 2.6 MG/DL (ref 0.5–1.4)
EST. GFR  (NO RACE VARIABLE): 27 ML/MIN/1.73 M^2
EST. GFR  (NO RACE VARIABLE): 33.2 ML/MIN/1.73 M^2
GLUCOSE SERPL-MCNC: 101 MG/DL (ref 70–110)
GLUCOSE SERPL-MCNC: 147 MG/DL (ref 70–110)
MAGNESIUM SERPL-MCNC: 1.8 MG/DL (ref 1.6–2.6)
POTASSIUM SERPL-SCNC: 2.6 MMOL/L (ref 3.5–5.1)
POTASSIUM SERPL-SCNC: 2.9 MMOL/L (ref 3.5–5.1)
SODIUM SERPL-SCNC: 137 MMOL/L (ref 136–145)
SODIUM SERPL-SCNC: 142 MMOL/L (ref 136–145)

## 2022-10-11 PROCEDURE — 83735 ASSAY OF MAGNESIUM: CPT | Performed by: EMERGENCY MEDICINE

## 2022-10-11 PROCEDURE — 99284 EMERGENCY DEPT VISIT MOD MDM: CPT

## 2022-10-11 PROCEDURE — 93010 ELECTROCARDIOGRAM REPORT: CPT | Mod: ,,, | Performed by: INTERNAL MEDICINE

## 2022-10-11 PROCEDURE — 93010 EKG 12-LEAD: ICD-10-PCS | Mod: ,,, | Performed by: INTERNAL MEDICINE

## 2022-10-11 PROCEDURE — 93005 ELECTROCARDIOGRAM TRACING: CPT

## 2022-10-11 PROCEDURE — 80048 BASIC METABOLIC PNL TOTAL CA: CPT | Performed by: INTERNAL MEDICINE

## 2022-10-11 PROCEDURE — 80048 BASIC METABOLIC PNL TOTAL CA: CPT | Mod: 91 | Performed by: NURSE PRACTITIONER

## 2022-10-11 PROCEDURE — 36415 COLL VENOUS BLD VENIPUNCTURE: CPT | Mod: PO | Performed by: INTERNAL MEDICINE

## 2022-10-11 RX ORDER — POTASSIUM CHLORIDE 20 MEQ/1
20 TABLET, EXTENDED RELEASE ORAL DAILY
Qty: 7 TABLET | Refills: 0 | Status: SHIPPED | OUTPATIENT
Start: 2022-10-11 | End: 2022-10-18

## 2022-10-11 NOTE — FIRST PROVIDER EVALUATION
" Emergency Department TeleTriage Encounter Note      CHIEF COMPLAINT    Chief Complaint   Patient presents with    Abnormal Lab     C/O having routine blood work this morning and being called by MD to come in for potassium of 2.6, denies symptoms at this time, reports hx of low potassium, states "my doctor had me take potassium pills last week"       VITAL SIGNS   Initial Vitals [10/11/22 1751]   BP Pulse Resp Temp SpO2   (!) 126/58 71 18 98.5 °F (36.9 °C) 98 %      MAP       --            ALLERGIES    Review of patient's allergies indicates:   Allergen Reactions    Ace inhibitors Rash       PROVIDER TRIAGE NOTE  TeleTriage Note: Domingo Gross, a nontoxic/well appearing, 62 y.o. male, presented to the ED with c/o sent to ED by his PCP for low potassium. Patient has no complaints at this time.    All ED beds are full at present; patient notified of this status.  Patient seen and medically screened by Nurse Practitioner via teletriage. Orders initiated at triage to expedite care.  Patient is stable to return to the waiting room and will be placed in an ED bed when available.  Care will be transferred to an alternate provider when patient has been placed in an Exam Room from the [facility] for physical exam, additional orders, and disposition.  6:32 PM Tracy Alfaro DNP, FNP-C        ORDERS  Labs Reviewed   BASIC METABOLIC PANEL       ED Orders (720h ago, onward)      Start Ordered     Status Ordering Provider    10/11/22 1833 10/11/22 1833  Insert peripheral IV  Continuous         Ordered TRACY ALFARO    10/11/22 1833 10/11/22 1833  Basic metabolic panel  STAT         Ordered TRACY ALFARO    10/11/22 1833 10/11/22 1833  EKG 12-lead  Once         Ordered TRACY ALFARO    10/11/22 1754 10/11/22 1754  EKG 12-lead  Once         Completed by MAYI ESPITIA on 10/11/2022 at  5:57 PM JAVIER LAYTON              Virtual Visit Note: The provider triage portion of this emergency department evaluation and " documentation was performed via [employer], a HIPAA-compliant telemedicine application, in concert with a tele-presenter in the room. A face to face patient evaluation with one of my colleagues will occur once the patient is placed in an emergency department room.      DISCLAIMER: This note was prepared with [employer] voice recognition transcription software. Garbled syntax, mangled pronouns, and other bizarre constructions may be attributed to that software system.

## 2022-10-12 ENCOUNTER — PATIENT MESSAGE (OUTPATIENT)
Dept: INTERNAL MEDICINE | Facility: CLINIC | Age: 62
End: 2022-10-12
Payer: MEDICAID

## 2022-10-12 NOTE — ED PROVIDER NOTES
"Encounter Date: 10/11/2022       History     Chief Complaint   Patient presents with    Abnormal Lab     C/O having routine blood work this morning and being called by MD to come in for potassium of 2.6, denies symptoms at this time, reports hx of low potassium, states "my doctor had me take potassium pills last week"     Domingo Gross is a 62 y.o. male who  has a past medical history of Allergy, Anemia, Anticoagulant long-term use, Arthritis, CKD (chronic kidney disease) stage 4, GFR 15-29 ml/min, COPD (chronic obstructive pulmonary disease) (8/20/2021), Coronary artery disease, Heart attack (10/04/2019), Hematuria, Hemothorax, Hyperlipidemia, Hypertension, Hyperuricemia, Hypocalcemia, Hypokalemia, Hypophosphatemia, PAD (peripheral artery disease), Proteinuria, and Vitamin D deficiency.    The patient presents to the ED due to low potassium.   Patient states he was told to come to the ED by his PCP after labs done this morning showed low K.  He has history of low K and admission for high K in the past. Last week it was low as well, and he was prescribed a few days of KCl to take.   He denies any recent illness, N/V/D, muscle pain/cramps, palpitations, CP, SOB, or any other concerns. He feels somewhat fatigued, but otherwise feels fine and has no complaints.       Review of patient's allergies indicates:   Allergen Reactions    Ace inhibitors Rash     Past Medical History:   Diagnosis Date    Allergy     Anemia     Anticoagulant long-term use     Arthritis     CKD (chronic kidney disease) stage 4, GFR 15-29 ml/min     COPD (chronic obstructive pulmonary disease) 8/20/2021    Coronary artery disease     Heart attack 10/04/2019    Hematuria     Hemothorax     Hyperlipidemia     Hypertension     Hyperuricemia     Hypocalcemia     Hypokalemia     Hypophosphatemia     PAD (peripheral artery disease)     Proteinuria     Vitamin D deficiency      Past Surgical History:   Procedure Laterality Date    ABDOMINAL " AORTOGRAPHY N/A 5/10/2019    Procedure: AORTOGRAM-ABDOMINAL;  Surgeon: Keo Jenkins MD;  Location: [facility] CATH LAB/EP;  Service: Cardiology;  Laterality: N/A;  RSFA intervention     AORTOGRAPHY WITH SERIALOGRAPHY N/A 12/3/2021    Procedure: AORTOGRAM, WITH SERIALOGRAPHY;  Surgeon: Keo Jenkins MD;  Location: [facility] CATH LAB/EP;  Service: Cardiology;  Laterality: N/A;    AORTOGRAPHY WITH SERIALOGRAPHY N/A 4/1/2022    Procedure: AORTOGRAM, WITH SERIALOGRAPHY;  Surgeon: Keo Jenkins MD;  Location: [facility] CATH LAB/EP;  Service: Cardiology;  Laterality: N/A;    BONE MARROW BIOPSY Right 10/12/2021    Procedure: BIOPSY-BONE MARROW;  Surgeon: Naseem Woods MD;  Location: [facility] OR;  Service: Oncology;  Laterality: Right;    CARDIAC CATHETERIZATION      CATARACT EXTRACTION      CATARACT EXTRACTION W/  INTRAOCULAR LENS IMPLANT Right 6/8/2020    Procedure: EXTRACTION, CATARACT, WITH IOL INSERTION;  Surgeon: Tin Light MD;  Location: [facility] OR;  Service: Ophthalmology;  Laterality: Right;    CATARACT EXTRACTION W/  INTRAOCULAR LENS IMPLANT Left 7/2/2020    Procedure: EXTRACTION, CATARACT, WITH IOL INSERTION;  Surgeon: Tin Light MD;  Location: [facility] OR;  Service: Ophthalmology;  Laterality: Left;    COLONOSCOPY N/A 1/6/2022    Procedure: COLONOSCOPY;  Surgeon: Kathe Penaloza MD;  Location: [address]);  Service: Endoscopy;  Laterality: N/A;  COVID test at [facility] on 1/3-GT  okay to hold Plavix for 5 days and aspirin per Dr. Becerra  2nd floor due toextensive cardiac history   instructions [facility] -     CORONARY ANGIOGRAPHY N/A 3/29/2019    Procedure: ANGIOGRAM, CORONARY ARTERY;  Surgeon: Keo Jenkins MD;  Location: [facility] CATH LAB/EP;  Service: Cardiology;  Laterality: N/A;    CORONARY ANGIOGRAPHY Left 10/11/2019    Procedure: ANGIOGRAM, CORONARY ARTERY;  Surgeon: Keo Jenkins MD;  Location: [facility] CATH LAB/EP;  Service: Cardiology;  Laterality: Left;    CORONARY ANGIOPLASTY WITH  STENT PLACEMENT  2019    mid and distal RCA    ESOPHAGOGASTRODUODENOSCOPY N/A 2022    Procedure: EGD (ESOPHAGOGASTRODUODENOSCOPY);  Surgeon: Kathe Penaloza MD;  Location: [facility] ENDO ([address] [place] [address]);  Service: Endoscopy;  Laterality: N/A;    EYE SURGERY      INTRAVASCULAR ULTRASOUND, NON-CORONARY  2022    Procedure: Intravascular Ultrasound, Non-Coronary;  Surgeon: Keo Jenkins MD;  Location: [facility] CATH LAB/EP;  Service: Cardiology;;    LEFT HEART CATHETERIZATION N/A 3/29/2019    Procedure: Left heart cath;  Surgeon: Keo Jenkins MD;  Location: [facility] CATH LAB/EP;  Service: Cardiology;  Laterality: N/A;    LEFT HEART CATHETERIZATION Left 10/8/2019    Procedure: Left heart cath;  Surgeon: Keo Jenkins MD;  Location: [facility] CATH LAB/EP;  Service: Cardiology;  Laterality: Left;    PERCUTANEOUS TRANSLUMINAL ANGIOPLASTY (PTA) OF PERIPHERAL VESSEL N/A 2019    Procedure: PTA, PERIPHERAL VESSEL;  Surgeon: Keo Jenkins MD;  Location: [facility] CATH LAB/EP;  Service: Cardiology;  Laterality: N/A;    PERCUTANEOUS TRANSLUMINAL ANGIOPLASTY (PTA) OF PERIPHERAL VESSEL Left 2022    Procedure: PTA, PERIPHERAL VESSEL;  Surgeon: Keo Jenkins MD;  Location: [facility] CATH LAB/EP;  Service: Cardiology;  Laterality: Left;    PERCUTANEOUS TRANSLUMINAL ANGIOPLASTY (PTA) OF PERIPHERAL VESSEL Right 2022    Procedure: PTA, PERIPHERAL VESSEL;  Surgeon: Keo Jenkins MD;  Location: [facility] CATH LAB/EP;  Service: Cardiology;  Laterality: Right;     Family History   Problem Relation Age of Onset    Aneurysm Mother     Hypertension Mother     Heart disease Mother     Cancer Father     Diabetes Sister     Hypertension Sister     No Known Problems Brother     No Known Problems Son      Social History     Tobacco Use    Smoking status: Former     Types: Cigarettes     Quit date: 1999     Years since quittin.4    Smokeless tobacco: Never   Substance Use Topics    Alcohol use: No     Alcohol/week: 0.0 standard drinks     Drug use: No     Review of Systems   Constitutional:  Positive for fatigue. Negative for chills and fever.   HENT:  Negative for sore throat.    Respiratory:  Negative for shortness of breath.    Cardiovascular:  Negative for chest pain.   Gastrointestinal:  Negative for constipation, diarrhea, nausea and vomiting.   Genitourinary:  Negative for dysuria, frequency and urgency.   Musculoskeletal:  Negative for back pain.   Skin:  Negative for rash and wound.   Neurological:  Negative for weakness.   Hematological:  Does not bruise/bleed easily.   Psychiatric/Behavioral:  Negative for agitation, behavioral problems and confusion.      Physical Exam     Initial Vitals [10/11/22 1751]   BP Pulse Resp Temp SpO2   (!) 126/58 71 18 98.5 °F (36.9 °C) 98 %      MAP       --         Physical Exam    Nursing note and vitals reviewed.  Constitutional: He appears well-developed and well-nourished. He is not diaphoretic. No distress.   Well-appearing, no distress.    HENT:   Head: Normocephalic and atraumatic.   Mouth/Throat: Oropharynx is clear and moist.   Eyes: EOM are normal. Pupils are equal, round, and reactive to light.   Neck: No tracheal deviation present.   Cardiovascular:  Normal rate, regular rhythm, normal heart sounds and intact distal pulses.           Pulmonary/Chest: Breath sounds normal. No stridor. No respiratory distress.   Abdominal: Abdomen is soft. He exhibits no distension and no mass. There is no abdominal tenderness.   Musculoskeletal:         General: No edema. Normal range of motion.     Neurological: He is alert and oriented to person, place, and time. No cranial nerve deficit or sensory deficit.   Skin: Skin is warm and dry. Capillary refill takes less than 2 seconds. No rash noted.   Psychiatric: He has a normal mood and affect. His behavior is normal. Thought content normal.       ED Course   Procedures  Labs Reviewed   BASIC METABOLIC PANEL - Abnormal; Notable for the following components:        Result Value    Potassium 2.9 (*)     CO2 22 (*)     BUN 28 (*)     Creatinine 2.6 (*)     eGFR 27 (*)     All other components within normal limits   MAGNESIUM     EKG Readings: (Independently Interpreted)   Initial Reading: No STEMI. Previous EKG: Compared with most recent EKG Rhythm: Normal Sinus Rhythm.   NSR, rate 72, occasional PAC, non-specific ST changes inferior leads, no ST elevations or acute ischemia, normal intervals.  Stable from prior 08/2022.      Imaging Results    None          Medications - No data to display  Medical Decision Making:   History:   Old Medical Records: I decided to obtain old medical records.  Old Records Summarized: records from clinic visits and other records.       <> Summary of Records: Labs from earlier today revealed K 2.6.  History of hyperkalemia requiring hospitalization in the past.  Initial Assessment:   63 yo M with CKF sent to ED for low potassium.  Will recheck BMP, obtain Mg, treat with supplemental K, and reassess.   Differential Diagnosis:   DDX:  Dehydration, medication side effect, renal disease, hypomagnesemia.   Clinical Tests:   Lab Tests: Reviewed and Ordered  Medical Tests: Ordered and Reviewed    On re-evaluation, the patient's status has improved.  After complete ED evaluation, clinical impression is most consistent with hypokalemia.  PCP follow-up within 2-3 days was recommended.    After taking into careful account the patient's history, physical exam findings, as well as empirical and objective data obtained throughout ED workup, I feel no emergent medical condition has been identified. No further evaluation or admission was felt to be required, and the patient is stable for discharge from the ED. The patient and any additional family present were updated with test results, overall clinical impression, and recommended further plan of care, including discharge instructions as provided and outpatient follow-up for continued evaluation and management as  needed. All questions were answered. The patient expressed understanding and agreed with current plan for discharge and follow-up plan of care. Strict ED return precautions were provided, including return/worsening of current symptoms, new symptoms, or any other concerns.               ED Course as of 10/11/22 2356   Tue Oct 11, 2022   2150 K low, 2.9, magnesium normal. Patient largely asymptomatic, will DC with KCL and close OP f/u. Given strict return precautions.  [SS]      ED Course User Index  [SS] Aneesh Mendieta MD                 Clinical Impression:   Final diagnoses:  [E87.6] Hypokalemia  [N18.9] Chronic kidney disease, unspecified CKD stage (Primary)        ED Disposition Condition    Discharge Stable          ED Prescriptions       Medication Sig Dispense Start Date End Date Auth. Provider    potassium chloride SA (K-DUR,KLOR-CON) 20 MEQ tablet Take 1 tablet (20 mEq total) by mouth once daily. for 7 days 7 tablet 10/11/2022 10/18/2022 Aneesh Mendieta MD          Follow-up Information       Follow up With Specialties Details Why Contact Info    Anlay Encarnacion MD Internal Medicine Schedule an appointment as soon as possible for a visit   [address] [facility] [id number] [phone]               Aneesh Mendieta MD  10/11/22 2356

## 2022-10-12 NOTE — ED NOTES
Pt discharged with all personal belongings.   Pt in no acute distress prior to dc  Pt denies chest pain or sob.   Pt encouraged to make follow up apt with Renal MD and to return to ED for any further concerns.   Breathing E/U, pt ambulated out of ed

## 2022-10-12 NOTE — DISCHARGE INSTRUCTIONS
Thank you for choosing [facility]!     Our goal in the Emergency Department is to always provide outstanding medical care. You may receive a survey by mail or e-mail in the next week regarding your experience today. We would greatly appreciate you completing and returning the survey. Your feedback provides us with a way to recognize our staff who provide very good care, and it helps us learn how to improve when your experience was below our aspiration of excellence.      We appreciate you trusting us with your medical care. We hope you feel better soon. We will be happy to take care of you for all of your future medical needs.    Sincerely,    Aneesh Mendieta Jr., MD  [facility] Physician

## 2022-10-12 NOTE — ED NOTES
"Pt to ED at this time after primary MD stated abnormal lab values, reportedly low K   Pt at this time NSR, HR in 60's. Pt aaox4, speaks in full sentences without SOB  Pt breathing E/U. No cyanosis or accessory muscle use.   Pt displays no weakness, ambulated to room without difficulty.   Pt states has been taking K supplements only for the last week.   States incisions are healed, "was where they put stents in me awhile back".   "

## 2022-10-13 DIAGNOSIS — E87.6 HYPOKALEMIA: Primary | ICD-10-CM

## 2022-10-29 ENCOUNTER — PATIENT MESSAGE (OUTPATIENT)
Dept: INTERNAL MEDICINE | Facility: CLINIC | Age: 62
End: 2022-10-29
Payer: MEDICAID

## 2022-10-31 RX ORDER — ALBUTEROL SULFATE 90 UG/1
2 AEROSOL, METERED RESPIRATORY (INHALATION) EVERY 6 HOURS PRN
Qty: 18 G | Refills: 0 | OUTPATIENT
Start: 2022-10-31 | End: 2023-10-31

## 2022-10-31 RX ORDER — ALBUTEROL SULFATE 90 UG/1
2 AEROSOL, METERED RESPIRATORY (INHALATION) EVERY 6 HOURS PRN
Qty: 18 G | Refills: 0 | Status: SHIPPED | OUTPATIENT
Start: 2022-10-31 | End: 2023-10-09

## 2022-10-31 NOTE — TELEPHONE ENCOUNTER
No new care gaps identified.  [facility] Embedded Care Gaps. Reference number: [id number]. 10/31/2022   9:29:00 AM CDT

## 2022-11-03 ENCOUNTER — PATIENT MESSAGE (OUTPATIENT)
Dept: INTERNAL MEDICINE | Facility: CLINIC | Age: 62
End: 2022-11-03
Payer: MEDICAID

## 2022-11-04 ENCOUNTER — DOCUMENTATION ONLY (OUTPATIENT)
Dept: INTERNAL MEDICINE | Facility: CLINIC | Age: 62
End: 2022-11-04
Payer: MEDICAID

## 2022-11-04 ENCOUNTER — LAB VISIT (OUTPATIENT)
Dept: LAB | Facility: HOSPITAL | Age: 62
End: 2022-11-04
Attending: INTERNAL MEDICINE
Payer: MEDICAID

## 2022-11-04 DIAGNOSIS — N18.4 CKD (CHRONIC KIDNEY DISEASE) STAGE 4, GFR 15-29 ML/MIN: Primary | ICD-10-CM

## 2022-11-04 DIAGNOSIS — E87.6 HYPOKALEMIA: ICD-10-CM

## 2022-11-04 DIAGNOSIS — E87.6 LOW SERUM POTASSIUM: ICD-10-CM

## 2022-11-04 LAB
ANION GAP SERPL CALC-SCNC: 14 MMOL/L (ref 8–16)
BUN SERPL-MCNC: 25 MG/DL (ref 8–23)
CALCIUM SERPL-MCNC: 8.6 MG/DL (ref 8.7–10.5)
CHLORIDE SERPL-SCNC: 107 MMOL/L (ref 95–110)
CO2 SERPL-SCNC: 21 MMOL/L (ref 23–29)
CREAT SERPL-MCNC: 3.2 MG/DL (ref 0.5–1.4)
EST. GFR  (NO RACE VARIABLE): 21.2 ML/MIN/1.73 M^2
GLUCOSE SERPL-MCNC: 93 MG/DL (ref 70–110)
POTASSIUM SERPL-SCNC: 2.4 MMOL/L (ref 3.5–5.1)
SODIUM SERPL-SCNC: 142 MMOL/L (ref 136–145)

## 2022-11-04 PROCEDURE — 80048 BASIC METABOLIC PNL TOTAL CA: CPT | Performed by: INTERNAL MEDICINE

## 2022-11-04 PROCEDURE — 36415 COLL VENOUS BLD VENIPUNCTURE: CPT | Mod: PO | Performed by: INTERNAL MEDICINE

## 2022-11-05 NOTE — PROGRESS NOTES
Internal medicine note    Internal medicine physician on call for internal medicine      Around 8:05 p.m., received a call from a lab regarding critical value on the patient     It is noted that the lab was performed at 9:00 a.m.    Critical value potassium 2.4    it is also noted there was a bump in creatinine at 3.2      In review of his chart is noted that patient has a history of potassium disorder mainly hypokalemia as well as chronic kidney disease stage 4      Attempts were made to contact the person around 8:10 p.m. which was unsuccessful    Patient was contacted at 9:10 p.m., was made aware to situation          Since the abnormal lab was approximately 12 hours before and not occurring reading it was suggested recommended presented emergency room having repeated      Patient declined he stated that he went to emergency room before in which he had to wait 5 hours    He feels well, reports no chest pain palpitations shortness of breath    He has potassium chloride 20 mg tablets that he has had left over    It was recommended that he takes 2 tablets at night and 1 tablet a day for  The next few days      Will try to arrange for him to have a basic metabolic profile Monday at the [facility] November 7th    It was mention to him there is a risk with this approach not knowing with the potassium for the next 2 days

## 2022-11-07 ENCOUNTER — TELEPHONE (OUTPATIENT)
Dept: FAMILY MEDICINE | Facility: CLINIC | Age: 62
End: 2022-11-07
Payer: MEDICAID

## 2022-11-07 ENCOUNTER — LAB VISIT (OUTPATIENT)
Dept: LAB | Facility: HOSPITAL | Age: 62
End: 2022-11-07
Attending: INTERNAL MEDICINE
Payer: MEDICAID

## 2022-11-07 DIAGNOSIS — N18.4 CKD (CHRONIC KIDNEY DISEASE) STAGE 4, GFR 15-29 ML/MIN: ICD-10-CM

## 2022-11-07 DIAGNOSIS — E87.6 HYPOKALEMIA: Primary | ICD-10-CM

## 2022-11-07 DIAGNOSIS — E87.6 LOW SERUM POTASSIUM: ICD-10-CM

## 2022-11-07 LAB
ANION GAP SERPL CALC-SCNC: 10 MMOL/L (ref 8–16)
BUN SERPL-MCNC: 26 MG/DL (ref 8–23)
CALCIUM SERPL-MCNC: 8.8 MG/DL (ref 8.7–10.5)
CHLORIDE SERPL-SCNC: 106 MMOL/L (ref 95–110)
CO2 SERPL-SCNC: 22 MMOL/L (ref 23–29)
CREAT SERPL-MCNC: 2.9 MG/DL (ref 0.5–1.4)
EST. GFR  (NO RACE VARIABLE): 23.9 ML/MIN/1.73 M^2
GLUCOSE SERPL-MCNC: 99 MG/DL (ref 70–110)
MAGNESIUM SERPL-MCNC: 1.7 MG/DL (ref 1.6–2.6)
POTASSIUM SERPL-SCNC: 2.6 MMOL/L (ref 3.5–5.1)
SODIUM SERPL-SCNC: 138 MMOL/L (ref 136–145)

## 2022-11-07 PROCEDURE — 36415 COLL VENOUS BLD VENIPUNCTURE: CPT | Mod: PO | Performed by: INTERNAL MEDICINE

## 2022-11-07 PROCEDURE — 80048 BASIC METABOLIC PNL TOTAL CA: CPT | Performed by: INTERNAL MEDICINE

## 2022-11-07 PROCEDURE — 83735 ASSAY OF MAGNESIUM: CPT | Performed by: INTERNAL MEDICINE

## 2022-11-07 RX ORDER — POTASSIUM CHLORIDE 20 MEQ/1
40 TABLET, EXTENDED RELEASE ORAL 2 TIMES DAILY
Qty: 60 TABLET | Refills: 0 | Status: ON HOLD | OUTPATIENT
Start: 2022-11-07 | End: 2022-11-14 | Stop reason: SDUPTHER

## 2022-11-08 ENCOUNTER — PATIENT MESSAGE (OUTPATIENT)
Dept: INTERNAL MEDICINE | Facility: CLINIC | Age: 62
End: 2022-11-08
Payer: MEDICAID

## 2022-11-08 DIAGNOSIS — E87.6 HYPOKALEMIA: Primary | ICD-10-CM

## 2022-11-08 NOTE — TELEPHONE ENCOUNTER
Received on call page from lab patient's potassium from this morning (11/7) was 2.6     Patient has been taking potassium for several days - 20 mEq once daily - and last K+ was 2.4 on 11/4    He is completely asymptomatic at this time    Instructed patient to  new Rx for potassium supplements and to take two tablets (20 mEq) twice daily in the setting of chronic potassium wasting    Patient is also pending Neprhology evaluation discussed need to have appointment scheduled for his advanced CKD    Will notify PCP/care team to arrange appropriate follow-up

## 2022-11-12 ENCOUNTER — HOSPITAL ENCOUNTER (INPATIENT)
Facility: HOSPITAL | Age: 62
LOS: 2 days | Discharge: HOME | DRG: 291 | End: 2022-11-14
Attending: STUDENT IN AN ORGANIZED HEALTH CARE EDUCATION/TRAINING PROGRAM | Admitting: HOSPITALIST
Payer: MEDICAID

## 2022-11-12 DIAGNOSIS — D63.1 ANEMIA DUE TO STAGE 4 CHRONIC KIDNEY DISEASE: ICD-10-CM

## 2022-11-12 DIAGNOSIS — D64.9 SYMPTOMATIC ANEMIA: Primary | ICD-10-CM

## 2022-11-12 DIAGNOSIS — I48.91 ATRIAL FIBRILLATION WITH RVR: ICD-10-CM

## 2022-11-12 DIAGNOSIS — E87.6 HYPOKALEMIA: ICD-10-CM

## 2022-11-12 DIAGNOSIS — R07.9 CHEST PAIN: ICD-10-CM

## 2022-11-12 DIAGNOSIS — N18.4 ANEMIA DUE TO STAGE 4 CHRONIC KIDNEY DISEASE: ICD-10-CM

## 2022-11-12 DIAGNOSIS — I50.9 PLEURAL EFFUSION DUE TO CONGESTIVE HEART FAILURE: ICD-10-CM

## 2022-11-12 DIAGNOSIS — R06.00 DYSPNEA: ICD-10-CM

## 2022-11-12 PROBLEM — E55.9 VITAMIN D DEFICIENCY: Status: RESOLVED | Noted: 2020-10-03 | Resolved: 2022-11-12

## 2022-11-12 PROBLEM — I87.2 VENOUS INSUFFICIENCY OF BOTH LOWER EXTREMITIES: Status: RESOLVED | Noted: 2018-06-04 | Resolved: 2022-11-12

## 2022-11-12 PROBLEM — R09.02 HYPOXIA: Status: ACTIVE | Noted: 2020-01-30

## 2022-11-12 PROBLEM — N17.9 ACUTE RENAL FAILURE: Status: RESOLVED | Noted: 2019-10-04 | Resolved: 2022-11-12

## 2022-11-12 PROBLEM — I48.11 LONGSTANDING PERSISTENT ATRIAL FIBRILLATION: Status: ACTIVE | Noted: 2022-11-12

## 2022-11-12 PROBLEM — R60.0 LOCALIZED EDEMA: Status: RESOLVED | Noted: 2018-06-04 | Resolved: 2022-11-12

## 2022-11-12 PROBLEM — N18.30 HYPERTENSIVE KIDNEY DISEASE WITH STAGE 3 CHRONIC KIDNEY DISEASE: Status: RESOLVED | Noted: 2020-04-06 | Resolved: 2022-11-12

## 2022-11-12 PROBLEM — N18.32 ANEMIA IN STAGE 3B CHRONIC KIDNEY DISEASE: Status: RESOLVED | Noted: 2021-10-26 | Resolved: 2022-11-12

## 2022-11-12 PROBLEM — I12.9 HYPERTENSIVE KIDNEY DISEASE WITH STAGE 3 CHRONIC KIDNEY DISEASE: Status: RESOLVED | Noted: 2020-04-06 | Resolved: 2022-11-12

## 2022-11-12 PROBLEM — R94.39 ABNORMAL CARDIOVASCULAR STRESS TEST: Status: RESOLVED | Noted: 2019-02-27 | Resolved: 2022-11-12

## 2022-11-12 PROBLEM — E87.5 HYPERKALEMIA: Status: RESOLVED | Noted: 2022-02-01 | Resolved: 2022-11-12

## 2022-11-12 PROBLEM — I70.211 ATHEROSCLEROSIS OF NATIVE ARTERY OF RIGHT LOWER EXTREMITY WITH INTERMITTENT CLAUDICATION: Status: RESOLVED | Noted: 2018-04-13 | Resolved: 2022-11-12

## 2022-11-12 PROBLEM — H25.12 NUCLEAR SCLEROTIC CATARACT OF LEFT EYE: Status: RESOLVED | Noted: 2020-07-02 | Resolved: 2022-11-12

## 2022-11-12 LAB
ABO + RH BLD: NORMAL
ALBUMIN SERPL BCP-MCNC: 2.9 G/DL (ref 3.5–5.2)
ALP SERPL-CCNC: 58 U/L (ref 55–135)
ALT SERPL W/O P-5'-P-CCNC: 15 U/L (ref 10–44)
ANION GAP SERPL CALC-SCNC: 14 MMOL/L (ref 8–16)
ANION GAP SERPL CALC-SCNC: 9 MMOL/L (ref 8–16)
AST SERPL-CCNC: 23 U/L (ref 10–40)
BASOPHILS # BLD AUTO: 0.05 K/UL (ref 0–0.2)
BASOPHILS NFR BLD: 0.7 % (ref 0–1.9)
BILIRUB SERPL-MCNC: 0.5 MG/DL (ref 0.1–1)
BLD GP AB SCN CELLS X3 SERPL QL: NORMAL
BLD PROD TYP BPU: NORMAL
BLOOD UNIT EXPIRATION DATE: NORMAL
BLOOD UNIT TYPE CODE: 600
BLOOD UNIT TYPE: NORMAL
BNP SERPL-MCNC: 339 PG/ML (ref 0–99)
BUN SERPL-MCNC: 32 MG/DL (ref 8–23)
BUN SERPL-MCNC: 35 MG/DL (ref 8–23)
CALCIUM SERPL-MCNC: 8.5 MG/DL (ref 8.7–10.5)
CALCIUM SERPL-MCNC: 9 MG/DL (ref 8.7–10.5)
CHLORIDE SERPL-SCNC: 108 MMOL/L (ref 95–110)
CHLORIDE SERPL-SCNC: 110 MMOL/L (ref 95–110)
CO2 SERPL-SCNC: 13 MMOL/L (ref 23–29)
CO2 SERPL-SCNC: 21 MMOL/L (ref 23–29)
CODING SYSTEM: NORMAL
CREAT SERPL-MCNC: 2.5 MG/DL (ref 0.5–1.4)
CREAT SERPL-MCNC: 2.7 MG/DL (ref 0.5–1.4)
DIFFERENTIAL METHOD: ABNORMAL
DISPENSE STATUS: NORMAL
EOSINOPHIL # BLD AUTO: 0.2 K/UL (ref 0–0.5)
EOSINOPHIL NFR BLD: 3.4 % (ref 0–8)
ERYTHROCYTE [DISTWIDTH] IN BLOOD BY AUTOMATED COUNT: 13.3 % (ref 11.5–14.5)
ERYTHROCYTE [DISTWIDTH] IN BLOOD BY AUTOMATED COUNT: 13.3 % (ref 11.5–14.5)
EST. GFR  (NO RACE VARIABLE): 26 ML/MIN/1.73 M^2
EST. GFR  (NO RACE VARIABLE): 29 ML/MIN/1.73 M^2
GLUCOSE SERPL-MCNC: 105 MG/DL (ref 70–110)
GLUCOSE SERPL-MCNC: 123 MG/DL (ref 70–110)
HCT VFR BLD AUTO: 19.4 % (ref 40–54)
HCT VFR BLD AUTO: 22.1 % (ref 40–54)
HGB BLD-MCNC: 6.4 G/DL (ref 14–18)
HGB BLD-MCNC: 7.4 G/DL (ref 14–18)
IMM GRANULOCYTES # BLD AUTO: 0.02 K/UL (ref 0–0.04)
IMM GRANULOCYTES NFR BLD AUTO: 0.3 % (ref 0–0.5)
LACTATE SERPL-SCNC: 0.6 MMOL/L (ref 0.5–2.2)
LYMPHOCYTES # BLD AUTO: 1.1 K/UL (ref 1–4.8)
LYMPHOCYTES NFR BLD: 15.8 % (ref 18–48)
MAGNESIUM SERPL-MCNC: 1.7 MG/DL (ref 1.6–2.6)
MAGNESIUM SERPL-MCNC: 1.8 MG/DL (ref 1.6–2.6)
MCH RBC QN AUTO: 27.8 PG (ref 27–31)
MCH RBC QN AUTO: 28.1 PG (ref 27–31)
MCHC RBC AUTO-ENTMCNC: 33 G/DL (ref 32–36)
MCHC RBC AUTO-ENTMCNC: 33.5 G/DL (ref 32–36)
MCV RBC AUTO: 84 FL (ref 82–98)
MCV RBC AUTO: 84 FL (ref 82–98)
MONOCYTES # BLD AUTO: 0.5 K/UL (ref 0.3–1)
MONOCYTES NFR BLD: 7.2 % (ref 4–15)
NEUTROPHILS # BLD AUTO: 5.1 K/UL (ref 1.8–7.7)
NEUTROPHILS NFR BLD: 72.6 % (ref 38–73)
NRBC BLD-RTO: 0 /100 WBC
NUM UNITS TRANS PACKED RBC: NORMAL
PLATELET # BLD AUTO: 148 K/UL (ref 150–450)
PLATELET # BLD AUTO: 153 K/UL (ref 150–450)
PMV BLD AUTO: 10.1 FL (ref 9.2–12.9)
PMV BLD AUTO: 9.7 FL (ref 9.2–12.9)
POTASSIUM SERPL-SCNC: 3.2 MMOL/L (ref 3.5–5.1)
POTASSIUM SERPL-SCNC: 3.4 MMOL/L (ref 3.5–5.1)
PROT SERPL-MCNC: 6.2 G/DL (ref 6–8.4)
RBC # BLD AUTO: 2.3 M/UL (ref 4.6–6.2)
RBC # BLD AUTO: 2.63 M/UL (ref 4.6–6.2)
SODIUM SERPL-SCNC: 137 MMOL/L (ref 136–145)
SODIUM SERPL-SCNC: 138 MMOL/L (ref 136–145)
TROPONIN I SERPL DL<=0.01 NG/ML-MCNC: 0.01 NG/ML (ref 0–0.03)
TROPONIN I SERPL DL<=0.01 NG/ML-MCNC: 0.01 NG/ML (ref 0–0.03)
WBC # BLD AUTO: 6.25 K/UL (ref 3.9–12.7)
WBC # BLD AUTO: 7.07 K/UL (ref 3.9–12.7)

## 2022-11-12 PROCEDURE — 25000003 PHARM REV CODE 250: Performed by: NURSE PRACTITIONER

## 2022-11-12 PROCEDURE — 83735 ASSAY OF MAGNESIUM: CPT | Performed by: STUDENT IN AN ORGANIZED HEALTH CARE EDUCATION/TRAINING PROGRAM

## 2022-11-12 PROCEDURE — A4216 STERILE WATER/SALINE, 10 ML: HCPCS | Performed by: INTERNAL MEDICINE

## 2022-11-12 PROCEDURE — 87040 BLOOD CULTURE FOR BACTERIA: CPT | Performed by: STUDENT IN AN ORGANIZED HEALTH CARE EDUCATION/TRAINING PROGRAM

## 2022-11-12 PROCEDURE — 99900035 HC TECH TIME PER 15 MIN (STAT)

## 2022-11-12 PROCEDURE — G0378 HOSPITAL OBSERVATION PER HR: HCPCS

## 2022-11-12 PROCEDURE — 25000003 PHARM REV CODE 250: Performed by: INTERNAL MEDICINE

## 2022-11-12 PROCEDURE — 96374 THER/PROPH/DIAG INJ IV PUSH: CPT

## 2022-11-12 PROCEDURE — 80048 BASIC METABOLIC PNL TOTAL CA: CPT | Mod: XB | Performed by: INTERNAL MEDICINE

## 2022-11-12 PROCEDURE — 86901 BLOOD TYPING SEROLOGIC RH(D): CPT | Performed by: STUDENT IN AN ORGANIZED HEALTH CARE EDUCATION/TRAINING PROGRAM

## 2022-11-12 PROCEDURE — 93010 EKG 12-LEAD: ICD-10-PCS | Mod: ,,, | Performed by: INTERNAL MEDICINE

## 2022-11-12 PROCEDURE — 86920 COMPATIBILITY TEST SPIN: CPT | Performed by: INTERNAL MEDICINE

## 2022-11-12 PROCEDURE — 99285 EMERGENCY DEPT VISIT HI MDM: CPT | Mod: 25

## 2022-11-12 PROCEDURE — 93010 ELECTROCARDIOGRAM REPORT: CPT | Mod: ,,, | Performed by: INTERNAL MEDICINE

## 2022-11-12 PROCEDURE — 84484 ASSAY OF TROPONIN QUANT: CPT | Performed by: STUDENT IN AN ORGANIZED HEALTH CARE EDUCATION/TRAINING PROGRAM

## 2022-11-12 PROCEDURE — P9016 RBC LEUKOCYTES REDUCED: HCPCS | Performed by: INTERNAL MEDICINE

## 2022-11-12 PROCEDURE — 36430 TRANSFUSION BLD/BLD COMPNT: CPT

## 2022-11-12 PROCEDURE — 94640 AIRWAY INHALATION TREATMENT: CPT | Mod: XB

## 2022-11-12 PROCEDURE — 93005 ELECTROCARDIOGRAM TRACING: CPT

## 2022-11-12 PROCEDURE — 83880 ASSAY OF NATRIURETIC PEPTIDE: CPT | Performed by: STUDENT IN AN ORGANIZED HEALTH CARE EDUCATION/TRAINING PROGRAM

## 2022-11-12 PROCEDURE — 85025 COMPLETE CBC W/AUTO DIFF WBC: CPT | Performed by: STUDENT IN AN ORGANIZED HEALTH CARE EDUCATION/TRAINING PROGRAM

## 2022-11-12 PROCEDURE — 80053 COMPREHEN METABOLIC PANEL: CPT | Performed by: STUDENT IN AN ORGANIZED HEALTH CARE EDUCATION/TRAINING PROGRAM

## 2022-11-12 PROCEDURE — 63600175 PHARM REV CODE 636 W HCPCS: Performed by: STUDENT IN AN ORGANIZED HEALTH CARE EDUCATION/TRAINING PROGRAM

## 2022-11-12 PROCEDURE — 94640 AIRWAY INHALATION TREATMENT: CPT

## 2022-11-12 PROCEDURE — 63600175 PHARM REV CODE 636 W HCPCS: Performed by: NURSE PRACTITIONER

## 2022-11-12 PROCEDURE — 83605 ASSAY OF LACTIC ACID: CPT | Performed by: STUDENT IN AN ORGANIZED HEALTH CARE EDUCATION/TRAINING PROGRAM

## 2022-11-12 PROCEDURE — 83735 ASSAY OF MAGNESIUM: CPT | Mod: 91 | Performed by: INTERNAL MEDICINE

## 2022-11-12 PROCEDURE — 27000221 HC OXYGEN, UP TO 24 HOURS

## 2022-11-12 PROCEDURE — 25000242 PHARM REV CODE 250 ALT 637 W/ HCPCS: Performed by: INTERNAL MEDICINE

## 2022-11-12 PROCEDURE — 85027 COMPLETE CBC AUTOMATED: CPT | Performed by: INTERNAL MEDICINE

## 2022-11-12 PROCEDURE — 63600175 PHARM REV CODE 636 W HCPCS: Performed by: INTERNAL MEDICINE

## 2022-11-12 PROCEDURE — 36415 COLL VENOUS BLD VENIPUNCTURE: CPT | Performed by: INTERNAL MEDICINE

## 2022-11-12 PROCEDURE — 11000001 HC ACUTE MED/SURG PRIVATE ROOM

## 2022-11-12 RX ORDER — IBUPROFEN 200 MG
24 TABLET ORAL
Status: DISCONTINUED | OUTPATIENT
Start: 2022-11-12 | End: 2022-11-14 | Stop reason: HOSPADM

## 2022-11-12 RX ORDER — IBUPROFEN 200 MG
16 TABLET ORAL
Status: DISCONTINUED | OUTPATIENT
Start: 2022-11-12 | End: 2022-11-14 | Stop reason: HOSPADM

## 2022-11-12 RX ORDER — IPRATROPIUM BROMIDE AND ALBUTEROL SULFATE 2.5; .5 MG/3ML; MG/3ML
3 SOLUTION RESPIRATORY (INHALATION) EVERY 6 HOURS
Status: DISCONTINUED | OUTPATIENT
Start: 2022-11-12 | End: 2022-11-14 | Stop reason: HOSPADM

## 2022-11-12 RX ORDER — ACETAMINOPHEN 325 MG/1
650 TABLET ORAL EVERY 4 HOURS PRN
Status: DISCONTINUED | OUTPATIENT
Start: 2022-11-12 | End: 2022-11-14 | Stop reason: HOSPADM

## 2022-11-12 RX ORDER — POTASSIUM CHLORIDE 20 MEQ/1
20 TABLET, EXTENDED RELEASE ORAL ONCE
Status: COMPLETED | OUTPATIENT
Start: 2022-11-12 | End: 2022-11-12

## 2022-11-12 RX ORDER — NALOXONE HCL 0.4 MG/ML
0.02 VIAL (ML) INJECTION
Status: DISCONTINUED | OUTPATIENT
Start: 2022-11-12 | End: 2022-11-14 | Stop reason: HOSPADM

## 2022-11-12 RX ORDER — GLUCAGON 1 MG
1 KIT INJECTION
Status: DISCONTINUED | OUTPATIENT
Start: 2022-11-12 | End: 2022-11-14 | Stop reason: HOSPADM

## 2022-11-12 RX ORDER — ONDANSETRON 8 MG/1
8 TABLET, ORALLY DISINTEGRATING ORAL EVERY 8 HOURS PRN
Status: DISCONTINUED | OUTPATIENT
Start: 2022-11-12 | End: 2022-11-14 | Stop reason: HOSPADM

## 2022-11-12 RX ORDER — HYDROCODONE BITARTRATE AND ACETAMINOPHEN 500; 5 MG/1; MG/1
TABLET ORAL
Status: DISCONTINUED | OUTPATIENT
Start: 2022-11-12 | End: 2022-11-14 | Stop reason: HOSPADM

## 2022-11-12 RX ORDER — FUROSEMIDE 10 MG/ML
40 INJECTION INTRAMUSCULAR; INTRAVENOUS
Status: COMPLETED | OUTPATIENT
Start: 2022-11-12 | End: 2022-11-12

## 2022-11-12 RX ORDER — ASPIRIN 81 MG/1
81 TABLET ORAL DAILY
Status: DISCONTINUED | OUTPATIENT
Start: 2022-11-12 | End: 2022-11-14 | Stop reason: HOSPADM

## 2022-11-12 RX ORDER — FUROSEMIDE 10 MG/ML
80 INJECTION INTRAMUSCULAR; INTRAVENOUS ONCE
Status: COMPLETED | OUTPATIENT
Start: 2022-11-12 | End: 2022-11-12

## 2022-11-12 RX ORDER — SODIUM CHLORIDE 0.9 % (FLUSH) 0.9 %
10 SYRINGE (ML) INJECTION EVERY 12 HOURS PRN
Status: DISCONTINUED | OUTPATIENT
Start: 2022-11-12 | End: 2022-11-14 | Stop reason: HOSPADM

## 2022-11-12 RX ORDER — CLOPIDOGREL BISULFATE 75 MG/1
75 TABLET ORAL DAILY
Status: DISCONTINUED | OUTPATIENT
Start: 2022-11-12 | End: 2022-11-14 | Stop reason: HOSPADM

## 2022-11-12 RX ORDER — SPIRONOLACTONE 25 MG/1
50 TABLET ORAL DAILY
Status: DISCONTINUED | OUTPATIENT
Start: 2022-11-12 | End: 2022-11-14 | Stop reason: HOSPADM

## 2022-11-12 RX ORDER — POTASSIUM CHLORIDE 20 MEQ/1
20 TABLET, EXTENDED RELEASE ORAL DAILY
Status: DISCONTINUED | OUTPATIENT
Start: 2022-11-12 | End: 2022-11-13

## 2022-11-12 RX ORDER — TALC
6 POWDER (GRAM) TOPICAL NIGHTLY PRN
Status: DISCONTINUED | OUTPATIENT
Start: 2022-11-12 | End: 2022-11-14 | Stop reason: HOSPADM

## 2022-11-12 RX ORDER — FUROSEMIDE 10 MG/ML
40 INJECTION INTRAMUSCULAR; INTRAVENOUS DAILY
Status: DISCONTINUED | OUTPATIENT
Start: 2022-11-12 | End: 2022-11-14

## 2022-11-12 RX ORDER — CARVEDILOL 25 MG/1
25 TABLET ORAL 2 TIMES DAILY WITH MEALS
Status: DISCONTINUED | OUTPATIENT
Start: 2022-11-12 | End: 2022-11-14 | Stop reason: HOSPADM

## 2022-11-12 RX ORDER — ASPIRIN 325 MG
325 TABLET ORAL
Status: ACTIVE | OUTPATIENT
Start: 2022-11-12 | End: 2022-11-12

## 2022-11-12 RX ORDER — ALBUTEROL SULFATE 90 UG/1
2 AEROSOL, METERED RESPIRATORY (INHALATION) EVERY 6 HOURS PRN
Status: DISCONTINUED | OUTPATIENT
Start: 2022-11-12 | End: 2022-11-14 | Stop reason: HOSPADM

## 2022-11-12 RX ORDER — ATORVASTATIN CALCIUM 40 MG/1
40 TABLET, FILM COATED ORAL DAILY
Status: DISCONTINUED | OUTPATIENT
Start: 2022-11-12 | End: 2022-11-14 | Stop reason: HOSPADM

## 2022-11-12 RX ADMIN — CLOPIDOGREL BISULFATE 75 MG: 75 TABLET ORAL at 08:11

## 2022-11-12 RX ADMIN — FUROSEMIDE 40 MG: 10 INJECTION, SOLUTION INTRAMUSCULAR; INTRAVENOUS at 02:11

## 2022-11-12 RX ADMIN — IPRATROPIUM BROMIDE AND ALBUTEROL SULFATE 3 ML: .5; 3 SOLUTION RESPIRATORY (INHALATION) at 12:11

## 2022-11-12 RX ADMIN — ASPIRIN 81 MG: 81 TABLET, COATED ORAL at 08:11

## 2022-11-12 RX ADMIN — POTASSIUM CHLORIDE 20 MEQ: 1500 TABLET, EXTENDED RELEASE ORAL at 02:11

## 2022-11-12 RX ADMIN — POTASSIUM BICARBONATE 50 MEQ: 978 TABLET, EFFERVESCENT ORAL at 04:11

## 2022-11-12 RX ADMIN — Medication 10 ML: at 05:11

## 2022-11-12 RX ADMIN — POTASSIUM CHLORIDE 20 MEQ: 1500 TABLET, EXTENDED RELEASE ORAL at 11:11

## 2022-11-12 RX ADMIN — CARVEDILOL 25 MG: 25 TABLET, FILM COATED ORAL at 04:11

## 2022-11-12 RX ADMIN — FUROSEMIDE 80 MG: 10 INJECTION, SOLUTION INTRAVENOUS at 04:11

## 2022-11-12 RX ADMIN — ATORVASTATIN CALCIUM 40 MG: 40 TABLET, FILM COATED ORAL at 08:11

## 2022-11-12 RX ADMIN — IPRATROPIUM BROMIDE AND ALBUTEROL SULFATE 3 ML: .5; 3 SOLUTION RESPIRATORY (INHALATION) at 07:11

## 2022-11-12 RX ADMIN — IPRATROPIUM BROMIDE AND ALBUTEROL SULFATE 3 ML: .5; 3 SOLUTION RESPIRATORY (INHALATION) at 06:11

## 2022-11-12 RX ADMIN — CARVEDILOL 25 MG: 25 TABLET, FILM COATED ORAL at 08:11

## 2022-11-12 RX ADMIN — SPIRONOLACTONE 50 MG: 25 TABLET, FILM COATED ORAL at 08:11

## 2022-11-12 RX ADMIN — FUROSEMIDE 40 MG: 10 INJECTION, SOLUTION INTRAVENOUS at 08:11

## 2022-11-12 NOTE — Clinical Note
Diagnosis: Chest pain [066868]   Future Attending Provider: SANDRA SORIANO [[id number]]   Admitting Provider:: SANDRA SORIANO [[id number]]

## 2022-11-12 NOTE — H&P
[facility] Medicine  History & Physical    Patient Name: Domingo Gross  MRN: [id number]  Patient Class: OP- Observation  Admission Date: 11/12/2022  Attending Physician: Pawan Garcia, *   Primary Care Provider: Analy Encarnacion MD         Patient information was obtained from patient, spouse/SO, past medical records and ER records.     Subjective:     Principal Problem:Anemia due to stage 4 chronic kidney disease    Chief Complaint:   Chief Complaint   Patient presents with    Chest Pain     Patient presents to the ED via  EMS with reports of having chest pain and shortness of breath that started at approximately 1930 this evening.         HPI: 61 yo male with a PMHX of CAD (PCI Lcx and RCA), severe PAD s/p stents, CKD Stage 4 with chronic anemia, COPD, persistent Afib not on AC here for stabbing chest pain and SOB.    Patient is a poor historian. Difficult to obtain full story. It appears he started having substernal chest pain this afternoon that has been constant and stabbing, unrelated to exercise. He does have chest pain at baseline occasionally (angina?) but this is slightly different. Also noticed worsening SOB as of today, although he sounds NYHA IIIA at baseline. Denies any worsening orthopnea (sleeps flat), PND, palpitations, syncope, dizziness, melena, hematochezia, hematemesis. Does have generalized weakness and noted worsening leg edema.    In the ED, patient was noted to be Afib 80s with PVCs, SBP 100s. Labs with worsening Hgb to 6.4 from baseline 8-9 and sCr at baseline of 2.5.  with negative troponin. He was given IV Lasix 40mg x 1 with surprisingly OK UOP for now. 1U pRBC ordered and starting being transfused. Admitted to inpatient for further management.      Past Medical History:   Diagnosis Date    Allergy     Anemia     Anticoagulant long-term use     Arthritis     CKD (chronic kidney disease) stage 4, GFR 15-29 ml/min     COPD (chronic  obstructive pulmonary disease) 8/20/2021    Coronary artery disease     Heart attack 10/04/2019    Hematuria     Hemothorax     Hyperlipidemia     Hypertension     Hyperuricemia     Hypocalcemia     Hypokalemia     Hypophosphatemia     PAD (peripheral artery disease)     Proteinuria     Vitamin D deficiency        Past Surgical History:   Procedure Laterality Date    ABDOMINAL AORTOGRAPHY N/A 5/10/2019    Procedure: AORTOGRAM-ABDOMINAL;  Surgeon: Keo Jenkins MD;  Location: [facility] CATH LAB/EP;  Service: Cardiology;  Laterality: N/A;  RSFA intervention     AORTOGRAPHY WITH SERIALOGRAPHY N/A 12/3/2021    Procedure: AORTOGRAM, WITH SERIALOGRAPHY;  Surgeon: Keo Jenkins MD;  Location: [facility] CATH LAB/EP;  Service: Cardiology;  Laterality: N/A;    AORTOGRAPHY WITH SERIALOGRAPHY N/A 4/1/2022    Procedure: AORTOGRAM, WITH SERIALOGRAPHY;  Surgeon: Keo Jenkins MD;  Location: [facility] CATH LAB/EP;  Service: Cardiology;  Laterality: N/A;    BONE MARROW BIOPSY Right 10/12/2021    Procedure: BIOPSY-BONE MARROW;  Surgeon: Naseem Woods MD;  Location: [facility] OR;  Service: Oncology;  Laterality: Right;    CARDIAC CATHETERIZATION      CATARACT EXTRACTION      CATARACT EXTRACTION W/  INTRAOCULAR LENS IMPLANT Right 6/8/2020    Procedure: EXTRACTION, CATARACT, WITH IOL INSERTION;  Surgeon: Tin Light MD;  Location: [facility] OR;  Service: Ophthalmology;  Laterality: Right;    CATARACT EXTRACTION W/  INTRAOCULAR LENS IMPLANT Left 7/2/2020    Procedure: EXTRACTION, CATARACT, WITH IOL INSERTION;  Surgeon: Tin Light MD;  Location: [facility] OR;  Service: Ophthalmology;  Laterality: Left;    COLONOSCOPY N/A 1/6/2022    Procedure: COLONOSCOPY;  Surgeon: Kathe Penaloza MD;  Location: [facility] ENDO (2ND FLR);  Service: Endoscopy;  Laterality: N/A;  COVID test at [facility] on 1/3-GT  okay to hold Plavix for 5 days and aspirin per Dr. Becerra  2nd floor due toextensive cardiac history   instructions mailed and my [facility] - sm    CORONARY ANGIOGRAPHY N/A 3/29/2019    Procedure: ANGIOGRAM, CORONARY ARTERY;  Surgeon: Keo Jenkins MD;  Location: [facility] CATH LAB/EP;  Service: Cardiology;  Laterality: N/A;    CORONARY ANGIOGRAPHY Left 10/11/2019    Procedure: ANGIOGRAM, CORONARY ARTERY;  Surgeon: Keo Jenkins MD;  Location: [facility] CATH LAB/EP;  Service: Cardiology;  Laterality: Left;    CORONARY ANGIOPLASTY WITH STENT PLACEMENT  03/29/2019    mid and distal RCA    ESOPHAGOGASTRODUODENOSCOPY N/A 1/6/2022    Procedure: EGD (ESOPHAGOGASTRODUODENOSCOPY);  Surgeon: Kathe Penaloza MD;  Location: [address]);  Service: Endoscopy;  Laterality: N/A;    EYE SURGERY      INTRAVASCULAR ULTRASOUND, NON-CORONARY  8/12/2022    Procedure: Intravascular Ultrasound, Non-Coronary;  Surgeon: Keo Jenkins MD;  Location: [facility] CATH LAB/EP;  Service: Cardiology;;    LEFT HEART CATHETERIZATION N/A 3/29/2019    Procedure: Left heart cath;  Surgeon: Keo Jenkins MD;  Location: [facility] CATH LAB/EP;  Service: Cardiology;  Laterality: N/A;    LEFT HEART CATHETERIZATION Left 10/8/2019    Procedure: Left heart cath;  Surgeon: Keo Jenkins MD;  Location: [facility] CATH LAB/EP;  Service: Cardiology;  Laterality: Left;    PERCUTANEOUS TRANSLUMINAL ANGIOPLASTY (PTA) OF PERIPHERAL VESSEL N/A 7/12/2019    Procedure: PTA, PERIPHERAL VESSEL;  Surgeon: Keo Jenkins MD;  Location: [facility] CATH LAB/EP;  Service: Cardiology;  Laterality: N/A;    PERCUTANEOUS TRANSLUMINAL ANGIOPLASTY (PTA) OF PERIPHERAL VESSEL Left 5/20/2022    Procedure: PTA, PERIPHERAL VESSEL;  Surgeon: Keo Jenkins MD;  Location: [facility] CATH LAB/EP;  Service: Cardiology;  Laterality: Left;    PERCUTANEOUS TRANSLUMINAL ANGIOPLASTY (PTA) OF PERIPHERAL VESSEL Right 8/12/2022    Procedure: PTA, PERIPHERAL VESSEL;  Surgeon: Keo Jenkins MD;  Location: [facility] CATH LAB/EP;  Service: Cardiology;  Laterality: Right;       Review of patient's allergies indicates:   Allergen Reactions    Ace  inhibitors Rash       No current facility-administered medications on file prior to encounter.     Current Outpatient Medications on File Prior to Encounter   Medication Sig    albuterol (VENTOLIN HFA) 90 mcg/actuation inhaler Inhale 2 puffs into the lungs every 6 (six) hours as needed for Wheezing. Rescue    ASPIRIN (ASPIR-81 ORAL) Take 81 mg by mouth once daily.    carvediloL (COREG) 25 MG tablet Take 2 tablets (50 mg total) by mouth 2 (two) times daily with meals.    clopidogreL (PLAVIX) 75 mg tablet Take 1 tablet (75 mg total) by mouth once daily.    coenzyme Q10 100 mg capsule Take 100 mg by mouth once daily.    doxazosin (CARDURA) 4 MG tablet TAKE 1 TABLET(4 MG) BY MOUTH EVERY EVENING (Patient taking differently: Take 4 mg by mouth every evening.)    eplerenone (INSPRA) 50 MG Tab Take 1 tablet (50 mg total) by mouth once daily.    gabapentin (NEURONTIN) 300 MG capsule Take 1 capsule (300 mg total) by mouth 3 (three) times daily as needed (back pain).    hydrALAZINE (APRESOLINE) 100 MG tablet Take 1 tablet (100 mg total) by mouth 2 (two) times a day.    mupirocin (BACTROBAN) 2 % ointment Apply topically 3 (three) times daily. (Patient not taking: Reported on 2022)    NIFEdipine (PROCARDIA-XL) 60 MG (OSM) 24 hr tablet Take 1 tablet (60 mg total) by mouth once daily.    potassium chloride SA (K-DUR,KLOR-CON) 20 MEQ tablet Take 2 tablets (40 mEq total) by mouth 2 (two) times daily.    rosuvastatin (CRESTOR) 40 MG Tab Take 1 tablet (40 mg total) by mouth every evening.     Family History       Problem Relation (Age of Onset)    Aneurysm Mother    Cancer Father    Diabetes Sister    Heart disease Mother    Hypertension Mother, Sister    No Known Problems Brother, Son          Tobacco Use    Smoking status: Former     Types: Cigarettes     Quit date: 1999     Years since quittin.5    Smokeless tobacco: Never   Substance and Sexual Activity    Alcohol use: No     Alcohol/week: 0.0  standard drinks    Drug use: No    Sexual activity: Yes     Partners: Female     Review of Systems   All other systems reviewed and are negative.  Objective:     Vital Signs (Most Recent):  Temp: 98.2 °F (36.8 °C) (11/12/22 0042)  Pulse: 81 (11/12/22 0222)  Resp: 18 (11/12/22 0222)  BP: 114/72 (11/12/22 0222)  SpO2: 98 % (11/12/22 0222) Vital Signs (24h Range):  Temp:  [98.2 °F (36.8 °C)] 98.2 °F (36.8 °C)  Pulse:  [81] 81  Resp:  [14-18] 18  SpO2:  [98 %-99 %] 98 %  BP: (109-114)/(55-72) 114/72     Weight: 81.6 kg (180 lb)  Body mass index is 25.83 kg/m².    Physical Exam  Vitals and nursing note reviewed.   Constitutional:       General: He is not in acute distress.     Appearance: He is ill-appearing.   HENT:      Head: Normocephalic and atraumatic.      Mouth/Throat:      Mouth: Mucous membranes are moist.   Eyes:      Extraocular Movements: Extraocular movements intact.      Pupils: Pupils are equal, round, and reactive to light.   Cardiovascular:      Rate and Rhythm: Normal rate. Rhythm irregular.      Pulses: Normal pulses.      Heart sounds: Normal heart sounds.   Pulmonary:      Effort: Pulmonary effort is normal.      Breath sounds: Rales present. No wheezing.   Abdominal:      General: Abdomen is flat. Bowel sounds are normal. There is no distension.      Palpations: Abdomen is soft.      Tenderness: There is no abdominal tenderness.   Musculoskeletal:      Right lower leg: Edema present.      Left lower leg: Edema present.   Skin:     General: Skin is warm.   Neurological:      General: No focal deficit present.      Mental Status: He is alert and oriented to person, place, and time. Mental status is at baseline.         CRANIAL NERVES     CN III, IV, VI   Pupils are equal, round, and reactive to light.     Significant Labs: All pertinent labs within the past 24 hours have been reviewed.    Significant Imaging: I have reviewed all pertinent imaging results/findings within the past 24  hours.    Assessment/Plan:     * Anemia due to stage 4 chronic kidney disease  - Hgb 6.4 from baseline 8-9; no signs of bleeding  - Likely 2/2 CKD Stage 4. Has required transfusions in the past and sees hematology for IV iron  - Start 1U pRBC; consents signed  - Outpatient hematology to eval restarting IV iron and +/- EPO  - Repeat CBC in AM  - Monitor for signs of bleeding  - Will continue DAPT has benefits > risks at this point in time      Hypokalemia  - Chronic issue despite being on MRA and having significant CKD  - Could be RTA?  - Will give 50 meq KCl x 1  - Repeat BMP in AM  - Telemetry for arrhythmia  - Will be careful with K replacement as risk factors for hyperkalemia      Hypoxia  - CXR with pulm edema and R pleural effusion  - 2/2 CKD/HFpEF  - Will repeat IV Lasix given blood transfusion starting  - Continue diuresis  - Titrate off oxygen as able  - Continuous pulse oximetry      Chest pain  - Constant and stabbing in nature. Troponin negative x 1  - Etiology less likely ACS. Could be due to anemia and pulm edema  - Repeat 2nd troponin, ECG PRN  - S/p IV Lasix  - Will repeat diuretic dosing  - Telemetry      Longstanding persistent atrial fibrillation  Patient with Long standing persistent (>12 months) atrial fibrillation which is controlled currently with Beta Blocker. Patient is currently in atrial fibrillation.[id number] Score: 1. HASBLED Score: high. Anticoagulation not indicated due to significant anemia. Has made joint decision with cardiology.        COPD (chronic obstructive pulmonary disease)  - duonebs q6h      CKD (chronic kidney disease) stage 4, GFR 15-29 ml/min  - Baseline sCr 2.5-3; at baseline now  - pRBC transfusion as above  - Avoid further nephrotoxic agents  - IV diuretics as above  - Recommend outpatient nephrology      Coronary artery disease involving native coronary artery of native heart without angina pectoris  - Continue ASA, plavix, lipitor  - Coreg 50mg BID  - Chest pain  management as above      Atherosclerosis of native artery of both lower extremities with intermittent claudication  - Continue DAPT, statin      Hyperlipidemia  - Lipitor 40mg in exchange for home crestor as not on formulary      HTN (hypertension)  - Continue coreg, eplerenone, and procardia        VTE Risk Mitigation (From admission, onward)         Ordered     IP VTE LOW RISK PATIENT  Once         11/12/22 0259     Place sequential compression device  Until discontinued         11/12/22 0259                   Valente Batista MD  Department of Hospital Medicine   [facility] - Emergency Dept

## 2022-11-12 NOTE — ASSESSMENT & PLAN NOTE
Patient with Long standing persistent (>12 months) atrial fibrillation which is controlled currently with Beta Blocker. Patient is currently in atrial fibrillation.[id number] Score: 1. HASBLED Score: high. Anticoagulation not indicated due to significant anemia. Has made joint decision with cardiology.

## 2022-11-12 NOTE — ED NOTES
Pt placed on cardiac monitor upon arrival. Atrial fib with multiple PVC. Pt complaint of midsternal chest pain starting 5 hours ago. Pt is wheezing bilaterally. Placed on 3 L NC.

## 2022-11-12 NOTE — CONSULTS
NEPHROLOGY CONSULT NOTE    HPI & INTERVAL HISTORY:    Past Medical History:   Diagnosis Date    Allergy     Anemia     Anticoagulant long-term use     Arthritis     CKD (chronic kidney disease) stage 4, GFR 15-29 ml/min     COPD (chronic obstructive pulmonary disease) 08/20/2021    Coronary artery disease     Heart attack 10/04/2019    Hematuria     Hemothorax     Hyperlipidemia     Hypertension     Hyperuricemia     Hypocalcemia     Hypokalemia     Hypophosphatemia     PAD (peripheral artery disease)     Proteinuria     Vitamin D deficiency       Past Surgical History:   Procedure Laterality Date    ABDOMINAL AORTOGRAPHY N/A 5/10/2019    Procedure: AORTOGRAM-ABDOMINAL;  Surgeon: Keo Jenkins MD;  Location: [facility] CATH LAB/EP;  Service: Cardiology;  Laterality: N/A;  RSFA intervention     AORTOGRAPHY WITH SERIALOGRAPHY N/A 12/3/2021    Procedure: AORTOGRAM, WITH SERIALOGRAPHY;  Surgeon: Keo Jenkins MD;  Location: [facility] CATH LAB/EP;  Service: Cardiology;  Laterality: N/A;    AORTOGRAPHY WITH SERIALOGRAPHY N/A 4/1/2022    Procedure: AORTOGRAM, WITH SERIALOGRAPHY;  Surgeon: Keo Jenkins MD;  Location: [facility] CATH LAB/EP;  Service: Cardiology;  Laterality: N/A;    BONE MARROW BIOPSY Right 10/12/2021    Procedure: BIOPSY-BONE MARROW;  Surgeon: Naseem Woods MD;  Location: [facility] OR;  Service: Oncology;  Laterality: Right;    CARDIAC CATHETERIZATION      CATARACT EXTRACTION      CATARACT EXTRACTION W/  INTRAOCULAR LENS IMPLANT Right 6/8/2020    Procedure: EXTRACTION, CATARACT, WITH IOL INSERTION;  Surgeon: Tin Light MD;  Location: [facility] [place] OR;  Service: Ophthalmology;  Laterality: Right;    CATARACT EXTRACTION W/  INTRAOCULAR LENS IMPLANT Left 7/2/2020    Procedure: EXTRACTION, CATARACT, WITH IOL INSERTION;  Surgeon: iTn Light MD;  Location: [facility] [place] OR;  Service: Ophthalmology;  Laterality: Left;    COLONOSCOPY N/A 1/6/2022    Procedure: COLONOSCOPY;  Surgeon: Kathe Penaloza MD;  Location: [facility] ENDO ([address] [place] [address]);   Service: Endoscopy;  Laterality: N/A;  COVID test at [facility] on 1/3-GT  okay to hold Plavix for 5 days and aspirin per Dr. Becerra  2nd floor due toextensive cardiac history   [facility]     CORONARY ANGIOGRAPHY N/A 3/29/2019    Procedure: ANGIOGRAM, CORONARY ARTERY;  Surgeon: Keo Jenkins MD;  Location: [facility] CATH LAB/EP;  Service: Cardiology;  Laterality: N/A;    CORONARY ANGIOGRAPHY Left 10/11/2019    Procedure: ANGIOGRAM, CORONARY ARTERY;  Surgeon: Keo Jenkins MD;  Location: [facility] CATH LAB/EP;  Service: Cardiology;  Laterality: Left;    CORONARY ANGIOPLASTY WITH STENT PLACEMENT  03/29/2019    mid and distal RCA    ESOPHAGOGASTRODUODENOSCOPY N/A 1/6/2022    Procedure: EGD (ESOPHAGOGASTRODUODENOSCOPY);  Surgeon: Kathe Penaloza MD;  Location: [address]);  Service: Endoscopy;  Laterality: N/A;    EYE SURGERY      INTRAVASCULAR ULTRASOUND, NON-CORONARY  8/12/2022    Procedure: Intravascular Ultrasound, Non-Coronary;  Surgeon: Keo Jenkins MD;  Location: [facility] CATH LAB/EP;  Service: Cardiology;;    LEFT HEART CATHETERIZATION N/A 3/29/2019    Procedure: Left heart cath;  Surgeon: Keo Jenkins MD;  Location: [facility] CATH LAB/EP;  Service: Cardiology;  Laterality: N/A;    LEFT HEART CATHETERIZATION Left 10/8/2019    Procedure: Left heart cath;  Surgeon: Keo Jenkins MD;  Location: [facility] CATH LAB/EP;  Service: Cardiology;  Laterality: Left;    PERCUTANEOUS TRANSLUMINAL ANGIOPLASTY (PTA) OF PERIPHERAL VESSEL N/A 7/12/2019    Procedure: PTA, PERIPHERAL VESSEL;  Surgeon: Keo Jenkins MD;  Location: [facility] CATH LAB/EP;  Service: Cardiology;  Laterality: N/A;    PERCUTANEOUS TRANSLUMINAL ANGIOPLASTY (PTA) OF PERIPHERAL VESSEL Left 5/20/2022    Procedure: PTA, PERIPHERAL VESSEL;  Surgeon: Keo Jenkins MD;  Location: [facility] CATH LAB/EP;  Service: Cardiology;  Laterality: Left;    PERCUTANEOUS TRANSLUMINAL ANGIOPLASTY (PTA) OF PERIPHERAL VESSEL Right 8/12/2022     Procedure: PTA, PERIPHERAL VESSEL;  Surgeon: Keo Jenkins MD;  Location: [facility] CATH LAB/EP;  Service: Cardiology;  Laterality: Right;      Review of patient's allergies indicates:   Allergen Reactions    Ace inhibitors Rash      Medications Prior to Admission   Medication Sig Dispense Refill Last Dose    albuterol (VENTOLIN HFA) 90 mcg/actuation inhaler Inhale 2 puffs into the lungs every 6 (six) hours as needed for Wheezing. Rescue 18 g 0 2022    ASPIRIN (ASPIR-81 ORAL) Take 81 mg by mouth once daily.   2022    carvediloL (COREG) 25 MG tablet Take 2 tablets (50 mg total) by mouth 2 (two) times daily with meals. 180 tablet 3 2022    clopidogreL (PLAVIX) 75 mg tablet Take 1 tablet (75 mg total) by mouth once daily. 30 tablet 11 2022    coenzyme Q10 100 mg capsule Take 100 mg by mouth once daily.   2022    eplerenone (INSPRA) 50 MG Tab Take 1 tablet (50 mg total) by mouth once daily. 90 tablet 3 2022    hydrALAZINE (APRESOLINE) 100 MG tablet Take 1 tablet (100 mg total) by mouth 2 (two) times a day. 180 tablet 3 2022    potassium chloride SA (K-DUR,KLOR-CON) 20 MEQ tablet Take 2 tablets (40 mEq total) by mouth 2 (two) times daily. 60 tablet 0 2022    rosuvastatin (CRESTOR) 40 MG Tab Take 1 tablet (40 mg total) by mouth every evening. 90 tablet 3 2022    mupirocin (BACTROBAN) 2 % ointment Apply topically 3 (three) times daily. (Patient not taking: Reported on 2022) 1 each 3        Social History     Socioeconomic History    Marital status:    Occupational History     Employer: [employer]   Tobacco Use    Smoking status: Former     Types: Cigarettes     Quit date: 1999     Years since quittin.5    Smokeless tobacco: Never   Substance and Sexual Activity    Alcohol use: No     Alcohol/week: 0.0 standard drinks    Drug use: No    Sexual activity: Yes     Partners: Female     Social Determinants of Health     Financial Resource Strain: Low Risk      Difficulty of Paying Living Expenses: Not hard at all   Food Insecurity: No Food Insecurity    Worried About Running Out of Food in the Last Year: Never true    Ran Out of Food in the Last Year: Never true   Transportation Needs: No Transportation Needs    Lack of Transportation (Medical): No    Lack of Transportation (Non-Medical): No   Physical Activity: Unknown    Days of Exercise per Week: Patient refused    Minutes of Exercise per Session: 0 min   Stress: No Stress Concern Present    Feeling of Stress : Not at all   Social Connections: Unknown    Frequency of Communication with Friends and Family: Three times a week    Frequency of Social Gatherings with Friends and Family: Never    Active Member of Clubs or Organizations: No    Attends Club or Organization Meetings: Never    Marital Status:    Housing Stability: Low Risk     Unable to Pay for Housing in the Last Year: No    Number of Places Lived in the Last Year: 1    Unstable Housing in the Last Year: No        MEDS   albuterol-ipratropium  3 mL Nebulization Q6H    aspirin  81 mg Oral Daily    atorvastatin  40 mg Oral Daily    carvediloL  25 mg Oral BID WM    clopidogreL  75 mg Oral Daily    furosemide (LASIX) injection  40 mg Intravenous Daily    potassium chloride  20 mEq Oral Daily    spironolactone  50 mg Oral Daily             CONTINOUS INFUSIONS:      Intake/Output Summary (Last 24 hours) at 11/12/2022 1315  Last data filed at 11/12/2022 0910  Gross per 24 hour   Intake 311.67 ml   Output 1900 ml   Net -1588.33 ml        HEMODYNAMICS:    Temp:  [96.2 °F (35.7 °C)-98.2 °F (36.8 °C)] 96.5 °F (35.8 °C)  Pulse:  [76-90] 84  Resp:  [14-21] 16  SpO2:  [94 %-99 %] 96 %  BP: (109-172)/(55-89) 138/75   General:   SOB  No CP  No cough  No fever  No chills  No nausea  No vomiting  No diarrhea  Good appetite  Good intake  Cardiology : pulse 84  Pulmonary : diminished breath sounds  Abdomen soft   Extremities : edema   Skin: dry   LABS   Lab Results    Component Value Date    WBC 6.25 11/12/2022    HGB 7.4 (L) 11/12/2022    HCT 22.1 (L) 11/12/2022    MCV 84 11/12/2022     (L) 11/12/2022        Recent Labs   Lab 11/12/22  0112 11/12/22  0824   * 105   CALCIUM 8.5* 9.0   ALBUMIN 2.9*  --    PROT 6.2  --     138   K 3.2* 3.4*   CO2 13* 21*    108   BUN 32* 35*   CREATININE 2.5* 2.7*   ALKPHOS 58  --    ALT 15  --    AST 23  --    BILITOT 0.5  --       Lab Results   Component Value Date    PTH 60.6 08/11/2021    CALCIUM 9.0 11/12/2022    CAION 1.31 07/14/2020    PHOS 4.0 02/03/2022      Lab Results   Component Value Date    IRON 63 09/14/2022    TIBC 349 09/14/2022    FERRITIN 253 07/25/2022        ABG  No results for input(s): PH, PO2, PCO2, HCO3, BE in the last 168 hours.      IMAGING:  CXR    ASSESSMENT / PLAN  BERTT/ CKD 4  CAD  HTN  PVD  UO 1000 + 900 cc  UA protein 3+  Nephrotic range proteinuria (2020) 2021 - no monoclonal peaks  UA protein/creatinine 2.21 in 2022  Kidney biopsy was not done in 2020 ( patient was  on plavix, aspirin)  US 2020  The right kidney measures 11.8 cm. The left kidney measures 11.7 cm.  There is equivocal mild increased echogenicity of the renal parenchyma bilaterally.  No solid or cystic masses. No hydronephrosis.The bladder is unremarkable in appearance.  Right kidney RI 0.70.  Left kidney RI 0.71.  Splenic RI of 0.58.  Creatinine 2.7  GFR 26 cc/ min  BUN 35  Hypokalemia 3.4  Replace prn  Metabolic acidosis  Metabolic bone disease  Anemia multifactorial  Hb 7.4  Poor nutrition  Albumin 2.9  Blood pressure 138/75  EF 2021 - 55 %  CXR-  Increased pleural effusion mainly on the right with worsening of mild interstitial prominence.  Correlate for developing edema.     Weight daily  I and O  Avoid hypotension, nephrotoxic agents, hypovolemia  Diuresis  Renal diet

## 2022-11-12 NOTE — HPI
63 yo male with a PMHX of CAD (PCI Lcx and RCA), severe PAD s/p stents, CKD Stage 4 with chronic anemia, COPD, persistent Afib not on AC here for stabbing chest pain and SOB.    Patient is a poor historian. Difficult to obtain full story. It appears he started having substernal chest pain this afternoon that has been constant and stabbing, unrelated to exercise. He does have chest pain at baseline occasionally (angina?) but this is slightly different. Also noticed worsening SOB as of today, although he sounds NYHA IIIA at baseline. Denies any worsening orthopnea (sleeps flat), PND, palpitations, syncope, dizziness, melena, hematochezia, hematemesis. Does have generalized weakness and noted worsening leg edema.    In the ED, patient was noted to be Afib 80s with PVCs, SBP 100s. Labs with worsening Hgb to 6.4 from baseline 8-9 and sCr at baseline of 2.5.  with negative troponin. He was given IV Lasix 40mg x 1 with surprisingly OK UOP for now. 1U pRBC ordered and starting being transfused. Admitted to inpatient for further management.

## 2022-11-12 NOTE — ASSESSMENT & PLAN NOTE
- CXR with pulm edema and R pleural effusion  - 2/2 CKD/HFpEF  - Will repeat IV Lasix given blood transfusion starting  - Continue diuresis  - Titrate off oxygen as able  - Continuous pulse oximetry

## 2022-11-12 NOTE — ASSESSMENT & PLAN NOTE
- Chronic issue despite being on MRA and having significant CKD  - Could be RTA?  - Will give 50 meq KCl x 1  - Repeat BMP in AM  - Telemetry for arrhythmia  - Will be careful with K replacement as risk factors for hyperkalemia

## 2022-11-12 NOTE — PLAN OF CARE
POC, chart, MAR and labs results reviewed.    Problem: Adult Inpatient Plan of Care  Goal: Plan of Care Review  Outcome: Ongoing, Progressing     Problem: COPD (Chronic Obstructive Pulmonary Disease) Comorbidity  Goal: Maintenance of COPD Symptom Control  Outcome: Ongoing, Progressing     Problem: Anemia  Goal: Anemia Symptom Improvement  Outcome: Ongoing, Progressing     Problem: Fall Injury Risk  Goal: Absence of Fall and Fall-Related Injury  Outcome: Ongoing, Progressing

## 2022-11-12 NOTE — PROGRESS NOTES
Care assumed, EMR reviewed. Patient re interviewed and examined.     He reports presenting with dyspnea and activity intolerance  Denies chest pain    Found on presentation to have bilateral pleural effusions, Rt > Lt    Trop normal  He is requiring nasal cannula oxygen to maintain adequate oxygenation, which is new  Diuresis initiated but carefully considering CKD  Echo pending  Cards consulted    Also found on presentation to have Hgb 6.4 (baseline 9.7-10.2)   Despite baseline as above, he has been dropping his Hgb since May 2022 with average Hgb from May to present (7.8-8.2)  He denies overt blood loss  He has known CKD which is likely etiology of anemia  MCV (84) & MCHC (33.5) normal  Ferritin in July 253  He received one unit PRBC with subsequent Hgb 7.4  Should likely start colony stimulating factors per renal known to him OP    Additionally, he was also found to have potassium of 2.6  Electrolytes repleted    Dyspnea is likely MF related to growing pleural effusion of unknown etiology +/- symptomatic anemia  Renal and cards consult pending      Recent Labs   Lab 11/12/22 0112 11/12/22  0824   WBC 7.07 6.25   HGB 6.4* 7.4*   HCT 19.4* 22.1*    148*   MONO 7.2  0.5  --      Recent Labs   Lab 11/07/22  0909 11/12/22  0112 11/12/22  0824    137 138   K 2.6* 3.2* 3.4*    110 108   CO2 22* 13* 21*   BUN 26* 32* 35*   CREATININE 2.9* 2.5* 2.7*   CALCIUM 8.8 8.5* 9.0   PROT  --  6.2  --    BILITOT  --  0.5  --    ALKPHOS  --  58  --    ALT  --  15  --    AST  --  23  --      Recent Labs   Lab 11/12/22  0519   TROPONINI 0.012     BNP  Recent Labs   Lab 11/12/22  0112   *     VTE ppx: KENDALL's/SCD's

## 2022-11-12 NOTE — SUBJECTIVE & OBJECTIVE
Past Medical History:   Diagnosis Date    Allergy     Anemia     Anticoagulant long-term use     Arthritis     CKD (chronic kidney disease) stage 4, GFR 15-29 ml/min     COPD (chronic obstructive pulmonary disease) 8/20/2021    Coronary artery disease     Heart attack 10/04/2019    Hematuria     Hemothorax     Hyperlipidemia     Hypertension     Hyperuricemia     Hypocalcemia     Hypokalemia     Hypophosphatemia     PAD (peripheral artery disease)     Proteinuria     Vitamin D deficiency        Past Surgical History:   Procedure Laterality Date    ABDOMINAL AORTOGRAPHY N/A 5/10/2019    Procedure: AORTOGRAM-ABDOMINAL;  Surgeon: Keo Jenkins MD;  Location: [facility] CATH LAB/EP;  Service: Cardiology;  Laterality: N/A;  RSFA intervention     AORTOGRAPHY WITH SERIALOGRAPHY N/A 12/3/2021    Procedure: AORTOGRAM, WITH SERIALOGRAPHY;  Surgeon: Keo Jenkins MD;  Location: [facility] CATH LAB/EP;  Service: Cardiology;  Laterality: N/A;    AORTOGRAPHY WITH SERIALOGRAPHY N/A 4/1/2022    Procedure: AORTOGRAM, WITH SERIALOGRAPHY;  Surgeon: Keo Jenkins MD;  Location: [facility] CATH LAB/EP;  Service: Cardiology;  Laterality: N/A;    BONE MARROW BIOPSY Right 10/12/2021    Procedure: BIOPSY-BONE MARROW;  Surgeon: Naseem Woods MD;  Location: [facility] OR;  Service: Oncology;  Laterality: Right;    CARDIAC CATHETERIZATION      CATARACT EXTRACTION      CATARACT EXTRACTION W/  INTRAOCULAR LENS IMPLANT Right 6/8/2020    Procedure: EXTRACTION, CATARACT, WITH IOL INSERTION;  Surgeon: Tin Light MD;  Location: [facility] OR;  Service: Ophthalmology;  Laterality: Right;    CATARACT EXTRACTION W/  INTRAOCULAR LENS IMPLANT Left 7/2/2020    Procedure: EXTRACTION, CATARACT, WITH IOL INSERTION;  Surgeon: Tin Light MD;  Location: [facility] OR;  Service: Ophthalmology;  Laterality: Left;    COLONOSCOPY N/A 1/6/2022    Procedure: COLONOSCOPY;  Surgeon: Kathe Penaloza MD;  Location: [facility] ENDO ([address] [place] [address]);  Service: Endoscopy;  Laterality: N/A;  COVID test at  [facility] on 1/3-GT  okay to hold Plavix for 5 days and aspirin per Dr. Becerra  2nd floor due toextensive cardiac history   instructions mailed and my [facility] portal -     CORONARY ANGIOGRAPHY N/A 3/29/2019    Procedure: ANGIOGRAM, CORONARY ARTERY;  Surgeon: Keo Jenknis MD;  Location: [facility] CATH LAB/EP;  Service: Cardiology;  Laterality: N/A;    CORONARY ANGIOGRAPHY Left 10/11/2019    Procedure: ANGIOGRAM, CORONARY ARTERY;  Surgeon: Keo Jenkins MD;  Location: [facility] CATH LAB/EP;  Service: Cardiology;  Laterality: Left;    CORONARY ANGIOPLASTY WITH STENT PLACEMENT  03/29/2019    mid and distal RCA    ESOPHAGOGASTRODUODENOSCOPY N/A 1/6/2022    Procedure: EGD (ESOPHAGOGASTRODUODENOSCOPY);  Surgeon: Kathe Penaloza MD;  [facility]  Service: Endoscopy;  Laterality: N/A;    EYE SURGERY      INTRAVASCULAR ULTRASOUND, NON-CORONARY  8/12/2022    Procedure: Intravascular Ultrasound, Non-Coronary;  Surgeon: Keo Jenkins MD;  Location: [facility] CATH LAB/EP;  Service: Cardiology;;    LEFT HEART CATHETERIZATION N/A 3/29/2019    Procedure: Left heart cath;  Surgeon: Keo Jenkins MD;  Location: [facility] CATH LAB/EP;  Service: Cardiology;  Laterality: N/A;    LEFT HEART CATHETERIZATION Left 10/8/2019    Procedure: Left heart cath;  Surgeon: Keo Jenkins MD;  Location: [facility] CATH LAB/EP;  Service: Cardiology;  Laterality: Left;    PERCUTANEOUS TRANSLUMINAL ANGIOPLASTY (PTA) OF PERIPHERAL VESSEL N/A 7/12/2019    Procedure: PTA, PERIPHERAL VESSEL;  Surgeon: Keo Jenkins MD;  Location: [facility] CATH LAB/EP;  Service: Cardiology;  Laterality: N/A;    PERCUTANEOUS TRANSLUMINAL ANGIOPLASTY (PTA) OF PERIPHERAL VESSEL Left 5/20/2022    Procedure: PTA, PERIPHERAL VESSEL;  Surgeon: Keo Jenkins MD;  Location: [facility] CATH LAB/EP;  Service: Cardiology;  Laterality: Left;    PERCUTANEOUS TRANSLUMINAL ANGIOPLASTY (PTA) OF PERIPHERAL VESSEL Right 8/12/2022    Procedure: PTA, PERIPHERAL VESSEL;  Surgeon: Keo Jenkins  MD;  Location: [facility] CATH LAB/EP;  Service: Cardiology;  Laterality: Right;       Review of patient's allergies indicates:   Allergen Reactions    Ace inhibitors Rash       No current facility-administered medications on file prior to encounter.     Current Outpatient Medications on File Prior to Encounter   Medication Sig    albuterol (VENTOLIN HFA) 90 mcg/actuation inhaler Inhale 2 puffs into the lungs every 6 (six) hours as needed for Wheezing. Rescue    ASPIRIN (ASPIR-81 ORAL) Take 81 mg by mouth once daily.    carvediloL (COREG) 25 MG tablet Take 2 tablets (50 mg total) by mouth 2 (two) times daily with meals.    clopidogreL (PLAVIX) 75 mg tablet Take 1 tablet (75 mg total) by mouth once daily.    coenzyme Q10 100 mg capsule Take 100 mg by mouth once daily.    doxazosin (CARDURA) 4 MG tablet TAKE 1 TABLET(4 MG) BY MOUTH EVERY EVENING (Patient taking differently: Take 4 mg by mouth every evening.)    eplerenone (INSPRA) 50 MG Tab Take 1 tablet (50 mg total) by mouth once daily.    gabapentin (NEURONTIN) 300 MG capsule Take 1 capsule (300 mg total) by mouth 3 (three) times daily as needed (back pain).    hydrALAZINE (APRESOLINE) 100 MG tablet Take 1 tablet (100 mg total) by mouth 2 (two) times a day.    mupirocin (BACTROBAN) 2 % ointment Apply topically 3 (three) times daily. (Patient not taking: Reported on 9/2/2022)    NIFEdipine (PROCARDIA-XL) 60 MG (OSM) 24 hr tablet Take 1 tablet (60 mg total) by mouth once daily.    potassium chloride SA (K-DUR,KLOR-CON) 20 MEQ tablet Take 2 tablets (40 mEq total) by mouth 2 (two) times daily.    rosuvastatin (CRESTOR) 40 MG Tab Take 1 tablet (40 mg total) by mouth every evening.     Family History       Problem Relation (Age of Onset)    Aneurysm Mother    Cancer Father    Diabetes Sister    Heart disease Mother    Hypertension Mother, Sister    No Known Problems Brother, Son          Tobacco Use    Smoking status: Former     Types: Cigarettes     Quit date: 4/21/1999      Years since quittin.5    Smokeless tobacco: Never   Substance and Sexual Activity    Alcohol use: No     Alcohol/week: 0.0 standard drinks    Drug use: No    Sexual activity: Yes     Partners: Female     Review of Systems   All other systems reviewed and are negative.  Objective:     Vital Signs (Most Recent):  Temp: 98.2 °F (36.8 °C) (22)  Pulse: 81 (22)  Resp: 18 (22)  BP: 114/72 (22)  SpO2: 98 % (22) Vital Signs (24h Range):  Temp:  [98.2 °F (36.8 °C)] 98.2 °F (36.8 °C)  Pulse:  [81] 81  Resp:  [14-18] 18  SpO2:  [98 %-99 %] 98 %  BP: (109-114)/(55-72) 114/72     Weight: 81.6 kg (180 lb)  Body mass index is 25.83 kg/m².    Physical Exam  Vitals and nursing note reviewed.   Constitutional:       General: He is not in acute distress.     Appearance: He is ill-appearing.   HENT:      Head: Normocephalic and atraumatic.      Mouth/Throat:      Mouth: Mucous membranes are moist.   Eyes:      Extraocular Movements: Extraocular movements intact.      Pupils: Pupils are equal, round, and reactive to light.   Cardiovascular:      Rate and Rhythm: Normal rate. Rhythm irregular.      Pulses: Normal pulses.      Heart sounds: Normal heart sounds.   Pulmonary:      Effort: Pulmonary effort is normal.      Breath sounds: Rales present. No wheezing.   Abdominal:      General: Abdomen is flat. Bowel sounds are normal. There is no distension.      Palpations: Abdomen is soft.      Tenderness: There is no abdominal tenderness.   Musculoskeletal:      Right lower leg: Edema present.      Left lower leg: Edema present.   Skin:     General: Skin is warm.   Neurological:      General: No focal deficit present.      Mental Status: He is alert and oriented to person, place, and time. Mental status is at baseline.         CRANIAL NERVES     CN III, IV, VI   Pupils are equal, round, and reactive to light.     Significant Labs: All pertinent labs within the past 24 hours have been  reviewed.    Significant Imaging: I have reviewed all pertinent imaging results/findings within the past 24 hours.

## 2022-11-12 NOTE — ASSESSMENT & PLAN NOTE
- Baseline sCr 2.5-3; at baseline now  - pRBC transfusion as above  - Avoid further nephrotoxic agents  - IV diuretics as above  - Recommend outpatient nephrology

## 2022-11-12 NOTE — PLAN OF CARE
Vn Note: Labs, notes, orders, care plan review will continue to monitor and be available.   Problem: Adult Inpatient Plan of Care  Goal: Plan of Care Review  Outcome: Ongoing, Progressing

## 2022-11-12 NOTE — ASSESSMENT & PLAN NOTE
- Hgb 6.4 from baseline 8-9; no signs of bleeding  - Likely 2/2 CKD Stage 4. Has required transfusions in the past and sees hematology for IV iron  - Start 1U pRBC; consents signed  - Outpatient hematology to eval restarting IV iron and +/- EPO  - Repeat CBC in AM  - Monitor for signs of bleeding  - Will continue DAPT has benefits > risks at this point in time

## 2022-11-12 NOTE — ASSESSMENT & PLAN NOTE
- Constant and stabbing in nature. Troponin negative x 1  - Etiology less likely ACS. Could be due to anemia and pulm edema  - Repeat 2nd troponin, ECG PRN  - S/p IV Lasix  - Will repeat diuretic dosing  - Telemetry

## 2022-11-12 NOTE — ED PROVIDER NOTES
Encounter Date: 11/12/2022       History     Chief Complaint   Patient presents with    Chest Pain     Patient presents to the ED via  EMS with reports of having chest pain and shortness of breath that started at approximately 1930 this evening.      This is a 62-year-old male history of peripheral arterial disease, CAD, he hypertension, mi CKD stage 4, hypokalemia.  Had outside lab work showing potassium of 2.4 drawn 4 days ago and declined presentation to the emergency department at that time.  Has been taking oral potassium to supplement this at home.  Has noted worsening swelling to the bilateral lower extremities.  Presented this evening for evaluation due to acute onset of substernal nonradiating chest pain along with shortness of breath.    The history is provided by the patient, medical records and the spouse.   Chest Pain  The current episode started 3 to 5 hours ago. Chest pain occurs constantly. The chest pain is unchanged. The quality of the pain is described as dull. The pain does not radiate. Primary symptoms include shortness of breath and wheezing. Pertinent negatives for primary symptoms include no fever, no syncope, no cough, no abdominal pain, no nausea, no vomiting and no altered mental status.   Pertinent negatives for associated symptoms include no numbness and no weakness.   His past medical history is significant for COPD.   Review of patient's allergies indicates:   Allergen Reactions    Ace inhibitors Rash     Past Medical History:   Diagnosis Date    Allergy     Anemia     Anticoagulant long-term use     Arthritis     CKD (chronic kidney disease) stage 4, GFR 15-29 ml/min     COPD (chronic obstructive pulmonary disease) 8/20/2021    Coronary artery disease     Heart attack 10/04/2019    Hematuria     Hemothorax     Hyperlipidemia     Hypertension     Hyperuricemia     Hypocalcemia     Hypokalemia     Hypophosphatemia     PAD (peripheral artery disease)     Proteinuria     Vitamin D  deficiency      Past Surgical History:   Procedure Laterality Date    ABDOMINAL AORTOGRAPHY N/A 5/10/2019    Procedure: AORTOGRAM-ABDOMINAL;  Surgeon: Keo Jenkins MD;  Location: [facility] CATH LAB/EP;  Service: Cardiology;  Laterality: N/A;  RSFA intervention     AORTOGRAPHY WITH SERIALOGRAPHY N/A 12/3/2021    Procedure: AORTOGRAM, WITH SERIALOGRAPHY;  Surgeon: Keo Jenkins MD;  Location: [facility] CATH LAB/EP;  Service: Cardiology;  Laterality: N/A;    AORTOGRAPHY WITH SERIALOGRAPHY N/A 4/1/2022    Procedure: AORTOGRAM, WITH SERIALOGRAPHY;  Surgeon: Keo Jenkins MD;  Location: [facility] CATH LAB/EP;  Service: Cardiology;  Laterality: N/A;    BONE MARROW BIOPSY Right 10/12/2021    Procedure: BIOPSY-BONE MARROW;  Surgeon: Naseem Woods MD;  Location: [facility] OR;  Service: Oncology;  Laterality: Right;    CARDIAC CATHETERIZATION      CATARACT EXTRACTION      CATARACT EXTRACTION W/  INTRAOCULAR LENS IMPLANT Right 6/8/2020    Procedure: EXTRACTION, CATARACT, WITH IOL INSERTION;  Surgeon: Tin Light MD;  Location: [facility] OR;  Service: Ophthalmology;  Laterality: Right;    CATARACT EXTRACTION W/  INTRAOCULAR LENS IMPLANT Left 7/2/2020    Procedure: EXTRACTION, CATARACT, WITH IOL INSERTION;  Surgeon: Tin Light MD;  Location: [facility] OR;  Service: Ophthalmology;  Laterality: Left;    COLONOSCOPY N/A 1/6/2022    Procedure: COLONOSCOPY;  Surgeon: Kathe Penaloza MD;  Location: [facility] ENDO [address]);  Service: Endoscopy;  Laterality: N/A;  COVID test at [facility] on 1/3-GT  okay to hold Plavix for 5 days and aspirin per Dr. Becerra  2nd floor due toextensive cardiac history   instructions mailed and my [facility] portal -     CORONARY ANGIOGRAPHY N/A 3/29/2019    Procedure: ANGIOGRAM, CORONARY ARTERY;  Surgeon: Keo Jenkins MD;  Location: [facility] CATH LAB/EP;  Service: Cardiology;  Laterality: N/A;    CORONARY ANGIOGRAPHY Left 10/11/2019    Procedure: ANGIOGRAM, CORONARY ARTERY;  Surgeon: Keo Jenkins MD;  Location: [facility] CATH  LAB/EP;  Service: Cardiology;  Laterality: Left;    CORONARY ANGIOPLASTY WITH STENT PLACEMENT  2019    mid and distal RCA    ESOPHAGOGASTRODUODENOSCOPY N/A 2022    Procedure: EGD (ESOPHAGOGASTRODUODENOSCOPY);  Surgeon: Kathe Penaloza MD;  Location: [address]);  Service: Endoscopy;  Laterality: N/A;    EYE SURGERY      INTRAVASCULAR ULTRASOUND, NON-CORONARY  2022    Procedure: Intravascular Ultrasound, Non-Coronary;  Surgeon: Keo Jenkins MD;  Location: [facility] CATH LAB/EP;  Service: Cardiology;;    LEFT HEART CATHETERIZATION N/A 3/29/2019    Procedure: Left heart cath;  Surgeon: Keo Jenkins MD;  Location: [facility] CATH LAB/EP;  Service: Cardiology;  Laterality: N/A;    LEFT HEART CATHETERIZATION Left 10/8/2019    Procedure: Left heart cath;  Surgeon: Keo Jenkins MD;  Location: [facility] CATH LAB/EP;  Service: Cardiology;  Laterality: Left;    PERCUTANEOUS TRANSLUMINAL ANGIOPLASTY (PTA) OF PERIPHERAL VESSEL N/A 2019    Procedure: PTA, PERIPHERAL VESSEL;  Surgeon: Keo Jenkins MD;  Location: [facility] CATH LAB/EP;  Service: Cardiology;  Laterality: N/A;    PERCUTANEOUS TRANSLUMINAL ANGIOPLASTY (PTA) OF PERIPHERAL VESSEL Left 2022    Procedure: PTA, PERIPHERAL VESSEL;  Surgeon: Keo Jenkins MD;  Location: [facility] CATH LAB/EP;  Service: Cardiology;  Laterality: Left;    PERCUTANEOUS TRANSLUMINAL ANGIOPLASTY (PTA) OF PERIPHERAL VESSEL Right 2022    Procedure: PTA, PERIPHERAL VESSEL;  Surgeon: Keo Jenkins MD;  Location: [facility] CATH LAB/EP;  Service: Cardiology;  Laterality: Right;     Family History   Problem Relation Age of Onset    Aneurysm Mother     Hypertension Mother     Heart disease Mother     Cancer Father     Diabetes Sister     Hypertension Sister     No Known Problems Brother     No Known Problems Son      Social History     Tobacco Use    Smoking status: Former     Types: Cigarettes     Quit date: 1999     Years since quittin.5    Smokeless tobacco: Never    Substance Use Topics    Alcohol use: No     Alcohol/week: 0.0 standard drinks    Drug use: No     Review of Systems   Constitutional:  Negative for chills and fever.   HENT:  Negative for congestion, facial swelling, rhinorrhea and sore throat.    Respiratory:  Positive for shortness of breath and wheezing. Negative for cough.    Cardiovascular:  Positive for chest pain. Negative for syncope.   Gastrointestinal:  Negative for abdominal pain, constipation, nausea and vomiting.   Genitourinary:  Negative for dysuria, frequency and hematuria.   Musculoskeletal:  Negative for arthralgias, back pain, myalgias and neck pain.   Skin:  Negative for rash and wound.   Neurological:  Negative for weakness and numbness.   Hematological:  Does not bruise/bleed easily.   Psychiatric/Behavioral:  Negative for hallucinations and suicidal ideas.    All other systems reviewed and are negative.    Physical Exam     Initial Vitals [11/12/22 0042]   BP Pulse Resp Temp SpO2   (!) 109/55 81 14 98.2 °F (36.8 °C) 99 %      MAP       --         Physical Exam    Nursing note and vitals reviewed.  Constitutional: He appears well-developed and well-nourished. No distress.   HENT:   Head: Normocephalic and atraumatic.   Right Ear: External ear normal.   Left Ear: External ear normal.   Nose: Nose normal.   Mouth/Throat: Oropharynx is clear and moist.   Eyes: Conjunctivae and EOM are normal. Pupils are equal, round, and reactive to light.   Neck: Neck supple.   Normal range of motion.  Cardiovascular:  Normal rate and intact distal pulses. An irregularly irregular rhythm present.           No murmur heard.  Pulmonary/Chest: No stridor. No respiratory distress. He has wheezes (RLL). He has no rhonchi. He has no rales.   Abdominal: Abdomen is soft. Bowel sounds are normal. There is no abdominal tenderness.   Musculoskeletal:         General: Edema (2+ BLE) present. Normal range of motion.      Cervical back: Normal range of motion and neck  supple.     Neurological: He is alert and oriented to person, place, and time. He has normal strength. No cranial nerve deficit or sensory deficit.   Skin: Skin is warm and dry. No rash noted.   Psychiatric: He has a normal mood and affect. Thought content normal.       ED Course   Critical Care    Date/Time: 11/12/2022 4:56 AM  Performed by: Avel Ocasio MD  Authorized by: Avel Ocasio MD   Direct patient critical care time: 10 minutes  Additional history critical care time: 7 minutes  Ordering / reviewing critical care time: 7 minutes  Documentation critical care time: 8 minutes  Consulting other physicians critical care time: 5 minutes  Total critical care time (exclusive of procedural time) : 37 minutes  Critical care time was exclusive of separately billable procedures and treating other patients and teaching time.  Critical care was necessary to treat or prevent imminent or life-threatening deterioration of the following conditions: cardiac failure and renal failure (Anemia requiring transfusion).  Critical care was time spent personally by me on the following activities: blood draw for specimens, discussions with consultants, interpretation of cardiac output measurements, evaluation of patient's response to treatment, examination of patient, obtaining history from patient or surrogate, ordering and review of laboratory studies, ordering and performing treatments and interventions, ordering and review of radiographic studies, pulse oximetry, re-evaluation of patient's condition and review of old charts.      Labs Reviewed   CBC W/ AUTO DIFFERENTIAL - Abnormal; Notable for the following components:       Result Value    RBC 2.30 (*)     Hemoglobin 6.4 (*)     Hematocrit 19.4 (*)     Lymph % 15.8 (*)     All other components within normal limits    Narrative:      H&H   critical result(s) called and verbal readback obtained from   Guera Pope RN by JJ5 11/12/2022 01:24   COMPREHENSIVE METABOLIC PANEL -  Abnormal; Notable for the following components:    Potassium 3.2 (*)     CO2 13 (*)     Glucose 123 (*)     BUN 32 (*)     Creatinine 2.5 (*)     Calcium 8.5 (*)     Albumin 2.9 (*)     eGFR 29 (*)     All other components within normal limits   B-TYPE NATRIURETIC PEPTIDE - Abnormal; Notable for the following components:     (*)     All other components within normal limits   CULTURE, BLOOD   CULTURE, BLOOD   TROPONIN I   LACTIC ACID, PLASMA   MAGNESIUM   TROPONIN I   TYPE & SCREEN   PREPARE RBC SOFT     EKG Readings: (Independently Interpreted)     Atrial fibrillation   Rate 86  Normal axis   PVCs   no Ischemic changes     Imaging Results              X-Ray Chest AP Portable (Final result)  Result time 11/12/22 01:24:30      Final result by Leodan Serrano MD (11/12/22 01:24:30)                   Impression:      Increased pleural effusion mainly on the right with worsening of mild interstitial prominence.  Correlate for developing edema.      Electronically signed by: Leodan Serrano  Date:    11/12/2022  Time:    01:24               Narrative:    EXAMINATION:  XR CHEST AP PORTABLE    CLINICAL HISTORY:  Chest Pain;    TECHNIQUE:  Single frontal view of the chest was performed.    COMPARISON:  The 08/19/2021    FINDINGS:  Reticular infiltrates do not appear significantly changed.  There is increased opacity in the lung base mainly on the right thought to be due to superimposed new or increased pleural fluid.                        ED Interpretation by Avel Ocasio MD (11/12/22 01:18:15, [facility] Emergency Dept, Emergency Medicine) Flagged as Abnormal    Fibrotic changes appear worse from prior. Possible effusion RLL. Edema bilaterally.                                     Medications   aspirin tablet 325 mg (325 mg Oral Not Given 11/12/22 0100)   albuterol inhaler 2 puff (has no administration in time range)   aspirin EC tablet 81 mg (has no administration in time range)   carvediloL tablet 25 mg (has  no administration in time range)   clopidogreL tablet 75 mg (has no administration in time range)   spironolactone tablet 50 mg (has no administration in time range)   atorvastatin tablet 40 mg (has no administration in time range)   sodium chloride 0.9% flush 10 mL (has no administration in time range)   naloxone 0.4 mg/mL injection 0.02 mg (has no administration in time range)   glucose chewable tablet 16 g (has no administration in time range)   glucose chewable tablet 24 g (has no administration in time range)   glucagon (human recombinant) injection 1 mg (has no administration in time range)   dextrose 10% bolus 125 mL (has no administration in time range)   dextrose 10% bolus 250 mL (has no administration in time range)   ondansetron disintegrating tablet 8 mg (has no administration in time range)   acetaminophen tablet 650 mg (has no administration in time range)   melatonin tablet 6 mg (has no administration in time range)   0.9%  NaCl infusion (for blood administration) (has no administration in time range)   albuterol-ipratropium 2.5 mg-0.5 mg/3 mL nebulizer solution 3 mL (has no administration in time range)   influenza (QUADRIVALENT PF) vaccine 0.5 mL (has no administration in time range)   furosemide injection 40 mg (40 mg Intravenous Given 11/12/22 0220)   potassium bicarbonate disintegrating tablet 50 mEq (50 mEq Oral Given 11/12/22 0458)   furosemide injection 80 mg (80 mg Intravenous Given 11/12/22 0458)        Additional MDM:   Heart Score:    History:          Moderately suspicious.  ECG:             Nonspecific repolarisation disturbance  Age:               45-65 years  Risk factors: >= 3 risk factors or history of atherosclerotic disease  Troponin:       Less than or equal to normal limit  Final Score: 5      Heart Failure Score:   Systolic BP = 100-109  Heart Rate = 80-85  Sodium = 137  COPD = Yes  Black = No  BUN = 30-39  Age = 60-69  Patient has a GWTG HF Risk Score for In-Hospital Mortality  of 46 which translates into a probability of death of 1-5% (Low Risk).        ED Course as of 11/12/22 0458   Sat Nov 12, 2022   0211 Medicine paged [KB]   2521 repaged [KB]   4183 Spoke with Hospital medicine who will eval patient for further observation. [KB]      ED Course User Index  [KB] Avel Ocasio MD                 Clinical Impression:   Final diagnoses:  [R07.9] Chest pain  [D64.9] Symptomatic anemia (Primary)  [I50.9] Pleural effusion due to congestive heart failure        ED Disposition Condition    Observation                 Avel Ocasio MD  11/12/22 0457

## 2022-11-13 PROBLEM — R09.02 HYPOXIA: Status: RESOLVED | Noted: 2020-01-30 | Resolved: 2022-11-13

## 2022-11-13 PROBLEM — I48.0 PAROXYSMAL ATRIAL FIBRILLATION: Status: ACTIVE | Noted: 2022-11-12

## 2022-11-13 LAB
ALBUMIN SERPL BCP-MCNC: 3 G/DL (ref 3.5–5.2)
ALP SERPL-CCNC: 64 U/L (ref 55–135)
ALT SERPL W/O P-5'-P-CCNC: 13 U/L (ref 10–44)
ANION GAP SERPL CALC-SCNC: 10 MMOL/L (ref 8–16)
ANION GAP SERPL CALC-SCNC: 17 MMOL/L (ref 8–16)
ASCENDING AORTA: 3.2 CM
AST SERPL-CCNC: 18 U/L (ref 10–40)
AV INDEX (PROSTH): 0.75
AV MEAN GRADIENT: 4 MMHG
AV PEAK GRADIENT: 8 MMHG
AV VALVE AREA: 2.97 CM2
AV VELOCITY RATIO: 0.74
BASOPHILS # BLD AUTO: 0.03 K/UL (ref 0–0.2)
BASOPHILS NFR BLD: 0.5 % (ref 0–1.9)
BILIRUB SERPL-MCNC: 0.6 MG/DL (ref 0.1–1)
BSA FOR ECHO PROCEDURE: 1.92 M2
BUN SERPL-MCNC: 38 MG/DL (ref 8–23)
BUN SERPL-MCNC: 39 MG/DL (ref 8–23)
CALCIUM SERPL-MCNC: 9.3 MG/DL (ref 8.7–10.5)
CALCIUM SERPL-MCNC: 9.8 MG/DL (ref 8.7–10.5)
CHLORIDE SERPL-SCNC: 101 MMOL/L (ref 95–110)
CHLORIDE SERPL-SCNC: 104 MMOL/L (ref 95–110)
CO2 SERPL-SCNC: 19 MMOL/L (ref 23–29)
CO2 SERPL-SCNC: 26 MMOL/L (ref 23–29)
CREAT SERPL-MCNC: 2.6 MG/DL (ref 0.5–1.4)
CREAT SERPL-MCNC: 2.7 MG/DL (ref 0.5–1.4)
CV ECHO LV RWT: 0.45 CM
DIFFERENTIAL METHOD: ABNORMAL
DOP CALC AO PEAK VEL: 1.4 M/S
DOP CALC AO VTI: 28.9 CM
DOP CALC LVOT AREA: 4 CM2
DOP CALC LVOT DIAMETER: 2.25 CM
DOP CALC LVOT PEAK VEL: 1.04 M/S
DOP CALC LVOT STROKE VOLUME: 85.84 CM3
DOP CALC MV VTI: 35.6 CM
DOP CALCLVOT PEAK VEL VTI: 21.6 CM
ECHO LV POSTERIOR WALL: 1.23 CM (ref 0.6–1.1)
EJECTION FRACTION: 55 %
EOSINOPHIL # BLD AUTO: 0.2 K/UL (ref 0–0.5)
EOSINOPHIL NFR BLD: 2.3 % (ref 0–8)
ERYTHROCYTE [DISTWIDTH] IN BLOOD BY AUTOMATED COUNT: 13.1 % (ref 11.5–14.5)
EST. GFR  (NO RACE VARIABLE): 26 ML/MIN/1.73 M^2
EST. GFR  (NO RACE VARIABLE): 27 ML/MIN/1.73 M^2
FRACTIONAL SHORTENING: 24 % (ref 28–44)
GLUCOSE SERPL-MCNC: 108 MG/DL (ref 70–110)
GLUCOSE SERPL-MCNC: 94 MG/DL (ref 70–110)
HCT VFR BLD AUTO: 22.6 % (ref 40–54)
HGB BLD-MCNC: 7.6 G/DL (ref 14–18)
IMM GRANULOCYTES # BLD AUTO: 0.02 K/UL (ref 0–0.04)
IMM GRANULOCYTES NFR BLD AUTO: 0.3 % (ref 0–0.5)
INTERVENTRICULAR SEPTUM: 1.31 CM (ref 0.6–1.1)
IVC DIAMETER: 2.38 CM
LA MAJOR: 5.74 CM
LA MINOR: 5.48 CM
LA WIDTH: 3.7 CM
LEFT ATRIUM SIZE: 3.42 CM
LEFT ATRIUM VOLUME INDEX MOD: 22.9 ML/M2
LEFT ATRIUM VOLUME INDEX: 31.4 ML/M2
LEFT ATRIUM VOLUME MOD: 43.97 CM3
LEFT ATRIUM VOLUME: 60.31 CM3
LEFT INTERNAL DIMENSION IN SYSTOLE: 4.15 CM (ref 2.1–4)
LEFT VENTRICLE DIASTOLIC VOLUME INDEX: 75.73 ML/M2
LEFT VENTRICLE DIASTOLIC VOLUME: 145.41 ML
LEFT VENTRICLE MASS INDEX: 152 G/M2
LEFT VENTRICLE SYSTOLIC VOLUME INDEX: 39.7 ML/M2
LEFT VENTRICLE SYSTOLIC VOLUME: 76.31 ML
LEFT VENTRICULAR INTERNAL DIMENSION IN DIASTOLE: 5.47 CM (ref 3.5–6)
LEFT VENTRICULAR MASS: 292.01 G
LVOT MG: 2.28 MMHG
LVOT MV: 0.7 CM/S
LYMPHOCYTES # BLD AUTO: 1.2 K/UL (ref 1–4.8)
LYMPHOCYTES NFR BLD: 18.3 % (ref 18–48)
MAGNESIUM SERPL-MCNC: 1.6 MG/DL (ref 1.6–2.6)
MCH RBC QN AUTO: 27.9 PG (ref 27–31)
MCHC RBC AUTO-ENTMCNC: 33.6 G/DL (ref 32–36)
MCV RBC AUTO: 83 FL (ref 82–98)
MONOCYTES # BLD AUTO: 0.7 K/UL (ref 0.3–1)
MONOCYTES NFR BLD: 10.6 % (ref 4–15)
MV MEAN GRADIENT: 2 MMHG
MV PEAK GRADIENT: 8 MMHG
MV VALVE AREA BY CONTINUITY EQUATION: 2.41 CM2
NEUTROPHILS # BLD AUTO: 4.4 K/UL (ref 1.8–7.7)
NEUTROPHILS NFR BLD: 68 % (ref 38–73)
NRBC BLD-RTO: 0 /100 WBC
PHOSPHATE SERPL-MCNC: 3.5 MG/DL (ref 2.7–4.5)
PISA TR MAX VEL: 2.35 M/S
PLATELET # BLD AUTO: 164 K/UL (ref 150–450)
PMV BLD AUTO: 9.8 FL (ref 9.2–12.9)
POTASSIUM SERPL-SCNC: 2.7 MMOL/L (ref 3.5–5.1)
POTASSIUM SERPL-SCNC: 3.3 MMOL/L (ref 3.5–5.1)
POTASSIUM SERPL-SCNC: 3.4 MMOL/L (ref 3.5–5.1)
PROT SERPL-MCNC: 6.5 G/DL (ref 6–8.4)
PROT UR-MCNC: 200 MG/DL (ref 0–15)
PV MV: 0.95 M/S
PV PEAK VELOCITY: 1.45 CM/S
RA MAJOR: 5.38 CM
RA PRESSURE: 8 MMHG
RA WIDTH: 4.4 CM
RBC # BLD AUTO: 2.72 M/UL (ref 4.6–6.2)
RIGHT VENTRICULAR END-DIASTOLIC DIMENSION: 2.94 CM
SODIUM SERPL-SCNC: 137 MMOL/L (ref 136–145)
SODIUM SERPL-SCNC: 140 MMOL/L (ref 136–145)
STJ: 2.88 CM
TR MAX PG: 22 MMHG
TV REST PULMONARY ARTERY PRESSURE: 30 MMHG
WBC # BLD AUTO: 6.51 K/UL (ref 3.9–12.7)

## 2022-11-13 PROCEDURE — 83735 ASSAY OF MAGNESIUM: CPT | Performed by: NURSE PRACTITIONER

## 2022-11-13 PROCEDURE — 25000242 PHARM REV CODE 250 ALT 637 W/ HCPCS: Performed by: INTERNAL MEDICINE

## 2022-11-13 PROCEDURE — 63600175 PHARM REV CODE 636 W HCPCS: Performed by: NURSE PRACTITIONER

## 2022-11-13 PROCEDURE — 84156 ASSAY OF PROTEIN URINE: CPT | Performed by: INTERNAL MEDICINE

## 2022-11-13 PROCEDURE — 86335 PATHOLOGIST INTERPRETATION UIFE: ICD-10-PCS | Mod: 26,,, | Performed by: PATHOLOGY

## 2022-11-13 PROCEDURE — 80053 COMPREHEN METABOLIC PANEL: CPT | Performed by: NURSE PRACTITIONER

## 2022-11-13 PROCEDURE — 25000003 PHARM REV CODE 250: Performed by: NURSE PRACTITIONER

## 2022-11-13 PROCEDURE — 25000003 PHARM REV CODE 250: Performed by: INTERNAL MEDICINE

## 2022-11-13 PROCEDURE — 84100 ASSAY OF PHOSPHORUS: CPT | Performed by: NURSE PRACTITIONER

## 2022-11-13 PROCEDURE — 85025 COMPLETE CBC W/AUTO DIFF WBC: CPT | Performed by: NURSE PRACTITIONER

## 2022-11-13 PROCEDURE — 94640 AIRWAY INHALATION TREATMENT: CPT

## 2022-11-13 PROCEDURE — 99223 PR INITIAL HOSPITAL CARE,LEVL III: ICD-10-PCS | Mod: ,,, | Performed by: INTERNAL MEDICINE

## 2022-11-13 PROCEDURE — 11000001 HC ACUTE MED/SURG PRIVATE ROOM

## 2022-11-13 PROCEDURE — 86334 IMMUNOFIX E-PHORESIS SERUM: CPT | Performed by: INTERNAL MEDICINE

## 2022-11-13 PROCEDURE — 84132 ASSAY OF SERUM POTASSIUM: CPT | Performed by: INTERNAL MEDICINE

## 2022-11-13 PROCEDURE — 80048 BASIC METABOLIC PNL TOTAL CA: CPT | Mod: XB | Performed by: NURSE PRACTITIONER

## 2022-11-13 PROCEDURE — 86335 IMMUNFIX E-PHORSIS/URINE/CSF: CPT | Performed by: INTERNAL MEDICINE

## 2022-11-13 PROCEDURE — 25000003 PHARM REV CODE 250: Performed by: HOSPITALIST

## 2022-11-13 PROCEDURE — 86334 PATHOLOGIST INTERPRETATION IFE: ICD-10-PCS | Mod: 26,,, | Performed by: PATHOLOGY

## 2022-11-13 PROCEDURE — 86335 IMMUNFIX E-PHORSIS/URINE/CSF: CPT | Mod: 26,,, | Performed by: PATHOLOGY

## 2022-11-13 PROCEDURE — 94640 AIRWAY INHALATION TREATMENT: CPT | Mod: XB

## 2022-11-13 PROCEDURE — 99223 1ST HOSP IP/OBS HIGH 75: CPT | Mod: ,,, | Performed by: INTERNAL MEDICINE

## 2022-11-13 PROCEDURE — 86334 IMMUNOFIX E-PHORESIS SERUM: CPT | Mod: 26,,, | Performed by: PATHOLOGY

## 2022-11-13 PROCEDURE — 94761 N-INVAS EAR/PLS OXIMETRY MLT: CPT

## 2022-11-13 PROCEDURE — 36415 COLL VENOUS BLD VENIPUNCTURE: CPT | Performed by: INTERNAL MEDICINE

## 2022-11-13 RX ORDER — POTASSIUM CHLORIDE 20 MEQ/1
40 TABLET, EXTENDED RELEASE ORAL 2 TIMES DAILY
Status: DISCONTINUED | OUTPATIENT
Start: 2022-11-13 | End: 2022-11-14

## 2022-11-13 RX ORDER — LANOLIN ALCOHOL/MO/W.PET/CERES
400 CREAM (GRAM) TOPICAL 3 TIMES DAILY
Status: COMPLETED | OUTPATIENT
Start: 2022-11-13 | End: 2022-11-13

## 2022-11-13 RX ORDER — LANOLIN ALCOHOL/MO/W.PET/CERES
400 CREAM (GRAM) TOPICAL 2 TIMES DAILY
Status: DISCONTINUED | OUTPATIENT
Start: 2022-11-13 | End: 2022-11-13

## 2022-11-13 RX ORDER — SODIUM CHLORIDE 9 MG/ML
INJECTION, SOLUTION INTRAVENOUS
Status: DISCONTINUED | OUTPATIENT
Start: 2022-11-13 | End: 2022-11-14 | Stop reason: HOSPADM

## 2022-11-13 RX ORDER — POTASSIUM CHLORIDE 7.45 MG/ML
10 INJECTION INTRAVENOUS
Status: COMPLETED | OUTPATIENT
Start: 2022-11-13 | End: 2022-11-13

## 2022-11-13 RX ADMIN — IPRATROPIUM BROMIDE AND ALBUTEROL SULFATE 3 ML: .5; 3 SOLUTION RESPIRATORY (INHALATION) at 07:11

## 2022-11-13 RX ADMIN — MAGNESIUM OXIDE TAB 400 MG (241.3 MG ELEMENTAL MG) 400 MG: 400 (241.3 MG) TAB at 12:11

## 2022-11-13 RX ADMIN — FUROSEMIDE 40 MG: 10 INJECTION, SOLUTION INTRAVENOUS at 09:11

## 2022-11-13 RX ADMIN — ASPIRIN 81 MG: 81 TABLET, COATED ORAL at 09:11

## 2022-11-13 RX ADMIN — CARVEDILOL 25 MG: 25 TABLET, FILM COATED ORAL at 04:11

## 2022-11-13 RX ADMIN — POTASSIUM CHLORIDE 10 MEQ: 10 INJECTION, SOLUTION INTRAVENOUS at 04:11

## 2022-11-13 RX ADMIN — POTASSIUM CHLORIDE 10 MEQ: 10 INJECTION, SOLUTION INTRAVENOUS at 06:11

## 2022-11-13 RX ADMIN — IPRATROPIUM BROMIDE AND ALBUTEROL SULFATE 3 ML: .5; 3 SOLUTION RESPIRATORY (INHALATION) at 12:11

## 2022-11-13 RX ADMIN — MAGNESIUM OXIDE TAB 400 MG (241.3 MG ELEMENTAL MG) 400 MG: 400 (241.3 MG) TAB at 03:11

## 2022-11-13 RX ADMIN — POTASSIUM CHLORIDE 40 MEQ: 1500 TABLET, EXTENDED RELEASE ORAL at 09:11

## 2022-11-13 RX ADMIN — ATORVASTATIN CALCIUM 40 MG: 40 TABLET, FILM COATED ORAL at 09:11

## 2022-11-13 RX ADMIN — CARVEDILOL 25 MG: 25 TABLET, FILM COATED ORAL at 09:11

## 2022-11-13 RX ADMIN — SPIRONOLACTONE 50 MG: 25 TABLET, FILM COATED ORAL at 09:11

## 2022-11-13 RX ADMIN — CLOPIDOGREL BISULFATE 75 MG: 75 TABLET ORAL at 09:11

## 2022-11-13 RX ADMIN — IPRATROPIUM BROMIDE AND ALBUTEROL SULFATE 3 ML: .5; 3 SOLUTION RESPIRATORY (INHALATION) at 01:11

## 2022-11-13 RX ADMIN — MAGNESIUM OXIDE TAB 400 MG (241.3 MG ELEMENTAL MG) 400 MG: 400 (241.3 MG) TAB at 09:11

## 2022-11-13 RX ADMIN — SODIUM CHLORIDE: 0.9 INJECTION, SOLUTION INTRAVENOUS at 04:11

## 2022-11-13 NOTE — ASSESSMENT & PLAN NOTE
- Baseline sCr 2.5-3; at baseline now  - pRBC transfusion as above  - Avoid further nephrotoxic agents  - IV diuretics as above  - Recommend outpatient nephrology    Will need OP nephrology  He tells me he doesn't have one anymore  Refer to nephrology

## 2022-11-13 NOTE — ASSESSMENT & PLAN NOTE
- Constant and stabbing in nature. Troponin negative x 1  - Etiology less likely ACS. Could be due to anemia and pulm edema  - Repeat 2nd troponin, ECG PRN  - S/p IV Lasix  - Will repeat diuretic dosing  - Telemetry        Resolved

## 2022-11-13 NOTE — ASSESSMENT & PLAN NOTE
- CXR with pulm edema and R pleural effusion  - 2/2 CKD/HFpEF  - Will repeat IV Lasix given blood transfusion starting  - Continue diuresis  - Titrate off oxygen as able  - Continuous pulse oximetry    Resolved

## 2022-11-13 NOTE — ASSESSMENT & PLAN NOTE
- Chronic issue despite being on MRA and having significant CKD  - Could be RTA?  - Will give 50 meq KCl x 1  - Repeat BMP in AM  - Telemetry for arrhythmia  - Will be careful with K replacement as risk factors for hyperkalemia    Refractory  Magnesium normal  Likely due to aggressive diuresis  Replete  BMP, Mg in am

## 2022-11-13 NOTE — ASSESSMENT & PLAN NOTE
His chest pain occurred in setting of severe anemia.  He remains on aspirin and Plavix.  He has had three-vessel stenting in the past.

## 2022-11-13 NOTE — SUBJECTIVE & OBJECTIVE
Past Medical History:   Diagnosis Date    Allergy     Anemia     Anticoagulant long-term use     Arthritis     CKD (chronic kidney disease) stage 4, GFR 15-29 ml/min     COPD (chronic obstructive pulmonary disease) 08/20/2021    Coronary artery disease     Heart attack 10/04/2019    Hematuria     Hemothorax     Hyperlipidemia     Hypertension     Hyperuricemia     Hypocalcemia     Hypokalemia     Hypophosphatemia     PAD (peripheral artery disease)     Proteinuria     Vitamin D deficiency        Past Surgical History:   Procedure Laterality Date    ABDOMINAL AORTOGRAPHY N/A 5/10/2019    Procedure: AORTOGRAM-ABDOMINAL;  Surgeon: Keo Jenkins MD;  Location: [facility] CATH LAB/EP;  Service: Cardiology;  Laterality: N/A;  RSFA intervention     AORTOGRAPHY WITH SERIALOGRAPHY N/A 12/3/2021    Procedure: AORTOGRAM, WITH SERIALOGRAPHY;  Surgeon: Keo Jenkins MD;  Location: [facility] CATH LAB/EP;  Service: Cardiology;  Laterality: N/A;    AORTOGRAPHY WITH SERIALOGRAPHY N/A 4/1/2022    Procedure: AORTOGRAM, WITH SERIALOGRAPHY;  Surgeon: Keo Jenkins MD;  Location: [facility] CATH LAB/EP;  Service: Cardiology;  Laterality: N/A;    BONE MARROW BIOPSY Right 10/12/2021    Procedure: BIOPSY-BONE MARROW;  Surgeon: Naseem Woods MD;  Location: [facility] OR;  Service: Oncology;  Laterality: Right;    CARDIAC CATHETERIZATION      CATARACT EXTRACTION      CATARACT EXTRACTION W/  INTRAOCULAR LENS IMPLANT Right 6/8/2020    Procedure: EXTRACTION, CATARACT, WITH IOL INSERTION;  Surgeon: Tin Light MD;  Location: [facility] OR;  Service: Ophthalmology;  Laterality: Right;    CATARACT EXTRACTION W/  INTRAOCULAR LENS IMPLANT Left 7/2/2020    Procedure: EXTRACTION, CATARACT, WITH IOL INSERTION;  Surgeon: Tin Light MD;  Location: [facility] OR;  Service: Ophthalmology;  Laterality: Left;    COLONOSCOPY N/A 1/6/2022    Procedure: COLONOSCOPY;  Surgeon: Kathe Penaloza MD;  Location: [facility] ENDO ([address] [place] [address]);  Service: Endoscopy;  Laterality: N/A;  COVID test at  [facility] on 1/3-GT  okay to hold Plavix for 5 days and aspirin per Dr. Becerra  2nd floor due toextensive cardiac history   instructions mailed and my [facility] portal -     CORONARY ANGIOGRAPHY N/A 3/29/2019    Procedure: ANGIOGRAM, CORONARY ARTERY;  Surgeon: Keo Jenkins MD;  Location: [facility] CATH LAB/EP;  Service: Cardiology;  Laterality: N/A;    CORONARY ANGIOGRAPHY Left 10/11/2019    Procedure: ANGIOGRAM, CORONARY ARTERY;  Surgeon: Keo Jenkins MD;  Location: [facility] CATH LAB/EP;  Service: Cardiology;  Laterality: Left;    CORONARY ANGIOPLASTY WITH STENT PLACEMENT  03/29/2019    mid and distal RCA    ESOPHAGOGASTRODUODENOSCOPY N/A 1/6/2022    Procedure: EGD (ESOPHAGOGASTRODUODENOSCOPY);  Surgeon: Kathe Penaloza MD;  [facility]  Service: Endoscopy;  Laterality: N/A;    EYE SURGERY      INTRAVASCULAR ULTRASOUND, NON-CORONARY  8/12/2022    Procedure: Intravascular Ultrasound, Non-Coronary;  Surgeon: Keo Jenkins MD;  Location: [facility] CATH LAB/EP;  Service: Cardiology;;    LEFT HEART CATHETERIZATION N/A 3/29/2019    Procedure: Left heart cath;  Surgeon: Keo Jenkins MD;  Location: [facility] CATH LAB/EP;  Service: Cardiology;  Laterality: N/A;    LEFT HEART CATHETERIZATION Left 10/8/2019    Procedure: Left heart cath;  Surgeon: Keo Jenkins MD;  Location: [facility] CATH LAB/EP;  Service: Cardiology;  Laterality: Left;    PERCUTANEOUS TRANSLUMINAL ANGIOPLASTY (PTA) OF PERIPHERAL VESSEL N/A 7/12/2019    Procedure: PTA, PERIPHERAL VESSEL;  Surgeon: Keo Jenkins MD;  Location: [facility] CATH LAB/EP;  Service: Cardiology;  Laterality: N/A;    PERCUTANEOUS TRANSLUMINAL ANGIOPLASTY (PTA) OF PERIPHERAL VESSEL Left 5/20/2022    Procedure: PTA, PERIPHERAL VESSEL;  Surgeon: Keo Jenkins MD;  Location: [facility] CATH LAB/EP;  Service: Cardiology;  Laterality: Left;    PERCUTANEOUS TRANSLUMINAL ANGIOPLASTY (PTA) OF PERIPHERAL VESSEL Right 8/12/2022    Procedure: PTA, PERIPHERAL VESSEL;  Surgeon: Keo Jenkins  MD;  Location: [facility] CATH LAB/EP;  Service: Cardiology;  Laterality: Right;       Review of patient's allergies indicates:   Allergen Reactions    Ace inhibitors Rash       No current facility-administered medications on file prior to encounter.     Current Outpatient Medications on File Prior to Encounter   Medication Sig    albuterol (VENTOLIN HFA) 90 mcg/actuation inhaler Inhale 2 puffs into the lungs every 6 (six) hours as needed for Wheezing. Rescue    ASPIRIN (ASPIR-81 ORAL) Take 81 mg by mouth once daily.    carvediloL (COREG) 25 MG tablet Take 2 tablets (50 mg total) by mouth 2 (two) times daily with meals.    clopidogreL (PLAVIX) 75 mg tablet Take 1 tablet (75 mg total) by mouth once daily.    coenzyme Q10 100 mg capsule Take 100 mg by mouth once daily.    eplerenone (INSPRA) 50 MG Tab Take 1 tablet (50 mg total) by mouth once daily.    hydrALAZINE (APRESOLINE) 100 MG tablet Take 1 tablet (100 mg total) by mouth 2 (two) times a day.    potassium chloride SA (K-DUR,KLOR-CON) 20 MEQ tablet Take 2 tablets (40 mEq total) by mouth 2 (two) times daily.    rosuvastatin (CRESTOR) 40 MG Tab Take 1 tablet (40 mg total) by mouth every evening.    mupirocin (BACTROBAN) 2 % ointment Apply topically 3 (three) times daily. (Patient not taking: Reported on 2022)     Family History       Problem Relation (Age of Onset)    Aneurysm Mother    Cancer Father    Diabetes Sister    Heart disease Mother    Hypertension Mother, Sister    No Known Problems Brother, Son          Tobacco Use    Smoking status: Former     Types: Cigarettes     Quit date: 1999     Years since quittin.5    Smokeless tobacco: Never   Substance and Sexual Activity    Alcohol use: No     Alcohol/week: 0.0 standard drinks    Drug use: No    Sexual activity: Yes     Partners: Female     Review of Systems   Constitutional: Negative for chills, decreased appetite, diaphoresis, fever, malaise/fatigue, night sweats, weight gain and weight loss.    HENT:  Negative for congestion, ear discharge, ear pain, hearing loss, hoarse voice, nosebleeds, odynophagia, sore throat, stridor and tinnitus.    Eyes:  Negative for blurred vision, discharge, double vision, pain, photophobia, redness, vision loss in left eye, vision loss in right eye, visual disturbance and visual halos.   Cardiovascular:  Positive for chest pain and dyspnea on exertion. Negative for claudication, cyanosis, irregular heartbeat, leg swelling, near-syncope, orthopnea, palpitations, paroxysmal nocturnal dyspnea and syncope.   Respiratory:  Positive for shortness of breath. Negative for cough, hemoptysis, sleep disturbances due to breathing, snoring, sputum production and wheezing.    Endocrine: Negative for cold intolerance, heat intolerance, polydipsia, polyphagia and polyuria.   Hematologic/Lymphatic: Negative for adenopathy and bleeding problem. Does not bruise/bleed easily.   Skin:  Negative for color change, dry skin, flushing, itching, nail changes, poor wound healing, rash, skin cancer, suspicious lesions and unusual hair distribution.   Musculoskeletal:  Negative for arthritis, back pain, falls, gout, joint pain, joint swelling, muscle cramps, muscle weakness, myalgias, neck pain and stiffness.   Gastrointestinal:  Negative for bloating, abdominal pain, anorexia, change in bowel habit, bowel incontinence, constipation, diarrhea, dysphagia, excessive appetite, flatus, heartburn, hematemesis, hematochezia, hemorrhoids, jaundice, melena, nausea and vomiting.   Genitourinary:  Negative for bladder incontinence, decreased libido, dysuria, flank pain, frequency, genital sores, hematuria, hesitancy, incomplete emptying, nocturia and urgency.   Neurological:  Negative for aphonia, brief paralysis, difficulty with concentration, disturbances in coordination, excessive daytime sleepiness, dizziness, focal weakness, headaches, light-headedness, loss of balance, numbness, paresthesias, seizures,  sensory change, tremors, vertigo and weakness.   Psychiatric/Behavioral:  Negative for altered mental status, depression, hallucinations, memory loss, substance abuse, suicidal ideas and thoughts of violence. The patient does not have insomnia and is not nervous/anxious.    Allergic/Immunologic: Negative for hives and persistent infections.   Objective:     Vital Signs (Most Recent):  Temp: 96 °F (35.6 °C) (11/13/22 0828)  Pulse: 83 (11/13/22 0828)  Resp: 18 (11/13/22 0828)  BP: (!) 155/75 (11/13/22 0828)  SpO2: 95 % (11/13/22 0828)   Vital Signs (24h Range):  Temp:  [96 °F (35.6 °C)-98.8 °F (37.1 °C)] 96 °F (35.6 °C)  Pulse:  [75-95] 83  Resp:  [16-20] 18  SpO2:  [94 %-97 %] 95 %  BP: (116-155)/(60-75) 155/75     Weight: 74.4 kg (164 lb)  Body mass index is 23.53 kg/m².    SpO2: 95 %  O2 Device (Oxygen Therapy): room air      Intake/Output Summary (Last 24 hours) at 11/13/2022 1027  Last data filed at 11/13/2022 0540  Gross per 24 hour   Intake 360 ml   Output 2000 ml   Net -1640 ml       Lines/Drains/Airways       Peripheral Intravenous Line  Duration                  Peripheral IV - Single Lumen 11/12/22 0044 20 G Left Forearm 1 day         Peripheral IV - Single Lumen 11/12/22 0154 20 G Right Antecubital 1 day                    Physical Exam  Constitutional:       General: He is not in acute distress.     Appearance: He is well-developed. He is not diaphoretic.   HENT:      Head: Normocephalic and atraumatic.      Nose: Nose normal.   Eyes:      General: No scleral icterus.        Right eye: No discharge.      Conjunctiva/sclera: Conjunctivae normal.      Pupils: Pupils are equal, round, and reactive to light.   Neck:      Thyroid: No thyromegaly.      Vascular: No JVD.      Trachea: No tracheal deviation.   Cardiovascular:      Rate and Rhythm: Normal rate. Rhythm irregularly irregular.      Pulses:           Carotid pulses are 2+ on the right side and 2+ on the left side.       Radial pulses are 2+ on the  right side and 2+ on the left side.        Dorsalis pedis pulses are 2+ on the right side and 2+ on the left side.        Posterior tibial pulses are 2+ on the right side and 2+ on the left side.      Heart sounds: Normal heart sounds. No murmur heard.    No friction rub. No gallop.   Pulmonary:      Effort: Pulmonary effort is normal. No respiratory distress.      Breath sounds: Normal breath sounds. No stridor. No wheezing or rales.   Chest:      Chest wall: No tenderness.   Abdominal:      General: Bowel sounds are normal. There is no distension.      Palpations: Abdomen is soft. There is no mass.      Tenderness: There is no abdominal tenderness. There is no guarding or rebound.   Musculoskeletal:         General: No tenderness. Normal range of motion.      Cervical back: Normal range of motion and neck supple.   Lymphadenopathy:      Cervical: No cervical adenopathy.   Skin:     General: Skin is warm and dry.      Coloration: Skin is not pale.      Findings: No erythema or rash.   Neurological:      Mental Status: He is alert and oriented to person, place, and time.      Cranial Nerves: No cranial nerve deficit.      Coordination: Coordination normal.   Psychiatric:         Behavior: Behavior normal.         Thought Content: Thought content normal.         Judgment: Judgment normal.       Significant Labs: BMP:   Recent Labs   Lab 11/12/22  0112 11/12/22  0153 11/12/22  0824 11/13/22  0232   *  --  105 94     --  138 140   K 3.2*  --  3.4* 2.7*     --  108 104   CO2 13*  --  21* 19*   BUN 32*  --  35* 39*   CREATININE 2.5*  --  2.7* 2.6*   CALCIUM 8.5*  --  9.0 9.3   MG  --  1.7 1.8 1.6   , CMP   Recent Labs   Lab 11/12/22  0112 11/12/22  0824 11/13/22  0232    138 140   K 3.2* 3.4* 2.7*    108 104   CO2 13* 21* 19*   * 105 94   BUN 32* 35* 39*   CREATININE 2.5* 2.7* 2.6*   CALCIUM 8.5* 9.0 9.3   PROT 6.2  --  6.5   ALBUMIN 2.9*  --  3.0*   BILITOT 0.5  --  0.6   ALKPHOS 58   --  64   AST 23  --  18   ALT 15  --  13   ANIONGAP 14 9 17*   , CBC   Recent Labs   Lab 11/12/22  0112 11/12/22  0824 11/13/22  0232   WBC 7.07 6.25 6.51   HGB 6.4* 7.4* 7.6*   HCT 19.4* 22.1* 22.6*    148* 164   , INR No results for input(s): INR, PROTIME in the last 48 hours., Lipid Panel No results for input(s): CHOL, HDL, LDLCALC, TRIG, CHOLHDL in the last 48 hours., Troponin   Recent Labs   Lab 11/12/22  0112 11/12/22  0519   TROPONINI 0.011 0.012   , and All pertinent lab results from the last 24 hours have been reviewed.    Significant Imaging:

## 2022-11-13 NOTE — ASSESSMENT & PLAN NOTE
Patient with Persistent (7 days or more) atrial fibrillation which is controlled currently with Beta Blocker. Patient is currently in atrial fibrillation.[id number] Score: 1. Anticoagulation not indicated due to intolerance.    Cards plans for DCCV in am  NPO after MN

## 2022-11-13 NOTE — PROGRESS NOTES
[facility] Medicine  Progress Note    Patient Name: Domingo Gross  MRN: [id number]  Patient Class: OP- Observation   Admission Date: 11/12/2022  Length of Stay: 0 days  Attending Physician: Pawan Garcia, *  Primary Care Provider: Analy Encarnacion MD        Subjective:     Principal Problem:Anemia due to stage 4 chronic kidney disease    HPI:  63 yo male with a PMHX of CAD (PCI Lcx and RCA), severe PAD s/p stents, CKD Stage 4 with chronic anemia, COPD, persistent Afib not on AC here for stabbing chest pain and SOB.    Patient is a poor historian. Difficult to obtain full story. It appears he started having substernal chest pain this afternoon that has been constant and stabbing, unrelated to exercise. He does have chest pain at baseline occasionally (angina?) but this is slightly different. Also noticed worsening SOB as of today, although he sounds NYHA IIIA at baseline. Denies any worsening orthopnea (sleeps flat), PND, palpitations, syncope, dizziness, melena, hematochezia, hematemesis. Does have generalized weakness and noted worsening leg edema.    In the ED, patient was noted to be Afib 80s with PVCs, SBP 100s. Labs with worsening Hgb to 6.4 from baseline 8-9 and sCr at baseline of 2.5.  with negative troponin. He was given IV Lasix 40mg x 1 with surprisingly OK UOP for now. 1U pRBC ordered and starting being transfused. Admitted to inpatient for further management.      Overview/Hospital Course:  He presented with dyspnea and activity intolerance + chest pain  Denied chest pain to me on initial visit the next day  Found on presentation to have bilateral pleural effusions, Rt > Lt  Also found to be in Afib reportedly with history of long standing A fib > 1 year (per note)  ; Trop normal  He was requiring nasal cannula oxygen to maintain adequate oxygenation, which was new  Diuresis initiated but carefully considering CKD  Echo requested  Cards consulted     Also found  on presentation to have Hgb 6.4 (baseline 9.7-10.2)   Despite baseline as above, he has been dropping his Hgb since May 2022 with average Hgb from May to present (7.8-8.2)  He denies overt blood loss  He has known CKD which is likely etiology of anemia  MCV (84) & MCHC (33.5) normal  Ferritin in July 253  He received one unit PRBC with subsequent Hgb 7.4  Should likely start colony stimulating factors per renal known to him OP     Additionally, he was also found to have potassium of 2.6  Electrolytes repleted       Renal and cards consult pending    11/13: Feels well. Denies chest pain or continued dyspnea. Has been weaned to RA. I eager to discharge but after seen by cardiology it has come to light that his A fib is not chronic. He is intolerant of thinners. Cards recommending DCCV in am. NPO after MN.       Interval History:   Denies chest pain  Denies dyspnea  Cards note reviewed  Plans for DCCV in am  Hypokalemia note, replacement underway  NPO after MN    Review of Systems   Constitutional:  Positive for activity change.   HENT: Negative.     Eyes: Negative.    Respiratory: Negative.     Cardiovascular: Negative.    Gastrointestinal: Negative.    Endocrine: Negative.    Genitourinary: Negative.    Musculoskeletal: Negative.    Skin: Negative.    Allergic/Immunologic: Negative.    Neurological: Negative.    Hematological: Negative.    Psychiatric/Behavioral: Negative.     Objective:     Vital Signs (Most Recent):  Temp: 96 °F (35.6 °C) (11/13/22 0828)  Pulse: 97 (11/13/22 1149)  Resp: 18 (11/13/22 0828)  BP: (!) 155/75 (11/13/22 0828)  SpO2: 95 % (11/13/22 0828)   Vital Signs (24h Range):  Temp:  [96 °F (35.6 °C)-98.8 °F (37.1 °C)] 96 °F (35.6 °C)  Pulse:  [75-97] 97  Resp:  [18-20] 18  SpO2:  [94 %-97 %] 95 %  BP: (116-155)/(60-75) 155/75     Weight: 74.4 kg (164 lb)  Body mass index is 23.53 kg/m².    Intake/Output Summary (Last 24 hours) at 11/13/2022 1207  Last data filed at 11/13/2022 1111  Gross per 24  hour   Intake 360 ml   Output 3150 ml   Net -2790 ml      Physical Exam  Constitutional:       Appearance: Normal appearance.   HENT:      Head: Normocephalic and atraumatic.      Nose: Nose normal.      Mouth/Throat:      Mouth: Mucous membranes are dry.   Eyes:      Extraocular Movements: Extraocular movements intact.      Pupils: Pupils are equal, round, and reactive to light.   Cardiovascular:      Rate and Rhythm: Normal rate. Rhythm irregular.      Pulses: Normal pulses.      Heart sounds: Normal heart sounds.   Pulmonary:      Effort: Pulmonary effort is normal.      Breath sounds: Normal breath sounds.   Abdominal:      General: Bowel sounds are normal.      Palpations: Abdomen is soft.   Genitourinary:     Comments: deferred  Musculoskeletal:         General: Normal range of motion.      Cervical back: Normal range of motion and neck supple.   Skin:     General: Skin is warm and dry.      Capillary Refill: Capillary refill takes less than 2 seconds.   Neurological:      Mental Status: He is alert and oriented to person, place, and time.   Psychiatric:         Mood and Affect: Mood normal.       Significant Labs:   Recent Labs   Lab 11/12/22 0112 11/12/22 0824 11/13/22  0232   WBC 7.07 6.25 6.51   HGB 6.4* 7.4* 7.6*   HCT 19.4* 22.1* 22.6*    148* 164   MONO 7.2  0.5  --  10.6  0.7     Recent Labs   Lab 11/12/22 0112 11/12/22 0824 11/13/22  0232    138 140   K 3.2* 3.4* 2.7*    108 104   CO2 13* 21* 19*   BUN 32* 35* 39*   CREATININE 2.5* 2.7* 2.6*   CALCIUM 8.5* 9.0 9.3   PROT 6.2  --  6.5   BILITOT 0.5  --  0.6   ALKPHOS 58  --  64   ALT 15  --  13   AST 23  --  18     Microbiology Results (last 7 days)       Procedure Component Value Units Date/Time    Blood culture #2 **CANNOT BE ORDERED STAT** [[id number]] Collected: 11/12/22 0144    Order Status: Completed Specimen: Blood from Peripheral, Upper Arm, Left Updated: 11/13/22 1012     Blood Culture, Routine No Growth to date      No  Growth to date    Blood culture #1 **CANNOT BE ORDERED STAT** [[id number]] Collected: 11/12/22 0145    Order Status: Completed Specimen: Blood from Peripheral, Hand, Right Updated: 11/13/22 1012     Blood Culture, Routine No Growth to date      No Growth to date              Significant Imaging: I have reviewed all pertinent imaging results/findings within the past 24 hours.      Assessment/Plan:      * Anemia due to stage 4 chronic kidney disease  - Hgb 6.4 from baseline 8-9; no signs of bleeding  - Likely 2/2 CKD Stage 4. Has required transfusions in the past and sees hematology for IV iron  - Start 1U pRBC; consents signed  - Outpatient hematology to eval restarting IV iron and +/- EPO  - Repeat CBC in AM  - Monitor for signs of bleeding  - Will continue DAPT has benefits > risks at this point in time      Baseline and stable  Needs CSF or OP infusions  Defer to renal    Paroxysmal atrial fibrillation  Patient with Persistent (7 days or more) atrial fibrillation which is controlled currently with Beta Blocker. Patient is currently in atrial fibrillation.[id number] Score: 1. Anticoagulation not indicated due to intolerance.    Cards plans for DCCV in am  NPO after MN    Chest pain  - Constant and stabbing in nature. Troponin negative x 1  - Etiology less likely ACS. Could be due to anemia and pulm edema  - Repeat 2nd troponin, ECG PRN  - S/p IV Lasix  - Will repeat diuretic dosing  - Telemetry        Resolved      COPD (chronic obstructive pulmonary disease)  - duonebs q6h      CKD (chronic kidney disease) stage 4, GFR 15-29 ml/min  - Baseline sCr 2.5-3; at baseline now  - pRBC transfusion as above  - Avoid further nephrotoxic agents  - IV diuretics as above  - Recommend outpatient nephrology    Will need OP nephrology  He tells me he doesn't have one anymore  Refer to nephrology      Hypokalemia  - Chronic issue despite being on MRA and having significant CKD  - Could be RTA?  - Will give 50 meq KCl x 1  - Repeat  BMP in AM  - Telemetry for arrhythmia  - Will be careful with K replacement as risk factors for hyperkalemia    Refractory  Magnesium normal  Likely due to aggressive diuresis  Replete  BMP, Mg in am      Coronary artery disease involving native coronary artery of native heart without angina pectoris  - Continue ASA, plavix, lipitor  - Coreg 50mg BID  - Chest pain management as above      Atherosclerosis of native artery of both lower extremities with intermittent claudication  - Continue DAPT, statin      Hyperlipidemia  - Lipitor 40mg in exchange for home crestor as not on formulary      HTN (hypertension)  - Continue coreg, eplerenone, and procardia        VTE Risk Mitigation (From admission, onward)         Ordered     IP VTE LOW RISK PATIENT  Once         11/12/22 0259     Place sequential compression device  Until discontinued         11/12/22 0259                Discharge Planning   FLACO:      Code Status: Full Code   Is the patient medically ready for discharge?:     Reason for patient still in hospital (select all that apply): Patient trending condition, Treatment and Consult recommendations                     Raissa Peres NP  Department of Hospital Medicine   [facility] - Telemetry

## 2022-11-13 NOTE — SUBJECTIVE & OBJECTIVE
Interval History:   Denies chest pain  Denies dyspnea  Cards note reviewed  Plans for DCCV in am  Hypokalemia note, replacement underway  NPO after MN    Review of Systems   Constitutional:  Positive for activity change.   HENT: Negative.     Eyes: Negative.    Respiratory: Negative.     Cardiovascular: Negative.    Gastrointestinal: Negative.    Endocrine: Negative.    Genitourinary: Negative.    Musculoskeletal: Negative.    Skin: Negative.    Allergic/Immunologic: Negative.    Neurological: Negative.    Hematological: Negative.    Psychiatric/Behavioral: Negative.     Objective:     Vital Signs (Most Recent):  Temp: 96 °F (35.6 °C) (11/13/22 0828)  Pulse: 97 (11/13/22 1149)  Resp: 18 (11/13/22 0828)  BP: (!) 155/75 (11/13/22 0828)  SpO2: 95 % (11/13/22 0828)   Vital Signs (24h Range):  Temp:  [96 °F (35.6 °C)-98.8 °F (37.1 °C)] 96 °F (35.6 °C)  Pulse:  [75-97] 97  Resp:  [18-20] 18  SpO2:  [94 %-97 %] 95 %  BP: (116-155)/(60-75) 155/75     Weight: 74.4 kg (164 lb)  Body mass index is 23.53 kg/m².    Intake/Output Summary (Last 24 hours) at 11/13/2022 1207  Last data filed at 11/13/2022 1111  Gross per 24 hour   Intake 360 ml   Output 3150 ml   Net -2790 ml      Physical Exam  Constitutional:       Appearance: Normal appearance.   HENT:      Head: Normocephalic and atraumatic.      Nose: Nose normal.      Mouth/Throat:      Mouth: Mucous membranes are dry.   Eyes:      Extraocular Movements: Extraocular movements intact.      Pupils: Pupils are equal, round, and reactive to light.   Cardiovascular:      Rate and Rhythm: Normal rate. Rhythm irregular.      Pulses: Normal pulses.      Heart sounds: Normal heart sounds.   Pulmonary:      Effort: Pulmonary effort is normal.      Breath sounds: Normal breath sounds.   Abdominal:      General: Bowel sounds are normal.      Palpations: Abdomen is soft.   Genitourinary:     Comments: deferred  Musculoskeletal:         General: Normal range of motion.      Cervical back:  Normal range of motion and neck supple.   Skin:     General: Skin is warm and dry.      Capillary Refill: Capillary refill takes less than 2 seconds.   Neurological:      Mental Status: He is alert and oriented to person, place, and time.   Psychiatric:         Mood and Affect: Mood normal.       Significant Labs:   Recent Labs   Lab 11/12/22 0112 11/12/22 0824 11/13/22 0232   WBC 7.07 6.25 6.51   HGB 6.4* 7.4* 7.6*   HCT 19.4* 22.1* 22.6*    148* 164   MONO 7.2  0.5  --  10.6  0.7     Recent Labs   Lab 11/12/22 0112 11/12/22 0824 11/13/22 0232    138 140   K 3.2* 3.4* 2.7*    108 104   CO2 13* 21* 19*   BUN 32* 35* 39*   CREATININE 2.5* 2.7* 2.6*   CALCIUM 8.5* 9.0 9.3   PROT 6.2  --  6.5   BILITOT 0.5  --  0.6   ALKPHOS 58  --  64   ALT 15  --  13   AST 23  --  18     Microbiology Results (last 7 days)       Procedure Component Value Units Date/Time    Blood culture #2 **CANNOT BE ORDERED STAT** [[id number]] Collected: 11/12/22 0144    Order Status: Completed Specimen: Blood from Peripheral, Upper Arm, Left Updated: 11/13/22 1012     Blood Culture, Routine No Growth to date      No Growth to date    Blood culture #1 **CANNOT BE ORDERED STAT** [[id number]] Collected: 11/12/22 0145    Order Status: Completed Specimen: Blood from Peripheral, Hand, Right Updated: 11/13/22 1012     Blood Culture, Routine No Growth to date      No Growth to date              Significant Imaging: I have reviewed all pertinent imaging results/findings within the past 24 hours.

## 2022-11-13 NOTE — ASSESSMENT & PLAN NOTE
He does have history of intervention to the lower extremities.  He is claudication free at this time.

## 2022-11-13 NOTE — ASSESSMENT & PLAN NOTE
It appears that this is his 2nd episode of AFib.  Likely his tachycardia related to AFib in setting of anemia triggered angina.  He is not a candidate for DOAC therapy.    I recommend a transesophageal echo guided cardioversion tomorrow.

## 2022-11-13 NOTE — ASSESSMENT & PLAN NOTE
- Hgb 6.4 from baseline 8-9; no signs of bleeding  - Likely 2/2 CKD Stage 4. Has required transfusions in the past and sees hematology for IV iron  - Start 1U pRBC; consents signed  - Outpatient hematology to eval restarting IV iron and +/- EPO  - Repeat CBC in AM  - Monitor for signs of bleeding  - Will continue DAPT has benefits > risks at this point in time      Baseline and stable  Needs CSF or OP infusions  Defer to renal

## 2022-11-13 NOTE — HPI
11/13/2022: He presented to the ED with chest pain and shortness of breath Friday night.  AFib was detected.  His hemoglobin was 6.4.  He has received transfusion.  He is without chest pain or shortness of breath currently.  He did not know he was in AFib.  He had 1 episode of AFib a couple of years ago but has never required cardioversion.  He is intolerant to DOAC treatment.  He is on aspirin and Plavix.    He has history of coronary disease with stenting to LAD 2019 as well as coronary artery at that time.  He has previous stent to circumflex also in 2019.  He had cardiac arrest surrounding his ACS presentation at that time.  There was 1 episode of AFib documented as per the patient at that time.  He has CKD stage 4.  His current labs confirm BNP 3 3 9, troponin 0.043 ng/mL.  His Electrocardiogram does not show marked ST changes.  He is feeling better and states he thinks he could go home today.  He has persistent AFib on telemetry.    The patient has had previous intervention to both lower extremities.  He has been free of claudication.  He is compliant with medications.

## 2022-11-13 NOTE — CONSULTS
[facility] - Telemetry  Cardiology  Consult Note    Patient Name: Domingo Gross  MRN: [id number]  Admission Date: 11/12/2022  Hospital Length of Stay: 0 days  Code Status: Full Code   Attending Provider: Pawan Garcia, *   Consulting Provider: Dedrick Vines MD  Primary Care Physician: Analy Encarnacion MD  Principal Problem:Anemia due to stage 4 chronic kidney disease    Patient information was obtained from patient, past medical records, ER records and primary team.     Consults  Subjective:     Chief Complaint:  Chest pain shortness of breath.    HPI:   11/13/2022: He presented to the ED with chest pain and shortness of breath Friday night.  AFib was detected.  His hemoglobin was 6.4.  He has received transfusion.  He is without chest pain or shortness of breath currently.  He did not know he was in AFib.  He had 1 episode of AFib a couple of years ago but has never required cardioversion.  He is intolerant to DOAC treatment.  He is on aspirin and Plavix.    He has history of coronary disease with stenting to LAD 2019 as well as coronary artery at that time.  He has previous stent to circumflex also in 2019.  He had cardiac arrest surrounding his ACS presentation at that time.  There was 1 episode of AFib documented as per the patient at that time.  He has CKD stage 4.  His current labs confirm BNP 3 3 9, troponin 0.043 ng/mL.  His Electrocardiogram does not show marked ST changes.  He is feeling better and states he thinks he could go home today.  He has persistent AFib on telemetry.    The patient has had previous intervention to both lower extremities.  He has been free of claudication.  He is compliant with medications.      Past Medical History:   Diagnosis Date    Allergy     Anemia     Anticoagulant long-term use     Arthritis     CKD (chronic kidney disease) stage 4, GFR 15-29 ml/min     COPD (chronic obstructive pulmonary disease) 08/20/2021    Coronary artery disease     Heart attack  10/04/2019    Hematuria     Hemothorax     Hyperlipidemia     Hypertension     Hyperuricemia     Hypocalcemia     Hypokalemia     Hypophosphatemia     PAD (peripheral artery disease)     Proteinuria     Vitamin D deficiency        Past Surgical History:   Procedure Laterality Date    ABDOMINAL AORTOGRAPHY N/A 5/10/2019    Procedure: AORTOGRAM-ABDOMINAL;  Surgeon: Keo Jenkins MD;  Location: [facility] CATH LAB/EP;  Service: Cardiology;  Laterality: N/A;  RSFA intervention     AORTOGRAPHY WITH SERIALOGRAPHY N/A 12/3/2021    Procedure: AORTOGRAM, WITH SERIALOGRAPHY;  Surgeon: Keo Jenkins MD;  Location: [facility] CATH LAB/EP;  Service: Cardiology;  Laterality: N/A;    AORTOGRAPHY WITH SERIALOGRAPHY N/A 4/1/2022    Procedure: AORTOGRAM, WITH SERIALOGRAPHY;  Surgeon: Keo Jenkins MD;  Location: [facility] CATH LAB/EP;  Service: Cardiology;  Laterality: N/A;    BONE MARROW BIOPSY Right 10/12/2021    Procedure: BIOPSY-BONE MARROW;  Surgeon: Naseem Woods MD;  Location: [facility] OR;  Service: Oncology;  Laterality: Right;    CARDIAC CATHETERIZATION      CATARACT EXTRACTION      CATARACT EXTRACTION W/  INTRAOCULAR LENS IMPLANT Right 6/8/2020    Procedure: EXTRACTION, CATARACT, WITH IOL INSERTION;  Surgeon: Tin Light MD;  Location: [facility] [place], operated by [facility] OR;  Service: Ophthalmology;  Laterality: Right;    CATARACT EXTRACTION W/  INTRAOCULAR LENS IMPLANT Left 7/2/2020    Procedure: EXTRACTION, CATARACT, WITH IOL INSERTION;  Surgeon: Tin Light MD;  Location: [facility] [place], operated [facility] OR;  Service: Ophthalmology;  Laterality: Left;    COLONOSCOPY N/A 1/6/2022    Procedure: COLONOSCOPY;  Surgeon: Kathe Penaloza MD;  Location: [facility] ENDO [address]);  Service: Endoscopy;  Laterality: N/A;  COVID test at [facility] on 1/3-GT  okay to hold Plavix for 5 days and aspirin per Dr. Becerra  2nd floor due toextensive cardiac history   instructions mailed and my [facility] portal -     CORONARY ANGIOGRAPHY N/A 3/29/2019    Procedure: ANGIOGRAM, CORONARY  ARTERY;  Surgeon: Keo Jenkins MD;  Location: [facility] CATH LAB/EP;  Service: Cardiology;  Laterality: N/A;    CORONARY ANGIOGRAPHY Left 10/11/2019    Procedure: ANGIOGRAM, CORONARY ARTERY;  Surgeon: Keo Jenkins MD;  Location: [facility] CATH LAB/EP;  Service: Cardiology;  Laterality: Left;    CORONARY ANGIOPLASTY WITH STENT PLACEMENT  03/29/2019    mid and distal RCA    ESOPHAGOGASTRODUODENOSCOPY N/A 1/6/2022    Procedure: EGD (ESOPHAGOGASTRODUODENOSCOPY);  Surgeon: Kathe Penaloza MD;  Location: [facility] ENDO ([address] [place] [address]);  Service: Endoscopy;  Laterality: N/A;    EYE SURGERY      INTRAVASCULAR ULTRASOUND, NON-CORONARY  8/12/2022    Procedure: Intravascular Ultrasound, Non-Coronary;  Surgeon: Keo Jenkins MD;  Location: [facility] CATH LAB/EP;  Service: Cardiology;;    LEFT HEART CATHETERIZATION N/A 3/29/2019    Procedure: Left heart cath;  Surgeon: Keo Jenkins MD;  Location: [facility] CATH LAB/EP;  Service: Cardiology;  Laterality: N/A;    LEFT HEART CATHETERIZATION Left 10/8/2019    Procedure: Left heart cath;  Surgeon: Keo Jenkins MD;  Location: [facility] CATH LAB/EP;  Service: Cardiology;  Laterality: Left;    PERCUTANEOUS TRANSLUMINAL ANGIOPLASTY (PTA) OF PERIPHERAL VESSEL N/A 7/12/2019    Procedure: PTA, PERIPHERAL VESSEL;  Surgeon: Keo Jenkins MD;  Location: [facility] CATH LAB/EP;  Service: Cardiology;  Laterality: N/A;    PERCUTANEOUS TRANSLUMINAL ANGIOPLASTY (PTA) OF PERIPHERAL VESSEL Left 5/20/2022    Procedure: PTA, PERIPHERAL VESSEL;  Surgeon: Keo Jenkins MD;  Location: [facility] CATH LAB/EP;  Service: Cardiology;  Laterality: Left;    PERCUTANEOUS TRANSLUMINAL ANGIOPLASTY (PTA) OF PERIPHERAL VESSEL Right 8/12/2022    Procedure: PTA, PERIPHERAL VESSEL;  Surgeon: Keo Jenkins MD;  Location: [facility] CATH LAB/EP;  Service: Cardiology;  Laterality: Right;       Review of patient's allergies indicates:   Allergen Reactions    Ace inhibitors Rash       No current facility-administered medications on file prior to  encounter.     Current Outpatient Medications on File Prior to Encounter   Medication Sig    albuterol (VENTOLIN HFA) 90 mcg/actuation inhaler Inhale 2 puffs into the lungs every 6 (six) hours as needed for Wheezing. Rescue    ASPIRIN (ASPIR-81 ORAL) Take 81 mg by mouth once daily.    carvediloL (COREG) 25 MG tablet Take 2 tablets (50 mg total) by mouth 2 (two) times daily with meals.    clopidogreL (PLAVIX) 75 mg tablet Take 1 tablet (75 mg total) by mouth once daily.    coenzyme Q10 100 mg capsule Take 100 mg by mouth once daily.    eplerenone (INSPRA) 50 MG Tab Take 1 tablet (50 mg total) by mouth once daily.    hydrALAZINE (APRESOLINE) 100 MG tablet Take 1 tablet (100 mg total) by mouth 2 (two) times a day.    potassium chloride SA (K-DUR,KLOR-CON) 20 MEQ tablet Take 2 tablets (40 mEq total) by mouth 2 (two) times daily.    rosuvastatin (CRESTOR) 40 MG Tab Take 1 tablet (40 mg total) by mouth every evening.    mupirocin (BACTROBAN) 2 % ointment Apply topically 3 (three) times daily. (Patient not taking: Reported on 2022)     Family History       Problem Relation (Age of Onset)    Aneurysm Mother    Cancer Father    Diabetes Sister    Heart disease Mother    Hypertension Mother, Sister    No Known Problems Brother, Son          Tobacco Use    Smoking status: Former     Types: Cigarettes     Quit date: 1999     Years since quittin.5    Smokeless tobacco: Never   Substance and Sexual Activity    Alcohol use: No     Alcohol/week: 0.0 standard drinks    Drug use: No    Sexual activity: Yes     Partners: Female     Review of Systems   Constitutional: Negative for chills, decreased appetite, diaphoresis, fever, malaise/fatigue, night sweats, weight gain and weight loss.   HENT:  Negative for congestion, ear discharge, ear pain, hearing loss, hoarse voice, nosebleeds, odynophagia, sore throat, stridor and tinnitus.    Eyes:  Negative for blurred vision, discharge, double vision, pain,  photophobia, redness, vision loss in left eye, vision loss in right eye, visual disturbance and visual halos.   Cardiovascular:  Positive for chest pain and dyspnea on exertion. Negative for claudication, cyanosis, irregular heartbeat, leg swelling, near-syncope, orthopnea, palpitations, paroxysmal nocturnal dyspnea and syncope.   Respiratory:  Positive for shortness of breath. Negative for cough, hemoptysis, sleep disturbances due to breathing, snoring, sputum production and wheezing.    Endocrine: Negative for cold intolerance, heat intolerance, polydipsia, polyphagia and polyuria.   Hematologic/Lymphatic: Negative for adenopathy and bleeding problem. Does not bruise/bleed easily.   Skin:  Negative for color change, dry skin, flushing, itching, nail changes, poor wound healing, rash, skin cancer, suspicious lesions and unusual hair distribution.   Musculoskeletal:  Negative for arthritis, back pain, falls, gout, joint pain, joint swelling, muscle cramps, muscle weakness, myalgias, neck pain and stiffness.   Gastrointestinal:  Negative for bloating, abdominal pain, anorexia, change in bowel habit, bowel incontinence, constipation, diarrhea, dysphagia, excessive appetite, flatus, heartburn, hematemesis, hematochezia, hemorrhoids, jaundice, melena, nausea and vomiting.   Genitourinary:  Negative for bladder incontinence, decreased libido, dysuria, flank pain, frequency, genital sores, hematuria, hesitancy, incomplete emptying, nocturia and urgency.   Neurological:  Negative for aphonia, brief paralysis, difficulty with concentration, disturbances in coordination, excessive daytime sleepiness, dizziness, focal weakness, headaches, light-headedness, loss of balance, numbness, paresthesias, seizures, sensory change, tremors, vertigo and weakness.   Psychiatric/Behavioral:  Negative for altered mental status, depression, hallucinations, memory loss, substance abuse, suicidal ideas and thoughts of violence. The patient  does not have insomnia and is not nervous/anxious.    Allergic/Immunologic: Negative for hives and persistent infections.   Objective:     Vital Signs (Most Recent):  Temp: 96 °F (35.6 °C) (11/13/22 0828)  Pulse: 83 (11/13/22 0828)  Resp: 18 (11/13/22 0828)  BP: (!) 155/75 (11/13/22 0828)  SpO2: 95 % (11/13/22 0828)   Vital Signs (24h Range):  Temp:  [96 °F (35.6 °C)-98.8 °F (37.1 °C)] 96 °F (35.6 °C)  Pulse:  [75-95] 83  Resp:  [16-20] 18  SpO2:  [94 %-97 %] 95 %  BP: (116-155)/(60-75) 155/75     Weight: 74.4 kg (164 lb)  Body mass index is 23.53 kg/m².    SpO2: 95 %  O2 Device (Oxygen Therapy): room air      Intake/Output Summary (Last 24 hours) at 11/13/2022 1027  Last data filed at 11/13/2022 0540  Gross per 24 hour   Intake 360 ml   Output 2000 ml   Net -1640 ml       Lines/Drains/Airways       Peripheral Intravenous Line  Duration                  Peripheral IV - Single Lumen 11/12/22 0044 20 G Left Forearm 1 day         Peripheral IV - Single Lumen 11/12/22 0154 20 G Right Antecubital 1 day                    Physical Exam  Constitutional:       General: He is not in acute distress.     Appearance: He is well-developed. He is not diaphoretic.   HENT:      Head: Normocephalic and atraumatic.      Nose: Nose normal.   Eyes:      General: No scleral icterus.        Right eye: No discharge.      Conjunctiva/sclera: Conjunctivae normal.      Pupils: Pupils are equal, round, and reactive to light.   Neck:      Thyroid: No thyromegaly.      Vascular: No JVD.      Trachea: No tracheal deviation.   Cardiovascular:      Rate and Rhythm: Normal rate. Rhythm irregularly irregular.      Pulses:           Carotid pulses are 2+ on the right side and 2+ on the left side.       Radial pulses are 2+ on the right side and 2+ on the left side.        Dorsalis pedis pulses are 2+ on the right side and 2+ on the left side.        Posterior tibial pulses are 2+ on the right side and 2+ on the left side.      Heart sounds: Normal  heart sounds. No murmur heard.    No friction rub. No gallop.   Pulmonary:      Effort: Pulmonary effort is normal. No respiratory distress.      Breath sounds: Normal breath sounds. No stridor. No wheezing or rales.   Chest:      Chest wall: No tenderness.   Abdominal:      General: Bowel sounds are normal. There is no distension.      Palpations: Abdomen is soft. There is no mass.      Tenderness: There is no abdominal tenderness. There is no guarding or rebound.   Musculoskeletal:         General: No tenderness. Normal range of motion.      Cervical back: Normal range of motion and neck supple.   Lymphadenopathy:      Cervical: No cervical adenopathy.   Skin:     General: Skin is warm and dry.      Coloration: Skin is not pale.      Findings: No erythema or rash.   Neurological:      Mental Status: He is alert and oriented to person, place, and time.      Cranial Nerves: No cranial nerve deficit.      Coordination: Coordination normal.   Psychiatric:         Behavior: Behavior normal.         Thought Content: Thought content normal.         Judgment: Judgment normal.       Significant Labs: BMP:   Recent Labs   Lab 11/12/22 0112 11/12/22  0153 11/12/22 0824 11/13/22 0232   *  --  105 94     --  138 140   K 3.2*  --  3.4* 2.7*     --  108 104   CO2 13*  --  21* 19*   BUN 32*  --  35* 39*   CREATININE 2.5*  --  2.7* 2.6*   CALCIUM 8.5*  --  9.0 9.3   MG  --  1.7 1.8 1.6   , CMP   Recent Labs   Lab 11/12/22 0112 11/12/22 0824 11/13/22 0232    138 140   K 3.2* 3.4* 2.7*    108 104   CO2 13* 21* 19*   * 105 94   BUN 32* 35* 39*   CREATININE 2.5* 2.7* 2.6*   CALCIUM 8.5* 9.0 9.3   PROT 6.2  --  6.5   ALBUMIN 2.9*  --  3.0*   BILITOT 0.5  --  0.6   ALKPHOS 58  --  64   AST 23  --  18   ALT 15  --  13   ANIONGAP 14 9 17*   , CBC   Recent Labs   Lab 11/12/22  0112 11/12/22  0824 11/13/22  0232   WBC 7.07 6.25 6.51   HGB 6.4* 7.4* 7.6*   HCT 19.4* 22.1* 22.6*    148* 164    , INR No results for input(s): INR, PROTIME in the last 48 hours., Lipid Panel No results for input(s): CHOL, HDL, LDLCALC, TRIG, CHOLHDL in the last 48 hours., Troponin   Recent Labs   Lab 11/12/22  0112 11/12/22  0519   TROPONINI 0.011 0.012   , and All pertinent lab results from the last 24 hours have been reviewed.    Significant Imaging:     Assessment and Plan:     * Anemia due to stage 4 chronic kidney disease  He has been transfused during this admission for hemoglobin 6.4.    Paroxysmal atrial fibrillation  It appears that this is his 2nd episode of AFib.  Likely his tachycardia related to AFib in setting of anemia triggered angina.  He is not a candidate for DOAC therapy.    I recommend a transesophageal echo guided cardioversion tomorrow.    Chest pain  Possibly related to AFib.  He has not had chest pain symptoms until Friday night.  Currently his symptoms have resolved.    COPD (chronic obstructive pulmonary disease)  He quit smoking in the past.  Condition stable.    CKD (chronic kidney disease) stage 4, GFR 15-29 ml/min  Nephrology is following.  Condition unchanged.    Coronary artery disease involving native coronary artery of native heart without angina pectoris  His chest pain occurred in setting of severe anemia.  He remains on aspirin and Plavix.  He has had three-vessel stenting in the past.    Hyperlipidemia  LDL well controlled, 46 mg % pure hypercholesterolemia.  Continue current therapy.    Claudication in peripheral vascular disease  He does have history of intervention to the lower extremities.  He is claudication free at this time.    HTN (hypertension)  He does not monitor his blood pressures at home.  His current agents will be continued.  His readings are on the high side this morning.        VTE Risk Mitigation (From admission, onward)         Ordered     IP VTE LOW RISK PATIENT  Once         11/12/22 0259     Place sequential compression device  Until discontinued         11/12/22  0259              My recommendation is for JOSE guided cardioversion tomorrow.        Thank you for your consult. I will follow-up with patient. Please contact us if you have any additional questions.    Dedrick Vines MD  Cardiology   [place] [facility]

## 2022-11-13 NOTE — PROGRESS NOTES
Progress Note  Nephrology      Consult Requested By: Pawan Garcia, *      SUBJECTIVE:     Overnight events  Patient is a 62 y.o. male     Patient Active Problem List   Diagnosis    HTN (hypertension)    Claudication in peripheral vascular disease    DJD (degenerative joint disease), lumbar    DDD (degenerative disc disease)    Hyperlipidemia    Atherosclerosis of native artery of both lower extremities with intermittent claudication    Coronary artery disease involving native coronary artery of native heart without angina pectoris    Bilateral carotid artery stenosis    Cardiac arrest    Hypokalemia    Hypoxia    Nephrotic range proteinuria    Nuclear sclerosis, bilateral    CKD (chronic kidney disease) stage 4, GFR 15-29 ml/min    COPD (chronic obstructive pulmonary disease)    Anemia due to stage 4 chronic kidney disease    Chest pain    Paroxysmal atrial fibrillation     Past Medical History:   Diagnosis Date    Allergy     Anemia     Anticoagulant long-term use     Arthritis     CKD (chronic kidney disease) stage 4, GFR 15-29 ml/min     COPD (chronic obstructive pulmonary disease) 08/20/2021    Coronary artery disease     Heart attack 10/04/2019    Hematuria     Hemothorax     Hyperlipidemia     Hypertension     Hyperuricemia     Hypocalcemia     Hypokalemia     Hypophosphatemia     PAD (peripheral artery disease)     Proteinuria     Vitamin D deficiency               OBJECTIVE:     Vitals:    11/13/22 0530 11/13/22 0755 11/13/22 0828 11/13/22 1149   BP: 118/60  (!) 155/75    BP Location: Left arm  Right arm    Patient Position: Lying  Lying    Pulse: 85 75 83 97   Resp: 18 20 18    Temp: 96.9 °F (36.1 °C)  96 °F (35.6 °C)    TempSrc: Oral  Oral    SpO2: 96% 97% 95%    Weight:       Height:           Temp: 96 °F (35.6 °C) (11/13/22 0828)  Pulse: 97 (11/13/22 1149)  Resp: 18 (11/13/22 0828)  BP: (!) 155/75 (11/13/22 0828)  SpO2: 95 % (11/13/22 0828)    Date 11/13/22 0700 - 11/14/22 0659   Shift  6509-4651 1108-6896 1998-8518 24 Hour Total   INTAKE   Shift Total(mL/kg)       OUTPUT   Urine(mL/kg/hr) 1150   1150   Shift Total(mL/kg) 1150(15.5)   1150(15.5)   Weight (kg) 74.4 74.4 74.4 74.4             Medications:   albuterol-ipratropium  3 mL Nebulization Q6H    aspirin  81 mg Oral Daily    atorvastatin  40 mg Oral Daily    carvediloL  25 mg Oral BID WM    clopidogreL  75 mg Oral Daily    furosemide (LASIX) injection  40 mg Intravenous Daily    potassium chloride  40 mEq Oral BID    spironolactone  50 mg Oral Daily                 Physical Exam:  General appearance:NAD  No SOB  No cough  No CP  Lungs: diminished breath sounds  Heart: pulse 97  Abdomen: soft  Extremities: no edema  Skin: dry    Laboratory:  ABG  Labs reviewed  Recent Results (from the past 336 hour(s))   Basic metabolic panel    Collection Time: 11/12/22  8:24 AM   Result Value Ref Range    Sodium 138 136 - 145 mmol/L    Potassium 3.4 (L) 3.5 - 5.1 mmol/L    Chloride 108 95 - 110 mmol/L    CO2 21 (L) 23 - 29 mmol/L    BUN 35 (H) 8 - 23 mg/dL    Creatinine 2.7 (H) 0.5 - 1.4 mg/dL    Calcium 9.0 8.7 - 10.5 mg/dL    Anion Gap 9 8 - 16 mmol/L   Basic Metabolic Panel    Collection Time: 11/07/22  9:09 AM   Result Value Ref Range    Sodium 138 136 - 145 mmol/L    Potassium 2.6 (LL) 3.5 - 5.1 mmol/L    Chloride 106 95 - 110 mmol/L    CO2 22 (L) 23 - 29 mmol/L    BUN 26 (H) 8 - 23 mg/dL    Creatinine 2.9 (H) 0.5 - 1.4 mg/dL    Calcium 8.8 8.7 - 10.5 mg/dL    Anion Gap 10 8 - 16 mmol/L   BASIC METABOLIC PANEL    Collection Time: 11/04/22  8:57 AM   Result Value Ref Range    Sodium 142 136 - 145 mmol/L    Potassium 2.4 (LL) 3.5 - 5.1 mmol/L    Chloride 107 95 - 110 mmol/L    CO2 21 (L) 23 - 29 mmol/L    BUN 25 (H) 8 - 23 mg/dL    Creatinine 3.2 (H) 0.5 - 1.4 mg/dL    Calcium 8.6 (L) 8.7 - 10.5 mg/dL    Anion Gap 14 8 - 16 mmol/L     Recent Results (from the past 336 hour(s))   CBC Auto Differential    Collection Time: 11/13/22  2:32 AM   Result Value  Ref Range    WBC 6.51 3.90 - 12.70 K/uL    Hemoglobin 7.6 (L) 14.0 - 18.0 g/dL    Hematocrit 22.6 (L) 40.0 - 54.0 %    Platelets 164 150 - 450 K/uL   CBC auto differential    Collection Time: 11/12/22  1:12 AM   Result Value Ref Range    WBC 7.07 3.90 - 12.70 K/uL    Hemoglobin 6.4 (L) 14.0 - 18.0 g/dL    Hematocrit 19.4 (LL) 40.0 - 54.0 %    Platelets 153 150 - 450 K/uL     Urinalysis  No results for input(s): COLORU, CLARITYU, SPECGRAV, PHUR, PROTEINUA, GLUCOSEU, BILIRUBINCON, BLOODU, WBCU, RBCU, BACTERIA, MUCUS, NITRITE, LEUKOCYTESUR, UROBILINOGEN, HYALINECASTS in the last 24 hours.    Diagnostic Results:  X-Ray: Reviewed  US: Reviewed  Echo: Reviewed  ACCESS    ASSESSMENT/PLAN:   BRETT/ CKD 4  CAD  HTN  PVD  2nd episode of AFib.  Possible transesophageal echo guided cardioversion tomorrow    UO 2900 cc  UA protein 3+  Nephrotic range proteinuria (2020)  2021 - no monoclonal peaks  UA protein/creatinine 2.21 in 2022  Kidney biopsy was not done in 2020 ( patient was  on plavix, aspirin)  US 2020  The right kidney measures 11.8 cm. The left kidney measures 11.7 cm.  There is equivocal mild increased echogenicity of the renal parenchyma bilaterally.  No solid or cystic masses. No hydronephrosis.The bladder is unremarkable in appearance.  Right kidney RI 0.70.  Left kidney RI 0.71.  Splenic RI of 0.58.  Creatinine 2.6  GFR 27 cc/ min  BUN 39  Hypokalemia 2.7  Hypomagnesemia 1.6  Replace prn  Metabolic acidosis  Metabolic bone disease  Anemia multifactorial  Hb 7.6  Poor nutrition  Albumin 3  Renal diet  Blood pressure 155/75  EF 2021 - 55 %  Weight daily  I and O  Avoid hypotension, nephrotoxic agents, hypovolemia  Diuresis  BMP , phos and mag in am

## 2022-11-13 NOTE — HOSPITAL COURSE
He presented with dyspnea and activity intolerance + chest pain  Denied chest pain to me on initial visit the next day  Found on presentation to have bilateral pleural effusions, Rt > Lt  Also found to be in Afib reportedly with history of long standing A fib > 1 year (per note)  ; Trop normal  He was requiring nasal cannula oxygen to maintain adequate oxygenation, which was new  Diuresis initiated but carefully considering CKD  Echo requested  Cards consulted     Also found on presentation to have Hgb 6.4 (baseline 9.7-10.2)   Despite baseline as above, he has been dropping his Hgb since May 2022 with average Hgb from May to present (7.8-8.2)  He denies overt blood loss  He has known CKD which is likely etiology of anemia  MCV (84) & MCHC (33.5) normal  Ferritin in July 253  He received one unit PRBC with subsequent Hgb 7.4  Should likely start colony stimulating factors per renal known to him OP     Additionally, he was also found to have potassium of 2.6  Electrolytes repleted       Renal and cards consult pending    11/13: Feels well. Denies chest pain or continued dyspnea. Has been weaned to RA. I eager to discharge but after seen by cardiology it has come to light that his A fib is not chronic. He is intolerant of thinners. Cards recommending DCCV in am. NPO after MN.     11/14: JOSE with DCCV canceled to allow for scheduled time off Plavix and ASA. Cardiology cleared him for discharge. He cannot take NOAC due to profound anemia in the setting of NOAC usage. His afib has CVR. He is stable and will remain on ASA and Plavix and follow up with cardiology known to him for scheduled DCCV.

## 2022-11-13 NOTE — NURSING

## 2022-11-13 NOTE — ASSESSMENT & PLAN NOTE
He does not monitor his blood pressures at home.  His current agents will be continued.  His readings are on the high side this morning.

## 2022-11-13 NOTE — PLAN OF CARE
Problem: Adult Inpatient Plan of Care  Goal: Plan of Care Review  Outcome: Ongoing, Progressing     Plan of care reviewed with patient, understanding verbalized. Pt is AAO x 4, on room air, denies SOB and pain, no distress noted. Pt is controlled Afib on Tele, no true red alarms. All medications administered as prescribed. Pt had critical K of 2.7, NP notified, meds ordered and administered. Pt is currently resting, bed in lowest position, HOB elevated, bed alarm activated, call light and bedside table within reach. Pt instructed to call if assistance is needed.

## 2022-11-13 NOTE — ASSESSMENT & PLAN NOTE
Possibly related to AFib.  He has not had chest pain symptoms until Friday night.  Currently his symptoms have resolved.

## 2022-11-14 ENCOUNTER — PATIENT MESSAGE (OUTPATIENT)
Dept: CARDIOLOGY | Facility: CLINIC | Age: 62
End: 2022-11-14
Payer: MEDICAID

## 2022-11-14 ENCOUNTER — TELEPHONE (OUTPATIENT)
Dept: CARDIOLOGY | Facility: CLINIC | Age: 62
End: 2022-11-14
Payer: MEDICAID

## 2022-11-14 VITALS
HEIGHT: 70 IN | BODY MASS INDEX: 23.48 KG/M2 | OXYGEN SATURATION: 100 % | WEIGHT: 164 LBS | RESPIRATION RATE: 18 BRPM | HEART RATE: 82 BPM | TEMPERATURE: 96 F | DIASTOLIC BLOOD PRESSURE: 83 MMHG | SYSTOLIC BLOOD PRESSURE: 132 MMHG

## 2022-11-14 PROBLEM — R07.9 CHEST PAIN: Status: RESOLVED | Noted: 2022-11-12 | Resolved: 2022-11-14

## 2022-11-14 LAB
ALBUMIN SERPL BCP-MCNC: 2.9 G/DL (ref 3.5–5.2)
ALBUMIN/CREAT UR: 4673.9 UG/MG (ref 0–30)
ALP SERPL-CCNC: 65 U/L (ref 55–135)
ALT SERPL W/O P-5'-P-CCNC: 14 U/L (ref 10–44)
ANION GAP SERPL CALC-SCNC: 12 MMOL/L (ref 8–16)
AST SERPL-CCNC: 18 U/L (ref 10–40)
BACTERIA #/AREA URNS HPF: ABNORMAL /HPF
BASOPHILS # BLD AUTO: 0.04 K/UL (ref 0–0.2)
BASOPHILS NFR BLD: 0.6 % (ref 0–1.9)
BILIRUB SERPL-MCNC: 0.6 MG/DL (ref 0.1–1)
BILIRUB UR QL STRIP: NEGATIVE
BUN SERPL-MCNC: 43 MG/DL (ref 8–23)
CALCIUM SERPL-MCNC: 9.1 MG/DL (ref 8.7–10.5)
CHLORIDE SERPL-SCNC: 102 MMOL/L (ref 95–110)
CLARITY UR: CLEAR
CO2 SERPL-SCNC: 24 MMOL/L (ref 23–29)
COLOR UR: COLORLESS
CREAT SERPL-MCNC: 2.8 MG/DL (ref 0.5–1.4)
CREAT UR-MCNC: 46 MG/DL (ref 23–375)
DIFFERENTIAL METHOD: ABNORMAL
EOSINOPHIL # BLD AUTO: 0.2 K/UL (ref 0–0.5)
EOSINOPHIL NFR BLD: 2.8 % (ref 0–8)
ERYTHROCYTE [DISTWIDTH] IN BLOOD BY AUTOMATED COUNT: 13.1 % (ref 11.5–14.5)
EST. GFR  (NO RACE VARIABLE): 25 ML/MIN/1.73 M^2
GLUCOSE SERPL-MCNC: 92 MG/DL (ref 70–110)
GLUCOSE UR QL STRIP: NEGATIVE
GRAN CASTS #/AREA URNS LPF: 8 /LPF
HCT VFR BLD AUTO: 26 % (ref 40–54)
HGB BLD-MCNC: 9 G/DL (ref 14–18)
HGB UR QL STRIP: ABNORMAL
HYALINE CASTS #/AREA URNS LPF: 12 /LPF
IMM GRANULOCYTES # BLD AUTO: 0.01 K/UL (ref 0–0.04)
IMM GRANULOCYTES NFR BLD AUTO: 0.2 % (ref 0–0.5)
INTERPRETATION SERPL IFE-IMP: NORMAL
KETONES UR QL STRIP: NEGATIVE
LEUKOCYTE ESTERASE UR QL STRIP: NEGATIVE
LYMPHOCYTES # BLD AUTO: 1.6 K/UL (ref 1–4.8)
LYMPHOCYTES NFR BLD: 25.2 % (ref 18–48)
MAGNESIUM SERPL-MCNC: 1.7 MG/DL (ref 1.6–2.6)
MCH RBC QN AUTO: 28.1 PG (ref 27–31)
MCHC RBC AUTO-ENTMCNC: 34.6 G/DL (ref 32–36)
MCV RBC AUTO: 81 FL (ref 82–98)
MICROALBUMIN UR DL<=1MG/L-MCNC: 2150 UG/ML
MICROSCOPIC COMMENT: ABNORMAL
MONOCYTES # BLD AUTO: 0.6 K/UL (ref 0.3–1)
MONOCYTES NFR BLD: 9.3 % (ref 4–15)
NEUTROPHILS # BLD AUTO: 4 K/UL (ref 1.8–7.7)
NEUTROPHILS NFR BLD: 61.9 % (ref 38–73)
NITRITE UR QL STRIP: NEGATIVE
NRBC BLD-RTO: 0 /100 WBC
PH UR STRIP: 6 [PH] (ref 5–8)
PLATELET # BLD AUTO: 188 K/UL (ref 150–450)
PMV BLD AUTO: 10.1 FL (ref 9.2–12.9)
POTASSIUM SERPL-SCNC: 3 MMOL/L (ref 3.5–5.1)
POTASSIUM SERPL-SCNC: 3.5 MMOL/L (ref 3.5–5.1)
PROT SERPL-MCNC: 6.6 G/DL (ref 6–8.4)
PROT UR QL STRIP: ABNORMAL
RBC # BLD AUTO: 3.2 M/UL (ref 4.6–6.2)
RBC #/AREA URNS HPF: 1 /HPF (ref 0–4)
SODIUM SERPL-SCNC: 138 MMOL/L (ref 136–145)
SP GR UR STRIP: 1.01 (ref 1–1.03)
SQUAMOUS #/AREA URNS HPF: 0 /HPF
URN SPEC COLLECT METH UR: ABNORMAL
UROBILINOGEN UR STRIP-ACNC: NEGATIVE EU/DL
WBC # BLD AUTO: 6.44 K/UL (ref 3.9–12.7)
WBC #/AREA URNS HPF: 1 /HPF (ref 0–5)

## 2022-11-14 PROCEDURE — 94761 N-INVAS EAR/PLS OXIMETRY MLT: CPT

## 2022-11-14 PROCEDURE — 82570 ASSAY OF URINE CREATININE: CPT | Performed by: INTERNAL MEDICINE

## 2022-11-14 PROCEDURE — 84132 ASSAY OF SERUM POTASSIUM: CPT | Performed by: INTERNAL MEDICINE

## 2022-11-14 PROCEDURE — 25000242 PHARM REV CODE 250 ALT 637 W/ HCPCS: Performed by: INTERNAL MEDICINE

## 2022-11-14 PROCEDURE — 36415 COLL VENOUS BLD VENIPUNCTURE: CPT | Performed by: NURSE PRACTITIONER

## 2022-11-14 PROCEDURE — 25000003 PHARM REV CODE 250: Performed by: INTERNAL MEDICINE

## 2022-11-14 PROCEDURE — 63600175 PHARM REV CODE 636 W HCPCS: Performed by: INTERNAL MEDICINE

## 2022-11-14 PROCEDURE — 99499 NO LOS: ICD-10-PCS | Mod: ,,, | Performed by: INTERNAL MEDICINE

## 2022-11-14 PROCEDURE — 82043 UR ALBUMIN QUANTITATIVE: CPT | Performed by: INTERNAL MEDICINE

## 2022-11-14 PROCEDURE — 94640 AIRWAY INHALATION TREATMENT: CPT

## 2022-11-14 PROCEDURE — 85025 COMPLETE CBC W/AUTO DIFF WBC: CPT | Performed by: NURSE PRACTITIONER

## 2022-11-14 PROCEDURE — 25000003 PHARM REV CODE 250: Performed by: NURSE PRACTITIONER

## 2022-11-14 PROCEDURE — 99499 UNLISTED E&M SERVICE: CPT | Mod: ,,, | Performed by: INTERNAL MEDICINE

## 2022-11-14 PROCEDURE — 80053 COMPREHEN METABOLIC PANEL: CPT | Performed by: NURSE PRACTITIONER

## 2022-11-14 PROCEDURE — 81000 URINALYSIS NONAUTO W/SCOPE: CPT | Performed by: INTERNAL MEDICINE

## 2022-11-14 PROCEDURE — 83735 ASSAY OF MAGNESIUM: CPT | Performed by: NURSE PRACTITIONER

## 2022-11-14 PROCEDURE — 36415 COLL VENOUS BLD VENIPUNCTURE: CPT | Performed by: INTERNAL MEDICINE

## 2022-11-14 RX ORDER — POTASSIUM CHLORIDE 20 MEQ/1
40 TABLET, EXTENDED RELEASE ORAL 3 TIMES DAILY
Qty: 180 TABLET | Refills: 0 | Status: SHIPPED | OUTPATIENT
Start: 2022-11-14 | End: 2022-11-21 | Stop reason: SDUPTHER

## 2022-11-14 RX ORDER — FUROSEMIDE 40 MG/1
40 TABLET ORAL DAILY
Qty: 30 TABLET | Refills: 0 | Status: ON HOLD | OUTPATIENT
Start: 2022-11-14 | End: 2023-04-25 | Stop reason: HOSPADM

## 2022-11-14 RX ORDER — POTASSIUM CHLORIDE 20 MEQ/1
40 TABLET, EXTENDED RELEASE ORAL ONCE
Status: COMPLETED | OUTPATIENT
Start: 2022-11-14 | End: 2022-11-14

## 2022-11-14 RX ORDER — POTASSIUM CHLORIDE 20 MEQ/1
40 TABLET, EXTENDED RELEASE ORAL 3 TIMES DAILY
Status: DISCONTINUED | OUTPATIENT
Start: 2022-11-14 | End: 2022-11-14 | Stop reason: HOSPADM

## 2022-11-14 RX ORDER — LANOLIN ALCOHOL/MO/W.PET/CERES
400 CREAM (GRAM) TOPICAL 3 TIMES DAILY
Status: DISCONTINUED | OUTPATIENT
Start: 2022-11-14 | End: 2022-11-14 | Stop reason: HOSPADM

## 2022-11-14 RX ORDER — FUROSEMIDE 10 MG/ML
20 INJECTION INTRAMUSCULAR; INTRAVENOUS DAILY
Status: DISCONTINUED | OUTPATIENT
Start: 2022-11-14 | End: 2022-11-14 | Stop reason: HOSPADM

## 2022-11-14 RX ORDER — CARVEDILOL 25 MG/1
25 TABLET ORAL 2 TIMES DAILY WITH MEALS
Qty: 60 TABLET | Refills: 11 | Status: SHIPPED | OUTPATIENT
Start: 2022-11-14 | End: 2023-12-12 | Stop reason: SDUPTHER

## 2022-11-14 RX ORDER — SPIRONOLACTONE 25 MG/1
50 TABLET ORAL ONCE
Status: COMPLETED | OUTPATIENT
Start: 2022-11-14 | End: 2022-11-14

## 2022-11-14 RX ORDER — FUROSEMIDE 10 MG/ML
40 INJECTION INTRAMUSCULAR; INTRAVENOUS DAILY
Status: DISCONTINUED | OUTPATIENT
Start: 2022-11-14 | End: 2022-11-14

## 2022-11-14 RX ADMIN — POTASSIUM CHLORIDE 40 MEQ: 1500 TABLET, EXTENDED RELEASE ORAL at 03:11

## 2022-11-14 RX ADMIN — CARVEDILOL 25 MG: 25 TABLET, FILM COATED ORAL at 05:11

## 2022-11-14 RX ADMIN — MAGNESIUM OXIDE TAB 400 MG (241.3 MG ELEMENTAL MG) 400 MG: 400 (241.3 MG) TAB at 09:11

## 2022-11-14 RX ADMIN — POTASSIUM CHLORIDE 40 MEQ: 1500 TABLET, EXTENDED RELEASE ORAL at 09:11

## 2022-11-14 RX ADMIN — IPRATROPIUM BROMIDE AND ALBUTEROL SULFATE 3 ML: .5; 3 SOLUTION RESPIRATORY (INHALATION) at 07:11

## 2022-11-14 RX ADMIN — ASPIRIN 81 MG: 81 TABLET, COATED ORAL at 09:11

## 2022-11-14 RX ADMIN — IPRATROPIUM BROMIDE AND ALBUTEROL SULFATE 3 ML: .5; 3 SOLUTION RESPIRATORY (INHALATION) at 01:11

## 2022-11-14 RX ADMIN — SPIRONOLACTONE 50 MG: 25 TABLET, FILM COATED ORAL at 09:11

## 2022-11-14 RX ADMIN — CLOPIDOGREL BISULFATE 75 MG: 75 TABLET ORAL at 09:11

## 2022-11-14 RX ADMIN — FUROSEMIDE 20 MG: 10 INJECTION, SOLUTION INTRAMUSCULAR; INTRAVENOUS at 09:11

## 2022-11-14 RX ADMIN — IPRATROPIUM BROMIDE AND ALBUTEROL SULFATE 3 ML: .5; 3 SOLUTION RESPIRATORY (INHALATION) at 12:11

## 2022-11-14 RX ADMIN — ATORVASTATIN CALCIUM 40 MG: 40 TABLET, FILM COATED ORAL at 09:11

## 2022-11-14 RX ADMIN — MAGNESIUM OXIDE TAB 400 MG (241.3 MG ELEMENTAL MG) 400 MG: 400 (241.3 MG) TAB at 03:11

## 2022-11-14 RX ADMIN — SPIRONOLACTONE 50 MG: 25 TABLET ORAL at 05:11

## 2022-11-14 NOTE — PLAN OF CARE
Problem: Adult Inpatient Plan of Care  Goal: Plan of Care Review  Outcome: Ongoing, Progressing  Goal: Patient-Specific Goal (Individualized)  Outcome: Ongoing, Progressing  Goal: Absence of Hospital-Acquired Illness or Injury  Outcome: Ongoing, Progressing  Goal: Optimal Comfort and Wellbeing  Outcome: Ongoing, Progressing  Goal: Readiness for Transition of Care  Outcome: Ongoing, Progressing     Problem: COPD (Chronic Obstructive Pulmonary Disease) Comorbidity  Goal: Maintenance of COPD Symptom Control  Outcome: Ongoing, Progressing     Problem: Hypertension Comorbidity  Goal: Blood Pressure in Desired Range  Outcome: Ongoing, Progressing     Problem: Chest Pain  Goal: Resolution of Chest Pain Symptoms  Outcome: Ongoing, Progressing     Problem: ARDS (Acute Respiratory Distress Syndrome)  Goal: Effective Oxygenation  Outcome: Ongoing, Progressing     Problem: Anemia  Goal: Anemia Symptom Improvement  Outcome: Ongoing, Progressing     Problem: Fall Injury Risk  Goal: Absence of Fall and Fall-Related Injury  Outcome: Ongoing, Progressing

## 2022-11-14 NOTE — CONSULTS
[facility]  Cardiology  Consult Note    Patient Name: Domingo Gross  MRN: [id number]  Admission Date: 11/12/2022  Hospital Length of Stay: 1 days  Code Status: Inpatient consult to Cardiology-[facility]  Consult performed by: Karlos Zimmerman NP  Consult ordered by: Raissa Peres NP        Subjective:     Chief Complaint: See Consult by Dr Mohinder Zimmerman NP  Cardiology   [facility]

## 2022-11-14 NOTE — ASSESSMENT & PLAN NOTE
- Chronic issue despite being on MRA and having significant CKD  - Could be RTA?  - Will give 50 meq KCl x 1  - Repeat BMP in AM  - Telemetry for arrhythmia  - Will be careful with K replacement as risk factors for hyperkalemia    Refractory  Magnesium normal  Likely due to aggressive diuresis      Persistent and chronic  Continue home K dur but increase to TID  Close OP following  Refer to renal

## 2022-11-14 NOTE — NURSING
Patient returned to room 427 in bed with transporter on tele monitor due to case being cancelled and pts spouse was updated by phone; pt alert and in no distress and has no complaints; chart with pt

## 2022-11-14 NOTE — PLAN OF CARE
Pulse oximetry on room air is 99%. The proper method of use, as well as anticipated side effects, of this aerosol treatment are discussed and demonstrated to the patient.

## 2022-11-14 NOTE — ASSESSMENT & PLAN NOTE
Patient has a current diagnosis of HTN which is controlled.  Latest blood pressure and vitals reviewed-   Temp:  [96.3 °F (35.7 °C)-97.9 °F (36.6 °C)]   Pulse:  [69-95]   Resp:  [15-23]   BP: (116-146)/(58-92)   SpO2:  [98 %-100 %] .     Home meds for hypertension were reviewed and noted below.   Hypertension Medications             carvediloL (COREG) 25 MG tablet Take 1 tablet (25 mg total) by mouth 2 (two) times daily with meals.    eplerenone (INSPRA) 50 MG Tab Take 1 tablet (50 mg total) by mouth once daily.    furosemide (LASIX) 40 MG tablet Take 1 tablet (40 mg total) by mouth once daily.          Continue home meds

## 2022-11-14 NOTE — ASSESSMENT & PLAN NOTE
- Hgb 6.4 from baseline 8-9; no signs of bleeding  - Likely 2/2 CKD Stage 4. Has required transfusions in the past and sees hematology for IV iron  - Start 1U pRBC; consents signed  - Outpatient hematology to eval restarting IV iron and +/- EPO  - Repeat CBC in AM  - Monitor for signs of bleeding  - Will continue DAPT has benefits > risks at this point in time      Baseline and stable  Needs CSF or OP infusions  Defer to renal OP

## 2022-11-14 NOTE — PLAN OF CARE
Pt in pre procedure and prepared and questions answered   ht: 5 feet 9 inches, wt: 154 pounds, BMI: xx Kg/m2, IBW: 160 pounds (+/- 10%), 96% IBW. Edema: none noted; Skin: no pressure injuries.     Estimated calorie needs (8/13) for intubated pt per [employer] ([employer]U) 2003b: 1733cal/day (25cal/Kg per dosing wt 70Kg) ht: 5 feet 9 inches, wt: 154 pounds, BMI: 22.9Kg/m2, IBW: 160 pounds (+/- 10%), 96% IBW. Edema: none noted; Skin: no pressure injuries.     Estimated calorie needs (8/13) for intubated pt per [place] [employer] (PSU) 2003b: 1733cal/day (25cal/Kg per dosing wt 70Kg)

## 2022-11-14 NOTE — DISCHARGE SUMMARY
[facility] Medicine  Discharge Summary      Patient Name: Domingo Gross  MRN: [id number]  NAOMI: [id number]  Patient Class: IP- Inpatient  Admission Date: 11/12/2022  Hospital Length of Stay: 1 days  Discharge Date and Time:  11/14/2022 2:16 PM  Attending Physician: Pawan Garcia, *   Discharging Provider: Raissa Peres NP  Primary Care Provider: Analy Encarnacion MD    Primary Care Team: Networked reference to record PCT     HPI:   61 yo male with a PMHX of CAD (PCI Lcx and RCA), severe PAD s/p stents, CKD Stage 4 with chronic anemia, COPD, persistent Afib not on AC here for stabbing chest pain and SOB.    Patient is a poor historian. Difficult to obtain full story. It appears he started having substernal chest pain this afternoon that has been constant and stabbing, unrelated to exercise. He does have chest pain at baseline occasionally (angina?) but this is slightly different. Also noticed worsening SOB as of today, although he sounds NYHA IIIA at baseline. Denies any worsening orthopnea (sleeps flat), PND, palpitations, syncope, dizziness, melena, hematochezia, hematemesis. Does have generalized weakness and noted worsening leg edema.    In the ED, patient was noted to be Afib 80s with PVCs, SBP 100s. Labs with worsening Hgb to 6.4 from baseline 8-9 and sCr at baseline of 2.5.  with negative troponin. He was given IV Lasix 40mg x 1 with surprisingly OK UOP for now. 1U pRBC ordered and starting being transfused. Admitted to inpatient for further management.      Procedure(s) (LRB):  Transesophageal echo (JOSE) intra-procedure log documentation (N/A)      Hospital Course:   He presented with dyspnea and activity intolerance + chest pain  Denied chest pain to me on initial visit the next day  Found on presentation to have bilateral pleural effusions, Rt > Lt  Also found to be in Afib reportedly with history of long standing A fib > 1 year (per note)  ; Trop normal  He was  requiring nasal cannula oxygen to maintain adequate oxygenation, which was new  Diuresis initiated but carefully considering CKD  Echo requested  Cards consulted     Also found on presentation to have Hgb 6.4 (baseline 9.7-10.2)   Despite baseline as above, he has been dropping his Hgb since May 2022 with average Hgb from May to present (7.8-8.2)  He denies overt blood loss  He has known CKD which is likely etiology of anemia  MCV (84) & MCHC (33.5) normal  Ferritin in July 253  He received one unit PRBC with subsequent Hgb 7.4  Should likely start colony stimulating factors per renal known to him OP     Additionally, he was also found to have potassium of 2.6  Electrolytes repleted       Renal and cards consult pending    11/13: Feels well. Denies chest pain or continued dyspnea. Has been weaned to RA. I eager to discharge but after seen by cardiology it has come to light that his A fib is not chronic. He is intolerant of thinners. Cards recommending DCCV in am. NPO after MN.     11/14: JOSE with DCCV canceled to allow for scheduled time off Plavix and ASA. Cardiology cleared him for discharge. He cannot take NOAC due to profound anemia in the setting of NOAC usage. His afib has CVR. He is stable and will remain on ASA and Plavix and follow up with cardiology known to him for scheduled DCCV.        Goals of Care Treatment Preferences:  Code Status: Full Code      Consults:   Consults (From admission, onward)        Status Ordering Provider     Inpatient consult to Anesthesiology  Once        Provider:  (Not yet assigned)    Acknowledged KWESI MURPHY     Inpatient consult to Nephrology-[facility] (Elmer Bernstein, Linnea)  Once        Provider:  Loly Payne MD    Completed STEPHEN STALLWORTH     Inpatient consult to Cardiology-[facility]  Once        Provider:  Dedrick Vines MD    Completed STEPHEN STALLWORTH          * Anemia due to stage 4 chronic kidney disease  - Hgb 6.4 from baseline 8-9; no  signs of bleeding  - Likely 2/2 CKD Stage 4. Has required transfusions in the past and sees hematology for IV iron  - Start 1U pRBC; consents signed  - Outpatient hematology to eval restarting IV iron and +/- EPO  - Repeat CBC in AM  - Monitor for signs of bleeding  - Will continue DAPT has benefits > risks at this point in time      Baseline and stable  Needs CSF or OP infusions  Defer to renal OP    Paroxysmal atrial fibrillation  Patient with Persistent (7 days or more) atrial fibrillation which is controlled currently with Beta Blocker. Patient is currently in atrial fibrillation.[id number] Score: 1. Anticoagulation not indicated due to intolerance.    Cards plans for DCCV in am  DCCV canceled   Plan for OP cards follow up for arrangement of DCCV/JOSE    COPD (chronic obstructive pulmonary disease)  Stable  Continue PRN BOSTON  OP PCP follow up          CKD (chronic kidney disease) stage 4, GFR 15-29 ml/min  - Baseline sCr 2.5-3; at baseline now  - pRBC transfusion as above  - Avoid further nephrotoxic agents  - IV diuretics as above  - Recommend outpatient nephrology    Will need OP nephrology  He tells me he doesn't have one anymore  Refer to OP renal      Hypokalemia  - Chronic issue despite being on MRA and having significant CKD  - Could be RTA?  - Will give 50 meq KCl x 1  - Repeat BMP in AM  - Telemetry for arrhythmia  - Will be careful with K replacement as risk factors for hyperkalemia    Refractory  Magnesium normal  Likely due to aggressive diuresis      Persistent and chronic  Continue home K dur but increase to TID  Close OP following  Refer to renal      Hyperlipidemia   Patient is chronically on statin.will continue for now. Monitor clinically. Last LDL was   Lab Results   Component Value Date    LDLCALC 41.6 (L) 09/02/2022        Continue home Crestor    HTN (hypertension)  Patient has a current diagnosis of HTN which is controlled.  Latest blood pressure and vitals reviewed-   Temp:  [96.3 °F (35.7  °C)-97.9 °F (36.6 °C)]   Pulse:  [69-95]   Resp:  [15-23]   BP: (116-146)/(58-92)   SpO2:  [98 %-100 %] .     Home meds for hypertension were reviewed and noted below.   Hypertension Medications             carvediloL (COREG) 25 MG tablet Take 1 tablet (25 mg total) by mouth 2 (two) times daily with meals.    eplerenone (INSPRA) 50 MG Tab Take 1 tablet (50 mg total) by mouth once daily.    furosemide (LASIX) 40 MG tablet Take 1 tablet (40 mg total) by mouth once daily.          Continue home meds        Final Active Diagnoses:    Diagnosis Date Noted POA    PRINCIPAL PROBLEM:  Anemia due to stage 4 chronic kidney disease [N18.4, D63.1] 12/15/2021 Yes    Paroxysmal atrial fibrillation [I48.0] 11/12/2022 Yes    CKD (chronic kidney disease) stage 4, GFR 15-29 ml/min [N18.4] 08/20/2021 Yes    COPD (chronic obstructive pulmonary disease) [J44.9] 08/20/2021 Yes     Chronic    Hypokalemia [E87.6] 10/04/2019 Yes    Coronary artery disease involving native coronary artery of native heart without angina pectoris [I25.10] 03/29/2019 Yes    Atherosclerosis of native artery of both lower extremities with intermittent claudication [I70.213] 03/02/2018 Yes    Hyperlipidemia [E78.5] 06/12/2015 Yes     Chronic    Claudication in peripheral vascular disease [I73.9] 06/13/2014 Yes    HTN (hypertension) [I10] 04/29/2013 Yes      Problems Resolved During this Admission:    Diagnosis Date Noted Date Resolved POA    Chest pain [R07.9] 11/12/2022 11/14/2022 Yes    Hypoxia [R09.02] 01/30/2020 11/13/2022 Yes       Discharged Condition: stable    Disposition: Home or Self Care    Follow Up:   Follow-up Information     Analy Encarnacion MD. Schedule an appointment as soon as possible for a visit in 1 day(s).    Specialty: Internal Medicine  Contact information:  [address] [facility] [address] [phone]             Keo Jenkins MD. Schedule an appointment as soon as possible for a visit in 1 day(s).    Specialties:  Interventional Cardiology, Cardiology  Contact information:  [address]  [place] [address]  [phone]             [facility] NEPHROLOGY. Schedule an appointment as soon as possible for a visit in 1 day(s).    Specialty: Nephrology  Contact information:  [address]  [place] [address]  [phone]                     Patient Instructions:      Ambulatory referral/consult to Nephrology   Standing Status: Future   Referral Priority: Routine Referral Type: Consultation   Referral Reason: Specialty Services Required   Requested Specialty: Nephrology   Number of Visits Requested: 1     Activity as tolerated       Significant Diagnostic Studies:   Recent Labs   Lab 11/12/22  0824 11/13/22 0232 11/14/22 0238   WBC 6.25 6.51 6.44   HGB 7.4* 7.6* 9.0*   HCT 22.1* 22.6* 26.0*   * 164 188   MONO  --  10.6  0.7 9.3  0.6     Recent Labs   Lab 11/12/22  0112 11/12/22  0824 11/13/22 0232 11/13/22  1453 11/14/22  0238 11/14/22  1336      < > 140 137 138  --    K 3.2*   < > 2.7* 3.3*  3.4* 3.0* 3.5      < > 104 101 102  --    CO2 13*   < > 19* 26 24  --    BUN 32*   < > 39* 38* 43*  --    CREATININE 2.5*   < > 2.6* 2.7* 2.8*  --    CALCIUM 8.5*   < > 9.3 9.8 9.1  --    PROT 6.2  --  6.5  --  6.6  --    BILITOT 0.5  --  0.6  --  0.6  --    ALKPHOS 58  --  64  --  65  --    ALT 15  --  13  --  14  --    AST 23  --  18  --  18  --     < > = values in this interval not displayed.     Microbiology Results (last 7 days)     Procedure Component Value Units Date/Time    Blood culture #2 **CANNOT BE ORDERED STAT** [[id number]] Collected: 11/12/22 0144    Order Status: Completed Specimen: Blood from Peripheral, Upper Arm, Left Updated: 11/14/22 1012     Blood Culture, Routine No Growth to date      No Growth to date      No Growth to date    Blood culture #1 **CANNOT BE ORDERED STAT** [[id number]] Collected: 11/12/22 0145    Order Status: Completed Specimen: Blood from Peripheral, Hand,  Right Updated: 11/14/22 1012     Blood Culture, Routine No Growth to date      No Growth to date      No Growth to date            Pending Diagnostic Studies:     Procedure Component Value Units Date/Time    Immunofixation, Urine  Random [[id number]] Collected: 11/13/22 0434    Order Status: Sent Lab Status: In process Updated: 11/13/22 1542    Specimen: Urine, Clean Catch          Medications:  Reconciled Home Medications:      Medication List      START taking these medications    furosemide 40 MG tablet  Commonly known as: LASIX  Take 1 tablet (40 mg total) by mouth once daily.        CHANGE how you take these medications    carvediloL 25 MG tablet  Commonly known as: COREG  Take 1 tablet (25 mg total) by mouth 2 (two) times daily with meals.  What changed: how much to take     potassium chloride SA 20 MEQ tablet  Commonly known as: K-DUR,KLOR-CON  Take 2 tablets (40 mEq total) by mouth 3 (three) times daily.  What changed: when to take this        CONTINUE taking these medications    albuterol 90 mcg/actuation inhaler  Commonly known as: VENTOLIN HFA  Inhale 2 puffs into the lungs every 6 (six) hours as needed for Wheezing. Rescue     ASPIR-81 ORAL  Take 81 mg by mouth once daily.     clopidogreL 75 mg tablet  Commonly known as: PLAVIX  Take 1 tablet (75 mg total) by mouth once daily.     coenzyme Q10 100 mg capsule  Take 100 mg by mouth once daily.     eplerenone 50 MG Tab  Commonly known as: INSPRA  Take 1 tablet (50 mg total) by mouth once daily.     rosuvastatin 40 MG Tab  Commonly known as: CRESTOR  Take 1 tablet (40 mg total) by mouth every evening.        STOP taking these medications    hydrALAZINE 100 MG tablet  Commonly known as: APRESOLINE     mupirocin 2 % ointment  Commonly known as: BACTROBAN            Indwelling Lines/Drains at time of discharge:   Lines/Drains/Airways     None                 Time spent on the discharge of patient: 60 minutes         Raissa Peres NP  Department of Hospital  [facility]

## 2022-11-14 NOTE — PLAN OF CARE
Problem: Adult Inpatient Plan of Care  Goal: Plan of Care Review  Outcome: Ongoing, Progressing     VIRTUAL NURSE:  Labs, notes, orders, and careplan reviewed.     none

## 2022-11-14 NOTE — PROGRESS NOTES
Nephrology Progress Note     Consult Requested By: Pawan Garcia, *  Reason for Consult: BRETT     SUBJECTIVE:        ?    Review of Systems   Constitutional:  Negative for chills and fever.   HENT:  Negative for congestion and sore throat.    Eyes:  Negative for blurred vision, double vision and photophobia.   Respiratory:  Negative for cough and shortness of breath.    Cardiovascular:  Negative for chest pain, palpitations and leg swelling.   Gastrointestinal:  Negative for abdominal pain, diarrhea, nausea and vomiting.   Genitourinary:  Negative for dysuria and urgency.   Musculoskeletal:  Negative for joint pain and myalgias.   Skin:  Negative for itching and rash.   Neurological:  Negative for dizziness, sensory change, weakness and headaches.   Endo/Heme/Allergies:  Negative for polydipsia. Does not bruise/bleed easily.   Psychiatric/Behavioral:  Negative for depression.      Past Medical History:   Diagnosis Date    Allergy     Anemia     Anticoagulant long-term use     Arthritis     CKD (chronic kidney disease) stage 4, GFR 15-29 ml/min     COPD (chronic obstructive pulmonary disease) 08/20/2021    Coronary artery disease     Heart attack 10/04/2019    Hematuria     Hemothorax     Hyperlipidemia     Hypertension     Hyperuricemia     Hypocalcemia     Hypokalemia     Hypophosphatemia     PAD (peripheral artery disease)     Proteinuria     Vitamin D deficiency      Past Surgical History:   Procedure Laterality Date    ABDOMINAL AORTOGRAPHY N/A 5/10/2019    Procedure: AORTOGRAM-ABDOMINAL;  Surgeon: Keo Jenkins MD;  Location: [facility] CATH LAB/EP;  Service: Cardiology;  Laterality: N/A;  RSFA intervention     AORTOGRAPHY WITH SERIALOGRAPHY N/A 12/3/2021    Procedure: AORTOGRAM, WITH SERIALOGRAPHY;  Surgeon: Keo Jenkins MD;  Location: [facility] CATH LAB/EP;  Service: Cardiology;  Laterality: N/A;    AORTOGRAPHY WITH SERIALOGRAPHY N/A 4/1/2022    Procedure: AORTOGRAM, WITH SERIALOGRAPHY;  Surgeon: Keo KYLE  MD Gregory;  Location: [facility] CATH LAB/EP;  Service: Cardiology;  Laterality: N/A;    BONE MARROW BIOPSY Right 10/12/2021    Procedure: BIOPSY-BONE MARROW;  Surgeon: Naseem Woods MD;  Location: [facility] OR;  Service: Oncology;  Laterality: Right;    CARDIAC CATHETERIZATION      CATARACT EXTRACTION      CATARACT EXTRACTION W/  INTRAOCULAR LENS IMPLANT Right 6/8/2020    Procedure: EXTRACTION, CATARACT, WITH IOL INSERTION;  Surgeon: Tin Light MD;  Location: [facility] OR;  Service: Ophthalmology;  Laterality: Right;    CATARACT EXTRACTION W/  INTRAOCULAR LENS IMPLANT Left 7/2/2020    Procedure: EXTRACTION, CATARACT, WITH IOL INSERTION;  Surgeon: Tin Light MD;  Location: [facility] OR;  Service: Ophthalmology;  Laterality: Left;    COLONOSCOPY N/A 1/6/2022    Procedure: COLONOSCOPY;  Surgeon: Kathe Penaloza MD;  Location: [facility] ENDO (2ND FLR);  Service: Endoscopy;  Laterality: N/A;  COVID test at [facility] on 1/3-GT  okay to hold Plavix for 5 days and aspirin per Dr. Becerra  2nd floor due toextensive cardiac history   instructions mailed and [facility] -     CORONARY ANGIOGRAPHY N/A 3/29/2019    Procedure: ANGIOGRAM, CORONARY ARTERY;  Surgeon: Keo Jenkins MD;  Location: [facility] CATH LAB/EP;  Service: Cardiology;  Laterality: N/A;    CORONARY ANGIOGRAPHY Left 10/11/2019    Procedure: ANGIOGRAM, CORONARY ARTERY;  Surgeon: Keo Jenkins MD;  Location: [facility] CATH LAB/EP;  Service: Cardiology;  Laterality: Left;    CORONARY ANGIOPLASTY WITH STENT PLACEMENT  03/29/2019    mid and distal RCA    ESOPHAGOGASTRODUODENOSCOPY N/A 1/6/2022    Procedure: EGD (ESOPHAGOGASTRODUODENOSCOPY);  Surgeon: Kathe Penaloza MD;  Location: [facility] ENDO (2ND FLR);  Service: Endoscopy;  Laterality: N/A;    EYE SURGERY      INTRAVASCULAR ULTRASOUND, NON-CORONARY  8/12/2022    Procedure: Intravascular Ultrasound, Non-Coronary;  Surgeon: Keo Jenkins MD;  Location: [facility] CATH LAB/EP;  Service: Cardiology;;    LEFT HEART CATHETERIZATION N/A  3/29/2019    Procedure: Left heart cath;  Surgeon: Keo Jenkins MD;  Location: [facility] CATH LAB/EP;  Service: Cardiology;  Laterality: N/A;    LEFT HEART CATHETERIZATION Left 10/8/2019    Procedure: Left heart cath;  Surgeon: Keo Jenkins MD;  Location: [facility] CATH LAB/EP;  Service: Cardiology;  Laterality: Left;    PERCUTANEOUS TRANSLUMINAL ANGIOPLASTY (PTA) OF PERIPHERAL VESSEL N/A 2019    Procedure: PTA, PERIPHERAL VESSEL;  Surgeon: Keo Jenkins MD;  Location: [facility] CATH LAB/EP;  Service: Cardiology;  Laterality: N/A;    PERCUTANEOUS TRANSLUMINAL ANGIOPLASTY (PTA) OF PERIPHERAL VESSEL Left 2022    Procedure: PTA, PERIPHERAL VESSEL;  Surgeon: Keo Jenkins MD;  Location: [facility] CATH LAB/EP;  Service: Cardiology;  Laterality: Left;    PERCUTANEOUS TRANSLUMINAL ANGIOPLASTY (PTA) OF PERIPHERAL VESSEL Right 2022    Procedure: PTA, PERIPHERAL VESSEL;  Surgeon: Keo Jenkins MD;  Location: [facility] CATH LAB/EP;  Service: Cardiology;  Laterality: Right;     Family History   Problem Relation Age of Onset    Aneurysm Mother     Hypertension Mother     Heart disease Mother     Cancer Father     Diabetes Sister     Hypertension Sister     No Known Problems Brother     No Known Problems Son      Social History     Tobacco Use    Smoking status: Former     Types: Cigarettes     Quit date: 1999     Years since quittin.5    Smokeless tobacco: Never   Substance Use Topics    Alcohol use: No     Alcohol/week: 0.0 standard drinks    Drug use: No       Review of patient's allergies indicates:   Allergen Reactions    Ace inhibitors Rash            OBJECTIVE:     Vital Signs (Most Recent)  Vitals:    22 1016 22 1021 22 1030 22 1050   BP: 127/87 127/87  126/71   BP Location: Left arm Left arm  Left arm   Patient Position: Sitting   Sitting   Pulse: 92 80 78 82   Resp: 16 (!) 23 15 (!) 21   Temp: 97.6 °F (36.4 °C)      TempSrc: Temporal      SpO2: 100% 99% 99% 99%   Weight:       Height:              Date 11/14/22 0700 - 11/15/22 0659   Shift 3489-1744 7372-6617 7241-9458 24 Hour Total   INTAKE   Shift Total(mL/kg)       OUTPUT   Urine(mL/kg/hr) 850   850   Shift Total(mL/kg) 850(11.4)   850(11.4)   Weight (kg) 74.4 74.4 74.4 74.4           Medications:   albuterol-ipratropium  3 mL Nebulization Q6H    aspirin  81 mg Oral Daily    atorvastatin  40 mg Oral Daily    carvediloL  25 mg Oral BID WM    clopidogreL  75 mg Oral Daily    furosemide (LASIX) injection  20 mg Intravenous Daily    magnesium oxide  400 mg Oral TID    potassium chloride  40 mEq Oral TID    spironolactone  50 mg Oral Daily    spironolactone  50 mg Oral Once           Physical Exam  Vitals and nursing note reviewed.   Constitutional:       General: He is not in acute distress.     Appearance: He is not diaphoretic.   HENT:      Head: Normocephalic and atraumatic.      Mouth/Throat:      Pharynx: No oropharyngeal exudate.   Eyes:      General: No scleral icterus.     Conjunctiva/sclera: Conjunctivae normal.      Pupils: Pupils are equal, round, and reactive to light.   Cardiovascular:      Rate and Rhythm: Normal rate and regular rhythm.      Heart sounds: Normal heart sounds. No murmur heard.  Pulmonary:      Effort: Pulmonary effort is normal. No respiratory distress.      Breath sounds: Normal breath sounds.   Abdominal:      General: Bowel sounds are normal. There is no distension.      Palpations: Abdomen is soft.      Tenderness: There is no abdominal tenderness.   Musculoskeletal:         General: Normal range of motion.      Cervical back: Normal range of motion and neck supple.   Skin:     General: Skin is warm and dry.      Findings: No erythema.   Neurological:      Mental Status: He is alert and oriented to person, place, and time.      Cranial Nerves: No cranial nerve deficit.   Psychiatric:         Mood and Affect: Affect normal.         Cognition and Memory: Memory normal.         Judgment: Judgment normal.        Laboratory:  Recent Labs   Lab 11/12/22 0824 11/13/22 0232 11/14/22 0238   WBC 6.25 6.51 6.44   HGB 7.4* 7.6* 9.0*   HCT 22.1* 22.6* 26.0*   * 164 188   MONO  --  10.6  0.7 9.3  0.6     Recent Labs   Lab 11/13/22 0232 11/13/22 1453 11/14/22 0238    137 138   K 2.7* 3.3*  3.4* 3.0*    101 102   CO2 19* 26 24   BUN 39* 38* 43*   CREATININE 2.6* 2.7* 2.8*   CALCIUM 9.3 9.8 9.1   PHOS 3.5  --   --        Diagnostic Results:  X-Ray: Reviewed  US: Reviewed  Echo: Reviewed  ASSESSMENT/PLAN:     CKD3 - Cr stable 2.8   Proteinuria - SPEP UPEP repeat  pending   -- mainly Alb   -- TOOTIE C3/C4 negative No Hep C or B   Check HIV.     Hypokalemia   -- Replace cut back lasix add Aldactone   2. HTN (I10) -  controlled   3. Anemia of chronic kidney disease    Recent Labs   Lab 11/12/22 0824 11/13/22 0232 11/14/22 0238   HGB 7.4* 7.6* 9.0*   HCT 22.1* 22.6* 26.0*   * 164 188       Iron   Lab Results   Component Value Date    IRON 63 09/14/2022    TIBC 349 09/14/2022    FERRITIN 253 07/25/2022       4. MBD (E88.9 M90.80) -  Recent Labs   Lab 11/13/22 0232 11/13/22 1453 11/14/22 0238   CALCIUM 9.3   < > 9.1   PHOS 3.5  --   --     < > = values in this interval not displayed.     Recent Labs   Lab 11/12/22 0824 11/13/22 0232 11/14/22 0238   MG 1.8 1.6 1.7       Lab Results   Component Value Date    PTH 60.6 08/11/2021    CALCIUM 9.1 11/14/2022    CAION 1.31 07/14/2020    PHOS 3.5 11/13/2022     Lab Results   Component Value Date    [id number] 42 08/11/2021       Lab Results   Component Value Date    CO2 24 11/14/2022       5. A-fib -  per cardiology   6. Nutrition/Hypoalbuminemia (E88.09) -    Recent Labs   Lab 11/13/22  0232 11/14/22  0238   ALBUMIN 3.0* 2.9*     Nepro with meals TID.       Thank you for the consult, will follow  With any question please call [phone]  Nena Arriola MD    [facility]  NIA Bernstein MD, ESPERANZA CHAU MD,   MOODY Yarbrough MD  V. V.  MD GALDINO Arriola, NP    [address]  [place], [address]  [phone]

## 2022-11-14 NOTE — PROGRESS NOTES
"[facility]           Pharmacy  Admission Medication Reconciliation     Based on information gathered for medication list, you may go to "Admission" then "Reconcile Home Medications" tabs to review and/or act upon those items.     The home medication list has been updated by the Pharmacy department.   Please read ALL comments highlighted in red.   Please address this information as you see fit.    Feel free to contact us if you have any questions or require assistance.    Home medication list has been compared to current inpatient medications. Please review the following discrepancies noted below:    Patient reports STILL TAKING the following medication(s) which was not ordered upon admit  Hydralazine 100mg twice daily  Patient reports taking a DIFFERENT dose,route, or frequency than what is ordered upon admit  Carvedilol 50mg twice daily  Feel free to contact us if you have any questions or require assistance.    Janeen Gtz, PharmD  [phone]    "

## 2022-11-14 NOTE — PLAN OF CARE
"[facility] - Telemetry  Discharge Final Note    Primary Care Provider: Analy Encarnacion MD    Expected Discharge Date: 11/14/2022    Pharmacist will go over home medications and reasons for medications. VN and bedside nurse to reiterate final discharge instructions.     Cleared from CM . Bedside Nurse and VN notified.      Final Discharge Note (most recent)       Final Note - 11/14/22 1420          Final Note    Assessment Type Final Discharge Note (P)      Anticipated Discharge Disposition Home or Self Care (P)      Hospital Resources/Appts/Education Provided Appointments scheduled and added to AVS (P)         Post-Acute Status    Post-Acute Authorization Other (P)      Other Status No Post-Acute Service Needs (P)      Discharge Delays None known at this time (P)                    Future Appointments   Date Time Provider Department Center   11/22/2022  7:15 AM LAB, [facility] [place]   2[facility]27[facility]20[facility]  [facility]   3/2/2023  2:40 PM Analy Encarnacion MD [facility]     /83 (BP Location: Right arm, Patient Position: Lying)   Pulse 77   Temp 96.3 °F (35.7 °C) (Axillary)   Resp 18   Ht 5' 10" (1.778 m)   Wt 74.4 kg (164 lb)   SpO2 100%   BMI 23.53 kg/m²       Contact Info       Analy Encarnacion MD   Specialty: Internal Medicine   Relationship: PCP - General    [address] [facility]  [place]   Phone: [phone]       Next Steps: Schedule an appointment as soon as possible for a visit in 1 day(s)    Keo Jenkins MD   Specialty: Interventional Cardiology, Cardiology    [address]  [place] [address]   Phone: [phone]       Next Steps: Schedule an appointment as soon as possible for a visit in 1 day(s)    [facility]   Specialty: Nephrology    [address] [place] [address]   Phone: [phone]       Next Steps: Schedule an appointment as soon as possible for a visit in 1 day(s)               Medication List        START " taking these medications      furosemide 40 MG tablet  Commonly known as: LASIX  Take 1 tablet (40 mg total) by mouth once daily.            CHANGE how you take these medications      carvediloL 25 MG tablet  Commonly known as: COREG  Take 1 tablet (25 mg total) by mouth 2 (two) times daily with meals.  What changed: how much to take     potassium chloride SA 20 MEQ tablet  Commonly known as: K-DUR,KLOR-CON  Take 2 tablets (40 mEq total) by mouth 3 (three) times daily.  What changed: when to take this            CONTINUE taking these medications      albuterol 90 mcg/actuation inhaler  Commonly known as: VENTOLIN HFA  Inhale 2 puffs into the lungs every 6 (six) hours as needed for Wheezing. Rescue     ASPIR-81 ORAL     clopidogreL 75 mg tablet  Commonly known as: PLAVIX  Take 1 tablet (75 mg total) by mouth once daily.     coenzyme Q10 100 mg capsule     eplerenone 50 MG Tab  Commonly known as: INSPRA  Take 1 tablet (50 mg total) by mouth once daily.     rosuvastatin 40 MG Tab  Commonly known as: CRESTOR  Take 1 tablet (40 mg total) by mouth every evening.            STOP taking these medications      hydrALAZINE 100 MG tablet  Commonly known as: APRESOLINE     mupirocin 2 % ointment  Commonly known as: BACTROBAN               Where to Get Your Medications        These medications were sent to [facility] [address], [place] [address]      Hours: Mon-Fri, 8a-5:30p Phone: [phone]   carvediloL 25 MG tablet  furosemide 40 MG tablet  potassium chloride SA 20 MEQ tablet

## 2022-11-14 NOTE — ASSESSMENT & PLAN NOTE
Patient is chronically on statin.will continue for now. Monitor clinically. Last LDL was   Lab Results   Component Value Date    LDLCALC 41.6 (L) 09/02/2022        Continue home Crestor

## 2022-11-14 NOTE — PROGRESS NOTES
VN cued into room to review discharge instructions.  Went over doctor specific instructions, new medications, verified bedside delivery, follow up appointments, when to call the doctor.  All questions answered.  Informed pt of survey.  Transport requested.       11/14/22 1746   Shift   Virtual Nurse - Rounding Complete   Virtual Nurse - Patient Verbalized Approval Of Camera Use;VN Rounding   Type of Frequent Check   Type Other (see comments)  (discharge)

## 2022-11-14 NOTE — ASSESSMENT & PLAN NOTE
Patient with Persistent (7 days or more) atrial fibrillation which is controlled currently with Beta Blocker. Patient is currently in atrial fibrillation.[id number] Score: 1. Anticoagulation not indicated due to intolerance.    Cards plans for DCCV in am  DCCV canceled   Plan for OP cards follow up for arrangement of DCCV/JOSE

## 2022-11-14 NOTE — PLAN OF CARE
[facility] - Telemetry  Initial Discharge Assessment       Primary Care Provider: Analy Encarnacion MD    Admission Diagnosis: Chest pain [R07.9]  Symptomatic anemia [D64.9]    Admission Date: 11/12/2022  Expected Discharge Date: 11/14/2022    Pt oriented. DCA done at bedside with patient. Demographics/Ins/NextofKin/PCP verified with patient/family and updated as needed. Denies any DME needs at present from pt/family. Patient/family plans to return home with family. Educated on bedside RX. Patient consents for [place] to discuss discharge plan with next of kin. Preferences appointment times and location obtained. Patient reports they will have transportation home upon discharge.        Discharge Barriers Identified: (P) None    Payor: MEDICAID / Plan: HEALTHY BLUE [place] / Product Type: Managed Medicaid /     Extended Emergency Contact Information  Primary Emergency Contact: Karmen Gross   [place]  Mobile Phone: [phone]  Relation: Spouse    Discharge Plan A: (P) Home, Home with family         [id number] - [place] - [address] AT [facility] [address]  [address]  [place] [address]  Phone: [phone] Fax: [phone]    [facility] [place] [place] - [address]  [address]  [facility] [address]  Phone: [phone] Fax: [phone]    [facility]  [address] [facility] [address]  [place] [address]  Phone: [phone] Fax: [phone]      Initial Assessment (most recent)       Adult Discharge Assessment - 11/14/22 1417          Discharge Assessment    Assessment Type Discharge Planning Assessment (P)      Confirmed/corrected address, phone number and insurance Yes (P)      Confirmed Demographics Correct on Facesheet (P)      Source of Information patient (P)      Communicated FLACO with patient/caregiver Yes (P)      Lives With spouse (P)      Do you expect to return to your current living situation? Yes (P)       Prior to hospitilization cognitive status: Alert/Oriented;No Deficits (P)      Current cognitive status: Alert/Oriented;No Deficits (P)      Walking or Climbing Stairs Difficulty none (P)      Dressing/Bathing Difficulty none (P)      Equipment Currently Used at Home none (P)      Readmission within 30 days? No (P)      Patient currently being followed by outpatient case management? No (P)      Do you currently have service(s) that help you manage your care at home? No (P)      Do you take prescription medications? Yes (P)      Do you have any problems affording any of your prescribed medications? No (P)      Is the patient taking medications as prescribed? yes (P)      How do you get to doctors appointments? car, drives self;family or friend will provide (P)      Are you on dialysis? No (P)      Do you take coumadin? No (P)      Discharge Plan A Home;Home with family (P)      DME Needed Upon Discharge  none (P)      Discharge Plan discussed with: Patient;Spouse/sig other (P)      Discharge Barriers Identified None (P)         Housing Stability    In the last 12 months, was there a time when you were not able to pay the mortgage or rent on time? No (P)      In the last 12 months, was there a time when you did not have a steady place to sleep or slept in a shelter (including now)? No (P)         Transportation Needs    In the past 12 months, has lack of transportation kept you from medical appointments or from getting medications? No (P)      In the past 12 months, has lack of transportation kept you from meetings, work, or from getting things needed for daily living? No (P)         Food Insecurity    Within the past 12 months, you worried that your food would run out before you got the money to buy more. Never true (P)      Within the past 12 months, the food you bought just didn't last and you didn't have money to get more. Never true (P)         Stress    Do you feel stress - tense, restless, nervous, or  "anxious, or unable to sleep at night because your mind is troubled all the time - these days? Not at all (P)         Social Connections    In a typical week, how many times do you talk on the phone with family, friends, or neighbors? Three times a week (P)      How often do you get together with friends or relatives? Never (P)      How often do you attend Buddhist or Religion services? Never (P)      Do you belong to any clubs or organizations such as Buddhist groups, unions, fraSwipp or athletic groups, or school groups? No (P)      How often do you attend meetings of the clubs or organizations you belong to? Never (P)      Are you , , , , never , or living with a partner?  (P)                    Future Appointments   Date Time Provider Department Center   11/22/2022  7:15 AM LAB, [facility] [place]   2/27/2023  8:20 AM LAB, [facility]   3/2/2023  2:40 PM Analy Encarnacion MD [facility]     /83 (BP Location: Right arm, Patient Position: Lying)   Pulse 77   Temp 96.3 °F (35.7 °C) (Axillary)   Resp 18   Ht 5' 10" (1.778 m)   Wt 74.4 kg (164 lb)   SpO2 100%   BMI 23.53 kg/m²     Scheduled Meds:   albuterol-ipratropium  3 mL Nebulization Q6H    aspirin  81 mg Oral Daily    atorvastatin  40 mg Oral Daily    carvediloL  25 mg Oral BID WM    clopidogreL  75 mg Oral Daily    furosemide (LASIX) injection  20 mg Intravenous Daily    magnesium oxide  400 mg Oral TID    potassium chloride  40 mEq Oral TID    spironolactone  50 mg Oral Daily    spironolactone  50 mg Oral Once     Continuous Infusions:  PRN Meds:.sodium chloride, sodium chloride 0.9%, acetaminophen, albuterol, dextrose 10%, dextrose 10%, glucagon (human recombinant), glucose, glucose, influenza, melatonin, naloxone, ondansetron, sodium chloride 0.9%               "

## 2022-11-14 NOTE — TELEPHONE ENCOUNTER
----- Message from Courtney Everett sent at 11/14/2022  4:52 PM CST -----  Regarding: HFU  Patient is discharging from [facility] and a HFU w/Dr Tobin was requested by [facility]. Please schedule a sooner appointment and message me back so DC Nurse can relay appointment information to patient prior to discharge.    DX: Chest Pain    ThanksNirali  Access Navigator/Discharge

## 2022-11-14 NOTE — NURSING
Dr Cedillo at bedside to speak to the patient; Patient is on blood thinners ----asa and plavix for the heart--discussed risk and options with the patient

## 2022-11-15 ENCOUNTER — PATIENT OUTREACH (OUTPATIENT)
Dept: ADMINISTRATIVE | Facility: CLINIC | Age: 62
End: 2022-11-15
Payer: MEDICAID

## 2022-11-15 LAB — PATHOLOGIST INTERPRETATION IFE: NORMAL

## 2022-11-16 LAB — INTERPRETATION UR IFE-IMP: NORMAL

## 2022-11-17 LAB
BACTERIA BLD CULT: NORMAL
BACTERIA BLD CULT: NORMAL
PATHOLOGIST INTERPRETATION UIFE: NORMAL

## 2022-11-17 NOTE — TELEPHONE ENCOUNTER
"Reached out ot and arranged hosp fu appt with Dr Jenkins on 11/28 at 1140.  Notified this time is an "overbook" and there may be a delay in time of actual visit  Verbalized understanding and voiced appreciation of call with appt.   "

## 2022-11-17 NOTE — PHYSICIAN QUERY
PT Name: Domingo Gross  MR #: [id number]     DOCUMENTATION CLARIFICATION     CDS: Elena Blanco RN      Contact information:[email or web address]   This form is a permanent document in the medical record.     Query Date: November 17, 2022    By submitting this query, we are merely seeking further clarification of documentation.  Please utilize your independent clinical judgment when addressing the question(s) below.    The Medical Record contains the following   Indicators Supporting Clinical Findings Location in Medical Record   x Heart Failure documented Hypoxia  - CXR with pulm edema and R pleural effusion  - 2/2 CKD/HFpEF   11/12 H&P   x BNP  11/12/22 01:12    (H)      Lab Values   x EF/Echo The left ventricle is normal in size with mild concentric hypertrophy and normal systolic function.  The estimated ejection fraction is 55%.  A diastolic pattern consistent with atrial fibrillation observed.  Mild mitral regurgitation.  Mild tricuspid regurgitation.  Mild pulmonic regurgitation.  Normal right ventricular size with normal right ventricular systolic function.  There is no pulmonary hypertension.  Small anterior pericardial effusion.   10/12 Transthoracic echo (TTE) complete   x Radiology findings Increased pleural effusion mainly on the right with worsening of mild interstitial prominence.  Correlate for developing edema    Improved alveolar and interstitial opacities status post diuresis   11/12 X-Ray Chest AP Portable    11/13 X-Ray Chest AP Portable     x Subjective/Objective Respiratory Conditions Dyspnea, SOB, hypoxia 11/12 H&P    Recent/Current MI      Heart Transplant, LVAD     x Edema, JVD Generalized weakness and noted worsening leg edema    Extremities: edema   11/12 H&P    11/12 Nephrology Consult Note      Ascites     x Diuretics/Meds Current Outpatient Medications on File Prior to Encounter   Medication Sig    ASPIRIN (ASPIR-81 ORAL) Take 81 mg by mouth once daily.    carvediloL  (COREG) 25 MG tablet Take 2 tablets (50 mg total) by mouth 2 (two) times daily with meals.    clopidogreL (PLAVIX) 75 mg tablet Take 1 tablet (75 mg total) by mouth once daily.    coenzyme Q10 100 mg capsule Take 100 mg by mouth once daily.    doxazosin (CARDURA) 4 MG tablet TAKE 1 TABLET(4 MG) BY MOUTH EVERY EVENING (Patient taking differently: Take 4 mg by mouth every evening.)    eplerenone (INSPRA) 50 MG Tab Take 1 tablet (50 mg total) by mouth once daily.    hydrALAZINE (APRESOLINE) 100 MG tablet Take 1 tablet (100 mg total) by mouth 2 (two) times a day.    NIFEdipine (PROCARDIA-XL) 60 MG (OSM) 24 hr tablet Take 1 tablet (60 mg total) by mouth once daily.    potassium chloride SA (K-DUR,KLOR-CON) 20 MEQ tablet Take 2 tablets (40 mEq total) by mouth 2 (two) times daily.    rosuvastatin (CRESTOR) 40 MG Tab Take 1 tablet (40 mg total) by mouth every          Furosemide 80mg IV x 1  (11/12)  Furosemide 40mg IV x 2 doses  (11/12)  Spironolactone 50mg PO daily  (11/12-11/14)  Furosemide 40mg IV x 1  (11/13)  Furosemide 20mg IV x 1  (11/14)      Reconciled Home Medications:  START taking these medications   furosemide 40 MG tablet  Take 1 tablet (40 mg total) by mouth once daily.   11/12 H&P                                                              MAR              11/14 DC Summary   x Other Treatment O2 at 1-3L via NC   11/12 VS Flowsheet    Other       Heart failure is a clinical diagnosis which includes symptomatic fluid retention, elevated intracardiac pressures, and/or the inability of the heart to deliver adequate blood flow.    Heart Failure with reduced Ejection Fraction (HFrEF) or Systolic Heart Failure (loses ability to contract normally, EF is <40%)    Heart Failure with preserved Ejection Fraction (HFpEF) or Diastolic Heart Failure (stiff ventricles, does not relax properly, EF is >50%)     Heart Failure with Combined Systolic and Diastolic Failure (stiff ventricles, does not relax properly and EF is  <50%)    Mid-range or mildly reduced ejection fraction (HFmrEF) is classified as systolic heart failure.  Congestive heart failure with a recovered EF is classified as Diastolic Heart Failure.  Common clues to acute exacerbation:  Rapidly progressive symptoms (w/in 2 weeks of presentation), using IV diuretics, using supplemental O2, pulmonary edema on Xray, new or worsening pleural effusion, +JVD or other signs of volume overload, MI w/in 4 weeks, and/or BNP >500  The clinical guidelines noted are only system guidelines, and do not replace the providers clinical judgment.    Provider, please specify the Acuity of HFpEF associated with the above clinical findings.    [ x  ]  Acute on Chronic Diastolic Heart Failure (HFpEF) - worsening of CHF signs/symptoms in preexisting CHF   [   ]  Acute Diastolic Heart Failure (HFpEF) - new diagnosis   [   ]  Chronic Diastolic Heart Failure (HFpEF) - preexisting and stable   [   ]  Other (please specify): ___________________________________   [  ]  Clinically Undetermined     Please document in your progress notes daily for the duration of treatment until resolved and include in your discharge summary.    References:  American Heart Association editorial staff. (2017, May). Ejection Fraction Heart Failure Measurement. American Heart Association. https://www.heart.org/en/health-topics/heart-failure/diagnosing-heart-failure/ejection-fraction-heart-failure-measurement#:~:text=Ejection%20fraction%20(EF)%20is%20a,pushed%20out%20with%20each%20heartbeat  JOSUÉ Cifuentes (2020, December 15). Heart failure with preserved ejection fraction: Clinical manifestations and diagnosis. [employer]. https://www.[employer].com/contents/heart-failure-with-preserved-ejection-fraction-clinical-manifestations-and-diagnosis.  ICD-10-CM/PCS Coding Clinic Third Quarter ICD-10, Effective with discharges: September 8, 2020 [facility] § Heart failure with mid-range or mildly reduced ejection  fraction (2020).  ICD-10-CM/PCS Coding Clinic Third Quarter ICD-10, Effective with discharges: September 8, 2020 [facility] § Heart failure with recovered ejection fraction (2020).  Form No. 27620

## 2022-11-21 ENCOUNTER — PATIENT MESSAGE (OUTPATIENT)
Dept: INTERNAL MEDICINE | Facility: CLINIC | Age: 62
End: 2022-11-21
Payer: MEDICAID

## 2022-11-21 DIAGNOSIS — E87.6 HYPOKALEMIA: ICD-10-CM

## 2022-11-21 RX ORDER — POTASSIUM CHLORIDE 20 MEQ/1
40 TABLET, EXTENDED RELEASE ORAL 3 TIMES DAILY
Qty: 180 TABLET | Refills: 0 | Status: SHIPPED | OUTPATIENT
Start: 2022-11-21 | End: 2022-11-21 | Stop reason: SDUPTHER

## 2022-11-21 NOTE — TELEPHONE ENCOUNTER
No new care gaps identified.  [facility] Embedded Care Gaps. Reference number: [id number]. 11/21/2022   9:06:44 AM CST

## 2022-11-22 ENCOUNTER — LAB VISIT (OUTPATIENT)
Dept: LAB | Facility: HOSPITAL | Age: 62
End: 2022-11-22
Attending: INTERNAL MEDICINE
Payer: MEDICAID

## 2022-11-22 DIAGNOSIS — E87.6 HYPOKALEMIA: ICD-10-CM

## 2022-11-22 LAB
ANION GAP SERPL CALC-SCNC: 10 MMOL/L (ref 8–16)
BUN SERPL-MCNC: 27 MG/DL (ref 8–23)
CALCIUM SERPL-MCNC: 9.7 MG/DL (ref 8.7–10.5)
CHLORIDE SERPL-SCNC: 106 MMOL/L (ref 95–110)
CO2 SERPL-SCNC: 21 MMOL/L (ref 23–29)
CREAT SERPL-MCNC: 2.7 MG/DL (ref 0.5–1.4)
EST. GFR  (NO RACE VARIABLE): 26 ML/MIN/1.73 M^2
GLUCOSE SERPL-MCNC: 116 MG/DL (ref 70–110)
POTASSIUM SERPL-SCNC: 5 MMOL/L (ref 3.5–5.1)
SODIUM SERPL-SCNC: 137 MMOL/L (ref 136–145)

## 2022-11-22 PROCEDURE — 36415 COLL VENOUS BLD VENIPUNCTURE: CPT | Mod: PO | Performed by: INTERNAL MEDICINE

## 2022-11-22 PROCEDURE — 80048 BASIC METABOLIC PNL TOTAL CA: CPT | Performed by: INTERNAL MEDICINE

## 2022-11-23 DIAGNOSIS — E87.6 HYPOKALEMIA: Primary | ICD-10-CM

## 2022-11-28 ENCOUNTER — OFFICE VISIT (OUTPATIENT)
Dept: CARDIOLOGY | Facility: CLINIC | Age: 62
End: 2022-11-28
Payer: MEDICAID

## 2022-11-28 VITALS
HEART RATE: 99 BPM | OXYGEN SATURATION: 98 % | WEIGHT: 169.75 LBS | SYSTOLIC BLOOD PRESSURE: 114 MMHG | DIASTOLIC BLOOD PRESSURE: 67 MMHG | BODY MASS INDEX: 24.36 KG/M2

## 2022-11-28 DIAGNOSIS — I70.213 ATHEROSCLEROSIS OF NATIVE ARTERY OF BOTH LOWER EXTREMITIES WITH INTERMITTENT CLAUDICATION: ICD-10-CM

## 2022-11-28 DIAGNOSIS — I48.0 PAROXYSMAL ATRIAL FIBRILLATION: ICD-10-CM

## 2022-11-28 DIAGNOSIS — E78.2 MIXED HYPERLIPIDEMIA: Chronic | ICD-10-CM

## 2022-11-28 DIAGNOSIS — N18.4 ANEMIA DUE TO STAGE 4 CHRONIC KIDNEY DISEASE: ICD-10-CM

## 2022-11-28 DIAGNOSIS — D63.1 ANEMIA DUE TO STAGE 4 CHRONIC KIDNEY DISEASE: ICD-10-CM

## 2022-11-28 DIAGNOSIS — N18.4 CKD (CHRONIC KIDNEY DISEASE) STAGE 4, GFR 15-29 ML/MIN: ICD-10-CM

## 2022-11-28 DIAGNOSIS — I87.2 VENOUS INSUFFICIENCY OF BOTH LOWER EXTREMITIES: ICD-10-CM

## 2022-11-28 DIAGNOSIS — I25.10 CORONARY ARTERY DISEASE INVOLVING NATIVE CORONARY ARTERY OF NATIVE HEART WITHOUT ANGINA PECTORIS: Primary | ICD-10-CM

## 2022-11-28 PROCEDURE — 93010 ELECTROCARDIOGRAM REPORT: CPT | Mod: S$PBB,,, | Performed by: INTERNAL MEDICINE

## 2022-11-28 PROCEDURE — 3062F PR POS MACROALBUMINURIA RESULT DOCUMENTED/REVIEW: ICD-10-PCS | Mod: CPTII,,, | Performed by: INTERNAL MEDICINE

## 2022-11-28 PROCEDURE — 1111F PR DISCHARGE MEDS RECONCILED W/ CURRENT OUTPATIENT MED LIST: ICD-10-PCS | Mod: CPTII,,, | Performed by: INTERNAL MEDICINE

## 2022-11-28 PROCEDURE — 3062F POS MACROALBUMINURIA REV: CPT | Mod: CPTII,,, | Performed by: INTERNAL MEDICINE

## 2022-11-28 PROCEDURE — 3008F PR BODY MASS INDEX (BMI) DOCUMENTED: ICD-10-PCS | Mod: CPTII,,, | Performed by: INTERNAL MEDICINE

## 2022-11-28 PROCEDURE — 93005 ELECTROCARDIOGRAM TRACING: CPT | Mod: PBBFAC,PO | Performed by: INTERNAL MEDICINE

## 2022-11-28 PROCEDURE — 3074F PR MOST RECENT SYSTOLIC BLOOD PRESSURE < 130 MM HG: ICD-10-PCS | Mod: CPTII,,, | Performed by: INTERNAL MEDICINE

## 2022-11-28 PROCEDURE — 99999 PR PBB SHADOW E&M-EST. PATIENT-LVL III: ICD-10-PCS | Mod: PBBFAC,,, | Performed by: INTERNAL MEDICINE

## 2022-11-28 PROCEDURE — 3066F NEPHROPATHY DOC TX: CPT | Mod: CPTII,,, | Performed by: INTERNAL MEDICINE

## 2022-11-28 PROCEDURE — 99999 PR PBB SHADOW E&M-EST. PATIENT-LVL III: CPT | Mod: PBBFAC,,, | Performed by: INTERNAL MEDICINE

## 2022-11-28 PROCEDURE — 3078F DIAST BP <80 MM HG: CPT | Mod: CPTII,,, | Performed by: INTERNAL MEDICINE

## 2022-11-28 PROCEDURE — 3078F PR MOST RECENT DIASTOLIC BLOOD PRESSURE < 80 MM HG: ICD-10-PCS | Mod: CPTII,,, | Performed by: INTERNAL MEDICINE

## 2022-11-28 PROCEDURE — 99215 OFFICE O/P EST HI 40 MIN: CPT | Mod: S$PBB,,, | Performed by: INTERNAL MEDICINE

## 2022-11-28 PROCEDURE — 99213 OFFICE O/P EST LOW 20 MIN: CPT | Mod: PBBFAC,PO | Performed by: INTERNAL MEDICINE

## 2022-11-28 PROCEDURE — 3066F PR DOCUMENTATION OF TREATMENT FOR NEPHROPATHY: ICD-10-PCS | Mod: CPTII,,, | Performed by: INTERNAL MEDICINE

## 2022-11-28 PROCEDURE — 1111F DSCHRG MED/CURRENT MED MERGE: CPT | Mod: CPTII,,, | Performed by: INTERNAL MEDICINE

## 2022-11-28 PROCEDURE — 93010 EKG 12-LEAD: ICD-10-PCS | Mod: S$PBB,,, | Performed by: INTERNAL MEDICINE

## 2022-11-28 PROCEDURE — 99215 PR OFFICE/OUTPT VISIT, EST, LEVL V, 40-54 MIN: ICD-10-PCS | Mod: S$PBB,,, | Performed by: INTERNAL MEDICINE

## 2022-11-28 PROCEDURE — 3008F BODY MASS INDEX DOCD: CPT | Mod: CPTII,,, | Performed by: INTERNAL MEDICINE

## 2022-11-28 PROCEDURE — 3074F SYST BP LT 130 MM HG: CPT | Mod: CPTII,,, | Performed by: INTERNAL MEDICINE

## 2022-11-28 NOTE — PROGRESS NOTES
Subjective:   Patient ID:  Domingo Gross is a 62 y.o. male who presents for follow up of Atrial Fibrillation, Coronary Artery Disease, Peripheral Arterial Disease, Hyperlipidemia, and Hypertension      HPI:              Domingo Gross 62 y.o. male is here follow up feeling well.         Admission 11/2022      Anemia with H/H 6/19  Hypokalemia 2.6  Afib now in NSR (11/28/2022)  He was not cardioverted because of questions whether he could tolerate OAC in the setting of anemia.   S/p 1 u pRBC                He has a history CAD, HTN, HLP, PAF in NSR, edema, CKD, anemia related to CKD, and PAD with winsome IIb claudication. He is s/p bilateral SFA, GRUPO, EIA, and R CFA revascularization. His peripheral vascular intervention in 12/2021 was cancelled due to severe anemia with a H/H that required a transfusion with subsequent ongoing intervention by heme/onc. He has a GI work up for anemia was normal. His H/H has improved with heme/onc care.        He was admitted 10/4/2019 for cardiac arrest. He was fully rescued. Initial rhythm was afib with rvr. He was not taken immediately to the cath lab because of ARF + hypokalemia. He had a diagnostic angiogram which revealed patent LM, LAD, RCA stent, and new ostial LCX lesion 99% with R to L collaterals. He had PCI with REZA after his renal function returned to baseline.         8/19/2021 with CP HTN urgency with . Added nifedipine and BP has been stable. No CP            ELISABETH with stress 5/2017:   R 1.01 to 0.44   L 0.92 to 0.45    After 6 minutes ambulation      CTA 5/2017:       Patent distal aorta and bilateral GRUPO/EIA stents   L EIA with de shawna 90% stenosis   L SFA 80% with 3 vessel run off     R EIA/CFA with moderate disease   R SFA 80% stenosis with 3 vessel run off        Peripheral angiogram with intervention 6/2017         Patent bilateral GRUPO/EIA stents.    De shawna 80% left EIA stenosis treated with 9.0 x 80 mm Lifestar  stent post dilated with 8.0 mm  balloon.    Bilateral 70-80% SFA stenosis with 3 vessel run off.        3/2018      L SFA intervention for claudication   75% L SFA with 3 vessel run off   7 mmHg resting and 33 mmHg hyperemic mean gradient         L CFA antegrade access   PTA with 6.0 x 150 mm   PTA with 6.0 x 150 mm Lutonix   3 vessel run off           4/13/2018       S/p R SFA PTA for winsome IIb claudication   R CFA antegrade access         R CFA with 50% stenosis-heavily calcified lesion               Baseline /90         R SFA with 70% stenosis with a significant resting and hyperemic mean gradient     3 vessel run off             PTA of R SFA with 6.0 x 150 + 6.0 x 60 mm Lutonix DCB        5/2/2018   Patent arteries post revascularization        2/2019     R 0.84 to 0.27   L 0.90 to 0.66   After ambulation   Severe R calf claudication          Nuclear stress test 2/2019     + ECG with 2 mm ST depression   No wall motion abnormalities   Test stopped at 6 minutes, 7 METs, 86% predicted heart rate   Stopped because of R leg claudication + ECG changes          S/p PCI RCA and LAD with REZA 3/2019       RCA 3.5 x 22 mm Resolute REZA   LAD 2.5 x 30 and 2.5 x 25 mm Resolute REZA      Peripheral aortogram 5/10/2019     R CFA 95% stenosis   3 vessel run off        Seen by Dr. Giron for R CFA endarterectomy but preferable should be off plavix for at least 5 days. He has a risk for stent thrombosis with premature interruption of DAPT. He later had R CFA atherectomy + PTA with DCB.           10/11/2019 for cardiac arrest        S/p staged LCX PCI                    L CFA access              IVUS guided PCI              3.0 x 15 mm Resolute REZA post dilated with 3.5 NC balloon         PET stress 2/2020     No ischemia      Echo 2/2021      EF 55%   Normal diastolic fn   Normal RV fn   Mild MR/TR/OR         Arterial US 7/2021     Bilateral SFA disease > 70      Venous US 7/2021     No DVT   No superficial reflux   R POP vein reflux > 500  msec    Echo 8/2021      Normal EF   Normal RV fn   Mild-mod MR   Mod DC   PASP 26 mmHg        ELISABETH 11/2021      Resting ELISABETH right 0.84 and left 0.83   Exercise ELISABETH right 0.39 and left 0.32 after 3.5 minutes ambulation   TBI right 0.58 and left 0.48          US 11/2021      Bilateral mid SFA high grade disease      S/p PTA of L CFA + SFA with atherectomy + DCB (5/2022)       S/p PTA of R CFA with atherectomy + DCB (8/2022)      Echo 11/2022     Nl EF   Normal LA   Mild MR/TR/DC         Patient Active Problem List    Diagnosis Date Noted    Cardiac arrest 10/04/2019     Priority: High    Atherosclerosis of native artery of both lower extremities with intermittent claudication 03/02/2018     Priority: High    Paroxysmal atrial fibrillation 11/12/2022    Anemia due to stage 4 chronic kidney disease 12/15/2021    CKD (chronic kidney disease) stage 4, GFR 15-29 ml/min 08/20/2021    COPD (chronic obstructive pulmonary disease) 08/20/2021    Nuclear sclerosis, bilateral 06/08/2020    Nephrotic range proteinuria 04/06/2020    Hypokalemia 10/04/2019    Bilateral carotid artery stenosis     Coronary artery disease involving native coronary artery of native heart without angina pectoris 03/29/2019         3/29/2019    S/p LHC via R radial           LM, LAD, and LCX are patent with luminal irregularities  RCA mid 95% calcified lesion  Normal EF with LVEDP 8 mmHg           PCI of mid LAD with 2.5 x 30 and 2.5 x 15 mm Resolute REZA  PCI of proximal RCA to treat guide dissection with 3.5 x 22 Resolute REZA        10/11/2019 for cardiac arrest        S/p staged LCX PCI                    L CFA access              IVUS guided PCI              3.0 x 15 mm Resolute REZA post dilated with 3.5 NC balloon           Venous insufficiency of both lower extremities 06/04/2018    Hyperlipidemia 06/12/2015    DJD (degenerative joint disease), lumbar 05/27/2015    DDD (degenerative disc disease) 05/27/2015    Claudication in peripheral vascular  disease 06/13/2014    HTN (hypertension) 04/29/2013                      LAST HbA1c  Lab Results   Component Value Date    HGBA1C 5.7 (H) 10/28/2019       Lipid panel  Lab Results   Component Value Date    CHOL 116 (L) 09/02/2022    CHOL 105 (L) 08/20/2021    CHOL 162 03/15/2021     Lab Results   Component Value Date    HDL 50 09/02/2022    HDL 44 08/20/2021    HDL 43 03/15/2021     Lab Results   Component Value Date    LDLCALC 41.6 (L) 09/02/2022    LDLCALC 51.0 (L) 08/20/2021    LDLCALC 98.0 03/15/2021     Lab Results   Component Value Date    TRIG 122 09/02/2022    TRIG 50 08/20/2021    TRIG 105 03/15/2021     Lab Results   Component Value Date    CHOLHDL 43.1 09/02/2022    CHOLHDL 41.9 08/20/2021    CHOLHDL 26.5 03/15/2021            Review of Systems   Constitutional: Negative for diaphoresis, night sweats, weight gain and weight loss.   HENT:  Negative for congestion.    Eyes:  Negative for blurred vision, discharge and double vision.   Cardiovascular:  Negative for chest pain, claudication, cyanosis, dyspnea on exertion, irregular heartbeat, leg swelling, near-syncope, orthopnea, palpitations, paroxysmal nocturnal dyspnea and syncope.   Respiratory:  Negative for cough, shortness of breath and wheezing.    Endocrine: Negative for cold intolerance, heat intolerance and polyphagia.   Hematologic/Lymphatic: Negative for adenopathy and bleeding problem. Does not bruise/bleed easily.   Skin:  Negative for dry skin and nail changes.   Musculoskeletal:  Negative for arthritis, back pain, falls, joint pain, myalgias and neck pain.   Gastrointestinal:  Negative for bloating, abdominal pain, change in bowel habit and constipation.   Genitourinary:  Negative for bladder incontinence, dysuria, flank pain, genital sores and missed menses.   Neurological:  Negative for aphonia, brief paralysis, difficulty with concentration, dizziness and weakness.   Psychiatric/Behavioral:  Negative for altered mental status and memory  loss. The patient does not have insomnia.    Allergic/Immunologic: Negative for environmental allergies.     Objective:   Physical Exam  Constitutional:       Appearance: He is well-developed.      Interventions: He is not intubated.  HENT:      Head: Normocephalic and atraumatic.      Right Ear: External ear normal.      Left Ear: External ear normal.   Eyes:      General: No scleral icterus.        Right eye: No discharge.         Left eye: No discharge.      Conjunctiva/sclera: Conjunctivae normal.      Pupils: Pupils are equal, round, and reactive to light.   Neck:      Thyroid: No thyromegaly.      Vascular: Normal carotid pulses. No carotid bruit, hepatojugular reflux or JVD.      Trachea: No tracheal deviation.   Cardiovascular:      Rate and Rhythm: Normal rate and regular rhythm. No extrasystoles are present.     Chest Wall: PMI is not displaced.      Pulses:           Carotid pulses are 2+ on the right side and 2+ on the left side.       Radial pulses are 2+ on the right side and 2+ on the left side.        Femoral pulses are 2+ on the right side and 2+ on the left side.       Popliteal pulses are 2+ on the right side and 2+ on the left side.        Dorsalis pedis pulses are 1+ on the right side and 2+ on the left side.        Posterior tibial pulses are 1+ on the right side and 2+ on the left side.      Heart sounds: S1 normal and S2 normal. Heart sounds not distant. No midsystolic click. No murmur heard.    No friction rub. No gallop. No S3 sounds.      Comments:       Biphasic R DP and weak but biphasic R PT doppler signals       Biphasic L DP and PT doppler signals          Pulmonary:      Effort: Pulmonary effort is normal. No tachypnea, bradypnea, accessory muscle usage or respiratory distress. He is not intubated.      Breath sounds: Normal breath sounds. No stridor. No decreased breath sounds, wheezing or rales.   Chest:      Chest wall: No tenderness.   Abdominal:      General: There is no  distension or abdominal bruit.      Palpations: There is no mass or pulsatile mass.      Tenderness: There is no abdominal tenderness. There is no guarding or rebound.   Musculoskeletal:         General: No tenderness. Normal range of motion.      Cervical back: Normal range of motion and neck supple.   Lymphadenopathy:      Cervical: No cervical adenopathy.   Skin:     General: Skin is warm.      Coloration: Skin is not pale.      Findings: No erythema or rash.   Neurological:      Mental Status: He is alert and oriented to person, place, and time.      Cranial Nerves: No cranial nerve deficit.      Coordination: Coordination normal.      Deep Tendon Reflexes: Reflexes are normal and symmetric.   Psychiatric:         Behavior: Behavior normal.         Thought Content: Thought content normal.         Judgment: Judgment normal.         Assessment:     1. Coronary artery disease involving native coronary artery of native heart without angina pectoris    2. Venous insufficiency of both lower extremities    3. Paroxysmal atrial fibrillation    4. Atherosclerosis of native artery of both lower extremities with intermittent claudication    5. Mixed hyperlipidemia    6. CKD (chronic kidney disease) stage 4, GFR 15-29 ml/min    7. Anemia due to stage 4 chronic kidney disease        Plan:       Referral to discuss MIRIAN or chronic anti-coagulation in the setting PAF. He can also be evaluated for afib ablation.           Medical therapy for CAD  DAPT with aspirin + plavix  Intense statin therapy  Arb  Pletal       Target LDL < 70  Low salt diet  Weight loss        Continue with current medical plan and lifestyle changes.  Return sooner for concerns or questions. If symptoms persist go to the ED  I have reviewed all pertinent data on this patient           Follow up as scheduled. Return sooner for concerns or questions  He expressed verbal understanding and agreed with the plan        Greater than 50% of the visit of 45 minutes  was spent counseling, educating, and coordinating the care of the patient.      -In today's visit, at least 4 established conditions that pose a risk to life or bodily function have been addressed and the conditions are severe.    -In today's visit, monitoring for drug toxicity was accomplished.        Follow up as scheduled. Return sooner for concerns or questions      I have reviewed the patient's medical history in detail and updated the computerized patient record.    Orders Placed This Encounter   Procedures    COMPRESSION STOCKINGS     Knee high     Order Specific Question:   Pressure amount:     Answer:   20-30 mmHg    Ambulatory referral/consult to Electrophysiology     Standing Status:   Future     Standing Expiration Date:   1/10/2024     Referral Priority:   Routine     Referral Type:   Consultation     Referral Reason:   Specialty Services Required     Referred to Provider:   Dmitriy Chavarria MD     Requested Specialty:   Electrophysiology     Number of Visits Requested:   1    EKG 12-lead       Follow up as scheduled. Return sooner for concerns or questions            Patient's Medications   New Prescriptions    No medications on file   Previous Medications    ALBUTEROL (VENTOLIN HFA) 90 MCG/ACTUATION INHALER    Inhale 2 puffs into the lungs every 6 (six) hours as needed for Wheezing. Rescue    ASPIRIN (ASPIR-81 ORAL)    Take 81 mg by mouth once daily.    CARVEDILOL (COREG) 25 MG TABLET    Take 1 tablet (25 mg total) by mouth 2 (two) times daily with meals.    CLOPIDOGREL (PLAVIX) 75 MG TABLET    Take 1 tablet (75 mg total) by mouth once daily.    COENZYME Q10 100 MG CAPSULE    Take 100 mg by mouth once daily.    EPLERENONE (INSPRA) 50 MG TAB    Take 1 tablet (50 mg total) by mouth once daily.    FUROSEMIDE (LASIX) 40 MG TABLET    Take 1 tablet (40 mg total) by mouth once daily.    ROSUVASTATIN (CRESTOR) 40 MG TAB    Take 1 tablet (40 mg total) by mouth every evening.   Modified Medications    No  medications on file   Discontinued Medications    POTASSIUM CHLORIDE SA (K-DUR,KLOR-CON) 20 MEQ TABLET    Take 2 tablets (40 mEq total) by mouth 3 (three) times daily.

## 2022-11-28 NOTE — LETTER
November 28, 2022      [facility]  [address], [address]  [place] [address]  Phone: [phone]       Patient: Domingo Gross   YOB: 1960  Date of Visit: 11/28/2022    To Whom It May Concern:    Martha Gross  was at [facility] on 11/28/2022. The patient may return to work/school on 12/5/2022 without restrictions. If you have any questions or concerns, or if I can be of further assistance, please do not hesitate to contact me.    Sincerely,    Keo Jenkins MD

## 2022-11-29 ENCOUNTER — OFFICE VISIT (OUTPATIENT)
Dept: PRIMARY CARE CLINIC | Facility: CLINIC | Age: 62
End: 2022-11-29
Payer: MEDICAID

## 2022-11-29 ENCOUNTER — LAB VISIT (OUTPATIENT)
Dept: LAB | Facility: HOSPITAL | Age: 62
End: 2022-11-29
Attending: INTERNAL MEDICINE
Payer: MEDICAID

## 2022-11-29 VITALS
BODY MASS INDEX: 24.39 KG/M2 | TEMPERATURE: 98 F | DIASTOLIC BLOOD PRESSURE: 65 MMHG | OXYGEN SATURATION: 98 % | SYSTOLIC BLOOD PRESSURE: 118 MMHG | HEART RATE: 72 BPM | WEIGHT: 170 LBS

## 2022-11-29 DIAGNOSIS — D63.1 ANEMIA DUE TO STAGE 4 CHRONIC KIDNEY DISEASE: ICD-10-CM

## 2022-11-29 DIAGNOSIS — N18.4 ANEMIA DUE TO STAGE 4 CHRONIC KIDNEY DISEASE: ICD-10-CM

## 2022-11-29 DIAGNOSIS — E87.6 HYPOKALEMIA: ICD-10-CM

## 2022-11-29 DIAGNOSIS — N18.4 CKD (CHRONIC KIDNEY DISEASE) STAGE 4, GFR 15-29 ML/MIN: ICD-10-CM

## 2022-11-29 DIAGNOSIS — I48.0 PAROXYSMAL ATRIAL FIBRILLATION: Primary | ICD-10-CM

## 2022-11-29 LAB
ANION GAP SERPL CALC-SCNC: 13 MMOL/L (ref 8–16)
BUN SERPL-MCNC: 31 MG/DL (ref 8–23)
CALCIUM SERPL-MCNC: 9.4 MG/DL (ref 8.7–10.5)
CHLORIDE SERPL-SCNC: 104 MMOL/L (ref 95–110)
CO2 SERPL-SCNC: 24 MMOL/L (ref 23–29)
CREAT SERPL-MCNC: 2.8 MG/DL (ref 0.5–1.4)
EST. GFR  (NO RACE VARIABLE): 25 ML/MIN/1.73 M^2
GLUCOSE SERPL-MCNC: 89 MG/DL (ref 70–110)
POTASSIUM SERPL-SCNC: 3.6 MMOL/L (ref 3.5–5.1)
SODIUM SERPL-SCNC: 141 MMOL/L (ref 136–145)

## 2022-11-29 PROCEDURE — 3066F NEPHROPATHY DOC TX: CPT | Mod: CPTII,,, | Performed by: INTERNAL MEDICINE

## 2022-11-29 PROCEDURE — 80048 BASIC METABOLIC PNL TOTAL CA: CPT | Performed by: INTERNAL MEDICINE

## 2022-11-29 PROCEDURE — 1111F DSCHRG MED/CURRENT MED MERGE: CPT | Mod: CPTII,,, | Performed by: INTERNAL MEDICINE

## 2022-11-29 PROCEDURE — 3078F DIAST BP <80 MM HG: CPT | Mod: CPTII,,, | Performed by: INTERNAL MEDICINE

## 2022-11-29 PROCEDURE — 3008F BODY MASS INDEX DOCD: CPT | Mod: CPTII,,, | Performed by: INTERNAL MEDICINE

## 2022-11-29 PROCEDURE — 3008F PR BODY MASS INDEX (BMI) DOCUMENTED: ICD-10-PCS | Mod: CPTII,,, | Performed by: INTERNAL MEDICINE

## 2022-11-29 PROCEDURE — 3074F SYST BP LT 130 MM HG: CPT | Mod: CPTII,,, | Performed by: INTERNAL MEDICINE

## 2022-11-29 PROCEDURE — 3078F PR MOST RECENT DIASTOLIC BLOOD PRESSURE < 80 MM HG: ICD-10-PCS | Mod: CPTII,,, | Performed by: INTERNAL MEDICINE

## 2022-11-29 PROCEDURE — 1111F PR DISCHARGE MEDS RECONCILED W/ CURRENT OUTPATIENT MED LIST: ICD-10-PCS | Mod: CPTII,,, | Performed by: INTERNAL MEDICINE

## 2022-11-29 PROCEDURE — 3062F POS MACROALBUMINURIA REV: CPT | Mod: CPTII,,, | Performed by: INTERNAL MEDICINE

## 2022-11-29 PROCEDURE — 3062F PR POS MACROALBUMINURIA RESULT DOCUMENTED/REVIEW: ICD-10-PCS | Mod: CPTII,,, | Performed by: INTERNAL MEDICINE

## 2022-11-29 PROCEDURE — 99215 OFFICE O/P EST HI 40 MIN: CPT | Mod: S$PBB,,, | Performed by: INTERNAL MEDICINE

## 2022-11-29 PROCEDURE — 99214 OFFICE O/P EST MOD 30 MIN: CPT | Mod: PBBFAC,PO | Performed by: INTERNAL MEDICINE

## 2022-11-29 PROCEDURE — 1159F MED LIST DOCD IN RCRD: CPT | Mod: CPTII,,, | Performed by: INTERNAL MEDICINE

## 2022-11-29 PROCEDURE — 99999 PR PBB SHADOW E&M-EST. PATIENT-LVL IV: ICD-10-PCS | Mod: PBBFAC,,, | Performed by: INTERNAL MEDICINE

## 2022-11-29 PROCEDURE — 36415 COLL VENOUS BLD VENIPUNCTURE: CPT | Performed by: INTERNAL MEDICINE

## 2022-11-29 PROCEDURE — 1160F RVW MEDS BY RX/DR IN RCRD: CPT | Mod: CPTII,,, | Performed by: INTERNAL MEDICINE

## 2022-11-29 PROCEDURE — 1160F PR REVIEW ALL MEDS BY PRESCRIBER/CLIN PHARMACIST DOCUMENTED: ICD-10-PCS | Mod: CPTII,,, | Performed by: INTERNAL MEDICINE

## 2022-11-29 PROCEDURE — 1159F PR MEDICATION LIST DOCUMENTED IN MEDICAL RECORD: ICD-10-PCS | Mod: CPTII,,, | Performed by: INTERNAL MEDICINE

## 2022-11-29 PROCEDURE — 3066F PR DOCUMENTATION OF TREATMENT FOR NEPHROPATHY: ICD-10-PCS | Mod: CPTII,,, | Performed by: INTERNAL MEDICINE

## 2022-11-29 PROCEDURE — 99215 PR OFFICE/OUTPT VISIT, EST, LEVL V, 40-54 MIN: ICD-10-PCS | Mod: S$PBB,,, | Performed by: INTERNAL MEDICINE

## 2022-11-29 PROCEDURE — 99999 PR PBB SHADOW E&M-EST. PATIENT-LVL IV: CPT | Mod: PBBFAC,,, | Performed by: INTERNAL MEDICINE

## 2022-11-29 PROCEDURE — 3074F PR MOST RECENT SYSTOLIC BLOOD PRESSURE < 130 MM HG: ICD-10-PCS | Mod: CPTII,,, | Performed by: INTERNAL MEDICINE

## 2022-11-29 NOTE — PROGRESS NOTES
[facility]   New Visit Progress Note   Recent Hospital Discharge     PRESENTING HISTORY     Chief Complaint/Reason for Admission:  Follow up Hospital Discharge   PCP: Analy Encarnacion MD    History of Present Illness:  Mr. Domingo Gross is a 62 y.o. male who was recently admitted to the hospital.    [facility] Medicine  Discharge Summary        Patient Name: Domingo Gross  MRN: [id number]  NAOMI: [id number]  Patient Class: IP- Inpatient  Admission Date: 11/12/2022  Hospital Length of Stay: 1 days  Discharge Date and Time:  11/14/2022 2:16 PM  Attending Physician: Pawan Garcia, *   Discharging Provider: Raissa Peres NP  Primary Care Provider: Analy Encarnacion MD  ___________________________________________________________________    Today:  Presents to [facility] for initial hospital follow up.  Recently hospitalized for management of chest pain.  In A fib on arrival.  Condition complicated by anemia with H&H  6.4/19/4.  Patient admitted to [facility] Medicine service.  Transfused 1 U PRBC.  Seen in consultation with Cardiology team.   JOSE with DCCV scheduled but later deferred to outpatient setting.  Patient with hx profound anemia and unable to tolerate anticoagulation.   Patient discharged to home.    Had cardiology follow up 11/28/22- notes reviewed.   In regards to A fib management- referral to discuss MIRIAN vs chronic anticoagulation.  Compression stockings prescribed for chronic venous insufficiency.     Patient unaccompanied today.  Ambulatory and independent with ADL's.  Self discontinued oral potassium supplementation.  Hx outpatient iron infusions and Retacrit managed by heme onc team but he was lost to follow up.  Also lost to outpatient Nephrology follow up ~ years ago.      Review of Systems  General ROS: negative for chills, fever or weight loss  Psychological ROS: negative for hallucination, depression or suicidal ideation  Ophthalmic ROS: negative  for blurry vision, photophobia or eye pain  ENT ROS: negative for epistaxis, sore throat or rhinorrhea  Respiratory ROS: no cough, shortness of breath, or wheezing  Cardiovascular ROS: no chest pain or dyspnea on exertion  Gastrointestinal ROS: no abdominal pain, change in bowel habits, or black/ bloody stools  Genito-Urinary ROS: no dysuria, trouble voiding, or hematuria  Musculoskeletal ROS: negative for gait disturbance or muscular weakness  Neurological ROS: no syncope or seizures; no ataxia  Dermatological ROS: negative for pruritis, rash and jaundice          PAST HISTORY:     Past Medical History:   Diagnosis Date    Allergy     Anemia     Anticoagulant long-term use     Arthritis     CKD (chronic kidney disease) stage 4, GFR 15-29 ml/min     COPD (chronic obstructive pulmonary disease) 08/20/2021    Coronary artery disease     Heart attack 10/04/2019    Hematuria     Hemothorax     Hyperlipidemia     Hypertension     Hyperuricemia     Hypocalcemia     Hypokalemia     Hypophosphatemia     PAD (peripheral artery disease)     Proteinuria     Vitamin D deficiency        Past Surgical History:   Procedure Laterality Date    ABDOMINAL AORTOGRAPHY N/A 5/10/2019    Procedure: AORTOGRAM-ABDOMINAL;  Surgeon: Keo Jenkins MD;  Location: [facility] CATH LAB/EP;  Service: Cardiology;  Laterality: N/A;  RSFA intervention     AORTOGRAPHY WITH SERIALOGRAPHY N/A 12/3/2021    Procedure: AORTOGRAM, WITH SERIALOGRAPHY;  Surgeon: Keo Jenkins MD;  Location: [facility] CATH LAB/EP;  Service: Cardiology;  Laterality: N/A;    AORTOGRAPHY WITH SERIALOGRAPHY N/A 4/1/2022    Procedure: AORTOGRAM, WITH SERIALOGRAPHY;  Surgeon: Keo Jenkins MD;  Location: [facility] CATH LAB/EP;  Service: Cardiology;  Laterality: N/A;    BONE MARROW BIOPSY Right 10/12/2021    Procedure: BIOPSY-BONE MARROW;  Surgeon: Naseem Woods MD;  Location: [facility] OR;  Service: Oncology;  Laterality: Right;    CARDIAC CATHETERIZATION      CATARACT EXTRACTION      CATARACT  EXTRACTION W/  INTRAOCULAR LENS IMPLANT Right 6/8/2020    Procedure: EXTRACTION, CATARACT, WITH IOL INSERTION;  Surgeon: Tin Light MD;  Location: [facility] OR;  Service: Ophthalmology;  Laterality: Right;    CATARACT EXTRACTION W/  INTRAOCULAR LENS IMPLANT Left 7/2/2020    Procedure: EXTRACTION, CATARACT, WITH IOL INSERTION;  Surgeon: Tin Light MD;  Location: [facility] OR;  Service: Ophthalmology;  Laterality: Left;    COLONOSCOPY N/A 1/6/2022    Procedure: COLONOSCOPY;  Surgeon: Kathe Penaloza MD;  Location: [facility] (2ND FLR);  Service: Endoscopy;  Laterality: N/A;  COVID test at [facility] on 1/3-GT  okay to hold Plavix for 5 days and aspirin per Dr. Becerra  2nd floor due toextensive cardiac history   instructions mailed and my [facility] portal -     CORONARY ANGIOGRAPHY N/A 3/29/2019    Procedure: ANGIOGRAM, CORONARY ARTERY;  Surgeon: Keo Jenkins MD;  Location: [facility] CATH LAB/EP;  Service: Cardiology;  Laterality: N/A;    CORONARY ANGIOGRAPHY Left 10/11/2019    Procedure: ANGIOGRAM, CORONARY ARTERY;  Surgeon: Keo Jenkins MD;  Location: [facility] CATH LAB/EP;  Service: Cardiology;  Laterality: Left;    CORONARY ANGIOPLASTY WITH STENT PLACEMENT  03/29/2019    mid and distal RCA    ESOPHAGOGASTRODUODENOSCOPY N/A 1/6/2022    Procedure: EGD (ESOPHAGOGASTRODUODENOSCOPY);  Surgeon: Kathe Penaloza MD;  Location: [facility] (2ND FLR);  Service: Endoscopy;  Laterality: N/A;    EYE SURGERY      INTRAVASCULAR ULTRASOUND, NON-CORONARY  8/12/2022    Procedure: Intravascular Ultrasound, Non-Coronary;  Surgeon: Keo Jenknis MD;  Location: [facility] CATH LAB/EP;  Service: Cardiology;;    LEFT HEART CATHETERIZATION N/A 3/29/2019    Procedure: Left heart cath;  Surgeon: Keo Jenkins MD;  Location: [facility] CATH LAB/EP;  Service: Cardiology;  Laterality: N/A;    LEFT HEART CATHETERIZATION Left 10/8/2019    Procedure: Left heart cath;  Surgeon: Keo Jenkins MD;  Location: [facility] CATH LAB/EP;  Service: Cardiology;  Laterality: Left;     PERCUTANEOUS TRANSLUMINAL ANGIOPLASTY (PTA) OF PERIPHERAL VESSEL N/A 7/12/2019    Procedure: PTA, PERIPHERAL VESSEL;  Surgeon: eKo Jenkins MD;  Location: [facility] CATH LAB/EP;  Service: Cardiology;  Laterality: N/A;    PERCUTANEOUS TRANSLUMINAL ANGIOPLASTY (PTA) OF PERIPHERAL VESSEL Left 5/20/2022    Procedure: PTA, PERIPHERAL VESSEL;  Surgeon: Keo Jenkins MD;  Location: [facility] CATH LAB/EP;  Service: Cardiology;  Laterality: Left;    PERCUTANEOUS TRANSLUMINAL ANGIOPLASTY (PTA) OF PERIPHERAL VESSEL Right 8/12/2022    Procedure: PTA, PERIPHERAL VESSEL;  Surgeon: Keo Jenkins MD;  Location: [facility] CATH LAB/EP;  Service: Cardiology;  Laterality: Right;       Family History   Problem Relation Age of Onset    Aneurysm Mother     Hypertension Mother     Heart disease Mother     Cancer Father     Diabetes Sister     Hypertension Sister     No Known Problems Brother     No Known Problems Son          MEDICATIONS & ALLERGIES:     Current Outpatient Medications on File Prior to Visit   Medication Sig Dispense Refill    albuterol (VENTOLIN HFA) 90 mcg/actuation inhaler Inhale 2 puffs into the lungs every 6 (six) hours as needed for Wheezing. Rescue 18 g 0    ASPIRIN (ASPIR-81 ORAL) Take 81 mg by mouth once daily.      carvediloL (COREG) 25 MG tablet Take 1 tablet (25 mg total) by mouth 2 (two) times daily with meals. 60 tablet 11    clopidogreL (PLAVIX) 75 mg tablet Take 1 tablet (75 mg total) by mouth once daily. 30 tablet 11    coenzyme Q10 100 mg capsule Take 100 mg by mouth once daily.      eplerenone (INSPRA) 50 MG Tab Take 1 tablet (50 mg total) by mouth once daily. 90 tablet 3    furosemide (LASIX) 40 MG tablet Take 1 tablet (40 mg total) by mouth once daily. 30 tablet 0    potassium chloride SA (K-DUR,KLOR-CON) 20 MEQ tablet Take 2 tablets (40 mEq total) by mouth 3 (three) times daily. 180 tablet 0    rosuvastatin (CRESTOR) 40 MG Tab Take 1 tablet (40 mg total) by mouth every evening. 90 tablet 3     Current  Facility-Administered Medications on File Prior to Visit   Medication Dose Route Frequency Provider Last Rate Last Admin    sodium chloride 0.9% infusion   Intravenous Continuous PRN Bushra Cervantes CRNA   New Bag at 11/14/22 1033        Review of patient's allergies indicates:   Allergen Reactions    Ace inhibitors Rash       OBJECTIVE:     Vital Signs:  /65 (BP Location: Left arm, Patient Position: Sitting, BP Method: Small (Automatic))   Pulse 72   Temp 98 °F (36.7 °C) (Oral)   Wt 77.1 kg (169 lb 15.6 oz)   SpO2 98%   BMI 24.39 kg/m²   Wt Readings from Last 3 Encounters:   11/29/22 1451 77.1 kg (169 lb 15.6 oz)   11/28/22 1201 77 kg (169 lb 12.1 oz)   11/12/22 1121 74.4 kg (164 lb)   11/12/22 0426 74.6 kg (164 lb 7.4 oz)   11/12/22 0042 81.6 kg (180 lb)     Body mass index is 24.39 kg/m².   98%    Physical Exam:  /65 (BP Location: Left arm, Patient Position: Sitting, BP Method: Small (Automatic))   Pulse 72   Temp 98 °F (36.7 °C) (Oral)   Wt 77.1 kg (169 lb 15.6 oz)   SpO2 98%   BMI 24.39 kg/m²   General appearance: alert, cooperative, no distress  Constitutional:Oriented to person, place, and time  + appears well-developed and well-nourished.   HEENT: Normocephalic, atraumatic, neck symmetrical, no nasal discharge   Eyes: conjunctivae/corneas clear, PERRL, EOM's intact  Lungs: clear to auscultation bilaterally, no dullness to percussion bilaterally  Heart: regular rate and rhythm without rub; no displacement of the PMI   Abdomen: soft, non-tender; bowel sounds normoactive; no organomegaly  Extremities: extremities symmetric; no clubbing, cyanosis, or edema  Integument: Skin color, texture, turgor normal; no rashes; hair distrubution normal  Neurologic: Alert and oriented X 3, normal strength, normal coordination and gait  Psychiatric: no pressured speech; normal affect; no evidence of impaired cognition     Laboratory  Lab Results   Component Value Date    WBC 6.44 11/14/2022    HGB 9.0  (L) 11/14/2022    HCT 26.0 (L) 11/14/2022    MCV 81 (L) 11/14/2022     11/14/2022     BMP  Lab Results   Component Value Date     11/22/2022    K 5.0 11/22/2022     11/22/2022    CO2 21 (L) 11/22/2022    BUN 27 (H) 11/22/2022    CREATININE 2.7 (H) 11/22/2022    CALCIUM 9.7 11/22/2022    ANIONGAP 10 11/22/2022    EGFRNORACEVR 26.0 (A) 11/22/2022     Lab Results   Component Value Date    ALT 14 11/14/2022    AST 18 11/14/2022    ALKPHOS 65 11/14/2022    BILITOT 0.6 11/14/2022     Lab Results   Component Value Date    INR 1.2 07/15/2021    INR 1.0 03/20/2020    INR 1.0 01/30/2020     Lab Results   Component Value Date    HGBA1C 5.7 (H) 10/28/2019       Diagnostic Results:    2 D echo 11/12/22:  The left ventricle is normal in size with mild concentric hypertrophy and normal systolic function.  The estimated ejection fraction is 55%.  A diastolic pattern consistent with atrial fibrillation observed.  Mild mitral regurgitation.  Mild tricuspid regurgitation.  Mild pulmonic regurgitation.  Normal right ventricular size with normal right ventricular systolic function.  There is no pulmonary hypertension.  Small anterior pericardial effusion.    ASSESSMENT & PLAN:     Paroxysmal atrial fibrillation  - poor candidate for long term anticoagulation due to anemia  - cardiology team considering MIRIAN procedure     Anemia due to stage 4 chronic kidney disease  - required 1 U PRBC while hospitalized   - previously received iron infusion and Retacrit but lost to Heme Onc follow up  - heme onc appt arranged for 12/27/22   -     Ambulatory referral/consult to Hematology / Oncology; Future; Expected date: 12/06/2022    CKD (chronic kidney disease) stage 4, GFR 15-29 ml/min  - lost to Nephrology follow up  - will see Dr Payne 12/1/22 to establish new Nephrology care    Hypokalemia  - patient with hx hypokalemia , prescribed K Dur 40 meq TID which he self discontinued about one week prior  - labs today   -     Basic  Metabolic Panel; Future; Expected date: 11/29/2022    Labs today.  [facility] will [facility] from [facility].  He will see his PCP, Dr Encarnacion, 3/2/23.   Instructions for the patient:      Scheduled Follow-up :  Future Appointments   Date Time Provider Department Center   11/29/2022  4:00 PM APPOINTMENT LAB, [facility]   12/1/2022  1:00 PM Loly Payne MD [facility]   12/21/2022  7:45 AM LAB, [facility] [place]   12/27/2022  1:00 PM Robby Reddy MD [facility] HEM ONC [facility]   2/27/2023  8:20 AM LAB, [facility] [place]   3/2/2023  2:40 PM Analy Encarnacion MD [facility]       Post Visit Medication List:     Medication List            Accurate as of November 29, 2022  3:52 PM. If you have any questions, ask your nurse or doctor.                CONTINUE taking these medications      albuterol 90 mcg/actuation inhaler  Commonly known as: VENTOLIN HFA  Inhale 2 puffs into the lungs every 6 (six) hours as needed for Wheezing. Rescue     ASPIR-81 ORAL     carvediloL 25 MG tablet  Commonly known as: COREG  Take 1 tablet (25 mg total) by mouth 2 (two) times daily with meals.     clopidogreL 75 mg tablet  Commonly known as: PLAVIX  Take 1 tablet (75 mg total) by mouth once daily.     coenzyme Q10 100 mg capsule     eplerenone 50 MG Tab  Commonly known as: INSPRA  Take 1 tablet (50 mg total) by mouth once daily.     furosemide 40 MG tablet  Commonly known as: LASIX  Take 1 tablet (40 mg total) by mouth once daily.     rosuvastatin 40 MG Tab  Commonly known as: CRESTOR  Take 1 tablet (40 mg total) by mouth every evening.              Signing Physician:  Adelaide Puga MD

## 2022-11-30 ENCOUNTER — TELEPHONE (OUTPATIENT)
Dept: PRIMARY CARE CLINIC | Facility: CLINIC | Age: 62
End: 2022-11-30
Payer: MEDICAID

## 2022-12-01 ENCOUNTER — PATIENT MESSAGE (OUTPATIENT)
Dept: INTERNAL MEDICINE | Facility: CLINIC | Age: 62
End: 2022-12-01
Payer: MEDICAID

## 2022-12-04 ENCOUNTER — PATIENT MESSAGE (OUTPATIENT)
Dept: CARDIOLOGY | Facility: CLINIC | Age: 62
End: 2022-12-04
Payer: MEDICAID

## 2022-12-12 ENCOUNTER — TELEPHONE (OUTPATIENT)
Dept: ELECTROPHYSIOLOGY | Facility: CLINIC | Age: 62
End: 2022-12-12
Payer: MEDICAID

## 2022-12-12 ENCOUNTER — TELEPHONE (OUTPATIENT)
Dept: ADMINISTRATIVE | Facility: OTHER | Age: 62
End: 2022-12-12
Payer: MEDICAID

## 2022-12-12 ENCOUNTER — PATIENT MESSAGE (OUTPATIENT)
Dept: INTERNAL MEDICINE | Facility: CLINIC | Age: 62
End: 2022-12-12
Payer: MEDICAID

## 2022-12-12 RX ORDER — FUROSEMIDE 40 MG/1
40 TABLET ORAL DAILY
Qty: 30 TABLET | Refills: 0 | Status: CANCELLED | OUTPATIENT
Start: 2022-12-12 | End: 2023-01-11

## 2022-12-12 NOTE — TELEPHONE ENCOUNTER
----- Message from Kori Domínguez sent at 12/12/2022  9:47 AM CST -----  Regarding:  Pt  Good Morning,     The patient has a referral from Dr.N' Matos , the diagnosis is Paroxysmal atrial fibrillation [I48.0 ; the pt has Medicaid. Can someone  help me  please call & schedule a appointment for the patient.      Thank you      Subjective     History:   Abbi Mendez is a 84 y.o. female admitted on 6/4/2019 secondary to <principal problem not specified>     Procedures:   6/5/19: Left hip bipolar replacement     CC: Follow up hip fracture     Patient seen and examined with FANNY Norwood. Awake and alert. Currently sitting in bedside chair. States she did not sleep well last PM but denies any other complaints. Reports her pain is controlled. Denies CP, dyspnea or palpitations. Denies nausea or vomiting. Denies fever or chills. No acute events overnight per RN.     History taken from: patient, chart, and RN.      Objective     Vital Signs  Temp:  [97.8 °F (36.6 °C)-98.9 °F (37.2 °C)] 98.3 °F (36.8 °C)  Heart Rate:  [69-76] 75  Resp:  [17-18] 18  BP: ()/(50-70) 137/67    Intake/Output Summary (Last 24 hours) at 6/12/2019 1737  Last data filed at 6/12/2019 1700  Gross per 24 hour   Intake 1060 ml   Output --   Net 1060 ml         Physical Exam:  General:    Awake, alert, in no acute distress   Heart:      Normal S1 and S2. Regular rate and rhythm. No significant murmur, rubs or gallops appreciated.   Lungs:     Respirations regular, even and unlabored. Lungs clear to auscultation B/L. No wheezes, rales or rhonchi.   Abdomen:   Soft and nontender. No guarding, rebound tenderness or  organomegaly noted. Bowel sounds present x 4.   Extremities:  Mild bilateral lower extremity edema. Moves UE and LE equally B/L.     Results Review:    Results from last 7 days   Lab Units 06/12/19  0523 06/11/19  0456 06/10/19  0149 06/09/19  0455 06/08/19  0604 06/07/19  0817 06/06/19  0415   WBC 10*3/mm3 11.29* 11.82* 12.15* 11.91* 12.61* 15.11* 14.64*   HEMOGLOBIN g/dL 10.3* 10.9* 10.8* 10.5* 10.6* 10.7* 10.5*   PLATELETS 10*3/mm3 411 423 396 380 361 358 324     Results from last 7 days   Lab Units 06/12/19  0523 06/11/19  0456 06/10/19  0136 06/09/19  0455 06/08/19  0604 06/07/19  2155 06/07/19  0817 06/06/19  0415   SODIUM mmol/L 139 138 138 138 138  --   136 140   POTASSIUM mmol/L 3.8 3.9 4.1 3.7 4.6 4.4 3.6 4.0   CHLORIDE mmol/L 101 101 102 103 104  --  103 104   CO2 mmol/L 27.2 25.7 24.8 23.7 24.0  --  20.5* 23.3   BUN mg/dL 18 19 19 17 16  --  23 25*   CREATININE mg/dL 0.88 0.95 1.01* 0.91 0.83  --  1.03* 1.26*   CALCIUM mg/dL 8.4* 8.4* 7.9* 7.6* 7.4*  --  7.4* 7.4*   GLUCOSE mg/dL 104* 105* 114* 108* 104*  --  144* 201*         Results from last 7 days   Lab Units 06/12/19  0523 06/11/19  0456 06/10/19  0136 06/09/19  0455 06/08/19  0604 06/06/19  0802   MAGNESIUM mg/dL 1.6 2.0 1.6 2.1 1.5* 2.5*               Imaging Results (last 24 hours)     ** No results found for the last 24 hours. **            Medications:    apixaban 2.5 mg Oral Q12H   atorvastatin 40 mg Oral Nightly   busPIRone 10 mg Oral BID   carvedilol 12.5 mg Oral BID With Meals   cholecalciferol 50,000 Units Oral Weekly   losartan 100 mg Oral Daily   pantoprazole 40 mg Oral QAM   sodium chloride 3 mL Intravenous Q12H              Assessment/Plan   Acute traumatic left hip fracture: S/P mechanical fall at home. S/P hip replacement. PT/OT. Ortho input appreciated.     Leukocytosis: Possibly related to hip fracture and surgical repair. Initial concern for UTI but urine culture revealed mixed markus. Afebrile and clinically stable. Cont to monitor.     Electrolyte abnormalities: K+ improved with supplementation. Mg is <2 and has been replaced.     Essential HTN: BP has been consistently elevated but improved today. Will order a dose of IV Lasix today as she is edematous. Cont current regimen and monitor.     DVT PPX: Eliquis    Disposition: Awaiting approval for swing bed placement.       Jeffrey Parsons DO  06/12/19  5:37 PM

## 2022-12-12 NOTE — TELEPHONE ENCOUNTER
Spoke with pt to ask new pt questions. No device or outside records. Pt wants the 8am but will be a little late because he has to drop off his grandson at school.

## 2022-12-16 DIAGNOSIS — I48.0 PAROXYSMAL ATRIAL FIBRILLATION: Primary | ICD-10-CM

## 2022-12-20 RX ORDER — FUROSEMIDE 40 MG/1
40 TABLET ORAL DAILY
Qty: 30 TABLET | Refills: 0 | OUTPATIENT
Start: 2022-12-20 | End: 2023-01-19

## 2022-12-23 ENCOUNTER — LAB VISIT (OUTPATIENT)
Dept: LAB | Facility: HOSPITAL | Age: 62
End: 2022-12-23
Attending: INTERNAL MEDICINE
Payer: MEDICAID

## 2022-12-23 DIAGNOSIS — E87.6 HYPOKALEMIA: ICD-10-CM

## 2022-12-23 LAB
ANION GAP SERPL CALC-SCNC: 9 MMOL/L (ref 8–16)
BUN SERPL-MCNC: 20 MG/DL (ref 8–23)
CALCIUM SERPL-MCNC: 8.7 MG/DL (ref 8.7–10.5)
CHLORIDE SERPL-SCNC: 110 MMOL/L (ref 95–110)
CO2 SERPL-SCNC: 25 MMOL/L (ref 23–29)
CREAT SERPL-MCNC: 2.2 MG/DL (ref 0.5–1.4)
EST. GFR  (NO RACE VARIABLE): 33.2 ML/MIN/1.73 M^2
GLUCOSE SERPL-MCNC: 108 MG/DL (ref 70–110)
POTASSIUM SERPL-SCNC: 3.5 MMOL/L (ref 3.5–5.1)
SODIUM SERPL-SCNC: 144 MMOL/L (ref 136–145)

## 2022-12-23 PROCEDURE — 36415 COLL VENOUS BLD VENIPUNCTURE: CPT | Mod: PO | Performed by: INTERNAL MEDICINE

## 2022-12-23 PROCEDURE — 80048 BASIC METABOLIC PNL TOTAL CA: CPT | Performed by: INTERNAL MEDICINE

## 2022-12-27 ENCOUNTER — OFFICE VISIT (OUTPATIENT)
Dept: HEMATOLOGY/ONCOLOGY | Facility: CLINIC | Age: 62
End: 2022-12-27
Payer: MEDICAID

## 2022-12-27 VITALS
TEMPERATURE: 98 F | SYSTOLIC BLOOD PRESSURE: 117 MMHG | DIASTOLIC BLOOD PRESSURE: 65 MMHG | RESPIRATION RATE: 16 BRPM | HEART RATE: 79 BPM | BODY MASS INDEX: 25.66 KG/M2 | OXYGEN SATURATION: 99 % | WEIGHT: 179.25 LBS | HEIGHT: 70 IN

## 2022-12-27 DIAGNOSIS — N18.4 CKD (CHRONIC KIDNEY DISEASE) STAGE 4, GFR 15-29 ML/MIN: ICD-10-CM

## 2022-12-27 DIAGNOSIS — D63.1 ANEMIA DUE TO STAGE 4 CHRONIC KIDNEY DISEASE: Primary | ICD-10-CM

## 2022-12-27 DIAGNOSIS — N18.4 ANEMIA DUE TO STAGE 4 CHRONIC KIDNEY DISEASE: Primary | ICD-10-CM

## 2022-12-27 DIAGNOSIS — I25.10 CORONARY ARTERY DISEASE INVOLVING NATIVE CORONARY ARTERY OF NATIVE HEART WITHOUT ANGINA PECTORIS: ICD-10-CM

## 2022-12-27 DIAGNOSIS — I10 PRIMARY HYPERTENSION: ICD-10-CM

## 2022-12-27 DIAGNOSIS — I48.0 PAROXYSMAL ATRIAL FIBRILLATION: ICD-10-CM

## 2022-12-27 PROCEDURE — 99999 PR PBB SHADOW E&M-EST. PATIENT-LVL III: CPT | Mod: PBBFAC,,, | Performed by: INTERNAL MEDICINE

## 2022-12-27 PROCEDURE — 1159F PR MEDICATION LIST DOCUMENTED IN MEDICAL RECORD: ICD-10-PCS | Mod: CPTII,,, | Performed by: INTERNAL MEDICINE

## 2022-12-27 PROCEDURE — 3078F PR MOST RECENT DIASTOLIC BLOOD PRESSURE < 80 MM HG: ICD-10-PCS | Mod: CPTII,,, | Performed by: INTERNAL MEDICINE

## 2022-12-27 PROCEDURE — 3066F NEPHROPATHY DOC TX: CPT | Mod: CPTII,,, | Performed by: INTERNAL MEDICINE

## 2022-12-27 PROCEDURE — 3008F BODY MASS INDEX DOCD: CPT | Mod: CPTII,,, | Performed by: INTERNAL MEDICINE

## 2022-12-27 PROCEDURE — 99214 OFFICE O/P EST MOD 30 MIN: CPT | Mod: S$PBB,,, | Performed by: INTERNAL MEDICINE

## 2022-12-27 PROCEDURE — 3078F DIAST BP <80 MM HG: CPT | Mod: CPTII,,, | Performed by: INTERNAL MEDICINE

## 2022-12-27 PROCEDURE — 3074F SYST BP LT 130 MM HG: CPT | Mod: CPTII,,, | Performed by: INTERNAL MEDICINE

## 2022-12-27 PROCEDURE — 3066F PR DOCUMENTATION OF TREATMENT FOR NEPHROPATHY: ICD-10-PCS | Mod: CPTII,,, | Performed by: INTERNAL MEDICINE

## 2022-12-27 PROCEDURE — 3074F PR MOST RECENT SYSTOLIC BLOOD PRESSURE < 130 MM HG: ICD-10-PCS | Mod: CPTII,,, | Performed by: INTERNAL MEDICINE

## 2022-12-27 PROCEDURE — 99999 PR PBB SHADOW E&M-EST. PATIENT-LVL III: ICD-10-PCS | Mod: PBBFAC,,, | Performed by: INTERNAL MEDICINE

## 2022-12-27 PROCEDURE — 1159F MED LIST DOCD IN RCRD: CPT | Mod: CPTII,,, | Performed by: INTERNAL MEDICINE

## 2022-12-27 PROCEDURE — 99214 PR OFFICE/OUTPT VISIT, EST, LEVL IV, 30-39 MIN: ICD-10-PCS | Mod: S$PBB,,, | Performed by: INTERNAL MEDICINE

## 2022-12-27 PROCEDURE — 99213 OFFICE O/P EST LOW 20 MIN: CPT | Mod: PBBFAC,PO | Performed by: INTERNAL MEDICINE

## 2022-12-27 PROCEDURE — 3062F PR POS MACROALBUMINURIA RESULT DOCUMENTED/REVIEW: ICD-10-PCS | Mod: CPTII,,, | Performed by: INTERNAL MEDICINE

## 2022-12-27 PROCEDURE — 3008F PR BODY MASS INDEX (BMI) DOCUMENTED: ICD-10-PCS | Mod: CPTII,,, | Performed by: INTERNAL MEDICINE

## 2022-12-27 PROCEDURE — 3062F POS MACROALBUMINURIA REV: CPT | Mod: CPTII,,, | Performed by: INTERNAL MEDICINE

## 2022-12-27 NOTE — PROGRESS NOTES
PATIENT: Domingo Gross  MRN: [id number]  DATE: 12/27/2022    Subjective:     Chief complaint:No chief complaint on file.      Interval History: Mr. Gross returns for follow up on anemia associated with CKD.  He had previously been followed by Dr. Woods; in the past he has been on Epo injections however has not been on this since approximately March 2022. His most recent blood work showed Hgb of 9.0 on 11/14/22. He denies SOB, SHIRLEY, chest pain, light headedn-ess/dizziness. No changes to his functional capacity at this time. He denies bleeding or blood in stool/urine. He presently denies questions or concerns.     He is without acute complaints at this time.   Domingo denies any nausea, vomiting, diarrhea, constipation, abdominal pain, weight loss or loss of appetite, chest pain, shortness of breath, leg swelling, fatigue, pain, headache, dizziness, or mood changes.       Review of Systems   Review of Systems   Constitutional:  Negative for activity change, appetite change, fatigue, fever and unexpected weight change.   HENT:  Negative for congestion, postnasal drip and sore throat.    Eyes:  Negative for pain and visual disturbance.   Respiratory:  Negative for cough and shortness of breath.    Cardiovascular:  Negative for chest pain and palpitations.   Gastrointestinal:  Negative for abdominal pain, blood in stool, constipation, diarrhea, nausea and vomiting.   Endocrine: Negative for cold intolerance and heat intolerance.   Genitourinary:  Negative for difficulty urinating, dysuria and hematuria.   Musculoskeletal:  Negative for arthralgias, back pain, joint swelling and myalgias.   Skin:  Negative for color change and rash.   Allergic/Immunologic: Negative for environmental allergies.   Neurological:  Negative for weakness, light-headedness, numbness and headaches.   Hematological:  Negative for adenopathy. Does not bruise/bleed easily.   Psychiatric/Behavioral:  Negative for dysphoric mood. The patient is not  "nervous/anxious.        ECOG Performance Status:   ECOG SCORE    1 - Restricted in strenuous activity-ambulatory and able to carry out work of a light nature         Objective:      Vitals:   Vitals:    12/27/22 1305   BP: 117/65   BP Location: Right arm   Patient Position: Sitting   BP Method: Medium (Automatic)   Pulse: 79   Resp: 16   Temp: 97.9 °F (36.6 °C)   TempSrc: Oral   SpO2: 99%   Weight: 81.3 kg (179 lb 3.7 oz)   Height: 5' 10" (1.778 m)     BMI: Body mass index is 25.72 kg/m².      Physical Exam:   Physical Exam  Vitals and nursing note reviewed.   Constitutional:       General: He is not in acute distress.     Appearance: Normal appearance. He is normal weight. He is not toxic-appearing.   HENT:      Head: Normocephalic and atraumatic.   Eyes:      General: No scleral icterus.     Conjunctiva/sclera: Conjunctivae normal.   Cardiovascular:      Rate and Rhythm: Normal rate and regular rhythm.      Heart sounds: Normal heart sounds.   Pulmonary:      Effort: Pulmonary effort is normal.      Breath sounds: Normal breath sounds. No wheezing, rhonchi or rales.   Abdominal:      General: Abdomen is flat. Bowel sounds are normal.      Palpations: Abdomen is soft.      Tenderness: There is no abdominal tenderness.   Musculoskeletal:         General: No tenderness or deformity.      Cervical back: Neck supple.   Lymphadenopathy:      Cervical: No cervical adenopathy.   Skin:     General: Skin is warm and dry.      Coloration: Skin is not jaundiced.      Findings: No bruising or erythema.   Neurological:      General: No focal deficit present.      Mental Status: He is alert and oriented to person, place, and time. Mental status is at baseline.   Psychiatric:         Mood and Affect: Mood normal.         Behavior: Behavior normal.         Thought Content: Thought content normal.         Laboratory Data:  WBC   Date Value Ref Range Status   11/14/2022 6.44 3.90 - 12.70 K/uL Final     Hemoglobin   Date Value Ref " Range Status   11/14/2022 9.0 (L) 14.0 - 18.0 g/dL Final     Hematocrit   Date Value Ref Range Status   11/14/2022 26.0 (L) 40.0 - 54.0 % Final     Platelets   Date Value Ref Range Status   11/14/2022 188 150 - 450 K/uL Final     Gran # (ANC)   Date Value Ref Range Status   11/14/2022 4.0 1.8 - 7.7 K/uL Final     Gran %   Date Value Ref Range Status   11/14/2022 61.9 38.0 - 73.0 % Final       Chemistry        Component Value Date/Time     12/23/2022 0818    K 3.5 12/23/2022 0818     12/23/2022 0818    CO2 25 12/23/2022 0818    BUN 20 12/23/2022 0818    CREATININE 2.2 (H) 12/23/2022 0818     12/23/2022 0818        Component Value Date/Time    CALCIUM 8.7 12/23/2022 0818    ALKPHOS 65 11/14/2022 0238    AST 18 11/14/2022 0238    ALT 14 11/14/2022 0238    BILITOT 0.6 11/14/2022 0238    ESTGFRAFRICA 28 (A) 07/25/2022 0842    EGFRNONAA 24 (A) 07/25/2022 0842              Assessment/Plan:     1. Anemia due to stage 4 chronic kidney disease    2. CKD (chronic kidney disease) stage 4, GFR 15-29 ml/min    3. Primary hypertension    4. Coronary artery disease involving native coronary artery of native heart without angina pectoris    5. Paroxysmal atrial fibrillation     Anemia is stable, he has not received Epo since appox March 2022 and his hemoglobin has been relatively stable and he is without symptoms of anemia at the present time. He follows additionally with nephrology and cardiology.     For PAF, I think it is appropriate to resume his anticoagulation. I think his anemia is likely related to CKD and there is nothing strongly suggesting bleeding at present, and therefore I think resuming anticoagulation for PAF would be appropriate if needed.    Since he is asymptomatic there is no need to resume Epo but review his historical hemoglobin levels suggests that he is at risk of developing worse anemia and for this reason I would like to repeat the labs within a couple months and see him back in  clinic.    Repeat anemia labs prior to RTC in 2 months.     Med and Orders:  Orders Placed This Encounter    CBC auto differential    Iron and TIBC    FERRITIN    VITAMIN B12    FOLATE       Follow Up:  No follow-ups on file.    Patient instructions:   There are no Patient Instructions on file for this visit.    Above care plan was discussed with patient and all questions were addressed to their expressed satisfaction.       Robby Reddy MD, FACP  Hematology & Medical Oncology  [facility]

## 2023-01-05 NOTE — PROGRESS NOTES
Subjective:    Patient ID:  Domingo Gross is a 62 y.o. male who presents for evaluation of Atrial Fibrillation    Referring Cardiologist: Keo Jenkins MD  Primary Care Physician: Analy Encarnacion MD    HPI  I had the pleasure of seeing Mr. Gross today in our electrophysiology clinic in consultation for his atrial arrhythmia. As you are aware he is a pleasant 62 year-old man with hypertension, chronic kidney disease stage IV, peripheral arterial disease with multiple interventions on lower extremity arteries, coronary artery disease (mid LAD/prox RCA PCI 3/2019 and prox LCA in 10/2019 following out of hospital cardiac arrest), cardiac arrest in setting of prox LCX occlusion treated with PCI with normal LV function, chronic anemia reportedly secondary to chronic renal disease although has had heme + stools and EGD 1/2022 noted non-bleeding erosive gastropathy and colonoscopy noted hemorrhoids and polyps, and paroxysmal atrial fibrillation. He was first diagnosed with atrial fibrillation post-resuscitation for his out-of-hospital cardiac arrest (assume VF, required in field shock/CPR). He spontaneously converted. There is no ECG however of this in the system. Coronary angiography disclosed 99% prox-LCX stenosis.  He was hospitalized for chest discomfort and shortness of breath in November of 2022. He was observed to be in atrial fibrillation. He was not started on anticoagulation as his Hgb was 6.4. He was transfused. He is on plavix and aspirin. A follow-up outpatient ECG noted sinus rhythm.    ECHO 11/2022: preserved LV function, mild MR, normal LA size    I reviewed all available ECGs in Epic which show either sinus rhythm of AF.    My interpretation of today's in clinic ECG is AF with an average ventricular rate of 84 bpm.    Review of Systems   Constitutional: Negative for fever and malaise/fatigue.   HENT:  Negative for congestion and sore throat.    Eyes:  Negative for blurred vision and visual  disturbance.   Cardiovascular:  Negative for chest pain, dyspnea on exertion, irregular heartbeat, near-syncope, palpitations and syncope.   Respiratory:  Negative for cough and shortness of breath.    Hematologic/Lymphatic: Negative for bleeding problem. Does not bruise/bleed easily.   Skin: Negative.    Musculoskeletal: Negative.    Gastrointestinal:  Negative for bloating, abdominal pain, hematochezia and melena.   Neurological:  Negative for focal weakness and weakness.   Psychiatric/Behavioral: Negative.        Objective:    Physical Exam  Vitals reviewed.   Constitutional:       General: He is not in acute distress.     Appearance: He is well-developed. He is not diaphoretic.   HENT:      Head: Normocephalic and atraumatic.   Eyes:      General:         Right eye: No discharge.         Left eye: No discharge.      Conjunctiva/sclera: Conjunctivae normal.   Cardiovascular:      Rate and Rhythm: Normal rate and regular rhythm.      Heart sounds: No murmur heard.    No friction rub. No gallop.   Pulmonary:      Effort: Pulmonary effort is normal. No respiratory distress.      Breath sounds: Normal breath sounds. No wheezing or rales.   Abdominal:      General: Bowel sounds are normal. There is no distension.      Palpations: Abdomen is soft.      Tenderness: There is no abdominal tenderness.   Musculoskeletal:      Cervical back: Neck supple.   Skin:     General: Skin is warm and dry.   Neurological:      Mental Status: He is alert and oriented to person, place, and time.   Psychiatric:         Behavior: Behavior normal.         Thought Content: Thought content normal.         Judgment: Judgment normal.         Assessment:       1. Paroxysmal atrial fibrillation    2. Coronary artery disease involving native coronary artery of native heart without angina pectoris    3. Atherosclerosis of native artery of both lower extremities with intermittent claudication    4. Primary hypertension    5. Anemia due to stage 4  chronic kidney disease    6. CKD (chronic kidney disease) stage 4, GFR 15-29 ml/min         Plan:       In summary, Mr. Gross is a pleasant 62 year-old man with hypertension, chronic kidney disease stage IV, peripheral arterial disease with multiple interventions on lower extremity arteries, coronary artery disease (mid LAD/prox RCA PCI 3/2019 and prox LCA in 10/2019 following out of hospital cardiac arrest), cardiac arrest in setting of prox LCX occlusion treated with PCI with normal LV function, chronic anemia reportedly secondary to chronic renal disease although has had heme + stools and EGD 1/2022 noted non-bleeding erosive gastropathy and colonoscopy noted hemorrhoids and polyps, and paroxysmal atrial fibrillation. I had a long discussion with the patient about the pathophysiology and risks of atrial fibrillation and its basic pathophysiology, including its health implications and treatment options. Specifically, I addressed the need for CVA (stroke) prophylaxis with aspirin versus oral anticoagulation (warfarin vs DOACs, discussed bleeding risks, and need to come to the ER for any head trauma for CT scanning even if asymptomatic). His [id number] score is 2 and oral anticoagulation is indicated if there are no contraindications.  I also discussed the goal to reduce symptomatic arrhythmic episodes by pharmacologic and/or procedural methods and utilizing a rhythm versus a rate control strategy. If his anemia is due to CKD/chronic disease then anticoagulation is not a contraindication. He has no history of GI bleeding and hematology has treated him with EPO injections. Recommend resuming eliquis. Will discuss remaining on DAPT with Dr. Jenkins. If able to tolerate eliquis then will discuss rhythm control strategy. If felt to be too high risk for anticoagulation then may benefit from Watchman implant at some point.    Plan  Resume eliquis 5mg bid  Message sent to Dr. Jenkins regarding DAPT therapy with  eliquis  RTC in 6-8 weeks to review eliquis tolerance and discuss rhythm control.    Thank you for allowing me to participate in the care of this patient. Please do not hesitate to call me with any questions or concerns.    Dmitriy Chavarria MD, PhD  Cardiac Electrophysiology

## 2023-01-06 ENCOUNTER — OFFICE VISIT (OUTPATIENT)
Dept: CARDIOLOGY | Facility: CLINIC | Age: 63
End: 2023-01-06
Payer: MEDICAID

## 2023-01-06 VITALS
BODY MASS INDEX: 25.53 KG/M2 | DIASTOLIC BLOOD PRESSURE: 75 MMHG | HEART RATE: 74 BPM | SYSTOLIC BLOOD PRESSURE: 128 MMHG | WEIGHT: 177.94 LBS

## 2023-01-06 DIAGNOSIS — I10 PRIMARY HYPERTENSION: ICD-10-CM

## 2023-01-06 DIAGNOSIS — D63.1 ANEMIA DUE TO STAGE 4 CHRONIC KIDNEY DISEASE: ICD-10-CM

## 2023-01-06 DIAGNOSIS — I70.213 ATHEROSCLEROSIS OF NATIVE ARTERY OF BOTH LOWER EXTREMITIES WITH INTERMITTENT CLAUDICATION: ICD-10-CM

## 2023-01-06 DIAGNOSIS — N18.4 ANEMIA DUE TO STAGE 4 CHRONIC KIDNEY DISEASE: ICD-10-CM

## 2023-01-06 DIAGNOSIS — I25.10 CORONARY ARTERY DISEASE INVOLVING NATIVE CORONARY ARTERY OF NATIVE HEART WITHOUT ANGINA PECTORIS: ICD-10-CM

## 2023-01-06 DIAGNOSIS — I48.0 PAROXYSMAL ATRIAL FIBRILLATION: Primary | ICD-10-CM

## 2023-01-06 DIAGNOSIS — N18.4 CKD (CHRONIC KIDNEY DISEASE) STAGE 4, GFR 15-29 ML/MIN: ICD-10-CM

## 2023-01-06 PROCEDURE — 3078F PR MOST RECENT DIASTOLIC BLOOD PRESSURE < 80 MM HG: ICD-10-PCS | Mod: CPTII,,, | Performed by: INTERNAL MEDICINE

## 2023-01-06 PROCEDURE — 99214 OFFICE O/P EST MOD 30 MIN: CPT | Mod: S$PBB,,, | Performed by: INTERNAL MEDICINE

## 2023-01-06 PROCEDURE — 93010 ELECTROCARDIOGRAM REPORT: CPT | Mod: S$PBB,,, | Performed by: INTERNAL MEDICINE

## 2023-01-06 PROCEDURE — 1159F PR MEDICATION LIST DOCUMENTED IN MEDICAL RECORD: ICD-10-PCS | Mod: CPTII,,, | Performed by: INTERNAL MEDICINE

## 2023-01-06 PROCEDURE — 99213 OFFICE O/P EST LOW 20 MIN: CPT | Mod: PBBFAC,PO | Performed by: INTERNAL MEDICINE

## 2023-01-06 PROCEDURE — 1160F PR REVIEW ALL MEDS BY PRESCRIBER/CLIN PHARMACIST DOCUMENTED: ICD-10-PCS | Mod: CPTII,,, | Performed by: INTERNAL MEDICINE

## 2023-01-06 PROCEDURE — 99214 PR OFFICE/OUTPT VISIT, EST, LEVL IV, 30-39 MIN: ICD-10-PCS | Mod: S$PBB,,, | Performed by: INTERNAL MEDICINE

## 2023-01-06 PROCEDURE — 3074F PR MOST RECENT SYSTOLIC BLOOD PRESSURE < 130 MM HG: ICD-10-PCS | Mod: CPTII,,, | Performed by: INTERNAL MEDICINE

## 2023-01-06 PROCEDURE — 3078F DIAST BP <80 MM HG: CPT | Mod: CPTII,,, | Performed by: INTERNAL MEDICINE

## 2023-01-06 PROCEDURE — 3008F PR BODY MASS INDEX (BMI) DOCUMENTED: ICD-10-PCS | Mod: CPTII,,, | Performed by: INTERNAL MEDICINE

## 2023-01-06 PROCEDURE — 99999 PR PBB SHADOW E&M-EST. PATIENT-LVL III: CPT | Mod: PBBFAC,,, | Performed by: INTERNAL MEDICINE

## 2023-01-06 PROCEDURE — 1159F MED LIST DOCD IN RCRD: CPT | Mod: CPTII,,, | Performed by: INTERNAL MEDICINE

## 2023-01-06 PROCEDURE — 3008F BODY MASS INDEX DOCD: CPT | Mod: CPTII,,, | Performed by: INTERNAL MEDICINE

## 2023-01-06 PROCEDURE — 1160F RVW MEDS BY RX/DR IN RCRD: CPT | Mod: CPTII,,, | Performed by: INTERNAL MEDICINE

## 2023-01-06 PROCEDURE — 93005 ELECTROCARDIOGRAM TRACING: CPT | Mod: PBBFAC,PO | Performed by: INTERNAL MEDICINE

## 2023-01-06 PROCEDURE — 3074F SYST BP LT 130 MM HG: CPT | Mod: CPTII,,, | Performed by: INTERNAL MEDICINE

## 2023-01-06 PROCEDURE — 93010 RHYTHM STRIP: ICD-10-PCS | Mod: S$PBB,,, | Performed by: INTERNAL MEDICINE

## 2023-01-06 PROCEDURE — 99999 PR PBB SHADOW E&M-EST. PATIENT-LVL III: ICD-10-PCS | Mod: PBBFAC,,, | Performed by: INTERNAL MEDICINE

## 2023-01-09 ENCOUNTER — TELEPHONE (OUTPATIENT)
Dept: ELECTROPHYSIOLOGY | Facility: CLINIC | Age: 63
End: 2023-01-09
Payer: MEDICAID

## 2023-01-09 NOTE — TELEPHONE ENCOUNTER
----- Message from Dmitriy Chavarria MD sent at 1/9/2023  8:24 AM CST -----  Please let Mr. Gross know Dr. Jenkins state he can stop his plavix. He should continue aspirin with the eliquis.    ----- Message -----  From: Keo Jenkins MD  Sent: 1/6/2023   4:39 PM CST  To: Dmitriy Chavarria MD    Thanks    I agree     Aspirin alone is good    ZN  ----- Message -----  From: Dmitriy Chavarria MD  Sent: 1/6/2023   8:16 AM CST  To: Keo Jenkins MD, Gregory KYLE [facility] Keo    If Mr. Gross's anemia is from CKD then eliquis should not be a contraindication. I would like to resume it. Do you want him to stay on both aspirin/plavix as well?    Thanks    Dmitriy

## 2023-01-17 ENCOUNTER — PATIENT MESSAGE (OUTPATIENT)
Dept: INTERNAL MEDICINE | Facility: CLINIC | Age: 63
End: 2023-01-17
Payer: MEDICAID

## 2023-01-21 ENCOUNTER — PATIENT MESSAGE (OUTPATIENT)
Dept: INTERNAL MEDICINE | Facility: CLINIC | Age: 63
End: 2023-01-21
Payer: MEDICAID

## 2023-01-31 ENCOUNTER — PATIENT MESSAGE (OUTPATIENT)
Dept: CARDIOLOGY | Facility: CLINIC | Age: 63
End: 2023-01-31
Payer: MEDICAID

## 2023-02-05 ENCOUNTER — PATIENT MESSAGE (OUTPATIENT)
Dept: CARDIOLOGY | Facility: CLINIC | Age: 63
End: 2023-02-05
Payer: COMMERCIAL

## 2023-02-06 ENCOUNTER — PATIENT MESSAGE (OUTPATIENT)
Dept: CARDIOLOGY | Facility: CLINIC | Age: 63
End: 2023-02-06
Payer: COMMERCIAL

## 2023-02-07 ENCOUNTER — PATIENT MESSAGE (OUTPATIENT)
Dept: INTERNAL MEDICINE | Facility: CLINIC | Age: 63
End: 2023-02-07
Payer: COMMERCIAL

## 2023-02-08 ENCOUNTER — TELEPHONE (OUTPATIENT)
Dept: CARDIOLOGY | Facility: CLINIC | Age: 63
End: 2023-02-08
Payer: COMMERCIAL

## 2023-02-08 NOTE — TELEPHONE ENCOUNTER
If not taking nifedipine for now he can stay off.   His BP is well controlled  If he has been taking it we can do a refill     Thanks      ZN

## 2023-02-13 ENCOUNTER — PATIENT MESSAGE (OUTPATIENT)
Dept: CARDIOLOGY | Facility: CLINIC | Age: 63
End: 2023-02-13
Payer: COMMERCIAL

## 2023-02-18 ENCOUNTER — PATIENT MESSAGE (OUTPATIENT)
Dept: CARDIOLOGY | Facility: CLINIC | Age: 63
End: 2023-02-18
Payer: COMMERCIAL

## 2023-02-27 ENCOUNTER — PATIENT MESSAGE (OUTPATIENT)
Dept: INTERNAL MEDICINE | Facility: CLINIC | Age: 63
End: 2023-02-27
Payer: COMMERCIAL

## 2023-02-27 ENCOUNTER — LAB VISIT (OUTPATIENT)
Dept: LAB | Facility: HOSPITAL | Age: 63
End: 2023-02-27
Attending: INTERNAL MEDICINE
Payer: MEDICAID

## 2023-02-27 DIAGNOSIS — Z00.00 ANNUAL PHYSICAL EXAM: ICD-10-CM

## 2023-02-27 LAB
ALBUMIN SERPL BCP-MCNC: 2.4 G/DL (ref 3.5–5.2)
ALP SERPL-CCNC: 56 U/L (ref 55–135)
ALT SERPL W/O P-5'-P-CCNC: 15 U/L (ref 10–44)
ANION GAP SERPL CALC-SCNC: 8 MMOL/L (ref 8–16)
AST SERPL-CCNC: 20 U/L (ref 10–40)
BASOPHILS # BLD AUTO: 0.07 K/UL (ref 0–0.2)
BASOPHILS NFR BLD: 1 % (ref 0–1.9)
BILIRUB SERPL-MCNC: 0.3 MG/DL (ref 0.1–1)
BUN SERPL-MCNC: 24 MG/DL (ref 8–23)
CALCIUM SERPL-MCNC: 8.5 MG/DL (ref 8.7–10.5)
CHLORIDE SERPL-SCNC: 111 MMOL/L (ref 95–110)
CHOLEST SERPL-MCNC: 144 MG/DL (ref 120–199)
CHOLEST/HDLC SERPL: 2.6 {RATIO} (ref 2–5)
CO2 SERPL-SCNC: 27 MMOL/L (ref 23–29)
COMPLEXED PSA SERPL-MCNC: 3.1 NG/ML (ref 0–4)
CREAT SERPL-MCNC: 1.9 MG/DL (ref 0.5–1.4)
DIFFERENTIAL METHOD: ABNORMAL
EOSINOPHIL # BLD AUTO: 0.3 K/UL (ref 0–0.5)
EOSINOPHIL NFR BLD: 4.1 % (ref 0–8)
ERYTHROCYTE [DISTWIDTH] IN BLOOD BY AUTOMATED COUNT: 13.4 % (ref 11.5–14.5)
EST. GFR  (NO RACE VARIABLE): 39.6 ML/MIN/1.73 M^2
ESTIMATED AVG GLUCOSE: 103 MG/DL (ref 68–131)
GLUCOSE SERPL-MCNC: 106 MG/DL (ref 70–110)
HBA1C MFR BLD: 5.2 % (ref 4–5.6)
HCT VFR BLD AUTO: 28 % (ref 40–54)
HDLC SERPL-MCNC: 55 MG/DL (ref 40–75)
HDLC SERPL: 38.2 % (ref 20–50)
HGB BLD-MCNC: 9.3 G/DL (ref 14–18)
IMM GRANULOCYTES # BLD AUTO: 0.01 K/UL (ref 0–0.04)
IMM GRANULOCYTES NFR BLD AUTO: 0.1 % (ref 0–0.5)
LDLC SERPL CALC-MCNC: 75.8 MG/DL (ref 63–159)
LYMPHOCYTES # BLD AUTO: 1.7 K/UL (ref 1–4.8)
LYMPHOCYTES NFR BLD: 24.9 % (ref 18–48)
MCH RBC QN AUTO: 28.9 PG (ref 27–31)
MCHC RBC AUTO-ENTMCNC: 33.2 G/DL (ref 32–36)
MCV RBC AUTO: 87 FL (ref 82–98)
MONOCYTES # BLD AUTO: 0.7 K/UL (ref 0.3–1)
MONOCYTES NFR BLD: 9.4 % (ref 4–15)
NEUTROPHILS # BLD AUTO: 4.2 K/UL (ref 1.8–7.7)
NEUTROPHILS NFR BLD: 60.5 % (ref 38–73)
NONHDLC SERPL-MCNC: 89 MG/DL
NRBC BLD-RTO: 0 /100 WBC
PLATELET # BLD AUTO: 183 K/UL (ref 150–450)
PMV BLD AUTO: 11.3 FL (ref 9.2–12.9)
POTASSIUM SERPL-SCNC: 3.2 MMOL/L (ref 3.5–5.1)
PROT SERPL-MCNC: 5.3 G/DL (ref 6–8.4)
RBC # BLD AUTO: 3.22 M/UL (ref 4.6–6.2)
SODIUM SERPL-SCNC: 146 MMOL/L (ref 136–145)
T4 FREE SERPL-MCNC: 0.68 NG/DL (ref 0.71–1.51)
TRIGL SERPL-MCNC: 66 MG/DL (ref 30–150)
TSH SERPL DL<=0.005 MIU/L-ACNC: 5.95 UIU/ML (ref 0.4–4)
WBC # BLD AUTO: 6.9 K/UL (ref 3.9–12.7)

## 2023-02-27 PROCEDURE — 83036 HEMOGLOBIN GLYCOSYLATED A1C: CPT | Performed by: INTERNAL MEDICINE

## 2023-02-27 PROCEDURE — 84153 ASSAY OF PSA TOTAL: CPT | Performed by: INTERNAL MEDICINE

## 2023-02-27 PROCEDURE — 84443 ASSAY THYROID STIM HORMONE: CPT | Performed by: INTERNAL MEDICINE

## 2023-02-27 PROCEDURE — 80061 LIPID PANEL: CPT | Performed by: INTERNAL MEDICINE

## 2023-02-27 PROCEDURE — 36415 COLL VENOUS BLD VENIPUNCTURE: CPT | Mod: PO | Performed by: INTERNAL MEDICINE

## 2023-02-27 PROCEDURE — 80053 COMPREHEN METABOLIC PANEL: CPT | Performed by: INTERNAL MEDICINE

## 2023-02-27 PROCEDURE — 85025 COMPLETE CBC W/AUTO DIFF WBC: CPT | Performed by: INTERNAL MEDICINE

## 2023-02-27 PROCEDURE — 84439 ASSAY OF FREE THYROXINE: CPT | Performed by: INTERNAL MEDICINE

## 2023-03-02 ENCOUNTER — TELEPHONE (OUTPATIENT)
Dept: ELECTROPHYSIOLOGY | Facility: CLINIC | Age: 63
End: 2023-03-02
Payer: COMMERCIAL

## 2023-03-02 ENCOUNTER — OFFICE VISIT (OUTPATIENT)
Dept: INTERNAL MEDICINE | Facility: CLINIC | Age: 63
End: 2023-03-02
Payer: COMMERCIAL

## 2023-03-02 VITALS
HEART RATE: 80 BPM | SYSTOLIC BLOOD PRESSURE: 156 MMHG | OXYGEN SATURATION: 98 % | HEIGHT: 70 IN | BODY MASS INDEX: 26.82 KG/M2 | WEIGHT: 187.38 LBS | DIASTOLIC BLOOD PRESSURE: 80 MMHG

## 2023-03-02 DIAGNOSIS — I20.0 UNSTABLE ANGINA: ICD-10-CM

## 2023-03-02 DIAGNOSIS — D64.9 CHRONIC ANEMIA: ICD-10-CM

## 2023-03-02 DIAGNOSIS — N18.32 CHRONIC KIDNEY DISEASE, STAGE 3B: ICD-10-CM

## 2023-03-02 DIAGNOSIS — E87.6 HYPOKALEMIA: ICD-10-CM

## 2023-03-02 DIAGNOSIS — E78.2 MIXED HYPERLIPIDEMIA: Chronic | ICD-10-CM

## 2023-03-02 DIAGNOSIS — I10 PRIMARY HYPERTENSION: ICD-10-CM

## 2023-03-02 DIAGNOSIS — E03.9 HYPOTHYROIDISM, UNSPECIFIED TYPE: Primary | ICD-10-CM

## 2023-03-02 PROCEDURE — 3077F PR MOST RECENT SYSTOLIC BLOOD PRESSURE >= 140 MM HG: ICD-10-PCS | Mod: CPTII,,, | Performed by: INTERNAL MEDICINE

## 2023-03-02 PROCEDURE — 3079F PR MOST RECENT DIASTOLIC BLOOD PRESSURE 80-89 MM HG: ICD-10-PCS | Mod: CPTII,,, | Performed by: INTERNAL MEDICINE

## 2023-03-02 PROCEDURE — 99999 PR PBB SHADOW E&M-EST. PATIENT-LVL IV: CPT | Mod: PBBFAC,,, | Performed by: INTERNAL MEDICINE

## 2023-03-02 PROCEDURE — 1159F PR MEDICATION LIST DOCUMENTED IN MEDICAL RECORD: ICD-10-PCS | Mod: CPTII,,, | Performed by: INTERNAL MEDICINE

## 2023-03-02 PROCEDURE — 1160F RVW MEDS BY RX/DR IN RCRD: CPT | Mod: CPTII,,, | Performed by: INTERNAL MEDICINE

## 2023-03-02 PROCEDURE — 3044F PR MOST RECENT HEMOGLOBIN A1C LEVEL <7.0%: ICD-10-PCS | Mod: CPTII,,, | Performed by: INTERNAL MEDICINE

## 2023-03-02 PROCEDURE — 3079F DIAST BP 80-89 MM HG: CPT | Mod: CPTII,,, | Performed by: INTERNAL MEDICINE

## 2023-03-02 PROCEDURE — 99214 OFFICE O/P EST MOD 30 MIN: CPT | Mod: S$PBB,,, | Performed by: INTERNAL MEDICINE

## 2023-03-02 PROCEDURE — 1160F PR REVIEW ALL MEDS BY PRESCRIBER/CLIN PHARMACIST DOCUMENTED: ICD-10-PCS | Mod: CPTII,,, | Performed by: INTERNAL MEDICINE

## 2023-03-02 PROCEDURE — 99214 OFFICE O/P EST MOD 30 MIN: CPT | Mod: PBBFAC,PO | Performed by: INTERNAL MEDICINE

## 2023-03-02 PROCEDURE — 3008F BODY MASS INDEX DOCD: CPT | Mod: CPTII,,, | Performed by: INTERNAL MEDICINE

## 2023-03-02 PROCEDURE — 3077F SYST BP >= 140 MM HG: CPT | Mod: CPTII,,, | Performed by: INTERNAL MEDICINE

## 2023-03-02 PROCEDURE — 3044F HG A1C LEVEL LT 7.0%: CPT | Mod: CPTII,,, | Performed by: INTERNAL MEDICINE

## 2023-03-02 PROCEDURE — 3008F PR BODY MASS INDEX (BMI) DOCUMENTED: ICD-10-PCS | Mod: CPTII,,, | Performed by: INTERNAL MEDICINE

## 2023-03-02 PROCEDURE — 99214 PR OFFICE/OUTPT VISIT, EST, LEVL IV, 30-39 MIN: ICD-10-PCS | Mod: S$PBB,,, | Performed by: INTERNAL MEDICINE

## 2023-03-02 PROCEDURE — 1159F MED LIST DOCD IN RCRD: CPT | Mod: CPTII,,, | Performed by: INTERNAL MEDICINE

## 2023-03-02 PROCEDURE — 99999 PR PBB SHADOW E&M-EST. PATIENT-LVL IV: ICD-10-PCS | Mod: PBBFAC,,, | Performed by: INTERNAL MEDICINE

## 2023-03-02 RX ORDER — LEVOTHYROXINE SODIUM 25 UG/1
TABLET ORAL
Qty: 30 TABLET | Refills: 11 | Status: SHIPPED | OUTPATIENT
Start: 2023-03-02 | End: 2023-04-21

## 2023-03-02 NOTE — TELEPHONE ENCOUNTER
Notified by Dr. Encarnacion that patient stated someone told him to stop eliquis. Nothing in his chart is present regarding this. I spoke with Mr. Gross on the phone. His wife confirmed he was on eliquis and aspirin. Will see him in clinic in a few weeks and discuss cardioversion.

## 2023-03-02 NOTE — PROGRESS NOTES
Patient ID: Domingo Gross is a 62 y.o. male.    Chief Complaint: Annual Exam    HPI Taran is a 62 y.o. male with chronic anemia, CKD stage 3/4, CAD, PAD, HTN, HLD, COPD, suspected skin cancer and history of cardiac arrest  who presents for routine follow-up of medical conditions. Reviewed lab results from 2/27/23.   He reports having daily chest pain. This has been going on for a while. He tried contacting his cardiologist but cardiologist was out. He says he has been off eliquis for an anticipated cardiac procedure but we do not see one scheduled and he is unsure exactly would this procedure would be. He follows with cardiology, EP, Heme-onc, and nephrology.     Health Maintenance Topics with due status: Not Due       Topic Last Completion Date    Colorectal Cancer Screening 01/06/2022    High Dose Statin 01/06/2023    PROSTATE-SPECIFIC ANTIGEN 02/27/2023    Hemoglobin A1c (Diabetic Prevention Screening) 02/27/2023    Lipid Panel 02/27/2023        Review of Systems   Cardiovascular:  Positive for chest pain (chronic).   All other systems reviewed and are negative.      Objective:     Vitals:    03/02/23 1444   BP: (!) 156/80   Pulse:         Physical Exam  Vitals reviewed.   Constitutional:       General: He is not in acute distress.     Appearance: Normal appearance. He is well-developed. He is not ill-appearing, toxic-appearing or diaphoretic.   HENT:      Head: Normocephalic and atraumatic.      Right Ear: External ear normal.      Left Ear: External ear normal.      Nose: Nose normal.   Eyes:      Extraocular Movements: Extraocular movements intact.      Conjunctiva/sclera: Conjunctivae normal.   Cardiovascular:      Rate and Rhythm: Normal rate and regular rhythm.      Pulses: Normal pulses.      Heart sounds: Normal heart sounds. No murmur heard.    No friction rub. No gallop.   Pulmonary:      Effort: Pulmonary effort is normal. No respiratory distress.      Breath sounds: Normal breath sounds. No  stridor. No wheezing, rhonchi or rales.   Musculoskeletal:      Right lower leg: Edema (1+) present.      Left lower leg: Edema (1+) present.   Skin:     General: Skin is warm and dry.   Neurological:      General: No focal deficit present.      Mental Status: He is alert and oriented to person, place, and time. Mental status is at baseline.   Psychiatric:         Mood and Affect: Mood normal.         Behavior: Behavior normal.         Thought Content: Thought content normal.         Judgment: Judgment normal.       Assessment:       1. Hypothyroidism, unspecified type Active   2. Hypokalemia Chronic   3. Mixed hyperlipidemia Well controlled   4. Primary hypertension Chronic   5. Chronic anemia Chronic   6. Unstable angina Chronic   7. Chronic kidney disease, stage 3b Chronic         Plan:         Hypothyroidism, unspecified type  Comments:  Start low dose levothyroxine. Recheck TSH in 6-8 wks. Check thyroid US  Orders:  -     levothyroxine (SYNTHROID) 25 MCG tablet; Take one tablet PO every morning on an empty stomach and wait at least 1 hour before eating or taking other medications  Dispense: 30 tablet; Refill: 11  -     TSH; Future; Expected date: 03/02/2023  -     US Soft Tissue Head Neck Thyroid; Future; Expected date: 03/02/2023  -     TSH; Future; Expected date: 09/02/2023    Hypokalemia  Comments:  Pt has re-started his K supplement in response to this lab. Recheck level in 6-8 wks  Orders:  -     BASIC METABOLIC PANEL; Future; Expected date: 03/02/2023    Mixed hyperlipidemia  Comments:  Cont current medication  Orders:  -     Lipid Panel; Future; Expected date: 09/02/2023    Primary hypertension  Comments:  Cont current medications  Orders:  -     Comprehensive Metabolic Panel; Future; Expected date: 09/02/2023    Chronic anemia  Comments:  Cont to monitor. Following with heme-onc  Orders:  -     CBC Auto Differential; Future; Expected date: 09/02/2023    Unstable angina  Comments:  I have messaged his  cardiologist and electrophysiologist about this. Currently awaiting response     Chronic kidney disease, stage 3b  Comments:  Following with nephrology       TSH and BMP labs in 6-8 wks    RTC 6 months       Warning signs discussed, patient to call with any further issues or worsening of symptoms.       Parts of the above note were dictated using a voice dictation software. Please excuse any grammatical or typographical errors.

## 2023-03-06 ENCOUNTER — TELEPHONE (OUTPATIENT)
Dept: CARDIOLOGY | Facility: CLINIC | Age: 63
End: 2023-03-06
Payer: COMMERCIAL

## 2023-03-06 ENCOUNTER — PATIENT MESSAGE (OUTPATIENT)
Dept: CARDIOLOGY | Facility: CLINIC | Age: 63
End: 2023-03-06
Payer: COMMERCIAL

## 2023-03-06 DIAGNOSIS — R07.9 CHEST PAIN, UNSPECIFIED TYPE: Primary | ICD-10-CM

## 2023-03-06 DIAGNOSIS — E78.2 MIXED HYPERLIPIDEMIA: ICD-10-CM

## 2023-03-06 DIAGNOSIS — R07.89 OTHER CHEST PAIN: ICD-10-CM

## 2023-03-06 RX ORDER — NITROGLYCERIN 0.4 MG/1
0.4 TABLET SUBLINGUAL EVERY 5 MIN PRN
Qty: 20 TABLET | Refills: 12 | Status: SHIPPED | OUTPATIENT
Start: 2023-03-06 | End: 2023-08-02 | Stop reason: SDUPTHER

## 2023-03-06 RX ORDER — ROSUVASTATIN CALCIUM 40 MG/1
40 TABLET, COATED ORAL NIGHTLY
Qty: 90 TABLET | Refills: 3 | OUTPATIENT
Start: 2023-03-06

## 2023-03-06 NOTE — TELEPHONE ENCOUNTER
Please order a stress test + echo   Clinic visit thereafter  Sent SL NTG to the pharmacy      Thanks      ZN

## 2023-03-06 NOTE — TELEPHONE ENCOUNTER
3/6/2023 placed call to patient, scheduled stress test and virtual follow up with Dr. Jenkins, patient verbalized understanding.  SRM

## 2023-03-06 NOTE — TELEPHONE ENCOUNTER
----- Message from Keo Jenkins MD sent at 3/6/2023 11:08 AM CST -----  Please bring him asap  I will order a stress for him   Hopefully he can have prior to visit  He is having CP      Thanks      HOANG  ----- Message -----  From: Analy Encarnacion MD  Sent: 3/2/2023   3:40 PM CST  To: Keo Jenkins MD, mDitriy Chavarria MD    Hi Georgie Jenkins and Deon  This patient has been having episodes of chest pain daily. He says he was told to stop his eliquis in anticipation of a cardiac procedure, although I do not see one scheduled. Would either of you be able to fill me in on what this is and when it may occur? I am concerned about his chronic daily chest pain.     Thanks  Dr. Encarnacion

## 2023-03-06 NOTE — TELEPHONE ENCOUNTER
----- Message from Keo Jenkins MD sent at 3/6/2023 11:08 AM CST -----  Please bring him asap  I will order a stress for him   Hopefully he can have prior to visit  He is having CP      Thanks      HOANG  ----- Message -----  From: Analy Encarnacion MD  Sent: 3/2/2023   3:40 PM CST  To: Keo Jenkins MD, Dmitriy Chavarria MD    Hi Georgie Jenkins and Deon  This patient has been having episodes of chest pain daily. He says he was told to stop his eliquis in anticipation of a cardiac procedure, although I do not see one scheduled. Would either of you be able to fill me in on what this is and when it may occur? I am concerned about his chronic daily chest pain.     Thanks  Dr. Encarnacion

## 2023-03-08 ENCOUNTER — PATIENT MESSAGE (OUTPATIENT)
Dept: INTERNAL MEDICINE | Facility: CLINIC | Age: 63
End: 2023-03-08
Payer: COMMERCIAL

## 2023-03-09 ENCOUNTER — PATIENT MESSAGE (OUTPATIENT)
Dept: CARDIOLOGY | Facility: CLINIC | Age: 63
End: 2023-03-09
Payer: COMMERCIAL

## 2023-03-09 ENCOUNTER — HOSPITAL ENCOUNTER (OUTPATIENT)
Dept: RADIOLOGY | Facility: HOSPITAL | Age: 63
Discharge: HOME | End: 2023-03-09
Attending: INTERNAL MEDICINE
Payer: MEDICAID

## 2023-03-09 ENCOUNTER — HOSPITAL ENCOUNTER (OUTPATIENT)
Dept: CARDIOLOGY | Facility: HOSPITAL | Age: 63
Discharge: HOME | End: 2023-03-09
Attending: INTERNAL MEDICINE
Payer: COMMERCIAL

## 2023-03-09 VITALS — WEIGHT: 187 LBS | HEIGHT: 70 IN | BODY MASS INDEX: 26.77 KG/M2

## 2023-03-09 DIAGNOSIS — E03.9 HYPOTHYROIDISM, UNSPECIFIED TYPE: ICD-10-CM

## 2023-03-09 DIAGNOSIS — R07.9 CHEST PAIN, UNSPECIFIED TYPE: ICD-10-CM

## 2023-03-09 LAB
AORTIC ROOT ANNULUS: 3.3 CM
AORTIC VALVE CUSP SEPERATION: 2.05 CM
AV INDEX (PROSTH): 0.6
AV MEAN GRADIENT: 3 MMHG
AV PEAK GRADIENT: 5 MMHG
AV VALVE AREA: 2.04 CM2
AV VELOCITY RATIO: 0.72
BSA FOR ECHO PROCEDURE: 2.05 M2
CV ECHO LV RWT: 0.57 CM
DOP CALC AO PEAK VEL: 1.14 M/S
DOP CALC AO VTI: 26.5 CM
DOP CALC LVOT AREA: 3.4 CM2
DOP CALC LVOT DIAMETER: 2.09 CM
DOP CALC LVOT PEAK VEL: 0.82 M/S
DOP CALC LVOT STROKE VOLUME: 54.18 CM3
DOP CALC MV VTI: 32.3 CM
DOP CALCLVOT PEAK VEL VTI: 15.8 CM
ECHO LV POSTERIOR WALL: 1.42 CM (ref 0.6–1.1)
EJECTION FRACTION: 55 %
FRACTIONAL SHORTENING: 52 % (ref 28–44)
INTERVENTRICULAR SEPTUM: 1.33 CM (ref 0.6–1.1)
IVRT: 87.54 MSEC
LA MAJOR: 6.7 CM
LA MINOR: 5.8 CM
LA WIDTH: 4 CM
LEFT ATRIUM SIZE: 4.78 CM
LEFT ATRIUM VOLUME INDEX MOD: 29.3 ML/M2
LEFT ATRIUM VOLUME INDEX: 49.8 ML/M2
LEFT ATRIUM VOLUME MOD: 59.55 CM3
LEFT ATRIUM VOLUME: 101.05 CM3
LEFT INTERNAL DIMENSION IN SYSTOLE: 2.4 CM (ref 2.1–4)
LEFT VENTRICLE DIASTOLIC VOLUME INDEX: 57.26 ML/M2
LEFT VENTRICLE DIASTOLIC VOLUME: 116.24 ML
LEFT VENTRICLE MASS INDEX: 138 G/M2
LEFT VENTRICLE SYSTOLIC VOLUME INDEX: 39.1 ML/M2
LEFT VENTRICLE SYSTOLIC VOLUME: 79.44 ML
LEFT VENTRICULAR INTERNAL DIMENSION IN DIASTOLE: 4.96 CM (ref 3.5–6)
LEFT VENTRICULAR MASS: 280.39 G
LVOT MG: 1.12 MMHG
LVOT MV: 0.47 CM/S
MV MEAN GRADIENT: 2 MMHG
MV PEAK GRADIENT: 5 MMHG
MV VALVE AREA BY CONTINUITY EQUATION: 1.68 CM2
PISA MRMAX VEL: 5.79 M/S
PISA TR MAX VEL: 2.95 M/S
PV MV: 0.58 M/S
PV PEAK VELOCITY: 0.92 CM/S
RA MAJOR: 5.4 CM
RA PRESSURE: 3 MMHG
RA WIDTH: 4 CM
RIGHT VENTRICULAR END-DIASTOLIC DIMENSION: 3 CM
RIGHT VENTRICULAR LENGTH IN DIASTOLE (APICAL 4-CHAMBER VIEW): 5.7 CM
STJ: 3.5 CM
TDI LATERAL: 0.09 M/S
TDI SEPTAL: 0.07 M/S
TDI: 0.08 M/S
TR MAX PG: 35 MMHG
TRICUSPID ANNULAR PLANE SYSTOLIC EXCURSION: 2.1 CM
TV REST PULMONARY ARTERY PRESSURE: 38 MMHG

## 2023-03-09 PROCEDURE — 93306 TTE W/DOPPLER COMPLETE: CPT | Mod: 26,,, | Performed by: INTERNAL MEDICINE

## 2023-03-09 PROCEDURE — 76536 US EXAM OF HEAD AND NECK: CPT | Mod: 26,,, | Performed by: RADIOLOGY

## 2023-03-09 PROCEDURE — 76536 US EXAM OF HEAD AND NECK: CPT | Mod: TC

## 2023-03-09 PROCEDURE — 76536 US SOFT TISSUE HEAD NECK THYROID: ICD-10-PCS | Mod: 26,,, | Performed by: RADIOLOGY

## 2023-03-09 PROCEDURE — 93306 TTE W/DOPPLER COMPLETE: CPT

## 2023-03-09 PROCEDURE — 93306 ECHO (CUPID ONLY): ICD-10-PCS | Mod: 26,,, | Performed by: INTERNAL MEDICINE

## 2023-03-14 ENCOUNTER — OFFICE VISIT (OUTPATIENT)
Dept: HEMATOLOGY/ONCOLOGY | Facility: CLINIC | Age: 63
End: 2023-03-14
Payer: COMMERCIAL

## 2023-03-14 ENCOUNTER — HOSPITAL ENCOUNTER (OUTPATIENT)
Dept: CARDIOLOGY | Facility: HOSPITAL | Age: 63
Discharge: HOME | End: 2023-03-14
Attending: INTERNAL MEDICINE
Payer: COMMERCIAL

## 2023-03-14 ENCOUNTER — HOSPITAL ENCOUNTER (OUTPATIENT)
Dept: RADIOLOGY | Facility: HOSPITAL | Age: 63
Discharge: HOME | End: 2023-03-14
Attending: INTERNAL MEDICINE
Payer: COMMERCIAL

## 2023-03-14 VITALS
HEIGHT: 70 IN | TEMPERATURE: 97 F | RESPIRATION RATE: 18 BRPM | HEART RATE: 71 BPM | WEIGHT: 179 LBS | DIASTOLIC BLOOD PRESSURE: 94 MMHG | OXYGEN SATURATION: 99 % | BODY MASS INDEX: 25.62 KG/M2 | SYSTOLIC BLOOD PRESSURE: 154 MMHG

## 2023-03-14 DIAGNOSIS — R07.9 CHEST PAIN, UNSPECIFIED TYPE: ICD-10-CM

## 2023-03-14 DIAGNOSIS — I48.0 PAROXYSMAL ATRIAL FIBRILLATION: ICD-10-CM

## 2023-03-14 DIAGNOSIS — I25.10 CORONARY ARTERY DISEASE INVOLVING NATIVE CORONARY ARTERY OF NATIVE HEART WITHOUT ANGINA PECTORIS: ICD-10-CM

## 2023-03-14 DIAGNOSIS — N18.4 ANEMIA DUE TO STAGE 4 CHRONIC KIDNEY DISEASE: Primary | ICD-10-CM

## 2023-03-14 DIAGNOSIS — D63.1 ANEMIA DUE TO STAGE 4 CHRONIC KIDNEY DISEASE: Primary | ICD-10-CM

## 2023-03-14 DIAGNOSIS — D50.9 IRON DEFICIENCY ANEMIA, UNSPECIFIED IRON DEFICIENCY ANEMIA TYPE: ICD-10-CM

## 2023-03-14 DIAGNOSIS — R07.89 OTHER CHEST PAIN: ICD-10-CM

## 2023-03-14 DIAGNOSIS — N18.4 CKD (CHRONIC KIDNEY DISEASE) STAGE 4, GFR 15-29 ML/MIN: ICD-10-CM

## 2023-03-14 PROCEDURE — 99999 PR PBB SHADOW E&M-EST. PATIENT-LVL IV: ICD-10-PCS | Mod: PBBFAC,,, | Performed by: INTERNAL MEDICINE

## 2023-03-14 PROCEDURE — 99999 PR PBB SHADOW E&M-EST. PATIENT-LVL IV: CPT | Mod: PBBFAC,,, | Performed by: INTERNAL MEDICINE

## 2023-03-14 PROCEDURE — 1159F PR MEDICATION LIST DOCUMENTED IN MEDICAL RECORD: ICD-10-PCS | Mod: CPTII,,, | Performed by: INTERNAL MEDICINE

## 2023-03-14 PROCEDURE — 3008F PR BODY MASS INDEX (BMI) DOCUMENTED: ICD-10-PCS | Mod: CPTII,,, | Performed by: INTERNAL MEDICINE

## 2023-03-14 PROCEDURE — 3080F PR MOST RECENT DIASTOLIC BLOOD PRESSURE >= 90 MM HG: ICD-10-PCS | Mod: CPTII,,, | Performed by: INTERNAL MEDICINE

## 2023-03-14 PROCEDURE — 3044F PR MOST RECENT HEMOGLOBIN A1C LEVEL <7.0%: ICD-10-PCS | Mod: CPTII,,, | Performed by: INTERNAL MEDICINE

## 2023-03-14 PROCEDURE — 3077F PR MOST RECENT SYSTOLIC BLOOD PRESSURE >= 140 MM HG: ICD-10-PCS | Mod: CPTII,,, | Performed by: INTERNAL MEDICINE

## 2023-03-14 PROCEDURE — 3044F HG A1C LEVEL LT 7.0%: CPT | Mod: CPTII,,, | Performed by: INTERNAL MEDICINE

## 2023-03-14 PROCEDURE — 99212 OFFICE O/P EST SF 10 MIN: CPT | Mod: S$PBB,,, | Performed by: INTERNAL MEDICINE

## 2023-03-14 PROCEDURE — 1159F MED LIST DOCD IN RCRD: CPT | Mod: CPTII,,, | Performed by: INTERNAL MEDICINE

## 2023-03-14 PROCEDURE — 3077F SYST BP >= 140 MM HG: CPT | Mod: CPTII,,, | Performed by: INTERNAL MEDICINE

## 2023-03-14 PROCEDURE — 99212 PR OFFICE/OUTPT VISIT, EST, LEVL II, 10-19 MIN: ICD-10-PCS | Mod: S$PBB,,, | Performed by: INTERNAL MEDICINE

## 2023-03-14 PROCEDURE — 3080F DIAST BP >= 90 MM HG: CPT | Mod: CPTII,,, | Performed by: INTERNAL MEDICINE

## 2023-03-14 PROCEDURE — 3008F BODY MASS INDEX DOCD: CPT | Mod: CPTII,,, | Performed by: INTERNAL MEDICINE

## 2023-03-14 PROCEDURE — 99214 OFFICE O/P EST MOD 30 MIN: CPT | Mod: PBBFAC,PO | Performed by: INTERNAL MEDICINE

## 2023-03-14 NOTE — PROGRESS NOTES
"PATIENT: Domingo Gross  MRN: [id number]  DATE: 3/14/2023    Subjective:     Chief complaint:No chief complaint on file.        Interval History: Mr. Gross returns for follow up on anemia. He did not get labs done that were ordered last visit. He has not had any bleeding. He reports occasional chest pain--he was supposed to have nuclear stress test this morning but he drank a coke so it had to be rescheduled for next week.             ECOG Performance Status:   ECOG SCORE    1 - Restricted in strenuous activity-ambulatory and able to carry out work of a light nature         Objective:      Vitals:   Vitals:    03/14/23 1106   BP: (!) 154/94   BP Location: Left arm   Patient Position: Sitting   BP Method: Large (Automatic)   Pulse: 71   Resp: 18   Temp: 97.1 °F (36.2 °C)   TempSrc: Oral   SpO2: 99%   Weight: 81.2 kg (179 lb 0.2 oz)   Height: 5' 10" (1.778 m)     BMI: Body mass index is 25.69 kg/m².      Physical Exam:   Physical Exam  Vitals and nursing note reviewed.   Constitutional:       General: He is not in acute distress.     Appearance: Normal appearance. He is normal weight. He is not toxic-appearing.   HENT:      Head: Normocephalic and atraumatic.   Eyes:      General: No scleral icterus.     Conjunctiva/sclera: Conjunctivae normal.   Cardiovascular:      Rate and Rhythm: Normal rate and regular rhythm.      Heart sounds: Normal heart sounds.   Pulmonary:      Effort: Pulmonary effort is normal.      Breath sounds: Normal breath sounds. No wheezing, rhonchi or rales.   Abdominal:      General: Abdomen is flat. Bowel sounds are normal.      Palpations: Abdomen is soft.      Tenderness: There is no abdominal tenderness.   Musculoskeletal:         General: No tenderness or deformity.      Cervical back: Neck supple.   Lymphadenopathy:      Cervical: No cervical adenopathy.   Skin:     General: Skin is warm and dry.      Coloration: Skin is not jaundiced.      Findings: No bruising or erythema. "   Neurological:      General: No focal deficit present.      Mental Status: He is alert and oriented to person, place, and time. Mental status is at baseline.   Psychiatric:         Mood and Affect: Mood normal.         Behavior: Behavior normal.         Thought Content: Thought content normal.         Laboratory Data:  WBC   Date Value Ref Range Status   02/27/2023 6.90 3.90 - 12.70 K/uL Final     Hemoglobin   Date Value Ref Range Status   02/27/2023 9.3 (L) 14.0 - 18.0 g/dL Final     Hematocrit   Date Value Ref Range Status   02/27/2023 28.0 (L) 40.0 - 54.0 % Final     Platelets   Date Value Ref Range Status   02/27/2023 183 150 - 450 K/uL Final     Gran # (ANC)   Date Value Ref Range Status   02/27/2023 4.2 1.8 - 7.7 K/uL Final     Gran %   Date Value Ref Range Status   02/27/2023 60.5 38.0 - 73.0 % Final       Chemistry        Component Value Date/Time     (H) 02/27/2023 0704    K 3.2 (L) 02/27/2023 0704     (H) 02/27/2023 0704    CO2 27 02/27/2023 0704    BUN 24 (H) 02/27/2023 0704    CREATININE 1.9 (H) 02/27/2023 0704     02/27/2023 0704        Component Value Date/Time    CALCIUM 8.5 (L) 02/27/2023 0704    ALKPHOS 56 02/27/2023 0704    AST 20 02/27/2023 0704    ALT 15 02/27/2023 0704    BILITOT 0.3 02/27/2023 0704    ESTGFRAFRICA 28 (A) 07/25/2022 0842    EGFRNONAA 24 (A) 07/25/2022 0842              Assessment/Plan:     1. Anemia due to stage 4 chronic kidney disease    2. CKD (chronic kidney disease) stage 4, GFR 15-29 ml/min    3. Coronary artery disease involving native coronary artery of native heart without angina pectoris    4. Iron deficiency anemia, unspecified iron deficiency anemia type    5. Paroxysmal atrial fibrillation      Anemia is stable; iron saturation was low previously, repeat iron labs and if low likely consider another course of IV iron.    Med and Orders:       Follow Up:  Follow up in about 6 months (around 9/14/2023).    Patient instructions:   There are no  Patient Instructions on file for this visit.    Above care plan was discussed with patient and all questions were addressed to their expressed satisfaction.       Robby Reddy MD, FACP  Hematology & Medical Oncology  [facility]

## 2023-03-15 ENCOUNTER — OFFICE VISIT (OUTPATIENT)
Dept: CARDIOLOGY | Facility: CLINIC | Age: 63
End: 2023-03-15
Payer: MEDICAID

## 2023-03-15 DIAGNOSIS — I25.111 CORONARY ARTERY DISEASE INVOLVING NATIVE CORONARY ARTERY OF NATIVE HEART WITH ANGINA PECTORIS WITH DOCUMENTED SPASM: Primary | ICD-10-CM

## 2023-03-15 DIAGNOSIS — I10 PRIMARY HYPERTENSION: ICD-10-CM

## 2023-03-15 DIAGNOSIS — I65.23 BILATERAL CAROTID ARTERY STENOSIS: ICD-10-CM

## 2023-03-15 DIAGNOSIS — I70.213 ATHEROSCLEROSIS OF NATIVE ARTERY OF BOTH LOWER EXTREMITIES WITH INTERMITTENT CLAUDICATION: ICD-10-CM

## 2023-03-15 DIAGNOSIS — E78.2 MIXED HYPERLIPIDEMIA: Chronic | ICD-10-CM

## 2023-03-15 DIAGNOSIS — N18.4 CKD (CHRONIC KIDNEY DISEASE) STAGE 4, GFR 15-29 ML/MIN: ICD-10-CM

## 2023-03-15 DIAGNOSIS — I10 ESSENTIAL HYPERTENSION: ICD-10-CM

## 2023-03-15 DIAGNOSIS — I87.2 VENOUS INSUFFICIENCY OF BOTH LOWER EXTREMITIES: ICD-10-CM

## 2023-03-15 PROCEDURE — 3044F PR MOST RECENT HEMOGLOBIN A1C LEVEL <7.0%: ICD-10-PCS | Mod: CPTII,95,, | Performed by: INTERNAL MEDICINE

## 2023-03-15 PROCEDURE — 99215 OFFICE O/P EST HI 40 MIN: CPT | Mod: 95,,, | Performed by: INTERNAL MEDICINE

## 2023-03-15 PROCEDURE — 3044F HG A1C LEVEL LT 7.0%: CPT | Mod: CPTII,95,, | Performed by: INTERNAL MEDICINE

## 2023-03-15 PROCEDURE — 99215 PR OFFICE/OUTPT VISIT, EST, LEVL V, 40-54 MIN: ICD-10-PCS | Mod: 95,,, | Performed by: INTERNAL MEDICINE

## 2023-03-15 RX ORDER — EPLERENONE 50 MG/1
50 TABLET, FILM COATED ORAL DAILY
Qty: 90 TABLET | Refills: 3 | Status: SHIPPED | OUTPATIENT
Start: 2023-03-15 | End: 2023-06-30

## 2023-03-15 RX ORDER — ISOSORBIDE MONONITRATE 30 MG/1
30 TABLET, EXTENDED RELEASE ORAL DAILY
Qty: 90 TABLET | Refills: 3 | Status: ON HOLD | OUTPATIENT
Start: 2023-03-15 | End: 2023-04-10 | Stop reason: HOSPADM

## 2023-03-15 NOTE — PROGRESS NOTES
Attempted to contact some with the digital hypertension program in regards to Mr. Gross's BP readings.  He is a part of this program & states to me that he has not heard from anyone in the program & is having elevated readings.  He has attended 2 session of Cardiac rehab & we have noted elevations in his BP.  He states that he is checking his BP on a daily basis at numerous times of the day.  Left message for someone to contact either myself or Mr. Gross.   Krystal Easton RN  Cardiac Rehab Nurse    Contact a different telephone # for [facility] & spoke with Eugene.  He states that they are just receiving his readings today.  Eugene states that someone is scheduled to contact him today.  Will continue to monitor patient's BP during rehab.  Krystal Easton RN  Cardiac Rehab Nurse  
Resident

## 2023-03-15 NOTE — PROGRESS NOTES
Subjective:   Patient ID:  Domingo Gross is a 62 y.o. male who presents for follow up of Chest Pain, Coronary Artery Disease, Atrial Fibrillation, Hyperlipidemia, Hypertension, and Peripheral Arterial Disease        HPI:           The patient location is: home    Visit type: Virtual visit with synchronous audio and video  Total time spent with patient: 35 minutes of chart review, discussion, medications update, and charting.   Each patient to whom we provide medical services by telemedicine is:  (1) informed of the relationship between the physician and patient and the respective role of any other health care provider with respect to management of the patient; and (2) notified that he or she may decline to receive medical services by telemedicine and may withdraw from such care at any time.              Domingo Gross 62 y.o. male is here follow up and complaining of chest discomfort.  He is taken nitroglycerin at least once.  Worse pain 5/10.  He is compliant with his medications.  Blood pressure today 150/94.  He is tolerating Eliquis well.        He has a history CAD, HTN, HLP, PAF in NSR, edema, CKD, anemia related to CKD, and PAD with winsome IIb claudication. He is s/p bilateral SFA, GRUPO, EIA, and R CFA revascularization. His peripheral vascular intervention in 12/2021 was cancelled due to severe anemia with a H/H that required a transfusion with subsequent ongoing intervention by heme/onc. He has a GI work up for anemia was normal. His H/H has improved with heme/onc care.        He was admitted 10/4/2019 for cardiac arrest. He was fully rescued. Initial rhythm was afib with rvr. He was not taken immediately to the cath lab because of ARF + hypokalemia. He had a diagnostic angiogram which revealed patent LM, LAD, RCA stent, and new ostial LCX lesion 99% with R to L collaterals. He had PCI with REZA after his renal function returned to baseline.         8/19/2021 with CP HTN urgency with . Added  nifedipine and BP has been stable. No CP            ELISABETH with stress 5/2017:   R 1.01 to 0.44   L 0.92 to 0.45    After 6 minutes ambulation      CTA 5/2017:       Patent distal aorta and bilateral GRUPO/EIA stents   L EIA with de shawna 90% stenosis   L SFA 80% with 3 vessel run off     R EIA/CFA with moderate disease   R SFA 80% stenosis with 3 vessel run off        Peripheral angiogram with intervention 6/2017         Patent bilateral GRUPO/EIA stents.    De shawna 80% left EIA stenosis treated with 9.0 x 80 mm Lifestar  stent post dilated with 8.0 mm balloon.    Bilateral 70-80% SFA stenosis with 3 vessel run off.        3/2018      L SFA intervention for claudication   75% L SFA with 3 vessel run off   7 mmHg resting and 33 mmHg hyperemic mean gradient         L CFA antegrade access   PTA with 6.0 x 150 mm   PTA with 6.0 x 150 mm Lutonix   3 vessel run off           4/13/2018       S/p R SFA PTA for winsome IIb claudication   R CFA antegrade access         R CFA with 50% stenosis-heavily calcified lesion               Baseline /90         R SFA with 70% stenosis with a significant resting and hyperemic mean gradient     3 vessel run off             PTA of R SFA with 6.0 x 150 + 6.0 x 60 mm Lutonix DCB        5/2/2018   Patent arteries post revascularization        2/2019     R 0.84 to 0.27   L 0.90 to 0.66   After ambulation   Severe R calf claudication          Nuclear stress test 2/2019     + ECG with 2 mm ST depression   No wall motion abnormalities   Test stopped at 6 minutes, 7 METs, 86% predicted heart rate   Stopped because of R leg claudication + ECG changes          S/p PCI RCA and LAD with REZA 3/2019       RCA 3.5 x 22 mm Resolute REZA   LAD 2.5 x 30 and 2.5 x 25 mm Resolute REZA      Peripheral aortogram 5/10/2019     R CFA 95% stenosis   3 vessel run off        Seen by Dr. Giron for R CFA endarterectomy but preferable should be off plavix for at least 5 days. He has a risk for stent thrombosis with  premature interruption of DAPT. He later had R CFA atherectomy + PTA with DCB.           10/11/2019 for cardiac arrest        S/p staged LCX PCI                    L CFA access              IVUS guided PCI              3.0 x 15 mm Resolute REZA post dilated with 3.5 NC balloon         PET stress 2/2020     No ischemia      Echo 2/2021      EF 55%   Normal diastolic fn   Normal RV fn   Mild MR/TR/MO         Arterial US 7/2021     Bilateral SFA disease > 70      Venous US 7/2021     No DVT   No superficial reflux   R POP vein reflux > 500 msec    Echo 8/2021      Normal EF   Normal RV fn   Mild-mod MR   Mod MO   PASP 26 mmHg        ELISABETH 11/2021      Resting ELISABETH right 0.84 and left 0.83   Exercise ELISABETH right 0.39 and left 0.32 after 3.5 minutes ambulation   TBI right 0.58 and left 0.48          US 11/2021      Bilateral mid SFA high grade disease      S/p PTA of L CFA + SFA with atherectomy + DCB (5/2022)       S/p PTA of R CFA with atherectomy + DCB (8/2022)      Echo 11/2022     Nl EF   Normal LA   Mild MR/TR/MO         Patient Active Problem List    Diagnosis Date Noted    Cardiac arrest 10/04/2019     Priority: High    Atherosclerosis of native artery of both lower extremities with intermittent claudication 03/02/2018     Priority: High    Hypothyroidism 03/02/2023    Unstable angina 03/02/2023     I have messaged his cardiologist and electrophysiologist about this. Currently awaiting response       Paroxysmal atrial fibrillation 11/12/2022    Anemia due to stage 4 chronic kidney disease 12/15/2021    CKD (chronic kidney disease) stage 4, GFR 15-29 ml/min 08/20/2021    COPD (chronic obstructive pulmonary disease) 08/20/2021    Nuclear sclerosis, bilateral 06/08/2020    Nephrotic range proteinuria 04/06/2020    Hypokalemia 10/04/2019    Bilateral carotid artery stenosis     Coronary artery disease involving native coronary artery of native heart without angina pectoris 03/29/2019         3/29/2019    S/p LHC via R  radial           LM, LAD, and LCX are patent with luminal irregularities  RCA mid 95% calcified lesion  Normal EF with LVEDP 8 mmHg           PCI of mid LAD with 2.5 x 30 and 2.5 x 15 mm Resolute REZA  PCI of proximal RCA to treat guide dissection with 3.5 x 22 Resolute REZA        10/11/2019 for cardiac arrest        S/p staged LCX PCI                    L CFA access              IVUS guided PCI              3.0 x 15 mm Resolute REZA post dilated with 3.5 NC balloon           Venous insufficiency of both lower extremities 2018    Hyperlipidemia 2015    DJD (degenerative joint disease), lumbar 2015    DDD (degenerative disc disease) 2015    Claudication in peripheral vascular disease 2014    HTN (hypertension) 2013           Right Arm BP - Sittin/94  Left Arm BP - Sittin/94          LAST HbA1c  Lab Results   Component Value Date    HGBA1C 5.2 2023       Lipid panel  Lab Results   Component Value Date    CHOL 144 2023    CHOL 116 (L) 2022    CHOL 105 (L) 2021     Lab Results   Component Value Date    HDL 55 2023    HDL 50 2022    HDL 44 2021     Lab Results   Component Value Date    LDLCALC 75.8 2023    LDLCALC 41.6 (L) 2022    LDLCALC 51.0 (L) 2021     Lab Results   Component Value Date    TRIG 66 2023    TRIG 122 2022    TRIG 50 2021     Lab Results   Component Value Date    CHOLHDL 38.2 2023    CHOLHDL 43.1 2022    CHOLHDL 41.9 2021            Review of Systems   Constitutional: Negative for diaphoresis, night sweats, weight gain and weight loss.   HENT:  Negative for congestion.    Eyes:  Negative for blurred vision, discharge and double vision.   Cardiovascular:  Negative for chest pain, claudication, cyanosis, dyspnea on exertion, irregular heartbeat, leg swelling, near-syncope, orthopnea, palpitations, paroxysmal nocturnal dyspnea and syncope.   Respiratory:  Negative for  cough, shortness of breath and wheezing.    Endocrine: Negative for cold intolerance, heat intolerance and polyphagia.   Hematologic/Lymphatic: Negative for adenopathy and bleeding problem. Does not bruise/bleed easily.   Skin:  Negative for dry skin and nail changes.   Musculoskeletal:  Negative for arthritis, back pain, falls, joint pain, myalgias and neck pain.   Gastrointestinal:  Negative for bloating, abdominal pain, change in bowel habit and constipation.   Genitourinary:  Negative for bladder incontinence, dysuria, flank pain, genital sores and missed menses.   Neurological:  Negative for aphonia, brief paralysis, difficulty with concentration, dizziness and weakness.   Psychiatric/Behavioral:  Negative for altered mental status and memory loss. The patient does not have insomnia.    Allergic/Immunologic: Negative for environmental allergies.     Objective:   Physical Exam  Constitutional:       Appearance: He is well-developed.      Interventions: He is not intubated.  HENT:      Head: Normocephalic and atraumatic.      Right Ear: External ear normal.      Left Ear: External ear normal.   Eyes:      General: No scleral icterus.        Right eye: No discharge.         Left eye: No discharge.      Conjunctiva/sclera: Conjunctivae normal.      Pupils: Pupils are equal, round, and reactive to light.   Neck:      Thyroid: No thyromegaly.      Vascular: Normal carotid pulses. No carotid bruit, hepatojugular reflux or JVD.      Trachea: No tracheal deviation.   Cardiovascular:      Rate and Rhythm: Normal rate and regular rhythm. No extrasystoles are present.     Chest Wall: PMI is not displaced.      Pulses:           Carotid pulses are 2+ on the right side and 2+ on the left side.       Radial pulses are 2+ on the right side and 2+ on the left side.        Femoral pulses are 2+ on the right side and 2+ on the left side.       Popliteal pulses are 2+ on the right side and 2+ on the left side.        Dorsalis  pedis pulses are 1+ on the right side and 2+ on the left side.        Posterior tibial pulses are 1+ on the right side and 2+ on the left side.      Heart sounds: S1 normal and S2 normal. Heart sounds not distant. No midsystolic click. No murmur heard.    No friction rub. No gallop. No S3 sounds.      Comments:       Biphasic R DP and weak but biphasic R PT doppler signals       Biphasic L DP and PT doppler signals          Pulmonary:      Effort: Pulmonary effort is normal. No tachypnea, bradypnea, accessory muscle usage or respiratory distress. He is not intubated.      Breath sounds: Normal breath sounds. No stridor. No decreased breath sounds, wheezing or rales.   Chest:      Chest wall: No tenderness.   Abdominal:      General: There is no distension or abdominal bruit.      Palpations: There is no mass or pulsatile mass.      Tenderness: There is no abdominal tenderness. There is no guarding or rebound.   Musculoskeletal:         General: No tenderness. Normal range of motion.      Cervical back: Normal range of motion and neck supple.   Lymphadenopathy:      Cervical: No cervical adenopathy.   Skin:     General: Skin is warm.      Coloration: Skin is not pale.      Findings: No erythema or rash.   Neurological:      Mental Status: He is alert and oriented to person, place, and time.      Cranial Nerves: No cranial nerve deficit.      Coordination: Coordination normal.      Deep Tendon Reflexes: Reflexes are normal and symmetric.   Psychiatric:         Behavior: Behavior normal.         Thought Content: Thought content normal.         Judgment: Judgment normal.         Assessment:     1. Coronary artery disease involving native coronary artery of native heart without angina pectoris    2. Essential hypertension    3. Mixed hyperlipidemia    4. Atherosclerosis of native artery of both lower extremities with intermittent claudication    5. Primary hypertension    6. Venous insufficiency of both lower extremities     7. Bilateral carotid artery stenosis    8. CKD (chronic kidney disease) stage 4, GFR 15-29 ml/min        Plan:       He has a stress test scheduled March 20, 2023.  He is a virtual follow-up March 22nd to discuss the result.  If abnormal he will proceed with coronary angiography plus minus intervention.  Imdur 30 mg was started today.  He was instructed to go to the emergency room he has any recurrent discomfort without resolution.  He expressed verbal understanding of the plan.  All his questions were answered to his satisfaction.    Medical therapy for CAD  DAPT with aspirin + plavix  Intense statin therapy  Arb  Pletal       Target LDL < 70  Low salt diet  Weight loss  Target BP < 130/80 mmHg          Continue with current medical plan and lifestyle changes.  Return sooner for concerns or questions. If symptoms persist go to the ED  I have reviewed all pertinent data on this patient           Follow up as scheduled. Return sooner for concerns or questions  He expressed verbal understanding and agreed with the plan        Greater than 50% of the visit of 45 minutes was spent counseling, educating, and coordinating the care of the patient.      -In today's visit, at least 4 established conditions that pose a risk to life or bodily function have been addressed and the conditions are severe.    -In today's visit, monitoring for drug toxicity was accomplished.        Follow up as scheduled. Return sooner for concerns or questions  I have reviewed the patient's medical history in detail and updated the computerized patient record.    No orders of the defined types were placed in this encounter.    Patient's Medications   New Prescriptions    ISOSORBIDE MONONITRATE (IMDUR) 30 MG 24 HR TABLET    Take 1 tablet (30 mg total) by mouth once daily.   Previous Medications    ALBUTEROL (VENTOLIN HFA) 90 MCG/ACTUATION INHALER    Inhale 2 puffs into the lungs every 6 (six) hours as needed for Wheezing. Rescue    APIXABAN  (ELIQUIS) 5 MG TAB    Take 1 tablet (5 mg total) by mouth 2 (two) times daily.    ASPIRIN (ASPIR-81 ORAL)    Take 81 mg by mouth once daily.    CARVEDILOL (COREG) 25 MG TABLET    Take 1 tablet (25 mg total) by mouth 2 (two) times daily with meals.    COENZYME Q10 100 MG CAPSULE    Take 100 mg by mouth once daily.    FUROSEMIDE (LASIX) 40 MG TABLET    Take 1 tablet (40 mg total) by mouth once daily.    LEVOTHYROXINE (SYNTHROID) 25 MCG TABLET    Take one tablet PO every morning on an empty stomach and wait at least 1 hour before eating or taking other medications    NITROGLYCERIN (NITROSTAT) 0.4 MG SL TABLET    Place 1 tablet (0.4 mg total) under the tongue every 5 (five) minutes as needed for Chest pain.    ROSUVASTATIN (CRESTOR) 40 MG TAB    TAKE 1 TABLET(40 MG) BY MOUTH EVERY EVENING   Modified Medications    Modified Medication Previous Medication    EPLERENONE (INSPRA) 50 MG TAB eplerenone (INSPRA) 50 MG Tab       Take 1 tablet (50 mg total) by mouth once daily.    Take 1 tablet (50 mg total) by mouth once daily.   Discontinued Medications    No medications on file

## 2023-03-17 ENCOUNTER — OFFICE VISIT (OUTPATIENT)
Dept: CARDIOLOGY | Facility: CLINIC | Age: 63
End: 2023-03-17
Payer: MEDICAID

## 2023-03-17 VITALS
HEIGHT: 70 IN | BODY MASS INDEX: 25.88 KG/M2 | HEART RATE: 73 BPM | DIASTOLIC BLOOD PRESSURE: 84 MMHG | WEIGHT: 180.75 LBS | SYSTOLIC BLOOD PRESSURE: 143 MMHG

## 2023-03-17 DIAGNOSIS — I48.19 PERSISTENT ATRIAL FIBRILLATION: Primary | ICD-10-CM

## 2023-03-17 DIAGNOSIS — N18.4 CKD (CHRONIC KIDNEY DISEASE) STAGE 4, GFR 15-29 ML/MIN: ICD-10-CM

## 2023-03-17 DIAGNOSIS — I10 PRIMARY HYPERTENSION: ICD-10-CM

## 2023-03-17 DIAGNOSIS — N18.4 ANEMIA DUE TO STAGE 4 CHRONIC KIDNEY DISEASE: ICD-10-CM

## 2023-03-17 DIAGNOSIS — D63.1 ANEMIA DUE TO STAGE 4 CHRONIC KIDNEY DISEASE: ICD-10-CM

## 2023-03-17 DIAGNOSIS — I70.213 ATHEROSCLEROSIS OF NATIVE ARTERY OF BOTH LOWER EXTREMITIES WITH INTERMITTENT CLAUDICATION: ICD-10-CM

## 2023-03-17 DIAGNOSIS — I25.111 CORONARY ARTERY DISEASE INVOLVING NATIVE CORONARY ARTERY OF NATIVE HEART WITH ANGINA PECTORIS WITH DOCUMENTED SPASM: ICD-10-CM

## 2023-03-17 PROCEDURE — 99214 PR OFFICE/OUTPT VISIT, EST, LEVL IV, 30-39 MIN: ICD-10-PCS | Mod: S$PBB,,, | Performed by: INTERNAL MEDICINE

## 2023-03-17 PROCEDURE — 1159F PR MEDICATION LIST DOCUMENTED IN MEDICAL RECORD: ICD-10-PCS | Mod: CPTII,,, | Performed by: INTERNAL MEDICINE

## 2023-03-17 PROCEDURE — 1159F MED LIST DOCD IN RCRD: CPT | Mod: CPTII,,, | Performed by: INTERNAL MEDICINE

## 2023-03-17 PROCEDURE — 99999 PR PBB SHADOW E&M-EST. PATIENT-LVL III: CPT | Mod: PBBFAC,,, | Performed by: INTERNAL MEDICINE

## 2023-03-17 PROCEDURE — 3044F HG A1C LEVEL LT 7.0%: CPT | Mod: CPTII,,, | Performed by: INTERNAL MEDICINE

## 2023-03-17 PROCEDURE — 3077F SYST BP >= 140 MM HG: CPT | Mod: CPTII,,, | Performed by: INTERNAL MEDICINE

## 2023-03-17 PROCEDURE — 3077F PR MOST RECENT SYSTOLIC BLOOD PRESSURE >= 140 MM HG: ICD-10-PCS | Mod: CPTII,,, | Performed by: INTERNAL MEDICINE

## 2023-03-17 PROCEDURE — 3008F BODY MASS INDEX DOCD: CPT | Mod: CPTII,,, | Performed by: INTERNAL MEDICINE

## 2023-03-17 PROCEDURE — 3079F PR MOST RECENT DIASTOLIC BLOOD PRESSURE 80-89 MM HG: ICD-10-PCS | Mod: CPTII,,, | Performed by: INTERNAL MEDICINE

## 2023-03-17 PROCEDURE — 99213 OFFICE O/P EST LOW 20 MIN: CPT | Mod: PBBFAC,PO | Performed by: INTERNAL MEDICINE

## 2023-03-17 PROCEDURE — 3044F PR MOST RECENT HEMOGLOBIN A1C LEVEL <7.0%: ICD-10-PCS | Mod: CPTII,,, | Performed by: INTERNAL MEDICINE

## 2023-03-17 PROCEDURE — 1160F RVW MEDS BY RX/DR IN RCRD: CPT | Mod: CPTII,,, | Performed by: INTERNAL MEDICINE

## 2023-03-17 PROCEDURE — 99214 OFFICE O/P EST MOD 30 MIN: CPT | Mod: S$PBB,,, | Performed by: INTERNAL MEDICINE

## 2023-03-17 PROCEDURE — 99999 PR PBB SHADOW E&M-EST. PATIENT-LVL III: ICD-10-PCS | Mod: PBBFAC,,, | Performed by: INTERNAL MEDICINE

## 2023-03-17 PROCEDURE — 3079F DIAST BP 80-89 MM HG: CPT | Mod: CPTII,,, | Performed by: INTERNAL MEDICINE

## 2023-03-17 PROCEDURE — 3008F PR BODY MASS INDEX (BMI) DOCUMENTED: ICD-10-PCS | Mod: CPTII,,, | Performed by: INTERNAL MEDICINE

## 2023-03-17 PROCEDURE — 1160F PR REVIEW ALL MEDS BY PRESCRIBER/CLIN PHARMACIST DOCUMENTED: ICD-10-PCS | Mod: CPTII,,, | Performed by: INTERNAL MEDICINE

## 2023-03-17 NOTE — PROGRESS NOTES
Subjective:    Patient ID:  Domingo Gross is a 62 y.o. male who presents for evaluation of Atrial Fibrillation    Referring Cardiologist: Keo Jenkins MD  Primary Care Physician: Analy Encarnacion MD    HPI  Prior Hx:  I had the pleasure of seeing Mr. Gross today in our electrophysiology clinic in consultation for his atrial arrhythmia. As you are aware he is a pleasant 62 year-old man with hypertension, chronic kidney disease stage IV, peripheral arterial disease with multiple interventions on lower extremity arteries, coronary artery disease (mid LAD/prox RCA PCI 3/2019 and prox LCA in 10/2019 following out of hospital cardiac arrest), cardiac arrest in setting of prox LCX occlusion treated with PCI with normal LV function, chronic anemia reportedly secondary to chronic renal disease although has had heme + stools and EGD 1/2022 noted non-bleeding erosive gastropathy and colonoscopy noted hemorrhoids and polyps, and paroxysmal atrial fibrillation. He was first diagnosed with atrial fibrillation post-resuscitation for his out-of-hospital cardiac arrest (assume VF, required in field shock/CPR). He spontaneously converted. There is no ECG however of this in the system. Coronary angiography disclosed 99% prox-LCX stenosis.  He was hospitalized for chest discomfort and shortness of breath in November of 2022. He was observed to be in atrial fibrillation. He was not started on anticoagulation as his Hgb was 6.4. He was transfused. He is on plavix and aspirin. A follow-up outpatient ECG noted sinus rhythm.    ECHO 11/2022: preserved LV function, mild MR, normal LA size    I reviewed all available ECGs in Epic which show either sinus rhythm of AF.    At our initial visit in November of 2022 we discussed that his anemia has been felt to be in the setting of chronic renal disease and not bleeding. He was being treated now with EPO injections. We resumed eliquis. Plan was to monitor him for a bit to ensure he was  tolerating eliquis then would discuss DCCV.    Interim Hx:  Mr. Gross returns for follow-up. Recently spoke with him on the phone regarding confusion on whether he was on eliquis or not. His wife confirmed he was on eliquis and aspirin. He has been having angina. Dr. Jenkins has a stress test ordered. He was initiated on Imdur. His Hgb has been stable (9-10).    My interpretation of today's in clinic ECG is AF with an average ventricular rate of 73 bpm with PVCs (outflow tract)    Review of Systems   Constitutional: Positive for malaise/fatigue. Negative for fever.   HENT:  Negative for congestion and sore throat.    Eyes:  Negative for blurred vision and visual disturbance.   Cardiovascular:  Positive for chest pain. Negative for dyspnea on exertion, irregular heartbeat, near-syncope, palpitations and syncope.   Respiratory:  Negative for cough and shortness of breath.    Hematologic/Lymphatic: Negative for bleeding problem. Does not bruise/bleed easily.   Skin: Negative.    Musculoskeletal: Negative.    Gastrointestinal:  Negative for bloating, abdominal pain, hematochezia and melena.   Neurological:  Negative for focal weakness and weakness.   Psychiatric/Behavioral: Negative.        Objective:    Physical Exam  Vitals reviewed.   Constitutional:       General: He is not in acute distress.     Appearance: He is well-developed. He is not diaphoretic.   HENT:      Head: Normocephalic and atraumatic.   Eyes:      General:         Right eye: No discharge.         Left eye: No discharge.      Conjunctiva/sclera: Conjunctivae normal.   Cardiovascular:      Rate and Rhythm: Normal rate. Rhythm irregularly irregular.      Heart sounds: No murmur heard.    No friction rub. No gallop.   Pulmonary:      Effort: Pulmonary effort is normal. No respiratory distress.      Breath sounds: Normal breath sounds. No wheezing or rales.   Abdominal:      General: Bowel sounds are normal. There is no distension.      Palpations:  Abdomen is soft.      Tenderness: There is no abdominal tenderness.   Musculoskeletal:      Cervical back: Neck supple.   Skin:     General: Skin is warm and dry.   Neurological:      Mental Status: He is alert and oriented to person, place, and time.   Psychiatric:         Behavior: Behavior normal.         Thought Content: Thought content normal.         Judgment: Judgment normal.         Assessment:       1. Persistent atrial fibrillation    2. Coronary artery disease involving native coronary artery of native heart with angina pectoris with documented spasm    3. Atherosclerosis of native artery of both lower extremities with intermittent claudication    4. Primary hypertension    5. CKD (chronic kidney disease) stage 4, GFR 15-29 ml/min    6. Anemia due to stage 4 chronic kidney disease         Plan:       In summary, Mr. Gross is a pleasant 62 year-old man with hypertension, chronic kidney disease stage IV, peripheral arterial disease with multiple interventions on lower extremity arteries, coronary artery disease (mid LAD/prox RCA PCI 3/2019 and prox LCA in 10/2019 following out of hospital cardiac arrest), cardiac arrest in setting of prox LCX occlusion treated with PCI with normal LV function, chronic anemia reportedly secondary to chronic renal disease although has had heme + stools and EGD 1/2022 noted non-bleeding erosive gastropathy and colonoscopy noted hemorrhoids and polyps, and paroxysmal atrial fibrillation. I had a long discussion with the patient about the pathophysiology and risks of atrial fibrillation and its basic pathophysiology, including its health implications and treatment options. Specifically, I addressed the need for CVA (stroke) prophylaxis with aspirin versus oral anticoagulation (warfarin vs DOACs, discussed bleeding risks, and need to come to the ER for any head trauma for CT scanning even if asymptomatic). His [id number] score is 2 and oral anticoagulation is indicated. He is  tolerating eliquis. He has an upcoming stress test. Recommend proceeding with JOSE/DCCV then initiation of amiodarone once coronary studies/procedures are completed.    Thank you for allowing me to participate in the care of this patient. Please do not hesitate to call me with any questions or concerns.    Dmitriy Chavarria MD, PhD  Cardiac Electrophysiology

## 2023-03-20 ENCOUNTER — HOSPITAL ENCOUNTER (OUTPATIENT)
Dept: CARDIOLOGY | Facility: HOSPITAL | Age: 63
Discharge: HOME | End: 2023-03-20
Attending: INTERNAL MEDICINE
Payer: MEDICAID

## 2023-03-20 ENCOUNTER — HOSPITAL ENCOUNTER (OUTPATIENT)
Dept: RADIOLOGY | Facility: HOSPITAL | Age: 63
Discharge: HOME | End: 2023-03-20
Attending: INTERNAL MEDICINE
Payer: COMMERCIAL

## 2023-03-20 PROCEDURE — 78452 HT MUSCLE IMAGE SPECT MULT: CPT | Mod: TC

## 2023-03-20 PROCEDURE — 93017 CV STRESS TEST TRACING ONLY: CPT

## 2023-03-20 PROCEDURE — 93018 CV STRESS TEST I&R ONLY: CPT | Mod: ,,, | Performed by: INTERNAL MEDICINE

## 2023-03-20 PROCEDURE — 93016 CV STRESS TEST SUPVJ ONLY: CPT | Mod: ,,, | Performed by: INTERNAL MEDICINE

## 2023-03-20 PROCEDURE — 93018 NUCLEAR STRESS TEST (CUPID ONLY): ICD-10-PCS | Mod: ,,, | Performed by: INTERNAL MEDICINE

## 2023-03-20 PROCEDURE — A9502 TC99M TETROFOSMIN: HCPCS

## 2023-03-20 PROCEDURE — 78452 NM MYOCARDIAL PERFUSION SPECT MULTI PHARM: ICD-10-PCS | Mod: 26,,, | Performed by: RADIOLOGY

## 2023-03-20 PROCEDURE — 78452 HT MUSCLE IMAGE SPECT MULT: CPT | Mod: 26,,, | Performed by: RADIOLOGY

## 2023-03-20 PROCEDURE — 93016 NUCLEAR STRESS TEST (CUPID ONLY): ICD-10-PCS | Mod: ,,, | Performed by: INTERNAL MEDICINE

## 2023-03-20 RX ORDER — REGADENOSON 0.08 MG/ML
0.4 INJECTION, SOLUTION INTRAVENOUS ONCE
Status: DISCONTINUED | OUTPATIENT
Start: 2023-03-20 | End: 2023-03-28 | Stop reason: HOSPADM

## 2023-03-21 DIAGNOSIS — R94.39 ABNORMAL CARDIOVASCULAR STRESS TEST: Primary | ICD-10-CM

## 2023-03-21 LAB
CV STRESS BASE HR: 80 BPM
DIASTOLIC BLOOD PRESSURE: 89 MMHG
OHS CV CPX 85 PERCENT MAX PREDICTED HEART RATE MALE: 135
OHS CV CPX MAX PREDICTED HEART RATE: 159
OHS CV CPX PATIENT IS FEMALE: 0
OHS CV CPX PATIENT IS MALE: 1
OHS CV CPX PEAK DIASTOLIC BLOOD PRESSURE: 76 MMHG
OHS CV CPX PEAK HEAR RATE: 107 BPM
OHS CV CPX PEAK RATE PRESSURE PRODUCT: NORMAL
OHS CV CPX PEAK SYSTOLIC BLOOD PRESSURE: 168 MMHG
OHS CV CPX PERCENT MAX PREDICTED HEART RATE ACHIEVED: 67
OHS CV CPX RATE PRESSURE PRODUCT PRESENTING: NORMAL
STRESS ST DEPRESSION: 1 MM
SYSTOLIC BLOOD PRESSURE: 157 MMHG

## 2023-03-21 RX ORDER — DIPHENHYDRAMINE HCL 50 MG
50 CAPSULE ORAL ONCE
Status: CANCELLED | OUTPATIENT
Start: 2023-03-21 | End: 2023-03-21

## 2023-03-21 RX ORDER — SODIUM CHLORIDE 9 MG/ML
INJECTION, SOLUTION INTRAVENOUS CONTINUOUS
Status: CANCELLED | OUTPATIENT
Start: 2023-03-21 | End: 2023-03-21

## 2023-03-21 NOTE — PROGRESS NOTES
His stress is abnormal       He will proceed with [facility]  R radial access        Consent will be signed on the day of the procedure

## 2023-03-22 ENCOUNTER — OFFICE VISIT (OUTPATIENT)
Dept: CARDIOLOGY | Facility: CLINIC | Age: 63
End: 2023-03-22
Payer: MEDICAID

## 2023-03-22 DIAGNOSIS — R94.39 ABNORMAL CARDIOVASCULAR STRESS TEST: ICD-10-CM

## 2023-03-22 DIAGNOSIS — I87.2 VENOUS INSUFFICIENCY OF BOTH LOWER EXTREMITIES: ICD-10-CM

## 2023-03-22 DIAGNOSIS — D63.1 ANEMIA DUE TO STAGE 4 CHRONIC KIDNEY DISEASE: ICD-10-CM

## 2023-03-22 DIAGNOSIS — I65.23 BILATERAL CAROTID ARTERY STENOSIS: ICD-10-CM

## 2023-03-22 DIAGNOSIS — E78.2 MIXED HYPERLIPIDEMIA: Chronic | ICD-10-CM

## 2023-03-22 DIAGNOSIS — N18.4 ANEMIA DUE TO STAGE 4 CHRONIC KIDNEY DISEASE: ICD-10-CM

## 2023-03-22 DIAGNOSIS — I73.9 CLAUDICATION IN PERIPHERAL VASCULAR DISEASE: ICD-10-CM

## 2023-03-22 DIAGNOSIS — I70.213 ATHEROSCLEROSIS OF NATIVE ARTERY OF BOTH LOWER EXTREMITIES WITH INTERMITTENT CLAUDICATION: ICD-10-CM

## 2023-03-22 DIAGNOSIS — I25.111 CORONARY ARTERY DISEASE INVOLVING NATIVE CORONARY ARTERY OF NATIVE HEART WITH ANGINA PECTORIS WITH DOCUMENTED SPASM: Primary | ICD-10-CM

## 2023-03-22 DIAGNOSIS — N18.4 CKD (CHRONIC KIDNEY DISEASE) STAGE 4, GFR 15-29 ML/MIN: ICD-10-CM

## 2023-03-22 PROCEDURE — 99215 OFFICE O/P EST HI 40 MIN: CPT | Mod: 95,,, | Performed by: INTERNAL MEDICINE

## 2023-03-22 PROCEDURE — 99215 PR OFFICE/OUTPT VISIT, EST, LEVL V, 40-54 MIN: ICD-10-PCS | Mod: 95,,, | Performed by: INTERNAL MEDICINE

## 2023-03-22 PROCEDURE — 3044F PR MOST RECENT HEMOGLOBIN A1C LEVEL <7.0%: ICD-10-PCS | Mod: CPTII,95,, | Performed by: INTERNAL MEDICINE

## 2023-03-22 PROCEDURE — 3044F HG A1C LEVEL LT 7.0%: CPT | Mod: CPTII,95,, | Performed by: INTERNAL MEDICINE

## 2023-03-22 NOTE — H&P (VIEW-ONLY)
Subjective:   Patient ID:  Domingo Gross is a 62 y.o. male who presents for follow up of Coronary Artery Disease, Hypertension, Hyperlipidemia, Atrial Fibrillation, and Abnormal Stress Test        HPI:           The patient location is: home    Visit type: Virtual visit with synchronous audio and video  Total time spent with patient: 35 minutes of chart review, discussion, medications update, and charting.   Each patient to whom we provide medical services by telemedicine is:  (1) informed of the relationship between the physician and patient and the respective role of any other health care provider with respect to management of the patient; and (2) notified that he or she may decline to receive medical services by telemedicine and may withdraw from such care at any time.              Domingo Gross 62 y.o. male is here follow up and complaining of chest discomfort.  He is taken nitroglycerin at least once.  Worse pain 5/10.  He is compliant with his medications.  He is tolerating Eliquis well.        He has a history CAD, HTN, HLP, PAF in NSR, edema, CKD, anemia related to CKD, and PAD with winsome IIb claudication. He is s/p bilateral SFA, GRUPO, EIA, and R CFA revascularization. His peripheral vascular intervention in 12/2021 was cancelled due to severe anemia with a H/H that required a transfusion with subsequent ongoing intervention by heme/onc. He has a GI work up for anemia was normal. His H/H has improved with heme/onc care.        He was admitted 10/4/2019 for cardiac arrest. He was fully rescued. Initial rhythm was afib with rvr. He was not taken immediately to the cath lab because of ARF + hypokalemia. He had a diagnostic angiogram which revealed patent LM, LAD, RCA stent, and new ostial LCX lesion 99% with R to L collaterals. He had PCI with REZA after his renal function returned to baseline.         8/19/2021 with CP HTN urgency with . Added nifedipine and BP has been stable. No  CP            ELISABETH with stress 5/2017:   R 1.01 to 0.44   L 0.92 to 0.45    After 6 minutes ambulation      CTA 5/2017:       Patent distal aorta and bilateral GRUPO/EIA stents   L EIA with de shawna 90% stenosis   L SFA 80% with 3 vessel run off     R EIA/CFA with moderate disease   R SFA 80% stenosis with 3 vessel run off        Peripheral angiogram with intervention 6/2017         Patent bilateral GRUPO/EIA stents.    De shawna 80% left EIA stenosis treated with 9.0 x 80 mm Lifestar  stent post dilated with 8.0 mm balloon.    Bilateral 70-80% SFA stenosis with 3 vessel run off.        3/2018      L SFA intervention for claudication   75% L SFA with 3 vessel run off   7 mmHg resting and 33 mmHg hyperemic mean gradient         L CFA antegrade access   PTA with 6.0 x 150 mm   PTA with 6.0 x 150 mm Lutonix   3 vessel run off           4/13/2018       S/p R SFA PTA for winsome IIb claudication   R CFA antegrade access         R CFA with 50% stenosis-heavily calcified lesion               Baseline /90         R SFA with 70% stenosis with a significant resting and hyperemic mean gradient     3 vessel run off             PTA of R SFA with 6.0 x 150 + 6.0 x 60 mm Lutonix DCB        5/2/2018   Patent arteries post revascularization        2/2019     R 0.84 to 0.27   L 0.90 to 0.66   After ambulation   Severe R calf claudication          Nuclear stress test 2/2019     + ECG with 2 mm ST depression   No wall motion abnormalities   Test stopped at 6 minutes, 7 METs, 86% predicted heart rate   Stopped because of R leg claudication + ECG changes          S/p PCI RCA and LAD with REZA 3/2019       RCA 3.5 x 22 mm Resolute REZA   LAD 2.5 x 30 and 2.5 x 25 mm Resolute REZA      Peripheral aortogram 5/10/2019     R CFA 95% stenosis   3 vessel run off        Seen by Dr. Giron for R CFA endarterectomy but preferable should be off plavix for at least 5 days. He has a risk for stent thrombosis with premature interruption of DAPT. He  later had R CFA atherectomy + PTA with DCB.           10/11/2019 for cardiac arrest        S/p staged LCX PCI                    L CFA access              IVUS guided PCI              3.0 x 15 mm Resolute REZA post dilated with 3.5 NC balloon         PET stress 2/2020     No ischemia      Echo 2/2021      EF 55%   Normal diastolic fn   Normal RV fn   Mild MR/TR/SD         Arterial US 7/2021     Bilateral SFA disease > 70      Venous US 7/2021     No DVT   No superficial reflux   R POP vein reflux > 500 msec    Echo 8/2021      Normal EF   Normal RV fn   Mild-mod MR   Mod SD   PASP 26 mmHg        ELISABETH 11/2021      Resting ELISABETH right 0.84 and left 0.83   Exercise ELISABETH right 0.39 and left 0.32 after 3.5 minutes ambulation   TBI right 0.58 and left 0.48          US 11/2021      Bilateral mid SFA high grade disease      S/p PTA of L CFA + SFA with atherectomy + DCB (5/2022)       S/p PTA of R CFA with atherectomy + DCB (8/2022)      Echo 11/2022     Nl EF   Normal LA   Mild MR/TR/SD         Nuc stress test 3/2023      ECG portion of the study is positive for ischemia.   Specificity is reduced secondary to resting ST abnormalities.  Reversible ischemia along the inferior wall, near the base of LV with hypokinesis.  Dilated left ventricle.  Decreased ejection fraction estimated to be 34% during stress and 48% during rest.    Patient Active Problem List    Diagnosis Date Noted    Cardiac arrest 10/04/2019     Priority: High    Atherosclerosis of native artery of both lower extremities with intermittent claudication 03/02/2018     Priority: High    Hypothyroidism 03/02/2023    Unstable angina 03/02/2023     I have messaged his cardiologist and electrophysiologist about this. Currently awaiting response       Persistent atrial fibrillation 11/12/2022    Anemia due to stage 4 chronic kidney disease 12/15/2021    CKD (chronic kidney disease) stage 4, GFR 15-29 ml/min 08/20/2021    COPD (chronic obstructive pulmonary disease)  08/20/2021    Nuclear sclerosis, bilateral 06/08/2020    Nephrotic range proteinuria 04/06/2020    Hypokalemia 10/04/2019    Bilateral carotid artery stenosis     Coronary artery disease involving native coronary artery of native heart with angina pectoris with documented spasm 03/29/2019         3/29/2019    S/p LHC via R radial           LM, LAD, and LCX are patent with luminal irregularities  RCA mid 95% calcified lesion  Normal EF with LVEDP 8 mmHg           PCI of mid LAD with 2.5 x 30 and 2.5 x 15 mm Resolute REZA  PCI of proximal RCA to treat guide dissection with 3.5 x 22 Resolute REZA        10/11/2019 for cardiac arrest        S/p staged LCX PCI                    L CFA access              IVUS guided PCI              3.0 x 15 mm Resolute REZA post dilated with 3.5 NC balloon           Abnormal cardiovascular stress test 02/27/2019         Nuclear stress test 2/2019     + ECG with 2 mm ST depression   No wall motion abnormalities   Test stopped at 6 minutes, 7 METs, 86% predicted heart rate   Stopped because of R leg claudication + ECG changes            Venous insufficiency of both lower extremities 06/04/2018    Hyperlipidemia 06/12/2015    DJD (degenerative joint disease), lumbar 05/27/2015    DDD (degenerative disc disease) 05/27/2015    Claudication in peripheral vascular disease 06/13/2014    HTN (hypertension) 04/29/2013                      LAST HbA1c  Lab Results   Component Value Date    HGBA1C 5.2 02/27/2023       Lipid panel  Lab Results   Component Value Date    CHOL 144 02/27/2023    CHOL 116 (L) 09/02/2022    CHOL 105 (L) 08/20/2021     Lab Results   Component Value Date    HDL 55 02/27/2023    HDL 50 09/02/2022    HDL 44 08/20/2021     Lab Results   Component Value Date    LDLCALC 75.8 02/27/2023    LDLCALC 41.6 (L) 09/02/2022    LDLCALC 51.0 (L) 08/20/2021     Lab Results   Component Value Date    TRIG 66 02/27/2023    TRIG 122 09/02/2022    TRIG 50 08/20/2021     Lab Results   Component  Value Date    CHOLHDL 38.2 02/27/2023    CHOLHDL 43.1 09/02/2022    CHOLHDL 41.9 08/20/2021            Review of Systems   Constitutional: Negative for diaphoresis, night sweats, weight gain and weight loss.   HENT:  Negative for congestion.    Eyes:  Negative for blurred vision, discharge and double vision.   Cardiovascular:  Negative for chest pain, claudication, cyanosis, dyspnea on exertion, irregular heartbeat, leg swelling, near-syncope, orthopnea, palpitations, paroxysmal nocturnal dyspnea and syncope.   Respiratory:  Negative for cough, shortness of breath and wheezing.    Endocrine: Negative for cold intolerance, heat intolerance and polyphagia.   Hematologic/Lymphatic: Negative for adenopathy and bleeding problem. Does not bruise/bleed easily.   Skin:  Negative for dry skin and nail changes.   Musculoskeletal:  Negative for arthritis, back pain, falls, joint pain, myalgias and neck pain.   Gastrointestinal:  Negative for bloating, abdominal pain, change in bowel habit and constipation.   Genitourinary:  Negative for bladder incontinence, dysuria, flank pain, genital sores and missed menses.   Neurological:  Negative for aphonia, brief paralysis, difficulty with concentration, dizziness and weakness.   Psychiatric/Behavioral:  Negative for altered mental status and memory loss. The patient does not have insomnia.    Allergic/Immunologic: Negative for environmental allergies.     Objective:   Physical Exam  Constitutional:       Appearance: He is well-developed.      Interventions: He is not intubated.  HENT:      Head: Normocephalic and atraumatic.      Right Ear: External ear normal.      Left Ear: External ear normal.   Eyes:      General: No scleral icterus.        Right eye: No discharge.         Left eye: No discharge.      Conjunctiva/sclera: Conjunctivae normal.      Pupils: Pupils are equal, round, and reactive to light.   Neck:      Thyroid: No thyromegaly.      Vascular: Normal carotid pulses. No  carotid bruit, hepatojugular reflux or JVD.      Trachea: No tracheal deviation.   Cardiovascular:      Rate and Rhythm: Normal rate and regular rhythm. No extrasystoles are present.     Chest Wall: PMI is not displaced.      Pulses:           Carotid pulses are 2+ on the right side and 2+ on the left side.       Radial pulses are 2+ on the right side and 2+ on the left side.        Femoral pulses are 2+ on the right side and 2+ on the left side.       Popliteal pulses are 2+ on the right side and 2+ on the left side.        Dorsalis pedis pulses are 1+ on the right side and 2+ on the left side.        Posterior tibial pulses are 1+ on the right side and 2+ on the left side.      Heart sounds: S1 normal and S2 normal. Heart sounds not distant. No midsystolic click. No murmur heard.    No friction rub. No gallop. No S3 sounds.      Comments:       Biphasic R DP and weak but biphasic R PT doppler signals       Biphasic L DP and PT doppler signals          Pulmonary:      Effort: Pulmonary effort is normal. No tachypnea, bradypnea, accessory muscle usage or respiratory distress. He is not intubated.      Breath sounds: Normal breath sounds. No stridor. No decreased breath sounds, wheezing or rales.   Chest:      Chest wall: No tenderness.   Abdominal:      General: There is no distension or abdominal bruit.      Palpations: There is no mass or pulsatile mass.      Tenderness: There is no abdominal tenderness. There is no guarding or rebound.   Musculoskeletal:         General: No tenderness. Normal range of motion.      Cervical back: Normal range of motion and neck supple.   Lymphadenopathy:      Cervical: No cervical adenopathy.   Skin:     General: Skin is warm.      Coloration: Skin is not pale.      Findings: No erythema or rash.   Neurological:      Mental Status: He is alert and oriented to person, place, and time.      Cranial Nerves: No cranial nerve deficit.      Coordination: Coordination normal.      Deep  Tendon Reflexes: Reflexes are normal and symmetric.   Psychiatric:         Behavior: Behavior normal.         Thought Content: Thought content normal.         Judgment: Judgment normal.         Assessment:     1. Coronary artery disease involving native coronary artery of native heart with angina pectoris with documented spasm    2. Atherosclerosis of native artery of both lower extremities with intermittent claudication    3. Mixed hyperlipidemia    4. Bilateral carotid artery stenosis    5. Claudication in peripheral vascular disease    6. CKD (chronic kidney disease) stage 4, GFR 15-29 ml/min    7. Abnormal cardiovascular stress test    8. Venous insufficiency of both lower extremities    9. Anemia due to stage 4 chronic kidney disease        Plan:             He had an abnormal stress test he will proceed with coronary angiography plus minus intervention.  Imdur 30 mg was added to his medications.       R radial access  REZA candidate          He was instructed to go to the emergency room he has any recurrent discomfort without resolution.  He expressed verbal understanding of the plan.  All his questions were answered to his satisfaction.    Medical therapy for CAD  DAPT with aspirin + plavix  Intense statin therapy  Arb  Pletal       Target LDL < 70  Low salt diet  Weight loss  Target BP < 130/80 mmHg          Continue with current medical plan and lifestyle changes.  Return sooner for concerns or questions. If symptoms persist go to the ED  I have reviewed all pertinent data on this patient           Follow up as scheduled. Return sooner for concerns or questions  He expressed verbal understanding and agreed with the plan        Greater than 50% of the visit of 45 minutes was spent counseling, educating, and coordinating the care of the patient.      -In today's visit, at least 4 established conditions that pose a risk to life or bodily function have been addressed and the conditions are severe.    -In today's  visit, monitoring for drug toxicity was accomplished.        Follow up as scheduled. Return sooner for concerns or questions  I have reviewed the patient's medical history in detail and updated the computerized patient record.    No orders of the defined types were placed in this encounter.    Patient's Medications   New Prescriptions    No medications on file   Previous Medications    ALBUTEROL (VENTOLIN HFA) 90 MCG/ACTUATION INHALER    Inhale 2 puffs into the lungs every 6 (six) hours as needed for Wheezing. Rescue    APIXABAN (ELIQUIS) 5 MG TAB    Take 1 tablet (5 mg total) by mouth 2 (two) times daily.    ASPIRIN (ASPIR-81 ORAL)    Take 81 mg by mouth once daily.    CARVEDILOL (COREG) 25 MG TABLET    Take 1 tablet (25 mg total) by mouth 2 (two) times daily with meals.    COENZYME Q10 100 MG CAPSULE    Take 100 mg by mouth once daily.    EPLERENONE (INSPRA) 50 MG TAB    Take 1 tablet (50 mg total) by mouth once daily.    FUROSEMIDE (LASIX) 40 MG TABLET    Take 1 tablet (40 mg total) by mouth once daily.    ISOSORBIDE MONONITRATE (IMDUR) 30 MG 24 HR TABLET    Take 1 tablet (30 mg total) by mouth once daily.    LEVOTHYROXINE (SYNTHROID) 25 MCG TABLET    Take one tablet PO every morning on an empty stomach and wait at least 1 hour before eating or taking other medications    NITROGLYCERIN (NITROSTAT) 0.4 MG SL TABLET    Place 1 tablet (0.4 mg total) under the tongue every 5 (five) minutes as needed for Chest pain.    ROSUVASTATIN (CRESTOR) 40 MG TAB    TAKE 1 TABLET(40 MG) BY MOUTH EVERY EVENING   Modified Medications    No medications on file   Discontinued Medications    No medications on file

## 2023-03-22 NOTE — PROGRESS NOTES
Subjective:   Patient ID:  Domingo Gross is a 62 y.o. male who presents for follow up of Coronary Artery Disease, Hypertension, Hyperlipidemia, Atrial Fibrillation, and Abnormal Stress Test        HPI:           The patient location is: home    Visit type: Virtual visit with synchronous audio and video  Total time spent with patient: 35 minutes of chart review, discussion, medications update, and charting.   Each patient to whom we provide medical services by telemedicine is:  (1) informed of the relationship between the physician and patient and the respective role of any other health care provider with respect to management of the patient; and (2) notified that he or she may decline to receive medical services by telemedicine and may withdraw from such care at any time.              Domingo Gross 62 y.o. male is here follow up and complaining of chest discomfort.  He is taken nitroglycerin at least once.  Worse pain 5/10.  He is compliant with his medications.  He is tolerating Eliquis well.        He has a history CAD, HTN, HLP, PAF in NSR, edema, CKD, anemia related to CKD, and PAD with winsome IIb claudication. He is s/p bilateral SFA, GRUPO, EIA, and R CFA revascularization. His peripheral vascular intervention in 12/2021 was cancelled due to severe anemia with a H/H that required a transfusion with subsequent ongoing intervention by heme/onc. He has a GI work up for anemia was normal. His H/H has improved with heme/onc care.        He was admitted 10/4/2019 for cardiac arrest. He was fully rescued. Initial rhythm was afib with rvr. He was not taken immediately to the cath lab because of ARF + hypokalemia. He had a diagnostic angiogram which revealed patent LM, LAD, RCA stent, and new ostial LCX lesion 99% with R to L collaterals. He had PCI with REZA after his renal function returned to baseline.         8/19/2021 with CP HTN urgency with . Added nifedipine and BP has been stable. No  CP            ELISABETH with stress 5/2017:   R 1.01 to 0.44   L 0.92 to 0.45    After 6 minutes ambulation      CTA 5/2017:       Patent distal aorta and bilateral GRUPO/EIA stents   L EIA with de shawna 90% stenosis   L SFA 80% with 3 vessel run off     R EIA/CFA with moderate disease   R SFA 80% stenosis with 3 vessel run off        Peripheral angiogram with intervention 6/2017         Patent bilateral GRUPO/EIA stents.    De shawna 80% left EIA stenosis treated with 9.0 x 80 mm Lifestar  stent post dilated with 8.0 mm balloon.    Bilateral 70-80% SFA stenosis with 3 vessel run off.        3/2018      L SFA intervention for claudication   75% L SFA with 3 vessel run off   7 mmHg resting and 33 mmHg hyperemic mean gradient         L CFA antegrade access   PTA with 6.0 x 150 mm   PTA with 6.0 x 150 mm Lutonix   3 vessel run off           4/13/2018       S/p R SFA PTA for winsome IIb claudication   R CFA antegrade access         R CFA with 50% stenosis-heavily calcified lesion               Baseline /90         R SFA with 70% stenosis with a significant resting and hyperemic mean gradient     3 vessel run off             PTA of R SFA with 6.0 x 150 + 6.0 x 60 mm Lutonix DCB        5/2/2018   Patent arteries post revascularization        2/2019     R 0.84 to 0.27   L 0.90 to 0.66   After ambulation   Severe R calf claudication          Nuclear stress test 2/2019     + ECG with 2 mm ST depression   No wall motion abnormalities   Test stopped at 6 minutes, 7 METs, 86% predicted heart rate   Stopped because of R leg claudication + ECG changes          S/p PCI RCA and LAD with REZA 3/2019       RCA 3.5 x 22 mm Resolute REZA   LAD 2.5 x 30 and 2.5 x 25 mm Resolute REZA      Peripheral aortogram 5/10/2019     R CFA 95% stenosis   3 vessel run off        Seen by Dr. Giron for R CFA endarterectomy but preferable should be off plavix for at least 5 days. He has a risk for stent thrombosis with premature interruption of DAPT. He  later had R CFA atherectomy + PTA with DCB.           10/11/2019 for cardiac arrest        S/p staged LCX PCI                    L CFA access              IVUS guided PCI              3.0 x 15 mm Resolute REZA post dilated with 3.5 NC balloon         PET stress 2/2020     No ischemia      Echo 2/2021      EF 55%   Normal diastolic fn   Normal RV fn   Mild MR/TR/VA         Arterial US 7/2021     Bilateral SFA disease > 70      Venous US 7/2021     No DVT   No superficial reflux   R POP vein reflux > 500 msec    Echo 8/2021      Normal EF   Normal RV fn   Mild-mod MR   Mod VA   PASP 26 mmHg        ELISABETH 11/2021      Resting ELISABETH right 0.84 and left 0.83   Exercise ELISABETH right 0.39 and left 0.32 after 3.5 minutes ambulation   TBI right 0.58 and left 0.48          US 11/2021      Bilateral mid SFA high grade disease      S/p PTA of L CFA + SFA with atherectomy + DCB (5/2022)       S/p PTA of R CFA with atherectomy + DCB (8/2022)      Echo 11/2022     Nl EF   Normal LA   Mild MR/TR/VA         Nuc stress test 3/2023      ECG portion of the study is positive for ischemia.   Specificity is reduced secondary to resting ST abnormalities.  Reversible ischemia along the inferior wall, near the base of LV with hypokinesis.  Dilated left ventricle.  Decreased ejection fraction estimated to be 34% during stress and 48% during rest.    Patient Active Problem List    Diagnosis Date Noted    Cardiac arrest 10/04/2019     Priority: High    Atherosclerosis of native artery of both lower extremities with intermittent claudication 03/02/2018     Priority: High    Hypothyroidism 03/02/2023    Unstable angina 03/02/2023     I have messaged his cardiologist and electrophysiologist about this. Currently awaiting response       Persistent atrial fibrillation 11/12/2022    Anemia due to stage 4 chronic kidney disease 12/15/2021    CKD (chronic kidney disease) stage 4, GFR 15-29 ml/min 08/20/2021    COPD (chronic obstructive pulmonary disease)  08/20/2021    Nuclear sclerosis, bilateral 06/08/2020    Nephrotic range proteinuria 04/06/2020    Hypokalemia 10/04/2019    Bilateral carotid artery stenosis     Coronary artery disease involving native coronary artery of native heart with angina pectoris with documented spasm 03/29/2019         3/29/2019    S/p LHC via R radial           LM, LAD, and LCX are patent with luminal irregularities  RCA mid 95% calcified lesion  Normal EF with LVEDP 8 mmHg           PCI of mid LAD with 2.5 x 30 and 2.5 x 15 mm Resolute REZA  PCI of proximal RCA to treat guide dissection with 3.5 x 22 Resolute REZA        10/11/2019 for cardiac arrest        S/p staged LCX PCI                    L CFA access              IVUS guided PCI              3.0 x 15 mm Resolute REZA post dilated with 3.5 NC balloon           Abnormal cardiovascular stress test 02/27/2019         Nuclear stress test 2/2019     + ECG with 2 mm ST depression   No wall motion abnormalities   Test stopped at 6 minutes, 7 METs, 86% predicted heart rate   Stopped because of R leg claudication + ECG changes            Venous insufficiency of both lower extremities 06/04/2018    Hyperlipidemia 06/12/2015    DJD (degenerative joint disease), lumbar 05/27/2015    DDD (degenerative disc disease) 05/27/2015    Claudication in peripheral vascular disease 06/13/2014    HTN (hypertension) 04/29/2013                      LAST HbA1c  Lab Results   Component Value Date    HGBA1C 5.2 02/27/2023       Lipid panel  Lab Results   Component Value Date    CHOL 144 02/27/2023    CHOL 116 (L) 09/02/2022    CHOL 105 (L) 08/20/2021     Lab Results   Component Value Date    HDL 55 02/27/2023    HDL 50 09/02/2022    HDL 44 08/20/2021     Lab Results   Component Value Date    LDLCALC 75.8 02/27/2023    LDLCALC 41.6 (L) 09/02/2022    LDLCALC 51.0 (L) 08/20/2021     Lab Results   Component Value Date    TRIG 66 02/27/2023    TRIG 122 09/02/2022    TRIG 50 08/20/2021     Lab Results   Component  Value Date    CHOLHDL 38.2 02/27/2023    CHOLHDL 43.1 09/02/2022    CHOLHDL 41.9 08/20/2021            Review of Systems   Constitutional: Negative for diaphoresis, night sweats, weight gain and weight loss.   HENT:  Negative for congestion.    Eyes:  Negative for blurred vision, discharge and double vision.   Cardiovascular:  Negative for chest pain, claudication, cyanosis, dyspnea on exertion, irregular heartbeat, leg swelling, near-syncope, orthopnea, palpitations, paroxysmal nocturnal dyspnea and syncope.   Respiratory:  Negative for cough, shortness of breath and wheezing.    Endocrine: Negative for cold intolerance, heat intolerance and polyphagia.   Hematologic/Lymphatic: Negative for adenopathy and bleeding problem. Does not bruise/bleed easily.   Skin:  Negative for dry skin and nail changes.   Musculoskeletal:  Negative for arthritis, back pain, falls, joint pain, myalgias and neck pain.   Gastrointestinal:  Negative for bloating, abdominal pain, change in bowel habit and constipation.   Genitourinary:  Negative for bladder incontinence, dysuria, flank pain, genital sores and missed menses.   Neurological:  Negative for aphonia, brief paralysis, difficulty with concentration, dizziness and weakness.   Psychiatric/Behavioral:  Negative for altered mental status and memory loss. The patient does not have insomnia.    Allergic/Immunologic: Negative for environmental allergies.     Objective:   Physical Exam  Constitutional:       Appearance: He is well-developed.      Interventions: He is not intubated.  HENT:      Head: Normocephalic and atraumatic.      Right Ear: External ear normal.      Left Ear: External ear normal.   Eyes:      General: No scleral icterus.        Right eye: No discharge.         Left eye: No discharge.      Conjunctiva/sclera: Conjunctivae normal.      Pupils: Pupils are equal, round, and reactive to light.   Neck:      Thyroid: No thyromegaly.      Vascular: Normal carotid pulses. No  carotid bruit, hepatojugular reflux or JVD.      Trachea: No tracheal deviation.   Cardiovascular:      Rate and Rhythm: Normal rate and regular rhythm. No extrasystoles are present.     Chest Wall: PMI is not displaced.      Pulses:           Carotid pulses are 2+ on the right side and 2+ on the left side.       Radial pulses are 2+ on the right side and 2+ on the left side.        Femoral pulses are 2+ on the right side and 2+ on the left side.       Popliteal pulses are 2+ on the right side and 2+ on the left side.        Dorsalis pedis pulses are 1+ on the right side and 2+ on the left side.        Posterior tibial pulses are 1+ on the right side and 2+ on the left side.      Heart sounds: S1 normal and S2 normal. Heart sounds not distant. No midsystolic click. No murmur heard.    No friction rub. No gallop. No S3 sounds.      Comments:       Biphasic R DP and weak but biphasic R PT doppler signals       Biphasic L DP and PT doppler signals          Pulmonary:      Effort: Pulmonary effort is normal. No tachypnea, bradypnea, accessory muscle usage or respiratory distress. He is not intubated.      Breath sounds: Normal breath sounds. No stridor. No decreased breath sounds, wheezing or rales.   Chest:      Chest wall: No tenderness.   Abdominal:      General: There is no distension or abdominal bruit.      Palpations: There is no mass or pulsatile mass.      Tenderness: There is no abdominal tenderness. There is no guarding or rebound.   Musculoskeletal:         General: No tenderness. Normal range of motion.      Cervical back: Normal range of motion and neck supple.   Lymphadenopathy:      Cervical: No cervical adenopathy.   Skin:     General: Skin is warm.      Coloration: Skin is not pale.      Findings: No erythema or rash.   Neurological:      Mental Status: He is alert and oriented to person, place, and time.      Cranial Nerves: No cranial nerve deficit.      Coordination: Coordination normal.      Deep  Tendon Reflexes: Reflexes are normal and symmetric.   Psychiatric:         Behavior: Behavior normal.         Thought Content: Thought content normal.         Judgment: Judgment normal.         Assessment:     1. Coronary artery disease involving native coronary artery of native heart with angina pectoris with documented spasm    2. Atherosclerosis of native artery of both lower extremities with intermittent claudication    3. Mixed hyperlipidemia    4. Bilateral carotid artery stenosis    5. Claudication in peripheral vascular disease    6. CKD (chronic kidney disease) stage 4, GFR 15-29 ml/min    7. Abnormal cardiovascular stress test    8. Venous insufficiency of both lower extremities    9. Anemia due to stage 4 chronic kidney disease        Plan:             He had an abnormal stress test he will proceed with coronary angiography plus minus intervention.  Imdur 30 mg was added to his medications.       R radial access  REZA candidate          He was instructed to go to the emergency room he has any recurrent discomfort without resolution.  He expressed verbal understanding of the plan.  All his questions were answered to his satisfaction.    Medical therapy for CAD  DAPT with aspirin + plavix  Intense statin therapy  Arb  Pletal       Target LDL < 70  Low salt diet  Weight loss  Target BP < 130/80 mmHg          Continue with current medical plan and lifestyle changes.  Return sooner for concerns or questions. If symptoms persist go to the ED  I have reviewed all pertinent data on this patient           Follow up as scheduled. Return sooner for concerns or questions  He expressed verbal understanding and agreed with the plan        Greater than 50% of the visit of 45 minutes was spent counseling, educating, and coordinating the care of the patient.      -In today's visit, at least 4 established conditions that pose a risk to life or bodily function have been addressed and the conditions are severe.    -In today's  visit, monitoring for drug toxicity was accomplished.        Follow up as scheduled. Return sooner for concerns or questions  I have reviewed the patient's medical history in detail and updated the computerized patient record.    No orders of the defined types were placed in this encounter.    Patient's Medications   New Prescriptions    No medications on file   Previous Medications    ALBUTEROL (VENTOLIN HFA) 90 MCG/ACTUATION INHALER    Inhale 2 puffs into the lungs every 6 (six) hours as needed for Wheezing. Rescue    APIXABAN (ELIQUIS) 5 MG TAB    Take 1 tablet (5 mg total) by mouth 2 (two) times daily.    ASPIRIN (ASPIR-81 ORAL)    Take 81 mg by mouth once daily.    CARVEDILOL (COREG) 25 MG TABLET    Take 1 tablet (25 mg total) by mouth 2 (two) times daily with meals.    COENZYME Q10 100 MG CAPSULE    Take 100 mg by mouth once daily.    EPLERENONE (INSPRA) 50 MG TAB    Take 1 tablet (50 mg total) by mouth once daily.    FUROSEMIDE (LASIX) 40 MG TABLET    Take 1 tablet (40 mg total) by mouth once daily.    ISOSORBIDE MONONITRATE (IMDUR) 30 MG 24 HR TABLET    Take 1 tablet (30 mg total) by mouth once daily.    LEVOTHYROXINE (SYNTHROID) 25 MCG TABLET    Take one tablet PO every morning on an empty stomach and wait at least 1 hour before eating or taking other medications    NITROGLYCERIN (NITROSTAT) 0.4 MG SL TABLET    Place 1 tablet (0.4 mg total) under the tongue every 5 (five) minutes as needed for Chest pain.    ROSUVASTATIN (CRESTOR) 40 MG TAB    TAKE 1 TABLET(40 MG) BY MOUTH EVERY EVENING   Modified Medications    No medications on file   Discontinued Medications    No medications on file

## 2023-04-06 ENCOUNTER — PATIENT MESSAGE (OUTPATIENT)
Dept: INTERNAL MEDICINE | Facility: CLINIC | Age: 63
End: 2023-04-06
Payer: COMMERCIAL

## 2023-04-06 ENCOUNTER — LAB VISIT (OUTPATIENT)
Dept: LAB | Facility: HOSPITAL | Age: 63
End: 2023-04-06
Attending: INTERNAL MEDICINE
Payer: MEDICAID

## 2023-04-06 DIAGNOSIS — Z01.810 PRE-OPERATIVE CARDIOVASCULAR EXAMINATION: ICD-10-CM

## 2023-04-06 LAB
ANION GAP SERPL CALC-SCNC: 9 MMOL/L (ref 8–16)
BASOPHILS # BLD AUTO: 0.05 K/UL (ref 0–0.2)
BASOPHILS NFR BLD: 0.8 % (ref 0–1.9)
BUN SERPL-MCNC: 25 MG/DL (ref 8–23)
CALCIUM SERPL-MCNC: 8.3 MG/DL (ref 8.7–10.5)
CHLORIDE SERPL-SCNC: 108 MMOL/L (ref 95–110)
CO2 SERPL-SCNC: 26 MMOL/L (ref 23–29)
CREAT SERPL-MCNC: 1.9 MG/DL (ref 0.5–1.4)
DIFFERENTIAL METHOD: ABNORMAL
EOSINOPHIL # BLD AUTO: 0.3 K/UL (ref 0–0.5)
EOSINOPHIL NFR BLD: 5.2 % (ref 0–8)
ERYTHROCYTE [DISTWIDTH] IN BLOOD BY AUTOMATED COUNT: 13 % (ref 11.5–14.5)
EST. GFR  (NO RACE VARIABLE): 40 ML/MIN/1.73 M^2
GLUCOSE SERPL-MCNC: 58 MG/DL (ref 70–110)
HCT VFR BLD AUTO: 29.2 % (ref 40–54)
HGB BLD-MCNC: 9.8 G/DL (ref 14–18)
IMM GRANULOCYTES # BLD AUTO: 0.01 K/UL (ref 0–0.04)
IMM GRANULOCYTES NFR BLD AUTO: 0.2 % (ref 0–0.5)
LYMPHOCYTES # BLD AUTO: 1.6 K/UL (ref 1–4.8)
LYMPHOCYTES NFR BLD: 26.5 % (ref 18–48)
MCH RBC QN AUTO: 29.3 PG (ref 27–31)
MCHC RBC AUTO-ENTMCNC: 33.6 G/DL (ref 32–36)
MCV RBC AUTO: 87 FL (ref 82–98)
MONOCYTES # BLD AUTO: 0.7 K/UL (ref 0.3–1)
MONOCYTES NFR BLD: 10.8 % (ref 4–15)
NEUTROPHILS # BLD AUTO: 3.4 K/UL (ref 1.8–7.7)
NEUTROPHILS NFR BLD: 56.5 % (ref 38–73)
NRBC BLD-RTO: 0 /100 WBC
PLATELET # BLD AUTO: 181 K/UL (ref 150–450)
PMV BLD AUTO: 11.3 FL (ref 9.2–12.9)
POTASSIUM SERPL-SCNC: 3.2 MMOL/L (ref 3.5–5.1)
RBC # BLD AUTO: 3.34 M/UL (ref 4.6–6.2)
SODIUM SERPL-SCNC: 143 MMOL/L (ref 136–145)
WBC # BLD AUTO: 6 K/UL (ref 3.9–12.7)

## 2023-04-06 PROCEDURE — 85025 COMPLETE CBC W/AUTO DIFF WBC: CPT | Performed by: INTERNAL MEDICINE

## 2023-04-06 PROCEDURE — 80048 BASIC METABOLIC PNL TOTAL CA: CPT | Performed by: INTERNAL MEDICINE

## 2023-04-06 PROCEDURE — 36415 COLL VENOUS BLD VENIPUNCTURE: CPT | Performed by: INTERNAL MEDICINE

## 2023-04-07 ENCOUNTER — PATIENT MESSAGE (OUTPATIENT)
Dept: CARDIOLOGY | Facility: HOSPITAL | Age: 63
End: 2023-04-07
Payer: COMMERCIAL

## 2023-04-10 ENCOUNTER — HOSPITAL ENCOUNTER (OUTPATIENT)
Facility: HOSPITAL | Age: 63
Discharge: HOME | End: 2023-04-10
Attending: INTERNAL MEDICINE | Admitting: INTERNAL MEDICINE
Payer: COMMERCIAL

## 2023-04-10 VITALS
TEMPERATURE: 98 F | DIASTOLIC BLOOD PRESSURE: 85 MMHG | SYSTOLIC BLOOD PRESSURE: 174 MMHG | BODY MASS INDEX: 26.48 KG/M2 | HEIGHT: 70 IN | OXYGEN SATURATION: 99 % | WEIGHT: 185 LBS | HEART RATE: 83 BPM | RESPIRATION RATE: 20 BRPM

## 2023-04-10 DIAGNOSIS — R94.39 ABNORMAL CARDIOVASCULAR STRESS TEST: ICD-10-CM

## 2023-04-10 DIAGNOSIS — Z01.810 PRE-OPERATIVE CARDIOVASCULAR EXAMINATION: Primary | ICD-10-CM

## 2023-04-10 DIAGNOSIS — I25.10 CAD (CORONARY ARTERY DISEASE): ICD-10-CM

## 2023-04-10 PROCEDURE — 99152 PR MOD CONSCIOUS SEDATION, SAME PHYS, 5+ YRS, FIRST 15 MIN: ICD-10-PCS | Mod: ,,, | Performed by: INTERNAL MEDICINE

## 2023-04-10 PROCEDURE — C1769 GUIDE WIRE: HCPCS | Performed by: INTERNAL MEDICINE

## 2023-04-10 PROCEDURE — 25000003 PHARM REV CODE 250: Performed by: INTERNAL MEDICINE

## 2023-04-10 PROCEDURE — 99900035 HC TECH TIME PER 15 MIN (STAT)

## 2023-04-10 PROCEDURE — 93458 PR CATH PLACE/CORON ANGIO, IMG SUPER/INTERP,W LEFT HEART VENTRICULOGRAPHY: ICD-10-PCS | Mod: 26,,, | Performed by: INTERNAL MEDICINE

## 2023-04-10 PROCEDURE — 93005 ELECTROCARDIOGRAM TRACING: CPT

## 2023-04-10 PROCEDURE — 93010 ELECTROCARDIOGRAM REPORT: CPT | Mod: ,,, | Performed by: INTERNAL MEDICINE

## 2023-04-10 PROCEDURE — 63600175 PHARM REV CODE 636 W HCPCS: Performed by: INTERNAL MEDICINE

## 2023-04-10 PROCEDURE — C1887 CATHETER, GUIDING: HCPCS | Performed by: INTERNAL MEDICINE

## 2023-04-10 PROCEDURE — C1894 INTRO/SHEATH, NON-LASER: HCPCS | Performed by: INTERNAL MEDICINE

## 2023-04-10 PROCEDURE — 93458 L HRT ARTERY/VENTRICLE ANGIO: CPT | Mod: 26,,, | Performed by: INTERNAL MEDICINE

## 2023-04-10 PROCEDURE — 99152 MOD SED SAME PHYS/QHP 5/>YRS: CPT | Performed by: INTERNAL MEDICINE

## 2023-04-10 PROCEDURE — 93010 EKG 12-LEAD: ICD-10-PCS | Mod: ,,, | Performed by: INTERNAL MEDICINE

## 2023-04-10 PROCEDURE — 99152 MOD SED SAME PHYS/QHP 5/>YRS: CPT | Mod: ,,, | Performed by: INTERNAL MEDICINE

## 2023-04-10 PROCEDURE — 93458 L HRT ARTERY/VENTRICLE ANGIO: CPT | Performed by: INTERNAL MEDICINE

## 2023-04-10 RX ORDER — ACETAMINOPHEN 325 MG/1
650 TABLET ORAL EVERY 4 HOURS PRN
Status: DISCONTINUED | OUTPATIENT
Start: 2023-04-10 | End: 2023-04-10 | Stop reason: HOSPADM

## 2023-04-10 RX ORDER — CLOPIDOGREL BISULFATE 75 MG/1
300 TABLET ORAL ONCE
Status: COMPLETED | OUTPATIENT
Start: 2023-04-10 | End: 2023-04-10

## 2023-04-10 RX ORDER — SODIUM CHLORIDE 9 MG/ML
INJECTION, SOLUTION INTRAVENOUS CONTINUOUS
Status: ACTIVE | OUTPATIENT
Start: 2023-04-10 | End: 2023-04-10

## 2023-04-10 RX ORDER — LIDOCAINE HYDROCHLORIDE 10 MG/ML
INJECTION, SOLUTION EPIDURAL; INFILTRATION; INTRACAUDAL; PERINEURAL
Status: DISCONTINUED | OUTPATIENT
Start: 2023-04-10 | End: 2023-04-10 | Stop reason: HOSPADM

## 2023-04-10 RX ORDER — SODIUM CHLORIDE 9 MG/ML
INJECTION, SOLUTION INTRAVENOUS CONTINUOUS
Status: DISCONTINUED | OUTPATIENT
Start: 2023-04-10 | End: 2023-04-10 | Stop reason: HOSPADM

## 2023-04-10 RX ORDER — ISOSORBIDE MONONITRATE 60 MG/1
60 TABLET, EXTENDED RELEASE ORAL DAILY
Qty: 90 TABLET | Refills: 3 | Status: ON HOLD | OUTPATIENT
Start: 2023-04-10 | End: 2024-02-09 | Stop reason: HOSPADM

## 2023-04-10 RX ORDER — ONDANSETRON 4 MG/1
8 TABLET, ORALLY DISINTEGRATING ORAL EVERY 8 HOURS PRN
Status: DISCONTINUED | OUTPATIENT
Start: 2023-04-10 | End: 2023-04-10 | Stop reason: HOSPADM

## 2023-04-10 RX ORDER — ASPIRIN 81 MG/1
81 TABLET ORAL DAILY
Status: DISCONTINUED | OUTPATIENT
Start: 2023-04-10 | End: 2023-04-10

## 2023-04-10 RX ORDER — RANOLAZINE 500 MG/1
500 TABLET, EXTENDED RELEASE ORAL 2 TIMES DAILY
Qty: 180 TABLET | Refills: 3 | Status: SHIPPED | OUTPATIENT
Start: 2023-04-10 | End: 2024-03-01 | Stop reason: SDUPTHER

## 2023-04-10 RX ORDER — HEPARIN SODIUM 1000 [USP'U]/ML
INJECTION, SOLUTION INTRAVENOUS; SUBCUTANEOUS
Status: DISCONTINUED | OUTPATIENT
Start: 2023-04-10 | End: 2023-04-10 | Stop reason: HOSPADM

## 2023-04-10 RX ORDER — HEPARIN SODIUM 200 [USP'U]/100ML
INJECTION, SOLUTION INTRAVENOUS
Status: DISCONTINUED | OUTPATIENT
Start: 2023-04-10 | End: 2023-04-10 | Stop reason: HOSPADM

## 2023-04-10 RX ORDER — FENTANYL CITRATE 50 UG/ML
INJECTION, SOLUTION INTRAMUSCULAR; INTRAVENOUS
Status: DISCONTINUED | OUTPATIENT
Start: 2023-04-10 | End: 2023-04-10 | Stop reason: HOSPADM

## 2023-04-10 RX ORDER — VERAPAMIL HYDROCHLORIDE 2.5 MG/ML
INJECTION, SOLUTION INTRAVENOUS
Status: DISCONTINUED | OUTPATIENT
Start: 2023-04-10 | End: 2023-04-10 | Stop reason: HOSPADM

## 2023-04-10 RX ORDER — HYDRALAZINE HYDROCHLORIDE 20 MG/ML
10 INJECTION INTRAMUSCULAR; INTRAVENOUS ONCE
Status: COMPLETED | OUTPATIENT
Start: 2023-04-10 | End: 2023-04-10

## 2023-04-10 RX ORDER — MIDAZOLAM HYDROCHLORIDE 1 MG/ML
INJECTION, SOLUTION INTRAMUSCULAR; INTRAVENOUS
Status: DISCONTINUED | OUTPATIENT
Start: 2023-04-10 | End: 2023-04-10 | Stop reason: HOSPADM

## 2023-04-10 RX ADMIN — SODIUM CHLORIDE: 0.9 INJECTION, SOLUTION INTRAVENOUS at 11:04

## 2023-04-10 RX ADMIN — CLOPIDOGREL BISULFATE 300 MG: 75 TABLET ORAL at 12:04

## 2023-04-10 RX ADMIN — HYDRALAZINE HYDROCHLORIDE 10 MG: 20 INJECTION, SOLUTION INTRAMUSCULAR; INTRAVENOUS at 12:04

## 2023-04-10 NOTE — DISCHARGE SUMMARY
[facility] - Cath Lab (Hospital)  Discharge Summary      Admit Date: 4/10/2023    Discharge Date and Time:  04/10/2023 2:05 PM    Attending Physician: Keo Jenkins MD     Reason for Admission: Angina    Procedures Performed: Procedure(s) (LRB):  Left heart cath (Left)  ANGIOGRAM, CORONARY ARTERY (N/A)    Hospital Course (synopsis of major diagnoses, care, treatment, and services provided during the course of the hospital stay):     Successful LHC and coronary angiogram. Has moderate disease in the mid LAD. The previously stented LCX to OM is patent. The LCX has severe disease proximally. Has non obstructive disease of RCA with luminal irregularities of PDA. Will plan to stage him for PCI to LCX and iFR of LAD. In the meantime we will optimize therapy by increasing IMDUR to 60 mg QD and starting Ranexa 500 mg BID. We will put orders to for staged PCI as OP.     Goals of Care Treatment Preferences:  Code Status: Full Code    Final Diagnoses:    Principal Problem: Abnormal cardiovascular stress test   Secondary Diagnoses:   Active Hospital Problems    Diagnosis  POA    *Abnormal cardiovascular stress test [R94.39]  Yes         Nuclear stress test 2/2019     + ECG with 2 mm ST depression   No wall motion abnormalities   Test stopped at 6 minutes, 7 METs, 86% predicted heart rate   Stopped because of R leg claudication + ECG changes              Persistent atrial fibrillation [I48.19]  Yes    CKD (chronic kidney disease) stage 4, GFR 15-29 ml/min [N18.4]  Yes    Bilateral carotid artery stenosis [I65.23]  Yes    Coronary artery disease involving native coronary artery of native heart with angina pectoris with documented spasm [I25.111]  Yes         3/29/2019    S/p LHC via R radial           LM, LAD, and LCX are patent with luminal irregularities  RCA mid 95% calcified lesion  Normal EF with LVEDP 8 mmHg           PCI of mid LAD with 2.5 x 30 and 2.5 x 15 mm Resolute REZA  PCI of proximal RCA to treat guide dissection with  3.5 x 22 Resolute REZA        10/11/2019 for cardiac arrest        S/p staged LCX PCI                    L CFA access              IVUS guided PCI              3.0 x 15 mm Resolute REZA post dilated with 3.5 NC balloon             Resolved Hospital Problems   No resolved problems to display.       Discharged Condition: good    Disposition: Home or Self Care    Follow Up/Patient Instructions:     Medications:  Reconciled Home Medications:      Medication List        START taking these medications      ranolazine 500 MG Tb12  Commonly known as: RANEXA  Take 1 tablet (500 mg total) by mouth 2 (two) times daily.            CHANGE how you take these medications      isosorbide mononitrate 60 MG 24 hr tablet  Commonly known as: IMDUR  Take 1 tablet (60 mg total) by mouth once daily.  What changed:   medication strength  how much to take            CONTINUE taking these medications      albuterol 90 mcg/actuation inhaler  Commonly known as: VENTOLIN HFA  Inhale 2 puffs into the lungs every 6 (six) hours as needed for Wheezing. Rescue     apixaban 5 mg Tab  Commonly known as: ELIQUIS  Take 1 tablet (5 mg total) by mouth 2 (two) times daily.     ASPIR-81 ORAL  Take 81 mg by mouth once daily.     carvediloL 25 MG tablet  Commonly known as: COREG  Take 1 tablet (25 mg total) by mouth 2 (two) times daily with meals.     coenzyme Q10 100 mg capsule  Take 100 mg by mouth once daily.     eplerenone 50 MG Tab  Commonly known as: INSPRA  Take 1 tablet (50 mg total) by mouth once daily.     furosemide 40 MG tablet  Commonly known as: LASIX  Take 1 tablet (40 mg total) by mouth once daily.     levothyroxine 25 MCG tablet  Commonly known as: SYNTHROID  Take one tablet PO every morning on an empty stomach and wait at least 1 hour before eating or taking other medications     nitroGLYCERIN 0.4 MG SL tablet  Commonly known as: NITROSTAT  Place 1 tablet (0.4 mg total) under the tongue every 5 (five) minutes as needed for Chest pain.      rosuvastatin 40 MG Tab  Commonly known as: CRESTOR  TAKE 1 TABLET(40 MG) BY MOUTH EVERY EVENING            Discharge Procedure Orders   BASIC METABOLIC PANEL   Standing Status: Future Number of Occurrences: 1 Standing Exp. Date: 06/01/24     CBC W/ AUTO DIFFERENTIAL   Standing Status: Future Number of Occurrences: 1 Standing Exp. Date: 06/01/24

## 2023-04-10 NOTE — BRIEF OP NOTE
"    Post Cath Note  Referring Physician: Keo Jenkins MD  Procedure: Left heart cath (Left), ANGIOGRAM, CORONARY ARTERY (N/A)      : Keo Jenkins MD     Referring Physician: Keo Jenkins     All Operators: Surgeon(s):  MD Rodney Ochoa MD     Preoperative Diagnosis: Abnormal cardiovascular stress test [R94.39]     Postop Diagnosis: Abnormal cardiovascular stress test [R94.39]    Treatments/Procedures: Procedure(s) (LRB):  Left heart cath (Left)  ANGIOGRAM, CORONARY ARTERY (N/A)    Estimated Blood loss: <50 cc         Access: Right radial    LVEDP 15 mmHg    See full report for further details    Intervention:   Successful LHC and coronary angiogram. Has moderate disease in the mid LAD. The previously stented LCX to OM is patent. The LCX has severe disease proximally. Has non obstructive disease of RCA with luminal irregularities of PDA. Will plan to stage him for PCI to LCX and iFR of LAD.     Closure device: Radial band    Post Cath Exam:   BP (!) 193/91   Pulse 85   Temp 98.3 °F (36.8 °C) (Temporal)   Resp 17   Ht 5' 10" (1.778 m)   Wt 83.9 kg (185 lb)   SpO2 98%   BMI 26.54 kg/m²   No unusual pain, hematoma, thrill or bruit at vascular access site.  Distal pulse present without signs of ischemia.    Recommendations:   - Routine post-cath care  - IVF at 125 cc/hr for 2 hrs  - Continue medical management, Risk factor reduction, Follow-up with outpatient cardiologist  - Will titrate medical therapy (IMDUR to 60 mg)  - Will see him in clinic and discuss about revascularization options.     Rodney Phillip    "

## 2023-04-10 NOTE — Clinical Note
40 ml of contrast were injected throughout the case. 110 mL of contrast was the total wasted during the case. 150 mL was the total amount used during the case.

## 2023-04-10 NOTE — INTERVAL H&P NOTE
The patient has been examined and the H&P has been reviewed:    I concur with the findings and no changes have occurred since H&P was written.    Anesthesia/Surgery risks, benefits and alternative options discussed and understood by patient/family.     -LHC +/- PCI, patient is a REZA candidate  - Anti-platelet Therapy: ASA (held Eliquis)  - Access: RRA? Had access issues last time and converted access to RCFA  - Creatinine/CrCl:   CrCl cannot be calculated (Unknown ideal weight.).  - Allergies:   - No shellfish / Iodine allergy  - No Latex allergy   - No Aspirin allergy    - No history of HIT  - Pre-Hydration: NS 3cc/kg x 1 hour   - Pre-Op Med: Bendaryl 50mg pO     - All patient's questions were answered.  -The risks, benefits and alternatives of the procedure were explained to the patient.   -The risks of coronary angiography include but are not limited to: bleeding, infection, heart rhythm abnormalities, allergic reactions, kidney injury and potential need for dialysis, stroke and death.   - Should stenting be indicated, the patient has agreed to dual anti-platelet therapy for 1-consecutive year with a drug-eluting stent and a minimum of 1-month with the use of a bare metal stent  - Additionally, pt is aware that non-compliance is likely to result in stent clotting with heart attack, heart failure, and/or death  -The risks of moderate sedation include hypotension, respiratory depression, arrhythmias, bronchospasm, and death.   - Informed consent was obtained and the  patient is agreeable to proceed with the procedure.      There are no hospital problems to display for this patient.

## 2023-04-10 NOTE — DISCHARGE INSTRUCTIONS
Discharge Instructions:     Do not drive a car, operate heavy equipment, care for a young child, etc for the next 12-24 hours.   Avoid drinking alcohol for 24 hours.  Do not make any important decisions for 24 hours.     Drink fluids to keep hydrated. Resume your usual diet as tolerated.      Rest for today then activity as tolerated.   Do not lift anything over 5 pounds for the first 3 days after procedure.     Remove dressing tomorrow after 4 pm then may shower with warm soapy water. Do not scrub site. Pat dry.   May apply bandaid for 2 days.  No tub baths.  Do not submerge wound in water for 3 days.      Call MD for any unrelieved pain, excessive nausea or vomiting, redness around site, bleeding, or pus or foul smelling drainage, or any other questions or concerns.     Go to the ER for any difficulty breathing or chest pain.        If site swells or bleeds, hold direct pressure to area for 10 full minutes.   If site continues to bleed, continue to hold pressure to site and have someone bring you to the ER.       Follow any additional instructions given to you by MD.      Call MD to schedule a follow up appointment, or follow up as instructed.         Last Modified by Irena Brasher RN at 6/1/22 3432

## 2023-04-13 ENCOUNTER — PATIENT MESSAGE (OUTPATIENT)
Dept: CARDIOLOGY | Facility: CLINIC | Age: 63
End: 2023-04-13
Payer: COMMERCIAL

## 2023-04-19 DIAGNOSIS — I20.0 ANGINA PECTORIS, UNSTABLE: Primary | ICD-10-CM

## 2023-04-19 RX ORDER — SODIUM CHLORIDE 9 MG/ML
INJECTION, SOLUTION INTRAVENOUS CONTINUOUS
Status: CANCELLED | OUTPATIENT
Start: 2023-04-19 | End: 2023-04-19

## 2023-04-19 RX ORDER — DIPHENHYDRAMINE HCL 25 MG
50 CAPSULE ORAL ONCE
Status: CANCELLED | OUTPATIENT
Start: 2023-04-19 | End: 2023-04-19

## 2023-04-20 ENCOUNTER — LAB VISIT (OUTPATIENT)
Dept: LAB | Facility: HOSPITAL | Age: 63
End: 2023-04-20
Attending: INTERNAL MEDICINE
Payer: COMMERCIAL

## 2023-04-20 DIAGNOSIS — E03.9 HYPOTHYROIDISM, UNSPECIFIED TYPE: ICD-10-CM

## 2023-04-20 DIAGNOSIS — E87.6 HYPOKALEMIA: ICD-10-CM

## 2023-04-20 LAB
ANION GAP SERPL CALC-SCNC: 11 MMOL/L (ref 8–16)
BUN SERPL-MCNC: 18 MG/DL (ref 8–23)
CALCIUM SERPL-MCNC: 8.7 MG/DL (ref 8.7–10.5)
CHLORIDE SERPL-SCNC: 105 MMOL/L (ref 95–110)
CO2 SERPL-SCNC: 26 MMOL/L (ref 23–29)
CREAT SERPL-MCNC: 2.2 MG/DL (ref 0.5–1.4)
EST. GFR  (NO RACE VARIABLE): 33.2 ML/MIN/1.73 M^2
GLUCOSE SERPL-MCNC: 76 MG/DL (ref 70–110)
POTASSIUM SERPL-SCNC: 3.2 MMOL/L (ref 3.5–5.1)
SODIUM SERPL-SCNC: 142 MMOL/L (ref 136–145)
T4 FREE SERPL-MCNC: 0.85 NG/DL (ref 0.71–1.51)
TSH SERPL DL<=0.005 MIU/L-ACNC: 10.55 UIU/ML (ref 0.4–4)

## 2023-04-20 PROCEDURE — 84439 ASSAY OF FREE THYROXINE: CPT | Performed by: INTERNAL MEDICINE

## 2023-04-20 PROCEDURE — 84443 ASSAY THYROID STIM HORMONE: CPT | Performed by: INTERNAL MEDICINE

## 2023-04-20 PROCEDURE — 36415 COLL VENOUS BLD VENIPUNCTURE: CPT | Mod: PO | Performed by: INTERNAL MEDICINE

## 2023-04-20 PROCEDURE — 80048 BASIC METABOLIC PNL TOTAL CA: CPT | Performed by: INTERNAL MEDICINE

## 2023-04-21 ENCOUNTER — TELEPHONE (OUTPATIENT)
Dept: INTERNAL MEDICINE | Facility: CLINIC | Age: 63
End: 2023-04-21
Payer: COMMERCIAL

## 2023-04-21 DIAGNOSIS — E03.9 HYPOTHYROIDISM, UNSPECIFIED TYPE: Primary | ICD-10-CM

## 2023-04-21 DIAGNOSIS — E87.6 HYPOKALEMIA: ICD-10-CM

## 2023-04-21 RX ORDER — LEVOTHYROXINE SODIUM 50 UG/1
TABLET ORAL
Qty: 30 TABLET | Refills: 11 | Status: SHIPPED | OUTPATIENT
Start: 2023-04-21 | End: 2023-04-26 | Stop reason: SDUPTHER

## 2023-04-21 NOTE — TELEPHONE ENCOUNTER
Called patient to schedule lab appt, but there was no answer. Left message with the date and time of the appt

## 2023-04-21 NOTE — TELEPHONE ENCOUNTER
----- Message from Analy Encarnacion MD sent at 4/21/2023 11:35 AM CDT -----  Please call patient. His thyroid function has worsened despite starting thyroid medication. This means that he has missed doses or isn't taking it properly. Please find out which it is and let me know.   Potassium is also low. Please find out how much potassium he is taking and let me know. Thanks

## 2023-04-21 NOTE — TELEPHONE ENCOUNTER
Called patient to give result notes and to ask the questions that Dr. Encarnacion had for him. Patient claims to be taking thyroid meds correctly and not be missing doses. Patient also claimed that he was not taking any potassium and that he wouldn't be until after his surgery on Tuesday

## 2023-04-24 ENCOUNTER — LAB VISIT (OUTPATIENT)
Dept: LAB | Facility: HOSPITAL | Age: 63
End: 2023-04-24
Attending: INTERNAL MEDICINE
Payer: MEDICAID

## 2023-04-24 DIAGNOSIS — Z01.818 PREOP TESTING: ICD-10-CM

## 2023-04-24 LAB
ANION GAP SERPL CALC-SCNC: 5 MMOL/L (ref 8–16)
BASOPHILS # BLD AUTO: 0.05 K/UL (ref 0–0.2)
BASOPHILS NFR BLD: 0.7 % (ref 0–1.9)
BUN SERPL-MCNC: 20 MG/DL (ref 8–23)
CALCIUM SERPL-MCNC: 8.5 MG/DL (ref 8.7–10.5)
CHLORIDE SERPL-SCNC: 107 MMOL/L (ref 95–110)
CO2 SERPL-SCNC: 27 MMOL/L (ref 23–29)
CREAT SERPL-MCNC: 2.2 MG/DL (ref 0.5–1.4)
DIFFERENTIAL METHOD: ABNORMAL
EOSINOPHIL # BLD AUTO: 0.3 K/UL (ref 0–0.5)
EOSINOPHIL NFR BLD: 3.9 % (ref 0–8)
ERYTHROCYTE [DISTWIDTH] IN BLOOD BY AUTOMATED COUNT: 12.4 % (ref 11.5–14.5)
EST. GFR  (NO RACE VARIABLE): 33 ML/MIN/1.73 M^2
GLUCOSE SERPL-MCNC: 111 MG/DL (ref 70–110)
HCT VFR BLD AUTO: 30.9 % (ref 40–54)
HGB BLD-MCNC: 10.5 G/DL (ref 14–18)
IMM GRANULOCYTES # BLD AUTO: 0.02 K/UL (ref 0–0.04)
IMM GRANULOCYTES NFR BLD AUTO: 0.3 % (ref 0–0.5)
LYMPHOCYTES # BLD AUTO: 1.5 K/UL (ref 1–4.8)
LYMPHOCYTES NFR BLD: 21.4 % (ref 18–48)
MCH RBC QN AUTO: 29.1 PG (ref 27–31)
MCHC RBC AUTO-ENTMCNC: 34 G/DL (ref 32–36)
MCV RBC AUTO: 86 FL (ref 82–98)
MONOCYTES # BLD AUTO: 0.6 K/UL (ref 0.3–1)
MONOCYTES NFR BLD: 8.1 % (ref 4–15)
NEUTROPHILS # BLD AUTO: 4.7 K/UL (ref 1.8–7.7)
NEUTROPHILS NFR BLD: 65.6 % (ref 38–73)
NRBC BLD-RTO: 0 /100 WBC
PLATELET # BLD AUTO: 199 K/UL (ref 150–450)
PMV BLD AUTO: 11.3 FL (ref 9.2–12.9)
POTASSIUM SERPL-SCNC: 2.9 MMOL/L (ref 3.5–5.1)
RBC # BLD AUTO: 3.61 M/UL (ref 4.6–6.2)
SODIUM SERPL-SCNC: 139 MMOL/L (ref 136–145)
WBC # BLD AUTO: 7.15 K/UL (ref 3.9–12.7)

## 2023-04-24 PROCEDURE — 36415 COLL VENOUS BLD VENIPUNCTURE: CPT | Performed by: INTERNAL MEDICINE

## 2023-04-24 PROCEDURE — 80048 BASIC METABOLIC PNL TOTAL CA: CPT | Performed by: INTERNAL MEDICINE

## 2023-04-24 PROCEDURE — 85025 COMPLETE CBC W/AUTO DIFF WBC: CPT | Performed by: INTERNAL MEDICINE

## 2023-04-25 ENCOUNTER — HOSPITAL ENCOUNTER (OUTPATIENT)
Facility: HOSPITAL | Age: 63
Discharge: HOME | End: 2023-04-25
Attending: INTERNAL MEDICINE | Admitting: INTERNAL MEDICINE
Payer: MEDICAID

## 2023-04-25 VITALS
BODY MASS INDEX: 26.48 KG/M2 | WEIGHT: 185 LBS | DIASTOLIC BLOOD PRESSURE: 92 MMHG | HEART RATE: 99 BPM | TEMPERATURE: 97 F | RESPIRATION RATE: 16 BRPM | HEIGHT: 70 IN | OXYGEN SATURATION: 98 % | SYSTOLIC BLOOD PRESSURE: 154 MMHG

## 2023-04-25 DIAGNOSIS — Z01.818 PREOP TESTING: Primary | ICD-10-CM

## 2023-04-25 DIAGNOSIS — I25.10 CAD (CORONARY ARTERY DISEASE): ICD-10-CM

## 2023-04-25 DIAGNOSIS — I20.0 ANGINA PECTORIS, UNSTABLE: ICD-10-CM

## 2023-04-25 LAB
POC ACTIVATED CLOTTING TIME K: 203 SEC (ref 74–137)
POC ACTIVATED CLOTTING TIME K: 209 SEC (ref 74–137)
POC ACTIVATED CLOTTING TIME K: 215 SEC (ref 74–137)
POC ACTIVATED CLOTTING TIME K: 239 SEC (ref 74–137)
POC ACTIVATED CLOTTING TIME K: 251 SEC (ref 74–137)
SAMPLE: ABNORMAL

## 2023-04-25 PROCEDURE — 99152 PR MOD CONSCIOUS SEDATION, SAME PHYS, 5+ YRS, FIRST 15 MIN: ICD-10-PCS | Mod: ,,, | Performed by: INTERNAL MEDICINE

## 2023-04-25 PROCEDURE — C1894 INTRO/SHEATH, NON-LASER: HCPCS | Performed by: INTERNAL MEDICINE

## 2023-04-25 PROCEDURE — C1874 STENT, COATED/COV W/DEL SYS: HCPCS | Performed by: INTERNAL MEDICINE

## 2023-04-25 PROCEDURE — C1887 CATHETER, GUIDING: HCPCS | Performed by: INTERNAL MEDICINE

## 2023-04-25 PROCEDURE — 63600175 PHARM REV CODE 636 W HCPCS: Performed by: INTERNAL MEDICINE

## 2023-04-25 PROCEDURE — C1769 GUIDE WIRE: HCPCS | Performed by: INTERNAL MEDICINE

## 2023-04-25 PROCEDURE — C1753 CATH, INTRAVAS ULTRASOUND: HCPCS | Performed by: INTERNAL MEDICINE

## 2023-04-25 PROCEDURE — 93571 IV DOP VEL&/PRESS C FLO 1ST: CPT | Mod: 26,52,LD, | Performed by: INTERNAL MEDICINE

## 2023-04-25 PROCEDURE — 25000003 PHARM REV CODE 250: Performed by: INTERNAL MEDICINE

## 2023-04-25 PROCEDURE — 99152 MOD SED SAME PHYS/QHP 5/>YRS: CPT | Performed by: INTERNAL MEDICINE

## 2023-04-25 PROCEDURE — 93005 ELECTROCARDIOGRAM TRACING: CPT | Mod: 59

## 2023-04-25 PROCEDURE — 25500020 PHARM REV CODE 255: Performed by: INTERNAL MEDICINE

## 2023-04-25 PROCEDURE — 92933 PR STENT & ATHERECTOMY: ICD-10-PCS | Mod: LD,,, | Performed by: INTERNAL MEDICINE

## 2023-04-25 PROCEDURE — 93010 EKG 12-LEAD: ICD-10-PCS | Mod: ,,, | Performed by: INTERNAL MEDICINE

## 2023-04-25 PROCEDURE — 93799 UNLISTED CV SVC/PROCEDURE: CPT | Performed by: INTERNAL MEDICINE

## 2023-04-25 PROCEDURE — 93571 PR HEART FLOW RESERV MEASURE,INIT VESSL: ICD-10-PCS | Mod: 26,52,LD, | Performed by: INTERNAL MEDICINE

## 2023-04-25 PROCEDURE — C9602 PERC D-E COR STENT ATHER S: HCPCS | Mod: LD | Performed by: INTERNAL MEDICINE

## 2023-04-25 PROCEDURE — 85347 COAGULATION TIME ACTIVATED: CPT | Performed by: INTERNAL MEDICINE

## 2023-04-25 PROCEDURE — 93010 ELECTROCARDIOGRAM REPORT: CPT | Mod: ,,, | Performed by: INTERNAL MEDICINE

## 2023-04-25 PROCEDURE — 92978 ENDOLUMINL IVUS OCT C 1ST: CPT | Mod: 26,LD,, | Performed by: INTERNAL MEDICINE

## 2023-04-25 PROCEDURE — C1724 CATH, TRANS ATHEREC,ROTATION: HCPCS | Performed by: INTERNAL MEDICINE

## 2023-04-25 PROCEDURE — 99152 MOD SED SAME PHYS/QHP 5/>YRS: CPT | Mod: ,,, | Performed by: INTERNAL MEDICINE

## 2023-04-25 PROCEDURE — C1725 CATH, TRANSLUMIN NON-LASER: HCPCS | Performed by: INTERNAL MEDICINE

## 2023-04-25 PROCEDURE — 92978 ENDOLUMINL IVUS OCT C 1ST: CPT | Mod: LD | Performed by: INTERNAL MEDICINE

## 2023-04-25 PROCEDURE — 92978 PR IVUS, CORONARY, 1ST VESSEL: ICD-10-PCS | Mod: 26,LD,, | Performed by: INTERNAL MEDICINE

## 2023-04-25 PROCEDURE — 27201423 OPTIME MED/SURG SUP & DEVICES STERILE SUPPLY: Performed by: INTERNAL MEDICINE

## 2023-04-25 PROCEDURE — 99153 MOD SED SAME PHYS/QHP EA: CPT | Performed by: INTERNAL MEDICINE

## 2023-04-25 PROCEDURE — 92933 PRQ TRLML C ATHRC ST ANGIOP1: CPT | Mod: LD,,, | Performed by: INTERNAL MEDICINE

## 2023-04-25 DEVICE — EVEROLIMUS-ELUTING PLATINUM CHROMIUM CORONARY STENT SYSTEM
Type: IMPLANTABLE DEVICE | Site: CHEST | Status: FUNCTIONAL
Brand: SYNERGY™ XD

## 2023-04-25 RX ORDER — LIDOCAINE HYDROCHLORIDE 10 MG/ML
INJECTION, SOLUTION EPIDURAL; INFILTRATION; INTRACAUDAL; PERINEURAL
Status: DISCONTINUED | OUTPATIENT
Start: 2023-04-25 | End: 2023-04-25 | Stop reason: HOSPADM

## 2023-04-25 RX ORDER — FENTANYL CITRATE 50 UG/ML
INJECTION, SOLUTION INTRAMUSCULAR; INTRAVENOUS
Status: DISCONTINUED | OUTPATIENT
Start: 2023-04-25 | End: 2023-04-25 | Stop reason: HOSPADM

## 2023-04-25 RX ORDER — IODIXANOL 320 MG/ML
INJECTION, SOLUTION INTRAVASCULAR
Status: DISCONTINUED | OUTPATIENT
Start: 2023-04-25 | End: 2023-04-25 | Stop reason: HOSPADM

## 2023-04-25 RX ORDER — SODIUM CHLORIDE 9 MG/ML
INJECTION, SOLUTION INTRAVENOUS CONTINUOUS
Status: ACTIVE | OUTPATIENT
Start: 2023-04-25 | End: 2023-04-25

## 2023-04-25 RX ORDER — MIDAZOLAM HYDROCHLORIDE 1 MG/ML
INJECTION, SOLUTION INTRAMUSCULAR; INTRAVENOUS
Status: DISCONTINUED | OUTPATIENT
Start: 2023-04-25 | End: 2023-04-25 | Stop reason: HOSPADM

## 2023-04-25 RX ORDER — ONDANSETRON 4 MG/1
8 TABLET, ORALLY DISINTEGRATING ORAL EVERY 8 HOURS PRN
Status: DISCONTINUED | OUTPATIENT
Start: 2023-04-25 | End: 2023-04-25 | Stop reason: HOSPADM

## 2023-04-25 RX ORDER — DIPHENHYDRAMINE HCL 25 MG
50 CAPSULE ORAL ONCE
Status: DISCONTINUED | OUTPATIENT
Start: 2023-04-25 | End: 2023-04-25 | Stop reason: HOSPADM

## 2023-04-25 RX ORDER — CLOPIDOGREL BISULFATE 75 MG/1
75 TABLET ORAL DAILY
Qty: 90 TABLET | Refills: 3 | Status: SHIPPED | OUTPATIENT
Start: 2023-04-25 | End: 2024-03-01 | Stop reason: SDUPTHER

## 2023-04-25 RX ORDER — HEPARIN SODIUM 1000 [USP'U]/ML
INJECTION, SOLUTION INTRAVENOUS; SUBCUTANEOUS
Status: DISCONTINUED | OUTPATIENT
Start: 2023-04-25 | End: 2023-04-25 | Stop reason: HOSPADM

## 2023-04-25 RX ORDER — HEPARIN SODIUM 200 [USP'U]/100ML
INJECTION, SOLUTION INTRAVENOUS
Status: DISCONTINUED | OUTPATIENT
Start: 2023-04-25 | End: 2023-04-25 | Stop reason: HOSPADM

## 2023-04-25 RX ORDER — ACETAMINOPHEN 325 MG/1
650 TABLET ORAL EVERY 4 HOURS PRN
Status: DISCONTINUED | OUTPATIENT
Start: 2023-04-25 | End: 2023-04-25 | Stop reason: HOSPADM

## 2023-04-25 RX ORDER — VERAPAMIL HYDROCHLORIDE 2.5 MG/ML
INJECTION, SOLUTION INTRAVENOUS
Status: DISCONTINUED | OUTPATIENT
Start: 2023-04-25 | End: 2023-04-25 | Stop reason: HOSPADM

## 2023-04-25 NOTE — PLAN OF CARE
Patient discharged to home as ordered after 2 hour recovery per . All discharge instructions, printed materials given.    Patient instructed on follow-up appointment as ordered.  Patient verbalized understanding and agreement with all discharge instructions given. Pt is AAOx3, VSS, denies any pain. Right radial gauze and tegaderm dressing c.d.i. No bleeding or hematoma noted.  Skin normal in color and warm to touch. Palpated bilateral radial pulses and bilateral pedal pulses. Pt voided without difficulty. Pt ate 100% of his ordered lunch. No n/v. Pt got dressed and moving around without difficulty and no distress noted.Pt in w/c. Left hand 20 gauge iv dc'd as ordered with tip intact. aydin and koban dressing applied c.d.i.  Pt discharged home via wheelchair to private vehicle by pt's spouse.

## 2023-04-25 NOTE — PLAN OF CARE
Patient transferred to recovery cath lab slot 2 via stretcher with side rails up x2 .  Pt AAO X 4 and able to follow commands. Pt is stable when connecting to cardiac monitors.  VSS. Left radial vasc band in place with 14ml of air in band c.d.i. no bleeding or hematoma noted. +2 mag radial pulses palpated. +2 mag pedal pulses.Skin normal in color and warm to touch, <3 sec cap refill.  Fall risk precautions given and patient acknowledges.  AIDET completed to pt.  Will continue to monitor patient.

## 2023-04-25 NOTE — Clinical Note
The catheter was inserted into the ostium   left main. An angiography was performed of the left coronary arteries. Multiple views were taken. The angiography was performed via hand injection with 6 mL of contrast.

## 2023-04-25 NOTE — PLAN OF CARE
Remaining air removed from right radial vasc band. Gauze and tegaderm dressing applied c.d.i.   No drainage or shadowing noted. No bleeding or hematoma noted around site. +2 mag radial pulses palpated. Skin normal in color, warm to touch, < 3 sec cap refill.   Pt tolerated well.  Will continue to monitor pt.     -Support the baby's head and hold the baby close.

## 2023-04-25 NOTE — Clinical Note
75 ml of contrast were injected throughout the case. 75 mL of contrast was the total wasted during the case. 150 mL was the total amount used during the case.

## 2023-04-25 NOTE — INTERVAL H&P NOTE
The patient has been examined and the H&P has been reviewed:      He is here for LAD iFR guided PCI      Risks, benefits and alternatives of cardiac catheterization and possible intervention were discussed with the patient. All questions were answered and informed consent obtained.     I discussed the importance of compliance with dual antiplatelet therapy with the patient to prevent acute or late stent thrombosis with premature discontinuation of the therapy.      Active Hospital Problems    Diagnosis  POA    Unstable angina [I20.0]  Yes     I have messaged his cardiologist and electrophysiologist about this. Currently awaiting response           Resolved Hospital Problems   No resolved problems to display.

## 2023-04-25 NOTE — BRIEF OP NOTE
S/p staged LAD PCI       Right radial access        LM patent   LAD 80% prox /mid stenosis   Patent D1,D2, D3, and septals   Patent ostial LCX   Patent LCX/OM stent   Stent marcial LCX 95% stenosis   RCA-not studied         Intervention        IVUS guided PCI   Non-dilatable LAD lesion requiring atherectomy    Atherectomy of proxmial/mid LAD with CSI    Vessel preparation with 2.5 and 3.0 mm balloon   PCI with 3.0 x 38 mm Synergy REZA post dilated with 3.5 NC balloon          He tolerated procedure well           Plan:       DAPT with aspirin and plavix    Return for staged LCX PCI   3.5 EBU guide stent LCX across OM stent   Culotte kissing stent at the end

## 2023-04-25 NOTE — DISCHARGE SUMMARY
[facility] - Cath Lab (Hospital)  Discharge Note  Short Stay    Procedure(s) (LRB):  Left heart cath (Left)  Instantaneous Wave-Free Ratio (IFR) (N/A)  Atherectomy-coronary (N/A)  Angioplasty-coronary  Stent, Drug Eluting, Single Vessel, Coronary  IVUS, Coronary      OUTCOME: Patient tolerated treatment/procedure well without complication and is now ready for discharge.    DISPOSITION: Home or Self Care    FINAL DIAGNOSIS:  <principal problem not specified>    FOLLOWUP: In clinic    DISCHARGE INSTRUCTIONS:    Discharge Procedure Orders   CBC W/ AUTO DIFFERENTIAL   Standing Status: Future Number of Occurrences: 1 Standing Exp. Date: 06/18/24   Order Comments: preop     BASIC METABOLIC PANEL   Standing Status: Future Number of Occurrences: 1 Standing Exp. Date: 06/18/24   Order Comments: preop          S/p staged LAD PCI       Right radial access        LM patent   LAD 80% prox /mid stenosis   Patent D1,D2, D3, and septals   Patent ostial LCX   Patent LCX/OM stent   Stent jaild LCX 95% stenosis   RCA-not studied         Intervention        IVUS guided PCI   Non-dilatable LAD lesion requiring atherectomy    Atherectomy of proxmial/mid LAD with CSI    Vessel preparation with 2.5 and 3.0 mm balloon   PCI with 3.0 x 38 mm Synergy REZA post dilated with 3.5 NC balloon          He tolerated procedure well           Plan:       DAPT with aspirin and plavix    Return for staged LCX PCI   3.5 EBU guide stent LCX across OM stent   Culotte kissing stent at the end            Current Discharge Medication List        START taking these medications    Details   clopidogreL (PLAVIX) 75 mg tablet Take 1 tablet (75 mg total) by mouth once daily.  Qty: 90 tablet, Refills: 3           CONTINUE these medications which have NOT CHANGED    Details   apixaban (ELIQUIS) 5 mg Tab Take 1 tablet (5 mg total) by mouth 2 (two) times daily.  Qty: 60 tablet, Refills: 11    Associated Diagnoses: Paroxysmal atrial fibrillation      ASPIRIN (ASPIR-81 ORAL)  Take 81 mg by mouth once daily.      carvediloL (COREG) 25 MG tablet Take 1 tablet (25 mg total) by mouth 2 (two) times daily with meals.  Qty: 60 tablet, Refills: 11    Comments: .      coenzyme Q10 100 mg capsule Take 100 mg by mouth once daily.      eplerenone (INSPRA) 50 MG Tab Take 1 tablet (50 mg total) by mouth once daily.  Qty: 90 tablet, Refills: 3    Comments: Replacement for spironolactone. Thanks!  Associated Diagnoses: Essential hypertension      isosorbide mononitrate (IMDUR) 60 MG 24 hr tablet Take 1 tablet (60 mg total) by mouth once daily.  Qty: 90 tablet, Refills: 3      levothyroxine (SYNTHROID) 50 MCG tablet Take one tablet PO every morning on an empty stomach and wait at least 1 hour before eating or taking other medications  Qty: 30 tablet, Refills: 11    Associated Diagnoses: Hypothyroidism, unspecified type      ranolazine (RANEXA) 500 MG Tb12 Take 1 tablet (500 mg total) by mouth 2 (two) times daily.  Qty: 180 tablet, Refills: 3      rosuvastatin (CRESTOR) 40 MG Tab TAKE 1 TABLET(40 MG) BY MOUTH EVERY EVENING  Qty: 90 tablet, Refills: 3    Associated Diagnoses: Mixed hyperlipidemia      albuterol (VENTOLIN HFA) 90 mcg/actuation inhaler Inhale 2 puffs into the lungs every 6 (six) hours as needed for Wheezing. Rescue  Qty: 18 g, Refills: 0      nitroGLYCERIN (NITROSTAT) 0.4 MG SL tablet Place 1 tablet (0.4 mg total) under the tongue every 5 (five) minutes as needed for Chest pain.  Qty: 20 tablet, Refills: 12           STOP taking these medications       furosemide (LASIX) 40 MG tablet Comments:   Reason for Stopping:                  TIME SPENT ON DISCHARGE: 35 minutes

## 2023-04-26 ENCOUNTER — PATIENT MESSAGE (OUTPATIENT)
Dept: INTERNAL MEDICINE | Facility: CLINIC | Age: 63
End: 2023-04-26
Payer: COMMERCIAL

## 2023-04-26 ENCOUNTER — TELEPHONE (OUTPATIENT)
Dept: INTERNAL MEDICINE | Facility: CLINIC | Age: 63
End: 2023-04-26
Payer: COMMERCIAL

## 2023-04-26 DIAGNOSIS — E03.9 HYPOTHYROIDISM, UNSPECIFIED TYPE: ICD-10-CM

## 2023-04-26 RX ORDER — LEVOTHYROXINE SODIUM 50 UG/1
TABLET ORAL
Qty: 30 TABLET | Refills: 11 | Status: ON HOLD | OUTPATIENT
Start: 2023-04-26 | End: 2024-02-09 | Stop reason: HOSPADM

## 2023-04-26 NOTE — TELEPHONE ENCOUNTER
Called patient to let them know that the pharmacy will have his medicine after 5 today. Patient verbalized understanding

## 2023-05-11 ENCOUNTER — PATIENT MESSAGE (OUTPATIENT)
Dept: CARDIOLOGY | Facility: HOSPITAL | Age: 63
End: 2023-05-11
Payer: MEDICAID

## 2023-05-12 ENCOUNTER — PATIENT MESSAGE (OUTPATIENT)
Dept: INTERNAL MEDICINE | Facility: CLINIC | Age: 63
End: 2023-05-12
Payer: MEDICAID

## 2023-05-12 DIAGNOSIS — I20.0 ANGINA PECTORIS, UNSTABLE: Primary | ICD-10-CM

## 2023-05-12 RX ORDER — DIPHENHYDRAMINE HCL 50 MG
50 CAPSULE ORAL ONCE
Status: CANCELLED | OUTPATIENT
Start: 2023-05-12 | End: 2023-05-12

## 2023-05-12 RX ORDER — SODIUM CHLORIDE 9 MG/ML
INJECTION, SOLUTION INTRAVENOUS CONTINUOUS
Status: CANCELLED | OUTPATIENT
Start: 2023-05-12 | End: 2023-05-12

## 2023-05-13 ENCOUNTER — PATIENT MESSAGE (OUTPATIENT)
Dept: ADMINISTRATIVE | Facility: OTHER | Age: 63
End: 2023-05-13
Payer: MEDICAID

## 2023-05-15 ENCOUNTER — LAB VISIT (OUTPATIENT)
Dept: LAB | Facility: HOSPITAL | Age: 63
End: 2023-05-15
Attending: INTERNAL MEDICINE
Payer: COMMERCIAL

## 2023-05-15 DIAGNOSIS — Z01.818 PREOP TESTING: ICD-10-CM

## 2023-05-15 LAB
ANION GAP SERPL CALC-SCNC: 8 MMOL/L (ref 8–16)
BASOPHILS # BLD AUTO: 0.05 K/UL (ref 0–0.2)
BASOPHILS NFR BLD: 0.7 % (ref 0–1.9)
BUN SERPL-MCNC: 22 MG/DL (ref 8–23)
CALCIUM SERPL-MCNC: 9 MG/DL (ref 8.7–10.5)
CHLORIDE SERPL-SCNC: 108 MMOL/L (ref 95–110)
CO2 SERPL-SCNC: 24 MMOL/L (ref 23–29)
CREAT SERPL-MCNC: 2.4 MG/DL (ref 0.5–1.4)
DIFFERENTIAL METHOD: ABNORMAL
EOSINOPHIL # BLD AUTO: 0.3 K/UL (ref 0–0.5)
EOSINOPHIL NFR BLD: 4.9 % (ref 0–8)
ERYTHROCYTE [DISTWIDTH] IN BLOOD BY AUTOMATED COUNT: 12.3 % (ref 11.5–14.5)
EST. GFR  (NO RACE VARIABLE): 30 ML/MIN/1.73 M^2
GLUCOSE SERPL-MCNC: 87 MG/DL (ref 70–110)
HCT VFR BLD AUTO: 32.2 % (ref 40–54)
HGB BLD-MCNC: 10.8 G/DL (ref 14–18)
IMM GRANULOCYTES # BLD AUTO: 0.02 K/UL (ref 0–0.04)
IMM GRANULOCYTES NFR BLD AUTO: 0.3 % (ref 0–0.5)
LYMPHOCYTES # BLD AUTO: 1.5 K/UL (ref 1–4.8)
LYMPHOCYTES NFR BLD: 21.8 % (ref 18–48)
MCH RBC QN AUTO: 29.4 PG (ref 27–31)
MCHC RBC AUTO-ENTMCNC: 33.5 G/DL (ref 32–36)
MCV RBC AUTO: 88 FL (ref 82–98)
MONOCYTES # BLD AUTO: 0.6 K/UL (ref 0.3–1)
MONOCYTES NFR BLD: 9 % (ref 4–15)
NEUTROPHILS # BLD AUTO: 4.4 K/UL (ref 1.8–7.7)
NEUTROPHILS NFR BLD: 63.3 % (ref 38–73)
NRBC BLD-RTO: 0 /100 WBC
PLATELET # BLD AUTO: 173 K/UL (ref 150–450)
PMV BLD AUTO: 11.2 FL (ref 9.2–12.9)
POTASSIUM SERPL-SCNC: 4 MMOL/L (ref 3.5–5.1)
RBC # BLD AUTO: 3.67 M/UL (ref 4.6–6.2)
SODIUM SERPL-SCNC: 140 MMOL/L (ref 136–145)
WBC # BLD AUTO: 6.88 K/UL (ref 3.9–12.7)

## 2023-05-15 PROCEDURE — 80048 BASIC METABOLIC PNL TOTAL CA: CPT | Performed by: INTERNAL MEDICINE

## 2023-05-15 PROCEDURE — 85025 COMPLETE CBC W/AUTO DIFF WBC: CPT | Performed by: INTERNAL MEDICINE

## 2023-05-15 PROCEDURE — 36415 COLL VENOUS BLD VENIPUNCTURE: CPT | Performed by: INTERNAL MEDICINE

## 2023-05-16 ENCOUNTER — HOSPITAL ENCOUNTER (OUTPATIENT)
Facility: HOSPITAL | Age: 63
Discharge: HOME | End: 2023-05-16
Attending: INTERNAL MEDICINE | Admitting: INTERNAL MEDICINE
Payer: COMMERCIAL

## 2023-05-16 DIAGNOSIS — I20.0 ANGINA PECTORIS, UNSTABLE: ICD-10-CM

## 2023-05-16 DIAGNOSIS — I25.111 CORONARY ARTERY DISEASE INVOLVING NATIVE CORONARY ARTERY OF NATIVE HEART WITH ANGINA PECTORIS WITH DOCUMENTED SPASM: Primary | ICD-10-CM

## 2023-05-16 DIAGNOSIS — I25.10 CAD (CORONARY ARTERY DISEASE): ICD-10-CM

## 2023-05-16 LAB
POC ACTIVATED CLOTTING TIME K: 221 SEC (ref 74–137)
POC ACTIVATED CLOTTING TIME K: 227 SEC (ref 74–137)
POC ACTIVATED CLOTTING TIME K: 227 SEC (ref 74–137)
POC ACTIVATED CLOTTING TIME K: 257 SEC (ref 74–137)
SAMPLE: ABNORMAL

## 2023-05-16 PROCEDURE — C1874 STENT, COATED/COV W/DEL SYS: HCPCS | Performed by: INTERNAL MEDICINE

## 2023-05-16 PROCEDURE — 27201423 OPTIME MED/SURG SUP & DEVICES STERILE SUPPLY: Performed by: INTERNAL MEDICINE

## 2023-05-16 PROCEDURE — 93010 ELECTROCARDIOGRAM REPORT: CPT | Mod: ,,, | Performed by: INTERNAL MEDICINE

## 2023-05-16 PROCEDURE — 99152 PR MOD CONSCIOUS SEDATION, SAME PHYS, 5+ YRS, FIRST 15 MIN: ICD-10-PCS | Mod: ,,, | Performed by: INTERNAL MEDICINE

## 2023-05-16 PROCEDURE — 92979 PR INTRAVASC CORONARY SO2,ADDN VESSEL: ICD-10-PCS | Mod: 26,LC,, | Performed by: INTERNAL MEDICINE

## 2023-05-16 PROCEDURE — 25000003 PHARM REV CODE 250: Performed by: INTERNAL MEDICINE

## 2023-05-16 PROCEDURE — 92928 PRQ TCAT PLMT NTRAC ST 1 LES: CPT | Mod: LC,,, | Performed by: INTERNAL MEDICINE

## 2023-05-16 PROCEDURE — C1753 CATH, INTRAVAS ULTRASOUND: HCPCS | Performed by: INTERNAL MEDICINE

## 2023-05-16 PROCEDURE — 92978 ENDOLUMINL IVUS OCT C 1ST: CPT | Mod: 26,LC,, | Performed by: INTERNAL MEDICINE

## 2023-05-16 PROCEDURE — 85347 COAGULATION TIME ACTIVATED: CPT | Performed by: INTERNAL MEDICINE

## 2023-05-16 PROCEDURE — C1894 INTRO/SHEATH, NON-LASER: HCPCS | Performed by: INTERNAL MEDICINE

## 2023-05-16 PROCEDURE — 99152 MOD SED SAME PHYS/QHP 5/>YRS: CPT | Mod: ,,, | Performed by: INTERNAL MEDICINE

## 2023-05-16 PROCEDURE — 93454 PR CATH PLACE/CORONARY ANGIO, IMG SUPER/INTERP: ICD-10-PCS | Mod: 26,59,, | Performed by: INTERNAL MEDICINE

## 2023-05-16 PROCEDURE — 25500020 PHARM REV CODE 255: Performed by: INTERNAL MEDICINE

## 2023-05-16 PROCEDURE — 99152 MOD SED SAME PHYS/QHP 5/>YRS: CPT | Performed by: INTERNAL MEDICINE

## 2023-05-16 PROCEDURE — 92928 PR STENT: ICD-10-PCS | Mod: LC,,, | Performed by: INTERNAL MEDICINE

## 2023-05-16 PROCEDURE — 93454 CORONARY ARTERY ANGIO S&I: CPT | Mod: 26,59,, | Performed by: INTERNAL MEDICINE

## 2023-05-16 PROCEDURE — 63600175 PHARM REV CODE 636 W HCPCS: Performed by: INTERNAL MEDICINE

## 2023-05-16 PROCEDURE — 99153 MOD SED SAME PHYS/QHP EA: CPT | Performed by: INTERNAL MEDICINE

## 2023-05-16 PROCEDURE — C1725 CATH, TRANSLUMIN NON-LASER: HCPCS | Performed by: INTERNAL MEDICINE

## 2023-05-16 PROCEDURE — 99900035 HC TECH TIME PER 15 MIN (STAT)

## 2023-05-16 PROCEDURE — 92979 ENDOLUMINL IVUS OCT C EA: CPT | Mod: LC | Performed by: INTERNAL MEDICINE

## 2023-05-16 PROCEDURE — C1887 CATHETER, GUIDING: HCPCS | Performed by: INTERNAL MEDICINE

## 2023-05-16 PROCEDURE — 92979 ENDOLUMINL IVUS OCT C EA: CPT | Mod: 26,LC,, | Performed by: INTERNAL MEDICINE

## 2023-05-16 PROCEDURE — 93005 ELECTROCARDIOGRAM TRACING: CPT | Mod: 59

## 2023-05-16 PROCEDURE — 92978 PR IVUS, CORONARY, 1ST VESSEL: ICD-10-PCS | Mod: 26,LC,, | Performed by: INTERNAL MEDICINE

## 2023-05-16 PROCEDURE — C1769 GUIDE WIRE: HCPCS | Performed by: INTERNAL MEDICINE

## 2023-05-16 PROCEDURE — C9600 PERC DRUG-EL COR STENT SING: HCPCS | Mod: LC | Performed by: INTERNAL MEDICINE

## 2023-05-16 PROCEDURE — 93010 EKG 12-LEAD: ICD-10-PCS | Mod: ,,, | Performed by: INTERNAL MEDICINE

## 2023-05-16 PROCEDURE — 92978 ENDOLUMINL IVUS OCT C 1ST: CPT | Mod: LC | Performed by: INTERNAL MEDICINE

## 2023-05-16 PROCEDURE — 93454 CORONARY ARTERY ANGIO S&I: CPT | Mod: 59 | Performed by: INTERNAL MEDICINE

## 2023-05-16 DEVICE — EVEROLIMUS-ELUTING PLATINUM CHROMIUM CORONARY STENT SYSTEM
Type: IMPLANTABLE DEVICE | Site: CORONARY | Status: FUNCTIONAL
Brand: SYNERGY™ XD

## 2023-05-16 RX ORDER — FENTANYL CITRATE 50 UG/ML
INJECTION, SOLUTION INTRAMUSCULAR; INTRAVENOUS
Status: DISCONTINUED | OUTPATIENT
Start: 2023-05-16 | End: 2023-05-16 | Stop reason: HOSPADM

## 2023-05-16 RX ORDER — IODIXANOL 320 MG/ML
INJECTION, SOLUTION INTRAVASCULAR
Status: DISCONTINUED | OUTPATIENT
Start: 2023-05-16 | End: 2023-05-16 | Stop reason: HOSPADM

## 2023-05-16 RX ORDER — LIDOCAINE HYDROCHLORIDE 10 MG/ML
INJECTION, SOLUTION EPIDURAL; INFILTRATION; INTRACAUDAL; PERINEURAL
Status: DISCONTINUED | OUTPATIENT
Start: 2023-05-16 | End: 2023-05-16 | Stop reason: HOSPADM

## 2023-05-16 RX ORDER — HEPARIN SODIUM 1000 [USP'U]/ML
INJECTION, SOLUTION INTRAVENOUS; SUBCUTANEOUS
Status: DISCONTINUED | OUTPATIENT
Start: 2023-05-16 | End: 2023-05-16 | Stop reason: HOSPADM

## 2023-05-16 RX ORDER — ONDANSETRON 4 MG/1
8 TABLET, ORALLY DISINTEGRATING ORAL EVERY 8 HOURS PRN
Status: DISCONTINUED | OUTPATIENT
Start: 2023-05-16 | End: 2023-05-16 | Stop reason: HOSPADM

## 2023-05-16 RX ORDER — VERAPAMIL HYDROCHLORIDE 2.5 MG/ML
INJECTION, SOLUTION INTRAVENOUS
Status: DISCONTINUED | OUTPATIENT
Start: 2023-05-16 | End: 2023-05-16 | Stop reason: HOSPADM

## 2023-05-16 RX ORDER — ACETAMINOPHEN 325 MG/1
650 TABLET ORAL EVERY 4 HOURS PRN
Status: DISCONTINUED | OUTPATIENT
Start: 2023-05-16 | End: 2023-05-16 | Stop reason: HOSPADM

## 2023-05-16 RX ORDER — SODIUM CHLORIDE 9 MG/ML
INJECTION, SOLUTION INTRAVENOUS CONTINUOUS
Status: DISCONTINUED | OUTPATIENT
Start: 2023-05-16 | End: 2023-05-16 | Stop reason: HOSPADM

## 2023-05-16 RX ORDER — MIDAZOLAM HYDROCHLORIDE 1 MG/ML
INJECTION, SOLUTION INTRAMUSCULAR; INTRAVENOUS
Status: DISCONTINUED | OUTPATIENT
Start: 2023-05-16 | End: 2023-05-16 | Stop reason: HOSPADM

## 2023-05-16 RX ORDER — HEPARIN SODIUM 200 [USP'U]/100ML
INJECTION, SOLUTION INTRAVENOUS
Status: DISCONTINUED | OUTPATIENT
Start: 2023-05-16 | End: 2023-05-16 | Stop reason: HOSPADM

## 2023-05-16 RX ORDER — SODIUM CHLORIDE 9 MG/ML
INJECTION, SOLUTION INTRAVENOUS CONTINUOUS
Status: ACTIVE | OUTPATIENT
Start: 2023-05-16 | End: 2023-05-16

## 2023-05-16 RX ADMIN — SODIUM CHLORIDE 100 ML/HR: 0.9 INJECTION, SOLUTION INTRAVENOUS at 12:05

## 2023-05-16 NOTE — BRIEF OP NOTE
Brief Operative Note:    : Keo Jenkins MD     Referring Physician: Keo Jenkins     All Operators: Surgeon(s):  MD Sawyer Ochoa MD     Preoperative Diagnosis: Angina pectoris, unstable [I20.0]     Postop Diagnosis: Angina pectoris, unstable [I20.0]    Treatments/Procedures: Procedure(s) (LRB):  Left heart cath (Left)  IVUS, Coronary  PTCA, Single Vessel  Stent, Drug Eluting, Single Vessel, Coronary    Findings:  -s/p LCX PCI with a 3.0X20MM Synergy stent post dialted with a 3.8 x12  mm balloon  See catheterization report for full details.    Estimated Blood loss: 20 cc    Specimens removed: No    Signed:  Sawyer Vigil MD  Cardiovascular Fellow  [facility]

## 2023-05-16 NOTE — DISCHARGE INSTRUCTIONS
Discharge Instructions:     Do not drive a car, operate heavy equipment, care for a young child, etc for the next 12-24 hours.   Avoid drinking alcohol for 24 hours.  Do not make any important decisions for 24 hours.     Drink fluids to keep hydrated. Resume your usual diet as tolerated.      Rest for today then activity as tolerated.   Do not lift anything over 5 pounds for the first 3 days after procedure.     Remove dressing tomorrow after 4 pm then you may shower with warm soapy water. Do not scrub site. Pat dry.   May apply bandaid for 2 days.  No tub baths.  Do not submerge wound in water for 3 days.      Call MD for any unrelieved pain, excessive nausea or vomiting, redness around site, bleeding, or pus or foul smelling drainage, or any other questions or concerns.     Go to the ER for any difficulty breathing or chest pain.        If site swells or bleeds, hold direct pressure to area for 10 full minutes.   If site continues to bleed, continue to hold pressure to site and have someone bring you to the ER.       Follow any additional instructions given to you by MD.      Call MD to schedule a follow up appointment, or follow up as instructed.         Last Modified by Irena Brasher RN at 6/1/22 2692

## 2023-05-16 NOTE — Clinical Note
The catheter was repositioned into the ostium   left main. An angiography was performed of the left coronary arteries. Multiple views were taken. The angiography was performed via hand injection with 20 mL of contrast.

## 2023-05-16 NOTE — PLAN OF CARE
2 cc of air removed from R radial vasc band. No hematoma or bleeding noted. +2 mag radial pulses palpated. Skin is normal in color, warm to touch, < 3 sec cap refill. VSS. Will continue to monitor pt for safety and needs.

## 2023-05-16 NOTE — PLAN OF CARE
Remaining air removed from R radial vasc band. Gauze and tegaderm dressing applied. Site is CDI. No bleeding or hematoma noted around site. Site and surrounding areas soft. +2 mag radial pulses palpated. Skin normal in color, warm to touch, < 3 sec cap refill. Will continue to monitor pt.

## 2023-05-16 NOTE — PLAN OF CARE
Patient discharged to home as ordered after 2 hour recovery per Dr. Jenkins. Pt is AAOx4, VSS, denies any pain.  All discharge instructions and printed materials reviewed and given to patient.   Patient instructed on follow-up appointment as ordered.     Patient verbalized understanding and agreement with all discharge instructions given.     Right radial gauze and tegaderm dressing c.d.i. No bleeding or hematoma noted around site. Site and surrounding area is soft.    Palpated +2 mag radial pulses.Palpated mag pedal pulses.     Pt voided without difficulty prior to discharge Pt ate lunch tray. No n/v.   Pt got dressed and moving around without difficulty and no distress noted.  Pt in w/c. Left arm 20 gauge iv dc'd as ordered with tip intact. Gauze and koban dressing applied c.d.i.  Pt discharged home via wheelchair to cab with wife at his side.

## 2023-05-16 NOTE — PLAN OF CARE
Patient transfered to cath lab bay # 2 via stretcher with side rails up x2. Pt AAOx4 and able to follow commands. Pt is stable when connecting to cardiac monitors. VSS.   Right radial vasc band in place [id number] of air in band. Site is CDI. No redness, bruising, or hematoma noted around site. +2 mag radial pulses palpated. Palpated amg pedal pulses. Skin normal in color and warm to touch, <3 sec cap refill.  Fall risk precautions given and patient acknowledges.  AIDET completed to pt. Updated pt's wife in sx waiting room. Will continue to monitor patient.

## 2023-05-16 NOTE — DISCHARGE SUMMARY
Discharge Summary  Cardiology      Admit Date: 5/16/2023    Discharge Date :5/16/2023    Attending Physician: Dr Jenkins    Discharge Physician: Sawyer Vigil    Discharged Condition: good    Discharge Diagnosis: Angina pectoris, unstable [I20.0]    Treatments/Procedures: Procedure(s) (LRB):  Left heart cath (Left)  IVUS, Coronary  PTCA, Single Vessel  Stent, Drug Eluting, Single Vessel, Coronary    Hospital Course:   Domingo Gross is a 62 y.o. male who presents for follow up of Coronary Artery Disease, Hypertension, Hyperlipidemia, and Atrial Fibrillation who presents for staged  Lcx PCI. Pt is s/p LCX PCI with a 3.0X20MM Synergy stent post dialted with a 3.8 x12  mm balloon.    Significant Diagnostic Studies: none    Disposition: Home or Self Care    Diet: Regular    Follow up: Office 4 weeks    Activity: No heavy lifting till seen in office.    Patient Instructions:   Current Discharge Medication List        CONTINUE these medications which have NOT CHANGED    Details   ASPIRIN (ASPIR-81 ORAL) Take 81 mg by mouth once daily.      carvediloL (COREG) 25 MG tablet Take 1 tablet (25 mg total) by mouth 2 (two) times daily with meals.  Qty: 60 tablet, Refills: 11    Comments: .      clopidogreL (PLAVIX) 75 mg tablet Take 1 tablet (75 mg total) by mouth once daily.  Qty: 90 tablet, Refills: 3      coenzyme Q10 100 mg capsule Take 100 mg by mouth once daily.      eplerenone (INSPRA) 50 MG Tab Take 1 tablet (50 mg total) by mouth once daily.  Qty: 90 tablet, Refills: 3    Comments: Replacement for spironolactone. Thanks!  Associated Diagnoses: Essential hypertension      isosorbide mononitrate (IMDUR) 60 MG 24 hr tablet Take 1 tablet (60 mg total) by mouth once daily.  Qty: 90 tablet, Refills: 3      levothyroxine (SYNTHROID) 50 MCG tablet Take one tablet PO every morning on an empty stomach and wait at least 1 hour before eating or taking other medications  Qty: 30 tablet, Refills: 11    Associated  Diagnoses: Hypothyroidism, unspecified type      ranolazine (RANEXA) 500 MG Tb12 Take 1 tablet (500 mg total) by mouth 2 (two) times daily.  Qty: 180 tablet, Refills: 3      rosuvastatin (CRESTOR) 40 MG Tab TAKE 1 TABLET(40 MG) BY MOUTH EVERY EVENING  Qty: 90 tablet, Refills: 3    Associated Diagnoses: Mixed hyperlipidemia      albuterol (VENTOLIN HFA) 90 mcg/actuation inhaler Inhale 2 puffs into the lungs every 6 (six) hours as needed for Wheezing. Rescue  Qty: 18 g, Refills: 0      apixaban (ELIQUIS) 5 mg Tab Take 1 tablet (5 mg total) by mouth 2 (two) times daily.  Qty: 60 tablet, Refills: 11    Associated Diagnoses: Paroxysmal atrial fibrillation      nitroGLYCERIN (NITROSTAT) 0.4 MG SL tablet Place 1 tablet (0.4 mg total) under the tongue every 5 (five) minutes as needed for Chest pain.  Qty: 20 tablet, Refills: 12             Discharge Procedure Orders   Cardiac rehab phase II   Standing Status: Future Standing Exp. Date: 05/16/24     Order Specific Question Answer Comments   Department [facility] CARDIAC REHAB    Select qualifying diagnosis: Z98.61 - Coronary angioplasty status        Signed:  Sawyer Vigil MD  Cardiovascular Fellow  [facility]

## 2023-05-16 NOTE — INTERVAL H&P NOTE
The patient has been examined and the H&P has been reviewed:          Patient can proceed with non cardiac surgery with acceptable risks        Hold aspirin and plavix 5 days before the case       Thanks        ZN        Active Hospital Problems    Diagnosis  POA    *Abnormal cardiovascular stress test [R94.39]  Yes         Nuclear stress test 2/2019     + ECG with 2 mm ST depression   No wall motion abnormalities   Test stopped at 6 minutes, 7 METs, 86% predicted heart rate   Stopped because of R leg claudication + ECG changes                Resolved Hospital Problems   No resolved problems to display.

## 2023-05-17 ENCOUNTER — TELEPHONE (OUTPATIENT)
Dept: CARDIAC REHAB | Facility: CLINIC | Age: 63
End: 2023-05-17
Payer: MEDICAID

## 2023-05-17 VITALS
WEIGHT: 185 LBS | TEMPERATURE: 98 F | DIASTOLIC BLOOD PRESSURE: 94 MMHG | OXYGEN SATURATION: 100 % | SYSTOLIC BLOOD PRESSURE: 159 MMHG | HEART RATE: 64 BPM | BODY MASS INDEX: 26.48 KG/M2 | HEIGHT: 70 IN | RESPIRATION RATE: 18 BRPM

## 2023-05-17 NOTE — TELEPHONE ENCOUNTER
Letter regarding Phase II cardiac rehab was sent to patient, along with telephone # for [facility].  Krystal Easton RN  Cardiac Rehab Nurse

## 2023-05-20 ENCOUNTER — PATIENT MESSAGE (OUTPATIENT)
Dept: ADMINISTRATIVE | Facility: OTHER | Age: 63
End: 2023-05-20
Payer: MEDICAID

## 2023-05-21 NOTE — H&P
Patient ID:  Domingo Grsos is a 62 y.o. male who presents for follow up of Coronary Artery Disease, Hypertension, Hyperlipidemia, Atrial Fibrillation, and Abnormal Stress Test           HPI:               The patient location is: home     Visit type: Virtual visit with synchronous audio and video  Total time spent with patient: 35 minutes of chart review, discussion, medications update, and charting.   Each patient to whom we provide medical services by telemedicine is:  (1) informed of the relationship between the physician and patient and the respective role of any other health care provider with respect to management of the patient; and (2) notified that he or she may decline to receive medical services by telemedicine and may withdraw from such care at any time.                 Domingo Gross 62 y.o. male is here follow up and complaining of chest discomfort.  He is taken nitroglycerin at least once.  Worse pain 5/10.  He is compliant with his medications.  He is tolerating Eliquis well.           He has a history CAD, HTN, HLP, PAF in NSR, edema, CKD, anemia related to CKD, and PAD with winsome IIb claudication. He is s/p bilateral SFA, GRUPO, EIA, and R CFA revascularization. His peripheral vascular intervention in 12/2021 was cancelled due to severe anemia with a H/H that required a transfusion with subsequent ongoing intervention by heme/onc. He has a GI work up for anemia was normal. His H/H has improved with heme/onc care.          He was admitted 10/4/2019 for cardiac arrest. He was fully rescued. Initial rhythm was afib with rvr. He was not taken immediately to the cath lab because of ARF + hypokalemia. He had a diagnostic angiogram which revealed patent LM, LAD, RCA stent, and new ostial LCX lesion 99% with R to L collaterals. He had PCI with REZA after his renal function returned to baseline.            8/19/2021 with CP HTN urgency with . Added nifedipine and BP has been stable. No  CP                 ELISABETH with stress 5/2017:              R 1.01 to 0.44              L 0.92 to 0.45               After 6 minutes ambulation        CTA 5/2017:                    Patent distal aorta and bilateral GRUPO/EIA stents              L EIA with de shawna 90% stenosis              L SFA 80% with 3 vessel run off                 R EIA/CFA with moderate disease              R SFA 80% stenosis with 3 vessel run off           Peripheral angiogram with intervention 6/2017         Patent bilateral GRUPO/EIA stents.    De shawna 80% left EIA stenosis treated with 9.0 x 80 mm Lifestar  stent post dilated with 8.0 mm balloon.    Bilateral 70-80% SFA stenosis with 3 vessel run off.           3/2018        L SFA intervention for claudication              75% L SFA with 3 vessel run off              7 mmHg resting and 33 mmHg hyperemic mean gradient                                L CFA antegrade access              PTA with 6.0 x 150 mm              PTA with 6.0 x 150 mm Lutonix              3 vessel run off               4/13/2018                    S/p R SFA PTA for winsome IIb claudication              R CFA antegrade access                    R CFA with 50% stenosis-heavily calcified lesion                          Baseline /90                    R SFA with 70% stenosis with a significant resting and hyperemic mean gradient                3 vessel run off                        PTA of R SFA with 6.0 x 150 + 6.0 x 60 mm Lutonix DCB           5/2/2018              Patent arteries post revascularization           2/2019                 R 0.84 to 0.27              L 0.90 to 0.66              After ambulation              Severe R calf claudication              Nuclear stress test 2/2019                 + ECG with 2 mm ST depression              No wall motion abnormalities              Test stopped at 6 minutes, 7 METs, 86% predicted heart rate              Stopped because of R leg claudication + ECG changes               S/p PCI RCA and LAD with REZA 3/2019                    RCA 3.5 x 22 mm Resolute REZA              LAD 2.5 x 30 and 2.5 x 25 mm Resolute REZA        Peripheral aortogram 5/10/2019                 R CFA 95% stenosis              3 vessel run off           Seen by Dr. Giron for R CFA endarterectomy but preferable should be off plavix for at least 5 days. He has a risk for stent thrombosis with premature interruption of DAPT. He later had R CFA atherectomy + PTA with DCB.               10/11/2019 for cardiac arrest         S/p staged LCX PCI                    L CFA access              IVUS guided PCI              3.0 x 15 mm Resolute REZA post dilated with 3.5 NC balloon            PET stress 2/2020                 No ischemia        Echo 2/2021                            EF 55%              Normal diastolic fn              Normal RV fn              Mild MR/TR/TN                      Arterial US 7/2021                 Bilateral SFA disease > 70        Venous US 7/2021                 No DVT              No superficial reflux              R POP vein reflux > 500 msec     Echo 8/2021                            Normal EF              Normal RV fn              Mild-mod MR              Mod TN              PASP 26 mmHg           ELISABETH 11/2021                  Resting ELISABETH right 0.84 and left 0.83              Exercise ELISABETH right 0.39 and left 0.32 after 3.5 minutes ambulation              TBI right 0.58 and left 0.48              US 11/2021                            Bilateral mid SFA high grade disease        S/p PTA of L CFA + SFA with atherectomy + DCB (5/2022)         S/p PTA of R CFA with atherectomy + DCB (8/2022)        Echo 11/2022                 Nl EF              Normal LA              Mild MR/TR/TN                      Nuc stress test 3/2023        ECG portion of the study is positive for ischemia.   Specificity is reduced secondary to resting ST abnormalities.  Reversible ischemia along the inferior wall, near the  base of LV with hypokinesis.  Dilated left ventricle.  Decreased ejection fraction estimated to be 34% during stress and 48% during rest.           Patient Active Problem List     Diagnosis Date Noted    Cardiac arrest 10/04/2019       Priority: High    Atherosclerosis of native artery of both lower extremities with intermittent claudication 03/02/2018       Priority: High    Hypothyroidism 03/02/2023    Unstable angina 03/02/2023       I have messaged his cardiologist and electrophysiologist about this. Currently awaiting response        Persistent atrial fibrillation 11/12/2022    Anemia due to stage 4 chronic kidney disease 12/15/2021    CKD (chronic kidney disease) stage 4, GFR 15-29 ml/min 08/20/2021    COPD (chronic obstructive pulmonary disease) 08/20/2021    Nuclear sclerosis, bilateral 06/08/2020    Nephrotic range proteinuria 04/06/2020    Hypokalemia 10/04/2019    Bilateral carotid artery stenosis      Coronary artery disease involving native coronary artery of native heart with angina pectoris with documented spasm 03/29/2019             3/29/2019     S/p LHC via R radial           LM, LAD, and LCX are patent with luminal irregularities  RCA mid 95% calcified lesion  Normal EF with LVEDP 8 mmHg           PCI of mid LAD with 2.5 x 30 and 2.5 x 15 mm Resolute REZA  PCI of proximal RCA to treat guide dissection with 3.5 x 22 Resolute REZA           10/11/2019 for cardiac arrest         S/p staged LCX PCI                    L CFA access              IVUS guided PCI              3.0 x 15 mm Resolute REZA post dilated with 3.5 NC balloon              Abnormal cardiovascular stress test 02/27/2019             Nuclear stress test 2/2019                 + ECG with 2 mm ST depression              No wall motion abnormalities              Test stopped at 6 minutes, 7 METs, 86% predicted heart rate              Stopped because of R leg claudication + ECG changes                Venous insufficiency of both lower  extremities 06/04/2018    Hyperlipidemia 06/12/2015    DJD (degenerative joint disease), lumbar 05/27/2015    DDD (degenerative disc disease) 05/27/2015    Claudication in peripheral vascular disease 06/13/2014    HTN (hypertension) 04/29/2013                           LAST HbA1c        Lab Results   Component Value Date     HGBA1C 5.2 02/27/2023         Lipid panel        Lab Results   Component Value Date     CHOL 144 02/27/2023     CHOL 116 (L) 09/02/2022     CHOL 105 (L) 08/20/2021            Lab Results   Component Value Date     HDL 55 02/27/2023     HDL 50 09/02/2022     HDL 44 08/20/2021            Lab Results   Component Value Date     LDLCALC 75.8 02/27/2023     LDLCALC 41.6 (L) 09/02/2022     LDLCALC 51.0 (L) 08/20/2021            Lab Results   Component Value Date     TRIG 66 02/27/2023     TRIG 122 09/02/2022     TRIG 50 08/20/2021            Lab Results   Component Value Date     CHOLHDL 38.2 02/27/2023     CHOLHDL 43.1 09/02/2022     CHOLHDL 41.9 08/20/2021               Review of Systems   Constitutional: Negative for diaphoresis, night sweats, weight gain and weight loss.   HENT:  Negative for congestion.    Eyes:  Negative for blurred vision, discharge and double vision.   Cardiovascular:  Negative for chest pain, claudication, cyanosis, dyspnea on exertion, irregular heartbeat, leg swelling, near-syncope, orthopnea, palpitations, paroxysmal nocturnal dyspnea and syncope.   Respiratory:  Negative for cough, shortness of breath and wheezing.    Endocrine: Negative for cold intolerance, heat intolerance and polyphagia.   Hematologic/Lymphatic: Negative for adenopathy and bleeding problem. Does not bruise/bleed easily.   Skin:  Negative for dry skin and nail changes.   Musculoskeletal:  Negative for arthritis, back pain, falls, joint pain, myalgias and neck pain.   Gastrointestinal:  Negative for bloating, abdominal pain, change in bowel habit and constipation.   Genitourinary:  Negative for bladder  incontinence, dysuria, flank pain, genital sores and missed menses.   Neurological:  Negative for aphonia, brief paralysis, difficulty with concentration, dizziness and weakness.   Psychiatric/Behavioral:  Negative for altered mental status and memory loss. The patient does not have insomnia.    Allergic/Immunologic: Negative for environmental allergies.      Objective:   Physical Exam  Constitutional:       Appearance: He is well-developed.      Interventions: He is not intubated.  HENT:      Head: Normocephalic and atraumatic.      Right Ear: External ear normal.      Left Ear: External ear normal.   Eyes:      General: No scleral icterus.        Right eye: No discharge.         Left eye: No discharge.      Conjunctiva/sclera: Conjunctivae normal.      Pupils: Pupils are equal, round, and reactive to light.   Neck:      Thyroid: No thyromegaly.      Vascular: Normal carotid pulses. No carotid bruit, hepatojugular reflux or JVD.      Trachea: No tracheal deviation.   Cardiovascular:      Rate and Rhythm: Normal rate and regular rhythm. No extrasystoles are present.     Chest Wall: PMI is not displaced.      Pulses:           Carotid pulses are 2+ on the right side and 2+ on the left side.       Radial pulses are 2+ on the right side and 2+ on the left side.        Femoral pulses are 2+ on the right side and 2+ on the left side.       Popliteal pulses are 2+ on the right side and 2+ on the left side.        Dorsalis pedis pulses are 1+ on the right side and 2+ on the left side.        Posterior tibial pulses are 1+ on the right side and 2+ on the left side.      Heart sounds: S1 normal and S2 normal. Heart sounds not distant. No midsystolic click. No murmur heard.    No friction rub. No gallop. No S3 sounds.      Comments:         Biphasic R DP and weak but biphasic R PT doppler signals         Biphasic L DP and PT doppler signals              Pulmonary:      Effort: Pulmonary effort is normal. No tachypnea,  bradypnea, accessory muscle usage or respiratory distress. He is not intubated.      Breath sounds: Normal breath sounds. No stridor. No decreased breath sounds, wheezing or rales.   Chest:      Chest wall: No tenderness.   Abdominal:      General: There is no distension or abdominal bruit.      Palpations: There is no mass or pulsatile mass.      Tenderness: There is no abdominal tenderness. There is no guarding or rebound.   Musculoskeletal:         General: No tenderness. Normal range of motion.      Cervical back: Normal range of motion and neck supple.   Lymphadenopathy:      Cervical: No cervical adenopathy.   Skin:     General: Skin is warm.      Coloration: Skin is not pale.      Findings: No erythema or rash.   Neurological:      Mental Status: He is alert and oriented to person, place, and time.      Cranial Nerves: No cranial nerve deficit.      Coordination: Coordination normal.      Deep Tendon Reflexes: Reflexes are normal and symmetric.   Psychiatric:         Behavior: Behavior normal.         Thought Content: Thought content normal.         Judgment: Judgment normal.            Assessment:      1. Coronary artery disease involving native coronary artery of native heart with angina pectoris with documented spasm    2. Atherosclerosis of native artery of both lower extremities with intermittent claudication    3. Mixed hyperlipidemia    4. Bilateral carotid artery stenosis    5. Claudication in peripheral vascular disease    6. CKD (chronic kidney disease) stage 4, GFR 15-29 ml/min    7. Abnormal cardiovascular stress test    8. Venous insufficiency of both lower extremities    9. Anemia due to stage 4 chronic kidney disease          Plan:                  He had an abnormal stress test he will proceed with coronary angiography plus minus intervention.  Imdur 30 mg was added to his medications.         R radial access  REZA candidate              He was instructed to go to the emergency room he has any  recurrent discomfort without resolution.  He expressed verbal understanding of the plan.  All his questions were answered to his satisfaction.     Medical therapy for CAD  DAPT with aspirin + plavix  Intense statin therapy  Arb  Pletal         Target LDL < 70  Low salt diet  Weight loss  Target BP < 130/80 mmHg              Continue with current medical plan and lifestyle changes.  Return sooner for concerns or questions. If symptoms persist go to the ED  I have reviewed all pertinent data on this patient               Follow up as scheduled. Return sooner for concerns or questions  He expressed verbal understanding and agreed with the plan           Greater than 50% of the visit of 45 minutes was spent counseling, educating, and coordinating the care of the patient.        -In today's visit, at least 4 established conditions that pose a risk to life or bodily function have been addressed and the conditions are severe.     -In today's visit, monitoring for drug toxicity was accomplished.           Follow up as scheduled. Return sooner for concerns or questions  I have reviewed the patient's medical history in detail and updated the computerized patient record.     No orders of the defined types were placed in this encounter.          Patient's Medications   New Prescriptions     No medications on file   Previous Medications     ALBUTEROL (VENTOLIN HFA) 90 MCG/ACTUATION INHALER    Inhale 2 puffs into the lungs every 6 (six) hours as needed for Wheezing. Rescue     APIXABAN (ELIQUIS) 5 MG TAB    Take 1 tablet (5 mg total) by mouth 2 (two) times daily.     ASPIRIN (ASPIR-81 ORAL)    Take 81 mg by mouth once daily.     CARVEDILOL (COREG) 25 MG TABLET    Take 1 tablet (25 mg total) by mouth 2 (two) times daily with meals.     COENZYME Q10 100 MG CAPSULE    Take 100 mg by mouth once daily.     EPLERENONE (INSPRA) 50 MG TAB    Take 1 tablet (50 mg total) by mouth once daily.     FUROSEMIDE (LASIX) 40 MG TABLET    Take 1  tablet (40 mg total) by mouth once daily.     ISOSORBIDE MONONITRATE (IMDUR) 30 MG 24 HR TABLET    Take 1 tablet (30 mg total) by mouth once daily.     LEVOTHYROXINE (SYNTHROID) 25 MCG TABLET    Take one tablet PO every morning on an empty stomach and wait at least 1 hour before eating or taking other medications     NITROGLYCERIN (NITROSTAT) 0.4 MG SL TABLET    Place 1 tablet (0.4 mg total) under the tongue every 5 (five) minutes as needed for Chest pain.     ROSUVASTATIN (CRESTOR) 40 MG TAB    TAKE 1 TABLET(40 MG) BY MOUTH EVERY EVENING   Modified Medications     No medications on file   Discontinued Medications     No medications on file

## 2023-05-25 ENCOUNTER — PATIENT MESSAGE (OUTPATIENT)
Dept: CARDIOLOGY | Facility: CLINIC | Age: 63
End: 2023-05-25
Payer: MEDICAID

## 2023-05-26 NOTE — H&P
ID:  Domingo Gross is a 62 y.o. male who presents for follow up of Coronary Artery Disease, Hypertension, Hyperlipidemia, Atrial Fibrillation, and Abnormal Stress Test           HPI:               The patient location is: home     Visit type: Virtual visit with synchronous audio and video  Total time spent with patient: 35 minutes of chart review, discussion, medications update, and charting.   Each patient to whom we provide medical services by telemedicine is:  (1) informed of the relationship between the physician and patient and the respective role of any other health care provider with respect to management of the patient; and (2) notified that he or she may decline to receive medical services by telemedicine and may withdraw from such care at any time.                 Domingo Gross 62 y.o. male is here follow up and complaining of chest discomfort.  He is taken nitroglycerin at least once.  Worse pain 5/10.  He is compliant with his medications.  He is tolerating Eliquis well.           He has a history CAD, HTN, HLP, PAF in NSR, edema, CKD, anemia related to CKD, and PAD with winsome IIb claudication. He is s/p bilateral SFA, GRUPO, EIA, and R CFA revascularization. His peripheral vascular intervention in 12/2021 was cancelled due to severe anemia with a H/H that required a transfusion with subsequent ongoing intervention by heme/onc. He has a GI work up for anemia was normal. His H/H has improved with heme/onc care.          He was admitted 10/4/2019 for cardiac arrest. He was fully rescued. Initial rhythm was afib with rvr. He was not taken immediately to the cath lab because of ARF + hypokalemia. He had a diagnostic angiogram which revealed patent LM, LAD, RCA stent, and new ostial LCX lesion 99% with R to L collaterals. He had PCI with REZA after his renal function returned to baseline.            8/19/2021 with CP HTN urgency with . Added nifedipine and BP has been stable. No CP                  ELISABETH with stress 5/2017:              R 1.01 to 0.44              L 0.92 to 0.45               After 6 minutes ambulation        CTA 5/2017:                    Patent distal aorta and bilateral GRUPO/EIA stents              L EIA with de shawna 90% stenosis              L SFA 80% with 3 vessel run off                 R EIA/CFA with moderate disease              R SFA 80% stenosis with 3 vessel run off           Peripheral angiogram with intervention 6/2017         Patent bilateral GRUPO/EIA stents.    De shawna 80% left EIA stenosis treated with 9.0 x 80 mm Lifestar  stent post dilated with 8.0 mm balloon.    Bilateral 70-80% SFA stenosis with 3 vessel run off.           3/2018        L SFA intervention for claudication              75% L SFA with 3 vessel run off              7 mmHg resting and 33 mmHg hyperemic mean gradient                                L CFA antegrade access              PTA with 6.0 x 150 mm              PTA with 6.0 x 150 mm Lutonix              3 vessel run off               4/13/2018                    S/p R SFA PTA for winsome IIb claudication              R CFA antegrade access                    R CFA with 50% stenosis-heavily calcified lesion                          Baseline /90                    R SFA with 70% stenosis with a significant resting and hyperemic mean gradient                3 vessel run off                        PTA of R SFA with 6.0 x 150 + 6.0 x 60 mm Lutonix DCB           5/2/2018              Patent arteries post revascularization           2/2019                 R 0.84 to 0.27              L 0.90 to 0.66              After ambulation              Severe R calf claudication              Nuclear stress test 2/2019                 + ECG with 2 mm ST depression              No wall motion abnormalities              Test stopped at 6 minutes, 7 METs, 86% predicted heart rate              Stopped because of R leg claudication + ECG changes              S/p PCI  RCA and LAD with REZA 3/2019                    RCA 3.5 x 22 mm Resolute REZA              LAD 2.5 x 30 and 2.5 x 25 mm Resolute REZA        Peripheral aortogram 5/10/2019                 R CFA 95% stenosis              3 vessel run off           Seen by Dr. Giron for R CFA endarterectomy but preferable should be off plavix for at least 5 days. He has a risk for stent thrombosis with premature interruption of DAPT. He later had R CFA atherectomy + PTA with DCB.               10/11/2019 for cardiac arrest         S/p staged LCX PCI                    L CFA access              IVUS guided PCI              3.0 x 15 mm Resolute REZA post dilated with 3.5 NC balloon            PET stress 2/2020                 No ischemia        Echo 2/2021                            EF 55%              Normal diastolic fn              Normal RV fn              Mild MR/TR/VA                      Arterial US 7/2021                 Bilateral SFA disease > 70        Venous US 7/2021                 No DVT              No superficial reflux              R POP vein reflux > 500 msec     Echo 8/2021                            Normal EF              Normal RV fn              Mild-mod MR              Mod VA              PASP 26 mmHg           ELISABETH 11/2021                  Resting ELISABETH right 0.84 and left 0.83              Exercise ELISABETH right 0.39 and left 0.32 after 3.5 minutes ambulation              TBI right 0.58 and left 0.48              US 11/2021                            Bilateral mid SFA high grade disease        S/p PTA of L CFA + SFA with atherectomy + DCB (5/2022)         S/p PTA of R CFA with atherectomy + DCB (8/2022)        Echo 11/2022                 Nl EF              Normal LA              Mild MR/TR/VA                      Nuc stress test 3/2023        ECG portion of the study is positive for ischemia.   Specificity is reduced secondary to resting ST abnormalities.  Reversible ischemia along the inferior wall, near the base of  LV with hypokinesis.  Dilated left ventricle.  Decreased ejection fraction estimated to be 34% during stress and 48% during rest.           Patient Active Problem List     Diagnosis Date Noted    Cardiac arrest 10/04/2019       Priority: High    Atherosclerosis of native artery of both lower extremities with intermittent claudication 03/02/2018       Priority: High    Hypothyroidism 03/02/2023    Unstable angina 03/02/2023       I have messaged his cardiologist and electrophysiologist about this. Currently awaiting response        Persistent atrial fibrillation 11/12/2022    Anemia due to stage 4 chronic kidney disease 12/15/2021    CKD (chronic kidney disease) stage 4, GFR 15-29 ml/min 08/20/2021    COPD (chronic obstructive pulmonary disease) 08/20/2021    Nuclear sclerosis, bilateral 06/08/2020    Nephrotic range proteinuria 04/06/2020    Hypokalemia 10/04/2019    Bilateral carotid artery stenosis      Coronary artery disease involving native coronary artery of native heart with angina pectoris with documented spasm 03/29/2019             3/29/2019     S/p LHC via R radial           LM, LAD, and LCX are patent with luminal irregularities  RCA mid 95% calcified lesion  Normal EF with LVEDP 8 mmHg           PCI of mid LAD with 2.5 x 30 and 2.5 x 15 mm Resolute REZA  PCI of proximal RCA to treat guide dissection with 3.5 x 22 Resolute REZA           10/11/2019 for cardiac arrest         S/p staged LCX PCI                    L CFA access              IVUS guided PCI              3.0 x 15 mm Resolute REZA post dilated with 3.5 NC balloon              Abnormal cardiovascular stress test 02/27/2019             Nuclear stress test 2/2019                 + ECG with 2 mm ST depression              No wall motion abnormalities              Test stopped at 6 minutes, 7 METs, 86% predicted heart rate              Stopped because of R leg claudication + ECG changes                Venous insufficiency of both lower extremities  06/04/2018    Hyperlipidemia 06/12/2015    DJD (degenerative joint disease), lumbar 05/27/2015    DDD (degenerative disc disease) 05/27/2015    Claudication in peripheral vascular disease 06/13/2014    HTN (hypertension) 04/29/2013                           LAST HbA1c        Lab Results   Component Value Date     HGBA1C 5.2 02/27/2023         Lipid panel        Lab Results   Component Value Date     CHOL 144 02/27/2023     CHOL 116 (L) 09/02/2022     CHOL 105 (L) 08/20/2021            Lab Results   Component Value Date     HDL 55 02/27/2023     HDL 50 09/02/2022     HDL 44 08/20/2021            Lab Results   Component Value Date     LDLCALC 75.8 02/27/2023     LDLCALC 41.6 (L) 09/02/2022     LDLCALC 51.0 (L) 08/20/2021            Lab Results   Component Value Date     TRIG 66 02/27/2023     TRIG 122 09/02/2022     TRIG 50 08/20/2021            Lab Results   Component Value Date     CHOLHDL 38.2 02/27/2023     CHOLHDL 43.1 09/02/2022     CHOLHDL 41.9 08/20/2021               Review of Systems   Constitutional: Negative for diaphoresis, night sweats, weight gain and weight loss.   HENT:  Negative for congestion.    Eyes:  Negative for blurred vision, discharge and double vision.   Cardiovascular:  Negative for chest pain, claudication, cyanosis, dyspnea on exertion, irregular heartbeat, leg swelling, near-syncope, orthopnea, palpitations, paroxysmal nocturnal dyspnea and syncope.   Respiratory:  Negative for cough, shortness of breath and wheezing.    Endocrine: Negative for cold intolerance, heat intolerance and polyphagia.   Hematologic/Lymphatic: Negative for adenopathy and bleeding problem. Does not bruise/bleed easily.   Skin:  Negative for dry skin and nail changes.   Musculoskeletal:  Negative for arthritis, back pain, falls, joint pain, myalgias and neck pain.   Gastrointestinal:  Negative for bloating, abdominal pain, change in bowel habit and constipation.   Genitourinary:  Negative for bladder incontinence,  dysuria, flank pain, genital sores and missed menses.   Neurological:  Negative for aphonia, brief paralysis, difficulty with concentration, dizziness and weakness.   Psychiatric/Behavioral:  Negative for altered mental status and memory loss. The patient does not have insomnia.    Allergic/Immunologic: Negative for environmental allergies.      Objective:   Physical Exam  Constitutional:       Appearance: He is well-developed.      Interventions: He is not intubated.  HENT:      Head: Normocephalic and atraumatic.      Right Ear: External ear normal.      Left Ear: External ear normal.   Eyes:      General: No scleral icterus.        Right eye: No discharge.         Left eye: No discharge.      Conjunctiva/sclera: Conjunctivae normal.      Pupils: Pupils are equal, round, and reactive to light.   Neck:      Thyroid: No thyromegaly.      Vascular: Normal carotid pulses. No carotid bruit, hepatojugular reflux or JVD.      Trachea: No tracheal deviation.   Cardiovascular:      Rate and Rhythm: Normal rate and regular rhythm. No extrasystoles are present.     Chest Wall: PMI is not displaced.      Pulses:           Carotid pulses are 2+ on the right side and 2+ on the left side.       Radial pulses are 2+ on the right side and 2+ on the left side.        Femoral pulses are 2+ on the right side and 2+ on the left side.       Popliteal pulses are 2+ on the right side and 2+ on the left side.        Dorsalis pedis pulses are 1+ on the right side and 2+ on the left side.        Posterior tibial pulses are 1+ on the right side and 2+ on the left side.      Heart sounds: S1 normal and S2 normal. Heart sounds not distant. No midsystolic click. No murmur heard.    No friction rub. No gallop. No S3 sounds.      Comments:         Biphasic R DP and weak but biphasic R PT doppler signals         Biphasic L DP and PT doppler signals              Pulmonary:      Effort: Pulmonary effort is normal. No tachypnea, bradypnea, accessory  muscle usage or respiratory distress. He is not intubated.      Breath sounds: Normal breath sounds. No stridor. No decreased breath sounds, wheezing or rales.   Chest:      Chest wall: No tenderness.   Abdominal:      General: There is no distension or abdominal bruit.      Palpations: There is no mass or pulsatile mass.      Tenderness: There is no abdominal tenderness. There is no guarding or rebound.   Musculoskeletal:         General: No tenderness. Normal range of motion.      Cervical back: Normal range of motion and neck supple.   Lymphadenopathy:      Cervical: No cervical adenopathy.   Skin:     General: Skin is warm.      Coloration: Skin is not pale.      Findings: No erythema or rash.   Neurological:      Mental Status: He is alert and oriented to person, place, and time.      Cranial Nerves: No cranial nerve deficit.      Coordination: Coordination normal.      Deep Tendon Reflexes: Reflexes are normal and symmetric.   Psychiatric:         Behavior: Behavior normal.         Thought Content: Thought content normal.         Judgment: Judgment normal.            Assessment:      1. Coronary artery disease involving native coronary artery of native heart with angina pectoris with documented spasm    2. Atherosclerosis of native artery of both lower extremities with intermittent claudication    3. Mixed hyperlipidemia    4. Bilateral carotid artery stenosis    5. Claudication in peripheral vascular disease    6. CKD (chronic kidney disease) stage 4, GFR 15-29 ml/min    7. Abnormal cardiovascular stress test    8. Venous insufficiency of both lower extremities    9. Anemia due to stage 4 chronic kidney disease          Plan:                  He had an abnormal stress test he will proceed with coronary angiography plus minus intervention.  Imdur 30 mg was added to his medications.         R radial access  REZA candidate              He was instructed to go to the emergency room he has any recurrent discomfort  without resolution.  He expressed verbal understanding of the plan.  All his questions were answered to his satisfaction.     Medical therapy for CAD  DAPT with aspirin + plavix  Intense statin therapy  Arb  Pletal         Target LDL < 70  Low salt diet  Weight loss  Target BP < 130/80 mmHg              Continue with current medical plan and lifestyle changes.  Return sooner for concerns or questions. If symptoms persist go to the ED  I have reviewed all pertinent data on this patient               Follow up as scheduled. Return sooner for concerns or questions  He expressed verbal understanding and agreed with the plan           Greater than 50% of the visit of 45 minutes was spent counseling, educating, and coordinating the care of the patient.        -In today's visit, at least 4 established conditions that pose a risk to life or bodily function have been addressed and the conditions are severe.     -In today's visit, monitoring for drug toxicity was accomplished.           Follow up as scheduled. Return sooner for concerns or questions  I have reviewed the patient's medical history in detail and updated the computerized patient record.     No orders of the defined types were placed in this encounter.          Patient's Medications   New Prescriptions     No medications on file   Previous Medications     ALBUTEROL (VENTOLIN HFA) 90 MCG/ACTUATION INHALER    Inhale 2 puffs into the lungs every 6 (six) hours as needed for Wheezing. Rescue     APIXABAN (ELIQUIS) 5 MG TAB    Take 1 tablet (5 mg total) by mouth 2 (two) times daily.     ASPIRIN (ASPIR-81 ORAL)    Take 81 mg by mouth once daily.     CARVEDILOL (COREG) 25 MG TABLET    Take 1 tablet (25 mg total) by mouth 2 (two) times daily with meals.     COENZYME Q10 100 MG CAPSULE    Take 100 mg by mouth once daily.     EPLERENONE (INSPRA) 50 MG TAB    Take 1 tablet (50 mg total) by mouth once daily.     FUROSEMIDE (LASIX) 40 MG TABLET    Take 1 tablet (40 mg total) by  mouth once daily.     ISOSORBIDE MONONITRATE (IMDUR) 30 MG 24 HR TABLET    Take 1 tablet (30 mg total) by mouth once daily.     LEVOTHYROXINE (SYNTHROID) 25 MCG TABLET    Take one tablet PO every morning on an empty stomach and wait at least 1 hour before eating or taking other medications     NITROGLYCERIN (NITROSTAT) 0.4 MG SL TABLET    Place 1 tablet (0.4 mg total) under the tongue every 5 (five) minutes as needed for Chest pain.     ROSUVASTATIN (CRESTOR) 40 MG TAB    TAKE 1 TABLET(40 MG) BY MOUTH EVERY EVENING   Modified Medications     No medications on file   Discontinued Medications     No medications on file

## 2023-06-01 ENCOUNTER — TELEPHONE (OUTPATIENT)
Dept: ELECTROPHYSIOLOGY | Facility: CLINIC | Age: 63
End: 2023-06-01
Payer: MEDICAID

## 2023-06-01 NOTE — TELEPHONE ENCOUNTER
Spoke with Patient scheduled for JOSE/DCCV procedure on 6/12/2023 ( date per pt request) with Dr Chavarria. Procedure details reviewed and advised that pre procedure patient instructions will be sent via patient portal as requested. Advised to call the office for any questions or concerns prior to scheduled procedure. Understanding verbalized.

## 2023-06-02 ENCOUNTER — TELEPHONE (OUTPATIENT)
Dept: ELECTROPHYSIOLOGY | Facility: CLINIC | Age: 63
End: 2023-06-02
Payer: MEDICAID

## 2023-06-02 ENCOUNTER — PATIENT MESSAGE (OUTPATIENT)
Dept: MEDSURG UNIT | Facility: HOSPITAL | Age: 63
End: 2023-06-02
Payer: MEDICAID

## 2023-06-02 ENCOUNTER — PATIENT MESSAGE (OUTPATIENT)
Dept: ELECTROPHYSIOLOGY | Facility: CLINIC | Age: 63
End: 2023-06-02
Payer: MEDICAID

## 2023-06-02 DIAGNOSIS — Z01.818 PRE-OP TESTING: ICD-10-CM

## 2023-06-02 DIAGNOSIS — D63.1 ANEMIA DUE TO STAGE 4 CHRONIC KIDNEY DISEASE: ICD-10-CM

## 2023-06-02 DIAGNOSIS — I10 PRIMARY HYPERTENSION: ICD-10-CM

## 2023-06-02 DIAGNOSIS — N18.4 ANEMIA DUE TO STAGE 4 CHRONIC KIDNEY DISEASE: ICD-10-CM

## 2023-06-02 DIAGNOSIS — I48.19 PERSISTENT ATRIAL FIBRILLATION: Primary | ICD-10-CM

## 2023-06-02 NOTE — TELEPHONE ENCOUNTER
Spoke with patient and advised that his message was forwarded to Dr Chavarria regarding his inability to come to [facility] [place] for the cardioversion procedure. Advised that Dr Chavarria has reached out to his General Cardiologist at [place] to discuss the possibility of his cardiologist performing the procedure in [place]. Understanding verbalized and will wait for further instruction from his General Cardiologist in [place].

## 2023-06-02 NOTE — TELEPHONE ENCOUNTER
----- Message from Dorcas Gomez MA sent at 6/2/2023  2:08 PM CDT -----  Regarding: FW: please call    ----- Message -----  From: Rossy Roblero  Sent: 6/2/2023   2:04 PM CDT  To: [facility] Staff  Subject: please call                                      The pt is calling about his procedure. Please call him back @ [phone]. Thanks, Rossy

## 2023-06-05 ENCOUNTER — LAB VISIT (OUTPATIENT)
Dept: LAB | Facility: HOSPITAL | Age: 63
End: 2023-06-05
Attending: INTERNAL MEDICINE
Payer: COMMERCIAL

## 2023-06-05 DIAGNOSIS — I48.19 PERSISTENT ATRIAL FIBRILLATION: ICD-10-CM

## 2023-06-05 DIAGNOSIS — D63.1 ANEMIA DUE TO STAGE 4 CHRONIC KIDNEY DISEASE: ICD-10-CM

## 2023-06-05 DIAGNOSIS — N18.4 ANEMIA DUE TO STAGE 4 CHRONIC KIDNEY DISEASE: ICD-10-CM

## 2023-06-05 DIAGNOSIS — I10 PRIMARY HYPERTENSION: ICD-10-CM

## 2023-06-05 DIAGNOSIS — Z01.818 PRE-OP TESTING: ICD-10-CM

## 2023-06-05 LAB
ANION GAP SERPL CALC-SCNC: 8 MMOL/L (ref 8–16)
APTT PPP: 28.4 SEC (ref 21–32)
BASOPHILS # BLD AUTO: 0.03 K/UL (ref 0–0.2)
BASOPHILS NFR BLD: 0.5 % (ref 0–1.9)
BUN SERPL-MCNC: 26 MG/DL (ref 8–23)
CALCIUM SERPL-MCNC: 9.2 MG/DL (ref 8.7–10.5)
CHLORIDE SERPL-SCNC: 108 MMOL/L (ref 95–110)
CO2 SERPL-SCNC: 25 MMOL/L (ref 23–29)
CREAT SERPL-MCNC: 2.4 MG/DL (ref 0.5–1.4)
DIFFERENTIAL METHOD: ABNORMAL
EOSINOPHIL # BLD AUTO: 0.3 K/UL (ref 0–0.5)
EOSINOPHIL NFR BLD: 5 % (ref 0–8)
ERYTHROCYTE [DISTWIDTH] IN BLOOD BY AUTOMATED COUNT: 12.7 % (ref 11.5–14.5)
EST. GFR  (NO RACE VARIABLE): 30 ML/MIN/1.73 M^2
GLUCOSE SERPL-MCNC: 95 MG/DL (ref 70–110)
HCT VFR BLD AUTO: 30.6 % (ref 40–54)
HGB BLD-MCNC: 10.4 G/DL (ref 14–18)
IMM GRANULOCYTES # BLD AUTO: 0.01 K/UL (ref 0–0.04)
IMM GRANULOCYTES NFR BLD AUTO: 0.2 % (ref 0–0.5)
INR PPP: 1.1 (ref 0.8–1.2)
LYMPHOCYTES # BLD AUTO: 1.5 K/UL (ref 1–4.8)
LYMPHOCYTES NFR BLD: 22.9 % (ref 18–48)
MCH RBC QN AUTO: 29.5 PG (ref 27–31)
MCHC RBC AUTO-ENTMCNC: 34 G/DL (ref 32–36)
MCV RBC AUTO: 87 FL (ref 82–98)
MONOCYTES # BLD AUTO: 0.7 K/UL (ref 0.3–1)
MONOCYTES NFR BLD: 11.4 % (ref 4–15)
NEUTROPHILS # BLD AUTO: 3.8 K/UL (ref 1.8–7.7)
NEUTROPHILS NFR BLD: 60 % (ref 38–73)
NRBC BLD-RTO: 0 /100 WBC
PLATELET # BLD AUTO: 179 K/UL (ref 150–450)
PMV BLD AUTO: 10.9 FL (ref 9.2–12.9)
POTASSIUM SERPL-SCNC: 3.6 MMOL/L (ref 3.5–5.1)
PROTHROMBIN TIME: 11.9 SEC (ref 9–12.5)
RBC # BLD AUTO: 3.53 M/UL (ref 4.6–6.2)
SODIUM SERPL-SCNC: 141 MMOL/L (ref 136–145)
WBC # BLD AUTO: 6.34 K/UL (ref 3.9–12.7)

## 2023-06-05 PROCEDURE — 85025 COMPLETE CBC W/AUTO DIFF WBC: CPT | Performed by: INTERNAL MEDICINE

## 2023-06-05 PROCEDURE — 80048 BASIC METABOLIC PNL TOTAL CA: CPT | Performed by: INTERNAL MEDICINE

## 2023-06-05 PROCEDURE — 85610 PROTHROMBIN TIME: CPT | Performed by: INTERNAL MEDICINE

## 2023-06-05 PROCEDURE — 36415 COLL VENOUS BLD VENIPUNCTURE: CPT | Performed by: INTERNAL MEDICINE

## 2023-06-05 PROCEDURE — 85730 THROMBOPLASTIN TIME PARTIAL: CPT | Performed by: INTERNAL MEDICINE

## 2023-06-06 DIAGNOSIS — I48.19 PERSISTENT ATRIAL FIBRILLATION: Primary | ICD-10-CM

## 2023-06-06 DIAGNOSIS — I48.19 ATRIAL FIBRILLATION, PERSISTENT: Primary | ICD-10-CM

## 2023-06-16 ENCOUNTER — LAB VISIT (OUTPATIENT)
Dept: LAB | Facility: HOSPITAL | Age: 63
End: 2023-06-16
Attending: INTERNAL MEDICINE
Payer: COMMERCIAL

## 2023-06-16 DIAGNOSIS — E87.6 HYPOKALEMIA: ICD-10-CM

## 2023-06-16 DIAGNOSIS — E03.9 HYPOTHYROIDISM, UNSPECIFIED TYPE: ICD-10-CM

## 2023-06-16 LAB
ANION GAP SERPL CALC-SCNC: 5 MMOL/L (ref 8–16)
BUN SERPL-MCNC: 27 MG/DL (ref 8–23)
CALCIUM SERPL-MCNC: 8.7 MG/DL (ref 8.7–10.5)
CHLORIDE SERPL-SCNC: 112 MMOL/L (ref 95–110)
CO2 SERPL-SCNC: 26 MMOL/L (ref 23–29)
CREAT SERPL-MCNC: 2.4 MG/DL (ref 0.5–1.4)
EST. GFR  (NO RACE VARIABLE): 29.9 ML/MIN/1.73 M^2
GLUCOSE SERPL-MCNC: 111 MG/DL (ref 70–110)
POTASSIUM SERPL-SCNC: 4.1 MMOL/L (ref 3.5–5.1)
SODIUM SERPL-SCNC: 143 MMOL/L (ref 136–145)
TSH SERPL DL<=0.005 MIU/L-ACNC: 3.85 UIU/ML (ref 0.4–4)

## 2023-06-16 PROCEDURE — 80048 BASIC METABOLIC PNL TOTAL CA: CPT | Performed by: INTERNAL MEDICINE

## 2023-06-16 PROCEDURE — 84443 ASSAY THYROID STIM HORMONE: CPT | Performed by: INTERNAL MEDICINE

## 2023-06-16 PROCEDURE — 36415 COLL VENOUS BLD VENIPUNCTURE: CPT | Mod: PO | Performed by: INTERNAL MEDICINE

## 2023-06-19 ENCOUNTER — PATIENT MESSAGE (OUTPATIENT)
Dept: CARDIOLOGY | Facility: HOSPITAL | Age: 63
End: 2023-06-19
Payer: COMMERCIAL

## 2023-06-19 ENCOUNTER — ANESTHESIA EVENT (OUTPATIENT)
Dept: CARDIOLOGY | Facility: HOSPITAL | Age: 63
End: 2023-06-19
Payer: COMMERCIAL

## 2023-06-19 ENCOUNTER — HOSPITAL ENCOUNTER (OUTPATIENT)
Dept: CARDIOLOGY | Facility: HOSPITAL | Age: 63
Discharge: HOME | End: 2023-06-19
Attending: INTERNAL MEDICINE | Admitting: INTERNAL MEDICINE
Payer: COMMERCIAL

## 2023-06-19 ENCOUNTER — HOSPITAL ENCOUNTER (OUTPATIENT)
Facility: HOSPITAL | Age: 63
Discharge: HOME | End: 2023-06-19
Attending: INTERNAL MEDICINE | Admitting: INTERNAL MEDICINE
Payer: COMMERCIAL

## 2023-06-19 ENCOUNTER — ANESTHESIA (OUTPATIENT)
Dept: CARDIOLOGY | Facility: HOSPITAL | Age: 63
End: 2023-06-19
Payer: COMMERCIAL

## 2023-06-19 DIAGNOSIS — I48.91 ATRIAL FIBRILLATION: ICD-10-CM

## 2023-06-19 DIAGNOSIS — I48.91 ATRIAL FIBRILLATION STATUS POST CARDIOVERSION: ICD-10-CM

## 2023-06-19 DIAGNOSIS — I48.19 PERSISTENT ATRIAL FIBRILLATION: ICD-10-CM

## 2023-06-19 DIAGNOSIS — Z01.810 PRE-OPERATIVE CARDIOVASCULAR EXAMINATION: Primary | ICD-10-CM

## 2023-06-19 DIAGNOSIS — I48.19 ATRIAL FIBRILLATION, PERSISTENT: ICD-10-CM

## 2023-06-19 LAB
ANION GAP SERPL CALC-SCNC: 8 MMOL/L (ref 8–16)
BASOPHILS # BLD AUTO: 0.05 K/UL (ref 0–0.2)
BASOPHILS NFR BLD: 0.7 % (ref 0–1.9)
BUN SERPL-MCNC: 26 MG/DL (ref 8–23)
CALCIUM SERPL-MCNC: 9.1 MG/DL (ref 8.7–10.5)
CHLORIDE SERPL-SCNC: 109 MMOL/L (ref 95–110)
CO2 SERPL-SCNC: 25 MMOL/L (ref 23–29)
CREAT SERPL-MCNC: 2.3 MG/DL (ref 0.5–1.4)
DIFFERENTIAL METHOD: ABNORMAL
EOSINOPHIL # BLD AUTO: 0.4 K/UL (ref 0–0.5)
EOSINOPHIL NFR BLD: 5.3 % (ref 0–8)
ERYTHROCYTE [DISTWIDTH] IN BLOOD BY AUTOMATED COUNT: 13.1 % (ref 11.5–14.5)
EST. GFR  (NO RACE VARIABLE): 31 ML/MIN/1.73 M^2
GLUCOSE SERPL-MCNC: 95 MG/DL (ref 70–110)
HCT VFR BLD AUTO: 29.8 % (ref 40–54)
HGB BLD-MCNC: 10.1 G/DL (ref 14–18)
IMM GRANULOCYTES # BLD AUTO: 0.02 K/UL (ref 0–0.04)
IMM GRANULOCYTES NFR BLD AUTO: 0.3 % (ref 0–0.5)
LYMPHOCYTES # BLD AUTO: 1.5 K/UL (ref 1–4.8)
LYMPHOCYTES NFR BLD: 20.5 % (ref 18–48)
MCH RBC QN AUTO: 29.3 PG (ref 27–31)
MCHC RBC AUTO-ENTMCNC: 33.9 G/DL (ref 32–36)
MCV RBC AUTO: 86 FL (ref 82–98)
MONOCYTES # BLD AUTO: 0.7 K/UL (ref 0.3–1)
MONOCYTES NFR BLD: 9.4 % (ref 4–15)
NEUTROPHILS # BLD AUTO: 4.6 K/UL (ref 1.8–7.7)
NEUTROPHILS NFR BLD: 63.8 % (ref 38–73)
NRBC BLD-RTO: 0 /100 WBC
PLATELET # BLD AUTO: 179 K/UL (ref 150–450)
PMV BLD AUTO: 10.6 FL (ref 9.2–12.9)
POTASSIUM SERPL-SCNC: 3.9 MMOL/L (ref 3.5–5.1)
RBC # BLD AUTO: 3.45 M/UL (ref 4.6–6.2)
SODIUM SERPL-SCNC: 142 MMOL/L (ref 136–145)
WBC # BLD AUTO: 7.16 K/UL (ref 3.9–12.7)

## 2023-06-19 PROCEDURE — 63600175 PHARM REV CODE 636 W HCPCS: Performed by: NURSE ANESTHETIST, CERTIFIED REGISTERED

## 2023-06-19 PROCEDURE — 93325 TRANSESOPHAGEAL ECHO (TEE) W/ POSSIBLE CARDIOVERSION: ICD-10-PCS | Mod: 26,,, | Performed by: INTERNAL MEDICINE

## 2023-06-19 PROCEDURE — 36415 COLL VENOUS BLD VENIPUNCTURE: CPT | Performed by: INTERNAL MEDICINE

## 2023-06-19 PROCEDURE — 93312 ECHO TRANSESOPHAGEAL: CPT | Mod: 26,,, | Performed by: INTERNAL MEDICINE

## 2023-06-19 PROCEDURE — 93325 DOPPLER ECHO COLOR FLOW MAPG: CPT | Mod: 26,,, | Performed by: INTERNAL MEDICINE

## 2023-06-19 PROCEDURE — 93325 DOPPLER ECHO COLOR FLOW MAPG: CPT

## 2023-06-19 PROCEDURE — D9220A PRA ANESTHESIA: ICD-10-PCS | Mod: ANES,,, | Performed by: ANESTHESIOLOGY

## 2023-06-19 PROCEDURE — 85025 COMPLETE CBC W/AUTO DIFF WBC: CPT | Performed by: INTERNAL MEDICINE

## 2023-06-19 PROCEDURE — D9220A PRA ANESTHESIA: Mod: ANES,,, | Performed by: ANESTHESIOLOGY

## 2023-06-19 PROCEDURE — 93010 EKG 12-LEAD: ICD-10-PCS | Mod: ,,, | Performed by: INTERNAL MEDICINE

## 2023-06-19 PROCEDURE — 37000009 HC ANESTHESIA EA ADD 15 MINS: Performed by: INTERNAL MEDICINE

## 2023-06-19 PROCEDURE — 80048 BASIC METABOLIC PNL TOTAL CA: CPT | Performed by: INTERNAL MEDICINE

## 2023-06-19 PROCEDURE — 93312 TRANSESOPHAGEAL ECHO (TEE) W/ POSSIBLE CARDIOVERSION: ICD-10-PCS | Mod: 26,,, | Performed by: INTERNAL MEDICINE

## 2023-06-19 PROCEDURE — 93320 DOPPLER ECHO COMPLETE: CPT | Mod: 26,,, | Performed by: INTERNAL MEDICINE

## 2023-06-19 PROCEDURE — 93010 ELECTROCARDIOGRAM REPORT: CPT | Mod: ,,, | Performed by: INTERNAL MEDICINE

## 2023-06-19 PROCEDURE — 25000003 PHARM REV CODE 250: Performed by: NURSE ANESTHETIST, CERTIFIED REGISTERED

## 2023-06-19 PROCEDURE — 25000003 PHARM REV CODE 250: Performed by: INTERNAL MEDICINE

## 2023-06-19 PROCEDURE — D9220A PRA ANESTHESIA: ICD-10-PCS | Mod: CRNA,,, | Performed by: NURSE ANESTHETIST, CERTIFIED REGISTERED

## 2023-06-19 PROCEDURE — 37000008 HC ANESTHESIA 1ST 15 MINUTES: Performed by: INTERNAL MEDICINE

## 2023-06-19 PROCEDURE — 92960 TRANSESOPHAGEAL ECHO (TEE) W/ POSSIBLE CARDIOVERSION: ICD-10-PCS | Mod: ,,, | Performed by: INTERNAL MEDICINE

## 2023-06-19 PROCEDURE — D9220A PRA ANESTHESIA: Mod: CRNA,,, | Performed by: NURSE ANESTHETIST, CERTIFIED REGISTERED

## 2023-06-19 PROCEDURE — 92960 CARDIOVERSION ELECTRIC EXT: CPT | Mod: ,,, | Performed by: INTERNAL MEDICINE

## 2023-06-19 PROCEDURE — 93005 ELECTROCARDIOGRAM TRACING: CPT | Mod: 59

## 2023-06-19 PROCEDURE — 93320 PR DOPPLER ECHO HEART,COMPLETE: ICD-10-PCS | Mod: 26,,, | Performed by: INTERNAL MEDICINE

## 2023-06-19 RX ORDER — HYDROMORPHONE HYDROCHLORIDE 2 MG/ML
0.5 INJECTION, SOLUTION INTRAMUSCULAR; INTRAVENOUS; SUBCUTANEOUS EVERY 5 MIN PRN
Status: CANCELLED | OUTPATIENT
Start: 2023-06-19

## 2023-06-19 RX ORDER — ONDANSETRON 2 MG/ML
4 INJECTION INTRAMUSCULAR; INTRAVENOUS DAILY PRN
Status: CANCELLED | OUTPATIENT
Start: 2023-06-19

## 2023-06-19 RX ORDER — DEXMEDETOMIDINE HYDROCHLORIDE 100 UG/ML
INJECTION, SOLUTION INTRAVENOUS
Status: DISCONTINUED | OUTPATIENT
Start: 2023-06-19 | End: 2023-06-19

## 2023-06-19 RX ORDER — AMIODARONE HYDROCHLORIDE 200 MG/1
200 TABLET ORAL 2 TIMES DAILY
Qty: 180 TABLET | Refills: 3 | Status: ON HOLD | OUTPATIENT
Start: 2023-06-19 | End: 2024-02-09 | Stop reason: HOSPADM

## 2023-06-19 RX ORDER — PROPOFOL 10 MG/ML
VIAL (ML) INTRAVENOUS
Status: DISCONTINUED | OUTPATIENT
Start: 2023-06-19 | End: 2023-06-19

## 2023-06-19 RX ORDER — SODIUM CHLORIDE 0.9 % (FLUSH) 0.9 %
10 SYRINGE (ML) INJECTION
Status: CANCELLED | OUTPATIENT
Start: 2023-06-19

## 2023-06-19 RX ORDER — LIDOCAINE HYDROCHLORIDE 20 MG/ML
INJECTION, SOLUTION EPIDURAL; INFILTRATION; INTRACAUDAL; PERINEURAL
Status: DISCONTINUED | OUTPATIENT
Start: 2023-06-19 | End: 2023-06-19

## 2023-06-19 RX ORDER — AMIODARONE HYDROCHLORIDE 200 MG/1
400 TABLET ORAL ONCE
Status: COMPLETED | OUTPATIENT
Start: 2023-06-19 | End: 2023-06-19

## 2023-06-19 RX ADMIN — PROPOFOL 10 MG: 10 INJECTION, EMULSION INTRAVENOUS at 01:06

## 2023-06-19 RX ADMIN — PROPOFOL 20 MG: 10 INJECTION, EMULSION INTRAVENOUS at 01:06

## 2023-06-19 RX ADMIN — DEXMEDETOMIDINE HCL 4 MCG: 100 INJECTION INTRAVENOUS at 01:06

## 2023-06-19 RX ADMIN — GLYCOPYRROLATE 0.2 MG: 0.2 INJECTION, SOLUTION INTRAMUSCULAR; INTRAVITREAL at 01:06

## 2023-06-19 RX ADMIN — SODIUM CHLORIDE: 0.9 INJECTION, SOLUTION INTRAVENOUS at 01:06

## 2023-06-19 RX ADMIN — AMIODARONE HYDROCHLORIDE 400 MG: 200 TABLET ORAL at 02:06

## 2023-06-19 RX ADMIN — LIDOCAINE HYDROCHLORIDE 50 MG: 20 INJECTION, SOLUTION EPIDURAL; INFILTRATION; INTRACAUDAL; PERINEURAL at 01:06

## 2023-06-19 RX ADMIN — PROPOFOL 50 MG: 10 INJECTION, EMULSION INTRAVENOUS at 01:06

## 2023-06-19 NOTE — ANESTHESIA POSTPROCEDURE EVALUATION
Anesthesia Post Evaluation    Patient: Domingo Gross    Procedure(s) Performed: Procedure(s) (LRB):  Transesophageal echo (JOSE) intra-procedure log documentation (N/A)    Final Anesthesia Type: general      Patient location during evaluation: PACU  Patient participation: Yes- Able to Participate  Level of consciousness: awake and alert  Post-procedure vital signs: reviewed and stable  Pain management: adequate  Airway patency: patent    PONV status at discharge: No PONV  Anesthetic complications: no      Cardiovascular status: blood pressure returned to baseline  Respiratory status: unassisted  Hydration status: euvolemic  Follow-up not needed.          Vitals Value Taken Time   /73 06/19/23 1405   Temp 36.8 06/19/23 1413   Pulse 68 06/19/23 1412   Resp 21 06/19/23 1412   SpO2 100 % 06/19/23 1412   Vitals shown include unvalidated device data.      No case tracking events are documented in the log.      Pain/Edmundo Score: Edmundo Score: 10 (6/19/2023  1:30 PM)

## 2023-06-19 NOTE — ANESTHESIA PREPROCEDURE EVALUATION
06/19/2023  Domingo Gross is a 63 y.o., male.      Pre-op Assessment    I have reviewed the NPO Status.   I have reviewed the Medications.     Review of Systems  Anesthesia Hx:  No problems with previous Anesthesia  Denies Family Hx of Anesthesia complications.   Denies Personal Hx of Anesthesia complications.   Hematology/Oncology:     Oncology Normal    -- Anemia:   EENT/Dental:EENT/Dental Normal   Cardiovascular:   Hypertension Past MI CAD  CABG/stent Dysrhythmias atrial fibrillation PVD hyperlipidemia EF: 50%   Pulmonary:   COPD    Renal/:   Chronic Renal Disease, CKD    Hepatic/GI:  Hepatic/GI Normal    Musculoskeletal:   Arthritis   Spine Disorders: Disc disease and Degenerative disease    Neurological:   Neuromuscular Disease,    Endocrine:   Hypothyroidism    Dermatological:  Skin Normal    Psych:  Psychiatric Normal           Physical Exam  General: Alert and Oriented    Airway:  Mallampati: II   Mouth Opening: Normal  TM Distance: Normal  Tongue: Normal  Neck ROM: Normal ROM    Chest/Lungs:  Clear to auscultation, Normal Respiratory Rate    Heart:  Rate: Normal  Rhythm: Regular Rhythm  Sounds: Normal        Anesthesia Plan  Type of Anesthesia, risks & benefits discussed:    Anesthesia Type: Gen Natural Airway  Intra-op Monitoring Plan: Standard ASA Monitors and Art Line  Post Op Pain Control Plan: multimodal analgesia and IV/PO Opioids PRN  Induction:  IV  Airway Plan: Direct and Video  Informed Consent: Informed consent signed with the Patient and all parties understand the risks and agree with anesthesia plan.  All questions answered.   ASA Score: 4  Day of Surgery Review of History & Physical: H&P Update referred to the surgeon/provider.    Ready For Surgery From Anesthesia Perspective.     .

## 2023-06-19 NOTE — H&P
Patient ID:  Domingo Gross is a 62 y.o. male who presents for follow up of Coronary Artery Disease, Hypertension, Hyperlipidemia, Atrial Fibrillation, and Abnormal Stress Test           HPI:               The patient location is: home     Visit type: Virtual visit with synchronous audio and video  Total time spent with patient: 35 minutes of chart review, discussion, medications update, and charting.   Each patient to whom we provide medical services by telemedicine is:  (1) informed of the relationship between the physician and patient and the respective role of any other health care provider with respect to management of the patient; and (2) notified that he or she may decline to receive medical services by telemedicine and may withdraw from such care at any time.                 Domingo Gross 62 y.o. male is here follow up and complaining of chest discomfort.  He is taken nitroglycerin at least once.  Worse pain 5/10.  He is compliant with his medications.  He is tolerating Eliquis well.           He has a history CAD, HTN, HLP, PAF in NSR, edema, CKD, anemia related to CKD, and PAD with winsome IIb claudication. He is s/p bilateral SFA, GRUPO, EIA, and R CFA revascularization. His peripheral vascular intervention in 12/2021 was cancelled due to severe anemia with a H/H that required a transfusion with subsequent ongoing intervention by heme/onc. He has a GI work up for anemia was normal. His H/H has improved with heme/onc care.          He was admitted 10/4/2019 for cardiac arrest. He was fully rescued. Initial rhythm was afib with rvr. He was not taken immediately to the cath lab because of ARF + hypokalemia. He had a diagnostic angiogram which revealed patent LM, LAD, RCA stent, and new ostial LCX lesion 99% with R to L collaterals. He had PCI with REZA after his renal function returned to baseline.            8/19/2021 with CP HTN urgency with . Added nifedipine and BP has been stable. No  CP                 ELISABETH with stress 5/2017:              R 1.01 to 0.44              L 0.92 to 0.45               After 6 minutes ambulation        CTA 5/2017:                    Patent distal aorta and bilateral GRUPO/EIA stents              L EIA with de shawna 90% stenosis              L SFA 80% with 3 vessel run off                 R EIA/CFA with moderate disease              R SFA 80% stenosis with 3 vessel run off           Peripheral angiogram with intervention 6/2017         Patent bilateral GRUPO/EIA stents.    De shawna 80% left EIA stenosis treated with 9.0 x 80 mm Lifestar  stent post dilated with 8.0 mm balloon.    Bilateral 70-80% SFA stenosis with 3 vessel run off.           3/2018        L SFA intervention for claudication              75% L SFA with 3 vessel run off              7 mmHg resting and 33 mmHg hyperemic mean gradient                                L CFA antegrade access              PTA with 6.0 x 150 mm              PTA with 6.0 x 150 mm Lutonix              3 vessel run off               4/13/2018                    S/p R SFA PTA for winsome IIb claudication              R CFA antegrade access                    R CFA with 50% stenosis-heavily calcified lesion                          Baseline /90                    R SFA with 70% stenosis with a significant resting and hyperemic mean gradient                3 vessel run off                        PTA of R SFA with 6.0 x 150 + 6.0 x 60 mm Lutonix DCB           5/2/2018              Patent arteries post revascularization           2/2019                 R 0.84 to 0.27              L 0.90 to 0.66              After ambulation              Severe R calf claudication              Nuclear stress test 2/2019                 + ECG with 2 mm ST depression              No wall motion abnormalities              Test stopped at 6 minutes, 7 METs, 86% predicted heart rate              Stopped because of R leg claudication + ECG changes               S/p PCI RCA and LAD with REZA 3/2019                    RCA 3.5 x 22 mm Resolute REZA              LAD 2.5 x 30 and 2.5 x 25 mm Resolute REZA        Peripheral aortogram 5/10/2019                 R CFA 95% stenosis              3 vessel run off           Seen by Dr. Giron for R CFA endarterectomy but preferable should be off plavix for at least 5 days. He has a risk for stent thrombosis with premature interruption of DAPT. He later had R CFA atherectomy + PTA with DCB.               10/11/2019 for cardiac arrest         S/p staged LCX PCI                    L CFA access              IVUS guided PCI              3.0 x 15 mm Resolute REZA post dilated with 3.5 NC balloon            PET stress 2/2020                 No ischemia        Echo 2/2021                            EF 55%              Normal diastolic fn              Normal RV fn              Mild MR/TR/GA                      Arterial US 7/2021                 Bilateral SFA disease > 70        Venous US 7/2021                 No DVT              No superficial reflux              R POP vein reflux > 500 msec     Echo 8/2021                            Normal EF              Normal RV fn              Mild-mod MR              Mod GA              PASP 26 mmHg           ELISABETH 11/2021                  Resting ELISABETH right 0.84 and left 0.83              Exercise ELISABETH right 0.39 and left 0.32 after 3.5 minutes ambulation              TBI right 0.58 and left 0.48              US 11/2021                            Bilateral mid SFA high grade disease        S/p PTA of L CFA + SFA with atherectomy + DCB (5/2022)         S/p PTA of R CFA with atherectomy + DCB (8/2022)        Echo 11/2022                 Nl EF              Normal LA              Mild MR/TR/GA                      Nuc stress test 3/2023        ECG portion of the study is positive for ischemia.   Specificity is reduced secondary to resting ST abnormalities.  Reversible ischemia along the inferior wall, near the  base of LV with hypokinesis.  Dilated left ventricle.  Decreased ejection fraction estimated to be 34% during stress and 48% during rest.           Patient Active Problem List     Diagnosis Date Noted    Cardiac arrest 10/04/2019       Priority: High    Atherosclerosis of native artery of both lower extremities with intermittent claudication 03/02/2018       Priority: High    Hypothyroidism 03/02/2023    Unstable angina 03/02/2023       I have messaged his cardiologist and electrophysiologist about this. Currently awaiting response        Persistent atrial fibrillation 11/12/2022    Anemia due to stage 4 chronic kidney disease 12/15/2021    CKD (chronic kidney disease) stage 4, GFR 15-29 ml/min 08/20/2021    COPD (chronic obstructive pulmonary disease) 08/20/2021    Nuclear sclerosis, bilateral 06/08/2020    Nephrotic range proteinuria 04/06/2020    Hypokalemia 10/04/2019    Bilateral carotid artery stenosis      Coronary artery disease involving native coronary artery of native heart with angina pectoris with documented spasm 03/29/2019             3/29/2019     S/p LHC via R radial           LM, LAD, and LCX are patent with luminal irregularities  RCA mid 95% calcified lesion  Normal EF with LVEDP 8 mmHg           PCI of mid LAD with 2.5 x 30 and 2.5 x 15 mm Resolute REZA  PCI of proximal RCA to treat guide dissection with 3.5 x 22 Resolute REZA           10/11/2019 for cardiac arrest         S/p staged LCX PCI                    L CFA access              IVUS guided PCI              3.0 x 15 mm Resolute REZA post dilated with 3.5 NC balloon              Abnormal cardiovascular stress test 02/27/2019             Nuclear stress test 2/2019                 + ECG with 2 mm ST depression              No wall motion abnormalities              Test stopped at 6 minutes, 7 METs, 86% predicted heart rate              Stopped because of R leg claudication + ECG changes                Venous insufficiency of both lower  extremities 06/04/2018    Hyperlipidemia 06/12/2015    DJD (degenerative joint disease), lumbar 05/27/2015    DDD (degenerative disc disease) 05/27/2015    Claudication in peripheral vascular disease 06/13/2014    HTN (hypertension) 04/29/2013                           LAST HbA1c        Lab Results   Component Value Date     HGBA1C 5.2 02/27/2023         Lipid panel        Lab Results   Component Value Date     CHOL 144 02/27/2023     CHOL 116 (L) 09/02/2022     CHOL 105 (L) 08/20/2021            Lab Results   Component Value Date     HDL 55 02/27/2023     HDL 50 09/02/2022     HDL 44 08/20/2021            Lab Results   Component Value Date     LDLCALC 75.8 02/27/2023     LDLCALC 41.6 (L) 09/02/2022     LDLCALC 51.0 (L) 08/20/2021            Lab Results   Component Value Date     TRIG 66 02/27/2023     TRIG 122 09/02/2022     TRIG 50 08/20/2021            Lab Results   Component Value Date     CHOLHDL 38.2 02/27/2023     CHOLHDL 43.1 09/02/2022     CHOLHDL 41.9 08/20/2021               Review of Systems   Constitutional: Negative for diaphoresis, night sweats, weight gain and weight loss.   HENT:  Negative for congestion.    Eyes:  Negative for blurred vision, discharge and double vision.   Cardiovascular:  Negative for chest pain, claudication, cyanosis, dyspnea on exertion, irregular heartbeat, leg swelling, near-syncope, orthopnea, palpitations, paroxysmal nocturnal dyspnea and syncope.   Respiratory:  Negative for cough, shortness of breath and wheezing.    Endocrine: Negative for cold intolerance, heat intolerance and polyphagia.   Hematologic/Lymphatic: Negative for adenopathy and bleeding problem. Does not bruise/bleed easily.   Skin:  Negative for dry skin and nail changes.   Musculoskeletal:  Negative for arthritis, back pain, falls, joint pain, myalgias and neck pain.   Gastrointestinal:  Negative for bloating, abdominal pain, change in bowel habit and constipation.   Genitourinary:  Negative for bladder  incontinence, dysuria, flank pain, genital sores and missed menses.   Neurological:  Negative for aphonia, brief paralysis, difficulty with concentration, dizziness and weakness.   Psychiatric/Behavioral:  Negative for altered mental status and memory loss. The patient does not have insomnia.    Allergic/Immunologic: Negative for environmental allergies.      Objective:   Physical Exam  Constitutional:       Appearance: He is well-developed.      Interventions: He is not intubated.  HENT:      Head: Normocephalic and atraumatic.      Right Ear: External ear normal.      Left Ear: External ear normal.   Eyes:      General: No scleral icterus.        Right eye: No discharge.         Left eye: No discharge.      Conjunctiva/sclera: Conjunctivae normal.      Pupils: Pupils are equal, round, and reactive to light.   Neck:      Thyroid: No thyromegaly.      Vascular: Normal carotid pulses. No carotid bruit, hepatojugular reflux or JVD.      Trachea: No tracheal deviation.   Cardiovascular:      Rate and Rhythm: Normal rate and regular rhythm. No extrasystoles are present.     Chest Wall: PMI is not displaced.      Pulses:           Carotid pulses are 2+ on the right side and 2+ on the left side.       Radial pulses are 2+ on the right side and 2+ on the left side.        Femoral pulses are 2+ on the right side and 2+ on the left side.       Popliteal pulses are 2+ on the right side and 2+ on the left side.        Dorsalis pedis pulses are 1+ on the right side and 2+ on the left side.        Posterior tibial pulses are 1+ on the right side and 2+ on the left side.      Heart sounds: S1 normal and S2 normal. Heart sounds not distant. No midsystolic click. No murmur heard.    No friction rub. No gallop. No S3 sounds.      Comments:         Biphasic R DP and weak but biphasic R PT doppler signals         Biphasic L DP and PT doppler signals              Pulmonary:      Effort: Pulmonary effort is normal. No tachypnea,  bradypnea, accessory muscle usage or respiratory distress. He is not intubated.      Breath sounds: Normal breath sounds. No stridor. No decreased breath sounds, wheezing or rales.   Chest:      Chest wall: No tenderness.   Abdominal:      General: There is no distension or abdominal bruit.      Palpations: There is no mass or pulsatile mass.      Tenderness: There is no abdominal tenderness. There is no guarding or rebound.   Musculoskeletal:         General: No tenderness. Normal range of motion.      Cervical back: Normal range of motion and neck supple.   Lymphadenopathy:      Cervical: No cervical adenopathy.   Skin:     General: Skin is warm.      Coloration: Skin is not pale.      Findings: No erythema or rash.   Neurological:      Mental Status: He is alert and oriented to person, place, and time.      Cranial Nerves: No cranial nerve deficit.      Coordination: Coordination normal.      Deep Tendon Reflexes: Reflexes are normal and symmetric.   Psychiatric:         Behavior: Behavior normal.         Thought Content: Thought content normal.         Judgment: Judgment normal.            Assessment:      1. Coronary artery disease involving native coronary artery of native heart with angina pectoris with documented spasm    2. Atherosclerosis of native artery of both lower extremities with intermittent claudication    3. Mixed hyperlipidemia    4. Bilateral carotid artery stenosis    5. Claudication in peripheral vascular disease    6. CKD (chronic kidney disease) stage 4, GFR 15-29 ml/min    7. Abnormal cardiovascular stress test    8. Venous insufficiency of both lower extremities    9. Anemia due to stage 4 chronic kidney disease          Plan:                  He had an abnormal stress test he will proceed with coronary angiography plus minus intervention.  Imdur 30 mg was added to his medications.         R radial access  REZA candidate              He was instructed to go to the emergency room he has any  recurrent discomfort without resolution.  He expressed verbal understanding of the plan.  All his questions were answered to his satisfaction.     Medical therapy for CAD  DAPT with aspirin + plavix  Intense statin therapy  Arb  Pletal         Target LDL < 70  Low salt diet  Weight loss  Target BP < 130/80 mmHg              Continue with current medical plan and lifestyle changes.  Return sooner for concerns or questions. If symptoms persist go to the ED  I have reviewed all pertinent data on this patient               Follow up as scheduled. Return sooner for concerns or questions  He expressed verbal understanding and agreed with the plan           Greater than 50% of the visit of 45 minutes was spent counseling, educating, and coordinating the care of the patient.        -In today's visit, at least 4 established conditions that pose a risk to life or bodily function have been addressed and the conditions are severe.     -In today's visit, monitoring for drug toxicity was accomplished.           Follow up as scheduled. Return sooner for concerns or questions  I have reviewed the patient's medical history in detail and updated the computerized patient record.     No orders of the defined types were placed in this encounter.          Patient's Medications   New Prescriptions     No medications on file   Previous Medications     ALBUTEROL (VENTOLIN HFA) 90 MCG/ACTUATION INHALER    Inhale 2 puffs into the lungs every 6 (six) hours as needed for Wheezing. Rescue     APIXABAN (ELIQUIS) 5 MG TAB    Take 1 tablet (5 mg total) by mouth 2 (two) times daily.     ASPIRIN (ASPIR-81 ORAL)    Take 81 mg by mouth once daily.     CARVEDILOL (COREG) 25 MG TABLET    Take 1 tablet (25 mg total) by mouth 2 (two) times daily with meals.     COENZYME Q10 100 MG CAPSULE    Take 100 mg by mouth once daily.     EPLERENONE (INSPRA) 50 MG TAB    Take 1 tablet (50 mg total) by mouth once daily.     FUROSEMIDE (LASIX) 40 MG TABLET    Take 1  tablet (40 mg total) by mouth once daily.     ISOSORBIDE MONONITRATE (IMDUR) 30 MG 24 HR TABLET    Take 1 tablet (30 mg total) by mouth once daily.     LEVOTHYROXINE (SYNTHROID) 25 MCG TABLET    Take one tablet PO every morning on an empty stomach and wait at least 1 hour before eating or taking other medications     NITROGLYCERIN (NITROSTAT) 0.4 MG SL TABLET    Place 1 tablet (0.4 mg total) under the tongue every 5 (five) minutes as needed for Chest pain.     ROSUVASTATIN (CRESTOR) 40 MG TAB    TAKE 1 TABLET(40 MG) BY MOUTH EVERY EVENING   Modified Medications     No medications on file   Discontinued Medications     No medications on file                 He is here for JOSE +/- DCCV for afib   Post DCCV initiation of amiodarone

## 2023-06-19 NOTE — TRANSFER OF CARE
"Anesthesia Transfer of Care Note    Patient: Domingo Gross    Procedure(s) Performed: Procedure(s) (LRB):  Transesophageal echo (JOSE) intra-procedure log documentation (N/A)    Patient location: Cath Lab    Anesthesia Type: general    Transport from OR: Transported from OR on 6-10 L/min O2 by face mask with adequate spontaneous ventilation    Post pain: adequate analgesia    Post assessment: no apparent anesthetic complications    Post vital signs: stable    Level of consciousness: awake    Nausea/Vomiting: no nausea/vomiting    Complications: none    Transfer of care protocol was followed      Last vitals:   Visit Vitals  BP (!) 173/91   Pulse 73   Temp 36.6 °C (97.8 °F) (Temporal)   Resp 18   Ht 5' 10" (1.778 m)   Wt 81.6 kg (180 lb)   SpO2 98%   BMI 25.83 kg/m²     "

## 2023-06-19 NOTE — DISCHARGE INSTRUCTIONS
Discharge Instructions:    Do not drive a car, operate heavy equipment, care for a young child, etc for the next 12-24 hours.   Avoid drinking alcohol for 24 hours.  Do not make any important decisions for 24 hours.    Drink fluids to keep hydrated. Resume your usual diet as tolerated.     Rest for today then activity as tolerated.   Do not lift anything over 5 pounds for the first 3 days after procedure.    Remove dressing tomorrow then may shower with warm soapy water. Do not scrub site. Pat dry.   May apply bandaid for 2 days.  No tub baths.  Do not submerge wound in water for 3 days.     Call MD for any unrelieved pain, excessive nausea or vomiting, redness around site, bleeding, or pus or foul smelling drainage, or any other questions or concerns.    Go to the ER for any difficulty breathing or chest pain.      If site swells or bleeds, hold direct pressure to area for 10 full minutes.   If site continues to bleed, continue to hold pressure to site and have someone bring you to the ER.      Follow any additional instructions given to you by MD.      Call MD to schedule a follow up appointment, or follow up as instructed.   .

## 2023-06-19 NOTE — DISCHARGE SUMMARY
[facility] [place] - Cath Lab (Hospital)  Discharge Note  Short Stay    Procedure(s) (LRB):  Transesophageal echo (JOSE) intra-procedure log documentation (N/A)      OUTCOME: Patient tolerated treatment/procedure well without complication and is now ready for discharge.    DISPOSITION: Home or Self Care    FINAL DIAGNOSIS:  Persistent atrial fibrillation    FOLLOWUP: In clinic    DISCHARGE INSTRUCTIONS:    Discharge Procedure Orders   CBC W/ AUTO DIFFERENTIAL   Standing Status: Future Standing Exp. Date: 08/12/24     BASIC METABOLIC PANEL   Standing Status: Future Standing Exp. Date: 08/12/24          S/p JOSE + successful DCCV       Current Discharge Medication List        START taking these medications    Details   amiodarone (PACERONE) 200 MG Tab Take 1 tablet (200 mg total) by mouth 2 (two) times daily.  Qty: 180 tablet, Refills: 3    Comments: Take 400 mg twice a day for 2 weeks then 200 mg twice a day for 2 weeks then 200 mg daily           CONTINUE these medications which have NOT CHANGED    Details   apixaban (ELIQUIS) 5 mg Tab Take 1 tablet (5 mg total) by mouth 2 (two) times daily.  Qty: 60 tablet, Refills: 11    Associated Diagnoses: Paroxysmal atrial fibrillation      ASPIRIN (ASPIR-81 ORAL) Take 81 mg by mouth once daily.      carvediloL (COREG) 25 MG tablet Take 1 tablet (25 mg total) by mouth 2 (two) times daily with meals.  Qty: 60 tablet, Refills: 11    Comments: .      clopidogreL (PLAVIX) 75 mg tablet Take 1 tablet (75 mg total) by mouth once daily.  Qty: 90 tablet, Refills: 3      eplerenone (INSPRA) 50 MG Tab Take 1 tablet (50 mg total) by mouth once daily.  Qty: 90 tablet, Refills: 3    Comments: Replacement for spironolactone. Thanks!  Associated Diagnoses: Essential hypertension      isosorbide mononitrate (IMDUR) 60 MG 24 hr tablet Take 1 tablet (60 mg total) by mouth once daily.  Qty: 90 tablet, Refills: 3      levothyroxine (SYNTHROID) 50 MCG tablet Take one tablet PO every morning on an empty stomach  and wait at least 1 hour before eating or taking other medications  Qty: 30 tablet, Refills: 11    Associated Diagnoses: Hypothyroidism, unspecified type      ranolazine (RANEXA) 500 MG Tb12 Take 1 tablet (500 mg total) by mouth 2 (two) times daily.  Qty: 180 tablet, Refills: 3      rosuvastatin (CRESTOR) 40 MG Tab TAKE 1 TABLET(40 MG) BY MOUTH EVERY EVENING  Qty: 90 tablet, Refills: 3    Associated Diagnoses: Mixed hyperlipidemia      albuterol (VENTOLIN HFA) 90 mcg/actuation inhaler Inhale 2 puffs into the lungs every 6 (six) hours as needed for Wheezing. Rescue  Qty: 18 g, Refills: 0      coenzyme Q10 100 mg capsule Take 100 mg by mouth once daily.      nitroGLYCERIN (NITROSTAT) 0.4 MG SL tablet Place 1 tablet (0.4 mg total) under the tongue every 5 (five) minutes as needed for Chest pain.  Qty: 20 tablet, Refills: 12              TIME SPENT ON DISCHARGE: 35 minutes

## 2023-06-19 NOTE — PLAN OF CARE
Pt remains in cath lab pre/post JOSE, cardioversion complete.  Pt with NSR post cardioversion.  VSS.  NAD noted.  AAO X3.  Lightly sedated/drowsy. Updated pt spouse.

## 2023-06-20 ENCOUNTER — PATIENT MESSAGE (OUTPATIENT)
Dept: CARDIOLOGY | Facility: CLINIC | Age: 63
End: 2023-06-20
Payer: COMMERCIAL

## 2023-06-20 VITALS
HEIGHT: 70 IN | TEMPERATURE: 98 F | DIASTOLIC BLOOD PRESSURE: 55 MMHG | RESPIRATION RATE: 15 BRPM | WEIGHT: 180 LBS | OXYGEN SATURATION: 99 % | BODY MASS INDEX: 25.77 KG/M2 | HEART RATE: 60 BPM | SYSTOLIC BLOOD PRESSURE: 118 MMHG

## 2023-06-20 NOTE — TELEPHONE ENCOUNTER
Spoke with patient who states that he was concerned after reading the medication insert on Amiodarone, due to his hx of thyroid issues. Patient reassured that he will be monitoring with labs while taking the Amiodarone to ensure that his thyroid and liver function remain within a normal range. Patient verbalized understanding.

## 2023-06-21 LAB
AORTIC ROOT ANNULUS: 3 CM
BSA FOR ECHO PROCEDURE: 2.01 M2
EJECTION FRACTION: 55 %

## 2023-06-22 DIAGNOSIS — I10 ESSENTIAL HYPERTENSION: ICD-10-CM

## 2023-06-27 NOTE — TELEPHONE ENCOUNTER
----- Message from Courtney Byers sent at 7/10/2017  9:26 AM CDT -----  Contact: [phone] / self   A refill request was sent to the [place] office by mistake. Patient is requesting a refill on his clopidogrel (PLAVIX) 75 mg tablet sent to [facility] pharmacy at [address] Phone number is [phone], fax number is [phone]. Please advise.  
Noted.  Authorization pending.  
[employer]

## 2023-06-30 DIAGNOSIS — I10 ESSENTIAL HYPERTENSION: ICD-10-CM

## 2023-06-30 RX ORDER — EPLERENONE 50 MG/1
TABLET, FILM COATED ORAL
Qty: 90 TABLET | Refills: 3 | Status: SHIPPED | OUTPATIENT
Start: 2023-06-30 | End: 2023-06-30 | Stop reason: SDUPTHER

## 2023-07-03 ENCOUNTER — TELEPHONE (OUTPATIENT)
Dept: ELECTROPHYSIOLOGY | Facility: CLINIC | Age: 63
End: 2023-07-03
Payer: COMMERCIAL

## 2023-07-03 ENCOUNTER — PATIENT MESSAGE (OUTPATIENT)
Dept: CARDIOLOGY | Facility: CLINIC | Age: 63
End: 2023-07-03
Payer: COMMERCIAL

## 2023-07-05 RX ORDER — EPLERENONE 50 MG/1
50 TABLET, FILM COATED ORAL DAILY
Qty: 90 TABLET | Refills: 3 | Status: SHIPPED | OUTPATIENT
Start: 2023-07-05 | End: 2023-08-02 | Stop reason: SDUPTHER

## 2023-07-13 DIAGNOSIS — I10 ESSENTIAL HYPERTENSION: ICD-10-CM

## 2023-07-13 RX ORDER — EPLERENONE 50 MG/1
50 TABLET, FILM COATED ORAL DAILY
Qty: 90 TABLET | Refills: 3 | Status: CANCELLED | OUTPATIENT
Start: 2023-07-13

## 2023-07-28 ENCOUNTER — PATIENT MESSAGE (OUTPATIENT)
Dept: CARDIOLOGY | Facility: CLINIC | Age: 63
End: 2023-07-28
Payer: COMMERCIAL

## 2023-08-02 ENCOUNTER — PATIENT MESSAGE (OUTPATIENT)
Dept: CARDIOLOGY | Facility: CLINIC | Age: 63
End: 2023-08-02

## 2023-08-02 ENCOUNTER — OFFICE VISIT (OUTPATIENT)
Dept: CARDIOLOGY | Facility: CLINIC | Age: 63
End: 2023-08-02
Payer: COMMERCIAL

## 2023-08-02 VITALS
HEIGHT: 70 IN | BODY MASS INDEX: 26.48 KG/M2 | SYSTOLIC BLOOD PRESSURE: 130 MMHG | DIASTOLIC BLOOD PRESSURE: 60 MMHG | OXYGEN SATURATION: 99 % | WEIGHT: 185 LBS | HEART RATE: 57 BPM

## 2023-08-02 DIAGNOSIS — D63.1 ANEMIA DUE TO STAGE 4 CHRONIC KIDNEY DISEASE: ICD-10-CM

## 2023-08-02 DIAGNOSIS — E78.2 MIXED HYPERLIPIDEMIA: Chronic | ICD-10-CM

## 2023-08-02 DIAGNOSIS — I87.2 VENOUS INSUFFICIENCY OF BOTH LOWER EXTREMITIES: ICD-10-CM

## 2023-08-02 DIAGNOSIS — I25.111 CORONARY ARTERY DISEASE INVOLVING NATIVE CORONARY ARTERY OF NATIVE HEART WITH ANGINA PECTORIS WITH DOCUMENTED SPASM: Primary | ICD-10-CM

## 2023-08-02 DIAGNOSIS — I10 ESSENTIAL HYPERTENSION: ICD-10-CM

## 2023-08-02 DIAGNOSIS — I10 PRIMARY HYPERTENSION: ICD-10-CM

## 2023-08-02 DIAGNOSIS — N18.4 CKD (CHRONIC KIDNEY DISEASE) STAGE 4, GFR 15-29 ML/MIN: ICD-10-CM

## 2023-08-02 DIAGNOSIS — N18.4 ANEMIA DUE TO STAGE 4 CHRONIC KIDNEY DISEASE: ICD-10-CM

## 2023-08-02 DIAGNOSIS — I48.0 PAROXYSMAL ATRIAL FIBRILLATION: ICD-10-CM

## 2023-08-02 DIAGNOSIS — I48.19 PERSISTENT ATRIAL FIBRILLATION: ICD-10-CM

## 2023-08-02 PROCEDURE — 3044F HG A1C LEVEL LT 7.0%: CPT | Mod: CPTII,S$GLB,, | Performed by: INTERNAL MEDICINE

## 2023-08-02 PROCEDURE — 99215 OFFICE O/P EST HI 40 MIN: CPT | Mod: S$GLB,,, | Performed by: INTERNAL MEDICINE

## 2023-08-02 PROCEDURE — 3008F PR BODY MASS INDEX (BMI) DOCUMENTED: ICD-10-PCS | Mod: CPTII,S$GLB,, | Performed by: INTERNAL MEDICINE

## 2023-08-02 PROCEDURE — 99999 PR PBB SHADOW E&M-EST. PATIENT-LVL IV: CPT | Mod: PBBFAC,,, | Performed by: INTERNAL MEDICINE

## 2023-08-02 PROCEDURE — 99999 PR PBB SHADOW E&M-EST. PATIENT-LVL IV: ICD-10-PCS | Mod: PBBFAC,,, | Performed by: INTERNAL MEDICINE

## 2023-08-02 PROCEDURE — 3078F PR MOST RECENT DIASTOLIC BLOOD PRESSURE < 80 MM HG: ICD-10-PCS | Mod: CPTII,S$GLB,, | Performed by: INTERNAL MEDICINE

## 2023-08-02 PROCEDURE — 3075F PR MOST RECENT SYSTOLIC BLOOD PRESS GE 130-139MM HG: ICD-10-PCS | Mod: CPTII,S$GLB,, | Performed by: INTERNAL MEDICINE

## 2023-08-02 PROCEDURE — 3078F DIAST BP <80 MM HG: CPT | Mod: CPTII,S$GLB,, | Performed by: INTERNAL MEDICINE

## 2023-08-02 PROCEDURE — 3075F SYST BP GE 130 - 139MM HG: CPT | Mod: CPTII,S$GLB,, | Performed by: INTERNAL MEDICINE

## 2023-08-02 PROCEDURE — 3008F BODY MASS INDEX DOCD: CPT | Mod: CPTII,S$GLB,, | Performed by: INTERNAL MEDICINE

## 2023-08-02 PROCEDURE — 3044F PR MOST RECENT HEMOGLOBIN A1C LEVEL <7.0%: ICD-10-PCS | Mod: CPTII,S$GLB,, | Performed by: INTERNAL MEDICINE

## 2023-08-02 PROCEDURE — 99215 PR OFFICE/OUTPT VISIT, EST, LEVL V, 40-54 MIN: ICD-10-PCS | Mod: S$GLB,,, | Performed by: INTERNAL MEDICINE

## 2023-08-02 RX ORDER — NITROGLYCERIN 0.4 MG/1
0.4 TABLET SUBLINGUAL EVERY 5 MIN PRN
Qty: 20 TABLET | Refills: 12 | Status: SHIPPED | OUTPATIENT
Start: 2023-08-02 | End: 2024-01-27 | Stop reason: SDUPTHER

## 2023-08-04 RX ORDER — EPLERENONE 50 MG/1
50 TABLET, FILM COATED ORAL DAILY
Qty: 90 TABLET | Refills: 3 | Status: SHIPPED | OUTPATIENT
Start: 2023-08-04 | End: 2024-03-01 | Stop reason: SDUPTHER

## 2023-08-07 ENCOUNTER — TELEPHONE (OUTPATIENT)
Dept: CARDIOLOGY | Facility: CLINIC | Age: 63
End: 2023-08-07
Payer: COMMERCIAL

## 2023-08-07 NOTE — TELEPHONE ENCOUNTER
I put the Eliquis  5 mg twice daily ,  back on your medication List.           Sincerely   Nw    This Patient Portal message has not been read.            Domingo KYLE Staff (supporting Keo Jenkins MD) 6 hours ago (9:06 AM)      You need to put it back on my chart  because you took it off      MD Domingo Ochoa 3 days ago      Thanks     Stay on eliquis. That is for atrial fibrillation. You hold aspirin while you are still taking plavix.     Savannah Portillo MA routed conversation to Keo Jenkins MD 5 days ago    Domingo KYLE Staff (supporting Keo Jenkins MD) 5 days ago      Do you want me to stop taking eliquis and aspron      Domingo KYLE Staff (supporting Keo Jenkins MD) 5 days ago      You want me to stop taking  the eliquis and aspran

## 2023-08-24 PROBLEM — I48.0 PAROXYSMAL ATRIAL FIBRILLATION: Status: ACTIVE | Noted: 2023-08-24

## 2023-08-25 ENCOUNTER — PATIENT MESSAGE (OUTPATIENT)
Dept: INTERNAL MEDICINE | Facility: CLINIC | Age: 63
End: 2023-08-25
Payer: COMMERCIAL

## 2023-08-25 NOTE — PROGRESS NOTES
Subjective:   Patient ID:  Domingo Gross is a 63 y.o. male who presents for follow up of Coronary Artery Disease, Atrial Fibrillation, Hyperlipidemia, Hypertension, and Peripheral Arterial Disease        HPI:              Domingo Gross 63 y.o. male is here follow up and complaining of chest discomfort.          He has a history CAD, HTN, HLP, PAF in NSR, edema, CKD, anemia related to CKD, and PAD with winsome IIb claudication. He is s/p bilateral SFA, GRUPO, EIA, and R CFA revascularization. His peripheral vascular intervention in 12/2021 was cancelled due to severe anemia with a H/H that required a transfusion with subsequent ongoing intervention by heme/onc. He has a GI work up for anemia was normal. His H/H has improved with heme/onc care.        He was admitted 10/4/2019 for cardiac arrest. He was fully rescued. Initial rhythm was afib with rvr. He was not taken immediately to the cath lab because of ARF + hypokalemia. He had a diagnostic angiogram which revealed patent LM, LAD, RCA stent, and new ostial LCX lesion 99% with R to L collaterals. He had PCI with REZA after his renal function returned to baseline.         8/19/2021 with CP HTN urgency with . Added nifedipine and BP has been stable. No CP            ELISABETH with stress 5/2017:   R 1.01 to 0.44   L 0.92 to 0.45    After 6 minutes ambulation      CTA 5/2017:       Patent distal aorta and bilateral GRUPO/EIA stents   L EIA with de shawna 90% stenosis   L SFA 80% with 3 vessel run off     R EIA/CFA with moderate disease   R SFA 80% stenosis with 3 vessel run off        Peripheral angiogram with intervention 6/2017         Patent bilateral GRUPO/EIA stents.    De shawna 80% left EIA stenosis treated with 9.0 x 80 mm Lifestar  stent post dilated with 8.0 mm balloon.    Bilateral 70-80% SFA stenosis with 3 vessel run off.        3/2018      L SFA intervention for claudication   75% L SFA with 3 vessel run off   7 mmHg resting and 33 mmHg hyperemic mean  gradient         L CFA antegrade access   PTA with 6.0 x 150 mm   PTA with 6.0 x 150 mm Lutonix   3 vessel run off           4/13/2018       S/p R SFA PTA for winsome IIb claudication   R CFA antegrade access         R CFA with 50% stenosis-heavily calcified lesion               Baseline /90         R SFA with 70% stenosis with a significant resting and hyperemic mean gradient     3 vessel run off             PTA of R SFA with 6.0 x 150 + 6.0 x 60 mm Lutonix DCB        5/2/2018   Patent arteries post revascularization        2/2019     R 0.84 to 0.27   L 0.90 to 0.66   After ambulation   Severe R calf claudication          Nuclear stress test 2/2019     + ECG with 2 mm ST depression   No wall motion abnormalities   Test stopped at 6 minutes, 7 METs, 86% predicted heart rate   Stopped because of R leg claudication + ECG changes          S/p PCI RCA and LAD with REZA 3/2019       RCA 3.5 x 22 mm Resolute REZA   LAD 2.5 x 30 and 2.5 x 25 mm Resolute REZA      Peripheral aortogram 5/10/2019     R CFA 95% stenosis   3 vessel run off        Seen by Dr. Giron for R CFA endarterectomy but preferable should be off plavix for at least 5 days. He has a risk for stent thrombosis with premature interruption of DAPT. He later had R CFA atherectomy + PTA with DCB.           10/11/2019 for cardiac arrest        S/p staged LCX PCI                    L CFA access              IVUS guided PCI              3.0 x 15 mm Resolute REZA post dilated with 3.5 NC balloon         PET stress 2/2020     No ischemia      Echo 2/2021      EF 55%   Normal diastolic fn   Normal RV fn   Mild MR/TR/ND         Arterial US 7/2021     Bilateral SFA disease > 70      Venous US 7/2021     No DVT   No superficial reflux   R POP vein reflux > 500 msec    Echo 8/2021      Normal EF   Normal RV fn   Mild-mod MR   Mod ND   PASP 26 mmHg        ELISABETH 11/2021      Resting ELISABETH right 0.84 and left 0.83   Exercise ELISABETH right 0.39 and left 0.32 after 3.5 minutes  ambulation   TBI right 0.58 and left 0.48          US 11/2021      Bilateral mid SFA high grade disease      S/p PTA of L CFA + SFA with atherectomy + DCB (5/2022)       S/p PTA of R CFA with atherectomy + DCB (8/2022)      Echo 11/2022     Nl EF   Normal LA   Mild MR/TR/SD         Nuc stress test 3/2023      ECG portion of the study is positive for ischemia.   Specificity is reduced secondary to resting ST abnormalities.  Reversible ischemia along the inferior wall, near the base of LV with hypokinesis.  Dilated left ventricle.  Decreased ejection fraction estimated to be 34% during stress and 48% during rest.        LHC and Intervention 4/2023                 iFR guided revascularization             IVUS guided PCI for sizing and evaluation of plaque morphology             Non-dilatable LAD lesion requiring atherectomy              Atherectomy of proxmial/mid LAD with CSI              Vessel preparation with 2.5 and 3.0 mm balloons             PCI with 3.0 x 38 mm Synergy REZA post dilated with 3.5 NC balloon       S/p staged LCX PCI 5/2023                    Right radial access                             LM patent             patent LAD stent              Patent D1,D2, D3, and septals             Patent ostial LCX             Patent LCX/OM stent             Stent jaild LCX 95% stenosis             Patent OMs             RCA-not studied     Intervention                      Crossed LCX/OM stent struts into LCX for modified [place]                 Dilatation with 2.0, 2.5, and 3.0 mm balloons                Stented with 3.0 x 20 mm Synergy REZA post dilated with 3.5 NC                Balloon angioplasty of OM with 3.0 x 12 mm balloon                Proximal LCX post dilatation with 3.5 x 8 mm balloon                 IVUS guided intervention           S/p JOSE + successful DCCV 6/2023        Patient Active Problem List    Diagnosis Date Noted    Cardiac arrest 10/04/2019     Priority: High    Atherosclerosis of native  artery of both lower extremities with intermittent claudication 2018     Priority: High    Paroxysmal atrial fibrillation 2023    Hypothyroidism 2023    Unstable angina 2023     I have messaged his cardiologist and electrophysiologist about this. Currently awaiting response       Persistent atrial fibrillation 2022    Anemia due to stage 4 chronic kidney disease 12/15/2021    CKD (chronic kidney disease) stage 4, GFR 15-29 ml/min 2021    COPD (chronic obstructive pulmonary disease) 2021    Nuclear sclerosis, bilateral 2020    Nephrotic range proteinuria 2020    Hypokalemia 10/04/2019    Bilateral carotid artery stenosis     Coronary artery disease involving native coronary artery of native heart with angina pectoris with documented spasm 2019         3/29/2019    S/p LHC via R radial           LM, LAD, and LCX are patent with luminal irregularities  RCA mid 95% calcified lesion  Normal EF with LVEDP 8 mmHg           PCI of mid LAD with 2.5 x 30 and 2.5 x 15 mm Resolute REZA  PCI of proximal RCA to treat guide dissection with 3.5 x 22 Resolute RZEA        10/11/2019 for cardiac arrest        S/p staged LCX PCI                    L CFA access              IVUS guided PCI              3.0 x 15 mm Resolute REZA post dilated with 3.5 NC balloon           Abnormal cardiovascular stress test 2019         Nuclear stress test 2019     + ECG with 2 mm ST depression   No wall motion abnormalities   Test stopped at 6 minutes, 7 METs, 86% predicted heart rate   Stopped because of R leg claudication + ECG changes            Venous insufficiency of both lower extremities 2018    Hyperlipidemia 2015    DJD (degenerative joint disease), lumbar 2015    DDD (degenerative disc disease) 2015    Claudication in peripheral vascular disease 2014    HTN (hypertension) 2013           Right Arm BP - Sittin/60  Left Arm BP - Sitting:  130/60          LAST HbA1c  Lab Results   Component Value Date    HGBA1C 5.2 02/27/2023       Lipid panel  Lab Results   Component Value Date    CHOL 144 02/27/2023    CHOL 116 (L) 09/02/2022    CHOL 105 (L) 08/20/2021     Lab Results   Component Value Date    HDL 55 02/27/2023    HDL 50 09/02/2022    HDL 44 08/20/2021     Lab Results   Component Value Date    LDLCALC 75.8 02/27/2023    LDLCALC 41.6 (L) 09/02/2022    LDLCALC 51.0 (L) 08/20/2021     Lab Results   Component Value Date    TRIG 66 02/27/2023    TRIG 122 09/02/2022    TRIG 50 08/20/2021     Lab Results   Component Value Date    CHOLHDL 38.2 02/27/2023    CHOLHDL 43.1 09/02/2022    CHOLHDL 41.9 08/20/2021            Review of Systems   Constitutional: Negative for diaphoresis, night sweats, weight gain and weight loss.   HENT:  Negative for congestion.    Eyes:  Negative for blurred vision, discharge and double vision.   Cardiovascular:  Negative for chest pain, claudication, cyanosis, dyspnea on exertion, irregular heartbeat, leg swelling, near-syncope, orthopnea, palpitations, paroxysmal nocturnal dyspnea and syncope.   Respiratory:  Negative for cough, shortness of breath and wheezing.    Endocrine: Negative for cold intolerance, heat intolerance and polyphagia.   Hematologic/Lymphatic: Negative for adenopathy and bleeding problem. Does not bruise/bleed easily.   Skin:  Negative for dry skin and nail changes.   Musculoskeletal:  Negative for arthritis, back pain, falls, joint pain, myalgias and neck pain.   Gastrointestinal:  Negative for bloating, abdominal pain, change in bowel habit and constipation.   Genitourinary:  Negative for bladder incontinence, dysuria, flank pain, genital sores and missed menses.   Neurological:  Negative for aphonia, brief paralysis, difficulty with concentration, dizziness and weakness.   Psychiatric/Behavioral:  Negative for altered mental status and memory loss. The patient does not have insomnia.     Allergic/Immunologic: Negative for environmental allergies.       Objective:   Physical Exam  Constitutional:       Appearance: He is well-developed.      Interventions: He is not intubated.  HENT:      Head: Normocephalic and atraumatic.      Right Ear: External ear normal.      Left Ear: External ear normal.   Eyes:      General: No scleral icterus.        Right eye: No discharge.         Left eye: No discharge.      Conjunctiva/sclera: Conjunctivae normal.      Pupils: Pupils are equal, round, and reactive to light.   Neck:      Thyroid: No thyromegaly.      Vascular: Normal carotid pulses. No carotid bruit, hepatojugular reflux or JVD.      Trachea: No tracheal deviation.   Cardiovascular:      Rate and Rhythm: Normal rate and regular rhythm. No extrasystoles are present.     Chest Wall: PMI is not displaced.      Pulses:           Carotid pulses are 2+ on the right side and 2+ on the left side.       Radial pulses are 2+ on the right side and 2+ on the left side.        Femoral pulses are 2+ on the right side and 2+ on the left side.       Popliteal pulses are 2+ on the right side and 2+ on the left side.        Dorsalis pedis pulses are 1+ on the right side and 2+ on the left side.        Posterior tibial pulses are 1+ on the right side and 2+ on the left side.      Heart sounds: S1 normal and S2 normal. Heart sounds not distant. No midsystolic click. No murmur heard.     No friction rub. No gallop. No S3 sounds.      Comments:       Biphasic R DP and weak but biphasic R PT doppler signals       Biphasic L DP and PT doppler signals          Pulmonary:      Effort: Pulmonary effort is normal. No tachypnea, bradypnea, accessory muscle usage or respiratory distress. He is not intubated.      Breath sounds: Normal breath sounds. No stridor. No decreased breath sounds, wheezing or rales.   Chest:      Chest wall: No tenderness.   Abdominal:      General: There is no distension or abdominal bruit.      Palpations:  There is no mass or pulsatile mass.      Tenderness: There is no abdominal tenderness. There is no guarding or rebound.   Musculoskeletal:         General: No tenderness. Normal range of motion.      Cervical back: Normal range of motion and neck supple.   Lymphadenopathy:      Cervical: No cervical adenopathy.   Skin:     General: Skin is warm.      Coloration: Skin is not pale.      Findings: No erythema or rash.   Neurological:      Mental Status: He is alert and oriented to person, place, and time.      Cranial Nerves: No cranial nerve deficit.      Coordination: Coordination normal.      Deep Tendon Reflexes: Reflexes are normal and symmetric.   Psychiatric:         Behavior: Behavior normal.         Thought Content: Thought content normal.         Judgment: Judgment normal.           Assessment:     1. Coronary artery disease involving native coronary artery of native heart with angina pectoris with documented spasm    2. Paroxysmal atrial fibrillation    3. Mixed hyperlipidemia    4. Primary hypertension    5. Venous insufficiency of both lower extremities    6. Persistent atrial fibrillation    7. CKD (chronic kidney disease) stage 4, GFR 15-29 ml/min    8. Anemia due to stage 4 chronic kidney disease        Plan:             Medical therapy for CAD  DAPT with aspirin + plavix  Intense statin therapy  Arb  Pletal   Eliquis for CVA prevention      Target LDL < 70  Low salt diet  Weight loss  Target BP < 130/80 mmHg          Continue with current medical plan and lifestyle changes.  Return sooner for concerns or questions. If symptoms persist go to the ED  I have reviewed all pertinent data on this patient           Follow up as scheduled. Return sooner for concerns or questions  He expressed verbal understanding and agreed with the plan        Greater than 50% of the visit of 45 minutes was spent counseling, educating, and coordinating the care of the patient.      -In today's visit, at least 4 established  conditions that pose a risk to life or bodily function have been addressed and the conditions are severe.    -In today's visit, monitoring for drug toxicity was accomplished.        Patient's Medications   New Prescriptions    No medications on file   Previous Medications    ALBUTEROL (VENTOLIN HFA) 90 MCG/ACTUATION INHALER    Inhale 2 puffs into the lungs every 6 (six) hours as needed for Wheezing. Rescue    AMIODARONE (PACERONE) 200 MG TAB    Take 1 tablet (200 mg total) by mouth 2 (two) times daily.    APIXABAN (ELIQUIS) 5 MG TAB    Take 5 mg by mouth 2 (two) times daily.    CARVEDILOL (COREG) 25 MG TABLET    Take 1 tablet (25 mg total) by mouth 2 (two) times daily with meals.    CLOPIDOGREL (PLAVIX) 75 MG TABLET    Take 1 tablet (75 mg total) by mouth once daily.    COENZYME Q10 100 MG CAPSULE    Take 100 mg by mouth once daily.    ISOSORBIDE MONONITRATE (IMDUR) 60 MG 24 HR TABLET    Take 1 tablet (60 mg total) by mouth once daily.    LEVOTHYROXINE (SYNTHROID) 50 MCG TABLET    Take one tablet PO every morning on an empty stomach and wait at least 1 hour before eating or taking other medications    RANOLAZINE (RANEXA) 500 MG TB12    Take 1 tablet (500 mg total) by mouth 2 (two) times daily.    ROSUVASTATIN (CRESTOR) 40 MG TAB    TAKE 1 TABLET(40 MG) BY MOUTH EVERY EVENING   Modified Medications    Modified Medication Previous Medication    EPLERENONE (INSPRA) 50 MG TAB eplerenone (INSPRA) 50 MG Tab       Take 1 tablet (50 mg total) by mouth once daily.    Take 1 tablet (50 mg total) by mouth once daily.    NITROGLYCERIN (NITROSTAT) 0.4 MG SL TABLET nitroGLYCERIN (NITROSTAT) 0.4 MG SL tablet       Place 1 tablet (0.4 mg total) under the tongue every 5 (five) minutes as needed for Chest pain.    Place 1 tablet (0.4 mg total) under the tongue every 5 (five) minutes as needed for Chest pain.   Discontinued Medications

## 2023-08-30 ENCOUNTER — PATIENT MESSAGE (OUTPATIENT)
Dept: INTERNAL MEDICINE | Facility: CLINIC | Age: 63
End: 2023-08-30
Payer: COMMERCIAL

## 2023-08-30 ENCOUNTER — LAB VISIT (OUTPATIENT)
Dept: LAB | Facility: HOSPITAL | Age: 63
End: 2023-08-30
Attending: INTERNAL MEDICINE
Payer: COMMERCIAL

## 2023-08-30 DIAGNOSIS — E78.2 MIXED HYPERLIPIDEMIA: Chronic | ICD-10-CM

## 2023-08-30 DIAGNOSIS — D64.9 CHRONIC ANEMIA: ICD-10-CM

## 2023-08-30 DIAGNOSIS — I10 PRIMARY HYPERTENSION: ICD-10-CM

## 2023-08-30 DIAGNOSIS — E03.9 HYPOTHYROIDISM, UNSPECIFIED TYPE: ICD-10-CM

## 2023-08-30 LAB
ALBUMIN SERPL BCP-MCNC: 2.5 G/DL (ref 3.5–5.2)
ALP SERPL-CCNC: 45 U/L (ref 55–135)
ALT SERPL W/O P-5'-P-CCNC: 19 U/L (ref 10–44)
ANION GAP SERPL CALC-SCNC: 14 MMOL/L (ref 8–16)
AST SERPL-CCNC: 24 U/L (ref 10–40)
BASOPHILS # BLD AUTO: 0.04 K/UL (ref 0–0.2)
BASOPHILS NFR BLD: 0.6 % (ref 0–1.9)
BILIRUB SERPL-MCNC: 0.3 MG/DL (ref 0.1–1)
BUN SERPL-MCNC: 27 MG/DL (ref 8–23)
CALCIUM SERPL-MCNC: 8.7 MG/DL (ref 8.7–10.5)
CHLORIDE SERPL-SCNC: 106 MMOL/L (ref 95–110)
CHOLEST SERPL-MCNC: 144 MG/DL (ref 120–199)
CHOLEST/HDLC SERPL: 2.7 {RATIO} (ref 2–5)
CO2 SERPL-SCNC: 24 MMOL/L (ref 23–29)
CREAT SERPL-MCNC: 2.8 MG/DL (ref 0.5–1.4)
DIFFERENTIAL METHOD: ABNORMAL
EOSINOPHIL # BLD AUTO: 0.3 K/UL (ref 0–0.5)
EOSINOPHIL NFR BLD: 5.1 % (ref 0–8)
ERYTHROCYTE [DISTWIDTH] IN BLOOD BY AUTOMATED COUNT: 12.9 % (ref 11.5–14.5)
EST. GFR  (NO RACE VARIABLE): 24.7 ML/MIN/1.73 M^2
GLUCOSE SERPL-MCNC: 93 MG/DL (ref 70–110)
HCT VFR BLD AUTO: 28.8 % (ref 40–54)
HDLC SERPL-MCNC: 53 MG/DL (ref 40–75)
HDLC SERPL: 36.8 % (ref 20–50)
HGB BLD-MCNC: 9.5 G/DL (ref 14–18)
IMM GRANULOCYTES # BLD AUTO: 0.01 K/UL (ref 0–0.04)
IMM GRANULOCYTES NFR BLD AUTO: 0.2 % (ref 0–0.5)
LDLC SERPL CALC-MCNC: 63.8 MG/DL (ref 63–159)
LYMPHOCYTES # BLD AUTO: 1.2 K/UL (ref 1–4.8)
LYMPHOCYTES NFR BLD: 18.4 % (ref 18–48)
MCH RBC QN AUTO: 29.4 PG (ref 27–31)
MCHC RBC AUTO-ENTMCNC: 33 G/DL (ref 32–36)
MCV RBC AUTO: 89 FL (ref 82–98)
MONOCYTES # BLD AUTO: 0.7 K/UL (ref 0.3–1)
MONOCYTES NFR BLD: 10.6 % (ref 4–15)
NEUTROPHILS # BLD AUTO: 4.1 K/UL (ref 1.8–7.7)
NEUTROPHILS NFR BLD: 65.1 % (ref 38–73)
NONHDLC SERPL-MCNC: 91 MG/DL
NRBC BLD-RTO: 0 /100 WBC
PLATELET # BLD AUTO: 172 K/UL (ref 150–450)
PMV BLD AUTO: 11 FL (ref 9.2–12.9)
POTASSIUM SERPL-SCNC: 3.1 MMOL/L (ref 3.5–5.1)
PROT SERPL-MCNC: 6.1 G/DL (ref 6–8.4)
RBC # BLD AUTO: 3.23 M/UL (ref 4.6–6.2)
SODIUM SERPL-SCNC: 144 MMOL/L (ref 136–145)
T4 FREE SERPL-MCNC: 0.91 NG/DL (ref 0.71–1.51)
TRIGL SERPL-MCNC: 136 MG/DL (ref 30–150)
TSH SERPL DL<=0.005 MIU/L-ACNC: 13.67 UIU/ML (ref 0.4–4)
WBC # BLD AUTO: 6.32 K/UL (ref 3.9–12.7)

## 2023-08-30 PROCEDURE — 80053 COMPREHEN METABOLIC PANEL: CPT | Performed by: INTERNAL MEDICINE

## 2023-08-30 PROCEDURE — 85025 COMPLETE CBC W/AUTO DIFF WBC: CPT | Performed by: INTERNAL MEDICINE

## 2023-08-30 PROCEDURE — 84443 ASSAY THYROID STIM HORMONE: CPT | Performed by: INTERNAL MEDICINE

## 2023-08-30 PROCEDURE — 36415 COLL VENOUS BLD VENIPUNCTURE: CPT | Mod: PO | Performed by: INTERNAL MEDICINE

## 2023-08-30 PROCEDURE — 84439 ASSAY OF FREE THYROXINE: CPT | Performed by: INTERNAL MEDICINE

## 2023-08-30 PROCEDURE — 80061 LIPID PANEL: CPT | Performed by: INTERNAL MEDICINE

## 2023-09-05 ENCOUNTER — TELEPHONE (OUTPATIENT)
Dept: INTERNAL MEDICINE | Facility: CLINIC | Age: 63
End: 2023-09-05

## 2023-09-05 ENCOUNTER — OFFICE VISIT (OUTPATIENT)
Dept: INTERNAL MEDICINE | Facility: CLINIC | Age: 63
End: 2023-09-05
Payer: COMMERCIAL

## 2023-09-05 VITALS
HEART RATE: 52 BPM | SYSTOLIC BLOOD PRESSURE: 150 MMHG | HEIGHT: 70 IN | OXYGEN SATURATION: 99 % | WEIGHT: 188.06 LBS | DIASTOLIC BLOOD PRESSURE: 70 MMHG | BODY MASS INDEX: 26.92 KG/M2

## 2023-09-05 DIAGNOSIS — E78.2 MIXED HYPERLIPIDEMIA: Chronic | ICD-10-CM

## 2023-09-05 DIAGNOSIS — E87.6 HYPOKALEMIA: ICD-10-CM

## 2023-09-05 DIAGNOSIS — I25.111 CORONARY ARTERY DISEASE INVOLVING NATIVE CORONARY ARTERY OF NATIVE HEART WITH ANGINA PECTORIS WITH DOCUMENTED SPASM: ICD-10-CM

## 2023-09-05 DIAGNOSIS — Z00.00 ANNUAL PHYSICAL EXAM: ICD-10-CM

## 2023-09-05 DIAGNOSIS — E03.9 HYPOTHYROIDISM, UNSPECIFIED TYPE: ICD-10-CM

## 2023-09-05 DIAGNOSIS — D63.1 ANEMIA DUE TO STAGE 4 CHRONIC KIDNEY DISEASE: ICD-10-CM

## 2023-09-05 DIAGNOSIS — Z23 NEED FOR TETANUS, DIPHTHERIA, AND ACELLULAR PERTUSSIS (TDAP) VACCINE: ICD-10-CM

## 2023-09-05 DIAGNOSIS — N18.4 CKD (CHRONIC KIDNEY DISEASE) STAGE 4, GFR 15-29 ML/MIN: ICD-10-CM

## 2023-09-05 DIAGNOSIS — M75.22 BICEPS TENDINITIS OF LEFT UPPER EXTREMITY: Primary | ICD-10-CM

## 2023-09-05 DIAGNOSIS — I10 PRIMARY HYPERTENSION: ICD-10-CM

## 2023-09-05 DIAGNOSIS — I48.19 PERSISTENT ATRIAL FIBRILLATION: ICD-10-CM

## 2023-09-05 DIAGNOSIS — Z23 NEED FOR VACCINATION AGAINST STREPTOCOCCUS PNEUMONIAE: ICD-10-CM

## 2023-09-05 DIAGNOSIS — N18.4 ANEMIA DUE TO STAGE 4 CHRONIC KIDNEY DISEASE: ICD-10-CM

## 2023-09-05 PROCEDURE — 90715 TDAP VACCINE GREATER THAN OR EQUAL TO 7YO IM: ICD-10-PCS | Mod: S$GLB,,, | Performed by: INTERNAL MEDICINE

## 2023-09-05 PROCEDURE — 90471 IMMUNIZATION ADMIN: CPT | Mod: S$GLB,,, | Performed by: INTERNAL MEDICINE

## 2023-09-05 PROCEDURE — 99999 PR PBB SHADOW E&M-EST. PATIENT-LVL V: CPT | Mod: PBBFAC,,, | Performed by: INTERNAL MEDICINE

## 2023-09-05 PROCEDURE — 3044F PR MOST RECENT HEMOGLOBIN A1C LEVEL <7.0%: ICD-10-PCS | Mod: CPTII,S$GLB,, | Performed by: INTERNAL MEDICINE

## 2023-09-05 PROCEDURE — 3077F PR MOST RECENT SYSTOLIC BLOOD PRESSURE >= 140 MM HG: ICD-10-PCS | Mod: CPTII,S$GLB,, | Performed by: INTERNAL MEDICINE

## 2023-09-05 PROCEDURE — 90715 TDAP VACCINE 7 YRS/> IM: CPT | Mod: S$GLB,,, | Performed by: INTERNAL MEDICINE

## 2023-09-05 PROCEDURE — 90677 PCV20 VACCINE IM: CPT | Mod: S$GLB,,, | Performed by: INTERNAL MEDICINE

## 2023-09-05 PROCEDURE — 3008F BODY MASS INDEX DOCD: CPT | Mod: CPTII,S$GLB,, | Performed by: INTERNAL MEDICINE

## 2023-09-05 PROCEDURE — 1159F PR MEDICATION LIST DOCUMENTED IN MEDICAL RECORD: ICD-10-PCS | Mod: CPTII,S$GLB,, | Performed by: INTERNAL MEDICINE

## 2023-09-05 PROCEDURE — 3044F HG A1C LEVEL LT 7.0%: CPT | Mod: CPTII,S$GLB,, | Performed by: INTERNAL MEDICINE

## 2023-09-05 PROCEDURE — 3078F PR MOST RECENT DIASTOLIC BLOOD PRESSURE < 80 MM HG: ICD-10-PCS | Mod: CPTII,S$GLB,, | Performed by: INTERNAL MEDICINE

## 2023-09-05 PROCEDURE — 3008F PR BODY MASS INDEX (BMI) DOCUMENTED: ICD-10-PCS | Mod: CPTII,S$GLB,, | Performed by: INTERNAL MEDICINE

## 2023-09-05 PROCEDURE — 99999 PR PBB SHADOW E&M-EST. PATIENT-LVL V: ICD-10-PCS | Mod: PBBFAC,,, | Performed by: INTERNAL MEDICINE

## 2023-09-05 PROCEDURE — 1159F MED LIST DOCD IN RCRD: CPT | Mod: CPTII,S$GLB,, | Performed by: INTERNAL MEDICINE

## 2023-09-05 PROCEDURE — 90472 PNEUMOCOCCAL CONJUGATE VACCINE 20-VALENT: ICD-10-PCS | Mod: S$GLB,,, | Performed by: INTERNAL MEDICINE

## 2023-09-05 PROCEDURE — 3078F DIAST BP <80 MM HG: CPT | Mod: CPTII,S$GLB,, | Performed by: INTERNAL MEDICINE

## 2023-09-05 PROCEDURE — 90472 IMMUNIZATION ADMIN EACH ADD: CPT | Mod: S$GLB,,, | Performed by: INTERNAL MEDICINE

## 2023-09-05 PROCEDURE — 3077F SYST BP >= 140 MM HG: CPT | Mod: CPTII,S$GLB,, | Performed by: INTERNAL MEDICINE

## 2023-09-05 PROCEDURE — 99214 OFFICE O/P EST MOD 30 MIN: CPT | Mod: 25,S$GLB,, | Performed by: INTERNAL MEDICINE

## 2023-09-05 PROCEDURE — 1160F RVW MEDS BY RX/DR IN RCRD: CPT | Mod: CPTII,S$GLB,, | Performed by: INTERNAL MEDICINE

## 2023-09-05 PROCEDURE — 99214 PR OFFICE/OUTPT VISIT, EST, LEVL IV, 30-39 MIN: ICD-10-PCS | Mod: 25,S$GLB,, | Performed by: INTERNAL MEDICINE

## 2023-09-05 PROCEDURE — 90677 PNEUMOCOCCAL CONJUGATE VACCINE 20-VALENT: ICD-10-PCS | Mod: S$GLB,,, | Performed by: INTERNAL MEDICINE

## 2023-09-05 PROCEDURE — 90471 TDAP VACCINE GREATER THAN OR EQUAL TO 7YO IM: ICD-10-PCS | Mod: S$GLB,,, | Performed by: INTERNAL MEDICINE

## 2023-09-05 PROCEDURE — 1160F PR REVIEW ALL MEDS BY PRESCRIBER/CLIN PHARMACIST DOCUMENTED: ICD-10-PCS | Mod: CPTII,S$GLB,, | Performed by: INTERNAL MEDICINE

## 2023-09-05 NOTE — PROGRESS NOTES
Patient ID: Domingo Gross is a 63 y.o. male.    Chief Complaint: Hypertension    HPI: Taran is a 63 y.o. male with  chronic anemia, CKD stage 3/4, CAD, PAD, HTN, HLD, COPD, suspected skin cancer and history of cardiac arrest who presents for routine follow-up of medical conditions.   Since last visit with me, he has seen cardiology and had procedures for his heart.  Patient recently took blood pressure meds this morning, shortly before our visit.  He does not check blood pressure at home.    Reviewed lab results from 08/30/2023.  He has since restarted his potassium supplement.  He reports recently missing 2 doses of thyroid medication.  He does not take any over-the-counter supplements, specifically biotin.  He complains of pain located in upper area of left arm (just below the shoulder). Shooting pain in character. Does not go lower than the elbow. No associated symptoms.     Health Maintenance Topics with due status: Not Due       Topic Last Completion Date    Colorectal Cancer Screening 01/06/2022    PROSTATE-SPECIFIC ANTIGEN 02/27/2023    Hemoglobin A1c (Diabetic Prevention Screening) 02/27/2023    Lipid Panel 08/30/2023    TETANUS VACCINE 09/05/2023    High Dose Statin 09/05/2023        Review of Systems   Musculoskeletal:         See HPI   All other systems reviewed and are negative.      Objective:     Vitals:    09/05/23 1025   BP: (!) 150/70   Pulse:         Physical Exam  Vitals reviewed.   Constitutional:       General: He is not in acute distress.     Appearance: Normal appearance. He is well-developed. He is not ill-appearing, toxic-appearing or diaphoretic.   HENT:      Head: Normocephalic and atraumatic.      Right Ear: External ear normal.      Left Ear: External ear normal.      Nose: Nose normal.   Eyes:      General:         Right eye: No discharge.         Left eye: No discharge.      Extraocular Movements: Extraocular movements intact.      Conjunctiva/sclera: Conjunctivae normal.   Neck:       Thyroid: No thyromegaly.      Trachea: No tracheal deviation.   Cardiovascular:      Rate and Rhythm: Normal rate and regular rhythm.      Pulses: Normal pulses.      Heart sounds: Normal heart sounds. No murmur heard.     No friction rub. No gallop.   Pulmonary:      Effort: Pulmonary effort is normal. No respiratory distress.      Breath sounds: Normal breath sounds. No stridor. No wheezing, rhonchi or rales.   Chest:      Chest wall: No tenderness.   Abdominal:      General: Bowel sounds are normal. There is no distension.      Palpations: Abdomen is soft. There is no mass.      Tenderness: There is no abdominal tenderness. There is no guarding or rebound.      Hernia: No hernia is present.   Musculoskeletal:         General: No tenderness or deformity.      Cervical back: Neck supple.      Right lower leg: No edema.      Left lower leg: No edema.   Lymphadenopathy:      Cervical: No cervical adenopathy.   Skin:     General: Skin is warm and dry.      Findings: No erythema or rash.   Neurological:      General: No focal deficit present.      Mental Status: He is alert and oriented to person, place, and time. Mental status is at baseline.      Cranial Nerves: No cranial nerve deficit.   Psychiatric:         Mood and Affect: Mood normal.         Behavior: Behavior normal.         Thought Content: Thought content normal.         Judgment: Judgment normal.         Assessment:       1. Biceps tendinitis of left upper extremity Active   2. Hypokalemia Active   3. Hypothyroidism, unspecified type Sub-optimally controlled   4. Primary hypertension Sub-optimally controlled   5. Mixed hyperlipidemia Well controlled   6. Coronary artery disease involving native coronary artery of native heart with angina pectoris with documented spasm Chronic   7. Persistent atrial fibrillation Well controlled   8. CKD (chronic kidney disease) stage 4, GFR 15-29 ml/min Chronic   9. Anemia due to stage 4 chronic kidney disease Chronic    10. Need for tetanus, diphtheria, and acellular pertussis (Tdap) vaccine    11. Need for vaccination against Streptococcus pneumoniae    12. Annual physical exam        Plan:         Biceps tendinitis of left upper extremity  Comments:  See AVS. CAn use topical products, tylenol and stretching. If no relief, alert me for Ortho referral    Hypokalemia  Comments:  He is restarting his K supplement. Recheck level in about 1 week  Orders:  -     BASIC METABOLIC PANEL; Future; Expected date: 09/05/2023  -     BASIC METABOLIC PANEL; Future; Expected date: 09/05/2023    Hypothyroidism, unspecified type  Comments:  Taking med as prescribed. Recheck TSH in 6 weeks  Orders:  -     TSH; Future; Expected date: 09/05/2023    Primary hypertension  Comments:  Continue current medications.  see AVS    Mixed hyperlipidemia  Comments:  Continue current medication    Coronary artery disease involving native coronary artery of native heart with angina pectoris with documented spasm  Comments:  Continue current medication.  Continue to follow with Cardiology    Persistent atrial fibrillation  Comments:  Continue current medication.  Continue to follow-up cardiology.    CKD (chronic kidney disease) stage 4, GFR 15-29 ml/min  Comments:  Avoid NSAIDs.  Continue to monitor.  Scheduling back to Nephrology.    Anemia due to stage 4 chronic kidney disease  Comments:  Continue to monitor    Need for tetanus, diphtheria, and acellular pertussis (Tdap) vaccine  -     (In Office Administered) Tdap Vaccine    Need for vaccination against Streptococcus pneumoniae  -     (In Office Administered) Pneumococcal Conjugate Vaccine (20 Valent) (IM)    Annual physical exam  -     CBC Auto Differential; Future; Expected date: 01/01/2024  -     Comprehensive Metabolic Panel; Future; Expected date: 01/01/2024  -     Hemoglobin A1C; Future; Expected date: 01/01/2024  -     Lipid Panel; Future; Expected date: 01/01/2024  -     TSH; Future; Expected date:  01/01/2024  -     PSA, Screening; Future; Expected date: 01/01/2024        RTC 6 months for annual exam    Warning signs discussed, patient to call with any further issues or worsening of symptoms.       Parts of the above note were dictated using a voice dictation software. Please excuse any grammatical or typographical errors.

## 2023-09-05 NOTE — TELEPHONE ENCOUNTER
June 14, 2023       Kina Park MD  [address]  [address]  [facility] [address]  Via In Basket      Patient: Tracy Loredo   YOB: 1956   Date of Visit: 6/14/2023       Dear Dr. Park:    Thank you for referring Tracy Loredo to me for evaluation. Below are my notes for this visit with her.    If you have questions, please do not hesitate to call me. I look forward to following your patient along with you.      Sincerely,        Robert Silvestre MD        CC: No Recipients    Robert Silvestre MD  6/14/2023  5:26 PM  Signed  OUTPATIENT PROGRESS NOTE - ORTHOPEDICS    CHIEF COMPLAINT:  Follow-up of the Left Shoulder and Office Visit      SUBJECTIVE:  Tracy is a 66 year old female who returns for reevaluation of her left shoulder impingement syndrome and left carpal tunnel syndrome.  She notes she has failed to progress with physical therapy.  She notes the cortisone injection given about a month ago has started to wear off.  She also reports her carpal tunnel symptoms have become more severe having pain almost every night.  She has he is having a great deal of difficulty sleeping at night.  She would like to schedule shoulder arthroscopy, but has a wedding coming up in June and would like to hold off until then.  She would like a refill of her lidocaine patches, but her insurance will not allow her to have anymore without some sort of exception statement.    Interim history: Patient returns 8 months after the last evaluation.  At that time I gave her a subacromial corticosteroid injection in the left shoulder area and she reports dramatic complete resolution of symptoms until most recently.  She has pain at night.  She has left shoulder pain.  She comes in seeking additional treatment.    Interim history: Patient returns 9 months after the last evaluation.  At the last visit she received a subacromial corticosteroid injection into the left shoulder.  She reports dramatic  Left voicemail with new appt time and date and informed patient that they could call us to reschedule or that they can reschedule through the [facility] portal if the date or time does not work with their schedule.    improvement in symptoms.  Recently pain has returned to the left shoulder.  She has pain at night.  She has pain with activity.  It radiates towards her neck as well.  The numbness and tingling in the hand has improved.    Interim history: Patient returns about 6 months after the last evaluation.  She comes in with continued left shoulder pain.  Her previous injection provided excellent relief.  Symptoms have returned.  She has night pain.  She has activity related pain.  It radiates up and down her arm and towards her neck.    Interim history 9/22/2022: Patient returns about 9 months after the above evaluation.  At the last visit she was given a left shoulder subacromial corticosteroid injection for impingement.  She reported this provided relief of symptoms.  She comes in today with continued pain.  She has night pain.  She is using a heating pad.  She denies numbness or tingling in the hand.    Interim History (6/14/2023): Patient returns to our office approximately 9 months following the above evaluation.  She states her left shoulder has become increasingly painful in the last few months.  No trauma or injury.  She has not been diligent with her home exercises.  She has significant night pain.  No associated numbness or tingling.  She is hoping for repeat cortisone injection in her left shoulder today for pain relief.    MEDICATIONS:  Medications were reviewed and updated today.    HISTORIES:    ALLERGIES:  Ambien, Benadryl allergy, Cariprazine, Cefprozil, Cefzil, Cephalosporins, Ciprofloxacin, Latex, Levaquin, Levofloxacin, Nickel, Phenobarbital, Sulfa antibiotics, and Sumatriptan    Past Medical History:   Diagnosis Date   • Arthritis    • Bipolar 1 disorder (CMD)    • Depression    • GERD (gastroesophageal reflux disease)    • Hyperlipemia    • Hypertension    • IBS (irritable bowel syndrome)    • Melanoma (CMD)    • Migraine    • JOESPH (obstructive sleep apnea)        Past Surgical History:   Procedure  Laterality Date   • Colonoscopy  04/30/2015   • Hemorrhoidectomy     • Plantar fascia surgery Bilateral         Family History   Problem Relation Age of Onset   • Cancer Mother    • Dementia/Alzheimers Mother    • Hypertension Mother    • Stroke Mother    • Stroke Father    • Diabetes Father        Social History     Tobacco Use   • Smoking status: Never   • Smokeless tobacco: Never   Substance Use Topics   • Alcohol use: Never       REVIEW OF SYSTEMS:    All other systems are reviewed and are negative except as documented in the HPI (History of Present Illness)    PHYSICAL EXAM    There were no vitals taken for this visit.    Constitutional: Well developed, well nourished individual in no acute distress.    Skin: Warm, dry, intact without rash or lesion.    COR:  RRR    Lungs:  No labored respirations    Neurologic: Alert & oriented x 3, Normal gait.    Psychiatric: Speech and behavior appropriate.    Musculoskeletal:  Left Shoulder: Mild  TTP Anterior. No Atrophy. AROM: FF = 150 / ABD = 140 / ER1 - 45  / IR - low back. No significant glenohumeral crepitus. Strength: Supraspinatus 5-/5 with pain, Infraspinatus 5/5 without pain, Subscapularis 5-/5 with pain. Negative ER Lag. Negative Lift-Off. Positive Nair. No anterior or posterior instability noted. NVI distally.;      IMAGING  MRI L shoulder:  Impression: 1.  Superior labral tear. 2.  Distal supraspinatus and infraspinatus tendinopathy and tearing, but without complete rupture or retraction noted. 3.  Minimal caudal spurring at acromioclavicular joint. 4.  Focal patchy area of STIR signal hyperintensity in the posterolateral proximal arm subcutaneous fat, possibly small focal area of edema or inflammatory change, but without discrete abscess noted. Electronically Signed by: DARSHANA ADORNO M.D. Signed on: 12/6/2019 4:22 PM    Xr Shoulder 3 Views Left    Result Date: 11/23/2020  3 views of the left shoulder to include an AP, scapular Y and axillary view  demonstrate a reduced glenohumeral joint.  Normal study.    EMG IMPRESSION  1.Electrodiagnostic study remarkable for mild left median nerve   compression neuropathy at the wrist(Carpal tunnel syndrome)   without evidence of axonal loss  2.No evidence of cervical radiculopathy  3.No evidence of peripheral neuropathy  4.Clinical correlation is recommended    L Inj/Asp: L subacromial bursa on 6/14/2023 3:11 PM  Indications: pain  Details: 22 G needle, posterior approach  Medications: 40 mg methylPREDNISolone ACETATE long-acting DEPOT 40 MG/ML; 5 mL lidocaine 1 %  Outcome: tolerated well, no immediate complications  Procedure, treatment alternatives, risks and benefits explained, specific risks discussed. Consent was given by the patient. Immediately prior to procedure a time out was called to verify the correct patient, procedure, equipment, support staff and site/side marked as required. Patient was prepped and draped in the usual sterile fashion.         ASSESSMENT/PLAN:   Impingement syndrome of left shoulder  (primary encounter diagnosis)    Radiographs reviewed.  Diagnosis reviewed with patient and all questions were answered.  She is interested in a repeat cortisone injection today for pain relief.  Risks and benefits of injection were reviewed with patient and she wishes to proceed.  She will ice following injection.  Patient was strongly encouraged to resume her physical therapy home exercise program.  She will follow-up in our office as needed.  Patient verbalized understanding and is in agreement this plan.    Pt was seen and treated today in collaboration with Dr. Robert Silvestre MD.    Patient has recurrence of her left shoulder pain consistent with impingement syndrome.  She had a nice response from the previous corticosteroid injection.  We discussed another injection into the shoulder subacromial space.  Please see above for details regarding the injection provided today.  We will see her back as  needed.  She will work on a home exercise program for her shoulder.  She was in agreement with this plan.

## 2023-09-05 NOTE — TELEPHONE ENCOUNTER
----- Message from Analy Encarnacion MD sent at 9/5/2023  3:01 PM CDT -----    ----- Message -----  From: Carla Galan  Sent: 9/5/2023   2:12 PM CDT  To: Shashank Beckford Staff    Type:  Patient Returning Call    Who Called:pt  Who Left Message for Patient:casper  Does the patient know what this is regarding?:returning a call  Would the patient rather a call back or a response via [facility]? call  Best Call Back Number:[phone]  Additional Information:

## 2023-09-05 NOTE — PATIENT INSTRUCTIONS
We are giving you tetanus and pneumonia vaccines.   You should get the following at the pharmacy: covid booster, shingles vaccine, influenza vaccine and RSV vaccine.     Please check blood pressure around noon every day. Write the numbers down. After collecting about one week of data, please message me the numbers on the portal.

## 2023-09-05 NOTE — TELEPHONE ENCOUNTER
----- Message from Analy Encarnacion MD sent at 9/5/2023 12:21 PM CDT -----  Please make sure Mr. Gross is scheduled for a BMP in one week and again in 6 weeks. (I only see the 6 weeks scheduled right now)/ Thanks!

## 2023-09-12 ENCOUNTER — LAB VISIT (OUTPATIENT)
Dept: LAB | Facility: HOSPITAL | Age: 63
End: 2023-09-12
Attending: INTERNAL MEDICINE
Payer: COMMERCIAL

## 2023-09-12 DIAGNOSIS — E87.6 HYPOKALEMIA: ICD-10-CM

## 2023-09-12 LAB
ANION GAP SERPL CALC-SCNC: 11 MMOL/L (ref 8–16)
BUN SERPL-MCNC: 19 MG/DL (ref 8–23)
CALCIUM SERPL-MCNC: 8 MG/DL (ref 8.7–10.5)
CHLORIDE SERPL-SCNC: 107 MMOL/L (ref 95–110)
CO2 SERPL-SCNC: 22 MMOL/L (ref 23–29)
CREAT SERPL-MCNC: 2.7 MG/DL (ref 0.5–1.4)
EST. GFR  (NO RACE VARIABLE): 25.8 ML/MIN/1.73 M^2
GLUCOSE SERPL-MCNC: 84 MG/DL (ref 70–110)
POTASSIUM SERPL-SCNC: 3.6 MMOL/L (ref 3.5–5.1)
SODIUM SERPL-SCNC: 140 MMOL/L (ref 136–145)

## 2023-09-12 PROCEDURE — 36415 COLL VENOUS BLD VENIPUNCTURE: CPT | Mod: PO | Performed by: INTERNAL MEDICINE

## 2023-09-12 PROCEDURE — 80048 BASIC METABOLIC PNL TOTAL CA: CPT | Performed by: INTERNAL MEDICINE

## 2023-09-13 ENCOUNTER — OFFICE VISIT (OUTPATIENT)
Dept: HEMATOLOGY/ONCOLOGY | Facility: CLINIC | Age: 63
End: 2023-09-13
Payer: COMMERCIAL

## 2023-09-13 VITALS
DIASTOLIC BLOOD PRESSURE: 72 MMHG | WEIGHT: 188.25 LBS | HEART RATE: 54 BPM | RESPIRATION RATE: 15 BRPM | OXYGEN SATURATION: 99 % | BODY MASS INDEX: 26.95 KG/M2 | HEIGHT: 70 IN | SYSTOLIC BLOOD PRESSURE: 152 MMHG

## 2023-09-13 DIAGNOSIS — N18.4 CKD (CHRONIC KIDNEY DISEASE) STAGE 4, GFR 15-29 ML/MIN: ICD-10-CM

## 2023-09-13 DIAGNOSIS — I73.9 PAD (PERIPHERAL ARTERY DISEASE): ICD-10-CM

## 2023-09-13 DIAGNOSIS — N18.4 ANEMIA DUE TO STAGE 4 CHRONIC KIDNEY DISEASE: Primary | ICD-10-CM

## 2023-09-13 DIAGNOSIS — D63.1 ANEMIA DUE TO STAGE 4 CHRONIC KIDNEY DISEASE: Primary | ICD-10-CM

## 2023-09-13 DIAGNOSIS — D50.9 IRON DEFICIENCY ANEMIA, UNSPECIFIED IRON DEFICIENCY ANEMIA TYPE: ICD-10-CM

## 2023-09-13 PROCEDURE — 99999 PR PBB SHADOW E&M-EST. PATIENT-LVL IV: CPT | Mod: PBBFAC,,, | Performed by: INTERNAL MEDICINE

## 2023-09-13 PROCEDURE — 3044F PR MOST RECENT HEMOGLOBIN A1C LEVEL <7.0%: ICD-10-PCS | Mod: CPTII,S$GLB,, | Performed by: INTERNAL MEDICINE

## 2023-09-13 PROCEDURE — 3008F PR BODY MASS INDEX (BMI) DOCUMENTED: ICD-10-PCS | Mod: CPTII,S$GLB,, | Performed by: INTERNAL MEDICINE

## 2023-09-13 PROCEDURE — 3008F BODY MASS INDEX DOCD: CPT | Mod: CPTII,S$GLB,, | Performed by: INTERNAL MEDICINE

## 2023-09-13 PROCEDURE — 3078F PR MOST RECENT DIASTOLIC BLOOD PRESSURE < 80 MM HG: ICD-10-PCS | Mod: CPTII,S$GLB,, | Performed by: INTERNAL MEDICINE

## 2023-09-13 PROCEDURE — 1159F PR MEDICATION LIST DOCUMENTED IN MEDICAL RECORD: ICD-10-PCS | Mod: CPTII,S$GLB,, | Performed by: INTERNAL MEDICINE

## 2023-09-13 PROCEDURE — 3077F PR MOST RECENT SYSTOLIC BLOOD PRESSURE >= 140 MM HG: ICD-10-PCS | Mod: CPTII,S$GLB,, | Performed by: INTERNAL MEDICINE

## 2023-09-13 PROCEDURE — 99999 PR PBB SHADOW E&M-EST. PATIENT-LVL IV: ICD-10-PCS | Mod: PBBFAC,,, | Performed by: INTERNAL MEDICINE

## 2023-09-13 PROCEDURE — 3077F SYST BP >= 140 MM HG: CPT | Mod: CPTII,S$GLB,, | Performed by: INTERNAL MEDICINE

## 2023-09-13 PROCEDURE — 3078F DIAST BP <80 MM HG: CPT | Mod: CPTII,S$GLB,, | Performed by: INTERNAL MEDICINE

## 2023-09-13 PROCEDURE — 1159F MED LIST DOCD IN RCRD: CPT | Mod: CPTII,S$GLB,, | Performed by: INTERNAL MEDICINE

## 2023-09-13 PROCEDURE — 99214 OFFICE O/P EST MOD 30 MIN: CPT | Mod: S$GLB,,, | Performed by: INTERNAL MEDICINE

## 2023-09-13 PROCEDURE — 1160F RVW MEDS BY RX/DR IN RCRD: CPT | Mod: CPTII,S$GLB,, | Performed by: INTERNAL MEDICINE

## 2023-09-13 PROCEDURE — 3044F HG A1C LEVEL LT 7.0%: CPT | Mod: CPTII,S$GLB,, | Performed by: INTERNAL MEDICINE

## 2023-09-13 PROCEDURE — 99214 PR OFFICE/OUTPT VISIT, EST, LEVL IV, 30-39 MIN: ICD-10-PCS | Mod: S$GLB,,, | Performed by: INTERNAL MEDICINE

## 2023-09-13 PROCEDURE — 1160F PR REVIEW ALL MEDS BY PRESCRIBER/CLIN PHARMACIST DOCUMENTED: ICD-10-PCS | Mod: CPTII,S$GLB,, | Performed by: INTERNAL MEDICINE

## 2023-09-13 NOTE — PROGRESS NOTES
"PATIENT: Domingo Gross  MRN: [id number]  DATE: 9/13/2023    Subjective:     Chief complaint:  Chief Complaint   Patient presents with    Anemia       Interval History: Mr. Gross returns for follow up. He had previously been on monthly retacrit shots under the care of Dr. Woods but had not required them in quite a while. He had blood work recently showing decline in hemoglobin to 9.5. He denies dyspnea/chest pain. Denies overt bleeding. Energy at baseline. No complaints really. Discussed recommendation to resume Epo--he would like to do that.       Review of Systems   A comprehensive review of systems was performed; pertinent positives and negatives are noted in the HPI.        Objective:      Vitals:   Vitals:    09/13/23 0931   BP: (!) 152/72   BP Location: Right arm   Patient Position: Sitting   BP Method: Medium (Automatic)   Pulse: (!) 54   Resp: 15   SpO2: 99%   Weight: 85.4 kg (188 lb 4.4 oz)   Height: 5' 10" (1.778 m)     BMI: Body mass index is 27.01 kg/m².      Physical Exam:   Physical Exam  Vitals and nursing note reviewed.   Constitutional:       General: He is not in acute distress.     Appearance: Normal appearance. He is normal weight. He is not toxic-appearing.   HENT:      Head: Normocephalic and atraumatic.   Eyes:      General: No scleral icterus.     Conjunctiva/sclera: Conjunctivae normal.   Cardiovascular:      Rate and Rhythm: Normal rate.   Pulmonary:      Effort: Pulmonary effort is normal. No respiratory distress.   Musculoskeletal:         General: No deformity.      Cervical back: Neck supple.   Lymphadenopathy:      Cervical: No cervical adenopathy.   Skin:     Coloration: Skin is not jaundiced.      Findings: No erythema.   Neurological:      General: No focal deficit present.      Mental Status: He is alert and oriented to person, place, and time. Mental status is at baseline.   Psychiatric:         Mood and Affect: Mood normal.         Behavior: Behavior normal.         Thought " Content: Thought content normal.           Laboratory Data:  WBC   Date Value Ref Range Status   08/30/2023 6.32 3.90 - 12.70 K/uL Final     Hemoglobin   Date Value Ref Range Status   08/30/2023 9.5 (L) 14.0 - 18.0 g/dL Final     Hematocrit   Date Value Ref Range Status   08/30/2023 28.8 (L) 40.0 - 54.0 % Final     Platelets   Date Value Ref Range Status   08/30/2023 172 150 - 450 K/uL Final     Gran # (ANC)   Date Value Ref Range Status   08/30/2023 4.1 1.8 - 7.7 K/uL Final     Gran %   Date Value Ref Range Status   08/30/2023 65.1 38.0 - 73.0 % Final       Chemistry        Component Value Date/Time     09/12/2023 0913    K 3.6 09/12/2023 0913     09/12/2023 0913    CO2 22 (L) 09/12/2023 0913    BUN 19 09/12/2023 0913    CREATININE 2.7 (H) 09/12/2023 0913    GLU 84 09/12/2023 0913        Component Value Date/Time    CALCIUM 8.0 (L) 09/12/2023 0913    ALKPHOS 45 (L) 08/30/2023 0742    AST 24 08/30/2023 0742    ALT 19 08/30/2023 0742    BILITOT 0.3 08/30/2023 0742    ESTGFRAFRICA 28 (A) 07/25/2022 0842    EGFRNONAA 24 (A) 07/25/2022 0842              Assessment/Plan:     1. Anemia due to stage 4 chronic kidney disease    2. CKD (chronic kidney disease) stage 4, GFR 15-29 ml/min    3. Iron deficiency anemia, unspecified iron deficiency anemia type    4. PAD (peripheral artery disease)      Overall doing pretty well however hemoglobin has gradually declined--will resume Epo shots. Retacrit treatment plan updated.      Med and Orders:  Orders Placed This Encounter    CBC auto differential       Follow Up:  Follow up in about 6 months (around 3/13/2024).    Patient instructions:   There are no Patient Instructions on file for this visit.    Above care plan was discussed with patient and all questions were addressed to their expressed satisfaction.       Robby Reddy MD, FACP  Hematology & Medical Oncology  [facility]

## 2023-09-26 ENCOUNTER — OFFICE VISIT (OUTPATIENT)
Dept: INTERNAL MEDICINE | Facility: CLINIC | Age: 63
End: 2023-09-26
Payer: COMMERCIAL

## 2023-09-26 ENCOUNTER — PATIENT MESSAGE (OUTPATIENT)
Dept: INTERNAL MEDICINE | Facility: CLINIC | Age: 63
End: 2023-09-26
Payer: COMMERCIAL

## 2023-09-26 VITALS
WEIGHT: 182.75 LBS | HEIGHT: 70 IN | SYSTOLIC BLOOD PRESSURE: 106 MMHG | BODY MASS INDEX: 26.16 KG/M2 | HEART RATE: 64 BPM | DIASTOLIC BLOOD PRESSURE: 64 MMHG | OXYGEN SATURATION: 98 % | TEMPERATURE: 99 F

## 2023-09-26 DIAGNOSIS — N18.4 CHRONIC KIDNEY DISEASE, STAGE 4 (SEVERE): ICD-10-CM

## 2023-09-26 DIAGNOSIS — B34.9 VIRAL ILLNESS: ICD-10-CM

## 2023-09-26 DIAGNOSIS — U07.1 COVID-19: ICD-10-CM

## 2023-09-26 LAB
CTP QC/QA: YES
CTP QC/QA: YES
POC MOLECULAR INFLUENZA A AGN: NEGATIVE
POC MOLECULAR INFLUENZA B AGN: NEGATIVE
SARS-COV-2 RDRP RESP QL NAA+PROBE: POSITIVE

## 2023-09-26 PROCEDURE — 1159F PR MEDICATION LIST DOCUMENTED IN MEDICAL RECORD: ICD-10-PCS | Mod: CPTII,S$GLB,, | Performed by: INTERNAL MEDICINE

## 2023-09-26 PROCEDURE — 1160F PR REVIEW ALL MEDS BY PRESCRIBER/CLIN PHARMACIST DOCUMENTED: ICD-10-PCS | Mod: CPTII,S$GLB,, | Performed by: INTERNAL MEDICINE

## 2023-09-26 PROCEDURE — 3008F BODY MASS INDEX DOCD: CPT | Mod: CPTII,S$GLB,, | Performed by: INTERNAL MEDICINE

## 2023-09-26 PROCEDURE — 3044F PR MOST RECENT HEMOGLOBIN A1C LEVEL <7.0%: ICD-10-PCS | Mod: CPTII,S$GLB,, | Performed by: INTERNAL MEDICINE

## 2023-09-26 PROCEDURE — 99999 PR PBB SHADOW E&M-EST. PATIENT-LVL V: CPT | Mod: PBBFAC,,, | Performed by: INTERNAL MEDICINE

## 2023-09-26 PROCEDURE — 87502 POCT INFLUENZA A/B MOLECULAR: ICD-10-PCS | Mod: QW,S$GLB,, | Performed by: INTERNAL MEDICINE

## 2023-09-26 PROCEDURE — 3074F PR MOST RECENT SYSTOLIC BLOOD PRESSURE < 130 MM HG: ICD-10-PCS | Mod: CPTII,S$GLB,, | Performed by: INTERNAL MEDICINE

## 2023-09-26 PROCEDURE — 99999 PR PBB SHADOW E&M-EST. PATIENT-LVL V: ICD-10-PCS | Mod: PBBFAC,,, | Performed by: INTERNAL MEDICINE

## 2023-09-26 PROCEDURE — 3008F PR BODY MASS INDEX (BMI) DOCUMENTED: ICD-10-PCS | Mod: CPTII,S$GLB,, | Performed by: INTERNAL MEDICINE

## 2023-09-26 PROCEDURE — 3078F DIAST BP <80 MM HG: CPT | Mod: CPTII,S$GLB,, | Performed by: INTERNAL MEDICINE

## 2023-09-26 PROCEDURE — 3074F SYST BP LT 130 MM HG: CPT | Mod: CPTII,S$GLB,, | Performed by: INTERNAL MEDICINE

## 2023-09-26 PROCEDURE — 87635: ICD-10-PCS | Mod: QW,S$GLB,, | Performed by: INTERNAL MEDICINE

## 2023-09-26 PROCEDURE — 87502 INFLUENZA DNA AMP PROBE: CPT | Mod: QW,S$GLB,, | Performed by: INTERNAL MEDICINE

## 2023-09-26 PROCEDURE — 3078F PR MOST RECENT DIASTOLIC BLOOD PRESSURE < 80 MM HG: ICD-10-PCS | Mod: CPTII,S$GLB,, | Performed by: INTERNAL MEDICINE

## 2023-09-26 PROCEDURE — 87635 SARS-COV-2 COVID-19 AMP PRB: CPT | Mod: QW,S$GLB,, | Performed by: INTERNAL MEDICINE

## 2023-09-26 PROCEDURE — 3044F HG A1C LEVEL LT 7.0%: CPT | Mod: CPTII,S$GLB,, | Performed by: INTERNAL MEDICINE

## 2023-09-26 PROCEDURE — 99214 OFFICE O/P EST MOD 30 MIN: CPT | Mod: S$GLB,,, | Performed by: INTERNAL MEDICINE

## 2023-09-26 PROCEDURE — 1159F MED LIST DOCD IN RCRD: CPT | Mod: CPTII,S$GLB,, | Performed by: INTERNAL MEDICINE

## 2023-09-26 PROCEDURE — 1160F RVW MEDS BY RX/DR IN RCRD: CPT | Mod: CPTII,S$GLB,, | Performed by: INTERNAL MEDICINE

## 2023-09-26 PROCEDURE — 99214 PR OFFICE/OUTPT VISIT, EST, LEVL IV, 30-39 MIN: ICD-10-PCS | Mod: S$GLB,,, | Performed by: INTERNAL MEDICINE

## 2023-09-26 NOTE — PROGRESS NOTES
Patient ID: Domingo Gross is a 63 y.o. male.    Chief Complaint: Cough    HPI: Taran is a 63 y.o. male with COPD, CAD and CKD 4 who presents with chief complaint of cough. Onset was 2 days ago.  Associated with symptoms of weakness, lack of appetite, muscle aches, eyes burning, fever (99.7°).  No associated symptoms of wheezing, sinus pressure/pain, ear pain, or sore throat.  He has not taken any medications for relief.  Symptoms are constant in duration.  Nothing is relieving symptoms at this time.  No sick contacts that he is aware of.    Review of Systems   Constitutional:  Positive for appetite change and fever.   Respiratory:  Positive for cough.    Musculoskeletal:  Positive for myalgias.   Neurological:  Positive for weakness.   All other systems reviewed and are negative.     Objective:     Vitals:    09/26/23 0859   BP: 106/64   Pulse: 64   Temp: 99.1 °F (37.3 °C)        Physical Exam  Vitals reviewed.   Constitutional:       General: He is not in acute distress.     Appearance: Normal appearance. He is well-developed. He is not ill-appearing, toxic-appearing or diaphoretic.   HENT:      Head: Normocephalic and atraumatic.      Right Ear: External ear normal.      Left Ear: External ear normal.      Nose: Nose normal.   Eyes:      Extraocular Movements: Extraocular movements intact.      Conjunctiva/sclera:      Right eye: Right conjunctiva is injected.      Left eye: Left conjunctiva is injected.   Cardiovascular:      Rate and Rhythm: Normal rate and regular rhythm.      Pulses: Normal pulses.      Heart sounds: Normal heart sounds. No murmur heard.     No friction rub. No gallop.   Pulmonary:      Effort: Pulmonary effort is normal. No respiratory distress.      Breath sounds: No stridor. Rales (bilateral bases) present. No wheezing or rhonchi.   Musculoskeletal:      Right lower leg: No edema.      Left lower leg: No edema.   Skin:     General: Skin is warm and dry.   Neurological:      General: No  focal deficit present.      Mental Status: He is alert and oriented to person, place, and time. Mental status is at baseline.   Psychiatric:         Mood and Affect: Mood normal.         Behavior: Behavior normal.         Thought Content: Thought content normal.         Judgment: Judgment normal.         Assessment:       1. COVID-19 Active   2. Viral illness Active   3. Chronic kidney disease, stage 4 (severe) Chronic       Plan:     Discussed with patient that molnupiravir is intended to improve his symptoms and make them less severe.  This will not cure his COVID.  He was advised of potential side effects such as GI upset or metallic taste in the mouth.  He should rest and hydrate well.  He can use over-the-counter medications such as Tylenol or Flonase nasal spray as needed for symptom relief.  If at any time his symptoms worsen, he was advised to go to the ER for further evaluation and treatment.  Patient understands and agrees with plan.  He will remain at home through day 5 of illness.  Then he will return to work wearing a mask for days 6 through 10 of illness. After that, can return to normal routine.     COVID-19  -     molnupiravir 200 mg capsule (EUA); Take 4 capsules (800 mg total) by mouth every 12 (twelve) hours. for 5 days  Dispense: 40 capsule; Refill: 0    Viral illness  Comments:  Covid positive, flu negative   Orders:  -     POCT COVID-19 Rapid Screening  -     POCT Influenza A/B Molecular    Chronic kidney disease, stage 4 (severe)  Comments:  Cannot prescribe paxlovid         RTC PRN    Warning signs discussed, patient to call with any further issues or worsening of symptoms.       Parts of the above note were dictated using a voice dictation software. Please excuse any grammatical or typographical errors.

## 2023-09-26 NOTE — LETTER
September 26, 2023      [facility]/Pediatrics  [address]  [place] [address]  Phone: [phone]  Fax: [phone]       Patient: Domingo Gross   YOB: 1960  Date of Visit: 09/26/2023    To Whom It May Concern:    Martha Gross  was at [facility] on 09/26/2023. Please excuse him from work 9/26/23-10/2/23. He can return on 10/2/23 but must wear a mask until 10/5/23. If you have any questions or concerns, or if I can be of further assistance, please do not hesitate to contact me.    Sincerely,        Analy Encarnacion MD

## 2023-09-27 ENCOUNTER — PATIENT MESSAGE (OUTPATIENT)
Dept: INTERNAL MEDICINE | Facility: CLINIC | Age: 63
End: 2023-09-27
Payer: COMMERCIAL

## 2023-09-29 ENCOUNTER — PATIENT MESSAGE (OUTPATIENT)
Dept: INTERNAL MEDICINE | Facility: CLINIC | Age: 63
End: 2023-09-29
Payer: COMMERCIAL

## 2023-10-03 ENCOUNTER — PATIENT MESSAGE (OUTPATIENT)
Dept: INTERNAL MEDICINE | Facility: CLINIC | Age: 63
End: 2023-10-03
Payer: COMMERCIAL

## 2023-10-04 ENCOUNTER — OFFICE VISIT (OUTPATIENT)
Dept: INTERNAL MEDICINE | Facility: CLINIC | Age: 63
End: 2023-10-04
Payer: COMMERCIAL

## 2023-10-04 ENCOUNTER — PATIENT MESSAGE (OUTPATIENT)
Dept: INTERNAL MEDICINE | Facility: CLINIC | Age: 63
End: 2023-10-04
Payer: COMMERCIAL

## 2023-10-04 VITALS
WEIGHT: 177.25 LBS | SYSTOLIC BLOOD PRESSURE: 136 MMHG | HEIGHT: 70 IN | OXYGEN SATURATION: 98 % | DIASTOLIC BLOOD PRESSURE: 72 MMHG | HEART RATE: 66 BPM | BODY MASS INDEX: 25.38 KG/M2

## 2023-10-04 DIAGNOSIS — J44.1 COPD EXACERBATION: Primary | ICD-10-CM

## 2023-10-04 PROCEDURE — 3008F PR BODY MASS INDEX (BMI) DOCUMENTED: ICD-10-PCS | Mod: CPTII,S$GLB,, | Performed by: INTERNAL MEDICINE

## 2023-10-04 PROCEDURE — 99999 PR PBB SHADOW E&M-EST. PATIENT-LVL IV: ICD-10-PCS | Mod: PBBFAC,,, | Performed by: INTERNAL MEDICINE

## 2023-10-04 PROCEDURE — 96372 THER/PROPH/DIAG INJ SC/IM: CPT | Mod: S$GLB,,, | Performed by: INTERNAL MEDICINE

## 2023-10-04 PROCEDURE — 99214 PR OFFICE/OUTPT VISIT, EST, LEVL IV, 30-39 MIN: ICD-10-PCS | Mod: 25,S$GLB,, | Performed by: INTERNAL MEDICINE

## 2023-10-04 PROCEDURE — 3044F PR MOST RECENT HEMOGLOBIN A1C LEVEL <7.0%: ICD-10-PCS | Mod: CPTII,S$GLB,, | Performed by: INTERNAL MEDICINE

## 2023-10-04 PROCEDURE — 3075F PR MOST RECENT SYSTOLIC BLOOD PRESS GE 130-139MM HG: ICD-10-PCS | Mod: CPTII,S$GLB,, | Performed by: INTERNAL MEDICINE

## 2023-10-04 PROCEDURE — 3075F SYST BP GE 130 - 139MM HG: CPT | Mod: CPTII,S$GLB,, | Performed by: INTERNAL MEDICINE

## 2023-10-04 PROCEDURE — 3078F PR MOST RECENT DIASTOLIC BLOOD PRESSURE < 80 MM HG: ICD-10-PCS | Mod: CPTII,S$GLB,, | Performed by: INTERNAL MEDICINE

## 2023-10-04 PROCEDURE — 3078F DIAST BP <80 MM HG: CPT | Mod: CPTII,S$GLB,, | Performed by: INTERNAL MEDICINE

## 2023-10-04 PROCEDURE — 96372 PR INJECTION,THERAP/PROPH/DIAG2ST, IM OR SUBCUT: ICD-10-PCS | Mod: S$GLB,,, | Performed by: INTERNAL MEDICINE

## 2023-10-04 PROCEDURE — 1160F RVW MEDS BY RX/DR IN RCRD: CPT | Mod: CPTII,S$GLB,, | Performed by: INTERNAL MEDICINE

## 2023-10-04 PROCEDURE — 3008F BODY MASS INDEX DOCD: CPT | Mod: CPTII,S$GLB,, | Performed by: INTERNAL MEDICINE

## 2023-10-04 PROCEDURE — 1159F PR MEDICATION LIST DOCUMENTED IN MEDICAL RECORD: ICD-10-PCS | Mod: CPTII,S$GLB,, | Performed by: INTERNAL MEDICINE

## 2023-10-04 PROCEDURE — 99999 PR PBB SHADOW E&M-EST. PATIENT-LVL IV: CPT | Mod: PBBFAC,,, | Performed by: INTERNAL MEDICINE

## 2023-10-04 PROCEDURE — 1159F MED LIST DOCD IN RCRD: CPT | Mod: CPTII,S$GLB,, | Performed by: INTERNAL MEDICINE

## 2023-10-04 PROCEDURE — 94640 PR INHAL RX, AIRWAY OBST/DX SPUTUM INDUCT: ICD-10-PCS | Mod: 59,S$GLB,, | Performed by: INTERNAL MEDICINE

## 2023-10-04 PROCEDURE — 99214 OFFICE O/P EST MOD 30 MIN: CPT | Mod: 25,S$GLB,, | Performed by: INTERNAL MEDICINE

## 2023-10-04 PROCEDURE — 1160F PR REVIEW ALL MEDS BY PRESCRIBER/CLIN PHARMACIST DOCUMENTED: ICD-10-PCS | Mod: CPTII,S$GLB,, | Performed by: INTERNAL MEDICINE

## 2023-10-04 PROCEDURE — 3044F HG A1C LEVEL LT 7.0%: CPT | Mod: CPTII,S$GLB,, | Performed by: INTERNAL MEDICINE

## 2023-10-04 PROCEDURE — 94640 AIRWAY INHALATION TREATMENT: CPT | Mod: 59,S$GLB,, | Performed by: INTERNAL MEDICINE

## 2023-10-04 RX ORDER — PREDNISONE 20 MG/1
TABLET ORAL
Qty: 20 TABLET | Refills: 0 | Status: ON HOLD | OUTPATIENT
Start: 2023-10-05 | End: 2024-02-09 | Stop reason: HOSPADM

## 2023-10-04 RX ORDER — DOXYCYCLINE 100 MG/1
100 CAPSULE ORAL 2 TIMES DAILY
Qty: 20 CAPSULE | Refills: 0 | Status: SHIPPED | OUTPATIENT
Start: 2023-10-04 | End: 2023-10-14

## 2023-10-04 RX ORDER — IPRATROPIUM BROMIDE AND ALBUTEROL SULFATE 2.5; .5 MG/3ML; MG/3ML
3 SOLUTION RESPIRATORY (INHALATION) EVERY 6 HOURS PRN
Qty: 75 ML | Refills: 3 | Status: SHIPPED | OUTPATIENT
Start: 2023-10-04 | End: 2024-10-03

## 2023-10-04 RX ORDER — IPRATROPIUM BROMIDE AND ALBUTEROL SULFATE 2.5; .5 MG/3ML; MG/3ML
3 SOLUTION RESPIRATORY (INHALATION)
Status: COMPLETED | OUTPATIENT
Start: 2023-10-04 | End: 2023-10-04

## 2023-10-04 RX ADMIN — IPRATROPIUM BROMIDE AND ALBUTEROL SULFATE 3 ML: 2.5; .5 SOLUTION RESPIRATORY (INHALATION) at 03:10

## 2023-10-04 NOTE — TELEPHONE ENCOUNTER
Called patient to schedule appt with them. Scheduled over the phone with patient.    Patient called the clinic requesting refills for HYDROcodone-acetaminophen (NORCO) 7.5-325 MG per tablet.  Patient has 2 day supply left at home at this time.  Patient reports that the pharmacy gave her 178 tablets at last refill instead of 180 tablets.  Please call patient at [phone] when the refills have been authorized.    Requested Prescriptions   Pending Prescriptions Disp Refills     HYDROcodone-acetaminophen (NORCO) 7.5-325 MG per tablet 180 tablet 0     Sig: Take 2 tablets by mouth 3 times daily 28 days or more between refills for controlled medications       There is no refill protocol information for this order        Routing refill request to provider for review/approval because:  Drug not on the [facility] refill protocol

## 2023-10-04 NOTE — PROGRESS NOTES
Patient ID: Domingo Gross is a 63 y.o. male.    Chief Complaint: Cough    MEJIA Chirinos is a 63 y.o. male with COPD presents with chief complaint of cough. Onset was 10 days ago. He saw me 8 days ago and was diagnosed with covid infection. He was treated with molnupiravir. Overall symptoms have improved, except his cough. He still has lingering cough with associated symptoms of runny nose, wheezing and worsening of his chronic shortness of breath. No fever. He has used over the counter robitussin but with no relief. Nothing relieving symptoms currenly. Symptoms are constant in duration.     Review of Systems   HENT:  Positive for rhinorrhea.    Respiratory:  Positive for cough and wheezing.    All other systems reviewed and are negative.     Objective:     Vitals:    10/04/23 1423   BP: 136/72   Pulse: 66        Physical Exam  Vitals reviewed.   Constitutional:       General: He is not in acute distress.     Appearance: Normal appearance. He is well-developed. He is not ill-appearing, toxic-appearing or diaphoretic.   HENT:      Head: Normocephalic and atraumatic.      Right Ear: External ear normal.      Left Ear: External ear normal.      Nose: Nose normal.   Eyes:      Extraocular Movements: Extraocular movements intact.      Conjunctiva/sclera: Conjunctivae normal.   Cardiovascular:      Rate and Rhythm: Normal rate and regular rhythm.      Pulses: Normal pulses.      Heart sounds: Normal heart sounds. No murmur heard.     No friction rub. No gallop.   Pulmonary:      Effort: Pulmonary effort is normal. No respiratory distress.      Breath sounds: No stridor. No wheezing, rhonchi or rales.      Comments: Diminished breath sounds in all lung fields  Musculoskeletal:      Right lower leg: No edema.      Left lower leg: No edema.   Skin:     General: Skin is warm and dry.   Neurological:      General: No focal deficit present.      Mental Status: He is alert and oriented to person, place, and time. Mental status  is at baseline.   Psychiatric:         Mood and Affect: Mood normal.         Behavior: Behavior normal.         Thought Content: Thought content normal.         Judgment: Judgment normal.         Assessment:       1. COPD exacerbation Active       Plan:     Patient re-examined after duoneb treatment and steroid shot. He had significantly improved air movement in all lung fields and lungs were clear to auscultation. He was instructed to alert me for any worsening symptoms or development of new symptoms such as fever.     COPD exacerbation  -     albuterol-ipratropium 2.5 mg-0.5 mg/3 mL nebulizer solution 3 mL  -     methylPREDNISolone sod suc(PF) injection 125 mg  -     doxycycline (MONODOX) 100 MG capsule; Take 1 capsule (100 mg total) by mouth 2 (two) times daily. for 10 days  Dispense: 20 capsule; Refill: 0  -     NEBULIZER KIT (SUPPLIES) FOR HOME USE  -     NEBULIZER FOR HOME USE  -     albuterol-ipratropium (DUO-NEB) 2.5 mg-0.5 mg/3 mL nebulizer solution; Take 3 mLs by nebulization every 6 (six) hours as needed for Wheezing or Shortness of Breath (or cough). Rescue  Dispense: 75 mL; Refill: 3  -     predniSONE (DELTASONE) 20 MG tablet; Take 3 pills daily for 3 days, then 2 pills daily for 3 days, then 1 pill daily for 3 days, then 1/2 pill daily for 4 days. Start 10/5/23  Dispense: 20 tablet; Refill: 0        RTC PRN    Warning signs discussed, patient to call with any further issues or worsening of symptoms.       Parts of the above note were dictated using a voice dictation software. Please excuse any grammatical or typographical errors.

## 2023-10-06 NOTE — TELEPHONE ENCOUNTER
Refill Routing Note   Medication(s) are not appropriate for processing by [facility] for the following reason(s):      Due for refill >6 months ago    [facility] action(s):  Defer Care Due:  None identified            Appointments  past 12m or future 3m with PCP    Date Provider   Last Visit   10/4/2023 Analy Encarnacion MD   Next Visit   3/5/2024 Analy Encarnacion MD   ED visits in past 90 days: 0        Note composed:2:59 PM 10/06/2023

## 2023-10-06 NOTE — TELEPHONE ENCOUNTER
No care due was identified.  [facility] Embedded Care Due Messages. Reference number: [id number].   10/06/2023 1:52:49 PM CDT

## 2023-10-09 RX ORDER — ALBUTEROL SULFATE 90 UG/1
2 AEROSOL, METERED RESPIRATORY (INHALATION) EVERY 6 HOURS PRN
Qty: 54 G | Refills: 3 | Status: SHIPPED | OUTPATIENT
Start: 2023-10-09

## 2023-10-25 NOTE — PLAN OF CARE
2 cc of air removed from R radial vasc band. No hematoma or bleeding noted. +2 mag radial pulses palpated. Skin is normal in color, warm to touch, < 3 sec cap refill. VSS. Will continue to monitor pt for safety and needs.   
Patient discharged to home as ordered after 2 hour recovery per Dr. SEMAJ Matos. Pt is AAOx4, VSS, denies any pain.  All discharge instructions and printed materials reviewed and given to patient.   Patient instructed on follow-up appointment as ordered.   Prescriptions delivered to bedside by pharmacy and reviewed with patient.   Patient verbalized understanding and agreement with all discharge instructions given.     Right radial gauze and tegaderm dressing c.d.i. No bleeding or hematoma noted around site. Site and surrounding area is soft.    Palpated +2 mag radial pulses. Dopplered mag pedal pulses.   Pt ate lunch tray. No n/v.   Pt got dressed and moving around without difficulty and no distress noted.  Pt in w/c. Left hand  20 gauge iv dc'd as ordered with tip intact. Gauze and koban dressing applied c.d.i.  Pt discharged home via wheelchair to tax with wife in attendance.  
Patient transfered to cath lab bay # 5 via stretcher with side rails up x2. AAOx4 and able to follow commands. Pt is stable when connecting to cardiac monitors. VSS.   Right radial vasc band in place with 12cc of air in band. Site is CDI. No redness, bruising, or hematoma noted around site. +2 mag radial pulses palpated. Palpated mag pedal pulses. Skin normal in color and warm to touch, <3 sec cap refill.  Fall risk precautions given and patient acknowledges.  AIDET completed to pt. Updated pt's wife in sx waiting room. Will continue to monitor patient.    
Wife at the bedside to participate in AVS teaching and medication review.  
25-Oct-2023 12:27

## 2023-10-27 ENCOUNTER — TELEPHONE (OUTPATIENT)
Dept: INFUSION THERAPY | Facility: HOSPITAL | Age: 63
End: 2023-10-27
Payer: COMMERCIAL

## 2023-11-05 ENCOUNTER — PATIENT MESSAGE (OUTPATIENT)
Dept: CARDIOLOGY | Facility: CLINIC | Age: 63
End: 2023-11-05
Payer: COMMERCIAL

## 2023-11-13 ENCOUNTER — TELEPHONE (OUTPATIENT)
Dept: INFUSION THERAPY | Facility: HOSPITAL | Age: 63
End: 2023-11-13
Payer: COMMERCIAL

## 2023-11-13 NOTE — TELEPHONE ENCOUNTER
Called pt concerning no show for appointment today at 2:30 for retacrit injection. Pt states he completely forgot. Pt asked to please reschedule to a Monday. Pt scheduled for Monday 11/20 at 12 noon

## 2023-11-14 ENCOUNTER — LAB VISIT (OUTPATIENT)
Dept: LAB | Facility: HOSPITAL | Age: 63
End: 2023-11-14
Attending: INTERNAL MEDICINE
Payer: COMMERCIAL

## 2023-11-14 DIAGNOSIS — N18.4 ANEMIA DUE TO STAGE 4 CHRONIC KIDNEY DISEASE: ICD-10-CM

## 2023-11-14 DIAGNOSIS — D63.1 ANEMIA DUE TO STAGE 4 CHRONIC KIDNEY DISEASE: ICD-10-CM

## 2023-11-14 DIAGNOSIS — N18.4 CKD (CHRONIC KIDNEY DISEASE) STAGE 4, GFR 15-29 ML/MIN: ICD-10-CM

## 2023-11-14 LAB
BASOPHILS # BLD AUTO: 0.06 K/UL (ref 0–0.2)
BASOPHILS NFR BLD: 1.1 % (ref 0–1.9)
DIFFERENTIAL METHOD: ABNORMAL
EOSINOPHIL # BLD AUTO: 0.2 K/UL (ref 0–0.5)
EOSINOPHIL NFR BLD: 2.8 % (ref 0–8)
ERYTHROCYTE [DISTWIDTH] IN BLOOD BY AUTOMATED COUNT: 14 % (ref 11.5–14.5)
HCT VFR BLD AUTO: 25.5 % (ref 40–54)
HGB BLD-MCNC: 8.3 G/DL (ref 14–18)
IMM GRANULOCYTES # BLD AUTO: 0.01 K/UL (ref 0–0.04)
IMM GRANULOCYTES NFR BLD AUTO: 0.2 % (ref 0–0.5)
LYMPHOCYTES # BLD AUTO: 1 K/UL (ref 1–4.8)
LYMPHOCYTES NFR BLD: 19.2 % (ref 18–48)
MCH RBC QN AUTO: 29.9 PG (ref 27–31)
MCHC RBC AUTO-ENTMCNC: 32.5 G/DL (ref 32–36)
MCV RBC AUTO: 92 FL (ref 82–98)
MONOCYTES # BLD AUTO: 0.7 K/UL (ref 0.3–1)
MONOCYTES NFR BLD: 12.2 % (ref 4–15)
NEUTROPHILS # BLD AUTO: 3.4 K/UL (ref 1.8–7.7)
NEUTROPHILS NFR BLD: 64.5 % (ref 38–73)
NRBC BLD-RTO: 0 /100 WBC
PLATELET # BLD AUTO: 172 K/UL (ref 150–450)
PMV BLD AUTO: 11.5 FL (ref 9.2–12.9)
RBC # BLD AUTO: 2.78 M/UL (ref 4.6–6.2)
WBC # BLD AUTO: 5.32 K/UL (ref 3.9–12.7)

## 2023-11-14 PROCEDURE — 36415 COLL VENOUS BLD VENIPUNCTURE: CPT | Mod: PO | Performed by: INTERNAL MEDICINE

## 2023-11-14 PROCEDURE — 85025 COMPLETE CBC W/AUTO DIFF WBC: CPT | Performed by: INTERNAL MEDICINE

## 2023-12-01 ENCOUNTER — OFFICE VISIT (OUTPATIENT)
Dept: CARDIOLOGY | Facility: CLINIC | Age: 63
End: 2023-12-01
Payer: COMMERCIAL

## 2023-12-01 VITALS
BODY MASS INDEX: 26.48 KG/M2 | DIASTOLIC BLOOD PRESSURE: 68 MMHG | HEIGHT: 70 IN | WEIGHT: 185 LBS | HEART RATE: 52 BPM | SYSTOLIC BLOOD PRESSURE: 125 MMHG

## 2023-12-01 DIAGNOSIS — I25.111 CORONARY ARTERY DISEASE INVOLVING NATIVE CORONARY ARTERY OF NATIVE HEART WITH ANGINA PECTORIS WITH DOCUMENTED SPASM: ICD-10-CM

## 2023-12-01 DIAGNOSIS — I10 PRIMARY HYPERTENSION: ICD-10-CM

## 2023-12-01 DIAGNOSIS — I48.19 PERSISTENT ATRIAL FIBRILLATION: Primary | ICD-10-CM

## 2023-12-01 DIAGNOSIS — N18.4 ANEMIA DUE TO STAGE 4 CHRONIC KIDNEY DISEASE: ICD-10-CM

## 2023-12-01 DIAGNOSIS — Z01.818 PRE-OP TESTING: ICD-10-CM

## 2023-12-01 DIAGNOSIS — D63.1 ANEMIA DUE TO STAGE 4 CHRONIC KIDNEY DISEASE: ICD-10-CM

## 2023-12-01 DIAGNOSIS — I70.213 ATHEROSCLEROSIS OF NATIVE ARTERY OF BOTH LOWER EXTREMITIES WITH INTERMITTENT CLAUDICATION: ICD-10-CM

## 2023-12-01 DIAGNOSIS — J44.9 CHRONIC OBSTRUCTIVE PULMONARY DISEASE, UNSPECIFIED COPD TYPE: Chronic | ICD-10-CM

## 2023-12-01 DIAGNOSIS — N18.4 CKD (CHRONIC KIDNEY DISEASE) STAGE 4, GFR 15-29 ML/MIN: ICD-10-CM

## 2023-12-01 DIAGNOSIS — I48.19 PERSISTENT ATRIAL FIBRILLATION: ICD-10-CM

## 2023-12-01 PROCEDURE — 99214 PR OFFICE/OUTPT VISIT, EST, LEVL IV, 30-39 MIN: ICD-10-PCS | Mod: S$GLB,,, | Performed by: INTERNAL MEDICINE

## 2023-12-01 PROCEDURE — 99999 PR PBB SHADOW E&M-EST. PATIENT-LVL III: CPT | Mod: PBBFAC,,, | Performed by: INTERNAL MEDICINE

## 2023-12-01 PROCEDURE — 3008F PR BODY MASS INDEX (BMI) DOCUMENTED: ICD-10-PCS | Mod: CPTII,S$GLB,, | Performed by: INTERNAL MEDICINE

## 2023-12-01 PROCEDURE — 3008F BODY MASS INDEX DOCD: CPT | Mod: CPTII,S$GLB,, | Performed by: INTERNAL MEDICINE

## 2023-12-01 PROCEDURE — 99999 PR PBB SHADOW E&M-EST. PATIENT-LVL III: ICD-10-PCS | Mod: PBBFAC,,, | Performed by: INTERNAL MEDICINE

## 2023-12-01 PROCEDURE — 1159F MED LIST DOCD IN RCRD: CPT | Mod: CPTII,S$GLB,, | Performed by: INTERNAL MEDICINE

## 2023-12-01 PROCEDURE — 3074F SYST BP LT 130 MM HG: CPT | Mod: CPTII,S$GLB,, | Performed by: INTERNAL MEDICINE

## 2023-12-01 PROCEDURE — 3044F HG A1C LEVEL LT 7.0%: CPT | Mod: CPTII,S$GLB,, | Performed by: INTERNAL MEDICINE

## 2023-12-01 PROCEDURE — 3078F DIAST BP <80 MM HG: CPT | Mod: CPTII,S$GLB,, | Performed by: INTERNAL MEDICINE

## 2023-12-01 PROCEDURE — 3044F PR MOST RECENT HEMOGLOBIN A1C LEVEL <7.0%: ICD-10-PCS | Mod: CPTII,S$GLB,, | Performed by: INTERNAL MEDICINE

## 2023-12-01 PROCEDURE — 3078F PR MOST RECENT DIASTOLIC BLOOD PRESSURE < 80 MM HG: ICD-10-PCS | Mod: CPTII,S$GLB,, | Performed by: INTERNAL MEDICINE

## 2023-12-01 PROCEDURE — 99214 OFFICE O/P EST MOD 30 MIN: CPT | Mod: S$GLB,,, | Performed by: INTERNAL MEDICINE

## 2023-12-01 PROCEDURE — 3074F PR MOST RECENT SYSTOLIC BLOOD PRESSURE < 130 MM HG: ICD-10-PCS | Mod: CPTII,S$GLB,, | Performed by: INTERNAL MEDICINE

## 2023-12-01 PROCEDURE — 1159F PR MEDICATION LIST DOCUMENTED IN MEDICAL RECORD: ICD-10-PCS | Mod: CPTII,S$GLB,, | Performed by: INTERNAL MEDICINE

## 2023-12-01 RX ORDER — CARVEDILOL 25 MG/1
25 TABLET ORAL 2 TIMES DAILY WITH MEALS
Qty: 60 TABLET | Refills: 11 | Status: CANCELLED | OUTPATIENT
Start: 2023-12-01 | End: 2024-11-30

## 2023-12-01 NOTE — PROGRESS NOTES
Subjective:    Patient ID:  Domingo Gross is a 63 y.o. male who presents for evaluation of No chief complaint on file.    Referring Cardiologist: Keo Jenkins MD  Primary Care Physician: Analy Encarnacion MD    HPI  Prior Hx:  I had the pleasure of seeing Mr. Gross today in our electrophysiology clinic in consultation for his atrial arrhythmia. As you are aware he is a pleasant 62 year-old man with hypertension, chronic kidney disease stage IV, peripheral arterial disease with multiple interventions on lower extremity arteries, coronary artery disease (mid LAD/prox RCA PCI 3/2019 and prox LCA in 10/2019 following out of hospital cardiac arrest), cardiac arrest in setting of prox LCX occlusion treated with PCI with normal LV function, chronic anemia reportedly secondary to chronic renal disease although has had heme + stools and EGD 1/2022 noted non-bleeding erosive gastropathy and colonoscopy noted hemorrhoids and polyps, and paroxysmal atrial fibrillation. He was first diagnosed with atrial fibrillation post-resuscitation for his out-of-hospital cardiac arrest (assume VF, required in field shock/CPR). He spontaneously converted. There is no ECG however of this in the system. Coronary angiography disclosed 99% prox-LCX stenosis.  He was hospitalized for chest discomfort and shortness of breath in November of 2022. He was observed to be in atrial fibrillation. He was not started on anticoagulation as his Hgb was 6.4. He was transfused. He is on plavix and aspirin. A follow-up outpatient ECG noted sinus rhythm.    ECHO 11/2022: preserved LV function, mild MR, normal LA size    I reviewed all available ECGs in Epic which show either sinus rhythm of AF.    At our initial visit in November of 2022 we discussed that his anemia has been felt to be in the setting of chronic renal disease and not bleeding. He was being treated now with EPO injections. We resumed eliquis. Plan was to monitor him for a bit to ensure he  was tolerating eliquis then would discuss DCCV.    3/2023: Mr. Gross returned for follow-up. Recently spoke with him on the phone regarding confusion on whether he was on eliquis or not. His wife confirmed he was on eliquis and aspirin. He has been having angina. Dr. Jenkins has a stress test ordered. He was initiated on Imdur. His Hgb has been stable (9-10).    5/2023: Underwent LCX PCI with Dr. Jenkins.    6/2023: Underwent JOSE/DCCV with Dr. Jenkins. Initiated on amiodarone.    Interim Hx:  Mr. Gross returns for follow-up. Reports feeling better since his cardioversion. Currently having leg discomfort with ambulation. He is going to establish care with Dr. Moran.    My interpretation of today's in clinic ECG is sinus rhythm with a rate of 52 bpm.    Review of Systems   Constitutional: Negative for fever and malaise/fatigue.   HENT:  Negative for congestion and sore throat.    Eyes:  Negative for blurred vision and visual disturbance.   Cardiovascular:  Negative for chest pain, dyspnea on exertion, irregular heartbeat, near-syncope, palpitations and syncope.   Respiratory:  Negative for cough and shortness of breath.    Hematologic/Lymphatic: Negative for bleeding problem. Does not bruise/bleed easily.   Skin: Negative.    Musculoskeletal: Negative.    Gastrointestinal:  Negative for bloating, abdominal pain, hematochezia and melena.   Neurological:  Negative for focal weakness and weakness.   Psychiatric/Behavioral: Negative.          Objective:    Physical Exam  Vitals reviewed.   Constitutional:       General: He is not in acute distress.     Appearance: He is well-developed. He is not diaphoretic.   HENT:      Head: Normocephalic and atraumatic.   Eyes:      General:         Right eye: No discharge.         Left eye: No discharge.      Conjunctiva/sclera: Conjunctivae normal.   Cardiovascular:      Rate and Rhythm: Regular rhythm. Bradycardia present.      Heart sounds: No murmur heard.     No friction rub.  No gallop.   Pulmonary:      Effort: Pulmonary effort is normal. No respiratory distress.      Breath sounds: Normal breath sounds. No wheezing or rales.   Abdominal:      General: Bowel sounds are normal. There is no distension.      Palpations: Abdomen is soft.      Tenderness: There is no abdominal tenderness.   Musculoskeletal:      Cervical back: Neck supple.   Skin:     General: Skin is warm and dry.   Neurological:      Mental Status: He is alert and oriented to person, place, and time.   Psychiatric:         Behavior: Behavior normal.         Thought Content: Thought content normal.         Judgment: Judgment normal.           Assessment:       1. Persistent atrial fibrillation    2. Primary hypertension    3. Atherosclerosis of native artery of both lower extremities with intermittent claudication    4. Coronary artery disease involving native coronary artery of native heart with angina pectoris with documented spasm    5. Chronic obstructive pulmonary disease, unspecified COPD type    6. CKD (chronic kidney disease) stage 4, GFR 15-29 ml/min         Plan:       In summary, Mr. Gross is a pleasant 63 year-old man with hypertension, chronic kidney disease stage IV, peripheral arterial disease with multiple interventions on lower extremity arteries, coronary artery disease (mid LAD/prox RCA PCI 3/2019 and prox LCA in 10/2019 following out of hospital cardiac arrest), cardiac arrest in setting of prox LCX occlusion treated with PCI with normal LV function, chronic anemia reportedly secondary to chronic renal disease although has had heme + stools and EGD 1/2022 noted non-bleeding erosive gastropathy and colonoscopy noted hemorrhoids and polyps, and symptomatic persistent atrial fibrillation. He underwent JOSE/DCCV with Dr. Jenkins in June 2023 and has remained in sinus rhythm on amiodarone with symptomatic benefit. Discussed long-term issues with amiodarone. Discussed switching to multaq. He elects to stay on  amiodarone. Has upcoming LFTs/TSH.    RTC in 6 months    Thank you for allowing me to participate in the care of this patient. Please do not hesitate to call me with any questions or concerns.    Dmitriy Chavarria MD, PhD  Cardiac Electrophysiology

## 2023-12-07 ENCOUNTER — PATIENT MESSAGE (OUTPATIENT)
Dept: CARDIOLOGY | Facility: CLINIC | Age: 63
End: 2023-12-07
Payer: COMMERCIAL

## 2023-12-12 ENCOUNTER — LAB VISIT (OUTPATIENT)
Dept: LAB | Facility: HOSPITAL | Age: 63
End: 2023-12-12
Attending: INTERNAL MEDICINE
Payer: COMMERCIAL

## 2023-12-12 DIAGNOSIS — N18.4 ANEMIA DUE TO STAGE 4 CHRONIC KIDNEY DISEASE: ICD-10-CM

## 2023-12-12 DIAGNOSIS — D63.1 ANEMIA DUE TO STAGE 4 CHRONIC KIDNEY DISEASE: ICD-10-CM

## 2023-12-12 DIAGNOSIS — N18.4 CKD (CHRONIC KIDNEY DISEASE) STAGE 4, GFR 15-29 ML/MIN: ICD-10-CM

## 2023-12-12 LAB
BASOPHILS # BLD AUTO: 0.04 K/UL (ref 0–0.2)
BASOPHILS NFR BLD: 0.8 % (ref 0–1.9)
DIFFERENTIAL METHOD: ABNORMAL
EOSINOPHIL # BLD AUTO: 0.2 K/UL (ref 0–0.5)
EOSINOPHIL NFR BLD: 4.4 % (ref 0–8)
ERYTHROCYTE [DISTWIDTH] IN BLOOD BY AUTOMATED COUNT: 13.4 % (ref 11.5–14.5)
HCT VFR BLD AUTO: 27.1 % (ref 40–54)
HGB BLD-MCNC: 8.9 G/DL (ref 14–18)
IMM GRANULOCYTES # BLD AUTO: 0.01 K/UL (ref 0–0.04)
IMM GRANULOCYTES NFR BLD AUTO: 0.2 % (ref 0–0.5)
LYMPHOCYTES # BLD AUTO: 1.1 K/UL (ref 1–4.8)
LYMPHOCYTES NFR BLD: 21.2 % (ref 18–48)
MCH RBC QN AUTO: 29.4 PG (ref 27–31)
MCHC RBC AUTO-ENTMCNC: 32.8 G/DL (ref 32–36)
MCV RBC AUTO: 89 FL (ref 82–98)
MONOCYTES # BLD AUTO: 0.6 K/UL (ref 0.3–1)
MONOCYTES NFR BLD: 11 % (ref 4–15)
NEUTROPHILS # BLD AUTO: 3.3 K/UL (ref 1.8–7.7)
NEUTROPHILS NFR BLD: 62.4 % (ref 38–73)
NRBC BLD-RTO: 0 /100 WBC
PLATELET # BLD AUTO: 192 K/UL (ref 150–450)
PMV BLD AUTO: 10.9 FL (ref 9.2–12.9)
RBC # BLD AUTO: 3.03 M/UL (ref 4.6–6.2)
WBC # BLD AUTO: 5.2 K/UL (ref 3.9–12.7)

## 2023-12-12 PROCEDURE — 36415 COLL VENOUS BLD VENIPUNCTURE: CPT | Mod: PO | Performed by: INTERNAL MEDICINE

## 2023-12-12 PROCEDURE — 85025 COMPLETE CBC W/AUTO DIFF WBC: CPT | Performed by: INTERNAL MEDICINE

## 2023-12-12 RX ORDER — CARVEDILOL 25 MG/1
25 TABLET ORAL 2 TIMES DAILY WITH MEALS
Qty: 60 TABLET | Refills: 11 | Status: SHIPPED | OUTPATIENT
Start: 2023-12-12 | End: 2024-03-01 | Stop reason: SDUPTHER

## 2023-12-12 RX ORDER — CARVEDILOL 25 MG/1
25 TABLET ORAL 2 TIMES DAILY WITH MEALS
Qty: 60 TABLET | Refills: 11 | Status: CANCELLED | OUTPATIENT
Start: 2023-12-12 | End: 2024-12-11

## 2023-12-31 ENCOUNTER — HOSPITAL ENCOUNTER (EMERGENCY)
Facility: HOSPITAL | Age: 63
Discharge: HOME | End: 2023-12-31
Attending: STUDENT IN AN ORGANIZED HEALTH CARE EDUCATION/TRAINING PROGRAM
Payer: COMMERCIAL

## 2023-12-31 VITALS
SYSTOLIC BLOOD PRESSURE: 207 MMHG | TEMPERATURE: 99 F | OXYGEN SATURATION: 100 % | BODY MASS INDEX: 26.48 KG/M2 | HEIGHT: 70 IN | DIASTOLIC BLOOD PRESSURE: 91 MMHG | WEIGHT: 185 LBS | RESPIRATION RATE: 18 BRPM | HEART RATE: 52 BPM

## 2023-12-31 DIAGNOSIS — H11.31 SUBCONJUNCTIVAL HEMORRHAGE OF RIGHT EYE: Primary | ICD-10-CM

## 2023-12-31 PROCEDURE — 99283 EMERGENCY DEPT VISIT LOW MDM: CPT

## 2023-12-31 PROCEDURE — 25000003 PHARM REV CODE 250: Performed by: STUDENT IN AN ORGANIZED HEALTH CARE EDUCATION/TRAINING PROGRAM

## 2023-12-31 RX ORDER — CARVEDILOL 25 MG/1
25 TABLET ORAL 2 TIMES DAILY
Status: DISCONTINUED | OUTPATIENT
Start: 2023-12-31 | End: 2024-01-01 | Stop reason: HOSPADM

## 2023-12-31 RX ORDER — ISOSORBIDE MONONITRATE 30 MG/1
60 TABLET, EXTENDED RELEASE ORAL DAILY
Status: DISCONTINUED | OUTPATIENT
Start: 2023-12-31 | End: 2024-01-01 | Stop reason: HOSPADM

## 2023-12-31 RX ADMIN — CARVEDILOL 25 MG: 25 TABLET, FILM COATED ORAL at 10:12

## 2023-12-31 RX ADMIN — ISOSORBIDE MONONITRATE 60 MG: 30 TABLET, EXTENDED RELEASE ORAL at 10:12

## 2024-01-01 NOTE — ED NOTES
Pt presents with redness and scleral hemorrhage in right eye that he noticed this AM. Pt does not recall any trauma to eye. No loss of or blurry vision. Pt presents with HTN in triage and states that he is complaint with HTN medication BID, last dose this AM.    Psych: No acute distress is noted. Pt is calm and cooperative, good eye contact.    HEENT: Redness and scleral hemorrhage to right eye. Denies HEENT complaint or injury    LOC: The patient is awake, alert and aware of environment with an appropriate affect.    APPEARANCE: Patient is clean and well groomed    SKIN: The skin is warm, dry and intact. Patient has normal skin turgor and moist mucus membranes, no rashes or lesions. No Breakdown noted.    MUSCULOSKELETAL:  Normal range of motion noted. Moves all extremities well, No swelling, deformity or tenderness noted.    RESPIRATORY: Airway is open and patent, respirations are spontaneous; patient has a normal effort and rate. Pink nailbeds.     CARDIAC: Patient has a normal rate. Skin warm and dry.     NEUROLOGIC:   Follows commands without difficulty. Speech is clear. No neuro deficits observed.

## 2024-01-01 NOTE — ED PROVIDER NOTES
ED Provider Note - 12/31/2023    History     Chief Complaint   Patient presents with    Eye Problem     Pt reports to ED with c/o blood to right sclera that he noticed when he woke up this morning. Pt reports dog possibly scratched him. Pt denies pain to eye, vision changes, dizziness, drainage.        HPI     Domingo Gross is a 63 y.o. year old male with past medical and surgical history as seen below, presenting with chief complaint of blood noticed to right eye.  Wife noticed this several hours ago.  Patient was experiencing no pain, irritation, change in vision.    Past Medical History:   Diagnosis Date    Allergy     Anemia     Anticoagulant long-term use     Arthritis     CKD (chronic kidney disease) stage 4, GFR 15-29 ml/min     COPD (chronic obstructive pulmonary disease) 08/20/2021    Coronary artery disease     Heart attack 10/04/2019    Hematuria     Hemothorax     Hyperlipidemia     Hypertension     Hyperuricemia     Hypocalcemia     Hypokalemia     Hypophosphatemia     Hypothyroidism 3/2/2023    PAD (peripheral artery disease)     Proteinuria     Vitamin D deficiency      Past Surgical History:   Procedure Laterality Date    ABDOMINAL AORTOGRAPHY N/A 5/10/2019    Procedure: AORTOGRAM-ABDOMINAL;  Surgeon: Keo Jenkins MD;  Location: [facility] CATH LAB/EP;  Service: Cardiology;  Laterality: N/A;  RSFA intervention     AORTOGRAPHY WITH SERIALOGRAPHY N/A 12/3/2021    Procedure: AORTOGRAM, WITH SERIALOGRAPHY;  Surgeon: Keo Jenkins MD;  Location: [facility] CATH LAB/EP;  Service: Cardiology;  Laterality: N/A;    AORTOGRAPHY WITH SERIALOGRAPHY N/A 4/1/2022    Procedure: AORTOGRAM, WITH SERIALOGRAPHY;  Surgeon: Keo Jenkins MD;  Location: [facility] CATH LAB/EP;  Service: Cardiology;  Laterality: N/A;    ATHERECTOMY, CORONARY N/A 4/25/2023    Procedure: Atherectomy-coronary;  Surgeon: Keo Jenkins MD;  Location: [facility] CATH LAB/EP;  Service: Cardiology;  Laterality: N/A;    BONE MARROW BIOPSY Right 10/12/2021     Procedure: BIOPSY-BONE MARROW;  Surgeon: Naseem Woods MD;  Location: [facility] OR;  Service: Oncology;  Laterality: Right;    CARDIAC CATHETERIZATION      CATARACT EXTRACTION      CATARACT EXTRACTION W/  INTRAOCULAR LENS IMPLANT Right 6/8/2020    Procedure: EXTRACTION, CATARACT, WITH IOL INSERTION;  Surgeon: Tin Light MD;  Location: [facility] OR;  Service: Ophthalmology;  Laterality: Right;    CATARACT EXTRACTION W/  INTRAOCULAR LENS IMPLANT Left 7/2/2020    Procedure: EXTRACTION, CATARACT, WITH IOL INSERTION;  Surgeon: Tin Light MD;  Location: [facility] OR;  Service: Ophthalmology;  Laterality: Left;    COLONOSCOPY N/A 1/6/2022    Procedure: COLONOSCOPY;  Surgeon: Kathe Penaloza MD;  Location: [facility] ENDO (2ND FLR);  Service: Endoscopy;  Laterality: N/A;  COVID test at [facility] on 1/3-GT  okay to hold Plavix for 5 days and aspirin per Dr. Becerra  2nd floor due toextensive cardiac history   instructions mailed and my [facility] portal -     CORONARY ANGIOGRAPHY N/A 3/29/2019    Procedure: ANGIOGRAM, CORONARY ARTERY;  Surgeon: Keo Jenkins MD;  Location: [facility] CATH LAB/EP;  Service: Cardiology;  Laterality: N/A;    CORONARY ANGIOGRAPHY Left 10/11/2019    Procedure: ANGIOGRAM, CORONARY ARTERY;  Surgeon: Keo Jenkins MD;  Location: [facility] CATH LAB/EP;  Service: Cardiology;  Laterality: Left;    CORONARY ANGIOGRAPHY N/A 4/10/2023    Procedure: ANGIOGRAM, CORONARY ARTERY;  Surgeon: Keo Jenkins MD;  Location: [facility] CATH LAB/EP;  Service: Cardiology;  Laterality: N/A;    CORONARY ANGIOPLASTY WITH STENT PLACEMENT  03/29/2019    mid and distal RCA    ESOPHAGOGASTRODUODENOSCOPY N/A 1/6/2022    Procedure: EGD (ESOPHAGOGASTRODUODENOSCOPY);  Surgeon: Kathe Penaloza MD;  Location: [facility] ENDO (2ND FLR);  Service: Endoscopy;  Laterality: N/A;    EYE SURGERY      INSTANTANEOUS WAVE-FREE RATIO (IFR) N/A 4/25/2023    Procedure: Instantaneous Wave-Free Ratio (IFR);  Surgeon: Keo Jenkins MD;  Location: [facility] CATH LAB/EP;  Service:  Cardiology;  Laterality: N/A;    INTRAVASCULAR ULTRASOUND, NON-CORONARY  8/12/2022    Procedure: Intravascular Ultrasound, Non-Coronary;  Surgeon: Keo Jenkins MD;  Location: [facility] CATH LAB/EP;  Service: Cardiology;;    IVUS, CORONARY  4/25/2023    Procedure: IVUS, Coronary;  Surgeon: Keo Jenkins MD;  Location: [facility] CATH LAB/EP;  Service: Cardiology;;    IVUS, CORONARY  5/16/2023    Procedure: IVUS, Coronary;  Surgeon: Keo Jenkins MD;  Location: [facility] CATH LAB/EP;  Service: Cardiology;;  CX    LEFT HEART CATHETERIZATION N/A 3/29/2019    Procedure: Left heart cath;  Surgeon: Keo Jenkins MD;  Location: [facility] CATH LAB/EP;  Service: Cardiology;  Laterality: N/A;    LEFT HEART CATHETERIZATION Left 10/8/2019    Procedure: Left heart cath;  Surgeon: Keo Jenkins MD;  Location: [facility] CATH LAB/EP;  Service: Cardiology;  Laterality: Left;    LEFT HEART CATHETERIZATION Left 4/10/2023    Procedure: Left heart cath;  Surgeon: Keo Jenkins MD;  Location: [facility] CATH LAB/EP;  Service: Cardiology;  Laterality: Left;    LEFT HEART CATHETERIZATION Left 4/25/2023    Procedure: Left heart cath;  Surgeon: Keo Jenkins MD;  Location: [facility] CATH LAB/EP;  Service: Cardiology;  Laterality: Left;    LEFT HEART CATHETERIZATION Left 5/16/2023    Procedure: Left heart cath;  Surgeon: Keo Jenkins MD;  Location: [facility] CATH LAB/EP;  Service: Cardiology;  Laterality: Left;    PERCUTANEOUS TRANSLUMINAL ANGIOPLASTY (PTA) OF PERIPHERAL VESSEL N/A 7/12/2019    Procedure: PTA, PERIPHERAL VESSEL;  Surgeon: Keo Jenkins MD;  Location: [facility] CATH LAB/EP;  Service: Cardiology;  Laterality: N/A;    PERCUTANEOUS TRANSLUMINAL ANGIOPLASTY (PTA) OF PERIPHERAL VESSEL Left 5/20/2022    Procedure: PTA, PERIPHERAL VESSEL;  Surgeon: Keo Jenkins MD;  Location: [facility] CATH LAB/EP;  Service: Cardiology;  Laterality: Left;    PERCUTANEOUS TRANSLUMINAL ANGIOPLASTY (PTA) OF PERIPHERAL VESSEL Right 8/12/2022    Procedure: PTA, PERIPHERAL VESSEL;   Surgeon: Keo Jenkins MD;  Location: [facility] CATH LAB/EP;  Service: Cardiology;  Laterality: Right;    PERCUTANEOUS TRANSLUMINAL BALLOON ANGIOPLASTY OF CORONARY ARTERY  2023    Procedure: Angioplasty-coronary;  Surgeon: Keo Jenkins MD;  Location: [facility] CATH LAB/EP;  Service: Cardiology;;    PTCA, SINGLE VESSEL  2023    Procedure: PTCA, Single Vessel;  Surgeon: Keo Jenkins MD;  Location: [facility] CATH LAB/EP;  Service: Cardiology;;  CX    STENT, DRUG ELUTING, SINGLE VESSEL, CORONARY  2023    Procedure: Stent, Drug Eluting, Single Vessel, Coronary;  Surgeon: Keo Jenkins MD;  Location: [facility] CATH LAB/EP;  Service: Cardiology;;    STENT, DRUG ELUTING, SINGLE VESSEL, CORONARY  2023    Procedure: Stent, Drug Eluting, Single Vessel, Coronary;  Surgeon: Keo Jenkins MD;  Location: [facility] CATH LAB/EP;  Service: Cardiology;;  CX    TRANSESOPHAGEAL ECHOCARDIOGRAM WITH POSSIBLE CARDIOVERSION (JOSE W/ POSS CARDIOVERSION) N/A 2023    Procedure: Transesophageal echo (JOSE) intra-procedure log documentation;  Surgeon: Keo Jenkins MD;  Location: [facility] CATH LAB/EP;  Service: Cardiology;  Laterality: N/A;         Family History   Problem Relation Age of Onset    Aneurysm Mother     Hypertension Mother     Heart disease Mother     Cancer Father     Diabetes Sister     Hypertension Sister     No Known Problems Brother     No Known Problems Son      Social History     Tobacco Use    Smoking status: Former     Current packs/day: 0.00     Types: Cigarettes     Quit date: 1999     Years since quittin.7    Smokeless tobacco: Never   Substance Use Topics    Alcohol use: No     Alcohol/week: 0.0 standard drinks of alcohol    Drug use: No     Social Determinants of Health with Concerns     Tobacco Use: Medium Risk (2023)    Patient History     Smoking Tobacco Use: Former     Smokeless Tobacco Use: Never     Passive Exposure: Not on file   Financial Resource Strain: Medium Risk (2023)     Overall Financial Resource Strain (CARDIA)     Difficulty of Paying Living Expenses: Somewhat hard   Food Insecurity: Food Insecurity Present (11/28/2023)    Hunger Vital Sign     Worried About Running Out of Food in the Last Year: Sometimes true     Ran Out of Food in the Last Year: Often true   Physical Activity: Insufficiently Active (11/28/2023)    Exercise Vital Sign     Days of Exercise per Week: 2 days     Minutes of Exercise per Session: 10 min   Social Connections: Unknown (11/28/2023)    Social Connection and Isolation Panel [NHANES]     Frequency of Communication with Friends and Family: More than three times a week     Frequency of Social Gatherings with Friends and Family: Never     Attends Jewish Services: Not on file     Active Member of Clubs or Organizations: No     Attends Club or Organization Meetings: Never     Marital Status:    Recent Concern: Social Connections - Moderately Isolated (9/8/2023)    Social Connection and Isolation Panel [NHANES]     Frequency of Communication with Friends and Family: Three times a week     Frequency of Social Gatherings with Friends and Family: Never     Attends Jewish Services: Never     Active Member of Clubs or Organizations: No     Attends Club or Organization Meetings: Patient declined     Marital Status:    Housing Stability: Low Risk  (11/28/2023)    Housing Stability Vital Sign     Unable to Pay for Housing in the Last Year: No     Number of Places Lived in the Last Year: 0     Unstable Housing in the Last Year: No   Recent Concern: Housing Stability - High Risk (9/8/2023)    Housing Stability Vital Sign     Unable to Pay for Housing in the Last Year: Yes     Number of Places Lived in the Last Year: 0     Unstable Housing in the Last Year: No      Review of patient's allergies indicates:   Allergen Reactions    Ace inhibitors Rash       Review of Systems     A full Review of Systems (ROS) was performed and was negative unless otherwise  "stated in the HPI.      Physical Exam     Vitals:    12/31/23 2154   BP: (!) 234/99  Comment: pt reports he did not take his BP medication   Patient Position: Sitting   Pulse: (!) 54   Resp: 18   Temp: 98.5 °F (36.9 °C)   TempSrc: Oral   SpO2: 99%   Weight: 83.9 kg (185 lb)   Height: 5' 10" (1.778 m)        Physical Exam    Nursing note and vitals reviewed.  Constitutional: He appears well-developed and well-nourished. No distress.   HENT:   Head: Normocephalic and atraumatic.   Right Ear: External ear normal.   Left Ear: External ear normal.   Nose: Nose normal.   Eyes: EOM are normal. Pupils are equal, round, and reactive to light. Right conjunctiva has a hemorrhage (subconjunctival).   Neck: Neck supple.   Normal range of motion.  Cardiovascular:  Normal rate and regular rhythm.           Pulmonary/Chest: Breath sounds normal. No respiratory distress.   Musculoskeletal:         General: No edema. Normal range of motion.      Cervical back: Normal range of motion and neck supple.     Neurological: He is alert and oriented to person, place, and time. He has normal strength. No cranial nerve deficit or sensory deficit.   Skin: Skin is warm and dry. No rash noted.   Psychiatric: He has a normal mood and affect. Thought content normal.         Lab Results- Independently reviewed by myself      Labs Reviewed - No data to display        Imaging     Imaging Results    None                    ED Course         Procedures         Orders Placed This Encounter    carvediloL tablet 25 mg    isosorbide mononitrate 24 hr tablet 60 mg                      Medical Decision Making       The patient's list of active medical problems, social history, medications, and allergies as documented per RN staff has been reviewed.           Medical Decision Making  63-year-old male presents with small patch of blood in eye to right, lower, lateral aspect of conjunctiva.  Differential includes subconjunctival hemorrhage, corneal abrasion, " corneal ulceration, trauma.  Symptoms seem most consistent with subconjunctival hemorrhage given lack of irritation, pain, vision change.  Patient appears safe and stable for discharge and outpatient follow up.  Advised symptomatic care for any potential irritation with eyedrops.  Also advised follow up with Ophthalmology as needed for any worsening of condition.  Patient also otherwise can return to emergency department for further evaluation.    Risk  Prescription drug management.                  ED Prescriptions    None           Clinical Impression       Follow-up Information       Follow up With Specialties Details Why Contact Info    [facility] OPHTHALMOLOGY Ophthalmology Go to  For follow-up on today's visit., As needed [address] [id number]  [phone]    [facility] Emergency Dept Emergency Medicine Go to  As needed, If symptoms worsen [address] [id number] [phone]            Referrals:  No orders of the defined types were placed in this encounter.      Disposition   ED Disposition Condition    Discharge Stable            Diagnosis    ICD-10-CM ICD-9-CM   1. Subconjunctival hemorrhage of right eye  H11.31 372.72           Avel Ocasio MD        12/31/2023          DISCLAIMER: This note was prepared with [employer] 14 voice recognition transcription software. Garbled syntax, mangled pronouns, and other bizarre constructions may be attributed to that software system.       Avel Ocasio MD  12/31/23 [id number]

## 2024-01-11 NOTE — DISCHARGE SUMMARY
[facility]  Cardiology  Discharge Summary      Patient Name: Domingo Gross Sr.  MRN: [id number]  Admission Date: 7/12/2019  Hospital Length of Stay: 0 days  Discharge Date and Time: 7/12/2019  Attending Physician: No att. providers found  Discharging Provider: Keo Jenkins MD  Primary Care Physician: Analy Encarnacion MD    HPI:       · S/p right CFA revascularization for winsome IIb claudication  · Assisted JOSY approach  · Left radial access for visualization  · 5-6 slender R PT access for delivery of therapy  · Atherectomy with SilverHawk catheter  · PTA with 7.0 x 40 mm Lutonix DCB                 Plan:                      DC home in 2 hours                Continue DAPT with aspirin + plavix                Intense statin therapy                Surveillance ELISABETH + US             Procedure(s) (LRB):  PTA, PERIPHERAL VESSEL (N/A)  Atherectomy, Peripheral Blood Vessel (N/A)  PTA, Femoral Artery  Ultrasound-coronary (N/A)     Indwelling Lines/Drains at time of discharge:  Lines/Drains/Airways          None          Hospital Course see HPI    Consults: none    Significant Diagnostic Studies:     Labs, ecg, and angiogram     Pending Diagnostic Studies:     None          Final Active Diagnoses:    Diagnosis Date Noted POA    PRINCIPAL PROBLEM:  Atherosclerosis of native artery of right lower extremity with intermittent claudication [I70.211] 04/13/2018 Yes    Atherosclerosis of native artery of both lower extremities with intermittent claudication [I70.213] 03/02/2018 Yes    Coronary artery disease involving native coronary artery of native heart with angina pectoris with documented spasm [I25.111] 03/29/2019 Yes    Abnormal cardiovascular stress test [R94.39] 02/27/2019 Yes    Hyperlipidemia LDL goal <70 [E78.5] 06/12/2015 Yes    PAD (peripheral artery disease) [I73.9] 05/21/2014 Yes    HTN (hypertension) [I10] 04/29/2013 Yes      Problems Resolved During this Admission:       Discharged  Condition:     Stable       Follow Up:    SD home      Follow up with PCP      Follow up with Dr. Jenkins or primary cardiologist as scheduled      Cardiac diet      Resume normal activities in 3 days    Patient Instructions:   Medications:      Discharge Medication List as of 7/12/2019 11:21 AM      CONTINUE these medications which have NOT CHANGED    Details   ASPIRIN (ASPIR-81 ORAL) Take 1 tablet by mouth., Starting 12/27/2011, Until Discontinued, Historical Med      cilostazol (PLETAL) 100 MG Tab TAKE 1 TABLET(100 MG) BY MOUTH TWICE DAILY, Normal      clopidogrel (PLAVIX) 75 mg tablet Take 1 tablet (75 mg total) by mouth once daily., Starting Mon 1/21/2019, Normal      coenzyme Q10 (COQ-10) 100 mg capsule Take 1 capsule (100 mg total) by mouth once daily., Starting Fri 11/21/2014, Until Fri 7/12/2019, Normal      !! metoprolol succinate (TOPROL-XL) 25 MG 24 hr tablet TAKE 1 TABLET(25 MG) BY MOUTH EVERY DAY, Normal      !! metoprolol succinate (TOPROL-XL) 25 MG 24 hr tablet TAKE 1 TABLET BY MOUTH EVERY DAY, Normal      atorvastatin (LIPITOR) 40 MG tablet TAKE 1 TABLET(40 MG) BY MOUTH EVERY DAY, Normal      hydroCHLOROthiazide (HYDRODIURIL) 25 MG tablet TAKE 1 TABLET(25 MG) BY MOUTH EVERY DAY, Normal      losartan (COZAAR) 100 MG tablet TAKE 1 TABLET(100 MG) BY MOUTH EVERY DAY, Normal      albuterol (PROVENTIL/VENTOLIN HFA) 90 mcg/actuation inhaler INHALE 2 PUFFS INTO THE LUNGS EVERY 6 HOURS AS NEEDED FOR WHEEZING, Normal      flunisolide 25 mcg, 0.025%, (NASALIDE) 25 mcg (0.025 %) Spry 2 sprays by Nasal route 2 (two) times daily., Starting Fri 3/22/2019, Normal       !! - Potential duplicate medications found. Please discuss with provider.             Time spent on the discharge of patient: 45 minutes    Keo Jenkins MD  Cardiology  [facility]   Show Applicator Variable?: Yes Render Note In Bullet Format When Appropriate: No Spray Paint Text: The liquid nitrogen was applied to the skin utilizing a spray paint frosting technique. Detail Level: Detailed Consent: The patient's consent was obtained including but not limited to risks of crusting, scabbing, blistering, scarring, darker or lighter pigmentary change, recurrence, incomplete removal and infection. Medical Necessity Clause: This procedure was medically necessary because the lesions that were treated were: Post-Care Instructions: I reviewed with the patient in detail post-care instructions. Patient is to wear sunprotection, and avoid picking at any of the treated lesions. Pt may apply Vaseline to crusted or scabbing areas. Medical Necessity Information: It is in your best interest to select a reason for this procedure from the list below. All of these items fulfill various CMS LCD requirements except the new and changing color options.

## 2024-01-16 ENCOUNTER — LAB VISIT (OUTPATIENT)
Dept: LAB | Facility: HOSPITAL | Age: 64
End: 2024-01-16
Attending: INTERNAL MEDICINE
Payer: COMMERCIAL

## 2024-01-16 DIAGNOSIS — D63.1 ANEMIA DUE TO STAGE 4 CHRONIC KIDNEY DISEASE: ICD-10-CM

## 2024-01-16 DIAGNOSIS — N18.4 ANEMIA DUE TO STAGE 4 CHRONIC KIDNEY DISEASE: ICD-10-CM

## 2024-01-16 DIAGNOSIS — N18.4 CKD (CHRONIC KIDNEY DISEASE) STAGE 4, GFR 15-29 ML/MIN: ICD-10-CM

## 2024-01-16 LAB
BASOPHILS # BLD AUTO: 0.05 K/UL (ref 0–0.2)
BASOPHILS NFR BLD: 0.9 % (ref 0–1.9)
DIFFERENTIAL METHOD BLD: ABNORMAL
EOSINOPHIL # BLD AUTO: 0.2 K/UL (ref 0–0.5)
EOSINOPHIL NFR BLD: 3.1 % (ref 0–8)
ERYTHROCYTE [DISTWIDTH] IN BLOOD BY AUTOMATED COUNT: 12.3 % (ref 11.5–14.5)
HCT VFR BLD AUTO: 29.7 % (ref 40–54)
HGB BLD-MCNC: 9.8 G/DL (ref 14–18)
IMM GRANULOCYTES # BLD AUTO: 0 K/UL (ref 0–0.04)
IMM GRANULOCYTES NFR BLD AUTO: 0 % (ref 0–0.5)
LYMPHOCYTES # BLD AUTO: 1.3 K/UL (ref 1–4.8)
LYMPHOCYTES NFR BLD: 23.5 % (ref 18–48)
MCH RBC QN AUTO: 29 PG (ref 27–31)
MCHC RBC AUTO-ENTMCNC: 33 G/DL (ref 32–36)
MCV RBC AUTO: 88 FL (ref 82–98)
MONOCYTES # BLD AUTO: 0.6 K/UL (ref 0.3–1)
MONOCYTES NFR BLD: 10.7 % (ref 4–15)
NEUTROPHILS # BLD AUTO: 3.3 K/UL (ref 1.8–7.7)
NEUTROPHILS NFR BLD: 61.8 % (ref 38–73)
NRBC BLD-RTO: 0 /100 WBC
PLATELET # BLD AUTO: 140 K/UL (ref 150–450)
PMV BLD AUTO: 10.8 FL (ref 9.2–12.9)
RBC # BLD AUTO: 3.38 M/UL (ref 4.6–6.2)
WBC # BLD AUTO: 5.41 K/UL (ref 3.9–12.7)

## 2024-01-16 PROCEDURE — 85025 COMPLETE CBC W/AUTO DIFF WBC: CPT | Performed by: INTERNAL MEDICINE

## 2024-01-16 PROCEDURE — 36415 COLL VENOUS BLD VENIPUNCTURE: CPT | Mod: PO | Performed by: INTERNAL MEDICINE

## 2024-01-21 ENCOUNTER — PATIENT MESSAGE (OUTPATIENT)
Dept: CARDIOLOGY | Facility: CLINIC | Age: 64
End: 2024-01-21
Payer: COMMERCIAL

## 2024-01-22 DIAGNOSIS — E78.2 MIXED HYPERLIPIDEMIA: ICD-10-CM

## 2024-01-22 RX ORDER — ROSUVASTATIN CALCIUM 40 MG/1
40 TABLET, COATED ORAL NIGHTLY
Qty: 90 TABLET | Refills: 3 | Status: SHIPPED | OUTPATIENT
Start: 2024-01-22 | End: 2024-03-01 | Stop reason: SDUPTHER

## 2024-01-27 ENCOUNTER — HOSPITAL ENCOUNTER (INPATIENT)
Facility: HOSPITAL | Age: 64
LOS: 13 days | Discharge: HOME | DRG: 193 | End: 2024-02-09
Attending: EMERGENCY MEDICINE | Admitting: FAMILY MEDICINE
Payer: COMMERCIAL

## 2024-01-27 DIAGNOSIS — E87.6 HYPOKALEMIA: ICD-10-CM

## 2024-01-27 DIAGNOSIS — N17.9 AKI (ACUTE KIDNEY INJURY): Primary | ICD-10-CM

## 2024-01-27 DIAGNOSIS — E83.42 HYPOMAGNESEMIA: ICD-10-CM

## 2024-01-27 DIAGNOSIS — B34.9 VIRAL SYNDROME: ICD-10-CM

## 2024-01-27 DIAGNOSIS — A41.9 SEPSIS WITHOUT ACUTE ORGAN DYSFUNCTION, DUE TO UNSPECIFIED ORGANISM: ICD-10-CM

## 2024-01-27 DIAGNOSIS — I10 PRIMARY HYPERTENSION: ICD-10-CM

## 2024-01-27 DIAGNOSIS — J18.9 MULTIFOCAL PNEUMONIA: ICD-10-CM

## 2024-01-27 DIAGNOSIS — R50.9 FEVER, UNSPECIFIED FEVER CAUSE: ICD-10-CM

## 2024-01-27 DIAGNOSIS — J44.1 COPD WITH ACUTE EXACERBATION: ICD-10-CM

## 2024-01-27 DIAGNOSIS — N19 UREMIA: ICD-10-CM

## 2024-01-27 DIAGNOSIS — I48.19 PERSISTENT ATRIAL FIBRILLATION: ICD-10-CM

## 2024-01-27 DIAGNOSIS — E78.2 MIXED HYPERLIPIDEMIA: ICD-10-CM

## 2024-01-27 DIAGNOSIS — E03.9 HYPOTHYROIDISM, UNSPECIFIED TYPE: ICD-10-CM

## 2024-01-27 PROBLEM — R74.01 TRANSAMINITIS: Status: ACTIVE | Noted: 2024-01-27

## 2024-01-27 LAB
ALBUMIN SERPL BCP-MCNC: 2.7 G/DL (ref 3.5–5.2)
ALP SERPL-CCNC: 51 U/L (ref 55–135)
ALT SERPL W/O P-5'-P-CCNC: 302 U/L (ref 10–44)
ANION GAP SERPL CALC-SCNC: 12 MMOL/L (ref 8–16)
AST SERPL-CCNC: 481 U/L (ref 10–40)
BACTERIA #/AREA URNS HPF: ABNORMAL /HPF
BILIRUB SERPL-MCNC: 0.4 MG/DL (ref 0.1–1)
BILIRUB UR QL STRIP: NEGATIVE
BUN SERPL-MCNC: 21 MG/DL (ref 8–23)
CALCIUM SERPL-MCNC: 8.6 MG/DL (ref 8.7–10.5)
CHLORIDE SERPL-SCNC: 100 MMOL/L (ref 95–110)
CLARITY UR: CLEAR
CO2 SERPL-SCNC: 24 MMOL/L (ref 23–29)
COLOR UR: YELLOW
CREAT SERPL-MCNC: 3.2 MG/DL (ref 0.5–1.4)
CREAT UR-MCNC: 62.3 MG/DL (ref 23–375)
ERYTHROCYTE [DISTWIDTH] IN BLOOD BY AUTOMATED COUNT: 12.1 % (ref 11.5–14.5)
EST. GFR  (NO RACE VARIABLE): 21 ML/MIN/1.73 M^2
GLUCOSE SERPL-MCNC: 103 MG/DL (ref 70–110)
GLUCOSE UR QL STRIP: ABNORMAL
HAV IGM SERPL QL IA: NORMAL
HBV CORE IGM SERPL QL IA: NORMAL
HBV SURFACE AG SERPL QL IA: NORMAL
HCT VFR BLD AUTO: 30.5 % (ref 40–54)
HCV AB SERPL QL IA: NORMAL
HGB BLD-MCNC: 10.6 G/DL (ref 14–18)
HGB UR QL STRIP: ABNORMAL
HYALINE CASTS #/AREA URNS LPF: 1 /LPF
INFLUENZA A, MOLECULAR: NEGATIVE
INFLUENZA B, MOLECULAR: NEGATIVE
KETONES UR QL STRIP: NEGATIVE
LACTATE SERPL-SCNC: 1.1 MMOL/L (ref 0.5–2.2)
LACTATE SERPL-SCNC: 1.2 MMOL/L (ref 0.5–2.2)
LEUKOCYTE ESTERASE UR QL STRIP: NEGATIVE
MAGNESIUM SERPL-MCNC: 1.3 MG/DL (ref 1.6–2.6)
MCH RBC QN AUTO: 29.2 PG (ref 27–31)
MCHC RBC AUTO-ENTMCNC: 34.8 G/DL (ref 32–36)
MCV RBC AUTO: 84 FL (ref 82–98)
MICROSCOPIC COMMENT: ABNORMAL
NITRITE UR QL STRIP: NEGATIVE
PH UR STRIP: 7 [PH] (ref 5–8)
PLATELET # BLD AUTO: 118 K/UL (ref 150–450)
PMV BLD AUTO: 10.6 FL (ref 9.2–12.9)
POTASSIUM SERPL-SCNC: 2.4 MMOL/L (ref 3.5–5.1)
POTASSIUM SERPL-SCNC: 2.9 MMOL/L (ref 3.5–5.1)
PROCALCITONIN SERPL IA-MCNC: 0.28 NG/ML
PROT SERPL-MCNC: 6.4 G/DL (ref 6–8.4)
PROT UR QL STRIP: ABNORMAL
RBC # BLD AUTO: 3.63 M/UL (ref 4.6–6.2)
RBC #/AREA URNS HPF: 27 /HPF (ref 0–4)
SARS-COV-2 RDRP RESP QL NAA+PROBE: NEGATIVE
SODIUM SERPL-SCNC: 136 MMOL/L (ref 136–145)
SODIUM UR-SCNC: 103 MMOL/L (ref 20–250)
SP GR UR STRIP: 1.01 (ref 1–1.03)
SPECIMEN SOURCE: NORMAL
SQUAMOUS #/AREA URNS HPF: 0 /HPF
T4 FREE SERPL-MCNC: 0.92 NG/DL (ref 0.71–1.51)
TSH SERPL DL<=0.005 MIU/L-ACNC: 4.22 UIU/ML (ref 0.4–4)
URN SPEC COLLECT METH UR: ABNORMAL
UROBILINOGEN UR STRIP-ACNC: NEGATIVE EU/DL
UUN UR-MCNC: 269 MG/DL (ref 140–1050)
WBC # BLD AUTO: 6.15 K/UL (ref 3.9–12.7)
WBC #/AREA URNS HPF: 2 /HPF (ref 0–5)

## 2024-01-27 PROCEDURE — 84145 PROCALCITONIN (PCT): CPT | Performed by: EMERGENCY MEDICINE

## 2024-01-27 PROCEDURE — 93005 ELECTROCARDIOGRAM TRACING: CPT

## 2024-01-27 PROCEDURE — 63600175 PHARM REV CODE 636 W HCPCS: Performed by: EMERGENCY MEDICINE

## 2024-01-27 PROCEDURE — 93010 ELECTROCARDIOGRAM REPORT: CPT | Mod: ,,, | Performed by: INTERNAL MEDICINE

## 2024-01-27 PROCEDURE — 87502 INFLUENZA DNA AMP PROBE: CPT | Performed by: EMERGENCY MEDICINE

## 2024-01-27 PROCEDURE — U0002 COVID-19 LAB TEST NON-CDC: HCPCS | Performed by: EMERGENCY MEDICINE

## 2024-01-27 PROCEDURE — 84540 ASSAY OF URINE/UREA-N: CPT | Performed by: EMERGENCY MEDICINE

## 2024-01-27 PROCEDURE — 25000003 PHARM REV CODE 250

## 2024-01-27 PROCEDURE — 36415 COLL VENOUS BLD VENIPUNCTURE: CPT

## 2024-01-27 PROCEDURE — 25000003 PHARM REV CODE 250: Performed by: EMERGENCY MEDICINE

## 2024-01-27 PROCEDURE — 80053 COMPREHEN METABOLIC PANEL: CPT | Performed by: EMERGENCY MEDICINE

## 2024-01-27 PROCEDURE — 84132 ASSAY OF SERUM POTASSIUM: CPT

## 2024-01-27 PROCEDURE — 84439 ASSAY OF FREE THYROXINE: CPT

## 2024-01-27 PROCEDURE — 85027 COMPLETE CBC AUTOMATED: CPT | Performed by: EMERGENCY MEDICINE

## 2024-01-27 PROCEDURE — 80074 ACUTE HEPATITIS PANEL: CPT

## 2024-01-27 PROCEDURE — 82570 ASSAY OF URINE CREATININE: CPT | Performed by: EMERGENCY MEDICINE

## 2024-01-27 PROCEDURE — 63600175 PHARM REV CODE 636 W HCPCS

## 2024-01-27 PROCEDURE — 83735 ASSAY OF MAGNESIUM: CPT | Performed by: EMERGENCY MEDICINE

## 2024-01-27 PROCEDURE — 87040 BLOOD CULTURE FOR BACTERIA: CPT | Mod: 59 | Performed by: EMERGENCY MEDICINE

## 2024-01-27 PROCEDURE — 83605 ASSAY OF LACTIC ACID: CPT | Mod: 91 | Performed by: EMERGENCY MEDICINE

## 2024-01-27 PROCEDURE — 36415 COLL VENOUS BLD VENIPUNCTURE: CPT | Mod: XB | Performed by: EMERGENCY MEDICINE

## 2024-01-27 PROCEDURE — 84300 ASSAY OF URINE SODIUM: CPT | Performed by: EMERGENCY MEDICINE

## 2024-01-27 PROCEDURE — 81000 URINALYSIS NONAUTO W/SCOPE: CPT | Performed by: EMERGENCY MEDICINE

## 2024-01-27 PROCEDURE — 84443 ASSAY THYROID STIM HORMONE: CPT

## 2024-01-27 PROCEDURE — 99285 EMERGENCY DEPT VISIT HI MDM: CPT | Mod: 25

## 2024-01-27 PROCEDURE — 11000001 HC ACUTE MED/SURG PRIVATE ROOM

## 2024-01-27 RX ORDER — ACETAMINOPHEN 325 MG/1
650 TABLET ORAL
Status: COMPLETED | OUTPATIENT
Start: 2024-01-27 | End: 2024-01-27

## 2024-01-27 RX ORDER — NITROGLYCERIN 0.4 MG/1
0.4 TABLET SUBLINGUAL EVERY 5 MIN PRN
COMMUNITY
End: 2024-03-01 | Stop reason: SDUPTHER

## 2024-01-27 RX ORDER — ATORVASTATIN CALCIUM 40 MG/1
80 TABLET, FILM COATED ORAL DAILY
Status: DISCONTINUED | OUTPATIENT
Start: 2024-01-27 | End: 2024-01-27

## 2024-01-27 RX ORDER — DOXYCYCLINE HYCLATE 100 MG
100 TABLET ORAL DAILY
Status: DISCONTINUED | OUTPATIENT
Start: 2024-01-28 | End: 2024-01-28

## 2024-01-27 RX ORDER — TALC
9 POWDER (GRAM) TOPICAL NIGHTLY PRN
Status: DISCONTINUED | OUTPATIENT
Start: 2024-01-27 | End: 2024-02-09 | Stop reason: HOSPADM

## 2024-01-27 RX ORDER — ENOXAPARIN SODIUM 100 MG/ML
30 INJECTION SUBCUTANEOUS EVERY 24 HOURS
Status: DISCONTINUED | OUTPATIENT
Start: 2024-01-27 | End: 2024-01-27 | Stop reason: SDUPTHER

## 2024-01-27 RX ORDER — AMOXICILLIN 250 MG
1 CAPSULE ORAL 2 TIMES DAILY PRN
Status: DISCONTINUED | OUTPATIENT
Start: 2024-01-27 | End: 2024-02-09 | Stop reason: HOSPADM

## 2024-01-27 RX ORDER — CLOPIDOGREL BISULFATE 75 MG/1
75 TABLET ORAL DAILY
Status: DISCONTINUED | OUTPATIENT
Start: 2024-01-27 | End: 2024-01-31

## 2024-01-27 RX ORDER — NITROGLYCERIN 0.4 MG/1
0.4 TABLET SUBLINGUAL EVERY 5 MIN PRN
Status: DISCONTINUED | OUTPATIENT
Start: 2024-01-27 | End: 2024-02-09 | Stop reason: HOSPADM

## 2024-01-27 RX ORDER — CARVEDILOL 25 MG/1
25 TABLET ORAL 2 TIMES DAILY WITH MEALS
Status: DISCONTINUED | OUTPATIENT
Start: 2024-01-27 | End: 2024-02-05

## 2024-01-27 RX ORDER — ACETAMINOPHEN 325 MG/1
650 TABLET ORAL EVERY 8 HOURS PRN
Status: DISCONTINUED | OUTPATIENT
Start: 2024-01-27 | End: 2024-01-27

## 2024-01-27 RX ORDER — POTASSIUM CHLORIDE 20 MEQ/1
40 TABLET, EXTENDED RELEASE ORAL EVERY 4 HOURS
Status: COMPLETED | OUTPATIENT
Start: 2024-01-27 | End: 2024-01-27

## 2024-01-27 RX ORDER — DOXYCYCLINE HYCLATE 100 MG
100 TABLET ORAL
Status: COMPLETED | OUTPATIENT
Start: 2024-01-27 | End: 2024-01-27

## 2024-01-27 RX ORDER — MAGNESIUM SULFATE 1 G/100ML
1 INJECTION INTRAVENOUS
Status: COMPLETED | OUTPATIENT
Start: 2024-01-27 | End: 2024-01-27

## 2024-01-27 RX ORDER — SODIUM CHLORIDE 0.9 % (FLUSH) 0.9 %
5 SYRINGE (ML) INJECTION
Status: DISCONTINUED | OUTPATIENT
Start: 2024-01-27 | End: 2024-02-09 | Stop reason: HOSPADM

## 2024-01-27 RX ORDER — ISOSORBIDE MONONITRATE 30 MG/1
60 TABLET, EXTENDED RELEASE ORAL DAILY
Status: DISCONTINUED | OUTPATIENT
Start: 2024-01-27 | End: 2024-02-05

## 2024-01-27 RX ORDER — LEVOTHYROXINE SODIUM 50 UG/1
50 TABLET ORAL
Status: DISCONTINUED | OUTPATIENT
Start: 2024-01-28 | End: 2024-01-28

## 2024-01-27 RX ORDER — AMIODARONE HYDROCHLORIDE 200 MG/1
200 TABLET ORAL 2 TIMES DAILY
Status: DISCONTINUED | OUTPATIENT
Start: 2024-01-27 | End: 2024-01-28

## 2024-01-27 RX ORDER — SPIRONOLACTONE 25 MG/1
25 TABLET ORAL DAILY
Status: DISCONTINUED | OUTPATIENT
Start: 2024-01-27 | End: 2024-01-27

## 2024-01-27 RX ORDER — LIDOCAINE 50 MG/G
1 PATCH TOPICAL DAILY PRN
Status: DISCONTINUED | OUTPATIENT
Start: 2024-01-27 | End: 2024-02-09 | Stop reason: HOSPADM

## 2024-01-27 RX ORDER — POTASSIUM CHLORIDE 20 MEQ/1
40 TABLET, EXTENDED RELEASE ORAL
Status: COMPLETED | OUTPATIENT
Start: 2024-01-27 | End: 2024-01-27

## 2024-01-27 RX ORDER — POTASSIUM CHLORIDE 7.45 MG/ML
10 INJECTION INTRAVENOUS
Status: COMPLETED | OUTPATIENT
Start: 2024-01-27 | End: 2024-01-27

## 2024-01-27 RX ADMIN — ISOSORBIDE MONONITRATE 60 MG: 30 TABLET, EXTENDED RELEASE ORAL at 03:01

## 2024-01-27 RX ADMIN — POTASSIUM CHLORIDE 10 MEQ: 7.46 INJECTION, SOLUTION INTRAVENOUS at 12:01

## 2024-01-27 RX ADMIN — CEFTRIAXONE SODIUM 2 G: 2 INJECTION, POWDER, FOR SOLUTION INTRAMUSCULAR; INTRAVENOUS at 12:01

## 2024-01-27 RX ADMIN — MAGNESIUM SULFATE 1 G: 1 INJECTION INTRAVENOUS at 03:01

## 2024-01-27 RX ADMIN — CLOPIDOGREL BISULFATE 75 MG: 75 TABLET ORAL at 03:01

## 2024-01-27 RX ADMIN — CARVEDILOL 25 MG: 25 TABLET, FILM COATED ORAL at 05:01

## 2024-01-27 RX ADMIN — APIXABAN 5 MG: 5 TABLET, FILM COATED ORAL at 08:01

## 2024-01-27 RX ADMIN — MAGNESIUM SULFATE 1 G: 1 INJECTION INTRAVENOUS at 08:01

## 2024-01-27 RX ADMIN — POTASSIUM CHLORIDE 40 MEQ: 1500 TABLET, EXTENDED RELEASE ORAL at 11:01

## 2024-01-27 RX ADMIN — AMIODARONE HYDROCHLORIDE 200 MG: 200 TABLET ORAL at 08:01

## 2024-01-27 RX ADMIN — DOXYCYCLINE HYCLATE 100 MG: 100 TABLET, COATED ORAL at 12:01

## 2024-01-27 RX ADMIN — POTASSIUM CHLORIDE 40 MEQ: 1500 TABLET, EXTENDED RELEASE ORAL at 05:01

## 2024-01-27 RX ADMIN — POTASSIUM CHLORIDE 40 MEQ: 1500 TABLET, EXTENDED RELEASE ORAL at 10:01

## 2024-01-27 RX ADMIN — MAGNESIUM SULFATE 1 G: 1 INJECTION INTRAVENOUS at 10:01

## 2024-01-27 RX ADMIN — ACETAMINOPHEN 650 MG: 325 TABLET ORAL at 11:01

## 2024-01-27 RX ADMIN — MAGNESIUM SULFATE 1 G: 1 INJECTION INTRAVENOUS at 05:01

## 2024-01-27 NOTE — ASSESSMENT & PLAN NOTE
Patient with known history of HLD reports being compliant with the following home regimen: Rosuvastatin 40mg QD    Plan:  Will hold statin in setting of elevated transaminitis  Rosuvastatin not available in hospital; will use Atorvastatin equivalent of 80 mg QD when appropriate

## 2024-01-27 NOTE — Clinical Note
Diagnosis: BRETT (acute kidney injury) [179243]   Future Attending Provider: LIBRADO POTTER [[id number]]   Reason for IP Medical Treatment  (Clinical interventions that can only be accomplished in the IP setting? ) :: IV ABX, K replacement   I certify that Inpatient services for greater than or equal to 2 midnights are medically necessary:: Yes   Plans for Post-Acute care--if anticipated (pick the single best option):: A. No post acute care anticipated at this time

## 2024-01-27 NOTE — ASSESSMENT & PLAN NOTE
Patient with known history of HTN reports being compliant with the following home regimen: Carvedilol 25mg BID, Eplerenone 50mg QD, Imdur 60mg QD. Patient's pressures on admission to the ER are (143-198)/(64-88) - elevated likely secondary to not taking medications before arriving to the hospital.    Plan:  - Continue home Carvedilol and Imdur  - Eplerenone not available in hospital; in setting of his hypokalemia, will use Spironolactone if additional antihypertensives are required  - If pressures reach above 180 systolic or 100 diastolic, will use PRN medications

## 2024-01-27 NOTE — ASSESSMENT & PLAN NOTE
Patient presents with K 2.4. Reportedly takes potassium supplements at home. S/P 40mEq of potassium PO, 10mEq of KCl in ER.    Plan:  - Repeat stat potassium  - Give PO KCl 40mEq X2  - Replete as needed

## 2024-01-27 NOTE — ASSESSMENT & PLAN NOTE
Patient presents with AST/ALT of 481/302, (24/19 5 months ago). Denies drinking alcohol.    Plan:  Will hold PRN Tylenol  Abdominal ultrasound pending  Hepatitis panel pending

## 2024-01-27 NOTE — PROGRESS NOTES
"[facility]           Pharmacy  Admission Medication Reconciliation     Based on information gathered for medication list, you may go to "Admission" then "Reconcile Home Medications" tabs to review and/or act upon those items.     The home medication list has been updated by the Pharmacy department.   Please read ALL comments highlighted in red.   Please address this information as you see fit.    Feel free to contact us if you have any questions or require assistance.    Home medication list has been compared to current inpatient medications. Please review the following discrepancies noted below:    Patient reports STILL TAKING the following medication(s) which was not ordered upon admit  Ranolazine 500mg twice daily  Rosuvastatin 40mg every evening    Feel free to contact us if you have any questions or require assistance.    Janeen Gtz, PharmD  [phone]    "

## 2024-01-27 NOTE — ASSESSMENT & PLAN NOTE
Patient with baseline CKD (Cr 2.3-2.8) presents with BUN/Cr of 21/3.2, Estimated Creatinine Clearance: 24.7 mL/min (A) (based on SCr of 3.2 mg/dL (H)).    Plan:  - Urine studies pending  - Strict I&O  - Avoid nephrotoxins and renally dose meds

## 2024-01-27 NOTE — PHARMACY MED REC
"[facility]           Pharmacy  Admission Medication History     The home medication history was taken by Cherie Asencio.      Medication history obtained from Medications listed below were obtained from: Patient/family    Based on information gathered for medication list, you may go to "Admission" then "Reconcile Home Medications" tabs to review and/or act upon those items.     The home medication list has been updated by the Pharmacy department.   Please read ALL comments highlighted in yellow.   Please address this information as you see fit.    Feel free to contact us if you have any questions or require assistance.        Current Facility-Administered Medications on File Prior to Encounter   Medication Dose Route Frequency Provider Last Rate Last Admin    sodium chloride 0.9% infusion   Intravenous Continuous PRN Bushra Cervantes, CRNA   New Bag at 11/14/22 1033     Current Outpatient Medications on File Prior to Encounter   Medication Sig Dispense Refill    albuterol (PROVENTIL/VENTOLIN HFA) 90 mcg/actuation inhaler INHALE 2 PUFFS INTO THE LUNGS EVERY 6 HOURS AS NEEDED FOR WHEEZING 54 g 3    albuterol-ipratropium (DUO-NEB) 2.5 mg-0.5 mg/3 mL nebulizer solution Take 3 mLs by nebulization every 6 (six) hours as needed for Wheezing or Shortness of Breath (or cough). Rescue 75 mL 3    amiodarone (PACERONE) 200 MG Tab Take 1 tablet (200 mg total) by mouth 2 (two) times daily. 180 tablet 3    apixaban (ELIQUIS) 5 mg Tab Take 1 tablet (5 mg total) by mouth 2 (two) times daily. 60 tablet 11    carvediloL (COREG) 25 MG tablet Take 1 tablet (25 mg total) by mouth 2 (two) times daily with meals. 60 tablet 11    clopidogreL (PLAVIX) 75 mg tablet Take 1 tablet (75 mg total) by mouth once daily. 90 tablet 3    coenzyme Q10 100 mg capsule Take 100 mg by mouth once daily.      eplerenone (INSPRA) 50 MG Tab Take 1 tablet (50 mg total) by mouth once daily. 90 tablet 3    isosorbide mononitrate (IMDUR) 60 MG " 24 hr tablet Take 1 tablet (60 mg total) by mouth once daily. 90 tablet 3    levothyroxine (SYNTHROID) 50 MCG tablet Take one tablet PO every morning on an empty stomach and wait at least 1 hour before eating or taking other medications (Patient taking differently: Take 50 mcg by mouth before breakfast.  wait at least 1 hour before eating or taking other medications) 30 tablet 11    ranolazine (RANEXA) 500 MG Tb12 Take 1 tablet (500 mg total) by mouth 2 (two) times daily. 180 tablet 3    rosuvastatin (CRESTOR) 40 MG Tab Take 1 tablet (40 mg total) by mouth every evening. 90 tablet 3    predniSONE (DELTASONE) 20 MG tablet Take 3 pills daily for 3 days, then 2 pills daily for 3 days, then 1 pill daily for 3 days, then 1/2 pill daily for 4 days. Start 10/5/23 (Patient not taking: Reported on 1/27/2024) 20 tablet 0    sodium bicarbonate 650 MG tablet Take 1 tablet (650 mg total) by mouth once daily. for 30 doses (Patient not taking: Reported on 1/27/2024) 30 tablet 3       Please address this information as you see fit.  Feel free to contact us if you have any questions or require assistance.    Cherie Asencio  [phone]                  .

## 2024-01-27 NOTE — PROGRESS NOTES
01/27/24 1700   Admission   Initial VN Admission Questions Complete   Shift   Virtual Nurse - Rounding Complete   Virtual Nurse - Patient Declined Camera Use   Type of Frequent Check   Type Patient Rounds   Safety/Activity   Patient Rounds bed in low position;visualized patient   Safety Promotion/Fall Prevention side rails raised x 2;instructed to call staff for mobility   Positioning   Head of Bed (HOB) Positioning HOB at 30-45 degrees   Positioning/Transfer Devices pillows;in use   Pain/Comfort/Sleep   Comfort/Acceptable Pain Level 0   Pain Rating (0-10): Rest 0     VN cued into patient's room and permission given to adjust the camera towards patient.  Patient is resting in bed with no complaints of pain.  Did cough several times.  Admission questions completed.  Plan of care reviewed.  Safety maintained.  Instructed patient to call for any assistance and patient verbalized understanding.  Will continue to monitor.

## 2024-01-27 NOTE — ASSESSMENT & PLAN NOTE
Patient with documented diagnosis of Atrial Fibrillation per notes by Cardiologist Dmitriy Chavarria MD reports being compliant with the following home regimen: Apixaban 5mg BID, Amiodarone. EKG taken during admission reveals Aflutter/A Fib    Plan:  .[id number] Score: 1. . Anticoagulation indicated. Anticoagulation done with Apixaban . Will hold Amiodarone in setting of transaminitis.

## 2024-01-27 NOTE — H&P
"[facility] Medicine  History & Physical    Patient Name: Domingo Gross  MRN: [id number]  Patient Class: IP- Inpatient  Admission Date: 1/27/2024  Attending Physician: Coleen Packer MD   Primary Care Provider: Analy Encarnacion MD         Patient information was obtained from patient, spouse/SO, and ER records.     Subjective:     Principal Problem:Hypokalemia    Chief Complaint:   Chief Complaint   Patient presents with    URI     Brought in by  EMS from home. Cough and sneezing since yesterday with subjective fever. + flu contacts at work. CBG with         HPI: 63-year-old man with PMHx of HTN, Atrial Fibrillation (sees Dr. Calderón, on Amiodarone, Apixaban), COPD (inhaler PRN), CKD (sees Dr. Payne), Hypothyroidism (Levothyroxine) presents via ambulance to [facility] on 1/27/24 for 1-day history of chest pain, cough, irregular breathing. History was collected from patient and wife, who is at bedside.    Patient reports experiencing chest pain, cough, and decreased appetite that started last night. Reports a fever of 102, did not take Tylenol. This morning, wife noticed that he was breathing "funny" and called the ambulance. Patient's boss recently tested positive for Flu/COVID. Patient emphasized that lack of food intake was due to decreased appetite and denied emesis and nausea.    Currently patient denies any chest pain or difficulty breathing. He reports taking potassium at home & being compliant with all medications. He is a former smoker and denies drinking alcohol.    In the ER, vitals were as follows - Temp 99.8 -> 102.3, HR 68, /87, O2 94%. Labs were significant for K 2.4, Magnesium 1.3, BUN/Cr 21/3.2 (baseline 2.8), AST//302, Alk phos 51 WBC 6.15, Procal 0.28. Patient was given 40mEq of potassium PO, 10mEq of KCl and admitted to [facility] for management/further workup of of Hypokalemia, BRETT, PNA, Transaminitis.    Interval History: No CP, on " RA    Review of Systems   Constitutional:  Positive for appetite change and fever.   HENT:  Negative for trouble swallowing.    Eyes:  Negative for visual disturbance.   Respiratory:  Positive for cough. Negative for shortness of breath.    Cardiovascular:  Negative for chest pain, palpitations and leg swelling.   Gastrointestinal:  Negative for abdominal pain, constipation, diarrhea, nausea and vomiting.   Genitourinary:  Negative for dysuria and hematuria.   Neurological:  Negative for weakness and headaches.   Psychiatric/Behavioral:  Negative for confusion.      Objective:     Vital Signs (Most Recent):  Temp: (!) 102.3 °F (39.1 °C) (01/27/24 1254)  Pulse: 64 (01/27/24 1309)  Resp: (!) 24 (01/27/24 1309)  BP: (!) 143/64 (01/27/24 1309)  SpO2: 96 % (01/27/24 1309) Vital Signs (24h Range):  Temp:  [99.8 °F (37.7 °C)-103.3 °F (39.6 °C)] 102.3 °F (39.1 °C)  Pulse:  [62-73] 64  Resp:  [20-24] 24  SpO2:  [94 %-96 %] 96 %  BP: (143-198)/(64-88) 143/64     Weight: 81.6 kg (180 lb)  Body mass index is 25.83 kg/m².    Intake/Output Summary (Last 24 hours) at 1/27/2024 1503  Last data filed at 1/27/2024 1252  Gross per 24 hour   Intake 100 ml   Output --   Net 100 ml         Physical Exam  Constitutional:       General: He is not in acute distress.     Appearance: He is ill-appearing. He is not toxic-appearing.   Eyes:      General: No scleral icterus.        Right eye: No discharge.         Left eye: No discharge.      Comments: Bright red spot remnant of subconjunctival hemorrhage in R eye   Cardiovascular:      Pulses: Normal pulses.   Pulmonary:      Effort: Pulmonary effort is normal. No respiratory distress.      Breath sounds: Normal breath sounds. No wheezing.   Abdominal:      General: Bowel sounds are normal.      Palpations: Abdomen is soft.      Tenderness: There is no abdominal tenderness. There is no guarding or rebound.   Musculoskeletal:         General: Normal range of motion.      Right lower leg: No  "edema.      Left lower leg: No edema.   Skin:     General: Skin is warm.      Coloration: Skin is not jaundiced.   Neurological:      Mental Status: He is alert and oriented to person, place, and time.      Motor: No weakness.   Psychiatric:         Mood and Affect: Mood normal.         Behavior: Behavior normal.         Thought Content: Thought content normal.             Significant Labs: All pertinent labs within the past 24 hours have been reviewed.  CBC:   Recent Labs   Lab 01/27/24  1046   WBC 6.15   HGB 10.6*   HCT 30.5*   *     CMP:   Recent Labs   Lab 01/27/24  1046      K 2.4*      CO2 24      BUN 21   CREATININE 3.2*   CALCIUM 8.6*   PROT 6.4   ALBUMIN 2.7*   BILITOT 0.4   ALKPHOS 51*   *   *   ANIONGAP 12       Significant Imaging: I have reviewed all pertinent imaging results/findings within the past 24 hours.  Assessment/Plan:     * Hypokalemia  Patient presents with K 2.4. Reportedly takes potassium supplements at home. S/P 40mEq of potassium PO, 10mEq of KCl in ER.    Plan:  - Repeat stat potassium  - Give PO KCl 40mEq X2  - Replete as needed    CAP (community acquired pneumonia)  Patient presented with "chest hurts", cough, decreased appetite. Temp 103 On RA, sating 94-96%. WBC 6.15, Lactic acid 1.2, Procal 0.28.  CXR: Increased lung opacities, concerning for interstitial edema and/or pneumonitis.  Short-term imaging follow-up could be performed to ensure resolution.    Plan:  Blood cultures pending  Continue Ceftriaxone + Doxycycline; broaden if necessary    BRETT (acute kidney injury)  Patient with baseline CKD (Cr 2.3-2.8) presents with BUN/Cr of 21/3.2, Estimated Creatinine Clearance: 24.7 mL/min (A) (based on SCr of 3.2 mg/dL (H)).    Plan:  - Urine studies pending  - Strict I&O  - Avoid nephrotoxins and renally dose meds    Hypomagnesemia  Patient presents with Magnesium 1.3    Plan:  IV Mag sulfate 1g X 4 doses    Transaminitis  Patient presents with " AST/ALT of 481/302, (24/19 5 months ago). Denies drinking alcohol.    Plan:  Will hold PRN Tylenol  Abdominal ultrasound pending  Hepatitis panel pending    Persistent atrial fibrillation  Patient with documented diagnosis of Atrial Fibrillation per notes by Cardiologist Dmitriy Chavarria MD reports being compliant with the following home regimen: Apixaban 5mg BID, Amiodarone. EKG taken during admission reveals Aflutter/A Fib    Plan:  .[id number] Score: 1. . Anticoagulation indicated. Anticoagulation done with Apixaban . Will hold Amiodarone in setting of transaminitis.    HTN (hypertension)  Patient with known history of HTN reports being compliant with the following home regimen: Carvedilol 25mg BID, Eplerenone 50mg QD, Imdur 60mg QD. Patient's pressures on admission to the ER are (143-198)/(64-88) - elevated likely secondary to not taking medications before arriving to the hospital.    Plan:  - Continue home Carvedilol and Imdur  - Eplerenone not available in hospital; in setting of his hypokalemia, will use Spironolactone if additional antihypertensives are required  - If pressures reach above 180 systolic or 100 diastolic, will use PRN medications    Hyperlipidemia  Patient with known history of HLD reports being compliant with the following home regimen: Rosuvastatin 40mg QD    Plan:  Will hold statin in setting of elevated transaminitis  Rosuvastatin not available in hospital; will use Atorvastatin equivalent of 80 mg QD when appropriate    Hypothyroidism  Patient with reported TSH 5 months ago 13.669    Plan:  Repeat TSH pending  Continue home regimen      VTE Risk Mitigation (From admission, onward)           Ordered     apixaban tablet 5 mg  2 times daily         01/27/24 1435     IP VTE HIGH RISK PATIENT  Once         01/27/24 1240     Place sequential compression device  Until discontinued         01/27/24 1240                            Valeria Izaguirre MD  Department of Hospital Medicine  [facility]  Telemetry

## 2024-01-27 NOTE — PLAN OF CARE
Problem: Adult Inpatient Plan of Care  Goal: Plan of Care Review  Outcome: Ongoing, Progressing     Vital signs, labs, progress notes and care plan reviewed.  Will continue to monitor.

## 2024-01-27 NOTE — ED PROVIDER NOTES
Emergency Department Encounter  Provider Note    Domingo Gross  [id number]  1/27/2024    Evaluation:    History Acquisition:     Chief Complaint   Patient presents with    URI     Brought in by [facility] from home. Cough and sneezing since yesterday with subjective fever. + flu contacts at work. CBG with        History of Present Illness:  Domingo Gross is a 63 y.o. male who has a past medical history of Allergy, Anemia, Anticoagulant long-term use, Arthritis, CKD (chronic kidney disease) stage 4, GFR 15-29 ml/min, COPD (chronic obstructive pulmonary disease) (08/20/2021), Coronary artery disease, Heart attack (10/04/2019), Hematuria, Hemothorax, Hyperlipidemia, Hypertension, Hyperuricemia, Hypocalcemia, Hypokalemia, Hypophosphatemia, Hypothyroidism (3/2/2023), PAD (peripheral artery disease), Proteinuria, and Vitamin D deficiency.    The patient presents to the ED due to cough, congestion, and fever.   Patient reports symptoms started last night.  He states his boss had the flu recently.  He has history of COPD, and uses inhalers as needed at home.  He does not use home O2.  He reports feeling fatigued and weak as well.  Denies any CP, nausea/vomiting, abdominal pain, or urinary complaints.    Additional historians utilized:  EMS report:  Vitals stable en route.  Glucose 121.     Prior medical records were reviewed:   Nephrology visit 12/2023 for follow-up CKD  Cardiology visit 12/2023 for follow-up A-Fib  IM visit 10/2023 for follow-up COPD    The patient's list of active medical problems, social history, medications, and allergies as documented has been reviewed.     Past Medical History:   Diagnosis Date    Allergy     Anemia     Anticoagulant long-term use     Arthritis     CKD (chronic kidney disease) stage 4, GFR 15-29 ml/min     COPD (chronic obstructive pulmonary disease) 08/20/2021    Coronary artery disease     Heart attack 10/04/2019    Hematuria     Hemothorax     Hyperlipidemia      Hypertension     Hyperuricemia     Hypocalcemia     Hypokalemia     Hypophosphatemia     Hypothyroidism 3/2/2023    PAD (peripheral artery disease)     Proteinuria     Vitamin D deficiency      Past Surgical History:   Procedure Laterality Date    ABDOMINAL AORTOGRAPHY N/A 5/10/2019    Procedure: AORTOGRAM-ABDOMINAL;  Surgeon: Keo Jenkins MD;  Location: [facility] CATH LAB/EP;  Service: Cardiology;  Laterality: N/A;  RSFA intervention     AORTOGRAPHY WITH SERIALOGRAPHY N/A 12/3/2021    Procedure: AORTOGRAM, WITH SERIALOGRAPHY;  Surgeon: Keo Jenkins MD;  Location: [facility] CATH LAB/EP;  Service: Cardiology;  Laterality: N/A;    AORTOGRAPHY WITH SERIALOGRAPHY N/A 4/1/2022    Procedure: AORTOGRAM, WITH SERIALOGRAPHY;  Surgeon: Keo Jenkins MD;  Location: [facility] CATH LAB/EP;  Service: Cardiology;  Laterality: N/A;    ATHERECTOMY, CORONARY N/A 4/25/2023    Procedure: Atherectomy-coronary;  Surgeon: Keo Jenkins MD;  Location: [facility] CATH LAB/EP;  Service: Cardiology;  Laterality: N/A;    BONE MARROW BIOPSY Right 10/12/2021    Procedure: BIOPSY-BONE MARROW;  Surgeon: Naseem Woods MD;  Location: [facility] OR;  Service: Oncology;  Laterality: Right;    CARDIAC CATHETERIZATION      CATARACT EXTRACTION      CATARACT EXTRACTION W/  INTRAOCULAR LENS IMPLANT Right 6/8/2020    Procedure: EXTRACTION, CATARACT, WITH IOL INSERTION;  Surgeon: Tin Light MD;  Location: [facility] OR;  Service: Ophthalmology;  Laterality: Right;    CATARACT EXTRACTION W/  INTRAOCULAR LENS IMPLANT Left 7/2/2020    Procedure: EXTRACTION, CATARACT, WITH IOL INSERTION;  Surgeon: Tin Light MD;  Location: [facility] OR;  Service: Ophthalmology;  Laterality: Left;    COLONOSCOPY N/A 1/6/2022    Procedure: COLONOSCOPY;  Surgeon: Kathe Penaloza MD;  Location: [facility] ENDO ([address] [place] [address]);  Service: Endoscopy;  Laterality: N/A;  COVID test at [facility] on 1/3-GT  okay to hold Plavix for 5 days and aspirin per Dr. Becerra  2nd floor due toextensive  cardiac history   instructions mailed and my [facility] portal -     CORONARY ANGIOGRAPHY N/A 3/29/2019    Procedure: ANGIOGRAM, CORONARY ARTERY;  Surgeon: Koe Jenkins MD;  Location: [facility] CATH LAB/EP;  Service: Cardiology;  Laterality: N/A;    CORONARY ANGIOGRAPHY Left 10/11/2019    Procedure: ANGIOGRAM, CORONARY ARTERY;  Surgeon: Keo Jenkins MD;  Location: [facility] CATH LAB/EP;  Service: Cardiology;  Laterality: Left;    CORONARY ANGIOGRAPHY N/A 4/10/2023    Procedure: ANGIOGRAM, CORONARY ARTERY;  Surgeon: Keo Jenkins MD;  Location: [facility] CATH LAB/EP;  Service: Cardiology;  Laterality: N/A;    CORONARY ANGIOPLASTY WITH STENT PLACEMENT  03/29/2019    mid and distal RCA    ESOPHAGOGASTRODUODENOSCOPY N/A 1/6/2022    Procedure: EGD (ESOPHAGOGASTRODUODENOSCOPY);  Surgeon: Kathe Penaloza MD;  Location: [address]);  Service: Endoscopy;  Laterality: N/A;    EYE SURGERY      INSTANTANEOUS WAVE-FREE RATIO (IFR) N/A 4/25/2023    Procedure: Instantaneous Wave-Free Ratio (IFR);  Surgeon: Keo Jenkins MD;  Location: [facility] CATH LAB/EP;  Service: Cardiology;  Laterality: N/A;    INTRAVASCULAR ULTRASOUND, NON-CORONARY  8/12/2022    Procedure: Intravascular Ultrasound, Non-Coronary;  Surgeon: Keo Jenkins MD;  Location: [facility] CATH LAB/EP;  Service: Cardiology;;    IVUS, CORONARY  4/25/2023    Procedure: IVUS, Coronary;  Surgeon: Keo Jenkins MD;  Location: [facility] CATH LAB/EP;  Service: Cardiology;;    IVUS, CORONARY  5/16/2023    Procedure: IVUS, Coronary;  Surgeon: Keo Jenkins MD;  Location: [facility] CATH LAB/EP;  Service: Cardiology;;  CX    LEFT HEART CATHETERIZATION N/A 3/29/2019    Procedure: Left heart cath;  Surgeon: Keo Jenkins MD;  Location: [facility] CATH LAB/EP;  Service: Cardiology;  Laterality: N/A;    LEFT HEART CATHETERIZATION Left 10/8/2019    Procedure: Left heart cath;  Surgeon: Keo Jenkins MD;  Location: [facility] CATH LAB/EP;  Service: Cardiology;  Laterality: Left;    LEFT HEART  CATHETERIZATION Left 4/10/2023    Procedure: Left heart cath;  Surgeon: Keo Jenkins MD;  Location: [facility] CATH LAB/EP;  Service: Cardiology;  Laterality: Left;    LEFT HEART CATHETERIZATION Left 4/25/2023    Procedure: Left heart cath;  Surgeon: Keo Jenkins MD;  Location: [facility] CATH LAB/EP;  Service: Cardiology;  Laterality: Left;    LEFT HEART CATHETERIZATION Left 5/16/2023    Procedure: Left heart cath;  Surgeon: Keo Jenkins MD;  Location: [facility] CATH LAB/EP;  Service: Cardiology;  Laterality: Left;    PERCUTANEOUS TRANSLUMINAL ANGIOPLASTY (PTA) OF PERIPHERAL VESSEL N/A 7/12/2019    Procedure: PTA, PERIPHERAL VESSEL;  Surgeon: Keo Jenkins MD;  Location: [facility] CATH LAB/EP;  Service: Cardiology;  Laterality: N/A;    PERCUTANEOUS TRANSLUMINAL ANGIOPLASTY (PTA) OF PERIPHERAL VESSEL Left 5/20/2022    Procedure: PTA, PERIPHERAL VESSEL;  Surgeon: Keo Jenkins MD;  Location: [facility] CATH LAB/EP;  Service: Cardiology;  Laterality: Left;    PERCUTANEOUS TRANSLUMINAL ANGIOPLASTY (PTA) OF PERIPHERAL VESSEL Right 8/12/2022    Procedure: PTA, PERIPHERAL VESSEL;  Surgeon: Keo Jenkins MD;  Location: [facility] CATH LAB/EP;  Service: Cardiology;  Laterality: Right;    PERCUTANEOUS TRANSLUMINAL BALLOON ANGIOPLASTY OF CORONARY ARTERY  4/25/2023    Procedure: Angioplasty-coronary;  Surgeon: Keo Jenkins MD;  Location: [facility] CATH LAB/EP;  Service: Cardiology;;    PTCA, SINGLE VESSEL  5/16/2023    Procedure: PTCA, Single Vessel;  Surgeon: Keo Jenkins MD;  Location: [facility] CATH LAB/EP;  Service: Cardiology;;  CX    STENT, DRUG ELUTING, SINGLE VESSEL, CORONARY  4/25/2023    Procedure: Stent, Drug Eluting, Single Vessel, Coronary;  Surgeon: Keo Jenkins MD;  Location: [facility] CATH LAB/EP;  Service: Cardiology;;    STENT, DRUG ELUTING, SINGLE VESSEL, CORONARY  5/16/2023    Procedure: Stent, Drug Eluting, Single Vessel, Coronary;  Surgeon: Keo Jenkins MD;  Location: [facility] CATH LAB/EP;  Service: Cardiology;;  CX     TRANSESOPHAGEAL ECHOCARDIOGRAM WITH POSSIBLE CARDIOVERSION (JOSE W/ POSS CARDIOVERSION) N/A 2023    Procedure: Transesophageal echo (JOSE) intra-procedure log documentation;  Surgeon: Keo Jenkins MD;  Location: [facility] CATH LAB/EP;  Service: Cardiology;  Laterality: N/A;     Family History   Problem Relation Age of Onset    Aneurysm Mother     Hypertension Mother     Heart disease Mother     Cancer Father     Diabetes Sister     Hypertension Sister     No Known Problems Brother     No Known Problems Son      Social History     Socioeconomic History    Marital status:    Occupational History     Employer: [employer]   Tobacco Use    Smoking status: Former     Current packs/day: 0.00     Types: Cigarettes     Quit date: 1999     Years since quittin.7    Smokeless tobacco: Never   Substance and Sexual Activity    Alcohol use: No     Alcohol/week: 0.0 standard drinks of alcohol    Drug use: No    Sexual activity: Yes     Partners: Female     Social Determinants of Health     Financial Resource Strain: Medium Risk (2023)    Overall Financial Resource Strain (CARDIA)     Difficulty of Paying Living Expenses: Somewhat hard   Food Insecurity: Food Insecurity Present (2023)    Hunger Vital Sign     Worried About Running Out of Food in the Last Year: Sometimes true     Ran Out of Food in the Last Year: Often true   Transportation Needs: No Transportation Needs (2023)    PRAPARE - Transportation     Lack of Transportation (Medical): No     Lack of Transportation (Non-Medical): No   Physical Activity: Insufficiently Active (2023)    Exercise Vital Sign     Days of Exercise per Week: 2 days     Minutes of Exercise per Session: 10 min   Stress: No Stress Concern Present (2023)    [place] [employer] - Occupational Stress Questionnaire     Feeling of Stress : Only a little   Social Connections: Unknown (2023)    Social Connection  and Isolation Panel [NHANES]     Frequency of Communication with Friends and Family: More than three times a week     Frequency of Social Gatherings with Friends and Family: Never     Active Member of Clubs or Organizations: No     Attends Club or Organization Meetings: Never     Marital Status:    Recent Concern: Social Connections - Moderately Isolated (9/8/2023)    Social Connection and Isolation Panel [NHANES]     Frequency of Communication with Friends and Family: Three times a week     Frequency of Social Gatherings with Friends and Family: Never     Attends Nondenominational Services: Never     Active Member of Clubs or Organizations: No     Attends Club or Organization Meetings: Patient declined     Marital Status:    Housing Stability: Low Risk  (11/28/2023)    Housing Stability Vital Sign     Unable to Pay for Housing in the Last Year: No     Number of Places Lived in the Last Year: 0     Unstable Housing in the Last Year: No   Recent Concern: Housing Stability - High Risk (9/8/2023)    Housing Stability Vital Sign     Unable to Pay for Housing in the Last Year: Yes     Number of Places Lived in the Last Year: 0     Unstable Housing in the Last Year: No     Review of patient's allergies indicates:   Allergen Reactions    Ace inhibitors Rash       Review of Systems   Constitutional:  Positive for fever.   HENT:  Positive for congestion.    Respiratory:  Positive for cough and shortness of breath.          Physical Exam:     Initial Vitals [01/27/24 1030]   BP Pulse Resp Temp SpO2   (!) 198/87 68 20 99.8 °F (37.7 °C) (!) 94 %      MAP       --         Physical Exam    Nursing note and vitals reviewed.  Constitutional: He appears well-developed and well-nourished. He is not diaphoretic. No distress.   HENT:   Head: Normocephalic and atraumatic.   Mouth/Throat: Oropharynx is clear and moist.   Eyes: EOM are normal. Pupils are equal, round, and reactive to light.   Neck: No tracheal  deviation present.   Cardiovascular:  Normal rate, regular rhythm, normal heart sounds and intact distal pulses.           Pulmonary/Chest: Effort normal. No stridor. No tachypnea. No respiratory distress. He has decreased breath sounds. He has no wheezes.   Abdominal: Abdomen is soft. He exhibits no distension and no mass. There is no abdominal tenderness.   Musculoskeletal:         General: No edema. Normal range of motion.     Neurological: He is alert and oriented to person, place, and time. No cranial nerve deficit or sensory deficit.   Skin: Skin is warm and dry. Capillary refill takes less than 2 seconds. No rash noted.   Psychiatric: He has a normal mood and affect. His behavior is normal. Thought content normal.       Differential Diagnoses:   Based on available information and initial assessment, Differential Diagnosis includes, but is not limited to:  Sepsis, bacteremia, UTI, pneumonia, cellulitis, abscess, indwelling line/catheter infection, cholecystitis, viral URI, gastroenteritis, viral syndrome, sinusitis, otitis media/externa, neoplasm, drug reaction, serotonin syndrome, intoxication/withdrawal syndrome.      ED Management:   Procedures    Orders Placed This Encounter    Influenza A & B by Molecular    Blood culture x two cultures. Draw prior to antibiotics.    X-Ray Chest AP Portable    US Abdomen Limited    CBC Without Differential    Comprehensive metabolic panel    COVID-19 Rapid Screening    Magnesium    Lactic acid, plasma #1    Lactic acid, plasma #2    Urinalysis, Reflex to Urine Culture Urine, Clean Catch    Procalcitonin    Urinalysis Microscopic    Comprehensive Metabolic Panel (CMP)    Magnesium    Phosphorus    CBC with Automated Differential    Sodium, urine, random    Urea nitrogen, urine    Creatinine, urine, random    Urea Nitrogen, Random Urine    Sodium, Random Urine    Creatinine, Random Urine    Potassium    Hepatitis panel, acute    TSH    T4, Free     Basic metabolic panel    Acetaminophen level    Protime-INR    Phosphatidylethanol (PETH)    Herpes simplex Virus (HSV) Type 1 & 2 DNA by PCR    Gennaro-Barr Virus antibody panel    CMV DNA, quantitative, PCR    Cytomegalovirus antibody, IgG    Cytomegalovirus (Cmv) Ab, Igm    TOOTIE    Anti-smooth muscle antibody    Anti-Liver, Kidney, Microsome Ab    HIV 1/2 Ag/Ab (4th Gen)    Gennaro-Barr virus DNA, quantitative    Gennaro-Barr virus DNA, quantitative    Diet Low Sodium, 2gm    ED Preference List Used to Initiate Sepsis Orders    Cardiac Monitoring - Adult    Strict intake and output    Vital Signs    Weigh patient    Strict intake and output (1) Document % meals taken (2) # of urinations (3) # and consistency of BMs    Bladder scan    Notify Physician    Place sequential compression device    Full code    EKG 12-lead    Saline lock IV    Insert saline lock    Possible Hospitalization    Admit to Inpatient    potassium chloride 10 mEq in 100 mL IVPB    potassium chloride SA CR tablet 40 mEq    acetaminophen tablet 650 mg    cefTRIAXone (ROCEPHIN) 2 g in dextrose 5 % in water (D5W) 100 mL IVPB (MB+)    doxycycline tablet 100 mg    sodium chloride 0.9% flush 5 mL    melatonin tablet 9 mg    senna-docusate 8.6-50 mg per tablet 1 tablet    LIDOcaine 5 % patch 1 patch    carvediloL tablet 25 mg    clopidogreL tablet 75 mg    levothyroxine tablet 50 mcg    isosorbide mononitrate 24 hr tablet 60 mg    nitroGLYCERIN SL tablet 0.4 mg    cefTRIAXone (ROCEPHIN) 2 g in dextrose 5 % in water (D5W) 100 mL IVPB (MB+)    potassium chloride SA CR tablet 40 mEq    magnesium sulfate in dextrose IVPB (premix) 1 g    potassium chloride SA CR tablet 40 mEq    azithromycin tablet 500 mg    IP VTE HIGH RISK PATIENT    Bed rest    Progressive Mobility Protocol (mobilize patient to their highest level of functioning at least twice daily)    Dangle at bedside          EKG:   EKG  interpretation by ED attending physician:  NSR, rate 71, LBBB, non-specific ST changes, no ST elevations or acute ischemia, prolonged QT.  Compared with prior EKG dated 12/2023, LBBB is new, QT stable.    Repeat EKG interpretation by ED attending physician:  NSR, rate 71, LBBB, non-specific ST changes, no ST elevations or acute ischemia, prolonged QT.  Compared with prior EKG dated earlier today, grossly stable.       Labs:     Labs Reviewed   CBC WITHOUT DIFFERENTIAL - Abnormal; Notable for the following components:       Result Value    RBC 3.63 (*)     Hemoglobin 10.6 (*)     Hematocrit 30.5 (*)     Platelets 118 (*)     All other components within normal limits   COMPREHENSIVE METABOLIC PANEL - Abnormal; Notable for the following components:    Potassium 2.4 (*)     Creatinine 3.2 (*)     Calcium 8.6 (*)     Albumin 2.7 (*)     Alkaline Phosphatase 51 (*)      (*)      (*)     eGFR 21 (*)     All other components within normal limits    Narrative:     K critical result(s) called and verbal readback obtained from   Geronimo Giron RN. by [facility] 01/27/2024 11:16   MAGNESIUM - Abnormal; Notable for the following components:    Magnesium 1.3 (*)     All other components within normal limits   URINALYSIS, REFLEX TO URINE CULTURE - Abnormal; Notable for the following components:    Protein, UA 3+ (*)     Glucose, UA Trace (*)     Occult Blood UA 3+ (*)     All other components within normal limits    Narrative:     Specimen Source->Urine   PROCALCITONIN - Abnormal; Notable for the following components:    Procalcitonin 0.28 (*)     All other components within normal limits   URINALYSIS MICROSCOPIC - Abnormal; Notable for the following components:    RBC, UA 27 (*)     All other components within normal limits    Narrative:     Specimen Source->Urine   INFLUENZA A & B BY MOLECULAR   SARS-COV-2 RNA AMPLIFICATION, QUAL   LACTIC ACID, PLASMA   SODIUM, URINE, RANDOM   UREA NITROGEN, URINE, RANDOM   CREATININE,  URINE, RANDOM   UREA NITROGEN, URINE, RANDOM    Narrative:     Specimen Source->Urine   SODIUM, URINE, RANDOM    Narrative:     Specimen Source->Urine   CREATININE, URINE, RANDOM    Narrative:     Specimen Source->Urine     Independent review of the labs ordered include:   See ED course    Imaging:     Imaging Results              US Abdomen Limited (Final result)  Result time 01/28/24 09:54:14      Final result by Garo Pringle MD (01/28/24 09:54:14)                   Impression:      No significant sonographic abnormality.      Electronically signed by: Garo Pringle MD  Date:    01/28/2024  Time:    09:54               Narrative:    EXAMINATION:  US ABDOMEN LIMITED    CLINICAL HISTORY:  fever, transaminitis;    TECHNIQUE:  Limited ultrasound of the right upper quadrant of the abdomen (including pancreas, liver, gallbladder, common bile duct, and spleen) was performed.    COMPARISON:  None.    FINDINGS:  Liver: Normal in size, measuring 14.7 cm. Homogeneous echotexture. No focal hepatic lesions.    Pancreas: Visualized portion is unremarkable.    Gallbladder: No calculi, wall thickening, or pericholecystic fluid.  No sonographic Valdivia's sign.    Biliary system: The common duct is not dilated, measuring 2 mm.  No intrahepatic ductal dilatation.    Spleen: Normal in size and echotexture, measuring 9.8 cm.    Miscellaneous: No upper abdominal ascites.                                       X-Ray Chest AP Portable (Final result)  Result time 01/27/24 11:45:25      Final result by Andrews Paz MD (01/27/24 11:45:25)                   Impression:      Increased lung opacities, concerning for interstitial edema and/or pneumonitis.  Short-term imaging follow-up could be performed to ensure resolution.      Electronically signed by: Andrews Paz MD  Date:    01/27/2024  Time:    11:45               Narrative:    EXAMINATION:  XR CHEST AP PORTABLE    CLINICAL HISTORY:  cough, SOB, COPD;    TECHNIQUE:  Single frontal  view of the chest was performed.    COMPARISON:  11/13/2022    FINDINGS:  Mildly enlarged cardiac silhouette.There are increased bibasilar and perihilar interstitial markings.  Underlying chronic interstitial lung changes noted.  No pneumothorax.No pleural effusions.                                         Medications Given:     Medications   sodium chloride 0.9% flush 5 mL (has no administration in time range)   melatonin tablet 9 mg (has no administration in time range)   senna-docusate 8.6-50 mg per tablet 1 tablet (has no administration in time range)   LIDOcaine 5 % patch 1 patch (has no administration in time range)   carvediloL tablet 25 mg (25 mg Oral Given 1/28/24 0805)   clopidogreL tablet 75 mg (75 mg Oral Given 1/28/24 0805)   levothyroxine tablet 50 mcg (50 mcg Oral Given 1/28/24 0610)   isosorbide mononitrate 24 hr tablet 60 mg (60 mg Oral Given 1/28/24 0805)   nitroGLYCERIN SL tablet 0.4 mg (has no administration in time range)   cefTRIAXone (ROCEPHIN) 2 g in dextrose 5 % in water (D5W) 100 mL IVPB (MB+) (0 g Intravenous Stopped 1/28/24 0116)   potassium chloride SA CR tablet 40 mEq (40 mEq Oral Given 1/28/24 0907)   azithromycin tablet 500 mg (500 mg Oral Given 1/28/24 0907)   potassium chloride 10 mEq in 100 mL IVPB (10 mEq Intravenous New Bag 1/27/24 1252)   potassium chloride SA CR tablet 40 mEq (40 mEq Oral Given 1/27/24 1153)   acetaminophen tablet 650 mg (650 mg Oral Given 1/27/24 1152)   cefTRIAXone (ROCEPHIN) 2 g in dextrose 5 % in water (D5W) 100 mL IVPB (MB+) (0 g Intravenous Stopped 1/27/24 1252)   doxycycline tablet 100 mg (100 mg Oral Given 1/27/24 1233)   potassium chloride SA CR tablet 40 mEq (40 mEq Oral Given 1/27/24 2218)   magnesium sulfate in dextrose IVPB (premix) 1 g (0 g Intravenous Stopped 1/27/24 2322)        Medical Decision Making:    Additional Consideration:   Additional testing considered during clinical course: none    Social determinants of health considered during  development of treatment plan include: none    Current co-morbidities considered which impacted clinical decision making: HTN, COPD, CKD, PAD, CAD, MI with cardiac arrest s/p PCI, chronic anemia, a-fib    Case discussed with additional provider: [facility] contacted for admission and further management of PNA, hypokalemia    ED Course as of 01/28/24 1142   Sat Jan 27, 2024   1116 SpO2(!): 94 % [SS]   1116 Resp: 20 [SS]   1116 Pulse: 68 [SS]   1116 Temp Source: Oral [SS]   1116 Temp: 99.8 °F (37.7 °C) [SS]   1116 BP(!): 198/87 [SS]   1154 Comprehensive metabolic panel(!!)  K low, Cr higher than baseline, LFTs significantly elevated [SS]   1154 CBC Without Differential(!)  Unremarkable  [SS]   1154 Influenza A & B by Molecular  Negative  [SS]   1154 COVID-19 Rapid Screening  Negative  [SS]   1154 Temp(!): 103.3 °F (39.6 °C)  Repeat temp now 103 F. Sepsis order set utilized, and ABX given. Lactate, blood cultures ordered.   CXR with diffuse opacities, coupled with respiratory symptoms, concern for PNA as source of infection.  [SS]   1155 X-Ray Chest AP Portable  CXR independently interpreted: diffuse hazy infiltrates, no large effusion, edema, free air, or other acute process.  Agree with radiologist interpretation.    [SS]      ED Course User Index  [SS] Aneesh Mendieta MD            Medical Decision Making  64 yo M with HTN, COPD, CKD, PAD, CAD, MI with cardiac arrest s/p PCI, chronic anemia, a-fib presents to ED fever, cough, congestion, recent influenza contacts recently.  Initial vitals unremarkable, however, patient developed 103 Fahrenheit fever while in the ED.  Sepsis evaluation initiated.    Labs concerning for hypokalemia, BRETT on CKD, and hypomagnesemia.  CXR with hazy interstitial infiltrates bilaterally, concerning for developing pneumonia in setting of COPD.  IV antibiotics given.  No hypotension, lactate normal.  Will admit for further evaluation and management.    Problems Addressed:  BRETT (acute kidney  injury): complicated acute illness or injury  COPD with acute exacerbation: complicated acute illness or injury with systemic symptoms  Fever, unspecified fever cause: complicated acute illness or injury with systemic symptoms that poses a threat to life or bodily functions  Hypokalemia: complicated acute illness or injury with systemic symptoms that poses a threat to life or bodily functions  Hypomagnesemia: complicated acute illness or injury with systemic symptoms that poses a threat to life or bodily functions  Multifocal pneumonia: complicated acute illness or injury with systemic symptoms that poses a threat to life or bodily functions  Sepsis without acute organ dysfunction, due to unspecified organism: complicated acute illness or injury with systemic symptoms that poses a threat to life or bodily functions  Viral syndrome: acute illness or injury    Amount and/or Complexity of Data Reviewed  Independent Historian: EMS  External Data Reviewed: notes.  Labs: ordered. Decision-making details documented in ED Course.  Radiology: ordered and independent interpretation performed. Decision-making details documented in ED Course.  ECG/medicine tests: ordered and independent interpretation performed. Decision-making details documented in ED Course.    Risk  OTC drugs.  Prescription drug management.  Decision regarding hospitalization.    Critical Care  Total time providing critical care: 60 minutes      Clinical Impression:       ICD-10-CM ICD-9-CM   1. Hypokalemia  E87.6 276.8   2. BRETT (acute kidney injury)  N17.9 584.9   3. Viral syndrome  B34.9 079.99   4. COPD with acute exacerbation  J44.1 491.21   5. Fever, unspecified fever cause  R50.9 780.60   6. Multifocal pneumonia  J18.9 486   7. Sepsis without acute organ dysfunction, due to unspecified organism  A41.9 038.9     995.91   8. Hypomagnesemia  E83.42 275.2         Follow-up Information    None          ED Disposition Condition    Admit Stable               On re-evaluation, the patient's status has improved.  At this time, I believe the patient should be admitted to the hospital for further evaluation and management.  The consulting physician/team agrees with plan and will admit under their service.   The patient and family were updated with test results, overall impression, and further plan of care. All questions were answered.       Aneesh Mendieta MD  01/28/24 6506

## 2024-01-27 NOTE — ASSESSMENT & PLAN NOTE
"Patient presented with "chest hurts", cough, decreased appetite. Temp 103 On RA, sating 94-96%. WBC 6.15, Lactic acid 1.2, Procal 0.28.  CXR: Increased lung opacities, concerning for interstitial edema and/or pneumonitis.  Short-term imaging follow-up could be performed to ensure resolution.    Plan:  Blood cultures pending  Continue Ceftriaxone + Doxycycline; broaden if necessary  "

## 2024-01-27 NOTE — HPI
"63-year-old man with PMHx of HTN, Atrial Fibrillation (sees Dr. Calderón, on Amiodarone, Apixaban), COPD (inhaler PRN), CKD (sees Dr. Payne), Hypothyroidism (Levothyroxine) presents via ambulance to [facility] on 1/27/24 for 1-day history of chest pain, cough, irregular breathing. History was collected from patient and wife, who is at bedside.    Patient reports experiencing chest pain, cough, and decreased appetite that started last night. Reports a fever of 102, did not take Tylenol. This morning, wife noticed that he was breathing "funny" and called the ambulance. Patient's boss recently tested positive for Flu/COVID. Patient emphasized that lack of food intake was due to decreased appetite and denied emesis and nausea.    Currently patient denies any chest pain or difficulty breathing. He reports taking potassium at home & being compliant with all medications. He is a former smoker and denies drinking alcohol.    In the ER, vitals were as follows - Temp 99.8 -> 102.3, HR 68, /87, O2 94%. Labs were significant for K 2.4, Magnesium 1.3, BUN/Cr 21/3.2 (baseline 2.8), AST//302, Alk phos 51 WBC 6.15, Procal 0.28. Patient was given 40mEq of potassium PO, 10mEq of KCl and admitted to [facility] for management/further workup of of Hypokalemia, BRETT, PNA, Transaminitis.  "

## 2024-01-28 PROBLEM — K72.00 ACUTE LIVER FAILURE WITHOUT HEPATIC COMA: Status: ACTIVE | Noted: 2024-01-27

## 2024-01-28 LAB
ALBUMIN SERPL BCP-MCNC: 2.2 G/DL (ref 3.5–5.2)
ALP SERPL-CCNC: 51 U/L (ref 55–135)
ALT SERPL W/O P-5'-P-CCNC: 1434 U/L (ref 10–44)
ANION GAP SERPL CALC-SCNC: 11 MMOL/L (ref 8–16)
ANION GAP SERPL CALC-SCNC: 12 MMOL/L (ref 8–16)
ANION GAP SERPL CALC-SCNC: 8 MMOL/L (ref 8–16)
APAP SERPL-MCNC: <3 UG/ML (ref 10–20)
AST SERPL-CCNC: 3134 U/L (ref 10–40)
BASOPHILS # BLD AUTO: 0.02 K/UL (ref 0–0.2)
BASOPHILS NFR BLD: 0.4 % (ref 0–1.9)
BILIRUB SERPL-MCNC: 0.7 MG/DL (ref 0.1–1)
BUN SERPL-MCNC: 25 MG/DL (ref 8–23)
BUN SERPL-MCNC: 29 MG/DL (ref 8–23)
BUN SERPL-MCNC: 32 MG/DL (ref 8–23)
CALCIUM SERPL-MCNC: 7.9 MG/DL (ref 8.7–10.5)
CALCIUM SERPL-MCNC: 7.9 MG/DL (ref 8.7–10.5)
CALCIUM SERPL-MCNC: 8 MG/DL (ref 8.7–10.5)
CHLORIDE SERPL-SCNC: 100 MMOL/L (ref 95–110)
CHLORIDE SERPL-SCNC: 103 MMOL/L (ref 95–110)
CHLORIDE SERPL-SCNC: 99 MMOL/L (ref 95–110)
CO2 SERPL-SCNC: 20 MMOL/L (ref 23–29)
CO2 SERPL-SCNC: 23 MMOL/L (ref 23–29)
CO2 SERPL-SCNC: 23 MMOL/L (ref 23–29)
CREAT SERPL-MCNC: 3.5 MG/DL (ref 0.5–1.4)
CREAT SERPL-MCNC: 3.8 MG/DL (ref 0.5–1.4)
CREAT SERPL-MCNC: 3.9 MG/DL (ref 0.5–1.4)
DIFFERENTIAL METHOD BLD: ABNORMAL
EOSINOPHIL # BLD AUTO: 0 K/UL (ref 0–0.5)
EOSINOPHIL NFR BLD: 0.2 % (ref 0–8)
ERYTHROCYTE [DISTWIDTH] IN BLOOD BY AUTOMATED COUNT: 11.9 % (ref 11.5–14.5)
EST. GFR  (NO RACE VARIABLE): 17 ML/MIN/1.73 M^2
EST. GFR  (NO RACE VARIABLE): 17 ML/MIN/1.73 M^2
EST. GFR  (NO RACE VARIABLE): 19 ML/MIN/1.73 M^2
GLUCOSE SERPL-MCNC: 102 MG/DL (ref 70–110)
GLUCOSE SERPL-MCNC: 116 MG/DL (ref 70–110)
GLUCOSE SERPL-MCNC: 90 MG/DL (ref 70–110)
HCT VFR BLD AUTO: 24.1 % (ref 40–54)
HGB BLD-MCNC: 8.8 G/DL (ref 14–18)
HIV 1+2 AB+HIV1 P24 AG SERPL QL IA: NORMAL
IMM GRANULOCYTES # BLD AUTO: 0.03 K/UL (ref 0–0.04)
IMM GRANULOCYTES NFR BLD AUTO: 0.6 % (ref 0–0.5)
INR PPP: 1.6 (ref 0.8–1.2)
LYMPHOCYTES # BLD AUTO: 0.5 K/UL (ref 1–4.8)
LYMPHOCYTES NFR BLD: 9 % (ref 18–48)
MAGNESIUM SERPL-MCNC: 2.6 MG/DL (ref 1.6–2.6)
MCH RBC QN AUTO: 29.8 PG (ref 27–31)
MCHC RBC AUTO-ENTMCNC: 36.5 G/DL (ref 32–36)
MCV RBC AUTO: 82 FL (ref 82–98)
MONOCYTES # BLD AUTO: 0.2 K/UL (ref 0.3–1)
MONOCYTES NFR BLD: 3.4 % (ref 4–15)
NEUTROPHILS # BLD AUTO: 4.5 K/UL (ref 1.8–7.7)
NEUTROPHILS NFR BLD: 86.4 % (ref 38–73)
NRBC BLD-RTO: 0 /100 WBC
PHOSPHATE SERPL-MCNC: 2.7 MG/DL (ref 2.7–4.5)
PLATELET # BLD AUTO: 76 K/UL (ref 150–450)
PLATELET BLD QL SMEAR: ABNORMAL
PMV BLD AUTO: 11.1 FL (ref 9.2–12.9)
POTASSIUM SERPL-SCNC: 2.4 MMOL/L (ref 3.5–5.1)
POTASSIUM SERPL-SCNC: 2.8 MMOL/L (ref 3.5–5.1)
POTASSIUM SERPL-SCNC: 3.5 MMOL/L (ref 3.5–5.1)
PROT SERPL-MCNC: 5.3 G/DL (ref 6–8.4)
PROTHROMBIN TIME: 17.2 SEC (ref 9–12.5)
RBC # BLD AUTO: 2.95 M/UL (ref 4.6–6.2)
SODIUM SERPL-SCNC: 131 MMOL/L (ref 136–145)
SODIUM SERPL-SCNC: 133 MMOL/L (ref 136–145)
SODIUM SERPL-SCNC: 135 MMOL/L (ref 136–145)
WBC # BLD AUTO: 5.24 K/UL (ref 3.9–12.7)

## 2024-01-28 PROCEDURE — 63600175 PHARM REV CODE 636 W HCPCS

## 2024-01-28 PROCEDURE — 86645 CMV ANTIBODY IGM: CPT | Performed by: STUDENT IN AN ORGANIZED HEALTH CARE EDUCATION/TRAINING PROGRAM

## 2024-01-28 PROCEDURE — 84100 ASSAY OF PHOSPHORUS: CPT

## 2024-01-28 PROCEDURE — 86376 MICROSOMAL ANTIBODY EACH: CPT | Performed by: STUDENT IN AN ORGANIZED HEALTH CARE EDUCATION/TRAINING PROGRAM

## 2024-01-28 PROCEDURE — 25000003 PHARM REV CODE 250

## 2024-01-28 PROCEDURE — 86039 ANTINUCLEAR ANTIBODIES (ANA): CPT

## 2024-01-28 PROCEDURE — 36415 COLL VENOUS BLD VENIPUNCTURE: CPT | Mod: XB | Performed by: STUDENT IN AN ORGANIZED HEALTH CARE EDUCATION/TRAINING PROGRAM

## 2024-01-28 PROCEDURE — 86644 CMV ANTIBODY: CPT | Performed by: STUDENT IN AN ORGANIZED HEALTH CARE EDUCATION/TRAINING PROGRAM

## 2024-01-28 PROCEDURE — 86665 EPSTEIN-BARR CAPSID VCA: CPT | Mod: 59 | Performed by: STUDENT IN AN ORGANIZED HEALTH CARE EDUCATION/TRAINING PROGRAM

## 2024-01-28 PROCEDURE — 80048 BASIC METABOLIC PNL TOTAL CA: CPT | Mod: 91,XB

## 2024-01-28 PROCEDURE — 86235 NUCLEAR ANTIGEN ANTIBODY: CPT | Mod: 59 | Performed by: STUDENT IN AN ORGANIZED HEALTH CARE EDUCATION/TRAINING PROGRAM

## 2024-01-28 PROCEDURE — 36415 COLL VENOUS BLD VENIPUNCTURE: CPT | Mod: XB

## 2024-01-28 PROCEDURE — 80321 ALCOHOLS BIOMARKERS 1OR 2: CPT | Performed by: STUDENT IN AN ORGANIZED HEALTH CARE EDUCATION/TRAINING PROGRAM

## 2024-01-28 PROCEDURE — 87389 HIV-1 AG W/HIV-1&-2 AB AG IA: CPT | Performed by: STUDENT IN AN ORGANIZED HEALTH CARE EDUCATION/TRAINING PROGRAM

## 2024-01-28 PROCEDURE — 86015 ACTIN ANTIBODY EACH: CPT | Performed by: STUDENT IN AN ORGANIZED HEALTH CARE EDUCATION/TRAINING PROGRAM

## 2024-01-28 PROCEDURE — 85025 COMPLETE CBC W/AUTO DIFF WBC: CPT

## 2024-01-28 PROCEDURE — 11000001 HC ACUTE MED/SURG PRIVATE ROOM

## 2024-01-28 PROCEDURE — 83735 ASSAY OF MAGNESIUM: CPT

## 2024-01-28 PROCEDURE — 36415 COLL VENOUS BLD VENIPUNCTURE: CPT

## 2024-01-28 PROCEDURE — 86038 ANTINUCLEAR ANTIBODIES: CPT | Performed by: STUDENT IN AN ORGANIZED HEALTH CARE EDUCATION/TRAINING PROGRAM

## 2024-01-28 PROCEDURE — 63700000 PHARM REV CODE 250 ALT 637 W/O HCPCS: Performed by: STUDENT IN AN ORGANIZED HEALTH CARE EDUCATION/TRAINING PROGRAM

## 2024-01-28 PROCEDURE — 85610 PROTHROMBIN TIME: CPT

## 2024-01-28 PROCEDURE — 80053 COMPREHEN METABOLIC PANEL: CPT

## 2024-01-28 PROCEDURE — 87529 HSV DNA AMP PROBE: CPT | Mod: 59 | Performed by: STUDENT IN AN ORGANIZED HEALTH CARE EDUCATION/TRAINING PROGRAM

## 2024-01-28 PROCEDURE — 80143 DRUG ASSAY ACETAMINOPHEN: CPT | Performed by: STUDENT IN AN ORGANIZED HEALTH CARE EDUCATION/TRAINING PROGRAM

## 2024-01-28 RX ORDER — POTASSIUM CHLORIDE 7.45 MG/ML
10 INJECTION INTRAVENOUS
Status: COMPLETED | OUTPATIENT
Start: 2024-01-28 | End: 2024-01-28

## 2024-01-28 RX ORDER — POTASSIUM CHLORIDE 20 MEQ/1
40 TABLET, EXTENDED RELEASE ORAL EVERY 4 HOURS
Status: COMPLETED | OUTPATIENT
Start: 2024-01-28 | End: 2024-01-28

## 2024-01-28 RX ORDER — LEVOTHYROXINE SODIUM 75 UG/1
75 TABLET ORAL
Status: DISCONTINUED | OUTPATIENT
Start: 2024-01-29 | End: 2024-02-09 | Stop reason: HOSPADM

## 2024-01-28 RX ORDER — AZITHROMYCIN 250 MG/1
500 TABLET, FILM COATED ORAL DAILY
Status: COMPLETED | OUTPATIENT
Start: 2024-01-28 | End: 2024-01-29

## 2024-01-28 RX ORDER — BENZONATATE 100 MG/1
100 CAPSULE ORAL 3 TIMES DAILY PRN
Status: DISCONTINUED | OUTPATIENT
Start: 2024-01-28 | End: 2024-02-09 | Stop reason: HOSPADM

## 2024-01-28 RX ADMIN — POTASSIUM CHLORIDE 10 MEQ: 7.46 INJECTION, SOLUTION INTRAVENOUS at 05:01

## 2024-01-28 RX ADMIN — POTASSIUM CHLORIDE 10 MEQ: 7.46 INJECTION, SOLUTION INTRAVENOUS at 06:01

## 2024-01-28 RX ADMIN — POTASSIUM CHLORIDE 10 MEQ: 7.46 INJECTION, SOLUTION INTRAVENOUS at 08:01

## 2024-01-28 RX ADMIN — AZITHROMYCIN 500 MG: 250 TABLET, FILM COATED ORAL at 09:01

## 2024-01-28 RX ADMIN — CARVEDILOL 25 MG: 25 TABLET, FILM COATED ORAL at 08:01

## 2024-01-28 RX ADMIN — POTASSIUM CHLORIDE 40 MEQ: 1500 TABLET, EXTENDED RELEASE ORAL at 09:01

## 2024-01-28 RX ADMIN — CLOPIDOGREL BISULFATE 75 MG: 75 TABLET ORAL at 08:01

## 2024-01-28 RX ADMIN — ISOSORBIDE MONONITRATE 60 MG: 30 TABLET, EXTENDED RELEASE ORAL at 08:01

## 2024-01-28 RX ADMIN — POTASSIUM CHLORIDE 40 MEQ: 1500 TABLET, EXTENDED RELEASE ORAL at 06:01

## 2024-01-28 RX ADMIN — POTASSIUM CHLORIDE 40 MEQ: 1500 TABLET, EXTENDED RELEASE ORAL at 01:01

## 2024-01-28 RX ADMIN — POTASSIUM CHLORIDE 40 MEQ: 1500 TABLET, EXTENDED RELEASE ORAL at 05:01

## 2024-01-28 RX ADMIN — CEFTRIAXONE SODIUM 2 G: 2 INJECTION, POWDER, FOR SOLUTION INTRAMUSCULAR; INTRAVENOUS at 11:01

## 2024-01-28 RX ADMIN — POTASSIUM CHLORIDE 40 MEQ: 1500 TABLET, EXTENDED RELEASE ORAL at 10:01

## 2024-01-28 RX ADMIN — BENZONATATE 100 MG: 100 CAPSULE ORAL at 10:01

## 2024-01-28 RX ADMIN — CEFTRIAXONE SODIUM 2 G: 2 INJECTION, POWDER, FOR SOLUTION INTRAMUSCULAR; INTRAVENOUS at 12:01

## 2024-01-28 RX ADMIN — LEVOTHYROXINE SODIUM 50 MCG: 50 TABLET ORAL at 06:01

## 2024-01-28 NOTE — ASSESSMENT & PLAN NOTE
Patient with baseline CKD (Cr 2.3-2.8) presents with BUN/Cr of 21/3.2, Estimated Creatinine Clearance: 24.7 mL/min (A) (based on SCr of 3.2 mg/dL (H)).    Plan:  - Strict I&O  - Avoid nephrotoxins and renally dose meds

## 2024-01-28 NOTE — PLAN OF CARE
Problem: Adult Inpatient Plan of Care  Goal: Plan of Care Review  Outcome: Ongoing, Progressing  Goal: Patient-Specific Goal (Individualized)  Outcome: Ongoing, Progressing  Goal: Optimal Comfort and Wellbeing  Outcome: Ongoing, Progressing  Goal: Readiness for Transition of Care  Outcome: Ongoing, Progressing     Problem: Fluid and Electrolyte Imbalance (Acute Kidney Injury/Impairment)  Goal: Fluid and Electrolyte Balance  Outcome: Ongoing, Progressing     Problem: Renal Function Impairment (Acute Kidney Injury/Impairment)  Goal: Effective Renal Function  Outcome: Ongoing, Progressing     Problem: Fluid Imbalance (Pneumonia)  Goal: Fluid Balance  Outcome: Ongoing, Progressing     Problem: Infection (Pneumonia)  Goal: Resolution of Infection Signs and Symptoms  Outcome: Ongoing, Progressing     Problem: Respiratory Compromise (Pneumonia)  Goal: Effective Oxygenation and Ventilation  Outcome: Ongoing, Progressing     Problem: COPD (Chronic Obstructive Pulmonary Disease) Comorbidity  Goal: Maintenance of COPD Symptom Control  Outcome: Ongoing, Progressing     Problem: Heart Failure Comorbidity  Goal: Maintenance of Heart Failure Symptom Control  Outcome: Ongoing, Progressing     Problem: Hypertension Comorbidity  Goal: Blood Pressure in Desired Range  Outcome: Ongoing, Progressing     Problem: Fall Injury Risk  Goal: Absence of Fall and Fall-Related Injury  Outcome: Ongoing, Progressing     Patient febrile overnight, physicians are aware, unable to administer Tylenol at this time per physician, antibiotics ordered and administered, no new orders at this time.

## 2024-01-28 NOTE — ASSESSMENT & PLAN NOTE
Patient with baseline CKD (Cr 2.3-2.8) presents with BUN/Cr of 21/3.2, Estimated Creatinine Clearance: 24.7 mL/min (A) (based on SCr of 3.2 mg/dL (H)).    Plan:  - Urine Studies suggesting intrinsic etiology (FeNa 3.9%)  - Strict I&O  - Avoid nephrotoxins and renally dose meds

## 2024-01-28 NOTE — ASSESSMENT & PLAN NOTE
Patient with documented diagnosis of Atrial Fibrillation per notes by Cardiologist Dmitriy Chavarria MD reports being compliant with the following home regimen: Apixaban 5mg BID, Amiodarone. EKG taken during admission reveals Aflutter/A Fib    Plan:  .[id number] Score: 1. . Anticoagulation indicated. Anticoagulation done with Apixaban . Will hold Amiodarone in setting of transaminitis.

## 2024-01-28 NOTE — ASSESSMENT & PLAN NOTE
Patient presents with AST/ALT of 481/302, (24/19 5 months ago). Denies drinking alcohol.    Plan:  Will hold PRN Tylenol  Abdominal ultrasound with no acute abnormalities  Hepatitis panel negative  Further labs pending

## 2024-01-28 NOTE — ASSESSMENT & PLAN NOTE
Patient with reported TSH 5 months ago 13.669    Plan:  Repeat TSH elevated, T4 normal  Continue home regimen

## 2024-01-28 NOTE — ASSESSMENT & PLAN NOTE
Patient with reported TSH 5 months ago 13.669    Plan:  Repeat TSH elevated, T4 normal  Increased home levothyroxine

## 2024-01-28 NOTE — ASSESSMENT & PLAN NOTE
Patient presents with K 2.4. Reportedly takes potassium supplements at home. S/P 40mEq of potassium PO, 10mEq of KCl in ER.    Plan:  - Replete as needed

## 2024-01-28 NOTE — SUBJECTIVE & OBJECTIVE
Interval History: No adverse events overnight. AM labs with decreased Hgb, transaminitis, elevated TSH.    Review of Systems   Constitutional:  Positive for appetite change and fever.   HENT:  Negative for trouble swallowing.    Eyes:  Negative for visual disturbance.   Respiratory:  Positive for cough. Negative for shortness of breath.    Cardiovascular:  Negative for chest pain, palpitations and leg swelling.   Gastrointestinal:  Negative for abdominal pain, constipation, diarrhea, nausea and vomiting.   Genitourinary:  Negative for dysuria and hematuria.   Neurological:  Negative for weakness and headaches.   Psychiatric/Behavioral:  Negative for confusion.      Objective:     Vital Signs (Most Recent):  Temp: 98.6 °F (37 °C) (01/28/24 1115)  Pulse: (!) 57 (01/28/24 1156)  Resp: 18 (01/28/24 1115)  BP: 132/63 (01/28/24 1115)  SpO2: 97 % (01/28/24 1115) Vital Signs (24h Range):  Temp:  [97.8 °F (36.6 °C)-101.7 °F (38.7 °C)] 98.6 °F (37 °C)  Pulse:  [53-66] 57  Resp:  [16-20] 18  SpO2:  [93 %-99 %] 97 %  BP: (132-181)/(60-77) 132/63     Weight: 80.3 kg (177 lb 0.5 oz)  Body mass index is 25.4 kg/m².    Intake/Output Summary (Last 24 hours) at 1/28/2024 1356  Last data filed at 1/28/2024 1246  Gross per 24 hour   Intake 446.84 ml   Output 1400 ml   Net -953.16 ml         Physical Exam  Constitutional:       General: He is not in acute distress.     Appearance: Normal appearance. He is normal weight. He is not ill-appearing or toxic-appearing.   HENT:      Head: Normocephalic.      Right Ear: External ear normal.      Left Ear: External ear normal.      Nose: Nose normal.      Mouth/Throat:      Pharynx: Oropharynx is clear.   Eyes:      Extraocular Movements: Extraocular movements intact.      Comments: Bright red spot remnant of subconjunctival hemorrhage in R eye   Cardiovascular:      Rate and Rhythm: Regular rhythm. Bradycardia present.      Pulses: Normal pulses.   Pulmonary:      Effort: Pulmonary effort is  normal. No respiratory distress.      Breath sounds: Normal breath sounds. No wheezing, rhonchi or rales.   Abdominal:      General: Abdomen is flat. There is no distension.      Palpations: Abdomen is soft.      Tenderness: There is no abdominal tenderness. There is no guarding or rebound.   Musculoskeletal:         General: Normal range of motion.      Cervical back: Normal range of motion.      Right lower leg: No edema.      Left lower leg: No edema.   Skin:     General: Skin is warm.      Coloration: Skin is not jaundiced.   Neurological:      General: No focal deficit present.      Mental Status: He is alert and oriented to person, place, and time.      Motor: No weakness.   Psychiatric:         Mood and Affect: Mood normal.         Behavior: Behavior normal.         Thought Content: Thought content normal.             Significant Labs: All pertinent labs within the past 24 hours have been reviewed.  CBC:   Recent Labs   Lab 01/27/24  1046 01/28/24  0318   WBC 6.15 5.24   HGB 10.6* 8.8*   HCT 30.5* 24.1*   * 76*     CMP:   Recent Labs   Lab 01/27/24  1046 01/27/24  1504 01/28/24  0318     --  133*   K 2.4* 2.9* 2.4*     --  99   CO2 24  --  23     --  90   BUN 21  --  25*   CREATININE 3.2*  --  3.5*   CALCIUM 8.6*  --  7.9*   PROT 6.4  --  5.3*   ALBUMIN 2.7*  --  2.2*   BILITOT 0.4  --  0.7   ALKPHOS 51*  --  51*   *  --  3,134*   *  --  1,434*   ANIONGAP 12  --  11       Significant Imaging: I have reviewed all pertinent imaging results/findings within the past 24 hours.    EXAMINATION:  US ABDOMEN LIMITED     CLINICAL HISTORY:  fever, transaminitis;     TECHNIQUE:  Limited ultrasound of the right upper quadrant of the abdomen (including pancreas, liver, gallbladder, common bile duct, and spleen) was performed.     COMPARISON:  None.     FINDINGS:  Liver: Normal in size, measuring 14.7 cm. Homogeneous echotexture. No focal hepatic lesions.     Pancreas: Visualized  portion is unremarkable.     Gallbladder: No calculi, wall thickening, or pericholecystic fluid.  No sonographic Valdivia's sign.     Biliary system: The common duct is not dilated, measuring 2 mm.  No intrahepatic ductal dilatation.     Spleen: Normal in size and echotexture, measuring 9.8 cm.     Miscellaneous: No upper abdominal ascites.     Impression:     No significant sonographic abnormality.        Electronically signed by: Garo Pringle MD  Date:                                            01/28/2024  Time:                                           09:54

## 2024-01-28 NOTE — PROGRESS NOTES
"[facility]  Progress Note    Patient Name: Domingo Gross  MRN: [id number]  Patient Class: IP- Inpatient   Admission Date: 1/27/2024  Length of Stay: 1 days  Attending Physician: Coleen Packer MD  Primary Care Provider: Analy Encarnacion MD        Subjective:     Principal Problem:CAP (community acquired pneumonia)        HPI:  63-year-old man with PMHx of HTN, Atrial Fibrillation (sees Dr. Calderón, on Amiodarone, Apixaban), COPD (inhaler PRN), CKD (sees Dr. Payne), Hypothyroidism (Levothyroxine) presents via ambulance to [facility] on 1/27/24 for 1-day history of chest pain, cough, irregular breathing. History was collected from patient and wife, who is at bedside.    Patient reports experiencing chest pain, cough, and decreased appetite that started last night. Reports a fever of 102, did not take Tylenol. This morning, wife noticed that he was breathing "funny" and called the ambulance. Patient's boss recently tested positive for Flu/COVID. Patient emphasized that lack of food intake was due to decreased appetite and denied emesis and nausea.    Currently patient denies any chest pain or difficulty breathing. He reports taking potassium at home & being compliant with all medications. He is a former smoker and denies drinking alcohol.    In the ER, vitals were as follows - Temp 99.8 -> 102.3, HR 68, /87, O2 94%. Labs were significant for K 2.4, Magnesium 1.3, BUN/Cr 21/3.2 (baseline 2.8), AST//302, Alk phos 51 WBC 6.15, Procal 0.28. Patient was given 40mEq of potassium PO, 10mEq of KCl and admitted to [facility] for management/further workup of of Hypokalemia, BRETT, PNA, Transaminitis.    Overview/Hospital Course:  No notes on file    Interval History: No adverse events overnight. AM labs with decreased Hgb, transaminitis, elevated TSH.    Review of Systems   Constitutional:  Positive for appetite change and fever.   HENT:  Negative for trouble " swallowing.    Eyes:  Negative for visual disturbance.   Respiratory:  Positive for cough. Negative for shortness of breath.    Cardiovascular:  Negative for chest pain, palpitations and leg swelling.   Gastrointestinal:  Negative for abdominal pain, constipation, diarrhea, nausea and vomiting.   Genitourinary:  Negative for dysuria and hematuria.   Neurological:  Negative for weakness and headaches.   Psychiatric/Behavioral:  Negative for confusion.      Objective:     Vital Signs (Most Recent):  Temp: 98.6 °F (37 °C) (01/28/24 1115)  Pulse: (!) 57 (01/28/24 1156)  Resp: 18 (01/28/24 1115)  BP: 132/63 (01/28/24 1115)  SpO2: 97 % (01/28/24 1115) Vital Signs (24h Range):  Temp:  [97.8 °F (36.6 °C)-101.7 °F (38.7 °C)] 98.6 °F (37 °C)  Pulse:  [53-66] 57  Resp:  [16-20] 18  SpO2:  [93 %-99 %] 97 %  BP: (132-181)/(60-77) 132/63     Weight: 80.3 kg (177 lb 0.5 oz)  Body mass index is 25.4 kg/m².    Intake/Output Summary (Last 24 hours) at 1/28/2024 1356  Last data filed at 1/28/2024 1246  Gross per 24 hour   Intake 446.84 ml   Output 1400 ml   Net -953.16 ml         Physical Exam  Constitutional:       General: He is not in acute distress.     Appearance: Normal appearance. He is normal weight. He is not ill-appearing or toxic-appearing.   HENT:      Head: Normocephalic.      Right Ear: External ear normal.      Left Ear: External ear normal.      Nose: Nose normal.      Mouth/Throat:      Pharynx: Oropharynx is clear.   Eyes:      Extraocular Movements: Extraocular movements intact.      Comments: Bright red spot remnant of subconjunctival hemorrhage in R eye   Cardiovascular:      Rate and Rhythm: Regular rhythm. Bradycardia present.      Pulses: Normal pulses.   Pulmonary:      Effort: Pulmonary effort is normal. No respiratory distress.      Breath sounds: Normal breath sounds. No wheezing, rhonchi or rales.   Abdominal:      General: Abdomen is flat. There is no distension.      Palpations: Abdomen is soft.       Tenderness: There is no abdominal tenderness. There is no guarding or rebound.   Musculoskeletal:         General: Normal range of motion.      Cervical back: Normal range of motion.      Right lower leg: No edema.      Left lower leg: No edema.   Skin:     General: Skin is warm.      Coloration: Skin is not jaundiced.   Neurological:      General: No focal deficit present.      Mental Status: He is alert and oriented to person, place, and time.      Motor: No weakness.   Psychiatric:         Mood and Affect: Mood normal.         Behavior: Behavior normal.         Thought Content: Thought content normal.             Significant Labs: All pertinent labs within the past 24 hours have been reviewed.  CBC:   Recent Labs   Lab 01/27/24  1046 01/28/24  0318   WBC 6.15 5.24   HGB 10.6* 8.8*   HCT 30.5* 24.1*   * 76*     CMP:   Recent Labs   Lab 01/27/24  1046 01/27/24  1504 01/28/24  0318     --  133*   K 2.4* 2.9* 2.4*     --  99   CO2 24  --  23     --  90   BUN 21  --  25*   CREATININE 3.2*  --  3.5*   CALCIUM 8.6*  --  7.9*   PROT 6.4  --  5.3*   ALBUMIN 2.7*  --  2.2*   BILITOT 0.4  --  0.7   ALKPHOS 51*  --  51*   *  --  3,134*   *  --  1,434*   ANIONGAP 12  --  11       Significant Imaging: I have reviewed all pertinent imaging results/findings within the past 24 hours.    EXAMINATION:  US ABDOMEN LIMITED     CLINICAL HISTORY:  fever, transaminitis;     TECHNIQUE:  Limited ultrasound of the right upper quadrant of the abdomen (including pancreas, liver, gallbladder, common bile duct, and spleen) was performed.     COMPARISON:  None.     FINDINGS:  Liver: Normal in size, measuring 14.7 cm. Homogeneous echotexture. No focal hepatic lesions.     Pancreas: Visualized portion is unremarkable.     Gallbladder: No calculi, wall thickening, or pericholecystic fluid.  No sonographic Valdivia's sign.     Biliary system: The common duct is not dilated, measuring 2 mm.  No intrahepatic  "ductal dilatation.     Spleen: Normal in size and echotexture, measuring 9.8 cm.     Miscellaneous: No upper abdominal ascites.     Impression:     No significant sonographic abnormality.        Electronically signed by: Garo Pringle MD  Date:                                            01/28/2024  Time:                                           09:54    Assessment/Plan:      * CAP (community acquired pneumonia)  Patient presented with "chest hurts", cough, decreased appetite. Temp 103 On RA, sating 94-96%. WBC 6.15, Lactic acid 1.2, Procal 0.28.  CXR: Increased lung opacities, concerning for interstitial edema and/or pneumonitis.  Short-term imaging follow-up could be performed to ensure resolution.    Plan:  Blood cultures pending  Continue Ceftriaxone + Doxycycline; broaden if necessary    Hypomagnesemia  Patient presents with Magnesium 1.3    Plan:  Replete as needed.    Acute liver failure without hepatic coma  Patient presents with AST/ALT of 481/302, (24/19 5 months ago). Denies drinking alcohol.    Plan:  Will hold PRN Tylenol  Abdominal ultrasound with no acute abnormalities  Hepatitis panel negative  Further labs pending    Hypothyroidism  Patient with reported TSH 5 months ago 13.669    Plan:  Repeat TSH elevated, T4 normal  Continue home regimen    Persistent atrial fibrillation  Patient with documented diagnosis of Atrial Fibrillation per notes by Cardiologist Dmitriy Chavarria MD reports being compliant with the following home regimen: Apixaban 5mg BID, Amiodarone. EKG taken during admission reveals Aflutter/A Fib    Plan:  .[id number] Score: 1. . Anticoagulation indicated. Anticoagulation done with Apixaban . Will hold Amiodarone in setting of transaminitis.    BRETT (acute kidney injury)  Patient with baseline CKD (Cr 2.3-2.8) presents with BUN/Cr of 21/3.2, Estimated Creatinine Clearance: 24.7 mL/min (A) (based on SCr of 3.2 mg/dL (H)).    Plan:  - Strict I&O  - Avoid nephrotoxins and renally dose " meds    Hypokalemia  Patient presents with K 2.4. Reportedly takes potassium supplements at home. S/P 40mEq of potassium PO, 10mEq of KCl in ER.    Plan:  - Replete as needed    Hyperlipidemia  Patient with known history of HLD reports being compliant with the following home regimen: Rosuvastatin 40mg QD    Plan:  Will hold statin in setting of elevated transaminitis  Rosuvastatin not available in hospital; will use Atorvastatin equivalent of 80 mg QD when appropriate    HTN (hypertension)  Patient with known history of HTN reports being compliant with the following home regimen: Carvedilol 25mg BID, Eplerenone 50mg QD, Imdur 60mg QD. Patient's pressures on admission to the ER are (143-198)/(64-88) - elevated likely secondary to not taking medications before arriving to the hospital.    Plan:  - Continue home Carvedilol and Imdur  - Eplerenone not available in hospital; in setting of his hypokalemia, will use Spironolactone if additional antihypertensives are required  - If pressures reach above 180 systolic or 100 diastolic, will use PRN medications      VTE Risk Mitigation (From admission, onward)           Ordered     IP VTE HIGH RISK PATIENT  Once         01/27/24 1240     Place sequential compression device  Until discontinued         01/27/24 1240                    Discharge Planning   FLACO:      Code Status: Full Code   Is the patient medically ready for discharge?:     Reason for patient still in hospital (select all that apply): Patient trending condition, Laboratory test, Treatment, Consult recommendations, and Pending disposition             ________________________  Jude Sanford MD  [facility] PGY-2

## 2024-01-29 LAB
ALBUMIN SERPL BCP-MCNC: 2.2 G/DL (ref 3.5–5.2)
ALP SERPL-CCNC: 62 U/L (ref 55–135)
ALT SERPL W/O P-5'-P-CCNC: 1928 U/L (ref 10–44)
ANA PATTERN 1: NORMAL
ANA SER QL IF: POSITIVE
ANA TITR SER IF: NORMAL {TITER}
ANION GAP SERPL CALC-SCNC: 8 MMOL/L (ref 8–16)
ANION GAP SERPL CALC-SCNC: 8 MMOL/L (ref 8–16)
AST SERPL-CCNC: 3928 U/L (ref 10–40)
BASOPHILS # BLD AUTO: 0.01 K/UL (ref 0–0.2)
BASOPHILS NFR BLD: 0.2 % (ref 0–1.9)
BILIRUB SERPL-MCNC: 1.5 MG/DL (ref 0.1–1)
BUN SERPL-MCNC: 33 MG/DL (ref 8–23)
BUN SERPL-MCNC: 35 MG/DL (ref 8–23)
CALCIUM SERPL-MCNC: 7.9 MG/DL (ref 8.7–10.5)
CALCIUM SERPL-MCNC: 8 MG/DL (ref 8.7–10.5)
CHLORIDE SERPL-SCNC: 104 MMOL/L (ref 95–110)
CHLORIDE SERPL-SCNC: 105 MMOL/L (ref 95–110)
CMV DNA SPEC QL NAA+PROBE: NORMAL
CO2 SERPL-SCNC: 18 MMOL/L (ref 23–29)
CO2 SERPL-SCNC: 20 MMOL/L (ref 23–29)
CREAT SERPL-MCNC: 3.9 MG/DL (ref 0.5–1.4)
CREAT SERPL-MCNC: 4 MG/DL (ref 0.5–1.4)
CYTOMEGALOVIRUS PCR, QUANT: NOT DETECTED IU/ML
DIFFERENTIAL METHOD BLD: ABNORMAL
EOSINOPHIL # BLD AUTO: 0 K/UL (ref 0–0.5)
EOSINOPHIL NFR BLD: 0.2 % (ref 0–8)
ERYTHROCYTE [DISTWIDTH] IN BLOOD BY AUTOMATED COUNT: 12.2 % (ref 11.5–14.5)
EST. GFR  (NO RACE VARIABLE): 16 ML/MIN/1.73 M^2
EST. GFR  (NO RACE VARIABLE): 17 ML/MIN/1.73 M^2
GLUCOSE SERPL-MCNC: 113 MG/DL (ref 70–110)
GLUCOSE SERPL-MCNC: 97 MG/DL (ref 70–110)
HCT VFR BLD AUTO: 28.3 % (ref 40–54)
HGB BLD-MCNC: 9.8 G/DL (ref 14–18)
IMM GRANULOCYTES # BLD AUTO: 0.03 K/UL (ref 0–0.04)
IMM GRANULOCYTES NFR BLD AUTO: 0.5 % (ref 0–0.5)
LYMPHOCYTES # BLD AUTO: 0.4 K/UL (ref 1–4.8)
LYMPHOCYTES NFR BLD: 6.5 % (ref 18–48)
MAGNESIUM SERPL-MCNC: 2.2 MG/DL (ref 1.6–2.6)
MCH RBC QN AUTO: 29 PG (ref 27–31)
MCHC RBC AUTO-ENTMCNC: 34.6 G/DL (ref 32–36)
MCV RBC AUTO: 84 FL (ref 82–98)
MONOCYTES # BLD AUTO: 0.2 K/UL (ref 0.3–1)
MONOCYTES NFR BLD: 2.6 % (ref 4–15)
NEUTROPHILS # BLD AUTO: 5.2 K/UL (ref 1.8–7.7)
NEUTROPHILS NFR BLD: 90 % (ref 38–73)
NRBC BLD-RTO: 0 /100 WBC
PHOSPHATE SERPL-MCNC: 1.3 MG/DL (ref 2.7–4.5)
PLATELET # BLD AUTO: 76 K/UL (ref 150–450)
PMV BLD AUTO: 11.8 FL (ref 9.2–12.9)
POTASSIUM SERPL-SCNC: 3.8 MMOL/L (ref 3.5–5.1)
POTASSIUM SERPL-SCNC: 4.6 MMOL/L (ref 3.5–5.1)
PROT SERPL-MCNC: 5.4 G/DL (ref 6–8.4)
RBC # BLD AUTO: 3.38 M/UL (ref 4.6–6.2)
SMOOTH MUSCLE AB TITR SER IF: NORMAL {TITER}
SODIUM SERPL-SCNC: 131 MMOL/L (ref 136–145)
SODIUM SERPL-SCNC: 132 MMOL/L (ref 136–145)
WBC # BLD AUTO: 5.82 K/UL (ref 3.9–12.7)

## 2024-01-29 PROCEDURE — 80048 BASIC METABOLIC PNL TOTAL CA: CPT | Mod: XB

## 2024-01-29 PROCEDURE — 83735 ASSAY OF MAGNESIUM: CPT

## 2024-01-29 PROCEDURE — 25000003 PHARM REV CODE 250

## 2024-01-29 PROCEDURE — 25000003 PHARM REV CODE 250: Performed by: FAMILY MEDICINE

## 2024-01-29 PROCEDURE — 80053 COMPREHEN METABOLIC PANEL: CPT

## 2024-01-29 PROCEDURE — 85025 COMPLETE CBC W/AUTO DIFF WBC: CPT

## 2024-01-29 PROCEDURE — 87799 DETECT AGENT NOS DNA QUANT: CPT | Performed by: STUDENT IN AN ORGANIZED HEALTH CARE EDUCATION/TRAINING PROGRAM

## 2024-01-29 PROCEDURE — 36415 COLL VENOUS BLD VENIPUNCTURE: CPT | Mod: XB

## 2024-01-29 PROCEDURE — 63600175 PHARM REV CODE 636 W HCPCS

## 2024-01-29 PROCEDURE — 63700000 PHARM REV CODE 250 ALT 637 W/O HCPCS: Performed by: STUDENT IN AN ORGANIZED HEALTH CARE EDUCATION/TRAINING PROGRAM

## 2024-01-29 PROCEDURE — 11000001 HC ACUTE MED/SURG PRIVATE ROOM

## 2024-01-29 PROCEDURE — 84100 ASSAY OF PHOSPHORUS: CPT

## 2024-01-29 RX ORDER — POTASSIUM CHLORIDE 20 MEQ/1
40 TABLET, EXTENDED RELEASE ORAL EVERY 4 HOURS
Status: COMPLETED | OUTPATIENT
Start: 2024-01-29 | End: 2024-01-29

## 2024-01-29 RX ORDER — ONDANSETRON HYDROCHLORIDE 2 MG/ML
4 INJECTION, SOLUTION INTRAVENOUS EVERY 6 HOURS PRN
Status: DISCONTINUED | OUTPATIENT
Start: 2024-01-29 | End: 2024-02-09 | Stop reason: HOSPADM

## 2024-01-29 RX ORDER — SODIUM CHLORIDE 9 MG/ML
INJECTION, SOLUTION INTRAVENOUS
Status: DISCONTINUED | OUTPATIENT
Start: 2024-01-29 | End: 2024-02-09 | Stop reason: HOSPADM

## 2024-01-29 RX ADMIN — SODIUM CHLORIDE, POTASSIUM CHLORIDE, SODIUM LACTATE AND CALCIUM CHLORIDE 500 ML: 600; 310; 30; 20 INJECTION, SOLUTION INTRAVENOUS at 09:01

## 2024-01-29 RX ADMIN — LEVOTHYROXINE SODIUM 75 MCG: 75 TABLET ORAL at 05:01

## 2024-01-29 RX ADMIN — BENZONATATE 100 MG: 100 CAPSULE ORAL at 01:01

## 2024-01-29 RX ADMIN — POTASSIUM CHLORIDE 40 MEQ: 1500 TABLET, EXTENDED RELEASE ORAL at 05:01

## 2024-01-29 RX ADMIN — CEFTRIAXONE SODIUM 2 G: 2 INJECTION, POWDER, FOR SOLUTION INTRAMUSCULAR; INTRAVENOUS at 01:01

## 2024-01-29 RX ADMIN — CEFTRIAXONE SODIUM 2 G: 2 INJECTION, POWDER, FOR SOLUTION INTRAMUSCULAR; INTRAVENOUS at 11:01

## 2024-01-29 RX ADMIN — CARVEDILOL 25 MG: 25 TABLET, FILM COATED ORAL at 04:01

## 2024-01-29 RX ADMIN — CARVEDILOL 25 MG: 25 TABLET, FILM COATED ORAL at 08:01

## 2024-01-29 RX ADMIN — BENZONATATE 100 MG: 100 CAPSULE ORAL at 05:01

## 2024-01-29 RX ADMIN — POTASSIUM PHOSPHATE, MONOBASIC POTASSIUM PHOSPHATE, DIBASIC 30 MMOL: 224; 236 INJECTION, SOLUTION, CONCENTRATE INTRAVENOUS at 06:01

## 2024-01-29 RX ADMIN — POTASSIUM CHLORIDE 40 MEQ: 1500 TABLET, EXTENDED RELEASE ORAL at 02:01

## 2024-01-29 RX ADMIN — CEFTRIAXONE SODIUM 2 G: 2 INJECTION, POWDER, FOR SOLUTION INTRAMUSCULAR; INTRAVENOUS at 12:01

## 2024-01-29 RX ADMIN — ONDANSETRON 4 MG: 2 INJECTION INTRAMUSCULAR; INTRAVENOUS at 02:01

## 2024-01-29 RX ADMIN — AZITHROMYCIN 500 MG: 250 TABLET, FILM COATED ORAL at 08:01

## 2024-01-29 RX ADMIN — SODIUM CHLORIDE: 9 INJECTION, SOLUTION INTRAVENOUS at 01:01

## 2024-01-29 RX ADMIN — ISOSORBIDE MONONITRATE 60 MG: 30 TABLET, EXTENDED RELEASE ORAL at 08:01

## 2024-01-29 RX ADMIN — CLOPIDOGREL BISULFATE 75 MG: 75 TABLET ORAL at 08:01

## 2024-01-29 NOTE — PROGRESS NOTES
"[facility] Medicine  Progress Note    Patient Name: Domingo Gross  MRN: [id number]  Patient Class: IP- Inpatient   Admission Date: 1/27/2024  Length of Stay: 2 days  Attending Physician: Garo Chase III, MD  Primary Care Provider: Analy Encarnacion MD        Subjective:     Principal Problem:CAP (community acquired pneumonia)        HPI:  63-year-old man with PMHx of HTN, Atrial Fibrillation (sees Dr. Calderón, on Amiodarone, Apixaban), COPD (inhaler PRN), CKD (sees Dr. Payne), Hypothyroidism (Levothyroxine) presents via ambulance to [facility] on 1/27/24 for 1-day history of chest pain, cough, irregular breathing. History was collected from patient and wife, who is at bedside.    Patient reports experiencing chest pain, cough, and decreased appetite that started last night. Reports a fever of 102, did not take Tylenol. This morning, wife noticed that he was breathing "funny" and called the ambulance. Patient's boss recently tested positive for Flu/COVID. Patient emphasized that lack of food intake was due to decreased appetite and denied emesis and nausea.    Currently patient denies any chest pain or difficulty breathing. He reports taking potassium at home & being compliant with all medications. He is a former smoker and denies drinking alcohol.    In the ER, vitals were as follows - Temp 99.8 -> 102.3, HR 68, /87, O2 94%. Labs were significant for K 2.4, Magnesium 1.3, BUN/Cr 21/3.2 (baseline 2.8), AST//302, Alk phos 51 WBC 6.15, Procal 0.28. Patient was given 40mEq of potassium PO, 10mEq of KCl and admitted to [facility] for management/further workup of of Hypokalemia, BRETT, PNA, Transaminitis.    Overview/Hospital Course:  No notes on file    Interval History: No adverse events overnight.    Review of Systems   Constitutional:  Positive for appetite change. Negative for chills and fever.   HENT:  Negative for rhinorrhea, sore throat and trouble swallowing.  "   Eyes:  Negative for pain and visual disturbance.   Respiratory:  Negative for cough and shortness of breath.    Cardiovascular:  Negative for chest pain, palpitations and leg swelling.   Gastrointestinal:  Negative for abdominal pain, constipation, diarrhea, nausea and vomiting.   Genitourinary:  Negative for dysuria and hematuria.   Musculoskeletal:  Negative for gait problem and neck stiffness.   Skin:  Negative for rash and wound.   Neurological:  Negative for tremors, weakness and headaches.   Psychiatric/Behavioral:  Negative for agitation and confusion.      Objective:     Vital Signs (Most Recent):  Temp: 100.2 °F (37.9 °C) (01/29/24 0752)  Pulse: 67 (01/29/24 0752)  Resp: 18 (01/29/24 0752)  BP: (!) 144/66 (01/29/24 0752)  SpO2: 97 % (01/29/24 0752) Vital Signs (24h Range):  Temp:  [97.8 °F (36.6 °C)-102.7 °F (39.3 °C)] 100.2 °F (37.9 °C)  Pulse:  [53-74] 67  Resp:  [18-22] 18  SpO2:  [96 %-97 %] 97 %  BP: (128-168)/(60-75) 144/66     Weight: 82.1 kg (181 lb)  Body mass index is 25.97 kg/m².    Intake/Output Summary (Last 24 hours) at 1/29/2024 0856  Last data filed at 1/29/2024 0721  Gross per 24 hour   Intake 755.87 ml   Output 1750 ml   Net -994.13 ml         Physical Exam  Constitutional:       General: He is not in acute distress.     Appearance: Normal appearance. He is normal weight. He is not ill-appearing or toxic-appearing.   HENT:      Head: Normocephalic.      Right Ear: External ear normal.      Left Ear: External ear normal.      Nose: Nose normal.      Mouth/Throat:      Pharynx: Oropharynx is clear.   Eyes:      Extraocular Movements: Extraocular movements intact.      Comments: Bright red spot remnant of subconjunctival hemorrhage in R eye   Cardiovascular:      Rate and Rhythm: Regular rhythm. Bradycardia present.      Pulses: Normal pulses.   Pulmonary:      Effort: Pulmonary effort is normal. No respiratory distress.      Breath sounds: Normal breath sounds. No wheezing, rhonchi or  "rales.   Abdominal:      General: Abdomen is flat. There is no distension.      Palpations: Abdomen is soft.      Tenderness: There is no abdominal tenderness. There is no guarding or rebound.   Musculoskeletal:         General: Normal range of motion.      Cervical back: Normal range of motion.      Right lower leg: No edema.      Left lower leg: No edema.   Skin:     General: Skin is warm.      Coloration: Skin is not jaundiced.   Neurological:      General: No focal deficit present.      Mental Status: He is alert and oriented to person, place, and time.      Motor: No weakness.   Psychiatric:         Mood and Affect: Mood normal.         Behavior: Behavior normal.         Thought Content: Thought content normal.             Significant Labs: All pertinent labs within the past 24 hours have been reviewed.  CBC:   Recent Labs   Lab 01/27/24  1046 01/28/24  0318 01/29/24  0309   WBC 6.15 5.24 5.82   HGB 10.6* 8.8* 9.8*   HCT 30.5* 24.1* 28.3*   * 76* 76*     CMP:   Recent Labs   Lab 01/27/24  1046 01/27/24  1504 01/28/24  0318 01/28/24  1432 01/28/24  2242 01/29/24  0309     --  133* 135* 131* 132*   K 2.4*   < > 2.4* 2.8* 3.5 3.8     --  99 100 103 104   CO2 24  --  23 23 20* 20*     --  90 102 116* 97   BUN 21  --  25* 29* 32* 33*   CREATININE 3.2*  --  3.5* 3.8* 3.9* 3.9*   CALCIUM 8.6*  --  7.9* 7.9* 8.0* 8.0*   PROT 6.4  --  5.3*  --   --  5.4*   ALBUMIN 2.7*  --  2.2*  --   --  2.2*   BILITOT 0.4  --  0.7  --   --  1.5*   ALKPHOS 51*  --  51*  --   --  62   *  --  3,134*  --   --  3,928*   *  --  1,434*  --   --  1,928*   ANIONGAP 12  --  11 12 8 8    < > = values in this interval not displayed.       Significant Imaging: I have reviewed all pertinent imaging results/findings within the past 24 hours.    Assessment/Plan:      * CAP (community acquired pneumonia)  Patient presented with "chest hurts", cough, decreased appetite. Temp 103 On RA, sating 94-96%. WBC 6.15, " Lactic acid 1.2, Procal 0.28.  CXR: Increased lung opacities, concerning for interstitial edema and/or pneumonitis.  Short-term imaging follow-up could be performed to ensure resolution.    Plan:  Blood cultures pending (NGTD)  Continue Ceftriaxone + Doxycycline; broaden if necessary    Hypomagnesemia  Plan:  Replete as needed.    Acute liver failure without hepatic coma  Patient presents with AST/ALT of 481/302, (24/19 5 months ago). Denies drinking alcohol.    Plan:  Will hold PRN Tylenol  Abdominal ultrasound with no acute abnormalities  Hepatitis panel negative  Further labs pending    Hypothyroidism  Patient with reported TSH 5 months ago 13.669    Plan:  Repeat TSH elevated, T4 normal  Increased home levothyroxine    Persistent atrial fibrillation  Patient with documented diagnosis of Atrial Fibrillation per notes by Cardiologist Dmitriy Chavarria MD reports being compliant with the following home regimen: Apixaban 5mg BID, Amiodarone. EKG taken during admission reveals Aflutter/A Fib    Plan:  - [id number] Score: 1  - Anticoagulation indicated. Anticoagulation done with Apixaban .   - Will hold Amiodarone in setting of transaminitis.    BRETT (acute kidney injury)  Patient with baseline CKD (Cr 2.3-2.8) presents with BUN/Cr of 21/3.2, Estimated Creatinine Clearance: 24.7 mL/min (A) (based on SCr of 3.2 mg/dL (H)).    Plan:  - Urine Studies suggesting intrinsic etiology (FeNa 3.9%)  - Strict I&O  - Avoid nephrotoxins and renally dose meds    Hypokalemia  Patient presents with K 2.4 on admission. Reportedly takes potassium supplements at home. S/P 40mEq of potassium PO, 10mEq of KCl in ER.    Plan:  - Replete as needed    Hyperlipidemia  Patient with known history of HLD reports being compliant with the following home regimen: Rosuvastatin 40mg QD    Plan:  Will hold statin in setting of elevated transaminitis  Rosuvastatin not available in hospital; will use Atorvastatin equivalent of 80 mg QD when appropriate    HTN  (hypertension)  Patient with known history of HTN reports being compliant with the following home regimen: Carvedilol 25mg BID, Eplerenone 50mg QD, Imdur 60mg QD. Patient's pressures on admission to the ER are (143-198)/(64-88) - elevated likely secondary to not taking medications before arriving to the hospital.    Plan:  - Continue home Carvedilol and Imdur  - Eplerenone not available in hospital; in setting of his hypokalemia, will use Spironolactone if additional antihypertensives are required  - If pressures reach above 180 systolic or 100 diastolic, will use PRN medications      VTE Risk Mitigation (From admission, onward)           Ordered     IP VTE HIGH RISK PATIENT  Once         01/27/24 1240     Place sequential compression device  Until discontinued         01/27/24 1240                    Discharge Planning   FLACO:      Code Status: Full Code   Is the patient medically ready for discharge?:     Reason for patient still in hospital (select all that apply): Patient trending condition, Laboratory test, Treatment, and Pending disposition           ________________________  Jude Sanford MD  [facility] PGY-2

## 2024-01-29 NOTE — ASSESSMENT & PLAN NOTE
Patient with documented diagnosis of Atrial Fibrillation per notes by Cardiologist Dmitriy Chavarria MD reports being compliant with the following home regimen: Apixaban 5mg BID, Amiodarone. EKG taken during admission reveals Aflutter/A Fib    Plan:  - [id number] Score: 1  - Anticoagulation indicated. Anticoagulation done with Apixaban .   - Will hold Amiodarone in setting of transaminitis.

## 2024-01-29 NOTE — PLAN OF CARE
Problem: Adult Inpatient Plan of Care  Goal: Plan of Care Review  Outcome: Ongoing, Progressing  Goal: Patient-Specific Goal (Individualized)  Outcome: Ongoing, Progressing  Goal: Optimal Comfort and Wellbeing  Outcome: Ongoing, Progressing  Goal: Readiness for Transition of Care  Outcome: Ongoing, Progressing     Problem: Renal Function Impairment (Acute Kidney Injury/Impairment)  Goal: Effective Renal Function  Outcome: Ongoing, Progressing     Problem: Infection (Pneumonia)  Goal: Resolution of Infection Signs and Symptoms  Outcome: Ongoing, Progressing     Problem: Electrolyte Imbalance  Goal: Electrolyte Balance  Outcome: Ongoing, Progressing     Problem: Heart Failure Comorbidity  Goal: Maintenance of Heart Failure Symptom Control  Outcome: Ongoing, Progressing     Problem: Hypertension Comorbidity  Goal: Blood Pressure in Desired Range  Outcome: Ongoing, Progressing     Problem: Fall Injury Risk  Goal: Absence of Fall and Fall-Related Injury  Outcome: Ongoing, Progressing     Problem: Skin Injury Risk Increased  Goal: Skin Health and Integrity  Outcome: Ongoing, Progressing     0400- Pt is febrile, temp of 102.7, went to assess pt, pt denies chills, sweating and body is not hot to touch; informed physicians, Tylenol on hold and pt unable to receive Ibuprofen at this time. Placed ice bags under pt's arms and will retake Pt's temp. No new orders at this time.

## 2024-01-29 NOTE — PLAN OF CARE
CM met with pt and wife Karmen  [phone]     dx:  Viral Syn, BRETT, COPD, fever   Pt drives - wife doesn't  ---   Pt will need transportation to home at discharge     Pt has a Nephrologist, Cardiologist and Oncologist  as well as pcp.     No dme, hh prior to admit  '  pcp and Nephrology f/u apts (ESPERANZA Payne) requested     Future Appointments   Date Time Provider Department Center   2/29/2024  7:15 AM LAB[facility] [place]   3/5/2024  2:40 PM Analy Encarnacion MD [facility] [place]   3/13/2024  9:30 AM APPOINTMENT [facility]   3/13/2024 10:00 AM Robby Reddy MD [facility] [place] [facility]        01/29/24 1511   Discharge Assessment   Assessment Type Discharge Planning Assessment   Confirmed/corrected address, phone number and insurance Yes   Confirmed Demographics Correct on Facesheet   Source of Information patient;family;health record   Communicated FLACO with patient/caregiver Yes   Reason For Admission BRETT, Viral Syndrome, fever   People in Home child(guillermina), adult;spouse  (Wife Karmen  [phone])   Do you expect to return to your current living situation? Yes   Do you have help at home or someone to help you manage your care at home? Yes   Who are your caregiver(s) and their phone number(s)? wife Karmen and 19 yo grandson   Prior to hospitilization cognitive status: Alert/Oriented   Current cognitive status: Alert/Oriented   Walking or Climbing Stairs Difficulty no   Dressing/Bathing Difficulty no   Do you have any problems with:   (pt drives, works)   Equipment Currently Used at Home none   Readmission within 30 days? No   Patient currently being followed by outpatient case management? No   Do you currently have service(s) that help you manage your care at home? No   Do you take prescription medications? Yes   Do you have any problems affording any of your prescribed medications? No   Is the patient taking medications as prescribed? yes   Who is going to help you get home  at discharge? will need transportation to home at d/c   How do you get to doctors appointments? car, drives self   Are you on dialysis? No   Do you take coumadin? No   Discharge Plan A Home;Home with family   Discharge Plan B Home;Home with family;Home Health   Transition of Care Barriers None

## 2024-01-29 NOTE — ASSESSMENT & PLAN NOTE
"Patient presented with "chest hurts", cough, decreased appetite. Temp 103 On RA, sating 94-96%. WBC 6.15, Lactic acid 1.2, Procal 0.28.  CXR: Increased lung opacities, concerning for interstitial edema and/or pneumonitis.  Short-term imaging follow-up could be performed to ensure resolution.    Plan:  Blood cultures pending (NGTD)  Continue Ceftriaxone + Doxycycline; broaden if necessary  "

## 2024-01-29 NOTE — ASSESSMENT & PLAN NOTE
Patient presents with K 2.4 on admission. Reportedly takes potassium supplements at home. S/P 40mEq of potassium PO, 10mEq of KCl in ER.    Plan:  - Replete as needed

## 2024-01-29 NOTE — SUBJECTIVE & OBJECTIVE
Interval History: No adverse events overnight.    Review of Systems   Constitutional:  Positive for appetite change. Negative for chills and fever.   HENT:  Negative for rhinorrhea, sore throat and trouble swallowing.    Eyes:  Negative for pain and visual disturbance.   Respiratory:  Negative for cough and shortness of breath.    Cardiovascular:  Negative for chest pain, palpitations and leg swelling.   Gastrointestinal:  Negative for abdominal pain, constipation, diarrhea, nausea and vomiting.   Genitourinary:  Negative for dysuria and hematuria.   Musculoskeletal:  Negative for gait problem and neck stiffness.   Skin:  Negative for rash and wound.   Neurological:  Negative for tremors, weakness and headaches.   Psychiatric/Behavioral:  Negative for agitation and confusion.      Objective:     Vital Signs (Most Recent):  Temp: 100.2 °F (37.9 °C) (01/29/24 0752)  Pulse: 67 (01/29/24 0752)  Resp: 18 (01/29/24 0752)  BP: (!) 144/66 (01/29/24 0752)  SpO2: 97 % (01/29/24 0752) Vital Signs (24h Range):  Temp:  [97.8 °F (36.6 °C)-102.7 °F (39.3 °C)] 100.2 °F (37.9 °C)  Pulse:  [53-74] 67  Resp:  [18-22] 18  SpO2:  [96 %-97 %] 97 %  BP: (128-168)/(60-75) 144/66     Weight: 82.1 kg (181 lb)  Body mass index is 25.97 kg/m².    Intake/Output Summary (Last 24 hours) at 1/29/2024 0856  Last data filed at 1/29/2024 0721  Gross per 24 hour   Intake 755.87 ml   Output 1750 ml   Net -994.13 ml         Physical Exam  Constitutional:       General: He is not in acute distress.     Appearance: Normal appearance. He is normal weight. He is not ill-appearing or toxic-appearing.   HENT:      Head: Normocephalic.      Right Ear: External ear normal.      Left Ear: External ear normal.      Nose: Nose normal.      Mouth/Throat:      Pharynx: Oropharynx is clear.   Eyes:      Extraocular Movements: Extraocular movements intact.      Comments: Bright red spot remnant of subconjunctival hemorrhage in R eye   Cardiovascular:      Rate and  Rhythm: Regular rhythm. Bradycardia present.      Pulses: Normal pulses.   Pulmonary:      Effort: Pulmonary effort is normal. No respiratory distress.      Breath sounds: Normal breath sounds. No wheezing, rhonchi or rales.   Abdominal:      General: Abdomen is flat. There is no distension.      Palpations: Abdomen is soft.      Tenderness: There is no abdominal tenderness. There is no guarding or rebound.   Musculoskeletal:         General: Normal range of motion.      Cervical back: Normal range of motion.      Right lower leg: No edema.      Left lower leg: No edema.   Skin:     General: Skin is warm.      Coloration: Skin is not jaundiced.   Neurological:      General: No focal deficit present.      Mental Status: He is alert and oriented to person, place, and time.      Motor: No weakness.   Psychiatric:         Mood and Affect: Mood normal.         Behavior: Behavior normal.         Thought Content: Thought content normal.             Significant Labs: All pertinent labs within the past 24 hours have been reviewed.  CBC:   Recent Labs   Lab 01/27/24  1046 01/28/24  0318 01/29/24  0309   WBC 6.15 5.24 5.82   HGB 10.6* 8.8* 9.8*   HCT 30.5* 24.1* 28.3*   * 76* 76*     CMP:   Recent Labs   Lab 01/27/24  1046 01/27/24  1504 01/28/24  0318 01/28/24  1432 01/28/24  2242 01/29/24  0309     --  133* 135* 131* 132*   K 2.4*   < > 2.4* 2.8* 3.5 3.8     --  99 100 103 104   CO2 24  --  23 23 20* 20*     --  90 102 116* 97   BUN 21  --  25* 29* 32* 33*   CREATININE 3.2*  --  3.5* 3.8* 3.9* 3.9*   CALCIUM 8.6*  --  7.9* 7.9* 8.0* 8.0*   PROT 6.4  --  5.3*  --   --  5.4*   ALBUMIN 2.7*  --  2.2*  --   --  2.2*   BILITOT 0.4  --  0.7  --   --  1.5*   ALKPHOS 51*  --  51*  --   --  62   *  --  3,134*  --   --  3,928*   *  --  1,434*  --   --  1,928*   ANIONGAP 12  --  11 12 8 8    < > = values in this interval not displayed.       Significant Imaging: I have reviewed all pertinent  imaging results/findings within the past 24 hours.

## 2024-01-30 ENCOUNTER — TELEPHONE (OUTPATIENT)
Dept: INTERNAL MEDICINE | Facility: CLINIC | Age: 64
End: 2024-01-30
Payer: COMMERCIAL

## 2024-01-30 LAB
ALBUMIN SERPL BCP-MCNC: 2 G/DL (ref 3.5–5.2)
ALP SERPL-CCNC: 69 U/L (ref 55–135)
ALT SERPL W/O P-5'-P-CCNC: 1333 U/L (ref 10–44)
ANION GAP SERPL CALC-SCNC: 11 MMOL/L (ref 8–16)
AST SERPL-CCNC: 2162 U/L (ref 10–40)
BASOPHILS # BLD AUTO: 0.01 K/UL (ref 0–0.2)
BASOPHILS NFR BLD: 0.2 % (ref 0–1.9)
BILIRUB SERPL-MCNC: 1.5 MG/DL (ref 0.1–1)
BUN SERPL-MCNC: 43 MG/DL (ref 8–23)
CALCIUM SERPL-MCNC: 7.7 MG/DL (ref 8.7–10.5)
CHLORIDE SERPL-SCNC: 103 MMOL/L (ref 95–110)
CMV IGG SERPL QL IA: REACTIVE
CO2 SERPL-SCNC: 16 MMOL/L (ref 23–29)
CREAT SERPL-MCNC: 4.6 MG/DL (ref 0.5–1.4)
DIFFERENTIAL METHOD BLD: ABNORMAL
EOSINOPHIL # BLD AUTO: 0 K/UL (ref 0–0.5)
EOSINOPHIL NFR BLD: 0.2 % (ref 0–8)
EPSTEIN-BARR VIRUS DNA: ABNORMAL
EPSTEIN-BARR VIRUS PCR, QUANT: ABNORMAL IU/ML
ERYTHROCYTE [DISTWIDTH] IN BLOOD BY AUTOMATED COUNT: 12.5 % (ref 11.5–14.5)
EST. GFR  (NO RACE VARIABLE): 14 ML/MIN/1.73 M^2
GLUCOSE SERPL-MCNC: 94 MG/DL (ref 70–110)
HCT VFR BLD AUTO: 26.5 % (ref 40–54)
HGB BLD-MCNC: 9.1 G/DL (ref 14–18)
IMM GRANULOCYTES # BLD AUTO: 0.03 K/UL (ref 0–0.04)
IMM GRANULOCYTES NFR BLD AUTO: 0.6 % (ref 0–0.5)
INR PPP: 1.3 (ref 0.8–1.2)
LYMPHOCYTES # BLD AUTO: 0.4 K/UL (ref 1–4.8)
LYMPHOCYTES NFR BLD: 8.3 % (ref 18–48)
MAGNESIUM SERPL-MCNC: 2 MG/DL (ref 1.6–2.6)
MCH RBC QN AUTO: 29 PG (ref 27–31)
MCHC RBC AUTO-ENTMCNC: 34.3 G/DL (ref 32–36)
MCV RBC AUTO: 84 FL (ref 82–98)
MONOCYTES # BLD AUTO: 0.3 K/UL (ref 0.3–1)
MONOCYTES NFR BLD: 5.7 % (ref 4–15)
NEUTROPHILS # BLD AUTO: 4.5 K/UL (ref 1.8–7.7)
NEUTROPHILS NFR BLD: 85 % (ref 38–73)
NRBC BLD-RTO: 0 /100 WBC
PHOSPHATE SERPL-MCNC: 3.8 MG/DL (ref 2.7–4.5)
PLATELET # BLD AUTO: 68 K/UL (ref 150–450)
PMV BLD AUTO: 11.9 FL (ref 9.2–12.9)
POTASSIUM SERPL-SCNC: 4 MMOL/L (ref 3.5–5.1)
PROT SERPL-MCNC: 5 G/DL (ref 6–8.4)
PROTHROMBIN TIME: 14 SEC (ref 9–12.5)
RBC # BLD AUTO: 3.14 M/UL (ref 4.6–6.2)
SODIUM SERPL-SCNC: 130 MMOL/L (ref 136–145)
WBC # BLD AUTO: 5.29 K/UL (ref 3.9–12.7)

## 2024-01-30 PROCEDURE — 85025 COMPLETE CBC W/AUTO DIFF WBC: CPT

## 2024-01-30 PROCEDURE — 36415 COLL VENOUS BLD VENIPUNCTURE: CPT | Mod: XB

## 2024-01-30 PROCEDURE — 85610 PROTHROMBIN TIME: CPT

## 2024-01-30 PROCEDURE — 63600175 PHARM REV CODE 636 W HCPCS

## 2024-01-30 PROCEDURE — 11000001 HC ACUTE MED/SURG PRIVATE ROOM

## 2024-01-30 PROCEDURE — 84100 ASSAY OF PHOSPHORUS: CPT

## 2024-01-30 PROCEDURE — 36415 COLL VENOUS BLD VENIPUNCTURE: CPT

## 2024-01-30 PROCEDURE — 25000003 PHARM REV CODE 250

## 2024-01-30 PROCEDURE — 83735 ASSAY OF MAGNESIUM: CPT

## 2024-01-30 PROCEDURE — 80053 COMPREHEN METABOLIC PANEL: CPT

## 2024-01-30 RX ORDER — IPRATROPIUM BROMIDE AND ALBUTEROL SULFATE 2.5; .5 MG/3ML; MG/3ML
3 SOLUTION RESPIRATORY (INHALATION) EVERY 6 HOURS
Status: DISCONTINUED | OUTPATIENT
Start: 2024-01-30 | End: 2024-01-31

## 2024-01-30 RX ORDER — GUAIFENESIN 100 MG/5ML
200 SOLUTION ORAL EVERY 4 HOURS PRN
Status: DISCONTINUED | OUTPATIENT
Start: 2024-01-30 | End: 2024-02-09 | Stop reason: HOSPADM

## 2024-01-30 RX ORDER — IPRATROPIUM BROMIDE AND ALBUTEROL SULFATE 2.5; .5 MG/3ML; MG/3ML
3 SOLUTION RESPIRATORY (INHALATION) EVERY 6 HOURS
Status: DISCONTINUED | OUTPATIENT
Start: 2024-01-30 | End: 2024-01-30

## 2024-01-30 RX ADMIN — CARVEDILOL 25 MG: 25 TABLET, FILM COATED ORAL at 04:01

## 2024-01-30 RX ADMIN — GUAIFENESIN 200 MG: 200 SOLUTION ORAL at 12:01

## 2024-01-30 RX ADMIN — BENZONATATE 100 MG: 100 CAPSULE ORAL at 04:01

## 2024-01-30 RX ADMIN — BENZONATATE 100 MG: 100 CAPSULE ORAL at 08:01

## 2024-01-30 RX ADMIN — CEFTRIAXONE SODIUM 2 G: 2 INJECTION, POWDER, FOR SOLUTION INTRAMUSCULAR; INTRAVENOUS at 11:01

## 2024-01-30 RX ADMIN — LEVOTHYROXINE SODIUM 75 MCG: 75 TABLET ORAL at 05:01

## 2024-01-30 RX ADMIN — CARVEDILOL 25 MG: 25 TABLET, FILM COATED ORAL at 08:01

## 2024-01-30 RX ADMIN — ISOSORBIDE MONONITRATE 60 MG: 30 TABLET, EXTENDED RELEASE ORAL at 08:01

## 2024-01-30 RX ADMIN — GUAIFENESIN 200 MG: 200 SOLUTION ORAL at 08:01

## 2024-01-30 RX ADMIN — CLOPIDOGREL BISULFATE 75 MG: 75 TABLET ORAL at 08:01

## 2024-01-30 NOTE — ASSESSMENT & PLAN NOTE
Patient with baseline CKD (Cr 2.3-2.8) presents with BUN/Cr of 21/3.2, Estimated Creatinine Clearance: 24.7 mL/min (A) (based on SCr of 3.2 mg/dL (H)).    Plan:  - Urine Studies suggesting intrinsic etiology (FeNa 3.9%)  - Strict I&O  - Avoid nephrotoxins and renally dose meds  - Nephrology consulted, appreciate recommendations.

## 2024-01-30 NOTE — TELEPHONE ENCOUNTER
----- Message from Courtney Everett sent at 1/29/2024  4:44 PM CST -----  Regarding: HFU  Patient is being discharged from [facility] and is requiring a hospital follow up appointment with their Primary Care Provider in 7 days. Patient is established. I am unable to schedule an appointment in that time frame. Please schedule patient a sooner appointment and message me back so Discharge Nurse can advise patient prior to discharge.    DX: BRETT      Thank you, Nirali  Physician Referral Specialist/Discharge

## 2024-01-30 NOTE — PROGRESS NOTES
"[facility] Medicine  Progress Note    Patient Name: Domingo Gross  MRN: [id number]  Patient Class: IP- Inpatient   Admission Date: 1/27/2024  Length of Stay: 3 days  Attending Physician: Pawan Garcia,*  Primary Care Provider: Analy Encarnacion MD        Subjective:     Principal Problem:CAP (community acquired pneumonia)        HPI:  63-year-old man with PMHx of HTN, Atrial Fibrillation (sees Dr. Calderón, on Amiodarone, Apixaban), COPD (inhaler PRN), CKD (sees Dr. Payne), Hypothyroidism (Levothyroxine) presents via ambulance to [facility] on 1/27/24 for 1-day history of chest pain, cough, irregular breathing. History was collected from patient and wife, who is at bedside.    Patient reports experiencing chest pain, cough, and decreased appetite that started last night. Reports a fever of 102, did not take Tylenol. This morning, wife noticed that he was breathing "funny" and called the ambulance. Patient's boss recently tested positive for Flu/COVID. Patient emphasized that lack of food intake was due to decreased appetite and denied emesis and nausea.    Currently patient denies any chest pain or difficulty breathing. He reports taking potassium at home & being compliant with all medications. He is a former smoker and denies drinking alcohol.    In the ER, vitals were as follows - Temp 99.8 -> 102.3, HR 68, /87, O2 94%. Labs were significant for K 2.4, Magnesium 1.3, BUN/Cr 21/3.2 (baseline 2.8), AST//302, Alk phos 51 WBC 6.15, Procal 0.28. Patient was given 40mEq of potassium PO, 10mEq of KCl and admitted to [facility] for management/further workup of of Hypokalemia, BRETT, PNA, Transaminitis.    Overview/Hospital Course:  No notes on file    Interval History: No adverse events overnight. Patient LFTs improved with worsening BUN/Cr on AM Labs. Patient reports continuing to feel better.    Review of Systems   Constitutional:  Negative for appetite change, " chills and fever.   HENT:  Negative for rhinorrhea, sore throat and trouble swallowing.    Eyes:  Negative for pain and visual disturbance.   Respiratory:  Negative for cough and shortness of breath.    Cardiovascular:  Negative for chest pain, palpitations and leg swelling.   Gastrointestinal:  Negative for abdominal pain, constipation, diarrhea, nausea and vomiting.   Genitourinary:  Negative for dysuria and hematuria.   Musculoskeletal:  Negative for gait problem and neck stiffness.   Skin:  Negative for rash and wound.   Neurological:  Negative for tremors, weakness and headaches.   Psychiatric/Behavioral:  Negative for agitation and confusion.      Objective:     Vital Signs (Most Recent):  Temp: 98.1 °F (36.7 °C) (01/30/24 1628)  Pulse: 67 (01/30/24 1628)  Resp: 18 (01/30/24 1628)  BP: 139/64 (01/30/24 1628)  SpO2: (!) 94 % (01/30/24 1628) Vital Signs (24h Range):  Temp:  [97.6 °F (36.4 °C)-98.8 °F (37.1 °C)] 98.1 °F (36.7 °C)  Pulse:  [62-70] 67  Resp:  [18] 18  SpO2:  [94 %-97 %] 94 %  BP: (129-139)/(60-65) 139/64     Weight: 83.3 kg (183 lb 10.3 oz)  Body mass index is 26.35 kg/m².    Intake/Output Summary (Last 24 hours) at 1/30/2024 1720  Last data filed at 1/30/2024 0522  Gross per 24 hour   Intake 490 ml   Output 1100 ml   Net -610 ml         Physical Exam  Constitutional:       General: He is not in acute distress.     Appearance: Normal appearance. He is normal weight. He is not ill-appearing or toxic-appearing.   HENT:      Head: Normocephalic.      Right Ear: External ear normal.      Left Ear: External ear normal.      Nose: Nose normal.      Mouth/Throat:      Pharynx: Oropharynx is clear.   Eyes:      Extraocular Movements: Extraocular movements intact.   Cardiovascular:      Rate and Rhythm: Normal rate and regular rhythm.      Pulses: Normal pulses.   Pulmonary:      Effort: Pulmonary effort is normal. No respiratory distress.      Breath sounds: Normal breath sounds. No wheezing, rhonchi or  "rales.   Abdominal:      General: Abdomen is flat. There is no distension.      Palpations: Abdomen is soft.      Tenderness: There is no abdominal tenderness. There is no guarding or rebound.   Musculoskeletal:         General: Normal range of motion.      Cervical back: Normal range of motion.      Right lower leg: No edema.      Left lower leg: No edema.   Skin:     General: Skin is warm.      Coloration: Skin is not jaundiced.   Neurological:      General: No focal deficit present.      Mental Status: He is alert and oriented to person, place, and time.      Motor: No weakness.   Psychiatric:         Mood and Affect: Mood normal.         Behavior: Behavior normal.         Thought Content: Thought content normal.             Significant Labs: All pertinent labs within the past 24 hours have been reviewed.  CBC:   Recent Labs   Lab 01/29/24  0309 01/30/24  0230   WBC 5.82 5.29   HGB 9.8* 9.1*   HCT 28.3* 26.5*   PLT 76* 68*     CMP:   Recent Labs   Lab 01/29/24  0309 01/29/24  1137 01/30/24  0230   * 131* 130*   K 3.8 4.6 4.0    105 103   CO2 20* 18* 16*   GLU 97 113* 94   BUN 33* 35* 43*   CREATININE 3.9* 4.0* 4.6*   CALCIUM 8.0* 7.9* 7.7*   PROT 5.4*  --  5.0*   ALBUMIN 2.2*  --  2.0*   BILITOT 1.5*  --  1.5*   ALKPHOS 62  --  69   AST 3,928*  --  2,162*   ALT 1,928*  --  1,333*   ANIONGAP 8 8 11       Significant Imaging: I have reviewed all pertinent imaging results/findings within the past 24 hours.    Assessment/Plan:      * CAP (community acquired pneumonia)  Patient presented with "chest hurts", cough, decreased appetite. Temp 103 On RA, sating 94-96%. WBC 6.15, Lactic acid 1.2, Procal 0.28.  CXR: Increased lung opacities, concerning for interstitial edema and/or pneumonitis.  Short-term imaging follow-up could be performed to ensure resolution.    Plan:  Blood cultures pending (NGTD)  Continue Ceftriaxone + Doxycycline; broaden if necessary    Hypomagnesemia  RESOLVED    Plan:  Replete as " needed.    Acute liver failure without hepatic coma  Patient presents with AST/ALT of 481/302, (24/19 5 months ago). Denies drinking alcohol.    Plan:  Will hold PRN Tylenol  Abdominal ultrasound with no acute abnormalities  Hepatitis panel negative  Further labs pending    Hypothyroidism  Patient with reported TSH 5 months ago 13.669    Plan:  Repeat TSH elevated, T4 normal  Increased home levothyroxine    Persistent atrial fibrillation  Patient with documented diagnosis of Atrial Fibrillation per notes by Cardiologist Dmitriy Chavarria MD reports being compliant with the following home regimen: Apixaban 5mg BID, Amiodarone. EKG taken during admission reveals Aflutter/A Fib    Plan:  - [id number] Score: 1  - Anticoagulation indicated. Anticoagulation done with Apixaban .   - Will hold Amiodarone in setting of transaminitis.    BRETT (acute kidney injury)  Patient with baseline CKD (Cr 2.3-2.8) presents with BUN/Cr of 21/3.2, Estimated Creatinine Clearance: 24.7 mL/min (A) (based on SCr of 3.2 mg/dL (H)).    Plan:  - Urine Studies suggesting intrinsic etiology (FeNa 3.9%)  - Strict I&O  - Avoid nephrotoxins and renally dose meds  - Nephrology consulted, appreciate recommendations.    Hypokalemia  RESOLVED    Plan:  - Replete as needed    Hyperlipidemia  Patient with known history of HLD reports being compliant with the following home regimen: Rosuvastatin 40mg QD    Plan:  Will hold statin in setting of elevated transaminitis  Rosuvastatin not available in hospital; will use Atorvastatin equivalent of 80 mg QD when appropriate    HTN (hypertension)  Patient with known history of HTN reports being compliant with the following home regimen: Carvedilol 25mg BID, Eplerenone 50mg QD, Imdur 60mg QD. Patient's pressures on admission to the ER are (143-198)/(64-88) - elevated likely secondary to not taking medications before arriving to the hospital.    Plan:  - Continue home Carvedilol and Imdur  - Eplerenone not available in  hospital; in setting of his hypokalemia, will use Spironolactone if additional antihypertensives are required  - If pressures reach above 180 systolic or 100 diastolic, will use PRN medications      VTE Risk Mitigation (From admission, onward)           Ordered     IP VTE HIGH RISK PATIENT  Once         01/27/24 1240     Place sequential compression device  Until discontinued         01/27/24 1240                    Discharge Planning   FLACO:      Code Status: Full Code   Is the patient medically ready for discharge?:     Reason for patient still in hospital (select all that apply): Patient trending condition, Laboratory test, Treatment, and Consult recommendations  Discharge Plan A: Home, Home with family          ________________________  Jude Sanford MD  [facility] PGY-2

## 2024-01-30 NOTE — SUBJECTIVE & OBJECTIVE
Interval History: No adverse events overnight. Patient LFTs improved with worsening BUN/Cr on AM Labs. Patient reports continuing to feel better.    Review of Systems   Constitutional:  Negative for appetite change, chills and fever.   HENT:  Negative for rhinorrhea, sore throat and trouble swallowing.    Eyes:  Negative for pain and visual disturbance.   Respiratory:  Negative for cough and shortness of breath.    Cardiovascular:  Negative for chest pain, palpitations and leg swelling.   Gastrointestinal:  Negative for abdominal pain, constipation, diarrhea, nausea and vomiting.   Genitourinary:  Negative for dysuria and hematuria.   Musculoskeletal:  Negative for gait problem and neck stiffness.   Skin:  Negative for rash and wound.   Neurological:  Negative for tremors, weakness and headaches.   Psychiatric/Behavioral:  Negative for agitation and confusion.      Objective:     Vital Signs (Most Recent):  Temp: 98.1 °F (36.7 °C) (01/30/24 1628)  Pulse: 67 (01/30/24 1628)  Resp: 18 (01/30/24 1628)  BP: 139/64 (01/30/24 1628)  SpO2: (!) 94 % (01/30/24 1628) Vital Signs (24h Range):  Temp:  [97.6 °F (36.4 °C)-98.8 °F (37.1 °C)] 98.1 °F (36.7 °C)  Pulse:  [62-70] 67  Resp:  [18] 18  SpO2:  [94 %-97 %] 94 %  BP: (129-139)/(60-65) 139/64     Weight: 83.3 kg (183 lb 10.3 oz)  Body mass index is 26.35 kg/m².    Intake/Output Summary (Last 24 hours) at 1/30/2024 1720  Last data filed at 1/30/2024 0522  Gross per 24 hour   Intake 490 ml   Output 1100 ml   Net -610 ml         Physical Exam  Constitutional:       General: He is not in acute distress.     Appearance: Normal appearance. He is normal weight. He is not ill-appearing or toxic-appearing.   HENT:      Head: Normocephalic.      Right Ear: External ear normal.      Left Ear: External ear normal.      Nose: Nose normal.      Mouth/Throat:      Pharynx: Oropharynx is clear.   Eyes:      Extraocular Movements: Extraocular movements intact.   Cardiovascular:      Rate and  Rhythm: Normal rate and regular rhythm.      Pulses: Normal pulses.   Pulmonary:      Effort: Pulmonary effort is normal. No respiratory distress.      Breath sounds: Normal breath sounds. No wheezing, rhonchi or rales.   Abdominal:      General: Abdomen is flat. There is no distension.      Palpations: Abdomen is soft.      Tenderness: There is no abdominal tenderness. There is no guarding or rebound.   Musculoskeletal:         General: Normal range of motion.      Cervical back: Normal range of motion.      Right lower leg: No edema.      Left lower leg: No edema.   Skin:     General: Skin is warm.      Coloration: Skin is not jaundiced.   Neurological:      General: No focal deficit present.      Mental Status: He is alert and oriented to person, place, and time.      Motor: No weakness.   Psychiatric:         Mood and Affect: Mood normal.         Behavior: Behavior normal.         Thought Content: Thought content normal.             Significant Labs: All pertinent labs within the past 24 hours have been reviewed.  CBC:   Recent Labs   Lab 01/29/24  0309 01/30/24  0230   WBC 5.82 5.29   HGB 9.8* 9.1*   HCT 28.3* 26.5*   PLT 76* 68*     CMP:   Recent Labs   Lab 01/29/24  0309 01/29/24  1137 01/30/24  0230   * 131* 130*   K 3.8 4.6 4.0    105 103   CO2 20* 18* 16*   GLU 97 113* 94   BUN 33* 35* 43*   CREATININE 3.9* 4.0* 4.6*   CALCIUM 8.0* 7.9* 7.7*   PROT 5.4*  --  5.0*   ALBUMIN 2.2*  --  2.0*   BILITOT 1.5*  --  1.5*   ALKPHOS 62  --  69   AST 3,928*  --  2,162*   ALT 1,928*  --  1,333*   ANIONGAP 8 8 11       Significant Imaging: I have reviewed all pertinent imaging results/findings within the past 24 hours.

## 2024-01-30 NOTE — TELEPHONE ENCOUNTER
Patient stated he cannot set up appt because he has no idea when he's getting out the hospital as they are still running tests on him. Asked patient to contact us when he was discharged

## 2024-01-30 NOTE — ASSESSMENT & PLAN NOTE
Patient with documented diagnosis of Atrial Fibrillation per notes by Cardiologist Dmitriy Chavarria MD reports being compliant with the following home regimen: Apixaban 5mg BID, Amiodarone. EKG taken during admission reveals Aflutter/A Fib    Plan:  - [id number] Score: 1  - Anticoagulation indicated. Anticoagulation done with Apixaban .   - Will hold Amiodarone in setting of transaminitis.

## 2024-01-30 NOTE — CONSULTS
Nephrology Consult  Note     Consult Requested By: Pawan Garcia,*  Reason for Consult: BRETT on CKD4     SUBJECTIVE:      History of Present Illness:  Domingo Gross is a 63 y.o.   male who  has a past medical history of Allergy, Anemia, Anticoagulant long-term use, Arthritis, CKD (chronic kidney disease) stage 4, GFR 15-29 ml/min, COPD (chronic obstructive pulmonary disease) (08/20/2021), Coronary artery disease, Heart attack (10/04/2019), Hematuria, Hemothorax, Hyperlipidemia, Hypertension, Hyperuricemia, Hypocalcemia, Hypokalemia, Hypophosphatemia, Hypothyroidism (3/2/2023), PAD (peripheral artery disease), Proteinuria, and Vitamin D deficiency.. The patient presented to the [facility] on 1/27/2024 with a primary complaint of  cough SOB found to have PNA now on Iv abx also had LFTs elevated as well as Cr   ?    Review of Systems   Constitutional:  Negative for chills and fever.   HENT:  Negative for congestion and sore throat.    Eyes:  Negative for blurred vision, double vision and photophobia.   Respiratory:  Positive for cough. Negative for shortness of breath.    Cardiovascular:  Negative for chest pain, palpitations and leg swelling.   Gastrointestinal:  Negative for abdominal pain, diarrhea, nausea and vomiting.   Genitourinary:  Negative for dysuria and urgency.   Musculoskeletal:  Negative for joint pain and myalgias.   Skin:  Negative for itching and rash.   Neurological:  Positive for weakness. Negative for dizziness, sensory change and headaches.   Endo/Heme/Allergies:  Negative for polydipsia. Does not bruise/bleed easily.   Psychiatric/Behavioral:  Negative for depression.        Past Medical History:   Diagnosis Date    Allergy     Anemia     Anticoagulant long-term use     Arthritis     CKD (chronic kidney disease) stage 4, GFR 15-29 ml/min     COPD (chronic obstructive pulmonary disease) 08/20/2021    Coronary artery disease     Heart attack 10/04/2019    Hematuria     Hemothorax      Hyperlipidemia     Hypertension     Hyperuricemia     Hypocalcemia     Hypokalemia     Hypophosphatemia     Hypothyroidism 3/2/2023    PAD (peripheral artery disease)     Proteinuria     Vitamin D deficiency      Past Surgical History:   Procedure Laterality Date    ABDOMINAL AORTOGRAPHY N/A 5/10/2019    Procedure: AORTOGRAM-ABDOMINAL;  Surgeon: Keo Jenkins MD;  Location: [facility] CATH LAB/EP;  Service: Cardiology;  Laterality: N/A;  RSFA intervention     AORTOGRAPHY WITH SERIALOGRAPHY N/A 12/3/2021    Procedure: AORTOGRAM, WITH SERIALOGRAPHY;  Surgeon: Keo Jenkins MD;  Location: [facility] CATH LAB/EP;  Service: Cardiology;  Laterality: N/A;    AORTOGRAPHY WITH SERIALOGRAPHY N/A 4/1/2022    Procedure: AORTOGRAM, WITH SERIALOGRAPHY;  Surgeon: Keo Jenkins MD;  Location: [facility] CATH LAB/EP;  Service: Cardiology;  Laterality: N/A;    ATHERECTOMY, CORONARY N/A 4/25/2023    Procedure: Atherectomy-coronary;  Surgeon: Keo Jenkins MD;  Location: [facility] CATH LAB/EP;  Service: Cardiology;  Laterality: N/A;    BONE MARROW BIOPSY Right 10/12/2021    Procedure: BIOPSY-BONE MARROW;  Surgeon: Naseem Woods MD;  Location: [facility] OR;  Service: Oncology;  Laterality: Right;    CARDIAC CATHETERIZATION      CATARACT EXTRACTION      CATARACT EXTRACTION W/  INTRAOCULAR LENS IMPLANT Right 6/8/2020    Procedure: EXTRACTION, CATARACT, WITH IOL INSERTION;  Surgeon: Tin Light MD;  Location: [facility] OR;  Service: Ophthalmology;  Laterality: Right;    CATARACT EXTRACTION W/  INTRAOCULAR LENS IMPLANT Left 7/2/2020    Procedure: EXTRACTION, CATARACT, WITH IOL INSERTION;  Surgeon: Tin Light MD;  Location: [facility] OR;  Service: Ophthalmology;  Laterality: Left;    COLONOSCOPY N/A 1/6/2022    Procedure: COLONOSCOPY;  Surgeon: Kathe Penaloza MD;  Location: [facility] ENDO ([address] [place] [address]);  Service: Endoscopy;  Laterality: N/A;  COVID test at [facility] on 1/3-GT  okay to hold Plavix for 5 days and aspirin per Dr. Becerra  2nd floor due toextensive  cardiac history   instructions mailed and my [facility] portal -     CORONARY ANGIOGRAPHY N/A 3/29/2019    Procedure: ANGIOGRAM, CORONARY ARTERY;  Surgeon: Keo Jenkins MD;  Location: [facility] CATH LAB/EP;  Service: Cardiology;  Laterality: N/A;    CORONARY ANGIOGRAPHY Left 10/11/2019    Procedure: ANGIOGRAM, CORONARY ARTERY;  Surgeon: Keo Jenkins MD;  Location: [facility] CATH LAB/EP;  Service: Cardiology;  Laterality: Left;    CORONARY ANGIOGRAPHY N/A 4/10/2023    Procedure: ANGIOGRAM, CORONARY ARTERY;  Surgeon: Keo Jenkins MD;  Location: [facility] CATH LAB/EP;  Service: Cardiology;  Laterality: N/A;    CORONARY ANGIOPLASTY WITH STENT PLACEMENT  03/29/2019    mid and distal RCA    ESOPHAGOGASTRODUODENOSCOPY N/A 1/6/2022    Procedure: EGD (ESOPHAGOGASTRODUODENOSCOPY);  Surgeon: Kathe Penaloza MD;  Location: [address]);  Service: Endoscopy;  Laterality: N/A;    EYE SURGERY      INSTANTANEOUS WAVE-FREE RATIO (IFR) N/A 4/25/2023    Procedure: Instantaneous Wave-Free Ratio (IFR);  Surgeon: Keo Jenkins MD;  Location: [facility] CATH LAB/EP;  Service: Cardiology;  Laterality: N/A;    INTRAVASCULAR ULTRASOUND, NON-CORONARY  8/12/2022    Procedure: Intravascular Ultrasound, Non-Coronary;  Surgeon: Keo Jenkins MD;  Location: [facility] CATH LAB/EP;  Service: Cardiology;;    IVUS, CORONARY  4/25/2023    Procedure: IVUS, Coronary;  Surgeon: Keo Jenkins MD;  Location: [facility] CATH LAB/EP;  Service: Cardiology;;    IVUS, CORONARY  5/16/2023    Procedure: IVUS, Coronary;  Surgeon: Keo Jenkins MD;  Location: [facility] CATH LAB/EP;  Service: Cardiology;;  CX    LEFT HEART CATHETERIZATION N/A 3/29/2019    Procedure: Left heart cath;  Surgeon: Keo Jenkins MD;  Location: [facility] CATH LAB/EP;  Service: Cardiology;  Laterality: N/A;    LEFT HEART CATHETERIZATION Left 10/8/2019    Procedure: Left heart cath;  Surgeon: Keo Jenkins MD;  Location: [facility] CATH LAB/EP;  Service: Cardiology;  Laterality: Left;    LEFT HEART CATHETERIZATION  Left 4/10/2023    Procedure: Left heart cath;  Surgeon: Keo Jenkins MD;  Location: [facility] CATH LAB/EP;  Service: Cardiology;  Laterality: Left;    LEFT HEART CATHETERIZATION Left 4/25/2023    Procedure: Left heart cath;  Surgeon: Keo Jenkins MD;  Location: [facility] CATH LAB/EP;  Service: Cardiology;  Laterality: Left;    LEFT HEART CATHETERIZATION Left 5/16/2023    Procedure: Left heart cath;  Surgeon: Keo Jenkins MD;  Location: [facility] CATH LAB/EP;  Service: Cardiology;  Laterality: Left;    PERCUTANEOUS TRANSLUMINAL ANGIOPLASTY (PTA) OF PERIPHERAL VESSEL N/A 7/12/2019    Procedure: PTA, PERIPHERAL VESSEL;  Surgeon: Keo Jenkins MD;  Location: [facility] CATH LAB/EP;  Service: Cardiology;  Laterality: N/A;    PERCUTANEOUS TRANSLUMINAL ANGIOPLASTY (PTA) OF PERIPHERAL VESSEL Left 5/20/2022    Procedure: PTA, PERIPHERAL VESSEL;  Surgeon: Keo Jenkins MD;  Location: [facility] CATH LAB/EP;  Service: Cardiology;  Laterality: Left;    PERCUTANEOUS TRANSLUMINAL ANGIOPLASTY (PTA) OF PERIPHERAL VESSEL Right 8/12/2022    Procedure: PTA, PERIPHERAL VESSEL;  Surgeon: Keo Jenkins MD;  Location: [facility] CATH LAB/EP;  Service: Cardiology;  Laterality: Right;    PERCUTANEOUS TRANSLUMINAL BALLOON ANGIOPLASTY OF CORONARY ARTERY  4/25/2023    Procedure: Angioplasty-coronary;  Surgeon: Keo Jenkins MD;  Location: [facility] CATH LAB/EP;  Service: Cardiology;;    PTCA, SINGLE VESSEL  5/16/2023    Procedure: PTCA, Single Vessel;  Surgeon: Keo Jenkins MD;  Location: [facility] CATH LAB/EP;  Service: Cardiology;;  CX    STENT, DRUG ELUTING, SINGLE VESSEL, CORONARY  4/25/2023    Procedure: Stent, Drug Eluting, Single Vessel, Coronary;  Surgeon: Keo Jenkins MD;  Location: [facility] CATH LAB/EP;  Service: Cardiology;;    STENT, DRUG ELUTING, SINGLE VESSEL, CORONARY  5/16/2023    Procedure: Stent, Drug Eluting, Single Vessel, Coronary;  Surgeon: Keo Jenkins MD;  Location: [facility] CATH LAB/EP;  Service: Cardiology;;  CX    TRANSESOPHAGEAL  ECHOCARDIOGRAM WITH POSSIBLE CARDIOVERSION (JOSE W/ POSS CARDIOVERSION) N/A 2023    Procedure: Transesophageal echo (JOSE) intra-procedure log documentation;  Surgeon: Keo Jenkins MD;  Location: [facility] CATH LAB/EP;  Service: Cardiology;  Laterality: N/A;     Family History   Problem Relation Age of Onset    Aneurysm Mother     Hypertension Mother     Heart disease Mother     Cancer Father     Diabetes Sister     Hypertension Sister     No Known Problems Brother     No Known Problems Son      Social History     Tobacco Use    Smoking status: Former     Current packs/day: 0.00     Types: Cigarettes     Quit date: 1999     Years since quittin.7    Smokeless tobacco: Never   Substance Use Topics    Alcohol use: No     Alcohol/week: 0.0 standard drinks of alcohol    Drug use: No       Review of patient's allergies indicates:   Allergen Reactions    Ace inhibitors Rash            OBJECTIVE:     Vital Signs (Most Recent)  Vitals:    24 0513 24 0757 24 1155 24 1209   BP:  135/65 137/65    BP Location:  Right arm Right arm    Patient Position:  Lying Lying    Pulse: 62 64 64 64   Resp:  18 18    Temp:  97.6 °F (36.4 °C) 98 °F (36.7 °C)    TempSrc:  Oral Oral    SpO2:  97% 97%    Weight: 83.3 kg (183 lb 10.3 oz)      Height:                       Medications:   carvediloL  25 mg Oral BID WM    cefTRIAXone (Rocephin) IV (PEDS and ADULTS)  2 g Intravenous Q12H    clopidogreL  75 mg Oral Daily    isosorbide mononitrate  60 mg Oral Daily    levothyroxine  75 mcg Oral Before breakfast           Physical Exam  Vitals and nursing note reviewed.   Constitutional:       General: He is not in acute distress.     Appearance: He is not diaphoretic.   HENT:      Head: Normocephalic and atraumatic.      Mouth/Throat:      Pharynx: No oropharyngeal exudate.   Eyes:      General: No scleral icterus.     Conjunctiva/sclera: Conjunctivae normal.      Pupils: Pupils are equal, round, and reactive to light.    Cardiovascular:      Rate and Rhythm: Normal rate and regular rhythm.      Heart sounds: Normal heart sounds. No murmur heard.  Pulmonary:      Effort: Pulmonary effort is normal. No respiratory distress.      Breath sounds: Rales present.   Abdominal:      General: Bowel sounds are normal. There is no distension.      Palpations: Abdomen is soft.      Tenderness: There is no abdominal tenderness.   Musculoskeletal:         General: Normal range of motion.      Cervical back: Normal range of motion and neck supple.      Right lower leg: No edema.      Left lower leg: No edema.   Skin:     General: Skin is warm and dry.      Findings: No erythema.   Neurological:      Mental Status: He is alert and oriented to person, place, and time.      Cranial Nerves: No cranial nerve deficit.   Psychiatric:         Mood and Affect: Affect normal.         Cognition and Memory: Memory normal.         Judgment: Judgment normal.         Laboratory:  Recent Labs   Lab 01/28/24 0318 01/29/24  0309 01/30/24  0230   WBC 5.24 5.82 5.29   HGB 8.8* 9.8* 9.1*   HCT 24.1* 28.3* 26.5*   PLT 76* 76* 68*   MONO 3.4*  0.2* 2.6*  0.2* 5.7  0.3   EOSINOPHIL 0.2 0.2 0.2       Recent Labs   Lab 01/28/24  0318 01/28/24  1432 01/29/24  0309 01/29/24  1137 01/30/24  0230   *   < > 132* 131* 130*   K 2.4*   < > 3.8 4.6 4.0   CL 99   < > 104 105 103   CO2 23   < > 20* 18* 16*   BUN 25*   < > 33* 35* 43*   CREATININE 3.5*   < > 3.9* 4.0* 4.6*   CALCIUM 7.9*   < > 8.0* 7.9* 7.7*   PHOS 2.7  --  1.3*  --  3.8    < > = values in this interval not displayed.         Diagnostic Results:  X-Ray: Reviewed  US: Reviewed  Echo: Reviewed  ASSESSMENT/PLAN:     CKD4 - Cr baseline 2.8   Proteinuria - SPEP UPEP repeat  no monoclonals    -- mainly Alb   -- TOTOIE + only other immune work up negative  C3/C4 negative No Hep C or B, HIV.   -- Following with Dr Loly Payne in clinic for his CKD4  -- Renally dose all meds for now   -- ? Hypotension episode at home  LFTs improving Cr up 4.6 good UOP   Supportive therapy for now no indication for RRT at this time     2. HTN   controlled   3. Anemia of chronic kidney disease    Recent Labs   Lab 01/28/24  0318 01/29/24  0309 01/30/24  0230   HGB 8.8* 9.8* 9.1*   HCT 24.1* 28.3* 26.5*   PLT 76* 76* 68*         Iron   Lab Results   Component Value Date    IRON 60 03/14/2023    TIBC 299 03/14/2023    FERRITIN 191 03/14/2023       4. 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 A-fib -  per cardiology   6. Nutrition/Hypoalbuminemia    Recent Labs   Lab 01/28/24  0655 01/29/24  0309 01/30/24  0230 01/30/24  1131   LABPROT 17.2*  --   --  14.0*   ALBUMIN  --  2.2* 2.0*  --        Nepro with meals TID.       Thank you for the consult, will follow  With any question please call [phone]  Nena Arriola MD    [facility]    ESPERANZA Payne MD,   MD BRANDY Arredondo MD E. V. Harmon, NP    [address]  [place], [address] [phone]

## 2024-01-31 PROBLEM — E87.1 HYPONATREMIA: Status: ACTIVE | Noted: 2024-01-31

## 2024-01-31 PROBLEM — E87.6 HYPOKALEMIA: Status: RESOLVED | Noted: 2019-10-04 | Resolved: 2024-01-31

## 2024-01-31 PROBLEM — E83.42 HYPOMAGNESEMIA: Status: RESOLVED | Noted: 2024-01-27 | Resolved: 2024-01-31

## 2024-01-31 PROBLEM — B27.90 EBV INFECTION: Status: ACTIVE | Noted: 2024-01-31

## 2024-01-31 PROBLEM — B25.9 CMV (CYTOMEGALOVIRUS INFECTION): Status: ACTIVE | Noted: 2024-01-31

## 2024-01-31 LAB
ALBUMIN SERPL BCP-MCNC: 1.9 G/DL (ref 3.5–5.2)
ALP SERPL-CCNC: 73 U/L (ref 55–135)
ALT SERPL W/O P-5'-P-CCNC: 926 U/L (ref 10–44)
ANION GAP SERPL CALC-SCNC: 13 MMOL/L (ref 8–16)
AST SERPL-CCNC: 1246 U/L (ref 10–40)
BASOPHILS # BLD AUTO: 0.01 K/UL (ref 0–0.2)
BASOPHILS # BLD AUTO: 0.01 K/UL (ref 0–0.2)
BASOPHILS NFR BLD: 0.2 % (ref 0–1.9)
BASOPHILS NFR BLD: 0.2 % (ref 0–1.9)
BILIRUB SERPL-MCNC: 1 MG/DL (ref 0.1–1)
BUN SERPL-MCNC: 64 MG/DL (ref 8–23)
CALCIUM SERPL-MCNC: 7.8 MG/DL (ref 8.7–10.5)
CHLORIDE SERPL-SCNC: 101 MMOL/L (ref 95–110)
CK SERPL-CCNC: 3374 U/L (ref 20–200)
CMV IGM SERPL IA-ACNC: <8 AU/ML
CO2 SERPL-SCNC: 14 MMOL/L (ref 23–29)
CREAT SERPL-MCNC: 6.4 MG/DL (ref 0.5–1.4)
DIFFERENTIAL METHOD BLD: ABNORMAL
DIFFERENTIAL METHOD BLD: ABNORMAL
EBV EA IGG SER-ACNC: <5 U/ML
EBV NA IGG SER-ACNC: >600 U/ML
EBV VCA IGG SER-ACNC: 173 U/ML
EBV VCA IGM SER-ACNC: <10 U/ML
EOSINOPHIL # BLD AUTO: 0.1 K/UL (ref 0–0.5)
EOSINOPHIL # BLD AUTO: 0.1 K/UL (ref 0–0.5)
EOSINOPHIL NFR BLD: 1.1 % (ref 0–8)
EOSINOPHIL NFR BLD: 1.3 % (ref 0–8)
ERYTHROCYTE [DISTWIDTH] IN BLOOD BY AUTOMATED COUNT: 12.3 % (ref 11.5–14.5)
ERYTHROCYTE [DISTWIDTH] IN BLOOD BY AUTOMATED COUNT: 12.4 % (ref 11.5–14.5)
EST. GFR  (NO RACE VARIABLE): 9 ML/MIN/1.73 M^2
FIO2: 21 %
GLUCOSE SERPL-MCNC: 91 MG/DL (ref 70–110)
HCT VFR BLD AUTO: 25.5 % (ref 40–54)
HCT VFR BLD AUTO: 25.7 % (ref 40–54)
HGB BLD-MCNC: 8.9 G/DL (ref 14–18)
HGB BLD-MCNC: 9 G/DL (ref 14–18)
HSV-1 DNA BY PCR: NEGATIVE
HSV-2 DNA BY PCR: NEGATIVE
IMM GRANULOCYTES # BLD AUTO: 0.03 K/UL (ref 0–0.04)
IMM GRANULOCYTES # BLD AUTO: 0.03 K/UL (ref 0–0.04)
IMM GRANULOCYTES NFR BLD AUTO: 0.6 % (ref 0–0.5)
IMM GRANULOCYTES NFR BLD AUTO: 0.6 % (ref 0–0.5)
LKM AB SER-ACNC: 1.2 UNITS
LYMPHOCYTES # BLD AUTO: 0.4 K/UL (ref 1–4.8)
LYMPHOCYTES # BLD AUTO: 0.4 K/UL (ref 1–4.8)
LYMPHOCYTES NFR BLD: 7.3 % (ref 18–48)
LYMPHOCYTES NFR BLD: 7.4 % (ref 18–48)
MAGNESIUM SERPL-MCNC: 2 MG/DL (ref 1.6–2.6)
MCH RBC QN AUTO: 29.1 PG (ref 27–31)
MCH RBC QN AUTO: 29.5 PG (ref 27–31)
MCHC RBC AUTO-ENTMCNC: 34.6 G/DL (ref 32–36)
MCHC RBC AUTO-ENTMCNC: 35.3 G/DL (ref 32–36)
MCV RBC AUTO: 84 FL (ref 82–98)
MCV RBC AUTO: 84 FL (ref 82–98)
MONOCYTES # BLD AUTO: 0.3 K/UL (ref 0.3–1)
MONOCYTES # BLD AUTO: 0.3 K/UL (ref 0.3–1)
MONOCYTES NFR BLD: 5.9 % (ref 4–15)
MONOCYTES NFR BLD: 5.9 % (ref 4–15)
NEUTROPHILS # BLD AUTO: 4 K/UL (ref 1.8–7.7)
NEUTROPHILS # BLD AUTO: 4.6 K/UL (ref 1.8–7.7)
NEUTROPHILS NFR BLD: 84.6 % (ref 38–73)
NEUTROPHILS NFR BLD: 84.9 % (ref 38–73)
NRBC BLD-RTO: 0 /100 WBC
NRBC BLD-RTO: 0 /100 WBC
OSMOLALITY SERPL: 289 MOSM/KG (ref 280–300)
OSMOLALITY UR: 256 MOSM/KG (ref 50–1200)
PATH REV BLD -IMP: NORMAL
PCO2 BLDA: 39.9 MMHG (ref 35–45)
PH SMN: 7.24 [PH] (ref 7.35–7.45)
PHOSPHATE SERPL-MCNC: 6.4 MG/DL (ref 2.7–4.5)
PLATELET # BLD AUTO: 58 K/UL (ref 150–450)
PLATELET # BLD AUTO: 67 K/UL (ref 150–450)
PMV BLD AUTO: 12 FL (ref 9.2–12.9)
PMV BLD AUTO: 12 FL (ref 9.2–12.9)
PO2 BLDA: 40.8 MMHG (ref 40–60)
POC BASE DEFICIT: -9.9 MMOL/L (ref -2–2)
POC HCO3: 16.9 MMOL/L (ref 24–28)
POC PERFORMED BY: ABNORMAL
POC SATURATED O2: 67 % (ref 95–100)
POTASSIUM SERPL-SCNC: 3.8 MMOL/L (ref 3.5–5.1)
PROT SERPL-MCNC: 5.1 G/DL (ref 6–8.4)
RBC # BLD AUTO: 3.05 M/UL (ref 4.6–6.2)
RBC # BLD AUTO: 3.06 M/UL (ref 4.6–6.2)
SODIUM SERPL-SCNC: 128 MMOL/L (ref 136–145)
SPECIMEN SOURCE: ABNORMAL
URATE SERPL-MCNC: 6 MG/DL (ref 3.4–7)
WBC # BLD AUTO: 4.75 K/UL (ref 3.9–12.7)
WBC # BLD AUTO: 5.45 K/UL (ref 3.9–12.7)

## 2024-01-31 PROCEDURE — 36415 COLL VENOUS BLD VENIPUNCTURE: CPT | Mod: XB | Performed by: STUDENT IN AN ORGANIZED HEALTH CARE EDUCATION/TRAINING PROGRAM

## 2024-01-31 PROCEDURE — 97161 PT EVAL LOW COMPLEX 20 MIN: CPT

## 2024-01-31 PROCEDURE — 99222 1ST HOSP IP/OBS MODERATE 55: CPT | Mod: ,,, | Performed by: INTERNAL MEDICINE

## 2024-01-31 PROCEDURE — 82550 ASSAY OF CK (CPK): CPT | Performed by: STUDENT IN AN ORGANIZED HEALTH CARE EDUCATION/TRAINING PROGRAM

## 2024-01-31 PROCEDURE — 63600175 PHARM REV CODE 636 W HCPCS

## 2024-01-31 PROCEDURE — 83935 ASSAY OF URINE OSMOLALITY: CPT | Performed by: STUDENT IN AN ORGANIZED HEALTH CARE EDUCATION/TRAINING PROGRAM

## 2024-01-31 PROCEDURE — 85060 BLOOD SMEAR INTERPRETATION: CPT | Mod: ,,, | Performed by: PATHOLOGY

## 2024-01-31 PROCEDURE — 94761 N-INVAS EAR/PLS OXIMETRY MLT: CPT

## 2024-01-31 PROCEDURE — 85025 COMPLETE CBC W/AUTO DIFF WBC: CPT

## 2024-01-31 PROCEDURE — 25000242 PHARM REV CODE 250 ALT 637 W/ HCPCS

## 2024-01-31 PROCEDURE — 84550 ASSAY OF BLOOD/URIC ACID: CPT | Performed by: STUDENT IN AN ORGANIZED HEALTH CARE EDUCATION/TRAINING PROGRAM

## 2024-01-31 PROCEDURE — 36415 COLL VENOUS BLD VENIPUNCTURE: CPT

## 2024-01-31 PROCEDURE — 97165 OT EVAL LOW COMPLEX 30 MIN: CPT

## 2024-01-31 PROCEDURE — 25000003 PHARM REV CODE 250

## 2024-01-31 PROCEDURE — 97116 GAIT TRAINING THERAPY: CPT

## 2024-01-31 PROCEDURE — 84100 ASSAY OF PHOSPHORUS: CPT

## 2024-01-31 PROCEDURE — 36415 COLL VENOUS BLD VENIPUNCTURE: CPT | Mod: XB

## 2024-01-31 PROCEDURE — 94640 AIRWAY INHALATION TREATMENT: CPT

## 2024-01-31 PROCEDURE — 83930 ASSAY OF BLOOD OSMOLALITY: CPT | Performed by: STUDENT IN AN ORGANIZED HEALTH CARE EDUCATION/TRAINING PROGRAM

## 2024-01-31 PROCEDURE — 80053 COMPREHEN METABOLIC PANEL: CPT

## 2024-01-31 PROCEDURE — 83735 ASSAY OF MAGNESIUM: CPT

## 2024-01-31 PROCEDURE — 11000001 HC ACUTE MED/SURG PRIVATE ROOM

## 2024-01-31 PROCEDURE — 25000242 PHARM REV CODE 250 ALT 637 W/ HCPCS: Performed by: HOSPITALIST

## 2024-01-31 PROCEDURE — 85025 COMPLETE CBC W/AUTO DIFF WBC: CPT | Mod: 91

## 2024-01-31 PROCEDURE — 99223 1ST HOSP IP/OBS HIGH 75: CPT | Mod: NSCH,,, | Performed by: PHYSICIAN ASSISTANT

## 2024-01-31 PROCEDURE — 25000003 PHARM REV CODE 250: Performed by: STUDENT IN AN ORGANIZED HEALTH CARE EDUCATION/TRAINING PROGRAM

## 2024-01-31 RX ORDER — SODIUM BICARBONATE 650 MG/1
650 TABLET ORAL 3 TIMES DAILY
Status: DISCONTINUED | OUTPATIENT
Start: 2024-01-31 | End: 2024-02-02

## 2024-01-31 RX ORDER — IPRATROPIUM BROMIDE AND ALBUTEROL SULFATE 2.5; .5 MG/3ML; MG/3ML
3 SOLUTION RESPIRATORY (INHALATION) EVERY 4 HOURS
Status: DISCONTINUED | OUTPATIENT
Start: 2024-01-31 | End: 2024-02-09 | Stop reason: HOSPADM

## 2024-01-31 RX ADMIN — IPRATROPIUM BROMIDE AND ALBUTEROL SULFATE 3 ML: .5; 3 SOLUTION RESPIRATORY (INHALATION) at 11:01

## 2024-01-31 RX ADMIN — CARVEDILOL 25 MG: 25 TABLET, FILM COATED ORAL at 09:01

## 2024-01-31 RX ADMIN — SODIUM BICARBONATE 650 MG TABLET 650 MG: at 08:01

## 2024-01-31 RX ADMIN — LEVOTHYROXINE SODIUM 75 MCG: 75 TABLET ORAL at 05:01

## 2024-01-31 RX ADMIN — IPRATROPIUM BROMIDE AND ALBUTEROL SULFATE 3 ML: .5; 3 SOLUTION RESPIRATORY (INHALATION) at 07:01

## 2024-01-31 RX ADMIN — Medication 9 MG: at 10:01

## 2024-01-31 RX ADMIN — IPRATROPIUM BROMIDE AND ALBUTEROL SULFATE 3 ML: .5; 3 SOLUTION RESPIRATORY (INHALATION) at 03:01

## 2024-01-31 RX ADMIN — AZITHROMYCIN MONOHYDRATE 500 MG: 500 INJECTION, POWDER, LYOPHILIZED, FOR SOLUTION INTRAVENOUS at 09:01

## 2024-01-31 RX ADMIN — CLOPIDOGREL BISULFATE 75 MG: 75 TABLET ORAL at 09:01

## 2024-01-31 RX ADMIN — SODIUM BICARBONATE 650 MG TABLET 650 MG: at 03:01

## 2024-01-31 RX ADMIN — ISOSORBIDE MONONITRATE 60 MG: 30 TABLET, EXTENDED RELEASE ORAL at 09:01

## 2024-01-31 RX ADMIN — CARVEDILOL 25 MG: 25 TABLET, FILM COATED ORAL at 04:01

## 2024-01-31 RX ADMIN — CEFTRIAXONE SODIUM 2 G: 2 INJECTION, POWDER, FOR SOLUTION INTRAMUSCULAR; INTRAVENOUS at 12:01

## 2024-01-31 RX ADMIN — SODIUM BICARBONATE 650 MG TABLET 650 MG: at 10:01

## 2024-01-31 NOTE — ASSESSMENT & PLAN NOTE
Baseline CKD    Plan:  - Urine Studies suggesting intrinsic etiology (FeNa 3.9%)  - Strict I&O  - Avoid nephrotoxins and renally dose meds  - Nephrology consulted, appreciate recommendations.  - Plan for renal biopsy.  - IR consulted, appreciate recommendations.   - Remaining plan as in hyponatremia

## 2024-01-31 NOTE — PROGRESS NOTES
"[facility] Medicine  Progress Note    Patient Name: Domingo Gross  MRN: [id number]  Patient Class: IP- Inpatient   Admission Date: 1/27/2024  Length of Stay: 4 days  Attending Physician: Pawan Garcia,*  Primary Care Provider: Analy Encarnacion MD        Subjective:     Principal Problem:CAP (community acquired pneumonia)        HPI:  63-year-old man with PMHx of HTN, Atrial Fibrillation (sees Dr. Calderón, on Amiodarone, Apixaban), COPD (inhaler PRN), CKD (sees Dr. Payne), Hypothyroidism (Levothyroxine) presents via ambulance to [facility] on 1/27/24 for 1-day history of chest pain, cough, irregular breathing. History was collected from patient and wife, who is at bedside.    Patient reports experiencing chest pain, cough, and decreased appetite that started last night. Reports a fever of 102, did not take Tylenol. This morning, wife noticed that he was breathing "funny" and called the ambulance. Patient's boss recently tested positive for Flu/COVID. Patient emphasized that lack of food intake was due to decreased appetite and denied emesis and nausea.    Currently patient denies any chest pain or difficulty breathing. He reports taking potassium at home & being compliant with all medications. He is a former smoker and denies drinking alcohol.    In the ER, vitals were as follows - Temp 99.8 -> 102.3, HR 68, /87, O2 94%. Labs were significant for K 2.4, Magnesium 1.3, BUN/Cr 21/3.2 (baseline 2.8), AST//302, Alk phos 51 WBC 6.15, Procal 0.28. Patient was given 40mEq of potassium PO, 10mEq of KCl and admitted to [facility] for management/further workup of of Hypokalemia, BRETT, PNA, Transaminitis.    Overview/Hospital Course:  No notes on file    Interval History: No adverse events overnight. EBV, CMV results returned. LFTs continue to improve. Na, BUN/Cr continue to worse.    Review of Systems   Constitutional:  Negative for appetite change, chills and fever. "   HENT:  Negative for rhinorrhea, sore throat and trouble swallowing.    Eyes:  Negative for pain and visual disturbance.   Respiratory:  Negative for cough and shortness of breath.    Cardiovascular:  Negative for chest pain, palpitations and leg swelling.   Gastrointestinal:  Negative for abdominal pain, constipation, diarrhea, nausea and vomiting.   Genitourinary:  Negative for dysuria and hematuria.   Musculoskeletal:  Negative for gait problem and neck stiffness.   Skin:  Negative for rash and wound.   Neurological:  Negative for tremors, weakness and headaches.   Psychiatric/Behavioral:  Negative for agitation and confusion.      Objective:     Vital Signs (Most Recent):  Temp: 98 °F (36.7 °C) (01/31/24 0738)  Pulse: (!) 55 (01/31/24 1214)  Resp: (!) 23 (01/31/24 1135)  BP: (!) 145/64 (01/31/24 0738)  SpO2: 95 % (01/31/24 1135) Vital Signs (24h Range):  Temp:  [97.6 °F (36.4 °C)-98.3 °F (36.8 °C)] 98 °F (36.7 °C)  Pulse:  [52-67] 55  Resp:  [18-23] 23  SpO2:  [93 %-96 %] 95 %  BP: (130-147)/(61-69) 145/64     Weight: 83.3 kg (183 lb 10.3 oz)  Body mass index is 26.35 kg/m².    Intake/Output Summary (Last 24 hours) at 1/31/2024 1418  Last data filed at 1/31/2024 0552  Gross per 24 hour   Intake 400 ml   Output 400 ml   Net 0 ml         Physical Exam  Vitals and nursing note reviewed.   Constitutional:       General: He is not in acute distress.     Appearance: Normal appearance. He is normal weight. He is not ill-appearing or toxic-appearing.   HENT:      Head: Normocephalic.      Right Ear: External ear normal.      Left Ear: External ear normal.      Nose: Nose normal.      Mouth/Throat:      Pharynx: Oropharynx is clear.   Eyes:      Extraocular Movements: Extraocular movements intact.   Cardiovascular:      Rate and Rhythm: Normal rate and regular rhythm.      Pulses: Normal pulses.   Pulmonary:      Effort: Pulmonary effort is normal. No respiratory distress.      Breath sounds: Wheezing present. No  rhonchi or rales.   Abdominal:      General: Abdomen is flat. There is no distension.      Palpations: Abdomen is soft.      Tenderness: There is no abdominal tenderness. There is no guarding or rebound.   Musculoskeletal:         General: Normal range of motion.      Cervical back: Normal range of motion.      Right lower leg: No edema.      Left lower leg: No edema.   Skin:     General: Skin is warm.      Coloration: Skin is not jaundiced.   Neurological:      General: No focal deficit present.      Mental Status: He is alert and oriented to person, place, and time.      Motor: No weakness.   Psychiatric:         Mood and Affect: Mood normal.         Behavior: Behavior normal.         Thought Content: Thought content normal.             Significant Labs: All pertinent labs within the past 24 hours have been reviewed.  ABGs:   Recent Labs   Lab 01/31/24  1129   PH 7.236*   PCO2 39.9   HCO3 16.9*   POCSATURATED 67.0*   PO2 40.8     CBC:   Recent Labs   Lab 01/30/24  0230 01/31/24  0318 01/31/24  1126   WBC 5.29 5.45 4.75   HGB 9.1* 8.9* 9.0*   HCT 26.5* 25.7* 25.5*   PLT 68* 67* 58*     CMP:   Recent Labs   Lab 01/30/24  0230 01/31/24  0318   * 128*   K 4.0 3.8    101   CO2 16* 14*   GLU 94 91   BUN 43* 64*   CREATININE 4.6* 6.4*   CALCIUM 7.7* 7.8*   PROT 5.0* 5.1*   ALBUMIN 2.0* 1.9*   BILITOT 1.5* 1.0   ALKPHOS 69 73   AST 2,162* 1,246*   ALT 1,333* 926*   ANIONGAP 11 13       Significant Imaging: I have reviewed all pertinent imaging results/findings within the past 24 hours.    EXAMINATION:  XR CHEST AP PORTABLE     CLINICAL HISTORY:  Respiratory status;     TECHNIQUE:  Single frontal view of the chest was performed.     COMPARISON:  01/27/2024     FINDINGS:  Cardiomediastinal contour is within normal limits.Diffuse interstitial prominence, similar to slightly improved from the prior exam.  No pneumothorax.Possible small right pleural effusion, unchanged.     Impression:     Similar to slightly  "improved lung aeration.        Electronically signed by: Andrews Paz MD  Date:                                            01/31/2024  Time:                                           09:17      Assessment/Plan:      * CAP (community acquired pneumonia)  Patient presented with "chest hurts", cough, decreased appetite. Temp 103 On RA, sating 94-96%. WBC 6.15, Lactic acid 1.2, Procal 0.28.  CXR: Increased lung opacities, concerning for interstitial edema and/or pneumonitis.  Short-term imaging follow-up could be performed to ensure resolution.    Plan:  Blood cultures pending (NGTD)  Continue Ceftriaxone + Azithromycin; broaden if necessary    Hyponatremia  Plan:  - Nephrology consulted, appreciate recommendations.  - Fluid restriction.  - Urine and serum osm pending.    CMV (cytomegalovirus infection)  Plan:  - Quantitative CMV DNA to assess for acute infection pending.    EBV infection  EBV IgM and DNA are negative  these serologies likely represent previous infection    Plan:  - ID consulted, appreciate recommendations.      Acute liver failure without hepatic coma  Patient presents with AST/ALT of 481/302, (24/19 5 months ago). Denies drinking alcohol.    Plan:  Will hold PRN Tylenol  Abdominal ultrasound with no acute abnormalities  Hepatitis panel negative    Hypothyroidism  Patient with reported TSH 5 months ago 13.669    Plan:  Repeat TSH elevated, T4 normal  Increased home levothyroxine    Persistent atrial fibrillation  Patient with documented diagnosis of Atrial Fibrillation per notes by Cardiologist Dmitriy Chavraria MD reports being compliant with the following home regimen: Apixaban 5mg BID, Amiodarone. EKG taken during admission reveals Aflutter/A Fib    Plan:  - [id number] Score: 1  - Will hold Amiodarone in setting of transaminitis.    BRETT (acute kidney injury)  Baseline CKD    Plan:  - Urine Studies suggesting intrinsic etiology (FeNa 3.9%)  - Strict I&O  - Avoid nephrotoxins and renally dose meds  - " Nephrology consulted, appreciate recommendations.  - Plan for renal biopsy.  - IR consulted, appreciate recommendations.   - Remaining plan as in hyponatremia    Hyperlipidemia  Patient with known history of HLD reports being compliant with the following home regimen: Rosuvastatin 40mg QD    Plan:   - Hold statin in setting of elevated transaminitis  - Continue home equivalent Atorvastatin 80 mg QD when appropriate    HTN (hypertension)  Patient with known history of HTN reports being compliant with the following home regimen: Carvedilol 25mg BID, Eplerenone 50mg QD, Imdur 60mg QD. Patient's pressures on admission to the ER are (143-198)/(64-88) - elevated likely secondary to not taking medications before arriving to the hospital.    Plan:  - Continue home Carvedilol and Imdur  - Eplerenone not available in hospital; in setting of his hypokalemia, will use Spironolactone if additional antihypertensives are required      VTE Risk Mitigation (From admission, onward)           Ordered     IP VTE HIGH RISK PATIENT  Once         01/27/24 1240     Place sequential compression device  Until discontinued         01/27/24 1240                    Discharge Planning   FLACO:      Code Status: Full Code   Is the patient medically ready for discharge?:     Reason for patient still in hospital (select all that apply): Patient trending condition, Laboratory test, Treatment, Consult recommendations, and PT / OT recommendations  Discharge Plan A: Home, Home with family          ________________________  Jude Sanford MD  [facility] PGY-2

## 2024-01-31 NOTE — PLAN OF CARE
Problem: Adult Inpatient Plan of Care  Goal: Plan of Care Review  Outcome: Ongoing, Progressing  Goal: Patient-Specific Goal (Individualized)  Outcome: Ongoing, Progressing  Goal: Absence of Hospital-Acquired Illness or Injury  Outcome: Ongoing, Progressing  Goal: Optimal Comfort and Wellbeing  Outcome: Ongoing, Progressing  Goal: Readiness for Transition of Care  Outcome: Ongoing, Progressing      -Patient c/o worsening cough, PRNs given. Patient also requested to have breathing treatments ordered. Patient has coarse breath sounds throughout with expiratory wheezing. Remains on room air. Kidney function appears to have worsen today. See labs.      Latest Reference Range & Units Most Recent 01/30/24 02:30 01/31/24 03:18   BUN 8 - 23 mg/dL 64 (H)  1/31/24 03:18 43 (H) 64 (H)   Creatinine 0.5 - 1.4 mg/dL 6.4 (H)  1/31/24 03:18 4.6 (H) 6.4 (H)   (H): Data is abnormally high

## 2024-01-31 NOTE — PLAN OF CARE
OT coevaluation performed this date to maximize functional outcomes in anticipation of SOB, decreased endurance. Patient's prehospitalization baseline includes Independent in all ADLs/functional mobility with no AD. On evaluation, patient performed ADLs/functional mobility with [place] and no AD. Patient reported increased SOB with ambulation; SpO2 monitored, 96% on RA. Recommend d/c from skilled acute OT. No DME needs.      Problem: Occupational Therapy  Goal: Occupational Therapy Goal  Description: Goals to be met by: 1/31/2024     Patient will increase functional independence with ADLs by performing:  Will demonstrate performance of toilet transfer including lower body dressing and item retrieval. Met      Outcome: Adequate for Care Transition

## 2024-01-31 NOTE — ASSESSMENT & PLAN NOTE
Patient presents with AST/ALT of 481/302, (24/19 5 months ago). Denies drinking alcohol.    Plan:  Will hold PRN Tylenol  Abdominal ultrasound with no acute abnormalities  Hepatitis panel negative

## 2024-01-31 NOTE — SUBJECTIVE & OBJECTIVE
Past Medical History:   Diagnosis Date    Allergy     Anemia     Anticoagulant long-term use     Arthritis     CKD (chronic kidney disease) stage 4, GFR 15-29 ml/min     COPD (chronic obstructive pulmonary disease) 08/20/2021    Coronary artery disease     Heart attack 10/04/2019    Hematuria     Hemothorax     Hyperlipidemia     Hypertension     Hyperuricemia     Hypocalcemia     Hypokalemia     Hypophosphatemia     Hypothyroidism 3/2/2023    PAD (peripheral artery disease)     Proteinuria     Vitamin D deficiency        Past Surgical History:   Procedure Laterality Date    ABDOMINAL AORTOGRAPHY N/A 5/10/2019    Procedure: AORTOGRAM-ABDOMINAL;  Surgeon: Keo Jenkins MD;  Location: [facility] CATH LAB/EP;  Service: Cardiology;  Laterality: N/A;  RSFA intervention     AORTOGRAPHY WITH SERIALOGRAPHY N/A 12/3/2021    Procedure: AORTOGRAM, WITH SERIALOGRAPHY;  Surgeon: Keo Jenkins MD;  Location: [facility] CATH LAB/EP;  Service: Cardiology;  Laterality: N/A;    AORTOGRAPHY WITH SERIALOGRAPHY N/A 4/1/2022    Procedure: AORTOGRAM, WITH SERIALOGRAPHY;  Surgeon: Keo Jenkins MD;  Location: [facility] CATH LAB/EP;  Service: Cardiology;  Laterality: N/A;    ATHERECTOMY, CORONARY N/A 4/25/2023    Procedure: Atherectomy-coronary;  Surgeon: Keo Jenkins MD;  Location: [facility] CATH LAB/EP;  Service: Cardiology;  Laterality: N/A;    BONE MARROW BIOPSY Right 10/12/2021    Procedure: BIOPSY-BONE MARROW;  Surgeon: Naseem Woods MD;  Location: [facility] OR;  Service: Oncology;  Laterality: Right;    CARDIAC CATHETERIZATION      CATARACT EXTRACTION      CATARACT EXTRACTION W/  INTRAOCULAR LENS IMPLANT Right 6/8/2020    Procedure: EXTRACTION, CATARACT, WITH IOL INSERTION;  Surgeon: Tin Light MD;  Location: [facility] OR;  Service: Ophthalmology;  Laterality: Right;    CATARACT EXTRACTION W/  INTRAOCULAR LENS IMPLANT Left 7/2/2020    Procedure: EXTRACTION, CATARACT, WITH IOL INSERTION;  Surgeon: Tin Light MD;  Location: [facility] OR;  Service:  Ophthalmology;  Laterality: Left;    COLONOSCOPY N/A 1/6/2022    Procedure: COLONOSCOPY;  Surgeon: Kathe Penaloza MD;  Location: [facility] ENDO (2ND FLR);  Service: Endoscopy;  Laterality: N/A;  COVID test at [facility] on 1/3-GT  okay to hold Plavix for 5 days and aspirin per Dr. Becerra  2nd floor due toextensive cardiac history   instructions mailed and my [facility] portal -     CORONARY ANGIOGRAPHY N/A 3/29/2019    Procedure: ANGIOGRAM, CORONARY ARTERY;  Surgeon: Keo Jenkins MD;  Location: [facility] CATH LAB/EP;  Service: Cardiology;  Laterality: N/A;    CORONARY ANGIOGRAPHY Left 10/11/2019    Procedure: ANGIOGRAM, CORONARY ARTERY;  Surgeon: Keo Jenkins MD;  Location: [facility] CATH LAB/EP;  Service: Cardiology;  Laterality: Left;    CORONARY ANGIOGRAPHY N/A 4/10/2023    Procedure: ANGIOGRAM, CORONARY ARTERY;  Surgeon: Keo Jenkins MD;  Location: [facility] CATH LAB/EP;  Service: Cardiology;  Laterality: N/A;    CORONARY ANGIOPLASTY WITH STENT PLACEMENT  03/29/2019    mid and distal RCA    ESOPHAGOGASTRODUODENOSCOPY N/A 1/6/2022    Procedure: EGD (ESOPHAGOGASTRODUODENOSCOPY);  Surgeon: Kathe Penaloza MD;  Location: [facility] ENDO (2ND FLR);  Service: Endoscopy;  Laterality: N/A;    EYE SURGERY      INSTANTANEOUS WAVE-FREE RATIO (IFR) N/A 4/25/2023    Procedure: Instantaneous Wave-Free Ratio (IFR);  Surgeon: Keo Jenkins MD;  Location: [facility] CATH LAB/EP;  Service: Cardiology;  Laterality: N/A;    INTRAVASCULAR ULTRASOUND, NON-CORONARY  8/12/2022    Procedure: Intravascular Ultrasound, Non-Coronary;  Surgeon: Keo Jenkins MD;  Location: [facility] CATH LAB/EP;  Service: Cardiology;;    IVUS, CORONARY  4/25/2023    Procedure: IVUS, Coronary;  Surgeon: Keo Jenkins MD;  Location: [facility] CATH LAB/EP;  Service: Cardiology;;    IVUS, CORONARY  5/16/2023    Procedure: IVUS, Coronary;  Surgeon: Keo Jenkins MD;  Location: [facility] CATH LAB/EP;  Service: Cardiology;;  CX    LEFT HEART CATHETERIZATION N/A 3/29/2019    Procedure: Left heart  cath;  Surgeon: Keo Jenkins MD;  Location: [facility] CATH LAB/EP;  Service: Cardiology;  Laterality: N/A;    LEFT HEART CATHETERIZATION Left 10/8/2019    Procedure: Left heart cath;  Surgeon: Keo Jenkins MD;  Location: [facility] CATH LAB/EP;  Service: Cardiology;  Laterality: Left;    LEFT HEART CATHETERIZATION Left 4/10/2023    Procedure: Left heart cath;  Surgeon: Keo Jenkins MD;  Location: [facility] CATH LAB/EP;  Service: Cardiology;  Laterality: Left;    LEFT HEART CATHETERIZATION Left 4/25/2023    Procedure: Left heart cath;  Surgeon: Keo Jenkins MD;  Location: [facility] CATH LAB/EP;  Service: Cardiology;  Laterality: Left;    LEFT HEART CATHETERIZATION Left 5/16/2023    Procedure: Left heart cath;  Surgeon: Keo Jenkins MD;  Location: [facility] CATH LAB/EP;  Service: Cardiology;  Laterality: Left;    PERCUTANEOUS TRANSLUMINAL ANGIOPLASTY (PTA) OF PERIPHERAL VESSEL N/A 7/12/2019    Procedure: PTA, PERIPHERAL VESSEL;  Surgeon: Keo Jenkins MD;  Location: [facility] CATH LAB/EP;  Service: Cardiology;  Laterality: N/A;    PERCUTANEOUS TRANSLUMINAL ANGIOPLASTY (PTA) OF PERIPHERAL VESSEL Left 5/20/2022    Procedure: PTA, PERIPHERAL VESSEL;  Surgeon: Keo Jenkins MD;  Location: [facility] CATH LAB/EP;  Service: Cardiology;  Laterality: Left;    PERCUTANEOUS TRANSLUMINAL ANGIOPLASTY (PTA) OF PERIPHERAL VESSEL Right 8/12/2022    Procedure: PTA, PERIPHERAL VESSEL;  Surgeon: Keo Jenkins MD;  Location: [facility] CATH LAB/EP;  Service: Cardiology;  Laterality: Right;    PERCUTANEOUS TRANSLUMINAL BALLOON ANGIOPLASTY OF CORONARY ARTERY  4/25/2023    Procedure: Angioplasty-coronary;  Surgeon: Keo Jenkins MD;  Location: [facility] CATH LAB/EP;  Service: Cardiology;;    PTCA, SINGLE VESSEL  5/16/2023    Procedure: PTCA, Single Vessel;  Surgeon: Keo Jenkins MD;  Location: [facility] CATH LAB/EP;  Service: Cardiology;;  CX    STENT, DRUG ELUTING, SINGLE VESSEL, CORONARY  4/25/2023    Procedure: Stent, Drug Eluting, Single Vessel, Coronary;   Surgeon: Keo Jenkins MD;  Location: [facility] CATH LAB/EP;  Service: Cardiology;;    STENT, DRUG ELUTING, SINGLE VESSEL, CORONARY  5/16/2023    Procedure: Stent, Drug Eluting, Single Vessel, Coronary;  Surgeon: Keo Jenkins MD;  Location: [facility] CATH LAB/EP;  Service: Cardiology;;  CX    TRANSESOPHAGEAL ECHOCARDIOGRAM WITH POSSIBLE CARDIOVERSION (JOSE W/ POSS CARDIOVERSION) N/A 6/19/2023    Procedure: Transesophageal echo (JOSE) intra-procedure log documentation;  Surgeon: Keo Jenkins MD;  Location: [facility] CATH LAB/EP;  Service: Cardiology;  Laterality: N/A;       Review of patient's allergies indicates:   Allergen Reactions    Ace inhibitors Rash       Medications:  Medications Prior to Admission   Medication Sig    albuterol (PROVENTIL/VENTOLIN HFA) 90 mcg/actuation inhaler INHALE 2 PUFFS INTO THE LUNGS EVERY 6 HOURS AS NEEDED FOR WHEEZING    albuterol-ipratropium (DUO-NEB) 2.5 mg-0.5 mg/3 mL nebulizer solution Take 3 mLs by nebulization every 6 (six) hours as needed for Wheezing or Shortness of Breath (or cough). Rescue    amiodarone (PACERONE) 200 MG Tab Take 1 tablet (200 mg total) by mouth 2 (two) times daily.    apixaban (ELIQUIS) 5 mg Tab Take 1 tablet (5 mg total) by mouth 2 (two) times daily.    carvediloL (COREG) 25 MG tablet Take 1 tablet (25 mg total) by mouth 2 (two) times daily with meals.    clopidogreL (PLAVIX) 75 mg tablet Take 1 tablet (75 mg total) by mouth once daily.    coenzyme Q10 100 mg capsule Take 100 mg by mouth once daily.    eplerenone (INSPRA) 50 MG Tab Take 1 tablet (50 mg total) by mouth once daily.    isosorbide mononitrate (IMDUR) 60 MG 24 hr tablet Take 1 tablet (60 mg total) by mouth once daily.    levothyroxine (SYNTHROID) 50 MCG tablet Take one tablet PO every morning on an empty stomach and wait at least 1 hour before eating or taking other medications (Patient taking differently: Take 50 mcg by mouth before breakfast.  wait at least 1 hour before eating or taking other  medications)    nitroGLYCERIN (NITROSTAT) 0.4 MG SL tablet Place 0.4 mg under the tongue every 5 (five) minutes as needed for Chest pain.    ranolazine (RANEXA) 500 MG Tb12 Take 1 tablet (500 mg total) by mouth 2 (two) times daily.    rosuvastatin (CRESTOR) 40 MG Tab Take 1 tablet (40 mg total) by mouth every evening.    predniSONE (DELTASONE) 20 MG tablet Take 3 pills daily for 3 days, then 2 pills daily for 3 days, then 1 pill daily for 3 days, then 1/2 pill daily for 4 days. Start 10/5/23 (Patient not taking: Reported on 2024)    sodium bicarbonate 650 MG tablet Take 1 tablet (650 mg total) by mouth once daily. for 30 doses (Patient not taking: Reported on 2024)     Antibiotics (From admission, onward)      Start     Stop Route Frequency Ordered    24 0030  cefTRIAXone (ROCEPHIN) 2 g in dextrose 5 % in water (D5W) 100 mL IVPB (MB+)         -- IV Every 12 hours (non-standard times) 24 1445          Antifungals (From admission, onward)      None          Antivirals (From admission, onward)      None             Immunization History   Administered Date(s) Administered    COVID-19, MRNA, LN-S, PF (MODERNA FULL 0.5 ML DOSE) 2021, 2021    Influenza 10/28/2018, 2020, 2023    Influenza - Quadrivalent 2015    Influenza - Quadrivalent - PF *Preferred* (6 months and older) 2020    Pneumococcal Conjugate - 13 Valent 2020    Pneumococcal Conjugate - 20 Valent 2023    Tdap 2023       Family History       Problem Relation (Age of Onset)    Aneurysm Mother    Cancer Father    Diabetes Sister    Heart disease Mother    Hypertension Mother, Sister    No Known Problems Brother, Son          Social History     Socioeconomic History    Marital status:    Occupational History     Employer: [employer]   Tobacco Use    Smoking status: Former     Current packs/day: 0.00     Types: Cigarettes     Quit date: 1999     Years since quittin.7     Smokeless tobacco: Never   Substance and Sexual Activity    Alcohol use: No     Alcohol/week: 0.0 standard drinks of alcohol    Drug use: No    Sexual activity: Yes     Partners: Female     Social Determinants of Health     Financial Resource Strain: Medium Risk (11/28/2023)    Overall Financial Resource Strain (CARDIA)     Difficulty of Paying Living Expenses: Somewhat hard   Food Insecurity: Food Insecurity Present (11/28/2023)    Hunger Vital Sign     Worried About Running Out of Food in the Last Year: Sometimes true     Ran Out of Food in the Last Year: Often true   Transportation Needs: No Transportation Needs (11/28/2023)    PRAPARE - Transportation     Lack of Transportation (Medical): No     Lack of Transportation (Non-Medical): No   Physical Activity: Insufficiently Active (11/28/2023)    Exercise Vital Sign     Days of Exercise per Week: 2 days     Minutes of Exercise per Session: 10 min   Stress: No Stress Concern Present (11/28/2023)    [employer] [place] [employer] - Occupational Stress Questionnaire     Feeling of Stress : Only a little   Social Connections: Unknown (11/28/2023)    Social Connection and Isolation Panel [NHANES]     Frequency of Communication with Friends and Family: More than three times a week     Frequency of Social Gatherings with Friends and Family: Never     Active Member of Clubs or Organizations: No     Attends Club or Organization Meetings: Never     Marital Status:    Recent Concern: Social Connections - Moderately Isolated (9/8/2023)    Social Connection and Isolation Panel [NHANES]     Frequency of Communication with Friends and Family: Three times a week     Frequency of Social Gatherings with Friends and Family: Never     Attends [employer] Services: Never     Active Member of Clubs or Organizations: No     Attends Club or Organization Meetings: Patient declined     Marital Status:    Housing Stability: Low Risk  (11/28/2023)    Housing Stability  Vital Sign     Unable to Pay for Housing in the Last Year: No     Number of Places Lived in the Last Year: 0     Unstable Housing in the Last Year: No   Recent Concern: Housing Stability - High Risk (9/8/2023)    Housing Stability Vital Sign     Unable to Pay for Housing in the Last Year: Yes     Number of Places Lived in the Last Year: 0     Unstable Housing in the Last Year: No     Review of Systems   Constitutional:  Negative for appetite change, chills and fever.   HENT:  Negative for rhinorrhea, sore throat and trouble swallowing.    Eyes:  Negative for pain and visual disturbance.   Respiratory:  Negative for cough and shortness of breath.    Cardiovascular:  Negative for chest pain, palpitations and leg swelling.   Gastrointestinal:  Negative for abdominal pain, constipation, diarrhea, nausea and vomiting.   Genitourinary:  Negative for dysuria and hematuria.   Musculoskeletal:  Negative for gait problem and neck stiffness.   Skin:  Negative for rash and wound.   Neurological:  Negative for tremors, weakness and headaches.   Psychiatric/Behavioral:  Negative for agitation and confusion.      Objective:     Vital Signs (Most Recent):  Temp: 98 °F (36.7 °C) (01/31/24 0738)  Pulse: (!) 55 (01/31/24 1214)  Resp: (!) 23 (01/31/24 1135)  BP: (!) 145/64 (01/31/24 0738)  SpO2: 95 % (01/31/24 1135) Vital Signs (24h Range):  Temp:  [97.6 °F (36.4 °C)-98.3 °F (36.8 °C)] 98 °F (36.7 °C)  Pulse:  [52-67] 55  Resp:  [18-23] 23  SpO2:  [93 %-96 %] 95 %  BP: (130-147)/(61-69) 145/64     Weight: 83.3 kg (183 lb 10.3 oz)  Body mass index is 26.35 kg/m².    Estimated Creatinine Clearance: 12.4 mL/min (A) (based on SCr of 6.4 mg/dL (H)).     Physical Exam  Constitutional:       General: He is not in acute distress.     Appearance: Normal appearance. He is normal weight. He is not ill-appearing or toxic-appearing.   HENT:      Head: Normocephalic.      Right Ear: External ear normal.      Left Ear: External ear normal.      Nose:  Nose normal.      Mouth/Throat:      Pharynx: Oropharynx is clear.   Eyes:      Extraocular Movements: Extraocular movements intact.   Cardiovascular:      Rate and Rhythm: Normal rate and regular rhythm.      Pulses: Normal pulses.   Pulmonary:      Effort: Pulmonary effort is normal. No respiratory distress.      Breath sounds: Normal breath sounds. No wheezing, rhonchi or rales.   Abdominal:      General: Abdomen is flat. There is no distension.      Palpations: Abdomen is soft.      Tenderness: There is no abdominal tenderness. There is no guarding or rebound.   Musculoskeletal:         General: Normal range of motion.      Cervical back: Normal range of motion.      Right lower leg: No edema.      Left lower leg: No edema.   Skin:     General: Skin is warm.      Coloration: Skin is not jaundiced.   Neurological:      General: No focal deficit present.      Mental Status: He is alert and oriented to person, place, and time.      Motor: No weakness.   Psychiatric:         Mood and Affect: Mood normal.         Behavior: Behavior normal.         Thought Content: Thought content normal.          Significant Labs: All pertinent labs within the past 24 hours have been reviewed.    Significant Imaging: I have reviewed all pertinent imaging results/findings within the past 24 hours.

## 2024-01-31 NOTE — PT/OT/SLP EVAL
Occupational Therapy   Evaluation and Discharge Note    Name: Domingo Gross  MRN: [id number]  Admitting Diagnosis: CAP (community acquired pneumonia)  Recent Surgery: * No surgery found *      Recommendations:     Discharge Recommendations:  (No therapy recommendation; however, educated patient on self-request OP. Cardiopulm or OP PT pending need)  Discharge Equipment Recommendations: none  Barriers to discharge:  None    Assessment:     Domingo Gross is a 63 y.o. male with a medical diagnosis of CAP (community acquired pneumonia). At this time, patient is functioning at their prior level of function and does not require further acute OT services.     OT coevaluation performed this date to maximize functional outcomes in anticipation of SOB, decreased endurance. Patient's prehospitalization baseline includes Independent in all ADLs/functional mobility with no AD. On evaluation, patient performed ADLs/functional mobility with [place] and no AD. Patient reported increased SOB with ambulation; SpO2 monitored, 96% on RA. Recommend d/c from skilled acute OT. No DME needs.     Plan:     During this hospitalization, patient does not require further acute OT services.  Please re-consult if situation changes.    Plan of Care Reviewed with: patient    Subjective     Chief Complaint: patient reported increased SOB with walking  Patient/Family Comments/goals: patient asked regarding outpatient services    Occupational Profile:  Living Environment: Patient lives with wife and grandson in 1SH, 4-5 KALIE, B rails, tub shower  Previous level of function: Patient's prehospitalization baseline includes Independent in all ADLs/IADLs/functional mobility with no AD; works as   Equipment Used at home: none  Assistance upon Discharge: wife, grandson if needed    Pain/Comfort:  Pain Rating 1: 0/10  Pain Addressed 1: Reposition  Pain Rating Post-Intervention 1: 0/10        Objective:     Communicated with: nursing  prior to session.  Patient found HOB elevated with telemetry, peripheral IV upon OT entry to room.    General Precautions: Standard, fall  Orthopedic Precautions: N/A  Braces: N/A  Respiratory Status: Room air     Occupational Performance:    Bed Mobility:    Patient completed Supine to Sit with independence  Patient completed Sit to Supine with independence  Patient completed Scooting with independence    Functional Mobility/Transfers:  Patient completed Sit <> Stand Transfer with independence  with  no assistive device   Patient completed Toilet Transfer Step Transfer technique to toilet in true bathroom with independence with  no AD  Functional Mobility: Patient performed in room mobility / out of room mobility / transfers with independence and no AD. OT student on standby during PT directed stair training. Patient performed item retrieval task from bottom to top bedside drawers with independence and no AD.    Activities of Daily Living:  Upper Body Dressing: independence for gown  Lower Body Dressing: independence socks, figure 4 position    Cognitive/Visual Perceptual:  Cognitive/Psychosocial Skills:     -       Oriented to: Person, Place, Time, and Situation   -       Follows Commands/attention:Follows multistep  commands  -       Communication: clear/fluent  -       Mood/Affect/Coping skills/emotional control: Cooperative and Pleasant  Visual/Perceptual:      -Intact ; wears reading glasses    Physical Exam:  Sensation:    -       Intact  Motor Planning:    -       Intact  Dominant hand:    -       Right  Upper Extremity Range of Motion:     -       Right Upper Extremity: WFL  -       Left Upper Extremity: WFL  Upper Extremity Strength:    -       Right Upper Extremity: WFL  -       Left Upper Extremity: WFL   Strength:    -       Right Upper Extremity: WFL  -       Left Upper Extremity: WFL  Gross motor coordination:   WFL    AMPAC 6 Click ADL:  AMPAC Total Score: 24    Treatment &  Education:  A&Ox4.  Patient educated on role of OT/ POC development.   Co-evaluation with physical therapy in consideration of anticipated SOB.   Occupational profile developed via interview.   Patient guided to transition eob for assessment.   Initiated ADL / functional mobility training as above with instruction on slow transitions.   Patient reported increased SOB at end of session; SpO2 monitored at 96%, RA.   Answered questions within scope.   Returned to bed with all needs met.    Patient left HOB elevated with all lines intact, call button in reach, and nursing notified    GOALS:   Multidisciplinary Problems       Occupational Therapy Goals          Problem: Occupational Therapy    Goal Priority Disciplines Outcome Interventions   Occupational Therapy Goal     OT, PT/OT Adequate for Care Transition    Description: Goals to be met by: 1/31/2024     Patient will increase functional independence with ADLs by performing:  Will demonstrate performance of toilet transfer including lower body dressing and item retrieval. Met                           History:     Past Medical History:   Diagnosis Date    Allergy     Anemia     Anticoagulant long-term use     Arthritis     CKD (chronic kidney disease) stage 4, GFR 15-29 ml/min     COPD (chronic obstructive pulmonary disease) 08/20/2021    Coronary artery disease     Heart attack 10/04/2019    Hematuria     Hemothorax     Hyperlipidemia     Hypertension     Hyperuricemia     Hypocalcemia     Hypokalemia     Hypophosphatemia     Hypothyroidism 3/2/2023    PAD (peripheral artery disease)     Proteinuria     Vitamin D deficiency          Past Surgical History:   Procedure Laterality Date    ABDOMINAL AORTOGRAPHY N/A 5/10/2019    Procedure: AORTOGRAM-ABDOMINAL;  Surgeon: Keo Jenkins MD;  Location: [facility] CATH LAB/EP;  Service: Cardiology;  Laterality: N/A;  RSFA intervention     AORTOGRAPHY WITH SERIALOGRAPHY N/A 12/3/2021    Procedure: AORTOGRAM, WITH SERIALOGRAPHY;   Surgeon: Keo Jenkins MD;  Location: [facility] CATH LAB/EP;  Service: Cardiology;  Laterality: N/A;    AORTOGRAPHY WITH SERIALOGRAPHY N/A 4/1/2022    Procedure: AORTOGRAM, WITH SERIALOGRAPHY;  Surgeon: Keo Jenkins MD;  Location: [facility] CATH LAB/EP;  Service: Cardiology;  Laterality: N/A;    ATHERECTOMY, CORONARY N/A 4/25/2023    Procedure: Atherectomy-coronary;  Surgeon: Keo Jenkins MD;  Location: [facility] CATH LAB/EP;  Service: Cardiology;  Laterality: N/A;    BONE MARROW BIOPSY Right 10/12/2021    Procedure: BIOPSY-BONE MARROW;  Surgeon: Naseem Woods MD;  Location: [facility] OR;  Service: Oncology;  Laterality: Right;    CARDIAC CATHETERIZATION      CATARACT EXTRACTION      CATARACT EXTRACTION W/  INTRAOCULAR LENS IMPLANT Right 6/8/2020    Procedure: EXTRACTION, CATARACT, WITH IOL INSERTION;  Surgeon: Tin Light MD;  Location: [facility] OR;  Service: Ophthalmology;  Laterality: Right;    CATARACT EXTRACTION W/  INTRAOCULAR LENS IMPLANT Left 7/2/2020    Procedure: EXTRACTION, CATARACT, WITH IOL INSERTION;  Surgeon: Tin Light MD;  Location: [facility] OR;  Service: Ophthalmology;  Laterality: Left;    COLONOSCOPY N/A 1/6/2022    Procedure: COLONOSCOPY;  Surgeon: Kathe Penaloza MD;  Location: [address]);  Service: Endoscopy;  Laterality: N/A;  COVID test at [facility] on 1/3-GT  okay to hold Plavix for 5 days and aspirin per Dr. Becerra  2nd floor due toextensive cardiac history   instructions mailed and my [facility] portal -     CORONARY ANGIOGRAPHY N/A 3/29/2019    Procedure: ANGIOGRAM, CORONARY ARTERY;  Surgeon: Keo Jenkins MD;  Location: [facility] CATH LAB/EP;  Service: Cardiology;  Laterality: N/A;    CORONARY ANGIOGRAPHY Left 10/11/2019    Procedure: ANGIOGRAM, CORONARY ARTERY;  Surgeon: Keo Jenkins MD;  Location: [facility] CATH LAB/EP;  Service: Cardiology;  Laterality: Left;    CORONARY ANGIOGRAPHY N/A 4/10/2023    Procedure: ANGIOGRAM, CORONARY ARTERY;  Surgeon: Keo Jenkins MD;  Location: [facility] CATH  LAB/EP;  Service: Cardiology;  Laterality: N/A;    CORONARY ANGIOPLASTY WITH STENT PLACEMENT  03/29/2019    mid and distal RCA    ESOPHAGOGASTRODUODENOSCOPY N/A 1/6/2022    Procedure: EGD (ESOPHAGOGASTRODUODENOSCOPY);  Surgeon: Kathe Penaloza MD;  Location: [address]);  Service: Endoscopy;  Laterality: N/A;    EYE SURGERY      INSTANTANEOUS WAVE-FREE RATIO (IFR) N/A 4/25/2023    Procedure: Instantaneous Wave-Free Ratio (IFR);  Surgeon: Keo Jenkins MD;  Location: [facility] CATH LAB/EP;  Service: Cardiology;  Laterality: N/A;    INTRAVASCULAR ULTRASOUND, NON-CORONARY  8/12/2022    Procedure: Intravascular Ultrasound, Non-Coronary;  Surgeon: Keo Jenkins MD;  Location: [facility] CATH LAB/EP;  Service: Cardiology;;    IVUS, CORONARY  4/25/2023    Procedure: IVUS, Coronary;  Surgeon: Keo Jenkins MD;  Location: [facility] CATH LAB/EP;  Service: Cardiology;;    IVUS, CORONARY  5/16/2023    Procedure: IVUS, Coronary;  Surgeon: Keo Jenkins MD;  Location: [facility] CATH LAB/EP;  Service: Cardiology;;  CX    LEFT HEART CATHETERIZATION N/A 3/29/2019    Procedure: Left heart cath;  Surgeon: Keo Jenkins MD;  Location: [facility] CATH LAB/EP;  Service: Cardiology;  Laterality: N/A;    LEFT HEART CATHETERIZATION Left 10/8/2019    Procedure: Left heart cath;  Surgeon: Keo Jenkins MD;  Location: [facility] CATH LAB/EP;  Service: Cardiology;  Laterality: Left;    LEFT HEART CATHETERIZATION Left 4/10/2023    Procedure: Left heart cath;  Surgeon: Keo Jenkins MD;  Location: [facility] CATH LAB/EP;  Service: Cardiology;  Laterality: Left;    LEFT HEART CATHETERIZATION Left 4/25/2023    Procedure: Left heart cath;  Surgeon: Keo Jenkins MD;  Location: [facility] CATH LAB/EP;  Service: Cardiology;  Laterality: Left;    LEFT HEART CATHETERIZATION Left 5/16/2023    Procedure: Left heart cath;  Surgeon: Keo Jenkins MD;  Location: [facility] CATH LAB/EP;  Service: Cardiology;  Laterality: Left;    PERCUTANEOUS TRANSLUMINAL ANGIOPLASTY (PTA) OF PERIPHERAL  VESSEL N/A 7/12/2019    Procedure: PTA, PERIPHERAL VESSEL;  Surgeon: Keo Jenkins MD;  Location: [facility] CATH LAB/EP;  Service: Cardiology;  Laterality: N/A;    PERCUTANEOUS TRANSLUMINAL ANGIOPLASTY (PTA) OF PERIPHERAL VESSEL Left 5/20/2022    Procedure: PTA, PERIPHERAL VESSEL;  Surgeon: Keo Jenkins MD;  Location: [facility] CATH LAB/EP;  Service: Cardiology;  Laterality: Left;    PERCUTANEOUS TRANSLUMINAL ANGIOPLASTY (PTA) OF PERIPHERAL VESSEL Right 8/12/2022    Procedure: PTA, PERIPHERAL VESSEL;  Surgeon: Keo Jenkins MD;  Location: [facility] CATH LAB/EP;  Service: Cardiology;  Laterality: Right;    PERCUTANEOUS TRANSLUMINAL BALLOON ANGIOPLASTY OF CORONARY ARTERY  4/25/2023    Procedure: Angioplasty-coronary;  Surgeon: Keo Jenkins MD;  Location: [facility] CATH LAB/EP;  Service: Cardiology;;    PTCA, SINGLE VESSEL  5/16/2023    Procedure: PTCA, Single Vessel;  Surgeon: Keo Jenkins MD;  Location: [facility] CATH LAB/EP;  Service: Cardiology;;  CX    STENT, DRUG ELUTING, SINGLE VESSEL, CORONARY  4/25/2023    Procedure: Stent, Drug Eluting, Single Vessel, Coronary;  Surgeon: Keo Jenkins MD;  Location: [facility] CATH LAB/EP;  Service: Cardiology;;    STENT, DRUG ELUTING, SINGLE VESSEL, CORONARY  5/16/2023    Procedure: Stent, Drug Eluting, Single Vessel, Coronary;  Surgeon: Keo Jenkins MD;  Location: [facility] CATH LAB/EP;  Service: Cardiology;;  CX    TRANSESOPHAGEAL ECHOCARDIOGRAM WITH POSSIBLE CARDIOVERSION (JOSE W/ POSS CARDIOVERSION) N/A 6/19/2023    Procedure: Transesophageal echo (JOSE) intra-procedure log documentation;  Surgeon: Keo Jenkins MD;  Location: [facility] CATH LAB/EP;  Service: Cardiology;  Laterality: N/A;       Time Tracking:     OT Date of Treatment: 01/31/24  OT Start Time: 1441  OT Stop Time: 1504  OT Total Time (min): 23 min total time; cotx PT    Billable Minutes:Evaluation 20 min    1/31/2024

## 2024-01-31 NOTE — SUBJECTIVE & OBJECTIVE
Interval History: No adverse events overnight. LFTs continue to improve. Na, BUN/Cr continue to worse. Patient reports asymptomatic at this time.    Review of Systems   Constitutional:  Negative for appetite change, chills and fever.   HENT:  Negative for rhinorrhea, sore throat and trouble swallowing.    Eyes:  Negative for pain and visual disturbance.   Respiratory:  Negative for cough and shortness of breath.    Cardiovascular:  Negative for chest pain, palpitations and leg swelling.   Gastrointestinal:  Negative for abdominal pain, constipation, diarrhea, nausea and vomiting.   Genitourinary:  Negative for dysuria and hematuria.   Musculoskeletal:  Negative for gait problem and neck stiffness.   Skin:  Negative for rash and wound.   Neurological:  Negative for tremors, weakness and headaches.   Psychiatric/Behavioral:  Negative for agitation and confusion.      Objective:     Vital Signs (Most Recent):  Temp: 98 °F (36.7 °C) (01/31/24 0738)  Pulse: (!) 55 (01/31/24 1214)  Resp: (!) 23 (01/31/24 1135)  BP: (!) 145/64 (01/31/24 0738)  SpO2: 95 % (01/31/24 1135) Vital Signs (24h Range):  Temp:  [97.6 °F (36.4 °C)-98.3 °F (36.8 °C)] 98 °F (36.7 °C)  Pulse:  [52-67] 55  Resp:  [18-23] 23  SpO2:  [93 %-96 %] 95 %  BP: (130-147)/(61-69) 145/64     Weight: 83.3 kg (183 lb 10.3 oz)  Body mass index is 26.35 kg/m².    Intake/Output Summary (Last 24 hours) at 1/31/2024 1418  Last data filed at 1/31/2024 0552  Gross per 24 hour   Intake 400 ml   Output 400 ml   Net 0 ml         Physical Exam  Vitals and nursing note reviewed.   Constitutional:       General: He is not in acute distress.     Appearance: Normal appearance. He is normal weight. He is not ill-appearing or toxic-appearing.   HENT:      Head: Normocephalic.      Right Ear: External ear normal.      Left Ear: External ear normal.      Nose: Nose normal.      Mouth/Throat:      Pharynx: Oropharynx is clear.   Eyes:      Extraocular Movements: Extraocular movements  intact.   Cardiovascular:      Rate and Rhythm: Normal rate and regular rhythm.      Pulses: Normal pulses.   Pulmonary:      Effort: Pulmonary effort is normal. No respiratory distress.      Breath sounds: Normal breath sounds. No wheezing, rhonchi or rales.   Abdominal:      General: Abdomen is flat. There is no distension.      Palpations: Abdomen is soft.      Tenderness: There is no abdominal tenderness. There is no guarding or rebound.   Musculoskeletal:         General: Normal range of motion.      Cervical back: Normal range of motion.      Right lower leg: No edema.      Left lower leg: No edema.   Skin:     General: Skin is warm.      Coloration: Skin is not jaundiced.   Neurological:      General: No focal deficit present.      Mental Status: He is alert and oriented to person, place, and time.      Motor: No weakness.   Psychiatric:         Mood and Affect: Mood normal.         Behavior: Behavior normal.         Thought Content: Thought content normal.             Significant Labs: All pertinent labs within the past 24 hours have been reviewed.  ABGs:   Recent Labs   Lab 01/31/24  1129   PH 7.236*   PCO2 39.9   HCO3 16.9*   POCSATURATED 67.0*   PO2 40.8     CBC:   Recent Labs   Lab 01/30/24  0230 01/31/24  0318 01/31/24  1126   WBC 5.29 5.45 4.75   HGB 9.1* 8.9* 9.0*   HCT 26.5* 25.7* 25.5*   PLT 68* 67* 58*     CMP:   Recent Labs   Lab 01/30/24  0230 01/31/24  0318   * 128*   K 4.0 3.8    101   CO2 16* 14*   GLU 94 91   BUN 43* 64*   CREATININE 4.6* 6.4*   CALCIUM 7.7* 7.8*   PROT 5.0* 5.1*   ALBUMIN 2.0* 1.9*   BILITOT 1.5* 1.0   ALKPHOS 69 73   AST 2,162* 1,246*   ALT 1,333* 926*   ANIONGAP 11 13       Significant Imaging: I have reviewed all pertinent imaging results/findings within the past 24 hours.

## 2024-01-31 NOTE — PLAN OF CARE
Problem: Physical Therapy  Goal: Physical Therapy Goal  Outcome: Adequate for Care Transition     PT/OT co session due to anticipated complexity of pt's presentation. Pt's PLOF (independent with no AD). Pt at this time Independent with no AD. Pt has no further skilled acute PT needs. D/c acute PT.

## 2024-01-31 NOTE — HPI
"63 year old man with PMHx of HTN, Atrial Fibrillation (sees Dr. Calderón, on Amiodarone, Apixaban), COPD (inhaler PRN), CKD (sees Dr. Payne), Hypothyroidism (Levothyroxine) presents via ambulance to [facility] on 1/27/24 for 1-day history of chest pain, cough, irregular breathing. History was collected from patient and wife, who is at bedside. Patient reports experiencing chest pain, cough, and decreased appetite that started last night. Reports a fever of 102, did not take Tylenol. This morning, wife noticed that he was breathing "funny" and called the ambulance. Patient's boss recently tested positive for Flu/COVID. Patient emphasized that lack of food intake was due to decreased appetite and denied emesis and nausea.    He was started on ceftriaxone and doxy then azithro for CAP. He has had improvement in resp symptoms since admission. Fever curve has improved. He also had elevated LFTs on admission but they have been trending down. Cr has increased from 3.2 to 6.4.  "

## 2024-01-31 NOTE — CONSULTS
[place] [facility]  Infectious Disease  Consult Note    Patient Name: Domingo Gross  MRN: [id number]  Admission Date: 1/27/2024  Hospital Length of Stay: 4 days  Attending Physician: Pawan Garcia,*  Primary Care Provider: Analy Encarnacion MD     Isolation Status: No active isolations    Patient information was obtained from patient and past medical records.      Inpatient consult to Infectious Diseases  Consult performed by: Nahum Stevens MD  Consult ordered by: Jude Sanford MD  Reason for consult: EBV        Assessment/Plan:     ID  EBV infection  63-year-old man with PMHx of HTN, Atrial Fibrillation (sees Dr. Calderón, on Amiodarone, Apixaban), COPD (inhaler PRN), CKD (sees Dr. Payne), Hypothyroidism (Levothyroxine) presents via ambulance to [facility] on 1/27/24 and has been treated for CAP. ID is consulted due to EBV IgG + and CMV IgG +    -viral testing was done due to elevated LFTs and Cr  -LFTs are already improving which wouldn't be expected in viral hepatitis from EBV or CMV  -EBV IgM and DNA are negative  -these serologies likely represent previous infection  -would check quantitative CMV DNA to assess for acute infection  -await kidney biopsy  -treat CAP per primary team, please consult for this if needed  -ID will sign off , please call back if CMV testing or biopsy are concerning for infection        Thank you for your consult. I will sign off. Please contact us if you have any additional questions.    Nahum Stevens MD  Infectious Disease  [place] [facility]    Subjective:     Principal Problem: CAP (community acquired pneumonia)    HPI: 63 year old man with PMHx of HTN, Atrial Fibrillation (sees Dr. Calderón, on Amiodarone, Apixaban), COPD (inhaler PRN), CKD (sees Dr. Payne), Hypothyroidism (Levothyroxine) presents via ambulance to [facility] on 1/27/24 for 1-day history of chest pain, cough, irregular breathing. History was collected from patient and wife, who is at  "bedside. Patient reports experiencing chest pain, cough, and decreased appetite that started last night. Reports a fever of 102, did not take Tylenol. This morning, wife noticed that he was breathing "funny" and called the ambulance. Patient's boss recently tested positive for Flu/COVID. Patient emphasized that lack of food intake was due to decreased appetite and denied emesis and nausea.    He was started on ceftriaxone and doxy then azithro for CAP. He has had improvement in resp symptoms since admission. Fever curve has improved. He also had elevated LFTs on admission but they have been trending down. Cr has increased from 3.2 to 6.4.    Past Medical History:   Diagnosis Date    Allergy     Anemia     Anticoagulant long-term use     Arthritis     CKD (chronic kidney disease) stage 4, GFR 15-29 ml/min     COPD (chronic obstructive pulmonary disease) 08/20/2021    Coronary artery disease     Heart attack 10/04/2019    Hematuria     Hemothorax     Hyperlipidemia     Hypertension     Hyperuricemia     Hypocalcemia     Hypokalemia     Hypophosphatemia     Hypothyroidism 3/2/2023    PAD (peripheral artery disease)     Proteinuria     Vitamin D deficiency        Past Surgical History:   Procedure Laterality Date    ABDOMINAL AORTOGRAPHY N/A 5/10/2019    Procedure: AORTOGRAM-ABDOMINAL;  Surgeon: Keo Jenkins MD;  Location: [facility] CATH LAB/EP;  Service: Cardiology;  Laterality: N/A;  RSFA intervention     AORTOGRAPHY WITH SERIALOGRAPHY N/A 12/3/2021    Procedure: AORTOGRAM, WITH SERIALOGRAPHY;  Surgeon: Keo Jenkins MD;  Location: [facility] CATH LAB/EP;  Service: Cardiology;  Laterality: N/A;    AORTOGRAPHY WITH SERIALOGRAPHY N/A 4/1/2022    Procedure: AORTOGRAM, WITH SERIALOGRAPHY;  Surgeon: Keo Jenkins MD;  Location: [facility] CATH LAB/EP;  Service: Cardiology;  Laterality: N/A;    ATHERECTOMY, CORONARY N/A 4/25/2023    Procedure: Atherectomy-coronary;  Surgeon: Keo Jenkins MD;  Location: [facility] CATH LAB/EP;  Service: " Cardiology;  Laterality: N/A;    BONE MARROW BIOPSY Right 10/12/2021    Procedure: BIOPSY-BONE MARROW;  Surgeon: Naseem Woods MD;  Location: [facility] OR;  Service: Oncology;  Laterality: Right;    CARDIAC CATHETERIZATION      CATARACT EXTRACTION      CATARACT EXTRACTION W/  INTRAOCULAR LENS IMPLANT Right 6/8/2020    Procedure: EXTRACTION, CATARACT, WITH IOL INSERTION;  Surgeon: Tin Light MD;  Location: [facility] OR;  Service: Ophthalmology;  Laterality: Right;    CATARACT EXTRACTION W/  INTRAOCULAR LENS IMPLANT Left 7/2/2020    Procedure: EXTRACTION, CATARACT, WITH IOL INSERTION;  Surgeon: Tin Light MD;  Location: [facility] OR;  Service: Ophthalmology;  Laterality: Left;    COLONOSCOPY N/A 1/6/2022    Procedure: COLONOSCOPY;  Surgeon: Kathe Penaloza MD;  Location: [facility] (2ND FLR);  Service: Endoscopy;  Laterality: N/A;  COVID test at [facility] on 1/3-GT  okay to hold Plavix for 5 days and aspirin per Dr. Becerra  2nd floor due toextensive cardiac history   instructions mailed and my [facility] portal -     CORONARY ANGIOGRAPHY N/A 3/29/2019    Procedure: ANGIOGRAM, CORONARY ARTERY;  Surgeon: Keo Jenkins MD;  Location: [facility] CATH LAB/EP;  Service: Cardiology;  Laterality: N/A;    CORONARY ANGIOGRAPHY Left 10/11/2019    Procedure: ANGIOGRAM, CORONARY ARTERY;  Surgeon: Keo Jenkins MD;  Location: [facility] CATH LAB/EP;  Service: Cardiology;  Laterality: Left;    CORONARY ANGIOGRAPHY N/A 4/10/2023    Procedure: ANGIOGRAM, CORONARY ARTERY;  Surgeon: Keo Jenkins MD;  Location: [facility] CATH LAB/EP;  Service: Cardiology;  Laterality: N/A;    CORONARY ANGIOPLASTY WITH STENT PLACEMENT  03/29/2019    mid and distal RCA    ESOPHAGOGASTRODUODENOSCOPY N/A 1/6/2022    Procedure: EGD (ESOPHAGOGASTRODUODENOSCOPY);  Surgeon: Kathe Penaloza MD;  Location: [facility] ENDO (2ND FLR);  Service: Endoscopy;  Laterality: N/A;    EYE SURGERY      INSTANTANEOUS WAVE-FREE RATIO (IFR) N/A 4/25/2023    Procedure: Instantaneous Wave-Free Ratio  (IFR);  Surgeon: Keo Jenkins MD;  Location: [facility] CATH LAB/EP;  Service: Cardiology;  Laterality: N/A;    INTRAVASCULAR ULTRASOUND, NON-CORONARY  8/12/2022    Procedure: Intravascular Ultrasound, Non-Coronary;  Surgeon: Keo Jenkins MD;  Location: [facility] CATH LAB/EP;  Service: Cardiology;;    IVUS, CORONARY  4/25/2023    Procedure: IVUS, Coronary;  Surgeon: Keo Jenkins MD;  Location: [facility] CATH LAB/EP;  Service: Cardiology;;    IVUS, CORONARY  5/16/2023    Procedure: IVUS, Coronary;  Surgeon: Keo Jenkins MD;  Location: [facility] CATH LAB/EP;  Service: Cardiology;;  CX    LEFT HEART CATHETERIZATION N/A 3/29/2019    Procedure: Left heart cath;  Surgeon: Keo Jenkins MD;  Location: [facility] CATH LAB/EP;  Service: Cardiology;  Laterality: N/A;    LEFT HEART CATHETERIZATION Left 10/8/2019    Procedure: Left heart cath;  Surgeon: Keo Jenkins MD;  Location: [facility] CATH LAB/EP;  Service: Cardiology;  Laterality: Left;    LEFT HEART CATHETERIZATION Left 4/10/2023    Procedure: Left heart cath;  Surgeon: Keo Jenkins MD;  Location: [facility] CATH LAB/EP;  Service: Cardiology;  Laterality: Left;    LEFT HEART CATHETERIZATION Left 4/25/2023    Procedure: Left heart cath;  Surgeon: Keo Jenkins MD;  Location: [facility] CATH LAB/EP;  Service: Cardiology;  Laterality: Left;    LEFT HEART CATHETERIZATION Left 5/16/2023    Procedure: Left heart cath;  Surgeon: Keo Jenkins MD;  Location: [facility] CATH LAB/EP;  Service: Cardiology;  Laterality: Left;    PERCUTANEOUS TRANSLUMINAL ANGIOPLASTY (PTA) OF PERIPHERAL VESSEL N/A 7/12/2019    Procedure: PTA, PERIPHERAL VESSEL;  Surgeon: Keo Jenkins MD;  Location: [facility] CATH LAB/EP;  Service: Cardiology;  Laterality: N/A;    PERCUTANEOUS TRANSLUMINAL ANGIOPLASTY (PTA) OF PERIPHERAL VESSEL Left 5/20/2022    Procedure: PTA, PERIPHERAL VESSEL;  Surgeon: Keo Jenkins MD;  Location: [facility] CATH LAB/EP;  Service: Cardiology;  Laterality: Left;    PERCUTANEOUS TRANSLUMINAL ANGIOPLASTY (PTA) OF  PERIPHERAL VESSEL Right 8/12/2022    Procedure: PTA, PERIPHERAL VESSEL;  Surgeon: Keo Jenkins MD;  Location: [facility] CATH LAB/EP;  Service: Cardiology;  Laterality: Right;    PERCUTANEOUS TRANSLUMINAL BALLOON ANGIOPLASTY OF CORONARY ARTERY  4/25/2023    Procedure: Angioplasty-coronary;  Surgeon: Keo Jenkins MD;  Location: [facility] CATH LAB/EP;  Service: Cardiology;;    PTCA, SINGLE VESSEL  5/16/2023    Procedure: PTCA, Single Vessel;  Surgeon: Keo Jenkins MD;  Location: [facility] CATH LAB/EP;  Service: Cardiology;;  CX    STENT, DRUG ELUTING, SINGLE VESSEL, CORONARY  4/25/2023    Procedure: Stent, Drug Eluting, Single Vessel, Coronary;  Surgeon: Keo Jenkins MD;  Location: [facility] CATH LAB/EP;  Service: Cardiology;;    STENT, DRUG ELUTING, SINGLE VESSEL, CORONARY  5/16/2023    Procedure: Stent, Drug Eluting, Single Vessel, Coronary;  Surgeon: Keo Jenkins MD;  Location: [facility] CATH LAB/EP;  Service: Cardiology;;  CX    TRANSESOPHAGEAL ECHOCARDIOGRAM WITH POSSIBLE CARDIOVERSION (JOSE W/ POSS CARDIOVERSION) N/A 6/19/2023    Procedure: Transesophageal echo (JOSE) intra-procedure log documentation;  Surgeon: Keo Jenkins MD;  Location: [facility] CATH LAB/EP;  Service: Cardiology;  Laterality: N/A;       Review of patient's allergies indicates:   Allergen Reactions    Ace inhibitors Rash       Medications:  Medications Prior to Admission   Medication Sig    albuterol (PROVENTIL/VENTOLIN HFA) 90 mcg/actuation inhaler INHALE 2 PUFFS INTO THE LUNGS EVERY 6 HOURS AS NEEDED FOR WHEEZING    albuterol-ipratropium (DUO-NEB) 2.5 mg-0.5 mg/3 mL nebulizer solution Take 3 mLs by nebulization every 6 (six) hours as needed for Wheezing or Shortness of Breath (or cough). Rescue    amiodarone (PACERONE) 200 MG Tab Take 1 tablet (200 mg total) by mouth 2 (two) times daily.    apixaban (ELIQUIS) 5 mg Tab Take 1 tablet (5 mg total) by mouth 2 (two) times daily.    carvediloL (COREG) 25 MG tablet Take 1 tablet (25 mg total) by mouth 2 (two)  times daily with meals.    clopidogreL (PLAVIX) 75 mg tablet Take 1 tablet (75 mg total) by mouth once daily.    coenzyme Q10 100 mg capsule Take 100 mg by mouth once daily.    eplerenone (INSPRA) 50 MG Tab Take 1 tablet (50 mg total) by mouth once daily.    isosorbide mononitrate (IMDUR) 60 MG 24 hr tablet Take 1 tablet (60 mg total) by mouth once daily.    levothyroxine (SYNTHROID) 50 MCG tablet Take one tablet PO every morning on an empty stomach and wait at least 1 hour before eating or taking other medications (Patient taking differently: Take 50 mcg by mouth before breakfast.  wait at least 1 hour before eating or taking other medications)    nitroGLYCERIN (NITROSTAT) 0.4 MG SL tablet Place 0.4 mg under the tongue every 5 (five) minutes as needed for Chest pain.    ranolazine (RANEXA) 500 MG Tb12 Take 1 tablet (500 mg total) by mouth 2 (two) times daily.    rosuvastatin (CRESTOR) 40 MG Tab Take 1 tablet (40 mg total) by mouth every evening.    predniSONE (DELTASONE) 20 MG tablet Take 3 pills daily for 3 days, then 2 pills daily for 3 days, then 1 pill daily for 3 days, then 1/2 pill daily for 4 days. Start 10/5/23 (Patient not taking: Reported on 1/27/2024)    sodium bicarbonate 650 MG tablet Take 1 tablet (650 mg total) by mouth once daily. for 30 doses (Patient not taking: Reported on 1/27/2024)     Antibiotics (From admission, onward)      Start     Stop Route Frequency Ordered    01/28/24 0030  cefTRIAXone (ROCEPHIN) 2 g in dextrose 5 % in water (D5W) 100 mL IVPB (MB+)         -- IV Every 12 hours (non-standard times) 01/27/24 1445          Antifungals (From admission, onward)      None          Antivirals (From admission, onward)      None             Immunization History   Administered Date(s) Administered    COVID-19, MRNA, LN-S, PF (MODERNA FULL 0.5 ML DOSE) 05/17/2021, 06/14/2021    Influenza 10/28/2018, 01/31/2020, 09/05/2023    Influenza - Quadrivalent 12/11/2015    Influenza - Quadrivalent - PF  *Preferred* (6 months and older) 2020    Pneumococcal Conjugate - 13 Valent 2020    Pneumococcal Conjugate - 20 Valent 2023    Tdap 2023       Family History       Problem Relation (Age of Onset)    Aneurysm Mother    Cancer Father    Diabetes Sister    Heart disease Mother    Hypertension Mother, Sister    No Known Problems Brother, Son          Social History     Socioeconomic History    Marital status:    Occupational History     Employer: [employer]   Tobacco Use    Smoking status: Former     Current packs/day: 0.00     Types: Cigarettes     Quit date: 1999     Years since quittin.7    Smokeless tobacco: Never   Substance and Sexual Activity    Alcohol use: No     Alcohol/week: 0.0 standard drinks of alcohol    Drug use: No    Sexual activity: Yes     Partners: Female     Social Determinants of Health     Financial Resource Strain: Medium Risk (2023)    Overall Financial Resource Strain (CARDIA)     Difficulty of Paying Living Expenses: Somewhat hard   Food Insecurity: Food Insecurity Present (2023)    Hunger Vital Sign     Worried About Running Out of Food in the Last Year: Sometimes true     Ran Out of Food in the Last Year: Often true   Transportation Needs: No Transportation Needs (2023)    PRAPARE - Transportation     Lack of Transportation (Medical): No     Lack of Transportation (Non-Medical): No   Physical Activity: Insufficiently Active (2023)    Exercise Vital Sign     Days of Exercise per Week: 2 days     Minutes of Exercise per Session: 10 min   Stress: No Stress Concern Present (2023)    [place] [employer] - Occupational Stress Questionnaire     Feeling of Stress : Only a little   Social Connections: Unknown (2023)    Social Connection and Isolation Panel [NHANES]     Frequency of Communication with Friends and Family: More than three times a week     Frequency of Social Gatherings with Friends and  Family: Never     Active Member of Clubs or Organizations: No     Attends Club or Organization Meetings: Never     Marital Status:    Recent Concern: Social Connections - Moderately Isolated (9/8/2023)    Social Connection and Isolation Panel [NHANES]     Frequency of Communication with Friends and Family: Three times a week     Frequency of Social Gatherings with Friends and Family: Never     Attends Scientology Services: Never     Active Member of Clubs or Organizations: No     Attends Club or Organization Meetings: Patient declined     Marital Status:    Housing Stability: Low Risk  (11/28/2023)    Housing Stability Vital Sign     Unable to Pay for Housing in the Last Year: No     Number of Places Lived in the Last Year: 0     Unstable Housing in the Last Year: No   Recent Concern: Housing Stability - High Risk (9/8/2023)    Housing Stability Vital Sign     Unable to Pay for Housing in the Last Year: Yes     Number of Places Lived in the Last Year: 0     Unstable Housing in the Last Year: No     Review of Systems   Constitutional:  Negative for appetite change, chills and fever.   HENT:  Negative for rhinorrhea, sore throat and trouble swallowing.    Eyes:  Negative for pain and visual disturbance.   Respiratory:  Negative for cough and shortness of breath.    Cardiovascular:  Negative for chest pain, palpitations and leg swelling.   Gastrointestinal:  Negative for abdominal pain, constipation, diarrhea, nausea and vomiting.   Genitourinary:  Negative for dysuria and hematuria.   Musculoskeletal:  Negative for gait problem and neck stiffness.   Skin:  Negative for rash and wound.   Neurological:  Negative for tremors, weakness and headaches.   Psychiatric/Behavioral:  Negative for agitation and confusion.      Objective:     Vital Signs (Most Recent):  Temp: 98 °F (36.7 °C) (01/31/24 0738)  Pulse: (!) 55 (01/31/24 1214)  Resp: (!) 23 (01/31/24 1135)  BP: (!) 145/64 (01/31/24 0738)  SpO2: 95 %  (01/31/24 1135) Vital Signs (24h Range):  Temp:  [97.6 °F (36.4 °C)-98.3 °F (36.8 °C)] 98 °F (36.7 °C)  Pulse:  [52-67] 55  Resp:  [18-23] 23  SpO2:  [93 %-96 %] 95 %  BP: (130-147)/(61-69) 145/64     Weight: 83.3 kg (183 lb 10.3 oz)  Body mass index is 26.35 kg/m².    Estimated Creatinine Clearance: 12.4 mL/min (A) (based on SCr of 6.4 mg/dL (H)).     Physical Exam  Constitutional:       General: He is not in acute distress.     Appearance: Normal appearance. He is normal weight. He is not ill-appearing or toxic-appearing.   HENT:      Head: Normocephalic.      Right Ear: External ear normal.      Left Ear: External ear normal.      Nose: Nose normal.      Mouth/Throat:      Pharynx: Oropharynx is clear.   Eyes:      Extraocular Movements: Extraocular movements intact.   Cardiovascular:      Rate and Rhythm: Normal rate and regular rhythm.      Pulses: Normal pulses.   Pulmonary:      Effort: Pulmonary effort is normal. No respiratory distress.      Breath sounds: Normal breath sounds. No wheezing, rhonchi or rales.   Abdominal:      General: Abdomen is flat. There is no distension.      Palpations: Abdomen is soft.      Tenderness: There is no abdominal tenderness. There is no guarding or rebound.   Musculoskeletal:         General: Normal range of motion.      Cervical back: Normal range of motion.      Right lower leg: No edema.      Left lower leg: No edema.   Skin:     General: Skin is warm.      Coloration: Skin is not jaundiced.   Neurological:      General: No focal deficit present.      Mental Status: He is alert and oriented to person, place, and time.      Motor: No weakness.   Psychiatric:         Mood and Affect: Mood normal.         Behavior: Behavior normal.         Thought Content: Thought content normal.          Significant Labs: All pertinent labs within the past 24 hours have been reviewed.    Significant Imaging: I have reviewed all pertinent imaging results/findings within the past 24  hours.

## 2024-01-31 NOTE — ASSESSMENT & PLAN NOTE
Patient with known history of HLD reports being compliant with the following home regimen: Rosuvastatin 40mg QD    Plan:   - Hold statin in setting of elevated transaminitis  - Continue home equivalent Atorvastatin 80 mg QD when appropriate

## 2024-01-31 NOTE — PLAN OF CARE
Chart reviewed / case discussed in am MDR    Per therapy notes today - no further PT/OT needed.  No needs at discharge    wife Karmen       dx:  Viral Syn, BRETT, COPD, fever  acute liver injury and acute on chronic kidney injury   PCP and Nephrology apts requested per Nirali with scheduling      Future Appointments   Date Time Provider Department Center   2/29/2024  7:15 AM LABPREETI LAB [place]   3/5/2024  2:40 PM Analy Encarnacion MD [facility]   3/7/2024 11:00 AM Loly Payne MD [facility]   3/13/2024  9:30 AM APPOINTMENT [facility]PREETI [facility]   3/13/2024 10:00 AM Robby Reddy MD [facility]        01/31/24 1527   Post-Acute Status   Post-Acute Authorization Other   Other Status No Post-Acute Service Needs   Discharge Delays   (pending medical stability)   Discharge Plan   Discharge Plan A Home;Home with family   Discharge Plan B Home;Home with family;Home Health

## 2024-01-31 NOTE — CONSULTS
Biopsy Consult Note  Interventional Radiology    Reason for Consult: kidney biopsy    SUBJECTIVE:     Chief Complaint:  CAP    History of Present Illness:  Domingo Gross is a 63 y.o. male with a PMHx of  HTN, Atrial Fibrillation (sees Dr. Calderón, on Amiodarone, Apixaban), COPD (inhaler PRN), CKD (sees Dr. Payne), Hypothyroidism (Levothyroxine) who was admitted on 1/27 for CAP and BRETT on CKD4. Interventional Radiology has been consulted for image guided non targeted kidney biopsy. Pt has had recent imaging including abdominal US with no acute findings.  The pt has not undergone biopsy in the past. Cr is 6.4 compared to baseline 2.3-2.4.   Pt HDS.  Eliquis on hold for 4 days now.  Plavix given this morning 1/31/24.    Review of Systems   Constitutional:  Negative for chills and fever.   HENT:  Negative for congestion and sore throat.    Eyes:  Negative for blurred vision, double vision and photophobia.   Respiratory:  Positive for cough. Negative for shortness of breath.    Cardiovascular:  Negative for chest pain, palpitations and leg swelling.   Gastrointestinal:  Negative for abdominal pain, diarrhea, nausea and vomiting.   Genitourinary:  Negative for dysuria and urgency.   Musculoskeletal:  Negative for joint pain and myalgias.   Skin:  Negative for itching and rash.   Neurological:  Positive for weakness. Negative for dizziness, sensory change and headaches.   Endo/Heme/Allergies:  Negative for polydipsia. Does not bruise/bleed easily.   Psychiatric/Behavioral:  Negative for depression.        Scheduled Meds:   albuterol-ipratropium  3 mL Nebulization Q4H    carvediloL  25 mg Oral BID WM    cefTRIAXone (Rocephin) IV (PEDS and ADULTS)  2 g Intravenous Q12H    isosorbide mononitrate  60 mg Oral Daily    levothyroxine  75 mcg Oral Before breakfast    sodium bicarbonate  650 mg Oral TID     Continuous Infusions:  PRN Meds:sodium chloride 0.9%, benzonatate, guaiFENesin 100 mg/5 ml, LIDOcaine, melatonin,  nitroGLYCERIN, ondansetron, senna-docusate 8.6-50 mg, sodium chloride 0.9%    Review of patient's allergies indicates:   Allergen Reactions    Ace inhibitors Rash       Past Medical History:   Diagnosis Date    Allergy     Anemia     Anticoagulant long-term use     Arthritis     CKD (chronic kidney disease) stage 4, GFR 15-29 ml/min     COPD (chronic obstructive pulmonary disease) 08/20/2021    Coronary artery disease     Heart attack 10/04/2019    Hematuria     Hemothorax     Hyperlipidemia     Hypertension     Hyperuricemia     Hypocalcemia     Hypokalemia     Hypophosphatemia     Hypothyroidism 3/2/2023    PAD (peripheral artery disease)     Proteinuria     Vitamin D deficiency      Past Surgical History:   Procedure Laterality Date    ABDOMINAL AORTOGRAPHY N/A 5/10/2019    Procedure: AORTOGRAM-ABDOMINAL;  Surgeon: Keo Jenkins MD;  Location: [facility] CATH LAB/EP;  Service: Cardiology;  Laterality: N/A;  RSFA intervention     AORTOGRAPHY WITH SERIALOGRAPHY N/A 12/3/2021    Procedure: AORTOGRAM, WITH SERIALOGRAPHY;  Surgeon: Keo Jenkins MD;  Location: [facility] CATH LAB/EP;  Service: Cardiology;  Laterality: N/A;    AORTOGRAPHY WITH SERIALOGRAPHY N/A 4/1/2022    Procedure: AORTOGRAM, WITH SERIALOGRAPHY;  Surgeon: Keo Jenkins MD;  Location: [facility] CATH LAB/EP;  Service: Cardiology;  Laterality: N/A;    ATHERECTOMY, CORONARY N/A 4/25/2023    Procedure: Atherectomy-coronary;  Surgeon: Keo Jenkins MD;  Location: [facility] CATH LAB/EP;  Service: Cardiology;  Laterality: N/A;    BONE MARROW BIOPSY Right 10/12/2021    Procedure: BIOPSY-BONE MARROW;  Surgeon: Naseem Woods MD;  Location: [facility] OR;  Service: Oncology;  Laterality: Right;    CARDIAC CATHETERIZATION      CATARACT EXTRACTION      CATARACT EXTRACTION W/  INTRAOCULAR LENS IMPLANT Right 6/8/2020    Procedure: EXTRACTION, CATARACT, WITH IOL INSERTION;  Surgeon: Tin Light MD;  Location: [facility] OR;  Service: Ophthalmology;  Laterality: Right;    CATARACT  EXTRACTION W/  INTRAOCULAR LENS IMPLANT Left 7/2/2020    Procedure: EXTRACTION, CATARACT, WITH IOL INSERTION;  Surgeon: Tin Light MD;  Location: [facility];  Service: Ophthalmology;  Laterality: Left;    COLONOSCOPY N/A 1/6/2022    Procedure: COLONOSCOPY;  Surgeon: Kathe Penaloza MD;  Location: [facility] ENDO (2ND FLR);  Service: Endoscopy;  Laterality: N/A;  COVID test at [facility] on 1/3-GT  okay to hold Plavix for 5 days and aspirin per Dr. Becerra  2nd floor due toextensive cardiac history   instructions mailed and my [facility] portal -     CORONARY ANGIOGRAPHY N/A 3/29/2019    Procedure: ANGIOGRAM, CORONARY ARTERY;  Surgeon: Keo Jenkins MD;  Location: [facility] CATH LAB/EP;  Service: Cardiology;  Laterality: N/A;    CORONARY ANGIOGRAPHY Left 10/11/2019    Procedure: ANGIOGRAM, CORONARY ARTERY;  Surgeon: Keo Jeknins MD;  Location: [facility] CATH LAB/EP;  Service: Cardiology;  Laterality: Left;    CORONARY ANGIOGRAPHY N/A 4/10/2023    Procedure: ANGIOGRAM, CORONARY ARTERY;  Surgeon: Keo Jenkins MD;  Location: [facility] CATH LAB/EP;  Service: Cardiology;  Laterality: N/A;    CORONARY ANGIOPLASTY WITH STENT PLACEMENT  03/29/2019    mid and distal RCA    ESOPHAGOGASTRODUODENOSCOPY N/A 1/6/2022    Procedure: EGD (ESOPHAGOGASTRODUODENOSCOPY);  Surgeon: Kathe Penaloza MD;  Location: [facility] (2ND FLR);  Service: Endoscopy;  Laterality: N/A;    EYE SURGERY      INSTANTANEOUS WAVE-FREE RATIO (IFR) N/A 4/25/2023    Procedure: Instantaneous Wave-Free Ratio (IFR);  Surgeon: Keo Jenkins MD;  Location: [facility] CATH LAB/EP;  Service: Cardiology;  Laterality: N/A;    INTRAVASCULAR ULTRASOUND, NON-CORONARY  8/12/2022    Procedure: Intravascular Ultrasound, Non-Coronary;  Surgeon: Keo Jenkins MD;  Location: [facility] CATH LAB/EP;  Service: Cardiology;;    IVUS, CORONARY  4/25/2023    Procedure: IVUS, Coronary;  Surgeon: Keo Jenkins MD;  Location: [facility] CATH LAB/EP;  Service: Cardiology;;    IVUS, CORONARY  5/16/2023    Procedure:  IVUS, Coronary;  Surgeon: Keo Jenkins MD;  Location: [facility] CATH LAB/EP;  Service: Cardiology;;  CX    LEFT HEART CATHETERIZATION N/A 3/29/2019    Procedure: Left heart cath;  Surgeon: Keo Jenkins MD;  Location: [facility] CATH LAB/EP;  Service: Cardiology;  Laterality: N/A;    LEFT HEART CATHETERIZATION Left 10/8/2019    Procedure: Left heart cath;  Surgeon: Keo Jenkins MD;  Location: [facility] CATH LAB/EP;  Service: Cardiology;  Laterality: Left;    LEFT HEART CATHETERIZATION Left 4/10/2023    Procedure: Left heart cath;  Surgeon: Keo Jenkins MD;  Location: [facility] CATH LAB/EP;  Service: Cardiology;  Laterality: Left;    LEFT HEART CATHETERIZATION Left 4/25/2023    Procedure: Left heart cath;  Surgeon: Keo Jenkins MD;  Location: [facility] CATH LAB/EP;  Service: Cardiology;  Laterality: Left;    LEFT HEART CATHETERIZATION Left 5/16/2023    Procedure: Left heart cath;  Surgeon: Keo Jenkins MD;  Location: [facility] CATH LAB/EP;  Service: Cardiology;  Laterality: Left;    PERCUTANEOUS TRANSLUMINAL ANGIOPLASTY (PTA) OF PERIPHERAL VESSEL N/A 7/12/2019    Procedure: PTA, PERIPHERAL VESSEL;  Surgeon: Keo Jenkins MD;  Location: [facility] CATH LAB/EP;  Service: Cardiology;  Laterality: N/A;    PERCUTANEOUS TRANSLUMINAL ANGIOPLASTY (PTA) OF PERIPHERAL VESSEL Left 5/20/2022    Procedure: PTA, PERIPHERAL VESSEL;  Surgeon: Keo Jenkins MD;  Location: [facility] CATH LAB/EP;  Service: Cardiology;  Laterality: Left;    PERCUTANEOUS TRANSLUMINAL ANGIOPLASTY (PTA) OF PERIPHERAL VESSEL Right 8/12/2022    Procedure: PTA, PERIPHERAL VESSEL;  Surgeon: Keo Jenkins MD;  Location: [facility] CATH LAB/EP;  Service: Cardiology;  Laterality: Right;    PERCUTANEOUS TRANSLUMINAL BALLOON ANGIOPLASTY OF CORONARY ARTERY  4/25/2023    Procedure: Angioplasty-coronary;  Surgeon: Keo Jenkins MD;  Location: [facility] CATH LAB/EP;  Service: Cardiology;;    PTCA, SINGLE VESSEL  5/16/2023    Procedure: PTCA, Single Vessel;  Surgeon: Keo Jenkins MD;  Location:  [facility] CATH LAB/EP;  Service: Cardiology;;  CX    STENT, DRUG ELUTING, SINGLE VESSEL, CORONARY  2023    Procedure: Stent, Drug Eluting, Single Vessel, Coronary;  Surgeon: Keo Jenkins MD;  Location: [facility] CATH LAB/EP;  Service: Cardiology;;    STENT, DRUG ELUTING, SINGLE VESSEL, CORONARY  2023    Procedure: Stent, Drug Eluting, Single Vessel, Coronary;  Surgeon: Keo Jenkins MD;  Location: [facility] CATH LAB/EP;  Service: Cardiology;;  CX    TRANSESOPHAGEAL ECHOCARDIOGRAM WITH POSSIBLE CARDIOVERSION (JOSE W/ POSS CARDIOVERSION) N/A 2023    Procedure: Transesophageal echo (JOSE) intra-procedure log documentation;  Surgeon: Keo Jenkins MD;  Location: [facility] CATH LAB/EP;  Service: Cardiology;  Laterality: N/A;     Family History   Problem Relation Age of Onset    Aneurysm Mother     Hypertension Mother     Heart disease Mother     Cancer Father     Diabetes Sister     Hypertension Sister     No Known Problems Brother     No Known Problems Son      Social History     Tobacco Use    Smoking status: Former     Current packs/day: 0.00     Types: Cigarettes     Quit date: 1999     Years since quittin.7    Smokeless tobacco: Never   Substance Use Topics    Alcohol use: No     Alcohol/week: 0.0 standard drinks of alcohol    Drug use: No       OBJECTIVE:     Vital Signs (Most Recent)  Temp: 98 °F (36.7 °C) (24 0738)  Pulse: (!) 56 (24 1135)  Resp: (!) 23 (24 1135)  BP: (!) 145/64 (24 0738)  SpO2: 95 % (24 1135)    Physical Exam:  Physical Exam  Vitals and nursing note reviewed.   Constitutional:       Appearance: Normal appearance.   HENT:      Head: Normocephalic and atraumatic.   Eyes:      General: No scleral icterus.     Extraocular Movements: Extraocular movements intact.   Cardiovascular:      Rate and Rhythm: Bradycardia present.   Pulmonary:      Effort: Pulmonary effort is normal.   Abdominal:      General: Abdomen is flat.   Musculoskeletal:         General: Normal  range of motion.      Cervical back: Normal range of motion.   Skin:     General: Skin is warm and dry.      Coloration: Skin is not jaundiced.   Neurological:      Mental Status: He is alert and oriented to person, place, and time.   Psychiatric:         Mood and Affect: Mood normal.         Behavior: Behavior normal.         Thought Content: Thought content normal.         Judgment: Judgment normal.         Laboratory  I have reviewed all pertinent lab results within the past 24 hours.  CBC:   Recent Labs   Lab 01/31/24  1126   WBC 4.75   RBC 3.05*   HGB 9.0*   HCT 25.5*   PLT 58*   MCV 84   MCH 29.5   MCHC 35.3     CMP:   Recent Labs   Lab 01/31/24  0318   GLU 91   CALCIUM 7.8*   ALBUMIN 1.9*   PROT 5.1*   *   K 3.8   CO2 14*      BUN 64*   CREATININE 6.4*   ALKPHOS 73   *   AST 1,246*   BILITOT 1.0     Coagulation:   Recent Labs   Lab 01/30/24  1131   LABPROT 14.0*   INR 1.3*       ASA/Mallampati  ASA: 3  Mallampati: 2    Imaging:  Limited abdominal US 1/27/24.    ASSESSMENT/PLAN:     Assessment:  63 y.o. male with a PMHx of  HTN, Atrial Fibrillation (sees Dr. Calderón, on Amiodarone, Apixaban), COPD (inhaler PRN), CKD 4 (sees Dr. Payne), Hypothyroidism (Levothyroxine) who has been referred to IR for image guided non targeted kidney biopsy. The procedure was discussed in great detail with the patient including thorough explanations of the potential risks and benefits of image guided non targeted kidney biopsy. Risks include sepsis, severe infection, hemorrhage, and damage to surrounding structures. The patient is a candidate for image guided non targeted kidney biopsy under moderate sedation. Plan discussed with ordering physician.The pt verbalized understanding of the plan and would like to proceed.    Plan:  Will proceed with image guided non targeted kidney biopsy under moderate sedation on 2/6.   Nephrology to fill out [facility] form (contact IR if additional forms needed, Jayla Garcia  RN)  Please keep pt NPO starting at midnight on 2/6.  Placed order.  Anticoagulation history reviewed. Coagulation labs reviewed.  Please hold plavix for 5 days.  Eliquis noted to be on hold (requires 6 doses or 3 day hold).  Allergies reviewed.      Thank you for the consult. Please contact with questions via [employer] secure chat.    Shelby Nunez PA-C  Interventional Radiology

## 2024-01-31 NOTE — PROGRESS NOTES
Nephrology Progress  Note     Consult Requested By: Pawan Garcia,*  Reason for Consult: RBETT on CKD4     SUBJECTIVE:        ?    Review of Systems   Constitutional:  Negative for chills and fever.   HENT:  Negative for congestion and sore throat.    Eyes:  Negative for blurred vision, double vision and photophobia.   Respiratory:  Positive for cough. Negative for shortness of breath.    Cardiovascular:  Negative for chest pain, palpitations and leg swelling.   Gastrointestinal:  Negative for abdominal pain, diarrhea, nausea and vomiting.   Genitourinary:  Negative for dysuria and urgency.   Musculoskeletal:  Negative for joint pain and myalgias.   Skin:  Negative for itching and rash.   Neurological:  Positive for weakness. Negative for dizziness, sensory change and headaches.   Endo/Heme/Allergies:  Negative for polydipsia. Does not bruise/bleed easily.   Psychiatric/Behavioral:  Negative for depression.        Past Medical History:   Diagnosis Date    Allergy     Anemia     Anticoagulant long-term use     Arthritis     CKD (chronic kidney disease) stage 4, GFR 15-29 ml/min     COPD (chronic obstructive pulmonary disease) 08/20/2021    Coronary artery disease     Heart attack 10/04/2019    Hematuria     Hemothorax     Hyperlipidemia     Hypertension     Hyperuricemia     Hypocalcemia     Hypokalemia     Hypophosphatemia     Hypothyroidism 3/2/2023    PAD (peripheral artery disease)     Proteinuria     Vitamin D deficiency           OBJECTIVE:     Vital Signs (Most Recent)  Vitals:    01/31/24 0356 01/31/24 0420 01/31/24 0738 01/31/24 0741   BP: (!) 147/69  (!) 145/64    BP Location: Right arm  Right arm    Patient Position: Lying  Lying    Pulse: (!) 59 (!) 58 61 (!) 59   Resp: 18  18 (!) 22   Temp: 97.6 °F (36.4 °C)  98 °F (36.7 °C)    TempSrc: Oral  Oral    SpO2: 95%  (!) 93% 95%   Weight:       Height:                       Medications:   albuterol-ipratropium  3 mL Nebulization Q4H    azithromycin   500 mg Intravenous Once    carvediloL  25 mg Oral BID WM    cefTRIAXone (Rocephin) IV (PEDS and ADULTS)  2 g Intravenous Q12H    clopidogreL  75 mg Oral Daily    isosorbide mononitrate  60 mg Oral Daily    levothyroxine  75 mcg Oral Before breakfast    sodium bicarbonate  650 mg Oral TID           Physical Exam  Vitals and nursing note reviewed.   Constitutional:       General: He is not in acute distress.     Appearance: He is not diaphoretic.   HENT:      Head: Normocephalic and atraumatic.      Mouth/Throat:      Pharynx: No oropharyngeal exudate.   Eyes:      General: No scleral icterus.     Conjunctiva/sclera: Conjunctivae normal.      Pupils: Pupils are equal, round, and reactive to light.   Cardiovascular:      Rate and Rhythm: Normal rate and regular rhythm.      Heart sounds: Normal heart sounds. No murmur heard.  Pulmonary:      Effort: Pulmonary effort is normal. No respiratory distress.      Breath sounds: Examination of the right-middle field reveals rales. Examination of the left-middle field reveals rales. Examination of the right-lower field reveals rales. Examination of the left-lower field reveals rales. Rales present.   Abdominal:      General: Bowel sounds are normal. There is no distension.      Palpations: Abdomen is soft.      Tenderness: There is no abdominal tenderness.   Musculoskeletal:         General: Normal range of motion.      Cervical back: Normal range of motion and neck supple.      Right lower leg: No edema.      Left lower leg: No edema.   Skin:     General: Skin is warm and dry.      Findings: No erythema.   Neurological:      Mental Status: He is alert and oriented to person, place, and time.      Cranial Nerves: No cranial nerve deficit.   Psychiatric:         Mood and Affect: Affect normal.         Cognition and Memory: Memory normal.         Judgment: Judgment normal.         Laboratory:  Recent Labs   Lab 01/29/24  0309 01/30/24  0230 01/31/24  0318   WBC 5.82 5.29 5.45    HGB 9.8* 9.1* 8.9*   HCT 28.3* 26.5* 25.7*   PLT 76* 68* 67*   MONO 2.6*  0.2* 5.7  0.3 5.9  0.3   EOSINOPHIL 0.2 0.2 1.1       Recent Labs   Lab 01/29/24  0309 01/29/24  1137 01/30/24  0230 01/31/24 0318   * 131* 130* 128*   K 3.8 4.6 4.0 3.8    105 103 101   CO2 20* 18* 16* 14*   BUN 33* 35* 43* 64*   CREATININE 3.9* 4.0* 4.6* 6.4*   CALCIUM 8.0* 7.9* 7.7* 7.8*   PHOS 1.3*  --  3.8 6.4*         Diagnostic Results:  X-Ray: Reviewed  US: Reviewed  Echo: Reviewed  ASSESSMENT/PLAN:     CKD4 - Cr baseline 2.8   Proteinuria - SPEP UPEP repeat  no monoclonals    -- mainly Alb   -- Only TOOTIE + non specific,  other immune work up negative  C3/C4 negative No Hep C or B, HIV.   -- Following with Dr Loly Payne in clinic for his CKD4  -- Renally dose all meds for now   -- ? Hypotension episode at home LFTs improving  but Cr trending  up since admission 3.2=>3.5=>3.9=>4.6 =>6.4    CPK elevated but not to the point to cause BRETT   Decreases Plts - ? Acute illness related   -- EBV IGG +  and elevated CMV IgG +   Recommend consulting ID   Supportive therapy for now no indication for RRT at this time but considering his trajectory may need it   -- Patient is not diabetic with significant proteinuria 4g Blood on UA 27  Will obtain Kidney Biopsy - consult IR     Hyponatremia 128   -- check Direct Na+ on VBG   -- Check S OSm UA osm UA Na+   ? Pseudo due to elevated LFTs and BRETT     2. HTN   controlled   3. Anemia of chronic kidney disease    Recent Labs   Lab 01/29/24  0309 01/30/24  0230 01/31/24 0318   HGB 9.8* 9.1* 8.9*   HCT 28.3* 26.5* 25.7*   PLT 76* 68* 67*         Iron   Lab Results   Component Value Date    IRON 60 03/14/2023    TIBC 299 03/14/2023    FERRITIN 191 03/14/2023       4. MBD (E88.9 M90.80) - PTH at goal   Recent Labs   Lab 01/31/24  0318   CALCIUM 7.8*   PHOS 6.4*       Recent Labs   Lab 01/29/24  0309 01/30/24  0230 01/31/24  0318   MG 2.2 2.0 2.0         Lab Results   Component Value Date     PTH 60.6 08/11/2021    CALCIUM 7.8 (L) 01/31/2024    CAION 1.31 07/14/2020    PHOS 6.4 (H) 01/31/2024     Lab Results   Component Value Date    UOFUGAYN27DV 42 08/11/2021       Lab Results   Component Value Date    CO2 14 (L) 01/31/2024       5. A-fib -  per cardiology   6. Nutrition/Hypoalbuminemia    Recent Labs   Lab 01/28/24  0655 01/29/24  0309 01/30/24  0230 01/30/24  1131 01/31/24  0318   LABPROT 17.2*  --   --  14.0*  --    ALBUMIN  --    < > 2.0*  --  1.9*    < > = values in this interval not displayed.       Nepro with meals TID.       Thank you for the consult, will follow  With any question please call [phone]  Nena Arriola MD    [facility] [place]    ESPERANZA Payne MD,   MD BRANDY Arredondo MD E. V. Harmon, NP    [address]  [place], [address] [phone]

## 2024-01-31 NOTE — ASSESSMENT & PLAN NOTE
Patient with known history of HTN reports being compliant with the following home regimen: Carvedilol 25mg BID, Eplerenone 50mg QD, Imdur 60mg QD. Patient's pressures on admission to the ER are (143-198)/(64-88) - elevated likely secondary to not taking medications before arriving to the hospital.    Plan:  - Continue home Carvedilol and Imdur  - Eplerenone not available in hospital; in setting of his hypokalemia, will use Spironolactone if additional antihypertensives are required

## 2024-01-31 NOTE — ASSESSMENT & PLAN NOTE
Plan:  - Nephrology consulted, appreciate recommendations.  - Fluid restriction.  - Urine and serum osm pending.

## 2024-01-31 NOTE — PT/OT/SLP EVAL
Physical Therapy Evaluation, Treatment and Discharge Note    Patient Name:  Domingo Gross   MRN:  [id number]    Recommendations:     Discharge Recommendations: No Therapy Indicated  Discharge Equipment Recommendations: none   Barriers to discharge: None    Assessment:     Domingo Gross is a 63 y.o. male admitted with a medical diagnosis of CAP (community acquired pneumonia). .  At this time, patient is functioning at their prior level of function and does not require further acute PT services.     PT/OT co session due to anticipated complexity of pt's presentation. Pt's PLOF (independent with no AD). Pt at this time Independent with no AD. Pt has no further skilled acute PT needs. D/c acute PT.     Recent Surgery: * No surgery found *      Plan:     During this hospitalization, patient does not require further acute PT services.  Please re-consult if situation changes.      Subjective     Chief Complaint: no complaints  Patient/Family Comments/goals: to return home  Pain/Comfort:  Pain Rating 1: 0/10  Pain Rating Post-Intervention 1: 0/10    Patients cultural, spiritual, Protestant conflicts given the current situation: no    Living Environment:  Lives with spouse and grandson in 1 story home with 4/5 steps to enter with R.   Prior to admission, patients level of function was Independent with no AD.  Equipment used at home: none.  DME owned (not currently used): none.  Upon discharge, patient will have assistance from family.    Objective:     Communicated with Nurse prior to session.  Patient found HOB elevated with   upon PT entry to room.    General Precautions: Standard,      Orthopedic Precautions:N/A   Braces: N/A  Respiratory Status: Room air    Exams:  Cognitive Exam:  Patient is oriented to Person, Place, Time, and Situation  Sensation:    -       Intact  light/touch to BLE  RLE ROM: WFL  RLE Strength: WFL  LLE ROM: WFL  LLE Strength: WFL    Functional Mobility:  Bed Mobility:     Supine to Sit:  independence  Sit to Supine: independence  Transfers:     Sit to Stand:  independence with no AD  Toilet Transfer: independence with  no AD  using  Step Transfer  Gait: ~150ft with no AD and SBA progress to independent; noted some furniture surfing on wall  Stairs:  Pt ascended/descended 1 flight(s) with left handrail with SBA progress to MOD I.     AM-PAC 6 CLICK MOBILITY  Total Score:23       Treatment and Education:  Pt educated on role of therapy.   Pt states he feels functionally at baseline.   Reports no pain, change in sensation or strength.   Pt has no further skilled acute PT needs.   Pt has some reported SOB post session; however SpO2 96% on RA.   Pt stated he feels at baseline at this time but educated on option of OP PT to increase endurance if he would deem necessary at later time; to speak with PCP if he were to feel it necessary at a later time.     AM-PAC 6 CLICK MOBILITY  Total Score:23     Patient left HOB elevated with all lines intact, call button in reach, and Nurse notified.    GOALS:   Multidisciplinary Problems       Physical Therapy Goals          Problem: Physical Therapy    Goal Priority Disciplines Outcome Goal Variances Interventions   Physical Therapy Goal     PT, PT/OT Adequate for Care Transition                         History:     Past Medical History:   Diagnosis Date    Allergy     Anemia     Anticoagulant long-term use     Arthritis     CKD (chronic kidney disease) stage 4, GFR 15-29 ml/min     COPD (chronic obstructive pulmonary disease) 08/20/2021    Coronary artery disease     Heart attack 10/04/2019    Hematuria     Hemothorax     Hyperlipidemia     Hypertension     Hyperuricemia     Hypocalcemia     Hypokalemia     Hypophosphatemia     Hypothyroidism 3/2/2023    PAD (peripheral artery disease)     Proteinuria     Vitamin D deficiency        Past Surgical History:   Procedure Laterality Date    ABDOMINAL AORTOGRAPHY N/A 5/10/2019    Procedure: AORTOGRAM-ABDOMINAL;  Surgeon:  Keo Jenkins MD;  Location: [facility] CATH LAB/EP;  Service: Cardiology;  Laterality: N/A;  RSFA intervention     AORTOGRAPHY WITH SERIALOGRAPHY N/A 12/3/2021    Procedure: AORTOGRAM, WITH SERIALOGRAPHY;  Surgeon: Keo Jenkins MD;  Location: [facility] CATH LAB/EP;  Service: Cardiology;  Laterality: N/A;    AORTOGRAPHY WITH SERIALOGRAPHY N/A 4/1/2022    Procedure: AORTOGRAM, WITH SERIALOGRAPHY;  Surgeon: Keo Jenkins MD;  Location: [facility] CATH LAB/EP;  Service: Cardiology;  Laterality: N/A;    ATHERECTOMY, CORONARY N/A 4/25/2023    Procedure: Atherectomy-coronary;  Surgeon: Keo Jenkins MD;  Location: [facility] CATH LAB/EP;  Service: Cardiology;  Laterality: N/A;    BONE MARROW BIOPSY Right 10/12/2021    Procedure: BIOPSY-BONE MARROW;  Surgeon: Naseem Woods MD;  Location: [facility] OR;  Service: Oncology;  Laterality: Right;    CARDIAC CATHETERIZATION      CATARACT EXTRACTION      CATARACT EXTRACTION W/  INTRAOCULAR LENS IMPLANT Right 6/8/2020    Procedure: EXTRACTION, CATARACT, WITH IOL INSERTION;  Surgeon: Tin Light MD;  Location: [facility] OR;  Service: Ophthalmology;  Laterality: Right;    CATARACT EXTRACTION W/  INTRAOCULAR LENS IMPLANT Left 7/2/2020    Procedure: EXTRACTION, CATARACT, WITH IOL INSERTION;  Surgeon: Tin Light MD;  Location: [facility] OR;  Service: Ophthalmology;  Laterality: Left;    COLONOSCOPY N/A 1/6/2022    Procedure: COLONOSCOPY;  Surgeon: Kathe Penaloza MD;  Location: [facility] ENDO ([address] [place] [address]);  Service: Endoscopy;  Laterality: N/A;  COVID test at [facility] on 1/3-GT  okay to hold Plavix for 5 days and aspirin per Dr. Becerra  2nd floor due toextensive cardiac history   instructions mailed and my [facility] portal -     CORONARY ANGIOGRAPHY N/A 3/29/2019    Procedure: ANGIOGRAM, CORONARY ARTERY;  Surgeon: Keo Jenkins MD;  Location: [facility] CATH LAB/EP;  Service: Cardiology;  Laterality: N/A;    CORONARY ANGIOGRAPHY Left 10/11/2019    Procedure: ANGIOGRAM, CORONARY ARTERY;  Surgeon: Keo Jenkins MD;   Location: [facility] CATH LAB/EP;  Service: Cardiology;  Laterality: Left;    CORONARY ANGIOGRAPHY N/A 4/10/2023    Procedure: ANGIOGRAM, CORONARY ARTERY;  Surgeon: Keo Jenkins MD;  Location: [facility] CATH LAB/EP;  Service: Cardiology;  Laterality: N/A;    CORONARY ANGIOPLASTY WITH STENT PLACEMENT  03/29/2019    mid and distal RCA    ESOPHAGOGASTRODUODENOSCOPY N/A 1/6/2022    Procedure: EGD (ESOPHAGOGASTRODUODENOSCOPY);  Surgeon: Kathe Penaloza MD;  Location: [address]);  Service: Endoscopy;  Laterality: N/A;    EYE SURGERY      INSTANTANEOUS WAVE-FREE RATIO (IFR) N/A 4/25/2023    Procedure: Instantaneous Wave-Free Ratio (IFR);  Surgeon: Keo Jenkins MD;  Location: [facility] CATH LAB/EP;  Service: Cardiology;  Laterality: N/A;    INTRAVASCULAR ULTRASOUND, NON-CORONARY  8/12/2022    Procedure: Intravascular Ultrasound, Non-Coronary;  Surgeon: Keo Jenkins MD;  Location: [facility] CATH LAB/EP;  Service: Cardiology;;    IVUS, CORONARY  4/25/2023    Procedure: IVUS, Coronary;  Surgeon: Keo eJnkins MD;  Location: [facility] CATH LAB/EP;  Service: Cardiology;;    IVUS, CORONARY  5/16/2023    Procedure: IVUS, Coronary;  Surgeon: Keo Jenkins MD;  Location: [facility] CATH LAB/EP;  Service: Cardiology;;  CX    LEFT HEART CATHETERIZATION N/A 3/29/2019    Procedure: Left heart cath;  Surgeon: Keo Jenkins MD;  Location: [facility] CATH LAB/EP;  Service: Cardiology;  Laterality: N/A;    LEFT HEART CATHETERIZATION Left 10/8/2019    Procedure: Left heart cath;  Surgeon: Keo Jenkins MD;  Location: [facility] CATH LAB/EP;  Service: Cardiology;  Laterality: Left;    LEFT HEART CATHETERIZATION Left 4/10/2023    Procedure: Left heart cath;  Surgeon: Keo Jenkins MD;  Location: [facility] CATH LAB/EP;  Service: Cardiology;  Laterality: Left;    LEFT HEART CATHETERIZATION Left 4/25/2023    Procedure: Left heart cath;  Surgeon: Keo Jenkins MD;  Location: [facility] CATH LAB/EP;  Service: Cardiology;  Laterality: Left;    LEFT HEART CATHETERIZATION Left  5/16/2023    Procedure: Left heart cath;  Surgeon: Keo Jenkins MD;  Location: [facility] CATH LAB/EP;  Service: Cardiology;  Laterality: Left;    PERCUTANEOUS TRANSLUMINAL ANGIOPLASTY (PTA) OF PERIPHERAL VESSEL N/A 7/12/2019    Procedure: PTA, PERIPHERAL VESSEL;  Surgeon: Keo Jenkins MD;  Location: [facility] CATH LAB/EP;  Service: Cardiology;  Laterality: N/A;    PERCUTANEOUS TRANSLUMINAL ANGIOPLASTY (PTA) OF PERIPHERAL VESSEL Left 5/20/2022    Procedure: PTA, PERIPHERAL VESSEL;  Surgeon: Keo Jenkins MD;  Location: [facility] CATH LAB/EP;  Service: Cardiology;  Laterality: Left;    PERCUTANEOUS TRANSLUMINAL ANGIOPLASTY (PTA) OF PERIPHERAL VESSEL Right 8/12/2022    Procedure: PTA, PERIPHERAL VESSEL;  Surgeon: Keo Jenkins MD;  Location: [facility] CATH LAB/EP;  Service: Cardiology;  Laterality: Right;    PERCUTANEOUS TRANSLUMINAL BALLOON ANGIOPLASTY OF CORONARY ARTERY  4/25/2023    Procedure: Angioplasty-coronary;  Surgeon: Keo Jenkins MD;  Location: [facility] CATH LAB/EP;  Service: Cardiology;;    PTCA, SINGLE VESSEL  5/16/2023    Procedure: PTCA, Single Vessel;  Surgeon: Keo Jenkins MD;  Location: [facility] CATH LAB/EP;  Service: Cardiology;;  CX    STENT, DRUG ELUTING, SINGLE VESSEL, CORONARY  4/25/2023    Procedure: Stent, Drug Eluting, Single Vessel, Coronary;  Surgeon: Keo Jenkins MD;  Location: [facility] CATH LAB/EP;  Service: Cardiology;;    STENT, DRUG ELUTING, SINGLE VESSEL, CORONARY  5/16/2023    Procedure: Stent, Drug Eluting, Single Vessel, Coronary;  Surgeon: Keo Jenkins MD;  Location: [facility] CATH LAB/EP;  Service: Cardiology;;  CX    TRANSESOPHAGEAL ECHOCARDIOGRAM WITH POSSIBLE CARDIOVERSION (JOSE W/ POSS CARDIOVERSION) N/A 6/19/2023    Procedure: Transesophageal echo (JOSE) intra-procedure log documentation;  Surgeon: Keo Jenkins MD;  Location: [facility] CATH LAB/EP;  Service: Cardiology;  Laterality: N/A;       Time Tracking:     PT Received On: 01/31/24  PT Start Time: 1441     PT Stop Time: 1504  PT Total Time  (min): 23 min With OT    Billable Minutes: Evaluation 10 and Gait Training 10      01/31/2024

## 2024-01-31 NOTE — ASSESSMENT & PLAN NOTE
EBV IgM and DNA are negative  these serologies likely represent previous infection    Plan:  - ID consulted, appreciate recommendations.

## 2024-01-31 NOTE — ASSESSMENT & PLAN NOTE
Patient with documented diagnosis of Atrial Fibrillation per notes by Cardiologist Dmitriy Chavarria MD reports being compliant with the following home regimen: Apixaban 5mg BID, Amiodarone. EKG taken during admission reveals Aflutter/A Fib    Plan:  - [id number] Score: 1  - Will hold Amiodarone in setting of transaminitis.

## 2024-01-31 NOTE — ASSESSMENT & PLAN NOTE
"Patient presented with "chest hurts", cough, decreased appetite. Temp 103 On RA, sating 94-96%. WBC 6.15, Lactic acid 1.2, Procal 0.28.  CXR: Increased lung opacities, concerning for interstitial edema and/or pneumonitis.  Short-term imaging follow-up could be performed to ensure resolution.    Plan:  Blood cultures pending (NGTD)  Continue Ceftriaxone + Azithromycin; broaden if necessary  "

## 2024-01-31 NOTE — ASSESSMENT & PLAN NOTE
63-year-old man with PMHx of HTN, Atrial Fibrillation (sees Dr. Calderón, on Amiodarone, Apixaban), COPD (inhaler PRN), CKD (sees Dr. Payne), Hypothyroidism (Levothyroxine) presents via ambulance to [facility] on 1/27/24 and has been treated for CAP. ID is consulted due to EBV IgG + and CMV IgG +    -viral testing was done due to elevated LFTs and Cr  -LFTs are already improving which wouldn't be expected in viral hepatitis from EBV or CMV  -EBV IgM and DNA are negative  -these serologies likely represent previous infection  -would check quantitative CMV DNA to assess for acute infection  -await kidney biopsy  -treat CAP per primary team, please consult for this if needed  -ID will sign off , please call back if CMV testing or biopsy are concerning for infection

## 2024-02-01 LAB
ABO + RH BLD: NORMAL
ALBUMIN SERPL BCP-MCNC: 1.9 G/DL (ref 3.5–5.2)
ALBUMIN/CREAT UR: 1125 UG/MG (ref 0–30)
ALP SERPL-CCNC: 78 U/L (ref 55–135)
ALT SERPL W/O P-5'-P-CCNC: 673 U/L (ref 10–44)
ANION GAP SERPL CALC-SCNC: 18 MMOL/L (ref 8–16)
AST SERPL-CCNC: 728 U/L (ref 10–40)
BACTERIA BLD CULT: NORMAL
BACTERIA BLD CULT: NORMAL
BASOPHILS # BLD AUTO: 0 K/UL (ref 0–0.2)
BASOPHILS NFR BLD: 0 % (ref 0–1.9)
BILIRUB SERPL-MCNC: 0.7 MG/DL (ref 0.1–1)
BLD GP AB SCN CELLS X3 SERPL QL: NORMAL
BUN SERPL-MCNC: 79 MG/DL (ref 8–23)
C3 SERPL-MCNC: 70 MG/DL (ref 50–180)
C4 SERPL-MCNC: 14 MG/DL (ref 11–44)
CALCIUM SERPL-MCNC: 7.6 MG/DL (ref 8.7–10.5)
CHLORIDE SERPL-SCNC: 97 MMOL/L (ref 95–110)
CLINICAL BIOCHEMIST REVIEW: NORMAL
CO2 SERPL-SCNC: 12 MMOL/L (ref 23–29)
CREAT SERPL-MCNC: 7.6 MG/DL (ref 0.5–1.4)
CREAT UR-MCNC: 65.6 MG/DL (ref 23–375)
CREAT UR-MCNC: 65.6 MG/DL (ref 23–375)
CRP SERPL-MCNC: 81.3 MG/L (ref 0–8.2)
DIFFERENTIAL METHOD BLD: ABNORMAL
EOSINOPHIL # BLD AUTO: 0.1 K/UL (ref 0–0.5)
EOSINOPHIL NFR BLD: 2.4 % (ref 0–8)
ERYTHROCYTE [DISTWIDTH] IN BLOOD BY AUTOMATED COUNT: 12.4 % (ref 11.5–14.5)
ERYTHROCYTE [SEDIMENTATION RATE] IN BLOOD BY PHOTOMETRIC METHOD: 82 MM/HR (ref 0–23)
EST. GFR  (NO RACE VARIABLE): 7 ML/MIN/1.73 M^2
GLUCOSE SERPL-MCNC: 109 MG/DL (ref 70–110)
HCT VFR BLD AUTO: 24 % (ref 40–54)
HGB BLD-MCNC: 8.2 G/DL (ref 14–18)
IMM GRANULOCYTES # BLD AUTO: 0.02 K/UL (ref 0–0.04)
IMM GRANULOCYTES NFR BLD AUTO: 0.6 % (ref 0–0.5)
INR PPP: 1 (ref 0.8–1.2)
LYMPHOCYTES # BLD AUTO: 0.3 K/UL (ref 1–4.8)
LYMPHOCYTES NFR BLD: 8.6 % (ref 18–48)
MAGNESIUM SERPL-MCNC: 2.2 MG/DL (ref 1.6–2.6)
MCH RBC QN AUTO: 28.9 PG (ref 27–31)
MCHC RBC AUTO-ENTMCNC: 34.2 G/DL (ref 32–36)
MCV RBC AUTO: 85 FL (ref 82–98)
MICROALBUMIN UR DL<=1MG/L-MCNC: 738 UG/ML
MONOCYTES # BLD AUTO: 0.2 K/UL (ref 0.3–1)
MONOCYTES NFR BLD: 5.5 % (ref 4–15)
NEUTROPHILS # BLD AUTO: 2.7 K/UL (ref 1.8–7.7)
NEUTROPHILS NFR BLD: 82.9 % (ref 38–73)
NRBC BLD-RTO: 0 /100 WBC
PATH REV BLD -IMP: NORMAL
PHOSPHATE SERPL-MCNC: 7.1 MG/DL (ref 2.7–4.5)
PLATELET # BLD AUTO: 64 K/UL (ref 150–450)
PLPETH BLD-MCNC: <10 NG/ML
PMV BLD AUTO: 12.3 FL (ref 9.2–12.9)
POPETH BLD-MCNC: <10 NG/ML
POTASSIUM SERPL-SCNC: 3.6 MMOL/L (ref 3.5–5.1)
PROT SERPL-MCNC: 5.1 G/DL (ref 6–8.4)
PROT UR-MCNC: 137 MG/DL (ref 0–15)
PROT/CREAT UR: 2.09 MG/G{CREAT} (ref 0–0.2)
PROTHROMBIN TIME: 11.4 SEC (ref 9–12.5)
RBC # BLD AUTO: 2.84 M/UL (ref 4.6–6.2)
RHEUMATOID FACT SERPL-ACNC: <13 IU/ML (ref 0–15)
RHEUMATOID FACT SERPL-ACNC: <13 IU/ML (ref 0–15)
SODIUM SERPL-SCNC: 127 MMOL/L (ref 136–145)
SPECIMEN OUTDATE: NORMAL
WBC # BLD AUTO: 3.27 K/UL (ref 3.9–12.7)

## 2024-02-01 PROCEDURE — 86225 DNA ANTIBODY NATIVE: CPT | Performed by: STUDENT IN AN ORGANIZED HEALTH CARE EDUCATION/TRAINING PROGRAM

## 2024-02-01 PROCEDURE — 94640 AIRWAY INHALATION TREATMENT: CPT

## 2024-02-01 PROCEDURE — 36415 COLL VENOUS BLD VENIPUNCTURE: CPT | Performed by: PHYSICIAN ASSISTANT

## 2024-02-01 PROCEDURE — 36415 COLL VENOUS BLD VENIPUNCTURE: CPT | Mod: XB | Performed by: STUDENT IN AN ORGANIZED HEALTH CARE EDUCATION/TRAINING PROGRAM

## 2024-02-01 PROCEDURE — 85610 PROTHROMBIN TIME: CPT | Performed by: PHYSICIAN ASSISTANT

## 2024-02-01 PROCEDURE — 86780 TREPONEMA PALLIDUM: CPT | Performed by: STUDENT IN AN ORGANIZED HEALTH CARE EDUCATION/TRAINING PROGRAM

## 2024-02-01 PROCEDURE — 84165 PROTEIN E-PHORESIS SERUM: CPT | Performed by: STUDENT IN AN ORGANIZED HEALTH CARE EDUCATION/TRAINING PROGRAM

## 2024-02-01 PROCEDURE — 11000001 HC ACUTE MED/SURG PRIVATE ROOM

## 2024-02-01 PROCEDURE — 25000003 PHARM REV CODE 250: Performed by: STUDENT IN AN ORGANIZED HEALTH CARE EDUCATION/TRAINING PROGRAM

## 2024-02-01 PROCEDURE — 86334 IMMUNOFIX E-PHORESIS SERUM: CPT | Performed by: STUDENT IN AN ORGANIZED HEALTH CARE EDUCATION/TRAINING PROGRAM

## 2024-02-01 PROCEDURE — 82570 ASSAY OF URINE CREATININE: CPT | Performed by: STUDENT IN AN ORGANIZED HEALTH CARE EDUCATION/TRAINING PROGRAM

## 2024-02-01 PROCEDURE — 25000003 PHARM REV CODE 250

## 2024-02-01 PROCEDURE — 83516 IMMUNOASSAY NONANTIBODY: CPT | Mod: 59 | Performed by: STUDENT IN AN ORGANIZED HEALTH CARE EDUCATION/TRAINING PROGRAM

## 2024-02-01 PROCEDURE — 86038 ANTINUCLEAR ANTIBODIES: CPT | Performed by: STUDENT IN AN ORGANIZED HEALTH CARE EDUCATION/TRAINING PROGRAM

## 2024-02-01 PROCEDURE — 94761 N-INVAS EAR/PLS OXIMETRY MLT: CPT

## 2024-02-01 PROCEDURE — 85652 RBC SED RATE AUTOMATED: CPT | Performed by: STUDENT IN AN ORGANIZED HEALTH CARE EDUCATION/TRAINING PROGRAM

## 2024-02-01 PROCEDURE — 83516 IMMUNOASSAY NONANTIBODY: CPT | Performed by: STUDENT IN AN ORGANIZED HEALTH CARE EDUCATION/TRAINING PROGRAM

## 2024-02-01 PROCEDURE — 86431 RHEUMATOID FACTOR QUANT: CPT | Performed by: STUDENT IN AN ORGANIZED HEALTH CARE EDUCATION/TRAINING PROGRAM

## 2024-02-01 PROCEDURE — 83735 ASSAY OF MAGNESIUM: CPT

## 2024-02-01 PROCEDURE — 86160 COMPLEMENT ANTIGEN: CPT | Performed by: STUDENT IN AN ORGANIZED HEALTH CARE EDUCATION/TRAINING PROGRAM

## 2024-02-01 PROCEDURE — 86036 ANCA SCREEN EACH ANTIBODY: CPT | Performed by: STUDENT IN AN ORGANIZED HEALTH CARE EDUCATION/TRAINING PROGRAM

## 2024-02-01 PROCEDURE — 25000242 PHARM REV CODE 250 ALT 637 W/ HCPCS

## 2024-02-01 PROCEDURE — 86901 BLOOD TYPING SEROLOGIC RH(D): CPT

## 2024-02-01 PROCEDURE — 82043 UR ALBUMIN QUANTITATIVE: CPT | Performed by: STUDENT IN AN ORGANIZED HEALTH CARE EDUCATION/TRAINING PROGRAM

## 2024-02-01 PROCEDURE — 86160 COMPLEMENT ANTIGEN: CPT | Mod: 59 | Performed by: STUDENT IN AN ORGANIZED HEALTH CARE EDUCATION/TRAINING PROGRAM

## 2024-02-01 PROCEDURE — 86235 NUCLEAR ANTIGEN ANTIBODY: CPT | Performed by: STUDENT IN AN ORGANIZED HEALTH CARE EDUCATION/TRAINING PROGRAM

## 2024-02-01 PROCEDURE — 82595 ASSAY OF CRYOGLOBULIN: CPT | Performed by: STUDENT IN AN ORGANIZED HEALTH CARE EDUCATION/TRAINING PROGRAM

## 2024-02-01 PROCEDURE — 85025 COMPLETE CBC W/AUTO DIFF WBC: CPT

## 2024-02-01 PROCEDURE — 86140 C-REACTIVE PROTEIN: CPT | Performed by: STUDENT IN AN ORGANIZED HEALTH CARE EDUCATION/TRAINING PROGRAM

## 2024-02-01 PROCEDURE — 83521 IG LIGHT CHAINS FREE EACH: CPT | Performed by: STUDENT IN AN ORGANIZED HEALTH CARE EDUCATION/TRAINING PROGRAM

## 2024-02-01 PROCEDURE — 63600175 PHARM REV CODE 636 W HCPCS

## 2024-02-01 PROCEDURE — 80053 COMPREHEN METABOLIC PANEL: CPT

## 2024-02-01 PROCEDURE — 84100 ASSAY OF PHOSPHORUS: CPT

## 2024-02-01 PROCEDURE — 84165 PROTEIN E-PHORESIS SERUM: CPT | Mod: 26,,, | Performed by: PATHOLOGY

## 2024-02-01 PROCEDURE — 86334 IMMUNOFIX E-PHORESIS SERUM: CPT | Mod: 26,,, | Performed by: PATHOLOGY

## 2024-02-01 RX ORDER — HYDROXYZINE PAMOATE 25 MG/1
25 CAPSULE ORAL NIGHTLY PRN
Status: DISCONTINUED | OUTPATIENT
Start: 2024-02-01 | End: 2024-02-09 | Stop reason: HOSPADM

## 2024-02-01 RX ADMIN — SODIUM BICARBONATE 650 MG TABLET 650 MG: at 09:02

## 2024-02-01 RX ADMIN — IPRATROPIUM BROMIDE AND ALBUTEROL SULFATE 3 ML: .5; 3 SOLUTION RESPIRATORY (INHALATION) at 03:02

## 2024-02-01 RX ADMIN — HYDROXYZINE PAMOATE 25 MG: 25 CAPSULE ORAL at 09:02

## 2024-02-01 RX ADMIN — GUAIFENESIN 200 MG: 200 SOLUTION ORAL at 12:02

## 2024-02-01 RX ADMIN — IPRATROPIUM BROMIDE AND ALBUTEROL SULFATE 3 ML: .5; 3 SOLUTION RESPIRATORY (INHALATION) at 07:02

## 2024-02-01 RX ADMIN — ISOSORBIDE MONONITRATE 60 MG: 30 TABLET, EXTENDED RELEASE ORAL at 09:02

## 2024-02-01 RX ADMIN — IPRATROPIUM BROMIDE AND ALBUTEROL SULFATE 3 ML: .5; 3 SOLUTION RESPIRATORY (INHALATION) at 11:02

## 2024-02-01 RX ADMIN — SODIUM BICARBONATE 650 MG TABLET 650 MG: at 03:02

## 2024-02-01 RX ADMIN — HYDROXYZINE PAMOATE 25 MG: 25 CAPSULE ORAL at 01:02

## 2024-02-01 RX ADMIN — CEFTRIAXONE SODIUM 2 G: 2 INJECTION, POWDER, FOR SOLUTION INTRAMUSCULAR; INTRAVENOUS at 12:02

## 2024-02-01 RX ADMIN — LEVOTHYROXINE SODIUM 75 MCG: 75 TABLET ORAL at 05:02

## 2024-02-01 NOTE — PLAN OF CARE
Problem: Adult Inpatient Plan of Care  Goal: Plan of Care Review  Outcome: Ongoing, Progressing  Goal: Patient-Specific Goal (Individualized)  Outcome: Ongoing, Progressing  Goal: Absence of Hospital-Acquired Illness or Injury  Outcome: Ongoing, Progressing  Goal: Optimal Comfort and Wellbeing  Outcome: Ongoing, Progressing  Goal: Readiness for Transition of Care  Outcome: Ongoing, Progressing     Problem: Fluid and Electrolyte Imbalance (Acute Kidney Injury/Impairment)  Goal: Fluid and Electrolyte Balance  Outcome: Ongoing, Progressing     Problem: Renal Function Impairment (Acute Kidney Injury/Impairment)  Goal: Effective Renal Function  Outcome: Ongoing, Progressing     Problem: Fluid Imbalance (Pneumonia)  Goal: Fluid Balance  Outcome: Ongoing, Progressing     Problem: Respiratory Compromise (Pneumonia)  Goal: Effective Oxygenation and Ventilation  Outcome: Ongoing, Progressing     Problem: Fall Injury Risk  Goal: Absence of Fall and Fall-Related Injury  Outcome: Ongoing, Progressing

## 2024-02-01 NOTE — PROGRESS NOTES
Chart reviewed / case discussed in am MDR   CM rounded on pt - sleeping at this time.    Pt undergoing Nephrology workup   - pt for kidney bx Mon per MD communication - Secure Chat   Per therapy - no therapy indicated / no dme recc         Future Appointments   Date Time Provider Department Center   2/29/2024  7:15 AM LAB[facility]ETI LAB [place]   3/5/2024  2:40 PM Analy Encarnacion MD [facility]   3/7/2024 11:00 AM Loly Payne MD [facility]   3/13/2024  9:30 AM APPOINTMENT [facility]PREETI [facility] LAB [facility]   3/13/2024 10:00 AM Robby Reddy MD [facility] HEM [facility]      02/01/24 2138   Post-Acute Status   Post-Acute Authorization Other   Discharge Delays None known at this time   Discharge Plan   Discharge Plan A Home;Home with family   Discharge Plan B Home;Home with family;Home Health

## 2024-02-01 NOTE — PROGRESS NOTES
"[facility] Medicine  Progress Note    Patient Name: Domingo Gross  MRN: [id number]  Patient Class: IP- Inpatient   Admission Date: 1/27/2024  Length of Stay: 5 days  Attending Physician: Pawan Garcia,*  Primary Care Provider: Analy Encarnacion MD        Subjective:     Principal Problem:CAP (community acquired pneumonia)        HPI:  63-year-old man with PMHx of HTN, Atrial Fibrillation (sees Dr. Calderón, on Amiodarone, Apixaban), COPD (inhaler PRN), CKD (sees Dr. Payne), Hypothyroidism (Levothyroxine) presents via ambulance to [facility] on 1/27/24 for 1-day history of chest pain, cough, irregular breathing. History was collected from patient and wife, who is at bedside.    Patient reports experiencing chest pain, cough, and decreased appetite that started last night. Reports a fever of 102, did not take Tylenol. This morning, wife noticed that he was breathing "funny" and called the ambulance. Patient's boss recently tested positive for Flu/COVID. Patient emphasized that lack of food intake was due to decreased appetite and denied emesis and nausea.    Currently patient denies any chest pain or difficulty breathing. He reports taking potassium at home & being compliant with all medications. He is a former smoker and denies drinking alcohol.    In the ER, vitals were as follows - Temp 99.8 -> 102.3, HR 68, /87, O2 94%. Labs were significant for K 2.4, Magnesium 1.3, BUN/Cr 21/3.2 (baseline 2.8), AST//302, Alk phos 51 WBC 6.15, Procal 0.28. Patient was given 40mEq of potassium PO, 10mEq of KCl and admitted to [facility] for management/further workup of of Hypokalemia, BRETT, PNA, Transaminitis.    Overview/Hospital Course:  No notes on file    Interval History: No adverse events overnight. LFTs continue to improve. Na, BUN/Cr continue to worse. Patient reports asymptomatic at this time.    Review of Systems   Constitutional:  Negative for appetite change, " chills and fever.   HENT:  Negative for rhinorrhea, sore throat and trouble swallowing.    Eyes:  Negative for pain and visual disturbance.   Respiratory:  Negative for cough and shortness of breath.    Cardiovascular:  Negative for chest pain, palpitations and leg swelling.   Gastrointestinal:  Negative for abdominal pain, constipation, diarrhea, nausea and vomiting.   Genitourinary:  Negative for dysuria and hematuria.   Musculoskeletal:  Negative for gait problem and neck stiffness.   Skin:  Negative for rash and wound.   Neurological:  Negative for tremors, weakness and headaches.   Psychiatric/Behavioral:  Negative for agitation and confusion.      Objective:     Vital Signs (Most Recent):  Temp: 98 °F (36.7 °C) (01/31/24 0738)  Pulse: (!) 55 (01/31/24 1214)  Resp: (!) 23 (01/31/24 1135)  BP: (!) 145/64 (01/31/24 0738)  SpO2: 95 % (01/31/24 1135) Vital Signs (24h Range):  Temp:  [97.6 °F (36.4 °C)-98.3 °F (36.8 °C)] 98 °F (36.7 °C)  Pulse:  [52-67] 55  Resp:  [18-23] 23  SpO2:  [93 %-96 %] 95 %  BP: (130-147)/(61-69) 145/64     Weight: 83.3 kg (183 lb 10.3 oz)  Body mass index is 26.35 kg/m².    Intake/Output Summary (Last 24 hours) at 1/31/2024 1418  Last data filed at 1/31/2024 0552  Gross per 24 hour   Intake 400 ml   Output 400 ml   Net 0 ml         Physical Exam  Vitals and nursing note reviewed.   Constitutional:       General: He is not in acute distress.     Appearance: Normal appearance. He is normal weight. He is not ill-appearing or toxic-appearing.   HENT:      Head: Normocephalic.      Right Ear: External ear normal.      Left Ear: External ear normal.      Nose: Nose normal.      Mouth/Throat:      Pharynx: Oropharynx is clear.   Eyes:      Extraocular Movements: Extraocular movements intact.   Cardiovascular:      Rate and Rhythm: Normal rate and regular rhythm.      Pulses: Normal pulses.   Pulmonary:      Effort: Pulmonary effort is normal. No respiratory distress.      Breath sounds: Normal  "breath sounds. No wheezing, rhonchi or rales.   Abdominal:      General: Abdomen is flat. There is no distension.      Palpations: Abdomen is soft.      Tenderness: There is no abdominal tenderness. There is no guarding or rebound.   Musculoskeletal:         General: Normal range of motion.      Cervical back: Normal range of motion.      Right lower leg: No edema.      Left lower leg: No edema.   Skin:     General: Skin is warm.      Coloration: Skin is not jaundiced.   Neurological:      General: No focal deficit present.      Mental Status: He is alert and oriented to person, place, and time.      Motor: No weakness.   Psychiatric:         Mood and Affect: Mood normal.         Behavior: Behavior normal.         Thought Content: Thought content normal.             Significant Labs: All pertinent labs within the past 24 hours have been reviewed.  ABGs:   Recent Labs   Lab 01/31/24  1129   PH 7.236*   PCO2 39.9   HCO3 16.9*   POCSATURATED 67.0*   PO2 40.8     CBC:   Recent Labs   Lab 01/30/24  0230 01/31/24  0318 01/31/24  1126   WBC 5.29 5.45 4.75   HGB 9.1* 8.9* 9.0*   HCT 26.5* 25.7* 25.5*   PLT 68* 67* 58*     CMP:   Recent Labs   Lab 01/30/24  0230 01/31/24 0318   * 128*   K 4.0 3.8    101   CO2 16* 14*   GLU 94 91   BUN 43* 64*   CREATININE 4.6* 6.4*   CALCIUM 7.7* 7.8*   PROT 5.0* 5.1*   ALBUMIN 2.0* 1.9*   BILITOT 1.5* 1.0   ALKPHOS 69 73   AST 2,162* 1,246*   ALT 1,333* 926*   ANIONGAP 11 13       Significant Imaging: I have reviewed all pertinent imaging results/findings within the past 24 hours.      Assessment/Plan:      * CAP (community acquired pneumonia)  Patient presented with "chest hurts", cough, decreased appetite. Temp 103 On RA, sating 94-96%. WBC 6.15, Lactic acid 1.2, Procal 0.28.  CXR: Increased lung opacities, concerning for interstitial edema and/or pneumonitis.  Short-term imaging follow-up could be performed to ensure resolution.    Plan:  Blood cultures pending " (NGTD)  Completed course of IV Ceftriaxone + Azithromycin    Hyponatremia  Plan:  - Nephrology consulted, appreciate recommendations.  - Fluid restriction.    CMV (cytomegalovirus infection)  Plan:  - Quantitative CMV DNA negative.    EBV infection  EBV IgM and DNA are negative  these serologies likely represent previous infection    Plan:  - ID consulted, appreciate recommendations.      Acute liver failure without hepatic coma  Patient presents with AST/ALT of 481/302, (24/19 5 months ago). Denies drinking alcohol.    Plan:  Will hold PRN Tylenol  Abdominal ultrasound with no acute abnormalities  Hepatitis panel negative  Additional work-up labs pending.    Hypothyroidism  Patient with reported TSH 5 months ago 13.669    Plan:  Repeat TSH elevated, T4 normal  Increased home levothyroxine    Persistent atrial fibrillation  Patient with documented diagnosis of Atrial Fibrillation per notes by Cardiologist Dmitriy Chavarria MD reports being compliant with the following home regimen: Apixaban 5mg BID, Amiodarone. EKG taken during admission reveals Aflutter/A Fib    Plan:  - [id number] Score: 1  - Will hold Amiodarone in setting of transaminitis.    BRETT (acute kidney injury)  W/ Baseline CKD    Plan:  - Urine Studies suggesting intrinsic etiology (FeNa 3.9%)  - Strict I&O  - Avoid nephrotoxins and renally dose meds  - Nephrology consulted, appreciate recommendations.  - Plan for renal biopsy Mon 2/6  - IR consulted, appreciate recommendations.   - Remaining plan as in hyponatremia    Hyperlipidemia  Patient with known history of HLD reports being compliant with the following home regimen: Rosuvastatin 40mg QD    Plan:   - Hold statin in setting of elevated transaminitis  - Continue home equivalent Atorvastatin 80 mg QD when appropriate    HTN (hypertension)  Patient with known history of HTN reports being compliant with the following home regimen: Carvedilol 25mg BID, Eplerenone 50mg QD, Imdur 60mg QD. Patient's pressures  on admission to the ER are (143-198)/(64-88) - elevated likely secondary to not taking medications before arriving to the hospital.    Plan:  - Continue home Carvedilol and Imdur  - Eplerenone not available in hospital; in setting of his hypokalemia, will use Spironolactone if additional antihypertensives are required      VTE Risk Mitigation (From admission, onward)           Ordered     IP VTE HIGH RISK PATIENT  Once         01/27/24 1240     Place sequential compression device  Until discontinued         01/27/24 1240                    Discharge Planning   FLACO:      Code Status: Full Code   Is the patient medically ready for discharge?:     Reason for patient still in hospital (select all that apply): Laboratory test, Treatment, and Consult recommendations  Discharge Plan A: Home, Home with family   Discharge Delays:  (pending medical stability)    ________________________  Jude Sanford MD  [facility] PGY-2

## 2024-02-01 NOTE — ASSESSMENT & PLAN NOTE
W/ Baseline CKD    Plan:  - Urine Studies suggesting intrinsic etiology (FeNa 3.9%)  - Strict I&O  - Avoid nephrotoxins and renally dose meds  - Nephrology consulted, appreciate recommendations.  - Plan for renal biopsy Mon 2/6  - IR consulted, appreciate recommendations.   - Remaining plan as in hyponatremia

## 2024-02-01 NOTE — ASSESSMENT & PLAN NOTE
Patient presents with AST/ALT of 481/302, (24/19 5 months ago). Denies drinking alcohol.    Plan:  Will hold PRN Tylenol  Abdominal ultrasound with no acute abnormalities  Hepatitis panel negative  Additional work-up labs pending.

## 2024-02-01 NOTE — PROGRESS NOTES
Nephrology Progress  Note     Consult Requested By: Pawan Garcia,*  Reason for Consult: BRETT on CKD4     SUBJECTIVE:        ?    Review of Systems   Constitutional:  Negative for chills and fever.   HENT:  Negative for congestion and sore throat.    Eyes:  Negative for blurred vision, double vision and photophobia.   Respiratory:  Positive for cough. Negative for shortness of breath.    Cardiovascular:  Negative for chest pain, palpitations and leg swelling.   Gastrointestinal:  Negative for abdominal pain, diarrhea, nausea and vomiting.   Genitourinary:  Negative for dysuria and urgency.   Musculoskeletal:  Negative for joint pain and myalgias.   Skin:  Negative for itching and rash.   Neurological:  Positive for weakness. Negative for dizziness, sensory change and headaches.   Endo/Heme/Allergies:  Negative for polydipsia. Does not bruise/bleed easily.   Psychiatric/Behavioral:  Negative for depression.        Past Medical History:   Diagnosis Date    Allergy     Anemia     Anticoagulant long-term use     Arthritis     CKD (chronic kidney disease) stage 4, GFR 15-29 ml/min     COPD (chronic obstructive pulmonary disease) 08/20/2021    Coronary artery disease     Heart attack 10/04/2019    Hematuria     Hemothorax     Hyperlipidemia     Hypertension     Hyperuricemia     Hypocalcemia     Hypokalemia     Hypophosphatemia     Hypothyroidism 3/2/2023    PAD (peripheral artery disease)     Proteinuria     Vitamin D deficiency           OBJECTIVE:     Vital Signs (Most Recent)  Vitals:    02/01/24 0600 02/01/24 0739 02/01/24 0755 02/01/24 1130   BP:   (!) 148/67 (!) 141/65   BP Location:   Right arm Right arm   Patient Position:   Lying Lying   Pulse:  (!) 54 (!) 54 (!) 54   Resp:  18 18 (!) 21   Temp:   98.3 °F (36.8 °C) 97.6 °F (36.4 °C)   TempSrc:   Oral Oral   SpO2:  95% 99% 98%   Weight: 83.1 kg (183 lb 3.2 oz)      Height:                       Medications:   albuterol-ipratropium  3 mL Nebulization Q4H     carvediloL  25 mg Oral BID WM    isosorbide mononitrate  60 mg Oral Daily    levothyroxine  75 mcg Oral Before breakfast    sodium bicarbonate  650 mg Oral TID           Physical Exam  Vitals and nursing note reviewed.   Constitutional:       General: He is not in acute distress.     Appearance: He is not diaphoretic.   HENT:      Head: Normocephalic and atraumatic.      Mouth/Throat:      Pharynx: No oropharyngeal exudate.   Eyes:      General: No scleral icterus.     Conjunctiva/sclera: Conjunctivae normal.      Pupils: Pupils are equal, round, and reactive to light.   Cardiovascular:      Rate and Rhythm: Normal rate and regular rhythm.      Heart sounds: Normal heart sounds. No murmur heard.  Pulmonary:      Effort: Pulmonary effort is normal. No respiratory distress.      Breath sounds: Examination of the right-middle field reveals rales. Examination of the left-middle field reveals rales. Examination of the right-lower field reveals rales. Examination of the left-lower field reveals rales. Rales present.   Abdominal:      General: Bowel sounds are normal. There is no distension.      Palpations: Abdomen is soft.      Tenderness: There is no abdominal tenderness.   Musculoskeletal:         General: Normal range of motion.      Cervical back: Normal range of motion and neck supple.      Right lower leg: No edema.      Left lower leg: No edema.   Skin:     General: Skin is warm and dry.      Findings: No erythema.   Neurological:      Mental Status: He is alert and oriented to person, place, and time.      Cranial Nerves: No cranial nerve deficit.   Psychiatric:         Mood and Affect: Affect normal.         Cognition and Memory: Memory normal.         Judgment: Judgment normal.         Laboratory:  Recent Labs   Lab 01/31/24  0318 01/31/24  1126 02/01/24  0126   WBC 5.45 4.75 3.27*   HGB 8.9* 9.0* 8.2*   HCT 25.7* 25.5* 24.0*   PLT 67* 58* 64*   MONO 5.9  0.3 5.9  0.3 5.5  0.2*   EOSINOPHIL 1.1 1.3 2.4        Recent Labs   Lab 01/30/24  0230 01/31/24  0318 02/01/24  0126   * 128* 127*   K 4.0 3.8 3.6    101 97   CO2 16* 14* 12*   BUN 43* 64* 79*   CREATININE 4.6* 6.4* 7.6*   CALCIUM 7.7* 7.8* 7.6*   PHOS 3.8 6.4* 7.1*         Diagnostic Results:  X-Ray: Reviewed  US: Reviewed  Echo: Reviewed  ASSESSMENT/PLAN:     CKD4 - Cr baseline 2.8 Proteinuria    -- Prior work up noncontributory  Only TOOTIE + non specific   -- Following with Dr Loly Payne in clinic for his CKD4  Cr trending up not clear etiology needs Kidney biopsy was on Plavix now off - presumed Kidney Biopsy Monday by IR appreciate assistance.   --   LFTs improving  but Cr trending  up since admission 3.2=>3.5=>3.9=>4.6 =>6.4 => 7.6  CPK elevated but not to the point to cause BRETT   Decreases Plts - ? Acute illness related   -- reorder extensive Serological and immunological work up just incase first one was false negative.        Hyponatremia 128   -- Osm with in range 289   -- concerning for other protein/Urea contribution to S OSm        2. HTN   controlled   3. Anemia of chronic kidney disease    Recent Labs   Lab 01/31/24  0318 01/31/24  1126 02/01/24  0126   HGB 8.9* 9.0* 8.2*   HCT 25.7* 25.5* 24.0*   PLT 67* 58* 64*         Iron   Lab Results   Component Value Date    IRON 60 03/14/2023    TIBC 299 03/14/2023    FERRITIN 191 03/14/2023       4. MBD (E88.9 M90.80) - PTH at goal   Recent Labs   Lab 02/01/24  0126   CALCIUM 7.6*   PHOS 7.1*       Recent Labs   Lab 01/30/24  0230 01/31/24  0318 02/01/24  0126   MG 2.0 2.0 2.2         Lab Results   Component Value Date    PTH 60.6 08/11/2021    CALCIUM 7.6 (L) 02/01/2024    CAION 1.31 07/14/2020    PHOS 7.1 (H) 02/01/2024     Lab Results   Component Value Date    [id number] 42 08/11/2021       Lab Results   Component Value Date    CO2 12 (L) 02/01/2024       5. A-fib -  per cardiology   6. Nutrition/Hypoalbuminemia    Recent Labs   Lab 01/30/24  1131 01/31/24  0318 02/01/24  0126   LABPROT  14.0*  --  11.4   ALBUMIN  --  1.9* 1.9*       Nepro with meals TID.       Thank you for the consult, will follow  With any question please call [phone]  Nena Arriola MD    [facility]    ESPERANZA Payne MD,   MD BRANDY Arredondo MD E. V. Harmon, NP    [address]  [place], [address]  [phone]

## 2024-02-01 NOTE — ASSESSMENT & PLAN NOTE
"Patient presented with "chest hurts", cough, decreased appetite. Temp 103 On RA, sating 94-96%. WBC 6.15, Lactic acid 1.2, Procal 0.28.  CXR: Increased lung opacities, concerning for interstitial edema and/or pneumonitis.  Short-term imaging follow-up could be performed to ensure resolution.    Plan:  Blood cultures pending (NGTD)  Completed course of IV Ceftriaxone + Azithromycin  "

## 2024-02-01 NOTE — NURSING
Pt states had a BM and noticed some blood upon wiping themself. Was informed to call nurse whenever he had another BM and noticed bloody. Informed Dr. Ben Segovia of the same. Reported to the night oncoming nurse NICOLASA Dickerson.

## 2024-02-01 NOTE — NURSING
"RAPID RESPONSE NURSE PROACTIVE ROUNDING NOTE       Time of Visit: 0110    Admit Date: 2024  LOS: 5  Code Status: Full Code   Date of Visit: 2024  : 1960  Age: 63 y.o.  Sex: male  Race: White  Bed: K429/K429 A:   MRN: [id number]  Was the patient discharged from an ICU this admission? No   Was the patient discharged from a PACU within last 24 hours? No   Did the patient receive conscious sedation/general anesthesia in last 24 hours? No   Was the patient in the ED within the past 24 hours? No   Was the patient on NIPPV within the past 24 hours? No   Attending Physician: Pawan Garcia,*  Primary Service: Hospitalist   Time spent at the bedside: 15 -30 min    SITUATION    Notified by bedside RN via phone call  Reason for alert: PIV access    Diagnosis: CAP (community acquired pneumonia)   has a past medical history of Allergy, Anemia, Anticoagulant long-term use, Arthritis, CKD (chronic kidney disease) stage 4, GFR 15-29 ml/min, COPD (chronic obstructive pulmonary disease), Coronary artery disease, Heart attack, Hematuria, Hemothorax, Hyperlipidemia, Hypertension, Hyperuricemia, Hypocalcemia, Hypokalemia, Hypophosphatemia, Hypothyroidism, PAD (peripheral artery disease), Proteinuria, and Vitamin D deficiency.    Last Vitals:  Temp: 98.8 °F (37.1 °C) (2315)  Pulse: 59 (2348)  Resp: 18 (2315)  BP: 141/66 (2315)  SpO2: 96 % (2315)    24 Hour Vitals Range:  Temp:  [97.6 °F (36.4 °C)-98.8 °F (37.1 °C)]   Pulse:  [53-61]   Resp:  [18-23]   BP: (139-153)/(64-74)   SpO2:  [93 %-99 %]     Contacted by bedside RN for PIV access. 20g 2.25" accucath placed to L brachial vein.    FOLLOW UP    Call back the Rapid Response NurseWili at [phone] for additional questions or concerns.           "

## 2024-02-01 NOTE — ASSESSMENT & PLAN NOTE
Patient with documented diagnosis of Atrial Fibrillation per notes by Cardiologist Dmitriy Chavarria MD reports being compliant with the following home regimen: Apixaban 5mg BID, Amiodarone. EKG taken during admission reveals Aflutter/A Fib    Plan:  - [id number] Score: 1  - Will hold Amiodarone in setting of transaminitis.

## 2024-02-02 LAB
ALBUMIN SERPL BCP-MCNC: 2 G/DL (ref 3.5–5.2)
ALBUMIN SERPL ELPH-MCNC: 2.22 G/DL (ref 3.35–5.55)
ALP SERPL-CCNC: 87 U/L (ref 55–135)
ALPHA1 GLOB SERPL ELPH-MCNC: 0.38 G/DL (ref 0.17–0.41)
ALPHA2 GLOB SERPL ELPH-MCNC: 0.73 G/DL (ref 0.43–0.99)
ALT SERPL W/O P-5'-P-CCNC: 512 U/L (ref 10–44)
ANA SER QL IF: NORMAL
ANION GAP SERPL CALC-SCNC: 17 MMOL/L (ref 8–16)
ANTI SM ANTIBODY: 0.11 RATIO (ref 0–0.99)
ANTI SM/RNP ANTIBODY: 0.14 RATIO (ref 0–0.99)
ANTI-SM INTERPRETATION: NEGATIVE
ANTI-SM/RNP INTERPRETATION: NEGATIVE
ANTI-SSA ANTIBODY: 0.07 RATIO (ref 0–0.99)
ANTI-SSA ANTIBODY: 0.09 RATIO (ref 0–0.99)
ANTI-SSA INTERPRETATION: NEGATIVE
ANTI-SSA INTERPRETATION: NEGATIVE
ANTI-SSB ANTIBODY: 0.11 RATIO (ref 0–0.99)
ANTI-SSB INTERPRETATION: NEGATIVE
AST SERPL-CCNC: 387 U/L (ref 10–40)
B-GLOBULIN SERPL ELPH-MCNC: 0.66 G/DL (ref 0.5–1.1)
BASOPHILS # BLD AUTO: 0.01 K/UL (ref 0–0.2)
BASOPHILS NFR BLD: 0.3 % (ref 0–1.9)
BILIRUB SERPL-MCNC: 0.6 MG/DL (ref 0.1–1)
BUN SERPL-MCNC: 90 MG/DL (ref 8–23)
CALCIUM SERPL-MCNC: 7.7 MG/DL (ref 8.7–10.5)
CHLORIDE SERPL-SCNC: 99 MMOL/L (ref 95–110)
CO2 SERPL-SCNC: 12 MMOL/L (ref 23–29)
CREAT SERPL-MCNC: 8.9 MG/DL (ref 0.5–1.4)
DIFFERENTIAL METHOD BLD: ABNORMAL
DSDNA AB SER-ACNC: NORMAL [IU]/ML
DSDNA AB SER-ACNC: NORMAL [IU]/ML
EOSINOPHIL # BLD AUTO: 0.1 K/UL (ref 0–0.5)
EOSINOPHIL NFR BLD: 3.6 % (ref 0–8)
ERYTHROCYTE [DISTWIDTH] IN BLOOD BY AUTOMATED COUNT: 12.3 % (ref 11.5–14.5)
EST. GFR  (NO RACE VARIABLE): 6 ML/MIN/1.73 M^2
GAMMA GLOB SERPL ELPH-MCNC: 0.71 G/DL (ref 0.67–1.58)
GLUCOSE SERPL-MCNC: 97 MG/DL (ref 70–110)
HCT VFR BLD AUTO: 25.2 % (ref 40–54)
HGB BLD-MCNC: 9.1 G/DL (ref 14–18)
IMM GRANULOCYTES # BLD AUTO: 0.04 K/UL (ref 0–0.04)
IMM GRANULOCYTES NFR BLD AUTO: 1.3 % (ref 0–0.5)
INTERPRETATION SERPL IFE-IMP: NORMAL
KAPPA LC SER QL IA: 27.19 MG/DL (ref 0.33–1.94)
KAPPA LC/LAMBDA SER IA: 2.35 (ref 0.26–1.65)
LAMBDA LC SER QL IA: 11.56 MG/DL (ref 0.57–2.63)
LYMPHOCYTES # BLD AUTO: 0.4 K/UL (ref 1–4.8)
LYMPHOCYTES NFR BLD: 12.3 % (ref 18–48)
MAGNESIUM SERPL-MCNC: 2.2 MG/DL (ref 1.6–2.6)
MCH RBC QN AUTO: 29.4 PG (ref 27–31)
MCHC RBC AUTO-ENTMCNC: 36.1 G/DL (ref 32–36)
MCV RBC AUTO: 82 FL (ref 82–98)
MONOCYTES # BLD AUTO: 0.3 K/UL (ref 0.3–1)
MONOCYTES NFR BLD: 8.7 % (ref 4–15)
NEUTROPHILS # BLD AUTO: 2.3 K/UL (ref 1.8–7.7)
NEUTROPHILS NFR BLD: 73.8 % (ref 38–73)
NRBC BLD-RTO: 0 /100 WBC
PHOSPHATE SERPL-MCNC: 8.1 MG/DL (ref 2.7–4.5)
PLATELET # BLD AUTO: 62 K/UL (ref 150–450)
PMV BLD AUTO: 12.7 FL (ref 9.2–12.9)
POTASSIUM SERPL-SCNC: 3.5 MMOL/L (ref 3.5–5.1)
PROT SERPL-MCNC: 4.7 G/DL (ref 6–8.4)
PROT SERPL-MCNC: 5.3 G/DL (ref 6–8.4)
RBC # BLD AUTO: 3.09 M/UL (ref 4.6–6.2)
SODIUM SERPL-SCNC: 128 MMOL/L (ref 136–145)
WBC # BLD AUTO: 3.09 K/UL (ref 3.9–12.7)

## 2024-02-02 PROCEDURE — 94640 AIRWAY INHALATION TREATMENT: CPT

## 2024-02-02 PROCEDURE — 36415 COLL VENOUS BLD VENIPUNCTURE: CPT

## 2024-02-02 PROCEDURE — 83735 ASSAY OF MAGNESIUM: CPT

## 2024-02-02 PROCEDURE — A4216 STERILE WATER/SALINE, 10 ML: HCPCS

## 2024-02-02 PROCEDURE — 84100 ASSAY OF PHOSPHORUS: CPT

## 2024-02-02 PROCEDURE — 85025 COMPLETE CBC W/AUTO DIFF WBC: CPT

## 2024-02-02 PROCEDURE — 80053 COMPREHEN METABOLIC PANEL: CPT

## 2024-02-02 PROCEDURE — 25000003 PHARM REV CODE 250: Performed by: STUDENT IN AN ORGANIZED HEALTH CARE EDUCATION/TRAINING PROGRAM

## 2024-02-02 PROCEDURE — 11000001 HC ACUTE MED/SURG PRIVATE ROOM

## 2024-02-02 PROCEDURE — 25000003 PHARM REV CODE 250

## 2024-02-02 PROCEDURE — 94761 N-INVAS EAR/PLS OXIMETRY MLT: CPT

## 2024-02-02 PROCEDURE — 25000242 PHARM REV CODE 250 ALT 637 W/ HCPCS

## 2024-02-02 RX ORDER — SODIUM BICARBONATE 650 MG/1
1300 TABLET ORAL 3 TIMES DAILY
Status: DISCONTINUED | OUTPATIENT
Start: 2024-02-02 | End: 2024-02-08

## 2024-02-02 RX ADMIN — LEVOTHYROXINE SODIUM 75 MCG: 75 TABLET ORAL at 05:02

## 2024-02-02 RX ADMIN — CARVEDILOL 25 MG: 25 TABLET, FILM COATED ORAL at 09:02

## 2024-02-02 RX ADMIN — SODIUM BICARBONATE 650 MG TABLET 1300 MG: at 04:02

## 2024-02-02 RX ADMIN — Medication 9 MG: at 12:02

## 2024-02-02 RX ADMIN — SODIUM BICARBONATE 650 MG TABLET 1300 MG: at 08:02

## 2024-02-02 RX ADMIN — SODIUM BICARBONATE 650 MG TABLET 650 MG: at 09:02

## 2024-02-02 RX ADMIN — Medication 9 MG: at 08:02

## 2024-02-02 RX ADMIN — IPRATROPIUM BROMIDE AND ALBUTEROL SULFATE 3 ML: .5; 3 SOLUTION RESPIRATORY (INHALATION) at 04:02

## 2024-02-02 RX ADMIN — IPRATROPIUM BROMIDE AND ALBUTEROL SULFATE 3 ML: .5; 3 SOLUTION RESPIRATORY (INHALATION) at 08:02

## 2024-02-02 RX ADMIN — Medication 5 ML: at 08:02

## 2024-02-02 RX ADMIN — ISOSORBIDE MONONITRATE 60 MG: 30 TABLET, EXTENDED RELEASE ORAL at 09:02

## 2024-02-02 RX ADMIN — GUAIFENESIN 200 MG: 200 SOLUTION ORAL at 12:02

## 2024-02-02 RX ADMIN — IPRATROPIUM BROMIDE AND ALBUTEROL SULFATE 3 ML: .5; 3 SOLUTION RESPIRATORY (INHALATION) at 07:02

## 2024-02-02 RX ADMIN — CARVEDILOL 25 MG: 25 TABLET, FILM COATED ORAL at 04:02

## 2024-02-02 RX ADMIN — IPRATROPIUM BROMIDE AND ALBUTEROL SULFATE 3 ML: .5; 3 SOLUTION RESPIRATORY (INHALATION) at 12:02

## 2024-02-02 NOTE — PLAN OF CARE
Tx give. Pt stated that he does not want his 12 and 4 breathing tx.  No distress, will continue to monitor.

## 2024-02-02 NOTE — PROGRESS NOTES
"[facility] Medicine  Progress Note    Patient Name: Domingo Gross  MRN: [id number]  Patient Class: IP- Inpatient   Admission Date: 1/27/2024  Length of Stay: 6 days  Attending Physician: Pawan Garcia,*  Primary Care Provider: Analy Encarnacion MD        Subjective:     Principal Problem:CAP (community acquired pneumonia)        HPI:  63-year-old man with PMHx of HTN, Atrial Fibrillation (sees Dr. Calderón, on Amiodarone, Apixaban), COPD (inhaler PRN), CKD (sees Dr. Payne), Hypothyroidism (Levothyroxine) presents via ambulance to [facility] on 1/27/24 for 1-day history of chest pain, cough, irregular breathing. History was collected from patient and wife, who is at bedside.    Patient reports experiencing chest pain, cough, and decreased appetite that started last night. Reports a fever of 102, did not take Tylenol. This morning, wife noticed that he was breathing "funny" and called the ambulance. Patient's boss recently tested positive for Flu/COVID. Patient emphasized that lack of food intake was due to decreased appetite and denied emesis and nausea.    Currently patient denies any chest pain or difficulty breathing. He reports taking potassium at home & being compliant with all medications. He is a former smoker and denies drinking alcohol.    In the ER, vitals were as follows - Temp 99.8 -> 102.3, HR 68, /87, O2 94%. Labs were significant for K 2.4, Magnesium 1.3, BUN/Cr 21/3.2 (baseline 2.8), AST//302, Alk phos 51 WBC 6.15, Procal 0.28. Patient was given 40mEq of potassium PO, 10mEq of KCl and admitted to [facility] for management/further workup of of Hypokalemia, BRETT, PNA, Transaminitis.    Overview/Hospital Course:  No notes on file    Interval History: No adverse events overnight. LFTs continue to improve. BUN/Cr continue to elevate. Patient remains hemodynamically and clinically stable at this time. Reports no decrease in UOP.    Review of Systems "   Constitutional:  Negative for appetite change, chills and fever.   HENT:  Negative for rhinorrhea, sore throat and trouble swallowing.    Eyes:  Negative for pain and visual disturbance.   Respiratory:  Negative for cough and shortness of breath.    Cardiovascular:  Negative for chest pain, palpitations and leg swelling.   Gastrointestinal:  Negative for abdominal pain, constipation, diarrhea, nausea and vomiting.   Genitourinary:  Negative for dysuria and hematuria.   Musculoskeletal:  Negative for gait problem and neck stiffness.   Skin:  Negative for rash and wound.   Neurological:  Negative for tremors, weakness and headaches.   Psychiatric/Behavioral:  Negative for agitation and confusion.      Objective:     Vital Signs (Most Recent):  Temp: 97.6 °F (36.4 °C) (02/02/24 0729)  Pulse: (!) 55 (02/02/24 0734)  Resp: 16 (02/02/24 0734)  BP: (!) 174/77 (02/02/24 0729)  SpO2: 96 % (02/02/24 0734) Vital Signs (24h Range):  Temp:  [97.6 °F (36.4 °C)-97.9 °F (36.6 °C)] 97.6 °F (36.4 °C)  Pulse:  [55-61] 55  Resp:  [12-20] 16  SpO2:  [94 %-96 %] 96 %  BP: (139-176)/(69-79) 174/77     Weight: 83.9 kg (184 lb 15.5 oz)  Body mass index is 26.54 kg/m².    Intake/Output Summary (Last 24 hours) at 2/2/2024 1139  Last data filed at 2/2/2024 0635  Gross per 24 hour   Intake 560 ml   Output 1100 ml   Net -540 ml         Physical Exam  Vitals and nursing note reviewed.   Constitutional:       General: He is not in acute distress.     Appearance: Normal appearance. He is normal weight. He is not ill-appearing or toxic-appearing.   HENT:      Head: Normocephalic.      Right Ear: External ear normal.      Left Ear: External ear normal.      Nose: Nose normal.      Mouth/Throat:      Pharynx: Oropharynx is clear.   Eyes:      Extraocular Movements: Extraocular movements intact.   Cardiovascular:      Rate and Rhythm: Normal rate and regular rhythm.      Pulses: Normal pulses.   Pulmonary:      Effort: Pulmonary effort is normal. No  respiratory distress.      Breath sounds: Normal breath sounds. No wheezing, rhonchi or rales.   Abdominal:      General: Abdomen is flat. There is no distension.      Palpations: Abdomen is soft.      Tenderness: There is no abdominal tenderness. There is no guarding or rebound.   Musculoskeletal:         General: Normal range of motion.      Cervical back: Normal range of motion.      Right lower leg: No edema.      Left lower leg: No edema.   Skin:     General: Skin is warm.      Coloration: Skin is not jaundiced.   Neurological:      General: No focal deficit present.      Mental Status: He is alert and oriented to person, place, and time. Mental status is at baseline.      Motor: No weakness.      Comments: No asterixis noted.   Psychiatric:         Mood and Affect: Mood normal.         Behavior: Behavior normal.         Thought Content: Thought content normal.             Significant Labs: All pertinent labs within the past 24 hours have been reviewed.  CBC:   Recent Labs   Lab 02/01/24  0126 02/02/24  0401   WBC 3.27* 3.09*   HGB 8.2* 9.1*   HCT 24.0* 25.2*   PLT 64* 62*     CMP:   Recent Labs   Lab 02/01/24  0126 02/02/24  0401   * 128*   K 3.6 3.5   CL 97 99   CO2 12* 12*    97   BUN 79* 90*   CREATININE 7.6* 8.9*   CALCIUM 7.6* 7.7*   PROT 5.1* 5.3*   ALBUMIN 1.9* 2.0*   BILITOT 0.7 0.6   ALKPHOS 78 87   * 387*   * 512*   ANIONGAP 18* 17*       Significant Imaging: I have reviewed all pertinent imaging results/findings within the past 24 hours.    Assessment/Plan:      * CAP (community acquired pneumonia)  RESOLVED.    Hyponatremia  Plan:  - Nephrology consulted, appreciate recommendations.  - Fluid restriction.    CMV (cytomegalovirus infection)  Plan:  - Quantitative CMV DNA negative.  - Not likely acute infection.    EBV infection  EBV IgM and DNA are negative  these serologies likely represent previous infection    Plan:  - ID consulted, appreciate recommendations.      Acute  liver failure without hepatic coma  Patient presents with AST/ALT of 481/302, (24/19 5 months ago). Denies drinking alcohol.    Plan:  Will hold PRN Tylenol  Abdominal ultrasound with no acute abnormalities  Hepatitis panel negative  Additional work-up labs pending.    Hypothyroidism  Patient with reported TSH 5 months ago 13.669    Plan:  Repeat TSH elevated, T4 normal  Increased home levothyroxine    Persistent atrial fibrillation  Patient with documented diagnosis of Atrial Fibrillation per notes by Cardiologist Dmitriy Chavarria MD reports being compliant with the following home regimen: Apixaban 5mg BID, Amiodarone. EKG taken during admission reveals Aflutter/A Fib    Plan:  - [id number] Score: 1  - Will hold Amiodarone in setting of transaminitis.    BRETT (acute kidney injury)  W/ Baseline CKD    Plan:  - Urine Studies suggesting intrinsic etiology (FeNa 3.9%)  - Strict I&O  - Avoid nephrotoxins and renally dose meds  - Nephrology consulted, appreciate recommendations.  - Plan for renal biopsy Mon 2/6  - IR consulted, appreciate recommendations.   - Remaining plan as in hyponatremia    Hyperlipidemia  Patient with known history of HLD reports being compliant with the following home regimen: Rosuvastatin 40mg QD    Plan:   - Hold statin in setting of elevated transaminitis  - Continue home equivalent Atorvastatin 80 mg QD when appropriate    HTN (hypertension)  Patient with known history of HTN reports being compliant with the following home regimen: Carvedilol 25mg BID, Eplerenone 50mg QD, Imdur 60mg QD. Patient's pressures on admission to the ER are (143-198)/(64-88) - elevated likely secondary to not taking medications before arriving to the hospital.    Plan:  - Continue home Carvedilol and Imdur  - Eplerenone not available in hospital; in setting of his hypokalemia, will use Spironolactone if additional antihypertensives are required      VTE Risk Mitigation (From admission, onward)           Ordered     IP VTE  HIGH RISK PATIENT  Once         01/27/24 1240     Place sequential compression device  Until discontinued         01/27/24 1240                    Discharge Planning   FLACO:      Code Status: Full Code   Is the patient medically ready for discharge?:     Reason for patient still in hospital (select all that apply): Patient trending condition, Laboratory test, Treatment, Consult recommendations, and Pending disposition  Discharge Plan A: Home, Home with family   Discharge Delays: None known at this time      ________________________  Jude Sanford MD  [facility] PGY-2

## 2024-02-02 NOTE — PROGRESS NOTES
Nephrology Progress  Note     Consult Requested By: Pawan Garcia,*  Reason for Consult: BRETT on CKD4     SUBJECTIVE:        ?    Review of Systems   Constitutional:  Negative for chills and fever.   HENT:  Negative for congestion and sore throat.    Eyes:  Negative for blurred vision, double vision and photophobia.   Respiratory:  Positive for cough. Negative for shortness of breath.    Cardiovascular:  Negative for chest pain, palpitations and leg swelling.   Gastrointestinal:  Negative for abdominal pain, diarrhea, nausea and vomiting.   Genitourinary:  Negative for dysuria and urgency.   Musculoskeletal:  Negative for joint pain and myalgias.   Skin:  Negative for itching and rash.   Neurological:  Positive for weakness. Negative for dizziness, sensory change and headaches.   Endo/Heme/Allergies:  Negative for polydipsia. Does not bruise/bleed easily.   Psychiatric/Behavioral:  Negative for depression.        Past Medical History:   Diagnosis Date    Allergy     Anemia     Anticoagulant long-term use     Arthritis     CKD (chronic kidney disease) stage 4, GFR 15-29 ml/min     COPD (chronic obstructive pulmonary disease) 08/20/2021    Coronary artery disease     Heart attack 10/04/2019    Hematuria     Hemothorax     Hyperlipidemia     Hypertension     Hyperuricemia     Hypocalcemia     Hypokalemia     Hypophosphatemia     Hypothyroidism 3/2/2023    PAD (peripheral artery disease)     Proteinuria     Vitamin D deficiency           OBJECTIVE:     Vital Signs (Most Recent)  Vitals:    02/02/24 0448 02/02/24 0600 02/02/24 0729 02/02/24 0734   BP: (!) 176/79  (!) 174/77    BP Location: Right arm  Right arm    Patient Position: Lying  Lying    Pulse: 61  (!) 57 (!) 55   Resp: 18  18 16   Temp: 97.7 °F (36.5 °C)  97.6 °F (36.4 °C)    TempSrc: Oral  Oral    SpO2: 96%  95% 96%   Weight:  83.9 kg (184 lb 15.5 oz)     Height:                       Medications:   albuterol-ipratropium  3 mL Nebulization Q4H     carvediloL  25 mg Oral BID WM    isosorbide mononitrate  60 mg Oral Daily    levothyroxine  75 mcg Oral Before breakfast    sodium bicarbonate  1,300 mg Oral TID           Physical Exam  Vitals and nursing note reviewed.   Constitutional:       General: He is not in acute distress.     Appearance: He is not diaphoretic.   HENT:      Head: Normocephalic and atraumatic.      Mouth/Throat:      Pharynx: No oropharyngeal exudate.   Eyes:      General: No scleral icterus.     Conjunctiva/sclera: Conjunctivae normal.      Pupils: Pupils are equal, round, and reactive to light.   Cardiovascular:      Rate and Rhythm: Normal rate and regular rhythm.      Heart sounds: Normal heart sounds. No murmur heard.  Pulmonary:      Effort: Pulmonary effort is normal. No respiratory distress.      Breath sounds: Examination of the right-middle field reveals rales. Examination of the left-middle field reveals rales. Examination of the right-lower field reveals rales. Examination of the left-lower field reveals rales. Rales present.   Abdominal:      General: Bowel sounds are normal. There is no distension.      Palpations: Abdomen is soft.      Tenderness: There is no abdominal tenderness.   Musculoskeletal:         General: Normal range of motion.      Cervical back: Normal range of motion and neck supple.      Right lower leg: No edema.      Left lower leg: No edema.   Skin:     General: Skin is warm and dry.      Findings: No erythema.   Neurological:      Mental Status: He is alert and oriented to person, place, and time.      Cranial Nerves: No cranial nerve deficit.   Psychiatric:         Mood and Affect: Affect normal.         Cognition and Memory: Memory normal.         Judgment: Judgment normal.         Laboratory:  Recent Labs   Lab 01/31/24  1126 02/01/24  0126 02/02/24  0401   WBC 4.75 3.27* 3.09*   HGB 9.0* 8.2* 9.1*   HCT 25.5* 24.0* 25.2*   PLT 58* 64* 62*   MONO 5.9  0.3 5.5  0.2* 8.7  0.3   EOSINOPHIL 1.3 2.4 3.6        Recent Labs   Lab 01/31/24 0318 02/01/24  0126 02/02/24  0401   * 127* 128*   K 3.8 3.6 3.5    97 99   CO2 14* 12* 12*   BUN 64* 79* 90*   CREATININE 6.4* 7.6* 8.9*   CALCIUM 7.8* 7.6* 7.7*   PHOS 6.4* 7.1* 8.1*         Diagnostic Results:  X-Ray: Reviewed  US: Reviewed  Echo: Reviewed  ASSESSMENT/PLAN:     BRETT on CKD4 - Cr baseline 2.8 Proteinuria    -- Prior work up noncontributory  Only TOOTIE + non specific   -- Following with Dr Loly Payne in clinic for his CKD4  Cr trending up not clear etiology needs Kidney biopsy was on Plavix now off - presumed Kidney Biopsy Monday by IR appreciate assistance.   --   LFTs improving  but Cr trending  up since admission 3.2=>3.5=>3.9=>4.6 =>6.4 => 7.6=> 8.1 DDx ATN, AIN, GN  CPK elevated but not to the point to cause BRETT   Decreases Plts - ? Acute illness related LFTs better however   -- reorder extensive Serological and immunological work up just incase first one was false negative.   -- Good UOP no Uremia symptoms but very close monitor daily        Hyponatremia 128   -- Osm with in range 289   -- concerning for other protein/Urea contribution to S OSm        2. HTN   controlled   3. Anemia of chronic kidney disease    Recent Labs   Lab 01/31/24  1126 02/01/24  0126 02/02/24  0401   HGB 9.0* 8.2* 9.1*   HCT 25.5* 24.0* 25.2*   PLT 58* 64* 62*         Iron   Lab Results   Component Value Date    IRON 60 03/14/2023    TIBC 299 03/14/2023    FERRITIN 191 03/14/2023       4. MBD (E88.9 M90.80) - PTH at goal   Recent Labs   Lab 02/02/24  0401   CALCIUM 7.7*   PHOS 8.1*       Recent Labs   Lab 01/31/24 0318 02/01/24  0126 02/02/24  0401   MG 2.0 2.2 2.2         Lab Results   Component Value Date    PTH 60.6 08/11/2021    CALCIUM 7.7 (L) 02/02/2024    CAION 1.31 07/14/2020    PHOS 8.1 (H) 02/02/2024     Lab Results   Component Value Date    [id number] 42 08/11/2021       Lab Results   Component Value Date    CO2 12 (L) 02/02/2024       5. A-fib -  per primary    6. Nutrition/Hypoalbuminemia    Recent Labs   Lab 01/30/24  1131 01/31/24  0318 02/01/24  0126 02/02/24  0401   LABPROT 14.0*  --  11.4  --    ALBUMIN  --    < > 1.9* 2.0*    < > = values in this interval not displayed.       Nepro with meals TID.       Thank you for the consult, will follow  With any question please call [phone]  Nena Arriola MD    [facility]    ESPERANZA Payne MD,   MD BRANDY Arredondo MD E. V. Harmon, NP    [address]  [place], [address] [phone]

## 2024-02-02 NOTE — PLAN OF CARE
Chart reviewed / case discussed in am MDR -   Pt undergoing Nephrology workup   - pt for kidney bx Mon per MD communication - Secure Chat   Per therapy - no therapy indicated / no dme recc       CM will continue to monitor pt to determine d/c needs - pt may need OP HD set up.    tomás Peraza  [phone]     dx:  Viral Syn, BRETT, COPD, fever  acute liver injury and acute on chronic kidney injury   PCP and Nephrology apts requested per Nirali with scheduling    Future Appointments   Date Time Provider Department Center   2/29/2024  7:15 AM LAB, [facility] [place]   3/5/2024  2:40 PM Analy Encarnacion MD [facility] [place]   3/7/2024 11:00 AM Loly Payne MD [facility]   3/13/2024  9:30 AM APPOINTMENT [facility]   3/13/2024 10:00 AM Robby Reddy MD [facility] HEM ONC [facility]        02/02/24 1527   Post-Acute Status   Post-Acute Authorization Other  (TBD -)   Discharge Plan   Discharge Plan A Home;Home with family   Discharge Plan B Home;Home with family;Home Health

## 2024-02-02 NOTE — PLAN OF CARE
Problem: Adult Inpatient Plan of Care  Goal: Plan of Care Review  Outcome: Ongoing, Progressing  Chart check complete. Vitals, orders, labs, and progress notes reviewed. Care plan updated. Will monitor. '

## 2024-02-02 NOTE — PLAN OF CARE
Patient on room air with documented SpO2 and in no apparent distress. Will continue to monitor.  The proper method of use, as well as anticipated side effects, of this aerosol treatment are discussed and demonstrated to the patient.

## 2024-02-02 NOTE — ASSESSMENT & PLAN NOTE
Patient with documented diagnosis of Atrial Fibrillation per notes by Cardiologist Dmitriy Chavarria MD reports being compliant with the following home regimen: Apixaban 5mg BID, Amiodarone. EKG taken during admission reveals Aflutter/A Fib    Plan:  - [id number] Score: 1  - Will hold Amiodarone in setting of transaminitis.

## 2024-02-02 NOTE — PLAN OF CARE
Problem: Adult Inpatient Plan of Care  Goal: Plan of Care Review  Outcome: Ongoing, Progressing  Goal: Patient-Specific Goal (Individualized)  Outcome: Ongoing, Progressing  Goal: Readiness for Transition of Care  Outcome: Ongoing, Progressing     Problem: Fluid and Electrolyte Imbalance (Acute Kidney Injury/Impairment)  Goal: Fluid and Electrolyte Balance  Outcome: Ongoing, Progressing     Problem: Oral Intake Inadequate (Acute Kidney Injury/Impairment)  Goal: Optimal Nutrition Intake  Outcome: Ongoing, Progressing     Problem: Renal Function Impairment (Acute Kidney Injury/Impairment)  Goal: Effective Renal Function  Outcome: Ongoing, Progressing     Problem: Infection (Pneumonia)  Goal: Resolution of Infection Signs and Symptoms  Outcome: Ongoing, Progressing     Problem: Respiratory Compromise (Pneumonia)  Goal: Effective Oxygenation and Ventilation  Outcome: Ongoing, Progressing     Problem: Electrolyte Imbalance  Goal: Electrolyte Balance  Outcome: Ongoing, Progressing     Problem: COPD (Chronic Obstructive Pulmonary Disease) Comorbidity  Goal: Maintenance of COPD Symptom Control  Outcome: Ongoing, Progressing     Problem: Heart Failure Comorbidity  Goal: Maintenance of Heart Failure Symptom Control  Outcome: Ongoing, Progressing     Problem: Hypertension Comorbidity  Goal: Blood Pressure in Desired Range  Outcome: Ongoing, Progressing     Problem: Fall Injury Risk  Goal: Absence of Fall and Fall-Related Injury  Outcome: Ongoing, Progressing     Problem: Skin Injury Risk Increased  Goal: Skin Health and Integrity  Outcome: Ongoing, Progressing

## 2024-02-02 NOTE — SUBJECTIVE & OBJECTIVE
Interval History: No adverse events overnight. LFTs continue to improve. BUN/Cr continue to elevate. Patient remains hemodynamically and clinically stable at this time. Reports no decrease in UOP.    Review of Systems   Constitutional:  Negative for appetite change, chills and fever.   HENT:  Negative for rhinorrhea, sore throat and trouble swallowing.    Eyes:  Negative for pain and visual disturbance.   Respiratory:  Negative for cough and shortness of breath.    Cardiovascular:  Negative for chest pain, palpitations and leg swelling.   Gastrointestinal:  Negative for abdominal pain, constipation, diarrhea, nausea and vomiting.   Genitourinary:  Negative for dysuria and hematuria.   Musculoskeletal:  Negative for gait problem and neck stiffness.   Skin:  Negative for rash and wound.   Neurological:  Negative for tremors, weakness and headaches.   Psychiatric/Behavioral:  Negative for agitation and confusion.      Objective:     Vital Signs (Most Recent):  Temp: 97.6 °F (36.4 °C) (02/02/24 0729)  Pulse: (!) 55 (02/02/24 0734)  Resp: 16 (02/02/24 0734)  BP: (!) 174/77 (02/02/24 0729)  SpO2: 96 % (02/02/24 0734) Vital Signs (24h Range):  Temp:  [97.6 °F (36.4 °C)-97.9 °F (36.6 °C)] 97.6 °F (36.4 °C)  Pulse:  [55-61] 55  Resp:  [12-20] 16  SpO2:  [94 %-96 %] 96 %  BP: (139-176)/(69-79) 174/77     Weight: 83.9 kg (184 lb 15.5 oz)  Body mass index is 26.54 kg/m².    Intake/Output Summary (Last 24 hours) at 2/2/2024 1139  Last data filed at 2/2/2024 0635  Gross per 24 hour   Intake 560 ml   Output 1100 ml   Net -540 ml         Physical Exam  Vitals and nursing note reviewed.   Constitutional:       General: He is not in acute distress.     Appearance: Normal appearance. He is normal weight. He is not ill-appearing or toxic-appearing.   HENT:      Head: Normocephalic.      Right Ear: External ear normal.      Left Ear: External ear normal.      Nose: Nose normal.      Mouth/Throat:      Pharynx: Oropharynx is clear.    Eyes:      Extraocular Movements: Extraocular movements intact.   Cardiovascular:      Rate and Rhythm: Normal rate and regular rhythm.      Pulses: Normal pulses.   Pulmonary:      Effort: Pulmonary effort is normal. No respiratory distress.      Breath sounds: Normal breath sounds. No wheezing, rhonchi or rales.   Abdominal:      General: Abdomen is flat. There is no distension.      Palpations: Abdomen is soft.      Tenderness: There is no abdominal tenderness. There is no guarding or rebound.   Musculoskeletal:         General: Normal range of motion.      Cervical back: Normal range of motion.      Right lower leg: No edema.      Left lower leg: No edema.   Skin:     General: Skin is warm.      Coloration: Skin is not jaundiced.   Neurological:      General: No focal deficit present.      Mental Status: He is alert and oriented to person, place, and time. Mental status is at baseline.      Motor: No weakness.      Comments: No asterixis noted.   Psychiatric:         Mood and Affect: Mood normal.         Behavior: Behavior normal.         Thought Content: Thought content normal.             Significant Labs: All pertinent labs within the past 24 hours have been reviewed.  CBC:   Recent Labs   Lab 02/01/24  0126 02/02/24  0401   WBC 3.27* 3.09*   HGB 8.2* 9.1*   HCT 24.0* 25.2*   PLT 64* 62*     CMP:   Recent Labs   Lab 02/01/24  0126 02/02/24  0401   * 128*   K 3.6 3.5   CL 97 99   CO2 12* 12*    97   BUN 79* 90*   CREATININE 7.6* 8.9*   CALCIUM 7.6* 7.7*   PROT 5.1* 5.3*   ALBUMIN 1.9* 2.0*   BILITOT 0.7 0.6   ALKPHOS 78 87   * 387*   * 512*   ANIONGAP 18* 17*       Significant Imaging: I have reviewed all pertinent imaging results/findings within the past 24 hours.

## 2024-02-02 NOTE — ASSESSMENT & PLAN NOTE
Patient with documented diagnosis of Atrial Fibrillation per notes by Cardiologist Dmitriy Chavarria MD reports being compliant with the following home regimen: Apixaban 5mg BID, Amiodarone. EKG taken during admission reveals Aflutter/A Fib    Plan:  - [id number] Score: 1  - Will hold Amiodarone in setting of transaminitis.

## 2024-02-03 ENCOUNTER — ANESTHESIA EVENT (OUTPATIENT)
Dept: CARDIOLOGY | Facility: HOSPITAL | Age: 64
DRG: 193 | End: 2024-02-03
Payer: COMMERCIAL

## 2024-02-03 ENCOUNTER — ANESTHESIA (OUTPATIENT)
Dept: CARDIOLOGY | Facility: HOSPITAL | Age: 64
DRG: 193 | End: 2024-02-03
Payer: COMMERCIAL

## 2024-02-03 PROBLEM — N19 UREMIA: Status: ACTIVE | Noted: 2024-02-03

## 2024-02-03 LAB
ALBUMIN SERPL BCP-MCNC: 2 G/DL (ref 3.5–5.2)
ALP SERPL-CCNC: 81 U/L (ref 55–135)
ALT SERPL W/O P-5'-P-CCNC: 382 U/L (ref 10–44)
ANION GAP SERPL CALC-SCNC: 17 MMOL/L (ref 8–16)
AST SERPL-CCNC: 211 U/L (ref 10–40)
BASOPHILS # BLD AUTO: 0.01 K/UL (ref 0–0.2)
BASOPHILS NFR BLD: 0.3 % (ref 0–1.9)
BILIRUB SERPL-MCNC: 0.5 MG/DL (ref 0.1–1)
BM IGG SER-ACNC: <0.2 U
BUN SERPL-MCNC: 95 MG/DL (ref 8–23)
CALCIUM SERPL-MCNC: 7.7 MG/DL (ref 8.7–10.5)
CHLORIDE SERPL-SCNC: 102 MMOL/L (ref 95–110)
CO2 SERPL-SCNC: 11 MMOL/L (ref 23–29)
CREAT SERPL-MCNC: 9.4 MG/DL (ref 0.5–1.4)
DIFFERENTIAL METHOD BLD: ABNORMAL
EOSINOPHIL # BLD AUTO: 0.1 K/UL (ref 0–0.5)
EOSINOPHIL NFR BLD: 4.8 % (ref 0–8)
ERYTHROCYTE [DISTWIDTH] IN BLOOD BY AUTOMATED COUNT: 12.3 % (ref 11.5–14.5)
EST. GFR  (NO RACE VARIABLE): 6 ML/MIN/1.73 M^2
GLUCOSE SERPL-MCNC: 89 MG/DL (ref 70–110)
HCT VFR BLD AUTO: 24.5 % (ref 40–54)
HGB BLD-MCNC: 8.9 G/DL (ref 14–18)
IMM GRANULOCYTES # BLD AUTO: 0.05 K/UL (ref 0–0.04)
IMM GRANULOCYTES NFR BLD AUTO: 1.7 % (ref 0–0.5)
LYMPHOCYTES # BLD AUTO: 0.4 K/UL (ref 1–4.8)
LYMPHOCYTES NFR BLD: 13.4 % (ref 18–48)
MAGNESIUM SERPL-MCNC: 2.2 MG/DL (ref 1.6–2.6)
MCH RBC QN AUTO: 29.8 PG (ref 27–31)
MCHC RBC AUTO-ENTMCNC: 36.3 G/DL (ref 32–36)
MCV RBC AUTO: 82 FL (ref 82–98)
MONOCYTES # BLD AUTO: 0.4 K/UL (ref 0.3–1)
MONOCYTES NFR BLD: 12.4 % (ref 4–15)
NEUTROPHILS # BLD AUTO: 2 K/UL (ref 1.8–7.7)
NEUTROPHILS NFR BLD: 67.4 % (ref 38–73)
NRBC BLD-RTO: 0 /100 WBC
PHOSPHATE SERPL-MCNC: 8 MG/DL (ref 2.7–4.5)
PLATELET # BLD AUTO: 64 K/UL (ref 150–450)
PMV BLD AUTO: 12.9 FL (ref 9.2–12.9)
POTASSIUM SERPL-SCNC: 3.3 MMOL/L (ref 3.5–5.1)
PROT SERPL-MCNC: 5.1 G/DL (ref 6–8.4)
PROTEINASE3 IGG SER-ACNC: <0.2 U
RBC # BLD AUTO: 2.99 M/UL (ref 4.6–6.2)
SODIUM SERPL-SCNC: 130 MMOL/L (ref 136–145)
WBC # BLD AUTO: 2.9 K/UL (ref 3.9–12.7)

## 2024-02-03 PROCEDURE — 27201247 HC HEMODIALYSIS, SET-UP & CANCEL

## 2024-02-03 PROCEDURE — 11000001 HC ACUTE MED/SURG PRIVATE ROOM

## 2024-02-03 PROCEDURE — 25000003 PHARM REV CODE 250

## 2024-02-03 PROCEDURE — 83735 ASSAY OF MAGNESIUM: CPT

## 2024-02-03 PROCEDURE — 25000003 PHARM REV CODE 250: Performed by: STUDENT IN AN ORGANIZED HEALTH CARE EDUCATION/TRAINING PROGRAM

## 2024-02-03 PROCEDURE — 25000242 PHARM REV CODE 250 ALT 637 W/ HCPCS

## 2024-02-03 PROCEDURE — 36415 COLL VENOUS BLD VENIPUNCTURE: CPT

## 2024-02-03 PROCEDURE — 76937 US GUIDE VASCULAR ACCESS: CPT | Performed by: ANESTHESIOLOGY

## 2024-02-03 PROCEDURE — 94640 AIRWAY INHALATION TREATMENT: CPT

## 2024-02-03 PROCEDURE — 84100 ASSAY OF PHOSPHORUS: CPT

## 2024-02-03 PROCEDURE — 80053 COMPREHEN METABOLIC PANEL: CPT

## 2024-02-03 PROCEDURE — 36555 INSERT NON-TUNNEL CV CATH: CPT

## 2024-02-03 PROCEDURE — C1751 CATH, INF, PER/CENT/MIDLINE: HCPCS

## 2024-02-03 PROCEDURE — 36556 INSERT NON-TUNNEL CV CATH: CPT | Mod: ,,, | Performed by: ANESTHESIOLOGY

## 2024-02-03 PROCEDURE — 85025 COMPLETE CBC W/AUTO DIFF WBC: CPT

## 2024-02-03 PROCEDURE — 94761 N-INVAS EAR/PLS OXIMETRY MLT: CPT

## 2024-02-03 RX ORDER — MUPIROCIN 20 MG/G
OINTMENT TOPICAL 2 TIMES DAILY
Status: DISPENSED | OUTPATIENT
Start: 2024-02-03 | End: 2024-02-08

## 2024-02-03 RX ORDER — SODIUM CHLORIDE 9 MG/ML
INJECTION, SOLUTION INTRAVENOUS
Status: DISCONTINUED | OUTPATIENT
Start: 2024-02-03 | End: 2024-02-09 | Stop reason: HOSPADM

## 2024-02-03 RX ORDER — SODIUM CHLORIDE 9 MG/ML
INJECTION, SOLUTION INTRAVENOUS ONCE
Status: DISCONTINUED | OUTPATIENT
Start: 2024-02-03 | End: 2024-02-09 | Stop reason: HOSPADM

## 2024-02-03 RX ADMIN — LEVOTHYROXINE SODIUM 75 MCG: 75 TABLET ORAL at 06:02

## 2024-02-03 RX ADMIN — IPRATROPIUM BROMIDE AND ALBUTEROL SULFATE 3 ML: .5; 3 SOLUTION RESPIRATORY (INHALATION) at 07:02

## 2024-02-03 RX ADMIN — Medication 9 MG: at 08:02

## 2024-02-03 RX ADMIN — IPRATROPIUM BROMIDE AND ALBUTEROL SULFATE 3 ML: .5; 3 SOLUTION RESPIRATORY (INHALATION) at 04:02

## 2024-02-03 RX ADMIN — ISOSORBIDE MONONITRATE 60 MG: 30 TABLET, EXTENDED RELEASE ORAL at 08:02

## 2024-02-03 RX ADMIN — CARVEDILOL 25 MG: 25 TABLET, FILM COATED ORAL at 08:02

## 2024-02-03 RX ADMIN — MUPIROCIN: 20 OINTMENT TOPICAL at 08:02

## 2024-02-03 RX ADMIN — IPRATROPIUM BROMIDE AND ALBUTEROL SULFATE 3 ML: .5; 3 SOLUTION RESPIRATORY (INHALATION) at 11:02

## 2024-02-03 RX ADMIN — CARVEDILOL 25 MG: 25 TABLET, FILM COATED ORAL at 05:02

## 2024-02-03 RX ADMIN — LIDOCAINE 1 PATCH: 50 PATCH CUTANEOUS at 01:02

## 2024-02-03 RX ADMIN — IPRATROPIUM BROMIDE AND ALBUTEROL SULFATE 3 ML: .5; 3 SOLUTION RESPIRATORY (INHALATION) at 12:02

## 2024-02-03 RX ADMIN — SODIUM BICARBONATE 650 MG TABLET 1300 MG: at 08:02

## 2024-02-03 NOTE — PLAN OF CARE
Pt AAO x3.  VSS. Forgetful at times. Reinforcement needed through out shift . Renal diet info discussed with family.  Pt remained afebrile throughout this shift.   IV meds administered per order.   Pt remained free of falls this shift.   Pt with no complaints of pain this shift.  Plan of care reviewed. Patient verbalizes understanding.   Pt moving/turing with assistance. Bed bath given per PCT. Frequent weight shifting encouraged.  Patient SB  HR 55 on monitor.   Bed low, side rails up x 2, wheels locked, call light in reach.   Bed alarm maintained for safety.   Patient instructed to call for assistance.   Hourly rounding completed.   24 hour chart check completed.  Will continue to monitor.      Problem: Adult Inpatient Plan of Care  Goal: Plan of Care Review  Outcome: Ongoing, Progressing  Goal: Patient-Specific Goal (Individualized)  Outcome: Ongoing, Progressing  Goal: Absence of Hospital-Acquired Illness or Injury  Outcome: Ongoing, Progressing  Goal: Optimal Comfort and Wellbeing  Outcome: Ongoing, Progressing  Goal: Readiness for Transition of Care  Outcome: Ongoing, Progressing     Problem: Fluid and Electrolyte Imbalance (Acute Kidney Injury/Impairment)  Goal: Fluid and Electrolyte Balance  Outcome: Ongoing, Progressing

## 2024-02-03 NOTE — NURSING
Patient arrived to unit with triple lumen catheter to R IJ. Unable to be utilized for hemodialysis. Dr. Arriola notified by telephone. Orders cancelled for today. Plan for line to be exchanged and to initiate dialysis tomorrow. Patient agreeable and returned to room for today.

## 2024-02-03 NOTE — SUBJECTIVE & OBJECTIVE
Interval History: No acute events overnight. This AM, patient denies any complaints. Awaiting renal biopsy on Monday. UOP continues to decrease, 750cc past 24 hours.     Review of Systems   Constitutional:  Negative for appetite change, chills and fever.   HENT:  Negative for rhinorrhea, sore throat and trouble swallowing.    Eyes:  Negative for pain and visual disturbance.   Respiratory:  Negative for cough and shortness of breath.    Cardiovascular:  Negative for chest pain, palpitations and leg swelling.   Gastrointestinal:  Negative for abdominal pain, constipation, diarrhea, nausea and vomiting.   Genitourinary:  Negative for dysuria and hematuria.   Musculoskeletal:  Negative for gait problem and neck stiffness.   Skin:  Negative for rash and wound.   Neurological:  Negative for tremors, weakness and headaches.   Psychiatric/Behavioral:  Negative for agitation and confusion.      Objective:     Vital Signs (Most Recent):  Temp: 98.2 °F (36.8 °C) (02/03/24 0722)  Pulse: (!) 56 (02/03/24 0722)  Resp: 18 (02/03/24 0722)  BP: (!) 150/66 (02/03/24 0430)  SpO2: (!) 93 % (02/03/24 0722) Vital Signs (24h Range):  Temp:  [97.6 °F (36.4 °C)-98.9 °F (37.2 °C)] 98.2 °F (36.8 °C)  Pulse:  [52-61] 56  Resp:  [16-20] 18  SpO2:  [92 %-97 %] 93 %  BP: (140-193)/(66-87) 150/66     Weight: 79.5 kg (175 lb 4.3 oz)  Body mass index is 25.15 kg/m².    Intake/Output Summary (Last 24 hours) at 2/3/2024 0722  Last data filed at 2/3/2024 0410  Gross per 24 hour   Intake --   Output 725 ml   Net -725 ml         Physical Exam  Vitals and nursing note reviewed.   Constitutional:       General: He is not in acute distress.     Appearance: Normal appearance. He is normal weight. He is not ill-appearing or toxic-appearing.   Eyes:      Extraocular Movements: Extraocular movements intact.   Cardiovascular:      Rate and Rhythm: Normal rate and regular rhythm.      Pulses: Normal pulses.   Pulmonary:      Effort: Pulmonary effort is normal. No  respiratory distress.      Breath sounds: Wheezing present. No rhonchi or rales.   Abdominal:      General: Abdomen is flat. There is no distension.      Palpations: Abdomen is soft.      Tenderness: There is no abdominal tenderness. There is no guarding or rebound.   Musculoskeletal:         General: Normal range of motion.      Cervical back: Normal range of motion.      Right lower leg: No edema.      Left lower leg: No edema.   Skin:     General: Skin is warm.      Coloration: Skin is not jaundiced.   Neurological:      General: No focal deficit present.      Mental Status: He is alert. Mental status is at baseline.      Motor: No weakness.      Comments: No asterixis noted.  Oriented to person and place, not to time   Psychiatric:         Mood and Affect: Mood normal.         Behavior: Behavior normal.         Thought Content: Thought content normal.             Significant Labs: All pertinent labs within the past 24 hours have been reviewed.    Significant Imaging: I have reviewed all pertinent imaging results/findings within the past 24 hours.

## 2024-02-03 NOTE — ASSESSMENT & PLAN NOTE
W/ Baseline CKD  Patient with progressively changing mental status, BUN/Cr worsening   Asterixis noted on exam.    Plan:  - Nephrology consulted, appreciate recommendations.  - Urine Studies suggesting intrinsic etiology (FeNa 3.9%)  - Strict I&O  - Avoid nephrotoxins and renally dose meds  - Plan for renal biopsy Mon 2/6  - IR consulted, appreciate recommendations.   - Plan for dialysis Sat 2/3.  - Pulm consulted for IJ line placement.

## 2024-02-03 NOTE — PROGRESS NOTES
Nephrology Progress  Note     Consult Requested By: Pawan Garcia,*  Reason for Consult: BRETT on CKD4     SUBJECTIVE:        ?    Review of Systems   Constitutional:  Negative for chills and fever.   HENT:  Negative for congestion and sore throat.    Eyes:  Negative for blurred vision, double vision and photophobia.   Respiratory:  Positive for cough. Negative for shortness of breath.    Cardiovascular:  Negative for chest pain, palpitations and leg swelling.   Gastrointestinal:  Negative for abdominal pain, diarrhea, nausea and vomiting.   Genitourinary:  Negative for dysuria and urgency.   Musculoskeletal:  Negative for joint pain and myalgias.   Skin:  Negative for itching and rash.   Neurological:  Positive for weakness. Negative for dizziness, sensory change and headaches.   Endo/Heme/Allergies:  Negative for polydipsia. Does not bruise/bleed easily.   Psychiatric/Behavioral:  Negative for depression.        Past Medical History:   Diagnosis Date    Allergy     Anemia     Anticoagulant long-term use     Arthritis     CKD (chronic kidney disease) stage 4, GFR 15-29 ml/min     COPD (chronic obstructive pulmonary disease) 08/20/2021    Coronary artery disease     Heart attack 10/04/2019    Hematuria     Hemothorax     Hyperlipidemia     Hypertension     Hyperuricemia     Hypocalcemia     Hypokalemia     Hypophosphatemia     Hypothyroidism 3/2/2023    PAD (peripheral artery disease)     Proteinuria     Vitamin D deficiency           OBJECTIVE:     Vital Signs (Most Recent)  Vitals:    02/03/24 0454 02/03/24 0600 02/03/24 0722 02/03/24 0755   BP:    (!) 156/68   BP Location:    Right arm   Patient Position:    Lying   Pulse: 61  (!) 56 (!) 58   Resp: 18  18 16   Temp:   98.2 °F (36.8 °C)    TempSrc:   Oral    SpO2: (!) 93%  (!) 93% (!) 93%   Weight:  79.5 kg (175 lb 4.3 oz)     Height:             Date 02/03/24 0700 - 02/04/24 0659   Shift 4745-1467 2886-1005 8360-0654 24 Hour Total   INTAKE   Shift  Total(mL/kg)       OUTPUT   Urine(mL/kg/hr) 200   200   Shift Total(mL/kg) 200(2.5)   200(2.5)   Weight (kg) 79.5 79.5 79.5 79.5             Medications:   albuterol-ipratropium  3 mL Nebulization Q4H    carvediloL  25 mg Oral BID WM    isosorbide mononitrate  60 mg Oral Daily    levothyroxine  75 mcg Oral Before breakfast    sodium bicarbonate  1,300 mg Oral TID           Physical Exam  Vitals and nursing note reviewed.   Constitutional:       General: He is not in acute distress.     Appearance: He is not diaphoretic.   HENT:      Head: Normocephalic and atraumatic.      Mouth/Throat:      Pharynx: No oropharyngeal exudate.   Eyes:      General: No scleral icterus.     Conjunctiva/sclera: Conjunctivae normal.      Pupils: Pupils are equal, round, and reactive to light.   Cardiovascular:      Rate and Rhythm: Normal rate and regular rhythm.      Heart sounds: Normal heart sounds. No murmur heard.  Pulmonary:      Effort: Pulmonary effort is normal. No respiratory distress.      Breath sounds: Examination of the right-middle field reveals rales. Examination of the left-middle field reveals rales. Examination of the right-lower field reveals rales. Examination of the left-lower field reveals rales. Rales present.   Abdominal:      General: Bowel sounds are normal. There is no distension.      Palpations: Abdomen is soft.      Tenderness: There is no abdominal tenderness.   Musculoskeletal:         General: Normal range of motion.      Cervical back: Normal range of motion and neck supple.      Right lower leg: No edema.      Left lower leg: No edema.   Skin:     General: Skin is warm and dry.      Findings: No erythema.   Neurological:      Mental Status: He is alert and oriented to person, place, and time.      Cranial Nerves: No cranial nerve deficit.      Comments: Asterixis +   Psychiatric:         Mood and Affect: Affect normal.         Cognition and Memory: Memory normal.         Judgment: Judgment normal.          Laboratory:  Recent Labs   Lab 02/01/24  0126 02/02/24  0401 02/03/24  0257   WBC 3.27* 3.09* 2.90*   HGB 8.2* 9.1* 8.9*   HCT 24.0* 25.2* 24.5*   PLT 64* 62* 64*   MONO 5.5  0.2* 8.7  0.3 12.4  0.4   EOSINOPHIL 2.4 3.6 4.8       Recent Labs   Lab 02/01/24  0126 02/02/24  0401 02/03/24  0257   * 128* 130*   K 3.6 3.5 3.3*   CL 97 99 102   CO2 12* 12* 11*   BUN 79* 90* 95*   CREATININE 7.6* 8.9* 9.4*   CALCIUM 7.6* 7.7* 7.7*   PHOS 7.1* 8.1* 8.0*         Diagnostic Results:  X-Ray: Reviewed  US: Reviewed  Echo: Reviewed  ASSESSMENT/PLAN:     BRETT on CKD4 - Cr baseline 2.8 Proteinuria    -- Prior work up noncontributory  Only TOOTIE + non specific   -- Following with Dr Loly Payne in clinic for his CKD4  Cr trending up not clear etiology needs Kidney biopsy was on Plavix now off - presumed Kidney Biopsy Monday by IR appreciate assistance.   --   LFTs improving  but Cr trending  up since admission 3.2=>3.5=>3.9=>4.6 =>6.4 => 7.6=> 8.1=>9.4  DDx ATN, AIN, GN   CPK elevated but not to the point to cause BRETT   Decreases Plts - no schistocytes ?  Acute illness,   LFTs better,  WBCs trending down    hold off on Steroids   -- reorder extensive Serological and immunological work up just incase first one was false negative.   -- this AM  Asterixis + Uremia symptoms - place line and start Dialysis in anticipation of Kidney biopsy Monday to avoid plt disorder from uremia        Hyponatremia 128   -- Osm with in range 289   -- concerning for other protein/Urea contribution to S OSm        2. HTN   controlled   3. Anemia of chronic kidney disease    Recent Labs   Lab 02/01/24  0126 02/02/24  0401 02/03/24  0257   HGB 8.2* 9.1* 8.9*   HCT 24.0* 25.2* 24.5*   PLT 64* 62* 64*         Iron   Lab Results   Component Value Date    IRON 60 03/14/2023    TIBC 299 03/14/2023    FERRITIN 191 03/14/2023       4. 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 A-fib -  per primary   6. Nutrition/Hypoalbuminemia    Recent Labs   Lab 01/30/24  1131 01/31/24  0318 02/01/24  0126 02/02/24  0401 02/03/24  0257   LABPROT 14.0*  --  11.4  --   --    ALBUMIN  --    < > 1.9* 2.0* 2.0*    < > = values in this interval not displayed.       Nepro with meals TID.       Thank you for the consult, will follow  With any question please call [phone]  Nena Arriola MD    [facility]    ESPERANZA Payne MD,   MD BRANDY Arredondo MD E. V. Harmon, NP    [address]  [place], [address] [phone]

## 2024-02-03 NOTE — ANESTHESIA PROCEDURE NOTES
Central Line    Diagnosis: ARF  Patient location during procedure: floor  Procedure start time: 2/3/2024 1:40 PM  Timeout: 2/3/2024 1:40 PM  Procedure end time: 2/3/2024 1:50 PM    Staffing  Authorizing Provider: Chandra Luevano MD  Performing Provider: Chandra Luevano MD    Staffing  Performed: anesthesiologist   Anesthesiologist: Chandra Luevano MD  Performed by: Chandra Luevano MD  Authorized by: Chandra Luevano MD    Anesthesiologist was present at the time of the procedure.  Preanesthetic Checklist  Completed: patient identified, IV checked, site marked, risks and benefits discussed, surgical consent, monitors and equipment checked, pre-op evaluation, timeout performed and anesthesia consent given  Indication   Indication: vascular access, hemodialysis     Anesthesia   local infiltration    Central Line   Skin Prep: skin prepped with ChloraPrep, skin prep agent completely dried prior to procedure  Sterile Barriers Followed: Yes    All five maximal barriers used- gloves, gown, cap, mask, and large sterile sheet    hand hygiene performed prior to central venous catheter insertion  Location: right internal jugular.   Catheter type: triple lumen  Catheter Size: 7 Fr  Ultrasound: vascular probe with ultrasound   Vessel Caliber: large, patent, compressibility normal  Needle advanced into vessel with real time Ultrasound guidance.  Guidewire confirmed in vessel.  Image recorded and saved.  sterile gel and probe cover used in ultrasound-guided central venous catheter insertion   Manometry: Venous cannualation confirmed by visual estimation of blood vessel pressure using manometry.  Insertion Attempts: 1   Securement:line sutured, chlorhexidine patch, sterile dressing applied and blood return through all ports    Post-Procedure    Adverse Events:none      Guidewire

## 2024-02-03 NOTE — NURSING
"Patient called requesting pain medications for a pain score 9/10. I informed him that he only have a lidocaine patch available for his back and did he want me to administered it. Patient stated " No, I am not in much pain". Patient is now resting quietly in bed. Will continue to monitor.  "

## 2024-02-03 NOTE — PROGRESS NOTES
"[facility]  Progress Note    Patient Name: Domingo Gross  MRN: [id number]  Patient Class: IP- Inpatient   Admission Date: 1/27/2024  Length of Stay: 7 days  Attending Physician: Pawan Garcia,*  Primary Care Provider: Analy Encarnacion MD        Subjective:     Principal Problem:CAP (community acquired pneumonia)        HPI:  63-year-old man with PMHx of HTN, Atrial Fibrillation (sees Dr. Calderón, on Amiodarone, Apixaban), COPD (inhaler PRN), CKD (sees Dr. Payne), Hypothyroidism (Levothyroxine) presents via ambulance to [facility] on 1/27/24 for 1-day history of chest pain, cough, irregular breathing. History was collected from patient and wife, who is at bedside.    Patient reports experiencing chest pain, cough, and decreased appetite that started last night. Reports a fever of 102, did not take Tylenol. This morning, wife noticed that he was breathing "funny" and called the ambulance. Patient's boss recently tested positive for Flu/COVID. Patient emphasized that lack of food intake was due to decreased appetite and denied emesis and nausea.    Currently patient denies any chest pain or difficulty breathing. He reports taking potassium at home & being compliant with all medications. He is a former smoker and denies drinking alcohol.    In the ER, vitals were as follows - Temp 99.8 -> 102.3, HR 68, /87, O2 94%. Labs were significant for K 2.4, Magnesium 1.3, BUN/Cr 21/3.2 (baseline 2.8), AST//302, Alk phos 51 WBC 6.15, Procal 0.28. Patient was given 40mEq of potassium PO, 10mEq of KCl and admitted to [facility] for management/further workup of of Hypokalemia, BRETT, PNA, Transaminitis.    Interval History: No acute events overnight. Patient lethargic today, worsened from yesterday. Awaiting renal biopsy on Monday. UOP continues to decrease, 750cc past 24 hours.     Review of Systems   Constitutional:  Negative for appetite change, chills and fever. "   HENT:  Negative for rhinorrhea, sore throat and trouble swallowing.    Eyes:  Negative for pain and visual disturbance.   Respiratory:  Negative for cough and shortness of breath.    Cardiovascular:  Negative for chest pain, palpitations and leg swelling.   Gastrointestinal:  Negative for abdominal pain, constipation, diarrhea, nausea and vomiting.   Genitourinary:  Negative for dysuria and hematuria.   Musculoskeletal:  Negative for gait problem and neck stiffness.   Skin:  Negative for rash and wound.   Neurological:  Negative for tremors, weakness and headaches.   Psychiatric/Behavioral:  Negative for agitation and confusion.      Objective:     Vital Signs (Most Recent):  Temp: 98.2 °F (36.8 °C) (02/03/24 0722)  Pulse: (!) 56 (02/03/24 0722)  Resp: 18 (02/03/24 0722)  BP: (!) 150/66 (02/03/24 0430)  SpO2: (!) 93 % (02/03/24 0722) Vital Signs (24h Range):  Temp:  [97.6 °F (36.4 °C)-98.9 °F (37.2 °C)] 98.2 °F (36.8 °C)  Pulse:  [52-61] 56  Resp:  [16-20] 18  SpO2:  [92 %-97 %] 93 %  BP: (140-193)/(66-87) 150/66     Weight: 79.5 kg (175 lb 4.3 oz)  Body mass index is 25.15 kg/m².    Intake/Output Summary (Last 24 hours) at 2/3/2024 0722  Last data filed at 2/3/2024 0410  Gross per 24 hour   Intake --   Output 725 ml   Net -725 ml         Physical Exam  Vitals and nursing note reviewed.   Constitutional:       General: He is not in acute distress.     Appearance: Normal appearance. He is normal weight. He is not ill-appearing or toxic-appearing.   Eyes:      Extraocular Movements: Extraocular movements intact.   Cardiovascular:      Rate and Rhythm: Normal rate and regular rhythm.      Pulses: Normal pulses.   Pulmonary:      Effort: Pulmonary effort is normal. No respiratory distress.      Breath sounds: Wheezing present. No rhonchi or rales.   Abdominal:      General: Abdomen is flat. There is no distension.      Palpations: Abdomen is soft.      Tenderness: There is no abdominal tenderness. There is no guarding  or rebound.   Musculoskeletal:         General: Normal range of motion.      Cervical back: Normal range of motion.      Right lower leg: No edema.      Left lower leg: No edema.   Skin:     General: Skin is warm.      Coloration: Skin is not jaundiced.   Neurological:      General: No focal deficit present.      Mental Status: He is alert. Mental status is at baseline.      Motor: No weakness.      Comments: Bilateral asterixis noted.  Oriented to person and place, not to time   Psychiatric:         Mood and Affect: Mood normal.         Behavior: Behavior normal.         Thought Content: Thought content normal.             Significant Labs: All pertinent labs within the past 24 hours have been reviewed.    Significant Imaging: I have reviewed all pertinent imaging results/findings within the past 24 hours.    Assessment/Plan:      * BRETT (acute kidney injury)  W/ Baseline CKD  Patient with progressively changing mental status, BUN/Cr worsening   Asterixis noted on exam.    Plan:  - Nephrology consulted, appreciate recommendations.  - Urine Studies suggesting intrinsic etiology (FeNa 3.9%)  - Strict I&O  - Avoid nephrotoxins and renally dose meds  - Plan for renal biopsy Mon 2/6  - IR consulted, appreciate recommendations.   - Plan for dialysis Sat 2/3.  - Pulm consulted for IJ line placement.     Uremia  Plan in BRETT    Hyponatremia  Plan:  - Nephrology consulted, appreciate recommendations.  - Fluid restriction.    CMV (cytomegalovirus infection)  Plan:  - Quantitative CMV DNA negative.  - Not likely acute infection.    EBV infection  EBV IgM and DNA are negative  these serologies likely represent previous infection    Plan:  - ID consulted, appreciate recommendations.      CAP (community acquired pneumonia)  RESOLVED.    Acute liver failure without hepatic coma  Patient presents with AST/ALT of 481/302, (24/19 5 months ago). Denies drinking alcohol.    Plan:  Will hold PRN Tylenol  Abdominal ultrasound with no  acute abnormalities  Hepatitis panel negative  Additional work-up labs pending.    Hypothyroidism  Patient with reported TSH 5 months ago 13.669    Plan:  Repeat TSH elevated, T4 normal  Increased home levothyroxine     Persistent atrial fibrillation  Patient with documented diagnosis of Atrial Fibrillation per notes by Cardiologist Dmitriy Chavarria MD reports being compliant with the following home regimen: Apixaban 5mg BID, Amiodarone. EKG taken during admission reveals Aflutter/A Fib    Plan:  - [id number] Score: 1  - Will hold Amiodarone in setting of transaminitis.    Hyperlipidemia  Patient with known history of HLD reports being compliant with the following home regimen: Rosuvastatin 40mg QD    Plan:   - Hold statin in setting of elevated transaminitis  - Continue home equivalent Atorvastatin 80 mg QD when appropriate    HTN (hypertension)  Patient with known history of HTN reports being compliant with the following home regimen: Carvedilol 25mg BID, Eplerenone 50mg QD, Imdur 60mg QD. Patient's pressures on admission to the ER are (143-198)/(64-88) - elevated likely secondary to not taking medications before arriving to the hospital.    Plan:  - Continue home Carvedilol and Imdur  - Eplerenone not available in hospital; in setting of his hypokalemia, will use Spironolactone if additional antihypertensives are required      VTE Risk Mitigation (From admission, onward)           Ordered     IP VTE HIGH RISK PATIENT  Once         01/27/24 1240     Place sequential compression device  Until discontinued         01/27/24 1240                    Discharge Planning   FLACO:      Code Status: Full Code   Is the patient medically ready for discharge?:     Reason for patient still in hospital (select all that apply): Patient trending condition, Treatment, Consult recommendations, and Pending disposition  Discharge Plan A: Home, Home with family   Discharge Delays: None known at this time      Tisha Inman MD  [facility], PGY-2  02/03/2024

## 2024-02-03 NOTE — PLAN OF CARE
Problem: Adult Inpatient Plan of Care  Goal: Plan of Care Review  Outcome: Ongoing, Progressing     Problem: Fluid and Electrolyte Imbalance (Acute Kidney Injury/Impairment)  Goal: Fluid and Electrolyte Balance  Outcome: Ongoing, Progressing     Problem: Infection (Pneumonia)  Goal: Resolution of Infection Signs and Symptoms  Outcome: Ongoing, Progressing     Problem: Fall Injury Risk  Goal: Absence of Fall and Fall-Related Injury  Outcome: Ongoing, Progressing     Problem: Skin Injury Risk Increased  Goal: Skin Health and Integrity  Outcome: Ongoing, Progressing

## 2024-02-04 ENCOUNTER — ANESTHESIA EVENT (OUTPATIENT)
Dept: CARDIOLOGY | Facility: HOSPITAL | Age: 64
DRG: 193 | End: 2024-02-04
Payer: COMMERCIAL

## 2024-02-04 ENCOUNTER — ANESTHESIA (OUTPATIENT)
Dept: CARDIOLOGY | Facility: HOSPITAL | Age: 64
DRG: 193 | End: 2024-02-04
Payer: COMMERCIAL

## 2024-02-04 LAB
ALBUMIN SERPL BCP-MCNC: 2.1 G/DL (ref 3.5–5.2)
ALP SERPL-CCNC: 86 U/L (ref 55–135)
ALT SERPL W/O P-5'-P-CCNC: 318 U/L (ref 10–44)
ANION GAP SERPL CALC-SCNC: 15 MMOL/L (ref 8–16)
AST SERPL-CCNC: 136 U/L (ref 10–40)
BASOPHILS # BLD AUTO: 0.01 K/UL (ref 0–0.2)
BASOPHILS NFR BLD: 0.3 % (ref 0–1.9)
BILIRUB SERPL-MCNC: 0.5 MG/DL (ref 0.1–1)
BUN SERPL-MCNC: 94 MG/DL (ref 8–23)
CALCIUM SERPL-MCNC: 7.9 MG/DL (ref 8.7–10.5)
CHLORIDE SERPL-SCNC: 101 MMOL/L (ref 95–110)
CO2 SERPL-SCNC: 17 MMOL/L (ref 23–29)
CREAT SERPL-MCNC: 9.9 MG/DL (ref 0.5–1.4)
DIFFERENTIAL METHOD BLD: ABNORMAL
EOSINOPHIL # BLD AUTO: 0.2 K/UL (ref 0–0.5)
EOSINOPHIL NFR BLD: 5.2 % (ref 0–8)
ERYTHROCYTE [DISTWIDTH] IN BLOOD BY AUTOMATED COUNT: 12.5 % (ref 11.5–14.5)
EST. GFR  (NO RACE VARIABLE): 5 ML/MIN/1.73 M^2
GLUCOSE SERPL-MCNC: 120 MG/DL (ref 70–110)
HCT VFR BLD AUTO: 24.3 % (ref 40–54)
HGB BLD-MCNC: 8.7 G/DL (ref 14–18)
IMM GRANULOCYTES # BLD AUTO: 0.08 K/UL (ref 0–0.04)
IMM GRANULOCYTES NFR BLD AUTO: 2.3 % (ref 0–0.5)
LYMPHOCYTES # BLD AUTO: 0.5 K/UL (ref 1–4.8)
LYMPHOCYTES NFR BLD: 14.3 % (ref 18–48)
MAGNESIUM SERPL-MCNC: 2.3 MG/DL (ref 1.6–2.6)
MCH RBC QN AUTO: 28.8 PG (ref 27–31)
MCHC RBC AUTO-ENTMCNC: 35.8 G/DL (ref 32–36)
MCV RBC AUTO: 81 FL (ref 82–98)
MONOCYTES # BLD AUTO: 0.3 K/UL (ref 0.3–1)
MONOCYTES NFR BLD: 9.5 % (ref 4–15)
NEUTROPHILS # BLD AUTO: 2.4 K/UL (ref 1.8–7.7)
NEUTROPHILS NFR BLD: 68.4 % (ref 38–73)
NRBC BLD-RTO: 0 /100 WBC
PHOSPHATE SERPL-MCNC: 7.4 MG/DL (ref 2.7–4.5)
PLATELET # BLD AUTO: 73 K/UL (ref 150–450)
PMV BLD AUTO: 12.2 FL (ref 9.2–12.9)
POTASSIUM SERPL-SCNC: 2.8 MMOL/L (ref 3.5–5.1)
PROT SERPL-MCNC: 5.5 G/DL (ref 6–8.4)
RBC # BLD AUTO: 3.02 M/UL (ref 4.6–6.2)
SODIUM SERPL-SCNC: 133 MMOL/L (ref 136–145)
WBC # BLD AUTO: 3.49 K/UL (ref 3.9–12.7)

## 2024-02-04 PROCEDURE — 25000003 PHARM REV CODE 250: Performed by: STUDENT IN AN ORGANIZED HEALTH CARE EDUCATION/TRAINING PROGRAM

## 2024-02-04 PROCEDURE — 99900035 HC TECH TIME PER 15 MIN (STAT)

## 2024-02-04 PROCEDURE — 80053 COMPREHEN METABOLIC PANEL: CPT

## 2024-02-04 PROCEDURE — 94640 AIRWAY INHALATION TREATMENT: CPT

## 2024-02-04 PROCEDURE — 11000001 HC ACUTE MED/SURG PRIVATE ROOM

## 2024-02-04 PROCEDURE — 94761 N-INVAS EAR/PLS OXIMETRY MLT: CPT

## 2024-02-04 PROCEDURE — 36556 INSERT NON-TUNNEL CV CATH: CPT | Mod: ,,, | Performed by: UROLOGY

## 2024-02-04 PROCEDURE — 90935 HEMODIALYSIS ONE EVALUATION: CPT

## 2024-02-04 PROCEDURE — 25000003 PHARM REV CODE 250

## 2024-02-04 PROCEDURE — 83735 ASSAY OF MAGNESIUM: CPT

## 2024-02-04 PROCEDURE — 36415 COLL VENOUS BLD VENIPUNCTURE: CPT

## 2024-02-04 PROCEDURE — 85025 COMPLETE CBC W/AUTO DIFF WBC: CPT

## 2024-02-04 PROCEDURE — 84100 ASSAY OF PHOSPHORUS: CPT

## 2024-02-04 PROCEDURE — 25000242 PHARM REV CODE 250 ALT 637 W/ HCPCS

## 2024-02-04 RX ORDER — POTASSIUM CHLORIDE 20 MEQ/1
40 TABLET, EXTENDED RELEASE ORAL ONCE
Status: COMPLETED | OUTPATIENT
Start: 2024-02-04 | End: 2024-02-04

## 2024-02-04 RX ORDER — HYDRALAZINE HYDROCHLORIDE 25 MG/1
25 TABLET, FILM COATED ORAL ONCE
Status: COMPLETED | OUTPATIENT
Start: 2024-02-04 | End: 2024-02-04

## 2024-02-04 RX ORDER — AMLODIPINE BESYLATE 5 MG/1
10 TABLET ORAL ONCE
Status: DISCONTINUED | OUTPATIENT
Start: 2024-02-04 | End: 2024-02-04

## 2024-02-04 RX ADMIN — MUPIROCIN: 20 OINTMENT TOPICAL at 09:02

## 2024-02-04 RX ADMIN — LEVOTHYROXINE SODIUM 75 MCG: 75 TABLET ORAL at 06:02

## 2024-02-04 RX ADMIN — MUPIROCIN: 20 OINTMENT TOPICAL at 08:02

## 2024-02-04 RX ADMIN — CARVEDILOL 25 MG: 25 TABLET, FILM COATED ORAL at 04:02

## 2024-02-04 RX ADMIN — SODIUM BICARBONATE 650 MG TABLET 1300 MG: at 09:02

## 2024-02-04 RX ADMIN — CARVEDILOL 25 MG: 25 TABLET, FILM COATED ORAL at 08:02

## 2024-02-04 RX ADMIN — SODIUM BICARBONATE 650 MG TABLET 1300 MG: at 04:02

## 2024-02-04 RX ADMIN — POTASSIUM CHLORIDE 40 MEQ: 1500 TABLET, EXTENDED RELEASE ORAL at 06:02

## 2024-02-04 RX ADMIN — IPRATROPIUM BROMIDE AND ALBUTEROL SULFATE 3 ML: .5; 3 SOLUTION RESPIRATORY (INHALATION) at 04:02

## 2024-02-04 RX ADMIN — POTASSIUM CHLORIDE 40 MEQ: 1500 TABLET, EXTENDED RELEASE ORAL at 01:02

## 2024-02-04 RX ADMIN — HYDRALAZINE HYDROCHLORIDE 25 MG: 25 TABLET, FILM COATED ORAL at 11:02

## 2024-02-04 RX ADMIN — IPRATROPIUM BROMIDE AND ALBUTEROL SULFATE 3 ML: .5; 3 SOLUTION RESPIRATORY (INHALATION) at 11:02

## 2024-02-04 RX ADMIN — SODIUM BICARBONATE 650 MG TABLET 1300 MG: at 08:02

## 2024-02-04 RX ADMIN — Medication 9 MG: at 09:02

## 2024-02-04 RX ADMIN — IPRATROPIUM BROMIDE AND ALBUTEROL SULFATE 3 ML: .5; 3 SOLUTION RESPIRATORY (INHALATION) at 12:02

## 2024-02-04 RX ADMIN — ISOSORBIDE MONONITRATE 60 MG: 30 TABLET, EXTENDED RELEASE ORAL at 08:02

## 2024-02-04 RX ADMIN — IPRATROPIUM BROMIDE AND ALBUTEROL SULFATE 3 ML: .5; 3 SOLUTION RESPIRATORY (INHALATION) at 08:02

## 2024-02-04 NOTE — PROGRESS NOTES
Nephrology Progress  Note     Consult Requested By: Pawan Garcia,*  Reason for Consult: BRETT on CKD4     SUBJECTIVE:        ?    Review of Systems   Constitutional:  Negative for chills and fever.   HENT:  Negative for congestion and sore throat.    Eyes:  Negative for blurred vision, double vision and photophobia.   Respiratory:  Negative for cough and shortness of breath.    Cardiovascular:  Negative for chest pain, palpitations and leg swelling.   Gastrointestinal:  Negative for abdominal pain, diarrhea, nausea and vomiting.   Genitourinary:  Negative for dysuria and urgency.   Musculoskeletal:  Negative for joint pain and myalgias.   Skin:  Negative for itching and rash.   Neurological:  Positive for weakness. Negative for dizziness, sensory change and headaches.   Endo/Heme/Allergies:  Negative for polydipsia. Does not bruise/bleed easily.   Psychiatric/Behavioral:  Negative for depression.        Past Medical History:   Diagnosis Date    Allergy     Anemia     Anticoagulant long-term use     Arthritis     CKD (chronic kidney disease) stage 4, GFR 15-29 ml/min     COPD (chronic obstructive pulmonary disease) 08/20/2021    Coronary artery disease     Heart attack 10/04/2019    Hematuria     Hemothorax     Hyperlipidemia     Hypertension     Hyperuricemia     Hypocalcemia     Hypokalemia     Hypophosphatemia     Hypothyroidism 3/2/2023    PAD (peripheral artery disease)     Proteinuria     Vitamin D deficiency           OBJECTIVE:     Vital Signs (Most Recent)  Vitals:    02/04/24 0357 02/04/24 0450 02/04/24 0817 02/04/24 0847   BP:   (!) 192/85    BP Location:   Left arm    Patient Position:   Lying    Pulse: (!) 55 71 (!) 56 (!) 56   Resp:  18 18 16   Temp:   97.7 °F (36.5 °C)    TempSrc:   Axillary    SpO2:  (!) 94% 97% (!) 94%   Weight:       Height:             Date 02/04/24 0700 - 02/05/24 0659   Shift 6784-9215 8231-7928 0409-4292 24 Hour Total   INTAKE   P.O. 125   125   Shift Total(mL/kg)  125(1.7)   125(1.7)   OUTPUT   Shift Total(mL/kg)       Weight (kg) 75.1 75.1 75.1 75.1             Medications:   sodium chloride 0.9%   Intravenous Once    albuterol-ipratropium  3 mL Nebulization Q4H    carvediloL  25 mg Oral BID WM    isosorbide mononitrate  60 mg Oral Daily    levothyroxine  75 mcg Oral Before breakfast    mupirocin   Nasal BID    potassium chloride  40 mEq Oral Once    sodium bicarbonate  1,300 mg Oral TID           Physical Exam  Vitals and nursing note reviewed.   Constitutional:       General: He is not in acute distress.     Appearance: He is not diaphoretic.   HENT:      Head: Normocephalic and atraumatic.      Mouth/Throat:      Pharynx: No oropharyngeal exudate.   Eyes:      General: No scleral icterus.     Conjunctiva/sclera: Conjunctivae normal.      Pupils: Pupils are equal, round, and reactive to light.   Cardiovascular:      Rate and Rhythm: Normal rate and regular rhythm.      Heart sounds: Normal heart sounds. No murmur heard.  Pulmonary:      Effort: Pulmonary effort is normal. No respiratory distress.      Breath sounds: Examination of the right-middle field reveals rales. Examination of the left-middle field reveals rales. Examination of the right-lower field reveals rales. Examination of the left-lower field reveals rales. Rales present.   Abdominal:      General: Bowel sounds are normal. There is no distension.      Palpations: Abdomen is soft.      Tenderness: There is no abdominal tenderness.   Musculoskeletal:         General: Normal range of motion.      Cervical back: Normal range of motion and neck supple.      Right lower leg: No edema.      Left lower leg: No edema.   Skin:     General: Skin is warm and dry.      Findings: No erythema.   Neurological:      Mental Status: He is alert and oriented to person, place, and time.      Cranial Nerves: No cranial nerve deficit.      Comments: Asterixis +   Psychiatric:         Mood and Affect: Affect normal.         Cognition  and Memory: Memory normal.         Judgment: Judgment normal.         Laboratory:  Recent Labs   Lab 02/02/24  0401 02/03/24 0257 02/04/24  0345   WBC 3.09* 2.90* 3.49*   HGB 9.1* 8.9* 8.7*   HCT 25.2* 24.5* 24.3*   PLT 62* 64* 73*   MONO 8.7  0.3 12.4  0.4 9.5  0.3   EOSINOPHIL 3.6 4.8 5.2       Recent Labs   Lab 02/02/24 0401 02/03/24 0257 02/04/24  0345   * 130* 133*   K 3.5 3.3* 2.8*   CL 99 102 101   CO2 12* 11* 17*   BUN 90* 95* 94*   CREATININE 8.9* 9.4* 9.9*   CALCIUM 7.7* 7.7* 7.9*   PHOS 8.1* 8.0* 7.4*         Diagnostic Results:  X-Ray: Reviewed  US: Reviewed  Echo: Reviewed  ASSESSMENT/PLAN:     BRETT on CKD4 - Cr baseline 2.8 Proteinuria    -- Prior work up noncontributory  Only TOOTIE + non specific   -- Following with Dr Loly Payne in clinic for his CKD4  Cr trending up not clear etiology needs Kidney biopsy was on Plavix now off - presumed Kidney Biopsy Monday by IR appreciate assistance.   --   LFTs improving  but Cr trending  up since admission 3.2=>3.5=>3.9=>4.6 =>6.4 => 7.6=> 8.1=>9.4 =>9.9   DDx ATN, AIN, GN   Decreases Plts - no schistocytes ?  Acute illness,   LFTs better,  WBCs trending down    hold off on Steroids   -- reorder extensive Serological and immunological work up just incase first one was false negative.   -- Cr seems slowing down trending up today less Asterixis   -- Has Trialysis  line now start Dialysis in anticipation of Kidney biopsy Monday to avoid plt disorder from uremia  Good UOP         Hyponatremia 128   -- Osm with in range 289   -- concerning for other protein/Urea contribution to S OSm        2. HTN   controlled   3. Anemia of chronic kidney disease    Recent Labs   Lab 02/02/24 0401 02/03/24 0257 02/04/24  0345   HGB 9.1* 8.9* 8.7*   HCT 25.2* 24.5* 24.3*   PLT 62* 64* 73*         Iron   Lab Results   Component Value Date    IRON 60 03/14/2023    TIBC 299 03/14/2023    FERRITIN 191 03/14/2023       4. MBD (E88.9 M90.80) - PTH at goal   Recent Labs   Lab  02/04/24  0345   CALCIUM 7.9*   PHOS 7.4*       Recent Labs   Lab 02/02/24  0401 02/03/24  0257 02/04/24  0345   MG 2.2 2.2 2.3         Lab Results   Component Value Date    PTH 60.6 08/11/2021    CALCIUM 7.9 (L) 02/04/2024    CAION 1.31 07/14/2020    PHOS 7.4 (H) 02/04/2024     Lab Results   Component Value Date    [id number] 42 08/11/2021       Lab Results   Component Value Date    CO2 17 (L) 02/04/2024       5. A-fib -  per primary   6. Nutrition/Hypoalbuminemia    Recent Labs   Lab 01/30/24  1131 01/31/24  0318 02/01/24  0126 02/02/24  0401 02/03/24  0257 02/04/24  0345   LABPROT 14.0*  --  11.4  --   --   --    ALBUMIN  --    < > 1.9*   < > 2.0* 2.1*    < > = values in this interval not displayed.       Nepro with meals TID.       Thank you for the consult, will follow  With any question please call [phone]  Nena Arriola MD    [facility]    ESPERANZA Payne MD,   MD BRANDY Arredondo MD E. V. Harmon, NP    [address]  [place], [address]  [phone]

## 2024-02-04 NOTE — PROGRESS NOTES
"[facility]  Progress Note    Patient Name: Domingo Gross  MRN: [id number]  Patient Class: IP- Inpatient   Admission Date: 1/27/2024  Length of Stay: 8 days  Attending Physician: Pawan Garcia,*  Primary Care Provider: Analy Encarnacion MD        Subjective:     Principal Problem:BRETT (acute kidney injury)        HPI:  63-year-old man with PMHx of HTN, Atrial Fibrillation (sees Dr. Calderón, on Amiodarone, Apixaban), COPD (inhaler PRN), CKD (sees Dr. Payne), Hypothyroidism (Levothyroxine) presents via ambulance to [facility] on 1/27/24 for 1-day history of chest pain, cough, irregular breathing. History was collected from patient and wife, who is at bedside.    Patient reports experiencing chest pain, cough, and decreased appetite that started last night. Reports a fever of 102, did not take Tylenol. This morning, wife noticed that he was breathing "funny" and called the ambulance. Patient's boss recently tested positive for Flu/COVID. Patient emphasized that lack of food intake was due to decreased appetite and denied emesis and nausea.    Currently patient denies any chest pain or difficulty breathing. He reports taking potassium at home & being compliant with all medications. He is a former smoker and denies drinking alcohol.    In the ER, vitals were as follows - Temp 99.8 -> 102.3, HR 68, /87, O2 94%. Labs were significant for K 2.4, Magnesium 1.3, BUN/Cr 21/3.2 (baseline 2.8), AST//302, Alk phos 51 WBC 6.15, Procal 0.28. Patient was given 40mEq of potassium PO, 10mEq of KCl and admitted to [facility] for management/further workup of of Hypokalemia, BRETT, PNA, Transaminitis.    Overview/Hospital Course:  No notes on file    Interval History: Triple lumen cath placed yesterday by anesthlogy. Unable to be used for HD. Needs to be exchanged with trialysis. adverse events overnight.      Review of Systems   Constitutional:  Negative for appetite change, " chills and fever.   HENT:  Negative for rhinorrhea, sore throat and trouble swallowing.    Eyes:  Negative for pain and visual disturbance.   Respiratory:  Negative for cough and shortness of breath.    Cardiovascular:  Negative for chest pain, palpitations and leg swelling.   Gastrointestinal:  Negative for abdominal pain, constipation, diarrhea, nausea and vomiting.   Genitourinary:  Negative for dysuria and hematuria.   Musculoskeletal:  Negative for gait problem and neck stiffness.   Skin:  Negative for rash and wound.   Neurological:  Negative for tremors, weakness and headaches.   Psychiatric/Behavioral:  Negative for agitation and confusion.      Objective:     Vital Signs (Most Recent):  Temp: 97.7 °F (36.5 °C) (02/04/24 0817)  Pulse: (!) 56 (02/04/24 0817)  Resp: 18 (02/04/24 0817)  BP: (!) 192/85 (02/04/24 0817)  SpO2: 97 % (02/04/24 0817) Vital Signs (24h Range):  Temp:  [97 °F (36.1 °C)-98 °F (36.7 °C)] 97.7 °F (36.5 °C)  Pulse:  [52-71] 56  Resp:  [16-20] 18  SpO2:  [91 %-100 %] 97 %  BP: (152-201)/(71-89) 192/85     Weight: 75.1 kg (165 lb 9.1 oz)  Body mass index is 23.76 kg/m².    Intake/Output Summary (Last 24 hours) at 2/4/2024 0840  Last data filed at 2/4/2024 0644  Gross per 24 hour   Intake --   Output 1800 ml   Net -1800 ml         Physical Exam  Vitals and nursing note reviewed.   Constitutional:       General: He is not in acute distress.     Appearance: Normal appearance. He is normal weight. He is not ill-appearing or toxic-appearing.   HENT:      Head: Normocephalic.      Right Ear: External ear normal.      Left Ear: External ear normal.      Nose: Nose normal.      Mouth/Throat:      Pharynx: Oropharynx is clear.   Eyes:      Extraocular Movements: Extraocular movements intact.   Cardiovascular:      Rate and Rhythm: Normal rate and regular rhythm.      Pulses: Normal pulses.   Pulmonary:      Effort: Pulmonary effort is normal. No respiratory distress.      Breath sounds: No wheezing,  rhonchi or rales.   Abdominal:      General: Abdomen is flat. There is no distension.      Palpations: Abdomen is soft.      Tenderness: There is no abdominal tenderness. There is no guarding or rebound.   Musculoskeletal:         General: Normal range of motion.      Cervical back: Normal range of motion.      Right lower leg: No edema.      Left lower leg: No edema.   Skin:     General: Skin is warm.      Coloration: Skin is not jaundiced.   Neurological:      General: No focal deficit present.      Mental Status: He is alert. Mental status is at baseline.      Motor: No weakness.      Comments: No asterixis noted.  Oriented to person and place, not to time   Psychiatric:         Mood and Affect: Mood normal.         Behavior: Behavior normal.         Thought Content: Thought content normal.             Significant Labs: All pertinent labs within the past 24 hours have been reviewed.  CBC:   Recent Labs   Lab 02/03/24 0257 02/04/24  0345   WBC 2.90* 3.49*   HGB 8.9* 8.7*   HCT 24.5* 24.3*   PLT 64* 73*     CMP:   Recent Labs   Lab 02/03/24 0257 02/04/24  0345   * 133*   K 3.3* 2.8*    101   CO2 11* 17*   GLU 89 120*   BUN 95* 94*   CREATININE 9.4* 9.9*   CALCIUM 7.7* 7.9*   PROT 5.1* 5.5*   ALBUMIN 2.0* 2.1*   BILITOT 0.5 0.5   ALKPHOS 81 86   * 136*   * 318*   ANIONGAP 17* 15       Significant Imaging: I have reviewed all pertinent imaging results/findings within the past 24 hours.    Assessment/Plan:      * BRETT (acute kidney injury)  W/ Baseline CKD  Patient with progressively changing mental status, BUN/Cr worsening   Asterixis noted on exam.    Plan:  - Nephrology consulted, appreciate recommendations.  - Urine Studies suggesting intrinsic etiology (FeNa 3.9%)  - Strict I&O  - Avoid nephrotoxins and renally dose meds  - Plan for renal biopsy Mon 2/6  - IR consulted, appreciate recommendations.   - Plan for dialysis Sat 2/3.  - Pulm consulted for IJ line placement.     Uremia  Plan  in BRETT    Hyponatremia  IMPROVING    Plan:  - Nephrology consulted, appreciate recommendations.  - Fluid restriction.    CMV (cytomegalovirus infection)  Plan:  - Quantitative CMV DNA negative.  - Not likely acute infection.    EBV infection  EBV IgM and DNA are negative  these serologies likely represent previous infection    Plan:  - ID consulted, appreciate recommendations.      CAP (community acquired pneumonia)  RESOLVED.    Acute liver failure without hepatic coma  Patient presents with AST/ALT of 481/302, (24/19 5 months ago). Denies drinking alcohol.    Plan:  Will hold PRN Tylenol  Abdominal ultrasound with no acute abnormalities  Hepatitis panel negative  Additional work-up labs pending.    Hypothyroidism  Patient with reported TSH 5 months ago 13.669    Plan:  Repeat TSH elevated, T4 normal  Increased home levothyroxine     Persistent atrial fibrillation  Patient with documented diagnosis of Atrial Fibrillation per notes by Cardiologist Dmitriy Chavarria MD reports being compliant with the following home regimen: Apixaban 5mg BID, Amiodarone. EKG taken during admission reveals Aflutter/A Fib    Plan:  - [id number] Score: 1  - Will hold Amiodarone in setting of transaminitis.    Hyperlipidemia  Patient with known history of HLD reports being compliant with the following home regimen: Rosuvastatin 40mg QD    Plan:   - Hold statin in setting of elevated transaminitis  - Continue home equivalent Atorvastatin 80 mg QD when appropriate    HTN (hypertension)  Patient with known history of HTN reports being compliant with the following home regimen: Carvedilol 25mg BID, Eplerenone 50mg QD, Imdur 60mg QD. Patient's pressures on admission to the ER are (143-198)/(64-88) - elevated likely secondary to not taking medications before arriving to the hospital.    Plan:  - Continue home Carvedilol and Imdur  - Eplerenone not available in hospital; in setting of his hypokalemia, will use Spironolactone if additional  antihypertensives are required      VTE Risk Mitigation (From admission, onward)           Ordered     IP VTE HIGH RISK PATIENT  Once         01/27/24 1240     Place sequential compression device  Until discontinued         01/27/24 1240                    Discharge Planning   FLACO:      Code Status: Full Code   Is the patient medically ready for discharge?:     Reason for patient still in hospital (select all that apply): Patient trending condition, Laboratory test, Treatment, Consult recommendations, and Pending disposition  Discharge Plan A: Home, Home with family   Discharge Delays: None known at this time    ________________________  Jdue Sanford MD  [facility] PGY-2

## 2024-02-04 NOTE — ANESTHESIA PROCEDURE NOTES
Dialysis Catheter    Diagnosis: Uremia  Patient location during procedure: floor  Procedure start time: 2/4/2024 10:15 AM  Timeout: 2/4/2024 10:15 AM  Procedure end time: 2/4/2024 10:30 AM    Staffing  Authorizing Provider: Anibal Zurita MD  Performing Provider: Anibal Zurita MD    Staffing  Performed: anesthesiologist   Anesthesiologist: Anibal Zurita MD  Performed by: Anibal Zurita MD  Authorized by: Anibal Zurita MD    Anesthesiologist was present at the time of the procedure.  Preanesthetic Checklist  Completed: patient identified, IV checked, site marked, risks and benefits discussed, surgical consent, monitors and equipment checked, pre-op evaluation, timeout performed and anesthesia consent given  Indication   Indication: hemodialysis     Anesthesia     Central Line   Skin Prep: skin prepped with ChloraPrep, skin prep agent completely dried prior to procedure  Sterile Barriers Followed: Yes    All five maximal barriers used- gloves, gown, cap, mask, and large sterile sheet    hand hygiene performed prior to central venous catheter insertion  Location: right internal jugular.   Catheter type: triple lumen  Catheter Size: 14 Fr  Inserted Catheter Length: 15 cm  Ultrasound: none     Manometry: none  Insertion Attempts: 1   Securement:line sutured, sterile dressing applied, chlorhexidine patch and blood return through all ports    Post-Procedure   X-Ray: successful placement   Adverse Events:none      Guidewire Guidewire removed intact.   Additional Notes  Existing RIJ TLC in place from less than 24 hours ago  Catheter exchanged over a wire  May be used immediately

## 2024-02-04 NOTE — SUBJECTIVE & OBJECTIVE
Interval History: Triple lumen cath placed yesterday by anesthlogy. Unable to be used for HD. Needs to be exchanged with trialysis. adverse events overnight.      Review of Systems   Constitutional:  Negative for appetite change, chills and fever.   HENT:  Negative for rhinorrhea, sore throat and trouble swallowing.    Eyes:  Negative for pain and visual disturbance.   Respiratory:  Negative for cough and shortness of breath.    Cardiovascular:  Negative for chest pain, palpitations and leg swelling.   Gastrointestinal:  Negative for abdominal pain, constipation, diarrhea, nausea and vomiting.   Genitourinary:  Negative for dysuria and hematuria.   Musculoskeletal:  Negative for gait problem and neck stiffness.   Skin:  Negative for rash and wound.   Neurological:  Negative for tremors, weakness and headaches.   Psychiatric/Behavioral:  Negative for agitation and confusion.      Objective:     Vital Signs (Most Recent):  Temp: 97.7 °F (36.5 °C) (02/04/24 0817)  Pulse: (!) 56 (02/04/24 0817)  Resp: 18 (02/04/24 0817)  BP: (!) 192/85 (02/04/24 0817)  SpO2: 97 % (02/04/24 0817) Vital Signs (24h Range):  Temp:  [97 °F (36.1 °C)-98 °F (36.7 °C)] 97.7 °F (36.5 °C)  Pulse:  [52-71] 56  Resp:  [16-20] 18  SpO2:  [91 %-100 %] 97 %  BP: (152-201)/(71-89) 192/85     Weight: 75.1 kg (165 lb 9.1 oz)  Body mass index is 23.76 kg/m².    Intake/Output Summary (Last 24 hours) at 2/4/2024 0840  Last data filed at 2/4/2024 0644  Gross per 24 hour   Intake --   Output 1800 ml   Net -1800 ml         Physical Exam  Vitals and nursing note reviewed.   Constitutional:       General: He is not in acute distress.     Appearance: Normal appearance. He is normal weight. He is not ill-appearing or toxic-appearing.   HENT:      Head: Normocephalic.      Right Ear: External ear normal.      Left Ear: External ear normal.      Nose: Nose normal.      Mouth/Throat:      Pharynx: Oropharynx is clear.   Eyes:      Extraocular Movements: Extraocular  movements intact.   Cardiovascular:      Rate and Rhythm: Normal rate and regular rhythm.      Pulses: Normal pulses.   Pulmonary:      Effort: Pulmonary effort is normal. No respiratory distress.      Breath sounds: No wheezing, rhonchi or rales.   Abdominal:      General: Abdomen is flat. There is no distension.      Palpations: Abdomen is soft.      Tenderness: There is no abdominal tenderness. There is no guarding or rebound.   Musculoskeletal:         General: Normal range of motion.      Cervical back: Normal range of motion.      Right lower leg: No edema.      Left lower leg: No edema.   Skin:     General: Skin is warm.      Coloration: Skin is not jaundiced.   Neurological:      General: No focal deficit present.      Mental Status: He is alert. Mental status is at baseline.      Motor: No weakness.      Comments: No asterixis noted.  Oriented to person and place, not to time   Psychiatric:         Mood and Affect: Mood normal.         Behavior: Behavior normal.         Thought Content: Thought content normal.             Significant Labs: All pertinent labs within the past 24 hours have been reviewed.  CBC:   Recent Labs   Lab 02/03/24 0257 02/04/24  0345   WBC 2.90* 3.49*   HGB 8.9* 8.7*   HCT 24.5* 24.3*   PLT 64* 73*     CMP:   Recent Labs   Lab 02/03/24  0257 02/04/24  0345   * 133*   K 3.3* 2.8*    101   CO2 11* 17*   GLU 89 120*   BUN 95* 94*   CREATININE 9.4* 9.9*   CALCIUM 7.7* 7.9*   PROT 5.1* 5.5*   ALBUMIN 2.0* 2.1*   BILITOT 0.5 0.5   ALKPHOS 81 86   * 136*   * 318*   ANIONGAP 17* 15       Significant Imaging: I have reviewed all pertinent imaging results/findings within the past 24 hours.

## 2024-02-04 NOTE — ASSESSMENT & PLAN NOTE
Patient with documented diagnosis of Atrial Fibrillation per notes by Cardiologist Dmitriy Chavarria MD reports being compliant with the following home regimen: Apixaban 5mg BID, Amiodarone. EKG taken during admission reveals Aflutter/A Fib    Plan:  - [id number] Score: 1  - Will hold Amiodarone in setting of transaminitis.

## 2024-02-05 LAB
ALBUMIN SERPL BCP-MCNC: 2 G/DL (ref 3.5–5.2)
ALP SERPL-CCNC: 81 U/L (ref 55–135)
ALT SERPL W/O P-5'-P-CCNC: 240 U/L (ref 10–44)
ANCA AB TITR SER IF: NORMAL TITER
ANION GAP SERPL CALC-SCNC: 11 MMOL/L (ref 8–16)
AST SERPL-CCNC: 98 U/L (ref 10–40)
BASOPHILS # BLD AUTO: 0.01 K/UL (ref 0–0.2)
BASOPHILS NFR BLD: 0.3 % (ref 0–1.9)
BILIRUB SERPL-MCNC: 0.5 MG/DL (ref 0.1–1)
BUN SERPL-MCNC: 53 MG/DL (ref 8–23)
CALCIUM SERPL-MCNC: 7.8 MG/DL (ref 8.7–10.5)
CHLORIDE SERPL-SCNC: 105 MMOL/L (ref 95–110)
CO2 SERPL-SCNC: 23 MMOL/L (ref 23–29)
CREAT SERPL-MCNC: 6.6 MG/DL (ref 0.5–1.4)
DIFFERENTIAL METHOD BLD: ABNORMAL
EOSINOPHIL # BLD AUTO: 0.1 K/UL (ref 0–0.5)
EOSINOPHIL NFR BLD: 4.4 % (ref 0–8)
ERYTHROCYTE [DISTWIDTH] IN BLOOD BY AUTOMATED COUNT: 12.7 % (ref 11.5–14.5)
EST. GFR  (NO RACE VARIABLE): 9 ML/MIN/1.73 M^2
GLUCOSE SERPL-MCNC: 91 MG/DL (ref 70–110)
HBV SURFACE AB SER-ACNC: <3 MIU/ML
HBV SURFACE AB SER-ACNC: NORMAL M[IU]/ML
HCT VFR BLD AUTO: 23.5 % (ref 40–54)
HGB BLD-MCNC: 8.5 G/DL (ref 14–18)
IMM GRANULOCYTES # BLD AUTO: 0.04 K/UL (ref 0–0.04)
IMM GRANULOCYTES NFR BLD AUTO: 1.3 % (ref 0–0.5)
LYMPHOCYTES # BLD AUTO: 0.6 K/UL (ref 1–4.8)
LYMPHOCYTES NFR BLD: 20.1 % (ref 18–48)
MAGNESIUM SERPL-MCNC: 1.9 MG/DL (ref 1.6–2.6)
MCH RBC QN AUTO: 29.1 PG (ref 27–31)
MCHC RBC AUTO-ENTMCNC: 36.2 G/DL (ref 32–36)
MCV RBC AUTO: 81 FL (ref 82–98)
MONOCYTES # BLD AUTO: 0.5 K/UL (ref 0.3–1)
MONOCYTES NFR BLD: 15.8 % (ref 4–15)
MYELOPEROXIDASE AB SER-ACNC: 0.2 U/ML
NEUTROPHILS # BLD AUTO: 1.7 K/UL (ref 1.8–7.7)
NEUTROPHILS NFR BLD: 58.1 % (ref 38–73)
NRBC BLD-RTO: 0 /100 WBC
P-ANCA TITR SER IF: NORMAL TITER
PATHOLOGIST INTERPRETATION IFE: NORMAL
PATHOLOGIST INTERPRETATION SPE: NORMAL
PHOSPHATE SERPL-MCNC: 4.5 MG/DL (ref 2.7–4.5)
PLATELET # BLD AUTO: 75 K/UL (ref 150–450)
PMV BLD AUTO: 11.7 FL (ref 9.2–12.9)
POTASSIUM SERPL-SCNC: 3.1 MMOL/L (ref 3.5–5.1)
PROT SERPL-MCNC: 5.1 G/DL (ref 6–8.4)
RBC # BLD AUTO: 2.92 M/UL (ref 4.6–6.2)
SODIUM SERPL-SCNC: 139 MMOL/L (ref 136–145)
T PALLIDUM AB SER QL IF: ABNORMAL
WBC # BLD AUTO: 2.98 K/UL (ref 3.9–12.7)

## 2024-02-05 PROCEDURE — 99900035 HC TECH TIME PER 15 MIN (STAT)

## 2024-02-05 PROCEDURE — 83735 ASSAY OF MAGNESIUM: CPT

## 2024-02-05 PROCEDURE — 80053 COMPREHEN METABOLIC PANEL: CPT

## 2024-02-05 PROCEDURE — 94640 AIRWAY INHALATION TREATMENT: CPT

## 2024-02-05 PROCEDURE — 25000242 PHARM REV CODE 250 ALT 637 W/ HCPCS

## 2024-02-05 PROCEDURE — 85025 COMPLETE CBC W/AUTO DIFF WBC: CPT

## 2024-02-05 PROCEDURE — 25000003 PHARM REV CODE 250

## 2024-02-05 PROCEDURE — 36415 COLL VENOUS BLD VENIPUNCTURE: CPT

## 2024-02-05 PROCEDURE — 94761 N-INVAS EAR/PLS OXIMETRY MLT: CPT

## 2024-02-05 PROCEDURE — 84100 ASSAY OF PHOSPHORUS: CPT

## 2024-02-05 PROCEDURE — 86706 HEP B SURFACE ANTIBODY: CPT | Mod: 91 | Performed by: HOSPITALIST

## 2024-02-05 PROCEDURE — 25000003 PHARM REV CODE 250: Performed by: STUDENT IN AN ORGANIZED HEALTH CARE EDUCATION/TRAINING PROGRAM

## 2024-02-05 PROCEDURE — 11000001 HC ACUTE MED/SURG PRIVATE ROOM

## 2024-02-05 PROCEDURE — 86706 HEP B SURFACE ANTIBODY: CPT | Performed by: HOSPITALIST

## 2024-02-05 RX ORDER — ISOSORBIDE MONONITRATE 30 MG/1
60 TABLET, EXTENDED RELEASE ORAL DAILY
Status: DISCONTINUED | OUTPATIENT
Start: 2024-02-05 | End: 2024-02-07

## 2024-02-05 RX ORDER — POTASSIUM CHLORIDE 20 MEQ/1
40 TABLET, EXTENDED RELEASE ORAL EVERY 4 HOURS
Status: COMPLETED | OUTPATIENT
Start: 2024-02-05 | End: 2024-02-05

## 2024-02-05 RX ORDER — CARVEDILOL 25 MG/1
25 TABLET ORAL 2 TIMES DAILY WITH MEALS
Status: DISCONTINUED | OUTPATIENT
Start: 2024-02-05 | End: 2024-02-09 | Stop reason: HOSPADM

## 2024-02-05 RX ORDER — POTASSIUM CHLORIDE 20 MEQ/1
40 TABLET, EXTENDED RELEASE ORAL ONCE
Status: DISCONTINUED | OUTPATIENT
Start: 2024-02-05 | End: 2024-02-07

## 2024-02-05 RX ADMIN — IPRATROPIUM BROMIDE AND ALBUTEROL SULFATE 3 ML: .5; 3 SOLUTION RESPIRATORY (INHALATION) at 04:02

## 2024-02-05 RX ADMIN — IPRATROPIUM BROMIDE AND ALBUTEROL SULFATE 3 ML: .5; 3 SOLUTION RESPIRATORY (INHALATION) at 07:02

## 2024-02-05 RX ADMIN — POTASSIUM CHLORIDE 40 MEQ: 1500 TABLET, EXTENDED RELEASE ORAL at 06:02

## 2024-02-05 RX ADMIN — IPRATROPIUM BROMIDE AND ALBUTEROL SULFATE 3 ML: .5; 3 SOLUTION RESPIRATORY (INHALATION) at 12:02

## 2024-02-05 RX ADMIN — SODIUM BICARBONATE 650 MG TABLET 1300 MG: at 08:02

## 2024-02-05 RX ADMIN — Medication 9 MG: at 08:02

## 2024-02-05 RX ADMIN — CARVEDILOL 25 MG: 25 TABLET, FILM COATED ORAL at 05:02

## 2024-02-05 RX ADMIN — LEVOTHYROXINE SODIUM 75 MCG: 75 TABLET ORAL at 06:02

## 2024-02-05 RX ADMIN — ISOSORBIDE MONONITRATE 60 MG: 30 TABLET, EXTENDED RELEASE ORAL at 06:02

## 2024-02-05 RX ADMIN — IPRATROPIUM BROMIDE AND ALBUTEROL SULFATE 3 ML: .5; 3 SOLUTION RESPIRATORY (INHALATION) at 11:02

## 2024-02-05 RX ADMIN — MUPIROCIN: 20 OINTMENT TOPICAL at 09:02

## 2024-02-05 RX ADMIN — SODIUM BICARBONATE 650 MG TABLET 1300 MG: at 09:02

## 2024-02-05 RX ADMIN — POTASSIUM CHLORIDE 40 MEQ: 1500 TABLET, EXTENDED RELEASE ORAL at 03:02

## 2024-02-05 RX ADMIN — CARVEDILOL 25 MG: 25 TABLET, FILM COATED ORAL at 06:02

## 2024-02-05 RX ADMIN — POTASSIUM CHLORIDE 40 MEQ: 1500 TABLET, EXTENDED RELEASE ORAL at 09:02

## 2024-02-05 RX ADMIN — MUPIROCIN: 20 OINTMENT TOPICAL at 08:02

## 2024-02-05 RX ADMIN — SODIUM BICARBONATE 650 MG TABLET 1300 MG: at 03:02

## 2024-02-05 NOTE — ASSESSMENT & PLAN NOTE
W/ Baseline CKD  Patient with progressively changing mental status, BUN/Cr worsening   Asterixis noted on exam.    Plan:  - Nephrology consulted, appreciate recommendations.  - Urine Studies suggesting intrinsic etiology (FeNa 3.9%)  - Strict I&O  - Avoid nephrotoxins and renally dose meds  - Plan for renal biopsy Tues 2/6  - IR consulted, appreciate recommendations.   - Anesthesia consulted for IJ line placement.  Placed 2/3, replaced 2/4.  - HD 2/4.

## 2024-02-05 NOTE — PROGRESS NOTES
AM Blood pressure 210/87, MD aware. Dose of carvedilol given without any effect. Per MD, patient needs Dialysis. Patient denies chest pain, headache and SOB.

## 2024-02-05 NOTE — PLAN OF CARE
Per am meeting - pt will need OP HD set up   1st HD session yesterday 2/04    CM met with pt - he was asleep - sister Emani at bedside    CM called and spoke with pt's wife Karmen - she is at work.    Per Ms. Peraza - ok to send HD referral for placement at [facility]   All pt info faxed to [facility]  -- P  [phone]; f    Pt states pt will either drive self to HD or take [employer].   Ms. Peraza states there is a new [employer] card for pt - she has it with her - CM asked her to bring card when she visits today -- CM will copy and ask admitting to upload.      CM rec'd a call from Kaye with [facility]  - REF # [id number] --- [facility] 2nd shift  -  [facility] needs one more lab value -- Hep B Surface Antibody  -- IP HD nurse Rui will order this lab now.     as of 1635 -- Hep B Surface Antibody lab - no results yet

## 2024-02-05 NOTE — ADDENDUM NOTE
Addendum  created 02/05/24 1058 by Chandra Luevano MD    Clinical Note Signed, SmartForm saved

## 2024-02-05 NOTE — SUBJECTIVE & OBJECTIVE
Interval History: No adverse events overnight. HD yesterday.    Review of Systems   Constitutional:  Negative for appetite change, chills and fever.   HENT:  Negative for rhinorrhea, sore throat and trouble swallowing.    Eyes:  Negative for pain and visual disturbance.   Respiratory:  Negative for cough and shortness of breath.    Cardiovascular:  Negative for chest pain, palpitations and leg swelling.   Gastrointestinal:  Negative for abdominal pain, constipation, diarrhea, nausea and vomiting.   Genitourinary:  Negative for dysuria and hematuria.   Musculoskeletal:  Negative for gait problem and neck stiffness.   Skin:  Negative for rash and wound.   Neurological:  Negative for tremors, weakness and headaches.   Psychiatric/Behavioral:  Negative for agitation and confusion.      Objective:     Vital Signs (Most Recent):  Temp: 98 °F (36.7 °C) (02/05/24 1054)  Pulse: (!) 54 (02/05/24 1215)  Resp: 18 (02/05/24 1215)  BP: (!) 163/79 (02/05/24 1054)  SpO2: 97 % (02/05/24 1215) Vital Signs (24h Range):  Temp:  [97.9 °F (36.6 °C)-98.5 °F (36.9 °C)] 98 °F (36.7 °C)  Pulse:  [53-76] 54  Resp:  [17-20] 18  SpO2:  [90 %-98 %] 97 %  BP: (147-210)/() 163/79     Weight: 75.1 kg (165 lb 9.1 oz)  Body mass index is 23.76 kg/m².    Intake/Output Summary (Last 24 hours) at 2/5/2024 1400  Last data filed at 2/5/2024 1156  Gross per 24 hour   Intake 500 ml   Output 3100 ml   Net -2600 ml         Physical Exam  Vitals and nursing note reviewed.   Constitutional:       General: He is not in acute distress.     Appearance: Normal appearance. He is normal weight. He is not ill-appearing or toxic-appearing.   HENT:      Head: Normocephalic.      Right Ear: External ear normal.      Left Ear: External ear normal.      Nose: Nose normal.      Mouth/Throat:      Pharynx: Oropharynx is clear.   Eyes:      Extraocular Movements: Extraocular movements intact.   Neck:      Comments: [facility]  Cardiovascular:      Rate and Rhythm: Normal rate  and regular rhythm.      Pulses: Normal pulses.   Pulmonary:      Effort: Pulmonary effort is normal. No respiratory distress.      Breath sounds: No wheezing, rhonchi or rales.   Abdominal:      General: Abdomen is flat. There is no distension.      Palpations: Abdomen is soft.      Tenderness: There is no abdominal tenderness. There is no guarding or rebound.   Musculoskeletal:         General: Normal range of motion.      Cervical back: Normal range of motion.      Right lower leg: No edema.      Left lower leg: No edema.   Skin:     General: Skin is warm.      Coloration: Skin is not jaundiced.   Neurological:      General: No focal deficit present.      Mental Status: He is alert. Mental status is at baseline.      Motor: No weakness.      Comments: No asterixis noted.  Oriented to person and place, not to time   Psychiatric:         Mood and Affect: Mood normal.         Behavior: Behavior normal.         Thought Content: Thought content normal.             Significant Labs: All pertinent labs within the past 24 hours have been reviewed.  CBC:   Recent Labs   Lab 02/04/24  0345 02/05/24  0327   WBC 3.49* 2.98*   HGB 8.7* 8.5*   HCT 24.3* 23.5*   PLT 73* 75*     CMP:   Recent Labs   Lab 02/04/24  0345 02/05/24  0327   * 139   K 2.8* 3.1*    105   CO2 17* 23   * 91   BUN 94* 53*   CREATININE 9.9* 6.6*   CALCIUM 7.9* 7.8*   PROT 5.5* 5.1*   ALBUMIN 2.1* 2.0*   BILITOT 0.5 0.5   ALKPHOS 86 81   * 98*   * 240*   ANIONGAP 15 11       Significant Imaging: I have reviewed all pertinent imaging results/findings within the past 24 hours.

## 2024-02-05 NOTE — ASSESSMENT & PLAN NOTE
IMPROVING    Plan:  Will hold PRN Tylenol  Abdominal ultrasound with no acute abnormalities  Hepatitis panel negative  Additional work-up labs pending.

## 2024-02-05 NOTE — ASSESSMENT & PLAN NOTE
Patient with documented diagnosis of Atrial Fibrillation per notes by Cardiologist Dmitriy Chavarria MD reports being compliant with the following home regimen: Apixaban 5mg BID, Amiodarone. EKG taken during admission reveals Aflutter/A Fib    Plan:  - [id number] Score: 1  - Will hold Amiodarone in setting of transaminitis.

## 2024-02-05 NOTE — PROGRESS NOTES
"[facility]  Progress Note    Patient Name: Domingo Gross  MRN: [id number]  Patient Class: IP- Inpatient   Admission Date: 1/27/2024  Length of Stay: 9 days  Attending Physician: Pawan Garcia,*  Primary Care Provider: Analy Encarnacion MD        Subjective:     Principal Problem:BRTET (acute kidney injury)        HPI:  63-year-old man with PMHx of HTN, Atrial Fibrillation (sees Dr. Calderón, on Amiodarone, Apixaban), COPD (inhaler PRN), CKD (sees Dr. Payne), Hypothyroidism (Levothyroxine) presents via ambulance to [facility] on 1/27/24 for 1-day history of chest pain, cough, irregular breathing. History was collected from patient and wife, who is at bedside.    Patient reports experiencing chest pain, cough, and decreased appetite that started last night. Reports a fever of 102, did not take Tylenol. This morning, wife noticed that he was breathing "funny" and called the ambulance. Patient's boss recently tested positive for Flu/COVID. Patient emphasized that lack of food intake was due to decreased appetite and denied emesis and nausea.    Currently patient denies any chest pain or difficulty breathing. He reports taking potassium at home & being compliant with all medications. He is a former smoker and denies drinking alcohol.    In the ER, vitals were as follows - Temp 99.8 -> 102.3, HR 68, /87, O2 94%. Labs were significant for K 2.4, Magnesium 1.3, BUN/Cr 21/3.2 (baseline 2.8), AST//302, Alk phos 51 WBC 6.15, Procal 0.28. Patient was given 40mEq of potassium PO, 10mEq of KCl and admitted to [facility] for management/further workup of of Hypokalemia, BRETT, PNA, Transaminitis.    Overview/Hospital Course:  No notes on file    Interval History: No adverse events overnight. HD yesterday.    Review of Systems   Constitutional:  Negative for appetite change, chills and fever.   HENT:  Negative for rhinorrhea, sore throat and trouble swallowing.    Eyes:  " Negative for pain and visual disturbance.   Respiratory:  Negative for cough and shortness of breath.    Cardiovascular:  Negative for chest pain, palpitations and leg swelling.   Gastrointestinal:  Negative for abdominal pain, constipation, diarrhea, nausea and vomiting.   Genitourinary:  Negative for dysuria and hematuria.   Musculoskeletal:  Negative for gait problem and neck stiffness.   Skin:  Negative for rash and wound.   Neurological:  Negative for tremors, weakness and headaches.   Psychiatric/Behavioral:  Negative for agitation and confusion.      Objective:     Vital Signs (Most Recent):  Temp: 98 °F (36.7 °C) (02/05/24 1054)  Pulse: (!) 54 (02/05/24 1215)  Resp: 18 (02/05/24 1215)  BP: (!) 163/79 (02/05/24 1054)  SpO2: 97 % (02/05/24 1215) Vital Signs (24h Range):  Temp:  [97.9 °F (36.6 °C)-98.5 °F (36.9 °C)] 98 °F (36.7 °C)  Pulse:  [53-76] 54  Resp:  [17-20] 18  SpO2:  [90 %-98 %] 97 %  BP: (147-210)/() 163/79     Weight: 75.1 kg (165 lb 9.1 oz)  Body mass index is 23.76 kg/m².    Intake/Output Summary (Last 24 hours) at 2/5/2024 1400  Last data filed at 2/5/2024 1156  Gross per 24 hour   Intake 500 ml   Output 3100 ml   Net -2600 ml         Physical Exam  Vitals and nursing note reviewed.   Constitutional:       General: He is not in acute distress.     Appearance: Normal appearance. He is normal weight. He is not ill-appearing or toxic-appearing.   HENT:      Head: Normocephalic.      Right Ear: External ear normal.      Left Ear: External ear normal.      Nose: Nose normal.      Mouth/Throat:      Pharynx: Oropharynx is clear.   Eyes:      Extraocular Movements: Extraocular movements intact.   Neck:      Comments: [facility]  Cardiovascular:      Rate and Rhythm: Normal rate and regular rhythm.      Pulses: Normal pulses.   Pulmonary:      Effort: Pulmonary effort is normal. No respiratory distress.      Breath sounds: No wheezing, rhonchi or rales.   Abdominal:      General: Abdomen is flat. There  is no distension.      Palpations: Abdomen is soft.      Tenderness: There is no abdominal tenderness. There is no guarding or rebound.   Musculoskeletal:         General: Normal range of motion.      Cervical back: Normal range of motion.      Right lower leg: No edema.      Left lower leg: No edema.   Skin:     General: Skin is warm.      Coloration: Skin is not jaundiced.   Neurological:      General: No focal deficit present.      Mental Status: He is alert. Mental status is at baseline.      Motor: No weakness.      Comments: No asterixis noted.  Oriented to person and place, not to time   Psychiatric:         Mood and Affect: Mood normal.         Behavior: Behavior normal.         Thought Content: Thought content normal.             Significant Labs: All pertinent labs within the past 24 hours have been reviewed.  CBC:   Recent Labs   Lab 02/04/24 0345 02/05/24  0327   WBC 3.49* 2.98*   HGB 8.7* 8.5*   HCT 24.3* 23.5*   PLT 73* 75*     CMP:   Recent Labs   Lab 02/04/24 0345 02/05/24  0327   * 139   K 2.8* 3.1*    105   CO2 17* 23   * 91   BUN 94* 53*   CREATININE 9.9* 6.6*   CALCIUM 7.9* 7.8*   PROT 5.5* 5.1*   ALBUMIN 2.1* 2.0*   BILITOT 0.5 0.5   ALKPHOS 86 81   * 98*   * 240*   ANIONGAP 15 11       Significant Imaging: I have reviewed all pertinent imaging results/findings within the past 24 hours.    Assessment/Plan:      * BRETT (acute kidney injury)  W/ Baseline CKD  Patient with progressively changing mental status, BUN/Cr worsening   Asterixis noted on exam.    Plan:  - Nephrology consulted, appreciate recommendations.  - Urine Studies suggesting intrinsic etiology (FeNa 3.9%)  - Strict I&O  - Avoid nephrotoxins and renally dose meds  - Plan for renal biopsy Tues 2/6  - IR consulted, appreciate recommendations.   - Anesthesia consulted for IJ line placement.  Placed 2/3, replaced 2/4.  - HD 2/4.    Uremia  Plan in BRETT    Hyponatremia  RESOLVED      CMV (cytomegalovirus  infection)  Plan:  - Quantitative CMV DNA negative.  - Not likely acute infection.    EBV infection  EBV IgM and DNA are negative  these serologies likely represent previous infection    Plan:  - ID consulted, appreciate recommendations.      CAP (community acquired pneumonia)  RESOLVED.    Acute liver failure without hepatic coma  IMPROVING    Plan:  Will hold PRN Tylenol  Abdominal ultrasound with no acute abnormalities  Hepatitis panel negative  Additional work-up labs pending.    Hypothyroidism  Patient with reported TSH 5 months ago 13.669    Plan:  Repeat TSH elevated, T4 normal  Increased home levothyroxine     Persistent atrial fibrillation  Patient with documented diagnosis of Atrial Fibrillation per notes by Cardiologist Dmitriy Chavarria MD reports being compliant with the following home regimen: Apixaban 5mg BID, Amiodarone. EKG taken during admission reveals Aflutter/A Fib    Plan:  - [id number] Score: 1  - Will hold Amiodarone in setting of transaminitis.    Hyperlipidemia  Patient with known history of HLD reports being compliant with the following home regimen: Rosuvastatin 40mg QD    Plan:   - Hold statin in setting of elevated transaminitis  - Continue home equivalent Atorvastatin 80 mg QD when appropriate    HTN (hypertension)  Patient with known history of HTN reports being compliant with the following home regimen: Carvedilol 25mg BID, Eplerenone 50mg QD, Imdur 60mg QD. Patient's pressures on admission to the ER are (143-198)/(64-88) - elevated likely secondary to not taking medications before arriving to the hospital.    Plan:  - Continue home Carvedilol and Imdur  - Eplerenone not available in hospital; in setting of his hypokalemia, will use Spironolactone if additional antihypertensives are required      VTE Risk Mitigation (From admission, onward)           Ordered     IP VTE HIGH RISK PATIENT  Once         01/27/24 1240     Place sequential compression device  Until discontinued          01/27/24 1240                    Discharge Planning   FLACO:      Code Status: Full Code   Is the patient medically ready for discharge?:     Reason for patient still in hospital (select all that apply): Patient trending condition, Treatment, Consult recommendations, and Pending disposition  Discharge Plan A: Home, Home with family   Discharge Delays: None known at this time      ________________________  Jude Sanford MD  [facility] PGY-2

## 2024-02-05 NOTE — PROGRESS NOTES
Nephrology Progress  Note     Consult Requested By: Pawan Garcia,*  Reason for Consult: BRETT on CKD4     SUBJECTIVE:        ?    Review of Systems   Constitutional:  Negative for chills and fever.   HENT:  Negative for congestion and sore throat.    Eyes:  Negative for blurred vision, double vision and photophobia.   Respiratory:  Negative for cough and shortness of breath.    Cardiovascular:  Negative for chest pain, palpitations and leg swelling.   Gastrointestinal:  Negative for abdominal pain, diarrhea, nausea and vomiting.   Genitourinary:  Negative for dysuria and urgency.   Musculoskeletal:  Negative for joint pain and myalgias.   Skin:  Negative for itching and rash.   Neurological:  Positive for weakness. Negative for dizziness, sensory change and headaches.   Endo/Heme/Allergies:  Negative for polydipsia. Does not bruise/bleed easily.   Psychiatric/Behavioral:  Negative for depression.        Past Medical History:   Diagnosis Date    Allergy     Anemia     Anticoagulant long-term use     Arthritis     CKD (chronic kidney disease) stage 4, GFR 15-29 ml/min     COPD (chronic obstructive pulmonary disease) 08/20/2021    Coronary artery disease     Heart attack 10/04/2019    Hematuria     Hemothorax     Hyperlipidemia     Hypertension     Hyperuricemia     Hypocalcemia     Hypokalemia     Hypophosphatemia     Hypothyroidism 3/2/2023    PAD (peripheral artery disease)     Proteinuria     Vitamin D deficiency           OBJECTIVE:     Vital Signs (Most Recent)  Vitals:    02/05/24 0600 02/05/24 0624 02/05/24 0719 02/05/24 0736   BP: (!) 188/81 (!) 210/87  (!) 190/85   BP Location:    Right arm   Patient Position:    Lying   Pulse:  (!) 58 (!) 53 (!) 56   Resp:   20 17   Temp:    98.4 °F (36.9 °C)   TempSrc:    Oral   SpO2:  (!) 94% 95% 97%   Weight:       Height:                       Medications:   sodium chloride 0.9%   Intravenous Once    albuterol-ipratropium  3 mL Nebulization Q4H    carvediloL  25  mg Oral BID WM    isosorbide mononitrate  60 mg Oral Daily    levothyroxine  75 mcg Oral Before breakfast    mupirocin   Nasal BID    potassium chloride  40 mEq Oral Q4H    potassium chloride  40 mEq Oral Once    sodium bicarbonate  1,300 mg Oral TID           Physical Exam  Vitals and nursing note reviewed.   Constitutional:       General: He is not in acute distress.     Appearance: He is not diaphoretic.   HENT:      Head: Normocephalic and atraumatic.      Mouth/Throat:      Pharynx: No oropharyngeal exudate.   Eyes:      General: No scleral icterus.     Conjunctiva/sclera: Conjunctivae normal.      Pupils: Pupils are equal, round, and reactive to light.   Cardiovascular:      Rate and Rhythm: Normal rate and regular rhythm.      Heart sounds: Normal heart sounds. No murmur heard.  Pulmonary:      Effort: Pulmonary effort is normal. No respiratory distress.      Breath sounds: No rales.   Abdominal:      General: Bowel sounds are normal. There is no distension.      Palpations: Abdomen is soft.      Tenderness: There is no abdominal tenderness.   Musculoskeletal:         General: Normal range of motion.      Cervical back: Normal range of motion and neck supple.      Right lower leg: No edema.      Left lower leg: No edema.      Comments: [facility] CVC    Skin:     General: Skin is warm and dry.      Findings: No erythema.   Neurological:      Mental Status: He is alert and oriented to person, place, and time.      Cranial Nerves: No cranial nerve deficit.      Comments:     Psychiatric:         Mood and Affect: Affect normal.         Cognition and Memory: Memory normal.         Judgment: Judgment normal.         Laboratory:  Recent Labs   Lab 02/03/24 0257 02/04/24 0345 02/05/24  0327   WBC 2.90* 3.49* 2.98*   HGB 8.9* 8.7* 8.5*   HCT 24.5* 24.3* 23.5*   PLT 64* 73* 75*   MONO 12.4  0.4 9.5  0.3 15.8*  0.5   EOSINOPHIL 4.8 5.2 4.4       Recent Labs   Lab 02/03/24 0257 02/04/24 0345 02/05/24  0327   *  133* 139   K 3.3* 2.8* 3.1*    101 105   CO2 11* 17* 23   BUN 95* 94* 53*   CREATININE 9.4* 9.9* 6.6*   CALCIUM 7.7* 7.9* 7.8*   PHOS 8.0* 7.4* 4.5         Diagnostic Results:  X-Ray: Reviewed  US: Reviewed  Echo: Reviewed  ASSESSMENT/PLAN:     BRETT on CKD4 - Cr baseline 2.8 Proteinuria    -- Prior work up noncontributory  Only TOOTIE + non specific   -- Following with Dr Loly Payne in clinic for his CKD4  Cr trending up not clear etiology needs Kidney biopsy was on Plavix now off - presumed Kidney Biopsy Monday by IR appreciate assistance.   --   LFTs improving  but Cr trending  up since admission 3.2=>3.5=>3.9=>4.6 =>6.4 => 7.6=> 8.1=>9.4 =>9.9   DDx ATN, AIN, GN   Decreases Plts - no schistocytes ?  Acute illness,   LFTs better,  WBCs trending down    hold off on Steroids   -- reorder extensive Serological and immunological work up just incase first one was false negative. - so far non contributory   -- Had [employer] Trialysis  line  2/4 t Dialysis  2hr in anticipation of Kidney biopsy 2/6  to avoid plt disorder from uremia  Good UOP  2.4L K+ low ? Recovery of ATN   No indication for HD today      Hypokalemia   -- replace PO     Hyponatremia 128   -- Osm with in range 289   -- concerning for other protein/Urea contribution to S OSm        2. HTN   controlled   3. Anemia of chronic kidney disease    Recent Labs   Lab 02/03/24 0257 02/04/24 0345 02/05/24 0327   HGB 8.9* 8.7* 8.5*   HCT 24.5* 24.3* 23.5*   PLT 64* 73* 75*         Iron   Lab Results   Component Value Date    IRON 60 03/14/2023    TIBC 299 03/14/2023    FERRITIN 191 03/14/2023       4. MBD (E88.9 M90.80) - PTH at goal   Recent Labs   Lab 02/05/24 0327   CALCIUM 7.8*   PHOS 4.5       Recent Labs   Lab 02/03/24  0257 02/04/24 0345 02/05/24  0327   MG 2.2 2.3 1.9         Lab Results   Component Value Date    PTH 60.6 08/11/2021    CALCIUM 7.8 (L) 02/05/2024    CAION 1.31 07/14/2020    PHOS 4.5 02/05/2024     Lab Results   Component Value Date     [id number] 42 08/11/2021       Lab Results   Component Value Date    CO2 23 02/05/2024       5. A-fib -  per primary   6. Nutrition/Hypoalbuminemia    Recent Labs   Lab 01/30/24  1131 01/31/24  0318 02/01/24  0126 02/02/24  0401 02/04/24  0345 02/05/24  0327   LABPROT 14.0*  --  11.4  --   --   --    ALBUMIN  --    < > 1.9*   < > 2.1* 2.0*    < > = values in this interval not displayed.       Nepro with meals TID.       Thank you for the consult, will follow  With any question please call [phone]  Nena Arriola MD    [facility]    ESPERANZA Payne MD,   MD BRANDY Arredondo MD E. V. Harmon, NP    [address]  [place], [address]  [phone]

## 2024-02-06 LAB
ALBUMIN SERPL BCP-MCNC: 2.1 G/DL (ref 3.5–5.2)
ALP SERPL-CCNC: 75 U/L (ref 55–135)
ALT SERPL W/O P-5'-P-CCNC: 198 U/L (ref 10–44)
ANION GAP SERPL CALC-SCNC: 7 MMOL/L (ref 8–16)
AST SERPL-CCNC: 77 U/L (ref 10–40)
BASOPHILS # BLD AUTO: 0.01 K/UL (ref 0–0.2)
BASOPHILS NFR BLD: 0.2 % (ref 0–1.9)
BILIRUB SERPL-MCNC: 0.6 MG/DL (ref 0.1–1)
BUN SERPL-MCNC: 55 MG/DL (ref 8–23)
CALCIUM SERPL-MCNC: 7.9 MG/DL (ref 8.7–10.5)
CHLORIDE SERPL-SCNC: 107 MMOL/L (ref 95–110)
CO2 SERPL-SCNC: 24 MMOL/L (ref 23–29)
CREAT SERPL-MCNC: 6.8 MG/DL (ref 0.5–1.4)
DIFFERENTIAL METHOD BLD: ABNORMAL
EOSINOPHIL # BLD AUTO: 0.2 K/UL (ref 0–0.5)
EOSINOPHIL NFR BLD: 4.1 % (ref 0–8)
ERYTHROCYTE [DISTWIDTH] IN BLOOD BY AUTOMATED COUNT: 12.9 % (ref 11.5–14.5)
EST. GFR  (NO RACE VARIABLE): 9 ML/MIN/1.73 M^2
GLUCOSE SERPL-MCNC: 96 MG/DL (ref 70–110)
HCT VFR BLD AUTO: 24.7 % (ref 40–54)
HGB BLD-MCNC: 8.5 G/DL (ref 14–18)
IMM GRANULOCYTES # BLD AUTO: 0.09 K/UL (ref 0–0.04)
IMM GRANULOCYTES NFR BLD AUTO: 2.1 % (ref 0–0.5)
LYMPHOCYTES # BLD AUTO: 0.8 K/UL (ref 1–4.8)
LYMPHOCYTES NFR BLD: 18.2 % (ref 18–48)
MAGNESIUM SERPL-MCNC: 1.8 MG/DL (ref 1.6–2.6)
MCH RBC QN AUTO: 28.6 PG (ref 27–31)
MCHC RBC AUTO-ENTMCNC: 34.4 G/DL (ref 32–36)
MCV RBC AUTO: 83 FL (ref 82–98)
MONOCYTES # BLD AUTO: 0.5 K/UL (ref 0.3–1)
MONOCYTES NFR BLD: 11.8 % (ref 4–15)
NEUTROPHILS # BLD AUTO: 2.8 K/UL (ref 1.8–7.7)
NEUTROPHILS NFR BLD: 63.6 % (ref 38–73)
NRBC BLD-RTO: 0 /100 WBC
PHOSPHATE SERPL-MCNC: 4.2 MG/DL (ref 2.7–4.5)
PLATELET # BLD AUTO: 101 K/UL (ref 150–450)
PMV BLD AUTO: 11.5 FL (ref 9.2–12.9)
POTASSIUM SERPL-SCNC: 3.6 MMOL/L (ref 3.5–5.1)
PROT SERPL-MCNC: 5.4 G/DL (ref 6–8.4)
RBC # BLD AUTO: 2.97 M/UL (ref 4.6–6.2)
SODIUM SERPL-SCNC: 138 MMOL/L (ref 136–145)
WBC # BLD AUTO: 4.39 K/UL (ref 3.9–12.7)

## 2024-02-06 PROCEDURE — 99900035 HC TECH TIME PER 15 MIN (STAT)

## 2024-02-06 PROCEDURE — 25000242 PHARM REV CODE 250 ALT 637 W/ HCPCS

## 2024-02-06 PROCEDURE — 63600175 PHARM REV CODE 636 W HCPCS: Performed by: RADIOLOGY

## 2024-02-06 PROCEDURE — 80053 COMPREHEN METABOLIC PANEL: CPT

## 2024-02-06 PROCEDURE — 36415 COLL VENOUS BLD VENIPUNCTURE: CPT

## 2024-02-06 PROCEDURE — 25000003 PHARM REV CODE 250

## 2024-02-06 PROCEDURE — 83735 ASSAY OF MAGNESIUM: CPT

## 2024-02-06 PROCEDURE — 84100 ASSAY OF PHOSPHORUS: CPT

## 2024-02-06 PROCEDURE — 85025 COMPLETE CBC W/AUTO DIFF WBC: CPT

## 2024-02-06 PROCEDURE — 25000003 PHARM REV CODE 250: Performed by: STUDENT IN AN ORGANIZED HEALTH CARE EDUCATION/TRAINING PROGRAM

## 2024-02-06 PROCEDURE — 94761 N-INVAS EAR/PLS OXIMETRY MLT: CPT

## 2024-02-06 PROCEDURE — 94640 AIRWAY INHALATION TREATMENT: CPT

## 2024-02-06 PROCEDURE — 11000001 HC ACUTE MED/SURG PRIVATE ROOM

## 2024-02-06 RX ORDER — HYDRALAZINE HYDROCHLORIDE 20 MG/ML
10 INJECTION INTRAMUSCULAR; INTRAVENOUS ONCE
Status: COMPLETED | OUTPATIENT
Start: 2024-02-06 | End: 2024-02-06

## 2024-02-06 RX ORDER — LABETALOL HYDROCHLORIDE 5 MG/ML
5 INJECTION, SOLUTION INTRAVENOUS ONCE
Status: DISCONTINUED | OUTPATIENT
Start: 2024-02-06 | End: 2024-02-07

## 2024-02-06 RX ADMIN — ISOSORBIDE MONONITRATE 60 MG: 30 TABLET, EXTENDED RELEASE ORAL at 08:02

## 2024-02-06 RX ADMIN — CARVEDILOL 25 MG: 25 TABLET, FILM COATED ORAL at 08:02

## 2024-02-06 RX ADMIN — IPRATROPIUM BROMIDE AND ALBUTEROL SULFATE 3 ML: .5; 3 SOLUTION RESPIRATORY (INHALATION) at 11:02

## 2024-02-06 RX ADMIN — IPRATROPIUM BROMIDE AND ALBUTEROL SULFATE 3 ML: .5; 3 SOLUTION RESPIRATORY (INHALATION) at 04:02

## 2024-02-06 RX ADMIN — HYDRALAZINE HYDROCHLORIDE 10 MG: 20 INJECTION, SOLUTION INTRAMUSCULAR; INTRAVENOUS at 11:02

## 2024-02-06 RX ADMIN — Medication 9 MG: at 09:02

## 2024-02-06 RX ADMIN — IPRATROPIUM BROMIDE AND ALBUTEROL SULFATE 3 ML: .5; 3 SOLUTION RESPIRATORY (INHALATION) at 07:02

## 2024-02-06 RX ADMIN — SODIUM BICARBONATE 650 MG TABLET 1300 MG: at 03:02

## 2024-02-06 RX ADMIN — SODIUM BICARBONATE 650 MG TABLET 1300 MG: at 08:02

## 2024-02-06 RX ADMIN — MUPIROCIN: 20 OINTMENT TOPICAL at 09:02

## 2024-02-06 RX ADMIN — LEVOTHYROXINE SODIUM 75 MCG: 75 TABLET ORAL at 06:02

## 2024-02-06 RX ADMIN — SODIUM BICARBONATE 650 MG TABLET 1300 MG: at 09:02

## 2024-02-06 RX ADMIN — CARVEDILOL 25 MG: 25 TABLET, FILM COATED ORAL at 05:02

## 2024-02-06 RX ADMIN — IPRATROPIUM BROMIDE AND ALBUTEROL SULFATE 3 ML: .5; 3 SOLUTION RESPIRATORY (INHALATION) at 08:02

## 2024-02-06 RX ADMIN — IPRATROPIUM BROMIDE AND ALBUTEROL SULFATE 3 ML: .5; 3 SOLUTION RESPIRATORY (INHALATION) at 03:02

## 2024-02-06 RX ADMIN — MUPIROCIN: 20 OINTMENT TOPICAL at 08:02

## 2024-02-06 NOTE — PLAN OF CARE
Patient on RA in no apparent distress, given aerosol treatment, no adverse reactions will continue to monitor.

## 2024-02-06 NOTE — PROGRESS NOTES
"[facility]  Adult Nutrition  Progress Note    SUMMARY       Recommendations    Recommendation:  1.When medically acceptable, add cardiac restrictions to current renal diet  2. Encourage intake at meals  3. Monitor weight/labs  4. RD to follow to monitor PO intake    Goals:  Pt will consume 50-75% of meals by RD follow up  Nutrition Goal Status: new  Communication of RD Recs: other (comment) (POC)    Assessment and Plan    Nutrition Problem  Altered nutrition-related lab values    Related to (etiology):   BRETT; CKD Stage 4    Signs and Symptoms (as evidenced by):   BUN 55 H  Creatinine 6.9 H  eGFR 9 L  Albumin 2.1 L    Interventions:  Collaboration with other providers    Nutrition Diagnosis Status:   New    Malnutrition Assessment  Weight Loss (Malnutrition): 10% in 6 months     Reason for Assessment  Reason For Assessment: length of stay  Diagnosis: renal disease (BRETT)  Relevant Medical History: anticoagulants long-term use; arthritis; CKD stage 4; COPD; CAD; hypertension; hypothyroidism; anemia; heart attack; hyperlipidemia; hyperuricemia; hypocalcemia; hypokalemia; PAD; atherectomy; artography w/ serialography; cardiac catheterization; coronary angiography; left heart cathaterization; stent; PTCA  General Information Comments: Pt on renal diet with Novas30 [employer] Renal TID. Pt on HD as needed. Brian 20-skin intact. Unable to assess NFPE at this time, pt asleep. Noted 20 lb wt loss x 6 months  Nutrition Discharge Planning: d/c to be determined    Nutrition Risk Screen  Nutrition Risk Screen: no indicators present    Nutrition/Diet History  Food Preferences: no cultural or Christianity preferences identified  Spiritual, Cultural Beliefs, Denominational Practices, Values that Affect Care: no  Factors Affecting Nutritional Intake: None identified at this time    Anthropometrics  Temp: 97.5 °F (36.4 °C)  Height Method: Stated  Height: 5' 10" (177.8 cm)  Height (inches): 70 in  Weight Method: Stated  Weight: 75.1 kg (165 " lb 9.1 oz)  Weight (lb): 165.57 lb  Ideal Body Weight (IBW), Male: 166 lb  % Ideal Body Weight, Male (lb): 99.74 %  BMI (Calculated): 23.8  BMI Grade: 18.5-24.9 - normal  Usual Body Weight (UBW), k.9 kg  % Usual Body Weight: 89.7  % Weight Change From Usual Weight: -10.49 %     Lab/Procedures/Meds  Pertinent Labs Reviewed: reviewed  Pertinent Labs Comments: RBC 2.97L; Hemoglobin 8.5 L; Hematocrit 24.7 l; Platelet count 101L; Anion gap 7L; BUN 55H; creatinine 6.8H; eGFR 9L; calcium 7.9L; Albumin 2.1L; AST 77H; ALT 198H; CRP 81.3H  Pertinent Medications Reviewed: reviewed  Pertinent Medications Comments: sherin-neb; carvedilol; isosorbide mononitrate; levothyroxine; mupirocin 2%    Estimated/Assessed Needs  Weight Used For Calorie Calculations: 75.3 kg (166 lb)  Energy Calorie Requirements (kcal): 2258 kcal (30 kcal/kg IBW)  Energy Need Method: Kcal/kg  Protein Requirements: 75-90 g (1.0-1.2 g/kg IBW)  Weight Used For Protein Calculations: 75.3 kg (166 lb)  Estimated Fluid Requirement Method: RDA Method  RDA Method (mL): 2258    Nutrition Prescription Ordered  Current Diet Order: renal  Oral Nutrition Supplement: Novasource renal    Evaluation of Received Nutrient/Fluid Intake  I/O: 0/300  Energy Calories Required: not meeting needs  Protein Required: not meeting needs  Fluid Required: not meeting needs  Comments: LBM: /  % Intake of Estimated Energy Needs: 75 - 100 %  % Meal Intake: 75 - 100 %    Nutrition Risk  Level of Risk/Frequency of Follow-up:  (2x weekly)     Monitor and Evaluation  Food and Nutrient Intake: energy intake  Food and Nutrient Adminstration: diet order  Physical Activity and Function: nutrition-related ADLs and IADLs  Anthropometric Measurements: weight  Biochemical Data, Medical Tests and Procedures: electrolyte and renal panel, lipid profile, gastrointestinal profile  Nutrition-Focused Physical Findings: overall appearance     Nutrition Follow-Up  RD Follow-up?: Yes

## 2024-02-06 NOTE — SUBJECTIVE & OBJECTIVE
Interval History: No adverse events overnight.    Review of Systems   Constitutional:  Negative for appetite change, chills and fever.   HENT:  Negative for rhinorrhea, sore throat and trouble swallowing.    Eyes:  Negative for pain and visual disturbance.   Respiratory:  Negative for cough and shortness of breath.    Cardiovascular:  Negative for chest pain, palpitations and leg swelling.   Gastrointestinal:  Negative for abdominal pain, constipation, diarrhea, nausea and vomiting.   Genitourinary:  Negative for dysuria and hematuria.   Musculoskeletal:  Negative for gait problem and neck stiffness.   Skin:  Negative for rash and wound.   Neurological:  Negative for tremors, weakness and headaches.   Psychiatric/Behavioral:  Negative for agitation and confusion.      Objective:     Vital Signs (Most Recent):  Temp: 98.7 °F (37.1 °C) (02/06/24 1109)  Pulse: (!) 56 (02/06/24 1135)  Resp: 12 (02/06/24 1135)  BP: (!) 186/83 (02/06/24 1132)  SpO2: 95 % (02/06/24 1135) Vital Signs (24h Range):  Temp:  [97.5 °F (36.4 °C)-98.7 °F (37.1 °C)] 98.7 °F (37.1 °C)  Pulse:  [53-63] 56  Resp:  [12-20] 12  SpO2:  [93 %-100 %] 95 %  BP: (186-221)/() 186/83     Weight: 83.9 kg (185 lb)  Body mass index is 26.54 kg/m².    Intake/Output Summary (Last 24 hours) at 2/6/2024 1144  Last data filed at 2/6/2024 0711  Gross per 24 hour   Intake --   Output 1575 ml   Net -1575 ml         Physical Exam  Vitals and nursing note reviewed.   Constitutional:       General: He is not in acute distress.     Appearance: Normal appearance. He is normal weight. He is not ill-appearing or toxic-appearing.   HENT:      Head: Normocephalic.      Right Ear: External ear normal.      Left Ear: External ear normal.      Nose: Nose normal.      Mouth/Throat:      Pharynx: Oropharynx is clear.   Eyes:      Extraocular Movements: Extraocular movements intact.   Neck:      Comments: [facility]  Cardiovascular:      Rate and Rhythm: Normal rate and regular rhythm.       Pulses: Normal pulses.   Pulmonary:      Effort: Pulmonary effort is normal. No respiratory distress.      Breath sounds: No wheezing, rhonchi or rales.   Abdominal:      General: Abdomen is flat. There is no distension.      Palpations: Abdomen is soft.      Tenderness: There is no abdominal tenderness. There is no guarding or rebound.   Musculoskeletal:         General: Normal range of motion.      Cervical back: Normal range of motion.      Right lower leg: No edema.      Left lower leg: No edema.   Skin:     General: Skin is warm.      Coloration: Skin is not jaundiced.   Neurological:      General: No focal deficit present.      Mental Status: He is alert and oriented to person, place, and time. Mental status is at baseline.      Motor: No weakness.   Psychiatric:         Mood and Affect: Mood normal.         Behavior: Behavior normal.         Thought Content: Thought content normal.             Significant Labs: All pertinent labs within the past 24 hours have been reviewed.  CBC:   Recent Labs   Lab 02/05/24 0327 02/06/24  0420   WBC 2.98* 4.39   HGB 8.5* 8.5*   HCT 23.5* 24.7*   PLT 75* 101*     CMP:   Recent Labs   Lab 02/05/24  0327 02/06/24  0420    138   K 3.1* 3.6    107   CO2 23 24   GLU 91 96   BUN 53* 55*   CREATININE 6.6* 6.8*   CALCIUM 7.8* 7.9*   PROT 5.1* 5.4*   ALBUMIN 2.0* 2.1*   BILITOT 0.5 0.6   ALKPHOS 81 75   AST 98* 77*   * 198*   ANIONGAP 11 7*       Significant Imaging: I have reviewed all pertinent imaging results/findings within the past 24 hours.

## 2024-02-06 NOTE — PLAN OF CARE
Recommendations:  1.When medically acceptable, add cardiac restrictions to current renal diet  2. Encourage intake at meals  3. Monitor weight/labs  4. RD to follow to monitor PO intake     Goals:  Pt will consume 50-75% of meals by RD follow up

## 2024-02-06 NOTE — ASSESSMENT & PLAN NOTE
Patient with documented diagnosis of Atrial Fibrillation per notes by Cardiologist Dmitriy Chavarria MD reports being compliant with the following home regimen: Apixaban 5mg BID, Amiodarone. EKG taken during admission reveals Aflutter/A Fib    Plan:  - [id number] Score: 1  - Will hold Amiodarone in setting of transaminitis.

## 2024-02-06 NOTE — NURSING
Patient /79 after a total of 20mg of hydralazine given. Patient denies pain and is AOx4 unlabored on RA. Report given to Caitlin VALENZUELA. Patient transported back to floor.

## 2024-02-06 NOTE — PROGRESS NOTES
Patient brought to IR pre-procedure area for planned image guided renal biopsy with SBP ~200s. Two doses of hydralazine given with improvement in BP, but persistently elevated above . Therefore, renal biopsy will be deferred until BP is under better sustained control. When SBP is <160 sustained, please place IR consult and we can proceed with biopsy.     Ben Babcock MD

## 2024-02-06 NOTE — PROGRESS NOTES
"[facility] Medicine  Progress Note    Patient Name: Domingo Gross  MRN: [id number]  Patient Class: IP- Inpatient   Admission Date: 1/27/2024  Length of Stay: 10 days  Attending Physician: Khushboo Chapa MD  Primary Care Provider: Analy Encarnacion MD        Subjective:     Principal Problem:BRETT (acute kidney injury)        HPI:  63-year-old man with PMHx of HTN, Atrial Fibrillation (sees Dr. Calderón, on Amiodarone, Apixaban), COPD (inhaler PRN), CKD (sees Dr. Payne), Hypothyroidism (Levothyroxine) presents via ambulance to [facility] on 1/27/24 for 1-day history of chest pain, cough, irregular breathing. History was collected from patient and wife, who is at bedside.    Patient reports experiencing chest pain, cough, and decreased appetite that started last night. Reports a fever of 102, did not take Tylenol. This morning, wife noticed that he was breathing "funny" and called the ambulance. Patient's boss recently tested positive for Flu/COVID. Patient emphasized that lack of food intake was due to decreased appetite and denied emesis and nausea.    Currently patient denies any chest pain or difficulty breathing. He reports taking potassium at home & being compliant with all medications. He is a former smoker and denies drinking alcohol.    In the ER, vitals were as follows - Temp 99.8 -> 102.3, HR 68, /87, O2 94%. Labs were significant for K 2.4, Magnesium 1.3, BUN/Cr 21/3.2 (baseline 2.8), AST//302, Alk phos 51 WBC 6.15, Procal 0.28. Patient was given 40mEq of potassium PO, 10mEq of KCl and admitted to [facility] for management/further workup of of Hypokalemia, BRETT, PNA, Transaminitis.    Overview/Hospital Course:  No notes on file    Interval History: No adverse events overnight.    Review of Systems   Constitutional:  Negative for appetite change, chills and fever.   HENT:  Negative for rhinorrhea, sore throat and trouble swallowing.    Eyes:  Negative for pain " and visual disturbance.   Respiratory:  Negative for cough and shortness of breath.    Cardiovascular:  Negative for chest pain, palpitations and leg swelling.   Gastrointestinal:  Negative for abdominal pain, constipation, diarrhea, nausea and vomiting.   Genitourinary:  Negative for dysuria and hematuria.   Musculoskeletal:  Negative for gait problem and neck stiffness.   Skin:  Negative for rash and wound.   Neurological:  Negative for tremors, weakness and headaches.   Psychiatric/Behavioral:  Negative for agitation and confusion.      Objective:     Vital Signs (Most Recent):  Temp: 98.7 °F (37.1 °C) (02/06/24 1109)  Pulse: (!) 56 (02/06/24 1135)  Resp: 12 (02/06/24 1135)  BP: (!) 186/83 (02/06/24 1132)  SpO2: 95 % (02/06/24 1135) Vital Signs (24h Range):  Temp:  [97.5 °F (36.4 °C)-98.7 °F (37.1 °C)] 98.7 °F (37.1 °C)  Pulse:  [53-63] 56  Resp:  [12-20] 12  SpO2:  [93 %-100 %] 95 %  BP: (186-221)/() 186/83     Weight: 83.9 kg (185 lb)  Body mass index is 26.54 kg/m².    Intake/Output Summary (Last 24 hours) at 2/6/2024 1144  Last data filed at 2/6/2024 0711  Gross per 24 hour   Intake --   Output 1575 ml   Net -1575 ml         Physical Exam  Vitals and nursing note reviewed.   Constitutional:       General: He is not in acute distress.     Appearance: Normal appearance. He is normal weight. He is not ill-appearing or toxic-appearing.   HENT:      Head: Normocephalic.      Right Ear: External ear normal.      Left Ear: External ear normal.      Nose: Nose normal.      Mouth/Throat:      Pharynx: Oropharynx is clear.   Eyes:      Extraocular Movements: Extraocular movements intact.   Neck:      Comments: [facility]  Cardiovascular:      Rate and Rhythm: Normal rate and regular rhythm.      Pulses: Normal pulses.   Pulmonary:      Effort: Pulmonary effort is normal. No respiratory distress.      Breath sounds: No wheezing, rhonchi or rales.   Abdominal:      General: Abdomen is flat. There is no distension.       Palpations: Abdomen is soft.      Tenderness: There is no abdominal tenderness. There is no guarding or rebound.   Musculoskeletal:         General: Normal range of motion.      Cervical back: Normal range of motion.      Right lower leg: No edema.      Left lower leg: No edema.   Skin:     General: Skin is warm.      Coloration: Skin is not jaundiced.   Neurological:      General: No focal deficit present.      Mental Status: He is alert and oriented to person, place, and time. Mental status is at baseline.      Motor: No weakness.   Psychiatric:         Mood and Affect: Mood normal.         Behavior: Behavior normal.         Thought Content: Thought content normal.             Significant Labs: All pertinent labs within the past 24 hours have been reviewed.  CBC:   Recent Labs   Lab 02/05/24 0327 02/06/24  0420   WBC 2.98* 4.39   HGB 8.5* 8.5*   HCT 23.5* 24.7*   PLT 75* 101*     CMP:   Recent Labs   Lab 02/05/24 0327 02/06/24  0420    138   K 3.1* 3.6    107   CO2 23 24   GLU 91 96   BUN 53* 55*   CREATININE 6.6* 6.8*   CALCIUM 7.8* 7.9*   PROT 5.1* 5.4*   ALBUMIN 2.0* 2.1*   BILITOT 0.5 0.6   ALKPHOS 81 75   AST 98* 77*   * 198*   ANIONGAP 11 7*       Significant Imaging: I have reviewed all pertinent imaging results/findings within the past 24 hours.    Assessment/Plan:      * BRETT (acute kidney injury)  W/ Baseline CKD  Patient with progressively changing mental status, BUN/Cr worsening   Asterixis noted on exam.    Plan:  - Nephrology consulted, appreciate recommendations.  - Urine Studies suggesting intrinsic etiology (FeNa 3.9%)  - Strict I&O  - Avoid nephrotoxins and renally dose meds  - Plan for renal biopsy Tues 2/6  - IR consulted, appreciate recommendations.   - Anesthesia consulted for IJ line placement.  Placed 2/3, replaced 2/4.  - HD 2/4.    Uremia  Plan in BRETT    Hyponatremia  RESOLVED      CMV (cytomegalovirus infection)  Plan:  - Quantitative CMV DNA negative.  - Not likely  acute infection.    EBV infection  EBV IgM and DNA are negative  these serologies likely represent previous infection    Plan:  - ID consulted, appreciate recommendations.      CAP (community acquired pneumonia)  RESOLVED.    Acute liver failure without hepatic coma  IMPROVING    Plan:  Will hold PRN Tylenol  Abdominal ultrasound with no acute abnormalities  Hepatitis panel negative  Additional work-up labs pending.    Hypothyroidism  Patient with reported TSH 5 months ago 13.669    Plan:  Repeat TSH elevated, T4 normal  Increased home levothyroxine     Persistent atrial fibrillation  Patient with documented diagnosis of Atrial Fibrillation per notes by Cardiologist Dmitriy Chavarria MD reports being compliant with the following home regimen: Apixaban 5mg BID, Amiodarone. EKG taken during admission reveals Aflutter/A Fib    Plan:  - [id number] Score: 1  - Will hold Amiodarone in setting of transaminitis.    Hyperlipidemia  Patient with known history of HLD reports being compliant with the following home regimen: Rosuvastatin 40mg QD    Plan:   - Hold statin in setting of elevated transaminitis  - Continue home equivalent Atorvastatin 80 mg QD when appropriate    HTN (hypertension)  Patient with known history of HTN reports being compliant with the following home regimen: Carvedilol 25mg BID, Eplerenone 50mg QD, Imdur 60mg QD. Patient's pressures on admission to the ER are (143-198)/(64-88) - elevated likely secondary to not taking medications before arriving to the hospital.    Plan:  - Continue home Carvedilol and Imdur  - Eplerenone not available in hospital; in setting of his hypokalemia, will use Spironolactone if additional antihypertensives are required      VTE Risk Mitigation (From admission, onward)           Ordered     IP VTE HIGH RISK PATIENT  Once         01/27/24 1240     Place sequential compression device  Until discontinued         01/27/24 1240                    Discharge Planning   FLACO:      Code  Status: Full Code   Is the patient medically ready for discharge?:     Reason for patient still in hospital (select all that apply): Patient trending condition, Laboratory test, Treatment, Consult recommendations, and Pending disposition  Discharge Plan A: Home, Home with family   Discharge Delays: None known at this time    ________________________  Jude Sanford MD  [facility] PGY-2

## 2024-02-06 NOTE — PROGRESS NOTES
Nephrology Progress  Note     Consult Requested By: Khushboo Chapa MD  Reason for Consult: BRETT on CKD4     SUBJECTIVE:        ?    Review of Systems   Constitutional:  Negative for chills and fever.   HENT:  Negative for congestion and sore throat.    Eyes:  Negative for blurred vision, double vision and photophobia.   Respiratory:  Negative for cough and shortness of breath.    Cardiovascular:  Negative for chest pain, palpitations and leg swelling.   Gastrointestinal:  Negative for abdominal pain, diarrhea, nausea and vomiting.   Genitourinary:  Negative for dysuria and urgency.   Musculoskeletal:  Negative for joint pain and myalgias.   Skin:  Negative for itching and rash.   Neurological:  Positive for weakness. Negative for dizziness, sensory change and headaches.   Endo/Heme/Allergies:  Negative for polydipsia. Does not bruise/bleed easily.   Psychiatric/Behavioral:  Negative for depression.        Past Medical History:   Diagnosis Date    Allergy     Anemia     Anticoagulant long-term use     Arthritis     CKD (chronic kidney disease) stage 4, GFR 15-29 ml/min     COPD (chronic obstructive pulmonary disease) 08/20/2021    Coronary artery disease     Heart attack 10/04/2019    Hematuria     Hemothorax     Hyperlipidemia     Hypertension     Hyperuricemia     Hypocalcemia     Hypokalemia     Hypophosphatemia     Hypothyroidism 3/2/2023    PAD (peripheral artery disease)     Proteinuria     Vitamin D deficiency           OBJECTIVE:     Vital Signs (Most Recent)  Vitals:    02/06/24 0724 02/06/24 0748 02/06/24 0809 02/06/24 0853   BP: (!) 209/89 (!) 192/88  (!) 196/90   BP Location: Right arm Right arm  Right arm   Patient Position: Lying      Pulse: (!) 58 63 63 61   Resp: 18  18    Temp: 98 °F (36.7 °C)      TempSrc: Oral      SpO2: 96%  96%    Weight:       Height:             Date 02/06/24 0700 - 02/07/24 0659   Shift 8276-1529 3260-5941 8070-2513 24 Hour Total   INTAKE   Shift Total(mL/kg)       OUTPUT    Urine(mL/kg/hr) 300   300   Shift Total(mL/kg) 300(4)   300(4)   Weight (kg) 75.1 75.1 75.1 75.1             Medications:   sodium chloride 0.9%   Intravenous Once    albuterol-ipratropium  3 mL Nebulization Q4H    carvediloL  25 mg Oral BID WM    isosorbide mononitrate  60 mg Oral Daily    levothyroxine  75 mcg Oral Before breakfast    mupirocin   Nasal BID    potassium chloride  40 mEq Oral Once    sodium bicarbonate  1,300 mg Oral TID           Physical Exam  Vitals and nursing note reviewed.   Constitutional:       General: He is not in acute distress.     Appearance: He is not diaphoretic.   HENT:      Head: Normocephalic and atraumatic.      Mouth/Throat:      Pharynx: No oropharyngeal exudate.   Eyes:      General: No scleral icterus.     Conjunctiva/sclera: Conjunctivae normal.      Pupils: Pupils are equal, round, and reactive to light.   Cardiovascular:      Rate and Rhythm: Normal rate and regular rhythm.      Heart sounds: Normal heart sounds. No murmur heard.  Pulmonary:      Effort: Pulmonary effort is normal. No respiratory distress.      Breath sounds: No rales.   Abdominal:      General: Bowel sounds are normal. There is no distension.      Palpations: Abdomen is soft.      Tenderness: There is no abdominal tenderness.   Musculoskeletal:         General: Normal range of motion.      Cervical back: Normal range of motion and neck supple.      Right lower leg: No edema.      Left lower leg: No edema.      Comments: [facility] CVC    Skin:     General: Skin is warm and dry.      Findings: No erythema.   Neurological:      Mental Status: He is alert and oriented to person, place, and time.      Cranial Nerves: No cranial nerve deficit.      Comments:     Psychiatric:         Mood and Affect: Affect normal.         Cognition and Memory: Memory normal.         Judgment: Judgment normal.         Laboratory:  Recent Labs   Lab 02/04/24  0345 02/05/24  0327 02/06/24  0420   WBC 3.49* 2.98* 4.39   HGB 8.7* 8.5*  8.5*   HCT 24.3* 23.5* 24.7*   PLT 73* 75* 101*   MONO 9.5  0.3 15.8*  0.5 11.8  0.5   EOSINOPHIL 5.2 4.4 4.1       Recent Labs   Lab 02/04/24 0345 02/05/24 0327 02/06/24  0420   * 139 138   K 2.8* 3.1* 3.6    105 107   CO2 17* 23 24   BUN 94* 53* 55*   CREATININE 9.9* 6.6* 6.8*   CALCIUM 7.9* 7.8* 7.9*   PHOS 7.4* 4.5 4.2         Diagnostic Results:  X-Ray: Reviewed  US: Reviewed  Echo: Reviewed  ASSESSMENT/PLAN:     BRETT on CKD4 - Cr baseline 2.8 Proteinuria    -- Prior work up noncontributory  Only TOOTIE + non specific   -- Following with Dr Loly Payne in clinic for his CKD4  Cr trending up not clear etiology needs Kidney biopsy was on Plavix now off - presumed Kidney Biopsy Monday by IR appreciate assistance.   --   LFTs improving  but Cr trending  up since admission 3.2=>3.5=>3.9=>4.6 =>6.4 => 7.6=> 8.1=>9.4 =>9.9   DDx ATN, AIN, GN   Decreases Plts - no schistocytes ?  Acute illness,   LFTs better,  WBCs trending down    hold off on Steroids   -- reorder extensive Serological and immunological work up just incase first one was false negative. - so far non contributory   -- Had Asterixis Trialysis  line  2/4 t Dialysis  2hr in anticipation of Kidney biopsy 2/6  to avoid plt disorder from uremia.  Good UOP  2.4L K+ low ? Recovery of ATN   No indication for HD today - Cr stable - hopefully recovery      Hypokalemia   -- replace PO     Hyponatremia 128   -- Osm with in range 289   -- Concerning for other protein/Urea contribution to S OSm     2. HTN   controlled   3. Anemia of chronic kidney disease    Recent Labs   Lab 02/04/24 0345 02/05/24 0327 02/06/24  0420   HGB 8.7* 8.5* 8.5*   HCT 24.3* 23.5* 24.7*   PLT 73* 75* 101*         Iron   Lab Results   Component Value Date    IRON 60 03/14/2023    TIBC 299 03/14/2023    FERRITIN 191 03/14/2023       4. MBD (E88.9 M90.80) - PTH at goal   Recent Labs   Lab 02/06/24  0420   CALCIUM 7.9*   PHOS 4.2       Recent Labs   Lab 02/04/24  0345  02/05/24  0327 02/06/24  0420   MG 2.3 1.9 1.8         Lab Results   Component Value Date    PTH 60.6 08/11/2021    CALCIUM 7.9 (L) 02/06/2024    CAION 1.31 07/14/2020    PHOS 4.2 02/06/2024     Lab Results   Component Value Date    [id number] 42 08/11/2021       Lab Results   Component Value Date    CO2 24 02/06/2024       5. A-fib -  per primary   6. Nutrition/Hypoalbuminemia    Recent Labs   Lab 01/30/24  1131 01/31/24  0318 02/01/24  0126 02/02/24  0401 02/05/24  0327 02/06/24  0420   LABPROT 14.0*  --  11.4  --   --   --    ALBUMIN  --    < > 1.9*   < > 2.0* 2.1*    < > = values in this interval not displayed.       Nepro with meals TID.       Thank you for the consult, will follow  With any question please call [phone]  Nena Arriola MD    [facility]    ESPERANZA Payne MD,   MD BRANDY Arredondo MD E. V. Harmon, NP    [address]  [place], [address] [phone]

## 2024-02-06 NOTE — PROGRESS NOTES
Hep B Surface AB lab results available - CM to email to Jenni with [facility] this am.    Per Jenni with [facility] -- the only available slot at [facility] is [facility] Sa 2nd shift - start time 11:30 - CM will speak with pt to confirm.      Jenni confirmed receipt of requested lab value.      Per therapy - no therapy indicated / no dme recc     Pt for access plcmt today     CM will continue to monitor pt to determine d/c needs - pt may need OP HD set up.    wife Karmen  [phone]     dx:  Viral Syn, BRETT, COPD, fever  acute liver injury and acute on chronic kidney injury

## 2024-02-07 LAB
ALBUMIN SERPL BCP-MCNC: 2 G/DL (ref 3.5–5.2)
ALP SERPL-CCNC: 66 U/L (ref 55–135)
ALT SERPL W/O P-5'-P-CCNC: 154 U/L (ref 10–44)
ANION GAP SERPL CALC-SCNC: 9 MMOL/L (ref 8–16)
AST SERPL-CCNC: 63 U/L (ref 10–40)
BASOPHILS # BLD AUTO: 0.01 K/UL (ref 0–0.2)
BASOPHILS NFR BLD: 0.2 % (ref 0–1.9)
BILIRUB SERPL-MCNC: 0.5 MG/DL (ref 0.1–1)
BUN SERPL-MCNC: 56 MG/DL (ref 8–23)
CALCIUM SERPL-MCNC: 7.5 MG/DL (ref 8.7–10.5)
CHLORIDE SERPL-SCNC: 105 MMOL/L (ref 95–110)
CO2 SERPL-SCNC: 23 MMOL/L (ref 23–29)
CREAT SERPL-MCNC: 6.8 MG/DL (ref 0.5–1.4)
DIFFERENTIAL METHOD BLD: ABNORMAL
EOSINOPHIL # BLD AUTO: 0.2 K/UL (ref 0–0.5)
EOSINOPHIL NFR BLD: 3.7 % (ref 0–8)
ERYTHROCYTE [DISTWIDTH] IN BLOOD BY AUTOMATED COUNT: 13 % (ref 11.5–14.5)
EST. GFR  (NO RACE VARIABLE): 9 ML/MIN/1.73 M^2
GLUCOSE SERPL-MCNC: 97 MG/DL (ref 70–110)
HCT VFR BLD AUTO: 23.4 % (ref 40–54)
HGB BLD-MCNC: 8.2 G/DL (ref 14–18)
IMM GRANULOCYTES # BLD AUTO: 0.04 K/UL (ref 0–0.04)
IMM GRANULOCYTES NFR BLD AUTO: 0.8 % (ref 0–0.5)
LYMPHOCYTES # BLD AUTO: 1 K/UL (ref 1–4.8)
LYMPHOCYTES NFR BLD: 21.1 % (ref 18–48)
MAGNESIUM SERPL-MCNC: 1.6 MG/DL (ref 1.6–2.6)
MCH RBC QN AUTO: 29.1 PG (ref 27–31)
MCHC RBC AUTO-ENTMCNC: 35 G/DL (ref 32–36)
MCV RBC AUTO: 83 FL (ref 82–98)
MONOCYTES # BLD AUTO: 0.6 K/UL (ref 0.3–1)
MONOCYTES NFR BLD: 11.4 % (ref 4–15)
NEUTROPHILS # BLD AUTO: 3 K/UL (ref 1.8–7.7)
NEUTROPHILS NFR BLD: 62.8 % (ref 38–73)
NRBC BLD-RTO: 0 /100 WBC
PHOSPHATE SERPL-MCNC: 4.4 MG/DL (ref 2.7–4.5)
PLATELET # BLD AUTO: 121 K/UL (ref 150–450)
PMV BLD AUTO: 11.3 FL (ref 9.2–12.9)
POTASSIUM SERPL-SCNC: 3.3 MMOL/L (ref 3.5–5.1)
PROT SERPL-MCNC: 5.2 G/DL (ref 6–8.4)
RBC # BLD AUTO: 2.82 M/UL (ref 4.6–6.2)
SODIUM SERPL-SCNC: 137 MMOL/L (ref 136–145)
WBC # BLD AUTO: 4.83 K/UL (ref 3.9–12.7)

## 2024-02-07 PROCEDURE — 25000003 PHARM REV CODE 250

## 2024-02-07 PROCEDURE — 84100 ASSAY OF PHOSPHORUS: CPT

## 2024-02-07 PROCEDURE — 25000003 PHARM REV CODE 250: Performed by: STUDENT IN AN ORGANIZED HEALTH CARE EDUCATION/TRAINING PROGRAM

## 2024-02-07 PROCEDURE — 36415 COLL VENOUS BLD VENIPUNCTURE: CPT

## 2024-02-07 PROCEDURE — 94761 N-INVAS EAR/PLS OXIMETRY MLT: CPT

## 2024-02-07 PROCEDURE — 94640 AIRWAY INHALATION TREATMENT: CPT

## 2024-02-07 PROCEDURE — 63600175 PHARM REV CODE 636 W HCPCS

## 2024-02-07 PROCEDURE — 11000001 HC ACUTE MED/SURG PRIVATE ROOM

## 2024-02-07 PROCEDURE — 99233 SBSQ HOSP IP/OBS HIGH 50: CPT | Mod: NSCH,,, | Performed by: PHYSICIAN ASSISTANT

## 2024-02-07 PROCEDURE — 83735 ASSAY OF MAGNESIUM: CPT

## 2024-02-07 PROCEDURE — 25000242 PHARM REV CODE 250 ALT 637 W/ HCPCS

## 2024-02-07 PROCEDURE — 85025 COMPLETE CBC W/AUTO DIFF WBC: CPT

## 2024-02-07 PROCEDURE — 99900035 HC TECH TIME PER 15 MIN (STAT)

## 2024-02-07 PROCEDURE — 80053 COMPREHEN METABOLIC PANEL: CPT

## 2024-02-07 RX ORDER — AMLODIPINE BESYLATE 5 MG/1
5 TABLET ORAL DAILY
Status: DISCONTINUED | OUTPATIENT
Start: 2024-02-07 | End: 2024-02-07

## 2024-02-07 RX ORDER — SPIRONOLACTONE 25 MG/1
25 TABLET ORAL DAILY
Status: DISCONTINUED | OUTPATIENT
Start: 2024-02-07 | End: 2024-02-07

## 2024-02-07 RX ORDER — SPIRONOLACTONE 25 MG/1
25 TABLET ORAL 2 TIMES DAILY
Status: DISCONTINUED | OUTPATIENT
Start: 2024-02-07 | End: 2024-02-08

## 2024-02-07 RX ORDER — ISOSORBIDE MONONITRATE 30 MG/1
30 TABLET, EXTENDED RELEASE ORAL ONCE
Status: DISCONTINUED | OUTPATIENT
Start: 2024-02-07 | End: 2024-02-08

## 2024-02-07 RX ORDER — HYDRALAZINE HYDROCHLORIDE 20 MG/ML
5 INJECTION INTRAMUSCULAR; INTRAVENOUS EVERY 6 HOURS PRN
Status: DISCONTINUED | OUTPATIENT
Start: 2024-02-07 | End: 2024-02-08

## 2024-02-07 RX ORDER — MAGNESIUM SULFATE HEPTAHYDRATE 40 MG/ML
2 INJECTION, SOLUTION INTRAVENOUS
Status: COMPLETED | OUTPATIENT
Start: 2024-02-07 | End: 2024-02-07

## 2024-02-07 RX ORDER — POTASSIUM CHLORIDE 20 MEQ/1
40 TABLET, EXTENDED RELEASE ORAL EVERY 4 HOURS
Status: COMPLETED | OUTPATIENT
Start: 2024-02-07 | End: 2024-02-07

## 2024-02-07 RX ORDER — ISOSORBIDE MONONITRATE 30 MG/1
90 TABLET, EXTENDED RELEASE ORAL DAILY
Status: DISCONTINUED | OUTPATIENT
Start: 2024-02-08 | End: 2024-02-08

## 2024-02-07 RX ORDER — HYDRALAZINE HYDROCHLORIDE 25 MG/1
50 TABLET, FILM COATED ORAL EVERY 8 HOURS
Status: DISCONTINUED | OUTPATIENT
Start: 2024-02-07 | End: 2024-02-09 | Stop reason: HOSPADM

## 2024-02-07 RX ORDER — HYDRALAZINE HYDROCHLORIDE 20 MG/ML
10 INJECTION INTRAMUSCULAR; INTRAVENOUS EVERY 6 HOURS PRN
Status: DISCONTINUED | OUTPATIENT
Start: 2024-02-07 | End: 2024-02-07

## 2024-02-07 RX ADMIN — MAGNESIUM SULFATE HEPTAHYDRATE 2 G: 40 INJECTION, SOLUTION INTRAVENOUS at 03:02

## 2024-02-07 RX ADMIN — SODIUM BICARBONATE 650 MG TABLET 1300 MG: at 02:02

## 2024-02-07 RX ADMIN — ISOSORBIDE MONONITRATE 60 MG: 30 TABLET, EXTENDED RELEASE ORAL at 08:02

## 2024-02-07 RX ADMIN — SPIRONOLACTONE 25 MG: 25 TABLET, FILM COATED ORAL at 09:02

## 2024-02-07 RX ADMIN — SODIUM BICARBONATE 650 MG TABLET 1300 MG: at 09:02

## 2024-02-07 RX ADMIN — IPRATROPIUM BROMIDE AND ALBUTEROL SULFATE 3 ML: .5; 3 SOLUTION RESPIRATORY (INHALATION) at 07:02

## 2024-02-07 RX ADMIN — IPRATROPIUM BROMIDE AND ALBUTEROL SULFATE 3 ML: .5; 3 SOLUTION RESPIRATORY (INHALATION) at 03:02

## 2024-02-07 RX ADMIN — LEVOTHYROXINE SODIUM 75 MCG: 75 TABLET ORAL at 06:02

## 2024-02-07 RX ADMIN — POTASSIUM CHLORIDE 40 MEQ: 1500 TABLET, EXTENDED RELEASE ORAL at 10:02

## 2024-02-07 RX ADMIN — SPIRONOLACTONE 25 MG: 25 TABLET, FILM COATED ORAL at 02:02

## 2024-02-07 RX ADMIN — SODIUM BICARBONATE 650 MG TABLET 1300 MG: at 08:02

## 2024-02-07 RX ADMIN — HYDRALAZINE HYDROCHLORIDE 10 MG: 20 INJECTION, SOLUTION INTRAMUSCULAR; INTRAVENOUS at 07:02

## 2024-02-07 RX ADMIN — MAGNESIUM SULFATE HEPTAHYDRATE 2 G: 40 INJECTION, SOLUTION INTRAVENOUS at 02:02

## 2024-02-07 RX ADMIN — CARVEDILOL 25 MG: 25 TABLET, FILM COATED ORAL at 04:02

## 2024-02-07 RX ADMIN — MUPIROCIN: 20 OINTMENT TOPICAL at 08:02

## 2024-02-07 RX ADMIN — CARVEDILOL 25 MG: 25 TABLET, FILM COATED ORAL at 08:02

## 2024-02-07 RX ADMIN — HYDRALAZINE HYDROCHLORIDE 50 MG: 25 TABLET, FILM COATED ORAL at 09:02

## 2024-02-07 RX ADMIN — POTASSIUM CHLORIDE 40 MEQ: 1500 TABLET, EXTENDED RELEASE ORAL at 02:02

## 2024-02-07 RX ADMIN — MAGNESIUM SULFATE HEPTAHYDRATE 2 G: 40 INJECTION, SOLUTION INTRAVENOUS at 10:02

## 2024-02-07 RX ADMIN — AMLODIPINE BESYLATE 5 MG: 5 TABLET ORAL at 10:02

## 2024-02-07 RX ADMIN — MUPIROCIN: 20 OINTMENT TOPICAL at 09:02

## 2024-02-07 RX ADMIN — HYDROXYZINE PAMOATE 25 MG: 25 CAPSULE ORAL at 10:02

## 2024-02-07 RX ADMIN — IPRATROPIUM BROMIDE AND ALBUTEROL SULFATE 3 ML: .5; 3 SOLUTION RESPIRATORY (INHALATION) at 12:02

## 2024-02-07 RX ADMIN — HYDRALAZINE HYDROCHLORIDE 50 MG: 25 TABLET, FILM COATED ORAL at 02:02

## 2024-02-07 NOTE — NURSING
IR unable to complete biopsy, patient's SBP needs to be below 160. Patient to be NPO after midnight, IR to attempt biopsy on 2/7/2024.

## 2024-02-07 NOTE — PROGRESS NOTES
"Nephrology Progress  Note     Consult Requested By: Khushboo Chapa MD  Reason for Consult: BRETT on CKD4     SUBJECTIVE:        ?    Review of Systems   Constitutional:  Negative for chills and fever.   HENT:  Negative for congestion and sore throat.    Eyes:  Negative for blurred vision, double vision and photophobia.   Respiratory:  Negative for cough and shortness of breath.    Cardiovascular:  Negative for chest pain, palpitations and leg swelling.   Gastrointestinal:  Negative for abdominal pain, diarrhea, nausea and vomiting.   Genitourinary:  Negative for dysuria and urgency.   Musculoskeletal:  Negative for joint pain and myalgias.   Skin:  Negative for itching and rash.   Neurological:  Positive for weakness. Negative for dizziness, sensory change and headaches.   Endo/Heme/Allergies:  Negative for polydipsia. Does not bruise/bleed easily.   Psychiatric/Behavioral:  Negative for depression.        Past Medical History:   Diagnosis Date    Allergy     Anemia     Anticoagulant long-term use     Arthritis     CKD (chronic kidney disease) stage 4, GFR 15-29 ml/min     COPD (chronic obstructive pulmonary disease) 08/20/2021    Coronary artery disease     Heart attack 10/04/2019    Hematuria     Hemothorax     Hyperlipidemia     Hypertension     Hyperuricemia     Hypocalcemia     Hypokalemia     Hypophosphatemia     Hypothyroidism 3/2/2023    PAD (peripheral artery disease)     Proteinuria     Vitamin D deficiency           OBJECTIVE:     Vital Signs (Most Recent)  Vitals:    02/07/24 0739 02/07/24 0827 02/07/24 0907 02/07/24 1053   BP:   (!) 178/80 (!) 148/65   BP Location:   Right arm    Patient Position:   Lying    Pulse: 63  (!) 58 (!) 58   Resp: 17   18   Temp:    97.7 °F (36.5 °C)   TempSrc:    Oral   SpO2: 96%   97%   Weight:  75.1 kg (165 lb 9.1 oz)     Height:  5' 10" (1.778 m)           Date 02/07/24 0700 - 02/08/24 0659   Shift 5629-6491 4299-8031 9621-9365 24 Hour Total   INTAKE   Shift Total(mL/kg) "       OUTPUT   Urine(mL/kg/hr) 225   225   Shift Total(mL/kg) 225(3)   225(3)   Weight (kg) 75.1 75.1 75.1 75.1             Medications:   sodium chloride 0.9%   Intravenous Once    albuterol-ipratropium  3 mL Nebulization Q4H    carvediloL  25 mg Oral BID WM    hydrALAZINE  50 mg Oral Q8H    isosorbide mononitrate  30 mg Oral Once    isosorbide mononitrate  60 mg Oral Daily    levothyroxine  75 mcg Oral Before breakfast    magnesium sulfate IVPB  2 g Intravenous Q2H    mupirocin   Nasal BID    potassium chloride  40 mEq Oral Q4H    sodium bicarbonate  1,300 mg Oral TID    spironolactone  25 mg Oral Daily           Physical Exam  Vitals and nursing note reviewed.   Constitutional:       General: He is not in acute distress.     Appearance: He is not diaphoretic.   HENT:      Head: Normocephalic and atraumatic.      Mouth/Throat:      Pharynx: No oropharyngeal exudate.   Eyes:      General: No scleral icterus.     Conjunctiva/sclera: Conjunctivae normal.      Pupils: Pupils are equal, round, and reactive to light.   Cardiovascular:      Rate and Rhythm: Normal rate and regular rhythm.      Heart sounds: Normal heart sounds. No murmur heard.  Pulmonary:      Effort: Pulmonary effort is normal. No respiratory distress.      Breath sounds: No rales.   Abdominal:      General: Bowel sounds are normal. There is no distension.      Palpations: Abdomen is soft.      Tenderness: There is no abdominal tenderness.   Musculoskeletal:         General: Normal range of motion.      Cervical back: Normal range of motion and neck supple.      Right lower leg: No edema.      Left lower leg: No edema.      Comments: [facility] CVC    Skin:     General: Skin is warm and dry.      Findings: No erythema.   Neurological:      Mental Status: He is alert and oriented to person, place, and time.      Cranial Nerves: No cranial nerve deficit.      Comments:     Psychiatric:         Mood and Affect: Affect normal.         Cognition and Memory: Memory  normal.         Judgment: Judgment normal.         Laboratory:  Recent Labs   Lab 02/05/24 0327 02/06/24  0420 02/07/24  0342   WBC 2.98* 4.39 4.83   HGB 8.5* 8.5* 8.2*   HCT 23.5* 24.7* 23.4*   PLT 75* 101* 121*   MONO 15.8*  0.5 11.8  0.5 11.4  0.6   EOSINOPHIL 4.4 4.1 3.7       Recent Labs   Lab 02/05/24 0327 02/06/24 0420 02/07/24  0342    138 137   K 3.1* 3.6 3.3*    107 105   CO2 23 24 23   BUN 53* 55* 56*   CREATININE 6.6* 6.8* 6.8*   CALCIUM 7.8* 7.9* 7.5*   PHOS 4.5 4.2 4.4         Diagnostic Results:  X-Ray: Reviewed  US: Reviewed  Echo: Reviewed  ASSESSMENT/PLAN:     BRETT on CKD4 - Cr baseline 2.8 Proteinuria    -- Prior work up noncontributory  Only TOOTIE + non specific   -- Following with Dr Loly Payne in clinic for his CKD4  Cr trending up not clear etiology needs Kidney biopsy was on Plavix now off - presumed Kidney Biopsy Monday by IR appreciate assistance.   --   LFTs improving  but Cr trending  up since admission 3.2=>3.5=>3.9=>4.6 =>6.4 => 7.6=> 8.1=>9.4 =>9.9   DDx ATN, AIN, GN   Decreases Plts - no schistocytes ?  Acute illness,   LFTs better,  WBCs trending down    hold off on Steroids   -- reorder extensive Serological and immunological work up just incase first one was false negative. - so far non contributory   -- Had Asterixis Trialysis  line  2/4 t Dialysis  2hr in anticipation of Kidney biopsy   to avoid plt disorder from uremia.  Good UOP   K+ low ? Recovery of ATN   No indication for HD today - Cr stable - hopefully recovery   -- Biopsy postponed due to HTN goal SBP<140 for 24 hr per IR      Hypokalemia   -- replace PO     Hyponatremia 128   -- Osm with in range 289   -- Concerning for other protein/Urea contribution to S OSm     2. HTN   not controlled   -- Increase Imdur to 90 daily   -- add Hydralazine 50 TID - NEW   -- Aldactone 25 BID - NEW   -- Coreg 25 BID     3. Anemia of chronic kidney disease    Recent Labs   Lab 02/05/24  0327 02/06/24  0420 02/07/24  0342    HGB 8.5* 8.5* 8.2*   HCT 23.5* 24.7* 23.4*   PLT 75* 101* 121*         Iron   Lab Results   Component Value Date    IRON 60 03/14/2023    TIBC 299 03/14/2023    FERRITIN 191 03/14/2023       4. MBD (E88.9 M90.80) - PTH at goal   Recent Labs   Lab 02/07/24  0342   CALCIUM 7.5*   PHOS 4.4       Recent Labs   Lab 02/05/24  0327 02/06/24  0420 02/07/24  0342   MG 1.9 1.8 1.6         Lab Results   Component Value Date    PTH 60.6 08/11/2021    CALCIUM 7.5 (L) 02/07/2024    CAION 1.31 07/14/2020    PHOS 4.4 02/07/2024     Lab Results   Component Value Date    [id number] 42 08/11/2021       Lab Results   Component Value Date    CO2 23 02/07/2024       5. A-fib -  per primary   6. Nutrition/Hypoalbuminemia    Recent Labs   Lab 02/01/24  0126 02/02/24  0401 02/06/24  0420 02/07/24  0342   LABPROT 11.4  --   --   --    ALBUMIN 1.9*   < > 2.1* 2.0*    < > = values in this interval not displayed.       Nepro with meals TID.       Thank you for the consult, will follow  With any question please call [phone]  Nena Arriola MD    [facility]    ESPERANZA Payne MD,   MD BRANDY Arredondo MD E. V. Harmon, NP    [address]  [place], [address] [phone]

## 2024-02-07 NOTE — PHYSICIAN QUERY
PT Name: Domingo Gross  MR #: [id number]    DOCUMENTATION CLARIFICATION      CDS: Gauri Alexandra RN, CCDS   Contact Information: [email or web address]    This form is a permanent document in the medical record.      Query Date: February 7, 2024    By submitting this query, we are merely seeking further clarification of documentation. Please utilize your independent clinical judgment when addressing the question(s) below.       Indicators Supporting Clinical Findings Location in Medical Record   x Anemia, Thrombocytopenia, Neutropenia, Pancytopenia documented Smear reviewed. WBCs: Increased neutrophils. Anemia. Thrombocytopenia. No clumping.    Anemia of chronic kidney disease 1/31/24 Pathologist Review Peripheral Smear    1/30/24-2/7/24 Nephrology   x Labs  RBC Hgb Hct Platelet Count Gran # (ANC)   2/7/24 2.82 8.2 23.4 121 3.0   2/6/24 2.97 8.5 24.7 101 2.8   2/5/24 2.92 8.5 23.5 75 1.7   2/1/24 2.84 8.2 24.0 64 2.7   1/30/24 3.14 9.1 26.5 68 4.5   1/28/24 2.95 8.8 24.1 76 4.5   1/27/24 3.63 10.6 30.5 118 No Result    Labs    Transfusion(s)     x Treatments: Daily CBC Orders   x Acute/ Chronic illness BRETT w/ baseline CKD. Patient with progressively changing mental status, BUN/Cr worsening. Asterixis noted on exam.   Uremia     PMHx of HTN, Atrial Fibrillation, COPD    Course complicated by acute liver injury and acute on chronic kidney injury.  2/3/24 Hospital Medicine    Other:     Pancytopenia is defined by:  Hb < 12g/dL (non-pregnant women) or <13g/dL (men) + ANC < 1800/microL + Platelets < 150,000/microL    The clinical guidelines noted are only a system guideline. It does not replace the providers clinical judgment.      Provider, please specify diagnosis or diagnoses associated with above clinical findings.  [Dedrick all that apply]    [ x ] Thrombocytopenia   [   ] Pancytopenia due to liver disease   [   ] Pancytopenia, unspecified   [   ] Other Hematological Diagnosis (please specify):  ________   [  ] Clinically Undetermined             Present on admission (POA) status:  [ x ]  Yes (Y)   [   ]  No (N)   [   ]  Documentation insufficient to determine if condition is POA (U)   [  ]  Clinically Undetermined (W)     Please document in your progress notes daily for the duration of treatment, until resolved, and include in your discharge summary.    Reference:    [employer]WIN Post (n.d.). Approach to the adult with pancytopenia. [employer]. Retrieved September 7, 2022, from [email or web address]/[email or web address]-to-the-adult-with-pancytopenia?search=pancytopenia&source=search_result&selectedTitle=1~150&usage_type=default&display_rank=1    Form No. 51350

## 2024-02-07 NOTE — ASSESSMENT & PLAN NOTE
Patient with documented diagnosis of Atrial Fibrillation per notes by Cardiologist Dmitriy Chavarria MD reports being compliant with the following home regimen: Apixaban 5mg BID, Amiodarone. EKG taken during admission reveals Aflutter/A Fib    Plan:  - [id number] Score: 1  - Will hold Amiodarone in setting of transaminitis.

## 2024-02-07 NOTE — PHYSICIAN QUERY
PT Name: Domingo Gross  MR #: [id number]    DOCUMENTATION CLARIFICATION     CDS: Gauri Alexandra RN, CCDS   Contact Information: [email or web address]  This form is a permanent document in the medical record.     Query Date: February 7, 2024    By submitting this query, we are merely seeking further clarification of documentation. Please utilize your independent clinical judgment when addressing the question(s) below.    The Medical Record contains the following:   Indicators   Supporting Clinical Findings Location in Medical Record   x AMS, Confusion,  LOC, etc.  Unfortunately, has had worsening of uremia today with + asterixis, uremic encephalopathy on exam.   Patient lethargic today, worsened from yesterday.  UOP continues to decrease, 750cc past 24 hours.  Oriented to person and place, not to time 2/3/24 Hospital Medicine   x Acute/Chronic Illness BRETT w/ baseline CKD. Patient with progressively changing mental status, BUN/Cr worsening. Asterixis noted on exam.   Uremia    PMHx of HTN, Atrial Fibrillation, COPD 2/3/24 Hospital Medicine   x Treatment         - Nephrology consulted, appreciate recommendations.  - Urine Studies suggesting intrinsic etiology (FeNa 3.9%)  - Strict I&O  - Avoid nephrotoxins and renally dose meds  - Plan for renal biopsy Mon 2/6  - IR consulted, appreciate recommendations.   - Plan for dialysis Sat 2/3.    HD 2/4  2/3/24 Hospital Medicine                2/5/24 Hospital Medicine   x Other  01/27/24 10:46 02/03/24 02:57 02/04/24 03:45   BUN 21 95 (H) 94 (H)   Creatinine 3.2 (H) 9.4 (H) 9.9 (H)   eGFR 21 ! 6 ! 5 !    Labs     The noted clinical guidelines are only system guidelines and do not replace the providers clinical judgment.    The National [place] of Neurologic Disorders and Stroke (NINDS) of the NIH describes encephalopathy as any diffuse disease of the brain that alters brain function or structure.    Provider, please further specify the documented  Encephalopathy:    [ x ] Metabolic Encephalopathy - Due to electrolyte imbalance, metabolic derangements, or infectious processes, includes Septic Encephalopathy, Uremic Encephalopathy   [   ] Encephalopathy, unspecified      [   ] Other Encephalopathy (please specify): ____________________   [   ] Other Clarification (please specify condition): __________   [   ]  Clinically Undetermined     Present on Admission (POA) Status:    [ x ] Yes (Y)   [   ] No (N)   [   ] Clinically Undetermined (W)   [   ] Documentation insufficient to determine if condition is POA (U)     Please document in your progress notes daily for the duration of treatment until resolved, and include in your discharge summary.    References:  SANTOSH Bui RN, CCDS. (2018, June 9). Notes from the Instructor: Encephalopathy tips. Retrieved October 22, 2020, from [email or web address]/articles/[email or web address]-[email or web address]-encephalopathy-tips    ICD-9-CM Coding Clinic First Quarter 2013, Effective with discharges: October 21, 2013 [facility] § Seizure with encephalopathy due to postictal state (2013).    ICD-10-CM/PCS [employer] Integrated Codebook (Version V.20.8.10.0) [Computer software]. (2020). Retrieved October 21, 2020.    National [place] of Neurological Disorders and Stroke. (2019, March 27). Retrieved October 22, 2020, from https://www.ninds.nih.gov/Disorders/All-Disorders/Wzdeyurufilkxd-Ofleiajhcod-Wiib    Form No. 79638

## 2024-02-07 NOTE — SUBJECTIVE & OBJECTIVE
Interval History: BP elevated on exam. Asymptomatic. No adverse events overnight.    Review of Systems   Constitutional:  Negative for appetite change, chills and fever.   HENT:  Negative for rhinorrhea, sore throat and trouble swallowing.    Eyes:  Negative for pain and visual disturbance.   Respiratory:  Negative for cough and shortness of breath.    Cardiovascular:  Negative for chest pain, palpitations and leg swelling.   Gastrointestinal:  Negative for abdominal pain, constipation, diarrhea, nausea and vomiting.   Genitourinary:  Negative for dysuria and hematuria.   Musculoskeletal:  Negative for gait problem and neck stiffness.   Skin:  Negative for rash and wound.   Neurological:  Negative for tremors, weakness and headaches.   Psychiatric/Behavioral:  Negative for agitation and confusion.      Objective:     Vital Signs (Most Recent):  Temp: 97.7 °F (36.5 °C) (02/07/24 1053)  Pulse: (!) 58 (02/07/24 1053)  Resp: 18 (02/07/24 1053)  BP: (!) 148/65 (02/07/24 1053)  SpO2: 97 % (02/07/24 1053) Vital Signs (24h Range):  Temp:  [96.7 °F (35.9 °C)-98.8 °F (37.1 °C)] 97.7 °F (36.5 °C)  Pulse:  [52-75] 58  Resp:  [14-21] 18  SpO2:  [93 %-98 %] 97 %  BP: (146-216)/(65-92) 148/65     Weight: 75.1 kg (165 lb 9.1 oz)  Body mass index is 23.76 kg/m².    Intake/Output Summary (Last 24 hours) at 2/7/2024 1152  Last data filed at 2/7/2024 1120  Gross per 24 hour   Intake --   Output 1400 ml   Net -1400 ml         Physical Exam  Vitals and nursing note reviewed.   Constitutional:       General: He is not in acute distress.     Appearance: Normal appearance. He is normal weight. He is not ill-appearing or toxic-appearing.   HENT:      Head: Normocephalic.      Right Ear: External ear normal.      Left Ear: External ear normal.      Nose: Nose normal.      Mouth/Throat:      Pharynx: Oropharynx is clear.   Eyes:      Extraocular Movements: Extraocular movements intact.   Neck:      Comments: [facility]  Cardiovascular:      Rate and  Rhythm: Normal rate and regular rhythm.      Pulses: Normal pulses.   Pulmonary:      Effort: Pulmonary effort is normal. No respiratory distress.      Breath sounds: No wheezing, rhonchi or rales.   Abdominal:      General: Abdomen is flat. There is no distension.      Palpations: Abdomen is soft.      Tenderness: There is no abdominal tenderness. There is no guarding or rebound.   Musculoskeletal:         General: Normal range of motion.      Cervical back: Normal range of motion.      Right lower leg: No edema.      Left lower leg: No edema.   Skin:     General: Skin is warm.      Coloration: Skin is not jaundiced.   Neurological:      General: No focal deficit present.      Mental Status: He is alert and oriented to person, place, and time. Mental status is at baseline.      Motor: No weakness.   Psychiatric:         Mood and Affect: Mood normal.         Behavior: Behavior normal.         Thought Content: Thought content normal.             Significant Labs: All pertinent labs within the past 24 hours have been reviewed.  CBC:   Recent Labs   Lab 02/06/24  0420 02/07/24  0342   WBC 4.39 4.83   HGB 8.5* 8.2*   HCT 24.7* 23.4*   * 121*     CMP:   Recent Labs   Lab 02/06/24  0420 02/07/24  0342    137   K 3.6 3.3*    105   CO2 24 23   GLU 96 97   BUN 55* 56*   CREATININE 6.8* 6.8*   CALCIUM 7.9* 7.5*   PROT 5.4* 5.2*   ALBUMIN 2.1* 2.0*   BILITOT 0.6 0.5   ALKPHOS 75 66   AST 77* 63*   * 154*   ANIONGAP 7* 9       Significant Imaging: I have reviewed all pertinent imaging results/findings within the past 24 hours.

## 2024-02-07 NOTE — PLAN OF CARE
CM met with pt this am - advised him that he has tentatively been set up with OP HD   T Th Sa  11:45 am  Preeti Martini   Nephrologist contacted - he is not yet certain that pt will need OP HD at d/c.    Future Appointments   Date Time Provider Department Center   2/29/2024  7:15 AM LAB, [facility] [place]   3/5/2024  2:40 PM Analy Encarnacion MD [facility]   3/7/2024 11:00 AM Loly Payne MD [facility]   3/13/2024  9:30 AM APPOINTMENT LAB, [facility]   3/13/2024 10:00 AM Robby Reddy MD [facility] HEM ONC [facility]              02/07/24 1644   Post-Acute Status   Post-Acute Authorization Dialysis   Diaylsis Status Set-up Complete/Auth obtained   Discharge Plan   Discharge Plan A Home;Home with family   Discharge Plan B Home;Home with family;Home Health

## 2024-02-07 NOTE — PROGRESS NOTES
"[facility] Medicine  Progress Note    Patient Name: Domingo Gross  MRN: [id number]  Patient Class: IP- Inpatient   Admission Date: 1/27/2024  Length of Stay: 11 days  Attending Physician: Khushboo Chapa MD  Primary Care Provider: Analy Encarnacion MD        Subjective:     Principal Problem:BRETT (acute kidney injury)        HPI:  63-year-old man with PMHx of HTN, Atrial Fibrillation (sees Dr. Calderón, on Amiodarone, Apixaban), COPD (inhaler PRN), CKD (sees Dr. Payne), Hypothyroidism (Levothyroxine) presents via ambulance to [facility] on 1/27/24 for 1-day history of chest pain, cough, irregular breathing. History was collected from patient and wife, who is at bedside.    Patient reports experiencing chest pain, cough, and decreased appetite that started last night. Reports a fever of 102, did not take Tylenol. This morning, wife noticed that he was breathing "funny" and called the ambulance. Patient's boss recently tested positive for Flu/COVID. Patient emphasized that lack of food intake was due to decreased appetite and denied emesis and nausea.    Currently patient denies any chest pain or difficulty breathing. He reports taking potassium at home & being compliant with all medications. He is a former smoker and denies drinking alcohol.    In the ER, vitals were as follows - Temp 99.8 -> 102.3, HR 68, /87, O2 94%. Labs were significant for K 2.4, Magnesium 1.3, BUN/Cr 21/3.2 (baseline 2.8), AST//302, Alk phos 51 WBC 6.15, Procal 0.28. Patient was given 40mEq of potassium PO, 10mEq of KCl and admitted to [facility] for management/further workup of of Hypokalemia, BRETT, PNA, Transaminitis.    Overview/Hospital Course:  No notes on file    Interval History: BP elevated on exam. Asymptomatic. No adverse events overnight.    Review of Systems   Constitutional:  Negative for appetite change, chills and fever.   HENT:  Negative for rhinorrhea, sore throat and trouble " swallowing.    Eyes:  Negative for pain and visual disturbance.   Respiratory:  Negative for cough and shortness of breath.    Cardiovascular:  Negative for chest pain, palpitations and leg swelling.   Gastrointestinal:  Negative for abdominal pain, constipation, diarrhea, nausea and vomiting.   Genitourinary:  Negative for dysuria and hematuria.   Musculoskeletal:  Negative for gait problem and neck stiffness.   Skin:  Negative for rash and wound.   Neurological:  Negative for tremors, weakness and headaches.   Psychiatric/Behavioral:  Negative for agitation and confusion.      Objective:     Vital Signs (Most Recent):  Temp: 97.7 °F (36.5 °C) (02/07/24 1053)  Pulse: (!) 58 (02/07/24 1053)  Resp: 18 (02/07/24 1053)  BP: (!) 148/65 (02/07/24 1053)  SpO2: 97 % (02/07/24 1053) Vital Signs (24h Range):  Temp:  [96.7 °F (35.9 °C)-98.8 °F (37.1 °C)] 97.7 °F (36.5 °C)  Pulse:  [52-75] 58  Resp:  [14-21] 18  SpO2:  [93 %-98 %] 97 %  BP: (146-216)/(65-92) 148/65     Weight: 75.1 kg (165 lb 9.1 oz)  Body mass index is 23.76 kg/m².    Intake/Output Summary (Last 24 hours) at 2/7/2024 1152  Last data filed at 2/7/2024 1120  Gross per 24 hour   Intake --   Output 1400 ml   Net -1400 ml         Physical Exam  Vitals and nursing note reviewed.   Constitutional:       General: He is not in acute distress.     Appearance: Normal appearance. He is normal weight. He is not ill-appearing or toxic-appearing.   HENT:      Head: Normocephalic.      Right Ear: External ear normal.      Left Ear: External ear normal.      Nose: Nose normal.      Mouth/Throat:      Pharynx: Oropharynx is clear.   Eyes:      Extraocular Movements: Extraocular movements intact.   Neck:      Comments: [facility]  Cardiovascular:      Rate and Rhythm: Normal rate and regular rhythm.      Pulses: Normal pulses.   Pulmonary:      Effort: Pulmonary effort is normal. No respiratory distress.      Breath sounds: No wheezing, rhonchi or rales.   Abdominal:      General:  Abdomen is flat. There is no distension.      Palpations: Abdomen is soft.      Tenderness: There is no abdominal tenderness. There is no guarding or rebound.   Musculoskeletal:         General: Normal range of motion.      Cervical back: Normal range of motion.      Right lower leg: No edema.      Left lower leg: No edema.   Skin:     General: Skin is warm.      Coloration: Skin is not jaundiced.   Neurological:      General: No focal deficit present.      Mental Status: He is alert and oriented to person, place, and time. Mental status is at baseline.      Motor: No weakness.   Psychiatric:         Mood and Affect: Mood normal.         Behavior: Behavior normal.         Thought Content: Thought content normal.             Significant Labs: All pertinent labs within the past 24 hours have been reviewed.  CBC:   Recent Labs   Lab 02/06/24  0420 02/07/24  0342   WBC 4.39 4.83   HGB 8.5* 8.2*   HCT 24.7* 23.4*   * 121*     CMP:   Recent Labs   Lab 02/06/24  0420 02/07/24  0342    137   K 3.6 3.3*    105   CO2 24 23   GLU 96 97   BUN 55* 56*   CREATININE 6.8* 6.8*   CALCIUM 7.9* 7.5*   PROT 5.4* 5.2*   ALBUMIN 2.1* 2.0*   BILITOT 0.6 0.5   ALKPHOS 75 66   AST 77* 63*   * 154*   ANIONGAP 7* 9       Significant Imaging: I have reviewed all pertinent imaging results/findings within the past 24 hours.    Assessment/Plan:      * BRETT (acute kidney injury)  W/ Baseline CKD  Patient with progressively changing mental status, BUN/Cr worsening   Asterixis noted on exam.    Plan:  - Nephrology consulted, appreciate recommendations.  - Urine Studies suggesting intrinsic etiology (FeNa 3.9%)  - Strict I&O  - Avoid nephrotoxins and renally dose meds  - Plan for renal biopsy Tues 2/6  - IR consulted, appreciate recommendations.   - Anesthesia consulted for IJ line placement.  Placed 2/3, replaced 2/4.  - HD 2/4.    Uremia  Plan in BRETT    Hyponatremia  RESOLVED      CMV (cytomegalovirus infection)  Plan:  -  Quantitative CMV DNA negative.  - Not likely acute infection.    EBV infection  EBV IgM and DNA are negative  these serologies likely represent previous infection    Plan:  - ID consulted, appreciate recommendations.      CAP (community acquired pneumonia)  RESOLVED.    Acute liver failure without hepatic coma  IMPROVING    Plan:  Will hold PRN Tylenol  Abdominal ultrasound with no acute abnormalities  Hepatitis panel negative  Additional work-up labs pending.    Hypothyroidism  Patient with reported TSH 5 months ago 13.669    Plan:  Repeat TSH elevated, T4 normal  Increased home levothyroxine     Persistent atrial fibrillation  Patient with documented diagnosis of Atrial Fibrillation per notes by Cardiologist Dmitriy Chavarria MD reports being compliant with the following home regimen: Apixaban 5mg BID, Amiodarone. EKG taken during admission reveals Aflutter/A Fib    Plan:  - [id number] Score: 1  - Will hold Amiodarone in setting of transaminitis.    Hyperlipidemia  Patient with known history of HLD reports being compliant with the following home regimen: Rosuvastatin 40mg QD    Plan:   - Hold statin in setting of elevated transaminitis  - Continue home equivalent Atorvastatin 80 mg QD when appropriate    HTN (hypertension)  Plan:  - Continue home Carvedilol and Imdur  - Continue Eplerenone equivalent, Spironolactone  - Hydralazine TID      VTE Risk Mitigation (From admission, onward)           Ordered     IP VTE HIGH RISK PATIENT  Once         01/27/24 1240     Place sequential compression device  Until discontinued         01/27/24 1240                    Discharge Planning   FLACO:      Code Status: Full Code   Is the patient medically ready for discharge?:     Reason for patient still in hospital (select all that apply): Laboratory test and Consult recommendations  Discharge Plan A: Home, Home with family   Discharge Delays: None known at this time      ________________________  Jude Sanford MD  [facility] [place] [facility] PGY-2

## 2024-02-07 NOTE — PROGRESS NOTES
Interventional Radiology Progress Note      SUBJECTIVE:     History of Present Illness:  Domingo Gross is a 63 y.o. male with a PMHx of HTN, Atrial Fibrillation (sees Dr. Calderón, outpatient on plavix and apixaban), COPD (inhaler PRN), BRETT on CKD 4 with proteinuria to start dialysis (serological and immunological workup negative, repeating) scheduled for kidney biopsy 2/6.  Patient brought to IR pre-procedure area on 2/6 for planned image guided renal biopsy with SBP ~200s. Two doses of hydralazine given with improvement in BP, but persistently elevated above . Therefore, the renal biopsy was deferred.  Pt afebrile and stable.  BP this morning 178/80.    Review of Systems   Constitutional:  Negative for chills, fever, malaise/fatigue and weight loss.   Respiratory:  Negative for cough and shortness of breath.    Cardiovascular:  Negative for chest pain, palpitations and leg swelling.   Gastrointestinal:  Negative for abdominal pain, diarrhea, nausea and vomiting.   Genitourinary:  Negative for dysuria and flank pain.   Musculoskeletal:  Negative for back pain and myalgias.   Neurological:  Negative for weakness and headaches.       Scheduled Meds:   sodium chloride 0.9%   Intravenous Once    albuterol-ipratropium  3 mL Nebulization Q4H    amLODIPine  5 mg Oral Daily    carvediloL  25 mg Oral BID WM    isosorbide mononitrate  60 mg Oral Daily    levothyroxine  75 mcg Oral Before breakfast    magnesium sulfate IVPB  2 g Intravenous Q2H    mupirocin   Nasal BID    potassium chloride  40 mEq Oral Q4H    sodium bicarbonate  1,300 mg Oral TID     Continuous Infusions:  PRN Meds:sodium chloride 0.9%, sodium chloride 0.9%, benzonatate, guaiFENesin 100 mg/5 ml, hydrALAZINE, hydrOXYzine pamoate, LIDOcaine, melatonin, nitroGLYCERIN, ondansetron, senna-docusate 8.6-50 mg, sodium chloride 0.9%, sodium chloride 0.9%    Review of patient's allergies indicates:   Allergen Reactions    Ace inhibitors Rash       Past  Medical History:   Diagnosis Date    Allergy     Anemia     Anticoagulant long-term use     Arthritis     CKD (chronic kidney disease) stage 4, GFR 15-29 ml/min     COPD (chronic obstructive pulmonary disease) 08/20/2021    Coronary artery disease     Heart attack 10/04/2019    Hematuria     Hemothorax     Hyperlipidemia     Hypertension     Hyperuricemia     Hypocalcemia     Hypokalemia     Hypophosphatemia     Hypothyroidism 3/2/2023    PAD (peripheral artery disease)     Proteinuria     Vitamin D deficiency      Past Surgical History:   Procedure Laterality Date    ABDOMINAL AORTOGRAPHY N/A 5/10/2019    Procedure: AORTOGRAM-ABDOMINAL;  Surgeon: Keo Jenkins MD;  Location: [facility] CATH LAB/EP;  Service: Cardiology;  Laterality: N/A;  RSFA intervention     AORTOGRAPHY WITH SERIALOGRAPHY N/A 12/3/2021    Procedure: AORTOGRAM, WITH SERIALOGRAPHY;  Surgeon: Keo Jenkins MD;  Location: [facility] CATH LAB/EP;  Service: Cardiology;  Laterality: N/A;    AORTOGRAPHY WITH SERIALOGRAPHY N/A 4/1/2022    Procedure: AORTOGRAM, WITH SERIALOGRAPHY;  Surgeon: Keo Jenkins MD;  Location: [facility] CATH LAB/EP;  Service: Cardiology;  Laterality: N/A;    ATHERECTOMY, CORONARY N/A 4/25/2023    Procedure: Atherectomy-coronary;  Surgeon: Keo Jenkins MD;  Location: [facility] CATH LAB/EP;  Service: Cardiology;  Laterality: N/A;    BONE MARROW BIOPSY Right 10/12/2021    Procedure: BIOPSY-BONE MARROW;  Surgeon: Naseem Woods MD;  Location: [facility] OR;  Service: Oncology;  Laterality: Right;    CARDIAC CATHETERIZATION      CATARACT EXTRACTION      CATARACT EXTRACTION W/  INTRAOCULAR LENS IMPLANT Right 6/8/2020    Procedure: EXTRACTION, CATARACT, WITH IOL INSERTION;  Surgeon: Tin Light MD;  Location: [facility], [place], operated [facility] OR;  Service: Ophthalmology;  Laterality: Right;    CATARACT EXTRACTION W/  INTRAOCULAR LENS IMPLANT Left 7/2/2020    Procedure: EXTRACTION, CATARACT, WITH IOL INSERTION;  Surgeon: Tin Light MD;  Location: [facility], [place], operated [facility] OR;  Service:  Ophthalmology;  Laterality: Left;    COLONOSCOPY N/A 1/6/2022    Procedure: COLONOSCOPY;  Surgeon: Kathe Penaloza MD;  Location: [facility] ENDO (2ND FLR);  Service: Endoscopy;  Laterality: N/A;  COVID test at [facility] on 1/3-GT  okay to hold Plavix for 5 days and aspirin per Dr. Becerra  2nd floor due toextensive cardiac history   instructions mailed and my [facility] portal -     CORONARY ANGIOGRAPHY N/A 3/29/2019    Procedure: ANGIOGRAM, CORONARY ARTERY;  Surgeon: Keo Jenkins MD;  Location: [facility] CATH LAB/EP;  Service: Cardiology;  Laterality: N/A;    CORONARY ANGIOGRAPHY Left 10/11/2019    Procedure: ANGIOGRAM, CORONARY ARTERY;  Surgeon: Keo Jenkins MD;  Location: [facility] CATH LAB/EP;  Service: Cardiology;  Laterality: Left;    CORONARY ANGIOGRAPHY N/A 4/10/2023    Procedure: ANGIOGRAM, CORONARY ARTERY;  Surgeon: Keo Jenkins MD;  Location: [facility] CATH LAB/EP;  Service: Cardiology;  Laterality: N/A;    CORONARY ANGIOPLASTY WITH STENT PLACEMENT  03/29/2019    mid and distal RCA    ESOPHAGOGASTRODUODENOSCOPY N/A 1/6/2022    Procedure: EGD (ESOPHAGOGASTRODUODENOSCOPY);  Surgeon: Kathe Penaloza MD;  Location: [facility] ENDO (2ND FLR);  Service: Endoscopy;  Laterality: N/A;    EYE SURGERY      INSTANTANEOUS WAVE-FREE RATIO (IFR) N/A 4/25/2023    Procedure: Instantaneous Wave-Free Ratio (IFR);  Surgeon: Keo Jenkins MD;  Location: [facility] CATH LAB/EP;  Service: Cardiology;  Laterality: N/A;    INTRAVASCULAR ULTRASOUND, NON-CORONARY  8/12/2022    Procedure: Intravascular Ultrasound, Non-Coronary;  Surgeon: Keo Jenkins MD;  Location: [facility] CATH LAB/EP;  Service: Cardiology;;    IVUS, CORONARY  4/25/2023    Procedure: IVUS, Coronary;  Surgeon: Keo Jenkins MD;  Location: [facility] CATH LAB/EP;  Service: Cardiology;;    IVUS, CORONARY  5/16/2023    Procedure: IVUS, Coronary;  Surgeon: Keo Jenkins MD;  Location: [facility] CATH LAB/EP;  Service: Cardiology;;  CX    LEFT HEART CATHETERIZATION N/A 3/29/2019    Procedure: Left heart  cath;  Surgeon: Keo Jenkins MD;  Location: [facility] CATH LAB/EP;  Service: Cardiology;  Laterality: N/A;    LEFT HEART CATHETERIZATION Left 10/8/2019    Procedure: Left heart cath;  Surgeon: Keo Jenkins MD;  Location: [facility] CATH LAB/EP;  Service: Cardiology;  Laterality: Left;    LEFT HEART CATHETERIZATION Left 4/10/2023    Procedure: Left heart cath;  Surgeon: Keo Jenkins MD;  Location: [facility] CATH LAB/EP;  Service: Cardiology;  Laterality: Left;    LEFT HEART CATHETERIZATION Left 4/25/2023    Procedure: Left heart cath;  Surgeon: Keo Jenkins MD;  Location: [facility] CATH LAB/EP;  Service: Cardiology;  Laterality: Left;    LEFT HEART CATHETERIZATION Left 5/16/2023    Procedure: Left heart cath;  Surgeon: Keo Jenkins MD;  Location: [facility] CATH LAB/EP;  Service: Cardiology;  Laterality: Left;    PERCUTANEOUS TRANSLUMINAL ANGIOPLASTY (PTA) OF PERIPHERAL VESSEL N/A 7/12/2019    Procedure: PTA, PERIPHERAL VESSEL;  Surgeon: Keo Jenkins MD;  Location: [facility] CATH LAB/EP;  Service: Cardiology;  Laterality: N/A;    PERCUTANEOUS TRANSLUMINAL ANGIOPLASTY (PTA) OF PERIPHERAL VESSEL Left 5/20/2022    Procedure: PTA, PERIPHERAL VESSEL;  Surgeon: Keo Jenkins MD;  Location: [facility] CATH LAB/EP;  Service: Cardiology;  Laterality: Left;    PERCUTANEOUS TRANSLUMINAL ANGIOPLASTY (PTA) OF PERIPHERAL VESSEL Right 8/12/2022    Procedure: PTA, PERIPHERAL VESSEL;  Surgeon: Keo Jenkins MD;  Location: [facility] CATH LAB/EP;  Service: Cardiology;  Laterality: Right;    PERCUTANEOUS TRANSLUMINAL BALLOON ANGIOPLASTY OF CORONARY ARTERY  4/25/2023    Procedure: Angioplasty-coronary;  Surgeon: Keo Jenkins MD;  Location: [facility] CATH LAB/EP;  Service: Cardiology;;    PTCA, SINGLE VESSEL  5/16/2023    Procedure: PTCA, Single Vessel;  Surgeon: Keo Jenkins MD;  Location: [facility] CATH LAB/EP;  Service: Cardiology;;  CX    STENT, DRUG ELUTING, SINGLE VESSEL, CORONARY  4/25/2023    Procedure: Stent, Drug Eluting, Single Vessel, Coronary;   Surgeon: Keo Jenkins MD;  Location: [facility] CATH LAB/EP;  Service: Cardiology;;    STENT, DRUG ELUTING, SINGLE VESSEL, CORONARY  2023    Procedure: Stent, Drug Eluting, Single Vessel, Coronary;  Surgeon: Keo Jenkins MD;  Location: [facility] CATH LAB/EP;  Service: Cardiology;;  CX    TRANSESOPHAGEAL ECHOCARDIOGRAM WITH POSSIBLE CARDIOVERSION (JOSE W/ POSS CARDIOVERSION) N/A 2023    Procedure: Transesophageal echo (JOSE) intra-procedure log documentation;  Surgeon: Keo Jenkins MD;  Location: [facility] CATH LAB/EP;  Service: Cardiology;  Laterality: N/A;     Family History   Problem Relation Age of Onset    Aneurysm Mother     Hypertension Mother     Heart disease Mother     Cancer Father     Diabetes Sister     Hypertension Sister     No Known Problems Brother     No Known Problems Son      Social History     Tobacco Use    Smoking status: Former     Current packs/day: 0.00     Types: Cigarettes     Quit date: 1999     Years since quittin.8    Smokeless tobacco: Never   Substance Use Topics    Alcohol use: No     Alcohol/week: 0.0 standard drinks of alcohol    Drug use: No       OBJECTIVE:     Vital Signs (Most Recent)  Temp: 96.8 °F (36 °C) (24)  Pulse: (!) 58 (24)  Resp: 17 (24)  BP: (!) 178/80 (24)  SpO2: 96 % (24)    Physical Exam:  Physical Exam  Vitals and nursing note reviewed.   Constitutional:       Appearance: Normal appearance.   HENT:      Head: Normocephalic and atraumatic.   Eyes:      General: No scleral icterus.     Extraocular Movements: Extraocular movements intact.   Cardiovascular:      Rate and Rhythm: Bradycardia present.   Pulmonary:      Effort: Pulmonary effort is normal.   Musculoskeletal:         General: Normal range of motion.      Cervical back: Normal range of motion.   Skin:     General: Skin is warm and dry.      Coloration: Skin is not jaundiced.   Neurological:      Mental Status: He is alert and oriented to  person, place, and time.   Psychiatric:         Mood and Affect: Mood normal.         Behavior: Behavior normal.         Thought Content: Thought content normal.         Judgment: Judgment normal.         Laboratory  I have reviewed all pertinent lab results within the past 24 hours.  CBC:   Recent Labs   Lab 02/07/24  0342   WBC 4.83   RBC 2.82*   HGB 8.2*   HCT 23.4*   *   MCV 83   MCH 29.1   MCHC 35.0     CMP:   Recent Labs   Lab 02/07/24  0342   GLU 97   CALCIUM 7.5*   ALBUMIN 2.0*   PROT 5.2*      K 3.3*   CO2 23      BUN 56*   CREATININE 6.8*   ALKPHOS 66   *   AST 63*   BILITOT 0.5     Coagulation:   Recent Labs   Lab 02/01/24  0126   LABPROT 11.4   INR 1.0     ASSESSMENT/PLAN:     Assessment:  63 y.o. male with a PMHx of HTN, Atrial Fibrillation (sees Dr. Calderón, outpatient on plavix and apixaban), COPD (inhaler PRN), BRETT on CKD 4 with proteinuria to start dialysis (serological and immunological workup negative, repeating) scheduled for kidney biopsy 2/6.  Patient brought to IR pre-procedure area on 2/6 for planned image guided renal biopsy with SBP ~200s. Two doses of hydralazine given with improvement in BP, but persistently elevated above . Therefore, renal biopsy will be deferred until BP is under better sustained control.     Case discussed with Dr. Babcock and Dr. Alejandro.    Plan:  When SBP is <160 sustained for 24 hrs (ideally <140), please place IR consult and we can proceed with biopsy.  SBP will need to be <160 for 24 hrs after the procedure as well.  IR discussed with patient increased risk of bleeding if SBP is not at goal.  Pt demonstrated understanding.  Pt will need to be NPO after midnight once SBP goal achieved.  Pt off apixaban since 1/28 and off plavix since 2/1.  Pt to remain off apixaban and plavix until after the biopsy is completed.  Will place order for updated INR level.    Shelby Nunez PA-C  Interventional Radiology

## 2024-02-07 NOTE — ASSESSMENT & PLAN NOTE
Plan:  - Continue home Carvedilol and Imdur  - Continue Eplerenone equivalent, Spironolactone  - Hydralazine TID

## 2024-02-08 LAB
ALBUMIN SERPL BCP-MCNC: 2.1 G/DL (ref 3.5–5.2)
ALP SERPL-CCNC: 66 U/L (ref 55–135)
ALT SERPL W/O P-5'-P-CCNC: 123 U/L (ref 10–44)
ANION GAP SERPL CALC-SCNC: 10 MMOL/L (ref 8–16)
AST SERPL-CCNC: 52 U/L (ref 10–40)
BASOPHILS # BLD AUTO: 0.02 K/UL (ref 0–0.2)
BASOPHILS NFR BLD: 0.4 % (ref 0–1.9)
BILIRUB SERPL-MCNC: 0.6 MG/DL (ref 0.1–1)
BUN SERPL-MCNC: 59 MG/DL (ref 8–23)
CALCIUM SERPL-MCNC: 7.5 MG/DL (ref 8.7–10.5)
CHLORIDE SERPL-SCNC: 104 MMOL/L (ref 95–110)
CO2 SERPL-SCNC: 23 MMOL/L (ref 23–29)
CREAT SERPL-MCNC: 6.6 MG/DL (ref 0.5–1.4)
DIFFERENTIAL METHOD BLD: ABNORMAL
EOSINOPHIL # BLD AUTO: 0.2 K/UL (ref 0–0.5)
EOSINOPHIL NFR BLD: 3.4 % (ref 0–8)
ERYTHROCYTE [DISTWIDTH] IN BLOOD BY AUTOMATED COUNT: 13.2 % (ref 11.5–14.5)
EST. GFR  (NO RACE VARIABLE): 9 ML/MIN/1.73 M^2
GLUCOSE SERPL-MCNC: 100 MG/DL (ref 70–110)
HCT VFR BLD AUTO: 24.2 % (ref 40–54)
HGB BLD-MCNC: 8.3 G/DL (ref 14–18)
IMM GRANULOCYTES # BLD AUTO: 0.03 K/UL (ref 0–0.04)
IMM GRANULOCYTES NFR BLD AUTO: 0.6 % (ref 0–0.5)
INR PPP: 1.1 (ref 0.8–1.2)
LYMPHOCYTES # BLD AUTO: 1.1 K/UL (ref 1–4.8)
LYMPHOCYTES NFR BLD: 20.5 % (ref 18–48)
MAGNESIUM SERPL-MCNC: 3.2 MG/DL (ref 1.6–2.6)
MCH RBC QN AUTO: 28.6 PG (ref 27–31)
MCHC RBC AUTO-ENTMCNC: 34.3 G/DL (ref 32–36)
MCV RBC AUTO: 83 FL (ref 82–98)
MONOCYTES # BLD AUTO: 0.6 K/UL (ref 0.3–1)
MONOCYTES NFR BLD: 10.5 % (ref 4–15)
NEUTROPHILS # BLD AUTO: 3.4 K/UL (ref 1.8–7.7)
NEUTROPHILS NFR BLD: 64.6 % (ref 38–73)
NRBC BLD-RTO: 0 /100 WBC
PHOSPHATE SERPL-MCNC: 4.6 MG/DL (ref 2.7–4.5)
PLATELET # BLD AUTO: 123 K/UL (ref 150–450)
PMV BLD AUTO: 11.3 FL (ref 9.2–12.9)
POTASSIUM SERPL-SCNC: 3.8 MMOL/L (ref 3.5–5.1)
PROT SERPL-MCNC: 5.3 G/DL (ref 6–8.4)
PROTHROMBIN TIME: 11.7 SEC (ref 9–12.5)
RBC # BLD AUTO: 2.9 M/UL (ref 4.6–6.2)
SODIUM SERPL-SCNC: 137 MMOL/L (ref 136–145)
WBC # BLD AUTO: 5.26 K/UL (ref 3.9–12.7)

## 2024-02-08 PROCEDURE — 36415 COLL VENOUS BLD VENIPUNCTURE: CPT | Performed by: PHYSICIAN ASSISTANT

## 2024-02-08 PROCEDURE — 84100 ASSAY OF PHOSPHORUS: CPT

## 2024-02-08 PROCEDURE — 94640 AIRWAY INHALATION TREATMENT: CPT

## 2024-02-08 PROCEDURE — 83735 ASSAY OF MAGNESIUM: CPT

## 2024-02-08 PROCEDURE — 25000003 PHARM REV CODE 250

## 2024-02-08 PROCEDURE — 87340 HEPATITIS B SURFACE AG IA: CPT | Performed by: INTERNAL MEDICINE

## 2024-02-08 PROCEDURE — 99900035 HC TECH TIME PER 15 MIN (STAT)

## 2024-02-08 PROCEDURE — 94761 N-INVAS EAR/PLS OXIMETRY MLT: CPT

## 2024-02-08 PROCEDURE — 11000001 HC ACUTE MED/SURG PRIVATE ROOM

## 2024-02-08 PROCEDURE — 85025 COMPLETE CBC W/AUTO DIFF WBC: CPT

## 2024-02-08 PROCEDURE — 85610 PROTHROMBIN TIME: CPT | Performed by: PHYSICIAN ASSISTANT

## 2024-02-08 PROCEDURE — 90935 HEMODIALYSIS ONE EVALUATION: CPT

## 2024-02-08 PROCEDURE — 63600175 PHARM REV CODE 636 W HCPCS

## 2024-02-08 PROCEDURE — 80053 COMPREHEN METABOLIC PANEL: CPT

## 2024-02-08 PROCEDURE — 25000003 PHARM REV CODE 250: Performed by: INTERNAL MEDICINE

## 2024-02-08 PROCEDURE — 25000003 PHARM REV CODE 250: Performed by: STUDENT IN AN ORGANIZED HEALTH CARE EDUCATION/TRAINING PROGRAM

## 2024-02-08 PROCEDURE — 86706 HEP B SURFACE ANTIBODY: CPT | Performed by: INTERNAL MEDICINE

## 2024-02-08 PROCEDURE — 25000242 PHARM REV CODE 250 ALT 637 W/ HCPCS

## 2024-02-08 RX ORDER — AMLODIPINE BESYLATE 5 MG/1
5 TABLET ORAL DAILY
Status: DISCONTINUED | OUTPATIENT
Start: 2024-02-08 | End: 2024-02-09 | Stop reason: HOSPADM

## 2024-02-08 RX ORDER — ISOSORBIDE MONONITRATE 30 MG/1
30 TABLET, EXTENDED RELEASE ORAL DAILY
Status: DISCONTINUED | OUTPATIENT
Start: 2024-02-09 | End: 2024-02-09 | Stop reason: HOSPADM

## 2024-02-08 RX ORDER — HYDRALAZINE HYDROCHLORIDE 20 MG/ML
5 INJECTION INTRAMUSCULAR; INTRAVENOUS EVERY 6 HOURS PRN
Status: DISCONTINUED | OUTPATIENT
Start: 2024-02-08 | End: 2024-02-09 | Stop reason: HOSPADM

## 2024-02-08 RX ORDER — SODIUM CHLORIDE 9 MG/ML
INJECTION, SOLUTION INTRAVENOUS ONCE
Status: DISCONTINUED | OUTPATIENT
Start: 2024-02-08 | End: 2024-02-09 | Stop reason: HOSPADM

## 2024-02-08 RX ORDER — SODIUM CHLORIDE 9 MG/ML
INJECTION, SOLUTION INTRAVENOUS
Status: DISCONTINUED | OUTPATIENT
Start: 2024-02-08 | End: 2024-02-09 | Stop reason: HOSPADM

## 2024-02-08 RX ADMIN — IPRATROPIUM BROMIDE AND ALBUTEROL SULFATE 3 ML: .5; 3 SOLUTION RESPIRATORY (INHALATION) at 07:02

## 2024-02-08 RX ADMIN — LEVOTHYROXINE SODIUM 75 MCG: 75 TABLET ORAL at 05:02

## 2024-02-08 RX ADMIN — IPRATROPIUM BROMIDE AND ALBUTEROL SULFATE 3 ML: .5; 3 SOLUTION RESPIRATORY (INHALATION) at 04:02

## 2024-02-08 RX ADMIN — SPIRONOLACTONE 25 MG: 25 TABLET, FILM COATED ORAL at 08:02

## 2024-02-08 RX ADMIN — HYDRALAZINE HYDROCHLORIDE 50 MG: 25 TABLET, FILM COATED ORAL at 09:02

## 2024-02-08 RX ADMIN — IPRATROPIUM BROMIDE AND ALBUTEROL SULFATE 3 ML: .5; 3 SOLUTION RESPIRATORY (INHALATION) at 11:02

## 2024-02-08 RX ADMIN — HYDRALAZINE HYDROCHLORIDE 5 MG: 20 INJECTION, SOLUTION INTRAMUSCULAR; INTRAVENOUS at 09:02

## 2024-02-08 RX ADMIN — HYDRALAZINE HYDROCHLORIDE 50 MG: 25 TABLET, FILM COATED ORAL at 05:02

## 2024-02-08 RX ADMIN — AMLODIPINE BESYLATE 5 MG: 5 TABLET ORAL at 07:02

## 2024-02-08 RX ADMIN — Medication 9 MG: at 09:02

## 2024-02-08 RX ADMIN — ISOSORBIDE MONONITRATE 90 MG: 30 TABLET, EXTENDED RELEASE ORAL at 08:02

## 2024-02-08 RX ADMIN — CARVEDILOL 25 MG: 25 TABLET, FILM COATED ORAL at 07:02

## 2024-02-08 RX ADMIN — IPRATROPIUM BROMIDE AND ALBUTEROL SULFATE 3 ML: .5; 3 SOLUTION RESPIRATORY (INHALATION) at 12:02

## 2024-02-08 RX ADMIN — HYDRALAZINE HYDROCHLORIDE 50 MG: 25 TABLET, FILM COATED ORAL at 01:02

## 2024-02-08 RX ADMIN — SODIUM BICARBONATE 650 MG TABLET 1300 MG: at 08:02

## 2024-02-08 RX ADMIN — CARVEDILOL 25 MG: 25 TABLET, FILM COATED ORAL at 08:02

## 2024-02-08 NOTE — CARE UPDATE
Reviewed BP over past 24 hrs with Dr. Maradiaga.  BP not at goal for 24 hrs.  Notified primary team when SBP is <160 sustained for 24 hrs (ideally <140), please place IR consult and we can proceed with biopsy.  SBP will need to be <160 for 24 hrs after the procedure as well.  IR discussed with patient increased risk of bleeding if SBP is not at goal.    Recommended cardene drip.  This would require ICU bed per primary team.  Primary team will discuss with Nephrology (inpatient vs outpatient biopsy and recs for BP management).    Shelby Nunez PA-C  Interventional Radiology

## 2024-02-08 NOTE — ASSESSMENT & PLAN NOTE
Patient with documented diagnosis of Atrial Fibrillation per notes by Cardiologist Dmitriy Chavarria MD reports being compliant with the following home regimen: Apixaban 5mg BID, Amiodarone. EKG taken during admission reveals Aflutter/A Fib    Plan:  - [id number] Score: 1  - Will hold Amiodarone in setting of transaminitis.

## 2024-02-08 NOTE — PROGRESS NOTES
"[facility] Medicine  Progress Note    Patient Name: Domingo Gross  MRN: [id number]  Patient Class: IP- Inpatient   Admission Date: 1/27/2024  Length of Stay: 12 days  Attending Physician: Garo Chase III, MD  Primary Care Provider: Analy Encarnacion MD        Subjective:     Principal Problem:BRETT (acute kidney injury)        HPI:  63-year-old man with PMHx of HTN, Atrial Fibrillation (sees Dr. Calderón, on Amiodarone, Apixaban), COPD (inhaler PRN), CKD (sees Dr. Payne), Hypothyroidism (Levothyroxine) presents via ambulance to [facility] on 1/27/24 for 1-day history of chest pain, cough, irregular breathing. History was collected from patient and wife, who is at bedside.    Patient reports experiencing chest pain, cough, and decreased appetite that started last night. Reports a fever of 102, did not take Tylenol. This morning, wife noticed that he was breathing "funny" and called the ambulance. Patient's boss recently tested positive for Flu/COVID. Patient emphasized that lack of food intake was due to decreased appetite and denied emesis and nausea.    Currently patient denies any chest pain or difficulty breathing. He reports taking potassium at home & being compliant with all medications. He is a former smoker and denies drinking alcohol.    In the ER, vitals were as follows - Temp 99.8 -> 102.3, HR 68, /87, O2 94%. Labs were significant for K 2.4, Magnesium 1.3, BUN/Cr 21/3.2 (baseline 2.8), AST//302, Alk phos 51 WBC 6.15, Procal 0.28. Patient was given 40mEq of potassium PO, 10mEq of KCl and admitted to [facility] for management/further workup of of Hypokalemia, BRETT, PNA, Transaminitis.    Overview/Hospital Course:  No notes on file    Interval History: No adverse events overnight. BP trending in the right direction. Patient asymptomatic.    Review of Systems   Constitutional:  Negative for appetite change, chills and fever.   HENT:  Negative for rhinorrhea, sore " throat and trouble swallowing.    Eyes:  Negative for pain and visual disturbance.   Respiratory:  Negative for cough and shortness of breath.    Cardiovascular:  Negative for chest pain, palpitations and leg swelling.   Gastrointestinal:  Negative for abdominal pain, constipation, diarrhea, nausea and vomiting.   Genitourinary:  Negative for dysuria and hematuria.   Musculoskeletal:  Negative for gait problem and neck stiffness.   Skin:  Negative for rash and wound.   Neurological:  Negative for tremors, weakness and headaches.   Psychiatric/Behavioral:  Negative for agitation and confusion.      Objective:     Vital Signs (Most Recent):  Temp: 97.9 °F (36.6 °C) (02/08/24 1151)  Pulse: (!) 55 (02/08/24 1151)  Resp: 18 (02/08/24 1151)  BP: (!) 142/64 (02/08/24 1151)  SpO2: 97 % (02/08/24 1151) Vital Signs (24h Range):  Temp:  [97.6 °F (36.4 °C)-98.4 °F (36.9 °C)] 97.9 °F (36.6 °C)  Pulse:  [50-74] 55  Resp:  [14-20] 18  SpO2:  [94 %-100 %] 97 %  BP: (135-177)/(63-79) 142/64     Weight: 75.1 kg (165 lb 9.1 oz)  Body mass index is 23.76 kg/m².    Intake/Output Summary (Last 24 hours) at 2/8/2024 1153  Last data filed at 2/8/2024 0922  Gross per 24 hour   Intake --   Output 750 ml   Net -750 ml         Physical Exam  Vitals and nursing note reviewed.   Constitutional:       General: He is not in acute distress.     Appearance: Normal appearance. He is normal weight. He is not ill-appearing or toxic-appearing.   HENT:      Head: Normocephalic.      Right Ear: External ear normal.      Left Ear: External ear normal.      Nose: Nose normal.      Mouth/Throat:      Pharynx: Oropharynx is clear.   Eyes:      Extraocular Movements: Extraocular movements intact.   Neck:      Comments: RIJ  Cardiovascular:      Rate and Rhythm: Normal rate and regular rhythm.      Pulses: Normal pulses.   Pulmonary:      Effort: Pulmonary effort is normal. No respiratory distress.      Breath sounds: No wheezing, rhonchi or rales.   Abdominal:       General: Abdomen is flat. There is no distension.      Palpations: Abdomen is soft.      Tenderness: There is no abdominal tenderness. There is no guarding or rebound.   Musculoskeletal:         General: Normal range of motion.      Cervical back: Normal range of motion.      Right lower leg: No edema.      Left lower leg: No edema.   Skin:     General: Skin is warm.      Coloration: Skin is not jaundiced.   Neurological:      General: No focal deficit present.      Mental Status: He is alert and oriented to person, place, and time. Mental status is at baseline.      Motor: No weakness.   Psychiatric:         Mood and Affect: Mood normal.         Behavior: Behavior normal.         Thought Content: Thought content normal.             Significant Labs: All pertinent labs within the past 24 hours have been reviewed.  CBC:   Recent Labs   Lab 02/07/24  0342 02/08/24 0419   WBC 4.83 5.26   HGB 8.2* 8.3*   HCT 23.4* 24.2*   * 123*     CMP:   Recent Labs   Lab 02/07/24  0342 02/08/24 0419    137   K 3.3* 3.8    104   CO2 23 23   GLU 97 100   BUN 56* 59*   CREATININE 6.8* 6.6*   CALCIUM 7.5* 7.5*   PROT 5.2* 5.3*   ALBUMIN 2.0* 2.1*   BILITOT 0.5 0.6   ALKPHOS 66 66   AST 63* 52*   * 123*   ANIONGAP 9 10       Significant Imaging: I have reviewed all pertinent imaging results/findings within the past 24 hours.    Assessment/Plan:      * BRETT (acute kidney injury)  W/ Baseline CKD  Patient with progressively changing mental status, BUN/Cr worsening   Asterixis noted on exam.    Plan:  - Nephrology consulted, appreciate recommendations.  - Urine Studies suggesting intrinsic etiology (FeNa 3.9%)  - Strict I&O  - Avoid nephrotoxins and renally dose meds  - Plan for renal biopsy Fri 2/9 AM pending SBP < 160 until then.  - IR consulted, appreciate recommendations.   - Anesthesia consulted for IJ line placement.  Placed 2/3, replaced 2/4.  - Plan for tunneled cath placement 2/9 AM per Nephrology  recommendations for continued HD.  - General Surgery consulted, appreciate recommendations.    Uremia  Plan in BRETT    Hyponatremia  RESOLVED      CMV (cytomegalovirus infection)  Plan:  - Quantitative CMV DNA negative.  - Not likely acute infection.    EBV infection  EBV IgM and DNA are negative  these serologies likely represent previous infection    Plan:  - ID consulted, appreciate recommendations.      CAP (community acquired pneumonia)  RESOLVED.    Acute liver failure without hepatic coma  IMPROVING    Plan:  Will hold PRN Tylenol  Abdominal ultrasound with no acute abnormalities  Hepatitis panel negative  Additional work-up labs pending.    Hypothyroidism  Patient with reported TSH 5 months ago 13.669    Plan:  Repeat TSH elevated, T4 normal  Increased home levothyroxine     Persistent atrial fibrillation  Patient with documented diagnosis of Atrial Fibrillation per notes by Cardiologist Dmitriy Chavarria MD reports being compliant with the following home regimen: Apixaban 5mg BID, Amiodarone. EKG taken during admission reveals Aflutter/A Fib    Plan:  - [id number] Score: 1  - Will hold Amiodarone in setting of transaminitis.    Hyperlipidemia  Patient with known history of HLD reports being compliant with the following home regimen: Rosuvastatin 40mg QD    Plan:   - Hold statin in setting of elevated transaminitis  - Continue home equivalent Atorvastatin 80 mg QD when appropriate    HTN (hypertension)  Plan:  - Continue home Carvedilol and Imdur  - Continue Eplerenone equivalent, Spironolactone  - Hydralazine TID      VTE Risk Mitigation (From admission, onward)           Ordered     IP VTE HIGH RISK PATIENT  Once         01/27/24 1240     Place sequential compression device  Until discontinued         01/27/24 1240                    Discharge Planning   FLACO:      Code Status: Full Code   Is the patient medically ready for discharge?:     Reason for patient still in hospital (select all that apply): Patient  trending condition, Laboratory test, Treatment, Consult recommendations, and PT / OT recommendations  Discharge Plan A: Home, Home with family   Discharge Delays: None known at this time      ________________________  Jude Sanford MD  [facility] PGY-2

## 2024-02-08 NOTE — SUBJECTIVE & OBJECTIVE
Interval History: No adverse events overnight. BP trending in the right direction. Patient asymptomatic.    Review of Systems   Constitutional:  Negative for appetite change, chills and fever.   HENT:  Negative for rhinorrhea, sore throat and trouble swallowing.    Eyes:  Negative for pain and visual disturbance.   Respiratory:  Negative for cough and shortness of breath.    Cardiovascular:  Negative for chest pain, palpitations and leg swelling.   Gastrointestinal:  Negative for abdominal pain, constipation, diarrhea, nausea and vomiting.   Genitourinary:  Negative for dysuria and hematuria.   Musculoskeletal:  Negative for gait problem and neck stiffness.   Skin:  Negative for rash and wound.   Neurological:  Negative for tremors, weakness and headaches.   Psychiatric/Behavioral:  Negative for agitation and confusion.      Objective:     Vital Signs (Most Recent):  Temp: 97.9 °F (36.6 °C) (02/08/24 1151)  Pulse: (!) 55 (02/08/24 1151)  Resp: 18 (02/08/24 1151)  BP: (!) 142/64 (02/08/24 1151)  SpO2: 97 % (02/08/24 1151) Vital Signs (24h Range):  Temp:  [97.6 °F (36.4 °C)-98.4 °F (36.9 °C)] 97.9 °F (36.6 °C)  Pulse:  [50-74] 55  Resp:  [14-20] 18  SpO2:  [94 %-100 %] 97 %  BP: (135-177)/(63-79) 142/64     Weight: 75.1 kg (165 lb 9.1 oz)  Body mass index is 23.76 kg/m².    Intake/Output Summary (Last 24 hours) at 2/8/2024 1153  Last data filed at 2/8/2024 0922  Gross per 24 hour   Intake --   Output 750 ml   Net -750 ml         Physical Exam  Vitals and nursing note reviewed.   Constitutional:       General: He is not in acute distress.     Appearance: Normal appearance. He is normal weight. He is not ill-appearing or toxic-appearing.   HENT:      Head: Normocephalic.      Right Ear: External ear normal.      Left Ear: External ear normal.      Nose: Nose normal.      Mouth/Throat:      Pharynx: Oropharynx is clear.   Eyes:      Extraocular Movements: Extraocular movements intact.   Neck:      Comments:  [facility]  Cardiovascular:      Rate and Rhythm: Normal rate and regular rhythm.      Pulses: Normal pulses.   Pulmonary:      Effort: Pulmonary effort is normal. No respiratory distress.      Breath sounds: No wheezing, rhonchi or rales.   Abdominal:      General: Abdomen is flat. There is no distension.      Palpations: Abdomen is soft.      Tenderness: There is no abdominal tenderness. There is no guarding or rebound.   Musculoskeletal:         General: Normal range of motion.      Cervical back: Normal range of motion.      Right lower leg: No edema.      Left lower leg: No edema.   Skin:     General: Skin is warm.      Coloration: Skin is not jaundiced.   Neurological:      General: No focal deficit present.      Mental Status: He is alert and oriented to person, place, and time. Mental status is at baseline.      Motor: No weakness.   Psychiatric:         Mood and Affect: Mood normal.         Behavior: Behavior normal.         Thought Content: Thought content normal.             Significant Labs: All pertinent labs within the past 24 hours have been reviewed.  CBC:   Recent Labs   Lab 02/07/24  0342 02/08/24  0419   WBC 4.83 5.26   HGB 8.2* 8.3*   HCT 23.4* 24.2*   * 123*     CMP:   Recent Labs   Lab 02/07/24  0342 02/08/24  0419    137   K 3.3* 3.8    104   CO2 23 23   GLU 97 100   BUN 56* 59*   CREATININE 6.8* 6.6*   CALCIUM 7.5* 7.5*   PROT 5.2* 5.3*   ALBUMIN 2.0* 2.1*   BILITOT 0.5 0.6   ALKPHOS 66 66   AST 63* 52*   * 123*   ANIONGAP 9 10       Significant Imaging: I have reviewed all pertinent imaging results/findings within the past 24 hours.

## 2024-02-08 NOTE — PLAN OF CARE
Problem: Adult Inpatient Plan of Care  Goal: Plan of Care Review  Outcome: Ongoing, Progressing  Goal: Patient-Specific Goal (Individualized)  Outcome: Ongoing, Progressing  Goal: Absence of Hospital-Acquired Illness or Injury  Outcome: Ongoing, Progressing  Goal: Optimal Comfort and Wellbeing  Outcome: Ongoing, Progressing  Goal: Readiness for Transition of Care  Outcome: Ongoing, Progressing     Problem: Fluid and Electrolyte Imbalance (Acute Kidney Injury/Impairment)  Goal: Fluid and Electrolyte Balance  Outcome: Ongoing, Progressing     Problem: Oral Intake Inadequate (Acute Kidney Injury/Impairment)  Goal: Optimal Nutrition Intake  Outcome: Ongoing, Progressing     Problem: Renal Function Impairment (Acute Kidney Injury/Impairment)  Goal: Effective Renal Function  Outcome: Ongoing, Progressing     Problem: Fluid Imbalance (Pneumonia)  Goal: Fluid Balance  Outcome: Ongoing, Progressing     Problem: Infection (Pneumonia)  Goal: Resolution of Infection Signs and Symptoms  Outcome: Ongoing, Progressing     Problem: Respiratory Compromise (Pneumonia)  Goal: Effective Oxygenation and Ventilation  Outcome: Ongoing, Progressing     Problem: Electrolyte Imbalance  Goal: Electrolyte Balance  Outcome: Ongoing, Progressing     Problem: COPD (Chronic Obstructive Pulmonary Disease) Comorbidity  Goal: Maintenance of COPD Symptom Control  Outcome: Ongoing, Progressing     Problem: Heart Failure Comorbidity  Goal: Maintenance of Heart Failure Symptom Control  Outcome: Ongoing, Progressing     Problem: Hypertension Comorbidity  Goal: Blood Pressure in Desired Range  Outcome: Ongoing, Progressing     Problem: Fall Injury Risk  Goal: Absence of Fall and Fall-Related Injury  Outcome: Ongoing, Progressing     Problem: Skin Injury Risk Increased  Goal: Skin Health and Integrity  Outcome: Ongoing, Progressing     Problem: Activity Intolerance (Pulmonary Impairment)  Goal: Improved Activity Tolerance  Outcome: Ongoing, Progressing      Problem: Airway Clearance Ineffective (Pulmonary Impairment)  Goal: Effective Airway Clearance  Outcome: Ongoing, Progressing     Problem: Gas Exchange Impaired (Pulmonary Impairment)  Goal: Optimal Gas Exchange  Outcome: Ongoing, Progressing     Problem: Pain Chronic (Persistent) (Comorbidity Management)  Goal: Acceptable Pain Control and Functional Ability  Outcome: Ongoing, Progressing     Problem: Infection  Goal: Absence of Infection Signs and Symptoms  Outcome: Ongoing, Progressing     Problem: Device-Related Complication Risk (Hemodialysis)  Goal: Safe, Effective Therapy Delivery  Outcome: Ongoing, Progressing     Problem: Hemodynamic Instability (Hemodialysis)  Goal: Effective Tissue Perfusion  Outcome: Ongoing, Progressing     Problem: Infection (Hemodialysis)  Goal: Absence of Infection Signs and Symptoms  Outcome: Ongoing, Progressing

## 2024-02-08 NOTE — PLAN OF CARE
Chart reviewed/case discussed in am MDR   CM met with pt Wed  - advised him that he has tentatively been set up with OP HD - pt was given a Welcome Letter   Original located next to this CM's desk.   T Th Sa  11:45 am  [facility] - 1st session 2/8 -- start postponed as pt remains hospitalized    Per Jenni with [facility] -- the only available slot at [facility] is T Th Sa 2nd shift - start time 11:30 - Jenni confirmed receipt of requested lab value.      [facility]  -- P  ; f     REF # [id number]   Per therapy - no therapy indicated / no dme recc       wife Karmen  [phone]     dx:  Viral Syn, BRETT, COPD, fever  acute liver injury and acute on chronic kidney injury     Nirali asked to assist with f/u pcp apt (early T TH or M W F) at [facility].    Future Appointments   Date Time Provider Department Center   2/29/2024  7:15 AM LAB[facility] LAB [place]   3/5/2024  2:40 PM Analy Encarnacion MD [facility] IM [place]   3/7/2024 11:00 AM Loly Payne MD [facility]   3/13/2024  9:30 AM APPOINTMENT [facility]PREETI [facility] LAB [facility]   3/13/2024 10:00 AM Robby Reddy MD [facility] HEM ONC [facility]      02/08/24 1646   Post-Acute Status   Post-Acute Authorization Dialysis   Diaylsis Status Set-up Complete/Auth obtained   Discharge Delays   (PENDING MEDICAL STABILITY)   Discharge Plan   Discharge Plan A Home;Home with family

## 2024-02-08 NOTE — NURSING
Patient leaving floor for dialysis at this time.  Patient a,a,ox4, VSS, and has no complaints at this time.  Will continue to monitor.

## 2024-02-08 NOTE — PLAN OF CARE
Problem: Adult Inpatient Plan of Care  Goal: Plan of Care Review  Outcome: Ongoing, Progressing     Problem: COPD (Chronic Obstructive Pulmonary Disease) Comorbidity  Goal: Maintenance of COPD Symptom Control  Outcome: Ongoing, Progressing     Problem: Hypertension Comorbidity  Goal: Blood Pressure in Desired Range  Outcome: Ongoing, Progressing     Problem: Pain Chronic (Persistent) (Comorbidity Management)  Goal: Acceptable Pain Control and Functional Ability  Outcome: Ongoing, Progressing

## 2024-02-08 NOTE — PROGRESS NOTES
Progress Note  Nephrology      Consult Requested By: Garo Chase III, MD      SUBJECTIVE:     Overnight events  Patient is a 63 y.o. male     Patient Active Problem List   Diagnosis    HTN (hypertension)    Claudication in peripheral vascular disease    DJD (degenerative joint disease), lumbar    DDD (degenerative disc disease)    Hyperlipidemia    Atherosclerosis of native artery of both lower extremities with intermittent claudication    Venous insufficiency of both lower extremities    Abnormal cardiovascular stress test    Coronary artery disease involving native coronary artery of native heart with angina pectoris with documented spasm    Bilateral carotid artery stenosis    Cardiac arrest    BRETT (acute kidney injury)    Nephrotic range proteinuria    Nuclear sclerosis, bilateral    CKD (chronic kidney disease) stage 4, GFR 15-29 ml/min    COPD (chronic obstructive pulmonary disease)    Anemia due to stage 4 chronic kidney disease    Persistent atrial fibrillation    Hypothyroidism    Unstable angina    Paroxysmal atrial fibrillation    Acute liver failure without hepatic coma    CAP (community acquired pneumonia)    EBV infection    CMV (cytomegalovirus infection)    Hyponatremia    Uremia     Past Medical History:   Diagnosis Date    Allergy     Anemia     Anticoagulant long-term use     Arthritis     CKD (chronic kidney disease) stage 4, GFR 15-29 ml/min     COPD (chronic obstructive pulmonary disease) 08/20/2021    Coronary artery disease     Heart attack 10/04/2019    Hematuria     Hemothorax     Hyperlipidemia     Hypertension     Hyperuricemia     Hypocalcemia     Hypokalemia     Hypophosphatemia     Hypothyroidism 3/2/2023    PAD (peripheral artery disease)     Proteinuria     Vitamin D deficiency               OBJECTIVE:     Vitals:    02/08/24 0726 02/08/24 0758 02/08/24 0903 02/08/24 0918   BP:  (!) 167/74 (!) 164/71 (!) 154/70   BP Location:       Patient Position:       Pulse: 65 60 (!) 59     Resp: 16 18     Temp:  97.9 °F (36.6 °C)     TempSrc:       SpO2: 95% (!) 94%     Weight:       Height:           Temp: 97.9 °F (36.6 °C) (02/08/24 0758)  Pulse: (!) 59 (02/08/24 0903)  Resp: 18 (02/08/24 0758)  BP: (!) 154/70 (02/08/24 0918)  SpO2: (!) 94 % (02/08/24 0758)    Date 02/08/24 0700 - 02/09/24 0659   Shift 2283-7944 1210-4601 9440-8788 24 Hour Total   INTAKE   Shift Total(mL/kg)       OUTPUT   Urine(mL/kg/hr) 200   200   Shift Total(mL/kg) 200(2.7)   200(2.7)   Weight (kg) 75.1 75.1 75.1 75.1             Medications:   sodium chloride 0.9%   Intravenous Once    albuterol-ipratropium  3 mL Nebulization Q4H    carvediloL  25 mg Oral BID WM    hydrALAZINE  50 mg Oral Q8H    isosorbide mononitrate  30 mg Oral Once    isosorbide mononitrate  90 mg Oral Daily    levothyroxine  75 mcg Oral Before breakfast    sodium bicarbonate  1,300 mg Oral TID    spironolactone  25 mg Oral BID     Physical Exam:  General appearance:NAD   No nausea   No vomiting    No diarrhea  Lungs: diminished breath sounds  Heart: pulse 55  Abdomen: soft   Extremities: edema   Skin: dry   Laboratory:  ABG  Labs reviewed  Recent Results (from the past 336 hour(s))   Basic metabolic panel    Collection Time: 01/29/24 11:37 AM   Result Value Ref Range    Sodium 131 (L) 136 - 145 mmol/L    Potassium 4.6 3.5 - 5.1 mmol/L    Chloride 105 95 - 110 mmol/L    CO2 18 (L) 23 - 29 mmol/L    BUN 35 (H) 8 - 23 mg/dL    Creatinine 4.0 (H) 0.5 - 1.4 mg/dL    Calcium 7.9 (L) 8.7 - 10.5 mg/dL    Anion Gap 8 8 - 16 mmol/L   Basic metabolic panel    Collection Time: 01/28/24 10:42 PM   Result Value Ref Range    Sodium 131 (L) 136 - 145 mmol/L    Potassium 3.5 3.5 - 5.1 mmol/L    Chloride 103 95 - 110 mmol/L    CO2 20 (L) 23 - 29 mmol/L    BUN 32 (H) 8 - 23 mg/dL    Creatinine 3.9 (H) 0.5 - 1.4 mg/dL    Calcium 8.0 (L) 8.7 - 10.5 mg/dL    Anion Gap 8 8 - 16 mmol/L   Basic metabolic panel    Collection Time: 01/28/24  2:32 PM   Result Value Ref Range     "Sodium 135 (L) 136 - 145 mmol/L    Potassium 2.8 (L) 3.5 - 5.1 mmol/L    Chloride 100 95 - 110 mmol/L    CO2 23 23 - 29 mmol/L    BUN 29 (H) 8 - 23 mg/dL    Creatinine 3.8 (H) 0.5 - 1.4 mg/dL    Calcium 7.9 (L) 8.7 - 10.5 mg/dL    Anion Gap 12 8 - 16 mmol/L     Recent Results (from the past 336 hour(s))   CBC with Automated Differential    Collection Time: 02/08/24  4:19 AM   Result Value Ref Range    WBC 5.26 3.90 - 12.70 K/uL    Hemoglobin 8.3 (L) 14.0 - 18.0 g/dL    Hematocrit 24.2 (L) 40.0 - 54.0 %    Platelets 123 (L) 150 - 450 K/uL   CBC with Automated Differential    Collection Time: 02/07/24  3:42 AM   Result Value Ref Range    WBC 4.83 3.90 - 12.70 K/uL    Hemoglobin 8.2 (L) 14.0 - 18.0 g/dL    Hematocrit 23.4 (L) 40.0 - 54.0 %    Platelets 121 (L) 150 - 450 K/uL   CBC with Automated Differential    Collection Time: 02/06/24  4:20 AM   Result Value Ref Range    WBC 4.39 3.90 - 12.70 K/uL    Hemoglobin 8.5 (L) 14.0 - 18.0 g/dL    Hematocrit 24.7 (L) 40.0 - 54.0 %    Platelets 101 (L) 150 - 450 K/uL     Urinalysis  No results for input(s): "COLORU", "CLARITYU", "SPECGRAV", "PHUR", "PROTEINUA", "GLUCOSEU", "BILIRUBINCON", "BLOODU", "WBCU", "RBCU", "BACTERIA", "MUCUS", "NITRITE", "LEUKOCYTESUR", "UROBILINOGEN", "HYALINECASTS" in the last 24 hours.    Diagnostic Results:  X-Ray: Reviewed  US: Reviewed  Echo: Reviewed  ACCESS    ASSESSMENT/PLAN:     BRETT/ CKD 4  US 2020  The right kidney measures 11.8 cm. The left kidney measures 11.7 cm.  There is equivocal mild increased echogenicity of the renal parenchyma bilaterally.  No solid or cystic masses. No hydronephrosis.The bladder is unremarkable in appearance.  Right kidney RI 0.70.  Left kidney RI 0.71.  Splenic RI of 0.58.  UA protein/creatinine 2.09  Creatinine 6.6  GFR 9 cc/min  Azotemia   BUN 59  K 3.8  Metabolic bone disease  Phosphorus 4.6  Anemia secondary to CKD   Hb 8.3  Poor nutrition  Albumin 2.1  Echo -  The left ventricle is normal in size with normal " systolic function.  The estimated ejection fraction is 55%.  Normal right ventricular size with normal right ventricular systolic function.  Severe left atrial enlargement.  Mild mitral regurgitation.  The estimated PA systolic pressure is 38 mmHg.  Normal central venous pressure (3 mmHg).  A diastolic pattern consistent with atrial fibrillation observed.   Hypertension  Blood pressure 157/65  Renal diet   Supplements  Dialysis today  Needs tunneled catheter placement

## 2024-02-08 NOTE — ASSESSMENT & PLAN NOTE
W/ Baseline CKD  Patient with progressively changing mental status, BUN/Cr worsening   Asterixis noted on exam.    Plan:  - Nephrology consulted, appreciate recommendations.  - Urine Studies suggesting intrinsic etiology (FeNa 3.9%)  - Strict I&O  - Avoid nephrotoxins and renally dose meds  - Plan for renal biopsy Fri 2/9 AM pending SBP < 160 until then.  - IR consulted, appreciate recommendations.   - Anesthesia consulted for IJ line placement.  Placed 2/3, replaced 2/4.  - Plan for tunneled cath placement 2/9 AM per Nephrology recommendations for continued HD.  - General Surgery consulted, appreciate recommendations.

## 2024-02-09 ENCOUNTER — ANESTHESIA EVENT (OUTPATIENT)
Dept: SURGERY | Facility: HOSPITAL | Age: 64
DRG: 193 | End: 2024-02-09
Payer: COMMERCIAL

## 2024-02-09 ENCOUNTER — ANESTHESIA (OUTPATIENT)
Dept: SURGERY | Facility: HOSPITAL | Age: 64
DRG: 193 | End: 2024-02-09
Payer: COMMERCIAL

## 2024-02-09 VITALS
OXYGEN SATURATION: 92 % | WEIGHT: 176.13 LBS | HEART RATE: 60 BPM | SYSTOLIC BLOOD PRESSURE: 149 MMHG | RESPIRATION RATE: 18 BRPM | HEIGHT: 70 IN | TEMPERATURE: 98 F | BODY MASS INDEX: 25.21 KG/M2 | DIASTOLIC BLOOD PRESSURE: 87 MMHG

## 2024-02-09 LAB
ALBUMIN SERPL BCP-MCNC: 2 G/DL (ref 3.5–5.2)
ALP SERPL-CCNC: 63 U/L (ref 55–135)
ALT SERPL W/O P-5'-P-CCNC: 99 U/L (ref 10–44)
ANION GAP SERPL CALC-SCNC: 8 MMOL/L (ref 8–16)
AST SERPL-CCNC: 47 U/L (ref 10–40)
BASOPHILS # BLD AUTO: 0.01 K/UL (ref 0–0.2)
BASOPHILS NFR BLD: 0.2 % (ref 0–1.9)
BILIRUB SERPL-MCNC: 0.6 MG/DL (ref 0.1–1)
BUN SERPL-MCNC: 35 MG/DL (ref 8–23)
CALCIUM SERPL-MCNC: 7.5 MG/DL (ref 8.7–10.5)
CHLORIDE SERPL-SCNC: 101 MMOL/L (ref 95–110)
CO2 SERPL-SCNC: 29 MMOL/L (ref 23–29)
CREAT SERPL-MCNC: 4.7 MG/DL (ref 0.5–1.4)
DIFFERENTIAL METHOD BLD: ABNORMAL
EOSINOPHIL # BLD AUTO: 0.1 K/UL (ref 0–0.5)
EOSINOPHIL NFR BLD: 1.9 % (ref 0–8)
ERYTHROCYTE [DISTWIDTH] IN BLOOD BY AUTOMATED COUNT: 13.1 % (ref 11.5–14.5)
EST. GFR  (NO RACE VARIABLE): 13 ML/MIN/1.73 M^2
GLUCOSE SERPL-MCNC: 97 MG/DL (ref 70–110)
HBV SURFACE AB SER QL IA: NEGATIVE
HBV SURFACE AB SERPL IA-ACNC: <3 MIU/ML
HBV SURFACE AG SERPL QL IA: NORMAL
HCT VFR BLD AUTO: 22.7 % (ref 40–54)
HGB BLD-MCNC: 7.7 G/DL (ref 14–18)
IMM GRANULOCYTES # BLD AUTO: 0.03 K/UL (ref 0–0.04)
IMM GRANULOCYTES NFR BLD AUTO: 0.5 % (ref 0–0.5)
LYMPHOCYTES # BLD AUTO: 1.1 K/UL (ref 1–4.8)
LYMPHOCYTES NFR BLD: 17.8 % (ref 18–48)
MAGNESIUM SERPL-MCNC: 2.3 MG/DL (ref 1.6–2.6)
MCH RBC QN AUTO: 28.7 PG (ref 27–31)
MCHC RBC AUTO-ENTMCNC: 33.9 G/DL (ref 32–36)
MCV RBC AUTO: 85 FL (ref 82–98)
MONOCYTES # BLD AUTO: 0.7 K/UL (ref 0.3–1)
MONOCYTES NFR BLD: 11.8 % (ref 4–15)
NEUTROPHILS # BLD AUTO: 4 K/UL (ref 1.8–7.7)
NEUTROPHILS NFR BLD: 67.8 % (ref 38–73)
NRBC BLD-RTO: 0 /100 WBC
PHOSPHATE SERPL-MCNC: 4.1 MG/DL (ref 2.7–4.5)
PLATELET # BLD AUTO: 127 K/UL (ref 150–450)
PMV BLD AUTO: 11.6 FL (ref 9.2–12.9)
POTASSIUM SERPL-SCNC: 4 MMOL/L (ref 3.5–5.1)
PROT SERPL-MCNC: 5.2 G/DL (ref 6–8.4)
RBC # BLD AUTO: 2.68 M/UL (ref 4.6–6.2)
SODIUM SERPL-SCNC: 138 MMOL/L (ref 136–145)
WBC # BLD AUTO: 5.91 K/UL (ref 3.9–12.7)

## 2024-02-09 PROCEDURE — 0JH63XZ INSERTION OF TUNNELED VASCULAR ACCESS DEVICE INTO CHEST SUBCUTANEOUS TISSUE AND FASCIA, PERCUTANEOUS APPROACH: ICD-10-PCS | Performed by: SURGERY

## 2024-02-09 PROCEDURE — 94640 AIRWAY INHALATION TREATMENT: CPT

## 2024-02-09 PROCEDURE — 77001 FLUOROGUIDE FOR VEIN DEVICE: CPT | Mod: 26,,, | Performed by: SURGERY

## 2024-02-09 PROCEDURE — 83735 ASSAY OF MAGNESIUM: CPT

## 2024-02-09 PROCEDURE — D9220A PRA ANESTHESIA: Mod: CRNA,,, | Performed by: NURSE ANESTHETIST, CERTIFIED REGISTERED

## 2024-02-09 PROCEDURE — 63600175 PHARM REV CODE 636 W HCPCS

## 2024-02-09 PROCEDURE — 25000242 PHARM REV CODE 250 ALT 637 W/ HCPCS

## 2024-02-09 PROCEDURE — C1750 CATH, HEMODIALYSIS,LONG-TERM: HCPCS | Performed by: SURGERY

## 2024-02-09 PROCEDURE — 25000003 PHARM REV CODE 250

## 2024-02-09 PROCEDURE — 36415 COLL VENOUS BLD VENIPUNCTURE: CPT

## 2024-02-09 PROCEDURE — 63600175 PHARM REV CODE 636 W HCPCS: Performed by: SURGERY

## 2024-02-09 PROCEDURE — 85025 COMPLETE CBC W/AUTO DIFF WBC: CPT

## 2024-02-09 PROCEDURE — 36000706: Performed by: SURGERY

## 2024-02-09 PROCEDURE — 25000003 PHARM REV CODE 250: Performed by: NURSE ANESTHETIST, CERTIFIED REGISTERED

## 2024-02-09 PROCEDURE — 36558 INSERT TUNNELED CV CATH: CPT | Mod: RT,,, | Performed by: SURGERY

## 2024-02-09 PROCEDURE — 02HV33Z INSERTION OF INFUSION DEVICE INTO SUPERIOR VENA CAVA, PERCUTANEOUS APPROACH: ICD-10-PCS | Performed by: SURGERY

## 2024-02-09 PROCEDURE — 84100 ASSAY OF PHOSPHORUS: CPT

## 2024-02-09 PROCEDURE — 63600175 PHARM REV CODE 636 W HCPCS: Performed by: RADIOLOGY

## 2024-02-09 PROCEDURE — 25000003 PHARM REV CODE 250: Performed by: INTERNAL MEDICINE

## 2024-02-09 PROCEDURE — 36000707: Performed by: SURGERY

## 2024-02-09 PROCEDURE — 90935 HEMODIALYSIS ONE EVALUATION: CPT

## 2024-02-09 PROCEDURE — 25000003 PHARM REV CODE 250: Performed by: RADIOLOGY

## 2024-02-09 PROCEDURE — 71000033 HC RECOVERY, INTIAL HOUR: Performed by: SURGERY

## 2024-02-09 PROCEDURE — 37000008 HC ANESTHESIA 1ST 15 MINUTES: Performed by: SURGERY

## 2024-02-09 PROCEDURE — D9220A PRA ANESTHESIA: Mod: ANES,,, | Performed by: STUDENT IN AN ORGANIZED HEALTH CARE EDUCATION/TRAINING PROGRAM

## 2024-02-09 PROCEDURE — 25000003 PHARM REV CODE 250: Performed by: STUDENT IN AN ORGANIZED HEALTH CARE EDUCATION/TRAINING PROGRAM

## 2024-02-09 PROCEDURE — 25000003 PHARM REV CODE 250: Performed by: SURGERY

## 2024-02-09 PROCEDURE — 80053 COMPREHEN METABOLIC PANEL: CPT

## 2024-02-09 PROCEDURE — 94761 N-INVAS EAR/PLS OXIMETRY MLT: CPT

## 2024-02-09 PROCEDURE — 63600175 PHARM REV CODE 636 W HCPCS: Performed by: NURSE ANESTHETIST, CERTIFIED REGISTERED

## 2024-02-09 PROCEDURE — 37000009 HC ANESTHESIA EA ADD 15 MINS: Performed by: SURGERY

## 2024-02-09 PROCEDURE — 99222 1ST HOSP IP/OBS MODERATE 55: CPT | Mod: ,,, | Performed by: SURGERY

## 2024-02-09 PROCEDURE — 0TB13ZX EXCISION OF LEFT KIDNEY, PERCUTANEOUS APPROACH, DIAGNOSTIC: ICD-10-PCS | Performed by: RADIOLOGY

## 2024-02-09 DEVICE — HEMOSPLIT XK HEMODIALYSIS CATH, ST, 16 FR. 23CM
Type: IMPLANTABLE DEVICE | Site: CHEST | Status: FUNCTIONAL
Brand: HEMOSPLIT XK LONG-TERM HEMODIALYSIS CATHETER

## 2024-02-09 RX ORDER — FENTANYL CITRATE 50 UG/ML
INJECTION, SOLUTION INTRAMUSCULAR; INTRAVENOUS
Status: COMPLETED | OUTPATIENT
Start: 2024-02-09 | End: 2024-02-09

## 2024-02-09 RX ORDER — LIDOCAINE HYDROCHLORIDE 10 MG/ML
INJECTION INFILTRATION; PERINEURAL
Status: DISCONTINUED | OUTPATIENT
Start: 2024-02-09 | End: 2024-02-09 | Stop reason: HOSPADM

## 2024-02-09 RX ORDER — HEPARIN SODIUM 1000 [USP'U]/ML
4100 INJECTION, SOLUTION INTRAVENOUS; SUBCUTANEOUS
Status: DISCONTINUED | OUTPATIENT
Start: 2024-02-09 | End: 2024-02-09 | Stop reason: HOSPADM

## 2024-02-09 RX ORDER — CEFAZOLIN SODIUM 1 G/3ML
INJECTION, POWDER, FOR SOLUTION INTRAMUSCULAR; INTRAVENOUS
Status: DISCONTINUED | OUTPATIENT
Start: 2024-02-09 | End: 2024-02-09

## 2024-02-09 RX ORDER — HYDROXYZINE PAMOATE 25 MG/1
25 CAPSULE ORAL NIGHTLY PRN
Qty: 30 CAPSULE | Refills: 0 | Status: SHIPPED | OUTPATIENT
Start: 2024-02-09 | End: 2024-05-01 | Stop reason: SDUPTHER

## 2024-02-09 RX ORDER — LIDOCAINE HYDROCHLORIDE 20 MG/ML
INJECTION INTRAVENOUS
Status: DISCONTINUED | OUTPATIENT
Start: 2024-02-09 | End: 2024-02-09

## 2024-02-09 RX ORDER — HEPARIN SODIUM 1000 [USP'U]/ML
INJECTION, SOLUTION INTRAVENOUS; SUBCUTANEOUS
Status: DISCONTINUED | OUTPATIENT
Start: 2024-02-09 | End: 2024-02-09 | Stop reason: HOSPADM

## 2024-02-09 RX ORDER — SODIUM CHLORIDE 9 MG/ML
INJECTION, SOLUTION INTRAVENOUS ONCE
Status: COMPLETED | OUTPATIENT
Start: 2024-02-09 | End: 2024-02-09

## 2024-02-09 RX ORDER — BUPIVACAINE HYDROCHLORIDE 2.5 MG/ML
INJECTION, SOLUTION EPIDURAL; INFILTRATION; INTRACAUDAL
Status: DISCONTINUED | OUTPATIENT
Start: 2024-02-09 | End: 2024-02-09 | Stop reason: HOSPADM

## 2024-02-09 RX ORDER — ISOSORBIDE MONONITRATE 30 MG/1
30 TABLET, EXTENDED RELEASE ORAL DAILY
Qty: 30 TABLET | Refills: 11 | Status: SHIPPED | OUTPATIENT
Start: 2024-02-10 | End: 2024-03-01 | Stop reason: SDUPTHER

## 2024-02-09 RX ORDER — PROPOFOL 10 MG/ML
VIAL (ML) INTRAVENOUS
Status: DISCONTINUED | OUTPATIENT
Start: 2024-02-09 | End: 2024-02-09

## 2024-02-09 RX ORDER — PROPOFOL 10 MG/ML
VIAL (ML) INTRAVENOUS CONTINUOUS PRN
Status: DISCONTINUED | OUTPATIENT
Start: 2024-02-09 | End: 2024-02-09

## 2024-02-09 RX ORDER — HYDRALAZINE HYDROCHLORIDE 50 MG/1
50 TABLET, FILM COATED ORAL EVERY 8 HOURS
Qty: 90 TABLET | Refills: 11 | Status: SHIPPED | OUTPATIENT
Start: 2024-02-09 | End: 2024-03-01 | Stop reason: SDUPTHER

## 2024-02-09 RX ORDER — MIDAZOLAM HYDROCHLORIDE 1 MG/ML
INJECTION INTRAMUSCULAR; INTRAVENOUS
Status: COMPLETED | OUTPATIENT
Start: 2024-02-09 | End: 2024-02-09

## 2024-02-09 RX ORDER — PROCHLORPERAZINE EDISYLATE 5 MG/ML
5 INJECTION INTRAMUSCULAR; INTRAVENOUS EVERY 30 MIN PRN
Status: DISCONTINUED | OUTPATIENT
Start: 2024-02-09 | End: 2024-02-09 | Stop reason: HOSPADM

## 2024-02-09 RX ORDER — SODIUM CHLORIDE 9 MG/ML
INJECTION, SOLUTION INTRAVENOUS
Status: DISCONTINUED | OUTPATIENT
Start: 2024-02-09 | End: 2024-02-09 | Stop reason: HOSPADM

## 2024-02-09 RX ORDER — AMLODIPINE BESYLATE 5 MG/1
5 TABLET ORAL DAILY
Qty: 30 TABLET | Refills: 11 | Status: SHIPPED | OUTPATIENT
Start: 2024-02-10 | End: 2024-03-01 | Stop reason: SDUPTHER

## 2024-02-09 RX ORDER — CEFAZOLIN SODIUM 2 G/50ML
2 SOLUTION INTRAVENOUS
Status: DISCONTINUED | OUTPATIENT
Start: 2024-02-09 | End: 2024-02-09 | Stop reason: HOSPADM

## 2024-02-09 RX ORDER — LIDOCAINE HYDROCHLORIDE 10 MG/ML
INJECTION INFILTRATION; PERINEURAL
Status: COMPLETED | OUTPATIENT
Start: 2024-02-09 | End: 2024-02-09

## 2024-02-09 RX ORDER — LIDOCAINE 50 MG/G
1 PATCH TOPICAL DAILY PRN
Qty: 30 PATCH | Refills: 0 | Status: SHIPPED | OUTPATIENT
Start: 2024-02-09

## 2024-02-09 RX ORDER — SODIUM CHLORIDE 9 MG/ML
INJECTION, SOLUTION INTRAVENOUS
Status: COMPLETED | OUTPATIENT
Start: 2024-02-09 | End: 2024-02-09

## 2024-02-09 RX ORDER — LEVOTHYROXINE SODIUM 75 UG/1
75 TABLET ORAL
Qty: 30 TABLET | Refills: 11 | Status: SHIPPED | OUTPATIENT
Start: 2024-02-10 | End: 2025-02-09

## 2024-02-09 RX ORDER — OXYCODONE HYDROCHLORIDE 5 MG/1
5 TABLET ORAL
Status: DISCONTINUED | OUTPATIENT
Start: 2024-02-09 | End: 2024-02-09 | Stop reason: HOSPADM

## 2024-02-09 RX ADMIN — MIDAZOLAM HYDROCHLORIDE 1 MG: 1 INJECTION, SOLUTION INTRAMUSCULAR; INTRAVENOUS at 09:02

## 2024-02-09 RX ADMIN — SODIUM CHLORIDE: 0.9 INJECTION, SOLUTION INTRAVENOUS at 09:02

## 2024-02-09 RX ADMIN — IPRATROPIUM BROMIDE AND ALBUTEROL SULFATE 3 ML: .5; 3 SOLUTION RESPIRATORY (INHALATION) at 07:02

## 2024-02-09 RX ADMIN — GELATIN ABSORBABLE SPONGE SIZE 100 1 APPLICATOR: MISC at 09:02

## 2024-02-09 RX ADMIN — LEVOTHYROXINE SODIUM 75 MCG: 75 TABLET ORAL at 05:02

## 2024-02-09 RX ADMIN — PROPOFOL 100 MCG/KG/MIN: 10 INJECTION, EMULSION INTRAVENOUS at 10:02

## 2024-02-09 RX ADMIN — CARVEDILOL 25 MG: 25 TABLET, FILM COATED ORAL at 05:02

## 2024-02-09 RX ADMIN — PROPOFOL 50 MG: 10 INJECTION, EMULSION INTRAVENOUS at 10:02

## 2024-02-09 RX ADMIN — FENTANYL CITRATE 50 MCG: 50 INJECTION, SOLUTION INTRAMUSCULAR; INTRAVENOUS at 09:02

## 2024-02-09 RX ADMIN — PROPOFOL 20 MG: 10 INJECTION, EMULSION INTRAVENOUS at 10:02

## 2024-02-09 RX ADMIN — PROPOFOL 40 MG: 10 INJECTION, EMULSION INTRAVENOUS at 10:02

## 2024-02-09 RX ADMIN — LIDOCAINE HYDROCHLORIDE 5 ML: 10 INJECTION, SOLUTION INFILTRATION; PERINEURAL at 09:02

## 2024-02-09 RX ADMIN — LIDOCAINE HYDROCHLORIDE 40 MG: 20 INJECTION, SOLUTION INTRAVENOUS at 10:02

## 2024-02-09 RX ADMIN — HYDRALAZINE HYDROCHLORIDE 50 MG: 25 TABLET, FILM COATED ORAL at 05:02

## 2024-02-09 RX ADMIN — CEFAZOLIN 2 G: 330 INJECTION, POWDER, FOR SOLUTION INTRAMUSCULAR; INTRAVENOUS at 10:02

## 2024-02-09 RX ADMIN — SODIUM CHLORIDE: 0.9 INJECTION, SOLUTION INTRAVENOUS at 03:02

## 2024-02-09 RX ADMIN — IPRATROPIUM BROMIDE AND ALBUTEROL SULFATE 3 ML: .5; 3 SOLUTION RESPIRATORY (INHALATION) at 04:02

## 2024-02-09 RX ADMIN — HYDRALAZINE HYDROCHLORIDE 10 MG: 20 INJECTION INTRAMUSCULAR; INTRAVENOUS at 09:02

## 2024-02-09 RX ADMIN — SODIUM CHLORIDE 500 ML: 0.9 INJECTION, SOLUTION INTRAVENOUS at 09:02

## 2024-02-09 NOTE — HOSPITAL COURSE
Patient admitted to [facility] for evaluation and management of elevated AST/ALT, BRETT, and pneumonia.     PNA/infection - Initiated on doxycycline and ceftriaxone for CAP coverage. Transitioned to ceftriaxone and azithromycin for presummed COPD exacerbation coverage. Initiated on Duonebs. Completed 5 days total of abx coverage. Weaned to room air. Maintained O2 sat with no respiratory distress. Blood cultures from 1/27 finalized w/ NGTD. UA unremarkable. Patient did experience cyclic fevers max 103F orally. Resolved w/o tylenol (not given 2/2 to elevated LFTs).    BRETT - BUN/Cr continued to elevate. Nephrology ([facility]) consulted, appreciate recommendations. HD was recommended. Anesthesia consulted for temporary HD line placement. Triple lumen line placed 2/3 and subsequently replaced with trialysis line 2/4. Patient initiated on HD 2/4. HD paused for re-evaluation. Determination made to continue HD. General Surgery consulted for permacath placement. Placed 2/9. IR consulted for kidney biopsy. Patient's home eliquis held prior to procedure. Procedure delayed 2/2 to patient's BP above goal of SBP > 160. BP meds: Hydralazine, imdur, coreg, amlodipine. BP lowered over couple days without symptoms. Biopsy procedure completed 2/9. Patient to start outpatient HD Sat 2/10 and continue on TThSat schedule.    Elevated AST/ALT - Discontinued amiodarone, atorvastatin, and tylenol 2/2 to elevation. PT-INR within normal limits. Work-up overall negative. Acute hepatitis panel negative. Viral studies with signs of possible past CMV, EBV but would not explain current flare. ID consulted, appreciate recommendations. Downtrended with unknown clear etiology of cause. Less likely shock considering patient BP initially and during most of hospitalization remained elevated.    Patient remained hemodynamically and clinically stable. Patient plan discussed with patient. Return precautions discussed with patient. Patient  verbally acknowledged understanding and agreement with plan. Sent medications to patient's pharmacy and made patient aware. Patient encouraged to take medications as prescribed and to follow up closely with Cardiology, Nephrology, and PCP.

## 2024-02-09 NOTE — PROGRESS NOTES
02/09/24 1640   Vital Signs   Temp 97.5 °F (36.4 °C)   Temp Source Temporal   Pulse 60   Heart Rate Source Right;Brachial;NIBP   Resp 18   Device (Oxygen Therapy) room air   BP (!) 149/87   BP Location Right arm   BP Method Automatic   Patient Position Lying   Post-Hemodialysis Assessment   Rinseback Volume (mL) 250 mL   Blood Volume Processed (Liters) 30 L   Dialyzer Clearance Lightly streaked   Duration of Treatment 120 minutes   Additional Fluid Intake (mL) 500 mL   Total UF (mL) 1000 mL   Net Fluid Removal 500   Patient Response to Treatment well   Post-Hemodialysis Comments Pt a+ox3, cardiac rrr, bbs clear, abd soft with + bowel sounds

## 2024-02-09 NOTE — PLAN OF CARE
Recommendation:  1. When diet resumed, pt will start on cardiac renal diet  2. Encourage intake at meals  3. Monitor weight/labs  4. RD to follow to monitor PO intake    Goals:  Pt will be resumed on cardiac renal diet by RD follow-up

## 2024-02-09 NOTE — PLAN OF CARE
Patient has met PACU discharge criteria, VSS, pain well controlled. Family updated by phone. Released from PACU by Anesthesia MD.     Telebox, watch, 2 rings sent with patient

## 2024-02-09 NOTE — ASSESSMENT & PLAN NOTE
W/ Baseline CKD  Patient with progressively changing mental status, BUN/Cr worsening   Asterixis noted on exam.    Plan:  - Nephrology consulted, appreciate recommendations.  - Urine Studies suggesting intrinsic etiology (FeNa 3.9%)  - Strict I&O  - Avoid nephrotoxins and renally dose meds  - Renal biopsy Fri 2/9  - IR consulted, appreciate recommendations.   - Anesthesia consulted for IJ line placement.  Placed 2/3, replaced 2/4.  - Tunneled cath placement 2/9.  - General Surgery consulted, appreciate recommendations.

## 2024-02-09 NOTE — SUBJECTIVE & OBJECTIVE
Interval History: No adverse events overnight. BP < 160 over last 24 hrs.    Review of Systems   Constitutional:  Negative for appetite change, chills and fever.   HENT:  Negative for rhinorrhea, sore throat and trouble swallowing.    Eyes:  Negative for pain and visual disturbance.   Respiratory:  Negative for cough and shortness of breath.    Cardiovascular:  Negative for chest pain, palpitations and leg swelling.   Gastrointestinal:  Negative for abdominal pain, constipation, diarrhea, nausea and vomiting.   Genitourinary:  Negative for dysuria and hematuria.   Musculoskeletal:  Negative for gait problem and neck stiffness.   Skin:  Negative for rash and wound.   Neurological:  Negative for tremors, weakness and headaches.   Psychiatric/Behavioral:  Negative for agitation and confusion.      Objective:     Vital Signs (Most Recent):  Temp: 97.7 °F (36.5 °C) (02/09/24 1050)  Pulse: (!) 56 (02/09/24 1118)  Resp: 12 (02/09/24 1118)  BP: (!) 112/54 (02/09/24 1118)  SpO2: (!) 92 % (02/09/24 1118) Vital Signs (24h Range):  Temp:  [97 °F (36.1 °C)-98.9 °F (37.2 °C)] 97.7 °F (36.5 °C)  Pulse:  [53-78] 56  Resp:  [10-20] 12  SpO2:  [91 %-99 %] 92 %  BP: ()/(50-75) 112/54     Weight: 79.9 kg (176 lb 2.4 oz)  Body mass index is 25.27 kg/m².    Intake/Output Summary (Last 24 hours) at 2/9/2024 1223  Last data filed at 2/9/2024 0046  Gross per 24 hour   Intake 0 ml   Output 2050 ml   Net -2050 ml         Physical Exam  Vitals and nursing note reviewed.   Constitutional:       General: He is not in acute distress.     Appearance: Normal appearance. He is normal weight. He is not ill-appearing or toxic-appearing.   HENT:      Head: Normocephalic.      Right Ear: External ear normal.      Left Ear: External ear normal.      Nose: Nose normal.      Mouth/Throat:      Pharynx: Oropharynx is clear.   Eyes:      Extraocular Movements: Extraocular movements intact.   Neck:      Comments: [facility]  Cardiovascular:      Rate and  Rhythm: Normal rate and regular rhythm.      Pulses: Normal pulses.   Pulmonary:      Effort: Pulmonary effort is normal. No respiratory distress.      Breath sounds: No wheezing, rhonchi or rales.   Abdominal:      General: Abdomen is flat. There is no distension.      Palpations: Abdomen is soft.      Tenderness: There is no abdominal tenderness. There is no guarding or rebound.   Musculoskeletal:         General: Normal range of motion.      Cervical back: Normal range of motion.      Right lower leg: No edema.      Left lower leg: No edema.   Skin:     General: Skin is warm.      Coloration: Skin is not jaundiced.   Neurological:      General: No focal deficit present.      Mental Status: He is alert and oriented to person, place, and time. Mental status is at baseline.      Motor: No weakness.   Psychiatric:         Mood and Affect: Mood normal.         Behavior: Behavior normal.         Thought Content: Thought content normal.             Significant Labs: All pertinent labs within the past 24 hours have been reviewed.  CBC:   Recent Labs   Lab 02/08/24  0419 02/09/24  0236   WBC 5.26 5.91   HGB 8.3* 7.7*   HCT 24.2* 22.7*   * 127*     CMP:   Recent Labs   Lab 02/08/24  0419 02/09/24  0236    138   K 3.8 4.0    101   CO2 23 29    97   BUN 59* 35*   CREATININE 6.6* 4.7*   CALCIUM 7.5* 7.5*   PROT 5.3* 5.2*   ALBUMIN 2.1* 2.0*   BILITOT 0.6 0.6   ALKPHOS 66 63   AST 52* 47*   * 99*   ANIONGAP 10 8       Significant Imaging: I have reviewed all pertinent imaging results/findings within the past 24 hours.

## 2024-02-09 NOTE — DISCHARGE SUMMARY
"[facility] Medicine  Discharge Summary      Patient Name: Domingo Gross  MRN: [id number]  NAOMI: [id number]  Patient Class: IP- Inpatient  Admission Date: 1/27/2024  Hospital Length of Stay: 13 days  Discharge Date and Time:  02/09/2024 5:33 PM  Attending Physician: Khushboo Chapa MD   Discharging Provider: Jude Sanford MD  Primary Care Provider: Analy Encarnacion MD    Primary Care Team: Networked reference to record PCT     HPI:   63-year-old man with PMHx of HTN, Atrial Fibrillation (sees Dr. Calderón, on Amiodarone, Apixaban), COPD (inhaler PRN), CKD (sees Dr. Payne), Hypothyroidism (Levothyroxine) presents via ambulance to [facility] on 1/27/24 for 1-day history of chest pain, cough, irregular breathing. History was collected from patient and wife, who is at bedside.    Patient reports experiencing chest pain, cough, and decreased appetite that started last night. Reports a fever of 102, did not take Tylenol. This morning, wife noticed that he was breathing "funny" and called the ambulance. Patient's boss recently tested positive for Flu/COVID. Patient emphasized that lack of food intake was due to decreased appetite and denied emesis and nausea.    Currently patient denies any chest pain or difficulty breathing. He reports taking potassium at home & being compliant with all medications. He is a former smoker and denies drinking alcohol.    In the ER, vitals were as follows - Temp 99.8 -> 102.3, HR 68, /87, O2 94%. Labs were significant for K 2.4, Magnesium 1.3, BUN/Cr 21/3.2 (baseline 2.8), AST//302, Alk phos 51 WBC 6.15, Procal 0.28. Patient was given 40mEq of potassium PO, 10mEq of KCl and admitted to [facility] for management/further workup of of Hypokalemia, BRETT, PNA, Transaminitis.    Procedure(s) (LRB):  INSERTION, CATHETER, HEMODIALYSIS, DUAL LUMEN (N/A)  REMOVAL, CATHETER, HEMODIALYSIS (Right)      Hospital Course:   Patient admitted to [facility] Medicine for evaluation and management of elevated AST/ALT, BRETT, and pneumonia.     PNA/infection - Initiated on doxycycline and ceftriaxone for CAP coverage. Transitioned to ceftriaxone and azithromycin for presummed COPD exacerbation coverage. Initiated on Duonebs. Completed 5 days total of abx coverage. Weaned to room air. Maintained O2 sat with no respiratory distress. Blood cultures from 1/27 finalized w/ NGTD. UA unremarkable. Patient did experience cyclic fevers max 103F orally. Resolved w/o tylenol (not given 2/2 to elevated LFTs).    BRETT - BUN/Cr continued to elevate. Nephrology ([facility]) consulted, appreciate recommendations. HD was recommended. Anesthesia consulted for temporary HD line placement. Triple lumen line placed 2/3 and subsequently replaced with trialysis line 2/4. Patient initiated on HD 2/4. HD paused for re-evaluation. Determination made to continue HD. General Surgery consulted for permacath placement. Placed 2/9. IR consulted for kidney biopsy. Patient's home eliquis held prior to procedure. Procedure delayed 2/2 to patient's BP above goal of SBP > 160. BP meds: Hydralazine, imdur, coreg, amlodipine. BP lowered over couple days without symptoms. Biopsy procedure completed 2/9. Patient to start outpatient HD Sat 2/10 and continue on TThSat schedule.    Elevated AST/ALT - Discontinued amiodarone, atorvastatin, and tylenol 2/2 to elevation. PT-INR within normal limits. Work-up overall negative. Acute hepatitis panel negative. Viral studies with signs of possible past CMV, EBV but would not explain current flare. ID consulted, appreciate recommendations. Downtrended with unknown clear etiology of cause. Less likely shock considering patient BP initially and during most of hospitalization remained elevated.    Patient remained hemodynamically and clinically stable. Patient plan discussed with patient. Return precautions discussed with patient. Patient verbally acknowledged  understanding and agreement with plan. Sent medications to patient's pharmacy and made patient aware. Patient encouraged to take medications as prescribed and to follow up closely with Cardiology, Nephrology, and PCP.     Goals of Care Treatment Preferences:  Code Status: Full Code      Consults:   Consults (From admission, onward)          Status Ordering Provider     Inpatient consult to General Surgery  Once        Provider:  (Not yet assigned)    Completed CLAIRE LUKE     Inpatient consult to Anesthesiology  Once        Provider:  (Not yet assigned)    Acknowledged MATTHIEU GARCIA     Inpatient consult to Interventional Radiology  Once        Provider:  (Not yet assigned)    Completed DANA MELENDREZ     Inpatient consult to Infectious Diseases  Once        Provider:  (Not yet assigned)    Completed CLAIRE LUKE     Inpatient consult to Nephrology-[facility] ([facility])  Once        Provider:  (Not yet assigned)    Acknowledged CLAIRE LUKE            Pulmonary  CAP (community acquired pneumonia)  RESOLVED.    Cardiac/Vascular  Persistent atrial fibrillation  Patient with documented diagnosis of Atrial Fibrillation per notes by Cardiologist Dmitriy Chavarria MD reports being compliant with the following home regimen: Apixaban 5mg BID, Amiodarone. EKG taken during admission reveals Aflutter/A Fib    Plan:  - [id number] Score: 1  - Will hold Amiodarone in setting of transaminitis.    Hyperlipidemia  Patient with known history of HLD reports being compliant with the following home regimen: Rosuvastatin 40mg QD    Plan:   - Hold statin in setting of elevated transaminitis  - Continue home equivalent Atorvastatin 80 mg QD when appropriate    HTN (hypertension)  Plan:  - Continue home Carvedilol and Imdur  - Continue Eplerenone equivalent, Spironolactone  - Hydralazine TID    Renal/  * BRETT (acute kidney injury)  W/ Baseline CKD  Patient with progressively changing mental status, BUN/Cr  worsening   Asterixis noted on exam.    Plan:  - Nephrology consulted, appreciate recommendations.  - Urine Studies suggesting intrinsic etiology (FeNa 3.9%)  - Strict I&O  - Avoid nephrotoxins and renally dose meds  - Renal biopsy Fri 2/9  - IR consulted, appreciate recommendations.   - Anesthesia consulted for IJ line placement.  Placed 2/3, replaced 2/4.  - Tunneled cath placement 2/9.  - General Surgery consulted, appreciate recommendations.    Uremia  Plan in BRETT    Hyponatremia  RESOLVED      ID  CMV (cytomegalovirus infection)  Plan:  - Quantitative CMV DNA negative.  - Not likely acute infection.    EBV infection  EBV IgM and DNA are negative  these serologies likely represent previous infection    Plan:  - ID consulted, appreciate recommendations.      Endocrine  Hypothyroidism  Patient with reported TSH 5 months ago 13.669    Plan:  Repeat TSH elevated, T4 normal  Increased home levothyroxine     GI  Acute liver failure without hepatic coma  IMPROVING    Plan:  Will hold PRN Tylenol  Abdominal ultrasound with no acute abnormalities  Hepatitis panel negative  Additional work-up labs negative.      Final Active Diagnoses:    Diagnosis Date Noted POA    PRINCIPAL PROBLEM:  BRETT (acute kidney injury) [N17.9] 01/30/2020 Yes    Uremia [N19] 02/03/2024 No    EBV infection [B27.90] 01/31/2024 Yes    CMV (cytomegalovirus infection) [B25.9] 01/31/2024 Yes    Hyponatremia [E87.1] 01/31/2024 Yes    Acute liver failure without hepatic coma [K72.00] 01/27/2024 No    CAP (community acquired pneumonia) [J18.9] 01/27/2024 Yes    Hypothyroidism [E03.9] 03/02/2023 Yes    Persistent atrial fibrillation [I48.19] 11/12/2022 Yes    Hyperlipidemia [E78.5] 06/12/2015 Yes     Chronic    HTN (hypertension) [I10] 04/29/2013 Yes      Problems Resolved During this Admission:    Diagnosis Date Noted Date Resolved POA    Hypomagnesemia [E83.42] 01/27/2024 01/31/2024 Yes    Hypokalemia [E87.6] 10/04/2019 01/31/2024 Yes       Discharged  Condition: stable    Disposition: Home or Self Care    Follow Up:   Follow-up Information       Loly Payne MD Follow up on 3/7/2024.    Specialty: Nephrology  Why: 11:00 am  Contact information:  [address]  [address]  [facility]  [place] [address]  [phone]               Adelaide Puga MD Follow up.    Specialty: Internal Medicine  Why: M W F apt requested per Nirali  Contact information:  [address]  [address]  [place] [address]  [phone]               HEMODIALYSIS Follow up.    Why: TUES THURS SATURDAY   PREETI ERNST  -- 1ST SESSION 2/8 AT 11:45 AM  [address] ·  [place]  [address]   [phone]   FAX [phone]                         Patient Instructions:      Ambulatory referral/consult to Cardiology   Standing Status: Future   Referral Priority: Routine Referral Type: Consultation   Referral Reason: Specialty Services Required   Requested Specialty: Cardiology   Number of Visits Requested: 1     Diet renal     Notify your health care provider if you experience any of the following:  temperature >100.4     Notify your health care provider if you experience any of the following:  persistent nausea and vomiting or diarrhea     Notify your health care provider if you experience any of the following:  severe uncontrolled pain     Notify your health care provider if you experience any of the following:  redness, tenderness, or signs of infection (pain, swelling, redness, odor or green/yellow discharge around incision site)     Notify your health care provider if you experience any of the following:  difficulty breathing or increased cough     Notify your health care provider if you experience any of the following:  severe persistent headache     Notify your health care provider if you experience any of the following:  worsening rash     Notify your health care provider if you experience any of the following:  persistent dizziness, light-headedness, or  visual disturbances     Activity as tolerated       Significant Diagnostic Studies: N/A    Pending Diagnostic Studies:       Procedure Component Value Units Date/Time    CT Biopsy Kidney [[id number]] Resulted: 02/09/24 0907    Order Status: Sent Lab Status: In process Updated: 02/09/24 0937    Cryoglobulin [[id number]] Collected: 02/01/24 1032    Order Status: Sent Lab Status: In process Updated: 02/01/24 1728    Specimen: Blood     Hepatitis B Surface Antibody, Qual/Quant [[id number]] Collected: 02/08/24 1905    Order Status: Sent Lab Status: In process Updated: 02/08/24 2332    Specimen: Blood     Specimen to Pathology, Radiology Kidney, [place] biopsy [[id number]] Collected: 02/09/24 0930    Order Status: Sent Lab Status: In process Updated: 02/09/24 1202           Medications:  Reconciled Home Medications:      Medication List        START taking these medications      amLODIPine 5 MG tablet  Commonly known as: NORVASC  Take 1 tablet (5 mg total) by mouth once daily.  Start taking on: February 10, 2024     hydrALAZINE 50 MG tablet  Commonly known as: APRESOLINE  Take 1 tablet (50 mg total) by mouth every 8 (eight) hours.     hydrOXYzine pamoate 25 MG Cap  Commonly known as: VISTARIL  Take 1 capsule (25 mg total) by mouth nightly as needed (Insomnia/Anxiety).     LIDOcaine 5 %  Commonly known as: LIDODERM  Place 1 patch onto the skin daily as needed (back pain). Remove & Discard patch within 12 hours or as directed by MD            CHANGE how you take these medications      isosorbide mononitrate 30 MG 24 hr tablet  Commonly known as: IMDUR  Take 1 tablet (30 mg total) by mouth once daily.  Start taking on: February 10, 2024  What changed:   medication strength  how much to take     levothyroxine 75 MCG tablet  Commonly known as: SYNTHROID  Take 1 tablet (75 mcg total) by mouth before breakfast.  Start taking on: February 10, 2024  What changed:   medication strength  how much to take  how to take this  when  to take this  additional instructions            CONTINUE taking these medications      albuterol 90 mcg/actuation inhaler  Commonly known as: PROVENTIL/VENTOLIN HFA  INHALE 2 PUFFS INTO THE LUNGS EVERY 6 HOURS AS NEEDED FOR WHEEZING     albuterol-ipratropium 2.5 mg-0.5 mg/3 mL nebulizer solution  Commonly known as: DUO-NEB  Take 3 mLs by nebulization every 6 (six) hours as needed for Wheezing or Shortness of Breath (or cough). Rescue     carvediloL 25 MG tablet  Commonly known as: COREG  Take 1 tablet (25 mg total) by mouth 2 (two) times daily with meals.     clopidogreL 75 mg tablet  Commonly known as: PLAVIX  Take 1 tablet (75 mg total) by mouth once daily.     coenzyme Q10 100 mg capsule  Take 100 mg by mouth once daily.     ELIQUIS 5 mg Tab  Generic drug: apixaban  Take 1 tablet (5 mg total) by mouth 2 (two) times daily.     eplerenone 50 MG Tab  Commonly known as: INSPRA  Take 1 tablet (50 mg total) by mouth once daily.     nitroGLYCERIN 0.4 MG SL tablet  Commonly known as: NITROSTAT  Place 0.4 mg under the tongue every 5 (five) minutes as needed for Chest pain.     ranolazine 500 MG Tb12  Commonly known as: RANEXA  Take 1 tablet (500 mg total) by mouth 2 (two) times daily.     rosuvastatin 40 MG Tab  Commonly known as: CRESTOR  Take 1 tablet (40 mg total) by mouth every evening.     sodium bicarbonate 650 MG tablet  Take 1 tablet (650 mg total) by mouth once daily. for 30 doses            STOP taking these medications      amiodarone 200 MG Tab  Commonly known as: PACERONE     predniSONE 20 MG tablet  Commonly known as: DELTASONE              Indwelling Lines/Drains at time of discharge:   Lines/Drains/Airways       Central Venous Catheter Line  Duration             Percutaneous Central Line Insertion/Assessment - Triple Lumen  02/04/24 1051 Internal Jugular Right 5 days                    Time spent on the discharge of patient: 75 minutes       ________________________  Jude Sanford MD  [facility]  Medicine PGY-2

## 2024-02-09 NOTE — ASSESSMENT & PLAN NOTE
Patient with documented diagnosis of Atrial Fibrillation per notes by Cardiologist Dmitriy Chavarria MD reports being compliant with the following home regimen: Apixaban 5mg BID, Amiodarone. EKG taken during admission reveals Aflutter/A Fib    Plan:  - [id number] Score: 1  - Will hold Amiodarone in setting of transaminitis.

## 2024-02-09 NOTE — CONSULTS
[facility]  INPATIENT Consult    REASON FOR CONSULT/ADMISSION: HD access    HPI: Domingo Gross is a 63 y.o. male with acute on chronic renal failure, s/p temporary HD line.  Needs permcath.    I have reviewed the patient's chart including prior progress notes, procedures and testing.     ROS:   Review of Systems   All other systems reviewed and are negative.      PROBLEM LIST:  Patient Active Problem List   Diagnosis    HTN (hypertension)    Claudication in peripheral vascular disease    DJD (degenerative joint disease), lumbar    DDD (degenerative disc disease)    Hyperlipidemia    Atherosclerosis of native artery of both lower extremities with intermittent claudication    Venous insufficiency of both lower extremities    Abnormal cardiovascular stress test    Coronary artery disease involving native coronary artery of native heart with angina pectoris with documented spasm    Bilateral carotid artery stenosis    Cardiac arrest    BRETT (acute kidney injury)    Nephrotic range proteinuria    Nuclear sclerosis, bilateral    CKD (chronic kidney disease) stage 4, GFR 15-29 ml/min    COPD (chronic obstructive pulmonary disease)    Anemia due to stage 4 chronic kidney disease    Persistent atrial fibrillation    Hypothyroidism    Unstable angina    Paroxysmal atrial fibrillation    Acute liver failure without hepatic coma    CAP (community acquired pneumonia)    EBV infection    CMV (cytomegalovirus infection)    Hyponatremia    Uremia         HISTORY  Past Medical History:   Diagnosis Date    Allergy     Anemia     Anticoagulant long-term use     Arthritis     CKD (chronic kidney disease) stage 4, GFR 15-29 ml/min     COPD (chronic obstructive pulmonary disease) 08/20/2021    Coronary artery disease     Heart attack 10/04/2019    Hematuria     Hemothorax     Hyperlipidemia     Hypertension     Hyperuricemia     Hypocalcemia     Hypokalemia     Hypophosphatemia     Hypothyroidism 3/2/2023    PAD  (peripheral artery disease)     Proteinuria     Vitamin D deficiency        Past Surgical History:   Procedure Laterality Date    ABDOMINAL AORTOGRAPHY N/A 5/10/2019    Procedure: AORTOGRAM-ABDOMINAL;  Surgeon: Keo Jenkins MD;  Location: [facility] CATH LAB/EP;  Service: Cardiology;  Laterality: N/A;  RSFA intervention     AORTOGRAPHY WITH SERIALOGRAPHY N/A 12/3/2021    Procedure: AORTOGRAM, WITH SERIALOGRAPHY;  Surgeon: Keo Jenkins MD;  Location: [facility] CATH LAB/EP;  Service: Cardiology;  Laterality: N/A;    AORTOGRAPHY WITH SERIALOGRAPHY N/A 4/1/2022    Procedure: AORTOGRAM, WITH SERIALOGRAPHY;  Surgeon: Keo Jenkins MD;  Location: [facility] CATH LAB/EP;  Service: Cardiology;  Laterality: N/A;    ATHERECTOMY, CORONARY N/A 4/25/2023    Procedure: Atherectomy-coronary;  Surgeon: Keo Jenkins MD;  Location: [facility] CATH LAB/EP;  Service: Cardiology;  Laterality: N/A;    BONE MARROW BIOPSY Right 10/12/2021    Procedure: BIOPSY-BONE MARROW;  Surgeon: Naseem Woods MD;  Location: [facility] OR;  Service: Oncology;  Laterality: Right;    CARDIAC CATHETERIZATION      CATARACT EXTRACTION      CATARACT EXTRACTION W/  INTRAOCULAR LENS IMPLANT Right 6/8/2020    Procedure: EXTRACTION, CATARACT, WITH IOL INSERTION;  Surgeon: Tin Light MD;  Location: [facility] OR;  Service: Ophthalmology;  Laterality: Right;    CATARACT EXTRACTION W/  INTRAOCULAR LENS IMPLANT Left 7/2/2020    Procedure: EXTRACTION, CATARACT, WITH IOL INSERTION;  Surgeon: Tin Light MD;  Location: [facility] OR;  Service: Ophthalmology;  Laterality: Left;    COLONOSCOPY N/A 1/6/2022    Procedure: COLONOSCOPY;  Surgeon: Kathe Penaloza MD;  [facility]  Service: Endoscopy;  Laterality: N/A;  COVID test at [facility] on 1/3-GT  okay to hold Plavix for 5 days and aspirin per Dr. Becerra  2nd floor due toextensive cardiac history   inst[facility]     CORONARY ANGIOGRAPHY N/A 3/29/2019    Procedure: ANGIOGRAM, CORONARY ARTERY;   Surgeon: Keo Jenkins MD;  Location: [facility] CATH LAB/EP;  Service: Cardiology;  Laterality: N/A;    CORONARY ANGIOGRAPHY Left 10/11/2019    Procedure: ANGIOGRAM, CORONARY ARTERY;  Surgeon: Keo Jenkins MD;  Location: [facility] CATH LAB/EP;  Service: Cardiology;  Laterality: Left;    CORONARY ANGIOGRAPHY N/A 4/10/2023    Procedure: ANGIOGRAM, CORONARY ARTERY;  Surgeon: Keo Jenkins MD;  Location: [facility] CATH LAB/EP;  Service: Cardiology;  Laterality: N/A;    CORONARY ANGIOPLASTY WITH STENT PLACEMENT  03/29/2019    mid and distal RCA    ESOPHAGOGASTRODUODENOSCOPY N/A 1/6/2022    Procedure: EGD (ESOPHAGOGASTRODUODENOSCOPY);  Surgeon: Kathe Penaloza MD;  Location: [address]);  Service: Endoscopy;  Laterality: N/A;    EYE SURGERY      INSTANTANEOUS WAVE-FREE RATIO (IFR) N/A 4/25/2023    Procedure: Instantaneous Wave-Free Ratio (IFR);  Surgeon: Keo Jenkins MD;  Location: [facility] CATH LAB/EP;  Service: Cardiology;  Laterality: N/A;    INTRAVASCULAR ULTRASOUND, NON-CORONARY  8/12/2022    Procedure: Intravascular Ultrasound, Non-Coronary;  Surgeon: Keo Jenkins MD;  Location: [facility] CATH LAB/EP;  Service: Cardiology;;    IVUS, CORONARY  4/25/2023    Procedure: IVUS, Coronary;  Surgeon: Keo Jenkins MD;  Location: [facility] CATH LAB/EP;  Service: Cardiology;;    IVUS, CORONARY  5/16/2023    Procedure: IVUS, Coronary;  Surgeon: Keo Jenkins MD;  Location: [facility] CATH LAB/EP;  Service: Cardiology;;  CX    LEFT HEART CATHETERIZATION N/A 3/29/2019    Procedure: Left heart cath;  Surgeon: Keo Jenkins MD;  Location: [facility] CATH LAB/EP;  Service: Cardiology;  Laterality: N/A;    LEFT HEART CATHETERIZATION Left 10/8/2019    Procedure: Left heart cath;  Surgeon: Keo Jenkins MD;  Location: [facility] CATH LAB/EP;  Service: Cardiology;  Laterality: Left;    LEFT HEART CATHETERIZATION Left 4/10/2023    Procedure: Left heart cath;  Surgeon: Keo Jenkins MD;  Location: [facility] CATH LAB/EP;  Service: Cardiology;  Laterality: Left;     LEFT HEART CATHETERIZATION Left 4/25/2023    Procedure: Left heart cath;  Surgeon: Keo Jenkins MD;  Location: [facility] CATH LAB/EP;  Service: Cardiology;  Laterality: Left;    LEFT HEART CATHETERIZATION Left 5/16/2023    Procedure: Left heart cath;  Surgeon: Keo Jenkins MD;  Location: [facility] CATH LAB/EP;  Service: Cardiology;  Laterality: Left;    PERCUTANEOUS TRANSLUMINAL ANGIOPLASTY (PTA) OF PERIPHERAL VESSEL N/A 7/12/2019    Procedure: PTA, PERIPHERAL VESSEL;  Surgeon: Keo Jenkins MD;  Location: [facility] CATH LAB/EP;  Service: Cardiology;  Laterality: N/A;    PERCUTANEOUS TRANSLUMINAL ANGIOPLASTY (PTA) OF PERIPHERAL VESSEL Left 5/20/2022    Procedure: PTA, PERIPHERAL VESSEL;  Surgeon: Keo Jenkins MD;  Location: [facility] CATH LAB/EP;  Service: Cardiology;  Laterality: Left;    PERCUTANEOUS TRANSLUMINAL ANGIOPLASTY (PTA) OF PERIPHERAL VESSEL Right 8/12/2022    Procedure: PTA, PERIPHERAL VESSEL;  Surgeon: Keo Jenkins MD;  Location: [facility] CATH LAB/EP;  Service: Cardiology;  Laterality: Right;    PERCUTANEOUS TRANSLUMINAL BALLOON ANGIOPLASTY OF CORONARY ARTERY  4/25/2023    Procedure: Angioplasty-coronary;  Surgeon: Keo Jenkins MD;  Location: [facility] CATH LAB/EP;  Service: Cardiology;;    PTCA, SINGLE VESSEL  5/16/2023    Procedure: PTCA, Single Vessel;  Surgeon: Keo Jenkins MD;  Location: [facility] CATH LAB/EP;  Service: Cardiology;;  CX    STENT, DRUG ELUTING, SINGLE VESSEL, CORONARY  4/25/2023    Procedure: Stent, Drug Eluting, Single Vessel, Coronary;  Surgeon: Keo Jenkins MD;  Location: [facility] CATH LAB/EP;  Service: Cardiology;;    STENT, DRUG ELUTING, SINGLE VESSEL, CORONARY  5/16/2023    Procedure: Stent, Drug Eluting, Single Vessel, Coronary;  Surgeon: Keo Jenkins MD;  Location: [facility] CATH LAB/EP;  Service: Cardiology;;  CX    TRANSESOPHAGEAL ECHOCARDIOGRAM WITH POSSIBLE CARDIOVERSION (JOSE W/ POSS CARDIOVERSION) N/A 6/19/2023    Procedure: Transesophageal echo (JOSE) intra-procedure log  documentation;  Surgeon: Keo Jenkins MD;  Location: [facility] CATH LAB/EP;  Service: Cardiology;  Laterality: N/A;       Social History     Tobacco Use    Smoking status: Former     Current packs/day: 0.00     Types: Cigarettes     Quit date: 1999     Years since quittin.8    Smokeless tobacco: Never   Substance Use Topics    Alcohol use: No     Alcohol/week: 0.0 standard drinks of alcohol    Drug use: No       Family History   Problem Relation Age of Onset    Aneurysm Mother     Hypertension Mother     Heart disease Mother     Cancer Father     Diabetes Sister     Hypertension Sister     No Known Problems Brother     No Known Problems Son          MEDS:  Current Facility-Administered Medications on File Prior to Encounter   Medication Dose Route Frequency Provider Last Rate Last Admin    sodium chloride 0.9% infusion   Intravenous Continuous PRN Bushra Cervantes CRNA   New Bag at 22 1033     Current Outpatient Medications on File Prior to Encounter   Medication Sig Dispense Refill    albuterol (PROVENTIL/VENTOLIN HFA) 90 mcg/actuation inhaler INHALE 2 PUFFS INTO THE LUNGS EVERY 6 HOURS AS NEEDED FOR WHEEZING 54 g 3    albuterol-ipratropium (DUO-NEB) 2.5 mg-0.5 mg/3 mL nebulizer solution Take 3 mLs by nebulization every 6 (six) hours as needed for Wheezing or Shortness of Breath (or cough). Rescue 75 mL 3    amiodarone (PACERONE) 200 MG Tab Take 1 tablet (200 mg total) by mouth 2 (two) times daily. 180 tablet 3    apixaban (ELIQUIS) 5 mg Tab Take 1 tablet (5 mg total) by mouth 2 (two) times daily. 60 tablet 11    carvediloL (COREG) 25 MG tablet Take 1 tablet (25 mg total) by mouth 2 (two) times daily with meals. 60 tablet 11    clopidogreL (PLAVIX) 75 mg tablet Take 1 tablet (75 mg total) by mouth once daily. 90 tablet 3    coenzyme Q10 100 mg capsule Take 100 mg by mouth once daily.      eplerenone (INSPRA) 50 MG Tab Take 1 tablet (50 mg total) by mouth once daily. 90 tablet 3    isosorbide  mononitrate (IMDUR) 60 MG 24 hr tablet Take 1 tablet (60 mg total) by mouth once daily. 90 tablet 3    levothyroxine (SYNTHROID) 50 MCG tablet Take one tablet PO every morning on an empty stomach and wait at least 1 hour before eating or taking other medications (Patient taking differently: Take 50 mcg by mouth before breakfast.  wait at least 1 hour before eating or taking other medications) 30 tablet 11    nitroGLYCERIN (NITROSTAT) 0.4 MG SL tablet Place 0.4 mg under the tongue every 5 (five) minutes as needed for Chest pain.      ranolazine (RANEXA) 500 MG Tb12 Take 1 tablet (500 mg total) by mouth 2 (two) times daily. 180 tablet 3    rosuvastatin (CRESTOR) 40 MG Tab Take 1 tablet (40 mg total) by mouth every evening. 90 tablet 3    predniSONE (DELTASONE) 20 MG tablet Take 3 pills daily for 3 days, then 2 pills daily for 3 days, then 1 pill daily for 3 days, then 1/2 pill daily for 4 days. Start 10/5/23 (Patient not taking: Reported on 1/27/2024) 20 tablet 0    sodium bicarbonate 650 MG tablet Take 1 tablet (650 mg total) by mouth once daily. for 30 doses (Patient not taking: Reported on 1/27/2024) 30 tablet 3       ALLERGIES:  Review of patient's allergies indicates:   Allergen Reactions    Ace inhibitors Rash         VITALS:  Temp:  [97 °F (36.1 °C)-98.9 °F (37.2 °C)] 98 °F (36.7 °C)  Pulse:  [53-78] 59  Resp:  [16-20] 18  SpO2:  [92 %-97 %] 97 %  BP: (123-169)/(56-75) 169/72    I/O last 3 completed shifts:  In: 0   Out: 2750 [Urine:1250; Other:1500]      PHYSICAL EXAM:  Physical Exam  Constitutional:       Appearance: Normal appearance.   HENT:      Head: Normocephalic and atraumatic.   Pulmonary:      Effort: Pulmonary effort is normal.   Skin:     General: Skin is warm and dry.   Neurological:      Mental Status: Mental status is at baseline.   Psychiatric:         Mood and Affect: Mood normal.         Behavior: Behavior normal.           LABS:  Lab Results   Component Value Date    WBC 5.91 02/09/2024     RBC 2.68 (L) 02/09/2024    HGB 7.7 (L) 02/09/2024    HCT 22.7 (L) 02/09/2024     (L) 02/09/2024     Lab Results   Component Value Date    GLU 97 02/09/2024     02/09/2024    K 4.0 02/09/2024     02/09/2024    CO2 29 02/09/2024    BUN 35 (H) 02/09/2024    CREATININE 4.7 (H) 02/09/2024    CALCIUM 7.5 (L) 02/09/2024     Lab Results   Component Value Date    ALT 99 (H) 02/09/2024    AST 47 (H) 02/09/2024    ALKPHOS 63 02/09/2024    BILITOT 0.6 02/09/2024     Lab Results   Component Value Date    MG 2.3 02/09/2024    PHOS 4.1 02/09/2024       STUDIES:  CXR images and reports were personally reviewed.        ASSESSMENT & PLAN:  63 y.o. male , to OR for permcath.  Risks, benefits, and alternatives to the procedure were explained to the patient in detail.  All questions were answered and the patient has requested the procedure be done.  Informed consent was obtained.        Wang Mendieta M.D., F.A.C.S.  [facility]  [facility]

## 2024-02-09 NOTE — ASSESSMENT & PLAN NOTE
IMPROVING    Plan:  Will hold PRN Tylenol  Abdominal ultrasound with no acute abnormalities  Hepatitis panel negative  Additional work-up labs negative.

## 2024-02-09 NOTE — OP NOTE
PATIENT: Domingo Gross    MRN: [id number]    DATE OF PROCEDURE:  02/09/2024    PREOPERATIVE DIAGNOSES:   Acute on chronic renal failure.    POSTOPERATIVE DIAGNOSES:  Acute on chronic renal failure.    PROCEDURE:  Removal of temporary dialysis catheter and Insertion of right internal jugular vein permanent dialysis   catheter using fluoroscopic guidance.    SURGEON:  Wang Mendieta M.D.    ASSISTANT:  none    ANESTHESIA:  MAC.    PREP:  Chlorhexidine.    IMPLANT: 16F 23cm double lumen permanent dialysis catheter    SPECIMEN:  None.    ESTIMATED BLOOD LOSS:  Minimal.    INDICATIONS:  The patient is an 62 yo WM who was found to have   renal dysfunction with an imminent   need for hemodialysis.  The patient was agreeable to the initiation of hemodialysis.    The risks of the procedure were described to the patient including,but not limited to bleeding,   infection, pain, scarring, wound complications, potential injury to structures   in the neck or chest warranting more extensive surgery, line infection, and possible death.  The patient demonstrated understanding of these risks   and a consent form was obtained.    PROCEDURE IN DETAIL:  The patient was identified in the Preoperative Unit and   taken back to the Operating Room and laid supine on the operating room table.    IV antibiotics were administered prior to the administration of the anesthesia.    MAC anesthesia was administered without complication.  The patient was then   prepped and draped in a standard sterile fashion.  Timeout procedure was   performed in accordance with hospital protocol.  The internal jugular vein was accessed using the temporary catheter. The wire was passed without difficulty and the temporary catheter was removed.  Fluoroscopy was used to confirm the proper position of the wire.  An incision in the right upper chest was  made and the catheter was tunneled was from this incision towards the neck incision without any issue.  Once the    PermCath cuff was in the appropriate position at the chest site, the peel-away sheath was then inserted over the wire using Seldinger  technique.  The wire and dilator were removed.  The catheter was then placed   into the peel-away sheath and peel-away sheath was removed while the catheter   was threaded forward.  Once this was complete, final fluoroscopic image did   confirm that the catheter was in appropriate position in the distal SVC / right   atrial junction.  Both ports of the catheter withdrew and flushed very easily.    Each port was then instilled with heparinized saline.  The neck incision was closed using vicryl suture in an interrupted fashion and dermabond.  The   chest incision was closed using vicryl suture in an interrupted fashion.    The catheter was fixed to the right chest wall using nylon suture.  Dry   sterile dressings were then applied.  The patient tolerated the procedure well   and there were no complications and was awakened from anesthesia and returned   to the Postoperative Recovery Unit in stable condition.  At the end of the case,  hemostasis was confirmed and sponge, instrument and needle counts were correct.  I was present and scrubbed throughout the entirety of the case.    COMPLICATIONS:  None.    CONDITION:  Stable.    Wang Mendieta M.D., F.A.C.S.  [facility]  [facility]

## 2024-02-09 NOTE — ASSESSMENT & PLAN NOTE
Patient with documented diagnosis of Atrial Fibrillation per notes by Cardiologist Dmitriy Chavarria MD reports being compliant with the following home regimen: Apixaban 5mg BID, Amiodarone. EKG taken during admission reveals Aflutter/A Fib    Plan:  - [id number] Score: 1  - Will hold Amiodarone in setting of transaminitis.

## 2024-02-09 NOTE — PLAN OF CARE
CM contacted [facility] to confirm a Saturday start date if needed. Awaiting return call. [phone]. Assigned CM alerted.

## 2024-02-09 NOTE — CARE UPDATE
"Spoke with Dr. Maradiaga, IR. Recommended "keep 4 hours post biopsy. Biggest issue w him is bp control so it needs to be stable after procedrue too." Patient BP remained stable post procedural. Remains hemodynamically stable including after HD. Biopsy site covered with band-aid with no bleeding. Non-tender to palpation. Provided patient with signs/symptoms to watch out for as well as return precautions. Also alerted patient of HD session tomorrow per my conversations with Dr. Payne, Nephrology.    Patient plan discussed with patient and patient wife. Patient and wife verbally acknowledged understanding and agreement with plan.  ________________________  Jude Sanford MD  [facility] PGY-2    "

## 2024-02-09 NOTE — H&P
Radiology History & Physical      SUBJECTIVE:     Chief Complaint: BRETT    History of Present Illness:  Domingo Gross is a 63 y.o. male who presents for native kidney biopsy.  Past Medical History:   Diagnosis Date    Allergy     Anemia     Anticoagulant long-term use     Arthritis     CKD (chronic kidney disease) stage 4, GFR 15-29 ml/min     COPD (chronic obstructive pulmonary disease) 08/20/2021    Coronary artery disease     Heart attack 10/04/2019    Hematuria     Hemothorax     Hyperlipidemia     Hypertension     Hyperuricemia     Hypocalcemia     Hypokalemia     Hypophosphatemia     Hypothyroidism 3/2/2023    PAD (peripheral artery disease)     Proteinuria     Vitamin D deficiency      Past Surgical History:   Procedure Laterality Date    ABDOMINAL AORTOGRAPHY N/A 5/10/2019    Procedure: AORTOGRAM-ABDOMINAL;  Surgeon: Keo Jenkins MD;  Location: [facility] CATH LAB/EP;  Service: Cardiology;  Laterality: N/A;  RSFA intervention     AORTOGRAPHY WITH SERIALOGRAPHY N/A 12/3/2021    Procedure: AORTOGRAM, WITH SERIALOGRAPHY;  Surgeon: Keo Jenkins MD;  Location: [facility] CATH LAB/EP;  Service: Cardiology;  Laterality: N/A;    AORTOGRAPHY WITH SERIALOGRAPHY N/A 4/1/2022    Procedure: AORTOGRAM, WITH SERIALOGRAPHY;  Surgeon: Koe Jenkins MD;  Location: [facility] CATH LAB/EP;  Service: Cardiology;  Laterality: N/A;    ATHERECTOMY, CORONARY N/A 4/25/2023    Procedure: Atherectomy-coronary;  Surgeon: Keo Jenkins MD;  Location: [facility] CATH LAB/EP;  Service: Cardiology;  Laterality: N/A;    BONE MARROW BIOPSY Right 10/12/2021    Procedure: BIOPSY-BONE MARROW;  Surgeon: Naseem Woods MD;  Location: [facility] OR;  Service: Oncology;  Laterality: Right;    CARDIAC CATHETERIZATION      CATARACT EXTRACTION      CATARACT EXTRACTION W/  INTRAOCULAR LENS IMPLANT Right 6/8/2020    Procedure: EXTRACTION, CATARACT, WITH IOL INSERTION;  Surgeon: Tin Light MD;  Location: [facility] OR;  Service: Ophthalmology;  Laterality: Right;     CATARACT EXTRACTION W/  INTRAOCULAR LENS IMPLANT Left 7/2/2020    Procedure: EXTRACTION, CATARACT, WITH IOL INSERTION;  Surgeon: Tin Light MD;  Location: [facility];  Service: Ophthalmology;  Laterality: Left;    COLONOSCOPY N/A 1/6/2022    Procedure: COLONOSCOPY;  Surgeon: Kathe Penaloza MD;  Location: [facility] ENDO (2ND FLR);  Service: Endoscopy;  Laterality: N/A;  COVID test at [facility] on 1/3-GT  okay to hold Plavix for 5 days and aspirin per Dr. Becerra  2nd floor due toextensive cardiac history   instructions mailed and [facility] -     CORONARY ANGIOGRAPHY N/A 3/29/2019    Procedure: ANGIOGRAM, CORONARY ARTERY;  Surgeon: Keo Jenkins MD;  Location: [facility] CATH LAB/EP;  Service: Cardiology;  Laterality: N/A;    CORONARY ANGIOGRAPHY Left 10/11/2019    Procedure: ANGIOGRAM, CORONARY ARTERY;  Surgeon: Keo Jenkins MD;  Location: [facility] CATH LAB/EP;  Service: Cardiology;  Laterality: Left;    CORONARY ANGIOGRAPHY N/A 4/10/2023    Procedure: ANGIOGRAM, CORONARY ARTERY;  Surgeon: Keo Jenkins MD;  Location: [facility] CATH LAB/EP;  Service: Cardiology;  Laterality: N/A;    CORONARY ANGIOPLASTY WITH STENT PLACEMENT  03/29/2019    mid and distal RCA    ESOPHAGOGASTRODUODENOSCOPY N/A 1/6/2022    Procedure: EGD (ESOPHAGOGASTRODUODENOSCOPY);  Surgeon: Kathe Penaloza MD;  Location: [facility] (2ND FLR);  Service: Endoscopy;  Laterality: N/A;    EYE SURGERY      INSTANTANEOUS WAVE-FREE RATIO (IFR) N/A 4/25/2023    Procedure: Instantaneous Wave-Free Ratio (IFR);  Surgeon: Keo Jenkins MD;  Location: [facility] CATH LAB/EP;  Service: Cardiology;  Laterality: N/A;    INTRAVASCULAR ULTRASOUND, NON-CORONARY  8/12/2022    Procedure: Intravascular Ultrasound, Non-Coronary;  Surgeon: Keo Jenkins MD;  Location: [facility] CATH LAB/EP;  Service: Cardiology;;    IVUS, CORONARY  4/25/2023    Procedure: IVUS, Coronary;  Surgeon: Keo Jenkins MD;  Location: [facility] CATH LAB/EP;  Service: Cardiology;;    IVUS, CORONARY  5/16/2023     Procedure: IVUS, Coronary;  Surgeon: Keo Jenkins MD;  Location: [facility] CATH LAB/EP;  Service: Cardiology;;  CX    LEFT HEART CATHETERIZATION N/A 3/29/2019    Procedure: Left heart cath;  Surgeon: Keo Jenkins MD;  Location: [facility] CATH LAB/EP;  Service: Cardiology;  Laterality: N/A;    LEFT HEART CATHETERIZATION Left 10/8/2019    Procedure: Left heart cath;  Surgeon: Keo Jenkins MD;  Location: [facility] CATH LAB/EP;  Service: Cardiology;  Laterality: Left;    LEFT HEART CATHETERIZATION Left 4/10/2023    Procedure: Left heart cath;  Surgeon: Keo Jenkins MD;  Location: [facility] CATH LAB/EP;  Service: Cardiology;  Laterality: Left;    LEFT HEART CATHETERIZATION Left 4/25/2023    Procedure: Left heart cath;  Surgeon: Keo Jenkins MD;  Location: [facility] CATH LAB/EP;  Service: Cardiology;  Laterality: Left;    LEFT HEART CATHETERIZATION Left 5/16/2023    Procedure: Left heart cath;  Surgeon: Keo Jenkins MD;  Location: [facility] CATH LAB/EP;  Service: Cardiology;  Laterality: Left;    PERCUTANEOUS TRANSLUMINAL ANGIOPLASTY (PTA) OF PERIPHERAL VESSEL N/A 7/12/2019    Procedure: PTA, PERIPHERAL VESSEL;  Surgeon: Keo Jenkins MD;  Location: [facility] CATH LAB/EP;  Service: Cardiology;  Laterality: N/A;    PERCUTANEOUS TRANSLUMINAL ANGIOPLASTY (PTA) OF PERIPHERAL VESSEL Left 5/20/2022    Procedure: PTA, PERIPHERAL VESSEL;  Surgeon: Keo Jenkins MD;  Location: [facility] CATH LAB/EP;  Service: Cardiology;  Laterality: Left;    PERCUTANEOUS TRANSLUMINAL ANGIOPLASTY (PTA) OF PERIPHERAL VESSEL Right 8/12/2022    Procedure: PTA, PERIPHERAL VESSEL;  Surgeon: Keo Jenkins MD;  Location: [facility] CATH LAB/EP;  Service: Cardiology;  Laterality: Right;    PERCUTANEOUS TRANSLUMINAL BALLOON ANGIOPLASTY OF CORONARY ARTERY  4/25/2023    Procedure: Angioplasty-coronary;  Surgeon: Keo Jenkins MD;  Location: [facility] CATH LAB/EP;  Service: Cardiology;;    PTCA, SINGLE VESSEL  5/16/2023    Procedure: PTCA, Single Vessel;  Surgeon: Keo Jenkins MD;   Location: [facility] CATH LAB/EP;  Service: Cardiology;;  CX    STENT, DRUG ELUTING, SINGLE VESSEL, CORONARY  4/25/2023    Procedure: Stent, Drug Eluting, Single Vessel, Coronary;  Surgeon: Keo Jenkins MD;  Location: [facility] CATH LAB/EP;  Service: Cardiology;;    STENT, DRUG ELUTING, SINGLE VESSEL, CORONARY  5/16/2023    Procedure: Stent, Drug Eluting, Single Vessel, Coronary;  Surgeon: Keo Jenkins MD;  Location: [facility] CATH LAB/EP;  Service: Cardiology;;  CX    TRANSESOPHAGEAL ECHOCARDIOGRAM WITH POSSIBLE CARDIOVERSION (JOSE W/ POSS CARDIOVERSION) N/A 6/19/2023    Procedure: Transesophageal echo (JOSE) intra-procedure log documentation;  Surgeon: Keo Jenkins MD;  Location: [facility] CATH LAB/EP;  Service: Cardiology;  Laterality: N/A;       Home Meds:   Prior to Admission medications    Medication Sig Start Date End Date Taking? Authorizing Provider   albuterol (PROVENTIL/VENTOLIN HFA) 90 mcg/actuation inhaler INHALE 2 PUFFS INTO THE LUNGS EVERY 6 HOURS AS NEEDED FOR WHEEZING 10/9/23  Yes Analy Encarnacion MD   albuterol-ipratropium (DUO-NEB) 2.5 mg-0.5 mg/3 mL nebulizer solution Take 3 mLs by nebulization every 6 (six) hours as needed for Wheezing or Shortness of Breath (or cough). Rescue 10/4/23 10/3/24 Yes Analy Encarnacion MD   amiodarone (PACERONE) 200 MG Tab Take 1 tablet (200 mg total) by mouth 2 (two) times daily. 6/19/23 6/18/24 Yes Keo Jenkins MD   apixaban (ELIQUIS) 5 mg Tab Take 1 tablet (5 mg total) by mouth 2 (two) times daily. 12/4/23  Yes Dmitriy Chavarria MD   carvediloL (COREG) 25 MG tablet Take 1 tablet (25 mg total) by mouth 2 (two) times daily with meals. 12/12/23 12/11/24 Yes Analy Encarnacion MD   clopidogreL (PLAVIX) 75 mg tablet Take 1 tablet (75 mg total) by mouth once daily. 4/25/23  Yes Keo Jenkins MD   coenzyme Q10 100 mg capsule Take 100 mg by mouth once daily.   Yes Provider, Historical   eplerenone (INSPRA) 50 MG Tab Take 1 tablet (50 mg total) by mouth once daily. 8/4/23   Yes Keo Jenkins MD   isosorbide mononitrate (IMDUR) 60 MG 24 hr tablet Take 1 tablet (60 mg total) by mouth once daily. 4/10/23 4/9/24 Yes Rodney Phillip MD   levothyroxine (SYNTHROID) 50 MCG tablet Take one tablet PO every morning on an empty stomach and wait at least 1 hour before eating or taking other medications  Patient taking differently: Take 50 mcg by mouth before breakfast.  wait at least 1 hour before eating or taking other medications 4/26/23  Yes Analy Encarnacion MD   nitroGLYCERIN (NITROSTAT) 0.4 MG SL tablet Place 0.4 mg under the tongue every 5 (five) minutes as needed for Chest pain.   Yes Provider, Historical   ranolazine (RANEXA) 500 MG Tb12 Take 1 tablet (500 mg total) by mouth 2 (two) times daily. 4/10/23 4/9/24 Yes Rodney Phillip MD   rosuvastatin (CRESTOR) 40 MG Tab Take 1 tablet (40 mg total) by mouth every evening. 1/22/24  Yes Dmitriy Chavarria MD   predniSONE (DELTASONE) 20 MG tablet Take 3 pills daily for 3 days, then 2 pills daily for 3 days, then 1 pill daily for 3 days, then 1/2 pill daily for 4 days. Start 10/5/23  Patient not taking: Reported on 1/27/2024 10/5/23   Analy Encarnacion MD   sodium bicarbonate 650 MG tablet Take 1 tablet (650 mg total) by mouth once daily. for 30 doses  Patient not taking: Reported on 1/27/2024 12/7/23 1/6/24  Loly Payne MD     Anticoagulants/Antiplatelets: no anticoagulation    Allergies:   Review of patient's allergies indicates:   Allergen Reactions    Ace inhibitors Rash     Sedation History:  no adverse reactions    Review of Systems:   Hematological: no known coagulopathies  Respiratory: no shortness of breath  Cardiovascular: no chest pain  Gastrointestinal: no abdominal pain  Genito-Urinary: no dysuria  Musculoskeletal: negative  Neurological: no TIA or stroke symptoms         OBJECTIVE:     Vital Signs (Most Recent)  Temp: 98 °F (36.7 °C) (02/09/24 0818)  Pulse: (!) 59 (02/09/24 0920)  Resp: 11 (02/09/24 0920)  BP: (!) 141/66  (02/09/24 0920)  SpO2: 98 % (02/09/24 0920)    Physical Exam:  ASA: 3  Mallampati: 2    General: no acute distress  Mental Status: alert and oriented to person, place and time  HEENT: normocephalic, atraumatic  Chest: unlabored breathing  Heart: regular heart rate  Abdomen: nondistended  Extremity: moves all extremities    Laboratory  Lab Results   Component Value Date    INR 1.1 02/08/2024       Lab Results   Component Value Date    WBC 5.91 02/09/2024    HGB 7.7 (L) 02/09/2024    HCT 22.7 (L) 02/09/2024    MCV 85 02/09/2024     (L) 02/09/2024      Lab Results   Component Value Date    GLU 97 02/09/2024     02/09/2024    K 4.0 02/09/2024     02/09/2024    CO2 29 02/09/2024    BUN 35 (H) 02/09/2024    CREATININE 4.7 (H) 02/09/2024    CALCIUM 7.5 (L) 02/09/2024    MG 2.3 02/09/2024    ALT 99 (H) 02/09/2024    AST 47 (H) 02/09/2024    ALBUMIN 2.0 (L) 02/09/2024    BILITOT 0.6 02/09/2024       ASSESSMENT/PLAN:     Sedation Plan: Moderate  Patient will undergo native kidney biopsy.    Grady Maradiaga M.D.  Interventional Radiology  Department of Radiology

## 2024-02-09 NOTE — PROGRESS NOTES
"[facility] Medicine  Progress Note    Patient Name: Domingo Gross  MRN: [id number]  Patient Class: IP- Inpatient   Admission Date: 1/27/2024  Length of Stay: 13 days  Attending Physician: Khushboo Chapa MD  Primary Care Provider: Analy Encarnacion MD        Subjective:     Principal Problem:BRETT (acute kidney injury)        HPI:  63-year-old man with PMHx of HTN, Atrial Fibrillation (sees Dr. Calderón, on Amiodarone, Apixaban), COPD (inhaler PRN), CKD (sees Dr. Payne), Hypothyroidism (Levothyroxine) presents via ambulance to [facility] on 1/27/24 for 1-day history of chest pain, cough, irregular breathing. History was collected from patient and wife, who is at bedside.    Patient reports experiencing chest pain, cough, and decreased appetite that started last night. Reports a fever of 102, did not take Tylenol. This morning, wife noticed that he was breathing "funny" and called the ambulance. Patient's boss recently tested positive for Flu/COVID. Patient emphasized that lack of food intake was due to decreased appetite and denied emesis and nausea.    Currently patient denies any chest pain or difficulty breathing. He reports taking potassium at home & being compliant with all medications. He is a former smoker and denies drinking alcohol.    In the ER, vitals were as follows - Temp 99.8 -> 102.3, HR 68, /87, O2 94%. Labs were significant for K 2.4, Magnesium 1.3, BUN/Cr 21/3.2 (baseline 2.8), AST//302, Alk phos 51 WBC 6.15, Procal 0.28. Patient was given 40mEq of potassium PO, 10mEq of KCl and admitted to [facility] for management/further workup of of Hypokalemia, BRETT, PNA, Transaminitis.    Overview/Hospital Course:  Patient admitted to [facility] for evaluation and management of elevated AST/ALT, BRETT, and pneumonia.     PNA/infection - Initiated on doxycycline and ceftriaxone for CAP coverage. Transitioned to ceftriaxone and azithromycin for presummed " COPD exacerbation coverage. Initiated on Duonebs. Completed 5 days total of abx coverage. Weaned to room air. Maintained O2 sat with no respiratory distress. Blood cultures from 1/27 finalized w/ NGTD. UA unremarkable. Patient did experience cyclic fevers max 103F orally. Resolved w/o tylenol (not given 2/2 to elevated LFTs).    BRETT - BUN/Cr continued to elevate. Nephrology ([facility]) consulted, appreciate recommendations. HD was recommended. Anesthesia consulted for temporary HD line placement. Triple lumen line placed 2/3 and subsequently replaced with trialysis line 2/4. Patient initiated on HD 2/4. HD paused for re-evaluation. Determination made to continue HD. General Surgery consulted for permacath placement. Placed 2/9. IR consulted for kidney biopsy. Patient's home eliquis held prior to procedure. Procedure delayed 2/2 to patient's BP above goal of SBP > 160. BP meds: Hydralazine, imdur, coreg, amlodipine. BP lowered over couple days without symptoms. Biopsy procedure completed 2/9.    Elevated AST/ALT - Discontinued amiodarone, atorvastatin, and tylenol 2/2 to elevation. PT-INR within normal limits. Work-up overall negative. Acute hepatitis panel negative. Viral studies with signs of possible past CMV, EBV but would not explain current flare. ID consulted, appreciate recommendations. Downtrended with unknown clear etiology of cause. Less likely shock considering patient BP initially and during most of hospitalization remained elevated.    Interval History: No adverse events overnight. BP < 160 over last 24 hrs.    Review of Systems   Constitutional:  Negative for appetite change, chills and fever.   HENT:  Negative for rhinorrhea, sore throat and trouble swallowing.    Eyes:  Negative for pain and visual disturbance.   Respiratory:  Negative for cough and shortness of breath.    Cardiovascular:  Negative for chest pain, palpitations and leg swelling.   Gastrointestinal:  Negative for abdominal  pain, constipation, diarrhea, nausea and vomiting.   Genitourinary:  Negative for dysuria and hematuria.   Musculoskeletal:  Negative for gait problem and neck stiffness.   Skin:  Negative for rash and wound.   Neurological:  Negative for tremors, weakness and headaches.   Psychiatric/Behavioral:  Negative for agitation and confusion.      Objective:     Vital Signs (Most Recent):  Temp: 97.7 °F (36.5 °C) (02/09/24 1050)  Pulse: (!) 56 (02/09/24 1118)  Resp: 12 (02/09/24 1118)  BP: (!) 112/54 (02/09/24 1118)  SpO2: (!) 92 % (02/09/24 1118) Vital Signs (24h Range):  Temp:  [97 °F (36.1 °C)-98.9 °F (37.2 °C)] 97.7 °F (36.5 °C)  Pulse:  [53-78] 56  Resp:  [10-20] 12  SpO2:  [91 %-99 %] 92 %  BP: ()/(50-75) 112/54     Weight: 79.9 kg (176 lb 2.4 oz)  Body mass index is 25.27 kg/m².    Intake/Output Summary (Last 24 hours) at 2/9/2024 1223  Last data filed at 2/9/2024 0046  Gross per 24 hour   Intake 0 ml   Output 2050 ml   Net -2050 ml         Physical Exam  Vitals and nursing note reviewed.   Constitutional:       General: He is not in acute distress.     Appearance: Normal appearance. He is normal weight. He is not ill-appearing or toxic-appearing.   HENT:      Head: Normocephalic.      Right Ear: External ear normal.      Left Ear: External ear normal.      Nose: Nose normal.      Mouth/Throat:      Pharynx: Oropharynx is clear.   Eyes:      Extraocular Movements: Extraocular movements intact.   Neck:      Comments: RIJ  Cardiovascular:      Rate and Rhythm: Normal rate and regular rhythm.      Pulses: Normal pulses.   Pulmonary:      Effort: Pulmonary effort is normal. No respiratory distress.      Breath sounds: No wheezing, rhonchi or rales.   Abdominal:      General: Abdomen is flat. There is no distension.      Palpations: Abdomen is soft.      Tenderness: There is no abdominal tenderness. There is no guarding or rebound.   Musculoskeletal:         General: Normal range of motion.      Cervical back:  Normal range of motion.      Right lower leg: No edema.      Left lower leg: No edema.   Skin:     General: Skin is warm.      Coloration: Skin is not jaundiced.   Neurological:      General: No focal deficit present.      Mental Status: He is alert and oriented to person, place, and time. Mental status is at baseline.      Motor: No weakness.   Psychiatric:         Mood and Affect: Mood normal.         Behavior: Behavior normal.         Thought Content: Thought content normal.             Significant Labs: All pertinent labs within the past 24 hours have been reviewed.  CBC:   Recent Labs   Lab 02/08/24 0419 02/09/24 0236   WBC 5.26 5.91   HGB 8.3* 7.7*   HCT 24.2* 22.7*   * 127*     CMP:   Recent Labs   Lab 02/08/24 0419 02/09/24 0236    138   K 3.8 4.0    101   CO2 23 29    97   BUN 59* 35*   CREATININE 6.6* 4.7*   CALCIUM 7.5* 7.5*   PROT 5.3* 5.2*   ALBUMIN 2.1* 2.0*   BILITOT 0.6 0.6   ALKPHOS 66 63   AST 52* 47*   * 99*   ANIONGAP 10 8       Significant Imaging: I have reviewed all pertinent imaging results/findings within the past 24 hours.    Assessment/Plan:      * BRETT (acute kidney injury)  W/ Baseline CKD  Patient with progressively changing mental status, BUN/Cr worsening   Asterixis noted on exam.    Plan:  - Nephrology consulted, appreciate recommendations.  - Urine Studies suggesting intrinsic etiology (FeNa 3.9%)  - Strict I&O  - Avoid nephrotoxins and renally dose meds  - Renal biopsy Fri 2/9  - IR consulted, appreciate recommendations.   - Anesthesia consulted for IJ line placement.  Placed 2/3, replaced 2/4.  - Tunneled cath placement 2/9.  - General Surgery consulted, appreciate recommendations.    Uremia  Plan in BRETT    Hyponatremia  RESOLVED      CMV (cytomegalovirus infection)  Plan:  - Quantitative CMV DNA negative.  - Not likely acute infection.    EBV infection  EBV IgM and DNA are negative  these serologies likely represent previous  infection    Plan:  - ID consulted, appreciate recommendations.      CAP (community acquired pneumonia)  RESOLVED.    Acute liver failure without hepatic coma  IMPROVING    Plan:  Will hold PRN Tylenol  Abdominal ultrasound with no acute abnormalities  Hepatitis panel negative  Additional work-up labs negative.    Hypothyroidism  Patient with reported TSH 5 months ago 13.669    Plan:  Repeat TSH elevated, T4 normal  Increased home levothyroxine     Persistent atrial fibrillation  Patient with documented diagnosis of Atrial Fibrillation per notes by Cardiologist Dmitriy Chavarria MD reports being compliant with the following home regimen: Apixaban 5mg BID, Amiodarone. EKG taken during admission reveals Aflutter/A Fib    Plan:  - [id number] Score: 1  - Will hold Amiodarone in setting of transaminitis.    Hyperlipidemia  Patient with known history of HLD reports being compliant with the following home regimen: Rosuvastatin 40mg QD    Plan:   - Hold statin in setting of elevated transaminitis  - Continue home equivalent Atorvastatin 80 mg QD when appropriate    HTN (hypertension)  Plan:  - Continue home Carvedilol and Imdur  - Continue Eplerenone equivalent, Spironolactone  - Hydralazine TID      VTE Risk Mitigation (From admission, onward)           Ordered     IP VTE HIGH RISK PATIENT  Once         01/27/24 1240     Place sequential compression device  Until discontinued         01/27/24 1240                    Discharge Planning   FLACO: 2/10/2024     Code Status: Full Code   Is the patient medically ready for discharge?:     Reason for patient still in hospital (select all that apply): Consult recommendations  Discharge Plan A: Home, Home with family   Discharge Delays:  (PENDING MEDICAL STABILITY)      ________________________  Jude Sanford MD  [facility] PGY-2

## 2024-02-09 NOTE — PROCEDURES
Radiology Post-Procedure Note    Pre Op Diagnosis: BRETT    Post Op Diagnosis: Same    Procedure: Native kidney biopsy    Procedure performed by: Grady Maradiaga MD    Written Informed Consent Obtained: Yes  Specimen Removed: YES 4 x 18 gauge cores  Estimated Blood Loss: Minimal    Findings:   Under CT guidance, 17/18 gauge biopsy system was used to sample native left kidney. Tract embolized with gelfoam slurry. No complications. See dictation.    Patient tolerated procedure well.    Grady Maradiaga M.D.  Interventional Radiology  Department of Radiology

## 2024-02-09 NOTE — PLAN OF CARE
Problem: Adult Inpatient Plan of Care  Goal: Plan of Care Review  Outcome: Ongoing, Progressing     Problem: Fluid and Electrolyte Imbalance (Acute Kidney Injury/Impairment)  Goal: Fluid and Electrolyte Balance  Outcome: Ongoing, Progressing     Problem: Fluid Imbalance (Pneumonia)  Goal: Fluid Balance  Outcome: Ongoing, Progressing

## 2024-02-09 NOTE — PROGRESS NOTES
"[facility]  Adult Nutrition  Progress Note    SUMMARY       Recommendations    Recommendation:  1. When diet resumed, pt will start on cardiac renal diet  2. Encourage intake at meals  3. Monitor weight/labs  4. RD to follow to monitor PO intake    Goals:  Pt will be resumed on cardiac renal diet by RD follow-up  Nutrition Goal Status: new  Communication of RD Recs:  (POC)    Assessment and Plan    Nutrition Problem  Altered Nutrition Related Lab Values    Related to (etiology):   BRETT; CKD Stage 4    Signs and Symptoms (as evidenced by):   BUN 55 H  Creatinine 6.9 H  eGFR 9 L  Albumin 2.1 L    Interventions:  Collaboration with other providers    Nutrition Diagnosis Status:   Improving (BUN 35H; Creatinine 4.7H; eGFR 13L; Albumin 2.0L)    Malnutrition Assessment  Weight Loss (Malnutrition):  (4.7% x 6 months)     Reason for Assessment  Reason For Assessment: RD follow-up  Diagnosis: renal disease (BRETT)  Relevant Medical History: anticoagulants long-term use; arthritis; CKD stage 4; COPD; CAD; hypertension; hypothyroidism; anemia; heart attack; hyperlipidemia; hyperuricemia; hypocalcemia; hypokalemia; PAD; atherectomy; artography w/ serialography; cardiac catheterization; coronary angiography; left heart cathaterization; stent; PTCA  General Information Comments: Pt NPO for HD cath insertion 2/9. Pt previously on regular diet. Brian 21- skin intact. Unable to assess NFPE at this time, pt off unit. Noted 20 lb wt loss x 6 months  Nutrition Discharge Planning: d/c to be determined    Nutrition Risk Screen  Nutrition Risk Screen: no indicators present    Nutrition/Diet History  Food Preferences: no cultural or Hoahaoism preferences identified  Spiritual, Cultural Beliefs, Mu-ism Practices, Values that Affect Care: no  Factors Affecting Nutritional Intake: other (see comments) (NPO for HD cath insertion)    Anthropometrics  Temp: 97.7 °F (36.5 °C)  Height Method: Stated  Height: 5' 10" (177.8 cm)  Height " (inches): 70 in  Weight Method: Bed Scale  Weight: 79.9 kg (176 lb 2.4 oz)  Weight (lb): 176.15 lb  Ideal Body Weight (IBW), Male: 166 lb  % Ideal Body Weight, Male (lb): 106.11 %  BMI (Calculated): 25.3  BMI Grade: 25 - 29.9 - overweight  Usual Body Weight (UBW), k.9 kg (8/2)  % Usual Body Weight: 95.43  % Weight Change From Usual Weight: -4.77 %     Lab/Procedures/Meds  Pertinent Labs Reviewed: reviewed  Pertinent Labs Comments: RBC 2.69L; Hemoglobin 7.7L; Hematocrit 22.7L; Platelet count 127L; Lymph% 17.8L; BUN 35H; Creatinine 4.7H; eGFR 13L; Calcium 7.5L; Protein total 5.2L; Albumin 2.0L; AST 47H; ALT 99H  Pertinent Medications Reviewed: reviewed  Pertinent Medications Comments: duo-carmen; carvidolol; hydralazine; isosorbide mononitrate    Estimated/Assessed Needs  Weight Used For Calorie Calculations: 75.3 kg (166 lb)  Energy Calorie Requirements (kcal): 2258 kcal/kg (30kcal/kg IBW)  Energy Need Method: Kcal/kg  Protein Requirements: 75-90 g (1.0-1.2 g/kg IBW)  Weight Used For Protein Calculations: 75.3 kg (166 lb)     Estimated Fluid Requirement Method: RDA Method  RDA Method (mL): 2258     Nutrition Prescription Ordered  Current Diet Order: NPO  Oral Nutrition Supplement: Novasource renal    Evaluation of Received Nutrient/Fluid Intake  I/O:   Energy Calories Required: not meeting needs  Protein Required: not meeting needs  Fluid Required: not meeting needs  Comments: LBM: 24  % Intake of Estimated Energy Needs: Other: NPO  % Meal Intake: NPO    Nutrition Risk  Level of Risk/Frequency of Follow-up:  (2x weekly)     Monitor and Evaluation  Food and Nutrient Intake: energy intake  Food and Nutrient Adminstration: diet order  Physical Activity and Function: nutrition-related ADLs and IADLs  Anthropometric Measurements: weight  Biochemical Data, Medical Tests and Procedures: electrolyte and renal panel, lipid profile, gastrointestinal profile  Nutrition-Focused Physical Findings: overall appearance      Nutrition Follow-Up  RD Follow-up?: Yes

## 2024-02-09 NOTE — TRANSFER OF CARE
"Anesthesia Transfer of Care Note    Patient: Domingo Gross    Procedure(s) Performed: Procedure(s) (LRB):  INSERTION, CATHETER, HEMODIALYSIS, DUAL LUMEN (N/A)    Patient location: PACU    Anesthesia Type: general    Transport from OR: Transported from OR on room air with adequate spontaneous ventilation    Post pain: adequate analgesia    Post assessment: no apparent anesthetic complications and tolerated procedure well    Post vital signs: stable    Level of consciousness: awake and alert    Nausea/Vomiting: no nausea/vomiting    Complications: none    Transfer of care protocol was followed      Last vitals: Visit Vitals  BP (!) 128/55 (BP Location: Right arm)   Pulse 61   Temp 36.7 °C (98 °F) (Oral)   Resp 12   Ht 5' 10" (1.778 m)   Wt 79.9 kg (176 lb 2.4 oz)   SpO2 98%   BMI 25.27 kg/m²     "

## 2024-02-09 NOTE — PLAN OF CARE
CM contacted [facility] (formerly [facility]) to confirm a Saturday start date if needed. Spoke with nurse working today. Alerted to pt's issued and potential for DC to start Outpt HD on Saturday. Reports that if already arranged, pt can still start on Saturday. He reportedly took down pt's name to give to Saturday nurses to alert them to pt if needed.  [phone]. Assigned CM alerted.

## 2024-02-09 NOTE — ANESTHESIA PREPROCEDURE EVALUATION
[facility]  Anesthesia Pre-Operative Evaluation         Patient Name: Domingo Gross  YOB: 1960  MRN: [id number]    SUBJECTIVE:     Pre-operative evaluation for Procedure(s) (LRB):  INSERTION, CATHETER, HEMODIALYSIS, DUAL LUMEN (N/A)     02/09/2024    Domingo Gross is a 63 y.o. male w/ a significant PMHx as below who presents for the above procedure.    TTE 6/2023  The left ventricle is normal in size with normal systolic function.  The estimated ejection fraction is 55%.  Normal right ventricular size with normal right ventricular systolic function.  Severe left atrial enlargement.  Mild mitral regurgitation.  The estimated PA systolic pressure is 38 mmHg.  Normal central venous pressure (3 mmHg).  A diastolic pattern consistent with atrial fibrillation observed.    LDA: None documented.    Prev airway: None documented.     Drips: None documented.    Patient Active Problem List   Diagnosis    HTN (hypertension)    Claudication in peripheral vascular disease    DJD (degenerative joint disease), lumbar    DDD (degenerative disc disease)    Hyperlipidemia    Atherosclerosis of native artery of both lower extremities with intermittent claudication    Venous insufficiency of both lower extremities    Abnormal cardiovascular stress test    Coronary artery disease involving native coronary artery of native heart with angina pectoris with documented spasm    Bilateral carotid artery stenosis    Cardiac arrest    BRETT (acute kidney injury)    Nephrotic range proteinuria    Nuclear sclerosis, bilateral    CKD (chronic kidney disease) stage 4, GFR 15-29 ml/min    COPD (chronic obstructive pulmonary disease)    Anemia due to stage 4 chronic kidney disease    Persistent atrial fibrillation    Hypothyroidism    Unstable angina    Paroxysmal atrial fibrillation    Acute liver failure without hepatic coma    CAP (community acquired pneumonia)    EBV infection    CMV  (cytomegalovirus infection)    Hyponatremia    Uremia       Review of patient's allergies indicates:   Allergen Reactions    Ace inhibitors Rash       Current Inpatient Medications:   sodium chloride 0.9%   Intravenous Once    sodium chloride 0.9%   Intravenous Once    sodium chloride 0.9%   Intravenous Once    albuterol-ipratropium  3 mL Nebulization Q4H    amLODIPine  5 mg Oral Daily    carvediloL  25 mg Oral BID WM    hydrALAZINE  50 mg Oral Q8H    isosorbide mononitrate  30 mg Oral Daily    levothyroxine  75 mcg Oral Before breakfast       Current Facility-Administered Medications on File Prior to Encounter   Medication Dose Route Frequency Provider Last Rate Last Admin    sodium chloride 0.9% infusion   Intravenous Continuous PRN Bushra Cevrantes CRNA   New Bag at 11/14/22 1033     Current Outpatient Medications on File Prior to Encounter   Medication Sig Dispense Refill    albuterol (PROVENTIL/VENTOLIN HFA) 90 mcg/actuation inhaler INHALE 2 PUFFS INTO THE LUNGS EVERY 6 HOURS AS NEEDED FOR WHEEZING 54 g 3    albuterol-ipratropium (DUO-NEB) 2.5 mg-0.5 mg/3 mL nebulizer solution Take 3 mLs by nebulization every 6 (six) hours as needed for Wheezing or Shortness of Breath (or cough). Rescue 75 mL 3    amiodarone (PACERONE) 200 MG Tab Take 1 tablet (200 mg total) by mouth 2 (two) times daily. 180 tablet 3    apixaban (ELIQUIS) 5 mg Tab Take 1 tablet (5 mg total) by mouth 2 (two) times daily. 60 tablet 11    carvediloL (COREG) 25 MG tablet Take 1 tablet (25 mg total) by mouth 2 (two) times daily with meals. 60 tablet 11    clopidogreL (PLAVIX) 75 mg tablet Take 1 tablet (75 mg total) by mouth once daily. 90 tablet 3    coenzyme Q10 100 mg capsule Take 100 mg by mouth once daily.      eplerenone (INSPRA) 50 MG Tab Take 1 tablet (50 mg total) by mouth once daily. 90 tablet 3    isosorbide mononitrate (IMDUR) 60 MG 24 hr tablet Take 1 tablet (60 mg total) by mouth once daily. 90 tablet 3    levothyroxine (SYNTHROID) 50  MCG tablet Take one tablet PO every morning on an empty stomach and wait at least 1 hour before eating or taking other medications (Patient taking differently: Take 50 mcg by mouth before breakfast.  wait at least 1 hour before eating or taking other medications) 30 tablet 11    nitroGLYCERIN (NITROSTAT) 0.4 MG SL tablet Place 0.4 mg under the tongue every 5 (five) minutes as needed for Chest pain.      ranolazine (RANEXA) 500 MG Tb12 Take 1 tablet (500 mg total) by mouth 2 (two) times daily. 180 tablet 3    rosuvastatin (CRESTOR) 40 MG Tab Take 1 tablet (40 mg total) by mouth every evening. 90 tablet 3    predniSONE (DELTASONE) 20 MG tablet Take 3 pills daily for 3 days, then 2 pills daily for 3 days, then 1 pill daily for 3 days, then 1/2 pill daily for 4 days. Start 10/5/23 (Patient not taking: Reported on 1/27/2024) 20 tablet 0    sodium bicarbonate 650 MG tablet Take 1 tablet (650 mg total) by mouth once daily. for 30 doses (Patient not taking: Reported on 1/27/2024) 30 tablet 3       Past Surgical History:   Procedure Laterality Date    ABDOMINAL AORTOGRAPHY N/A 5/10/2019    Procedure: AORTOGRAM-ABDOMINAL;  Surgeon: Keo Jenkins MD;  Location: [facility] CATH LAB/EP;  Service: Cardiology;  Laterality: N/A;  RSFA intervention     AORTOGRAPHY WITH SERIALOGRAPHY N/A 12/3/2021    Procedure: AORTOGRAM, WITH SERIALOGRAPHY;  Surgeon: Keo Jenkins MD;  Location: [facility] CATH LAB/EP;  Service: Cardiology;  Laterality: N/A;    AORTOGRAPHY WITH SERIALOGRAPHY N/A 4/1/2022    Procedure: AORTOGRAM, WITH SERIALOGRAPHY;  Surgeon: Keo Jenkins MD;  Location: [facility] CATH LAB/EP;  Service: Cardiology;  Laterality: N/A;    ATHERECTOMY, CORONARY N/A 4/25/2023    Procedure: Atherectomy-coronary;  Surgeon: Keo Jenkins MD;  Location: [facility] CATH LAB/EP;  Service: Cardiology;  Laterality: N/A;    BONE MARROW BIOPSY Right 10/12/2021    Procedure: BIOPSY-BONE MARROW;  Surgeon: Naseem Woods MD;  Location: [facility] OR;  Service: Oncology;   Laterality: Right;    CARDIAC CATHETERIZATION      CATARACT EXTRACTION      CATARACT EXTRACTION W/  INTRAOCULAR LENS IMPLANT Right 6/8/2020    Procedure: EXTRACTION, CATARACT, WITH IOL INSERTION;  Surgeon: Tin Light MD;  Location: [facility] OR;  Service: Ophthalmology;  Laterality: Right;    CATARACT EXTRACTION W/  INTRAOCULAR LENS IMPLANT Left 7/2/2020    Procedure: EXTRACTION, CATARACT, WITH IOL INSERTION;  Surgeon: Tin Light MD;  Location: [facility] OR;  Service: Ophthalmology;  Laterality: Left;    COLONOSCOPY N/A 1/6/2022    Procedure: COLONOSCOPY;  Surgeon: Kathe Penaloza MD;  Location: [facility] ENDO (2ND FLR);  Service: Endoscopy;  Laterality: N/A;  COVID test at [facility] on 1/3-GT  okay to hold Plavix for 5 days and aspirin per Dr. Becerra  2nd floor due toextensive cardiac history   instructions mailed and my [facility] portal -     CORONARY ANGIOGRAPHY N/A 3/29/2019    Procedure: ANGIOGRAM, CORONARY ARTERY;  Surgeon: Keo Jenkins MD;  Location: [facility] CATH LAB/EP;  Service: Cardiology;  Laterality: N/A;    CORONARY ANGIOGRAPHY Left 10/11/2019    Procedure: ANGIOGRAM, CORONARY ARTERY;  Surgeon: Keo Jenkins MD;  Location: [facility] CATH LAB/EP;  Service: Cardiology;  Laterality: Left;    CORONARY ANGIOGRAPHY N/A 4/10/2023    Procedure: ANGIOGRAM, CORONARY ARTERY;  Surgeon: Keo Jenkins MD;  Location: [facility] CATH LAB/EP;  Service: Cardiology;  Laterality: N/A;    CORONARY ANGIOPLASTY WITH STENT PLACEMENT  03/29/2019    mid and distal RCA    ESOPHAGOGASTRODUODENOSCOPY N/A 1/6/2022    Procedure: EGD (ESOPHAGOGASTRODUODENOSCOPY);  Surgeon: Kathe Penaloza MD;  Location: [facility] ENDO (2ND FLR);  Service: Endoscopy;  Laterality: N/A;    EYE SURGERY      INSTANTANEOUS WAVE-FREE RATIO (IFR) N/A 4/25/2023    Procedure: Instantaneous Wave-Free Ratio (IFR);  Surgeon: Keo Jenkins MD;  Location: [facility] CATH LAB/EP;  Service: Cardiology;  Laterality: N/A;    INTRAVASCULAR ULTRASOUND, NON-CORONARY  8/12/2022    Procedure:  Intravascular Ultrasound, Non-Coronary;  Surgeon: Keo Jenkins MD;  Location: [facility] CATH LAB/EP;  Service: Cardiology;;    IVUS, CORONARY  4/25/2023    Procedure: IVUS, Coronary;  Surgeon: Keo Jenkins MD;  Location: [facility] CATH LAB/EP;  Service: Cardiology;;    IVUS, CORONARY  5/16/2023    Procedure: IVUS, Coronary;  Surgeon: Keo Jenkins MD;  Location: [facility] CATH LAB/EP;  Service: Cardiology;;  CX    LEFT HEART CATHETERIZATION N/A 3/29/2019    Procedure: Left heart cath;  Surgeon: Keo Jenkins MD;  Location: [facility] CATH LAB/EP;  Service: Cardiology;  Laterality: N/A;    LEFT HEART CATHETERIZATION Left 10/8/2019    Procedure: Left heart cath;  Surgeon: Keo Jenkins MD;  Location: [facility] CATH LAB/EP;  Service: Cardiology;  Laterality: Left;    LEFT HEART CATHETERIZATION Left 4/10/2023    Procedure: Left heart cath;  Surgeon: Keo Jenkins MD;  Location: [facility] CATH LAB/EP;  Service: Cardiology;  Laterality: Left;    LEFT HEART CATHETERIZATION Left 4/25/2023    Procedure: Left heart cath;  Surgeon: Keo Jenkins MD;  Location: [facility] CATH LAB/EP;  Service: Cardiology;  Laterality: Left;    LEFT HEART CATHETERIZATION Left 5/16/2023    Procedure: Left heart cath;  Surgeon: Keo Jenkins MD;  Location: [facility] CATH LAB/EP;  Service: Cardiology;  Laterality: Left;    PERCUTANEOUS TRANSLUMINAL ANGIOPLASTY (PTA) OF PERIPHERAL VESSEL N/A 7/12/2019    Procedure: PTA, PERIPHERAL VESSEL;  Surgeon: Keo Jenkins MD;  Location: [facility] CATH LAB/EP;  Service: Cardiology;  Laterality: N/A;    PERCUTANEOUS TRANSLUMINAL ANGIOPLASTY (PTA) OF PERIPHERAL VESSEL Left 5/20/2022    Procedure: PTA, PERIPHERAL VESSEL;  Surgeon: Keo Jenkins MD;  Location: [facility] CATH LAB/EP;  Service: Cardiology;  Laterality: Left;    PERCUTANEOUS TRANSLUMINAL ANGIOPLASTY (PTA) OF PERIPHERAL VESSEL Right 8/12/2022    Procedure: PTA, PERIPHERAL VESSEL;  Surgeon: Keo Jenkins MD;  Location: [facility] CATH LAB/EP;  Service: Cardiology;  Laterality: Right;     PERCUTANEOUS TRANSLUMINAL BALLOON ANGIOPLASTY OF CORONARY ARTERY  2023    Procedure: Angioplasty-coronary;  Surgeon: Keo Jenkins MD;  Location: [facility] CATH LAB/EP;  Service: Cardiology;;    PTCA, SINGLE VESSEL  2023    Procedure: PTCA, Single Vessel;  Surgeon: Keo Jenkins MD;  Location: [facility] CATH LAB/EP;  Service: Cardiology;;  CX    STENT, DRUG ELUTING, SINGLE VESSEL, CORONARY  2023    Procedure: Stent, Drug Eluting, Single Vessel, Coronary;  Surgeon: Keo Jenkins MD;  Location: [facility] CATH LAB/EP;  Service: Cardiology;;    STENT, DRUG ELUTING, SINGLE VESSEL, CORONARY  2023    Procedure: Stent, Drug Eluting, Single Vessel, Coronary;  Surgeon: Keo Jenkins MD;  Location: [facility] CATH LAB/EP;  Service: Cardiology;;  CX    TRANSESOPHAGEAL ECHOCARDIOGRAM WITH POSSIBLE CARDIOVERSION (JOSE W/ POSS CARDIOVERSION) N/A 2023    Procedure: Transesophageal echo (JOSE) intra-procedure log documentation;  Surgeon: Keo Jenkins MD;  Location: [facility] CATH LAB/EP;  Service: Cardiology;  Laterality: N/A;       Social History     Socioeconomic History    Marital status:    Occupational History     Employer: [employer]   Tobacco Use    Smoking status: Former     Current packs/day: 0.00     Types: Cigarettes     Quit date: 1999     Years since quittin.8    Smokeless tobacco: Never   Substance and Sexual Activity    Alcohol use: No     Alcohol/week: 0.0 standard drinks of alcohol    Drug use: No    Sexual activity: Yes     Partners: Female     Social Determinants of Health     Financial Resource Strain: Medium Risk (2023)    Overall Financial Resource Strain (CARDIA)     Difficulty of Paying Living Expenses: Somewhat hard   Food Insecurity: Food Insecurity Present (2023)    Hunger Vital Sign     Worried About Running Out of Food in the Last Year: Sometimes true     Ran Out of Food in the Last Year: Often true   Transportation Needs: No Transportation Needs (2023)     PRAPARE - Transportation     Lack of Transportation (Medical): No     Lack of Transportation (Non-Medical): No   Physical Activity: Insufficiently Active (11/28/2023)    Exercise Vital Sign     Days of Exercise per Week: 2 days     Minutes of Exercise per Session: 10 min   Stress: No Stress Concern Present (11/28/2023)    [employer] [place] [employer] - Occupational Stress Questionnaire     Feeling of Stress : Only a little   Social Connections: Unknown (11/28/2023)    Social Connection and Isolation Panel [NHANES]     Frequency of Communication with Friends and Family: More than three times a week     Frequency of Social Gatherings with Friends and Family: Never     Active Member of Clubs or Organizations: No     Attends Club or Organization Meetings: Never     Marital Status:    Recent Concern: Social Connections - Moderately Isolated (9/8/2023)    Social Connection and Isolation Panel [NHANES]     Frequency of Communication with Friends and Family: Three times a week     Frequency of Social Gatherings with Friends and Family: Never     Attends Buddhism Services: Never     Active Member of Clubs or Organizations: No     Attends Club or Organization Meetings: Patient declined     Marital Status:    Housing Stability: Low Risk  (11/28/2023)    Housing Stability Vital Sign     Unable to Pay for Housing in the Last Year: No     Number of Places Lived in the Last Year: 0     Unstable Housing in the Last Year: No   Recent Concern: Housing Stability - High Risk (9/8/2023)    Housing Stability Vital Sign     Unable to Pay for Housing in the Last Year: Yes     Number of Places Lived in the Last Year: 0     Unstable Housing in the Last Year: No       OBJECTIVE:     Vital Signs Range (Last 24H):  Temp:  [36.1 °C (97 °F)-37.2 °C (98.9 °F)]   Pulse:  [53-78]   Resp:  [16-20]   BP: (123-169)/(56-75)   SpO2:  [92 %-97 %]       CBC:   Recent Labs     02/08/24  0419 02/09/24  0236   WBC 5.26 5.91   RBC  2.90* 2.68*   HGB 8.3* 7.7*   HCT 24.2* 22.7*   * 127*   MCV 83 85   MCH 28.6 28.7   MCHC 34.3 33.9       CMP:   Recent Labs     02/08/24 0419 02/09/24  0236    138   K 3.8 4.0    101   CO2 23 29   BUN 59* 35*   CREATININE 6.6* 4.7*    97   MG 3.2* 2.3   PHOS 4.6* 4.1   CALCIUM 7.5* 7.5*   ALBUMIN 2.1* 2.0*   PROT 5.3* 5.2*   ALKPHOS 66 63   * 99*   AST 52* 47*   BILITOT 0.6 0.6       INR:  Recent Labs     02/08/24 0419   INR 1.1       Diagnostic Studies: No relevant studies.    EKG: No recent studies available.    2D ECHO:  No results found. However, due to the size of the patient record, not all encounters were searched. Please check Results Review for a complete set of results.      ASSESSMENT/PLAN:           Pre-op Assessment    I have reviewed the Patient Summary Reports.    I have reviewed the NPO Status.   I have reviewed the Medications.     Review of Systems  Anesthesia Hx:  No problems with previous Anesthesia                Cardiovascular:     Hypertension  Past MI CAD                                        Pulmonary:   COPD                     Renal/:  Chronic Renal Disease                Hepatic/GI:      Liver Disease,            Musculoskeletal:  Arthritis               Neurological:    Neuromuscular Disease,                                   Endocrine:   Hypothyroidism              Physical Exam  General: Alert and Cooperative    Airway:  Mallampati: III / II  Mouth Opening: Normal  TM Distance: Normal  Tongue: Normal  Neck ROM: Normal ROM    Chest/Lungs:  Normal Respiratory Rate    Heart:  Rate: Normal        Anesthesia Plan  Type of Anesthesia, risks & benefits discussed:    Anesthesia Type: Gen Natural Airway  Intra-op Monitoring Plan: Standard ASA Monitors  Post Op Pain Control Plan: multimodal analgesia  Induction:  IV  Airway Plan: Direct, Post-Induction  Informed Consent: Informed consent signed with the Patient and all parties understand the risks and agree  with anesthesia plan.  All questions answered.   ASA Score: 3  Day of Surgery Review of History & Physical: H&P Update referred to the surgeon/provider.    Ready For Surgery From Anesthesia Perspective.     .

## 2024-02-09 NOTE — NURSING
Patient is delivered into the care of second floor surgical staff. Report including details of the performed IR case is given to Dr. Chris Beck.

## 2024-02-09 NOTE — SEDATION DOCUMENTATION
IR procedure - Kidney Biopsy - is completed. Patient tolerated well. Vital signs are stable. Four cores are obtained, labeled, and prepared for transport to lab for ordered tests. Patient will be taken to the second floor surgery area for another planned procedure.

## 2024-02-10 NOTE — ANESTHESIA POSTPROCEDURE EVALUATION
Anesthesia Post Evaluation    Patient: Domingo Gross    Procedure(s) Performed: Procedure(s) (LRB):  INSERTION, CATHETER, HEMODIALYSIS, DUAL LUMEN (N/A)  REMOVAL, CATHETER, HEMODIALYSIS (Right)    Final Anesthesia Type: general      Patient location during evaluation: PACU  Patient participation: Yes- Able to Participate  Level of consciousness: awake and alert  Post-procedure vital signs: reviewed and stable  Pain management: adequate  Airway patency: patent  VERO mitigation strategies: Multimodal analgesia  PONV status at discharge: No PONV  Anesthetic complications: no      Cardiovascular status: hemodynamically stable  Respiratory status: spontaneous ventilation and room air  Hydration status: euvolemic  Follow-up not needed.              Vitals Value Taken Time   /87 02/09/24 1640   Temp 36.4 °C (97.5 °F) 02/09/24 1640   Pulse 60 02/09/24 1640   Resp 18 02/09/24 1640   SpO2 93 % 02/09/24 1124   Vitals shown include unvalidated device data.      Event Time   Out of Recovery 02/09/2024 11:24:31         Pain/Edmundo Score: Edmundo Score: 10 (2/9/2024 11:18 AM)

## 2024-02-10 NOTE — NURSING
Patient AAOx4, NAD noted, VSS.  Family at the bedside. IV removed. Tele box removed.  AVS given to patient.  VN to review. Safety maintained.  Work note given to patient

## 2024-02-10 NOTE — PROGRESS NOTES
Discharge orders noted. Additional clinical references attached. Patient's discharge instructions given by bedside RN and reviewed via this VN.  Education provided on new and previous medications, diagnosis, and follow-up appointments.  New medications delivered by pharmacy. Patient verbalized understanding and teach back method was used. Patient's ride/transportation home at bedside. All questions answered. Floor nurse notified.                02/09/24 9582    Notification    Notified Of Discharge Status   Admission   Communication Issues? None   Shift   Virtual Nurse - Patient Verbalized Approval Of Camera Use   Safety/Activity   Patient Rounds bed in low position;call light in patient/parent reach;clutter free environment maintained;visualized patient   Activity Management Sitting at edge of bed - L2

## 2024-02-10 NOTE — PLAN OF CARE
Problem: Adult Inpatient Plan of Care  Goal: Plan of Care Review  Outcome: Met  Goal: Patient-Specific Goal (Individualized)  Outcome: Met  Goal: Absence of Hospital-Acquired Illness or Injury  Outcome: Met  Goal: Optimal Comfort and Wellbeing  Outcome: Met  Goal: Readiness for Transition of Care  Outcome: Met     Problem: Fluid and Electrolyte Imbalance (Acute Kidney Injury/Impairment)  Goal: Fluid and Electrolyte Balance  Outcome: Met     Problem: Oral Intake Inadequate (Acute Kidney Injury/Impairment)  Goal: Optimal Nutrition Intake  Outcome: Met     Problem: Renal Function Impairment (Acute Kidney Injury/Impairment)  Goal: Effective Renal Function  Outcome: Met     Problem: Fluid Imbalance (Pneumonia)  Goal: Fluid Balance  Outcome: Met     Problem: Respiratory Compromise (Pneumonia)  Goal: Effective Oxygenation and Ventilation  Outcome: Met     Problem: Electrolyte Imbalance  Goal: Electrolyte Balance  Outcome: Met     Problem: COPD (Chronic Obstructive Pulmonary Disease) Comorbidity  Goal: Maintenance of COPD Symptom Control  Outcome: Met     Problem: Heart Failure Comorbidity  Goal: Maintenance of Heart Failure Symptom Control  Outcome: Met     Problem: Hypertension Comorbidity  Goal: Blood Pressure in Desired Range  Outcome: Met     Problem: Pain Chronic (Persistent) (Comorbidity Management)  Goal: Acceptable Pain Control and Functional Ability  Outcome: Met     Problem: Fall Injury Risk  Goal: Absence of Fall and Fall-Related Injury  Outcome: Met     Problem: Skin Injury Risk Increased  Goal: Skin Health and Integrity  Outcome: Met     Problem: Physical Therapy  Goal: Physical Therapy Goal  Outcome: Met     Problem: Occupational Therapy  Goal: Occupational Therapy Goal  Description: Goals to be met by: 1/31/2024     Patient will increase functional independence with ADLs by performing:  Will demonstrate performance of toilet transfer including lower body dressing and item retrieval. Met      Outcome: Met      Problem: Activity Intolerance (Pulmonary Impairment)  Goal: Improved Activity Tolerance  Outcome: Met     Problem: Airway Clearance Ineffective (Pulmonary Impairment)  Goal: Effective Airway Clearance  Outcome: Met     Problem: Gas Exchange Impaired (Pulmonary Impairment)  Goal: Optimal Gas Exchange  Outcome: Met     Problem: Infection  Goal: Absence of Infection Signs and Symptoms  Outcome: Met     Problem: Device-Related Complication Risk (Hemodialysis)  Goal: Safe, Effective Therapy Delivery  Outcome: Met     Problem: Hemodynamic Instability (Hemodialysis)  Goal: Effective Tissue Perfusion  Outcome: Met     Problem: Infection (Hemodialysis)  Goal: Absence of Infection Signs and Symptoms  Outcome: Met

## 2024-02-11 ENCOUNTER — PATIENT MESSAGE (OUTPATIENT)
Dept: SURGERY | Facility: CLINIC | Age: 64
End: 2024-02-11
Payer: COMMERCIAL

## 2024-02-11 ENCOUNTER — NURSE TRIAGE (OUTPATIENT)
Dept: ADMINISTRATIVE | Facility: CLINIC | Age: 64
End: 2024-02-11
Payer: COMMERCIAL

## 2024-02-11 NOTE — TELEPHONE ENCOUNTER
Pt states that he is post op biopsy on Friday. C/o pain to back where puncture site is 6/10 that is not improving with Lidocaine 5%. Chart reviewed and verified pt had hemodialysis cath placement. Pt states that he had biopsy as well. Dr. Davis called per protocol. Advised to come to ED if pain is worsening. Pt made aware. VU. Encounter routed to provider.    Reason for Disposition   [1] Caller has URGENT question AND [2] triager unable to answer question    Additional Information   Negative: Sounds like a life-threatening emergency to the triager   Negative: [1] Widespread rash AND [2] bright red, sunburn-like   Negative: [1] SEVERE headache AND [2] after spinal (epidural) anesthesia   Negative: [1] Vomiting AND [2] persists > 4 hours   Negative: [1] Vomiting AND [2] abdomen looks much more swollen than usual   Negative: [1] Drinking very little AND [2] dehydration suspected (e.g., no urine > 12 hours, very dry mouth, very lightheaded)   Negative: Patient sounds very sick or weak to the triager   Negative: Sounds like a serious complication to the triager   Negative: Fever > 100.4 F (38.0 C)   Negative: [1] SEVERE post-op pain (e.g., excruciating, pain scale 8-10) AND [2] not controlled with pain medications    Protocols used: Post-Op Symptoms and [id number]-V-[id number]

## 2024-02-12 ENCOUNTER — PATIENT OUTREACH (OUTPATIENT)
Dept: ADMINISTRATIVE | Facility: CLINIC | Age: 64
End: 2024-02-12
Payer: COMMERCIAL

## 2024-02-12 LAB
CRYOGLOB SER QL: NORMAL
HBV SURFACE AB SER QL IA: NEGATIVE
HBV SURFACE AB SERPL IA-ACNC: <3 MIU/ML

## 2024-02-12 NOTE — PROGRESS NOTES
C3 nurse spoke with Domingo Gross  for a TCC post hospital discharge follow up call. The patient does not have a scheduled HOSFU appointment with Analy Encarnacion MD  within 5-7 days post hospital discharge date 02/09/2024. C3 nurse was unable to schedule HOSFU appointment in [facility].  Please contact patient and schedule follow up appointment using HOSFU visit type on or before 02/16/2024.

## 2024-02-13 ENCOUNTER — HOSPITAL ENCOUNTER (EMERGENCY)
Facility: HOSPITAL | Age: 64
Discharge: HOME | End: 2024-02-13
Attending: EMERGENCY MEDICINE
Payer: COMMERCIAL

## 2024-02-13 VITALS
SYSTOLIC BLOOD PRESSURE: 142 MMHG | HEART RATE: 66 BPM | HEIGHT: 70 IN | RESPIRATION RATE: 19 BRPM | BODY MASS INDEX: 25.77 KG/M2 | WEIGHT: 180 LBS | DIASTOLIC BLOOD PRESSURE: 65 MMHG | OXYGEN SATURATION: 99 % | TEMPERATURE: 99 F

## 2024-02-13 DIAGNOSIS — R53.1 WEAKNESS: ICD-10-CM

## 2024-02-13 DIAGNOSIS — R55 PRE-SYNCOPE: Primary | ICD-10-CM

## 2024-02-13 LAB
ALBUMIN SERPL BCP-MCNC: 2 G/DL (ref 3.5–5.2)
ALP SERPL-CCNC: 58 U/L (ref 55–135)
ALT SERPL W/O P-5'-P-CCNC: 20 U/L (ref 10–44)
ANION GAP SERPL CALC-SCNC: 14 MMOL/L (ref 8–16)
AST SERPL-CCNC: 37 U/L (ref 10–40)
BACTERIA #/AREA URNS HPF: ABNORMAL /HPF
BASOPHILS # BLD AUTO: 0.03 K/UL (ref 0–0.2)
BASOPHILS NFR BLD: 0.3 % (ref 0–1.9)
BILIRUB SERPL-MCNC: 0.4 MG/DL (ref 0.1–1)
BILIRUB UR QL STRIP: NEGATIVE
BUN SERPL-MCNC: 52 MG/DL (ref 8–23)
CALCIUM SERPL-MCNC: 8.1 MG/DL (ref 8.7–10.5)
CHLORIDE SERPL-SCNC: 99 MMOL/L (ref 95–110)
CLARITY UR: CLEAR
CO2 SERPL-SCNC: 19 MMOL/L (ref 23–29)
COLOR UR: YELLOW
CREAT SERPL-MCNC: 8.7 MG/DL (ref 0.5–1.4)
DIFFERENTIAL METHOD BLD: ABNORMAL
EOSINOPHIL # BLD AUTO: 0 K/UL (ref 0–0.5)
EOSINOPHIL NFR BLD: 0.4 % (ref 0–8)
ERYTHROCYTE [DISTWIDTH] IN BLOOD BY AUTOMATED COUNT: 13.4 % (ref 11.5–14.5)
EST. GFR  (NO RACE VARIABLE): 6 ML/MIN/1.73 M^2
GLUCOSE SERPL-MCNC: 102 MG/DL (ref 70–110)
GLUCOSE UR QL STRIP: ABNORMAL
HCT VFR BLD AUTO: 21.9 % (ref 40–54)
HGB BLD-MCNC: 7.5 G/DL (ref 14–18)
HGB UR QL STRIP: ABNORMAL
HYALINE CASTS #/AREA URNS LPF: 0 /LPF
IMM GRANULOCYTES # BLD AUTO: 0.06 K/UL (ref 0–0.04)
IMM GRANULOCYTES NFR BLD AUTO: 0.6 % (ref 0–0.5)
KETONES UR QL STRIP: NEGATIVE
LEUKOCYTE ESTERASE UR QL STRIP: NEGATIVE
LYMPHOCYTES # BLD AUTO: 0.7 K/UL (ref 1–4.8)
LYMPHOCYTES NFR BLD: 7.4 % (ref 18–48)
MCH RBC QN AUTO: 29 PG (ref 27–31)
MCHC RBC AUTO-ENTMCNC: 34.2 G/DL (ref 32–36)
MCV RBC AUTO: 85 FL (ref 82–98)
MICROSCOPIC COMMENT: ABNORMAL
MONOCYTES # BLD AUTO: 1 K/UL (ref 0.3–1)
MONOCYTES NFR BLD: 10.1 % (ref 4–15)
NEUTROPHILS # BLD AUTO: 8 K/UL (ref 1.8–7.7)
NEUTROPHILS NFR BLD: 81.2 % (ref 38–73)
NITRITE UR QL STRIP: NEGATIVE
NRBC BLD-RTO: 0 /100 WBC
PH UR STRIP: 6 [PH] (ref 5–8)
PLATELET # BLD AUTO: 164 K/UL (ref 150–450)
PMV BLD AUTO: 11.6 FL (ref 9.2–12.9)
POTASSIUM SERPL-SCNC: 4.4 MMOL/L (ref 3.5–5.1)
PROT SERPL-MCNC: 5.8 G/DL (ref 6–8.4)
PROT UR QL STRIP: ABNORMAL
RBC # BLD AUTO: 2.59 M/UL (ref 4.6–6.2)
RBC #/AREA URNS HPF: 62 /HPF (ref 0–4)
SODIUM SERPL-SCNC: 132 MMOL/L (ref 136–145)
SP GR UR STRIP: 1.01 (ref 1–1.03)
TROPONIN I SERPL DL<=0.01 NG/ML-MCNC: 0.07 NG/ML (ref 0–0.03)
TROPONIN I SERPL DL<=0.01 NG/ML-MCNC: 0.08 NG/ML (ref 0–0.03)
UNIDENT CRYS URNS QL MICRO: 4
URN SPEC COLLECT METH UR: ABNORMAL
UROBILINOGEN UR STRIP-ACNC: NEGATIVE EU/DL
WBC # BLD AUTO: 9.8 K/UL (ref 3.9–12.7)
WBC #/AREA URNS HPF: 12 /HPF (ref 0–5)
YEAST URNS QL MICRO: ABNORMAL

## 2024-02-13 PROCEDURE — 81000 URINALYSIS NONAUTO W/SCOPE: CPT | Performed by: EMERGENCY MEDICINE

## 2024-02-13 PROCEDURE — 85025 COMPLETE CBC W/AUTO DIFF WBC: CPT | Performed by: EMERGENCY MEDICINE

## 2024-02-13 PROCEDURE — 80053 COMPREHEN METABOLIC PANEL: CPT | Performed by: EMERGENCY MEDICINE

## 2024-02-13 PROCEDURE — 96360 HYDRATION IV INFUSION INIT: CPT

## 2024-02-13 PROCEDURE — 99284 EMERGENCY DEPT VISIT MOD MDM: CPT | Mod: 25

## 2024-02-13 PROCEDURE — 93005 ELECTROCARDIOGRAM TRACING: CPT

## 2024-02-13 PROCEDURE — 87086 URINE CULTURE/COLONY COUNT: CPT | Performed by: EMERGENCY MEDICINE

## 2024-02-13 PROCEDURE — 84484 ASSAY OF TROPONIN QUANT: CPT | Performed by: EMERGENCY MEDICINE

## 2024-02-13 PROCEDURE — 93010 ELECTROCARDIOGRAM REPORT: CPT | Mod: ,,, | Performed by: INTERNAL MEDICINE

## 2024-02-13 PROCEDURE — 25000003 PHARM REV CODE 250: Performed by: EMERGENCY MEDICINE

## 2024-02-13 RX ADMIN — SODIUM CHLORIDE 1000 ML: 9 INJECTION, SOLUTION INTRAVENOUS at 07:02

## 2024-02-14 ENCOUNTER — PATIENT MESSAGE (OUTPATIENT)
Dept: INTERNAL MEDICINE | Facility: CLINIC | Age: 64
End: 2024-02-14
Payer: COMMERCIAL

## 2024-02-14 NOTE — ED NOTES
Patient received for discharge.  Patient is awake, alert, and oriented x 4.  Speech clear and appropriate.  RR regular and non labored.  Skin W/D/P.  Given written and verbal discharge instruction, with verbal understanding.  Patient given the opportunity to ask questions, with answers to meet needs.  No complaints or noted concerns.  No pain.

## 2024-02-14 NOTE — ED PROVIDER NOTES
Emergency Department Encounter  Provider Note  Encounter Date: 2/13/2024    Patient Name: Domingo Gross  MRN: [id number]    History of Present Illness   HPI  History of Present Illness:    Chief Complaint:   Chief Complaint   Patient presents with    Fall     Pt arrived via  EMS from [facility] after having a fall while shopping. Pt reports he got weak and fell. He denies any injury from the fall.         63-year-old male presenting with presyncope.  Patient states that he was feeling okay when he was at [facility] when his knees buckled and he slumped to the floor, no chest pain or shortness of breath.  Was recently hospitalized and his medications were adjusted.  Due for dialysis tomorrow.  Denies any fevers at home, had no idea that anything was wrong until today.    The following PMH/PSH/SocHx/FamHx has been reviewed by myself:    Past Medical History:   Diagnosis Date    Allergy     Anemia     Anticoagulant long-term use     Arthritis     CKD (chronic kidney disease) stage 4, GFR 15-29 ml/min     COPD (chronic obstructive pulmonary disease) 08/20/2021    Coronary artery disease     Heart attack 10/04/2019    Hematuria     Hemothorax     Hyperlipidemia     Hypertension     Hyperuricemia     Hypocalcemia     Hypokalemia     Hypophosphatemia     Hypothyroidism 3/2/2023    PAD (peripheral artery disease)     Proteinuria     Vitamin D deficiency      Past Surgical History:   Procedure Laterality Date    ABDOMINAL AORTOGRAPHY N/A 5/10/2019    Procedure: AORTOGRAM-ABDOMINAL;  Surgeon: Keo Jenkins MD;  Location: [facility] CATH LAB/EP;  Service: Cardiology;  Laterality: N/A;  RSFA intervention     AORTOGRAPHY WITH SERIALOGRAPHY N/A 12/3/2021    Procedure: AORTOGRAM, WITH SERIALOGRAPHY;  Surgeon: Keo Jenkins MD;  Location: [facility] CATH LAB/EP;  Service: Cardiology;  Laterality: N/A;    AORTOGRAPHY WITH SERIALOGRAPHY N/A 4/1/2022    Procedure: AORTOGRAM, WITH SERIALOGRAPHY;  Surgeon: Keo Jenkins MD;  Location: [facility] CATH  LAB/EP;  Service: Cardiology;  Laterality: N/A;    ATHERECTOMY, CORONARY N/A 4/25/2023    Procedure: Atherectomy-coronary;  Surgeon: Keo Jenkins MD;  Location: [facility] CATH LAB/EP;  Service: Cardiology;  Laterality: N/A;    BONE MARROW BIOPSY Right 10/12/2021    Procedure: BIOPSY-BONE MARROW;  Surgeon: Naseem Woods MD;  Location: [facility] OR;  Service: Oncology;  Laterality: Right;    CARDIAC CATHETERIZATION      CATARACT EXTRACTION      CATARACT EXTRACTION W/  INTRAOCULAR LENS IMPLANT Right 6/8/2020    Procedure: EXTRACTION, CATARACT, WITH IOL INSERTION;  Surgeon: Tin Light MD;  Location: [facility] OR;  Service: Ophthalmology;  Laterality: Right;    CATARACT EXTRACTION W/  INTRAOCULAR LENS IMPLANT Left 7/2/2020    Procedure: EXTRACTION, CATARACT, WITH IOL INSERTION;  Surgeon: Tin Light MD;  Location: [facility] OR;  Service: Ophthalmology;  Laterality: Left;    COLONOSCOPY N/A 1/6/2022    Procedure: COLONOSCOPY;  Surgeon: Kathe Penaloza MD;  Location: [address]);  Service: Endoscopy;  Laterality: N/A;  COVID test at [facility] on 1/3-GT  okay to hold Plavix for 5 days and aspirin per Dr. Becerra  2nd floor due toextensive cardiac history   instructions mailed and my [facility] portal -     CORONARY ANGIOGRAPHY N/A 3/29/2019    Procedure: ANGIOGRAM, CORONARY ARTERY;  Surgeon: Keo Jenkins MD;  Location: [facility] CATH LAB/EP;  Service: Cardiology;  Laterality: N/A;    CORONARY ANGIOGRAPHY Left 10/11/2019    Procedure: ANGIOGRAM, CORONARY ARTERY;  Surgeon: Keo Jenkins MD;  Location: [facility] CATH LAB/EP;  Service: Cardiology;  Laterality: Left;    CORONARY ANGIOGRAPHY N/A 4/10/2023    Procedure: ANGIOGRAM, CORONARY ARTERY;  Surgeon: Keo Jenkins MD;  Location: [facility] CATH LAB/EP;  Service: Cardiology;  Laterality: N/A;    CORONARY ANGIOPLASTY WITH STENT PLACEMENT  03/29/2019    mid and distal RCA    ESOPHAGOGASTRODUODENOSCOPY N/A 1/6/2022    Procedure: EGD (ESOPHAGOGASTRODUODENOSCOPY);  Surgeon: Kathe Penaloza  MD;  Location: [facility] ENDO ([facility] FLR);  Service: Endoscopy;  Laterality: N/A;    EYE SURGERY      INSTANTANEOUS WAVE-FREE RATIO (IFR) N/A 4/25/2023    Procedure: Instantaneous Wave-Free Ratio (IFR);  Surgeon: Keo Jenkins MD;  Location: [facility] CATH LAB/EP;  Service: Cardiology;  Laterality: N/A;    INTRAVASCULAR ULTRASOUND, NON-CORONARY  8/12/2022    Procedure: Intravascular Ultrasound, Non-Coronary;  Surgeon: Keo Jenkins MD;  Location: [facility] CATH LAB/EP;  Service: Cardiology;;    IVUS, CORONARY  4/25/2023    Procedure: IVUS, Coronary;  Surgeon: Keo Jenkins MD;  Location: [facility] CATH LAB/EP;  Service: Cardiology;;    IVUS, CORONARY  5/16/2023    Procedure: IVUS, Coronary;  Surgeon: Keo Jenkins MD;  Location: [facility] CATH LAB/EP;  Service: Cardiology;;  CX    LEFT HEART CATHETERIZATION N/A 3/29/2019    Procedure: Left heart cath;  Surgeon: Keo Jenkins MD;  Location: [facility] CATH LAB/EP;  Service: Cardiology;  Laterality: N/A;    LEFT HEART CATHETERIZATION Left 10/8/2019    Procedure: Left heart cath;  Surgeon: Keo Jenkins MD;  Location: [facility] CATH LAB/EP;  Service: Cardiology;  Laterality: Left;    LEFT HEART CATHETERIZATION Left 4/10/2023    Procedure: Left heart cath;  Surgeon: Keo Jenkins MD;  Location: [facility] CATH LAB/EP;  Service: Cardiology;  Laterality: Left;    LEFT HEART CATHETERIZATION Left 4/25/2023    Procedure: Left heart cath;  Surgeon: Keo Jenkins MD;  Location: [facility] CATH LAB/EP;  Service: Cardiology;  Laterality: Left;    LEFT HEART CATHETERIZATION Left 5/16/2023    Procedure: Left heart cath;  Surgeon: Keo Jenkins MD;  Location: [facility] CATH LAB/EP;  Service: Cardiology;  Laterality: Left;    PERCUTANEOUS TRANSLUMINAL ANGIOPLASTY (PTA) OF PERIPHERAL VESSEL N/A 7/12/2019    Procedure: PTA, PERIPHERAL VESSEL;  Surgeon: Keo Jenkins MD;  Location: [facility] CATH LAB/EP;  Service: Cardiology;  Laterality: N/A;    PERCUTANEOUS TRANSLUMINAL ANGIOPLASTY (PTA) OF PERIPHERAL VESSEL Left 5/20/2022     Procedure: PTA, PERIPHERAL VESSEL;  Surgeon: Keo Jenkins MD;  Location: [facility] CATH LAB/EP;  Service: Cardiology;  Laterality: Left;    PERCUTANEOUS TRANSLUMINAL ANGIOPLASTY (PTA) OF PERIPHERAL VESSEL Right 2022    Procedure: PTA, PERIPHERAL VESSEL;  Surgeon: Keo Jenkins MD;  Location: [facility] CATH LAB/EP;  Service: Cardiology;  Laterality: Right;    PERCUTANEOUS TRANSLUMINAL BALLOON ANGIOPLASTY OF CORONARY ARTERY  2023    Procedure: Angioplasty-coronary;  Surgeon: Keo Jenkins MD;  Location: [facility] CATH LAB/EP;  Service: Cardiology;;    PTCA, SINGLE VESSEL  2023    Procedure: PTCA, Single Vessel;  Surgeon: Keo Jenkins MD;  Location: [facility] CATH LAB/EP;  Service: Cardiology;;  CX    STENT, DRUG ELUTING, SINGLE VESSEL, CORONARY  2023    Procedure: Stent, Drug Eluting, Single Vessel, Coronary;  Surgeon: Keo Jenkins MD;  Location: [facility] CATH LAB/EP;  Service: Cardiology;;    STENT, DRUG ELUTING, SINGLE VESSEL, CORONARY  2023    Procedure: Stent, Drug Eluting, Single Vessel, Coronary;  Surgeon: Keo Jenkins MD;  Location: [facility] CATH LAB/EP;  Service: Cardiology;;  CX    TRANSESOPHAGEAL ECHOCARDIOGRAM WITH POSSIBLE CARDIOVERSION (JOSE W/ POSS CARDIOVERSION) N/A 2023    Procedure: Transesophageal echo (JOSE) intra-procedure log documentation;  Surgeon: Keo Jenkins MD;  Location: [facility] CATH LAB/EP;  Service: Cardiology;  Laterality: N/A;     Social History     Tobacco Use    Smoking status: Former     Current packs/day: 0.00     Types: Cigarettes     Quit date: 1999     Years since quittin.8    Smokeless tobacco: Never   Substance Use Topics    Alcohol use: No     Alcohol/week: 0.0 standard drinks of alcohol    Drug use: No     Family History   Problem Relation Age of Onset    Aneurysm Mother     Hypertension Mother     Heart disease Mother     Cancer Father     Diabetes Sister     Hypertension Sister     No Known Problems Brother     No Known Problems Son         Allergies reviewed:  Review of patient's allergies indicates:   Allergen Reactions    Ace inhibitors Rash       Medications reviewed:  Medication List with Changes/Refills   Current Medications    ALBUTEROL (PROVENTIL/VENTOLIN HFA) 90 MCG/ACTUATION INHALER    INHALE 2 PUFFS INTO THE LUNGS EVERY 6 HOURS AS NEEDED FOR WHEEZING    ALBUTEROL-IPRATROPIUM (DUO-NEB) 2.5 MG-0.5 MG/3 ML NEBULIZER SOLUTION    Take 3 mLs by nebulization every 6 (six) hours as needed for Wheezing or Shortness of Breath (or cough). Rescue    AMLODIPINE (NORVASC) 5 MG TABLET    Take 1 tablet (5 mg total) by mouth once daily.    APIXABAN (ELIQUIS) 5 MG TAB    Take 1 tablet (5 mg total) by mouth 2 (two) times daily.    CARVEDILOL (COREG) 25 MG TABLET    Take 1 tablet (25 mg total) by mouth 2 (two) times daily with meals.    CLOPIDOGREL (PLAVIX) 75 MG TABLET    Take 1 tablet (75 mg total) by mouth once daily.    COENZYME Q10 100 MG CAPSULE    Take 100 mg by mouth once daily.    EPLERENONE (INSPRA) 50 MG TAB    Take 1 tablet (50 mg total) by mouth once daily.    HYDRALAZINE (APRESOLINE) 50 MG TABLET    Take 1 tablet (50 mg total) by mouth every 8 (eight) hours.    HYDROXYZINE PAMOATE (VISTARIL) 25 MG CAP    Take 1 capsule (25 mg total) by mouth nightly as needed (Insomnia/Anxiety).    ISOSORBIDE MONONITRATE (IMDUR) 30 MG 24 HR TABLET    Take 1 tablet (30 mg total) by mouth once daily.    LEVOTHYROXINE (SYNTHROID) 75 MCG TABLET    Take 1 tablet (75 mcg total) by mouth before breakfast.    LIDOCAINE (LIDODERM) 5 %    Place 1 patch onto the skin daily as needed (back pain). Remove & Discard patch within 12 hours or as directed by MD    NITROGLYCERIN (NITROSTAT) 0.4 MG SL TABLET    Place 0.4 mg under the tongue every 5 (five) minutes as needed for Chest pain.    RANOLAZINE (RANEXA) 500 MG TB12    Take 1 tablet (500 mg total) by mouth 2 (two) times daily.    ROSUVASTATIN (CRESTOR) 40 MG TAB    Take 1 tablet (40 mg total) by mouth every evening.     SODIUM BICARBONATE 650 MG TABLET    Take 1 tablet (650 mg total) by mouth once daily. for 30 doses         Physical Exam   Physical Exam    Initial Vitals   BP Pulse Resp Temp SpO2   02/13/24 1752 02/13/24 1752 02/13/24 1934 02/13/24 1752 02/13/24 1752   (!) 97/56 62 16 98.4 °F (36.9 °C) 98 %      MAP       --                  Triage vital signs reviewed.    Constitutional:   Thin, well-developed. Not in acute distress.  HENT: Normocephalic, atraumatic. Moist mucous membranes.  Eyes: No conjunctival injection.  Resp: Normal respiratory effort, breathing unlabored.  Cardio: Regular rate and rhythm.  GI: Abdomen non-distended.  No abdominal pain with palpation.  MSK: Extremities without any deformities noted. No lower extremity edema.  Skin: Warm and dry. No rashes or lesions noted.  Neuro: Awake and alert. Moves all extremities.    ED Course   Procedures    Medical Decision Making    History Acquisition     The history is provided by the patient.   Assessment requiring an independent historian and why historian was needed:   Wife at bedside supplementing history    Review of prior external/non ED notes:  discharge summary 02/09/2024: Patient admitted for elevated LFTs, BRETT, pneumonia, started on dialysis  Cardiac cath 5/16/23:   Crossed LCX/OM stent struts into LCX for modified [place]                 Dilatation with 2.0, 2.5, and 3.0 mm balloons                Stented with 3.0 x 20 mm Synergy REZA post dilated with 3.5 NC                Balloon angioplasty of OM with 3.0 x 12 mm balloon                Proximal LCX post dilatation with 3.5 x 8 mm balloon     Differential Diagnoses   Based on available information and initial assessment, the working Differential Diagnosis includes, but is not limited to:  Arrhythmia, aortic dissection, MI/unstable angina, PE, cardiogenic shock, CHF, CVA/TIA, intracranial lesion/mass, seizure, perforated viscous, ruptured AAA, orthostatic hypotension, vasovagal episode, anemia,  dehydration, medication reaction, intentional overdose  .    EKG   EKG ordered and independently reviewed by me:    Normal sinus rhythm, rate 63, left axis, left bundle, no STEMI    Labs   Lab tests ordered and independently reviewed by me:    Labs Reviewed   CBC W/ AUTO DIFFERENTIAL - Abnormal; Notable for the following components:       Result Value    RBC 2.59 (*)     Hemoglobin 7.5 (*)     Hematocrit 21.9 (*)     Immature Granulocytes 0.6 (*)     Gran # (ANC) 8.0 (*)     Immature Grans (Abs) 0.06 (*)     Lymph # 0.7 (*)     Gran % 81.2 (*)     Lymph % 7.4 (*)     All other components within normal limits   COMPREHENSIVE METABOLIC PANEL - Abnormal; Notable for the following components:    Sodium 132 (*)     CO2 19 (*)     BUN 52 (*)     Creatinine 8.7 (*)     Calcium 8.1 (*)     Total Protein 5.8 (*)     Albumin 2.0 (*)     eGFR 6 (*)     All other components within normal limits   TROPONIN I - Abnormal; Notable for the following components:    Troponin I 0.080 (*)     All other components within normal limits   URINALYSIS, REFLEX TO URINE CULTURE - Abnormal; Notable for the following components:    Protein, UA 2+ (*)     Glucose, UA Trace (*)     Occult Blood UA 1+ (*)     All other components within normal limits    Narrative:     Specimen Source->Urine   URINALYSIS MICROSCOPIC - Abnormal; Notable for the following components:    RBC, UA 62 (*)     WBC, UA 12 (*)     Yeast, UA Rare (*)     All other components within normal limits    Narrative:     Specimen Source->Urine   TROPONIN I - Abnormal; Notable for the following components:    Troponin I 0.067 (*)     All other components within normal limits   CULTURE, URINE       Imaging   Imaging ordered and independently reviewed by me:   Imaging Results    None            Additional Consideration   Domingo Gross  presents to the Emergency Department today with  presyncope.  Initially hypotensive upon arrival, no signs of sepsis.  Labs, EKG, disposition  pending.  Patient states that he feels completely normal   Would like to go home.    Additional testing considered but not indicated during the course of this workup: further imaging and labwork, not indicated  Co-morbidities/chronic illness/exacerbation of chronic illness considered which impacted clinical decision making:  CKD, AFib  Procedures done in the ED or plan for the OR: No  Social determinants of care considered during development of treatment plan include: Decreased medical literacy  Discussion of management or test interpretation with external provider: No  DNR discussion: No    The patient's list of active medications and allergies as documented per RN staff has been reviewed.  Medications given in the ED and/or prescribed:   Medications   sodium chloride 0.9% bolus 1,000 mL 1,000 mL (0 mLs Intravenous Stopped 2/13/24 2034)             ED Course as of 02/13/24 2209 Tue Feb 13, 2024 2111 CBC auto differential(!)  Independently interpreted by me; unremarkable or consistent with the patient's baseline   [CS]   2111 Comprehensive metabolic panel(!)   Elevated BUN creatinine, patient due for dialysis tomorrow [CS]   2111 Urinalysis, Reflex to Urine Culture Urine, Clean Catch(!)  Independently interpreted by me; unremarkable or consistent with the patient's baseline   [CS]   2111 Urinalysis Microscopic(!)  No signs of infx [CS]   2111 Troponin I(!): 0.080  Positive. Pt w/o chest pain. [CS]   2112   Told patient that his troponin was positive, and that he should stay in the hospital.  He is reluctant.  Will obtain 2nd troponin, if up trending, will admit, if downtrending, will re-evaluate [CS]   2207  Repeat troponin downtrending.  Patient is still without symptoms.  Would like to go home.  Counseled on following up with PCP, will go to dialysis tomorrow and strict return precautions discussed. [CS]      ED Course User Index  [CS] Latia Perkins MD       Explanation of disposition: Discharge    Clinical  Impression:     1. Pre-syncope    2. Weakness         All results from the workup were reviewed with the patient/family in detail. I discussed with the patient and/or the family/caregiver that today's evaluation in the ED does not suggest any emergent or life-threatening medical conditions that would require hospitalization or immediate intervention beyond what was provided in the ED. I believe the patient is safe for discharge.  However, a reassuring evaluation in the ED does not preclude the presence or development of a serious or life-threatening condition. As such, strict return precautions were discussed with the patient expressing understanding of instructions, and all questions answered. The patient/family communicates understanding of all this information and all remaining questions and concerns were addressed at this time.    The patient/family has been provided with verbal and printed direction regarding our final diagnosis(es) as well as instructions regarding use of OTC and/or Rx medications intended to manage the patient's aforementioned conditions including:  ED Prescriptions    None       The patient's condition does not warrant review of the  and prescription of controlled substances.      ED Disposition Condition    Discharge Stable               Latia Perkins MD  02/13/24 7875

## 2024-02-14 NOTE — DISCHARGE INSTRUCTIONS
Your workup showed that your troponin level, which comes from your heart, was elevated, but is now trending down.  Please follow-up with your doctor and go to dialysis as scheduled tomorrow.  If you have any recurrent symptoms, you need to come back immediately to the hospital.

## 2024-02-14 NOTE — ED NOTES
"Pt care assumed. Pt BIB EMS s/p fall at [facility]. Per pt, he was feeling weak and fell. Pt had kidney biopsy ~1wk ago and this evening was the first time he has been "out and moving around". Pt hypotensive on arrival. BP currently 149/67. GCS15, AAOx4  "

## 2024-02-15 ENCOUNTER — HOSPITAL ENCOUNTER (OUTPATIENT)
Facility: HOSPITAL | Age: 64
Discharge: HOME | End: 2024-02-17
Attending: EMERGENCY MEDICINE | Admitting: HOSPITALIST
Payer: COMMERCIAL

## 2024-02-15 DIAGNOSIS — M54.9 DORSALGIA, UNSPECIFIED: ICD-10-CM

## 2024-02-15 DIAGNOSIS — D63.1 ANEMIA DUE TO STAGE 4 CHRONIC KIDNEY DISEASE: ICD-10-CM

## 2024-02-15 DIAGNOSIS — N17.9 AKI (ACUTE KIDNEY INJURY): ICD-10-CM

## 2024-02-15 DIAGNOSIS — M54.9 BACK PAIN, UNSPECIFIED BACK LOCATION, UNSPECIFIED BACK PAIN LATERALITY, UNSPECIFIED CHRONICITY: ICD-10-CM
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
## 2024-02-15 DIAGNOSIS — N18.4 ANEMIA DUE TO STAGE 4 CHRONIC KIDNEY DISEASE: ICD-10-CM

## 2024-02-15 DIAGNOSIS — S32.009A CLOSED FRACTURE OF TRANSVERSE PROCESS OF LUMBAR VERTEBRA, INITIAL ENCOUNTER: ICD-10-CM

## 2024-02-15 DIAGNOSIS — N18.6 ESRD (END STAGE RENAL DISEASE): Primary | ICD-10-CM

## 2024-02-15 DIAGNOSIS — R52 INTRACTABLE PAIN: ICD-10-CM

## 2024-02-15 DIAGNOSIS — R07.9 CHEST PAIN: ICD-10-CM

## 2024-02-15 DIAGNOSIS — M48.07 SPINAL STENOSIS, LUMBOSACRAL REGION: ICD-10-CM

## 2024-02-15 LAB
ALBUMIN SERPL BCP-MCNC: 2 G/DL (ref 3.5–5.2)
ALP SERPL-CCNC: 62 U/L (ref 55–135)
ALT SERPL W/O P-5'-P-CCNC: 32 U/L (ref 10–44)
ANION GAP SERPL CALC-SCNC: 15 MMOL/L (ref 8–16)
AST SERPL-CCNC: 78 U/L (ref 10–40)
BACTERIA UR CULT: NO GROWTH
BASOPHILS # BLD AUTO: 0.02 K/UL (ref 0–0.2)
BASOPHILS NFR BLD: 0.2 % (ref 0–1.9)
BILIRUB SERPL-MCNC: 0.5 MG/DL (ref 0.1–1)
BUN SERPL-MCNC: 63 MG/DL (ref 8–23)
CALCIUM SERPL-MCNC: 8.3 MG/DL (ref 8.7–10.5)
CHLORIDE SERPL-SCNC: 99 MMOL/L (ref 95–110)
CO2 SERPL-SCNC: 18 MMOL/L (ref 23–29)
CREAT SERPL-MCNC: 9.4 MG/DL (ref 0.5–1.4)
DIFFERENTIAL METHOD BLD: ABNORMAL
EOSINOPHIL # BLD AUTO: 0 K/UL (ref 0–0.5)
EOSINOPHIL NFR BLD: 0 % (ref 0–8)
ERYTHROCYTE [DISTWIDTH] IN BLOOD BY AUTOMATED COUNT: 13.2 % (ref 11.5–14.5)
EST. GFR  (NO RACE VARIABLE): 6 ML/MIN/1.73 M^2
GLUCOSE SERPL-MCNC: 103 MG/DL (ref 70–110)
HCT VFR BLD AUTO: 21.6 % (ref 40–54)
HGB BLD-MCNC: 7.2 G/DL (ref 14–18)
IMM GRANULOCYTES # BLD AUTO: 0.03 K/UL (ref 0–0.04)
IMM GRANULOCYTES NFR BLD AUTO: 0.4 % (ref 0–0.5)
LYMPHOCYTES # BLD AUTO: 0.7 K/UL (ref 1–4.8)
LYMPHOCYTES NFR BLD: 8.1 % (ref 18–48)
MAGNESIUM SERPL-MCNC: 1.9 MG/DL (ref 1.6–2.6)
MCH RBC QN AUTO: 28.6 PG (ref 27–31)
MCHC RBC AUTO-ENTMCNC: 33.3 G/DL (ref 32–36)
MCV RBC AUTO: 86 FL (ref 82–98)
MONOCYTES # BLD AUTO: 0.9 K/UL (ref 0.3–1)
MONOCYTES NFR BLD: 10.5 % (ref 4–15)
NEUTROPHILS # BLD AUTO: 6.8 K/UL (ref 1.8–7.7)
NEUTROPHILS NFR BLD: 80.8 % (ref 38–73)
NRBC BLD-RTO: 0 /100 WBC
OHS QRS DURATION: 154 MS
OHS QRS DURATION: 174 MS
OHS QTC CALCULATION: 536 MS
OHS QTC CALCULATION: 550 MS
PHOSPHATE SERPL-MCNC: 6.9 MG/DL (ref 2.7–4.5)
PLATELET # BLD AUTO: 202 K/UL (ref 150–450)
PMV BLD AUTO: 10.9 FL (ref 9.2–12.9)
POTASSIUM SERPL-SCNC: 4 MMOL/L (ref 3.5–5.1)
PROT SERPL-MCNC: 6.1 G/DL (ref 6–8.4)
RBC # BLD AUTO: 2.52 M/UL (ref 4.6–6.2)
SODIUM SERPL-SCNC: 132 MMOL/L (ref 136–145)
WBC # BLD AUTO: 8.47 K/UL (ref 3.9–12.7)

## 2024-02-15 PROCEDURE — 85025 COMPLETE CBC W/AUTO DIFF WBC: CPT | Performed by: EMERGENCY MEDICINE

## 2024-02-15 PROCEDURE — 80053 COMPREHEN METABOLIC PANEL: CPT | Performed by: EMERGENCY MEDICINE

## 2024-02-15 PROCEDURE — 83520 IMMUNOASSAY QUANT NOS NONAB: CPT | Performed by: INTERNAL MEDICINE

## 2024-02-15 PROCEDURE — 63600175 PHARM REV CODE 636 W HCPCS: Mod: JZ,EC,JG | Performed by: INTERNAL MEDICINE

## 2024-02-15 PROCEDURE — 93005 ELECTROCARDIOGRAM TRACING: CPT

## 2024-02-15 PROCEDURE — 25500020 PHARM REV CODE 255: Performed by: EMERGENCY MEDICINE

## 2024-02-15 PROCEDURE — 93010 ELECTROCARDIOGRAM REPORT: CPT | Mod: ,,, | Performed by: INTERNAL MEDICINE

## 2024-02-15 PROCEDURE — 96374 THER/PROPH/DIAG INJ IV PUSH: CPT | Mod: 59

## 2024-02-15 PROCEDURE — G0257 UNSCHED DIALYSIS ESRD PT HOS: HCPCS

## 2024-02-15 PROCEDURE — 84100 ASSAY OF PHOSPHORUS: CPT | Performed by: EMERGENCY MEDICINE

## 2024-02-15 PROCEDURE — 96375 TX/PRO/DX INJ NEW DRUG ADDON: CPT | Mod: 59

## 2024-02-15 PROCEDURE — 80100014 HC HEMODIALYSIS 1:1

## 2024-02-15 PROCEDURE — 99285 EMERGENCY DEPT VISIT HI MDM: CPT | Mod: 25

## 2024-02-15 PROCEDURE — 83735 ASSAY OF MAGNESIUM: CPT | Performed by: EMERGENCY MEDICINE

## 2024-02-15 PROCEDURE — 63600175 PHARM REV CODE 636 W HCPCS: Performed by: EMERGENCY MEDICINE

## 2024-02-15 RX ORDER — SODIUM CHLORIDE 9 MG/ML
INJECTION, SOLUTION INTRAVENOUS ONCE
Status: DISCONTINUED | OUTPATIENT
Start: 2024-02-15 | End: 2024-02-17 | Stop reason: HOSPADM

## 2024-02-15 RX ORDER — MORPHINE SULFATE 4 MG/ML
4 INJECTION, SOLUTION INTRAMUSCULAR; INTRAVENOUS
Status: COMPLETED | OUTPATIENT
Start: 2024-02-15 | End: 2024-02-15

## 2024-02-15 RX ORDER — ONDANSETRON HYDROCHLORIDE 2 MG/ML
4 INJECTION, SOLUTION INTRAVENOUS
Status: COMPLETED | OUTPATIENT
Start: 2024-02-15 | End: 2024-02-15

## 2024-02-15 RX ORDER — SODIUM CHLORIDE 9 MG/ML
INJECTION, SOLUTION INTRAVENOUS
Status: DISCONTINUED | OUTPATIENT
Start: 2024-02-15 | End: 2024-02-17 | Stop reason: HOSPADM

## 2024-02-15 RX ORDER — METHYLPREDNISOLONE SOD SUCC 125 MG
125 VIAL (EA) INJECTION ONCE
Status: DISCONTINUED | OUTPATIENT
Start: 2024-02-15 | End: 2024-02-17 | Stop reason: HOSPADM

## 2024-02-15 RX ADMIN — EPOETIN ALFA-EPBX 10000 UNITS: 10000 INJECTION, SOLUTION INTRAVENOUS; SUBCUTANEOUS at 09:02

## 2024-02-15 RX ADMIN — MORPHINE SULFATE 4 MG: 4 INJECTION INTRAVENOUS at 01:02

## 2024-02-15 RX ADMIN — ONDANSETRON 4 MG: 2 INJECTION INTRAMUSCULAR; INTRAVENOUS at 01:02

## 2024-02-15 RX ADMIN — IOHEXOL 150 ML: 350 INJECTION, SOLUTION INTRAVENOUS at 11:02

## 2024-02-15 NOTE — ED TRIAGE NOTES
Low back pain radiating down both legs since Tuesday after a slip and fall. Denies taking OTC meds for pain relief. States he had assistance getting up yesterday but missed dialysis due to pain. Today pain is worse and is having trouble getting around. Missed dialysis yesterday due to pain.

## 2024-02-15 NOTE — CONSULTS
NEPHROLOGY CONSULT NOTE    HPI & INTERVAL HISTORY:    Past Medical History:   Diagnosis Date    Allergy     Anemia     Anticoagulant long-term use     Arthritis     CKD (chronic kidney disease) stage 4, GFR 15-29 ml/min     COPD (chronic obstructive pulmonary disease) 08/20/2021    Coronary artery disease     Heart attack 10/04/2019    Hematuria     Hemothorax     Hyperlipidemia     Hypertension     Hyperuricemia     Hypocalcemia     Hypokalemia     Hypophosphatemia     Hypothyroidism 3/2/2023    PAD (peripheral artery disease)     Proteinuria     Vitamin D deficiency       Past Surgical History:   Procedure Laterality Date    ABDOMINAL AORTOGRAPHY N/A 5/10/2019    Procedure: AORTOGRAM-ABDOMINAL;  Surgeon: Keo Jenkins MD;  Location: [facility] CATH LAB/EP;  Service: Cardiology;  Laterality: N/A;  RSFA intervention     AORTOGRAPHY WITH SERIALOGRAPHY N/A 12/3/2021    Procedure: AORTOGRAM, WITH SERIALOGRAPHY;  Surgeon: Keo Jenkins MD;  Location: [facility] CATH LAB/EP;  Service: Cardiology;  Laterality: N/A;    AORTOGRAPHY WITH SERIALOGRAPHY N/A 4/1/2022    Procedure: AORTOGRAM, WITH SERIALOGRAPHY;  Surgeon: Keo Jenkins MD;  Location: [facility] CATH LAB/EP;  Service: Cardiology;  Laterality: N/A;    ATHERECTOMY, CORONARY N/A 4/25/2023    Procedure: Atherectomy-coronary;  Surgeon: Keo Jenkins MD;  Location: [facility] CATH LAB/EP;  Service: Cardiology;  Laterality: N/A;    BONE MARROW BIOPSY Right 10/12/2021    Procedure: BIOPSY-BONE MARROW;  Surgeon: Naseem Woods MD;  Location: [facility] OR;  Service: Oncology;  Laterality: Right;    CARDIAC CATHETERIZATION      CATARACT EXTRACTION      CATARACT EXTRACTION W/  INTRAOCULAR LENS IMPLANT Right 6/8/2020    Procedure: EXTRACTION, CATARACT, WITH IOL INSERTION;  Surgeon: Tin Light MD;  Location: [facility] [place] OR;  Service: Ophthalmology;  Laterality: Right;    CATARACT EXTRACTION W/  INTRAOCULAR LENS IMPLANT Left 7/2/2020    Procedure: EXTRACTION, CATARACT, WITH IOL INSERTION;  Surgeon:  Tin Light MD;  Location: [facility] OR;  Service: Ophthalmology;  Laterality: Left;    COLONOSCOPY N/A 1/6/2022    Procedure: COLONOSCOPY;  Surgeon: Kathe Penaloza MD;  Location: [facility] ENDO (2ND FLR);  Service: Endoscopy;  Laterality: N/A;  COVID test at [facility] on 1/3-GT  okay to hold Plavix for 5 days and aspirin per Dr. Becerra  2nd floor due toextensive cardiac history   instructions mailed and my [facility] portal -     CORONARY ANGIOGRAPHY N/A 3/29/2019    Procedure: ANGIOGRAM, CORONARY ARTERY;  Surgeon: Keo Jenkins MD;  Location: [facility] CATH LAB/EP;  Service: Cardiology;  Laterality: N/A;    CORONARY ANGIOGRAPHY Left 10/11/2019    Procedure: ANGIOGRAM, CORONARY ARTERY;  Surgeon: Keo Jenkins MD;  Location: [facility] CATH LAB/EP;  Service: Cardiology;  Laterality: Left;    CORONARY ANGIOGRAPHY N/A 4/10/2023    Procedure: ANGIOGRAM, CORONARY ARTERY;  Surgeon: Keo Jenkins MD;  Location: [facility] CATH LAB/EP;  Service: Cardiology;  Laterality: N/A;    CORONARY ANGIOPLASTY WITH STENT PLACEMENT  03/29/2019    mid and distal RCA    ESOPHAGOGASTRODUODENOSCOPY N/A 1/6/2022    Procedure: EGD (ESOPHAGOGASTRODUODENOSCOPY);  Surgeon: Kathe Penaloza MD;  Location: [facility] (2ND FLR);  Service: Endoscopy;  Laterality: N/A;    EYE SURGERY      INSERTION OF TUNNELED CENTRAL VENOUS HEMODIALYSIS CATHETER N/A 2/9/2024    Procedure: INSERTION, CATHETER, HEMODIALYSIS, DUAL LUMEN;  Surgeon: Wang Mendieta MD;  Location: [facility] OR;  Service: General;  Laterality: N/A;    INSTANTANEOUS WAVE-FREE RATIO (IFR) N/A 4/25/2023    Procedure: Instantaneous Wave-Free Ratio (IFR);  Surgeon: Keo Jenkins MD;  Location: [facility] CATH LAB/EP;  Service: Cardiology;  Laterality: N/A;    INTRAVASCULAR ULTRASOUND, NON-CORONARY  8/12/2022    Procedure: Intravascular Ultrasound, Non-Coronary;  Surgeon: Keo Jenkins MD;  Location: [facility] CATH LAB/EP;  Service: Cardiology;;    IVUS, CORONARY  4/25/2023    Procedure: IVUS, Coronary;  Surgeon: Keo KYLE  MD Gregory;  Location: [facility] CATH LAB/EP;  Service: Cardiology;;    IVUS, CORONARY  5/16/2023    Procedure: IVUS, Coronary;  Surgeon: Keo Jenkins MD;  Location: [facility] CATH LAB/EP;  Service: Cardiology;;  CX    LEFT HEART CATHETERIZATION N/A 3/29/2019    Procedure: Left heart cath;  Surgeon: Keo Jenkins MD;  Location: [facility] CATH LAB/EP;  Service: Cardiology;  Laterality: N/A;    LEFT HEART CATHETERIZATION Left 10/8/2019    Procedure: Left heart cath;  Surgeon: Keo Jenkins MD;  Location: [facility] CATH LAB/EP;  Service: Cardiology;  Laterality: Left;    LEFT HEART CATHETERIZATION Left 4/10/2023    Procedure: Left heart cath;  Surgeon: Keo Jenkins MD;  Location: [facility] CATH LAB/EP;  Service: Cardiology;  Laterality: Left;    LEFT HEART CATHETERIZATION Left 4/25/2023    Procedure: Left heart cath;  Surgeon: Keo Jenkins MD;  Location: [facility] CATH LAB/EP;  Service: Cardiology;  Laterality: Left;    LEFT HEART CATHETERIZATION Left 5/16/2023    Procedure: Left heart cath;  Surgeon: Keo Jenkins MD;  Location: [facility] CATH LAB/EP;  Service: Cardiology;  Laterality: Left;    PERCUTANEOUS TRANSLUMINAL ANGIOPLASTY (PTA) OF PERIPHERAL VESSEL N/A 7/12/2019    Procedure: PTA, PERIPHERAL VESSEL;  Surgeon: Keo Jenkins MD;  Location: [facility] CATH LAB/EP;  Service: Cardiology;  Laterality: N/A;    PERCUTANEOUS TRANSLUMINAL ANGIOPLASTY (PTA) OF PERIPHERAL VESSEL Left 5/20/2022    Procedure: PTA, PERIPHERAL VESSEL;  Surgeon: Keo Jenkins MD;  Location: [facility] CATH LAB/EP;  Service: Cardiology;  Laterality: Left;    PERCUTANEOUS TRANSLUMINAL ANGIOPLASTY (PTA) OF PERIPHERAL VESSEL Right 8/12/2022    Procedure: PTA, PERIPHERAL VESSEL;  Surgeon: Keo Jenkins MD;  Location: [facility] CATH LAB/EP;  Service: Cardiology;  Laterality: Right;    PERCUTANEOUS TRANSLUMINAL BALLOON ANGIOPLASTY OF CORONARY ARTERY  4/25/2023    Procedure: Angioplasty-coronary;  Surgeon: Keo Jenkins MD;  Location: [facility] CATH LAB/EP;  Service: Cardiology;;     PTCA, SINGLE VESSEL  2023    Procedure: PTCA, Single Vessel;  Surgeon: Keo Jenkins MD;  Location: [facility] CATH LAB/EP;  Service: Cardiology;;  CX    REMOVAL OF HEMODIALYSIS CATHETER Right 2024    Procedure: REMOVAL, CATHETER, HEMODIALYSIS;  Surgeon: Wang Mendieta MD;  Location: [facility] OR;  Service: General;  Laterality: Right;    STENT, DRUG ELUTING, SINGLE VESSEL, CORONARY  2023    Procedure: Stent, Drug Eluting, Single Vessel, Coronary;  Surgeon: Keo Jenkins MD;  Location: [facility] CATH LAB/EP;  Service: Cardiology;;    STENT, DRUG ELUTING, SINGLE VESSEL, CORONARY  2023    Procedure: Stent, Drug Eluting, Single Vessel, Coronary;  Surgeon: Keo Jenkins MD;  Location: [facility] CATH LAB/EP;  Service: Cardiology;;  CX    TRANSESOPHAGEAL ECHOCARDIOGRAM WITH POSSIBLE CARDIOVERSION (JOSE W/ POSS CARDIOVERSION) N/A 2023    Procedure: Transesophageal echo (JOSE) intra-procedure log documentation;  Surgeon: Keo Jenkins MD;  Location: [facility] CATH LAB/EP;  Service: Cardiology;  Laterality: N/A;      Review of patient's allergies indicates:   Allergen Reactions    Ace inhibitors Rash      (Not in a hospital admission)      Social History     Socioeconomic History    Marital status:    Occupational History     Employer: [employer]   Tobacco Use    Smoking status: Former     Current packs/day: 0.00     Types: Cigarettes     Quit date: 1999     Years since quittin.8    Smokeless tobacco: Never   Substance and Sexual Activity    Alcohol use: No     Alcohol/week: 0.0 standard drinks of alcohol    Drug use: No    Sexual activity: Yes     Partners: Female     Social Determinants of Health     Financial Resource Strain: Medium Risk (2023)    Overall Financial Resource Strain (CARDIA)     Difficulty of Paying Living Expenses: Somewhat hard   Food Insecurity: Food Insecurity Present (2023)    Hunger Vital Sign     Worried About Running Out of Food in the Last Year: Sometimes  true     Ran Out of Food in the Last Year: Often true   Transportation Needs: No Transportation Needs (11/28/2023)    PRAPARE - Transportation     Lack of Transportation (Medical): No     Lack of Transportation (Non-Medical): No   Physical Activity: Insufficiently Active (11/28/2023)    Exercise Vital Sign     Days of Exercise per Week: 2 days     Minutes of Exercise per Session: 10 min   Stress: No Stress Concern Present (11/28/2023)    [employer] [place] [employer] - Occupational Stress Questionnaire     Feeling of Stress : Only a little   Social Connections: Unknown (11/28/2023)    Social Connection and Isolation Panel [NHANES]     Frequency of Communication with Friends and Family: More than three times a week     Frequency of Social Gatherings with Friends and Family: Never     Active Member of Clubs or Organizations: No     Attends Club or Organization Meetings: Never     Marital Status:    Recent Concern: Social Connections - Moderately Isolated (9/8/2023)    Social Connection and Isolation Panel [NHANES]     Frequency of Communication with Friends and Family: Three times a week     Frequency of Social Gatherings with Friends and Family: Never     Attends Church Services: Never     Active Member of Clubs or Organizations: No     Attends Club or Organization Meetings: Patient declined     Marital Status:    Housing Stability: Low Risk  (11/28/2023)    Housing Stability Vital Sign     Unable to Pay for Housing in the Last Year: No     Number of Places Lived in the Last Year: 0     Unstable Housing in the Last Year: No   Recent Concern: Housing Stability - High Risk (9/8/2023)    Housing Stability Vital Sign     Unable to Pay for Housing in the Last Year: Yes     Number of Places Lived in the Last Year: 0     Unstable Housing in the Last Year: No        MEDS   sodium chloride 0.9%   Intravenous Once               CONTINOUS INFUSIONS:    No intake or output data in the 24 hours ending  "02/15/24 1550     HEMODYNAMICS:    Temp:  [96.5 °F (35.8 °C)] 96.5 °F (35.8 °C)  Pulse:  [65-70] 65  Resp:  [15-18] 18  SpO2:  [97 %-98 %] 97 %  BP: (151-154)/(71-84) 154/71   General :    Severe back pain   No fever   No chills   No CP   No SOB   No cough   No abdominal pain   No diarrhea  Cardiology  : pulse 65  Pulmonary : RR 18  Pulse oximeter 97 % O2  Abdomen soft   Extremities edema:   Skin:dry   LABS   Lab Results   Component Value Date    WBC 8.47 02/15/2024    HGB 7.2 (L) 02/15/2024    HCT 21.6 (L) 02/15/2024    MCV 86 02/15/2024     02/15/2024        Recent Labs   Lab 02/15/24  1106      CALCIUM 8.3*   ALBUMIN 2.0*   PROT 6.1   *   K 4.0   CO2 18*   CL 99   BUN 63*   CREATININE 9.4*   ALKPHOS 62   ALT 32   AST 78*   BILITOT 0.5      Lab Results   Component Value Date    PTH 60.6 08/11/2021    CALCIUM 8.3 (L) 02/15/2024    CAION 1.31 07/14/2020    PHOS 6.9 (H) 02/15/2024      Lab Results   Component Value Date    IRON 60 03/14/2023    TIBC 299 03/14/2023    FERRITIN 191 03/14/2023        ABG  No results for input(s): "PH", "PO2", "PCO2", "HCO3", "BE" in the last 168 hours.      IMAGING:  CXR    ASSESSMENT / PLAN   BRETT /CKD 4  Probably CKD 5 now  Kidney biopsy 2/9/24  Proteinuria   Not able to use ACEi (allergy )  Right IJ tunneled catheter  Hyponatremia 132  K 4  Metabolic acidosis  Metabolic bone disease  Hyperphosphatemia  Anemia multifactorial  Hb 7.2  Poor nutrition  Albumin 2  Hypertension  Blood pressure 154/71  Dialysis  "

## 2024-02-15 NOTE — ED PROVIDER NOTES
Encounter Date: 2/15/2024       History     Chief Complaint   Patient presents with    Back Pain     Reports slip and fall at [facility] on Tuesday. Since then with c/o lower back pain radiating down both legs. States yesterday was able to sit up and ambulate with assistance but today pain was too bad. Denies taking OTC meds for pain relief. Presents awake, alert. States he missed dialysis yesterday due to pain. Last dialysis on Saturday. No distress noted.      Patient is a 63-year-old male with a history of end-stage renal disease who said while in [place] 2 days ago his legs became weak causing him to slide down the counter onto the floor.  He denies head trauma or loss of consciousness.  He was unable to go to dialysis that day.  His last dialysis was 5 days ago.  He began to experience pain to his lower back yesterday.  No numbness.  No bowel incontinence.  He denies chest pain or shortness of breath.  No fever or cough.      Review of patient's allergies indicates:   Allergen Reactions    Ace inhibitors Rash     Past Medical History:   Diagnosis Date    Allergy     Anemia     Anticoagulant long-term use     Arthritis     CKD (chronic kidney disease) stage 4, GFR 15-29 ml/min     COPD (chronic obstructive pulmonary disease) 08/20/2021    Coronary artery disease     Heart attack 10/04/2019    Hematuria     Hemothorax     Hyperlipidemia     Hypertension     Hyperuricemia     Hypocalcemia     Hypokalemia     Hypophosphatemia     Hypothyroidism 3/2/2023    PAD (peripheral artery disease)     Proteinuria     Vitamin D deficiency      Past Surgical History:   Procedure Laterality Date    ABDOMINAL AORTOGRAPHY N/A 5/10/2019    Procedure: AORTOGRAM-ABDOMINAL;  Surgeon: Keo Jenkins MD;  Location: [facility] CATH LAB/EP;  Service: Cardiology;  Laterality: N/A;  RSFA intervention     AORTOGRAPHY WITH SERIALOGRAPHY N/A 12/3/2021    Procedure: AORTOGRAM, WITH SERIALOGRAPHY;  Surgeon: Keo Jenkins MD;  Location: [facility] CATH  LAB/EP;  Service: Cardiology;  Laterality: N/A;    AORTOGRAPHY WITH SERIALOGRAPHY N/A 4/1/2022    Procedure: AORTOGRAM, WITH SERIALOGRAPHY;  Surgeon: Keo Jenkins MD;  Location: [facility] CATH LAB/EP;  Service: Cardiology;  Laterality: N/A;    ATHERECTOMY, CORONARY N/A 4/25/2023    Procedure: Atherectomy-coronary;  Surgeon: Keo Jenkins MD;  Location: [facility] CATH LAB/EP;  Service: Cardiology;  Laterality: N/A;    BONE MARROW BIOPSY Right 10/12/2021    Procedure: BIOPSY-BONE MARROW;  Surgeon: Naseem Woods MD;  Location: [facility] OR;  Service: Oncology;  Laterality: Right;    CARDIAC CATHETERIZATION      CATARACT EXTRACTION      CATARACT EXTRACTION W/  INTRAOCULAR LENS IMPLANT Right 6/8/2020    Procedure: EXTRACTION, CATARACT, WITH IOL INSERTION;  Surgeon: Tin Light MD;  [facility];  Service: Ophthalmology;  Laterality: Right;    CATARACT EXTRACTION W/  INTRAOCULAR LENS IMPLANT Left 7/2/2020    Procedure: EXTRACTION, CATARACT, WITH IOL INSERTION;  Surgeon: Tin Light MD;  Location: [facility];  Service: Ophthalmology;  Laterality: Left;    COLONOSCOPY N/A 1/6/2022    Procedure: COLONOSCOPY;  Surgeon: Kathe Penaloza MD;  Location: [address]);  Service: Endoscopy;  Laterality: N/A;  COVID test at [facility] on 1/3-GT  okay to hold Plavix for 5 days and aspirin per Dr. Becerra  2nd floor due toextensive cardiac history   instructions mailed and my [facility] portal -     CORONARY ANGIOGRAPHY N/A 3/29/2019    Procedure: ANGIOGRAM, CORONARY ARTERY;  Surgeon: Keo Jenkins MD;  Location: [facility] CATH LAB/EP;  Service: Cardiology;  Laterality: N/A;    CORONARY ANGIOGRAPHY Left 10/11/2019    Procedure: ANGIOGRAM, CORONARY ARTERY;  Surgeon: Keo Jenkins MD;  Location: [facility] CATH LAB/EP;  Service: Cardiology;  Laterality: Left;    CORONARY ANGIOGRAPHY N/A 4/10/2023    Procedure: ANGIOGRAM, CORONARY ARTERY;  Surgeon: Keo Jenkins MD;  Location: [facility] CATH LAB/EP;  Service: Cardiology;  Laterality: N/A;     CORONARY ANGIOPLASTY WITH STENT PLACEMENT  03/29/2019    mid and distal RCA    ESOPHAGOGASTRODUODENOSCOPY N/A 1/6/2022    Procedure: EGD (ESOPHAGOGASTRODUODENOSCOPY);  Surgeon: Kathe Penaloza MD;  Location: [address]);  Service: Endoscopy;  Laterality: N/A;    EYE SURGERY      INSERTION OF TUNNELED CENTRAL VENOUS HEMODIALYSIS CATHETER N/A 2/9/2024    Procedure: INSERTION, CATHETER, HEMODIALYSIS, DUAL LUMEN;  Surgeon: Wang Mendieta MD;  Location: [facility] OR;  Service: General;  Laterality: N/A;    INSTANTANEOUS WAVE-FREE RATIO (IFR) N/A 4/25/2023    Procedure: Instantaneous Wave-Free Ratio (IFR);  Surgeon: Keo Jenkins MD;  Location: [facility] CATH LAB/EP;  Service: Cardiology;  Laterality: N/A;    INTRAVASCULAR ULTRASOUND, NON-CORONARY  8/12/2022    Procedure: Intravascular Ultrasound, Non-Coronary;  Surgeon: Keo Jenkins MD;  Location: [facility] CATH LAB/EP;  Service: Cardiology;;    IVUS, CORONARY  4/25/2023    Procedure: IVUS, Coronary;  Surgeon: Keo Jenkins MD;  Location: [facility] CATH LAB/EP;  Service: Cardiology;;    IVUS, CORONARY  5/16/2023    Procedure: IVUS, Coronary;  Surgeon: Keo Jenkins MD;  Location: [facility] CATH LAB/EP;  Service: Cardiology;;  CX    LEFT HEART CATHETERIZATION N/A 3/29/2019    Procedure: Left heart cath;  Surgeon: Keo Jenkins MD;  Location: [facility] CATH LAB/EP;  Service: Cardiology;  Laterality: N/A;    LEFT HEART CATHETERIZATION Left 10/8/2019    Procedure: Left heart cath;  Surgeon: Keo Jenkins MD;  Location: [facility] CATH LAB/EP;  Service: Cardiology;  Laterality: Left;    LEFT HEART CATHETERIZATION Left 4/10/2023    Procedure: Left heart cath;  Surgeon: Keo Jenkins MD;  Location: [facility] CATH LAB/EP;  Service: Cardiology;  Laterality: Left;    LEFT HEART CATHETERIZATION Left 4/25/2023    Procedure: Left heart cath;  Surgeon: Keo Jenkins MD;  Location: [facility] CATH LAB/EP;  Service: Cardiology;  Laterality: Left;    LEFT HEART CATHETERIZATION Left 5/16/2023    Procedure:  Left heart cath;  Surgeon: Keo Jenkins MD;  Location: [facility] CATH LAB/EP;  Service: Cardiology;  Laterality: Left;    PERCUTANEOUS TRANSLUMINAL ANGIOPLASTY (PTA) OF PERIPHERAL VESSEL N/A 7/12/2019    Procedure: PTA, PERIPHERAL VESSEL;  Surgeon: Keo Jenkins MD;  Location: [facility] CATH LAB/EP;  Service: Cardiology;  Laterality: N/A;    PERCUTANEOUS TRANSLUMINAL ANGIOPLASTY (PTA) OF PERIPHERAL VESSEL Left 5/20/2022    Procedure: PTA, PERIPHERAL VESSEL;  Surgeon: Keo Jenkins MD;  Location: [facility] CATH LAB/EP;  Service: Cardiology;  Laterality: Left;    PERCUTANEOUS TRANSLUMINAL ANGIOPLASTY (PTA) OF PERIPHERAL VESSEL Right 8/12/2022    Procedure: PTA, PERIPHERAL VESSEL;  Surgeon: Keo Jenkins MD;  Location: [facility] CATH LAB/EP;  Service: Cardiology;  Laterality: Right;    PERCUTANEOUS TRANSLUMINAL BALLOON ANGIOPLASTY OF CORONARY ARTERY  4/25/2023    Procedure: Angioplasty-coronary;  Surgeon: Keo Jenkins MD;  Location: [facility] CATH LAB/EP;  Service: Cardiology;;    PTCA, SINGLE VESSEL  5/16/2023    Procedure: PTCA, Single Vessel;  Surgeon: Keo Jenkins MD;  Location: [facility] CATH LAB/EP;  Service: Cardiology;;  CX    REMOVAL OF HEMODIALYSIS CATHETER Right 2/9/2024    Procedure: REMOVAL, CATHETER, HEMODIALYSIS;  Surgeon: Wang Mendieta MD;  Location: [facility] OR;  Service: General;  Laterality: Right;    STENT, DRUG ELUTING, SINGLE VESSEL, CORONARY  4/25/2023    Procedure: Stent, Drug Eluting, Single Vessel, Coronary;  Surgeon: Keo Jenkins MD;  Location: [facility] CATH LAB/EP;  Service: Cardiology;;    STENT, DRUG ELUTING, SINGLE VESSEL, CORONARY  5/16/2023    Procedure: Stent, Drug Eluting, Single Vessel, Coronary;  Surgeon: Keo Jenkins MD;  Location: [facility] CATH LAB/EP;  Service: Cardiology;;  CX    TRANSESOPHAGEAL ECHOCARDIOGRAM WITH POSSIBLE CARDIOVERSION (OJSE W/ POSS CARDIOVERSION) N/A 6/19/2023    Procedure: Transesophageal echo (JOSE) intra-procedure log documentation;  Surgeon: Keo Jenkins MD;  Location:  [facility] CATH LAB/EP;  Service: Cardiology;  Laterality: N/A;     Family History   Problem Relation Age of Onset    Aneurysm Mother     Hypertension Mother     Heart disease Mother     Cancer Father     Diabetes Sister     Hypertension Sister     No Known Problems Brother     No Known Problems Son      Social History     Tobacco Use    Smoking status: Former     Current packs/day: 0.00     Types: Cigarettes     Quit date: 1999     Years since quittin.8    Smokeless tobacco: Never   Substance Use Topics    Alcohol use: No     Alcohol/week: 0.0 standard drinks of alcohol    Drug use: No     Review of Systems   Constitutional:  Negative for fever.   Respiratory:  Negative for shortness of breath.    Cardiovascular:  Negative for chest pain.   Gastrointestinal:  Negative for abdominal pain, nausea and vomiting.   Musculoskeletal:  Positive for back pain.       Physical Exam     Initial Vitals [02/15/24 0927]   BP Pulse Resp Temp SpO2   (!) 151/84 70 15 96.5 °F (35.8 °C) 98 %      MAP       --         Physical Exam    Nursing note and vitals reviewed.  Constitutional: No distress.   HENT:   Head: Atraumatic.   Eyes: Conjunctivae and EOM are normal.   Neck: Neck supple.   Cardiovascular:  Normal rate, regular rhythm and normal heart sounds.           Pulmonary/Chest: Breath sounds normal.   Abdominal: Abdomen is soft. There is no abdominal tenderness.   Musculoskeletal:      Cervical back: Neck supple.      Comments: Trace edema of the lower extremities bilaterally.  Lower back pain elicited with manipulation of both legs.  Tenderness of the lower lumbar spine.     Neurological: He is alert and oriented to person, place, and time.   Skin: Skin is warm and dry.   Psychiatric: Thought content normal.         ED Course   Procedures  Labs Reviewed   CBC W/ AUTO DIFFERENTIAL - Abnormal; Notable for the following components:       Result Value    RBC 2.52 (*)     Hemoglobin 7.2 (*)     Hematocrit 21.6 (*)     Lymph # 0.7  (*)     Gran % 80.8 (*)     Lymph % 8.1 (*)     All other components within normal limits   COMPREHENSIVE METABOLIC PANEL - Abnormal; Notable for the following components:    Sodium 132 (*)     CO2 18 (*)     BUN 63 (*)     Creatinine 9.4 (*)     Calcium 8.3 (*)     Albumin 2.0 (*)     AST 78 (*)     eGFR 6 (*)     All other components within normal limits   PHOSPHORUS - Abnormal; Notable for the following components:    Phosphorus 6.9 (*)     All other components within normal limits   MAGNESIUM     EKG Readings: (Independently Interpreted)   Initial Reading: No STEMI. Rhythm: Normal Sinus Rhythm. Heart Rate: 68. Conduction: LBBB.     ECG Results              EKG 12-lead (In process)        Collection Time Result Time QRS Duration OHS QTC Calculation    02/15/24 10:22:00 02/15/24 10:36:57 174 550                     In process by Interface, Lab In [facility] (02/15/24 10:37:06)                   Narrative:    Test Reason : N18.6,    Vent. Rate : 068 BPM     Atrial Rate : 068 BPM     P-R Int : 206 ms          QRS Dur : 174 ms      QT Int : 518 ms       P-R-T Axes : 098 -24 085 degrees     QTc Int : 550 ms    Normal sinus rhythm  Left bundle branch block  Abnormal ECG  When compared with ECG of 13-FEB-2024 19:36,  No significant change was found    Referred By: [email or web address]   SELF           Confirmed By:                                   Imaging Results              X-Ray Lumbar Spine Ap And Lateral (Final result)  Result time 02/15/24 11:31:45      Final result by Valente Park MD (02/15/24 11:31:45)                   Impression:      Minimal multilevel vertebral body height loss, presumably related to degenerative endplate changes.  No acute displaced compression fracture deformity with significant height loss.      Electronically signed by: Valente Park MD  Date:    02/15/2024  Time:    11:31               Narrative:    EXAMINATION:  XR LUMBAR SPINE AP AND LATERAL    CLINICAL HISTORY:  fall;    TECHNIQUE:  AP,  "lateral and spot images were performed of the lumbar spine.    COMPARISON:  None.    FINDINGS:  Exam quality is slightly limited by motion blur.  Grossly normal sagittal curvature and alignment.  Minimal multilevel vertebral body height loss likely related to degenerative endplate changes.  No acute displaced fracture with significant height loss identified.  Moderate degenerative changes in the lumbar spine, without severe disc space height loss.  Aortoiliac atherosclerotic calcifications with aortoiliac stent.                                       X-Ray Chest 1 View (Final result)  Result time 02/15/24 11:24:25      Final result by Valente Park MD (02/15/24 11:24:25)                   Impression:      No detrimental change when compared with recent prior studies.      Electronically signed by: Valente Park MD  Date:    02/15/2024  Time:    11:24               Narrative:    EXAMINATION:  XR CHEST 1 VIEW    CLINICAL HISTORY:  Provided history is "ESRD;  ".    TECHNIQUE:  One view of the chest.    COMPARISON:  02/03/2024 and 01/31/2024.    FINDINGS:  Cardiomediastinal silhouette is prominent and similar to the prior study.  There are diffuse bilateral interstitial opacities with overall similar appearance when compared with multiple prior studies, potentially related to chronic interstitial changes and/or interstitial edema.  Questionable trace bilateral pleural effusions.  No confluent area of consolidation.  No large pleural effusion.  No distinct pneumothorax.  Right-sided central venous catheter overlies the upper SVC.  Surgical clips overlie the chest wall as seen previously.                                       Medications   0.9%  NaCl infusion (has no administration in time range)   0.9%  NaCl infusion (has no administration in time range)   sodium chloride 0.9% bolus 250 mL 250 mL (has no administration in time range)   morphine injection 4 mg (4 mg Intravenous Given 2/15/24 1318)   ondansetron " injection 4 mg (4 mg Intravenous Given 2/15/24 1320)     Medical Decision Making  Emergent evaluation of a 63-year-old male with end-stage renal disease who complains of back pain.  Patient fell 2 days ago and was seen in this ED afterwards.  He did not go to dialysis 2 days ago and is due today.  Lab work shows a normal potassium and a systolic blood pressure in the 150s with good oxygen saturations.  I have discussed this with the patient's nephrologist, Dr. Payne, who will order dialysis for him through the ED. He will return to the emergency department afterwards and if stable, will be discharged.    Amount and/or Complexity of Data Reviewed  Labs: ordered.     Details: CBC with a hemoglobin of 7.2 hematocrit of 21.6.  CMP with a BUN of 63 and creatinine of 9.4.  Potassium is normal.  Radiology: ordered.     Details: Chest x-ray without acute changes.    Risk  Prescription drug management.                                      Clinical Impression:  Final diagnoses:  [N18.6] ESRD (end stage renal disease) (Primary)  [W19.XXXA] Fall, initial encounter  [M54.9] Back pain, unspecified back location, unspecified back pain laterality, unspecified chronicity                 Sanju Packer MD  02/16/24 [id number]

## 2024-02-16 PROBLEM — D62 ABLA (ACUTE BLOOD LOSS ANEMIA): Status: ACTIVE | Noted: 2024-02-16

## 2024-02-16 PROBLEM — S32.009A CLOSED FRACTURE OF TRANSVERSE PROCESS OF LUMBAR VERTEBRA: Status: ACTIVE | Noted: 2024-02-16
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
## 2024-02-16 PROBLEM — N18.6 ESRD (END STAGE RENAL DISEASE): Status: ACTIVE | Noted: 2024-02-16

## 2024-02-16 LAB
ABO + RH BLD: NORMAL
ANION GAP SERPL CALC-SCNC: 14 MMOL/L (ref 8–16)
BASOPHILS # BLD AUTO: 0.03 K/UL (ref 0–0.2)
BASOPHILS NFR BLD: 0.4 % (ref 0–1.9)
BLD GP AB SCN CELLS X3 SERPL QL: NORMAL
BLD PROD TYP BPU: NORMAL
BLOOD UNIT EXPIRATION DATE: NORMAL
BLOOD UNIT TYPE CODE: 600
BLOOD UNIT TYPE: NORMAL
BUN SERPL-MCNC: 38 MG/DL (ref 8–23)
CALCIUM SERPL-MCNC: 7.8 MG/DL (ref 8.7–10.5)
CHLORIDE SERPL-SCNC: 99 MMOL/L (ref 95–110)
CO2 SERPL-SCNC: 22 MMOL/L (ref 23–29)
CODING SYSTEM: NORMAL
CREAT SERPL-MCNC: 6.3 MG/DL (ref 0.5–1.4)
CROSSMATCH INTERPRETATION: NORMAL
DIFFERENTIAL METHOD BLD: ABNORMAL
DISPENSE STATUS: NORMAL
EOSINOPHIL # BLD AUTO: 0 K/UL (ref 0–0.5)
EOSINOPHIL NFR BLD: 0.5 % (ref 0–8)
ERYTHROCYTE [DISTWIDTH] IN BLOOD BY AUTOMATED COUNT: 13.2 % (ref 11.5–14.5)
EST. GFR  (NO RACE VARIABLE): 9 ML/MIN/1.73 M^2
FINAL PATHOLOGIC DIAGNOSIS: NORMAL
GLUCOSE SERPL-MCNC: 95 MG/DL (ref 70–110)
GROSS: NORMAL
HCT VFR BLD AUTO: 18.6 % (ref 40–54)
HGB BLD-MCNC: 6.2 G/DL (ref 14–18)
IMM GRANULOCYTES # BLD AUTO: 0.03 K/UL (ref 0–0.04)
IMM GRANULOCYTES NFR BLD AUTO: 0.4 % (ref 0–0.5)
LYMPHOCYTES # BLD AUTO: 1 K/UL (ref 1–4.8)
LYMPHOCYTES NFR BLD: 13.7 % (ref 18–48)
Lab: NORMAL
MAGNESIUM SERPL-MCNC: 1.7 MG/DL (ref 1.6–2.6)
MCH RBC QN AUTO: 28.4 PG (ref 27–31)
MCHC RBC AUTO-ENTMCNC: 33.3 G/DL (ref 32–36)
MCV RBC AUTO: 85 FL (ref 82–98)
MONOCYTES # BLD AUTO: 1.1 K/UL (ref 0.3–1)
MONOCYTES NFR BLD: 15.2 % (ref 4–15)
NEUTROPHILS # BLD AUTO: 5.1 K/UL (ref 1.8–7.7)
NEUTROPHILS NFR BLD: 69.8 % (ref 38–73)
NRBC BLD-RTO: 0 /100 WBC
NUM UNITS TRANS PACKED RBC: NORMAL
PHOSPHATE SERPL-MCNC: 5.3 MG/DL (ref 2.7–4.5)
PLATELET # BLD AUTO: 182 K/UL (ref 150–450)
PMV BLD AUTO: 10.6 FL (ref 9.2–12.9)
POCT GLUCOSE: 123 MG/DL (ref 70–110)
POTASSIUM SERPL-SCNC: 4.1 MMOL/L (ref 3.5–5.1)
RBC # BLD AUTO: 2.18 M/UL (ref 4.6–6.2)
SODIUM SERPL-SCNC: 135 MMOL/L (ref 136–145)
SPECIMEN OUTDATE: NORMAL
WBC # BLD AUTO: 7.31 K/UL (ref 3.9–12.7)

## 2024-02-16 PROCEDURE — 80100016 HC MAINTENANCE HEMODIALYSIS

## 2024-02-16 PROCEDURE — 84100 ASSAY OF PHOSPHORUS: CPT | Performed by: REGISTERED NURSE

## 2024-02-16 PROCEDURE — G0257 UNSCHED DIALYSIS ESRD PT HOS: HCPCS

## 2024-02-16 PROCEDURE — 80048 BASIC METABOLIC PNL TOTAL CA: CPT | Performed by: REGISTERED NURSE

## 2024-02-16 PROCEDURE — G0378 HOSPITAL OBSERVATION PER HR: HCPCS

## 2024-02-16 PROCEDURE — 96372 THER/PROPH/DIAG INJ SC/IM: CPT | Performed by: INTERNAL MEDICINE

## 2024-02-16 PROCEDURE — 94761 N-INVAS EAR/PLS OXIMETRY MLT: CPT

## 2024-02-16 PROCEDURE — 63600175 PHARM REV CODE 636 W HCPCS: Performed by: EMERGENCY MEDICINE

## 2024-02-16 PROCEDURE — 63600175 PHARM REV CODE 636 W HCPCS: Mod: JZ,JG | Performed by: INTERNAL MEDICINE

## 2024-02-16 PROCEDURE — P9016 RBC LEUKOCYTES REDUCED: HCPCS | Performed by: INTERNAL MEDICINE

## 2024-02-16 PROCEDURE — 63600175 PHARM REV CODE 636 W HCPCS: Performed by: INTERNAL MEDICINE

## 2024-02-16 PROCEDURE — 83735 ASSAY OF MAGNESIUM: CPT | Performed by: REGISTERED NURSE

## 2024-02-16 PROCEDURE — 96376 TX/PRO/DX INJ SAME DRUG ADON: CPT

## 2024-02-16 PROCEDURE — 36415 COLL VENOUS BLD VENIPUNCTURE: CPT | Performed by: INTERNAL MEDICINE

## 2024-02-16 PROCEDURE — 36415 COLL VENOUS BLD VENIPUNCTURE: CPT | Mod: XB | Performed by: REGISTERED NURSE

## 2024-02-16 PROCEDURE — 94799 UNLISTED PULMONARY SVC/PX: CPT

## 2024-02-16 PROCEDURE — 86901 BLOOD TYPING SEROLOGIC RH(D): CPT | Performed by: INTERNAL MEDICINE

## 2024-02-16 PROCEDURE — 86920 COMPATIBILITY TEST SPIN: CPT | Performed by: INTERNAL MEDICINE

## 2024-02-16 PROCEDURE — 99900035 HC TECH TIME PER 15 MIN (STAT)

## 2024-02-16 PROCEDURE — 85025 COMPLETE CBC W/AUTO DIFF WBC: CPT | Performed by: REGISTERED NURSE

## 2024-02-16 PROCEDURE — 25000003 PHARM REV CODE 250: Performed by: REGISTERED NURSE

## 2024-02-16 RX ORDER — AMLODIPINE BESYLATE 5 MG/1
5 TABLET ORAL DAILY
Status: DISCONTINUED | OUTPATIENT
Start: 2024-02-16 | End: 2024-02-17 | Stop reason: HOSPADM

## 2024-02-16 RX ORDER — SODIUM BICARBONATE 650 MG/1
650 TABLET ORAL DAILY
Status: DISCONTINUED | OUTPATIENT
Start: 2024-02-16 | End: 2024-02-16

## 2024-02-16 RX ORDER — ONDANSETRON HYDROCHLORIDE 2 MG/ML
4 INJECTION, SOLUTION INTRAVENOUS EVERY 8 HOURS PRN
Status: DISCONTINUED | OUTPATIENT
Start: 2024-02-16 | End: 2024-02-17 | Stop reason: HOSPADM

## 2024-02-16 RX ORDER — HYDROCODONE BITARTRATE AND ACETAMINOPHEN 5; 325 MG/1; MG/1
1 TABLET ORAL EVERY 6 HOURS PRN
Status: DISCONTINUED | OUTPATIENT
Start: 2024-02-16 | End: 2024-02-17 | Stop reason: HOSPADM

## 2024-02-16 RX ORDER — SODIUM CHLORIDE 0.9 % (FLUSH) 0.9 %
10 SYRINGE (ML) INJECTION EVERY 12 HOURS PRN
Status: DISCONTINUED | OUTPATIENT
Start: 2024-02-16 | End: 2024-02-17 | Stop reason: HOSPADM

## 2024-02-16 RX ORDER — HEPARIN SODIUM 1000 [USP'U]/ML
5000 INJECTION, SOLUTION INTRAVENOUS; SUBCUTANEOUS
Status: DISCONTINUED | OUTPATIENT
Start: 2024-02-16 | End: 2024-02-17 | Stop reason: HOSPADM

## 2024-02-16 RX ORDER — CLOPIDOGREL BISULFATE 75 MG/1
75 TABLET ORAL DAILY
Status: DISCONTINUED | OUTPATIENT
Start: 2024-02-16 | End: 2024-02-16

## 2024-02-16 RX ORDER — CARVEDILOL 25 MG/1
25 TABLET ORAL 2 TIMES DAILY WITH MEALS
Status: DISCONTINUED | OUTPATIENT
Start: 2024-02-16 | End: 2024-02-17 | Stop reason: HOSPADM

## 2024-02-16 RX ORDER — HYDROCODONE BITARTRATE AND ACETAMINOPHEN 500; 5 MG/1; MG/1
TABLET ORAL
Status: DISCONTINUED | OUTPATIENT
Start: 2024-02-16 | End: 2024-02-17 | Stop reason: HOSPADM

## 2024-02-16 RX ORDER — IPRATROPIUM BROMIDE AND ALBUTEROL SULFATE 2.5; .5 MG/3ML; MG/3ML
3 SOLUTION RESPIRATORY (INHALATION) EVERY 6 HOURS PRN
Status: DISCONTINUED | OUTPATIENT
Start: 2024-02-16 | End: 2024-02-17 | Stop reason: HOSPADM

## 2024-02-16 RX ORDER — ISOSORBIDE MONONITRATE 30 MG/1
30 TABLET, EXTENDED RELEASE ORAL DAILY
Status: DISCONTINUED | OUTPATIENT
Start: 2024-02-16 | End: 2024-02-17 | Stop reason: HOSPADM

## 2024-02-16 RX ORDER — LEVOTHYROXINE SODIUM 75 UG/1
75 TABLET ORAL
Status: DISCONTINUED | OUTPATIENT
Start: 2024-02-16 | End: 2024-02-17 | Stop reason: HOSPADM

## 2024-02-16 RX ORDER — IBUPROFEN 200 MG
16 TABLET ORAL
Status: DISCONTINUED | OUTPATIENT
Start: 2024-02-16 | End: 2024-02-17 | Stop reason: HOSPADM

## 2024-02-16 RX ORDER — HYDRALAZINE HYDROCHLORIDE 25 MG/1
50 TABLET, FILM COATED ORAL EVERY 8 HOURS
Status: DISCONTINUED | OUTPATIENT
Start: 2024-02-16 | End: 2024-02-17 | Stop reason: HOSPADM

## 2024-02-16 RX ORDER — MORPHINE SULFATE 4 MG/ML
4 INJECTION, SOLUTION INTRAMUSCULAR; INTRAVENOUS
Status: COMPLETED | OUTPATIENT
Start: 2024-02-16 | End: 2024-02-16

## 2024-02-16 RX ORDER — IBUPROFEN 200 MG
24 TABLET ORAL
Status: DISCONTINUED | OUTPATIENT
Start: 2024-02-16 | End: 2024-02-17 | Stop reason: HOSPADM

## 2024-02-16 RX ORDER — NALOXONE HCL 0.4 MG/ML
0.02 VIAL (ML) INJECTION
Status: DISCONTINUED | OUTPATIENT
Start: 2024-02-16 | End: 2024-02-17 | Stop reason: HOSPADM

## 2024-02-16 RX ORDER — ATORVASTATIN CALCIUM 40 MG/1
80 TABLET, FILM COATED ORAL NIGHTLY
Status: DISCONTINUED | OUTPATIENT
Start: 2024-02-16 | End: 2024-02-17 | Stop reason: HOSPADM

## 2024-02-16 RX ORDER — GLUCAGON 1 MG
1 KIT INJECTION
Status: DISCONTINUED | OUTPATIENT
Start: 2024-02-16 | End: 2024-02-17 | Stop reason: HOSPADM

## 2024-02-16 RX ADMIN — ATORVASTATIN CALCIUM 80 MG: 40 TABLET, FILM COATED ORAL at 09:02

## 2024-02-16 RX ADMIN — HYDROCODONE BITARTRATE AND ACETAMINOPHEN 1 TABLET: 5; 325 TABLET ORAL at 09:02

## 2024-02-16 RX ADMIN — HEPARIN SODIUM 5000 UNITS: 1000 INJECTION, SOLUTION INTRAVENOUS; SUBCUTANEOUS at 11:02

## 2024-02-16 RX ADMIN — LEVOTHYROXINE SODIUM 75 MCG: 75 TABLET ORAL at 05:02

## 2024-02-16 RX ADMIN — EPOETIN ALFA-EPBX 10000 UNITS: 10000 INJECTION, SOLUTION INTRAVENOUS; SUBCUTANEOUS at 05:02

## 2024-02-16 RX ADMIN — HYDRALAZINE HYDROCHLORIDE 50 MG: 25 TABLET, FILM COATED ORAL at 05:02

## 2024-02-16 RX ADMIN — MORPHINE SULFATE 4 MG: 4 INJECTION INTRAVENOUS at 12:02

## 2024-02-16 RX ADMIN — HYDRALAZINE HYDROCHLORIDE 50 MG: 25 TABLET, FILM COATED ORAL at 09:02

## 2024-02-16 RX ADMIN — HYDROCODONE BITARTRATE AND ACETAMINOPHEN 1 TABLET: 5; 325 TABLET ORAL at 02:02

## 2024-02-16 NOTE — PT/OT/SLP PROGRESS
Physical Therapy      Patient Name:  Domingo Gross   MRN:  [id number]    Patient not seen today secondary to Dialysis. Additionally, pending ortho/NSGY consult for L4 fracture. Will follow-up as able.

## 2024-02-16 NOTE — PROVIDER PROGRESS NOTES - EMERGENCY DEPT.
Encounter Date: 2/15/2024    ED Physician Progress Notes        Physician Note:   I assumed care from Dr. Jackson at shift change.  Patient was reassessed after dialysis he had tenderness to palpation of his right lumbar spine.  X-ray was without acute findings here.  I decided to obtain CT imaging.  CT demonstrates right L4 transverse process spinal fracture.  Patient has no focal neuro deficits.  He had a hematoma to his left kidney, CTA demonstrated no active contrast extravasation and it was thought this this was due to his recent renal biopsy.  I also talked to Dr. Payne his nephrologist, she intends to dialyze him in the morning as he has missed several days of dialysis.  Patient's hemoglobin has remained decreased.  I suspect his fall a couple of days ago was due to symptomatic anemia, additionally he has required several doses of IV pain medication for pain control.  I have discussed with [facility] who will admit the patient to their service for further care.  Patient and and spouse at bedside verbalized understanding of plan of care and agree.    Portions of this note were dictated using voice recognition software and may contain dictation related errors in spelling/grammar/syntax not found on text review           
Chest pain

## 2024-02-16 NOTE — ASSESSMENT & PLAN NOTE
Concern for syncope   Suspicion is secondary to ABLA and orthostatic   monitor H&H, transfuse as needed   Patient does have acute nondisplaced lumbar fracture   Fall precautions

## 2024-02-16 NOTE — ASSESSMENT & PLAN NOTE
Chronic, controlled. Latest blood pressure and vitals reviewed-     Temp:  [96.5 °F (35.8 °C)-98.4 °F (36.9 °C)]   Pulse:  [65-72]   Resp:  [14-20]   BP: (129-154)/(60-84)   SpO2:  [96 %-98 %] .   Home meds for hypertension were reviewed and noted below.   Hypertension Medications               amLODIPine (NORVASC) 5 MG tablet Take 1 tablet (5 mg total) by mouth once daily.    carvediloL (COREG) 25 MG tablet Take 1 tablet (25 mg total) by mouth 2 (two) times daily with meals.    eplerenone (INSPRA) 50 MG Tab Take 1 tablet (50 mg total) by mouth once daily.    hydrALAZINE (APRESOLINE) 50 MG tablet Take 1 tablet (50 mg total) by mouth every 8 (eight) hours.    isosorbide mononitrate (IMDUR) 30 MG 24 hr tablet Take 1 tablet (30 mg total) by mouth once daily.    nitroGLYCERIN (NITROSTAT) 0.4 MG SL tablet Place 0.4 mg under the tongue every 5 (five) minutes as needed for Chest pain.            While in the hospital, will manage blood pressure as follows; Continue home antihypertensive regimen    Will utilize p.r.n. blood pressure medication only if patient's blood pressure greater than 180/110 and he develops symptoms such as worsening chest pain or shortness of breath.

## 2024-02-16 NOTE — ASSESSMENT & PLAN NOTE
Creatine stable for now. BMP reviewed- noted Estimated Creatinine Clearance: 8.4 mL/min (A) (based on SCr of 9.4 mg/dL (H)). according to latest data. Based on current GFR, CKD stage is end stage.  Monitor UOP and serial BMP and adjust therapy as needed. Renally dose meds. Avoid nephrotoxic medications and procedures.     Consult nephrology for HD management   HadHD today inpatient to 02/15/2024, followed by Dr. Payne,  will likely plan for HD again in a.m.

## 2024-02-16 NOTE — PLAN OF CARE
PCP follow-up noted to be previously scheduled. Per Neurosurgery note, needs follow-up in 4-6 weeks. Scheduled.  sent appointment requested to access navigator.    Patient Contacts    Name Relation Home Work Mobile   Karmen Gross Spouse   [phone]     Future Appointments   Date Time Provider Department Center   2/20/2024  8:00 AM Analy Encarnacion MD [facility] [place]   2/29/2024  7:15 AM LAB, [facility] LAB [place]   3/1/2024  3:20 PM Enoch Tirado MD [facility] CARDIO [facility]   3/5/2024  2:40 PM Analy Encarnacion MD [facility] [place]   3/7/2024 11:00 AM Loly Payne MD [facility]   3/13/2024  9:30 AM APPOINTMENT LABPREETI [facility] LAB [facility]   3/13/2024 10:00 AM Robby Reddy MD [facility] HEM ONC [place] [facility]   3/28/2024  1:00 PM [facility] MRI1 500 LB LIMIT [facility] MRI [facility]   4/4/2024 10:00 AM Cynthia Diaz, PA-C [facility] NEUROSU [place] [facility]       Amy Hernandez RN    [phone]

## 2024-02-16 NOTE — ASSESSMENT & PLAN NOTE
Patient's COPD is controlled currently.  Patient is currently off COPD Pathway. Continue scheduled inhalers Steroids and monitor respiratory status closely.

## 2024-02-16 NOTE — HPI
Patient is a 63-year-old male with a history of ESRD who said while in [place] 2 days ago his legs became weak causing him to slide down the counter onto the floor, he denies LOC.  Patient reports he was unable to get dialysis that day. His last dialysis was 5 days ago. He began to experience pain to his lower back yesterday. No numbness. No bowel incontinence. He denies chest pain or shortness of breath. No fever or cough.  In ED today labs remarkable for H&H 7.2 and 21.6 which has continued to trend down over the last month.Patient had HD today and continue to complain of back pain, CT L-spine showed a nondisplaced right L4 transverse process fracture, additionally  a  partial visualized left perinephric hematoma.  CTA of the abdomen and pelvis was then performed which showed no extravasation of contrast  of left perinephric hematoma thought to be from recent renal biopsy. Patient's pain has improved with narcotics in ED. Patient will be admitted to Hospital Medicine will consult Nephrology who will likely perform additional HD in a.m. given his multiple missed dialysis sessions   cough

## 2024-02-16 NOTE — H&P
[facility] Medicine  History & Physical    Patient Name: Domingo Gross  MRN: [id number]  Patient Class: OP- Observation  Admission Date: 2/15/2024  Attending Physician: Pawan Garcia,*   Primary Care Provider: Analy Encarnacion MD         Patient information was obtained from patient and ER records.     Subjective:     Principal Problem:<principal problem not specified>    Chief Complaint:   Chief Complaint   Patient presents with    Back Pain     Reports slip and fall at [facility] on Tuesday. Since then with c/o lower back pain radiating down both legs. States yesterday was able to sit up and ambulate with assistance but today pain was too bad. Denies taking OTC meds for pain relief. Presents awake, alert. States he missed dialysis yesterday due to pain. Last dialysis on Saturday. No distress noted.         HPI: Patient is a 63-year-old male with a history of ESRD who said while in [facility] 2 days ago his legs became weak causing him to slide down the counter onto the floor, he denies LOC.  Patient reports he was unable to get dialysis that day. His last dialysis was 5 days ago. He began to experience pain to his lower back yesterday. No numbness. No bowel incontinence. He denies chest pain or shortness of breath. No fever or cough.  In ED today labs remarkable for H&H 7.2 and 21.6 which has continued to trend down over the last month.Patient had HD today and continue to complain of back pain, CT L-spine showed a nondisplaced right L4 transverse process fracture, additionally  a  partial visualized left perinephric hematoma.  CTA of the abdomen and pelvis was then performed which showed no extravasation of contrast  of left perinephric hematoma thought to be from recent renal biopsy. Patient's pain has improved with narcotics in ED. Patient will be admitted to Hospital Medicine will consult Nephrology who will likely perform additional HD in a.m. given his multiple missed dialysis  sessions    Past Medical History:   Diagnosis Date    Allergy     Anemia     Anticoagulant long-term use     Arthritis     CKD (chronic kidney disease) stage 4, GFR 15-29 ml/min     COPD (chronic obstructive pulmonary disease) 08/20/2021    Coronary artery disease     Heart attack 10/04/2019    Hematuria     Hemothorax     Hyperlipidemia     Hypertension     Hyperuricemia     Hypocalcemia     Hypokalemia     Hypophosphatemia     Hypothyroidism 3/2/2023    PAD (peripheral artery disease)     Proteinuria     Vitamin D deficiency        Past Surgical History:   Procedure Laterality Date    ABDOMINAL AORTOGRAPHY N/A 5/10/2019    Procedure: AORTOGRAM-ABDOMINAL;  Surgeon: Keo Jenkins MD;  Location: [facility] CATH LAB/EP;  Service: Cardiology;  Laterality: N/A;  RSFA intervention     AORTOGRAPHY WITH SERIALOGRAPHY N/A 12/3/2021    Procedure: AORTOGRAM, WITH SERIALOGRAPHY;  Surgeon: Keo Jenkins MD;  Location: [facility] CATH LAB/EP;  Service: Cardiology;  Laterality: N/A;    AORTOGRAPHY WITH SERIALOGRAPHY N/A 4/1/2022    Procedure: AORTOGRAM, WITH SERIALOGRAPHY;  Surgeon: Keo Jenkins MD;  Location: [facility] CATH LAB/EP;  Service: Cardiology;  Laterality: N/A;    ATHERECTOMY, CORONARY N/A 4/25/2023    Procedure: Atherectomy-coronary;  Surgeon: Keo Jenkins MD;  Location: [facility] CATH LAB/EP;  Service: Cardiology;  Laterality: N/A;    BONE MARROW BIOPSY Right 10/12/2021    Procedure: BIOPSY-BONE MARROW;  Surgeon: Naseem Woods MD;  Location: [facility] OR;  Service: Oncology;  Laterality: Right;    CARDIAC CATHETERIZATION      CATARACT EXTRACTION      CATARACT EXTRACTION W/  INTRAOCULAR LENS IMPLANT Right 6/8/2020    Procedure: EXTRACTION, CATARACT, WITH IOL INSERTION;  Surgeon: Tin Light MD;  Location: [facility];  Service: Ophthalmology;  Laterality: Right;    CATARACT EXTRACTION W/  INTRAOCULAR LENS IMPLANT Left 7/2/2020    Procedure: EXTRACTION, CATARACT, WITH IOL INSERTION;  Surgeon: Tin Light MD;  Location: [facility];   Service: Ophthalmology;  Laterality: Left;    COLONOSCOPY N/A 1/6/2022    Procedure: COLONOSCOPY;  Surgeon: Kathe Penaloza MD;  Location: [facility] (2ND FLR);  Service: Endoscopy;  Laterality: N/A;  COVID test at [facility] on 1/3-GT  okay to hold Plavix for 5 days and aspirin per Dr. Becerra  2nd floor due toextensive cardiac history   instructions mailed and my [facility] portal -     CORONARY ANGIOGRAPHY N/A 3/29/2019    Procedure: ANGIOGRAM, CORONARY ARTERY;  Surgeon: Keo Jenkins MD;  Location: [facility] CATH LAB/EP;  Service: Cardiology;  Laterality: N/A;    CORONARY ANGIOGRAPHY Left 10/11/2019    Procedure: ANGIOGRAM, CORONARY ARTERY;  Surgeon: Keo Jenkins MD;  Location: [facility] CATH LAB/EP;  Service: Cardiology;  Laterality: Left;    CORONARY ANGIOGRAPHY N/A 4/10/2023    Procedure: ANGIOGRAM, CORONARY ARTERY;  Surgeon: Keo Jenkins MD;  Location: [facility] CATH LAB/EP;  Service: Cardiology;  Laterality: N/A;    CORONARY ANGIOPLASTY WITH STENT PLACEMENT  03/29/2019    mid and distal RCA    ESOPHAGOGASTRODUODENOSCOPY N/A 1/6/2022    Procedure: EGD (ESOPHAGOGASTRODUODENOSCOPY);  Surgeon: Kathe Penaloza MD;  Location: [facility] (2ND FLR);  Service: Endoscopy;  Laterality: N/A;    EYE SURGERY      INSERTION OF TUNNELED CENTRAL VENOUS HEMODIALYSIS CATHETER N/A 2/9/2024    Procedure: INSERTION, CATHETER, HEMODIALYSIS, DUAL LUMEN;  Surgeon: Wang Mendieta MD;  Location: [facility] OR;  Service: General;  Laterality: N/A;    INSTANTANEOUS WAVE-FREE RATIO (IFR) N/A 4/25/2023    Procedure: Instantaneous Wave-Free Ratio (IFR);  Surgeon: Keo Jenkins MD;  Location: [facility] CATH LAB/EP;  Service: Cardiology;  Laterality: N/A;    INTRAVASCULAR ULTRASOUND, NON-CORONARY  8/12/2022    Procedure: Intravascular Ultrasound, Non-Coronary;  Surgeon: Keo Jenkins MD;  Location: [facility] CATH LAB/EP;  Service: Cardiology;;    IVUS, CORONARY  4/25/2023    Procedure: IVUS, Coronary;  Surgeon: Keo Jenkins MD;  Location: [facility] CATH LAB/EP;   Service: Cardiology;;    IVUS, CORONARY  5/16/2023    Procedure: IVUS, Coronary;  Surgeon: Keo Jenkins MD;  Location: [facility] CATH LAB/EP;  Service: Cardiology;;  CX    LEFT HEART CATHETERIZATION N/A 3/29/2019    Procedure: Left heart cath;  Surgeon: Keo Jenkins MD;  Location: [facility] CATH LAB/EP;  Service: Cardiology;  Laterality: N/A;    LEFT HEART CATHETERIZATION Left 10/8/2019    Procedure: Left heart cath;  Surgeon: Keo Jenkins MD;  Location: [facility] CATH LAB/EP;  Service: Cardiology;  Laterality: Left;    LEFT HEART CATHETERIZATION Left 4/10/2023    Procedure: Left heart cath;  Surgeon: Keo Jenkins MD;  Location: [facility] CATH LAB/EP;  Service: Cardiology;  Laterality: Left;    LEFT HEART CATHETERIZATION Left 4/25/2023    Procedure: Left heart cath;  Surgeon: Keo Jenkins MD;  Location: [facility] CATH LAB/EP;  Service: Cardiology;  Laterality: Left;    LEFT HEART CATHETERIZATION Left 5/16/2023    Procedure: Left heart cath;  Surgeon: Keo Jenkins MD;  Location: [facility] CATH LAB/EP;  Service: Cardiology;  Laterality: Left;    PERCUTANEOUS TRANSLUMINAL ANGIOPLASTY (PTA) OF PERIPHERAL VESSEL N/A 7/12/2019    Procedure: PTA, PERIPHERAL VESSEL;  Surgeon: Keo Jenkins MD;  Location: [facility] CATH LAB/EP;  Service: Cardiology;  Laterality: N/A;    PERCUTANEOUS TRANSLUMINAL ANGIOPLASTY (PTA) OF PERIPHERAL VESSEL Left 5/20/2022    Procedure: PTA, PERIPHERAL VESSEL;  Surgeon: Keo Jenkins MD;  Location: [facility] CATH LAB/EP;  Service: Cardiology;  Laterality: Left;    PERCUTANEOUS TRANSLUMINAL ANGIOPLASTY (PTA) OF PERIPHERAL VESSEL Right 8/12/2022    Procedure: PTA, PERIPHERAL VESSEL;  Surgeon: Keo Jenkins MD;  Location: [facility] CATH LAB/EP;  Service: Cardiology;  Laterality: Right;    PERCUTANEOUS TRANSLUMINAL BALLOON ANGIOPLASTY OF CORONARY ARTERY  4/25/2023    Procedure: Angioplasty-coronary;  Surgeon: Keo Jenkins MD;  Location: [facility] CATH LAB/EP;  Service: Cardiology;;    PTCA, SINGLE VESSEL  5/16/2023     Procedure: PTCA, Single Vessel;  Surgeon: Keo Jenkins MD;  Location: [facility] CATH LAB/EP;  Service: Cardiology;;  CX    REMOVAL OF HEMODIALYSIS CATHETER Right 2/9/2024    Procedure: REMOVAL, CATHETER, HEMODIALYSIS;  Surgeon: Wang Mendieta MD;  Location: [facility] OR;  Service: General;  Laterality: Right;    STENT, DRUG ELUTING, SINGLE VESSEL, CORONARY  4/25/2023    Procedure: Stent, Drug Eluting, Single Vessel, Coronary;  Surgeon: Keo Jenkins MD;  Location: [facility] CATH LAB/EP;  Service: Cardiology;;    STENT, DRUG ELUTING, SINGLE VESSEL, CORONARY  5/16/2023    Procedure: Stent, Drug Eluting, Single Vessel, Coronary;  Surgeon: Keo Jenkins MD;  Location: [facility] CATH LAB/EP;  Service: Cardiology;;  CX    TRANSESOPHAGEAL ECHOCARDIOGRAM WITH POSSIBLE CARDIOVERSION (JOSE W/ POSS CARDIOVERSION) N/A 6/19/2023    Procedure: Transesophageal echo (JOSE) intra-procedure log documentation;  Surgeon: Keo Jenkins MD;  Location: [facility] CATH LAB/EP;  Service: Cardiology;  Laterality: N/A;       Review of patient's allergies indicates:   Allergen Reactions    Ace inhibitors Rash       Current Facility-Administered Medications on File Prior to Encounter   Medication    sodium chloride 0.9% infusion     Current Outpatient Medications on File Prior to Encounter   Medication Sig    albuterol (PROVENTIL/VENTOLIN HFA) 90 mcg/actuation inhaler INHALE 2 PUFFS INTO THE LUNGS EVERY 6 HOURS AS NEEDED FOR WHEEZING    albuterol-ipratropium (DUO-NEB) 2.5 mg-0.5 mg/3 mL nebulizer solution Take 3 mLs by nebulization every 6 (six) hours as needed for Wheezing or Shortness of Breath (or cough). Rescue    amLODIPine (NORVASC) 5 MG tablet Take 1 tablet (5 mg total) by mouth once daily.    apixaban (ELIQUIS) 5 mg Tab Take 1 tablet (5 mg total) by mouth 2 (two) times daily.    carvediloL (COREG) 25 MG tablet Take 1 tablet (25 mg total) by mouth 2 (two) times daily with meals.    clopidogreL (PLAVIX) 75 mg tablet Take 1 tablet (75 mg total) by mouth  once daily.    coenzyme Q10 100 mg capsule Take 100 mg by mouth once daily.    eplerenone (INSPRA) 50 MG Tab Take 1 tablet (50 mg total) by mouth once daily.    hydrALAZINE (APRESOLINE) 50 MG tablet Take 1 tablet (50 mg total) by mouth every 8 (eight) hours.    hydrOXYzine pamoate (VISTARIL) 25 MG Cap Take 1 capsule (25 mg total) by mouth nightly as needed (Insomnia/Anxiety).    isosorbide mononitrate (IMDUR) 30 MG 24 hr tablet Take 1 tablet (30 mg total) by mouth once daily.    levothyroxine (SYNTHROID) 75 MCG tablet Take 1 tablet (75 mcg total) by mouth before breakfast.    LIDOcaine (LIDODERM) 5 % Place 1 patch onto the skin daily as needed (back pain). Remove & Discard patch within 12 hours or as directed by MD    nitroGLYCERIN (NITROSTAT) 0.4 MG SL tablet Place 0.4 mg under the tongue every 5 (five) minutes as needed for Chest pain.    ranolazine (RANEXA) 500 MG Tb12 Take 1 tablet (500 mg total) by mouth 2 (two) times daily.    rosuvastatin (CRESTOR) 40 MG Tab Take 1 tablet (40 mg total) by mouth every evening.    sodium bicarbonate 650 MG tablet Take 1 tablet (650 mg total) by mouth once daily. for 30 doses     Family History       Problem Relation (Age of Onset)    Aneurysm Mother    Cancer Father    Diabetes Sister    Heart disease Mother    Hypertension Mother, Sister    No Known Problems Brother, Son          Tobacco Use    Smoking status: Former     Current packs/day: 0.00     Types: Cigarettes     Quit date: 1999     Years since quittin.8    Smokeless tobacco: Never   Substance and Sexual Activity    Alcohol use: No     Alcohol/week: 0.0 standard drinks of alcohol    Drug use: No    Sexual activity: Yes     Partners: Female     Review of Systems   Musculoskeletal:  Positive for back pain.   Neurological:  Positive for weakness and light-headedness.   All other systems reviewed and are negative.    Objective:     Vital Signs (Most Recent):  Temp: 98.4 °F (36.9 °C) (24 0301)  Pulse: 69  (02/16/24 0301)  Resp: 15 (02/16/24 0301)  BP: (!) 150/67 (02/16/24 0301)  SpO2: 96 % (02/16/24 0301) Vital Signs (24h Range):  Temp:  [96.5 °F (35.8 °C)-98.4 °F (36.9 °C)] 98.4 °F (36.9 °C)  Pulse:  [65-72] 69  Resp:  [14-20] 15  SpO2:  [96 %-98 %] 96 %  BP: (129-154)/(60-84) 150/67     Weight: 79 kg (174 lb 2.6 oz)  Body mass index is 24.99 kg/m².     Physical Exam  Vitals reviewed.   HENT:      Head: Normocephalic and atraumatic.      Mouth/Throat:      Mouth: Mucous membranes are dry.   Cardiovascular:      Rate and Rhythm: Normal rate and regular rhythm.      Pulses: Normal pulses.      Heart sounds: Normal heart sounds.   Pulmonary:      Effort: Pulmonary effort is normal.      Breath sounds: Normal breath sounds.   Abdominal:      General: Bowel sounds are normal.      Palpations: Abdomen is soft.   Musculoskeletal:      Cervical back: Normal range of motion.   Skin:     General: Skin is warm and dry.      Capillary Refill: Capillary refill takes less than 2 seconds.   Neurological:      General: No focal deficit present.      Mental Status: He is alert and oriented to person, place, and time.   Psychiatric:         Mood and Affect: Mood normal.         Behavior: Behavior normal.                Significant Labs: All pertinent labs within the past 24 hours have been reviewed.  Recent Lab Results         02/15/24  1106   02/15/24  1021        Albumin 2.0         ALP 62         ALT 32         Anion Gap 15         AST 78         Baso # 0.02         Basophil % 0.2         BILIRUBIN TOTAL 0.5  Comment: For infants and newborns, interpretation of results should be based  on gestational age, weight and in agreement with clinical  observations.    Premature Infant recommended reference ranges:  Up to 24 hours.............<8.0 mg/dL  Up to 48 hours............<12.0 mg/dL  3-5 days..................<15.0 mg/dL  6-29 days.................<15.0 mg/dL           BUN 63         Calcium 8.3         Chloride 99         CO2  18         Creatinine 9.4         Differential Method Automated         eGFR 6         Eos # 0.0         Eos % 0.0         Glucose 103         Gran # (ANC) 6.8         Gran % 80.8         Hematocrit 21.6         Hemoglobin 7.2         Immature Grans (Abs) 0.03  Comment: Mild elevation in immature granulocytes is non specific and   can be seen in a variety of conditions including stress response,   acute inflammation, trauma and pregnancy. Correlation with other   laboratory and clinical findings is essential.           Immature Granulocytes 0.4         Lymph # 0.7         Lymph % 8.1         Magnesium  1.9         MCH 28.6         MCHC 33.3         MCV 86         Mono # 0.9         Mono % 10.5         MPV 10.9         nRBC 0         QRS Duration   174       OHS QTC Calculation   550       Phosphorus Level 6.9         Platelet Count 202         Potassium 4.0         PROTEIN TOTAL 6.1         RBC 2.52         RDW 13.2         Sodium 132         WBC 8.47                 Significant Imaging: I have reviewed all pertinent imaging results/findings within the past 24 hours.  I have reviewed and interpreted all pertinent imaging results/findings within the past 24 hours.  Assessment/Plan:     Fall   Concern for syncope   Suspicion is secondary to ABLA and orthostatic   monitor H&H, transfuse as needed   Patient does have acute nondisplaced lumbar fracture   Fall precautions        ESRD (end stage renal disease)  Creatine stable for now. BMP reviewed- noted Estimated Creatinine Clearance: 8.4 mL/min (A) (based on SCr of 9.4 mg/dL (H)). according to latest data. Based on current GFR, CKD stage is end stage.  Monitor UOP and serial BMP and adjust therapy as needed. Renally dose meds. Avoid nephrotoxic medications and procedures.     Consult nephrology for HD management   [facility] today inpatient to 02/15/2024, followed by Dr. Payne,  will likely plan for HD again in a.m.    ABLA (acute blood loss anemia)  Patient's anemia is  currently controlled. Has not received any PRBCs to date. Etiology likely d/t acute blood loss which was from anticoagulation   and left perinephric hematoma hematoma and chronic disease due to Chronic Kidney Disease/ESRD  Current CBC reviewed-   Lab Results   Component Value Date    HGB 7.2 (L) 02/15/2024    HCT 21.6 (L) 02/15/2024     Monitor serial CBC and transfuse if patient becomes hemodynamically unstable, symptomatic or H/H drops below 7/21.   Check H&H in a.m.   Holding Eliquis and Plavix today    Closed fracture of transverse process of lumbar vertebra   CT L-spine  nondisplaced L4 right transverse process fracture   Consult PT   Consider ortho/neuro consultation   Pain control   Secondary to fall    COPD (chronic obstructive pulmonary disease)  Patient's COPD is controlled currently.  Patient is currently off COPD Pathway. Continue scheduled inhalers Steroids and monitor respiratory status closely.     HTN (hypertension)  Chronic, controlled. Latest blood pressure and vitals reviewed-     Temp:  [96.5 °F (35.8 °C)-98.4 °F (36.9 °C)]   Pulse:  [65-72]   Resp:  [14-20]   BP: (129-154)/(60-84)   SpO2:  [96 %-98 %] .   Home meds for hypertension were reviewed and noted below.   Hypertension Medications               amLODIPine (NORVASC) 5 MG tablet Take 1 tablet (5 mg total) by mouth once daily.    carvediloL (COREG) 25 MG tablet Take 1 tablet (25 mg total) by mouth 2 (two) times daily with meals.    eplerenone (INSPRA) 50 MG Tab Take 1 tablet (50 mg total) by mouth once daily.    hydrALAZINE (APRESOLINE) 50 MG tablet Take 1 tablet (50 mg total) by mouth every 8 (eight) hours.    isosorbide mononitrate (IMDUR) 30 MG 24 hr tablet Take 1 tablet (30 mg total) by mouth once daily.    nitroGLYCERIN (NITROSTAT) 0.4 MG SL tablet Place 0.4 mg under the tongue every 5 (five) minutes as needed for Chest pain.            While in the hospital, will manage blood pressure as follows; Continue home antihypertensive  regimen    Will utilize p.r.n. blood pressure medication only if patient's blood pressure greater than 180/110 and he develops symptoms such as worsening chest pain or shortness of breath.      VTE Risk Mitigation (From admission, onward)           Ordered     IP VTE HIGH RISK PATIENT  Once         02/16/24 0330     Place sequential compression device  Until discontinued         02/16/24 0330                         On 02/16/2024, patient should be placed in hospital observation services under my care in collaboration with Dr Quach.      Seen on dialysis  Pulse 65  /71    Memo Torres NP  Department of Hospital Medicine  [facility]

## 2024-02-16 NOTE — SUBJECTIVE & OBJECTIVE
Past Medical History:   Diagnosis Date    Allergy     Anemia     Anticoagulant long-term use     Arthritis     CKD (chronic kidney disease) stage 4, GFR 15-29 ml/min     COPD (chronic obstructive pulmonary disease) 08/20/2021    Coronary artery disease     Heart attack 10/04/2019    Hematuria     Hemothorax     Hyperlipidemia     Hypertension     Hyperuricemia     Hypocalcemia     Hypokalemia     Hypophosphatemia     Hypothyroidism 3/2/2023    PAD (peripheral artery disease)     Proteinuria     Vitamin D deficiency        Past Surgical History:   Procedure Laterality Date    ABDOMINAL AORTOGRAPHY N/A 5/10/2019    Procedure: AORTOGRAM-ABDOMINAL;  Surgeon: Keo Jenkins MD;  Location: [facility] CATH LAB/EP;  Service: Cardiology;  Laterality: N/A;  RSFA intervention     AORTOGRAPHY WITH SERIALOGRAPHY N/A 12/3/2021    Procedure: AORTOGRAM, WITH SERIALOGRAPHY;  Surgeon: Keo Jenkins MD;  Location: [facility] CATH LAB/EP;  Service: Cardiology;  Laterality: N/A;    AORTOGRAPHY WITH SERIALOGRAPHY N/A 4/1/2022    Procedure: AORTOGRAM, WITH SERIALOGRAPHY;  Surgeon: Keo Jenkins MD;  Location: [facility] CATH LAB/EP;  Service: Cardiology;  Laterality: N/A;    ATHERECTOMY, CORONARY N/A 4/25/2023    Procedure: Atherectomy-coronary;  Surgeon: Keo Jenkins MD;  Location: [facility] CATH LAB/EP;  Service: Cardiology;  Laterality: N/A;    BONE MARROW BIOPSY Right 10/12/2021    Procedure: BIOPSY-BONE MARROW;  Surgeon: Naseem Woods MD;  Location: [facility] OR;  Service: Oncology;  Laterality: Right;    CARDIAC CATHETERIZATION      CATARACT EXTRACTION      CATARACT EXTRACTION W/  INTRAOCULAR LENS IMPLANT Right 6/8/2020    Procedure: EXTRACTION, CATARACT, WITH IOL INSERTION;  Surgeon: Tin Light MD;  Location: [facility] OR;  Service: Ophthalmology;  Laterality: Right;    CATARACT EXTRACTION W/  INTRAOCULAR LENS IMPLANT Left 7/2/2020    Procedure: EXTRACTION, CATARACT, WITH IOL INSERTION;  Surgeon: Tin Light MD;  Location: [facility] OR;  Service:  Ophthalmology;  Laterality: Left;    COLONOSCOPY N/A 1/6/2022    Procedure: COLONOSCOPY;  Surgeon: Kathe Penaloza MD;  Location: [facility] ENDO (2ND FLR);  Service: Endoscopy;  Laterality: N/A;  COVID test at [facility] on 1/3-GT  okay to hold Plavix for 5 days and aspirin per Dr. Becerra  2nd floor due toextensive cardiac history   instructions mailed and my [facility] portal -     CORONARY ANGIOGRAPHY N/A 3/29/2019    Procedure: ANGIOGRAM, CORONARY ARTERY;  Surgeon: Keo Jenkins MD;  Location: [facility] CATH LAB/EP;  Service: Cardiology;  Laterality: N/A;    CORONARY ANGIOGRAPHY Left 10/11/2019    Procedure: ANGIOGRAM, CORONARY ARTERY;  Surgeon: Keo Jenkins MD;  Location: [facility] CATH LAB/EP;  Service: Cardiology;  Laterality: Left;    CORONARY ANGIOGRAPHY N/A 4/10/2023    Procedure: ANGIOGRAM, CORONARY ARTERY;  Surgeon: Keo Jenkins MD;  Location: [facility] CATH LAB/EP;  Service: Cardiology;  Laterality: N/A;    CORONARY ANGIOPLASTY WITH STENT PLACEMENT  03/29/2019    mid and distal RCA    ESOPHAGOGASTRODUODENOSCOPY N/A 1/6/2022    Procedure: EGD (ESOPHAGOGASTRODUODENOSCOPY);  Surgeon: Kathe Penaloza MD;  Location: [facility] ENDO (2ND FLR);  Service: Endoscopy;  Laterality: N/A;    EYE SURGERY      INSERTION OF TUNNELED CENTRAL VENOUS HEMODIALYSIS CATHETER N/A 2/9/2024    Procedure: INSERTION, CATHETER, HEMODIALYSIS, DUAL LUMEN;  Surgeon: Wang Mendieta MD;  Location: [facility] OR;  Service: General;  Laterality: N/A;    INSTANTANEOUS WAVE-FREE RATIO (IFR) N/A 4/25/2023    Procedure: Instantaneous Wave-Free Ratio (IFR);  Surgeon: Keo Jenkins MD;  Location: [facility] CATH LAB/EP;  Service: Cardiology;  Laterality: N/A;    INTRAVASCULAR ULTRASOUND, NON-CORONARY  8/12/2022    Procedure: Intravascular Ultrasound, Non-Coronary;  Surgeon: Keo Jenkins MD;  Location: [facility] CATH LAB/EP;  Service: Cardiology;;    IVUS, CORONARY  4/25/2023    Procedure: IVUS, Coronary;  Surgeon: Keo Jenkins MD;  Location: [facility] CATH LAB/EP;  Service:  Cardiology;;    IVUS, CORONARY  5/16/2023    Procedure: IVUS, Coronary;  Surgeon: Keo Jenkins MD;  Location: [facility] CATH LAB/EP;  Service: Cardiology;;  CX    LEFT HEART CATHETERIZATION N/A 3/29/2019    Procedure: Left heart cath;  Surgeon: Keo Jenkins MD;  Location: [facility] CATH LAB/EP;  Service: Cardiology;  Laterality: N/A;    LEFT HEART CATHETERIZATION Left 10/8/2019    Procedure: Left heart cath;  Surgeon: Keo Jenkins MD;  Location: [facility] CATH LAB/EP;  Service: Cardiology;  Laterality: Left;    LEFT HEART CATHETERIZATION Left 4/10/2023    Procedure: Left heart cath;  Surgeon: Keo Jenkins MD;  Location: [facility] CATH LAB/EP;  Service: Cardiology;  Laterality: Left;    LEFT HEART CATHETERIZATION Left 4/25/2023    Procedure: Left heart cath;  Surgeon: Keo Jenkins MD;  Location: [facility] CATH LAB/EP;  Service: Cardiology;  Laterality: Left;    LEFT HEART CATHETERIZATION Left 5/16/2023    Procedure: Left heart cath;  Surgeon: Keo Jenkins MD;  Location: [facility] CATH LAB/EP;  Service: Cardiology;  Laterality: Left;    PERCUTANEOUS TRANSLUMINAL ANGIOPLASTY (PTA) OF PERIPHERAL VESSEL N/A 7/12/2019    Procedure: PTA, PERIPHERAL VESSEL;  Surgeon: Keo Jenkins MD;  Location: [facility] CATH LAB/EP;  Service: Cardiology;  Laterality: N/A;    PERCUTANEOUS TRANSLUMINAL ANGIOPLASTY (PTA) OF PERIPHERAL VESSEL Left 5/20/2022    Procedure: PTA, PERIPHERAL VESSEL;  Surgeon: Keo Jenkins MD;  Location: [facility] CATH LAB/EP;  Service: Cardiology;  Laterality: Left;    PERCUTANEOUS TRANSLUMINAL ANGIOPLASTY (PTA) OF PERIPHERAL VESSEL Right 8/12/2022    Procedure: PTA, PERIPHERAL VESSEL;  Surgeon: Keo Jenkins MD;  Location: [facility] CATH LAB/EP;  Service: Cardiology;  Laterality: Right;    PERCUTANEOUS TRANSLUMINAL BALLOON ANGIOPLASTY OF CORONARY ARTERY  4/25/2023    Procedure: Angioplasty-coronary;  Surgeon: Keo Jenkins MD;  Location: [facility] CATH LAB/EP;  Service: Cardiology;;    PTCA, SINGLE VESSEL  5/16/2023    Procedure: PTCA,  Single Vessel;  Surgeon: Keo Jenkins MD;  Location: [facility] CATH LAB/EP;  Service: Cardiology;;  CX    REMOVAL OF HEMODIALYSIS CATHETER Right 2/9/2024    Procedure: REMOVAL, CATHETER, HEMODIALYSIS;  Surgeon: Wang Mendieta MD;  Location: [facility] OR;  Service: General;  Laterality: Right;    STENT, DRUG ELUTING, SINGLE VESSEL, CORONARY  4/25/2023    Procedure: Stent, Drug Eluting, Single Vessel, Coronary;  Surgeon: Keo Jenkins MD;  Location: [facility] CATH LAB/EP;  Service: Cardiology;;    STENT, DRUG ELUTING, SINGLE VESSEL, CORONARY  5/16/2023    Procedure: Stent, Drug Eluting, Single Vessel, Coronary;  Surgeon: Keo Jenkins MD;  Location: [facility] CATH LAB/EP;  Service: Cardiology;;  CX    TRANSESOPHAGEAL ECHOCARDIOGRAM WITH POSSIBLE CARDIOVERSION (JOSE W/ POSS CARDIOVERSION) N/A 6/19/2023    Procedure: Transesophageal echo (JOSE) intra-procedure log documentation;  Surgeon: Keo Jenkins MD;  Location: [facility] CATH LAB/EP;  Service: Cardiology;  Laterality: N/A;       Review of patient's allergies indicates:   Allergen Reactions    Ace inhibitors Rash       Current Facility-Administered Medications on File Prior to Encounter   Medication    sodium chloride 0.9% infusion     Current Outpatient Medications on File Prior to Encounter   Medication Sig    albuterol (PROVENTIL/VENTOLIN HFA) 90 mcg/actuation inhaler INHALE 2 PUFFS INTO THE LUNGS EVERY 6 HOURS AS NEEDED FOR WHEEZING    albuterol-ipratropium (DUO-NEB) 2.5 mg-0.5 mg/3 mL nebulizer solution Take 3 mLs by nebulization every 6 (six) hours as needed for Wheezing or Shortness of Breath (or cough). Rescue    amLODIPine (NORVASC) 5 MG tablet Take 1 tablet (5 mg total) by mouth once daily.    apixaban (ELIQUIS) 5 mg Tab Take 1 tablet (5 mg total) by mouth 2 (two) times daily.    carvediloL (COREG) 25 MG tablet Take 1 tablet (25 mg total) by mouth 2 (two) times daily with meals.    clopidogreL (PLAVIX) 75 mg tablet Take 1 tablet (75 mg total) by mouth once daily.     coenzyme Q10 100 mg capsule Take 100 mg by mouth once daily.    eplerenone (INSPRA) 50 MG Tab Take 1 tablet (50 mg total) by mouth once daily.    hydrALAZINE (APRESOLINE) 50 MG tablet Take 1 tablet (50 mg total) by mouth every 8 (eight) hours.    hydrOXYzine pamoate (VISTARIL) 25 MG Cap Take 1 capsule (25 mg total) by mouth nightly as needed (Insomnia/Anxiety).    isosorbide mononitrate (IMDUR) 30 MG 24 hr tablet Take 1 tablet (30 mg total) by mouth once daily.    levothyroxine (SYNTHROID) 75 MCG tablet Take 1 tablet (75 mcg total) by mouth before breakfast.    LIDOcaine (LIDODERM) 5 % Place 1 patch onto the skin daily as needed (back pain). Remove & Discard patch within 12 hours or as directed by MD    nitroGLYCERIN (NITROSTAT) 0.4 MG SL tablet Place 0.4 mg under the tongue every 5 (five) minutes as needed for Chest pain.    ranolazine (RANEXA) 500 MG Tb12 Take 1 tablet (500 mg total) by mouth 2 (two) times daily.    rosuvastatin (CRESTOR) 40 MG Tab Take 1 tablet (40 mg total) by mouth every evening.    sodium bicarbonate 650 MG tablet Take 1 tablet (650 mg total) by mouth once daily. for 30 doses     Family History       Problem Relation (Age of Onset)    Aneurysm Mother    Cancer Father    Diabetes Sister    Heart disease Mother    Hypertension Mother, Sister    No Known Problems Brother, Son          Tobacco Use    Smoking status: Former     Current packs/day: 0.00     Types: Cigarettes     Quit date: 1999     Years since quittin.8    Smokeless tobacco: Never   Substance and Sexual Activity    Alcohol use: No     Alcohol/week: 0.0 standard drinks of alcohol    Drug use: No    Sexual activity: Yes     Partners: Female     Review of Systems   Musculoskeletal:  Positive for back pain.   Neurological:  Positive for weakness and light-headedness.   All other systems reviewed and are negative.    Objective:     Vital Signs (Most Recent):  Temp: 98.4 °F (36.9 °C) (24 0301)  Pulse: 69 (24  0301)  Resp: 15 (02/16/24 0301)  BP: (!) 150/67 (02/16/24 0301)  SpO2: 96 % (02/16/24 0301) Vital Signs (24h Range):  Temp:  [96.5 °F (35.8 °C)-98.4 °F (36.9 °C)] 98.4 °F (36.9 °C)  Pulse:  [65-72] 69  Resp:  [14-20] 15  SpO2:  [96 %-98 %] 96 %  BP: (129-154)/(60-84) 150/67     Weight: 79 kg (174 lb 2.6 oz)  Body mass index is 24.99 kg/m².     Physical Exam  Vitals reviewed.   HENT:      Head: Normocephalic and atraumatic.      Mouth/Throat:      Mouth: Mucous membranes are dry.   Cardiovascular:      Rate and Rhythm: Normal rate and regular rhythm.      Pulses: Normal pulses.      Heart sounds: Normal heart sounds.   Pulmonary:      Effort: Pulmonary effort is normal.      Breath sounds: Normal breath sounds.   Abdominal:      General: Bowel sounds are normal.      Palpations: Abdomen is soft.   Musculoskeletal:      Cervical back: Normal range of motion.   Skin:     General: Skin is warm and dry.      Capillary Refill: Capillary refill takes less than 2 seconds.   Neurological:      General: No focal deficit present.      Mental Status: He is alert and oriented to person, place, and time.   Psychiatric:         Mood and Affect: Mood normal.         Behavior: Behavior normal.                Significant Labs: All pertinent labs within the past 24 hours have been reviewed.  Recent Lab Results         02/15/24  1106   02/15/24  1021        Albumin 2.0         ALP 62         ALT 32         Anion Gap 15         AST 78         Baso # 0.02         Basophil % 0.2         BILIRUBIN TOTAL 0.5  Comment: For infants and newborns, interpretation of results should be based  on gestational age, weight and in agreement with clinical  observations.    Premature Infant recommended reference ranges:  Up to 24 hours.............<8.0 mg/dL  Up to 48 hours............<12.0 mg/dL  3-5 days..................<15.0 mg/dL  6-29 days.................<15.0 mg/dL           BUN 63         Calcium 8.3         Chloride 99         CO2 18          Creatinine 9.4         Differential Method Automated         eGFR 6         Eos # 0.0         Eos % 0.0         Glucose 103         Gran # (ANC) 6.8         Gran % 80.8         Hematocrit 21.6         Hemoglobin 7.2         Immature Grans (Abs) 0.03  Comment: Mild elevation in immature granulocytes is non specific and   can be seen in a variety of conditions including stress response,   acute inflammation, trauma and pregnancy. Correlation with other   laboratory and clinical findings is essential.           Immature Granulocytes 0.4         Lymph # 0.7         Lymph % 8.1         Magnesium  1.9         MCH 28.6         MCHC 33.3         MCV 86         Mono # 0.9         Mono % 10.5         MPV 10.9         nRBC 0         QRS Duration   174       OHS QTC Calculation   550       Phosphorus Level 6.9         Platelet Count 202         Potassium 4.0         PROTEIN TOTAL 6.1         RBC 2.52         RDW 13.2         Sodium 132         WBC 8.47                 Significant Imaging: I have reviewed all pertinent imaging results/findings within the past 24 hours.  I have reviewed and interpreted all pertinent imaging results/findings within the past 24 hours.

## 2024-02-16 NOTE — CONSULTS
Neurosurgery consulted for this 62 yo male with low back pain following slip and falls. Found to have right L4 transverse process fracture.     No neurosurgical intervention necessary for the right L4 transverse fracture.    OK to mobilize with PT without brace.    No neurosurgical intervention necessary.    FU outpatient with neurosurgery in 4-6 weeks with lumbar spine MRI.    Chris Wong MD

## 2024-02-16 NOTE — PT/OT/SLP PROGRESS
Physical Therapy      Patient Name:  Domingo Gross   MRN:  [id number]    Patient not seen today secondary to pending blood transfusion following dialysis. H&H 6.2/18.6  Will follow-up as able.

## 2024-02-16 NOTE — PLAN OF CARE
VN note: progress notes, labs and vital signs reviewed. Will be available to intervene if needed.     Problem: Device-Related Complication Risk (Hemodialysis)  Goal: Safe, Effective Therapy Delivery  Outcome: Ongoing, Progressing

## 2024-02-16 NOTE — ED NOTES
Bed: EXAM 21  Expected date:   Expected time:   Means of arrival:   Comments:  Renato returning from dialysis

## 2024-02-16 NOTE — PROGRESS NOTES
HD Note   02/16/24 1110   Post-Hemodialysis Assessment   Rinseback Volume (mL) 500 mL   Blood Volume Processed (Liters) 54 L   Dialyzer Clearance Clear   Duration of Treatment 180 minutes   Additional Fluid Intake (mL) 100 mL   Total UF (mL) 1500 mL   Net Fluid Removal 1000   Patient Response to Treatment Patient tolerated treatment well.   Post-Hemodialysis Comments Patient voiced no complaints post treatment.

## 2024-02-16 NOTE — ASSESSMENT & PLAN NOTE
Patient's anemia is currently controlled. Has not received any PRBCs to date. Etiology likely d/t acute blood loss which was from anticoagulation   and left perinephric hematoma hematoma and chronic disease due to Chronic Kidney Disease/ESRD  Current CBC reviewed-   Lab Results   Component Value Date    HGB 7.2 (L) 02/15/2024    HCT 21.6 (L) 02/15/2024     Monitor serial CBC and transfuse if patient becomes hemodynamically unstable, symptomatic or H/H drops below 7/21.   Check H&H in a.m.   Holding Eliquis and Plavix today

## 2024-02-16 NOTE — ASSESSMENT & PLAN NOTE
CT L-spine  nondisplaced L4 right transverse process fracture   Consult PT   Consider ortho/neuro consultation   Pain control   Secondary to fall

## 2024-02-16 NOTE — PLAN OF CARE
SW met with pt at bedside to complete DCA. Pt reported that he was having 0/10 pain and was in a pleasant mood throughout assessment. Pt stated that he lives at home with his wife Karmen [phone] and will have her help transport him home at time of d/c. Pt does not use any HME at home and is fully independent in his ADL's. Pt still drives and is not current with any HH. Pt is current with [facility]  [phone] MWF. Pt did not have any additional questions or concerns for sw at this time. White board updated with CM name and contact information.  Discharge brochure provided.  Pt encouraged to call with any questions or concerns.  Cm will continue to follow pt through transitions of care and assist with any discharge needs.    Kerwin Jairo, MSW  [phone]    Future Appointments   Date Time Provider Department Center   2/20/2024  8:00 AM Analy Encarnacion MD [facility]   [facility]   3/1/2024  3:20 PM Enoch Tirado MD [facility] CARDIO [place] [facility]   3/5/2024  2:40 PM Analy Encarnacion MD [facility]   3/7/2024 11:00 AM Loly Payne MD [facility]   3/13/2024  9:30 AM APPOINTMENT LAB, [place] [facility] LAB [place] [facility]   3/13/2024 10:00 AM Robby Reddy MD [facility] HEM ONC [place] [facility]   3/28/2024  1:00 PM [facility] MRI1 500 LB LIMIT [facility] MRI [place] [facility]   4/4/2024 10:00 AM Cynthia Diaz PA-C [facility] NEUROSU [place] [facility]        02/16/24 9036   Discharge Planning   Assessment Type Discharge Planning Brief Assessment   Resource/Environmental Concerns none   Support Systems Spouse/significant other   Equipment Currently Used at Home none   Current Living Arrangements home   Care Facility Name home   Patient/Family Anticipates Transition to home with family   Patient/Family Anticipated Services at Transition none   DME Needed Upon Discharge  other (see comments)  (TBD)   Discharge Plan A Home with family

## 2024-02-17 VITALS
TEMPERATURE: 97 F | BODY MASS INDEX: 24.94 KG/M2 | WEIGHT: 174.19 LBS | RESPIRATION RATE: 18 BRPM | DIASTOLIC BLOOD PRESSURE: 64 MMHG | SYSTOLIC BLOOD PRESSURE: 137 MMHG | HEIGHT: 70 IN | OXYGEN SATURATION: 95 % | HEART RATE: 66 BPM

## 2024-02-17 LAB
ANION GAP SERPL CALC-SCNC: 12 MMOL/L (ref 8–16)
ANION GAP SERPL CALC-SCNC: 12 MMOL/L (ref 8–16)
BASOPHILS # BLD AUTO: 0.04 K/UL (ref 0–0.2)
BASOPHILS # BLD AUTO: 0.04 K/UL (ref 0–0.2)
BASOPHILS NFR BLD: 0.6 % (ref 0–1.9)
BASOPHILS NFR BLD: 0.6 % (ref 0–1.9)
BUN SERPL-MCNC: 24 MG/DL (ref 8–23)
BUN SERPL-MCNC: 24 MG/DL (ref 8–23)
CALCIUM SERPL-MCNC: 7.7 MG/DL (ref 8.7–10.5)
CALCIUM SERPL-MCNC: 7.7 MG/DL (ref 8.7–10.5)
CHLORIDE SERPL-SCNC: 99 MMOL/L (ref 95–110)
CHLORIDE SERPL-SCNC: 99 MMOL/L (ref 95–110)
CO2 SERPL-SCNC: 23 MMOL/L (ref 23–29)
CO2 SERPL-SCNC: 23 MMOL/L (ref 23–29)
CREAT SERPL-MCNC: 4.6 MG/DL (ref 0.5–1.4)
CREAT SERPL-MCNC: 4.6 MG/DL (ref 0.5–1.4)
DIFFERENTIAL METHOD BLD: ABNORMAL
DIFFERENTIAL METHOD BLD: ABNORMAL
EOSINOPHIL # BLD AUTO: 0.1 K/UL (ref 0–0.5)
EOSINOPHIL # BLD AUTO: 0.1 K/UL (ref 0–0.5)
EOSINOPHIL NFR BLD: 1.7 % (ref 0–8)
EOSINOPHIL NFR BLD: 1.7 % (ref 0–8)
ERYTHROCYTE [DISTWIDTH] IN BLOOD BY AUTOMATED COUNT: 13.9 % (ref 11.5–14.5)
ERYTHROCYTE [DISTWIDTH] IN BLOOD BY AUTOMATED COUNT: 13.9 % (ref 11.5–14.5)
EST. GFR  (NO RACE VARIABLE): 14 ML/MIN/1.73 M^2
EST. GFR  (NO RACE VARIABLE): 14 ML/MIN/1.73 M^2
GLUCOSE SERPL-MCNC: 87 MG/DL (ref 70–110)
GLUCOSE SERPL-MCNC: 87 MG/DL (ref 70–110)
HCT VFR BLD AUTO: 21.1 % (ref 40–54)
HCT VFR BLD AUTO: 21.1 % (ref 40–54)
HGB BLD-MCNC: 7 G/DL (ref 14–18)
HGB BLD-MCNC: 7 G/DL (ref 14–18)
IMM GRANULOCYTES # BLD AUTO: 0.04 K/UL (ref 0–0.04)
IMM GRANULOCYTES # BLD AUTO: 0.04 K/UL (ref 0–0.04)
IMM GRANULOCYTES NFR BLD AUTO: 0.6 % (ref 0–0.5)
IMM GRANULOCYTES NFR BLD AUTO: 0.6 % (ref 0–0.5)
LYMPHOCYTES # BLD AUTO: 1.3 K/UL (ref 1–4.8)
LYMPHOCYTES # BLD AUTO: 1.3 K/UL (ref 1–4.8)
LYMPHOCYTES NFR BLD: 18.3 % (ref 18–48)
LYMPHOCYTES NFR BLD: 18.3 % (ref 18–48)
MAGNESIUM SERPL-MCNC: 1.7 MG/DL (ref 1.6–2.6)
MCH RBC QN AUTO: 28 PG (ref 27–31)
MCH RBC QN AUTO: 28 PG (ref 27–31)
MCHC RBC AUTO-ENTMCNC: 33.2 G/DL (ref 32–36)
MCHC RBC AUTO-ENTMCNC: 33.2 G/DL (ref 32–36)
MCV RBC AUTO: 84 FL (ref 82–98)
MCV RBC AUTO: 84 FL (ref 82–98)
MONOCYTES # BLD AUTO: 1.1 K/UL (ref 0.3–1)
MONOCYTES # BLD AUTO: 1.1 K/UL (ref 0.3–1)
MONOCYTES NFR BLD: 15.3 % (ref 4–15)
MONOCYTES NFR BLD: 15.3 % (ref 4–15)
NEUTROPHILS # BLD AUTO: 4.5 K/UL (ref 1.8–7.7)
NEUTROPHILS # BLD AUTO: 4.5 K/UL (ref 1.8–7.7)
NEUTROPHILS NFR BLD: 63.5 % (ref 38–73)
NEUTROPHILS NFR BLD: 63.5 % (ref 38–73)
NRBC BLD-RTO: 0 /100 WBC
NRBC BLD-RTO: 0 /100 WBC
PHOSPHATE SERPL-MCNC: 4.4 MG/DL (ref 2.7–4.5)
PLATELET # BLD AUTO: 201 K/UL (ref 150–450)
PLATELET # BLD AUTO: 201 K/UL (ref 150–450)
PMV BLD AUTO: 10.6 FL (ref 9.2–12.9)
PMV BLD AUTO: 10.6 FL (ref 9.2–12.9)
POTASSIUM SERPL-SCNC: 3.8 MMOL/L (ref 3.5–5.1)
POTASSIUM SERPL-SCNC: 3.8 MMOL/L (ref 3.5–5.1)
RBC # BLD AUTO: 2.5 M/UL (ref 4.6–6.2)
RBC # BLD AUTO: 2.5 M/UL (ref 4.6–6.2)
SODIUM SERPL-SCNC: 134 MMOL/L (ref 136–145)
SODIUM SERPL-SCNC: 134 MMOL/L (ref 136–145)
WBC # BLD AUTO: 7.06 K/UL (ref 3.9–12.7)
WBC # BLD AUTO: 7.06 K/UL (ref 3.9–12.7)

## 2024-02-17 PROCEDURE — 94761 N-INVAS EAR/PLS OXIMETRY MLT: CPT

## 2024-02-17 PROCEDURE — 97530 THERAPEUTIC ACTIVITIES: CPT

## 2024-02-17 PROCEDURE — 36415 COLL VENOUS BLD VENIPUNCTURE: CPT | Performed by: REGISTERED NURSE

## 2024-02-17 PROCEDURE — 94799 UNLISTED PULMONARY SVC/PX: CPT

## 2024-02-17 PROCEDURE — G0378 HOSPITAL OBSERVATION PER HR: HCPCS

## 2024-02-17 PROCEDURE — 80048 BASIC METABOLIC PNL TOTAL CA: CPT | Performed by: REGISTERED NURSE

## 2024-02-17 PROCEDURE — 97161 PT EVAL LOW COMPLEX 20 MIN: CPT

## 2024-02-17 PROCEDURE — 25000003 PHARM REV CODE 250: Performed by: REGISTERED NURSE

## 2024-02-17 PROCEDURE — 84100 ASSAY OF PHOSPHORUS: CPT | Performed by: REGISTERED NURSE

## 2024-02-17 PROCEDURE — 83735 ASSAY OF MAGNESIUM: CPT | Performed by: REGISTERED NURSE

## 2024-02-17 PROCEDURE — 99900035 HC TECH TIME PER 15 MIN (STAT)

## 2024-02-17 PROCEDURE — 96372 THER/PROPH/DIAG INJ SC/IM: CPT | Performed by: INTERNAL MEDICINE

## 2024-02-17 PROCEDURE — 85025 COMPLETE CBC W/AUTO DIFF WBC: CPT | Performed by: REGISTERED NURSE

## 2024-02-17 PROCEDURE — 63600175 PHARM REV CODE 636 W HCPCS: Mod: JZ,JG | Performed by: INTERNAL MEDICINE

## 2024-02-17 RX ORDER — HYDROCODONE BITARTRATE AND ACETAMINOPHEN 5; 325 MG/1; MG/1
1 TABLET ORAL EVERY 8 HOURS PRN
Qty: 21 TABLET | Refills: 0 | Status: ON HOLD | OUTPATIENT
Start: 2024-02-17 | End: 2024-05-20 | Stop reason: HOSPADM

## 2024-02-17 RX ADMIN — ISOSORBIDE MONONITRATE 30 MG: 30 TABLET, EXTENDED RELEASE ORAL at 09:02

## 2024-02-17 RX ADMIN — LEVOTHYROXINE SODIUM 75 MCG: 75 TABLET ORAL at 06:02

## 2024-02-17 RX ADMIN — CARVEDILOL 25 MG: 25 TABLET, FILM COATED ORAL at 09:02

## 2024-02-17 RX ADMIN — EPOETIN ALFA-EPBX 10000 UNITS: 10000 INJECTION, SOLUTION INTRAVENOUS; SUBCUTANEOUS at 03:02

## 2024-02-17 RX ADMIN — AMLODIPINE BESYLATE 5 MG: 5 TABLET ORAL at 09:02

## 2024-02-17 RX ADMIN — HYDRALAZINE HYDROCHLORIDE 50 MG: 25 TABLET, FILM COATED ORAL at 06:02

## 2024-02-17 RX ADMIN — HYDRALAZINE HYDROCHLORIDE 50 MG: 25 TABLET, FILM COATED ORAL at 03:02

## 2024-02-17 NOTE — PLAN OF CARE
[place] - Telemetry  Discharge Final Note    Primary Care Provider: Analy Encarnacion MD    Expected Discharge Date:     Final Discharge Note (most recent)       Final Note - 02/17/24 1459          Final Note    Assessment Type Final Discharge Note (P)      Anticipated Discharge Disposition Home or Self Care (P)      Hospital Resources/Appts/Education Provided Appointments scheduled and added to AVS (P)                      Contact Info       Analy Encarnacion MD   Specialty: Internal Medicine   Relationship: PCP - General  Hypertension Digital Medicine Responsible Provider    [address] [place] [address]  [place] [address]   Phone: [phone]       Next Steps: Follow up on 2/20/2024    Instructions: at 8:00 AM; PCP hospital follow up appointment    Enoch Tirado MD   Specialty: Cardiovascular Disease    [address]  [address]  [place] [address]   Phone: [phone]       Next Steps: Follow up on 3/1/2024    Instructions: at 3:20pm; cardiology appointment    Loly Payne MD   Specialty: Nephrology    [address]  [address]  [facility]  [place] [address]   Phone: [phone]       Next Steps: Follow up on 3/7/2024    Instructions: at 11:00 AM; nephrology appointment    Robby Reddy MD   Specialty: Medical Oncology, Hematology and Oncology    [address]  [address]  [place] [address]   Phone: [phone]       Next Steps: Follow up on 3/13/2024    Instructions: at 10:00 AM; hem/onc appointment    Cynthia Diaz PA-C   Specialty: Neurosurgery, Spine Surgery    [address]  [address]  [place] [address]   Phone: [phone]       Next Steps: Follow up on 4/4/2024    Instructions: at 10:00 AM; neuro-surg appointment

## 2024-02-17 NOTE — ASSESSMENT & PLAN NOTE
Patient's anemia is currently controlled. Has not received any PRBCs to date. Etiology likely d/t acute blood loss which was from anticoagulation   and left perinephric hematoma hematoma and chronic disease due to Chronic Kidney Disease/ESRD  Current CBC reviewed-   Lab Results   Component Value Date    HGB 7.0 (L) 02/17/2024    HGB 7.0 (L) 02/17/2024    HCT 21.1 (L) 02/17/2024    HCT 21.1 (L) 02/17/2024     Monitor serial CBC and transfuse if patient becomes hemodynamically unstable, symptomatic or H/H drops below 7/21.    [facility]

## 2024-02-17 NOTE — DISCHARGE SUMMARY
[facility]  Discharge Summary      Patient Name: Domingo Gross  MRN: [id number]  NAOMI: [id number]  Patient Class: OP- Observation  Admission Date: 2/15/2024  Hospital Length of Stay: 0 days  Discharge Date and Time:  02/17/2024 3:35 PM  Attending Physician: Rupinder Perez MD   Discharging Provider: Rupinder Perez MD  Primary Care Provider: Analy Encarnacion MD    Primary Care Team: Networked reference to record PCT     HPI:   Patient is a 63-year-old male with a history of ESRD who said while in [facility] 2 days ago his legs became weak causing him to slide down the counter onto the floor, he denies LOC.  Patient reports he was unable to get dialysis that day. His last dialysis was 5 days ago. He began to experience pain to his lower back yesterday. No numbness. No bowel incontinence. He denies chest pain or shortness of breath. No fever or cough.  In ED today labs remarkable for H&H 7.2 and 21.6 which has continued to trend down over the last month.Patient had HD today and continue to complain of back pain, CT L-spine showed a nondisplaced right L4 transverse process fracture, additionally  a  partial visualized left perinephric hematoma.  CTA of the abdomen and pelvis was then performed which showed no extravasation of contrast  of left perinephric hematoma thought to be from recent renal biopsy. Patient's pain has improved with narcotics in ED. Patient will be admitted to Hospital Medicine will consult Nephrology who will likely perform additional HD in a.m. given his multiple missed dialysis sessions    * No surgery found *      Hospital Course:   Pt admitted and was taken for dialysis. Also given 1u prbcs with dialysis.\     Neurosurgery consult:  No neurosurgical intervention necessary for the right L4 transverse fracture.  OK to mobilize with PT without brace.   No neurosurgical intervention necessary.   FU outpatient with neurosurgery in 4-6 weeks with lumbar spine  MRI.      Pt mobilized well with PT without further recs. Deemed stable to be d/c home     Goals of Care Treatment Preferences:  Code Status: Full Code      Consults:   Consults (From admission, onward)          Status Ordering Provider     Inpatient consult to Neurosurgery  Once        Provider:  (Not yet assigned)    MARILYNN Vincent     Inpatient consult to Nephrology-[facility]  Once        Provider:  Loly Payne MD    Acknowledged JESSICA ANTONIO            Neuro  * Closed fracture of transverse process of lumbar vertebra   CT L-spine  nondisplaced L4 right transverse process fracture   Consult PT     Pain control   Secondary to fall     Neurosurgery consult:  No neurosurgical intervention necessary for the right L4 transverse fracture.  OK to mobilize with PT without brace.   No neurosurgical intervention necessary.   FU outpatient with neurosurgery in 4-6 weeks with lumbar spine MRI.      Pt mobilized well with PT without further recs. Deemed stable to be d/c home    Pulmonary  COPD (chronic obstructive pulmonary disease)  Patient's COPD is controlled currently.  Patient is currently off COPD Pathway. Continue scheduled inhalers Steroids and monitor respiratory status closely.     Cardiac/Vascular  HTN (hypertension)  Chronic, controlled. Latest blood pressure and vitals reviewed-     Temp:  [97.4 °F (36.3 °C)-98.9 °F (37.2 °C)]   Pulse:  [60-75]   Resp:  [14-19]   BP: (113-149)/(58-67)   SpO2:  [94 %-98 %] .   Home meds for hypertension were reviewed and noted below.   Hypertension Medications               amLODIPine (NORVASC) 5 MG tablet Take 1 tablet (5 mg total) by mouth once daily.    carvediloL (COREG) 25 MG tablet Take 1 tablet (25 mg total) by mouth 2 (two) times daily with meals.    eplerenone (INSPRA) 50 MG Tab Take 1 tablet (50 mg total) by mouth once daily.    hydrALAZINE (APRESOLINE) 50 MG tablet Take 1 tablet (50 mg total) by mouth  every 8 (eight) hours.    isosorbide mononitrate (IMDUR) 30 MG 24 hr tablet Take 1 tablet (30 mg total) by mouth once daily.    nitroGLYCERIN (NITROSTAT) 0.4 MG SL tablet Place 0.4 mg under the tongue every 5 (five) minutes as needed for Chest pain.            While in the hospital, will manage blood pressure as follows; Continue home antihypertensive regimen    Will utilize p.r.n. blood pressure medication only if patient's blood pressure greater than 180/110 and he develops symptoms such as worsening chest pain or shortness of breath.    Renal/  ESRD (end stage renal disease)  Creatine stable for now. BMP reviewed- noted Estimated Creatinine Clearance: 17.2 mL/min (A) (based on SCr of 4.6 mg/dL (H)). according to latest data. Based on current GFR, CKD stage is end stage.  Monitor UOP and serial BMP and adjust therapy as needed. Renally dose meds. Avoid nephrotoxic medications and procedures.    Dialysis by nephro      Oncology  ABLA (acute blood loss anemia)  Patient's anemia is currently controlled. Has not received any PRBCs to date. Etiology likely d/t acute blood loss which was from anticoagulation   and left perinephric hematoma hematoma and chronic disease due to Chronic Kidney Disease/ESRD  Current CBC reviewed-   Lab Results   Component Value Date    HGB 7.0 (L) 02/17/2024    HGB 7.0 (L) 02/17/2024    HCT 21.1 (L) 02/17/2024    HCT 21.1 (L) 02/17/2024     Monitor serial CBC and transfuse if patient becomes hemodynamically unstable, symptomatic or H/H drops below 7/21.    s/p 1 u [facility]    Orthopedic  Fall   Concern for syncope   Suspicion is secondary to ABLA and orthostatic   monitor H&H, transfuse as needed   Patient does have acute nondisplaced lumbar fracture   Fall precautions          Final Active Diagnoses:    Diagnosis Date Noted POA    PRINCIPAL PROBLEM:  Closed fracture of transverse process of lumbar vertebra [S32.009A] 02/16/2024 Yes    ABLA (acute blood loss anemia) [D62] 02/16/2024 Yes     ESRD (end stage renal disease) [N18.6] 02/16/2024 Yes    Fall [W19.XXXA] 02/16/2024 Yes    COPD (chronic obstructive pulmonary disease) [J44.9] 08/20/2021 Yes     Chronic    HTN (hypertension) [I10] 04/29/2013 Yes      Problems Resolved During this Admission:       Discharged Condition: stable    Disposition: Home or Self Care    Follow Up:   Follow-up Information       Analy Encarnacion MD Follow up on 2/20/2024.    Specialty: Internal Medicine  Why: at 8:00 AM; PCP hospital follow up appointment  Contact information:  [address]  [place] [address]  [phone]               Enoch Tirado MD Follow up on 3/1/2024.    Specialty: Cardiovascular Disease  Why: at 3:20pm; cardiology appointment  Contact information:  [address]  [address]  [place] [address]  [phone]               Loly Payne MD Follow up on 3/7/2024.    Specialty: Nephrology  Why: at 11:00 AM; nephrology appointment  Contact information:  [address]  [address]  [facility]  [place] [address]  [phone]               Robby Reddy MD Follow up on 3/13/2024.    Specialties: Medical Oncology, Hematology and Oncology  Why: at 10:00 AM; hem/onc appointment  Contact information:  [address]  [address]  [place] [address]  [phone]               Cynthia Diaz PAEstevanC Follow up on 4/4/2024.    Specialties: Neurosurgery, Spine Surgery  Why: at 10:00 AM; neuro-surg appointment  Contact information:  [address]  [address]  [place] [address]  [phone]                           Patient Instructions:      Ambulatory referral/consult to Physical/Occupational Therapy   Standing Status: Future   Referral Priority: Routine Referral Type: Physical Medicine   Referral Reason: Specialty Services Required   Number of Visits Requested: 1     Diet Adult Regular     Notify your health care provider if you experience any of the following:  temperature >100.4     Notify your health care  provider if you experience any of the following:  persistent nausea and vomiting or diarrhea     Notify your health care provider if you experience any of the following:  severe uncontrolled pain     Activity as tolerated       Significant Diagnostic Studies: Labs: BMP:   Recent Labs   Lab 02/16/24  0650 02/17/24  0249   GLU 95 87  87   * 134*  134*   K 4.1 3.8  3.8   CL 99 99  99   CO2 22* 23  23   BUN 38* 24*  24*   CREATININE 6.3* 4.6*  4.6*   CALCIUM 7.8* 7.7*  7.7*   MG 1.7 1.7    and CBC   Recent Labs   Lab 02/16/24  0650 02/17/24  0249   WBC 7.31 7.06  7.06   HGB 6.2* 7.0*  7.0*   HCT 18.6* 21.1*  21.1*    201  201       Pending Diagnostic Studies:       Procedure Component Value Units Date/Time    MRI Lumbar Spine Without Contrast [[id number]]     Order Status: Sent Lab Status: No result     Phospholipase A2 Receptor AB, Serum [[id number]] Collected: 02/15/24 2142    Order Status: Sent Lab Status: In process Updated: 02/16/24 0132    Specimen: Blood            Medications:  Reconciled Home Medications:      Medication List        START taking these medications      HYDROcodone-acetaminophen 5-325 mg per tablet  Commonly known as: NORCO  Take 1 tablet by mouth every 8 (eight) hours as needed for Pain.            CONTINUE taking these medications      albuterol 90 mcg/actuation inhaler  Commonly known as: PROVENTIL/VENTOLIN HFA  INHALE 2 PUFFS INTO THE LUNGS EVERY 6 HOURS AS NEEDED FOR WHEEZING     albuterol-ipratropium 2.5 mg-0.5 mg/3 mL nebulizer solution  Commonly known as: DUO-NEB  Take 3 mLs by nebulization every 6 (six) hours as needed for Wheezing or Shortness of Breath (or cough). Rescue     amLODIPine 5 MG tablet  Commonly known as: NORVASC  Take 1 tablet (5 mg total) by mouth once daily.     carvediloL 25 MG tablet  Commonly known as: COREG  Take 1 tablet (25 mg total) by mouth 2 (two) times daily with meals.     clopidogreL 75 mg tablet  Commonly known as: PLAVIX  Take 1  tablet (75 mg total) by mouth once daily.     coenzyme Q10 100 mg capsule  Take 100 mg by mouth once daily.     ELIQUIS 5 mg Tab  Generic drug: apixaban  Take 1 tablet (5 mg total) by mouth 2 (two) times daily.     eplerenone 50 MG Tab  Commonly known as: INSPRA  Take 1 tablet (50 mg total) by mouth once daily.     hydrALAZINE 50 MG tablet  Commonly known as: APRESOLINE  Take 1 tablet (50 mg total) by mouth every 8 (eight) hours.     hydrOXYzine pamoate 25 MG Cap  Commonly known as: VISTARIL  Take 1 capsule (25 mg total) by mouth nightly as needed (Insomnia/Anxiety).     isosorbide mononitrate 30 MG 24 hr tablet  Commonly known as: IMDUR  Take 1 tablet (30 mg total) by mouth once daily.     levothyroxine 75 MCG tablet  Commonly known as: SYNTHROID  Take 1 tablet (75 mcg total) by mouth before breakfast.     LIDOcaine 5 %  Commonly known as: LIDODERM  Place 1 patch onto the skin daily as needed (back pain). Remove & Discard patch within 12 hours or as directed by MD     nitroGLYCERIN 0.4 MG SL tablet  Commonly known as: NITROSTAT  Place 0.4 mg under the tongue every 5 (five) minutes as needed for Chest pain.     ranolazine 500 MG Tb12  Commonly known as: RANEXA  Take 1 tablet (500 mg total) by mouth 2 (two) times daily.     rosuvastatin 40 MG Tab  Commonly known as: CRESTOR  Take 1 tablet (40 mg total) by mouth every evening.     sodium bicarbonate 650 MG tablet  Take 1 tablet (650 mg total) by mouth once daily. for 30 doses              Indwelling Lines/Drains at time of discharge:   Lines/Drains/Airways       Central Venous Catheter Line  Duration             Percutaneous Central Line Insertion/Assessment - Triple Lumen  02/04/24 1051 Internal Jugular Right 13 days    Tunneled Central Line Insertion/Assessment - Double Lumen  02/08/24 1000 9 days                    Time spent on the discharge of patient: >30 minutes         Rupinder Perez MD  Department of Hospital Medicine  [facility]

## 2024-02-17 NOTE — PLAN OF CARE
1425  CM was informed by Dr Perez that the pt is medically stable to discharge home today. Multiple hospfu appts scheduled. Information added to the pt's discharge paperwork.     1445  Patient resting quietly in bed when CM rounded via [employer]. No family present. Patient in agreement with plan to discharge home today, denied the need for assistance with transportation at time of discharge, & verbalized understanding regarding the hospital follow up appointments.    1450  Message sent to nurse Leonard & virtual nurse Aron informing that the pt is cleared to discharge.     Will continue to follow.

## 2024-02-17 NOTE — ASSESSMENT & PLAN NOTE
Chronic, controlled. Latest blood pressure and vitals reviewed-     Temp:  [97.4 °F (36.3 °C)-98.9 °F (37.2 °C)]   Pulse:  [60-75]   Resp:  [14-19]   BP: (113-149)/(58-67)   SpO2:  [94 %-98 %] .   Home meds for hypertension were reviewed and noted below.   Hypertension Medications               amLODIPine (NORVASC) 5 MG tablet Take 1 tablet (5 mg total) by mouth once daily.    carvediloL (COREG) 25 MG tablet Take 1 tablet (25 mg total) by mouth 2 (two) times daily with meals.    eplerenone (INSPRA) 50 MG Tab Take 1 tablet (50 mg total) by mouth once daily.    hydrALAZINE (APRESOLINE) 50 MG tablet Take 1 tablet (50 mg total) by mouth every 8 (eight) hours.    isosorbide mononitrate (IMDUR) 30 MG 24 hr tablet Take 1 tablet (30 mg total) by mouth once daily.    nitroGLYCERIN (NITROSTAT) 0.4 MG SL tablet Place 0.4 mg under the tongue every 5 (five) minutes as needed for Chest pain.            While in the hospital, will manage blood pressure as follows; Continue home antihypertensive regimen    Will utilize p.r.n. blood pressure medication only if patient's blood pressure greater than 180/110 and he develops symptoms such as worsening chest pain or shortness of breath.

## 2024-02-17 NOTE — PLAN OF CARE
VN reviewed discharge instructions with pt. Using teach back method.  AVS printed and handed to pt by bedside nurse.  Reviewed follow-up appointments, medications, diet, and importance of medication compliance.  Reviewed home care instructions, treatment plan, self-management, and when to seek medical attention.  Allowed time for questions.  All questions answered.  Patient verbalized complete understanding of discharge instructions and voices no concerns.     Discharge instructions complete.  Printed Rx given to pt. Pt waiting on ride home.  Bedside nurse notified.

## 2024-02-17 NOTE — HOSPITAL COURSE
Pt admitted and was taken for dialysis. Also given 1u prbcs with dialysis.\     Neurosurgery consult:  No neurosurgical intervention necessary for the right L4 transverse fracture.  OK to mobilize with PT without brace.   No neurosurgical intervention necessary.   FU outpatient with neurosurgery in 4-6 weeks with lumbar spine MRI.      Pt mobilized well with PT without further recs. Deemed stable to be d/c home

## 2024-02-17 NOTE — PLAN OF CARE
Problem: Adult Inpatient Plan of Care  Goal: Plan of Care Review  Outcome: Ongoing, Progressing  Flowsheets (Taken 2/16/2024 2314)  Plan of Care Reviewed With: patient     Problem: Renal Function Impairment (Acute Kidney Injury/Impairment)  Goal: Effective Renal Function  Outcome: Ongoing, Progressing  Intervention: Monitor and Support Renal Function  Flowsheets (Taken 2/16/2024 1900)  Stabilization Measures: blood products administered  Medication Review/Management:   medications reviewed   high-risk medications identified     Problem: Fall Injury Risk  Goal: Absence of Fall and Fall-Related Injury  Outcome: Ongoing, Progressing  Intervention: Identify and Manage Contributors  Flowsheets (Taken 2/16/2024 2314)  Self-Care Promotion:   independence encouraged   BADL personal objects within reach   BADL personal routines maintained  Medication Review/Management:   medications reviewed   high-risk medications identified     Problem: Pain (Orthopaedic Fracture)  Goal: Acceptable Pain Control  Outcome: Ongoing, Progressing  Intervention: Manage Acute Orthopaedic-Related Pain  Flowsheets (Taken 2/16/2024 2314)  Pain Management Interventions:   pain management plan reviewed with patient/caregiver   quiet environment facilitated   relaxation techniques promoted   care clustered     Problem: COPD (Chronic Obstructive Pulmonary Disease) Comorbidity  Goal: Maintenance of COPD Symptom Control  Outcome: Ongoing, Progressing  Intervention: Maintain COPD-Symptom Control  Flowsheets (Taken 2/16/2024 2314)  Supportive Measures:   active listening utilized   relaxation techniques promoted   verbalization of feelings encouraged   self-care encouraged  Medication Review/Management:   medications reviewed   high-risk medications identified   Plan of care progressing.

## 2024-02-17 NOTE — ASSESSMENT & PLAN NOTE
CT L-spine  nondisplaced L4 right transverse process fracture   Consult PT     Pain control   Secondary to fall     Neurosurgery consult:  No neurosurgical intervention necessary for the right L4 transverse fracture.  OK to mobilize with PT without brace.   No neurosurgical intervention necessary.   FU outpatient with neurosurgery in 4-6 weeks with lumbar spine MRI.      Pt mobilized well with PT without further recs. Deemed stable to be d/c home

## 2024-02-17 NOTE — ASSESSMENT & PLAN NOTE
Creatine stable for now. BMP reviewed- noted Estimated Creatinine Clearance: 17.2 mL/min (A) (based on SCr of 4.6 mg/dL (H)). according to latest data. Based on current GFR, CKD stage is end stage.  Monitor UOP and serial BMP and adjust therapy as needed. Renally dose meds. Avoid nephrotoxic medications and procedures.    Dialysis by nephro

## 2024-02-18 NOTE — PLAN OF CARE
Problem: Physical Therapy  Goal: Physical Therapy Goal  Description: Goals to be met by: 2024    Patient will increase functional independence with mobility by performin. Sit<>stand with Mod independent with No AD. MET 2024  2. Gait x 100 feet with No AD independently. MET 2024  3. Supine<>sit Mod Independent. MET 2024     Outcome: Met   Orders received and Physical Therapy evaluation completed.  Pt Met all goals.  Recommendations: Low Intensity Therapy.

## 2024-02-18 NOTE — PT/OT/SLP EVAL
Physical Therapy Evaluation, Treatment and Discharge Note    Patient Name:  Domingo Gross   MRN:  [id number]    Recommendations:     Discharge Recommendations:    Discharge Equipment Recommendations: none   Barriers to discharge: None    Assessment:     Domingo Gross is a 63 y.o. male admitted with a medical diagnosis of Closed fracture of transverse process of lumbar vertebra. .  At this time, patient is functioning at their prior level of function and does not require further acute PT services.     Recent Surgery: * No surgery found *      Plan:     During this hospitalization, patient does not require further acute PT services.  Please re-consult if situation changes.      Subjective     Chief Complaint: Back pain  Patient/Family Comments/goals: Pt would like to get out of the hospital and back to work.  Pain/Comfort:  Pain Rating 1: 5/10  Location 1: back  Pain Addressed 1: Distraction  Pain Rating Post-Intervention 1: 5/10    Patients cultural, spiritual, Temple conflicts given the current situation: no    Living Environment:  Pt lives in a single story home with 6 steps to enter with his wife and grandson.  Prior to admission, patients level of function was Independent.  Equipment used at home: none.  DME owned (not currently used): none.  Upon discharge, patient will have assistance from his wife and grandson.    Objective:     Communicated with Nurse prior to session.  Patient found ambulatory in room/kulkarni with   upon PT entry to room.    General Precautions: Standard, fall    Orthopedic Precautions:N/A   Braces: N/A  Respiratory Status: Room air    Exams:  Cognitive Exam:  Patient is oriented to Person, Place, Time, and Situation  Sensation:    -       Intact  RLE ROM: WFL except Hip flexion to 105 degrees  RLE Strength: WFL except Hip flexion painful  LLE ROM: WFL except Hip flexoin to 90 degrees   LLE Strength: WFL except Hp flexion painful    Functional Mobility:  Bed Mobility:     Supine to  Sit: modified independence  Sit to Supine: modified independence  Transfers:     Sit to Stand:  modified independence with no AD  Gait: pt was able to ambulate 100 ft with no AD independently    AM-PAC 6 CLICK MOBILITY  Total Score:23       Treatment and Education:  Role of Physical Therapy  Plan of Care  Physical Therapy evaluation   Exercise supine heel slides  Abdominal bracing   Gluteal sets  Discussion of job requirements and preparation for return to work related activities        AM-PAC 6 CLICK MOBILITY  Total Score:23     Patient left HOB elevated with all lines intact, call button in reach, bed alarm on, and Nurse notified.    GOALS:   Multidisciplinary Problems       Physical Therapy Goals       Not on file              Multidisciplinary Problems (Resolved)          Problem: Physical Therapy    Goal Priority Disciplines Outcome Goal Variances Interventions   Physical Therapy Goal   (Resolved)     PT, PT/OT Met     Description: Goals to be met by: 2024    Patient will increase functional independence with mobility by performin. Sit<>stand with Mod independent with No AD.  2. Gait x 100 feet with No AD independently.  3. Supine<>sit Mod Independent                          History:     Past Medical History:   Diagnosis Date    Allergy     Anemia     Anticoagulant long-term use     Arthritis     CKD (chronic kidney disease) stage 4, GFR 15-29 ml/min     COPD (chronic obstructive pulmonary disease) 2021    Coronary artery disease     Heart attack 10/04/2019    Hematuria     Hemothorax     Hyperlipidemia     Hypertension     Hyperuricemia     Hypocalcemia     Hypokalemia     Hypophosphatemia     Hypothyroidism 3/2/2023    PAD (peripheral artery disease)     Proteinuria     Vitamin D deficiency        Past Surgical History:   Procedure Laterality Date    ABDOMINAL AORTOGRAPHY N/A 5/10/2019    Procedure: AORTOGRAM-ABDOMINAL;  Surgeon: Keo Jenkins MD;  Location: [facility] CATH LAB/EP;  Service:  Cardiology;  Laterality: N/A;  RSFA intervention     AORTOGRAPHY WITH SERIALOGRAPHY N/A 12/3/2021    Procedure: AORTOGRAM, WITH SERIALOGRAPHY;  Surgeon: Keo Jenkins MD;  Location: [facility] CATH LAB/EP;  Service: Cardiology;  Laterality: N/A;    AORTOGRAPHY WITH SERIALOGRAPHY N/A 4/1/2022    Procedure: AORTOGRAM, WITH SERIALOGRAPHY;  Surgeon: Keo Jenkins MD;  Location: [facility] CATH LAB/EP;  Service: Cardiology;  Laterality: N/A;    ATHERECTOMY, CORONARY N/A 4/25/2023    Procedure: Atherectomy-coronary;  Surgeon: Keo Jenkins MD;  Location: [facility] CATH LAB/EP;  Service: Cardiology;  Laterality: N/A;    BONE MARROW BIOPSY Right 10/12/2021    Procedure: BIOPSY-BONE MARROW;  Surgeon: Naseem Woods MD;  Location: [facility] OR;  Service: Oncology;  Laterality: Right;    CARDIAC CATHETERIZATION      CATARACT EXTRACTION      CATARACT EXTRACTION W/  INTRAOCULAR LENS IMPLANT Right 6/8/2020    Procedure: EXTRACTION, CATARACT, WITH IOL INSERTION;  Surgeon: Tin Light MD;  Location: [facility] OR;  Service: Ophthalmology;  Laterality: Right;    CATARACT EXTRACTION W/  INTRAOCULAR LENS IMPLANT Left 7/2/2020    Procedure: EXTRACTION, CATARACT, WITH IOL INSERTION;  Surgeon: Tin Light MD;  Location: [facility] OR;  Service: Ophthalmology;  Laterality: Left;    COLONOSCOPY N/A 1/6/2022    Procedure: COLONOSCOPY;  Surgeon: Kathe Penaloza MD;  Location: [address]);  Service: Endoscopy;  Laterality: N/A;  COVID test at [facility] on 1/3-GT  okay to hold Plavix for 5 days and aspirin per Dr. Becerra  2nd floor due toextensive cardiac history   instructions mailed and my [facility] portal -     CORONARY ANGIOGRAPHY N/A 3/29/2019    Procedure: ANGIOGRAM, CORONARY ARTERY;  Surgeon: Keo Jenkins MD;  Location: [facility] CATH LAB/EP;  Service: Cardiology;  Laterality: N/A;    CORONARY ANGIOGRAPHY Left 10/11/2019    Procedure: ANGIOGRAM, CORONARY ARTERY;  Surgeon: Keo Jenkins MD;  Location: [facility] CATH LAB/EP;  Service: Cardiology;   Laterality: Left;    CORONARY ANGIOGRAPHY N/A 4/10/2023    Procedure: ANGIOGRAM, CORONARY ARTERY;  Surgeon: Keo Jenkins MD;  Location: [facility] CATH LAB/EP;  Service: Cardiology;  Laterality: N/A;    CORONARY ANGIOPLASTY WITH STENT PLACEMENT  03/29/2019    mid and distal RCA    ESOPHAGOGASTRODUODENOSCOPY N/A 1/6/2022    Procedure: EGD (ESOPHAGOGASTRODUODENOSCOPY);  Surgeon: Kathe Penaloza MD;  Location: [address]);  Service: Endoscopy;  Laterality: N/A;    EYE SURGERY      INSERTION OF TUNNELED CENTRAL VENOUS HEMODIALYSIS CATHETER N/A 2/9/2024    Procedure: INSERTION, CATHETER, HEMODIALYSIS, DUAL LUMEN;  Surgeon: Wang Mendieta MD;  Location: [facility] OR;  Service: General;  Laterality: N/A;    INSTANTANEOUS WAVE-FREE RATIO (IFR) N/A 4/25/2023    Procedure: Instantaneous Wave-Free Ratio (IFR);  Surgeon: Keo Jenkins MD;  Location: [facility] CATH LAB/EP;  Service: Cardiology;  Laterality: N/A;    INTRAVASCULAR ULTRASOUND, NON-CORONARY  8/12/2022    Procedure: Intravascular Ultrasound, Non-Coronary;  Surgeon: Keo Jenkins MD;  Location: [facility] CATH LAB/EP;  Service: Cardiology;;    IVUS, CORONARY  4/25/2023    Procedure: IVUS, Coronary;  Surgeon: Keo Jenkins MD;  Location: [facility] CATH LAB/EP;  Service: Cardiology;;    IVUS, CORONARY  5/16/2023    Procedure: IVUS, Coronary;  Surgeon: Keo Jenkins MD;  Location: [facility] CATH LAB/EP;  Service: Cardiology;;  CX    LEFT HEART CATHETERIZATION N/A 3/29/2019    Procedure: Left heart cath;  Surgeon: Keo Jenkins MD;  Location: [facility] CATH LAB/EP;  Service: Cardiology;  Laterality: N/A;    LEFT HEART CATHETERIZATION Left 10/8/2019    Procedure: Left heart cath;  Surgeon: Keo Jenkins MD;  Location: [facility] CATH LAB/EP;  Service: Cardiology;  Laterality: Left;    LEFT HEART CATHETERIZATION Left 4/10/2023    Procedure: Left heart cath;  Surgeon: Keo Jenkins MD;  Location: [facility] CATH LAB/EP;  Service: Cardiology;  Laterality: Left;    LEFT HEART CATHETERIZATION Left  4/25/2023    Procedure: Left heart cath;  Surgeon: Keo Jenkins MD;  Location: [facility] CATH LAB/EP;  Service: Cardiology;  Laterality: Left;    LEFT HEART CATHETERIZATION Left 5/16/2023    Procedure: Left heart cath;  Surgeon: Keo Jenkins MD;  Location: [facility] CATH LAB/EP;  Service: Cardiology;  Laterality: Left;    PERCUTANEOUS TRANSLUMINAL ANGIOPLASTY (PTA) OF PERIPHERAL VESSEL N/A 7/12/2019    Procedure: PTA, PERIPHERAL VESSEL;  Surgeon: Keo Jenkins MD;  Location: [facility] CATH LAB/EP;  Service: Cardiology;  Laterality: N/A;    PERCUTANEOUS TRANSLUMINAL ANGIOPLASTY (PTA) OF PERIPHERAL VESSEL Left 5/20/2022    Procedure: PTA, PERIPHERAL VESSEL;  Surgeon: Keo Jenkins MD;  Location: [facility] CATH LAB/EP;  Service: Cardiology;  Laterality: Left;    PERCUTANEOUS TRANSLUMINAL ANGIOPLASTY (PTA) OF PERIPHERAL VESSEL Right 8/12/2022    Procedure: PTA, PERIPHERAL VESSEL;  Surgeon: Keo Jenkins MD;  Location: [facility] CATH LAB/EP;  Service: Cardiology;  Laterality: Right;    PERCUTANEOUS TRANSLUMINAL BALLOON ANGIOPLASTY OF CORONARY ARTERY  4/25/2023    Procedure: Angioplasty-coronary;  Surgeon: Keo Jenkins MD;  Location: [facility] CATH LAB/EP;  Service: Cardiology;;    PTCA, SINGLE VESSEL  5/16/2023    Procedure: PTCA, Single Vessel;  Surgeon: Keo Jenkins MD;  Location: [facility] CATH LAB/EP;  Service: Cardiology;;  CX    REMOVAL OF HEMODIALYSIS CATHETER Right 2/9/2024    Procedure: REMOVAL, CATHETER, HEMODIALYSIS;  Surgeon: Wang Mendieta MD;  Location: [facility] OR;  Service: General;  Laterality: Right;    STENT, DRUG ELUTING, SINGLE VESSEL, CORONARY  4/25/2023    Procedure: Stent, Drug Eluting, Single Vessel, Coronary;  Surgeon: Keo Jenkins MD;  Location: [facility] CATH LAB/EP;  Service: Cardiology;;    STENT, DRUG ELUTING, SINGLE VESSEL, CORONARY  5/16/2023    Procedure: Stent, Drug Eluting, Single Vessel, Coronary;  Surgeon: Keo Jenkins MD;  Location: [facility] CATH LAB/EP;  Service: Cardiology;;  CX    TRANSESOPHAGEAL  ECHOCARDIOGRAM WITH POSSIBLE CARDIOVERSION (JOSE W/ POSS CARDIOVERSION) N/A 6/19/2023    Procedure: Transesophageal echo (JOSE) intra-procedure log documentation;  Surgeon: Keo Jenkins MD;  Location: [facility] CATH LAB/EP;  Service: Cardiology;  Laterality: N/A;       Time Tracking:     PT Received On: 02/17/24  PT Start Time: 1104     PT Stop Time: 1133  PT Total Time (min): 29 min     Billable Minutes: Evaluation 14 and Therapeutic Activity 15      02/17/2024

## 2024-02-19 LAB — PHOSPHOLIPASE A2 RECEPTOR, ELISA: 638 RU/ML

## 2024-02-20 ENCOUNTER — OFFICE VISIT (OUTPATIENT)
Dept: INTERNAL MEDICINE | Facility: CLINIC | Age: 64
End: 2024-02-20
Payer: COMMERCIAL

## 2024-02-20 VITALS
DIASTOLIC BLOOD PRESSURE: 56 MMHG | HEART RATE: 65 BPM | BODY MASS INDEX: 26.07 KG/M2 | SYSTOLIC BLOOD PRESSURE: 120 MMHG | WEIGHT: 182.13 LBS | HEIGHT: 70 IN

## 2024-02-20 DIAGNOSIS — N02.2 MEMBRANOUS NEPHROPATHY DETERMINED BY BIOPSY: ICD-10-CM

## 2024-02-20 DIAGNOSIS — Z99.2 ESRD (END STAGE RENAL DISEASE) ON DIALYSIS: ICD-10-CM

## 2024-02-20 DIAGNOSIS — I70.213 ATHEROSCLEROSIS OF NATIVE ARTERY OF BOTH LOWER EXTREMITIES WITH INTERMITTENT CLAUDICATION: ICD-10-CM

## 2024-02-20 DIAGNOSIS — I25.111 CORONARY ARTERY DISEASE INVOLVING NATIVE CORONARY ARTERY OF NATIVE HEART WITH ANGINA PECTORIS WITH DOCUMENTED SPASM: ICD-10-CM

## 2024-02-20 DIAGNOSIS — N06.20 ISOLATED PROTEINURIA WITH DIFFUSE MEMBRANOUS GLOMERULONEPHRITIS, UNSPECIFIED TYPE: Primary | ICD-10-CM

## 2024-02-20 DIAGNOSIS — D64.9 CHRONIC ANEMIA: ICD-10-CM

## 2024-02-20 DIAGNOSIS — Z09 HOSPITAL DISCHARGE FOLLOW-UP: Primary | ICD-10-CM

## 2024-02-20 DIAGNOSIS — N18.6 ESRD (END STAGE RENAL DISEASE) ON DIALYSIS: ICD-10-CM

## 2024-02-20 DIAGNOSIS — I95.9 HYPOTENSION, UNSPECIFIED HYPOTENSION TYPE: ICD-10-CM

## 2024-02-20 DIAGNOSIS — S32.049A CLOSED FRACTURE OF FOURTH LUMBAR VERTEBRA, UNSPECIFIED FRACTURE MORPHOLOGY, INITIAL ENCOUNTER: ICD-10-CM

## 2024-02-20 PROCEDURE — 3078F DIAST BP <80 MM HG: CPT | Mod: CPTII,S$GLB,, | Performed by: INTERNAL MEDICINE

## 2024-02-20 PROCEDURE — 1159F MED LIST DOCD IN RCRD: CPT | Mod: CPTII,S$GLB,, | Performed by: INTERNAL MEDICINE

## 2024-02-20 PROCEDURE — 3062F POS MACROALBUMINURIA REV: CPT | Mod: CPTII,S$GLB,, | Performed by: INTERNAL MEDICINE

## 2024-02-20 PROCEDURE — 1160F RVW MEDS BY RX/DR IN RCRD: CPT | Mod: CPTII,S$GLB,, | Performed by: INTERNAL MEDICINE

## 2024-02-20 PROCEDURE — 3066F NEPHROPATHY DOC TX: CPT | Mod: CPTII,S$GLB,, | Performed by: INTERNAL MEDICINE

## 2024-02-20 PROCEDURE — 1111F DSCHRG MED/CURRENT MED MERGE: CPT | Mod: CPTII,S$GLB,, | Performed by: INTERNAL MEDICINE

## 2024-02-20 PROCEDURE — 99999 PR PBB SHADOW E&M-EST. PATIENT-LVL V: CPT | Mod: PBBFAC,,, | Performed by: INTERNAL MEDICINE

## 2024-02-20 PROCEDURE — 99214 OFFICE O/P EST MOD 30 MIN: CPT | Mod: S$GLB,,, | Performed by: INTERNAL MEDICINE

## 2024-02-20 PROCEDURE — 3008F BODY MASS INDEX DOCD: CPT | Mod: CPTII,S$GLB,, | Performed by: INTERNAL MEDICINE

## 2024-02-20 PROCEDURE — 3074F SYST BP LT 130 MM HG: CPT | Mod: CPTII,S$GLB,, | Performed by: INTERNAL MEDICINE

## 2024-02-20 NOTE — PROGRESS NOTES
Patient ID: Domingo Gross is a 63 y.o. male.    Chief Complaint: Follow-up    MEJIA Chirinos is a 63 y.o. male with COPD, CAD and ESRD (on dialysis Tuesday, Thursday, and Saturday) who presents for hospital discharge follow-up.  He presents with his grandson today.  He has no new acute complaints.  He does report that blood pressure has been low and this has resulted in 2 falls.  We reviewed his most recent discharge summary.  The planned for MRI lumbar spine and then follow-up with Neurosurgery for fracture seen at L[facility].  He plans to follow with nephrologist Dr. Loly Payne. Has been receiving HD through tunneled catheter in right chest on T/T/S. Reviewed lab results from 2/17/24. Reviewed results of recent renal biopsy.     Health Maintenance Topics with due status: Not Due       Topic Last Completion Date    Colorectal Cancer Screening 01/06/2022    PROSTATE-SPECIFIC ANTIGEN 02/27/2023    Hemoglobin A1c (Diabetic Prevention Screening) 02/27/2023    Lipid Panel 08/30/2023    TETANUS VACCINE 09/05/2023      Review of Systems   All other systems reviewed and are negative.     Objective:     Vitals:    02/20/24 0815   BP: (!) 120/56   Pulse: 65        Physical Exam  Vitals reviewed.   Constitutional:       General: He is not in acute distress.     Appearance: Normal appearance. He is well-developed. He is not ill-appearing, toxic-appearing or diaphoretic.          Comments: Ambulating with cane    Tunneled catheter as shown    HENT:      Head: Normocephalic and atraumatic.      Right Ear: External ear normal.      Left Ear: External ear normal.      Nose: Nose normal.   Eyes:      Extraocular Movements: Extraocular movements intact.      Conjunctiva/sclera: Conjunctivae normal.   Cardiovascular:      Rate and Rhythm: Normal rate and regular rhythm.      Pulses: Normal pulses.      Heart sounds: Normal heart sounds. No murmur heard.     No friction rub. No gallop.   Pulmonary:      Effort: Pulmonary effort is normal.  No respiratory distress.      Breath sounds: Normal breath sounds. No stridor. No wheezing, rhonchi or rales.   Musculoskeletal:      Right lower leg: No edema.      Left lower leg: No edema.   Skin:     General: Skin is warm and dry.   Neurological:      General: No focal deficit present.      Mental Status: He is alert and oriented to person, place, and time. Mental status is at baseline.   Psychiatric:         Mood and Affect: Mood normal.         Behavior: Behavior normal.         Thought Content: Thought content normal.         Judgment: Judgment normal.         Assessment:       1. Hospital discharge follow-up    2. ESRD (end stage renal disease) on dialysis Chronic   3. Chronic anemia Chronic   4. Coronary artery disease involving native coronary artery of native heart with angina pectoris with documented spasm Chronic   5. Atherosclerosis of native artery of both lower extremities with intermittent claudication Chronic   6. Membranous nephropathy determined by biopsy Active   7. Hypotension, unspecified hypotension type    8. Closed fracture of fourth lumbar vertebra, unspecified fracture morphology, initial encounter Active       Plan:         Hospital discharge follow-up    ESRD (end stage renal disease) on dialysis  Comments:  continue to follow with nephrology  Orders:  -     Comprehensive Metabolic Panel; Future; Expected date: 02/20/2024    Chronic anemia  Comments:  continue to monitor  Orders:  -     CBC Auto Differential; Future; Expected date: 02/20/2024    Coronary artery disease involving native coronary artery of native heart with angina pectoris with documented spasm  Comments:  Continue current medications.  Continue to follow with Cardiology.    Atherosclerosis of native artery of both lower extremities with intermittent claudication  Comments:  Continue current medications. Continue to follow with Cardiology.    Membranous nephropathy determined by biopsy  Comments:  Continue to follow with  Nephrology    Hypotension, unspecified hypotension type  Comments:  He needs to discuss this with his nephrologist    Closed fracture of fourth lumbar vertebra, unspecified fracture morphology, initial encounter  Comments:  Do MRI and then will see neurosurgery        RTC 1 month     Warning signs discussed, patient to call with any further issues or worsening of symptoms.       Parts of the above note were dictated using a voice dictation software. Please excuse any grammatical or typographical errors.

## 2024-02-21 ENCOUNTER — PATIENT MESSAGE (OUTPATIENT)
Dept: INTERNAL MEDICINE | Facility: CLINIC | Age: 64
End: 2024-02-21
Payer: COMMERCIAL

## 2024-02-23 ENCOUNTER — HOSPITAL ENCOUNTER (EMERGENCY)
Facility: HOSPITAL | Age: 64
Discharge: HOME | End: 2024-02-23
Attending: EMERGENCY MEDICINE
Payer: COMMERCIAL

## 2024-02-23 VITALS
OXYGEN SATURATION: 99 % | DIASTOLIC BLOOD PRESSURE: 73 MMHG | SYSTOLIC BLOOD PRESSURE: 172 MMHG | RESPIRATION RATE: 16 BRPM | WEIGHT: 181 LBS | HEIGHT: 70 IN | BODY MASS INDEX: 25.91 KG/M2 | HEART RATE: 66 BPM | TEMPERATURE: 98 F

## 2024-02-23 DIAGNOSIS — Z78.9 PROBLEM WITH VASCULAR ACCESS: Primary | ICD-10-CM

## 2024-02-23 PROCEDURE — 99281 EMR DPT VST MAYX REQ PHY/QHP: CPT

## 2024-02-23 NOTE — DISCHARGE INSTRUCTIONS
Thank you for letting myself and our team take care for you today! It was nice meeting you, and I hope you feel better very soon. Please come back to [facility] for all of your future medical needs.   Our goal in the ER is to always give you outstanding care and exceptional service. You may receive a survey by mail or email in the next week about your experience in our ED. We would greatly appreciate you completing and returning the survey. Your feedback provides us with a way to recognize our staff who give very good care and it helps us learn how to improve when your experience was below our aspiration of excellence.     Sincerely,     Avel Marrero PA-C  Emergency Room Physician Assistant  [facility]

## 2024-02-23 NOTE — ED PROVIDER NOTES
Encounter Date: 2/23/2024       History     Chief Complaint   Patient presents with    Vascular Access Problem     Right chest placement on 02/08.  Dialysis Tue/Thurs/Sat.  Went yesterday.  Woke up this morning to bandaging soaked in blood.  Blood appears to have come from where catheter meets skin.  Sutures in place.       63-year-old male on dialysis T/TH/SAT presents ED with concern of bleeding from right chest dialysis port.  He states he noticed bleeding this morning.  No pain symptoms.  Last full dialysis session yesterday.  No chest pain or shortness of breath.  No other acute complaints at this time.    The history is provided by the patient.     Review of patient's allergies indicates:   Allergen Reactions    Ace inhibitors Rash     Past Medical History:   Diagnosis Date    Allergy     Anemia     Anticoagulant long-term use     Arthritis     CKD (chronic kidney disease) stage 4, GFR 15-29 ml/min     COPD (chronic obstructive pulmonary disease) 08/20/2021    Coronary artery disease     Heart attack 10/04/2019    Hematuria     Hemothorax     Hyperlipidemia     Hypertension     Hyperuricemia     Hypocalcemia     Hypokalemia     Hypophosphatemia     Hypothyroidism 3/2/2023    PAD (peripheral artery disease)     Proteinuria     Vitamin D deficiency      Past Surgical History:   Procedure Laterality Date    ABDOMINAL AORTOGRAPHY N/A 5/10/2019    Procedure: AORTOGRAM-ABDOMINAL;  Surgeon: Keo Jenkins MD;  Location: [facility] CATH LAB/EP;  Service: Cardiology;  Laterality: N/A;  RSFA intervention     AORTOGRAPHY WITH SERIALOGRAPHY N/A 12/3/2021    Procedure: AORTOGRAM, WITH SERIALOGRAPHY;  Surgeon: Keo Jenkins MD;  Location: [facility] CATH LAB/EP;  Service: Cardiology;  Laterality: N/A;    AORTOGRAPHY WITH SERIALOGRAPHY N/A 4/1/2022    Procedure: AORTOGRAM, WITH SERIALOGRAPHY;  Surgeon: Keo Jenkins MD;  Location: [facility] CATH LAB/EP;  Service: Cardiology;  Laterality: N/A;    ATHERECTOMY, CORONARY N/A 4/25/2023     Procedure: Atherectomy-coronary;  Surgeon: Keo Jenkins MD;  Location: [facility] CATH LAB/EP;  Service: Cardiology;  Laterality: N/A;    BONE MARROW BIOPSY Right 10/12/2021    Procedure: BIOPSY-BONE MARROW;  Surgeon: Naseem Woods MD;  Location: [facility] OR;  Service: Oncology;  Laterality: Right;    CARDIAC CATHETERIZATION      CATARACT EXTRACTION      CATARACT EXTRACTION W/  INTRAOCULAR LENS IMPLANT Right 6/8/2020    Procedure: EXTRACTION, CATARACT, WITH IOL INSERTION;  Surgeon: Tin Light MD;  Location: [facility] OR;  Service: Ophthalmology;  Laterality: Right;    CATARACT EXTRACTION W/  INTRAOCULAR LENS IMPLANT Left 7/2/2020    Procedure: EXTRACTION, CATARACT, WITH IOL INSERTION;  Surgeon: Tin Light MD;  Location: [facility] OR;  Service: Ophthalmology;  Laterality: Left;    COLONOSCOPY N/A 1/6/2022    Procedure: COLONOSCOPY;  Surgeon: Kathe Penaloza MD;  [facility]  Service: Endoscopy;  Laterality: N/A;  COVID test at [facility] on 1/3-GT  okay to hold Plavix for 5 days and aspirin per Dr. Becerra  2nd floor due toextensive cardiac history   instructions mailed and my [facility] portal -     CORONARY ANGIOGRAPHY N/A 3/29/2019    Procedure: ANGIOGRAM, CORONARY ARTERY;  Surgeon: Keo Jenkins MD;  Location: [facility] CATH LAB/EP;  Service: Cardiology;  Laterality: N/A;    CORONARY ANGIOGRAPHY Left 10/11/2019    Procedure: ANGIOGRAM, CORONARY ARTERY;  Surgeon: Keo Jenkins MD;  Location: [facility] CATH LAB/EP;  Service: Cardiology;  Laterality: Left;    CORONARY ANGIOGRAPHY N/A 4/10/2023    Procedure: ANGIOGRAM, CORONARY ARTERY;  Surgeon: Keo Jenkins MD;  Location: [facility] CATH LAB/EP;  Service: Cardiology;  Laterality: N/A;    CORONARY ANGIOPLASTY WITH STENT PLACEMENT  03/29/2019    mid and distal RCA    ESOPHAGOGASTRODUODENOSCOPY N/A 1/6/2022    Procedure: EGD (ESOPHAGOGASTRODUODENOSCOPY);  Surgeon: Kathe Penaloza MD;  Location: [facility] (2ND FLR);  Service: Endoscopy;  Laterality: N/A;    EYE SURGERY       INSERTION OF TUNNELED CENTRAL VENOUS HEMODIALYSIS CATHETER N/A 2/9/2024    Procedure: INSERTION, CATHETER, HEMODIALYSIS, DUAL LUMEN;  Surgeon: Wang Mendieta MD;  Location: [facility] OR;  Service: General;  Laterality: N/A;    INSTANTANEOUS WAVE-FREE RATIO (IFR) N/A 4/25/2023    Procedure: Instantaneous Wave-Free Ratio (IFR);  Surgeon: Keo Jenkins MD;  Location: [facility] CATH LAB/EP;  Service: Cardiology;  Laterality: N/A;    INTRAVASCULAR ULTRASOUND, NON-CORONARY  8/12/2022    Procedure: Intravascular Ultrasound, Non-Coronary;  Surgeon: Keo Jenkins MD;  Location: [facility] CATH LAB/EP;  Service: Cardiology;;    IVUS, CORONARY  4/25/2023    Procedure: IVUS, Coronary;  Surgeon: Keo Jenkins MD;  Location: [facility] CATH LAB/EP;  Service: Cardiology;;    IVUS, CORONARY  5/16/2023    Procedure: IVUS, Coronary;  Surgeon: Keo Jenkins MD;  Location: [facility] CATH LAB/EP;  Service: Cardiology;;  CX    LEFT HEART CATHETERIZATION N/A 3/29/2019    Procedure: Left heart cath;  Surgeon: Keo Jenkins MD;  Location: [facility] CATH LAB/EP;  Service: Cardiology;  Laterality: N/A;    LEFT HEART CATHETERIZATION Left 10/8/2019    Procedure: Left heart cath;  Surgeon: Keo Jenkins MD;  Location: [facility] CATH LAB/EP;  Service: Cardiology;  Laterality: Left;    LEFT HEART CATHETERIZATION Left 4/10/2023    Procedure: Left heart cath;  Surgeon: Keo Jenkins MD;  Location: [facility] CATH LAB/EP;  Service: Cardiology;  Laterality: Left;    LEFT HEART CATHETERIZATION Left 4/25/2023    Procedure: Left heart cath;  Surgeon: Keo Jenkins MD;  Location: [facility] CATH LAB/EP;  Service: Cardiology;  Laterality: Left;    LEFT HEART CATHETERIZATION Left 5/16/2023    Procedure: Left heart cath;  Surgeon: Keo Jenkins MD;  Location: [facility] CATH LAB/EP;  Service: Cardiology;  Laterality: Left;    PERCUTANEOUS TRANSLUMINAL ANGIOPLASTY (PTA) OF PERIPHERAL VESSEL N/A 7/12/2019    Procedure: PTA, PERIPHERAL VESSEL;  Surgeon: Keo Jenkins MD;  Location: [facility]  CATH LAB/EP;  Service: Cardiology;  Laterality: N/A;    PERCUTANEOUS TRANSLUMINAL ANGIOPLASTY (PTA) OF PERIPHERAL VESSEL Left 5/20/2022    Procedure: PTA, PERIPHERAL VESSEL;  Surgeon: Keo Jenkins MD;  Location: [facility] CATH LAB/EP;  Service: Cardiology;  Laterality: Left;    PERCUTANEOUS TRANSLUMINAL ANGIOPLASTY (PTA) OF PERIPHERAL VESSEL Right 8/12/2022    Procedure: PTA, PERIPHERAL VESSEL;  Surgeon: Keo Jenkins MD;  Location: [facility] CATH LAB/EP;  Service: Cardiology;  Laterality: Right;    PERCUTANEOUS TRANSLUMINAL BALLOON ANGIOPLASTY OF CORONARY ARTERY  4/25/2023    Procedure: Angioplasty-coronary;  Surgeon: Keo Jenkins MD;  Location: [facility] CATH LAB/EP;  Service: Cardiology;;    PTCA, SINGLE VESSEL  5/16/2023    Procedure: PTCA, Single Vessel;  Surgeon: Keo Jenkins MD;  Location: [facility] CATH LAB/EP;  Service: Cardiology;;  CX    REMOVAL OF HEMODIALYSIS CATHETER Right 2/9/2024    Procedure: REMOVAL, CATHETER, HEMODIALYSIS;  Surgeon: Wang Mendieta MD;  Location: [facility] OR;  Service: General;  Laterality: Right;    STENT, DRUG ELUTING, SINGLE VESSEL, CORONARY  4/25/2023    Procedure: Stent, Drug Eluting, Single Vessel, Coronary;  Surgeon: Keo Jenkins MD;  Location: [facility] CATH LAB/EP;  Service: Cardiology;;    STENT, DRUG ELUTING, SINGLE VESSEL, CORONARY  5/16/2023    Procedure: Stent, Drug Eluting, Single Vessel, Coronary;  Surgeon: Keo Jenkins MD;  Location: [facility] CATH LAB/EP;  Service: Cardiology;;  CX    TRANSESOPHAGEAL ECHOCARDIOGRAM WITH POSSIBLE CARDIOVERSION (JOSE W/ POSS CARDIOVERSION) N/A 6/19/2023    Procedure: Transesophageal echo (JOSE) intra-procedure log documentation;  Surgeon: Keo Jenkins MD;  Location: [facility] CATH LAB/EP;  Service: Cardiology;  Laterality: N/A;     Family History   Problem Relation Age of Onset    Aneurysm Mother     Hypertension Mother     Heart disease Mother     Cancer Father     Diabetes Sister     Hypertension Sister     No Known Problems Brother     No Known  Problems Son      Social History     Tobacco Use    Smoking status: Former     Current packs/day: 0.00     Types: Cigarettes     Quit date: 1999     Years since quittin.8    Smokeless tobacco: Never   Substance Use Topics    Alcohol use: No     Alcohol/week: 0.0 standard drinks of alcohol    Drug use: No     Review of Systems   Respiratory:  Negative for cough and shortness of breath.    Cardiovascular:  Negative for chest pain.   Skin:  Positive for wound.       Physical Exam     Initial Vitals [24 0740]   BP Pulse Resp Temp SpO2   (!) 172/73 66 16 97.9 °F (36.6 °C) 99 %      MAP       --         Physical Exam    Nursing note and vitals reviewed.  Constitutional: He appears well-developed and well-nourished. He is active. He does not have a sickly appearance. He does not appear ill. No distress.   HENT:   Head: Normocephalic and atraumatic.   Neck:   Normal range of motion.  Cardiovascular:  Normal rate and regular rhythm.           Pulmonary/Chest: Effort normal.     Vascular access port to right chest wall.  No bleeding.  No swelling or surrounding bruising.  No induration.  Nontender.   Musculoskeletal:      Cervical back: Normal range of motion.     Neurological: He is alert. GCS eye subscore is 4. GCS verbal subscore is 5. GCS motor subscore is 6.   Skin: Skin is warm and dry.   Psychiatric: He has a normal mood and affect. His speech is normal and behavior is normal.         ED Course   Procedures  Labs Reviewed - No data to display       Imaging Results    None          Medications - No data to display  Medical Decision Making  Patient presents with concern of localized bleeding to dialysis port on right anterior chest wall that began this morning.  Bleeding has since stopped.  Afebrile.  Patient with no complaints at this time.    DDx:  Including but not limited to wound check    Area was cleaned and does not appear to have any continued bleeding.  No surrounding bruising or induration.  No  tenderness.  Reports site appears to be appropriately fixed.  Patient with no complaints.  Encouraged to continue monitoring area closely and continue to follow-up with dialysis as scheduled.  ED return precautions were discussed.  Patient states his understanding and agrees with plan.    Patient discussed with attending, Dr. Packer, who agrees with ED course and dispo.                                      Clinical Impression:  Final diagnoses:  [Z78.9] Problem with vascular access (Primary)          ED Disposition Condition    Discharge Stable          ED Prescriptions    None       Follow-up Information       Follow up With Specialties Details Why Contact Info    Analy Encarnacion MD Internal Medicine   [address] [facility] [id number] [phone]               Avel Marrero PA-C  02/23/24 [id number]

## 2024-02-23 NOTE — ED NOTES
Tunneled central line placed 02/08.  Patient states doctor unsure how long patient will need dialysis.  Went to dialysis yesterday.      Woke up this morning with bandaging soaked in blood.  Clot noted under bandaging.  Patient reports shirt was soaked in blood as well.      Denies complaints of weakness, fatigue, shortness of breath.  Pink, moist membranes.      Bandaging changed and replaced with dry sterile dressing.

## 2024-03-01 ENCOUNTER — OFFICE VISIT (OUTPATIENT)
Dept: CARDIOLOGY | Facility: CLINIC | Age: 64
End: 2024-03-01
Payer: COMMERCIAL

## 2024-03-01 VITALS
WEIGHT: 184.63 LBS | BODY MASS INDEX: 26.43 KG/M2 | HEART RATE: 61 BPM | DIASTOLIC BLOOD PRESSURE: 56 MMHG | SYSTOLIC BLOOD PRESSURE: 131 MMHG | OXYGEN SATURATION: 98 % | HEIGHT: 70 IN

## 2024-03-01 DIAGNOSIS — R07.9 CHEST PAIN, UNSPECIFIED TYPE: ICD-10-CM

## 2024-03-01 DIAGNOSIS — I10 ESSENTIAL HYPERTENSION: ICD-10-CM

## 2024-03-01 DIAGNOSIS — I10 PRIMARY HYPERTENSION: Primary | ICD-10-CM

## 2024-03-01 DIAGNOSIS — I48.19 PERSISTENT ATRIAL FIBRILLATION: ICD-10-CM

## 2024-03-01 DIAGNOSIS — E78.2 MIXED HYPERLIPIDEMIA: ICD-10-CM

## 2024-03-01 DIAGNOSIS — I25.10 ATHEROSCLEROSIS OF NATIVE CORONARY ARTERY OF NATIVE HEART WITHOUT ANGINA PECTORIS: ICD-10-CM

## 2024-03-01 DIAGNOSIS — I47.0 RE-ENTRY VENTRICULAR ARRHYTHMIA: ICD-10-CM

## 2024-03-01 PROCEDURE — 3078F DIAST BP <80 MM HG: CPT | Mod: CPTII,S$GLB,, | Performed by: STUDENT IN AN ORGANIZED HEALTH CARE EDUCATION/TRAINING PROGRAM

## 2024-03-01 PROCEDURE — 3066F NEPHROPATHY DOC TX: CPT | Mod: CPTII,S$GLB,, | Performed by: STUDENT IN AN ORGANIZED HEALTH CARE EDUCATION/TRAINING PROGRAM

## 2024-03-01 PROCEDURE — 99214 OFFICE O/P EST MOD 30 MIN: CPT | Mod: S$GLB,,, | Performed by: STUDENT IN AN ORGANIZED HEALTH CARE EDUCATION/TRAINING PROGRAM

## 2024-03-01 PROCEDURE — 1111F DSCHRG MED/CURRENT MED MERGE: CPT | Mod: CPTII,S$GLB,, | Performed by: STUDENT IN AN ORGANIZED HEALTH CARE EDUCATION/TRAINING PROGRAM

## 2024-03-01 PROCEDURE — 99999 PR PBB SHADOW E&M-EST. PATIENT-LVL III: CPT | Mod: PBBFAC,,, | Performed by: STUDENT IN AN ORGANIZED HEALTH CARE EDUCATION/TRAINING PROGRAM

## 2024-03-01 PROCEDURE — 3062F POS MACROALBUMINURIA REV: CPT | Mod: CPTII,S$GLB,, | Performed by: STUDENT IN AN ORGANIZED HEALTH CARE EDUCATION/TRAINING PROGRAM

## 2024-03-01 PROCEDURE — 3075F SYST BP GE 130 - 139MM HG: CPT | Mod: CPTII,S$GLB,, | Performed by: STUDENT IN AN ORGANIZED HEALTH CARE EDUCATION/TRAINING PROGRAM

## 2024-03-01 PROCEDURE — 3008F BODY MASS INDEX DOCD: CPT | Mod: CPTII,S$GLB,, | Performed by: STUDENT IN AN ORGANIZED HEALTH CARE EDUCATION/TRAINING PROGRAM

## 2024-03-01 RX ORDER — AMLODIPINE BESYLATE 5 MG/1
5 TABLET ORAL DAILY
Qty: 90 TABLET | Refills: 3 | Status: SHIPPED | OUTPATIENT
Start: 2024-03-01 | End: 2024-06-07 | Stop reason: SDUPTHER

## 2024-03-01 RX ORDER — NITROGLYCERIN 0.4 MG/1
0.4 TABLET SUBLINGUAL EVERY 5 MIN PRN
Qty: 90 TABLET | Refills: 3 | Status: SHIPPED | OUTPATIENT
Start: 2024-03-01

## 2024-03-01 RX ORDER — CARVEDILOL 25 MG/1
25 TABLET ORAL 2 TIMES DAILY WITH MEALS
Qty: 180 TABLET | Refills: 3 | Status: SHIPPED | OUTPATIENT
Start: 2024-03-01 | End: 2024-06-07 | Stop reason: SDUPTHER

## 2024-03-01 RX ORDER — EPLERENONE 50 MG/1
50 TABLET, FILM COATED ORAL DAILY
Qty: 90 TABLET | Refills: 3 | Status: SHIPPED | OUTPATIENT
Start: 2024-03-01

## 2024-03-01 RX ORDER — ROSUVASTATIN CALCIUM 40 MG/1
40 TABLET, COATED ORAL NIGHTLY
Qty: 90 TABLET | Refills: 3 | Status: SHIPPED | OUTPATIENT
Start: 2024-03-01 | End: 2024-05-27 | Stop reason: SDUPTHER

## 2024-03-01 RX ORDER — CLOPIDOGREL BISULFATE 75 MG/1
75 TABLET ORAL DAILY
Qty: 90 TABLET | Refills: 3 | Status: SHIPPED | OUTPATIENT
Start: 2024-03-01 | End: 2024-03-27 | Stop reason: SDUPTHER

## 2024-03-01 RX ORDER — RANOLAZINE 500 MG/1
500 TABLET, EXTENDED RELEASE ORAL 2 TIMES DAILY
Qty: 180 TABLET | Refills: 3 | Status: SHIPPED | OUTPATIENT
Start: 2024-03-01 | End: 2024-03-27 | Stop reason: SDUPTHER

## 2024-03-01 RX ORDER — HYDRALAZINE HYDROCHLORIDE 50 MG/1
50 TABLET, FILM COATED ORAL EVERY 8 HOURS
Qty: 270 TABLET | Refills: 3 | Status: SHIPPED | OUTPATIENT
Start: 2024-03-01 | End: 2024-06-07 | Stop reason: SDUPTHER

## 2024-03-01 RX ORDER — ISOSORBIDE MONONITRATE 30 MG/1
30 TABLET, EXTENDED RELEASE ORAL DAILY
Qty: 90 TABLET | Refills: 3 | Status: SHIPPED | OUTPATIENT
Start: 2024-03-01 | End: 2024-06-07 | Stop reason: SDUPTHER

## 2024-03-01 NOTE — PROGRESS NOTES
Subjective:   @Patient ID:  Domingo Gross is a 63 y.o. male who presents for follow-up of No chief complaint on file.      HPI:   Mr. Gross is a pleasant 62 yo gentleman with hx of HTN, CKD IV, PAD with multiple intervention, CAD (mid LAD/prox RCA PCI 3/2019 and prox LCA in 10/2019 following out of hospital cardiac arrest), cardiac arrest in setting of prox LCX occlusion treated with PCI with normal LV function.      3/2023: Mr. Gross returned for follow-up. Recently spoke with him on the phone regarding confusion on whether he was on eliquis or not. His wife confirmed he was on eliquis and aspirin. He has been having angina. Dr. Jenkins has a stress test ordered. He was initiated on Imdur. His Hgb has been stable (9-10).     5/2023: Underwent LCX PCI with Dr. Jenkins.     6/2023: Underwent JOSE/DCCV with Dr. Jenkins. Initiated on amiodarone.    03/01/24: Mr Gross reports chest pain similar to his previous events where he required stents.         Patient Active Problem List    Diagnosis Date Noted    Membranous nephropathy determined by biopsy 02/20/2024    Closed fracture of transverse process of lumbar vertebra 02/16/2024    ABLA (acute blood loss anemia) 02/16/2024    ESRD (end stage renal disease) 02/16/2024    Fall 02/16/2024    Uremia 02/03/2024    EBV infection 01/31/2024    CMV (cytomegalovirus infection) 01/31/2024    Hyponatremia 01/31/2024    Acute liver failure without hepatic coma 01/27/2024    CAP (community acquired pneumonia) 01/27/2024    Paroxysmal atrial fibrillation 08/24/2023    Hypothyroidism 03/02/2023    Unstable angina 03/02/2023     I have messaged his cardiologist and electrophysiologist about this. Currently awaiting response       Persistent atrial fibrillation 11/12/2022    Anemia due to stage 4 chronic kidney disease 12/15/2021    CKD (chronic kidney disease) stage 4, GFR 15-29 ml/min 08/20/2021    COPD (chronic obstructive pulmonary disease) 08/20/2021    Nuclear sclerosis,  "bilateral 2020    Nephrotic range proteinuria 2020    BRETT (acute kidney injury) 2020    Cardiac arrest 10/04/2019    Bilateral carotid artery stenosis     Coronary artery disease involving native coronary artery of native heart with angina pectoris with documented spasm 2019         3/29/2019    S/p LHC via R radial           LM, LAD, and LCX are patent with luminal irregularities  RCA mid 95% calcified lesion  Normal EF with LVEDP 8 mmHg           PCI of mid LAD with 2.5 x 30 and 2.5 x 15 mm Resolute REZA  PCI of proximal RCA to treat guide dissection with 3.5 x 22 Resolute REZA        10/11/2019 for cardiac arrest        S/p staged LCX PCI                    L CFA access              IVUS guided PCI              3.0 x 15 mm Resolute REZA post dilated with 3.5 NC balloon           Abnormal cardiovascular stress test 2019         Nuclear stress test 2019     + ECG with 2 mm ST depression   No wall motion abnormalities   Test stopped at 6 minutes, 7 METs, 86% predicted heart rate   Stopped because of R leg claudication + ECG changes            Venous insufficiency of both lower extremities 2018    Atherosclerosis of native artery of both lower extremities with intermittent claudication 2018    Hyperlipidemia 2015    DJD (degenerative joint disease), lumbar 2015    DDD (degenerative disc disease) 2015    Claudication in peripheral vascular disease 2014    HTN (hypertension) 2013           Right Arm BP - Sittin/62  Left Arm BP - Sittin/56        LABS  CBC  @LABRCNTIP(WBC:3,RBC:3,HGB:3,HCT:3,PLT:3,MCV:3,MCH:3,MCHC:3)@  BMP  @LABRCNTIP(NA:3,K:3,CO2:3,CL:3,BUN:3,Creatinine:3,GLU:3,GFR:3)@    POCT-Glucose  No results found for: "POCTGLUCOSE"    @LABRCNTIP(Calcium:3,MG:3,PHOS:3)@  LFT  @LABRCNTIP(PROT:3,Albumin:3,,Bilitot:3,AST:3,Alkphos:3,ALT:3)@  GFR "     COAGS  @LABRCNTIP(PT:3,INR:3,APTT:3)@  CE  @LABRCNTIP(troponini:3,cktotal:3,ckmb:3)@  ABGs  @XAPPROFYE97(PH,PCO2,PO2,HCO3,POCSATURATED,BE)@  BNP  @LABRCNTIP(BNP:3)@    LAST HbA1c  Lab Results   Component Value Date    HGBA1C 5.2 02/27/2023       Lipid panel  Lab Results   Component Value Date    CHOL 144 08/30/2023    CHOL 144 02/27/2023    CHOL 116 (L) 09/02/2022     Lab Results   Component Value Date    HDL 53 08/30/2023    HDL 55 02/27/2023    HDL 50 09/02/2022     Lab Results   Component Value Date    LDLCALC 63.8 08/30/2023    LDLCALC 75.8 02/27/2023    LDLCALC 41.6 (L) 09/02/2022     Lab Results   Component Value Date    TRIG 136 08/30/2023    TRIG 66 02/27/2023    TRIG 122 09/02/2022     Lab Results   Component Value Date    CHOLHDL 36.8 08/30/2023    CHOLHDL 38.2 02/27/2023    CHOLHDL 43.1 09/02/2022            Review of Systems   Cardiovascular:  Positive for chest pain.   All other systems reviewed and are negative.      Objective:   General: No acute stress  HENT: EOM intact, PERRL, oropharynx clear, mucous membranes clear and moist   Pulmonary: CTAB  Cardiovascular: regular rhythm, nl S1/S2, no murmurs, rubs or gallops  Abdomen: soft, non-tender, no distended no splenomegaly or hepatomegaly   Skin: warm, dry, no erythema, no rashes    Extremities: periph pulses intact, no cyanosis or edema   Neuro: a/ox4, clear speech, follows commands, no weakness or focal neurologic  deficit   Psych: mood and behavior normal       Assessment:     1. Primary hypertension    2. Persistent atrial fibrillation [I48.19]    3. Atherosclerosis of native coronary artery of native heart without angina pectoris    4. Chest pain, unspecified type    5. Re-entry ventricular arrhythmia    6. Essential hypertension    7. Mixed hyperlipidemia        Plan:     CAD with multiple interventions and know coronary anatomy. Patient complaint of chest pain which is concerning for him given significant anatomy. Will order an echo and  stress test. Continue current regimen.   HTN- continue current medication. Will uptitrate as tolerated.   Afib per EP- continue eliquis    Continue with current medical plan and lifestyle changes.  Return sooner for concerns or questions. If symptoms persist go to the ED  I have reviewed all pertinent data on this patient       I have reviewed the patient's medical history in detail and updated the computerized patient record.    Orders Placed This Encounter   Procedures    NM Myocardial Perfusion Spect Multi Pharmacologic     Standing Status:   Future     Standing Expiration Date:   3/1/2025     Order Specific Question:   May the Radiologist modify the order per protocol to meet the clinical needs of the patient?     Answer:   Yes     Order Specific Question:   Stress Medication to use:     Answer:   Regadenoson     Order Specific Question:   Diabetes?     Answer:   No     Order Specific Question:   Will a Cardiologist read this study?     Answer:   No    Treadmill Stress Test     Standing Status:   Future     Standing Expiration Date:   3/1/2025     Order Specific Question:   Which stress agent will be used?     Answer:   Exercise    Echo     Standing Status:   Future     Standing Expiration Date:   3/1/2025     Order Specific Question:   Release to patient     Answer:   Immediate       Follow up as scheduled. Return sooner for concerns or questions            He expressed verbal understanding and agreed with the plan        Patient's Medications   New Prescriptions    No medications on file   Previous Medications    ALBUTEROL (PROVENTIL/VENTOLIN HFA) 90 MCG/ACTUATION INHALER    INHALE 2 PUFFS INTO THE LUNGS EVERY 6 HOURS AS NEEDED FOR WHEEZING    ALBUTEROL-IPRATROPIUM (DUO-NEB) 2.5 MG-0.5 MG/3 ML NEBULIZER SOLUTION    Take 3 mLs by nebulization every 6 (six) hours as needed for Wheezing or Shortness of Breath (or cough). Rescue    COENZYME Q10 100 MG CAPSULE    Take 100 mg by mouth once daily.     HYDROCODONE-ACETAMINOPHEN (NORCO) 5-325 MG PER TABLET    Take 1 tablet by mouth every 8 (eight) hours as needed for Pain.    HYDROXYZINE PAMOATE (VISTARIL) 25 MG CAP    Take 1 capsule (25 mg total) by mouth nightly as needed (Insomnia/Anxiety).    LEVOTHYROXINE (SYNTHROID) 75 MCG TABLET    Take 1 tablet (75 mcg total) by mouth before breakfast.    LIDOCAINE (LIDODERM) 5 %    Place 1 patch onto the skin daily as needed (back pain). Remove & Discard patch within 12 hours or as directed by MD    SODIUM BICARBONATE 650 MG TABLET    Take 1 tablet (650 mg total) by mouth once daily. for 30 doses   Modified Medications    Modified Medication Previous Medication    AMLODIPINE (NORVASC) 5 MG TABLET amLODIPine (NORVASC) 5 MG tablet       Take 1 tablet (5 mg total) by mouth once daily.    Take 1 tablet (5 mg total) by mouth once daily.    APIXABAN (ELIQUIS) 5 MG TAB apixaban (ELIQUIS) 5 mg Tab       Take 1 tablet (5 mg total) by mouth 2 (two) times daily.    Take 1 tablet (5 mg total) by mouth 2 (two) times daily.    CARVEDILOL (COREG) 25 MG TABLET carvediloL (COREG) 25 MG tablet       Take 1 tablet (25 mg total) by mouth 2 (two) times daily with meals.    Take 1 tablet (25 mg total) by mouth 2 (two) times daily with meals.    CLOPIDOGREL (PLAVIX) 75 MG TABLET clopidogreL (PLAVIX) 75 mg tablet       Take 1 tablet (75 mg total) by mouth once daily.    Take 1 tablet (75 mg total) by mouth once daily.    EPLERENONE (INSPRA) 50 MG TAB eplerenone (INSPRA) 50 MG Tab       Take 1 tablet (50 mg total) by mouth once daily.    Take 1 tablet (50 mg total) by mouth once daily.    HYDRALAZINE (APRESOLINE) 50 MG TABLET hydrALAZINE (APRESOLINE) 50 MG tablet       Take 1 tablet (50 mg total) by mouth every 8 (eight) hours.    Take 1 tablet (50 mg total) by mouth every 8 (eight) hours.    ISOSORBIDE MONONITRATE (IMDUR) 30 MG 24 HR TABLET isosorbide mononitrate (IMDUR) 30 MG 24 hr tablet       Take 1 tablet (30 mg total) by mouth once daily.     Take 1 tablet (30 mg total) by mouth once daily.    NITROGLYCERIN (NITROSTAT) 0.4 MG SL TABLET nitroGLYCERIN (NITROSTAT) 0.4 MG SL tablet       Place 1 tablet (0.4 mg total) under the tongue every 5 (five) minutes as needed for Chest pain.    Place 0.4 mg under the tongue every 5 (five) minutes as needed for Chest pain.    RANOLAZINE (RANEXA) 500 MG TB12 ranolazine (RANEXA) 500 MG Tb12       Take 1 tablet (500 mg total) by mouth 2 (two) times daily.    Take 1 tablet (500 mg total) by mouth 2 (two) times daily.    ROSUVASTATIN (CRESTOR) 40 MG TAB rosuvastatin (CRESTOR) 40 MG Tab       Take 1 tablet (40 mg total) by mouth every evening.    Take 1 tablet (40 mg total) by mouth every evening.   Discontinued Medications    No medications on file        Enoch Salazar MD  Cardiovascular Disease [facility]

## 2024-03-05 ENCOUNTER — OFFICE VISIT (OUTPATIENT)
Dept: INTERNAL MEDICINE | Facility: CLINIC | Age: 64
End: 2024-03-05
Payer: COMMERCIAL

## 2024-03-05 VITALS
DIASTOLIC BLOOD PRESSURE: 62 MMHG | OXYGEN SATURATION: 96 % | HEART RATE: 60 BPM | SYSTOLIC BLOOD PRESSURE: 136 MMHG | HEIGHT: 70 IN | WEIGHT: 176.56 LBS | BODY MASS INDEX: 25.28 KG/M2

## 2024-03-05 DIAGNOSIS — J44.9 CHRONIC OBSTRUCTIVE PULMONARY DISEASE, UNSPECIFIED COPD TYPE: Chronic | ICD-10-CM

## 2024-03-05 DIAGNOSIS — N02.2 MEMBRANOUS NEPHROPATHY DETERMINED BY BIOPSY: ICD-10-CM

## 2024-03-05 DIAGNOSIS — I48.19 PERSISTENT ATRIAL FIBRILLATION: ICD-10-CM

## 2024-03-05 DIAGNOSIS — E78.2 MIXED HYPERLIPIDEMIA: Chronic | ICD-10-CM

## 2024-03-05 DIAGNOSIS — E03.9 HYPOTHYROIDISM, UNSPECIFIED TYPE: ICD-10-CM

## 2024-03-05 DIAGNOSIS — I25.111 CORONARY ARTERY DISEASE INVOLVING NATIVE CORONARY ARTERY OF NATIVE HEART WITH ANGINA PECTORIS WITH DOCUMENTED SPASM: ICD-10-CM

## 2024-03-05 DIAGNOSIS — I10 PRIMARY HYPERTENSION: ICD-10-CM

## 2024-03-05 DIAGNOSIS — N18.6 ESRD (END STAGE RENAL DISEASE): ICD-10-CM

## 2024-03-05 PROCEDURE — 3062F POS MACROALBUMINURIA REV: CPT | Mod: CPTII,S$GLB,, | Performed by: INTERNAL MEDICINE

## 2024-03-05 PROCEDURE — 3075F SYST BP GE 130 - 139MM HG: CPT | Mod: CPTII,S$GLB,, | Performed by: INTERNAL MEDICINE

## 2024-03-05 PROCEDURE — 3066F NEPHROPATHY DOC TX: CPT | Mod: CPTII,S$GLB,, | Performed by: INTERNAL MEDICINE

## 2024-03-05 PROCEDURE — 99999 PR PBB SHADOW E&M-EST. PATIENT-LVL V: CPT | Mod: PBBFAC,,, | Performed by: INTERNAL MEDICINE

## 2024-03-05 PROCEDURE — 3008F BODY MASS INDEX DOCD: CPT | Mod: CPTII,S$GLB,, | Performed by: INTERNAL MEDICINE

## 2024-03-05 PROCEDURE — 3078F DIAST BP <80 MM HG: CPT | Mod: CPTII,S$GLB,, | Performed by: INTERNAL MEDICINE

## 2024-03-05 PROCEDURE — 1159F MED LIST DOCD IN RCRD: CPT | Mod: CPTII,S$GLB,, | Performed by: INTERNAL MEDICINE

## 2024-03-05 PROCEDURE — 1160F RVW MEDS BY RX/DR IN RCRD: CPT | Mod: CPTII,S$GLB,, | Performed by: INTERNAL MEDICINE

## 2024-03-05 PROCEDURE — 99214 OFFICE O/P EST MOD 30 MIN: CPT | Mod: S$GLB,,, | Performed by: INTERNAL MEDICINE

## 2024-03-05 PROCEDURE — 1111F DSCHRG MED/CURRENT MED MERGE: CPT | Mod: CPTII,S$GLB,, | Performed by: INTERNAL MEDICINE

## 2024-03-05 NOTE — PROGRESS NOTES
Patient ID: Domingo Gross is a 63 y.o. male.    Chief Complaint: Annual Exam    MEJIA Chirinos is a 63 y.o. male with  COPD, CAD, PAD, afib, chronic anemia, ESRD (on dialysis Tuesday, Thursday, and Saturday) hypothyroidism  and hx of out of hospital cardiac arrest who presents for annual exam. No new acute complaints. Reviewed lab results from 2/17/24. He denies any symptoms of severe anemia such as SOB or fatigue.     Health Maintenance Topics with due status: Not Due       Topic Last Completion Date    Colorectal Cancer Screening 01/06/2022    Hemoglobin A1c (Diabetic Prevention Screening) 02/27/2023    Lipid Panel 08/30/2023    TETANUS VACCINE 09/05/2023      Review of Systems   All other systems reviewed and are negative.      Objective:     Vitals:    03/05/24 1454   BP: 136/62   Pulse: 60        Physical Exam  Vitals reviewed.   Constitutional:       General: He is not in acute distress.     Appearance: Normal appearance. He is well-developed. He is not ill-appearing, toxic-appearing or diaphoretic.   HENT:      Head: Normocephalic and atraumatic.      Right Ear: External ear normal.      Left Ear: External ear normal.      Nose: Nose normal.   Eyes:      Extraocular Movements: Extraocular movements intact.      Conjunctiva/sclera: Conjunctivae normal.   Cardiovascular:      Rate and Rhythm: Normal rate and regular rhythm.      Pulses: Normal pulses.      Heart sounds: Normal heart sounds. No murmur heard.     No friction rub. No gallop.   Pulmonary:      Effort: Pulmonary effort is normal. No respiratory distress.      Breath sounds: Normal breath sounds. No stridor. No wheezing, rhonchi or rales.   Musculoskeletal:      Right lower leg: No edema.      Left lower leg: No edema.   Skin:     General: Skin is warm and dry.   Neurological:      General: No focal deficit present.      Mental Status: He is alert and oriented to person, place, and time. Mental status is at baseline.   Psychiatric:         Mood and  Affect: Mood normal.         Behavior: Behavior normal.         Thought Content: Thought content normal.         Judgment: Judgment normal.         Assessment:       1. ESRD (end stage renal disease) Chronic   2. Chronic obstructive pulmonary disease, unspecified COPD type Chronic   3. Coronary artery disease involving native coronary artery of native heart with angina pectoris with documented spasm Chronic   4. Primary hypertension Well controlled   5. Mixed hyperlipidemia Well controlled   6. Persistent atrial fibrillation Well controlled   7. Membranous nephropathy determined by biopsy Chronic   8. Hypothyroidism, unspecified type Well controlled       Plan:         ESRD (end stage renal disease)  Comments:  Continue to follow with Nephrology.    Chronic obstructive pulmonary disease, unspecified COPD type  Comments:  Chronic and stable.  Continue current medications.    Coronary artery disease involving native coronary artery of native heart with angina pectoris with documented spasm  Comments:  Continue current medications.  Continue to follow with Cardiology.    Primary hypertension  Comments:  Continue current medications    Mixed hyperlipidemia  Comments:  Continue current medication    Persistent atrial fibrillation  Comments:  Continue current medication. Continue to follow with cardiology    Membranous nephropathy determined by biopsy  Comments:  continue to follow with nephrology    Hypothyroidism, unspecified type  Comments:  continue current medication        RTC 3 months     Warning signs discussed, patient to call with any further issues or worsening of symptoms.       Parts of the above note were dictated using a voice dictation software. Please excuse any grammatical or typographical errors.

## 2024-03-07 NOTE — PLAN OF CARE
Patient transferred to recovery cath lab via stretcher with side rails up x2. AIDET completed to patient.  Pt AAOx3. Pt is stable when connecting to cardiac monitors. VSS.  Left radial band in place with no swelling or bleeding noted.  Left foot site with gauze and tegaderm in place, no swelling or bleeding noted to site.  Pt alert but drowsy.  No distress noted.   done

## 2024-03-13 ENCOUNTER — LAB VISIT (OUTPATIENT)
Dept: LAB | Facility: HOSPITAL | Age: 64
End: 2024-03-13
Attending: INTERNAL MEDICINE
Payer: COMMERCIAL

## 2024-03-13 ENCOUNTER — OFFICE VISIT (OUTPATIENT)
Dept: HEMATOLOGY/ONCOLOGY | Facility: CLINIC | Age: 64
End: 2024-03-13
Payer: COMMERCIAL

## 2024-03-13 VITALS
OXYGEN SATURATION: 99 % | SYSTOLIC BLOOD PRESSURE: 133 MMHG | WEIGHT: 178.38 LBS | BODY MASS INDEX: 25.59 KG/M2 | HEART RATE: 55 BPM | DIASTOLIC BLOOD PRESSURE: 63 MMHG

## 2024-03-13 DIAGNOSIS — N18.4 ANEMIA DUE TO STAGE 4 CHRONIC KIDNEY DISEASE: ICD-10-CM

## 2024-03-13 DIAGNOSIS — D50.9 IRON DEFICIENCY ANEMIA, UNSPECIFIED IRON DEFICIENCY ANEMIA TYPE: ICD-10-CM

## 2024-03-13 DIAGNOSIS — D63.1 ANEMIA DUE TO STAGE 4 CHRONIC KIDNEY DISEASE: ICD-10-CM

## 2024-03-13 DIAGNOSIS — N18.4 ANEMIA DUE TO STAGE 4 CHRONIC KIDNEY DISEASE: Primary | ICD-10-CM

## 2024-03-13 DIAGNOSIS — I25.10 CORONARY ARTERY DISEASE INVOLVING NATIVE CORONARY ARTERY OF NATIVE HEART WITHOUT ANGINA PECTORIS: ICD-10-CM

## 2024-03-13 DIAGNOSIS — N18.4 CKD (CHRONIC KIDNEY DISEASE) STAGE 4, GFR 15-29 ML/MIN: ICD-10-CM

## 2024-03-13 DIAGNOSIS — D63.1 ANEMIA DUE TO STAGE 4 CHRONIC KIDNEY DISEASE: Primary | ICD-10-CM

## 2024-03-13 DIAGNOSIS — I48.0 PAROXYSMAL ATRIAL FIBRILLATION: ICD-10-CM

## 2024-03-13 DIAGNOSIS — I73.9 PAD (PERIPHERAL ARTERY DISEASE): ICD-10-CM

## 2024-03-13 LAB
ANISOCYTOSIS BLD QL SMEAR: SLIGHT
BASOPHILS # BLD AUTO: 0.03 K/UL (ref 0–0.2)
BASOPHILS NFR BLD: 0.7 % (ref 0–1.9)
DACRYOCYTES BLD QL SMEAR: ABNORMAL
DIFFERENTIAL METHOD BLD: ABNORMAL
EOSINOPHIL # BLD AUTO: 0.1 K/UL (ref 0–0.5)
EOSINOPHIL NFR BLD: 2.7 % (ref 0–8)
ERYTHROCYTE [DISTWIDTH] IN BLOOD BY AUTOMATED COUNT: 14.1 % (ref 11.5–14.5)
HCT VFR BLD AUTO: 18.7 % (ref 40–54)
HGB BLD-MCNC: 5.9 G/DL (ref 14–18)
IMM GRANULOCYTES # BLD AUTO: 0.01 K/UL (ref 0–0.04)
IMM GRANULOCYTES NFR BLD AUTO: 0.2 % (ref 0–0.5)
LYMPHOCYTES # BLD AUTO: 1 K/UL (ref 1–4.8)
LYMPHOCYTES NFR BLD: 22.4 % (ref 18–48)
MCH RBC QN AUTO: 29.2 PG (ref 27–31)
MCHC RBC AUTO-ENTMCNC: 31.6 G/DL (ref 32–36)
MCV RBC AUTO: 93 FL (ref 82–98)
MONOCYTES # BLD AUTO: 0.7 K/UL (ref 0.3–1)
MONOCYTES NFR BLD: 16.6 % (ref 4–15)
NEUTROPHILS # BLD AUTO: 2.5 K/UL (ref 1.8–7.7)
NEUTROPHILS NFR BLD: 57.4 % (ref 38–73)
NRBC BLD-RTO: 0 /100 WBC
PLATELET # BLD AUTO: 174 K/UL (ref 150–450)
PMV BLD AUTO: 11.2 FL (ref 9.2–12.9)
POIKILOCYTOSIS BLD QL SMEAR: SLIGHT
RBC # BLD AUTO: 2.02 M/UL (ref 4.6–6.2)
SPHEROCYTES BLD QL SMEAR: ABNORMAL
WBC # BLD AUTO: 4.41 K/UL (ref 3.9–12.7)

## 2024-03-13 PROCEDURE — 3008F BODY MASS INDEX DOCD: CPT | Mod: CPTII,S$GLB,, | Performed by: INTERNAL MEDICINE

## 2024-03-13 PROCEDURE — 3075F SYST BP GE 130 - 139MM HG: CPT | Mod: CPTII,S$GLB,, | Performed by: INTERNAL MEDICINE

## 2024-03-13 PROCEDURE — 85025 COMPLETE CBC W/AUTO DIFF WBC: CPT | Performed by: INTERNAL MEDICINE

## 2024-03-13 PROCEDURE — 99999 PR PBB SHADOW E&M-EST. PATIENT-LVL III: CPT | Mod: PBBFAC,,, | Performed by: INTERNAL MEDICINE

## 2024-03-13 PROCEDURE — 3066F NEPHROPATHY DOC TX: CPT | Mod: CPTII,S$GLB,, | Performed by: INTERNAL MEDICINE

## 2024-03-13 PROCEDURE — 1159F MED LIST DOCD IN RCRD: CPT | Mod: CPTII,S$GLB,, | Performed by: INTERNAL MEDICINE

## 2024-03-13 PROCEDURE — 3078F DIAST BP <80 MM HG: CPT | Mod: CPTII,S$GLB,, | Performed by: INTERNAL MEDICINE

## 2024-03-13 PROCEDURE — 99214 OFFICE O/P EST MOD 30 MIN: CPT | Mod: S$GLB,,, | Performed by: INTERNAL MEDICINE

## 2024-03-13 PROCEDURE — 36415 COLL VENOUS BLD VENIPUNCTURE: CPT | Mod: PO | Performed by: INTERNAL MEDICINE

## 2024-03-13 PROCEDURE — 3062F POS MACROALBUMINURIA REV: CPT | Mod: CPTII,S$GLB,, | Performed by: INTERNAL MEDICINE

## 2024-03-13 NOTE — PROGRESS NOTES
PATIENT: Domingo Gross  MRN: [id number]  DATE: 3/13/2024      Subjective:     Chief complaint:  Chief Complaint   Patient presents with    Anemia    Follow-up       Interval History: Mr. Gross returns for follow up on Anemia. At our last visit we had discussed starting him back on retacrit to help keep his hemoglobin up. However it seems that for whatever reason that never got scheduled to be done so he hasn't gotten any retacrit. His hemoglobin currently is 7 so certainly he would qualify for retacrit. He does report occasional SHIRLEY. Still working however, generally otherwise in [facility]. He has started HD on TThSa      Review of Systems   A comprehensive review of systems was performed; pertinent positives and negatives are noted in the HPI.      Objective:      Vitals:   Vitals:    03/13/24 0938   BP: 133/63   BP Location: Right arm   Patient Position: Sitting   BP Method: Medium (Automatic)   Pulse: (!) 55   SpO2: 99%   Weight: 80.9 kg (178 lb 5.6 oz)     BMI: Body mass index is 25.59 kg/m².      Physical Exam:   Physical Exam  Vitals and nursing note reviewed.   Constitutional:       General: He is not in acute distress.     Appearance: Normal appearance. He is normal weight. He is not toxic-appearing.   HENT:      Head: Normocephalic and atraumatic.   Eyes:      General: No scleral icterus.     Conjunctiva/sclera: Conjunctivae normal.   Cardiovascular:      Rate and Rhythm: Normal rate.   Pulmonary:      Effort: Pulmonary effort is normal. No respiratory distress.   Musculoskeletal:         General: No deformity.      Cervical back: Neck supple.   Lymphadenopathy:      Cervical: No cervical adenopathy.   Skin:     Coloration: Skin is not jaundiced.      Findings: No erythema.   Neurological:      General: No focal deficit present.      Mental Status: He is alert and oriented to person, place, and time. Mental status is at baseline.   Psychiatric:         Mood and Affect: Mood normal.         Behavior: Behavior  normal.         Thought Content: Thought content normal.           Laboratory Data:  WBC   Date Value Ref Range Status   02/17/2024 7.06 3.90 - 12.70 K/uL Final   02/17/2024 7.06 3.90 - 12.70 K/uL Final     Hemoglobin   Date Value Ref Range Status   02/17/2024 7.0 (L) 14.0 - 18.0 g/dL Final   02/17/2024 7.0 (L) 14.0 - 18.0 g/dL Final     Hematocrit   Date Value Ref Range Status   02/17/2024 21.1 (L) 40.0 - 54.0 % Final   02/17/2024 21.1 (L) 40.0 - 54.0 % Final     Platelets   Date Value Ref Range Status   02/17/2024 201 150 - 450 K/uL Final   02/17/2024 201 150 - 450 K/uL Final     Gran # (ANC)   Date Value Ref Range Status   02/17/2024 4.5 1.8 - 7.7 K/uL Final   02/17/2024 4.5 1.8 - 7.7 K/uL Final     Gran %   Date Value Ref Range Status   02/17/2024 63.5 38.0 - 73.0 % Final   02/17/2024 63.5 38.0 - 73.0 % Final       Chemistry        Component Value Date/Time     (L) 02/17/2024 0249     (L) 02/17/2024 0249    K 3.8 02/17/2024 0249    K 3.8 02/17/2024 0249    CL 99 02/17/2024 0249    CL 99 02/17/2024 0249    CO2 23 02/17/2024 0249    CO2 23 02/17/2024 0249    BUN 24 (H) 02/17/2024 0249    BUN 24 (H) 02/17/2024 0249    CREATININE 4.6 (H) 02/17/2024 0249    CREATININE 4.6 (H) 02/17/2024 0249    GLU 87 02/17/2024 0249    GLU 87 02/17/2024 0249        Component Value Date/Time    CALCIUM 7.7 (L) 02/17/2024 0249    CALCIUM 7.7 (L) 02/17/2024 0249    ALKPHOS 62 02/15/2024 1106    AST 78 (H) 02/15/2024 1106    ALT 32 02/15/2024 1106    BILITOT 0.5 02/15/2024 1106    ESTGFRAFRICA 28 (A) 07/25/2022 0842    EGFRNONAA 24 (A) 07/25/2022 0842              Assessment/Plan:     1. Anemia due to stage 4 chronic kidney disease    2. Iron deficiency anemia, unspecified iron deficiency anemia type    3. PAD (peripheral artery disease)    4. Coronary artery disease involving native coronary artery of native heart without angina pectoris    5. Paroxysmal atrial fibrillation      Anemia has worsened since last appointment.  At the last visit we had discussed setting him back up with retacrit shots however it seems that never got scheduled. I think retacrit is indicated now even moreso than before so I have put in a new treatment plan for retacrit. Will need to check iron levels to make sure he doesn't have concurrent iron deficiency which would need treatment also.     Med and Orders:  Orders Placed This Encounter    FERRITIN    STFR    Iron and TIBC    epoetin robi-epbx (RETACRIT) 40,000 unit/mL injection       Follow Up:  Follow up in about 4 weeks (around 4/10/2024).      Above care plan was discussed with patient and all questions were addressed to their expressed satisfaction.       Robby Reddy MD, FACP  Hematology & Medical Oncology  [facility]

## 2024-03-14 ENCOUNTER — TELEPHONE (OUTPATIENT)
Dept: FAMILY MEDICINE | Facility: HOSPITAL | Age: 64
End: 2024-03-14
Payer: COMMERCIAL

## 2024-03-14 ENCOUNTER — TELEPHONE (OUTPATIENT)
Dept: INFUSION THERAPY | Facility: HOSPITAL | Age: 64
End: 2024-03-14
Payer: COMMERCIAL

## 2024-03-14 ENCOUNTER — LAB VISIT (OUTPATIENT)
Dept: LAB | Facility: HOSPITAL | Age: 64
End: 2024-03-14
Attending: INTERNAL MEDICINE
Payer: COMMERCIAL

## 2024-03-14 ENCOUNTER — TELEPHONE (OUTPATIENT)
Dept: HEMATOLOGY/ONCOLOGY | Facility: CLINIC | Age: 64
End: 2024-03-14
Payer: COMMERCIAL

## 2024-03-14 DIAGNOSIS — N18.4 ANEMIA DUE TO STAGE 4 CHRONIC KIDNEY DISEASE: ICD-10-CM

## 2024-03-14 DIAGNOSIS — R80.1 PERSISTENT PROTEINURIA: ICD-10-CM

## 2024-03-14 DIAGNOSIS — N06.20 ISOLATED PROTEINURIA WITH DIFFUSE MEMBRANOUS GLOMERULONEPHRITIS, UNSPECIFIED TYPE: ICD-10-CM

## 2024-03-14 DIAGNOSIS — Z01.810 PRE-OPERATIVE CARDIOVASCULAR EXAMINATION: ICD-10-CM

## 2024-03-14 DIAGNOSIS — N18.4 CKD (CHRONIC KIDNEY DISEASE), STAGE IV: ICD-10-CM

## 2024-03-14 DIAGNOSIS — D63.1 ANEMIA DUE TO STAGE 4 CHRONIC KIDNEY DISEASE: ICD-10-CM

## 2024-03-14 DIAGNOSIS — N18.4 ANEMIA DUE TO STAGE 4 CHRONIC KIDNEY DISEASE: Primary | ICD-10-CM

## 2024-03-14 DIAGNOSIS — D63.1 ANEMIA DUE TO STAGE 4 CHRONIC KIDNEY DISEASE: Primary | ICD-10-CM

## 2024-03-14 DIAGNOSIS — N25.81 SECONDARY HYPERPARATHYROIDISM OF RENAL ORIGIN: ICD-10-CM

## 2024-03-14 DIAGNOSIS — E55.9 VITAMIN D DEFICIENCY: ICD-10-CM

## 2024-03-14 DIAGNOSIS — D64.9 SYMPTOMATIC ANEMIA: Primary | ICD-10-CM

## 2024-03-14 LAB
25(OH)D3+25(OH)D2 SERPL-MCNC: 9 NG/ML (ref 30–96)
ALBUMIN SERPL BCP-MCNC: 2.8 G/DL (ref 3.5–5.2)
ANION GAP SERPL CALC-SCNC: 12 MMOL/L (ref 8–16)
BASOPHILS # BLD AUTO: 0.03 K/UL (ref 0–0.2)
BASOPHILS NFR BLD: 0.7 % (ref 0–1.9)
BUN SERPL-MCNC: 14 MG/DL (ref 8–23)
C3 SERPL-MCNC: 93 MG/DL (ref 50–180)
C4 SERPL-MCNC: 13 MG/DL (ref 11–44)
CALCIUM SERPL-MCNC: 8.5 MG/DL (ref 8.7–10.5)
CHLORIDE SERPL-SCNC: 99 MMOL/L (ref 95–110)
CO2 SERPL-SCNC: 28 MMOL/L (ref 23–29)
CREAT SERPL-MCNC: 2.2 MG/DL (ref 0.5–1.4)
DIFFERENTIAL METHOD BLD: ABNORMAL
EOSINOPHIL # BLD AUTO: 0.1 K/UL (ref 0–0.5)
EOSINOPHIL NFR BLD: 3.5 % (ref 0–8)
ERYTHROCYTE [DISTWIDTH] IN BLOOD BY AUTOMATED COUNT: 14.1 % (ref 11.5–14.5)
EST. GFR  (NO RACE VARIABLE): 33 ML/MIN/1.73 M^2
FERRITIN SERPL-MCNC: 674 NG/ML (ref 20–300)
GLUCOSE SERPL-MCNC: 83 MG/DL (ref 70–110)
HCT VFR BLD AUTO: 19 % (ref 40–54)
HGB BLD-MCNC: 6.4 G/DL (ref 14–18)
IMM GRANULOCYTES # BLD AUTO: 0.01 K/UL (ref 0–0.04)
IMM GRANULOCYTES NFR BLD AUTO: 0.2 % (ref 0–0.5)
IRON SERPL-MCNC: 59 UG/DL (ref 45–160)
LYMPHOCYTES # BLD AUTO: 1 K/UL (ref 1–4.8)
LYMPHOCYTES NFR BLD: 24.9 % (ref 18–48)
MAGNESIUM SERPL-MCNC: 1.6 MG/DL (ref 1.6–2.6)
MCH RBC QN AUTO: 29.5 PG (ref 27–31)
MCHC RBC AUTO-ENTMCNC: 33.7 G/DL (ref 32–36)
MCV RBC AUTO: 88 FL (ref 82–98)
MONOCYTES # BLD AUTO: 0.6 K/UL (ref 0.3–1)
MONOCYTES NFR BLD: 14.3 % (ref 4–15)
NEUTROPHILS # BLD AUTO: 2.3 K/UL (ref 1.8–7.7)
NEUTROPHILS NFR BLD: 56.4 % (ref 38–73)
NRBC BLD-RTO: 0 /100 WBC
PHOSPHATE SERPL-MCNC: 2.5 MG/DL (ref 2.7–4.5)
PLATELET # BLD AUTO: 178 K/UL (ref 150–450)
PMV BLD AUTO: 10.6 FL (ref 9.2–12.9)
POTASSIUM SERPL-SCNC: 2.9 MMOL/L (ref 3.5–5.1)
PTH-INTACT SERPL-MCNC: 350.1 PG/ML (ref 9–77)
RBC # BLD AUTO: 2.17 M/UL (ref 4.6–6.2)
SATURATED IRON: 15 % (ref 20–50)
SODIUM SERPL-SCNC: 139 MMOL/L (ref 136–145)
TOTAL IRON BINDING CAPACITY: 386 UG/DL (ref 250–450)
TRANSFERRIN SERPL-MCNC: 261 MG/DL (ref 200–375)
WBC # BLD AUTO: 4.05 K/UL (ref 3.9–12.7)

## 2024-03-14 PROCEDURE — 86039 ANTINUCLEAR ANTIBODIES (ANA): CPT | Performed by: INTERNAL MEDICINE

## 2024-03-14 PROCEDURE — 84165 PROTEIN E-PHORESIS SERUM: CPT | Performed by: INTERNAL MEDICINE

## 2024-03-14 PROCEDURE — 83540 ASSAY OF IRON: CPT | Performed by: INTERNAL MEDICINE

## 2024-03-14 PROCEDURE — 86038 ANTINUCLEAR ANTIBODIES: CPT | Performed by: INTERNAL MEDICINE

## 2024-03-14 PROCEDURE — 84165 PROTEIN E-PHORESIS SERUM: CPT | Mod: 26,,, | Performed by: PATHOLOGY

## 2024-03-14 PROCEDURE — 82040 ASSAY OF SERUM ALBUMIN: CPT | Performed by: INTERNAL MEDICINE

## 2024-03-14 PROCEDURE — 86160 COMPLEMENT ANTIGEN: CPT | Mod: 59 | Performed by: INTERNAL MEDICINE

## 2024-03-14 PROCEDURE — 82306 VITAMIN D 25 HYDROXY: CPT | Performed by: INTERNAL MEDICINE

## 2024-03-14 PROCEDURE — 85025 COMPLETE CBC W/AUTO DIFF WBC: CPT | Performed by: INTERNAL MEDICINE

## 2024-03-14 PROCEDURE — 80048 BASIC METABOLIC PNL TOTAL CA: CPT | Performed by: INTERNAL MEDICINE

## 2024-03-14 PROCEDURE — 84238 ASSAY NONENDOCRINE RECEPTOR: CPT | Performed by: INTERNAL MEDICINE

## 2024-03-14 PROCEDURE — 86160 COMPLEMENT ANTIGEN: CPT | Performed by: INTERNAL MEDICINE

## 2024-03-14 PROCEDURE — 82728 ASSAY OF FERRITIN: CPT | Performed by: INTERNAL MEDICINE

## 2024-03-14 PROCEDURE — 84100 ASSAY OF PHOSPHORUS: CPT | Performed by: INTERNAL MEDICINE

## 2024-03-14 PROCEDURE — 83735 ASSAY OF MAGNESIUM: CPT | Performed by: INTERNAL MEDICINE

## 2024-03-14 PROCEDURE — 86592 SYPHILIS TEST NON-TREP QUAL: CPT | Performed by: INTERNAL MEDICINE

## 2024-03-14 PROCEDURE — 86780 TREPONEMA PALLIDUM: CPT | Performed by: INTERNAL MEDICINE

## 2024-03-14 PROCEDURE — 86036 ANCA SCREEN EACH ANTIBODY: CPT | Performed by: INTERNAL MEDICINE

## 2024-03-14 PROCEDURE — 83970 ASSAY OF PARATHORMONE: CPT | Performed by: INTERNAL MEDICINE

## 2024-03-14 PROCEDURE — 86235 NUCLEAR ANTIGEN ANTIBODY: CPT | Mod: 59 | Performed by: INTERNAL MEDICINE

## 2024-03-14 PROCEDURE — 36415 COLL VENOUS BLD VENIPUNCTURE: CPT | Performed by: INTERNAL MEDICINE

## 2024-03-14 RX ORDER — HYDROCODONE BITARTRATE AND ACETAMINOPHEN 500; 5 MG/1; MG/1
TABLET ORAL ONCE
Status: CANCELLED | OUTPATIENT
Start: 2024-03-14 | End: 2024-03-14

## 2024-03-14 NOTE — TELEPHONE ENCOUNTER
----- Message from Courtney Dash sent at 3/13/2024  4:51 PM CDT -----  Regarding: Critical      Name Of Caller:    Ivett alcala/ Kalyani Pearson      Contact Preference:    [phone]      Nature of call:    Lab has a critical on the pt. Please call back.

## 2024-03-14 NOTE — TELEPHONE ENCOUNTER
Spoke with patient let him know that his labs came back abnormal and he needs to have a blood transfusion. I told him that they have him scheduled tomorrow for 8:30am but he will need to go to the [place] lab today to get the type and screen and will need to keep the wristband on that they give him. He said he's at dialysis this morning but will go after he's finished there.

## 2024-03-14 NOTE — TELEPHONE ENCOUNTER
LVM for pt.. if possible to go get labs redrawn this even, if not possible to then go for 7am, to allow blood blank time to process and prepare.

## 2024-03-14 NOTE — TELEPHONE ENCOUNTER
Alisson from [facility] called with a critical lab result of Hemoglobin-6.4 and Hematocrit-19. Dr. Jenkins was the ordering provider but Dr. Jenkins is no longer at [facility]. High Priority message sent to Dr. Robby Reddy as he was the last person to see this patient for his anemia. Value was entered into Flowsheets.

## 2024-03-15 ENCOUNTER — INFUSION (OUTPATIENT)
Dept: INFUSION THERAPY | Facility: HOSPITAL | Age: 64
End: 2024-03-15
Attending: INTERNAL MEDICINE
Payer: COMMERCIAL

## 2024-03-15 ENCOUNTER — TELEPHONE (OUTPATIENT)
Dept: TRANSPLANT | Facility: CLINIC | Age: 64
End: 2024-03-15
Payer: COMMERCIAL

## 2024-03-15 ENCOUNTER — LAB VISIT (OUTPATIENT)
Dept: LAB | Facility: HOSPITAL | Age: 64
End: 2024-03-15
Attending: INTERNAL MEDICINE
Payer: COMMERCIAL

## 2024-03-15 VITALS
BODY MASS INDEX: 25.54 KG/M2 | TEMPERATURE: 98 F | DIASTOLIC BLOOD PRESSURE: 63 MMHG | HEIGHT: 70 IN | OXYGEN SATURATION: 99 % | RESPIRATION RATE: 18 BRPM | SYSTOLIC BLOOD PRESSURE: 138 MMHG | HEART RATE: 53 BPM | WEIGHT: 178.38 LBS

## 2024-03-15 DIAGNOSIS — D64.9 SYMPTOMATIC ANEMIA: ICD-10-CM

## 2024-03-15 DIAGNOSIS — D63.1 ANEMIA DUE TO STAGE 4 CHRONIC KIDNEY DISEASE: ICD-10-CM

## 2024-03-15 DIAGNOSIS — N18.4 ANEMIA DUE TO STAGE 4 CHRONIC KIDNEY DISEASE: ICD-10-CM

## 2024-03-15 DIAGNOSIS — N18.4 CKD (CHRONIC KIDNEY DISEASE), STAGE IV: ICD-10-CM

## 2024-03-15 LAB
ABO + RH BLD: NORMAL
ALBUMIN SERPL ELPH-MCNC: 3.07 G/DL (ref 3.35–5.55)
ALBUMIN/CREAT UR: 829.1 UG/MG (ref 0–30)
ALPHA1 GLOB SERPL ELPH-MCNC: 0.41 G/DL (ref 0.17–0.41)
ALPHA2 GLOB SERPL ELPH-MCNC: 0.59 G/DL (ref 0.43–0.99)
ANA PATTERN 1: NORMAL
ANA SER QL IF: POSITIVE
ANA TITR SER IF: NORMAL {TITER}
B-GLOBULIN SERPL ELPH-MCNC: 0.93 G/DL (ref 0.5–1.1)
BACTERIA #/AREA URNS HPF: ABNORMAL /HPF
BILIRUB UR QL STRIP: NEGATIVE
BLD GP AB SCN CELLS X3 SERPL QL: NORMAL
BLD PROD TYP BPU: NORMAL
BLD PROD TYP BPU: NORMAL
BLOOD UNIT EXPIRATION DATE: NORMAL
BLOOD UNIT EXPIRATION DATE: NORMAL
BLOOD UNIT TYPE CODE: 600
BLOOD UNIT TYPE CODE: 600
BLOOD UNIT TYPE: NORMAL
BLOOD UNIT TYPE: NORMAL
CLARITY UR: ABNORMAL
CODING SYSTEM: NORMAL
CODING SYSTEM: NORMAL
COLOR UR: YELLOW
CREAT UR-MCNC: 127 MG/DL (ref 23–375)
CREAT UR-MCNC: 131.8 MG/DL (ref 23–375)
CROSSMATCH INTERPRETATION: NORMAL
CROSSMATCH INTERPRETATION: NORMAL
DISPENSE STATUS: NORMAL
DISPENSE STATUS: NORMAL
GAMMA GLOB SERPL ELPH-MCNC: 1.3 G/DL (ref 0.67–1.58)
GLUCOSE UR QL STRIP: NEGATIVE
HGB UR QL STRIP: ABNORMAL
HYALINE CASTS #/AREA URNS LPF: 0 /LPF
KETONES UR QL STRIP: NEGATIVE
LEUKOCYTE ESTERASE UR QL STRIP: NEGATIVE
MICROALBUMIN UR DL<=1MG/L-MCNC: 1053 UG/ML
MICROSCOPIC COMMENT: ABNORMAL
NITRITE UR QL STRIP: NEGATIVE
NUM UNITS TRANS PACKED RBC: NORMAL
NUM UNITS TRANS PACKED RBC: NORMAL
PATHOLOGIST INTERPRETATION SPE: NORMAL
PH UR STRIP: 7 [PH] (ref 5–8)
PROT SERPL-MCNC: 6.3 G/DL (ref 6–8.4)
PROT UR QL STRIP: ABNORMAL
PROT UR-MCNC: 200 MG/DL (ref 0–15)
PROT/CREAT UR: 1.52 MG/G{CREAT} (ref 0–0.2)
RBC #/AREA URNS HPF: 100 /HPF (ref 0–4)
RPR SER QL: NORMAL
SP GR UR STRIP: 1.01 (ref 1–1.03)
SPECIMEN OUTDATE: NORMAL
T PALLIDUM AB SER QL IF: REACTIVE
URN SPEC COLLECT METH UR: ABNORMAL
UROBILINOGEN UR STRIP-ACNC: NEGATIVE EU/DL
WBC #/AREA URNS HPF: 3 /HPF (ref 0–5)

## 2024-03-15 PROCEDURE — P9016 RBC LEUKOCYTES REDUCED: HCPCS | Performed by: INTERNAL MEDICINE

## 2024-03-15 PROCEDURE — 81000 URINALYSIS NONAUTO W/SCOPE: CPT | Performed by: INTERNAL MEDICINE

## 2024-03-15 PROCEDURE — 36430 TRANSFUSION BLD/BLD COMPNT: CPT

## 2024-03-15 PROCEDURE — 82570 ASSAY OF URINE CREATININE: CPT | Mod: 91 | Performed by: INTERNAL MEDICINE

## 2024-03-15 PROCEDURE — 86850 RBC ANTIBODY SCREEN: CPT | Performed by: INTERNAL MEDICINE

## 2024-03-15 PROCEDURE — 36415 COLL VENOUS BLD VENIPUNCTURE: CPT | Performed by: INTERNAL MEDICINE

## 2024-03-15 PROCEDURE — 82043 UR ALBUMIN QUANTITATIVE: CPT | Performed by: INTERNAL MEDICINE

## 2024-03-15 PROCEDURE — 25000003 PHARM REV CODE 250: Performed by: INTERNAL MEDICINE

## 2024-03-15 PROCEDURE — 86335 IMMUNFIX E-PHORSIS/URINE/CSF: CPT | Mod: 26,,, | Performed by: PATHOLOGY

## 2024-03-15 PROCEDURE — 86335 IMMUNFIX E-PHORSIS/URINE/CSF: CPT | Performed by: INTERNAL MEDICINE

## 2024-03-15 PROCEDURE — 86920 COMPATIBILITY TEST SPIN: CPT | Performed by: INTERNAL MEDICINE

## 2024-03-15 RX ORDER — HYDROCODONE BITARTRATE AND ACETAMINOPHEN 500; 5 MG/1; MG/1
TABLET ORAL ONCE
Status: COMPLETED | OUTPATIENT
Start: 2024-03-15 | End: 2024-03-15

## 2024-03-15 RX ADMIN — SODIUM CHLORIDE: 9 INJECTION, SOLUTION INTRAVENOUS at 09:03

## 2024-03-15 NOTE — PLAN OF CARE
Pt received 1 unit PRBC through PIV without difficulties. Blood consent in chart. S/S of transfusion reaction discussed with patient. Pt verbalized understanding.  Vital signs remained stable throughout. Pt tolerated it well. AVS given. Pt will follow up with MD. Discharged with no acute distress.

## 2024-03-15 NOTE — NURSING
"RN at chairside, to start second unit of PRBC.. Pt questioning, "how long will that take, because I have to leave for 12:30 to go  my grandchild." Dr. Reddy notified of time constrain and that pt has only received 1 of the 2 units of PRBC.     Pt tolerated 1 unit of PRBC infusion well.  No adverse reaction noted.   IV flushed with NS and D/C per protocol. Patient left clinic in no acute distress.   "

## 2024-03-16 LAB — STFR SERPL-MCNC: 2.3 MG/L (ref 1.8–4.6)

## 2024-03-18 ENCOUNTER — HOSPITAL ENCOUNTER (OUTPATIENT)
Dept: RADIOLOGY | Facility: HOSPITAL | Age: 64
Discharge: HOME | End: 2024-03-18
Attending: STUDENT IN AN ORGANIZED HEALTH CARE EDUCATION/TRAINING PROGRAM
Payer: COMMERCIAL

## 2024-03-18 ENCOUNTER — HOSPITAL ENCOUNTER (OUTPATIENT)
Dept: CARDIOLOGY | Facility: HOSPITAL | Age: 64
Discharge: HOME | End: 2024-03-18
Attending: STUDENT IN AN ORGANIZED HEALTH CARE EDUCATION/TRAINING PROGRAM
Payer: COMMERCIAL

## 2024-03-18 VITALS — WEIGHT: 178 LBS | HEIGHT: 70 IN | BODY MASS INDEX: 25.48 KG/M2

## 2024-03-18 DIAGNOSIS — I47.0 RE-ENTRY VENTRICULAR ARRHYTHMIA: ICD-10-CM

## 2024-03-18 DIAGNOSIS — R07.9 CHEST PAIN, UNSPECIFIED TYPE: ICD-10-CM

## 2024-03-18 DIAGNOSIS — I25.10 ATHEROSCLEROSIS OF NATIVE CORONARY ARTERY OF NATIVE HEART WITHOUT ANGINA PECTORIS: ICD-10-CM

## 2024-03-18 LAB
ANCA AB TITR SER IF: NORMAL TITER
ANTI SM ANTIBODY: 0.1 RATIO (ref 0–0.99)
ANTI SM/RNP ANTIBODY: 0.1 RATIO (ref 0–0.99)
ANTI-SM INTERPRETATION: NEGATIVE
ANTI-SM/RNP INTERPRETATION: NEGATIVE
ANTI-SSA ANTIBODY: 0.06 RATIO (ref 0–0.99)
ANTI-SSA INTERPRETATION: NEGATIVE
ANTI-SSB ANTIBODY: 0.11 RATIO (ref 0–0.99)
ANTI-SSB INTERPRETATION: NEGATIVE
ASCENDING AORTA: 3.67 CM
AV INDEX (PROSTH): 0.76
AV MEAN GRADIENT: 4 MMHG
AV PEAK GRADIENT: 7 MMHG
AV VALVE AREA BY VELOCITY RATIO: 2.69 CM²
AV VALVE AREA: 2.88 CM²
AV VELOCITY RATIO: 0.71
BSA FOR ECHO PROCEDURE: 2 M2
CV ECHO LV RWT: 0.42 CM
CV STRESS BASE HR: 62 BPM
DIASTOLIC BLOOD PRESSURE: 56 MMHG
DOP CALC AO PEAK VEL: 1.34 M/S
DOP CALC AO VTI: 35.1 CM
DOP CALC LVOT AREA: 3.8 CM2
DOP CALC LVOT DIAMETER: 2.2 CM
DOP CALC LVOT PEAK VEL: 0.95 M/S
DOP CALC LVOT STROKE VOLUME: 101.06 CM3
DOP CALC MV VTI: 34.5 CM
DOP CALCLVOT PEAK VEL VTI: 26.6 CM
DSDNA AB SER-ACNC: NORMAL [IU]/ML
E WAVE DECELERATION TIME: 192.38 MSEC
E/A RATIO: 2.54
E/E' RATIO: 18.77 M/S
ECHO LV POSTERIOR WALL: 1.26 CM (ref 0.6–1.1)
FRACTIONAL SHORTENING: 29 % (ref 28–44)
INTERPRETATION UR IFE-IMP: NORMAL
INTERVENTRICULAR SEPTUM: 1.15 CM (ref 0.6–1.1)
IVC DIAMETER: 1.71 CM
LA MAJOR: 5.1 CM
LA MINOR: 6.01 CM
LA WIDTH: 4.1 CM
LEFT ATRIUM SIZE: 3.82 CM
LEFT ATRIUM VOLUME INDEX MOD: 27 ML/M2
LEFT ATRIUM VOLUME INDEX: 36.9 ML/M2
LEFT ATRIUM VOLUME MOD: 53.79 CM3
LEFT ATRIUM VOLUME: 73.46 CM3
LEFT INTERNAL DIMENSION IN SYSTOLE: 4.23 CM (ref 2.1–4)
LEFT VENTRICLE DIASTOLIC VOLUME INDEX: 87.89 ML/M2
LEFT VENTRICLE DIASTOLIC VOLUME: 174.9 ML
LEFT VENTRICLE MASS INDEX: 156 G/M2
LEFT VENTRICLE SYSTOLIC VOLUME INDEX: 40.1 ML/M2
LEFT VENTRICLE SYSTOLIC VOLUME: 79.76 ML
LEFT VENTRICULAR INTERNAL DIMENSION IN DIASTOLE: 5.93 CM (ref 3.5–6)
LEFT VENTRICULAR MASS: 309.74 G
LV LATERAL E/E' RATIO: 13.56 M/S
LV SEPTAL E/E' RATIO: 30.5 M/S
LVOT MG: 2.26 MMHG
LVOT MV: 0.72 CM/S
MV A" WAVE DURATION": 125.59 MSEC
MV MEAN GRADIENT: 2 MMHG
MV PEAK A VEL: 0.48 M/S
MV PEAK E VEL: 1.22 M/S
MV PEAK GRADIENT: 6 MMHG
MV STENOSIS PRESSURE HALF TIME: 55.79 MS
MV VALVE AREA BY CONTINUITY EQUATION: 2.93 CM2
MV VALVE AREA P 1/2 METHOD: 3.94 CM2
OHS CV CPX 85 PERCENT MAX PREDICTED HEART RATE MALE: 134
OHS CV CPX MAX PREDICTED HEART RATE: 158
OHS CV CPX PATIENT IS FEMALE: 0
OHS CV CPX PATIENT IS MALE: 1
OHS CV CPX PEAK DIASTOLIC BLOOD PRESSURE: 58 MMHG
OHS CV CPX PEAK HEAR RATE: 73 BPM
OHS CV CPX PEAK RATE PRESSURE PRODUCT: NORMAL
OHS CV CPX PEAK SYSTOLIC BLOOD PRESSURE: 145 MMHG
OHS CV CPX PERCENT MAX PREDICTED HEART RATE ACHIEVED: 46
OHS CV CPX RATE PRESSURE PRODUCT PRESENTING: 8742
OHS LV EJECTION FRACTION SIMPSONS BIPLANE MOD: 50 %
P-ANCA TITR SER IF: NORMAL TITER
PISA TR MAX VEL: 2.75 M/S
PULM VEIN S/D RATIO: 0.71
PV MV: 1.03 M/S
PV PEAK D VEL: 0.79 M/S
PV PEAK GRADIENT: 8 MMHG
PV PEAK S VEL: 0.56 M/S
PV PEAK VELOCITY: 1.43 M/S
RA MAJOR: 4.78 CM
RA PRESSURE ESTIMATED: 3 MMHG
RA WIDTH: 4.31 CM
RIGHT VENTRICLE DIASTOLIC MID DIMENSION: 3.4 CM
RIGHT VENTRICULAR END-DIASTOLIC DIMENSION: 4.8 CM
RV TB RVSP: 6 MMHG
RV TISSUE DOPPLER FREE WALL SYSTOLIC VELOCITY 1 (APICAL 4 CHAMBER VIEW): 15.36 CM/S
SINUS: 3.93 CM
STJ: 3.42 CM
SYSTOLIC BLOOD PRESSURE: 141 MMHG
TDI LATERAL: 0.09 M/S
TDI SEPTAL: 0.04 M/S
TDI: 0.07 M/S
TR MAX PG: 30 MMHG
TRICUSPID ANNULAR PLANE SYSTOLIC EXCURSION: 2.3 CM
TV REST PULMONARY ARTERY PRESSURE: 33 MMHG
Z-SCORE OF LEFT VENTRICULAR DIMENSION IN END DIASTOLE: 0.27
Z-SCORE OF LEFT VENTRICULAR DIMENSION IN END SYSTOLE: 1.4

## 2024-03-18 PROCEDURE — 63600175 PHARM REV CODE 636 W HCPCS: Mod: NTX | Performed by: STUDENT IN AN ORGANIZED HEALTH CARE EDUCATION/TRAINING PROGRAM

## 2024-03-18 PROCEDURE — 93018 CV STRESS TEST I&R ONLY: CPT | Mod: NTX,,, | Performed by: STUDENT IN AN ORGANIZED HEALTH CARE EDUCATION/TRAINING PROGRAM

## 2024-03-18 PROCEDURE — 93017 CV STRESS TEST TRACING ONLY: CPT | Mod: TXP

## 2024-03-18 PROCEDURE — 93016 CV STRESS TEST SUPVJ ONLY: CPT | Mod: NTX,,, | Performed by: STUDENT IN AN ORGANIZED HEALTH CARE EDUCATION/TRAINING PROGRAM

## 2024-03-18 PROCEDURE — 78452 HT MUSCLE IMAGE SPECT MULT: CPT | Mod: 26,NTX,, | Performed by: STUDENT IN AN ORGANIZED HEALTH CARE EDUCATION/TRAINING PROGRAM

## 2024-03-18 PROCEDURE — 93306 TTE W/DOPPLER COMPLETE: CPT | Mod: NTX

## 2024-03-18 PROCEDURE — 93306 TTE W/DOPPLER COMPLETE: CPT | Mod: 26,NTX,, | Performed by: STUDENT IN AN ORGANIZED HEALTH CARE EDUCATION/TRAINING PROGRAM

## 2024-03-18 PROCEDURE — 78452 HT MUSCLE IMAGE SPECT MULT: CPT | Mod: TC,TXP

## 2024-03-18 PROCEDURE — A9502 TC99M TETROFOSMIN: HCPCS | Mod: NTX | Performed by: STUDENT IN AN ORGANIZED HEALTH CARE EDUCATION/TRAINING PROGRAM

## 2024-03-18 RX ORDER — REGADENOSON 0.08 MG/ML
0.4 INJECTION, SOLUTION INTRAVENOUS ONCE
Status: COMPLETED | OUTPATIENT
Start: 2024-03-18 | End: 2024-03-18

## 2024-03-18 RX ADMIN — REGADENOSON 0.4 MG: 0.08 INJECTION, SOLUTION INTRAVENOUS at 11:03

## 2024-03-18 RX ADMIN — TETROFOSMIN 10.8 MILLICURIE: 1.38 INJECTION, POWDER, LYOPHILIZED, FOR SOLUTION INTRAVENOUS at 09:03

## 2024-03-18 RX ADMIN — TETROFOSMIN 32 MILLICURIE: 1.38 INJECTION, POWDER, LYOPHILIZED, FOR SOLUTION INTRAVENOUS at 11:03

## 2024-03-19 LAB — PATHOLOGIST INTERPRETATION UIFE: NORMAL

## 2024-03-26 ENCOUNTER — TELEPHONE (OUTPATIENT)
Dept: INFUSION THERAPY | Facility: HOSPITAL | Age: 64
End: 2024-03-26
Payer: COMMERCIAL

## 2024-03-27 DIAGNOSIS — I25.10 ATHEROSCLEROSIS OF NATIVE CORONARY ARTERY OF NATIVE HEART WITHOUT ANGINA PECTORIS: ICD-10-CM

## 2024-03-27 DIAGNOSIS — I48.19 PERSISTENT ATRIAL FIBRILLATION: ICD-10-CM

## 2024-03-27 DIAGNOSIS — R07.9 CHEST PAIN, UNSPECIFIED TYPE: ICD-10-CM

## 2024-03-28 RX ORDER — RANOLAZINE 500 MG/1
500 TABLET, EXTENDED RELEASE ORAL 2 TIMES DAILY
Qty: 180 TABLET | Refills: 3 | Status: SHIPPED | OUTPATIENT
Start: 2024-03-28 | End: 2024-06-07 | Stop reason: SDUPTHER

## 2024-03-28 RX ORDER — CLOPIDOGREL BISULFATE 75 MG/1
75 TABLET ORAL DAILY
Qty: 90 TABLET | Refills: 3 | Status: SHIPPED | OUTPATIENT
Start: 2024-03-28 | End: 2024-06-07 | Stop reason: SDUPTHER

## 2024-04-03 ENCOUNTER — TELEPHONE (OUTPATIENT)
Dept: INFUSION THERAPY | Facility: HOSPITAL | Age: 64
End: 2024-04-03
Payer: COMMERCIAL

## 2024-04-03 NOTE — TELEPHONE ENCOUNTER
Spoke with the patient. Let him know that retacrit injections can be cancelled. Dr. Payne (nephro) reached out to let us know that he is receiving Micera and venofer in dialysis so he does not need montly retacrit in infusion center. Dr. Tracynon aware. Pt verbalized understanding.

## 2024-04-04 ENCOUNTER — LAB VISIT (OUTPATIENT)
Dept: LAB | Facility: HOSPITAL | Age: 64
End: 2024-04-04
Attending: INTERNAL MEDICINE
Payer: COMMERCIAL

## 2024-04-04 ENCOUNTER — OFFICE VISIT (OUTPATIENT)
Dept: NEUROSURGERY | Facility: CLINIC | Age: 64
End: 2024-04-04
Payer: COMMERCIAL

## 2024-04-04 VITALS
HEIGHT: 70 IN | BODY MASS INDEX: 25.47 KG/M2 | SYSTOLIC BLOOD PRESSURE: 134 MMHG | HEART RATE: 58 BPM | DIASTOLIC BLOOD PRESSURE: 64 MMHG | WEIGHT: 177.94 LBS

## 2024-04-04 DIAGNOSIS — N18.4 ANEMIA DUE TO STAGE 4 CHRONIC KIDNEY DISEASE: ICD-10-CM

## 2024-04-04 DIAGNOSIS — D63.1 ANEMIA DUE TO STAGE 4 CHRONIC KIDNEY DISEASE: ICD-10-CM

## 2024-04-04 DIAGNOSIS — N18.4 CKD (CHRONIC KIDNEY DISEASE) STAGE 4, GFR 15-29 ML/MIN: ICD-10-CM

## 2024-04-04 DIAGNOSIS — S32.009D CLOSED FRACTURE OF TRANSVERSE PROCESS OF LUMBAR VERTEBRA WITH ROUTINE HEALING, SUBSEQUENT ENCOUNTER: Primary | ICD-10-CM

## 2024-04-04 LAB
BASOPHILS # BLD AUTO: 0.04 K/UL (ref 0–0.2)
BASOPHILS NFR BLD: 0.9 % (ref 0–1.9)
DIFFERENTIAL METHOD BLD: ABNORMAL
EOSINOPHIL # BLD AUTO: 0.1 K/UL (ref 0–0.5)
EOSINOPHIL NFR BLD: 2.8 % (ref 0–8)
ERYTHROCYTE [DISTWIDTH] IN BLOOD BY AUTOMATED COUNT: 13.6 % (ref 11.5–14.5)
HCT VFR BLD AUTO: 20 % (ref 40–54)
HGB BLD-MCNC: 6.8 G/DL (ref 14–18)
IMM GRANULOCYTES # BLD AUTO: 0.01 K/UL (ref 0–0.04)
IMM GRANULOCYTES NFR BLD AUTO: 0.2 % (ref 0–0.5)
LYMPHOCYTES # BLD AUTO: 1 K/UL (ref 1–4.8)
LYMPHOCYTES NFR BLD: 21.5 % (ref 18–48)
MCH RBC QN AUTO: 29.7 PG (ref 27–31)
MCHC RBC AUTO-ENTMCNC: 34 G/DL (ref 32–36)
MCV RBC AUTO: 87 FL (ref 82–98)
MONOCYTES # BLD AUTO: 0.7 K/UL (ref 0.3–1)
MONOCYTES NFR BLD: 14.8 % (ref 4–15)
NEUTROPHILS # BLD AUTO: 2.8 K/UL (ref 1.8–7.7)
NEUTROPHILS NFR BLD: 59.8 % (ref 38–73)
NRBC BLD-RTO: 0 /100 WBC
PLATELET # BLD AUTO: 186 K/UL (ref 150–450)
PMV BLD AUTO: 10.2 FL (ref 9.2–12.9)
RBC # BLD AUTO: 2.29 M/UL (ref 4.6–6.2)
WBC # BLD AUTO: 4.61 K/UL (ref 3.9–12.7)

## 2024-04-04 PROCEDURE — 3066F NEPHROPATHY DOC TX: CPT | Mod: CPTII,NTX,S$GLB, | Performed by: PHYSICIAN ASSISTANT

## 2024-04-04 PROCEDURE — 1160F RVW MEDS BY RX/DR IN RCRD: CPT | Mod: CPTII,NTX,S$GLB, | Performed by: PHYSICIAN ASSISTANT

## 2024-04-04 PROCEDURE — 99204 OFFICE O/P NEW MOD 45 MIN: CPT | Mod: NTX,S$GLB,, | Performed by: PHYSICIAN ASSISTANT

## 2024-04-04 PROCEDURE — 1159F MED LIST DOCD IN RCRD: CPT | Mod: CPTII,NTX,S$GLB, | Performed by: PHYSICIAN ASSISTANT

## 2024-04-04 PROCEDURE — 3078F DIAST BP <80 MM HG: CPT | Mod: CPTII,NTX,S$GLB, | Performed by: PHYSICIAN ASSISTANT

## 2024-04-04 PROCEDURE — 3075F SYST BP GE 130 - 139MM HG: CPT | Mod: CPTII,NTX,S$GLB, | Performed by: PHYSICIAN ASSISTANT

## 2024-04-04 PROCEDURE — 3062F POS MACROALBUMINURIA REV: CPT | Mod: CPTII,NTX,S$GLB, | Performed by: PHYSICIAN ASSISTANT

## 2024-04-04 PROCEDURE — 3008F BODY MASS INDEX DOCD: CPT | Mod: CPTII,NTX,S$GLB, | Performed by: PHYSICIAN ASSISTANT

## 2024-04-04 PROCEDURE — 99999 PR PBB SHADOW E&M-EST. PATIENT-LVL IV: CPT | Mod: PBBFAC,TXP,, | Performed by: PHYSICIAN ASSISTANT

## 2024-04-04 PROCEDURE — 36415 COLL VENOUS BLD VENIPUNCTURE: CPT | Mod: TXP | Performed by: INTERNAL MEDICINE

## 2024-04-04 PROCEDURE — 85025 COMPLETE CBC W/AUTO DIFF WBC: CPT | Mod: NTX | Performed by: INTERNAL MEDICINE

## 2024-04-04 NOTE — PROGRESS NOTES
"  Subjective:     Patient ID:  Domingo Gross is a 63 y.o. male.    Marvin    Chief Complaint:  Hospital follow-up right L4 transverse process fracture    HPI    Domingo Gross is a 63 y.o. male who presents for follow up.  Patient here for follow-up.  He denies any back pain.  No leg pain or paresthesias.  No current complaints with neck and back.    Patient denies any recent accidents or trauma, no saddle anesthesias, and no bowel or bladder incontinence.      Review of Systems:  Constitution: Negative for chills, fever, night sweats and weight loss.   Musculoskeletal: Negative for falls.   Gastrointestinal: Negative for bowel incontinence, nausea and vomiting.   Genitourinary: Negative for bladder incontinence.   Neurological: Negative for disturbances in coordination and loss of balance.      Objective:      Vitals:    04/04/24 1458   BP: 134/64   Pulse: (!) 58   Weight: 80.7 kg (177 lb 14.6 oz)   Height: 5' 10" (1.778 m)   PainSc: 0-No pain         Physical Exam:      General:  Domingo Gross is well-developed, well-nourished, appears stated age, in no acute distress, alert and oriented to person, place, and time.    Pulmonary/Chest:  Respiratory effort normal  Abdominal: Exhibits no distension  Psychiatric:  Normal mood and affect.  Behavior is normal.  Judgement and thought content normal      Musculoskeletal:      Lumbar ROM:   No pain in lumbar flexion, extension, left lateral bending, and right lateral bending.    Lumbar Spine Inspection:  Normal with no surgical scars with no visible rashes.    Lumbar Spine Palpation:  No tenderness to low back palpation.          Neurological:  Alert and oriented to person, place, and time    Muscle strength against resistance:     Right Left   Hip flexion  5 / 5 5 / 5   Hip extension 5 / 5 5 / 5   Hip abduction 5 / 5 5 / 5   Hip adduction  5 / 5 5 / 5   Knee extension  5 / 5 5 / 5   Knee flexion 5 / 5 5 / 5   Dorsiflexion  5 / 5 5 / 5   EHL  5 / 5 5 / 5 "   Plantar flexion  5 / 5 5 / 5   Inversion of the feet 5 / 5 5 / 5   Eversion of the feet  5 / 5 5 / 5       Reflexes:     Right Left   Patellar 2+ 2+   Achilles 2+ 2+     Clonus:  Negative bilaterally    On gross examination of the bilateral upper extremities, patient has full painfree ROM with no signs of clubbing, cyanosis, edema, or weakness.       CT Results:     Impression:     Partially visualized left perinephric hematoma, likely related to patient's recent kidney biopsy.  If there is concern for active extravasation, recommend further evaluation with CTA of the abdomen and pelvis.     Nondisplaced fracture of the right L4 transverse process.     Small left pleural effusion.     Bilateral lower lobe pulmonary opacities, partially imaged.     Nonspecific presacral edema.     This report was flagged in Epic as abnormal.     Dr. Babb discussed critical findings with Dr. Fink by telephone at 21:24 on 02/15/2024.        Electronically signed by: Dio Babb  Date:                                            02/15/2024  Time:                                           21:25    Assessment:          1. Closed fracture of transverse process of lumbar vertebra with routine healing, subsequent encounter            Plan:             Patient without back pain.  Doing well.  Do not recommend any further imaging with MRI or x-rays at this time.      Follow-up with Neurosurgery as needed.      Follow-Up:  Follow up if symptoms worsen or fail to improve. If there are any questions prior to this, the patient was instructed to contact the office.       Cynthia Diaz [facility], PA-C  Neurosurgery  [facility]  04/04/2024

## 2024-04-05 ENCOUNTER — LAB VISIT (OUTPATIENT)
Dept: LAB | Facility: HOSPITAL | Age: 64
End: 2024-04-05
Attending: INTERNAL MEDICINE
Payer: COMMERCIAL

## 2024-04-05 DIAGNOSIS — E03.9 HYPOTHYROIDISM, UNSPECIFIED TYPE: ICD-10-CM

## 2024-04-05 DIAGNOSIS — D64.9 SYMPTOMATIC ANEMIA: ICD-10-CM

## 2024-04-05 LAB
HGB BLD-MCNC: 7.4 G/DL (ref 14–18)
TREPONEMA PALLIDUM IGG+IGM AB [PRESENCE] IN SERUM OR PLASMA BY IMMUNOASSAY: NONREACTIVE

## 2024-04-05 PROCEDURE — 86780 TREPONEMA PALLIDUM: CPT | Mod: NTX | Performed by: INTERNAL MEDICINE

## 2024-04-05 PROCEDURE — 86593 SYPHILIS TEST NON-TREP QUANT: CPT | Mod: NTX | Performed by: INTERNAL MEDICINE

## 2024-04-05 PROCEDURE — 86592 SYPHILIS TEST NON-TREP QUAL: CPT | Mod: TXP | Performed by: INTERNAL MEDICINE

## 2024-04-05 PROCEDURE — 36415 COLL VENOUS BLD VENIPUNCTURE: CPT | Mod: NTX | Performed by: INTERNAL MEDICINE

## 2024-04-05 PROCEDURE — 85018 HEMOGLOBIN: CPT | Mod: TXP | Performed by: INTERNAL MEDICINE

## 2024-04-05 RX ORDER — LEVOTHYROXINE SODIUM 50 UG/1
TABLET ORAL
Qty: 30 TABLET | Refills: 11 | OUTPATIENT
Start: 2024-04-05

## 2024-04-05 NOTE — TELEPHONE ENCOUNTER
No care due was identified.  [facility] Embedded Care Due Messages. Reference number: [id number].   4/05/2024 10:18:31 AM CDT

## 2024-04-05 NOTE — TELEPHONE ENCOUNTER
Refill Decision Note   Domingo Gross  is requesting a refill authorization.  Brief Assessment and Rationale for Refill:  Quick Discontinue     Medication Therapy Plan:  Dose increased to 75mcg on 2/10/24      Comments:     Note composed:10:25 AM 04/05/2024

## 2024-04-08 LAB — RPR SER QL: NORMAL

## 2024-04-10 LAB — T PALLIDUM AB SER QL IF: ABNORMAL

## 2024-04-15 ENCOUNTER — ANESTHESIA EVENT (OUTPATIENT)
Dept: SURGERY | Facility: HOSPITAL | Age: 64
End: 2024-04-15
Payer: COMMERCIAL

## 2024-04-15 ENCOUNTER — HOSPITAL ENCOUNTER (OUTPATIENT)
Facility: HOSPITAL | Age: 64
Discharge: HOME | End: 2024-04-15
Attending: SURGERY | Admitting: SURGERY
Payer: COMMERCIAL

## 2024-04-15 ENCOUNTER — ANESTHESIA (OUTPATIENT)
Dept: SURGERY | Facility: HOSPITAL | Age: 64
End: 2024-04-15
Payer: COMMERCIAL

## 2024-04-15 DIAGNOSIS — I10 PRIMARY HYPERTENSION: ICD-10-CM

## 2024-04-15 DIAGNOSIS — N18.4 CKD (CHRONIC KIDNEY DISEASE) STAGE 4, GFR 15-29 ML/MIN: ICD-10-CM

## 2024-04-15 DIAGNOSIS — I73.9 CLAUDICATION IN PERIPHERAL VASCULAR DISEASE: Primary | ICD-10-CM

## 2024-04-15 DIAGNOSIS — N18.6 ESRD (END STAGE RENAL DISEASE): ICD-10-CM

## 2024-04-15 LAB
ANION GAP SERPL CALC-SCNC: 10 MMOL/L (ref 8–16)
BUN SERPL-MCNC: 23 MG/DL (ref 8–23)
CALCIUM SERPL-MCNC: 8.8 MG/DL (ref 8.7–10.5)
CHLORIDE SERPL-SCNC: 104 MMOL/L (ref 95–110)
CO2 SERPL-SCNC: 25 MMOL/L (ref 23–29)
CREAT SERPL-MCNC: 3.7 MG/DL (ref 0.5–1.4)
EST. GFR  (NO RACE VARIABLE): 18 ML/MIN/1.73 M^2
GLUCOSE SERPL-MCNC: 93 MG/DL (ref 70–110)
POTASSIUM SERPL-SCNC: 2.9 MMOL/L (ref 3.5–5.1)
SODIUM SERPL-SCNC: 139 MMOL/L (ref 136–145)

## 2024-04-15 PROCEDURE — 63600175 PHARM REV CODE 636 W HCPCS: Mod: TXP | Performed by: SURGERY

## 2024-04-15 PROCEDURE — D9220A PRA ANESTHESIA: Mod: CRNA,,, | Performed by: NURSE ANESTHETIST, CERTIFIED REGISTERED

## 2024-04-15 PROCEDURE — 36000707: Mod: TXP | Performed by: SURGERY

## 2024-04-15 PROCEDURE — 64415 NJX AA&/STRD BRCH PLXS IMG: CPT | Mod: 59,LT,NTX | Performed by: STUDENT IN AN ORGANIZED HEALTH CARE EDUCATION/TRAINING PROGRAM

## 2024-04-15 PROCEDURE — 37000009 HC ANESTHESIA EA ADD 15 MINS: Mod: TXP | Performed by: SURGERY

## 2024-04-15 PROCEDURE — 25000003 PHARM REV CODE 250: Mod: TXP | Performed by: NURSE ANESTHETIST, CERTIFIED REGISTERED

## 2024-04-15 PROCEDURE — 71000033 HC RECOVERY, INTIAL HOUR: Mod: NTX | Performed by: SURGERY

## 2024-04-15 PROCEDURE — 25000003 PHARM REV CODE 250: Mod: NTX | Performed by: SURGERY

## 2024-04-15 PROCEDURE — 36415 COLL VENOUS BLD VENIPUNCTURE: CPT | Mod: NTX | Performed by: SURGERY

## 2024-04-15 PROCEDURE — 63600175 PHARM REV CODE 636 W HCPCS: Mod: NTX | Performed by: NURSE ANESTHETIST, CERTIFIED REGISTERED

## 2024-04-15 PROCEDURE — 27201423 OPTIME MED/SURG SUP & DEVICES STERILE SUPPLY: Mod: NTX | Performed by: SURGERY

## 2024-04-15 PROCEDURE — 71000016 HC POSTOP RECOV ADDL HR: Mod: TXP | Performed by: SURGERY

## 2024-04-15 PROCEDURE — 37000008 HC ANESTHESIA 1ST 15 MINUTES: Mod: NTX | Performed by: SURGERY

## 2024-04-15 PROCEDURE — 80048 BASIC METABOLIC PNL TOTAL CA: CPT | Mod: NTX | Performed by: SURGERY

## 2024-04-15 PROCEDURE — C1768 GRAFT, VASCULAR: HCPCS | Mod: TXP | Performed by: SURGERY

## 2024-04-15 PROCEDURE — 63600175 PHARM REV CODE 636 W HCPCS: Mod: JZ,JG,TXP | Performed by: STUDENT IN AN ORGANIZED HEALTH CARE EDUCATION/TRAINING PROGRAM

## 2024-04-15 PROCEDURE — D9220A PRA ANESTHESIA: Mod: ANES,,, | Performed by: STUDENT IN AN ORGANIZED HEALTH CARE EDUCATION/TRAINING PROGRAM

## 2024-04-15 PROCEDURE — 71000015 HC POSTOP RECOV 1ST HR: Mod: TXP | Performed by: SURGERY

## 2024-04-15 PROCEDURE — 36000706: Mod: TXP | Performed by: SURGERY

## 2024-04-15 DEVICE — IMPLANTABLE DEVICE: Type: IMPLANTABLE DEVICE | Site: ARM | Status: FUNCTIONAL

## 2024-04-15 RX ORDER — HYDROCODONE BITARTRATE AND ACETAMINOPHEN 5; 325 MG/1; MG/1
1 TABLET ORAL EVERY 6 HOURS PRN
Qty: 24 TABLET | Refills: 0 | Status: SHIPPED | OUTPATIENT
Start: 2024-04-15

## 2024-04-15 RX ORDER — PHENYLEPHRINE HYDROCHLORIDE 10 MG/ML
INJECTION INTRAVENOUS
Status: DISCONTINUED | OUTPATIENT
Start: 2024-04-15 | End: 2024-04-15

## 2024-04-15 RX ORDER — FENTANYL CITRATE 50 UG/ML
INJECTION, SOLUTION INTRAMUSCULAR; INTRAVENOUS
Status: DISCONTINUED | OUTPATIENT
Start: 2024-04-15 | End: 2024-04-15

## 2024-04-15 RX ORDER — OXYCODONE HYDROCHLORIDE 5 MG/1
5 TABLET ORAL
Status: DISCONTINUED | OUTPATIENT
Start: 2024-04-15 | End: 2024-04-15 | Stop reason: HOSPADM

## 2024-04-15 RX ORDER — ONDANSETRON HYDROCHLORIDE 2 MG/ML
4 INJECTION, SOLUTION INTRAVENOUS DAILY PRN
Status: DISCONTINUED | OUTPATIENT
Start: 2024-04-15 | End: 2024-04-15 | Stop reason: HOSPADM

## 2024-04-15 RX ORDER — PROPOFOL 10 MG/ML
VIAL (ML) INTRAVENOUS CONTINUOUS PRN
Status: DISCONTINUED | OUTPATIENT
Start: 2024-04-15 | End: 2024-04-15

## 2024-04-15 RX ORDER — SODIUM CHLORIDE 9 MG/ML
INJECTION, SOLUTION INTRAVENOUS CONTINUOUS
Status: DISCONTINUED | OUTPATIENT
Start: 2024-04-15 | End: 2024-04-15 | Stop reason: HOSPADM

## 2024-04-15 RX ORDER — DEXMEDETOMIDINE HYDROCHLORIDE 100 UG/ML
INJECTION, SOLUTION INTRAVENOUS
Status: DISCONTINUED | OUTPATIENT
Start: 2024-04-15 | End: 2024-04-15

## 2024-04-15 RX ORDER — BUPIVACAINE HYDROCHLORIDE 5 MG/ML
INJECTION, SOLUTION EPIDURAL; INTRACAUDAL
Status: COMPLETED | OUTPATIENT
Start: 2024-04-15 | End: 2024-04-15

## 2024-04-15 RX ORDER — PROPOFOL 10 MG/ML
VIAL (ML) INTRAVENOUS
Status: DISCONTINUED | OUTPATIENT
Start: 2024-04-15 | End: 2024-04-15

## 2024-04-15 RX ORDER — CEFAZOLIN SODIUM 1 G/3ML
INJECTION, POWDER, FOR SOLUTION INTRAMUSCULAR; INTRAVENOUS
Status: DISCONTINUED | OUTPATIENT
Start: 2024-04-15 | End: 2024-04-15

## 2024-04-15 RX ORDER — LIDOCAINE HYDROCHLORIDE 10 MG/ML
INJECTION, SOLUTION EPIDURAL; INFILTRATION; INTRACAUDAL; PERINEURAL
Status: DISCONTINUED | OUTPATIENT
Start: 2024-04-15 | End: 2024-04-15 | Stop reason: HOSPADM

## 2024-04-15 RX ORDER — MUPIROCIN 20 MG/G
OINTMENT TOPICAL
Status: DISCONTINUED | OUTPATIENT
Start: 2024-04-15 | End: 2024-04-15 | Stop reason: HOSPADM

## 2024-04-15 RX ORDER — HEPARIN SODIUM 1000 [USP'U]/ML
INJECTION, SOLUTION INTRAVENOUS; SUBCUTANEOUS
Status: DISCONTINUED | OUTPATIENT
Start: 2024-04-15 | End: 2024-04-15 | Stop reason: HOSPADM

## 2024-04-15 RX ORDER — MIDAZOLAM HYDROCHLORIDE 1 MG/ML
INJECTION INTRAMUSCULAR; INTRAVENOUS
Status: DISCONTINUED | OUTPATIENT
Start: 2024-04-15 | End: 2024-04-15

## 2024-04-15 RX ORDER — LIDOCAINE HYDROCHLORIDE 20 MG/ML
INJECTION, SOLUTION EPIDURAL; INFILTRATION; INTRACAUDAL; PERINEURAL
Status: DISCONTINUED | OUTPATIENT
Start: 2024-04-15 | End: 2024-04-15

## 2024-04-15 RX ORDER — ACETAMINOPHEN 325 MG/1
650 TABLET ORAL EVERY 4 HOURS PRN
Status: DISCONTINUED | OUTPATIENT
Start: 2024-04-15 | End: 2024-04-15 | Stop reason: HOSPADM

## 2024-04-15 RX ORDER — HYDROMORPHONE HYDROCHLORIDE 2 MG/ML
0.2 INJECTION, SOLUTION INTRAMUSCULAR; INTRAVENOUS; SUBCUTANEOUS EVERY 5 MIN PRN
Status: DISCONTINUED | OUTPATIENT
Start: 2024-04-15 | End: 2024-04-15 | Stop reason: HOSPADM

## 2024-04-15 RX ORDER — VASOPRESSIN 20 [USP'U]/ML
INJECTION, SOLUTION INTRAMUSCULAR; SUBCUTANEOUS
Status: DISCONTINUED | OUTPATIENT
Start: 2024-04-15 | End: 2024-04-15

## 2024-04-15 RX ORDER — FENTANYL CITRATE 50 UG/ML
25 INJECTION, SOLUTION INTRAMUSCULAR; INTRAVENOUS EVERY 5 MIN PRN
Status: DISCONTINUED | OUTPATIENT
Start: 2024-04-15 | End: 2024-04-15 | Stop reason: HOSPADM

## 2024-04-15 RX ORDER — HYDROCODONE BITARTRATE AND ACETAMINOPHEN 5; 325 MG/1; MG/1
1 TABLET ORAL EVERY 4 HOURS PRN
Status: DISCONTINUED | OUTPATIENT
Start: 2024-04-15 | End: 2024-04-15 | Stop reason: HOSPADM

## 2024-04-15 RX ADMIN — LIDOCAINE HYDROCHLORIDE 60 MG: 20 INJECTION, SOLUTION EPIDURAL; INFILTRATION; INTRACAUDAL; PERINEURAL at 01:04

## 2024-04-15 RX ADMIN — FENTANYL CITRATE 25 MCG: 50 INJECTION INTRAMUSCULAR; INTRAVENOUS at 01:04

## 2024-04-15 RX ADMIN — VASOPRESSIN 1 UNITS: 20 INJECTION INTRAVENOUS at 03:04

## 2024-04-15 RX ADMIN — GLYCOPYRROLATE 0.2 MG: 0.2 INJECTION, SOLUTION INTRAMUSCULAR; INTRAVITREAL at 01:04

## 2024-04-15 RX ADMIN — VASOPRESSIN 2 UNITS: 20 INJECTION INTRAVENOUS at 03:04

## 2024-04-15 RX ADMIN — PHENYLEPHRINE HYDROCHLORIDE 300 MCG: 10 INJECTION INTRAVENOUS at 02:04

## 2024-04-15 RX ADMIN — PHENYLEPHRINE HYDROCHLORIDE 200 MCG: 10 INJECTION INTRAVENOUS at 02:04

## 2024-04-15 RX ADMIN — DEXMEDETOMIDINE HCL 4 MCG: 100 INJECTION INTRAVENOUS at 01:04

## 2024-04-15 RX ADMIN — VASOPRESSIN 1 UNITS: 20 INJECTION INTRAVENOUS at 02:04

## 2024-04-15 RX ADMIN — CEFAZOLIN 2 G: 330 INJECTION, POWDER, FOR SOLUTION INTRAMUSCULAR; INTRAVENOUS at 01:04

## 2024-04-15 RX ADMIN — BUPIVACAINE HYDROCHLORIDE 30 ML: 5 INJECTION, SOLUTION EPIDURAL; INTRACAUDAL; PERINEURAL at 12:04

## 2024-04-15 RX ADMIN — PROPOFOL 150 MCG/KG/MIN: 10 INJECTION, EMULSION INTRAVENOUS at 01:04

## 2024-04-15 RX ADMIN — PHENYLEPHRINE HYDROCHLORIDE 100 MCG: 10 INJECTION INTRAVENOUS at 01:04

## 2024-04-15 RX ADMIN — FENTANYL CITRATE 25 MCG: 50 INJECTION INTRAMUSCULAR; INTRAVENOUS at 02:04

## 2024-04-15 RX ADMIN — SODIUM CHLORIDE: 0.9 INJECTION, SOLUTION INTRAVENOUS at 01:04

## 2024-04-15 RX ADMIN — PROPOFOL 50 MG: 10 INJECTION, EMULSION INTRAVENOUS at 01:04

## 2024-04-15 RX ADMIN — MIDAZOLAM HYDROCHLORIDE 2 MG: 1 INJECTION, SOLUTION INTRAMUSCULAR; INTRAVENOUS at 12:04

## 2024-04-15 NOTE — DISCHARGE INSTRUCTIONS
ANESTHESIA  -For the first 24 hours after surgery:  Do not drive, use heavy equipment, make important decisions, or drink alcohol  -It is normal to feel sleepy for several hours.  Rest until you are more awake.  -Have someone stay with you, if needed.  They can watch for problems and help keep you safe.  -Some possible post anesthesia side effects include: nausea and vomiting, sore throat and hoarseness, sleepiness, and dizziness.    PAIN  -If you have pain after surgery, pain medicine will help you feel better.  Take it as directed, before pain becomes severe.  Most pain relievers taken by mouth need at least 20-30 minutes to start working.  -Do not drive or drink alcohol while taking pain medicine.  -Pain medication can upset your stomach.  Taking them with a little food may help.  -Other ways to help control pain: elevation, ice, and relaxation  -Call your surgeon if still having unmanageable pain an hour after taking pain medicine.  -Pain medicine can cause constipation.  Taking an over-the counter stool softener while on prescription pain medicine and drinking plenty of fluids can prevent this side effect.  -Call your surgeon if you have severe side effects like: breathing problems, trouble waking up, dizziness, confusion, or severe constipation.    NAUSEA  -Some people have nausea after surgery.  This is often because of anesthesia, pain, pain medicine, or the stress of surgery.  -Do not push yourself to eat.  Start off with clear liquids and soup.  Slowly move to solid foods.  Don't eat fatty, rich, spicy foods at first.  Eat smaller amounts.  -If you develop persistent nausea and vomiting please notify your surgeon immediately.    BLEEDING  -Different types of surgery require different types of care and dressing changes.  It is important to follow all instructions and advice from your surgeon.  Change dressing as directed.  Call your surgeon for any concerns regarding postop bleeding.    SIGNS OF  Cardiology Progress Note    Subjective:  Overall feeling better. She notes dramatic improvement in generalized edema. I/O +12,000 since admission, -2700 yesterday.   No SOB or chest pain. Not requiring O2.     Current Medications:   warfarin  1 mg Oral Once    magnesium sulfate  2 g Intravenous Once    WARFARIN - PHARMACIST MONITORED   Does not apply See Admin Instructions    [Held by provider] amitriptyline  25 mg Per NG tube Nightly    bisacodyl  10 mg Rectal Once    insulin lispro   Subcutaneous 4 times per day    AMIODarone  200 mg Per J Tube Daily    pantoprazole  40 mg Per NG tube Daily    polyethylene glycol  17 g Per J Tube Daily    [Held by provider] sucralfate  1 g Per J Tube 4x Daily AC & HS    [Held by provider] pravastatin  40 mg Per J Tube Nightly    [Held by provider] metoPROLOL succinate  75 mg Oral BID    sodium chloride (PF)  2 mL Intracatheter 2 times per day    [Held by provider] verapamil  180 mg Oral Daily       furosemide (LASIX INJECT) 250 mg/125 mL in sodium chloride 0.9 % infusion 2.5 mg/hr (02/06/22 0928)       Objective:  Vital signs in last 24 hours:  Temp:  [97.7 °F (36.5 °C)-98.8 °F (37.1 °C)] 98.8 °F (37.1 °C)  Heart Rate:  [] 102  Resp:  [13-21] 15  BP: (133-146)/(62-68) 146/65    Weight:  Weight    02/01/22 0540 02/03/22 0339 02/04/22 1500 02/05/22 1515   Weight: 92.1 kg (203 lb 0.7 oz) 97.4 kg (214 lb 11.7 oz) 92.6 kg (204 lb 2.3 oz) 90.8 kg (200 lb 2.8 oz)       Vital Most Recent Value First Value   Weight 90.8 kg (200 lb 2.8 oz) Weight: 87.4 kg (192 lb 10.9 oz)   Height 5' 3\" (160 cm) Height: 5' 3\" (160 cm)       Intake/output:    Intake/Output Summary (Last 24 hours) at 2/6/2022 1100  Last data filed at 2/6/2022 1000  Gross per 24 hour   Intake 1646.4 ml   Output 4500 ml   Net -2853.6 ml                Physical Exam:     General:  Patient is alert and oriented x3.  Appears to be in no acute distress. NG tube   Heart:  irregular, rapid. +JVD, HJR   Lungs:  Clear to  INFECTION  -Signs of infection include: fever, swelling, drainage, and redness  -Notify your surgeon if you have a fever of 100.4 F (38.0 C) or higher.  -Notify your surgeon if you notice redness, swelling, increased pain, pus, or a foul smell at the incision site.    auscultation without wheezes, ronchi or rales.  Extremity:  No cyanosis, clubbing, + lower ext pitting edema.  Neurologic:  Grossly normal without any focal deficits.    Recent Labs   Lab 02/05/22  0537 02/04/22  0607 02/03/22  0531 02/02/22  0733 02/01/22  1839   WBC 8.4 8.9 12.3* 7.6 8.3   RBC 3.12* 2.73* 2.77* 2.83* 3.03*   HGB 8.8* 7.8* 8.0* 8.1* 8.7*   HCT 28.1* 24.3* 25.4* 25.8* 27.8*   MCV 90.1 89.0 91.7 91.2 91.7   * 361 380 316 322     Recent Labs     02/03/22  1116 02/04/22  0607 02/05/22  0542 02/06/22  0515   INR 2.3 1.9 1.7 1.8     Recent Labs   Lab 01/08/22  1320 07/19/21  0707   Cholesterol  --  184   HDL  --  34*   Triglycerides 73 180*   CALCLDL  --  114   Non-HDL Cholesterol  --  150     Recent Labs   Lab 02/06/22  0515 02/05/22  0537 02/04/22  0607 02/03/22  0531 02/02/22  2324 02/02/22  1611   SODIUM 134* 140 139 138  --  134*   POTASSIUM 3.6 3.2* 3.3* 4.7  4.6 5.6* 5.3*   CHLORIDE 95* 97* 99 101  --  100   CO2 35* 33* 30 23  --  18*   CREATININE 0.88 0.95 1.10* 1.41*  --  1.32*   BUN 11 14 19 23*  --  19   GLUCOSE 192* 147* 162* 162*  --  184*   ANIONGAP 8* 13 13 19  --  21*   MG  --  1.8  --  1.6*  --   --      Recent Labs   Lab 02/02/22  1611 02/02/22  0733 01/17/22  1532 01/13/22  0419 01/11/22  0456   Troponin I, High Sensitivity 14 11 12 14 12       Telemetry: -120s    Echocardiogram 10/18/2021  Aortic valve sclerosis.  Mild aortic valve regurgitation.  Moderatetosevere  tricuspid valve regurgitation.  Right ventricular systolic pressure 43.1 mmHg.  Moderate mitral valve regurgitation.  Moderately decreased right ventricular systolic function.  Normal left ventricular size, systolic function and wall thickness, with no regional wall motion abnormalities.    Limited Echo 2/2/2022  Severely increased right ventricular size with moderately decreased RV function on visual inspection.  The interventricular septum pushed into LV cavity from volume overload  Flattening of septum,  both in systole and diastole consistent with pressure and volume overload.  Severe to torrential TR from poor leaflet coaptation.  LV function has decreased from 56% to 47%  Compared to prior echo, 10/2020, RV is more dilated with severe to torrential TR and LVEF have dropped from 56% to 47%.         Assessment:  76 y/o male patient with h/o HTN, Dyslipidemia, DM 2, Morbid Obesity, CAD s/p 4 vCABG (LIMA to LAD, Sequential Diag, VG to OM2 and VG to PDA) + R and L AtriCure bipolar ablation + LA appendage stapling removal, Persistent atrial fibrillation, Remote DVT, Pancreatic pseudocyst from recent acute interstitial pancreatitis presented on 1/17 with sepsis concern, cardiology consulted for bi-ventricular failure evaluation.     . Congestive heart failure, predominately right sided: Severely increased RV size with dysfunction leading to severe-torrential TR from poor leaflet coaptation.    RV compromise appears to be more of slow volume build up state rather than acute compromise.  Volume status improving with diuresis. I/O -2700 yesterday. CO2 35. K 3.6.  . LVEF 47%, new onset, without regional wall motion abnormalities. RV dilatation causing interventricular septum to be pushed into LV cavity  . Persistent atrial fibrillation s/p R and L AtriCure bipolar ablation + LA appendage stapling removal - On eliquis. Rates 110-120s today.   . Elevated LFT's - hepatic congestion - improving. amio and statin currently held.     Recommendations /Plan:   . Still volume overloaded. Change to lasix 80 mg IV TID.   . Supplement K    . Monitor weight and I&O's closely.   . Restart metoprolol xl   . Plan to repeat Echo prior to discharge.  . Hold off on statin until LFT's normalize.      Eleonora Gasca PA-C  11:00 AM         I attest that I interviewed and examined the patient, reviewed the data and the care plan with Eleonora Gasca PA-C and agree with the documentation as outlined above.    I reviewed and edited the above note as  needed.       Brayden Pittman MD [facility]  Pager: [phone]  Cardiology

## 2024-04-15 NOTE — OP NOTE
[facility] ([facility])  Operative Note      Date of Procedure: 4/15/2024     Procedure: Procedure(s) (LRB):  INSERTION, GRAFT, ARTERIOVENOUS (Left)     Surgeons and Role:     * Scott Pascual MD - Primary    Assisting Surgeon: None    Pre-Operative Diagnosis: Primary hypertension [I10]  CKD (chronic kidney disease) stage 4, GFR 15-29 ml/min [N18.4]  ESRD (end stage renal disease) [N18.6]    Post-Operative Diagnosis: Post-Op Diagnosis Codes:     * Primary hypertension [I10]     * CKD (chronic kidney disease) stage 4, GFR 15-29 ml/min [N18.4]     * ESRD (end stage renal disease) [N18.6]    Anesthesia: General/Regional    Operative Findings (including complications, if any): insertion gortex graft left upper arm done under regional anaesthesia , patient tolerated well and was sent to recovery room in stable condition    Description of Technical Procedures: Operative Note       Surgery Date: 4/15/2024     Surgeons and Role:     * Scott Pascual MD - Primary    Pre-op Diagnosis:  Primary hypertension [I10]  CKD (chronic kidney disease) stage 4, GFR 15-29 ml/min [N18.4]  ESRD (end stage renal disease) [N18.6]    Post-op Diagnosis: Post-Op Diagnosis Codes:     * Primary hypertension [I10]     * CKD (chronic kidney disease) stage 4, GFR 15-29 ml/min [N18.4]     * ESRD (end stage renal disease) [N18.6]    Procedure(s) (LRB):  INSERTION, GRAFT, ARTERIOVENOUS (Left)    Anesthesia: General/Regional    Procedure in Detail/Findings:    After satisfactory regional block, left  upper arm,   forearm prepped and draped in normal sterile manner using ChloraPrep.    Stockinette was applied.  An incision was made in the upper medial aspect of the   left upper arm.  Incision was carried down to the deep subcutaneous tissues.    Subcutaneous bleeders clamped and bovied, further taken down.  Brachial vein was   isolated.  Proximal and distal control was obtained.  Laterally, brachial   artery was isolated.  Proximal and  distal control was obtained.  Another   incision was made in the distal right arm.  A tunnel was created between the two   incisions.  [place] 6 mm size was placed into the tunnel.  First graft-to-vein   anastomosis was created on the venous side using running 6-0 Prolene suture.    Then graft-to-artery anastomosis was created on the arterial side using running   6-0 Prolene suture.  The patient had good bruit after completion of procedure.    Hemostasis was satisfactorily maintained.  Wound was thoroughly irrigated with   antibiotic solution and then approximated using 3-0 Vicryl for subcutaneous   tissue.  Skin was closed using skin staple.  Sterile gauze dressing was applied.    Instrument count, sponge count, and needle count was correct.  The patient   tolerated it well.  Estimated blood loss was 30 mL.  No specimen was removed.    No intraoperative complications.    Estimated Blood Loss: 30 cc         Specimens (From admission, onward)      None          Implants:   Implant Name Type Inv. Item Serial No.  Lot No. LRB No. Used Action   [place] propaten vascular graft 6x45   [id number] GORE ([employer])  Left 1 Implanted              Disposition: PACU - hemodynamically stable.           Condition: Good    Attestation:  I performed the procedure.           Discharge Note    Admit Date: 4/15/2024    Attending Physician: Scott Pascual MD     Discharge Physician: Scott Pascual MD    Final Diagnosis: Post-Op Diagnosis Codes:     * Primary hypertension [I10]     * CKD (chronic kidney disease) stage 4, GFR 15-29 ml/min [N18.4]     * ESRD (end stage renal disease) [N18.6]    Disposition: Home or Self Care    Patient Instructions:   Current Discharge Medication List        START taking these medications    Details   !! HYDROcodone-acetaminophen (NORCO) 5-325 mg per tablet Take 1 tablet by mouth every 6 (six) hours as needed for Pain.  Qty: 24 tablet, Refills: 0    Comments: Quantity prescribed  more than 7 day supply? Yes, quantity medically necessary       !! - Potential duplicate medications found. Please discuss with provider.        CONTINUE these medications which have NOT CHANGED    Details   amLODIPine (NORVASC) 5 MG tablet Take 1 tablet (5 mg total) by mouth once daily.  Qty: 90 tablet, Refills: 3    Comments: .  Associated Diagnoses: Primary hypertension      apixaban (ELIQUIS) 5 mg Tab Take 1 tablet (5 mg total) by mouth 2 (two) times daily.  Qty: 180 tablet, Refills: 3    Associated Diagnoses: Persistent atrial fibrillation      carvediloL (COREG) 25 MG tablet Take 1 tablet (25 mg total) by mouth 2 (two) times daily with meals.  Qty: 180 tablet, Refills: 3    Comments: .  Associated Diagnoses: Persistent atrial fibrillation; Primary hypertension      clopidogreL (PLAVIX) 75 mg tablet Take 1 tablet (75 mg total) by mouth once daily.  Qty: 90 tablet, Refills: 3    Associated Diagnoses: Atherosclerosis of native coronary artery of native heart without angina pectoris      coenzyme Q10 100 mg capsule Take 100 mg by mouth once daily.      eplerenone (INSPRA) 50 MG Tab Take 1 tablet (50 mg total) by mouth once daily.  Qty: 90 tablet, Refills: 3    Associated Diagnoses: Essential hypertension      hydrALAZINE (APRESOLINE) 50 MG tablet Take 1 tablet (50 mg total) by mouth every 8 (eight) hours.  Qty: 270 tablet, Refills: 3    Comments: .  Associated Diagnoses: Essential hypertension      hydrOXYzine pamoate (VISTARIL) 25 MG Cap Take 1 capsule (25 mg total) by mouth nightly as needed (Insomnia/Anxiety).  Qty: 30 capsule, Refills: 0      isosorbide mononitrate (IMDUR) 30 MG 24 hr tablet Take 1 tablet (30 mg total) by mouth once daily.  Qty: 90 tablet, Refills: 3    Associated Diagnoses: Atherosclerosis of native coronary artery of native heart without angina pectoris; Chest pain, unspecified type      levothyroxine (SYNTHROID) 75 MCG tablet Take 1 tablet (75 mcg total) by mouth before breakfast.  Qty: 30  tablet, Refills: 11      ranolazine (RANEXA) 500 MG Tb12 Take 1 tablet (500 mg total) by mouth 2 (two) times daily.  Qty: 180 tablet, Refills: 3    Associated Diagnoses: Atherosclerosis of native coronary artery of native heart without angina pectoris; Chest pain, unspecified type      rosuvastatin (CRESTOR) 40 MG Tab Take 1 tablet (40 mg total) by mouth every evening.  Qty: 90 tablet, Refills: 3    Associated Diagnoses: Mixed hyperlipidemia      albuterol (PROVENTIL/VENTOLIN HFA) 90 mcg/actuation inhaler INHALE 2 PUFFS INTO THE LUNGS EVERY 6 HOURS AS NEEDED FOR WHEEZING  Qty: 54 g, Refills: 3      albuterol-ipratropium (DUO-NEB) 2.5 mg-0.5 mg/3 mL nebulizer solution Take 3 mLs by nebulization every 6 (six) hours as needed for Wheezing or Shortness of Breath (or cough). Rescue  Qty: 75 mL, Refills: 3    Associated Diagnoses: COPD exacerbation      epoetin robi-epbx (RETACRIT) 40,000 unit/mL injection Inject 0.1 mLs (4,000 Units total) into the skin every 30 days.  Qty: 0.1 mL, Refills: 0    Associated Diagnoses: Anemia due to stage 4 chronic kidney disease      !! HYDROcodone-acetaminophen (NORCO) 5-325 mg per tablet Take 1 tablet by mouth every 8 (eight) hours as needed for Pain.  Qty: 21 tablet, Refills: 0    Comments: Quantity prescribed more than 7 day supply? Yes, quantity medically necessary      LIDOcaine (LIDODERM) 5 % Place 1 patch onto the skin daily as needed (back pain). Remove & Discard patch within 12 hours or as directed by MD  Qty: 30 patch, Refills: 0      nitroGLYCERIN (NITROSTAT) 0.4 MG SL tablet Place 1 tablet (0.4 mg total) under the tongue every 5 (five) minutes as needed for Chest pain.  Qty: 90 tablet, Refills: 3    Associated Diagnoses: Atherosclerosis of native coronary artery of native heart without angina pectoris; Chest pain, unspecified type      sodium bicarbonate 650 MG tablet Take 1 tablet (650 mg total) by mouth once daily. for 30 doses  Qty: 30 tablet, Refills: 3       !! -  Potential duplicate medications found. Please discuss with provider.          Discharge Procedure Orders (must include Diet, Follow-up, Activity)   Discharge Procedure Orders (must include Diet, Follow-up, Activity)   Diet general     Keep surgical extremity elevated     Lifting restrictions     Leave dressing on - Keep it clean, dry, and intact until clinic visit     Call MD for:  temperature >100.4     Call MD for:  persistent nausea and vomiting     Call MD for:  severe uncontrolled pain     Call MD for:  redness, tenderness, or signs of infection (pain, swelling, redness, odor or green/yellow discharge around incision site)        Discharge Date: No discharge date for patient encounter.      Significant Surgical Tasks Conducted by the Assistant(s), if Applicable: na    Estimated Blood Loss (EBL):  30 cc         Implants:   Implant Name Type Inv. Item Serial No.  Lot No. LRB No. Used Action   [place] propaten vascular graft 6x45   [id number] GORE ([employer])  Left 1 Implanted       Specimens:   Specimen (24h ago, onward)      None                    Condition: Good    Disposition: PACU - hemodynamically stable.    Attestation: I performed the procedure.    Discharge Note    OUTCOME: Patient tolerated treatment/procedure well without complication and is now ready for discharge.    DISPOSITION: Home or Self Care    FINAL DIAGNOSIS:  CKD (chronic kidney disease) stage 4, GFR 15-29 ml/min    FOLLOWUP: In clinic 7 days.    DISCHARGE INSTRUCTIONS:    Discharge Procedure Orders   Diet general     Keep surgical extremity elevated     Lifting restrictions     Leave dressing on - Keep it clean, dry, and intact until clinic visit     Call MD for:  temperature >100.4     Call MD for:  persistent nausea and vomiting     Call MD for:  severe uncontrolled pain     Call MD for:  redness, tenderness, or signs of infection (pain, swelling, redness, odor or green/yellow discharge around incision site)

## 2024-04-15 NOTE — ANESTHESIA PREPROCEDURE EVALUATION
[facility]  Anesthesia Pre-Operative Evaluation        04/15/2024    Domingo Gross is a 63 y.o. male w/ a significant PMHx as below who     TTE 6/2023  The left ventricle is normal in size with normal systolic function.  The estimated ejection fraction is 55%.  Normal right ventricular size with normal right ventricular systolic function.  Severe left atrial enlargement.  Mild mitral regurgitation.  The estimated PA systolic pressure is 38 mmHg.  Normal central venous pressure (3 mmHg).  A diastolic pattern consistent with atrial fibrillation observed.      Patient Active Problem List   Diagnosis    HTN (hypertension)    Claudication in peripheral vascular disease    DJD (degenerative joint disease), lumbar    DDD (degenerative disc disease)    Hyperlipidemia    Atherosclerosis of native artery of both lower extremities with intermittent claudication    Venous insufficiency of both lower extremities    Abnormal cardiovascular stress test    Coronary artery disease involving native coronary artery of native heart with angina pectoris with documented spasm    Bilateral carotid artery stenosis    Cardiac arrest    BRETT (acute kidney injury)    Nephrotic range proteinuria    Nuclear sclerosis, bilateral    CKD (chronic kidney disease) stage 4, GFR 15-29 ml/min    COPD (chronic obstructive pulmonary disease)    Anemia due to stage 4 chronic kidney disease    Persistent atrial fibrillation    Hypothyroidism    Unstable angina    Paroxysmal atrial fibrillation    Acute liver failure without hepatic coma    CAP (community acquired pneumonia)    EBV infection    CMV (cytomegalovirus infection)    Hyponatremia    Uremia    Closed fracture of transverse process of lumbar vertebra    ABLA (acute blood loss anemia)    ESRD (end stage renal disease)    Fall    Membranous nephropathy determined by biopsy       Review of patient's allergies indicates:   Allergen Reactions    Ace inhibitors Rash        Current Inpatient Medications:  Current Facility-Administered Medications   Medication Dose Route Frequency Provider Last Rate Last Admin    0.9%  NaCl infusion   Intravenous Continuous Scott Pascual MD        mupirocin 2 % ointment   Nasal On Call Procedure Scott Pascual MD         Facility-Administered Medications Ordered in Other Encounters   Medication Dose Route Frequency Provider Last Rate Last Admin    sodium chloride 0.9% infusion   Intravenous Continuous PRN Dimitrios Cervantese L, CRNA   New Bag at 11/14/22 1033       Current Facility-Administered Medications on File Prior to Encounter   Medication Dose Route Frequency Provider Last Rate Last Admin    sodium chloride 0.9% infusion   Intravenous Continuous PRN Ittmann, Bushra L, CRNA   New Bag at 11/14/22 1033     Current Outpatient Medications on File Prior to Encounter   Medication Sig Dispense Refill    apixaban (ELIQUIS) 5 mg Tab Take 1 tablet (5 mg total) by mouth 2 (two) times daily. 180 tablet 3    clopidogreL (PLAVIX) 75 mg tablet Take 1 tablet (75 mg total) by mouth once daily. 90 tablet 3    albuterol (PROVENTIL/VENTOLIN HFA) 90 mcg/actuation inhaler INHALE 2 PUFFS INTO THE LUNGS EVERY 6 HOURS AS NEEDED FOR WHEEZING 54 g 3    albuterol-ipratropium (DUO-NEB) 2.5 mg-0.5 mg/3 mL nebulizer solution Take 3 mLs by nebulization every 6 (six) hours as needed for Wheezing or Shortness of Breath (or cough). Rescue 75 mL 3    amLODIPine (NORVASC) 5 MG tablet Take 1 tablet (5 mg total) by mouth once daily. 90 tablet 3    carvediloL (COREG) 25 MG tablet Take 1 tablet (25 mg total) by mouth 2 (two) times daily with meals. 180 tablet 3    coenzyme Q10 100 mg capsule Take 100 mg by mouth once daily.      eplerenone (INSPRA) 50 MG Tab Take 1 tablet (50 mg total) by mouth once daily. 90 tablet 3    epoetin robi-epbx (RETACRIT) 40,000 unit/mL injection Inject 0.1 mLs (4,000 Units total) into the skin every 30 days. 0.1 mL 0    hydrALAZINE (APRESOLINE)  50 MG tablet Take 1 tablet (50 mg total) by mouth every 8 (eight) hours. 270 tablet 3    HYDROcodone-acetaminophen (NORCO) 5-325 mg per tablet Take 1 tablet by mouth every 8 (eight) hours as needed for Pain. 21 tablet 0    hydrOXYzine pamoate (VISTARIL) 25 MG Cap Take 1 capsule (25 mg total) by mouth nightly as needed (Insomnia/Anxiety). 30 capsule 0    isosorbide mononitrate (IMDUR) 30 MG 24 hr tablet Take 1 tablet (30 mg total) by mouth once daily. 90 tablet 3    levothyroxine (SYNTHROID) 75 MCG tablet Take 1 tablet (75 mcg total) by mouth before breakfast. 30 tablet 11    LIDOcaine (LIDODERM) 5 % Place 1 patch onto the skin daily as needed (back pain). Remove & Discard patch within 12 hours or as directed by MD 30 patch 0    nitroGLYCERIN (NITROSTAT) 0.4 MG SL tablet Place 1 tablet (0.4 mg total) under the tongue every 5 (five) minutes as needed for Chest pain. 90 tablet 3    ranolazine (RANEXA) 500 MG Tb12 Take 1 tablet (500 mg total) by mouth 2 (two) times daily. 180 tablet 3    rosuvastatin (CRESTOR) 40 MG Tab Take 1 tablet (40 mg total) by mouth every evening. 90 tablet 3    sodium bicarbonate 650 MG tablet Take 1 tablet (650 mg total) by mouth once daily. for 30 doses 30 tablet 3       Past Surgical History:   Procedure Laterality Date    ABDOMINAL AORTOGRAPHY N/A 5/10/2019    Procedure: AORTOGRAM-ABDOMINAL;  Surgeon: Keo Jenkins MD;  Location: [facility] CATH LAB/EP;  Service: Cardiology;  Laterality: N/A;  RSFA intervention     AORTOGRAPHY WITH SERIALOGRAPHY N/A 12/3/2021    Procedure: AORTOGRAM, WITH SERIALOGRAPHY;  Surgeon: Keo Jenkins MD;  Location: [facility] CATH LAB/EP;  Service: Cardiology;  Laterality: N/A;    AORTOGRAPHY WITH SERIALOGRAPHY N/A 4/1/2022    Procedure: AORTOGRAM, WITH SERIALOGRAPHY;  Surgeon: Keo Jenkins MD;  Location: [facility] CATH LAB/EP;  Service: Cardiology;  Laterality: N/A;    ATHERECTOMY, CORONARY N/A 4/25/2023    Procedure: Atherectomy-coronary;  Surgeon: Keo Jenkins MD;   Location: [facility] CATH LAB/EP;  Service: Cardiology;  Laterality: N/A;    BONE MARROW BIOPSY Right 10/12/2021    Procedure: BIOPSY-BONE MARROW;  Surgeon: Naseem Woods MD;  Location: [facility] OR;  Service: Oncology;  Laterality: Right;    CARDIAC CATHETERIZATION      CATARACT EXTRACTION      CATARACT EXTRACTION W/  INTRAOCULAR LENS IMPLANT Right 6/8/2020    Procedure: EXTRACTION, CATARACT, WITH IOL INSERTION;  Surgeon: Tin Light MD;  Location: [facility] OR;  Service: Ophthalmology;  Laterality: Right;    CATARACT EXTRACTION W/  INTRAOCULAR LENS IMPLANT Left 7/2/2020    Procedure: EXTRACTION, CATARACT, WITH IOL INSERTION;  Surgeon: Tin Light MD;  Location: [facility] OR;  Service: Ophthalmology;  Laterality: Left;    COLONOSCOPY N/A 1/6/2022    Procedure: COLONOSCOPY;  Surgeon: Kathe Penaloza MD;  Location: [facility] (2ND FLR);  Service: Endoscopy;  Laterality: N/A;  COVID test at [facility] on 1/3-GT  okay to hold Plavix for 5 days and aspirin per Dr. Becerra  2nd floor due toextensive cardiac history   instructions mailed and my [facility] portal -     CORONARY ANGIOGRAPHY N/A 3/29/2019    Procedure: ANGIOGRAM, CORONARY ARTERY;  Surgeon: Keo Jenkins MD;  Location: [facility] CATH LAB/EP;  Service: Cardiology;  Laterality: N/A;    CORONARY ANGIOGRAPHY Left 10/11/2019    Procedure: ANGIOGRAM, CORONARY ARTERY;  Surgeon: Keo Jenkins MD;  Location: [facility] CATH LAB/EP;  Service: Cardiology;  Laterality: Left;    CORONARY ANGIOGRAPHY N/A 4/10/2023    Procedure: ANGIOGRAM, CORONARY ARTERY;  Surgeon: Keo Jenkins MD;  Location: [facility] CATH LAB/EP;  Service: Cardiology;  Laterality: N/A;    CORONARY ANGIOPLASTY WITH STENT PLACEMENT  03/29/2019    mid and distal RCA    ESOPHAGOGASTRODUODENOSCOPY N/A 1/6/2022    Procedure: EGD (ESOPHAGOGASTRODUODENOSCOPY);  Surgeon: Kathe Penaloza MD;  Location: [facility] (2ND FLR);  Service: Endoscopy;  Laterality: N/A;    EYE SURGERY      INSERTION OF TUNNELED CENTRAL VENOUS HEMODIALYSIS CATHETER  N/A 2/9/2024    Procedure: INSERTION, CATHETER, HEMODIALYSIS, DUAL LUMEN;  Surgeon: Wang Mendieta MD;  Location: [facility] OR;  Service: General;  Laterality: N/A;    INSTANTANEOUS WAVE-FREE RATIO (IFR) N/A 4/25/2023    Procedure: Instantaneous Wave-Free Ratio (IFR);  Surgeon: Keo Jenkins MD;  Location: [facility] CATH LAB/EP;  Service: Cardiology;  Laterality: N/A;    INTRAVASCULAR ULTRASOUND, NON-CORONARY  8/12/2022    Procedure: Intravascular Ultrasound, Non-Coronary;  Surgeon: Keo Jenkins MD;  Location: [facility] CATH LAB/EP;  Service: Cardiology;;    IVUS, CORONARY  4/25/2023    Procedure: IVUS, Coronary;  Surgeon: Keo Jenkins MD;  Location: [facility] CATH LAB/EP;  Service: Cardiology;;    IVUS, CORONARY  5/16/2023    Procedure: IVUS, Coronary;  Surgeon: Keo Jenkins MD;  Location: [facility] CATH LAB/EP;  Service: Cardiology;;  CX    LEFT HEART CATHETERIZATION N/A 3/29/2019    Procedure: Left heart cath;  Surgeon: Keo Jenkins MD;  Location: [facility] CATH LAB/EP;  Service: Cardiology;  Laterality: N/A;    LEFT HEART CATHETERIZATION Left 10/8/2019    Procedure: Left heart cath;  Surgeon: Keo Jenkins MD;  Location: [facility] CATH LAB/EP;  Service: Cardiology;  Laterality: Left;    LEFT HEART CATHETERIZATION Left 4/10/2023    Procedure: Left heart cath;  Surgeon: Keo Jenkins MD;  Location: [facility] CATH LAB/EP;  Service: Cardiology;  Laterality: Left;    LEFT HEART CATHETERIZATION Left 4/25/2023    Procedure: Left heart cath;  Surgeon: Keo Jenkins MD;  Location: [facility] CATH LAB/EP;  Service: Cardiology;  Laterality: Left;    LEFT HEART CATHETERIZATION Left 5/16/2023    Procedure: Left heart cath;  Surgeon: Keo Jenkins MD;  Location: [facility] CATH LAB/EP;  Service: Cardiology;  Laterality: Left;    PERCUTANEOUS TRANSLUMINAL ANGIOPLASTY (PTA) OF PERIPHERAL VESSEL N/A 7/12/2019    Procedure: PTA, PERIPHERAL VESSEL;  Surgeon: Keo Jenkins MD;  Location: [facility] CATH LAB/EP;  Service: Cardiology;  Laterality: N/A;     PERCUTANEOUS TRANSLUMINAL ANGIOPLASTY (PTA) OF PERIPHERAL VESSEL Left 2022    Procedure: PTA, PERIPHERAL VESSEL;  Surgeon: Keo Jenkins MD;  Location: [facility] CATH LAB/EP;  Service: Cardiology;  Laterality: Left;    PERCUTANEOUS TRANSLUMINAL ANGIOPLASTY (PTA) OF PERIPHERAL VESSEL Right 2022    Procedure: PTA, PERIPHERAL VESSEL;  Surgeon: Keo Jenkins MD;  Location: [facility] CATH LAB/EP;  Service: Cardiology;  Laterality: Right;    PERCUTANEOUS TRANSLUMINAL BALLOON ANGIOPLASTY OF CORONARY ARTERY  2023    Procedure: Angioplasty-coronary;  Surgeon: Keo Jenkins MD;  Location: [facility] CATH LAB/EP;  Service: Cardiology;;    PTCA, SINGLE VESSEL  2023    Procedure: PTCA, Single Vessel;  Surgeon: Keo Jenkins MD;  Location: [facility] CATH LAB/EP;  Service: Cardiology;;  CX    REMOVAL OF HEMODIALYSIS CATHETER Right 2024    Procedure: REMOVAL, CATHETER, HEMODIALYSIS;  Surgeon: Wang Mendieta MD;  Location: [facility] OR;  Service: General;  Laterality: Right;    STENT, DRUG ELUTING, SINGLE VESSEL, CORONARY  2023    Procedure: Stent, Drug Eluting, Single Vessel, Coronary;  Surgeon: Keo Jenkins MD;  Location: [facility] CATH LAB/EP;  Service: Cardiology;;    STENT, DRUG ELUTING, SINGLE VESSEL, CORONARY  2023    Procedure: Stent, Drug Eluting, Single Vessel, Coronary;  Surgeon: Keo Jenkins MD;  Location: [facility] CATH LAB/EP;  Service: Cardiology;;  CX    TRANSESOPHAGEAL ECHOCARDIOGRAM WITH POSSIBLE CARDIOVERSION (JOSE W/ POSS CARDIOVERSION) N/A 2023    Procedure: Transesophageal echo (JOSE) intra-procedure log documentation;  Surgeon: Keo Jenkins MD;  Location: [facility] CATH LAB/EP;  Service: Cardiology;  Laterality: N/A;       Social History     Socioeconomic History    Marital status:    Occupational History     Employer: [employer]   Tobacco Use    Smoking status: Former     Current packs/day: 0.00     Types: Cigarettes     Quit date: 1999     Years since quittin.0     "Smokeless tobacco: Never   Substance and Sexual Activity    Alcohol use: No     Alcohol/week: 0.0 standard drinks of alcohol    Drug use: No    Sexual activity: Yes     Partners: Female     Social Determinants of Health     Financial Resource Strain: Medium Risk (3/2/2024)    Overall Financial Resource Strain (CARDIA)     Difficulty of Paying Living Expenses: Somewhat hard   Food Insecurity: Food Insecurity Present (3/2/2024)    Hunger Vital Sign     Worried About Running Out of Food in the Last Year: Sometimes true     Ran Out of Food in the Last Year: Sometimes true   Transportation Needs: No Transportation Needs (3/2/2024)    PRAPARE - Transportation     Lack of Transportation (Medical): No     Lack of Transportation (Non-Medical): No   Physical Activity: Insufficiently Active (3/2/2024)    Exercise Vital Sign     Days of Exercise per Week: 4 days     Minutes of Exercise per Session: 10 min   Stress: No Stress Concern Present (3/2/2024)    [employer] [place] [employer] - Occupational Stress Questionnaire     Feeling of Stress : Not at all   Social Connections: Unknown (3/2/2024)    Social Connection and Isolation Panel [NHANES]     Frequency of Communication with Friends and Family: More than three times a week     Frequency of Social Gatherings with Friends and Family: Never     Active Member of Clubs or Organizations: No     Attends Club or Organization Meetings: Never     Marital Status:    Housing Stability: High Risk (3/2/2024)    Housing Stability Vital Sign     Unable to Pay for Housing in the Last Year: No     Unstable Housing in the Last Year: Yes       OBJECTIVE:     Vital Signs Range (Last 24H):         CBC:   No results for input(s): "WBC", "RBC", "HGB", "HCT", "PLT", "MCV", "MCH", "MCHC" in the last 72 hours.      CMP:   No results for input(s): "NA", "K", "CL", "CO2", "BUN", "CREATININE", "GLU", "MG", "PHOS", "CALCIUM", "ALBUMIN", "PROT", "ALKPHOS", "ALT", "AST", "BILITOT" in the " "last 72 hours.      INR:  No results for input(s): "PT", "INR", "PROTIME", "APTT" in the last 72 hours.      Diagnostic Studies: No relevant studies.    EKG: No recent studies available.    2D ECHO:  No results found. However, due to the size of the patient record, not all encounters were searched. Please check Results Review for a complete set of results.      ASSESSMENT/PLAN:           Pre-op Assessment    I have reviewed the Patient Summary Reports.    I have reviewed the NPO Status.   I have reviewed the Medications.     Review of Systems  Anesthesia Hx:  No problems with previous Anesthesia                Cardiovascular:     Hypertension  Past MI CAD                                        Pulmonary:   COPD                     Renal/:  Chronic Renal Disease                Hepatic/GI:      Liver Disease,            Musculoskeletal:  Arthritis               Neurological:    Neuromuscular Disease,                                   Endocrine:   Hypothyroidism              Physical Exam  General: Alert and Cooperative    Airway:  Mallampati: III / II  Mouth Opening: Normal  TM Distance: Normal  Tongue: Normal  Neck ROM: Normal ROM    Chest/Lungs:  Normal Respiratory Rate    Heart:  Rate: Normal        Anesthesia Plan  Type of Anesthesia, risks & benefits discussed:    Anesthesia Type: Gen Natural Airway  Intra-op Monitoring Plan: Standard ASA Monitors  Post Op Pain Control Plan: multimodal analgesia  Induction:  IV  Airway Plan: Direct, Post-Induction  Informed Consent: Informed consent signed with the Patient and all parties understand the risks and agree with anesthesia plan.  All questions answered.   ASA Score: 3  Day of Surgery Review of History & Physical: H&P Update referred to the surgeon/provider.    Ready For Surgery From Anesthesia Perspective.     .      "

## 2024-04-15 NOTE — PLAN OF CARE
Received from [facility] secondary to sedation post OR.  Monitors and O2 applied.  SANTOSH Wray CRNA, Dr Humphries at bedside.

## 2024-04-15 NOTE — ANESTHESIA POSTPROCEDURE EVALUATION
Anesthesia Post Evaluation    Patient: Domingo Gross    Procedure(s) Performed: Procedure(s) (LRB):  INSERTION, GRAFT, ARTERIOVENOUS (Left)    Final Anesthesia Type: general (with regional)      Patient location during evaluation: PACU  Patient participation: Yes- Able to Participate  Level of consciousness: awake  Post-procedure vital signs: reviewed and stable  Pain management: adequate  Airway patency: patent    PONV status at discharge: No PONV  Anesthetic complications: no      Cardiovascular status: blood pressure returned to baseline  Respiratory status: unassisted  Hydration status: euvolemic  Follow-up not needed.  Comments:   Somnolent at end of case and recovered in PACU.  Alerted when he returned to outpatient.  Denied pain.               Vitals Value Taken Time   /61 04/15/24 1700   Temp 36.7 °C (98 °F) 04/15/24 1700   Pulse 59 04/15/24 1700   Resp 17 04/15/24 1700   SpO2 98 % 04/15/24 1700         Event Time   Out of Recovery 16:05:20         Pain/Edmundo Score: Edmundo Score: 10 (4/15/2024  5:00 PM)

## 2024-04-15 NOTE — H&P
History & Physical    SUBJECTIVE:     History of Present Illness:  Patient is a 63 y.o. male presents for the assessment of perm access for dialysis . Patient currently being dialyzed by [facility] . No active complaints.       No chief complaint on file.      Review of patient's allergies indicates:   Allergen Reactions    Ace inhibitors Rash       Current Facility-Administered Medications   Medication Dose Route Frequency Provider Last Rate Last Admin    0.9%  NaCl infusion   Intravenous Continuous Scott Pascual MD        mupirocin 2 % ointment   Nasal On Call Procedure Scott Pascual MD         Facility-Administered Medications Ordered in Other Encounters   Medication Dose Route Frequency Provider Last Rate Last Admin    sodium chloride 0.9% infusion   Intravenous Continuous PRN Bushra Cervantes CRNA   New Bag at 11/14/22 1033       Past Medical History:   Diagnosis Date    Allergy     Anemia     Anticoagulant long-term use     Arthritis     CKD (chronic kidney disease) stage 4, GFR 15-29 ml/min     COPD (chronic obstructive pulmonary disease) 08/20/2021    Coronary artery disease     Heart attack 10/04/2019    Hematuria     Hemothorax     Hyperlipidemia     Hypertension     Hyperuricemia     Hypocalcemia     Hypokalemia     Hypophosphatemia     Hypothyroidism 3/2/2023    PAD (peripheral artery disease)     Proteinuria     Vitamin D deficiency      Past Surgical History:   Procedure Laterality Date    ABDOMINAL AORTOGRAPHY N/A 5/10/2019    Procedure: AORTOGRAM-ABDOMINAL;  Surgeon: Keo Jenkins MD;  Location: [facility] CATH LAB/EP;  Service: Cardiology;  Laterality: N/A;  RSFA intervention     AORTOGRAPHY WITH SERIALOGRAPHY N/A 12/3/2021    Procedure: AORTOGRAM, WITH SERIALOGRAPHY;  Surgeon: Keo Jenkins MD;  Location: [facility] CATH LAB/EP;  Service: Cardiology;  Laterality: N/A;    AORTOGRAPHY WITH SERIALOGRAPHY N/A 4/1/2022    Procedure: AORTOGRAM, WITH SERIALOGRAPHY;  Surgeon: Keo Jenkins MD;   Location: [facility] CATH LAB/EP;  Service: Cardiology;  Laterality: N/A;    ATHERECTOMY, CORONARY N/A 4/25/2023    Procedure: Atherectomy-coronary;  Surgeon: Keo Jenkins MD;  Location: [facility] CATH LAB/EP;  Service: Cardiology;  Laterality: N/A;    BONE MARROW BIOPSY Right 10/12/2021    Procedure: BIOPSY-BONE MARROW;  Surgeon: Naseem Woods MD;  Location: [facility] OR;  Service: Oncology;  Laterality: Right;    CARDIAC CATHETERIZATION      CATARACT EXTRACTION      CATARACT EXTRACTION W/  INTRAOCULAR LENS IMPLANT Right 6/8/2020    Procedure: EXTRACTION, CATARACT, WITH IOL INSERTION;  Surgeon: Tin Light MD;  Location: [facility] OR;  Service: Ophthalmology;  Laterality: Right;    CATARACT EXTRACTION W/  INTRAOCULAR LENS IMPLANT Left 7/2/2020    Procedure: EXTRACTION, CATARACT, WITH IOL INSERTION;  Surgeon: Tin Light MD;  Location: [facility] OR;  Service: Ophthalmology;  Laterality: Left;    COLONOSCOPY N/A 1/6/2022    Procedure: COLONOSCOPY;  Surgeon: Kathe Penaloza MD;  Location: [facility] (2ND FLR);  Service: Endoscopy;  Laterality: N/A;  COVID test at [facility] on 1/3-GT  okay to hold Plavix for 5 days and aspirin per Dr. Becerra  2nd floor due toextensive cardiac history   instructions mailed and my [facility] portal -     CORONARY ANGIOGRAPHY N/A 3/29/2019    Procedure: ANGIOGRAM, CORONARY ARTERY;  Surgeon: Keo Jenkins MD;  Location: [facility] CATH LAB/EP;  Service: Cardiology;  Laterality: N/A;    CORONARY ANGIOGRAPHY Left 10/11/2019    Procedure: ANGIOGRAM, CORONARY ARTERY;  Surgeon: Keo Jenkins MD;  Location: [facility] CATH LAB/EP;  Service: Cardiology;  Laterality: Left;    CORONARY ANGIOGRAPHY N/A 4/10/2023    Procedure: ANGIOGRAM, CORONARY ARTERY;  Surgeon: Keo Jenkins MD;  Location: [facility] CATH LAB/EP;  Service: Cardiology;  Laterality: N/A;    CORONARY ANGIOPLASTY WITH STENT PLACEMENT  03/29/2019    mid and distal RCA    ESOPHAGOGASTRODUODENOSCOPY N/A 1/6/2022    Procedure: EGD (ESOPHAGOGASTRODUODENOSCOPY);   Surgeon: Kateh Penaloza MD;  [facility]  Service: Endoscopy;  Laterality: N/A;    EYE SURGERY      INSERTION OF TUNNELED CENTRAL VENOUS HEMODIALYSIS CATHETER N/A 2/9/2024    Procedure: INSERTION, CATHETER, HEMODIALYSIS, DUAL LUMEN;  Surgeon: Wang Mendieta MD;  Location: [facility] OR;  Service: General;  Laterality: N/A;    INSTANTANEOUS WAVE-FREE RATIO (IFR) N/A 4/25/2023    Procedure: Instantaneous Wave-Free Ratio (IFR);  Surgeon: Keo Jenkins MD;  Location: [facility] CATH LAB/EP;  Service: Cardiology;  Laterality: N/A;    INTRAVASCULAR ULTRASOUND, NON-CORONARY  8/12/2022    Procedure: Intravascular Ultrasound, Non-Coronary;  Surgeon: Keo Jenkins MD;  Location: [facility] CATH LAB/EP;  Service: Cardiology;;    IVUS, CORONARY  4/25/2023    Procedure: IVUS, Coronary;  Surgeon: Keo Jenkins MD;  Location: [facility] CATH LAB/EP;  Service: Cardiology;;    IVUS, CORONARY  5/16/2023    Procedure: IVUS, Coronary;  Surgeon: Keo Jenkins MD;  Location: [facility] CATH LAB/EP;  Service: Cardiology;;  CX    LEFT HEART CATHETERIZATION N/A 3/29/2019    Procedure: Left heart cath;  Surgeon: Keo Jenkins MD;  Location: [facility] CATH LAB/EP;  Service: Cardiology;  Laterality: N/A;    LEFT HEART CATHETERIZATION Left 10/8/2019    Procedure: Left heart cath;  Surgeon: Keo Jenkins MD;  Location: [facility] CATH LAB/EP;  Service: Cardiology;  Laterality: Left;    LEFT HEART CATHETERIZATION Left 4/10/2023    Procedure: Left heart cath;  Surgeon: Keo Jenkins MD;  Location: [facility] CATH LAB/EP;  Service: Cardiology;  Laterality: Left;    LEFT HEART CATHETERIZATION Left 4/25/2023    Procedure: Left heart cath;  Surgeon: Keo Jenkins MD;  Location: [facility] CATH LAB/EP;  Service: Cardiology;  Laterality: Left;    LEFT HEART CATHETERIZATION Left 5/16/2023    Procedure: Left heart cath;  Surgeon: Keo Jenkins MD;  Location: [facility] CATH LAB/EP;  Service: Cardiology;  Laterality: Left;    PERCUTANEOUS TRANSLUMINAL ANGIOPLASTY (PTA) OF  PERIPHERAL VESSEL N/A 7/12/2019    Procedure: PTA, PERIPHERAL VESSEL;  Surgeon: Keo Jenkins MD;  Location: [facility] CATH LAB/EP;  Service: Cardiology;  Laterality: N/A;    PERCUTANEOUS TRANSLUMINAL ANGIOPLASTY (PTA) OF PERIPHERAL VESSEL Left 5/20/2022    Procedure: PTA, PERIPHERAL VESSEL;  Surgeon: Keo Jenkins MD;  Location: [facility] CATH LAB/EP;  Service: Cardiology;  Laterality: Left;    PERCUTANEOUS TRANSLUMINAL ANGIOPLASTY (PTA) OF PERIPHERAL VESSEL Right 8/12/2022    Procedure: PTA, PERIPHERAL VESSEL;  Surgeon: Keo Jenkins MD;  Location: [facility] CATH LAB/EP;  Service: Cardiology;  Laterality: Right;    PERCUTANEOUS TRANSLUMINAL BALLOON ANGIOPLASTY OF CORONARY ARTERY  4/25/2023    Procedure: Angioplasty-coronary;  Surgeon: Keo Jenkins MD;  Location: [facility] CATH LAB/EP;  Service: Cardiology;;    PTCA, SINGLE VESSEL  5/16/2023    Procedure: PTCA, Single Vessel;  Surgeon: Keo Jenkins MD;  Location: [facility] CATH LAB/EP;  Service: Cardiology;;  CX    REMOVAL OF HEMODIALYSIS CATHETER Right 2/9/2024    Procedure: REMOVAL, CATHETER, HEMODIALYSIS;  Surgeon: Wang Mendieta MD;  Location: [facility] OR;  Service: General;  Laterality: Right;    STENT, DRUG ELUTING, SINGLE VESSEL, CORONARY  4/25/2023    Procedure: Stent, Drug Eluting, Single Vessel, Coronary;  Surgeon: Keo Jenkins MD;  Location: [facility] CATH LAB/EP;  Service: Cardiology;;    STENT, DRUG ELUTING, SINGLE VESSEL, CORONARY  5/16/2023    Procedure: Stent, Drug Eluting, Single Vessel, Coronary;  Surgeon: Keo Jenkins MD;  Location: [facility] CATH LAB/EP;  Service: Cardiology;;  CX    TRANSESOPHAGEAL ECHOCARDIOGRAM WITH POSSIBLE CARDIOVERSION (JOSE W/ POSS CARDIOVERSION) N/A 6/19/2023    Procedure: Transesophageal echo (JOSE) intra-procedure log documentation;  Surgeon: Keo Jenkins MD;  Location: [facility] CATH LAB/EP;  Service: Cardiology;  Laterality: N/A;     Family History   Problem Relation Name Age of Onset    Aneurysm Mother      Hypertension Mother      Heart  disease Mother      Cancer Father      Diabetes Sister 1     Hypertension Sister 1     No Known Problems Brother 1     No Known Problems Son 1      Social History     Tobacco Use    Smoking status: Former     Current packs/day: 0.00     Types: Cigarettes     Quit date: 1999     Years since quittin.0    Smokeless tobacco: Never   Substance Use Topics    Alcohol use: No     Alcohol/week: 0.0 standard drinks of alcohol    Drug use: No        Review of Systems:    Review of Systems   Constitutional: Negative.    HENT: Negative.     Eyes: Negative.    Respiratory: Negative.     Cardiovascular: Negative.    Gastrointestinal: Negative.    Genitourinary: Negative.    Musculoskeletal: Negative.    Neurological: Negative.    Psychiatric/Behavioral: Negative.         OBJECTIVE:     Vital Signs (Most Recent)              Physical Exam:    Physical Exam  Constitutional:       Appearance: He is well-developed.   HENT:      Head: Normocephalic.   Eyes:      Conjunctiva/sclera: Conjunctivae normal.      Pupils: Pupils are equal, round, and reactive to light.   Cardiovascular:      Rate and Rhythm: Normal rate and regular rhythm.      Heart sounds: Normal heart sounds.   Pulmonary:      Effort: Pulmonary effort is normal.      Breath sounds: Normal breath sounds.   Abdominal:      General: Bowel sounds are normal.      Palpations: Abdomen is soft.   Musculoskeletal:         General: Normal range of motion.      Cervical back: Normal range of motion and neck supple.   Skin:     General: Skin is warm and dry.   Neurological:      Mental Status: He is alert and oriented to person, place, and time.      Deep Tendon Reflexes: Reflexes are normal and symmetric.         Laboratory      Diagnostic Results:      ASSESSMENT/PLAN:   Hypertension   Chronic kidney disease   Atrial fibrillation   Coronary Angioplasty       PLAN:Plan    scheduled for the placement of Left arm AV graft to be done today , 04/15/2024 . Stopped  anticoagulants for 3 days before the procedure.

## 2024-04-15 NOTE — ANESTHESIA PROCEDURE NOTES
Left Supraclavicular Peripheral Block    Patient location during procedure: pre-op   Block not for primary anesthetic.  Reason for block: at surgeon's request and post-op pain management   Post-op Pain Location: Left Arm   Start time: 4/15/2024 12:45 PM  Timeout: 4/15/2024 12:42 PM   End time: 4/15/2024 12:51 PM    Staffing  Authorizing Provider: Agapito Humphries MD  Performing Provider: Agapito Humphries MD    Staffing  Performed by: Agapito Humphries MD  Authorized by: Agapito Humphries MD    Preanesthetic Checklist  Completed: patient identified, IV checked, site marked, risks and benefits discussed, surgical consent, monitors and equipment checked, pre-op evaluation and timeout performed  Peripheral Block  Patient position: sitting  Prep: ChloraPrep  Patient monitoring: heart rate, cardiac monitor, continuous pulse ox, continuous capnometry and frequent blood pressure checks  Block type: supraclavicular  Laterality: left  Injection technique: single shot  Needle  Needle type: Stimuplex   Needle gauge: 20 G  Needle length: 4 in  Needle localization: anatomical landmarks and ultrasound guidance   -ultrasound image captured on disc.  Assessment  Injection assessment: negative aspiration, negative parasthesia and local visualized surrounding nerve  Paresthesia pain: none  Heart rate change: no  Slow fractionated injection: yes  Pain Tolerance: comfortable throughout block  Medications:    Medications: bupivacaine (pf) (MARCAINE) injection 0.5% - Perineural   30 mL - 4/15/2024 12:49:00 PM    Additional Notes  VSS.  [facility] RN monitoring vitals throughout procedure.  Patient tolerated procedure well.  With epinephrine 1:200,000  Lidocaine 1% 2 mL used for skin infiltration.

## 2024-04-15 NOTE — TRANSFER OF CARE
"Anesthesia Transfer of Care Note    Patient: Domingo Gross    Procedure(s) Performed: Procedure(s) (LRB):  INSERTION, GRAFT, ARTERIOVENOUS (Left)    Patient location: PACU    Anesthesia Type: general    Transport from OR: Transported from OR on 6-10 L/min O2 by face mask with adequate spontaneous ventilation    Post pain: adequate analgesia    Post assessment: no apparent anesthetic complications    Post vital signs: stable    Level of consciousness: awake    Nausea/Vomiting: no nausea/vomiting    Complications: none    Transfer of care protocol was followed      Last vitals: Visit Vitals  BP (!) 92/51   Pulse (!) 54   Temp 36.2 °C (97.2 °F) (Skin)   Resp 15   Ht 5' 10" (1.778 m)   Wt 81.6 kg (180 lb)   SpO2 100%   BMI 25.83 kg/m²     "

## 2024-04-16 VITALS
BODY MASS INDEX: 25.77 KG/M2 | WEIGHT: 180 LBS | DIASTOLIC BLOOD PRESSURE: 61 MMHG | OXYGEN SATURATION: 98 % | TEMPERATURE: 98 F | RESPIRATION RATE: 17 BRPM | HEIGHT: 70 IN | HEART RATE: 59 BPM | SYSTOLIC BLOOD PRESSURE: 117 MMHG

## 2024-04-18 ENCOUNTER — TELEPHONE (OUTPATIENT)
Dept: INTERNAL MEDICINE | Facility: CLINIC | Age: 64
End: 2024-04-18
Payer: COMMERCIAL

## 2024-04-18 NOTE — TELEPHONE ENCOUNTER
Patient here with grandson. (Grandson has appt today). Patient reports that his wife (also my patient) has new onset facial drooping. She has history of Bell's palsy. I advised that she could be having a recurrence of Bell's palsy or a stroke. I recommend she go to ER for immediate evaluation. Patient relaying that info to wife.

## 2024-04-19 ENCOUNTER — OFFICE VISIT (OUTPATIENT)
Dept: HEMATOLOGY/ONCOLOGY | Facility: CLINIC | Age: 64
End: 2024-04-19
Payer: COMMERCIAL

## 2024-04-19 ENCOUNTER — TELEPHONE (OUTPATIENT)
Dept: HEMATOLOGY/ONCOLOGY | Facility: CLINIC | Age: 64
End: 2024-04-19
Payer: COMMERCIAL

## 2024-04-19 DIAGNOSIS — D63.1 ANEMIA DUE TO STAGE 4 CHRONIC KIDNEY DISEASE: Primary | ICD-10-CM

## 2024-04-19 DIAGNOSIS — N18.4 ANEMIA DUE TO STAGE 4 CHRONIC KIDNEY DISEASE: Primary | ICD-10-CM

## 2024-04-19 DIAGNOSIS — I73.9 PAD (PERIPHERAL ARTERY DISEASE): ICD-10-CM

## 2024-04-19 DIAGNOSIS — D50.9 IRON DEFICIENCY ANEMIA, UNSPECIFIED IRON DEFICIENCY ANEMIA TYPE: ICD-10-CM

## 2024-04-19 DIAGNOSIS — I25.10 CORONARY ARTERY DISEASE INVOLVING NATIVE CORONARY ARTERY OF NATIVE HEART WITHOUT ANGINA PECTORIS: ICD-10-CM

## 2024-04-19 PROCEDURE — 99212 OFFICE O/P EST SF 10 MIN: CPT | Mod: 95,,, | Performed by: INTERNAL MEDICINE

## 2024-04-19 PROCEDURE — 3066F NEPHROPATHY DOC TX: CPT | Mod: CPTII,95,, | Performed by: INTERNAL MEDICINE

## 2024-04-19 PROCEDURE — 3062F POS MACROALBUMINURIA REV: CPT | Mod: CPTII,95,, | Performed by: INTERNAL MEDICINE

## 2024-04-23 PROBLEM — Z95.828 S/P ARTERIOVENOUS (AV) GRAFT PLACEMENT: Status: ACTIVE | Noted: 2024-04-23

## 2024-04-24 NOTE — PROGRESS NOTES
Established Patient - Audio Only Telehealth Visit     The patient location is: [place]  The chief complaint leading to consultation is: anemia  Visit type: Virtual visit with audio only (telephone)  Total time spent with patient: 10 min       The reason for the audio only service rather than synchronous audio and video virtual visit was related to technical difficulties or patient preference/necessity.     Each patient to whom I provide medical services by telemedicine is:  (1) informed of the relationship between the physician and patient and the respective role of any other health care provider with respect to management of the patient; and (2) notified that they may decline to receive medical services by telemedicine and may withdraw from such care at any time. Patient verbally consented to receive this service via voice-only telephone call.        This service was not originating from a related E/M service provided within the previous 7 days nor will  to an E/M service or procedure within the next 24 hours or my soonest available appointment.  Prevailing standard of care was able to be met in this audio-only visit.        PATIENT: Domingo Gross  MRN: [id number]  DATE: 4/19/2024      Subjective:     Chief complaint:  Chief Complaint   Patient presents with    Anemia    Follow-up       HEME History:  Mr. Gross returns for follow up on anemia associated with CKD.  He had previously been followed by Dr. Woods; in the past he has been on Epo injections however has not been on this since approximately March 2022. His most recent blood work showed Hgb of 9.0 on 11/14/22. He denies SOB, SHIRLEY, chest pain, light headedn-ess/dizziness. No changes to his functional capacity at this time. He denies bleeding or blood in stool/urine. He presently denies questions or concerns.            Interval History: Mr. Gross participates remotely in telemedicine follow-up today.   He recently needed RBC transfusion due to anemia. He  is getting Mircera with HD now. States he has been feeling pretty well lately and has no complaint.       Review of Systems   A comprehensive review of systems was performed; pertinent positives and negatives are noted in the HPI.        Objective:      Vitals: There were no vitals filed for this visit.  BMI: There is no height or weight on file to calculate BMI.      Physical Exam:   No physical exam due to remote nature of the visit.        Laboratory Data:  WBC   Date Value Ref Range Status   04/04/2024 4.61 3.90 - 12.70 K/uL Final     Hemoglobin   Date Value Ref Range Status   04/05/2024 7.4 (L) 14.0 - 18.0 g/dL Final     Hematocrit   Date Value Ref Range Status   04/04/2024 20.0 (L) 40.0 - 54.0 % Final     Platelets   Date Value Ref Range Status   04/04/2024 186 150 - 450 K/uL Final     Gran # (ANC)   Date Value Ref Range Status   04/04/2024 2.8 1.8 - 7.7 K/uL Final     Gran %   Date Value Ref Range Status   04/04/2024 59.8 38.0 - 73.0 % Final       Chemistry        Component Value Date/Time     04/15/2024 1113    K 2.9 (L) 04/15/2024 1113     04/15/2024 1113    CO2 25 04/15/2024 1113    BUN 23 04/15/2024 1113    CREATININE 3.7 (H) 04/15/2024 1113    GLU 93 04/15/2024 1113        Component Value Date/Time    CALCIUM 8.8 04/15/2024 1113    ALKPHOS 62 02/15/2024 1106    AST 78 (H) 02/15/2024 1106    ALT 32 02/15/2024 1106    BILITOT 0.5 02/15/2024 1106    ESTGFRAFRICA 28 (A) 07/25/2022 0842    EGFRNONAA 24 (A) 07/25/2022 0842              Assessment/Plan:     1. Anemia due to stage 4 chronic kidney disease    2. Iron deficiency anemia, unspecified iron deficiency anemia type    3. PAD (peripheral artery disease)    4. Coronary artery disease involving native coronary artery of native heart without angina pectoris      He is now getting mircera with nephrology. Will defer to nephrology for management of anemia at this time.     Med and Orders:       Follow Up:  Follow up in about 3 months (around  7/19/2024).      Above care plan was discussed with patient and all questions were addressed to their expressed satisfaction.       Robby Reddy MD, FACP  Hematology & Medical Oncology  [facility]       Total time of this visit, including time spent face to face with patient and/or via video/audio, and also in preparing for today's visit for MDM and documentation. (Medical Decision Making, including consideration of possible diagnoses, management options, complex medical record review, review of diagnostic tests and information, consideration and discussion of significant complications based on comorbidities, and discussion with providers involved with the care of the patient) 15 minutes. Greater than 50% was spent face to face with the patient counseling and coordinating care.

## 2024-05-01 ENCOUNTER — TELEPHONE (OUTPATIENT)
Dept: FAMILY MEDICINE | Facility: HOSPITAL | Age: 64
End: 2024-05-01
Payer: COMMERCIAL

## 2024-05-01 ENCOUNTER — PATIENT MESSAGE (OUTPATIENT)
Dept: INTERNAL MEDICINE | Facility: CLINIC | Age: 64
End: 2024-05-01
Payer: COMMERCIAL

## 2024-05-01 RX ORDER — HYDROXYZINE PAMOATE 25 MG/1
25 CAPSULE ORAL NIGHTLY PRN
Qty: 30 CAPSULE | Refills: 0 | Status: SHIPPED | OUTPATIENT
Start: 2024-05-01 | End: 2024-05-27

## 2024-05-01 NOTE — TELEPHONE ENCOUNTER
Called patient to let him know he needs to contact his pcp to get refills. He is not a patient of Dr. Sanford.

## 2024-05-01 NOTE — TELEPHONE ENCOUNTER
----- Message from Sophia Miranda sent at 5/1/2024 10:14 AM CDT -----  Regarding: refill  Type:  RX Refill Request    Who Called: pt  Refill or New Rx:refill  RX Name and Strength:hydrOXYzine pamoate (VISTARIL) 25 MG Cap  Preferred Pharmacy with phone number:[id number] - [place]   [address] AT [facility] [address]  [address] [address]  Phone: [phone] Fax: [phone]  Local or Mail Order:Local  Ordering Provider:Juvenal  Would the patient rather a call back or a response via [facility]? call  Best Call Back Number:[phone]  Additional Information:

## 2024-05-02 ENCOUNTER — LAB VISIT (OUTPATIENT)
Dept: LAB | Facility: HOSPITAL | Age: 64
End: 2024-05-02
Attending: INTERNAL MEDICINE
Payer: COMMERCIAL

## 2024-05-02 DIAGNOSIS — N18.4 ANEMIA DUE TO STAGE 4 CHRONIC KIDNEY DISEASE: ICD-10-CM

## 2024-05-02 DIAGNOSIS — N18.4 CKD (CHRONIC KIDNEY DISEASE) STAGE 4, GFR 15-29 ML/MIN: ICD-10-CM

## 2024-05-02 DIAGNOSIS — D63.1 ANEMIA DUE TO STAGE 4 CHRONIC KIDNEY DISEASE: ICD-10-CM

## 2024-05-02 LAB
BASOPHILS # BLD AUTO: 0.04 K/UL (ref 0–0.2)
BASOPHILS NFR BLD: 0.7 % (ref 0–1.9)
DIFFERENTIAL METHOD BLD: ABNORMAL
EOSINOPHIL # BLD AUTO: 0.2 K/UL (ref 0–0.5)
EOSINOPHIL NFR BLD: 3 % (ref 0–8)
ERYTHROCYTE [DISTWIDTH] IN BLOOD BY AUTOMATED COUNT: 13.4 % (ref 11.5–14.5)
HCT VFR BLD AUTO: 25.2 % (ref 40–54)
HGB BLD-MCNC: 8.4 G/DL (ref 14–18)
IMM GRANULOCYTES # BLD AUTO: 0.01 K/UL (ref 0–0.04)
IMM GRANULOCYTES NFR BLD AUTO: 0.2 % (ref 0–0.5)
LYMPHOCYTES # BLD AUTO: 1.4 K/UL (ref 1–4.8)
LYMPHOCYTES NFR BLD: 25 % (ref 18–48)
MCH RBC QN AUTO: 29.1 PG (ref 27–31)
MCHC RBC AUTO-ENTMCNC: 33.3 G/DL (ref 32–36)
MCV RBC AUTO: 87 FL (ref 82–98)
MONOCYTES # BLD AUTO: 0.6 K/UL (ref 0.3–1)
MONOCYTES NFR BLD: 11.1 % (ref 4–15)
NEUTROPHILS # BLD AUTO: 3.4 K/UL (ref 1.8–7.7)
NEUTROPHILS NFR BLD: 60 % (ref 38–73)
NRBC BLD-RTO: 0 /100 WBC
PLATELET # BLD AUTO: 206 K/UL (ref 150–450)
PMV BLD AUTO: 10.3 FL (ref 9.2–12.9)
RBC # BLD AUTO: 2.89 M/UL (ref 4.6–6.2)
WBC # BLD AUTO: 5.69 K/UL (ref 3.9–12.7)

## 2024-05-02 PROCEDURE — 36415 COLL VENOUS BLD VENIPUNCTURE: CPT | Mod: NTX | Performed by: INTERNAL MEDICINE

## 2024-05-02 PROCEDURE — 85025 COMPLETE CBC W/AUTO DIFF WBC: CPT | Mod: TXP | Performed by: INTERNAL MEDICINE

## 2024-05-06 PROBLEM — J18.9 CAP (COMMUNITY ACQUIRED PNEUMONIA): Status: RESOLVED | Noted: 2024-01-27 | Resolved: 2024-05-06

## 2024-05-06 PROBLEM — N17.9 AKI (ACUTE KIDNEY INJURY): Status: RESOLVED | Noted: 2020-01-30 | Resolved: 2024-05-06

## 2024-05-09 ENCOUNTER — TELEPHONE (OUTPATIENT)
Dept: TRANSPLANT | Facility: CLINIC | Age: 64
End: 2024-05-09
Payer: COMMERCIAL

## 2024-05-09 ENCOUNTER — DOCUMENTATION ONLY (OUTPATIENT)
Dept: TRANSPLANT | Facility: CLINIC | Age: 64
End: 2024-05-09
Payer: COMMERCIAL

## 2024-05-09 NOTE — TELEPHONE ENCOUNTER
----- Message from Dennis Saavedra Jr., MD sent at 5/9/2024 12:18 PM CDT -----  Not a candidate  ----- Message -----  From: Abadie, Michelle, RN  Sent: 5/8/2024   2:52 PM CDT  To: Dennis Saavedra Jr., MD    Hi Dr Saavedra,     We have received a referral on the above patient. Please review CTSCAN to determine if patient is eligible for [facility] clinic?     Thanks,   Bekah

## 2024-05-16 PROBLEM — T82.41XA HEMODIALYSIS CATHETER MALFUNCTION: Status: ACTIVE | Noted: 2024-05-16

## 2024-05-20 ENCOUNTER — HOSPITAL ENCOUNTER (OUTPATIENT)
Facility: HOSPITAL | Age: 64
Discharge: HOME | End: 2024-05-20
Attending: SURGERY | Admitting: SURGERY
Payer: COMMERCIAL

## 2024-05-20 ENCOUNTER — ANESTHESIA EVENT (OUTPATIENT)
Dept: SURGERY | Facility: HOSPITAL | Age: 64
End: 2024-05-20
Payer: COMMERCIAL

## 2024-05-20 ENCOUNTER — ANESTHESIA (OUTPATIENT)
Dept: SURGERY | Facility: HOSPITAL | Age: 64
End: 2024-05-20
Payer: COMMERCIAL

## 2024-05-20 DIAGNOSIS — T82.41XA HEMODIALYSIS CATHETER DYSFUNCTION, INITIAL ENCOUNTER: Primary | ICD-10-CM

## 2024-05-20 DIAGNOSIS — I10 PRIMARY HYPERTENSION: ICD-10-CM

## 2024-05-20 DIAGNOSIS — J44.9 CHRONIC OBSTRUCTIVE PULMONARY DISEASE, UNSPECIFIED COPD TYPE: Chronic | ICD-10-CM

## 2024-05-20 DIAGNOSIS — N18.6 ESRD (END STAGE RENAL DISEASE): ICD-10-CM

## 2024-05-20 LAB
ANION GAP SERPL CALC-SCNC: 11 MMOL/L (ref 8–16)
BASOPHILS # BLD AUTO: 0.06 K/UL (ref 0–0.2)
BASOPHILS NFR BLD: 1.3 % (ref 0–1.9)
BUN SERPL-MCNC: 37 MG/DL (ref 8–23)
CALCIUM SERPL-MCNC: 8.8 MG/DL (ref 8.7–10.5)
CHLORIDE SERPL-SCNC: 104 MMOL/L (ref 95–110)
CO2 SERPL-SCNC: 24 MMOL/L (ref 23–29)
CREAT SERPL-MCNC: 4.2 MG/DL (ref 0.5–1.4)
DIFFERENTIAL METHOD BLD: ABNORMAL
EOSINOPHIL # BLD AUTO: 0.2 K/UL (ref 0–0.5)
EOSINOPHIL NFR BLD: 3.9 % (ref 0–8)
ERYTHROCYTE [DISTWIDTH] IN BLOOD BY AUTOMATED COUNT: 13.4 % (ref 11.5–14.5)
EST. GFR  (NO RACE VARIABLE): 15 ML/MIN/1.73 M^2
GLUCOSE SERPL-MCNC: 94 MG/DL (ref 70–110)
HCT VFR BLD AUTO: 24.6 % (ref 40–54)
HGB BLD-MCNC: 8.2 G/DL (ref 14–18)
IMM GRANULOCYTES # BLD AUTO: 0.01 K/UL (ref 0–0.04)
IMM GRANULOCYTES NFR BLD AUTO: 0.2 % (ref 0–0.5)
LYMPHOCYTES # BLD AUTO: 0.8 K/UL (ref 1–4.8)
LYMPHOCYTES NFR BLD: 17 % (ref 18–48)
MCH RBC QN AUTO: 28.7 PG (ref 27–31)
MCHC RBC AUTO-ENTMCNC: 33.3 G/DL (ref 32–36)
MCV RBC AUTO: 86 FL (ref 82–98)
MONOCYTES # BLD AUTO: 0.6 K/UL (ref 0.3–1)
MONOCYTES NFR BLD: 13.9 % (ref 4–15)
NEUTROPHILS # BLD AUTO: 2.9 K/UL (ref 1.8–7.7)
NEUTROPHILS NFR BLD: 63.7 % (ref 38–73)
NRBC BLD-RTO: 0 /100 WBC
PLATELET # BLD AUTO: 135 K/UL (ref 150–450)
PMV BLD AUTO: 10 FL (ref 9.2–12.9)
POTASSIUM SERPL-SCNC: 3.1 MMOL/L (ref 3.5–5.1)
RBC # BLD AUTO: 2.86 M/UL (ref 4.6–6.2)
SODIUM SERPL-SCNC: 139 MMOL/L (ref 136–145)
WBC # BLD AUTO: 4.6 K/UL (ref 3.9–12.7)

## 2024-05-20 PROCEDURE — 25000003 PHARM REV CODE 250: Performed by: SURGERY

## 2024-05-20 PROCEDURE — 25000003 PHARM REV CODE 250: Performed by: NURSE ANESTHETIST, CERTIFIED REGISTERED

## 2024-05-20 PROCEDURE — 63600175 PHARM REV CODE 636 W HCPCS: Performed by: SURGERY

## 2024-05-20 PROCEDURE — 85025 COMPLETE CBC W/AUTO DIFF WBC: CPT | Performed by: SURGERY

## 2024-05-20 PROCEDURE — 80048 BASIC METABOLIC PNL TOTAL CA: CPT | Performed by: SURGERY

## 2024-05-20 PROCEDURE — 63600175 PHARM REV CODE 636 W HCPCS: Performed by: NURSE ANESTHETIST, CERTIFIED REGISTERED

## 2024-05-20 PROCEDURE — 36000707: Performed by: SURGERY

## 2024-05-20 PROCEDURE — 37000008 HC ANESTHESIA 1ST 15 MINUTES: Performed by: SURGERY

## 2024-05-20 PROCEDURE — 37000009 HC ANESTHESIA EA ADD 15 MINS: Performed by: SURGERY

## 2024-05-20 PROCEDURE — 71000016 HC POSTOP RECOV ADDL HR: Performed by: SURGERY

## 2024-05-20 PROCEDURE — 36415 COLL VENOUS BLD VENIPUNCTURE: CPT | Performed by: SURGERY

## 2024-05-20 PROCEDURE — 71000015 HC POSTOP RECOV 1ST HR: Performed by: SURGERY

## 2024-05-20 PROCEDURE — 36000706: Performed by: SURGERY

## 2024-05-20 PROCEDURE — D9220A PRA ANESTHESIA: Mod: CRNA,,, | Performed by: NURSE ANESTHETIST, CERTIFIED REGISTERED

## 2024-05-20 PROCEDURE — D9220A PRA ANESTHESIA: Mod: ANES,,, | Performed by: STUDENT IN AN ORGANIZED HEALTH CARE EDUCATION/TRAINING PROGRAM

## 2024-05-20 RX ORDER — ACETAMINOPHEN 325 MG/1
650 TABLET ORAL EVERY 4 HOURS PRN
Status: DISCONTINUED | OUTPATIENT
Start: 2024-05-20 | End: 2024-05-20 | Stop reason: HOSPADM

## 2024-05-20 RX ORDER — VANCOMYCIN HYDROCHLORIDE 1 G/20ML
INJECTION, POWDER, LYOPHILIZED, FOR SOLUTION INTRAVENOUS
Status: DISCONTINUED | OUTPATIENT
Start: 2024-05-20 | End: 2024-05-20 | Stop reason: HOSPADM

## 2024-05-20 RX ORDER — SODIUM CHLORIDE 9 MG/ML
INJECTION, SOLUTION INTRAVENOUS CONTINUOUS
Status: DISCONTINUED | OUTPATIENT
Start: 2024-05-20 | End: 2024-05-20 | Stop reason: HOSPADM

## 2024-05-20 RX ORDER — LIDOCAINE HYDROCHLORIDE 10 MG/ML
INJECTION, SOLUTION EPIDURAL; INFILTRATION; INTRACAUDAL; PERINEURAL
Status: DISCONTINUED | OUTPATIENT
Start: 2024-05-20 | End: 2024-05-20 | Stop reason: HOSPADM

## 2024-05-20 RX ORDER — PROCHLORPERAZINE EDISYLATE 5 MG/ML
5 INJECTION INTRAMUSCULAR; INTRAVENOUS EVERY 30 MIN PRN
Status: DISCONTINUED | OUTPATIENT
Start: 2024-05-20 | End: 2024-05-20 | Stop reason: HOSPADM

## 2024-05-20 RX ORDER — FENTANYL CITRATE 50 UG/ML
INJECTION, SOLUTION INTRAMUSCULAR; INTRAVENOUS
Status: DISCONTINUED | OUTPATIENT
Start: 2024-05-20 | End: 2024-05-20

## 2024-05-20 RX ORDER — HYDROCODONE BITARTRATE AND ACETAMINOPHEN 5; 325 MG/1; MG/1
1 TABLET ORAL EVERY 6 HOURS PRN
Qty: 10 TABLET | Refills: 0 | Status: SHIPPED | OUTPATIENT
Start: 2024-05-20

## 2024-05-20 RX ORDER — MIDAZOLAM HYDROCHLORIDE 1 MG/ML
INJECTION INTRAMUSCULAR; INTRAVENOUS
Status: DISCONTINUED | OUTPATIENT
Start: 2024-05-20 | End: 2024-05-20

## 2024-05-20 RX ORDER — MUPIROCIN 20 MG/G
OINTMENT TOPICAL
Status: DISCONTINUED | OUTPATIENT
Start: 2024-05-20 | End: 2024-05-20 | Stop reason: HOSPADM

## 2024-05-20 RX ORDER — CEFAZOLIN SODIUM 1 G/3ML
INJECTION, POWDER, FOR SOLUTION INTRAMUSCULAR; INTRAVENOUS
Status: DISCONTINUED | OUTPATIENT
Start: 2024-05-20 | End: 2024-05-20

## 2024-05-20 RX ADMIN — SODIUM CHLORIDE: 0.9 INJECTION, SOLUTION INTRAVENOUS at 11:05

## 2024-05-20 RX ADMIN — MIDAZOLAM HYDROCHLORIDE 2 MG: 1 INJECTION, SOLUTION INTRAMUSCULAR; INTRAVENOUS at 11:05

## 2024-05-20 RX ADMIN — FENTANYL CITRATE 25 MCG: 50 INJECTION INTRAMUSCULAR; INTRAVENOUS at 12:05

## 2024-05-20 RX ADMIN — CEFAZOLIN 2 G: 330 INJECTION, POWDER, FOR SOLUTION INTRAMUSCULAR; INTRAVENOUS at 12:05

## 2024-05-20 RX ADMIN — FENTANYL CITRATE 50 MCG: 50 INJECTION INTRAMUSCULAR; INTRAVENOUS at 11:05

## 2024-05-20 NOTE — OP NOTE
[place] - [facility] (Hospital)  Operative Note      Date of Procedure: 5/20/2024     Procedure: Procedure(s) (LRB):  REMOVAL, CATHETER, CENTRAL VENOUS, TUNNELED (Right)     Surgeons and Role:     * Scott Pascual MD - Primary    Assisting Surgeon: None    Pre-Operative Diagnosis: Chronic obstructive pulmonary disease, unspecified COPD type [J44.9]  Primary hypertension [I10]  ESRD (end stage renal disease) [N18.6]  Hemodialysis catheter dysfunction, initial encounter [T82.41XA]    Post-Operative Diagnosis: Post-Op Diagnosis Codes:     * Chronic obstructive pulmonary disease, unspecified COPD type [J44.9]     * Primary hypertension [I10]     * ESRD (end stage renal disease) [N18.6]     * Hemodialysis catheter dysfunction, initial encounter [T82.41XA]    Anesthesia: General    Operative Findings (including complications, if any): removal perm cath done under mac anaesthesia , patient tolerated well and was sent to recovery room in stable condition.    Description of Technical Procedures: Operative Note       Surgery Date: 5/20/2024     Surgeons and Role:     * Scott Pascual MD - Primary    Pre-op Diagnosis:  Chronic obstructive pulmonary disease, unspecified COPD type [J44.9]  Primary hypertension [I10]  ESRD (end stage renal disease) [N18.6]  Hemodialysis catheter dysfunction, initial encounter [T82.41XA]    Post-op Diagnosis: Post-Op Diagnosis Codes:     * Chronic obstructive pulmonary disease, unspecified COPD type [J44.9]     * Primary hypertension [I10]     * ESRD (end stage renal disease) [N18.6]     * Hemodialysis catheter dysfunction, initial encounter [T82.41XA]    Procedure(s) (LRB):  REMOVAL, CATHETER, CENTRAL VENOUS, TUNNELED (Right)    Anesthesia: General    Procedure in Detail/Findings:    The patient was positioned, the right side of neck and   chest prepped and draped in normal sterile manner using ChloraPrep.  Timeout was   called.  The patient was properly recognized.  The area was then  infiltrated   using 1% Xylocaine solution.  Incision was made in the same area, taken down to   deep subcutaneous tissue.  Subcutaneous bleeders clamped and bovied and further   taken down.  Cuff portion was then  from deep subcutaneous tissue and   was retrieved both proximally and distally.  Vein was ligated proximally using   interrupted 3-0 Vicryl, subcutaneous tissue with 3-0 Vicryl, skin was closed   using running 4-0 nylon suture.  Sterile gauze dressing was applied.  Instrument   count, sponge count and needle count correct.  The patient tolerated it well.    Estimated blood loss was 5 mL.  Specimen removed was catheter, submitted for   gross ID only.  Postoperative diagnosis is malfunctioning Perm-A-Cath, status   post PD catheter.    Estimated Blood Loss: * No values recorded between 5/20/2024 12:13 PM and 5/20/2024 12:26 PM *           Specimens (From admission, onward)      None          Implants: * No implants in log *           Disposition: PACU - hemodynamically stable.           Condition: Good    Attestation:  I performed the procedure.           Discharge Note    Admit Date: 5/20/2024    Attending Physician: Scott Pascual MD     Discharge Physician: Scott Pascual MD    Final Diagnosis: Post-Op Diagnosis Codes:     * Chronic obstructive pulmonary disease, unspecified COPD type [J44.9]     * Primary hypertension [I10]     * ESRD (end stage renal disease) [N18.6]     * Hemodialysis catheter dysfunction, initial encounter [T82.41XA]    Disposition: Home or Self Care    Patient Instructions:   Current Discharge Medication List        CONTINUE these medications which have CHANGED    Details   !! HYDROcodone-acetaminophen (NORCO) 5-325 mg per tablet Take 1 tablet by mouth every 6 (six) hours as needed for Pain.  Qty: 10 tablet, Refills: 0    Comments: Quantity prescribed more than 7 day supply? Yes, quantity medically necessary       !! - Potential duplicate medications found.  Please discuss with provider.        CONTINUE these medications which have NOT CHANGED    Details   amLODIPine (NORVASC) 5 MG tablet Take 1 tablet (5 mg total) by mouth once daily.  Qty: 90 tablet, Refills: 3    Comments: .  Associated Diagnoses: Primary hypertension      carvediloL (COREG) 25 MG tablet Take 1 tablet (25 mg total) by mouth 2 (two) times daily with meals.  Qty: 180 tablet, Refills: 3    Comments: .  Associated Diagnoses: Persistent atrial fibrillation; Primary hypertension      hydrALAZINE (APRESOLINE) 50 MG tablet Take 1 tablet (50 mg total) by mouth every 8 (eight) hours.  Qty: 270 tablet, Refills: 3    Comments: .  Associated Diagnoses: Essential hypertension      !! levothyroxine (SYNTHROID) 75 MCG tablet Take 1 tablet (75 mcg total) by mouth before breakfast.  Qty: 30 tablet, Refills: 11      albuterol (PROVENTIL/VENTOLIN HFA) 90 mcg/actuation inhaler INHALE 2 PUFFS INTO THE LUNGS EVERY 6 HOURS AS NEEDED FOR WHEEZING  Qty: 54 g, Refills: 3      albuterol-ipratropium (DUO-NEB) 2.5 mg-0.5 mg/3 mL nebulizer solution Take 3 mLs by nebulization every 6 (six) hours as needed for Wheezing or Shortness of Breath (or cough). Rescue  Qty: 75 mL, Refills: 3    Associated Diagnoses: COPD exacerbation      apixaban (ELIQUIS) 5 mg Tab Take 1 tablet (5 mg total) by mouth 2 (two) times daily.  Qty: 180 tablet, Refills: 3    Associated Diagnoses: Persistent atrial fibrillation      clopidogreL (PLAVIX) 75 mg tablet Take 1 tablet (75 mg total) by mouth once daily.  Qty: 90 tablet, Refills: 3    Associated Diagnoses: Atherosclerosis of native coronary artery of native heart without angina pectoris      coenzyme Q10 100 mg capsule Take 100 mg by mouth once daily.      eplerenone (INSPRA) 50 MG Tab Take 1 tablet (50 mg total) by mouth once daily.  Qty: 90 tablet, Refills: 3    Associated Diagnoses: Essential hypertension      epoetin robi-epbx (RETACRIT) 40,000 unit/mL injection Inject 0.1 mLs (4,000 Units total)  into the skin every 30 days.  Qty: 0.1 mL, Refills: 0    Associated Diagnoses: Anemia due to stage 4 chronic kidney disease      !! HYDROcodone-acetaminophen (NORCO) 5-325 mg per tablet Take 1 tablet by mouth every 6 (six) hours as needed for Pain.  Qty: 24 tablet, Refills: 0    Comments: Quantity prescribed more than 7 day supply? Yes, quantity medically necessary      hydrOXYzine pamoate (VISTARIL) 25 MG Cap Take 1 capsule (25 mg total) by mouth nightly as needed (Insomnia/Anxiety).  Qty: 30 capsule, Refills: 0      isosorbide mononitrate (IMDUR) 30 MG 24 hr tablet Take 1 tablet (30 mg total) by mouth once daily.  Qty: 90 tablet, Refills: 3    Associated Diagnoses: Atherosclerosis of native coronary artery of native heart without angina pectoris; Chest pain, unspecified type      !! levothyroxine (SYNTHROID) 50 MCG tablet Take 50 mcg by mouth every morning.      LIDOcaine (LIDODERM) 5 % Place 1 patch onto the skin daily as needed (back pain). Remove & Discard patch within 12 hours or as directed by MD  Qty: 30 patch, Refills: 0      nitroGLYCERIN (NITROSTAT) 0.4 MG SL tablet Place 1 tablet (0.4 mg total) under the tongue every 5 (five) minutes as needed for Chest pain.  Qty: 90 tablet, Refills: 3    Associated Diagnoses: Atherosclerosis of native coronary artery of native heart without angina pectoris; Chest pain, unspecified type      ranolazine (RANEXA) 500 MG Tb12 Take 1 tablet (500 mg total) by mouth 2 (two) times daily.  Qty: 180 tablet, Refills: 3    Associated Diagnoses: Atherosclerosis of native coronary artery of native heart without angina pectoris; Chest pain, unspecified type      rosuvastatin (CRESTOR) 40 MG Tab Take 1 tablet (40 mg total) by mouth every evening.  Qty: 90 tablet, Refills: 3    Associated Diagnoses: Mixed hyperlipidemia      sodium bicarbonate 650 MG tablet Take 1 tablet (650 mg total) by mouth once daily. for 30 doses  Qty: 30 tablet, Refills: 3       !! - Potential duplicate  medications found. Please discuss with provider.          Discharge Procedure Orders (must include Diet, Follow-up, Activity)   Discharge Procedure Orders (must include Diet, Follow-up, Activity)   Diet general     Keep surgical extremity elevated     Lifting restrictions     Leave dressing on - Keep it clean, dry, and intact until clinic visit     Call MD for:  temperature >100.4     Call MD for:  persistent nausea and vomiting     Call MD for:  severe uncontrolled pain     Call MD for:  redness, tenderness, or signs of infection (pain, swelling, redness, odor or green/yellow discharge around incision site)        Discharge Date: No discharge date for patient encounter.      Significant Surgical Tasks Conducted by the Assistant(s), if Applicable: na    Estimated Blood Loss (EBL):  none         Implants: * No implants in log *    Specimens:   Specimen (24h ago, onward)      None                    Condition: Good    Disposition: PACU - hemodynamically stable.    Attestation: I performed the procedure.    Discharge Note    OUTCOME: Patient tolerated treatment/procedure well without complication and is now ready for discharge.    DISPOSITION: Home or Self Care    FINAL DIAGNOSIS:  Hemodialysis catheter dysfunction    FOLLOWUP: In clinic    DISCHARGE INSTRUCTIONS:    Discharge Procedure Orders   Diet general     Keep surgical extremity elevated     Lifting restrictions     Leave dressing on - Keep it clean, dry, and intact until clinic visit     Call MD for:  temperature >100.4     Call MD for:  persistent nausea and vomiting     Call MD for:  severe uncontrolled pain     Call MD for:  redness, tenderness, or signs of infection (pain, swelling, redness, odor or green/yellow discharge around incision site)

## 2024-05-20 NOTE — DISCHARGE INSTRUCTIONS
DRESSING CARE AND ACTIVITY  -KEEP DRESSING CLEAN, DRY, AND INTACT FOR 48 HOURS  -AFTER 48 HOURS YOU CAN SHOWER AND LIGHTLY GET INCISION WET WITH SOAP AND WATER  -REPLACE WITH WATER PROOF DRESSING AS NEEDED  -TAKE PAIN PILL AS NEEDED FOR PAIN  -NO HEAVY LIFTING OR STRENUOUS ACTIVITY UNTIL CLEARED  ANESTHESIA  -For the first 24 hours after surgery:  Do not drive, use heavy equipment, make important decisions, or drink alcohol  -It is normal to feel sleepy for several hours.  Rest until you are more awake.  -Have someone stay with you, if needed.  They can watch for problems and help keep you safe.  -Some possible post anesthesia side effects include: nausea and vomiting, sore throat and hoarseness, sleepiness, and dizziness.    PAIN  -If you have pain after surgery, pain medicine will help you feel better.  Take it as directed, before pain becomes severe.  Most pain relievers taken by mouth need at least 20-30 minutes to start working.  -Do not drive or drink alcohol while taking pain medicine.  -Pain medication can upset your stomach.  Taking them with a little food may help.  -Other ways to help control pain: elevation, ice, and relaxation  -Call your surgeon if still having unmanageable pain an hour after taking pain medicine.  -Pain medicine can cause constipation.  Taking an over-the counter stool softener while on prescription pain medicine and drinking plenty of fluids can prevent this side effect.  -Call your surgeon if you have severe side effects like: breathing problems, trouble waking up, dizziness, confusion, or severe constipation.    NAUSEA  -Some people have nausea after surgery.  This is often because of anesthesia, pain, pain medicine, or the stress of surgery.  -Do not push yourself to eat.  Start off with clear liquids and soup.  Slowly move to solid foods.  Don't eat fatty, rich, spicy foods at first.  Eat smaller amounts.  -If you develop persistent nausea and vomiting please notify your surgeon  immediately.    BLEEDING  -Different types of surgery require different types of care and dressing changes.  It is important to follow all instructions and advice from your surgeon.  Change dressing as directed.  Call your surgeon for any concerns regarding postop bleeding.    SIGNS OF INFECTION  -Signs of infection include: fever, swelling, drainage, and redness  -Notify your surgeon if you have a fever of 100.4 F (38.0 C) or higher.  -Notify your surgeon if you notice redness, swelling, increased pain, pus, or a foul smell at the incision site.

## 2024-05-20 NOTE — H&P
History & Physical    SUBJECTIVE:     History of Present Illness:  Patient is a 63 y.o. male presents with  malfunction catheter right IJ, s/p graft placement left arm working well    No chief complaint on file.      Review of patient's allergies indicates:   Allergen Reactions    Ace inhibitors Rash       Current Facility-Administered Medications   Medication Dose Route Frequency Provider Last Rate Last Admin    0.9%  NaCl infusion   Intravenous Continuous Scott Pascual MD         Facility-Administered Medications Ordered in Other Encounters   Medication Dose Route Frequency Provider Last Rate Last Admin    sodium chloride 0.9% infusion   Intravenous Continuous PRN Bushra Cervantes CRNA   New Bag at 11/14/22 1033       Past Medical History:   Diagnosis Date    Allergy     Anemia     Anticoagulant long-term use     Arthritis     CKD (chronic kidney disease) stage 4, GFR 15-29 ml/min     COPD (chronic obstructive pulmonary disease) 08/20/2021    Coronary artery disease     Heart attack 10/04/2019    Hematuria     Hemothorax     Hyperlipidemia     Hypertension     Hyperuricemia     Hypocalcemia     Hypokalemia     Hypophosphatemia     Hypothyroidism 3/2/2023    PAD (peripheral artery disease)     Proteinuria     Vitamin D deficiency      Past Surgical History:   Procedure Laterality Date    ABDOMINAL AORTOGRAPHY N/A 5/10/2019    Procedure: AORTOGRAM-ABDOMINAL;  Surgeon: Keo Jenkins MD;  Location: [facility] CATH LAB/EP;  Service: Cardiology;  Laterality: N/A;  RSFA intervention     AORTOGRAPHY WITH SERIALOGRAPHY N/A 12/3/2021    Procedure: AORTOGRAM, WITH SERIALOGRAPHY;  Surgeon: Keo Jenkins MD;  Location: [facility] CATH LAB/EP;  Service: Cardiology;  Laterality: N/A;    AORTOGRAPHY WITH SERIALOGRAPHY N/A 4/1/2022    Procedure: AORTOGRAM, WITH SERIALOGRAPHY;  Surgeon: Keo Jenkins MD;  Location: [facility] CATH LAB/EP;  Service: Cardiology;  Laterality: N/A;    ATHERECTOMY, CORONARY N/A 4/25/2023    Procedure:  Atherectomy-coronary;  Surgeon: Keo Jenkins MD;  Location: [facility] CATH LAB/EP;  Service: Cardiology;  Laterality: N/A;    BONE MARROW BIOPSY Right 10/12/2021    Procedure: BIOPSY-BONE MARROW;  Surgeon: Naseem Woods MD;  Location: [facility] OR;  Service: Oncology;  Laterality: Right;    CARDIAC CATHETERIZATION      CATARACT EXTRACTION      CATARACT EXTRACTION W/  INTRAOCULAR LENS IMPLANT Right 6/8/2020    Procedure: EXTRACTION, CATARACT, WITH IOL INSERTION;  Surgeon: Tin Light MD;  Location: [facility] OR;  Service: Ophthalmology;  Laterality: Right;    CATARACT EXTRACTION W/  INTRAOCULAR LENS IMPLANT Left 7/2/2020    Procedure: EXTRACTION, CATARACT, WITH IOL INSERTION;  Surgeon: Tin Light MD;  Location: [facility] OR;  Service: Ophthalmology;  Laterality: Left;    COLONOSCOPY N/A 1/6/2022    Procedure: COLONOSCOPY;  Surgeon: Kathe Penaloza MD;  [facility]  Service: Endoscopy;  Laterality: N/A;  COVID test at [facility] on 1/3-GT  okay to hold Plavix for 5 days and aspirin per Dr. Becerra  2nd floor due toextensive cardiac history   instructions mailed and [facility] -     CORONARY ANGIOGRAPHY N/A 3/29/2019    Procedure: ANGIOGRAM, CORONARY ARTERY;  Surgeon: Keo Jenkins MD;  Location: [facility] CATH LAB/EP;  Service: Cardiology;  Laterality: N/A;    CORONARY ANGIOGRAPHY Left 10/11/2019    Procedure: ANGIOGRAM, CORONARY ARTERY;  Surgeon: Keo Jenkins MD;  Location: [facility] CATH LAB/EP;  Service: Cardiology;  Laterality: Left;    CORONARY ANGIOGRAPHY N/A 4/10/2023    Procedure: ANGIOGRAM, CORONARY ARTERY;  Surgeon: Keo Jenkins MD;  Location: [facility] CATH LAB/EP;  Service: Cardiology;  Laterality: N/A;    CORONARY ANGIOPLASTY WITH STENT PLACEMENT  03/29/2019    mid and distal RCA    ESOPHAGOGASTRODUODENOSCOPY N/A 1/6/2022    Procedure: EGD (ESOPHAGOGASTRODUODENOSCOPY);  Surgeon: Kathe Penaloza MD;  Location: [facility] ENDO (2ND FLR);  Service: Endoscopy;  Laterality: N/A;    EYE SURGERY       INSERTION OF TUNNELED CENTRAL VENOUS HEMODIALYSIS CATHETER N/A 2/9/2024    Procedure: INSERTION, CATHETER, HEMODIALYSIS, DUAL LUMEN;  Surgeon: Wang Mendieta MD;  Location: [facility] OR;  Service: General;  Laterality: N/A;    INSTANTANEOUS WAVE-FREE RATIO (IFR) N/A 4/25/2023    Procedure: Instantaneous Wave-Free Ratio (IFR);  Surgeon: Keo Jenkins MD;  Location: [facility] CATH LAB/EP;  Service: Cardiology;  Laterality: N/A;    INTRAVASCULAR ULTRASOUND, NON-CORONARY  8/12/2022    Procedure: Intravascular Ultrasound, Non-Coronary;  Surgeon: Keo Jenkins MD;  Location: [facility] CATH LAB/EP;  Service: Cardiology;;    IVUS, CORONARY  4/25/2023    Procedure: IVUS, Coronary;  Surgeon: Keo Jenkins MD;  Location: [facility] CATH LAB/EP;  Service: Cardiology;;    IVUS, CORONARY  5/16/2023    Procedure: IVUS, Coronary;  Surgeon: Keo Jenkins MD;  Location: [facility] CATH LAB/EP;  Service: Cardiology;;  CX    LEFT HEART CATHETERIZATION N/A 3/29/2019    Procedure: Left heart cath;  Surgeon: Keo Jenkins MD;  Location: [facility] CATH LAB/EP;  Service: Cardiology;  Laterality: N/A;    LEFT HEART CATHETERIZATION Left 10/8/2019    Procedure: Left heart cath;  Surgeon: Keo Jenkins MD;  Location: [facility] CATH LAB/EP;  Service: Cardiology;  Laterality: Left;    LEFT HEART CATHETERIZATION Left 4/10/2023    Procedure: Left heart cath;  Surgeon: Keo Jenkins MD;  Location: [facility] CATH LAB/EP;  Service: Cardiology;  Laterality: Left;    LEFT HEART CATHETERIZATION Left 4/25/2023    Procedure: Left heart cath;  Surgeon: Keo Jenkins MD;  Location: [facility] CATH LAB/EP;  Service: Cardiology;  Laterality: Left;    LEFT HEART CATHETERIZATION Left 5/16/2023    Procedure: Left heart cath;  Surgeon: Keo Jenkins MD;  Location: [facility] CATH LAB/EP;  Service: Cardiology;  Laterality: Left;    PERCUTANEOUS TRANSLUMINAL ANGIOPLASTY (PTA) OF PERIPHERAL VESSEL N/A 7/12/2019    Procedure: PTA, PERIPHERAL VESSEL;  Surgeon: Keo Jenkins MD;  Location: [facility] CATH  LAB/EP;  Service: Cardiology;  Laterality: N/A;    PERCUTANEOUS TRANSLUMINAL ANGIOPLASTY (PTA) OF PERIPHERAL VESSEL Left 5/20/2022    Procedure: PTA, PERIPHERAL VESSEL;  Surgeon: Keo Jenkins MD;  Location: [facility] CATH LAB/EP;  Service: Cardiology;  Laterality: Left;    PERCUTANEOUS TRANSLUMINAL ANGIOPLASTY (PTA) OF PERIPHERAL VESSEL Right 8/12/2022    Procedure: PTA, PERIPHERAL VESSEL;  Surgeon: Keo Jenkins MD;  Location: [facility] CATH LAB/EP;  Service: Cardiology;  Laterality: Right;    PERCUTANEOUS TRANSLUMINAL BALLOON ANGIOPLASTY OF CORONARY ARTERY  4/25/2023    Procedure: Angioplasty-coronary;  Surgeon: Keo Jenkins MD;  Location: [facility] CATH LAB/EP;  Service: Cardiology;;    PLACEMENT OF ARTERIOVENOUS GRAFT Left 4/15/2024    Procedure: INSERTION, GRAFT, ARTERIOVENOUS;  Surgeon: Scott Pascual MD;  Location: [facility] OR;  Service: General;  Laterality: Left;    PTCA, SINGLE VESSEL  5/16/2023    Procedure: PTCA, Single Vessel;  Surgeon: Keo Jenkins MD;  Location: [facility] CATH LAB/EP;  Service: Cardiology;;  CX    REMOVAL OF HEMODIALYSIS CATHETER Right 2/9/2024    Procedure: REMOVAL, CATHETER, HEMODIALYSIS;  Surgeon: Wang Mendieta MD;  Location: [facility] OR;  Service: General;  Laterality: Right;    STENT, DRUG ELUTING, SINGLE VESSEL, CORONARY  4/25/2023    Procedure: Stent, Drug Eluting, Single Vessel, Coronary;  Surgeon: Keo Jenkins MD;  Location: [facility] CATH LAB/EP;  Service: Cardiology;;    STENT, DRUG ELUTING, SINGLE VESSEL, CORONARY  5/16/2023    Procedure: Stent, Drug Eluting, Single Vessel, Coronary;  Surgeon: Keo Jenkins MD;  Location: [facility] CATH LAB/EP;  Service: Cardiology;;  CX    TRANSESOPHAGEAL ECHOCARDIOGRAM WITH POSSIBLE CARDIOVERSION (JOSE W/ POSS CARDIOVERSION) N/A 6/19/2023    Procedure: Transesophageal echo (JOSE) intra-procedure log documentation;  Surgeon: Keo Jenkins MD;  Location: [facility] CATH LAB/EP;  Service: Cardiology;  Laterality: N/A;     Family History   Problem Relation  "Name Age of Onset    Aneurysm Mother      Hypertension Mother      Heart disease Mother      Cancer Father      Diabetes Sister 1     Hypertension Sister 1     No Known Problems Brother 1     No Known Problems Son 1      Social History     Tobacco Use    Smoking status: Former     Current packs/day: 0.00     Types: Cigarettes     Quit date: 1999     Years since quittin.0    Smokeless tobacco: Never   Substance Use Topics    Alcohol use: No     Alcohol/week: 0.0 standard drinks of alcohol    Drug use: No        Review of Systems:    Review of Systems   Constitutional: Negative.    HENT: Negative.     Eyes: Negative.    Respiratory: Negative.     Cardiovascular: Negative.    Gastrointestinal: Negative.    Genitourinary: Negative.    Musculoskeletal: Negative.    Skin: Negative.    Neurological: Negative.    Psychiatric/Behavioral: Negative.         OBJECTIVE:     Vital Signs (Most Recent)  Temp: 98.8 °F (37.1 °C) (24 1030)  Pulse: 60 (24 1030)  Resp: 16 (24 1030)  BP: 129/60 (24 1030)  SpO2: 98 % (24 1030)  5' 10" (1.778 m)  81.2 kg (179 lb)     Physical Exam:    Physical Exam  Constitutional:       Appearance: He is well-developed.   HENT:      Head: Normocephalic.   Eyes:      Conjunctiva/sclera: Conjunctivae normal.      Pupils: Pupils are equal, round, and reactive to light.   Cardiovascular:      Rate and Rhythm: Normal rate and regular rhythm.      Heart sounds: Normal heart sounds.   Pulmonary:      Effort: Pulmonary effort is normal.      Breath sounds: Normal breath sounds.   Abdominal:      General: Bowel sounds are normal.      Palpations: Abdomen is soft.   Musculoskeletal:         General: Normal range of motion.      Cervical back: Normal range of motion and neck supple.   Skin:     General: Skin is warm and dry.   Neurological:      Mental Status: He is alert and oriented to person, place, and time.      Deep Tendon Reflexes: Reflexes are normal and symmetric. "       Laboratory      Diagnostic Results:      ASSESSMENT/PLAN:   Crf 5   , dm , hnt  Malfunction catheter right IJ      PLAN:Plan   Removal catheter today.

## 2024-05-20 NOTE — TRANSFER OF CARE
"Anesthesia Transfer of Care Note    Patient: Domingo Gross    Procedure(s) Performed: Procedure(s) (LRB):  REMOVAL, CATHETER, CENTRAL VENOUS, TUNNELED (Right)    Patient location: [facility]    Anesthesia Type: MAC    Transport from OR: Transported from OR on room air with adequate spontaneous ventilation    Post pain: adequate analgesia    Post assessment: no apparent anesthetic complications    Post vital signs: stable    Level of consciousness: awake    Nausea/Vomiting: no nausea/vomiting    Complications: none    Transfer of care protocol was followed      Last vitals: Visit Vitals  /60 (BP Location: Right arm, Patient Position: Lying)   Pulse 60   Temp 37.1 °C (98.8 °F) (Tympanic)   Resp 16   Ht 5' 10" (1.778 m)   Wt 81.2 kg (179 lb)   SpO2 98%   BMI 25.68 kg/m²     "

## 2024-05-20 NOTE — ANESTHESIA PREPROCEDURE EVALUATION
[facility]  Anesthesia Pre-Operative Evaluation        05/20/2024    Procedure(s) (LRB):  REMOVAL, CATHETER, CENTRAL VENOUS, TUNNELED (N/A)      Domingo Gross is a 63 y.o. male w/ a significant PMHx as below who presents for the above procedure.    TTE 6/2023  The left ventricle is normal in size with normal systolic function.  The estimated ejection fraction is 55%.  Normal right ventricular size with normal right ventricular systolic function.  Severe left atrial enlargement.  Mild mitral regurgitation.  The estimated PA systolic pressure is 38 mmHg.  Normal central venous pressure (3 mmHg).  A diastolic pattern consistent with atrial fibrillation observed.      Patient Active Problem List   Diagnosis    HTN (hypertension)    Claudication in peripheral vascular disease    DJD (degenerative joint disease), lumbar    DDD (degenerative disc disease)    Hyperlipidemia    Atherosclerosis of native artery of both lower extremities with intermittent claudication    Venous insufficiency of both lower extremities    Abnormal cardiovascular stress test    Coronary artery disease involving native coronary artery of native heart with angina pectoris with documented spasm    Bilateral carotid artery stenosis    Cardiac arrest    Nephrotic range proteinuria    Nuclear sclerosis, bilateral    CKD (chronic kidney disease) stage 4, GFR 15-29 ml/min    COPD (chronic obstructive pulmonary disease)    Anemia due to stage 4 chronic kidney disease    Persistent atrial fibrillation    Hypothyroidism    Unstable angina    Paroxysmal atrial fibrillation    Acute liver failure without hepatic coma    EBV infection    CMV (cytomegalovirus infection)    Hyponatremia    Uremia    Closed fracture of transverse process of lumbar vertebra    ABLA (acute blood loss anemia)    ESRD (end stage renal disease)    Fall    Membranous nephropathy determined by biopsy    S/P arteriovenous (AV) graft placement     Hemodialysis catheter malfunction       Review of patient's allergies indicates:   Allergen Reactions    Ace inhibitors Rash       Current Inpatient Medications:        Current Facility-Administered Medications on File Prior to Encounter   Medication Dose Route Frequency Provider Last Rate Last Admin    sodium chloride 0.9% infusion   Intravenous Continuous PRN Bushra Cervantes CRNA   New Bag at 11/14/22 1033     Current Outpatient Medications on File Prior to Encounter   Medication Sig Dispense Refill    albuterol (PROVENTIL/VENTOLIN HFA) 90 mcg/actuation inhaler INHALE 2 PUFFS INTO THE LUNGS EVERY 6 HOURS AS NEEDED FOR WHEEZING 54 g 3    albuterol-ipratropium (DUO-NEB) 2.5 mg-0.5 mg/3 mL nebulizer solution Take 3 mLs by nebulization every 6 (six) hours as needed for Wheezing or Shortness of Breath (or cough). Rescue 75 mL 3    amLODIPine (NORVASC) 5 MG tablet Take 1 tablet (5 mg total) by mouth once daily. 90 tablet 3    apixaban (ELIQUIS) 5 mg Tab Take 1 tablet (5 mg total) by mouth 2 (two) times daily. 180 tablet 3    carvediloL (COREG) 25 MG tablet Take 1 tablet (25 mg total) by mouth 2 (two) times daily with meals. 180 tablet 3    clopidogreL (PLAVIX) 75 mg tablet Take 1 tablet (75 mg total) by mouth once daily. 90 tablet 3    coenzyme Q10 100 mg capsule Take 100 mg by mouth once daily.      eplerenone (INSPRA) 50 MG Tab Take 1 tablet (50 mg total) by mouth once daily. 90 tablet 3    epoetin robi-epbx (RETACRIT) 40,000 unit/mL injection Inject 0.1 mLs (4,000 Units total) into the skin every 30 days. 0.1 mL 0    hydrALAZINE (APRESOLINE) 50 MG tablet Take 1 tablet (50 mg total) by mouth every 8 (eight) hours. 270 tablet 3    HYDROcodone-acetaminophen (NORCO) 5-325 mg per tablet Take 1 tablet by mouth every 8 (eight) hours as needed for Pain. (Patient not taking: Reported on 5/16/2024) 21 tablet 0    HYDROcodone-acetaminophen (NORCO) 5-325 mg per tablet Take 1 tablet by mouth every 6 (six) hours as needed for  Pain. (Patient not taking: Reported on 5/16/2024) 24 tablet 0    hydrOXYzine pamoate (VISTARIL) 25 MG Cap Take 1 capsule (25 mg total) by mouth nightly as needed (Insomnia/Anxiety). 30 capsule 0    isosorbide mononitrate (IMDUR) 30 MG 24 hr tablet Take 1 tablet (30 mg total) by mouth once daily. 90 tablet 3    levothyroxine (SYNTHROID) 50 MCG tablet Take 50 mcg by mouth every morning.      levothyroxine (SYNTHROID) 75 MCG tablet Take 1 tablet (75 mcg total) by mouth before breakfast. 30 tablet 11    LIDOcaine (LIDODERM) 5 % Place 1 patch onto the skin daily as needed (back pain). Remove & Discard patch within 12 hours or as directed by MD 30 patch 0    nitroGLYCERIN (NITROSTAT) 0.4 MG SL tablet Place 1 tablet (0.4 mg total) under the tongue every 5 (five) minutes as needed for Chest pain. 90 tablet 3    ranolazine (RANEXA) 500 MG Tb12 Take 1 tablet (500 mg total) by mouth 2 (two) times daily. 180 tablet 3    rosuvastatin (CRESTOR) 40 MG Tab Take 1 tablet (40 mg total) by mouth every evening. 90 tablet 3    sodium bicarbonate 650 MG tablet Take 1 tablet (650 mg total) by mouth once daily. for 30 doses 30 tablet 3       Past Surgical History:   Procedure Laterality Date    ABDOMINAL AORTOGRAPHY N/A 5/10/2019    Procedure: AORTOGRAM-ABDOMINAL;  Surgeon: Keo Jenkins MD;  Location: [facility] CATH LAB/EP;  Service: Cardiology;  Laterality: N/A;  RSFA intervention     AORTOGRAPHY WITH SERIALOGRAPHY N/A 12/3/2021    Procedure: AORTOGRAM, WITH SERIALOGRAPHY;  Surgeon: Keo Jenkins MD;  Location: [facility] CATH LAB/EP;  Service: Cardiology;  Laterality: N/A;    AORTOGRAPHY WITH SERIALOGRAPHY N/A 4/1/2022    Procedure: AORTOGRAM, WITH SERIALOGRAPHY;  Surgeon: Keo Jenkins MD;  Location: [facility] CATH LAB/EP;  Service: Cardiology;  Laterality: N/A;    ATHERECTOMY, CORONARY N/A 4/25/2023    Procedure: Atherectomy-coronary;  Surgeon: Keo Jenkins MD;  Location: [facility] CATH LAB/EP;  Service: Cardiology;  Laterality: N/A;    BONE  MARROW BIOPSY Right 10/12/2021    Procedure: BIOPSY-BONE MARROW;  Surgeon: Naseem Woods MD;  Location: [facility] OR;  Service: Oncology;  Laterality: Right;    CARDIAC CATHETERIZATION      CATARACT EXTRACTION      CATARACT EXTRACTION W/  INTRAOCULAR LENS IMPLANT Right 6/8/2020    Procedure: EXTRACTION, CATARACT, WITH IOL INSERTION;  Surgeon: Tin Light MD;  Location: [facility] OR;  Service: Ophthalmology;  Laterality: Right;    CATARACT EXTRACTION W/  INTRAOCULAR LENS IMPLANT Left 7/2/2020    Procedure: EXTRACTION, CATARACT, WITH IOL INSERTION;  Surgeon: Tin Light MD;  Location: [facility] OR;  Service: Ophthalmology;  Laterality: Left;    COLONOSCOPY N/A 1/6/2022    Procedure: COLONOSCOPY;  Surgeon: Kathe Penaloza MD;  Location: [facility] (2ND FLR);  Service: Endoscopy;  Laterality: N/A;  COVID test at [facility] on 1/3-GT  okay to hold Plavix for 5 days and aspirin per Dr. Becerra  2nd floor due toextensive cardiac history   instructions mailed and my [facility] portal -     CORONARY ANGIOGRAPHY N/A 3/29/2019    Procedure: ANGIOGRAM, CORONARY ARTERY;  Surgeon: Keo Jenkins MD;  Location: [facility] CATH LAB/EP;  Service: Cardiology;  Laterality: N/A;    CORONARY ANGIOGRAPHY Left 10/11/2019    Procedure: ANGIOGRAM, CORONARY ARTERY;  Surgeon: Keo Jenkins MD;  Location: [facility] CATH LAB/EP;  Service: Cardiology;  Laterality: Left;    CORONARY ANGIOGRAPHY N/A 4/10/2023    Procedure: ANGIOGRAM, CORONARY ARTERY;  Surgeon: Keo Jenkins MD;  Location: [facility] CATH LAB/EP;  Service: Cardiology;  Laterality: N/A;    CORONARY ANGIOPLASTY WITH STENT PLACEMENT  03/29/2019    mid and distal RCA    ESOPHAGOGASTRODUODENOSCOPY N/A 1/6/2022    Procedure: EGD (ESOPHAGOGASTRODUODENOSCOPY);  Surgeon: Kathe Penaloza MD;  Location: [facility] (2ND FLR);  Service: Endoscopy;  Laterality: N/A;    EYE SURGERY      INSERTION OF TUNNELED CENTRAL VENOUS HEMODIALYSIS CATHETER N/A 2/9/2024    Procedure: INSERTION, CATHETER, HEMODIALYSIS, DUAL LUMEN;   Surgeon: Wang Mendieta MD;  Location: [facility] OR;  Service: General;  Laterality: N/A;    INSTANTANEOUS WAVE-FREE RATIO (IFR) N/A 4/25/2023    Procedure: Instantaneous Wave-Free Ratio (IFR);  Surgeon: Keo Jenkins MD;  Location: [facility] CATH LAB/EP;  Service: Cardiology;  Laterality: N/A;    INTRAVASCULAR ULTRASOUND, NON-CORONARY  8/12/2022    Procedure: Intravascular Ultrasound, Non-Coronary;  Surgeon: Keo Jenkins MD;  Location: [facility] CATH LAB/EP;  Service: Cardiology;;    IVUS, CORONARY  4/25/2023    Procedure: IVUS, Coronary;  Surgeon: Keo Jenkins MD;  Location: [facility] CATH LAB/EP;  Service: Cardiology;;    IVUS, CORONARY  5/16/2023    Procedure: IVUS, Coronary;  Surgeon: Keo Jenkins MD;  Location: [facility] CATH LAB/EP;  Service: Cardiology;;  CX    LEFT HEART CATHETERIZATION N/A 3/29/2019    Procedure: Left heart cath;  Surgeon: Keo Jenkins MD;  Location: [facility] CATH LAB/EP;  Service: Cardiology;  Laterality: N/A;    LEFT HEART CATHETERIZATION Left 10/8/2019    Procedure: Left heart cath;  Surgeon: Keo Jenkins MD;  Location: [facility] CATH LAB/EP;  Service: Cardiology;  Laterality: Left;    LEFT HEART CATHETERIZATION Left 4/10/2023    Procedure: Left heart cath;  Surgeon: Keo Jenkins MD;  Location: [facility] CATH LAB/EP;  Service: Cardiology;  Laterality: Left;    LEFT HEART CATHETERIZATION Left 4/25/2023    Procedure: Left heart cath;  Surgeon: Keo Jenkins MD;  Location: [facility] CATH LAB/EP;  Service: Cardiology;  Laterality: Left;    LEFT HEART CATHETERIZATION Left 5/16/2023    Procedure: Left heart cath;  Surgeon: Keo Jenkins MD;  Location: [facility] CATH LAB/EP;  Service: Cardiology;  Laterality: Left;    PERCUTANEOUS TRANSLUMINAL ANGIOPLASTY (PTA) OF PERIPHERAL VESSEL N/A 7/12/2019    Procedure: PTA, PERIPHERAL VESSEL;  Surgeon: Keo Jenkins MD;  Location: [facility] CATH LAB/EP;  Service: Cardiology;  Laterality: N/A;    PERCUTANEOUS TRANSLUMINAL ANGIOPLASTY (PTA) OF PERIPHERAL VESSEL Left 5/20/2022     Procedure: PTA, PERIPHERAL VESSEL;  Surgeon: Keo Jenkins MD;  Location: [facility] CATH LAB/EP;  Service: Cardiology;  Laterality: Left;    PERCUTANEOUS TRANSLUMINAL ANGIOPLASTY (PTA) OF PERIPHERAL VESSEL Right 8/12/2022    Procedure: PTA, PERIPHERAL VESSEL;  Surgeon: Keo Jenkins MD;  Location: [facility] CATH LAB/EP;  Service: Cardiology;  Laterality: Right;    PERCUTANEOUS TRANSLUMINAL BALLOON ANGIOPLASTY OF CORONARY ARTERY  4/25/2023    Procedure: Angioplasty-coronary;  Surgeon: Keo Jenkins MD;  Location: [facility] CATH LAB/EP;  Service: Cardiology;;    PLACEMENT OF ARTERIOVENOUS GRAFT Left 4/15/2024    Procedure: INSERTION, GRAFT, ARTERIOVENOUS;  Surgeon: Scott Pascual MD;  Location: [facility] OR;  Service: General;  Laterality: Left;    PTCA, SINGLE VESSEL  5/16/2023    Procedure: PTCA, Single Vessel;  Surgeon: Keo Jenkins MD;  Location: [facility] CATH LAB/EP;  Service: Cardiology;;  CX    REMOVAL OF HEMODIALYSIS CATHETER Right 2/9/2024    Procedure: REMOVAL, CATHETER, HEMODIALYSIS;  Surgeon: Wang Mendieta MD;  Location: [facility] OR;  Service: General;  Laterality: Right;    STENT, DRUG ELUTING, SINGLE VESSEL, CORONARY  4/25/2023    Procedure: Stent, Drug Eluting, Single Vessel, Coronary;  Surgeon: Keo Jenkins MD;  Location: [facility] CATH LAB/EP;  Service: Cardiology;;    STENT, DRUG ELUTING, SINGLE VESSEL, CORONARY  5/16/2023    Procedure: Stent, Drug Eluting, Single Vessel, Coronary;  Surgeon: Keo Jenkins MD;  Location: [facility] CATH LAB/EP;  Service: Cardiology;;  CX    TRANSESOPHAGEAL ECHOCARDIOGRAM WITH POSSIBLE CARDIOVERSION (JOSE W/ POSS CARDIOVERSION) N/A 6/19/2023    Procedure: Transesophageal echo (JOSE) intra-procedure log documentation;  Surgeon: Keo Jenkins MD;  Location: [facility] CATH LAB/EP;  Service: Cardiology;  Laterality: N/A;       Social History     Socioeconomic History    Marital status:    Occupational History     Employer: [employer]   Tobacco Use    Smoking status: Former      "Current packs/day: 0.00     Types: Cigarettes     Quit date: 1999     Years since quittin.0    Smokeless tobacco: Never   Substance and Sexual Activity    Alcohol use: No     Alcohol/week: 0.0 standard drinks of alcohol    Drug use: No    Sexual activity: Yes     Partners: Female     Social Determinants of Health     Financial Resource Strain: Medium Risk (3/2/2024)    Overall Financial Resource Strain (CARDIA)     Difficulty of Paying Living Expenses: Somewhat hard   Food Insecurity: Food Insecurity Present (3/2/2024)    Hunger Vital Sign     Worried About Running Out of Food in the Last Year: Sometimes true     Ran Out of Food in the Last Year: Sometimes true   Transportation Needs: No Transportation Needs (3/2/2024)    PRAPARE - Transportation     Lack of Transportation (Medical): No     Lack of Transportation (Non-Medical): No   Physical Activity: Insufficiently Active (3/2/2024)    Exercise Vital Sign     Days of Exercise per Week: 4 days     Minutes of Exercise per Session: 10 min   Stress: No Stress Concern Present (3/2/2024)    [employer] [place] [employer] - Occupational Stress Questionnaire     Feeling of Stress : Not at all   Housing Stability: High Risk (3/2/2024)    Housing Stability Vital Sign     Unable to Pay for Housing in the Last Year: No     Unstable Housing in the Last Year: Yes       OBJECTIVE:     Vital Signs Range (Last 24H):         CBC:   No results for input(s): "WBC", "RBC", "HGB", "HCT", "PLT", "MCV", "MCH", "MCHC" in the last 72 hours.      CMP:   No results for input(s): "NA", "K", "CL", "CO2", "BUN", "CREATININE", "GLU", "MG", "PHOS", "CALCIUM", "ALBUMIN", "PROT", "ALKPHOS", "ALT", "AST", "BILITOT" in the last 72 hours.      INR:  No results for input(s): "PT", "INR", "PROTIME", "APTT" in the last 72 hours.      Diagnostic Studies: No relevant studies.    EKG: No recent studies available.    2D ECHO:  No results found. However, due to the size of the patient " record, not all encounters were searched. Please check Results Review for a complete set of results.      ASSESSMENT/PLAN:           Pre-op Assessment    I have reviewed the Patient Summary Reports.    I have reviewed the NPO Status.   I have reviewed the Medications.     Review of Systems  Anesthesia Hx:  No problems with previous Anesthesia                Cardiovascular:     Hypertension  Past MI CAD                                        Pulmonary:   COPD                     Renal/:  Chronic Renal Disease                Hepatic/GI:      Liver Disease,            Musculoskeletal:  Arthritis               Neurological:    Neuromuscular Disease,                                   Endocrine:   Hypothyroidism              Physical Exam  General: Alert and Cooperative    Airway:  Mallampati: III / II  Mouth Opening: Normal  TM Distance: Normal  Tongue: Normal  Neck ROM: Normal ROM    Chest/Lungs:  Normal Respiratory Rate    Heart:  Rate: Normal        Anesthesia Plan  Type of Anesthesia, risks & benefits discussed:    Anesthesia Type: MAC  Intra-op Monitoring Plan: Standard ASA Monitors  Post Op Pain Control Plan: multimodal analgesia  Induction:  IV  Airway Plan: Direct, Post-Induction  Informed Consent: Informed consent signed with the Patient and all parties understand the risks and agree with anesthesia plan.  All questions answered.   ASA Score: 3  Day of Surgery Review of History & Physical: H&P Update referred to the surgeon/provider.    Ready For Surgery From Anesthesia Perspective.     .

## 2024-05-21 VITALS
WEIGHT: 179 LBS | DIASTOLIC BLOOD PRESSURE: 63 MMHG | HEIGHT: 70 IN | RESPIRATION RATE: 18 BRPM | BODY MASS INDEX: 25.62 KG/M2 | HEART RATE: 58 BPM | OXYGEN SATURATION: 98 % | TEMPERATURE: 98 F | SYSTOLIC BLOOD PRESSURE: 112 MMHG

## 2024-05-21 NOTE — ANESTHESIA POSTPROCEDURE EVALUATION
Anesthesia Post Evaluation    Patient: Domingo Gross    Procedure(s) Performed: Procedure(s) (LRB):  REMOVAL, CATHETER, CENTRAL VENOUS, TUNNELED (Right)    Final Anesthesia Type: general      Patient location during evaluation: PACU  Patient participation: Yes- Able to Participate  Level of consciousness: awake and alert  Post-procedure vital signs: reviewed and stable  Pain management: adequate  Airway patency: patent    PONV status at discharge: No PONV  Anesthetic complications: no      Cardiovascular status: stable  Respiratory status: room air  Hydration status: euvolemic  Follow-up not needed.              Vitals Value Taken Time   /63 05/20/24 1330   Temp 36.8 °C (98.3 °F) 05/20/24 1330   Pulse 58 05/20/24 1330   Resp 18 05/20/24 1330   SpO2 98 % 05/20/24 1330         No case tracking events are documented in the log.      Pain/Edmundo Score: Edmundo Score: 10 (5/20/2024  1:40 PM)

## 2024-05-27 DIAGNOSIS — E78.2 MIXED HYPERLIPIDEMIA: ICD-10-CM

## 2024-05-27 RX ORDER — HYDROXYZINE PAMOATE 25 MG/1
CAPSULE ORAL
Qty: 30 CAPSULE | Refills: 0 | Status: SHIPPED | OUTPATIENT
Start: 2024-05-27

## 2024-05-27 RX ORDER — ROSUVASTATIN CALCIUM 40 MG/1
40 TABLET, COATED ORAL NIGHTLY
Qty: 90 TABLET | Refills: 3 | Status: SHIPPED | OUTPATIENT
Start: 2024-05-27 | End: 2024-05-28 | Stop reason: SDUPTHER

## 2024-05-28 DIAGNOSIS — E78.2 MIXED HYPERLIPIDEMIA: ICD-10-CM

## 2024-05-28 RX ORDER — ROSUVASTATIN CALCIUM 40 MG/1
40 TABLET, COATED ORAL NIGHTLY
Qty: 90 TABLET | Refills: 3 | Status: SHIPPED | OUTPATIENT
Start: 2024-05-28 | End: 2024-06-07 | Stop reason: SDUPTHER

## 2024-06-06 ENCOUNTER — LAB VISIT (OUTPATIENT)
Dept: LAB | Facility: HOSPITAL | Age: 64
End: 2024-06-06
Attending: INTERNAL MEDICINE
Payer: COMMERCIAL

## 2024-06-06 DIAGNOSIS — Z00.00 ANNUAL PHYSICAL EXAM: ICD-10-CM

## 2024-06-06 LAB
ALBUMIN SERPL BCP-MCNC: 3.1 G/DL (ref 3.5–5.2)
ALP SERPL-CCNC: 57 U/L (ref 55–135)
ALT SERPL W/O P-5'-P-CCNC: 12 U/L (ref 10–44)
ANION GAP SERPL CALC-SCNC: 11 MMOL/L (ref 8–16)
AST SERPL-CCNC: 20 U/L (ref 10–40)
BASOPHILS # BLD AUTO: 0.04 K/UL (ref 0–0.2)
BASOPHILS NFR BLD: 0.8 % (ref 0–1.9)
BILIRUB SERPL-MCNC: 0.5 MG/DL (ref 0.1–1)
BUN SERPL-MCNC: 44 MG/DL (ref 8–23)
CALCIUM SERPL-MCNC: 9.4 MG/DL (ref 8.7–10.5)
CHLORIDE SERPL-SCNC: 101 MMOL/L (ref 95–110)
CHOLEST SERPL-MCNC: 132 MG/DL (ref 120–199)
CHOLEST/HDLC SERPL: 2.3 {RATIO} (ref 2–5)
CO2 SERPL-SCNC: 25 MMOL/L (ref 23–29)
COMPLEXED PSA SERPL-MCNC: 1.1 NG/ML (ref 0–4)
CREAT SERPL-MCNC: 4.7 MG/DL (ref 0.5–1.4)
DIFFERENTIAL METHOD BLD: ABNORMAL
EOSINOPHIL # BLD AUTO: 0.2 K/UL (ref 0–0.5)
EOSINOPHIL NFR BLD: 4.3 % (ref 0–8)
ERYTHROCYTE [DISTWIDTH] IN BLOOD BY AUTOMATED COUNT: 14.3 % (ref 11.5–14.5)
EST. GFR  (NO RACE VARIABLE): 13.3 ML/MIN/1.73 M^2
ESTIMATED AVG GLUCOSE: 85 MG/DL (ref 68–131)
GLUCOSE SERPL-MCNC: 98 MG/DL (ref 70–110)
HBA1C MFR BLD: 4.6 % (ref 4–5.6)
HCT VFR BLD AUTO: 26.2 % (ref 40–54)
HDLC SERPL-MCNC: 58 MG/DL (ref 40–75)
HDLC SERPL: 43.9 % (ref 20–50)
HGB BLD-MCNC: 8.1 G/DL (ref 14–18)
IMM GRANULOCYTES # BLD AUTO: 0.01 K/UL (ref 0–0.04)
IMM GRANULOCYTES NFR BLD AUTO: 0.2 % (ref 0–0.5)
LDLC SERPL CALC-MCNC: 56 MG/DL (ref 63–159)
LYMPHOCYTES # BLD AUTO: 1 K/UL (ref 1–4.8)
LYMPHOCYTES NFR BLD: 19.7 % (ref 18–48)
MCH RBC QN AUTO: 27.8 PG (ref 27–31)
MCHC RBC AUTO-ENTMCNC: 30.9 G/DL (ref 32–36)
MCV RBC AUTO: 90 FL (ref 82–98)
MONOCYTES # BLD AUTO: 0.6 K/UL (ref 0.3–1)
MONOCYTES NFR BLD: 13 % (ref 4–15)
NEUTROPHILS # BLD AUTO: 3.1 K/UL (ref 1.8–7.7)
NEUTROPHILS NFR BLD: 62 % (ref 38–73)
NONHDLC SERPL-MCNC: 74 MG/DL
NRBC BLD-RTO: 0 /100 WBC
PLATELET # BLD AUTO: 161 K/UL (ref 150–450)
PMV BLD AUTO: 11.2 FL (ref 9.2–12.9)
POTASSIUM SERPL-SCNC: 3.5 MMOL/L (ref 3.5–5.1)
PROT SERPL-MCNC: 6.9 G/DL (ref 6–8.4)
RBC # BLD AUTO: 2.91 M/UL (ref 4.6–6.2)
SODIUM SERPL-SCNC: 137 MMOL/L (ref 136–145)
TRIGL SERPL-MCNC: 90 MG/DL (ref 30–150)
TSH SERPL DL<=0.005 MIU/L-ACNC: 1.11 UIU/ML (ref 0.4–4)
WBC # BLD AUTO: 4.92 K/UL (ref 3.9–12.7)

## 2024-06-06 PROCEDURE — 84443 ASSAY THYROID STIM HORMONE: CPT | Performed by: INTERNAL MEDICINE

## 2024-06-06 PROCEDURE — 36415 COLL VENOUS BLD VENIPUNCTURE: CPT | Mod: PO | Performed by: INTERNAL MEDICINE

## 2024-06-06 PROCEDURE — 80053 COMPREHEN METABOLIC PANEL: CPT | Performed by: INTERNAL MEDICINE

## 2024-06-06 PROCEDURE — 80061 LIPID PANEL: CPT | Performed by: INTERNAL MEDICINE

## 2024-06-06 PROCEDURE — 85025 COMPLETE CBC W/AUTO DIFF WBC: CPT | Performed by: INTERNAL MEDICINE

## 2024-06-06 PROCEDURE — 84153 ASSAY OF PSA TOTAL: CPT | Performed by: INTERNAL MEDICINE

## 2024-06-06 PROCEDURE — 83036 HEMOGLOBIN GLYCOSYLATED A1C: CPT | Performed by: INTERNAL MEDICINE

## 2024-06-07 ENCOUNTER — OFFICE VISIT (OUTPATIENT)
Dept: CARDIOLOGY | Facility: CLINIC | Age: 64
End: 2024-06-07
Payer: COMMERCIAL

## 2024-06-07 VITALS
OXYGEN SATURATION: 98 % | WEIGHT: 180.44 LBS | DIASTOLIC BLOOD PRESSURE: 57 MMHG | HEIGHT: 70 IN | HEART RATE: 67 BPM | SYSTOLIC BLOOD PRESSURE: 131 MMHG | BODY MASS INDEX: 25.83 KG/M2

## 2024-06-07 DIAGNOSIS — I48.19 PERSISTENT ATRIAL FIBRILLATION: ICD-10-CM

## 2024-06-07 DIAGNOSIS — I65.23 BILATERAL CAROTID ARTERY STENOSIS: ICD-10-CM

## 2024-06-07 DIAGNOSIS — I10 ESSENTIAL HYPERTENSION: ICD-10-CM

## 2024-06-07 DIAGNOSIS — I70.213 ATHEROSCLEROSIS OF NATIVE ARTERY OF BOTH LOWER EXTREMITIES WITH INTERMITTENT CLAUDICATION: ICD-10-CM

## 2024-06-07 DIAGNOSIS — I10 PRIMARY HYPERTENSION: ICD-10-CM

## 2024-06-07 DIAGNOSIS — R07.9 CHEST PAIN, UNSPECIFIED TYPE: ICD-10-CM

## 2024-06-07 DIAGNOSIS — I25.111 CORONARY ARTERY DISEASE INVOLVING NATIVE CORONARY ARTERY OF NATIVE HEART WITH ANGINA PECTORIS WITH DOCUMENTED SPASM: ICD-10-CM

## 2024-06-07 DIAGNOSIS — I46.9 CARDIAC ARREST: ICD-10-CM

## 2024-06-07 DIAGNOSIS — E78.2 MIXED HYPERLIPIDEMIA: ICD-10-CM

## 2024-06-07 DIAGNOSIS — I25.10 ATHEROSCLEROSIS OF NATIVE CORONARY ARTERY OF NATIVE HEART WITHOUT ANGINA PECTORIS: ICD-10-CM

## 2024-06-07 DIAGNOSIS — I73.9 CLAUDICATION IN PERIPHERAL VASCULAR DISEASE: Primary | ICD-10-CM

## 2024-06-07 PROCEDURE — 3008F BODY MASS INDEX DOCD: CPT | Mod: CPTII,S$GLB,,

## 2024-06-07 PROCEDURE — 3075F SYST BP GE 130 - 139MM HG: CPT | Mod: CPTII,S$GLB,,

## 2024-06-07 PROCEDURE — 3078F DIAST BP <80 MM HG: CPT | Mod: CPTII,S$GLB,,

## 2024-06-07 PROCEDURE — 3044F HG A1C LEVEL LT 7.0%: CPT | Mod: CPTII,S$GLB,,

## 2024-06-07 PROCEDURE — 99214 OFFICE O/P EST MOD 30 MIN: CPT | Mod: S$GLB,,,

## 2024-06-07 PROCEDURE — 3066F NEPHROPATHY DOC TX: CPT | Mod: CPTII,S$GLB,,

## 2024-06-07 PROCEDURE — 99999 PR PBB SHADOW E&M-EST. PATIENT-LVL IV: CPT | Mod: PBBFAC,,,

## 2024-06-07 PROCEDURE — 3062F POS MACROALBUMINURIA REV: CPT | Mod: CPTII,S$GLB,,

## 2024-06-07 PROCEDURE — 1159F MED LIST DOCD IN RCRD: CPT | Mod: CPTII,S$GLB,,

## 2024-06-07 RX ORDER — AMLODIPINE BESYLATE 5 MG/1
5 TABLET ORAL DAILY
Qty: 90 TABLET | Refills: 3 | Status: SHIPPED | OUTPATIENT
Start: 2024-06-07 | End: 2025-06-07

## 2024-06-07 RX ORDER — ROSUVASTATIN CALCIUM 40 MG/1
40 TABLET, COATED ORAL NIGHTLY
Qty: 90 TABLET | Refills: 3 | Status: SHIPPED | OUTPATIENT
Start: 2024-06-07

## 2024-06-07 RX ORDER — RANOLAZINE 500 MG/1
500 TABLET, EXTENDED RELEASE ORAL 2 TIMES DAILY
Qty: 180 TABLET | Refills: 3 | Status: SHIPPED | OUTPATIENT
Start: 2024-06-07 | End: 2025-06-07

## 2024-06-07 RX ORDER — HYDRALAZINE HYDROCHLORIDE 50 MG/1
50 TABLET, FILM COATED ORAL EVERY 8 HOURS
Qty: 270 TABLET | Refills: 3 | Status: SHIPPED | OUTPATIENT
Start: 2024-06-07 | End: 2025-06-07

## 2024-06-07 RX ORDER — ISOSORBIDE MONONITRATE 30 MG/1
30 TABLET, EXTENDED RELEASE ORAL DAILY
Qty: 90 TABLET | Refills: 3 | Status: SHIPPED | OUTPATIENT
Start: 2024-06-07 | End: 2025-06-07

## 2024-06-07 RX ORDER — CARVEDILOL 25 MG/1
25 TABLET ORAL 2 TIMES DAILY WITH MEALS
Qty: 180 TABLET | Refills: 3 | Status: SHIPPED | OUTPATIENT
Start: 2024-06-07 | End: 2025-06-07

## 2024-06-07 RX ORDER — CLOPIDOGREL BISULFATE 75 MG/1
75 TABLET ORAL DAILY
Qty: 90 TABLET | Refills: 3 | Status: SHIPPED | OUTPATIENT
Start: 2024-06-07

## 2024-06-07 NOTE — PROGRESS NOTES
Cardiology Clinic note    Subjective:   Patient ID:  Domingo Gross is a 63 y.o. male who presents for follow-up of A. Fib, CAD, PAD    HPI:   Mr. Gross is a pleasant 64 yo gentleman with hx of HTN, CKD IV, PAD with multiple intervention, CAD (mid LAD/prox RCA PCI 3/2019 and prox LCA in 10/2019 following out of hospital cardiac arrest), cardiac arrest in setting of prox LCX occlusion treated with PCI with normal LV function.        3/2023: Mr. Gross returned for follow-up. Recently spoke with him on the phone regarding confusion on whether he was on eliquis or not. His wife confirmed he was on eliquis and aspirin. He has been having angina. Dr. Jenkins has a stress test ordered. He was initiated on Imdur. His Hgb has been stable (9-10).     5/2023: Underwent LCX PCI with Dr. Jenkins.     6/2023: Underwent JOSE/DCCV with Dr. Jenkins. Initiated on amiodarone.     03/01/24: Mr Gross reports chest pain similar to his previous events where he required stents.     6/7/2024: Previous patient of Dr. Salazar as above, initial visit for me. 64 yo M with hx of HTN, CKD IV, PAD with multiple intervention, CAD (mid LAD/prox RCA PCI 3/2019 and prox LCA in 10/2019 following out of hospital cardiac arrest), cardiac arrest in setting of prox LCX occlusion treated with PCI with normal LV function presents to clinic for routine F/U. Seen in clinic, reports overall doing well. Ongoing fatigue since started HD but improving. Nuc stress/Echo obtained for some CP last visit as below. Patient denies CP, SOB/SHIRLEY, orthopnea, PND, syncope, palpitations, LE edema. Denies worsening claudication, no reported bleeding problems on Plavix, DOAC. Reports compliance and tolerance with all medications.    Long conversation about risks vs benefits of staying on Plavix vs transitioning to aspirin. Patient is on Eliquis for A. Fib which increases risk of bleeding. Given multitude of arterial interventions (peripheral stents, 2x coronary  stents), will rec staying on Plavix for now. Patient understands bleeding risks and agrees with plan.      Cardiac Hx  --------------------------------------------------------------     Nuclear stress/ MPI 03/2024  Impression:    The ECG portion of the study is negative for ischemia.    The patient reported no chest pain during the stress test.    There were no arrhythmias during stress.    The nuclear portion of this study will be reported separately.  1. No convincing scintigraphic evidence for ischemia or infarct.  2. LV ejection fraction of 52 % with evidence of LV dilatation.    Echo 03/2024    Left Ventricle: The left ventricle is mildly dilated. There is eccentric hypertrophy. Septal motion is consistent with bundle branch block. There is normal systolic function with a visually estimated ejection fraction of 55 - 60%. Biplane (2D) method of discs ejection fraction is 50%. Grade III diastolic dysfunction. Elevated left ventricular filling pressure.    Right Ventricle: Normal right ventricular cavity size. Systolic function is normal. TAPSE is 2.30 cm.    Left Atrium: Left atrium is mildly dilated. The left atrium volume index is 36.9 mL/m2.    Mitral Valve: There is mild regurgitation.    Tricuspid Valve: There is mild regurgitation.    Pulmonic Valve: There is mild regurgitation.    Pulmonary Artery: The estimated pulmonary artery systolic pressure is 33 mmHg.    IVC/SVC: Normal venous pressure at 3 mmHg.    Past Medical History:   Diagnosis Date    Allergy     Anemia     Anticoagulant long-term use     Arthritis     CKD (chronic kidney disease) stage 4, GFR 15-29 ml/min     COPD (chronic obstructive pulmonary disease) 08/20/2021    Coronary artery disease     Heart attack 10/04/2019    Hematuria     Hemothorax     Hyperlipidemia     Hypertension     Hyperuricemia     Hypocalcemia     Hypokalemia     Hypophosphatemia     Hypothyroidism 3/2/2023    PAD (peripheral artery disease)      Proteinuria     Vitamin D deficiency           Patient Active Problem List    Diagnosis Date Noted    Hemodialysis catheter dysfunction 05/16/2024    S/P arteriovenous (AV) graft placement 04/23/2024    Membranous nephropathy determined by biopsy 02/20/2024    Closed fracture of transverse process of lumbar vertebra 02/16/2024    ABLA (acute blood loss anemia) 02/16/2024    ESRD (end stage renal disease) 02/16/2024    Fall 02/16/2024    Uremia 02/03/2024    EBV infection 01/31/2024    CMV (cytomegalovirus infection) 01/31/2024    Hyponatremia 01/31/2024    Acute liver failure without hepatic coma 01/27/2024    Paroxysmal atrial fibrillation 08/24/2023    Hypothyroidism 03/02/2023    Unstable angina 03/02/2023     I have messaged his cardiologist and electrophysiologist about this. Currently awaiting response       Persistent atrial fibrillation 11/12/2022    Anemia due to stage 4 chronic kidney disease 12/15/2021    CKD (chronic kidney disease) stage 4, GFR 15-29 ml/min 08/20/2021    COPD (chronic obstructive pulmonary disease) 08/20/2021    Nuclear sclerosis, bilateral 06/08/2020    Nephrotic range proteinuria 04/06/2020    Cardiac arrest 10/04/2019    Bilateral carotid artery stenosis     Coronary artery disease involving native coronary artery of native heart with angina pectoris with documented spasm 03/29/2019         3/29/2019    S/p LHC via R radial           LM, LAD, and LCX are patent with luminal irregularities  RCA mid 95% calcified lesion  Normal EF with LVEDP 8 mmHg           PCI of mid LAD with 2.5 x 30 and 2.5 x 15 mm Resolute REZA  PCI of proximal RCA to treat guide dissection with 3.5 x 22 Resolute REZA        10/11/2019 for cardiac arrest        S/p staged LCX PCI                    L CFA access              IVUS guided PCI              3.0 x 15 mm Resolute REZA post dilated with 3.5 NC balloon           Abnormal cardiovascular stress test 02/27/2019         Nuclear  stress test 2/2019     + ECG with 2 mm ST depression   No wall motion abnormalities   Test stopped at 6 minutes, 7 METs, 86% predicted heart rate   Stopped because of R leg claudication + ECG changes            Venous insufficiency of both lower extremities 06/04/2018    Atherosclerosis of native artery of both lower extremities with intermittent claudication 03/02/2018    Hyperlipidemia 06/12/2015    DJD (degenerative joint disease), lumbar 05/27/2015    DDD (degenerative disc disease) 05/27/2015    Claudication in peripheral vascular disease 06/13/2014    HTN (hypertension) 04/29/2013       Patient's Medications   New Prescriptions    No medications on file   Previous Medications    ALBUTEROL (PROVENTIL/VENTOLIN HFA) 90 MCG/ACTUATION INHALER    INHALE 2 PUFFS INTO THE LUNGS EVERY 6 HOURS AS NEEDED FOR WHEEZING    ALBUTEROL-IPRATROPIUM (DUO-NEB) 2.5 MG-0.5 MG/3 ML NEBULIZER SOLUTION    Take 3 mLs by nebulization every 6 (six) hours as needed for Wheezing or Shortness of Breath (or cough). Rescue    COENZYME Q10 100 MG CAPSULE    Take 100 mg by mouth once daily.    EPLERENONE (INSPRA) 50 MG TAB    Take 1 tablet (50 mg total) by mouth once daily.    EPOETIN LANI-EPBX (RETACRIT) 40,000 UNIT/ML INJECTION    Inject 0.1 mLs (4,000 Units total) into the skin every 30 days.    HYDROCODONE-ACETAMINOPHEN (NORCO) 5-325 MG PER TABLET    Take 1 tablet by mouth every 6 (six) hours as needed for Pain.    HYDROCODONE-ACETAMINOPHEN (NORCO) 5-325 MG PER TABLET    Take 1 tablet by mouth every 6 (six) hours as needed for Pain.    HYDROXYZINE PAMOATE (VISTARIL) 25 MG CAP    TAKE 1 CAPSULE(25 MG) BY MOUTH EVERY NIGHT AS NEEDED FOR INSOMNIA OR ANXIETY    LEVOTHYROXINE (SYNTHROID) 50 MCG TABLET    Take 50 mcg by mouth every morning.    LEVOTHYROXINE (SYNTHROID) 75 MCG TABLET    Take 1 tablet (75 mcg total) by mouth before breakfast.    LIDOCAINE (LIDODERM) 5 %    Place 1 patch onto the skin daily as needed (back pain). Remove &  Discard patch within 12 hours or as directed by MD    NITROGLYCERIN (NITROSTAT) 0.4 MG SL TABLET    Place 1 tablet (0.4 mg total) under the tongue every 5 (five) minutes as needed for Chest pain.    SODIUM BICARBONATE 650 MG TABLET    Take 1 tablet (650 mg total) by mouth once daily. for 30 doses   Modified Medications    Modified Medication Previous Medication    AMLODIPINE (NORVASC) 5 MG TABLET amLODIPine (NORVASC) 5 MG tablet       Take 1 tablet (5 mg total) by mouth once daily.    Take 1 tablet (5 mg total) by mouth once daily.    APIXABAN (ELIQUIS) 5 MG TAB apixaban (ELIQUIS) 5 mg Tab       Take 1 tablet (5 mg total) by mouth 2 (two) times daily.    Take 1 tablet (5 mg total) by mouth 2 (two) times daily.    CARVEDILOL (COREG) 25 MG TABLET carvediloL (COREG) 25 MG tablet       Take 1 tablet (25 mg total) by mouth 2 (two) times daily with meals.    Take 1 tablet (25 mg total) by mouth 2 (two) times daily with meals.    CLOPIDOGREL (PLAVIX) 75 MG TABLET clopidogreL (PLAVIX) 75 mg tablet       Take 1 tablet (75 mg total) by mouth once daily.    Take 1 tablet (75 mg total) by mouth once daily.    HYDRALAZINE (APRESOLINE) 50 MG TABLET hydrALAZINE (APRESOLINE) 50 MG tablet       Take 1 tablet (50 mg total) by mouth every 8 (eight) hours.    Take 1 tablet (50 mg total) by mouth every 8 (eight) hours.    ISOSORBIDE MONONITRATE (IMDUR) 30 MG 24 HR TABLET isosorbide mononitrate (IMDUR) 30 MG 24 hr tablet       Take 1 tablet (30 mg total) by mouth once daily.    Take 1 tablet (30 mg total) by mouth once daily.    RANOLAZINE (RANEXA) 500 MG TB12 ranolazine (RANEXA) 500 MG Tb12       Take 1 tablet (500 mg total) by mouth 2 (two) times daily.    Take 1 tablet (500 mg total) by mouth 2 (two) times daily.    ROSUVASTATIN (CRESTOR) 40 MG TAB rosuvastatin (CRESTOR) 40 MG Tab       Take 1 tablet (40 mg total) by mouth every evening.    Take 1 tablet (40 mg total) by mouth every evening.   Discontinued Medications    No  medications on file        Review of Systems   Constitutional: Positive for malaise/fatigue. Negative for chills, decreased appetite, diaphoresis, weight gain and weight loss.   Cardiovascular:  Negative for chest pain, claudication, dyspnea on exertion, irregular heartbeat, leg swelling, near-syncope, orthopnea, palpitations, paroxysmal nocturnal dyspnea and syncope.   Respiratory:  Negative for cough, hemoptysis, shortness of breath and snoring.    Gastrointestinal:  Negative for bloating, abdominal pain, nausea and vomiting.   Neurological:  Negative for light-headedness and weakness.     Family History   Problem Relation Name Age of Onset    Aneurysm Mother      Hypertension Mother      Heart disease Mother      Cancer Father      Diabetes Sister 1     Hypertension Sister 1     No Known Problems Brother 1     No Known Problems Son 1        Social History     Socioeconomic History    Marital status:    Occupational History     Employer: [employer]   Tobacco Use    Smoking status: Former     Current packs/day: 0.00     Types: Cigarettes     Quit date: 1999     Years since quittin.1    Smokeless tobacco: Never   Substance and Sexual Activity    Alcohol use: No     Alcohol/week: 0.0 standard drinks of alcohol    Drug use: No    Sexual activity: Yes     Partners: Female     Social Determinants of Health     Financial Resource Strain: Medium Risk (3/2/2024)    Overall Financial Resource Strain (CARDIA)     Difficulty of Paying Living Expenses: Somewhat hard   Food Insecurity: Food Insecurity Present (3/2/2024)    Hunger Vital Sign     Worried About Running Out of Food in the Last Year: Sometimes true     Ran Out of Food in the Last Year: Sometimes true   Transportation Needs: No Transportation Needs (3/2/2024)    PRAPARE - Transportation     Lack of Transportation (Medical): No     Lack of Transportation (Non-Medical): No   Physical Activity: Insufficiently Active (3/2/2024)     "Exercise Vital Sign     Days of Exercise per Week: 4 days     Minutes of Exercise per Session: 10 min   Stress: No Stress Concern Present (3/2/2024)    [employer] [place] [employer] - Occupational Stress Questionnaire     Feeling of Stress : Not at all   Housing Stability: High Risk (3/2/2024)    Housing Stability Vital Sign     Unable to Pay for Housing in the Last Year: No     Unstable Housing in the Last Year: Yes            Objective:   Vitals  Vitals:    06/07/24 1355   BP: (!) 131/57   Pulse: 67   SpO2: 98%   Weight: 81.8 kg (180 lb 7.1 oz)   Height: 5' 10" (1.778 m)          Physical Exam  Vitals and nursing note reviewed.   Constitutional:       Appearance: Normal appearance.   Cardiovascular:      Rate and Rhythm: Normal rate and regular rhythm.      Heart sounds: No murmur heard.     No gallop.   Pulmonary:      Effort: Pulmonary effort is normal.      Breath sounds: Normal breath sounds.   Abdominal:      General: Bowel sounds are normal. There is no distension.      Palpations: Abdomen is soft.      Tenderness: There is no abdominal tenderness.   Skin:     General: Skin is warm and dry.   Neurological:      Mental Status: He is alert and oriented to person, place, and time.       Lab Results    Lab Results   Component Value Date    WBC 4.92 06/06/2024    HGB 8.1 (L) 06/06/2024    HCT 26.2 (L) 06/06/2024    MCV 90 06/06/2024       Lab Results   Component Value Date     06/06/2024    INR 1.1 02/08/2024       Lab Results   Component Value Date    K 3.5 06/06/2024    MG 1.6 03/14/2024    BUN 44 (H) 06/06/2024    CREATININE 4.7 (H) 06/06/2024       Lab Results   Component Value Date    GLU 98 06/06/2024    HGBA1C 4.6 06/06/2024       Lab Results   Component Value Date    AST 20 06/06/2024    ALT 12 06/06/2024    ALBUMIN 3.1 (L) 06/06/2024    PROT 6.9 06/06/2024       Lab Results   Component Value Date    CHOL 132 06/06/2024    HDL 58 06/06/2024    LDLCALC 56.0 (L) 06/06/2024    TRIG 90 " 06/06/2024       Lab Results   Component Value Date    CRP 81.3 (H) 02/01/2024     (H) 11/12/2022         Assessment:     1. Claudication in peripheral vascular disease    2. Primary hypertension    3. Persistent atrial fibrillation [I48.19]    4. Atherosclerosis of native coronary artery of native heart without angina pectoris    5. Essential hypertension    6. Chest pain, unspecified type    7. Mixed hyperlipidemia    8. Atherosclerosis of native artery of both lower extremities with intermittent claudication    9. Coronary artery disease involving native coronary artery of native heart with angina pectoris with documented spasm    10. Bilateral carotid artery stenosis    11. Cardiac arrest        Plan:     CAD  -stable, Nuc stress/Echo 03/2024 unremarkable  -have room to up-titrate ranexa with any recurrent CP  -continue GDMT as above  -will opt to continue monotherapy with Plavix for now  -tight lipid control    2. A. Fib  -no noted recurrence   -continue BB, DOAC    3. Carotid stenosis  -continue plavix, statin  -tight lipid control    4. Diastolic dysfunction  -continue medication regimen as above  -volume control per HD    5. ESRD  -continue F/U with nephro  -compliant with HD    6. HLD  -goal LDLc < 70, at goal  -continue statin        No orders of the defined types were placed in this encounter.      He expressed verbal understanding and agreed with the plan    Follow up in 6m    Pertinent cardiac images and EKG reviewed independently.    Continue with current medical plan and lifestyle changes.  Return sooner for concerns or questions. If symptoms persist go to the ED.  I have reviewed all pertinent data including patient's medical history in detail and updated the computerized patient record.     Counseling included discussion regarding imaging findings, diagnosis, possibilities, treatment options, risks and benefits.    Thank you for the opportunity to care for this patient. Will be available for  questions if needed.      Signed:  Vishnu Bolton DNP  06/07/2024

## 2024-06-07 NOTE — PROGRESS NOTES
Subjective:   @Patient ID:  Domingo Gross is a 63 y.o. male who presents for follow-up of No chief complaint on file.      HPI:   Mr. Gross is a pleasant 64 yo gentleman with hx of HTN, CKD IV, PAD with multiple intervention, CAD (mid LAD/prox RCA PCI 3/2019 and prox LCA in 10/2019 following out of hospital cardiac arrest), cardiac arrest in setting of prox LCX occlusion treated with PCI with normal LV function.      3/2023: Mr. Gross returned for follow-up. Recently spoke with him on the phone regarding confusion on whether he was on eliquis or not. His wife confirmed he was on eliquis and aspirin. He has been having angina. Dr. Jenkins has a stress test ordered. He was initiated on Imdur. His Hgb has been stable (9-10).     5/2023: Underwent LCX PCI with Dr. Jenkins.     6/2023: Underwent JOSE/DCCV with Dr. Jenkins. Initiated on amiodarone.    03/01/24: Mr Gross reports chest pain similar to his previous events where he required stents.       Nuclear stress   Impression:     1. No convincing scintigraphic evidence for ischemia or infarct.  2. LV ejection fraction of 52 % with evidence of LV dilatation.  Patient Active Problem List    Diagnosis Date Noted    Hemodialysis catheter dysfunction 05/16/2024    S/P arteriovenous (AV) graft placement 04/23/2024    Membranous nephropathy determined by biopsy 02/20/2024    Closed fracture of transverse process of lumbar vertebra 02/16/2024    ABLA (acute blood loss anemia) 02/16/2024    ESRD (end stage renal disease) 02/16/2024    Fall 02/16/2024    Uremia 02/03/2024    EBV infection 01/31/2024    CMV (cytomegalovirus infection) 01/31/2024    Hyponatremia 01/31/2024    Acute liver failure without hepatic coma 01/27/2024    Paroxysmal atrial fibrillation 08/24/2023    Hypothyroidism 03/02/2023    Unstable angina 03/02/2023     I have messaged his cardiologist and electrophysiologist about this. Currently awaiting response       Persistent atrial fibrillation  11/12/2022    Anemia due to stage 4 chronic kidney disease 12/15/2021    CKD (chronic kidney disease) stage 4, GFR 15-29 ml/min 08/20/2021    COPD (chronic obstructive pulmonary disease) 08/20/2021    Nuclear sclerosis, bilateral 06/08/2020    Nephrotic range proteinuria 04/06/2020    Cardiac arrest 10/04/2019    Bilateral carotid artery stenosis     Coronary artery disease involving native coronary artery of native heart with angina pectoris with documented spasm 03/29/2019         3/29/2019    S/p LHC via R radial           LM, LAD, and LCX are patent with luminal irregularities  RCA mid 95% calcified lesion  Normal EF with LVEDP 8 mmHg           PCI of mid LAD with 2.5 x 30 and 2.5 x 15 mm Resolute REZA  PCI of proximal RCA to treat guide dissection with 3.5 x 22 Resolute REZA        10/11/2019 for cardiac arrest        S/p staged LCX PCI                    L CFA access              IVUS guided PCI              3.0 x 15 mm Resolute REZA post dilated with 3.5 NC balloon           Abnormal cardiovascular stress test 02/27/2019         Nuclear stress test 2/2019     + ECG with 2 mm ST depression   No wall motion abnormalities   Test stopped at 6 minutes, 7 METs, 86% predicted heart rate   Stopped because of R leg claudication + ECG changes            Venous insufficiency of both lower extremities 06/04/2018    Atherosclerosis of native artery of both lower extremities with intermittent claudication 03/02/2018    Hyperlipidemia 06/12/2015    DJD (degenerative joint disease), lumbar 05/27/2015    DDD (degenerative disc disease) 05/27/2015    Claudication in peripheral vascular disease 06/13/2014    HTN (hypertension) 04/29/2013                    LABS  CBC  @LABRCNTIP(WBC:3,RBC:3,HGB:3,HCT:3,PLT:3,MCV:3,MCH:3,MCHC:3)@  BMP  @LABRCNTIP(NA:3,K:3,CO2:3,CL:3,BUN:3,Creatinine:3,GLU:3,GFR:3)@    POCT-Glucose  No results found for:  ""POCTGLUCOSE"    @LABRCNTIP(Calcium:3,MG:3,PHOS:3)@  LFT  @LABRCNTIP(PROT:3,Albumin:3,,Bilitot:3,AST:3,Alkphos:3,ALT:3)@  GFR     COAGS  @LABRCNTIP(PT:3,INR:3,APTT:3)@  CE  @LABRCNTIP(troponini:3,cktotal:3,ckmb:3)@  ABGs  @[id number](PH,PCO2,PO2,HCO3,POCSATURATED,BE)@  BNP  @LABRCNTIP(BNP:3)@    LAST HbA1c  Lab Results   Component Value Date    HGBA1C 4.6 06/06/2024       Lipid panel  Lab Results   Component Value Date    CHOL 132 06/06/2024    CHOL 144 08/30/2023    CHOL 144 02/27/2023     Lab Results   Component Value Date    HDL 58 06/06/2024    HDL 53 08/30/2023    HDL 55 02/27/2023     Lab Results   Component Value Date    LDLCALC 56.0 (L) 06/06/2024    LDLCALC 63.8 08/30/2023    LDLCALC 75.8 02/27/2023     Lab Results   Component Value Date    TRIG 90 06/06/2024    TRIG 136 08/30/2023    TRIG 66 02/27/2023     Lab Results   Component Value Date    CHOLHDL 43.9 06/06/2024    CHOLHDL 36.8 08/30/2023    CHOLHDL 38.2 02/27/2023            Review of Systems   Cardiovascular:  Positive for chest pain.   All other systems reviewed and are negative.      Objective:   General: No acute stress  HENT: EOM intact, PERRL, oropharynx clear, mucous membranes clear and moist   Pulmonary: CTAB  Cardiovascular: regular rhythm, nl S1/S2, no murmurs, rubs or gallops  Abdomen: soft, non-tender, no distended no splenomegaly or hepatomegaly   Skin: warm, dry, no erythema, no rashes    Extremities: periph pulses intact, no cyanosis or edema   Neuro: a/ox4, clear speech, follows commands, no weakness or focal neurologic  deficit   Psych: mood and behavior normal       Assessment:     No diagnosis found.      Plan:     CAD with multiple interventions and know coronary anatomy. Patient complaint of chest pain which is concerning for him given significant anatomy. Will order an echo and stress test. Continue current regimen.   HTN- continue current medication. Will uptitrate as tolerated.   Afib per EP- continue eliquis    Continue with " current medical plan and lifestyle changes.  Return sooner for concerns or questions. If symptoms persist go to the ED  I have reviewed all pertinent data on this patient       I have reviewed the patient's medical history in detail and updated the computerized patient record.    No orders of the defined types were placed in this encounter.      Follow up as scheduled. Return sooner for concerns or questions            He expressed verbal understanding and agreed with the plan        Patient's Medications   New Prescriptions    No medications on file   Previous Medications    ALBUTEROL (PROVENTIL/VENTOLIN HFA) 90 MCG/ACTUATION INHALER    INHALE 2 PUFFS INTO THE LUNGS EVERY 6 HOURS AS NEEDED FOR WHEEZING    ALBUTEROL-IPRATROPIUM (DUO-NEB) 2.5 MG-0.5 MG/3 ML NEBULIZER SOLUTION    Take 3 mLs by nebulization every 6 (six) hours as needed for Wheezing or Shortness of Breath (or cough). Rescue    AMLODIPINE (NORVASC) 5 MG TABLET    Take 1 tablet (5 mg total) by mouth once daily.    APIXABAN (ELIQUIS) 5 MG TAB    Take 1 tablet (5 mg total) by mouth 2 (two) times daily.    CARVEDILOL (COREG) 25 MG TABLET    Take 1 tablet (25 mg total) by mouth 2 (two) times daily with meals.    CLOPIDOGREL (PLAVIX) 75 MG TABLET    Take 1 tablet (75 mg total) by mouth once daily.    COENZYME Q10 100 MG CAPSULE    Take 100 mg by mouth once daily.    EPLERENONE (INSPRA) 50 MG TAB    Take 1 tablet (50 mg total) by mouth once daily.    EPOETIN LANI-EPBX (RETACRIT) 40,000 UNIT/ML INJECTION    Inject 0.1 mLs (4,000 Units total) into the skin every 30 days.    HYDRALAZINE (APRESOLINE) 50 MG TABLET    Take 1 tablet (50 mg total) by mouth every 8 (eight) hours.    HYDROCODONE-ACETAMINOPHEN (NORCO) 5-325 MG PER TABLET    Take 1 tablet by mouth every 6 (six) hours as needed for Pain.    HYDROCODONE-ACETAMINOPHEN (NORCO) 5-325 MG PER TABLET    Take 1 tablet by mouth every 6 (six) hours as needed for Pain.    HYDROXYZINE PAMOATE (VISTARIL) 25 MG CAP     TAKE 1 CAPSULE(25 MG) BY MOUTH EVERY NIGHT AS NEEDED FOR INSOMNIA OR ANXIETY    ISOSORBIDE MONONITRATE (IMDUR) 30 MG 24 HR TABLET    Take 1 tablet (30 mg total) by mouth once daily.    LEVOTHYROXINE (SYNTHROID) 50 MCG TABLET    Take 50 mcg by mouth every morning.    LEVOTHYROXINE (SYNTHROID) 75 MCG TABLET    Take 1 tablet (75 mcg total) by mouth before breakfast.    LIDOCAINE (LIDODERM) 5 %    Place 1 patch onto the skin daily as needed (back pain). Remove & Discard patch within 12 hours or as directed by MD    NITROGLYCERIN (NITROSTAT) 0.4 MG SL TABLET    Place 1 tablet (0.4 mg total) under the tongue every 5 (five) minutes as needed for Chest pain.    RANOLAZINE (RANEXA) 500 MG TB12    Take 1 tablet (500 mg total) by mouth 2 (two) times daily.    ROSUVASTATIN (CRESTOR) 40 MG TAB    Take 1 tablet (40 mg total) by mouth every evening.    SODIUM BICARBONATE 650 MG TABLET    Take 1 tablet (650 mg total) by mouth once daily. for 30 doses   Modified Medications    No medications on file   Discontinued Medications    No medications on file        Enoch Salazar MD  Cardiovascular Disease [facility]

## 2024-06-11 ENCOUNTER — OFFICE VISIT (OUTPATIENT)
Dept: FAMILY MEDICINE | Facility: CLINIC | Age: 64
End: 2024-06-11
Payer: COMMERCIAL

## 2024-06-11 VITALS
WEIGHT: 177.5 LBS | HEIGHT: 70 IN | BODY MASS INDEX: 25.41 KG/M2 | DIASTOLIC BLOOD PRESSURE: 58 MMHG | OXYGEN SATURATION: 98 % | SYSTOLIC BLOOD PRESSURE: 110 MMHG | HEART RATE: 64 BPM

## 2024-06-11 DIAGNOSIS — I48.19 PERSISTENT ATRIAL FIBRILLATION: ICD-10-CM

## 2024-06-11 DIAGNOSIS — Z99.2 ESRD (END STAGE RENAL DISEASE) ON DIALYSIS: Primary | ICD-10-CM

## 2024-06-11 DIAGNOSIS — I15.0 RENOVASCULAR HYPERTENSION: ICD-10-CM

## 2024-06-11 DIAGNOSIS — N18.6 ESRD (END STAGE RENAL DISEASE) ON DIALYSIS: Primary | ICD-10-CM

## 2024-06-11 DIAGNOSIS — J44.9 CHRONIC OBSTRUCTIVE PULMONARY DISEASE, UNSPECIFIED COPD TYPE: Chronic | ICD-10-CM

## 2024-06-11 PROBLEM — E87.1 HYPONATREMIA: Status: RESOLVED | Noted: 2024-01-31 | Resolved: 2024-06-11

## 2024-06-11 PROBLEM — N18.4 CKD (CHRONIC KIDNEY DISEASE) STAGE 4, GFR 15-29 ML/MIN: Status: RESOLVED | Noted: 2021-08-20 | Resolved: 2024-06-11

## 2024-06-11 PROBLEM — K72.00 ACUTE LIVER FAILURE WITHOUT HEPATIC COMA: Status: RESOLVED | Noted: 2024-01-27 | Resolved: 2024-06-11

## 2024-06-11 PROBLEM — D62 ABLA (ACUTE BLOOD LOSS ANEMIA): Status: RESOLVED | Noted: 2024-02-16 | Resolved: 2024-06-11

## 2024-06-11 PROBLEM — N19 UREMIA: Status: RESOLVED | Noted: 2024-02-03 | Resolved: 2024-06-11

## 2024-06-11 PROCEDURE — 3044F HG A1C LEVEL LT 7.0%: CPT | Mod: CPTII,S$GLB,, | Performed by: STUDENT IN AN ORGANIZED HEALTH CARE EDUCATION/TRAINING PROGRAM

## 2024-06-11 PROCEDURE — 3062F POS MACROALBUMINURIA REV: CPT | Mod: CPTII,S$GLB,, | Performed by: STUDENT IN AN ORGANIZED HEALTH CARE EDUCATION/TRAINING PROGRAM

## 2024-06-11 PROCEDURE — G2211 COMPLEX E/M VISIT ADD ON: HCPCS | Mod: S$GLB,,, | Performed by: STUDENT IN AN ORGANIZED HEALTH CARE EDUCATION/TRAINING PROGRAM

## 2024-06-11 PROCEDURE — 99214 OFFICE O/P EST MOD 30 MIN: CPT | Mod: S$GLB,,, | Performed by: STUDENT IN AN ORGANIZED HEALTH CARE EDUCATION/TRAINING PROGRAM

## 2024-06-11 PROCEDURE — 3074F SYST BP LT 130 MM HG: CPT | Mod: CPTII,S$GLB,, | Performed by: STUDENT IN AN ORGANIZED HEALTH CARE EDUCATION/TRAINING PROGRAM

## 2024-06-11 PROCEDURE — 3078F DIAST BP <80 MM HG: CPT | Mod: CPTII,S$GLB,, | Performed by: STUDENT IN AN ORGANIZED HEALTH CARE EDUCATION/TRAINING PROGRAM

## 2024-06-11 PROCEDURE — 1160F RVW MEDS BY RX/DR IN RCRD: CPT | Mod: CPTII,S$GLB,, | Performed by: STUDENT IN AN ORGANIZED HEALTH CARE EDUCATION/TRAINING PROGRAM

## 2024-06-11 PROCEDURE — 3066F NEPHROPATHY DOC TX: CPT | Mod: CPTII,S$GLB,, | Performed by: STUDENT IN AN ORGANIZED HEALTH CARE EDUCATION/TRAINING PROGRAM

## 2024-06-11 PROCEDURE — 1159F MED LIST DOCD IN RCRD: CPT | Mod: CPTII,S$GLB,, | Performed by: STUDENT IN AN ORGANIZED HEALTH CARE EDUCATION/TRAINING PROGRAM

## 2024-06-11 PROCEDURE — 99999 PR PBB SHADOW E&M-EST. PATIENT-LVL V: CPT | Mod: PBBFAC,,, | Performed by: STUDENT IN AN ORGANIZED HEALTH CARE EDUCATION/TRAINING PROGRAM

## 2024-06-11 PROCEDURE — 3008F BODY MASS INDEX DOCD: CPT | Mod: CPTII,S$GLB,, | Performed by: STUDENT IN AN ORGANIZED HEALTH CARE EDUCATION/TRAINING PROGRAM

## 2024-06-11 NOTE — PROGRESS NOTES
History & Physical  [facility]      SUBJECTIVE:     History of Present Illness:  Patient is a 63 y.o. male presents to clinic to establish care. PMH DDD, closed fracture of tranverse process of lumbar vertebra, nuclear sclerosis, COPD, HLD, HTN, PVD, CAD, carotid artery stenosis, PAF, s/p AV graft placement with ESRD on HD TThS, anemia due to CKD, hypothyroidism. Established with heme/onc, nephro, gen surg, transplant, cardiology, neurosurgery    ESRD on HD: Doing well. Hasn't had issues with access recently. Overall compliant not having any difficulties getting to sessions. Doing well. Seeing nephrology.     COPD: Doing well on inhalers.     HTN: Stable.     PAF: Stable, seeing cardiology      Review of patient's allergies indicates:   Allergen Reactions    Ace inhibitors Rash       Past Medical History:   Diagnosis Date    Allergy     Anemia     Anticoagulant long-term use     Arthritis     CKD (chronic kidney disease) stage 4, GFR 15-29 ml/min     COPD (chronic obstructive pulmonary disease) 08/20/2021    Coronary artery disease     Heart attack 10/04/2019    Hematuria     Hemothorax     Hyperlipidemia     Hypertension     Hyperuricemia     Hypocalcemia     Hypokalemia     Hypophosphatemia     Hypothyroidism 3/2/2023    PAD (peripheral artery disease)     Proteinuria     Vitamin D deficiency      Past Surgical History:   Procedure Laterality Date    ABDOMINAL AORTOGRAPHY N/A 5/10/2019    Procedure: AORTOGRAM-ABDOMINAL;  Surgeon: Keo Jnekins MD;  Location: [facility] CATH LAB/EP;  Service: Cardiology;  Laterality: N/A;  RSFA intervention     AORTOGRAPHY WITH SERIALOGRAPHY N/A 12/3/2021    Procedure: AORTOGRAM, WITH SERIALOGRAPHY;  Surgeon: Keo Jenkins MD;  Location: [facility] CATH LAB/EP;  Service: Cardiology;  Laterality: N/A;    AORTOGRAPHY WITH SERIALOGRAPHY N/A 4/1/2022    Procedure: AORTOGRAM, WITH SERIALOGRAPHY;  Surgeon: Keo Jenkins MD;  Location: [facility] CATH LAB/EP;   Service: Cardiology;  Laterality: N/A;    ATHERECTOMY, CORONARY N/A 4/25/2023    Procedure: Atherectomy-coronary;  Surgeon: Keo Jenkins MD;  Location: [facility] CATH LAB/EP;  Service: Cardiology;  Laterality: N/A;    BONE MARROW BIOPSY Right 10/12/2021    Procedure: BIOPSY-BONE MARROW;  Surgeon: Naseem Woods MD;  Location: [facility] OR;  Service: Oncology;  Laterality: Right;    CARDIAC CATHETERIZATION      CATARACT EXTRACTION      CATARACT EXTRACTION W/  INTRAOCULAR LENS IMPLANT Right 6/8/2020    Procedure: EXTRACTION, CATARACT, WITH IOL INSERTION;  Surgeon: Tin Light MD;  Location: [facility] OR;  Service: Ophthalmology;  Laterality: Right;    CATARACT EXTRACTION W/  INTRAOCULAR LENS IMPLANT Left 7/2/2020    Procedure: EXTRACTION, CATARACT, WITH IOL INSERTION;  Surgeon: Tin Light MD;  [facility];  Service: Ophthalmology;  Laterality: Left;    COLONOSCOPY N/A 1/6/2022    Procedure: COLONOSCOPY;  Surgeon: Kathe Penaloza MD;  Location: [address]);  Service: Endoscopy;  Laterality: N/A;  COVID test at [facility] on 1/3-GT  okay to hold Plavix for 5 days and aspirin per Dr. Becerra  2nd floor due toextensive cardiac history   instructions mailed and my [facility] portal -     CORONARY ANGIOGRAPHY N/A 3/29/2019    Procedure: ANGIOGRAM, CORONARY ARTERY;  Surgeon: Keo Jenkins MD;  Location: [facility] CATH LAB/EP;  Service: Cardiology;  Laterality: N/A;    CORONARY ANGIOGRAPHY Left 10/11/2019    Procedure: ANGIOGRAM, CORONARY ARTERY;  Surgeon: Keo Jenkins MD;  Location: [facility] CATH LAB/EP;  Service: Cardiology;  Laterality: Left;    CORONARY ANGIOGRAPHY N/A 4/10/2023    Procedure: ANGIOGRAM, CORONARY ARTERY;  Surgeon: Keo Jenkins MD;  Location: [facility] CATH LAB/EP;  Service: Cardiology;  Laterality: N/A;    CORONARY ANGIOPLASTY WITH STENT PLACEMENT  03/29/2019    mid and distal RCA    ESOPHAGOGASTRODUODENOSCOPY N/A 1/6/2022    Procedure: EGD (ESOPHAGOGASTRODUODENOSCOPY);  Surgeon: Kathe Penaloza MD;   Location: [facility] ENDO ([facility] FLR);  Service: Endoscopy;  Laterality: N/A;    EYE SURGERY      INSERTION OF TUNNELED CENTRAL VENOUS HEMODIALYSIS CATHETER N/A 2/9/2024    Procedure: INSERTION, CATHETER, HEMODIALYSIS, DUAL LUMEN;  Surgeon: Wang Mendieta MD;  Location: [facility] OR;  Service: General;  Laterality: N/A;    INSTANTANEOUS WAVE-FREE RATIO (IFR) N/A 4/25/2023    Procedure: Instantaneous Wave-Free Ratio (IFR);  Surgeon: Keo Jenkins MD;  Location: [facility] CATH LAB/EP;  Service: Cardiology;  Laterality: N/A;    INTRAVASCULAR ULTRASOUND, NON-CORONARY  8/12/2022    Procedure: Intravascular Ultrasound, Non-Coronary;  Surgeon: Keo Jenkins MD;  Location: [facility] CATH LAB/EP;  Service: Cardiology;;    IVUS, CORONARY  4/25/2023    Procedure: IVUS, Coronary;  Surgeon: Keo Jenkins MD;  Location: [facility] CATH LAB/EP;  Service: Cardiology;;    IVUS, CORONARY  5/16/2023    Procedure: IVUS, Coronary;  Surgeon: Keo Jenkins MD;  Location: [facility] CATH LAB/EP;  Service: Cardiology;;  CX    LEFT HEART CATHETERIZATION N/A 3/29/2019    Procedure: Left heart cath;  Surgeon: Keo Jenkins MD;  Location: [facility] CATH LAB/EP;  Service: Cardiology;  Laterality: N/A;    LEFT HEART CATHETERIZATION Left 10/8/2019    Procedure: Left heart cath;  Surgeon: Keo Jenkins MD;  Location: [facility] CATH LAB/EP;  Service: Cardiology;  Laterality: Left;    LEFT HEART CATHETERIZATION Left 4/10/2023    Procedure: Left heart cath;  Surgeon: Keo Jenkins MD;  Location: [facility] CATH LAB/EP;  Service: Cardiology;  Laterality: Left;    LEFT HEART CATHETERIZATION Left 4/25/2023    Procedure: Left heart cath;  Surgeon: Keo Jenkins MD;  Location: [facility] CATH LAB/EP;  Service: Cardiology;  Laterality: Left;    LEFT HEART CATHETERIZATION Left 5/16/2023    Procedure: Left heart cath;  Surgeon: Keo Jenkins MD;  Location: [facility] CATH LAB/EP;  Service: Cardiology;  Laterality: Left;    PERCUTANEOUS TRANSLUMINAL ANGIOPLASTY (PTA) OF PERIPHERAL VESSEL N/A 7/12/2019     Procedure: PTA, PERIPHERAL VESSEL;  Surgeon: Keo Jenkins MD;  Location: [facility] CATH LAB/EP;  Service: Cardiology;  Laterality: N/A;    PERCUTANEOUS TRANSLUMINAL ANGIOPLASTY (PTA) OF PERIPHERAL VESSEL Left 5/20/2022    Procedure: PTA, PERIPHERAL VESSEL;  Surgeon: Keo Jenkins MD;  Location: [facility] CATH LAB/EP;  Service: Cardiology;  Laterality: Left;    PERCUTANEOUS TRANSLUMINAL ANGIOPLASTY (PTA) OF PERIPHERAL VESSEL Right 8/12/2022    Procedure: PTA, PERIPHERAL VESSEL;  Surgeon: Keo Jenkins MD;  Location: [facility] CATH LAB/EP;  Service: Cardiology;  Laterality: Right;    PERCUTANEOUS TRANSLUMINAL BALLOON ANGIOPLASTY OF CORONARY ARTERY  4/25/2023    Procedure: Angioplasty-coronary;  Surgeon: Keo Jenkins MD;  Location: [facility] CATH LAB/EP;  Service: Cardiology;;    PLACEMENT OF ARTERIOVENOUS GRAFT Left 4/15/2024    Procedure: INSERTION, GRAFT, ARTERIOVENOUS;  Surgeon: Scott Pascual MD;  Location: [facility] OR;  Service: General;  Laterality: Left;    PTCA, SINGLE VESSEL  5/16/2023    Procedure: PTCA, Single Vessel;  Surgeon: Keo Jenkins MD;  Location: [facility] CATH LAB/EP;  Service: Cardiology;;  CX    REMOVAL OF HEMODIALYSIS CATHETER Right 2/9/2024    Procedure: REMOVAL, CATHETER, HEMODIALYSIS;  Surgeon: Wang Mendieta MD;  Location: [facility] OR;  Service: General;  Laterality: Right;    REMOVAL OF TUNNELED CENTRAL VENOUS CATHETER (CVC) Right 5/20/2024    Procedure: REMOVAL, CATHETER, CENTRAL VENOUS, TUNNELED;  Surgeon: Scott Pascual MD;  Location: [facility] OR;  Service: General;  Laterality: Right;    STENT, DRUG ELUTING, SINGLE VESSEL, CORONARY  4/25/2023    Procedure: Stent, Drug Eluting, Single Vessel, Coronary;  Surgeon: Keo Jenkins MD;  Location: [facility] CATH LAB/EP;  Service: Cardiology;;    STENT, DRUG ELUTING, SINGLE VESSEL, CORONARY  5/16/2023    Procedure: Stent, Drug Eluting, Single Vessel, Coronary;  Surgeon: Keo Jenkins MD;  Location: [facility] CATH LAB/EP;  Service: Cardiology;;  CX     "TRANSESOPHAGEAL ECHOCARDIOGRAM WITH POSSIBLE CARDIOVERSION (JOSE W/ POSS CARDIOVERSION) N/A 2023    Procedure: Transesophageal echo (JOSE) intra-procedure log documentation;  Surgeon: Keo Jenkins MD;  Location: [facility] CATH LAB/EP;  Service: Cardiology;  Laterality: N/A;     Family History   Problem Relation Name Age of Onset    Aneurysm Mother      Hypertension Mother      Heart disease Mother      Cancer Father      Diabetes Sister 1     Hypertension Sister 1     No Known Problems Brother 1     No Known Problems Son 1      Social History     Tobacco Use    Smoking status: Former     Current packs/day: 0.00     Types: Cigarettes     Quit date: 1999     Years since quittin.1    Smokeless tobacco: Never   Substance Use Topics    Alcohol use: No     Alcohol/week: 0.0 standard drinks of alcohol    Drug use: No        OBJECTIVE:     Vital Signs (Most Recent)  Vitals:    24 0858   BP: (!) 110/58   BP Location: Left arm   Patient Position: Sitting   BP Method: Large (Manual)   Pulse: 64   SpO2: 98%   Weight: 80.5 kg (177 lb 7.5 oz)   Height: 5' 10" (1.778 m)     BMI: 25.46    Physical Exam:  Gen: No apparent distress, well nourished and developed, appears stated age  CV: RRR, S1 and S2 present, no LE edema, HD access intact in LUE  Resp: CTAB, normal respiratory effort      ASSESSMENT/PLAN:   63 y.o.male presents to clinic to establish care.     1. ESRD (end stage renal disease) on dialysis  Doing well, seeing nephrology.     2. Persistent atrial fibrillation  Stable. In sinus rhythm today. Seeing cardiology.     3. Renovascular hypertension  Stable. Seeing cardiology and nephrology.     4. Chronic obstructive pulmonary disease, unspecified COPD type  Stable. Doing well on inhalers     Follow-up: 6 months for routine healthcare    I spent a total of 30 minutes on the day of the visit.This includes face to face time and non-face to face time preparing to see the patient (eg, review of tests), obtaining " and/or reviewing separately obtained history, documenting clinical information in the electronic or other health record, independently interpreting results and communicating results to the patient/family/caregiver, or care coordinator.      Perez Bell, DO  Family Medicine

## 2024-06-25 ENCOUNTER — HOSPITAL ENCOUNTER (INPATIENT)
Facility: HOSPITAL | Age: 64
LOS: 2 days | Discharge: HOME | DRG: 250 | End: 2024-06-27
Attending: STUDENT IN AN ORGANIZED HEALTH CARE EDUCATION/TRAINING PROGRAM | Admitting: INTERNAL MEDICINE
Payer: COMMERCIAL

## 2024-06-25 DIAGNOSIS — I21.4 NSTEMI (NON-ST ELEVATION MYOCARDIAL INFARCTION): ICD-10-CM

## 2024-06-25 DIAGNOSIS — Z98.890 STATUS POST CARDIAC CATHETERIZATION: ICD-10-CM

## 2024-06-25 DIAGNOSIS — I21.4 NSTEMI (NON-ST ELEVATED MYOCARDIAL INFARCTION): Primary | ICD-10-CM

## 2024-06-25 DIAGNOSIS — R07.9 CHEST PAIN: ICD-10-CM

## 2024-06-25 DIAGNOSIS — I21.4 NON-ST ELEVATION MYOCARDIAL INFARCTION (NSTEMI): ICD-10-CM

## 2024-06-25 PROBLEM — J44.9 COPD (CHRONIC OBSTRUCTIVE PULMONARY DISEASE): Status: ACTIVE | Noted: 2021-08-20

## 2024-06-25 LAB
ABO + RH BLD: NORMAL
ALBUMIN SERPL BCP-MCNC: 3.1 G/DL (ref 3.5–5.2)
ALP SERPL-CCNC: 64 U/L (ref 55–135)
ALT SERPL W/O P-5'-P-CCNC: 8 U/L (ref 10–44)
ANION GAP SERPL CALC-SCNC: 14 MMOL/L (ref 8–16)
APICAL FOUR CHAMBER EJECTION FRACTION: 43 %
APICAL TWO CHAMBER EJECTION FRACTION: 42 %
APTT PPP: 37.3 SEC (ref 21–32)
APTT PPP: 37.4 SEC (ref 21–32)
APTT PPP: 57.5 SEC (ref 21–32)
APTT PPP: 64.1 SEC (ref 21–32)
ASCENDING AORTA: 3.44 CM
AST SERPL-CCNC: 17 U/L (ref 10–40)
AV INDEX (PROSTH): 0.82
AV MEAN GRADIENT: 5 MMHG
AV PEAK GRADIENT: 9 MMHG
AV VALVE AREA BY VELOCITY RATIO: 3.15 CM²
AV VALVE AREA: 2.91 CM²
AV VELOCITY RATIO: 0.88
BASOPHILS # BLD AUTO: 0.03 K/UL (ref 0–0.2)
BASOPHILS # BLD AUTO: 0.05 K/UL (ref 0–0.2)
BASOPHILS NFR BLD: 0.5 % (ref 0–1.9)
BASOPHILS NFR BLD: 0.8 % (ref 0–1.9)
BILIRUB SERPL-MCNC: 0.5 MG/DL (ref 0.1–1)
BLD GP AB SCN CELLS X3 SERPL QL: NORMAL
BNP SERPL-MCNC: 868 PG/ML (ref 0–99)
BSA FOR ECHO PROCEDURE: 2.01 M2
BUN SERPL-MCNC: 35 MG/DL (ref 8–23)
CALCIUM SERPL-MCNC: 8.8 MG/DL (ref 8.7–10.5)
CHLORIDE SERPL-SCNC: 100 MMOL/L (ref 95–110)
CHOLEST SERPL-MCNC: 129 MG/DL (ref 120–199)
CHOLEST/HDLC SERPL: 2 {RATIO} (ref 2–5)
CO2 SERPL-SCNC: 22 MMOL/L (ref 23–29)
CREAT SERPL-MCNC: 4.6 MG/DL (ref 0.5–1.4)
CV ECHO LV RWT: 0.37 CM
DIFFERENTIAL METHOD BLD: ABNORMAL
DIFFERENTIAL METHOD BLD: ABNORMAL
DOP CALC AO PEAK VEL: 1.47 M/S
DOP CALC AO VTI: 33.2 CM
DOP CALC LVOT AREA: 3.6 CM2
DOP CALC LVOT DIAMETER: 2.13 CM
DOP CALC LVOT PEAK VEL: 1.3 M/S
DOP CALC LVOT STROKE VOLUME: 96.52 CM3
DOP CALC MV VTI: 31.7 CM
DOP CALCLVOT PEAK VEL VTI: 27.1 CM
E WAVE DECELERATION TIME: 175.32 MSEC
E/A RATIO: 3.81
E/E' RATIO: 21.69 M/S
ECHO LV POSTERIOR WALL: 1.13 CM (ref 0.6–1.1)
EOSINOPHIL # BLD AUTO: 0.2 K/UL (ref 0–0.5)
EOSINOPHIL # BLD AUTO: 0.2 K/UL (ref 0–0.5)
EOSINOPHIL NFR BLD: 3.2 % (ref 0–8)
EOSINOPHIL NFR BLD: 3.3 % (ref 0–8)
ERYTHROCYTE [DISTWIDTH] IN BLOOD BY AUTOMATED COUNT: 14.9 % (ref 11.5–14.5)
ERYTHROCYTE [DISTWIDTH] IN BLOOD BY AUTOMATED COUNT: 15 % (ref 11.5–14.5)
EST. GFR  (NO RACE VARIABLE): 14 ML/MIN/1.73 M^2
ESTIMATED AVG GLUCOSE: 85 MG/DL (ref 68–131)
FRACTIONAL SHORTENING: 20 % (ref 28–44)
GLUCOSE SERPL-MCNC: 96 MG/DL (ref 70–110)
HBA1C MFR BLD: 4.6 % (ref 4–5.6)
HCT VFR BLD AUTO: 24.9 % (ref 40–54)
HCT VFR BLD AUTO: 25.7 % (ref 40–54)
HDLC SERPL-MCNC: 63 MG/DL (ref 40–75)
HDLC SERPL: 48.8 % (ref 20–50)
HGB BLD-MCNC: 8.1 G/DL (ref 14–18)
HGB BLD-MCNC: 8.5 G/DL (ref 14–18)
IMM GRANULOCYTES # BLD AUTO: 0.01 K/UL (ref 0–0.04)
IMM GRANULOCYTES # BLD AUTO: 0.02 K/UL (ref 0–0.04)
IMM GRANULOCYTES NFR BLD AUTO: 0.2 % (ref 0–0.5)
IMM GRANULOCYTES NFR BLD AUTO: 0.3 % (ref 0–0.5)
INR PPP: 1.3 (ref 0.8–1.2)
INR PPP: 1.3 (ref 0.8–1.2)
INTERVENTRICULAR SEPTUM: 1.22 CM (ref 0.6–1.1)
IVC DIAMETER: 2.05 CM
LA MAJOR: 5.26 CM
LA MINOR: 5.93 CM
LA WIDTH: 3.4 CM
LDLC SERPL CALC-MCNC: 55.2 MG/DL (ref 63–159)
LEFT ATRIUM AREA SYSTOLIC (APICAL 2 CHAMBER): 25.69 CM2
LEFT ATRIUM AREA SYSTOLIC (APICAL 4 CHAMBER): 15.13 CM2
LEFT ATRIUM SIZE: 3.86 CM
LEFT ATRIUM VOLUME INDEX MOD: 29.1 ML/M2
LEFT ATRIUM VOLUME INDEX: 31.1 ML/M2
LEFT ATRIUM VOLUME MOD: 58.18 CM3
LEFT ATRIUM VOLUME: 62.19 CM3
LEFT INTERNAL DIMENSION IN SYSTOLE: 4.87 CM (ref 2.1–4)
LEFT VENTRICLE DIASTOLIC VOLUME INDEX: 92.24 ML/M2
LEFT VENTRICLE DIASTOLIC VOLUME: 184.48 ML
LEFT VENTRICLE END DIASTOLIC VOLUME APICAL 2 CHAMBER: 139.02 ML
LEFT VENTRICLE END DIASTOLIC VOLUME APICAL 4 CHAMBER: 132.34 ML
LEFT VENTRICLE END SYSTOLIC VOLUME APICAL 2 CHAMBER: 84.19 ML
LEFT VENTRICLE END SYSTOLIC VOLUME APICAL 4 CHAMBER: 34.93 ML
LEFT VENTRICLE MASS INDEX: 155 G/M2
LEFT VENTRICLE SYSTOLIC VOLUME INDEX: 55.7 ML/M2
LEFT VENTRICLE SYSTOLIC VOLUME: 111.46 ML
LEFT VENTRICULAR INTERNAL DIMENSION IN DIASTOLE: 6.06 CM (ref 3.5–6)
LEFT VENTRICULAR MASS: 310.34 G
LV LATERAL E/E' RATIO: 17.63 M/S
LV SEPTAL E/E' RATIO: 28.2 M/S
LVED V (TEICH): 184.48 ML
LVES V (TEICH): 111.46 ML
LVOT MG: 2.93 MMHG
LVOT MV: 0.78 CM/S
LYMPHOCYTES # BLD AUTO: 1.1 K/UL (ref 1–4.8)
LYMPHOCYTES # BLD AUTO: 1.2 K/UL (ref 1–4.8)
LYMPHOCYTES NFR BLD: 18.4 % (ref 18–48)
LYMPHOCYTES NFR BLD: 19.1 % (ref 18–48)
MCH RBC QN AUTO: 27.8 PG (ref 27–31)
MCH RBC QN AUTO: 28.2 PG (ref 27–31)
MCHC RBC AUTO-ENTMCNC: 32.5 G/DL (ref 32–36)
MCHC RBC AUTO-ENTMCNC: 33.1 G/DL (ref 32–36)
MCV RBC AUTO: 85 FL (ref 82–98)
MCV RBC AUTO: 86 FL (ref 82–98)
MONOCYTES # BLD AUTO: 0.6 K/UL (ref 0.3–1)
MONOCYTES # BLD AUTO: 0.6 K/UL (ref 0.3–1)
MONOCYTES NFR BLD: 10.5 % (ref 4–15)
MONOCYTES NFR BLD: 8.5 % (ref 4–15)
MV A" WAVE DURATION": 82.78 MSEC
MV MEAN GRADIENT: 2 MMHG
MV PEAK A VEL: 0.37 M/S
MV PEAK E VEL: 1.41 M/S
MV PEAK GRADIENT: 8 MMHG
MV STENOSIS PRESSURE HALF TIME: 50.84 MS
MV VALVE AREA BY CONTINUITY EQUATION: 3.04 CM2
MV VALVE AREA P 1/2 METHOD: 4.33 CM2
NEUTROPHILS # BLD AUTO: 3.8 K/UL (ref 1.8–7.7)
NEUTROPHILS # BLD AUTO: 4.5 K/UL (ref 1.8–7.7)
NEUTROPHILS NFR BLD: 66.5 % (ref 38–73)
NEUTROPHILS NFR BLD: 68.7 % (ref 38–73)
NONHDLC SERPL-MCNC: 66 MG/DL
NRBC BLD-RTO: 0 /100 WBC
NRBC BLD-RTO: 0 /100 WBC
PHOSPHATE SERPL-MCNC: 4.6 MG/DL (ref 2.7–4.5)
PISA RADIUS: 0.94 CM
PISA TR MAX VEL: 3.11 M/S
PISA VN NYQUIST MS: 0.37 M/S
PISA VN NYQUIST: 0.37 M/S
PLATELET # BLD AUTO: 157 K/UL (ref 150–450)
PLATELET # BLD AUTO: 179 K/UL (ref 150–450)
PMV BLD AUTO: 10.2 FL (ref 9.2–12.9)
PMV BLD AUTO: 10.3 FL (ref 9.2–12.9)
POTASSIUM SERPL-SCNC: 3 MMOL/L (ref 3.5–5.1)
PROT SERPL-MCNC: 6.9 G/DL (ref 6–8.4)
PROTHROMBIN TIME: 13.5 SEC (ref 9–12.5)
PROTHROMBIN TIME: 14.1 SEC (ref 9–12.5)
PULM VEIN S/D RATIO: 0.59
PV MV: 0.8 M/S
PV PEAK D VEL: 0.91 M/S
PV PEAK GRADIENT: 6 MMHG
PV PEAK S VEL: 0.54 M/S
PV PEAK VELOCITY: 1.18 M/S
RA MAJOR: 4.8 CM
RA PRESSURE ESTIMATED: 8 MMHG
RA WIDTH: 4.37 CM
RBC # BLD AUTO: 2.91 M/UL (ref 4.6–6.2)
RBC # BLD AUTO: 3.01 M/UL (ref 4.6–6.2)
RV TB RVSP: 11 MMHG
RV TISSUE DOPPLER FREE WALL SYSTOLIC VELOCITY 1 (APICAL 4 CHAMBER VIEW): 12.41 CM/S
SINUS: 3.89 CM
SODIUM SERPL-SCNC: 136 MMOL/L (ref 136–145)
SPECIMEN OUTDATE: NORMAL
STJ: 3.4 CM
TDI LATERAL: 0.08 M/S
TDI SEPTAL: 0.05 M/S
TDI: 0.07 M/S
TR MAX PG: 39 MMHG
TRICUSPID ANNULAR PLANE SYSTOLIC EXCURSION: 2.45 CM
TRIGL SERPL-MCNC: 54 MG/DL (ref 30–150)
TROPONIN I SERPL DL<=0.01 NG/ML-MCNC: 0.68 NG/ML (ref 0–0.03)
TROPONIN I SERPL DL<=0.01 NG/ML-MCNC: 0.71 NG/ML (ref 0–0.03)
TROPONIN I SERPL DL<=0.01 NG/ML-MCNC: 0.71 NG/ML (ref 0–0.03)
TROPONIN I SERPL DL<=0.01 NG/ML-MCNC: 0.76 NG/ML (ref 0–0.03)
TV REST PULMONARY ARTERY PRESSURE: 47 MMHG
WBC # BLD AUTO: 5.71 K/UL (ref 3.9–12.7)
WBC # BLD AUTO: 6.58 K/UL (ref 3.9–12.7)
Z-SCORE OF LEFT VENTRICULAR DIMENSION IN END DIASTOLE: 0.39
Z-SCORE OF LEFT VENTRICULAR DIMENSION IN END SYSTOLE: 2.44

## 2024-06-25 PROCEDURE — 80053 COMPREHEN METABOLIC PANEL: CPT | Performed by: STUDENT IN AN ORGANIZED HEALTH CARE EDUCATION/TRAINING PROGRAM

## 2024-06-25 PROCEDURE — 85025 COMPLETE CBC W/AUTO DIFF WBC: CPT | Performed by: STUDENT IN AN ORGANIZED HEALTH CARE EDUCATION/TRAINING PROGRAM

## 2024-06-25 PROCEDURE — 36415 COLL VENOUS BLD VENIPUNCTURE: CPT | Performed by: INTERNAL MEDICINE

## 2024-06-25 PROCEDURE — 84484 ASSAY OF TROPONIN QUANT: CPT | Mod: 91 | Performed by: INTERNAL MEDICINE

## 2024-06-25 PROCEDURE — 83036 HEMOGLOBIN GLYCOSYLATED A1C: CPT | Performed by: FAMILY MEDICINE

## 2024-06-25 PROCEDURE — 63600175 PHARM REV CODE 636 W HCPCS: Performed by: STUDENT IN AN ORGANIZED HEALTH CARE EDUCATION/TRAINING PROGRAM

## 2024-06-25 PROCEDURE — 96365 THER/PROPH/DIAG IV INF INIT: CPT

## 2024-06-25 PROCEDURE — 85730 THROMBOPLASTIN TIME PARTIAL: CPT | Mod: 91 | Performed by: STUDENT IN AN ORGANIZED HEALTH CARE EDUCATION/TRAINING PROGRAM

## 2024-06-25 PROCEDURE — 25000003 PHARM REV CODE 250: Performed by: STUDENT IN AN ORGANIZED HEALTH CARE EDUCATION/TRAINING PROGRAM

## 2024-06-25 PROCEDURE — 99900035 HC TECH TIME PER 15 MIN (STAT)

## 2024-06-25 PROCEDURE — 86850 RBC ANTIBODY SCREEN: CPT | Performed by: STUDENT IN AN ORGANIZED HEALTH CARE EDUCATION/TRAINING PROGRAM

## 2024-06-25 PROCEDURE — 85610 PROTHROMBIN TIME: CPT | Mod: 91 | Performed by: STUDENT IN AN ORGANIZED HEALTH CARE EDUCATION/TRAINING PROGRAM

## 2024-06-25 PROCEDURE — 80061 LIPID PANEL: CPT | Performed by: STUDENT IN AN ORGANIZED HEALTH CARE EDUCATION/TRAINING PROGRAM

## 2024-06-25 PROCEDURE — 96366 THER/PROPH/DIAG IV INF ADDON: CPT

## 2024-06-25 PROCEDURE — 11000001 HC ACUTE MED/SURG PRIVATE ROOM

## 2024-06-25 PROCEDURE — 84100 ASSAY OF PHOSPHORUS: CPT | Performed by: STUDENT IN AN ORGANIZED HEALTH CARE EDUCATION/TRAINING PROGRAM

## 2024-06-25 PROCEDURE — 85730 THROMBOPLASTIN TIME PARTIAL: CPT | Mod: 91 | Performed by: INTERNAL MEDICINE

## 2024-06-25 PROCEDURE — 99291 CRITICAL CARE FIRST HOUR: CPT

## 2024-06-25 PROCEDURE — 25000242 PHARM REV CODE 250 ALT 637 W/ HCPCS: Performed by: STUDENT IN AN ORGANIZED HEALTH CARE EDUCATION/TRAINING PROGRAM

## 2024-06-25 PROCEDURE — 25000003 PHARM REV CODE 250: Performed by: NURSE PRACTITIONER

## 2024-06-25 PROCEDURE — 93010 ELECTROCARDIOGRAM REPORT: CPT | Mod: ,,, | Performed by: INTERNAL MEDICINE

## 2024-06-25 PROCEDURE — 84484 ASSAY OF TROPONIN QUANT: CPT | Performed by: STUDENT IN AN ORGANIZED HEALTH CARE EDUCATION/TRAINING PROGRAM

## 2024-06-25 PROCEDURE — 84484 ASSAY OF TROPONIN QUANT: CPT | Mod: 91 | Performed by: STUDENT IN AN ORGANIZED HEALTH CARE EDUCATION/TRAINING PROGRAM

## 2024-06-25 PROCEDURE — 83880 ASSAY OF NATRIURETIC PEPTIDE: CPT | Performed by: STUDENT IN AN ORGANIZED HEALTH CARE EDUCATION/TRAINING PROGRAM

## 2024-06-25 PROCEDURE — 63600175 PHARM REV CODE 636 W HCPCS: Performed by: INTERNAL MEDICINE

## 2024-06-25 PROCEDURE — 99223 1ST HOSP IP/OBS HIGH 75: CPT | Mod: 25,,, | Performed by: NURSE PRACTITIONER

## 2024-06-25 PROCEDURE — 85730 THROMBOPLASTIN TIME PARTIAL: CPT | Performed by: STUDENT IN AN ORGANIZED HEALTH CARE EDUCATION/TRAINING PROGRAM

## 2024-06-25 PROCEDURE — 94761 N-INVAS EAR/PLS OXIMETRY MLT: CPT

## 2024-06-25 PROCEDURE — 93005 ELECTROCARDIOGRAM TRACING: CPT

## 2024-06-25 PROCEDURE — 85025 COMPLETE CBC W/AUTO DIFF WBC: CPT | Mod: 91 | Performed by: STUDENT IN AN ORGANIZED HEALTH CARE EDUCATION/TRAINING PROGRAM

## 2024-06-25 PROCEDURE — 85610 PROTHROMBIN TIME: CPT | Performed by: STUDENT IN AN ORGANIZED HEALTH CARE EDUCATION/TRAINING PROGRAM

## 2024-06-25 RX ORDER — NAPROXEN SODIUM 220 MG/1
81 TABLET, FILM COATED ORAL DAILY
Status: DISCONTINUED | OUTPATIENT
Start: 2024-06-25 | End: 2024-06-27 | Stop reason: HOSPADM

## 2024-06-25 RX ORDER — CLOPIDOGREL BISULFATE 75 MG/1
75 TABLET ORAL DAILY
Status: DISCONTINUED | OUTPATIENT
Start: 2024-06-25 | End: 2024-06-27 | Stop reason: HOSPADM

## 2024-06-25 RX ORDER — ATORVASTATIN CALCIUM 40 MG/1
80 TABLET, FILM COATED ORAL DAILY
Status: DISCONTINUED | OUTPATIENT
Start: 2024-06-25 | End: 2024-06-27 | Stop reason: HOSPADM

## 2024-06-25 RX ORDER — HYDROXYZINE PAMOATE 25 MG/1
25 CAPSULE ORAL NIGHTLY PRN
Status: DISCONTINUED | OUTPATIENT
Start: 2024-06-25 | End: 2024-06-27 | Stop reason: HOSPADM

## 2024-06-25 RX ORDER — POTASSIUM CHLORIDE 20 MEQ/1
40 TABLET, EXTENDED RELEASE ORAL ONCE
Status: COMPLETED | OUTPATIENT
Start: 2024-06-25 | End: 2024-06-25

## 2024-06-25 RX ORDER — SPIRONOLACTONE 25 MG/1
25 TABLET ORAL DAILY
Status: DISCONTINUED | OUTPATIENT
Start: 2024-06-25 | End: 2024-06-27 | Stop reason: HOSPADM

## 2024-06-25 RX ORDER — AMLODIPINE BESYLATE 5 MG/1
5 TABLET ORAL DAILY
Status: DISCONTINUED | OUTPATIENT
Start: 2024-06-25 | End: 2024-06-27 | Stop reason: HOSPADM

## 2024-06-25 RX ORDER — DIPHENHYDRAMINE HCL 50 MG
50 CAPSULE ORAL ONCE
Status: COMPLETED | OUTPATIENT
Start: 2024-06-25 | End: 2024-06-25

## 2024-06-25 RX ORDER — RANOLAZINE 500 MG/1
500 TABLET, EXTENDED RELEASE ORAL 2 TIMES DAILY
Status: DISCONTINUED | OUTPATIENT
Start: 2024-06-25 | End: 2024-06-27 | Stop reason: HOSPADM

## 2024-06-25 RX ORDER — LIDOCAINE 50 MG/G
OINTMENT TOPICAL
COMMUNITY
Start: 2024-05-28

## 2024-06-25 RX ORDER — NITROGLYCERIN 0.4 MG/1
0.4 TABLET SUBLINGUAL EVERY 5 MIN PRN
Status: DISCONTINUED | OUTPATIENT
Start: 2024-06-25 | End: 2024-06-27 | Stop reason: HOSPADM

## 2024-06-25 RX ORDER — FUROSEMIDE 10 MG/ML
40 INJECTION INTRAMUSCULAR; INTRAVENOUS ONCE
Status: COMPLETED | OUTPATIENT
Start: 2024-06-25 | End: 2024-06-25

## 2024-06-25 RX ORDER — HYDRALAZINE HYDROCHLORIDE 25 MG/1
50 TABLET, FILM COATED ORAL EVERY 8 HOURS
Status: DISCONTINUED | OUTPATIENT
Start: 2024-06-25 | End: 2024-06-27 | Stop reason: HOSPADM

## 2024-06-25 RX ORDER — IPRATROPIUM BROMIDE AND ALBUTEROL SULFATE 2.5; .5 MG/3ML; MG/3ML
3 SOLUTION RESPIRATORY (INHALATION) EVERY 6 HOURS PRN
Status: DISCONTINUED | OUTPATIENT
Start: 2024-06-25 | End: 2024-06-27 | Stop reason: HOSPADM

## 2024-06-25 RX ORDER — CARVEDILOL 25 MG/1
25 TABLET ORAL 2 TIMES DAILY
Status: DISCONTINUED | OUTPATIENT
Start: 2024-06-25 | End: 2024-06-27 | Stop reason: HOSPADM

## 2024-06-25 RX ORDER — ISOSORBIDE MONONITRATE 30 MG/1
30 TABLET, EXTENDED RELEASE ORAL DAILY
Status: DISCONTINUED | OUTPATIENT
Start: 2024-06-25 | End: 2024-06-27 | Stop reason: HOSPADM

## 2024-06-25 RX ORDER — LEVOTHYROXINE SODIUM 75 UG/1
75 TABLET ORAL
Status: DISCONTINUED | OUTPATIENT
Start: 2024-06-25 | End: 2024-06-27 | Stop reason: HOSPADM

## 2024-06-25 RX ORDER — HEPARIN SODIUM,PORCINE/D5W 25000/250
0-40 INTRAVENOUS SOLUTION INTRAVENOUS CONTINUOUS
Status: DISCONTINUED | OUTPATIENT
Start: 2024-06-25 | End: 2024-06-27 | Stop reason: HOSPADM

## 2024-06-25 RX ORDER — LIDOCAINE 50 MG/G
1 PATCH TOPICAL DAILY PRN
Status: DISCONTINUED | OUTPATIENT
Start: 2024-06-25 | End: 2024-06-27 | Stop reason: HOSPADM

## 2024-06-25 RX ORDER — SODIUM BICARBONATE 650 MG/1
650 TABLET ORAL DAILY
Status: DISCONTINUED | OUTPATIENT
Start: 2024-06-25 | End: 2024-06-25

## 2024-06-25 RX ADMIN — DIPHENHYDRAMINE HYDROCHLORIDE 50 MG: 50 CAPSULE ORAL at 06:06

## 2024-06-25 RX ADMIN — RANOLAZINE 500 MG: 500 TABLET, FILM COATED, EXTENDED RELEASE ORAL at 08:06

## 2024-06-25 RX ADMIN — HYDRALAZINE HYDROCHLORIDE 50 MG: 25 TABLET, FILM COATED ORAL at 03:06

## 2024-06-25 RX ADMIN — AMLODIPINE BESYLATE 5 MG: 5 TABLET ORAL at 09:06

## 2024-06-25 RX ADMIN — ASPIRIN 81 MG CHEWABLE TABLET 81 MG: 81 TABLET CHEWABLE at 09:06

## 2024-06-25 RX ADMIN — HYDRALAZINE HYDROCHLORIDE 50 MG: 25 TABLET, FILM COATED ORAL at 10:06

## 2024-06-25 RX ADMIN — HYDROXYZINE PAMOATE 25 MG: 25 CAPSULE ORAL at 11:06

## 2024-06-25 RX ADMIN — HEPARIN SODIUM 12 UNITS/KG/HR: 10000 INJECTION, SOLUTION INTRAVENOUS at 09:06

## 2024-06-25 RX ADMIN — SPIRONOLACTONE 25 MG: 25 TABLET ORAL at 09:06

## 2024-06-25 RX ADMIN — LEVOTHYROXINE SODIUM 75 MCG: 25 TABLET ORAL at 06:06

## 2024-06-25 RX ADMIN — ATORVASTATIN CALCIUM 80 MG: 40 TABLET, FILM COATED ORAL at 09:06

## 2024-06-25 RX ADMIN — SODIUM BICARBONATE 650 MG TABLET 650 MG: at 09:06

## 2024-06-25 RX ADMIN — HYDRALAZINE HYDROCHLORIDE 50 MG: 25 TABLET, FILM COATED ORAL at 06:06

## 2024-06-25 RX ADMIN — ISOSORBIDE MONONITRATE 30 MG: 30 TABLET, EXTENDED RELEASE ORAL at 09:06

## 2024-06-25 RX ADMIN — FUROSEMIDE 40 MG: 10 INJECTION, SOLUTION INTRAVENOUS at 01:06

## 2024-06-25 RX ADMIN — CLOPIDOGREL BISULFATE 75 MG: 75 TABLET ORAL at 09:06

## 2024-06-25 RX ADMIN — CARVEDILOL 25 MG: 25 TABLET, FILM COATED ORAL at 08:06

## 2024-06-25 RX ADMIN — POTASSIUM CHLORIDE 40 MEQ: 1500 TABLET, EXTENDED RELEASE ORAL at 09:06

## 2024-06-25 RX ADMIN — NITROGLYCERIN 0.4 MG: 0.4 TABLET, ORALLY DISINTEGRATING SUBLINGUAL at 12:06

## 2024-06-25 RX ADMIN — RANOLAZINE 500 MG: 500 TABLET, FILM COATED, EXTENDED RELEASE ORAL at 09:06

## 2024-06-25 RX ADMIN — CARVEDILOL 25 MG: 25 TABLET, FILM COATED ORAL at 09:06

## 2024-06-25 NOTE — ASSESSMENT & PLAN NOTE
- known CAD with prior stents (mid LAD/prox RCA PCI 3/2019, prox LCA in 10/2019)  - management as above for NSTEMI

## 2024-06-25 NOTE — PLAN OF CARE
Problem: Acute Coronary Syndrome  Goal: Optimal Adaptation to Illness  Outcome: Progressing  Goal: Absence of Cardiac-Related Pain  Intervention: Manage Cardiac Pain Symptoms  Flowsheets (Taken 6/25/2024 9660)  Chest Pain Intervention: cardiac monitoring continued

## 2024-06-25 NOTE — HPI
Domingo Gross is a 64 y.o. year old male with a history of CAD s/p PCI, PVD, ESRD on dialysis, hypertension, who presents with a chief complaint of chest pain. 3 hours prior to arrival to the ED he developed 4/10, non-radiating substernal chest pain that he described as an elephant sitting on his chest. He reports having multiple similar episodes (although less severe) during the week. He follows with Dr. Salazar with cardiology. Last stress test was a SPECT 3/2024 and was negative. Currently chest pain free since receiving nitroglycerin in the ED.

## 2024-06-25 NOTE — Clinical Note
650 ml of contrast were injected throughout the case. 100 mL of contrast was the total wasted during the case. 750 mL was the total amount used during the case.

## 2024-06-25 NOTE — PLAN OF CARE
SW met with Pt at bedside. Pt demographics confirmed. Pt independent with ADL's. Pt reports no need for HHS or DME. Pt not a Coumadin Pt. Pt receives dialysis TTS [facility]. Pt lives with spouse Karmen [phone].  Pt spouse will transport Pt home at D/C. No other needs identified. SW to request f/u as needed. SW to assist with any future needs.     Future Appointments   Date Time Provider Department Center   7/1/2024  3:00 PM Adelaide Puga MD [facility]   12/16/2024  1:40 PM Perez Bell DO [facility]       [place] - Telemetry  Discharge Assessment    Primary Care Provider: Perez Bell DO     Discharge Assessment (most recent)       BRIEF DISCHARGE ASSESSMENT - 06/25/24 9446          Discharge Planning    Resource/Environmental Concerns none (P)      Support Systems Spouse/significant other (P)      Equipment Currently Used at Home none (P)      Current Living Arrangements home (P)      Patient/Family Anticipates Transition to home;home with family (P)      Patient/Family Anticipated Services at Transition none (P)      DME Needed Upon Discharge  none (P)      Discharge Plan A Home (P)      Discharge Plan B Home with family (P)         Physical Activity    On average, how many days per week do you engage in moderate to strenuous exercise (like a brisk walk)? 0 days (P)      On average, how many minutes do you engage in exercise at this level? 0 min (P)         Financial Resource Strain    How hard is it for you to pay for the very basics like food, housing, medical care, and heating? Not very hard (P)         Housing Stability    In the last 12 months, was there a time when you were not able to pay the mortgage or rent on time? No (P)      At any time in the past 12 months, were you homeless or living in a shelter (including now)? No (P)         Transportation Needs    Has the lack of transportation kept you from medical appointments, meetings, work or from getting  things needed for daily living? No (P)         Food Insecurity    Within the past 12 months, you worried that your food would run out before you got the money to buy more. Never true (P)      Within the past 12 months, the food you bought just didn't last and you didn't have money to get more. Never true (P)         Stress    Do you feel stress - tense, restless, nervous, or anxious, or unable to sleep at night because your mind is troubled all the time - these days? Not at all (P)         Social Isolation    How often do you feel lonely or isolated from those around you?  Rarely (P)         Alcohol Use    Q1: How often do you have a drink containing alcohol? Never (P)      Q2: How many drinks containing alcohol do you have on a typical day when you are drinking? Patient does not drink (P)      Q3: How often do you have six or more drinks on one occasion? Never (P)         Utilities    In the past 12 months has the electric, gas, oil, or water company threatened to shut off services in your home? No (P)         Health Literacy    How often do you need to have someone help you when you read instructions, pamphlets, or other written material from your doctor or pharmacy? Never (P)                          Bobbi Nielson LMSW  Phone: [phone]  [facility]

## 2024-06-25 NOTE — PLAN OF CARE
Problem: Adult Inpatient Plan of Care  Goal: Plan of Care Review  Outcome: Progressing   VN note:   Patient's chart, labs and vital signs reviewed, will be available to intervene if needed.     VN attempted to speak to pt by telephone, introduce self no answer at this time.

## 2024-06-25 NOTE — ED NOTES
Dr. Ocasio assessed, Sushma VALENZUELA placed 18g PIV KHUSHBU.   For information on Fall & Injury Prevention, visit: [email or web address] OR  [email or web address] OR  https://www.cdc.gov/steadi/patient.html

## 2024-06-25 NOTE — Clinical Note
RN returned from break and cares assumed.    The catheter was repositioned into the ostium   right coronary artery. An angiography was performed of the right coronary arteries. Multiple views were taken. The angiography was performed via hand injection with 20 mL of contrast.

## 2024-06-25 NOTE — ED NOTES
Pt presents to ED for left side CP x 3hrs, is a dialysis pt MWF. Was given 325aspirin and 2nitro en route by EMS.     APPEARANCE: Alert, oriented x 4, and appears uncomfortable.  NEURO: 5/5 strength major flexors/extensors bilaterally. Sensory intact to light touch bilaterally. [place] coma scale: eyes open spontaneously-4, oriented & converses-5, obeys commands-6. No neurological abnormalities. Denies HA, dizziness, photophobia.  CARDIAC: +CP Normal rate and rhythm through apical/radial pulse, S1 and S2 noted.  RESPIRATORY: +SOB Normal rate and effort, Respirations are equal and unlabored, no use of accessory muscles.  PERIPHERAL VASCULAR: +2 peripheral pulses present. Normal cap refill <3sec. No edema. Warm to touch.   GASTRO: +mild distention Abdomen soft, flat, bowel sounds normal and active in all 4 quadrants, no tenderness Denies NVD.  MUSC: Full ROM. No bony tenderness or soft tissue tenderness. No obvious deformity.  SKIN: +LUE fistula with thrill and bruit Skin is warm and dry, normal skin turgor, mucous membranes moist.   GENITOURINARY: Voids spontaneously, denies itching, burning, hematuria.    Patient hospital gowned. Side rails x 2, bed low and locked position, call light in reach and instructed how to use. CM/BP/POX cont'd.

## 2024-06-25 NOTE — Clinical Note
An angiography was performed of the R branchial artery. The angiography was performed via hand injection with 5 mL of contrast.

## 2024-06-25 NOTE — CONSULTS
[place] - Emergency Dept  Cardiology  Consult Note    Patient Name: Domingo Gross  MRN: [id number]  Admission Date: 6/25/2024  Hospital Length of Stay: 0 days  Code Status: Full Code   Attending Provider: Yolanda Zambrano MD   Consulting Provider: Karlos Zimmerman NP  Primary Care Physician: Perez Bell DO  Principal Problem:<principal problem not specified>    Patient information was obtained from patient, past medical records, and ER records.     Inpatient consult to [facility]iology[facility]  Consult performed by: Karlos Zimmerman NP  Consult ordered by: Nehemias Caldera MD        Subjective:     Chief Complaint:  Chest pain      HPI:   Domingo Gross is a 64 y.o. year old male with AF (on Eliquis), COPD, CAD s/p PCI, PVD, ESRD on dialysis, hypertension. Patient presented to the ED with CP x 3 hours described as an elephant on his chest. Pain was similar to prior MIs. Patient reports less severe CP over the past week. Patient denies diaphoresis, palpitations, SOB above baseline, edema. EKG SR LBBB. Currently CP free after administration of NTG.  Troponin elevated 0.6-0.7. TTE pending. Last dose of Eliquis was yesterday. He is on DAPT, statin, BB, and heparin gtt. NPO p MN for LHC on 06/26/2024.      Past Medical History:   Diagnosis Date    Allergy     Anemia     Anticoagulant long-term use     Arthritis     CKD (chronic kidney disease) stage 4, GFR 15-29 ml/min     COPD (chronic obstructive pulmonary disease) 08/20/2021    Coronary artery disease     Heart attack 10/04/2019    Hematuria     Hemothorax     Hyperlipidemia     Hypertension     Hyperuricemia     Hypocalcemia     Hypokalemia     Hypophosphatemia     Hypothyroidism 3/2/2023    PAD (peripheral artery disease)     Proteinuria     Vitamin D deficiency        Past Surgical History:   Procedure Laterality Date    ABDOMINAL AORTOGRAPHY N/A 5/10/2019    Procedure: AORTOGRAM-ABDOMINAL;  Surgeon: Keo Jenkins MD;  Location: [facility] CATH LAB/EP;   Service: Cardiology;  Laterality: N/A;  RSFA intervention     AORTOGRAPHY WITH SERIALOGRAPHY N/A 12/3/2021    Procedure: AORTOGRAM, WITH SERIALOGRAPHY;  Surgeon: Keo Jenkins MD;  Location: [facility] CATH LAB/EP;  Service: Cardiology;  Laterality: N/A;    AORTOGRAPHY WITH SERIALOGRAPHY N/A 4/1/2022    Procedure: AORTOGRAM, WITH SERIALOGRAPHY;  Surgeon: Keo Jenkins MD;  Location: [facility] [place] CATH LAB/EP;  Service: Cardiology;  Laterality: N/A;    ATHERECTOMY, CORONARY N/A 4/25/2023    Procedure: Atherectomy-coronary;  Surgeon: Keo Jenkins MD;  Location: [facility] [place] CATH LAB/EP;  Service: Cardiology;  Laterality: N/A;    BONE MARROW BIOPSY Right 10/12/2021    Procedure: BIOPSY-BONE MARROW;  Surgeon: Naseem Woods MD;  Location: [facility] OR;  Service: Oncology;  Laterality: Right;    CARDIAC CATHETERIZATION      CATARACT EXTRACTION      CATARACT EXTRACTION W/  INTRAOCULAR LENS IMPLANT Right 6/8/2020    Procedure: EXTRACTION, CATARACT, WITH IOL INSERTION;  Surgeon: Tin Light MD;  Location: [facility];  Service: Ophthalmology;  Laterality: Right;    CATARACT EXTRACTION W/  INTRAOCULAR LENS IMPLANT Left 7/2/2020    Procedure: EXTRACTION, CATARACT, WITH IOL INSERTION;  Surgeon: Tin Light MD;  Location: [facility];  Service: Ophthalmology;  Laterality: Left;    COLONOSCOPY N/A 1/6/2022    Procedure: COLONOSCOPY;  Surgeon: Kathe Penaloza MD;  Location: [address]);  Service: Endoscopy;  Laterality: N/A;  COVID test at [facility] on 1/3-GT  okay to hold Plavix for 5 days and aspirin per Dr. Becerra  2nd floor due toextensive cardiac history   instructions mailed and my [facility] portal -     CORONARY ANGIOGRAPHY N/A 3/29/2019    Procedure: ANGIOGRAM, CORONARY ARTERY;  Surgeon: Keo Jenkins MD;  Location: [facility] [place] CATH LAB/EP;  Service: Cardiology;  Laterality: N/A;    CORONARY ANGIOGRAPHY Left 10/11/2019    Procedure: ANGIOGRAM, CORONARY ARTERY;  Surgeon: Keo Jenkins MD;  Location: [facility] CATH LAB/EP;  Service:  Cardiology;  Laterality: Left;    CORONARY ANGIOGRAPHY N/A 4/10/2023    Procedure: ANGIOGRAM, CORONARY ARTERY;  Surgeon: Keo Jenkins MD;  Location: [facility] CATH LAB/EP;  Service: Cardiology;  Laterality: N/A;    CORONARY ANGIOPLASTY WITH STENT PLACEMENT  03/29/2019    mid and distal RCA    ESOPHAGOGASTRODUODENOSCOPY N/A 1/6/2022    Procedure: EGD (ESOPHAGOGASTRODUODENOSCOPY);  Surgeon: Kathe Penaloza MD;  Location: [address]);  Service: Endoscopy;  Laterality: N/A;    EYE SURGERY      INSERTION OF TUNNELED CENTRAL VENOUS HEMODIALYSIS CATHETER N/A 2/9/2024    Procedure: INSERTION, CATHETER, HEMODIALYSIS, DUAL LUMEN;  Surgeon: Wang Mendieta MD;  Location: [facility] OR;  Service: General;  Laterality: N/A;    INSTANTANEOUS WAVE-FREE RATIO (IFR) N/A 4/25/2023    Procedure: Instantaneous Wave-Free Ratio (IFR);  Surgeon: Keo Jenkins MD;  Location: [facility] CATH LAB/EP;  Service: Cardiology;  Laterality: N/A;    INTRAVASCULAR ULTRASOUND, NON-CORONARY  8/12/2022    Procedure: Intravascular Ultrasound, Non-Coronary;  Surgeon: Keo Jenkins MD;  Location: [facility] CATH LAB/EP;  Service: Cardiology;;    IVUS, CORONARY  4/25/2023    Procedure: IVUS, Coronary;  Surgeon: Keo Jenkins MD;  Location: [facility] CATH LAB/EP;  Service: Cardiology;;    IVUS, CORONARY  5/16/2023    Procedure: IVUS, Coronary;  Surgeon: Keo Jenkins MD;  Location: [facility] CATH LAB/EP;  Service: Cardiology;;  CX    LEFT HEART CATHETERIZATION N/A 3/29/2019    Procedure: Left heart cath;  Surgeon: eKo Jenkins MD;  Location: [facility] CATH LAB/EP;  Service: Cardiology;  Laterality: N/A;    LEFT HEART CATHETERIZATION Left 10/8/2019    Procedure: Left heart cath;  Surgeon: Keo Jenkins MD;  Location: [facility] CATH LAB/EP;  Service: Cardiology;  Laterality: Left;    LEFT HEART CATHETERIZATION Left 4/10/2023    Procedure: Left heart cath;  Surgeon: Keo Jenkins MD;  Location: [facility] CATH LAB/EP;  Service: Cardiology;  Laterality: Left;    LEFT HEART  CATHETERIZATION Left 4/25/2023    Procedure: Left heart cath;  Surgeon: Keo Jenkins MD;  Location: [facility] CATH LAB/EP;  Service: Cardiology;  Laterality: Left;    LEFT HEART CATHETERIZATION Left 5/16/2023    Procedure: Left heart cath;  Surgeon: Keo Jenkins MD;  Location: [facility] CATH LAB/EP;  Service: Cardiology;  Laterality: Left;    PERCUTANEOUS TRANSLUMINAL ANGIOPLASTY (PTA) OF PERIPHERAL VESSEL N/A 7/12/2019    Procedure: PTA, PERIPHERAL VESSEL;  Surgeon: Keo Jenkins MD;  Location: [facility] CATH LAB/EP;  Service: Cardiology;  Laterality: N/A;    PERCUTANEOUS TRANSLUMINAL ANGIOPLASTY (PTA) OF PERIPHERAL VESSEL Left 5/20/2022    Procedure: PTA, PERIPHERAL VESSEL;  Surgeon: Keo Jenkins MD;  Location: [facility] CATH LAB/EP;  Service: Cardiology;  Laterality: Left;    PERCUTANEOUS TRANSLUMINAL ANGIOPLASTY (PTA) OF PERIPHERAL VESSEL Right 8/12/2022    Procedure: PTA, PERIPHERAL VESSEL;  Surgeon: Keo Jenkins MD;  Location: [facility] CATH LAB/EP;  Service: Cardiology;  Laterality: Right;    PERCUTANEOUS TRANSLUMINAL BALLOON ANGIOPLASTY OF CORONARY ARTERY  4/25/2023    Procedure: Angioplasty-coronary;  Surgeon: Keo Jenkins MD;  Location: [facility] CATH LAB/EP;  Service: Cardiology;;    PLACEMENT OF ARTERIOVENOUS GRAFT Left 4/15/2024    Procedure: INSERTION, GRAFT, ARTERIOVENOUS;  Surgeon: Scott Pascual MD;  Location: [facility];  Service: General;  Laterality: Left;    PTCA, SINGLE VESSEL  5/16/2023    Procedure: PTCA, Single Vessel;  Surgeon: Keo Jenkins MD;  Location: [facility] CATH LAB/EP;  Service: Cardiology;;  CX    REMOVAL OF HEMODIALYSIS CATHETER Right 2/9/2024    Procedure: REMOVAL, CATHETER, HEMODIALYSIS;  Surgeon: Wang Mendieta MD;  Location: [facility] OR;  Service: General;  Laterality: Right;    REMOVAL OF TUNNELED CENTRAL VENOUS CATHETER (CVC) Right 5/20/2024    Procedure: REMOVAL, CATHETER, CENTRAL VENOUS, TUNNELED;  Surgeon: Scott Pascual MD;  Location: [facility] OR;  Service: General;  Laterality:  Right;    STENT, DRUG ELUTING, SINGLE VESSEL, CORONARY  4/25/2023    Procedure: Stent, Drug Eluting, Single Vessel, Coronary;  Surgeon: Keo Jenkins MD;  Location: [facility] CATH LAB/EP;  Service: Cardiology;;    STENT, DRUG ELUTING, SINGLE VESSEL, CORONARY  5/16/2023    Procedure: Stent, Drug Eluting, Single Vessel, Coronary;  Surgeon: Keo Jenkins MD;  Location: [facility] CATH LAB/EP;  Service: Cardiology;;  CX    TRANSESOPHAGEAL ECHOCARDIOGRAM WITH POSSIBLE CARDIOVERSION (JOSE W/ POSS CARDIOVERSION) N/A 6/19/2023    Procedure: Transesophageal echo (JOSE) intra-procedure log documentation;  Surgeon: Keo Jenkins MD;  Location: [facility] CATH LAB/EP;  Service: Cardiology;  Laterality: N/A;       Review of patient's allergies indicates:   Allergen Reactions    Ace inhibitors Rash       Current Facility-Administered Medications on File Prior to Encounter   Medication    sodium chloride 0.9% infusion     Current Outpatient Medications on File Prior to Encounter   Medication Sig    albuterol (PROVENTIL/VENTOLIN HFA) 90 mcg/actuation inhaler INHALE 2 PUFFS INTO THE LUNGS EVERY 6 HOURS AS NEEDED FOR WHEEZING    albuterol-ipratropium (DUO-NEB) 2.5 mg-0.5 mg/3 mL nebulizer solution Take 3 mLs by nebulization every 6 (six) hours as needed for Wheezing or Shortness of Breath (or cough). Rescue    amLODIPine (NORVASC) 5 MG tablet Take 1 tablet (5 mg total) by mouth once daily.    apixaban (ELIQUIS) 5 mg Tab Take 1 tablet (5 mg total) by mouth 2 (two) times daily.    carvediloL (COREG) 25 MG tablet Take 1 tablet (25 mg total) by mouth 2 (two) times daily with meals.    clopidogreL (PLAVIX) 75 mg tablet Take 1 tablet (75 mg total) by mouth once daily.    coenzyme Q10 100 mg capsule Take 100 mg by mouth once daily.    eplerenone (INSPRA) 50 MG Tab Take 1 tablet (50 mg total) by mouth once daily.    epoetin robi-epbx (RETACRIT) 40,000 unit/mL injection Inject 0.1 mLs (4,000 Units total) into the skin every 30 days.    hydrALAZINE  (APRESOLINE) 50 MG tablet Take 1 tablet (50 mg total) by mouth every 8 (eight) hours.    HYDROcodone-acetaminophen (NORCO) 5-325 mg per tablet Take 1 tablet by mouth every 6 (six) hours as needed for Pain.    HYDROcodone-acetaminophen (NORCO) 5-325 mg per tablet Take 1 tablet by mouth every 6 (six) hours as needed for Pain.    hydrOXYzine pamoate (VISTARIL) 25 MG Cap TAKE 1 CAPSULE(25 MG) BY MOUTH EVERY NIGHT AS NEEDED FOR INSOMNIA OR ANXIETY    isosorbide mononitrate (IMDUR) 30 MG 24 hr tablet Take 1 tablet (30 mg total) by mouth once daily.    levothyroxine (SYNTHROID) 50 MCG tablet Take 50 mcg by mouth every morning.    levothyroxine (SYNTHROID) 75 MCG tablet Take 1 tablet (75 mcg total) by mouth before breakfast.    LIDOcaine (LIDODERM) 5 % Place 1 patch onto the skin daily as needed (back pain). Remove & Discard patch within 12 hours or as directed by MD    nitroGLYCERIN (NITROSTAT) 0.4 MG SL tablet Place 1 tablet (0.4 mg total) under the tongue every 5 (five) minutes as needed for Chest pain.    ranolazine (RANEXA) 500 MG Tb12 Take 1 tablet (500 mg total) by mouth 2 (two) times daily.    rosuvastatin (CRESTOR) 40 MG Tab Take 1 tablet (40 mg total) by mouth every evening.    sodium bicarbonate 650 MG tablet Take 1 tablet (650 mg total) by mouth once daily. for 30 doses     Family History       Problem Relation (Age of Onset)    Aneurysm Mother    Cancer Father    Diabetes Sister    Heart disease Mother    Hypertension Mother, Sister    No Known Problems Brother, Son          Tobacco Use    Smoking status: Former     Current packs/day: 0.00     Types: Cigarettes     Quit date: 1999     Years since quittin.1    Smokeless tobacco: Never   Substance and Sexual Activity    Alcohol use: No     Alcohol/week: 0.0 standard drinks of alcohol    Drug use: No    Sexual activity: Yes     Partners: Female     Review of Systems   Constitutional: Negative. Negative for diaphoresis.   HENT: Negative.     Eyes:  Negative.    Cardiovascular:  Positive for chest pain and irregular heartbeat. Negative for leg swelling, near-syncope, orthopnea, palpitations, paroxysmal nocturnal dyspnea and syncope.   Respiratory: Negative.  Negative for cough and shortness of breath.    Endocrine: Negative.    Hematologic/Lymphatic: Negative.    Musculoskeletal: Negative.    Gastrointestinal:  Negative for nausea and vomiting.   Genitourinary: Negative.    Neurological: Negative.    Psychiatric/Behavioral: Negative.     Allergic/Immunologic: Negative.      Objective:     Vital Signs (Most Recent):  Temp: 98.2 °F (36.8 °C) (06/25/24 0700)  Pulse: 65 (06/25/24 0832)  Resp: 17 (06/25/24 0832)  BP: (!) 143/65 (06/25/24 0832)  SpO2: 95 % (06/25/24 0832) Vital Signs (24h Range):  Temp:  [98.2 °F (36.8 °C)-98.3 °F (36.8 °C)] 98.2 °F (36.8 °C)  Pulse:  [63-78] 65  Resp:  [17-25] 17  SpO2:  [95 %-98 %] 95 %  BP: (120-146)/(58-67) 143/65     Weight: 81.6 kg (180 lb)  Body mass index is 25.83 kg/m².    SpO2: 95 %         Intake/Output Summary (Last 24 hours) at 6/25/2024 0943  Last data filed at 6/25/2024 0027  Gross per 24 hour   Intake 150 ml   Output 300 ml   Net -150 ml       Lines/Drains/Airways       Central Venous Catheter Line  Duration             Tunneled Central Line Insertion/Assessment - Double Lumen  02/08/24 1000 137 days                     Physical Exam  Constitutional:       General: He is not in acute distress.     Appearance: He is not diaphoretic.   HENT:      Head: Atraumatic.   Eyes:      General:         Right eye: No discharge.         Left eye: No discharge.   Cardiovascular:      Rate and Rhythm: Normal rate and regular rhythm.   Pulmonary:      Effort: Pulmonary effort is normal.      Breath sounds: Normal breath sounds.   Abdominal:      General: Bowel sounds are normal.      Palpations: Abdomen is soft.   Musculoskeletal:      Right lower leg: No edema.      Left lower leg: No edema.   Skin:     General: Skin is warm and  dry.   Neurological:      Mental Status: He is alert and oriented to person, place, and time.   Psychiatric:         Mood and Affect: Mood normal.         Behavior: Behavior normal.         Thought Content: Thought content normal.         Judgment: Judgment normal.          Significant Labs: BMP:   Recent Labs   Lab 06/25/24  0038   GLU 96      K 3.0*      CO2 22*   BUN 35*   CREATININE 4.6*   CALCIUM 8.8   , CMP   Recent Labs   Lab 06/25/24  0038      K 3.0*      CO2 22*   GLU 96   BUN 35*   CREATININE 4.6*   CALCIUM 8.8   PROT 6.9   ALBUMIN 3.1*   BILITOT 0.5   ALKPHOS 64   AST 17   ALT 8*   ANIONGAP 14   , CBC   Recent Labs   Lab 06/25/24  0038 06/25/24  0744   WBC 6.58 5.71   HGB 8.5* 8.1*   HCT 25.7* 24.9*    157   , INR   Recent Labs   Lab 06/25/24  0038 06/25/24  0744   INR 1.3* 1.3*   , Lipid Panel   Recent Labs   Lab 06/25/24  0038   CHOL 129   HDL 63   LDLCALC 55.2*   TRIG 54   CHOLHDL 48.8   , Troponin   Recent Labs   Lab 06/25/24  0038 06/25/24  0433 06/25/24  0744   TROPONINI 0.680* 0.708* 0.755*   , and All pertinent lab results from the last 24 hours have been reviewed.    Significant Imaging: Echocardiogram: Transthoracic echo (TTE) complete ([facility] Only):   Results for orders placed or performed during the hospital encounter of 06/25/24   Echo   Result Value Ref Range    BSA 2.01 m2    Child's Biplane MOD Ejection Fraction 41 %    A2C EF 42 %    A4C EF 43 %    LVOT stroke volume 96.52 cm3    LVIDd 6.06 (A) 3.5 - 6.0 cm    LV Systolic Volume 111.46 mL    LV Systolic Volume Index 55.7 mL/m2    LVIDs 4.87 (A) 2.1 - 4.0 cm    LV ESV A2C 84.19 mL    LV Diastolic Volume 184.48 mL    LV ESV A4C 34.93 mL    LV Diastolic Volume Index 92.24 mL/m2    LV EDV A2C 139.311375572971888 mL    LV EDV A4C 132.34 mL    Left Ventricular End Systolic Volume by Teichholz Method 111.46 mL    Left Ventricular End Diastolic Volume by Teichholz Method 184.48 mL    IVS 1.22 (A) 0.6 - 1.1 cm     "LVOT diameter 2.13 cm    LVOT area 3.6 cm2    FS 20 (A) 28 - 44 %    Left Ventricle Relative Wall Thickness 0.37 cm    Posterior Wall 1.13 (A) 0.6 - 1.1 cm    LV mass 310.34 g    LV Mass Index 155 g/m2    MV Peak E Jakob 1.41 m/s    TDI LATERAL 0.08 m/s    TDI SEPTAL 0.05 m/s    E/E' ratio 21.69 m/s    MV Peak A Jakob 0.37 m/s    TR Max Jakob 3.11 m/s    E/A ratio 3.81     E wave deceleration time 175.32 msec    MV "A" wave duration 82.198538904911277 msec    LV SEPTAL E/E' RATIO 28.20 m/s    LV LATERAL E/E' RATIO 17.63 m/s    PV Peak S Jakob 0.54 m/s    PV Peak D Jakob 0.91 m/s    Pulm vein S/D ratio 0.59     LVOT peak jakob 1.30 m/s    Left Ventricular Outflow Tract Mean Velocity 0.78 cm/s    Left Ventricular Outflow Tract Mean Gradient 2.93 mmHg    RV S' 12.41 cm/s    TAPSE 2.45 cm    LA size 3.86 cm    Left Atrium Minor Axis 5.93 cm    Left Atrium Major Axis 5.26 cm    LA volume (mod) 58.18 cm3    LA Volume Index (Mod) 29.1 mL/m2    RA Major Axis 4.80 cm    RA Width 4.37 cm    Vn Nyquist MS 0.37 m/s    AV mean gradient 5 mmHg    AV peak gradient 9 mmHg    Ao peak jakob 1.47 m/s    Ao VTI 33.20 cm    LVOT peak VTI 27.10 cm    AV valve area 2.91 cm²    AV Velocity Ratio 0.88     AV index (prosthetic) 0.82     JILL by Velocity Ratio 3.15 cm²    Radius 0.94 cm    Vn Nyquist 0.37 m/s    MV mean gradient 2 mmHg    MV peak gradient 8 mmHg    MV stenosis pressure 1/2 time 50.84 ms    MV valve area p 1/2 method 4.33 cm2    MV valve area by continuity eq 3.04 cm2    MV VTI 31.7 cm    Triscuspid Valve Regurgitation Peak Gradient 39 mmHg    PV PEAK VELOCITY 1.18 m/s    PV peak gradient 6 mmHg    Pulmonary Valve Mean Velocity 0.80 m/s    Sinus 3.89 cm    STJ 3.40 cm    Ascending aorta 3.44 cm    IVC diameter 2.05 cm    Mean e' 0.07 m/s    ZLVIDS 2.44     ZLVIDD 0.39     LA area A4C 15.13 cm2    LA area A2C 25.69 cm2    LA Volume Index 31.1 mL/m2    LA volume 62.19 cm3    LA WIDTH 3.4 cm     Assessment and Plan:     NSTEMI (non-ST elevated " myocardial infarction)  Per CAD    ESRD (end stage renal disease) on dialysis  Nephrology on board  Please plan for HD post LHC on Wednesday     Paroxysmal atrial fibrillation  Currently in SR  Eliquis on hold for LHC- will resume prior to DC  Continue heparin gtt and BB     Coronary artery disease involving native coronary artery of native heart with angina pectoris with documented spasm  Patient with extensive CAD history   Presented with typical cardiac chest pain   Troponin 0.6, 0.7- trend until it peaks  NPO p MN for LHC on 06/26/2024 (currently CP free. Last dose of Eliquis was yesterday)  Continue DAPT, statin, Heparin gtt, Imdur, Ranexa  TTE pending     Hyperlipidemia    High intensity statin on board - continue     HTN (hypertension)  BP controlled  Continue Norvasc, BB, Hydralazine, Aldactone. Up titrate PRN         VTE Risk Mitigation (From admission, onward)           Ordered     heparin 25,000 units in dextrose 5% (100 units/ml) IV bolus from bag LOW INTENSITY nomogram - [facility]  As needed (PRN)        Question:  Heparin Infusion Adjustment (DO NOT MODIFY ANSWER)  Answer:  \\[email or web address]\epic\Images\Pharmacy\HeparinInfusions\heparin LOW INTENSITY nomogram for [facility] [id number]    06/25/24 0658     heparin 25,000 units in dextrose 5% (100 units/ml) IV bolus from bag LOW INTENSITY nomogram - [facility]  As needed (PRN)        Question:  Heparin Infusion Adjustment (DO NOT MODIFY ANSWER)  Answer:  \\[email or web address]\epic\Images\Pharmacy\HeparinInfusions\heparin LOW INTENSITY nomogram for [facility] [id number]    06/25/24 0658     heparin 25,000 units in dextrose 5% 250 mL (100 units/mL) infusion LOW INTENSITY nomogram - [facility]  Continuous        Question:  Begin at (units/kg/hr)  Answer:  12    06/25/24 0658     IP VTE HIGH RISK PATIENT  Once         06/25/24 0233     Place sequential compression device  Until discontinued         06/25/24 0233                    Thank you for your consult. I will follow-up with patient. Please contact  us if you have any additional questions.    Karlos Zimmerman NP  Cardiology   [facility] - Emergency Dept

## 2024-06-25 NOTE — CONSULTS
NEPHROLOGY CONSULT NOTE    HPI & INTERVAL HISTORY:    Past Medical History:   Diagnosis Date    Allergy     Anemia     Anticoagulant long-term use     Arthritis     CKD (chronic kidney disease) stage 4, GFR 15-29 ml/min     COPD (chronic obstructive pulmonary disease) 08/20/2021    Coronary artery disease     Heart attack 10/04/2019    Hematuria     Hemothorax     Hyperlipidemia     Hypertension     Hyperuricemia     Hypocalcemia     Hypokalemia     Hypophosphatemia     Hypothyroidism 3/2/2023    PAD (peripheral artery disease)     Proteinuria     Vitamin D deficiency       Past Surgical History:   Procedure Laterality Date    ABDOMINAL AORTOGRAPHY N/A 5/10/2019    Procedure: AORTOGRAM-ABDOMINAL;  Surgeon: Keo Jenkins MD;  Location: [facility] CATH LAB/EP;  Service: Cardiology;  Laterality: N/A;  RSFA intervention     AORTOGRAPHY WITH SERIALOGRAPHY N/A 12/3/2021    Procedure: AORTOGRAM, WITH SERIALOGRAPHY;  Surgeon: Keo Jenkins MD;  Location: [facility] CATH LAB/EP;  Service: Cardiology;  Laterality: N/A;    AORTOGRAPHY WITH SERIALOGRAPHY N/A 4/1/2022    Procedure: AORTOGRAM, WITH SERIALOGRAPHY;  Surgeon: Koe Jenkins MD;  Location: [facility] CATH LAB/EP;  Service: Cardiology;  Laterality: N/A;    ATHERECTOMY, CORONARY N/A 4/25/2023    Procedure: Atherectomy-coronary;  Surgeon: Keo Jenkins MD;  Location: [facility] CATH LAB/EP;  Service: Cardiology;  Laterality: N/A;    BONE MARROW BIOPSY Right 10/12/2021    Procedure: BIOPSY-BONE MARROW;  Surgeon: Naseem Woods MD;  Location: [facility] OR;  Service: Oncology;  Laterality: Right;    CARDIAC CATHETERIZATION      CATARACT EXTRACTION      CATARACT EXTRACTION W/  INTRAOCULAR LENS IMPLANT Right 6/8/2020    Procedure: EXTRACTION, CATARACT, WITH IOL INSERTION;  Surgeon: Tin Light MD;  Location: [facility] OR;  Service: Ophthalmology;  Laterality: Right;    CATARACT EXTRACTION W/  INTRAOCULAR LENS IMPLANT Left 7/2/2020    Procedure: EXTRACTION, CATARACT, WITH IOL INSERTION;  Surgeon:  Tin Light MD;  Location: [facility] OR;  Service: Ophthalmology;  Laterality: Left;    COLONOSCOPY N/A 1/6/2022    Procedure: COLONOSCOPY;  Surgeon: Kathe Penaloza MD;  Location: [facility] ENDO (2ND FLR);  Service: Endoscopy;  Laterality: N/A;  COVID test at [facility] on 1/3-GT  okay to hold Plavix for 5 days and aspirin per Dr. Becerra  2nd floor due toextensive cardiac history   instructions mailed and my [facility] portal -     CORONARY ANGIOGRAPHY N/A 3/29/2019    Procedure: ANGIOGRAM, CORONARY ARTERY;  Surgeon: Keo Jenkins MD;  Location: [facility] CATH LAB/EP;  Service: Cardiology;  Laterality: N/A;    CORONARY ANGIOGRAPHY Left 10/11/2019    Procedure: ANGIOGRAM, CORONARY ARTERY;  Surgeon: Keo Jenkins MD;  Location: [facility] CATH LAB/EP;  Service: Cardiology;  Laterality: Left;    CORONARY ANGIOGRAPHY N/A 4/10/2023    Procedure: ANGIOGRAM, CORONARY ARTERY;  Surgeon: Keo Jenkins MD;  Location: [facility] CATH LAB/EP;  Service: Cardiology;  Laterality: N/A;    CORONARY ANGIOPLASTY WITH STENT PLACEMENT  03/29/2019    mid and distal RCA    ESOPHAGOGASTRODUODENOSCOPY N/A 1/6/2022    Procedure: EGD (ESOPHAGOGASTRODUODENOSCOPY);  Surgeon: Kathe Penaloza MD;  Location: [facility] (2ND FLR);  Service: Endoscopy;  Laterality: N/A;    EYE SURGERY      INSERTION OF TUNNELED CENTRAL VENOUS HEMODIALYSIS CATHETER N/A 2/9/2024    Procedure: INSERTION, CATHETER, HEMODIALYSIS, DUAL LUMEN;  Surgeon: Wang Mendieta MD;  Location: [facility] OR;  Service: General;  Laterality: N/A;    INSTANTANEOUS WAVE-FREE RATIO (IFR) N/A 4/25/2023    Procedure: Instantaneous Wave-Free Ratio (IFR);  Surgeon: Keo Jenkins MD;  Location: [facility] CATH LAB/EP;  Service: Cardiology;  Laterality: N/A;    INTRAVASCULAR ULTRASOUND, NON-CORONARY  8/12/2022    Procedure: Intravascular Ultrasound, Non-Coronary;  Surgeon: Keo Jenkins MD;  Location: [facility] CATH LAB/EP;  Service: Cardiology;;    IVUS, CORONARY  4/25/2023    Procedure: IVUS, Coronary;  Surgeon: Keo KYLE  MD Gregory;  Location: [facility] CATH LAB/EP;  Service: Cardiology;;    IVUS, CORONARY  5/16/2023    Procedure: IVUS, Coronary;  Surgeon: Keo Jenkins MD;  Location: [facility] CATH LAB/EP;  Service: Cardiology;;  CX    LEFT HEART CATHETERIZATION N/A 3/29/2019    Procedure: Left heart cath;  Surgeon: Keo Jenkins MD;  Location: [facility] CATH LAB/EP;  Service: Cardiology;  Laterality: N/A;    LEFT HEART CATHETERIZATION Left 10/8/2019    Procedure: Left heart cath;  Surgeon: Keo Jenkins MD;  Location: [facility] CATH LAB/EP;  Service: Cardiology;  Laterality: Left;    LEFT HEART CATHETERIZATION Left 4/10/2023    Procedure: Left heart cath;  Surgeon: Keo Jenkins MD;  Location: [facility] CATH LAB/EP;  Service: Cardiology;  Laterality: Left;    LEFT HEART CATHETERIZATION Left 4/25/2023    Procedure: Left heart cath;  Surgeon: Keo Jenkins MD;  Location: [facility] CATH LAB/EP;  Service: Cardiology;  Laterality: Left;    LEFT HEART CATHETERIZATION Left 5/16/2023    Procedure: Left heart cath;  Surgeon: Keo Jenkins MD;  Location: [facility] CATH LAB/EP;  Service: Cardiology;  Laterality: Left;    PERCUTANEOUS TRANSLUMINAL ANGIOPLASTY (PTA) OF PERIPHERAL VESSEL N/A 7/12/2019    Procedure: PTA, PERIPHERAL VESSEL;  Surgeon: Keo Jenkins MD;  Location: [facility] CATH LAB/EP;  Service: Cardiology;  Laterality: N/A;    PERCUTANEOUS TRANSLUMINAL ANGIOPLASTY (PTA) OF PERIPHERAL VESSEL Left 5/20/2022    Procedure: PTA, PERIPHERAL VESSEL;  Surgeon: Keo Jenkins MD;  Location: [facility] CATH LAB/EP;  Service: Cardiology;  Laterality: Left;    PERCUTANEOUS TRANSLUMINAL ANGIOPLASTY (PTA) OF PERIPHERAL VESSEL Right 8/12/2022    Procedure: PTA, PERIPHERAL VESSEL;  Surgeon: Keo Jenkins MD;  Location: [facility] CATH LAB/EP;  Service: Cardiology;  Laterality: Right;    PERCUTANEOUS TRANSLUMINAL BALLOON ANGIOPLASTY OF CORONARY ARTERY  4/25/2023    Procedure: Angioplasty-coronary;  Surgeon: Keo Jenkins MD;  Location: [facility] CATH LAB/EP;  Service: Cardiology;;     PLACEMENT OF ARTERIOVENOUS GRAFT Left 4/15/2024    Procedure: INSERTION, GRAFT, ARTERIOVENOUS;  Surgeon: Scott Pascual MD;  Location: [facility] OR;  Service: General;  Laterality: Left;    PTCA, SINGLE VESSEL  5/16/2023    Procedure: PTCA, Single Vessel;  Surgeon: Keo Jenkins MD;  Location: [facility] CATH LAB/EP;  Service: Cardiology;;  CX    REMOVAL OF HEMODIALYSIS CATHETER Right 2/9/2024    Procedure: REMOVAL, CATHETER, HEMODIALYSIS;  Surgeon: Wang Mendieta MD;  Location: [facility] OR;  Service: General;  Laterality: Right;    REMOVAL OF TUNNELED CENTRAL VENOUS CATHETER (CVC) Right 5/20/2024    Procedure: REMOVAL, CATHETER, CENTRAL VENOUS, TUNNELED;  Surgeon: Scott Pascual MD;  Location: [facility] OR;  Service: General;  Laterality: Right;    STENT, DRUG ELUTING, SINGLE VESSEL, CORONARY  4/25/2023    Procedure: Stent, Drug Eluting, Single Vessel, Coronary;  Surgeon: Keo Jenkins MD;  Location: [facility] CATH LAB/EP;  Service: Cardiology;;    STENT, DRUG ELUTING, SINGLE VESSEL, CORONARY  5/16/2023    Procedure: Stent, Drug Eluting, Single Vessel, Coronary;  Surgeon: Keo Jenkins MD;  Location: [facility] CATH LAB/EP;  Service: Cardiology;;  CX    TRANSESOPHAGEAL ECHOCARDIOGRAM WITH POSSIBLE CARDIOVERSION (JOSE W/ POSS CARDIOVERSION) N/A 6/19/2023    Procedure: Transesophageal echo (JOSE) intra-procedure log documentation;  Surgeon: Keo Jenkins MD;  Location: [facility] CATH LAB/EP;  Service: Cardiology;  Laterality: N/A;      Review of patient's allergies indicates:   Allergen Reactions    Ace inhibitors Rash      Medications Prior to Admission   Medication Sig Dispense Refill Last Dose    albuterol (PROVENTIL/VENTOLIN HFA) 90 mcg/actuation inhaler INHALE 2 PUFFS INTO THE LUNGS EVERY 6 HOURS AS NEEDED FOR WHEEZING 54 g 3 Past Month    albuterol-ipratropium (DUO-NEB) 2.5 mg-0.5 mg/3 mL nebulizer solution Take 3 mLs by nebulization every 6 (six) hours as needed for Wheezing or Shortness of Breath (or cough). Rescue 75  mL 3 Past Month    amLODIPine (NORVASC) 5 MG tablet Take 1 tablet (5 mg total) by mouth once daily. 90 tablet 3 6/24/2024    apixaban (ELIQUIS) 5 mg Tab Take 1 tablet (5 mg total) by mouth 2 (two) times daily. 180 tablet 3 6/24/2024    carvediloL (COREG) 25 MG tablet Take 1 tablet (25 mg total) by mouth 2 (two) times daily with meals. 180 tablet 3 6/24/2024    clopidogreL (PLAVIX) 75 mg tablet Take 1 tablet (75 mg total) by mouth once daily. 90 tablet 3 6/24/2024    coenzyme Q10 100 mg capsule Take 100 mg by mouth once daily.   6/24/2024    eplerenone (INSPRA) 50 MG Tab Take 1 tablet (50 mg total) by mouth once daily. 90 tablet 3 6/24/2024    hydrALAZINE (APRESOLINE) 50 MG tablet Take 1 tablet (50 mg total) by mouth every 8 (eight) hours. 270 tablet 3 6/24/2024    HYDROcodone-acetaminophen (NORCO) 5-325 mg per tablet Take 1 tablet by mouth every 6 (six) hours as needed for Pain. 24 tablet 0 Past Week    hydrOXYzine pamoate (VISTARIL) 25 MG Cap TAKE 1 CAPSULE(25 MG) BY MOUTH EVERY NIGHT AS NEEDED FOR INSOMNIA OR ANXIETY 30 capsule 0 Past Week    isosorbide mononitrate (IMDUR) 30 MG 24 hr tablet Take 1 tablet (30 mg total) by mouth once daily. 90 tablet 3 6/24/2024    levothyroxine (SYNTHROID) 75 MCG tablet Take 1 tablet (75 mcg total) by mouth before breakfast. 30 tablet 11 6/24/2024    LIDOcaine (XYLOCAINE) 5 % Oint ointment Apply topically as needed.       nitroGLYCERIN (NITROSTAT) 0.4 MG SL tablet Place 1 tablet (0.4 mg total) under the tongue every 5 (five) minutes as needed for Chest pain. 90 tablet 3     ranolazine (RANEXA) 500 MG Tb12 Take 1 tablet (500 mg total) by mouth 2 (two) times daily. 180 tablet 3 6/24/2024    rosuvastatin (CRESTOR) 40 MG Tab Take 1 tablet (40 mg total) by mouth every evening. 90 tablet 3 6/24/2024    sodium bicarbonate 650 MG tablet Take 1 tablet (650 mg total) by mouth once daily. for 30 doses (Patient taking differently: Take 650 mg by mouth 2 (two) times daily.) 30 tablet 3  2024    [DISCONTINUED] LIDOcaine (LIDODERM) 5 % Place 1 patch onto the skin daily as needed (back pain). Remove & Discard patch within 12 hours or as directed by MD 30 patch 0        Social History     Socioeconomic History    Marital status:    Occupational History     Employer: [employer]   Tobacco Use    Smoking status: Former     Current packs/day: 0.00     Types: Cigarettes     Quit date: 1999     Years since quittin.1    Smokeless tobacco: Never   Substance and Sexual Activity    Alcohol use: No     Alcohol/week: 0.0 standard drinks of alcohol    Drug use: No    Sexual activity: Yes     Partners: Female     Social Determinants of Health     Financial Resource Strain: Medium Risk (3/2/2024)    Overall Financial Resource Strain (CARDIA)     Difficulty of Paying Living Expenses: Somewhat hard   Food Insecurity: Food Insecurity Present (3/2/2024)    Hunger Vital Sign     Worried About Running Out of Food in the Last Year: Sometimes true     Ran Out of Food in the Last Year: Sometimes true   Transportation Needs: No Transportation Needs (3/2/2024)    PRAPARE - Transportation     Lack of Transportation (Medical): No     Lack of Transportation (Non-Medical): No   Physical Activity: Insufficiently Active (3/2/2024)    Exercise Vital Sign     Days of Exercise per Week: 4 days     Minutes of Exercise per Session: 10 min   Stress: No Stress Concern Present (3/2/2024)    [employer] - Occupational Stress Questionnaire     Feeling of Stress : Not at all   Housing Stability: High Risk (3/2/2024)    Housing Stability Vital Sign     Unable to Pay for Housing in the Last Year: No     Unstable Housing in the Last Year: Yes        MEDS   amLODIPine  5 mg Oral Daily    aspirin  81 mg Oral Daily    atorvastatin  80 mg Oral Daily    carvediloL  25 mg Oral BID    clopidogreL  75 mg Oral Daily    furosemide (LASIX) injection  40 mg Intravenous Once    hydrALAZINE  50 mg Oral Q8H  "   isosorbide mononitrate  30 mg Oral Daily    levothyroxine  75 mcg Oral Before breakfast    ranolazine  500 mg Oral BID    spironolactone  25 mg Oral Daily        ROS:          CONTINOUS INFUSIONS:      Intake/Output Summary (Last 24 hours) at 6/25/2024 1208  Last data filed at 6/25/2024 0027  Gross per 24 hour   Intake 150 ml   Output 300 ml   Net -150 ml        HEMODYNAMICS:    Temp:  [98.2 °F (36.8 °C)-98.3 °F (36.8 °C)] 98.2 °F (36.8 °C)  Pulse:  [63-78] 65  Resp:  [17-25] 17  SpO2:  [95 %-98 %] 95 %  BP: (120-146)/(58-67) 143/65   General:  Chest discomfort  No cough  No SOB  No fever  No chills  No abdominal pain  No diarrhea  No dysuria   Cardiology : Pulse 93  Pulmonary : diminished breath sounds  Abdomen soft   Extremities : No edema  Skin:dry   LABS   Lab Results   Component Value Date    WBC 5.71 06/25/2024    HGB 8.1 (L) 06/25/2024    HCT 24.9 (L) 06/25/2024    MCV 86 06/25/2024     06/25/2024        Recent Labs   Lab 06/25/24  0038   GLU 96   CALCIUM 8.8   ALBUMIN 3.1*   PROT 6.9      K 3.0*   CO2 22*      BUN 35*   CREATININE 4.6*   ALKPHOS 64   ALT 8*   AST 17   BILITOT 0.5      Lab Results   Component Value Date    .1 (H) 03/14/2024    CALCIUM 8.8 06/25/2024    CAION 1.31 07/14/2020    PHOS 2.5 (L) 03/14/2024      Lab Results   Component Value Date    IRON 59 03/14/2024    TIBC 386 03/14/2024    FERRITIN 674 (H) 03/14/2024        ABG  No results for input(s): "PH", "PO2", "PCO2", "HCO3", "BE" in the last 168 hours.      IMAGING:  CXR    ASSESSMENT / PLAN  NSTEMI  Paroxysmal atrial fibrillation   Coronary artery disease   Blood pressure 143/65  Troponin 0.755    LHC on 6/26/24    ESRD  Left arm AV graft  Metabolic bone disease  Poor nutrition  Albumin 3.1  Renal diet as tolerated  Dialysis tomorrow post C  "

## 2024-06-25 NOTE — ASSESSMENT & PLAN NOTE
- known hx of CAD presents with chest pain, elevated troponin  - EKG with LBBB, TWI V5-V6 (LBBB old, twi intermittently present on review of past EKG)  - aspirin loaded  - continue aspirin, plavix  - fully anticoagulated with apixiban. Will hold apixiban, transition to heparin gtt when next dose of apixiban is due  - continue home betablocker  - on multiple anti-anginals (amlo, carvedilol, isosorbide mononitrate, ranexa), Continue during admission  - TTE  - Cardiology consult to discuss possible angiogram

## 2024-06-25 NOTE — H&P
[facility] Emergency [facility] Medicine  History & Physical    Patient Name: Domingo Gross  MRN: [id number]  Patient Class: OP- Observation  Admission Date: 6/25/2024  Attending Physician: Jose Xie MD   Primary Care Provider: Perez Bell DO         Patient information was obtained from ER records and discussion with patient    Subjective:     Principal Problem: NSTEMI    Chief Complaint:   Chief Complaint   Patient presents with    Chest Pain     HX of being coded in the past, and STEMI.  Mid sternal, pressure type pain for 3 hours, increasing in intensity over time.        HPI: Domingo Gross is a 64 y.o. year old male with a history of CAD s/p PCI, PVD, ESRD on dialysis, hypertension, who presents with a chief complaint of chest pain. 3 hours prior to arrival to the ED he developed 4/10, non-radiating substernal chest pain that he described as an elephant sitting on his chest. He reports having multiple similar episodes (although less severe) during the week. He follows with Dr. Salazar with cardiology. Last stress test was a SPECT 3/2024 and was negative. Currently chest pain free since receiving nitroglycerin in the ED.     Past Medical History:   Diagnosis Date    Allergy     Anemia     Anticoagulant long-term use     Arthritis     CKD (chronic kidney disease) stage 4, GFR 15-29 ml/min     COPD (chronic obstructive pulmonary disease) 08/20/2021    Coronary artery disease     Heart attack 10/04/2019    Hematuria     Hemothorax     Hyperlipidemia     Hypertension     Hyperuricemia     Hypocalcemia     Hypokalemia     Hypophosphatemia     Hypothyroidism 3/2/2023    PAD (peripheral artery disease)     Proteinuria     Vitamin D deficiency        Past Surgical History:   Procedure Laterality Date    ABDOMINAL AORTOGRAPHY N/A 5/10/2019    Procedure: AORTOGRAM-ABDOMINAL;  Surgeon: Keo Jenkins MD;  Location: [facility] CATH LAB/EP;  Service: Cardiology;  Laterality: N/A;  RSFA intervention      AORTOGRAPHY WITH SERIALOGRAPHY N/A 12/3/2021    Procedure: AORTOGRAM, WITH SERIALOGRAPHY;  Surgeon: Keo Jenkins MD;  Location: [facility] CATH LAB/EP;  Service: Cardiology;  Laterality: N/A;    AORTOGRAPHY WITH SERIALOGRAPHY N/A 4/1/2022    Procedure: AORTOGRAM, WITH SERIALOGRAPHY;  Surgeon: Keo Jenkins MD;  Location: [facility] CATH LAB/EP;  Service: Cardiology;  Laterality: N/A;    ATHERECTOMY, CORONARY N/A 4/25/2023    Procedure: Atherectomy-coronary;  Surgeon: Keo Jenkins MD;  Location: [facility] CATH LAB/EP;  Service: Cardiology;  Laterality: N/A;    BONE MARROW BIOPSY Right 10/12/2021    Procedure: BIOPSY-BONE MARROW;  Surgeon: Naseem Woods MD;  Location: [facility] OR;  Service: Oncology;  Laterality: Right;    CARDIAC CATHETERIZATION      CATARACT EXTRACTION      CATARACT EXTRACTION W/  INTRAOCULAR LENS IMPLANT Right 6/8/2020    Procedure: EXTRACTION, CATARACT, WITH IOL INSERTION;  Surgeon: Tin Light MD;  Location: [facility] OR;  Service: Ophthalmology;  Laterality: Right;    CATARACT EXTRACTION W/  INTRAOCULAR LENS IMPLANT Left 7/2/2020    Procedure: EXTRACTION, CATARACT, WITH IOL INSERTION;  Surgeon: Tin Light MD;  Location: [facility] OR;  Service: Ophthalmology;  Laterality: Left;    COLONOSCOPY N/A 1/6/2022    Procedure: COLONOSCOPY;  Surgeon: Kathe Penaloza MD;  Location: [address]);  Service: Endoscopy;  Laterality: N/A;  COVID test at [facility] on 1/3-GT  okay to hold Plavix for 5 days and aspirin per Dr. Becerra  2nd floor due toextensive cardiac history   instructions mailed and [facility] -     CORONARY ANGIOGRAPHY N/A 3/29/2019    Procedure: ANGIOGRAM, CORONARY ARTERY;  Surgeon: Keo Jenkins MD;  Location: [facility] CATH LAB/EP;  Service: Cardiology;  Laterality: N/A;    CORONARY ANGIOGRAPHY Left 10/11/2019    Procedure: ANGIOGRAM, CORONARY ARTERY;  Surgeon: Keo Jenkins MD;  Location: [facility] CATH LAB/EP;  Service: Cardiology;  Laterality: Left;    CORONARY ANGIOGRAPHY N/A 4/10/2023     Procedure: ANGIOGRAM, CORONARY ARTERY;  Surgeon: Keo Jenkins MD;  Location: [facility] CATH LAB/EP;  Service: Cardiology;  Laterality: N/A;    CORONARY ANGIOPLASTY WITH STENT PLACEMENT  03/29/2019    mid and distal RCA    ESOPHAGOGASTRODUODENOSCOPY N/A 1/6/2022    Procedure: EGD (ESOPHAGOGASTRODUODENOSCOPY);  Surgeon: Kathe Penaloza MD;  Location: [facility] ENDO ([address] [place] [address]);  Service: Endoscopy;  Laterality: N/A;    EYE SURGERY      INSERTION OF TUNNELED CENTRAL VENOUS HEMODIALYSIS CATHETER N/A 2/9/2024    Procedure: INSERTION, CATHETER, HEMODIALYSIS, DUAL LUMEN;  Surgeon: Wang Mendieta MD;  Location: [facility] OR;  Service: General;  Laterality: N/A;    INSTANTANEOUS WAVE-FREE RATIO (IFR) N/A 4/25/2023    Procedure: Instantaneous Wave-Free Ratio (IFR);  Surgeon: Keo Jenkins MD;  Location: [facility] CATH LAB/EP;  Service: Cardiology;  Laterality: N/A;    INTRAVASCULAR ULTRASOUND, NON-CORONARY  8/12/2022    Procedure: Intravascular Ultrasound, Non-Coronary;  Surgeon: Keo Jenkins MD;  Location: [facility] CATH LAB/EP;  Service: Cardiology;;    IVUS, CORONARY  4/25/2023    Procedure: IVUS, Coronary;  Surgeon: Keo Jenkins MD;  Location: [facility] CATH LAB/EP;  Service: Cardiology;;    IVUS, CORONARY  5/16/2023    Procedure: IVUS, Coronary;  Surgeon: Keo Jenkins MD;  Location: [facility] CATH LAB/EP;  Service: Cardiology;;  CX    LEFT HEART CATHETERIZATION N/A 3/29/2019    Procedure: Left heart cath;  Surgeon: Keo Jenkins MD;  Location: [facility] CATH LAB/EP;  Service: Cardiology;  Laterality: N/A;    LEFT HEART CATHETERIZATION Left 10/8/2019    Procedure: Left heart cath;  Surgeon: Keo Jenkins MD;  Location: [facility] CATH LAB/EP;  Service: Cardiology;  Laterality: Left;    LEFT HEART CATHETERIZATION Left 4/10/2023    Procedure: Left heart cath;  Surgeon: Keo Jenkins MD;  Location: [facility] CATH LAB/EP;  Service: Cardiology;  Laterality: Left;    LEFT HEART CATHETERIZATION Left 4/25/2023    Procedure: Left heart cath;  Surgeon: Keo KYLE  MD Gregory;  Location: [facility] CATH LAB/EP;  Service: Cardiology;  Laterality: Left;    LEFT HEART CATHETERIZATION Left 5/16/2023    Procedure: Left heart cath;  Surgeon: Keo Jenkins MD;  Location: [facility] CATH LAB/EP;  Service: Cardiology;  Laterality: Left;    PERCUTANEOUS TRANSLUMINAL ANGIOPLASTY (PTA) OF PERIPHERAL VESSEL N/A 7/12/2019    Procedure: PTA, PERIPHERAL VESSEL;  Surgeon: Keo Jenkins MD;  Location: [facility] CATH LAB/EP;  Service: Cardiology;  Laterality: N/A;    PERCUTANEOUS TRANSLUMINAL ANGIOPLASTY (PTA) OF PERIPHERAL VESSEL Left 5/20/2022    Procedure: PTA, PERIPHERAL VESSEL;  Surgeon: Keo Jenkins MD;  Location: [facility] CATH LAB/EP;  Service: Cardiology;  Laterality: Left;    PERCUTANEOUS TRANSLUMINAL ANGIOPLASTY (PTA) OF PERIPHERAL VESSEL Right 8/12/2022    Procedure: PTA, PERIPHERAL VESSEL;  Surgeon: Keo Jenkins MD;  Location: [facility] CATH LAB/EP;  Service: Cardiology;  Laterality: Right;    PERCUTANEOUS TRANSLUMINAL BALLOON ANGIOPLASTY OF CORONARY ARTERY  4/25/2023    Procedure: Angioplasty-coronary;  Surgeon: Keo Jenkins MD;  Location: [facility] CATH LAB/EP;  Service: Cardiology;;    PLACEMENT OF ARTERIOVENOUS GRAFT Left 4/15/2024    Procedure: INSERTION, GRAFT, ARTERIOVENOUS;  Surgeon: Scott Pascual MD;  Location: [facility] OR;  Service: General;  Laterality: Left;    PTCA, SINGLE VESSEL  5/16/2023    Procedure: PTCA, Single Vessel;  Surgeon: Keo Jenkins MD;  Location: [facility] CATH LAB/EP;  Service: Cardiology;;  CX    REMOVAL OF HEMODIALYSIS CATHETER Right 2/9/2024    Procedure: REMOVAL, CATHETER, HEMODIALYSIS;  Surgeon: Wang Mendieta MD;  Location: [facility] OR;  Service: General;  Laterality: Right;    REMOVAL OF TUNNELED CENTRAL VENOUS CATHETER (CVC) Right 5/20/2024    Procedure: REMOVAL, CATHETER, CENTRAL VENOUS, TUNNELED;  Surgeon: Scott Pascual MD;  Location: [facility] OR;  Service: General;  Laterality: Right;    STENT, DRUG ELUTING, SINGLE VESSEL, CORONARY  4/25/2023     Procedure: Stent, Drug Eluting, Single Vessel, Coronary;  Surgeon: Keo Jenkins MD;  Location: [facility] CATH LAB/EP;  Service: Cardiology;;    STENT, DRUG ELUTING, SINGLE VESSEL, CORONARY  5/16/2023    Procedure: Stent, Drug Eluting, Single Vessel, Coronary;  Surgeon: Keo Jenkins MD;  Location: [facility] CATH LAB/EP;  Service: Cardiology;;  CX    TRANSESOPHAGEAL ECHOCARDIOGRAM WITH POSSIBLE CARDIOVERSION (JOSE W/ POSS CARDIOVERSION) N/A 6/19/2023    Procedure: Transesophageal echo (JOSE) intra-procedure log documentation;  Surgeon: Keo Jenkins MD;  Location: [facility] CATH LAB/EP;  Service: Cardiology;  Laterality: N/A;       Review of patient's allergies indicates:   Allergen Reactions    Ace inhibitors Rash       Current Facility-Administered Medications on File Prior to Encounter   Medication    sodium chloride 0.9% infusion     Current Outpatient Medications on File Prior to Encounter   Medication Sig    albuterol (PROVENTIL/VENTOLIN HFA) 90 mcg/actuation inhaler INHALE 2 PUFFS INTO THE LUNGS EVERY 6 HOURS AS NEEDED FOR WHEEZING    albuterol-ipratropium (DUO-NEB) 2.5 mg-0.5 mg/3 mL nebulizer solution Take 3 mLs by nebulization every 6 (six) hours as needed for Wheezing or Shortness of Breath (or cough). Rescue    amLODIPine (NORVASC) 5 MG tablet Take 1 tablet (5 mg total) by mouth once daily.    apixaban (ELIQUIS) 5 mg Tab Take 1 tablet (5 mg total) by mouth 2 (two) times daily.    carvediloL (COREG) 25 MG tablet Take 1 tablet (25 mg total) by mouth 2 (two) times daily with meals.    clopidogreL (PLAVIX) 75 mg tablet Take 1 tablet (75 mg total) by mouth once daily.    coenzyme Q10 100 mg capsule Take 100 mg by mouth once daily.    eplerenone (INSPRA) 50 MG Tab Take 1 tablet (50 mg total) by mouth once daily.    epoetin robi-epbx (RETACRIT) 40,000 unit/mL injection Inject 0.1 mLs (4,000 Units total) into the skin every 30 days.    hydrALAZINE (APRESOLINE) 50 MG tablet Take 1 tablet (50 mg total) by mouth every 8  (eight) hours.    HYDROcodone-acetaminophen (NORCO) 5-325 mg per tablet Take 1 tablet by mouth every 6 (six) hours as needed for Pain.    HYDROcodone-acetaminophen (NORCO) 5-325 mg per tablet Take 1 tablet by mouth every 6 (six) hours as needed for Pain.    hydrOXYzine pamoate (VISTARIL) 25 MG Cap TAKE 1 CAPSULE(25 MG) BY MOUTH EVERY NIGHT AS NEEDED FOR INSOMNIA OR ANXIETY    isosorbide mononitrate (IMDUR) 30 MG 24 hr tablet Take 1 tablet (30 mg total) by mouth once daily.    levothyroxine (SYNTHROID) 50 MCG tablet Take 50 mcg by mouth every morning.    levothyroxine (SYNTHROID) 75 MCG tablet Take 1 tablet (75 mcg total) by mouth before breakfast.    LIDOcaine (LIDODERM) 5 % Place 1 patch onto the skin daily as needed (back pain). Remove & Discard patch within 12 hours or as directed by MD    nitroGLYCERIN (NITROSTAT) 0.4 MG SL tablet Place 1 tablet (0.4 mg total) under the tongue every 5 (five) minutes as needed for Chest pain.    ranolazine (RANEXA) 500 MG Tb12 Take 1 tablet (500 mg total) by mouth 2 (two) times daily.    rosuvastatin (CRESTOR) 40 MG Tab Take 1 tablet (40 mg total) by mouth every evening.    sodium bicarbonate 650 MG tablet Take 1 tablet (650 mg total) by mouth once daily. for 30 doses     Family History       Problem Relation (Age of Onset)    Aneurysm Mother    Cancer Father    Diabetes Sister    Heart disease Mother    Hypertension Mother, Sister    No Known Problems Brother, Son          Tobacco Use    Smoking status: Former     Current packs/day: 0.00     Types: Cigarettes     Quit date: 1999     Years since quittin.1    Smokeless tobacco: Never   Substance and Sexual Activity    Alcohol use: No     Alcohol/week: 0.0 standard drinks of alcohol    Drug use: No    Sexual activity: Yes     Partners: Female     Review of Systems   Constitutional:  Negative for activity change, appetite change and chills.   HENT: Negative.  Negative for congestion.    Eyes:  Negative for photophobia and  visual disturbance.   Respiratory:  Positive for chest tightness. Negative for apnea and shortness of breath.    Cardiovascular:  Positive for chest pain. Negative for palpitations and leg swelling.   Gastrointestinal:  Negative for abdominal distention, abdominal pain and diarrhea.   Endocrine: Negative for cold intolerance and heat intolerance.   Musculoskeletal:  Negative for joint swelling.   Skin:  Negative for color change and pallor.   Neurological:  Negative for dizziness and headaches.   Psychiatric/Behavioral:  Negative for agitation and behavioral problems.      Objective:     Vital Signs (Most Recent):  Temp: 98.3 °F (36.8 °C) (06/25/24 0027)  Pulse: 65 (06/25/24 0117)  Resp: 20 (06/25/24 0111)  BP: 135/65 (06/25/24 0117)  SpO2: 98 % (06/25/24 0117) Vital Signs (24h Range):  Temp:  [98.3 °F (36.8 °C)] 98.3 °F (36.8 °C)  Pulse:  [64-78] 65  Resp:  [18-25] 20  SpO2:  [96 %-98 %] 98 %  BP: (120-146)/(58-67) 135/65     Weight: 81.6 kg (180 lb)  Body mass index is 25.83 kg/m².     Physical Exam  Constitutional:       Appearance: Normal appearance.   HENT:      Head: Normocephalic and atraumatic.   Cardiovascular:      Rate and Rhythm: Normal rate and regular rhythm.   Pulmonary:      Effort: No respiratory distress.      Breath sounds: No stridor.   Abdominal:      General: Bowel sounds are normal. There is no distension.   Musculoskeletal:         General: No swelling or deformity.      Right lower leg: No edema.      Left lower leg: No edema.   Skin:     General: Skin is warm and dry.   Neurological:      General: No focal deficit present.      Mental Status: He is alert and oriented to person, place, and time. Mental status is at baseline.      Cranial Nerves: No cranial nerve deficit.   Psychiatric:         Mood and Affect: Mood normal.         Behavior: Behavior normal.                Significant Labs: All pertinent labs within the past 24 hours have been reviewed.    Significant Imaging: I have reviewed  all pertinent imaging results/findings within the past 24 hours.  Assessment/Plan:     NSTEMI (non-ST elevated myocardial infarction)  - known hx of CAD presents with chest pain, elevated troponin  - EKG with LBBB, TWI V5-V6 (LBBB old, twi intermittently present on review of past EKG)  - aspirin loaded  - continue aspirin, plavix  - fully anticoagulated with apixiban. Will hold apixiban, transition to heparin gtt when next dose of apixiban is due  - continue home betablocker  - on multiple anti-anginals (amlo, carvedilol, isosorbide mononitrate, ranexa), Continue during admission  - TTE  - Cardiology consult to discuss possible angiogram       ESRD (end stage renal disease) on dialysis  - appears euvolemic  - renal consult placed for inaptient dialysis management.    Hypothyroidism  Continue home levothyroxine      COPD (chronic obstructive pulmonary disease)  - No wheezing  - appears at baseline    Coronary artery disease involving native coronary artery of native heart with angina pectoris with documented spasm  - known CAD with prior stents (mid LAD/prox RCA PCI 3/2019, prox LCA in 10/2019)  - management as above for NSTEMI    Hyperlipidemia  - continue statin  - rosuvastatin changed to formulary alternative (atorvastatin).       HTN (hypertension)  - stable, continue home medications      Paroxysmal atrial fibrillation   - currently in sinus  - hold apixiban  - rate control: carvedilol     VTE Risk Mitigation (From admission, onward)           Ordered     IP VTE HIGH RISK PATIENT  Once         06/25/24 0233     Place sequential compression device  Until discontinued         06/25/24 0233                       On 06/25/2024, patient should be placed in hospital observation services under my care.             Nehemais Caldera MD  Department of Hospital Medicine  [place] - Emergency Dept

## 2024-06-25 NOTE — PHARMACY MED REC
"Admission Medication History     The home medication history was taken by Zainab Hewitt CPhT.    Medication history obtained from, Patient Verified     You may go to "Admission" then "Reconcile Home Medications" tabs to review and/or act upon these items.     The home medication list has been updated by the Pharmacy department.   Please read ALL comments highlighted in yellow.   Please address this information as you see fit.    Feel free to contact us if you have any questions or require assistance.      The medications listed below were removed from the home medication list.  Please reorder if appropriate:  Patient reports no longer taking the following medication(s):  Retacrit 40,000 unit/ml        Zainab Hewitt CPhT.  Ext [phone]               .          "

## 2024-06-25 NOTE — LETTER
June 27, 2024         [address] [place] [address] [place] [id number]  Phone: [phone]  Fax: [phone]       Patient: Domingo Gross   YOB: 1960  Date of Visit: 06/27/2024    To Whom It May Concern:    Martha Gross  was at [facility] on 06/27/2024. The patient may return to work on Monday, July 1, 2024 with no heavy lifting. If you have any questions or concerns, or if I can be of further assistance, please do not hesitate to contact me.    Sincerely,    Yolanda Zambrano MD

## 2024-06-25 NOTE — HPI
Dmoingo Gross is a 64 y.o. year old male with AF (on Eliquis), COPD, CAD s/p PCI, PVD, ESRD on dialysis, hypertension. Patient presented to the ED with CP x 3 hours described as an elephant on his chest. Pain was similar to prior MIs. Patient reports less severe CP over the past week. Patient denies diaphoresis, palpitations, SOB above baseline, edema. EKG SR LBBB. Currently CP free after administration of NTG.  Troponin elevated 0.6-0.7. TTE pending. Last dose of Eliquis was yesterday. He is on DAPT, statin, BB, and heparin gtt. NPO p [place] for LHC on 06/26/2024.

## 2024-06-25 NOTE — ED PROVIDER NOTES
ED Provider Note - 6/25/2024    History     Chief Complaint   Patient presents with    Chest Pain     HX of being coded in the past, and STEMI.  Mid sternal, pressure type pain for 3 hours, increasing in intensity over time.       HPI     Domingo Gross is a 64 y.o. year old male with past medical and surgical history as seen below, presenting with chief complaint of chest pain.  Substernal.  Started 3 hours ago.  Given aspirin 325 and 2 sprays sublingual nitro prior to arrival.  Pain improved with nitroglycerin.  Nonradiating.  Has been having transient episodes of chest pain more frequently the past days to weeks.  Followed by Dr. Salazar.  Per records, has plans for outpatient stress and echo.        Past Medical History:   Diagnosis Date    Allergy     Anemia     Anticoagulant long-term use     Arthritis     CKD (chronic kidney disease) stage 4, GFR 15-29 ml/min     COPD (chronic obstructive pulmonary disease) 08/20/2021    Coronary artery disease     Heart attack 10/04/2019    Hematuria     Hemothorax     Hyperlipidemia     Hypertension     Hyperuricemia     Hypocalcemia     Hypokalemia     Hypophosphatemia     Hypothyroidism 3/2/2023    PAD (peripheral artery disease)     Proteinuria     Vitamin D deficiency      Past Surgical History:   Procedure Laterality Date    ABDOMINAL AORTOGRAPHY N/A 5/10/2019    Procedure: AORTOGRAM-ABDOMINAL;  Surgeon: Keo Jenkins MD;  Location: [facility] CATH LAB/EP;  Service: Cardiology;  Laterality: N/A;  RSFA intervention     AORTOGRAPHY WITH SERIALOGRAPHY N/A 12/3/2021    Procedure: AORTOGRAM, WITH SERIALOGRAPHY;  Surgeon: Keo Jenkins MD;  Location: [facility] CATH LAB/EP;  Service: Cardiology;  Laterality: N/A;    AORTOGRAPHY WITH SERIALOGRAPHY N/A 4/1/2022    Procedure: AORTOGRAM, WITH SERIALOGRAPHY;  Surgeon: Keo Jenkins MD;  Location: [facility] CATH LAB/EP;  Service: Cardiology;  Laterality: N/A;    ATHERECTOMY, CORONARY N/A 4/25/2023    Procedure: Atherectomy-coronary;   Surgeon: Keo Jenkins MD;  Location: [facility] CATH LAB/EP;  Service: Cardiology;  Laterality: N/A;    BONE MARROW BIOPSY Right 10/12/2021    Procedure: BIOPSY-BONE MARROW;  Surgeon: Naseem Woods MD;  Location: [facility] OR;  Service: Oncology;  Laterality: Right;    CARDIAC CATHETERIZATION      CATARACT EXTRACTION      CATARACT EXTRACTION W/  INTRAOCULAR LENS IMPLANT Right 6/8/2020    Procedure: EXTRACTION, CATARACT, WITH IOL INSERTION;  Surgeon: Tin Light MD;  Location: [facility] [place] OR;  Service: Ophthalmology;  Laterality: Right;    CATARACT EXTRACTION W/  INTRAOCULAR LENS IMPLANT Left 7/2/2020    Procedure: EXTRACTION, CATARACT, WITH IOL INSERTION;  Surgeon: Tin Light MD;  Location: [facility] [place] OR;  Service: Ophthalmology;  Laterality: Left;    COLONOSCOPY N/A 1/6/2022    Procedure: COLONOSCOPY;  Surgeon: Kathe Penaloza MD;  Location: [facility] (2ND FLR);  Service: Endoscopy;  Laterality: N/A;  COVID test at [facility] on 1/3-GT  okay to hold Plavix for 5 days and aspirin per Dr. Becerra  2nd floor due toextensive cardiac history   instructions mailed and [facility] -     CORONARY ANGIOGRAPHY N/A 3/29/2019    Procedure: ANGIOGRAM, CORONARY ARTERY;  Surgeon: Keo Jenkins MD;  Location: [facility] CATH LAB/EP;  Service: Cardiology;  Laterality: N/A;    CORONARY ANGIOGRAPHY Left 10/11/2019    Procedure: ANGIOGRAM, CORONARY ARTERY;  Surgeon: Keo Jenkins MD;  Location: [facility] CATH LAB/EP;  Service: Cardiology;  Laterality: Left;    CORONARY ANGIOGRAPHY N/A 4/10/2023    Procedure: ANGIOGRAM, CORONARY ARTERY;  Surgeon: Keo Jenkins MD;  Location: [facility] CATH LAB/EP;  Service: Cardiology;  Laterality: N/A;    CORONARY ANGIOPLASTY WITH STENT PLACEMENT  03/29/2019    mid and distal RCA    ESOPHAGOGASTRODUODENOSCOPY N/A 1/6/2022    Procedure: EGD (ESOPHAGOGASTRODUODENOSCOPY);  Surgeon: Kathe Penaloza MD;  Location: [facility] ENDO (2ND FLR);  Service: Endoscopy;  Laterality: N/A;    EYE SURGERY      INSERTION OF TUNNELED CENTRAL  VENOUS HEMODIALYSIS CATHETER N/A 2/9/2024    Procedure: INSERTION, CATHETER, HEMODIALYSIS, DUAL LUMEN;  Surgeon: Wang Mendieta MD;  Location: [facility] OR;  Service: General;  Laterality: N/A;    INSTANTANEOUS WAVE-FREE RATIO (IFR) N/A 4/25/2023    Procedure: Instantaneous Wave-Free Ratio (IFR);  Surgeon: Keo Jenkins MD;  Location: [facility] CATH LAB/EP;  Service: Cardiology;  Laterality: N/A;    INTRAVASCULAR ULTRASOUND, NON-CORONARY  8/12/2022    Procedure: Intravascular Ultrasound, Non-Coronary;  Surgeon: Keo Jenkins MD;  Location: [facility] CATH LAB/EP;  Service: Cardiology;;    IVUS, CORONARY  4/25/2023    Procedure: IVUS, Coronary;  Surgeon: Keo Jenkins MD;  Location: [facility] CATH LAB/EP;  Service: Cardiology;;    IVUS, CORONARY  5/16/2023    Procedure: IVUS, Coronary;  Surgeon: Keo Jenkins MD;  Location: [facility] CATH LAB/EP;  Service: Cardiology;;  CX    LEFT HEART CATHETERIZATION N/A 3/29/2019    Procedure: Left heart cath;  Surgeon: Keo Jenkins MD;  Location: [facility] CATH LAB/EP;  Service: Cardiology;  Laterality: N/A;    LEFT HEART CATHETERIZATION Left 10/8/2019    Procedure: Left heart cath;  Surgeon: Keo Jenkins MD;  Location: [facility] CATH LAB/EP;  Service: Cardiology;  Laterality: Left;    LEFT HEART CATHETERIZATION Left 4/10/2023    Procedure: Left heart cath;  Surgeon: Keo Jenkins MD;  Location: [facility] CATH LAB/EP;  Service: Cardiology;  Laterality: Left;    LEFT HEART CATHETERIZATION Left 4/25/2023    Procedure: Left heart cath;  Surgeon: Keo Jenkins MD;  Location: [facility] CATH LAB/EP;  Service: Cardiology;  Laterality: Left;    LEFT HEART CATHETERIZATION Left 5/16/2023    Procedure: Left heart cath;  Surgeon: Keo Jenkins MD;  Location: [facility] CATH LAB/EP;  Service: Cardiology;  Laterality: Left;    PERCUTANEOUS TRANSLUMINAL ANGIOPLASTY (PTA) OF PERIPHERAL VESSEL N/A 7/12/2019    Procedure: PTA, PERIPHERAL VESSEL;  Surgeon: Keo Jenkins MD;  Location: [facility] CATH LAB/EP;  Service: Cardiology;   Laterality: N/A;    PERCUTANEOUS TRANSLUMINAL ANGIOPLASTY (PTA) OF PERIPHERAL VESSEL Left 5/20/2022    Procedure: PTA, PERIPHERAL VESSEL;  Surgeon: Keo Jenkins MD;  Location: [facility] CATH LAB/EP;  Service: Cardiology;  Laterality: Left;    PERCUTANEOUS TRANSLUMINAL ANGIOPLASTY (PTA) OF PERIPHERAL VESSEL Right 8/12/2022    Procedure: PTA, PERIPHERAL VESSEL;  Surgeon: Keo Jenkins MD;  Location: [facility] CATH LAB/EP;  Service: Cardiology;  Laterality: Right;    PERCUTANEOUS TRANSLUMINAL BALLOON ANGIOPLASTY OF CORONARY ARTERY  4/25/2023    Procedure: Angioplasty-coronary;  Surgeon: Keo Jenkins MD;  Location: [facility] CATH LAB/EP;  Service: Cardiology;;    PLACEMENT OF ARTERIOVENOUS GRAFT Left 4/15/2024    Procedure: INSERTION, GRAFT, ARTERIOVENOUS;  Surgeon: Scott Pascual MD;  Location: [facility] OR;  Service: General;  Laterality: Left;    PTCA, SINGLE VESSEL  5/16/2023    Procedure: PTCA, Single Vessel;  Surgeon: Keo Jenkins MD;  Location: [facility] CATH LAB/EP;  Service: Cardiology;;  CX    REMOVAL OF HEMODIALYSIS CATHETER Right 2/9/2024    Procedure: REMOVAL, CATHETER, HEMODIALYSIS;  Surgeon: Wang Mendieta MD;  Location: [facility] OR;  Service: General;  Laterality: Right;    REMOVAL OF TUNNELED CENTRAL VENOUS CATHETER (CVC) Right 5/20/2024    Procedure: REMOVAL, CATHETER, CENTRAL VENOUS, TUNNELED;  Surgeon: Scott Pascual MD;  Location: [facility] OR;  Service: General;  Laterality: Right;    STENT, DRUG ELUTING, SINGLE VESSEL, CORONARY  4/25/2023    Procedure: Stent, Drug Eluting, Single Vessel, Coronary;  Surgeon: Keo Jenkins MD;  Location: [facility] CATH LAB/EP;  Service: Cardiology;;    STENT, DRUG ELUTING, SINGLE VESSEL, CORONARY  5/16/2023    Procedure: Stent, Drug Eluting, Single Vessel, Coronary;  Surgeon: Keo Jenkins MD;  Location: [facility] CATH LAB/EP;  Service: Cardiology;;  CX    TRANSESOPHAGEAL ECHOCARDIOGRAM WITH POSSIBLE CARDIOVERSION (JOSE W/ POSS CARDIOVERSION) N/A 6/19/2023    Procedure:  "Transesophageal echo (JOSE) intra-procedure log documentation;  Surgeon: Keo Jenkins MD;  Location: [facility] CATH LAB/EP;  Service: Cardiology;  Laterality: N/A;         Family History   Problem Relation Name Age of Onset    Aneurysm Mother      Hypertension Mother      Heart disease Mother      Cancer Father      Diabetes Sister 1     Hypertension Sister 1     No Known Problems Brother 1     No Known Problems Son 1      Social History     Tobacco Use    Smoking status: Former     Current packs/day: 0.00     Types: Cigarettes     Quit date: 1999     Years since quittin.2    Smokeless tobacco: Never   Substance Use Topics    Alcohol use: No     Alcohol/week: 0.0 standard drinks of alcohol    Drug use: No     Social Determinants of Health with Concerns     Tobacco Use: Medium Risk (2024)    Patient History     Smoking Tobacco Use: Former     Smokeless Tobacco Use: Never     Passive Exposure: Not on file   Physical Activity: Inactive (2024)    Exercise Vital Sign     Days of Exercise per Week: 0 days     Minutes of Exercise per Session: 0 min      Review of patient's allergies indicates:   Allergen Reactions    Ace inhibitors Rash       Review of Systems     A full Review of Systems (ROS) was performed and was negative unless otherwise stated in the HPI.      Physical Exam     Vitals:    24 1941 24 0002 24 0021 24 0100   BP: 126/60 128/63     BP Location: Right arm Right arm     Patient Position: Lying Lying     Pulse: 64 75 77    Resp: 16 20     Temp: 97.6 °F (36.4 °C) 98.4 °F (36.9 °C)     TempSrc: Oral Oral     SpO2: (!) 93% (!) 89%  (!) 93%   Weight: 83.7 kg (184 lb 8.4 oz)      Height: 5' 10" (1.778 m)           Physical Exam    Nursing note and vitals reviewed.  Constitutional: He appears well-developed and well-nourished. No distress.   HENT:   Head: Normocephalic and atraumatic.   Right Ear: External ear normal.   Left Ear: External ear normal.   Nose: Nose normal. "   Mouth/Throat: Oropharynx is clear and moist.   Eyes: Conjunctivae and EOM are normal. Pupils are equal, round, and reactive to light.   Neck: Neck supple.   Normal range of motion.  Cardiovascular:  Normal rate and regular rhythm.           No murmur heard.  Pulmonary/Chest: Breath sounds normal. No stridor. No respiratory distress. He has no wheezes. He has no rhonchi. He has no rales.   Abdominal: Abdomen is soft. Bowel sounds are normal. There is no abdominal tenderness.   Musculoskeletal:         General: No edema. Normal range of motion.      Cervical back: Normal range of motion and neck supple.     Neurological: He is alert and oriented to person, place, and time. He has normal strength. No cranial nerve deficit or sensory deficit.   Skin: Skin is warm and dry. No rash noted.   Psychiatric: He has a normal mood and affect. Thought content normal.         Lab Results- Independently reviewed by myself      Labs Reviewed   CBC W/ AUTO DIFFERENTIAL - Abnormal; Notable for the following components:       Result Value    RBC 3.01 (*)     Hemoglobin 8.5 (*)     Hematocrit 25.7 (*)     RDW 15.0 (*)     All other components within normal limits   COMPREHENSIVE METABOLIC PANEL - Abnormal; Notable for the following components:    Potassium 3.0 (*)     CO2 22 (*)     BUN 35 (*)     Creatinine 4.6 (*)     Albumin 3.1 (*)     ALT 8 (*)     eGFR 14 (*)     All other components within normal limits   TROPONIN I - Abnormal; Notable for the following components:    Troponin I 0.680 (*)     All other components within normal limits   B-TYPE NATRIURETIC PEPTIDE - Abnormal; Notable for the following components:     (*)     All other components within normal limits   TROPONIN I - Abnormal; Notable for the following components:    Troponin I 0.708 (*)     All other components within normal limits   PROTIME-INR - Abnormal; Notable for the following components:    Prothrombin Time 13.5 (*)     INR 1.3 (*)     All other  components within normal limits   APTT - Abnormal; Notable for the following components:    aPTT 37.3 (*)     All other components within normal limits   LIPID PANEL - Abnormal; Notable for the following components:    LDL Cholesterol 55.2 (*)     All other components within normal limits   APTT - Abnormal; Notable for the following components:    aPTT 37.4 (*)     All other components within normal limits    Narrative:     Draw baseline aPTT prior to starting the heparin bolus or  infusion  (if patient is on warfarin prior to heparin therapy)   PROTIME-INR - Abnormal; Notable for the following components:    Prothrombin Time 14.1 (*)     INR 1.3 (*)     All other components within normal limits    Narrative:     Draw baseline aPTT prior to starting the heparin bolus or  infusion  (if patient is on warfarin prior to heparin therapy)   CBC W/ AUTO DIFFERENTIAL - Abnormal; Notable for the following components:    RBC 2.91 (*)     Hemoglobin 8.1 (*)     Hematocrit 24.9 (*)     RDW 14.9 (*)     All other components within normal limits    Narrative:     Draw baseline aPTT prior to starting the heparin bolus or  infusion  (if patient is on warfarin prior to heparin therapy)   TROPONIN I - Abnormal; Notable for the following components:    Troponin I 0.755 (*)     All other components within normal limits   APTT   LIPID PANEL   PROTIME-INR   HEMOGLOBIN A1C   HEMOGLOBIN A1C   TYPE & SCREEN           Imaging     Imaging Results    None           EKG Readings: (Independently Interpreted)   Initial Reading: No STEMI. Rhythm: Normal Sinus Rhythm. Heart Rate: 74. Ectopy: No Ectopy. Conduction: LBBB. Axis: Left Axis Deviation.           ED Course         Critical Care    Date/Time: 6/25/2024 1:27 AM    Performed by: Avel Ocasio MD  Authorized by: Avel Ocasio MD  Direct patient critical care time: 13 minutes  Additional history critical care time: 7 minutes  Ordering / reviewing critical care time: 7  minutes  Documentation critical care time: 8 minutes  Consulting other physicians critical care time: 7 minutes  Total critical care time (exclusive of procedural time) : 42 minutes  Critical care time was exclusive of separately billable procedures and treating other patients and teaching time.  Critical care was necessary to treat or prevent imminent or life-threatening deterioration of the following conditions: NSTEMI.  Critical care was time spent personally by me on the following activities: blood draw for specimens, discussions with consultants, interpretation of cardiac output measurements, evaluation of patient's response to treatment, examination of patient, obtaining history from patient or surrogate, ordering and review of laboratory studies, ordering and performing treatments and interventions, ordering and review of radiographic studies, pulse oximetry, re-evaluation of patient's condition and review of old charts.               Orders Placed This Encounter    CBC auto differential    Comprehensive metabolic panel    Troponin I #1    BNP    Troponin I    APTT    Lipid panel    Protime-INR    Hemoglobin A1c    Protime-INR    APTT    Lipid Panel    Hemoglobin A1c    APTT    Protime-INR    CBC auto differential    CBC auto differential    Phosphorus    Iron and TIBC    PTH, intact    Microalbumin/creatinine urine ratio    Microalbumin/creatinine urine ratio    Urinalysis    APTT    Basic Metabolic Panel    Magnesium    Phosphorus    Albumin    Phosphorus    Troponin I    APTT    APTT    Diet Renal Fluid - 1500mL; Standard Tray    Vital signs    Notify Physician/Vital Signs Parameters Upon Admission, then Q 4 hours    Cardiac Monitoring - Adult    Tele: Maintain IV access while on telemetry - Adult    Daily weights Standing weight when possible. If not, use bed weight.    Strict intake and output    Place sequential compression device    Draw aPTT level 6 hours after start of infusion; adjust dosage and  order aPTT based on the nomogram in hyperlink    Do not administer any intramuscular injections, call physician for an alternative route or medication if medication is needed    If bleeding occurs, or HIT is suspected, stop heparin infusion and contact physician    Full code    Inpatient consult to Registered Dietitian/Nutritionist    Inpatient consult to Social Work/Case Management    Inpatient consult to Cardiology-[facility]    Inpatient consult to [facility]rology[facility] (Elmer Bernstein, Linnea)    Pulse Oximetry Continuous    EKG 12-lead    EKG 12-lead    EKG 12-lead    Echo    Type & Screen    Saline lock IV    Possible Hospitalization    Place in Observation    Admit to Inpatient    nitroGLYCERIN SL tablet 0.4 mg    albuterol-ipratropium 2.5 mg-0.5 mg/3 mL nebulizer solution 3 mL    amLODIPine tablet 5 mg    clopidogreL tablet 75 mg    spironolactone tablet 25 mg    hydrALAZINE tablet 50 mg    hydrOXYzine pamoate capsule 25 mg    isosorbide mononitrate 24 hr tablet 30 mg    levothyroxine tablet 75 mcg    LIDOcaine 5 % patch 1 patch    ranolazine 12 hr tablet 500 mg    atorvastatin tablet 80 mg    diphenhydrAMINE capsule 50 mg    aspirin chewable tablet 81 mg    carvediloL tablet 25 mg    heparin 25,000 units in dextrose 5% 250 mL (100 units/mL) infusion LOW INTENSITY nomogram - [facility]    heparin 25,000 units in dextrose 5% (100 units/ml) IV bolus from bag LOW INTENSITY nomogram - [facility]    heparin 25,000 units in dextrose 5% (100 units/ml) IV bolus from bag LOW INTENSITY nomogram - [facility]    potassium chloride SA CR tablet 40 mEq    furosemide injection 40 mg    IP VTE HIGH RISK PATIENT    Progressive Mobility Protocol (mobilize patient to their highest level of functioning at least twice daily)          ED Course as of 06/26/24 0127   Tue Jun 25, 2024   0111 CBC auto differential(!)  Stable and chronic anemia without leukocytosis. [KB]   0132 Comprehensive metabolic panel(!)  Consistent with prior values in this  ESRD patient [KB]   0151 D/w Dr. Montgomery, [facility], who will continue patient's evaluation and management. [KB]      ED Course User Index  [KB] Avel Ocasio MD              Medical Decision Making       The patient's list of active medical problems, social history, medications, and allergies as documented per RN staff has been reviewed.           Medical Decision Making  64-year-old male with known CAD s/p multiple stents presents with chest pain starting at rest.  Persistent.  Elevation of troponin from his baseline troponin leak.  Concern for NSTEMI.  Discussed with Hospital Medicine to continue patient's evaluation and management.    Amount and/or Complexity of Data Reviewed  External Data Reviewed: labs, ECG and notes.  Labs: ordered. Decision-making details documented in ED Course.  ECG/medicine tests: ordered and independent interpretation performed.    Risk  Prescription drug management.  Decision regarding hospitalization.         Additional MDM:   Differential Diagnosis:   Symptom: Chest pain. <> The follow diagnoses were considered and will be evaluated: Acute MI, Atypical Chest Pain, Chest Wall Pain, Costochondritis, Myocarditis, Pericarditis, Pleural Effusion, Pleurisy, Pneumonia, Pneumothorax, Pulmonary Embolism, ST Elevation MI and Unstable Angina.                    Clinical Impression       Disposition   ED Disposition Condition    Observation             Diagnosis    ICD-10-CM ICD-9-CM   1. Chest pain  R07.9 786.50   2. Non-ST elevation myocardial infarction (NSTEMI)  I21.4 410.70   3. NSTEMI (non-ST elevation myocardial infarction)  I21.4 410.70   4. NSTEMI (non-ST elevated myocardial infarction)  I21.4 410.70           Avel Ocasio MD        06/26/2024          DISCLAIMER: This note was prepared with [employer] voice recognition transcription software. Garbled syntax, mangled pronouns, and other bizarre constructions may be attributed to that software system.       Avel Ocasio MD  06/26/24  [id number]

## 2024-06-25 NOTE — ASSESSMENT & PLAN NOTE
Patient with extensive CAD history   Presented with typical cardiac chest pain   Troponin 0.6, 0.7- trend until it peaks  NPO p MN for LHC on 06/26/2024 (currently CP free. Last dose of Eliquis was yesterday)  Continue DAPT, statin, Heparin gtt, Imdur, Ranexa  TTE pending

## 2024-06-25 NOTE — SUBJECTIVE & OBJECTIVE
Past Medical History:   Diagnosis Date    Allergy     Anemia     Anticoagulant long-term use     Arthritis     CKD (chronic kidney disease) stage 4, GFR 15-29 ml/min     COPD (chronic obstructive pulmonary disease) 08/20/2021    Coronary artery disease     Heart attack 10/04/2019    Hematuria     Hemothorax     Hyperlipidemia     Hypertension     Hyperuricemia     Hypocalcemia     Hypokalemia     Hypophosphatemia     Hypothyroidism 3/2/2023    PAD (peripheral artery disease)     Proteinuria     Vitamin D deficiency        Past Surgical History:   Procedure Laterality Date    ABDOMINAL AORTOGRAPHY N/A 5/10/2019    Procedure: AORTOGRAM-ABDOMINAL;  Surgeon: Keo Jenkins MD;  Location: [facility] CATH LAB/EP;  Service: Cardiology;  Laterality: N/A;  RSFA intervention     AORTOGRAPHY WITH SERIALOGRAPHY N/A 12/3/2021    Procedure: AORTOGRAM, WITH SERIALOGRAPHY;  Surgeon: Keo Jenkins MD;  Location: [facility] CATH LAB/EP;  Service: Cardiology;  Laterality: N/A;    AORTOGRAPHY WITH SERIALOGRAPHY N/A 4/1/2022    Procedure: AORTOGRAM, WITH SERIALOGRAPHY;  Surgeon: Keo Jenkins MD;  Location: [facility] CATH LAB/EP;  Service: Cardiology;  Laterality: N/A;    ATHERECTOMY, CORONARY N/A 4/25/2023    Procedure: Atherectomy-coronary;  Surgeon: Keo Jenkins MD;  Location: [facility] CATH LAB/EP;  Service: Cardiology;  Laterality: N/A;    BONE MARROW BIOPSY Right 10/12/2021    Procedure: BIOPSY-BONE MARROW;  Surgeon: Naseem Woods MD;  Location: [facility] OR;  Service: Oncology;  Laterality: Right;    CARDIAC CATHETERIZATION      CATARACT EXTRACTION      CATARACT EXTRACTION W/  INTRAOCULAR LENS IMPLANT Right 6/8/2020    Procedure: EXTRACTION, CATARACT, WITH IOL INSERTION;  Surgeon: Tin Light MD;  Location: [facility] OR;  Service: Ophthalmology;  Laterality: Right;    CATARACT EXTRACTION W/  INTRAOCULAR LENS IMPLANT Left 7/2/2020    Procedure: EXTRACTION, CATARACT, WITH IOL INSERTION;  Surgeon: Tin Light MD;  Location: [facility] OR;  Service:  Ophthalmology;  Laterality: Left;    COLONOSCOPY N/A 1/6/2022    Procedure: COLONOSCOPY;  Surgeon: Kathe Penaloza MD;  Location: [facility] ENDO (2ND FLR);  Service: Endoscopy;  Laterality: N/A;  COVID test at [facility] on 1/3-GT  okay to hold Plavix for 5 days and aspirin per Dr. Becerra  2nd floor due toextensive cardiac history   instructions mailed and my [facility] portal -     CORONARY ANGIOGRAPHY N/A 3/29/2019    Procedure: ANGIOGRAM, CORONARY ARTERY;  Surgeon: Keo Jenkins MD;  Location: [facility] CATH LAB/EP;  Service: Cardiology;  Laterality: N/A;    CORONARY ANGIOGRAPHY Left 10/11/2019    Procedure: ANGIOGRAM, CORONARY ARTERY;  Surgeon: Keo Jenkins MD;  Location: [facility] CATH LAB/EP;  Service: Cardiology;  Laterality: Left;    CORONARY ANGIOGRAPHY N/A 4/10/2023    Procedure: ANGIOGRAM, CORONARY ARTERY;  Surgeon: Keo Jenkins MD;  Location: [facility] CATH LAB/EP;  Service: Cardiology;  Laterality: N/A;    CORONARY ANGIOPLASTY WITH STENT PLACEMENT  03/29/2019    mid and distal RCA    ESOPHAGOGASTRODUODENOSCOPY N/A 1/6/2022    Procedure: EGD (ESOPHAGOGASTRODUODENOSCOPY);  Surgeon: Kathe Penaloza MD;  Location: [facility] ENDO (2ND FLR);  Service: Endoscopy;  Laterality: N/A;    EYE SURGERY      INSERTION OF TUNNELED CENTRAL VENOUS HEMODIALYSIS CATHETER N/A 2/9/2024    Procedure: INSERTION, CATHETER, HEMODIALYSIS, DUAL LUMEN;  Surgeon: Wang Mendieta MD;  Location: [facility] OR;  Service: General;  Laterality: N/A;    INSTANTANEOUS WAVE-FREE RATIO (IFR) N/A 4/25/2023    Procedure: Instantaneous Wave-Free Ratio (IFR);  Surgeon: Keo Jenkins MD;  Location: [facility] CATH LAB/EP;  Service: Cardiology;  Laterality: N/A;    INTRAVASCULAR ULTRASOUND, NON-CORONARY  8/12/2022    Procedure: Intravascular Ultrasound, Non-Coronary;  Surgeon: Keo Jenkins MD;  Location: [facility] CATH LAB/EP;  Service: Cardiology;;    IVUS, CORONARY  4/25/2023    Procedure: IVUS, Coronary;  Surgeon: Keo Jenkins MD;  Location: [facility] CATH LAB/EP;  Service:  Cardiology;;    IVUS, CORONARY  5/16/2023    Procedure: IVUS, Coronary;  Surgeon: Keo Jenkins MD;  Location: [facility] CATH LAB/EP;  Service: Cardiology;;  CX    LEFT HEART CATHETERIZATION N/A 3/29/2019    Procedure: Left heart cath;  Surgeon: Keo Jenkins MD;  Location: [facility] CATH LAB/EP;  Service: Cardiology;  Laterality: N/A;    LEFT HEART CATHETERIZATION Left 10/8/2019    Procedure: Left heart cath;  Surgeon: Keo Jenkins MD;  Location: [facility] CATH LAB/EP;  Service: Cardiology;  Laterality: Left;    LEFT HEART CATHETERIZATION Left 4/10/2023    Procedure: Left heart cath;  Surgeon: Keo Jenkins MD;  Location: [facility] CATH LAB/EP;  Service: Cardiology;  Laterality: Left;    LEFT HEART CATHETERIZATION Left 4/25/2023    Procedure: Left heart cath;  Surgeon: Keo Jenkins MD;  Location: [facility] CATH LAB/EP;  Service: Cardiology;  Laterality: Left;    LEFT HEART CATHETERIZATION Left 5/16/2023    Procedure: Left heart cath;  Surgeon: Keo Jenkins MD;  Location: [facility] CATH LAB/EP;  Service: Cardiology;  Laterality: Left;    PERCUTANEOUS TRANSLUMINAL ANGIOPLASTY (PTA) OF PERIPHERAL VESSEL N/A 7/12/2019    Procedure: PTA, PERIPHERAL VESSEL;  Surgeon: Keo Jenkins MD;  Location: [facility] CATH LAB/EP;  Service: Cardiology;  Laterality: N/A;    PERCUTANEOUS TRANSLUMINAL ANGIOPLASTY (PTA) OF PERIPHERAL VESSEL Left 5/20/2022    Procedure: PTA, PERIPHERAL VESSEL;  Surgeon: Keo Jenkins MD;  Location: [facility] CATH LAB/EP;  Service: Cardiology;  Laterality: Left;    PERCUTANEOUS TRANSLUMINAL ANGIOPLASTY (PTA) OF PERIPHERAL VESSEL Right 8/12/2022    Procedure: PTA, PERIPHERAL VESSEL;  Surgeon: eKo Jenkins MD;  Location: [facility] CATH LAB/EP;  Service: Cardiology;  Laterality: Right;    PERCUTANEOUS TRANSLUMINAL BALLOON ANGIOPLASTY OF CORONARY ARTERY  4/25/2023    Procedure: Angioplasty-coronary;  Surgeon: Keo Jenkins MD;  Location: [facility] CATH LAB/EP;  Service: Cardiology;;    PLACEMENT OF ARTERIOVENOUS GRAFT Left 4/15/2024     Procedure: INSERTION, GRAFT, ARTERIOVENOUS;  Surgeon: Scott Pascual MD;  Location: [facility] OR;  Service: General;  Laterality: Left;    PTCA, SINGLE VESSEL  5/16/2023    Procedure: PTCA, Single Vessel;  Surgeon: Keo Jenkins MD;  Location: [facility] CATH LAB/EP;  Service: Cardiology;;  CX    REMOVAL OF HEMODIALYSIS CATHETER Right 2/9/2024    Procedure: REMOVAL, CATHETER, HEMODIALYSIS;  Surgeon: Wang Mendieta MD;  Location: [facility] OR;  Service: General;  Laterality: Right;    REMOVAL OF TUNNELED CENTRAL VENOUS CATHETER (CVC) Right 5/20/2024    Procedure: REMOVAL, CATHETER, CENTRAL VENOUS, TUNNELED;  Surgeon: Scott Pascual MD;  Location: [facility] OR;  Service: General;  Laterality: Right;    STENT, DRUG ELUTING, SINGLE VESSEL, CORONARY  4/25/2023    Procedure: Stent, Drug Eluting, Single Vessel, Coronary;  Surgeon: Keo Jenkins MD;  Location: [facility] CATH LAB/EP;  Service: Cardiology;;    STENT, DRUG ELUTING, SINGLE VESSEL, CORONARY  5/16/2023    Procedure: Stent, Drug Eluting, Single Vessel, Coronary;  Surgeon: Keo Jenkins MD;  Location: [facility] CATH LAB/EP;  Service: Cardiology;;  CX    TRANSESOPHAGEAL ECHOCARDIOGRAM WITH POSSIBLE CARDIOVERSION (JOSE W/ POSS CARDIOVERSION) N/A 6/19/2023    Procedure: Transesophageal echo (JOSE) intra-procedure log documentation;  Surgeon: Keo Jenkins MD;  Location: [facility] CATH LAB/EP;  Service: Cardiology;  Laterality: N/A;       Review of patient's allergies indicates:   Allergen Reactions    Ace inhibitors Rash       Current Facility-Administered Medications on File Prior to Encounter   Medication    sodium chloride 0.9% infusion     Current Outpatient Medications on File Prior to Encounter   Medication Sig    albuterol (PROVENTIL/VENTOLIN HFA) 90 mcg/actuation inhaler INHALE 2 PUFFS INTO THE LUNGS EVERY 6 HOURS AS NEEDED FOR WHEEZING    albuterol-ipratropium (DUO-NEB) 2.5 mg-0.5 mg/3 mL nebulizer solution Take 3 mLs by nebulization every 6 (six) hours as needed for  Wheezing or Shortness of Breath (or cough). Rescue    amLODIPine (NORVASC) 5 MG tablet Take 1 tablet (5 mg total) by mouth once daily.    apixaban (ELIQUIS) 5 mg Tab Take 1 tablet (5 mg total) by mouth 2 (two) times daily.    carvediloL (COREG) 25 MG tablet Take 1 tablet (25 mg total) by mouth 2 (two) times daily with meals.    clopidogreL (PLAVIX) 75 mg tablet Take 1 tablet (75 mg total) by mouth once daily.    coenzyme Q10 100 mg capsule Take 100 mg by mouth once daily.    eplerenone (INSPRA) 50 MG Tab Take 1 tablet (50 mg total) by mouth once daily.    epoetin robi-epbx (RETACRIT) 40,000 unit/mL injection Inject 0.1 mLs (4,000 Units total) into the skin every 30 days.    hydrALAZINE (APRESOLINE) 50 MG tablet Take 1 tablet (50 mg total) by mouth every 8 (eight) hours.    HYDROcodone-acetaminophen (NORCO) 5-325 mg per tablet Take 1 tablet by mouth every 6 (six) hours as needed for Pain.    HYDROcodone-acetaminophen (NORCO) 5-325 mg per tablet Take 1 tablet by mouth every 6 (six) hours as needed for Pain.    hydrOXYzine pamoate (VISTARIL) 25 MG Cap TAKE 1 CAPSULE(25 MG) BY MOUTH EVERY NIGHT AS NEEDED FOR INSOMNIA OR ANXIETY    isosorbide mononitrate (IMDUR) 30 MG 24 hr tablet Take 1 tablet (30 mg total) by mouth once daily.    levothyroxine (SYNTHROID) 50 MCG tablet Take 50 mcg by mouth every morning.    levothyroxine (SYNTHROID) 75 MCG tablet Take 1 tablet (75 mcg total) by mouth before breakfast.    LIDOcaine (LIDODERM) 5 % Place 1 patch onto the skin daily as needed (back pain). Remove & Discard patch within 12 hours or as directed by MD    nitroGLYCERIN (NITROSTAT) 0.4 MG SL tablet Place 1 tablet (0.4 mg total) under the tongue every 5 (five) minutes as needed for Chest pain.    ranolazine (RANEXA) 500 MG Tb12 Take 1 tablet (500 mg total) by mouth 2 (two) times daily.    rosuvastatin (CRESTOR) 40 MG Tab Take 1 tablet (40 mg total) by mouth every evening.    sodium bicarbonate 650 MG tablet Take 1 tablet (650 mg  total) by mouth once daily. for 30 doses     Family History       Problem Relation (Age of Onset)    Aneurysm Mother    Cancer Father    Diabetes Sister    Heart disease Mother    Hypertension Mother, Sister    No Known Problems Brother, Son          Tobacco Use    Smoking status: Former     Current packs/day: 0.00     Types: Cigarettes     Quit date: 1999     Years since quittin.1    Smokeless tobacco: Never   Substance and Sexual Activity    Alcohol use: No     Alcohol/week: 0.0 standard drinks of alcohol    Drug use: No    Sexual activity: Yes     Partners: Female     Review of Systems   Constitutional:  Negative for activity change, appetite change and chills.   HENT: Negative.  Negative for congestion.    Eyes:  Negative for photophobia and visual disturbance.   Respiratory:  Positive for chest tightness. Negative for apnea and shortness of breath.    Cardiovascular:  Positive for chest pain. Negative for palpitations and leg swelling.   Gastrointestinal:  Negative for abdominal distention, abdominal pain and diarrhea.   Endocrine: Negative for cold intolerance and heat intolerance.   Musculoskeletal:  Negative for joint swelling.   Skin:  Negative for color change and pallor.   Neurological:  Negative for dizziness and headaches.   Psychiatric/Behavioral:  Negative for agitation and behavioral problems.      Objective:     Vital Signs (Most Recent):  Temp: 98.3 °F (36.8 °C) (24 0027)  Pulse: 65 (24 0117)  Resp: 20 (24 0111)  BP: 135/65 (24 0117)  SpO2: 98 % (24 0117) Vital Signs (24h Range):  Temp:  [98.3 °F (36.8 °C)] 98.3 °F (36.8 °C)  Pulse:  [64-78] 65  Resp:  [18-25] 20  SpO2:  [96 %-98 %] 98 %  BP: (120-146)/(58-67) 135/65     Weight: 81.6 kg (180 lb)  Body mass index is 25.83 kg/m².     Physical Exam  Constitutional:       Appearance: Normal appearance.   HENT:      Head: Normocephalic and atraumatic.   Cardiovascular:      Rate and Rhythm: Normal rate and regular  rhythm.   Pulmonary:      Effort: No respiratory distress.      Breath sounds: No stridor.   Abdominal:      General: Bowel sounds are normal. There is no distension.   Musculoskeletal:         General: No swelling or deformity.      Right lower leg: No edema.      Left lower leg: No edema.   Skin:     General: Skin is warm and dry.   Neurological:      General: No focal deficit present.      Mental Status: He is alert and oriented to person, place, and time. Mental status is at baseline.      Cranial Nerves: No cranial nerve deficit.   Psychiatric:         Mood and Affect: Mood normal.         Behavior: Behavior normal.                Significant Labs: All pertinent labs within the past 24 hours have been reviewed.    Significant Imaging: I have reviewed all pertinent imaging results/findings within the past 24 hours.

## 2024-06-25 NOTE — CONSULTS
Food & Nutrition  Education    Diet Education: Cardiac Education  Time Spent: RD remote  Learners: Patient       Nutrition Education provided with handouts:   + Clinical Reference attachments to d/c documents    Nutrition Related Social Determinants of Health: SDOH: Unable to assess at this time.     Comments:  Patient is NPO at this time. Patient admitted with NSTEMI diagnosis with PMH of ERSD on HD and COPD.  Patient remains in the ED at this time.   Labs reviewed.  NKFA. LBM:UMER   I/O since admit -150mL.  Education handouts attached to discharge paperwork.  RD to continue to monitor NPO status and follow up with educations at appropriate time.      Patient Active Problem List   Diagnosis    HTN (hypertension)    Claudication in peripheral vascular disease    DJD (degenerative joint disease), lumbar    Hyperlipidemia    Atherosclerosis of native artery of both lower extremities with intermittent claudication    Venous insufficiency of both lower extremities    Abnormal cardiovascular stress test    Coronary artery disease involving native coronary artery of native heart with angina pectoris with documented spasm    Bilateral carotid artery stenosis    Cardiac arrest    Nephrotic range proteinuria    Nuclear sclerosis, bilateral    COPD (chronic obstructive pulmonary disease)    Anemia due to chronic kidney disease, on chronic dialysis    Persistent atrial fibrillation    Hypothyroidism    Unstable angina    Paroxysmal atrial fibrillation    EBV infection    CMV (cytomegalovirus infection)    Closed fracture of transverse process of lumbar vertebra    ESRD (end stage renal disease) on dialysis    Fall    Membranous nephropathy determined by biopsy    S/P arteriovenous (AV) graft placement    Hemodialysis catheter dysfunction    NSTEMI (non-ST elevated myocardial infarction)     Past Medical History:   Diagnosis Date    Allergy     Anemia     Anticoagulant long-term use     Arthritis     CKD (chronic kidney disease)  stage 4, GFR 15-29 ml/min     COPD (chronic obstructive pulmonary disease) 08/20/2021    Coronary artery disease     Heart attack 10/04/2019    Hematuria     Hemothorax     Hyperlipidemia     Hypertension     Hyperuricemia     Hypocalcemia     Hypokalemia     Hypophosphatemia     Hypothyroidism 3/2/2023    PAD (peripheral artery disease)     Proteinuria     Vitamin D deficiency      BMP  Lab Results   Component Value Date     06/25/2024    K 3.0 (L) 06/25/2024     06/25/2024    CO2 22 (L) 06/25/2024    BUN 35 (H) 06/25/2024    CREATININE 4.6 (H) 06/25/2024    CALCIUM 8.8 06/25/2024    ANIONGAP 14 06/25/2024    EGFRNORACEVR 14 (A) 06/25/2024     Lab Results   Component Value Date    HGBA1C 4.6 06/25/2024     Lab Results   Component Value Date    CALCIUM 8.8 06/25/2024    PHOS 2.5 (L) 03/14/2024       Scheduled Meds:   amLODIPine  5 mg Oral Daily    aspirin  81 mg Oral Daily    atorvastatin  80 mg Oral Daily    carvediloL  25 mg Oral BID    clopidogreL  75 mg Oral Daily    hydrALAZINE  50 mg Oral Q8H    isosorbide mononitrate  30 mg Oral Daily    levothyroxine  75 mcg Oral Before breakfast    potassium chloride  40 mEq Oral Once    ranolazine  500 mg Oral BID    sodium bicarbonate  650 mg Oral Daily    spironolactone  25 mg Oral Daily     Continuous Infusions:   heparin (porcine) in D5W  0-40 Units/kg/hr (Adjusted) Intravenous Continuous         PRN Meds:.  Current Facility-Administered Medications:     albuterol-ipratropium, 3 mL, Nebulization, Q6H PRN    heparin (PORCINE), 52.4 Units/kg (Adjusted), Intravenous, PRN    heparin (PORCINE), 30 Units/kg (Adjusted), Intravenous, PRN    hydrOXYzine pamoate, 25 mg, Oral, Nightly PRN    LIDOcaine, 1 patch, Transdermal, Daily PRN    nitroGLYCERIN, 0.4 mg, Sublingual, Q5 Min PRN       All questions and concerns answered. Dietitian's contact information provided.       Follow-Up: Yes     Please Re-consult as needed        Thanks  Gauri Vega, MS, RDN, LDN

## 2024-06-25 NOTE — SUBJECTIVE & OBJECTIVE
Past Medical History:   Diagnosis Date    Allergy     Anemia     Anticoagulant long-term use     Arthritis     CKD (chronic kidney disease) stage 4, GFR 15-29 ml/min     COPD (chronic obstructive pulmonary disease) 08/20/2021    Coronary artery disease     Heart attack 10/04/2019    Hematuria     Hemothorax     Hyperlipidemia     Hypertension     Hyperuricemia     Hypocalcemia     Hypokalemia     Hypophosphatemia     Hypothyroidism 3/2/2023    PAD (peripheral artery disease)     Proteinuria     Vitamin D deficiency        Past Surgical History:   Procedure Laterality Date    ABDOMINAL AORTOGRAPHY N/A 5/10/2019    Procedure: AORTOGRAM-ABDOMINAL;  Surgeon: Keo Jenkins MD;  Location: [facility] CATH LAB/EP;  Service: Cardiology;  Laterality: N/A;  RSFA intervention     AORTOGRAPHY WITH SERIALOGRAPHY N/A 12/3/2021    Procedure: AORTOGRAM, WITH SERIALOGRAPHY;  Surgeon: Keo Jenkins MD;  Location: [facility] CATH LAB/EP;  Service: Cardiology;  Laterality: N/A;    AORTOGRAPHY WITH SERIALOGRAPHY N/A 4/1/2022    Procedure: AORTOGRAM, WITH SERIALOGRAPHY;  Surgeon: Keo Jenkins MD;  Location: [facility] CATH LAB/EP;  Service: Cardiology;  Laterality: N/A;    ATHERECTOMY, CORONARY N/A 4/25/2023    Procedure: Atherectomy-coronary;  Surgeon: Keo Jenkins MD;  Location: [facility] CATH LAB/EP;  Service: Cardiology;  Laterality: N/A;    BONE MARROW BIOPSY Right 10/12/2021    Procedure: BIOPSY-BONE MARROW;  Surgeon: Naseem Woods MD;  Location: [facility] OR;  Service: Oncology;  Laterality: Right;    CARDIAC CATHETERIZATION      CATARACT EXTRACTION      CATARACT EXTRACTION W/  INTRAOCULAR LENS IMPLANT Right 6/8/2020    Procedure: EXTRACTION, CATARACT, WITH IOL INSERTION;  Surgeon: Tin Light MD;  Location: [facility] OR;  Service: Ophthalmology;  Laterality: Right;    CATARACT EXTRACTION W/  INTRAOCULAR LENS IMPLANT Left 7/2/2020    Procedure: EXTRACTION, CATARACT, WITH IOL INSERTION;  Surgeon: Tin Light MD;  Location: [facility] OR;  Service:  Ophthalmology;  Laterality: Left;    COLONOSCOPY N/A 1/6/2022    Procedure: COLONOSCOPY;  Surgeon: Kathe Penaloza MD;  Location: [facility] ENDO (2ND FLR);  Service: Endoscopy;  Laterality: N/A;  COVID test at [facility] on 1/3-GT  okay to hold Plavix for 5 days and aspirin per Dr. Becerra  2nd floor due toextensive cardiac history   instructions mailed and my [facility] portal -     CORONARY ANGIOGRAPHY N/A 3/29/2019    Procedure: ANGIOGRAM, CORONARY ARTERY;  Surgeon: Keo Jenkins MD;  Location: [facility] CATH LAB/EP;  Service: Cardiology;  Laterality: N/A;    CORONARY ANGIOGRAPHY Left 10/11/2019    Procedure: ANGIOGRAM, CORONARY ARTERY;  Surgeon: Keo Jenkins MD;  Location: [facility] CATH LAB/EP;  Service: Cardiology;  Laterality: Left;    CORONARY ANGIOGRAPHY N/A 4/10/2023    Procedure: ANGIOGRAM, CORONARY ARTERY;  Surgeon: Keo Jenkins MD;  Location: [facility] CATH LAB/EP;  Service: Cardiology;  Laterality: N/A;    CORONARY ANGIOPLASTY WITH STENT PLACEMENT  03/29/2019    mid and distal RCA    ESOPHAGOGASTRODUODENOSCOPY N/A 1/6/2022    Procedure: EGD (ESOPHAGOGASTRODUODENOSCOPY);  Surgeon: Kathe Penaloza MD;  Location: [facility] ENDO (2ND FLR);  Service: Endoscopy;  Laterality: N/A;    EYE SURGERY      INSERTION OF TUNNELED CENTRAL VENOUS HEMODIALYSIS CATHETER N/A 2/9/2024    Procedure: INSERTION, CATHETER, HEMODIALYSIS, DUAL LUMEN;  Surgeon: Wang Mendieta MD;  Location: [facility] OR;  Service: General;  Laterality: N/A;    INSTANTANEOUS WAVE-FREE RATIO (IFR) N/A 4/25/2023    Procedure: Instantaneous Wave-Free Ratio (IFR);  Surgeon: Keo Jenkins MD;  Location: [facility] CATH LAB/EP;  Service: Cardiology;  Laterality: N/A;    INTRAVASCULAR ULTRASOUND, NON-CORONARY  8/12/2022    Procedure: Intravascular Ultrasound, Non-Coronary;  Surgeon: Keo Jenkins MD;  Location: [facility] CATH LAB/EP;  Service: Cardiology;;    IVUS, CORONARY  4/25/2023    Procedure: IVUS, Coronary;  Surgeon: Keo Jenkins MD;  Location: [facility] CATH LAB/EP;  Service:  Cardiology;;    IVUS, CORONARY  5/16/2023    Procedure: IVUS, Coronary;  Surgeon: Keo Jenkins MD;  Location: [facility] CATH LAB/EP;  Service: Cardiology;;  CX    LEFT HEART CATHETERIZATION N/A 3/29/2019    Procedure: Left heart cath;  Surgeon: Keo Jenkins MD;  Location: [facility] CATH LAB/EP;  Service: Cardiology;  Laterality: N/A;    LEFT HEART CATHETERIZATION Left 10/8/2019    Procedure: Left heart cath;  Surgeon: Keo Jenkins MD;  Location: [facility] CATH LAB/EP;  Service: Cardiology;  Laterality: Left;    LEFT HEART CATHETERIZATION Left 4/10/2023    Procedure: Left heart cath;  Surgeon: Keo Jenkins MD;  Location: [facility] CATH LAB/EP;  Service: Cardiology;  Laterality: Left;    LEFT HEART CATHETERIZATION Left 4/25/2023    Procedure: Left heart cath;  Surgeon: Keo Jenkins MD;  Location: [facility] CATH LAB/EP;  Service: Cardiology;  Laterality: Left;    LEFT HEART CATHETERIZATION Left 5/16/2023    Procedure: Left heart cath;  Surgeon: Keo Jenkins MD;  Location: [facility] CATH LAB/EP;  Service: Cardiology;  Laterality: Left;    PERCUTANEOUS TRANSLUMINAL ANGIOPLASTY (PTA) OF PERIPHERAL VESSEL N/A 7/12/2019    Procedure: PTA, PERIPHERAL VESSEL;  Surgeon: Keo Jenkins MD;  Location: [facility] CATH LAB/EP;  Service: Cardiology;  Laterality: N/A;    PERCUTANEOUS TRANSLUMINAL ANGIOPLASTY (PTA) OF PERIPHERAL VESSEL Left 5/20/2022    Procedure: PTA, PERIPHERAL VESSEL;  Surgeon: Keo Jenkins MD;  Location: [facility] CATH LAB/EP;  Service: Cardiology;  Laterality: Left;    PERCUTANEOUS TRANSLUMINAL ANGIOPLASTY (PTA) OF PERIPHERAL VESSEL Right 8/12/2022    Procedure: PTA, PERIPHERAL VESSEL;  Surgeon: Keo Jenkins MD;  Location: [facility] CATH LAB/EP;  Service: Cardiology;  Laterality: Right;    PERCUTANEOUS TRANSLUMINAL BALLOON ANGIOPLASTY OF CORONARY ARTERY  4/25/2023    Procedure: Angioplasty-coronary;  Surgeon: Keo Jenkins MD;  Location: [facility] CATH LAB/EP;  Service: Cardiology;;    PLACEMENT OF ARTERIOVENOUS GRAFT Left 4/15/2024     Procedure: INSERTION, GRAFT, ARTERIOVENOUS;  Surgeon: Scott Pascual MD;  Location: [facility] OR;  Service: General;  Laterality: Left;    PTCA, SINGLE VESSEL  5/16/2023    Procedure: PTCA, Single Vessel;  Surgeon: Keo Jenkins MD;  Location: [facility] CATH LAB/EP;  Service: Cardiology;;  CX    REMOVAL OF HEMODIALYSIS CATHETER Right 2/9/2024    Procedure: REMOVAL, CATHETER, HEMODIALYSIS;  Surgeon: Wang Mendieta MD;  Location: [facility] OR;  Service: General;  Laterality: Right;    REMOVAL OF TUNNELED CENTRAL VENOUS CATHETER (CVC) Right 5/20/2024    Procedure: REMOVAL, CATHETER, CENTRAL VENOUS, TUNNELED;  Surgeon: Scott Pascual MD;  Location: [facility] OR;  Service: General;  Laterality: Right;    STENT, DRUG ELUTING, SINGLE VESSEL, CORONARY  4/25/2023    Procedure: Stent, Drug Eluting, Single Vessel, Coronary;  Surgeon: Keo Jenkins MD;  Location: [facility] CATH LAB/EP;  Service: Cardiology;;    STENT, DRUG ELUTING, SINGLE VESSEL, CORONARY  5/16/2023    Procedure: Stent, Drug Eluting, Single Vessel, Coronary;  Surgeon: Keo Jenkins MD;  Location: [facility] CATH LAB/EP;  Service: Cardiology;;  CX    TRANSESOPHAGEAL ECHOCARDIOGRAM WITH POSSIBLE CARDIOVERSION (JOSE W/ POSS CARDIOVERSION) N/A 6/19/2023    Procedure: Transesophageal echo (JOSE) intra-procedure log documentation;  Surgeon: Keo Jenkins MD;  Location: [facility] CATH LAB/EP;  Service: Cardiology;  Laterality: N/A;       Review of patient's allergies indicates:   Allergen Reactions    Ace inhibitors Rash       Current Facility-Administered Medications on File Prior to Encounter   Medication    sodium chloride 0.9% infusion     Current Outpatient Medications on File Prior to Encounter   Medication Sig    albuterol (PROVENTIL/VENTOLIN HFA) 90 mcg/actuation inhaler INHALE 2 PUFFS INTO THE LUNGS EVERY 6 HOURS AS NEEDED FOR WHEEZING    albuterol-ipratropium (DUO-NEB) 2.5 mg-0.5 mg/3 mL nebulizer solution Take 3 mLs by nebulization every 6 (six) hours as needed for  Wheezing or Shortness of Breath (or cough). Rescue    amLODIPine (NORVASC) 5 MG tablet Take 1 tablet (5 mg total) by mouth once daily.    apixaban (ELIQUIS) 5 mg Tab Take 1 tablet (5 mg total) by mouth 2 (two) times daily.    carvediloL (COREG) 25 MG tablet Take 1 tablet (25 mg total) by mouth 2 (two) times daily with meals.    clopidogreL (PLAVIX) 75 mg tablet Take 1 tablet (75 mg total) by mouth once daily.    coenzyme Q10 100 mg capsule Take 100 mg by mouth once daily.    eplerenone (INSPRA) 50 MG Tab Take 1 tablet (50 mg total) by mouth once daily.    epoetin robi-epbx (RETACRIT) 40,000 unit/mL injection Inject 0.1 mLs (4,000 Units total) into the skin every 30 days.    hydrALAZINE (APRESOLINE) 50 MG tablet Take 1 tablet (50 mg total) by mouth every 8 (eight) hours.    HYDROcodone-acetaminophen (NORCO) 5-325 mg per tablet Take 1 tablet by mouth every 6 (six) hours as needed for Pain.    HYDROcodone-acetaminophen (NORCO) 5-325 mg per tablet Take 1 tablet by mouth every 6 (six) hours as needed for Pain.    hydrOXYzine pamoate (VISTARIL) 25 MG Cap TAKE 1 CAPSULE(25 MG) BY MOUTH EVERY NIGHT AS NEEDED FOR INSOMNIA OR ANXIETY    isosorbide mononitrate (IMDUR) 30 MG 24 hr tablet Take 1 tablet (30 mg total) by mouth once daily.    levothyroxine (SYNTHROID) 50 MCG tablet Take 50 mcg by mouth every morning.    levothyroxine (SYNTHROID) 75 MCG tablet Take 1 tablet (75 mcg total) by mouth before breakfast.    LIDOcaine (LIDODERM) 5 % Place 1 patch onto the skin daily as needed (back pain). Remove & Discard patch within 12 hours or as directed by MD    nitroGLYCERIN (NITROSTAT) 0.4 MG SL tablet Place 1 tablet (0.4 mg total) under the tongue every 5 (five) minutes as needed for Chest pain.    ranolazine (RANEXA) 500 MG Tb12 Take 1 tablet (500 mg total) by mouth 2 (two) times daily.    rosuvastatin (CRESTOR) 40 MG Tab Take 1 tablet (40 mg total) by mouth every evening.    sodium bicarbonate 650 MG tablet Take 1 tablet (650 mg  total) by mouth once daily. for 30 doses     Family History       Problem Relation (Age of Onset)    Aneurysm Mother    Cancer Father    Diabetes Sister    Heart disease Mother    Hypertension Mother, Sister    No Known Problems Brother, Son          Tobacco Use    Smoking status: Former     Current packs/day: 0.00     Types: Cigarettes     Quit date: 1999     Years since quittin.1    Smokeless tobacco: Never   Substance and Sexual Activity    Alcohol use: No     Alcohol/week: 0.0 standard drinks of alcohol    Drug use: No    Sexual activity: Yes     Partners: Female     Review of Systems   Constitutional: Negative. Negative for diaphoresis.   HENT: Negative.     Eyes: Negative.    Cardiovascular:  Positive for chest pain and irregular heartbeat. Negative for leg swelling, near-syncope, orthopnea, palpitations, paroxysmal nocturnal dyspnea and syncope.   Respiratory: Negative.  Negative for cough and shortness of breath.    Endocrine: Negative.    Hematologic/Lymphatic: Negative.    Musculoskeletal: Negative.    Gastrointestinal:  Negative for nausea and vomiting.   Genitourinary: Negative.    Neurological: Negative.    Psychiatric/Behavioral: Negative.     Allergic/Immunologic: Negative.      Objective:     Vital Signs (Most Recent):  Temp: 98.2 °F (36.8 °C) (24 0700)  Pulse: 65 (24 0832)  Resp: 17 (24 0832)  BP: (!) 143/65 (24 0832)  SpO2: 95 % (24 08) Vital Signs (24h Range):  Temp:  [98.2 °F (36.8 °C)-98.3 °F (36.8 °C)] 98.2 °F (36.8 °C)  Pulse:  [63-78] 65  Resp:  [17-25] 17  SpO2:  [95 %-98 %] 95 %  BP: (120-146)/(58-67) 143/65     Weight: 81.6 kg (180 lb)  Body mass index is 25.83 kg/m².    SpO2: 95 %         Intake/Output Summary (Last 24 hours) at 2024 0943  Last data filed at 2024 0027  Gross per 24 hour   Intake 150 ml   Output 300 ml   Net -150 ml       Lines/Drains/Airways       Central Venous Catheter Line  Duration             Tunneled Central Line  Insertion/Assessment - Double Lumen  02/08/24 1000 137 days                     Physical Exam  Constitutional:       General: He is not in acute distress.     Appearance: He is not diaphoretic.   HENT:      Head: Atraumatic.   Eyes:      General:         Right eye: No discharge.         Left eye: No discharge.   Cardiovascular:      Rate and Rhythm: Normal rate and regular rhythm.   Pulmonary:      Effort: Pulmonary effort is normal.      Breath sounds: Normal breath sounds.   Abdominal:      General: Bowel sounds are normal.      Palpations: Abdomen is soft.   Musculoskeletal:      Right lower leg: No edema.      Left lower leg: No edema.   Skin:     General: Skin is warm and dry.   Neurological:      Mental Status: He is alert and oriented to person, place, and time.   Psychiatric:         Mood and Affect: Mood normal.         Behavior: Behavior normal.         Thought Content: Thought content normal.         Judgment: Judgment normal.          Significant Labs: BMP:   Recent Labs   Lab 06/25/24  0038   GLU 96      K 3.0*      CO2 22*   BUN 35*   CREATININE 4.6*   CALCIUM 8.8   , CMP   Recent Labs   Lab 06/25/24  0038      K 3.0*      CO2 22*   GLU 96   BUN 35*   CREATININE 4.6*   CALCIUM 8.8   PROT 6.9   ALBUMIN 3.1*   BILITOT 0.5   ALKPHOS 64   AST 17   ALT 8*   ANIONGAP 14   , CBC   Recent Labs   Lab 06/25/24  0038 06/25/24  0744   WBC 6.58 5.71   HGB 8.5* 8.1*   HCT 25.7* 24.9*    157   , INR   Recent Labs   Lab 06/25/24  0038 06/25/24  0744   INR 1.3* 1.3*   , Lipid Panel   Recent Labs   Lab 06/25/24  0038   CHOL 129   HDL 63   LDLCALC 55.2*   TRIG 54   CHOLHDL 48.8   , Troponin   Recent Labs   Lab 06/25/24  0038 06/25/24  0433 06/25/24  0744   TROPONINI 0.680* 0.708* 0.755*   , and All pertinent lab results from the last 24 hours have been reviewed.    Significant Imaging: Echocardiogram: Transthoracic echo (TTE) complete (Cupid Only):   Results for orders placed or performed  "during the hospital encounter of 06/25/24   Echo   Result Value Ref Range    BSA 2.01 m2    Child's Biplane MOD Ejection Fraction 41 %    A2C EF 42 %    A4C EF 43 %    LVOT stroke volume 96.52 cm3    LVIDd 6.06 (A) 3.5 - 6.0 cm    LV Systolic Volume 111.46 mL    LV Systolic Volume Index 55.7 mL/m2    LVIDs 4.87 (A) 2.1 - 4.0 cm    LV ESV A2C 84.19 mL    LV Diastolic Volume 184.48 mL    LV ESV A4C 34.93 mL    LV Diastolic Volume Index 92.24 mL/m2    LV EDV A2C 139.328677078730085 mL    LV EDV A4C 132.34 mL    Left Ventricular End Systolic Volume by Teichholz Method 111.46 mL    Left Ventricular End Diastolic Volume by Teichholz Method 184.48 mL    IVS 1.22 (A) 0.6 - 1.1 cm    LVOT diameter 2.13 cm    LVOT area 3.6 cm2    FS 20 (A) 28 - 44 %    Left Ventricle Relative Wall Thickness 0.37 cm    Posterior Wall 1.13 (A) 0.6 - 1.1 cm    LV mass 310.34 g    LV Mass Index 155 g/m2    MV Peak E Jakob 1.41 m/s    TDI LATERAL 0.08 m/s    TDI SEPTAL 0.05 m/s    E/E' ratio 21.69 m/s    MV Peak A Jakob 0.37 m/s    TR Max Jakob 3.11 m/s    E/A ratio 3.81     E wave deceleration time 175.32 msec    MV "A" wave duration 82.914004339134811 msec    LV SEPTAL E/E' RATIO 28.20 m/s    LV LATERAL E/E' RATIO 17.63 m/s    PV Peak S Jakob 0.54 m/s    PV Peak D Jakob 0.91 m/s    Pulm vein S/D ratio 0.59     LVOT peak jakob 1.30 m/s    Left Ventricular Outflow Tract Mean Velocity 0.78 cm/s    Left Ventricular Outflow Tract Mean Gradient 2.93 mmHg    RV S' 12.41 cm/s    TAPSE 2.45 cm    LA size 3.86 cm    Left Atrium Minor Axis 5.93 cm    Left Atrium Major Axis 5.26 cm    LA volume (mod) 58.18 cm3    LA Volume Index (Mod) 29.1 mL/m2    RA Major Axis 4.80 cm    RA Width 4.37 cm    Vn Nyquist MS 0.37 m/s    AV mean gradient 5 mmHg    AV peak gradient 9 mmHg    Ao peak jakob 1.47 m/s    Ao VTI 33.20 cm    LVOT peak VTI 27.10 cm    AV valve area 2.91 cm²    AV Velocity Ratio 0.88     AV index (prosthetic) 0.82     JILL by Velocity Ratio 3.15 cm²    Radius 0.94 " cm    Vn Nyquist 0.37 m/s    MV mean gradient 2 mmHg    MV peak gradient 8 mmHg    MV stenosis pressure 1/2 time 50.84 ms    MV valve area p 1/2 method 4.33 cm2    MV valve area by continuity eq 3.04 cm2    MV VTI 31.7 cm    Triscuspid Valve Regurgitation Peak Gradient 39 mmHg    PV PEAK VELOCITY 1.18 m/s    PV peak gradient 6 mmHg    Pulmonary Valve Mean Velocity 0.80 m/s    Sinus 3.89 cm    STJ 3.40 cm    Ascending aorta 3.44 cm    IVC diameter 2.05 cm    Mean e' 0.07 m/s    ZLVIDS 2.44     ZLVIDD 0.39     LA area A4C 15.13 cm2    LA area A2C 25.69 cm2    LA Volume Index 31.1 mL/m2    LA volume 62.19 cm3    LA WIDTH 3.4 cm

## 2024-06-26 LAB
ALBUMIN SERPL BCP-MCNC: 2.8 G/DL (ref 3.5–5.2)
ANION GAP SERPL CALC-SCNC: 14 MMOL/L (ref 8–16)
APTT PPP: 38.5 SEC (ref 21–32)
BASOPHILS # BLD AUTO: 0.04 K/UL (ref 0–0.2)
BASOPHILS NFR BLD: 0.6 % (ref 0–1.9)
BUN SERPL-MCNC: 41 MG/DL (ref 8–23)
CALCIUM SERPL-MCNC: 9 MG/DL (ref 8.7–10.5)
CHLORIDE SERPL-SCNC: 104 MMOL/L (ref 95–110)
CO2 SERPL-SCNC: 20 MMOL/L (ref 23–29)
CREAT SERPL-MCNC: 4.7 MG/DL (ref 0.5–1.4)
DIFFERENTIAL METHOD BLD: ABNORMAL
EOSINOPHIL # BLD AUTO: 0.2 K/UL (ref 0–0.5)
EOSINOPHIL NFR BLD: 2.8 % (ref 0–8)
ERYTHROCYTE [DISTWIDTH] IN BLOOD BY AUTOMATED COUNT: 15.3 % (ref 11.5–14.5)
EST. GFR  (NO RACE VARIABLE): 13 ML/MIN/1.73 M^2
GLUCOSE SERPL-MCNC: 88 MG/DL (ref 70–110)
HCT VFR BLD AUTO: 28.6 % (ref 40–54)
HGB BLD-MCNC: 9.1 G/DL (ref 14–18)
IMM GRANULOCYTES # BLD AUTO: 0.03 K/UL (ref 0–0.04)
IMM GRANULOCYTES NFR BLD AUTO: 0.5 % (ref 0–0.5)
IRON SERPL-MCNC: 42 UG/DL (ref 45–160)
LYMPHOCYTES # BLD AUTO: 0.8 K/UL (ref 1–4.8)
LYMPHOCYTES NFR BLD: 12.7 % (ref 18–48)
MAGNESIUM SERPL-MCNC: 1.8 MG/DL (ref 1.6–2.6)
MCH RBC QN AUTO: 27.7 PG (ref 27–31)
MCHC RBC AUTO-ENTMCNC: 31.8 G/DL (ref 32–36)
MCV RBC AUTO: 87 FL (ref 82–98)
MONOCYTES # BLD AUTO: 0.5 K/UL (ref 0.3–1)
MONOCYTES NFR BLD: 7.5 % (ref 4–15)
NEUTROPHILS # BLD AUTO: 4.8 K/UL (ref 1.8–7.7)
NEUTROPHILS NFR BLD: 75.9 % (ref 38–73)
NRBC BLD-RTO: 0 /100 WBC
PHOSPHATE SERPL-MCNC: 3.8 MG/DL (ref 2.7–4.5)
PLATELET # BLD AUTO: 152 K/UL (ref 150–450)
PMV BLD AUTO: 10.6 FL (ref 9.2–12.9)
POC ACTIVATED CLOTTING TIME K: 195 SEC (ref 74–137)
POC ACTIVATED CLOTTING TIME K: 207 SEC (ref 74–137)
POC ACTIVATED CLOTTING TIME K: 226 SEC (ref 74–137)
POC ACTIVATED CLOTTING TIME K: 244 SEC (ref 74–137)
POC ACTIVATED CLOTTING TIME K: 262 SEC (ref 74–137)
POC ACTIVATED CLOTTING TIME K: 269 SEC (ref 74–137)
POC ACTIVATED CLOTTING TIME K: 287 SEC (ref 74–137)
POC ACTIVATED CLOTTING TIME K: 293 SEC (ref 74–137)
POC ACTIVATED CLOTTING TIME K: 305 SEC (ref 74–137)
POCT GLUCOSE: 84 MG/DL (ref 70–110)
POTASSIUM SERPL-SCNC: 3.3 MMOL/L (ref 3.5–5.1)
PTH-INTACT SERPL-MCNC: 215.6 PG/ML (ref 9–77)
RBC # BLD AUTO: 3.28 M/UL (ref 4.6–6.2)
SAMPLE: ABNORMAL
SATURATED IRON: 14 % (ref 20–50)
SODIUM SERPL-SCNC: 138 MMOL/L (ref 136–145)
TOTAL IRON BINDING CAPACITY: 303 UG/DL (ref 250–450)
TRANSFERRIN SERPL-MCNC: 205 MG/DL (ref 200–375)
WBC # BLD AUTO: 6.38 K/UL (ref 3.9–12.7)

## 2024-06-26 PROCEDURE — 27000221 HC OXYGEN, UP TO 24 HOURS

## 2024-06-26 PROCEDURE — 4A023N7 MEASUREMENT OF CARDIAC SAMPLING AND PRESSURE, LEFT HEART, PERCUTANEOUS APPROACH: ICD-10-PCS | Performed by: STUDENT IN AN ORGANIZED HEALTH CARE EDUCATION/TRAINING PROGRAM

## 2024-06-26 PROCEDURE — C1725 CATH, TRANSLUMIN NON-LASER: HCPCS | Performed by: STUDENT IN AN ORGANIZED HEALTH CARE EDUCATION/TRAINING PROGRAM

## 2024-06-26 PROCEDURE — 93005 ELECTROCARDIOGRAM TRACING: CPT

## 2024-06-26 PROCEDURE — 84100 ASSAY OF PHOSPHORUS: CPT | Performed by: INTERNAL MEDICINE

## 2024-06-26 PROCEDURE — 94761 N-INVAS EAR/PLS OXIMETRY MLT: CPT

## 2024-06-26 PROCEDURE — 36415 COLL VENOUS BLD VENIPUNCTURE: CPT | Performed by: INTERNAL MEDICINE

## 2024-06-26 PROCEDURE — 27201423 OPTIME MED/SURG SUP & DEVICES STERILE SUPPLY: Performed by: STUDENT IN AN ORGANIZED HEALTH CARE EDUCATION/TRAINING PROGRAM

## 2024-06-26 PROCEDURE — 92920 PRQ TRLUML C ANGIOP 1ART&/BR: CPT | Mod: LC,,, | Performed by: STUDENT IN AN ORGANIZED HEALTH CARE EDUCATION/TRAINING PROGRAM

## 2024-06-26 PROCEDURE — C1887 CATHETER, GUIDING: HCPCS | Performed by: STUDENT IN AN ORGANIZED HEALTH CARE EDUCATION/TRAINING PROGRAM

## 2024-06-26 PROCEDURE — 92921 PR PTCA, ADD'L VESSEL: CPT | Mod: ,,, | Performed by: STUDENT IN AN ORGANIZED HEALTH CARE EDUCATION/TRAINING PROGRAM

## 2024-06-26 PROCEDURE — 85730 THROMBOPLASTIN TIME PARTIAL: CPT | Performed by: INTERNAL MEDICINE

## 2024-06-26 PROCEDURE — 02703ZZ DILATION OF CORONARY ARTERY, ONE ARTERY, PERCUTANEOUS APPROACH: ICD-10-PCS | Performed by: STUDENT IN AN ORGANIZED HEALTH CARE EDUCATION/TRAINING PROGRAM

## 2024-06-26 PROCEDURE — 99153 MOD SED SAME PHYS/QHP EA: CPT | Performed by: STUDENT IN AN ORGANIZED HEALTH CARE EDUCATION/TRAINING PROGRAM

## 2024-06-26 PROCEDURE — 99152 MOD SED SAME PHYS/QHP 5/>YRS: CPT | Mod: ,,, | Performed by: STUDENT IN AN ORGANIZED HEALTH CARE EDUCATION/TRAINING PROGRAM

## 2024-06-26 PROCEDURE — 82040 ASSAY OF SERUM ALBUMIN: CPT | Performed by: INTERNAL MEDICINE

## 2024-06-26 PROCEDURE — 92920 PRQ TRLUML C ANGIOP 1ART&/BR: CPT | Mod: LC | Performed by: STUDENT IN AN ORGANIZED HEALTH CARE EDUCATION/TRAINING PROGRAM

## 2024-06-26 PROCEDURE — 83970 ASSAY OF PARATHORMONE: CPT | Performed by: INTERNAL MEDICINE

## 2024-06-26 PROCEDURE — 85347 COAGULATION TIME ACTIVATED: CPT | Performed by: STUDENT IN AN ORGANIZED HEALTH CARE EDUCATION/TRAINING PROGRAM

## 2024-06-26 PROCEDURE — 92921 HC PTCA , ADD'L BRANCH: CPT | Performed by: STUDENT IN AN ORGANIZED HEALTH CARE EDUCATION/TRAINING PROGRAM

## 2024-06-26 PROCEDURE — C1769 GUIDE WIRE: HCPCS | Performed by: STUDENT IN AN ORGANIZED HEALTH CARE EDUCATION/TRAINING PROGRAM

## 2024-06-26 PROCEDURE — 83540 ASSAY OF IRON: CPT | Performed by: INTERNAL MEDICINE

## 2024-06-26 PROCEDURE — B2111ZZ FLUOROSCOPY OF MULTIPLE CORONARY ARTERIES USING LOW OSMOLAR CONTRAST: ICD-10-PCS | Performed by: STUDENT IN AN ORGANIZED HEALTH CARE EDUCATION/TRAINING PROGRAM

## 2024-06-26 PROCEDURE — 99152 MOD SED SAME PHYS/QHP 5/>YRS: CPT | Performed by: STUDENT IN AN ORGANIZED HEALTH CARE EDUCATION/TRAINING PROGRAM

## 2024-06-26 PROCEDURE — 93458 L HRT ARTERY/VENTRICLE ANGIO: CPT | Mod: 26,59,51, | Performed by: STUDENT IN AN ORGANIZED HEALTH CARE EDUCATION/TRAINING PROGRAM

## 2024-06-26 PROCEDURE — 99900035 HC TECH TIME PER 15 MIN (STAT)

## 2024-06-26 PROCEDURE — 93458 L HRT ARTERY/VENTRICLE ANGIO: CPT | Mod: 59 | Performed by: STUDENT IN AN ORGANIZED HEALTH CARE EDUCATION/TRAINING PROGRAM

## 2024-06-26 PROCEDURE — 25000003 PHARM REV CODE 250: Performed by: STUDENT IN AN ORGANIZED HEALTH CARE EDUCATION/TRAINING PROGRAM

## 2024-06-26 PROCEDURE — 25000003 PHARM REV CODE 250: Performed by: NURSE PRACTITIONER

## 2024-06-26 PROCEDURE — 83735 ASSAY OF MAGNESIUM: CPT | Performed by: INTERNAL MEDICINE

## 2024-06-26 PROCEDURE — C1753 CATH, INTRAVAS ULTRASOUND: HCPCS | Performed by: STUDENT IN AN ORGANIZED HEALTH CARE EDUCATION/TRAINING PROGRAM

## 2024-06-26 PROCEDURE — 93010 ELECTROCARDIOGRAM REPORT: CPT | Mod: ,,, | Performed by: INTERNAL MEDICINE

## 2024-06-26 PROCEDURE — 85025 COMPLETE CBC W/AUTO DIFF WBC: CPT | Performed by: STUDENT IN AN ORGANIZED HEALTH CARE EDUCATION/TRAINING PROGRAM

## 2024-06-26 PROCEDURE — 20000000 HC ICU ROOM

## 2024-06-26 PROCEDURE — 80048 BASIC METABOLIC PNL TOTAL CA: CPT | Performed by: INTERNAL MEDICINE

## 2024-06-26 PROCEDURE — 25500020 PHARM REV CODE 255: Performed by: STUDENT IN AN ORGANIZED HEALTH CARE EDUCATION/TRAINING PROGRAM

## 2024-06-26 PROCEDURE — 63600175 PHARM REV CODE 636 W HCPCS: Mod: JZ,JG | Performed by: STUDENT IN AN ORGANIZED HEALTH CARE EDUCATION/TRAINING PROGRAM

## 2024-06-26 PROCEDURE — C1894 INTRO/SHEATH, NON-LASER: HCPCS | Performed by: STUDENT IN AN ORGANIZED HEALTH CARE EDUCATION/TRAINING PROGRAM

## 2024-06-26 RX ORDER — FENTANYL CITRATE 50 UG/ML
INJECTION, SOLUTION INTRAMUSCULAR; INTRAVENOUS
Status: DISCONTINUED | OUTPATIENT
Start: 2024-06-26 | End: 2024-06-26 | Stop reason: HOSPADM

## 2024-06-26 RX ORDER — HEPARIN SODIUM 200 [USP'U]/100ML
INJECTION, SOLUTION INTRAVENOUS
Status: DISCONTINUED | OUTPATIENT
Start: 2024-06-26 | End: 2024-06-27 | Stop reason: HOSPADM

## 2024-06-26 RX ORDER — MIDAZOLAM HYDROCHLORIDE 1 MG/ML
INJECTION, SOLUTION INTRAMUSCULAR; INTRAVENOUS
Status: DISCONTINUED | OUTPATIENT
Start: 2024-06-26 | End: 2024-06-26 | Stop reason: HOSPADM

## 2024-06-26 RX ORDER — IODIXANOL 320 MG/ML
INJECTION, SOLUTION INTRAVASCULAR
Status: DISCONTINUED | OUTPATIENT
Start: 2024-06-26 | End: 2024-06-26 | Stop reason: HOSPADM

## 2024-06-26 RX ORDER — SODIUM CHLORIDE 9 MG/ML
INJECTION, SOLUTION INTRAVENOUS ONCE
OUTPATIENT
Start: 2024-06-26 | End: 2024-06-26

## 2024-06-26 RX ORDER — MUPIROCIN 20 MG/G
OINTMENT TOPICAL 2 TIMES DAILY
OUTPATIENT
Start: 2024-06-26 | End: 2024-07-01

## 2024-06-26 RX ORDER — LIDOCAINE HYDROCHLORIDE 10 MG/ML
INJECTION, SOLUTION EPIDURAL; INFILTRATION; INTRACAUDAL; PERINEURAL
Status: DISCONTINUED | OUTPATIENT
Start: 2024-06-26 | End: 2024-06-26 | Stop reason: HOSPADM

## 2024-06-26 RX ORDER — PHENYLEPHRINE HCL IN 0.9% NACL 1 MG/10 ML
SYRINGE (ML) INTRAVENOUS
Status: DISCONTINUED | OUTPATIENT
Start: 2024-06-26 | End: 2024-06-26 | Stop reason: HOSPADM

## 2024-06-26 RX ORDER — SODIUM CHLORIDE 9 MG/ML
INJECTION, SOLUTION INTRAVENOUS
OUTPATIENT
Start: 2024-06-26

## 2024-06-26 RX ORDER — POTASSIUM CHLORIDE 20 MEQ/1
40 TABLET, EXTENDED RELEASE ORAL ONCE
Status: COMPLETED | OUTPATIENT
Start: 2024-06-26 | End: 2024-06-26

## 2024-06-26 RX ORDER — HEPARIN SODIUM 1000 [USP'U]/ML
INJECTION, SOLUTION INTRAVENOUS; SUBCUTANEOUS
Status: DISCONTINUED | OUTPATIENT
Start: 2024-06-26 | End: 2024-06-26 | Stop reason: HOSPADM

## 2024-06-26 RX ORDER — FUROSEMIDE 10 MG/ML
INJECTION INTRAMUSCULAR; INTRAVENOUS
Status: DISCONTINUED | OUTPATIENT
Start: 2024-06-26 | End: 2024-06-26 | Stop reason: HOSPADM

## 2024-06-26 RX ORDER — VERAPAMIL HYDROCHLORIDE 2.5 MG/ML
INJECTION, SOLUTION INTRAVENOUS
Status: DISCONTINUED | OUTPATIENT
Start: 2024-06-26 | End: 2024-06-26 | Stop reason: HOSPADM

## 2024-06-26 RX ADMIN — RANOLAZINE 500 MG: 500 TABLET, FILM COATED, EXTENDED RELEASE ORAL at 09:06

## 2024-06-26 RX ADMIN — CLOPIDOGREL BISULFATE 75 MG: 75 TABLET ORAL at 10:06

## 2024-06-26 RX ADMIN — POTASSIUM CHLORIDE 40 MEQ: 1500 TABLET, EXTENDED RELEASE ORAL at 10:06

## 2024-06-26 RX ADMIN — ASPIRIN 81 MG CHEWABLE TABLET 81 MG: 81 TABLET CHEWABLE at 10:06

## 2024-06-26 RX ADMIN — CARVEDILOL 25 MG: 25 TABLET, FILM COATED ORAL at 09:06

## 2024-06-26 RX ADMIN — RANOLAZINE 500 MG: 500 TABLET, FILM COATED, EXTENDED RELEASE ORAL at 10:06

## 2024-06-26 RX ADMIN — ATORVASTATIN CALCIUM 80 MG: 40 TABLET, FILM COATED ORAL at 10:06

## 2024-06-26 RX ADMIN — LEVOTHYROXINE SODIUM 75 MCG: 25 TABLET ORAL at 05:06

## 2024-06-26 RX ADMIN — HYDRALAZINE HYDROCHLORIDE 50 MG: 25 TABLET, FILM COATED ORAL at 05:06

## 2024-06-26 RX ADMIN — HYDRALAZINE HYDROCHLORIDE 50 MG: 25 TABLET, FILM COATED ORAL at 09:06

## 2024-06-26 RX ADMIN — HYDROXYZINE PAMOATE 25 MG: 25 CAPSULE ORAL at 09:06

## 2024-06-26 RX ADMIN — CARVEDILOL 25 MG: 25 TABLET, FILM COATED ORAL at 10:06

## 2024-06-26 RX ADMIN — ISOSORBIDE MONONITRATE 30 MG: 30 TABLET, EXTENDED RELEASE ORAL at 10:06

## 2024-06-26 RX ADMIN — SPIRONOLACTONE 25 MG: 25 TABLET ORAL at 10:06

## 2024-06-26 RX ADMIN — AMLODIPINE BESYLATE 5 MG: 5 TABLET ORAL at 10:06

## 2024-06-26 NOTE — PLAN OF CARE
Problem: Acute Coronary Syndrome  Goal: Optimal Adaptation to Illness  Outcome: Progressing  Intervention: Support Adjustment to Life-Change Event  Flowsheets (Taken 6/26/2024 1058)  Supportive Measures: active listening utilized  Family/Support System Care: involvement promoted

## 2024-06-26 NOTE — PLAN OF CARE
SOCIAL WORK DISCHARGE PLANNING ASSESSMENT    SW completed discharge planning assessment. Pt lives with pt's wife Karmen ([phone]). Pt has good support from family and friends and advised Karmen will provide assistance as needed after returning home. Pt has no DME and is not current with  services. Pt receives HD every [facility]. Pt drives himself to doctor appointments and Karmen will provide transportation home following discharge. No needs for community resources were reported. SW will continue to follow.      Future Appointments   Date Time Provider Department Center   7/1/2024  3:00 PM [facility]   12/16/2024  1:40 PM Perez Bell DO [facility]      Patient Active Problem List   Diagnosis    HTN (hypertension)    Claudication in peripheral vascular disease    DJD (degenerative joint disease), lumbar    Hyperlipidemia    Atherosclerosis of native artery of both lower extremities with intermittent claudication    Venous insufficiency of both lower extremities    Abnormal cardiovascular stress test    Coronary artery disease involving native coronary artery of native heart with angina pectoris with documented spasm    Bilateral carotid artery stenosis    Cardiac arrest    Nephrotic range proteinuria    Nuclear sclerosis, bilateral    COPD (chronic obstructive pulmonary disease)    Anemia due to chronic kidney disease, on chronic dialysis    Persistent atrial fibrillation    Hypothyroidism    Unstable angina    Paroxysmal atrial fibrillation    EBV infection    CMV (cytomegalovirus infection)    Closed fracture of transverse process of lumbar vertebra    ESRD (end stage renal disease) on dialysis    Fall    Membranous nephropathy determined by biopsy    S/P arteriovenous (AV) graft placement    Hemodialysis catheter dysfunction    NSTEMI (non-ST elevated myocardial infarction)      06/26/24 0823   Discharge Assessment    Assessment Type Discharge Planning Assessment   Confirmed/corrected address, phone number and insurance Yes   Confirmed Demographics Correct on Facesheet   Source of Information health record   Communicated FLACO with patient/caregiver Date not available/Unable to determine   Reason For Admission NSTEMI   People in Home spouse   Facility Arrived From: home   Do you expect to return to your current living situation? Yes   Do you have help at home or someone to help you manage your care at home? Yes   Who are your caregiver(s) and their phone number(s)? pt's wife Karmen [phone]   Prior to hospitilization cognitive status: Alert/Oriented   Current cognitive status: Alert/Oriented   Walking or Climbing Stairs Difficulty no   Dressing/Bathing Difficulty no   Home Accessibility wheelchair accessible   Home Layout Able to live on 1st floor   Equipment Currently Used at Home none   Readmission within 30 days? No   Patient currently being followed by outpatient case management? No   Do you currently have service(s) that help you manage your care at home? No   Do you take prescription medications? Yes   Do you have prescription coverage? Yes   Coverage [employer] Shield   Do you have any problems affording any of your prescribed medications? No   Is the patient taking medications as prescribed? yes   Who is going to help you get home at discharge? pt's wife Karmen [phone]   How do you get to doctors appointments? car, drives self;family or friend will provide   Are you on dialysis? Yes   Dialysis Name and Scheduled days [facility] in [place] on Tuesday, Thursday, Saturday   Do you take coumadin? No   Discharge Plan A Home with family   Discharge Plan B Home Health   DME Needed Upon Discharge    (TBD)   Discharge Plan discussed with: Patient   Transition of Care Barriers None   Physical Activity   On average, how many days per week do you engage in moderate to strenuous exercise (like a brisk walk)? 0 days   On  average, how many minutes do you engage in exercise at this level? 0 min   Financial Resource Strain   How hard is it for you to pay for the very basics like food, housing, medical care, and heating? Not hard   Housing Stability   In the last 12 months, was there a time when you were not able to pay the mortgage or rent on time? N   At any time in the past 12 months, were you homeless or living in a shelter (including now)? N   Transportation Needs   Has the lack of transportation kept you from medical appointments, meetings, work or from getting things needed for daily living? No   Food Insecurity   Within the past 12 months, you worried that your food would run out before you got the money to buy more. Never true   Within the past 12 months, the food you bought just didn't last and you didn't have money to get more. Never true   Stress   Do you feel stress - tense, restless, nervous, or anxious, or unable to sleep at night because your mind is troubled all the time - these days? Not at all   Social Isolation   How often do you feel lonely or isolated from those around you?  Rarely   Alcohol Use   Q1: How often do you have a drink containing alcohol? Never   Q2: How many drinks containing alcohol do you have on a typical day when you are drinking? None   Q3: How often do you have six or more drinks on one occasion? Never   Utilities   In the past 12 months has the electric, gas, oil, or water company threatened to shut off services in your home? No   Health Literacy   How often do you need to have someone help you when you read instructions, pamphlets, or other written material from your doctor or pharmacy? Never   OTHER   Name(s) of People in Home pt's wife Karmen [phone]

## 2024-06-26 NOTE — PLAN OF CARE
Patient transferred to recovery cath lab slot 3 via stretcher with side rails up x2 .  Pt AAO X4 and able to follow commands. Pt is stable when connecting to cardiac monitors. VSS. Right radial vasc band in place with 15 ml of air in band c.d.i. no bleeding or hematoma noted. Sheath in place to left groin with pressure bag intact. +2 mag radial pulses palpated. Palpated mag pedal and post tib pulses.Skin normal in color and warm to touch, <3 sec cap refill.  Fall risk precautions given and patient acknowledges.  AIDET completed to pt.  Will continue to monitor patient.  Updated pt's spouse via telephone.   No

## 2024-06-26 NOTE — ASSESSMENT & PLAN NOTE
- known hx of CAD presents with chest pain, elevated troponin  - EKG with LBBB, TWI V5-V6 (LBBB old, twi intermittently present on review of past EKG)  - aspirin loaded  - continue aspirin, plavix  - fully anticoagulated with apixiban. Will hold apixiban, transition to heparin gtt when next dose of apixiban is due  - continue home betablocker  - on multiple anti-anginals (amlo, carvedilol, isosorbide mononitrate, ranexa), Continue during admission  - TTE  - Cardiology to perform LHC today

## 2024-06-26 NOTE — PLAN OF CARE
Report given to NICOLAS Zepeda.  All questions answered at bedside.  Pt resting quietly with NAD noted.  VSS.

## 2024-06-26 NOTE — PLAN OF CARE
Problem: Adult Inpatient Plan of Care  Goal: Plan of Care Review  Outcome: Progressing  Goal: Patient-Specific Goal (Individualized)  Outcome: Progressing  Goal: Optimal Comfort and Wellbeing  Outcome: Progressing  Goal: Readiness for Transition of Care  Outcome: Progressing     Problem: Acute Coronary Syndrome  Goal: Absence of Cardiac-Related Pain  Outcome: Progressing  Goal: Normalized Cardiac Rhythm  Outcome: Progressing  Goal: Effective Cardiac Pump Function  Outcome: Progressing  Goal: Adequate Tissue Perfusion  Outcome: Progressing     Problem: Cardiac Catheterization (Diagnostic/Interventional)  Goal: Absence of Bleeding  Outcome: Progressing  Goal: Stable Heart Rate and Rhythm  Outcome: Progressing  Goal: Absence of Embolism Signs and Symptoms  Outcome: Progressing  Goal: Optimal Pain Control and Function  Outcome: Progressing  Goal: Absence of Vascular Access Complication  Outcome: Progressing     Problem: Wound  Goal: Optimal Functional Ability  Outcome: Progressing  Goal: Improved Oral Intake  Outcome: Progressing  Goal: Skin Health and Integrity  Outcome: Progressing  Goal: Optimal Wound Healing  Outcome: Progressing     Problem: Fall Injury Risk  Goal: Absence of Fall and Fall-Related Injury  Outcome: Progressing

## 2024-06-26 NOTE — PROGRESS NOTES
Progress Note  Nephrology      Consult Requested By: No att. providers found      SUBJECTIVE:     Overnight events  Patient is a 64 y.o. male     Patient Active Problem List   Diagnosis    HTN (hypertension)    Claudication in peripheral vascular disease    DJD (degenerative joint disease), lumbar    Hyperlipidemia    Atherosclerosis of native artery of both lower extremities with intermittent claudication    Venous insufficiency of both lower extremities    Abnormal cardiovascular stress test    Coronary artery disease involving native coronary artery of native heart with angina pectoris with documented spasm    Bilateral carotid artery stenosis    Cardiac arrest    Nephrotic range proteinuria    Nuclear sclerosis, bilateral    COPD (chronic obstructive pulmonary disease)    Anemia due to chronic kidney disease, on chronic dialysis    Persistent atrial fibrillation    Hypothyroidism    Unstable angina    Paroxysmal atrial fibrillation    EBV infection    CMV (cytomegalovirus infection)    Closed fracture of transverse process of lumbar vertebra    ESRD (end stage renal disease) on dialysis    Fall    Membranous nephropathy determined by biopsy    S/P arteriovenous (AV) graft placement    Hemodialysis catheter dysfunction    NSTEMI (non-ST elevated myocardial infarction)     Past Medical History:   Diagnosis Date    Allergy     Anemia     Anticoagulant long-term use     Arthritis     CKD (chronic kidney disease) stage 4, GFR 15-29 ml/min     COPD (chronic obstructive pulmonary disease) 08/20/2021    Coronary artery disease     Heart attack 10/04/2019    Hematuria     Hemothorax     Hyperlipidemia     Hypertension     Hyperuricemia     Hypocalcemia     Hypokalemia     Hypophosphatemia     Hypothyroidism 3/2/2023    PAD (peripheral artery disease)     Proteinuria     Vitamin D deficiency               OBJECTIVE:     Vitals:    06/27/24 0730 06/27/24 0800 06/27/24 0825 06/27/24 0831   BP: (!) 109/59 (!) 107/59 117/62  132/70   BP Location:       Patient Position:       Pulse: 66 67 72 71   Resp: 14 14 (!) 29 (!) 24   Temp: 99.1 °F (37.3 °C)      TempSrc: Oral      SpO2: 97% 98% (!) 93% (!) 93%   Weight:       Height:           Temp: 99.1 °F (37.3 °C) (06/27/24 0730)  Pulse: 71 (06/27/24 0831)  Resp: (!) 24 (06/27/24 0831)  BP: 132/70 (06/27/24 0831)  SpO2: (!) 93 % (06/27/24 0831)              Medications:   amLODIPine  5 mg Oral Daily    aspirin  81 mg Oral Daily    atorvastatin  80 mg Oral Daily    carvediloL  25 mg Oral BID    clopidogreL  75 mg Oral Daily    hydrALAZINE  50 mg Oral Q8H    isosorbide mononitrate  30 mg Oral Daily    levothyroxine  75 mcg Oral Before breakfast    ranolazine  500 mg Oral BID    spironolactone  25 mg Oral Daily      heparin (porcine) in D5W  0-40 Units/kg/hr (Adjusted) Intravenous Continuous 10.7 mL/hr at 06/26/24 0446 14 Units/kg/hr at 06/26/24 0446    heparin (porcine)    Continuous  mL/hr at 06/26/24 1105 1,500 Units/hr at 06/26/24 1105               Physical Exam:  General appearance:NAD  Lungs: RR 24  Heart: Pulse 71  Abdomen: soft  Extremities: no edema  Skin: pale        Laboratory:  ABG  Labs reviewed  Recent Results (from the past 336 hour(s))   Basic Metabolic Panel    Collection Time: 06/26/24  3:19 AM   Result Value Ref Range    Sodium 138 136 - 145 mmol/L    Potassium 3.3 (L) 3.5 - 5.1 mmol/L    Chloride 104 95 - 110 mmol/L    CO2 20 (L) 23 - 29 mmol/L    BUN 41 (H) 8 - 23 mg/dL    Creatinine 4.7 (H) 0.5 - 1.4 mg/dL    Calcium 9.0 8.7 - 10.5 mg/dL    Anion Gap 14 8 - 16 mmol/L     Recent Results (from the past 336 hour(s))   CBC auto differential    Collection Time: 06/27/24  3:20 AM   Result Value Ref Range    WBC 4.90 3.90 - 12.70 K/uL    Hemoglobin 8.1 (L) 14.0 - 18.0 g/dL    Hematocrit 24.6 (L) 40.0 - 54.0 %    Platelets 159 150 - 450 K/uL   CBC auto differential    Collection Time: 06/26/24  3:20 AM   Result Value Ref Range    WBC 6.38 3.90 - 12.70 K/uL    Hemoglobin 9.1  "(L) 14.0 - 18.0 g/dL    Hematocrit 28.6 (L) 40.0 - 54.0 %    Platelets 152 150 - 450 K/uL   CBC auto differential    Collection Time: 06/25/24  7:44 AM   Result Value Ref Range    WBC 5.71 3.90 - 12.70 K/uL    Hemoglobin 8.1 (L) 14.0 - 18.0 g/dL    Hematocrit 24.9 (L) 40.0 - 54.0 %    Platelets 157 150 - 450 K/uL     Urinalysis  No results for input(s): "COLORU", "CLARITYU", "SPECGRAV", "PHUR", "PROTEINUA", "GLUCOSEU", "BILIRUBINCON", "BLOODU", "WBCU", "RBCU", "BACTERIA", "MUCUS", "NITRITE", "LEUKOCYTESUR", "UROBILINOGEN", "HYALINECASTS" in the last 24 hours.    Diagnostic Results:  X-Ray: Reviewed  US: Reviewed  Echo: Reviewed  ACCESS    ASSESSMENT/PLAN:   Patient seen in recovery.  NSTEMI  Paroxysmal atrial fibrillation   Coronary artery disease   Blood pressure 143/65, 132/70  S/p LHC   Successful angioplasty of LCX/OM   Patent stents in LAD and RCA     ESRD  Left arm AV graft  Metabolic bone disease  Poor nutrition  Albumin 3.1  Renal diet as tolerated  Dialysis tomorrow      "

## 2024-06-26 NOTE — NURSING
RAPID RESPONSE NURSE PROACTIVE ROUNDING NOTE         Admit Date: 2024  LOS: 1  Code Status: Full Code   Date of Visit: 2024  : 1960  Age: 64 y.o.  Sex: male  Race: White  Bed: K477/K477 A:   MRN: [id number]  Was the patient discharged from an ICU this admission? No   Was the patient discharged from a PACU within last 24 hours? No   Did the patient receive conscious sedation/general anesthesia in last 24 hours? No   Was the patient in the ED within the past 24 hours? Yes   Was the patient on NIPPV within the past 24 hours? No   Attending Physician: Yolanda Zambrano MD  Primary Service: Internal Medicine,Hospitalist     SITUATION    Notified by vital sign review   Reason for alert: SpO2 89% on RA    Diagnosis: NSTEMI (non-ST elevated myocardial infarction)   has a past medical history of Allergy, Anemia, Anticoagulant long-term use, Arthritis, CKD (chronic kidney disease) stage 4, GFR 15-29 ml/min, COPD (chronic obstructive pulmonary disease), Coronary artery disease, Heart attack, Hematuria, Hemothorax, Hyperlipidemia, Hypertension, Hyperuricemia, Hypocalcemia, Hypokalemia, Hypophosphatemia, Hypothyroidism, PAD (peripheral artery disease), Proteinuria, and Vitamin D deficiency.    Last Vitals:  Temp: 98.4 °F (36.9 °C) ( 0002)  Pulse: 77 ( 0021)  Resp: 20 ( 0002)  BP: 128/63 ( 0002)  SpO2: 89 % (2)    24 Hour Vitals Range:  Temp:  [97.6 °F (36.4 °C)-98.4 °F (36.9 °C)]   Pulse:  [56-77]   Resp:  [16-20]   BP: (123-146)/(58-67)   SpO2:  [89 %-98 %]     Clinical Issues: Respiratory    ASSESSMENT/INTERVENTIONS    VS review SpO2 89%  Notified bedside NICOLASA Dickerson    RECOMMENDATIONS  Discuss with MD for SpO2 goal parameters and/or continuous O2 orders.     Discussed plan of care with bedside NICOLASA Dickerson    PROVIDER ESCALATION    Physician escalation: Yes      Disposition:Remain in room 477    FOLLOW UP    Call back the Rapid Response NurseEleni RN at [phone] for  additional questions or concerns.

## 2024-06-26 NOTE — SUBJECTIVE & OBJECTIVE
Interval History: No further episodes of CP or pressure,     Review of Systems   Constitutional:  Negative for activity change, appetite change and chills.   HENT: Negative.  Negative for congestion.    Eyes:  Negative for photophobia and visual disturbance.   Respiratory:  Negative for apnea, chest tightness and shortness of breath.    Cardiovascular:  Negative for chest pain, palpitations and leg swelling.   Gastrointestinal:  Negative for abdominal distention, abdominal pain and diarrhea.   Endocrine: Negative for cold intolerance and heat intolerance.   Musculoskeletal:  Negative for joint swelling.   Skin:  Negative for color change and pallor.   Neurological:  Negative for dizziness and headaches.   Psychiatric/Behavioral:  Negative for agitation and behavioral problems.      Objective:     Vital Signs (Most Recent):  Temp: 98.8 °F (37.1 °C) (06/26/24 0726)  Pulse: 73 (06/26/24 0742)  Resp: 18 (06/26/24 0726)  BP: 120/60 (06/26/24 0726)  SpO2: 95 % (06/26/24 0742) Vital Signs (24h Range):  Temp:  [97.6 °F (36.4 °C)-98.8 °F (37.1 °C)] 98.8 °F (37.1 °C)  Pulse:  [60-77] 73  Resp:  [16-20] 18  SpO2:  [89 %-98 %] 95 %  BP: (120-141)/(60-63) 120/60     Weight: 83.7 kg (184 lb 8.4 oz)  Body mass index is 26.48 kg/m².    Intake/Output Summary (Last 24 hours) at 6/26/2024 1305  Last data filed at 6/26/2024 1000  Gross per 24 hour   Intake 780 ml   Output 2490 ml   Net -1710 ml         Physical Exam  Constitutional:       Appearance: Normal appearance.   HENT:      Head: Normocephalic and atraumatic.   Cardiovascular:      Rate and Rhythm: Normal rate and regular rhythm.   Pulmonary:      Effort: No respiratory distress.      Breath sounds: No stridor.   Abdominal:      General: Bowel sounds are normal. There is no distension.   Musculoskeletal:         General: No swelling or deformity.      Right lower leg: No edema.      Left lower leg: No edema.   Skin:     General: Skin is warm and dry.   Neurological:       General: No focal deficit present.      Mental Status: He is alert and oriented to person, place, and time. Mental status is at baseline.      Cranial Nerves: No cranial nerve deficit.   Psychiatric:         Mood and Affect: Mood normal.         Behavior: Behavior normal.             Significant Labs: All pertinent labs within the past 24 hours have been reviewed.  CBC:   Recent Labs   Lab 06/25/24  0038 06/25/24  0744 06/26/24  0320   WBC 6.58 5.71 6.38   HGB 8.5* 8.1* 9.1*   HCT 25.7* 24.9* 28.6*    157 152     CMP:   Recent Labs   Lab 06/25/24  0038 06/26/24  0319    138   K 3.0* 3.3*    104   CO2 22* 20*   GLU 96 88   BUN 35* 41*   CREATININE 4.6* 4.7*   CALCIUM 8.8 9.0   PROT 6.9  --    ALBUMIN 3.1* 2.8*   BILITOT 0.5  --    ALKPHOS 64  --    AST 17  --    ALT 8*  --    ANIONGAP 14 14       Significant Imaging: I have reviewed all pertinent imaging results/findings within the past 24 hours.

## 2024-06-26 NOTE — PLAN OF CARE
VN Note: Plan of care review, labs, notes, orders, will be available as needed.   Problem: Adult Inpatient Plan of Care  Goal: Plan of Care Review  Outcome: Progressing

## 2024-06-26 NOTE — NURSING
Arrives to ICU on room air, VSS. SR. L groin sheath in place, gauze/tegaderm intact, pressure bag in place. R radial wrist +2 pulses, gauze/tegaderm intact.      on time of arrival - needs to be <180 for removal. Check q1h.     Patient AAOx4, no pain. Report to be given to oncoming nurse Rodrigue

## 2024-06-26 NOTE — BRIEF OP NOTE
[place] - Cath Lab ([facility])  Surgery Department  Operative Note    SUMMARY   POST CATH NOTE    Date of Procedure: 6/26/2024     Procedure: Procedure(s) (LRB):  Left heart cath (Left)  Percutaneous coronary intervention (N/A)  PTCA, Single Vessel     Surgeons and Role:     * Enoch Tirado MD - Primary    Assisting Surgeon: None    Pre-Operative Diagnosis: NSTEMI (non-ST elevation myocardial infarction) [I21.4]    Post-Operative Diagnosis: Post-Op Diagnosis Codes:     * NSTEMI (non-ST elevation myocardial infarction) [I21.4]    s/p catheterization secondary to: NSTEMI    Cath Results:  Access: Us guided RRA and RCFA  LM: medium caliber, YADIRA III flow  LAD: Medium caliber, stent patents, YADIRA III flow  LCx: ostium to prox stent is completely occluded, OM stent is completely occluded with microchannels. Right to left collaterals appreciated.   YADIRA III flow  RCA: medium caliber, ,stents patents with mild ISR, distal RCA and PDA with 70-80% stenosis but are small vessels <2.0mm. YADIRA III flow  LVgram: LVEDP 23    Intervention:     PCI procedure:  Heparin administered. ACT was closely monitored.  Using a EBU 3.5 guider, this was used to cannulate the left system. Multiple wires used throughout the case. Advanced juno blue to OM,  50 to Cx, runthrough to LAD. First we balloon the ost-prox cx with 2.0 mm, 2.5 mm, 2.5 mm angiosculpt, 3.0 mm NC. After multiple wire I was able to advanced  50 to Cx and repetition of the same balloon order was used to predilated. After, with two new 2.5 mm NC balloon with did KBI. With acceptable results. After the balloon was removed.  The wire was left in place.  Repeat angiography showed no signs of dissection.  Subsequently the wire was pulled back.   Final angiography showed YADIRA-3 flow.  No signs of dissection, perforation, or thrombus.     Closure device: vasc band and manual pressure  Patient tolerated procedure well, no complications    Post Cath Exam:  /61 (BP  "Location: Left arm, Patient Position: Lying)   Pulse 65   Temp 98.8 °F (37.1 °C) (Oral)   Resp 14   Ht 5' 10" (1.778 m)   Wt 83.7 kg (184 lb 8.4 oz)   SpO2 (!) 91%   BMI 26.48 kg/m²     Anesthesia: RN IV Sedation      Estimated Blood Loss (EBL): * No values recorded between 6/26/2024 11:24 AM and 6/26/2024  2:38 PM *           Specimens:   Specimen (24h ago, onward)      None               Assessment:   Successful angioplasty of LCX/OM with acceptable results - regaining YADIRA III flow  Patent stents in LAD and RCA      Plan:   DAPT  GDMT and intense statin therapy +/- repatha  PET stress test as an outpatient  F/u cardiology      "

## 2024-06-26 NOTE — PROGRESS NOTES
[facility] ([facility])  [facility] Medicine  Progress Note    Patient Name: Domingo Gross  MRN: [id number]  Patient Class: IP- Inpatient   Admission Date: 6/25/2024  Length of Stay: 1 days  Attending Physician: Yolanda Zambrano MD  Primary Care Provider: Perez Bell DO        Subjective:     Principal Problem:NSTEMI (non-ST elevated myocardial infarction)        HPI:  Domingo Gross is a 64 y.o. year old male with a history of CAD s/p PCI, PVD, ESRD on dialysis, hypertension, who presents with a chief complaint of chest pain. 3 hours prior to arrival to the ED he developed 4/10, non-radiating substernal chest pain that he described as an elephant sitting on his chest. He reports having multiple similar episodes (although less severe) during the week. He follows with Dr. Salazar with cardiology. Last stress test was a SPECT 3/2024 and was negative. Currently chest pain free since receiving nitroglycerin in the ED.     Overview/Hospital Course:  6/26 [facility] today then HD    Interval History: No further episodes of CP or pressure,     Review of Systems   Constitutional:  Negative for activity change, appetite change and chills.   HENT: Negative.  Negative for congestion.    Eyes:  Negative for photophobia and visual disturbance.   Respiratory:  Negative for apnea, chest tightness and shortness of breath.    Cardiovascular:  Negative for chest pain, palpitations and leg swelling.   Gastrointestinal:  Negative for abdominal distention, abdominal pain and diarrhea.   Endocrine: Negative for cold intolerance and heat intolerance.   Musculoskeletal:  Negative for joint swelling.   Skin:  Negative for color change and pallor.   Neurological:  Negative for dizziness and headaches.   Psychiatric/Behavioral:  Negative for agitation and behavioral problems.      Objective:     Vital Signs (Most Recent):  Temp: 98.8 °F (37.1 °C) (06/26/24 0726)  Pulse: 73 (06/26/24 0742)  Resp: 18 (06/26/24 0726)  BP: 120/60  (06/26/24 0726)  SpO2: 95 % (06/26/24 0742) Vital Signs (24h Range):  Temp:  [97.6 °F (36.4 °C)-98.8 °F (37.1 °C)] 98.8 °F (37.1 °C)  Pulse:  [60-77] 73  Resp:  [16-20] 18  SpO2:  [89 %-98 %] 95 %  BP: (120-141)/(60-63) 120/60     Weight: 83.7 kg (184 lb 8.4 oz)  Body mass index is 26.48 kg/m².    Intake/Output Summary (Last 24 hours) at 6/26/2024 1305  Last data filed at 6/26/2024 1000  Gross per 24 hour   Intake 780 ml   Output 2490 ml   Net -1710 ml         Physical Exam  Constitutional:       Appearance: Normal appearance.   HENT:      Head: Normocephalic and atraumatic.   Cardiovascular:      Rate and Rhythm: Normal rate and regular rhythm.   Pulmonary:      Effort: No respiratory distress.      Breath sounds: No stridor.   Abdominal:      General: Bowel sounds are normal. There is no distension.   Musculoskeletal:         General: No swelling or deformity.      Right lower leg: No edema.      Left lower leg: No edema.   Skin:     General: Skin is warm and dry.   Neurological:      General: No focal deficit present.      Mental Status: He is alert and oriented to person, place, and time. Mental status is at baseline.      Cranial Nerves: No cranial nerve deficit.   Psychiatric:         Mood and Affect: Mood normal.         Behavior: Behavior normal.             Significant Labs: All pertinent labs within the past 24 hours have been reviewed.  CBC:   Recent Labs   Lab 06/25/24  0038 06/25/24  0744 06/26/24  0320   WBC 6.58 5.71 6.38   HGB 8.5* 8.1* 9.1*   HCT 25.7* 24.9* 28.6*    157 152     CMP:   Recent Labs   Lab 06/25/24  0038 06/26/24  0319    138   K 3.0* 3.3*    104   CO2 22* 20*   GLU 96 88   BUN 35* 41*   CREATININE 4.6* 4.7*   CALCIUM 8.8 9.0   PROT 6.9  --    ALBUMIN 3.1* 2.8*   BILITOT 0.5  --    ALKPHOS 64  --    AST 17  --    ALT 8*  --    ANIONGAP 14 14       Significant Imaging: I have reviewed all pertinent imaging results/findings within the past 24 hours.    Assessment/Plan:       * NSTEMI (non-ST elevated myocardial infarction)  - known hx of CAD presents with chest pain, elevated troponin  - EKG with LBBB, TWI V5-V6 (LBBB old, twi intermittently present on review of past EKG)  - aspirin loaded  - continue aspirin, plavix  - fully anticoagulated with apixiban. Will hold apixiban, transition to heparin gtt when next dose of apixiban is due  - continue home betablocker  - on multiple anti-anginals (amlo, carvedilol, isosorbide mononitrate, ranexa), Continue during admission  - TTE  - Cardiology to perform LHC today      ESRD (end stage renal disease) on dialysis  - appears euvolemic  - renal consult placed for inaptient dialysis management.    Paroxysmal atrial fibrillation  Currently in nsr  On apixiban at home, held due to potential angiogram    Hypothyroidism  Continue home levothyroxine      COPD (chronic obstructive pulmonary disease)  - No wheezing  - appears at baseline    Coronary artery disease involving native coronary artery of native heart with angina pectoris with documented spasm  - known CAD with prior stents (mid LAD/prox RCA PCI 3/2019, prox LCA in 10/2019)  - management as above for NSTEMI    Hyperlipidemia  - continue statin  - rosuvastatin changed to formulary alternative (atorvastatin).       HTN (hypertension)  - stable, continue home medications      VTE Risk Mitigation (From admission, onward)           Ordered     heparin (porcine) injection  As needed (PRN)         06/26/24 1136     heparin infusion 1,000 units/500 ml in 0.9% NaCl (pressure line flush)  Intra-op continuous PRN         06/26/24 1105     heparin 25,000 units in dextrose 5% (100 units/ml) IV bolus from bag LOW INTENSITY nomogram - [facility]  As needed (PRN)        Question:  Heparin Infusion Adjustment (DO NOT MODIFY ANSWER)  Answer:  [email or web address]\Images\Pharmacy\HeparinInfusions\heparin LOW INTENSITY nomogram for [facility] [id number]    06/25/24 0658     heparin 25,000 units in dextrose 5% (100 units/ml)  IV bolus from bag LOW INTENSITY nomogram - [facility]  As needed (PRN)        Question:  Heparin Infusion Adjustment (DO NOT MODIFY ANSWER)  Answer:  [email or web address]\epic\Images\Pharmacy\HeparinInfusions\heparin LOW INTENSITY nomogram for [facility] [id number]    06/25/24 0658     heparin 25,000 units in dextrose 5% 250 mL (100 units/mL) infusion LOW INTENSITY nomogram - [facility]  Continuous        Question:  Begin at (units/kg/hr)  Answer:  12    06/25/24 0658     IP VTE HIGH RISK PATIENT  Once         06/25/24 0233     Place sequential compression device  Until discontinued         06/25/24 0233                    Discharge Planning   FLACO:      Code Status: Full Code   Is the patient medically ready for discharge?:     Reason for patient still in hospital (select all that apply): Patient trending condition and Consult recommendations  Discharge Plan A: Home with family                  Yolanda Zambrano MD  Department of Hospital Medicine   [facility] ([facility])

## 2024-06-27 VITALS
SYSTOLIC BLOOD PRESSURE: 132 MMHG | DIASTOLIC BLOOD PRESSURE: 70 MMHG | HEART RATE: 71 BPM | HEIGHT: 70 IN | TEMPERATURE: 99 F | WEIGHT: 184.5 LBS | RESPIRATION RATE: 24 BRPM | BODY MASS INDEX: 26.41 KG/M2 | OXYGEN SATURATION: 93 %

## 2024-06-27 LAB
APTT PPP: 35.9 SEC (ref 21–32)
BASOPHILS # BLD AUTO: 0.02 K/UL (ref 0–0.2)
BASOPHILS NFR BLD: 0.4 % (ref 0–1.9)
DIFFERENTIAL METHOD BLD: ABNORMAL
EOSINOPHIL # BLD AUTO: 0.1 K/UL (ref 0–0.5)
EOSINOPHIL NFR BLD: 2.9 % (ref 0–8)
ERYTHROCYTE [DISTWIDTH] IN BLOOD BY AUTOMATED COUNT: 15.2 % (ref 11.5–14.5)
HCT VFR BLD AUTO: 24.6 % (ref 40–54)
HGB BLD-MCNC: 8.1 G/DL (ref 14–18)
IMM GRANULOCYTES # BLD AUTO: 0 K/UL (ref 0–0.04)
IMM GRANULOCYTES NFR BLD AUTO: 0 % (ref 0–0.5)
LYMPHOCYTES # BLD AUTO: 0.8 K/UL (ref 1–4.8)
LYMPHOCYTES NFR BLD: 15.3 % (ref 18–48)
MCH RBC QN AUTO: 28 PG (ref 27–31)
MCHC RBC AUTO-ENTMCNC: 32.9 G/DL (ref 32–36)
MCV RBC AUTO: 85 FL (ref 82–98)
MONOCYTES # BLD AUTO: 0.7 K/UL (ref 0.3–1)
MONOCYTES NFR BLD: 13.7 % (ref 4–15)
NEUTROPHILS # BLD AUTO: 3.3 K/UL (ref 1.8–7.7)
NEUTROPHILS NFR BLD: 67.7 % (ref 38–73)
NRBC BLD-RTO: 0 /100 WBC
OHS QRS DURATION: 148 MS
OHS QRS DURATION: 158 MS
OHS QRS DURATION: 162 MS
OHS QTC CALCULATION: 547 MS
OHS QTC CALCULATION: 562 MS
OHS QTC CALCULATION: 588 MS
PLATELET # BLD AUTO: 159 K/UL (ref 150–450)
PMV BLD AUTO: 10.4 FL (ref 9.2–12.9)
POC ACTIVATED CLOTTING TIME K: 183 SEC (ref 74–137)
POC ACTIVATED CLOTTING TIME K: 195 SEC (ref 74–137)
RBC # BLD AUTO: 2.89 M/UL (ref 4.6–6.2)
SAMPLE: ABNORMAL
SAMPLE: ABNORMAL
WBC # BLD AUTO: 4.9 K/UL (ref 3.9–12.7)

## 2024-06-27 PROCEDURE — 85025 COMPLETE CBC W/AUTO DIFF WBC: CPT | Performed by: STUDENT IN AN ORGANIZED HEALTH CARE EDUCATION/TRAINING PROGRAM

## 2024-06-27 PROCEDURE — 36415 COLL VENOUS BLD VENIPUNCTURE: CPT | Performed by: INTERNAL MEDICINE

## 2024-06-27 PROCEDURE — 27000221 HC OXYGEN, UP TO 24 HOURS

## 2024-06-27 PROCEDURE — 25000003 PHARM REV CODE 250: Performed by: STUDENT IN AN ORGANIZED HEALTH CARE EDUCATION/TRAINING PROGRAM

## 2024-06-27 PROCEDURE — 99900035 HC TECH TIME PER 15 MIN (STAT)

## 2024-06-27 PROCEDURE — 85730 THROMBOPLASTIN TIME PARTIAL: CPT | Performed by: INTERNAL MEDICINE

## 2024-06-27 PROCEDURE — 94761 N-INVAS EAR/PLS OXIMETRY MLT: CPT

## 2024-06-27 RX ORDER — NITROGLYCERIN 0.4 MG/1
0.4 TABLET SUBLINGUAL EVERY 5 MIN PRN
Qty: 25 TABLET | Refills: 4 | Status: SHIPPED | OUTPATIENT
Start: 2024-06-28 | End: 2025-06-28

## 2024-06-27 RX ADMIN — AMLODIPINE BESYLATE 5 MG: 5 TABLET ORAL at 08:06

## 2024-06-27 RX ADMIN — HYDRALAZINE HYDROCHLORIDE 50 MG: 25 TABLET, FILM COATED ORAL at 06:06

## 2024-06-27 RX ADMIN — ISOSORBIDE MONONITRATE 30 MG: 30 TABLET, EXTENDED RELEASE ORAL at 08:06

## 2024-06-27 RX ADMIN — LEVOTHYROXINE SODIUM 75 MCG: 25 TABLET ORAL at 06:06

## 2024-06-27 RX ADMIN — CARVEDILOL 25 MG: 25 TABLET, FILM COATED ORAL at 08:06

## 2024-06-27 RX ADMIN — SPIRONOLACTONE 25 MG: 25 TABLET ORAL at 08:06

## 2024-06-27 RX ADMIN — ASPIRIN 81 MG CHEWABLE TABLET 81 MG: 81 TABLET CHEWABLE at 08:06

## 2024-06-27 RX ADMIN — ATORVASTATIN CALCIUM 80 MG: 40 TABLET, FILM COATED ORAL at 08:06

## 2024-06-27 RX ADMIN — RANOLAZINE 500 MG: 500 TABLET, FILM COATED, EXTENDED RELEASE ORAL at 08:06

## 2024-06-27 RX ADMIN — CLOPIDOGREL BISULFATE 75 MG: 75 TABLET ORAL at 08:06

## 2024-06-27 NOTE — PROGRESS NOTES
The pt will d/c home with Miss Rosario via [facility] van at 10am because he wants to go to his own hd center at [facility]. The sw stressed the importance of the pt going to his hsp f/u's and taking his medications. The pt acknowledged understanding and states he will comply. The pt has no further questions or Case Management needs and is clear to d/c.    Future Appointments   Date Time Provider Department Center   7/1/2024  3:00 PM Adelaide Puga MD [facility] IMPRI [facility]   7/10/2024  1:20 PM Enoch Tirado MD [facility] CARDIO [facility]   12/16/2024  1:40 PM Perez Bell DO [facility] - Intensive Care  Discharge Final Note    Primary Care Provider: Perez Bell DO    Expected Discharge Date: 6/27/2024    Final Discharge Note (most recent)       Final Note - 06/27/24 0844          Final Note    What phone number can be called within the next 1-3 days to see how you are doing after discharge? [phone]                     Important Message from Medicare             Contact Info       Adelaide Puga MD   Specialty: Internal Medicine    [address]  [address]  [place] [address]   Phone: [phone]       Next Steps: Follow up on 7/1/2024    Instructions: 3:00pm [facility] F/U DX:Hyperlipidemia    [facility]    [address]  [place] [address]   Phone: [phone]       Next Steps: Follow up    Instructions: The pt will resume his care at his current dialysis center listed above with his chair time of(TTS at    Enoch Tirado MD   Specialty: Cardiology    [address]  [address]  [place] [address]   Phone: [phone]       Next Steps: Follow up on 7/10/2024    Instructions: 1:20pm [facility] F/U DX:Stemi

## 2024-06-27 NOTE — NURSING
Cath Lab Tech Kimberly at bedside to pull sheath from LEFT Groin.  States she held pressure for 20 minutes, site has small amount of bruising around exit site.  Charge RN reports no ACT value done at this time, per tech, sheath able to be removed once level is less than 200.  Value at 1935 was 183.  Neurovascular monitoring Q 1hrs. As ordered.

## 2024-06-27 NOTE — PLAN OF CARE
No acute events throughout shift. See vital signs and assessments for documentation. See below for updates.    Pulmonary:  2 LPM NC for SpO2 >/= 88%    Cardiac: NSR    Neurological: AAO X 4    Gastrointestinal: LBM 06/26/2024    Genitourinary: Urinal at bedside    Endocrine: Cardiac & Renal diet    Integumentary/other: LEFT Groin access site with gauze and tegaderm CDI, RIGHT wrist site with gauze and tegaderm CDI.    Gtts:  N/A    POC: Possible discharge home after receiving dialysis.      Mr. Gross and family updated on POC. Questions and concerns addressed. WCTM

## 2024-06-27 NOTE — PLAN OF CARE
Problem: Adult Inpatient Plan of Care  Goal: Plan of Care Review  Outcome: Met  Goal: Patient-Specific Goal (Individualized)  Outcome: Met  Goal: Absence of Hospital-Acquired Illness or Injury  Outcome: Met  Goal: Optimal Comfort and Wellbeing  Outcome: Met  Goal: Readiness for Transition of Care  Outcome: Met     Problem: Acute Coronary Syndrome  Goal: Optimal Adaptation to Illness  Outcome: Met  Goal: Absence of Cardiac-Related Pain  Outcome: Met  Goal: Normalized Cardiac Rhythm  Outcome: Met  Goal: Effective Cardiac Pump Function  Outcome: Met  Goal: Adequate Tissue Perfusion  Outcome: Met     Problem: Cardiac Catheterization (Diagnostic/Interventional)  Goal: Absence of Bleeding  Outcome: Met  Goal: Absence of Contrast-Induced Injury  Outcome: Met  Goal: Stable Heart Rate and Rhythm  Outcome: Met  Goal: Absence of Embolism Signs and Symptoms  Outcome: Met  Goal: Anesthesia/Sedation Recovery  Outcome: Met  Goal: Optimal Pain Control and Function  Outcome: Met  Goal: Absence of Vascular Access Complication  Outcome: Met     Problem: Infection  Goal: Absence of Infection Signs and Symptoms  Outcome: Met     Problem: Wound  Goal: Optimal Coping  Outcome: Met  Goal: Optimal Functional Ability  Outcome: Met  Goal: Absence of Infection Signs and Symptoms  Outcome: Met  Goal: Improved Oral Intake  Outcome: Met  Goal: Optimal Pain Control and Function  Outcome: Met  Goal: Skin Health and Integrity  Outcome: Met  Goal: Optimal Wound Healing  Outcome: Met     Problem: Fall Injury Risk  Goal: Absence of Fall and Fall-Related Injury  Outcome: Met     Problem: Hemodialysis  Goal: Safe, Effective Therapy Delivery  Outcome: Met  Goal: Effective Tissue Perfusion  Outcome: Met  Goal: Absence of Infection Signs and Symptoms  Outcome: Met     Problem: Chronic Kidney Disease  Goal: Electrolyte Balance  Outcome: Met  Goal: Minimize Renal Failure Effects  Outcome: Met     Problem: Comorbidity Management  Goal: Maintenance of COPD  Symptom Control  Outcome: Met  Goal: Blood Pressure in Desired Range  Outcome: Met     Problem: Skin Injury Risk Increased  Goal: Skin Health and Integrity  Outcome: Met

## 2024-06-27 NOTE — EICU
EICU BRIEF ADMIT NOTE:    HISTORY:  NonSTEMI, ESRD Please refer to H/P and ER notes for detail    CAMERA ASSESSMENT: Two way audiovisual assessment was done: Yes    Telemetry was reviewed. Medical records including notes, labs and imaging were reviewed.Yes    DISCUSSED with bedside nurse.No    ASSESSMENT AND PLAN:    # NonSTEMI: s/p Cath with PTCA to Circ and OM  # ESRD: Nephrology for HD    BEST PRACTICES REVIEW:    INTUBATED: NO  GLYCEMIN CONTROL:  Diabetes: No   STRESS ULCER PROPHYLAXIS: Not indicated   DVT PROPHYLAXIS:  Pharmacological    Thank You for allowing EICU to participate in the care of the patient. Please call as needed      Alejandro Woo MD  EICU  Critical Care Medicine

## 2024-06-27 NOTE — NURSING
Patient's sister, Ravi, updated over telephone with patient's permission.  Patient was speaking on his cellphone at the time of her call.  All questions answered.

## 2024-06-27 NOTE — PLAN OF CARE
Patient transferred to ICU on CR monitor with pulse ox.  VSS. No c/o pain.   Patient attached to CR monitor upon arrival in room 559.  Right radial with gauze/tegaderm CDI. No bleeding or hematoma noted. Left groin with sheath remains intact with pressure bag attached.  Dsg and site with no bleeding or hematoma noted.  ACT done with result of 195.  Site reviewed with ICU staff at bedside.  All questions answered.  Dr. Salazar notified of patient current ACT result and transfer of patient to ICU.  [facility] cardiology tonny notified via telephone of ACT result.  ICU staff to contact tech when able to remove sheath.

## 2024-06-27 NOTE — NURSING
Pt discharged with transporter. AVS printed and reviewed with pt. All questions answered. Disconnected from alarms and IVs removed.

## 2024-06-28 ENCOUNTER — PATIENT MESSAGE (OUTPATIENT)
Dept: ADMINISTRATIVE | Facility: CLINIC | Age: 64
End: 2024-06-28
Payer: COMMERCIAL

## 2024-06-28 ENCOUNTER — PATIENT OUTREACH (OUTPATIENT)
Dept: ADMINISTRATIVE | Facility: CLINIC | Age: 64
End: 2024-06-28
Payer: COMMERCIAL

## 2024-06-28 NOTE — DISCHARGE SUMMARY
[facility] Discharge Summary    Attending Physician: Kenya    Date of Admit: 6/25/2024  Date of Discharge: 6/27/2024    Discharge to: Home  Condition: Stable    Discharge Diagnoses     NSTEMI  CAD s/p multivessel PCI  ESRD on HD TThS  HTN  PAF  COPD    Consultants and Procedures     Consultants:  Nephrology, Cardiology    Procedures:   None    Brief History of Present Illness      Domingo Gross is a 64 y.o. year old male with a history of CAD s/p PCI, PVD, ESRD on dialysis, hypertension, who presents with a chief complaint of chest pain. 3 hours prior to arrival to the ED he developed 4/10, non-radiating substernal chest pain that he described as an elephant sitting on his chest. He reports having multiple similar episodes (although less severe) during the week. He follows with Dr. Salazar with cardiology. Last stress test was a SPECT 3/2024 and was negative. Currently chest pain free since receiving nitroglycerin in the ED.     For the full HPI please refer to the History & Physical from this admission.    Hospital Course By Problem with Pertinent Findings     NSTEMI (non-ST elevated myocardial infarction)  - known hx of CAD presents with chest pain, elevated troponin  - EKG with LBBB, TWI V5-V6 (LBBB old, twi intermittently present on review of past EKG)  - Cardiology to perform [facility] t6/26  Assessment:   1.Successful angioplasty of LCX/OM with acceptable results - regaining YADIRA III flow  2.Patent stents in LAD and RCA  Plan:   1.DAPT  2.GDMT and intense statin therapy +/- repatha  3.PET stress test as an outpatient, if ISR again will need brachytherapy  4.F/u cardiology     Clarified that patient will be on Plavix and Eliquis 5mg BID at discharge     ESRD (end stage renal disease) on dialysis  - appears euvolemic  - renal consult placed for inaptient dialysis management.     Paroxysmal atrial fibrillation  Currently in nsr  On apixiban at home, held due to potential angiogram    "  Hypothyroidism  Continue home levothyroxine     COPD (chronic obstructive pulmonary disease)  - No wheezing  - appears at baseline     Coronary artery disease involving native coronary artery of native heart with angina pectoris with documented spasm  - known CAD with prior stents (mid LAD/prox RCA PCI 3/2019, prox LCA in 10/2019)  - management as above for NSTEMI     Hyperlipidemia  - continue statin  - rosuvastatin changed to formulary alternative (atorvastatin).      HTN (hypertension)  - stable, continue home medications       Discharge Time: 32 minutes    /70   Pulse 71   Temp 99.1 °F (37.3 °C) (Oral)   Resp (!) 24   Ht 5' 10" (1.778 m)   Wt 83.7 kg (184 lb 8.4 oz)   SpO2 (!) 93%   BMI 26.48 kg/m²       Discharge Medications        Medication List        CHANGE how you take these medications      * nitroGLYCERIN 0.4 MG SL tablet  Commonly known as: NITROSTAT  Place 1 tablet (0.4 mg total) under the tongue every 5 (five) minutes as needed for Chest pain.  What changed: Another medication with the same name was added. Make sure you understand how and when to take each.     * nitroGLYCERIN 0.4 MG SL tablet  Commonly known as: NITROSTAT  Place 1 tablet (0.4 mg total) under the tongue every 5 (five) minutes as needed for Chest pain (for a max of 3 tabs in 15 minutes).  What changed: You were already taking a medication with the same name, and this prescription was added. Make sure you understand how and when to take each.     sodium bicarbonate 650 MG tablet  Take 1 tablet (650 mg total) by mouth once daily. for 30 doses  What changed: when to take this           * This list has 2 medication(s) that are the same as other medications prescribed for you. Read the directions carefully, and ask your doctor or other care provider to review them with you.                CONTINUE taking these medications      albuterol 90 mcg/actuation inhaler  Commonly known as: PROVENTIL/VENTOLIN HFA  INHALE 2 PUFFS INTO " THE LUNGS EVERY 6 HOURS AS NEEDED FOR WHEEZING     albuterol-ipratropium 2.5 mg-0.5 mg/3 mL nebulizer solution  Commonly known as: DUO-NEB  Take 3 mLs by nebulization every 6 (six) hours as needed for Wheezing or Shortness of Breath (or cough). Rescue     amLODIPine 5 MG tablet  Commonly known as: NORVASC  Take 1 tablet (5 mg total) by mouth once daily.     apixaban 5 mg Tab  Commonly known as: ELIQUIS  Take 1 tablet (5 mg total) by mouth 2 (two) times daily.     carvediloL 25 MG tablet  Commonly known as: COREG  Take 1 tablet (25 mg total) by mouth 2 (two) times daily with meals.     clopidogreL 75 mg tablet  Commonly known as: PLAVIX  Take 1 tablet (75 mg total) by mouth once daily.     coenzyme Q10 100 mg capsule     eplerenone 50 MG Tab  Commonly known as: INSPRA  Take 1 tablet (50 mg total) by mouth once daily.     hydrALAZINE 50 MG tablet  Commonly known as: APRESOLINE  Take 1 tablet (50 mg total) by mouth every 8 (eight) hours.     HYDROcodone-acetaminophen 5-325 mg per tablet  Commonly known as: NORCO  Take 1 tablet by mouth every 6 (six) hours as needed for Pain.     hydrOXYzine pamoate 25 MG Cap  Commonly known as: VISTARIL  TAKE 1 CAPSULE(25 MG) BY MOUTH EVERY NIGHT AS NEEDED FOR INSOMNIA OR ANXIETY     isosorbide mononitrate 30 MG 24 hr tablet  Commonly known as: IMDUR  Take 1 tablet (30 mg total) by mouth once daily.     levothyroxine 75 MCG tablet  Commonly known as: SYNTHROID  Take 1 tablet (75 mcg total) by mouth before breakfast.     LIDOcaine 5 % Oint ointment  Commonly known as: XYLOCAINE     ranolazine 500 MG Tb12  Commonly known as: RANEXA  Take 1 tablet (500 mg total) by mouth 2 (two) times daily.     rosuvastatin 40 MG Tab  Commonly known as: CRESTOR  Take 1 tablet (40 mg total) by mouth every evening.               Where to Get Your Medications        These medications were sent to [id number] - [place] - [address] AT [facility]  [address], [place] [address]      Phone: [phone]   nitroGLYCERIN 0.4 MG SL tablet         Discharge Information:   Diet:  Renal    Physical Activity:  As tolerated    Instructions:  1. Take all medications as prescribed  2. Keep all follow-up appointments  3. Return to the hospital or call your primary care physicians if any worsening symptoms such as dizziness, syncope, palpitations, SOB, chest pain or other concerns occur.      Follow-Up Appointments:  PCP in 1 week  Cardiology 1-2 weeks, July 10, 2024      Follow up items for PCP and tests that have not resulted at time of discharge:   None      Yolanda Zambrano MD  [facility] Medicine

## 2024-06-28 NOTE — PROGRESS NOTES
C3 nurse attempted to contact Domingo Gross for a TCC post hospital discharge follow up call. No answer. Left voicemail with callback information. The patient has a scheduled HOSFU appointment with Adelaide Puga on 07/01/2024 @ [id number].

## 2024-07-01 ENCOUNTER — OFFICE VISIT (OUTPATIENT)
Dept: PRIMARY CARE CLINIC | Facility: CLINIC | Age: 64
End: 2024-07-01
Payer: COMMERCIAL

## 2024-07-01 VITALS
OXYGEN SATURATION: 97 % | DIASTOLIC BLOOD PRESSURE: 54 MMHG | BODY MASS INDEX: 25.38 KG/M2 | SYSTOLIC BLOOD PRESSURE: 115 MMHG | HEIGHT: 70 IN | HEART RATE: 65 BPM | WEIGHT: 177.25 LBS

## 2024-07-01 DIAGNOSIS — Z79.01 ANTICOAGULATED: ICD-10-CM

## 2024-07-01 DIAGNOSIS — I21.4 NSTEMI (NON-ST ELEVATED MYOCARDIAL INFARCTION): Primary | ICD-10-CM

## 2024-07-01 DIAGNOSIS — Z99.2 ESRD (END STAGE RENAL DISEASE) ON DIALYSIS: ICD-10-CM

## 2024-07-01 DIAGNOSIS — N18.6 ESRD (END STAGE RENAL DISEASE) ON DIALYSIS: ICD-10-CM

## 2024-07-01 DIAGNOSIS — I48.0 PAROXYSMAL ATRIAL FIBRILLATION: ICD-10-CM

## 2024-07-01 PROCEDURE — 3078F DIAST BP <80 MM HG: CPT | Mod: CPTII,S$GLB,, | Performed by: INTERNAL MEDICINE

## 2024-07-01 PROCEDURE — 1111F DSCHRG MED/CURRENT MED MERGE: CPT | Mod: CPTII,S$GLB,, | Performed by: INTERNAL MEDICINE

## 2024-07-01 PROCEDURE — 99999 PR PBB SHADOW E&M-EST. PATIENT-LVL IV: CPT | Mod: PBBFAC,,, | Performed by: INTERNAL MEDICINE

## 2024-07-01 PROCEDURE — 3074F SYST BP LT 130 MM HG: CPT | Mod: CPTII,S$GLB,, | Performed by: INTERNAL MEDICINE

## 2024-07-01 PROCEDURE — 3062F POS MACROALBUMINURIA REV: CPT | Mod: CPTII,S$GLB,, | Performed by: INTERNAL MEDICINE

## 2024-07-01 PROCEDURE — 1159F MED LIST DOCD IN RCRD: CPT | Mod: CPTII,S$GLB,, | Performed by: INTERNAL MEDICINE

## 2024-07-01 PROCEDURE — 3066F NEPHROPATHY DOC TX: CPT | Mod: CPTII,S$GLB,, | Performed by: INTERNAL MEDICINE

## 2024-07-01 PROCEDURE — 99496 TRANSJ CARE MGMT HIGH F2F 7D: CPT | Mod: S$GLB,,, | Performed by: INTERNAL MEDICINE

## 2024-07-01 PROCEDURE — 3044F HG A1C LEVEL LT 7.0%: CPT | Mod: CPTII,S$GLB,, | Performed by: INTERNAL MEDICINE

## 2024-07-01 PROCEDURE — 1160F RVW MEDS BY RX/DR IN RCRD: CPT | Mod: CPTII,S$GLB,, | Performed by: INTERNAL MEDICINE

## 2024-07-01 NOTE — PROGRESS NOTES
[facility]   New Visit Progress Note   Recent Hospital Discharge     PRESENTING HISTORY     Chief Complaint/Reason for Admission:  Follow up Hospital Discharge   PCP: Perez Bell DO    History of Present Illness:  Mr. Domingo Gross is a 64 y.o. male who was recently admitted to the hospital.    [facility] Discharge Summary  Attending Physician: Kenya  Date of Admit: 6/25/2024  Date of Discharge: 6/27/2024  Discharge to: Home  Condition: Stable  ___________________________________________________________________    Today:  [facility] for [facility] follow up.  Recently hospitalized for management of NSTEMI.  Admitted to [facility] Medicine service with Cardiology consultation.  LHC performed 6/26/24-> balloon angioplasty performed to LCX/OM with good results. LAD and RCA stents patent.   GDMT therapy maximized.  PET stress planned as outpatient.   Patient with ESRD on hemodialysis-> Nephrology team coordinated in house dialysis.  Patient responded well to above interventions and supportive care.  Discharged to home.     Patient unaccompanied today.  Ambulatory and independent with ADL's.  Brought all medication bottles for review and reports compliance.    Review of Systems  General ROS: negative for chills, fever or weight loss  Psychological ROS: negative for hallucination, depression or suicidal ideation  Ophthalmic ROS: negative for blurry vision, photophobia or eye pain  ENT ROS: negative for epistaxis, sore throat or rhinorrhea  Respiratory ROS: no cough, shortness of breath, or wheezing  Cardiovascular ROS: no chest pain or dyspnea on exertion  Gastrointestinal ROS: no abdominal pain, change in bowel habits, or black/ bloody stools  Genito-Urinary ROS: no dysuria, trouble voiding, or hematuria  Musculoskeletal ROS: negative for gait disturbance or muscular weakness  Neurological ROS: no syncope or seizures; no ataxia  Dermatological ROS: negative  for pruritis, rash and jaundice      PAST HISTORY:     Past Medical History:   Diagnosis Date    Allergy     Anemia     Anticoagulant long-term use     Arthritis     CKD (chronic kidney disease) stage 4, GFR 15-29 ml/min     COPD (chronic obstructive pulmonary disease) 08/20/2021    Coronary artery disease     Heart attack 10/04/2019    Hematuria     Hemothorax     Hyperlipidemia     Hypertension     Hyperuricemia     Hypocalcemia     Hypokalemia     Hypophosphatemia     Hypothyroidism 3/2/2023    PAD (peripheral artery disease)     Proteinuria     Vitamin D deficiency        Past Surgical History:   Procedure Laterality Date    ABDOMINAL AORTOGRAPHY N/A 5/10/2019    Procedure: AORTOGRAM-ABDOMINAL;  Surgeon: Keo Jenkins MD;  Location: [facility] CATH LAB/EP;  Service: Cardiology;  Laterality: N/A;  RSFA intervention     AORTOGRAPHY WITH SERIALOGRAPHY N/A 12/3/2021    Procedure: AORTOGRAM, WITH SERIALOGRAPHY;  Surgeon: Keo Jenkins MD;  Location: [facility] CATH LAB/EP;  Service: Cardiology;  Laterality: N/A;    AORTOGRAPHY WITH SERIALOGRAPHY N/A 4/1/2022    Procedure: AORTOGRAM, WITH SERIALOGRAPHY;  Surgeon: Keo Jenkins MD;  Location: [facility] CATH LAB/EP;  Service: Cardiology;  Laterality: N/A;    ATHERECTOMY, CORONARY N/A 4/25/2023    Procedure: Atherectomy-coronary;  Surgeon: Keo Jenkins MD;  Location: [facility] CATH LAB/EP;  Service: Cardiology;  Laterality: N/A;    BONE MARROW BIOPSY Right 10/12/2021    Procedure: BIOPSY-BONE MARROW;  Surgeon: Naseem Woods MD;  Location: [facility] OR;  Service: Oncology;  Laterality: Right;    CARDIAC CATHETERIZATION      CATARACT EXTRACTION      CATARACT EXTRACTION W/  INTRAOCULAR LENS IMPLANT Right 6/8/2020    Procedure: EXTRACTION, CATARACT, WITH IOL INSERTION;  Surgeon: Tin Light MD;  Location: [facility] OR;  Service: Ophthalmology;  Laterality: Right;    CATARACT EXTRACTION W/  INTRAOCULAR LENS IMPLANT Left 7/2/2020    Procedure: EXTRACTION, CATARACT, WITH IOL INSERTION;  Surgeon:  Tin Light MD;  Location: [facility] OR;  Service: Ophthalmology;  Laterality: Left;    COLONOSCOPY N/A 1/6/2022    Procedure: COLONOSCOPY;  Surgeon: Kathe Penaloza MD;  Location: [facility] (2ND FLR);  Service: Endoscopy;  Laterality: N/A;  COVID test at [facility] on 1/3-GT  okay to hold Plavix for 5 days and aspirin per Dr. Becerra  2nd floor due toextensive cardiac history   instructions mailed and my [facility] portal -     CORONARY ANGIOGRAPHY N/A 3/29/2019    Procedure: ANGIOGRAM, CORONARY ARTERY;  Surgeon: Keo Jenkins MD;  Location: [facility] CATH LAB/EP;  Service: Cardiology;  Laterality: N/A;    CORONARY ANGIOGRAPHY Left 10/11/2019    Procedure: ANGIOGRAM, CORONARY ARTERY;  Surgeon: Keo Jenkins MD;  Location: [facility] CATH LAB/EP;  Service: Cardiology;  Laterality: Left;    CORONARY ANGIOGRAPHY N/A 4/10/2023    Procedure: ANGIOGRAM, CORONARY ARTERY;  Surgeon: Keo Jenkins MD;  Location: [facility] CATH LAB/EP;  Service: Cardiology;  Laterality: N/A;    CORONARY ANGIOGRAPHY N/A 6/26/2024    Procedure: ANGIOGRAM, CORONARY ARTERY;  Surgeon: Enoch Tirado MD;  Location: [facility] CATH LAB/EP;  Service: Cardiology;  Laterality: N/A;    CORONARY ANGIOPLASTY WITH STENT PLACEMENT  03/29/2019    mid and distal RCA    ESOPHAGOGASTRODUODENOSCOPY N/A 1/6/2022    Procedure: EGD (ESOPHAGOGASTRODUODENOSCOPY);  Surgeon: Kathe Penaloza MD;  Location: [facility] (2ND FLR);  Service: Endoscopy;  Laterality: N/A;    EYE SURGERY      INSERTION OF TUNNELED CENTRAL VENOUS HEMODIALYSIS CATHETER N/A 2/9/2024    Procedure: INSERTION, CATHETER, HEMODIALYSIS, DUAL LUMEN;  Surgeon: Wang Mendieta MD;  Location: [facility] OR;  Service: General;  Laterality: N/A;    INSTANTANEOUS WAVE-FREE RATIO (IFR) N/A 4/25/2023    Procedure: Instantaneous Wave-Free Ratio (IFR);  Surgeon: Keo Jenkins MD;  Location: [facility] CATH LAB/EP;  Service: Cardiology;  Laterality: N/A;    INTRAVASCULAR ULTRASOUND, NON-CORONARY  8/12/2022    Procedure: Intravascular  Ultrasound, Non-Coronary;  Surgeon: Keo Jenkins MD;  Location: [facility] CATH LAB/EP;  Service: Cardiology;;    IVUS, CORONARY  4/25/2023    Procedure: IVUS, Coronary;  Surgeon: Keo Jenkins MD;  Location: [facility] CATH LAB/EP;  Service: Cardiology;;    IVUS, CORONARY  5/16/2023    Procedure: IVUS, Coronary;  Surgeon: Keo Jenkins MD;  Location: [facility] CATH LAB/EP;  Service: Cardiology;;  CX    LEFT HEART CATHETERIZATION N/A 3/29/2019    Procedure: Left heart cath;  Surgeon: Keo Jenkins MD;  Location: [facility] CATH LAB/EP;  Service: Cardiology;  Laterality: N/A;    LEFT HEART CATHETERIZATION Left 10/8/2019    Procedure: Left heart cath;  Surgeon: Keo Jenkins MD;  Location: [facility] CATH LAB/EP;  Service: Cardiology;  Laterality: Left;    LEFT HEART CATHETERIZATION Left 4/10/2023    Procedure: Left heart cath;  Surgeon: Keo Jenkins MD;  Location: [facility] CATH LAB/EP;  Service: Cardiology;  Laterality: Left;    LEFT HEART CATHETERIZATION Left 4/25/2023    Procedure: Left heart cath;  Surgeon: Keo Jenkins MD;  Location: [facility] CATH LAB/EP;  Service: Cardiology;  Laterality: Left;    LEFT HEART CATHETERIZATION Left 5/16/2023    Procedure: Left heart cath;  Surgeon: Keo Jenkins MD;  Location: [facility] CATH LAB/EP;  Service: Cardiology;  Laterality: Left;    LEFT HEART CATHETERIZATION Left 6/26/2024    Procedure: Left heart cath;  Surgeon: Enoch Tirado MD;  Location: [facility] CATH LAB/EP;  Service: Cardiology;  Laterality: Left;    PERCUTANEOUS CORONARY INTERVENTION, ARTERY N/A 6/26/2024    Procedure: Percutaneous coronary intervention;  Surgeon: Enoch Tirado MD;  Location: [facility] CATH LAB/EP;  Service: Cardiology;  Laterality: N/A;    PERCUTANEOUS TRANSLUMINAL ANGIOPLASTY (PTA) OF PERIPHERAL VESSEL N/A 7/12/2019    Procedure: PTA, PERIPHERAL VESSEL;  Surgeon: Keo Jenkins MD;  Location: [facility] CATH LAB/EP;  Service: Cardiology;  Laterality: N/A;    PERCUTANEOUS TRANSLUMINAL ANGIOPLASTY (PTA) OF PERIPHERAL  VESSEL Left 5/20/2022    Procedure: PTA, PERIPHERAL VESSEL;  Surgeon: Keo Jenkins MD;  Location: [facility] CATH LAB/EP;  Service: Cardiology;  Laterality: Left;    PERCUTANEOUS TRANSLUMINAL ANGIOPLASTY (PTA) OF PERIPHERAL VESSEL Right 8/12/2022    Procedure: PTA, PERIPHERAL VESSEL;  Surgeon: Keo Jenkins MD;  Location: [facility] CATH LAB/EP;  Service: Cardiology;  Laterality: Right;    PERCUTANEOUS TRANSLUMINAL BALLOON ANGIOPLASTY OF CORONARY ARTERY  4/25/2023    Procedure: Angioplasty-coronary;  Surgeon: Keo Jenkins MD;  Location: [facility] CATH LAB/EP;  Service: Cardiology;;    PLACEMENT OF ARTERIOVENOUS GRAFT Left 4/15/2024    Procedure: INSERTION, GRAFT, ARTERIOVENOUS;  Surgeon: Scott Pascual MD;  Location: [facility] OR;  Service: General;  Laterality: Left;    PTCA, SINGLE VESSEL  5/16/2023    Procedure: PTCA, Single Vessel;  Surgeon: Keo Jenkins MD;  Location: [facility] CATH LAB/EP;  Service: Cardiology;;  CX    PTCA, SINGLE VESSEL  6/26/2024    Procedure: PTCA, Single Vessel;  Surgeon: Enoch Tirado MD;  Location: [facility] CATH LAB/EP;  Service: Cardiology;;    REMOVAL OF HEMODIALYSIS CATHETER Right 2/9/2024    Procedure: REMOVAL, CATHETER, HEMODIALYSIS;  Surgeon: Wang Mendieta MD;  Location: [facility] OR;  Service: General;  Laterality: Right;    REMOVAL OF TUNNELED CENTRAL VENOUS CATHETER (CVC) Right 5/20/2024    Procedure: REMOVAL, CATHETER, CENTRAL VENOUS, TUNNELED;  Surgeon: Scott Pascual MD;  Location: [facility] OR;  Service: General;  Laterality: Right;    REPAIR, CHRONIC TOTAL OCCLUSION, CORONARY  6/26/2024    Procedure: Repair, Chronic Total Occlusion, Coronary;  Surgeon: Enoch Tirado MD;  Location: [facility] CATH LAB/EP;  Service: Cardiology;;    STENT, DRUG ELUTING, SINGLE VESSEL, CORONARY  4/25/2023    Procedure: Stent, Drug Eluting, Single Vessel, Coronary;  Surgeon: Keo Jenkins MD;  Location: [facility] CATH LAB/EP;  Service: Cardiology;;    STENT, DRUG ELUTING, SINGLE VESSEL, CORONARY   5/16/2023    Procedure: Stent, Drug Eluting, Single Vessel, Coronary;  Surgeon: Keo Jenkins MD;  Location: [facility] CATH LAB/EP;  Service: Cardiology;;  CX    TRANSESOPHAGEAL ECHOCARDIOGRAM WITH POSSIBLE CARDIOVERSION (JOSE W/ POSS CARDIOVERSION) N/A 6/19/2023    Procedure: Transesophageal echo (JOSE) intra-procedure log documentation;  Surgeon: Keo Jenkins MD;  Location: [facility] CATH LAB/EP;  Service: Cardiology;  Laterality: N/A;       Family History   Problem Relation Name Age of Onset    Aneurysm Mother      Hypertension Mother      Heart disease Mother      Cancer Father      Diabetes Sister 1     Hypertension Sister 1     No Known Problems Brother 1     No Known Problems Son 1          MEDICATIONS & ALLERGIES:     Current Outpatient Medications on File Prior to Visit   Medication Sig Dispense Refill    albuterol (PROVENTIL/VENTOLIN HFA) 90 mcg/actuation inhaler INHALE 2 PUFFS INTO THE LUNGS EVERY 6 HOURS AS NEEDED FOR WHEEZING 54 g 3    albuterol-ipratropium (DUO-NEB) 2.5 mg-0.5 mg/3 mL nebulizer solution Take 3 mLs by nebulization every 6 (six) hours as needed for Wheezing or Shortness of Breath (or cough). Rescue 75 mL 3    amLODIPine (NORVASC) 5 MG tablet Take 1 tablet (5 mg total) by mouth once daily. 90 tablet 3    apixaban (ELIQUIS) 5 mg Tab Take 1 tablet (5 mg total) by mouth 2 (two) times daily. 180 tablet 3    carvediloL (COREG) 25 MG tablet Take 1 tablet (25 mg total) by mouth 2 (two) times daily with meals. 180 tablet 3    clopidogreL (PLAVIX) 75 mg tablet Take 1 tablet (75 mg total) by mouth once daily. 90 tablet 3    coenzyme Q10 100 mg capsule Take 100 mg by mouth once daily.      eplerenone (INSPRA) 50 MG Tab Take 1 tablet (50 mg total) by mouth once daily. 90 tablet 3    hydrALAZINE (APRESOLINE) 50 MG tablet Take 1 tablet (50 mg total) by mouth every 8 (eight) hours. 270 tablet 3    HYDROcodone-acetaminophen (NORCO) 5-325 mg per tablet Take 1 tablet by mouth every 6 (six) hours as needed for  "Pain. (Patient not taking: Reported on 7/1/2024) 24 tablet 0    hydrOXYzine pamoate (VISTARIL) 25 MG Cap TAKE 1 CAPSULE(25 MG) BY MOUTH EVERY NIGHT AS NEEDED FOR INSOMNIA OR ANXIETY 30 capsule 0    isosorbide mononitrate (IMDUR) 30 MG 24 hr tablet Take 1 tablet (30 mg total) by mouth once daily. 90 tablet 3    levothyroxine (SYNTHROID) 75 MCG tablet Take 1 tablet (75 mcg total) by mouth before breakfast. 30 tablet 11    LIDOcaine (XYLOCAINE) 5 % Oint ointment Apply topically as needed.      nitroGLYCERIN (NITROSTAT) 0.4 MG SL tablet Place 1 tablet (0.4 mg total) under the tongue every 5 (five) minutes as needed for Chest pain. 90 tablet 3    nitroGLYCERIN (NITROSTAT) 0.4 MG SL tablet Place 1 tablet (0.4 mg total) under the tongue every 5 (five) minutes as needed for Chest pain (for a max of 3 tabs in 15 minutes). 25 tablet 4    ranolazine (RANEXA) 500 MG Tb12 Take 1 tablet (500 mg total) by mouth 2 (two) times daily. 180 tablet 3    rosuvastatin (CRESTOR) 40 MG Tab Take 1 tablet (40 mg total) by mouth every evening. 90 tablet 3    sodium bicarbonate 650 MG tablet Take 1 tablet (650 mg total) by mouth once daily. for 30 doses (Patient not taking: Reported on 7/1/2024) 30 tablet 3     Current Facility-Administered Medications on File Prior to Visit   Medication Dose Route Frequency Provider Last Rate Last Admin    sodium chloride 0.9% infusion   Intravenous Continuous PRN Ittmann, Bushra L, CRNA   New Bag at 11/14/22 1033        Review of patient's allergies indicates:   Allergen Reactions    Ace inhibitors Rash       OBJECTIVE:     Vital Signs:  BP (!) 115/54 (BP Location: Right arm, Patient Position: Sitting, BP Method: Large (Automatic))   Pulse 65   Ht 5' 10" (1.778 m)   Wt 80.4 kg (177 lb 4 oz)   SpO2 97%   BMI 25.43 kg/m²   Wt Readings from Last 3 Encounters:   06/25/24 1941 83.7 kg (184 lb 8.4 oz)   06/25/24 0829 81.6 kg (180 lb)   06/25/24 0027 81.6 kg (180 lb)   06/11/24 0858 80.5 kg (177 lb 7.5 oz) " "  06/07/24 1355 81.8 kg (180 lb 7.1 oz)     Body mass index is 25.43 kg/m².        Physical Exam:  BP (!) 115/54 (BP Location: Right arm, Patient Position: Sitting, BP Method: Large (Automatic))   Pulse 65   Ht 5' 10" (1.778 m)   Wt 80.4 kg (177 lb 4 oz)   SpO2 97%   BMI 25.43 kg/m²   General appearance: alert, cooperative, no distress  Constitutional:Oriented to person, place, and time  + appears well-developed and well-nourished.   HEENT: Normocephalic, atraumatic, neck symmetrical, no nasal discharge   Eyes: conjunctivae/corneas clear, PERRL, EOM's intact  Lungs: clear to auscultation bilaterally, no dullness to percussion bilaterally  Heart: regular rate and rhythm without rub; no displacement of the PMI\  + murmur    Abdomen: soft, non-tender; bowel sounds normoactive; no organomegaly  Extremities: extremities symmetric; no clubbing, cyanosis, or edema  Integument: Skin color, texture, turgor normal; no rashes; hair distrubution normal  Neurologic: Alert and oriented X 3, normal strength, normal coordination and gait  Psychiatric: no pressured speech; normal affect; no evidence of impaired cognition     Laboratory  Lab Results   Component Value Date    WBC 4.90 06/27/2024    HGB 8.1 (L) 06/27/2024    HCT 24.6 (L) 06/27/2024    MCV 85 06/27/2024     06/27/2024     BMP  Lab Results   Component Value Date     06/26/2024    K 3.3 (L) 06/26/2024     06/26/2024    CO2 20 (L) 06/26/2024    BUN 41 (H) 06/26/2024    CREATININE 4.7 (H) 06/26/2024    CALCIUM 9.0 06/26/2024    ANIONGAP 14 06/26/2024    EGFRNORACEVR 13 (A) 06/26/2024     Lab Results   Component Value Date    ALT 8 (L) 06/25/2024    AST 17 06/25/2024    ALKPHOS 64 06/25/2024    BILITOT 0.5 06/25/2024     Lab Results   Component Value Date    INR 1.3 (H) 06/25/2024    INR 1.3 (H) 06/25/2024    INR 1.1 02/08/2024     Lab Results   Component Value Date    HGBA1C 4.6 06/25/2024       Diagnostic Results:    Cardiac catheterization " 6/26/24:  Assessment:   1.Successful angioplasty of LCX/OM with acceptable results - regaining YADIRA III flow  2.Patent stents in LAD and RCA      Plan:   1.DAPT  2.GDMT and intense statin therapy +/- repatha  3.PET stress test as an outpatient, if ISR again will need brachytherapy  4.F/u cardiology     TTE 6/25/24:    Left Ventricle: The left ventricle is mildly dilated. There is eccentric hypertrophy. Regional wall motion abnormalities present. See diagram for wall motion findings. There is mildly reduced systolic function with a visually estimated ejection fraction of 45 - 50%. There is indeterminate diastolic function.Elevated left ventricular filling pressure.    Right Ventricle: Normal right ventricular cavity size. Wall thickness is normal. Systolic function is normal.    Left Atrium: Left atrium is mildly dilated.    Right Atrium: Right atrium is mildly dilated.    Mitral Valve: There is mild to moderate regurgitation.    Pulmonic Valve: There is moderate regurgitation.    Pulmonary Artery: There is pulmonary hypertension. The estimated pulmonary artery systolic pressure is 47 mmHg.    IVC/SVC: Intermediate venous pressure at 8 mmHg.    TRANSITION OF CARE:     [facility] Contact Note: 7/12/24    Family and/or Caretaker present at visit?  No.  Diagnostic tests reviewed/disposition: I have reviewed all completed as well as pending diagnostic tests at the time of discharge.  Disease/illness education: Yes  Home health/community services discussion/referrals: Patient does not have home health established from hospital visit.  They do not need home health.  If needed, we will set up home health for the patient.   Establishment or re-establishment of referral orders for community resources: No other necessary community resources.   Discussion with other health care providers: No discussion with other health care providers necessary.     ASSESSMENT & PLAN:       NSTEMI (non-ST elevated myocardial infarction)  -  recent hospitalization as above  - s/p balloon angioplasty with good response  - continue current medication regimen  - see Cardiology team 7/10/24    ESRD (end stage renal disease) on dialysis  - attends dialysis T,Th,Sat  @ [facility] location   - access is Left arm fistula  - primary nephrologist is Dr Loly Payne    Paroxysmal atrial fibrillation  Anticoagulated  - continue Eliquis     Patient will be released from hospital follow up clinic.  Follow up as detailed below.     Instructions for the patient:      Scheduled Follow-up :  Future Appointments   Date Time Provider Department Center   7/1/2024  3:00 PM Adelaide Puga MD [facility] IMPRI [facility]   7/10/2024  1:20 PM Enoch Tirado MD [facility] CARDIO [facility]   12/16/2024  1:40 PM Perez Bell DO [facility]       Post Visit Medication List:     Medication List            Accurate as of July 1, 2024  3:20 PM. If you have any questions, ask your nurse or doctor.                CONTINUE taking these medications      albuterol 90 mcg/actuation inhaler  Commonly known as: PROVENTIL/VENTOLIN HFA  INHALE 2 PUFFS INTO THE LUNGS EVERY 6 HOURS AS NEEDED FOR WHEEZING     albuterol-ipratropium 2.5 mg-0.5 mg/3 mL nebulizer solution  Commonly known as: DUO-NEB  Take 3 mLs by nebulization every 6 (six) hours as needed for Wheezing or Shortness of Breath (or cough). Rescue     amLODIPine 5 MG tablet  Commonly known as: NORVASC  Take 1 tablet (5 mg total) by mouth once daily.     apixaban 5 mg Tab  Commonly known as: ELIQUIS  Take 1 tablet (5 mg total) by mouth 2 (two) times daily.     carvediloL 25 MG tablet  Commonly known as: COREG  Take 1 tablet (25 mg total) by mouth 2 (two) times daily with meals.     clopidogreL 75 mg tablet  Commonly known as: PLAVIX  Take 1 tablet (75 mg total) by mouth once daily.     coenzyme Q10 100 mg capsule     eplerenone 50 MG Tab  Commonly known as: INSPRA  Take 1 tablet (50 mg total) by mouth once  daily.     hydrALAZINE 50 MG tablet  Commonly known as: APRESOLINE  Take 1 tablet (50 mg total) by mouth every 8 (eight) hours.     HYDROcodone-acetaminophen 5-325 mg per tablet  Commonly known as: NORCO  Take 1 tablet by mouth every 6 (six) hours as needed for Pain.     hydrOXYzine pamoate 25 MG Cap  Commonly known as: VISTARIL  TAKE 1 CAPSULE(25 MG) BY MOUTH EVERY NIGHT AS NEEDED FOR INSOMNIA OR ANXIETY     isosorbide mononitrate 30 MG 24 hr tablet  Commonly known as: IMDUR  Take 1 tablet (30 mg total) by mouth once daily.     levothyroxine 75 MCG tablet  Commonly known as: SYNTHROID  Take 1 tablet (75 mcg total) by mouth before breakfast.     LIDOcaine 5 % Oint ointment  Commonly known as: XYLOCAINE     * nitroGLYCERIN 0.4 MG SL tablet  Commonly known as: NITROSTAT  Place 1 tablet (0.4 mg total) under the tongue every 5 (five) minutes as needed for Chest pain.     * nitroGLYCERIN 0.4 MG SL tablet  Commonly known as: NITROSTAT  Place 1 tablet (0.4 mg total) under the tongue every 5 (five) minutes as needed for Chest pain (for a max of 3 tabs in 15 minutes).     ranolazine 500 MG Tb12  Commonly known as: RANEXA  Take 1 tablet (500 mg total) by mouth 2 (two) times daily.     rosuvastatin 40 MG Tab  Commonly known as: CRESTOR  Take 1 tablet (40 mg total) by mouth every evening.           * This list has 2 medication(s) that are the same as other medications prescribed for you. Read the directions carefully, and ask your doctor or other care provider to review them with you.                STOP taking these medications      sodium bicarbonate 650 MG tablet  Stopped by: Adelaide Puga MD              Signing Physician:  Adelaide Puga MD

## 2024-07-10 ENCOUNTER — OFFICE VISIT (OUTPATIENT)
Dept: CARDIOLOGY | Facility: CLINIC | Age: 64
End: 2024-07-10
Payer: COMMERCIAL

## 2024-07-10 VITALS
OXYGEN SATURATION: 98 % | HEART RATE: 64 BPM | WEIGHT: 172.06 LBS | DIASTOLIC BLOOD PRESSURE: 57 MMHG | SYSTOLIC BLOOD PRESSURE: 125 MMHG | BODY MASS INDEX: 24.63 KG/M2 | HEIGHT: 70 IN

## 2024-07-10 DIAGNOSIS — E78.2 MIXED HYPERLIPIDEMIA: ICD-10-CM

## 2024-07-10 DIAGNOSIS — I70.213 ATHEROSCLEROSIS OF NATIVE ARTERY OF BOTH LOWER EXTREMITIES WITH INTERMITTENT CLAUDICATION: ICD-10-CM

## 2024-07-10 DIAGNOSIS — I48.19 PERSISTENT ATRIAL FIBRILLATION: ICD-10-CM

## 2024-07-10 DIAGNOSIS — I48.0 PAROXYSMAL ATRIAL FIBRILLATION: ICD-10-CM

## 2024-07-10 DIAGNOSIS — I73.9 CLAUDICATION IN PERIPHERAL VASCULAR DISEASE: ICD-10-CM

## 2024-07-10 DIAGNOSIS — I25.10 ATHEROSCLEROSIS OF NATIVE CORONARY ARTERY OF NATIVE HEART WITHOUT ANGINA PECTORIS: Primary | ICD-10-CM

## 2024-07-10 DIAGNOSIS — I25.111 CORONARY ARTERY DISEASE INVOLVING NATIVE CORONARY ARTERY OF NATIVE HEART WITH ANGINA PECTORIS WITH DOCUMENTED SPASM: ICD-10-CM

## 2024-07-10 DIAGNOSIS — I10 PRIMARY HYPERTENSION: ICD-10-CM

## 2024-07-10 DIAGNOSIS — I10 ESSENTIAL HYPERTENSION: ICD-10-CM

## 2024-07-10 DIAGNOSIS — I21.4 NSTEMI (NON-ST ELEVATED MYOCARDIAL INFARCTION): ICD-10-CM

## 2024-07-10 DIAGNOSIS — I65.23 BILATERAL CAROTID ARTERY STENOSIS: ICD-10-CM

## 2024-07-10 PROCEDURE — 1159F MED LIST DOCD IN RCRD: CPT | Mod: CPTII,S$GLB,, | Performed by: STUDENT IN AN ORGANIZED HEALTH CARE EDUCATION/TRAINING PROGRAM

## 2024-07-10 PROCEDURE — 3062F POS MACROALBUMINURIA REV: CPT | Mod: CPTII,S$GLB,, | Performed by: STUDENT IN AN ORGANIZED HEALTH CARE EDUCATION/TRAINING PROGRAM

## 2024-07-10 PROCEDURE — 1111F DSCHRG MED/CURRENT MED MERGE: CPT | Mod: CPTII,S$GLB,, | Performed by: STUDENT IN AN ORGANIZED HEALTH CARE EDUCATION/TRAINING PROGRAM

## 2024-07-10 PROCEDURE — 1160F RVW MEDS BY RX/DR IN RCRD: CPT | Mod: CPTII,S$GLB,, | Performed by: STUDENT IN AN ORGANIZED HEALTH CARE EDUCATION/TRAINING PROGRAM

## 2024-07-10 PROCEDURE — 3074F SYST BP LT 130 MM HG: CPT | Mod: CPTII,S$GLB,, | Performed by: STUDENT IN AN ORGANIZED HEALTH CARE EDUCATION/TRAINING PROGRAM

## 2024-07-10 PROCEDURE — 99215 OFFICE O/P EST HI 40 MIN: CPT | Mod: S$GLB,,, | Performed by: STUDENT IN AN ORGANIZED HEALTH CARE EDUCATION/TRAINING PROGRAM

## 2024-07-10 PROCEDURE — 3066F NEPHROPATHY DOC TX: CPT | Mod: CPTII,S$GLB,, | Performed by: STUDENT IN AN ORGANIZED HEALTH CARE EDUCATION/TRAINING PROGRAM

## 2024-07-10 PROCEDURE — 3044F HG A1C LEVEL LT 7.0%: CPT | Mod: CPTII,S$GLB,, | Performed by: STUDENT IN AN ORGANIZED HEALTH CARE EDUCATION/TRAINING PROGRAM

## 2024-07-10 PROCEDURE — 3008F BODY MASS INDEX DOCD: CPT | Mod: CPTII,S$GLB,, | Performed by: STUDENT IN AN ORGANIZED HEALTH CARE EDUCATION/TRAINING PROGRAM

## 2024-07-10 PROCEDURE — 3078F DIAST BP <80 MM HG: CPT | Mod: CPTII,S$GLB,, | Performed by: STUDENT IN AN ORGANIZED HEALTH CARE EDUCATION/TRAINING PROGRAM

## 2024-07-10 PROCEDURE — 99999 PR PBB SHADOW E&M-EST. PATIENT-LVL IV: CPT | Mod: PBBFAC,,, | Performed by: STUDENT IN AN ORGANIZED HEALTH CARE EDUCATION/TRAINING PROGRAM

## 2024-07-10 RX ORDER — RANOLAZINE 1000 MG/1
1000 TABLET, EXTENDED RELEASE ORAL 2 TIMES DAILY
Qty: 180 TABLET | Refills: 3 | Status: SHIPPED | OUTPATIENT
Start: 2024-07-10 | End: 2025-07-10

## 2024-07-10 NOTE — PROGRESS NOTES
Subjective:   @Patient ID:  Domingo Gross is a 64 y.o. male who presents for follow-up of No chief complaint on file.      HPI:   Mr. Gross is a pleasant 62 yo gentleman with hx of HTN, CKD IV, PAD with multiple intervention, CAD (mid LAD/prox RCA PCI 3/2019 and prox LCA in 10/2019 following out of hospital cardiac arrest), cardiac arrest in setting of prox LCX occlusion treated with PCI with normal LV function.      3/2023: Mr. Gross returned for follow-up. Recently spoke with him on the phone regarding confusion on whether he was on eliquis or not. His wife confirmed he was on eliquis and aspirin. He has been having angina. Dr. Jenkins has a stress test ordered. He was initiated on Imdur. His Hgb has been stable (9-10).     5/2023: Underwent LCX PCI with Dr. Jenkins.     6/2023: Underwent JOSE/DCCV with Dr. Jenkins. Initiated on amiodarone.    03/01/24: Mr Gross reports chest pain similar to his previous events where he required stents.     7/10/24: Recently admitted with CP s/p Cath-angioplasty LCX-OM with KBI with acceptable results. Symptoms improved. Reports no chest pain.       Nuclear stress   Impression:     1. No convincing scintigraphic evidence for ischemia or infarct.  2. LV ejection fraction of 52 % with evidence of LV dilatation.      Patient Active Problem List    Diagnosis Date Noted    Anticoagulated 07/01/2024    NSTEMI (non-ST elevated myocardial infarction) 06/25/2024    Hemodialysis catheter dysfunction 05/16/2024    S/P arteriovenous (AV) graft placement 04/23/2024    Membranous nephropathy determined by biopsy 02/20/2024    Closed fracture of transverse process of lumbar vertebra 02/16/2024    ESRD (end stage renal disease) on dialysis 02/16/2024    Fall 02/16/2024    EBV infection 01/31/2024    CMV (cytomegalovirus infection) 01/31/2024    Paroxysmal atrial fibrillation 08/24/2023    Hypothyroidism 03/02/2023    Unstable angina 03/02/2023     I have messaged his cardiologist and  electrophysiologist about this. Currently awaiting response       Persistent atrial fibrillation 11/12/2022    Anemia due to chronic kidney disease, on chronic dialysis 12/15/2021    COPD (chronic obstructive pulmonary disease) 08/20/2021    Nuclear sclerosis, bilateral 06/08/2020    Nephrotic range proteinuria 04/06/2020    Cardiac arrest 10/04/2019    Bilateral carotid artery stenosis     Coronary artery disease involving native coronary artery of native heart with angina pectoris with documented spasm 03/29/2019         3/29/2019    S/p LHC via R radial           LM, LAD, and LCX are patent with luminal irregularities  RCA mid 95% calcified lesion  Normal EF with LVEDP 8 mmHg           PCI of mid LAD with 2.5 x 30 and 2.5 x 15 mm Resolute REZA  PCI of proximal RCA to treat guide dissection with 3.5 x 22 Resolute REZA        10/11/2019 for cardiac arrest        S/p staged LCX PCI                    L CFA access              IVUS guided PCI              3.0 x 15 mm Resolute REZA post dilated with 3.5 NC balloon           Abnormal cardiovascular stress test 02/27/2019         Nuclear stress test 2/2019     + ECG with 2 mm ST depression   No wall motion abnormalities   Test stopped at 6 minutes, 7 METs, 86% predicted heart rate   Stopped because of R leg claudication + ECG changes            Venous insufficiency of both lower extremities 06/04/2018    Atherosclerosis of native artery of both lower extremities with intermittent claudication 03/02/2018    Hyperlipidemia 06/12/2015    DJD (degenerative joint disease), lumbar 05/27/2015    Claudication in peripheral vascular disease 06/13/2014    HTN (hypertension) 04/29/2013                    LABS  CBC  @LABRCNTIP(WBC:3,RBC:3,HGB:3,HCT:3,PLT:3,MCV:3,MCH:3,MCHC:3)@  BMP  @LABRCNTIP(NA:3,K:3,CO2:3,CL:3,BUN:3,Creatinine:3,GLU:3,GFR:3)@    POCT-Glucose  No results found for:  ""POCTGLUCOSE"    @LABRCNTIP(Calcium:3,MG:3,PHOS:3)@  LFT  @LABRCNTIP(PROT:3,Albumin:3,,Bilitot:3,AST:3,Alkphos:3,ALT:3)@  GFR     COAGS  @LABRCNTIP(PT:3,INR:3,APTT:3)@  CE  @LABRCNTIP(troponini:3,cktotal:3,ckmb:3)@  ABGs  @[id number](PH,PCO2,PO2,HCO3,POCSATURATED,BE)@  BNP  @LABRCNTIP(BNP:3)@    LAST HbA1c  Lab Results   Component Value Date    HGBA1C 4.6 06/25/2024       Lipid panel  Lab Results   Component Value Date    CHOL 129 06/25/2024    CHOL 132 06/06/2024    CHOL 144 08/30/2023     Lab Results   Component Value Date    HDL 63 06/25/2024    HDL 58 06/06/2024    HDL 53 08/30/2023     Lab Results   Component Value Date    LDLCALC 55.2 (L) 06/25/2024    LDLCALC 56.0 (L) 06/06/2024    LDLCALC 63.8 08/30/2023     Lab Results   Component Value Date    TRIG 54 06/25/2024    TRIG 90 06/06/2024    TRIG 136 08/30/2023     Lab Results   Component Value Date    CHOLHDL 48.8 06/25/2024    CHOLHDL 43.9 06/06/2024    CHOLHDL 36.8 08/30/2023            Review of Systems   Cardiovascular:  Negative for chest pain.   All other systems reviewed and are negative.      Objective:   General: No acute stress  HENT: EOM intact, PERRL, oropharynx clear, mucous membranes clear and moist   Pulmonary: CTAB  Cardiovascular: regular rhythm, nl S1/S2, no murmurs, rubs or gallops  Abdomen: soft, non-tender, no distended no splenomegaly or hepatomegaly   Skin: warm, dry, no erythema, no rashes    Extremities: periph pulses intact, no cyanosis or edema   Neuro: a/ox4, clear speech, follows commands, no weakness or focal neurologic  deficit   Psych: mood and behavior normal       Assessment:     1. Atherosclerosis of native coronary artery of native heart without angina pectoris    2. Primary hypertension    3. Essential hypertension    4. Claudication in peripheral vascular disease    5. Atherosclerosis of native artery of both lower extremities with intermittent claudication    6. Coronary artery disease involving native coronary artery of " native heart with angina pectoris with documented spasm    7. Bilateral carotid artery stenosis    8. Mixed hyperlipidemia    9. Persistent atrial fibrillation    10. Paroxysmal atrial fibrillation    11. NSTEMI (non-ST elevated myocardial infarction)          Plan:     CAD with multiple interventions and know coronary anatomy. Now s/p cath with angioplasty of Cx and OM with acceptable results. Optimized anti anginals  HTN- continue current medication. Will uptitrate as tolerated.   Afib per EP- continue eliquis    Continue with current medical plan and lifestyle changes.  Return sooner for concerns or questions. If symptoms persist go to the ED  I have reviewed all pertinent data on this patient       I have reviewed the patient's medical history in detail and updated the computerized patient record.    No orders of the defined types were placed in this encounter.      Follow up as scheduled. Return sooner for concerns or questions            He expressed verbal understanding and agreed with the plan        Patient's Medications   New Prescriptions    RANOLAZINE (RANEXA) 1,000 MG TB12    Take 1 tablet (1,000 mg total) by mouth 2 (two) times daily.   Previous Medications    ALBUTEROL (PROVENTIL/VENTOLIN HFA) 90 MCG/ACTUATION INHALER    INHALE 2 PUFFS INTO THE LUNGS EVERY 6 HOURS AS NEEDED FOR WHEEZING    ALBUTEROL-IPRATROPIUM (DUO-NEB) 2.5 MG-0.5 MG/3 ML NEBULIZER SOLUTION    Take 3 mLs by nebulization every 6 (six) hours as needed for Wheezing or Shortness of Breath (or cough). Rescue    AMLODIPINE (NORVASC) 5 MG TABLET    Take 1 tablet (5 mg total) by mouth once daily.    APIXABAN (ELIQUIS) 5 MG TAB    Take 1 tablet (5 mg total) by mouth 2 (two) times daily.    CARVEDILOL (COREG) 25 MG TABLET    Take 1 tablet (25 mg total) by mouth 2 (two) times daily with meals.    CLOPIDOGREL (PLAVIX) 75 MG TABLET    Take 1 tablet (75 mg total) by mouth once daily.    COENZYME Q10 100 MG CAPSULE    Take 100 mg by mouth once  daily.    EPLERENONE (INSPRA) 50 MG TAB    Take 1 tablet (50 mg total) by mouth once daily.    HYDRALAZINE (APRESOLINE) 50 MG TABLET    Take 1 tablet (50 mg total) by mouth every 8 (eight) hours.    HYDROCODONE-ACETAMINOPHEN (NORCO) 5-325 MG PER TABLET    Take 1 tablet by mouth every 6 (six) hours as needed for Pain.    HYDROXYZINE PAMOATE (VISTARIL) 25 MG CAP    TAKE 1 CAPSULE(25 MG) BY MOUTH EVERY NIGHT AS NEEDED FOR INSOMNIA OR ANXIETY    ISOSORBIDE MONONITRATE (IMDUR) 30 MG 24 HR TABLET    Take 1 tablet (30 mg total) by mouth once daily.    LEVOTHYROXINE (SYNTHROID) 75 MCG TABLET    Take 1 tablet (75 mcg total) by mouth before breakfast.    LIDOCAINE (XYLOCAINE) 5 % OINT OINTMENT    Apply topically as needed.    NITROGLYCERIN (NITROSTAT) 0.4 MG SL TABLET    Place 1 tablet (0.4 mg total) under the tongue every 5 (five) minutes as needed for Chest pain.    NITROGLYCERIN (NITROSTAT) 0.4 MG SL TABLET    Place 1 tablet (0.4 mg total) under the tongue every 5 (five) minutes as needed for Chest pain (for a max of 3 tabs in 15 minutes).    ROSUVASTATIN (CRESTOR) 40 MG TAB    Take 1 tablet (40 mg total) by mouth every evening.   Modified Medications    No medications on file   Discontinued Medications    RANOLAZINE (RANEXA) 500 MG TB12    Take 1 tablet (500 mg total) by mouth 2 (two) times daily.        Enoch Salazar MD  Cardiovascular Disease [facility]

## 2024-07-23 DIAGNOSIS — I25.10 ATHEROSCLEROSIS OF NATIVE CORONARY ARTERY OF NATIVE HEART WITHOUT ANGINA PECTORIS: ICD-10-CM

## 2024-07-23 DIAGNOSIS — R07.9 CHEST PAIN, UNSPECIFIED TYPE: ICD-10-CM

## 2024-07-23 RX ORDER — ISOSORBIDE MONONITRATE 30 MG/1
30 TABLET, EXTENDED RELEASE ORAL DAILY
Qty: 90 TABLET | Refills: 3 | Status: SHIPPED | OUTPATIENT
Start: 2024-07-23 | End: 2025-07-23

## 2024-08-17 NOTE — SUBJECTIVE & OBJECTIVE
Interval History: No CP, on RA    Review of Systems   Constitutional:  Positive for appetite change and fever.   HENT:  Negative for trouble swallowing.    Eyes:  Negative for visual disturbance.   Respiratory:  Positive for cough. Negative for shortness of breath.    Cardiovascular:  Negative for chest pain, palpitations and leg swelling.   Gastrointestinal:  Negative for abdominal pain, constipation, diarrhea, nausea and vomiting.   Genitourinary:  Negative for dysuria and hematuria.   Neurological:  Negative for weakness and headaches.   Psychiatric/Behavioral:  Negative for confusion.      Objective:     Vital Signs (Most Recent):  Temp: (!) 102.3 °F (39.1 °C) (01/27/24 1254)  Pulse: 64 (01/27/24 1309)  Resp: (!) 24 (01/27/24 1309)  BP: (!) 143/64 (01/27/24 1309)  SpO2: 96 % (01/27/24 1309) Vital Signs (24h Range):  Temp:  [99.8 °F (37.7 °C)-103.3 °F (39.6 °C)] 102.3 °F (39.1 °C)  Pulse:  [62-73] 64  Resp:  [20-24] 24  SpO2:  [94 %-96 %] 96 %  BP: (143-198)/(64-88) 143/64     Weight: 81.6 kg (180 lb)  Body mass index is 25.83 kg/m².    Intake/Output Summary (Last 24 hours) at 1/27/2024 1503  Last data filed at 1/27/2024 1252  Gross per 24 hour   Intake 100 ml   Output --   Net 100 ml         Physical Exam  Constitutional:       General: He is not in acute distress.     Appearance: He is ill-appearing. He is not toxic-appearing.   Eyes:      General: No scleral icterus.        Right eye: No discharge.         Left eye: No discharge.      Comments: Bright red spot remnant of subconjunctival hemorrhage in R eye   Cardiovascular:      Pulses: Normal pulses.   Pulmonary:      Effort: Pulmonary effort is normal. No respiratory distress.      Breath sounds: Normal breath sounds. No wheezing.   Abdominal:      General: Bowel sounds are normal.      Palpations: Abdomen is soft.      Tenderness: There is no abdominal tenderness. There is no guarding or rebound.   Musculoskeletal:         General: Normal range of motion.       Right lower leg: No edema.      Left lower leg: No edema.   Skin:     General: Skin is warm.      Coloration: Skin is not jaundiced.   Neurological:      Mental Status: He is alert and oriented to person, place, and time.      Motor: No weakness.   Psychiatric:         Mood and Affect: Mood normal.         Behavior: Behavior normal.         Thought Content: Thought content normal.             Significant Labs: All pertinent labs within the past 24 hours have been reviewed.  CBC:   Recent Labs   Lab 01/27/24  1046   WBC 6.15   HGB 10.6*   HCT 30.5*   *     CMP:   Recent Labs   Lab 01/27/24  1046      K 2.4*      CO2 24      BUN 21   CREATININE 3.2*   CALCIUM 8.6*   PROT 6.4   ALBUMIN 2.7*   BILITOT 0.4   ALKPHOS 51*   *   *   ANIONGAP 12       Significant Imaging: I have reviewed all pertinent imaging results/findings within the past 24 hours.   Spontaneous, unlabored and symmetrical

## 2024-09-09 ENCOUNTER — HOSPITAL ENCOUNTER (INPATIENT)
Facility: HOSPITAL | Age: 64
LOS: 1 days | Discharge: HOME | DRG: 189 | End: 2024-09-10
Attending: EMERGENCY MEDICINE | Admitting: INTERNAL MEDICINE
Payer: COMMERCIAL

## 2024-09-09 DIAGNOSIS — R07.9 CHEST PAIN: ICD-10-CM

## 2024-09-09 DIAGNOSIS — E87.70 HYPERVOLEMIA, UNSPECIFIED HYPERVOLEMIA TYPE: Primary | ICD-10-CM

## 2024-09-09 DIAGNOSIS — I25.10 ATHEROSCLEROSIS OF NATIVE CORONARY ARTERY OF NATIVE HEART WITHOUT ANGINA PECTORIS: ICD-10-CM

## 2024-09-09 DIAGNOSIS — N18.6 ESRD (END STAGE RENAL DISEASE): ICD-10-CM

## 2024-09-09 PROBLEM — J81.1 PULMONARY EDEMA: Status: ACTIVE | Noted: 2024-09-09

## 2024-09-09 LAB
ALBUMIN SERPL BCP-MCNC: 3.1 G/DL (ref 3.5–5.2)
ALP SERPL-CCNC: 56 U/L (ref 55–135)
ALT SERPL W/O P-5'-P-CCNC: 8 U/L (ref 10–44)
ANION GAP SERPL CALC-SCNC: 10 MMOL/L (ref 8–16)
AST SERPL-CCNC: 23 U/L (ref 10–40)
BASOPHILS # BLD AUTO: 0.02 K/UL (ref 0–0.2)
BASOPHILS NFR BLD: 0.3 % (ref 0–1.9)
BILIRUB SERPL-MCNC: 0.9 MG/DL (ref 0.1–1)
BNP SERPL-MCNC: 1303 PG/ML (ref 0–99)
BUN SERPL-MCNC: 21 MG/DL (ref 8–23)
CALCIUM SERPL-MCNC: 8.6 MG/DL (ref 8.7–10.5)
CHLORIDE SERPL-SCNC: 100 MMOL/L (ref 95–110)
CO2 SERPL-SCNC: 25 MMOL/L (ref 23–29)
CREAT SERPL-MCNC: 3.9 MG/DL (ref 0.5–1.4)
DIFFERENTIAL METHOD BLD: ABNORMAL
EOSINOPHIL # BLD AUTO: 0.1 K/UL (ref 0–0.5)
EOSINOPHIL NFR BLD: 1.2 % (ref 0–8)
ERYTHROCYTE [DISTWIDTH] IN BLOOD BY AUTOMATED COUNT: 15.6 % (ref 11.5–14.5)
EST. GFR  (NO RACE VARIABLE): 17 ML/MIN/1.73 M^2
GLUCOSE SERPL-MCNC: 95 MG/DL (ref 70–110)
HBV SURFACE AG SERPL QL IA: NORMAL
HCT VFR BLD AUTO: 27.7 % (ref 40–54)
HGB BLD-MCNC: 9.2 G/DL (ref 14–18)
IMM GRANULOCYTES # BLD AUTO: 0.01 K/UL (ref 0–0.04)
IMM GRANULOCYTES NFR BLD AUTO: 0.2 % (ref 0–0.5)
LYMPHOCYTES # BLD AUTO: 0.5 K/UL (ref 1–4.8)
LYMPHOCYTES NFR BLD: 7.7 % (ref 18–48)
MAGNESIUM SERPL-MCNC: 1.7 MG/DL (ref 1.6–2.6)
MCH RBC QN AUTO: 29.1 PG (ref 27–31)
MCHC RBC AUTO-ENTMCNC: 33.2 G/DL (ref 32–36)
MCV RBC AUTO: 88 FL (ref 82–98)
MONOCYTES # BLD AUTO: 0.5 K/UL (ref 0.3–1)
MONOCYTES NFR BLD: 8.7 % (ref 4–15)
NEUTROPHILS # BLD AUTO: 4.9 K/UL (ref 1.8–7.7)
NEUTROPHILS NFR BLD: 81.9 % (ref 38–73)
NRBC BLD-RTO: 0 /100 WBC
PHOSPHATE SERPL-MCNC: 3.8 MG/DL (ref 2.7–4.5)
PLATELET # BLD AUTO: 108 K/UL (ref 150–450)
PMV BLD AUTO: 10.5 FL (ref 9.2–12.9)
POTASSIUM SERPL-SCNC: 3.1 MMOL/L (ref 3.5–5.1)
PROT SERPL-MCNC: 6.6 G/DL (ref 6–8.4)
RBC # BLD AUTO: 3.16 M/UL (ref 4.6–6.2)
SODIUM SERPL-SCNC: 135 MMOL/L (ref 136–145)
TROPONIN I SERPL DL<=0.01 NG/ML-MCNC: 0.04 NG/ML (ref 0–0.03)
TROPONIN I SERPL DL<=0.01 NG/ML-MCNC: 0.04 NG/ML (ref 0–0.03)
WBC # BLD AUTO: 5.96 K/UL (ref 3.9–12.7)

## 2024-09-09 PROCEDURE — 25000003 PHARM REV CODE 250: Performed by: INTERNAL MEDICINE

## 2024-09-09 PROCEDURE — 99900035 HC TECH TIME PER 15 MIN (STAT)

## 2024-09-09 PROCEDURE — 83880 ASSAY OF NATRIURETIC PEPTIDE: CPT | Performed by: EMERGENCY MEDICINE

## 2024-09-09 PROCEDURE — 93005 ELECTROCARDIOGRAM TRACING: CPT

## 2024-09-09 PROCEDURE — 80053 COMPREHEN METABOLIC PANEL: CPT | Performed by: EMERGENCY MEDICINE

## 2024-09-09 PROCEDURE — 93010 ELECTROCARDIOGRAM REPORT: CPT | Mod: ,,, | Performed by: INTERNAL MEDICINE

## 2024-09-09 PROCEDURE — 84100 ASSAY OF PHOSPHORUS: CPT | Performed by: EMERGENCY MEDICINE

## 2024-09-09 PROCEDURE — 94761 N-INVAS EAR/PLS OXIMETRY MLT: CPT

## 2024-09-09 PROCEDURE — 84484 ASSAY OF TROPONIN QUANT: CPT | Mod: 91 | Performed by: INTERNAL MEDICINE

## 2024-09-09 PROCEDURE — 5A1D70Z PERFORMANCE OF URINARY FILTRATION, INTERMITTENT, LESS THAN 6 HOURS PER DAY: ICD-10-PCS | Performed by: STUDENT IN AN ORGANIZED HEALTH CARE EDUCATION/TRAINING PROGRAM

## 2024-09-09 PROCEDURE — 83735 ASSAY OF MAGNESIUM: CPT | Performed by: EMERGENCY MEDICINE

## 2024-09-09 PROCEDURE — 11000001 HC ACUTE MED/SURG PRIVATE ROOM

## 2024-09-09 PROCEDURE — 84484 ASSAY OF TROPONIN QUANT: CPT | Performed by: EMERGENCY MEDICINE

## 2024-09-09 PROCEDURE — 25000003 PHARM REV CODE 250: Performed by: STUDENT IN AN ORGANIZED HEALTH CARE EDUCATION/TRAINING PROGRAM

## 2024-09-09 PROCEDURE — 85025 COMPLETE CBC W/AUTO DIFF WBC: CPT | Performed by: EMERGENCY MEDICINE

## 2024-09-09 PROCEDURE — 86706 HEP B SURFACE ANTIBODY: CPT | Performed by: STUDENT IN AN ORGANIZED HEALTH CARE EDUCATION/TRAINING PROGRAM

## 2024-09-09 PROCEDURE — 99285 EMERGENCY DEPT VISIT HI MDM: CPT | Mod: 25

## 2024-09-09 PROCEDURE — 87340 HEPATITIS B SURFACE AG IA: CPT | Performed by: STUDENT IN AN ORGANIZED HEALTH CARE EDUCATION/TRAINING PROGRAM

## 2024-09-09 RX ORDER — LEVOTHYROXINE SODIUM 75 UG/1
75 TABLET ORAL
Status: DISCONTINUED | OUTPATIENT
Start: 2024-09-10 | End: 2024-09-10 | Stop reason: HOSPADM

## 2024-09-09 RX ORDER — CLOPIDOGREL BISULFATE 75 MG/1
75 TABLET ORAL DAILY
Status: DISCONTINUED | OUTPATIENT
Start: 2024-09-10 | End: 2024-09-10 | Stop reason: HOSPADM

## 2024-09-09 RX ORDER — HYDROXYZINE PAMOATE 25 MG/1
25 CAPSULE ORAL NIGHTLY PRN
Status: DISCONTINUED | OUTPATIENT
Start: 2024-09-09 | End: 2024-09-10 | Stop reason: HOSPADM

## 2024-09-09 RX ORDER — POTASSIUM CHLORIDE 20 MEQ/1
40 TABLET, EXTENDED RELEASE ORAL ONCE
Status: COMPLETED | OUTPATIENT
Start: 2024-09-09 | End: 2024-09-09

## 2024-09-09 RX ORDER — ACETAMINOPHEN 325 MG/1
650 TABLET ORAL EVERY 4 HOURS PRN
Status: DISCONTINUED | OUTPATIENT
Start: 2024-09-09 | End: 2024-09-10 | Stop reason: HOSPADM

## 2024-09-09 RX ORDER — ISOSORBIDE MONONITRATE 30 MG/1
30 TABLET, EXTENDED RELEASE ORAL DAILY
Status: DISCONTINUED | OUTPATIENT
Start: 2024-09-10 | End: 2024-09-10 | Stop reason: HOSPADM

## 2024-09-09 RX ORDER — IBUPROFEN 200 MG
24 TABLET ORAL
Status: DISCONTINUED | OUTPATIENT
Start: 2024-09-09 | End: 2024-09-10 | Stop reason: HOSPADM

## 2024-09-09 RX ORDER — HYDRALAZINE HYDROCHLORIDE 25 MG/1
50 TABLET, FILM COATED ORAL EVERY 8 HOURS
Status: DISCONTINUED | OUTPATIENT
Start: 2024-09-09 | End: 2024-09-10 | Stop reason: HOSPADM

## 2024-09-09 RX ORDER — SODIUM CHLORIDE 0.9 % (FLUSH) 0.9 %
10 SYRINGE (ML) INJECTION EVERY 12 HOURS PRN
Status: DISCONTINUED | OUTPATIENT
Start: 2024-09-09 | End: 2024-09-10 | Stop reason: HOSPADM

## 2024-09-09 RX ORDER — AMLODIPINE BESYLATE 5 MG/1
5 TABLET ORAL DAILY
Status: DISCONTINUED | OUTPATIENT
Start: 2024-09-10 | End: 2024-09-10 | Stop reason: HOSPADM

## 2024-09-09 RX ORDER — GLUCAGON 1 MG
1 KIT INJECTION
Status: DISCONTINUED | OUTPATIENT
Start: 2024-09-09 | End: 2024-09-10 | Stop reason: HOSPADM

## 2024-09-09 RX ORDER — ATORVASTATIN CALCIUM 40 MG/1
80 TABLET, FILM COATED ORAL DAILY
Status: DISCONTINUED | OUTPATIENT
Start: 2024-09-10 | End: 2024-09-10 | Stop reason: HOSPADM

## 2024-09-09 RX ORDER — CARVEDILOL 25 MG/1
25 TABLET ORAL 2 TIMES DAILY WITH MEALS
Status: DISCONTINUED | OUTPATIENT
Start: 2024-09-09 | End: 2024-09-10 | Stop reason: HOSPADM

## 2024-09-09 RX ORDER — RANOLAZINE 500 MG/1
1000 TABLET, EXTENDED RELEASE ORAL 2 TIMES DAILY
Status: DISCONTINUED | OUTPATIENT
Start: 2024-09-09 | End: 2024-09-10 | Stop reason: HOSPADM

## 2024-09-09 RX ORDER — NITROGLYCERIN 0.4 MG/1
0.4 TABLET SUBLINGUAL EVERY 5 MIN PRN
Status: DISCONTINUED | OUTPATIENT
Start: 2024-09-09 | End: 2024-09-10 | Stop reason: HOSPADM

## 2024-09-09 RX ORDER — MUPIROCIN 20 MG/G
OINTMENT TOPICAL 2 TIMES DAILY
Status: CANCELLED | OUTPATIENT
Start: 2024-09-09 | End: 2024-09-14

## 2024-09-09 RX ORDER — SODIUM CHLORIDE 9 MG/ML
INJECTION, SOLUTION INTRAVENOUS ONCE
Status: CANCELLED | OUTPATIENT
Start: 2024-09-09 | End: 2024-09-09

## 2024-09-09 RX ORDER — SODIUM CHLORIDE 9 MG/ML
INJECTION, SOLUTION INTRAVENOUS
Status: CANCELLED | OUTPATIENT
Start: 2024-09-09

## 2024-09-09 RX ORDER — IBUPROFEN 200 MG
16 TABLET ORAL
Status: DISCONTINUED | OUTPATIENT
Start: 2024-09-09 | End: 2024-09-10 | Stop reason: HOSPADM

## 2024-09-09 RX ORDER — ALBUTEROL SULFATE 90 UG/1
2 INHALANT RESPIRATORY (INHALATION) EVERY 6 HOURS PRN
Status: DISCONTINUED | OUTPATIENT
Start: 2024-09-09 | End: 2024-09-10 | Stop reason: HOSPADM

## 2024-09-09 RX ORDER — NALOXONE HCL 0.4 MG/ML
0.02 VIAL (ML) INJECTION
Status: DISCONTINUED | OUTPATIENT
Start: 2024-09-09 | End: 2024-09-10 | Stop reason: HOSPADM

## 2024-09-09 RX ADMIN — POTASSIUM CHLORIDE 40 MEQ: 1500 TABLET, EXTENDED RELEASE ORAL at 03:09

## 2024-09-09 RX ADMIN — CARVEDILOL 25 MG: 25 TABLET, FILM COATED ORAL at 04:09

## 2024-09-09 RX ADMIN — RANOLAZINE 1000 MG: 500 TABLET, FILM COATED, EXTENDED RELEASE ORAL at 08:09

## 2024-09-09 RX ADMIN — APIXABAN 5 MG: 5 TABLET, FILM COATED ORAL at 08:09

## 2024-09-09 RX ADMIN — HYDRALAZINE HYDROCHLORIDE 50 MG: 25 TABLET ORAL at 10:09

## 2024-09-09 NOTE — ED PROVIDER NOTES
Encounter Date: 9/9/2024       History     Chief Complaint   Patient presents with    Shortness of Breath     Arrives via ems with worsening sob x2 days. Ems reports initial sao2-78%, increased to 90's on 4l/min NC. Denies fever. Pt. Is on dialysis t,th,sat. Mild visible dyspnea. Reports improvement of sob with oxygen.      Patient is a 64-year-old male with a history of COPD and end-stage renal disease who presents with worsening shortness of breath over the past 2 days.  Patient was last dialyzed 2 days ago.  EMS reports patient's initial O2 saturation was 78%.  He presents to the ED on nasal cannula oxygen satting 98%.  He denies fever or chills.      Review of patient's allergies indicates:   Allergen Reactions    Ace inhibitors Rash     Past Medical History:   Diagnosis Date    Allergy     Anemia     Anticoagulant long-term use     Arthritis     CKD (chronic kidney disease) stage 4, GFR 15-29 ml/min     COPD (chronic obstructive pulmonary disease) 08/20/2021    Coronary artery disease     Heart attack 10/04/2019    Hematuria     Hemothorax     Hyperlipidemia     Hypertension     Hyperuricemia     Hypocalcemia     Hypokalemia     Hypophosphatemia     Hypothyroidism 3/2/2023    PAD (peripheral artery disease)     Proteinuria     Vitamin D deficiency      Past Surgical History:   Procedure Laterality Date    ABDOMINAL AORTOGRAPHY N/A 5/10/2019    Procedure: AORTOGRAM-ABDOMINAL;  Surgeon: Keo Jenkins MD;  Location: [facility] CATH LAB/EP;  Service: Cardiology;  Laterality: N/A;  RSFA intervention     AORTOGRAPHY WITH SERIALOGRAPHY N/A 12/3/2021    Procedure: AORTOGRAM, WITH SERIALOGRAPHY;  Surgeon: Keo Jenkins MD;  Location: [facility] CATH LAB/EP;  Service: Cardiology;  Laterality: N/A;    AORTOGRAPHY WITH SERIALOGRAPHY N/A 4/1/2022    Procedure: AORTOGRAM, WITH SERIALOGRAPHY;  Surgeon: Keo Jenkins MD;  Location: [facility] CATH LAB/EP;  Service: Cardiology;  Laterality: N/A;    ATHERECTOMY, CORONARY N/A 4/25/2023     Procedure: Atherectomy-coronary;  Surgeon: Keo Jenkins MD;  Location: [facility] CATH LAB/EP;  Service: Cardiology;  Laterality: N/A;    BONE MARROW BIOPSY Right 10/12/2021    Procedure: BIOPSY-BONE MARROW;  Surgeon: Naseem Woods MD;  Location: [facility] OR;  Service: Oncology;  Laterality: Right;    CARDIAC CATHETERIZATION      CATARACT EXTRACTION      CATARACT EXTRACTION W/  INTRAOCULAR LENS IMPLANT Right 6/8/2020    Procedure: EXTRACTION, CATARACT, WITH IOL INSERTION;  Surgeon: Tin Light MD;  Location: [facility] OR;  Service: Ophthalmology;  Laterality: Right;    CATARACT EXTRACTION W/  INTRAOCULAR LENS IMPLANT Left 7/2/2020    Procedure: EXTRACTION, CATARACT, WITH IOL INSERTION;  Surgeon: Tin Light MD;  Location: [facility];  Service: Ophthalmology;  Laterality: Left;    COLONOSCOPY N/A 1/6/2022    Procedure: COLONOSCOPY;  Surgeon: Kathe Penaloza MD;  [facility]  Service: Endoscopy;  Laterality: N/A;  COVID test at [facility] on 1/3-GT  okay to hold Plavix for 5 days and aspirin per Dr. Becerra  2nd floor due toextensive cardiac history   instructions mailed and my [facility] portal -     CORONARY ANGIOGRAPHY N/A 3/29/2019    Procedure: ANGIOGRAM, CORONARY ARTERY;  Surgeon: Keo Jenkins MD;  Location: [facility] CATH LAB/EP;  Service: Cardiology;  Laterality: N/A;    CORONARY ANGIOGRAPHY Left 10/11/2019    Procedure: ANGIOGRAM, CORONARY ARTERY;  Surgeon: Keo Jenkins MD;  Location: [facility] CATH LAB/EP;  Service: Cardiology;  Laterality: Left;    CORONARY ANGIOGRAPHY N/A 4/10/2023    Procedure: ANGIOGRAM, CORONARY ARTERY;  Surgeon: Keo Jenkins MD;  Location: [facility] CATH LAB/EP;  Service: Cardiology;  Laterality: N/A;    CORONARY ANGIOGRAPHY N/A 6/26/2024    Procedure: ANGIOGRAM, CORONARY ARTERY;  Surgeon: Enoch Tirado MD;  Location: [facility] CATH LAB/EP;  Service: Cardiology;  Laterality: N/A;    CORONARY ANGIOPLASTY WITH STENT PLACEMENT  03/29/2019    mid and distal RCA     ESOPHAGOGASTRODUODENOSCOPY N/A 1/6/2022    Procedure: EGD (ESOPHAGOGASTRODUODENOSCOPY);  Surgeon: Kathe Penaloza MD;  Location: [address]);  Service: Endoscopy;  Laterality: N/A;    EYE SURGERY      INSERTION OF TUNNELED CENTRAL VENOUS HEMODIALYSIS CATHETER N/A 2/9/2024    Procedure: INSERTION, CATHETER, HEMODIALYSIS, DUAL LUMEN;  Surgeon: Wang Mendieta MD;  Location: [facility] OR;  Service: General;  Laterality: N/A;    INSTANTANEOUS WAVE-FREE RATIO (IFR) N/A 4/25/2023    Procedure: Instantaneous Wave-Free Ratio (IFR);  Surgeon: Keo Jenkins MD;  Location: [facility] CATH LAB/EP;  Service: Cardiology;  Laterality: N/A;    INTRAVASCULAR ULTRASOUND, NON-CORONARY  8/12/2022    Procedure: Intravascular Ultrasound, Non-Coronary;  Surgeon: Keo Jenkins MD;  Location: [facility] CATH LAB/EP;  Service: Cardiology;;    IVUS, CORONARY  4/25/2023    Procedure: IVUS, Coronary;  Surgeon: Keo Jenkins MD;  Location: [facility] CATH LAB/EP;  Service: Cardiology;;    IVUS, CORONARY  5/16/2023    Procedure: IVUS, Coronary;  Surgeon: eKo Jenkins MD;  Location: [facility] CATH LAB/EP;  Service: Cardiology;;  CX    LEFT HEART CATHETERIZATION N/A 3/29/2019    Procedure: Left heart cath;  Surgeon: Keo Jenkins MD;  Location: [facility] CATH LAB/EP;  Service: Cardiology;  Laterality: N/A;    LEFT HEART CATHETERIZATION Left 10/8/2019    Procedure: Left heart cath;  Surgeon: Keo Jenkins MD;  Location: [facility] CATH LAB/EP;  Service: Cardiology;  Laterality: Left;    LEFT HEART CATHETERIZATION Left 4/10/2023    Procedure: Left heart cath;  Surgeon: Keo Jenkins MD;  Location: [facility] CATH LAB/EP;  Service: Cardiology;  Laterality: Left;    LEFT HEART CATHETERIZATION Left 4/25/2023    Procedure: Left heart cath;  Surgeon: Keo Jenkins MD;  Location: [facility] CATH LAB/EP;  Service: Cardiology;  Laterality: Left;    LEFT HEART CATHETERIZATION Left 5/16/2023    Procedure: Left heart cath;  Surgeon: Keo Jenkins MD;  Location: [facility] CATH LAB/EP;   Service: Cardiology;  Laterality: Left;    LEFT HEART CATHETERIZATION Left 6/26/2024    Procedure: Left heart cath;  Surgeon: Enoch Tirado MD;  Location: [facility] CATH LAB/EP;  Service: Cardiology;  Laterality: Left;    PERCUTANEOUS CORONARY INTERVENTION, ARTERY N/A 6/26/2024    Procedure: Percutaneous coronary intervention;  Surgeon: Enoch Tirado MD;  Location: [facility] CATH LAB/EP;  Service: Cardiology;  Laterality: N/A;    PERCUTANEOUS TRANSLUMINAL ANGIOPLASTY (PTA) OF PERIPHERAL VESSEL N/A 7/12/2019    Procedure: PTA, PERIPHERAL VESSEL;  Surgeon: Keo Jenkins MD;  Location: [facility] CATH LAB/EP;  Service: Cardiology;  Laterality: N/A;    PERCUTANEOUS TRANSLUMINAL ANGIOPLASTY (PTA) OF PERIPHERAL VESSEL Left 5/20/2022    Procedure: PTA, PERIPHERAL VESSEL;  Surgeon: Keo Jenkins MD;  Location: [facility] CATH LAB/EP;  Service: Cardiology;  Laterality: Left;    PERCUTANEOUS TRANSLUMINAL ANGIOPLASTY (PTA) OF PERIPHERAL VESSEL Right 8/12/2022    Procedure: PTA, PERIPHERAL VESSEL;  Surgeon: Keo Jenkins MD;  Location: [facility] CATH LAB/EP;  Service: Cardiology;  Laterality: Right;    PERCUTANEOUS TRANSLUMINAL BALLOON ANGIOPLASTY OF CORONARY ARTERY  4/25/2023    Procedure: Angioplasty-coronary;  Surgeon: Keo Jenkins MD;  Location: [facility] CATH LAB/EP;  Service: Cardiology;;    PLACEMENT OF ARTERIOVENOUS GRAFT Left 4/15/2024    Procedure: INSERTION, GRAFT, ARTERIOVENOUS;  Surgeon: Scott Pascual MD;  Location: [facility] OR;  Service: General;  Laterality: Left;    PTCA, SINGLE VESSEL  5/16/2023    Procedure: PTCA, Single Vessel;  Surgeon: Keo Jenkins MD;  Location: [facility] CATH LAB/EP;  Service: Cardiology;;  CX    PTCA, SINGLE VESSEL  6/26/2024    Procedure: PTCA, Single Vessel;  Surgeon: Enoch Tirado MD;  Location: [facility] CATH LAB/EP;  Service: Cardiology;;    REMOVAL OF HEMODIALYSIS CATHETER Right 2/9/2024    Procedure: REMOVAL, CATHETER, HEMODIALYSIS;  Surgeon: Wang Mendieta MD;  Location: [facility]  OR;  Service: General;  Laterality: Right;    REMOVAL OF TUNNELED CENTRAL VENOUS CATHETER (CVC) Right 2024    Procedure: REMOVAL, CATHETER, CENTRAL VENOUS, TUNNELED;  Surgeon: Scott Pascual MD;  Location: [facility] OR;  Service: General;  Laterality: Right;    REPAIR, CHRONIC TOTAL OCCLUSION, CORONARY  2024    Procedure: Repair, Chronic Total Occlusion, Coronary;  Surgeon: Enoch Tirado MD;  Location: [facility] CATH LAB/EP;  Service: Cardiology;;    STENT, DRUG ELUTING, SINGLE VESSEL, CORONARY  2023    Procedure: Stent, Drug Eluting, Single Vessel, Coronary;  Surgeon: Keo Jenkins MD;  Location: [facility] CATH LAB/EP;  Service: Cardiology;;    STENT, DRUG ELUTING, SINGLE VESSEL, CORONARY  2023    Procedure: Stent, Drug Eluting, Single Vessel, Coronary;  Surgeon: Keo Jenkins MD;  Location: [facility] CATH LAB/EP;  Service: Cardiology;;  CX    TRANSESOPHAGEAL ECHOCARDIOGRAM WITH POSSIBLE CARDIOVERSION (JOSE W/ POSS CARDIOVERSION) N/A 2023    Procedure: Transesophageal echo (JOSE) intra-procedure log documentation;  Surgeon: Keo Jenkins MD;  Location: [facility] CATH LAB/EP;  Service: Cardiology;  Laterality: N/A;     Family History   Problem Relation Name Age of Onset    Aneurysm Mother      Hypertension Mother      Heart disease Mother      Cancer Father      Diabetes Sister 1     Hypertension Sister 1     No Known Problems Brother 1     No Known Problems Son 1      Social History     Tobacco Use    Smoking status: Former     Current packs/day: 0.00     Types: Cigarettes     Quit date: 1999     Years since quittin.4    Smokeless tobacco: Never   Substance Use Topics    Alcohol use: No     Alcohol/week: 0.0 standard drinks of alcohol    Drug use: No     Review of Systems   Constitutional:  Negative for fever.   Respiratory:  Positive for shortness of breath.    Gastrointestinal:  Negative for nausea and vomiting.   All other systems reviewed and are negative.      Physical Exam      Initial Vitals [09/09/24 0705]   BP Pulse Resp Temp SpO2   (!) 140/62 63 (!) 24 97.8 °F (36.6 °C) 99 %      MAP       --         Physical Exam    Nursing note and vitals reviewed.  Constitutional:   Mild respiratory distress.   HENT:   Head: Atraumatic.   Cardiovascular:  Normal rate, regular rhythm and normal heart sounds.           Pulmonary/Chest:   Coarse diminished breath sounds bilateral.   Abdominal: Abdomen is soft. There is no abdominal tenderness.   Musculoskeletal:         General: Normal range of motion.     Neurological: He is alert and oriented to person, place, and time.   Skin: Skin is warm and dry.   Psychiatric: Thought content normal.         ED Course   Procedures  Labs Reviewed   CBC W/ AUTO DIFFERENTIAL - Abnormal       Result Value    WBC 5.96      RBC 3.16 (*)     Hemoglobin 9.2 (*)     Hematocrit 27.7 (*)     MCV 88      MCH 29.1      MCHC 33.2      RDW 15.6 (*)     Platelets 108 (*)     MPV 10.5      Immature Granulocytes 0.2      Gran # (ANC) 4.9      Immature Grans (Abs) 0.01      Lymph # 0.5 (*)     Mono # 0.5      Eos # 0.1      Baso # 0.02      nRBC 0      Gran % 81.9 (*)     Lymph % 7.7 (*)     Mono % 8.7      Eosinophil % 1.2      Basophil % 0.3      Differential Method Automated     COMPREHENSIVE METABOLIC PANEL - Abnormal    Sodium 135 (*)     Potassium 3.1 (*)     Chloride 100      CO2 25      Glucose 95      BUN 21      Creatinine 3.9 (*)     Calcium 8.6 (*)     Total Protein 6.6      Albumin 3.1 (*)     Total Bilirubin 0.9      Alkaline Phosphatase 56      AST 23      ALT 8 (*)     eGFR 17 (*)     Anion Gap 10     TROPONIN I - Abnormal    Troponin I 0.040 (*)    B-TYPE NATRIURETIC PEPTIDE - Abnormal    BNP 1,303 (*)    MAGNESIUM    Magnesium 1.7     PHOSPHORUS    Phosphorus 3.8       EKG Readings: (Independently Interpreted)   Initial Reading: No STEMI. Heart Rate: 61. Conduction: LBBB. ST Segments: Normal ST Segments.     ECG Results              EKG 12-lead (In process)         Collection Time Result Time QRS Duration OHS QTC Calculation    09/09/24 07:45:48 09/09/24 11:17:22 152 497                     In process by Interface, Lab In [facility] (09/09/24 11:17:56)                   Narrative:    Test Reason : N18.6,    Vent. Rate : 061 BPM     Atrial Rate : 070 BPM     P-R Int : 194 ms          QRS Dur : 152 ms      QT Int : 494 ms       P-R-T Axes : 069 -27 105 degrees     QTc Int : 497 ms    Sinus rhythm with marked sinus arrythmia  Left bundle branch block  Abnormal ECG  When compared with ECG of 26-JUN-2024 15:24,  No significant change was found    Referred By: [email or web address]   SELF           Confirmed By:                       In process by Interface, Lab In [facility] (09/09/24 11:10:59)                   Narrative:    Test Reason : N18.6,    Vent. Rate : 061 BPM     Atrial Rate : 070 BPM     P-R Int : 194 ms          QRS Dur : 152 ms      QT Int : 494 ms       P-R-T Axes : 069 -27 105 degrees     QTc Int : 497 ms    Sinus rhythm with marked sinus arrythmia  Left bundle branch block  Abnormal ECG  When compared with ECG of 26-JUN-2024 15:24,  No significant change was found    Referred By: [email or web address]   SELF           Confirmed By:                                   Imaging Results              X-Ray Chest 1 View (Final result)  Result time 09/09/24 08:13:19      Final result by Nato Bacon MD (09/09/24 08:13:19)                   Impression:      Interstitial predominant opacities bilaterally would be in keeping with pulmonary edema, noting that infectious or noninfectious inflammatory etiology could appear similar.    At least small bilateral pleural effusions.      Electronically signed by: Nato Bacon  Date:    09/09/2024  Time:    08:13               Narrative:    EXAMINATION:  XR CHEST 1 VIEW    CLINICAL HISTORY:  ESRD;    TECHNIQUE:  Single frontal view of the chest was performed.    COMPARISON:  Chest radiograph performed 02/15/2024, 10:28 hours.    FINDINGS:  Monitoring  leads overlie the chest.  Grossly unchanged cardiac contours.    Atherosclerosis of the aorta.  Post treatment sequela again project in the chest.    Patchy interstitial predominant opacities bilaterally.    Suspect at least small bilateral pleural effusions.    No definite pneumothorax.    No acute findings in the visualized abdomen    Osseous and soft tissue structures appear without definite acute change.                                       Medications   potassium chloride SA CR tablet 40 mEq (has no administration in time range)     Medical Decision Making  DDx :  Including but not limited to :  Volume overload, electrolyte abnormality, pleural effusion, pneumonia.      Emergent evaluation of a 64-year-old male with end-stage renal disease presenting with shortness of breath.  Chest x-ray shows pulmonary edema.  This has been discussed with both the [facility] hospitalist as well as Nephrology.  He will require admission for emergent dialysis.    Amount and/or Complexity of Data Reviewed  Labs: ordered.     Details: CMP potassium of 3.1, BUN of 21 and creatinine of 3.9.  CBC with a hemoglobin of 9.2 and hematocrit of 27.7.  Radiology: ordered.     Details: Chest x-ray with bilateral pulmonary edema.  Discussion of management or test interpretation with external provider(s): I have discussed the patient's symptoms, x-ray findings and pertinent lab values with the [facility] hospitalist.                                      Clinical Impression:  Final diagnoses:  [N18.6] ESRD (end stage renal disease)  [E87.70] Hypervolemia, unspecified hypervolemia type (Primary)          ED Disposition Condition    Observation Stable                Sanju Packer MD  09/09/24 5015

## 2024-09-09 NOTE — CONSULTS
Nephrology Consult   Note     Consult Requested By: Yolanda Zambrano MD  Reason for Consult: ESRD     SUBJECTIVE:      History of Present Illness:  Domingo Gross is a 64 y.o.   male who  has a past medical history of Allergy, Anemia, Anticoagulant long-term use, Arthritis, CKD (chronic kidney disease) stage 4, GFR 15-29 ml/min, COPD (chronic obstructive pulmonary disease) (08/20/2021), Coronary artery disease, Heart attack (10/04/2019), Hematuria, Hemothorax, Hyperlipidemia, Hypertension, Hyperuricemia, Hypocalcemia, Hypokalemia, Hypophosphatemia, Hypothyroidism (3/2/2023), PAD (peripheral artery disease), Proteinuria, and Vitamin D deficiency.. The patient presented to the [facility] on 9/9/2024 with a primary complaint of SOB started yesterday.    ?    Review of Systems   Constitutional:  Negative for chills and fever.   HENT:  Negative for congestion and sore throat.    Eyes:  Negative for blurred vision, double vision and photophobia.   Respiratory:  Positive for shortness of breath. Negative for cough.    Cardiovascular:  Positive for orthopnea. Negative for chest pain, palpitations and leg swelling.   Gastrointestinal:  Negative for abdominal pain, diarrhea, nausea and vomiting.   Genitourinary:  Negative for dysuria and urgency.   Musculoskeletal:  Negative for joint pain and myalgias.   Skin:  Negative for itching and rash.   Neurological:  Negative for dizziness, sensory change, weakness and headaches.   Endo/Heme/Allergies:  Negative for polydipsia. Does not bruise/bleed easily.   Psychiatric/Behavioral:  Negative for depression.        Past Medical History:   Diagnosis Date    Allergy     Anemia     Anticoagulant long-term use     Arthritis     CKD (chronic kidney disease) stage 4, GFR 15-29 ml/min     COPD (chronic obstructive pulmonary disease) 08/20/2021    Coronary artery disease     Heart attack 10/04/2019    Hematuria     Hemothorax     Hyperlipidemia     Hypertension     Hyperuricemia      Hypocalcemia     Hypokalemia     Hypophosphatemia     Hypothyroidism 3/2/2023    PAD (peripheral artery disease)     Proteinuria     Vitamin D deficiency           OBJECTIVE:     Vital Signs (Most Recent)  Vitals:    09/09/24 0708 09/09/24 0803 09/09/24 0904 09/09/24 1000   BP: (!) 118/59 134/65 122/60 (!) 124/57   BP Location:       Patient Position:       Pulse: 66 69 65 64   Resp: (!) 22 18 19 19   Temp: 97.7 °F (36.5 °C)      TempSrc: Oral      SpO2: 100% 98% 97% 97%   Weight:       Height:                     Medications:            Physical Exam  Vitals and nursing note reviewed.   Constitutional:       General: He is not in acute distress.     Appearance: He is not diaphoretic.   HENT:      Head: Normocephalic and atraumatic.      Mouth/Throat:      Pharynx: No oropharyngeal exudate.   Eyes:      General: No scleral icterus.     Conjunctiva/sclera: Conjunctivae normal.      Pupils: Pupils are equal, round, and reactive to light.   Cardiovascular:      Rate and Rhythm: Normal rate and regular rhythm.      Heart sounds: Normal heart sounds. No murmur heard.  Pulmonary:      Effort: Pulmonary effort is normal. No respiratory distress.      Breath sounds: No rales.   Abdominal:      General: Bowel sounds are normal. There is no distension.      Palpations: Abdomen is soft.      Tenderness: There is no abdominal tenderness.   Musculoskeletal:         General: Normal range of motion.      Cervical back: Normal range of motion and neck supple.      Right lower leg: No edema.      Left lower leg: No edema.      Comments: LUE AVG    Skin:     General: Skin is warm and dry.      Findings: No erythema.   Neurological:      Mental Status: He is alert and oriented to person, place, and time.      Cranial Nerves: No cranial nerve deficit.      Comments:     Psychiatric:         Mood and Affect: Affect normal.         Cognition and Memory: Memory normal.         Judgment: Judgment normal.         Laboratory:  Recent Labs    Lab 09/09/24  0752   WBC 5.96   HGB 9.2*   HCT 27.7*   *   MONO 8.7  0.5   EOSINOPHIL 1.2     Recent Labs   Lab 09/09/24  0752   *   K 3.1*      CO2 25   BUN 21   CREATININE 3.9*   CALCIUM 8.6*   PHOS 3.8       Diagnostic Results:  X-Ray: Reviewed  US: Reviewed  Echo: Reviewed    Left Ventricle: The left ventricle is mildly dilated. There is eccentric hypertrophy. Regional wall motion abnormalities present. See diagram for wall motion findings. There is mildly reduced systolic function with a visually estimated ejection fraction of 45 - 50%. There is indeterminate diastolic function.Elevated left ventricular filling pressure.    Right Ventricle: Normal right ventricular cavity size. Wall thickness is normal. Systolic function is normal.    Left Atrium: Left atrium is mildly dilated.    Right Atrium: Right atrium is mildly dilated.    Mitral Valve: There is mild to moderate regurgitation.    Pulmonic Valve: There is moderate regurgitation.    Pulmonary Artery: There is pulmonary hypertension. The estimated pulmonary artery systolic pressure is 47 mmHg.    IVC/SVC: Intermediate venous pressure at 8 mmHg.  ASSESSMENT/PLAN:     ESRD on HD TTS with Dr Loly Payne     -- HD today with UF and Tomorrow to place back on TTS           Hypokalemia   -- replace PO   -- 4 K bath     2. HTN   controlled       3. Anemia of ESRD on EPO and IV Iron outpatient   Recent Labs   Lab 09/09/24  0752   HGB 9.2*   HCT 27.7*   *       Iron   Lab Results   Component Value Date    IRON 42 (L) 06/26/2024    TIBC 303 06/26/2024    FERRITIN 674 (H) 03/14/2024       4. MBD - PTH at goal   Recent Labs   Lab 09/09/24  0752   CALCIUM 8.6*   PHOS 3.8     Recent Labs   Lab 09/09/24  0752   MG 1.7       Lab Results   Component Value Date    .6 (H) 06/26/2024    CALCIUM 8.6 (L) 09/09/2024    CAION 1.31 07/14/2020    PHOS 3.8 09/09/2024     Lab Results   Component Value Date    [id number] 9 (L) 03/14/2024      Acidosis - correct with HD   Lab Results   Component Value Date    CO2 25 09/09/2024        Nutrition/Hypoalbuminemia    Recent Labs   Lab 09/09/24  0752   ALBUMIN 3.1*     Nepro with meals TID.       Thank you for the consult, will follow  With any question please call [phone]  Nena Arriola MD    [facility]    ESPERANZA Payne MD,   MD BRANDY Arredondo MD E. V. Harmon, NP    [address]  [place], [id number] [phone]

## 2024-09-09 NOTE — ED NOTES
Spoke to Alayna AUGUSTIN) at Nephrology office, regarding consult for dialysis. Awaiting return call.

## 2024-09-09 NOTE — ASSESSMENT & PLAN NOTE
Urgently dialyzed this afternoon  BNP 1300  Trop 0.04, repeat is down to 0.038  Patient denies chest pain or shortness of breath today

## 2024-09-09 NOTE — PROCEDURES
Patient seen and examined on dialysis. Tolerating HD well  Vitals:    09/09/24 0708 09/09/24 0803 09/09/24 0904 09/09/24 1000   BP: (!) 118/59 134/65 122/60 (!) 124/57   BP Location:       Patient Position:       Pulse: 66 69 65 64   Resp: (!) 22 18 19 19   Temp: 97.7 °F (36.5 °C)      TempSrc: Oral      SpO2: 100% 98% 97% 97%   Weight:       Height:           With any question please call  [phone]  BRANDY Arriola MD    [facility]     ESPERANZA Payne MD,   OMD BRANDY Almanza MD E. V. Harmon, NP I.Goldvarg-Abud, NP    [address]  [place], [phone]

## 2024-09-09 NOTE — ED NOTES
"Pt arrived to ED with c/o SOB stating, "I think my lung collapsed." Pt endorses that he had a collapsed lung when he was in his 20's. Pt awake, alert, and oriented x 3. Pt disoriented to time stating, "it's 2026."  VS currently stable with pt on 4L O2 with nasal cannula. Denies using home oxygen. No apparent distress noted, denies chest pain. Receives dialysis on Tuesdays, Thursdays, and Saturday. Pt placed on cardiac monitor, continuous pulse oximetry and automatic blood pressure cuff. Bed placed in low locked position, side rails up x 2, call light is within reach of patient orientation to room and explanation of wait provided to patient, alarms set and turned on for monitor and pulse ox, awaiting MD evaluation and orders. Wife at bedside.    "

## 2024-09-09 NOTE — PHARMACY MED REC
"          [facility]           Pharmacy  Admission Medication History     The home medication history was taken by Cherie Asencio.      Medication history obtained from Medications listed below were obtained from: Patient/family    Based on information gathered for medication list, you may go to "Admission" then "Reconcile Home Medications" tabs to review and/or act upon those items.     The home medication list has been updated by the Pharmacy department.   Please read ALL comments highlighted in yellow.   Please address this information as you see fit.    Feel free to contact us if you have any questions or require assistance.    The medications listed below were removed from the home medication list.  Please reorder if appropriate:    Patient reports NOT TAKING the following medication(s):  Norco 5  Lidocaine 5% oint      No current facility-administered medications on file prior to encounter.     Current Outpatient Medications on File Prior to Encounter   Medication Sig Dispense Refill    amLODIPine (NORVASC) 5 MG tablet Take 1 tablet (5 mg total) by mouth once daily. 90 tablet 3    apixaban (ELIQUIS) 5 mg Tab Take 1 tablet (5 mg total) by mouth 2 (two) times daily. 180 tablet 3    carvediloL (COREG) 25 MG tablet Take 1 tablet (25 mg total) by mouth 2 (two) times daily with meals. 180 tablet 3    clopidogreL (PLAVIX) 75 mg tablet Take 1 tablet (75 mg total) by mouth once daily. 90 tablet 3    coenzyme Q10 100 mg capsule Take 100 mg by mouth once daily.      eplerenone (INSPRA) 50 MG Tab Take 1 tablet (50 mg total) by mouth once daily. 90 tablet 3    hydrALAZINE (APRESOLINE) 50 MG tablet Take 1 tablet (50 mg total) by mouth every 8 (eight) hours. 270 tablet 3    hydrOXYzine pamoate (VISTARIL) 25 MG Cap TAKE 1 CAPSULE(25 MG) BY MOUTH EVERY NIGHT AS NEEDED FOR INSOMNIA OR ANXIETY 30 capsule 0    isosorbide mononitrate (IMDUR) 30 MG 24 hr tablet Take 1 tablet (30 mg total) by mouth once daily. " 90 tablet 3    levothyroxine (SYNTHROID) 75 MCG tablet Take 1 tablet (75 mcg total) by mouth before breakfast. 30 tablet 11    nitroGLYCERIN (NITROSTAT) 0.4 MG SL tablet Place 1 tablet (0.4 mg total) under the tongue every 5 (five) minutes as needed for Chest pain (for a max of 3 tabs in 15 minutes). 25 tablet 4    ranolazine (RANEXA) 1,000 mg Tb12 Take 1 tablet (1,000 mg total) by mouth 2 (two) times daily. 180 tablet 3    rosuvastatin (CRESTOR) 40 MG Tab Take 1 tablet (40 mg total) by mouth every evening. 90 tablet 3    albuterol (PROVENTIL/VENTOLIN HFA) 90 mcg/actuation inhaler INHALE 2 PUFFS INTO THE LUNGS EVERY 6 HOURS AS NEEDED FOR WHEEZING (Patient not taking: Reported on 9/9/2024) 54 g 3    albuterol-ipratropium (DUO-NEB) 2.5 mg-0.5 mg/3 mL nebulizer solution Take 3 mLs by nebulization every 6 (six) hours as needed for Wheezing or Shortness of Breath (or cough). Rescue (Patient not taking: Reported on 9/9/2024) 75 mL 3       Please address this information as you see fit.  Feel free to contact us if you have any questions or require assistance.    Cherie Asencio  [phone]       .

## 2024-09-09 NOTE — H&P
[facility] H&P Note     Attending Physician: Yolanda Zambrano MD    Date of Admit: 9/9/2024    Chief Complaint     Shortness of Breath (Arrives via ems with worsening sob x2 days. Ems reports initial sao2-78%, increased to 90's on 4l/min NC. Denies fever. Pt. Is on dialysis t,th,sat. Mild visible dyspnea. Reports improvement of sob with oxygen. )    Assessment/Plan:     Domingo Gross is a 64 y.o. male with:    Hypoxic Resp Failure 2/2 Volume Overload  Urgently dialyzed this afternoon  BNP 1300  Trop 0.04, will repeat      Recent NSTEMI (non-ST elevated myocardial infarction)  - known hx of CAD presents with chest pain, elevated troponin  - EKG with LBBB, TWI V5-V6 (LBBB old, twi intermittently present on review of past EKG)  -[facility] 6/26  Assessment:   1.Successful angioplasty of LCX/OM with acceptable results - regaining YADIRA III flow  2.Patent stents in LAD and RCA  Plan:   1.DAPT  2.GDMT and intense statin therapy +/- repatha  3.PET stress test as an outpatient, if ISR again will need brachytherapy  4.F/u cardiology  Continue Plavix and Eliquis 5mg BID at discharge     ESRD (end stage renal disease) on dialysis  - volume overloaded  - Nephrology cosnul     Paroxysmal atrial fibrillation  Continue Eliquis     Hypothyroidism  Continue home levothyroxine     COPD (chronic obstructive pulmonary disease)  - No wheezing  - appears at baseline     Coronary artery disease involving native coronary artery of native heart with angina pectoris with documented spasm  - known CAD with prior stents (mid LAD/prox RCA PCI 3/2019, prox LCA in 10/2019)  - management as above for NSTEMI     Hyperlipidemia  - continue statin  - rosuvastatin changed to formulary alternative (atorvastatin).      HTN (hypertension)  - stable, continue home medications    DVT PPx: Eliquis     Admit to inpatient under my care    Subjective:      History of Present Illness:  Domingo Gross is a 64 y.o.  male who  has a past  medical history of Allergy, Anemia, Anticoagulant long-term use, Arthritis, CKD (chronic kidney disease) stage 4, GFR 15-29 ml/min, COPD (chronic obstructive pulmonary disease) (08/20/2021), Coronary artery disease, Heart attack (10/04/2019), Hematuria, Hemothorax, Hyperlipidemia, Hypertension, Hyperuricemia, Hypocalcemia, Hypokalemia, Hypophosphatemia, Hypothyroidism (3/2/2023), PAD (peripheral artery disease), Proteinuria, and Vitamin D deficiency.. The patient presented to [facility] on 9/9/2024 with a primary complaint of Shortness of Breath (Arrives via ems with worsening sob x2 days. Ems reports initial sao2-78%, increased to 90's on 4l/min NC. Denies fever. Pt. Is on dialysis t,th,sat. Mild visible dyspnea. Reports improvement of sob with oxygen. )    Patients reports that he was compliant with HD and medications as well as dietary restrictions and was not having any infectious symptoms prior to this am. He recently was admitted with an NSTEMI and had a [facility] with PCI performed. CXR demonstrated Interstitial predominant opacities bilaterally would be in keeping with pulmonary edema  Admitted for urgent dialysis.    Past Medical History:   Diagnosis Date    Allergy     Anemia     Anticoagulant long-term use     Arthritis     CKD (chronic kidney disease) stage 4, GFR 15-29 ml/min     COPD (chronic obstructive pulmonary disease) 08/20/2021    Coronary artery disease     Heart attack 10/04/2019    Hematuria     Hemothorax     Hyperlipidemia     Hypertension     Hyperuricemia     Hypocalcemia     Hypokalemia     Hypophosphatemia     Hypothyroidism 3/2/2023    PAD (peripheral artery disease)     Proteinuria     Vitamin D deficiency        Past Surgical History:   Procedure Laterality Date    ABDOMINAL AORTOGRAPHY N/A 5/10/2019    Procedure: AORTOGRAM-ABDOMINAL;  Surgeon: Keo Jenkins MD;  Location: [facility] CATH LAB/EP;  Service: Cardiology;  Laterality: N/A;  RSFA intervention     AORTOGRAPHY WITH  SERIALOGRAPHY N/A 12/3/2021    Procedure: AORTOGRAM, WITH SERIALOGRAPHY;  Surgeon: Keo Jenkins MD;  Location: [facility] CATH LAB/EP;  Service: Cardiology;  Laterality: N/A;    AORTOGRAPHY WITH SERIALOGRAPHY N/A 4/1/2022    Procedure: AORTOGRAM, WITH SERIALOGRAPHY;  Surgeon: Keo Jenkins MD;  Location: [facility] CATH LAB/EP;  Service: Cardiology;  Laterality: N/A;    ATHERECTOMY, CORONARY N/A 4/25/2023    Procedure: Atherectomy-coronary;  Surgeon: Keo Jenkins MD;  Location: [facility] CATH LAB/EP;  Service: Cardiology;  Laterality: N/A;    BONE MARROW BIOPSY Right 10/12/2021    Procedure: BIOPSY-BONE MARROW;  Surgeon: Naseem Woods MD;  Location: [facility] OR;  Service: Oncology;  Laterality: Right;    CARDIAC CATHETERIZATION      CATARACT EXTRACTION      CATARACT EXTRACTION W/  INTRAOCULAR LENS IMPLANT Right 6/8/2020    Procedure: EXTRACTION, CATARACT, WITH IOL INSERTION;  Surgeon: Tin Light MD;  Location: [facility] OR;  Service: Ophthalmology;  Laterality: Right;    CATARACT EXTRACTION W/  INTRAOCULAR LENS IMPLANT Left 7/2/2020    Procedure: EXTRACTION, CATARACT, WITH IOL INSERTION;  Surgeon: Tin Light MD;  Location: [facility] OR;  Service: Ophthalmology;  Laterality: Left;    COLONOSCOPY N/A 1/6/2022    Procedure: COLONOSCOPY;  Surgeon: Kathe Penaloza MD;  [address]  Service: Endoscopy;  Laterality: N/A;  COVID test at [facility] on 1/3-GT  okay to hold Plavix for 5 days and aspirin per Dr. Becerra  2nd floor due toextensive cardiac history   [facility]     CORONARY ANGIOGRAPHY N/A 3/29/2019    Procedure: ANGIOGRAM, CORONARY ARTERY;  Surgeon: Keo Jenkins MD;  Location: [facility] CATH LAB/EP;  Service: Cardiology;  Laterality: N/A;    CORONARY ANGIOGRAPHY Left 10/11/2019    Procedure: ANGIOGRAM, CORONARY ARTERY;  Surgeon: Keo Jenkins MD;  Location: [facility] CATH LAB/EP;  Service: Cardiology;  Laterality: Left;    CORONARY ANGIOGRAPHY N/A 4/10/2023    Procedure:  ANGIOGRAM, CORONARY ARTERY;  Surgeon: Keo Jenkins MD;  Location: [facility] CATH LAB/EP;  Service: Cardiology;  Laterality: N/A;    CORONARY ANGIOGRAPHY N/A 6/26/2024    Procedure: ANGIOGRAM, CORONARY ARTERY;  Surgeon: Enoch Tirado MD;  Location: [facility] CATH LAB/EP;  Service: Cardiology;  Laterality: N/A;    CORONARY ANGIOPLASTY WITH STENT PLACEMENT  03/29/2019    mid and distal RCA    ESOPHAGOGASTRODUODENOSCOPY N/A 1/6/2022    Procedure: EGD (ESOPHAGOGASTRODUODENOSCOPY);  Surgeon: Kathe Penaloza MD;  Location: [facility] ENDO ([address] [place] [address]);  Service: Endoscopy;  Laterality: N/A;    EYE SURGERY      INSERTION OF TUNNELED CENTRAL VENOUS HEMODIALYSIS CATHETER N/A 2/9/2024    Procedure: INSERTION, CATHETER, HEMODIALYSIS, DUAL LUMEN;  Surgeon: Wang Mendieta MD;  Location: [facility] OR;  Service: General;  Laterality: N/A;    INSTANTANEOUS WAVE-FREE RATIO (IFR) N/A 4/25/2023    Procedure: Instantaneous Wave-Free Ratio (IFR);  Surgeon: Keo Jenkins MD;  Location: [facility] CATH LAB/EP;  Service: Cardiology;  Laterality: N/A;    INTRAVASCULAR ULTRASOUND, NON-CORONARY  8/12/2022    Procedure: Intravascular Ultrasound, Non-Coronary;  Surgeon: Keo Jenkins MD;  Location: [facility] CATH LAB/EP;  Service: Cardiology;;    IVUS, CORONARY  4/25/2023    Procedure: IVUS, Coronary;  Surgeon: Keo Jenkins MD;  Location: [facility] CATH LAB/EP;  Service: Cardiology;;    IVUS, CORONARY  5/16/2023    Procedure: IVUS, Coronary;  Surgeon: Keo Jenkins MD;  Location: [facility] CATH LAB/EP;  Service: Cardiology;;  CX    LEFT HEART CATHETERIZATION N/A 3/29/2019    Procedure: Left heart cath;  Surgeon: Keo Jenkins MD;  Location: [facility] CATH LAB/EP;  Service: Cardiology;  Laterality: N/A;    LEFT HEART CATHETERIZATION Left 10/8/2019    Procedure: Left heart cath;  Surgeon: Keo Jenkins MD;  Location: [facility] CATH LAB/EP;  Service: Cardiology;  Laterality: Left;    LEFT HEART CATHETERIZATION Left 4/10/2023    Procedure: Left heart cath;  Surgeon: Keo KYLE  MD Gregory;  Location: [facility] CATH LAB/EP;  Service: Cardiology;  Laterality: Left;    LEFT HEART CATHETERIZATION Left 4/25/2023    Procedure: Left heart cath;  Surgeon: Keo Jenkins MD;  Location: [facility] CATH LAB/EP;  Service: Cardiology;  Laterality: Left;    LEFT HEART CATHETERIZATION Left 5/16/2023    Procedure: Left heart cath;  Surgeon: Keo Jenkins MD;  Location: [facility] CATH LAB/EP;  Service: Cardiology;  Laterality: Left;    LEFT HEART CATHETERIZATION Left 6/26/2024    Procedure: Left heart cath;  Surgeon: Enoch Tirado MD;  Location: [facility] CATH LAB/EP;  Service: Cardiology;  Laterality: Left;    PERCUTANEOUS CORONARY INTERVENTION, ARTERY N/A 6/26/2024    Procedure: Percutaneous coronary intervention;  Surgeon: Enoch Tirado MD;  Location: [facility] CATH LAB/EP;  Service: Cardiology;  Laterality: N/A;    PERCUTANEOUS TRANSLUMINAL ANGIOPLASTY (PTA) OF PERIPHERAL VESSEL N/A 7/12/2019    Procedure: PTA, PERIPHERAL VESSEL;  Surgeon: Keo Jenkins MD;  Location: [facility] CATH LAB/EP;  Service: Cardiology;  Laterality: N/A;    PERCUTANEOUS TRANSLUMINAL ANGIOPLASTY (PTA) OF PERIPHERAL VESSEL Left 5/20/2022    Procedure: PTA, PERIPHERAL VESSEL;  Surgeon: Keo Jenkins MD;  Location: [facility] CATH LAB/EP;  Service: Cardiology;  Laterality: Left;    PERCUTANEOUS TRANSLUMINAL ANGIOPLASTY (PTA) OF PERIPHERAL VESSEL Right 8/12/2022    Procedure: PTA, PERIPHERAL VESSEL;  Surgeon: Keo Jenkins MD;  Location: [facility] CATH LAB/EP;  Service: Cardiology;  Laterality: Right;    PERCUTANEOUS TRANSLUMINAL BALLOON ANGIOPLASTY OF CORONARY ARTERY  4/25/2023    Procedure: Angioplasty-coronary;  Surgeon: Keo Jenkins MD;  Location: [facility] CATH LAB/EP;  Service: Cardiology;;    PLACEMENT OF ARTERIOVENOUS GRAFT Left 4/15/2024    Procedure: INSERTION, GRAFT, ARTERIOVENOUS;  Surgeon: Scott Pascual MD;  Location: [facility] OR;  Service: General;  Laterality: Left;    PTCA, SINGLE VESSEL  5/16/2023    Procedure: PTCA, Single  Vessel;  Surgeon: Keo Jenkins MD;  Location: [facility] CATH LAB/EP;  Service: Cardiology;;  CX    PTCA, SINGLE VESSEL  6/26/2024    Procedure: PTCA, Single Vessel;  Surgeon: Enoch Tirado MD;  Location: [facility] CATH LAB/EP;  Service: Cardiology;;    REMOVAL OF HEMODIALYSIS CATHETER Right 2/9/2024    Procedure: REMOVAL, CATHETER, HEMODIALYSIS;  Surgeon: Wang Mendieta MD;  Location: [facility] OR;  Service: General;  Laterality: Right;    REMOVAL OF TUNNELED CENTRAL VENOUS CATHETER (CVC) Right 5/20/2024    Procedure: REMOVAL, CATHETER, CENTRAL VENOUS, TUNNELED;  Surgeon: Scott Pascual MD;  Location: [facility] OR;  Service: General;  Laterality: Right;    REPAIR, CHRONIC TOTAL OCCLUSION, CORONARY  6/26/2024    Procedure: Repair, Chronic Total Occlusion, Coronary;  Surgeon: Enoch Tirado MD;  Location: [facility] CATH LAB/EP;  Service: Cardiology;;    STENT, DRUG ELUTING, SINGLE VESSEL, CORONARY  4/25/2023    Procedure: Stent, Drug Eluting, Single Vessel, Coronary;  Surgeon: Keo Jenkins MD;  Location: [facility] CATH LAB/EP;  Service: Cardiology;;    STENT, DRUG ELUTING, SINGLE VESSEL, CORONARY  5/16/2023    Procedure: Stent, Drug Eluting, Single Vessel, Coronary;  Surgeon: Keo Jenkins MD;  Location: [facility] CATH LAB/EP;  Service: Cardiology;;  CX    TRANSESOPHAGEAL ECHOCARDIOGRAM WITH POSSIBLE CARDIOVERSION (JOSE W/ POSS CARDIOVERSION) N/A 6/19/2023    Procedure: Transesophageal echo (JOSE) intra-procedure log documentation;  Surgeon: Keo Jenkins MD;  Location: [facility] CATH LAB/EP;  Service: Cardiology;  Laterality: N/A;       Allergies:  Review of patient's allergies indicates:   Allergen Reactions    Ace inhibitors Rash       Home Medications:  Prior to Admission medications    Medication Sig Start Date End Date Taking? Authorizing Provider   amLODIPine (NORVASC) 5 MG tablet Take 1 tablet (5 mg total) by mouth once daily. 6/7/24 6/7/25 Yes Vishnu Bolton, JEANINE   apixaban (ELIQUIS) 5 mg Tab Take 1 tablet (5 mg  total) by mouth 2 (two) times daily. 6/7/24  Yes Vishnu Bolton DNP   carvediloL (COREG) 25 MG tablet Take 1 tablet (25 mg total) by mouth 2 (two) times daily with meals. 6/7/24 6/7/25 Yes Vishnu Bolton DNP   clopidogreL (PLAVIX) 75 mg tablet Take 1 tablet (75 mg total) by mouth once daily. 6/7/24  Yes Vishnu Bolton DNP   coenzyme Q10 100 mg capsule Take 100 mg by mouth once daily.   Yes Ajay, Paula   eplerenone (INSPRA) 50 MG Tab Take 1 tablet (50 mg total) by mouth once daily. 3/1/24  Yes Enoch Tirado MD   hydrALAZINE (APRESOLINE) 50 MG tablet Take 1 tablet (50 mg total) by mouth every 8 (eight) hours. 6/7/24 6/7/25 Yes Vishnu Bolton DNP   hydrOXYzine pamoate (VISTARIL) 25 MG Cap TAKE 1 CAPSULE(25 MG) BY MOUTH EVERY NIGHT AS NEEDED FOR INSOMNIA OR ANXIETY 5/27/24  Yes Analy Encarnacion MD   isosorbide mononitrate (IMDUR) 30 MG 24 hr tablet Take 1 tablet (30 mg total) by mouth once daily. 7/23/24 7/23/25 Yes Enoch Tirado MD   levothyroxine (SYNTHROID) 75 MCG tablet Take 1 tablet (75 mcg total) by mouth before breakfast. 2/10/24 2/9/25 Yes Jude Sanford MD   nitroGLYCERIN (NITROSTAT) 0.4 MG SL tablet Place 1 tablet (0.4 mg total) under the tongue every 5 (five) minutes as needed for Chest pain (for a max of 3 tabs in 15 minutes). 6/28/24 6/28/25 Yes Yolanda Zambrano MD   ranolazine (RANEXA) 1,000 mg Tb12 Take 1 tablet (1,000 mg total) by mouth 2 (two) times daily. 7/10/24 7/10/25 Yes Enoch Tirado MD   rosuvastatin (CRESTOR) 40 MG Tab Take 1 tablet (40 mg total) by mouth every evening. 6/7/24  Yes Vishnu Bolton DNP   albuterol (PROVENTIL/VENTOLIN HFA) 90 mcg/actuation inhaler INHALE 2 PUFFS INTO THE LUNGS EVERY 6 HOURS AS NEEDED FOR WHEEZING  Patient not taking: Reported on 9/9/2024 10/9/23   Analy Encarnacion MD   albuterol-ipratropium (DUO-NEB) 2.5 mg-0.5 mg/3 mL nebulizer solution Take 3 mLs by nebulization every 6 (six) hours as needed for Wheezing or  Shortness of Breath (or cough). Rescue  Patient not taking: Reported on 2024 10/4/23 10/3/24  Analy Encarnacion MD   HYDROcodone-acetaminophen (NORCO) 5-325 mg per tablet Take 1 tablet by mouth every 6 (six) hours as needed for Pain. 4/15/24 9/9/24  Scott Pascual MD   LIDOcaine (XYLOCAINE) 5 % Oint ointment Apply topically as needed. 24  Provider, Paula   nitroGLYCERIN (NITROSTAT) 0.4 MG SL tablet Place 1 tablet (0.4 mg total) under the tongue every 5 (five) minutes as needed for Chest pain. 3/1/24 9/9/24  Enoch Tirado MD     Family History   Problem Relation Name Age of Onset    Aneurysm Mother      Hypertension Mother      Heart disease Mother      Cancer Father      Diabetes Sister 1     Hypertension Sister 1     No Known Problems Brother 1     No Known Problems Son 1      Social History     Tobacco Use    Smoking status: Former     Current packs/day: 0.00     Types: Cigarettes     Quit date: 1999     Years since quittin.4    Smokeless tobacco: Never   Substance Use Topics    Alcohol use: No     Alcohol/week: 0.0 standard drinks of alcohol    Drug use: No       Review of Systems   Constitutional:  Negative for chills and fever.   HENT:  Negative for congestion and sore throat.    Respiratory:  Positive for shortness of breath. Negative for sputum production.    Cardiovascular:  Positive for leg swelling.   Gastrointestinal:  Negative for abdominal pain, nausea and vomiting.   Genitourinary:  Negative for dysuria, frequency and urgency.   Musculoskeletal:  Negative for myalgias and neck pain.   Neurological:  Negative for dizziness and tingling.   Psychiatric/Behavioral:  Negative for depression.           Objective:   Last 24 Hour Vital Signs:  BP  Min: 118/59  Max: 140/62  Temp  Av.8 °F (36.6 °C)  Min: 97.7 °F (36.5 °C)  Max: 97.9 °F (36.6 °C)  Pulse  Av.7  Min: 63  Max: 73  Resp  Av.2  Min: 18  Max: 24  SpO2  Av.3 %  Min: 97 %  Max: 100  "%  Height  Av' 10" (177.8 cm)  Min: 5' 10" (177.8 cm)  Max: 5' 10" (177.8 cm)  Weight  Av.6 kg (180 lb)  Min: 81.6 kg (180 lb)  Max: 81.6 kg (180 lb)  Body mass index is 25.83 kg/m².  No intake/output data recorded.    Physical Exam  Constitutional:       Appearance: Normal appearance. He is ill-appearing.   HENT:      Head: Normocephalic and atraumatic.   Cardiovascular:      Rate and Rhythm: Normal rate. Rhythm irregular.   Pulmonary:      Effort: Pulmonary effort is normal. No respiratory distress.      Breath sounds: Normal breath sounds.   Abdominal:      General: Abdomen is flat. Bowel sounds are normal. There is no distension.      Palpations: Abdomen is soft.   Musculoskeletal:      Right lower leg: Edema present.      Left lower leg: Edema present.   Skin:     General: Skin is warm and dry.      Coloration: Skin is not jaundiced.   Neurological:      General: No focal deficit present.      Mental Status: He is alert and oriented to person, place, and time.         Laboratory:  Most Recent Data:  CBC:   Lab Results   Component Value Date    WBC 5.96 2024    HGB 9.2 (L) 2024    HCT 27.7 (L) 2024     (L) 2024    MCV 88 2024    RDW 15.6 (H) 2024       BMP:   Lab Results   Component Value Date     (L) 2024    K 3.1 (L) 2024     2024    CO2 25 2024    BUN 21 2024    CREATININE 3.9 (H) 2024    GLU 95 2024    CALCIUM 8.6 (L) 2024    MG 1.7 2024    PHOS 3.8 2024     LFTs:   Lab Results   Component Value Date    PROT 6.6 2024    ALBUMIN 3.1 (L) 2024    BILITOT 0.9 2024    AST 23 2024    ALKPHOS 56 2024    ALT 8 (L) 2024     Coags:   Lab Results   Component Value Date    INR 1.3 (H) 2024     FLP:   Lab Results   Component Value Date    CHOL 129 2024    HDL 63 2024    LDLCALC 55.2 (L) 2024    TRIG 54 2024    CHOLHDL 48.8 2024 "     DM:   Lab Results   Component Value Date    HGBA1C 4.6 06/25/2024    HGBA1C 4.6 06/06/2024    HGBA1C 5.2 02/27/2023    GLUF 102 11/25/2019    LDLCALC 55.2 (L) 06/25/2024    CREATININE 3.9 (H) 09/09/2024     Thyroid:   Lab Results   Component Value Date    TSH 1.111 06/06/2024    FREET4 0.92 01/27/2024     Anemia:   Lab Results   Component Value Date    IRON 42 (L) 06/26/2024    TIBC 303 06/26/2024    FERRITIN 674 (H) 03/14/2024    CESVLQNZ24 289 03/14/2023    FOLATE 5.5 03/14/2023     Cardiac:   Lab Results   Component Value Date    TROPONINI 0.040 (H) 09/09/2024    BNP 1,303 (H) 09/09/2024     Urinalysis:   Lab Results   Component Value Date    LABURIN No growth 02/13/2024    COLORU Yellow 03/15/2024    SPECGRAV 1.015 03/15/2024    NITRITE Negative 03/15/2024    KETONESU Negative 03/15/2024    UROBILINOGEN Negative 03/15/2024    WBCUA 3 03/15/2024       Trended Lab Data:  Recent Labs   Lab 09/09/24  0752   WBC 5.96   HGB 9.2*   HCT 27.7*   *   MCV 88   RDW 15.6*   *   K 3.1*      CO2 25   BUN 21   CREATININE 3.9*   GLU 95   PROT 6.6   ALBUMIN 3.1*   BILITOT 0.9   AST 23   ALKPHOS 56   ALT 8*       Trended Cardiac Data:  Recent Labs   Lab 09/09/24  0752   TROPONINI 0.040*   BNP 1,303*       Microbiology Data:      Other Results:  EKG (my interpretation): normal sinus rhythm, nonspecific ST and T waves changes, LBBB, prolonged QT interval.    Radiology:  Imaging Results              X-Ray Chest 1 View (Final result)  Result time 09/09/24 08:13:19      Final result by Nato Bacon MD (09/09/24 08:13:19)                   Impression:      Interstitial predominant opacities bilaterally would be in keeping with pulmonary edema, noting that infectious or noninfectious inflammatory etiology could appear similar.    At least small bilateral pleural effusions.      Electronically signed by: Nato Bacon  Date:    09/09/2024  Time:    08:13               Narrative:    EXAMINATION:  XR CHEST 1  VIEW    CLINICAL HISTORY:  ESRD;    TECHNIQUE:  Single frontal view of the chest was performed.    COMPARISON:  Chest radiograph performed 02/15/2024, 10:28 hours.    FINDINGS:  Monitoring leads overlie the chest.  Grossly unchanged cardiac contours.    Atherosclerosis of the aorta.  Post treatment sequela again project in the chest.    Patchy interstitial predominant opacities bilaterally.    Suspect at least small bilateral pleural effusions.    No definite pneumothorax.    No acute findings in the visualized abdomen    Osseous and soft tissue structures appear without definite acute change.                                            Code Status:     Full    Yolanda Zambrano MD  [facility]

## 2024-09-09 NOTE — ED NOTES
Pt sitting up and resting comfortably in bed. Chest rising and falling. Denies chest pain and other needs at this time. Call light within reach.

## 2024-09-09 NOTE — HPI
Domingo Gross is a 64 y.o.  male who  has a past medical history of Allergy, Anemia, Anticoagulant long-term use, Arthritis, CKD (chronic kidney disease) stage 4, GFR 15-29 ml/min, COPD (chronic obstructive pulmonary disease) (08/20/2021), Coronary artery disease, Heart attack (10/04/2019), Hematuria, Hemothorax, Hyperlipidemia, Hypertension, Hyperuricemia, Hypocalcemia, Hypokalemia, Hypophosphatemia, Hypothyroidism (3/2/2023), PAD (peripheral artery disease), Proteinuria, and Vitamin D deficiency.. The patient presented to [facility] on 9/9/2024 with a primary complaint of Shortness of Breath (Arrives via ems with worsening sob x2 days. Ems reports initial sao2-78%, increased to 90's on 4l/min NC. Denies fever. Pt. Is on dialysis t,th,sat. Mild visible dyspnea. Reports improvement of sob with oxygen. )     Patients reports that he was compliant with HD and medications as well as dietary restrictions and was not having any infectious symptoms prior to this am. He recently was admitted with an NSTEMI and had a C with PCI performed. CXR demonstrated Interstitial predominant opacities bilaterally would be in keeping with pulmonary edema  Admitted for urgent dialysis.

## 2024-09-10 VITALS
TEMPERATURE: 98 F | SYSTOLIC BLOOD PRESSURE: 124 MMHG | WEIGHT: 229.5 LBS | OXYGEN SATURATION: 96 % | DIASTOLIC BLOOD PRESSURE: 69 MMHG | HEIGHT: 70 IN | HEART RATE: 74 BPM | BODY MASS INDEX: 32.86 KG/M2 | RESPIRATION RATE: 16 BRPM

## 2024-09-10 LAB
ALBUMIN SERPL BCP-MCNC: 2.7 G/DL (ref 3.5–5.2)
ANION GAP SERPL CALC-SCNC: 11 MMOL/L (ref 8–16)
BASOPHILS # BLD AUTO: 0.03 K/UL (ref 0–0.2)
BASOPHILS NFR BLD: 0.7 % (ref 0–1.9)
BUN SERPL-MCNC: 18 MG/DL (ref 8–23)
CALCIUM SERPL-MCNC: 8.6 MG/DL (ref 8.7–10.5)
CHLORIDE SERPL-SCNC: 103 MMOL/L (ref 95–110)
CO2 SERPL-SCNC: 25 MMOL/L (ref 23–29)
CREAT SERPL-MCNC: 3.4 MG/DL (ref 0.5–1.4)
DIFFERENTIAL METHOD BLD: ABNORMAL
EOSINOPHIL # BLD AUTO: 0.1 K/UL (ref 0–0.5)
EOSINOPHIL NFR BLD: 2.3 % (ref 0–8)
ERYTHROCYTE [DISTWIDTH] IN BLOOD BY AUTOMATED COUNT: 15.5 % (ref 11.5–14.5)
EST. GFR  (NO RACE VARIABLE): 19 ML/MIN/1.73 M^2
GLUCOSE SERPL-MCNC: 93 MG/DL (ref 70–110)
HCT VFR BLD AUTO: 27.2 % (ref 40–54)
HGB BLD-MCNC: 8.9 G/DL (ref 14–18)
IMM GRANULOCYTES # BLD AUTO: 0.01 K/UL (ref 0–0.04)
IMM GRANULOCYTES NFR BLD AUTO: 0.2 % (ref 0–0.5)
LYMPHOCYTES # BLD AUTO: 0.9 K/UL (ref 1–4.8)
LYMPHOCYTES NFR BLD: 20.6 % (ref 18–48)
MCH RBC QN AUTO: 28.9 PG (ref 27–31)
MCHC RBC AUTO-ENTMCNC: 32.7 G/DL (ref 32–36)
MCV RBC AUTO: 88 FL (ref 82–98)
MONOCYTES # BLD AUTO: 0.5 K/UL (ref 0.3–1)
MONOCYTES NFR BLD: 10.9 % (ref 4–15)
NEUTROPHILS # BLD AUTO: 2.8 K/UL (ref 1.8–7.7)
NEUTROPHILS NFR BLD: 65.3 % (ref 38–73)
NRBC BLD-RTO: 0 /100 WBC
OHS QRS DURATION: 152 MS
OHS QTC CALCULATION: 497 MS
PHOSPHATE SERPL-MCNC: 3.1 MG/DL (ref 2.7–4.5)
PLATELET # BLD AUTO: 86 K/UL (ref 150–450)
PMV BLD AUTO: 10.6 FL (ref 9.2–12.9)
POTASSIUM SERPL-SCNC: 3.3 MMOL/L (ref 3.5–5.1)
RBC # BLD AUTO: 3.08 M/UL (ref 4.6–6.2)
SODIUM SERPL-SCNC: 139 MMOL/L (ref 136–145)
WBC # BLD AUTO: 4.32 K/UL (ref 3.9–12.7)

## 2024-09-10 PROCEDURE — 85025 COMPLETE CBC W/AUTO DIFF WBC: CPT | Performed by: INTERNAL MEDICINE

## 2024-09-10 PROCEDURE — 90935 HEMODIALYSIS ONE EVALUATION: CPT

## 2024-09-10 PROCEDURE — 36415 COLL VENOUS BLD VENIPUNCTURE: CPT | Performed by: INTERNAL MEDICINE

## 2024-09-10 PROCEDURE — 80069 RENAL FUNCTION PANEL: CPT | Performed by: INTERNAL MEDICINE

## 2024-09-10 PROCEDURE — 25000003 PHARM REV CODE 250: Performed by: INTERNAL MEDICINE

## 2024-09-10 RX ORDER — POTASSIUM CHLORIDE 20 MEQ/1
20 TABLET, EXTENDED RELEASE ORAL ONCE
Status: DISCONTINUED | OUTPATIENT
Start: 2024-09-10 | End: 2024-09-10 | Stop reason: HOSPADM

## 2024-09-10 RX ADMIN — LEVOTHYROXINE SODIUM 75 MCG: 75 TABLET ORAL at 05:09

## 2024-09-10 RX ADMIN — HYDRALAZINE HYDROCHLORIDE 50 MG: 25 TABLET ORAL at 05:09

## 2024-09-10 NOTE — PROCEDURES
Patient seen and examined on dialysis. Tolerating HD well  Vitals:    09/10/24 0930 09/10/24 1000 09/10/24 1030 09/10/24 1100   BP: (!) 120/49 126/65 124/69 120/67   BP Location:       Patient Position:       Pulse: 74 72 73 73   Resp:       Temp:       TempSrc:       SpO2:       Weight:       Height:           With any question please call  [phone]  BRANDY Arriola MD    [facility]     ESPERANZA Payne MD,   MD BRANDY Arredondo MD E. V. Harmon, NP I.Goldvarg-Abud, NP    [address]  [place], [id number]

## 2024-09-10 NOTE — NURSING
PT WITH DC TO HOME ORDERS, PIV DCED, PRESSURE DRSG APPLIED, JOSEP WELL, DC INSTRUCTIONS REVIEWED WITH PT, VERB + UNDERSTANDING, WILL NOTIFY TRANSPORT.

## 2024-09-10 NOTE — PLAN OF CARE
Problem: Hemodialysis  Goal: Safe, Effective Therapy Delivery  Outcome: Met  Goal: Effective Tissue Perfusion  Outcome: Met  Goal: Absence of Infection Signs and Symptoms  Outcome: Met     Problem: Adult Inpatient Plan of Care  Goal: Plan of Care Review  Outcome: Met  Goal: Patient-Specific Goal (Individualized)  Outcome: Met  Goal: Absence of Hospital-Acquired Illness or Injury  Outcome: Met  Goal: Optimal Comfort and Wellbeing  Outcome: Met  Goal: Readiness for Transition of Care  Outcome: Met     Problem: Wound  Goal: Optimal Coping  Outcome: Met  Goal: Optimal Functional Ability  Outcome: Met  Goal: Absence of Infection Signs and Symptoms  Outcome: Met  Goal: Improved Oral Intake  Outcome: Met  Goal: Optimal Pain Control and Function  Outcome: Met  Goal: Skin Health and Integrity  Outcome: Met  Goal: Optimal Wound Healing  Outcome: Met     Problem: Activity Intolerance  Goal: Enhanced Capacity and Energy  Outcome: Met     Problem: Chronic Kidney Disease  Goal: Optimal Coping with Chronic Illness  Outcome: Met  Goal: Electrolyte Balance  Outcome: Met  Goal: Fluid Balance  Outcome: Met  Goal: Optimal Functional Ability  Outcome: Met  Goal: Absence of Anemia Signs and Symptoms  Outcome: Met  Goal: Optimal Oral Intake  Outcome: Met  Goal: Acceptable Pain Control  Outcome: Met  Goal: Minimize Renal Failure Effects  Outcome: Met

## 2024-09-10 NOTE — HOSPITAL COURSE
Patient was admitted for worsening shortness of breath in the setting of fluid overload, he received emergency HD yesterday  And he got another session of HD today, patient feels much better today with no respiratory symptoms, laying down in bed flat

## 2024-09-10 NOTE — DISCHARGE SUMMARY
[facility] Medicine  Discharge Summary      Patient Name: Domingo Gross  MRN: [id number]  NAOMI: [id number]  Patient Class: IP- Inpatient  Admission Date: 9/9/2024  Hospital Length of Stay: 1 days  Discharge Date and Time:  09/10/2024 3:06 PM  Attending Physician: Nima Ervin MD   Discharging Provider: Nima Ervin MD  Primary Care Provider: Perez Bell DO    Primary Care Team: Networked reference to record PCT     HPI:   Domingo Gross is a 64 y.o.  male who  has a past medical history of Allergy, Anemia, Anticoagulant long-term use, Arthritis, CKD (chronic kidney disease) stage 4, GFR 15-29 ml/min, COPD (chronic obstructive pulmonary disease) (08/20/2021), Coronary artery disease, Heart attack (10/04/2019), Hematuria, Hemothorax, Hyperlipidemia, Hypertension, Hyperuricemia, Hypocalcemia, Hypokalemia, Hypophosphatemia, Hypothyroidism (3/2/2023), PAD (peripheral artery disease), Proteinuria, and Vitamin D deficiency.. The patient presented to [facility] on 9/9/2024 with a primary complaint of Shortness of Breath (Arrives via ems with worsening sob x2 days. Ems reports initial sao2-78%, increased to 90's on 4l/min NC. Denies fever. Pt. Is on dialysis t,th,sat. Mild visible dyspnea. Reports improvement of sob with oxygen. )     Patients reports that he was compliant with HD and medications as well as dietary restrictions and was not having any infectious symptoms prior to this am. He recently was admitted with an NSTEMI and had a C with PCI performed. CXR demonstrated Interstitial predominant opacities bilaterally would be in keeping with pulmonary edema  Admitted for urgent dialysis.    * No surgery found *      Hospital Course:   Patient was admitted for worsening shortness of breath in the setting of fluid overload, he received emergency HD yesterday  And he got another session of HD today, patient feels much better today with no respiratory symptoms,  laying down in bed flat       Goals of Care Treatment Preferences:  Code Status: Full Code         Consults:   Consults (From admission, onward)          Status Ordering Provider     Inpatient consult to [facility] ([facility] Nimkevych)  Once        Provider:  (Not yet assigned)    Acknowledged EDA AVILA            Pulmonary  Pulmonary edema  Urgently dialyzed this afternoon  BNP 1300  Trop 0.04, repeat is down to 0.038  Patient denies chest pain or shortness of breath today           Final Active Diagnoses:    Diagnosis Date Noted POA    Pulmonary edema [J81.1] 09/09/2024 Unknown    ESRD (end stage renal disease) on dialysis [N18.6, Z99.2] 02/16/2024 Not Applicable    Paroxysmal atrial fibrillation [I48.0] 08/24/2023 Yes    Hypothyroidism [E03.9] 03/02/2023 Yes    COPD (chronic obstructive pulmonary disease) [J44.9] 08/20/2021 Yes    Coronary artery disease involving native coronary artery of native heart with angina pectoris with documented spasm [I25.111] 03/29/2019 Yes      Problems Resolved During this Admission:       Discharged Condition: good    Disposition:     Follow Up:    Patient Instructions:   No discharge procedures on file.    Significant Diagnostic Studies: Labs: CMP   Recent Labs   Lab 09/09/24  0752 09/10/24  0336   * 139   K 3.1* 3.3*    103   CO2 25 25   GLU 95 93   BUN 21 18   CREATININE 3.9* 3.4*   CALCIUM 8.6* 8.6*   PROT 6.6  --    ALBUMIN 3.1* 2.7*   BILITOT 0.9  --    ALKPHOS 56  --    AST 23  --    ALT 8*  --    ANIONGAP 10 11    and CBC   Recent Labs   Lab 09/09/24  0752 09/10/24  0336   WBC 5.96 4.32   HGB 9.2* 8.9*   HCT 27.7* 27.2*   * 86*       Pending Diagnostic Studies:       Procedure Component Value Units Date/Time    Hepatitis B Surface Antibody, Qual/Quant [[id number]] Collected: 09/09/24 1226    Order Status: Sent Lab Status: In process Updated: 09/09/24 1955    Specimen: Blood            Medications:  Reconciled Home  Medications:      Medication List        CONTINUE taking these medications      amLODIPine 5 MG tablet  Commonly known as: NORVASC  Take 1 tablet (5 mg total) by mouth once daily.     apixaban 5 mg Tab  Commonly known as: ELIQUIS  Take 1 tablet (5 mg total) by mouth 2 (two) times daily.     carvediloL 25 MG tablet  Commonly known as: COREG  Take 1 tablet (25 mg total) by mouth 2 (two) times daily with meals.     clopidogreL 75 mg tablet  Commonly known as: PLAVIX  Take 1 tablet (75 mg total) by mouth once daily.     coenzyme Q10 100 mg capsule  Take 100 mg by mouth once daily.     eplerenone 50 MG Tab  Commonly known as: INSPRA  Take 1 tablet (50 mg total) by mouth once daily.     hydrALAZINE 50 MG tablet  Commonly known as: APRESOLINE  Take 1 tablet (50 mg total) by mouth every 8 (eight) hours.     hydrOXYzine pamoate 25 MG Cap  Commonly known as: VISTARIL  TAKE 1 CAPSULE(25 MG) BY MOUTH EVERY NIGHT AS NEEDED FOR INSOMNIA OR ANXIETY     isosorbide mononitrate 30 MG 24 hr tablet  Commonly known as: IMDUR  Take 1 tablet (30 mg total) by mouth once daily.     levothyroxine 75 MCG tablet  Commonly known as: SYNTHROID  Take 1 tablet (75 mcg total) by mouth before breakfast.     nitroGLYCERIN 0.4 MG SL tablet  Commonly known as: NITROSTAT  Place 1 tablet (0.4 mg total) under the tongue every 5 (five) minutes as needed for Chest pain (for a max of 3 tabs in 15 minutes).     ranolazine 1,000 mg Tb12  Commonly known as: RANEXA  Take 1 tablet (1,000 mg total) by mouth 2 (two) times daily.     rosuvastatin 40 MG Tab  Commonly known as: CRESTOR  Take 1 tablet (40 mg total) by mouth every evening.            ASK your doctor about these medications      albuterol 90 mcg/actuation inhaler  Commonly known as: PROVENTIL/VENTOLIN HFA  INHALE 2 PUFFS INTO THE LUNGS EVERY 6 HOURS AS NEEDED FOR WHEEZING     albuterol-ipratropium 2.5 mg-0.5 mg/3 mL nebulizer solution  Commonly known as: DUO-NEB  Take 3 mLs by nebulization every 6 (six)  hours as needed for Wheezing or Shortness of Breath (or cough). Rescue              Indwelling Lines/Drains at time of discharge:   Lines/Drains/Airways       Drain  Duration                  Hemodialysis AV Fistula Left upper arm -- days                    Time spent on the discharge of patient: 35 minutes         Nima Ervin MD  Department of Hospital Medicine  [facility]

## 2024-09-10 NOTE — PROGRESS NOTES
VIRTUAL NURSE:  Cued into patient's room.  Permission received per patient to turn camera to view patient.  Introduced as VN that will be working with floor nurse and nursing assistant.  Educated patient on VN's role in patient care and  VIP model.  Plan of care reviewed with patient.  Education per flowsheet.   Informed patient that staff will round on them every 2 hours but to use call light for any other needs they may have; informed of fall risk and fall precautions.  Patient verbalized understanding.  Call light within reach; bed siderails up x3.  Opportunity given for questions and questions answered.  Admission assessment questions answered.  Patient denies complaints or any needs at this time. Instructed to call for assistance.  Will cont to monitor and intervene as needed.    Labs, notes, orders, and careplan reviewed.    09/09/24 1911   Admission   Initial VN Admission Questions Complete   Communication Issues? None   Shift   Virtual Nurse - Rounding Complete   Pain Management Interventions relaxation techniques promoted;quiet environment facilitated   Virtual Nurse - Patient Verbalized Approval Of Camera Use;VN Rounding   Type of Frequent Check   Type Patient Rounds   Safety/Activity   Patient Rounds bed in low position;bed wheels locked;call light in patient/parent reach;clutter free environment maintained;ID band on;visualized patient;placement of personal items at bedside   Activity Assistance Provided independent   Positioning   Body Position sitting up in bed   Head of Bed (HOB) Positioning HOB at 30-45 degrees

## 2024-09-10 NOTE — PROGRESS NOTES
09/10/24 1159   Vital Signs   Temp 98 °F (36.7 °C)   Pulse 74   /69   Post-Hemodialysis Assessment   Rinseback Volume (mL) 250 mL   Blood Volume Processed (Liters) 74.3 L   Dialyzer Clearance Clear   Duration of Treatment 180 minutes   Total UF (mL) 3000 mL   Net Fluid Removal 2500   Patient Response to Treatment tolerated well   Post-Hemodialysis Comments needles pulled x2 pressure held till hemostasis achieved no bleeding noted at sites new dressing applied to each site

## 2024-09-10 NOTE — PLAN OF CARE
09/10/24 1005   Rounds   Attendance Provider;Nurse    Discharge Plan A Home with family       1005  CM rounded on the pt with Dr Ervin while the pt was receiving his HD session in the HD unit. MD informed the pt that he is medically stable to discharge home after HD. Pt verbalized understanding & agreement.       [facility] - Telemetry  Initial Discharge Assessment       Primary Care Provider: Perez Bell DO    Admission Diagnosis: ESRD (end stage renal disease) [N18.6]  Chest pain [R07.9]  Hypervolemia, unspecified hypervolemia type [E87.70]    Admission Date: 9/9/2024  Expected Discharge Date: 9/10/2024    Consult: neph    Payor: BLUE CROSS BLUE SHIELD / Plan: BCBS ALL OUT OF STATE / Product Type: PPO /     Extended Emergency Contact Information  Primary Emergency Contact: Karmen Gross   [place]  Mobile Phone: [phone]  Relation: Spouse    Discharge Plan A: (P) Home with family  Discharge Plan B: (P) Home Health      [id number] - [place] - [address] AT [facility] [address]  [address]  [place] [address]  Phone: [phone] Fax: [phone]    [facility]  [address]  [place] [address]  Phone: [phone] Fax: [phone]      Initial Assessment (most recent)       Adult Discharge Assessment - 09/10/24 1150          Discharge Assessment    Assessment Type Discharge Planning Assessment (P)      Confirmed/corrected address, phone number and insurance Yes (P)      Confirmed Demographics Correct on Facesheet (P)      Source of Information patient (P)      Communicated FLACO with patient/caregiver Yes (P)      People in Home spouse;grandchild(guillermina) (P)    spouse, Karmen Gross ([phone]), & 20 y.o. grandson w/ADHD    Do you expect to return to your current living situation? Yes (P)      Do you have help at home or someone to help you manage your care at home? Yes (P)      Prior to  hospitilization cognitive status: Alert/Oriented (P)      Current cognitive status: Alert/Oriented (P)      Equipment Currently Used at Home cane, straight;blood pressure machine;rollator (P)      Readmission within 30 days? No (P)      Patient currently being followed by outpatient case management? No (P)      Do you currently have service(s) that help you manage your care at home? No (P)      Do you take prescription medications? Yes (P)      Do you have prescription coverage? Yes (P)      Do you have any problems affording any of your prescribed medications? No (P)      Is the patient taking medications as prescribed? yes (P)      How do you get to doctors appointments? car, drives self (P)      Are you on dialysis? Yes (P)      Dialysis Name and Scheduled days [facility] ([facility]) [facility] (P)      Do you take coumadin? No (P)      Discharge Plan A Home with family (P)      Discharge Plan B Home Health (P)      DME Needed Upon Discharge  none (P)      Discharge Plan discussed with: Patient (P)         Physical Activity    On average, how many days per week do you engage in moderate to strenuous exercise (like a brisk walk)? 0 days (P)      On average, how many minutes do you engage in exercise at this level? 0 min (P)         Financial Resource Strain    How hard is it for you to pay for the very basics like food, housing, medical care, and heating? Somewhat hard (P)         Housing Stability    In the last 12 months, was there a time when you were not able to pay the mortgage or rent on time? Yes (P)      At any time in the past 12 months, were you homeless or living in a shelter (including now)? No (P)         Transportation Needs    Has the lack of transportation kept you from medical appointments, meetings, work or from getting things needed for daily living? No (P)         Food Insecurity    Within the past 12 months, you worried that your food would run out before you got the money to buy more. Never  true (P)      Within the past 12 months, the food you bought just didn't last and you didn't have money to get more. Never true (P)         Stress    Do you feel stress - tense, restless, nervous, or anxious, or unable to sleep at night because your mind is troubled all the time - these days? Not at all (P)         Social Isolation    How often do you feel lonely or isolated from those around you?  Never (P)         Alcohol Use    Q1: How often do you have a drink containing alcohol? Never (P)      Q2: How many drinks containing alcohol do you have on a typical day when you are drinking? Patient does not drink (P)      Q3: How often do you have six or more drinks on one occasion? Never (P)         Flavours    In the past 12 months has the electric, gas, oil, or water company threatened to shut off services in your home? No (P)         Health Literacy    How often do you need to have someone help you when you read instructions, pamphlets, or other written material from your doctor or pharmacy? Rarely (P)    pt wears glasses                     1150  Patient resting quietly in the HD unit when CM rounded. No family present. Patient was admitted with hypoxic respiratory failure due to volume overload & is being followed by neph.    Patient lives with his spouse, Karmen Gross ([phone]), & 20 y.o. grandson with ADHD, has equipment to assist with ADLs, receives HD treatments at [facility] ([facility]) via [facility], & denied the need for assistance with transportation at time of discharge.     Pt stated that he has not been able to pay his rent for the past 3 months. Pt agreeable for this CM to enter a referral for out-pt case management. Referral entered.     CM updated patient's whiteboard with CM name & contact information.     1520  DC order noted. Scheduled hospfu appt with Dr Adelaide Puga on 9/16/2024 at 1030 noted. Information added to the pt's discharge paperwork.     CM informed the pt of his discharge  status. Pt verbalized understanding & agreement.     1525  Message sent to nurse Radha, charge nurse Marlena, & virtual nurse Aron informing that the pt is cleared to discharge.       Will continue to follow.

## 2024-09-11 ENCOUNTER — PATIENT OUTREACH (OUTPATIENT)
Dept: ADMINISTRATIVE | Facility: CLINIC | Age: 64
End: 2024-09-11
Payer: COMMERCIAL

## 2024-09-11 NOTE — PROGRESS NOTES
C3 nurse attempted to contact Domingo Gross  for a TCC post hospital discharge follow up call. No answer. No voicemail available. The patient has a scheduled HOSFU appointment with Adelaide Puga on 9/16/24 @ 1030.

## 2024-09-11 NOTE — PROGRESS NOTES
C3 nurse attempted to contact patient for a TCC post hospital discharge follow-up call. The patient declined call at this time by picking up and hanging up.

## 2024-09-12 ENCOUNTER — PATIENT MESSAGE (OUTPATIENT)
Dept: PRIMARY CARE CLINIC | Facility: CLINIC | Age: 64
End: 2024-09-12
Payer: COMMERCIAL

## 2024-09-13 ENCOUNTER — HOSPITAL ENCOUNTER (INPATIENT)
Facility: HOSPITAL | Age: 64
LOS: 4 days | Discharge: HOME | DRG: 325 | End: 2024-09-17
Attending: EMERGENCY MEDICINE | Admitting: HOSPITALIST
Payer: COMMERCIAL

## 2024-09-13 DIAGNOSIS — I21.4 NSTEMI (NON-ST ELEVATED MYOCARDIAL INFARCTION): ICD-10-CM

## 2024-09-13 DIAGNOSIS — J44.1 COPD EXACERBATION: ICD-10-CM

## 2024-09-13 DIAGNOSIS — I48.0 PAROXYSMAL ATRIAL FIBRILLATION: ICD-10-CM

## 2024-09-13 DIAGNOSIS — I50.9 ACUTE CONGESTIVE HEART FAILURE, UNSPECIFIED HEART FAILURE TYPE: Primary | ICD-10-CM

## 2024-09-13 DIAGNOSIS — I10 PRIMARY HYPERTENSION: ICD-10-CM

## 2024-09-13 DIAGNOSIS — E78.5 HYPERLIPIDEMIA, UNSPECIFIED HYPERLIPIDEMIA TYPE: ICD-10-CM

## 2024-09-13 DIAGNOSIS — Z95.828 S/P ARTERIOVENOUS (AV) GRAFT PLACEMENT: ICD-10-CM

## 2024-09-13 DIAGNOSIS — I25.118 CORONARY ARTERY DISEASE WITH OTHER FORM OF ANGINA PECTORIS, UNSPECIFIED VESSEL OR LESION TYPE, UNSPECIFIED WHETHER NATIVE OR TRANSPLANTED HEART: ICD-10-CM

## 2024-09-13 DIAGNOSIS — I50.9 CHF (CONGESTIVE HEART FAILURE): ICD-10-CM

## 2024-09-13 DIAGNOSIS — I25.110 ATHEROSCLEROSIS OF NATIVE CORONARY ARTERY OF NATIVE HEART WITH UNSTABLE ANGINA PECTORIS: ICD-10-CM

## 2024-09-13 DIAGNOSIS — R07.9 CHEST PAIN: ICD-10-CM

## 2024-09-13 DIAGNOSIS — I73.9 PAD (PERIPHERAL ARTERY DISEASE): ICD-10-CM

## 2024-09-13 DIAGNOSIS — Z99.2 ESRD (END STAGE RENAL DISEASE) ON DIALYSIS: ICD-10-CM

## 2024-09-13 DIAGNOSIS — Z95.5 S/P DRUG ELUTING CORONARY STENT PLACEMENT: ICD-10-CM

## 2024-09-13 DIAGNOSIS — N18.6 ESRD (END STAGE RENAL DISEASE) ON DIALYSIS: ICD-10-CM

## 2024-09-13 DIAGNOSIS — R06.02 SHORTNESS OF BREATH: ICD-10-CM

## 2024-09-13 LAB
ALBUMIN SERPL BCP-MCNC: 3.3 G/DL (ref 3.5–5.2)
ALP SERPL-CCNC: 62 U/L (ref 55–135)
ALT SERPL W/O P-5'-P-CCNC: 7 U/L (ref 10–44)
ANION GAP SERPL CALC-SCNC: 13 MMOL/L (ref 8–16)
AST SERPL-CCNC: 15 U/L (ref 10–40)
BASOPHILS # BLD AUTO: 0.01 K/UL (ref 0–0.2)
BASOPHILS NFR BLD: 0.1 % (ref 0–1.9)
BILIRUB SERPL-MCNC: 0.7 MG/DL (ref 0.1–1)
BNP SERPL-MCNC: 751 PG/ML (ref 0–99)
BUN SERPL-MCNC: 31 MG/DL (ref 8–23)
CALCIUM SERPL-MCNC: 9.2 MG/DL (ref 8.7–10.5)
CHLORIDE SERPL-SCNC: 94 MMOL/L (ref 95–110)
CO2 SERPL-SCNC: 28 MMOL/L (ref 23–29)
CREAT SERPL-MCNC: 4.7 MG/DL (ref 0.5–1.4)
DIFFERENTIAL METHOD BLD: ABNORMAL
EOSINOPHIL # BLD AUTO: 0 K/UL (ref 0–0.5)
EOSINOPHIL NFR BLD: 0.1 % (ref 0–8)
ERYTHROCYTE [DISTWIDTH] IN BLOOD BY AUTOMATED COUNT: 15.2 % (ref 11.5–14.5)
EST. GFR  (NO RACE VARIABLE): 13 ML/MIN/1.73 M^2
GLUCOSE SERPL-MCNC: 93 MG/DL (ref 70–110)
HBV SURFACE AB SER QL IA: POSITIVE
HBV SURFACE AB SERPL IA-ACNC: 41 MIU/ML
HCT VFR BLD AUTO: 29.5 % (ref 40–54)
HGB BLD-MCNC: 9.7 G/DL (ref 14–18)
IMM GRANULOCYTES # BLD AUTO: 0.03 K/UL (ref 0–0.04)
IMM GRANULOCYTES NFR BLD AUTO: 0.4 % (ref 0–0.5)
LYMPHOCYTES # BLD AUTO: 0.4 K/UL (ref 1–4.8)
LYMPHOCYTES NFR BLD: 5.3 % (ref 18–48)
MAGNESIUM SERPL-MCNC: 1.8 MG/DL (ref 1.6–2.6)
MCH RBC QN AUTO: 28.8 PG (ref 27–31)
MCHC RBC AUTO-ENTMCNC: 32.9 G/DL (ref 32–36)
MCV RBC AUTO: 88 FL (ref 82–98)
MONOCYTES # BLD AUTO: 1.1 K/UL (ref 0.3–1)
MONOCYTES NFR BLD: 13.6 % (ref 4–15)
NEUTROPHILS # BLD AUTO: 6.3 K/UL (ref 1.8–7.7)
NEUTROPHILS NFR BLD: 80.5 % (ref 38–73)
NRBC BLD-RTO: 0 /100 WBC
PHOSPHATE SERPL-MCNC: 3.8 MG/DL (ref 2.7–4.5)
PLATELET # BLD AUTO: 129 K/UL (ref 150–450)
PMV BLD AUTO: 10.7 FL (ref 9.2–12.9)
POTASSIUM SERPL-SCNC: 3.3 MMOL/L (ref 3.5–5.1)
PROT SERPL-MCNC: 7.2 G/DL (ref 6–8.4)
RBC # BLD AUTO: 3.37 M/UL (ref 4.6–6.2)
SODIUM SERPL-SCNC: 135 MMOL/L (ref 136–145)
TROPONIN I SERPL DL<=0.01 NG/ML-MCNC: 0.06 NG/ML (ref 0–0.03)
TROPONIN I SERPL DL<=0.01 NG/ML-MCNC: 0.07 NG/ML (ref 0–0.03)
WBC # BLD AUTO: 7.88 K/UL (ref 3.9–12.7)

## 2024-09-13 PROCEDURE — 96374 THER/PROPH/DIAG INJ IV PUSH: CPT

## 2024-09-13 PROCEDURE — 83735 ASSAY OF MAGNESIUM: CPT

## 2024-09-13 PROCEDURE — 80053 COMPREHEN METABOLIC PANEL: CPT

## 2024-09-13 PROCEDURE — 12000002 HC ACUTE/MED SURGE SEMI-PRIVATE ROOM

## 2024-09-13 PROCEDURE — 99900035 HC TECH TIME PER 15 MIN (STAT)

## 2024-09-13 PROCEDURE — 93010 ELECTROCARDIOGRAM REPORT: CPT | Mod: ,,, | Performed by: INTERNAL MEDICINE

## 2024-09-13 PROCEDURE — 85025 COMPLETE CBC W/AUTO DIFF WBC: CPT

## 2024-09-13 PROCEDURE — 84484 ASSAY OF TROPONIN QUANT: CPT | Mod: 91

## 2024-09-13 PROCEDURE — 63600175 PHARM REV CODE 636 W HCPCS: Performed by: EMERGENCY MEDICINE

## 2024-09-13 PROCEDURE — 93005 ELECTROCARDIOGRAM TRACING: CPT

## 2024-09-13 PROCEDURE — 83880 ASSAY OF NATRIURETIC PEPTIDE: CPT

## 2024-09-13 PROCEDURE — 84100 ASSAY OF PHOSPHORUS: CPT

## 2024-09-13 PROCEDURE — 25000242 PHARM REV CODE 250 ALT 637 W/ HCPCS: Performed by: EMERGENCY MEDICINE

## 2024-09-13 PROCEDURE — 99285 EMERGENCY DEPT VISIT HI MDM: CPT | Mod: 25

## 2024-09-13 PROCEDURE — 94640 AIRWAY INHALATION TREATMENT: CPT

## 2024-09-13 RX ORDER — IBUPROFEN 200 MG
24 TABLET ORAL
Status: DISCONTINUED | OUTPATIENT
Start: 2024-09-13 | End: 2024-09-17 | Stop reason: HOSPADM

## 2024-09-13 RX ORDER — IPRATROPIUM BROMIDE AND ALBUTEROL SULFATE 2.5; .5 MG/3ML; MG/3ML
3 SOLUTION RESPIRATORY (INHALATION)
Status: COMPLETED | OUTPATIENT
Start: 2024-09-13 | End: 2024-09-13

## 2024-09-13 RX ORDER — FUROSEMIDE 10 MG/ML
40 INJECTION INTRAMUSCULAR; INTRAVENOUS
Status: COMPLETED | OUTPATIENT
Start: 2024-09-13 | End: 2024-09-13

## 2024-09-13 RX ORDER — SODIUM CHLORIDE 0.9 % (FLUSH) 0.9 %
10 SYRINGE (ML) INJECTION EVERY 12 HOURS PRN
Status: DISCONTINUED | OUTPATIENT
Start: 2024-09-13 | End: 2024-09-17 | Stop reason: HOSPADM

## 2024-09-13 RX ORDER — GLUCAGON 1 MG
1 KIT INJECTION
Status: DISCONTINUED | OUTPATIENT
Start: 2024-09-13 | End: 2024-09-17 | Stop reason: HOSPADM

## 2024-09-13 RX ORDER — ACETAMINOPHEN 325 MG/1
650 TABLET ORAL EVERY 6 HOURS PRN
Status: DISCONTINUED | OUTPATIENT
Start: 2024-09-13 | End: 2024-09-17 | Stop reason: HOSPADM

## 2024-09-13 RX ORDER — ONDANSETRON HYDROCHLORIDE 2 MG/ML
4 INJECTION, SOLUTION INTRAVENOUS EVERY 8 HOURS PRN
Status: DISCONTINUED | OUTPATIENT
Start: 2024-09-13 | End: 2024-09-17

## 2024-09-13 RX ORDER — NALOXONE HCL 0.4 MG/ML
0.02 VIAL (ML) INJECTION
Status: DISCONTINUED | OUTPATIENT
Start: 2024-09-13 | End: 2024-09-17 | Stop reason: HOSPADM

## 2024-09-13 RX ORDER — IBUPROFEN 200 MG
16 TABLET ORAL
Status: DISCONTINUED | OUTPATIENT
Start: 2024-09-13 | End: 2024-09-17 | Stop reason: HOSPADM

## 2024-09-13 RX ORDER — FUROSEMIDE 10 MG/ML
20 INJECTION INTRAMUSCULAR; INTRAVENOUS EVERY 12 HOURS
Status: DISCONTINUED | OUTPATIENT
Start: 2024-09-14 | End: 2024-09-14

## 2024-09-13 RX ORDER — IPRATROPIUM BROMIDE AND ALBUTEROL SULFATE 2.5; .5 MG/3ML; MG/3ML
3 SOLUTION RESPIRATORY (INHALATION) EVERY 4 HOURS PRN
Status: DISCONTINUED | OUTPATIENT
Start: 2024-09-13 | End: 2024-09-14

## 2024-09-13 RX ADMIN — IPRATROPIUM BROMIDE AND ALBUTEROL SULFATE 3 ML: .5; 3 SOLUTION RESPIRATORY (INHALATION) at 07:09

## 2024-09-13 RX ADMIN — FUROSEMIDE 40 MG: 10 INJECTION, SOLUTION INTRAVENOUS at 07:09

## 2024-09-13 NOTE — Clinical Note
dry, intact, no bleeding and no hematoma. Left Fem. Sheath sutured in place and covered with Tegaderms.

## 2024-09-13 NOTE — Clinical Note
Diagnosis: Acute congestive heart failure, unspecified heart failure type [[id number]]   Future Attending Provider: SANDRA SORIANO [[id number]]   Reason for IP Medical Treatment  (Clinical interventions that can only be accomplished in the IP setting? ) :: COPD exacerbation

## 2024-09-13 NOTE — ED PROVIDER NOTES
Encounter Date: 9/13/2024       History     Chief Complaint   Patient presents with    Shortness of Breath     64 y.o. male to ED with c.o. shortness of breath x 5 days with associated mild mid sternal chest pain, patient states symptoms are worse with laying down. Patient denies fever/chills, denies cough, denies all other medical complaints at this time. VS stable.     Domingo Gross is a 64 y.o. male who  has a past medical history of Allergy, Anemia, Anticoagulant long-term use, Arthritis, CKD (chronic kidney disease) stage 4, GFR 15-29 ml/min, COPD (chronic obstructive pulmonary disease) (08/20/2021), Coronary artery disease, Heart attack (10/04/2019), Hematuria, Hemothorax, Hyperlipidemia, Hypertension, Hyperuricemia, Hypocalcemia, Hypokalemia, Hypophosphatemia, Hypothyroidism (3/2/2023), PAD (peripheral artery disease), Proteinuria, and Vitamin D deficiency.    The patient presents to the ED due shortness of breath.  Patient states he left the hospital a couple of days ago and has been progressively more short of breath.  He reports compliance with his dialysis and normally goes Tuesday Thursday Saturday.  Denies any fatigue leg swelling but does report orthopnea and dyspnea with exertion.  Occasional episodes of chest pain but none right now.  No other concerns today.    The history is provided by the patient.     Review of patient's allergies indicates:   Allergen Reactions    Ace inhibitors Rash     Past Medical History:   Diagnosis Date    Allergy     Anemia     Anticoagulant long-term use     Arthritis     CKD (chronic kidney disease) stage 4, GFR 15-29 ml/min     COPD (chronic obstructive pulmonary disease) 08/20/2021    Coronary artery disease     Heart attack 10/04/2019    Hematuria     Hemothorax     Hyperlipidemia     Hypertension     Hyperuricemia     Hypocalcemia     Hypokalemia     Hypophosphatemia     Hypothyroidism 3/2/2023    PAD (peripheral artery disease)     Proteinuria     Vitamin D  deficiency      Past Surgical History:   Procedure Laterality Date    ABDOMINAL AORTOGRAPHY N/A 5/10/2019    Procedure: AORTOGRAM-ABDOMINAL;  Surgeon: Keo Jenkins MD;  Location: [facility] CATH LAB/EP;  Service: Cardiology;  Laterality: N/A;  RSFA intervention     AORTOGRAPHY WITH SERIALOGRAPHY N/A 12/3/2021    Procedure: AORTOGRAM, WITH SERIALOGRAPHY;  Surgeon: Keo Jenkins MD;  Location: [facility] CATH LAB/EP;  Service: Cardiology;  Laterality: N/A;    AORTOGRAPHY WITH SERIALOGRAPHY N/A 4/1/2022    Procedure: AORTOGRAM, WITH SERIALOGRAPHY;  Surgeon: Keo Jenkins MD;  Location: [facility] CATH LAB/EP;  Service: Cardiology;  Laterality: N/A;    ATHERECTOMY, CORONARY N/A 4/25/2023    Procedure: Atherectomy-coronary;  Surgeon: Keo Jenkins MD;  Location: [facility] CATH LAB/EP;  Service: Cardiology;  Laterality: N/A;    BONE MARROW BIOPSY Right 10/12/2021    Procedure: BIOPSY-BONE MARROW;  Surgeon: Naseem Woods MD;  Location: [facility] OR;  Service: Oncology;  Laterality: Right;    CARDIAC CATHETERIZATION      CATARACT EXTRACTION      CATARACT EXTRACTION W/  INTRAOCULAR LENS IMPLANT Right 6/8/2020    Procedure: EXTRACTION, CATARACT, WITH IOL INSERTION;  Surgeon: Tin Light MD;  Location: [facility] OR;  Service: Ophthalmology;  Laterality: Right;    CATARACT EXTRACTION W/  INTRAOCULAR LENS IMPLANT Left 7/2/2020    Procedure: EXTRACTION, CATARACT, WITH IOL INSERTION;  Surgeon: Tin Light MD;  Location: [facility] OR;  Service: Ophthalmology;  Laterality: Left;    COLONOSCOPY N/A 1/6/2022    Procedure: COLONOSCOPY;  Surgeon: Kathe Penaloza MD;  Location: [facility] ENDO (2ND FLR);  Service: Endoscopy;  Laterality: N/A;  COVID test at [facility] on 1/3-GT  okay to hold Plavix for 5 days and aspirin per Dr. Becerra  2nd floor due toextensive cardiac history   instructions mailed and my [facility] portal -     CORONARY ANGIOGRAPHY N/A 3/29/2019    Procedure: ANGIOGRAM, CORONARY ARTERY;  Surgeon: Keo Jenkins MD;  Location: [facility] CATH LAB/EP;   Service: Cardiology;  Laterality: N/A;    CORONARY ANGIOGRAPHY Left 10/11/2019    Procedure: ANGIOGRAM, CORONARY ARTERY;  Surgeon: Keo Jenkins MD;  Location: [facility] CATH LAB/EP;  Service: Cardiology;  Laterality: Left;    CORONARY ANGIOGRAPHY N/A 4/10/2023    Procedure: ANGIOGRAM, CORONARY ARTERY;  Surgeon: Keo Jenkins MD;  Location: [facility] CATH LAB/EP;  Service: Cardiology;  Laterality: N/A;    CORONARY ANGIOGRAPHY N/A 6/26/2024    Procedure: ANGIOGRAM, CORONARY ARTERY;  Surgeon: Enoch Tirado MD;  Location: [facility] CATH LAB/EP;  Service: Cardiology;  Laterality: N/A;    CORONARY ANGIOPLASTY WITH STENT PLACEMENT  03/29/2019    mid and distal RCA    ESOPHAGOGASTRODUODENOSCOPY N/A 1/6/2022    Procedure: EGD (ESOPHAGOGASTRODUODENOSCOPY);  Surgeon: Kathe Penaloza MD;  Location: [address]);  Service: Endoscopy;  Laterality: N/A;    EYE SURGERY      INSERTION OF TUNNELED CENTRAL VENOUS HEMODIALYSIS CATHETER N/A 2/9/2024    Procedure: INSERTION, CATHETER, HEMODIALYSIS, DUAL LUMEN;  Surgeon: Wang Mendieta MD;  Location: [facility] OR;  Service: General;  Laterality: N/A;    INSTANTANEOUS WAVE-FREE RATIO (IFR) N/A 4/25/2023    Procedure: Instantaneous Wave-Free Ratio (IFR);  Surgeon: Keo Jenkins MD;  Location: [facility] CATH LAB/EP;  Service: Cardiology;  Laterality: N/A;    INTRAVASCULAR ULTRASOUND, NON-CORONARY  8/12/2022    Procedure: Intravascular Ultrasound, Non-Coronary;  Surgeon: Keo Jenkins MD;  Location: [facility] CATH LAB/EP;  Service: Cardiology;;    IVUS, CORONARY  4/25/2023    Procedure: IVUS, Coronary;  Surgeon: Keo Jenkins MD;  Location: [facility] CATH LAB/EP;  Service: Cardiology;;    IVUS, CORONARY  5/16/2023    Procedure: IVUS, Coronary;  Surgeon: Keo Jenkins MD;  Location: [facility] CATH LAB/EP;  Service: Cardiology;;  CX    LEFT HEART CATHETERIZATION N/A 3/29/2019    Procedure: Left heart cath;  Surgeon: Keo Jenkins MD;  Location: [facility] CATH LAB/EP;  Service: Cardiology;  Laterality: N/A;     LEFT HEART CATHETERIZATION Left 10/8/2019    Procedure: Left heart cath;  Surgeon: Keo Jenkins MD;  Location: [facility] CATH LAB/EP;  Service: Cardiology;  Laterality: Left;    LEFT HEART CATHETERIZATION Left 4/10/2023    Procedure: Left heart cath;  Surgeon: Keo Jenkins MD;  Location: [facility] CATH LAB/EP;  Service: Cardiology;  Laterality: Left;    LEFT HEART CATHETERIZATION Left 4/25/2023    Procedure: Left heart cath;  Surgeon: Keo Jenkins MD;  Location: [facility] CATH LAB/EP;  Service: Cardiology;  Laterality: Left;    LEFT HEART CATHETERIZATION Left 5/16/2023    Procedure: Left heart cath;  Surgeon: Keo Jenkins MD;  Location: [facility] CATH LAB/EP;  Service: Cardiology;  Laterality: Left;    LEFT HEART CATHETERIZATION Left 6/26/2024    Procedure: Left heart cath;  Surgeon: Enoch Tirado MD;  Location: [facility] CATH LAB/EP;  Service: Cardiology;  Laterality: Left;    PERCUTANEOUS CORONARY INTERVENTION, ARTERY N/A 6/26/2024    Procedure: Percutaneous coronary intervention;  Surgeon: Enoch Tirado MD;  Location: [facility] CATH LAB/EP;  Service: Cardiology;  Laterality: N/A;    PERCUTANEOUS TRANSLUMINAL ANGIOPLASTY (PTA) OF PERIPHERAL VESSEL N/A 7/12/2019    Procedure: PTA, PERIPHERAL VESSEL;  Surgeon: Keo Jenkins MD;  Location: [facility] CATH LAB/EP;  Service: Cardiology;  Laterality: N/A;    PERCUTANEOUS TRANSLUMINAL ANGIOPLASTY (PTA) OF PERIPHERAL VESSEL Left 5/20/2022    Procedure: PTA, PERIPHERAL VESSEL;  Surgeon: Keo Jenkins MD;  Location: [facility] CATH LAB/EP;  Service: Cardiology;  Laterality: Left;    PERCUTANEOUS TRANSLUMINAL ANGIOPLASTY (PTA) OF PERIPHERAL VESSEL Right 8/12/2022    Procedure: PTA, PERIPHERAL VESSEL;  Surgeon: Keo Jenkins MD;  Location: [facility] CATH LAB/EP;  Service: Cardiology;  Laterality: Right;    PERCUTANEOUS TRANSLUMINAL BALLOON ANGIOPLASTY OF CORONARY ARTERY  4/25/2023    Procedure: Angioplasty-coronary;  Surgeon: Keo Jenkins MD;  Location: [facility] CATH LAB/EP;  Service:  Cardiology;;    PLACEMENT OF ARTERIOVENOUS GRAFT Left 4/15/2024    Procedure: INSERTION, GRAFT, ARTERIOVENOUS;  Surgeon: Scott Pascual MD;  Location: [facility] OR;  Service: General;  Laterality: Left;    PTCA, SINGLE VESSEL  5/16/2023    Procedure: PTCA, Single Vessel;  Surgeon: Keo Jenkins MD;  Location: [facility] CATH LAB/EP;  Service: Cardiology;;  CX    PTCA, SINGLE VESSEL  6/26/2024    Procedure: PTCA, Single Vessel;  Surgeon: Enoch Tirado MD;  Location: [facility] CATH LAB/EP;  Service: Cardiology;;    REMOVAL OF HEMODIALYSIS CATHETER Right 2/9/2024    Procedure: REMOVAL, CATHETER, HEMODIALYSIS;  Surgeon: Wang Mendieta MD;  Location: [facility] OR;  Service: General;  Laterality: Right;    REMOVAL OF TUNNELED CENTRAL VENOUS CATHETER (CVC) Right 5/20/2024    Procedure: REMOVAL, CATHETER, CENTRAL VENOUS, TUNNELED;  Surgeon: Scott Pascual MD;  Location: [facility] OR;  Service: General;  Laterality: Right;    REPAIR, CHRONIC TOTAL OCCLUSION, CORONARY  6/26/2024    Procedure: Repair, Chronic Total Occlusion, Coronary;  Surgeon: Enoch Tirado MD;  Location: [facility] CATH LAB/EP;  Service: Cardiology;;    STENT, DRUG ELUTING, SINGLE VESSEL, CORONARY  4/25/2023    Procedure: Stent, Drug Eluting, Single Vessel, Coronary;  Surgeon: Keo Jenkins MD;  Location: [facility] CATH LAB/EP;  Service: Cardiology;;    STENT, DRUG ELUTING, SINGLE VESSEL, CORONARY  5/16/2023    Procedure: Stent, Drug Eluting, Single Vessel, Coronary;  Surgeon: Keo Jenkins MD;  Location: [facility] CATH LAB/EP;  Service: Cardiology;;  CX    TRANSESOPHAGEAL ECHOCARDIOGRAM WITH POSSIBLE CARDIOVERSION (JOSE W/ POSS CARDIOVERSION) N/A 6/19/2023    Procedure: Transesophageal echo (JOSE) intra-procedure log documentation;  Surgeon: Keo Jenkins MD;  Location: [facility] CATH LAB/EP;  Service: Cardiology;  Laterality: N/A;     Family History   Problem Relation Name Age of Onset    Aneurysm Mother      Hypertension Mother      Heart disease Mother       Cancer Father      Diabetes Sister 1     Hypertension Sister 1     No Known Problems Brother 1     No Known Problems Son 1      Social History     Tobacco Use    Smoking status: Former     Current packs/day: 0.00     Types: Cigarettes     Quit date: 1999     Years since quittin.4    Smokeless tobacco: Never   Substance Use Topics    Alcohol use: No     Alcohol/week: 0.0 standard drinks of alcohol    Drug use: No     Review of Systems   Constitutional:  Negative for fever.   HENT:  Negative for sore throat.    Respiratory:  Positive for shortness of breath.    Cardiovascular:  Negative for chest pain.   Gastrointestinal:  Negative for nausea.   Genitourinary:  Negative for dysuria.   Musculoskeletal:  Negative for back pain.   Skin:  Negative for rash.   Neurological:  Negative for weakness.   Hematological:  Does not bruise/bleed easily.       Physical Exam     Initial Vitals [24 1404]   BP Pulse Resp Temp SpO2   (!) 97/55 79 (!) 24 98.9 °F (37.2 °C) 97 %      MAP       --         Physical Exam    Constitutional: He appears well-nourished. He is not diaphoretic.  Non-toxic appearance. No distress.   HENT:   Head: Normocephalic and atraumatic.   Eyes: Conjunctivae are normal. Pupils are equal, round, and reactive to light. Right eye exhibits no nystagmus. Left eye exhibits no nystagmus.   Neck: Neck supple.   Cardiovascular:  Normal rate, regular rhythm, S1 normal and S2 normal.     Exam reveals no gallop.       No murmur heard.  Pulmonary/Chest: Breath sounds normal. He has no wheezes. He has no rales.   Abdominal: Abdomen is soft. He exhibits no distension. There is no abdominal tenderness.   Musculoskeletal:      Cervical back: Neck supple.     Neurological: He is alert and oriented to person, place, and time. He has normal strength. No sensory deficit. GCS score is 15. GCS eye subscore is 4. GCS verbal subscore is 5. GCS motor subscore is 6.   Skin: Skin is warm and dry. No rash noted.    Psychiatric: He has a normal mood and affect. His behavior is normal.         ED Course   Procedures  Labs Reviewed   CBC W/ AUTO DIFFERENTIAL - Abnormal       Result Value    WBC 7.88      RBC 3.37 (*)     Hemoglobin 9.7 (*)     Hematocrit 29.5 (*)     MCV 88      MCH 28.8      MCHC 32.9      RDW 15.2 (*)     Platelets 129 (*)     MPV 10.7      Immature Granulocytes 0.4      Gran # (ANC) 6.3      Immature Grans (Abs) 0.03      Lymph # 0.4 (*)     Mono # 1.1 (*)     Eos # 0.0      Baso # 0.01      nRBC 0      Gran % 80.5 (*)     Lymph % 5.3 (*)     Mono % 13.6      Eosinophil % 0.1      Basophil % 0.1      Differential Method Automated     COMPREHENSIVE METABOLIC PANEL - Abnormal    Sodium 135 (*)     Potassium 3.3 (*)     Chloride 94 (*)     CO2 28      Glucose 93      BUN 31 (*)     Creatinine 4.7 (*)     Calcium 9.2      Total Protein 7.2      Albumin 3.3 (*)     Total Bilirubin 0.7      Alkaline Phosphatase 62      AST 15      ALT 7 (*)     eGFR 13 (*)     Anion Gap 13     TROPONIN I - Abnormal    Troponin I 0.069 (*)    B-TYPE NATRIURETIC PEPTIDE - Abnormal     (*)    MAGNESIUM    Magnesium 1.8     PHOSPHORUS    Phosphorus 3.8     TROPONIN I          Imaging Results    None          Medications - No data to display  Medical Decision Making  Differential Diagnosis includes, but is not limited to:  PE, MI/ACS, pneumothorax, pericardial effusion/tamonade, pneumonia, lung abscess, pericarditis/myocarditis, pleural effusion, lung mass, CHF exacerbation, asthma exacerbation, COPD exacerbation, aspirated/ingested foreign body, airway obstruction, CO poisoning, anemia, metabolic derangement, allergy/atopy, influenza, viral URI, viral syndrome.      Amount and/or Complexity of Data Reviewed  External Data Reviewed: notes.     Details: Echocardiogram 6/25/24: EF 45-50%  Patient was admitted 09/09/2024 for fluid overload he received dialysis felt better and was sent home.  Labs:  Decision-making details  documented in ED Course.  Radiology:  Decision-making details documented in ED Course.    Risk  Prescription drug management.  Decision regarding hospitalization.  Risk Details: Patient signed out to overnight physician pending reassessment after IV Lasix.               ED Course as of 09/15/24 1232   Fri Sep 13, 2024   1634 EKG: Rate 76.  Normal sinus rhythm.  Left bundle-branch block.  Negative STEMI criteria by [facility]. [RN]   2009 Phosphorus [RN]   2009 Troponin I #2(!) [RN]   2009 BNP(!) [RN]   2009 Magnesium [RN]   2009 CBC auto differential(!) [RN]   2009 X-Ray Chest AP Portable [RN]   2009 Shared decision-making regarding admission for continued symptoms of orthopnea and shortness of breath.  Patient wishes to go.  He will receive IV Lasix DuoNeb and we will reassess.  On my initial reassessment patient has oxygen saturations in the 96-98 range.  He states he feels no longer short of breath.  Will continue to monitor. [RN]   6795 Patient has diuresed somewhat from Lasix administration.  Patient was ambulated through the department while on pulse ox with drop of oxygen to low 80s.  Required oxygen placement even after he returned to rest to aid with returning to normal saturations.  I again entered shared decision-making with the patient and family and given desaturation would prefer to stay for further management. [KB]      ED Course User Index  [KB] Avel Ocasio MD  [RN] Jeramy Fink Jr., MD                           Clinical Impression:  Final diagnoses:  [R07.9] Chest pain     Portions of this note were dictated using voice recognition software and may contain dictation related errors in spelling/grammar/syntax not found on text review              Jeramy Fink Jr., MD  09/15/24 8804

## 2024-09-13 NOTE — LETTER
September 17, 2024         [address] [place] [address] [place] [address]  Phone: [phone]  Fax: [phone]       Patient: Domingo Gross   YOB: 1960  Date of Visit: 09/17/2024    To Whom It May Concern:    Martha Gross  was at [facility] on 09/13/2024- 09/17/2024. The patient may return to work on 9/20/24 with restrictions: Avoid heavy lifting (more than 10 pounds) and pushing or pulling heavy objects . If you have any questions or concerns, or if I can be of further assistance, please do not hesitate to contact me.    Sincerely,    Yolanda Zambrano MD

## 2024-09-13 NOTE — Clinical Note
265 ml of contrast were injected throughout the case. 135 mL of contrast was the total wasted during the case. 400 mL was the total amount used during the case.

## 2024-09-13 NOTE — Clinical Note
All catheters were removed.  Class II - visualization of the soft palate, fauces, and uvula Class III - visualization of the soft palate and the base of the uvula

## 2024-09-13 NOTE — Clinical Note
The catheter was inserted into the ostium   left main. An angiography was performed of the right coronary arteries. Multiple views were taken. The angiography was performed via hand injection with 12 mL of contrast.

## 2024-09-13 NOTE — Clinical Note
The catheter was inserted into the ostium   left main. An angiography was performed of the left coronary arteries. Multiple views were taken. The angiography was performed via hand injection with 40 mL of contrast.   / 45 (69)

## 2024-09-14 PROBLEM — R06.02 SHORTNESS OF BREATH: Status: ACTIVE | Noted: 2024-09-14

## 2024-09-14 PROBLEM — Z95.5 S/P DRUG ELUTING CORONARY STENT PLACEMENT: Status: ACTIVE | Noted: 2024-09-14

## 2024-09-14 PROBLEM — I25.110 ATHEROSCLEROSIS OF NATIVE CORONARY ARTERY OF NATIVE HEART WITH UNSTABLE ANGINA PECTORIS: Status: ACTIVE | Noted: 2024-09-14

## 2024-09-14 LAB
ANION GAP SERPL CALC-SCNC: 14 MMOL/L (ref 8–16)
APICAL FOUR CHAMBER EJECTION FRACTION: 56 %
APICAL TWO CHAMBER EJECTION FRACTION: 54 %
APTT PPP: 44 SEC (ref 21–32)
ASCENDING AORTA: 3.36 CM
AV INDEX (PROSTH): 0.84
AV MEAN GRADIENT: 5 MMHG
AV PEAK GRADIENT: 9 MMHG
AV VALVE AREA BY VELOCITY RATIO: 3.06 CM²
AV VALVE AREA: 3.26 CM²
AV VELOCITY RATIO: 0.79
BASOPHILS # BLD AUTO: 0.02 K/UL (ref 0–0.2)
BASOPHILS NFR BLD: 0.3 % (ref 0–1.9)
BSA FOR ECHO PROCEDURE: 1.95 M2
BUN SERPL-MCNC: 36 MG/DL (ref 8–23)
CALCIUM SERPL-MCNC: 9 MG/DL (ref 8.7–10.5)
CHLORIDE SERPL-SCNC: 94 MMOL/L (ref 95–110)
CO2 SERPL-SCNC: 25 MMOL/L (ref 23–29)
CREAT SERPL-MCNC: 4.9 MG/DL (ref 0.5–1.4)
CV ECHO LV RWT: 0.41 CM
DIFFERENTIAL METHOD BLD: ABNORMAL
DOP CALC AO PEAK VEL: 1.48 M/S
DOP CALC AO VTI: 27.8 CM
DOP CALC LVOT AREA: 3.9 CM2
DOP CALC LVOT DIAMETER: 2.22 CM
DOP CALC LVOT PEAK VEL: 1.17 M/S
DOP CALC LVOT STROKE VOLUME: 90.53 CM3
DOP CALC MV VTI: 39.2 CM
DOP CALCLVOT PEAK VEL VTI: 23.4 CM
E WAVE DECELERATION TIME: 187.08 MSEC
E/A RATIO: 1.34
E/E' RATIO: 14.71 M/S
ECHO LV POSTERIOR WALL: 1.23 CM (ref 0.6–1.1)
EOSINOPHIL # BLD AUTO: 0 K/UL (ref 0–0.5)
EOSINOPHIL NFR BLD: 0.3 % (ref 0–8)
ERYTHROCYTE [DISTWIDTH] IN BLOOD BY AUTOMATED COUNT: 15.1 % (ref 11.5–14.5)
EST. GFR  (NO RACE VARIABLE): 13 ML/MIN/1.73 M^2
FRACTIONAL SHORTENING: 24 % (ref 28–44)
GLUCOSE SERPL-MCNC: 96 MG/DL (ref 70–110)
HCT VFR BLD AUTO: 27.9 % (ref 40–54)
HGB BLD-MCNC: 9.3 G/DL (ref 14–18)
IMM GRANULOCYTES # BLD AUTO: 0.02 K/UL (ref 0–0.04)
IMM GRANULOCYTES NFR BLD AUTO: 0.3 % (ref 0–0.5)
INR PPP: 1.3 (ref 0.8–1.2)
INR PPP: 1.4 (ref 0.8–1.2)
INTERVENTRICULAR SEPTUM: 1.13 CM (ref 0.6–1.1)
LA MAJOR: 6.96 CM
LA MINOR: 6.82 CM
LA WIDTH: 3.8 CM
LEFT ATRIUM AREA SYSTOLIC (APICAL 2 CHAMBER): 19.29 CM2
LEFT ATRIUM AREA SYSTOLIC (APICAL 4 CHAMBER): 22.09 CM2
LEFT ATRIUM SIZE: 3.96 CM
LEFT ATRIUM VOLUME INDEX MOD: 27.5 ML/M2
LEFT ATRIUM VOLUME INDEX: 45.2 ML/M2
LEFT ATRIUM VOLUME MOD: 53.57 CM3
LEFT ATRIUM VOLUME: 88.12 CM3
LEFT INTERNAL DIMENSION IN SYSTOLE: 4.52 CM (ref 2.1–4)
LEFT VENTRICLE DIASTOLIC VOLUME INDEX: 90.72 ML/M2
LEFT VENTRICLE DIASTOLIC VOLUME: 176.9 ML
LEFT VENTRICLE END DIASTOLIC VOLUME APICAL 2 CHAMBER: 125.83 ML
LEFT VENTRICLE END DIASTOLIC VOLUME APICAL 4 CHAMBER: 99.84 ML
LEFT VENTRICLE END SYSTOLIC VOLUME APICAL 2 CHAMBER: 47.43 ML
LEFT VENTRICLE END SYSTOLIC VOLUME APICAL 4 CHAMBER: 53.07 ML
LEFT VENTRICLE MASS INDEX: 155 G/M2
LEFT VENTRICLE SYSTOLIC VOLUME INDEX: 48 ML/M2
LEFT VENTRICLE SYSTOLIC VOLUME: 93.64 ML
LEFT VENTRICULAR INTERNAL DIMENSION IN DIASTOLE: 5.95 CM (ref 3.5–6)
LEFT VENTRICULAR MASS: 302.79 G
LV LATERAL E/E' RATIO: 12.88 M/S
LV SEPTAL E/E' RATIO: 17.17 M/S
LVED V (TEICH): 176.9 ML
LVES V (TEICH): 93.64 ML
LVOT MG: 3.04 MMHG
LVOT MV: 0.83 CM/S
LYMPHOCYTES # BLD AUTO: 0.5 K/UL (ref 1–4.8)
LYMPHOCYTES NFR BLD: 7.4 % (ref 18–48)
MCH RBC QN AUTO: 28.8 PG (ref 27–31)
MCHC RBC AUTO-ENTMCNC: 33.3 G/DL (ref 32–36)
MCV RBC AUTO: 86 FL (ref 82–98)
MONOCYTES # BLD AUTO: 0.9 K/UL (ref 0.3–1)
MONOCYTES NFR BLD: 12.5 % (ref 4–15)
MV MEAN GRADIENT: 2 MMHG
MV PEAK A VEL: 0.77 M/S
MV PEAK E VEL: 1.03 M/S
MV PEAK GRADIENT: 6 MMHG
MV VALVE AREA BY CONTINUITY EQUATION: 2.31 CM2
NEUTROPHILS # BLD AUTO: 5.6 K/UL (ref 1.8–7.7)
NEUTROPHILS NFR BLD: 79.2 % (ref 38–73)
NRBC BLD-RTO: 0 /100 WBC
OHS LV EJECTION FRACTION SIMPSONS BIPLANE MOD: 54 %
PISA MRMAX VEL: 4.96 M/S
PISA TR MAX VEL: 1.37 M/S
PLATELET # BLD AUTO: 123 K/UL (ref 150–450)
PMV BLD AUTO: 11.1 FL (ref 9.2–12.9)
POTASSIUM SERPL-SCNC: 3.1 MMOL/L (ref 3.5–5.1)
PROTHROMBIN TIME: 14.1 SEC (ref 9–12.5)
PROTHROMBIN TIME: 14.5 SEC (ref 9–12.5)
PULM VEIN S/D RATIO: 1.29
PV MV: 0.81 M/S
PV PEAK D VEL: 0.41 M/S
PV PEAK GRADIENT: 5 MMHG
PV PEAK S VEL: 0.53 M/S
PV PEAK VELOCITY: 1.14 M/S
RA MAJOR: 5.33 CM
RA PRESSURE ESTIMATED: 3 MMHG
RA WIDTH: 3.96 CM
RBC # BLD AUTO: 3.23 M/UL (ref 4.6–6.2)
RIGHT VENTRICLE DIASTOLIC BASEL DIMENSION: 3.8 CM
RV TB RVSP: 4 MMHG
RV TISSUE DOPPLER FREE WALL SYSTOLIC VELOCITY 1 (APICAL 4 CHAMBER VIEW): 13.33 CM/S
SINUS: 3.78 CM
SODIUM SERPL-SCNC: 133 MMOL/L (ref 136–145)
STJ: 2.6 CM
TDI LATERAL: 0.08 M/S
TDI SEPTAL: 0.06 M/S
TDI: 0.07 M/S
TR MAX PG: 8 MMHG
TRICUSPID ANNULAR PLANE SYSTOLIC EXCURSION: 2.09 CM
TV REST PULMONARY ARTERY PRESSURE: 11 MMHG
WBC # BLD AUTO: 7.06 K/UL (ref 3.9–12.7)
Z-SCORE OF LEFT VENTRICULAR DIMENSION IN END DIASTOLE: 0.68
Z-SCORE OF LEFT VENTRICULAR DIMENSION IN END SYSTOLE: 2.21

## 2024-09-14 PROCEDURE — 85730 THROMBOPLASTIN TIME PARTIAL: CPT | Performed by: STUDENT IN AN ORGANIZED HEALTH CARE EDUCATION/TRAINING PROGRAM

## 2024-09-14 PROCEDURE — 63600175 PHARM REV CODE 636 W HCPCS: Performed by: STUDENT IN AN ORGANIZED HEALTH CARE EDUCATION/TRAINING PROGRAM

## 2024-09-14 PROCEDURE — 25000003 PHARM REV CODE 250: Performed by: FAMILY MEDICINE

## 2024-09-14 PROCEDURE — 25000003 PHARM REV CODE 250: Performed by: STUDENT IN AN ORGANIZED HEALTH CARE EDUCATION/TRAINING PROGRAM

## 2024-09-14 PROCEDURE — 80048 BASIC METABOLIC PNL TOTAL CA: CPT | Performed by: FAMILY MEDICINE

## 2024-09-14 PROCEDURE — 99900035 HC TECH TIME PER 15 MIN (STAT)

## 2024-09-14 PROCEDURE — 5A1D70Z PERFORMANCE OF URINARY FILTRATION, INTERMITTENT, LESS THAN 6 HOURS PER DAY: ICD-10-PCS | Performed by: INTERNAL MEDICINE

## 2024-09-14 PROCEDURE — 85730 THROMBOPLASTIN TIME PARTIAL: CPT | Mod: 91 | Performed by: STUDENT IN AN ORGANIZED HEALTH CARE EDUCATION/TRAINING PROGRAM

## 2024-09-14 PROCEDURE — 94761 N-INVAS EAR/PLS OXIMETRY MLT: CPT

## 2024-09-14 PROCEDURE — 85025 COMPLETE CBC W/AUTO DIFF WBC: CPT | Performed by: FAMILY MEDICINE

## 2024-09-14 PROCEDURE — 36415 COLL VENOUS BLD VENIPUNCTURE: CPT | Performed by: STUDENT IN AN ORGANIZED HEALTH CARE EDUCATION/TRAINING PROGRAM

## 2024-09-14 PROCEDURE — 63600175 PHARM REV CODE 636 W HCPCS: Performed by: FAMILY MEDICINE

## 2024-09-14 PROCEDURE — 90935 HEMODIALYSIS ONE EVALUATION: CPT

## 2024-09-14 PROCEDURE — 11000001 HC ACUTE MED/SURG PRIVATE ROOM

## 2024-09-14 PROCEDURE — 85610 PROTHROMBIN TIME: CPT | Performed by: FAMILY MEDICINE

## 2024-09-14 PROCEDURE — 99223 1ST HOSP IP/OBS HIGH 75: CPT | Mod: 25,,, | Performed by: STUDENT IN AN ORGANIZED HEALTH CARE EDUCATION/TRAINING PROGRAM

## 2024-09-14 PROCEDURE — 85610 PROTHROMBIN TIME: CPT | Mod: 91 | Performed by: STUDENT IN AN ORGANIZED HEALTH CARE EDUCATION/TRAINING PROGRAM

## 2024-09-14 RX ORDER — ISOSORBIDE MONONITRATE 30 MG/1
30 TABLET, EXTENDED RELEASE ORAL DAILY
Status: DISCONTINUED | OUTPATIENT
Start: 2024-09-14 | End: 2024-09-17 | Stop reason: HOSPADM

## 2024-09-14 RX ORDER — SODIUM CHLORIDE 9 MG/ML
INJECTION, SOLUTION INTRAVENOUS ONCE
OUTPATIENT
Start: 2024-09-14 | End: 2024-09-14

## 2024-09-14 RX ORDER — CARVEDILOL 25 MG/1
25 TABLET ORAL 2 TIMES DAILY WITH MEALS
Status: DISCONTINUED | OUTPATIENT
Start: 2024-09-14 | End: 2024-09-17 | Stop reason: HOSPADM

## 2024-09-14 RX ORDER — LEVOTHYROXINE SODIUM 75 UG/1
75 TABLET ORAL
Status: DISCONTINUED | OUTPATIENT
Start: 2024-09-14 | End: 2024-09-17 | Stop reason: HOSPADM

## 2024-09-14 RX ORDER — MUPIROCIN 20 MG/G
OINTMENT TOPICAL 2 TIMES DAILY
OUTPATIENT
Start: 2024-09-14 | End: 2024-09-19

## 2024-09-14 RX ORDER — PREDNISONE 20 MG/1
60 TABLET ORAL DAILY
Status: DISCONTINUED | OUTPATIENT
Start: 2024-09-14 | End: 2024-09-17 | Stop reason: HOSPADM

## 2024-09-14 RX ORDER — POTASSIUM CHLORIDE 20 MEQ/1
40 TABLET, EXTENDED RELEASE ORAL ONCE
Status: COMPLETED | OUTPATIENT
Start: 2024-09-14 | End: 2024-09-14

## 2024-09-14 RX ORDER — ALBUTEROL SULFATE 90 UG/1
2 INHALANT RESPIRATORY (INHALATION) EVERY 6 HOURS PRN
Status: DISCONTINUED | OUTPATIENT
Start: 2024-09-14 | End: 2024-09-17 | Stop reason: HOSPADM

## 2024-09-14 RX ORDER — CLOPIDOGREL BISULFATE 75 MG/1
75 TABLET ORAL DAILY
Status: DISCONTINUED | OUTPATIENT
Start: 2024-09-14 | End: 2024-09-17 | Stop reason: HOSPADM

## 2024-09-14 RX ORDER — IPRATROPIUM BROMIDE AND ALBUTEROL SULFATE 2.5; .5 MG/3ML; MG/3ML
3 SOLUTION RESPIRATORY (INHALATION) EVERY 6 HOURS PRN
Status: DISCONTINUED | OUTPATIENT
Start: 2024-09-14 | End: 2024-09-17 | Stop reason: HOSPADM

## 2024-09-14 RX ORDER — HEPARIN SODIUM,PORCINE/D5W 25000/250
12 INTRAVENOUS SOLUTION INTRAVENOUS CONTINUOUS
Status: DISCONTINUED | OUTPATIENT
Start: 2024-09-14 | End: 2024-09-16

## 2024-09-14 RX ORDER — HYDRALAZINE HYDROCHLORIDE 25 MG/1
50 TABLET, FILM COATED ORAL EVERY 8 HOURS
Status: DISCONTINUED | OUTPATIENT
Start: 2024-09-14 | End: 2024-09-17 | Stop reason: HOSPADM

## 2024-09-14 RX ORDER — ATORVASTATIN CALCIUM 40 MG/1
40 TABLET, FILM COATED ORAL NIGHTLY
Status: DISCONTINUED | OUTPATIENT
Start: 2024-09-14 | End: 2024-09-17 | Stop reason: HOSPADM

## 2024-09-14 RX ORDER — HYDROXYZINE PAMOATE 25 MG/1
25 CAPSULE ORAL EVERY 6 HOURS PRN
Status: DISCONTINUED | OUTPATIENT
Start: 2024-09-14 | End: 2024-09-17 | Stop reason: HOSPADM

## 2024-09-14 RX ADMIN — LEVOTHYROXINE SODIUM 75 MCG: 25 TABLET ORAL at 09:09

## 2024-09-14 RX ADMIN — PREDNISONE 60 MG: 20 TABLET ORAL at 09:09

## 2024-09-14 RX ADMIN — ATORVASTATIN CALCIUM 40 MG: 40 TABLET, FILM COATED ORAL at 10:09

## 2024-09-14 RX ADMIN — HEPARIN SODIUM 12 UNITS/KG/HR: 10000 INJECTION, SOLUTION INTRAVENOUS at 05:09

## 2024-09-14 RX ADMIN — HYDRALAZINE HYDROCHLORIDE 50 MG: 25 TABLET ORAL at 10:09

## 2024-09-14 RX ADMIN — METHYLPREDNISOLONE SODIUM SUCCINATE 60 MG: 40 INJECTION, POWDER, FOR SOLUTION INTRAMUSCULAR; INTRAVENOUS at 05:09

## 2024-09-14 RX ADMIN — CLOPIDOGREL 75 MG: 75 TABLET, FILM COATED ORAL at 09:09

## 2024-09-14 RX ADMIN — ISOSORBIDE MONONITRATE 30 MG: 30 TABLET, EXTENDED RELEASE ORAL at 09:09

## 2024-09-14 RX ADMIN — POTASSIUM CHLORIDE 40 MEQ: 1500 TABLET, EXTENDED RELEASE ORAL at 09:09

## 2024-09-14 RX ADMIN — CARVEDILOL 25 MG: 25 TABLET, FILM COATED ORAL at 09:09

## 2024-09-14 RX ADMIN — CARVEDILOL 25 MG: 25 TABLET, FILM COATED ORAL at 05:09

## 2024-09-14 RX ADMIN — CEFTRIAXONE SODIUM 1 G: 1 INJECTION, POWDER, FOR SOLUTION INTRAMUSCULAR; INTRAVENOUS at 12:09

## 2024-09-14 RX ADMIN — APIXABAN 5 MG: 5 TABLET, FILM COATED ORAL at 09:09

## 2024-09-14 NOTE — ASSESSMENT & PLAN NOTE
Creatine stable for now. BMP reviewed- noted Estimated Creatinine Clearance: 16.6 mL/min (A) (based on SCr of 4.7 mg/dL (H)). according to latest data. Based on current GFR, CKD stage is end stage.  Monitor UOP and serial BMP and adjust therapy as needed. Renally dose meds. Avoid nephrotoxic medications and procedures.

## 2024-09-14 NOTE — PROGRESS NOTES
[facility] [place] Emergency Dept  [facility] Medicine  Progress Note    Patient Name: Domingo Gross  MRN: [id number]  Patient Class: IP- Inpatient   Admission Date: 9/13/2024  Length of Stay: 1 days  Attending Physician: Nima Ervin MD  Primary Care Provider: Perez Bell DO        Subjective:     Principal Problem:COPD exacerbation        HPI:  This is a 64-year-old male with a history of end-stage renal disease on renal dialysis, COPD, hypertension, diabetes mellitus type, and CHF who presents to emergency department with 5 days' history of having worsening shortness of breath and chest discomfort.  Patient was seen in the ED and he was given DuoNebs and Lasix and he had improvement.  However he still was desatting to about 84% and complaining of chest discomfort.  Patient was put on supplemental oxygen and due to patient's presenting symptoms he will require admission for further management.    At time of my examination patient denied any headache, fever, chills, hemoptysis, a complaint of shortness of breath and chest pain.    Overview/Hospital Course:  No notes on file    No new subjective & objective note has been filed under this hospital service since the last note was generated.      Assessment/Plan:      * COPD exacerbation  Patient's COPD is with exacerbation noted by continued dyspnea currently.  Patient is currently on COPD Pathway. Continue scheduled inhalers Steroids and Supplemental oxygen and monitor respiratory status closely.     Prednisone 60 mg po for 5 days total  Patient started feeling better , no wheezing on exam        Chest pain  Trend troponins.  Aspirin,sublingual nitroglycerin p.r.n. chest pain.  Inpatient Cardiology consultation, plan for cardiac cath on Monday      ESRD (end stage renal disease) on dialysis  Creatine stable for now. BMP reviewed- noted Estimated Creatinine Clearance: 15.9 mL/min (A) (based on SCr of 4.9 mg/dL (H)). according to latest data. Based on current GFR,  CKD stage is end stage.  Monitor UOP and serial BMP and adjust therapy as needed. Renally dose meds. Avoid nephrotoxic medications and procedures.    Nephro consult for HD inpatient    Paroxysmal atrial fibrillation  Patient with Long standing persistent (>12 months) atrial fibrillation which is controlled currently with Beta Blocker. Patient is currently in sinus rhythm.[id number] Score: 1. Anticoagulation indicated. Anticoagulation done with heparin drip , Eliquis is on hold because patient is planned for cardiac catheterization on Monday .    Coronary artery disease  Patient with known CAD s/p stent placement and CABG, which is controlled Will continue Plavix and Statin and monitor for S/Sx of angina/ACS. Continue to monitor on telemetry.   Patient is complaining of chest pain on admission  Seen for cardiology: plan for cardiac cath on Monday  Patient is aware of the plan and agree    HTN (hypertension)  Chronic, controlled. Latest blood pressure and vitals reviewed-     Temp:  [97.9 °F (36.6 °C)-98.9 °F (37.2 °C)]   Pulse:  [67-79]   Resp:  [16-24]   BP: ()/(54-63)   SpO2:  [84 %-98 %] .   Home meds for hypertension were reviewed and noted below.   Hypertension Medications               amLODIPine (NORVASC) 5 MG tablet Take 1 tablet (5 mg total) by mouth once daily.    carvediloL (COREG) 25 MG tablet Take 1 tablet (25 mg total) by mouth 2 (two) times daily with meals.    eplerenone (INSPRA) 50 MG Tab Take 1 tablet (50 mg total) by mouth once daily.    hydrALAZINE (APRESOLINE) 50 MG tablet Take 1 tablet (50 mg total) by mouth every 8 (eight) hours.    isosorbide mononitrate (IMDUR) 30 MG 24 hr tablet Take 1 tablet (30 mg total) by mouth once daily.    nitroGLYCERIN (NITROSTAT) 0.4 MG SL tablet Place 1 tablet (0.4 mg total) under the tongue every 5 (five) minutes as needed for Chest pain (for a max of 3 tabs in 15 minutes).            While in the hospital, will manage blood pressure as follows; Continue  home antihypertensive regimen    Will utilize p.r.n. blood pressure medication only if patient's blood pressure greater than 180/110 and he develops symptoms such as worsening chest pain or shortness of breath.      VTE Risk Mitigation (From admission, onward)           Ordered     heparin 25,000 units in dextrose 5% (100 units/ml) IV bolus from bag LOW INTENSITY nomogram - [facility]  Once        Question:  Heparin Infusion Adjustment (DO NOT MODIFY ANSWER)  Answer:  [email or web address]\epic\Images\Pharmacy\HeparinInfusions\heparin LOW INTENSITY nomogram for [facility] [id number]    09/14/24 1115     heparin 25,000 units in dextrose 5% 250 mL (100 units/mL) infusion LOW INTENSITY nomogram - [facility]  Continuous        Question:  Begin at (units/kg/hr)  Answer:  12    09/14/24 1115     heparin 25,000 units in dextrose 5% (100 units/ml) IV bolus from bag LOW INTENSITY nomogram - [facility]  As needed (PRN)        Question:  Heparin Infusion Adjustment (DO NOT MODIFY ANSWER)  Answer:  [email or web address]\epic\Images\Pharmacy\HeparinInfusions\heparin LOW INTENSITY nomogram for [facility] [id number]    09/14/24 1115     heparin 25,000 units in dextrose 5% (100 units/ml) IV bolus from bag LOW INTENSITY nomogram - [facility]  As needed (PRN)        Question:  Heparin Infusion Adjustment (DO NOT MODIFY ANSWER)  Answer:  [email or web address]\epic\Images\Pharmacy\HeparinInfusions\heparin LOW INTENSITY nomogram for [facility] [id number]    09/14/24 1115     IP VTE HIGH RISK PATIENT  Once         09/13/24 2248     Place sequential compression device  Until discontinued         09/13/24 2248                    Discharge Planning   FLACO:      Code Status: Full Code   Is the patient medically ready for discharge?:     Reason for patient still in hospital (select all that apply): Treatment and Consult recommendations                     Nima Ervin MD  Department of Hospital Medicine   [facility] - Emergency Dept

## 2024-09-14 NOTE — H&P (VIEW-ONLY)
[facility] Consult Note     Attending Physician: Nima Ervin MD  Cardiology Attending Physician: Enoch Salazar MD  Date of Admit: 9/13/2024  Reason for Consult:     History of Present Illness:       Mr. Gross is a pleasant 63 yo gentleman with hx of HTN, ESRD on HD, PAD with multiple revascularization, complex CAD (mid LAD/prox RCA PCI 3/2019 and prox LCA in 10/2019, bifurcation Lcx-OM PCI with ISR/ s/p PTCA) presents with SOB for 5 days which is similar to his previous presentation required interventions. Per patient he did not missed any of his HD 3 hrs sessions. Denies cp, palpitations, presyncope/dizziness, syncope, orthopnea, PND, bendopnea, decrease ET, NVDC, fever, chills.      History obtained by patient interview and supplemented by nursing documentation and chart review.     PMHx:  has a past medical history of Acute congestive heart failure (9/13/2024), Allergy, Anemia, Anticoagulant long-term use, Arthritis, CKD (chronic kidney disease) stage 4, GFR 15-29 ml/min, COPD (chronic obstructive pulmonary disease) (08/20/2021), Coronary artery disease, Heart attack (10/04/2019), Hematuria, Hemothorax, Hyperlipidemia, Hypertension, Hyperuricemia, Hypocalcemia, Hypokalemia, Hypophosphatemia, Hypothyroidism (3/2/2023), PAD (peripheral artery disease), Proteinuria, and Vitamin D deficiency.   SurgHx:  has a past surgical history that includes Eye surgery; Cardiac catheterization; Left heart catheterization (N/A, 3/29/2019); Coronary angiography (N/A, 3/29/2019); Abdominal aortography (N/A, 5/10/2019); Percutaneous transluminal angioplasty (PTA) of peripheral vessel (N/A, 7/12/2019); Coronary angioplasty with stent (03/29/2019); Left heart catheterization (Left, 10/8/2019); Coronary angiography (Left, 10/11/2019); Cataract extraction; Cataract extraction w/  intraocular lens implant (Right, 6/8/2020); Cataract extraction w/  intraocular lens implant (Left, 7/2/2020); Bone marrow biopsy  (Right, 10/12/2021); Aortography with serialography (N/A, 12/3/2021); Esophagogastroduodenoscopy (N/A, 1/6/2022); Colonoscopy (N/A, 1/6/2022); Aortography with serialography (N/A, 4/1/2022); Percutaneous transluminal angioplasty (PTA) of peripheral vessel (Left, 5/20/2022); Percutaneous transluminal angioplasty (PTA) of peripheral vessel (Right, 8/12/2022); intravascular ultrasound, non-coronary (8/12/2022); Left heart catheterization (Left, 4/10/2023); Coronary angiography (N/A, 4/10/2023); Left heart catheterization (Left, 4/25/2023); instantaneous wave-free ratio (ifr) (N/A, 4/25/2023); atherectomy, coronary (N/A, 4/25/2023); Percutaneous transluminal balloon angioplasty of coronary artery (4/25/2023); stent, drug eluting, single vessel, coronary (4/25/2023); ivus, coronary (4/25/2023); Left heart catheterization (Left, 5/16/2023); ivus, coronary (5/16/2023); ptca, single vessel (5/16/2023); stent, drug eluting, single vessel, coronary (5/16/2023); transesophageal echocardiogram with possible cardioversion (julian w/ poss cardioversion) (N/A, 6/19/2023); Insertion of tunneled central venous hemodialysis catheter (N/A, 2/9/2024); Removal of hemodialysis catheter (Right, 2/9/2024); Placement of arteriovenous graft (Left, 4/15/2024); Removal of tunneled central venous catheter (CVC) (Right, 5/20/2024); Left heart catheterization (Left, 6/26/2024); percutaneous coronary intervention, artery (N/A, 6/26/2024); ptca, single vessel (6/26/2024); repair, chronic total occlusion, coronary (6/26/2024); and Coronary angiography (N/A, 6/26/2024).   FamHx: family history includes Aneurysm in his mother; Cancer in his father; Diabetes in his sister; Heart disease in his mother; Hypertension in his mother and sister; No Known Problems in his brother and son.   SocialHx:  reports that he quit smoking about 25 years ago. His smoking use included cigarettes. He has never used smokeless tobacco. He reports that he does not drink alcohol  "and does not use drugs.  Home Meds:  Current Outpatient Medications   Medication Instructions    albuterol (PROVENTIL/VENTOLIN HFA) 90 mcg/actuation inhaler 2 puffs, Inhalation, Every 6 hours PRN    albuterol-ipratropium (DUO-NEB) 2.5 mg-0.5 mg/3 mL nebulizer solution 3 mLs, Nebulization, Every 6 hours PRN, Rescue    amLODIPine (NORVASC) 5 mg, Oral, Daily    apixaban (ELIQUIS) 5 mg, Oral, 2 times daily    carvediloL (COREG) 25 mg, Oral, 2 times daily with meals    clopidogreL (PLAVIX) 75 mg, Oral, Daily    coenzyme Q10 100 mg, Oral, Daily    eplerenone (INSPRA) 50 mg, Oral, Daily    hydrALAZINE (APRESOLINE) 50 mg, Oral, Every 8 hours    hydrOXYzine pamoate (VISTARIL) 25 MG Cap TAKE 1 CAPSULE(25 MG) BY MOUTH EVERY NIGHT AS NEEDED FOR INSOMNIA OR ANXIETY    isosorbide mononitrate (IMDUR) 30 mg, Oral, Daily    levothyroxine (SYNTHROID) 75 mcg, Oral, Before breakfast    nitroGLYCERIN (NITROSTAT) 0.4 mg, Sublingual, Every 5 min PRN    ranolazine (RANEXA) 1,000 mg, Oral, 2 times daily    rosuvastatin (CRESTOR) 40 mg, Oral, Nightly       Review of Systems: Comprehensive ROS was performed and is negative unless otherwise noted in HPI.     Objective:   Last 24 Hour Vital Signs:  BP  Min: 97/55  Max: 134/61  Temp  Av.4 °F (36.9 °C)  Min: 97.9 °F (36.6 °C)  Max: 98.9 °F (37.2 °C)  Pulse  Av.1  Min: 67  Max: 79  Resp  Av.5  Min: 16  Max: 24  SpO2  Av.8 %  Min: 84 %  Max: 98 %  Height  Av' 10" (177.8 cm)  Min: 5' 10" (177.8 cm)  Max: 5' 10" (177.8 cm)  Weight  Av.1 kg (169 lb 15.8 oz)  Min: 77.1 kg (169 lb 15.6 oz)  Max: 77.1 kg (170 lb)  I/O last 3 completed shifts:  In: 100 [IV Piggyback:100]  Out: -   Physical Examination:  Constitutional: NAD, Atraumatic   HEENT: MMM, no JVD  Cardiovascular: RRR, no murmurs noted, no chest tenderness to palpation, 2+ radial pulses b/l  Pulmonary: normal respiratory effort, CTAB, no crackles, wheezes  Abdominal: S/NT/ND  Musculoskeletal: No lower extremity edema " noted  Neurological: Alert & oriented to self, location, time and situation. No gross neurological deficits  Skin: W/D/I  Psych: Appropriate affect, normal mood    Laboratory:  Personally reviewed    Other Results:  TTE:  Results for orders placed during the hospital encounter of 06/25/24    Echo    Interpretation Summary    Left Ventricle: The left ventricle is mildly dilated. There is eccentric hypertrophy. Regional wall motion abnormalities present. See diagram for wall motion findings. There is mildly reduced systolic function with a visually estimated ejection fraction of 45 - 50%. There is indeterminate diastolic function.Elevated left ventricular filling pressure.    Right Ventricle: Normal right ventricular cavity size. Wall thickness is normal. Systolic function is normal.    Left Atrium: Left atrium is mildly dilated.    Right Atrium: Right atrium is mildly dilated.    Mitral Valve: There is mild to moderate regurgitation.    Pulmonic Valve: There is moderate regurgitation.    Pulmonary Artery: There is pulmonary hypertension. The estimated pulmonary artery systolic pressure is 47 mmHg.    IVC/SVC: Intermediate venous pressure at 8 mmHg.    Stress Testing:   Results for orders placed during the hospital encounter of 03/18/24    Treadmill Stress Test    Interpretation Summary    The ECG portion of the study is negative for ischemia.    The patient reported no chest pain during the stress test.    There were no arrhythmias during stress.    The nuclear portion of this study will be reported separately.     Coronary Angiogram:  Results for orders placed during the hospital encounter of 06/25/24    Cardiac catheterization    Conclusion    The estimated blood loss was none.      Assisting Surgeon: None    Pre-Operative Diagnosis: NSTEMI (non-ST elevation myocardial infarction) [I21.4]    Post-Operative Diagnosis: Post-Op Diagnosis Codes:  * NSTEMI (non-ST elevation myocardial infarction) [I21.4]    s/p  "catheterization secondary to: NSTEMI    Cath Results:  Access: Us guided RRA and RCFA  LM: medium caliber, YADIRA III flow  LAD: Medium caliber, stent patents, YADIRA III flow  LCx: ostium to prox stent is completely occluded, OM stent is completely occluded with microchannels. Right to left collaterals appreciated.   YADIRA III flow  RCA: medium caliber, ,stents patents with mild ISR, distal RCA and PDA with 70-80% stenosis but are small vessels <2.0mm. YADIRA III flow  LVgram: LVEDP 25    Intervention:    PCI procedure:  Heparin administered. ACT was closely monitored.  Using a EBU 3.5 guider, this was used to cannulate the left system. Multiple wires used throughout the case. Advanced juno blue to OM,  50 to Cx, runthrough to LAD. First we balloon the ost-prox cx with 2.0 mm, 2.5 mm, 2.5 mm angiosculpt, 3.0 mm NC. After multiple wires, I was able to advanced  50 to Cx and predilated in the same fashion with the same balloons. After, with two new 2.5 mm NC balloon with did KBI. With acceptable results. After the balloon was removed.  The wire was left in place.  Repeat angiography showed no signs of dissection.  Subsequently the wire was pulled back.  Final angiography showed YADIRA-3 flow.  No signs of dissection, perforation, or thrombus.    Closure device: vasc band and manual pressure  Patient tolerated procedure well, no complications    Post Cath Exam:  /61 (BP Location: Left arm, Patient Position: Lying)   Pulse 65   Temp 98.8 °F (37.1 °C) (Oral)   Resp 14   Ht 5' 10" (1.778 m)   Wt 83.7 kg (184 lb 8.4 oz)   SpO2 (!) 91%   BMI 26.48 kg/m²    Anesthesia: RN IV Sedation      Estimated Blood Loss (EBL): * No values recorded between 6/26/2024 11:24 AM and 6/26/2024  2:38 PM *    Specimens:  Specimen (24h ago, onward)    Assessment:  1. Successful angioplasty of LCX/OM with acceptable results - regaining YADIRA III flow  2. Patent stents in LAD and RCA      Plan:  1. DAPT  2. GDMT and intense statin " therapy +/- repatha  3. PET stress test as an outpatient, if ISR again will need brachytherapy  4. F/u cardiology        The procedure log was documented by Documenter: Mark Anthony Suarez RN and verified by Enoch Bray MD.    Date: 6/26/2024  Time: 6:20 PM          Assessment/Plan:     Active Hospital Problems    Diagnosis    *COPD exacerbation    Acute congestive heart failure    ESRD (end stage renal disease) on dialysis    Paroxysmal atrial fibrillation    Chest pain    HTN (hypertension)       Domingo Gross is a 64 y.o. male with a PMHx of PAD with multiple interventions, complex CAD with multiple PCIs presents with SOB (his anginal equivalent)    CAD s/p PCIs--> Continue BB, imdur, hydralazine, DAPT, statin, heparin gtt for pAFIB -discontinued eliquis (last 09/14 at 10:40 am) planning for coronary angiogram+/-PCI Monday- groin access. NPO Monday MN. Telemetry  PAD on DAPT, eliquis. Stable.   HTN- continue home medications  ESRD- nephology [facility] consulted  HLD continue statin        Enoch Bray MD  Interventional Cardiology  [facility] [place]    48 minutes were spent on this visit including time personally:  -Reviewing Medical records & lab results  -Independently reviewing and interpreting EKGs, echocardiograms, catherizations   -Obtaining a history, performing a relevant exam, counseling/educating the patient/family  -Documenting clinical information in the EMR including ordering of tests  -Care coordination and communicating with other health care providers

## 2024-09-14 NOTE — ASSESSMENT & PLAN NOTE
Patient with known CAD s/p stent placement and CABG, which is controlled Will continue Plavix and Statin and monitor for S/Sx of angina/ACS. Continue to monitor on telemetry.   Patient is complaining of chest pain on admission  Seen for cardiology: plan for cardiac cath on Monday  Patient is aware of the plan and agree

## 2024-09-14 NOTE — ASSESSMENT & PLAN NOTE
Patient's COPD is with exacerbation noted by continued dyspnea currently.  Patient is currently on COPD Pathway. Continue scheduled inhalers Steroids and Supplemental oxygen and monitor respiratory status closely.     Patient started feeling better , no wheezing on exam

## 2024-09-14 NOTE — H&P
[facility] Emergency [facility] Medicine  History & Physical    Patient Name: Domingo Gross  MRN: [id number]  Patient Class: IP- Inpatient  Admission Date: 9/13/2024  Attending Physician: Pawan Garcia,*   Primary Care Provider: Perez Bell DO         Patient information was obtained from patient and ER records.     Subjective:     Principal Problem:COPD exacerbation    Chief Complaint:   Chief Complaint   Patient presents with    Shortness of Breath     64 y.o. male to ED with c.o. shortness of breath x 5 days with associated mild mid sternal chest pain, patient states symptoms are worse with laying down.         HPI: This is a 64-year-old male with a history of end-stage renal disease on renal dialysis, COPD, hypertension, diabetes mellitus type, and CHF who presents to emergency department with 5 days' history of having worsening shortness of breath and chest discomfort.  Patient was seen in the ED and he was given DuoNebs and Lasix and he had improvement.  However he still was desatting to about 84% and complaining of chest discomfort.  Patient was put on supplemental oxygen and due to patient's presenting symptoms he will require admission for further management.    At time of my examination patient denied any headache, fever, chills, hemoptysis, a complaint of shortness of breath and chest pain.    Past Medical History:   Diagnosis Date    Acute congestive heart failure 9/13/2024    Allergy     Anemia     Anticoagulant long-term use     Arthritis     CKD (chronic kidney disease) stage 4, GFR 15-29 ml/min     COPD (chronic obstructive pulmonary disease) 08/20/2021    Coronary artery disease     Heart attack 10/04/2019    Hematuria     Hemothorax     Hyperlipidemia     Hypertension     Hyperuricemia     Hypocalcemia     Hypokalemia     Hypophosphatemia     Hypothyroidism 3/2/2023    PAD (peripheral artery disease)     Proteinuria     Vitamin D deficiency        Past Surgical History:    Procedure Laterality Date    ABDOMINAL AORTOGRAPHY N/A 5/10/2019    Procedure: AORTOGRAM-ABDOMINAL;  Surgeon: Keo Jenkins MD;  Location: [facility] CATH LAB/EP;  Service: Cardiology;  Laterality: N/A;  RSFA intervention     AORTOGRAPHY WITH SERIALOGRAPHY N/A 12/3/2021    Procedure: AORTOGRAM, WITH SERIALOGRAPHY;  Surgeon: Keo Jenkins MD;  Location: [facility] CATH LAB/EP;  Service: Cardiology;  Laterality: N/A;    AORTOGRAPHY WITH SERIALOGRAPHY N/A 4/1/2022    Procedure: AORTOGRAM, WITH SERIALOGRAPHY;  Surgeon: Keo Jenkins MD;  Location: [facility] CATH LAB/EP;  Service: Cardiology;  Laterality: N/A;    ATHERECTOMY, CORONARY N/A 4/25/2023    Procedure: Atherectomy-coronary;  Surgeon: Keo Jenkins MD;  Location: [facility] CATH LAB/EP;  Service: Cardiology;  Laterality: N/A;    BONE MARROW BIOPSY Right 10/12/2021    Procedure: BIOPSY-BONE MARROW;  Surgeon: Naseem Woods MD;  Location: [facility] OR;  Service: Oncology;  Laterality: Right;    CARDIAC CATHETERIZATION      CATARACT EXTRACTION      CATARACT EXTRACTION W/  INTRAOCULAR LENS IMPLANT Right 6/8/2020    Procedure: EXTRACTION, CATARACT, WITH IOL INSERTION;  Surgeon: Tin Light MD;  Location: [facility] OR;  Service: Ophthalmology;  Laterality: Right;    CATARACT EXTRACTION W/  INTRAOCULAR LENS IMPLANT Left 7/2/2020    Procedure: EXTRACTION, CATARACT, WITH IOL INSERTION;  Surgeon: Tin Light MD;  Location: [facility] OR;  Service: Ophthalmology;  Laterality: Left;    COLONOSCOPY N/A 1/6/2022    Procedure: COLONOSCOPY;  Surgeon: Kathe Penaloza MD;  Location: [facility] ENDO (2ND FLR);  Service: Endoscopy;  Laterality: N/A;  COVID test at [facility] on 1/3-GT  okay to hold Plavix for 5 days and aspirin per Dr. Becerra  2nd floor due toextensive cardiac history   [facility]     CORONARY ANGIOGRAPHY N/A 3/29/2019    Procedure: ANGIOGRAM, CORONARY ARTERY;  Surgeon: Keo Jenkins MD;  Location: [facility] CATH LAB/EP;  Service: Cardiology;  Laterality: N/A;     CORONARY ANGIOGRAPHY Left 10/11/2019    Procedure: ANGIOGRAM, CORONARY ARTERY;  Surgeon: Keo Jenkins MD;  Location: [facility] CATH LAB/EP;  Service: Cardiology;  Laterality: Left;    CORONARY ANGIOGRAPHY N/A 4/10/2023    Procedure: ANGIOGRAM, CORONARY ARTERY;  Surgeon: Keo Jenkins MD;  Location: [facility] CATH LAB/EP;  Service: Cardiology;  Laterality: N/A;    CORONARY ANGIOGRAPHY N/A 6/26/2024    Procedure: ANGIOGRAM, CORONARY ARTERY;  Surgeon: Enoch Tirado MD;  Location: [facility] CATH LAB/EP;  Service: Cardiology;  Laterality: N/A;    CORONARY ANGIOPLASTY WITH STENT PLACEMENT  03/29/2019    mid and distal RCA    ESOPHAGOGASTRODUODENOSCOPY N/A 1/6/2022    Procedure: EGD (ESOPHAGOGASTRODUODENOSCOPY);  Surgeon: Kathe Penaloza MD;  Location: [address]);  Service: Endoscopy;  Laterality: N/A;    EYE SURGERY      INSERTION OF TUNNELED CENTRAL VENOUS HEMODIALYSIS CATHETER N/A 2/9/2024    Procedure: INSERTION, CATHETER, HEMODIALYSIS, DUAL LUMEN;  Surgeon: Wang Mendieta MD;  Location: [facility] OR;  Service: General;  Laterality: N/A;    INSTANTANEOUS WAVE-FREE RATIO (IFR) N/A 4/25/2023    Procedure: Instantaneous Wave-Free Ratio (IFR);  Surgeon: Keo Jenkins MD;  Location: [facility] CATH LAB/EP;  Service: Cardiology;  Laterality: N/A;    INTRAVASCULAR ULTRASOUND, NON-CORONARY  8/12/2022    Procedure: Intravascular Ultrasound, Non-Coronary;  Surgeon: Keo Jenkins MD;  Location: [facility] CATH LAB/EP;  Service: Cardiology;;    IVUS, CORONARY  4/25/2023    Procedure: IVUS, Coronary;  Surgeon: Keo Jenkins MD;  Location: [facility] CATH LAB/EP;  Service: Cardiology;;    IVUS, CORONARY  5/16/2023    Procedure: IVUS, Coronary;  Surgeon: Keo Jenkins MD;  Location: [facility] CATH LAB/EP;  Service: Cardiology;;  CX    LEFT HEART CATHETERIZATION N/A 3/29/2019    Procedure: Left heart cath;  Surgeon: Keo Jenkins MD;  Location: [facility] CATH LAB/EP;  Service: Cardiology;  Laterality: N/A;    LEFT HEART CATHETERIZATION Left  10/8/2019    Procedure: Left heart cath;  Surgeon: Keo Jenkins MD;  Location: [facility] CATH LAB/EP;  Service: Cardiology;  Laterality: Left;    LEFT HEART CATHETERIZATION Left 4/10/2023    Procedure: Left heart cath;  Surgeon: Keo Jenkins MD;  Location: [facility] CATH LAB/EP;  Service: Cardiology;  Laterality: Left;    LEFT HEART CATHETERIZATION Left 4/25/2023    Procedure: Left heart cath;  Surgeon: Keo Jenkins MD;  Location: [facility] CATH LAB/EP;  Service: Cardiology;  Laterality: Left;    LEFT HEART CATHETERIZATION Left 5/16/2023    Procedure: Left heart cath;  Surgeon: Keo Jenkins MD;  Location: [facility] CATH LAB/EP;  Service: Cardiology;  Laterality: Left;    LEFT HEART CATHETERIZATION Left 6/26/2024    Procedure: Left heart cath;  Surgeon: Enoch Tirado MD;  Location: [facility] CATH LAB/EP;  Service: Cardiology;  Laterality: Left;    PERCUTANEOUS CORONARY INTERVENTION, ARTERY N/A 6/26/2024    Procedure: Percutaneous coronary intervention;  Surgeon: Enoch Tirado MD;  Location: [facility] CATH LAB/EP;  Service: Cardiology;  Laterality: N/A;    PERCUTANEOUS TRANSLUMINAL ANGIOPLASTY (PTA) OF PERIPHERAL VESSEL N/A 7/12/2019    Procedure: PTA, PERIPHERAL VESSEL;  Surgeon: Keo Jenkins MD;  Location: [facility] CATH LAB/EP;  Service: Cardiology;  Laterality: N/A;    PERCUTANEOUS TRANSLUMINAL ANGIOPLASTY (PTA) OF PERIPHERAL VESSEL Left 5/20/2022    Procedure: PTA, PERIPHERAL VESSEL;  Surgeon: Keo Jenkins MD;  Location: [facility] CATH LAB/EP;  Service: Cardiology;  Laterality: Left;    PERCUTANEOUS TRANSLUMINAL ANGIOPLASTY (PTA) OF PERIPHERAL VESSEL Right 8/12/2022    Procedure: PTA, PERIPHERAL VESSEL;  Surgeon: Keo Jenkins MD;  Location: [facility] CATH LAB/EP;  Service: Cardiology;  Laterality: Right;    PERCUTANEOUS TRANSLUMINAL BALLOON ANGIOPLASTY OF CORONARY ARTERY  4/25/2023    Procedure: Angioplasty-coronary;  Surgeon: Keo Jenkins MD;  Location: [facility] CATH LAB/EP;  Service: Cardiology;;    PLACEMENT OF  ARTERIOVENOUS GRAFT Left 4/15/2024    Procedure: INSERTION, GRAFT, ARTERIOVENOUS;  Surgeon: Scott Pascual MD;  Location: [facility] OR;  Service: General;  Laterality: Left;    PTCA, SINGLE VESSEL  5/16/2023    Procedure: PTCA, Single Vessel;  Surgeon: Keo Jenkins MD;  Location: [facility] CATH LAB/EP;  Service: Cardiology;;  CX    PTCA, SINGLE VESSEL  6/26/2024    Procedure: PTCA, Single Vessel;  Surgeon: Enoch Tirado MD;  Location: [facility] CATH LAB/EP;  Service: Cardiology;;    REMOVAL OF HEMODIALYSIS CATHETER Right 2/9/2024    Procedure: REMOVAL, CATHETER, HEMODIALYSIS;  Surgeon: Wang Mendieta MD;  Location: [facility] OR;  Service: General;  Laterality: Right;    REMOVAL OF TUNNELED CENTRAL VENOUS CATHETER (CVC) Right 5/20/2024    Procedure: REMOVAL, CATHETER, CENTRAL VENOUS, TUNNELED;  Surgeon: Scott Pascual MD;  Location: [facility] OR;  Service: General;  Laterality: Right;    REPAIR, CHRONIC TOTAL OCCLUSION, CORONARY  6/26/2024    Procedure: Repair, Chronic Total Occlusion, Coronary;  Surgeon: Enoch Tirado MD;  Location: [facility] CATH LAB/EP;  Service: Cardiology;;    STENT, DRUG ELUTING, SINGLE VESSEL, CORONARY  4/25/2023    Procedure: Stent, Drug Eluting, Single Vessel, Coronary;  Surgeon: Keo Jenkins MD;  Location: [facility] CATH LAB/EP;  Service: Cardiology;;    STENT, DRUG ELUTING, SINGLE VESSEL, CORONARY  5/16/2023    Procedure: Stent, Drug Eluting, Single Vessel, Coronary;  Surgeon: Keo Jenkins MD;  Location: [facility] CATH LAB/EP;  Service: Cardiology;;  CX    TRANSESOPHAGEAL ECHOCARDIOGRAM WITH POSSIBLE CARDIOVERSION (JOSE W/ POSS CARDIOVERSION) N/A 6/19/2023    Procedure: Transesophageal echo (JOSE) intra-procedure log documentation;  Surgeon: Keo Jenkins MD;  Location: [facility] CATH LAB/EP;  Service: Cardiology;  Laterality: N/A;       Review of patient's allergies indicates:   Allergen Reactions    Ace inhibitors Rash       No current facility-administered medications on file prior to  encounter.     Current Outpatient Medications on File Prior to Encounter   Medication Sig    albuterol (PROVENTIL/VENTOLIN HFA) 90 mcg/actuation inhaler INHALE 2 PUFFS INTO THE LUNGS EVERY 6 HOURS AS NEEDED FOR WHEEZING (Patient not taking: Reported on 9/9/2024)    albuterol-ipratropium (DUO-NEB) 2.5 mg-0.5 mg/3 mL nebulizer solution Take 3 mLs by nebulization every 6 (six) hours as needed for Wheezing or Shortness of Breath (or cough). Rescue (Patient not taking: Reported on 9/9/2024)    amLODIPine (NORVASC) 5 MG tablet Take 1 tablet (5 mg total) by mouth once daily.    apixaban (ELIQUIS) 5 mg Tab Take 1 tablet (5 mg total) by mouth 2 (two) times daily.    carvediloL (COREG) 25 MG tablet Take 1 tablet (25 mg total) by mouth 2 (two) times daily with meals.    clopidogreL (PLAVIX) 75 mg tablet Take 1 tablet (75 mg total) by mouth once daily.    coenzyme Q10 100 mg capsule Take 100 mg by mouth once daily.    eplerenone (INSPRA) 50 MG Tab Take 1 tablet (50 mg total) by mouth once daily.    hydrALAZINE (APRESOLINE) 50 MG tablet Take 1 tablet (50 mg total) by mouth every 8 (eight) hours.    hydrOXYzine pamoate (VISTARIL) 25 MG Cap TAKE 1 CAPSULE(25 MG) BY MOUTH EVERY NIGHT AS NEEDED FOR INSOMNIA OR ANXIETY    isosorbide mononitrate (IMDUR) 30 MG 24 hr tablet Take 1 tablet (30 mg total) by mouth once daily.    levothyroxine (SYNTHROID) 75 MCG tablet Take 1 tablet (75 mcg total) by mouth before breakfast.    nitroGLYCERIN (NITROSTAT) 0.4 MG SL tablet Place 1 tablet (0.4 mg total) under the tongue every 5 (five) minutes as needed for Chest pain (for a max of 3 tabs in 15 minutes).    ranolazine (RANEXA) 1,000 mg Tb12 Take 1 tablet (1,000 mg total) by mouth 2 (two) times daily.    rosuvastatin (CRESTOR) 40 MG Tab Take 1 tablet (40 mg total) by mouth every evening.     Family History       Problem Relation (Age of Onset)    Aneurysm Mother    Cancer Father    Diabetes Sister    Heart disease Mother    Hypertension Mother,  Sister    No Known Problems Brother, Son          Tobacco Use    Smoking status: Former     Current packs/day: 0.00     Types: Cigarettes     Quit date: 1999     Years since quittin.4    Smokeless tobacco: Never   Substance and Sexual Activity    Alcohol use: No     Alcohol/week: 0.0 standard drinks of alcohol    Drug use: No    Sexual activity: Yes     Partners: Female     Review of Systems   Constitutional:  Positive for activity change and fatigue.   HENT:  Positive for congestion.    Respiratory:  Positive for cough, chest tightness and shortness of breath.    Cardiovascular:  Positive for chest pain.   Gastrointestinal: Negative.    Endocrine: Negative.    Genitourinary: Negative.    Musculoskeletal: Negative.    Allergic/Immunologic: Negative.    Neurological: Negative.    Hematological: Negative.    All other systems reviewed and are negative.    Objective:     Vital Signs (Most Recent):  Temp: 98.9 °F (37.2 °C) (24 140)  Pulse: 73 (24)  Resp: 19 (24)  BP: (!) 116/57 (24)  SpO2: 98 % (Simultaneous filing. User may not have seen previous data.) (24) Vital Signs (24h Range):  Temp:  [98.9 °F (37.2 °C)] 98.9 °F (37.2 °C)  Pulse:  [73-79] 73  Resp:  [19-24] 19  SpO2:  [84 %-98 %] 98 %  BP: ()/(55-57) 116/57     Weight: 77.1 kg (170 lb)  Body mass index is 24.39 kg/m².     Physical Exam  Constitutional:       Appearance: Normal appearance.   HENT:      Head: Normocephalic and atraumatic.      Mouth/Throat:      Mouth: Mucous membranes are moist.   Eyes:      Extraocular Movements: Extraocular movements intact.      Pupils: Pupils are equal, round, and reactive to light.   Cardiovascular:      Rate and Rhythm: Normal rate and regular rhythm.   Pulmonary:      Breath sounds: Rhonchi present.   Abdominal:      Palpations: Abdomen is soft.   Musculoskeletal:         General: Normal range of motion.      Cervical back: Normal range of motion.   Skin:      General: Skin is warm.   Neurological:      General: No focal deficit present.      Mental Status: He is alert.   Psychiatric:         Mood and Affect: Mood normal.              CRANIAL NERVES     CN III, IV, VI   Pupils are equal, round, and reactive to light.       Significant Labs: All pertinent labs within the past 24 hours have been reviewed.  Recent Lab Results         09/13/24  1748   09/13/24  1511        Albumin   3.3       ALP   62       ALT   7       Anion Gap   13       AST   15       Baso #   0.01       Basophil %   0.1       BILIRUBIN TOTAL   0.7  Comment: For infants and newborns, interpretation of results should be based  on gestational age, weight and in agreement with clinical  observations.    Premature Infant recommended reference ranges:  Up to 24 hours.............<8.0 mg/dL  Up to 48 hours............<12.0 mg/dL  3-5 days..................<15.0 mg/dL  6-29 days.................<15.0 mg/dL         BNP   751  Comment: Values of less than 100 pg/ml are consistent with non-CHF populations.       BUN   31       Calcium   9.2       Chloride   94       CO2   28       Creatinine   4.7       Differential Method   Automated       eGFR   13       Eos #   0.0       Eos %   0.1       Glucose   93       Gran # (ANC)   6.3       Gran %   80.5       Hematocrit   29.5       Hemoglobin   9.7       Immature Grans (Abs)   0.03  Comment: Mild elevation in immature granulocytes is non specific and   can be seen in a variety of conditions including stress response,   acute inflammation, trauma and pregnancy. Correlation with other   laboratory and clinical findings is essential.         Immature Granulocytes   0.4       Lymph #   0.4       Lymph %   5.3       Magnesium    1.8       MCH   28.8       MCHC   32.9       MCV   88       Mono #   1.1       Mono %   13.6       MPV   10.7       nRBC   0       Phosphorus Level   3.8       Platelet Count   129       Potassium   3.3       PROTEIN TOTAL   7.2       RBC   3.37        RDW   15.2       Sodium   135       Troponin I 0.062  Comment: The reference interval for Troponin I represents the 99th percentile   cutoff   for our facility and is consistent with 3rd generation assay   performance.     0.069  Comment: The reference interval for Troponin I represents the 99th percentile   cutoff   for our facility and is consistent with 3rd generation assay   performance.         WBC   7.88               Significant Imaging: I have reviewed all pertinent imaging results/findings within the past 24 hours.  Assessment/Plan:     * COPD exacerbation  Patient's COPD is with exacerbation noted by continued dyspnea currently.  Patient is currently on COPD Pathway. Continue scheduled inhalers Steroids, Antibiotics, and Supplemental oxygen and monitor respiratory status closely.     ESRD (end stage renal disease) on dialysis  Creatine stable for now. BMP reviewed- noted Estimated Creatinine Clearance: 16.6 mL/min (A) (based on SCr of 4.7 mg/dL (H)). according to latest data. Based on current GFR, CKD stage is end stage.  Monitor UOP and serial BMP and adjust therapy as needed. Renally dose meds. Avoid nephrotoxic medications and procedures.    Chest pain  Trend troponins.  Aspirin,sublingual nitroglycerin p.r.n. chest pain.  Inpatient Cardiology consultation.      Paroxysmal atrial fibrillation  Patient with Long standing persistent (>12 months) atrial fibrillation which is controlled currently with Beta Blocker. Patient is currently in sinus rhythm.[id number] Score: 1. Anticoagulation indicated. Anticoagulation done with Eliquis .    HTN (hypertension)  Chronic, controlled. Latest blood pressure and vitals reviewed-     Temp:  [98.9 °F (37.2 °C)]   Pulse:  [73-79]   Resp:  [19-24]   BP: ()/(55-57)   SpO2:  [84 %-98 %] .   Home meds for hypertension were reviewed and noted below.   Hypertension Medications               amLODIPine (NORVASC) 5 MG tablet Take 1 tablet (5 mg total) by mouth once  daily.    carvediloL (COREG) 25 MG tablet Take 1 tablet (25 mg total) by mouth 2 (two) times daily with meals.    eplerenone (INSPRA) 50 MG Tab Take 1 tablet (50 mg total) by mouth once daily.    hydrALAZINE (APRESOLINE) 50 MG tablet Take 1 tablet (50 mg total) by mouth every 8 (eight) hours.    isosorbide mononitrate (IMDUR) 30 MG 24 hr tablet Take 1 tablet (30 mg total) by mouth once daily.    nitroGLYCERIN (NITROSTAT) 0.4 MG SL tablet Place 1 tablet (0.4 mg total) under the tongue every 5 (five) minutes as needed for Chest pain (for a max of 3 tabs in 15 minutes).            While in the hospital, will manage blood pressure as follows; Continue home antihypertensive regimen    Will utilize p.r.n. blood pressure medication only if patient's blood pressure greater than 180/110 and he develops symptoms such as worsening chest pain or shortness of breath.      VTE Risk Mitigation (From admission, onward)           Ordered     IP VTE HIGH RISK PATIENT  Once         09/13/24 2248     Place sequential compression device  Until discontinued         09/13/24 2248                                    Yusuf Chase MD  Department of Hospital Medicine  [place] - Emergency Dept

## 2024-09-14 NOTE — ASSESSMENT & PLAN NOTE
Trend troponins.  Aspirin,sublingual nitroglycerin p.r.n. chest pain.  Inpatient Cardiology consultation, plan for cardiac cath on Monday

## 2024-09-14 NOTE — PROGRESS NOTES
VIRTUAL NURSE:  Cued into patient's room.  Permission received per patient to turn camera to view patient.  Introduced as VN that will be working with floor nurse and nursing assistant.  Educated patient on VN's role in patient care and  VIP model.  Plan of care reviewed with patient.  Education per flowsheet.   Informed patient that staff will round on them every 2 hours but to use call light for any other needs they may have; informed of fall risk and fall precautions.  Patient verbalized understanding.  Call light within reach; bed siderails up x3.  Opportunity given for questions and questions answered.  Admission assessment questions answered.  Patient denies complaints or any needs at this time. Instructed to call for assistance.  Will cont to monitor and intervene as needed.    Labs, notes, orders, and careplan reviewed.          09/14/24 1628   Admission   Initial VN Admission Questions Complete   Communication Issues? None   Shift   Virtual Nurse - Rounding Complete   Pain Management Interventions quiet environment facilitated;relaxation techniques promoted   Virtual Nurse - Patient Verbalized Approval Of VN Rounding;Camera Use   Type of Frequent Check   Type Patient Rounds   Safety/Activity   Patient Rounds bed in low position;bed wheels locked;call light in patient/parent reach;clutter free environment maintained;ID band on;visualized patient;placement of personal items at bedside   Safety Promotion/Fall Prevention Fall Risk reviewed with patient/family   Positioning   Body Position supine   Head of Bed (HOB) Positioning HOB at 30-45 degrees

## 2024-09-14 NOTE — ASSESSMENT & PLAN NOTE
Patient with Long standing persistent (>12 months) atrial fibrillation which is controlled currently with Beta Blocker. Patient is currently in sinus rhythm.[id number] Score: 1. Anticoagulation indicated. Anticoagulation done with heparin drip , Eliquis is on hold because patient is planned for cardiac catheterization on Monday .

## 2024-09-14 NOTE — ASSESSMENT & PLAN NOTE
Chronic, controlled. Latest blood pressure and vitals reviewed-     Temp:  [98.9 °F (37.2 °C)]   Pulse:  [73-79]   Resp:  [19-24]   BP: ()/(55-57)   SpO2:  [84 %-98 %] .   Home meds for hypertension were reviewed and noted below.   Hypertension Medications               amLODIPine (NORVASC) 5 MG tablet Take 1 tablet (5 mg total) by mouth once daily.    carvediloL (COREG) 25 MG tablet Take 1 tablet (25 mg total) by mouth 2 (two) times daily with meals.    eplerenone (INSPRA) 50 MG Tab Take 1 tablet (50 mg total) by mouth once daily.    hydrALAZINE (APRESOLINE) 50 MG tablet Take 1 tablet (50 mg total) by mouth every 8 (eight) hours.    isosorbide mononitrate (IMDUR) 30 MG 24 hr tablet Take 1 tablet (30 mg total) by mouth once daily.    nitroGLYCERIN (NITROSTAT) 0.4 MG SL tablet Place 1 tablet (0.4 mg total) under the tongue every 5 (five) minutes as needed for Chest pain (for a max of 3 tabs in 15 minutes).            While in the hospital, will manage blood pressure as follows; Continue home antihypertensive regimen    Will utilize p.r.n. blood pressure medication only if patient's blood pressure greater than 180/110 and he develops symptoms such as worsening chest pain or shortness of breath.

## 2024-09-14 NOTE — ED NOTES
Report rcv'd from night RN, assumed care. Pt resting in bed quietly, chest rising and falling, bed in lowest position. Pt on RA, denies need for O2 or CP at this time. Denies needs at this time.

## 2024-09-14 NOTE — SUBJECTIVE & OBJECTIVE
Past Medical History:   Diagnosis Date    Acute congestive heart failure 9/13/2024    Allergy     Anemia     Anticoagulant long-term use     Arthritis     CKD (chronic kidney disease) stage 4, GFR 15-29 ml/min     COPD (chronic obstructive pulmonary disease) 08/20/2021    Coronary artery disease     Heart attack 10/04/2019    Hematuria     Hemothorax     Hyperlipidemia     Hypertension     Hyperuricemia     Hypocalcemia     Hypokalemia     Hypophosphatemia     Hypothyroidism 3/2/2023    PAD (peripheral artery disease)     Proteinuria     Vitamin D deficiency        Past Surgical History:   Procedure Laterality Date    ABDOMINAL AORTOGRAPHY N/A 5/10/2019    Procedure: AORTOGRAM-ABDOMINAL;  Surgeon: Keo Jenkins MD;  Location: [facility] CATH LAB/EP;  Service: Cardiology;  Laterality: N/A;  RSFA intervention     AORTOGRAPHY WITH SERIALOGRAPHY N/A 12/3/2021    Procedure: AORTOGRAM, WITH SERIALOGRAPHY;  Surgeon: Keo Jenkins MD;  Location: [facility] CATH LAB/EP;  Service: Cardiology;  Laterality: N/A;    AORTOGRAPHY WITH SERIALOGRAPHY N/A 4/1/2022    Procedure: AORTOGRAM, WITH SERIALOGRAPHY;  Surgeon: Keo Jenkins MD;  Location: [facility] CATH LAB/EP;  Service: Cardiology;  Laterality: N/A;    ATHERECTOMY, CORONARY N/A 4/25/2023    Procedure: Atherectomy-coronary;  Surgeon: Keo Jenkins MD;  Location: [facility] CATH LAB/EP;  Service: Cardiology;  Laterality: N/A;    BONE MARROW BIOPSY Right 10/12/2021    Procedure: BIOPSY-BONE MARROW;  Surgeon: Naseem Woods MD;  Location: [facility] OR;  Service: Oncology;  Laterality: Right;    CARDIAC CATHETERIZATION      CATARACT EXTRACTION      CATARACT EXTRACTION W/  INTRAOCULAR LENS IMPLANT Right 6/8/2020    Procedure: EXTRACTION, CATARACT, WITH IOL INSERTION;  Surgeon: Tin Light MD;  Location: [facility] OR;  Service: Ophthalmology;  Laterality: Right;    CATARACT EXTRACTION W/  INTRAOCULAR LENS IMPLANT Left 7/2/2020    Procedure: EXTRACTION, CATARACT, WITH IOL INSERTION;  Surgeon: Tin TORRE  MD Nadege;  Location: [facility] [place] OR;  Service: Ophthalmology;  Laterality: Left;    COLONOSCOPY N/A 1/6/2022    Procedure: COLONOSCOPY;  Surgeon: Kathe Penaloza MD;  Location: [facility] (2ND FLR);  Service: Endoscopy;  Laterality: N/A;  COVID test at [facility] on 1/3-GT  okay to hold Plavix for 5 days and aspirin per Dr. Becerra  2nd floor due toextensive cardiac history   instructions mailed and my [facility] portal -     CORONARY ANGIOGRAPHY N/A 3/29/2019    Procedure: ANGIOGRAM, CORONARY ARTERY;  Surgeon: Keo Jenkins MD;  Location: [facility] CATH LAB/EP;  Service: Cardiology;  Laterality: N/A;    CORONARY ANGIOGRAPHY Left 10/11/2019    Procedure: ANGIOGRAM, CORONARY ARTERY;  Surgeon: Keo Jenkins MD;  Location: [facility] CATH LAB/EP;  Service: Cardiology;  Laterality: Left;    CORONARY ANGIOGRAPHY N/A 4/10/2023    Procedure: ANGIOGRAM, CORONARY ARTERY;  Surgeon: Keo Jenkins MD;  Location: [facility] CATH LAB/EP;  Service: Cardiology;  Laterality: N/A;    CORONARY ANGIOGRAPHY N/A 6/26/2024    Procedure: ANGIOGRAM, CORONARY ARTERY;  Surgeon: Enoch Tirado MD;  Location: [facility] CATH LAB/EP;  Service: Cardiology;  Laterality: N/A;    CORONARY ANGIOPLASTY WITH STENT PLACEMENT  03/29/2019    mid and distal RCA    ESOPHAGOGASTRODUODENOSCOPY N/A 1/6/2022    Procedure: EGD (ESOPHAGOGASTRODUODENOSCOPY);  Surgeon: Kathe Penaloza MD;  Location: [facility] (2ND FLR);  Service: Endoscopy;  Laterality: N/A;    EYE SURGERY      INSERTION OF TUNNELED CENTRAL VENOUS HEMODIALYSIS CATHETER N/A 2/9/2024    Procedure: INSERTION, CATHETER, HEMODIALYSIS, DUAL LUMEN;  Surgeon: Wang Mendieta MD;  Location: [facility] OR;  Service: General;  Laterality: N/A;    INSTANTANEOUS WAVE-FREE RATIO (IFR) N/A 4/25/2023    Procedure: Instantaneous Wave-Free Ratio (IFR);  Surgeon: Keo Jenkins MD;  Location: [facility] CATH LAB/EP;  Service: Cardiology;  Laterality: N/A;    INTRAVASCULAR ULTRASOUND, NON-CORONARY  8/12/2022    Procedure: Intravascular Ultrasound,  Non-Coronary;  Surgeon: Keo Jenkins MD;  Location: [facility] CATH LAB/EP;  Service: Cardiology;;    IVUS, CORONARY  4/25/2023    Procedure: IVUS, Coronary;  Surgeon: Keo Jenkins MD;  Location: [facility] CATH LAB/EP;  Service: Cardiology;;    IVUS, CORONARY  5/16/2023    Procedure: IVUS, Coronary;  Surgeon: Keo Jenkins MD;  Location: [facility] CATH LAB/EP;  Service: Cardiology;;  CX    LEFT HEART CATHETERIZATION N/A 3/29/2019    Procedure: Left heart cath;  Surgeon: Keo Jenkins MD;  Location: [facility] CATH LAB/EP;  Service: Cardiology;  Laterality: N/A;    LEFT HEART CATHETERIZATION Left 10/8/2019    Procedure: Left heart cath;  Surgeon: Keo Jenkins MD;  Location: [facility] CATH LAB/EP;  Service: Cardiology;  Laterality: Left;    LEFT HEART CATHETERIZATION Left 4/10/2023    Procedure: Left heart cath;  Surgeon: Keo Jenkins MD;  Location: [facility] CATH LAB/EP;  Service: Cardiology;  Laterality: Left;    LEFT HEART CATHETERIZATION Left 4/25/2023    Procedure: Left heart cath;  Surgeon: Keo Jenkins MD;  Location: [facility] CATH LAB/EP;  Service: Cardiology;  Laterality: Left;    LEFT HEART CATHETERIZATION Left 5/16/2023    Procedure: Left heart cath;  Surgeon: Keo Jenkins MD;  Location: [facility] CATH LAB/EP;  Service: Cardiology;  Laterality: Left;    LEFT HEART CATHETERIZATION Left 6/26/2024    Procedure: Left heart cath;  Surgeon: Enoch Tirado MD;  Location: [facility] CATH LAB/EP;  Service: Cardiology;  Laterality: Left;    PERCUTANEOUS CORONARY INTERVENTION, ARTERY N/A 6/26/2024    Procedure: Percutaneous coronary intervention;  Surgeon: Enoch Tirado MD;  Location: [facility] CATH LAB/EP;  Service: Cardiology;  Laterality: N/A;    PERCUTANEOUS TRANSLUMINAL ANGIOPLASTY (PTA) OF PERIPHERAL VESSEL N/A 7/12/2019    Procedure: PTA, PERIPHERAL VESSEL;  Surgeon: Keo Jenkins MD;  Location: [facility] CATH LAB/EP;  Service: Cardiology;  Laterality: N/A;    PERCUTANEOUS TRANSLUMINAL ANGIOPLASTY (PTA) OF PERIPHERAL VESSEL Left  5/20/2022    Procedure: PTA, PERIPHERAL VESSEL;  Surgeon: Keo Jenkins MD;  Location: [facility] CATH LAB/EP;  Service: Cardiology;  Laterality: Left;    PERCUTANEOUS TRANSLUMINAL ANGIOPLASTY (PTA) OF PERIPHERAL VESSEL Right 8/12/2022    Procedure: PTA, PERIPHERAL VESSEL;  Surgeon: Keo Jenkins MD;  Location: [facility] CATH LAB/EP;  Service: Cardiology;  Laterality: Right;    PERCUTANEOUS TRANSLUMINAL BALLOON ANGIOPLASTY OF CORONARY ARTERY  4/25/2023    Procedure: Angioplasty-coronary;  Surgeon: Keo Jenkins MD;  Location: [facility] CATH LAB/EP;  Service: Cardiology;;    PLACEMENT OF ARTERIOVENOUS GRAFT Left 4/15/2024    Procedure: INSERTION, GRAFT, ARTERIOVENOUS;  Surgeon: Scott Pascual MD;  Location: [facility];  Service: General;  Laterality: Left;    PTCA, SINGLE VESSEL  5/16/2023    Procedure: PTCA, Single Vessel;  Surgeon: Keo Jenkins MD;  Location: [facility] CATH LAB/EP;  Service: Cardiology;;  CX    PTCA, SINGLE VESSEL  6/26/2024    Procedure: PTCA, Single Vessel;  Surgeon: Enoch Tirado MD;  Location: [facility] CATH LAB/EP;  Service: Cardiology;;    REMOVAL OF HEMODIALYSIS CATHETER Right 2/9/2024    Procedure: REMOVAL, CATHETER, HEMODIALYSIS;  Surgeon: Wang Mendieta MD;  Location: [facility] OR;  Service: General;  Laterality: Right;    REMOVAL OF TUNNELED CENTRAL VENOUS CATHETER (CVC) Right 5/20/2024    Procedure: REMOVAL, CATHETER, CENTRAL VENOUS, TUNNELED;  Surgeon: Scott Pascual MD;  Location: [facility] OR;  Service: General;  Laterality: Right;    REPAIR, CHRONIC TOTAL OCCLUSION, CORONARY  6/26/2024    Procedure: Repair, Chronic Total Occlusion, Coronary;  Surgeon: Enoch Tirado MD;  Location: [facility] CATH LAB/EP;  Service: Cardiology;;    STENT, DRUG ELUTING, SINGLE VESSEL, CORONARY  4/25/2023    Procedure: Stent, Drug Eluting, Single Vessel, Coronary;  Surgeon: Keo Jenkins MD;  Location: [facility] CATH LAB/EP;  Service: Cardiology;;    STENT, DRUG ELUTING, SINGLE VESSEL, CORONARY  5/16/2023     Procedure: Stent, Drug Eluting, Single Vessel, Coronary;  Surgeon: Keo Jenkins MD;  Location: [facility] CATH LAB/EP;  Service: Cardiology;;  CX    TRANSESOPHAGEAL ECHOCARDIOGRAM WITH POSSIBLE CARDIOVERSION (JOSE W/ POSS CARDIOVERSION) N/A 6/19/2023    Procedure: Transesophageal echo (JOSE) intra-procedure log documentation;  Surgeon: Keo Jenkins MD;  Location: [facility] CATH LAB/EP;  Service: Cardiology;  Laterality: N/A;       Review of patient's allergies indicates:   Allergen Reactions    Ace inhibitors Rash       No current facility-administered medications on file prior to encounter.     Current Outpatient Medications on File Prior to Encounter   Medication Sig    albuterol (PROVENTIL/VENTOLIN HFA) 90 mcg/actuation inhaler INHALE 2 PUFFS INTO THE LUNGS EVERY 6 HOURS AS NEEDED FOR WHEEZING (Patient not taking: Reported on 9/9/2024)    albuterol-ipratropium (DUO-NEB) 2.5 mg-0.5 mg/3 mL nebulizer solution Take 3 mLs by nebulization every 6 (six) hours as needed for Wheezing or Shortness of Breath (or cough). Rescue (Patient not taking: Reported on 9/9/2024)    amLODIPine (NORVASC) 5 MG tablet Take 1 tablet (5 mg total) by mouth once daily.    apixaban (ELIQUIS) 5 mg Tab Take 1 tablet (5 mg total) by mouth 2 (two) times daily.    carvediloL (COREG) 25 MG tablet Take 1 tablet (25 mg total) by mouth 2 (two) times daily with meals.    clopidogreL (PLAVIX) 75 mg tablet Take 1 tablet (75 mg total) by mouth once daily.    coenzyme Q10 100 mg capsule Take 100 mg by mouth once daily.    eplerenone (INSPRA) 50 MG Tab Take 1 tablet (50 mg total) by mouth once daily.    hydrALAZINE (APRESOLINE) 50 MG tablet Take 1 tablet (50 mg total) by mouth every 8 (eight) hours.    hydrOXYzine pamoate (VISTARIL) 25 MG Cap TAKE 1 CAPSULE(25 MG) BY MOUTH EVERY NIGHT AS NEEDED FOR INSOMNIA OR ANXIETY    isosorbide mononitrate (IMDUR) 30 MG 24 hr tablet Take 1 tablet (30 mg total) by mouth once daily.    levothyroxine (SYNTHROID) 75 MCG tablet  Take 1 tablet (75 mcg total) by mouth before breakfast.    nitroGLYCERIN (NITROSTAT) 0.4 MG SL tablet Place 1 tablet (0.4 mg total) under the tongue every 5 (five) minutes as needed for Chest pain (for a max of 3 tabs in 15 minutes).    ranolazine (RANEXA) 1,000 mg Tb12 Take 1 tablet (1,000 mg total) by mouth 2 (two) times daily.    rosuvastatin (CRESTOR) 40 MG Tab Take 1 tablet (40 mg total) by mouth every evening.     Family History       Problem Relation (Age of Onset)    Aneurysm Mother    Cancer Father    Diabetes Sister    Heart disease Mother    Hypertension Mother, Sister    No Known Problems Brother, Son          Tobacco Use    Smoking status: Former     Current packs/day: 0.00     Types: Cigarettes     Quit date: 1999     Years since quittin.4    Smokeless tobacco: Never   Substance and Sexual Activity    Alcohol use: No     Alcohol/week: 0.0 standard drinks of alcohol    Drug use: No    Sexual activity: Yes     Partners: Female     Review of Systems   Constitutional:  Positive for activity change and fatigue.   HENT:  Positive for congestion.    Respiratory:  Positive for cough, chest tightness and shortness of breath.    Cardiovascular:  Positive for chest pain.   Gastrointestinal: Negative.    Endocrine: Negative.    Genitourinary: Negative.    Musculoskeletal: Negative.    Allergic/Immunologic: Negative.    Neurological: Negative.    Hematological: Negative.    All other systems reviewed and are negative.    Objective:     Vital Signs (Most Recent):  Temp: 98.9 °F (37.2 °C) (24 1404)  Pulse: 73 (24)  Resp: 19 (24)  BP: (!) 116/57 (24)  SpO2: 98 % (Simultaneous filing. User may not have seen previous data.) (24) Vital Signs (24h Range):  Temp:  [98.9 °F (37.2 °C)] 98.9 °F (37.2 °C)  Pulse:  [73-79] 73  Resp:  [-24] 19  SpO2:  [84 %-98 %] 98 %  BP: ()/(55-57) 116/57     Weight: 77.1 kg (170 lb)  Body mass index is 24.39 kg/m².      Physical Exam  Constitutional:       Appearance: Normal appearance.   HENT:      Head: Normocephalic and atraumatic.      Mouth/Throat:      Mouth: Mucous membranes are moist.   Eyes:      Extraocular Movements: Extraocular movements intact.      Pupils: Pupils are equal, round, and reactive to light.   Cardiovascular:      Rate and Rhythm: Normal rate and regular rhythm.   Pulmonary:      Breath sounds: Rhonchi present.   Abdominal:      Palpations: Abdomen is soft.   Musculoskeletal:         General: Normal range of motion.      Cervical back: Normal range of motion.   Skin:     General: Skin is warm.   Neurological:      General: No focal deficit present.      Mental Status: He is alert.   Psychiatric:         Mood and Affect: Mood normal.              CRANIAL NERVES     CN III, IV, VI   Pupils are equal, round, and reactive to light.       Significant Labs: All pertinent labs within the past 24 hours have been reviewed.  Recent Lab Results         09/13/24  1748   09/13/24  1511        Albumin   3.3       ALP   62       ALT   7       Anion Gap   13       AST   15       Baso #   0.01       Basophil %   0.1       BILIRUBIN TOTAL   0.7  Comment: For infants and newborns, interpretation of results should be based  on gestational age, weight and in agreement with clinical  observations.    Premature Infant recommended reference ranges:  Up to 24 hours.............<8.0 mg/dL  Up to 48 hours............<12.0 mg/dL  3-5 days..................<15.0 mg/dL  6-29 days.................<15.0 mg/dL         BNP   751  Comment: Values of less than 100 pg/ml are consistent with non-CHF populations.       BUN   31       Calcium   9.2       Chloride   94       CO2   28       Creatinine   4.7       Differential Method   Automated       eGFR   13       Eos #   0.0       Eos %   0.1       Glucose   93       Gran # (ANC)   6.3       Gran %   80.5       Hematocrit   29.5       Hemoglobin   9.7       Immature Grans (Abs)    0.03  Comment: Mild elevation in immature granulocytes is non specific and   can be seen in a variety of conditions including stress response,   acute inflammation, trauma and pregnancy. Correlation with other   laboratory and clinical findings is essential.         Immature Granulocytes   0.4       Lymph #   0.4       Lymph %   5.3       Magnesium    1.8       MCH   28.8       MCHC   32.9       MCV   88       Mono #   1.1       Mono %   13.6       MPV   10.7       nRBC   0       Phosphorus Level   3.8       Platelet Count   129       Potassium   3.3       PROTEIN TOTAL   7.2       RBC   3.37       RDW   15.2       Sodium   135       Troponin I 0.062  Comment: The reference interval for Troponin I represents the 99th percentile   cutoff   for our facility and is consistent with 3rd generation assay   performance.     0.069  Comment: The reference interval for Troponin I represents the 99th percentile   cutoff   for our facility and is consistent with 3rd generation assay   performance.         WBC   7.88               Significant Imaging: I have reviewed all pertinent imaging results/findings within the past 24 hours.

## 2024-09-14 NOTE — ASSESSMENT & PLAN NOTE
Patient with Long standing persistent (>12 months) atrial fibrillation which is controlled currently with Beta Blocker. Patient is currently in sinus rhythm.[id number] Score: 1. Anticoagulation indicated. Anticoagulation done with Eliquis .

## 2024-09-14 NOTE — ASSESSMENT & PLAN NOTE
Chronic, controlled. Latest blood pressure and vitals reviewed-     Temp:  [97.9 °F (36.6 °C)-98.9 °F (37.2 °C)]   Pulse:  [67-79]   Resp:  [16-24]   BP: ()/(54-63)   SpO2:  [84 %-98 %] .   Home meds for hypertension were reviewed and noted below.   Hypertension Medications               amLODIPine (NORVASC) 5 MG tablet Take 1 tablet (5 mg total) by mouth once daily.    carvediloL (COREG) 25 MG tablet Take 1 tablet (25 mg total) by mouth 2 (two) times daily with meals.    eplerenone (INSPRA) 50 MG Tab Take 1 tablet (50 mg total) by mouth once daily.    hydrALAZINE (APRESOLINE) 50 MG tablet Take 1 tablet (50 mg total) by mouth every 8 (eight) hours.    isosorbide mononitrate (IMDUR) 30 MG 24 hr tablet Take 1 tablet (30 mg total) by mouth once daily.    nitroGLYCERIN (NITROSTAT) 0.4 MG SL tablet Place 1 tablet (0.4 mg total) under the tongue every 5 (five) minutes as needed for Chest pain (for a max of 3 tabs in 15 minutes).            While in the hospital, will manage blood pressure as follows; Continue home antihypertensive regimen    Will utilize p.r.n. blood pressure medication only if patient's blood pressure greater than 180/110 and he develops symptoms such as worsening chest pain or shortness of breath.

## 2024-09-14 NOTE — ED NOTES
Ambulated pt around the ER while assessing O2 sats. Sats dropped to 84% while ambulating. When we arived back to the room the pt sat on the bed took some slow deep breaths and sats only increased to 89% on RA. Placed pt on 2L via NC. Sating now sating 98% on 2L. MD Ninfa notified

## 2024-09-14 NOTE — ED NOTES
Pt resting in bed quietly, chest rising and falling, bed in lowest position. Denies needs at this time.

## 2024-09-14 NOTE — SUBJECTIVE & OBJECTIVE
Interval History:   I saw and examined patient at bedside today  He feels fine complaining of chest pain on both sides mostly when he takes a deep breath or any cough  Patient shortness for breath has improved comparison when he came in  Patient was seen by the Cardiology who recommended to start heparin drip and plan for cardiac catheterization on Monday    Review of Systems   Constitutional: Negative.    HENT: Negative.     Respiratory:  Positive for cough, chest tightness, shortness of breath and wheezing.    Cardiovascular:  Positive for chest pain.   Genitourinary: Negative.    Musculoskeletal: Negative.    Neurological: Negative.    Psychiatric/Behavioral: Negative.       Objective:     Vital Signs (Most Recent):  Temp: 98.2 °F (36.8 °C) (09/14/24 0730)  Pulse: 71 (09/14/24 1102)  Resp: 20 (09/14/24 1102)  BP: (!) 122/59 (09/14/24 1102)  SpO2: (!) 94 % (09/14/24 1102) Vital Signs (24h Range):  Temp:  [97.9 °F (36.6 °C)-98.9 °F (37.2 °C)] 98.2 °F (36.8 °C)  Pulse:  [67-79] 71  Resp:  [16-24] 20  SpO2:  [84 %-98 %] 94 %  BP: ()/(54-63) 122/59     Weight: 77.1 kg (169 lb 15.6 oz)  Body mass index is 24.39 kg/m².    Intake/Output Summary (Last 24 hours) at 9/14/2024 1320  Last data filed at 9/14/2024 0951  Gross per 24 hour   Intake 100 ml   Output 300 ml   Net -200 ml         Physical Exam  Constitutional:       Appearance: Normal appearance.   HENT:      Head: Normocephalic and atraumatic.   Cardiovascular:      Rate and Rhythm: Normal rate.   Pulmonary:      Effort: Pulmonary effort is normal.      Breath sounds: Wheezing present.   Musculoskeletal:         General: Normal range of motion.   Skin:     General: Skin is warm.   Neurological:      General: No focal deficit present.      Mental Status: He is alert. Mental status is at baseline.   Psychiatric:         Mood and Affect: Mood normal.         Behavior: Behavior normal.             Significant Labs: All pertinent labs within the past 24 hours have  been reviewed.  BMP:   Recent Labs   Lab 09/13/24  1511 09/14/24  0433   GLU 93 96   * 133*   K 3.3* 3.1*   CL 94* 94*   CO2 28 25   BUN 31* 36*   CREATININE 4.7* 4.9*   CALCIUM 9.2 9.0   MG 1.8  --      CBC:   Recent Labs   Lab 09/13/24  1511 09/14/24  0433   WBC 7.88 7.06   HGB 9.7* 9.3*   HCT 29.5* 27.9*   * 123*       Significant Imaging: I have reviewed all pertinent imaging results/findings within the past 24 hours.

## 2024-09-14 NOTE — HPI
This is a 64-year-old male with a history of end-stage renal disease on renal dialysis, COPD, hypertension, diabetes mellitus type, and CHF who presents to emergency department with 5 days' history of having worsening shortness of breath and chest discomfort.  Patient was seen in the ED and he was given DuoNebs and Lasix and he had improvement.  However he still was desatting to about 84% and complaining of chest discomfort.  Patient was put on supplemental oxygen and due to patient's presenting symptoms he will require admission for further management.    At time of my examination patient denied any headache, fever, chills, hemoptysis, a complaint of shortness of breath and chest pain.

## 2024-09-14 NOTE — ASSESSMENT & PLAN NOTE
Patient's COPD is with exacerbation noted by continued dyspnea currently.  Patient is currently on COPD Pathway. Continue scheduled inhalers Steroids and Supplemental oxygen and monitor respiratory status closely.     Improved   Prednisone 60 mg po for 5 days total  Patient started feeling better , no wheezing on exam

## 2024-09-14 NOTE — ASSESSMENT & PLAN NOTE
Trend troponins.  Aspirin,sublingual nitroglycerin p.r.n. chest pain.  Inpatient Cardiology consultation.

## 2024-09-14 NOTE — CONSULTS
[facility] Consult Note     Attending Physician: Nima Ervin MD  Cardiology Attending Physician: Enoch Salazar MD  Date of Admit: 9/13/2024  Reason for Consult:     History of Present Illness:       Mr. Gross is a pleasant 65 yo gentleman with hx of HTN, ESRD on HD, PAD with multiple revascularization, complex CAD (mid LAD/prox RCA PCI 3/2019 and prox LCA in 10/2019, bifurcation Lcx-OM PCI with ISR/ s/p PTCA) presents with SOB for 5 days which is similar to his previous presentation required interventions. Per patient he did not missed any of his HD 3 hrs sessions. Denies cp, palpitations, presyncope/dizziness, syncope, orthopnea, PND, bendopnea, decrease ET, NVDC, fever, chills.      History obtained by patient interview and supplemented by nursing documentation and chart review.     PMHx:  has a past medical history of Acute congestive heart failure (9/13/2024), Allergy, Anemia, Anticoagulant long-term use, Arthritis, CKD (chronic kidney disease) stage 4, GFR 15-29 ml/min, COPD (chronic obstructive pulmonary disease) (08/20/2021), Coronary artery disease, Heart attack (10/04/2019), Hematuria, Hemothorax, Hyperlipidemia, Hypertension, Hyperuricemia, Hypocalcemia, Hypokalemia, Hypophosphatemia, Hypothyroidism (3/2/2023), PAD (peripheral artery disease), Proteinuria, and Vitamin D deficiency.   SurgHx:  has a past surgical history that includes Eye surgery; Cardiac catheterization; Left heart catheterization (N/A, 3/29/2019); Coronary angiography (N/A, 3/29/2019); Abdominal aortography (N/A, 5/10/2019); Percutaneous transluminal angioplasty (PTA) of peripheral vessel (N/A, 7/12/2019); Coronary angioplasty with stent (03/29/2019); Left heart catheterization (Left, 10/8/2019); Coronary angiography (Left, 10/11/2019); Cataract extraction; Cataract extraction w/  intraocular lens implant (Right, 6/8/2020); Cataract extraction w/  intraocular lens implant (Left, 7/2/2020); Bone marrow biopsy  (Right, 10/12/2021); Aortography with serialography (N/A, 12/3/2021); Esophagogastroduodenoscopy (N/A, 1/6/2022); Colonoscopy (N/A, 1/6/2022); Aortography with serialography (N/A, 4/1/2022); Percutaneous transluminal angioplasty (PTA) of peripheral vessel (Left, 5/20/2022); Percutaneous transluminal angioplasty (PTA) of peripheral vessel (Right, 8/12/2022); intravascular ultrasound, non-coronary (8/12/2022); Left heart catheterization (Left, 4/10/2023); Coronary angiography (N/A, 4/10/2023); Left heart catheterization (Left, 4/25/2023); instantaneous wave-free ratio (ifr) (N/A, 4/25/2023); atherectomy, coronary (N/A, 4/25/2023); Percutaneous transluminal balloon angioplasty of coronary artery (4/25/2023); stent, drug eluting, single vessel, coronary (4/25/2023); ivus, coronary (4/25/2023); Left heart catheterization (Left, 5/16/2023); ivus, coronary (5/16/2023); ptca, single vessel (5/16/2023); stent, drug eluting, single vessel, coronary (5/16/2023); transesophageal echocardiogram with possible cardioversion (julian w/ poss cardioversion) (N/A, 6/19/2023); Insertion of tunneled central venous hemodialysis catheter (N/A, 2/9/2024); Removal of hemodialysis catheter (Right, 2/9/2024); Placement of arteriovenous graft (Left, 4/15/2024); Removal of tunneled central venous catheter (CVC) (Right, 5/20/2024); Left heart catheterization (Left, 6/26/2024); percutaneous coronary intervention, artery (N/A, 6/26/2024); ptca, single vessel (6/26/2024); repair, chronic total occlusion, coronary (6/26/2024); and Coronary angiography (N/A, 6/26/2024).   FamHx: family history includes Aneurysm in his mother; Cancer in his father; Diabetes in his sister; Heart disease in his mother; Hypertension in his mother and sister; No Known Problems in his brother and son.   SocialHx:  reports that he quit smoking about 25 years ago. His smoking use included cigarettes. He has never used smokeless tobacco. He reports that he does not drink alcohol  "and does not use drugs.  Home Meds:  Current Outpatient Medications   Medication Instructions    albuterol (PROVENTIL/VENTOLIN HFA) 90 mcg/actuation inhaler 2 puffs, Inhalation, Every 6 hours PRN    albuterol-ipratropium (DUO-NEB) 2.5 mg-0.5 mg/3 mL nebulizer solution 3 mLs, Nebulization, Every 6 hours PRN, Rescue    amLODIPine (NORVASC) 5 mg, Oral, Daily    apixaban (ELIQUIS) 5 mg, Oral, 2 times daily    carvediloL (COREG) 25 mg, Oral, 2 times daily with meals    clopidogreL (PLAVIX) 75 mg, Oral, Daily    coenzyme Q10 100 mg, Oral, Daily    eplerenone (INSPRA) 50 mg, Oral, Daily    hydrALAZINE (APRESOLINE) 50 mg, Oral, Every 8 hours    hydrOXYzine pamoate (VISTARIL) 25 MG Cap TAKE 1 CAPSULE(25 MG) BY MOUTH EVERY NIGHT AS NEEDED FOR INSOMNIA OR ANXIETY    isosorbide mononitrate (IMDUR) 30 mg, Oral, Daily    levothyroxine (SYNTHROID) 75 mcg, Oral, Before breakfast    nitroGLYCERIN (NITROSTAT) 0.4 mg, Sublingual, Every 5 min PRN    ranolazine (RANEXA) 1,000 mg, Oral, 2 times daily    rosuvastatin (CRESTOR) 40 mg, Oral, Nightly       Review of Systems: Comprehensive ROS was performed and is negative unless otherwise noted in HPI.     Objective:   Last 24 Hour Vital Signs:  BP  Min: 97/55  Max: 134/61  Temp  Av.4 °F (36.9 °C)  Min: 97.9 °F (36.6 °C)  Max: 98.9 °F (37.2 °C)  Pulse  Av.1  Min: 67  Max: 79  Resp  Av.5  Min: 16  Max: 24  SpO2  Av.8 %  Min: 84 %  Max: 98 %  Height  Av' 10" (177.8 cm)  Min: 5' 10" (177.8 cm)  Max: 5' 10" (177.8 cm)  Weight  Av.1 kg (169 lb 15.8 oz)  Min: 77.1 kg (169 lb 15.6 oz)  Max: 77.1 kg (170 lb)  I/O last 3 completed shifts:  In: 100 [IV Piggyback:100]  Out: -   Physical Examination:  Constitutional: NAD, Atraumatic   HEENT: MMM, no JVD  Cardiovascular: RRR, no murmurs noted, no chest tenderness to palpation, 2+ radial pulses b/l  Pulmonary: normal respiratory effort, CTAB, no crackles, wheezes  Abdominal: S/NT/ND  Musculoskeletal: No lower extremity edema " noted  Neurological: Alert & oriented to self, location, time and situation. No gross neurological deficits  Skin: W/D/I  Psych: Appropriate affect, normal mood    Laboratory:  Personally reviewed    Other Results:  TTE:  Results for orders placed during the hospital encounter of 06/25/24    Echo    Interpretation Summary    Left Ventricle: The left ventricle is mildly dilated. There is eccentric hypertrophy. Regional wall motion abnormalities present. See diagram for wall motion findings. There is mildly reduced systolic function with a visually estimated ejection fraction of 45 - 50%. There is indeterminate diastolic function.Elevated left ventricular filling pressure.    Right Ventricle: Normal right ventricular cavity size. Wall thickness is normal. Systolic function is normal.    Left Atrium: Left atrium is mildly dilated.    Right Atrium: Right atrium is mildly dilated.    Mitral Valve: There is mild to moderate regurgitation.    Pulmonic Valve: There is moderate regurgitation.    Pulmonary Artery: There is pulmonary hypertension. The estimated pulmonary artery systolic pressure is 47 mmHg.    IVC/SVC: Intermediate venous pressure at 8 mmHg.    Stress Testing:   Results for orders placed during the hospital encounter of 03/18/24    Treadmill Stress Test    Interpretation Summary    The ECG portion of the study is negative for ischemia.    The patient reported no chest pain during the stress test.    There were no arrhythmias during stress.    The nuclear portion of this study will be reported separately.     Coronary Angiogram:  Results for orders placed during the hospital encounter of 06/25/24    Cardiac catheterization    Conclusion    The estimated blood loss was none.      Assisting Surgeon: None    Pre-Operative Diagnosis: NSTEMI (non-ST elevation myocardial infarction) [I21.4]    Post-Operative Diagnosis: Post-Op Diagnosis Codes:  * NSTEMI (non-ST elevation myocardial infarction) [I21.4]    s/p  "catheterization secondary to: NSTEMI    Cath Results:  Access: Us guided RRA and RCFA  LM: medium caliber, YADIRA III flow  LAD: Medium caliber, stent patents, YADIRA III flow  LCx: ostium to prox stent is completely occluded, OM stent is completely occluded with microchannels. Right to left collaterals appreciated.   YADIRA III flow  RCA: medium caliber, ,stents patents with mild ISR, distal RCA and PDA with 70-80% stenosis but are small vessels <2.0mm. YADIRA III flow  LVgram: LVEDP 25    Intervention:    PCI procedure:  Heparin administered. ACT was closely monitored.  Using a EBU 3.5 guider, this was used to cannulate the left system. Multiple wires used throughout the case. Advanced juno blue to OM,  50 to Cx, runthrough to LAD. First we balloon the ost-prox cx with 2.0 mm, 2.5 mm, 2.5 mm angiosculpt, 3.0 mm NC. After multiple wires, I was able to advanced  50 to Cx and predilated in the same fashion with the same balloons. After, with two new 2.5 mm NC balloon with did KBI. With acceptable results. After the balloon was removed.  The wire was left in place.  Repeat angiography showed no signs of dissection.  Subsequently the wire was pulled back.  Final angiography showed YADIRA-3 flow.  No signs of dissection, perforation, or thrombus.    Closure device: vasc band and manual pressure  Patient tolerated procedure well, no complications    Post Cath Exam:  /61 (BP Location: Left arm, Patient Position: Lying)   Pulse 65   Temp 98.8 °F (37.1 °C) (Oral)   Resp 14   Ht 5' 10" (1.778 m)   Wt 83.7 kg (184 lb 8.4 oz)   SpO2 (!) 91%   BMI 26.48 kg/m²    Anesthesia: RN IV Sedation      Estimated Blood Loss (EBL): * No values recorded between 6/26/2024 11:24 AM and 6/26/2024  2:38 PM *    Specimens:  Specimen (24h ago, onward)    Assessment:  1. Successful angioplasty of LCX/OM with acceptable results - regaining YADIRA III flow  2. Patent stents in LAD and RCA      Plan:  1. DAPT  2. GDMT and intense statin " therapy +/- repatha  3. PET stress test as an outpatient, if ISR again will need brachytherapy  4. F/u cardiology        The procedure log was documented by Documenter: Mark Anthony Suarez RN and verified by Enoch Bray MD.    Date: 6/26/2024  Time: 6:20 PM          Assessment/Plan:     Active Hospital Problems    Diagnosis    *COPD exacerbation    Acute congestive heart failure    ESRD (end stage renal disease) on dialysis    Paroxysmal atrial fibrillation    Chest pain    HTN (hypertension)       Domingo Gross is a 64 y.o. male with a PMHx of PAD with multiple interventions, complex CAD with multiple PCIs presents with SOB (his anginal equivalent)    CAD s/p PCIs--> Continue BB, imdur, hydralazine, DAPT, statin, heparin gtt for pAFIB -discontinued eliquis (last 09/14 at 10:40 am) planning for coronary angiogram+/-PCI Monday- groin access. NPO Monday MN. Telemetry  PAD on DAPT, eliquis. Stable.   HTN- continue home medications  ESRD- nephology [facility] consulted  HLD continue statin        Enoch Bray MD  Interventional Cardiology  [facility] [place]    48 minutes were spent on this visit including time personally:  -Reviewing Medical records & lab results  -Independently reviewing and interpreting EKGs, echocardiograms, catherizations   -Obtaining a history, performing a relevant exam, counseling/educating the patient/family  -Documenting clinical information in the EMR including ordering of tests  -Care coordination and communicating with other health care providers

## 2024-09-15 LAB
ANION GAP SERPL CALC-SCNC: 13 MMOL/L (ref 8–16)
APTT PPP: 61 SEC (ref 21–32)
APTT PPP: 63.4 SEC (ref 21–32)
APTT PPP: 93.1 SEC (ref 21–32)
BASOPHILS # BLD AUTO: 0 K/UL (ref 0–0.2)
BASOPHILS NFR BLD: 0 % (ref 0–1.9)
BUN SERPL-MCNC: 40 MG/DL (ref 8–23)
CALCIUM SERPL-MCNC: 9.7 MG/DL (ref 8.7–10.5)
CHLORIDE SERPL-SCNC: 97 MMOL/L (ref 95–110)
CO2 SERPL-SCNC: 22 MMOL/L (ref 23–29)
CREAT SERPL-MCNC: 4.9 MG/DL (ref 0.5–1.4)
DIFFERENTIAL METHOD BLD: ABNORMAL
EOSINOPHIL # BLD AUTO: 0 K/UL (ref 0–0.5)
EOSINOPHIL NFR BLD: 0 % (ref 0–8)
ERYTHROCYTE [DISTWIDTH] IN BLOOD BY AUTOMATED COUNT: 14.8 % (ref 11.5–14.5)
EST. GFR  (NO RACE VARIABLE): 13 ML/MIN/1.73 M^2
GLUCOSE SERPL-MCNC: 134 MG/DL (ref 70–110)
HCT VFR BLD AUTO: 29.3 % (ref 40–54)
HGB BLD-MCNC: 9.8 G/DL (ref 14–18)
IMM GRANULOCYTES # BLD AUTO: 0.04 K/UL (ref 0–0.04)
IMM GRANULOCYTES NFR BLD AUTO: 0.4 % (ref 0–0.5)
LYMPHOCYTES # BLD AUTO: 0.3 K/UL (ref 1–4.8)
LYMPHOCYTES NFR BLD: 3.5 % (ref 18–48)
MCH RBC QN AUTO: 28.6 PG (ref 27–31)
MCHC RBC AUTO-ENTMCNC: 33.4 G/DL (ref 32–36)
MCV RBC AUTO: 85 FL (ref 82–98)
MONOCYTES # BLD AUTO: 0.6 K/UL (ref 0.3–1)
MONOCYTES NFR BLD: 6.8 % (ref 4–15)
NEUTROPHILS # BLD AUTO: 8.2 K/UL (ref 1.8–7.7)
NEUTROPHILS NFR BLD: 89.3 % (ref 38–73)
NRBC BLD-RTO: 0 /100 WBC
PLATELET # BLD AUTO: 121 K/UL (ref 150–450)
PMV BLD AUTO: 11.1 FL (ref 9.2–12.9)
POTASSIUM SERPL-SCNC: 3.5 MMOL/L (ref 3.5–5.1)
RBC # BLD AUTO: 3.43 M/UL (ref 4.6–6.2)
SODIUM SERPL-SCNC: 132 MMOL/L (ref 136–145)
WBC # BLD AUTO: 9.21 K/UL (ref 3.9–12.7)

## 2024-09-15 PROCEDURE — 36415 COLL VENOUS BLD VENIPUNCTURE: CPT | Performed by: FAMILY MEDICINE

## 2024-09-15 PROCEDURE — 36415 COLL VENOUS BLD VENIPUNCTURE: CPT | Performed by: STUDENT IN AN ORGANIZED HEALTH CARE EDUCATION/TRAINING PROGRAM

## 2024-09-15 PROCEDURE — 99233 SBSQ HOSP IP/OBS HIGH 50: CPT | Mod: ,,, | Performed by: STUDENT IN AN ORGANIZED HEALTH CARE EDUCATION/TRAINING PROGRAM

## 2024-09-15 PROCEDURE — 85025 COMPLETE CBC W/AUTO DIFF WBC: CPT | Performed by: FAMILY MEDICINE

## 2024-09-15 PROCEDURE — 63600175 PHARM REV CODE 636 W HCPCS: Performed by: STUDENT IN AN ORGANIZED HEALTH CARE EDUCATION/TRAINING PROGRAM

## 2024-09-15 PROCEDURE — 85730 THROMBOPLASTIN TIME PARTIAL: CPT | Mod: 91 | Performed by: STUDENT IN AN ORGANIZED HEALTH CARE EDUCATION/TRAINING PROGRAM

## 2024-09-15 PROCEDURE — 11000001 HC ACUTE MED/SURG PRIVATE ROOM

## 2024-09-15 PROCEDURE — 80048 BASIC METABOLIC PNL TOTAL CA: CPT | Performed by: FAMILY MEDICINE

## 2024-09-15 PROCEDURE — 25000003 PHARM REV CODE 250: Performed by: STUDENT IN AN ORGANIZED HEALTH CARE EDUCATION/TRAINING PROGRAM

## 2024-09-15 RX ORDER — SODIUM CHLORIDE 9 MG/ML
INJECTION, SOLUTION INTRAVENOUS ONCE
OUTPATIENT
Start: 2024-09-15 | End: 2024-09-15

## 2024-09-15 RX ORDER — MUPIROCIN 20 MG/G
OINTMENT TOPICAL 2 TIMES DAILY
OUTPATIENT
Start: 2024-09-15 | End: 2024-09-20

## 2024-09-15 RX ADMIN — HYDRALAZINE HYDROCHLORIDE 50 MG: 25 TABLET ORAL at 06:09

## 2024-09-15 RX ADMIN — PREDNISONE 60 MG: 20 TABLET ORAL at 09:09

## 2024-09-15 RX ADMIN — LEVOTHYROXINE SODIUM 75 MCG: 25 TABLET ORAL at 06:09

## 2024-09-15 RX ADMIN — CARVEDILOL 25 MG: 25 TABLET, FILM COATED ORAL at 06:09

## 2024-09-15 RX ADMIN — ATORVASTATIN CALCIUM 40 MG: 40 TABLET, FILM COATED ORAL at 10:09

## 2024-09-15 RX ADMIN — CLOPIDOGREL 75 MG: 75 TABLET, FILM COATED ORAL at 09:09

## 2024-09-15 RX ADMIN — CARVEDILOL 25 MG: 25 TABLET, FILM COATED ORAL at 04:09

## 2024-09-15 RX ADMIN — ISOSORBIDE MONONITRATE 30 MG: 30 TABLET, EXTENDED RELEASE ORAL at 09:09

## 2024-09-15 RX ADMIN — HYDRALAZINE HYDROCHLORIDE 50 MG: 25 TABLET ORAL at 10:09

## 2024-09-15 RX ADMIN — HEPARIN SODIUM 12 UNITS/KG/HR: 10000 INJECTION, SOLUTION INTRAVENOUS at 04:09

## 2024-09-15 NOTE — PLAN OF CARE
Problem: Adult Inpatient Plan of Care  Goal: Plan of Care Review  9/15/2024 1154 by Guera Sanchez RN  Outcome: Progressing  9/15/2024 1153 by Geura Sanchez RN  Outcome: Progressing  Goal: Patient-Specific Goal (Individualized)  9/15/2024 1154 by Guera Sanchez RN  Outcome: Progressing  9/15/2024 1153 by Guera Sanchez RN  Outcome: Progressing  Goal: Absence of Hospital-Acquired Illness or Injury  Outcome: Progressing  Goal: Optimal Comfort and Wellbeing  Outcome: Progressing  Goal: Readiness for Transition of Care  Outcome: Progressing     Problem: Heart Failure  Goal: Optimal Coping  9/15/2024 1154 by Guera Sanchez RN  Outcome: Progressing  9/15/2024 1153 by Guera Sanchez RN  Outcome: Progressing  Goal: Optimal Cardiac Output  Outcome: Progressing  Goal: Stable Heart Rate and Rhythm  9/15/2024 1154 by Guera Sanchez RN  Outcome: Progressing  9/15/2024 1153 by Guera Sanchez RN  Outcome: Progressing  Goal: Optimal Functional Ability  Outcome: Progressing  Goal: Fluid and Electrolyte Balance  Outcome: Progressing  Goal: Improved Oral Intake  Outcome: Progressing  Goal: Effective Oxygenation and Ventilation  9/15/2024 1154 by Guera Sanchez RN  Outcome: Progressing  9/15/2024 1153 by Guera Sanchez RN  Outcome: Progressing  Goal: Effective Breathing Pattern During Sleep  Outcome: Progressing     Problem: COPD (Chronic Obstructive Pulmonary Disease)  Goal: Optimal Chronic Illness Coping  Outcome: Progressing  Goal: Optimal Level of Functional [place]  Outcome: Progressing  Goal: Absence of Infection Signs and Symptoms  Outcome: Progressing  Goal: Improved Oral Intake  Outcome: Progressing  Goal: Effective Oxygenation and Ventilation  Outcome: Progressing     Problem: Hemodialysis  Goal: Safe, Effective Therapy Delivery  9/15/2024 1154 by Guera Sanchez RN  Outcome: Progressing  9/15/2024 1153 by Guera Sanchez RN  Outcome: Progressing  Goal: Effective Tissue  Perfusion  9/15/2024 1154 by Guera Sanchez RN  Outcome: Progressing  9/15/2024 1153 by Guera Sanchez RN  Outcome: Progressing  Goal: Absence of Infection Signs and Symptoms  9/15/2024 1154 by Guera Sanchez RN  Outcome: Progressing  9/15/2024 1153 by Guera Sanchez RN  Outcome: Progressing     Problem: Comorbidity Management  Goal: Blood Pressure in Desired Range  9/15/2024 1154 by Guera Sanchez RN  Outcome: Progressing  9/15/2024 1153 by Guera Sanchez RN  Outcome: Progressing  Goal: Maintenance of Heart Failure Symptom Control  Outcome: Progressing     Problem: Fall Injury Risk  Goal: Absence of Fall and Fall-Related Injury  9/15/2024 1154 by Guera Sanchez RN  Outcome: Progressing  9/15/2024 1153 by Guera Sanchez RN  Outcome: Progressing

## 2024-09-15 NOTE — ASSESSMENT & PLAN NOTE
Chronic, controlled. Latest blood pressure and vitals reviewed-     Temp:  [97.6 °F (36.4 °C)-98.3 °F (36.8 °C)]   Pulse:  [61-77]   Resp:  [16-19]   BP: (101-133)/(44-72)   SpO2:  [93 %-97 %] .   Home meds for hypertension were reviewed and noted below.   Hypertension Medications               amLODIPine (NORVASC) 5 MG tablet Take 1 tablet (5 mg total) by mouth once daily.    carvediloL (COREG) 25 MG tablet Take 1 tablet (25 mg total) by mouth 2 (two) times daily with meals.    eplerenone (INSPRA) 50 MG Tab Take 1 tablet (50 mg total) by mouth once daily.    hydrALAZINE (APRESOLINE) 50 MG tablet Take 1 tablet (50 mg total) by mouth every 8 (eight) hours.    isosorbide mononitrate (IMDUR) 30 MG 24 hr tablet Take 1 tablet (30 mg total) by mouth once daily.    nitroGLYCERIN (NITROSTAT) 0.4 MG SL tablet Place 1 tablet (0.4 mg total) under the tongue every 5 (five) minutes as needed for Chest pain (for a max of 3 tabs in 15 minutes).            While in the hospital, will manage blood pressure as follows; Continue home antihypertensive regimen    Will utilize p.r.n. blood pressure medication only if patient's blood pressure greater than 180/110 and he develops symptoms such as worsening chest pain or shortness of breath.

## 2024-09-15 NOTE — PROGRESS NOTES
"[facility] Progress Note        Subjective:      Pt seen and examined at bedside this am, ÁNGELA BABIN. Tele reviewed.      Objective:     Last 24 Hour Vital Signs:  BP  Min: 101/52  Max: 133/60  Temp  Av °F (36.7 °C)  Min: 97.6 °F (36.4 °C)  Max: 98.3 °F (36.8 °C)  Pulse  Av.5  Min: 61  Max: 77  Resp  Av.9  Min: 16  Max: 19  SpO2  Av.5 %  Min: 93 %  Max: 97 %  Height  Av' 10" (177.8 cm)  Min: 5' 10" (177.8 cm)  Max: 5' 10" (177.8 cm)  Weight  Av.9 kg (158 lb 8.2 oz)  Min: 70 kg (154 lb 5.2 oz)  Max: 75.7 kg (166 lb 14.2 oz)  I/O last 3 completed shifts:  In: 100 [IV Piggyback:100]  Out: 2800 [Urine:300; Other:2500]    Physical Examination:  GEN: Comfortable, No distress  HEENT: No JVD. No carotid bruit  CVS: RRR. No G/R/M.   ABD: Soft, NT. BS+  Ext: Moves freely.  Neuro: no focal deficits  Psych: Appropriate.    Laboratory:  Laboratory Data Reviewed: yes  Pertinent Findings:      Other Results:  EKG   ECHO:        Current Medications:     Infusions:   heparin (porcine) in D5W  12 Units/kg/hr (Adjusted) Intravenous Continuous 9 mL/hr at 09/15/24 0904 12 Units/kg/hr at 09/15/24 09        Scheduled:   atorvastatin  40 mg Oral QHS    carvediloL  25 mg Oral BID WM    clopidogreL  75 mg Oral Daily    hydrALAZINE  50 mg Oral Q8H    isosorbide mononitrate  30 mg Oral Daily    levothyroxine  75 mcg Oral Before breakfast    predniSONE  60 mg Oral Daily        PRN:    Current Facility-Administered Medications:     acetaminophen, 650 mg, Oral, Q6H PRN    albuterol, 2 puff, Inhalation, Q6H PRN    albuterol-ipratropium, 3 mL, Nebulization, Q6H PRN    dextrose 10%, 12.5 g, Intravenous, PRN    dextrose 10%, 25 g, Intravenous, PRN    glucagon (human recombinant), 1 mg, Intramuscular, PRN    glucose, 16 g, Oral, PRN    glucose, 24 g, Oral, PRN    heparin (PORCINE), 60 Units/kg (Adjusted), Intravenous, PRN    heparin (PORCINE), 30 Units/kg (Adjusted), Intravenous, PRN    hydrOXYzine pamoate, 25 mg, " Oral, Q6H PRN    influenza, 0.5 mL, Intramuscular, vaccine x 1 dose    naloxone, 0.02 mg, Intravenous, PRN    ondansetron, 4 mg, Intravenous, Q8H PRN    sodium chloride 0.9%, 10 mL, Intravenous, Q12H PRN      Assessment:     Domingo Gross is a 64 y.o.male with  Patient Active Problem List    Diagnosis Date Noted    Shortness of breath 09/14/2024    S/P drug eluting coronary stent placement 09/14/2024    Atherosclerosis of native coronary artery of native heart with unstable angina pectoris 09/14/2024    Acute congestive heart failure 09/13/2024    Pulmonary edema 09/09/2024    Anticoagulated 07/01/2024    NSTEMI (non-ST elevated myocardial infarction) 06/25/2024    Hemodialysis catheter dysfunction 05/16/2024    S/P arteriovenous (AV) graft placement 04/23/2024    Membranous nephropathy determined by biopsy 02/20/2024    Closed fracture of transverse process of lumbar vertebra 02/16/2024    ESRD (end stage renal disease) on dialysis 02/16/2024    Fall 02/16/2024    EBV infection 01/31/2024    CMV (cytomegalovirus infection) 01/31/2024    Paroxysmal atrial fibrillation 08/24/2023    Hypothyroidism 03/02/2023    Unstable angina 03/02/2023    Chest pain 11/12/2022    Persistent atrial fibrillation 11/12/2022    Anemia due to chronic kidney disease, on chronic dialysis 12/15/2021    COPD exacerbation 08/20/2021    Nuclear sclerosis, bilateral 06/08/2020    Nephrotic range proteinuria 04/06/2020    Cardiac arrest 10/04/2019    Bilateral carotid artery stenosis     Coronary artery disease 03/29/2019    Abnormal cardiovascular stress test 02/27/2019    Venous insufficiency of both lower extremities 06/04/2018    Atherosclerosis of native artery of both lower extremities with intermittent claudication 03/02/2018    Hyperlipidemia 06/12/2015    DJD (degenerative joint disease), lumbar 05/27/2015    Claudication in peripheral vascular disease 06/13/2014    PAD (peripheral artery disease) 05/21/2014    HTN (hypertension)  04/29/2013        Plan:     Domingo Gross is a 64 y.o. male with a PMHx of PAD with multiple interventions, complex CAD with multiple PCIs presents with SOB (his anginal equivalent).      CAD s/p PCIs--> Continue BB, imdur, hydralazine, DAPT, statin, heparin gtt for pAFIB -discontinued eliquis (last 09/14 at 10:40 am) planning for coronary angiogram+/-PCI Monday- groin access. NPO Monday MN. Telemetry  PAD on DAPT, eliquis. Stable.   HTN- continue home medications  ESRD- followed by Dr. Yarbrough  HLD continue statin         Enoch Bray MD  [facility]

## 2024-09-15 NOTE — ASSESSMENT & PLAN NOTE
Patient with Long standing persistent (>12 months) atrial fibrillation which is controlled currently with Beta Blocker. Patient is currently in sinus rhythm.[id number] Score: 1. Anticoagulation indicated. Anticoagulation done with heparin drip , Eliquis is on hold because patient is planned for cardiac catheterization on Monday .

## 2024-09-15 NOTE — PROGRESS NOTES
[facility] Medicine  Progress Note    Patient Name: Domingo Gross  MRN: [id number]  Patient Class: IP- Inpatient   Admission Date: 9/13/2024  Length of Stay: 2 days  Attending Physician: Nima Ervin MD  Primary Care Provider: Perez Bell DO        Subjective:     Principal Problem:Atherosclerosis of native coronary artery of native heart with unstable angina pectoris        HPI:  This is a 64-year-old male with a history of end-stage renal disease on renal dialysis, COPD, hypertension, diabetes mellitus type, and CHF who presents to emergency department with 5 days' history of having worsening shortness of breath and chest discomfort.  Patient was seen in the ED and he was given DuoNebs and Lasix and he had improvement.  However he still was desatting to about 84% and complaining of chest discomfort.  Patient was put on supplemental oxygen and due to patient's presenting symptoms he will require admission for further management.    At time of my examination patient denied any headache, fever, chills, hemoptysis, a complaint of shortness of breath and chest pain.    Overview/Hospital Course:  No notes on file    Interval History:   I saw and examined patient at bedside today  Patient feels much better   He is aware of the plan for cardiac cath tomorrow     Review of Systems   Constitutional: Negative.    HENT: Negative.     Genitourinary: Negative.    Musculoskeletal: Negative.    Neurological: Negative.    Psychiatric/Behavioral: Negative.       Objective:     Vital Signs (Most Recent):  Temp: 97.9 °F (36.6 °C) (09/15/24 1112)  Pulse: 68 (09/15/24 1358)  Resp: 18 (09/15/24 1112)  BP: (!) 108/54 (09/15/24 1358)  SpO2: 97 % (09/15/24 1112) Vital Signs (24h Range):  Temp:  [97.6 °F (36.4 °C)-98.3 °F (36.8 °C)] 97.9 °F (36.6 °C)  Pulse:  [61-77] 68  Resp:  [16-19] 18  SpO2:  [93 %-97 %] 97 %  BP: (101-133)/(44-72) 108/54     Weight: 70 kg (154 lb 5.2 oz)  Body mass index is 22.14  kg/m².    Intake/Output Summary (Last 24 hours) at 9/15/2024 1413  Last data filed at 9/15/2024 0732  Gross per 24 hour   Intake 154.21 ml   Output 2500 ml   Net -2345.79 ml         Physical Exam  Constitutional:       Appearance: Normal appearance.   HENT:      Head: Normocephalic and atraumatic.   Cardiovascular:      Rate and Rhythm: Normal rate.   Pulmonary:      Effort: Pulmonary effort is normal.   Musculoskeletal:         General: Normal range of motion.   Skin:     General: Skin is warm.   Neurological:      General: No focal deficit present.      Mental Status: He is alert. Mental status is at baseline.   Psychiatric:         Mood and Affect: Mood normal.         Behavior: Behavior normal.             Significant Labs: All pertinent labs within the past 24 hours have been reviewed.  BMP:   Recent Labs   Lab 09/13/24  1511 09/14/24  0433 09/15/24  0426   GLU 93   < > 134*   *   < > 132*   K 3.3*   < > 3.5   CL 94*   < > 97   CO2 28   < > 22*   BUN 31*   < > 40*   CREATININE 4.7*   < > 4.9*   CALCIUM 9.2   < > 9.7   MG 1.8  --   --     < > = values in this interval not displayed.     CBC:   Recent Labs   Lab 09/13/24  1511 09/14/24 0433 09/15/24  0426   WBC 7.88 7.06 9.21   HGB 9.7* 9.3* 9.8*   HCT 29.5* 27.9* 29.3*   * 123* 121*       Significant Imaging: I have reviewed all pertinent imaging results/findings within the past 24 hours.    Assessment/Plan:      Chest pain  Trend troponins.  Aspirin,sublingual nitroglycerin p.r.n. chest pain.  Inpatient Cardiology consultation, plan for cardiac cath on Monday      ESRD (end stage renal disease) on dialysis  Creatine stable for now. BMP reviewed- noted Estimated Creatinine Clearance: 15.3 mL/min (A) (based on SCr of 4.9 mg/dL (H)). according to latest data. Based on current GFR, CKD stage is end stage.  Monitor UOP and serial BMP and adjust therapy as needed. Renally dose meds. Avoid nephrotoxic medications and procedures.    Nephro consult for HD  inpatient    Paroxysmal atrial fibrillation  Patient with Long standing persistent (>12 months) atrial fibrillation which is controlled currently with Beta Blocker. Patient is currently in sinus rhythm.[id number] Score: 1. Anticoagulation indicated. Anticoagulation done with heparin drip , Eliquis is on hold because patient is planned for cardiac catheterization on Monday .    COPD exacerbation  Patient's COPD is with exacerbation noted by continued dyspnea currently.  Patient is currently on COPD Pathway. Continue scheduled inhalers Steroids and Supplemental oxygen and monitor respiratory status closely.     Improved   Prednisone 60 mg po for 5 days total  Patient started feeling better , no wheezing on exam        Coronary artery disease  Patient with known CAD s/p stent placement and CABG, which is controlled Will continue Plavix and Statin and monitor for S/Sx of angina/ACS. Continue to monitor on telemetry.   Patient is complaining of chest pain on admission  Seen for cardiology: plan for cardiac cath on Monday  Patient is aware of the plan and agree    HTN (hypertension)  Chronic, controlled. Latest blood pressure and vitals reviewed-     Temp:  [97.6 °F (36.4 °C)-98.3 °F (36.8 °C)]   Pulse:  [61-77]   Resp:  [16-19]   BP: (101-133)/(44-72)   SpO2:  [93 %-97 %] .   Home meds for hypertension were reviewed and noted below.   Hypertension Medications               amLODIPine (NORVASC) 5 MG tablet Take 1 tablet (5 mg total) by mouth once daily.    carvediloL (COREG) 25 MG tablet Take 1 tablet (25 mg total) by mouth 2 (two) times daily with meals.    eplerenone (INSPRA) 50 MG Tab Take 1 tablet (50 mg total) by mouth once daily.    hydrALAZINE (APRESOLINE) 50 MG tablet Take 1 tablet (50 mg total) by mouth every 8 (eight) hours.    isosorbide mononitrate (IMDUR) 30 MG 24 hr tablet Take 1 tablet (30 mg total) by mouth once daily.    nitroGLYCERIN (NITROSTAT) 0.4 MG SL tablet Place 1 tablet (0.4 mg total) under the  tongue every 5 (five) minutes as needed for Chest pain (for a max of 3 tabs in 15 minutes).            While in the hospital, will manage blood pressure as follows; Continue home antihypertensive regimen    Will utilize p.r.n. blood pressure medication only if patient's blood pressure greater than 180/110 and he develops symptoms such as worsening chest pain or shortness of breath.      VTE Risk Mitigation (From admission, onward)           Ordered     heparin 25,000 units in dextrose 5% 250 mL (100 units/mL) infusion LOW INTENSITY nomogram - [facility]  Continuous        Question:  Begin at (units/kg/hr)  Answer:  12    09/14/24 1115     heparin 25,000 units in dextrose 5% (100 units/ml) IV bolus from bag LOW INTENSITY nomogram - [facility]  As needed (PRN)        Question:  Heparin Infusion Adjustment (DO NOT MODIFY ANSWER)  Answer:  \\[email or web address]\epic\Images\Pharmacy\HeparinInfusions\heparin LOW INTENSITY nomogram for [facility] [id number]    09/14/24 1115     heparin 25,000 units in dextrose 5% (100 units/ml) IV bolus from bag LOW INTENSITY nomogram - [facility]  As needed (PRN)        Question:  Heparin Infusion Adjustment (DO NOT MODIFY ANSWER)  Answer:  \\[email or web address]\epic\Images\Pharmacy\HeparinInfusions\heparin LOW INTENSITY nomogram for [facility] [id number].pdf    09/14/24 1115     IP VTE HIGH RISK PATIENT  Once         09/13/24 2248     Place sequential compression device  Until discontinued         09/13/24 2248                    Discharge Planning   FLACO:      Code Status: Full Code   Is the patient medically ready for discharge?:     Reason for patient still in hospital (select all that apply): Treatment and Consult recommendations                     Nima Ervin MD  Department of Hospital Medicine   [facility]

## 2024-09-15 NOTE — CONSULTS
Nephrology Consult   Note     Consult Requested By: Nima Ervin MD  Reason for Consult: ESRD     SUBJECTIVE:      History of Present Illness:  Domingo Gross is a 64 y.o.   male who  has a past medical history of Acute congestive heart failure (9/13/2024), Allergy, Anemia, Anticoagulant long-term use, Arthritis, CKD (chronic kidney disease) stage 4, GFR 15-29 ml/min, COPD (chronic obstructive pulmonary disease) (08/20/2021), Coronary artery disease, Heart attack (10/04/2019), Hematuria, Hemothorax, Hyperlipidemia, Hypertension, Hyperuricemia, Hypocalcemia, Hypokalemia, Hypophosphatemia, Hypothyroidism (3/2/2023), PAD (peripheral artery disease), Proteinuria, and Vitamin D deficiency.. The patient presented to the [facility] on 9/13/2024 with a primary complaint of SOB.    ?    Review of Systems   Constitutional:  Negative for chills and fever.   HENT:  Negative for congestion and sore throat.    Eyes:  Negative for blurred vision, double vision and photophobia.   Respiratory:  Positive for shortness of breath. Negative for cough.    Cardiovascular:  Positive for orthopnea. Negative for chest pain, palpitations and leg swelling.   Gastrointestinal:  Negative for abdominal pain, diarrhea, nausea and vomiting.   Genitourinary:  Negative for dysuria and urgency.   Musculoskeletal:  Negative for joint pain and myalgias.   Skin:  Negative for itching and rash.   Neurological:  Negative for dizziness, sensory change, weakness and headaches.   Endo/Heme/Allergies:  Negative for polydipsia. Does not bruise/bleed easily.   Psychiatric/Behavioral:  Negative for depression.        Past Medical History:   Diagnosis Date    Acute congestive heart failure 9/13/2024    Allergy     Anemia     Anticoagulant long-term use     Arthritis     CKD (chronic kidney disease) stage 4, GFR 15-29 ml/min     COPD (chronic obstructive pulmonary disease) 08/20/2021    Coronary artery disease     Heart attack 10/04/2019     Hematuria     Hemothorax     Hyperlipidemia     Hypertension     Hyperuricemia     Hypocalcemia     Hypokalemia     Hypophosphatemia     Hypothyroidism 3/2/2023    PAD (peripheral artery disease)     Proteinuria     Vitamin D deficiency           OBJECTIVE:     Vital Signs (Most Recent)  Vitals:    09/15/24 0730 09/15/24 0742 09/15/24 1112 09/15/24 1203   BP:  (!) 105/56 (!) 113/54    BP Location:  Right arm Right arm    Patient Position:  Sitting Lying    Pulse: 65 61 67 73   Resp:  18 18    Temp:  97.8 °F (36.6 °C) 97.9 °F (36.6 °C)    TempSrc:  Oral Oral    SpO2:  96% 97%    Weight:       Height:             Date 09/15/24 0700 - 09/16/24 0659   Shift 8308-1928 8259-5038 8593-6753 24 Hour Total   INTAKE   I.V.(mL/kg) 154.2(2.2)   154.2(2.2)   Shift Total(mL/kg) 154.2(2.2)   154.2(2.2)   OUTPUT   Shift Total(mL/kg)       Weight (kg) 70 70 70 70           Medications:   atorvastatin  40 mg Oral QHS    carvediloL  25 mg Oral BID WM    clopidogreL  75 mg Oral Daily    hydrALAZINE  50 mg Oral Q8H    isosorbide mononitrate  30 mg Oral Daily    levothyroxine  75 mcg Oral Before breakfast    predniSONE  60 mg Oral Daily             Physical Exam  Vitals and nursing note reviewed.   Constitutional:       General: He is not in acute distress.     Appearance: He is not diaphoretic.   HENT:      Head: Normocephalic and atraumatic.      Mouth/Throat:      Pharynx: No oropharyngeal exudate.   Eyes:      General: No scleral icterus.     Conjunctiva/sclera: Conjunctivae normal.      Pupils: Pupils are equal, round, and reactive to light.   Cardiovascular:      Rate and Rhythm: Normal rate and regular rhythm.      Heart sounds: Normal heart sounds. No murmur heard.  Pulmonary:      Effort: Pulmonary effort is normal. No respiratory distress.      Breath sounds: No rales.   Abdominal:      General: Bowel sounds are normal. There is no distension.      Palpations: Abdomen is soft.      Tenderness: There is no abdominal tenderness.    Musculoskeletal:         General: Normal range of motion.      Cervical back: Normal range of motion and neck supple.      Right lower leg: No edema.      Left lower leg: No edema.      Comments: PEE ANN    Skin:     General: Skin is warm and dry.      Findings: No erythema.   Neurological:      Mental Status: He is alert and oriented to person, place, and time.      Cranial Nerves: No cranial nerve deficit.      Comments:     Psychiatric:         Mood and Affect: Affect normal.         Cognition and Memory: Memory normal.         Judgment: Judgment normal.         Laboratory:  Recent Labs   Lab 09/13/24  1511 09/14/24  0433 09/15/24  0426   WBC 7.88 7.06 9.21   HGB 9.7* 9.3* 9.8*   HCT 29.5* 27.9* 29.3*   * 123* 121*   MONO 13.6  1.1* 12.5  0.9 6.8  0.6   EOSINOPHIL 0.1 0.3 0.0     Recent Labs   Lab 09/09/24  0752 09/10/24  0336 09/13/24 1511 09/14/24 0433 09/15/24  0426   * 139 135* 133* 132*   K 3.1* 3.3* 3.3* 3.1* 3.5    103 94* 94* 97   CO2 25 25 28 25 22*   BUN 21 18 31* 36* 40*   CREATININE 3.9* 3.4* 4.7* 4.9* 4.9*   CALCIUM 8.6* 8.6* 9.2 9.0 9.7   PHOS 3.8 3.1 3.8  --   --        Diagnostic Results:  X-Ray: Reviewed  US: Reviewed  Echo: Reviewed    Left Ventricle: The left ventricle is mildly dilated. There is eccentric hypertrophy. Regional wall motion abnormalities present. See diagram for wall motion findings. There is mildly reduced systolic function with a visually estimated ejection fraction of 45 - 50%. There is indeterminate diastolic function.Elevated left ventricular filling pressure.    Right Ventricle: Normal right ventricular cavity size. Wall thickness is normal. Systolic function is normal.    Left Atrium: Left atrium is mildly dilated.    Right Atrium: Right atrium is mildly dilated.    Mitral Valve: There is mild to moderate regurgitation.    Pulmonic Valve: There is moderate regurgitation.    Pulmonary Artery: There is pulmonary hypertension. The estimated  pulmonary artery systolic pressure is 47 mmHg.    IVC/SVC: Intermediate venous pressure at 8 mmHg.  ASSESSMENT/PLAN:     ESRD on HD TTS with Dr Loly Payne  in [facility]   Yesterday only did 2 hours of dialysis.  Plan for catheterization with cardiology for tomorrow noted, we will plan to do additional 2 hours of dialysis tomorrow postprocedure and then resume regular Tuesday Thursday Saturday schedule          Hypokalemia   -- replace PO   -- 4 K bath     2. HTN   controlled       3. Anemia of ESRD on EPO and IV Iron outpatient   Recent Labs   Lab 09/13/24  1511 09/14/24  0433 09/15/24  0426   HGB 9.7* 9.3* 9.8*   HCT 29.5* 27.9* 29.3*   * 123* 121*       Iron   Lab Results   Component Value Date    IRON 42 (L) 06/26/2024    TIBC 303 06/26/2024    FERRITIN 674 (H) 03/14/2024       4. MBD - PTH at goal   Recent Labs   Lab 09/13/24  1511 09/14/24  0433 09/15/24  0426   CALCIUM 9.2   < > 9.7   PHOS 3.8  --   --     < > = values in this interval not displayed.     Recent Labs   Lab 09/09/24  0752 09/13/24  1511   MG 1.7 1.8       Lab Results   Component Value Date    .6 (H) 06/26/2024    CALCIUM 9.7 09/15/2024    CAION 1.31 07/14/2020    PHOS 3.8 09/13/2024     Lab Results   Component Value Date    MNBYUPNT09IA 9 (L) 03/14/2024     Acidosis - correct with HD   Lab Results   Component Value Date    CO2 22 (L) 09/15/2024        Nutrition/Hypoalbuminemia    Recent Labs   Lab 09/10/24  0336 09/13/24  1511 09/14/24  0002 09/14/24  1149   LABPROT  --   --  14.5* 14.1*   ALBUMIN 2.7* 3.3*  --   --      [place]ro with meals TID.       Thank you for the consult, will follow  With any question please call [phone]  Alexia Yarbrough MD    [facility]    ESPERANZA Payne MD,   O. NimkeMD BRANDY coronado MD E. V. Harmon, NP    [address]  [place], [address] [phone]

## 2024-09-15 NOTE — SUBJECTIVE & OBJECTIVE
Interval History:   I saw and examined patient at bedside today  Patient feels much better   He is aware of the plan for cardiac cath tomorrow     Review of Systems   Constitutional: Negative.    HENT: Negative.     Genitourinary: Negative.    Musculoskeletal: Negative.    Neurological: Negative.    Psychiatric/Behavioral: Negative.       Objective:     Vital Signs (Most Recent):  Temp: 97.9 °F (36.6 °C) (09/15/24 1112)  Pulse: 68 (09/15/24 1358)  Resp: 18 (09/15/24 1112)  BP: (!) 108/54 (09/15/24 1358)  SpO2: 97 % (09/15/24 1112) Vital Signs (24h Range):  Temp:  [97.6 °F (36.4 °C)-98.3 °F (36.8 °C)] 97.9 °F (36.6 °C)  Pulse:  [61-77] 68  Resp:  [16-19] 18  SpO2:  [93 %-97 %] 97 %  BP: (101-133)/(44-72) 108/54     Weight: 70 kg (154 lb 5.2 oz)  Body mass index is 22.14 kg/m².    Intake/Output Summary (Last 24 hours) at 9/15/2024 1413  Last data filed at 9/15/2024 0732  Gross per 24 hour   Intake 154.21 ml   Output 2500 ml   Net -2345.79 ml         Physical Exam  Constitutional:       Appearance: Normal appearance.   HENT:      Head: Normocephalic and atraumatic.   Cardiovascular:      Rate and Rhythm: Normal rate.   Pulmonary:      Effort: Pulmonary effort is normal.   Musculoskeletal:         General: Normal range of motion.   Skin:     General: Skin is warm.   Neurological:      General: No focal deficit present.      Mental Status: He is alert. Mental status is at baseline.   Psychiatric:         Mood and Affect: Mood normal.         Behavior: Behavior normal.             Significant Labs: All pertinent labs within the past 24 hours have been reviewed.  BMP:   Recent Labs   Lab 09/13/24  1511 09/14/24  0433 09/15/24  0426   GLU 93   < > 134*   *   < > 132*   K 3.3*   < > 3.5   CL 94*   < > 97   CO2 28   < > 22*   BUN 31*   < > 40*   CREATININE 4.7*   < > 4.9*   CALCIUM 9.2   < > 9.7   MG 1.8  --   --     < > = values in this interval not displayed.     CBC:   Recent Labs   Lab 09/13/24  1511 09/14/24  7269  09/15/24  0426   WBC 7.88 7.06 9.21   HGB 9.7* 9.3* 9.8*   HCT 29.5* 27.9* 29.3*   * 123* 121*       Significant Imaging: I have reviewed all pertinent imaging results/findings within the past 24 hours.

## 2024-09-15 NOTE — PLAN OF CARE
Virtual Nurse note: Patient chart, labs, and vitals reviewed. Care plan and goals updated as needed.     Problem: Adult Inpatient Plan of Care  Goal: Plan of Care Review  Outcome: Progressing

## 2024-09-15 NOTE — ASSESSMENT & PLAN NOTE
Creatine stable for now. BMP reviewed- noted Estimated Creatinine Clearance: 15.3 mL/min (A) (based on SCr of 4.9 mg/dL (H)). according to latest data. Based on current GFR, CKD stage is end stage.  Monitor UOP and serial BMP and adjust therapy as needed. Renally dose meds. Avoid nephrotoxic medications and procedures.    Nephro consult for HD inpatient

## 2024-09-16 LAB
ANION GAP SERPL CALC-SCNC: 17 MMOL/L (ref 8–16)
APTT PPP: 52.5 SEC (ref 21–32)
BASOPHILS # BLD AUTO: 0 K/UL (ref 0–0.2)
BASOPHILS NFR BLD: 0 % (ref 0–1.9)
BUN SERPL-MCNC: 76 MG/DL (ref 8–23)
CALCIUM SERPL-MCNC: 9.1 MG/DL (ref 8.7–10.5)
CHLORIDE SERPL-SCNC: 93 MMOL/L (ref 95–110)
CO2 SERPL-SCNC: 21 MMOL/L (ref 23–29)
CREAT SERPL-MCNC: 6.9 MG/DL (ref 0.5–1.4)
DIFFERENTIAL METHOD BLD: ABNORMAL
EOSINOPHIL # BLD AUTO: 0 K/UL (ref 0–0.5)
EOSINOPHIL NFR BLD: 0 % (ref 0–8)
ERYTHROCYTE [DISTWIDTH] IN BLOOD BY AUTOMATED COUNT: 14.9 % (ref 11.5–14.5)
EST. GFR  (NO RACE VARIABLE): 8 ML/MIN/1.73 M^2
GLUCOSE SERPL-MCNC: 125 MG/DL (ref 70–110)
HCT VFR BLD AUTO: 28 % (ref 40–54)
HGB BLD-MCNC: 9.5 G/DL (ref 14–18)
IMM GRANULOCYTES # BLD AUTO: 0.04 K/UL (ref 0–0.04)
IMM GRANULOCYTES NFR BLD AUTO: 0.5 % (ref 0–0.5)
LYMPHOCYTES # BLD AUTO: 0.3 K/UL (ref 1–4.8)
LYMPHOCYTES NFR BLD: 3.3 % (ref 18–48)
MCH RBC QN AUTO: 28.9 PG (ref 27–31)
MCHC RBC AUTO-ENTMCNC: 33.9 G/DL (ref 32–36)
MCV RBC AUTO: 85 FL (ref 82–98)
MONOCYTES # BLD AUTO: 0.4 K/UL (ref 0.3–1)
MONOCYTES NFR BLD: 4.4 % (ref 4–15)
NEUTROPHILS # BLD AUTO: 7.8 K/UL (ref 1.8–7.7)
NEUTROPHILS NFR BLD: 91.8 % (ref 38–73)
NRBC BLD-RTO: 0 /100 WBC
PLATELET # BLD AUTO: 163 K/UL (ref 150–450)
PMV BLD AUTO: 11.1 FL (ref 9.2–12.9)
POC ACTIVATED CLOTTING TIME K: 195 SEC (ref 74–137)
POC ACTIVATED CLOTTING TIME K: 207 SEC (ref 74–137)
POC ACTIVATED CLOTTING TIME K: 220 SEC (ref 74–137)
POC ACTIVATED CLOTTING TIME K: 238 SEC (ref 74–137)
POC ACTIVATED CLOTTING TIME K: 275 SEC (ref 74–137)
POC ACTIVATED CLOTTING TIME K: 299 SEC (ref 74–137)
POC ACTIVATED CLOTTING TIME K: 305 SEC (ref 74–137)
POCT GLUCOSE: 114 MG/DL (ref 70–110)
POCT GLUCOSE: 132 MG/DL (ref 70–110)
POTASSIUM SERPL-SCNC: 3.7 MMOL/L (ref 3.5–5.1)
RBC # BLD AUTO: 3.29 M/UL (ref 4.6–6.2)
SAMPLE: ABNORMAL
SODIUM SERPL-SCNC: 131 MMOL/L (ref 136–145)
WBC # BLD AUTO: 8.5 K/UL (ref 3.9–12.7)

## 2024-09-16 PROCEDURE — 92920 PRQ TRLUML C ANGIOP 1ART&/BR: CPT | Mod: LC | Performed by: STUDENT IN AN ORGANIZED HEALTH CARE EDUCATION/TRAINING PROGRAM

## 2024-09-16 PROCEDURE — 25500020 PHARM REV CODE 255: Performed by: STUDENT IN AN ORGANIZED HEALTH CARE EDUCATION/TRAINING PROGRAM

## 2024-09-16 PROCEDURE — 11000001 HC ACUTE MED/SURG PRIVATE ROOM

## 2024-09-16 PROCEDURE — 94761 N-INVAS EAR/PLS OXIMETRY MLT: CPT

## 2024-09-16 PROCEDURE — 27201247 HC HEMODIALYSIS, SET-UP & CANCEL

## 2024-09-16 PROCEDURE — 92978 ENDOLUMINL IVUS OCT C 1ST: CPT | Mod: LC | Performed by: STUDENT IN AN ORGANIZED HEALTH CARE EDUCATION/TRAINING PROGRAM

## 2024-09-16 PROCEDURE — 99153 MOD SED SAME PHYS/QHP EA: CPT | Performed by: STUDENT IN AN ORGANIZED HEALTH CARE EDUCATION/TRAINING PROGRAM

## 2024-09-16 PROCEDURE — 93010 ELECTROCARDIOGRAM REPORT: CPT | Mod: ,,, | Performed by: STUDENT IN AN ORGANIZED HEALTH CARE EDUCATION/TRAINING PROGRAM

## 2024-09-16 PROCEDURE — C1894 INTRO/SHEATH, NON-LASER: HCPCS | Performed by: STUDENT IN AN ORGANIZED HEALTH CARE EDUCATION/TRAINING PROGRAM

## 2024-09-16 PROCEDURE — 85347 COAGULATION TIME ACTIVATED: CPT | Performed by: STUDENT IN AN ORGANIZED HEALTH CARE EDUCATION/TRAINING PROGRAM

## 2024-09-16 PROCEDURE — 99152 MOD SED SAME PHYS/QHP 5/>YRS: CPT | Mod: ,,, | Performed by: STUDENT IN AN ORGANIZED HEALTH CARE EDUCATION/TRAINING PROGRAM

## 2024-09-16 PROCEDURE — 92978 ENDOLUMINL IVUS OCT C 1ST: CPT | Mod: 26,LC,, | Performed by: STUDENT IN AN ORGANIZED HEALTH CARE EDUCATION/TRAINING PROGRAM

## 2024-09-16 PROCEDURE — 99900035 HC TECH TIME PER 15 MIN (STAT)

## 2024-09-16 PROCEDURE — 93005 ELECTROCARDIOGRAM TRACING: CPT

## 2024-09-16 PROCEDURE — 93458 L HRT ARTERY/VENTRICLE ANGIO: CPT | Mod: XU | Performed by: STUDENT IN AN ORGANIZED HEALTH CARE EDUCATION/TRAINING PROGRAM

## 2024-09-16 PROCEDURE — 25000003 PHARM REV CODE 250: Performed by: STUDENT IN AN ORGANIZED HEALTH CARE EDUCATION/TRAINING PROGRAM

## 2024-09-16 PROCEDURE — 63600175 PHARM REV CODE 636 W HCPCS: Performed by: STUDENT IN AN ORGANIZED HEALTH CARE EDUCATION/TRAINING PROGRAM

## 2024-09-16 PROCEDURE — 99232 SBSQ HOSP IP/OBS MODERATE 35: CPT | Mod: 25,,, | Performed by: STUDENT IN AN ORGANIZED HEALTH CARE EDUCATION/TRAINING PROGRAM

## 2024-09-16 PROCEDURE — 85730 THROMBOPLASTIN TIME PARTIAL: CPT | Performed by: STUDENT IN AN ORGANIZED HEALTH CARE EDUCATION/TRAINING PROGRAM

## 2024-09-16 PROCEDURE — 92921 PR PTCA, ADD'L VESSEL: CPT | Mod: LC,,, | Performed by: STUDENT IN AN ORGANIZED HEALTH CARE EDUCATION/TRAINING PROGRAM

## 2024-09-16 PROCEDURE — 85025 COMPLETE CBC W/AUTO DIFF WBC: CPT | Performed by: FAMILY MEDICINE

## 2024-09-16 PROCEDURE — C1725 CATH, TRANSLUMIN NON-LASER: HCPCS | Performed by: STUDENT IN AN ORGANIZED HEALTH CARE EDUCATION/TRAINING PROGRAM

## 2024-09-16 PROCEDURE — 36415 COLL VENOUS BLD VENIPUNCTURE: CPT | Performed by: STUDENT IN AN ORGANIZED HEALTH CARE EDUCATION/TRAINING PROGRAM

## 2024-09-16 PROCEDURE — 92920 PRQ TRLUML C ANGIOP 1ART&/BR: CPT | Mod: LC,,, | Performed by: STUDENT IN AN ORGANIZED HEALTH CARE EDUCATION/TRAINING PROGRAM

## 2024-09-16 PROCEDURE — 80048 BASIC METABOLIC PNL TOTAL CA: CPT | Performed by: FAMILY MEDICINE

## 2024-09-16 PROCEDURE — 99152 MOD SED SAME PHYS/QHP 5/>YRS: CPT | Performed by: STUDENT IN AN ORGANIZED HEALTH CARE EDUCATION/TRAINING PROGRAM

## 2024-09-16 PROCEDURE — 02F03ZZ FRAGMENTATION IN CORONARY ARTERY, ONE ARTERY, PERCUTANEOUS APPROACH: ICD-10-PCS | Performed by: STUDENT IN AN ORGANIZED HEALTH CARE EDUCATION/TRAINING PROGRAM

## 2024-09-16 PROCEDURE — B240ZZ3 ULTRASONOGRAPHY OF SINGLE CORONARY ARTERY, INTRAVASCULAR: ICD-10-PCS | Performed by: STUDENT IN AN ORGANIZED HEALTH CARE EDUCATION/TRAINING PROGRAM

## 2024-09-16 PROCEDURE — 92972 PERQ TRLUML CORONRY LITHOTRP: CPT | Mod: ,,, | Performed by: STUDENT IN AN ORGANIZED HEALTH CARE EDUCATION/TRAINING PROGRAM

## 2024-09-16 PROCEDURE — C1887 CATHETER, GUIDING: HCPCS | Performed by: STUDENT IN AN ORGANIZED HEALTH CARE EDUCATION/TRAINING PROGRAM

## 2024-09-16 PROCEDURE — B2111ZZ FLUOROSCOPY OF MULTIPLE CORONARY ARTERIES USING LOW OSMOLAR CONTRAST: ICD-10-PCS | Performed by: STUDENT IN AN ORGANIZED HEALTH CARE EDUCATION/TRAINING PROGRAM

## 2024-09-16 PROCEDURE — C1753 CATH, INTRAVAS ULTRASOUND: HCPCS | Performed by: STUDENT IN AN ORGANIZED HEALTH CARE EDUCATION/TRAINING PROGRAM

## 2024-09-16 PROCEDURE — 27201423 OPTIME MED/SURG SUP & DEVICES STERILE SUPPLY: Performed by: STUDENT IN AN ORGANIZED HEALTH CARE EDUCATION/TRAINING PROGRAM

## 2024-09-16 PROCEDURE — C1761 OPTIME CATH, TRANSLUMINAL INTRAVASC LITHO, CORONARY: HCPCS | Performed by: STUDENT IN AN ORGANIZED HEALTH CARE EDUCATION/TRAINING PROGRAM

## 2024-09-16 PROCEDURE — C1769 GUIDE WIRE: HCPCS | Performed by: STUDENT IN AN ORGANIZED HEALTH CARE EDUCATION/TRAINING PROGRAM

## 2024-09-16 PROCEDURE — 93458 L HRT ARTERY/VENTRICLE ANGIO: CPT | Mod: 26,XU,51, | Performed by: STUDENT IN AN ORGANIZED HEALTH CARE EDUCATION/TRAINING PROGRAM

## 2024-09-16 PROCEDURE — 92921 HC PTCA , ADD'L BRANCH: CPT | Mod: LC | Performed by: STUDENT IN AN ORGANIZED HEALTH CARE EDUCATION/TRAINING PROGRAM

## 2024-09-16 PROCEDURE — 4A023N7 MEASUREMENT OF CARDIAC SAMPLING AND PRESSURE, LEFT HEART, PERCUTANEOUS APPROACH: ICD-10-PCS | Performed by: STUDENT IN AN ORGANIZED HEALTH CARE EDUCATION/TRAINING PROGRAM

## 2024-09-16 PROCEDURE — 92972 PERQ TRLUML CORONRY LITHOTRP: CPT | Performed by: STUDENT IN AN ORGANIZED HEALTH CARE EDUCATION/TRAINING PROGRAM

## 2024-09-16 RX ORDER — LIDOCAINE HYDROCHLORIDE 10 MG/ML
INJECTION, SOLUTION INFILTRATION; PERINEURAL
Status: DISCONTINUED | OUTPATIENT
Start: 2024-09-16 | End: 2024-09-17 | Stop reason: HOSPADM

## 2024-09-16 RX ORDER — ASPIRIN 81 MG/1
81 TABLET ORAL DAILY
Status: DISCONTINUED | OUTPATIENT
Start: 2024-09-17 | End: 2024-09-17

## 2024-09-16 RX ORDER — ACETAMINOPHEN 325 MG/1
650 TABLET ORAL EVERY 4 HOURS PRN
Status: DISCONTINUED | OUTPATIENT
Start: 2024-09-16 | End: 2024-09-17 | Stop reason: HOSPADM

## 2024-09-16 RX ORDER — VERAPAMIL HYDROCHLORIDE 2.5 MG/ML
INJECTION, SOLUTION INTRAVENOUS
Status: DISCONTINUED | OUTPATIENT
Start: 2024-09-16 | End: 2024-09-17 | Stop reason: HOSPADM

## 2024-09-16 RX ORDER — MIDAZOLAM HYDROCHLORIDE 1 MG/ML
INJECTION INTRAMUSCULAR; INTRAVENOUS
Status: DISCONTINUED | OUTPATIENT
Start: 2024-09-16 | End: 2024-09-17 | Stop reason: HOSPADM

## 2024-09-16 RX ORDER — MUPIROCIN 20 MG/G
OINTMENT TOPICAL 2 TIMES DAILY
Status: DISCONTINUED | OUTPATIENT
Start: 2024-09-16 | End: 2024-09-17 | Stop reason: HOSPADM

## 2024-09-16 RX ORDER — FENTANYL CITRATE 50 UG/ML
INJECTION, SOLUTION INTRAMUSCULAR; INTRAVENOUS
Status: DISCONTINUED | OUTPATIENT
Start: 2024-09-16 | End: 2024-09-17 | Stop reason: HOSPADM

## 2024-09-16 RX ORDER — HEPARIN SODIUM 200 [USP'U]/100ML
INJECTION, SOLUTION INTRAVENOUS
Status: DISCONTINUED | OUTPATIENT
Start: 2024-09-16 | End: 2024-09-17 | Stop reason: HOSPADM

## 2024-09-16 RX ORDER — HEPARIN SODIUM 1000 [USP'U]/ML
INJECTION, SOLUTION INTRAVENOUS; SUBCUTANEOUS
Status: DISCONTINUED | OUTPATIENT
Start: 2024-09-16 | End: 2024-09-17 | Stop reason: HOSPADM

## 2024-09-16 RX ORDER — ONDANSETRON 8 MG/1
8 TABLET, ORALLY DISINTEGRATING ORAL EVERY 8 HOURS PRN
Status: DISCONTINUED | OUTPATIENT
Start: 2024-09-16 | End: 2024-09-17 | Stop reason: HOSPADM

## 2024-09-16 RX ADMIN — CLOPIDOGREL 75 MG: 75 TABLET, FILM COATED ORAL at 09:09

## 2024-09-16 RX ADMIN — HYDRALAZINE HYDROCHLORIDE 50 MG: 25 TABLET ORAL at 10:09

## 2024-09-16 RX ADMIN — ATORVASTATIN CALCIUM 40 MG: 40 TABLET, FILM COATED ORAL at 10:09

## 2024-09-16 RX ADMIN — MUPIROCIN: 20 OINTMENT TOPICAL at 10:09

## 2024-09-16 RX ADMIN — CARVEDILOL 25 MG: 25 TABLET, FILM COATED ORAL at 07:09

## 2024-09-16 RX ADMIN — LEVOTHYROXINE SODIUM 75 MCG: 25 TABLET ORAL at 06:09

## 2024-09-16 NOTE — ASSESSMENT & PLAN NOTE
Patient with Long standing persistent (>12 months) atrial fibrillation which is controlled currently with Beta Blocker. Patient is currently in sinus rhythm.[id number] Score: 1. Anticoagulation indicated. Anticoagulation done with heparin drip , Eliquis is on hold because patient is planned for cardiac catheterization today  .

## 2024-09-16 NOTE — PLAN OF CARE
AAOx4; POC reviewed & verbalizes understanding.  Admit DX: Angina/SOB  Afebrile. No acute events on shift. No deficits noted  IV access & IVF: PIV, saline locked  BM: 9/13/24  : oliguric, dark urine oliguric  Appetite: NPO  Cath Lab today: L groin incision (gauze/tegaderm pressure dressing dry & intact, no hematoma noted)...balloon & shockwave to 1st marginal lesion & circ lesion  Bed alarm set; fall precautions maintained.   Bed locked in lowest position, side rails up x2, call light within reach, environment clear. Encouraged pt to call nurse with any concerns.  Safety measures maintained

## 2024-09-16 NOTE — PROGRESS NOTES
Progress Note  Nephrology      Consult Requested By: Nima Ervin MD      SUBJECTIVE:     Overnight events  Patient is a 64 y.o. male     Patient Active Problem List   Diagnosis    HTN (hypertension)    PAD (peripheral artery disease)    Claudication in peripheral vascular disease    DJD (degenerative joint disease), lumbar    Hyperlipidemia    Atherosclerosis of native artery of both lower extremities with intermittent claudication    Venous insufficiency of both lower extremities    Abnormal cardiovascular stress test    Coronary artery disease    Bilateral carotid artery stenosis    Cardiac arrest    Nephrotic range proteinuria    Nuclear sclerosis, bilateral    COPD exacerbation    Anemia due to chronic kidney disease, on chronic dialysis    Chest pain    Persistent atrial fibrillation    Hypothyroidism    Unstable angina    Paroxysmal atrial fibrillation    EBV infection    CMV (cytomegalovirus infection)    Closed fracture of transverse process of lumbar vertebra    ESRD (end stage renal disease) on dialysis    Fall    Membranous nephropathy determined by biopsy    S/P arteriovenous (AV) graft placement    Hemodialysis catheter dysfunction    NSTEMI (non-ST elevated myocardial infarction)    Anticoagulated    Pulmonary edema    Acute congestive heart failure    Shortness of breath    S/P drug eluting coronary stent placement    Atherosclerosis of native coronary artery of native heart with unstable angina pectoris     Past Medical History:   Diagnosis Date    Acute congestive heart failure 9/13/2024    Allergy     Anemia     Anticoagulant long-term use     Arthritis     CKD (chronic kidney disease) stage 4, GFR 15-29 ml/min     COPD (chronic obstructive pulmonary disease) 08/20/2021    Coronary artery disease     Heart attack 10/04/2019    Hematuria     Hemothorax     Hyperlipidemia     Hypertension     Hyperuricemia     Hypocalcemia     Hypokalemia     Hypophosphatemia     Hypothyroidism 3/2/2023     PAD (peripheral artery disease)     Proteinuria     Vitamin D deficiency               OBJECTIVE:     Vitals:    09/16/24 1545 09/16/24 1600 09/16/24 1630 09/16/24 1700   BP: (!) 103/58 (!) 107/59 (!) 104/56 (!) 105/58   BP Location: Left leg Left leg Left leg Right leg   Patient Position: Lying Lying Lying Lying   Pulse: (!) 58 (!) 59 (!) 58 (!) 56   Resp: 17 18 15 17   Temp:       TempSrc:       SpO2: 96% 99% 99% 95%   Weight:       Height:           Temp: 97.5 °F (36.4 °C) (09/16/24 1500)  Pulse: (!) 56 (09/16/24 1700)  Resp: 17 (09/16/24 1700)  BP: (!) 105/58 (09/16/24 1700)  SpO2: 95 % (09/16/24 1700)              Medications:   [START ON 9/17/2024] aspirin  81 mg Oral Daily    atorvastatin  40 mg Oral QHS    carvediloL  25 mg Oral BID WM    clopidogreL  75 mg Oral Daily    hydrALAZINE  50 mg Oral Q8H    isosorbide mononitrate  30 mg Oral Daily    levothyroxine  75 mcg Oral Before breakfast    mupirocin   Nasal BID    predniSONE  60 mg Oral Daily      heparin (porcine)    Continuous  mL/hr at 09/16/24 1237 1,500 Units/hr at 09/16/24 1237               Physical Exam:  Seen in recovery  General appearance:NAD  Lungs: Pulse oximeter 100 % O2  RR 12  Heart: Pulse 60  Abdomen: soft  Extremities: no edema  Skin:dry        Laboratory:  ABG  Labs reviewed  Recent Results (from the past 336 hour(s))   Basic Metabolic Panel (BMP)    Collection Time: 09/16/24  3:30 AM   Result Value Ref Range    Sodium 131 (L) 136 - 145 mmol/L    Potassium 3.7 3.5 - 5.1 mmol/L    Chloride 93 (L) 95 - 110 mmol/L    CO2 21 (L) 23 - 29 mmol/L    BUN 76 (H) 8 - 23 mg/dL    Creatinine 6.9 (H) 0.5 - 1.4 mg/dL    Calcium 9.1 8.7 - 10.5 mg/dL    Anion Gap 17 (H) 8 - 16 mmol/L   Basic Metabolic Panel (BMP)    Collection Time: 09/15/24  4:26 AM   Result Value Ref Range    Sodium 132 (L) 136 - 145 mmol/L    Potassium 3.5 3.5 - 5.1 mmol/L    Chloride 97 95 - 110 mmol/L    CO2 22 (L) 23 - 29 mmol/L    BUN 40 (H) 8 - 23 mg/dL    Creatinine 4.9  "(H) 0.5 - 1.4 mg/dL    Calcium 9.7 8.7 - 10.5 mg/dL    Anion Gap 13 8 - 16 mmol/L   Basic Metabolic Panel (BMP)    Collection Time: 09/14/24  4:33 AM   Result Value Ref Range    Sodium 133 (L) 136 - 145 mmol/L    Potassium 3.1 (L) 3.5 - 5.1 mmol/L    Chloride 94 (L) 95 - 110 mmol/L    CO2 25 23 - 29 mmol/L    BUN 36 (H) 8 - 23 mg/dL    Creatinine 4.9 (H) 0.5 - 1.4 mg/dL    Calcium 9.0 8.7 - 10.5 mg/dL    Anion Gap 14 8 - 16 mmol/L     Recent Results (from the past 336 hour(s))   CBC with Automated Differential    Collection Time: 09/16/24  3:30 AM   Result Value Ref Range    WBC 8.50 3.90 - 12.70 K/uL    Hemoglobin 9.5 (L) 14.0 - 18.0 g/dL    Hematocrit 28.0 (L) 40.0 - 54.0 %    Platelets 163 150 - 450 K/uL   CBC with Automated Differential    Collection Time: 09/15/24  4:26 AM   Result Value Ref Range    WBC 9.21 3.90 - 12.70 K/uL    Hemoglobin 9.8 (L) 14.0 - 18.0 g/dL    Hematocrit 29.3 (L) 40.0 - 54.0 %    Platelets 121 (L) 150 - 450 K/uL   CBC with Automated Differential    Collection Time: 09/14/24  4:33 AM   Result Value Ref Range    WBC 7.06 3.90 - 12.70 K/uL    Hemoglobin 9.3 (L) 14.0 - 18.0 g/dL    Hematocrit 27.9 (L) 40.0 - 54.0 %    Platelets 123 (L) 150 - 450 K/uL     Urinalysis  No results for input(s): "COLORU", "CLARITYU", "SPECGRAV", "PHUR", "PROTEINUA", "GLUCOSEU", "BILIRUBINCON", "BLOODU", "WBCU", "RBCU", "BACTERIA", "MUCUS", "NITRITE", "LEUKOCYTESUR", "UROBILINOGEN", "HYALINECASTS" in the last 24 hours.    Diagnostic Results:  X-Ray: Reviewed  US: Reviewed  Echo: Reviewed  ACCESS    ASSESSMENT/PLAN:   S/p   PTCA and intravascular lithotripsy of Lcx and OM with excellent results   Patent stents in RCA and LAD  ESRD  Metabolic bone disease  Anemia   Hypotension  Dialysis in am  "

## 2024-09-16 NOTE — ASSESSMENT & PLAN NOTE
Chronic, controlled. Latest blood pressure and vitals reviewed-     Temp:  [97.6 °F (36.4 °C)-98.2 °F (36.8 °C)]   Pulse:  [63-73]   Resp:  [18-19]   BP: (108-131)/(54-71)   SpO2:  [93 %-98 %] .   Home meds for hypertension were reviewed and noted below.   Hypertension Medications               amLODIPine (NORVASC) 5 MG tablet Take 1 tablet (5 mg total) by mouth once daily.    carvediloL (COREG) 25 MG tablet Take 1 tablet (25 mg total) by mouth 2 (two) times daily with meals.    eplerenone (INSPRA) 50 MG Tab Take 1 tablet (50 mg total) by mouth once daily.    hydrALAZINE (APRESOLINE) 50 MG tablet Take 1 tablet (50 mg total) by mouth every 8 (eight) hours.    isosorbide mononitrate (IMDUR) 30 MG 24 hr tablet Take 1 tablet (30 mg total) by mouth once daily.    nitroGLYCERIN (NITROSTAT) 0.4 MG SL tablet Place 1 tablet (0.4 mg total) under the tongue every 5 (five) minutes as needed for Chest pain (for a max of 3 tabs in 15 minutes).            While in the hospital, will manage blood pressure as follows; Continue home antihypertensive regimen    Will utilize p.r.n. blood pressure medication only if patient's blood pressure greater than 180/110 and he develops symptoms such as worsening chest pain or shortness of breath.

## 2024-09-16 NOTE — ASSESSMENT & PLAN NOTE
Creatine stable for now. BMP reviewed- noted Estimated Creatinine Clearance: 10.8 mL/min (A) (based on SCr of 6.9 mg/dL (H)). according to latest data. Based on current GFR, CKD stage is end stage.  Monitor UOP and serial BMP and adjust therapy as needed. Renally dose meds. Avoid nephrotoxic medications and procedures.    Nephro consult for HD inpatient

## 2024-09-16 NOTE — PLAN OF CARE
SOCIAL WORK DISCHARGE PLANNING ASSESSMENT    SW completed discharge planning assessment with pt. Pt was easily engaged and education on the role of  was provided. Pt reported he lives with his wife Karmen ([phone]) and grandson. Pt stated he has good support from family and friends and advised Karmen will provide assistance as needed after returning home. Pt stated he has no DME and is not current with  services. Pt reported he receives HD at [facility] in [place] on [address]. Pt stated he drives himself to doctor appointments and he or Karmen will provide transportation home following discharge. No needs for community resources were reported. Pt was encouraged to call with any questions or concerns. Pt verbalized understanding.     Future Appointments   Date Time Provider Department Center   9/27/2024  9:30 AM Adelaide Puga MD [facility] IMPRI [place] [facility]   10/18/2024  1:40 PM Enoch Tirado MD [facility] CARDIO [place] [facility]   12/16/2024  1:40 PM Perez Bell DO [facility]        Patient Active Problem List   Diagnosis    HTN (hypertension)    PAD (peripheral artery disease)    Claudication in peripheral vascular disease    DJD (degenerative joint disease), lumbar    Hyperlipidemia    Atherosclerosis of native artery of both lower extremities with intermittent claudication    Venous insufficiency of both lower extremities    Abnormal cardiovascular stress test    Coronary artery disease    Bilateral carotid artery stenosis    Cardiac arrest    Nephrotic range proteinuria    Nuclear sclerosis, bilateral    COPD exacerbation    Anemia due to chronic kidney disease, on chronic dialysis    Chest pain    Persistent atrial fibrillation    Hypothyroidism    Unstable angina    Paroxysmal atrial fibrillation    EBV infection    CMV (cytomegalovirus infection)    Closed fracture of transverse process of lumbar vertebra    ESRD (end stage renal disease) on dialysis    Fall    Membranous  nephropathy determined by biopsy    S/P arteriovenous (AV) graft placement    Hemodialysis catheter dysfunction    NSTEMI (non-ST elevated myocardial infarction)    Anticoagulated    Pulmonary edema    Acute congestive heart failure    Shortness of breath    S/P drug eluting coronary stent placement    Atherosclerosis of native coronary artery of native heart with unstable angina pectoris      09/16/24 1245   Discharge Assessment   Assessment Type Discharge Planning Assessment   Confirmed/corrected address, phone number and insurance Yes   Confirmed Demographics Correct on Facesheet   Source of Information patient   Communicated FLACO with patient/caregiver Date not available/Unable to determine   Reason For Admission atherosclerosis of native coronary artery of native heart with unstable angina pectoris   People in Home spouse;grandchild(guillermina)   Facility Arrived From: home   Do you expect to return to your current living situation? Yes   Do you have help at home or someone to help you manage your care at home? Yes   Who are your caregiver(s) and their phone number(s)? pt's wife Karmen [phone]   Prior to hospitilization cognitive status: Alert/Oriented   Current cognitive status: Alert/Oriented   Walking or Climbing Stairs Difficulty no   Dressing/Bathing Difficulty no   Equipment Currently Used at Home none   Readmission within 30 days? No   Patient currently being followed by outpatient case management? No   Do you currently have service(s) that help you manage your care at home? No   Do you take prescription medications? Yes   Do you have prescription coverage? Yes   Coverage [employer]   Do you have any problems affording any of your prescribed medications? No   Is the patient taking medications as prescribed? yes   Who is going to help you get home at discharge? pt's wife Karmen [phone]   How do you get to doctors appointments? car, drives self   Are you on dialysis? Yes   Dialysis Name and  Scheduled days [facility] [address]   Discharge Plan A Home with family   Discharge Plan B Home Health   DME Needed Upon Discharge    (TBD)   Discharge Plan discussed with: Patient   Transition of Care Barriers None   Physical Activity   On average, how many days per week do you engage in moderate to strenuous exercise (like a brisk walk)? Pt Unable   On average, how many minutes do you engage in exercise at this level? Pt Unable   Financial Resource Strain   How hard is it for you to pay for the very basics like food, housing, medical care, and heating? Not hard   Housing Stability   In the last 12 months, was there a time when you were not able to pay the mortgage or rent on time? N   At any time in the past 12 months, were you homeless or living in a shelter (including now)? N   Transportation Needs   Has the lack of transportation kept you from medical appointments, meetings, work or from getting things needed for daily living? No   Food Insecurity   Within the past 12 months, you worried that your food would run out before you got the money to buy more. Never true   Within the past 12 months, the food you bought just didn't last and you didn't have money to get more. Never true   Stress   Do you feel stress - tense, restless, nervous, or anxious, or unable to sleep at night because your mind is troubled all the time - these days? Pt Unable   Social Isolation   How often do you feel lonely or isolated from those around you?  Never   Alcohol Use   Q1: How often do you have a drink containing alcohol? Pt Unable   Q2: How many drinks containing alcohol do you have on a typical day when you are drinking? Pt Unable   Q3: How often do you have six or more drinks on one occasion? Pt Unable   Utilities   In the past 12 months has the electric, gas, oil, or water company threatened to shut off services in your home? No   Health Literacy   How often do you need to have someone help you when you read instructions,  pamphlets, or other written material from your doctor or pharmacy? Never   OTHER   Name(s) of People in Home pt's wife Karmen [phone] and pt's grandson

## 2024-09-16 NOTE — SUBJECTIVE & OBJECTIVE
Interval History:   I saw and examined patient at bedside today  Patient feels okay, waiting for cardiac catheterization today    Review of Systems   Constitutional: Negative.    HENT: Negative.     Genitourinary: Negative.    Musculoskeletal: Negative.    Neurological: Negative.    Psychiatric/Behavioral: Negative.       Objective:     Vital Signs (Most Recent):  Temp: 97.8 °F (36.6 °C) (09/16/24 0735)  Pulse: 67 (09/16/24 0735)  Resp: 18 (09/16/24 0735)  BP: (!) 115/57 (09/16/24 0735)  SpO2: 97 % (09/16/24 0745) Vital Signs (24h Range):  Temp:  [97.6 °F (36.4 °C)-98.2 °F (36.8 °C)] 97.8 °F (36.6 °C)  Pulse:  [63-73] 67  Resp:  [18-19] 18  SpO2:  [93 %-98 %] 97 %  BP: (108-131)/(54-71) 115/57     Weight: 70 kg (154 lb 5.2 oz)  Body mass index is 22.14 kg/m².  No intake or output data in the 24 hours ending 09/16/24 1101        Physical Exam  Constitutional:       Appearance: Normal appearance.   HENT:      Head: Normocephalic and atraumatic.   Cardiovascular:      Rate and Rhythm: Normal rate.   Pulmonary:      Effort: Pulmonary effort is normal.   Musculoskeletal:         General: Normal range of motion.   Skin:     General: Skin is warm.   Neurological:      General: No focal deficit present.      Mental Status: He is alert. Mental status is at baseline.   Psychiatric:         Mood and Affect: Mood normal.         Behavior: Behavior normal.             Significant Labs: All pertinent labs within the past 24 hours have been reviewed.  BMP:   Recent Labs   Lab 09/16/24  0330   *   *   K 3.7   CL 93*   CO2 21*   BUN 76*   CREATININE 6.9*   CALCIUM 9.1     CBC:   Recent Labs   Lab 09/15/24  0426 09/16/24  0330   WBC 9.21 8.50   HGB 9.8* 9.5*   HCT 29.3* 28.0*   * 163       Significant Imaging: I have reviewed all pertinent imaging results/findings within the past 24 hours.

## 2024-09-16 NOTE — ASSESSMENT & PLAN NOTE
Trend troponins.  Aspirin,sublingual nitroglycerin p.r.n. chest pain.  Inpatient Cardiology consultation, plan for cardiac cath today

## 2024-09-16 NOTE — PROGRESS NOTES
[facility] Medicine  Progress Note    Patient Name: Domingo Gross  MRN: [id number]  Patient Class: IP- Inpatient   Admission Date: 9/13/2024  Length of Stay: 3 days  Attending Physician: Nima Ervin MD  Primary Care Provider: Perez Bell DO        Subjective:     Principal Problem:Atherosclerosis of native coronary artery of native heart with unstable angina pectoris        HPI:  This is a 64-year-old male with a history of end-stage renal disease on renal dialysis, COPD, hypertension, diabetes mellitus type, and CHF who presents to emergency department with 5 days' history of having worsening shortness of breath and chest discomfort.  Patient was seen in the ED and he was given DuoNebs and Lasix and he had improvement.  However he still was desatting to about 84% and complaining of chest discomfort.  Patient was put on supplemental oxygen and due to patient's presenting symptoms he will require admission for further management.    At time of my examination patient denied any headache, fever, chills, hemoptysis, a complaint of shortness of breath and chest pain.    Overview/Hospital Course:  No notes on file    Interval History:   I saw and examined patient at bedside today  Patient feels okay, waiting for cardiac catheterization today    Review of Systems   Constitutional: Negative.    HENT: Negative.     Genitourinary: Negative.    Musculoskeletal: Negative.    Neurological: Negative.    Psychiatric/Behavioral: Negative.       Objective:     Vital Signs (Most Recent):  Temp: 97.8 °F (36.6 °C) (09/16/24 0735)  Pulse: 67 (09/16/24 0735)  Resp: 18 (09/16/24 0735)  BP: (!) 115/57 (09/16/24 0735)  SpO2: 97 % (09/16/24 0745) Vital Signs (24h Range):  Temp:  [97.6 °F (36.4 °C)-98.2 °F (36.8 °C)] 97.8 °F (36.6 °C)  Pulse:  [63-73] 67  Resp:  [18-19] 18  SpO2:  [93 %-98 %] 97 %  BP: (108-131)/(54-71) 115/57     Weight: 70 kg (154 lb 5.2 oz)  Body mass index is 22.14 kg/m².  No intake or  output data in the 24 hours ending 09/16/24 1101        Physical Exam  Constitutional:       Appearance: Normal appearance.   HENT:      Head: Normocephalic and atraumatic.   Cardiovascular:      Rate and Rhythm: Normal rate.   Pulmonary:      Effort: Pulmonary effort is normal.   Musculoskeletal:         General: Normal range of motion.   Skin:     General: Skin is warm.   Neurological:      General: No focal deficit present.      Mental Status: He is alert. Mental status is at baseline.   Psychiatric:         Mood and Affect: Mood normal.         Behavior: Behavior normal.             Significant Labs: All pertinent labs within the past 24 hours have been reviewed.  BMP:   Recent Labs   Lab 09/16/24  0330   *   *   K 3.7   CL 93*   CO2 21*   BUN 76*   CREATININE 6.9*   CALCIUM 9.1     CBC:   Recent Labs   Lab 09/15/24  0426 09/16/24  0330   WBC 9.21 8.50   HGB 9.8* 9.5*   HCT 29.3* 28.0*   * 163       Significant Imaging: I have reviewed all pertinent imaging results/findings within the past 24 hours.    Assessment/Plan:      Chest pain  Trend troponins.  Aspirin,sublingual nitroglycerin p.r.n. chest pain.  Inpatient Cardiology consultation, plan for cardiac cath today      ESRD (end stage renal disease) on dialysis  Creatine stable for now. BMP reviewed- noted Estimated Creatinine Clearance: 10.8 mL/min (A) (based on SCr of 6.9 mg/dL (H)). according to latest data. Based on current GFR, CKD stage is end stage.  Monitor UOP and serial BMP and adjust therapy as needed. Renally dose meds. Avoid nephrotoxic medications and procedures.    Nephro consult for HD inpatient    Paroxysmal atrial fibrillation  Patient with Long standing persistent (>12 months) atrial fibrillation which is controlled currently with Beta Blocker. Patient is currently in sinus rhythm.[id number] Score: 1. Anticoagulation indicated. Anticoagulation done with heparin drip , Eliquis is on hold because patient is planned for  cardiac catheterization today  .    COPD exacerbation  Patient's COPD is with exacerbation noted by continued dyspnea currently.  Patient is currently on COPD Pathway. Continue scheduled inhalers Steroids and Supplemental oxygen and monitor respiratory status closely.     Improved   Prednisone 60 mg po for 5 days total  Patient started feeling better , no wheezing on exam        Coronary artery disease  Patient with known CAD s/p stent placement and CABG, which is controlled Will continue Plavix and Statin and monitor for S/Sx of angina/ACS. Continue to monitor on telemetry. today  Patient is aware of the plan and agree    HTN (hypertension)  Chronic, controlled. Latest blood pressure and vitals reviewed-     Temp:  [97.6 °F (36.4 °C)-98.2 °F (36.8 °C)]   Pulse:  [63-73]   Resp:  [18-19]   BP: (108-131)/(54-71)   SpO2:  [93 %-98 %] .   Home meds for hypertension were reviewed and noted below.   Hypertension Medications               amLODIPine (NORVASC) 5 MG tablet Take 1 tablet (5 mg total) by mouth once daily.    carvediloL (COREG) 25 MG tablet Take 1 tablet (25 mg total) by mouth 2 (two) times daily with meals.    eplerenone (INSPRA) 50 MG Tab Take 1 tablet (50 mg total) by mouth once daily.    hydrALAZINE (APRESOLINE) 50 MG tablet Take 1 tablet (50 mg total) by mouth every 8 (eight) hours.    isosorbide mononitrate (IMDUR) 30 MG 24 hr tablet Take 1 tablet (30 mg total) by mouth once daily.    nitroGLYCERIN (NITROSTAT) 0.4 MG SL tablet Place 1 tablet (0.4 mg total) under the tongue every 5 (five) minutes as needed for Chest pain (for a max of 3 tabs in 15 minutes).            While in the hospital, will manage blood pressure as follows; Continue home antihypertensive regimen    Will utilize p.r.n. blood pressure medication only if patient's blood pressure greater than 180/110 and he develops symptoms such as worsening chest pain or shortness of breath.      VTE Risk Mitigation (From admission, onward)            Ordered     heparin 25,000 units in dextrose 5% 250 mL (100 units/mL) infusion LOW INTENSITY nomogram - [facility]  Continuous        Question:  Begin at (units/kg/hr)  Answer:  12    09/14/24 1115     heparin 25,000 units in dextrose 5% (100 units/ml) IV bolus from bag LOW INTENSITY nomogram - [facility]  As needed (PRN)        Question:  Heparin Infusion Adjustment (DO NOT MODIFY ANSWER)  Answer:  [email or web address]\epic\Images\Pharmacy\HeparinInfusions\heparin LOW INTENSITY nomogram for [facility] [id number]    09/14/24 1115     heparin 25,000 units in dextrose 5% (100 units/ml) IV bolus from bag LOW INTENSITY nomogram - [facility]  As needed (PRN)        Question:  Heparin Infusion Adjustment (DO NOT MODIFY ANSWER)  Answer:  [email or web address]\epic\Images\Pharmacy\HeparinInfusions\heparin LOW INTENSITY nomogram for [facility] [id number]    09/14/24 1115     IP VTE HIGH RISK PATIENT  Once         09/13/24 2248     Place sequential compression device  Until discontinued         09/13/24 2248                    Discharge Planning   FLACO:      Code Status: Full Code   Is the patient medically ready for discharge?:     Reason for patient still in hospital (select all that apply): Treatment and Consult recommendations                     Nima Ervin MD  Department of [facility] Medicine   [facility]

## 2024-09-16 NOTE — ASSESSMENT & PLAN NOTE
Patient with known CAD s/p stent placement and CABG, which is controlled Will continue Plavix and Statin and monitor for S/Sx of angina/ACS. Continue to monitor on telemetry. today  Patient is aware of the plan and agree

## 2024-09-16 NOTE — INTERVAL H&P NOTE
The patient has been examined and the H&P has been reviewed:    I concur with the findings and no changes have occurred since H&P was written.    Procedure risks, benefits and alternative options discussed and understood by patient/family.          Active Hospital Problems    Diagnosis  POA    *Atherosclerosis of native coronary artery of native heart with unstable angina pectoris [I25.110]  Yes    Shortness of breath [R06.02]  Unknown    S/P drug eluting coronary stent placement [Z95.5]  Not Applicable    Acute congestive heart failure [I50.9]  Yes    ESRD (end stage renal disease) on dialysis [N18.6, Z99.2]  Not Applicable    Paroxysmal atrial fibrillation [I48.0]  Yes    Chest pain [R07.9]  Yes    COPD exacerbation [J44.1]  Yes    Coronary artery disease [I25.10]  Yes         3/29/2019    S/p LHC via R radial           LM, LAD, and LCX are patent with luminal irregularities  RCA mid 95% calcified lesion  Normal EF with LVEDP 8 mmHg           PCI of mid LAD with 2.5 x 30 and 2.5 x 15 mm Resolute REZA  PCI of proximal RCA to treat guide dissection with 3.5 x 22 Resolute REZA        10/11/2019 for cardiac arrest        S/p staged LCX PCI                    L CFA access              IVUS guided PCI              3.0 x 15 mm Resolute REZA post dilated with 3.5 NC balloon           PAD (peripheral artery disease) [I73.9]  Yes     6/13/2014      1. Three vessel runoff below the knee bilaterally.  2. Severe disease of the terminal aorta.  3. Successful PTAS.  4. Bilateral common iliac reconstruction with 8 x 39 mm stent extending in the aorta  5. Right common illiac was treated with an overlapping 9 x 60 mm SES   6. Left common iliac was treated with an overlapping 8 x 80 mm SES down to external iliac  7. All stents were post dilated with 9.0 x 60 mm balloons  8. Moderate bilateral SFA disease  9. Chronically occluded left internal iliac artery        6/12/2015      1. 80% mid left SFA with a 20 mmHg resting mean  gradient  2. Atherectomy with 2.2 Phonenix Volcano catheter  3. PTA with 6.0 x 100 mm Lutonix drug coated balloon        6/9/2017      Patent bilateral GRUPO/EIA stents  L EIA PTAS 9.0 x 80 mm Life stent post dilated with 8.0 mm balloon  Bilateral 70-80% SFA stenosis with 3 vessel run off          3/2018      L SFA intervention for claudication   75% L SFA with 3 vessel run off   7 mmHg resting and 33 mmHg hyperemic mean gradient         L CFA antegrade access   PTA with 6.0 x 150 mm   PTA with 6.0 x 150 mm Lutonix   3 vessel run off           4/13/2018       S/p R SFA PTA for winsome IIb claudication   R CFA antegrade access         R CFA with 50% stenosis-heavily calcified lesion               Baseline /90         R SFA with 70% stenosis with a significant resting and hyperemic mean gradient     3 vessel run off             PTA of R SFA with 6.0 x 150 + 6.0 x 60 mm Lutonix DCB        5/2/2018   Patent arteries post revascularization        2/2019     R 0.84 to 0.27   L 0.90 to 0.66   After ambulation   Severe R calf claudication        Peripheral angiogram 5/10/2019                   95% R CFA stenosis; heavily calcified    Patent SFA, POP + 3 vessel run off      Peripheral intervention 7/12/2019    S/p right CFA revascularization for winsome IIb claudication       Assisted JOSY approach   L radial access for visualization   5-6 slender R PT access for delivery of therapy          Atherectomy with SilverHawk catheter   PTA with 7.0 x 40 mm Lutonix DCB            HTN (hypertension) [I10]  Yes      Resolved Hospital Problems   No resolved problems to display.

## 2024-09-16 NOTE — PLAN OF CARE
Patient transferred to recovery cath lab slot 4 via stretcher with side rails up x2 .  Pt AAO X3 and able to follow commands. Pt is stable when connecting to cardiac monitors. VSS. Left groin with sheath intact with dsg c.d.i. no bleeding or hematoma noted. +2 mag radial pulses palpated. Palpated mag pedal and post tib pulses. Skin normal in color and warm to touch, <3 sec cap refill.  Fall risk precautions given and patient acknowledges.  AIDET completed to pt.  Will continue to monitor patient.  Updated pt's spouse via telephone.

## 2024-09-16 NOTE — PLAN OF CARE
Report given to dina Tate nurse.  All questions answered. Pt resting quietly with NAD noted. VSS.

## 2024-09-17 VITALS
HEIGHT: 70 IN | WEIGHT: 154.31 LBS | TEMPERATURE: 98 F | RESPIRATION RATE: 18 BRPM | BODY MASS INDEX: 22.09 KG/M2 | SYSTOLIC BLOOD PRESSURE: 127 MMHG | DIASTOLIC BLOOD PRESSURE: 65 MMHG | OXYGEN SATURATION: 97 % | HEART RATE: 69 BPM

## 2024-09-17 LAB
OHS QRS DURATION: 164 MS
OHS QRS DURATION: 166 MS
OHS QTC CALCULATION: 530 MS
OHS QTC CALCULATION: 573 MS
POCT GLUCOSE: 94 MG/DL (ref 70–110)

## 2024-09-17 PROCEDURE — 94761 N-INVAS EAR/PLS OXIMETRY MLT: CPT

## 2024-09-17 PROCEDURE — 80100016 HC MAINTENANCE HEMODIALYSIS

## 2024-09-17 PROCEDURE — 25000003 PHARM REV CODE 250: Performed by: STUDENT IN AN ORGANIZED HEALTH CARE EDUCATION/TRAINING PROGRAM

## 2024-09-17 PROCEDURE — 99900035 HC TECH TIME PER 15 MIN (STAT)

## 2024-09-17 PROCEDURE — 99233 SBSQ HOSP IP/OBS HIGH 50: CPT | Mod: ,,, | Performed by: NURSE PRACTITIONER

## 2024-09-17 PROCEDURE — 25000003 PHARM REV CODE 250: Performed by: INTERNAL MEDICINE

## 2024-09-17 PROCEDURE — 63600175 PHARM REV CODE 636 W HCPCS: Performed by: STUDENT IN AN ORGANIZED HEALTH CARE EDUCATION/TRAINING PROGRAM

## 2024-09-17 RX ORDER — FUROSEMIDE 80 MG/1
80 TABLET ORAL EVERY OTHER DAY
Qty: 30 TABLET | Refills: 11 | Status: SHIPPED | OUTPATIENT
Start: 2024-09-17 | End: 2024-09-17

## 2024-09-17 RX ORDER — IODIXANOL 320 MG/ML
INJECTION, SOLUTION INTRAVASCULAR
Status: DISCONTINUED | OUTPATIENT
Start: 2024-09-16 | End: 2024-09-17 | Stop reason: HOSPADM

## 2024-09-17 RX ORDER — ONDANSETRON HYDROCHLORIDE 2 MG/ML
4 INJECTION, SOLUTION INTRAVENOUS EVERY 8 HOURS PRN
Status: DISCONTINUED | OUTPATIENT
Start: 2024-09-17 | End: 2024-09-17 | Stop reason: HOSPADM

## 2024-09-17 RX ORDER — NAPROXEN SODIUM 220 MG/1
81 TABLET, FILM COATED ORAL DAILY
Status: DISCONTINUED | OUTPATIENT
Start: 2024-09-17 | End: 2024-09-17

## 2024-09-17 RX ORDER — IPRATROPIUM BROMIDE AND ALBUTEROL SULFATE 2.5; .5 MG/3ML; MG/3ML
3 SOLUTION RESPIRATORY (INHALATION) EVERY 6 HOURS PRN
Qty: 90 ML | Refills: 3 | Status: SHIPPED | OUTPATIENT
Start: 2024-09-17 | End: 2025-09-17

## 2024-09-17 RX ORDER — FUROSEMIDE 80 MG/1
80 TABLET ORAL EVERY OTHER DAY
Qty: 15 TABLET | Refills: 11 | Status: SHIPPED | OUTPATIENT
Start: 2024-09-17 | End: 2024-09-25 | Stop reason: SDUPTHER

## 2024-09-17 RX ADMIN — PREDNISONE 60 MG: 20 TABLET ORAL at 11:09

## 2024-09-17 RX ADMIN — ISOSORBIDE MONONITRATE 30 MG: 30 TABLET, EXTENDED RELEASE ORAL at 11:09

## 2024-09-17 RX ADMIN — HYDRALAZINE HYDROCHLORIDE 50 MG: 25 TABLET ORAL at 01:09

## 2024-09-17 RX ADMIN — MUPIROCIN: 20 OINTMENT TOPICAL at 11:09

## 2024-09-17 RX ADMIN — LEVOTHYROXINE SODIUM 75 MCG: 25 TABLET ORAL at 06:09

## 2024-09-17 RX ADMIN — CARVEDILOL 25 MG: 25 TABLET, FILM COATED ORAL at 11:09

## 2024-09-17 RX ADMIN — CLOPIDOGREL 75 MG: 75 TABLET, FILM COATED ORAL at 11:09

## 2024-09-17 RX ADMIN — APIXABAN 5 MG: 5 TABLET, FILM COATED ORAL at 01:09

## 2024-09-17 NOTE — PLAN OF CARE
Patient transferred to floor on tele monitor.  VSS. No c/o pain. Patient attached to tele monitor upon arrival in room 404.  Left groin with gauze/tegaderm CDI. No bleeding or hematoma noted.   RN at bedside X2 and assessed pt access site.  All questions answered. VSS.

## 2024-09-17 NOTE — ASSESSMENT & PLAN NOTE
- previous PCI in June  - [facility] yesterday with patent LAD and RCA stents; s/p PTCA and lithotripsy to LCX and OM- presented with SOB felt to be anginal equivalent  - reports symptoms resolved since admission  - originally recommend triple therapy for one month but discussed this AM with Dr. Salazar with recommendations for Plavix and Eliquis only given bleeding risk

## 2024-09-17 NOTE — HOSPITAL COURSE
Per Dr. Salazar consult/progress notes 9/14/2024-9/15/2024 65 yo gentleman with hx of HTN, ESRD on HD, PAD with multiple revascularization, complex CAD (mid LAD/prox RCA PCI 3/2019 and prox LCA in 10/2019, bifurcation Lcx-OM PCI with ISR/ s/p PTCA) presents with SOB for 5 days which is similar to his previous presentation required interventions. Per patient he did not missed any of his HD 3 hrs sessions. Denies cp, palpitations, presyncope/dizziness, syncope, orthopnea, PND, bendopnea, decrease ET, NVDC, fever, chills.     CAD s/p PCIs--> Continue BB, imdur, hydralazine, DAPT, statin, heparin gtt for pAFIB -discontinued eliquis (last 09/14 at 10:40 am) planning for coronary angiogram+/-PCI Monday- groin access. NPO Monday MN. Telemetry  PAD on DAPT, eliquis. Stable.   HTN- continue home medications  ESRD- nephology [facility] consulted  HLD continue statin    Admitted to [facility] Cardiology consulted. Troponin .03-.06    9/17/2024 [facility] yesterday with following results:    Left Main Coronary Artery: The left main is a large caliber vessel arising normally from the left sinus of valsalva.  It bifurcates into the left anterior descending and left circumflex coronary artery.  It is free of evidence of obstructive coronary artery disease. YADIRA III flow  Left Anterior Descending: The left anterior descending coronary artery is a large caliber vessel arising normally from the left main and extending to the apex.  It gives rise to small to moderate caliber diagonal arteries.  Stent are patent with minimal ISR. YADIRA III flow  Left Circumflex: The left circumflex is a large caliber vessel arising normally from the left main coronary artery, it is non-dominant.  It gives rise to moderate caliber obtuse marginal branches.  Ostium to prox stent into true Lcx 99% occluded and OM stent is completely occluded with microchannels. Right to left collaterals appreciated. YADIRA II flow.   Right Coronary Artery: The  right coronary artery is a large caliber vessel arising normally from the right sinus of valsalva.  It is dominant giving rise to a PDA and PLV branch. Stents patent with mild ISR, distal RCA and PDA with 70-80% stenosis but are small vessels <2.0mm. YADIRA III flow    Assessment:   PTCA and intravascular lithotripsy of Lcx and OM with excellent results   Patent stents in RCA and LAD     Plan:   Continue DAPT and resume eliquis tomorrow am--> will continue triple therapy for one month and will discontinue aspirin after 4 weeks.   Optimized medical regimen  HD post cath  Will follow up as an outpatient.     Stable overnight. HR and BP stable. No complaints of chest pain overnight

## 2024-09-17 NOTE — PROGRESS NOTES
[place] - Telemetry  Cardiology  Progress Note    Patient Name: Domingo Gross  MRN: [id number]  Admission Date: 9/13/2024  Hospital Length of Stay: 4 days  Code Status: Full Code   Attending Physician: Yolanda Zambrano MD   Primary Care Physician: Perez Bell DO  Expected Discharge Date: 9/17/2024  Principal Problem:Atherosclerosis of native coronary artery of native heart with unstable angina pectoris    Subjective:     Hospital Course:   Per Dr. Salazar consult/progress notes 9/14/2024-9/15/2024 63 yo gentleman with hx of HTN, ESRD on HD, PAD with multiple revascularization, complex CAD (mid LAD/prox RCA PCI 3/2019 and prox LCA in 10/2019, bifurcation Lcx-OM PCI with ISR/ s/p PTCA) presents with SOB for 5 days which is similar to his previous presentation required interventions. Per patient he did not missed any of his HD 3 hrs sessions. Denies cp, palpitations, presyncope/dizziness, syncope, orthopnea, PND, bendopnea, decrease ET, NVDC, fever, chills.     CAD s/p PCIs--> Continue BB, imdur, hydralazine, DAPT, statin, heparin gtt for pAFIB -discontinued eliquis (last 09/14 at 10:40 am) planning for coronary angiogram+/-PCI Monday- groin access. NPO Monday MN. Telemetry  PAD on DAPT, eliquis. Stable.   HTN- continue home medications  ESRD- nephology [facility] consulted  HLD continue statin    Admitted to [facility] Cardiology consulted. Troponin .03-.06    9/17/2024 [facility] yesterday with following results:    Left Main Coronary Artery: The left main is a large caliber vessel arising normally from the left sinus of valsalva.  It bifurcates into the left anterior descending and left circumflex coronary artery.  It is free of evidence of obstructive coronary artery disease. YADIRA III flow  Left Anterior Descending: The left anterior descending coronary artery is a large caliber vessel arising normally from the left main and extending to the apex.  It gives rise to small to moderate caliber  diagonal arteries.  Stent are patent with minimal ISR. YADIRA III flow  Left Circumflex: The left circumflex is a large caliber vessel arising normally from the left main coronary artery, it is non-dominant.  It gives rise to moderate caliber obtuse marginal branches.  Ostium to prox stent into true Lcx 99% occluded and OM stent is completely occluded with microchannels. Right to left collaterals appreciated. YADIRA II flow.   Right Coronary Artery: The right coronary artery is a large caliber vessel arising normally from the right sinus of valsalva.  It is dominant giving rise to a PDA and PLV branch. Stents patent with mild ISR, distal RCA and PDA with 70-80% stenosis but are small vessels <2.0mm. YADIRA III flow    Assessment:   PTCA and intravascular lithotripsy of Lcx and OM with excellent results   Patent stents in RCA and LAD     Plan:   Continue DAPT and resume eliquis tomorrow am--> will continue triple therapy for one month and will discontinue aspirin after 4 weeks.   Optimized medical regimen  HD post cath  Will follow up as an outpatient.     Stable overnight. HR and BP stable. No complaints of chest pain overnight         Review of Systems   Constitutional: Negative for chills, decreased appetite, diaphoresis, fever, malaise/fatigue, weight gain and weight loss.   Cardiovascular:  Negative for chest pain, claudication, dyspnea on exertion, irregular heartbeat, leg swelling, near-syncope, orthopnea, palpitations and paroxysmal nocturnal dyspnea.   Respiratory:  Negative for cough, shortness of breath, snoring, sputum production and wheezing.    Endocrine: Negative for cold intolerance, heat intolerance, polydipsia, polyphagia and polyuria.   Skin:  Negative for color change, dry skin, itching, nail changes and poor wound healing.   Musculoskeletal:  Negative for back pain, gout, joint pain and joint swelling.   Gastrointestinal:  Negative for bloating, abdominal pain, constipation, diarrhea, hematemesis,  hematochezia, melena, nausea and vomiting.   Genitourinary:  Negative for dysuria, hematuria and nocturia.   Neurological:  Negative for dizziness, headaches, light-headedness, numbness, paresthesias and weakness.   Psychiatric/Behavioral:  Negative for altered mental status, depression and memory loss.      Objective:     Vital Signs (Most Recent):  Temp: 98 °F (36.7 °C) (09/17/24 0846)  Pulse: 60 (09/17/24 0855)  Resp: 18 (09/17/24 0846)  BP: 132/68 (09/17/24 0855)  SpO2: 97 % (09/17/24 0802) Vital Signs (24h Range):  Temp:  [96.5 °F (35.8 °C)-98.2 °F (36.8 °C)] 98 °F (36.7 °C)  Pulse:  [56-95] 60  Resp:  [15-23] 18  SpO2:  [95 %-100 %] 97 %  BP: ()/(20-76) 132/68     Weight: 70 kg (154 lb 5.2 oz)  Body mass index is 22.14 kg/m².     SpO2: 97 %         Intake/Output Summary (Last 24 hours) at 9/17/2024 1021  Last data filed at 9/17/2024 0425  Gross per 24 hour   Intake 120 ml   Output 900 ml   Net -780 ml       Lines/Drains/Airways       Peripheral Intravenous Line  Duration                  Hemodialysis AV Fistula Left upper arm -- days         Peripheral IV - Single Lumen 09/13/24 1513 20 G Right Antecubital 3 days         Peripheral IV - Single Lumen 09/14/24 2200 20 G Anterior;Right Upper Arm 2 days                       Physical Exam  Constitutional:       General: He is not in acute distress.     Appearance: He is well-developed.   Neck:      Vascular: No JVD.   Cardiovascular:      Rate and Rhythm: Normal rate and regular rhythm.      Heart sounds: No murmur heard.     No gallop.   Pulmonary:      Effort: Pulmonary effort is normal. No respiratory distress.      Breath sounds: Normal breath sounds. No wheezing.   Abdominal:      General: Bowel sounds are normal. There is no distension.      Palpations: Abdomen is soft.      Tenderness: There is no abdominal tenderness.   Musculoskeletal:      Cervical back: Normal range of motion and neck supple.   Skin:     General: Skin is warm and dry.    Neurological:      Mental Status: He is alert.      Comments: Disoriented    Psychiatric:         Behavior: Behavior normal.         Thought Content: Thought content normal.         Judgment: Judgment normal.            Significant Labs: BMP:   Recent Labs   Lab 09/16/24  0330   *   *   K 3.7   CL 93*   CO2 21*   BUN 76*   CREATININE 6.9*   CALCIUM 9.1    and CBC   Recent Labs   Lab 09/16/24  0330   WBC 8.50   HGB 9.5*   HCT 28.0*          Significant Imaging: Echocardiogram: Transthoracic echo (TTE) complete (Cupid Only):   Results for orders placed or performed during the hospital encounter of 09/13/24   Echo   Result Value Ref Range    BSA 1.95 m2    LA WIDTH 3.8 cm    Child's Biplane MOD Ejection Fraction 54 %    A2C EF 54 %    A4C EF 56 %    LVOT stroke volume 90.53 cm3    LVIDd 5.95 3.5 - 6.0 cm    LV Systolic Volume 93.64 mL    LV Systolic Volume Index 48.0 mL/m2    LVIDs 4.52 (A) 2.1 - 4.0 cm    LV ESV A2C 47.43 mL    LV Diastolic Volume 176.90 mL    LV ESV A4C 53.07 mL    LV Diastolic Volume Index 90.72 mL/m2    LV EDV A2C 125.358392984195611 mL    LV EDV A4C 99.84 mL    Left Ventricular End Systolic Volume by Teichholz Method 93.64 mL    Left Ventricular End Diastolic Volume by Teichholz Method 176.90 mL    IVS 1.13 (A) 0.6 - 1.1 cm    LVOT diameter 2.22 cm    LVOT area 3.9 cm2    FS 24 (A) 28 - 44 %    Left Ventricle Relative Wall Thickness 0.41 cm    Posterior Wall 1.23 (A) 0.6 - 1.1 cm    LV mass 302.79 g    LV Mass Index 155 g/m2    MV Peak E Jakob 1.03 m/s    TDI LATERAL 0.08 m/s    TDI SEPTAL 0.06 m/s    E/E' ratio 14.71 m/s    MV Peak A Jakob 0.77 m/s    TR Max Jakob 1.37 m/s    E/A ratio 1.34     E wave deceleration time 187.08 msec    LV SEPTAL E/E' RATIO 17.17 m/s    LA Volume Index 45.2 mL/m2    LV LATERAL E/E' RATIO 12.88 m/s    LA volume 88.12 cm3    PV Peak S Jakob 0.53 m/s    PV Peak D Jakob 0.41 m/s    Pulm vein S/D ratio 1.29     LVOT peak jakob 1.17 m/s    Left Ventricular  Outflow Tract Mean Velocity 0.83 cm/s    Left Ventricular Outflow Tract Mean Gradient 3.04 mmHg    RV- kang basal diam 3.8 cm    RV S' 13.33 cm/s    TAPSE 2.09 cm    LA size 3.96 cm    Left Atrium Minor Axis 6.82 cm    Left Atrium Major Axis 6.96 cm    LA volume (mod) 53.57 cm3    LA Volume Index (Mod) 27.5 mL/m2    RA Major Axis 5.33 cm    RA Width 3.96 cm    AV mean gradient 5 mmHg    AV peak gradient 9 mmHg    Ao peak nabil 1.48 m/s    Ao VTI 27.80 cm    LVOT peak VTI 23.40 cm    AV valve area 3.26 cm²    AV Velocity Ratio 0.79     AV index (prosthetic) 0.84     JILL by Velocity Ratio 3.06 cm²    Mr max nabil 4.96 m/s    MV mean gradient 2 mmHg    MV peak gradient 6 mmHg    MV valve area by continuity eq 2.31 cm2    MV VTI 39.2 cm    Triscuspid Valve Regurgitation Peak Gradient 8 mmHg    PV PEAK VELOCITY 1.14 m/s    PV peak gradient 5 mmHg    Pulmonary Valve Mean Velocity 0.81 m/s    Sinus 3.78 cm    STJ 2.60 cm    Ascending aorta 3.36 cm    Mean e' 0.07 m/s    ZLVIDS 2.21     ZLVIDD 0.68     LA area A4C 22.09 cm2    LA area A2C 19.29 cm2    TV resting pulmonary artery pressure 11 mmHg    RV TB RVSP 4 mmHg    Est. RA pres 3 mmHg    Narrative      Left Ventricle: The left ventricle is mildly dilated. There is   eccentric hypertrophy. Regional wall motion abnormalities present. See   diagram for wall motion findings. Septal motion is consistent with bundle   branch block. There is normal systolic function. Biplane (2D) method of   discs ejection fraction is 54%. Grade II diastolic dysfunction.    Right Ventricle: Normal right ventricular cavity size. Systolic   function is normal. TAPSE is 2.09 cm.    Left Atrium: Left atrium is moderately dilated. The left atrium volume   index is 45.2 mL/m2.    Right Atrium: Right atrium is mildly dilated.    Mitral Valve: There is mild regurgitation.    Pulmonic Valve: There is mild regurgitation.    Pulmonary Artery: The estimated pulmonary artery systolic pressure is   11 mmHg.     IVC/SVC: Normal venous pressure at 3 mmHg.       Assessment and Plan:         Paroxysmal atrial fibrillation  - continue BB and Eliquis     Chest pain  - resolved  - denies any chest pain     Coronary artery disease  - previous PCI in June  - [facility] yesterday with patent LAD and RCA stents; s/p PTCA and lithotripsy to LCX and OM- presented with SOB felt to be anginal equivalent  - reports symptoms resolved since admission  - originally recommend triple therapy for one month but discussed this AM with Dr. Salazar with recommendations for Plavix and Eliquis only given bleeding risk         HTN (hypertension)  - SBP 100s-110s  - on Norvasc, Inspra, Hydralazine, Coreg, Imdur as an outpatient  - continued on Coreg, Imdur and Hydralazine while admitted  - goal BP less than 130/80  - anticipate resumption of home medication regimen upon discharge           VTE Risk Mitigation (From admission, onward)           Ordered     heparin (porcine) injection  As needed (PRN)         09/16/24 1302     heparin infusion 1,000 units/500 ml in 0.9% NaCl (pressure line flush)  Intra-op continuous PRN         09/16/24 1238     IP VTE HIGH RISK PATIENT  Once         09/13/24 2248     Place sequential compression device  Until discontinued         09/13/24 2248                    ABO Ignacio, ANP  Cardiology  [place] - Telemetry

## 2024-09-17 NOTE — BRIEF OP NOTE
[place] [facility]  Surgery Department  Operative Note    SUMMARY   POST CATH NOTE    Date of Procedure: 9/16/2024     Procedure: Procedure(s) (LRB):  ANGIOGRAM, CORONARY ARTERY (N/A)  Left heart cath (Left)  PTCA, Single Vessel (Left)  LITHOTRIPSY, CORONARY TRANSLUMINAL, PERCUTANEOUS     Surgeons and Role:     * Enoch Tirado MD - Primary    Assisting Surgeon: None    Pre-Operative Diagnosis: Atherosclerosis of native coronary artery of native heart with unstable angina pectoris [I25.110]    Post-Operative Diagnosis: Post-Op Diagnosis Codes:     * Atherosclerosis of native coronary artery of native heart with unstable angina pectoris [I25.110]    s/p catheterization secondary to:     Cath Results:  Access: Us guided LCFA    Coronary Anatomy:     Left Main Coronary Artery: The left main is a large caliber vessel arising normally from the left sinus of valsalva.  It bifurcates into the left anterior descending and left circumflex coronary artery.  It is free of evidence of obstructive coronary artery disease. YADIRA III flow     Left Anterior Descending: The left anterior descending coronary artery is a large caliber vessel arising normally from the left main and extending to the apex.  It gives rise to small to moderate caliber diagonal arteries.  Stent are patent with minimal ISR. YADIRA III flow     Left Circumflex: The left circumflex is a large caliber vessel arising normally from the left main coronary artery, it is non-dominant.  It gives rise to moderate caliber obtuse marginal branches.  Ostium to prox stent into true Lcx 99% occluded and OM stent is completely occluded with microchannels. Right to left collaterals appreciated. YADIRA II flow.      Right Coronary Artery: The right coronary artery is a large caliber vessel arising normally from the right sinus of valsalva.  It is dominant giving rise to a PDA and PLV branch. Stents patent with mild ISR, distal RCA and PDA with 70-80% stenosis but are small  "vessels <2.0mm. YADIRA III flow    LVgram: LVEDP 5=15 mmHG    Intervention:   PCI procedure:  Heparin administered. ACT was closely monitored. Used a 7Fr x 45 cm destination.   Using a EBU 3.5guider, this was used to cannulate the left/right system.  Torey blue, prowater and runthrough PCI wire repshaped outside the body.  Advanced torey blue to Lcx, runthrough to OM and prowater to LAD. After advanced a a 2.5 NC to OM and inflated multiple times, a fresh 2.5 mm NC to Lcx and inflated multiple time improving flow, followed by KBI. IVUS advanced, decided to advanced a 2.5 angiosculpt, followed by IVL/schockwave 2.5 mm delivering all the therapies to stent with good results. IVUS advanced and decided to advanced a 3.0 Nc and inflated multiple time with great results. After the balloon was removed.  The wire was left in place.  Repeat angiography showed no signs of dissection.  Subsequently the wire was pulled back. Final angiography showed YADIRA-3 flow.  No signs of dissection, perforation, or thrombus.     Closure device: Manual pressure   Patient tolerated procedure well, no complications    Post Cath Exam:  BP (!) 115/58 (BP Location: Right leg, Patient Position: Lying)   Pulse (!) 58   Temp 97.5 °F (36.4 °C) (Temporal)   Resp 18   Ht 5' 10" (1.778 m)   Wt 70 kg (154 lb 5.2 oz)   SpO2 98%   BMI 22.14 kg/m²     Anesthesia: RN IV Sedation  Assessment:   PTCA and intravascular lithotripsy of Lcx and OM with excellent results   Patent stents in RCA and LAD    Plan:   Continue eliquis and plavix.   Optimized medical regimen  HD post cath  Will follow up as an outpatient.   "

## 2024-09-17 NOTE — SUBJECTIVE & OBJECTIVE
Review of Systems   Constitutional: Negative for chills, decreased appetite, diaphoresis, fever, malaise/fatigue, weight gain and weight loss.   Cardiovascular:  Negative for chest pain, claudication, dyspnea on exertion, irregular heartbeat, leg swelling, near-syncope, orthopnea, palpitations and paroxysmal nocturnal dyspnea.   Respiratory:  Negative for cough, shortness of breath, snoring, sputum production and wheezing.    Endocrine: Negative for cold intolerance, heat intolerance, polydipsia, polyphagia and polyuria.   Skin:  Negative for color change, dry skin, itching, nail changes and poor wound healing.   Musculoskeletal:  Negative for back pain, gout, joint pain and joint swelling.   Gastrointestinal:  Negative for bloating, abdominal pain, constipation, diarrhea, hematemesis, hematochezia, melena, nausea and vomiting.   Genitourinary:  Negative for dysuria, hematuria and nocturia.   Neurological:  Negative for dizziness, headaches, light-headedness, numbness, paresthesias and weakness.   Psychiatric/Behavioral:  Negative for altered mental status, depression and memory loss.      Objective:     Vital Signs (Most Recent):  Temp: 98 °F (36.7 °C) (09/17/24 0846)  Pulse: 60 (09/17/24 0855)  Resp: 18 (09/17/24 0846)  BP: 132/68 (09/17/24 0855)  SpO2: 97 % (09/17/24 0802) Vital Signs (24h Range):  Temp:  [96.5 °F (35.8 °C)-98.2 °F (36.8 °C)] 98 °F (36.7 °C)  Pulse:  [56-95] 60  Resp:  [15-23] 18  SpO2:  [95 %-100 %] 97 %  BP: ()/(20-76) 132/68     Weight: 70 kg (154 lb 5.2 oz)  Body mass index is 22.14 kg/m².     SpO2: 97 %         Intake/Output Summary (Last 24 hours) at 9/17/2024 1021  Last data filed at 9/17/2024 0425  Gross per 24 hour   Intake 120 ml   Output 900 ml   Net -780 ml       Lines/Drains/Airways       Peripheral Intravenous Line  Duration                  Hemodialysis AV Fistula Left upper arm -- days         Peripheral IV - Single Lumen 09/13/24 1513 20 G Right Antecubital 3 days          Peripheral IV - Single Lumen 09/14/24 2200 20 G Anterior;Right Upper Arm 2 days                       Physical Exam  Constitutional:       General: He is not in acute distress.     Appearance: He is well-developed.   Neck:      Vascular: No JVD.   Cardiovascular:      Rate and Rhythm: Normal rate and regular rhythm.      Heart sounds: No murmur heard.     No gallop.   Pulmonary:      Effort: Pulmonary effort is normal. No respiratory distress.      Breath sounds: Normal breath sounds. No wheezing.   Abdominal:      General: Bowel sounds are normal. There is no distension.      Palpations: Abdomen is soft.      Tenderness: There is no abdominal tenderness.   Musculoskeletal:      Cervical back: Normal range of motion and neck supple.   Skin:     General: Skin is warm and dry.   Neurological:      Mental Status: He is alert.      Comments: Disoriented    Psychiatric:         Behavior: Behavior normal.         Thought Content: Thought content normal.         Judgment: Judgment normal.            Significant Labs: BMP:   Recent Labs   Lab 09/16/24  0330   *   *   K 3.7   CL 93*   CO2 21*   BUN 76*   CREATININE 6.9*   CALCIUM 9.1    and CBC   Recent Labs   Lab 09/16/24  0330   WBC 8.50   HGB 9.5*   HCT 28.0*          Significant Imaging: Echocardiogram: Transthoracic echo (TTE) complete (Cupid Only):   Results for orders placed or performed during the hospital encounter of 09/13/24   Echo   Result Value Ref Range    BSA 1.95 m2    LA WIDTH 3.8 cm    Child's Biplane MOD Ejection Fraction 54 %    A2C EF 54 %    A4C EF 56 %    LVOT stroke volume 90.53 cm3    LVIDd 5.95 3.5 - 6.0 cm    LV Systolic Volume 93.64 mL    LV Systolic Volume Index 48.0 mL/m2    LVIDs 4.52 (A) 2.1 - 4.0 cm    LV ESV A2C 47.43 mL    LV Diastolic Volume 176.90 mL    LV ESV A4C 53.07 mL    LV Diastolic Volume Index 90.72 mL/m2    LV EDV A2C 125.102974986238185 mL    LV EDV A4C 99.84 mL    Left Ventricular End Systolic Volume by  Teichholz Method 93.64 mL    Left Ventricular End Diastolic Volume by Teichholz Method 176.90 mL    IVS 1.13 (A) 0.6 - 1.1 cm    LVOT diameter 2.22 cm    LVOT area 3.9 cm2    FS 24 (A) 28 - 44 %    Left Ventricle Relative Wall Thickness 0.41 cm    Posterior Wall 1.23 (A) 0.6 - 1.1 cm    LV mass 302.79 g    LV Mass Index 155 g/m2    MV Peak E Jakob 1.03 m/s    TDI LATERAL 0.08 m/s    TDI SEPTAL 0.06 m/s    E/E' ratio 14.71 m/s    MV Peak A Jakob 0.77 m/s    TR Max Jakob 1.37 m/s    E/A ratio 1.34     E wave deceleration time 187.08 msec    LV SEPTAL E/E' RATIO 17.17 m/s    LA Volume Index 45.2 mL/m2    LV LATERAL E/E' RATIO 12.88 m/s    LA volume 88.12 cm3    PV Peak S Jakob 0.53 m/s    PV Peak D Jakob 0.41 m/s    Pulm vein S/D ratio 1.29     LVOT peak jakob 1.17 m/s    Left Ventricular Outflow Tract Mean Velocity 0.83 cm/s    Left Ventricular Outflow Tract Mean Gradient 3.04 mmHg    RV- kang basal diam 3.8 cm    RV S' 13.33 cm/s    TAPSE 2.09 cm    LA size 3.96 cm    Left Atrium Minor Axis 6.82 cm    Left Atrium Major Axis 6.96 cm    LA volume (mod) 53.57 cm3    LA Volume Index (Mod) 27.5 mL/m2    RA Major Axis 5.33 cm    RA Width 3.96 cm    AV mean gradient 5 mmHg    AV peak gradient 9 mmHg    Ao peak jakob 1.48 m/s    Ao VTI 27.80 cm    LVOT peak VTI 23.40 cm    AV valve area 3.26 cm²    AV Velocity Ratio 0.79     AV index (prosthetic) 0.84     JILL by Velocity Ratio 3.06 cm²    Mr max jakob 4.96 m/s    MV mean gradient 2 mmHg    MV peak gradient 6 mmHg    MV valve area by continuity eq 2.31 cm2    MV VTI 39.2 cm    Triscuspid Valve Regurgitation Peak Gradient 8 mmHg    PV PEAK VELOCITY 1.14 m/s    PV peak gradient 5 mmHg    Pulmonary Valve Mean Velocity 0.81 m/s    Sinus 3.78 cm    STJ 2.60 cm    Ascending aorta 3.36 cm    Mean e' 0.07 m/s    ZLVIDS 2.21     ZLVIDD 0.68     LA area A4C 22.09 cm2    LA area A2C 19.29 cm2    TV resting pulmonary artery pressure 11 mmHg    RV TB RVSP 4 mmHg    Est. RA pres 3 mmHg    Narrative       Left Ventricle: The left ventricle is mildly dilated. There is   eccentric hypertrophy. Regional wall motion abnormalities present. See   diagram for wall motion findings. Septal motion is consistent with bundle   branch block. There is normal systolic function. Biplane (2D) method of   discs ejection fraction is 54%. Grade II diastolic dysfunction.    Right Ventricle: Normal right ventricular cavity size. Systolic   function is normal. TAPSE is 2.09 cm.    Left Atrium: Left atrium is moderately dilated. The left atrium volume   index is 45.2 mL/m2.    Right Atrium: Right atrium is mildly dilated.    Mitral Valve: There is mild regurgitation.    Pulmonic Valve: There is mild regurgitation.    Pulmonary Artery: The estimated pulmonary artery systolic pressure is   11 mmHg.    IVC/SVC: Normal venous pressure at 3 mmHg.

## 2024-09-17 NOTE — PLAN OF CARE
09/17/24 1100   Rounds   Attendance Provider;Nurse    Discharge Plan A Home with family       1100  CM was informed by Dr Zambrano that the pt is medically stable to discharge home today.     1210  Patient resting quietly in the HD unit when CM rounded. No family present. HD session recently completed. Patient in agreement with plan to discharge home today & denied the need for assistance with transportation at time of discharge.    1340  Future Appointments   Date Time Provider Department Center   9/25/2024  2:20 PM Enoch Tirado MD [facility] CARDIO [facility]   9/27/2024  9:30 AM Adelaide Puga MD [facility] IMPRI [place] [facility]   12/16/2024  1:40 PM Perez Bell,  [facility]     Information added to the pt's discharge paperwork.     1430  DC order noted. Message sent to nurse Valentine, charge nurse Marlena, & virtual nurse Kim informing that the pt is cleared to discharge.       Will continue to follow.

## 2024-09-17 NOTE — NURSING
VN cued into room and permission is given to adjust the camera towards the patient who is dressed, sitting up in bed and in no apparent distress.  AVS reviewed with patient and patient verbalized complete understanding on the discharge instructions.  Patient received prescriptions for duonebs and Lasix.  Wheelchair per Transport placed.  Voiced no other concerns.

## 2024-09-17 NOTE — ASSESSMENT & PLAN NOTE
- SBP 100s-110s  - on Norvasc, Inspra, Hydralazine, Coreg, Imdur as an outpatient  - continued on Coreg, Imdur and Hydralazine while admitted  - goal BP less than 130/80  - anticipate resumption of home medication regimen upon discharge

## 2024-09-18 ENCOUNTER — PATIENT MESSAGE (OUTPATIENT)
Dept: ADMINISTRATIVE | Facility: CLINIC | Age: 64
End: 2024-09-18
Payer: COMMERCIAL

## 2024-09-18 ENCOUNTER — PATIENT OUTREACH (OUTPATIENT)
Dept: ADMINISTRATIVE | Facility: CLINIC | Age: 64
End: 2024-09-18
Payer: COMMERCIAL

## 2024-09-18 NOTE — PROGRESS NOTES
C3 nurse attempted to contact Domingo Gross for a TCC post hospital discharge follow up call. No answer. No voicemail available. The patient has a scheduled HOSFU appointment with Enoch Salazar MD (cardiology) on 9/25/24 @ 9:30am, and Adelaide Puga MD (PCP) on 9/27/24 @ 9:30am.

## 2024-09-18 NOTE — PROGRESS NOTES
2nd attempt-C3 nurse attempted to contact Domingo Gross for a TCC post hospital discharge follow up call. No answer. No voicemail available. The patient has a scheduled HOSFU appointment with Enoch Salazar MD (cardiology) on 9/25/24 @ 9:30am, and Adelaide Puga MD (PCP) on 9/27/24 @ 9:30am.

## 2024-09-18 NOTE — PLAN OF CARE
[facility] - Telemetry  Discharge Final Note    Primary Care Provider: Perez Bell DO    Expected Discharge Date: 9/17/2024    Final Discharge Note (most recent)       Final Note - 09/18/24 0759          Final Note    Assessment Type Final Discharge Note (P)      Anticipated Discharge Disposition Home or Self Care (P)      Hospital Resources/Appts/Education Provided Appointments scheduled and added to AVS (P)                        Contact Info       Enoch Tirado MD   Specialty: Cardiology, Interventional Cardiology    [address]  [address]  [place] [address]   Phone: [phone]       Next Steps: Follow up on 9/25/2024    Instructions: at 9:30 AM; cardiology hospital follow up appointment    Adelaide Puga MD   Specialty: Internal Medicine    [address]  [address]  [place] [address]   Phone: [phone]       Next Steps: Follow up on 9/27/2024    Instructions: at 9:30 AM; hospital follow up appointment in the [facility]

## 2024-09-19 LAB
OHS QRS DURATION: 150 MS
OHS QTC CALCULATION: 564 MS

## 2024-09-19 NOTE — PROGRESS NOTES
3rd attempt-C3 nurse attempted to contact Domingo Gross for a TCC post hospital discharge follow up call. No answer. No voicemail available. The patient has a scheduled HOSFU appointment with Enoch Salazar MD (cardiology) on 9/25/24 @ 9:30am, and Adelaide Puga MD (PCP) on 9/27/24 @ 9:30am.

## 2024-09-21 NOTE — PLAN OF CARE
[facility] - Telemetry  Discharge Final Note    Primary Care Provider: Perez Bell DO    Expected Discharge Date: 9/10/2024    Final Discharge Note (most recent)       Final Note - 09/21/24 0726          Final Note    Assessment Type Final Discharge Note (P)      Anticipated Discharge Disposition Home or Self Care (P)      Hospital Resources/Appts/Education Provided Appointments scheduled and added to AVS (P)                        Contact Info       Perez Bell DO   Specialty: Family Medicine   Relationship: PCP - General    [address] [facility]  [place] [address]   Phone: [phone]       Next Steps: Schedule an appointment as soon as possible for a visit in 1 week(s)    Instructions: Patient will be notified of a PCP hospital follow up appointment.    Adelaide Puga MD   Specialty: Internal Medicine    [address] [facility]  [address]  [place] [address]   Phone: [phone]       Next Steps: Follow up on 9/16/2024    Instructions: at 10:30 AM; hosplital follow up appointment in the [facility]

## 2024-09-22 NOTE — PROGRESS NOTES
Subjective:   @Patient ID:  Domingo Gross is a 64 y.o. male who presents for follow-up of No chief complaint on file.      HPI:   Mr. Gross is a pleasant 64 yo gentleman with hx of HTN, CKD IV, PAD with multiple intervention, CAD (mid LAD/prox RCA PCI 3/2019 and prox LCA in 10/2019 following out of hospital cardiac arrest), cardiac arrest in setting of prox LCX occlusion treated with PCI with normal LV function.     3/2023: Mr. Gross returned for follow-up. Recently spoke with him on the phone regarding confusion on whether he was on eliquis or not. His wife confirmed he was on eliquis and aspirin. He has been having angina. Dr. Jenkins has a stress test ordered. He was initiated on Imdur. His Hgb has been stable (9-10).     5/2023: Underwent LCX PCI with Dr. Jenkins.     6/2023: Underwent JOSE/DCCV with Dr. Jenkins. Initiated on amiodarone.    03/01/24: Mr Gross reports chest pain similar to his previous events where he required stents.     7/10/24: Recently admitted with CP s/p Cath-angioplasty LCX-OM with KBI with acceptable results. Symptoms improved. Reports no chest pain.     9/25/24  Post hospital follow up. Currently asymptomatic. He presented to the ER with his typical symptoms of SOB (equivalent) s/p intervention w cutting/shockwave PTCA to Lcx with great results. Today he voiced no CV complaints.       Patient Active Problem List    Diagnosis Date Noted    Shortness of breath 09/14/2024    S/P drug eluting coronary stent placement 09/14/2024    Atherosclerosis of native coronary artery of native heart with unstable angina pectoris 09/14/2024    Acute congestive heart failure 09/13/2024    Pulmonary edema 09/09/2024    Anticoagulated 07/01/2024    NSTEMI (non-ST elevated myocardial infarction) 06/25/2024    Hemodialysis catheter dysfunction 05/16/2024    S/P arteriovenous (AV) graft placement 04/23/2024    Membranous nephropathy determined by biopsy 02/20/2024    Closed fracture of transverse  process of lumbar vertebra 02/16/2024    ESRD (end stage renal disease) on dialysis 02/16/2024    Fall 02/16/2024    EBV infection 01/31/2024    CMV (cytomegalovirus infection) 01/31/2024    Paroxysmal atrial fibrillation 08/24/2023    Hypothyroidism 03/02/2023    Unstable angina 03/02/2023     I have messaged his cardiologist and electrophysiologist about this. Currently awaiting response       Chest pain 11/12/2022    Persistent atrial fibrillation 11/12/2022    Anemia due to chronic kidney disease, on chronic dialysis 12/15/2021    COPD exacerbation 08/20/2021    Nuclear sclerosis, bilateral 06/08/2020    Nephrotic range proteinuria 04/06/2020    Cardiac arrest 10/04/2019    Bilateral carotid artery stenosis     Coronary artery disease 03/29/2019         3/29/2019    S/p LHC via R radial           LM, LAD, and LCX are patent with luminal irregularities  RCA mid 95% calcified lesion  Normal EF with LVEDP 8 mmHg           PCI of mid LAD with 2.5 x 30 and 2.5 x 15 mm Resolute REZA  PCI of proximal RCA to treat guide dissection with 3.5 x 22 Resolute REZA        10/11/2019 for cardiac arrest        S/p staged LCX PCI                    L CFA access              IVUS guided PCI              3.0 x 15 mm Resolute REZA post dilated with 3.5 NC balloon           Abnormal cardiovascular stress test 02/27/2019         Nuclear stress test 2/2019     + ECG with 2 mm ST depression   No wall motion abnormalities   Test stopped at 6 minutes, 7 METs, 86% predicted heart rate   Stopped because of R leg claudication + ECG changes            Venous insufficiency of both lower extremities 06/04/2018    Atherosclerosis of native artery of both lower extremities with intermittent claudication 03/02/2018    Hyperlipidemia 06/12/2015    DJD (degenerative joint disease), lumbar 05/27/2015    Claudication in peripheral vascular disease 06/13/2014    PAD (peripheral artery disease) 05/21/2014 6/13/2014      1. Three vessel runoff below the  knee bilaterally.  2. Severe disease of the terminal aorta.  3. Successful PTAS.  4. Bilateral common iliac reconstruction with 8 x 39 mm stent extending in the aorta  5. Right common illiac was treated with an overlapping 9 x 60 mm SES   6. Left common iliac was treated with an overlapping 8 x 80 mm SES down to external iliac  7. All stents were post dilated with 9.0 x 60 mm balloons  8. Moderate bilateral SFA disease  9. Chronically occluded left internal iliac artery        6/12/2015      1. 80% mid left SFA with a 20 mmHg resting mean gradient  2. Atherectomy with 2.2 Mob Sciencenix Volcano catheter  3. PTA with 6.0 x 100 mm Lutonix drug coated balloon        6/9/2017      Patent bilateral GRUPO/EIA stents  L EIA PTAS 9.0 x 80 mm Life stent post dilated with 8.0 mm balloon  Bilateral 70-80% SFA stenosis with 3 vessel run off          3/2018      L SFA intervention for claudication   75% L SFA with 3 vessel run off   7 mmHg resting and 33 mmHg hyperemic mean gradient         L CFA antegrade access   PTA with 6.0 x 150 mm   PTA with 6.0 x 150 mm Lutonix   3 vessel run off           4/13/2018       S/p R SFA PTA for winsome IIb claudication   R CFA antegrade access         R CFA with 50% stenosis-heavily calcified lesion               Baseline /90         R SFA with 70% stenosis with a significant resting and hyperemic mean gradient     3 vessel run off             PTA of R SFA with 6.0 x 150 + 6.0 x 60 mm Lutonix DCB        5/2/2018   Patent arteries post revascularization        2/2019     R 0.84 to 0.27   L 0.90 to 0.66   After ambulation   Severe R calf claudication        Peripheral angiogram 5/10/2019                   95% R CFA stenosis; heavily calcified    Patent SFA, POP + 3 vessel run off      Peripheral intervention 7/12/2019    S/p right CFA revascularization for winosme IIb claudication       Assisted JOSY approach   L radial access for visualization   5-6 slender R PT access for delivery of  "therapy          Atherectomy with SilverHawk catheter   PTA with 7.0 x 40 mm Lutonix DCB            HTN (hypertension) 04/29/2013                    LABS  CBC  @LABRCNTIP(WBC:3,RBC:3,HGB:3,HCT:3,PLT:3,MCV:3,MCH:3,MCHC:3)@  BMP  @LABRCNTIP(NA:3,K:3,CO2:3,CL:3,BUN:3,Creatinine:3,GLU:3,GFR:3)@    POCT-Glucose  No results found for: "POCTGLUCOSE"    @LABRCNTIP(Calcium:3,MG:3,PHOS:3)@  LFT  @LABRCNTIP(PROT:3,Albumin:3,,Bilitot:3,AST:3,Alkphos:3,ALT:3)@  GFR     COAGS  @LABRCNTIP(PT:3,INR:3,APTT:3)@  CE  @LABRCNTIP(troponini:3,cktotal:3,ckmb:3)@  ABGs  @[id number](PH,PCO2,PO2,HCO3,POCSATURATED,BE)@  BNP  @LABRCNTIP(BNP:3)@    LAST HbA1c  Lab Results   Component Value Date    HGBA1C 4.6 06/25/2024       Lipid panel  Lab Results   Component Value Date    CHOL 129 06/25/2024    CHOL 132 06/06/2024    CHOL 144 08/30/2023     Lab Results   Component Value Date    HDL 63 06/25/2024    HDL 58 06/06/2024    HDL 53 08/30/2023     Lab Results   Component Value Date    LDLCALC 55.2 (L) 06/25/2024    LDLCALC 56.0 (L) 06/06/2024    LDLCALC 63.8 08/30/2023     Lab Results   Component Value Date    TRIG 54 06/25/2024    TRIG 90 06/06/2024    TRIG 136 08/30/2023     Lab Results   Component Value Date    CHOLHDL 48.8 06/25/2024    CHOLHDL 43.9 06/06/2024    CHOLHDL 36.8 08/30/2023            Review of Systems   Cardiovascular:  Negative for chest pain.   All other systems reviewed and are negative.      Objective:   General: No acute stress  HENT: EOM intact, PERRL, oropharynx clear, mucous membranes clear and moist   Pulmonary: CTAB  Cardiovascular: regular rhythm, nl S1/S2, no murmurs, rubs or gallops  Abdomen: soft, non-tender, no distended no splenomegaly or hepatomegaly   Skin: warm, dry, no erythema, no rashes    Extremities: periph pulses intact, no cyanosis or edema   Neuro: a/ox4, clear speech, follows commands, no weakness or focal neurologic  deficit   Psych: mood and behavior normal       Assessment:     1. Hyperlipidemia, " unspecified hyperlipidemia type    2. Primary hypertension    3. Persistent atrial fibrillation [I48.19]    4. Atherosclerosis of native coronary artery of native heart without angina pectoris    5. Essential hypertension    6. Chest pain, unspecified type    7. Mixed hyperlipidemia    8. Congestive heart failure, unspecified HF chronicity, unspecified heart failure type    9. S/P arteriovenous (AV) graft placement    10. PAD (peripheral artery disease)    11. ESRD (end stage renal disease) on dialysis    12. Bilateral carotid artery stenosis    13. Cardiac arrest    14. S/P drug eluting coronary stent placement            Plan:     CAD with multiple interventions - asymptomatic. Now s/p cath with angioplasty with cutting balloon/ IVL of Cx and OM ISR with great results. Optimized anti anginals. Continue Plavix + eliquis.   HTN- continue current medication. Will uptitrate as tolerated.   Afib per EP- continue eliquis  AA- continue statin  PAD -currently asymptomatic. Continue plavix and statin.   BL BRITT- continue statin  ESRD on HD    Continue with current medical plan and lifestyle changes.  Return sooner for concerns or questions. If symptoms persist go to the ED  I have reviewed all pertinent data on this patient       I have reviewed the patient's medical history in detail and updated the computerized patient record.    No orders of the defined types were placed in this encounter.      Follow up as scheduled. Return sooner for concerns or questions            He expressed verbal understanding and agreed with the plan        Patient's Medications   New Prescriptions    No medications on file   Previous Medications    ALBUTEROL-IPRATROPIUM (DUO-NEB) 2.5 MG-0.5 MG/3 ML NEBULIZER SOLUTION    Use 1 vial by nebulization every 6 (six) hours as needed for Wheezing or Shortness of Breath (or cough). Rescue    HYDROXYZINE PAMOATE (VISTARIL) 25 MG CAP    TAKE 1 CAPSULE(25 MG) BY MOUTH EVERY NIGHT AS NEEDED FOR INSOMNIA OR  ANXIETY    LEVOTHYROXINE (SYNTHROID) 75 MCG TABLET    Take 1 tablet (75 mcg total) by mouth before breakfast.    NITROGLYCERIN (NITROSTAT) 0.4 MG SL TABLET    Place 1 tablet (0.4 mg total) under the tongue every 5 (five) minutes as needed for Chest pain (for a max of 3 tabs in 15 minutes).   Modified Medications    Modified Medication Previous Medication    AMLODIPINE (NORVASC) 5 MG TABLET amLODIPine (NORVASC) 5 MG tablet       Take 1 tablet (5 mg total) by mouth once daily.    Take 1 tablet (5 mg total) by mouth once daily.    APIXABAN (ELIQUIS) 5 MG TAB apixaban (ELIQUIS) 5 mg Tab       Take 1 tablet (5 mg total) by mouth 2 (two) times daily.    Take 1 tablet (5 mg total) by mouth 2 (two) times daily.    CARVEDILOL (COREG) 25 MG TABLET carvediloL (COREG) 25 MG tablet       Take 1 tablet (25 mg total) by mouth 2 (two) times daily with meals.    Take 1 tablet (25 mg total) by mouth 2 (two) times daily with meals.    CLOPIDOGREL (PLAVIX) 75 MG TABLET clopidogreL (PLAVIX) 75 mg tablet       Take 1 tablet (75 mg total) by mouth once daily.    Take 1 tablet (75 mg total) by mouth once daily.    COENZYME Q10 100 MG CAPSULE coenzyme Q10 100 mg capsule       Take 1 capsule (100 mg total) by mouth once daily.    Take 100 mg by mouth once daily.    EPLERENONE (INSPRA) 50 MG TAB eplerenone (INSPRA) 50 MG Tab       Take 1 tablet (50 mg total) by mouth once daily.    Take 1 tablet (50 mg total) by mouth once daily.    FUROSEMIDE (LASIX) 80 MG TABLET furosemide (LASIX) 80 MG tablet       Take 1 tablet (80 mg total) by mouth every other day. Non dialysis days only   Monday, Wednesday, Friday , Sunday    Take 1 tablet (80 mg total) by mouth every other day. Non dialysis days only   Monday, Wednesday, Friday , Sunday    HYDRALAZINE (APRESOLINE) 50 MG TABLET hydrALAZINE (APRESOLINE) 50 MG tablet       Take 1 tablet (50 mg total) by mouth every 8 (eight) hours.    Take 1 tablet (50 mg total) by mouth every 8 (eight) hours.     ISOSORBIDE MONONITRATE (IMDUR) 30 MG 24 HR TABLET isosorbide mononitrate (IMDUR) 30 MG 24 hr tablet       Take 1 tablet (30 mg total) by mouth once daily.    Take 1 tablet (30 mg total) by mouth once daily.    RANOLAZINE (RANEXA) 1,000 MG TB12 ranolazine (RANEXA) 1,000 mg Tb12       Take 1 tablet (1,000 mg total) by mouth 2 (two) times daily.    Take 1 tablet (1,000 mg total) by mouth 2 (two) times daily.    ROSUVASTATIN (CRESTOR) 40 MG TAB rosuvastatin (CRESTOR) 40 MG Tab       Take 1 tablet (40 mg total) by mouth every evening.    Take 1 tablet (40 mg total) by mouth every evening.   Discontinued Medications    No medications on file        Enoch Salazar MD  Cardiovascular Disease [facility]

## 2024-09-25 ENCOUNTER — OFFICE VISIT (OUTPATIENT)
Dept: CARDIOLOGY | Facility: CLINIC | Age: 64
End: 2024-09-25
Payer: COMMERCIAL

## 2024-09-25 VITALS
WEIGHT: 172 LBS | HEART RATE: 82 BPM | BODY MASS INDEX: 24.62 KG/M2 | DIASTOLIC BLOOD PRESSURE: 62 MMHG | SYSTOLIC BLOOD PRESSURE: 112 MMHG | HEIGHT: 70 IN

## 2024-09-25 DIAGNOSIS — I48.19 PERSISTENT ATRIAL FIBRILLATION: ICD-10-CM

## 2024-09-25 DIAGNOSIS — I25.10 ATHEROSCLEROSIS OF NATIVE CORONARY ARTERY OF NATIVE HEART WITHOUT ANGINA PECTORIS: ICD-10-CM

## 2024-09-25 DIAGNOSIS — N18.6 ESRD (END STAGE RENAL DISEASE) ON DIALYSIS: ICD-10-CM

## 2024-09-25 DIAGNOSIS — E78.2 MIXED HYPERLIPIDEMIA: ICD-10-CM

## 2024-09-25 DIAGNOSIS — Z99.2 ESRD (END STAGE RENAL DISEASE) ON DIALYSIS: ICD-10-CM

## 2024-09-25 DIAGNOSIS — I73.9 PAD (PERIPHERAL ARTERY DISEASE): ICD-10-CM

## 2024-09-25 DIAGNOSIS — Z95.5 S/P DRUG ELUTING CORONARY STENT PLACEMENT: ICD-10-CM

## 2024-09-25 DIAGNOSIS — I65.23 BILATERAL CAROTID ARTERY STENOSIS: ICD-10-CM

## 2024-09-25 DIAGNOSIS — R07.9 CHEST PAIN, UNSPECIFIED TYPE: ICD-10-CM

## 2024-09-25 DIAGNOSIS — I10 ESSENTIAL HYPERTENSION: ICD-10-CM

## 2024-09-25 DIAGNOSIS — Z95.828 S/P ARTERIOVENOUS (AV) GRAFT PLACEMENT: ICD-10-CM

## 2024-09-25 DIAGNOSIS — I50.9 CONGESTIVE HEART FAILURE, UNSPECIFIED HF CHRONICITY, UNSPECIFIED HEART FAILURE TYPE: ICD-10-CM

## 2024-09-25 DIAGNOSIS — I10 PRIMARY HYPERTENSION: ICD-10-CM

## 2024-09-25 DIAGNOSIS — I46.9 CARDIAC ARREST: ICD-10-CM

## 2024-09-25 DIAGNOSIS — E78.5 HYPERLIPIDEMIA, UNSPECIFIED HYPERLIPIDEMIA TYPE: Primary | ICD-10-CM

## 2024-09-25 PROCEDURE — 1111F DSCHRG MED/CURRENT MED MERGE: CPT | Mod: CPTII,S$GLB,, | Performed by: STUDENT IN AN ORGANIZED HEALTH CARE EDUCATION/TRAINING PROGRAM

## 2024-09-25 PROCEDURE — 3074F SYST BP LT 130 MM HG: CPT | Mod: CPTII,S$GLB,, | Performed by: STUDENT IN AN ORGANIZED HEALTH CARE EDUCATION/TRAINING PROGRAM

## 2024-09-25 PROCEDURE — 3062F POS MACROALBUMINURIA REV: CPT | Mod: CPTII,S$GLB,, | Performed by: STUDENT IN AN ORGANIZED HEALTH CARE EDUCATION/TRAINING PROGRAM

## 2024-09-25 PROCEDURE — G2211 COMPLEX E/M VISIT ADD ON: HCPCS | Mod: S$GLB,,, | Performed by: STUDENT IN AN ORGANIZED HEALTH CARE EDUCATION/TRAINING PROGRAM

## 2024-09-25 PROCEDURE — 3008F BODY MASS INDEX DOCD: CPT | Mod: CPTII,S$GLB,, | Performed by: STUDENT IN AN ORGANIZED HEALTH CARE EDUCATION/TRAINING PROGRAM

## 2024-09-25 PROCEDURE — 3066F NEPHROPATHY DOC TX: CPT | Mod: CPTII,S$GLB,, | Performed by: STUDENT IN AN ORGANIZED HEALTH CARE EDUCATION/TRAINING PROGRAM

## 2024-09-25 PROCEDURE — 1159F MED LIST DOCD IN RCRD: CPT | Mod: CPTII,S$GLB,, | Performed by: STUDENT IN AN ORGANIZED HEALTH CARE EDUCATION/TRAINING PROGRAM

## 2024-09-25 PROCEDURE — 3044F HG A1C LEVEL LT 7.0%: CPT | Mod: CPTII,S$GLB,, | Performed by: STUDENT IN AN ORGANIZED HEALTH CARE EDUCATION/TRAINING PROGRAM

## 2024-09-25 PROCEDURE — 99999 PR PBB SHADOW E&M-EST. PATIENT-LVL III: CPT | Mod: PBBFAC,,, | Performed by: STUDENT IN AN ORGANIZED HEALTH CARE EDUCATION/TRAINING PROGRAM

## 2024-09-25 PROCEDURE — 3078F DIAST BP <80 MM HG: CPT | Mod: CPTII,S$GLB,, | Performed by: STUDENT IN AN ORGANIZED HEALTH CARE EDUCATION/TRAINING PROGRAM

## 2024-09-25 PROCEDURE — 99215 OFFICE O/P EST HI 40 MIN: CPT | Mod: S$GLB,,, | Performed by: STUDENT IN AN ORGANIZED HEALTH CARE EDUCATION/TRAINING PROGRAM

## 2024-09-25 RX ORDER — ISOSORBIDE MONONITRATE 30 MG/1
30 TABLET, EXTENDED RELEASE ORAL DAILY
Qty: 90 TABLET | Refills: 3 | Status: SHIPPED | OUTPATIENT
Start: 2024-09-25 | End: 2025-09-25

## 2024-09-25 RX ORDER — CLOPIDOGREL BISULFATE 75 MG/1
75 TABLET ORAL DAILY
Qty: 90 TABLET | Refills: 3 | Status: SHIPPED | OUTPATIENT
Start: 2024-09-25

## 2024-09-25 RX ORDER — ROSUVASTATIN CALCIUM 40 MG/1
40 TABLET, COATED ORAL NIGHTLY
Qty: 90 TABLET | Refills: 3 | Status: SHIPPED | OUTPATIENT
Start: 2024-09-25

## 2024-09-25 RX ORDER — EPINEPHRINE 0.22MG
100 AEROSOL WITH ADAPTER (ML) INHALATION DAILY
Qty: 90 CAPSULE | Refills: 3 | Status: SHIPPED | OUTPATIENT
Start: 2024-09-25 | End: 2025-09-20

## 2024-09-25 RX ORDER — HYDRALAZINE HYDROCHLORIDE 50 MG/1
50 TABLET, FILM COATED ORAL EVERY 8 HOURS
Qty: 270 TABLET | Refills: 3 | Status: SHIPPED | OUTPATIENT
Start: 2024-09-25 | End: 2025-09-25

## 2024-09-25 RX ORDER — AMLODIPINE BESYLATE 5 MG/1
5 TABLET ORAL DAILY
Qty: 90 TABLET | Refills: 3 | Status: SHIPPED | OUTPATIENT
Start: 2024-09-25 | End: 2025-09-25

## 2024-09-25 RX ORDER — CARVEDILOL 25 MG/1
25 TABLET ORAL 2 TIMES DAILY WITH MEALS
Qty: 180 TABLET | Refills: 3 | Status: SHIPPED | OUTPATIENT
Start: 2024-09-25 | End: 2025-09-25

## 2024-09-25 RX ORDER — RANOLAZINE 1000 MG/1
1000 TABLET, EXTENDED RELEASE ORAL 2 TIMES DAILY
Qty: 180 TABLET | Refills: 3 | Status: SHIPPED | OUTPATIENT
Start: 2024-09-25 | End: 2025-09-25

## 2024-09-25 RX ORDER — FUROSEMIDE 80 MG/1
80 TABLET ORAL EVERY OTHER DAY
Qty: 15 TABLET | Refills: 11 | Status: SHIPPED | OUTPATIENT
Start: 2024-09-25 | End: 2025-09-25

## 2024-09-25 RX ORDER — EPLERENONE 50 MG/1
50 TABLET, FILM COATED ORAL DAILY
Qty: 90 TABLET | Refills: 3 | Status: SHIPPED | OUTPATIENT
Start: 2024-09-25

## 2024-09-27 ENCOUNTER — OFFICE VISIT (OUTPATIENT)
Dept: PRIMARY CARE CLINIC | Facility: CLINIC | Age: 64
End: 2024-09-27
Payer: COMMERCIAL

## 2024-09-27 VITALS
DIASTOLIC BLOOD PRESSURE: 54 MMHG | BODY MASS INDEX: 25.03 KG/M2 | HEART RATE: 68 BPM | SYSTOLIC BLOOD PRESSURE: 118 MMHG | OXYGEN SATURATION: 95 % | HEIGHT: 70 IN | WEIGHT: 174.81 LBS

## 2024-09-27 DIAGNOSIS — I48.0 PAROXYSMAL ATRIAL FIBRILLATION: ICD-10-CM

## 2024-09-27 DIAGNOSIS — Z99.2 ESRD (END STAGE RENAL DISEASE) ON DIALYSIS: ICD-10-CM

## 2024-09-27 DIAGNOSIS — Z79.01 ANTICOAGULATED: ICD-10-CM

## 2024-09-27 DIAGNOSIS — Z87.891 FORMER SMOKER: ICD-10-CM

## 2024-09-27 DIAGNOSIS — G47.9 RESTLESS SLEEPER: ICD-10-CM

## 2024-09-27 DIAGNOSIS — I25.118 CORONARY ARTERY DISEASE INVOLVING NATIVE HEART WITH OTHER FORM OF ANGINA PECTORIS, UNSPECIFIED VESSEL OR LESION TYPE: Primary | ICD-10-CM

## 2024-09-27 DIAGNOSIS — N18.6 ESRD (END STAGE RENAL DISEASE) ON DIALYSIS: ICD-10-CM

## 2024-09-27 PROBLEM — R94.39 ABNORMAL CARDIOVASCULAR STRESS TEST: Status: RESOLVED | Noted: 2019-02-27 | Resolved: 2024-09-27

## 2024-09-27 PROBLEM — I46.9 CARDIAC ARREST: Status: RESOLVED | Noted: 2019-10-04 | Resolved: 2024-09-27

## 2024-09-27 PROBLEM — R07.9 CHEST PAIN: Status: RESOLVED | Noted: 2022-11-12 | Resolved: 2024-09-27

## 2024-09-27 PROBLEM — R06.02 SHORTNESS OF BREATH: Status: RESOLVED | Noted: 2024-09-14 | Resolved: 2024-09-27

## 2024-09-27 PROBLEM — I48.19 PERSISTENT ATRIAL FIBRILLATION: Status: RESOLVED | Noted: 2022-11-12 | Resolved: 2024-09-27

## 2024-09-27 PROCEDURE — 99999 PR PBB SHADOW E&M-EST. PATIENT-LVL III: CPT | Mod: PBBFAC,,, | Performed by: INTERNAL MEDICINE

## 2024-09-27 NOTE — PROGRESS NOTES
[facility]   New Visit Progress Note   Recent Hospital Discharge     PRESENTING HISTORY     Chief Complaint/Reason for Admission:  Follow up Hospital Discharge   PCP: Perez Bell DO    History of Present Illness:  Mr. Domingo Gross is a 64 y.o. male who was recently admitted to the hospital.    [facility]  Hospital Discharge Summary  Team- [facility]  Attending- Nima Ervin MD   Admit: 9/14/24  Discharge: 9/17/24  ___________________________________________________________________    Today:  [facility] to [facility] for initial hospital follow up.  Recently hospitalized for evaluation of dyspnea which is his anginal equivalent.   No discharge summary available for reference.  Admitted to [facility] service with Cardiology and Nephrology consultation.  Patient with ESRD on outpatient hemodialysis-> nephrology team coordinated in house dialysis.  Patient underwent C 9/17/24-> PTCA and intravascular lithotripsy of Lcx and OM with excellent results ; Patent stents in RCA and LAD.  Patient responded well to above interventions and supportive care.  Discharged to home.    Saw Cardiology team 9/25/24- notes reviewed.     Patient accompanied today by family.  He is ambulatory and independent with ADL's.  He has returned to work as .  No recurrence of chest pain or dyspnea.  Brought all medication bottles for review and reports compliance.    Review of Systems  General ROS: negative for chills, fever or weight loss  Psychological ROS: negative for hallucination, depression or suicidal ideation  Ophthalmic ROS: negative for blurry vision, photophobia or eye pain  ENT ROS: negative for epistaxis, sore throat or rhinorrhea  Respiratory ROS: no cough, shortness of breath, or wheezing  Cardiovascular ROS: no chest pain or dyspnea on exertion  Gastrointestinal ROS: no abdominal pain, change in bowel habits, or black/ bloody  stools  Genito-Urinary ROS: no dysuria, trouble voiding, or hematuria  Musculoskeletal ROS: negative for gait disturbance or muscular weakness  Neurological ROS: no syncope or seizures; no ataxia  Dermatological ROS: negative for pruritis, rash and jaundice    PAST HISTORY:     Past Medical History:   Diagnosis Date    Acute congestive heart failure 9/13/2024    Allergy     Anemia     Anticoagulant long-term use     Arthritis     CKD (chronic kidney disease) stage 4, GFR 15-29 ml/min     COPD (chronic obstructive pulmonary disease) 08/20/2021    Coronary artery disease     Heart attack 10/04/2019    Hematuria     Hemothorax     Hyperlipidemia     Hypertension     Hyperuricemia     Hypocalcemia     Hypokalemia     Hypophosphatemia     Hypothyroidism 3/2/2023    PAD (peripheral artery disease)     Proteinuria     Vitamin D deficiency        Past Surgical History:   Procedure Laterality Date    ABDOMINAL AORTOGRAPHY N/A 5/10/2019    Procedure: AORTOGRAM-ABDOMINAL;  Surgeon: Keo Jenkins MD;  Location: [facility] CATH LAB/EP;  Service: Cardiology;  Laterality: N/A;  RSFA intervention     AORTOGRAPHY WITH SERIALOGRAPHY N/A 12/3/2021    Procedure: AORTOGRAM, WITH SERIALOGRAPHY;  Surgeon: Keo Jenkins MD;  Location: [facility] CATH LAB/EP;  Service: Cardiology;  Laterality: N/A;    AORTOGRAPHY WITH SERIALOGRAPHY N/A 4/1/2022    Procedure: AORTOGRAM, WITH SERIALOGRAPHY;  Surgeon: Keo Jenkins MD;  Location: [facility] CATH LAB/EP;  Service: Cardiology;  Laterality: N/A;    ATHERECTOMY, CORONARY N/A 4/25/2023    Procedure: Atherectomy-coronary;  Surgeon: Keo Jenkins MD;  Location: [facility] CATH LAB/EP;  Service: Cardiology;  Laterality: N/A;    BONE MARROW BIOPSY Right 10/12/2021    Procedure: BIOPSY-BONE MARROW;  Surgeon: Naseem Woods MD;  Location: [facility] OR;  Service: Oncology;  Laterality: Right;    CARDIAC CATHETERIZATION      CATARACT EXTRACTION      CATARACT EXTRACTION W/  INTRAOCULAR LENS IMPLANT Right 6/8/2020    Procedure:  EXTRACTION, CATARACT, WITH IOL INSERTION;  Surgeon: Tin Light MD;  Location: [facility] OR;  Service: Ophthalmology;  Laterality: Right;    CATARACT EXTRACTION W/  INTRAOCULAR LENS IMPLANT Left 7/2/2020    Procedure: EXTRACTION, CATARACT, WITH IOL INSERTION;  Surgeon: Tin Light MD;  Location: [facility] OR;  Service: Ophthalmology;  Laterality: Left;    COLONOSCOPY N/A 1/6/2022    Procedure: COLONOSCOPY;  Surgeon: Kathe Penaloza MD;  Location: [facility] ENDO (2ND FLR);  Service: Endoscopy;  Laterality: N/A;  COVID test at [facility] on 1/3-GT  okay to hold Plavix for 5 days and aspirin per Dr. Becerra  2nd floor due toextensive cardiac history   instructions mailed and my [facility] portal -     CORONARY ANGIOGRAPHY N/A 3/29/2019    Procedure: ANGIOGRAM, CORONARY ARTERY;  Surgeon: Keo Jenkins MD;  Location: [facility] CATH LAB/EP;  Service: Cardiology;  Laterality: N/A;    CORONARY ANGIOGRAPHY Left 10/11/2019    Procedure: ANGIOGRAM, CORONARY ARTERY;  Surgeon: Keo Jenkins MD;  Location: [facility] CATH LAB/EP;  Service: Cardiology;  Laterality: Left;    CORONARY ANGIOGRAPHY N/A 4/10/2023    Procedure: ANGIOGRAM, CORONARY ARTERY;  Surgeon: Keo Jenkins MD;  Location: [facility] CATH LAB/EP;  Service: Cardiology;  Laterality: N/A;    CORONARY ANGIOGRAPHY N/A 6/26/2024    Procedure: ANGIOGRAM, CORONARY ARTERY;  Surgeon: Enoch Tirado MD;  Location: [facility] CATH LAB/EP;  Service: Cardiology;  Laterality: N/A;    CORONARY ANGIOGRAPHY N/A 9/16/2024    Procedure: ANGIOGRAM, CORONARY ARTERY;  Surgeon: Enoch Tirado MD;  Location: [facility] CATH LAB/EP;  Service: Cardiology;  Laterality: N/A;    CORONARY ANGIOPLASTY WITH STENT PLACEMENT  03/29/2019    mid and distal RCA    ESOPHAGOGASTRODUODENOSCOPY N/A 1/6/2022    Procedure: EGD (ESOPHAGOGASTRODUODENOSCOPY);  Surgeon: Kathe Penaloza MD;  Location: [facility] ENDO (2ND FLR);  Service: Endoscopy;  Laterality: N/A;    EYE SURGERY      INSERTION OF TUNNELED CENTRAL VENOUS HEMODIALYSIS  CATHETER N/A 2/9/2024    Procedure: INSERTION, CATHETER, HEMODIALYSIS, DUAL LUMEN;  Surgeon: Wang Mendieta MD;  Location: [facility] OR;  Service: General;  Laterality: N/A;    INSTANTANEOUS WAVE-FREE RATIO (IFR) N/A 4/25/2023    Procedure: Instantaneous Wave-Free Ratio (IFR);  Surgeon: Keo Jenkins MD;  Location: [facility] CATH LAB/EP;  Service: Cardiology;  Laterality: N/A;    INTRAVASCULAR ULTRASOUND, NON-CORONARY  8/12/2022    Procedure: Intravascular Ultrasound, Non-Coronary;  Surgeon: Keo Jenkins MD;  Location: [facility] CATH LAB/EP;  Service: Cardiology;;    IVUS, CORONARY  4/25/2023    Procedure: IVUS, Coronary;  Surgeon: Keo Jenkins MD;  Location: [facility] CATH LAB/EP;  Service: Cardiology;;    IVUS, CORONARY  5/16/2023    Procedure: IVUS, Coronary;  Surgeon: Keo Jenkins MD;  Location: [facility] CATH LAB/EP;  Service: Cardiology;;  CX    LEFT HEART CATHETERIZATION N/A 3/29/2019    Procedure: Left heart cath;  Surgeon: Keo Jenkins MD;  Location: [facility] CATH LAB/EP;  Service: Cardiology;  Laterality: N/A;    LEFT HEART CATHETERIZATION Left 10/8/2019    Procedure: Left heart cath;  Surgeon: Keo Jenkins MD;  Location: [facility] CATH LAB/EP;  Service: Cardiology;  Laterality: Left;    LEFT HEART CATHETERIZATION Left 4/10/2023    Procedure: Left heart cath;  Surgeon: Keo Jenkins MD;  Location: [facility] CATH LAB/EP;  Service: Cardiology;  Laterality: Left;    LEFT HEART CATHETERIZATION Left 4/25/2023    Procedure: Left heart cath;  Surgeon: Keo Jenkins MD;  Location: [facility] CATH LAB/EP;  Service: Cardiology;  Laterality: Left;    LEFT HEART CATHETERIZATION Left 5/16/2023    Procedure: Left heart cath;  Surgeon: Keo Jenkins MD;  Location: [facility] CATH LAB/EP;  Service: Cardiology;  Laterality: Left;    LEFT HEART CATHETERIZATION Left 6/26/2024    Procedure: Left heart cath;  Surgeon: Enoch Tirado MD;  Location: [facility] CATH LAB/EP;  Service: Cardiology;  Laterality: Left;    LEFT HEART CATHETERIZATION Left  9/16/2024    Procedure: Left heart cath;  Surgeon: Enoch Tirado MD;  Location: [facility] CATH LAB/EP;  Service: Cardiology;  Laterality: Left;    LITHOTRIPSY, CORONARY TRANSLUMINAL, PERCUTANEOUS  9/16/2024    Procedure: LITHOTRIPSY, CORONARY TRANSLUMINAL, PERCUTANEOUS;  Surgeon: Enoch Tirado MD;  Location: [facility] CATH LAB/EP;  Service: Cardiology;;    PERCUTANEOUS CORONARY INTERVENTION, ARTERY N/A 6/26/2024    Procedure: Percutaneous coronary intervention;  Surgeon: Enoch Tirado MD;  Location: [facility] CATH LAB/EP;  Service: Cardiology;  Laterality: N/A;    PERCUTANEOUS TRANSLUMINAL ANGIOPLASTY (PTA) OF PERIPHERAL VESSEL N/A 7/12/2019    Procedure: PTA, PERIPHERAL VESSEL;  Surgeon: Keo Jenkins MD;  Location: [facility] CATH LAB/EP;  Service: Cardiology;  Laterality: N/A;    PERCUTANEOUS TRANSLUMINAL ANGIOPLASTY (PTA) OF PERIPHERAL VESSEL Left 5/20/2022    Procedure: PTA, PERIPHERAL VESSEL;  Surgeon: Keo Jenkins MD;  Location: [facility] CATH LAB/EP;  Service: Cardiology;  Laterality: Left;    PERCUTANEOUS TRANSLUMINAL ANGIOPLASTY (PTA) OF PERIPHERAL VESSEL Right 8/12/2022    Procedure: PTA, PERIPHERAL VESSEL;  Surgeon: Keo Jenkins MD;  Location: [facility] CATH LAB/EP;  Service: Cardiology;  Laterality: Right;    PERCUTANEOUS TRANSLUMINAL BALLOON ANGIOPLASTY OF CORONARY ARTERY  4/25/2023    Procedure: Angioplasty-coronary;  Surgeon: Keo Jenkins MD;  Location: [facility] CATH LAB/EP;  Service: Cardiology;;    PLACEMENT OF ARTERIOVENOUS GRAFT Left 4/15/2024    Procedure: INSERTION, GRAFT, ARTERIOVENOUS;  Surgeon: Scott Pascual MD;  Location: [facility] OR;  Service: General;  Laterality: Left;    PTCA, SINGLE VESSEL  5/16/2023    Procedure: PTCA, Single Vessel;  Surgeon: Keo Jenkins MD;  Location: [facility] CATH LAB/EP;  Service: Cardiology;;  CX    PTCA, SINGLE VESSEL  6/26/2024    Procedure: PTCA, Single Vessel;  Surgeon: Enoch Tirado MD;  Location: [facility] CATH LAB/EP;  Service: Cardiology;;    PTCA,  SINGLE VESSEL Left 9/16/2024    Procedure: PTCA, Single Vessel;  Surgeon: Enoch Tirado MD;  Location: [facility] CATH LAB/EP;  Service: Cardiology;  Laterality: Left;    REMOVAL OF HEMODIALYSIS CATHETER Right 2/9/2024    Procedure: REMOVAL, CATHETER, HEMODIALYSIS;  Surgeon: Wang Mendieta MD;  Location: [facility] OR;  Service: General;  Laterality: Right;    REMOVAL OF TUNNELED CENTRAL VENOUS CATHETER (CVC) Right 5/20/2024    Procedure: REMOVAL, CATHETER, CENTRAL VENOUS, TUNNELED;  Surgeon: Scott Pascual MD;  Location: [facility] OR;  Service: General;  Laterality: Right;    REPAIR, CHRONIC TOTAL OCCLUSION, CORONARY  6/26/2024    Procedure: Repair, Chronic Total Occlusion, Coronary;  Surgeon: Enoch Tirado MD;  Location: [facility] CATH LAB/EP;  Service: Cardiology;;    STENT, DRUG ELUTING, SINGLE VESSEL, CORONARY  4/25/2023    Procedure: Stent, Drug Eluting, Single Vessel, Coronary;  Surgeon: Keo Jenkins MD;  Location: [facility] CATH LAB/EP;  Service: Cardiology;;    STENT, DRUG ELUTING, SINGLE VESSEL, CORONARY  5/16/2023    Procedure: Stent, Drug Eluting, Single Vessel, Coronary;  Surgeon: Keo Jenkins MD;  Location: [facility] CATH LAB/EP;  Service: Cardiology;;  CX    TRANSESOPHAGEAL ECHOCARDIOGRAM WITH POSSIBLE CARDIOVERSION (JOSE W/ POSS CARDIOVERSION) N/A 6/19/2023    Procedure: Transesophageal echo (JOSE) intra-procedure log documentation;  Surgeon: Keo Jenkins MD;  Location: [facility] CATH LAB/EP;  Service: Cardiology;  Laterality: N/A;       Family History   Problem Relation Name Age of Onset    Aneurysm Mother      Hypertension Mother      Heart disease Mother      Cancer Father      Diabetes Sister 1     Hypertension Sister 1     No Known Problems Brother 1     No Known Problems Son 1          MEDICATIONS & ALLERGIES:     Current Outpatient Medications on File Prior to Visit   Medication Sig Dispense Refill    albuterol-ipratropium (DUO-NEB) 2.5 mg-0.5 mg/3 mL nebulizer solution Use 1 vial by nebulization  every 6 (six) hours as needed for Wheezing or Shortness of Breath (or cough). Rescue 90 mL 3    amLODIPine (NORVASC) 5 MG tablet Take 1 tablet (5 mg total) by mouth once daily. 90 tablet 3    apixaban (ELIQUIS) 5 mg Tab Take 1 tablet (5 mg total) by mouth 2 (two) times daily. 180 tablet 3    carvediloL (COREG) 25 MG tablet Take 1 tablet (25 mg total) by mouth 2 (two) times daily with meals. 180 tablet 3    clopidogreL (PLAVIX) 75 mg tablet Take 1 tablet (75 mg total) by mouth once daily. 90 tablet 3    coenzyme Q10 100 mg capsule Take 1 capsule (100 mg total) by mouth once daily. 90 capsule 3    eplerenone (INSPRA) 50 MG Tab Take 1 tablet (50 mg total) by mouth once daily. 90 tablet 3    furosemide (LASIX) 80 MG tablet Take 1 tablet (80 mg total) by mouth every other day. Non dialysis days only   Monday, Wednesday, Friday , Sunday 15 tablet 11    hydrALAZINE (APRESOLINE) 50 MG tablet Take 1 tablet (50 mg total) by mouth every 8 (eight) hours. 270 tablet 3    hydrOXYzine pamoate (VISTARIL) 25 MG Cap TAKE 1 CAPSULE(25 MG) BY MOUTH EVERY NIGHT AS NEEDED FOR INSOMNIA OR ANXIETY 30 capsule 0    isosorbide mononitrate (IMDUR) 30 MG 24 hr tablet Take 1 tablet (30 mg total) by mouth once daily. 90 tablet 3    levothyroxine (SYNTHROID) 75 MCG tablet Take 1 tablet (75 mcg total) by mouth before breakfast. 30 tablet 11    nitroGLYCERIN (NITROSTAT) 0.4 MG SL tablet Place 1 tablet (0.4 mg total) under the tongue every 5 (five) minutes as needed for Chest pain (for a max of 3 tabs in 15 minutes). 25 tablet 4    ranolazine (RANEXA) 1,000 mg Tb12 Take 1 tablet (1,000 mg total) by mouth 2 (two) times daily. 180 tablet 3    rosuvastatin (CRESTOR) 40 MG Tab Take 1 tablet (40 mg total) by mouth every evening. 90 tablet 3     No current facility-administered medications on file prior to visit.        Review of patient's allergies indicates:   Allergen Reactions    Ace inhibitors Rash       OBJECTIVE:     Vital Signs:  BP (!) 118/54 (BP  "Location: Right arm, Patient Position: Sitting, BP Method: Large (Automatic))   Pulse 68   Ht 5' 10" (1.778 m)   Wt 79.3 kg (174 lb 13.2 oz)   SpO2 95%   BMI 25.08 kg/m²   Wt Readings from Last 3 Encounters:   09/25/24 1420 78 kg (172 lb)   09/14/24 2214 70 kg (154 lb 5.2 oz)   09/14/24 2115 70 kg (154 lb 5.2 oz)   09/14/24 1508 75.7 kg (166 lb 14.2 oz)   09/14/24 1313 77.1 kg (169 lb 15.6 oz)   09/14/24 0145 77.1 kg (169 lb 15.6 oz)   09/13/24 1404 77.1 kg (170 lb)   09/09/24 1750 104.1 kg (229 lb 8 oz)   09/09/24 0705 81.6 kg (180 lb)     Body mass index is 25.08 kg/m².        Physical Exam:  BP (!) 118/54 (BP Location: Right arm, Patient Position: Sitting, BP Method: Large (Automatic))   Pulse 68   Ht 5' 10" (1.778 m)   Wt 79.3 kg (174 lb 13.2 oz)   SpO2 95%   BMI 25.08 kg/m²   General appearance: alert, cooperative, no distress  Constitutional:Oriented to person, place, and time  + appears well-developed and well-nourished.   HEENT: Normocephalic, atraumatic, neck symmetrical, no nasal discharge   Eyes: conjunctivae/corneas clear, PERRL, EOM's intact  Lungs: clear to auscultation bilaterally, no dullness to percussion bilaterally  Heart: regular rate and rhythm without rub; no displacement of the PMI   Abdomen: soft, non-tender; bowel sounds normoactive; no organomegaly  Extremities: extremities symmetric; no clubbing, cyanosis, or edema  + left arm hemodialysis fistula   Integument: Skin color, texture, turgor normal; no rashes; hair distrubution normal  Neurologic: Alert and oriented X 3, normal strength, normal coordination and gait  Psychiatric: no pressured speech; normal affect; no evidence of impaired cognition     Laboratory  Lab Results   Component Value Date    WBC 8.50 09/16/2024    HGB 9.5 (L) 09/16/2024    HCT 28.0 (L) 09/16/2024    MCV 85 09/16/2024     09/16/2024     BMP  Lab Results   Component Value Date     (L) 09/16/2024    K 3.7 09/16/2024    CL 93 (L) 09/16/2024    CO2 " 21 (L) 09/16/2024    BUN 76 (H) 09/16/2024    CREATININE 6.9 (H) 09/16/2024    CALCIUM 9.1 09/16/2024    ANIONGAP 17 (H) 09/16/2024    EGFRNORACEVR 8 (A) 09/16/2024     Lab Results   Component Value Date    ALT 7 (L) 09/13/2024    AST 15 09/13/2024    ALKPHOS 62 09/13/2024    BILITOT 0.7 09/13/2024     Lab Results   Component Value Date    INR 1.3 (H) 09/14/2024    INR 1.4 (H) 09/14/2024    INR 1.3 (H) 06/25/2024     Lab Results   Component Value Date    HGBA1C 4.6 06/25/2024       Diagnostic Results:    Chest X Ray 9/9/24:  Interstitial predominant opacities bilaterally would be in keeping with pulmonary edema, noting that infectious or noninfectious inflammatory etiology could appear similar.  At least small bilateral pleural effusions.    TTE 9/14/24:    Left Ventricle: The left ventricle is mildly dilated. There is eccentric hypertrophy. Regional wall motion abnormalities present. See diagram for wall motion findings. Septal motion is consistent with bundle branch block. There is normal systolic function. Biplane (2D) method of discs ejection fraction is 54%. Grade II diastolic dysfunction.    Right Ventricle: Normal right ventricular cavity size. Systolic function is normal. TAPSE is 2.09 cm.    Left Atrium: Left atrium is moderately dilated. The left atrium volume index is 45.2 mL/m2.    Right Atrium: Right atrium is mildly dilated.    Mitral Valve: There is mild regurgitation.    Pulmonic Valve: There is mild regurgitation.    Pulmonary Artery: The estimated pulmonary artery systolic pressure is 11 mmHg.    IVC/SVC: Normal venous pressure at 3 mmHg.      TRANSITION OF CARE:     [facility] Contact Note: 9/19/24  3 unsuccessful attempts  - 9/19/24 @ 8:52 AM  - 9/18/24 @ 4:09 PM  - 9/18/24 @ 11:55 AM     Family and/or Caretaker present at visit?  Yes.  Diagnostic tests reviewed/disposition: I have reviewed all completed as well as pending diagnostic tests at the time of discharge.  Disease/illness  education: Yes   Home health/community services discussion/referrals: Patient does not have home health established from hospital visit.  They do not need home health.  If needed, we will set up home health for the patient.   Establishment or re-establishment of referral orders for community resources: No other necessary community resources.   Discussion with other health care providers:  Discussed with Cardiology team regarding medication recommendations.  .     ASSESSMENT & PLAN:     Coronary artery disease involving native heart with other form of angina pectoris, unspecified vessel or lesion type  - recent hospitalization as above  - s/p [facility] 9/17/24-> PTCA and intravascular lithotripsy of Lcx and OM with excellent results ; Patent stents in RCA and LAD.  - continue current medication regimen  - return to Cardiology 1/28/25    ESRD (end stage renal disease) on dialysis  - HD [facility] on T, Th, Sat  - Nephrologist is Dr Loly Payne  - LUE fistula     Paroxysmal atrial fibrillation  Anticoagulated  - continue current regimen    Restless sleeper  - wife concerned about restless sleep, excessive nocturnal myoclonus   - see Sleep team 10/1/24  -     Ambulatory referral/consult to Sleep Disorders; Future; Expected date: 10/04/2024    Former smoker   - Former smoker, Quit 30 years ago  - Smoked 1 PPD x 30 years    Patient will be released from hospital follow up clinic.  Follow up as detailed below.     Instructions for the patient:      Scheduled Follow-up :  Future Appointments   Date Time Provider Department Center   10/1/2024  8:30 AM Lejeune, Elizabeth B, NP [facility] SLEEP [place]   12/16/2024  1:40 PM Perez Bell DO [facility] MED [place]   1/28/2025  1:40 PM Enoch Tirado MD [facility] CARDIO [facility] [facility]       Post Visit Medication List:     Medication List            Accurate as of September 27, 2024  1:16 PM. If you have any questions, ask your nurse or doctor.                CONTINUE taking these  medications      albuterol-ipratropium 2.5 mg-0.5 mg/3 mL nebulizer solution  Commonly known as: DUO-NEB  Use 1 vial by nebulization every 6 (six) hours as needed for Wheezing or Shortness of Breath (or cough). Rescue     amLODIPine 5 MG tablet  Commonly known as: NORVASC  Take 1 tablet (5 mg total) by mouth once daily.     apixaban 5 mg Tab  Commonly known as: ELIQUIS  Take 1 tablet (5 mg total) by mouth 2 (two) times daily.     carvediloL 25 MG tablet  Commonly known as: COREG  Take 1 tablet (25 mg total) by mouth 2 (two) times daily with meals.     clopidogreL 75 mg tablet  Commonly known as: PLAVIX  Take 1 tablet (75 mg total) by mouth once daily.     coenzyme Q10 100 mg capsule  Take 1 capsule (100 mg total) by mouth once daily.     eplerenone 50 MG Tab  Commonly known as: INSPRA  Take 1 tablet (50 mg total) by mouth once daily.     furosemide 80 MG tablet  Commonly known as: LASIX  Take 1 tablet (80 mg total) by mouth every other day. Non dialysis days only   Monday, Wednesday, Friday , Sunday     hydrALAZINE 50 MG tablet  Commonly known as: APRESOLINE  Take 1 tablet (50 mg total) by mouth every 8 (eight) hours.     hydrOXYzine pamoate 25 MG Cap  Commonly known as: VISTARIL  TAKE 1 CAPSULE(25 MG) BY MOUTH EVERY NIGHT AS NEEDED FOR INSOMNIA OR ANXIETY     isosorbide mononitrate 30 MG 24 hr tablet  Commonly known as: IMDUR  Take 1 tablet (30 mg total) by mouth once daily.     levothyroxine 75 MCG tablet  Commonly known as: SYNTHROID  Take 1 tablet (75 mcg total) by mouth before breakfast.     nitroGLYCERIN 0.4 MG SL tablet  Commonly known as: NITROSTAT  Place 1 tablet (0.4 mg total) under the tongue every 5 (five) minutes as needed for Chest pain (for a max of 3 tabs in 15 minutes).     ranolazine 1,000 mg Tb12  Commonly known as: RANEXA  Take 1 tablet (1,000 mg total) by mouth 2 (two) times daily.     rosuvastatin 40 MG Tab  Commonly known as: CRESTOR  Take 1 tablet (40 mg total) by mouth every evening.               Signing Physician:  Adelaide Puga MD

## 2024-09-28 NOTE — DISCHARGE SUMMARY
[facility] Medicine  Discharge Summary      Patient Name: Domingo Gross  MRN: [id number]  NAOMI: [id number]  Patient Class: IP- Inpatient  Admission Date: 9/13/2024  Hospital Length of Stay: 4 days  Discharge Date and Time: 9/17/2024  3:28 PM  Attending Physician: No att. providers found   Discharging Provider: Yolanda Zambrano MD  Primary Care Provider: Perez Bell DO    Primary Care Team: Networked reference to record PCT     HPI:   This is a 64-year-old male with a history of end-stage renal disease on renal dialysis, COPD, hypertension, diabetes mellitus type, and CHF who presents to emergency department with 5 days' history of having worsening shortness of breath and chest discomfort.  Patient was seen in the ED and he was given DuoNebs and Lasix and he had improvement.  However he still was desatting to about 84% and complaining of chest discomfort.  Patient was put on supplemental oxygen and due to patient's presenting symptoms he will require admission for further management.    At time of my examination patient denied any headache, fever, chills, hemoptysis, a complaint of shortness of breath and chest pain.    Procedure(s) (LRB):  ANGIOGRAM, CORONARY ARTERY (N/A)  Left heart cath (Left)  PTCA, Single Vessel (Left)  LITHOTRIPSY, CORONARY TRANSLUMINAL, PERCUTANEOUS      Hospital Course:   No notes on file     Goals of Care Treatment Preferences:  Code Status: Full Code      SDOH Screening:  The patient was screened for utility difficulties, food insecurity, transport difficulties, housing insecurity, and interpersonal safety and there were no concerns identified this admission.     Consults:   Consults (From admission, onward)          Status Ordering Provider     Inpatient consult to [facility]iology[facility]  Once        Provider:  (Not yet assigned)    Completed ERICH FRAZIER            Cardiac/Vascular  S/P drug eluting coronary stent placement  Assessment:   PTCA and  intravascular lithotripsy of Lcx and OM with excellent results   Patent stents in RCA and LAD     - originally recommend triple therapy for one month but discussed this AM with Dr. Salazar with recommendations for Plavix and Eliquis only given bleeding ris           Final Active Diagnoses:    Diagnosis Date Noted POA    PRINCIPAL PROBLEM:  Atherosclerosis of native coronary artery of native heart with unstable angina pectoris [I25.110] 09/14/2024 Yes    S/P drug eluting coronary stent placement [Z95.5] 09/14/2024 Not Applicable    Acute congestive heart failure [I50.9] 09/13/2024 Yes    ESRD (end stage renal disease) on dialysis [N18.6, Z99.2] 02/16/2024 Not Applicable    Paroxysmal atrial fibrillation [I48.0] 08/24/2023 Yes    COPD exacerbation [J44.1] 08/20/2021 Yes    Coronary artery disease [I25.10] 03/29/2019 Yes    PAD (peripheral artery disease) [I73.9] 05/21/2014 Yes    HTN (hypertension) [I10] 04/29/2013 Yes      Problems Resolved During this Admission:    Diagnosis Date Noted Date Resolved POA    Shortness of breath [R06.02] 09/14/2024 09/27/2024 Unknown    Chest pain [R07.9] 11/12/2022 09/27/2024 Yes       Discharged Condition: stable    Disposition: Home or Self Care    Follow Up:   Follow-up Information       Enoch Tirado MD Follow up on 9/25/2024.    Specialties: Cardiology, Interventional Cardiology  Why: at 9:30 AM; cardiology hospital follow up appointment  Contact information:  [address]  [address]  [place] [address]  [phone]               Adelaide Puga MD Follow up on 9/27/2024.    Specialty: Internal Medicine  Why: at 9:30 AM; hospital follow up appointment in the [facility]  Contact information:  [address]  [address]  [place] [address]  [phone]                           Patient Instructions:      Diet renal     Notify your health care provider if you experience any of the following:  increased confusion or weakness     Notify your health care  provider if you experience any of the following:  persistent dizziness, light-headedness, or visual disturbances     Notify your health care provider if you experience any of the following:  temperature >100.4     Notify your health care provider if you experience any of the following:  persistent nausea and vomiting or diarrhea     Notify your health care provider if you experience any of the following:  severe uncontrolled pain     Notify your health care provider if you experience any of the following:  redness, tenderness, or signs of infection (pain, swelling, redness, odor or green/yellow discharge around incision site)     Notify your health care provider if you experience any of the following:  difficulty breathing or increased cough     Notify your health care provider if you experience any of the following:  severe persistent headache     Activity as tolerated       Significant Diagnostic Studies: N/A    Pending Diagnostic Studies:       None           Medications:  Reconciled Home Medications:      Medication List        CHANGE how you take these medications      albuterol-ipratropium 2.5 mg-0.5 mg/3 mL nebulizer solution  Commonly known as: DUO-NEB  Use 1 vial by nebulization every 6 (six) hours as needed for Wheezing or Shortness of Breath (or cough). Rescue  What changed: additional instructions            CONTINUE taking these medications      hydrOXYzine pamoate 25 MG Cap  Commonly known as: VISTARIL  TAKE 1 CAPSULE(25 MG) BY MOUTH EVERY NIGHT AS NEEDED FOR INSOMNIA OR ANXIETY     levothyroxine 75 MCG tablet  Commonly known as: SYNTHROID  Take 1 tablet (75 mcg total) by mouth before breakfast.     nitroGLYCERIN 0.4 MG SL tablet  Commonly known as: NITROSTAT  Place 1 tablet (0.4 mg total) under the tongue every 5 (five) minutes as needed for Chest pain (for a max of 3 tabs in 15 minutes).            STOP taking these medications      albuterol 90 mcg/actuation inhaler  Commonly known as:  PROVENTIL/VENTOLIN HFA              Indwelling Lines/Drains at time of discharge:   Lines/Drains/Airways       Drain  Duration                  Hemodialysis AV Fistula Left upper arm -- days                    Time spent on the discharge of patient: 35 minutes         Yolanda Zambrano MD  Department of [facility] Medicine  [facility]

## 2024-09-28 NOTE — ASSESSMENT & PLAN NOTE
Assessment:   PTCA and intravascular lithotripsy of Lcx and OM with excellent results   Patent stents in RCA and LAD     - originally recommend triple therapy for one month but discussed this AM with Dr. Salazar with recommendations for Plavix and Eliquis only given bleeding ris

## 2024-09-30 PROBLEM — I21.4 NSTEMI (NON-ST ELEVATED MYOCARDIAL INFARCTION): Status: RESOLVED | Noted: 2024-06-25 | Resolved: 2024-09-30

## 2024-10-09 ENCOUNTER — PATIENT MESSAGE (OUTPATIENT)
Dept: FAMILY MEDICINE | Facility: CLINIC | Age: 64
End: 2024-10-09
Payer: COMMERCIAL

## 2024-10-10 RX ORDER — HYDROXYZINE PAMOATE 25 MG/1
25 CAPSULE ORAL NIGHTLY PRN
Qty: 90 CAPSULE | Refills: 2 | Status: SHIPPED | OUTPATIENT
Start: 2024-10-10

## 2024-10-29 NOTE — TELEPHONE ENCOUNTER
Called patient and notified of lab results.    [Time Spent: ___ minutes] : I have spent [unfilled] minutes of time on the encounter which excludes teaching and separately reported services.

## 2024-11-01 ENCOUNTER — PATIENT MESSAGE (OUTPATIENT)
Dept: FAMILY MEDICINE | Facility: CLINIC | Age: 64
End: 2024-11-01
Payer: COMMERCIAL

## 2024-11-04 ENCOUNTER — PATIENT MESSAGE (OUTPATIENT)
Dept: ADMINISTRATIVE | Facility: OTHER | Age: 64
End: 2024-11-04
Payer: COMMERCIAL

## 2024-11-05 ENCOUNTER — OFFICE VISIT (OUTPATIENT)
Dept: FAMILY MEDICINE | Facility: CLINIC | Age: 64
End: 2024-11-05
Payer: COMMERCIAL

## 2024-11-05 VITALS
WEIGHT: 173.94 LBS | HEART RATE: 75 BPM | HEIGHT: 70 IN | SYSTOLIC BLOOD PRESSURE: 122 MMHG | OXYGEN SATURATION: 98 % | DIASTOLIC BLOOD PRESSURE: 82 MMHG | BODY MASS INDEX: 24.9 KG/M2

## 2024-11-05 DIAGNOSIS — S80.01XA TRAUMATIC HEMATOMA OF RIGHT KNEE, INITIAL ENCOUNTER: Primary | ICD-10-CM

## 2024-11-05 DIAGNOSIS — E21.3 HYPERPARATHYROIDISM: ICD-10-CM

## 2024-11-05 PROCEDURE — 3074F SYST BP LT 130 MM HG: CPT | Mod: CPTII,S$GLB,, | Performed by: STUDENT IN AN ORGANIZED HEALTH CARE EDUCATION/TRAINING PROGRAM

## 2024-11-05 PROCEDURE — 3008F BODY MASS INDEX DOCD: CPT | Mod: CPTII,S$GLB,, | Performed by: STUDENT IN AN ORGANIZED HEALTH CARE EDUCATION/TRAINING PROGRAM

## 2024-11-05 PROCEDURE — 3044F HG A1C LEVEL LT 7.0%: CPT | Mod: CPTII,S$GLB,, | Performed by: STUDENT IN AN ORGANIZED HEALTH CARE EDUCATION/TRAINING PROGRAM

## 2024-11-05 PROCEDURE — G2211 COMPLEX E/M VISIT ADD ON: HCPCS | Mod: S$GLB,,, | Performed by: STUDENT IN AN ORGANIZED HEALTH CARE EDUCATION/TRAINING PROGRAM

## 2024-11-05 PROCEDURE — 3079F DIAST BP 80-89 MM HG: CPT | Mod: CPTII,S$GLB,, | Performed by: STUDENT IN AN ORGANIZED HEALTH CARE EDUCATION/TRAINING PROGRAM

## 2024-11-05 PROCEDURE — 3062F POS MACROALBUMINURIA REV: CPT | Mod: CPTII,S$GLB,, | Performed by: STUDENT IN AN ORGANIZED HEALTH CARE EDUCATION/TRAINING PROGRAM

## 2024-11-05 PROCEDURE — 99999 PR PBB SHADOW E&M-EST. PATIENT-LVL V: CPT | Mod: PBBFAC,,, | Performed by: STUDENT IN AN ORGANIZED HEALTH CARE EDUCATION/TRAINING PROGRAM

## 2024-11-05 PROCEDURE — 99214 OFFICE O/P EST MOD 30 MIN: CPT | Mod: S$GLB,,, | Performed by: STUDENT IN AN ORGANIZED HEALTH CARE EDUCATION/TRAINING PROGRAM

## 2024-11-05 PROCEDURE — 1160F RVW MEDS BY RX/DR IN RCRD: CPT | Mod: CPTII,S$GLB,, | Performed by: STUDENT IN AN ORGANIZED HEALTH CARE EDUCATION/TRAINING PROGRAM

## 2024-11-05 PROCEDURE — 3066F NEPHROPATHY DOC TX: CPT | Mod: CPTII,S$GLB,, | Performed by: STUDENT IN AN ORGANIZED HEALTH CARE EDUCATION/TRAINING PROGRAM

## 2024-11-05 PROCEDURE — 1159F MED LIST DOCD IN RCRD: CPT | Mod: CPTII,S$GLB,, | Performed by: STUDENT IN AN ORGANIZED HEALTH CARE EDUCATION/TRAINING PROGRAM

## 2024-11-05 NOTE — PROGRESS NOTES
Progress Note  [facility]    Subjective:     Domingo Gross is a 64 y.o. year old male with current diagnoses of DDD, closed fracture of tranverse process of lumbar vertebra, nuclear sclerosis, COPD, HLD, HTN, PVD, CAD, carotid artery stenosis, PAF, s/p AV graft placement with ESRD on HD TThS, anemia due to CKD, hypothyroidism who presents to clinic for Knee pain.     Knee: R knee bruising and pain. Fell maureen gup the stairs last week. Having worsening bruising and a large swollen area at the inferior aspect. No fevers, redness travelling up the leg. Not painful with walking or moving. Does have pain with touching it. Mild discomfort with moving the knee.     Hyperparathyroidism: 2/2 ESRD. PTH stable. Seeing nephrology.     Patient Active Problem List    Diagnosis Date Noted    S/P drug eluting coronary stent placement 09/14/2024    Atherosclerosis of native coronary artery of native heart with unstable angina pectoris 09/14/2024    Acute congestive heart failure 09/13/2024    Pulmonary edema 09/09/2024    Anticoagulated 07/01/2024    Hemodialysis catheter dysfunction 05/16/2024    S/P arteriovenous (AV) graft placement 04/23/2024    Membranous nephropathy determined by biopsy 02/20/2024    Closed fracture of transverse process of lumbar vertebra 02/16/2024    ESRD (end stage renal disease) on dialysis 02/16/2024    Fall 02/16/2024    EBV infection 01/31/2024    CMV (cytomegalovirus infection) 01/31/2024    Paroxysmal atrial fibrillation 08/24/2023    Hypothyroidism 03/02/2023    Unstable angina 03/02/2023    Anemia due to chronic kidney disease, on chronic dialysis 12/15/2021    COPD exacerbation 08/20/2021    Nuclear sclerosis, bilateral 06/08/2020    Nephrotic range proteinuria 04/06/2020    Bilateral carotid artery stenosis     Coronary artery disease 03/29/2019    Venous insufficiency of both lower extremities 06/04/2018    Atherosclerosis of native artery of both lower  extremities with intermittent claudication 03/02/2018    Hyperlipidemia 06/12/2015    DJD (degenerative joint disease), lumbar 05/27/2015    Claudication in peripheral vascular disease 06/13/2014    PAD (peripheral artery disease) 05/21/2014    HTN (hypertension) 04/29/2013        Review of patient's allergies indicates:   Allergen Reactions    Ace inhibitors Rash        Past Medical History:   Diagnosis Date    Acute congestive heart failure 9/13/2024    Allergy     Anemia     Anticoagulant long-term use     Arthritis     CKD (chronic kidney disease) stage 4, GFR 15-29 ml/min     COPD (chronic obstructive pulmonary disease) 08/20/2021    Coronary artery disease     Heart attack 10/04/2019    Hematuria     Hemothorax     Hyperlipidemia     Hypertension     Hyperuricemia     Hypocalcemia     Hypokalemia     Hypophosphatemia     Hypothyroidism 3/2/2023    PAD (peripheral artery disease)     Proteinuria     Vitamin D deficiency       Past Surgical History:   Procedure Laterality Date    ABDOMINAL AORTOGRAPHY N/A 5/10/2019    Procedure: AORTOGRAM-ABDOMINAL;  Surgeon: Keo Jenkins MD;  Location: [facility] CATH LAB/EP;  Service: Cardiology;  Laterality: N/A;  RSFA intervention     AORTOGRAPHY WITH SERIALOGRAPHY N/A 12/3/2021    Procedure: AORTOGRAM, WITH SERIALOGRAPHY;  Surgeon: Keo Jenkins MD;  Location: [facility] CATH LAB/EP;  Service: Cardiology;  Laterality: N/A;    AORTOGRAPHY WITH SERIALOGRAPHY N/A 4/1/2022    Procedure: AORTOGRAM, WITH SERIALOGRAPHY;  Surgeon: Keo Jenkins MD;  Location: [facility] CATH LAB/EP;  Service: Cardiology;  Laterality: N/A;    ATHERECTOMY, CORONARY N/A 4/25/2023    Procedure: Atherectomy-coronary;  Surgeon: Keo Jenkins MD;  Location: [facility] CATH LAB/EP;  Service: Cardiology;  Laterality: N/A;    BONE MARROW BIOPSY Right 10/12/2021    Procedure: BIOPSY-BONE MARROW;  Surgeon: Naseem Woods MD;  Location: [facility] OR;  Service: Oncology;  Laterality: Right;    CARDIAC CATHETERIZATION      CATARACT  EXTRACTION      CATARACT EXTRACTION W/  INTRAOCULAR LENS IMPLANT Right 6/8/2020    Procedure: EXTRACTION, CATARACT, WITH IOL INSERTION;  Surgeon: Tin Light MD;  Location: [facility] OR;  Service: Ophthalmology;  Laterality: Right;    CATARACT EXTRACTION W/  INTRAOCULAR LENS IMPLANT Left 7/2/2020    Procedure: EXTRACTION, CATARACT, WITH IOL INSERTION;  Surgeon: Tin Light MD;  Location: [facility] OR;  Service: Ophthalmology;  Laterality: Left;    COLONOSCOPY N/A 1/6/2022    Procedure: COLONOSCOPY;  Surgeon: Kathe Penaloza MD;  Location: [facility] (2ND FLR);  Service: Endoscopy;  Laterality: N/A;  COVID test at [facility] on 1/3-GT  okay to hold Plavix for 5 days and aspirin per Dr. Becerra  2nd floor due toextensive cardiac history   instructions mailed and my [facility] portal -     CORONARY ANGIOGRAPHY N/A 3/29/2019    Procedure: ANGIOGRAM, CORONARY ARTERY;  Surgeon: Keo Jenkins MD;  Location: [facility] CATH LAB/EP;  Service: Cardiology;  Laterality: N/A;    CORONARY ANGIOGRAPHY Left 10/11/2019    Procedure: ANGIOGRAM, CORONARY ARTERY;  Surgeon: Keo Jenkins MD;  Location: [facility] CATH LAB/EP;  Service: Cardiology;  Laterality: Left;    CORONARY ANGIOGRAPHY N/A 4/10/2023    Procedure: ANGIOGRAM, CORONARY ARTERY;  Surgeon: Keo Jenkins MD;  Location: [facility] CATH LAB/EP;  Service: Cardiology;  Laterality: N/A;    CORONARY ANGIOGRAPHY N/A 6/26/2024    Procedure: ANGIOGRAM, CORONARY ARTERY;  Surgeon: Enoch Tirado MD;  Location: [facility] CATH LAB/EP;  Service: Cardiology;  Laterality: N/A;    CORONARY ANGIOGRAPHY N/A 9/16/2024    Procedure: ANGIOGRAM, CORONARY ARTERY;  Surgeon: Enoch Tirado MD;  Location: [facility] CATH LAB/EP;  Service: Cardiology;  Laterality: N/A;    CORONARY ANGIOPLASTY WITH STENT PLACEMENT  03/29/2019    mid and distal RCA    ESOPHAGOGASTRODUODENOSCOPY N/A 1/6/2022    Procedure: EGD (ESOPHAGOGASTRODUODENOSCOPY);  Surgeon: Kathe Penaloza MD;  Location: [facility] (2ND FLR);  Service: Endoscopy;   Laterality: N/A;    EYE SURGERY      INSERTION OF TUNNELED CENTRAL VENOUS HEMODIALYSIS CATHETER N/A 2/9/2024    Procedure: INSERTION, CATHETER, HEMODIALYSIS, DUAL LUMEN;  Surgeon: Wang Mendieta MD;  Location: [facility] OR;  Service: General;  Laterality: N/A;    INSTANTANEOUS WAVE-FREE RATIO (IFR) N/A 4/25/2023    Procedure: Instantaneous Wave-Free Ratio (IFR);  Surgeon: Keo Jenkins MD;  Location: [facility] CATH LAB/EP;  Service: Cardiology;  Laterality: N/A;    INTRAVASCULAR ULTRASOUND, NON-CORONARY  8/12/2022    Procedure: Intravascular Ultrasound, Non-Coronary;  Surgeon: Keo Jenkins MD;  Location: [facility] CATH LAB/EP;  Service: Cardiology;;    IVUS, CORONARY  4/25/2023    Procedure: IVUS, Coronary;  Surgeon: Keo Jenkins MD;  Location: [facility] CATH LAB/EP;  Service: Cardiology;;    IVUS, CORONARY  5/16/2023    Procedure: IVUS, Coronary;  Surgeon: Keo Jenkins MD;  Location: [facility] CATH LAB/EP;  Service: Cardiology;;  CX    LEFT HEART CATHETERIZATION N/A 3/29/2019    Procedure: Left heart cath;  Surgeon: Keo Jenkins MD;  Location: [facility] CATH LAB/EP;  Service: Cardiology;  Laterality: N/A;    LEFT HEART CATHETERIZATION Left 10/8/2019    Procedure: Left heart cath;  Surgeon: Keo Jenkins MD;  Location: [facility] CATH LAB/EP;  Service: Cardiology;  Laterality: Left;    LEFT HEART CATHETERIZATION Left 4/10/2023    Procedure: Left heart cath;  Surgeon: Keo Jenkins MD;  Location: [facility] CATH LAB/EP;  Service: Cardiology;  Laterality: Left;    LEFT HEART CATHETERIZATION Left 4/25/2023    Procedure: Left heart cath;  Surgeon: Keo Jenkins MD;  Location: [facility] CATH LAB/EP;  Service: Cardiology;  Laterality: Left;    LEFT HEART CATHETERIZATION Left 5/16/2023    Procedure: Left heart cath;  Surgeon: Keo Jenkins MD;  Location: [facility] CATH LAB/EP;  Service: Cardiology;  Laterality: Left;    LEFT HEART CATHETERIZATION Left 6/26/2024    Procedure: Left heart cath;  Surgeon: Enoch Tirado MD;  Location: [facility] CATH  LAB/EP;  Service: Cardiology;  Laterality: Left;    LEFT HEART CATHETERIZATION Left 9/16/2024    Procedure: Left heart cath;  Surgeon: Enoch Tirado MD;  Location: [facility] CATH LAB/EP;  Service: Cardiology;  Laterality: Left;    LITHOTRIPSY, CORONARY TRANSLUMINAL, PERCUTANEOUS  9/16/2024    Procedure: LITHOTRIPSY, CORONARY TRANSLUMINAL, PERCUTANEOUS;  Surgeon: Enoch Tirado MD;  Location: [facility] CATH LAB/EP;  Service: Cardiology;;    PERCUTANEOUS CORONARY INTERVENTION, ARTERY N/A 6/26/2024    Procedure: Percutaneous coronary intervention;  Surgeon: Enoch Tirado MD;  Location: [facility] CATH LAB/EP;  Service: Cardiology;  Laterality: N/A;    PERCUTANEOUS TRANSLUMINAL ANGIOPLASTY (PTA) OF PERIPHERAL VESSEL N/A 7/12/2019    Procedure: PTA, PERIPHERAL VESSEL;  Surgeon: Keo Jenkins MD;  Location: [facility] CATH LAB/EP;  Service: Cardiology;  Laterality: N/A;    PERCUTANEOUS TRANSLUMINAL ANGIOPLASTY (PTA) OF PERIPHERAL VESSEL Left 5/20/2022    Procedure: PTA, PERIPHERAL VESSEL;  Surgeon: Keo Jenkins MD;  Location: [facility] CATH LAB/EP;  Service: Cardiology;  Laterality: Left;    PERCUTANEOUS TRANSLUMINAL ANGIOPLASTY (PTA) OF PERIPHERAL VESSEL Right 8/12/2022    Procedure: PTA, PERIPHERAL VESSEL;  Surgeon: Keo Jenkins MD;  Location: [facility] CATH LAB/EP;  Service: Cardiology;  Laterality: Right;    PERCUTANEOUS TRANSLUMINAL BALLOON ANGIOPLASTY OF CORONARY ARTERY  4/25/2023    Procedure: Angioplasty-coronary;  Surgeon: Keo Jenkins MD;  Location: [facility] CATH LAB/EP;  Service: Cardiology;;    PLACEMENT OF ARTERIOVENOUS GRAFT Left 4/15/2024    Procedure: INSERTION, GRAFT, ARTERIOVENOUS;  Surgeon: Scott Pascual MD;  Location: [facility] OR;  Service: General;  Laterality: Left;    PTCA, SINGLE VESSEL  5/16/2023    Procedure: PTCA, Single Vessel;  Surgeon: Keo Jenkins MD;  Location: [facility] CATH LAB/EP;  Service: Cardiology;;  CX    PTCA, SINGLE VESSEL  6/26/2024    Procedure: PTCA, Single Vessel;  Surgeon:  Enoch Tirado MD;  Location: [facility] CATH LAB/EP;  Service: Cardiology;;    PTCA, SINGLE VESSEL Left 9/16/2024    Procedure: PTCA, Single Vessel;  Surgeon: Enoch Tirado MD;  Location: [facility] CATH LAB/EP;  Service: Cardiology;  Laterality: Left;    REMOVAL OF HEMODIALYSIS CATHETER Right 2/9/2024    Procedure: REMOVAL, CATHETER, HEMODIALYSIS;  Surgeon: Wang Mendieta MD;  Location: [facility] OR;  Service: General;  Laterality: Right;    REMOVAL OF TUNNELED CENTRAL VENOUS CATHETER (CVC) Right 5/20/2024    Procedure: REMOVAL, CATHETER, CENTRAL VENOUS, TUNNELED;  Surgeon: Scott Pascual MD;  Location: [facility] OR;  Service: General;  Laterality: Right;    REPAIR, CHRONIC TOTAL OCCLUSION, CORONARY  6/26/2024    Procedure: Repair, Chronic Total Occlusion, Coronary;  Surgeon: Enoch Tirado MD;  Location: [facility] CATH LAB/EP;  Service: Cardiology;;    STENT, DRUG ELUTING, SINGLE VESSEL, CORONARY  4/25/2023    Procedure: Stent, Drug Eluting, Single Vessel, Coronary;  Surgeon: Keo Jenkins MD;  Location: [facility] CATH LAB/EP;  Service: Cardiology;;    STENT, DRUG ELUTING, SINGLE VESSEL, CORONARY  5/16/2023    Procedure: Stent, Drug Eluting, Single Vessel, Coronary;  Surgeon: Keo Jenkins MD;  Location: [facility] CATH LAB/EP;  Service: Cardiology;;  CX    TRANSESOPHAGEAL ECHOCARDIOGRAM WITH POSSIBLE CARDIOVERSION (JOSE W/ POSS CARDIOVERSION) N/A 6/19/2023    Procedure: Transesophageal echo (JOSE) intra-procedure log documentation;  Surgeon: Keo Jenkins MD;  Location: [facility] CATH LAB/EP;  Service: Cardiology;  Laterality: N/A;      Family History   Problem Relation Name Age of Onset    Aneurysm Mother      Hypertension Mother      Heart disease Mother      Cancer Father      Diabetes Sister 1     Hypertension Sister 1     No Known Problems Brother 1     No Known Problems Son 1       Social History     Tobacco Use    Smoking status: Former     Current packs/day: 0.00     Types: Cigarettes     Quit date: 4/21/1999  "    Years since quittin.5    Smokeless tobacco: Never   Substance Use Topics    Alcohol use: No     Alcohol/week: 0.0 standard drinks of alcohol        Objective:     Vitals:    24 1036   BP: 122/82   BP Location: Left arm   Patient Position: Sitting   Pulse: 75   SpO2: 98%   Weight: 78.9 kg (173 lb 15.1 oz)   Height: 5' 10" (1.778 m)     BMI: 24.96    Gen: No apparent distress, well nourished and developed, appears stated age  CV: RRR, S1 and S2 present, no LE edema  Resp: CTAB, normal respiratory effort  MSK: Large hematoma on the inferior medial R knee with bruising down the shin. Slightly reduced ROM with flexion of the knee. Mild warmth over hematoma but otherwise no tracking warmth up the thigh.         Assessment/Plan:     Domingo Gross is a 64 y.o. year old male who presents to clinic for R knee pain    1. Traumatic hematoma of right knee, initial encounter (Primary)  Will place urgent referral, anticipate will need hematoma drained. No signs of infection at this time however strict ED precautions provided for signs of infection. Pt verbalized understanding and was in agreement with the plan.    - Ambulatory referral/consult to Orthopedics; Future    2. Hyperparathyroidism  Stable. Recent PTH downtrending. Seeing nephrology for management.      Follow-up: TREVER Bell DO  Family Medicine        "

## 2024-11-11 DIAGNOSIS — M25.561 RIGHT KNEE PAIN, UNSPECIFIED CHRONICITY: Primary | ICD-10-CM

## 2024-11-12 ENCOUNTER — OFFICE VISIT (OUTPATIENT)
Dept: ORTHOPEDICS | Facility: CLINIC | Age: 64
End: 2024-11-12
Payer: COMMERCIAL

## 2024-11-12 ENCOUNTER — HOSPITAL ENCOUNTER (OUTPATIENT)
Dept: RADIOLOGY | Facility: HOSPITAL | Age: 64
Discharge: HOME | End: 2024-11-12
Attending: ORTHOPAEDIC SURGERY
Payer: COMMERCIAL

## 2024-11-12 VITALS — BODY MASS INDEX: 24.9 KG/M2 | WEIGHT: 173.94 LBS | HEIGHT: 70 IN

## 2024-11-12 DIAGNOSIS — M25.561 RIGHT KNEE PAIN, UNSPECIFIED CHRONICITY: ICD-10-CM

## 2024-11-12 DIAGNOSIS — S80.01XA TRAUMATIC HEMATOMA OF RIGHT KNEE, INITIAL ENCOUNTER: ICD-10-CM

## 2024-11-12 PROCEDURE — 73564 X-RAY EXAM KNEE 4 OR MORE: CPT | Mod: TC,PN,RT

## 2024-11-12 PROCEDURE — 3044F HG A1C LEVEL LT 7.0%: CPT | Mod: CPTII,S$GLB,, | Performed by: ORTHOPAEDIC SURGERY

## 2024-11-12 PROCEDURE — 3066F NEPHROPATHY DOC TX: CPT | Mod: CPTII,S$GLB,, | Performed by: ORTHOPAEDIC SURGERY

## 2024-11-12 PROCEDURE — 73564 X-RAY EXAM KNEE 4 OR MORE: CPT | Mod: 26,RT,, | Performed by: RADIOLOGY

## 2024-11-12 PROCEDURE — 99204 OFFICE O/P NEW MOD 45 MIN: CPT | Mod: S$GLB,,, | Performed by: ORTHOPAEDIC SURGERY

## 2024-11-12 PROCEDURE — 99999 PR PBB SHADOW E&M-EST. PATIENT-LVL III: CPT | Mod: PBBFAC,,, | Performed by: ORTHOPAEDIC SURGERY

## 2024-11-12 PROCEDURE — 3062F POS MACROALBUMINURIA REV: CPT | Mod: CPTII,S$GLB,, | Performed by: ORTHOPAEDIC SURGERY

## 2024-11-12 PROCEDURE — 3008F BODY MASS INDEX DOCD: CPT | Mod: CPTII,S$GLB,, | Performed by: ORTHOPAEDIC SURGERY

## 2024-11-12 PROCEDURE — 1159F MED LIST DOCD IN RCRD: CPT | Mod: CPTII,S$GLB,, | Performed by: ORTHOPAEDIC SURGERY

## 2024-11-12 NOTE — PROGRESS NOTES
[facility] [address]          Subjective:     Patient ID: Domingo Gross is a 64 y.o. male.    Chief Complaint: Pain of the Right Knee       Patient ID: Domingo Gross is a 64 y.o. male.    Chief Complaint: Pain of the Right Knee    Patient complaining of right anterior knee pain after fall on stairs directly onto knee 2 and half weeks ago.  Patient reports immediate pain to knee at that time with subsequent development of hematoma over anteromedial knee and ecchymosis over anterior lower leg.  Patient was seen by PCP referred to ortho clinic.  Patient has been ambulating since injury with minimal pain.  Reports main pain to touch.   3 cm hematoma over anterior medial knee unchanged in size per patient.  Ecchymosis over anterior leg improving.  Of note, patient is on Plavix.  Patient denies any mechanical symptoms to knee.  Denies any catching locking or giving way.      Past Medical History:   Diagnosis Date    Acute congestive heart failure 9/13/2024    Allergy     Anemia     Anticoagulant long-term use     Arthritis     CKD (chronic kidney disease) stage 4, GFR 15-29 ml/min     COPD (chronic obstructive pulmonary disease) 08/20/2021    Coronary artery disease     Heart attack 10/04/2019    Hematuria     Hemothorax     Hyperlipidemia     Hypertension     Hyperuricemia     Hypocalcemia     Hypokalemia     Hypophosphatemia     Hypothyroidism 3/2/2023    PAD (peripheral artery disease)     Proteinuria     Vitamin D deficiency         Past Surgical History:   Procedure Laterality Date    ABDOMINAL AORTOGRAPHY N/A 5/10/2019    Procedure: AORTOGRAM-ABDOMINAL;  Surgeon: Keo Jenkins MD;  Location: [facility] CATH LAB/EP;  Service: Cardiology;  Laterality: N/A;  RSFA intervention     AORTOGRAPHY WITH SERIALOGRAPHY N/A 12/3/2021    Procedure: AORTOGRAM, WITH SERIALOGRAPHY;  Surgeon: Keo Jenkins MD;  Location: [facility] CATH LAB/EP;  Service: Cardiology;  Laterality: N/A;    AORTOGRAPHY WITH SERIALOGRAPHY N/A  4/1/2022    Procedure: AORTOGRAM, WITH SERIALOGRAPHY;  Surgeon: Keo Jenkins MD;  Location: [facility] CATH LAB/EP;  Service: Cardiology;  Laterality: N/A;    ATHERECTOMY, CORONARY N/A 4/25/2023    Procedure: Atherectomy-coronary;  Surgeon: Keo Jenkins MD;  Location: [facility] CATH LAB/EP;  Service: Cardiology;  Laterality: N/A;    BONE MARROW BIOPSY Right 10/12/2021    Procedure: BIOPSY-BONE MARROW;  Surgeon: Naseem Woods MD;  Location: [facility] OR;  Service: Oncology;  Laterality: Right;    CARDIAC CATHETERIZATION      CATARACT EXTRACTION      CATARACT EXTRACTION W/  INTRAOCULAR LENS IMPLANT Right 6/8/2020    Procedure: EXTRACTION, CATARACT, WITH IOL INSERTION;  Surgeon: Tin Light MD;  Location: [facility] OR;  Service: Ophthalmology;  Laterality: Right;    CATARACT EXTRACTION W/  INTRAOCULAR LENS IMPLANT Left 7/2/2020    Procedure: EXTRACTION, CATARACT, WITH IOL INSERTION;  Surgeon: Tin Light MD;  Location: [facility];  Service: Ophthalmology;  Laterality: Left;    COLONOSCOPY N/A 1/6/2022    Procedure: COLONOSCOPY;  Surgeon: Kathe Penaloza MD;  Location: [facility] ENDO (2ND FLR);  Service: Endoscopy;  Laterality: N/A;  COVID test at [facility] on 1/3-GT  okay to hold Plavix for 5 days and aspirin per Dr. Becerra  2nd floor due toextensive cardiac history   instructions mailed and my [facility] portal -     CORONARY ANGIOGRAPHY N/A 3/29/2019    Procedure: ANGIOGRAM, CORONARY ARTERY;  Surgeon: Keo Jenkins MD;  Location: [facility] CATH LAB/EP;  Service: Cardiology;  Laterality: N/A;    CORONARY ANGIOGRAPHY Left 10/11/2019    Procedure: ANGIOGRAM, CORONARY ARTERY;  Surgeon: Keo Jenkins MD;  Location: [facility] CATH LAB/EP;  Service: Cardiology;  Laterality: Left;    CORONARY ANGIOGRAPHY N/A 4/10/2023    Procedure: ANGIOGRAM, CORONARY ARTERY;  Surgeon: Keo Jenkins MD;  Location: [facility] CATH LAB/EP;  Service: Cardiology;  Laterality: N/A;    CORONARY ANGIOGRAPHY N/A 6/26/2024    Procedure: ANGIOGRAM, CORONARY ARTERY;  Surgeon:  Enoch Tirado MD;  Location: [facility] CATH LAB/EP;  Service: Cardiology;  Laterality: N/A;    CORONARY ANGIOGRAPHY N/A 9/16/2024    Procedure: ANGIOGRAM, CORONARY ARTERY;  Surgeon: Enoch Tirado MD;  Location: [facility] CATH LAB/EP;  Service: Cardiology;  Laterality: N/A;    CORONARY ANGIOPLASTY WITH STENT PLACEMENT  03/29/2019    mid and distal RCA    ESOPHAGOGASTRODUODENOSCOPY N/A 1/6/2022    Procedure: EGD (ESOPHAGOGASTRODUODENOSCOPY);  Surgeon: Kathe Penaloza MD;  [facility]  Service: Endoscopy;  Laterality: N/A;    EYE SURGERY      INSERTION OF TUNNELED CENTRAL VENOUS HEMODIALYSIS CATHETER N/A 2/9/2024    Procedure: INSERTION, CATHETER, HEMODIALYSIS, DUAL LUMEN;  Surgeon: Wang Mendieta MD;  Location: [facility] OR;  Service: General;  Laterality: N/A;    INSTANTANEOUS WAVE-FREE RATIO (IFR) N/A 4/25/2023    Procedure: Instantaneous Wave-Free Ratio (IFR);  Surgeon: Keo Jenkins MD;  Location: [facility] CATH LAB/EP;  Service: Cardiology;  Laterality: N/A;    INTRAVASCULAR ULTRASOUND, NON-CORONARY  8/12/2022    Procedure: Intravascular Ultrasound, Non-Coronary;  Surgeon: Keo Jenkins MD;  Location: [facility] CATH LAB/EP;  Service: Cardiology;;    IVUS, CORONARY  4/25/2023    Procedure: IVUS, Coronary;  Surgeon: Keo Jenkins MD;  Location: [facility] CATH LAB/EP;  Service: Cardiology;;    IVUS, CORONARY  5/16/2023    Procedure: IVUS, Coronary;  Surgeon: Keo Jenkins MD;  Location: [facility] CATH LAB/EP;  Service: Cardiology;;  CX    LEFT HEART CATHETERIZATION N/A 3/29/2019    Procedure: Left heart cath;  Surgeon: Keo Jenkins MD;  Location: [facility] CATH LAB/EP;  Service: Cardiology;  Laterality: N/A;    LEFT HEART CATHETERIZATION Left 10/8/2019    Procedure: Left heart cath;  Surgeon: Keo Jenkins MD;  Location: [facility] CATH LAB/EP;  Service: Cardiology;  Laterality: Left;    LEFT HEART CATHETERIZATION Left 4/10/2023    Procedure: Left heart cath;  Surgeon: Keo Jenkins MD;  Location: [facility] CATH  LAB/EP;  Service: Cardiology;  Laterality: Left;    LEFT HEART CATHETERIZATION Left 4/25/2023    Procedure: Left heart cath;  Surgeon: Keo Jenkins MD;  Location: [facility] CATH LAB/EP;  Service: Cardiology;  Laterality: Left;    LEFT HEART CATHETERIZATION Left 5/16/2023    Procedure: Left heart cath;  Surgeon: Keo Jenkins MD;  Location: [facility] CATH LAB/EP;  Service: Cardiology;  Laterality: Left;    LEFT HEART CATHETERIZATION Left 6/26/2024    Procedure: Left heart cath;  Surgeon: Enoch Tirado MD;  Location: [facility] CATH LAB/EP;  Service: Cardiology;  Laterality: Left;    LEFT HEART CATHETERIZATION Left 9/16/2024    Procedure: Left heart cath;  Surgeon: Enoch Tirado MD;  Location: [facility] CATH LAB/EP;  Service: Cardiology;  Laterality: Left;    LITHOTRIPSY, CORONARY TRANSLUMINAL, PERCUTANEOUS  9/16/2024    Procedure: LITHOTRIPSY, CORONARY TRANSLUMINAL, PERCUTANEOUS;  Surgeon: Enoch Tirado MD;  Location: [facility] CATH LAB/EP;  Service: Cardiology;;    PERCUTANEOUS CORONARY INTERVENTION, ARTERY N/A 6/26/2024    Procedure: Percutaneous coronary intervention;  Surgeon: Enoch Tirado MD;  Location: [facility] CATH LAB/EP;  Service: Cardiology;  Laterality: N/A;    PERCUTANEOUS TRANSLUMINAL ANGIOPLASTY (PTA) OF PERIPHERAL VESSEL N/A 7/12/2019    Procedure: PTA, PERIPHERAL VESSEL;  Surgeon: Keo Jenkins MD;  Location: [facility] CATH LAB/EP;  Service: Cardiology;  Laterality: N/A;    PERCUTANEOUS TRANSLUMINAL ANGIOPLASTY (PTA) OF PERIPHERAL VESSEL Left 5/20/2022    Procedure: PTA, PERIPHERAL VESSEL;  Surgeon: Keo Jenkins MD;  Location: [facility] CATH LAB/EP;  Service: Cardiology;  Laterality: Left;    PERCUTANEOUS TRANSLUMINAL ANGIOPLASTY (PTA) OF PERIPHERAL VESSEL Right 8/12/2022    Procedure: PTA, PERIPHERAL VESSEL;  Surgeon: Keo Jenkins MD;  Location: [facility] CATH LAB/EP;  Service: Cardiology;  Laterality: Right;    PERCUTANEOUS TRANSLUMINAL BALLOON ANGIOPLASTY OF CORONARY ARTERY  4/25/2023     Procedure: Angioplasty-coronary;  Surgeon: Keo Jenkins MD;  Location: [facility] CATH LAB/EP;  Service: Cardiology;;    PLACEMENT OF ARTERIOVENOUS GRAFT Left 4/15/2024    Procedure: INSERTION, GRAFT, ARTERIOVENOUS;  Surgeon: Scott Pascual MD;  Location: [facility] OR;  Service: General;  Laterality: Left;    PTCA, SINGLE VESSEL  5/16/2023    Procedure: PTCA, Single Vessel;  Surgeon: Keo Jenkins MD;  Location: [facility] CATH LAB/EP;  Service: Cardiology;;  CX    PTCA, SINGLE VESSEL  6/26/2024    Procedure: PTCA, Single Vessel;  Surgeon: Enoch Tirado MD;  Location: [facility] CATH LAB/EP;  Service: Cardiology;;    PTCA, SINGLE VESSEL Left 9/16/2024    Procedure: PTCA, Single Vessel;  Surgeon: Enoch Tirado MD;  Location: [facility] CATH LAB/EP;  Service: Cardiology;  Laterality: Left;    REMOVAL OF HEMODIALYSIS CATHETER Right 2/9/2024    Procedure: REMOVAL, CATHETER, HEMODIALYSIS;  Surgeon: Wang Mendieta MD;  Location: [facility] OR;  Service: General;  Laterality: Right;    REMOVAL OF TUNNELED CENTRAL VENOUS CATHETER (CVC) Right 5/20/2024    Procedure: REMOVAL, CATHETER, CENTRAL VENOUS, TUNNELED;  Surgeon: Scott Pascual MD;  Location: [facility];  Service: General;  Laterality: Right;    REPAIR, CHRONIC TOTAL OCCLUSION, CORONARY  6/26/2024    Procedure: Repair, Chronic Total Occlusion, Coronary;  Surgeon: Enoch Tirado MD;  Location: [facility] CATH LAB/EP;  Service: Cardiology;;    STENT, DRUG ELUTING, SINGLE VESSEL, CORONARY  4/25/2023    Procedure: Stent, Drug Eluting, Single Vessel, Coronary;  Surgeon: Keo Jenkins MD;  Location: [facility] CATH LAB/EP;  Service: Cardiology;;    STENT, DRUG ELUTING, SINGLE VESSEL, CORONARY  5/16/2023    Procedure: Stent, Drug Eluting, Single Vessel, Coronary;  Surgeon: Keo Jenkins MD;  Location: [facility] CATH LAB/EP;  Service: Cardiology;;  CX    TRANSESOPHAGEAL ECHOCARDIOGRAM WITH POSSIBLE CARDIOVERSION (JOSE W/ POSS CARDIOVERSION) N/A 6/19/2023    Procedure:  Transesophageal echo (JOSE) intra-procedure log documentation;  Surgeon: Keo Jenkins MD;  Location: [facility] CATH LAB/EP;  Service: Cardiology;  Laterality: N/A;        Current Outpatient Medications   Medication Instructions    albuterol-ipratropium (DUO-NEB) 2.5 mg-0.5 mg/3 mL nebulizer solution Use 1 vial by nebulization every 6 (six) hours as needed for Wheezing or Shortness of Breath (or cough). Rescue    amLODIPine (NORVASC) 5 mg, Oral, Daily    apixaban (ELIQUIS) 5 mg, Oral, 2 times daily    carvediloL (COREG) 25 mg, Oral, 2 times daily with meals    clopidogreL (PLAVIX) 75 mg, Oral, Daily    coenzyme Q10 100 mg, Oral, Daily    eplerenone (INSPRA) 50 mg, Oral, Daily    furosemide (LASIX) 80 mg, Oral, Every other day, Non dialysis days only <BR>Monday, Wednesday, Friday ,     hydrALAZINE (APRESOLINE) 50 mg, Oral, Every 8 hours    hydrOXYzine pamoate (VISTARIL) 25 mg, Oral, Nightly PRN    isosorbide mononitrate (IMDUR) 30 mg, Oral, Daily    levothyroxine (SYNTHROID) 75 mcg, Oral, Before breakfast    nitroGLYCERIN (NITROSTAT) 0.4 mg, Sublingual, Every 5 min PRN    ranolazine (RANEXA) 1,000 mg, Oral, 2 times daily    rosuvastatin (CRESTOR) 40 mg, Oral, Nightly        Review of patient's allergies indicates:   Allergen Reactions    Ace inhibitors Rash       Social History     Socioeconomic History    Marital status:    Occupational History     Employer: [employer]   Tobacco Use    Smoking status: Former     Current packs/day: 0.00     Types: Cigarettes     Quit date: 1999     Years since quittin.5    Smokeless tobacco: Never   Substance and Sexual Activity    Alcohol use: No     Alcohol/week: 0.0 standard drinks of alcohol    Drug use: No    Sexual activity: Yes     Partners: Female     Social Drivers of Health     Financial Resource Strain: Low Risk  (2024)    Overall Financial Resource Strain (CARDIA)     Difficulty of Paying Living Expenses: Not hard at all   Recent Concern:  Financial Resource Strain - Medium Risk (9/10/2024)    Overall Financial Resource Strain (CARDIA)     Difficulty of Paying Living Expenses: Somewhat hard   Food Insecurity: No Food Insecurity (9/16/2024)    Hunger Vital Sign     Worried About Running Out of Food in the Last Year: Never true     Ran Out of Food in the Last Year: Never true   Transportation Needs: No Transportation Needs (9/16/2024)    TRANSPORTATION NEEDS     Transportation : No   Physical Activity: Patient Unable To Answer (9/16/2024)    Exercise Vital Sign     Days of Exercise per Week: Patient unable to answer     Minutes of Exercise per Session: Patient unable to answer   Recent Concern: Physical Activity - Inactive (9/10/2024)    Exercise Vital Sign     Days of Exercise per Week: 0 days     Minutes of Exercise per Session: 0 min   Stress: Patient Unable To Answer (9/16/2024)    [employer] - Occupational Stress Questionnaire     Feeling of Stress : Patient unable to answer   Housing Stability: Low Risk  (9/16/2024)    Housing Stability Vital Sign     Unable to Pay for Housing in the Last Year: No     Homeless in the Last Year: No   Recent Concern: Housing Stability - High Risk (9/10/2024)    Housing Stability Vital Sign     Unable to Pay for Housing in the Last Year: Yes     Homeless in the Last Year: No       Family History   Problem Relation Name Age of Onset    Aneurysm Mother      Hypertension Mother      Heart disease Mother      Cancer Father      Diabetes Sister 1     Hypertension Sister 1     No Known Problems Brother 1     No Known Problems Son 1          Review of systems negative except for noted in HPI    Objective:   Physical Exam:     Right knee  Skin atraumatic   3 cm hematoma over anterior medial knee, no surrounding fluctuance or erythema, no concern for infection  Scattered ecchymosis over anterior lower leg, improving per patient  mild effusion  Tender over anterior knee and mild over lower leg,  tender over hematoma over injury zoe medial knee  ROM 0-110, limited by anterior knee pain with deeper flexion  Stable anterior/posterior   Stable varus/valgus  No groin pain with rotation of hip  Grossly NVI distally    Imaging:   X-Ray knee reviewed, KL 2 changes with joint space narrowing.  No fractures.       Assessment:        Domingo Gross is a 64 y.o. male    Encounter Diagnosis   Name Primary?    Traumatic hematoma of right knee, initial encounter        Plan :  Reassured the patient of no fracture seen on x-ray and stable ligamentous exam.  Explained to patient that hematoma and ecchymosis will improve with time but likely will be delayed secondary to patient being on blood thinners.   Patient can follow up as needed, if patient continues to pain or any mechanical symptoms a month or 2 from now, patient can follow up and we can obtain MRI          Sawyer Yi MD  [facility] PGY-3

## 2024-12-16 ENCOUNTER — OFFICE VISIT (OUTPATIENT)
Dept: FAMILY MEDICINE | Facility: CLINIC | Age: 64
End: 2024-12-16
Payer: COMMERCIAL

## 2024-12-16 VITALS
DIASTOLIC BLOOD PRESSURE: 60 MMHG | WEIGHT: 179 LBS | BODY MASS INDEX: 25.62 KG/M2 | OXYGEN SATURATION: 96 % | HEART RATE: 72 BPM | HEIGHT: 70 IN | SYSTOLIC BLOOD PRESSURE: 124 MMHG

## 2024-12-16 DIAGNOSIS — G47.00 INSOMNIA, UNSPECIFIED TYPE: ICD-10-CM

## 2024-12-16 DIAGNOSIS — I15.0 RENOVASCULAR HYPERTENSION: Primary | ICD-10-CM

## 2024-12-16 PROCEDURE — 1160F RVW MEDS BY RX/DR IN RCRD: CPT | Mod: CPTII,S$GLB,, | Performed by: STUDENT IN AN ORGANIZED HEALTH CARE EDUCATION/TRAINING PROGRAM

## 2024-12-16 PROCEDURE — 3044F HG A1C LEVEL LT 7.0%: CPT | Mod: CPTII,S$GLB,, | Performed by: STUDENT IN AN ORGANIZED HEALTH CARE EDUCATION/TRAINING PROGRAM

## 2024-12-16 PROCEDURE — 3062F POS MACROALBUMINURIA REV: CPT | Mod: CPTII,S$GLB,, | Performed by: STUDENT IN AN ORGANIZED HEALTH CARE EDUCATION/TRAINING PROGRAM

## 2024-12-16 PROCEDURE — 3066F NEPHROPATHY DOC TX: CPT | Mod: CPTII,S$GLB,, | Performed by: STUDENT IN AN ORGANIZED HEALTH CARE EDUCATION/TRAINING PROGRAM

## 2024-12-16 PROCEDURE — G2211 COMPLEX E/M VISIT ADD ON: HCPCS | Mod: S$GLB,,, | Performed by: STUDENT IN AN ORGANIZED HEALTH CARE EDUCATION/TRAINING PROGRAM

## 2024-12-16 PROCEDURE — 99999 PR PBB SHADOW E&M-EST. PATIENT-LVL IV: CPT | Mod: PBBFAC,,, | Performed by: STUDENT IN AN ORGANIZED HEALTH CARE EDUCATION/TRAINING PROGRAM

## 2024-12-16 PROCEDURE — 3008F BODY MASS INDEX DOCD: CPT | Mod: CPTII,S$GLB,, | Performed by: STUDENT IN AN ORGANIZED HEALTH CARE EDUCATION/TRAINING PROGRAM

## 2024-12-16 PROCEDURE — 3074F SYST BP LT 130 MM HG: CPT | Mod: CPTII,S$GLB,, | Performed by: STUDENT IN AN ORGANIZED HEALTH CARE EDUCATION/TRAINING PROGRAM

## 2024-12-16 PROCEDURE — 99213 OFFICE O/P EST LOW 20 MIN: CPT | Mod: S$GLB,,, | Performed by: STUDENT IN AN ORGANIZED HEALTH CARE EDUCATION/TRAINING PROGRAM

## 2024-12-16 PROCEDURE — 1159F MED LIST DOCD IN RCRD: CPT | Mod: CPTII,S$GLB,, | Performed by: STUDENT IN AN ORGANIZED HEALTH CARE EDUCATION/TRAINING PROGRAM

## 2024-12-16 PROCEDURE — 3078F DIAST BP <80 MM HG: CPT | Mod: CPTII,S$GLB,, | Performed by: STUDENT IN AN ORGANIZED HEALTH CARE EDUCATION/TRAINING PROGRAM

## 2024-12-16 RX ORDER — CEPHALEXIN 250 MG/1
250 CAPSULE ORAL
COMMUNITY
Start: 2024-11-27

## 2024-12-16 NOTE — PROGRESS NOTES
Progress Note  [facility]    Subjective:     Domingo Gross is a 64 y.o. year old male with current diagnoses of DDD, closed fracture of tranverse process of lumbar vertebra, nuclear sclerosis, COPD, HLD, HTN, PVD, CAD, carotid artery stenosis, PAF, s/p AV graft placement with ESRD on HD TThS, anemia due to CKD, hypothyroidism  who presents to clinic for f/u    HTN: Doing well on amlodipine, hydralazine, imdur. Seeing cardiology and nephrology.     Insomina: Using vistrail avotu 3x per week, works well for him.    Patient Active Problem List    Diagnosis Date Noted    Traumatic hematoma of right knee 11/12/2024    Hyperparathyroidism 11/05/2024    S/P drug eluting coronary stent placement 09/14/2024    Atherosclerosis of native coronary artery of native heart with unstable angina pectoris 09/14/2024    Acute congestive heart failure 09/13/2024    Pulmonary edema 09/09/2024    Anticoagulated 07/01/2024    Hemodialysis catheter dysfunction 05/16/2024    S/P arteriovenous (AV) graft placement 04/23/2024    Membranous nephropathy determined by biopsy 02/20/2024    Closed fracture of transverse process of lumbar vertebra 02/16/2024    ESRD (end stage renal disease) on dialysis 02/16/2024    Fall 02/16/2024    EBV infection 01/31/2024    CMV (cytomegalovirus infection) 01/31/2024    Paroxysmal atrial fibrillation 08/24/2023    Hypothyroidism 03/02/2023    Unstable angina 03/02/2023    Anemia due to chronic kidney disease, on chronic dialysis 12/15/2021    COPD exacerbation 08/20/2021    Nuclear sclerosis, bilateral 06/08/2020    Nephrotic range proteinuria 04/06/2020    Bilateral carotid artery stenosis     Coronary artery disease 03/29/2019    Venous insufficiency of both lower extremities 06/04/2018    Atherosclerosis of native artery of both lower extremities with intermittent claudication 03/02/2018    Hyperlipidemia 06/12/2015    DJD (degenerative joint disease), lumbar  05/27/2015    Claudication in peripheral vascular disease 06/13/2014    PAD (peripheral artery disease) 05/21/2014    HTN (hypertension) 04/29/2013        Review of patient's allergies indicates:   Allergen Reactions    Ace inhibitors Rash        Past Medical History:   Diagnosis Date    Acute congestive heart failure 9/13/2024    Allergy     Anemia     Anticoagulant long-term use     Arthritis     CKD (chronic kidney disease) stage 4, GFR 15-29 ml/min     COPD (chronic obstructive pulmonary disease) 08/20/2021    Coronary artery disease     Heart attack 10/04/2019    Hematuria     Hemothorax     Hyperlipidemia     Hypertension     Hyperuricemia     Hypocalcemia     Hypokalemia     Hypophosphatemia     Hypothyroidism 3/2/2023    PAD (peripheral artery disease)     Proteinuria     Vitamin D deficiency       Past Surgical History:   Procedure Laterality Date    ABDOMINAL AORTOGRAPHY N/A 5/10/2019    Procedure: AORTOGRAM-ABDOMINAL;  Surgeon: Keo Jenkins MD;  Location: [facility] CATH LAB/EP;  Service: Cardiology;  Laterality: N/A;  RSFA intervention     AORTOGRAPHY WITH SERIALOGRAPHY N/A 12/3/2021    Procedure: AORTOGRAM, WITH SERIALOGRAPHY;  Surgeon: Keo Jenkins MD;  Location: [facility] CATH LAB/EP;  Service: Cardiology;  Laterality: N/A;    AORTOGRAPHY WITH SERIALOGRAPHY N/A 4/1/2022    Procedure: AORTOGRAM, WITH SERIALOGRAPHY;  Surgeon: Keo Jenkins MD;  Location: [facility] CATH LAB/EP;  Service: Cardiology;  Laterality: N/A;    ATHERECTOMY, CORONARY N/A 4/25/2023    Procedure: Atherectomy-coronary;  Surgeon: Keo Jenkins MD;  Location: [facility] CATH LAB/EP;  Service: Cardiology;  Laterality: N/A;    BONE MARROW BIOPSY Right 10/12/2021    Procedure: BIOPSY-BONE MARROW;  Surgeon: Naseem Woods MD;  Location: [facility] OR;  Service: Oncology;  Laterality: Right;    CARDIAC CATHETERIZATION      CATARACT EXTRACTION      CATARACT EXTRACTION W/  INTRAOCULAR LENS IMPLANT Right 6/8/2020    Procedure: EXTRACTION, CATARACT, WITH IOL  INSERTION;  Surgeon: Tin Light MD;  Location: [facility] OR;  Service: Ophthalmology;  Laterality: Right;    CATARACT EXTRACTION W/  INTRAOCULAR LENS IMPLANT Left 7/2/2020    Procedure: EXTRACTION, CATARACT, WITH IOL INSERTION;  Surgeon: Tin Light MD;  Location: [facility] OR;  Service: Ophthalmology;  Laterality: Left;    COLONOSCOPY N/A 1/6/2022    Procedure: COLONOSCOPY;  Surgeon: Kathe Penaloza MD;  Location: [facility] (2ND FLR);  Service: Endoscopy;  Laterality: N/A;  COVID test at [facility] on 1/3-GT  okay to hold Plavix for 5 days and aspirin per Dr. Becerra  2nd floor due toextensive cardiac history   instructions mailed and my [facility] portal -     CORONARY ANGIOGRAPHY N/A 3/29/2019    Procedure: ANGIOGRAM, CORONARY ARTERY;  Surgeon: Keo Jenkins MD;  Location: [facility] CATH LAB/EP;  Service: Cardiology;  Laterality: N/A;    CORONARY ANGIOGRAPHY Left 10/11/2019    Procedure: ANGIOGRAM, CORONARY ARTERY;  Surgeon: Keo Jenkins MD;  Location: [facility] CATH LAB/EP;  Service: Cardiology;  Laterality: Left;    CORONARY ANGIOGRAPHY N/A 4/10/2023    Procedure: ANGIOGRAM, CORONARY ARTERY;  Surgeon: Keo Jenkins MD;  Location: [facility] CATH LAB/EP;  Service: Cardiology;  Laterality: N/A;    CORONARY ANGIOGRAPHY N/A 6/26/2024    Procedure: ANGIOGRAM, CORONARY ARTERY;  Surgeon: Enoch Tirado MD;  Location: [facility] CATH LAB/EP;  Service: Cardiology;  Laterality: N/A;    CORONARY ANGIOGRAPHY N/A 9/16/2024    Procedure: ANGIOGRAM, CORONARY ARTERY;  Surgeon: Enoch Tirado MD;  Location: [facility] CATH LAB/EP;  Service: Cardiology;  Laterality: N/A;    CORONARY ANGIOPLASTY WITH STENT PLACEMENT  03/29/2019    mid and distal RCA    ESOPHAGOGASTRODUODENOSCOPY N/A 1/6/2022    Procedure: EGD (ESOPHAGOGASTRODUODENOSCOPY);  Surgeon: Kathe Penaloza MD;  Location: [facility] ENDO (2ND FLR);  Service: Endoscopy;  Laterality: N/A;    EYE SURGERY      INSERTION OF TUNNELED CENTRAL VENOUS HEMODIALYSIS CATHETER N/A 2/9/2024    Procedure:  INSERTION, CATHETER, HEMODIALYSIS, DUAL LUMEN;  Surgeon: Wang Mendieta MD;  Location: [facility] OR;  Service: General;  Laterality: N/A;    INSTANTANEOUS WAVE-FREE RATIO (IFR) N/A 4/25/2023    Procedure: Instantaneous Wave-Free Ratio (IFR);  Surgeon: Keo Jenkins MD;  Location: [facility] CATH LAB/EP;  Service: Cardiology;  Laterality: N/A;    INTRAVASCULAR ULTRASOUND, NON-CORONARY  8/12/2022    Procedure: Intravascular Ultrasound, Non-Coronary;  Surgeon: Keo Jenkins MD;  Location: [facility] CATH LAB/EP;  Service: Cardiology;;    IVUS, CORONARY  4/25/2023    Procedure: IVUS, Coronary;  Surgeon: Keo Jenkins MD;  Location: [facility] CATH LAB/EP;  Service: Cardiology;;    IVUS, CORONARY  5/16/2023    Procedure: IVUS, Coronary;  Surgeon: Keo Jenkins MD;  Location: [facility] CATH LAB/EP;  Service: Cardiology;;  CX    LEFT HEART CATHETERIZATION N/A 3/29/2019    Procedure: Left heart cath;  Surgeon: Keo Jenkins MD;  Location: [facility] CATH LAB/EP;  Service: Cardiology;  Laterality: N/A;    LEFT HEART CATHETERIZATION Left 10/8/2019    Procedure: Left heart cath;  Surgeon: Keo Jenkins MD;  Location: [facility] CATH LAB/EP;  Service: Cardiology;  Laterality: Left;    LEFT HEART CATHETERIZATION Left 4/10/2023    Procedure: Left heart cath;  Surgeon: Keo Jenkins MD;  Location: [facility] CATH LAB/EP;  Service: Cardiology;  Laterality: Left;    LEFT HEART CATHETERIZATION Left 4/25/2023    Procedure: Left heart cath;  Surgeon: Keo Jenkins MD;  Location: [facility] CATH LAB/EP;  Service: Cardiology;  Laterality: Left;    LEFT HEART CATHETERIZATION Left 5/16/2023    Procedure: Left heart cath;  Surgeon: Keo Jenkins MD;  Location: [facility] CATH LAB/EP;  Service: Cardiology;  Laterality: Left;    LEFT HEART CATHETERIZATION Left 6/26/2024    Procedure: Left heart cath;  Surgeon: Enoch Tirado MD;  Location: [facility] CATH LAB/EP;  Service: Cardiology;  Laterality: Left;    LEFT HEART CATHETERIZATION Left 9/16/2024    Procedure: Left heart cath;   Surgeon: Enoch Tirado MD;  Location: [facility] CATH LAB/EP;  Service: Cardiology;  Laterality: Left;    LITHOTRIPSY, CORONARY TRANSLUMINAL, PERCUTANEOUS  9/16/2024    Procedure: LITHOTRIPSY, CORONARY TRANSLUMINAL, PERCUTANEOUS;  Surgeon: Enoch Tirado MD;  Location: [facility] CATH LAB/EP;  Service: Cardiology;;    PERCUTANEOUS CORONARY INTERVENTION, ARTERY N/A 6/26/2024    Procedure: Percutaneous coronary intervention;  Surgeon: Enoch Tirado MD;  Location: [facility] CATH LAB/EP;  Service: Cardiology;  Laterality: N/A;    PERCUTANEOUS TRANSLUMINAL ANGIOPLASTY (PTA) OF PERIPHERAL VESSEL N/A 7/12/2019    Procedure: PTA, PERIPHERAL VESSEL;  Surgeon: Keo Jenkins MD;  Location: [facility] CATH LAB/EP;  Service: Cardiology;  Laterality: N/A;    PERCUTANEOUS TRANSLUMINAL ANGIOPLASTY (PTA) OF PERIPHERAL VESSEL Left 5/20/2022    Procedure: PTA, PERIPHERAL VESSEL;  Surgeon: Keo Jenkins MD;  Location: [facility] CATH LAB/EP;  Service: Cardiology;  Laterality: Left;    PERCUTANEOUS TRANSLUMINAL ANGIOPLASTY (PTA) OF PERIPHERAL VESSEL Right 8/12/2022    Procedure: PTA, PERIPHERAL VESSEL;  Surgeon: Keo Jenkins MD;  Location: [facility] CATH LAB/EP;  Service: Cardiology;  Laterality: Right;    PERCUTANEOUS TRANSLUMINAL BALLOON ANGIOPLASTY OF CORONARY ARTERY  4/25/2023    Procedure: Angioplasty-coronary;  Surgeon: Keo Jenkins MD;  Location: [facility] CATH LAB/EP;  Service: Cardiology;;    PLACEMENT OF ARTERIOVENOUS GRAFT Left 4/15/2024    Procedure: INSERTION, GRAFT, ARTERIOVENOUS;  Surgeon: Scott Pascual MD;  Location: [facility] OR;  Service: General;  Laterality: Left;    PTCA, SINGLE VESSEL  5/16/2023    Procedure: PTCA, Single Vessel;  Surgeon: Keo Jenkins MD;  Location: [facility] CATH LAB/EP;  Service: Cardiology;;  CX    PTCA, SINGLE VESSEL  6/26/2024    Procedure: PTCA, Single Vessel;  Surgeon: Enoch Tirado MD;  Location: [facility] CATH LAB/EP;  Service: Cardiology;;    PTCA, SINGLE VESSEL Left 9/16/2024     Procedure: PTCA, Single Vessel;  Surgeon: Enoch Tirado MD;  Location: [facility] CATH LAB/EP;  Service: Cardiology;  Laterality: Left;    REMOVAL OF HEMODIALYSIS CATHETER Right 2024    Procedure: REMOVAL, CATHETER, HEMODIALYSIS;  Surgeon: Wang Mendieta MD;  Location: [facility] OR;  Service: General;  Laterality: Right;    REMOVAL OF TUNNELED CENTRAL VENOUS CATHETER (CVC) Right 2024    Procedure: REMOVAL, CATHETER, CENTRAL VENOUS, TUNNELED;  Surgeon: Scott Pascual MD;  Location: [facility] OR;  Service: General;  Laterality: Right;    REPAIR, CHRONIC TOTAL OCCLUSION, CORONARY  2024    Procedure: Repair, Chronic Total Occlusion, Coronary;  Surgeon: Enoch Tirado MD;  Location: [facility] CATH LAB/EP;  Service: Cardiology;;    STENT, DRUG ELUTING, SINGLE VESSEL, CORONARY  2023    Procedure: Stent, Drug Eluting, Single Vessel, Coronary;  Surgeon: Keo Jenkins MD;  Location: [facility] CATH LAB/EP;  Service: Cardiology;;    STENT, DRUG ELUTING, SINGLE VESSEL, CORONARY  2023    Procedure: Stent, Drug Eluting, Single Vessel, Coronary;  Surgeon: Keo Jenkins MD;  Location: [facility] CATH LAB/EP;  Service: Cardiology;;  CX    TRANSESOPHAGEAL ECHOCARDIOGRAM WITH POSSIBLE CARDIOVERSION (JOSE W/ POSS CARDIOVERSION) N/A 2023    Procedure: Transesophageal echo (JOSE) intra-procedure log documentation;  Surgeon: Keo Jenkins MD;  Location: [facility] CATH LAB/EP;  Service: Cardiology;  Laterality: N/A;      Family History   Problem Relation Name Age of Onset    Aneurysm Mother      Hypertension Mother      Heart disease Mother      Cancer Father      Diabetes Sister 1     Hypertension Sister 1     No Known Problems Brother 1     No Known Problems Son 1       Social History     Tobacco Use    Smoking status: Former     Current packs/day: 0.00     Types: Cigarettes     Quit date: 1999     Years since quittin.6     Passive exposure: Never    Smokeless tobacco: Never   Substance Use Topics    Alcohol  "use: No     Alcohol/week: 0.0 standard drinks of alcohol        Objective:     Vitals:    12/16/24 1319   BP: 124/60   BP Location: Right arm   Patient Position: Sitting   Pulse: 72   SpO2: 96%   Weight: 81.2 kg (179 lb 0.2 oz)   Height: 5' 10" (1.778 m)     BMI: 25.69    Gen: No apparent distress, well nourished and developed, appears stated age  CV: irregular rhythm, S1 and S2 present, murmur present, no LE edema  Resp: CTAB, normal respiratory effort      Assessment/Plan:     Domingo Gross is a 64 y.o. year old male who presents to clinic for f/u    1. Renovascular hypertension (Primary)  Well controlled on current regimen.  Should continue to see nephro and cardiology.     2. Insomnia, unspecified type  Well controlled on current regimen.       Follow-up: 6 months for routine healthcare      Perez Bell DO  Family Medicine        "

## 2024-12-24 ENCOUNTER — PATIENT MESSAGE (OUTPATIENT)
Dept: FAMILY MEDICINE | Facility: CLINIC | Age: 64
End: 2024-12-24
Payer: COMMERCIAL

## 2025-01-01 ENCOUNTER — ANESTHESIA (OUTPATIENT)
Dept: INTENSIVE CARE | Facility: HOSPITAL | Age: 65
DRG: 252 | End: 2025-01-01
Payer: COMMERCIAL

## 2025-01-01 ENCOUNTER — HOSPITAL ENCOUNTER (INPATIENT)
Facility: HOSPITAL | Age: 65
LOS: 17 days | DRG: 252 | End: 2025-06-24
Attending: EMERGENCY MEDICINE | Admitting: STUDENT IN AN ORGANIZED HEALTH CARE EDUCATION/TRAINING PROGRAM
Payer: COMMERCIAL

## 2025-01-01 ENCOUNTER — ANESTHESIA EVENT (OUTPATIENT)
Dept: INTENSIVE CARE | Facility: HOSPITAL | Age: 65
DRG: 252 | End: 2025-01-01
Payer: COMMERCIAL

## 2025-01-01 ENCOUNTER — ANESTHESIA EVENT (OUTPATIENT)
Dept: CARDIOLOGY | Facility: HOSPITAL | Age: 65
DRG: 252 | End: 2025-01-01
Payer: COMMERCIAL

## 2025-01-01 ENCOUNTER — ANESTHESIA (OUTPATIENT)
Dept: CARDIOLOGY | Facility: HOSPITAL | Age: 65
DRG: 252 | End: 2025-01-01
Payer: COMMERCIAL

## 2025-01-01 ENCOUNTER — PATIENT MESSAGE (OUTPATIENT)
Dept: CARDIOLOGY | Facility: CLINIC | Age: 65
End: 2025-01-01
Payer: COMMERCIAL

## 2025-01-01 VITALS
OXYGEN SATURATION: 100 % | WEIGHT: 167 LBS | BODY MASS INDEX: 23.91 KG/M2 | SYSTOLIC BLOOD PRESSURE: 68 MMHG | HEIGHT: 70 IN | TEMPERATURE: 94 F | DIASTOLIC BLOOD PRESSURE: 51 MMHG | RESPIRATION RATE: 12 BRPM

## 2025-01-01 DIAGNOSIS — G93.1 ANOXIC ENCEPHALOPATHY DUE TO CARDIAC ARREST: ICD-10-CM

## 2025-01-01 DIAGNOSIS — R07.9 CHEST PAIN: ICD-10-CM

## 2025-01-01 DIAGNOSIS — N18.6 END STAGE CHRONIC KIDNEY DISEASE: ICD-10-CM

## 2025-01-01 DIAGNOSIS — E87.5 HYPERKALEMIA: ICD-10-CM

## 2025-01-01 DIAGNOSIS — I46.9 CARDIAC ARREST: ICD-10-CM

## 2025-01-01 DIAGNOSIS — N18.6 ESRD (END STAGE RENAL DISEASE): ICD-10-CM

## 2025-01-01 DIAGNOSIS — I20.0 UNSTABLE ANGINA PECTORIS: ICD-10-CM

## 2025-01-01 DIAGNOSIS — R79.89 ELEVATED TROPONIN: ICD-10-CM

## 2025-01-01 DIAGNOSIS — R06.02 SHORTNESS OF BREATH: ICD-10-CM

## 2025-01-01 DIAGNOSIS — R94.31 PROLONGED Q-T INTERVAL ON ECG: ICD-10-CM

## 2025-01-01 DIAGNOSIS — J96.01 ACUTE HYPOXEMIC RESPIRATORY FAILURE: ICD-10-CM

## 2025-01-01 DIAGNOSIS — I21.4 NSTEMI (NON-ST ELEVATED MYOCARDIAL INFARCTION): Primary | ICD-10-CM

## 2025-01-01 DIAGNOSIS — R57.9 SHOCK: ICD-10-CM

## 2025-01-01 DIAGNOSIS — I46.9 ANOXIC ENCEPHALOPATHY DUE TO CARDIAC ARREST: ICD-10-CM

## 2025-01-01 DIAGNOSIS — I50.20 HFREF (HEART FAILURE WITH REDUCED EJECTION FRACTION): ICD-10-CM

## 2025-01-01 DIAGNOSIS — I31.4 PERICARDIAL TAMPONADE: ICD-10-CM

## 2025-01-01 DIAGNOSIS — E87.6 HYPOKALEMIA: ICD-10-CM

## 2025-01-01 LAB
ABO + RH BLD: NORMAL
ABSOLUTE EOSINOPHIL (OHS): 0 K/UL
ABSOLUTE EOSINOPHIL (OHS): 0.01 K/UL
ABSOLUTE EOSINOPHIL (OHS): 0.01 K/UL
ABSOLUTE EOSINOPHIL (OHS): 0.03 K/UL
ABSOLUTE EOSINOPHIL (OHS): 0.04 K/UL
ABSOLUTE EOSINOPHIL (OHS): 0.05 K/UL
ABSOLUTE EOSINOPHIL (OHS): 0.06 K/UL
ABSOLUTE EOSINOPHIL (OHS): 0.08 K/UL
ABSOLUTE EOSINOPHIL (OHS): 0.09 K/UL
ABSOLUTE EOSINOPHIL (OHS): 0.1 K/UL
ABSOLUTE EOSINOPHIL (OHS): 0.1 K/UL
ABSOLUTE EOSINOPHIL (OHS): 0.11 K/UL
ABSOLUTE EOSINOPHIL (OHS): 0.11 K/UL
ABSOLUTE EOSINOPHIL (OHS): 0.12 K/UL
ABSOLUTE EOSINOPHIL (OHS): 0.13 K/UL
ABSOLUTE EOSINOPHIL (OHS): 0.17 K/UL
ABSOLUTE MONOCYTE (OHS): 0.44 K/UL (ref 0.3–1)
ABSOLUTE MONOCYTE (OHS): 0.49 K/UL (ref 0.3–1)
ABSOLUTE MONOCYTE (OHS): 0.55 K/UL (ref 0.3–1)
ABSOLUTE MONOCYTE (OHS): 0.56 K/UL (ref 0.3–1)
ABSOLUTE MONOCYTE (OHS): 0.57 K/UL (ref 0.3–1)
ABSOLUTE MONOCYTE (OHS): 0.62 K/UL (ref 0.3–1)
ABSOLUTE MONOCYTE (OHS): 0.62 K/UL (ref 0.3–1)
ABSOLUTE MONOCYTE (OHS): 0.63 K/UL (ref 0.3–1)
ABSOLUTE MONOCYTE (OHS): 0.64 K/UL (ref 0.3–1)
ABSOLUTE MONOCYTE (OHS): 0.71 K/UL (ref 0.3–1)
ABSOLUTE MONOCYTE (OHS): 0.82 K/UL (ref 0.3–1)
ABSOLUTE MONOCYTE (OHS): 0.83 K/UL (ref 0.3–1)
ABSOLUTE MONOCYTE (OHS): 0.84 K/UL (ref 0.3–1)
ABSOLUTE MONOCYTE (OHS): 0.85 K/UL (ref 0.3–1)
ABSOLUTE MONOCYTE (OHS): 0.9 K/UL (ref 0.3–1)
ABSOLUTE MONOCYTE (OHS): 0.93 K/UL (ref 0.3–1)
ABSOLUTE MONOCYTE (OHS): 0.95 K/UL (ref 0.3–1)
ABSOLUTE MONOCYTE (OHS): 0.96 K/UL (ref 0.3–1)
ABSOLUTE MONOCYTE (OHS): 0.97 K/UL (ref 0.3–1)
ABSOLUTE MONOCYTE (OHS): 1.07 K/UL (ref 0.3–1)
ABSOLUTE MONOCYTE (OHS): 1.08 K/UL (ref 0.3–1)
ABSOLUTE MONOCYTE (OHS): 1.11 K/UL (ref 0.3–1)
ABSOLUTE MONOCYTE (OHS): 1.16 K/UL (ref 0.3–1)
ABSOLUTE MONOCYTE (OHS): 1.28 K/UL (ref 0.3–1)
ABSOLUTE MONOCYTE (OHS): 1.38 K/UL (ref 0.3–1)
ABSOLUTE NEUTROPHIL COUNT (OHS): 11.05 K/UL (ref 1.8–7.7)
ABSOLUTE NEUTROPHIL COUNT (OHS): 11.76 K/UL (ref 1.8–7.7)
ABSOLUTE NEUTROPHIL COUNT (OHS): 14.99 K/UL (ref 1.8–7.7)
ABSOLUTE NEUTROPHIL COUNT (OHS): 6.09 K/UL (ref 1.8–7.7)
ABSOLUTE NEUTROPHIL COUNT (OHS): 6.29 K/UL (ref 1.8–7.7)
ABSOLUTE NEUTROPHIL COUNT (OHS): 6.3 K/UL (ref 1.8–7.7)
ABSOLUTE NEUTROPHIL COUNT (OHS): 6.47 K/UL (ref 1.8–7.7)
ABSOLUTE NEUTROPHIL COUNT (OHS): 6.82 K/UL (ref 1.8–7.7)
ABSOLUTE NEUTROPHIL COUNT (OHS): 6.89 K/UL (ref 1.8–7.7)
ABSOLUTE NEUTROPHIL COUNT (OHS): 6.96 K/UL (ref 1.8–7.7)
ABSOLUTE NEUTROPHIL COUNT (OHS): 7.39 K/UL (ref 1.8–7.7)
ABSOLUTE NEUTROPHIL COUNT (OHS): 7.58 K/UL (ref 1.8–7.7)
ABSOLUTE NEUTROPHIL COUNT (OHS): 7.77 K/UL (ref 1.8–7.7)
ABSOLUTE NEUTROPHIL COUNT (OHS): 7.83 K/UL (ref 1.8–7.7)
ABSOLUTE NEUTROPHIL COUNT (OHS): 7.88 K/UL (ref 1.8–7.7)
ABSOLUTE NEUTROPHIL COUNT (OHS): 8.15 K/UL (ref 1.8–7.7)
ABSOLUTE NEUTROPHIL COUNT (OHS): 8.23 K/UL (ref 1.8–7.7)
ABSOLUTE NEUTROPHIL COUNT (OHS): 8.25 K/UL (ref 1.8–7.7)
ABSOLUTE NEUTROPHIL COUNT (OHS): 8.26 K/UL (ref 1.8–7.7)
ABSOLUTE NEUTROPHIL COUNT (OHS): 8.51 K/UL (ref 1.8–7.7)
ABSOLUTE NEUTROPHIL COUNT (OHS): 8.94 K/UL (ref 1.8–7.7)
ABSOLUTE NEUTROPHIL COUNT (OHS): 9.06 K/UL (ref 1.8–7.7)
ABSOLUTE NEUTROPHIL COUNT (OHS): 9.35 K/UL (ref 1.8–7.7)
ABSOLUTE NEUTROPHIL COUNT (OHS): 9.53 K/UL (ref 1.8–7.7)
ABSOLUTE NEUTROPHIL COUNT (OHS): 9.65 K/UL (ref 1.8–7.7)
ALBUMIN SERPL BCP-MCNC: 2.1 G/DL (ref 3.5–5.2)
ALBUMIN SERPL BCP-MCNC: 2.2 G/DL (ref 3.5–5.2)
ALBUMIN SERPL BCP-MCNC: 2.3 G/DL (ref 3.5–5.2)
ALBUMIN SERPL BCP-MCNC: 2.4 G/DL (ref 3.5–5.2)
ALBUMIN SERPL BCP-MCNC: 2.5 G/DL (ref 3.5–5.2)
ALBUMIN SERPL BCP-MCNC: 2.6 G/DL (ref 3.5–5.2)
ALBUMIN SERPL BCP-MCNC: 2.8 G/DL (ref 3.5–5.2)
ALBUMIN SERPL BCP-MCNC: 3.4 G/DL (ref 3.5–5.2)
ALDOST SERPL-MCNC: 71.3 NG/DL
ALDOST/RENIN PLAS-RTO: 3.8 RATIO
ALLENS TEST: ABNORMAL
ALLENS TEST: YES
ALP SERPL-CCNC: 120 UNIT/L (ref 40–150)
ALP SERPL-CCNC: 69 UNIT/L (ref 40–150)
ALP SERPL-CCNC: 70 UNIT/L (ref 40–150)
ALP SERPL-CCNC: 73 UNIT/L (ref 40–150)
ALP SERPL-CCNC: 78 UNIT/L (ref 40–150)
ALT SERPL W/O P-5'-P-CCNC: 1377 UNIT/L (ref 10–44)
ALT SERPL W/O P-5'-P-CCNC: 15 UNIT/L (ref 10–44)
ALT SERPL W/O P-5'-P-CCNC: 20 UNIT/L (ref 10–44)
ALT SERPL W/O P-5'-P-CCNC: 27 UNIT/L (ref 10–44)
ALT SERPL W/O P-5'-P-CCNC: <5 UNIT/L (ref 10–44)
ANION GAP (OHS): 10 MMOL/L (ref 8–16)
ANION GAP (OHS): 11 MMOL/L (ref 8–16)
ANION GAP (OHS): 12 MMOL/L (ref 8–16)
ANION GAP (OHS): 13 MMOL/L (ref 8–16)
ANION GAP (OHS): 14 MMOL/L (ref 8–16)
ANION GAP (OHS): 14 MMOL/L (ref 8–16)
ANION GAP (OHS): 15 MMOL/L (ref 8–16)
ANION GAP (OHS): 17 MMOL/L (ref 8–16)
ANION GAP (OHS): 17 MMOL/L (ref 8–16)
ANION GAP (OHS): 18 MMOL/L (ref 8–16)
ANION GAP (OHS): 18 MMOL/L (ref 8–16)
ANION GAP (OHS): 19 MMOL/L (ref 8–16)
ANION GAP (OHS): 20 MMOL/L (ref 8–16)
ANION GAP (OHS): 21 MMOL/L (ref 8–16)
ANION GAP (OHS): 22 MMOL/L (ref 8–16)
ANION GAP (OHS): 24 MMOL/L (ref 8–16)
ANION GAP (OHS): 28 MMOL/L (ref 8–16)
ANION GAP (OHS): 28 MMOL/L (ref 8–16)
AORTIC SIZE INDEX (SOV): 1.8 CM/M2
AORTIC SIZE INDEX (SOV): 2 CM/M2
AORTIC SIZE INDEX: 1.3 CM/M2
AORTIC SIZE INDEX: 1.9 CM/M2
APICAL FOUR CHAMBER EJECTION FRACTION: 37 %
APICAL FOUR CHAMBER EJECTION FRACTION: 47 %
APICAL TWO CHAMBER EJECTION FRACTION: 25 %
APICAL TWO CHAMBER EJECTION FRACTION: 36 %
APPEARANCE FLD: NORMAL
APTT PPP: 29.1 SECONDS (ref 21–32)
APTT PPP: 31.1 SECONDS (ref 21–32)
APTT PPP: 32.5 SECONDS (ref 21–32)
APTT PPP: 32.7 SECONDS (ref 21–32)
APTT PPP: 34.7 SECONDS (ref 21–32)
APTT PPP: 36.8 SECONDS (ref 21–32)
APTT PPP: 41 SECONDS (ref 21–32)
APTT PPP: 41.2 SECONDS (ref 21–32)
APTT PPP: 47 SECONDS (ref 21–32)
APTT PPP: 49.3 SECONDS (ref 21–32)
APTT PPP: 50.1 SECONDS (ref 21–32)
APTT PPP: 51.7 SECONDS (ref 21–32)
APTT PPP: 54.7 SECONDS (ref 21–32)
APTT PPP: 56.8 SECONDS (ref 21–32)
APTT PPP: 70.9 SECONDS (ref 21–32)
APTT PPP: 71.2 SECONDS (ref 21–32)
ASCENDING AORTA: 2.5 CM
ASCENDING AORTA: 3.6 CM
AST SERPL-CCNC: 1400 UNIT/L (ref 11–45)
AST SERPL-CCNC: 15 UNIT/L (ref 11–45)
AST SERPL-CCNC: 15 UNIT/L (ref 11–45)
AST SERPL-CCNC: 37 UNIT/L (ref 11–45)
AST SERPL-CCNC: 55 UNIT/L (ref 11–45)
AV INDEX (PROSTH): 0.57
AV MEAN GRADIENT: 7 MMHG
AV PEAK GRADIENT: 13 MMHG
AV VALVE AREA BY VELOCITY RATIO: 2.3 CM²
AV VALVE AREA: 2.2 CM²
AV VELOCITY RATIO: 0.61
BACTERIA #/AREA URNS AUTO: ABNORMAL /HPF
BASOPHILS # BLD AUTO: 0 K/UL
BASOPHILS # BLD AUTO: 0.01 K/UL
BASOPHILS # BLD AUTO: 0.02 K/UL
BASOPHILS # BLD AUTO: 0.03 K/UL
BASOPHILS # BLD AUTO: 0.04 K/UL
BASOPHILS NFR BLD AUTO: 0 %
BASOPHILS NFR BLD AUTO: 0.1 %
BASOPHILS NFR BLD AUTO: 0.2 %
BASOPHILS NFR BLD AUTO: 0.3 %
BASOPHILS NFR BLD AUTO: 0.3 %
BILIRUB SERPL-MCNC: 0.6 MG/DL (ref 0.1–1)
BILIRUB SERPL-MCNC: 0.8 MG/DL (ref 0.1–1)
BILIRUB SERPL-MCNC: 0.9 MG/DL (ref 0.1–1)
BILIRUB SERPL-MCNC: 1 MG/DL (ref 0.1–1)
BILIRUB SERPL-MCNC: 1.3 MG/DL (ref 0.1–1)
BILIRUB UR QL STRIP.AUTO: NEGATIVE
BLD PROD TYP BPU: NORMAL
BLOOD UNIT EXPIRATION DATE: NORMAL
BLOOD UNIT TYPE CODE: 600
BNP SERPL-MCNC: 247 PG/ML (ref 0–99)
BNP SERPL-MCNC: 283 PG/ML (ref 0–99)
BSA FOR ECHO PROCEDURE: 1.93 M2
BSA FOR ECHO PROCEDURE: 2.01 M2
BSA FOR ECHO PROCEDURE: 2.01 M2
BUN SERPL-MCNC: 100 MG/DL (ref 8–23)
BUN SERPL-MCNC: 106 MG/DL (ref 8–23)
BUN SERPL-MCNC: 21 MG/DL (ref 8–23)
BUN SERPL-MCNC: 23 MG/DL (ref 8–23)
BUN SERPL-MCNC: 32 MG/DL (ref 8–23)
BUN SERPL-MCNC: 37 MG/DL (ref 8–23)
BUN SERPL-MCNC: 44 MG/DL (ref 8–23)
BUN SERPL-MCNC: 45 MG/DL (ref 8–23)
BUN SERPL-MCNC: 48 MG/DL (ref 8–23)
BUN SERPL-MCNC: 48 MG/DL (ref 8–23)
BUN SERPL-MCNC: 52 MG/DL (ref 8–23)
BUN SERPL-MCNC: 53 MG/DL (ref 8–23)
BUN SERPL-MCNC: 54 MG/DL (ref 8–23)
BUN SERPL-MCNC: 54 MG/DL (ref 8–23)
BUN SERPL-MCNC: 56 MG/DL (ref 8–23)
BUN SERPL-MCNC: 56 MG/DL (ref 8–23)
BUN SERPL-MCNC: 57 MG/DL (ref 8–23)
BUN SERPL-MCNC: 60 MG/DL (ref 8–23)
BUN SERPL-MCNC: 62 MG/DL (ref 8–23)
BUN SERPL-MCNC: 62 MG/DL (ref 8–23)
BUN SERPL-MCNC: 63 MG/DL (ref 8–23)
BUN SERPL-MCNC: 66 MG/DL (ref 8–23)
BUN SERPL-MCNC: 67 MG/DL (ref 8–23)
BUN SERPL-MCNC: 69 MG/DL (ref 8–23)
BUN SERPL-MCNC: 71 MG/DL (ref 8–23)
BUN SERPL-MCNC: 71 MG/DL (ref 8–23)
BUN SERPL-MCNC: 77 MG/DL (ref 8–23)
BUN SERPL-MCNC: 79 MG/DL (ref 8–23)
BUN SERPL-MCNC: 82 MG/DL (ref 8–23)
BUN SERPL-MCNC: 82 MG/DL (ref 8–23)
BUN SERPL-MCNC: 84 MG/DL (ref 8–23)
BUN SERPL-MCNC: 84 MG/DL (ref 8–23)
BUN SERPL-MCNC: 89 MG/DL (ref 8–23)
BUN SERPL-MCNC: 90 MG/DL (ref 8–23)
BUN SERPL-MCNC: 91 MG/DL (ref 8–23)
BUN SERPL-MCNC: 94 MG/DL (ref 8–23)
BUN SERPL-MCNC: 96 MG/DL (ref 8–23)
BUN SERPL-MCNC: 98 MG/DL (ref 8–23)
CALCIUM SERPL-MCNC: 10.7 MG/DL (ref 8.7–10.5)
CALCIUM SERPL-MCNC: 10.7 MG/DL (ref 8.7–10.5)
CALCIUM SERPL-MCNC: 7.4 MG/DL (ref 8.7–10.5)
CALCIUM SERPL-MCNC: 7.4 MG/DL (ref 8.7–10.5)
CALCIUM SERPL-MCNC: 7.5 MG/DL (ref 8.7–10.5)
CALCIUM SERPL-MCNC: 7.5 MG/DL (ref 8.7–10.5)
CALCIUM SERPL-MCNC: 7.6 MG/DL (ref 8.7–10.5)
CALCIUM SERPL-MCNC: 7.7 MG/DL (ref 8.7–10.5)
CALCIUM SERPL-MCNC: 7.8 MG/DL (ref 8.7–10.5)
CALCIUM SERPL-MCNC: 8 MG/DL (ref 8.7–10.5)
CALCIUM SERPL-MCNC: 8 MG/DL (ref 8.7–10.5)
CALCIUM SERPL-MCNC: 8.1 MG/DL (ref 8.7–10.5)
CALCIUM SERPL-MCNC: 8.1 MG/DL (ref 8.7–10.5)
CALCIUM SERPL-MCNC: 8.2 MG/DL (ref 8.7–10.5)
CALCIUM SERPL-MCNC: 8.3 MG/DL (ref 8.7–10.5)
CALCIUM SERPL-MCNC: 8.3 MG/DL (ref 8.7–10.5)
CALCIUM SERPL-MCNC: 8.4 MG/DL (ref 8.7–10.5)
CALCIUM SERPL-MCNC: 8.5 MG/DL (ref 8.7–10.5)
CALCIUM SERPL-MCNC: 8.7 MG/DL (ref 8.7–10.5)
CALCIUM SERPL-MCNC: 8.8 MG/DL (ref 8.7–10.5)
CALCIUM SERPL-MCNC: 9.1 MG/DL (ref 8.7–10.5)
CALCIUM SERPL-MCNC: 9.1 MG/DL (ref 8.7–10.5)
CHLORIDE SERPL-SCNC: 100 MMOL/L (ref 95–110)
CHLORIDE SERPL-SCNC: 100 MMOL/L (ref 95–110)
CHLORIDE SERPL-SCNC: 101 MMOL/L (ref 95–110)
CHLORIDE SERPL-SCNC: 102 MMOL/L (ref 95–110)
CHLORIDE SERPL-SCNC: 102 MMOL/L (ref 95–110)
CHLORIDE SERPL-SCNC: 104 MMOL/L (ref 95–110)
CHLORIDE SERPL-SCNC: 83 MMOL/L (ref 95–110)
CHLORIDE SERPL-SCNC: 84 MMOL/L (ref 95–110)
CHLORIDE SERPL-SCNC: 85 MMOL/L (ref 95–110)
CHLORIDE SERPL-SCNC: 86 MMOL/L (ref 95–110)
CHLORIDE SERPL-SCNC: 86 MMOL/L (ref 95–110)
CHLORIDE SERPL-SCNC: 89 MMOL/L (ref 95–110)
CHLORIDE SERPL-SCNC: 93 MMOL/L (ref 95–110)
CHLORIDE SERPL-SCNC: 94 MMOL/L (ref 95–110)
CHLORIDE SERPL-SCNC: 94 MMOL/L (ref 95–110)
CHLORIDE SERPL-SCNC: 96 MMOL/L (ref 95–110)
CHLORIDE SERPL-SCNC: 97 MMOL/L (ref 95–110)
CHLORIDE SERPL-SCNC: 97 MMOL/L (ref 95–110)
CHLORIDE SERPL-SCNC: 98 MMOL/L (ref 95–110)
CHLORIDE SERPL-SCNC: 99 MMOL/L (ref 95–110)
CK SERPL-CCNC: 655 U/L (ref 20–200)
CLARITY UR: ABNORMAL
CO2 SERPL-SCNC: 16 MMOL/L (ref 23–29)
CO2 SERPL-SCNC: 17 MMOL/L (ref 23–29)
CO2 SERPL-SCNC: 17 MMOL/L (ref 23–29)
CO2 SERPL-SCNC: 18 MMOL/L (ref 23–29)
CO2 SERPL-SCNC: 18 MMOL/L (ref 23–29)
CO2 SERPL-SCNC: 19 MMOL/L (ref 23–29)
CO2 SERPL-SCNC: 20 MMOL/L (ref 23–29)
CO2 SERPL-SCNC: 21 MMOL/L (ref 23–29)
CO2 SERPL-SCNC: 23 MMOL/L (ref 23–29)
CO2 SERPL-SCNC: 24 MMOL/L (ref 23–29)
CO2 SERPL-SCNC: 25 MMOL/L (ref 23–29)
CO2 SERPL-SCNC: 26 MMOL/L (ref 23–29)
CO2 SERPL-SCNC: 27 MMOL/L (ref 23–29)
CO2 SERPL-SCNC: 28 MMOL/L (ref 23–29)
COLOR FLD: NORMAL
COLOR UR AUTO: ABNORMAL
CORTIS SERPL-MCNC: 22.3 UG/DL
CREAT SERPL-MCNC: 3.1 MG/DL (ref 0.5–1.4)
CREAT SERPL-MCNC: 3.6 MG/DL (ref 0.5–1.4)
CREAT SERPL-MCNC: 3.9 MG/DL (ref 0.5–1.4)
CREAT SERPL-MCNC: 4.1 MG/DL (ref 0.5–1.4)
CREAT SERPL-MCNC: 4.4 MG/DL (ref 0.5–1.4)
CREAT SERPL-MCNC: 4.5 MG/DL (ref 0.5–1.4)
CREAT SERPL-MCNC: 5 MG/DL (ref 0.5–1.4)
CREAT SERPL-MCNC: 5.5 MG/DL (ref 0.5–1.4)
CREAT SERPL-MCNC: 5.5 MG/DL (ref 0.5–1.4)
CREAT SERPL-MCNC: 5.9 MG/DL (ref 0.5–1.4)
CREAT SERPL-MCNC: 6 MG/DL (ref 0.5–1.4)
CREAT SERPL-MCNC: 6.1 MG/DL (ref 0.5–1.4)
CREAT SERPL-MCNC: 6.2 MG/DL (ref 0.5–1.4)
CREAT SERPL-MCNC: 6.3 MG/DL (ref 0.5–1.4)
CREAT SERPL-MCNC: 6.6 MG/DL (ref 0.5–1.4)
CREAT SERPL-MCNC: 6.7 MG/DL (ref 0.5–1.4)
CREAT SERPL-MCNC: 7.4 MG/DL (ref 0.5–1.4)
CREAT SERPL-MCNC: 7.5 MG/DL (ref 0.5–1.4)
CREAT SERPL-MCNC: 7.6 MG/DL (ref 0.5–1.4)
CREAT SERPL-MCNC: 8.2 MG/DL (ref 0.5–1.4)
CREAT SERPL-MCNC: 8.2 MG/DL (ref 0.5–1.4)
CREAT SERPL-MCNC: 8.3 MG/DL (ref 0.5–1.4)
CREAT SERPL-MCNC: 8.5 MG/DL (ref 0.5–1.4)
CREAT SERPL-MCNC: 8.5 MG/DL (ref 0.5–1.4)
CREAT SERPL-MCNC: 8.6 MG/DL (ref 0.5–1.4)
CREAT SERPL-MCNC: 8.9 MG/DL (ref 0.5–1.4)
CREAT SERPL-MCNC: 9 MG/DL (ref 0.5–1.4)
CREAT SERPL-MCNC: 9.2 MG/DL (ref 0.5–1.4)
CREAT SERPL-MCNC: 9.3 MG/DL (ref 0.5–1.4)
CREAT SERPL-MCNC: 9.4 MG/DL (ref 0.5–1.4)
CREAT SERPL-MCNC: 9.5 MG/DL (ref 0.5–1.4)
CREAT SERPL-MCNC: 9.7 MG/DL (ref 0.5–1.4)
CREAT UR-MCNC: 79.5 MG/DL (ref 23–375)
CROSSMATCH INTERPRETATION: NORMAL
CV ECHO LV RWT: 0.4 CM
CV ECHO LV RWT: 0.5 CM
CV ECHO LV RWT: 0.74 CM
DISPENSE STATUS: NORMAL
DOP CALC AO PEAK VEL: 1.8 M/S
DOP CALC AO VTI: 29.6 CM
DOP CALC LVOT AREA: 3.8 CM2
DOP CALC LVOT DIAMETER: 2.2 CM
DOP CALC LVOT PEAK VEL: 1.1 M/S
DOP CALC LVOT STROKE VOLUME: 63.8 CM3
DOP CALCLVOT PEAK VEL VTI: 16.8 CM
E WAVE DECELERATION TIME: 198 MSEC
E/A RATIO: 1
E/E' RATIO: 13 M/S
ECHO LV POSTERIOR WALL: 1.1 CM (ref 0.6–1.1)
ECHO LV POSTERIOR WALL: 1.3 CM (ref 0.6–1.1)
ECHO LV POSTERIOR WALL: 1.6 CM (ref 0.6–1.1)
EOSINOPHIL NFR FLD MANUAL: 1 %
ERYTHROCYTE [DISTWIDTH] IN BLOOD BY AUTOMATED COUNT: 15.6 % (ref 11.5–14.5)
ERYTHROCYTE [DISTWIDTH] IN BLOOD BY AUTOMATED COUNT: 15.7 % (ref 11.5–14.5)
ERYTHROCYTE [DISTWIDTH] IN BLOOD BY AUTOMATED COUNT: 15.7 % (ref 11.5–14.5)
ERYTHROCYTE [DISTWIDTH] IN BLOOD BY AUTOMATED COUNT: 15.8 % (ref 11.5–14.5)
ERYTHROCYTE [DISTWIDTH] IN BLOOD BY AUTOMATED COUNT: 15.9 % (ref 11.5–14.5)
ERYTHROCYTE [DISTWIDTH] IN BLOOD BY AUTOMATED COUNT: 16 % (ref 11.5–14.5)
ERYTHROCYTE [DISTWIDTH] IN BLOOD BY AUTOMATED COUNT: 16.2 % (ref 11.5–14.5)
ERYTHROCYTE [DISTWIDTH] IN BLOOD BY AUTOMATED COUNT: 16.2 % (ref 11.5–14.5)
ERYTHROCYTE [DISTWIDTH] IN BLOOD BY AUTOMATED COUNT: 16.3 % (ref 11.5–14.5)
ERYTHROCYTE [DISTWIDTH] IN BLOOD BY AUTOMATED COUNT: 16.4 % (ref 11.5–14.5)
ERYTHROCYTE [DISTWIDTH] IN BLOOD BY AUTOMATED COUNT: 16.6 % (ref 11.5–14.5)
ERYTHROCYTE [DISTWIDTH] IN BLOOD BY AUTOMATED COUNT: 16.8 % (ref 11.5–14.5)
ERYTHROCYTE [DISTWIDTH] IN BLOOD BY AUTOMATED COUNT: 16.8 % (ref 11.5–14.5)
FERRITIN SERPL-MCNC: 2942 NG/ML (ref 20–300)
FIO2: 100 %
FIO2: 100 %
FIO2: 21 %
FIO2: 50 %
FIO2: 50 %
FIO2: 70 %
FOLATE SERPL-MCNC: 3.7 NG/ML (ref 4–24)
FRACTIONAL SHORTENING: 18.2 % (ref 28–44)
FRACTIONAL SHORTENING: 18.6 % (ref 28–44)
FRACTIONAL SHORTENING: 23.1 % (ref 28–44)
GFR SERPLBLD CREATININE-BSD FMLA CKD-EPI: 10 ML/MIN/1.73/M2
GFR SERPLBLD CREATININE-BSD FMLA CKD-EPI: 11 ML/MIN/1.73/M2
GFR SERPLBLD CREATININE-BSD FMLA CKD-EPI: 11 ML/MIN/1.73/M2
GFR SERPLBLD CREATININE-BSD FMLA CKD-EPI: 12 ML/MIN/1.73/M2
GFR SERPLBLD CREATININE-BSD FMLA CKD-EPI: 14 ML/MIN/1.73/M2
GFR SERPLBLD CREATININE-BSD FMLA CKD-EPI: 14 ML/MIN/1.73/M2
GFR SERPLBLD CREATININE-BSD FMLA CKD-EPI: 16 ML/MIN/1.73/M2
GFR SERPLBLD CREATININE-BSD FMLA CKD-EPI: 17 ML/MIN/1.73/M2
GFR SERPLBLD CREATININE-BSD FMLA CKD-EPI: 18 ML/MIN/1.73/M2
GFR SERPLBLD CREATININE-BSD FMLA CKD-EPI: 22 ML/MIN/1.73/M2
GFR SERPLBLD CREATININE-BSD FMLA CKD-EPI: 6 ML/MIN/1.73/M2
GFR SERPLBLD CREATININE-BSD FMLA CKD-EPI: 7 ML/MIN/1.73/M2
GFR SERPLBLD CREATININE-BSD FMLA CKD-EPI: 8 ML/MIN/1.73/M2
GFR SERPLBLD CREATININE-BSD FMLA CKD-EPI: 8 ML/MIN/1.73/M2
GFR SERPLBLD CREATININE-BSD FMLA CKD-EPI: 9 ML/MIN/1.73/M2
GLUCOSE SERPL-MCNC: 101 MG/DL (ref 70–110)
GLUCOSE SERPL-MCNC: 102 MG/DL (ref 70–110)
GLUCOSE SERPL-MCNC: 103 MG/DL (ref 70–110)
GLUCOSE SERPL-MCNC: 104 MG/DL (ref 70–110)
GLUCOSE SERPL-MCNC: 105 MG/DL (ref 70–110)
GLUCOSE SERPL-MCNC: 107 MG/DL (ref 70–110)
GLUCOSE SERPL-MCNC: 108 MG/DL (ref 70–110)
GLUCOSE SERPL-MCNC: 108 MG/DL (ref 70–110)
GLUCOSE SERPL-MCNC: 109 MG/DL (ref 70–110)
GLUCOSE SERPL-MCNC: 111 MG/DL (ref 70–110)
GLUCOSE SERPL-MCNC: 111 MG/DL (ref 70–110)
GLUCOSE SERPL-MCNC: 115 MG/DL (ref 70–110)
GLUCOSE SERPL-MCNC: 115 MG/DL (ref 70–110)
GLUCOSE SERPL-MCNC: 118 MG/DL (ref 70–110)
GLUCOSE SERPL-MCNC: 118 MG/DL (ref 70–110)
GLUCOSE SERPL-MCNC: 121 MG/DL (ref 70–110)
GLUCOSE SERPL-MCNC: 130 MG/DL (ref 70–110)
GLUCOSE SERPL-MCNC: 130 MG/DL (ref 70–110)
GLUCOSE SERPL-MCNC: 134 MG/DL (ref 70–110)
GLUCOSE SERPL-MCNC: 136 MG/DL (ref 70–110)
GLUCOSE SERPL-MCNC: 138 MG/DL (ref 70–110)
GLUCOSE SERPL-MCNC: 160 MG/DL (ref 70–110)
GLUCOSE SERPL-MCNC: 186 MG/DL (ref 70–110)
GLUCOSE SERPL-MCNC: 186 MG/DL (ref 70–110)
GLUCOSE SERPL-MCNC: 273 MG/DL (ref 70–110)
GLUCOSE SERPL-MCNC: 280 MG/DL (ref 70–110)
GLUCOSE SERPL-MCNC: 93 MG/DL (ref 70–110)
GLUCOSE SERPL-MCNC: 95 MG/DL (ref 70–110)
GLUCOSE SERPL-MCNC: 98 MG/DL (ref 70–110)
GLUCOSE UR QL STRIP: ABNORMAL
GRAM STN SPEC: NORMAL
GRAM STN SPEC: NORMAL
HBV SURFACE AB SER-ACNC: 119.09 MIU/ML
HBV SURFACE AB SERPL IA-ACNC: REACTIVE M[IU]/ML
HBV SURFACE AG SERPL QL IA: NORMAL
HCT VFR BLD AUTO: 20.8 % (ref 40–54)
HCT VFR BLD AUTO: 21.8 % (ref 40–54)
HCT VFR BLD AUTO: 22.3 % (ref 40–54)
HCT VFR BLD AUTO: 22.5 % (ref 40–54)
HCT VFR BLD AUTO: 22.5 % (ref 40–54)
HCT VFR BLD AUTO: 22.7 % (ref 40–54)
HCT VFR BLD AUTO: 22.8 % (ref 40–54)
HCT VFR BLD AUTO: 24.2 % (ref 40–54)
HCT VFR BLD AUTO: 24.2 % (ref 40–54)
HCT VFR BLD AUTO: 24.5 % (ref 40–54)
HCT VFR BLD AUTO: 24.6 % (ref 40–54)
HCT VFR BLD AUTO: 25 % (ref 40–54)
HCT VFR BLD AUTO: 25.5 % (ref 40–54)
HCT VFR BLD AUTO: 25.6 % (ref 40–54)
HCT VFR BLD AUTO: 25.7 % (ref 40–54)
HCT VFR BLD AUTO: 26.3 % (ref 40–54)
HCT VFR BLD AUTO: 26.8 % (ref 40–54)
HCT VFR BLD AUTO: 28 % (ref 40–54)
HCT VFR BLD AUTO: 29.8 % (ref 40–54)
HCT VFR BLD AUTO: 30 % (ref 40–54)
HCT VFR BLD AUTO: 31.2 % (ref 40–54)
HCT VFR BLD AUTO: 36.6 % (ref 40–54)
HCT VFR BLD AUTO: 37.3 % (ref 40–54)
HCT VFR BLD CALC: 21.8 % (ref 36–54)
HCT VFR BLD CALC: 24.2 % (ref 36–54)
HCT VFR BLD CALC: 25.8 % (ref 36–54)
HCT VFR BLD CALC: 30.8 % (ref 36–54)
HGB BLD-MCNC: 10 G/DL (ref 9–18)
HGB BLD-MCNC: 10.4 GM/DL (ref 14–18)
HGB BLD-MCNC: 10.5 GM/DL (ref 14–18)
HGB BLD-MCNC: 12.3 GM/DL (ref 14–18)
HGB BLD-MCNC: 12.7 GM/DL (ref 14–18)
HGB BLD-MCNC: 7.1 G/DL (ref 9–18)
HGB BLD-MCNC: 7.1 GM/DL (ref 14–18)
HGB BLD-MCNC: 7.3 GM/DL (ref 14–18)
HGB BLD-MCNC: 7.4 GM/DL (ref 14–18)
HGB BLD-MCNC: 7.5 GM/DL (ref 14–18)
HGB BLD-MCNC: 7.7 GM/DL (ref 14–18)
HGB BLD-MCNC: 7.9 G/DL (ref 9–18)
HGB BLD-MCNC: 7.9 GM/DL (ref 14–18)
HGB BLD-MCNC: 8.1 GM/DL (ref 14–18)
HGB BLD-MCNC: 8.1 GM/DL (ref 14–18)
HGB BLD-MCNC: 8.2 GM/DL (ref 14–18)
HGB BLD-MCNC: 8.3 GM/DL (ref 14–18)
HGB BLD-MCNC: 8.4 G/DL (ref 9–18)
HGB BLD-MCNC: 8.4 GM/DL (ref 14–18)
HGB BLD-MCNC: 8.4 GM/DL (ref 14–18)
HGB BLD-MCNC: 8.5 GM/DL (ref 14–18)
HGB BLD-MCNC: 8.6 GM/DL (ref 14–18)
HGB BLD-MCNC: 9 GM/DL (ref 14–18)
HGB BLD-MCNC: 9.8 GM/DL (ref 14–18)
HGB UR QL STRIP: ABNORMAL
HOLD SPECIMEN: NORMAL
HYALINE CASTS UR QL AUTO: 8 /LPF (ref 0–1)
IMM GRANULOCYTES # BLD AUTO: 0.02 K/UL (ref 0–0.04)
IMM GRANULOCYTES # BLD AUTO: 0.04 K/UL (ref 0–0.04)
IMM GRANULOCYTES # BLD AUTO: 0.04 K/UL (ref 0–0.04)
IMM GRANULOCYTES # BLD AUTO: 0.05 K/UL (ref 0–0.04)
IMM GRANULOCYTES # BLD AUTO: 0.06 K/UL (ref 0–0.04)
IMM GRANULOCYTES # BLD AUTO: 0.07 K/UL (ref 0–0.04)
IMM GRANULOCYTES # BLD AUTO: 0.08 K/UL (ref 0–0.04)
IMM GRANULOCYTES # BLD AUTO: 0.11 K/UL (ref 0–0.04)
IMM GRANULOCYTES # BLD AUTO: 0.23 K/UL (ref 0–0.04)
IMM GRANULOCYTES # BLD AUTO: 0.64 K/UL (ref 0–0.04)
IMM GRANULOCYTES # BLD AUTO: 0.93 K/UL (ref 0–0.04)
IMM GRANULOCYTES NFR BLD AUTO: 0.2 % (ref 0–0.5)
IMM GRANULOCYTES NFR BLD AUTO: 0.4 % (ref 0–0.5)
IMM GRANULOCYTES NFR BLD AUTO: 0.5 % (ref 0–0.5)
IMM GRANULOCYTES NFR BLD AUTO: 0.6 % (ref 0–0.5)
IMM GRANULOCYTES NFR BLD AUTO: 0.7 % (ref 0–0.5)
IMM GRANULOCYTES NFR BLD AUTO: 0.8 % (ref 0–0.5)
IMM GRANULOCYTES NFR BLD AUTO: 0.9 % (ref 0–0.5)
IMM GRANULOCYTES NFR BLD AUTO: 1 % (ref 0–0.5)
IMM GRANULOCYTES NFR BLD AUTO: 1.7 % (ref 0–0.5)
IMM GRANULOCYTES NFR BLD AUTO: 4.6 % (ref 0–0.5)
IMM GRANULOCYTES NFR BLD AUTO: 4.6 % (ref 0–0.5)
INDIRECT COOMBS: NORMAL
INDIRECT COOMBS: NORMAL
INR PPP: 1.1 (ref 0.8–1.2)
INR PPP: 1.2 (ref 0.8–1.2)
INR PPP: 1.3 (ref 0.8–1.2)
INTERVENTRICULAR SEPTUM: 0.9 CM (ref 0.6–1.1)
INTERVENTRICULAR SEPTUM: 1.3 CM (ref 0.6–1.1)
INTERVENTRICULAR SEPTUM: 1.4 CM (ref 0.6–1.1)
IRON SATN MFR SERPL: 27 % (ref 20–50)
IRON SERPL-MCNC: 63 UG/DL (ref 45–160)
IVC DIAMETER: 0.67 CM
KETONES UR QL STRIP: NEGATIVE
LACTATE SERPL-SCNC: 1.6 MMOL/L (ref 0.5–2.2)
LACTATE SERPL-SCNC: 11.4 MMOL/L (ref 0.5–2.2)
LACTATE SERPL-SCNC: 3 MMOL/L (ref 0.5–2.2)
LDH SERPL L TO P-CCNC: 10.7 MMOL/L (ref 0.4–1.3)
LDH SERPL L TO P-CCNC: 2.1 MMOL/L (ref 0.4–1.3)
LDH SERPL L TO P-CCNC: 9 MMOL/L (ref 0.4–1.3)
LEFT ATRIUM AREA SYSTOLIC (APICAL 2 CHAMBER): 18.11 CM2
LEFT ATRIUM AREA SYSTOLIC (APICAL 4 CHAMBER): 12.62 CM2
LEFT ATRIUM AREA SYSTOLIC (APICAL 4 CHAMBER): 20.24 CM2
LEFT ATRIUM VOLUME INDEX MOD: 26 ML/M2
LEFT ATRIUM VOLUME MOD: 51 ML
LEFT INTERNAL DIMENSION IN SYSTOLE: 3.5 CM (ref 2.1–4)
LEFT INTERNAL DIMENSION IN SYSTOLE: 3.6 CM (ref 2.1–4)
LEFT INTERNAL DIMENSION IN SYSTOLE: 5 CM (ref 2.1–4)
LEFT VENTRICLE DIASTOLIC VOLUME INDEX: 106.5 ML/M2
LEFT VENTRICLE DIASTOLIC VOLUME INDEX: 44.04 ML/M2
LEFT VENTRICLE DIASTOLIC VOLUME INDEX: 44.5 ML/M2
LEFT VENTRICLE DIASTOLIC VOLUME: 213 ML
LEFT VENTRICLE DIASTOLIC VOLUME: 85 ML
LEFT VENTRICLE DIASTOLIC VOLUME: 89 ML
LEFT VENTRICLE END DIASTOLIC VOLUME APICAL 2 CHAMBER: 100.77 ML
LEFT VENTRICLE END DIASTOLIC VOLUME APICAL 2 CHAMBER: 96.37 ML
LEFT VENTRICLE END DIASTOLIC VOLUME APICAL 4 CHAMBER: 109.71 ML
LEFT VENTRICLE END DIASTOLIC VOLUME APICAL 4 CHAMBER: 94.71 ML
LEFT VENTRICLE END SYSTOLIC VOLUME APICAL 2 CHAMBER: 45.62 ML
LEFT VENTRICLE END SYSTOLIC VOLUME APICAL 4 CHAMBER: 22.64 ML
LEFT VENTRICLE END SYSTOLIC VOLUME APICAL 4 CHAMBER: 52.74 ML
LEFT VENTRICLE MASS INDEX: 133.7 G/M2
LEFT VENTRICLE MASS INDEX: 199.5 G/M2
LEFT VENTRICLE MASS INDEX: 73.9 G/M2
LEFT VENTRICLE SYSTOLIC VOLUME INDEX: 25.9 ML/M2
LEFT VENTRICLE SYSTOLIC VOLUME INDEX: 27.5 ML/M2
LEFT VENTRICLE SYSTOLIC VOLUME INDEX: 58 ML/M2
LEFT VENTRICLE SYSTOLIC VOLUME: 116 ML
LEFT VENTRICLE SYSTOLIC VOLUME: 50 ML
LEFT VENTRICLE SYSTOLIC VOLUME: 55 ML
LEFT VENTRICULAR INTERNAL DIMENSION IN DIASTOLE: 4.3 CM (ref 3.5–6)
LEFT VENTRICULAR INTERNAL DIMENSION IN DIASTOLE: 4.4 CM (ref 3.5–6)
LEFT VENTRICULAR INTERNAL DIMENSION IN DIASTOLE: 6.5 CM (ref 3.5–6)
LEFT VENTRICULAR MASS: 147.8 G
LEFT VENTRICULAR MASS: 258.1 G
LEFT VENTRICULAR MASS: 399.1 G
LEUKOCYTE ESTERASE UR QL STRIP: ABNORMAL
LV LATERAL E/E' RATIO: 12.2 M/S
LV SEPTAL E/E' RATIO: 14.6 M/S
LVED V (TEICH): 213.09 ML
LVED V (TEICH): 85.1 ML
LVED V (TEICH): 88.67 ML
LVES V (TEICH): 116.37 ML
LVES V (TEICH): 49.8 ML
LVES V (TEICH): 54.6 ML
LVOT MG: 2.43 MMHG
LVOT MV: 0.71 CM/S
LYMPHOCYTES # BLD AUTO: 0.44 K/UL (ref 1–4.8)
LYMPHOCYTES # BLD AUTO: 0.45 K/UL (ref 1–4.8)
LYMPHOCYTES # BLD AUTO: 0.46 K/UL (ref 1–4.8)
LYMPHOCYTES # BLD AUTO: 0.48 K/UL (ref 1–4.8)
LYMPHOCYTES # BLD AUTO: 0.61 K/UL (ref 1–4.8)
LYMPHOCYTES # BLD AUTO: 0.67 K/UL (ref 1–4.8)
LYMPHOCYTES # BLD AUTO: 0.67 K/UL (ref 1–4.8)
LYMPHOCYTES # BLD AUTO: 0.73 K/UL (ref 1–4.8)
LYMPHOCYTES # BLD AUTO: 0.75 K/UL (ref 1–4.8)
LYMPHOCYTES # BLD AUTO: 0.8 K/UL (ref 1–4.8)
LYMPHOCYTES # BLD AUTO: 0.81 K/UL (ref 1–4.8)
LYMPHOCYTES # BLD AUTO: 0.82 K/UL (ref 1–4.8)
LYMPHOCYTES # BLD AUTO: 0.87 K/UL (ref 1–4.8)
LYMPHOCYTES # BLD AUTO: 0.93 K/UL (ref 1–4.8)
LYMPHOCYTES # BLD AUTO: 0.95 K/UL (ref 1–4.8)
LYMPHOCYTES # BLD AUTO: 0.96 K/UL (ref 1–4.8)
LYMPHOCYTES # BLD AUTO: 0.99 K/UL (ref 1–4.8)
LYMPHOCYTES # BLD AUTO: 1.01 K/UL (ref 1–4.8)
LYMPHOCYTES # BLD AUTO: 1.02 K/UL (ref 1–4.8)
LYMPHOCYTES # BLD AUTO: 1.06 K/UL (ref 1–4.8)
LYMPHOCYTES # BLD AUTO: 1.29 K/UL (ref 1–4.8)
LYMPHOCYTES # BLD AUTO: 1.39 K/UL (ref 1–4.8)
LYMPHOCYTES # BLD AUTO: 1.61 K/UL (ref 1–4.8)
LYMPHOCYTES # BLD AUTO: 2.96 K/UL (ref 1–4.8)
LYMPHOCYTES # BLD AUTO: 3.14 K/UL (ref 1–4.8)
LYMPHOCYTES NFR FLD MANUAL: 9 %
MAGNESIUM SERPL-MCNC: 1.5 MG/DL (ref 1.6–2.6)
MAGNESIUM SERPL-MCNC: 2 MG/DL (ref 1.6–2.6)
MAGNESIUM SERPL-MCNC: 2.1 MG/DL (ref 1.6–2.6)
MAGNESIUM SERPL-MCNC: 2.1 MG/DL (ref 1.6–2.6)
MAGNESIUM SERPL-MCNC: 2.2 MG/DL (ref 1.6–2.6)
MAGNESIUM SERPL-MCNC: 2.2 MG/DL (ref 1.6–2.6)
MAGNESIUM SERPL-MCNC: 2.3 MG/DL (ref 1.6–2.6)
MAGNESIUM SERPL-MCNC: 2.3 MG/DL (ref 1.6–2.6)
MAGNESIUM SERPL-MCNC: 2.4 MG/DL (ref 1.6–2.6)
MAGNESIUM SERPL-MCNC: 2.5 MG/DL (ref 1.6–2.6)
MAGNESIUM SERPL-MCNC: 2.6 MG/DL (ref 1.6–2.6)
MAGNESIUM SERPL-MCNC: 2.6 MG/DL (ref 1.6–2.6)
MAGNESIUM SERPL-MCNC: 3 MG/DL (ref 1.6–2.6)
MAGNESIUM SERPL-MCNC: 6.4 MG/DL (ref 1.6–2.6)
MCH RBC QN AUTO: 27.5 PG (ref 27–31)
MCH RBC QN AUTO: 27.5 PG (ref 27–31)
MCH RBC QN AUTO: 27.7 PG (ref 27–31)
MCH RBC QN AUTO: 27.8 PG (ref 27–31)
MCH RBC QN AUTO: 28 PG (ref 27–31)
MCH RBC QN AUTO: 28.1 PG (ref 27–31)
MCH RBC QN AUTO: 28.3 PG (ref 27–31)
MCH RBC QN AUTO: 28.4 PG (ref 27–31)
MCH RBC QN AUTO: 28.6 PG (ref 27–31)
MCH RBC QN AUTO: 28.7 PG (ref 27–31)
MCH RBC QN AUTO: 28.7 PG (ref 27–31)
MCH RBC QN AUTO: 29.1 PG (ref 27–31)
MCH RBC QN AUTO: 29.2 PG (ref 27–31)
MCHC RBC AUTO-ENTMCNC: 31 G/DL (ref 32–36)
MCHC RBC AUTO-ENTMCNC: 31.1 G/DL (ref 32–36)
MCHC RBC AUTO-ENTMCNC: 31.3 G/DL (ref 32–36)
MCHC RBC AUTO-ENTMCNC: 31.8 G/DL (ref 32–36)
MCHC RBC AUTO-ENTMCNC: 32.1 G/DL (ref 32–36)
MCHC RBC AUTO-ENTMCNC: 32.2 G/DL (ref 32–36)
MCHC RBC AUTO-ENTMCNC: 32.2 G/DL (ref 32–36)
MCHC RBC AUTO-ENTMCNC: 32.5 G/DL (ref 32–36)
MCHC RBC AUTO-ENTMCNC: 32.7 G/DL (ref 32–36)
MCHC RBC AUTO-ENTMCNC: 32.8 G/DL (ref 32–36)
MCHC RBC AUTO-ENTMCNC: 32.9 G/DL (ref 32–36)
MCHC RBC AUTO-ENTMCNC: 33.2 G/DL (ref 32–36)
MCHC RBC AUTO-ENTMCNC: 33.2 G/DL (ref 32–36)
MCHC RBC AUTO-ENTMCNC: 33.5 G/DL (ref 32–36)
MCHC RBC AUTO-ENTMCNC: 33.6 G/DL (ref 32–36)
MCHC RBC AUTO-ENTMCNC: 33.7 G/DL (ref 32–36)
MCHC RBC AUTO-ENTMCNC: 34 G/DL (ref 32–36)
MCHC RBC AUTO-ENTMCNC: 34.7 G/DL (ref 32–36)
MCHC RBC AUTO-ENTMCNC: 35.6 G/DL (ref 32–36)
MCHC RBC AUTO-ENTMCNC: 36.3 G/DL (ref 32–36)
MCV RBC AUTO: 78 FL (ref 82–98)
MCV RBC AUTO: 79 FL (ref 82–98)
MCV RBC AUTO: 81 FL (ref 82–98)
MCV RBC AUTO: 82 FL (ref 82–98)
MCV RBC AUTO: 84 FL (ref 82–98)
MCV RBC AUTO: 84 FL (ref 82–98)
MCV RBC AUTO: 85 FL (ref 82–98)
MCV RBC AUTO: 86 FL (ref 82–98)
MCV RBC AUTO: 87 FL (ref 82–98)
MCV RBC AUTO: 88 FL (ref 82–98)
MCV RBC AUTO: 88 FL (ref 82–98)
MCV RBC AUTO: 89 FL (ref 82–98)
MCV RBC AUTO: 91 FL (ref 82–98)
MCV RBC AUTO: 94 FL (ref 82–98)
MICROSCOPIC COMMENT: ABNORMAL
MONOS+MACROS NFR FLD MANUAL: 23 %
MV PEAK A VEL: 0.73 M/S
MV PEAK E VEL: 0.73 M/S
MV STENOSIS PRESSURE HALF TIME: 57.38 MS
MV VALVE AREA P 1/2 METHOD: 3.83 CM2
NEUTROPHILS NFR FLD MANUAL: 67 %
NITRITE UR QL STRIP: NEGATIVE
NUCLEATED RBC (/100WBC) (OHS): 0 /100 WBC
NUCLEATED RBC (/100WBC) (OHS): 1 /100 WBC
NUCLEATED RBC (/100WBC) (OHS): 2 /100 WBC
OHS CV RV/LV RATIO: 0.46 CM
OHS CV RV/LV RATIO: 1.02 CM
OHS LV EJECTION FRACTION SIMPSONS BIPLANE MOD: 33 %
OHS LV EJECTION FRACTION SIMPSONS BIPLANE MOD: 38 %
OHS QRS DURATION: 102 MS
OHS QRS DURATION: 102 MS
OHS QRS DURATION: 104 MS
OHS QRS DURATION: 104 MS
OHS QRS DURATION: 106 MS
OHS QRS DURATION: 108 MS
OHS QRS DURATION: 108 MS
OHS QRS DURATION: 110 MS
OHS QRS DURATION: 114 MS
OHS QRS DURATION: 116 MS
OHS QRS DURATION: 116 MS
OHS QRS DURATION: 118 MS
OHS QRS DURATION: 122 MS
OHS QRS DURATION: 128 MS
OHS QRS DURATION: 130 MS
OHS QRS DURATION: 142 MS
OHS QRS DURATION: 148 MS
OHS QRS DURATION: 148 MS
OHS QRS DURATION: 154 MS
OHS QRS DURATION: 156 MS
OHS QRS DURATION: 156 MS
OHS QRS DURATION: 90 MS
OHS QRS DURATION: 90 MS
OHS QRS DURATION: 92 MS
OHS QTC CALCULATION: 474 MS
OHS QTC CALCULATION: 478 MS
OHS QTC CALCULATION: 481 MS
OHS QTC CALCULATION: 497 MS
OHS QTC CALCULATION: 499 MS
OHS QTC CALCULATION: 499 MS
OHS QTC CALCULATION: 502 MS
OHS QTC CALCULATION: 504 MS
OHS QTC CALCULATION: 526 MS
OHS QTC CALCULATION: 547 MS
OHS QTC CALCULATION: 548 MS
OHS QTC CALCULATION: 548 MS
OHS QTC CALCULATION: 550 MS
OHS QTC CALCULATION: 551 MS
OHS QTC CALCULATION: 572 MS
OHS QTC CALCULATION: 589 MS
OHS QTC CALCULATION: 600 MS
OHS QTC CALCULATION: 622 MS
OHS QTC CALCULATION: 627 MS
OHS QTC CALCULATION: 629 MS
OHS QTC CALCULATION: 653 MS
PCO2 BLDA: 42.8 MMHG (ref 35–45)
PCO2 BLDA: 43.3 MMHG (ref 35–45)
PCO2 BLDA: 45.6 MMHG (ref 35–45)
PCO2 BLDA: 46.8 MMHG (ref 35–45)
PCO2 BLDA: 46.8 MMHG (ref 35–45)
PCO2 BLDA: 56.1 MMHG (ref 35–45)
PEEP: 5
PH SMN: 7.13 [PH] (ref 7.35–7.45)
PH SMN: 7.24 [PH] (ref 7.35–7.45)
PH SMN: 7.41 [PH] (ref 7.35–7.45)
PH SMN: 7.42 [PH] (ref 7.35–7.45)
PH SMN: 7.44 [PH] (ref 7.35–7.45)
PH SMN: 7.45 [PH] (ref 7.35–7.45)
PH UR STRIP: 6 [PH]
PHOSPHATE SERPL-MCNC: 10.8 MG/DL (ref 2.7–4.5)
PHOSPHATE SERPL-MCNC: 4.1 MG/DL (ref 2.7–4.5)
PHOSPHATE SERPL-MCNC: 4.1 MG/DL (ref 2.7–4.5)
PHOSPHATE SERPL-MCNC: 4.7 MG/DL (ref 2.7–4.5)
PHOSPHATE SERPL-MCNC: 4.8 MG/DL (ref 2.7–4.5)
PHOSPHATE SERPL-MCNC: 4.9 MG/DL (ref 2.7–4.5)
PHOSPHATE SERPL-MCNC: 5.4 MG/DL (ref 2.7–4.5)
PHOSPHATE SERPL-MCNC: 5.6 MG/DL (ref 2.7–4.5)
PHOSPHATE SERPL-MCNC: 5.7 MG/DL (ref 2.7–4.5)
PHOSPHATE SERPL-MCNC: 5.8 MG/DL (ref 2.7–4.5)
PHOSPHATE SERPL-MCNC: 6.2 MG/DL (ref 2.7–4.5)
PHOSPHATE SERPL-MCNC: 6.5 MG/DL (ref 2.7–4.5)
PHOSPHATE SERPL-MCNC: 6.9 MG/DL (ref 2.7–4.5)
PHOSPHATE SERPL-MCNC: 7 MG/DL (ref 2.7–4.5)
PHOSPHATE SERPL-MCNC: 7.1 MG/DL (ref 2.7–4.5)
PHOSPHATE SERPL-MCNC: 7.2 MG/DL (ref 2.7–4.5)
PHOSPHATE SERPL-MCNC: 7.4 MG/DL (ref 2.7–4.5)
PHOSPHATE SERPL-MCNC: 7.8 MG/DL (ref 2.7–4.5)
PHOSPHATE SERPL-MCNC: 7.8 MG/DL (ref 2.7–4.5)
PHOSPHATE SERPL-MCNC: 8.4 MG/DL (ref 2.7–4.5)
PHOSPHATE SERPL-MCNC: 8.5 MG/DL (ref 2.7–4.5)
PISA MRMAX VEL: 3.99 M/S
PISA TR MAX VEL: 2.9 M/S
PLATELET # BLD AUTO: 100 K/UL (ref 150–450)
PLATELET # BLD AUTO: 113 K/UL (ref 150–450)
PLATELET # BLD AUTO: 120 K/UL (ref 150–450)
PLATELET # BLD AUTO: 134 K/UL (ref 150–450)
PLATELET # BLD AUTO: 136 K/UL (ref 150–450)
PLATELET # BLD AUTO: 139 K/UL (ref 150–450)
PLATELET # BLD AUTO: 140 K/UL (ref 150–450)
PLATELET # BLD AUTO: 140 K/UL (ref 150–450)
PLATELET # BLD AUTO: 149 K/UL (ref 150–450)
PLATELET # BLD AUTO: 149 K/UL (ref 150–450)
PLATELET # BLD AUTO: 152 K/UL (ref 150–450)
PLATELET # BLD AUTO: 155 K/UL (ref 150–450)
PLATELET # BLD AUTO: 156 K/UL (ref 150–450)
PLATELET # BLD AUTO: 158 K/UL (ref 150–450)
PLATELET # BLD AUTO: 165 K/UL (ref 150–450)
PLATELET # BLD AUTO: 195 K/UL (ref 150–450)
PLATELET # BLD AUTO: 204 K/UL (ref 150–450)
PLATELET # BLD AUTO: 232 K/UL (ref 150–450)
PLATELET # BLD AUTO: 235 K/UL (ref 150–450)
PLATELET # BLD AUTO: 257 K/UL (ref 150–450)
PLATELET # BLD AUTO: 259 K/UL (ref 150–450)
PLATELET # BLD AUTO: 266 K/UL (ref 150–450)
PLATELET # BLD AUTO: 279 K/UL (ref 150–450)
PLATELET # BLD AUTO: 286 K/UL (ref 150–450)
PLATELET # BLD AUTO: 96 K/UL (ref 150–450)
PMV BLD AUTO: 10.2 FL (ref 9.2–12.9)
PMV BLD AUTO: 10.3 FL (ref 9.2–12.9)
PMV BLD AUTO: 10.3 FL (ref 9.2–12.9)
PMV BLD AUTO: 10.5 FL (ref 9.2–12.9)
PMV BLD AUTO: 10.6 FL (ref 9.2–12.9)
PMV BLD AUTO: 10.7 FL (ref 9.2–12.9)
PMV BLD AUTO: 10.8 FL (ref 9.2–12.9)
PMV BLD AUTO: 10.9 FL (ref 9.2–12.9)
PMV BLD AUTO: 11 FL (ref 9.2–12.9)
PMV BLD AUTO: 11 FL (ref 9.2–12.9)
PMV BLD AUTO: 11.1 FL (ref 9.2–12.9)
PMV BLD AUTO: 11.2 FL (ref 9.2–12.9)
PMV BLD AUTO: 11.3 FL (ref 9.2–12.9)
PMV BLD AUTO: 11.5 FL (ref 9.2–12.9)
PMV BLD AUTO: 11.6 FL (ref 9.2–12.9)
PMV BLD AUTO: 11.7 FL (ref 9.2–12.9)
PMV BLD AUTO: 9.9 FL (ref 9.2–12.9)
PO2 BLDA: 10.2 MMHG (ref 40–60)
PO2 BLDA: 146 MMHG (ref 80–100)
PO2 BLDA: 200 MMHG (ref 80–100)
PO2 BLDA: 23.6 MMHG (ref 40–60)
PO2 BLDA: 327 MMHG (ref 80–100)
PO2 BLDA: 429 MMHG (ref 80–100)
POC BASE DEFICIT: -7.2 MMOL/L (ref -2–2)
POC BASE DEFICIT: -9.9 MMOL/L (ref -2–2)
POC BASE DEFICIT: 2.8 MMOL/L (ref -2–2)
POC BASE DEFICIT: 4.2 MMOL/L (ref -2–2)
POC BASE DEFICIT: 5 MMOL/L (ref -2–2)
POC BASE DEFICIT: 6.5 MMOL/L (ref -2–2)
POC HCO3: 18.7 MMOL/L (ref 24–28)
POC HCO3: 19.8 MMOL/L (ref 24–28)
POC HCO3: 27.6 MMOL/L (ref 24–28)
POC HCO3: 29.4 MMOL/L (ref 24–28)
POC HCO3: 29.7 MMOL/L (ref 24–28)
POC HCO3: 31.5 MMOL/L (ref 24–28)
POC IONIZED CALCIUM: 0.97 MMOL/L (ref 1.06–1.42)
POC IONIZED CALCIUM: 1.03 MMOL/L (ref 1.06–1.42)
POC IONIZED CALCIUM: 1.08 MMOL/L (ref 1.06–1.42)
POC IONIZED CALCIUM: 1.16 MMOL/L (ref 1.06–1.42)
POC PERFORMED BY: ABNORMAL
POC SATURATED O2: 32.8 % (ref 95–100)
POC SATURATED O2: 6.9 % (ref 95–100)
POC SATURATED O2: 99.3 % (ref 95–100)
POC SATURATED O2: 99.5 % (ref 95–100)
POC SATURATED O2: >100 % (ref 95–100)
POC SATURATED O2: >100 % (ref 95–100)
POC SET RR: 22
POC SET RR: 22
POC SET RR: 28
POC SET RR: 38
POCT GLUCOSE: 101 MG/DL (ref 70–110)
POCT GLUCOSE: 101 MG/DL (ref 70–110)
POCT GLUCOSE: 102 MG/DL (ref 70–110)
POCT GLUCOSE: 104 MG/DL (ref 70–110)
POCT GLUCOSE: 105 MG/DL (ref 70–110)
POCT GLUCOSE: 106 MG/DL (ref 70–110)
POCT GLUCOSE: 108 MG/DL (ref 70–110)
POCT GLUCOSE: 108 MG/DL (ref 70–110)
POCT GLUCOSE: 110 MG/DL (ref 70–110)
POCT GLUCOSE: 111 MG/DL (ref 70–110)
POCT GLUCOSE: 111 MG/DL (ref 70–110)
POCT GLUCOSE: 112 MG/DL (ref 70–110)
POCT GLUCOSE: 113 MG/DL (ref 70–110)
POCT GLUCOSE: 113 MG/DL (ref 70–110)
POCT GLUCOSE: 114 MG/DL (ref 70–110)
POCT GLUCOSE: 116 MG/DL (ref 70–110)
POCT GLUCOSE: 116 MG/DL (ref 70–110)
POCT GLUCOSE: 117 MG/DL (ref 70–110)
POCT GLUCOSE: 118 MG/DL (ref 70–110)
POCT GLUCOSE: 119 MG/DL (ref 70–110)
POCT GLUCOSE: 120 MG/DL (ref 70–110)
POCT GLUCOSE: 120 MG/DL (ref 70–110)
POCT GLUCOSE: 121 MG/DL (ref 70–110)
POCT GLUCOSE: 122 MG/DL (ref 70–110)
POCT GLUCOSE: 123 MG/DL (ref 70–110)
POCT GLUCOSE: 125 MG/DL (ref 70–110)
POCT GLUCOSE: 127 MG/DL (ref 70–110)
POCT GLUCOSE: 129 MG/DL (ref 70–110)
POCT GLUCOSE: 130 MG/DL (ref 70–110)
POCT GLUCOSE: 132 MG/DL (ref 70–110)
POCT GLUCOSE: 133 MG/DL (ref 70–110)
POCT GLUCOSE: 135 MG/DL (ref 70–110)
POCT GLUCOSE: 135 MG/DL (ref 70–110)
POCT GLUCOSE: 136 MG/DL (ref 70–110)
POCT GLUCOSE: 148 MG/DL (ref 70–110)
POCT GLUCOSE: 151 MG/DL (ref 70–110)
POCT GLUCOSE: 155 MG/DL (ref 70–110)
POCT GLUCOSE: 156 MG/DL (ref 70–110)
POCT GLUCOSE: 160 MG/DL (ref 70–110)
POCT GLUCOSE: 161 MG/DL (ref 70–110)
POCT GLUCOSE: 205 MG/DL (ref 70–110)
POCT GLUCOSE: 217 MG/DL (ref 70–110)
POCT GLUCOSE: 264 MG/DL (ref 70–110)
POCT GLUCOSE: 299 MG/DL (ref 70–110)
POCT GLUCOSE: 78 MG/DL (ref 70–110)
POCT GLUCOSE: 94 MG/DL (ref 70–110)
POCT GLUCOSE: 94 MG/DL (ref 70–110)
POCT GLUCOSE: 96 MG/DL (ref 70–110)
POCT GLUCOSE: 97 MG/DL (ref 70–110)
POCT GLUCOSE: >500 MG/DL (ref 70–110)
POTASSIUM BLD-SCNC: 3.2 MMOL/L (ref 3.5–5.1)
POTASSIUM BLD-SCNC: 5.1 MMOL/L (ref 3.5–5.1)
POTASSIUM BLD-SCNC: 6.2 MMOL/L (ref 3.5–5.1)
POTASSIUM BLD-SCNC: 6.2 MMOL/L (ref 3.5–5.1)
POTASSIUM SERPL-SCNC: 2.7 MMOL/L (ref 3.5–5.1)
POTASSIUM SERPL-SCNC: 2.8 MMOL/L (ref 3.5–5.1)
POTASSIUM SERPL-SCNC: 2.9 MMOL/L (ref 3.5–5.1)
POTASSIUM SERPL-SCNC: 3.2 MMOL/L (ref 3.5–5.1)
POTASSIUM SERPL-SCNC: 3.3 MMOL/L (ref 3.5–5.1)
POTASSIUM SERPL-SCNC: 3.3 MMOL/L (ref 3.5–5.1)
POTASSIUM SERPL-SCNC: 3.4 MMOL/L (ref 3.5–5.1)
POTASSIUM SERPL-SCNC: 3.4 MMOL/L (ref 3.5–5.1)
POTASSIUM SERPL-SCNC: 3.5 MMOL/L (ref 3.5–5.1)
POTASSIUM SERPL-SCNC: 3.6 MMOL/L (ref 3.5–5.1)
POTASSIUM SERPL-SCNC: 3.7 MMOL/L (ref 3.5–5.1)
POTASSIUM SERPL-SCNC: 3.8 MMOL/L (ref 3.5–5.1)
POTASSIUM SERPL-SCNC: 3.9 MMOL/L (ref 3.5–5.1)
POTASSIUM SERPL-SCNC: 3.9 MMOL/L (ref 3.5–5.1)
POTASSIUM SERPL-SCNC: 4 MMOL/L (ref 3.5–5.1)
POTASSIUM SERPL-SCNC: 4.2 MMOL/L (ref 3.5–5.1)
POTASSIUM SERPL-SCNC: 4.2 MMOL/L (ref 3.5–5.1)
POTASSIUM SERPL-SCNC: 4.4 MMOL/L (ref 3.5–5.1)
POTASSIUM SERPL-SCNC: 4.5 MMOL/L (ref 3.5–5.1)
POTASSIUM SERPL-SCNC: 4.6 MMOL/L (ref 3.5–5.1)
POTASSIUM SERPL-SCNC: 4.8 MMOL/L (ref 3.5–5.1)
POTASSIUM SERPL-SCNC: 5 MMOL/L (ref 3.5–5.1)
POTASSIUM SERPL-SCNC: 5.1 MMOL/L (ref 3.5–5.1)
POTASSIUM SERPL-SCNC: 5.4 MMOL/L (ref 3.5–5.1)
POTASSIUM SERPL-SCNC: 6.1 MMOL/L (ref 3.5–5.1)
POTASSIUM SERPL-SCNC: 6.3 MMOL/L (ref 3.5–5.1)
POTASSIUM SERPL-SCNC: 6.5 MMOL/L (ref 3.5–5.1)
POTASSIUM SERPL-SCNC: 6.6 MMOL/L (ref 3.5–5.1)
POTASSIUM UR-SCNC: 86 MMOL/L (ref 15–95)
PROT SERPL-MCNC: 6.2 GM/DL (ref 6–8.4)
PROT SERPL-MCNC: 6.5 GM/DL (ref 6–8.4)
PROT SERPL-MCNC: 6.6 GM/DL (ref 6–8.4)
PROT SERPL-MCNC: 7.4 GM/DL (ref 6–8.4)
PROT SERPL-MCNC: 8.6 GM/DL (ref 6–8.4)
PROT UR QL STRIP: ABNORMAL
PROTHROMBIN TIME: 12.1 SECONDS (ref 9–12.5)
PROTHROMBIN TIME: 12.5 SECONDS (ref 9–12.5)
PROTHROMBIN TIME: 12.6 SECONDS (ref 9–12.5)
PROTHROMBIN TIME: 13.5 SECONDS (ref 9–12.5)
PROTHROMBIN TIME: 15.1 SECONDS (ref 9–12.5)
PV PEAK GRADIENT: 2 MMHG
PV PEAK GRADIENT: 4 MMHG
PV PEAK VELOCITY: 0.68 M/S
PV PEAK VELOCITY: 1.05 M/S
RA PRESSURE ESTIMATED: 3 MMHG
RA PRESSURE ESTIMATED: 3 MMHG
RA VOL SYS: 27.65 ML
RA VOL SYS: 56.35 ML
RBC # BLD AUTO: 2.5 M/UL (ref 4.6–6.2)
RBC # BLD AUTO: 2.54 M/UL (ref 4.6–6.2)
RBC # BLD AUTO: 2.54 M/UL (ref 4.6–6.2)
RBC # BLD AUTO: 2.57 M/UL (ref 4.6–6.2)
RBC # BLD AUTO: 2.58 M/UL (ref 4.6–6.2)
RBC # BLD AUTO: 2.59 M/UL (ref 4.6–6.2)
RBC # BLD AUTO: 2.6 M/UL (ref 4.6–6.2)
RBC # BLD AUTO: 2.64 M/UL (ref 4.6–6.2)
RBC # BLD AUTO: 2.64 M/UL (ref 4.6–6.2)
RBC # BLD AUTO: 2.78 M/UL (ref 4.6–6.2)
RBC # BLD AUTO: 2.84 M/UL (ref 4.6–6.2)
RBC # BLD AUTO: 2.86 M/UL (ref 4.6–6.2)
RBC # BLD AUTO: 2.92 M/UL (ref 4.6–6.2)
RBC # BLD AUTO: 2.92 M/UL (ref 4.6–6.2)
RBC # BLD AUTO: 2.99 M/UL (ref 4.6–6.2)
RBC # BLD AUTO: 3 M/UL (ref 4.6–6.2)
RBC # BLD AUTO: 3.03 M/UL (ref 4.6–6.2)
RBC # BLD AUTO: 3.04 M/UL (ref 4.6–6.2)
RBC # BLD AUTO: 3.05 M/UL (ref 4.6–6.2)
RBC # BLD AUTO: 3.24 M/UL (ref 4.6–6.2)
RBC # BLD AUTO: 3.5 M/UL (ref 4.6–6.2)
RBC # BLD AUTO: 3.68 M/UL (ref 4.6–6.2)
RBC # BLD AUTO: 3.79 M/UL (ref 4.6–6.2)
RBC # BLD AUTO: 4.48 M/UL (ref 4.6–6.2)
RBC # BLD AUTO: 4.58 M/UL (ref 4.6–6.2)
RBC #/AREA URNS AUTO: >100 /HPF (ref 0–4)
RELATIVE EOSINOPHIL (OHS): 0 %
RELATIVE EOSINOPHIL (OHS): 0 %
RELATIVE EOSINOPHIL (OHS): 0.1 %
RELATIVE EOSINOPHIL (OHS): 0.3 %
RELATIVE EOSINOPHIL (OHS): 0.4 %
RELATIVE EOSINOPHIL (OHS): 0.6 %
RELATIVE EOSINOPHIL (OHS): 0.6 %
RELATIVE EOSINOPHIL (OHS): 0.8 %
RELATIVE EOSINOPHIL (OHS): 0.9 %
RELATIVE EOSINOPHIL (OHS): 0.9 %
RELATIVE EOSINOPHIL (OHS): 1 %
RELATIVE EOSINOPHIL (OHS): 1 %
RELATIVE EOSINOPHIL (OHS): 1.1 %
RELATIVE EOSINOPHIL (OHS): 1.2 %
RELATIVE EOSINOPHIL (OHS): 1.2 %
RELATIVE EOSINOPHIL (OHS): 1.3 %
RELATIVE EOSINOPHIL (OHS): 1.5 %
RELATIVE EOSINOPHIL (OHS): 2.1 %
RELATIVE LYMPHOCYTE (OHS): 10.1 % (ref 18–48)
RELATIVE LYMPHOCYTE (OHS): 10.4 % (ref 18–48)
RELATIVE LYMPHOCYTE (OHS): 10.5 % (ref 18–48)
RELATIVE LYMPHOCYTE (OHS): 11.4 % (ref 18–48)
RELATIVE LYMPHOCYTE (OHS): 12 % (ref 18–48)
RELATIVE LYMPHOCYTE (OHS): 12.6 % (ref 18–48)
RELATIVE LYMPHOCYTE (OHS): 14.1 % (ref 18–48)
RELATIVE LYMPHOCYTE (OHS): 15.4 % (ref 18–48)
RELATIVE LYMPHOCYTE (OHS): 21.4 % (ref 18–48)
RELATIVE LYMPHOCYTE (OHS): 3.5 % (ref 18–48)
RELATIVE LYMPHOCYTE (OHS): 4.3 % (ref 18–48)
RELATIVE LYMPHOCYTE (OHS): 4.8 % (ref 18–48)
RELATIVE LYMPHOCYTE (OHS): 5.4 % (ref 18–48)
RELATIVE LYMPHOCYTE (OHS): 6.8 % (ref 18–48)
RELATIVE LYMPHOCYTE (OHS): 7 % (ref 18–48)
RELATIVE LYMPHOCYTE (OHS): 7.3 % (ref 18–48)
RELATIVE LYMPHOCYTE (OHS): 7.9 % (ref 18–48)
RELATIVE LYMPHOCYTE (OHS): 8.1 % (ref 18–48)
RELATIVE LYMPHOCYTE (OHS): 8.6 % (ref 18–48)
RELATIVE LYMPHOCYTE (OHS): 8.8 % (ref 18–48)
RELATIVE LYMPHOCYTE (OHS): 9 % (ref 18–48)
RELATIVE LYMPHOCYTE (OHS): 9.2 % (ref 18–48)
RELATIVE LYMPHOCYTE (OHS): 9.5 % (ref 18–48)
RELATIVE LYMPHOCYTE (OHS): 9.7 % (ref 18–48)
RELATIVE LYMPHOCYTE (OHS): 9.8 % (ref 18–48)
RELATIVE MONOCYTE (OHS): 10.1 % (ref 4–15)
RELATIVE MONOCYTE (OHS): 10.6 % (ref 4–15)
RELATIVE MONOCYTE (OHS): 10.8 % (ref 4–15)
RELATIVE MONOCYTE (OHS): 11.3 % (ref 4–15)
RELATIVE MONOCYTE (OHS): 11.3 % (ref 4–15)
RELATIVE MONOCYTE (OHS): 13.2 % (ref 4–15)
RELATIVE MONOCYTE (OHS): 4.4 % (ref 4–15)
RELATIVE MONOCYTE (OHS): 4.5 % (ref 4–15)
RELATIVE MONOCYTE (OHS): 5 % (ref 4–15)
RELATIVE MONOCYTE (OHS): 6.1 % (ref 4–15)
RELATIVE MONOCYTE (OHS): 6.3 % (ref 4–15)
RELATIVE MONOCYTE (OHS): 6.4 % (ref 4–15)
RELATIVE MONOCYTE (OHS): 6.7 % (ref 4–15)
RELATIVE MONOCYTE (OHS): 6.8 % (ref 4–15)
RELATIVE MONOCYTE (OHS): 7.3 % (ref 4–15)
RELATIVE MONOCYTE (OHS): 7.3 % (ref 4–15)
RELATIVE MONOCYTE (OHS): 7.4 % (ref 4–15)
RELATIVE MONOCYTE (OHS): 7.6 % (ref 4–15)
RELATIVE MONOCYTE (OHS): 8 % (ref 4–15)
RELATIVE MONOCYTE (OHS): 8.3 % (ref 4–15)
RELATIVE MONOCYTE (OHS): 8.4 % (ref 4–15)
RELATIVE MONOCYTE (OHS): 8.5 % (ref 4–15)
RELATIVE MONOCYTE (OHS): 8.9 % (ref 4–15)
RELATIVE MONOCYTE (OHS): 9 % (ref 4–15)
RELATIVE MONOCYTE (OHS): 9.4 % (ref 4–15)
RELATIVE NEUTROPHIL (OHS): 68.8 % (ref 38–73)
RELATIVE NEUTROPHIL (OHS): 73.6 % (ref 38–73)
RELATIVE NEUTROPHIL (OHS): 74.4 % (ref 38–73)
RELATIVE NEUTROPHIL (OHS): 75 % (ref 38–73)
RELATIVE NEUTROPHIL (OHS): 75.3 % (ref 38–73)
RELATIVE NEUTROPHIL (OHS): 76.4 % (ref 38–73)
RELATIVE NEUTROPHIL (OHS): 76.5 % (ref 38–73)
RELATIVE NEUTROPHIL (OHS): 77 % (ref 38–73)
RELATIVE NEUTROPHIL (OHS): 78.4 % (ref 38–73)
RELATIVE NEUTROPHIL (OHS): 78.9 % (ref 38–73)
RELATIVE NEUTROPHIL (OHS): 79.7 % (ref 38–73)
RELATIVE NEUTROPHIL (OHS): 80.4 % (ref 38–73)
RELATIVE NEUTROPHIL (OHS): 81.9 % (ref 38–73)
RELATIVE NEUTROPHIL (OHS): 82.1 % (ref 38–73)
RELATIVE NEUTROPHIL (OHS): 82.5 % (ref 38–73)
RELATIVE NEUTROPHIL (OHS): 82.7 % (ref 38–73)
RELATIVE NEUTROPHIL (OHS): 83.1 % (ref 38–73)
RELATIVE NEUTROPHIL (OHS): 83.3 % (ref 38–73)
RELATIVE NEUTROPHIL (OHS): 83.8 % (ref 38–73)
RELATIVE NEUTROPHIL (OHS): 84.7 % (ref 38–73)
RELATIVE NEUTROPHIL (OHS): 84.7 % (ref 38–73)
RELATIVE NEUTROPHIL (OHS): 85.2 % (ref 38–73)
RELATIVE NEUTROPHIL (OHS): 86.3 % (ref 38–73)
RELATIVE NEUTROPHIL (OHS): 87 % (ref 38–73)
RELATIVE NEUTROPHIL (OHS): 88.3 % (ref 38–73)
RENIN PLAS-CCNC: 18.6 NG/ML/HR
RH BLD: NORMAL
RH BLD: NORMAL
RIGHT ATRIAL AREA: 12.5 CM2
RIGHT ATRIAL AREA: 18.3 CM2
RIGHT ATRIUM END SYSTOLIC VOLUME APICAL 4 CHAMBER INDEX BSA: 13.31 ML/M2
RIGHT ATRIUM END SYSTOLIC VOLUME APICAL 4 CHAMBER INDEX BSA: 28.51 ML/M2
RIGHT ATRIUM VOLUME AREA LENGTH APICAL 4 CHAMBER: 26.62 ML
RIGHT ATRIUM VOLUME AREA LENGTH APICAL 4 CHAMBER: 55.02 ML
RIGHT VENTRICLE DIASTOLIC BASEL DIMENSION: 3 CM
RIGHT VENTRICLE DIASTOLIC BASEL DIMENSION: 4.4 CM
RV TB RVSP: 6 MMHG
RV TISSUE DOPPLER FREE WALL SYSTOLIC VELOCITY 1 (APICAL 4 CHAMBER VIEW): 15.01 CM/S
RV TISSUE DOPPLER FREE WALL SYSTOLIC VELOCITY 1 (APICAL 4 CHAMBER VIEW): 6.85 CM/S
SINUS: 3.68 CM
SINUS: 3.9 CM
SODIUM BLD-SCNC: 129 MMOL/L (ref 136–145)
SODIUM BLD-SCNC: 131 MMOL/L (ref 136–145)
SODIUM BLD-SCNC: 133 MMOL/L (ref 136–145)
SODIUM BLD-SCNC: 133 MMOL/L (ref 136–145)
SODIUM SERPL-SCNC: 129 MMOL/L (ref 136–145)
SODIUM SERPL-SCNC: 130 MMOL/L (ref 136–145)
SODIUM SERPL-SCNC: 131 MMOL/L (ref 136–145)
SODIUM SERPL-SCNC: 132 MMOL/L (ref 136–145)
SODIUM SERPL-SCNC: 133 MMOL/L (ref 136–145)
SODIUM SERPL-SCNC: 133 MMOL/L (ref 136–145)
SODIUM SERPL-SCNC: 134 MMOL/L (ref 136–145)
SODIUM SERPL-SCNC: 135 MMOL/L (ref 136–145)
SODIUM SERPL-SCNC: 137 MMOL/L (ref 136–145)
SODIUM SERPL-SCNC: 138 MMOL/L (ref 136–145)
SODIUM SERPL-SCNC: 140 MMOL/L (ref 136–145)
SODIUM SERPL-SCNC: 141 MMOL/L (ref 136–145)
SODIUM SERPL-SCNC: 141 MMOL/L (ref 136–145)
SP GR UR STRIP: 1.02
SPECIMEN OUTDATE: NORMAL
SPECIMEN OUTDATE: NORMAL
SPECIMEN SOURCE: ABNORMAL
STJ: 2.9 CM
T4 FREE SERPL-MCNC: 0.73 NG/DL (ref 0.71–1.51)
TDI LATERAL: 0.06 M/S
TDI LATERAL: 0.06 M/S
TDI SEPTAL: 0.05 M/S
TDI: 0.06 M/S
TIBC SERPL-MCNC: 232 UG/DL (ref 250–450)
TR MAX PG: 34 MMHG
TRANSFERRIN SERPL-MCNC: 157 MG/DL (ref 200–375)
TRICUSPID ANNULAR PLANE SYSTOLIC EXCURSION: 0.8 CM
TRICUSPID ANNULAR PLANE SYSTOLIC EXCURSION: 2.3 CM
TROPONIN I SERPL DL<=0.01 NG/ML-MCNC: 0.02 NG/ML
TROPONIN I SERPL DL<=0.01 NG/ML-MCNC: 0.14 NG/ML
TROPONIN I SERPL DL<=0.01 NG/ML-MCNC: 0.14 NG/ML
TROPONIN I SERPL DL<=0.01 NG/ML-MCNC: 0.15 NG/ML
TROPONIN I SERPL DL<=0.01 NG/ML-MCNC: 0.16 NG/ML
TROPONIN I SERPL DL<=0.01 NG/ML-MCNC: 0.35 NG/ML
TROPONIN I SERPL DL<=0.01 NG/ML-MCNC: 1.67 NG/ML
TSH SERPL-ACNC: 14.53 UIU/ML (ref 0.4–4)
TV REST PULMONARY ARTERY PRESSURE: 37 MMHG
UNIT NUMBER: NORMAL
UROBILINOGEN UR STRIP-ACNC: NEGATIVE EU/DL
VIT B12 SERPL-MCNC: 221 PG/ML (ref 210–950)
VT: 420
VT: 450
VT: 450
VT: 480
WBC # BLD AUTO: 10.04 K/UL (ref 3.9–12.7)
WBC # BLD AUTO: 10.23 K/UL (ref 3.9–12.7)
WBC # BLD AUTO: 10.52 K/UL (ref 3.9–12.7)
WBC # BLD AUTO: 10.74 K/UL (ref 3.9–12.7)
WBC # BLD AUTO: 10.79 K/UL (ref 3.9–12.7)
WBC # BLD AUTO: 10.82 K/UL (ref 3.9–12.7)
WBC # BLD AUTO: 11.37 K/UL (ref 3.9–12.7)
WBC # BLD AUTO: 12.52 K/UL (ref 3.9–12.7)
WBC # BLD AUTO: 12.81 K/UL (ref 3.9–12.7)
WBC # BLD AUTO: 13.86 K/UL (ref 3.9–12.7)
WBC # BLD AUTO: 13.87 K/UL (ref 3.9–12.7)
WBC # BLD AUTO: 20.37 K/UL (ref 3.9–12.7)
WBC # BLD AUTO: 7.72 K/UL (ref 3.9–12.7)
WBC # BLD AUTO: 8.18 K/UL (ref 3.9–12.7)
WBC # BLD AUTO: 8.25 K/UL (ref 3.9–12.7)
WBC # BLD AUTO: 8.27 K/UL (ref 3.9–12.7)
WBC # BLD AUTO: 8.33 K/UL (ref 3.9–12.7)
WBC # BLD AUTO: 8.4 K/UL (ref 3.9–12.7)
WBC # BLD AUTO: 8.48 K/UL (ref 3.9–12.7)
WBC # BLD AUTO: 8.9 K/UL (ref 3.9–12.7)
WBC # BLD AUTO: 8.95 K/UL (ref 3.9–12.7)
WBC # BLD AUTO: 9.37 K/UL (ref 3.9–12.7)
WBC # BLD AUTO: 9.8 K/UL (ref 3.9–12.7)
WBC # BLD AUTO: 9.85 K/UL (ref 3.9–12.7)
WBC # BLD AUTO: 9.9 K/UL (ref 3.9–12.7)
WBC # FLD: 2726 /CU MM
WBC #/AREA URNS AUTO: 43 /HPF (ref 0–5)
Z-SCORE OF LEFT VENTRICULAR DIMENSION IN END DIASTOLE: -2.4
Z-SCORE OF LEFT VENTRICULAR DIMENSION IN END DIASTOLE: -2.81
Z-SCORE OF LEFT VENTRICULAR DIMENSION IN END DIASTOLE: 1.09
Z-SCORE OF LEFT VENTRICULAR DIMENSION IN END SYSTOLE: 0.05
Z-SCORE OF LEFT VENTRICULAR DIMENSION IN END SYSTOLE: 0.32
Z-SCORE OF LEFT VENTRICULAR DIMENSION IN END SYSTOLE: 2.64

## 2025-01-01 PROCEDURE — 99291 CRITICAL CARE FIRST HOUR: CPT | Mod: ,,, | Performed by: STUDENT IN AN ORGANIZED HEALTH CARE EDUCATION/TRAINING PROGRAM

## 2025-01-01 PROCEDURE — 83605 ASSAY OF LACTIC ACID: CPT

## 2025-01-01 PROCEDURE — 25000003 PHARM REV CODE 250: Performed by: REGISTERED NURSE

## 2025-01-01 PROCEDURE — 93010 ELECTROCARDIOGRAM REPORT: CPT | Mod: ,,, | Performed by: INTERNAL MEDICINE

## 2025-01-01 PROCEDURE — 84132 ASSAY OF SERUM POTASSIUM: CPT

## 2025-01-01 PROCEDURE — 83735 ASSAY OF MAGNESIUM: CPT | Performed by: STUDENT IN AN ORGANIZED HEALTH CARE EDUCATION/TRAINING PROGRAM

## 2025-01-01 PROCEDURE — 36415 COLL VENOUS BLD VENIPUNCTURE: CPT | Performed by: STUDENT IN AN ORGANIZED HEALTH CARE EDUCATION/TRAINING PROGRAM

## 2025-01-01 PROCEDURE — 25000003 PHARM REV CODE 250: Performed by: STUDENT IN AN ORGANIZED HEALTH CARE EDUCATION/TRAINING PROGRAM

## 2025-01-01 PROCEDURE — 27000190 HC CPAP FULL FACE MASK W/VALVE

## 2025-01-01 PROCEDURE — 84100 ASSAY OF PHOSPHORUS: CPT | Performed by: INTERNAL MEDICINE

## 2025-01-01 PROCEDURE — 99900035 HC TECH TIME PER 15 MIN (STAT)

## 2025-01-01 PROCEDURE — 82607 VITAMIN B-12: CPT | Performed by: STUDENT IN AN ORGANIZED HEALTH CARE EDUCATION/TRAINING PROGRAM

## 2025-01-01 PROCEDURE — 82330 ASSAY OF CALCIUM: CPT

## 2025-01-01 PROCEDURE — 84484 ASSAY OF TROPONIN QUANT: CPT | Performed by: EMERGENCY MEDICINE

## 2025-01-01 PROCEDURE — 84132 ASSAY OF SERUM POTASSIUM: CPT | Performed by: INTERNAL MEDICINE

## 2025-01-01 PROCEDURE — 85730 THROMBOPLASTIN TIME PARTIAL: CPT | Performed by: EMERGENCY MEDICINE

## 2025-01-01 PROCEDURE — 92526 ORAL FUNCTION THERAPY: CPT

## 2025-01-01 PROCEDURE — 89051 BODY FLUID CELL COUNT: CPT

## 2025-01-01 PROCEDURE — 85025 COMPLETE CBC W/AUTO DIFF WBC: CPT | Performed by: EMERGENCY MEDICINE

## 2025-01-01 PROCEDURE — 71000033 HC RECOVERY, INTIAL HOUR

## 2025-01-01 PROCEDURE — 63600175 PHARM REV CODE 636 W HCPCS

## 2025-01-01 PROCEDURE — 25000003 PHARM REV CODE 250: Performed by: INTERNAL MEDICINE

## 2025-01-01 PROCEDURE — 83735 ASSAY OF MAGNESIUM: CPT | Performed by: FAMILY MEDICINE

## 2025-01-01 PROCEDURE — 25000003 PHARM REV CODE 250

## 2025-01-01 PROCEDURE — 63600175 PHARM REV CODE 636 W HCPCS: Performed by: INTERNAL MEDICINE

## 2025-01-01 PROCEDURE — 80069 RENAL FUNCTION PANEL: CPT | Performed by: INTERNAL MEDICINE

## 2025-01-01 PROCEDURE — 11000001 HC ACUTE MED/SURG PRIVATE ROOM

## 2025-01-01 PROCEDURE — 82570 ASSAY OF URINE CREATININE: CPT | Performed by: STUDENT IN AN ORGANIZED HEALTH CARE EDUCATION/TRAINING PROGRAM

## 2025-01-01 PROCEDURE — 63600175 PHARM REV CODE 636 W HCPCS: Performed by: STUDENT IN AN ORGANIZED HEALTH CARE EDUCATION/TRAINING PROGRAM

## 2025-01-01 PROCEDURE — 93005 ELECTROCARDIOGRAM TRACING: CPT

## 2025-01-01 PROCEDURE — 82803 BLOOD GASES ANY COMBINATION: CPT

## 2025-01-01 PROCEDURE — 80048 BASIC METABOLIC PNL TOTAL CA: CPT

## 2025-01-01 PROCEDURE — 81001 URINALYSIS AUTO W/SCOPE: CPT | Performed by: INTERNAL MEDICINE

## 2025-01-01 PROCEDURE — 25000003 PHARM REV CODE 250: Performed by: EMERGENCY MEDICINE

## 2025-01-01 PROCEDURE — 85610 PROTHROMBIN TIME: CPT | Performed by: EMERGENCY MEDICINE

## 2025-01-01 PROCEDURE — 85730 THROMBOPLASTIN TIME PARTIAL: CPT | Performed by: STUDENT IN AN ORGANIZED HEALTH CARE EDUCATION/TRAINING PROGRAM

## 2025-01-01 PROCEDURE — 84484 ASSAY OF TROPONIN QUANT: CPT | Performed by: FAMILY MEDICINE

## 2025-01-01 PROCEDURE — 85610 PROTHROMBIN TIME: CPT | Performed by: INTERNAL MEDICINE

## 2025-01-01 PROCEDURE — 90935 HEMODIALYSIS ONE EVALUATION: CPT

## 2025-01-01 PROCEDURE — 99291 CRITICAL CARE FIRST HOUR: CPT | Mod: ,,, | Performed by: INTERNAL MEDICINE

## 2025-01-01 PROCEDURE — 85014 HEMATOCRIT: CPT

## 2025-01-01 PROCEDURE — 03763DZ DILATION OF LEFT AXILLARY ARTERY WITH INTRALUMINAL DEVICE, PERCUTANEOUS APPROACH: ICD-10-PCS | Performed by: STUDENT IN AN ORGANIZED HEALTH CARE EDUCATION/TRAINING PROGRAM

## 2025-01-01 PROCEDURE — 36415 COLL VENOUS BLD VENIPUNCTURE: CPT | Performed by: EMERGENCY MEDICINE

## 2025-01-01 PROCEDURE — 85025 COMPLETE CBC W/AUTO DIFF WBC: CPT | Performed by: STUDENT IN AN ORGANIZED HEALTH CARE EDUCATION/TRAINING PROGRAM

## 2025-01-01 PROCEDURE — 27100171 HC OXYGEN HIGH FLOW UP TO 24 HOURS

## 2025-01-01 PROCEDURE — 83520 IMMUNOASSAY QUANT NOS NONAB: CPT | Performed by: INTERNAL MEDICINE

## 2025-01-01 PROCEDURE — 83735 ASSAY OF MAGNESIUM: CPT

## 2025-01-01 PROCEDURE — 36620 INSERTION CATHETER ARTERY: CPT

## 2025-01-01 PROCEDURE — 85730 THROMBOPLASTIN TIME PARTIAL: CPT | Performed by: INTERNAL MEDICINE

## 2025-01-01 PROCEDURE — 25000003 PHARM REV CODE 250: Performed by: NURSE PRACTITIONER

## 2025-01-01 PROCEDURE — 36620 INSERTION CATHETER ARTERY: CPT | Mod: ,,, | Performed by: ANESTHESIOLOGY

## 2025-01-01 PROCEDURE — 36600 WITHDRAWAL OF ARTERIAL BLOOD: CPT

## 2025-01-01 PROCEDURE — 84484 ASSAY OF TROPONIN QUANT: CPT | Performed by: STUDENT IN AN ORGANIZED HEALTH CARE EDUCATION/TRAINING PROGRAM

## 2025-01-01 PROCEDURE — 82746 ASSAY OF FOLIC ACID SERUM: CPT | Performed by: STUDENT IN AN ORGANIZED HEALTH CARE EDUCATION/TRAINING PROGRAM

## 2025-01-01 PROCEDURE — 97530 THERAPEUTIC ACTIVITIES: CPT

## 2025-01-01 PROCEDURE — 63600175 PHARM REV CODE 636 W HCPCS: Performed by: ANESTHESIOLOGY

## 2025-01-01 PROCEDURE — 80100014 HC HEMODIALYSIS 1:1

## 2025-01-01 PROCEDURE — 84295 ASSAY OF SERUM SODIUM: CPT

## 2025-01-01 PROCEDURE — 36556 INSERT NON-TUNNEL CV CATH: CPT

## 2025-01-01 PROCEDURE — 97535 SELF CARE MNGMENT TRAINING: CPT

## 2025-01-01 PROCEDURE — 5A09457 ASSISTANCE WITH RESPIRATORY VENTILATION, 24-96 CONSECUTIVE HOURS, CONTINUOUS POSITIVE AIRWAY PRESSURE: ICD-10-PCS | Performed by: HOSPITALIST

## 2025-01-01 PROCEDURE — 80069 RENAL FUNCTION PANEL: CPT | Performed by: STUDENT IN AN ORGANIZED HEALTH CARE EDUCATION/TRAINING PROGRAM

## 2025-01-01 PROCEDURE — 94761 N-INVAS EAR/PLS OXIMETRY MLT: CPT

## 2025-01-01 PROCEDURE — 31500 INSERT EMERGENCY AIRWAY: CPT | Performed by: ANESTHESIOLOGY

## 2025-01-01 PROCEDURE — 82533 TOTAL CORTISOL: CPT | Performed by: STUDENT IN AN ORGANIZED HEALTH CARE EDUCATION/TRAINING PROGRAM

## 2025-01-01 PROCEDURE — 83880 ASSAY OF NATRIURETIC PEPTIDE: CPT

## 2025-01-01 PROCEDURE — 27201640 HC PAD, ARTICGEL

## 2025-01-01 PROCEDURE — 92507 TX SP LANG VOICE COMM INDIV: CPT

## 2025-01-01 PROCEDURE — 37799 UNLISTED PX VASCULAR SURGERY: CPT

## 2025-01-01 PROCEDURE — 87206 SMEAR FLUORESCENT/ACID STAI: CPT

## 2025-01-01 PROCEDURE — 83735 ASSAY OF MAGNESIUM: CPT | Performed by: INTERNAL MEDICINE

## 2025-01-01 PROCEDURE — 87340 HEPATITIS B SURFACE AG IA: CPT | Performed by: INTERNAL MEDICINE

## 2025-01-01 PROCEDURE — 80048 BASIC METABOLIC PNL TOTAL CA: CPT | Performed by: STUDENT IN AN ORGANIZED HEALTH CARE EDUCATION/TRAINING PROGRAM

## 2025-01-01 PROCEDURE — 03763Z1 DILATION OF LEFT AXILLARY ARTERY USING DRUG-COATED BALLOON, PERCUTANEOUS APPROACH: ICD-10-PCS | Performed by: STUDENT IN AN ORGANIZED HEALTH CARE EDUCATION/TRAINING PROGRAM

## 2025-01-01 PROCEDURE — 63600175 PHARM REV CODE 636 W HCPCS: Performed by: HOSPITALIST

## 2025-01-01 PROCEDURE — 27000221 HC OXYGEN, UP TO 24 HOURS

## 2025-01-01 PROCEDURE — P9016 RBC LEUKOCYTES REDUCED: HCPCS | Performed by: STUDENT IN AN ORGANIZED HEALTH CARE EDUCATION/TRAINING PROGRAM

## 2025-01-01 PROCEDURE — 05C83ZZ EXTIRPATION OF MATTER FROM LEFT AXILLARY VEIN, PERCUTANEOUS APPROACH: ICD-10-PCS | Performed by: STUDENT IN AN ORGANIZED HEALTH CARE EDUCATION/TRAINING PROGRAM

## 2025-01-01 PROCEDURE — 99291 CRITICAL CARE FIRST HOUR: CPT

## 2025-01-01 PROCEDURE — 94002 VENT MGMT INPAT INIT DAY: CPT

## 2025-01-01 PROCEDURE — 99900026 HC AIRWAY MAINTENANCE (STAT)

## 2025-01-01 PROCEDURE — 87205 SMEAR GRAM STAIN: CPT

## 2025-01-01 PROCEDURE — 94660 CPAP INITIATION&MGMT: CPT

## 2025-01-01 PROCEDURE — 25500020 PHARM REV CODE 255: Performed by: STUDENT IN AN ORGANIZED HEALTH CARE EDUCATION/TRAINING PROGRAM

## 2025-01-01 PROCEDURE — 82040 ASSAY OF SERUM ALBUMIN: CPT | Performed by: INTERNAL MEDICINE

## 2025-01-01 PROCEDURE — 80048 BASIC METABOLIC PNL TOTAL CA: CPT | Performed by: INTERNAL MEDICINE

## 2025-01-01 PROCEDURE — 80053 COMPREHEN METABOLIC PANEL: CPT | Performed by: EMERGENCY MEDICINE

## 2025-01-01 PROCEDURE — 86920 COMPATIBILITY TEST SPIN: CPT | Performed by: STUDENT IN AN ORGANIZED HEALTH CARE EDUCATION/TRAINING PROGRAM

## 2025-01-01 PROCEDURE — 80100016 HC MAINTENANCE HEMODIALYSIS

## 2025-01-01 PROCEDURE — 27000513 HC SENSOR FLOTRAC

## 2025-01-01 PROCEDURE — 0BH17EZ INSERTION OF ENDOTRACHEAL AIRWAY INTO TRACHEA, VIA NATURAL OR ARTIFICIAL OPENING: ICD-10-PCS | Performed by: STUDENT IN AN ORGANIZED HEALTH CARE EDUCATION/TRAINING PROGRAM

## 2025-01-01 PROCEDURE — 82310 ASSAY OF CALCIUM: CPT | Performed by: INTERNAL MEDICINE

## 2025-01-01 PROCEDURE — 25000003 PHARM REV CODE 250: Performed by: HOSPITALIST

## 2025-01-01 PROCEDURE — 94003 VENT MGMT INPAT SUBQ DAY: CPT

## 2025-01-01 PROCEDURE — 0BH17EZ INSERTION OF ENDOTRACHEAL AIRWAY INTO TRACHEA, VIA NATURAL OR ARTIFICIAL OPENING: ICD-10-PCS | Performed by: HOSPITALIST

## 2025-01-01 PROCEDURE — 02HV33Z INSERTION OF INFUSION DEVICE INTO SUPERIOR VENA CAVA, PERCUTANEOUS APPROACH: ICD-10-PCS | Performed by: INTERNAL MEDICINE

## 2025-01-01 PROCEDURE — 36415 COLL VENOUS BLD VENIPUNCTURE: CPT

## 2025-01-01 PROCEDURE — 84100 ASSAY OF PHOSPHORUS: CPT

## 2025-01-01 PROCEDURE — 97161 PT EVAL LOW COMPLEX 20 MIN: CPT

## 2025-01-01 PROCEDURE — 84466 ASSAY OF TRANSFERRIN: CPT | Performed by: STUDENT IN AN ORGANIZED HEALTH CARE EDUCATION/TRAINING PROGRAM

## 2025-01-01 PROCEDURE — 82565 ASSAY OF CREATININE: CPT

## 2025-01-01 PROCEDURE — 63600175 PHARM REV CODE 636 W HCPCS: Performed by: NURSE PRACTITIONER

## 2025-01-01 PROCEDURE — 27201247 HC HEMODIALYSIS, SET-UP & CANCEL

## 2025-01-01 PROCEDURE — 99291 CRITICAL CARE FIRST HOUR: CPT | Mod: ,,, | Performed by: NURSE PRACTITIONER

## 2025-01-01 PROCEDURE — 82550 ASSAY OF CK (CPK): CPT | Performed by: INTERNAL MEDICINE

## 2025-01-01 PROCEDURE — 99233 SBSQ HOSP IP/OBS HIGH 50: CPT | Mod: ,,, | Performed by: NURSE PRACTITIONER

## 2025-01-01 PROCEDURE — 99233 SBSQ HOSP IP/OBS HIGH 50: CPT | Mod: 25,,, | Performed by: NURSE PRACTITIONER

## 2025-01-01 PROCEDURE — 83735 ASSAY OF MAGNESIUM: CPT | Performed by: NURSE PRACTITIONER

## 2025-01-01 PROCEDURE — 87070 CULTURE OTHR SPECIMN AEROBIC: CPT

## 2025-01-01 PROCEDURE — 86920 COMPATIBILITY TEST SPIN: CPT | Performed by: NURSE PRACTITIONER

## 2025-01-01 PROCEDURE — 99232 SBSQ HOSP IP/OBS MODERATE 35: CPT | Mod: FS,,, | Performed by: INTERNAL MEDICINE

## 2025-01-01 PROCEDURE — 27200966 HC CLOSED SUCTION SYSTEM

## 2025-01-01 PROCEDURE — 36415 COLL VENOUS BLD VENIPUNCTURE: CPT | Performed by: NURSE PRACTITIONER

## 2025-01-01 PROCEDURE — 97116 GAIT TRAINING THERAPY: CPT

## 2025-01-01 PROCEDURE — 02HV33Z INSERTION OF INFUSION DEVICE INTO SUPERIOR VENA CAVA, PERCUTANEOUS APPROACH: ICD-10-PCS | Performed by: STUDENT IN AN ORGANIZED HEALTH CARE EDUCATION/TRAINING PROGRAM

## 2025-01-01 PROCEDURE — C1751 CATH, INF, PER/CENT/MIDLINE: HCPCS

## 2025-01-01 PROCEDURE — 30233N1 TRANSFUSION OF NONAUTOLOGOUS RED BLOOD CELLS INTO PERIPHERAL VEIN, PERCUTANEOUS APPROACH: ICD-10-PCS | Performed by: HOSPITALIST

## 2025-01-01 PROCEDURE — 82310 ASSAY OF CALCIUM: CPT | Performed by: STUDENT IN AN ORGANIZED HEALTH CARE EDUCATION/TRAINING PROGRAM

## 2025-01-01 PROCEDURE — 20000000 HC ICU ROOM

## 2025-01-01 PROCEDURE — 86850 RBC ANTIBODY SCREEN: CPT | Performed by: NURSE PRACTITIONER

## 2025-01-01 PROCEDURE — 03C63ZZ EXTIRPATION OF MATTER FROM LEFT AXILLARY ARTERY, PERCUTANEOUS APPROACH: ICD-10-PCS | Performed by: STUDENT IN AN ORGANIZED HEALTH CARE EDUCATION/TRAINING PROGRAM

## 2025-01-01 PROCEDURE — 82040 ASSAY OF SERUM ALBUMIN: CPT | Performed by: STUDENT IN AN ORGANIZED HEALTH CARE EDUCATION/TRAINING PROGRAM

## 2025-01-01 PROCEDURE — 84439 ASSAY OF FREE THYROXINE: CPT | Performed by: STUDENT IN AN ORGANIZED HEALTH CARE EDUCATION/TRAINING PROGRAM

## 2025-01-01 PROCEDURE — 85610 PROTHROMBIN TIME: CPT | Performed by: STUDENT IN AN ORGANIZED HEALTH CARE EDUCATION/TRAINING PROGRAM

## 2025-01-01 PROCEDURE — 31500 INSERT EMERGENCY AIRWAY: CPT

## 2025-01-01 PROCEDURE — 94761 N-INVAS EAR/PLS OXIMETRY MLT: CPT | Mod: XB

## 2025-01-01 PROCEDURE — 63600175 PHARM REV CODE 636 W HCPCS: Performed by: EMERGENCY MEDICINE

## 2025-01-01 PROCEDURE — 5A1945Z RESPIRATORY VENTILATION, 24-96 CONSECUTIVE HOURS: ICD-10-PCS | Performed by: STUDENT IN AN ORGANIZED HEALTH CARE EDUCATION/TRAINING PROGRAM

## 2025-01-01 PROCEDURE — 36415 COLL VENOUS BLD VENIPUNCTURE: CPT | Performed by: INTERNAL MEDICINE

## 2025-01-01 PROCEDURE — 88112 CYTOPATH CELL ENHANCE TECH: CPT | Mod: TC

## 2025-01-01 PROCEDURE — 86901 BLOOD TYPING SEROLOGIC RH(D): CPT | Performed by: STUDENT IN AN ORGANIZED HEALTH CARE EDUCATION/TRAINING PROGRAM

## 2025-01-01 PROCEDURE — 97129 THER IVNTJ 1ST 15 MIN: CPT

## 2025-01-01 PROCEDURE — 5A1935Z RESPIRATORY VENTILATION, LESS THAN 24 CONSECUTIVE HOURS: ICD-10-PCS | Performed by: HOSPITALIST

## 2025-01-01 PROCEDURE — 99223 1ST HOSP IP/OBS HIGH 75: CPT | Mod: 25,,, | Performed by: STUDENT IN AN ORGANIZED HEALTH CARE EDUCATION/TRAINING PROGRAM

## 2025-01-01 PROCEDURE — 83880 ASSAY OF NATRIURETIC PEPTIDE: CPT | Performed by: EMERGENCY MEDICINE

## 2025-01-01 PROCEDURE — 86706 HEP B SURFACE ANTIBODY: CPT | Performed by: INTERNAL MEDICINE

## 2025-01-01 PROCEDURE — 99291 CRITICAL CARE FIRST HOUR: CPT | Mod: 25,,, | Performed by: STUDENT IN AN ORGANIZED HEALTH CARE EDUCATION/TRAINING PROGRAM

## 2025-01-01 PROCEDURE — 87075 CULTR BACTERIA EXCEPT BLOOD: CPT

## 2025-01-01 PROCEDURE — 99233 SBSQ HOSP IP/OBS HIGH 50: CPT | Mod: ,,, | Performed by: STUDENT IN AN ORGANIZED HEALTH CARE EDUCATION/TRAINING PROGRAM

## 2025-01-01 PROCEDURE — 94660 CPAP INITIATION&MGMT: CPT | Mod: XB

## 2025-01-01 PROCEDURE — 87116 MYCOBACTERIA CULTURE: CPT

## 2025-01-01 PROCEDURE — 99233 SBSQ HOSP IP/OBS HIGH 50: CPT | Mod: 25,NSCH,, | Performed by: PHYSICIAN ASSISTANT

## 2025-01-01 PROCEDURE — 25000242 PHARM REV CODE 250 ALT 637 W/ HCPCS

## 2025-01-01 PROCEDURE — 92610 EVALUATE SWALLOWING FUNCTION: CPT

## 2025-01-01 PROCEDURE — 5A1D70Z PERFORMANCE OF URINARY FILTRATION, INTERMITTENT, LESS THAN 6 HOURS PER DAY: ICD-10-PCS | Performed by: INTERNAL MEDICINE

## 2025-01-01 PROCEDURE — P9016 RBC LEUKOCYTES REDUCED: HCPCS | Performed by: NURSE PRACTITIONER

## 2025-01-01 PROCEDURE — 84075 ASSAY ALKALINE PHOSPHATASE: CPT

## 2025-01-01 PROCEDURE — 83605 ASSAY OF LACTIC ACID: CPT | Performed by: INTERNAL MEDICINE

## 2025-01-01 PROCEDURE — 5A12012 PERFORMANCE OF CARDIAC OUTPUT, SINGLE, MANUAL: ICD-10-PCS | Performed by: STUDENT IN AN ORGANIZED HEALTH CARE EDUCATION/TRAINING PROGRAM

## 2025-01-01 PROCEDURE — 83605 ASSAY OF LACTIC ACID: CPT | Performed by: STUDENT IN AN ORGANIZED HEALTH CARE EDUCATION/TRAINING PROGRAM

## 2025-01-01 PROCEDURE — 85025 COMPLETE CBC W/AUTO DIFF WBC: CPT

## 2025-01-01 PROCEDURE — 84133 ASSAY OF URINE POTASSIUM: CPT | Performed by: STUDENT IN AN ORGANIZED HEALTH CARE EDUCATION/TRAINING PROGRAM

## 2025-01-01 PROCEDURE — 0W9D3ZZ DRAINAGE OF PERICARDIAL CAVITY, PERCUTANEOUS APPROACH: ICD-10-PCS | Performed by: INTERNAL MEDICINE

## 2025-01-01 PROCEDURE — 97167 OT EVAL HIGH COMPLEX 60 MIN: CPT

## 2025-01-01 PROCEDURE — 84244 ASSAY OF RENIN: CPT

## 2025-01-01 PROCEDURE — 27000923 HC TRIALYSIS CATHETER, ANY SIZE

## 2025-01-01 PROCEDURE — 82728 ASSAY OF FERRITIN: CPT | Performed by: STUDENT IN AN ORGANIZED HEALTH CARE EDUCATION/TRAINING PROGRAM

## 2025-01-01 PROCEDURE — 84484 ASSAY OF TROPONIN QUANT: CPT

## 2025-01-01 PROCEDURE — 80053 COMPREHEN METABOLIC PANEL: CPT | Performed by: STUDENT IN AN ORGANIZED HEALTH CARE EDUCATION/TRAINING PROGRAM

## 2025-01-01 RX ORDER — IBUPROFEN 200 MG
16 TABLET ORAL
Status: DISCONTINUED | OUTPATIENT
Start: 2025-01-01 | End: 2025-01-01 | Stop reason: HOSPADM

## 2025-01-01 RX ORDER — AMIODARONE HYDROCHLORIDE 200 MG/1
200 TABLET ORAL 2 TIMES DAILY
Status: DISCONTINUED | OUTPATIENT
Start: 2025-01-01 | End: 2025-01-01

## 2025-01-01 RX ORDER — ATORVASTATIN CALCIUM 40 MG/1
40 TABLET, FILM COATED ORAL NIGHTLY
Status: DISCONTINUED | OUTPATIENT
Start: 2025-01-01 | End: 2025-01-01

## 2025-01-01 RX ORDER — AMIODARONE HYDROCHLORIDE 200 MG/1
200 TABLET ORAL DAILY
Status: DISCONTINUED | OUTPATIENT
Start: 2025-06-27 | End: 2025-01-01 | Stop reason: HOSPADM

## 2025-01-01 RX ORDER — POTASSIUM CHLORIDE 29.8 MG/ML
40 INJECTION INTRAVENOUS ONCE
Status: COMPLETED | OUTPATIENT
Start: 2025-01-01 | End: 2025-01-01

## 2025-01-01 RX ORDER — LIDOCAINE HYDROCHLORIDE 10 MG/ML
INJECTION, SOLUTION INFILTRATION; PERINEURAL
Status: COMPLETED | OUTPATIENT
Start: 2025-01-01 | End: 2025-01-01

## 2025-01-01 RX ORDER — POTASSIUM CHLORIDE 7.45 MG/ML
10 INJECTION INTRAVENOUS
Status: DISCONTINUED | OUTPATIENT
Start: 2025-01-01 | End: 2025-01-01

## 2025-01-01 RX ORDER — ETOMIDATE 2 MG/ML
INJECTION INTRAVENOUS
Status: DISCONTINUED
Start: 2025-01-01 | End: 2025-01-01 | Stop reason: WASHOUT

## 2025-01-01 RX ORDER — METOPROLOL SUCCINATE 50 MG/1
100 TABLET, EXTENDED RELEASE ORAL 2 TIMES DAILY
Status: DISCONTINUED | OUTPATIENT
Start: 2025-01-01 | End: 2025-01-01

## 2025-01-01 RX ORDER — NOREPINEPHRINE BITARTRATE/D5W 8 MG/250ML
0-3 PLASTIC BAG, INJECTION (ML) INTRAVENOUS CONTINUOUS
Status: DISCONTINUED | OUTPATIENT
Start: 2025-01-01 | End: 2025-01-01

## 2025-01-01 RX ORDER — CALCIUM GLUCONATE 20 MG/ML
1 INJECTION, SOLUTION INTRAVENOUS EVERY 10 MIN PRN
Status: DISCONTINUED | OUTPATIENT
Start: 2025-01-01 | End: 2025-01-01 | Stop reason: HOSPADM

## 2025-01-01 RX ORDER — HYDROMORPHONE HYDROCHLORIDE 1 MG/ML
0.2 INJECTION, SOLUTION INTRAMUSCULAR; INTRAVENOUS; SUBCUTANEOUS ONCE
Status: COMPLETED | OUTPATIENT
Start: 2025-01-01 | End: 2025-01-01

## 2025-01-01 RX ORDER — NOREPINEPHRINE BITARTRATE/D5W 4MG/250ML
0-3 PLASTIC BAG, INJECTION (ML) INTRAVENOUS CONTINUOUS
Status: DISCONTINUED | OUTPATIENT
Start: 2025-01-01 | End: 2025-01-01

## 2025-01-01 RX ORDER — AMIODARONE HYDROCHLORIDE 200 MG/1
400 TABLET ORAL 2 TIMES DAILY
Status: DISCONTINUED | OUTPATIENT
Start: 2025-01-01 | End: 2025-01-01

## 2025-01-01 RX ORDER — ALBUTEROL SULFATE 2.5 MG/.5ML
10 SOLUTION RESPIRATORY (INHALATION) ONCE
Status: COMPLETED | OUTPATIENT
Start: 2025-01-01 | End: 2025-01-01

## 2025-01-01 RX ORDER — HEPARIN SODIUM,PORCINE/D5W 25000/250
0-40 INTRAVENOUS SOLUTION INTRAVENOUS CONTINUOUS
Status: DISCONTINUED | OUTPATIENT
Start: 2025-01-01 | End: 2025-01-01

## 2025-01-01 RX ORDER — AMIODARONE HYDROCHLORIDE 200 MG/1
200 TABLET ORAL DAILY
Status: DISCONTINUED | OUTPATIENT
Start: 2025-01-01 | End: 2025-01-01

## 2025-01-01 RX ORDER — SEVELAMER CARBONATE 0.8 G/1
1.6 POWDER, FOR SUSPENSION ORAL 3 TIMES DAILY
Status: DISCONTINUED | OUTPATIENT
Start: 2025-01-01 | End: 2025-01-01

## 2025-01-01 RX ORDER — HYDROCODONE BITARTRATE AND ACETAMINOPHEN 500; 5 MG/1; MG/1
TABLET ORAL CONTINUOUS
Status: DISCONTINUED | OUTPATIENT
Start: 2025-01-01 | End: 2025-01-01 | Stop reason: HOSPADM

## 2025-01-01 RX ORDER — PROPOFOL 10 MG/ML
INJECTION, EMULSION INTRAVENOUS
Status: DISCONTINUED
Start: 2025-01-01 | End: 2025-01-01 | Stop reason: WASHOUT

## 2025-01-01 RX ORDER — SEVELAMER CARBONATE 800 MG/1
1600 TABLET, FILM COATED ORAL
Status: DISCONTINUED | OUTPATIENT
Start: 2025-01-01 | End: 2025-01-01 | Stop reason: HOSPADM

## 2025-01-01 RX ORDER — ETOMIDATE 2 MG/ML
INJECTION INTRAVENOUS
Status: DISPENSED
Start: 2025-01-01 | End: 2025-01-01

## 2025-01-01 RX ORDER — LORAZEPAM 2 MG/ML
2 INJECTION INTRAMUSCULAR
Status: DISCONTINUED | OUTPATIENT
Start: 2025-01-01 | End: 2025-01-01 | Stop reason: HOSPADM

## 2025-01-01 RX ORDER — HYDROXYZINE PAMOATE 25 MG/1
25 CAPSULE ORAL ONCE
Status: COMPLETED | OUTPATIENT
Start: 2025-01-01 | End: 2025-01-01

## 2025-01-01 RX ORDER — MUPIROCIN 20 MG/G
OINTMENT TOPICAL 2 TIMES DAILY
OUTPATIENT
Start: 2025-01-01 | End: 2025-01-01

## 2025-01-01 RX ORDER — PROPOFOL 10 MG/ML
INJECTION, EMULSION INTRAVENOUS
Status: DISCONTINUED
Start: 2025-01-01 | End: 2025-01-01 | Stop reason: HOSPADM

## 2025-01-01 RX ORDER — ROCURONIUM BROMIDE 10 MG/ML
INJECTION, SOLUTION INTRAVENOUS
Status: DISCONTINUED
Start: 2025-01-01 | End: 2025-01-01 | Stop reason: WASHOUT

## 2025-01-01 RX ORDER — HEPARIN SODIUM 5000 [USP'U]/ML
6000 INJECTION, SOLUTION INTRAVENOUS; SUBCUTANEOUS EVERY 12 HOURS
Status: DISCONTINUED | OUTPATIENT
Start: 2025-01-01 | End: 2025-01-01

## 2025-01-01 RX ORDER — CYANOCOBALAMIN 1000 UG/ML
1000 INJECTION, SOLUTION INTRAMUSCULAR; SUBCUTANEOUS DAILY
Status: COMPLETED | OUTPATIENT
Start: 2025-01-01 | End: 2025-01-01

## 2025-01-01 RX ORDER — NALOXONE HCL 0.4 MG/ML
0.02 VIAL (ML) INJECTION
Status: DISCONTINUED | OUTPATIENT
Start: 2025-01-01 | End: 2025-01-01 | Stop reason: HOSPADM

## 2025-01-01 RX ORDER — FENTANYL CITRATE 50 UG/ML
INJECTION, SOLUTION INTRAMUSCULAR; INTRAVENOUS
Status: COMPLETED | OUTPATIENT
Start: 2025-01-01 | End: 2025-01-01

## 2025-01-01 RX ORDER — HYDROCODONE BITARTRATE AND ACETAMINOPHEN 500; 5 MG/1; MG/1
TABLET ORAL
Status: DISCONTINUED | OUTPATIENT
Start: 2025-01-01 | End: 2025-01-01

## 2025-01-01 RX ORDER — POTASSIUM CHLORIDE 7.45 MG/ML
10 INJECTION INTRAVENOUS
Status: COMPLETED | OUTPATIENT
Start: 2025-01-01 | End: 2025-01-01

## 2025-01-01 RX ORDER — NOREPINEPHRINE BITARTRATE/D5W 4MG/250ML
PLASTIC BAG, INJECTION (ML) INTRAVENOUS
Status: DISPENSED
Start: 2025-01-01 | End: 2025-01-01

## 2025-01-01 RX ORDER — PROPOFOL 10 MG/ML
0-50 INJECTION, EMULSION INTRAVENOUS CONTINUOUS
Status: DISCONTINUED | OUTPATIENT
Start: 2025-01-01 | End: 2025-01-01

## 2025-01-01 RX ORDER — MORPHINE SULFATE 2 MG/ML
2 INJECTION, SOLUTION INTRAMUSCULAR; INTRAVENOUS EVERY 30 MIN PRN
Status: DISCONTINUED | OUTPATIENT
Start: 2025-01-01 | End: 2025-01-01 | Stop reason: HOSPADM

## 2025-01-01 RX ORDER — ASPIRIN 325 MG
325 TABLET, DELAYED RELEASE (ENTERIC COATED) ORAL
Status: COMPLETED | OUTPATIENT
Start: 2025-01-01 | End: 2025-01-01

## 2025-01-01 RX ORDER — SEVELAMER CARBONATE 800 MG/1
1600 TABLET, FILM COATED ORAL
Status: DISCONTINUED | OUTPATIENT
Start: 2025-01-01 | End: 2025-01-01

## 2025-01-01 RX ORDER — SODIUM BICARBONATE 1 MEQ/ML
SYRINGE (ML) INTRAVENOUS CODE/TRAUMA/SEDATION MEDICATION
Status: COMPLETED | OUTPATIENT
Start: 2025-01-01 | End: 2025-01-01

## 2025-01-01 RX ORDER — HYDROXYZINE PAMOATE 25 MG/1
25 CAPSULE ORAL NIGHTLY PRN
Status: DISCONTINUED | OUTPATIENT
Start: 2025-01-01 | End: 2025-01-01

## 2025-01-01 RX ORDER — SODIUM CHLORIDE 9 MG/ML
INJECTION, SOLUTION INTRAVENOUS ONCE
OUTPATIENT
Start: 2025-01-01 | End: 2025-01-01

## 2025-01-01 RX ORDER — PANTOPRAZOLE SODIUM 40 MG/10ML
40 INJECTION, POWDER, LYOPHILIZED, FOR SOLUTION INTRAVENOUS DAILY
Status: DISCONTINUED | OUTPATIENT
Start: 2025-01-01 | End: 2025-01-01

## 2025-01-01 RX ORDER — CALCIUM GLUCONATE 20 MG/ML
1 INJECTION, SOLUTION INTRAVENOUS ONCE
Status: COMPLETED | OUTPATIENT
Start: 2025-01-01 | End: 2025-01-01

## 2025-01-01 RX ORDER — NOREPINEPHRINE BITARTRATE/D5W 4MG/250ML
PLASTIC BAG, INJECTION (ML) INTRAVENOUS
Status: COMPLETED
Start: 2025-01-01 | End: 2025-01-01

## 2025-01-01 RX ORDER — FENTANYL CITRATE-0.9 % NACL/PF 10 MCG/ML
0-500 PLASTIC BAG, INJECTION (ML) INTRAVENOUS CONTINUOUS
Refills: 0 | Status: DISCONTINUED | OUTPATIENT
Start: 2025-01-01 | End: 2025-01-01 | Stop reason: HOSPADM

## 2025-01-01 RX ORDER — CLOPIDOGREL BISULFATE 75 MG/1
75 TABLET ORAL DAILY
Status: DISCONTINUED | OUTPATIENT
Start: 2025-01-01 | End: 2025-01-01

## 2025-01-01 RX ORDER — CARVEDILOL 12.5 MG/1
12.5 TABLET ORAL 2 TIMES DAILY
Status: DISCONTINUED | OUTPATIENT
Start: 2025-01-01 | End: 2025-01-01

## 2025-01-01 RX ORDER — LEVOTHYROXINE SODIUM 75 UG/1
75 TABLET ORAL
Status: DISCONTINUED | OUTPATIENT
Start: 2025-01-01 | End: 2025-01-01

## 2025-01-01 RX ORDER — METOPROLOL SUCCINATE 50 MG/1
50 TABLET, EXTENDED RELEASE ORAL 2 TIMES DAILY
Status: DISCONTINUED | OUTPATIENT
Start: 2025-01-01 | End: 2025-01-01

## 2025-01-01 RX ORDER — NOREPINEPHRINE BITARTRATE/D5W 4MG/250ML
PLASTIC BAG, INJECTION (ML) INTRAVENOUS
Status: DISCONTINUED
Start: 2025-01-01 | End: 2025-01-01 | Stop reason: HOSPADM

## 2025-01-01 RX ORDER — MIDAZOLAM HYDROCHLORIDE 1 MG/ML
INJECTION, SOLUTION INTRAMUSCULAR; INTRAVENOUS
Status: COMPLETED | OUTPATIENT
Start: 2025-01-01 | End: 2025-01-01

## 2025-01-01 RX ORDER — ATROPINE SULFATE 0.1 MG/ML
INJECTION INTRAVENOUS CODE/TRAUMA/SEDATION MEDICATION
Status: COMPLETED | OUTPATIENT
Start: 2025-01-01 | End: 2025-01-01

## 2025-01-01 RX ORDER — POTASSIUM CHLORIDE 7.45 MG/ML
10 INJECTION INTRAVENOUS
Status: DISPENSED | OUTPATIENT
Start: 2025-01-01 | End: 2025-01-01

## 2025-01-01 RX ORDER — LEVOTHYROXINE SODIUM 75 UG/1
75 TABLET ORAL
Status: DISCONTINUED | OUTPATIENT
Start: 2025-01-01 | End: 2025-01-01 | Stop reason: HOSPADM

## 2025-01-01 RX ORDER — NAPROXEN SODIUM 220 MG/1
81 TABLET, FILM COATED ORAL DAILY
Status: DISCONTINUED | OUTPATIENT
Start: 2025-01-01 | End: 2025-01-01

## 2025-01-01 RX ORDER — FOLIC ACID 1 MG/1
1 TABLET ORAL DAILY
Status: DISCONTINUED | OUTPATIENT
Start: 2025-01-01 | End: 2025-01-01 | Stop reason: HOSPADM

## 2025-01-01 RX ORDER — EPINEPHRINE 0.1 MG/ML
INJECTION INTRAVENOUS CODE/TRAUMA/SEDATION MEDICATION
Status: COMPLETED | OUTPATIENT
Start: 2025-01-01 | End: 2025-01-01

## 2025-01-01 RX ORDER — PROPOFOL 10 MG/ML
INJECTION, EMULSION INTRAVENOUS
Status: COMPLETED
Start: 2025-01-01 | End: 2025-01-01

## 2025-01-01 RX ORDER — OXYCODONE HYDROCHLORIDE 5 MG/1
5 TABLET ORAL EVERY 6 HOURS PRN
Refills: 0 | Status: DISCONTINUED | OUTPATIENT
Start: 2025-01-01 | End: 2025-01-01 | Stop reason: HOSPADM

## 2025-01-01 RX ORDER — GLYCERIN 1 G/1
1 SUPPOSITORY RECTAL ONCE
Status: DISCONTINUED | OUTPATIENT
Start: 2025-01-01 | End: 2025-01-01

## 2025-01-01 RX ORDER — IBUPROFEN 200 MG
24 TABLET ORAL
Status: DISCONTINUED | OUTPATIENT
Start: 2025-01-01 | End: 2025-01-01 | Stop reason: HOSPADM

## 2025-01-01 RX ORDER — ALBUTEROL SULFATE 2.5 MG/.5ML
2.5 SOLUTION RESPIRATORY (INHALATION) EVERY 4 HOURS PRN
Status: DISCONTINUED | OUTPATIENT
Start: 2025-01-01 | End: 2025-01-01 | Stop reason: HOSPADM

## 2025-01-01 RX ORDER — FENTANYL CITRATE 50 UG/ML
INJECTION, SOLUTION INTRAMUSCULAR; INTRAVENOUS
Status: DISCONTINUED
Start: 2025-01-01 | End: 2025-01-01 | Stop reason: HOSPADM

## 2025-01-01 RX ORDER — HEPARIN SODIUM 5000 [USP'U]/ML
5000 INJECTION, SOLUTION INTRAVENOUS; SUBCUTANEOUS EVERY 8 HOURS
Status: DISCONTINUED | OUTPATIENT
Start: 2025-01-01 | End: 2025-01-01

## 2025-01-01 RX ORDER — AMIODARONE HYDROCHLORIDE 150 MG/3ML
INJECTION, SOLUTION INTRAVENOUS CODE/TRAUMA/SEDATION MEDICATION
Status: COMPLETED | OUTPATIENT
Start: 2025-01-01 | End: 2025-01-01

## 2025-01-01 RX ORDER — ROCURONIUM BROMIDE 10 MG/ML
INJECTION, SOLUTION INTRAVENOUS
Status: DISPENSED
Start: 2025-01-01 | End: 2025-01-01

## 2025-01-01 RX ORDER — SUCCINYLCHOLINE CHLORIDE 20 MG/ML
INJECTION INTRAMUSCULAR; INTRAVENOUS
Status: DISCONTINUED
Start: 2025-01-01 | End: 2025-01-01 | Stop reason: WASHOUT

## 2025-01-01 RX ORDER — MUPIROCIN 20 MG/G
OINTMENT TOPICAL 2 TIMES DAILY
Status: COMPLETED | OUTPATIENT
Start: 2025-01-01 | End: 2025-01-01

## 2025-01-01 RX ORDER — SODIUM CHLORIDE 0.9 % (FLUSH) 0.9 %
10 SYRINGE (ML) INJECTION EVERY 12 HOURS PRN
Status: DISCONTINUED | OUTPATIENT
Start: 2025-01-01 | End: 2025-01-01 | Stop reason: HOSPADM

## 2025-01-01 RX ORDER — FENTANYL CITRATE 50 UG/ML
50 INJECTION, SOLUTION INTRAMUSCULAR; INTRAVENOUS ONCE
Refills: 0 | Status: COMPLETED | OUTPATIENT
Start: 2025-01-01 | End: 2025-01-01

## 2025-01-01 RX ORDER — METOPROLOL TARTRATE 1 MG/ML
5 INJECTION, SOLUTION INTRAVENOUS EVERY 5 MIN PRN
Status: COMPLETED | OUTPATIENT
Start: 2025-01-01 | End: 2025-01-01

## 2025-01-01 RX ORDER — ATORVASTATIN CALCIUM 40 MG/1
40 TABLET, FILM COATED ORAL NIGHTLY
Status: DISCONTINUED | OUTPATIENT
Start: 2025-01-01 | End: 2025-01-01 | Stop reason: HOSPADM

## 2025-01-01 RX ORDER — FENTANYL CITRATE 50 UG/ML
100 INJECTION, SOLUTION INTRAMUSCULAR; INTRAVENOUS ONCE
Refills: 0 | Status: COMPLETED | OUTPATIENT
Start: 2025-01-01 | End: 2025-01-01

## 2025-01-01 RX ORDER — POTASSIUM CHLORIDE 7.45 MG/ML
10 INJECTION INTRAVENOUS ONCE
Status: DISCONTINUED | OUTPATIENT
Start: 2025-01-01 | End: 2025-01-01

## 2025-01-01 RX ORDER — CARVEDILOL 12.5 MG/1
12.5 TABLET ORAL 2 TIMES DAILY WITH MEALS
Status: DISCONTINUED | OUTPATIENT
Start: 2025-01-01 | End: 2025-01-01

## 2025-01-01 RX ORDER — POLYETHYLENE GLYCOL 3350 17 G/17G
17 POWDER, FOR SOLUTION ORAL DAILY
Status: DISCONTINUED | OUTPATIENT
Start: 2025-01-01 | End: 2025-01-01 | Stop reason: HOSPADM

## 2025-01-01 RX ORDER — INDOMETHACIN 25 MG/1
CAPSULE ORAL
Status: DISCONTINUED
Start: 2025-01-01 | End: 2025-01-01 | Stop reason: HOSPADM

## 2025-01-01 RX ORDER — METOPROLOL TARTRATE 1 MG/ML
INJECTION, SOLUTION INTRAVENOUS
Status: COMPLETED
Start: 2025-01-01 | End: 2025-01-01

## 2025-01-01 RX ORDER — CALCIUM GLUCONATE 20 MG/ML
INJECTION, SOLUTION INTRAVENOUS
Status: COMPLETED
Start: 2025-01-01 | End: 2025-01-01

## 2025-01-01 RX ORDER — HEPARIN SODIUM 5000 [USP'U]/ML
8000 INJECTION, SOLUTION INTRAVENOUS; SUBCUTANEOUS EVERY 12 HOURS
Status: DISCONTINUED | OUTPATIENT
Start: 2025-01-01 | End: 2025-01-01

## 2025-01-01 RX ORDER — POTASSIUM CHLORIDE 14.9 MG/ML
20 INJECTION INTRAVENOUS ONCE
Status: COMPLETED | OUTPATIENT
Start: 2025-01-01 | End: 2025-01-01

## 2025-01-01 RX ORDER — LOSARTAN POTASSIUM 25 MG/1
25 TABLET ORAL DAILY
Status: DISCONTINUED | OUTPATIENT
Start: 2025-01-01 | End: 2025-01-01

## 2025-01-01 RX ORDER — PROPOFOL 10 MG/ML
0-50 INJECTION, EMULSION INTRAVENOUS CONTINUOUS
Status: DISCONTINUED | OUTPATIENT
Start: 2025-01-01 | End: 2025-01-01 | Stop reason: HOSPADM

## 2025-01-01 RX ORDER — HEPARIN SODIUM 200 [USP'U]/100ML
INJECTION, SOLUTION INTRAVENOUS
Status: COMPLETED | OUTPATIENT
Start: 2025-01-01 | End: 2025-01-01

## 2025-01-01 RX ORDER — TALC
6 POWDER (GRAM) TOPICAL NIGHTLY PRN
Status: DISCONTINUED | OUTPATIENT
Start: 2025-01-01 | End: 2025-01-01 | Stop reason: HOSPADM

## 2025-01-01 RX ORDER — HEPARIN SODIUM 5000 [USP'U]/ML
8000 INJECTION, SOLUTION INTRAVENOUS; SUBCUTANEOUS EVERY 8 HOURS
Status: DISCONTINUED | OUTPATIENT
Start: 2025-01-01 | End: 2025-01-01

## 2025-01-01 RX ORDER — HEPARIN SODIUM 5000 [USP'U]/ML
6000 INJECTION, SOLUTION INTRAVENOUS; SUBCUTANEOUS EVERY 8 HOURS
Status: DISCONTINUED | OUTPATIENT
Start: 2025-01-01 | End: 2025-01-01

## 2025-01-01 RX ORDER — HYDRALAZINE HYDROCHLORIDE 25 MG/1
25 TABLET, FILM COATED ORAL EVERY 12 HOURS
Status: DISCONTINUED | OUTPATIENT
Start: 2025-01-01 | End: 2025-01-01

## 2025-01-01 RX ORDER — POTASSIUM CHLORIDE 20 MEQ/1
40 TABLET, EXTENDED RELEASE ORAL ONCE
Status: COMPLETED | OUTPATIENT
Start: 2025-01-01 | End: 2025-01-01

## 2025-01-01 RX ORDER — FENTANYL CITRATE 50 UG/ML
INJECTION, SOLUTION INTRAMUSCULAR; INTRAVENOUS
Status: COMPLETED
Start: 2025-01-01 | End: 2025-01-01

## 2025-01-01 RX ORDER — GLUCAGON 1 MG
1 KIT INJECTION
Status: DISCONTINUED | OUTPATIENT
Start: 2025-01-01 | End: 2025-01-01 | Stop reason: HOSPADM

## 2025-01-01 RX ORDER — CALCIUM CHLORIDE INJECTION 100 MG/ML
INJECTION, SOLUTION INTRAVENOUS CODE/TRAUMA/SEDATION MEDICATION
Status: COMPLETED | OUTPATIENT
Start: 2025-01-01 | End: 2025-01-01

## 2025-01-01 RX ORDER — MAGNESIUM SULFATE HEPTAHYDRATE 40 MG/ML
2 INJECTION, SOLUTION INTRAVENOUS
Status: DISCONTINUED | OUTPATIENT
Start: 2025-01-01 | End: 2025-01-01 | Stop reason: HOSPADM

## 2025-01-01 RX ORDER — AMIODARONE HYDROCHLORIDE 200 MG/1
400 TABLET ORAL 2 TIMES DAILY
Status: DISCONTINUED | OUTPATIENT
Start: 2025-01-01 | End: 2025-01-01 | Stop reason: HOSPADM

## 2025-01-01 RX ORDER — HYDROMORPHONE HYDROCHLORIDE 1 MG/ML
0.5 INJECTION, SOLUTION INTRAMUSCULAR; INTRAVENOUS; SUBCUTANEOUS EVERY 4 HOURS PRN
Status: DISCONTINUED | OUTPATIENT
Start: 2025-01-01 | End: 2025-01-01 | Stop reason: HOSPADM

## 2025-01-01 RX ORDER — HEPARIN SODIUM 1000 [USP'U]/ML
INJECTION, SOLUTION INTRAVENOUS; SUBCUTANEOUS
Status: COMPLETED | OUTPATIENT
Start: 2025-01-01 | End: 2025-01-01

## 2025-01-01 RX ORDER — HEPARIN SODIUM 1000 [USP'U]/ML
2400 INJECTION, SOLUTION INTRAVENOUS; SUBCUTANEOUS
Status: DISCONTINUED | OUTPATIENT
Start: 2025-01-01 | End: 2025-01-01 | Stop reason: HOSPADM

## 2025-01-01 RX ORDER — NOREPINEPHRINE BITARTRATE 1 MG/ML
INJECTION, SOLUTION INTRAVENOUS
Status: DISCONTINUED
Start: 2025-01-01 | End: 2025-01-01 | Stop reason: HOSPADM

## 2025-01-01 RX ORDER — HYDRALAZINE HYDROCHLORIDE 25 MG/1
50 TABLET, FILM COATED ORAL EVERY 12 HOURS
Status: DISCONTINUED | OUTPATIENT
Start: 2025-01-01 | End: 2025-01-01

## 2025-01-01 RX ORDER — LIDOCAINE 50 MG/G
1 PATCH TOPICAL
Status: DISCONTINUED | OUTPATIENT
Start: 2025-01-01 | End: 2025-01-01 | Stop reason: HOSPADM

## 2025-01-01 RX ORDER — CALCIUM GLUCONATE 20 MG/ML
INJECTION, SOLUTION INTRAVENOUS
Status: DISCONTINUED
Start: 2025-01-01 | End: 2025-01-01 | Stop reason: HOSPADM

## 2025-01-01 RX ADMIN — PANTOPRAZOLE SODIUM 40 MG: 40 INJECTION, POWDER, FOR SOLUTION INTRAVENOUS at 08:06

## 2025-01-01 RX ADMIN — LIDOCAINE 1 PATCH: 50 PATCH CUTANEOUS at 06:06

## 2025-01-01 RX ADMIN — NOREPINEPHRINE BITARTRATE 0.1 MCG/KG/MIN: 4 INJECTION, SOLUTION INTRAVENOUS at 07:06

## 2025-01-01 RX ADMIN — HEPARIN SODIUM 5000 UNITS: 5000 INJECTION INTRAVENOUS; SUBCUTANEOUS at 09:06

## 2025-01-01 RX ADMIN — ATORVASTATIN CALCIUM 40 MG: 40 TABLET, FILM COATED ORAL at 09:06

## 2025-01-01 RX ADMIN — AMIODARONE HYDROCHLORIDE 400 MG: 200 TABLET ORAL at 09:06

## 2025-01-01 RX ADMIN — OXYCODONE 5 MG: 5 TABLET ORAL at 02:06

## 2025-01-01 RX ADMIN — CLOPIDOGREL BISULFATE 75 MG: 75 TABLET, FILM COATED ORAL at 09:06

## 2025-01-01 RX ADMIN — SODIUM ZIRCONIUM CYCLOSILICATE 10 G: 5 POWDER, FOR SUSPENSION ORAL at 12:06

## 2025-01-01 RX ADMIN — LIDOCAINE 1 PATCH: 50 PATCH CUTANEOUS at 05:06

## 2025-01-01 RX ADMIN — PROPOFOL 40 MCG/KG/MIN: 10 INJECTION, EMULSION INTRAVENOUS at 01:06

## 2025-01-01 RX ADMIN — ALBUTEROL SULFATE 10 MG: 2.5 SOLUTION RESPIRATORY (INHALATION) at 07:06

## 2025-01-01 RX ADMIN — SEVELAMER CARBONATE 1600 MG: 800 TABLET, FILM COATED ORAL at 04:06

## 2025-01-01 RX ADMIN — METOPROLOL SUCCINATE 100 MG: 50 TABLET, EXTENDED RELEASE ORAL at 08:06

## 2025-01-01 RX ADMIN — LEVOTHYROXINE SODIUM 75 MCG: 75 TABLET ORAL at 05:06

## 2025-01-01 RX ADMIN — SODIUM CHLORIDE 250 ML: 9 INJECTION, SOLUTION INTRAVENOUS at 05:06

## 2025-01-01 RX ADMIN — CARVEDILOL 12.5 MG: 12.5 TABLET, FILM COATED ORAL at 08:06

## 2025-01-01 RX ADMIN — IOHEXOL 75 ML: 300 INJECTION, SOLUTION INTRAVENOUS at 12:06

## 2025-01-01 RX ADMIN — AMIODARONE HYDROCHLORIDE 400 MG: 200 TABLET ORAL at 08:06

## 2025-01-01 RX ADMIN — ATORVASTATIN CALCIUM 40 MG: 40 TABLET, FILM COATED ORAL at 08:06

## 2025-01-01 RX ADMIN — CLOPIDOGREL BISULFATE 75 MG: 75 TABLET, FILM COATED ORAL at 08:06

## 2025-01-01 RX ADMIN — AMIODARONE HYDROCHLORIDE 1 MG/MIN: 1.8 INJECTION, SOLUTION INTRAVENOUS at 10:06

## 2025-01-01 RX ADMIN — OXYCODONE 5 MG: 5 TABLET ORAL at 10:06

## 2025-01-01 RX ADMIN — CYANOCOBALAMIN 1000 MCG: 1000 INJECTION INTRAMUSCULAR; SUBCUTANEOUS at 08:06

## 2025-01-01 RX ADMIN — POTASSIUM CHLORIDE 10 MEQ: 7.46 INJECTION, SOLUTION INTRAVENOUS at 08:06

## 2025-01-01 RX ADMIN — SEVELAMER CARBONATE 1600 MG: 800 TABLET, FILM COATED ORAL at 05:06

## 2025-01-01 RX ADMIN — CARVEDILOL 12.5 MG: 12.5 TABLET, FILM COATED ORAL at 05:06

## 2025-01-01 RX ADMIN — Medication 6 MG: at 10:06

## 2025-01-01 RX ADMIN — HEPARIN SODIUM 12 UNITS/KG/HR: 10000 INJECTION, SOLUTION INTRAVENOUS at 05:06

## 2025-01-01 RX ADMIN — CALCIUM CHLORIDE 2 G: 100 INJECTION, SOLUTION INTRAVENOUS at 03:06

## 2025-01-01 RX ADMIN — LEVOTHYROXINE SODIUM 75 MCG: 75 TABLET ORAL at 06:06

## 2025-01-01 RX ADMIN — PROPOFOL 10 MCG/KG/MIN: 10 INJECTION, EMULSION INTRAVENOUS at 07:06

## 2025-01-01 RX ADMIN — POLYETHYLENE GLYCOL 3350 17 G: 17 POWDER, FOR SOLUTION ORAL at 09:06

## 2025-01-01 RX ADMIN — HYDROMORPHONE HYDROCHLORIDE 0.5 MG: 1 INJECTION, SOLUTION INTRAMUSCULAR; INTRAVENOUS; SUBCUTANEOUS at 02:06

## 2025-01-01 RX ADMIN — FENTANYL CITRATE 25 MCG: 50 INJECTION INTRAMUSCULAR; INTRAVENOUS at 02:06

## 2025-01-01 RX ADMIN — HEPARIN SODIUM 1000 UNITS/HR: 200 INJECTION, SOLUTION INTRAVENOUS at 11:06

## 2025-01-01 RX ADMIN — POTASSIUM CHLORIDE 10 MEQ: 7.46 INJECTION, SOLUTION INTRAVENOUS at 11:06

## 2025-01-01 RX ADMIN — METOROPROLOL TARTRATE 5 MG: 5 INJECTION, SOLUTION INTRAVENOUS at 07:06

## 2025-01-01 RX ADMIN — OXYCODONE 5 MG: 5 TABLET ORAL at 01:06

## 2025-01-01 RX ADMIN — AMIODARONE HYDROCHLORIDE 300 MG: 50 INJECTION, SOLUTION INTRAVENOUS at 07:06

## 2025-01-01 RX ADMIN — EPINEPHRINE 1 MG: 0.1 INJECTION INTRACARDIAC; INTRAVENOUS at 07:06

## 2025-01-01 RX ADMIN — HEPARIN SODIUM 5000 UNITS: 5000 INJECTION INTRAVENOUS; SUBCUTANEOUS at 03:06

## 2025-01-01 RX ADMIN — HEPARIN SODIUM 12 UNITS/KG/HR: 10000 INJECTION, SOLUTION INTRAVENOUS at 01:06

## 2025-01-01 RX ADMIN — HYDROXYZINE PAMOATE 25 MG: 25 CAPSULE ORAL at 08:06

## 2025-01-01 RX ADMIN — MIDAZOLAM 1 MG: 1 INJECTION INTRAMUSCULAR; INTRAVENOUS at 11:06

## 2025-01-01 RX ADMIN — PROPOFOL 5 MCG/KG/MIN: 10 INJECTION, EMULSION INTRAVENOUS at 07:06

## 2025-01-01 RX ADMIN — SEVELAMER CARBONATE 1600 MG: 800 TABLET, FILM COATED ORAL at 12:06

## 2025-01-01 RX ADMIN — FOLIC ACID 1 MG: 1 TABLET ORAL at 08:06

## 2025-01-01 RX ADMIN — POTASSIUM BICARBONATE 20 MEQ: 391 TABLET, EFFERVESCENT ORAL at 08:06

## 2025-01-01 RX ADMIN — NOREPINEPHRINE BITARTRATE 4 MG: 4 INJECTION, SOLUTION INTRAVENOUS at 03:06

## 2025-01-01 RX ADMIN — POTASSIUM BICARBONATE 50 MEQ: 977.5 TABLET, EFFERVESCENT ORAL at 04:06

## 2025-01-01 RX ADMIN — HEPARIN SODIUM 1000 UNITS/HR: 200 INJECTION, SOLUTION INTRAVENOUS at 02:06

## 2025-01-01 RX ADMIN — SEVELAMER CARBONATE 1600 MG: 800 TABLET, FILM COATED ORAL at 09:06

## 2025-01-01 RX ADMIN — HEPARIN SODIUM 6000 UNITS: 5000 INJECTION INTRAVENOUS; SUBCUTANEOUS at 06:06

## 2025-01-01 RX ADMIN — METOPROLOL SUCCINATE 50 MG: 50 TABLET, EXTENDED RELEASE ORAL at 08:06

## 2025-01-01 RX ADMIN — POTASSIUM CHLORIDE 10 MEQ: 7.46 INJECTION, SOLUTION INTRAVENOUS at 06:06

## 2025-01-01 RX ADMIN — CARVEDILOL 12.5 MG: 12.5 TABLET, FILM COATED ORAL at 09:06

## 2025-01-01 RX ADMIN — ATROPINE SULFATE 1 MG: 0.1 INJECTION PARENTERAL at 06:06

## 2025-01-01 RX ADMIN — SODIUM CHLORIDE 250 ML: 0.9 INJECTION, SOLUTION INTRAVENOUS at 02:06

## 2025-01-01 RX ADMIN — CYANOCOBALAMIN 1000 MCG: 1000 INJECTION INTRAMUSCULAR; SUBCUTANEOUS at 01:06

## 2025-01-01 RX ADMIN — MUPIROCIN: 20 OINTMENT TOPICAL at 09:06

## 2025-01-01 RX ADMIN — FOLIC ACID 1 MG: 1 TABLET ORAL at 09:06

## 2025-01-01 RX ADMIN — PANTOPRAZOLE SODIUM 40 MG: 40 INJECTION, POWDER, FOR SOLUTION INTRAVENOUS at 07:06

## 2025-01-01 RX ADMIN — SEVELAMER CARBONATE 1600 MG: 800 TABLET, FILM COATED ORAL at 08:06

## 2025-01-01 RX ADMIN — AMIODARONE HYDROCHLORIDE 200 MG: 200 TABLET ORAL at 08:06

## 2025-01-01 RX ADMIN — Medication 1 MG: at 03:06

## 2025-01-01 RX ADMIN — HEPARIN SODIUM 12 UNITS/KG/HR: 10000 INJECTION, SOLUTION INTRAVENOUS at 10:06

## 2025-01-01 RX ADMIN — AMIODARONE HYDROCHLORIDE 0.5 MG/MIN: 1.8 INJECTION, SOLUTION INTRAVENOUS at 03:06

## 2025-01-01 RX ADMIN — AMIODARONE HYDROCHLORIDE 0.5 MG/MIN: 1.8 INJECTION, SOLUTION INTRAVENOUS at 10:06

## 2025-01-01 RX ADMIN — HYDRALAZINE HYDROCHLORIDE 25 MG: 25 TABLET ORAL at 08:06

## 2025-01-01 RX ADMIN — POTASSIUM CHLORIDE 40 MEQ: 29.8 INJECTION, SOLUTION INTRAVENOUS at 04:06

## 2025-01-01 RX ADMIN — ASPIRIN 81 MG CHEWABLE TABLET 81 MG: 81 TABLET CHEWABLE at 10:06

## 2025-01-01 RX ADMIN — NOREPINEPHRINE BITARTRATE 3 MCG/KG/MIN: 1 INJECTION, SOLUTION, CONCENTRATE INTRAVENOUS at 08:06

## 2025-01-01 RX ADMIN — POTASSIUM CHLORIDE 10 MEQ: 7.46 INJECTION, SOLUTION INTRAVENOUS at 10:06

## 2025-01-01 RX ADMIN — SODIUM BICARBONATE: 84 INJECTION, SOLUTION INTRAVENOUS at 07:06

## 2025-01-01 RX ADMIN — HEPARIN SODIUM 6000 UNITS: 5000 INJECTION INTRAVENOUS; SUBCUTANEOUS at 09:06

## 2025-01-01 RX ADMIN — Medication 50 MCG/HR: at 09:06

## 2025-01-01 RX ADMIN — POTASSIUM CHLORIDE 20 MEQ: 14.9 INJECTION, SOLUTION INTRAVENOUS at 09:06

## 2025-01-01 RX ADMIN — HYDRALAZINE HYDROCHLORIDE 25 MG: 25 TABLET ORAL at 09:06

## 2025-01-01 RX ADMIN — HEPARIN SODIUM 5000 UNITS: 5000 INJECTION INTRAVENOUS; SUBCUTANEOUS at 06:06

## 2025-01-01 RX ADMIN — ASPIRIN 81 MG CHEWABLE TABLET 81 MG: 81 TABLET CHEWABLE at 09:06

## 2025-01-01 RX ADMIN — HYDROXYZINE PAMOATE 25 MG: 25 CAPSULE ORAL at 09:06

## 2025-01-01 RX ADMIN — METOROPROLOL TARTRATE 5 MG: 5 INJECTION, SOLUTION INTRAVENOUS at 08:06

## 2025-01-01 RX ADMIN — MUPIROCIN: 20 OINTMENT TOPICAL at 08:06

## 2025-01-01 RX ADMIN — SODIUM CHLORIDE 0.04 UNITS/MIN: 9 INJECTION, SOLUTION INTRAVENOUS at 10:06

## 2025-01-01 RX ADMIN — HYDRALAZINE HYDROCHLORIDE 50 MG: 25 TABLET ORAL at 08:06

## 2025-01-01 RX ADMIN — METOPROLOL SUCCINATE 100 MG: 50 TABLET, EXTENDED RELEASE ORAL at 09:06

## 2025-01-01 RX ADMIN — AMIODARONE HYDROCHLORIDE 0.5 MG/MIN: 1.8 INJECTION, SOLUTION INTRAVENOUS at 11:06

## 2025-01-01 RX ADMIN — NOREPINEPHRINE BITARTRATE 0.08 MCG/KG/MIN: 4 INJECTION, SOLUTION INTRAVENOUS at 04:06

## 2025-01-01 RX ADMIN — CLOPIDOGREL 75 MG: 75 TABLET ORAL at 03:06

## 2025-01-01 RX ADMIN — PANTOPRAZOLE SODIUM 40 MG: 40 INJECTION, POWDER, FOR SOLUTION INTRAVENOUS at 10:06

## 2025-01-01 RX ADMIN — POTASSIUM BICARBONATE 50 MEQ: 977.5 TABLET, EFFERVESCENT ORAL at 08:06

## 2025-01-01 RX ADMIN — LOSARTAN POTASSIUM 25 MG: 25 TABLET, FILM COATED ORAL at 08:06

## 2025-01-01 RX ADMIN — ASPIRIN 325 MG: 325 TABLET, COATED ORAL at 09:06

## 2025-01-01 RX ADMIN — SEVELAMER CARBONATE 1600 MG: 800 TABLET, FILM COATED ORAL at 11:06

## 2025-01-01 RX ADMIN — AMIODARONE HYDROCHLORIDE 200 MG: 200 TABLET ORAL at 12:06

## 2025-01-01 RX ADMIN — HYDROXYZINE PAMOATE 25 MG: 25 CAPSULE ORAL at 05:06

## 2025-01-01 RX ADMIN — DEXTROSE MONOHYDRATE 25 G: 25 INJECTION, SOLUTION INTRAVENOUS at 07:06

## 2025-01-01 RX ADMIN — PROPOFOL 15 MCG/KG/MIN: 10 INJECTION, EMULSION INTRAVENOUS at 09:06

## 2025-01-01 RX ADMIN — HEPARIN SODIUM 5000 UNITS: 5000 INJECTION INTRAVENOUS; SUBCUTANEOUS at 10:06

## 2025-01-01 RX ADMIN — POTASSIUM CHLORIDE 40 MEQ: 1500 TABLET, EXTENDED RELEASE ORAL at 09:06

## 2025-01-01 RX ADMIN — ASPIRIN 81 MG CHEWABLE TABLET 81 MG: 81 TABLET CHEWABLE at 08:06

## 2025-01-01 RX ADMIN — AMIODARONE HYDROCHLORIDE 0.5 MG/MIN: 1.8 INJECTION, SOLUTION INTRAVENOUS at 08:06

## 2025-01-01 RX ADMIN — CALCIUM GLUCONATE 1 G: 20 INJECTION, SOLUTION INTRAVENOUS at 08:06

## 2025-01-01 RX ADMIN — SEVELAMER CARBONATE 1600 MG: 800 TABLET, FILM COATED ORAL at 01:06

## 2025-01-01 RX ADMIN — HEPARIN SODIUM 18 UNITS/KG/HR: 10000 INJECTION, SOLUTION INTRAVENOUS at 09:06

## 2025-01-01 RX ADMIN — NOREPINEPHRINE BITARTRATE 0.7 MCG/KG/MIN: 1 INJECTION, SOLUTION, CONCENTRATE INTRAVENOUS at 01:06

## 2025-01-01 RX ADMIN — AMIODARONE HYDROCHLORIDE 0.5 MG/MIN: 1.8 INJECTION, SOLUTION INTRAVENOUS at 02:06

## 2025-01-01 RX ADMIN — CYANOCOBALAMIN 1000 MCG: 1000 INJECTION INTRAMUSCULAR; SUBCUTANEOUS at 09:06

## 2025-01-01 RX ADMIN — FENTANYL CITRATE 50 MCG: 50 INJECTION INTRAMUSCULAR; INTRAVENOUS at 08:06

## 2025-01-01 RX ADMIN — HYDROMORPHONE HYDROCHLORIDE 0.5 MG: 1 INJECTION, SOLUTION INTRAMUSCULAR; INTRAVENOUS; SUBCUTANEOUS at 11:06

## 2025-01-01 RX ADMIN — NOREPINEPHRINE BITARTRATE 0.02 MCG/KG/MIN: 1 INJECTION, SOLUTION, CONCENTRATE INTRAVENOUS at 06:06

## 2025-01-01 RX ADMIN — LIDOCAINE 1 PATCH: 50 PATCH CUTANEOUS at 08:06

## 2025-01-01 RX ADMIN — AMIODARONE HYDROCHLORIDE 0.5 MG/MIN: 1.8 INJECTION, SOLUTION INTRAVENOUS at 04:06

## 2025-01-01 RX ADMIN — HYDROMORPHONE HYDROCHLORIDE 0.2 MG: 1 INJECTION, SOLUTION INTRAMUSCULAR; INTRAVENOUS; SUBCUTANEOUS at 12:06

## 2025-01-01 RX ADMIN — PROPOFOL 20 MCG/KG/MIN: 10 INJECTION, EMULSION INTRAVENOUS at 03:06

## 2025-01-01 RX ADMIN — SEVELAMER CARBONATE 1.6 G: 800 POWDER, FOR SUSPENSION ORAL at 08:06

## 2025-01-01 RX ADMIN — AMIODARONE HYDROCHLORIDE 0.5 MG/MIN: 1.8 INJECTION, SOLUTION INTRAVENOUS at 01:06

## 2025-01-01 RX ADMIN — LOSARTAN POTASSIUM 25 MG: 25 TABLET, FILM COATED ORAL at 01:06

## 2025-01-01 RX ADMIN — AMIODARONE HYDROCHLORIDE 1 MG/MIN: 1.8 INJECTION, SOLUTION INTRAVENOUS at 09:06

## 2025-01-01 RX ADMIN — AMIODARONE HYDROCHLORIDE 400 MG: 200 TABLET ORAL at 07:06

## 2025-01-01 RX ADMIN — OXYCODONE 5 MG: 5 TABLET ORAL at 09:06

## 2025-01-01 RX ADMIN — IOHEXOL 75 ML: 300 INJECTION, SOLUTION INTRAVENOUS at 02:06

## 2025-01-01 RX ADMIN — LIDOCAINE HYDROCHLORIDE 5 ML: 10 INJECTION, SOLUTION INFILTRATION; PERINEURAL at 11:06

## 2025-01-01 RX ADMIN — HEPARIN SODIUM 10 UNITS/KG/HR: 10000 INJECTION, SOLUTION INTRAVENOUS at 08:06

## 2025-01-01 RX ADMIN — POTASSIUM BICARBONATE 50 MEQ: 977.5 TABLET, EFFERVESCENT ORAL at 01:06

## 2025-01-01 RX ADMIN — AMIODARONE HYDROCHLORIDE 150 MG: 1.5 INJECTION, SOLUTION INTRAVENOUS at 09:06

## 2025-01-01 RX ADMIN — HYDROMORPHONE HYDROCHLORIDE 0.5 MG: 1 INJECTION, SOLUTION INTRAMUSCULAR; INTRAVENOUS; SUBCUTANEOUS at 10:06

## 2025-01-01 RX ADMIN — AMIODARONE HYDROCHLORIDE 1 MG/MIN: 1.8 INJECTION, SOLUTION INTRAVENOUS at 12:06

## 2025-01-01 RX ADMIN — AMIODARONE HYDROCHLORIDE 150 MG: 1.5 INJECTION, SOLUTION INTRAVENOUS at 08:06

## 2025-01-01 RX ADMIN — DEXTROSE MONOHYDRATE 25 G: 25 INJECTION, SOLUTION INTRAVENOUS at 03:06

## 2025-01-01 RX ADMIN — HYDROMORPHONE HYDROCHLORIDE 0.5 MG: 1 INJECTION, SOLUTION INTRAMUSCULAR; INTRAVENOUS; SUBCUTANEOUS at 05:06

## 2025-01-01 RX ADMIN — INSULIN HUMAN 8 UNITS: 100 INJECTION, SOLUTION PARENTERAL at 07:06

## 2025-01-01 RX ADMIN — HYDRALAZINE HYDROCHLORIDE 50 MG: 25 TABLET ORAL at 09:06

## 2025-01-01 RX ADMIN — EPINEPHRINE 1 MG: 0.1 INJECTION INTRACARDIAC; INTRAVENOUS at 03:06

## 2025-01-01 RX ADMIN — HEPARIN SODIUM 3000 UNITS: 1000 INJECTION, SOLUTION INTRAVENOUS; SUBCUTANEOUS at 11:06

## 2025-01-01 RX ADMIN — ASPIRIN 81 MG CHEWABLE TABLET 81 MG: 81 TABLET CHEWABLE at 07:06

## 2025-01-01 RX ADMIN — AMIODARONE HYDROCHLORIDE 1 MG/MIN: 1.8 INJECTION, SOLUTION INTRAVENOUS at 08:06

## 2025-01-01 RX ADMIN — POTASSIUM BICARBONATE 50 MEQ: 977.5 TABLET, EFFERVESCENT ORAL at 07:06

## 2025-01-01 RX ADMIN — POTASSIUM CHLORIDE 40 MEQ: 29.8 INJECTION, SOLUTION INTRAVENOUS at 11:06

## 2025-01-01 RX ADMIN — OXYCODONE 5 MG: 5 TABLET ORAL at 11:06

## 2025-01-01 RX ADMIN — CLOPIDOGREL BISULFATE 75 MG: 75 TABLET, FILM COATED ORAL at 07:06

## 2025-01-01 RX ADMIN — HEPARIN SODIUM 10 UNITS/KG/HR: 10000 INJECTION, SOLUTION INTRAVENOUS at 12:06

## 2025-01-01 RX ADMIN — HEPARIN SODIUM 5000 UNITS: 5000 INJECTION INTRAVENOUS; SUBCUTANEOUS at 05:06

## 2025-01-01 RX ADMIN — HEPARIN SODIUM 5000 UNITS: 5000 INJECTION INTRAVENOUS; SUBCUTANEOUS at 01:06

## 2025-01-01 RX ADMIN — PROPOFOL 40 MCG/KG/MIN: 10 INJECTION, EMULSION INTRAVENOUS at 04:06

## 2025-01-01 RX ADMIN — SEVELAMER CARBONATE 1600 MG: 800 TABLET, FILM COATED ORAL at 06:06

## 2025-01-01 RX ADMIN — AMIODARONE HYDROCHLORIDE 1 MG/MIN: 1.8 INJECTION, SOLUTION INTRAVENOUS at 01:06

## 2025-01-01 RX ADMIN — AMIODARONE HYDROCHLORIDE 400 MG: 200 TABLET ORAL at 11:06

## 2025-01-01 RX ADMIN — INSULIN HUMAN 7.6 UNITS: 100 INJECTION, SOLUTION PARENTERAL at 08:06

## 2025-01-01 RX ADMIN — EPINEPHRINE 1 MG: 0.1 INJECTION INTRACARDIAC; INTRAVENOUS at 06:06

## 2025-01-01 RX ADMIN — FENTANYL CITRATE 50 MCG: 50 INJECTION INTRAMUSCULAR; INTRAVENOUS at 11:06

## 2025-01-01 RX ADMIN — SODIUM BICARBONATE 100 MEQ: 84 INJECTION, SOLUTION INTRAVENOUS at 03:06

## 2025-01-01 RX ADMIN — FENTANYL CITRATE 100 MCG: 50 INJECTION, SOLUTION INTRAMUSCULAR; INTRAVENOUS at 07:06

## 2025-01-01 RX ADMIN — HEPARIN SODIUM 6000 UNITS: 5000 INJECTION INTRAVENOUS; SUBCUTANEOUS at 01:06

## 2025-01-01 RX ADMIN — POTASSIUM CHLORIDE 20 MEQ: 14.9 INJECTION, SOLUTION INTRAVENOUS at 08:06

## 2025-01-01 RX ADMIN — LIDOCAINE HYDROCHLORIDE 5 ML: 10 INJECTION, SOLUTION INFILTRATION; PERINEURAL at 02:06

## 2025-01-01 RX ADMIN — FENTANYL CITRATE 100 MCG: 50 INJECTION INTRAMUSCULAR; INTRAVENOUS at 07:06

## 2025-01-01 RX ADMIN — OXYCODONE 5 MG: 5 TABLET ORAL at 12:06

## 2025-01-01 RX ADMIN — AMIODARONE HYDROCHLORIDE 0.5 MG/MIN: 1.8 INJECTION, SOLUTION INTRAVENOUS at 07:06

## 2025-01-01 RX ADMIN — HEPARIN SODIUM 2400 UNITS: 1000 INJECTION, SOLUTION INTRAVENOUS; SUBCUTANEOUS at 06:06

## 2025-01-01 RX ADMIN — OXYCODONE 5 MG: 5 TABLET ORAL at 03:06

## 2025-01-01 RX ADMIN — DEXTROSE MONOHYDRATE 50 G: 25 INJECTION, SOLUTION INTRAVENOUS at 10:06

## 2025-01-01 RX ADMIN — PROPOFOL 40 MCG/KG/MIN: 10 INJECTION, EMULSION INTRAVENOUS at 05:06

## 2025-01-01 RX ADMIN — PROPOFOL 40 MCG/KG/MIN: 10 INJECTION, EMULSION INTRAVENOUS at 10:06

## 2025-01-01 RX ADMIN — OXYCODONE 5 MG: 5 TABLET ORAL at 06:06

## 2025-01-01 RX ADMIN — MIDAZOLAM 0.5 MG: 1 INJECTION INTRAMUSCULAR; INTRAVENOUS at 02:06

## 2025-01-22 ENCOUNTER — HOSPITAL ENCOUNTER (INPATIENT)
Facility: HOSPITAL | Age: 65
LOS: 1 days | Discharge: HOME | DRG: 321 | End: 2025-01-25
Attending: STUDENT IN AN ORGANIZED HEALTH CARE EDUCATION/TRAINING PROGRAM | Admitting: INTERNAL MEDICINE
Payer: COMMERCIAL

## 2025-01-22 DIAGNOSIS — I10 PRIMARY HYPERTENSION: ICD-10-CM

## 2025-01-22 DIAGNOSIS — Z95.5 S/P DRUG ELUTING CORONARY STENT PLACEMENT: ICD-10-CM

## 2025-01-22 DIAGNOSIS — R79.89 ELEVATED TROPONIN: ICD-10-CM

## 2025-01-22 DIAGNOSIS — R07.9 CHEST PAIN: ICD-10-CM

## 2025-01-22 DIAGNOSIS — I20.0 UNSTABLE ANGINA: Primary | ICD-10-CM

## 2025-01-22 DIAGNOSIS — I48.19 PERSISTENT ATRIAL FIBRILLATION: ICD-10-CM

## 2025-01-22 DIAGNOSIS — I73.9 PAD (PERIPHERAL ARTERY DISEASE): ICD-10-CM

## 2025-01-22 DIAGNOSIS — N18.6 ESRD (END STAGE RENAL DISEASE): ICD-10-CM

## 2025-01-22 DIAGNOSIS — I25.110 ATHEROSCLEROSIS OF NATIVE CORONARY ARTERY OF NATIVE HEART WITH UNSTABLE ANGINA PECTORIS: ICD-10-CM

## 2025-01-22 DIAGNOSIS — Z95.828 S/P ARTERIOVENOUS (AV) GRAFT PLACEMENT: ICD-10-CM

## 2025-01-22 DIAGNOSIS — I20.0 UNSTABLE ANGINA PECTORIS: ICD-10-CM

## 2025-01-22 LAB
ALBUMIN SERPL BCP-MCNC: 3.1 G/DL (ref 3.5–5.2)
ALP SERPL-CCNC: 70 U/L (ref 40–150)
ALT SERPL W/O P-5'-P-CCNC: 5 U/L (ref 10–44)
ANION GAP SERPL CALC-SCNC: 20 MMOL/L (ref 8–16)
AST SERPL-CCNC: 12 U/L (ref 10–40)
BASOPHILS # BLD AUTO: 0.03 K/UL (ref 0–0.2)
BASOPHILS NFR BLD: 0.3 % (ref 0–1.9)
BILIRUB SERPL-MCNC: 0.6 MG/DL (ref 0.1–1)
BUN SERPL-MCNC: 73 MG/DL (ref 8–23)
CALCIUM SERPL-MCNC: 7.4 MG/DL (ref 8.7–10.5)
CHLORIDE SERPL-SCNC: 98 MMOL/L (ref 95–110)
CO2 SERPL-SCNC: 19 MMOL/L (ref 23–29)
CREAT SERPL-MCNC: 6.8 MG/DL (ref 0.5–1.4)
DIFFERENTIAL METHOD BLD: ABNORMAL
EOSINOPHIL # BLD AUTO: 0.1 K/UL (ref 0–0.5)
EOSINOPHIL NFR BLD: 0.6 % (ref 0–8)
ERYTHROCYTE [DISTWIDTH] IN BLOOD BY AUTOMATED COUNT: 15.2 % (ref 11.5–14.5)
EST. GFR  (NO RACE VARIABLE): 8 ML/MIN/1.73 M^2
GLUCOSE SERPL-MCNC: 101 MG/DL (ref 70–110)
HCT VFR BLD AUTO: 32.4 % (ref 40–54)
HGB BLD-MCNC: 10.5 G/DL (ref 14–18)
IMM GRANULOCYTES # BLD AUTO: 0.03 K/UL (ref 0–0.04)
IMM GRANULOCYTES NFR BLD AUTO: 0.3 % (ref 0–0.5)
LYMPHOCYTES # BLD AUTO: 0.5 K/UL (ref 1–4.8)
LYMPHOCYTES NFR BLD: 5.3 % (ref 18–48)
MCH RBC QN AUTO: 26.6 PG (ref 27–31)
MCHC RBC AUTO-ENTMCNC: 32.4 G/DL (ref 32–36)
MCV RBC AUTO: 82 FL (ref 82–98)
MONOCYTES # BLD AUTO: 1 K/UL (ref 0.3–1)
MONOCYTES NFR BLD: 10.9 % (ref 4–15)
NEUTROPHILS # BLD AUTO: 7.8 K/UL (ref 1.8–7.7)
NEUTROPHILS NFR BLD: 82.6 % (ref 38–73)
NRBC BLD-RTO: 0 /100 WBC
PLATELET # BLD AUTO: 155 K/UL (ref 150–450)
PMV BLD AUTO: 9.8 FL (ref 9.2–12.9)
POTASSIUM SERPL-SCNC: 2.8 MMOL/L (ref 3.5–5.1)
PROT SERPL-MCNC: 7 G/DL (ref 6–8.4)
RBC # BLD AUTO: 3.94 M/UL (ref 4.6–6.2)
SODIUM SERPL-SCNC: 137 MMOL/L (ref 136–145)
TROPONIN I SERPL DL<=0.01 NG/ML-MCNC: 0.05 NG/ML (ref 0–0.03)
WBC # BLD AUTO: 9.4 K/UL (ref 3.9–12.7)

## 2025-01-22 PROCEDURE — 84484 ASSAY OF TROPONIN QUANT: CPT | Mod: 91 | Performed by: STUDENT IN AN ORGANIZED HEALTH CARE EDUCATION/TRAINING PROGRAM

## 2025-01-22 PROCEDURE — 25000003 PHARM REV CODE 250: Performed by: STUDENT IN AN ORGANIZED HEALTH CARE EDUCATION/TRAINING PROGRAM

## 2025-01-22 PROCEDURE — 85025 COMPLETE CBC W/AUTO DIFF WBC: CPT | Performed by: STUDENT IN AN ORGANIZED HEALTH CARE EDUCATION/TRAINING PROGRAM

## 2025-01-22 PROCEDURE — G0378 HOSPITAL OBSERVATION PER HR: HCPCS

## 2025-01-22 PROCEDURE — 80053 COMPREHEN METABOLIC PANEL: CPT | Performed by: STUDENT IN AN ORGANIZED HEALTH CARE EDUCATION/TRAINING PROGRAM

## 2025-01-22 PROCEDURE — 83880 ASSAY OF NATRIURETIC PEPTIDE: CPT | Performed by: STUDENT IN AN ORGANIZED HEALTH CARE EDUCATION/TRAINING PROGRAM

## 2025-01-22 PROCEDURE — 99285 EMERGENCY DEPT VISIT HI MDM: CPT | Mod: 25

## 2025-01-22 RX ORDER — ACETAMINOPHEN 500 MG
1000 TABLET ORAL
Status: COMPLETED | OUTPATIENT
Start: 2025-01-22 | End: 2025-01-22

## 2025-01-22 RX ORDER — NAPROXEN SODIUM 220 MG/1
162 TABLET, FILM COATED ORAL
Status: COMPLETED | OUTPATIENT
Start: 2025-01-22 | End: 2025-01-22

## 2025-01-22 RX ORDER — GLUCAGON 1 MG
1 KIT INJECTION
Status: DISCONTINUED | OUTPATIENT
Start: 2025-01-22 | End: 2025-01-25 | Stop reason: HOSPADM

## 2025-01-22 RX ORDER — IBUPROFEN 200 MG
24 TABLET ORAL
Status: DISCONTINUED | OUTPATIENT
Start: 2025-01-22 | End: 2025-01-25 | Stop reason: HOSPADM

## 2025-01-22 RX ORDER — NALOXONE HCL 0.4 MG/ML
0.02 VIAL (ML) INJECTION
Status: DISCONTINUED | OUTPATIENT
Start: 2025-01-22 | End: 2025-01-25 | Stop reason: HOSPADM

## 2025-01-22 RX ORDER — ONDANSETRON HYDROCHLORIDE 2 MG/ML
4 INJECTION, SOLUTION INTRAVENOUS EVERY 8 HOURS PRN
Status: DISCONTINUED | OUTPATIENT
Start: 2025-01-22 | End: 2025-01-25 | Stop reason: HOSPADM

## 2025-01-22 RX ORDER — SODIUM CHLORIDE 0.9 % (FLUSH) 0.9 %
10 SYRINGE (ML) INJECTION EVERY 12 HOURS PRN
Status: DISCONTINUED | OUTPATIENT
Start: 2025-01-22 | End: 2025-01-25 | Stop reason: HOSPADM

## 2025-01-22 RX ORDER — IBUPROFEN 200 MG
16 TABLET ORAL
Status: DISCONTINUED | OUTPATIENT
Start: 2025-01-22 | End: 2025-01-25 | Stop reason: HOSPADM

## 2025-01-22 RX ADMIN — ACETAMINOPHEN 1000 MG: 500 TABLET ORAL at 08:01

## 2025-01-22 RX ADMIN — ASPIRIN 81 MG CHEWABLE TABLET 162 MG: 81 TABLET CHEWABLE at 08:01

## 2025-01-22 RX ADMIN — NITROGLYCERIN 1 INCH: 20 OINTMENT TOPICAL at 08:01

## 2025-01-22 RX ADMIN — POTASSIUM BICARBONATE 50 MEQ: 978 TABLET, EFFERVESCENT ORAL at 09:01

## 2025-01-22 NOTE — Clinical Note
270 ml of contrast were injected throughout the case. 30 mL of contrast was the total wasted during the case. 300 mL was the total amount used during the case.

## 2025-01-22 NOTE — Clinical Note
Pt. Is awake, alert and oriented x 3. Pt. Denies c/o chest pain or discomfort. Skin is PWD. Pt. Transported to ICU on continuous cardiac, BP and pulse oximetry monitors.

## 2025-01-22 NOTE — Clinical Note
Pt went unresponsive. Jaw thrusting initiated. HR: 46 with no treatment needed. BP: 80/40s; O2 sats remained stable.

## 2025-01-23 PROBLEM — E87.6 HYPOKALEMIA: Status: ACTIVE | Noted: 2025-01-23

## 2025-01-23 PROBLEM — R79.89 ELEVATED TROPONIN: Status: ACTIVE | Noted: 2025-01-23

## 2025-01-23 PROBLEM — I25.10 CAD (CORONARY ARTERY DISEASE): Status: ACTIVE | Noted: 2025-01-23

## 2025-01-23 PROBLEM — I20.0 UNSTABLE ANGINA PECTORIS: Status: ACTIVE | Noted: 2025-01-23

## 2025-01-23 LAB
ALBUMIN SERPL BCP-MCNC: 2.9 G/DL (ref 3.5–5.2)
ALP SERPL-CCNC: 67 U/L (ref 40–150)
ALT SERPL W/O P-5'-P-CCNC: 6 U/L (ref 10–44)
ANION GAP SERPL CALC-SCNC: 15 MMOL/L (ref 8–16)
APTT PPP: 34.7 SEC (ref 21–32)
AST SERPL-CCNC: 9 U/L (ref 10–40)
BASOPHILS # BLD AUTO: 0.02 K/UL (ref 0–0.2)
BASOPHILS # BLD AUTO: 0.03 K/UL (ref 0–0.2)
BASOPHILS NFR BLD: 0.4 % (ref 0–1.9)
BASOPHILS NFR BLD: 0.4 % (ref 0–1.9)
BILIRUB SERPL-MCNC: 0.7 MG/DL (ref 0.1–1)
BNP SERPL-MCNC: 715 PG/ML (ref 0–99)
BUN SERPL-MCNC: 77 MG/DL (ref 8–23)
CALCIUM SERPL-MCNC: 7.6 MG/DL (ref 8.7–10.5)
CHLORIDE SERPL-SCNC: 99 MMOL/L (ref 95–110)
CHOLEST SERPL-MCNC: 125 MG/DL (ref 120–199)
CHOLEST/HDLC SERPL: 2.5 {RATIO} (ref 2–5)
CO2 SERPL-SCNC: 22 MMOL/L (ref 23–29)
CREAT SERPL-MCNC: 6.8 MG/DL (ref 0.5–1.4)
DIFFERENTIAL METHOD BLD: ABNORMAL
DIFFERENTIAL METHOD BLD: ABNORMAL
EOSINOPHIL # BLD AUTO: 0.1 K/UL (ref 0–0.5)
EOSINOPHIL # BLD AUTO: 0.1 K/UL (ref 0–0.5)
EOSINOPHIL NFR BLD: 1.5 % (ref 0–8)
EOSINOPHIL NFR BLD: 1.6 % (ref 0–8)
ERYTHROCYTE [DISTWIDTH] IN BLOOD BY AUTOMATED COUNT: 15 % (ref 11.5–14.5)
ERYTHROCYTE [DISTWIDTH] IN BLOOD BY AUTOMATED COUNT: 15.1 % (ref 11.5–14.5)
EST. GFR  (NO RACE VARIABLE): 8 ML/MIN/1.73 M^2
GLUCOSE SERPL-MCNC: 94 MG/DL (ref 70–110)
HCT VFR BLD AUTO: 30.2 % (ref 40–54)
HCT VFR BLD AUTO: 34.9 % (ref 40–54)
HDLC SERPL-MCNC: 50 MG/DL (ref 40–75)
HDLC SERPL: 40 % (ref 20–50)
HGB BLD-MCNC: 11.5 G/DL (ref 14–18)
HGB BLD-MCNC: 9.9 G/DL (ref 14–18)
IMM GRANULOCYTES # BLD AUTO: 0.01 K/UL (ref 0–0.04)
IMM GRANULOCYTES # BLD AUTO: 0.02 K/UL (ref 0–0.04)
IMM GRANULOCYTES NFR BLD AUTO: 0.2 % (ref 0–0.5)
IMM GRANULOCYTES NFR BLD AUTO: 0.3 % (ref 0–0.5)
INR PPP: 1.3 (ref 0.8–1.2)
LDLC SERPL CALC-MCNC: 59.8 MG/DL (ref 63–159)
LYMPHOCYTES # BLD AUTO: 0.9 K/UL (ref 1–4.8)
LYMPHOCYTES # BLD AUTO: 1.3 K/UL (ref 1–4.8)
LYMPHOCYTES NFR BLD: 15.2 % (ref 18–48)
LYMPHOCYTES NFR BLD: 16.8 % (ref 18–48)
MAGNESIUM SERPL-MCNC: 1.9 MG/DL (ref 1.6–2.6)
MCH RBC QN AUTO: 26.7 PG (ref 27–31)
MCH RBC QN AUTO: 26.9 PG (ref 27–31)
MCHC RBC AUTO-ENTMCNC: 32.8 G/DL (ref 32–36)
MCHC RBC AUTO-ENTMCNC: 33 G/DL (ref 32–36)
MCV RBC AUTO: 81 FL (ref 82–98)
MCV RBC AUTO: 82 FL (ref 82–98)
MONOCYTES # BLD AUTO: 0.8 K/UL (ref 0.3–1)
MONOCYTES # BLD AUTO: 1.1 K/UL (ref 0.3–1)
MONOCYTES NFR BLD: 13.8 % (ref 4–15)
MONOCYTES NFR BLD: 14.7 % (ref 4–15)
NEUTROPHILS # BLD AUTO: 3.9 K/UL (ref 1.8–7.7)
NEUTROPHILS # BLD AUTO: 4.9 K/UL (ref 1.8–7.7)
NEUTROPHILS NFR BLD: 66.3 % (ref 38–73)
NEUTROPHILS NFR BLD: 68.8 % (ref 38–73)
NONHDLC SERPL-MCNC: 75 MG/DL
NRBC BLD-RTO: 0 /100 WBC
NRBC BLD-RTO: 0 /100 WBC
PLATELET # BLD AUTO: 137 K/UL (ref 150–450)
PLATELET # BLD AUTO: 142 K/UL (ref 150–450)
PMV BLD AUTO: 10 FL (ref 9.2–12.9)
PMV BLD AUTO: 9.8 FL (ref 9.2–12.9)
POTASSIUM SERPL-SCNC: 2.9 MMOL/L (ref 3.5–5.1)
PROT SERPL-MCNC: 6.6 G/DL (ref 6–8.4)
PROTHROMBIN TIME: 14.5 SEC (ref 9–12.5)
RBC # BLD AUTO: 3.68 M/UL (ref 4.6–6.2)
RBC # BLD AUTO: 4.31 M/UL (ref 4.6–6.2)
SODIUM SERPL-SCNC: 136 MMOL/L (ref 136–145)
TRIGL SERPL-MCNC: 76 MG/DL (ref 30–150)
TROPONIN I SERPL DL<=0.01 NG/ML-MCNC: 0.07 NG/ML (ref 0–0.03)
TROPONIN I SERPL DL<=0.01 NG/ML-MCNC: 0.07 NG/ML (ref 0–0.03)
TROPONIN I SERPL DL<=0.01 NG/ML-MCNC: 0.08 NG/ML (ref 0–0.03)
TROPONIN I SERPL DL<=0.01 NG/ML-MCNC: 0.08 NG/ML (ref 0–0.03)
WBC # BLD AUTO: 5.59 K/UL (ref 3.9–12.7)
WBC # BLD AUTO: 7.44 K/UL (ref 3.9–12.7)

## 2025-01-23 PROCEDURE — G0378 HOSPITAL OBSERVATION PER HR: HCPCS

## 2025-01-23 PROCEDURE — 80100016 HC MAINTENANCE HEMODIALYSIS

## 2025-01-23 PROCEDURE — 96366 THER/PROPH/DIAG IV INF ADDON: CPT

## 2025-01-23 PROCEDURE — 84484 ASSAY OF TROPONIN QUANT: CPT | Mod: 91 | Performed by: REGISTERED NURSE

## 2025-01-23 PROCEDURE — 5A1D70Z PERFORMANCE OF URINARY FILTRATION, INTERMITTENT, LESS THAN 6 HOURS PER DAY: ICD-10-PCS | Performed by: INTERNAL MEDICINE

## 2025-01-23 PROCEDURE — 80053 COMPREHEN METABOLIC PANEL: CPT | Performed by: REGISTERED NURSE

## 2025-01-23 PROCEDURE — 85730 THROMBOPLASTIN TIME PARTIAL: CPT | Performed by: STUDENT IN AN ORGANIZED HEALTH CARE EDUCATION/TRAINING PROGRAM

## 2025-01-23 PROCEDURE — 83735 ASSAY OF MAGNESIUM: CPT | Performed by: REGISTERED NURSE

## 2025-01-23 PROCEDURE — 63600175 PHARM REV CODE 636 W HCPCS: Performed by: STUDENT IN AN ORGANIZED HEALTH CARE EDUCATION/TRAINING PROGRAM

## 2025-01-23 PROCEDURE — 84484 ASSAY OF TROPONIN QUANT: CPT | Mod: 91 | Performed by: INTERNAL MEDICINE

## 2025-01-23 PROCEDURE — 85025 COMPLETE CBC W/AUTO DIFF WBC: CPT | Performed by: REGISTERED NURSE

## 2025-01-23 PROCEDURE — 80061 LIPID PANEL: CPT | Performed by: REGISTERED NURSE

## 2025-01-23 PROCEDURE — 96365 THER/PROPH/DIAG IV INF INIT: CPT

## 2025-01-23 PROCEDURE — 85610 PROTHROMBIN TIME: CPT | Performed by: STUDENT IN AN ORGANIZED HEALTH CARE EDUCATION/TRAINING PROGRAM

## 2025-01-23 PROCEDURE — 25000003 PHARM REV CODE 250: Performed by: REGISTERED NURSE

## 2025-01-23 PROCEDURE — 84484 ASSAY OF TROPONIN QUANT: CPT | Performed by: REGISTERED NURSE

## 2025-01-23 PROCEDURE — 25000003 PHARM REV CODE 250: Performed by: INTERNAL MEDICINE

## 2025-01-23 PROCEDURE — G0257 UNSCHED DIALYSIS ESRD PT HOS: HCPCS

## 2025-01-23 PROCEDURE — 85025 COMPLETE CBC W/AUTO DIFF WBC: CPT | Mod: 91 | Performed by: STUDENT IN AN ORGANIZED HEALTH CARE EDUCATION/TRAINING PROGRAM

## 2025-01-23 RX ORDER — RANOLAZINE 500 MG/1
1000 TABLET, EXTENDED RELEASE ORAL 2 TIMES DAILY
Status: DISCONTINUED | OUTPATIENT
Start: 2025-01-23 | End: 2025-01-25 | Stop reason: HOSPADM

## 2025-01-23 RX ORDER — HEPARIN SODIUM,PORCINE/D5W 25000/250
0-40 INTRAVENOUS SOLUTION INTRAVENOUS CONTINUOUS
Status: DISCONTINUED | OUTPATIENT
Start: 2025-01-23 | End: 2025-01-25

## 2025-01-23 RX ORDER — CARVEDILOL 25 MG/1
25 TABLET ORAL 2 TIMES DAILY WITH MEALS
Status: DISCONTINUED | OUTPATIENT
Start: 2025-01-23 | End: 2025-01-25 | Stop reason: HOSPADM

## 2025-01-23 RX ORDER — SPIRONOLACTONE 25 MG/1
25 TABLET ORAL DAILY
Status: DISCONTINUED | OUTPATIENT
Start: 2025-01-23 | End: 2025-01-23

## 2025-01-23 RX ORDER — SODIUM CHLORIDE 9 MG/ML
INJECTION, SOLUTION INTRAVENOUS
Status: CANCELLED | OUTPATIENT
Start: 2025-01-23

## 2025-01-23 RX ORDER — ATORVASTATIN CALCIUM 40 MG/1
80 TABLET, FILM COATED ORAL DAILY
Status: DISCONTINUED | OUTPATIENT
Start: 2025-01-23 | End: 2025-01-25 | Stop reason: HOSPADM

## 2025-01-23 RX ORDER — CLOPIDOGREL BISULFATE 75 MG/1
75 TABLET ORAL DAILY
Status: DISCONTINUED | OUTPATIENT
Start: 2025-01-23 | End: 2025-01-25 | Stop reason: HOSPADM

## 2025-01-23 RX ORDER — MUPIROCIN 20 MG/G
OINTMENT TOPICAL 2 TIMES DAILY
Status: CANCELLED | OUTPATIENT
Start: 2025-01-23 | End: 2025-01-28

## 2025-01-23 RX ORDER — HYDROXYZINE PAMOATE 25 MG/1
25 CAPSULE ORAL NIGHTLY PRN
Status: DISCONTINUED | OUTPATIENT
Start: 2025-01-23 | End: 2025-01-25 | Stop reason: HOSPADM

## 2025-01-23 RX ORDER — LEVOTHYROXINE SODIUM 75 UG/1
75 TABLET ORAL
Status: DISCONTINUED | OUTPATIENT
Start: 2025-01-23 | End: 2025-01-25 | Stop reason: HOSPADM

## 2025-01-23 RX ORDER — SODIUM CHLORIDE 9 MG/ML
INJECTION, SOLUTION INTRAVENOUS ONCE
Status: CANCELLED | OUTPATIENT
Start: 2025-01-23 | End: 2025-01-23

## 2025-01-23 RX ORDER — SPIRONOLACTONE 25 MG/1
50 TABLET ORAL DAILY
Status: DISCONTINUED | OUTPATIENT
Start: 2025-01-24 | End: 2025-01-25 | Stop reason: HOSPADM

## 2025-01-23 RX ORDER — NITROGLYCERIN 0.4 MG/1
0.4 TABLET SUBLINGUAL EVERY 5 MIN PRN
Status: DISCONTINUED | OUTPATIENT
Start: 2025-01-23 | End: 2025-01-25 | Stop reason: HOSPADM

## 2025-01-23 RX ORDER — ISOSORBIDE MONONITRATE 30 MG/1
30 TABLET, EXTENDED RELEASE ORAL DAILY
Status: DISCONTINUED | OUTPATIENT
Start: 2025-01-23 | End: 2025-01-25 | Stop reason: HOSPADM

## 2025-01-23 RX ORDER — HYDRALAZINE HYDROCHLORIDE 25 MG/1
50 TABLET, FILM COATED ORAL EVERY 8 HOURS
Status: DISCONTINUED | OUTPATIENT
Start: 2025-01-23 | End: 2025-01-25 | Stop reason: HOSPADM

## 2025-01-23 RX ORDER — AMLODIPINE BESYLATE 5 MG/1
5 TABLET ORAL DAILY
Status: DISCONTINUED | OUTPATIENT
Start: 2025-01-23 | End: 2025-01-25 | Stop reason: HOSPADM

## 2025-01-23 RX ORDER — IPRATROPIUM BROMIDE AND ALBUTEROL SULFATE 2.5; .5 MG/3ML; MG/3ML
3 SOLUTION RESPIRATORY (INHALATION) EVERY 6 HOURS PRN
Status: DISCONTINUED | OUTPATIENT
Start: 2025-01-23 | End: 2025-01-25 | Stop reason: HOSPADM

## 2025-01-23 RX ADMIN — POTASSIUM BICARBONATE 25 MEQ: 978 TABLET, EFFERVESCENT ORAL at 10:01

## 2025-01-23 RX ADMIN — ISOSORBIDE MONONITRATE 30 MG: 30 TABLET, EXTENDED RELEASE ORAL at 08:01

## 2025-01-23 RX ADMIN — HYDRALAZINE HYDROCHLORIDE 50 MG: 25 TABLET ORAL at 09:01

## 2025-01-23 RX ADMIN — RANOLAZINE 1000 MG: 500 TABLET, FILM COATED, EXTENDED RELEASE ORAL at 09:01

## 2025-01-23 RX ADMIN — CARVEDILOL 25 MG: 25 TABLET, FILM COATED ORAL at 06:01

## 2025-01-23 RX ADMIN — ATORVASTATIN CALCIUM 80 MG: 40 TABLET, FILM COATED ORAL at 08:01

## 2025-01-23 RX ADMIN — HEPARIN SODIUM 12 UNITS/KG/HR: 10000 INJECTION, SOLUTION INTRAVENOUS at 06:01

## 2025-01-23 RX ADMIN — HYDRALAZINE HYDROCHLORIDE 50 MG: 25 TABLET ORAL at 06:01

## 2025-01-23 RX ADMIN — LEVOTHYROXINE SODIUM 75 MCG: 0.05 TABLET ORAL at 06:01

## 2025-01-23 RX ADMIN — POTASSIUM BICARBONATE 25 MEQ: 978 TABLET, EFFERVESCENT ORAL at 08:01

## 2025-01-23 RX ADMIN — CLOPIDOGREL BISULFATE 75 MG: 75 TABLET ORAL at 08:01

## 2025-01-23 RX ADMIN — SPIRONOLACTONE 25 MG: 25 TABLET, FILM COATED ORAL at 08:01

## 2025-01-23 RX ADMIN — RANOLAZINE 1000 MG: 500 TABLET, FILM COATED, EXTENDED RELEASE ORAL at 08:01

## 2025-01-23 RX ADMIN — CARVEDILOL 25 MG: 25 TABLET, FILM COATED ORAL at 08:01

## 2025-01-23 RX ADMIN — AMLODIPINE BESYLATE 5 MG: 5 TABLET ORAL at 08:01

## 2025-01-23 NOTE — CONSULTS
NEPHROLOGY CONSULT NOTE    HPI & INTERVAL HISTORY:    Past Medical History:   Diagnosis Date    Acute congestive heart failure 9/13/2024    Allergy     Anemia     Anticoagulant long-term use     Arthritis     CKD (chronic kidney disease) stage 4, GFR 15-29 ml/min     COPD (chronic obstructive pulmonary disease) 08/20/2021    Coronary artery disease     Heart attack 10/04/2019    Hematuria     Hemothorax     Hyperlipidemia     Hypertension     Hyperuricemia     Hypocalcemia     Hypokalemia     Hypophosphatemia     Hypothyroidism 3/2/2023    PAD (peripheral artery disease)     Proteinuria     Vitamin D deficiency       Past Surgical History:   Procedure Laterality Date    ABDOMINAL AORTOGRAPHY N/A 5/10/2019    Procedure: AORTOGRAM-ABDOMINAL;  Surgeon: Keo Jenkins MD;  Location: [facility] CATH LAB/EP;  Service: Cardiology;  Laterality: N/A;  RSFA intervention     AORTOGRAPHY WITH SERIALOGRAPHY N/A 12/3/2021    Procedure: AORTOGRAM, WITH SERIALOGRAPHY;  Surgeon: Keo Jenkins MD;  Location: [facility] CATH LAB/EP;  Service: Cardiology;  Laterality: N/A;    AORTOGRAPHY WITH SERIALOGRAPHY N/A 4/1/2022    Procedure: AORTOGRAM, WITH SERIALOGRAPHY;  Surgeon: Keo Jenkins MD;  Location: [facility] CATH LAB/EP;  Service: Cardiology;  Laterality: N/A;    ATHERECTOMY, CORONARY N/A 4/25/2023    Procedure: Atherectomy-coronary;  Surgeon: Keo Jenkins MD;  Location: [facility] CATH LAB/EP;  Service: Cardiology;  Laterality: N/A;    BONE MARROW BIOPSY Right 10/12/2021    Procedure: BIOPSY-BONE MARROW;  Surgeon: Naseem Woods MD;  Location: [facility] OR;  Service: Oncology;  Laterality: Right;    CARDIAC CATHETERIZATION      CATARACT EXTRACTION      CATARACT EXTRACTION W/  INTRAOCULAR LENS IMPLANT Right 6/8/2020    Procedure: EXTRACTION, CATARACT, WITH IOL INSERTION;  Surgeon: Tin Light MD;  Location: [facility] OR;  Service: Ophthalmology;  Laterality: Right;    CATARACT EXTRACTION W/  INTRAOCULAR LENS IMPLANT Left 7/2/2020    Procedure:  EXTRACTION, CATARACT, WITH IOL INSERTION;  Surgeon: Tin Light MD;  Location: [facility];  Service: Ophthalmology;  Laterality: Left;    COLONOSCOPY N/A 1/6/2022    Procedure: COLONOSCOPY;  Surgeon: Kathe Penaloza MD;  Location: [facility] NARGIS (2ND FLR);  Service: Endoscopy;  Laterality: N/A;  COVID test at [facility] on 1/3-GT  okay to hold Plavix for 5 days and aspirin per Dr. Becerra  2nd floor due toextensive cardiac history   instructions mailed and my [facility] portal -     CORONARY ANGIOGRAPHY N/A 3/29/2019    Procedure: ANGIOGRAM, CORONARY ARTERY;  Surgeon: Keo Jenkins MD;  Location: [facility] CATH LAB/EP;  Service: Cardiology;  Laterality: N/A;    CORONARY ANGIOGRAPHY Left 10/11/2019    Procedure: ANGIOGRAM, CORONARY ARTERY;  Surgeon: Keo Jenkins MD;  Location: [facility] CATH LAB/EP;  Service: Cardiology;  Laterality: Left;    CORONARY ANGIOGRAPHY N/A 4/10/2023    Procedure: ANGIOGRAM, CORONARY ARTERY;  Surgeon: Keo Jenkins MD;  Location: [facility] CATH LAB/EP;  Service: Cardiology;  Laterality: N/A;    CORONARY ANGIOGRAPHY N/A 6/26/2024    Procedure: ANGIOGRAM, CORONARY ARTERY;  Surgeon: Enoch Tirado MD;  Location: [facility] CATH LAB/EP;  Service: Cardiology;  Laterality: N/A;    CORONARY ANGIOGRAPHY N/A 9/16/2024    Procedure: ANGIOGRAM, CORONARY ARTERY;  Surgeon: Enoch Tirado MD;  Location: [facility] CATH LAB/EP;  Service: Cardiology;  Laterality: N/A;    CORONARY ANGIOPLASTY WITH STENT PLACEMENT  03/29/2019    mid and distal RCA    ESOPHAGOGASTRODUODENOSCOPY N/A 1/6/2022    Procedure: EGD (ESOPHAGOGASTRODUODENOSCOPY);  Surgeon: Kathe Penaloza MD;  Location: [facility] NARGIS (2ND FLR);  Service: Endoscopy;  Laterality: N/A;    EYE SURGERY      INSERTION OF TUNNELED CENTRAL VENOUS HEMODIALYSIS CATHETER N/A 2/9/2024    Procedure: INSERTION, CATHETER, HEMODIALYSIS, DUAL LUMEN;  Surgeon: Wang Mendieta MD;  Location: [facility] OR;  Service: General;  Laterality: N/A;    INSTANTANEOUS WAVE-FREE RATIO (IFR) N/A  4/25/2023    Procedure: Instantaneous Wave-Free Ratio (IFR);  Surgeon: Keo Jenkins MD;  Location: [facility] CATH LAB/EP;  Service: Cardiology;  Laterality: N/A;    INTRAVASCULAR ULTRASOUND, NON-CORONARY  8/12/2022    Procedure: Intravascular Ultrasound, Non-Coronary;  Surgeon: Keo Jenkins MD;  Location: [facility] CATH LAB/EP;  Service: Cardiology;;    IVUS, CORONARY  4/25/2023    Procedure: IVUS, Coronary;  Surgeon: Keo Jenkins MD;  Location: [facility] CATH LAB/EP;  Service: Cardiology;;    IVUS, CORONARY  5/16/2023    Procedure: IVUS, Coronary;  Surgeon: Keo Jenkins MD;  Location: [facility] CATH LAB/EP;  Service: Cardiology;;  CX    LEFT HEART CATHETERIZATION N/A 3/29/2019    Procedure: Left heart cath;  Surgeon: Keo Jenkins MD;  Location: [facility] CATH LAB/EP;  Service: Cardiology;  Laterality: N/A;    LEFT HEART CATHETERIZATION Left 10/8/2019    Procedure: Left heart cath;  Surgeon: Keo Jenkins MD;  Location: [facility] CATH LAB/EP;  Service: Cardiology;  Laterality: Left;    LEFT HEART CATHETERIZATION Left 4/10/2023    Procedure: Left heart cath;  Surgeon: Keo Jenkins MD;  Location: [facility] CATH LAB/EP;  Service: Cardiology;  Laterality: Left;    LEFT HEART CATHETERIZATION Left 4/25/2023    Procedure: Left heart cath;  Surgeon: Keo Jenkins MD;  Location: [facility] CATH LAB/EP;  Service: Cardiology;  Laterality: Left;    LEFT HEART CATHETERIZATION Left 5/16/2023    Procedure: Left heart cath;  Surgeon: Keo Jenkins MD;  Location: [facility] CATH LAB/EP;  Service: Cardiology;  Laterality: Left;    LEFT HEART CATHETERIZATION Left 6/26/2024    Procedure: Left heart cath;  Surgeon: Enoch Tirado MD;  Location: [facility] CATH LAB/EP;  Service: Cardiology;  Laterality: Left;    LEFT HEART CATHETERIZATION Left 9/16/2024    Procedure: Left heart cath;  Surgeon: Enoch Tirado MD;  Location: [facility] CATH LAB/EP;  Service: Cardiology;  Laterality: Left;    LITHOTRIPSY, CORONARY TRANSLUMINAL, PERCUTANEOUS  9/16/2024     Procedure: LITHOTRIPSY, CORONARY TRANSLUMINAL, PERCUTANEOUS;  Surgeon: Enoch Tirado MD;  Location: [facility] CATH LAB/EP;  Service: Cardiology;;    PERCUTANEOUS CORONARY INTERVENTION, ARTERY N/A 6/26/2024    Procedure: Percutaneous coronary intervention;  Surgeon: Enoch Tirado MD;  Location: [facility] CATH LAB/EP;  Service: Cardiology;  Laterality: N/A;    PERCUTANEOUS TRANSLUMINAL ANGIOPLASTY (PTA) OF PERIPHERAL VESSEL N/A 7/12/2019    Procedure: PTA, PERIPHERAL VESSEL;  Surgeon: Keo Jenkins MD;  Location: [facility] CATH LAB/EP;  Service: Cardiology;  Laterality: N/A;    PERCUTANEOUS TRANSLUMINAL ANGIOPLASTY (PTA) OF PERIPHERAL VESSEL Left 5/20/2022    Procedure: PTA, PERIPHERAL VESSEL;  Surgeon: Keo Jenkins MD;  Location: [facility] CATH LAB/EP;  Service: Cardiology;  Laterality: Left;    PERCUTANEOUS TRANSLUMINAL ANGIOPLASTY (PTA) OF PERIPHERAL VESSEL Right 8/12/2022    Procedure: PTA, PERIPHERAL VESSEL;  Surgeon: Keo Jenkins MD;  Location: [facility] CATH LAB/EP;  Service: Cardiology;  Laterality: Right;    PERCUTANEOUS TRANSLUMINAL BALLOON ANGIOPLASTY OF CORONARY ARTERY  4/25/2023    Procedure: Angioplasty-coronary;  Surgeon: Keo Jenkins MD;  Location: [facility] CATH LAB/EP;  Service: Cardiology;;    PLACEMENT OF ARTERIOVENOUS GRAFT Left 4/15/2024    Procedure: INSERTION, GRAFT, ARTERIOVENOUS;  Surgeon: Scott Pascual MD;  Location: [facility] OR;  Service: General;  Laterality: Left;    PTCA, SINGLE VESSEL  5/16/2023    Procedure: PTCA, Single Vessel;  Surgeon: Keo Jenkins MD;  Location: [facility] CATH LAB/EP;  Service: Cardiology;;  CX    PTCA, SINGLE VESSEL  6/26/2024    Procedure: PTCA, Single Vessel;  Surgeon: Enoch Tirado MD;  Location: [facility] CATH LAB/EP;  Service: Cardiology;;    PTCA, SINGLE VESSEL Left 9/16/2024    Procedure: PTCA, Single Vessel;  Surgeon: Enoch Tirado MD;  Location: [facility] CATH LAB/EP;  Service: Cardiology;  Laterality: Left;    REMOVAL OF HEMODIALYSIS CATHETER Right  2024    Procedure: REMOVAL, CATHETER, HEMODIALYSIS;  Surgeon: Wang Mendieta MD;  Location: [facility] OR;  Service: General;  Laterality: Right;    REMOVAL OF TUNNELED CENTRAL VENOUS CATHETER (CVC) Right 2024    Procedure: REMOVAL, CATHETER, CENTRAL VENOUS, TUNNELED;  Surgeon: Scott Pascual MD;  Location: [facility] OR;  Service: General;  Laterality: Right;    REPAIR, CHRONIC TOTAL OCCLUSION, CORONARY  2024    Procedure: Repair, Chronic Total Occlusion, Coronary;  Surgeon: Enoch Tirado MD;  Location: [facility] CATH LAB/EP;  Service: Cardiology;;    STENT, DRUG ELUTING, SINGLE VESSEL, CORONARY  2023    Procedure: Stent, Drug Eluting, Single Vessel, Coronary;  Surgeon: Keo Jenkins MD;  Location: [facility] CATH LAB/EP;  Service: Cardiology;;    STENT, DRUG ELUTING, SINGLE VESSEL, CORONARY  2023    Procedure: Stent, Drug Eluting, Single Vessel, Coronary;  Surgeon: Keo Jenkins MD;  Location: [facility] CATH LAB/EP;  Service: Cardiology;;  CX    TRANSESOPHAGEAL ECHOCARDIOGRAM WITH POSSIBLE CARDIOVERSION (JOSE W/ POSS CARDIOVERSION) N/A 2023    Procedure: Transesophageal echo (JOSE) intra-procedure log documentation;  Surgeon: Keo Jenkins MD;  Location: [facility] CATH LAB/EP;  Service: Cardiology;  Laterality: N/A;      Review of patient's allergies indicates:   Allergen Reactions    Ace inhibitors Rash      (Not in a hospital admission)      Social History     Socioeconomic History    Marital status:    Occupational History     Employer: [employer]   Tobacco Use    Smoking status: Former     Current packs/day: 0.00     Types: Cigarettes     Quit date: 1999     Years since quittin.7     Passive exposure: Never    Smokeless tobacco: Never   Substance and Sexual Activity    Alcohol use: No     Alcohol/week: 0.0 standard drinks of alcohol    Drug use: No    Sexual activity: Yes     Partners: Female     Social Drivers of Health     Financial Resource Strain: Low Risk  (2024)     Overall Financial Resource Strain (CARDIA)     Difficulty of Paying Living Expenses: Not hard at all   Recent Concern: Financial Resource Strain - Medium Risk (9/10/2024)    Overall Financial Resource Strain (CARDIA)     Difficulty of Paying Living Expenses: Somewhat hard   Food Insecurity: No Food Insecurity (9/16/2024)    Hunger Vital Sign     Worried About Running Out of Food in the Last Year: Never true     Ran Out of Food in the Last Year: Never true   Transportation Needs: No Transportation Needs (9/16/2024)    TRANSPORTATION NEEDS     Transportation : No   Physical Activity: Patient Unable To Answer (9/16/2024)    Exercise Vital Sign     Days of Exercise per Week: Patient unable to answer     Minutes of Exercise per Session: Patient unable to answer   Recent Concern: Physical Activity - Inactive (9/10/2024)    Exercise Vital Sign     Days of Exercise per Week: 0 days     Minutes of Exercise per Session: 0 min   Stress: Patient Unable To Answer (9/16/2024)    [place] [employer] - Occupational Stress Questionnaire     Feeling of Stress : Patient unable to answer   Housing Stability: Low Risk  (9/16/2024)    Housing Stability Vital Sign     Unable to Pay for Housing in the Last Year: No     Homeless in the Last Year: No   Recent Concern: Housing Stability - High Risk (9/10/2024)    Housing Stability Vital Sign     Unable to Pay for Housing in the Last Year: Yes     Homeless in the Last Year: No        MEDS   amLODIPine  5 mg Oral Daily    atorvastatin  80 mg Oral Daily    carvediloL  25 mg Oral BID WM    clopidogreL  75 mg Oral Daily    hydrALAZINE  50 mg Oral Q8H    isosorbide mononitrate  30 mg Oral Daily    levothyroxine  75 mcg Oral Before breakfast    ranolazine  1,000 mg Oral BID    [START ON 1/24/2025] spironolactone  50 mg Oral Daily             CONTINOUS INFUSIONS:      Intake/Output Summary (Last 24 hours) at 1/23/2025 1353  Last data filed at 1/23/2025 1119  Gross per 24 hour  "  Intake --   Output 800 ml   Net -800 ml        HEMODYNAMICS:    Temp:  [99 °F (37.2 °C)] 99 °F (37.2 °C)  Pulse:  [] 74  Resp:  [14-24] 16  SpO2:  [94 %-100 %] 97 %  BP: ()/(54-76) 115/76   General :    Chest pain on admit   No SOB   No cough   No fever   No chills   No nausea   No vomiting   No diarrhea  Cardiology : pulse 74  Pulmonary : RR 16  Pulse oximeter 97 % O2  Abdomen soft   Extremities : no edema   Skin: dry   LABS   Lab Results   Component Value Date    WBC 7.44 01/23/2025    HGB 9.9 (L) 01/23/2025    HCT 30.2 (L) 01/23/2025    MCV 82 01/23/2025     (L) 01/23/2025        Recent Labs   Lab 01/23/25  0356   GLU 94   CALCIUM 7.6*   ALBUMIN 2.9*   PROT 6.6      K 2.9*   CO2 22*   CL 99   BUN 77*   CREATININE 6.8*   ALKPHOS 67   ALT 6*   AST 9*   BILITOT 0.7      Lab Results   Component Value Date    .6 (H) 06/26/2024    CALCIUM 7.6 (L) 01/23/2025    CAION 1.31 07/14/2020    PHOS 3.8 09/13/2024      Lab Results   Component Value Date    IRON 42 (L) 06/26/2024    TIBC 303 06/26/2024    FERRITIN 674 (H) 03/14/2024        ABG  No results for input(s): "PH", "PO2", "PCO2", "HCO3", "BE" in the last 168 hours.      IMAGING:  CXR    ASSESSMENT / PLAN  Admit with chest pain  History CAD  Troponin 0.068  No pain on exam  Blood pressure 115/76  Echo  Left Ventricle: The left ventricle is mildly dilated. There is eccentric hypertrophy. Regional wall motion abnormalities present. See diagram for wall motion findings. Septal motion is consistent with bundle branch block. There is normal systolic function. Biplane (2D) method of discs ejection fraction is 54%. Grade II diastolic dysfunction.    Right Ventricle: Normal right ventricular cavity size. Systolic function is normal. TAPSE is 2.09 cm.    Left Atrium: Left atrium is moderately dilated. The left atrium volume index is 45.2 mL/m2.    Right Atrium: Right atrium is mildly dilated.    Mitral Valve: There is mild regurgitation.    " Pulmonic Valve: There is mild regurgitation.    Pulmonary Artery: The estimated pulmonary artery systolic pressure is 11 mmHg.    IVC/SVC: Normal venous pressure at 3 mmHg.  CXR  There are postop changes of right upper lobectomy, with suture material overlying the right lung apex. There are surgical clips. The lungs are hypoexpanded. There are continued chronic coarse interstitial opacities throughout the bilateral lungs, stable across multiple prior studies. There is no significant detrimental change from prior. The pleural spaces are clear. The cardiac silhouette is borderline.     ESRD  Left arm AV graft  Last dialysis on Saturday  Metabolic bone disease  K 2.9  Replace  Anemia secondary to ESRD  Hb 9.9  Poor nutrition  Albumin 2.9  Renal diet as tolerated  Dialysis

## 2025-01-23 NOTE — ASSESSMENT & PLAN NOTE
Creatine stable for now. BMP reviewed- noted Estimated Creatinine Clearance: 11.5 mL/min (A) (based on SCr of 6.8 mg/dL (H)). according to latest data. Based on current GFR, CKD stage is end stage.  Monitor UOP and serial BMP and adjust therapy as needed. Renally dose meds. Avoid nephrotoxic medications and procedures.    Last HD 1/18, Saturday  Nephrology consulted for HD management

## 2025-01-23 NOTE — PROGRESS NOTES
01/23/25 1530   Vital Signs   Temp 97.9 °F (36.6 °C)   Temp Source Temporal   Pulse 65   Heart Rate Source Right;Brachial;NIBP   Resp 18   Device (Oxygen Therapy) room air   /79   BP Location Right arm   BP Method Automatic   Patient Position Lying   Post-Hemodialysis Assessment   Rinseback Volume (mL) 250 mL   Blood Volume Processed (Liters) 54 L   Dialyzer Clearance Clear   Duration of Treatment 180 minutes   Additional Fluid Intake (mL) 500 mL   Total UF (mL) 1500 mL   Net Fluid Removal 1000   Patient Response to Treatment well   Post-Hemodialysis Comments blood returned, lines flushed, needles pulled, manual pressure held until hemostasis achieved

## 2025-01-23 NOTE — SUBJECTIVE & OBJECTIVE
Past Medical History:   Diagnosis Date    Acute congestive heart failure 9/13/2024    Allergy     Anemia     Anticoagulant long-term use     Arthritis     CKD (chronic kidney disease) stage 4, GFR 15-29 ml/min     COPD (chronic obstructive pulmonary disease) 08/20/2021    Coronary artery disease     Heart attack 10/04/2019    Hematuria     Hemothorax     Hyperlipidemia     Hypertension     Hyperuricemia     Hypocalcemia     Hypokalemia     Hypophosphatemia     Hypothyroidism 3/2/2023    PAD (peripheral artery disease)     Proteinuria     Vitamin D deficiency        Past Surgical History:   Procedure Laterality Date    ABDOMINAL AORTOGRAPHY N/A 5/10/2019    Procedure: AORTOGRAM-ABDOMINAL;  Surgeon: Keo Jenkins MD;  Location: [facility] CATH LAB/EP;  Service: Cardiology;  Laterality: N/A;  RSFA intervention     AORTOGRAPHY WITH SERIALOGRAPHY N/A 12/3/2021    Procedure: AORTOGRAM, WITH SERIALOGRAPHY;  Surgeon: Keo Jenkins MD;  Location: [facility] CATH LAB/EP;  Service: Cardiology;  Laterality: N/A;    AORTOGRAPHY WITH SERIALOGRAPHY N/A 4/1/2022    Procedure: AORTOGRAM, WITH SERIALOGRAPHY;  Surgeon: Keo Jenkins MD;  Location: [facility] CATH LAB/EP;  Service: Cardiology;  Laterality: N/A;    ATHERECTOMY, CORONARY N/A 4/25/2023    Procedure: Atherectomy-coronary;  Surgeon: Keo Jenkins MD;  Location: [facility] CATH LAB/EP;  Service: Cardiology;  Laterality: N/A;    BONE MARROW BIOPSY Right 10/12/2021    Procedure: BIOPSY-BONE MARROW;  Surgeon: Naseem Woods MD;  Location: [facility] OR;  Service: Oncology;  Laterality: Right;    CARDIAC CATHETERIZATION      CATARACT EXTRACTION      CATARACT EXTRACTION W/  INTRAOCULAR LENS IMPLANT Right 6/8/2020    Procedure: EXTRACTION, CATARACT, WITH IOL INSERTION;  Surgeon: Tin Light MD;  Location: [facility] OR;  Service: Ophthalmology;  Laterality: Right;    CATARACT EXTRACTION W/  INTRAOCULAR LENS IMPLANT Left 7/2/2020    Procedure: EXTRACTION, CATARACT, WITH IOL INSERTION;  Surgeon: Tin TORRE  MD Nadege;  Location: [facility] OR;  Service: Ophthalmology;  Laterality: Left;    COLONOSCOPY N/A 1/6/2022    Procedure: COLONOSCOPY;  Surgeon: Kathe Penaloza MD;  Location: [facility]ARG[facility] (2ND FLR);  Service: Endoscopy;  Laterality: N/A;  COVID test at [facility] on 1/3-GT  okay to hold Plavix for 5 days and aspirin per Dr. Becerra  2nd floor due toextensive cardiac history   instructions mailed and my [facility] portal -     CORONARY ANGIOGRAPHY N/A 3/29/2019    Procedure: ANGIOGRAM, CORONARY ARTERY;  Surgeon: Keo Jenkins MD;  Location: [facility] CATH LAB/EP;  Service: Cardiology;  Laterality: N/A;    CORONARY ANGIOGRAPHY Left 10/11/2019    Procedure: ANGIOGRAM, CORONARY ARTERY;  Surgeon: Keo Jenkins MD;  Location: [facility] CATH LAB/EP;  Service: Cardiology;  Laterality: Left;    CORONARY ANGIOGRAPHY N/A 4/10/2023    Procedure: ANGIOGRAM, CORONARY ARTERY;  Surgeon: Keo Jenkins MD;  Location: [facility] CATH LAB/EP;  Service: Cardiology;  Laterality: N/A;    CORONARY ANGIOGRAPHY N/A 6/26/2024    Procedure: ANGIOGRAM, CORONARY ARTERY;  Surgeon: Enoch Tirado MD;  Location: [facility] CATH LAB/EP;  Service: Cardiology;  Laterality: N/A;    CORONARY ANGIOGRAPHY N/A 9/16/2024    Procedure: ANGIOGRAM, CORONARY ARTERY;  Surgeon: Enoch Tirado MD;  Location: [facility] CATH LAB/EP;  Service: Cardiology;  Laterality: N/A;    CORONARY ANGIOPLASTY WITH STENT PLACEMENT  03/29/2019    mid and distal RCA    ESOPHAGOGASTRODUODENOSCOPY N/A 1/6/2022    Procedure: EGD (ESOPHAGOGASTRODUODENOSCOPY);  Surgeon: Kathe Penaloza MD;  Location: [facility] (2ND FLR);  Service: Endoscopy;  Laterality: N/A;    EYE SURGERY      INSERTION OF TUNNELED CENTRAL VENOUS HEMODIALYSIS CATHETER N/A 2/9/2024    Procedure: INSERTION, CATHETER, HEMODIALYSIS, DUAL LUMEN;  Surgeon: Wang Mendieta MD;  Location: [facility] OR;  Service: General;  Laterality: N/A;    INSTANTANEOUS WAVE-FREE RATIO (IFR) N/A 4/25/2023    Procedure: Instantaneous Wave-Free Ratio (IFR);   Surgeon: Keo Jenkins MD;  Location: [facility] CATH LAB/EP;  Service: Cardiology;  Laterality: N/A;    INTRAVASCULAR ULTRASOUND, NON-CORONARY  8/12/2022    Procedure: Intravascular Ultrasound, Non-Coronary;  Surgeon: Keo Jenkins MD;  Location: [facility] CATH LAB/EP;  Service: Cardiology;;    IVUS, CORONARY  4/25/2023    Procedure: IVUS, Coronary;  Surgeon: Keo Jenkins MD;  Location: [facility] CATH LAB/EP;  Service: Cardiology;;    IVUS, CORONARY  5/16/2023    Procedure: IVUS, Coronary;  Surgeon: Keo Jenkins MD;  Location: [facility] CATH LAB/EP;  Service: Cardiology;;  CX    LEFT HEART CATHETERIZATION N/A 3/29/2019    Procedure: Left heart cath;  Surgeon: Keo Jenkins MD;  Location: [facility] CATH LAB/EP;  Service: Cardiology;  Laterality: N/A;    LEFT HEART CATHETERIZATION Left 10/8/2019    Procedure: Left heart cath;  Surgeon: Keo Jenkins MD;  Location: [facility] CATH LAB/EP;  Service: Cardiology;  Laterality: Left;    LEFT HEART CATHETERIZATION Left 4/10/2023    Procedure: Left heart cath;  Surgeon: Keo Jenkins MD;  Location: [facility] CATH LAB/EP;  Service: Cardiology;  Laterality: Left;    LEFT HEART CATHETERIZATION Left 4/25/2023    Procedure: Left heart cath;  Surgeon: Keo Jenkins MD;  Location: [facility] CATH LAB/EP;  Service: Cardiology;  Laterality: Left;    LEFT HEART CATHETERIZATION Left 5/16/2023    Procedure: Left heart cath;  Surgeon: Keo Jenkins MD;  Location: [facility] CATH LAB/EP;  Service: Cardiology;  Laterality: Left;    LEFT HEART CATHETERIZATION Left 6/26/2024    Procedure: Left heart cath;  Surgeon: Enoch Tirado MD;  Location: [facility] CATH LAB/EP;  Service: Cardiology;  Laterality: Left;    LEFT HEART CATHETERIZATION Left 9/16/2024    Procedure: Left heart cath;  Surgeon: Enoch Tirado MD;  Location: [facility] CATH LAB/EP;  Service: Cardiology;  Laterality: Left;    LITHOTRIPSY, CORONARY TRANSLUMINAL, PERCUTANEOUS  9/16/2024    Procedure: LITHOTRIPSY, CORONARY TRANSLUMINAL, PERCUTANEOUS;   Surgeon: Enoch Tirado MD;  Location: [facility] CATH LAB/EP;  Service: Cardiology;;    PERCUTANEOUS CORONARY INTERVENTION, ARTERY N/A 6/26/2024    Procedure: Percutaneous coronary intervention;  Surgeon: Enoch Tirado MD;  Location: [facility] CATH LAB/EP;  Service: Cardiology;  Laterality: N/A;    PERCUTANEOUS TRANSLUMINAL ANGIOPLASTY (PTA) OF PERIPHERAL VESSEL N/A 7/12/2019    Procedure: PTA, PERIPHERAL VESSEL;  Surgeon: Keo Jenkins MD;  Location: [facility] CATH LAB/EP;  Service: Cardiology;  Laterality: N/A;    PERCUTANEOUS TRANSLUMINAL ANGIOPLASTY (PTA) OF PERIPHERAL VESSEL Left 5/20/2022    Procedure: PTA, PERIPHERAL VESSEL;  Surgeon: Keo Jenkins MD;  Location: [facility] CATH LAB/EP;  Service: Cardiology;  Laterality: Left;    PERCUTANEOUS TRANSLUMINAL ANGIOPLASTY (PTA) OF PERIPHERAL VESSEL Right 8/12/2022    Procedure: PTA, PERIPHERAL VESSEL;  Surgeon: Keo Jenkins MD;  Location: [facility] CATH LAB/EP;  Service: Cardiology;  Laterality: Right;    PERCUTANEOUS TRANSLUMINAL BALLOON ANGIOPLASTY OF CORONARY ARTERY  4/25/2023    Procedure: Angioplasty-coronary;  Surgeon: Keo Jenkins MD;  Location: [facility] CATH LAB/EP;  Service: Cardiology;;    PLACEMENT OF ARTERIOVENOUS GRAFT Left 4/15/2024    Procedure: INSERTION, GRAFT, ARTERIOVENOUS;  Surgeon: Scott Pascual MD;  Location: [facility] OR;  Service: General;  Laterality: Left;    PTCA, SINGLE VESSEL  5/16/2023    Procedure: PTCA, Single Vessel;  Surgeon: Keo Jenkins MD;  Location: [facility] CATH LAB/EP;  Service: Cardiology;;  CX    PTCA, SINGLE VESSEL  6/26/2024    Procedure: PTCA, Single Vessel;  Surgeon: Enoch Tirado MD;  Location: [facility] CATH LAB/EP;  Service: Cardiology;;    PTCA, SINGLE VESSEL Left 9/16/2024    Procedure: PTCA, Single Vessel;  Surgeon: Enoch Tirado MD;  Location: [facility] CATH LAB/EP;  Service: Cardiology;  Laterality: Left;    REMOVAL OF HEMODIALYSIS CATHETER Right 2/9/2024    Procedure: REMOVAL, CATHETER, HEMODIALYSIS;   Surgeon: Wang Mendieta MD;  Location: [facility] OR;  Service: General;  Laterality: Right;    REMOVAL OF TUNNELED CENTRAL VENOUS CATHETER (CVC) Right 5/20/2024    Procedure: REMOVAL, CATHETER, CENTRAL VENOUS, TUNNELED;  Surgeon: Scott Pascual MD;  Location: [facility] OR;  Service: General;  Laterality: Right;    REPAIR, CHRONIC TOTAL OCCLUSION, CORONARY  6/26/2024    Procedure: Repair, Chronic Total Occlusion, Coronary;  Surgeon: Enoch Tirado MD;  Location: [facility] CATH LAB/EP;  Service: Cardiology;;    STENT, DRUG ELUTING, SINGLE VESSEL, CORONARY  4/25/2023    Procedure: Stent, Drug Eluting, Single Vessel, Coronary;  Surgeon: Keo Jenkins MD;  Location: [facility] CATH LAB/EP;  Service: Cardiology;;    STENT, DRUG ELUTING, SINGLE VESSEL, CORONARY  5/16/2023    Procedure: Stent, Drug Eluting, Single Vessel, Coronary;  Surgeon: Keo Jenkins MD;  Location: [facility] CATH LAB/EP;  Service: Cardiology;;  CX    TRANSESOPHAGEAL ECHOCARDIOGRAM WITH POSSIBLE CARDIOVERSION (JOSE W/ POSS CARDIOVERSION) N/A 6/19/2023    Procedure: Transesophageal echo (JOSE) intra-procedure log documentation;  Surgeon: Keo Jenkins MD;  Location: [facility] CATH LAB/EP;  Service: Cardiology;  Laterality: N/A;       Review of patient's allergies indicates:   Allergen Reactions    Ace inhibitors Rash       No current facility-administered medications on file prior to encounter.     Current Outpatient Medications on File Prior to Encounter   Medication Sig    albuterol-ipratropium (DUO-NEB) 2.5 mg-0.5 mg/3 mL nebulizer solution Use 1 vial by nebulization every 6 (six) hours as needed for Wheezing or Shortness of Breath (or cough). Rescue    amLODIPine (NORVASC) 5 MG tablet Take 1 tablet (5 mg total) by mouth once daily.    apixaban (ELIQUIS) 5 mg Tab Take 1 tablet (5 mg total) by mouth 2 (two) times daily.    carvediloL (COREG) 25 MG tablet Take 1 tablet (25 mg total) by mouth 2 (two) times daily with meals.    cephALEXin (KEFLEX) 250 MG capsule  Take 250 mg by mouth.    clopidogreL (PLAVIX) 75 mg tablet Take 1 tablet (75 mg total) by mouth once daily.    coenzyme Q10 100 mg capsule Take 1 capsule (100 mg total) by mouth once daily.    eplerenone (INSPRA) 50 MG Tab Take 1 tablet (50 mg total) by mouth once daily.    furosemide (LASIX) 80 MG tablet Take 1 tablet (80 mg total) by mouth every other day. Non dialysis days only   Monday, Wednesday, Friday ,     hydrALAZINE (APRESOLINE) 50 MG tablet Take 1 tablet (50 mg total) by mouth every 8 (eight) hours.    hydrOXYzine pamoate (VISTARIL) 25 MG Cap Take 1 capsule (25 mg total) by mouth nightly as needed (Sleep).    isosorbide mononitrate (IMDUR) 30 MG 24 hr tablet Take 1 tablet (30 mg total) by mouth once daily.    levothyroxine (SYNTHROID) 75 MCG tablet Take 1 tablet (75 mcg total) by mouth before breakfast.    nitroGLYCERIN (NITROSTAT) 0.4 MG SL tablet Place 1 tablet (0.4 mg total) under the tongue every 5 (five) minutes as needed for Chest pain (for a max of 3 tabs in 15 minutes).    ranolazine (RANEXA) 1,000 mg Tb12 Take 1 tablet (1,000 mg total) by mouth 2 (two) times daily.    rosuvastatin (CRESTOR) 40 MG Tab Take 1 tablet (40 mg total) by mouth every evening.     Family History       Problem Relation (Age of Onset)    Aneurysm Mother    Cancer Father    Diabetes Sister    Heart disease Mother    Hypertension Mother, Sister    No Known Problems Brother, Son          Tobacco Use    Smoking status: Former     Current packs/day: 0.00     Types: Cigarettes     Quit date: 1999     Years since quittin.7     Passive exposure: Never    Smokeless tobacco: Never   Substance and Sexual Activity    Alcohol use: No     Alcohol/week: 0.0 standard drinks of alcohol    Drug use: No    Sexual activity: Yes     Partners: Female     Review of Systems   Cardiovascular:  Negative for chest pain (Improved with nitro).   All other systems reviewed and are negative.    Objective:     Vital Signs (Most  Recent):  Temp: 99 °F (37.2 °C) (01/22/25 2031)  Pulse: 61 (01/23/25 0433)  Resp: 15 (01/23/25 0433)  BP: (!) 109/54 (01/23/25 0433)  SpO2: 96 % (01/23/25 0433) Vital Signs (24h Range):  Temp:  [99 °F (37.2 °C)] 99 °F (37.2 °C)  Pulse:  [] 61  Resp:  [15-24] 15  SpO2:  [94 %-99 %] 96 %  BP: ()/(54-70) 109/54     Weight: 81.6 kg (180 lb)  Body mass index is 25.83 kg/m².     Physical Exam  Vitals reviewed.   Constitutional:       Appearance: He is ill-appearing.   HENT:      Head: Normocephalic.      Mouth/Throat:      Mouth: Mucous membranes are moist.      Pharynx: Oropharynx is clear.   Eyes:      Pupils: Pupils are equal, round, and reactive to light.   Cardiovascular:      Rate and Rhythm: Normal rate and regular rhythm.   Pulmonary:      Effort: Pulmonary effort is normal.      Breath sounds: Normal breath sounds.   Abdominal:      General: Bowel sounds are normal.      Palpations: Abdomen is soft.   Musculoskeletal:         General: Normal range of motion.      Cervical back: Normal range of motion.   Skin:     General: Skin is warm and dry.      Capillary Refill: Capillary refill takes less than 2 seconds.   Neurological:      General: No focal deficit present.      Mental Status: He is alert and oriented to person, place, and time. Mental status is at baseline.   Psychiatric:         Mood and Affect: Mood normal.         Behavior: Behavior normal.              CRANIAL NERVES     CN III, IV, VI   Pupils are equal, round, and reactive to light.       Significant Labs: All pertinent labs within the past 24 hours have been reviewed.  Recent Lab Results         01/23/25  0356   01/23/25  0006   01/22/25  2259   01/22/25  2030        Albumin 2.9       3.1       ALP 67       70       ALT 6       5       Anion Gap 15       20       AST 9       12       Baso # 0.03       0.03       Basophil % 0.4       0.3       BILIRUBIN TOTAL 0.7  Comment: For infants and newborns, interpretation of results should be  based  on gestational age, weight and in agreement with clinical  observations.    Premature Infant recommended reference ranges:  Up to 24 hours.............<8.0 mg/dL  Up to 48 hours............<12.0 mg/dL  3-5 days..................<15.0 mg/dL  6-29 days.................<15.0 mg/dL         0.6  Comment: For infants and newborns, interpretation of results should be based  on gestational age, weight and in agreement with clinical  observations.    Premature Infant recommended reference ranges:  Up to 24 hours.............<8.0 mg/dL  Up to 48 hours............<12.0 mg/dL  3-5 days..................<15.0 mg/dL  6-29 days.................<15.0 mg/dL         BUN 77       73       Calcium 7.6       7.4       Chloride 99       98       Cholesterol Total 125  Comment: The National Cholesterol Education Program (NCEP) has set the  following guidelines (reference ranges) for Cholesterol:  Optimal.....................<200 mg/dL  Borderline High.............200-239 mg/dL  High........................> or = 240 mg/dL               CO2 22       19       Creatinine 6.8       6.8       Differential Method Automated       Automated       eGFR 8       8       Eos # 0.1       0.1       Eos % 1.5       0.6       Glucose 94       101       Gran # (ANC) 4.9       7.8       Gran % 66.3       82.6       HDL 50  Comment: The National Cholesterol Education Program (NCEP) has set the  following guidelines (reference values) for HDL Cholesterol:  Low...............<40 mg/dL  Optimal...........>60 mg/dL               HDL/Cholesterol Ratio 40.0             Hematocrit 30.2       32.4       Hemoglobin 9.9       10.5       Immature Grans (Abs) 0.02  Comment: Mild elevation in immature granulocytes is non specific and   can be seen in a variety of conditions including stress response,   acute inflammation, trauma and pregnancy. Correlation with other   laboratory and clinical findings is essential.         0.03  Comment: Mild elevation in immature  granulocytes is non specific and   can be seen in a variety of conditions including stress response,   acute inflammation, trauma and pregnancy. Correlation with other   laboratory and clinical findings is essential.         Immature Granulocytes 0.3       0.3       LDL Cholesterol 59.8  Comment: The National Cholesterol Education Program (NCEP) has set the  following guidelines (reference values) for LDL Cholesterol:  Optimal.......................<130 mg/dL  Borderline High...............130-159 mg/dL  High..........................160-189 mg/dL  Very High.....................>190 mg/dL               Lymph # 1.3       0.5       Lymph % 16.8       5.3       Magnesium  1.9             MCH 26.9       26.6       MCHC 32.8       32.4       MCV 82       82       Mono # 1.1       1.0       Mono % 14.7       10.9       MPV 9.8       9.8       Non-HDL Cholesterol 75  Comment: Risk category and Non-HDL cholesterol goals:  Coronary heart disease (CHD)or equivalent (10-year risk of CHD >20%):  Non-HDL cholesterol goal     <130 mg/dL  Two or more CHD risk factors and 10-year risk of CHD <= 20%:  Non-HDL cholesterol goal     <160 mg/dL  0 to 1 CHD risk factor:  Non-HDL cholesterol goal     <190 mg/dL               nRBC 0       0       Platelet Count 137       155       Potassium 2.9       2.8       PROTEIN TOTAL 6.6       7.0       RBC 3.68       3.94       RDW 15.1       15.2       Sodium 136       137       Total Cholesterol/HDL Ratio 2.5             Triglycerides 76  Comment: The National Cholesterol Education Program (NCEP) has set the  following guidelines (reference values) for triglycerides:  Normal......................<150 mg/dL  Borderline High.............150-199 mg/dL  High........................200-499 mg/dL               Troponin I 0.080  Comment: The reference interval for Troponin I represents the 99th percentile   cutoff   for our facility and is consistent with 3rd generation assay   performance.      0.081  Comment: The reference interval for Troponin I represents the 99th percentile   cutoff   for our facility and is consistent with 3rd generation assay   performance.     0.074  Comment: The reference interval for Troponin I represents the 99th percentile   cutoff   for our facility and is consistent with 3rd generation assay   performance.     0.054  Comment: The reference interval for Troponin I represents the 99th percentile   cutoff   for our facility and is consistent with 3rd generation assay   performance.         WBC 7.44       9.40               Significant Imaging: I have reviewed all pertinent imaging results/findings within the past 24 hours.  I have reviewed and interpreted all pertinent imaging results/findings within the past 24 hours.

## 2025-01-23 NOTE — ASSESSMENT & PLAN NOTE
"Physical Therapy Visit    Visit Type: Daily Treatment Note  Visit: 11  Referring Provider: MARYANN Turcios  Medical Diagnosis (from order): M53.3, G89.29 - Chronic SI joint pain  M25.571, G89.29, M25.572 - Chronic pain of both ankles  M79.601, M79.602 - Bilateral arm pain  M25.519 - Shoulder pain, unspecified chronicity, unspecified laterality     SUBJECTIVE                                                                                                               Patient reports she is feeling better overall. She states last treatment really broke everything open. Right leg feels a little ""wonky. \"" Feels like her right leg is not in correctly aligned. Her left shoulder is doing really good. She notices improved rotational movement. She is feeling some restrictions along her left ribcage. OBJECTIVE                                                                                                                                     Treatment     Manual Therapy   Fascial counterstrain to hip/thigh/knee/lower leg and depressed rib periosteum dysfunctions. Skilled input: verbal instruction/cues and tactile instruction/cues    Writer verbally educated and received verbal consent for hand placement, positioning of patient, and techniques to be performed today from patient for therapist position for techniques, hand placement and palpation for techniques and clothing adjustments for techniques as described above and how they are pertinent to the patient's plan of care. Home Exercise Program  No home exercise program at this time. ASSESSMENT                                                                                                            Patient continues to respond really nicely to fascial counterstrain techniques. Significant fascial releases noted along right lower extremity with periosteum techniques. Patient would benefit from neuro system being evaluated next session.      PLAN              " Patient's blood pressure range in the last 24 hours was: BP  Min: 99/58  Max: 142/70.The patient's inpatient anti-hypertensive regimen is listed below:  Current Antihypertensives  amLODIPine tablet 5 mg, Daily, Oral  carvediloL tablet 25 mg, 2 times daily with meals, Oral  spironolactone tablet 25 mg, Daily, Oral  hydrALAZINE tablet 50 mg, Every 8 hours, Oral  isosorbide mononitrate 24 hr tablet 30 mg, Daily, Oral  nitroGLYCERIN SL tablet 0.4 mg, Every 5 min PRN, Sublingual  ranolazine 12 hr tablet 1,000 mg, 2 times daily, Oral    Plan  - BP is controlled, no changes needed to their regimen  - continue home meds   Suggestions for next session as indicated: Progress per plan of care. Continue with fascial counterstrain focusing on neuro system (specifically for left upper thoracic region).          Therapy procedure time and total treatment time can be found documented on the Time Entry flowsheet

## 2025-01-23 NOTE — ASSESSMENT & PLAN NOTE
Patient's most recent potassium results are listed below.   Recent Labs     01/22/25  2030 01/23/25  0356   K 2.8* 2.9*     Plan  - Replete potassium per protocol  - Monitor potassium Daily  - Patient's hypokalemia is worsening. Will continue current treatment  - continue Aldactone  -hold Lasix  -nephrology to adjust K bath

## 2025-01-23 NOTE — H&P
[place] - Emergency Dept  [facility] Medicine  History & Physical    Patient Name: Domingo Gross  MRN: [id number]  Patient Class: OP- Observation  Admission Date: 1/22/2025  Attending Physician: Pawan Garcia,*   Primary Care Provider: Perez Bell DO         Patient information was obtained from patient and ER records.     Subjective:     Principal Problem:<principal problem not specified>    Chief Complaint:   Chief Complaint   Patient presents with    Chest Pain     [id number] brought in a 63 y/o male from home with c/o chest pain for a couple of hours, URI symptoms for 2 days.  Right AC 18g inserted per EMS.  Accu check 154.Hx of stents, MI's, HTN, Dialysis, ESRD        HPI: Pt is a 65yo Male with PMHx of ESRD on HD TTS, CAD w stents, presenting to ED with left substernal non-radiating Chest pain which started in the afternoon and did not subside.  In ED chest pain waxing and waning.  Patient was given aspirin and nitroglycerin which did improve patient's pain.  Labwork remarkable for stable anemia, chemistry shows hypokalemia at 2.8 which was replaced and labs consistent with ESRD.  Last HD was Saturday 1/18.  Troponin mildly elevated at 0.054.  EKG without ST elevation.  Chest x-ray was stable chronic changes.  Patient with a heart score of 6.  He will be admitted to Hospital Medicine with Cardiology eval for ACS rule out.  Nephrology consulted for HD management    Past Medical History:   Diagnosis Date    Acute congestive heart failure 9/13/2024    Allergy     Anemia     Anticoagulant long-term use     Arthritis     CKD (chronic kidney disease) stage 4, GFR 15-29 ml/min     COPD (chronic obstructive pulmonary disease) 08/20/2021    Coronary artery disease     Heart attack 10/04/2019    Hematuria     Hemothorax     Hyperlipidemia     Hypertension     Hyperuricemia     Hypocalcemia     Hypokalemia     Hypophosphatemia     Hypothyroidism 3/2/2023    PAD (peripheral artery disease)     Proteinuria      Vitamin D deficiency        Past Surgical History:   Procedure Laterality Date    ABDOMINAL AORTOGRAPHY N/A 5/10/2019    Procedure: AORTOGRAM-ABDOMINAL;  Surgeon: Keo Jenkins MD;  Location: [facility] CATH LAB/EP;  Service: Cardiology;  Laterality: N/A;  RSFA intervention     AORTOGRAPHY WITH SERIALOGRAPHY N/A 12/3/2021    Procedure: AORTOGRAM, WITH SERIALOGRAPHY;  Surgeon: Keo Jenkins MD;  Location: [facility] CATH LAB/EP;  Service: Cardiology;  Laterality: N/A;    AORTOGRAPHY WITH SERIALOGRAPHY N/A 4/1/2022    Procedure: AORTOGRAM, WITH SERIALOGRAPHY;  Surgeon: Keo Jenkins MD;  Location: [facility] CATH LAB/EP;  Service: Cardiology;  Laterality: N/A;    ATHERECTOMY, CORONARY N/A 4/25/2023    Procedure: Atherectomy-coronary;  Surgeon: Keo Jenkins MD;  Location: [facility] CATH LAB/EP;  Service: Cardiology;  Laterality: N/A;    BONE MARROW BIOPSY Right 10/12/2021    Procedure: BIOPSY-BONE MARROW;  Surgeon: Naseem Woods MD;  Location: [facility] OR;  Service: Oncology;  Laterality: Right;    CARDIAC CATHETERIZATION      CATARACT EXTRACTION      CATARACT EXTRACTION W/  INTRAOCULAR LENS IMPLANT Right 6/8/2020    Procedure: EXTRACTION, CATARACT, WITH IOL INSERTION;  Surgeon: Tin Light MD;  Location: [facility] OR;  Service: Ophthalmology;  Laterality: Right;    CATARACT EXTRACTION W/  INTRAOCULAR LENS IMPLANT Left 7/2/2020    Procedure: EXTRACTION, CATARACT, WITH IOL INSERTION;  Surgeon: Tin Light MD;  Location: [facility] OR;  Service: Ophthalmology;  Laterality: Left;    COLONOSCOPY N/A 1/6/2022    Procedure: COLONOSCOPY;  Surgeon: Kathe Penaloza MD;  Location: [facility] ENDO (2ND FLR);  Service: Endoscopy;  Laterality: N/A;  COVID test at [facility] on 1/3-GT  okay to hold Plavix for 5 days and aspirin per Dr. Becerra  2nd floor due toextensive cardiac history   instructions mailed and my [facility] portal -     CORONARY ANGIOGRAPHY N/A 3/29/2019    Procedure: ANGIOGRAM, CORONARY ARTERY;  Surgeon: Keo Jenkins MD;  Location: [facility]  CATH LAB/EP;  Service: Cardiology;  Laterality: N/A;    CORONARY ANGIOGRAPHY Left 10/11/2019    Procedure: ANGIOGRAM, CORONARY ARTERY;  Surgeon: Keo Jenkins MD;  Location: [facility] CATH LAB/EP;  Service: Cardiology;  Laterality: Left;    CORONARY ANGIOGRAPHY N/A 4/10/2023    Procedure: ANGIOGRAM, CORONARY ARTERY;  Surgeon: Keo Jenkins MD;  Location: [facility] CATH LAB/EP;  Service: Cardiology;  Laterality: N/A;    CORONARY ANGIOGRAPHY N/A 6/26/2024    Procedure: ANGIOGRAM, CORONARY ARTERY;  Surgeon: Enoch Tirado MD;  Location: [facility] CATH LAB/EP;  Service: Cardiology;  Laterality: N/A;    CORONARY ANGIOGRAPHY N/A 9/16/2024    Procedure: ANGIOGRAM, CORONARY ARTERY;  Surgeon: Enoch Tirado MD;  Location: [facility] CATH LAB/EP;  Service: Cardiology;  Laterality: N/A;    CORONARY ANGIOPLASTY WITH STENT PLACEMENT  03/29/2019    mid and distal RCA    ESOPHAGOGASTRODUODENOSCOPY N/A 1/6/2022    Procedure: EGD (ESOPHAGOGASTRODUODENOSCOPY);  Surgeon: Kathe Penaloza MD;  Location: [address]);  Service: Endoscopy;  Laterality: N/A;    EYE SURGERY      INSERTION OF TUNNELED CENTRAL VENOUS HEMODIALYSIS CATHETER N/A 2/9/2024    Procedure: INSERTION, CATHETER, HEMODIALYSIS, DUAL LUMEN;  Surgeon: Wang Mendieta MD;  Location: [facility] OR;  Service: General;  Laterality: N/A;    INSTANTANEOUS WAVE-FREE RATIO (IFR) N/A 4/25/2023    Procedure: Instantaneous Wave-Free Ratio (IFR);  Surgeon: Keo Jenkins MD;  Location: [facility] CATH LAB/EP;  Service: Cardiology;  Laterality: N/A;    INTRAVASCULAR ULTRASOUND, NON-CORONARY  8/12/2022    Procedure: Intravascular Ultrasound, Non-Coronary;  Surgeon: Keo Jenkins MD;  Location: [facility] CATH LAB/EP;  Service: Cardiology;;    IVUS, CORONARY  4/25/2023    Procedure: IVUS, Coronary;  Surgeon: Keo Jenkins MD;  Location: [facility] CATH LAB/EP;  Service: Cardiology;;    IVUS, CORONARY  5/16/2023    Procedure: IVUS, Coronary;  Surgeon: Keo Jenkins MD;  Location: [facility] CATH LAB/EP;   Service: Cardiology;;  CX    LEFT HEART CATHETERIZATION N/A 3/29/2019    Procedure: Left heart cath;  Surgeon: Keo Jenkins MD;  Location: [facility] CATH LAB/EP;  Service: Cardiology;  Laterality: N/A;    LEFT HEART CATHETERIZATION Left 10/8/2019    Procedure: Left heart cath;  Surgeon: Keo Jenkins MD;  Location: [facility] CATH LAB/EP;  Service: Cardiology;  Laterality: Left;    LEFT HEART CATHETERIZATION Left 4/10/2023    Procedure: Left heart cath;  Surgeon: Keo Jenkins MD;  Location: [facility] CATH LAB/EP;  Service: Cardiology;  Laterality: Left;    LEFT HEART CATHETERIZATION Left 4/25/2023    Procedure: Left heart cath;  Surgeon: Keo Jenkins MD;  Location: [facility] CATH LAB/EP;  Service: Cardiology;  Laterality: Left;    LEFT HEART CATHETERIZATION Left 5/16/2023    Procedure: Left heart cath;  Surgeon: Keo Jenkins MD;  Location: [facility] CATH LAB/EP;  Service: Cardiology;  Laterality: Left;    LEFT HEART CATHETERIZATION Left 6/26/2024    Procedure: Left heart cath;  Surgeon: Enoch Tirado MD;  Location: [facility] CATH LAB/EP;  Service: Cardiology;  Laterality: Left;    LEFT HEART CATHETERIZATION Left 9/16/2024    Procedure: Left heart cath;  Surgeon: Enoch Tirado MD;  Location: [facility] CATH LAB/EP;  Service: Cardiology;  Laterality: Left;    LITHOTRIPSY, CORONARY TRANSLUMINAL, PERCUTANEOUS  9/16/2024    Procedure: LITHOTRIPSY, CORONARY TRANSLUMINAL, PERCUTANEOUS;  Surgeon: Enoch Tirado MD;  Location: [facility] CATH LAB/EP;  Service: Cardiology;;    PERCUTANEOUS CORONARY INTERVENTION, ARTERY N/A 6/26/2024    Procedure: Percutaneous coronary intervention;  Surgeon: Enoch Tirado MD;  Location: [facility] CATH LAB/EP;  Service: Cardiology;  Laterality: N/A;    PERCUTANEOUS TRANSLUMINAL ANGIOPLASTY (PTA) OF PERIPHERAL VESSEL N/A 7/12/2019    Procedure: PTA, PERIPHERAL VESSEL;  Surgeon: Keo Jenkins MD;  Location: [facility] CATH LAB/EP;  Service: Cardiology;  Laterality: N/A;    PERCUTANEOUS TRANSLUMINAL  ANGIOPLASTY (PTA) OF PERIPHERAL VESSEL Left 5/20/2022    Procedure: PTA, PERIPHERAL VESSEL;  Surgeon: Keo eJnkins MD;  Location: [facility] CATH LAB/EP;  Service: Cardiology;  Laterality: Left;    PERCUTANEOUS TRANSLUMINAL ANGIOPLASTY (PTA) OF PERIPHERAL VESSEL Right 8/12/2022    Procedure: PTA, PERIPHERAL VESSEL;  Surgeon: Keo Jenkins MD;  Location: [facility] CATH LAB/EP;  Service: Cardiology;  Laterality: Right;    PERCUTANEOUS TRANSLUMINAL BALLOON ANGIOPLASTY OF CORONARY ARTERY  4/25/2023    Procedure: Angioplasty-coronary;  Surgeon: Keo Jenkins MD;  Location: [facility] CATH LAB/EP;  Service: Cardiology;;    PLACEMENT OF ARTERIOVENOUS GRAFT Left 4/15/2024    Procedure: INSERTION, GRAFT, ARTERIOVENOUS;  Surgeon: Scott Pascual MD;  Location: [facility] OR;  Service: General;  Laterality: Left;    PTCA, SINGLE VESSEL  5/16/2023    Procedure: PTCA, Single Vessel;  Surgeon: Keo Jenkins MD;  Location: [facility] CATH LAB/EP;  Service: Cardiology;;  CX    PTCA, SINGLE VESSEL  6/26/2024    Procedure: PTCA, Single Vessel;  Surgeon: Enoch Tirado MD;  Location: [facility] CATH LAB/EP;  Service: Cardiology;;    PTCA, SINGLE VESSEL Left 9/16/2024    Procedure: PTCA, Single Vessel;  Surgeon: Enoch Tirado MD;  Location: [facility] CATH LAB/EP;  Service: Cardiology;  Laterality: Left;    REMOVAL OF HEMODIALYSIS CATHETER Right 2/9/2024    Procedure: REMOVAL, CATHETER, HEMODIALYSIS;  Surgeon: Wang Mendieta MD;  Location: [facility] OR;  Service: General;  Laterality: Right;    REMOVAL OF TUNNELED CENTRAL VENOUS CATHETER (CVC) Right 5/20/2024    Procedure: REMOVAL, CATHETER, CENTRAL VENOUS, TUNNELED;  Surgeon: Scott Pascual MD;  Location: [facility] OR;  Service: General;  Laterality: Right;    REPAIR, CHRONIC TOTAL OCCLUSION, CORONARY  6/26/2024    Procedure: Repair, Chronic Total Occlusion, Coronary;  Surgeon: Enoch Tirado MD;  Location: [facility] CATH LAB/EP;  Service: Cardiology;;    STENT, DRUG ELUTING, SINGLE VESSEL,  CORONARY  4/25/2023    Procedure: Stent, Drug Eluting, Single Vessel, Coronary;  Surgeon: eKo Jenkins MD;  Location: [facility] CATH LAB/EP;  Service: Cardiology;;    STENT, DRUG ELUTING, SINGLE VESSEL, CORONARY  5/16/2023    Procedure: Stent, Drug Eluting, Single Vessel, Coronary;  Surgeon: Keo Jenkins MD;  Location: [facility] CATH LAB/EP;  Service: Cardiology;;  CX    TRANSESOPHAGEAL ECHOCARDIOGRAM WITH POSSIBLE CARDIOVERSION (JOSE W/ POSS CARDIOVERSION) N/A 6/19/2023    Procedure: Transesophageal echo (JOSE) intra-procedure log documentation;  Surgeon: Keo Jenkins MD;  Location: [facility] CATH LAB/EP;  Service: Cardiology;  Laterality: N/A;       Review of patient's allergies indicates:   Allergen Reactions    Ace inhibitors Rash       No current facility-administered medications on file prior to encounter.     Current Outpatient Medications on File Prior to Encounter   Medication Sig    albuterol-ipratropium (DUO-NEB) 2.5 mg-0.5 mg/3 mL nebulizer solution Use 1 vial by nebulization every 6 (six) hours as needed for Wheezing or Shortness of Breath (or cough). Rescue    amLODIPine (NORVASC) 5 MG tablet Take 1 tablet (5 mg total) by mouth once daily.    apixaban (ELIQUIS) 5 mg Tab Take 1 tablet (5 mg total) by mouth 2 (two) times daily.    carvediloL (COREG) 25 MG tablet Take 1 tablet (25 mg total) by mouth 2 (two) times daily with meals.    cephALEXin (KEFLEX) 250 MG capsule Take 250 mg by mouth.    clopidogreL (PLAVIX) 75 mg tablet Take 1 tablet (75 mg total) by mouth once daily.    coenzyme Q10 100 mg capsule Take 1 capsule (100 mg total) by mouth once daily.    eplerenone (INSPRA) 50 MG Tab Take 1 tablet (50 mg total) by mouth once daily.    furosemide (LASIX) 80 MG tablet Take 1 tablet (80 mg total) by mouth every other day. Non dialysis days only   Monday, Wednesday, Friday , Sunday    hydrALAZINE (APRESOLINE) 50 MG tablet Take 1 tablet (50 mg total) by mouth every 8 (eight) hours.    hydrOXYzine pamoate (VISTARIL)  25 MG Cap Take 1 capsule (25 mg total) by mouth nightly as needed (Sleep).    isosorbide mononitrate (IMDUR) 30 MG 24 hr tablet Take 1 tablet (30 mg total) by mouth once daily.    levothyroxine (SYNTHROID) 75 MCG tablet Take 1 tablet (75 mcg total) by mouth before breakfast.    nitroGLYCERIN (NITROSTAT) 0.4 MG SL tablet Place 1 tablet (0.4 mg total) under the tongue every 5 (five) minutes as needed for Chest pain (for a max of 3 tabs in 15 minutes).    ranolazine (RANEXA) 1,000 mg Tb12 Take 1 tablet (1,000 mg total) by mouth 2 (two) times daily.    rosuvastatin (CRESTOR) 40 MG Tab Take 1 tablet (40 mg total) by mouth every evening.     Family History       Problem Relation (Age of Onset)    Aneurysm Mother    Cancer Father    Diabetes Sister    Heart disease Mother    Hypertension Mother, Sister    No Known Problems Brother, Son          Tobacco Use    Smoking status: Former     Current packs/day: 0.00     Types: Cigarettes     Quit date: 1999     Years since quittin.7     Passive exposure: Never    Smokeless tobacco: Never   Substance and Sexual Activity    Alcohol use: No     Alcohol/week: 0.0 standard drinks of alcohol    Drug use: No    Sexual activity: Yes     Partners: Female     Review of Systems   Cardiovascular:  Negative for chest pain (Improved with nitro).   All other systems reviewed and are negative.    Objective:     Vital Signs (Most Recent):  Temp: 99 °F (37.2 °C) (25)  Pulse: 61 (25)  Resp: 15 (25)  BP: (!) 109/54 (25)  SpO2: 96 % (25) Vital Signs (24h Range):  Temp:  [99 °F (37.2 °C)] 99 °F (37.2 °C)  Pulse:  [] 61  Resp:  [15-24] 15  SpO2:  [94 %-99 %] 96 %  BP: ()/(54-70) 109/54     Weight: 81.6 kg (180 lb)  Body mass index is 25.83 kg/m².     Physical Exam  Vitals reviewed.   Constitutional:       Appearance: He is ill-appearing.   HENT:      Head: Normocephalic.      Mouth/Throat:      Mouth: Mucous membranes are  moist.      Pharynx: Oropharynx is clear.   Eyes:      Pupils: Pupils are equal, round, and reactive to light.   Cardiovascular:      Rate and Rhythm: Normal rate and regular rhythm.   Pulmonary:      Effort: Pulmonary effort is normal.      Breath sounds: Normal breath sounds.   Abdominal:      General: Bowel sounds are normal.      Palpations: Abdomen is soft.   Musculoskeletal:         General: Normal range of motion.      Cervical back: Normal range of motion.   Skin:     General: Skin is warm and dry.      Capillary Refill: Capillary refill takes less than 2 seconds.   Neurological:      General: No focal deficit present.      Mental Status: He is alert and oriented to person, place, and time. Mental status is at baseline.   Psychiatric:         Mood and Affect: Mood normal.         Behavior: Behavior normal.              CRANIAL NERVES     CN III, IV, VI   Pupils are equal, round, and reactive to light.       Significant Labs: All pertinent labs within the past 24 hours have been reviewed.  Recent Lab Results         01/23/25  0356   01/23/25  0006   01/22/25  2259   01/22/25  2030        Albumin 2.9       3.1       ALP 67       70       ALT 6       5       Anion Gap 15       20       AST 9       12       Baso # 0.03       0.03       Basophil % 0.4       0.3       BILIRUBIN TOTAL 0.7  Comment: For infants and newborns, interpretation of results should be based  on gestational age, weight and in agreement with clinical  observations.    Premature Infant recommended reference ranges:  Up to 24 hours.............<8.0 mg/dL  Up to 48 hours............<12.0 mg/dL  3-5 days..................<15.0 mg/dL  6-29 days.................<15.0 mg/dL         0.6  Comment: For infants and newborns, interpretation of results should be based  on gestational age, weight and in agreement with clinical  observations.    Premature Infant recommended reference ranges:  Up to 24 hours.............<8.0 mg/dL  Up to 48  hours............<12.0 mg/dL  3-5 days..................<15.0 mg/dL  6-29 days.................<15.0 mg/dL         BUN 77       73       Calcium 7.6       7.4       Chloride 99       98       Cholesterol Total 125  Comment: The National Cholesterol Education Program (NCEP) has set the  following guidelines (reference ranges) for Cholesterol:  Optimal.....................<200 mg/dL  Borderline High.............200-239 mg/dL  High........................> or = 240 mg/dL               CO2 22       19       Creatinine 6.8       6.8       Differential Method Automated       Automated       eGFR 8       8       Eos # 0.1       0.1       Eos % 1.5       0.6       Glucose 94       101       Gran # (ANC) 4.9       7.8       Gran % 66.3       82.6       HDL 50  Comment: The National Cholesterol Education Program (NCEP) has set the  following guidelines (reference values) for HDL Cholesterol:  Low...............<40 mg/dL  Optimal...........>60 mg/dL               HDL/Cholesterol Ratio 40.0             Hematocrit 30.2       32.4       Hemoglobin 9.9       10.5       Immature Grans (Abs) 0.02  Comment: Mild elevation in immature granulocytes is non specific and   can be seen in a variety of conditions including stress response,   acute inflammation, trauma and pregnancy. Correlation with other   laboratory and clinical findings is essential.         0.03  Comment: Mild elevation in immature granulocytes is non specific and   can be seen in a variety of conditions including stress response,   acute inflammation, trauma and pregnancy. Correlation with other   laboratory and clinical findings is essential.         Immature Granulocytes 0.3       0.3       LDL Cholesterol 59.8  Comment: The National Cholesterol Education Program (NCEP) has set the  following guidelines (reference values) for LDL Cholesterol:  Optimal.......................<130 mg/dL  Borderline High...............130-159 mg/dL  High..........................160-189  mg/dL  Very High.....................>190 mg/dL               Lymph # 1.3       0.5       Lymph % 16.8       5.3       Magnesium  1.9             MCH 26.9       26.6       MCHC 32.8       32.4       MCV 82       82       Mono # 1.1       1.0       Mono % 14.7       10.9       MPV 9.8       9.8       Non-HDL Cholesterol 75  Comment: Risk category and Non-HDL cholesterol goals:  Coronary heart disease (CHD)or equivalent (10-year risk of CHD >20%):  Non-HDL cholesterol goal     <130 mg/dL  Two or more CHD risk factors and 10-year risk of CHD <= 20%:  Non-HDL cholesterol goal     <160 mg/dL  0 to 1 CHD risk factor:  Non-HDL cholesterol goal     <190 mg/dL               nRBC 0       0       Platelet Count 137       155       Potassium 2.9       2.8       PROTEIN TOTAL 6.6       7.0       RBC 3.68       3.94       RDW 15.1       15.2       Sodium 136       137       Total Cholesterol/HDL Ratio 2.5             Triglycerides 76  Comment: The National Cholesterol Education Program (NCEP) has set the  following guidelines (reference values) for triglycerides:  Normal......................<150 mg/dL  Borderline High.............150-199 mg/dL  High........................200-499 mg/dL               Troponin I 0.080  Comment: The reference interval for Troponin I represents the 99th percentile   cutoff   for our facility and is consistent with 3rd generation assay   performance.     0.081  Comment: The reference interval for Troponin I represents the 99th percentile   cutoff   for our facility and is consistent with 3rd generation assay   performance.     0.074  Comment: The reference interval for Troponin I represents the 99th percentile   cutoff   for our facility and is consistent with 3rd generation assay   performance.     0.054  Comment: The reference interval for Troponin I represents the 99th percentile   cutoff   for our facility and is consistent with 3rd generation assay   performance.         WBC 7.44       9.40                Significant Imaging: I have reviewed all pertinent imaging results/findings within the past 24 hours.  I have reviewed and interpreted all pertinent imaging results/findings within the past 24 hours.  Assessment/Plan:     Hypokalemia  Patient's most recent potassium results are listed below.   Recent Labs     01/22/25 2030 01/23/25  0356   K 2.8* 2.9*     Plan  - Replete potassium per protocol  - Monitor potassium Daily  - Patient's hypokalemia is worsening. Will continue current treatment  - continue Aldactone  -hold Lasix  -nephrology to adjust K bath        CAD (coronary artery disease)  Patient with known CAD s/p stent placement, which is uncontrolled Will continue ASA, Plavix, and Statin and monitor for S/Sx of angina/ACS. Continue to monitor on telemetry.     Patient with recent PTCA in September of 2024, all stents patent at time of procedure  Troponin elevated at 0.054, also with ESRD  EKG without ST elevation  Trend troponin    Given aspirin and nitro in ED  Consult Cardiology  Keep NPO  Hold Eliquis until Cardiology eval  Continue statin and Plavix      ESRD (end stage renal disease) on dialysis  Creatine stable for now. BMP reviewed- noted Estimated Creatinine Clearance: 11.5 mL/min (A) (based on SCr of 6.8 mg/dL (H)). according to latest data. Based on current GFR, CKD stage is end stage.  Monitor UOP and serial BMP and adjust therapy as needed. Renally dose meds. Avoid nephrotoxic medications and procedures.    Last HD 1/18, Saturday  Nephrology consulted for HD management      Paroxysmal atrial fibrillation  Patient has paroxysmal (<7 days) atrial fibrillation. Patient is currently in sinus rhythm. [id number] Score: 1. The patients heart rate in the last 24 hours is as follows:  Pulse  Min: 60  Max: 100     Antiarrhythmics       Anticoagulants       Plan  - Replete lytes with a goal of K>4, Mg >2  - Patient is anticoagulated, see medications listed above.  - Patient's afib is currently  controlled  - hold Eliquis until Cardiology eval          Hypothyroidism  Resume Synthroid      HTN (hypertension)  Patient's blood pressure range in the last 24 hours was: BP  Min: 99/58  Max: 142/70.The patient's inpatient anti-hypertensive regimen is listed below:  Current Antihypertensives  amLODIPine tablet 5 mg, Daily, Oral  carvediloL tablet 25 mg, 2 times daily with meals, Oral  spironolactone tablet 25 mg, Daily, Oral  hydrALAZINE tablet 50 mg, Every 8 hours, Oral  isosorbide mononitrate 24 hr tablet 30 mg, Daily, Oral  nitroGLYCERIN SL tablet 0.4 mg, Every 5 min PRN, Sublingual  ranolazine 12 hr tablet 1,000 mg, 2 times daily, Oral    Plan  - BP is controlled, no changes needed to their regimen  - continue home meds      VTE Risk Mitigation (From admission, onward)           Ordered     IP VTE HIGH RISK PATIENT  Once         01/22/25 2248     Place sequential compression device  Until discontinued         01/22/25 2248                         On 01/23/2025, patient should be placed in hospital observation services under my care in collaboration with Dr. Dell Torres, NP  Department of Hospital Medicine  [place] - Emergency Dept

## 2025-01-23 NOTE — ASSESSMENT & PLAN NOTE
Patient with known CAD s/p stent placement, which is uncontrolled Will continue ASA, Plavix, and Statin and monitor for S/Sx of angina/ACS. Continue to monitor on telemetry.     Patient with recent PTCA in September of 2024, all stents patent at time of procedure  Troponin elevated at 0.054, also with ESRD  EKG without ST elevation  Trend troponin    Given aspirin and nitro in ED  Consult Cardiology  Keep NPO  Hold Eliquis until Cardiology eval  Continue statin and Plavix

## 2025-01-23 NOTE — HPI
Pt is a 63yo Male with PMHx of ESRD on HD TTS, CAD w stents, presenting to ED with left substernal non-radiating Chest pain which started in the afternoon and did not subside.  In ED chest pain waxing and waning.  Patient was given aspirin and nitroglycerin which did improve patient's pain.  Labwork remarkable for stable anemia, chemistry shows hypokalemia at 2.8 which was replaced and labs consistent with ESRD.  Last HD was Saturday 1/18.  Troponin mildly elevated at 0.054.  EKG without ST elevation.  Chest x-ray was stable chronic changes.  Patient with a heart score of 6.  He will be admitted to Hospital Medicine with Cardiology eval for ACS rule out.  Nephrology consulted for HD management

## 2025-01-23 NOTE — CONSULTS
[facility] Consult Note     Attending Physician: Yolanda Zambrano MD  Cardiology Attending Physician: Enoch Bray MD  Date of Admit: 1/22/2025  Reason for Consult: chest pain      History of Present Illness:           Domingo Gross is a pleasant 64 y.o. male established patient with me with Pmhx of HTN, CKD IV, PAD with multiple intervention, CAD (mid LAD/prox RCA PCI 3/2019 and prox LCA in 10/2019 following out of hospital cardiac arrest), after cardiac arrest in setting of prox LCX occlusion treated with PCI, 9/16/2024 s/p intervention w cutting/shockwave PTCA to Lcx presents with his typical cardiac chest pain with minimal troponin elevation which is how he typically presents. ECG without changes.     Had a scheduled appointment with me 1/28/25     History obtained by patient interview and supplemented by nursing documentation and chart review.     PMHx:  has a past medical history of Acute congestive heart failure (9/13/2024), Allergy, Anemia, Anticoagulant long-term use, Arthritis, CKD (chronic kidney disease) stage 4, GFR 15-29 ml/min, COPD (chronic obstructive pulmonary disease) (08/20/2021), Coronary artery disease, Heart attack (10/04/2019), Hematuria, Hemothorax, Hyperlipidemia, Hypertension, Hyperuricemia, Hypocalcemia, Hypokalemia, Hypophosphatemia, Hypothyroidism (3/2/2023), PAD (peripheral artery disease), Proteinuria, and Vitamin D deficiency.   SurgHx:  has a past surgical history that includes Eye surgery; Cardiac catheterization; Left heart catheterization (N/A, 3/29/2019); Coronary angiography (N/A, 3/29/2019); Abdominal aortography (N/A, 5/10/2019); Percutaneous transluminal angioplasty (PTA) of peripheral vessel (N/A, 7/12/2019); Coronary angioplasty with stent (03/29/2019); Left heart catheterization (Left, 10/8/2019); Coronary angiography (Left, 10/11/2019); Cataract extraction; Cataract extraction w/  intraocular lens implant (Right, 6/8/2020); Cataract extraction w/   intraocular lens implant (Left, 7/2/2020); Bone marrow biopsy (Right, 10/12/2021); Aortography with serialography (N/A, 12/3/2021); Esophagogastroduodenoscopy (N/A, 1/6/2022); Colonoscopy (N/A, 1/6/2022); Aortography with serialography (N/A, 4/1/2022); Percutaneous transluminal angioplasty (PTA) of peripheral vessel (Left, 5/20/2022); Percutaneous transluminal angioplasty (PTA) of peripheral vessel (Right, 8/12/2022); intravascular ultrasound, non-coronary (8/12/2022); Left heart catheterization (Left, 4/10/2023); Coronary angiography (N/A, 4/10/2023); Left heart catheterization (Left, 4/25/2023); instantaneous wave-free ratio (ifr) (N/A, 4/25/2023); atherectomy, coronary (N/A, 4/25/2023); Percutaneous transluminal balloon angioplasty of coronary artery (4/25/2023); stent, drug eluting, single vessel, coronary (4/25/2023); ivus, coronary (4/25/2023); Left heart catheterization (Left, 5/16/2023); ivus, coronary (5/16/2023); ptca, single vessel (5/16/2023); stent, drug eluting, single vessel, coronary (5/16/2023); transesophageal echocardiogram with possible cardioversion (julian w/ poss cardioversion) (N/A, 6/19/2023); Insertion of tunneled central venous hemodialysis catheter (N/A, 2/9/2024); Removal of hemodialysis catheter (Right, 2/9/2024); Placement of arteriovenous graft (Left, 4/15/2024); Removal of tunneled central venous catheter (CVC) (Right, 5/20/2024); Left heart catheterization (Left, 6/26/2024); percutaneous coronary intervention, artery (N/A, 6/26/2024); ptca, single vessel (6/26/2024); repair, chronic total occlusion, coronary (6/26/2024); Coronary angiography (N/A, 6/26/2024); Coronary angiography (N/A, 9/16/2024); Left heart catheterization (Left, 9/16/2024); ptca, single vessel (Left, 9/16/2024); and lithotripsy, coronary transluminal, percutaneous (9/16/2024).   FamHx: family history includes Aneurysm in his mother; Cancer in his father; Diabetes in his sister; Heart disease in his mother;  "Hypertension in his mother and sister; No Known Problems in his brother and son.   SocialHx:  reports that he quit smoking about 25 years ago. His smoking use included cigarettes. He has never been exposed to tobacco smoke. He has never used smokeless tobacco. He reports that he does not drink alcohol and does not use drugs.  Home Meds:  Current Outpatient Medications   Medication Instructions    albuterol-ipratropium (DUO-NEB) 2.5 mg-0.5 mg/3 mL nebulizer solution Use 1 vial by nebulization every 6 (six) hours as needed for Wheezing or Shortness of Breath (or cough). Rescue    amLODIPine (NORVASC) 5 mg, Oral, Daily    apixaban (ELIQUIS) 5 mg, Oral, 2 times daily    carvediloL (COREG) 25 mg, Oral, 2 times daily with meals    cephALEXin (KEFLEX) 250 mg    clopidogreL (PLAVIX) 75 mg, Oral, Daily    coenzyme Q10 100 mg, Oral, Daily    eplerenone (INSPRA) 50 mg, Oral, Daily    furosemide (LASIX) 80 mg, Oral, Every other day, Non dialysis days only   Monday, Wednesday, Friday ,     hydrALAZINE (APRESOLINE) 50 mg, Oral, Every 8 hours    hydrOXYzine pamoate (VISTARIL) 25 mg, Oral, Nightly PRN    isosorbide mononitrate (IMDUR) 30 mg, Oral, Daily    levothyroxine (SYNTHROID) 75 mcg, Oral, Before breakfast    nitroGLYCERIN (NITROSTAT) 0.4 mg, Sublingual, Every 5 min PRN    ranolazine (RANEXA) 1,000 mg, Oral, 2 times daily    rosuvastatin (CRESTOR) 40 mg, Oral, Nightly       Review of Systems: Comprehensive ROS was performed and is negative unless otherwise noted in HPI.     Objective:   Last 24 Hour Vital Signs:  BP  Min: 99/58  Max: 142/70  Temp  Av.5 °F (36.9 °C)  Min: 97.5 °F (36.4 °C)  Max: 99 °F (37.2 °C)  Pulse  Av.7  Min: 60  Max: 100  Resp  Av.5  Min: 14  Max: 24  SpO2  Av.3 %  Min: 94 %  Max: 100 %  Height  Av' 10" (177.8 cm)  Min: 5' 10" (177.8 cm)  Max: 5' 10" (177.8 cm)  Weight  Av.6 kg (180 lb)  Min: 81.6 kg (180 lb)  Max: 81.6 kg (180 lb)  No intake/output data " recorded.  Physical Examination:  Constitutional: NAD, Atraumatic   HEENT: MMM, No JVD  Cardiovascular: RRR, no murmurs noted, no chest tenderness to palpation, 2+ radial pulses b/l  Pulmonary: normal respiratory effort, CTAB, no crackles, wheezes  Abdominal: S/NT/ND  Musculoskeletal: No lower extremity edema noted  Neurological: Alert & oriented to self, location, time and situation. No gross neurological deficits  Skin: W/D/I  Psych: Appropriate affect, normal mood    Laboratory:  Personally reviewed    Other Results:  TTE:  Results for orders placed during the hospital encounter of 09/13/24    Echo    Interpretation Summary    Left Ventricle: The left ventricle is mildly dilated. There is eccentric hypertrophy. Regional wall motion abnormalities present. See diagram for wall motion findings. Septal motion is consistent with bundle branch block. There is normal systolic function. Biplane (2D) method of discs ejection fraction is 54%. Grade II diastolic dysfunction.    Right Ventricle: Normal right ventricular cavity size. Systolic function is normal. TAPSE is 2.09 cm.    Left Atrium: Left atrium is moderately dilated. The left atrium volume index is 45.2 mL/m2.    Right Atrium: Right atrium is mildly dilated.    Mitral Valve: There is mild regurgitation.    Pulmonic Valve: There is mild regurgitation.    Pulmonary Artery: The estimated pulmonary artery systolic pressure is 11 mmHg.    IVC/SVC: Normal venous pressure at 3 mmHg.    Stress Testing:   Results for orders placed during the hospital encounter of 03/18/24    Treadmill Stress Test    Interpretation Summary    The ECG portion of the study is negative for ischemia.    The patient reported no chest pain during the stress test.    There were no arrhythmias during stress.    The nuclear portion of this study will be reported separately.     Coronary Angiogram:  Results for orders placed during the hospital encounter of 09/13/24    Cardiac  catheterization    Conclusion  Table formatting from the original result was not included.      The estimated blood loss was none.    [facility]  Surgery Department  Operative Note    SUMMARY  POST CATH NOTE    Date of Procedure: 9/16/2024    Procedure: Procedure(s) (LRB):  ANGIOGRAM, CORONARY ARTERY (N/A)  Left heart cath (Left)  PTCA, Single Vessel (Left)  LITHOTRIPSY, CORONARY TRANSLUMINAL, PERCUTANEOUS    Surgeons and Role:  * Enoch Tirado MD - Primary    Assisting Surgeon: None    Pre-Operative Diagnosis: Atherosclerosis of native coronary artery of native heart with unstable angina pectoris [I25.110]    Post-Operative Diagnosis: Post-Op Diagnosis Codes:  * Atherosclerosis of native coronary artery of native heart with unstable angina pectoris [I25.110]    s/p catheterization secondary to:    Cath Results:  Access: Us guided LCFA    Coronary Anatomy:    Left Main Coronary Artery: The left main is a large caliber vessel arising normally from the left sinus of valsalva.  It bifurcates into the left anterior descending and left circumflex coronary artery.  It is free of evidence of obstructive coronary artery disease. YADIRA III flow    Left Anterior Descending: The left anterior descending coronary artery is a large caliber vessel arising normally from the left main and extending to the apex.  It gives rise to small to moderate caliber diagonal arteries.  Stent are patent with minimal ISR. YADIRA III flow    Left Circumflex: The left circumflex is a large caliber vessel arising normally from the left main coronary artery, it is non-dominant.  It gives rise to moderate caliber obtuse marginal branches.  Ostium to prox stent into true Lcx 99% occluded and OM stent is completely occluded with microchannels. Right to left collaterals appreciated. YADIRA II flow.    Right Coronary Artery: The right coronary artery is a large caliber vessel arising normally from the right sinus of valsalva.  It is dominant  "giving rise to a PDA and PLV branch. Stents patent with mild ISR, distal RCA and PDA with 70-80% stenosis but are small vessels <2.0mm. YADIRA III flow    LVgram: LVEDP 5=15 mmHG    Intervention:  PCI procedure:  Heparin administered. ACT was closely monitored. Used a 7Fr x 45 cm destination.  Using a EBU 3.5guider, this was used to cannulate the left/right system.  Torey blue, prowater and runthrough PCI wire repshaped outside the body.  Advanced torey blue to Lcx, runthrough to OM and prowater to LAD. After advanced a a 2.5 NC to OM and inflated multiple times, a fresh 2.5 mm NC to Lcx and inflated multiple time improving flow, followed by KBI. IVUS advanced, decided to advanced a 2.5 angiosculpt, followed by IVL/schockwave 2.5 mm delivering all the therapies to stent with good results. IVUS advanced and decided to advanced a 3.0 Nc and inflated multiple time with great results. After the balloon was removed.  The wire was left in place.  Repeat angiography showed no signs of dissection.  Subsequently the wire was pulled back. Final angiography showed YADIRA-3 flow.  No signs of dissection, perforation, or thrombus.    Closure device: Manual pressure  Patient tolerated procedure well, no complications    Post Cath Exam:  BP (!) 115/58 (BP Location: Right leg, Patient Position: Lying)   Pulse (!) 58   Temp 97.5 °F (36.4 °C) (Temporal)   Resp 18   Ht 5' 10" (1.778 m)   Wt 70 kg (154 lb 5.2 oz)   SpO2 98%   BMI 22.14 kg/m²    Anesthesia: RN IV Sedation  Assessment:  PTCA and intravascular lithotripsy of Lcx and OM with excellent results  Patent stents in RCA and LAD    Plan:  Continue DAPT  Optimized medical regimen  HD post cath  Will follow up as an outpatient.  The procedure log was documented by Documenter: Kimberly Stoddard RT; Zev Yanez RT and verified by Enoch Bray MD.    Date: 9/16/2024  Time: 8:26 PM          Assessment/Plan:     Active Hospital Problems    Diagnosis    CAD (coronary artery " disease)    Hypokalemia    Unstable angina pectoris    Elevated troponin    ESRD (end stage renal disease) on dialysis    Paroxysmal atrial fibrillation    Hypothyroidism    HTN (hypertension)     Plan:  Start heparin gtt  DAPT  Restart home meds  Echo  NPO at [facility] for Cath tomorrow am         Enoch Bray MD  Interventional Cardiology  [facility]      -Reviewing Medical records & lab results  -Independently reviewing and interpreting (if not documented by myself) EKGs, echocardiograms, catherizations   -Obtaining a history, performing a relevant exam, counseling/educating the patient/family  -Documenting clinical information in the EMR including ordering of tests  -Care coordination and communicating with other health care providers

## 2025-01-23 NOTE — ED NOTES
Pt to ER with C/O midsternal CP with onset approx 2hrs ago.  Pt states pain is constant and nonradiating.  Pt rates pain 6/10.  Pt denies SOB.  Pt states hx of MI with stents.  Pt states dialysis on T,TH, and Sat.  Pt states last dialysis on Saturday.  Pt denies SOB, cough, or congestion.  Pt states febrile PTA.  Pt A/O x 3

## 2025-01-23 NOTE — ASSESSMENT & PLAN NOTE
Patient has paroxysmal (<7 days) atrial fibrillation. Patient is currently in sinus rhythm. [id number] Score: 1. The patients heart rate in the last 24 hours is as follows:  Pulse  Min: 60  Max: 100     Antiarrhythmics       Anticoagulants       Plan  - Replete lytes with a goal of K>4, Mg >2  - Patient is anticoagulated, see medications listed above.  - Patient's afib is currently controlled  - hold Eliquis until Cardiology eval

## 2025-01-23 NOTE — ED PROVIDER NOTES
Encounter Date: 1/22/2025       History     Chief Complaint   Patient presents with    Chest Pain     [id number] brought in a 65 y/o male from home with c/o chest pain for a couple of hours, URI symptoms for 2 days.  Right AC 18g inserted per EMS.  Accu check 154.Hx of stents, MI's, HTN, Dialysis, ESRD     HPI  64-year-old male with a history of CAD with stents, ESRD on HD, extensive medical history as listed below, presents brought in by EMS for chest pain.  Chest pain is substernal, started this afternoon, comes and goes, not exacerbated or alleviated by anything.  Denies other acute complaints.  Review of patient's allergies indicates:   Allergen Reactions    Ace inhibitors Rash     Past Medical History:   Diagnosis Date    Acute congestive heart failure 9/13/2024    Allergy     Anemia     Anticoagulant long-term use     Arthritis     CKD (chronic kidney disease) stage 4, GFR 15-29 ml/min     COPD (chronic obstructive pulmonary disease) 08/20/2021    Coronary artery disease     Heart attack 10/04/2019    Hematuria     Hemothorax     Hyperlipidemia     Hypertension     Hyperuricemia     Hypocalcemia     Hypokalemia     Hypophosphatemia     Hypothyroidism 3/2/2023    PAD (peripheral artery disease)     Proteinuria     Vitamin D deficiency      Past Surgical History:   Procedure Laterality Date    ABDOMINAL AORTOGRAPHY N/A 5/10/2019    Procedure: AORTOGRAM-ABDOMINAL;  Surgeon: Keo Jenkins MD;  Location: [facility] CATH LAB/EP;  Service: Cardiology;  Laterality: N/A;  RSFA intervention     AORTOGRAPHY WITH SERIALOGRAPHY N/A 12/3/2021    Procedure: AORTOGRAM, WITH SERIALOGRAPHY;  Surgeon: Keo Jenkins MD;  Location: [facility] CATH LAB/EP;  Service: Cardiology;  Laterality: N/A;    AORTOGRAPHY WITH SERIALOGRAPHY N/A 4/1/2022    Procedure: AORTOGRAM, WITH SERIALOGRAPHY;  Surgeon: Keo Jenkins MD;  Location: [facility] CATH LAB/EP;  Service: Cardiology;  Laterality: N/A;    ATHERECTOMY, CORONARY N/A 4/25/2023    Procedure:  Atherectomy-coronary;  Surgeon: Keo Jenkins MD;  Location: [facility] CATH LAB/EP;  Service: Cardiology;  Laterality: N/A;    BONE MARROW BIOPSY Right 10/12/2021    Procedure: BIOPSY-BONE MARROW;  Surgeon: Naseem Woods MD;  Location: [facility] OR;  Service: Oncology;  Laterality: Right;    CARDIAC CATHETERIZATION      CATARACT EXTRACTION      CATARACT EXTRACTION W/  INTRAOCULAR LENS IMPLANT Right 6/8/2020    Procedure: EXTRACTION, CATARACT, WITH IOL INSERTION;  Surgeon: Tin Light MD;  Location: [facility] OR;  Service: Ophthalmology;  Laterality: Right;    CATARACT EXTRACTION W/  INTRAOCULAR LENS IMPLANT Left 7/2/2020    Procedure: EXTRACTION, CATARACT, WITH IOL INSERTION;  Surgeon: Tin Light MD;  Location: [facility] OR;  Service: Ophthalmology;  Laterality: Left;    COLONOSCOPY N/A 1/6/2022    Procedure: COLONOSCOPY;  Surgeon: Kathe Penaloza MD;  [facility]  Service: Endoscopy;  Laterality: N/A;  COVID test at [facility] on 1/3-GT  okay to hold Plavix for 5 days and aspirin per Dr. Becerra  2nd floor due toextensive cardiac history   instructions mailed and [facility] -     CORONARY ANGIOGRAPHY N/A 3/29/2019    Procedure: ANGIOGRAM, CORONARY ARTERY;  Surgeon: Keo Jenkins MD;  Location: [facility] CATH LAB/EP;  Service: Cardiology;  Laterality: N/A;    CORONARY ANGIOGRAPHY Left 10/11/2019    Procedure: ANGIOGRAM, CORONARY ARTERY;  Surgeon: Keo Jenkins MD;  Location: [facility] CATH LAB/EP;  Service: Cardiology;  Laterality: Left;    CORONARY ANGIOGRAPHY N/A 4/10/2023    Procedure: ANGIOGRAM, CORONARY ARTERY;  Surgeon: Keo Jenkins MD;  Location: [facility] CATH LAB/EP;  Service: Cardiology;  Laterality: N/A;    CORONARY ANGIOGRAPHY N/A 6/26/2024    Procedure: ANGIOGRAM, CORONARY ARTERY;  Surgeon: Enoch Tirado MD;  Location: [facility] CATH LAB/EP;  Service: Cardiology;  Laterality: N/A;    CORONARY ANGIOGRAPHY N/A 9/16/2024    Procedure: ANGIOGRAM, CORONARY ARTERY;  Surgeon: Martin Bray  MD Enoch;  Location: [facility] CATH LAB/EP;  Service: Cardiology;  Laterality: N/A;    CORONARY ANGIOPLASTY WITH STENT PLACEMENT  03/29/2019    mid and distal RCA    ESOPHAGOGASTRODUODENOSCOPY N/A 1/6/2022    Procedure: EGD (ESOPHAGOGASTRODUODENOSCOPY);  Surgeon: Kathe Penaloza MD;  Location: [facility] ENDO [address]);  Service: Endoscopy;  Laterality: N/A;    EYE SURGERY      INSERTION OF TUNNELED CENTRAL VENOUS HEMODIALYSIS CATHETER N/A 2/9/2024    Procedure: INSERTION, CATHETER, HEMODIALYSIS, DUAL LUMEN;  Surgeon: Wang Mendieta MD;  Location: [facility] OR;  Service: General;  Laterality: N/A;    INSTANTANEOUS WAVE-FREE RATIO (IFR) N/A 4/25/2023    Procedure: Instantaneous Wave-Free Ratio (IFR);  Surgeon: Keo Jenkins MD;  Location: [facility] CATH LAB/EP;  Service: Cardiology;  Laterality: N/A;    INTRAVASCULAR ULTRASOUND, NON-CORONARY  8/12/2022    Procedure: Intravascular Ultrasound, Non-Coronary;  Surgeon: Keo Jenkins MD;  Location: [facility] CATH LAB/EP;  Service: Cardiology;;    IVUS, CORONARY  4/25/2023    Procedure: IVUS, Coronary;  Surgeon: Keo Jenkins MD;  Location: [facility] CATH LAB/EP;  Service: Cardiology;;    IVUS, CORONARY  5/16/2023    Procedure: IVUS, Coronary;  Surgeon: Keo Jenkins MD;  Location: [facility] CATH LAB/EP;  Service: Cardiology;;  CX    LEFT HEART CATHETERIZATION N/A 3/29/2019    Procedure: Left heart cath;  Surgeon: Keo Jenkins MD;  Location: [facility] CATH LAB/EP;  Service: Cardiology;  Laterality: N/A;    LEFT HEART CATHETERIZATION Left 10/8/2019    Procedure: Left heart cath;  Surgeon: Keo Jenkins MD;  Location: [facility] CATH LAB/EP;  Service: Cardiology;  Laterality: Left;    LEFT HEART CATHETERIZATION Left 4/10/2023    Procedure: Left heart cath;  Surgeon: Keo Jenkins MD;  Location: [facility] CATH LAB/EP;  Service: Cardiology;  Laterality: Left;    LEFT HEART CATHETERIZATION Left 4/25/2023    Procedure: Left heart cath;  Surgeon: Keo Jenkins MD;  Location: [facility] CATH LAB/EP;  Service: Cardiology;   Laterality: Left;    LEFT HEART CATHETERIZATION Left 5/16/2023    Procedure: Left heart cath;  Surgeon: Keo Jenkins MD;  Location: [facility] CATH LAB/EP;  Service: Cardiology;  Laterality: Left;    LEFT HEART CATHETERIZATION Left 6/26/2024    Procedure: Left heart cath;  Surgeon: Enoch Tirado MD;  Location: [facility] CATH LAB/EP;  Service: Cardiology;  Laterality: Left;    LEFT HEART CATHETERIZATION Left 9/16/2024    Procedure: Left heart cath;  Surgeon: Enoch Tirado MD;  Location: [facility] CATH LAB/EP;  Service: Cardiology;  Laterality: Left;    LITHOTRIPSY, CORONARY TRANSLUMINAL, PERCUTANEOUS  9/16/2024    Procedure: LITHOTRIPSY, CORONARY TRANSLUMINAL, PERCUTANEOUS;  Surgeon: Enoch Tirado MD;  Location: [facility] CATH LAB/EP;  Service: Cardiology;;    PERCUTANEOUS CORONARY INTERVENTION, ARTERY N/A 6/26/2024    Procedure: Percutaneous coronary intervention;  Surgeon: Enoch Tirado MD;  Location: [facility] CATH LAB/EP;  Service: Cardiology;  Laterality: N/A;    PERCUTANEOUS TRANSLUMINAL ANGIOPLASTY (PTA) OF PERIPHERAL VESSEL N/A 7/12/2019    Procedure: PTA, PERIPHERAL VESSEL;  Surgeon: Keo Jenkins MD;  Location: [facility] CATH LAB/EP;  Service: Cardiology;  Laterality: N/A;    PERCUTANEOUS TRANSLUMINAL ANGIOPLASTY (PTA) OF PERIPHERAL VESSEL Left 5/20/2022    Procedure: PTA, PERIPHERAL VESSEL;  Surgeon: Keo Jenkins MD;  Location: [facility] CATH LAB/EP;  Service: Cardiology;  Laterality: Left;    PERCUTANEOUS TRANSLUMINAL ANGIOPLASTY (PTA) OF PERIPHERAL VESSEL Right 8/12/2022    Procedure: PTA, PERIPHERAL VESSEL;  Surgeon: Keo Jenkins MD;  Location: [facility] CATH LAB/EP;  Service: Cardiology;  Laterality: Right;    PERCUTANEOUS TRANSLUMINAL BALLOON ANGIOPLASTY OF CORONARY ARTERY  4/25/2023    Procedure: Angioplasty-coronary;  Surgeon: Keo Jenkins MD;  Location: [facility] CATH LAB/EP;  Service: Cardiology;;    PLACEMENT OF ARTERIOVENOUS GRAFT Left 4/15/2024    Procedure: INSERTION, GRAFT, ARTERIOVENOUS;   Surgeon: Scott Pascual MD;  Location: [facility] OR;  Service: General;  Laterality: Left;    PTCA, SINGLE VESSEL  5/16/2023    Procedure: PTCA, Single Vessel;  Surgeon: Keo Jenkins MD;  Location: [facility] CATH LAB/EP;  Service: Cardiology;;  CX    PTCA, SINGLE VESSEL  6/26/2024    Procedure: PTCA, Single Vessel;  Surgeon: Enoch Tirado MD;  Location: [facility] CATH LAB/EP;  Service: Cardiology;;    PTCA, SINGLE VESSEL Left 9/16/2024    Procedure: PTCA, Single Vessel;  Surgeon: Enoch Tirado MD;  Location: [facility] CATH LAB/EP;  Service: Cardiology;  Laterality: Left;    REMOVAL OF HEMODIALYSIS CATHETER Right 2/9/2024    Procedure: REMOVAL, CATHETER, HEMODIALYSIS;  Surgeon: Wang Mendieta MD;  Location: [facility] OR;  Service: General;  Laterality: Right;    REMOVAL OF TUNNELED CENTRAL VENOUS CATHETER (CVC) Right 5/20/2024    Procedure: REMOVAL, CATHETER, CENTRAL VENOUS, TUNNELED;  Surgeon: Scott Pascual MD;  Location: [facility] OR;  Service: General;  Laterality: Right;    REPAIR, CHRONIC TOTAL OCCLUSION, CORONARY  6/26/2024    Procedure: Repair, Chronic Total Occlusion, Coronary;  Surgeon: Enoch Tirado MD;  Location: [facility] CATH LAB/EP;  Service: Cardiology;;    STENT, DRUG ELUTING, SINGLE VESSEL, CORONARY  4/25/2023    Procedure: Stent, Drug Eluting, Single Vessel, Coronary;  Surgeon: Keo Jenkins MD;  Location: [facility] CATH LAB/EP;  Service: Cardiology;;    STENT, DRUG ELUTING, SINGLE VESSEL, CORONARY  5/16/2023    Procedure: Stent, Drug Eluting, Single Vessel, Coronary;  Surgeon: Keo Jenkins MD;  Location: [facility] CATH LAB/EP;  Service: Cardiology;;  CX    TRANSESOPHAGEAL ECHOCARDIOGRAM WITH POSSIBLE CARDIOVERSION (JOSE W/ POSS CARDIOVERSION) N/A 6/19/2023    Procedure: Transesophageal echo (JOSE) intra-procedure log documentation;  Surgeon: Keo Jenkins MD;  Location: [facility] CATH LAB/EP;  Service: Cardiology;  Laterality: N/A;     Family History   Problem Relation Name Age of Onset    Aneurysm  Mother      Hypertension Mother      Heart disease Mother      Cancer Father      Diabetes Sister 1     Hypertension Sister 1     No Known Problems Brother 1     No Known Problems Son 1      Social History     Tobacco Use    Smoking status: Former     Current packs/day: 0.00     Types: Cigarettes     Quit date: 1999     Years since quittin.7     Passive exposure: Never    Smokeless tobacco: Never   Substance Use Topics    Alcohol use: No     Alcohol/week: 0.0 standard drinks of alcohol    Drug use: No   Medical surgical family and social history reviewed  Review of Systems  Complete review of systems was conducted and was negative except as per HPI and as marked  Physical Exam     Initial Vitals [25]   BP Pulse Resp Temp SpO2   (!) 142/70 100 18 99 °F (37.2 °C) 97 %      MAP       --         Physical Exam  Physical  General: Awake, alert, no acute distress  Head: Normocephalic, atraumatic  Neck: Trachea midline, full range of motion, no meningismus  ENT: Atraumatic, Airway Patent  Cardio: Regular Rate, Regular Rhythm, fistula murmur Capillary refill normal  Chest: Atraumatic, No respiratory distress no use of accessory muscles to breath, normal rate, sounds even and clear to auscultation  Abdomen: Soft, Nontender, no involuntary guarding, rigidity, or rebound.  Neuro: No gross cranial nerve abnormality, no lateralization, no gross sensory or motor deficits  ED Course   Procedures  Labs Reviewed   CBC W/ AUTO DIFFERENTIAL - Abnormal       Result Value    WBC 9.40      RBC 3.94 (*)     Hemoglobin 10.5 (*)     Hematocrit 32.4 (*)     MCV 82      MCH 26.6 (*)     MCHC 32.4      RDW 15.2 (*)     Platelets 155      MPV 9.8      Immature Granulocytes 0.3      Gran # (ANC) 7.8 (*)     Immature Grans (Abs) 0.03      Lymph # 0.5 (*)     Mono # 1.0      Eos # 0.1      Baso # 0.03      nRBC 0      Gran % 82.6 (*)     Lymph % 5.3 (*)     Mono % 10.9      Eosinophil % 0.6      Basophil % 0.3      Differential  Method Automated     COMPREHENSIVE METABOLIC PANEL - Abnormal    Sodium 137      Potassium 2.8 (*)     Chloride 98      CO2 19 (*)     Glucose 101      BUN 73 (*)     Creatinine 6.8 (*)     Calcium 7.4 (*)     Total Protein 7.0      Albumin 3.1 (*)     Total Bilirubin 0.6      Alkaline Phosphatase 70      AST 12      ALT 5 (*)     eGFR 8 (*)     Anion Gap 20 (*)    TROPONIN I - Abnormal    Troponin I 0.054 (*)    TROPONIN I - Abnormal    Troponin I 0.074 (*)    TROPONIN I - Abnormal    Troponin I 0.081 (*)     Narrative:     STAT, if not done in ED, then at 2nd and 6th hour from  initial draw.   B-TYPE NATRIURETIC PEPTIDE   COMPREHENSIVE METABOLIC PANEL   MAGNESIUM   TROPONIN I   LIPID PANEL   CBC W/ AUTO DIFFERENTIAL          Imaging Results              X-Ray Chest AP Portable (Final result)  Result time 01/22/25 21:13:59      Final result by Dio Babb DO (01/22/25 21:13:59)                   Impression:      Stable chronic changes as above.  No significant detrimental change or acute cardiopulmonary abnormality.      Electronically signed by: Dio Babb  Date:    01/22/2025  Time:    21:13               Narrative:    EXAMINATION:  XR CHEST AP PORTABLE    CLINICAL HISTORY:  Chest pain, unspecified    TECHNIQUE:  Single frontal view of the chest was performed.    COMPARISON:  09/13/2024.    FINDINGS:  There are postop changes of right upper lobectomy, with suture material overlying the right lung apex.  There are surgical clips.  The lungs are hypoexpanded.  There are continued chronic coarse interstitial opacities throughout the bilateral lungs, stable across multiple prior studies.  There is no significant detrimental change from prior.  The pleural spaces are clear.  The cardiac silhouette is borderline.  Osseous structures are intact.                                       Medications   sodium chloride 0.9% flush 10 mL (has no administration in time range)   naloxone 0.4 mg/mL injection 0.02 mg (has  no administration in time range)   glucose chewable tablet 16 g (has no administration in time range)   glucose chewable tablet 24 g (has no administration in time range)   dextrose 50% injection 12.5 g (has no administration in time range)   dextrose 50% injection 25 g (has no administration in time range)   glucagon (human recombinant) injection 1 mg (has no administration in time range)   ondansetron injection 4 mg (has no administration in time range)   acetaminophen tablet 1,000 mg (1,000 mg Oral Given 1/22/25 2031)   aspirin chewable tablet 162 mg (162 mg Oral Given 1/22/25 2030)   nitroGLYCERIN 2% TD oint ointment 1 inch (1 inch Topical (Top) Given 1/22/25 2031)   potassium bicarbonate disintegrating tablet 50 mEq (50 mEq Oral Given 1/22/25 2123)     Medical Decision Making  Amount and/or Complexity of Data Reviewed  Labs: ordered.  Radiology: ordered.    Risk  OTC drugs.  Prescription drug management.                64-year-old male presents for chest pain history of CAD with stents, ESRD.  Will evaluate for acute pathology requiring intervention with appropriate studies, treat symptomatically, and reassess.    Denies swelling of the legs, recent immobilization, surgery in last 4 weeks, history of PE or DVT, hemoptysis, or history of malignancy in last 6 months, or family history of DVT/PE. Denies ripping/tearing pains, family history of connective tissue disease or disease of the aorta.  After consideration of history, exam, and pertinent studies, risk stratification is done as such.  As is standard in the evaluation of chest pain, consideration was given to (but not limited to) EMCs: Aortic dissection, ACS, PE, and pneumothorax.  Pneumothorax excluded on the basis of Xray/imaging. Presentation is clinically inconsistent with aortic dissection. ADDRS is 0 (no high risk pain features, high risk exam features, or high risk medical history). Chest x-ray without mediastinal widening. No indication for further  evaluation for aortic dissection (risks associated with further evaluation including but not limited to contrast and radiation outweigh benefits). Lowest risk per wells score. No indication for further evaluation for PE, (risks associated with further evaluation including but not limited to contrast and radiation outweigh benefits).     Patient is however on the basis of other medical comorbidities, age, elevated troponin, and known CAD, in the highest risk group by both YADIRA and heart score for MAC E and mortality, as such they are not stable for discharge and he will require admission for formal ACS rule out.  Patient counseled and agrees with the admission.         Yadira - 4  Heart - 6        Clinical Impression:  Final diagnoses:  [R07.9] Chest pain  [R79.89] Elevated troponin (Primary)  [N18.6] ESRD (end stage renal disease)          ED Disposition Condition    Observation Stable                Chris Leiva MD  01/23/25 0009

## 2025-01-24 LAB
ALBUMIN SERPL BCP-MCNC: 2.8 G/DL (ref 3.5–5.2)
ALP SERPL-CCNC: 66 U/L (ref 40–150)
ALT SERPL W/O P-5'-P-CCNC: 6 U/L (ref 10–44)
ANION GAP SERPL CALC-SCNC: 17 MMOL/L (ref 8–16)
APTT PPP: 64.3 SEC (ref 21–32)
APTT PPP: 68 SEC (ref 21–32)
AST SERPL-CCNC: 12 U/L (ref 10–40)
BASOPHILS # BLD AUTO: 0.02 K/UL (ref 0–0.2)
BASOPHILS # BLD AUTO: 0.04 K/UL (ref 0–0.2)
BASOPHILS # BLD AUTO: 0.04 K/UL (ref 0–0.2)
BASOPHILS NFR BLD: 0.4 % (ref 0–1.9)
BASOPHILS NFR BLD: 0.7 % (ref 0–1.9)
BASOPHILS NFR BLD: 0.7 % (ref 0–1.9)
BILIRUB SERPL-MCNC: 0.7 MG/DL (ref 0.1–1)
BUN SERPL-MCNC: 51 MG/DL (ref 8–23)
CALCIUM SERPL-MCNC: 8.2 MG/DL (ref 8.7–10.5)
CHLORIDE SERPL-SCNC: 96 MMOL/L (ref 95–110)
CO2 SERPL-SCNC: 24 MMOL/L (ref 23–29)
CREAT SERPL-MCNC: 4.9 MG/DL (ref 0.5–1.4)
DIFFERENTIAL METHOD BLD: ABNORMAL
EOSINOPHIL # BLD AUTO: 0.1 K/UL (ref 0–0.5)
EOSINOPHIL NFR BLD: 1.5 % (ref 0–8)
EOSINOPHIL NFR BLD: 1.7 % (ref 0–8)
EOSINOPHIL NFR BLD: 1.7 % (ref 0–8)
ERYTHROCYTE [DISTWIDTH] IN BLOOD BY AUTOMATED COUNT: 14.9 % (ref 11.5–14.5)
ERYTHROCYTE [DISTWIDTH] IN BLOOD BY AUTOMATED COUNT: 15 % (ref 11.5–14.5)
ERYTHROCYTE [DISTWIDTH] IN BLOOD BY AUTOMATED COUNT: 15 % (ref 11.5–14.5)
EST. GFR  (NO RACE VARIABLE): 13 ML/MIN/1.73 M^2
GLUCOSE SERPL-MCNC: 78 MG/DL (ref 70–110)
HCT VFR BLD AUTO: 32.1 % (ref 40–54)
HCT VFR BLD AUTO: 34.7 % (ref 40–54)
HCT VFR BLD AUTO: 34.7 % (ref 40–54)
HGB BLD-MCNC: 10.6 G/DL (ref 14–18)
HGB BLD-MCNC: 11.3 G/DL (ref 14–18)
HGB BLD-MCNC: 11.3 G/DL (ref 14–18)
IMM GRANULOCYTES # BLD AUTO: 0.01 K/UL (ref 0–0.04)
IMM GRANULOCYTES # BLD AUTO: 0.01 K/UL (ref 0–0.04)
IMM GRANULOCYTES # BLD AUTO: 0.02 K/UL (ref 0–0.04)
IMM GRANULOCYTES NFR BLD AUTO: 0.2 % (ref 0–0.5)
IMM GRANULOCYTES NFR BLD AUTO: 0.2 % (ref 0–0.5)
IMM GRANULOCYTES NFR BLD AUTO: 0.4 % (ref 0–0.5)
LYMPHOCYTES # BLD AUTO: 0.8 K/UL (ref 1–4.8)
LYMPHOCYTES # BLD AUTO: 1.4 K/UL (ref 1–4.8)
LYMPHOCYTES # BLD AUTO: 1.4 K/UL (ref 1–4.8)
LYMPHOCYTES NFR BLD: 14.5 % (ref 18–48)
LYMPHOCYTES NFR BLD: 23.9 % (ref 18–48)
LYMPHOCYTES NFR BLD: 23.9 % (ref 18–48)
MAGNESIUM SERPL-MCNC: 1.8 MG/DL (ref 1.6–2.6)
MCH RBC QN AUTO: 26.6 PG (ref 27–31)
MCH RBC QN AUTO: 26.6 PG (ref 27–31)
MCH RBC QN AUTO: 26.8 PG (ref 27–31)
MCHC RBC AUTO-ENTMCNC: 32.6 G/DL (ref 32–36)
MCHC RBC AUTO-ENTMCNC: 32.6 G/DL (ref 32–36)
MCHC RBC AUTO-ENTMCNC: 33 G/DL (ref 32–36)
MCV RBC AUTO: 81 FL (ref 82–98)
MCV RBC AUTO: 82 FL (ref 82–98)
MCV RBC AUTO: 82 FL (ref 82–98)
MONOCYTES # BLD AUTO: 0.5 K/UL (ref 0.3–1)
MONOCYTES # BLD AUTO: 0.7 K/UL (ref 0.3–1)
MONOCYTES # BLD AUTO: 0.7 K/UL (ref 0.3–1)
MONOCYTES NFR BLD: 10 % (ref 4–15)
MONOCYTES NFR BLD: 12.5 % (ref 4–15)
MONOCYTES NFR BLD: 12.5 % (ref 4–15)
NEUTROPHILS # BLD AUTO: 3.6 K/UL (ref 1.8–7.7)
NEUTROPHILS # BLD AUTO: 3.6 K/UL (ref 1.8–7.7)
NEUTROPHILS # BLD AUTO: 3.9 K/UL (ref 1.8–7.7)
NEUTROPHILS NFR BLD: 61 % (ref 38–73)
NEUTROPHILS NFR BLD: 61 % (ref 38–73)
NEUTROPHILS NFR BLD: 73.2 % (ref 38–73)
NRBC BLD-RTO: 0 /100 WBC
PLATELET # BLD AUTO: 144 K/UL (ref 150–450)
PLATELET # BLD AUTO: 144 K/UL (ref 150–450)
PLATELET # BLD AUTO: 149 K/UL (ref 150–450)
PMV BLD AUTO: 10 FL (ref 9.2–12.9)
PMV BLD AUTO: 10.4 FL (ref 9.2–12.9)
PMV BLD AUTO: 10.4 FL (ref 9.2–12.9)
POC ACTIVATED CLOTTING TIME K: 250 SEC (ref 74–137)
POC ACTIVATED CLOTTING TIME K: 308 SEC (ref 74–137)
POC ACTIVATED CLOTTING TIME K: 331 SEC (ref 74–137)
POCT GLUCOSE: 89 MG/DL (ref 70–110)
POTASSIUM SERPL-SCNC: 3.5 MMOL/L (ref 3.5–5.1)
PROT SERPL-MCNC: 6.9 G/DL (ref 6–8.4)
RBC # BLD AUTO: 3.96 M/UL (ref 4.6–6.2)
RBC # BLD AUTO: 4.25 M/UL (ref 4.6–6.2)
RBC # BLD AUTO: 4.25 M/UL (ref 4.6–6.2)
SAMPLE: ABNORMAL
SODIUM SERPL-SCNC: 137 MMOL/L (ref 136–145)
WBC # BLD AUTO: 5.38 K/UL (ref 3.9–12.7)
WBC # BLD AUTO: 5.82 K/UL (ref 3.9–12.7)
WBC # BLD AUTO: 5.82 K/UL (ref 3.9–12.7)

## 2025-01-24 PROCEDURE — 80053 COMPREHEN METABOLIC PANEL: CPT | Performed by: REGISTERED NURSE

## 2025-01-24 PROCEDURE — 25000003 PHARM REV CODE 250: Performed by: REGISTERED NURSE

## 2025-01-24 PROCEDURE — C1885 CATH, TRANSLUMIN ANGIO LASER: HCPCS | Performed by: INTERNAL MEDICINE

## 2025-01-24 PROCEDURE — 92978 ENDOLUMINL IVUS OCT C 1ST: CPT | Performed by: INTERNAL MEDICINE

## 2025-01-24 PROCEDURE — 85025 COMPLETE CBC W/AUTO DIFF WBC: CPT | Mod: 91 | Performed by: INTERNAL MEDICINE

## 2025-01-24 PROCEDURE — 92921 HC PTCA , ADD'L BRANCH: CPT | Mod: LC | Performed by: INTERNAL MEDICINE

## 2025-01-24 PROCEDURE — 11000001 HC ACUTE MED/SURG PRIVATE ROOM

## 2025-01-24 PROCEDURE — 25000003 PHARM REV CODE 250: Performed by: INTERNAL MEDICINE

## 2025-01-24 PROCEDURE — 99152 MOD SED SAME PHYS/QHP 5/>YRS: CPT | Performed by: INTERNAL MEDICINE

## 2025-01-24 PROCEDURE — 25500020 PHARM REV CODE 255: Performed by: INTERNAL MEDICINE

## 2025-01-24 PROCEDURE — 93458 L HRT ARTERY/VENTRICLE ANGIO: CPT | Mod: 59 | Performed by: INTERNAL MEDICINE

## 2025-01-24 PROCEDURE — 36415 COLL VENOUS BLD VENIPUNCTURE: CPT | Performed by: INTERNAL MEDICINE

## 2025-01-24 PROCEDURE — C1769 GUIDE WIRE: HCPCS | Performed by: INTERNAL MEDICINE

## 2025-01-24 PROCEDURE — 27201423 OPTIME MED/SURG SUP & DEVICES STERILE SUPPLY: Performed by: INTERNAL MEDICINE

## 2025-01-24 PROCEDURE — C1887 CATHETER, GUIDING: HCPCS | Performed by: INTERNAL MEDICINE

## 2025-01-24 PROCEDURE — 99153 MOD SED SAME PHYS/QHP EA: CPT | Performed by: INTERNAL MEDICINE

## 2025-01-24 PROCEDURE — 85025 COMPLETE CBC W/AUTO DIFF WBC: CPT | Performed by: REGISTERED NURSE

## 2025-01-24 PROCEDURE — 99900035 HC TECH TIME PER 15 MIN (STAT)

## 2025-01-24 PROCEDURE — 83735 ASSAY OF MAGNESIUM: CPT | Performed by: REGISTERED NURSE

## 2025-01-24 PROCEDURE — C1725 CATH, TRANSLUMIN NON-LASER: HCPCS | Performed by: INTERNAL MEDICINE

## 2025-01-24 PROCEDURE — 027034Z DILATION OF CORONARY ARTERY, ONE ARTERY WITH DRUG-ELUTING INTRALUMINAL DEVICE, PERCUTANEOUS APPROACH: ICD-10-PCS | Performed by: INTERNAL MEDICINE

## 2025-01-24 PROCEDURE — 85347 COAGULATION TIME ACTIVATED: CPT | Performed by: INTERNAL MEDICINE

## 2025-01-24 PROCEDURE — 02C03ZZ EXTIRPATION OF MATTER FROM CORONARY ARTERY, ONE ARTERY, PERCUTANEOUS APPROACH: ICD-10-PCS | Performed by: INTERNAL MEDICINE

## 2025-01-24 PROCEDURE — 85730 THROMBOPLASTIN TIME PARTIAL: CPT | Mod: 91 | Performed by: STUDENT IN AN ORGANIZED HEALTH CARE EDUCATION/TRAINING PROGRAM

## 2025-01-24 PROCEDURE — 92921 PR PTCA, ADD'L VESSEL: CPT | Mod: LC,,, | Performed by: INTERNAL MEDICINE

## 2025-01-24 PROCEDURE — 92978 ENDOLUMINL IVUS OCT C 1ST: CPT | Mod: 26,,, | Performed by: INTERNAL MEDICINE

## 2025-01-24 PROCEDURE — C1753 CATH, INTRAVAS ULTRASOUND: HCPCS | Performed by: INTERNAL MEDICINE

## 2025-01-24 PROCEDURE — 96366 THER/PROPH/DIAG IV INF ADDON: CPT

## 2025-01-24 PROCEDURE — 02703ZZ DILATION OF CORONARY ARTERY, ONE ARTERY, PERCUTANEOUS APPROACH: ICD-10-PCS | Performed by: INTERNAL MEDICINE

## 2025-01-24 PROCEDURE — C1874 STENT, COATED/COV W/DEL SYS: HCPCS | Performed by: INTERNAL MEDICINE

## 2025-01-24 PROCEDURE — 85730 THROMBOPLASTIN TIME PARTIAL: CPT | Performed by: INTERNAL MEDICINE

## 2025-01-24 PROCEDURE — 63600175 PHARM REV CODE 636 W HCPCS: Performed by: INTERNAL MEDICINE

## 2025-01-24 PROCEDURE — 4A023N7 MEASUREMENT OF CARDIAC SAMPLING AND PRESSURE, LEFT HEART, PERCUTANEOUS APPROACH: ICD-10-PCS | Performed by: INTERNAL MEDICINE

## 2025-01-24 PROCEDURE — 92933 PRQ TRLML C ATHRC ST ANGIOP1: CPT | Mod: LC,,, | Performed by: INTERNAL MEDICINE

## 2025-01-24 PROCEDURE — C1894 INTRO/SHEATH, NON-LASER: HCPCS | Performed by: INTERNAL MEDICINE

## 2025-01-24 PROCEDURE — 99152 MOD SED SAME PHYS/QHP 5/>YRS: CPT | Mod: ,,, | Performed by: INTERNAL MEDICINE

## 2025-01-24 PROCEDURE — 93005 ELECTROCARDIOGRAM TRACING: CPT

## 2025-01-24 PROCEDURE — C9602 PERC D-E COR STENT ATHER S: HCPCS | Mod: LC | Performed by: INTERNAL MEDICINE

## 2025-01-24 PROCEDURE — 94761 N-INVAS EAR/PLS OXIMETRY MLT: CPT

## 2025-01-24 PROCEDURE — 93458 L HRT ARTERY/VENTRICLE ANGIO: CPT | Mod: 26,59,51, | Performed by: INTERNAL MEDICINE

## 2025-01-24 PROCEDURE — B211YZZ FLUOROSCOPY OF MULTIPLE CORONARY ARTERIES USING OTHER CONTRAST: ICD-10-PCS | Performed by: INTERNAL MEDICINE

## 2025-01-24 DEVICE — EVEROLIMUS-ELUTING PLATINUM CHROMIUM CORONARY STENT SYSTEM
Type: IMPLANTABLE DEVICE | Site: CHEST | Status: FUNCTIONAL
Brand: SYNERGY™ XD

## 2025-01-24 RX ORDER — ASPIRIN 81 MG/1
81 TABLET ORAL DAILY
Status: DISCONTINUED | OUTPATIENT
Start: 2025-01-25 | End: 2025-01-25 | Stop reason: HOSPADM

## 2025-01-24 RX ORDER — MIDAZOLAM HYDROCHLORIDE 1 MG/ML
INJECTION INTRAMUSCULAR; INTRAVENOUS
Status: DISCONTINUED | OUTPATIENT
Start: 2025-01-24 | End: 2025-01-24 | Stop reason: HOSPADM

## 2025-01-24 RX ORDER — FENTANYL CITRATE 50 UG/ML
INJECTION, SOLUTION INTRAMUSCULAR; INTRAVENOUS
Status: DISCONTINUED | OUTPATIENT
Start: 2025-01-24 | End: 2025-01-24 | Stop reason: HOSPADM

## 2025-01-24 RX ORDER — MUPIROCIN 20 MG/G
OINTMENT TOPICAL 2 TIMES DAILY
Status: DISCONTINUED | OUTPATIENT
Start: 2025-01-24 | End: 2025-01-25 | Stop reason: HOSPADM

## 2025-01-24 RX ORDER — HEPARIN SODIUM 200 [USP'U]/100ML
INJECTION, SOLUTION INTRAVENOUS
Status: DISCONTINUED | OUTPATIENT
Start: 2025-01-24 | End: 2025-01-25 | Stop reason: HOSPADM

## 2025-01-24 RX ORDER — IODIXANOL 320 MG/ML
INJECTION, SOLUTION INTRAVASCULAR
Status: DISCONTINUED | OUTPATIENT
Start: 2025-01-24 | End: 2025-01-25 | Stop reason: HOSPADM

## 2025-01-24 RX ORDER — HEPARIN SODIUM 1000 [USP'U]/ML
INJECTION, SOLUTION INTRAVENOUS; SUBCUTANEOUS
Status: DISCONTINUED | OUTPATIENT
Start: 2025-01-24 | End: 2025-01-24 | Stop reason: HOSPADM

## 2025-01-24 RX ORDER — VERAPAMIL HYDROCHLORIDE 2.5 MG/ML
INJECTION, SOLUTION INTRAVENOUS
Status: DISCONTINUED | OUTPATIENT
Start: 2025-01-24 | End: 2025-01-24 | Stop reason: HOSPADM

## 2025-01-24 RX ORDER — LIDOCAINE HYDROCHLORIDE 10 MG/ML
INJECTION, SOLUTION INFILTRATION; PERINEURAL
Status: DISCONTINUED | OUTPATIENT
Start: 2025-01-24 | End: 2025-01-24 | Stop reason: HOSPADM

## 2025-01-24 RX ORDER — SODIUM CHLORIDE 9 MG/ML
INJECTION, SOLUTION INTRAVENOUS ONCE
Status: COMPLETED | OUTPATIENT
Start: 2025-01-24 | End: 2025-01-24

## 2025-01-24 RX ORDER — PHENYLEPHRINE HCL IN 0.9% NACL 1 MG/10 ML
SYRINGE (ML) INTRAVENOUS
Status: DISCONTINUED | OUTPATIENT
Start: 2025-01-24 | End: 2025-01-24 | Stop reason: HOSPADM

## 2025-01-24 RX ORDER — ASPIRIN 325 MG
325 TABLET, DELAYED RELEASE (ENTERIC COATED) ORAL ONCE
Status: COMPLETED | OUTPATIENT
Start: 2025-01-24 | End: 2025-01-24

## 2025-01-24 RX ADMIN — MUPIROCIN: 20 OINTMENT TOPICAL at 10:01

## 2025-01-24 RX ADMIN — LEVOTHYROXINE SODIUM 75 MCG: 0.05 TABLET ORAL at 05:01

## 2025-01-24 RX ADMIN — AMLODIPINE BESYLATE 5 MG: 5 TABLET ORAL at 10:01

## 2025-01-24 RX ADMIN — ISOSORBIDE MONONITRATE 30 MG: 30 TABLET, EXTENDED RELEASE ORAL at 10:01

## 2025-01-24 RX ADMIN — ATORVASTATIN CALCIUM 80 MG: 40 TABLET, FILM COATED ORAL at 10:01

## 2025-01-24 RX ADMIN — CLOPIDOGREL BISULFATE 75 MG: 75 TABLET ORAL at 10:01

## 2025-01-24 RX ADMIN — RANOLAZINE 1000 MG: 500 TABLET, FILM COATED, EXTENDED RELEASE ORAL at 10:01

## 2025-01-24 RX ADMIN — HYDRALAZINE HYDROCHLORIDE 50 MG: 25 TABLET ORAL at 02:01

## 2025-01-24 RX ADMIN — SODIUM CHLORIDE: 9 INJECTION, SOLUTION INTRAVENOUS at 10:01

## 2025-01-24 RX ADMIN — CARVEDILOL 25 MG: 25 TABLET, FILM COATED ORAL at 10:01

## 2025-01-24 RX ADMIN — HYDRALAZINE HYDROCHLORIDE 50 MG: 25 TABLET ORAL at 05:01

## 2025-01-24 RX ADMIN — SPIRONOLACTONE 50 MG: 25 TABLET, FILM COATED ORAL at 10:01

## 2025-01-24 RX ADMIN — ASPIRIN 325 MG: 325 TABLET, COATED ORAL at 07:01

## 2025-01-24 NOTE — NURSING
Patient is for angiogram today. Kept npo. Still on heparin drip therapeutic range. Next aptt blood draw tomorrow AM. Both wrists and groin were clipped.     At 1615h patient sent for angiogram via stretcher. Heparin was stopped by cathlab RN.

## 2025-01-24 NOTE — ASSESSMENT & PLAN NOTE
Patient's blood pressure range in the last 24 hours was: BP  Min: 113/50  Max: 136/79.The patient's inpatient anti-hypertensive regimen is listed below:  Current Antihypertensives  amLODIPine tablet 5 mg, Daily, Oral  carvediloL tablet 25 mg, 2 times daily with meals, Oral  hydrALAZINE tablet 50 mg, Every 8 hours, Oral  isosorbide mononitrate 24 hr tablet 30 mg, Daily, Oral  nitroGLYCERIN SL tablet 0.4 mg, Every 5 min PRN, Sublingual  ranolazine 12 hr tablet 1,000 mg, 2 times daily, Oral  spironolactone tablet 50 mg, Daily, Oral    Plan  - BP is controlled, no changes needed to their regimen  - continue home meds

## 2025-01-24 NOTE — ASSESSMENT & PLAN NOTE
Patient has paroxysmal (<7 days) atrial fibrillation. Patient is currently in sinus rhythm. [id number] Score: 1. The patients heart rate in the last 24 hours is as follows:  Pulse  Min: 65  Max: 79     Antiarrhythmics       Anticoagulants  heparin 25,000 units in dextrose 5% 250 mL (100 units/mL) infusion LOW INTENSITY nomogram - [facility], Continuous, Intravenous  heparin 25,000 units in dextrose 5% (100 units/ml) IV bolus from bag LOW INTENSITY nomogram - [facility], As needed (PRN), Intravenous  heparin 25,000 units in dextrose 5% (100 units/ml) IV bolus from bag LOW INTENSITY nomogram - [facility], As needed (PRN), Intravenous    Plan  - Replete lytes with a goal of K>4, Mg >2  - Patient is anticoagulated, see medications listed above.  - Patient's afib is currently controlled  - hold Eliquis until Cardiology eval

## 2025-01-24 NOTE — PLAN OF CARE
1000  CM was informed by Dr Zambrano that the pt might be medically stable to discharge home today pending angiogram results.       [facility] - Telemetry  Initial Discharge Assessment       Primary Care Provider: Perez Bell DO    Admission Diagnosis: Unstable angina pectoris [I20.0]  ESRD (end stage renal disease) [N18.6]  Elevated troponin [R79.89]  PAD (peripheral artery disease) [I73.9]  Chest pain [R07.9]  S/P drug eluting coronary stent placement [Z95.5]  Atherosclerosis of native coronary artery of native heart with unstable angina pectoris [I25.110]  S/P arteriovenous (AV) graft placement [Z95.828]    Admission Date: 1/22/2025  Expected Discharge Date: 1/25/2025    Consult: card, neph, & nut    Payor: BLUE CROSS BLUE SHIELD / Plan: BCBS ALL OUT OF STATE / Product Type: PPO /     Extended Emergency Contact Information  Primary Emergency Contact: Karmen Gross   [place]  Mobile Phone: [phone]  Relation: Spouse    Discharge Plan A: Home with family  Discharge Plan B: [id number] - [place] - [address] AT [facility] [address]  [address]  [place] [address]  Phone: [phone] Fax: [phone]    [facility]  [address] [facility] [address]  [place] [address]  Phone: [phone] Fax: [phone]      Initial Assessment (most recent)       Adult Discharge Assessment - 01/24/25 1040          Discharge Assessment    Assessment Type Discharge Planning Assessment     Confirmed/corrected address, phone number and insurance Yes     Confirmed Demographics Correct on Facesheet     Source of Information patient     Communicated FLACO with patient/caregiver Yes     People in Home spouse;grandchild(guillermina)   spouse, Karmen Renato ([phone]), &  20 y.o. GS    Do you expect to return to your current living situation? Yes     Do you have help at home or someone to help you manage your care at home? Yes     Prior to  hospitilization cognitive status: Alert/Oriented     Current cognitive status: Alert/Oriented     Equipment Currently Used at Home blood pressure machine;cane, straight     Readmission within 30 days? No     Patient currently being followed by outpatient case management? No     Do you currently have service(s) that help you manage your care at home? No     Do you take prescription medications? Yes     Do you have prescription coverage? Yes     Do you have any problems affording any of your prescribed medications? No     Is the patient taking medications as prescribed? yes     How do you get to doctors appointments? family or friend will provide;car, drives self     Are you on dialysis? Yes (P)      Dialysis Name and Scheduled days [facility] (TTS) LUE AVF (P)      Do you take coumadin? No     Discharge Plan A Home with family     Discharge Plan B Home Health     DME Needed Upon Discharge  none     Discharge Plan discussed with: Patient        Physical Activity    On average, how many days per week do you engage in moderate to strenuous exercise (like a brisk walk)? 0 days     On average, how many minutes do you engage in exercise at this level? 0 min        Financial Resource Strain    How hard is it for you to pay for the very basics like food, housing, medical care, and heating? Somewhat hard (P)         Housing Stability    In the last 12 months, was there a time when you were not able to pay the mortgage or rent on time? Yes (P)      At any time in the past 12 months, were you homeless or living in a shelter (including now)? No (P)         Transportation Needs    Has the lack of transportation kept you from medical appointments, meetings, work or from getting things needed for daily living? No (P)         Food Insecurity    Within the past 12 months, you worried that your food would run out before you got the money to buy more. Never true (P)      Within the past 12 months, the food you bought just didn't  last and you didn't have money to get more. Never true (P)         Stress    Do you feel stress - tense, restless, nervous, or anxious, or unable to sleep at night because your mind is troubled all the time - these days? Not at all (P)         Social Isolation    How often do you feel lonely or isolated from those around you?  Never (P)         Alcohol Use    Q1: How often do you have a drink containing alcohol? Never (P)      Q2: How many drinks containing alcohol do you have on a typical day when you are drinking? Patient does not drink (P)      Q3: How often do you have six or more drinks on one occasion? Never (P)         [employer]    In the past 12 months has the electric, gas, oil, or water company threatened to shut off services in your home? No (P)         Health Literacy    How often do you need to have someone help you when you read instructions, pamphlets, or other written material from your doctor or pharmacy? Rarely (P)    pt wears glasses                  1040  Patient resting quietly in bed when CM rounded. No family present. Patient was admitted with unstable angina, is being followed by cards, neph, and nut, & is scheduled to have an angiogram done today. Pt denied chest pain or SOB at this time.     Patient lives with his spouse, Karmen Gross, & a 20 y.o. grandson, is independent of all ADLs, & denied the need for assistance with transportation at time of discharge.     CM informed the pt of a previously scheduled appointment with Dr Salazar (rayne) on 1/28/2025 at 1340. Pt verbalized understanding. Information added to the pt's discharge paperwork.     CM updated patient's whiteboard with CM name & contact information.       Will continue to follow.

## 2025-01-24 NOTE — SUBJECTIVE & OBJECTIVE
Interval History: No chest pain or other complaints, awaiting [facility]    Review of Systems   Cardiovascular:  Negative for chest pain (Improved with nitro).   All other systems reviewed and are negative.    Objective:     Vital Signs (Most Recent):  Temp: 97.7 °F (36.5 °C) (01/24/25 1211)  Pulse: 66 (01/24/25 1250)  Resp: 18 (01/24/25 1211)  BP: (!) 113/50 (01/24/25 1211)  SpO2: 96 % (01/24/25 1211) Vital Signs (24h Range):  Temp:  [97.6 °F (36.4 °C)-98.9 °F (37.2 °C)] 97.7 °F (36.5 °C)  Pulse:  [65-79] 66  Resp:  [18] 18  SpO2:  [95 %-97 %] 96 %  BP: (113-136)/(50-79) 113/50     Weight: 75.8 kg (167 lb 1.7 oz)  Body mass index is 23.98 kg/m².    Intake/Output Summary (Last 24 hours) at 1/24/2025 1500  Last data filed at 1/24/2025 0601  Gross per 24 hour   Intake 500 ml   Output 1700 ml   Net -1200 ml         Physical Exam  Vitals reviewed.   Constitutional:       Appearance: He is ill-appearing.   HENT:      Head: Normocephalic.      Mouth/Throat:      Mouth: Mucous membranes are moist.      Pharynx: Oropharynx is clear.   Eyes:      Pupils: Pupils are equal, round, and reactive to light.   Cardiovascular:      Rate and Rhythm: Normal rate and regular rhythm.   Pulmonary:      Effort: Pulmonary effort is normal.      Breath sounds: Normal breath sounds.   Abdominal:      General: Bowel sounds are normal.      Palpations: Abdomen is soft.   Musculoskeletal:         General: Normal range of motion.      Cervical back: Normal range of motion.   Skin:     General: Skin is warm and dry.      Capillary Refill: Capillary refill takes less than 2 seconds.   Neurological:      General: No focal deficit present.      Mental Status: He is alert and oriented to person, place, and time. Mental status is at baseline.   Psychiatric:         Mood and Affect: Mood normal.         Behavior: Behavior normal.             Significant Labs: All pertinent labs within the past 24 hours have been reviewed.  Blood Culture: No results for  "input(s): "LABBLOO" in the last 48 hours.  CBC:   Recent Labs   Lab 01/23/25  0356 01/23/25  1747 01/24/25  0544   WBC 7.44 5.59 5.82  5.82   HGB 9.9* 11.5* 11.3*  11.3*   HCT 30.2* 34.9* 34.7*  34.7*   * 142* 144*  144*     CMP:   Recent Labs   Lab 01/22/25  2030 01/23/25  0356 01/24/25  0544    136 137   K 2.8* 2.9* 3.5   CL 98 99 96   CO2 19* 22* 24    94 78   BUN 73* 77* 51*   CREATININE 6.8* 6.8* 4.9*   CALCIUM 7.4* 7.6* 8.2*   PROT 7.0 6.6 6.9   ALBUMIN 3.1* 2.9* 2.8*   BILITOT 0.6 0.7 0.7   ALKPHOS 70 67 66   AST 12 9* 12   ALT 5* 6* 6*   ANIONGAP 20* 15 17*       Significant Imaging: I have reviewed all pertinent imaging results/findings within the past 24 hours.  "

## 2025-01-24 NOTE — ED NOTES
Assumed care of pt here for CP and is admitted for elevated troponin. Pt denies any complaints currently. Pt ABCs intact, NAD. VSS. Pt is on room air. Dialysis access in LUE, pt is LUE limb alert.

## 2025-01-24 NOTE — ASSESSMENT & PLAN NOTE
Creatine stable for now. BMP reviewed- noted Estimated Creatinine Clearance: 15.9 mL/min (A) (based on SCr of 4.9 mg/dL (H)). according to latest data. Based on current GFR, CKD stage is end stage.  Monitor UOP and serial BMP and adjust therapy as needed. Renally dose meds. Avoid nephrotoxic medications and procedures.    Last HD 1/18, Saturday  Nephrology consulted for HD management

## 2025-01-24 NOTE — PROGRESS NOTES
[place] - Telemetry  Adult Nutrition  Progress Note    SUMMARY       Recommendations    Recommendation:  1. Encourage intake at meals as tolerated. 2. Monitor weight/labs.   3. RD to follow to monitor po intake    Goals:  Pt will tolerate diet with at least 50-75% intake at meals by RD follow up  Nutrition Goal Status: new  Communication of RD Recs: reviewed with RN    Assessment and Plan  Nutrition Problem  Altered nutrition related lab values    Related to (etiology):   ESRD on HD    Signs and Symptoms (as evidenced by):   K 2.9L, BUN 77H, Crea 6.8H, Ca 7.6L, Alb 2.9L     Interventions(treatment strategy):  Collaboration with other providers  Sodium/potassium/phosphorus restricted diet    Nutrition Diagnosis Status:   New      Malnutrition Assessment  Weight Loss (Malnutrition):  (2% x 6 months)       Reason for Assessment  Reason For Assessment: identified at risk by screening criteria ([facility])  Diagnosis:  (chest pain)  General Information Comments: Pt admitted with chest pain. Receives HD. Pt on Renal diet. Intake not recorded. Noted pt to be NPO tomorrow. Brian 20-skin intact. Noted 6lb weight loss x 6 months. Unable to assess NFPE 2/2 RD covering remotely due to weather.  Nutrition Discharge Planning: pt to d/c on Renal diet    Past Medical History:   Diagnosis Date    Acute congestive heart failure 9/13/2024    Allergy     Anemia     Anticoagulant long-term use     Arthritis     CKD (chronic kidney disease) stage 4, GFR 15-29 ml/min     COPD (chronic obstructive pulmonary disease) 08/20/2021    Coronary artery disease     Heart attack 10/04/2019    Hematuria     Hemothorax     Hyperlipidemia     Hypertension     Hyperuricemia     Hypocalcemia     Hypokalemia     Hypophosphatemia     Hypothyroidism 3/2/2023    PAD (peripheral artery disease)     Proteinuria     Vitamin D deficiency         Nutrition/Diet History  Food Preferences: no Quaker or cultural food prefs identified  Spiritual, Cultural Beliefs,  "Sikh Practices, Values that Affect Care: no  Factors Affecting Nutritional Intake: None identified at this time    Anthropometrics  Height: 5' 10" (177.8 cm)  Height (inches): 70 in  Weight: 75.8 kg (167 lb 1.7 oz)  Weight (lb): 167.11 lb  Weight Method: Bed Scale  Ideal Body Weight (IBW), Male: 166 lb  % Ideal Body Weight, Male (lb): 100.67 %  BMI (Calculated): 24  BMI Grade: 18.5-24.9 - normal  Usual Body Weight (UBW), k kg (7/10)  % Usual Body Weight: 97.38  % Weight Change From Usual Weight: -2.82 %     Lab/Procedures/Meds  Pertinent Labs Reviewed: reviewed  Pertinent Labs Comments: K 2.9L, BUN 77H, Crea 6.8H, Ca 7.6L, Alb 2.9L  Pertinent Medications Reviewed: reviewed  Pertinent Medications Comments: carvedilol, spironolactone, heparin    Estimated/Assessed Needs  Weight Used For Calorie Calculations: 75.8 kg (167 lb 1.7 oz)  Energy Calorie Requirements (kcal): 2274 (30 kcal/kg)  Energy Need Method: Kcal/kg  Protein Requirements: 75-90g (1.0-1.2g/kg)  Weight Used For Protein Calculations: 75.8 kg (167 lb 1.7 oz)  Estimated Fluid Requirement Method: RDA Method  RDA Method (mL): 2274     Nutrition Prescription Ordered  Current Diet Order: Renal    Evaluation of Received Nutrient/Fluid Intake  I/O: 500/2300  Energy Calories Required: not meeting needs  Protein Required: not meeting needs  Fluid Required: not meeting needs  Comments: LBM   % Intake of Estimated Energy Needs: Other: intake not recorded  % Meal Intake: Other: intake not recorded    Nutrition Risk  Level of Risk/Frequency of Follow-up:  (2x weekly)     Monitor and Evaluation  Food and Nutrient Intake: food and beverage intake  Food and Nutrient Adminstration: diet order  Physical Activity and Function: nutrition-related ADLs and IADLs  Anthropometric Measurements: weight  Biochemical Data, Medical Tests and Procedures: electrolyte and renal panel  Nutrition-Focused Physical Findings: overall appearance     Nutrition Related Social " Determinants of Health: SDOH: Adequate food in home environment     Nutrition Follow-Up  RD Follow-up?: Yes

## 2025-01-24 NOTE — NURSING
Admission completed by VN per phone, no camera available. Reviewed all labs, notes and orders. Educated patient on fall risk and VTE and reviewed POC with patient.  Patient verbalizes understanding, no questions at this time. Patient states his personal belongings are in reach and call light also in reach.    01/23/25 1848   Admission   Initial VN Admission Questions Complete   Shift   Virtual Nurse - Rounding Complete   Virtual Nurse - Patient Verbalized Approval Of Camera Use;VN Rounding   Type of Frequent Check   Type Patient Rounds   Safety/Activity   Patient Rounds bed in low position;bed wheels locked;call light in patient/parent reach;ID band on;visualized patient   Safety Promotion/Fall Prevention assistive device/personal item within reach   Safety Precautions emergency equipment at bedside   Activity Assistance Provided assistance, 1 person   Positioning   Body Position position changed independently

## 2025-01-24 NOTE — PROGRESS NOTES
[facility] Medicine  Progress Note    Patient Name: Domingo Gross  MRN: [id number]  Patient Class: OP- Observation   Admission Date: 1/22/2025  Length of Stay: 0 days  Attending Physician: Yolanda Zambrano MD  Primary Care Provider: Perez Bell DO        Subjective     Principal Problem:<principal problem not specified>        HPI:  Pt is a 63yo Male with PMHx of ESRD on HD TTS, CAD w stents, presenting to ED with left substernal non-radiating Chest pain which started in the afternoon and did not subside.  In ED chest pain waxing and waning.  Patient was given aspirin and nitroglycerin which did improve patient's pain.  Labwork remarkable for stable anemia, chemistry shows hypokalemia at 2.8 which was replaced and labs consistent with ESRD.  Last HD was Saturday 1/18.  Troponin mildly elevated at 0.054.  EKG without ST elevation.  Chest x-ray was stable chronic changes.  Patient with a heart score of 6.  He will be admitted to Hospital Medicine with Cardiology eval for ACS rule out.  Nephrology consulted for HD management    Overview/Hospital Course:  1/24/25 [facility] today    Interval History: No chest pain or other complaints, awaiting [facility]    Review of Systems   Cardiovascular:  Negative for chest pain (Improved with nitro).   All other systems reviewed and are negative.    Objective:     Vital Signs (Most Recent):  Temp: 97.7 °F (36.5 °C) (01/24/25 1211)  Pulse: 66 (01/24/25 1250)  Resp: 18 (01/24/25 1211)  BP: (!) 113/50 (01/24/25 1211)  SpO2: 96 % (01/24/25 1211) Vital Signs (24h Range):  Temp:  [97.6 °F (36.4 °C)-98.9 °F (37.2 °C)] 97.7 °F (36.5 °C)  Pulse:  [65-79] 66  Resp:  [18] 18  SpO2:  [95 %-97 %] 96 %  BP: (113-136)/(50-79) 113/50     Weight: 75.8 kg (167 lb 1.7 oz)  Body mass index is 23.98 kg/m².    Intake/Output Summary (Last 24 hours) at 1/24/2025 1500  Last data filed at 1/24/2025 0601  Gross per 24 hour   Intake 500 ml   Output 1700 ml   Net -1200 ml         Physical  "Exam  Vitals reviewed.   Constitutional:       Appearance: He is ill-appearing.   HENT:      Head: Normocephalic.      Mouth/Throat:      Mouth: Mucous membranes are moist.      Pharynx: Oropharynx is clear.   Eyes:      Pupils: Pupils are equal, round, and reactive to light.   Cardiovascular:      Rate and Rhythm: Normal rate and regular rhythm.   Pulmonary:      Effort: Pulmonary effort is normal.      Breath sounds: Normal breath sounds.   Abdominal:      General: Bowel sounds are normal.      Palpations: Abdomen is soft.   Musculoskeletal:         General: Normal range of motion.      Cervical back: Normal range of motion.   Skin:     General: Skin is warm and dry.      Capillary Refill: Capillary refill takes less than 2 seconds.   Neurological:      General: No focal deficit present.      Mental Status: He is alert and oriented to person, place, and time. Mental status is at baseline.   Psychiatric:         Mood and Affect: Mood normal.         Behavior: Behavior normal.             Significant Labs: All pertinent labs within the past 24 hours have been reviewed.  Blood Culture: No results for input(s): "LABBLOO" in the last 48 hours.  CBC:   Recent Labs   Lab 01/23/25  0356 01/23/25  1747 01/24/25  0544   WBC 7.44 5.59 5.82  5.82   HGB 9.9* 11.5* 11.3*  11.3*   HCT 30.2* 34.9* 34.7*  34.7*   * 142* 144*  144*     CMP:   Recent Labs   Lab 01/22/25  2030 01/23/25  0356 01/24/25  0544    136 137   K 2.8* 2.9* 3.5   CL 98 99 96   CO2 19* 22* 24    94 78   BUN 73* 77* 51*   CREATININE 6.8* 6.8* 4.9*   CALCIUM 7.4* 7.6* 8.2*   PROT 7.0 6.6 6.9   ALBUMIN 3.1* 2.9* 2.8*   BILITOT 0.6 0.7 0.7   ALKPHOS 70 67 66   AST 12 9* 12   ALT 5* 6* 6*   ANIONGAP 20* 15 17*       Significant Imaging: I have reviewed all pertinent imaging results/findings within the past 24 hours.    Assessment and Plan     Hypokalemia  Patient's most recent potassium results are listed below.   Recent Labs     " 01/22/25 2030 01/23/25  0356   K 2.8* 2.9*     Plan  - Replete potassium per protocol  - Monitor potassium Daily  - Patient's hypokalemia is worsening. Will continue current treatment  - continue Aldactone  -hold Lasix  -nephrology to adjust K bath        CAD (coronary artery disease)  Patient with known CAD s/p stent placement, which is uncontrolled Will continue ASA, Plavix, and Statin and monitor for S/Sx of angina/ACS. Continue to monitor on telemetry.     Patient with recent PTCA in September of 2024, all stents patent at time of procedure  Troponin elevated at 0.054, also with ESRD  EKG without ST elevation  Trend troponin    Given aspirin and nitro in ED  Consult Cardiology  Keep NPO  Hold Eliquis until Cardiology eval  Continue statin and Plavix      ESRD (end stage renal disease) on dialysis  Creatine stable for now. BMP reviewed- noted Estimated Creatinine Clearance: 15.9 mL/min (A) (based on SCr of 4.9 mg/dL (H)). according to latest data. Based on current GFR, CKD stage is end stage.  Monitor UOP and serial BMP and adjust therapy as needed. Renally dose meds. Avoid nephrotoxic medications and procedures.    Last HD 1/18, Saturday  Nephrology consulted for HD management      Paroxysmal atrial fibrillation  Patient has paroxysmal (<7 days) atrial fibrillation. Patient is currently in sinus rhythm. [id number] Score: 1. The patients heart rate in the last 24 hours is as follows:  Pulse  Min: 65  Max: 79     Antiarrhythmics       Anticoagulants  heparin 25,000 units in dextrose 5% 250 mL (100 units/mL) infusion LOW INTENSITY nomogram - [facility], Continuous, Intravenous  heparin 25,000 units in dextrose 5% (100 units/ml) IV bolus from bag LOW INTENSITY nomogram - [facility], As needed (PRN), Intravenous  heparin 25,000 units in dextrose 5% (100 units/ml) IV bolus from bag LOW INTENSITY nomogram - [facility], As needed (PRN), Intravenous    Plan  - Replete lytes with a goal of K>4, Mg >2  - Patient is anticoagulated, see  medications listed above.  - Patient's afib is currently controlled  - hold Eliquis until Cardiology eval          Hypothyroidism  Resume Synthroid      HTN (hypertension)  Patient's blood pressure range in the last 24 hours was: BP  Min: 113/50  Max: 136/79.The patient's inpatient anti-hypertensive regimen is listed below:  Current Antihypertensives  amLODIPine tablet 5 mg, Daily, Oral  carvediloL tablet 25 mg, 2 times daily with meals, Oral  hydrALAZINE tablet 50 mg, Every 8 hours, Oral  isosorbide mononitrate 24 hr tablet 30 mg, Daily, Oral  nitroGLYCERIN SL tablet 0.4 mg, Every 5 min PRN, Sublingual  ranolazine 12 hr tablet 1,000 mg, 2 times daily, Oral  spironolactone tablet 50 mg, Daily, Oral    Plan  - BP is controlled, no changes needed to their regimen  - continue home meds      VTE Risk Mitigation (From admission, onward)           Ordered     heparin 25,000 units in dextrose 5% (100 units/ml) IV bolus from bag LOW INTENSITY nomogram - [facility]  As needed (PRN)        Question:  Heparin Infusion Adjustment (DO NOT MODIFY ANSWER)  Answer:  \\[email or web address]\[email or web address]\Images\Pharmacy\HeparinInfusions\heparin LOW INTENSITY nomogram for [facility] [id number].pdf    01/23/25 1430     heparin 25,000 units in dextrose 5% (100 units/ml) IV bolus from bag LOW INTENSITY nomogram - [facility]  As needed (PRN)        Question:  Heparin Infusion Adjustment (DO NOT MODIFY ANSWER)  Answer:  \[email or web address]\Images\Pharmacy\HeparinInfusions\heparin LOW INTENSITY nomogram for [facility] [id number].pdf    01/23/25 1430     heparin 25,000 units in dextrose 5% 250 mL (100 units/mL) infusion LOW INTENSITY nomogram - [facility]  Continuous        Question:  Begin at (units/kg/hr)  Answer:  12    01/23/25 1430     IP VTE HIGH RISK PATIENT  Once         01/22/25 2248     Place sequential compression device  Until discontinued         01/22/25 2248                    Discharge Planning   FLACO: 1/25/2025     Code Status: Full Code   Medical Readiness for Discharge Date:    Discharge Plan A: Home with family                        Yolanda Zambrano MD  Department of Hospital Medicine   [facility]

## 2025-01-24 NOTE — PLAN OF CARE
Recommendation:  1. Encourage intake at meals as tolerated. 2. Monitor weight/labs.   3. RD to follow to monitor po intake    Goals:  Pt will tolerate diet with at least 50-75% intake at meals by RD follow up  Nutrition Goal Status: new

## 2025-01-25 VITALS
TEMPERATURE: 98 F | HEIGHT: 70 IN | WEIGHT: 159.81 LBS | DIASTOLIC BLOOD PRESSURE: 77 MMHG | SYSTOLIC BLOOD PRESSURE: 141 MMHG | OXYGEN SATURATION: 98 % | RESPIRATION RATE: 24 BRPM | HEART RATE: 68 BPM | BODY MASS INDEX: 22.88 KG/M2

## 2025-01-25 LAB
ANION GAP SERPL CALC-SCNC: 13 MMOL/L (ref 8–16)
APTT PPP: 32.8 SEC (ref 21–32)
APTT PPP: 32.8 SEC (ref 21–32)
BASOPHILS # BLD AUTO: 0.04 K/UL (ref 0–0.2)
BASOPHILS NFR BLD: 0.8 % (ref 0–1.9)
BUN SERPL-MCNC: 63 MG/DL (ref 8–23)
CALCIUM SERPL-MCNC: 8 MG/DL (ref 8.7–10.5)
CHLORIDE SERPL-SCNC: 99 MMOL/L (ref 95–110)
CO2 SERPL-SCNC: 26 MMOL/L (ref 23–29)
CREAT SERPL-MCNC: 5.5 MG/DL (ref 0.5–1.4)
DIFFERENTIAL METHOD BLD: ABNORMAL
EOSINOPHIL # BLD AUTO: 0.2 K/UL (ref 0–0.5)
EOSINOPHIL NFR BLD: 2.9 % (ref 0–8)
ERYTHROCYTE [DISTWIDTH] IN BLOOD BY AUTOMATED COUNT: 15.2 % (ref 11.5–14.5)
EST. GFR  (NO RACE VARIABLE): 11 ML/MIN/1.73 M^2
GLUCOSE SERPL-MCNC: 78 MG/DL (ref 70–110)
HCT VFR BLD AUTO: 30.5 % (ref 40–54)
HGB BLD-MCNC: 9.9 G/DL (ref 14–18)
IMM GRANULOCYTES # BLD AUTO: 0.01 K/UL (ref 0–0.04)
IMM GRANULOCYTES NFR BLD AUTO: 0.2 % (ref 0–0.5)
LYMPHOCYTES # BLD AUTO: 1 K/UL (ref 1–4.8)
LYMPHOCYTES NFR BLD: 19.1 % (ref 18–48)
MAGNESIUM SERPL-MCNC: 2.1 MG/DL (ref 1.6–2.6)
MCH RBC QN AUTO: 26.3 PG (ref 27–31)
MCHC RBC AUTO-ENTMCNC: 32.5 G/DL (ref 32–36)
MCV RBC AUTO: 81 FL (ref 82–98)
MONOCYTES # BLD AUTO: 0.7 K/UL (ref 0.3–1)
MONOCYTES NFR BLD: 13.4 % (ref 4–15)
NEUTROPHILS # BLD AUTO: 3.2 K/UL (ref 1.8–7.7)
NEUTROPHILS NFR BLD: 63.6 % (ref 38–73)
NRBC BLD-RTO: 0 /100 WBC
PHOSPHATE SERPL-MCNC: 6.3 MG/DL (ref 2.7–4.5)
PLATELET # BLD AUTO: 148 K/UL (ref 150–450)
PMV BLD AUTO: 10.2 FL (ref 9.2–12.9)
POTASSIUM SERPL-SCNC: 3.6 MMOL/L (ref 3.5–5.1)
RBC # BLD AUTO: 3.77 M/UL (ref 4.6–6.2)
SODIUM SERPL-SCNC: 138 MMOL/L (ref 136–145)
WBC # BLD AUTO: 5.09 K/UL (ref 3.9–12.7)

## 2025-01-25 PROCEDURE — 25000003 PHARM REV CODE 250: Performed by: INTERNAL MEDICINE

## 2025-01-25 PROCEDURE — 94761 N-INVAS EAR/PLS OXIMETRY MLT: CPT

## 2025-01-25 PROCEDURE — 85025 COMPLETE CBC W/AUTO DIFF WBC: CPT | Performed by: STUDENT IN AN ORGANIZED HEALTH CARE EDUCATION/TRAINING PROGRAM

## 2025-01-25 PROCEDURE — 80100016 HC MAINTENANCE HEMODIALYSIS

## 2025-01-25 PROCEDURE — 99900035 HC TECH TIME PER 15 MIN (STAT)

## 2025-01-25 PROCEDURE — 80048 BASIC METABOLIC PNL TOTAL CA: CPT | Performed by: INTERNAL MEDICINE

## 2025-01-25 PROCEDURE — 85730 THROMBOPLASTIN TIME PARTIAL: CPT | Performed by: STUDENT IN AN ORGANIZED HEALTH CARE EDUCATION/TRAINING PROGRAM

## 2025-01-25 PROCEDURE — 84100 ASSAY OF PHOSPHORUS: CPT | Performed by: INTERNAL MEDICINE

## 2025-01-25 PROCEDURE — 25000003 PHARM REV CODE 250: Performed by: REGISTERED NURSE

## 2025-01-25 PROCEDURE — 83735 ASSAY OF MAGNESIUM: CPT | Performed by: INTERNAL MEDICINE

## 2025-01-25 PROCEDURE — 99291 CRITICAL CARE FIRST HOUR: CPT | Mod: ,,, | Performed by: INTERNAL MEDICINE

## 2025-01-25 RX ORDER — CEPHALEXIN 250 MG/1
250 CAPSULE ORAL DAILY
Start: 2025-01-25

## 2025-01-25 RX ORDER — SODIUM CHLORIDE 9 MG/ML
INJECTION, SOLUTION INTRAVENOUS
OUTPATIENT
Start: 2025-01-25

## 2025-01-25 RX ORDER — SODIUM CHLORIDE 9 MG/ML
INJECTION, SOLUTION INTRAVENOUS ONCE
OUTPATIENT
Start: 2025-01-25 | End: 2025-01-25

## 2025-01-25 RX ORDER — CARVEDILOL 12.5 MG/1
12.5 TABLET ORAL 2 TIMES DAILY WITH MEALS
Qty: 180 TABLET | Refills: 3 | Status: SHIPPED | OUTPATIENT
Start: 2025-01-25 | End: 2026-01-25

## 2025-01-25 RX ADMIN — ASPIRIN 81 MG: 81 TABLET, COATED ORAL at 08:01

## 2025-01-25 RX ADMIN — CARVEDILOL 25 MG: 25 TABLET, FILM COATED ORAL at 05:01

## 2025-01-25 RX ADMIN — MUPIROCIN: 20 OINTMENT TOPICAL at 08:01

## 2025-01-25 RX ADMIN — ISOSORBIDE MONONITRATE 30 MG: 30 TABLET, EXTENDED RELEASE ORAL at 09:01

## 2025-01-25 RX ADMIN — CLOPIDOGREL BISULFATE 75 MG: 75 TABLET ORAL at 08:01

## 2025-01-25 RX ADMIN — ATORVASTATIN CALCIUM 80 MG: 40 TABLET, FILM COATED ORAL at 08:01

## 2025-01-25 RX ADMIN — LEVOTHYROXINE SODIUM 75 MCG: 0.05 TABLET ORAL at 05:01

## 2025-01-25 RX ADMIN — SPIRONOLACTONE 50 MG: 25 TABLET, FILM COATED ORAL at 08:01

## 2025-01-25 RX ADMIN — CARVEDILOL 25 MG: 25 TABLET, FILM COATED ORAL at 09:01

## 2025-01-25 NOTE — NURSING
Patient BP going down MAP between 59-62mm hg. Patient is asymptomatic. MD Gtz notified over phone. MD ordered CBC, C xray, NS bolus and Low dose Neosynephrine to begin at 0.5mcg/kg/min and titrate by 0.25 mcg/kg/min q5 min upto max of 1.25 mcg/kg/min stat with MAP goal of 60 mm hg. Nan muñoz RN witnessed and verified.

## 2025-01-25 NOTE — BRIEF OP NOTE
[place] - Cath Lab ([facility])  Surgery Department  Operative Note    SUMMARY   POST CATH NOTE    Date of Procedure: 1/24/2025     Procedure: Procedure(s) (LRB):  Angiogram, Coronary, with Left Heart Cath (N/A)  IVUS, Coronary  ANGIOPLASTY, CORONARY ARTERY, WITH STENT INSERTION (N/A)  Atherectomy-coronary (N/A)     Surgeons and Role:     * Valente Moran MD - Primary    Assisting Surgeon: None    Pre-Operative Diagnosis: Chest pain [R07.9]  Elevated troponin [R79.89]  ESRD (end stage renal disease) [N18.6]  S/P drug eluting coronary stent placement [Z95.5]    Post-Operative Diagnosis: Post-Op Diagnosis Codes:     * Chest pain [R07.9]     * Elevated troponin [R79.89]     * ESRD (end stage renal disease) [N18.6]     * S/P drug eluting coronary stent placement [Z95.5]    Description of Pre-Procedure(s): Prior to cardiac catheterization, the procedure was discussed at length with the patient including a comprehensive list of risks, benefits and alternatives.  Patient was informed of all possible complications and verbally acknowledged that they understood and the patient decided to proceed with cardiac catheterization.  Expressed written consent was confirmed in chart prior to beginning the procedure.  Myself along with the scrub tech and the nurse entered the room.  A formal timeout was performed in which patient was identified by name, date of birth, medical record number and procedure being performed; all agreed.  The patient was prepped and draped in the usual sterile fashion.  Conscious sedation was subsequently ordered and administered by a nurse specifically trained in conscious sedation. Please see attached procedure log for specifics regarding approach/equipment.   s/p catheterization secondary to:     Cath Results:  Access:  Right ulnar artery with 6-7 Urdu slender sheath  LM:  YADIRA III flow mild luminal irregularities  LAD: YADIRA III flow patent prox/mid stent with mild disease 30% proximal and mild  "disease in the mid segment 30-40%.  LCx:  YADIRA 0 flow.  Occluded proximal stent into OM 1 and into mid circ.  Prior bifurcation stent.   RCA: YADIRA III flow patent stents prox and mid with mild disease distally 10-20%.  There is right-to-left collaterals from the RCA to distal and mid circ  Dominance codominant    LVgram: LVEDP 11 mm Hg    Intervention:   Status post successful laser atherectomy using 0.9 mm fiber  Status post successful PTCA of prox left circumflex into OM1 with 3.5 mm by 12 REZA post dilated with 3.5 NC balloon at high pressure IVUS guided with excellent results.  Decided not to intervene in the mid circumflex bifurcation stent due to severe recurrent restenosis and already having collaterals from the RCA supplying the mid/distal left circumflex    During the procedure the patient developed hypotension.  Developed seizure activity from the hypotension which resolved after given Cayden-Synephrine and improvement in the blood pressure.     Closure device:  Vasc band  Patient tolerated procedure well, no complications    Post Cath Exam:  BP (!) 113/50 (BP Location: Right arm, Patient Position: Lying)   Pulse 71   Temp 97.7 °F (36.5 °C) (Oral)   Resp 18   Ht 5' 10" (1.778 m)   Wt 75.8 kg (167 lb 1.7 oz)   SpO2 96%   BMI 23.98 kg/m²     Anesthesia: RN IV Sedation      Estimated Blood Loss (EBL): * No values recorded between 1/24/2025  4:44 PM and 1/24/2025  6:13 PM *           Specimens:   Specimen (24h ago, onward)      None               Assessment:   Severe single-vessel coronary disease as above  Status post successful PTCA and stenting to the proximal left circumflex into OM1    Plan:   Continue aspirin and Plavix without interruption  Continue high-intensity statin.  LDL goal lower than 70  Maximize medical therapy  Risk factors reduction    "

## 2025-01-25 NOTE — NURSING
MD Gtz is at the bedside performing echo. MD stated okay to hold Neosynephrine for MAP of >60mm hg.

## 2025-01-25 NOTE — PROGRESS NOTES
[place] - Intensive Care  Cardiology  Progress Note    Patient Name: Domingo Gross  MRN: [id number]  Admission Date: 1/22/2025  Hospital Length of Stay: 1 days  Code Status: Full Code   Attending Physician: Yolanda Zambrano MD   Primary Care Physician: Perez Bell DO  Expected Discharge Date: 1/25/2025  Principal Problem:Elevated troponin    Subjective:     I saw the patient emergently overnight for low blood pressure.  Stat limited echocardiogram at bedside showed no pericardial effusion.  Improved with 250 mL bolus of normal saline.  Has not required any pressors.  Hemodynamically stable.  Wants to go home.    Review of Systems   Constitutional: Negative for chills and fever.   HENT:  Negative for hearing loss and nosebleeds.    Eyes:  Negative for blurred vision.   Cardiovascular:         As in HPI    Respiratory:  Negative for cough, hemoptysis and shortness of breath.    Endocrine: Negative for cold intolerance and polyuria.   Hematologic/Lymphatic: Negative for bleeding problem.   Skin:  Negative for itching.   Musculoskeletal:  Negative for falls.   Gastrointestinal:  Negative for abdominal pain and hematochezia.   Genitourinary:  Negative for hematuria.   Neurological:  Negative for dizziness and loss of balance.   Psychiatric/Behavioral:  Negative for altered mental status and depression.      Objective:     Vital Signs (Most Recent):  Temp: 97.6 °F (36.4 °C) (01/25/25 1130)  Pulse: 72 (01/25/25 1300)  Resp: 18 (01/25/25 1300)  BP: (!) 114/57 (01/25/25 1300)  SpO2: 95 % (01/25/25 1300) Vital Signs (24h Range):  Temp:  [97 °F (36.1 °C)-98 °F (36.7 °C)] 97.6 °F (36.4 °C)  Pulse:  [61-78] 72  Resp:  [11-35] 18  SpO2:  [93 %-98 %] 95 %  BP: ()/(48-74) 114/57     Weight: 72.5 kg (159 lb 13.3 oz)  Body mass index is 22.93 kg/m².    SpO2: 95 %         Intake/Output Summary (Last 24 hours) at 1/25/2025 1432  Last data filed at 1/25/2025 1330  Gross per 24 hour   Intake 590.52 ml   Output 1020 ml    Net -429.48 ml       Lines/Drains/Airways       Peripheral Intravenous Line  Duration                  Hemodialysis AV Fistula Left upper arm -- days         Peripheral IV - Single Lumen 18 G Anterior;Proximal;Right Forearm -- days         Peripheral IV - Single Lumen 01/23/25 1759 20 G 1 in No Anterior;Distal;Right Upper Arm 1 day                    Physical Exam  Constitutional:       Appearance: He is well-developed.   HENT:      Head: Normocephalic and atraumatic.   Eyes:      Conjunctiva/sclera: Conjunctivae normal.   Neck:      Vascular: No carotid bruit or JVD.   Cardiovascular:      Rate and Rhythm: Normal rate and regular rhythm.      Pulses:           Carotid pulses are 2+ on the right side and 2+ on the left side.       Radial pulses are 2+ on the right side and 2+ on the left side.      Heart sounds: Murmur heard.      No friction rub. No gallop.   Pulmonary:      Effort: Pulmonary effort is normal. No respiratory distress.      Breath sounds: Normal breath sounds. No stridor. No wheezing.   Abdominal:      General: Abdomen is flat.      Palpations: Abdomen is soft.   Musculoskeletal:      Cervical back: Neck supple.      Right lower leg: No edema.      Left lower leg: No edema.   Skin:     General: Skin is warm and dry.   Neurological:      Mental Status: He is alert and oriented to person, place, and time.   Psychiatric:         Behavior: Behavior normal.         Significant Labs: All pertinent lab results from the last 24 hours have been reviewed. and   Recent Lab Results  (Last 5 results in the past 24 hours)        01/25/25  0401   01/24/25  2204   01/24/25  1818   01/24/25  1759   01/24/25  1729        Anion Gap 13               PTT 32.8  Comment: Refer to local heparin nomogram for intensity/dose specific   therapeutic   range.  LOT^050^APTT [id number]                  32.8  Comment: Refer to local heparin nomogram for intensity/dose specific   therapeutic   range.  LOT^050^APTT [id number]                  Baso # 0.04   0.02             Basophil % 0.8   0.4             BUN 63               Calcium 8.0               Chloride 99               CO2 26               Creatinine 5.5               Differential Method Automated   Automated             eGFR 11               Eos # 0.2   0.1             Eos % 2.9   1.5             Glucose 78               Gran # (ANC) 3.2   3.9             Gran % 63.6   73.2             Hematocrit 30.5   32.1             Hemoglobin 9.9   10.6             Immature Grans (Abs) 0.01  Comment: Mild elevation in immature granulocytes is non specific and   can be seen in a variety of conditions including stress response,   acute inflammation, trauma and pregnancy. Correlation with other   laboratory and clinical findings is essential.     0.02  Comment: Mild elevation in immature granulocytes is non specific and   can be seen in a variety of conditions including stress response,   acute inflammation, trauma and pregnancy. Correlation with other   laboratory and clinical findings is essential.               Immature Granulocytes 0.2   0.4             Lymph # 1.0   0.8             Lymph % 19.1   14.5             Magnesium  2.1               MCH 26.3   26.8             MCHC 32.5   33.0             MCV 81   81             Mono # 0.7   0.5             Mono % 13.4   10.0             MPV 10.2   10.0             nRBC 0   0             Phosphorus Level 6.3               Platelet Count 148   149             POC ACTIVATED CLOTTING TIME K     250   308         POCT Glucose         89       Potassium 3.6               RBC 3.77   3.96             RDW 15.2   14.9             Sample     ARTERIAL   ARTERIAL         Sodium 138               WBC 5.09   5.38                                    Significant Imaging: Echocardiogram: Transthoracic echo (TTE) complete ([facility] Only):   Results for orders placed or performed during the hospital encounter of 09/13/24   Echo   Result Value Ref Range    BSA 1.95 m2    LA  WIDTH 3.8 cm    Child's Biplane MOD Ejection Fraction 54 %    A2C EF 54 %    A4C EF 56 %    LVOT stroke volume 90.53 cm3    LVIDd 5.95 3.5 - 6.0 cm    LV Systolic Volume 93.64 mL    LV Systolic Volume Index 48.0 mL/m2    LVIDs 4.52 (A) 2.1 - 4.0 cm    LV ESV A2C 47.43 mL    LV Diastolic Volume 176.90 mL    LV ESV A4C 53.07 mL    LV Diastolic Volume Index 90.72 mL/m2    LV EDV A2C 125.847038360923385 mL    LV EDV A4C 99.84 mL    Left Ventricular End Systolic Volume by Teichholz Method 93.64 mL    Left Ventricular End Diastolic Volume by Teichholz Method 176.90 mL    IVS 1.13 (A) 0.6 - 1.1 cm    LVOT diameter 2.22 cm    LVOT area 3.9 cm2    FS 24 (A) 28 - 44 %    Left Ventricle Relative Wall Thickness 0.41 cm    PW 1.23 (A) 0.6 - 1.1 cm    LV mass 302.79 g    LV Mass Index 155 g/m2    MV Peak E Jakob 1.03 m/s    TDI LATERAL 0.08 m/s    TDI SEPTAL 0.06 m/s    E/E' ratio 14.71 m/s    MV Peak A Jakob 0.77 m/s    TR Max Jakob 1.37 m/s    E/A ratio 1.34     E wave deceleration time 187.08 msec    LV SEPTAL E/E' RATIO 17.17 m/s    TABITHA 45.2 mL/m2    LV LATERAL E/E' RATIO 12.88 m/s    LA Vol 88.12 cm3    PV Peak S Jakob 0.53 m/s    PV Peak D Jakob 0.41 m/s    Pulm vein S/D ratio 1.29     LVOT peak jakob 1.17 m/s    Left Ventricular Outflow Tract Mean Velocity 0.83 cm/s    Left Ventricular Outflow Tract Mean Gradient 3.04 mmHg    RV- kang basal diam 3.8 cm    RV S' 13.33 cm/s    TAPSE 2.09 cm    LA size 3.96 cm    Left Atrium Minor Axis 6.82 cm    Left Atrium Major Axis 6.96 cm    LA Vol (MOD) 53.57 cm3    TABITHA (MOD) 27.5 mL/m2    RA Major Axis 5.33 cm    RA Width 3.96 cm    AV mean gradient 5 mmHg    AV peak gradient 9 mmHg    Ao peak jakob 1.48 m/s    Ao VTI 27.80 cm    LVOT peak VTI 23.40 cm    AV valve area 3.26 cm²    AV Velocity Ratio 0.79     AV index (prosthetic) 0.84     JILL by Velocity Ratio 3.06 cm²    Mr max jakob 4.96 m/s    MV mean gradient 2 mmHg    MV peak gradient 6 mmHg    MV valve area by continuity eq 2.31 cm2    MV VTI  39.2 cm    Triscuspid Valve Regurgitation Peak Gradient 8 mmHg    PV PEAK VELOCITY 1.14 m/s    PV peak gradient 5 mmHg    Pulmonary Valve Mean Velocity 0.81 m/s    Sinus 3.78 cm    STJ 2.60 cm    Ascending aorta 3.36 cm    Mean e' 0.07 m/s    ZLVIDS 2.21     ZLVIDD 0.68     LA area A4C 22.09 cm2    LA area A2C 19.29 cm2    TV resting pulmonary artery pressure 11 mmHg    RV TB RVSP 4 mmHg    Est. RA pres 3 mmHg    Narrative      Left Ventricle: The left ventricle is mildly dilated. There is   eccentric hypertrophy. Regional wall motion abnormalities present. See   diagram for wall motion findings. Septal motion is consistent with bundle   branch block. There is normal systolic function. Biplane (2D) method of   discs ejection fraction is 54%. Grade II diastolic dysfunction.    Right Ventricle: Normal right ventricular cavity size. Systolic   function is normal. TAPSE is 2.09 cm.    Left Atrium: Left atrium is moderately dilated. The left atrium volume   index is 45.2 mL/m2.    Right Atrium: Right atrium is mildly dilated.    Mitral Valve: There is mild regurgitation.    Pulmonic Valve: There is mild regurgitation.    Pulmonary Artery: The estimated pulmonary artery systolic pressure is   11 mmHg.    IVC/SVC: Normal venous pressure at 3 mmHg.       Assessment and Plan:     Brief HPI:       \    Active Diagnoses:    Diagnosis Date Noted POA    PRINCIPAL PROBLEM:  Elevated troponin [R79.89] 01/23/2025 Yes    CAD (coronary artery disease) [I25.10] 01/23/2025 Yes    Hypokalemia [E87.6] 01/23/2025 Yes    Unstable angina pectoris [I20.0] 01/23/2025 Unknown    ESRD (end stage renal disease) on dialysis [N18.6, Z99.2] 02/16/2024 Not Applicable    Paroxysmal atrial fibrillation [I48.0] 08/24/2023 Yes    Hypothyroidism [E03.9] 03/02/2023 Yes    HTN (hypertension) [I10] 04/29/2013 Yes      Problems Resolved During this Admission:       - overnight significantly hypotensive likely from sedation.  Did not require any pressor  support.  Recovered well post 250 mL bolus.  Stat limited echocardiogram at bedside personally performed by me shows no pericardial effusion.  Preserved left ventricular ejection fraction.  - recommend resuming Eliquis and Plavix without aspirin.  Underwent PCI of the left circumflex artery on 01/24/2025  -follow up with Dr. Cody in 1 week.  - I will slowly escalate home antihypertensive medications.  Starting Coreg at 12.5 mg twice a day instead of 25 mg twice a day.  Holding eplerenone, hydralazine, Imdur, amlodipine up until patient gets normotensive.  - hemoglobin 9.9, high-risk of bleeding and hence not continuing triple therapy.  Eliquis and Plavix combination should be continued  - stable from Cardiology standpoint to be discharged home today.      VTE Risk Mitigation (From admission, onward)           Ordered     heparin infusion 1,000 units/500 ml in 0.9% NaCl (pressure line flush)  Intra-op continuous PRN         01/24/25 1632     IP VTE HIGH RISK PATIENT  Once         01/22/25 2248     Place sequential compression device  Until discontinued         01/22/25 2248                    River Gtz MD  Cardiology  [place] - Intensive Care

## 2025-01-25 NOTE — PROGRESS NOTES
Nephrology Progress   Note     Consult Requested By: Yolanda Zambrano MD  Reason for Consult: ESRD     SUBJECTIVE:          ?    Review of Systems   Constitutional:  Negative for chills and fever.   HENT:  Negative for congestion and sore throat.    Eyes:  Negative for blurred vision, double vision and photophobia.   Respiratory:  Negative for cough and shortness of breath.    Cardiovascular:  Negative for chest pain, palpitations, orthopnea and leg swelling.   Gastrointestinal:  Negative for abdominal pain, diarrhea, nausea and vomiting.   Genitourinary:  Negative for dysuria and urgency.   Musculoskeletal:  Negative for joint pain and myalgias.   Skin:  Negative for itching and rash.   Neurological:  Negative for dizziness, sensory change, weakness and headaches.   Endo/Heme/Allergies:  Negative for polydipsia. Does not bruise/bleed easily.   Psychiatric/Behavioral:  Negative for depression.        Past Medical History:   Diagnosis Date    Acute congestive heart failure 9/13/2024    Allergy     Anemia     Anticoagulant long-term use     Arthritis     CKD (chronic kidney disease) stage 4, GFR 15-29 ml/min     COPD (chronic obstructive pulmonary disease) 08/20/2021    Coronary artery disease     Heart attack 10/04/2019    Hematuria     Hemothorax     Hyperlipidemia     Hypertension     Hyperuricemia     Hypocalcemia     Hypokalemia     Hypophosphatemia     Hypothyroidism 3/2/2023    PAD (peripheral artery disease)     Proteinuria     Vitamin D deficiency           OBJECTIVE:     Vital Signs (Most Recent)  Vitals:    01/25/25 0900 01/25/25 0915 01/25/25 0930 01/25/25 0945   BP: 124/65  (!) 122/57    BP Location:   Left leg    Patient Position:   Lying    Pulse: 71 74 75 70   Resp: 20 (!) 24 (!) 25 15   Temp:       TempSrc:       SpO2: 96% 97% 98% 98%   Weight:       Height:                     Medications:   amLODIPine  5 mg Oral Daily    aspirin  81 mg Oral Daily    atorvastatin  80 mg Oral Daily    carvediloL   25 mg Oral BID WM    clopidogreL  75 mg Oral Daily    hydrALAZINE  50 mg Oral Q8H    isosorbide mononitrate  30 mg Oral Daily    levothyroxine  75 mcg Oral Before breakfast    mupirocin   Nasal BID    ranolazine  1,000 mg Oral BID    spironolactone  50 mg Oral Daily             Physical Exam  Vitals and nursing note reviewed.   Constitutional:       General: He is not in acute distress.     Appearance: He is not diaphoretic.   HENT:      Head: Normocephalic and atraumatic.      Mouth/Throat:      Pharynx: No oropharyngeal exudate.   Eyes:      General: No scleral icterus.     Conjunctiva/sclera: Conjunctivae normal.      Pupils: Pupils are equal, round, and reactive to light.   Cardiovascular:      Rate and Rhythm: Normal rate and regular rhythm.      Heart sounds: Normal heart sounds. No murmur heard.  Pulmonary:      Effort: Pulmonary effort is normal. No respiratory distress.      Breath sounds: No rales.   Abdominal:      General: Bowel sounds are normal. There is no distension.      Palpations: Abdomen is soft.      Tenderness: There is no abdominal tenderness.   Musculoskeletal:         General: Normal range of motion.      Cervical back: Normal range of motion and neck supple.      Right lower leg: No edema.      Left lower leg: No edema.      Comments: LUE AVG    Skin:     General: Skin is warm and dry.      Findings: No erythema.   Neurological:      Mental Status: He is alert and oriented to person, place, and time.      Cranial Nerves: No cranial nerve deficit.      Comments:     Psychiatric:         Mood and Affect: Affect normal.         Cognition and Memory: Memory normal.         Judgment: Judgment normal.         Laboratory:  Recent Labs   Lab 01/24/25  0544 01/24/25  2204 01/25/25  0401   WBC 5.82  5.82 5.38 5.09   HGB 11.3*  11.3* 10.6* 9.9*   HCT 34.7*  34.7* 32.1* 30.5*   *  144* 149* 148*   MONO 12.5  12.5  0.7  0.7 10.0  0.5 13.4  0.7   EOSINOPHIL 1.7  1.7 1.5 2.9     Recent  Labs   Lab 01/23/25  0356 01/24/25  0544 01/25/25  0401    137 138   K 2.9* 3.5 3.6   CL 99 96 99   CO2 22* 24 26   BUN 77* 51* 63*   CREATININE 6.8* 4.9* 5.5*   CALCIUM 7.6* 8.2* 8.0*   PHOS  --   --  6.3*       Diagnostic Results:  X-Ray: Reviewed  US: Reviewed  Echo: Reviewed    Left Ventricle: The left ventricle is mildly dilated. There is eccentric hypertrophy. Regional wall motion abnormalities present. See diagram for wall motion findings. There is mildly reduced systolic function with a visually estimated ejection fraction of 45 - 50%. There is indeterminate diastolic function.Elevated left ventricular filling pressure.    Right Ventricle: Normal right ventricular cavity size. Wall thickness is normal. Systolic function is normal.    Left Atrium: Left atrium is mildly dilated.    Right Atrium: Right atrium is mildly dilated.    Mitral Valve: There is mild to moderate regurgitation.    Pulmonic Valve: There is moderate regurgitation.    Pulmonary Artery: There is pulmonary hypertension. The estimated pulmonary artery systolic pressure is 47 mmHg.    IVC/SVC: Intermediate venous pressure at 8 mmHg.  ASSESSMENT/PLAN:     ESRD on HD TTS with Dr Loly Payne     -- received HD yesterday after PCI  plan for HD today with [facility]  to keep on  TTS           Hypokalemia   -- replace as needed    -- 4 K bath     2. HTN   controlled       3. Anemia of ESRD on EPO and IV Iron outpatient - hold now s/p PCI for CAD   Recent Labs   Lab 01/24/25  0544 01/24/25  2204 01/25/25  0401   HGB 11.3*  11.3* 10.6* 9.9*   HCT 34.7*  34.7* 32.1* 30.5*   *  144* 149* 148*       Iron   Lab Results   Component Value Date    IRON 42 (L) 06/26/2024    TIBC 303 06/26/2024    FERRITIN 674 (H) 03/14/2024       4. MBD - PTH at goal   Recent Labs   Lab 01/25/25  0401   CALCIUM 8.0*   PHOS 6.3*   -- binder TIDWM   Recent Labs   Lab 01/23/25  0356 01/24/25  0544 01/25/25  0401   MG 1.9 1.8 2.1       Lab Results   Component Value Date     .6 (H) 06/26/2024    CALCIUM 8.0 (L) 01/25/2025    CAION 1.31 07/14/2020    PHOS 6.3 (H) 01/25/2025     Lab Results   Component Value Date    [id number] 9 (L) 03/14/2024     Acidosis - corrected  by   HD   Lab Results   Component Value Date    CO2 26 01/25/2025        Nutrition/Hypoalbuminemia    Recent Labs   Lab 01/23/25  0356 01/23/25  1747 01/24/25  0544   LABPROT  --  14.5*  --    ALBUMIN 2.9*  --  2.8*     Nepro with meals TID.       Thank you for the consult, will follow  With any question please call [phone]  Nena Arriola MD    [facility]    ESPERANZA Payne MD,   MD BRANDY Arredondo MD E. V. Harmon, NP    [address]  [place], [address]  [phone]

## 2025-01-25 NOTE — PLAN OF CARE
Problem: Adult Inpatient Plan of Care  Goal: Plan of Care Review  Outcome: Progressing     Problem: Acute Coronary Syndrome  Goal: Absence of Cardiac-Related Pain  Outcome: Progressing       Problem: Cardiac Catheterization (Diagnostic/Interventional)  Goal: Stable Heart Rate and Rhythm  Outcome: Progressing     Problem: Cardiac Catheterization (Diagnostic/Interventional)  Goal: Absence of Bleeding  Outcome: Progressing     Problem: Cardiac Catheterization (Diagnostic/Interventional)  Goal: Absence of Vascular Access Complication  Outcome: Progressing     Problem: Wound  Goal: Skin Health and Integrity  Outcome: Progressing     Problem: Fall Injury Risk  Goal: Absence of Fall and Fall-Related Injury  Outcome: Progressing

## 2025-01-25 NOTE — PROGRESS NOTES
Progress Note  Nephrology      Consult Requested By: Yolanda Zambrano MD      SUBJECTIVE:     Overnight events  Patient is a 64 y.o. male     Patient Active Problem List   Diagnosis    HTN (hypertension)    PAD (peripheral artery disease)    Claudication in peripheral vascular disease    DJD (degenerative joint disease), lumbar    Hyperlipidemia    Atherosclerosis of native artery of both lower extremities with intermittent claudication    Venous insufficiency of both lower extremities    Coronary artery disease    Bilateral carotid artery stenosis    Nephrotic range proteinuria    Nuclear sclerosis, bilateral    COPD exacerbation    Anemia due to chronic kidney disease, on chronic dialysis    Hypothyroidism    Unstable angina    Paroxysmal atrial fibrillation    EBV infection    CMV (cytomegalovirus infection)    Closed fracture of transverse process of lumbar vertebra    ESRD (end stage renal disease) on dialysis    Fall    Membranous nephropathy determined by biopsy    S/P arteriovenous (AV) graft placement    Hemodialysis catheter dysfunction    Anticoagulated    Pulmonary edema    Acute congestive heart failure    S/P drug eluting coronary stent placement    Atherosclerosis of native coronary artery of native heart with unstable angina pectoris    Hyperparathyroidism    Traumatic hematoma of right knee    Insomnia    CAD (coronary artery disease)    Hypokalemia    Unstable angina pectoris    Elevated troponin     Past Medical History:   Diagnosis Date    Acute congestive heart failure 9/13/2024    Allergy     Anemia     Anticoagulant long-term use     Arthritis     CKD (chronic kidney disease) stage 4, GFR 15-29 ml/min     COPD (chronic obstructive pulmonary disease) 08/20/2021    Coronary artery disease     Heart attack 10/04/2019    Hematuria     Hemothorax     Hyperlipidemia     Hypertension     Hyperuricemia     Hypocalcemia     Hypokalemia     Hypophosphatemia     Hypothyroidism 3/2/2023    PAD  "(peripheral artery disease)     Proteinuria     Vitamin D deficiency               OBJECTIVE:     Vitals:    01/24/25 1211 01/24/25 1250 01/24/25 1621 01/24/25 1835   BP: (!) 113/50   (!) 116/56   BP Location: Right arm   Right leg   Patient Position: Lying   Lying   Pulse: 69 66 71 63   Resp: 18   (!) 26   Temp: 97.7 °F (36.5 °C)   97.5 °F (36.4 °C)   TempSrc: Oral   Oral   SpO2: 96%   98%   Weight:    72.5 kg (159 lb 13.3 oz)   Height:    5' 10" (1.778 m)       Temp: 97.5 °F (36.4 °C) (01/24/25 1835)  Pulse: 63 (01/24/25 1835)  Resp: (!) 26 (01/24/25 1835)  BP: (!) 116/56 (01/24/25 1835)  SpO2: 98 % (01/24/25 1835)              Medications:   amLODIPine  5 mg Oral Daily    aspirin  325 mg Oral Once    [START ON 1/25/2025] aspirin  81 mg Oral Daily    atorvastatin  80 mg Oral Daily    carvediloL  25 mg Oral BID WM    clopidogreL  75 mg Oral Daily    hydrALAZINE  50 mg Oral Q8H    isosorbide mononitrate  30 mg Oral Daily    levothyroxine  75 mcg Oral Before breakfast    ranolazine  1,000 mg Oral BID    spironolactone  50 mg Oral Daily      heparin (porcine) in D5W  0-40 Units/kg/hr Intravenous Continuous   Stopped at 01/24/25 1615    heparin (porcine)    Continuous  mL/hr at 01/24/25 1632 1,500 Units/hr at 01/24/25 1632             Laboratory:  ABG  Labs reviewed  No results found for this or any previous visit (from the past 2 weeks).  Recent Results (from the past 2 weeks)   CBC auto differential    Collection Time: 01/24/25  5:44 AM   Result Value Ref Range    WBC 5.82 3.90 - 12.70 K/uL    Hemoglobin 11.3 (L) 14.0 - 18.0 g/dL    Hematocrit 34.7 (L) 40.0 - 54.0 %    Platelets 144 (L) 150 - 450 K/uL   CBC with Automated Differential    Collection Time: 01/24/25  5:44 AM   Result Value Ref Range    WBC 5.82 3.90 - 12.70 K/uL    Hemoglobin 11.3 (L) 14.0 - 18.0 g/dL    Hematocrit 34.7 (L) 40.0 - 54.0 %    Platelets 144 (L) 150 - 450 K/uL   CBC auto differential    Collection Time: 01/23/25  5:47 PM   Result " "Value Ref Range    WBC 5.59 3.90 - 12.70 K/uL    Hemoglobin 11.5 (L) 14.0 - 18.0 g/dL    Hematocrit 34.9 (L) 40.0 - 54.0 %    Platelets 142 (L) 150 - 450 K/uL     Urinalysis  No results for input(s): "COLORU", "CLARITYU", "SPECGRAV", "PHUR", "PROTEINUA", "GLUCOSEU", "BILIRUBINCON", "BLOODU", "WBCU", "RBCU", "BACTERIA", "MUCUS", "NITRITE", "LEUKOCYTESUR", "UROBILINOGEN", "HYALINECASTS" in the last 24 hours.    Diagnostic Results:  X-Ray: Reviewed  US: Reviewed  Echo: Reviewed  ACCESS    ASSESSMENT/PLAN:   S/p ANGIOPLASTY, CORONARY ARTERY, WITH STENT INSERTION   Episode of seizure activity, and hypotension.     ESRD  Metabolic bone disease  Anemia secondary to ESRD  Poor nutrition  Renal diet as tolerated  Will follow up  "

## 2025-01-25 NOTE — PLAN OF CARE
SW met with pt at HD to complete final dc assessment and spoke with pts wife Karmen. They stated that pt will need transport back home. SW confirmed pt's demographics. Per HD pt should be back in the room around 6pm. SW requested [facility] transport. Rounds completed on pt. All questions addressed. Bedside nurse to discuss d/c medications. Discussed importance to attend all f/u appts and take medications as prescribed. Verbalized understanding. Case Management to sign off.     CHELI Markham  [phone]    Future Appointments   Date Time Provider Department Center   1/28/2025  1:40 PM Enoch Tirado MD [facility] CARDIO [facility]   6/9/2025  8:00 AM Perez Bell DO [facility] MED [place]        01/25/25 1402   Final Note   Assessment Type Final Discharge Note   Anticipated Discharge Disposition Home   Post-Acute Status   Discharge Delays None known at this time

## 2025-01-25 NOTE — PLAN OF CARE
[facility] Plan of Care Note    Domingo Gross  1960  [id number]    64 year old male patient underwent a left heart catheterization, upon completion the patient became hypotensive and abnormal movements initially concerning for possible suspected seizure.  The event resolved with administration of neosynephrine which improved the blood pressure.  Given the reported event and sequence of events this was likely hypotensive convulsive syncope.  This clinical picture is further supported by the documented hypotension and the event resolving with vasopressors.      Objective:  Vitals:    01/25/25 1130   BP:    Pulse:    Resp:    Temp: 97.6 °F (36.4 °C)     Scheduled Meds:   amLODIPine  5 mg Oral Daily    aspirin  81 mg Oral Daily    atorvastatin  80 mg Oral Daily    carvediloL  25 mg Oral BID WM    clopidogreL  75 mg Oral Daily    hydrALAZINE  50 mg Oral Q8H    isosorbide mononitrate  30 mg Oral Daily    levothyroxine  75 mcg Oral Before breakfast    mupirocin   Nasal BID    ranolazine  1,000 mg Oral BID    spironolactone  50 mg Oral Daily     Laboratory Findings:   Reviewed all labs through [facility]    Neuroimaging:   Cardiac catheterization Result Date: 1/25/2025  Cath Results: Access:  Right ulnar artery with 6-7 Sinhala slender sheath LM:  YADIRA III flow mild luminal irregularities LAD: YADIRA III flow patent prox/mid stent with mild disease 30% proximal and mild disease in the mid segment 30-40%. LCx:  YADIRA 0 flow.  100% Occluded proximal stent into OM 1 and into mid circ.  Prior bifurcation stent. Reduced to 0% post intervention  prox lcx into OM1. RCA: YADIRA III flow patent stents prox and mid with mild disease distally 10-20%.  There is right-to-left collaterals from the RCA to distal and mid circ Dominance codominant  LVgram: LVEDP 11 mm Hg  Intervention: Status post successful laser atherectomy using 0.9 mm fiber Status post successful PTCA of prox left circumflex into OM1 with 3.5 mm by 12 REZA post dilated with  3.5 NC balloon at high pressure IVUS guided with excellent results.  Decided not to intervene in the mid circumflex bifurcation stent due to severe recurrent restenosis and already having collaterals from the RCA supplying the mid/distal left circumflex  During the procedure the patient developed hypotension.  Developed seizure activity from the hypotension which resolved after given Cayden-Synephrine and improvement in the blood pressure.  Closure device:  Vasc band Patient tolerated procedure well, no complications  Assessment: Severe single-vessel coronary disease as above Status post successful PTCA and stenting to the proximal left circumflex into OM1  Plan: Continue aspirin and Plavix without interruption Continue high-intensity statin.  LDL goal lower than 70 Maximize medical therapy Risk factors reduction The procedure log was documented by Documenter: Kimberly Stoddard RT; Ene Weaver RN and verified by Valente Moran MD. Date: 1/25/2025  Time: 7:21 AMThe complexity of this procedure required 200% of the typical effort, expertise, and associated risk for this type of endovascular intervention due to  complex anatomy and recurrent multiple failure of the prior stents requiring atherectomy as documented. The procedure log was documented by Documenter: Kimberly Stoddard RT; Ene Weaver RN and verified by Valente Moran MD. Date: 1/25/2025  Time: 7:39 AM     Plan:   - no further work up and or anti-seizure management required  - no need for any neurology work up at this time    Differential diagnosis was explained to the patient. All questions were answered. Patient understood and agreed to adhere to plan.     Anders Shaffer MD  [facility] PGY-IV  [facility] Consult Service

## 2025-01-26 NOTE — PROGRESS NOTES
Subjective:   @Patient ID:  Domingo Gross is a 64 y.o. male who presents for follow-up of No chief complaint on file.      HPI:     Domingo Gross is a pleasant 64 y.o. male established patient with me with Pmhx of HTN, CKD IV, PAD with multiple intervention, CAD (mid LAD/prox RCA PCI 3/2019 and prox LCA in 10/2019 following out of hospital cardiac arrest), after cardiac arrest in setting of prox LCX occlusion treated with PCI, 9/16/2024 s/p intervention w cutting/shockwave PTCA to Lcx presents  for hospital follow up. He had his typical cardiac chest pain with minimal troponin elevation,  ECG without changes given his history he was taken to the cath lab s/p PCI of LCx into OM. She states that he had no seizures activities during the case and BP was as usual but his BP med got discontinue except coreg. Patient is known vasculopath and likely BP cuff is not going to correlate. Voiced no cv complaints.     Patient Active Problem List    Diagnosis Date Noted    CAD (coronary artery disease) 01/23/2025    Hypokalemia 01/23/2025    Unstable angina pectoris 01/23/2025    Elevated troponin 01/23/2025    Insomnia 12/16/2024    Traumatic hematoma of right knee 11/12/2024    Hyperparathyroidism 11/05/2024    S/P drug eluting coronary stent placement 09/14/2024    Atherosclerosis of native coronary artery of native heart with unstable angina pectoris 09/14/2024    Acute congestive heart failure 09/13/2024    Pulmonary edema 09/09/2024    Anticoagulated 07/01/2024    Hemodialysis catheter dysfunction 05/16/2024    S/P arteriovenous (AV) graft placement 04/23/2024    Membranous nephropathy determined by biopsy 02/20/2024    Closed fracture of transverse process of lumbar vertebra 02/16/2024    ESRD (end stage renal disease) on dialysis 02/16/2024    Fall 02/16/2024    EBV infection 01/31/2024    CMV (cytomegalovirus infection) 01/31/2024    Paroxysmal atrial fibrillation 08/24/2023    Hypothyroidism 03/02/2023     Unstable angina 03/02/2023     I have messaged his cardiologist and electrophysiologist about this. Currently awaiting response       Anemia due to chronic kidney disease, on chronic dialysis 12/15/2021    COPD exacerbation 08/20/2021    Nuclear sclerosis, bilateral 06/08/2020    Nephrotic range proteinuria 04/06/2020    Bilateral carotid artery stenosis     Coronary artery disease 03/29/2019         3/29/2019    S/p LHC via R radial           LM, LAD, and LCX are patent with luminal irregularities  RCA mid 95% calcified lesion  Normal EF with LVEDP 8 mmHg           PCI of mid LAD with 2.5 x 30 and 2.5 x 15 mm Resolute REZA  PCI of proximal RCA to treat guide dissection with 3.5 x 22 Resolute REZA        10/11/2019 for cardiac arrest        S/p staged LCX PCI                    L CFA access              IVUS guided PCI              3.0 x 15 mm Resolute REZA post dilated with 3.5 NC balloon           Venous insufficiency of both lower extremities 06/04/2018    Atherosclerosis of native artery of both lower extremities with intermittent claudication 03/02/2018    Hyperlipidemia 06/12/2015    DJD (degenerative joint disease), lumbar 05/27/2015    Claudication in peripheral vascular disease 06/13/2014    PAD (peripheral artery disease) 05/21/2014 6/13/2014      1. Three vessel runoff below the knee bilaterally.  2. Severe disease of the terminal aorta.  3. Successful PTAS.  4. Bilateral common iliac reconstruction with 8 x 39 mm stent extending in the aorta  5. Right common illiac was treated with an overlapping 9 x 60 mm SES   6. Left common iliac was treated with an overlapping 8 x 80 mm SES down to external iliac  7. All stents were post dilated with 9.0 x 60 mm balloons  8. Moderate bilateral SFA disease  9. Chronically occluded left internal iliac artery        6/12/2015      1. 80% mid left SFA with a 20 mmHg resting mean gradient  2. Atherectomy with 2.2 Threat Stacknix Volcano catheter  3. PTA with 6.0 x 100 mm  "Lutonix drug coated balloon        6/9/2017      Patent bilateral GRUPO/EIA stents  L EIA PTAS 9.0 x 80 mm Life stent post dilated with 8.0 mm balloon  Bilateral 70-80% SFA stenosis with 3 vessel run off          3/2018      L SFA intervention for claudication   75% L SFA with 3 vessel run off   7 mmHg resting and 33 mmHg hyperemic mean gradient         L CFA antegrade access   PTA with 6.0 x 150 mm   PTA with 6.0 x 150 mm Lutonix   3 vessel run off           4/13/2018       S/p R SFA PTA for winsome IIb claudication   R CFA antegrade access         R CFA with 50% stenosis-heavily calcified lesion               Baseline /90         R SFA with 70% stenosis with a significant resting and hyperemic mean gradient     3 vessel run off             PTA of R SFA with 6.0 x 150 + 6.0 x 60 mm Lutonix DCB        5/2/2018   Patent arteries post revascularization        2/2019     R 0.84 to 0.27   L 0.90 to 0.66   After ambulation   Severe R calf claudication        Peripheral angiogram 5/10/2019                   95% R CFA stenosis; heavily calcified    Patent SFA, POP + 3 vessel run off      Peripheral intervention 7/12/2019    S/p right CFA revascularization for winsome IIb claudication       Assisted JOSY approach   L radial access for visualization   5-6 slender R PT access for delivery of therapy          Atherectomy with SilverHawk catheter   PTA with 7.0 x 40 mm Lutonix DCB            HTN (hypertension) 04/29/2013                    LABS  CBC  @LABRCNTIP(WBC:3,RBC:3,HGB:3,HCT:3,PLT:3,MCV:3,MCH:3,MCHC:3)@  BMP  @LABRCNTIP(NA:3,K:3,CO2:3,CL:3,BUN:3,Creatinine:3,GLU:3,GFR:3)@    POCT-Glucose  No results found for: "POCTGLUCOSE"      @LABRCNTIP(Calcium:3,MG:3,PHOS:3)@  LFT  @LABRCNTIP(PROT:3,Albumin:3,,Bilitot:3,AST:3,Alkphos:3,ALT:3)@  GFR     COAGS  @LABRCNTIP(PT:3,INR:3,APTT:3)@  CE  @LABRCNTIP(troponini:3,cktotal:3,ckmb:3)@  ABGs  @[id number](PH,PCO2,PO2,HCO3,POCSATURATED,BE)@  BNP  @LABRCNTIP(BNP:3)@    LAST " HbA1c  Lab Results   Component Value Date    HGBA1C 4.6 06/25/2024       Lipid panel  Lab Results   Component Value Date    CHOL 125 01/23/2025    CHOL 129 06/25/2024    CHOL 132 06/06/2024     Lab Results   Component Value Date    HDL 50 01/23/2025    HDL 63 06/25/2024    HDL 58 06/06/2024     Lab Results   Component Value Date    LDLCALC 59.8 (L) 01/23/2025    LDLCALC 55.2 (L) 06/25/2024    LDLCALC 56.0 (L) 06/06/2024     Lab Results   Component Value Date    TRIG 76 01/23/2025    TRIG 54 06/25/2024    TRIG 90 06/06/2024     Lab Results   Component Value Date    CHOLHDL 40.0 01/23/2025    CHOLHDL 48.8 06/25/2024    CHOLHDL 43.9 06/06/2024            Review of Systems   Cardiovascular:  Negative for chest pain.   All other systems reviewed and are negative.      Objective:   General: No acute stress  HENT: EOM intact, PERRL, oropharynx clear, mucous membranes clear and moist   Pulmonary: CTAB  Cardiovascular: regular rhythm, nl S1/S2, no murmurs, rubs or gallops  Abdomen: soft, non-tender, no distended no splenomegaly or hepatomegaly   Skin: warm, dry, no erythema, no rashes    Extremities: periph pulses intact, no cyanosis or edema   Neuro: a/ox4, clear speech, follows commands, no weakness or focal neurologic  deficit   Psych: mood and behavior normal       Assessment:     1. Primary hypertension    2. Congestive heart failure, unspecified HF chronicity, unspecified heart failure type    3. Persistent atrial fibrillation [I48.19]    4. Essential hypertension    5. Hyperlipidemia, unspecified hyperlipidemia type    6. Mixed hyperlipidemia    7. Atherosclerosis of native coronary artery of native heart without angina pectoris    8. PAD (peripheral artery disease)              Plan:     CAD with multiple interventions - asymptomatic. Now s/p PCI lcx to OM. Optimized anti anginals. Continue Plavix + eliquis.   HTN- continue current medication. Will uptitrate as tolerated. Only on coreg.   Afib per EP- continue  eliquis  AA- continue statin  PAD -currently asymptomatic. Continue plavix and statin.   BL BRITT- continue statin  ESRD on HD    Continue with current medical plan and lifestyle changes.  Return sooner for concerns or questions. If symptoms persist go to the ED  I have reviewed all pertinent data on this patient       I have reviewed the patient's medical history in detail and updated the computerized patient record.    No orders of the defined types were placed in this encounter.      Follow up as scheduled. Return sooner for concerns or questions            He expressed verbal understanding and agreed with the plan        Patient's Medications   New Prescriptions    No medications on file   Previous Medications    ALBUTEROL-IPRATROPIUM (DUO-NEB) 2.5 MG-0.5 MG/3 ML NEBULIZER SOLUTION    Use 1 vial by nebulization every 6 (six) hours as needed for Wheezing or Shortness of Breath (or cough). Rescue    APIXABAN (ELIQUIS) 5 MG TAB    Take 1 tablet (5 mg total) by mouth 2 (two) times daily.    CARVEDILOL (COREG) 12.5 MG TABLET    Take 1 tablet (12.5 mg total) by mouth 2 (two) times daily with meals.    CEPHALEXIN (KEFLEX) 250 MG CAPSULE    Take 1 capsule (250 mg total) by mouth Daily.    CLOPIDOGREL (PLAVIX) 75 MG TABLET    Take 1 tablet (75 mg total) by mouth once daily.    COENZYME Q10 100 MG CAPSULE    Take 1 capsule (100 mg total) by mouth once daily.    FUROSEMIDE (LASIX) 80 MG TABLET    Take 1 tablet (80 mg total) by mouth every other day. Non dialysis days only   Monday, Wednesday, Friday , Sunday    HYDROXYZINE PAMOATE (VISTARIL) 25 MG CAP    Take 1 capsule (25 mg total) by mouth nightly as needed (Sleep).    LEVOTHYROXINE (SYNTHROID) 75 MCG TABLET    Take 1 tablet (75 mcg total) by mouth before breakfast.    NITROGLYCERIN (NITROSTAT) 0.4 MG SL TABLET    Place 1 tablet (0.4 mg total) under the tongue every 5 (five) minutes as needed for Chest pain (for a max of 3 tabs in 15 minutes).    RANOLAZINE (RANEXA) 1,000  MG TB12    Take 1 tablet (1,000 mg total) by mouth 2 (two) times daily.    ROSUVASTATIN (CRESTOR) 40 MG TAB    Take 1 tablet (40 mg total) by mouth every evening.   Modified Medications    No medications on file   Discontinued Medications    No medications on file        Enoch Salazar MD  Cardiovascular Disease [facility]

## 2025-01-26 NOTE — NURSING
Discharge instruction reviewed and handed to pt. All patient belonging gathered. Ivs removed, pt removed from tele monitoring. Pt transported down with Transport Dispatch in wheelchair.

## 2025-01-27 ENCOUNTER — PATIENT MESSAGE (OUTPATIENT)
Dept: ADMINISTRATIVE | Facility: CLINIC | Age: 65
End: 2025-01-27
Payer: COMMERCIAL

## 2025-01-27 ENCOUNTER — PATIENT OUTREACH (OUTPATIENT)
Dept: ADMINISTRATIVE | Facility: CLINIC | Age: 65
End: 2025-01-27
Payer: COMMERCIAL

## 2025-01-27 LAB
OHS QRS DURATION: 158 MS
OHS QTC CALCULATION: 546 MS

## 2025-01-27 NOTE — PROGRESS NOTES
C3 nurse attempted to contact Domingo Gross for a TCC post hospital discharge follow up call. No answer. Left voicemail with callback information. The patient does not have a scheduled HOSFU appointment. Message sent to PCP staff for assistance with scheduling visit with patient.

## 2025-01-28 ENCOUNTER — OFFICE VISIT (OUTPATIENT)
Dept: CARDIOLOGY | Facility: CLINIC | Age: 65
End: 2025-01-28
Payer: COMMERCIAL

## 2025-01-28 VITALS
HEIGHT: 70 IN | DIASTOLIC BLOOD PRESSURE: 76 MMHG | BODY MASS INDEX: 24.87 KG/M2 | WEIGHT: 173.75 LBS | SYSTOLIC BLOOD PRESSURE: 138 MMHG | OXYGEN SATURATION: 99 % | HEART RATE: 65 BPM

## 2025-01-28 DIAGNOSIS — I25.10 ATHEROSCLEROSIS OF NATIVE CORONARY ARTERY OF NATIVE HEART WITHOUT ANGINA PECTORIS: ICD-10-CM

## 2025-01-28 DIAGNOSIS — E78.5 HYPERLIPIDEMIA, UNSPECIFIED HYPERLIPIDEMIA TYPE: ICD-10-CM

## 2025-01-28 DIAGNOSIS — E78.2 MIXED HYPERLIPIDEMIA: ICD-10-CM

## 2025-01-28 DIAGNOSIS — I48.19 PERSISTENT ATRIAL FIBRILLATION: ICD-10-CM

## 2025-01-28 DIAGNOSIS — I10 ESSENTIAL HYPERTENSION: ICD-10-CM

## 2025-01-28 DIAGNOSIS — I10 PRIMARY HYPERTENSION: Primary | ICD-10-CM

## 2025-01-28 DIAGNOSIS — I50.9 CONGESTIVE HEART FAILURE, UNSPECIFIED HF CHRONICITY, UNSPECIFIED HEART FAILURE TYPE: ICD-10-CM

## 2025-01-28 DIAGNOSIS — I73.9 PAD (PERIPHERAL ARTERY DISEASE): ICD-10-CM

## 2025-01-28 PROCEDURE — 3008F BODY MASS INDEX DOCD: CPT | Mod: CPTII,S$GLB,, | Performed by: STUDENT IN AN ORGANIZED HEALTH CARE EDUCATION/TRAINING PROGRAM

## 2025-01-28 PROCEDURE — 99215 OFFICE O/P EST HI 40 MIN: CPT | Mod: S$GLB,,, | Performed by: STUDENT IN AN ORGANIZED HEALTH CARE EDUCATION/TRAINING PROGRAM

## 2025-01-28 PROCEDURE — 3075F SYST BP GE 130 - 139MM HG: CPT | Mod: CPTII,S$GLB,, | Performed by: STUDENT IN AN ORGANIZED HEALTH CARE EDUCATION/TRAINING PROGRAM

## 2025-01-28 PROCEDURE — 99999 PR PBB SHADOW E&M-EST. PATIENT-LVL III: CPT | Mod: PBBFAC,,, | Performed by: STUDENT IN AN ORGANIZED HEALTH CARE EDUCATION/TRAINING PROGRAM

## 2025-01-28 PROCEDURE — 1111F DSCHRG MED/CURRENT MED MERGE: CPT | Mod: CPTII,S$GLB,, | Performed by: STUDENT IN AN ORGANIZED HEALTH CARE EDUCATION/TRAINING PROGRAM

## 2025-01-28 PROCEDURE — 3078F DIAST BP <80 MM HG: CPT | Mod: CPTII,S$GLB,, | Performed by: STUDENT IN AN ORGANIZED HEALTH CARE EDUCATION/TRAINING PROGRAM

## 2025-01-28 NOTE — PROGRESS NOTES
C3 nurse spoke with Domingo Munguialeland Gross (Spouse, Karmen) for a TCC post hospital discharge follow up call. The patient does not have a scheduled HOSFU appointment with Perez Bell DO within 5-7 days post hospital discharge date 01/25/2025. C3 nurse was unable to schedule HOSFU appointment in [facility].    Message sent to PCP staff requesting they contact patient and schedule follow up appointment.

## 2025-01-29 NOTE — DISCHARGE SUMMARY
[facility] Medicine  Discharge Summary      Patient Name: Domingo Gross  MRN: [id number]  NAOMI: [id number]  Patient Class: IP- Inpatient  Admission Date: 1/22/2025  Hospital Length of Stay: 1 days  Discharge Date and Time: 1/25/2025  6:53 PM  Attending Physician: No att. providers found   Discharging Provider: Yolanda Zambrano MD  Primary Care Provider: Perez Bell DO    Primary Care Team: Networked reference to record PCT     HPI:   Pt is a 65yo Male with PMHx of ESRD on HD TTS, CAD w stents, presenting to ED with left substernal non-radiating Chest pain which started in the afternoon and did not subside.  In ED chest pain waxing and waning.  Patient was given aspirin and nitroglycerin which did improve patient's pain.  Labwork remarkable for stable anemia, chemistry shows hypokalemia at 2.8 which was replaced and labs consistent with ESRD.  Last HD was Saturday 1/18.  Troponin mildly elevated at 0.054.  EKG without ST elevation.  Chest x-ray was stable chronic changes.  Patient with a heart score of 6.  He will be admitted to Hospital Medicine with Cardiology eval for ACS rule out.  Nephrology consulted for HD management    Procedure(s) (LRB):  Angiogram, Coronary, with Left Heart Cath (N/A)  IVUS, Coronary  ANGIOPLASTY, CORONARY ARTERY, WITH STENT INSERTION (N/A)  Atherectomy-coronary (N/A)      Hospital Course:   1/24/25 [facility] today     Goals of Care Treatment Preferences:  Code Status: Full Code      SDOH Screening:  The patient was screened for food insecurity, housing instability, transportation needs, utility difficulties, and interpersonal safety. The social determinant(s) of health identified as a concern this admission are:  Housing instability    Will not discuss with case management and/or community health workers.    Social Drivers of Health with Concerns     Housing Stability: High Risk (1/24/2025)        Consults:   Consults (From admission, onward)          Status  Ordering Provider     Inpatient consult to Social Work  Once        Provider:  (Not yet assigned)    Completed EDA AVILA     Nephrology-[facility]  Once        Provider:  (Not yet assigned)    Completed JESSICA ANTONIO     Cardiology-[facility]  Once        Provider:  (Not yet assigned)    Completed JESSICA ANTONIO            Hypokalemia  Patient's most recent potassium results are listed below.   Recent Labs     01/22/25 2030 01/23/25  0356   K 2.8* 2.9*     Plan  - Replete potassium per protocol  - Monitor potassium Daily  - Patient's hypokalemia is worsening. Will continue current treatment  - continue Aldactone  -hold Lasix  -nephrology to adjust K bath        CAD (coronary artery disease)  Patient with known CAD s/p stent placement, which is uncontrolled Will continue ASA, Plavix, and Statin and monitor for S/Sx of angina/ACS. Continue to monitor on telemetry.     Patient with recent PTCA in September of 2024, all stents patent at time of procedure  Troponin elevated at 0.054, also with ESRD  EKG without ST elevation  Trend troponin    Given aspirin and nitro in ED  Consult Cardiology  Keep NPO  Hold Eliquis until Cardiology eval  Continue statin and Plavix  [facility] performed  Assessment:   Severe single-vessel coronary disease as above  Status post successful PTCA and stenting to the proximal left circumflex into OM1     Plan:   Continue aspirin and Plavix without interruption  Continue high-intensity statin.  LDL goal lower than 70  Maximize medical therapy  Risk factors reduction     Had a provoked seizure in the setting of hypotension that resolved with pressors. Discussed with Neurology no need for further epilepsy work up at this time.    ESRD (end stage renal disease) on dialysis  Creatine stable for now. BMP reviewed- noted Estimated Creatinine Clearance: 15.9 mL/min (A) (based on SCr of 4.9 mg/dL (H)). according to latest data. Based on current GFR, CKD stage is  end stage.  Monitor UOP and serial BMP and adjust therapy as needed. Renally dose meds. Avoid nephrotoxic medications and procedures.    Last HD 1/18, Saturday  Nephrology consulted for HD management      Paroxysmal atrial fibrillation  Patient has paroxysmal (<7 days) atrial fibrillation. Patient is currently in sinus rhythm. [id number] Score: 1. The patients heart rate in the last 24 hours is as follows:  Pulse  Min: 65  Max: 79     Antiarrhythmics       Anticoagulants  heparin 25,000 units in dextrose 5% 250 mL (100 units/mL) infusion LOW INTENSITY nomogram - [facility], Continuous, Intravenous  heparin 25,000 units in dextrose 5% (100 units/ml) IV bolus from bag LOW INTENSITY nomogram - [facility], As needed (PRN), Intravenous  heparin 25,000 units in dextrose 5% (100 units/ml) IV bolus from bag LOW INTENSITY nomogram - [facility], As needed (PRN), Intravenous    Plan  - Replete lytes with a goal of K>4, Mg >2  - Patient is anticoagulated, see medications listed above.  - Patient's afib is currently controlled  - hold Eliquis until Cardiology eval          Hypothyroidism  Resume Synthroid      HTN (hypertension)  Patient's blood pressure range in the last 24 hours was: BP  Min: 113/50  Max: 136/79.The patient's inpatient anti-hypertensive regimen is listed below:  Current Antihypertensives  amLODIPine tablet 5 mg, Daily, Oral  carvediloL tablet 25 mg, 2 times daily with meals, Oral  hydrALAZINE tablet 50 mg, Every 8 hours, Oral  isosorbide mononitrate 24 hr tablet 30 mg, Daily, Oral  nitroGLYCERIN SL tablet 0.4 mg, Every 5 min PRN, Sublingual  ranolazine 12 hr tablet 1,000 mg, 2 times daily, Oral  spironolactone tablet 50 mg, Daily, Oral    Plan  - BP is controlled, no changes needed to their regimen  - continue home meds      Final Active Diagnoses:    Diagnosis Date Noted POA    PRINCIPAL PROBLEM:  Elevated troponin [R79.89] 01/23/2025 Yes    CAD (coronary artery disease) [I25.10] 01/23/2025 Yes    Hypokalemia [E87.6] 01/23/2025  Yes    Unstable angina pectoris [I20.0] 01/23/2025 Unknown    ESRD (end stage renal disease) on dialysis [N18.6, Z99.2] 02/16/2024 Not Applicable    Paroxysmal atrial fibrillation [I48.0] 08/24/2023 Yes    Hypothyroidism [E03.9] 03/02/2023 Yes    HTN (hypertension) [I10] 04/29/2013 Yes      Problems Resolved During this Admission:       Discharged Condition: stable    Disposition: Home or Self Care    Follow Up:   Follow-up Information       Enoch Tirado MD Follow up on 1/28/2025.    Specialties: Cardiology, Interventional Cardiology  Why: at 1:40pm; previously scheduled cardiology appointment  Contact information:  [address]  [address]  [place] [address]  [phone]                           Patient Instructions:      Diet renal     Notify your health care provider if you experience any of the following:  increased confusion or weakness     Notify your health care provider if you experience any of the following:  persistent dizziness, light-headedness, or visual disturbances     Notify your health care provider if you experience any of the following:  worsening rash     Notify your health care provider if you experience any of the following:  severe persistent headache     Notify your health care provider if you experience any of the following:  difficulty breathing or increased cough     Notify your health care provider if you experience any of the following:  redness, tenderness, or signs of infection (pain, swelling, redness, odor or green/yellow discharge around incision site)     Notify your health care provider if you experience any of the following:  severe uncontrolled pain     Notify your health care provider if you experience any of the following:  persistent nausea and vomiting or diarrhea     Cardiac rehab phase II   Standing Status: Future Standing Exp. Date: 01/24/26     Order Specific Question Answer Comments   [facility]      Activity as tolerated        Significant Diagnostic Studies: N/A    Pending Diagnostic Studies:       None           Medications:  Reconciled Home Medications:      Medication List        CHANGE how you take these medications      carvediloL 12.5 MG tablet  Commonly known as: COREG  Take 1 tablet (12.5 mg total) by mouth 2 (two) times daily with meals.  What changed:   medication strength  how much to take     cephALEXin 250 MG capsule  Commonly known as: KEFLEX  Take 1 capsule (250 mg total) by mouth Daily.  What changed: when to take this            CONTINUE taking these medications      albuterol-ipratropium 2.5 mg-0.5 mg/3 mL nebulizer solution  Commonly known as: DUO-NEB  Use 1 vial by nebulization every 6 (six) hours as needed for Wheezing or Shortness of Breath (or cough). Rescue     apixaban 5 mg Tab  Commonly known as: ELIQUIS  Take 1 tablet (5 mg total) by mouth 2 (two) times daily.     clopidogreL 75 mg tablet  Commonly known as: PLAVIX  Take 1 tablet (75 mg total) by mouth once daily.     coenzyme Q10 100 mg capsule  Take 1 capsule (100 mg total) by mouth once daily.     furosemide 80 MG tablet  Commonly known as: LASIX  Take 1 tablet (80 mg total) by mouth every other day. Non dialysis days only   Monday, Wednesday, Friday , Sunday     hydrOXYzine pamoate 25 MG Cap  Commonly known as: VISTARIL  Take 1 capsule (25 mg total) by mouth nightly as needed (Sleep).     levothyroxine 75 MCG tablet  Commonly known as: SYNTHROID  Take 1 tablet (75 mcg total) by mouth before breakfast.     nitroGLYCERIN 0.4 MG SL tablet  Commonly known as: NITROSTAT  Place 1 tablet (0.4 mg total) under the tongue every 5 (five) minutes as needed for Chest pain (for a max of 3 tabs in 15 minutes).     ranolazine 1,000 mg Tb12  Commonly known as: RANEXA  Take 1 tablet (1,000 mg total) by mouth 2 (two) times daily.     rosuvastatin 40 MG Tab  Commonly known as: CRESTOR  Take 1 tablet (40 mg total) by mouth every evening.            STOP taking these  medications      amLODIPine 5 MG tablet  Commonly known as: NORVASC     eplerenone 50 MG Tab  Commonly known as: INSPRA     hydrALAZINE 50 MG tablet  Commonly known as: APRESOLINE     isosorbide mononitrate 30 MG 24 hr tablet  Commonly known as: IMDUR              Indwelling Lines/Drains at time of discharge:   Lines/Drains/Airways       Drain  Duration                  Hemodialysis AV Fistula Left upper arm -- days                    Time spent on the discharge of patient: 40 minutes    Critical care time spent on the evaluation and treatment of severe organ dysfunction, review of pertinent labs and imaging studies, discussions with consulting providers and discussions with patient/family: 40 minutes.     Yolanda Zambrano MD  Department of Hospital Medicine  [place] - Intensive Care

## 2025-01-29 NOTE — ASSESSMENT & PLAN NOTE
Patient with known CAD s/p stent placement, which is uncontrolled Will continue ASA, Plavix, and Statin and monitor for S/Sx of angina/ACS. Continue to monitor on telemetry.     Patient with recent PTCA in September of 2024, all stents patent at time of procedure  Troponin elevated at 0.054, also with ESRD  EKG without ST elevation  Trend troponin    Given aspirin and nitro in ED  Consult Cardiology  Keep NPO  Hold Eliquis until Cardiology eval  Continue statin and Plavix  [facility] performed  Assessment:   Severe single-vessel coronary disease as above  Status post successful PTCA and stenting to the proximal left circumflex into OM1     Plan:   Continue aspirin and Plavix without interruption  Continue high-intensity statin.  LDL goal lower than 70  Maximize medical therapy  Risk factors reduction     Had a provoked seizure in the setting of hypotension that resolved with pressors. Discussed with Neurology no need for further epilepsy work up at this time.

## 2025-02-22 DIAGNOSIS — Z00.00 ENCOUNTER FOR MEDICARE ANNUAL WELLNESS EXAM: ICD-10-CM

## 2025-03-25 ENCOUNTER — PATIENT MESSAGE (OUTPATIENT)
Dept: FAMILY MEDICINE | Facility: CLINIC | Age: 65
End: 2025-03-25
Payer: COMMERCIAL

## 2025-04-22 DIAGNOSIS — I48.19 PERSISTENT ATRIAL FIBRILLATION: ICD-10-CM

## 2025-04-30 ENCOUNTER — OFFICE VISIT (OUTPATIENT)
Dept: CARDIOLOGY | Facility: CLINIC | Age: 65
End: 2025-04-30
Payer: COMMERCIAL

## 2025-04-30 VITALS
HEART RATE: 94 BPM | BODY MASS INDEX: 26.05 KG/M2 | OXYGEN SATURATION: 99 % | WEIGHT: 182 LBS | SYSTOLIC BLOOD PRESSURE: 145 MMHG | DIASTOLIC BLOOD PRESSURE: 68 MMHG | HEIGHT: 70 IN

## 2025-04-30 DIAGNOSIS — Z99.2 ESRD (END STAGE RENAL DISEASE) ON DIALYSIS: ICD-10-CM

## 2025-04-30 DIAGNOSIS — I10 ESSENTIAL HYPERTENSION: ICD-10-CM

## 2025-04-30 DIAGNOSIS — I50.9 CONGESTIVE HEART FAILURE, UNSPECIFIED HF CHRONICITY, UNSPECIFIED HEART FAILURE TYPE: ICD-10-CM

## 2025-04-30 DIAGNOSIS — I70.223 CRITICAL LIMB ISCHEMIA OF BOTH LOWER EXTREMITIES: Primary | ICD-10-CM

## 2025-04-30 DIAGNOSIS — I10 PRIMARY HYPERTENSION: ICD-10-CM

## 2025-04-30 DIAGNOSIS — I25.10 ATHEROSCLEROSIS OF NATIVE CORONARY ARTERY OF NATIVE HEART WITHOUT ANGINA PECTORIS: ICD-10-CM

## 2025-04-30 DIAGNOSIS — I48.19 PERSISTENT ATRIAL FIBRILLATION: ICD-10-CM

## 2025-04-30 DIAGNOSIS — I65.23 BILATERAL CAROTID ARTERY STENOSIS: ICD-10-CM

## 2025-04-30 DIAGNOSIS — Z95.828 S/P ARTERIOVENOUS (AV) GRAFT PLACEMENT: ICD-10-CM

## 2025-04-30 DIAGNOSIS — R07.9 CHEST PAIN, UNSPECIFIED TYPE: ICD-10-CM

## 2025-04-30 DIAGNOSIS — N18.6 ESRD (END STAGE RENAL DISEASE) ON DIALYSIS: ICD-10-CM

## 2025-04-30 PROCEDURE — 99215 OFFICE O/P EST HI 40 MIN: CPT | Mod: S$GLB,,, | Performed by: STUDENT IN AN ORGANIZED HEALTH CARE EDUCATION/TRAINING PROGRAM

## 2025-04-30 PROCEDURE — 1160F RVW MEDS BY RX/DR IN RCRD: CPT | Mod: CPTII,S$GLB,, | Performed by: STUDENT IN AN ORGANIZED HEALTH CARE EDUCATION/TRAINING PROGRAM

## 2025-04-30 PROCEDURE — 1159F MED LIST DOCD IN RCRD: CPT | Mod: CPTII,S$GLB,, | Performed by: STUDENT IN AN ORGANIZED HEALTH CARE EDUCATION/TRAINING PROGRAM

## 2025-04-30 PROCEDURE — 3008F BODY MASS INDEX DOCD: CPT | Mod: CPTII,S$GLB,, | Performed by: STUDENT IN AN ORGANIZED HEALTH CARE EDUCATION/TRAINING PROGRAM

## 2025-04-30 PROCEDURE — 3078F DIAST BP <80 MM HG: CPT | Mod: CPTII,S$GLB,, | Performed by: STUDENT IN AN ORGANIZED HEALTH CARE EDUCATION/TRAINING PROGRAM

## 2025-04-30 PROCEDURE — 99999 PR PBB SHADOW E&M-EST. PATIENT-LVL III: CPT | Mod: PBBFAC,,, | Performed by: STUDENT IN AN ORGANIZED HEALTH CARE EDUCATION/TRAINING PROGRAM

## 2025-04-30 PROCEDURE — 3077F SYST BP >= 140 MM HG: CPT | Mod: CPTII,S$GLB,, | Performed by: STUDENT IN AN ORGANIZED HEALTH CARE EDUCATION/TRAINING PROGRAM

## 2025-04-30 RX ORDER — EPLERENONE 50 MG/1
50 TABLET ORAL
COMMUNITY
Start: 2025-02-17

## 2025-04-30 RX ORDER — ISOSORBIDE MONONITRATE 30 MG/1
30 TABLET, EXTENDED RELEASE ORAL DAILY
COMMUNITY

## 2025-04-30 RX ORDER — RANOLAZINE 1000 MG/1
1000 TABLET, EXTENDED RELEASE ORAL 2 TIMES DAILY
Qty: 180 TABLET | Refills: 3 | Status: SHIPPED | OUTPATIENT
Start: 2025-04-30 | End: 2026-04-30

## 2025-04-30 RX ORDER — CARVEDILOL 12.5 MG/1
12.5 TABLET ORAL 2 TIMES DAILY WITH MEALS
Qty: 180 TABLET | Refills: 3 | Status: SHIPPED | OUTPATIENT
Start: 2025-04-30 | End: 2026-04-30

## 2025-04-30 RX ORDER — CLOPIDOGREL BISULFATE 75 MG/1
75 TABLET ORAL DAILY
Qty: 90 TABLET | Refills: 3 | Status: SHIPPED | OUTPATIENT
Start: 2025-04-30

## 2025-04-30 RX ORDER — HYDRALAZINE HYDROCHLORIDE 50 MG/1
50 TABLET, FILM COATED ORAL EVERY 12 HOURS
Qty: 180 TABLET | Refills: 3 | Status: SHIPPED | OUTPATIENT
Start: 2025-04-30 | End: 2026-04-30

## 2025-04-30 RX ORDER — HYDRALAZINE HYDROCHLORIDE 50 MG/1
TABLET, FILM COATED ORAL
COMMUNITY
Start: 2025-01-28 | End: 2025-04-30 | Stop reason: SDUPTHER

## 2025-04-30 NOTE — PROGRESS NOTES
Subjective:   @Patient ID:  Domingo Gross is a 64 y.o. male who presents for follow-up of No chief complaint on file.      HPI:     Domingo Gross is a pleasant 64 y.o. male established patient with me with Pmhx of HTN, CKD IV, PAD with multiple intervention, CAD (mid LAD/prox RCA PCI 3/2019 and prox LCA in 10/2019 following out of hospital cardiac arrest), after cardiac arrest in setting of prox LCX occlusion treated with PCI, 9/16/2024 s/p intervention w cutting/shockwave PTCA to Lcx presents  for hospital follow up. He had his typical cardiac chest pain with minimal troponin elevation,  ECG without changes given his history he was taken to the cath lab s/p PCI of LCx into OM. She states that he had no seizures activities during the case and BP was as usual but his BP med got discontinue except coreg. Patient is known vasculopath and likely BP cuff is not going to correlate. Voiced no cv complaints.     4/30/25  Here for follow up. Voiced no chest pain or SOB after last PCI. R>L claudication; similar symptoms as before with his prior PV intervention    Last PVI 2022   S/p right CFA IVUS guided atherectomy and PTA with DCB   5-6 fr slender sheath via right PT for JOSY approach    Echo   LM:  YADIRA III flow mild luminal irregularities  LAD: YADIRA III flow patent prox/mid stent with mild disease 30% proximal and mild disease in the mid segment 30-40%.  LCx:  YADIRA 0 flow.  100% Occluded proximal stent into OM 1 and into mid circ.  Prior bifurcation stent. Reduced to 0% post intervention  prox lcx into OM1.   RCA: YADIRA III flow patent stents prox and mid with mild disease distally 10-20%.  There is right-to-left collaterals from the RCA to distal and mid circ  Dominance codominant      Patient Active Problem List    Diagnosis Date Noted    CAD (coronary artery disease) 01/23/2025    Hypokalemia 01/23/2025    Unstable angina pectoris 01/23/2025    Elevated troponin 01/23/2025    Insomnia 12/16/2024    Traumatic  hematoma of right knee 11/12/2024    Hyperparathyroidism 11/05/2024    S/P drug eluting coronary stent placement 09/14/2024    Atherosclerosis of native coronary artery of native heart with unstable angina pectoris 09/14/2024    Acute congestive heart failure 09/13/2024    Pulmonary edema 09/09/2024    Anticoagulated 07/01/2024    Hemodialysis catheter dysfunction 05/16/2024    S/P arteriovenous (AV) graft placement 04/23/2024    Membranous nephropathy determined by biopsy 02/20/2024    Closed fracture of transverse process of lumbar vertebra 02/16/2024    ESRD (end stage renal disease) on dialysis 02/16/2024    Fall 02/16/2024    EBV infection 01/31/2024    CMV (cytomegalovirus infection) 01/31/2024    Paroxysmal atrial fibrillation 08/24/2023    Hypothyroidism 03/02/2023    Unstable angina 03/02/2023     I have messaged his cardiologist and electrophysiologist about this. Currently awaiting response       Anemia due to chronic kidney disease, on chronic dialysis 12/15/2021    COPD exacerbation 08/20/2021    Nuclear sclerosis, bilateral 06/08/2020    Nephrotic range proteinuria 04/06/2020    Bilateral carotid artery stenosis     Coronary artery disease 03/29/2019         3/29/2019    S/p LHC via R radial           LM, LAD, and LCX are patent with luminal irregularities  RCA mid 95% calcified lesion  Normal EF with LVEDP 8 mmHg           PCI of mid LAD with 2.5 x 30 and 2.5 x 15 mm Resolute REZA  PCI of proximal RCA to treat guide dissection with 3.5 x 22 Resolute REZA        10/11/2019 for cardiac arrest        S/p staged LCX PCI                    L CFA access              IVUS guided PCI              3.0 x 15 mm Resolute REZA post dilated with 3.5 NC balloon           Venous insufficiency of both lower extremities 06/04/2018    Atherosclerosis of native artery of both lower extremities with intermittent claudication 03/02/2018    Hyperlipidemia 06/12/2015    DJD (degenerative joint disease), lumbar 05/27/2015     Claudication in peripheral vascular disease 06/13/2014    PAD (peripheral artery disease) 05/21/2014 6/13/2014      1. Three vessel runoff below the knee bilaterally.  2. Severe disease of the terminal aorta.  3. Successful PTAS.  4. Bilateral common iliac reconstruction with 8 x 39 mm stent extending in the aorta  5. Right common illiac was treated with an overlapping 9 x 60 mm SES   6. Left common iliac was treated with an overlapping 8 x 80 mm SES down to external iliac  7. All stents were post dilated with 9.0 x 60 mm balloons  8. Moderate bilateral SFA disease  9. Chronically occluded left internal iliac artery        6/12/2015      1. 80% mid left SFA with a 20 mmHg resting mean gradient  2. Atherectomy with 2.2 [employer] Volcano catheter  3. PTA with 6.0 x 100 mm Lutonix drug coated balloon        6/9/2017      Patent bilateral GRUPO/EIA stents  L EIA PTAS 9.0 x 80 mm Life stent post dilated with 8.0 mm balloon  Bilateral 70-80% SFA stenosis with 3 vessel run off          3/2018      L SFA intervention for claudication   75% L SFA with 3 vessel run off   7 mmHg resting and 33 mmHg hyperemic mean gradient         L CFA antegrade access   PTA with 6.0 x 150 mm   PTA with 6.0 x 150 mm Lutonix   3 vessel run off           4/13/2018       S/p R SFA PTA for winsome IIb claudication   R CFA antegrade access         R CFA with 50% stenosis-heavily calcified lesion               Baseline /90         R SFA with 70% stenosis with a significant resting and hyperemic mean gradient     3 vessel run off             PTA of R SFA with 6.0 x 150 + 6.0 x 60 mm Lutonix DCB        5/2/2018   Patent arteries post revascularization        2/2019     R 0.84 to 0.27   L 0.90 to 0.66   After ambulation   Severe R calf claudication        Peripheral angiogram 5/10/2019                   95% R CFA stenosis; heavily calcified    Patent SFA, POP + 3 vessel run off      Peripheral intervention 7/12/2019    S/p right CFA  "revascularization for winsome IIb claudication       Assisted JOSY approach   L radial access for visualization   5-6 slender R PT access for delivery of therapy          Atherectomy with [place] catheter   PTA with 7.0 x 40 mm Lutonix DCB            HTN (hypertension) 04/29/2013                    LABS  CBC  @LABRCNTIP(WBC:3,RBC:3,HGB:3,HCT:3,PLT:3,MCV:3,MCH:3,MCHC:3)@  BMP  @LABRCNTIP(NA:3,K:3,CO2:3,CL:3,BUN:3,Creatinine:3,GLU:3,GFR:3)@    POCT-Glucose  No results found for: "POCTGLUCOSE"      @LABRCNTIP(Calcium:3,MG:3,PHOS:3)@  LFT  @LABRCNTIP(PROT:3,Albumin:3,,Bilitot:3,AST:3,Alkphos:3,ALT:3)@  GFR     COAGS  @LABRCNTIP(PT:3,INR:3,APTT:3)@  CE  @LABRCNTIP(troponini:3,cktotal:3,ckmb:3)@  ABGs  @[id number](PH,PCO2,PO2,HCO3,POCSATURATED,BE)@  BNP  @LABRCNTIP(BNP:3)@    LAST HbA1c  Lab Results   Component Value Date    HGBA1C 4.6 06/25/2024       Lipid panel  Lab Results   Component Value Date    CHOL 125 01/23/2025    CHOL 129 06/25/2024    CHOL 132 06/06/2024     Lab Results   Component Value Date    HDL 50 01/23/2025    HDL 63 06/25/2024    HDL 58 06/06/2024     Lab Results   Component Value Date    LDLCALC 59.8 (L) 01/23/2025    LDLCALC 55.2 (L) 06/25/2024    LDLCALC 56.0 (L) 06/06/2024     Lab Results   Component Value Date    TRIG 76 01/23/2025    TRIG 54 06/25/2024    TRIG 90 06/06/2024     Lab Results   Component Value Date    CHOLHDL 40.0 01/23/2025    CHOLHDL 48.8 06/25/2024    CHOLHDL 43.9 06/06/2024            Review of Systems   Cardiovascular:  Negative for chest pain.   All other systems reviewed and are negative.      Objective:   General: No acute stress  HENT: EOM intact, PERRL, oropharynx clear, mucous membranes clear and moist   Pulmonary: CTAB  Cardiovascular: regular rhythm, nl S1/S2, no murmurs, rubs or gallops  Abdomen: soft, non-tender, no distended no splenomegaly or hepatomegaly   Skin: warm, dry, no erythema, no rashes    Extremities: periph pulses intact, no cyanosis or edema "   Neuro: a/ox4, clear speech, follows commands, no weakness or focal neurologic  deficit   Psych: mood and behavior normal       Assessment:     1. Critical limb ischemia of both lower extremities    2. Congestive heart failure, unspecified HF chronicity, unspecified heart failure type    3. Primary hypertension    4. Essential hypertension    5. Atherosclerosis of native coronary artery of native heart without angina pectoris    6. Chest pain, unspecified type    7. S/P arteriovenous (AV) graft placement    8. ESRD (end stage renal disease) on dialysis    9. Bilateral carotid artery stenosis    10. Persistent atrial fibrillation [I48.19]          Plan:     CAD with multiple interventions - asymptomatic. Now s/p PCI lcx to OM. Optimized anti anginals. Continue Plavix + eliquis.   HTN- continue current medication.   Afib per EP- continue eliquis  AA- continue statin  PVD/CLTI - Typical claudication. Continue plavix and statin.   BL BRITT- continue statin  ESRD on HD    Continue with current medical plan and lifestyle changes.  Return sooner for concerns or questions. If symptoms persist go to the ED  I have reviewed all pertinent data on this patient       I have reviewed the patient's medical history in detail and updated the computerized patient record.    Orders Placed This Encounter   Procedures    CV Ultrasound doppler arterial legs bilat     Standing Status:   Future     Expiration Date:   4/30/2026     Release to patient:   Immediate    Echo     Standing Status:   Future     Expiration Date:   4/30/2026     Release to patient:   Immediate       Follow up as scheduled. Return sooner for concerns or questions            He expressed verbal understanding and agreed with the plan        Patient's Medications   New Prescriptions    No medications on file   Previous Medications    ALBUTEROL-IPRATROPIUM (DUO-NEB) 2.5 MG-0.5 MG/3 ML NEBULIZER SOLUTION    Use 1 vial by nebulization every 6 (six) hours as needed for  Wheezing or Shortness of Breath (or cough). Rescue    CEPHALEXIN (KEFLEX) 250 MG CAPSULE    Take 1 capsule (250 mg total) by mouth Daily.    COENZYME Q10 100 MG CAPSULE    Take 1 capsule (100 mg total) by mouth once daily.    EPLERENONE (INSPRA) 50 MG TAB    Take 50 mg by mouth.    HYDROXYZINE PAMOATE (VISTARIL) 25 MG CAP    Take 1 capsule (25 mg total) by mouth nightly as needed (Sleep).    ISOSORBIDE MONONITRATE (IMDUR) 30 MG 24 HR TABLET    Take 30 mg by mouth once daily.    LEVOTHYROXINE (SYNTHROID) 75 MCG TABLET    Take 1 tablet (75 mcg total) by mouth before breakfast.    MOLNUPIRAVIR (LAGEVRIO, EUA, ORAL)    Take by mouth.    NITROGLYCERIN (NITROSTAT) 0.4 MG SL TABLET    Place 1 tablet (0.4 mg total) under the tongue every 5 (five) minutes as needed for Chest pain (for a max of 3 tabs in 15 minutes).    ROSUVASTATIN (CRESTOR) 40 MG TAB    Take 1 tablet (40 mg total) by mouth every evening.   Modified Medications    Modified Medication Previous Medication    APIXABAN (ELIQUIS) 5 MG TAB apixaban (ELIQUIS) 5 mg Tab       Take 1 tablet (5 mg total) by mouth 2 (two) times daily.    Take 1 tablet (5 mg total) by mouth 2 (two) times daily.    CARVEDILOL (COREG) 12.5 MG TABLET carvediloL (COREG) 12.5 MG tablet       Take 1 tablet (12.5 mg total) by mouth 2 (two) times daily with meals.    Take 1 tablet (12.5 mg total) by mouth 2 (two) times daily with meals.    CLOPIDOGREL (PLAVIX) 75 MG TABLET clopidogreL (PLAVIX) 75 mg tablet       Take 1 tablet (75 mg total) by mouth once daily.    Take 1 tablet (75 mg total) by mouth once daily.    HYDRALAZINE (APRESOLINE) 50 MG TABLET hydrALAZINE (APRESOLINE) 50 MG tablet       Take 1 tablet (50 mg total) by mouth every 12 (twelve) hours.    Take by mouth.    RANOLAZINE (RANEXA) 1,000 MG TB12 ranolazine (RANEXA) 1,000 mg Tb12       Take 1 tablet (1,000 mg total) by mouth 2 (two) times daily.    Take 1 tablet (1,000 mg total) by mouth 2 (two) times daily.   Discontinued  Medications    FUROSEMIDE (LASIX) 80 MG TABLET    Take 1 tablet (80 mg total) by mouth every other day. Non dialysis days only   Monday, Wednesday, Friday , Sunday        Enoch Salazar MD  Cardiovascular Disease [facility]

## 2025-05-26 ENCOUNTER — TELEPHONE (OUTPATIENT)
Dept: CARDIOLOGY | Facility: CLINIC | Age: 65
End: 2025-05-26
Payer: COMMERCIAL

## 2025-05-27 ENCOUNTER — OFFICE VISIT (OUTPATIENT)
Dept: FAMILY MEDICINE | Facility: CLINIC | Age: 65
End: 2025-05-27
Payer: COMMERCIAL

## 2025-05-27 VITALS
BODY MASS INDEX: 25.85 KG/M2 | SYSTOLIC BLOOD PRESSURE: 138 MMHG | HEART RATE: 68 BPM | WEIGHT: 180.56 LBS | TEMPERATURE: 98 F | HEIGHT: 70 IN | OXYGEN SATURATION: 97 % | DIASTOLIC BLOOD PRESSURE: 64 MMHG

## 2025-05-27 DIAGNOSIS — J02.9 VIRAL PHARYNGITIS: Primary | ICD-10-CM

## 2025-05-27 DIAGNOSIS — J02.9 SORE THROAT: ICD-10-CM

## 2025-05-27 LAB
CTP QC/QA: YES
MOLECULAR STREP A: NEGATIVE
POC MOLECULAR INFLUENZA A AGN: NEGATIVE
POC MOLECULAR INFLUENZA B AGN: NEGATIVE
SARS-COV-2 RDRP RESP QL NAA+PROBE: NEGATIVE

## 2025-05-27 PROCEDURE — 3078F DIAST BP <80 MM HG: CPT | Mod: CPTII,S$GLB,,

## 2025-05-27 PROCEDURE — 1160F RVW MEDS BY RX/DR IN RCRD: CPT | Mod: CPTII,S$GLB,,

## 2025-05-27 PROCEDURE — 3075F SYST BP GE 130 - 139MM HG: CPT | Mod: CPTII,S$GLB,,

## 2025-05-27 PROCEDURE — 99999 PR PBB SHADOW E&M-EST. PATIENT-LVL V: CPT | Mod: PBBFAC,,,

## 2025-05-27 PROCEDURE — 99214 OFFICE O/P EST MOD 30 MIN: CPT | Mod: S$GLB,,,

## 2025-05-27 PROCEDURE — 1159F MED LIST DOCD IN RCRD: CPT | Mod: CPTII,S$GLB,,

## 2025-05-27 PROCEDURE — 87651 STREP A DNA AMP PROBE: CPT | Mod: QW,S$GLB,,

## 2025-05-27 PROCEDURE — 87635 SARS-COV-2 COVID-19 AMP PRB: CPT | Mod: QW,S$GLB,,

## 2025-05-27 PROCEDURE — 87502 INFLUENZA DNA AMP PROBE: CPT | Mod: QW,S$GLB,,

## 2025-05-27 PROCEDURE — 3008F BODY MASS INDEX DOCD: CPT | Mod: CPTII,S$GLB,,

## 2025-05-27 NOTE — PROGRESS NOTES
[facility]    5/27/2025      Subjective:       Patient ID:  Taran is a 64 y.o. male .  has a past medical history of Acute congestive heart failure, Allergy, Anemia, Anticoagulant long-term use, Arthritis, CKD (chronic kidney disease) stage 4, GFR 15-29 ml/min, COPD (chronic obstructive pulmonary disease), Coronary artery disease, Heart attack, Hematuria, Hemothorax, Hyperlipidemia, Hypertension, Hyperuricemia, Hypocalcemia, Hypokalemia, Hypophosphatemia, Hypothyroidism, PAD (peripheral artery disease), Proteinuria, and Vitamin D deficiency.    History of Present Illness    CHIEF COMPLAINT:  Domingo presents today with sore throat    HISTORY OF PRESENT ILLNESS:  He developed sore throat two days ago on Sunday with pain when swallowing both solids and liquids. He denies fever, chills, runny nose, or cough. He has no sick contacts at home and no recent travel history. He has been using cough drops with minimal symptom relief. Denies excessive drooling and shortness of breath.       ROS:  Constitutional: -chills, -fever  Respiratory: -cough, -shortness of breath  Cardiovascular: -chest pain  Gastrointestinal: -abdominal pain, -constipation, -diarrhea, -nausea, -vomiting  Neurological: -dizziness, -lightheadedness, -headaches  Throat: +sore throat, +difficulty swallowing            Problem List[1]      Last HgbA1C:    Lab Results   Component Value Date    HGBA1C 4.6 06/25/2024    HGBA1C 4.6 06/06/2024    HGBA1C 5.2 02/27/2023         Last Lipid Panel:    Lab Results   Component Value Date    HDL 50 01/23/2025    HDL 63 06/25/2024    HDL 58 06/06/2024       Lab Results   Component Value Date    LDLCALC 59.8 (L) 01/23/2025    LDLCALC 55.2 (L) 06/25/2024    LDLCALC 56.0 (L) 06/06/2024       Lab Results   Component Value Date    TRIG 76 01/23/2025    TRIG 54 06/25/2024    TRIG 90 06/06/2024       Lab Results   Component Value Date    CHOLHDL 40.0 01/23/2025    CHOLHDL 48.8 06/25/2024     "CHOLHDL 43.9 06/06/2024         Review of patient's allergies indicates:   Allergen Reactions    Ace inhibitors Rash        Medication List with Changes/Refills   Current Medications    ALBUTEROL-IPRATROPIUM (DUO-NEB) 2.5 MG-0.5 MG/3 ML NEBULIZER SOLUTION    Use 1 vial by nebulization every 6 (six) hours as needed for Wheezing or Shortness of Breath (or cough). Rescue    APIXABAN (ELIQUIS) 5 MG TAB    Take 1 tablet (5 mg total) by mouth 2 (two) times daily.    CARVEDILOL (COREG) 12.5 MG TABLET    Take 1 tablet (12.5 mg total) by mouth 2 (two) times daily with meals.    CEPHALEXIN (KEFLEX) 250 MG CAPSULE    Take 1 capsule (250 mg total) by mouth Daily.    CLOPIDOGREL (PLAVIX) 75 MG TABLET    Take 1 tablet (75 mg total) by mouth once daily.    COENZYME Q10 100 MG CAPSULE    Take 1 capsule (100 mg total) by mouth once daily.    EPLERENONE (INSPRA) 50 MG TAB    Take 50 mg by mouth.    HYDRALAZINE (APRESOLINE) 50 MG TABLET    Take 1 tablet (50 mg total) by mouth every 12 (twelve) hours.    HYDROXYZINE PAMOATE (VISTARIL) 25 MG CAP    Take 1 capsule (25 mg total) by mouth nightly as needed (Sleep).    ISOSORBIDE MONONITRATE (IMDUR) 30 MG 24 HR TABLET    Take 30 mg by mouth once daily.    LEVOTHYROXINE (SYNTHROID) 75 MCG TABLET    Take 1 tablet (75 mcg total) by mouth before breakfast.    MOLNUPIRAVIR (LAGEVRIO, EUA, ORAL)    Take by mouth.    NITROGLYCERIN (NITROSTAT) 0.4 MG SL TABLET    Place 1 tablet (0.4 mg total) under the tongue every 5 (five) minutes as needed for Chest pain (for a max of 3 tabs in 15 minutes).    RANOLAZINE (RANEXA) 1,000 MG TB12    Take 1 tablet (1,000 mg total) by mouth 2 (two) times daily.    ROSUVASTATIN (CRESTOR) 40 MG TAB    Take 1 tablet (40 mg total) by mouth every evening.               Objective:      /64 (BP Location: Right arm, Patient Position: Sitting)   Pulse 68   Temp 97.9 °F (36.6 °C)   Ht 5' 10" (1.778 m)   Wt 81.9 kg (180 lb 8.9 oz)   SpO2 97%   BMI 25.91 kg/m² " "  Estimated body mass index is 25.91 kg/m² as calculated from the following:    Height as of this encounter: 5' 10" (1.778 m).    Weight as of this encounter: 81.9 kg (180 lb 8.9 oz).    Physical Exam  Vitals and nursing note reviewed.   Constitutional:       General: He is not in acute distress.     Appearance: Normal appearance.   HENT:      Head: Normocephalic and atraumatic.      Mouth/Throat:      Mouth: Mucous membranes are moist.      Pharynx: No oropharyngeal exudate or posterior oropharyngeal erythema.   Eyes:      Conjunctiva/sclera: Conjunctivae normal.   Cardiovascular:      Rate and Rhythm: Normal rate and regular rhythm.   Pulmonary:      Effort: Pulmonary effort is normal. No respiratory distress.   Neurological:      Mental Status: He is alert.   Psychiatric:         Mood and Affect: Mood normal.         Behavior: Behavior normal.             Assessment and Plan:   1. Viral pharyngitis    2. Sore throat  - POCT COVID-19 Rapid Screening  - POCT Influenza A/B Molecular  - POCT Strep A, Molecular     Assessment & Plan    IMPRESSION:  - Negative results for flu, COVID, and strep tests.  - Likely onset of viral infection based on symptoms and negative test results.  - Symptomatic treatment approach appropriate given viral etiology.    PLAN SUMMARY:  - Symptomatic treatment recommended: throat lozenges, sore throat spray, OTC analgesics (acetaminophen or ibuprofen)  - Advised warm beverages with honey and adequate hydration  - Contact office immediately if experiencing dyspnea or sensation of throat closing up  - Follow-up in 7-10 days if symptoms persist    VIRAL INFECTION / ACUTE PHARYNGITIS:  - Monitored the patient's sore throat for a few days with pain when swallowing both solids and liquids.  - No fever, chills, rhinorrhea, or cough reported.  - Examination revealed throat without erythema or purulent exudates, tonsils were not hypertrophied, and strep test was negative.  - Diagnosed acute " pharyngitis likely due to viral infection based on presenting symptoms and negative strep test.  - Recommend symptomatic treatment including throat lozenges or sore throat spray, OTC analgesics (acetaminophen or ibuprofen) for pain and discomfort, warm beverages with honey to coat the throat, and maintaining adequate hydration.  - Explained typical progression of viral infections, usually lasting 7-10 days with potential improvement around the 1-week phylicia.  - Instructed the patient to contact the office immediately if experiencing dyspnea or sensation of throat closing up, which would require emergency evaluation.    FOLLOW-UP:  - Advised to follow up in 7-10 days if symptoms persist.         The patient was informed of the following statements     Emergency Care:Seek immediate medical attention in the emergency room if you experience any new or worsening symptoms, or if your current condition significantly changes or becomes more severe.  Patient Acknowledgment: Patient verbalizes understanding of the plan and agrees to proceed with the recommended care.    Follow Up:  6/4/2025 Geeta Coffey MD   Future Appointments   Date Time Provider Department Center   5/27/2025  2:30 PM Victorina Lechuga PA-C [facility]   6/3/2025  2:00 PM PREETI VASCULAR ULTRASOUND [facility]   6/3/2025  3:00 PM CARDIOLOGY, ECHO [facility]   6/3/2025  4:20 PM Enoch Tirado MD [facility] CARDIO [facility]   6/4/2025  3:20 PM Geeta Coffey MD [facility]                     No follow-ups on file.    Other Orders Placed This Visit:  Orders Placed This Encounter   Procedures    POCT COVID-19 Rapid Screening    POCT Influenza A/B Molecular    POCT Strep A, Molecular         This note was generated with the assistance of ambient listening technology. Verbal consent was obtained by the patient and accompanying visitor(s) for the recording of patient appointment to facilitate this note. I  attest to having reviewed and edited the generated note for accuracy, though some syntax or spelling errors may persist. Please contact the author of this note for any clarification.        Victorina Lechuga PA-C           [1]   Patient Active Problem List  Diagnosis    HTN (hypertension)    PAD (peripheral artery disease)    Claudication in peripheral vascular disease    DJD (degenerative joint disease), lumbar    Hyperlipidemia    Atherosclerosis of native artery of both lower extremities with intermittent claudication    Venous insufficiency of both lower extremities    Coronary artery disease    Bilateral carotid artery stenosis    Nephrotic range proteinuria    Nuclear sclerosis, bilateral    COPD exacerbation    Anemia due to chronic kidney disease, on chronic dialysis    Hypothyroidism    Unstable angina    Paroxysmal atrial fibrillation    EBV infection    CMV (cytomegalovirus infection)    Closed fracture of transverse process of lumbar vertebra    ESRD (end stage renal disease) on dialysis    Fall    Membranous nephropathy determined by biopsy    S/P arteriovenous (AV) graft placement    Hemodialysis catheter dysfunction    Anticoagulated    Pulmonary edema    Acute congestive heart failure    S/P drug eluting coronary stent placement    Atherosclerosis of native coronary artery of native heart with unstable angina pectoris    Hyperparathyroidism    Traumatic hematoma of right knee    Insomnia    CAD (coronary artery disease)    Hypokalemia    Unstable angina pectoris    Elevated troponin

## 2025-06-03 ENCOUNTER — OFFICE VISIT (OUTPATIENT)
Dept: CARDIOLOGY | Facility: CLINIC | Age: 65
End: 2025-06-03
Payer: COMMERCIAL

## 2025-06-03 ENCOUNTER — HOSPITAL ENCOUNTER (OUTPATIENT)
Dept: CARDIOLOGY | Facility: HOSPITAL | Age: 65
Discharge: HOME | End: 2025-06-03
Attending: STUDENT IN AN ORGANIZED HEALTH CARE EDUCATION/TRAINING PROGRAM
Payer: COMMERCIAL

## 2025-06-03 VITALS
WEIGHT: 180 LBS | HEIGHT: 70 IN | DIASTOLIC BLOOD PRESSURE: 71 MMHG | WEIGHT: 180.56 LBS | SYSTOLIC BLOOD PRESSURE: 134 MMHG | OXYGEN SATURATION: 99 % | BODY MASS INDEX: 25.85 KG/M2 | BODY MASS INDEX: 25.77 KG/M2 | HEIGHT: 70 IN

## 2025-06-03 DIAGNOSIS — I25.10 ATHEROSCLEROSIS OF NATIVE CORONARY ARTERY OF NATIVE HEART WITHOUT ANGINA PECTORIS: ICD-10-CM

## 2025-06-03 DIAGNOSIS — I25.119 ATHEROSCLEROSIS OF NATIVE CORONARY ARTERY OF NATIVE HEART WITH ANGINA PECTORIS: Primary | ICD-10-CM

## 2025-06-03 DIAGNOSIS — N18.6 ESRD (END STAGE RENAL DISEASE) ON DIALYSIS: ICD-10-CM

## 2025-06-03 DIAGNOSIS — I73.9 CLAUDICATION IN PERIPHERAL VASCULAR DISEASE: ICD-10-CM

## 2025-06-03 DIAGNOSIS — Z99.2 ESRD (END STAGE RENAL DISEASE) ON DIALYSIS: ICD-10-CM

## 2025-06-03 DIAGNOSIS — I70.223 CRITICAL LIMB ISCHEMIA OF BOTH LOWER EXTREMITIES: ICD-10-CM

## 2025-06-03 DIAGNOSIS — I65.23 BILATERAL CAROTID ARTERY STENOSIS: ICD-10-CM

## 2025-06-03 DIAGNOSIS — I70.213 ATHEROSCLEROSIS OF NATIVE ARTERY OF BOTH LOWER EXTREMITIES WITH INTERMITTENT CLAUDICATION: ICD-10-CM

## 2025-06-03 DIAGNOSIS — I10 ESSENTIAL HYPERTENSION: ICD-10-CM

## 2025-06-03 DIAGNOSIS — I70.212 ATHEROSCLEROSIS OF NATIVE ARTERY OF LEFT LOWER EXTREMITY WITH INTERMITTENT CLAUDICATION: ICD-10-CM

## 2025-06-03 DIAGNOSIS — I10 PRIMARY HYPERTENSION: ICD-10-CM

## 2025-06-03 LAB
AORTIC SIZE INDEX (SOV): 2 CM/M2
AORTIC SIZE INDEX: 1.7 CM/M2
APICAL FOUR CHAMBER EJECTION FRACTION: 41 %
APICAL TWO CHAMBER EJECTION FRACTION: 22 %
ASCENDING AORTA: 3.4 CM
AV INDEX (PROSTH): 0.8
AV MEAN GRADIENT: 3 MMHG
AV PEAK GRADIENT: 7 MMHG
AV VALVE AREA BY VELOCITY RATIO: 3.5 CM²
AV VALVE AREA: 3.3 CM²
AV VELOCITY RATIO: 0.85
BSA FOR ECHO PROCEDURE: 2.01 M2
CV ECHO LV RWT: 0.31 CM
DOP CALC AO PEAK VEL: 1.3 M/S
DOP CALC AO VTI: 31.8 CM
DOP CALC LVOT AREA: 4.2 CM2
DOP CALC LVOT DIAMETER: 2.3 CM
DOP CALC LVOT PEAK VEL: 1.1 M/S
DOP CALC LVOT STROKE VOLUME: 105.5 CM3
DOP CALC MV VTI: 39.3 CM
DOP CALCLVOT PEAK VEL VTI: 25.4 CM
E WAVE DECELERATION TIME: 176 MSEC
E/A RATIO: 1.53
E/E' RATIO: 16 M/S
ECHO LV POSTERIOR WALL: 0.9 CM (ref 0.6–1.1)
FRACTIONAL SHORTENING: 18.6 % (ref 28–44)
INTERVENTRICULAR SEPTUM: 1 CM (ref 0.6–1.1)
LEFT ATRIUM AREA SYSTOLIC (APICAL 2 CHAMBER): 25.27 CM2
LEFT ATRIUM AREA SYSTOLIC (APICAL 4 CHAMBER): 24.77 CM2
LEFT ATRIUM VOLUME INDEX MOD: 40 ML/M2
LEFT ATRIUM VOLUME MOD: 79 ML
LEFT INTERNAL DIMENSION IN SYSTOLE: 4.8 CM (ref 2.1–4)
LEFT PERONEAL SYS PSV: 29 CM/S
LEFT POST TIBIAL SYS PSV: 45 CM/S
LEFT PROFUNDA SYS PSV: 150 CM/S
LEFT SUPER FEMORAL DIST SYS PSV: 70 CM/S
LEFT SUPER FEMORAL MID SYS PSV: 144 CM/S
LEFT SUPER FEMORAL PROX SYS PSV: 149 CM/S
LEFT TIB/PER TRUNK SYS PSV: 71 CM/S
LEFT VENTRICLE DIASTOLIC VOLUME INDEX: 87 ML/M2
LEFT VENTRICLE DIASTOLIC VOLUME: 174 ML
LEFT VENTRICLE END DIASTOLIC VOLUME APICAL 2 CHAMBER: 119.26 ML
LEFT VENTRICLE END DIASTOLIC VOLUME APICAL 4 CHAMBER: 151.82 ML
LEFT VENTRICLE END SYSTOLIC VOLUME APICAL 2 CHAMBER: 81.09 ML
LEFT VENTRICLE END SYSTOLIC VOLUME APICAL 4 CHAMBER: 76.25 ML
LEFT VENTRICLE MASS INDEX: 112.3 G/M2
LEFT VENTRICLE SYSTOLIC VOLUME INDEX: 53 ML/M2
LEFT VENTRICLE SYSTOLIC VOLUME: 106 ML
LEFT VENTRICULAR INTERNAL DIMENSION IN DIASTOLE: 5.9 CM (ref 3.5–6)
LEFT VENTRICULAR MASS: 224.6 G
LV LATERAL E/E' RATIO: 12.2 M/S
LV SEPTAL E/E' RATIO: 22 M/S
LVED V (TEICH): 173.78 ML
LVES V (TEICH): 105.62 ML
LVOT MG: 2.36 MMHG
LVOT MV: 0.73 CM/S
MV PEAK A VEL: 0.72 M/S
MV PEAK E VEL: 1.1 M/S
MV PEAK GRADIENT: 5 MMHG
MV STENOSIS PRESSURE HALF TIME: 50.95 MS
MV VALVE AREA BY CONTINUITY EQUATION: 2.68 CM2
MV VALVE AREA P 1/2 METHOD: 4.32 CM2
OHS CV RV/LV RATIO: 0.78 CM
OHS LV EJECTION FRACTION SIMPSONS BIPLANE MOD: 35 %
PISA MRMAX VEL: 5.34 M/S
PISA TR MAX VEL: 2.9 M/S
PULM VEIN S/D RATIO: 0.47
PV PEAK D VEL: 0.59 M/S
PV PEAK S VEL: 0.28 M/S
RA PRESSURE ESTIMATED: 3 MMHG
RA VOL SYS: 51.19 ML
RIGHT ANT TIBIAL SYS PSV: 90 CM/S
RIGHT ATRIAL AREA: 16.9 CM2
RIGHT ATRIUM END SYSTOLIC VOLUME APICAL 4 CHAMBER INDEX BSA: 24.27 ML/M2
RIGHT ATRIUM VOLUME AREA LENGTH APICAL 4 CHAMBER: 48.54 ML
RIGHT CFA PSV: 124 CM/S
RIGHT DORSALIS PEDIS PSV: 69 CM/S
RIGHT PERONEAL SYS PSV: 31 CM/S
RIGHT POST TIBIAL SYS PSV: 52 CM/S
RIGHT PROFUNDA SYS PSV: 142 CM/S
RIGHT SUPER FEMORAL DIST SYS PSV: 76 CM/S
RIGHT SUPER FEMORAL MID SYS PSV: 101 CM/S
RIGHT SUPER FEMORAL PROX SYS PSV: 242 CM/S
RIGHT TIB/PER TRUNK SYS PSV: 91 CM/S
RIGHT VENTRICLE DIASTOLIC BASEL DIMENSION: 4.6 CM
RV TB RVSP: 6 MMHG
RV TISSUE DOPPLER FREE WALL SYSTOLIC VELOCITY 1 (APICAL 4 CHAMBER VIEW): 13.3 CM/S
SINUS: 3.94 CM
STJ: 3.3 CM
TDI LATERAL: 0.09 M/S
TDI SEPTAL: 0.05 M/S
TDI: 0.07 M/S
TR MAX PG: 34 MMHG
TRICUSPID ANNULAR PLANE SYSTOLIC EXCURSION: 2.1 CM
TV REST PULMONARY ARTERY PRESSURE: 37 MMHG
Z-SCORE OF LEFT VENTRICULAR DIMENSION IN END DIASTOLE: 0.12
Z-SCORE OF LEFT VENTRICULAR DIMENSION IN END SYSTOLE: 2.32

## 2025-06-03 PROCEDURE — 93925 LOWER EXTREMITY STUDY: CPT

## 2025-06-03 PROCEDURE — 99999 PR PBB SHADOW E&M-EST. PATIENT-LVL II: CPT | Mod: PBBFAC,,, | Performed by: STUDENT IN AN ORGANIZED HEALTH CARE EDUCATION/TRAINING PROGRAM

## 2025-06-03 PROCEDURE — 93306 TTE W/DOPPLER COMPLETE: CPT | Mod: 26,,, | Performed by: INTERNAL MEDICINE

## 2025-06-03 PROCEDURE — 93925 LOWER EXTREMITY STUDY: CPT | Mod: 26,,, | Performed by: INTERNAL MEDICINE

## 2025-06-03 PROCEDURE — 93306 TTE W/DOPPLER COMPLETE: CPT

## 2025-06-03 RX ORDER — SODIUM CHLORIDE 0.9 % (FLUSH) 0.9 %
10 SYRINGE (ML) INJECTION
Status: SHIPPED | OUTPATIENT
Start: 2025-06-03

## 2025-06-03 RX ORDER — DIPHENHYDRAMINE HCL 50 MG
50 CAPSULE ORAL ONCE
OUTPATIENT
Start: 2025-06-03 | End: 2025-06-03

## 2025-06-03 RX ORDER — FUROSEMIDE 80 MG/1
TABLET ORAL
COMMUNITY
Start: 2025-05-16 | End: 2025-06-04 | Stop reason: ALTCHOICE

## 2025-06-04 ENCOUNTER — LAB VISIT (OUTPATIENT)
Dept: LAB | Facility: HOSPITAL | Age: 65
End: 2025-06-04
Attending: FAMILY MEDICINE
Payer: COMMERCIAL

## 2025-06-04 ENCOUNTER — OFFICE VISIT (OUTPATIENT)
Dept: FAMILY MEDICINE | Facility: CLINIC | Age: 65
End: 2025-06-04
Payer: COMMERCIAL

## 2025-06-04 VITALS
SYSTOLIC BLOOD PRESSURE: 128 MMHG | DIASTOLIC BLOOD PRESSURE: 78 MMHG | OXYGEN SATURATION: 98 % | BODY MASS INDEX: 25.66 KG/M2 | HEART RATE: 71 BPM | HEIGHT: 70 IN | WEIGHT: 179.25 LBS

## 2025-06-04 DIAGNOSIS — N18.6 ANEMIA DUE TO CHRONIC KIDNEY DISEASE, ON CHRONIC DIALYSIS: ICD-10-CM

## 2025-06-04 DIAGNOSIS — I10 PRIMARY HYPERTENSION: Primary | ICD-10-CM

## 2025-06-04 DIAGNOSIS — D63.1 ANEMIA DUE TO CHRONIC KIDNEY DISEASE, ON CHRONIC DIALYSIS: ICD-10-CM

## 2025-06-04 DIAGNOSIS — Z99.2 ESRD (END STAGE RENAL DISEASE) ON DIALYSIS: ICD-10-CM

## 2025-06-04 DIAGNOSIS — N18.6 ESRD (END STAGE RENAL DISEASE) ON DIALYSIS: ICD-10-CM

## 2025-06-04 DIAGNOSIS — E21.3 HYPERPARATHYROIDISM: ICD-10-CM

## 2025-06-04 DIAGNOSIS — D69.6 THROMBOCYTOPENIA, UNSPECIFIED: ICD-10-CM

## 2025-06-04 DIAGNOSIS — E03.9 HYPOTHYROIDISM, UNSPECIFIED TYPE: ICD-10-CM

## 2025-06-04 DIAGNOSIS — I25.110 ATHEROSCLEROSIS OF NATIVE CORONARY ARTERY OF NATIVE HEART WITH UNSTABLE ANGINA PECTORIS: ICD-10-CM

## 2025-06-04 DIAGNOSIS — I48.0 PAROXYSMAL ATRIAL FIBRILLATION: ICD-10-CM

## 2025-06-04 DIAGNOSIS — I10 PRIMARY HYPERTENSION: ICD-10-CM

## 2025-06-04 DIAGNOSIS — F51.01 PRIMARY INSOMNIA: ICD-10-CM

## 2025-06-04 DIAGNOSIS — E78.5 HYPERLIPIDEMIA, UNSPECIFIED HYPERLIPIDEMIA TYPE: ICD-10-CM

## 2025-06-04 DIAGNOSIS — E87.6 HYPOKALEMIA: ICD-10-CM

## 2025-06-04 DIAGNOSIS — Z99.2 ANEMIA DUE TO CHRONIC KIDNEY DISEASE, ON CHRONIC DIALYSIS: ICD-10-CM

## 2025-06-04 DIAGNOSIS — Z79.01 CHRONIC ANTICOAGULATION: ICD-10-CM

## 2025-06-04 DIAGNOSIS — J44.9 CHRONIC OBSTRUCTIVE PULMONARY DISEASE, UNSPECIFIED COPD TYPE: ICD-10-CM

## 2025-06-04 PROBLEM — J81.1 PULMONARY EDEMA: Status: RESOLVED | Noted: 2024-01-01 | Resolved: 2025-01-01
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
## 2025-06-04 PROBLEM — S32.009A CLOSED FRACTURE OF TRANSVERSE PROCESS OF LUMBAR VERTEBRA: Status: RESOLVED | Noted: 2024-02-16 | Resolved: 2025-06-04

## 2025-06-04 PROBLEM — B25.9 CMV (CYTOMEGALOVIRUS INFECTION): Status: RESOLVED | Noted: 2024-01-31 | Resolved: 2025-06-04

## 2025-06-04 PROBLEM — B27.90 EBV INFECTION: Status: RESOLVED | Noted: 2024-01-31 | Resolved: 2025-06-04

## 2025-06-04 PROBLEM — S80.01XA TRAUMATIC HEMATOMA OF RIGHT KNEE: Status: RESOLVED | Noted: 2024-01-01 | Resolved: 2025-01-01

## 2025-06-04 PROBLEM — I20.0 UNSTABLE ANGINA PECTORIS: Status: ACTIVE | Noted: 2023-03-02

## 2025-06-04 LAB
ABSOLUTE EOSINOPHIL (OHS): 0.22 K/UL
ABSOLUTE MONOCYTE (OHS): 0.64 K/UL (ref 0.3–1)
ABSOLUTE NEUTROPHIL COUNT (OHS): 3.91 K/UL (ref 1.8–7.7)
ALBUMIN SERPL BCP-MCNC: 3.2 G/DL (ref 3.5–5.2)
ALP SERPL-CCNC: 66 UNIT/L (ref 40–150)
ALT SERPL W/O P-5'-P-CCNC: 7 UNIT/L (ref 10–44)
ANION GAP (OHS): 20 MMOL/L (ref 8–16)
AST SERPL-CCNC: 16 UNIT/L (ref 11–45)
BASOPHILS # BLD AUTO: 0.04 K/UL
BASOPHILS NFR BLD AUTO: 0.7 %
BILIRUB SERPL-MCNC: 0.4 MG/DL (ref 0.1–1)
BUN SERPL-MCNC: 63 MG/DL (ref 8–23)
CALCIUM SERPL-MCNC: 7 MG/DL (ref 8.7–10.5)
CHLORIDE SERPL-SCNC: 97 MMOL/L (ref 95–110)
CO2 SERPL-SCNC: 21 MMOL/L (ref 23–29)
CREAT SERPL-MCNC: 6.3 MG/DL (ref 0.5–1.4)
EAG (OHS): 103 MG/DL (ref 68–131)
ERYTHROCYTE [DISTWIDTH] IN BLOOD BY AUTOMATED COUNT: 16.3 % (ref 11.5–14.5)
GFR SERPLBLD CREATININE-BSD FMLA CKD-EPI: 9 ML/MIN/1.73/M2
GLUCOSE SERPL-MCNC: 74 MG/DL (ref 70–110)
HBA1C MFR BLD: 5.2 % (ref 4–5.6)
HCT VFR BLD AUTO: 34.8 % (ref 40–54)
HGB BLD-MCNC: 11 GM/DL (ref 14–18)
IMM GRANULOCYTES # BLD AUTO: 0.02 K/UL (ref 0–0.04)
IMM GRANULOCYTES NFR BLD AUTO: 0.3 % (ref 0–0.5)
LYMPHOCYTES # BLD AUTO: 1.27 K/UL (ref 1–4.8)
MCH RBC QN AUTO: 26.9 PG (ref 27–31)
MCHC RBC AUTO-ENTMCNC: 31.6 G/DL (ref 32–36)
MCV RBC AUTO: 85 FL (ref 82–98)
NUCLEATED RBC (/100WBC) (OHS): 0 /100 WBC
PLATELET # BLD AUTO: 168 K/UL (ref 150–450)
PMV BLD AUTO: 10.5 FL (ref 9.2–12.9)
POTASSIUM SERPL-SCNC: 2.9 MMOL/L (ref 3.5–5.1)
PROT SERPL-MCNC: 7.1 GM/DL (ref 6–8.4)
PTH-INTACT SERPL-MCNC: 484.8 PG/ML (ref 9–77)
RBC # BLD AUTO: 4.09 M/UL (ref 4.6–6.2)
RELATIVE EOSINOPHIL (OHS): 3.6 %
RELATIVE LYMPHOCYTE (OHS): 20.8 % (ref 18–48)
RELATIVE MONOCYTE (OHS): 10.5 % (ref 4–15)
RELATIVE NEUTROPHIL (OHS): 64.1 % (ref 38–73)
SODIUM SERPL-SCNC: 138 MMOL/L (ref 136–145)
T4 FREE SERPL-MCNC: 0.72 NG/DL (ref 0.71–1.51)
TSH SERPL-ACNC: 29.41 UIU/ML (ref 0.4–4)
WBC # BLD AUTO: 6.1 K/UL (ref 3.9–12.7)

## 2025-06-04 PROCEDURE — 3008F BODY MASS INDEX DOCD: CPT | Mod: CPTII,S$GLB,, | Performed by: FAMILY MEDICINE

## 2025-06-04 PROCEDURE — 36415 COLL VENOUS BLD VENIPUNCTURE: CPT | Mod: PO

## 2025-06-04 PROCEDURE — 3044F HG A1C LEVEL LT 7.0%: CPT | Mod: CPTII,S$GLB,, | Performed by: FAMILY MEDICINE

## 2025-06-04 PROCEDURE — 99999 PR PBB SHADOW E&M-EST. PATIENT-LVL III: CPT | Mod: PBBFAC,,, | Performed by: FAMILY MEDICINE

## 2025-06-04 PROCEDURE — 83970 ASSAY OF PARATHORMONE: CPT

## 2025-06-04 PROCEDURE — 3078F DIAST BP <80 MM HG: CPT | Mod: CPTII,S$GLB,, | Performed by: FAMILY MEDICINE

## 2025-06-04 PROCEDURE — 85025 COMPLETE CBC W/AUTO DIFF WBC: CPT

## 2025-06-04 PROCEDURE — 84443 ASSAY THYROID STIM HORMONE: CPT

## 2025-06-04 PROCEDURE — 3074F SYST BP LT 130 MM HG: CPT | Mod: CPTII,S$GLB,, | Performed by: FAMILY MEDICINE

## 2025-06-04 PROCEDURE — 83036 HEMOGLOBIN GLYCOSYLATED A1C: CPT

## 2025-06-04 PROCEDURE — 84439 ASSAY OF FREE THYROXINE: CPT

## 2025-06-04 PROCEDURE — 99214 OFFICE O/P EST MOD 30 MIN: CPT | Mod: S$GLB,,, | Performed by: FAMILY MEDICINE

## 2025-06-04 PROCEDURE — 80053 COMPREHEN METABOLIC PANEL: CPT

## 2025-06-05 ENCOUNTER — PATIENT OUTREACH (OUTPATIENT)
Dept: ADMINISTRATIVE | Facility: HOSPITAL | Age: 65
End: 2025-06-05
Payer: COMMERCIAL

## 2025-06-07 PROBLEM — N18.6 ESRD (END STAGE RENAL DISEASE): Status: ACTIVE | Noted: 2025-06-07

## 2025-06-07 PROBLEM — I50.20 HFREF (HEART FAILURE WITH REDUCED EJECTION FRACTION): Status: ACTIVE | Noted: 2025-06-07

## 2025-06-07 NOTE — ASSESSMENT & PLAN NOTE
Repeat echo showed   Left Ventricle: The left ventricle is moderately dilated. Mildly increased wall thickness. There is eccentric hypertrophy. Regional wall motion abnormalities present. See diagram for wall motion findings. There is moderately reduced systolic function with a visually estimated ejection fraction of 35 - 40%. Quantitated ejection fraction is 38%. Unable to assess diastolic function due to poor image quality.    Right Ventricle: The right ventricle is normal in size Systolic function is normal. TAPSE is 2.3 cm.    Overall the study quality was technically difficult.

## 2025-06-07 NOTE — ED NOTES
Pt notified this NRP that he's dialysis fistula is bleeding. Pressure dressing applied to manage bleeding. Provider Arcadio THOMPSON at bedside, CHRIS Zuluaga notified. Heparin immediately stopped. Pt has no complaints at this time. MD states repeat APTT and CBC

## 2025-06-07 NOTE — SUBJECTIVE & OBJECTIVE
Past Medical History:   Diagnosis Date    Acute congestive heart failure 09/13/2024    Allergy     Anemia     Anticoagulant long-term use     Arthritis     CKD (chronic kidney disease) stage 4, GFR 15-29 ml/min     Closed fracture of transverse process of lumbar vertebra 02/16/2024    COPD (chronic obstructive pulmonary disease) 08/20/2021    Coronary artery disease     Heart attack 10/04/2019    Hematuria     Hemothorax     Hyperlipidemia     Hypertension     Hyperuricemia     Hypocalcemia     Hypokalemia     Hypophosphatemia     Hypothyroidism 03/02/2023    PAD (peripheral artery disease)     Proteinuria     Vitamin D deficiency        Past Surgical History:   Procedure Laterality Date    ABDOMINAL AORTOGRAPHY N/A 5/10/2019    Procedure: AORTOGRAM-ABDOMINAL;  Surgeon: Keo Jenkins MD;  Location: [facility] CATH LAB/EP;  Service: Cardiology;  Laterality: N/A;  RSFA intervention     ANGIOGRAM, CORONARY, WITH LEFT HEART CATHETERIZATION N/A 1/24/2025    Procedure: Angiogram, Coronary, with Left Heart Cath;  Surgeon: Valente Moran MD;  Location: [facility] CATH LAB/EP;  Service: Cardiology;  Laterality: N/A;    AORTOGRAPHY WITH SERIALOGRAPHY N/A 12/3/2021    Procedure: AORTOGRAM, WITH SERIALOGRAPHY;  Surgeon: Keo Jenkins MD;  Location: [facility] CATH LAB/EP;  Service: Cardiology;  Laterality: N/A;    AORTOGRAPHY WITH SERIALOGRAPHY N/A 4/1/2022    Procedure: AORTOGRAM, WITH SERIALOGRAPHY;  Surgeon: Keo Jenkins MD;  Location: [facility] CATH LAB/EP;  Service: Cardiology;  Laterality: N/A;    ATHERECTOMY, CORONARY N/A 4/25/2023    Procedure: Atherectomy-coronary;  Surgeon: Keo Jenkins MD;  Location: [facility] CATH LAB/EP;  Service: Cardiology;  Laterality: N/A;    ATHERECTOMY, CORONARY N/A 1/24/2025    Procedure: Atherectomy-coronary;  Surgeon: Valente Moran MD;  Location: [facility] CATH LAB/EP;  Service: Cardiology;  Laterality: N/A;    BONE MARROW BIOPSY Right 10/12/2021    Procedure: BIOPSY-BONE MARROW;  Surgeon: Naseem  MD Chuck;  Location: [facility] OR;  Service: Oncology;  Laterality: Right;    CARDIAC CATHETERIZATION      CATARACT EXTRACTION      CATARACT EXTRACTION W/  INTRAOCULAR LENS IMPLANT Right 6/8/2020    Procedure: EXTRACTION, CATARACT, WITH IOL INSERTION;  Surgeon: Tin Light MD;  Location: [facility] OR;  Service: Ophthalmology;  Laterality: Right;    CATARACT EXTRACTION W/  INTRAOCULAR LENS IMPLANT Left 7/2/2020    Procedure: EXTRACTION, CATARACT, WITH IOL INSERTION;  Surgeon: Tin Light MD;  Location: [facility] OR;  Service: Ophthalmology;  Laterality: Left;    COLONOSCOPY N/A 1/6/2022    Procedure: COLONOSCOPY;  Surgeon: Kathe Penaloza MD;  Location: [facility] ENDO (2ND FLR);  Service: Endoscopy;  Laterality: N/A;  COVID test at [facility] on 1/3-GT  okay to hold Plavix for 5 days and aspirin per Dr. Becerra  2nd floor due toextensive cardiac history   instructions mailed and my [facility] portal -     CORONARY ANGIOGRAPHY N/A 3/29/2019    Procedure: ANGIOGRAM, CORONARY ARTERY;  Surgeon: Keo Jenkins MD;  Location: [facility] CATH LAB/EP;  Service: Cardiology;  Laterality: N/A;    CORONARY ANGIOGRAPHY Left 10/11/2019    Procedure: ANGIOGRAM, CORONARY ARTERY;  Surgeon: Keo Jenkins MD;  Location: [facility] CATH LAB/EP;  Service: Cardiology;  Laterality: Left;    CORONARY ANGIOGRAPHY N/A 4/10/2023    Procedure: ANGIOGRAM, CORONARY ARTERY;  Surgeon: Keo Jenkins MD;  Location: [facility] CATH LAB/EP;  Service: Cardiology;  Laterality: N/A;    CORONARY ANGIOGRAPHY N/A 6/26/2024    Procedure: ANGIOGRAM, CORONARY ARTERY;  Surgeon: Enoch Tirado MD;  Location: [facility] CATH LAB/EP;  Service: Cardiology;  Laterality: N/A;    CORONARY ANGIOGRAPHY N/A 9/16/2024    Procedure: ANGIOGRAM, CORONARY ARTERY;  Surgeon: Enoch Tirado MD;  Location: [facility] CATH LAB/EP;  Service: Cardiology;  Laterality: N/A;    CORONARY ANGIOPLASTY WITH STENT PLACEMENT  03/29/2019    mid and distal RCA    CORONARY ANGIOPLASTY WITH STENT PLACEMENT N/A 1/24/2025     Procedure: ANGIOPLASTY, CORONARY ARTERY, WITH STENT INSERTION;  Surgeon: Valente Moran MD;  Location: [facility] CATH LAB/EP;  Service: Cardiology;  Laterality: N/A;    ESOPHAGOGASTRODUODENOSCOPY N/A 1/6/2022    Procedure: EGD (ESOPHAGOGASTRODUODENOSCOPY);  Surgeon: Kathe Penaloza MD;  Location: [address]);  Service: Endoscopy;  Laterality: N/A;    EYE SURGERY      INSERTION OF TUNNELED CENTRAL VENOUS HEMODIALYSIS CATHETER N/A 2/9/2024    Procedure: INSERTION, CATHETER, HEMODIALYSIS, DUAL LUMEN;  Surgeon: Wang Mendieta MD;  Location: [facility] OR;  Service: General;  Laterality: N/A;    INSTANTANEOUS WAVE-FREE RATIO (IFR) N/A 4/25/2023    Procedure: Instantaneous Wave-Free Ratio (IFR);  Surgeon: Keo Jenkins MD;  Location: [facility] CATH LAB/EP;  Service: Cardiology;  Laterality: N/A;    INTRAVASCULAR ULTRASOUND, NON-CORONARY  8/12/2022    Procedure: Intravascular Ultrasound, Non-Coronary;  Surgeon: Keo Jenkins MD;  Location: [facility] CATH LAB/EP;  Service: Cardiology;;    IVUS, CORONARY  4/25/2023    Procedure: IVUS, Coronary;  Surgeon: Keo Jenkins MD;  Location: [facility] CATH LAB/EP;  Service: Cardiology;;    IVUS, CORONARY  5/16/2023    Procedure: IVUS, Coronary;  Surgeon: Keo Jenkins MD;  Location: [facility] CATH LAB/EP;  Service: Cardiology;;  CX    IVUS, CORONARY  1/24/2025    Procedure: IVUS, Coronary;  Surgeon: Valente Moran MD;  Location: [facility] CATH LAB/EP;  Service: Cardiology;;    LEFT HEART CATHETERIZATION N/A 3/29/2019    Procedure: Left heart cath;  Surgeon: Keo Jenkins MD;  Location: [facility] CATH LAB/EP;  Service: Cardiology;  Laterality: N/A;    LEFT HEART CATHETERIZATION Left 10/8/2019    Procedure: Left heart cath;  Surgeon: Keo Jenkins MD;  Location: [facility] CATH LAB/EP;  Service: Cardiology;  Laterality: Left;    LEFT HEART CATHETERIZATION Left 4/10/2023    Procedure: Left heart cath;  Surgeon: Keo Jenkins MD;  Location: [facility] CATH LAB/EP;  Service: Cardiology;  Laterality: Left;     LEFT HEART CATHETERIZATION Left 4/25/2023    Procedure: Left heart cath;  Surgeon: Keo Jenkins MD;  Location: [facility] CATH LAB/EP;  Service: Cardiology;  Laterality: Left;    LEFT HEART CATHETERIZATION Left 5/16/2023    Procedure: Left heart cath;  Surgeon: Keo Jenkins MD;  Location: [facility] CATH LAB/EP;  Service: Cardiology;  Laterality: Left;    LEFT HEART CATHETERIZATION Left 6/26/2024    Procedure: Left heart cath;  Surgeon: Enoch Tirado MD;  Location: [facility] CATH LAB/EP;  Service: Cardiology;  Laterality: Left;    LEFT HEART CATHETERIZATION Left 9/16/2024    Procedure: Left heart cath;  Surgeon: Enoch Tirado MD;  Location: [facility] CATH LAB/EP;  Service: Cardiology;  Laterality: Left;    LITHOTRIPSY, CORONARY TRANSLUMINAL, PERCUTANEOUS  9/16/2024    Procedure: LITHOTRIPSY, CORONARY TRANSLUMINAL, PERCUTANEOUS;  Surgeon: Enoch Tirado MD;  Location: [facility] CATH LAB/EP;  Service: Cardiology;;    PERCUTANEOUS CORONARY INTERVENTION, ARTERY N/A 6/26/2024    Procedure: Percutaneous coronary intervention;  Surgeon: Enoch Tirado MD;  Location: [facility] CATH LAB/EP;  Service: Cardiology;  Laterality: N/A;    PERCUTANEOUS TRANSLUMINAL ANGIOPLASTY (PTA) OF PERIPHERAL VESSEL N/A 7/12/2019    Procedure: PTA, PERIPHERAL VESSEL;  Surgeon: Keo Jenkins MD;  Location: [facility] CATH LAB/EP;  Service: Cardiology;  Laterality: N/A;    PERCUTANEOUS TRANSLUMINAL ANGIOPLASTY (PTA) OF PERIPHERAL VESSEL Left 5/20/2022    Procedure: PTA, PERIPHERAL VESSEL;  Surgeon: Keo Jenkins MD;  Location: [facility] CATH LAB/EP;  Service: Cardiology;  Laterality: Left;    PERCUTANEOUS TRANSLUMINAL ANGIOPLASTY (PTA) OF PERIPHERAL VESSEL Right 8/12/2022    Procedure: PTA, PERIPHERAL VESSEL;  Surgeon: Keo Jenkins MD;  Location: [facility] CATH LAB/EP;  Service: Cardiology;  Laterality: Right;    PERCUTANEOUS TRANSLUMINAL BALLOON ANGIOPLASTY OF CORONARY ARTERY  4/25/2023    Procedure: Angioplasty-coronary;  Surgeon: Keo Jenkins  MD;  Location: [facility] CATH LAB/EP;  Service: Cardiology;;    PLACEMENT OF ARTERIOVENOUS GRAFT Left 4/15/2024    Procedure: INSERTION, GRAFT, ARTERIOVENOUS;  Surgeon: Scott Pascual MD;  Location: [facility] OR;  Service: General;  Laterality: Left;    PTCA, SINGLE VESSEL  5/16/2023    Procedure: PTCA, Single Vessel;  Surgeon: Keo Jenkins MD;  Location: [facility] CATH LAB/EP;  Service: Cardiology;;  CX    PTCA, SINGLE VESSEL  6/26/2024    Procedure: PTCA, Single Vessel;  Surgeon: Enoch Tirado MD;  Location: [facility] CATH LAB/EP;  Service: Cardiology;;    PTCA, SINGLE VESSEL Left 9/16/2024    Procedure: PTCA, Single Vessel;  Surgeon: Enoch Tirado MD;  Location: [facility] CATH LAB/EP;  Service: Cardiology;  Laterality: Left;    REMOVAL OF HEMODIALYSIS CATHETER Right 2/9/2024    Procedure: REMOVAL, CATHETER, HEMODIALYSIS;  Surgeon: Wang Mendieta MD;  Location: [facility] OR;  Service: General;  Laterality: Right;    REMOVAL OF TUNNELED CENTRAL VENOUS CATHETER (CVC) Right 5/20/2024    Procedure: REMOVAL, CATHETER, CENTRAL VENOUS, TUNNELED;  Surgeon: Scott Pascual MD;  Location: [facility];  Service: General;  Laterality: Right;    REPAIR, CHRONIC TOTAL OCCLUSION, CORONARY  6/26/2024    Procedure: Repair, Chronic Total Occlusion, Coronary;  Surgeon: Enoch Tirado MD;  Location: [facility] CATH LAB/EP;  Service: Cardiology;;    STENT, DRUG ELUTING, SINGLE VESSEL, CORONARY  4/25/2023    Procedure: Stent, Drug Eluting, Single Vessel, Coronary;  Surgeon: Keo Jenkins MD;  Location: [facility] CATH LAB/EP;  Service: Cardiology;;    STENT, DRUG ELUTING, SINGLE VESSEL, CORONARY  5/16/2023    Procedure: Stent, Drug Eluting, Single Vessel, Coronary;  Surgeon: Keo Jenkins MD;  Location: [facility] CATH LAB/EP;  Service: Cardiology;;  CX    TRANSESOPHAGEAL ECHOCARDIOGRAM WITH POSSIBLE CARDIOVERSION (JOSE W/ POSS CARDIOVERSION) N/A 6/19/2023    Procedure: Transesophageal echo (JOSE) intra-procedure log documentation;  Surgeon:  Keo Jenkins MD;  Location: [facility] CATH LAB/EP;  Service: Cardiology;  Laterality: N/A;       Review of patient's allergies indicates:   Allergen Reactions    Ace inhibitors Rash       Current Facility-Administered Medications on File Prior to Encounter   Medication    sodium chloride 0.9% flush 10 mL     Current Outpatient Medications on File Prior to Encounter   Medication Sig    albuterol-ipratropium (DUO-NEB) 2.5 mg-0.5 mg/3 mL nebulizer solution Use 1 vial by nebulization every 6 (six) hours as needed for Wheezing or Shortness of Breath (or cough). Rescue    apixaban (ELIQUIS) 5 mg Tab Take 1 tablet (5 mg total) by mouth 2 (two) times daily.    carvediloL (COREG) 12.5 MG tablet Take 1 tablet (12.5 mg total) by mouth 2 (two) times daily with meals.    clopidogreL (PLAVIX) 75 mg tablet Take 1 tablet (75 mg total) by mouth once daily.    coenzyme Q10 100 mg capsule Take 1 capsule (100 mg total) by mouth once daily.    eplerenone (INSPRA) 50 MG Tab Take 50 mg by mouth once daily.    hydrALAZINE (APRESOLINE) 50 MG tablet Take 1 tablet (50 mg total) by mouth every 12 (twelve) hours.    hydrOXYzine pamoate (VISTARIL) 25 MG Cap Take 1 capsule (25 mg total) by mouth nightly as needed (Sleep).    rosuvastatin (CRESTOR) 40 MG Tab Take 1 tablet (40 mg total) by mouth every evening.    levothyroxine (SYNTHROID) 75 MCG tablet Take 1 tablet (75 mcg total) by mouth before breakfast.    nitroGLYCERIN (NITROSTAT) 0.4 MG SL tablet Place 1 tablet (0.4 mg total) under the tongue every 5 (five) minutes as needed for Chest pain (for a max of 3 tabs in 15 minutes).    ranolazine (RANEXA) 1,000 mg Tb12 Take 1 tablet (1,000 mg total) by mouth 2 (two) times daily.    [DISCONTINUED] molnupiravir (LAGEVRIO, EUA, ORAL) Take by mouth.     Family History       Problem Relation (Age of Onset)    Aneurysm Mother    Cancer Father    Diabetes Sister    Heart disease Mother    Hypertension Mother, Sister    No Known Problems Brother, Son           Tobacco Use    Smoking status: Former     Current packs/day: 0.00     Types: Cigarettes     Quit date: 1999     Years since quittin.1     Passive exposure: Never    Smokeless tobacco: Never   Substance and Sexual Activity    Alcohol use: No     Alcohol/week: 0.0 standard drinks of alcohol    Drug use: No    Sexual activity: Yes     Partners: Female     Review of Systems   Constitutional: Negative.    HENT: Negative.     Respiratory:  Positive for chest tightness.    Cardiovascular:  Positive for chest pain.   Gastrointestinal: Negative.    Genitourinary: Negative.    Musculoskeletal: Negative.    Skin: Negative.    Neurological: Negative.    Psychiatric/Behavioral: Negative.       Objective:     Vital Signs (Most Recent):  Temp: 98 °F (36.7 °C) (25 0930)  Pulse: 80 (25 1400)  Resp: (!) 25 (25 1400)  BP: (!) 117/58 (25 1400)  SpO2: (!) 93 % (25 1400) Vital Signs (24h Range):  Temp:  [98 °F (36.7 °C)] 98 °F (36.7 °C)  Pulse:  [75-80] 80  Resp:  [18-25] 25  SpO2:  [92 %-96 %] 93 %  BP: (113-118)/(55-68) 117/58     Weight: 81.6 kg (180 lb)  Body mass index is 25.83 kg/m².     Physical Exam  Constitutional:       Appearance: He is ill-appearing.   HENT:      Head: Normocephalic and atraumatic.   Cardiovascular:      Rate and Rhythm: Normal rate.   Pulmonary:      Effort: Pulmonary effort is normal.      Breath sounds: Normal breath sounds.   Abdominal:      Palpations: Abdomen is soft.   Skin:     General: Skin is warm.   Neurological:      General: No focal deficit present.      Mental Status: He is alert. Mental status is at baseline.   Psychiatric:         Mood and Affect: Mood normal.         Behavior: Behavior normal.                Significant Labs: All pertinent labs within the past 24 hours have been reviewed.    Significant Imaging: I have reviewed all pertinent imaging results/findings within the past 24 hours.

## 2025-06-07 NOTE — ASSESSMENT & PLAN NOTE
Creatine stable for now. BMP reviewed- noted Estimated Creatinine Clearance: 19 mL/min (A) (based on SCr of 4.1 mg/dL (H)). according to latest data. Based on current GFR, CKD stage is end stage.  Monitor UOP and serial BMP and adjust therapy as needed. Renally dose meds. Avoid nephrotoxic medications and procedures.    ESRD On HD,

## 2025-06-07 NOTE — ASSESSMENT & PLAN NOTE
Patient's blood pressure range in the last 24 hours was: BP  Min: 108/67  Max: 118/55.The patient's inpatient anti-hypertensive regimen is listed below:  Current Antihypertensives  carvediloL tablet 12.5 mg, 2 times daily with meals, Oral    Plan  - BP is controlled, no changes needed to their regimen

## 2025-06-07 NOTE — ED NOTES
MD Valderrama at bedside, verbal order for repeat cbc and ptt at this time, primary provider made aware. V/u,.

## 2025-06-07 NOTE — ASSESSMENT & PLAN NOTE
>>ASSESSMENT AND PLAN FOR ESRD (END STAGE RENAL DISEASE) WRITTEN ON 6/7/2025  5:21 PM BY ERICH FRAZIER MD    Creatine stable for now. BMP reviewed- noted Estimated Creatinine Clearance: 19 mL/min (A) (based on SCr of 4.1 mg/dL (H)). according to latest data. Based on current GFR, CKD stage is end stage.  Monitor UOP and serial BMP and adjust therapy as needed. Renally dose meds. Avoid nephrotoxic medications and procedures.    ESRD On HD,

## 2025-06-07 NOTE — ED NOTES
Pt states that he started having this chest pain last night worsening today, states that he took nitro with no relieve. Pt points out pain in the center of upper chest that travels into his throat.

## 2025-06-07 NOTE — PHARMACY MED REC
"    [facility]           Pharmacy  Admission Medication History     The home medication history was taken by Kary Hawk.      Medication history obtained from Medications listed below were obtained from: Patient/family.    Based on information gathered for medication list, you may go to "Admission" then "Reconcile Home Medications" tabs to review and/or act upon those items.     The home medication list has been updated by the Pharmacy department.   Please read ALL comments highlighted in yellow.   Please address this information as you see fit.    Feel free to contact us if you have any questions or require assistance.        Current Facility-Administered Medications on File Prior to Encounter   Medication Dose Route Frequency Provider Last Rate Last Admin    sodium chloride 0.9% flush 10 mL  10 mL Intravenous PRN Enoch Tirado MD         Current Outpatient Medications on File Prior to Encounter   Medication Sig Dispense Refill    albuterol-ipratropium (DUO-NEB) 2.5 mg-0.5 mg/3 mL nebulizer solution Use 1 vial by nebulization every 6 (six) hours as needed for Wheezing or Shortness of Breath (or cough). Rescue 90 mL 3    apixaban (ELIQUIS) 5 mg Tab Take 1 tablet (5 mg total) by mouth 2 (two) times daily. 180 tablet 3    carvediloL (COREG) 12.5 MG tablet Take 1 tablet (12.5 mg total) by mouth 2 (two) times daily with meals. 180 tablet 3    clopidogreL (PLAVIX) 75 mg tablet Take 1 tablet (75 mg total) by mouth once daily. 90 tablet 3    coenzyme Q10 100 mg capsule Take 1 capsule (100 mg total) by mouth once daily. 90 capsule 3    eplerenone (INSPRA) 50 MG Tab Take 50 mg by mouth once daily.      hydrALAZINE (APRESOLINE) 50 MG tablet Take 1 tablet (50 mg total) by mouth every 12 (twelve) hours. 180 tablet 3    hydrOXYzine pamoate (VISTARIL) 25 MG Cap Take 1 capsule (25 mg total) by mouth nightly as needed (Sleep). 90 capsule 2    rosuvastatin (CRESTOR) 40 MG Tab Take 1 tablet (40 mg " total) by mouth every evening. 90 tablet 3    levothyroxine (SYNTHROID) 75 MCG tablet Take 1 tablet (75 mcg total) by mouth before breakfast. 30 tablet 11    nitroGLYCERIN (NITROSTAT) 0.4 MG SL tablet Place 1 tablet (0.4 mg total) under the tongue every 5 (five) minutes as needed for Chest pain (for a max of 3 tabs in 15 minutes). 25 tablet 4    ranolazine (RANEXA) 1,000 mg Tb12 Take 1 tablet (1,000 mg total) by mouth 2 (two) times daily. 180 tablet 3       Please address this information as you see fit.  Feel free to contact us if you have any questions or require assistance.    Kary Hawk  [phone]              .

## 2025-06-07 NOTE — ED NOTES
Secured homer Ervin who stated no, will wait for PTT and follow the protocol accordingly, as long as his BP is stable and his bleeding stopped

## 2025-06-07 NOTE — PROGRESS NOTES
VIRTUAL NURSE: Pt arrived to unit. Permission received per patient to turn camera to view patient. VIP model explained; patient informed this VN will be working with bedside nurse and the rest of the care team. Plan of care reviewed with patient.  Educated patient on fall risk and fall risk precautions in place. Call light within reach, side rails up x2. Admission questions completed. Patient instucted to ask staff for assistance. Patient verbalized complete understanding. Patient denies complaints or any needs at this time. Will continue to be available and intervene as needed.           06/07/25 1741   Admission   Initial VN Admission Questions Complete   Communication Issues? None   Shift   Virtual Nurse - Rounding Complete   Virtual Nurse - Patient Verbalized Approval Of Camera Use   Safety/Activity   Patient Rounds bed in low position;call light in patient/parent reach;clutter free environment maintained;visualized patient   Safety Promotion/Fall Prevention side rails raised x 2   Activity Management Sitting at edge of bed - L2   Pain/Comfort/Sleep   Comfort/Acceptable Pain Level 7         Labs, notes, orders, and careplan reviewed.

## 2025-06-07 NOTE — ED NOTES
Rounding on pt, pt in bed awake, alert and orient with GCS of 15. Pt has no complaints at this time

## 2025-06-07 NOTE — ED PROVIDER NOTES
Encounter Date: 6/7/2025       History     Chief Complaint   Patient presents with    Chest Pain     Patient reports chest pain and shortness of breath that started last night and worsened while at dialysis today. He reports hx of MI and multiple stents. Patient took 2 nitro last night and 1 nitro while at dialysis this morning with little relief.      Domingo Gross is a 64 y.o. male who  has a past medical history of Acute congestive heart failure (09/13/2024), Allergy, Anemia, Anticoagulant long-term use, Arthritis, CKD (chronic kidney disease) stage 4, GFR 15-29 ml/min, Closed fracture of transverse process of lumbar vertebra (02/16/2024), COPD (chronic obstructive pulmonary disease) (08/20/2021), Coronary artery disease, Heart attack (10/04/2019), Hematuria, Hemothorax, Hyperlipidemia, Hypertension, Hyperuricemia, Hypocalcemia, Hypokalemia, Hypophosphatemia, Hypothyroidism (03/02/2023), PAD (peripheral artery disease), Proteinuria, and Vitamin D deficiency.    The patient presents to the ED due to chest paoin onset yesterday. He presents today due to chest pain.  Patient began having chest pain yesterday, his symptoms worsened after receiving dialysis today.  He describes pain similar to previous heart attacks and reports he is pending a cardiac stent.  He reports mild shortness of breath.  Denies any fever exertional pain radiation of his symptoms to his back legs or any other concerns         Review of patient's allergies indicates:   Allergen Reactions    Ace inhibitors Rash     Past Medical History:   Diagnosis Date    Acute congestive heart failure 09/13/2024    Allergy     Anemia     Anticoagulant long-term use     Arthritis     CKD (chronic kidney disease) stage 4, GFR 15-29 ml/min     Closed fracture of transverse process of lumbar vertebra 02/16/2024    COPD (chronic obstructive pulmonary disease) 08/20/2021    Coronary artery disease     Heart attack 10/04/2019    Hematuria     Hemothorax      Hyperlipidemia     Hypertension     Hyperuricemia     Hypocalcemia     Hypokalemia     Hypophosphatemia     Hypothyroidism 03/02/2023    PAD (peripheral artery disease)     Proteinuria     Vitamin D deficiency      Past Surgical History:   Procedure Laterality Date    ABDOMINAL AORTOGRAPHY N/A 5/10/2019    Procedure: AORTOGRAM-ABDOMINAL;  Surgeon: Keo Jenkins MD;  Location: [facility] CATH LAB/EP;  Service: Cardiology;  Laterality: N/A;  RSFA intervention     ANGIOGRAM, CORONARY, WITH LEFT HEART CATHETERIZATION N/A 1/24/2025    Procedure: Angiogram, Coronary, with Left Heart Cath;  Surgeon: Valente Moran MD;  Location: [facility] CATH LAB/EP;  Service: Cardiology;  Laterality: N/A;    AORTOGRAPHY WITH SERIALOGRAPHY N/A 12/3/2021    Procedure: AORTOGRAM, WITH SERIALOGRAPHY;  Surgeon: Keo Jenkins MD;  Location: [facility] CATH LAB/EP;  Service: Cardiology;  Laterality: N/A;    AORTOGRAPHY WITH SERIALOGRAPHY N/A 4/1/2022    Procedure: AORTOGRAM, WITH SERIALOGRAPHY;  Surgeon: Keo Jenkins MD;  Location: [facility] CATH LAB/EP;  Service: Cardiology;  Laterality: N/A;    ATHERECTOMY, CORONARY N/A 4/25/2023    Procedure: Atherectomy-coronary;  Surgeon: Keo Jenkins MD;  Location: [facility] CATH LAB/EP;  Service: Cardiology;  Laterality: N/A;    ATHERECTOMY, CORONARY N/A 1/24/2025    Procedure: Atherectomy-coronary;  Surgeon: Valente Moran MD;  Location: [facility] CATH LAB/EP;  Service: Cardiology;  Laterality: N/A;    BONE MARROW BIOPSY Right 10/12/2021    Procedure: BIOPSY-BONE MARROW;  Surgeon: Naseem Woods MD;  Location: [facility] OR;  Service: Oncology;  Laterality: Right;    CARDIAC CATHETERIZATION      CATARACT EXTRACTION      CATARACT EXTRACTION W/  INTRAOCULAR LENS IMPLANT Right 6/8/2020    Procedure: EXTRACTION, CATARACT, WITH IOL INSERTION;  Surgeon: Tin Light MD;  Location: [facility] OR;  Service: Ophthalmology;  Laterality: Right;    CATARACT EXTRACTION W/  INTRAOCULAR LENS IMPLANT Left 7/2/2020    Procedure: EXTRACTION,  CATARACT, WITH IOL INSERTION;  Surgeon: Tin Light MD;  Location: [facility] OR;  Service: Ophthalmology;  Laterality: Left;    COLONOSCOPY N/A 1/6/2022    Procedure: COLONOSCOPY;  Surgeon: Kathe Penaloza MD;  Location: [facility] ENDO (2ND FLR);  Service: Endoscopy;  Laterality: N/A;  COVID test at [facility] on 1/3-GT  okay to hold Plavix for 5 days and aspirin per Dr. Becerra  2nd floor due toextensive cardiac history   instructions mailed and my [facility] portal -     CORONARY ANGIOGRAPHY N/A 3/29/2019    Procedure: ANGIOGRAM, CORONARY ARTERY;  Surgeon: Keo Jenkins MD;  Location: [facility] CATH LAB/EP;  Service: Cardiology;  Laterality: N/A;    CORONARY ANGIOGRAPHY Left 10/11/2019    Procedure: ANGIOGRAM, CORONARY ARTERY;  Surgeon: Keo Jenkins MD;  Location: [facility] CATH LAB/EP;  Service: Cardiology;  Laterality: Left;    CORONARY ANGIOGRAPHY N/A 4/10/2023    Procedure: ANGIOGRAM, CORONARY ARTERY;  Surgeon: Keo Jenkins MD;  Location: [facility] CATH LAB/EP;  Service: Cardiology;  Laterality: N/A;    CORONARY ANGIOGRAPHY N/A 6/26/2024    Procedure: ANGIOGRAM, CORONARY ARTERY;  Surgeon: Enoch Tirado MD;  Location: [facility] CATH LAB/EP;  Service: Cardiology;  Laterality: N/A;    CORONARY ANGIOGRAPHY N/A 9/16/2024    Procedure: ANGIOGRAM, CORONARY ARTERY;  Surgeon: Enoch Tirado MD;  Location: [facility] CATH LAB/EP;  Service: Cardiology;  Laterality: N/A;    CORONARY ANGIOPLASTY WITH STENT PLACEMENT  03/29/2019    mid and distal RCA    CORONARY ANGIOPLASTY WITH STENT PLACEMENT N/A 1/24/2025    Procedure: ANGIOPLASTY, CORONARY ARTERY, WITH STENT INSERTION;  Surgeon: Valente Moran MD;  Location: [facility] CATH LAB/EP;  Service: Cardiology;  Laterality: N/A;    ESOPHAGOGASTRODUODENOSCOPY N/A 1/6/2022    Procedure: EGD (ESOPHAGOGASTRODUODENOSCOPY);  Surgeon: Kathe Penaloza MD;  Location: [facility] NARGIS (2ND FLR);  Service: Endoscopy;  Laterality: N/A;    EYE SURGERY      INSERTION OF TUNNELED CENTRAL VENOUS HEMODIALYSIS  CATHETER N/A 2/9/2024    Procedure: INSERTION, CATHETER, HEMODIALYSIS, DUAL LUMEN;  Surgeon: Wang Mendieta MD;  Location: [facility] OR;  Service: General;  Laterality: N/A;    INSTANTANEOUS WAVE-FREE RATIO (IFR) N/A 4/25/2023    Procedure: Instantaneous Wave-Free Ratio (IFR);  Surgeon: Keo Jenkins MD;  Location: [facility] CATH LAB/EP;  Service: Cardiology;  Laterality: N/A;    INTRAVASCULAR ULTRASOUND, NON-CORONARY  8/12/2022    Procedure: Intravascular Ultrasound, Non-Coronary;  Surgeon: Keo Jenkins MD;  Location: [facility] CATH LAB/EP;  Service: Cardiology;;    IVUS, CORONARY  4/25/2023    Procedure: IVUS, Coronary;  Surgeon: Keo Jenkins MD;  Location: [facility] CATH LAB/EP;  Service: Cardiology;;    IVUS, CORONARY  5/16/2023    Procedure: IVUS, Coronary;  Surgeon: Keo Jenkins MD;  Location: [facility] CATH LAB/EP;  Service: Cardiology;;  CX    IVUS, CORONARY  1/24/2025    Procedure: IVUS, Coronary;  Surgeon: Valente Moran MD;  Location: [facility] CATH LAB/EP;  Service: Cardiology;;    LEFT HEART CATHETERIZATION N/A 3/29/2019    Procedure: Left heart cath;  Surgeon: Keo Jenkins MD;  Location: [facility] CATH LAB/EP;  Service: Cardiology;  Laterality: N/A;    LEFT HEART CATHETERIZATION Left 10/8/2019    Procedure: Left heart cath;  Surgeon: Keo Jenkins MD;  Location: [facility] CATH LAB/EP;  Service: Cardiology;  Laterality: Left;    LEFT HEART CATHETERIZATION Left 4/10/2023    Procedure: Left heart cath;  Surgeon: Keo Jenkins MD;  Location: [facility] CATH LAB/EP;  Service: Cardiology;  Laterality: Left;    LEFT HEART CATHETERIZATION Left 4/25/2023    Procedure: Left heart cath;  Surgeon: Keo Jenkins MD;  Location: [facility] CATH LAB/EP;  Service: Cardiology;  Laterality: Left;    LEFT HEART CATHETERIZATION Left 5/16/2023    Procedure: Left heart cath;  Surgeon: Keo Jenkins MD;  Location: [facility] CATH LAB/EP;  Service: Cardiology;  Laterality: Left;    LEFT HEART CATHETERIZATION Left 6/26/2024    Procedure: Left heart cath;   Surgeon: Enoch Tirado MD;  Location: [facility] CATH LAB/EP;  Service: Cardiology;  Laterality: Left;    LEFT HEART CATHETERIZATION Left 9/16/2024    Procedure: Left heart cath;  Surgeon: Enoch Tirado MD;  Location: [facility] CATH LAB/EP;  Service: Cardiology;  Laterality: Left;    LITHOTRIPSY, CORONARY TRANSLUMINAL, PERCUTANEOUS  9/16/2024    Procedure: LITHOTRIPSY, CORONARY TRANSLUMINAL, PERCUTANEOUS;  Surgeon: Enoch Tirado MD;  Location: [facility] CATH LAB/EP;  Service: Cardiology;;    PERCUTANEOUS CORONARY INTERVENTION, ARTERY N/A 6/26/2024    Procedure: Percutaneous coronary intervention;  Surgeon: Enoch Tirado MD;  Location: [facility] CATH LAB/EP;  Service: Cardiology;  Laterality: N/A;    PERCUTANEOUS TRANSLUMINAL ANGIOPLASTY (PTA) OF PERIPHERAL VESSEL N/A 7/12/2019    Procedure: PTA, PERIPHERAL VESSEL;  Surgeon: Keo Jenkins MD;  Location: [facility] CATH LAB/EP;  Service: Cardiology;  Laterality: N/A;    PERCUTANEOUS TRANSLUMINAL ANGIOPLASTY (PTA) OF PERIPHERAL VESSEL Left 5/20/2022    Procedure: PTA, PERIPHERAL VESSEL;  Surgeon: Keo Jenkins MD;  Location: [facility] CATH LAB/EP;  Service: Cardiology;  Laterality: Left;    PERCUTANEOUS TRANSLUMINAL ANGIOPLASTY (PTA) OF PERIPHERAL VESSEL Right 8/12/2022    Procedure: PTA, PERIPHERAL VESSEL;  Surgeon: Keo Jenkins MD;  Location: [facility] CATH LAB/EP;  Service: Cardiology;  Laterality: Right;    PERCUTANEOUS TRANSLUMINAL BALLOON ANGIOPLASTY OF CORONARY ARTERY  4/25/2023    Procedure: Angioplasty-coronary;  Surgeon: Keo Jenkins MD;  Location: [facility] CATH LAB/EP;  Service: Cardiology;;    PLACEMENT OF ARTERIOVENOUS GRAFT Left 4/15/2024    Procedure: INSERTION, GRAFT, ARTERIOVENOUS;  Surgeon: Scott Pascual MD;  Location: [facility] OR;  Service: General;  Laterality: Left;    PTCA, SINGLE VESSEL  5/16/2023    Procedure: PTCA, Single Vessel;  Surgeon: Keo Jenkins MD;  Location: [facility] CATH LAB/EP;  Service: Cardiology;;  CX    PTCA, SINGLE VESSEL   6/26/2024    Procedure: PTCA, Single Vessel;  Surgeon: Enoch Tirado MD;  Location: [facility] CATH LAB/EP;  Service: Cardiology;;    PTCA, SINGLE VESSEL Left 9/16/2024    Procedure: PTCA, Single Vessel;  Surgeon: Enoch Tirado MD;  Location: [facility] CATH LAB/EP;  Service: Cardiology;  Laterality: Left;    REMOVAL OF HEMODIALYSIS CATHETER Right 2/9/2024    Procedure: REMOVAL, CATHETER, HEMODIALYSIS;  Surgeon: Wang Mendieta MD;  Location: [facility] OR;  Service: General;  Laterality: Right;    REMOVAL OF TUNNELED CENTRAL VENOUS CATHETER (CVC) Right 5/20/2024    Procedure: REMOVAL, CATHETER, CENTRAL VENOUS, TUNNELED;  Surgeon: Scott Pascual MD;  Location: [facility] OR;  Service: General;  Laterality: Right;    REPAIR, CHRONIC TOTAL OCCLUSION, CORONARY  6/26/2024    Procedure: Repair, Chronic Total Occlusion, Coronary;  Surgeon: Enoch Tirado MD;  Location: [facility] CATH LAB/EP;  Service: Cardiology;;    STENT, DRUG ELUTING, SINGLE VESSEL, CORONARY  4/25/2023    Procedure: Stent, Drug Eluting, Single Vessel, Coronary;  Surgeon: Keo Jenkins MD;  Location: [facility] CATH LAB/EP;  Service: Cardiology;;    STENT, DRUG ELUTING, SINGLE VESSEL, CORONARY  5/16/2023    Procedure: Stent, Drug Eluting, Single Vessel, Coronary;  Surgeon: Keo Jenkins MD;  Location: [facility] CATH LAB/EP;  Service: Cardiology;;  CX    TRANSESOPHAGEAL ECHOCARDIOGRAM WITH POSSIBLE CARDIOVERSION (JOSE W/ POSS CARDIOVERSION) N/A 6/19/2023    Procedure: Transesophageal echo (JOSE) intra-procedure log documentation;  Surgeon: Keo Jenkins MD;  Location: [facility] CATH LAB/EP;  Service: Cardiology;  Laterality: N/A;     Family History   Problem Relation Name Age of Onset    Aneurysm Mother      Hypertension Mother      Heart disease Mother      Cancer Father      Diabetes Sister 1     Hypertension Sister 1     No Known Problems Brother 1     No Known Problems Son 1      Social History[1]  Review of Systems   Constitutional:  Negative for fever.    HENT:  Negative for sore throat.    Respiratory:  Positive for shortness of breath.    Cardiovascular:  Positive for chest pain.   Gastrointestinal:  Negative for nausea.   Genitourinary:  Negative for dysuria.   Musculoskeletal:  Negative for back pain.   Skin:  Negative for rash.   Neurological:  Negative for weakness.   Hematological:  Does not bruise/bleed easily.       Physical Exam     Initial Vitals [06/07/25 0930]   BP Pulse Resp Temp SpO2   113/68 77 18 98 °F (36.7 °C) 96 %      MAP       --         Physical Exam    Constitutional: He appears well-nourished. He is not diaphoretic.  Non-toxic appearance. No distress.   HENT:   Head: Normocephalic and atraumatic.   Eyes: Conjunctivae are normal. Pupils are equal, round, and reactive to light. Right eye exhibits no nystagmus. Left eye exhibits no nystagmus.   Neck: Neck supple.   Cardiovascular:  Normal rate, regular rhythm, S1 normal and S2 normal.     Exam reveals no gallop.       No murmur heard.  Pulmonary/Chest: Breath sounds normal. He has no wheezes. He has no rales.   Abdominal: Abdomen is soft. He exhibits no distension. There is no abdominal tenderness.   Musculoskeletal:      Cervical back: Neck supple.     Neurological: He is alert and oriented to person, place, and time. He has normal strength. No sensory deficit. GCS score is 15. GCS eye subscore is 4. GCS verbal subscore is 5. GCS motor subscore is 6.   Skin: Skin is warm and dry. No rash noted.   Psychiatric: He has a normal mood and affect. His behavior is normal.         ED Course   Procedures  Labs Reviewed   COMPREHENSIVE METABOLIC PANEL - Abnormal       Result Value    Sodium 137      Potassium 2.7 (*)     Chloride 89 (*)     CO2 28      Glucose 130 (*)     BUN 21      Creatinine 4.1 (*)     Calcium 8.3 (*)     Protein Total 8.6 (*)     Albumin 3.4 (*)     Bilirubin Total 0.9      ALP 78      AST 15      ALT <5 (*)     Anion Gap 20 (*)     eGFR 16 (*)    TROPONIN I - Abnormal     Troponin-I 0.141 (*)    B-TYPE NATRIURETIC PEPTIDE - Abnormal     (*)    CBC WITH DIFFERENTIAL - Abnormal    WBC 10.82      RBC 4.48 (*)     HGB 12.3 (*)     HCT 36.6 (*)     MCV 82      MCH 27.5      MCHC 33.6      RDW 15.9 (*)     Platelet Count 158      MPV 10.3      Nucleated RBC 0      Neut % 86.3 (*)     Lymph % 4.3 (*)     Mono % 8.3      Eos % 0.3      Basophil % 0.2      Imm Grans % 0.6 (*)     Neut # 9.35 (*)     Lymph # 0.46 (*)     Mono # 0.90      Eos # 0.03      Baso # 0.02      Imm Grans # 0.06 (*)    PROTIME-INR - Abnormal    PT 15.1 (*)     INR 1.3 (*)    APTT - Abnormal    PTT 36.8 (*)    TROPONIN I - Abnormal    Troponin-I 0.144 (*)    CBC WITH DIFFERENTIAL - Abnormal    WBC 12.52      RBC 4.58 (*)     HGB 12.7 (*)     HCT 37.3 (*)     MCV 81 (*)     MCH 27.7      MCHC 34.0      RDW 15.9 (*)     Platelet Count 139 (*)     MPV 9.9      Nucleated RBC 0      Neut % 88.3 (*)     Lymph % 3.5 (*)     Mono % 7.4      Eos % 0.0      Basophil % 0.2      Imm Grans % 0.6 (*)     Neut # 11.05 (*)     Lymph # 0.44 (*)     Mono # 0.93      Eos # 0.00      Baso # 0.02      Imm Grans # 0.08 (*)    APTT - Abnormal    PTT 47.0 (*)    CBC W/ AUTO DIFFERENTIAL    Narrative:     The following orders were created for panel order CBC auto differential.  Procedure                               Abnormality         Status                     ---------                               -----------         ------                     CBC with Differential[[id number]]       Abnormal            Final result                 Please view results for these tests on the individual orders.   EXTRA TUBES    Narrative:     The following orders were created for panel order EXTRA TUBES.  Procedure                               Abnormality         Status                     ---------                               -----------         ------                     Light Green Top Hold[[id number]]                            Final result                Gold Top Hold[[id number]]                                   Final result                 Please view results for these tests on the individual orders.   LIGHT GREEN TOP HOLD    Extra Tube Hold for add-ons.     GOLD TOP HOLD    Extra Tube Hold for add-ons.     CBC W/ AUTO DIFFERENTIAL    Narrative:     The following orders were created for panel order CBC auto differential.  Procedure                               Abnormality         Status                     ---------                               -----------         ------                     CBC with Differential[[id number]]       Abnormal            Final result                 Please view results for these tests on the individual orders.          Imaging Results              X-Ray Chest AP Portable (Final result)  Result time 06/07/25 11:24:01      Final result by Laurent Norton MD (06/07/25 11:24:01)                   Impression:      Postoperative and chronic appearing changes of the lungs.  No acute intrathoracic abnormality.      Electronically signed by: Laurent Norton  Date:    06/07/2025  Time:    11:24               Narrative:    EXAMINATION:  XR CHEST AP PORTABLE    CLINICAL HISTORY:  Chest Pain;    TECHNIQUE:  Single frontal view of the chest was performed.    COMPARISON:  Chest radiograph 01/24/2025    FINDINGS:  Postoperative change along the right lung apex.  Hypoventilatory exam.  Coarsened interstitial lung markings as well as suspected bibasilar atelectasis.  Probable trace left pleural effusion.  No new consolidation, worsening pleural effusion, or pneumothorax.    Cardiomediastinal silhouette is unchanged in size.  Mediastinal structures are midline.    Osseous structures demonstrate no evidence for acute fracture or osseous destructive lesion.  Degenerative change.    No free intra-abdominal air.  Soft tissues are unremarkable.                                       Medications   heparin 25,000 units in dextrose 5% 250 mL (100 units/mL)  infusion LOW INTENSITY nomogram - [facility] (10 Units/kg/hr × 81.6 kg Intravenous Restarted 6/8/25 0903)   heparin 25,000 units in dextrose 5% (100 units/ml) IV bolus from bag LOW INTENSITY nomogram - [facility] (has no administration in time range)   heparin 25,000 units in dextrose 5% (100 units/ml) IV bolus from bag LOW INTENSITY nomogram - [facility] (has no administration in time range)   sodium chloride 0.9% flush 10 mL (has no administration in time range)   naloxone 0.4 mg/mL injection 0.02 mg (has no administration in time range)   glucose chewable tablet 16 g (has no administration in time range)   glucose chewable tablet 24 g (has no administration in time range)   dextrose 50% injection 12.5 g (has no administration in time range)   dextrose 50% injection 25 g (has no administration in time range)   glucagon (human recombinant) injection 1 mg (has no administration in time range)   carvediloL tablet 12.5 mg (12.5 mg Oral Given 6/8/25 0829)   levothyroxine tablet 75 mcg (75 mcg Oral Given 6/8/25 0510)   atorvastatin tablet 40 mg (40 mg Oral Given 6/7/25 2000)   hydrOXYzine pamoate capsule 25 mg (25 mg Oral Given 6/7/25 2135)   clopidogreL tablet 75 mg (0 mg Oral Hold 6/8/25 0900)   perflutren protein-A microsphr 0.22 mg/mL IV susp (has no administration in time range)   aspirin EC tablet 325 mg (325 mg Oral Given 6/7/25 0952)   heparin 25,000 units in dextrose 5% (100 units/ml) IV bolus from bag LOW INTENSITY nomogram - [facility] (3,990 Units Intravenous Bolus from Bag 6/7/25 1355)   potassium bicarbonate disintegrating tablet 50 mEq (50 mEq Oral Given 6/7/25 1359)     Medical Decision Making  Differential Diagnosis includes, but is not limited to:  ACS/MI, PE, aortic dissection, pneumothorax, cardiac tamponade, pericarditis/myocarditis, pneumonia, infection/abscess, lung mass, trauma/fracture, costochondritis/pleurisy, MSK pain/contusion, GERD, biliary disease, pancreatitis, anemia      Amount and/or Complexity of Data  Reviewed  Labs: ordered. Decision-making details documented in ED Course.  Radiology: ordered.    Risk  OTC drugs.  Prescription drug management.  Decision regarding hospitalization.  Diagnosis or treatment significantly limited by social determinants of health.  Risk Details: Patient is a 64-year-old male history of cardiovascular disease with stents presenting today with chest pain.  Patient's troponin is up from his baseline.  Labs remarkable for hyperkalemia without EKG change.  Patient was given oral potassium.  Patient was evaluated by Cardiology, will plan to admit patient for NSTEMI.    Critical Care  Total time providing critical care: 30 minutes               ED Course as of 25 0931   Sat 2025   0939 EKG: Rate 82.  Sinus rhythm with sinus arrhythmia.  Left bundle-branch block.  Negative STEMI criteria by [facility].  QTC prolongation [RN]   1134 CBC auto differential(!) [RN]   1134 BNP(!) [RN]   1134 APTT(!) [RN]   1134 Protime-INR(!) [RN]   1134 Comprehensive metabolic panel(!!) [RN]   1146 Discussed with Dr. Salaazr who will evaluate the patient shortly.  Will plan to admit, recommends starting heparin. [RN]   1406 Notified the patient's AV fistula who is bleeding in the setting of heparin infusion.  Primary team at bedside.  Heparin has been discontinued.  Will obtain stat CBC and PTT [RN]      ED Course User Index  [RN] Jeramy Fink Jr., MD                           Clinical Impression:  Final diagnoses:  [R07.9] Chest pain  [I21.4] NSTEMI (non-ST elevated myocardial infarction) (Primary)          ED Disposition Condition    Admit                Portions of this note were dictated using voice recognition software and may contain dictation related errors in spelling/grammar/syntax not found on text review             [1]   Social History  Tobacco Use    Smoking status: Former     Current packs/day: 0.00     Types: Cigarettes     Quit date: 1999     Years since quittin.1      Passive exposure: Never    Smokeless tobacco: Never   Substance Use Topics    Alcohol use: No     Alcohol/week: 0.0 standard drinks of alcohol    Drug use: No        Jeramy Fink Jr., MD  06/08/25 0950

## 2025-06-07 NOTE — ASSESSMENT & PLAN NOTE
Patient has long standing persistent (>12 months) atrial fibrillation. Patient is currently in sinus rhythm. [id number] Score: 1. The patients heart rate in the last 24 hours is as follows:  Pulse  Min: 75  Max: 87     Antiarrhythmics       Anticoagulants  heparin 25,000 units in dextrose 5% 250 mL (100 units/mL) infusion LOW INTENSITY nomogram - [facility], Continuous, Intravenous  heparin 25,000 units in dextrose 5% (100 units/ml) IV bolus from bag LOW INTENSITY nomogram - [facility], As needed (PRN), Intravenous  heparin 25,000 units in dextrose 5% (100 units/ml) IV bolus from bag LOW INTENSITY nomogram - [facility], As needed (PRN), Intravenous    Plan  - Replete lytes with a goal of K>4, Mg >2  - Patient is anticoagulated, see medications listed above.  - Patient's afib is currently controlled  -

## 2025-06-07 NOTE — ASSESSMENT & PLAN NOTE
Patient with chest pain worsened while on HD  Trops are flat 0.14  EKG with no ischemic changes  Echo with worsening wall motion as per cardio  Started on heparin drip  Cont DAPT and statin  Seen by cardiology, plan for Left heart cath on Monday   If patient developed chest pain again, please contact

## 2025-06-07 NOTE — H&P
[facility] Medicine  History & Physical    Patient Name: Domingo Gross  MRN: [id number]  Patient Class: IP- Inpatient  Admission Date: 6/7/2025  Attending Physician: Nima Ervin MD   Primary Care Provider: Geeta Coffey MD         Patient information was obtained from patient and ER records.     Subjective:     Principal Problem:NSTEMI (non-ST elevated myocardial infarction)    Chief Complaint:   Chief Complaint   Patient presents with    Chest Pain     Patient reports chest pain and shortness of breath that started last night and worsened while at dialysis today. He reports hx of MI and multiple stents. Patient took 2 nitro last night and 1 nitro while at dialysis this morning with little relief.         HPI: 64 y.o. male with PMH ESRD on HD, PAD with multiple interventions, CAD (mid LAD/prox RCA PCI 3/2019 and prox LCA in 10/2019 following out of hospital cardiac arrest), after cardiac arrest in setting of prox LCX occlusion treated with PCI, 9/16/2024 s/p intervention w cutting/shockwave PTCA to Lcx, 01/24/25 PCI of OM, new reduction in EF presents to ER with substernal chest pain that radiates to his throat. Report his chest pain feels like heaviness as if something is heavy sitting on his chest, started last night at rest, he took sublingual nitro which only slightly helped but he was still feeling it, today as he was having HD the pain got worse so he came to the ED.    Patient denies smoking or alcohol use        Past Medical History:   Diagnosis Date    Acute congestive heart failure 09/13/2024    Allergy     Anemia     Anticoagulant long-term use     Arthritis     CKD (chronic kidney disease) stage 4, GFR 15-29 ml/min     Closed fracture of transverse process of lumbar vertebra 02/16/2024    COPD (chronic obstructive pulmonary disease) 08/20/2021    Coronary artery disease     Heart attack 10/04/2019    Hematuria     Hemothorax     Hyperlipidemia     Hypertension      Hyperuricemia     Hypocalcemia     Hypokalemia     Hypophosphatemia     Hypothyroidism 03/02/2023    PAD (peripheral artery disease)     Proteinuria     Vitamin D deficiency        Past Surgical History:   Procedure Laterality Date    ABDOMINAL AORTOGRAPHY N/A 5/10/2019    Procedure: AORTOGRAM-ABDOMINAL;  Surgeon: Keo Jenkins MD;  Location: [facility] CATH LAB/EP;  Service: Cardiology;  Laterality: N/A;  RSFA intervention     ANGIOGRAM, CORONARY, WITH LEFT HEART CATHETERIZATION N/A 1/24/2025    Procedure: Angiogram, Coronary, with Left Heart Cath;  Surgeon: Valente Moran MD;  Location: [facility] CATH LAB/EP;  Service: Cardiology;  Laterality: N/A;    AORTOGRAPHY WITH SERIALOGRAPHY N/A 12/3/2021    Procedure: AORTOGRAM, WITH SERIALOGRAPHY;  Surgeon: Keo Jenkins MD;  Location: [facility] CATH LAB/EP;  Service: Cardiology;  Laterality: N/A;    AORTOGRAPHY WITH SERIALOGRAPHY N/A 4/1/2022    Procedure: AORTOGRAM, WITH SERIALOGRAPHY;  Surgeon: Keo Jenkins MD;  Location: [facility] CATH LAB/EP;  Service: Cardiology;  Laterality: N/A;    ATHERECTOMY, CORONARY N/A 4/25/2023    Procedure: Atherectomy-coronary;  Surgeon: Keo Jenkins MD;  Location: [facility] CATH LAB/EP;  Service: Cardiology;  Laterality: N/A;    ATHERECTOMY, CORONARY N/A 1/24/2025    Procedure: Atherectomy-coronary;  Surgeon: Valente Moran MD;  Location: [facility] CATH LAB/EP;  Service: Cardiology;  Laterality: N/A;    BONE MARROW BIOPSY Right 10/12/2021    Procedure: BIOPSY-BONE MARROW;  Surgeon: Naseem Woods MD;  Location: [facility] OR;  Service: Oncology;  Laterality: Right;    CARDIAC CATHETERIZATION      CATARACT EXTRACTION      CATARACT EXTRACTION W/  INTRAOCULAR LENS IMPLANT Right 6/8/2020    Procedure: EXTRACTION, CATARACT, WITH IOL INSERTION;  Surgeon: Tin Light MD;  Location: [facility] OR;  Service: Ophthalmology;  Laterality: Right;    CATARACT EXTRACTION W/  INTRAOCULAR LENS IMPLANT Left 7/2/2020    Procedure: EXTRACTION, CATARACT, WITH IOL INSERTION;   Surgeon: Tin Light MD;  Location: [facility];  Service: Ophthalmology;  Laterality: Left;    COLONOSCOPY N/A 1/6/2022    Procedure: COLONOSCOPY;  Surgeon: Kathe Penaloza MD;  Location: [facility] ENDO (2ND FLR);  Service: Endoscopy;  Laterality: N/A;  COVID test at [facility] on 1/3-GT  okay to hold Plavix for 5 days and aspirin per Dr. Becerra  2nd floor due toextensive cardiac history   instructions mailed and my [facility] portal -     CORONARY ANGIOGRAPHY N/A 3/29/2019    Procedure: ANGIOGRAM, CORONARY ARTERY;  Surgeon: Keo Jenkins MD;  Location: [facility] CATH LAB/EP;  Service: Cardiology;  Laterality: N/A;    CORONARY ANGIOGRAPHY Left 10/11/2019    Procedure: ANGIOGRAM, CORONARY ARTERY;  Surgeon: Keo Jenkins MD;  Location: [facility] CATH LAB/EP;  Service: Cardiology;  Laterality: Left;    CORONARY ANGIOGRAPHY N/A 4/10/2023    Procedure: ANGIOGRAM, CORONARY ARTERY;  Surgeon: Keo Jenkins MD;  Location: [facility] CATH LAB/EP;  Service: Cardiology;  Laterality: N/A;    CORONARY ANGIOGRAPHY N/A 6/26/2024    Procedure: ANGIOGRAM, CORONARY ARTERY;  Surgeon: Enoch Tirado MD;  Location: [facility] CATH LAB/EP;  Service: Cardiology;  Laterality: N/A;    CORONARY ANGIOGRAPHY N/A 9/16/2024    Procedure: ANGIOGRAM, CORONARY ARTERY;  Surgeon: Enoch Tirado MD;  Location: [facility] CATH LAB/EP;  Service: Cardiology;  Laterality: N/A;    CORONARY ANGIOPLASTY WITH STENT PLACEMENT  03/29/2019    mid and distal RCA    CORONARY ANGIOPLASTY WITH STENT PLACEMENT N/A 1/24/2025    Procedure: ANGIOPLASTY, CORONARY ARTERY, WITH STENT INSERTION;  Surgeon: Valente Moran MD;  Location: [facility] CATH LAB/EP;  Service: Cardiology;  Laterality: N/A;    ESOPHAGOGASTRODUODENOSCOPY N/A 1/6/2022    Procedure: EGD (ESOPHAGOGASTRODUODENOSCOPY);  Surgeon: Kathe Penaloza MD;  Location: [facility] ENDO (2ND FLR);  Service: Endoscopy;  Laterality: N/A;    EYE SURGERY      INSERTION OF TUNNELED CENTRAL VENOUS HEMODIALYSIS CATHETER N/A 2/9/2024    Procedure:  INSERTION, CATHETER, HEMODIALYSIS, DUAL LUMEN;  Surgeon: Wang Mendieta MD;  Location: [facility] OR;  Service: General;  Laterality: N/A;    INSTANTANEOUS WAVE-FREE RATIO (IFR) N/A 4/25/2023    Procedure: Instantaneous Wave-Free Ratio (IFR);  Surgeon: Keo Jenkins MD;  Location: [facility] CATH LAB/EP;  Service: Cardiology;  Laterality: N/A;    INTRAVASCULAR ULTRASOUND, NON-CORONARY  8/12/2022    Procedure: Intravascular Ultrasound, Non-Coronary;  Surgeon: Keo Jenkins MD;  Location: [facility] CATH LAB/EP;  Service: Cardiology;;    IVUS, CORONARY  4/25/2023    Procedure: IVUS, Coronary;  Surgeon: Keo Jenkins MD;  Location: [facility] CATH LAB/EP;  Service: Cardiology;;    IVUS, CORONARY  5/16/2023    Procedure: IVUS, Coronary;  Surgeon: Keo Jenkins MD;  Location: [facility] CATH LAB/EP;  Service: Cardiology;;  CX    IVUS, CORONARY  1/24/2025    Procedure: IVUS, Coronary;  Surgeon: Valente Moran MD;  Location: [facility] CATH LAB/EP;  Service: Cardiology;;    LEFT HEART CATHETERIZATION N/A 3/29/2019    Procedure: Left heart cath;  Surgeon: Keo Jenkins MD;  Location: [facility] CATH LAB/EP;  Service: Cardiology;  Laterality: N/A;    LEFT HEART CATHETERIZATION Left 10/8/2019    Procedure: Left heart cath;  Surgeon: Keo Jenkins MD;  Location: [facility] CATH LAB/EP;  Service: Cardiology;  Laterality: Left;    LEFT HEART CATHETERIZATION Left 4/10/2023    Procedure: Left heart cath;  Surgeon: Keo Jenkins MD;  Location: [facility] CATH LAB/EP;  Service: Cardiology;  Laterality: Left;    LEFT HEART CATHETERIZATION Left 4/25/2023    Procedure: Left heart cath;  Surgeon: Keo Jenkins MD;  Location: [facility] CATH LAB/EP;  Service: Cardiology;  Laterality: Left;    LEFT HEART CATHETERIZATION Left 5/16/2023    Procedure: Left heart cath;  Surgeon: Keo Jenkins MD;  Location: [facility] CATH LAB/EP;  Service: Cardiology;  Laterality: Left;    LEFT HEART CATHETERIZATION Left 6/26/2024    Procedure: Left heart cath;  Surgeon: Enoch Tirado MD;   Location: [facility] CATH LAB/EP;  Service: Cardiology;  Laterality: Left;    LEFT HEART CATHETERIZATION Left 9/16/2024    Procedure: Left heart cath;  Surgeon: Enoch Tirado MD;  Location: [facility] CATH LAB/EP;  Service: Cardiology;  Laterality: Left;    LITHOTRIPSY, CORONARY TRANSLUMINAL, PERCUTANEOUS  9/16/2024    Procedure: LITHOTRIPSY, CORONARY TRANSLUMINAL, PERCUTANEOUS;  Surgeon: Enoch Tirado MD;  Location: [facility] CATH LAB/EP;  Service: Cardiology;;    PERCUTANEOUS CORONARY INTERVENTION, ARTERY N/A 6/26/2024    Procedure: Percutaneous coronary intervention;  Surgeon: Enoch Tirado MD;  Location: [facility] CATH LAB/EP;  Service: Cardiology;  Laterality: N/A;    PERCUTANEOUS TRANSLUMINAL ANGIOPLASTY (PTA) OF PERIPHERAL VESSEL N/A 7/12/2019    Procedure: PTA, PERIPHERAL VESSEL;  Surgeon: Keo Jenkins MD;  Location: [facility] CATH LAB/EP;  Service: Cardiology;  Laterality: N/A;    PERCUTANEOUS TRANSLUMINAL ANGIOPLASTY (PTA) OF PERIPHERAL VESSEL Left 5/20/2022    Procedure: PTA, PERIPHERAL VESSEL;  Surgeon: Keo Jenkins MD;  Location: [facility] CATH LAB/EP;  Service: Cardiology;  Laterality: Left;    PERCUTANEOUS TRANSLUMINAL ANGIOPLASTY (PTA) OF PERIPHERAL VESSEL Right 8/12/2022    Procedure: PTA, PERIPHERAL VESSEL;  Surgeon: Keo Jenkins MD;  Location: [facility] CATH LAB/EP;  Service: Cardiology;  Laterality: Right;    PERCUTANEOUS TRANSLUMINAL BALLOON ANGIOPLASTY OF CORONARY ARTERY  4/25/2023    Procedure: Angioplasty-coronary;  Surgeon: Keo Jenkins MD;  Location: [facility] CATH LAB/EP;  Service: Cardiology;;    PLACEMENT OF ARTERIOVENOUS GRAFT Left 4/15/2024    Procedure: INSERTION, GRAFT, ARTERIOVENOUS;  Surgeon: Scott Pascual MD;  Location: [facility] OR;  Service: General;  Laterality: Left;    PTCA, SINGLE VESSEL  5/16/2023    Procedure: PTCA, Single Vessel;  Surgeon: Keo Jenkins MD;  Location: [facility] CATH LAB/EP;  Service: Cardiology;;  CX    PTCA, SINGLE VESSEL  6/26/2024    Procedure: PTCA, Single  Vessel;  Surgeon: Enoch Tirado MD;  Location: [facility] CATH LAB/EP;  Service: Cardiology;;    PTCA, SINGLE VESSEL Left 9/16/2024    Procedure: PTCA, Single Vessel;  Surgeon: Enoch Tirado MD;  Location: [facility] CATH LAB/EP;  Service: Cardiology;  Laterality: Left;    REMOVAL OF HEMODIALYSIS CATHETER Right 2/9/2024    Procedure: REMOVAL, CATHETER, HEMODIALYSIS;  Surgeon: Wang Mendieta MD;  Location: [facility] OR;  Service: General;  Laterality: Right;    REMOVAL OF TUNNELED CENTRAL VENOUS CATHETER (CVC) Right 5/20/2024    Procedure: REMOVAL, CATHETER, CENTRAL VENOUS, TUNNELED;  Surgeon: Scott Pascual MD;  Location: [facility] OR;  Service: General;  Laterality: Right;    REPAIR, CHRONIC TOTAL OCCLUSION, CORONARY  6/26/2024    Procedure: Repair, Chronic Total Occlusion, Coronary;  Surgeon: Enoch Tirado MD;  Location: [facility] CATH LAB/EP;  Service: Cardiology;;    STENT, DRUG ELUTING, SINGLE VESSEL, CORONARY  4/25/2023    Procedure: Stent, Drug Eluting, Single Vessel, Coronary;  Surgeon: Keo Jenkins MD;  Location: [facility] CATH LAB/EP;  Service: Cardiology;;    STENT, DRUG ELUTING, SINGLE VESSEL, CORONARY  5/16/2023    Procedure: Stent, Drug Eluting, Single Vessel, Coronary;  Surgeon: Keo Jenkins MD;  Location: [facility] CATH LAB/EP;  Service: Cardiology;;  CX    TRANSESOPHAGEAL ECHOCARDIOGRAM WITH POSSIBLE CARDIOVERSION (JOSE W/ POSS CARDIOVERSION) N/A 6/19/2023    Procedure: Transesophageal echo (JOSE) intra-procedure log documentation;  Surgeon: Keo Jenkins MD;  Location: [facility] CATH LAB/EP;  Service: Cardiology;  Laterality: N/A;       Review of patient's allergies indicates:   Allergen Reactions    Ace inhibitors Rash       Current Facility-Administered Medications on File Prior to Encounter   Medication    sodium chloride 0.9% flush 10 mL     Current Outpatient Medications on File Prior to Encounter   Medication Sig    albuterol-ipratropium (DUO-NEB) 2.5 mg-0.5 mg/3 mL nebulizer solution Use 1  vial by nebulization every 6 (six) hours as needed for Wheezing or Shortness of Breath (or cough). Rescue    apixaban (ELIQUIS) 5 mg Tab Take 1 tablet (5 mg total) by mouth 2 (two) times daily.    carvediloL (COREG) 12.5 MG tablet Take 1 tablet (12.5 mg total) by mouth 2 (two) times daily with meals.    clopidogreL (PLAVIX) 75 mg tablet Take 1 tablet (75 mg total) by mouth once daily.    coenzyme Q10 100 mg capsule Take 1 capsule (100 mg total) by mouth once daily.    eplerenone (INSPRA) 50 MG Tab Take 50 mg by mouth once daily.    hydrALAZINE (APRESOLINE) 50 MG tablet Take 1 tablet (50 mg total) by mouth every 12 (twelve) hours.    hydrOXYzine pamoate (VISTARIL) 25 MG Cap Take 1 capsule (25 mg total) by mouth nightly as needed (Sleep).    rosuvastatin (CRESTOR) 40 MG Tab Take 1 tablet (40 mg total) by mouth every evening.    levothyroxine (SYNTHROID) 75 MCG tablet Take 1 tablet (75 mcg total) by mouth before breakfast.    nitroGLYCERIN (NITROSTAT) 0.4 MG SL tablet Place 1 tablet (0.4 mg total) under the tongue every 5 (five) minutes as needed for Chest pain (for a max of 3 tabs in 15 minutes).    ranolazine (RANEXA) 1,000 mg Tb12 Take 1 tablet (1,000 mg total) by mouth 2 (two) times daily.    [DISCONTINUED] molnupiravir (LAGEVRIO, EUA, ORAL) Take by mouth.     Family History       Problem Relation (Age of Onset)    Aneurysm Mother    Cancer Father    Diabetes Sister    Heart disease Mother    Hypertension Mother, Sister    No Known Problems Brother, Son          Tobacco Use    Smoking status: Former     Current packs/day: 0.00     Types: Cigarettes     Quit date: 1999     Years since quittin.1     Passive exposure: Never    Smokeless tobacco: Never   Substance and Sexual Activity    Alcohol use: No     Alcohol/week: 0.0 standard drinks of alcohol    Drug use: No    Sexual activity: Yes     Partners: Female     Review of Systems   Constitutional: Negative.    HENT: Negative.     Respiratory:  Positive for  chest tightness.    Cardiovascular:  Positive for chest pain.   Gastrointestinal: Negative.    Genitourinary: Negative.    Musculoskeletal: Negative.    Skin: Negative.    Neurological: Negative.    Psychiatric/Behavioral: Negative.       Objective:     Vital Signs (Most Recent):  Temp: 98 °F (36.7 °C) (06/07/25 0930)  Pulse: 80 (06/07/25 1400)  Resp: (!) 25 (06/07/25 1400)  BP: (!) 117/58 (06/07/25 1400)  SpO2: (!) 93 % (06/07/25 1400) Vital Signs (24h Range):  Temp:  [98 °F (36.7 °C)] 98 °F (36.7 °C)  Pulse:  [75-80] 80  Resp:  [18-25] 25  SpO2:  [92 %-96 %] 93 %  BP: (113-118)/(55-68) 117/58     Weight: 81.6 kg (180 lb)  Body mass index is 25.83 kg/m².     Physical Exam  Constitutional:       Appearance: He is ill-appearing.   HENT:      Head: Normocephalic and atraumatic.   Cardiovascular:      Rate and Rhythm: Normal rate.   Pulmonary:      Effort: Pulmonary effort is normal.      Breath sounds: Normal breath sounds.   Abdominal:      Palpations: Abdomen is soft.   Skin:     General: Skin is warm.   Neurological:      General: No focal deficit present.      Mental Status: He is alert. Mental status is at baseline.   Psychiatric:         Mood and Affect: Mood normal.         Behavior: Behavior normal.                Significant Labs: All pertinent labs within the past 24 hours have been reviewed.    Significant Imaging: I have reviewed all pertinent imaging results/findings within the past 24 hours.  Assessment/Plan:     Assessment & Plan  Primary hypertension  Patient's blood pressure range in the last 24 hours was: BP  Min: 108/67  Max: 118/55.The patient's inpatient anti-hypertensive regimen is listed below:  Current Antihypertensives  carvediloL tablet 12.5 mg, 2 times daily with meals, Oral    Plan  - BP is controlled, no changes needed to their regimen    Hyperlipidemia    Continue statin  Anemia due to chronic kidney disease, on chronic dialysis  Anemia is likely due to chronic disease due to ESRD. Most  recent hemoglobin and hematocrit are listed below.  Recent Labs     06/07/25  0948 06/07/25  1428   HGB 12.3* 12.7*   HCT 36.6* 37.3*     Plan  - Monitor serial CBC: Daily  - Transfuse PRBC if patient becomes hemodynamically unstable, symptomatic or H/H drops below 7/21.  - Patient has not received any PRBC transfusions to date  - Patient's anemia is currently stable    Hypothyroidism  Cont levothyroxine     Paroxysmal atrial fibrillation  Patient has long standing persistent (>12 months) atrial fibrillation. Patient is currently in sinus rhythm. [id number] Score: 1. The patients heart rate in the last 24 hours is as follows:  Pulse  Min: 75  Max: 87     Antiarrhythmics       Anticoagulants  heparin 25,000 units in dextrose 5% 250 mL (100 units/mL) infusion LOW INTENSITY nomogram - [facility], Continuous, Intravenous  heparin 25,000 units in dextrose 5% (100 units/ml) IV bolus from bag LOW INTENSITY nomogram - [facility], As needed (PRN), Intravenous  heparin 25,000 units in dextrose 5% (100 units/ml) IV bolus from bag LOW INTENSITY nomogram - [facility], As needed (PRN), Intravenous    Plan  - Replete lytes with a goal of K>4, Mg >2  - Patient is anticoagulated, see medications listed above.  - Patient's afib is currently controlled  -      NSTEMI (non-ST elevated myocardial infarction)  Patient with chest pain worsened while on HD  Trops are flat 0.14  EKG with no ischemic changes  Echo with worsening wall motion as per cardio  Started on heparin drip  Cont DAPT and statin  Seen by cardiology, plan for Left heart cath on Monday   If patient developed chest pain again, please contact      HFrEF (heart failure with reduced ejection fraction)    Repeat echo showed   Left Ventricle: The left ventricle is moderately dilated. Mildly increased wall thickness. There is eccentric hypertrophy. Regional wall motion abnormalities present. See diagram for wall motion findings. There is moderately reduced systolic function with a  visually estimated ejection fraction of 35 - 40%. Quantitated ejection fraction is 38%. Unable to assess diastolic function due to poor image quality.    Right Ventricle: The right ventricle is normal in size Systolic function is normal. TAPSE is 2.3 cm.    Overall the study quality was technically difficult.  ESRD (end stage renal disease)  Creatine stable for now. BMP reviewed- noted Estimated Creatinine Clearance: 19 mL/min (A) (based on SCr of 4.1 mg/dL (H)). according to latest data. Based on current GFR, CKD stage is end stage.  Monitor UOP and serial BMP and adjust therapy as needed. Renally dose meds. Avoid nephrotoxic medications and procedures.    ESRD On HD,    VTE Risk Mitigation (From admission, onward)           Ordered     heparin 25,000 units in dextrose 5% (100 units/ml) IV bolus from bag LOW INTENSITY nomogram - [facility]  As needed (PRN)        Question:  Heparin Infusion Adjustment (DO NOT MODIFY ANSWER)  Answer:  [email or web address]\epic\Images\Pharmacy\HeparinInfusions\heparin LOW INTENSITY nomogram for [facility] [id number]    06/07/25 1147     heparin 25,000 units in dextrose 5% (100 units/ml) IV bolus from bag LOW INTENSITY nomogram - [facility]  As needed (PRN)        Question:  Heparin Infusion Adjustment (DO NOT MODIFY ANSWER)  Answer:  \\[email or web address]\epic\Images\Pharmacy\HeparinInfusions\heparin LOW INTENSITY nomogram for [facility] [id number]    06/07/25 1147     heparin 25,000 units in dextrose 5% 250 mL (100 units/mL) infusion LOW INTENSITY nomogram - [facility]  Continuous        Question:  Begin at (units/kg/hr)  Answer:  12    06/07/25 1147     IP VTE HIGH RISK PATIENT  Once         06/07/25 1155     Place sequential compression device  Until discontinued         06/07/25 1155                                    Nima Ervin MD  Department of Hospital Medicine  [place] [facility]

## 2025-06-07 NOTE — HPI
64 y.o. male with PMH ESRD on HD, PAD with multiple interventions, CAD (mid LAD/prox RCA PCI 3/2019 and prox LCA in 10/2019 following out of hospital cardiac arrest), after cardiac arrest in setting of prox LCX occlusion treated with PCI, 9/16/2024 s/p intervention w cutting/shockwave PTCA to Lcx, 01/24/25 PCI of OM, new reduction in EF presents to ER with substernal chest pain that radiates to his throat. Report his chest pain feels like heaviness as if something is heavy sitting on his chest, started last night at rest, he took sublingual nitro which only slightly helped but he was still feeling it, today as he was having HD the pain got worse so he came to the ED.    Patient denies smoking or alcohol use

## 2025-06-07 NOTE — CONSULTS
[facility] Consult Note     Attending Physician: Nima Ervin MD  Cardiology Attending Physician: Enoch Bray MD  Date of Admit: 6/7/2025  Reason for Consult: chest pain       History of Present Illness:       Domingo Gross is a pleasant 64 y.o. male with PMH ESRD on HD, PAD with multiple interventions, CAD (mid LAD/prox RCA PCI 3/2019 and prox LCA in 10/2019 following out of hospital cardiac arrest), after cardiac arrest in setting of prox LCX occlusion treated with PCI, 9/16/2024 s/p intervention w cutting/shockwave PTCA to Lcx, 01/24/25 PCI of OM, new reduction in EF presents to ER with substernal chest pain that radiates to his throat. Reports his chest pain started last night which did not improved with nitro. Trops with significant delta from baseline. Concerning symptoms given extensive cardiovascular history.       History obtained by patient interview and supplemented by nursing documentation and chart review.   Objective   PMHx:  has a past medical history of Acute congestive heart failure (09/13/2024), Allergy, Anemia, Anticoagulant long-term use, Arthritis, CKD (chronic kidney disease) stage 4, GFR 15-29 ml/min, Closed fracture of transverse process of lumbar vertebra (02/16/2024), COPD (chronic obstructive pulmonary disease) (08/20/2021), Coronary artery disease, Heart attack (10/04/2019), Hematuria, Hemothorax, Hyperlipidemia, Hypertension, Hyperuricemia, Hypocalcemia, Hypokalemia, Hypophosphatemia, Hypothyroidism (03/02/2023), PAD (peripheral artery disease), Proteinuria, and Vitamin D deficiency.   SurgHx:  has a past surgical history that includes Eye surgery; Cardiac catheterization; Left heart catheterization (N/A, 3/29/2019); Coronary angiography (N/A, 3/29/2019); Abdominal aortography (N/A, 5/10/2019); Percutaneous transluminal angioplasty (PTA) of peripheral vessel (N/A, 7/12/2019); Coronary angioplasty with stent (03/29/2019); Left heart catheterization (Left,  10/8/2019); Coronary angiography (Left, 10/11/2019); Cataract extraction; Cataract extraction w/  intraocular lens implant (Right, 6/8/2020); Cataract extraction w/  intraocular lens implant (Left, 7/2/2020); Bone marrow biopsy (Right, 10/12/2021); Aortography with serialography (N/A, 12/3/2021); Esophagogastroduodenoscopy (N/A, 1/6/2022); Colonoscopy (N/A, 1/6/2022); Aortography with serialography (N/A, 4/1/2022); Percutaneous transluminal angioplasty (PTA) of peripheral vessel (Left, 5/20/2022); Percutaneous transluminal angioplasty (PTA) of peripheral vessel (Right, 8/12/2022); intravascular ultrasound, non-coronary (8/12/2022); Left heart catheterization (Left, 4/10/2023); Coronary angiography (N/A, 4/10/2023); Left heart catheterization (Left, 4/25/2023); instantaneous wave-free ratio (ifr) (N/A, 4/25/2023); atherectomy, coronary (N/A, 4/25/2023); Percutaneous transluminal balloon angioplasty of coronary artery (4/25/2023); stent, drug eluting, single vessel, coronary (4/25/2023); ivus, coronary (4/25/2023); Left heart catheterization (Left, 5/16/2023); ivus, coronary (5/16/2023); ptca, single vessel (5/16/2023); stent, drug eluting, single vessel, coronary (5/16/2023); transesophageal echocardiogram with possible cardioversion (julian w/ poss cardioversion) (N/A, 6/19/2023); Insertion of tunneled central venous hemodialysis catheter (N/A, 2/9/2024); Removal of hemodialysis catheter (Right, 2/9/2024); Placement of arteriovenous graft (Left, 4/15/2024); Removal of tunneled central venous catheter (CVC) (Right, 5/20/2024); Left heart catheterization (Left, 6/26/2024); percutaneous coronary intervention, artery (N/A, 6/26/2024); ptca, single vessel (6/26/2024); repair, chronic total occlusion, coronary (6/26/2024); Coronary angiography (N/A, 6/26/2024); Coronary angiography (N/A, 9/16/2024); Left heart catheterization (Left, 9/16/2024); ptca, single vessel (Left, 9/16/2024); lithotripsy, coronary transluminal,  "percutaneous (2024); ANGIOGRAM, CORONARY, WITH LEFT HEART CATHETERIZATION (N/A, 2025); ivus, coronary (2025); Coronary angioplasty with stent (N/A, 2025); and atherectomy, coronary (N/A, 2025).   FamHx: family history includes Aneurysm in his mother; Cancer in his father; Diabetes in his sister; Heart disease in his mother; Hypertension in his mother and sister; No Known Problems in his brother and son.   SocialHx:  reports that he quit smoking about 26 years ago. His smoking use included cigarettes. He has never been exposed to tobacco smoke. He has never used smokeless tobacco. He reports that he does not drink alcohol and does not use drugs.  Home Meds:  Current Outpatient Medications   Medication Instructions    albuterol-ipratropium (DUO-NEB) 2.5 mg-0.5 mg/3 mL nebulizer solution Use 1 vial by nebulization every 6 (six) hours as needed for Wheezing or Shortness of Breath (or cough). Rescue    apixaban (ELIQUIS) 5 mg, Oral, 2 times daily    carvediloL (COREG) 12.5 mg, Oral, 2 times daily with meals    clopidogreL (PLAVIX) 75 mg, Oral, Daily    coenzyme Q10 100 mg, Oral, Daily    eplerenone (INSPRA) 50 mg, Daily    hydrALAZINE (APRESOLINE) 50 mg, Oral, Every 12 hours    hydrOXYzine pamoate (VISTARIL) 25 mg, Oral, Nightly PRN    levothyroxine (SYNTHROID) 75 mcg, Oral, Before breakfast    nitroGLYCERIN (NITROSTAT) 0.4 mg, Sublingual, Every 5 min PRN    ranolazine (RANEXA) 1,000 mg, Oral, 2 times daily    rosuvastatin (CRESTOR) 40 mg, Oral, Nightly     Review of Systems: Comprehensive ROS was performed and is negative unless otherwise noted in HPI.     Objective:   Last 24 Hour Vital Signs:  BP  Min: 113/68  Max: 118/55  Temp  Av °F (36.7 °C)  Min: 98 °F (36.7 °C)  Max: 98 °F (36.7 °C)  Pulse  Av  Min: 75  Max: 77  Resp  Av.5  Min: 18  Max: 23  SpO2  Av %  Min: 92 %  Max: 96 %  Height  Av' 10" (177.8 cm)  Min: 5' 10" (177.8 cm)  Max: 5' 10" (177.8 cm)  Weight  Avg: " 81.6 kg (180 lb)  Min: 81.6 kg (180 lb)  Max: 81.6 kg (180 lb)  No intake/output data recorded.  Physical Examination:  Constitutional: NAD, Atraumatic   HEENT: MMM  Cardiovascular: RRR, no murmurs noted, no chest tenderness to palpation, 2+ radial pulses b/l  Pulmonary: normal respiratory effort, CTAB, no crackles, wheezes  Abdominal: S/NT/ND  Musculoskeletal: No lower extremity edema noted  Neurological: Alert & oriented to self, location, time and situation. No gross neurological deficits  Skin: W/D/I  Psych: Appropriate affect, normal mood    Laboratory:  Personally reviewed    Other Results:  TTE:  Results for orders placed during the hospital encounter of 06/03/25    Echo    Interpretation Summary    Left Ventricle: The left ventricle is mildly dilated. Normal wall thickness. Regional wall motion abnormalities and Global hypokinesis present. See diagram for wall motion findings. There is moderately reduced systolic function with a visually estimated ejection fraction of 35 - 40%. Quantitated ejection fraction is 35%. Grade I diastolic dysfunction.    Right Ventricle: The right ventricle is mildly dilated Systolic function is normal.    Left Atrium: The left atrium is mildly dilated    Right Atrium: The right atrium is normal in size.    Mitral Valve: There is mild to moderate regurgitation.    Tricuspid Valve: There is trace regurgitation.    Aorta: The aortic root is mildly dilated measuring 3.94 cm.    Pulmonary Artery: The estimated pulmonary artery systolic pressure is 37 mmHg.    IVC/SVC: Normal venous pressure at 3 mmHg.    Pericardium: There is no pericardial effusion.    Stress Testing:   Results for orders placed during the hospital encounter of 03/18/24    Treadmill Stress Test    Interpretation Summary    The ECG portion of the study is negative for ischemia.    The patient reported no chest pain during the stress test.    There were no arrhythmias during stress.    The nuclear portion of this study  will be reported separately.     Coronary Angiogram:  Results for orders placed during the hospital encounter of 01/22/25    Cardiac catheterization    Conclusion  Cath Results:  Access:  Right ulnar artery with 6-7 [place]i slender sheath  LM:  YADIRA III flow mild luminal irregularities  LAD: YADIRA III flow patent prox/mid stent with mild disease 30% proximal and mild disease in the mid segment 30-40%.  LCx:  YADIRA 0 flow.  100% Occluded proximal stent into OM 1 and into mid circ.  Prior bifurcation stent. Reduced to 0% post intervention  prox lcx into OM1.  RCA: YADIRA III flow patent stents prox and mid with mild disease distally 10-20%.  There is right-to-left collaterals from the RCA to distal and mid circ  Dominance codominant    LVgram: LVEDP 11 mm Hg    Intervention:  Status post successful laser atherectomy using 0.9 mm fiber  Status post successful PTCA of prox left circumflex into OM1 with 3.5 mm by 12 REZA post dilated with 3.5 NC balloon at high pressure IVUS guided with excellent results.  Decided not to intervene in the mid circumflex bifurcation stent due to severe recurrent restenosis and already having collaterals from the RCA supplying the mid/distal left circumflex    During the procedure the patient developed hypotension.  Developed seizure activity from the hypotension which resolved after given Cayden-Synephrine and improvement in the blood pressure.    Closure device:  Vasc band  Patient tolerated procedure well, no complications    Assessment:  Severe single-vessel coronary disease as above  Status post successful PTCA and stenting to the proximal left circumflex into OM1    Plan:  Continue aspirin and Plavix without interruption  Continue high-intensity statin.  LDL goal lower than 70  Maximize medical therapy  Risk factors reduction    The procedure log was documented by Documenter: Kimberly Stoddard RT; Ene Weaver RN and verified by Valente Moran MD.    Date: 1/25/2025  Time: 7:21 AMThe  complexity of this procedure required 200% of the typical effort, expertise, and associated risk for this type of endovascular intervention due to  complex anatomy and recurrent multiple failure of the prior stents requiring atherectomy as documented.    The procedure log was documented by Documenter: Kimberly Stoddard RT; Ene Weaver RN and verified by Valente Moran MD.    Date: 1/25/2025  Time: 7:39 AM      -Reviewing Medical records & lab results  -Independently reviewing and interpreting (if not documented by myself) EKGs, echocardiograms, catherizations   -Obtaining a history, performing a relevant exam, counseling/educating the patient/family  -Documenting clinical information in the EMR including ordering of tests  -Care coordination and communicating with other health care providers        Assessment/Plan:     Active Hospital Problems    Diagnosis    HFrEF (heart failure with reduced ejection fraction)    ESRD (end stage renal disease)    NSTEMI (non-ST elevated myocardial infarction)         CAD with multiple interventions/ NSTEMI: start heparin gtt, hold eliquis, continue DAPT, statin, bb  HFrEF - continue BB, will continue to optimize GDMT   Afib per EP- holding eliquis, continue heparin   ESRD on HD- please consult nephro    -NPO Sunday MN FOR CATH+/-PCI Monday. If refractory chest pain despite meds contact me - low threshold for cath.   -Trend troponin      Thank you for the opportunity to care for this patient. Please don't hesitate to reach out with any questions/concerns,    Enoch Bray MD  Interventional Cardiology  [facility]

## 2025-06-07 NOTE — ASSESSMENT & PLAN NOTE
Anemia is likely due to chronic disease due to ESRD. Most recent hemoglobin and hematocrit are listed below.  Recent Labs     06/07/25  0948 06/07/25  1428   HGB 12.3* 12.7*   HCT 36.6* 37.3*     Plan  - Monitor serial CBC: Daily  - Transfuse PRBC if patient becomes hemodynamically unstable, symptomatic or H/H drops below 7/21.  - Patient has not received any PRBC transfusions to date  - Patient's anemia is currently stable

## 2025-06-08 NOTE — NURSING
New dressing placed by dialysis bleeding though dressing pressure to area and new dressing applied

## 2025-06-08 NOTE — ASSESSMENT & PLAN NOTE
Patient's blood pressure range in the last 24 hours was: BP  Min: 100/65  Max: 132/76.The patient's inpatient anti-hypertensive regimen is listed below:  Current Antihypertensives  carvediloL tablet 12.5 mg, 2 times daily with meals, Oral    Plan  - BP is controlled, no changes needed to their regimen

## 2025-06-08 NOTE — PROGRESS NOTES
[facility] Progress Note     Attending Physician: Nima Ervin MD  Cardiology Attending Physician: Enoch Bray MD  Date of Admit: 2025      Subjective/Interval History:      Pt seen this am, remarkable for bleeding from his AVF and nose- following heparin gtt. Chest pain free.      Objective:     Last 24 Hour Vital Signs:  BP  Min: 100/65  Max: 132/76  Temp  Av.3 °F (36.8 °C)  Min: 97.8 °F (36.6 °C)  Max: 98.6 °F (37 °C)  Pulse  Av.6  Min: 68  Max: 82  Resp  Av.4  Min: 18  Max: 20  SpO2  Av %  Min: 93 %  Max: 95 %  I/O last 3 completed shifts:  In: 220 [P.O.:220]  Out: -     Physical Examination:  Constitutional: NAD, Atraumatic   HEENT: MMM  Cardiovascular: RRR, no murmurs noted, no chest tenderness to palpation, 2+ radial pulses b/l  Pulmonary: normal respiratory effort, CTAB, no crackles, wheezes  Abdominal: S/NT/ND  Musculoskeletal: No lower extremity edema noted  Neurological: Alert & oriented to self, location, time and situation. No gross neurological deficits  Skin: W/D/I  Psych: Appropriate affect, normal mood    Laboratory/Radiographic/Cardiac Data:  Personally Reviewed      Assessment/Plan:     Patient Active Problem List    Diagnosis Date Noted    HFrEF (heart failure with reduced ejection fraction) 2025    ESRD (end stage renal disease) 2025    Thrombocytopenia, unspecified 2025    Hypokalemia 2025    Elevated troponin 2025    Insomnia 2024    Hyperparathyroidism 2024    S/P drug eluting coronary stent placement 2024    Atherosclerosis of native coronary artery of native heart with unstable angina pectoris 2024    Congestive heart failure 2024    Chronic anticoagulation 2024    NSTEMI (non-ST elevated myocardial infarction) 2024    Hemodialysis catheter dysfunction 2024    S/P arteriovenous (AV) graft placement 2024    Membranous nephropathy determined by biopsy 2024     ESRD (end stage renal disease) on dialysis 02/16/2024    Paroxysmal atrial fibrillation 08/24/2023    Hypothyroidism 03/02/2023    Unstable angina pectoris 03/02/2023    Anemia due to chronic kidney disease, on chronic dialysis 12/15/2021    COPD (chronic obstructive pulmonary disease) 08/20/2021    Nuclear sclerosis, bilateral 06/08/2020    Nephrotic range proteinuria 04/06/2020    Bilateral carotid artery stenosis     Coronary artery disease 03/29/2019    Venous insufficiency of both lower extremities 06/04/2018    Atherosclerosis of native artery of both lower extremities with intermittent claudication 03/02/2018    Hyperlipidemia 06/12/2015    DJD (degenerative joint disease), lumbar 05/27/2015    Claudication in peripheral vascular disease 06/13/2014    PAD (peripheral artery disease) 05/21/2014    Primary hypertension 04/29/2013          CAD with multiple interventions/ NSTEMI: start heparin gtt, hold eliquis, continue DAPT, statin, bb  HFrEF - continue BB, will continue to optimize GDMT   Afib per EP- holding eliquis, continue heparin   ESRD on HD- please consult nephro     -NPO Sunday MN FOR CATH+/-PCI Monday. If refractory chest pain despite meds contact me - low threshold for cath.   -Trend troponin    Enoch Bray M.D.  Interventional Cardiology   [facility]    -Medical records & lab results  -Independently reviewing and interpreting (if not documented by myself) EKGs, echocardiograms, catherizations   -Obtaining a history, performing a relevant exam, counseling/educating the patient/family  -Documenting clinical information in the EMR including ordering of tests  -Care coordination and communicating with other health care professionals

## 2025-06-08 NOTE — PROGRESS NOTES
[facility] Medicine  Progress Note    Patient Name: Domingo Gross  MRN: [id number]  Patient Class: IP- Inpatient   Admission Date: 6/7/2025  Length of Stay: 1 days  Attending Physician: Nima Ervin MD  Primary Care Provider: Geeta Coffey MD        Subjective     Principal Problem:NSTEMI (non-ST elevated myocardial infarction)        HPI:  64 y.o. male with PMH ESRD on HD, PAD with multiple interventions, CAD (mid LAD/prox RCA PCI 3/2019 and prox LCA in 10/2019 following out of hospital cardiac arrest), after cardiac arrest in setting of prox LCX occlusion treated with PCI, 9/16/2024 s/p intervention w cutting/shockwave PTCA to Lcx, 01/24/25 PCI of OM, new reduction in EF presents to ER with substernal chest pain that radiates to his throat. Report his chest pain feels like heaviness as if something is heavy sitting on his chest, started last night at rest, he took sublingual nitro which only slightly helped but he was still feeling it, today as he was having HD the pain got worse so he came to the ED.    Patient denies smoking or alcohol use        Overview/Hospital Course:  No notes on file    Interval History:     I have seen and examined the patient   today    Patient feels okay, chest pain is stable    Patient was having bleeding from his AVF this morning, hepatin drip was held, PTT was checked and it was therapeutic so it was restarted. Will monitor closely for rebleeding.    Nephro team were concerned about AVF being too tightly wrapped for the bleeding so dressing was changed by HD nurse.      Review of Systems   Constitutional: Negative.    HENT: Negative.     Respiratory: Negative.     Cardiovascular: Negative.    Gastrointestinal: Negative.    Genitourinary: Negative.    Musculoskeletal: Negative.    Skin: Negative.    Neurological: Negative.    Psychiatric/Behavioral: Negative.       Objective:     Vital Signs (Most Recent):  Temp: 97.8 °F (36.6 °C) (06/08/25 0734)  Pulse:  75 (06/08/25 0829)  Resp: 18 (06/08/25 0734)  BP: 130/70 (06/08/25 0829)  SpO2: (!) 94 % (06/08/25 0734) Vital Signs (24h Range):  Temp:  [97.8 °F (36.6 °C)-98.6 °F (37 °C)] 97.8 °F (36.6 °C)  Pulse:  [68-87] 75  Resp:  [18-25] 18  SpO2:  [92 %-96 %] 94 %  BP: (100-132)/(55-76) 130/70     Weight: 80.8 kg (178 lb 2.1 oz)  Body mass index is 25.56 kg/m².    Intake/Output Summary (Last 24 hours) at 6/8/2025 1107  Last data filed at 6/7/2025 2058  Gross per 24 hour   Intake 220 ml   Output --   Net 220 ml         Physical Exam  Constitutional:       Appearance: He is ill-appearing.   HENT:      Head: Normocephalic and atraumatic.   Cardiovascular:      Rate and Rhythm: Normal rate.   Pulmonary:      Effort: Pulmonary effort is normal.      Breath sounds: Normal breath sounds.   Skin:     General: Skin is warm.   Neurological:      General: No focal deficit present.      Mental Status: He is alert. Mental status is at baseline.   Psychiatric:         Mood and Affect: Mood normal.         Behavior: Behavior normal.               Significant Labs: All pertinent labs within the past 24 hours have been reviewed.    Significant Imaging: I have reviewed all pertinent imaging results/findings within the past 24 hours.      Assessment & Plan  Primary hypertension  Patient's blood pressure range in the last 24 hours was: BP  Min: 100/65  Max: 132/76.The patient's inpatient anti-hypertensive regimen is listed below:  Current Antihypertensives  carvediloL tablet 12.5 mg, 2 times daily with meals, Oral    Plan  - BP is controlled, no changes needed to their regimen    Hyperlipidemia    Continue statin  Anemia due to chronic kidney disease, on chronic dialysis  Anemia is likely due to chronic disease due to ESRD. Most recent hemoglobin and hematocrit are listed below.  Recent Labs     06/07/25  0948 06/07/25  1428 06/08/25  0813   HGB 12.3* 12.7* 10.5*   HCT 36.6* 37.3* 31.2*     Plan  - Monitor serial CBC: Daily  - Transfuse PRBC if  patient becomes hemodynamically unstable, symptomatic or H/H drops below 7/21.  - Patient has not received any PRBC transfusions to date  - Patient's anemia is currently stable    Hypothyroidism  Cont levothyroxine     Paroxysmal atrial fibrillation  Patient has long standing persistent (>12 months) atrial fibrillation. Patient is currently in sinus rhythm. [id number] Score: 1. The patients heart rate in the last 24 hours is as follows:  Pulse  Min: 68  Max: 87     Antiarrhythmics       Anticoagulants  heparin 25,000 units in dextrose 5% 250 mL (100 units/mL) infusion LOW INTENSITY nomogram - [facility], Continuous, Intravenous  heparin 25,000 units in dextrose 5% (100 units/ml) IV bolus from bag LOW INTENSITY nomogram - [facility], As needed (PRN), Intravenous  heparin 25,000 units in dextrose 5% (100 units/ml) IV bolus from bag LOW INTENSITY nomogram - [facility], As needed (PRN), Intravenous    Plan  - Replete lytes with a goal of K>4, Mg >2  - Patient is anticoagulated, see medications listed above.  - Patient's afib is currently controlled  -      NSTEMI (non-ST elevated myocardial infarction)  Patient with chest pain worsened while on HD  Trops are flat 0.14  EKG with no ischemic changes  Echo with worsening wall motion as per cardio  Started on heparin drip  Cont DAPT and statin  Seen by cardiology, plan for Left heart cath on Monday   If patient developed chest pain again, please contact      HFrEF (heart failure with reduced ejection fraction)    Repeat echo showed   Left Ventricle: The left ventricle is moderately dilated. Mildly increased wall thickness. There is eccentric hypertrophy. Regional wall motion abnormalities present. See diagram for wall motion findings. There is moderately reduced systolic function with a visually estimated ejection fraction of 35 - 40%. Quantitated ejection fraction is 38%. Unable to assess diastolic function due to poor image quality.    Right Ventricle: The right ventricle is  normal in size Systolic function is normal. TAPSE is 2.3 cm.    Overall the study quality was technically difficult.  ESRD (end stage renal disease)  Creatine stable for now. BMP reviewed- noted Estimated Creatinine Clearance: 11.7 mL/min (A) (based on SCr of 6.7 mg/dL (H)). according to latest data. Based on current GFR, CKD stage is end stage.  Monitor UOP and serial BMP and adjust therapy as needed. Renally dose meds. Avoid nephrotoxic medications and procedures.    ESRD On HD,  VTE Risk Mitigation (From admission, onward)           Ordered     heparin 25,000 units in dextrose 5% (100 units/ml) IV bolus from bag LOW INTENSITY nomogram - [facility]  As needed (PRN)        Question:  Heparin Infusion Adjustment (DO NOT MODIFY ANSWER)  Answer:  [email or web address]\Images\[email or web address]\HeparinInfusions\heparin LOW INTENSITY nomogram for [facility] [id number]    06/07/25 1147     heparin 25,000 units in dextrose 5% (100 units/ml) IV bolus from bag LOW INTENSITY nomogram - [facility]  As needed (PRN)        Question:  Heparin Infusion Adjustment (DO NOT MODIFY ANSWER)  Answer:  [email or web address]\[email or web address]\heparin LOW INTENSITY nomogram for [facility] [id number]    06/07/25 1147     heparin 25,000 units in dextrose 5% 250 mL (100 units/mL) infusion LOW INTENSITY nomogram - [facility]  Continuous        Question:  Begin at (units/kg/hr)  Answer:  12    06/07/25 1147     IP VTE HIGH RISK PATIENT  Once         06/07/25 1155     Place sequential compression device  Until discontinued         06/07/25 1155                    Discharge Planning   FLACO:      Code Status: Full Code   Medical Readiness for Discharge Date:                            Nima Ervin MD  Department of Hospital Medicine   [facility]

## 2025-06-08 NOTE — CONSULTS
Nephrology Consult   Note     Consult Requested By: Nima Ervin MD  Reason for Consult: ESRD     SUBJECTIVE:      History of Present Illness:  Domingo Gross is a 64 y.o.   male who  has a past medical history of Acute congestive heart failure (09/13/2024), Allergy, Anemia, Anticoagulant long-term use, Arthritis, CKD (chronic kidney disease) stage 4, GFR 15-29 ml/min, Closed fracture of transverse process of lumbar vertebra (02/16/2024), COPD (chronic obstructive pulmonary disease) (08/20/2021), Coronary artery disease, Heart attack (10/04/2019), Hematuria, Hemothorax, Hyperlipidemia, Hypertension, Hyperuricemia, Hypocalcemia, Hypokalemia, Hypophosphatemia, Hypothyroidism (03/02/2023), PAD (peripheral artery disease), Proteinuria, and Vitamin D deficiency.. The patient presented to the [facility] on 6/7/2025 with a primary complaint of CP on HD.    ?    Review of Systems   Constitutional:  Negative for chills and fever.   HENT:  Negative for congestion and sore throat.    Eyes:  Negative for blurred vision, double vision and photophobia.   Respiratory:  Negative for cough and shortness of breath.    Cardiovascular:  Positive for chest pain. Negative for palpitations, orthopnea and leg swelling.   Gastrointestinal:  Negative for abdominal pain, diarrhea, nausea and vomiting.   Genitourinary:  Negative for dysuria and urgency.   Musculoskeletal:  Negative for joint pain and myalgias.   Skin:  Negative for itching and rash.   Neurological:  Negative for dizziness, sensory change, weakness and headaches.   Endo/Heme/Allergies:  Negative for polydipsia. Does not bruise/bleed easily.   Psychiatric/Behavioral:  Negative for depression.        Past Medical History:   Diagnosis Date    Acute congestive heart failure 09/13/2024    Allergy     Anemia     Anticoagulant long-term use     Arthritis     CKD (chronic kidney disease) stage 4, GFR 15-29 ml/min     Closed fracture of transverse process of lumbar  vertebra 02/16/2024    COPD (chronic obstructive pulmonary disease) 08/20/2021    Coronary artery disease     Heart attack 10/04/2019    Hematuria     Hemothorax     Hyperlipidemia     Hypertension     Hyperuricemia     Hypocalcemia     Hypokalemia     Hypophosphatemia     Hypothyroidism 03/02/2023    PAD (peripheral artery disease)     Proteinuria     Vitamin D deficiency           OBJECTIVE:     Vital Signs (Most Recent)  Vitals:    06/08/25 1124 06/08/25 1209 06/08/25 1610 06/08/25 1616   BP: 119/74   127/67   BP Location: Right arm   Right arm   Patient Position: Lying   Lying   Pulse: 72 72 76 76   Resp: 18   18   Temp: 98.1 °F (36.7 °C)   98.3 °F (36.8 °C)   TempSrc: Oral   Oral   SpO2: 95%   95%   Weight:       Height:                       Medications:   atorvastatin  40 mg Oral QHS    carvediloL  12.5 mg Oral BID WM    clopidogreL  75 mg Oral Daily    levothyroxine  75 mcg Oral Before breakfast    perflutren protein-a microsphr  0.5 mL Intravenous Once             Physical Exam  Vitals and nursing note reviewed.   Constitutional:       General: He is not in acute distress.     Appearance: He is not diaphoretic.   HENT:      Head: Normocephalic and atraumatic.      Mouth/Throat:      Pharynx: No oropharyngeal exudate.   Eyes:      General: No scleral icterus.     Conjunctiva/sclera: Conjunctivae normal.      Pupils: Pupils are equal, round, and reactive to light.   Cardiovascular:      Rate and Rhythm: Normal rate and regular rhythm.      Heart sounds: Normal heart sounds. No murmur heard.  Pulmonary:      Effort: Pulmonary effort is normal. No respiratory distress.      Breath sounds: No rales.   Abdominal:      General: Bowel sounds are normal. There is no distension.      Palpations: Abdomen is soft.      Tenderness: There is no abdominal tenderness.   Musculoskeletal:         General: Normal range of motion.      Cervical back: Normal range of motion and neck supple.      Right lower leg: No edema.       Left lower leg: No edema.      Comments: PEE ANN    Skin:     General: Skin is warm and dry.      Findings: No erythema.   Neurological:      Mental Status: He is alert and oriented to person, place, and time.      Cranial Nerves: No cranial nerve deficit.      Comments:     Psychiatric:         Mood and Affect: Affect normal.         Cognition and Memory: Memory normal.         Judgment: Judgment normal.         Laboratory:  Recent Labs   Lab 06/07/25  1428 06/08/25  0813 06/08/25  1510   WBC 12.52 9.80 8.33   HGB 12.7* 10.5* 10.4*   HCT 37.3* 31.2* 30.0*   * 134* 120*   MONO 7.4  0.93 8.4  0.82 7.6  0.63     Recent Labs   Lab 06/07/25  0948 06/08/25  0813 06/08/25  1510    131* 131*   K 2.7* 2.8* 3.7   CL 89* 86* 85*   CO2 28 24 27   BUN 21 44* 52*   CREATININE 4.1* 6.7* 7.5*   CALCIUM 8.3* 7.6* 7.4*   PHOS  --  6.9*  --        Diagnostic Results:  X-Ray: Reviewed  US: Reviewed  Echo: Reviewed    Left Ventricle: The left ventricle is mildly dilated. There is eccentric hypertrophy. Regional wall motion abnormalities present. See diagram for wall motion findings. There is mildly reduced systolic function with a visually estimated ejection fraction of 45 - 50%. There is indeterminate diastolic function.Elevated left ventricular filling pressure.    Right Ventricle: Normal right ventricular cavity size. Wall thickness is normal. Systolic function is normal.    Left Atrium: Left atrium is mildly dilated.    Right Atrium: Right atrium is mildly dilated.    Mitral Valve: There is mild to moderate regurgitation.    Pulmonic Valve: There is moderate regurgitation.    Pulmonary Artery: There is pulmonary hypertension. The estimated pulmonary artery systolic pressure is 47 mmHg.    IVC/SVC: Intermediate venous pressure at 8 mmHg.  ASSESSMENT/PLAN:     ESRD on HD TTS with Dr Loly Payne  in [facility]   Completed full dialysis yesterday prior to coming to emergency room.  Electrolytes and volume  acceptable.  Plan for dialysis on Tuesday as regular schedule         2. HTN   controlled       3. 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 MBD - PTH at goal   Replace magnesium  Sevelamer with each meal  Ergocalciferol 75667 units weekly  Recent Labs   Lab 06/04/25  1629 06/07/25  0948 06/08/25  0813 06/08/25  1510   .8*  --   --   --    CALCIUM 7.0*   < > 7.6* 7.4*   PHOS  --   --  6.9*  --     < > = values in this interval not displayed.     Recent Labs   Lab 06/08/25  0813   MG 1.5*       Lab Results   Component Value Date    .8 (H) 06/04/2025    CALCIUM 7.4 (L) 06/08/2025    CAION 1.31 07/14/2020    PHOS 6.9 (H) 06/08/2025     Lab Results   Component Value Date    QSDVICSY81LF 9 (L) 03/14/2024     Acidosis - correct with HD   Lab Results   Component Value Date    CO2 27 06/08/2025     Hemodialysis access-left upper arm loop AV graft.  Patient was bleeding from the graft yesterday after starting on heparin.  Patient also has a nosebleed.  Please do not apply any circumferential compressing dressing on the arm.  I will ask dialysis nurse to come in retail keep access.  Okay to use ice packs around.  Fistula can not be compressed.    Nutrition/Hypoalbuminemia    Recent Labs   Lab 06/07/25  0948 06/08/25  0813   ALBUMIN 3.4* 2.8*     [place] with meals TID.       Thank you for the consult, will follow  With any question please call [phone]  Alexia Yarbrough MD    [facility]    ESPERANZA Payne MD,   MD BRANDY Arredondo MD E. V. Harmon, NP    [address]  [place], [address]  [phone]

## 2025-06-08 NOTE — SUBJECTIVE & OBJECTIVE
Interval History:     I have seen and examined the patient   today    Patient feels okay, chest pain is stable    Patient was having bleeding from his AVF this morning, hepatin drip was held, PTT was checked and it was therapeutic so it was restarted. Will monitor closely for rebleeding.    Nephro team were concerned about AVF being too tightly wrapped for the bleeding so dressing was changed by HD nurse.      Review of Systems   Constitutional: Negative.    HENT: Negative.     Respiratory: Negative.     Cardiovascular: Negative.    Gastrointestinal: Negative.    Genitourinary: Negative.    Musculoskeletal: Negative.    Skin: Negative.    Neurological: Negative.    Psychiatric/Behavioral: Negative.       Objective:     Vital Signs (Most Recent):  Temp: 97.8 °F (36.6 °C) (06/08/25 0734)  Pulse: 75 (06/08/25 0829)  Resp: 18 (06/08/25 0734)  BP: 130/70 (06/08/25 0829)  SpO2: (!) 94 % (06/08/25 0734) Vital Signs (24h Range):  Temp:  [97.8 °F (36.6 °C)-98.6 °F (37 °C)] 97.8 °F (36.6 °C)  Pulse:  [68-87] 75  Resp:  [18-25] 18  SpO2:  [92 %-96 %] 94 %  BP: (100-132)/(55-76) 130/70     Weight: 80.8 kg (178 lb 2.1 oz)  Body mass index is 25.56 kg/m².    Intake/Output Summary (Last 24 hours) at 6/8/2025 1107  Last data filed at 6/7/2025 2058  Gross per 24 hour   Intake 220 ml   Output --   Net 220 ml         Physical Exam  Constitutional:       Appearance: He is ill-appearing.   HENT:      Head: Normocephalic and atraumatic.   Cardiovascular:      Rate and Rhythm: Normal rate.   Pulmonary:      Effort: Pulmonary effort is normal.      Breath sounds: Normal breath sounds.   Skin:     General: Skin is warm.   Neurological:      General: No focal deficit present.      Mental Status: He is alert. Mental status is at baseline.   Psychiatric:         Mood and Affect: Mood normal.         Behavior: Behavior normal.               Significant Labs: All pertinent labs within the past 24 hours have been reviewed.    Significant Imaging:  I have reviewed all pertinent imaging results/findings within the past 24 hours.

## 2025-06-08 NOTE — NURSING
During rounds noted patient fistula to his left arm was noted to have a moderate about of blood on dressing, gown and bed.  Heparin stopped MD notified and dressing re enforced.     MD to bedside labs ordered and heparin on hold until results.  At 0903 aptt results 49.3 theraputic level called to MD lutz to restart heparin   On call Nephro at bedside wanted pressure dressing remove by dialysis nurse and new dressing place  Lab orders in per MD   Will continue to monitor the dressing to fistula for bleeding

## 2025-06-08 NOTE — ASSESSMENT & PLAN NOTE
Creatine stable for now. BMP reviewed- noted Estimated Creatinine Clearance: 11.7 mL/min (A) (based on SCr of 6.7 mg/dL (H)). according to latest data. Based on current GFR, CKD stage is end stage.  Monitor UOP and serial BMP and adjust therapy as needed. Renally dose meds. Avoid nephrotoxic medications and procedures.    ESRD On HD,

## 2025-06-08 NOTE — ASSESSMENT & PLAN NOTE
>>ASSESSMENT AND PLAN FOR ESRD (END STAGE RENAL DISEASE) WRITTEN ON 6/8/2025 11:14 AM BY ERICH FRAZIER MD    Creatine stable for now. BMP reviewed- noted Estimated Creatinine Clearance: 11.7 mL/min (A) (based on SCr of 6.7 mg/dL (H)). according to latest data. Based on current GFR, CKD stage is end stage.  Monitor UOP and serial BMP and adjust therapy as needed. Renally dose meds. Avoid nephrotoxic medications and procedures.    ESRD On HD,

## 2025-06-08 NOTE — ASSESSMENT & PLAN NOTE
Anemia is likely due to chronic disease due to ESRD. Most recent hemoglobin and hematocrit are listed below.  Recent Labs     06/07/25  0948 06/07/25  1428 06/08/25  0813   HGB 12.3* 12.7* 10.5*   HCT 36.6* 37.3* 31.2*     Plan  - Monitor serial CBC: Daily  - Transfuse PRBC if patient becomes hemodynamically unstable, symptomatic or H/H drops below 7/21.  - Patient has not received any PRBC transfusions to date  - Patient's anemia is currently stable

## 2025-06-08 NOTE — ASSESSMENT & PLAN NOTE
Patient has long standing persistent (>12 months) atrial fibrillation. Patient is currently in sinus rhythm. [id number] Score: 1. The patients heart rate in the last 24 hours is as follows:  Pulse  Min: 68  Max: 87     Antiarrhythmics       Anticoagulants  heparin 25,000 units in dextrose 5% 250 mL (100 units/mL) infusion LOW INTENSITY nomogram - [facility], Continuous, Intravenous  heparin 25,000 units in dextrose 5% (100 units/ml) IV bolus from bag LOW INTENSITY nomogram - [facility], As needed (PRN), Intravenous  heparin 25,000 units in dextrose 5% (100 units/ml) IV bolus from bag LOW INTENSITY nomogram - [facility], As needed (PRN), Intravenous    Plan  - Replete lytes with a goal of K>4, Mg >2  - Patient is anticoagulated, see medications listed above.  - Patient's afib is currently controlled  -

## 2025-06-09 PROBLEM — I50.20 HFREF (HEART FAILURE WITH REDUCED EJECTION FRACTION): Status: ACTIVE | Noted: 2024-09-13

## 2025-06-09 PROBLEM — I25.110 ATHEROSCLEROSIS OF NATIVE CORONARY ARTERY OF NATIVE HEART WITH UNSTABLE ANGINA PECTORIS: Status: ACTIVE | Noted: 2019-03-29

## 2025-06-09 PROBLEM — I21.4 NSTEMI (NON-ST ELEVATED MYOCARDIAL INFARCTION): Status: ACTIVE | Noted: 2025-06-09

## 2025-06-09 PROBLEM — I25.2 HISTORY OF MI (MYOCARDIAL INFARCTION): Status: ACTIVE | Noted: 2024-01-01

## 2025-06-09 PROBLEM — J96.01 ACUTE HYPOXEMIC RESPIRATORY FAILURE: Status: ACTIVE | Noted: 2025-06-09

## 2025-06-09 NOTE — SIGNIFICANT EVENT
Patient developed recurrent cardiac arrests. Patient was coded accordingly to the ACLS protocol.    On examination, patient is posturing and pupils are dilated not reactive to light.    The MICU team, engaged the wife in a voluntary conversation about advance care planning and we specifically addressed what the goals of care would be moving forward, in light of the patient's change in clinical status, specifically worsening recurrent cardiac arrest with findings concerning for anoxic brain injury.  We did specifically address the patient's likely prognosis, which is poor.      Patient is DNR.         Lizz Anderson MD  PCCM Fellow, [facility]

## 2025-06-09 NOTE — HPI
64 year old man with history of ESRD on HD, PAD, CAD status post PCI, cardiac arrest, heart failure that presents to the ED on 06/07 for evaluation of chest pain. Patient was admitted for NSTEMI and started on heparin drip and DAPT. Course was complicated by code blue this morning, no pulse. Patient was coded accordingly to ACLS protocol, ROSC in 15 mins. Patient was intubated. Patient was transferred to ICU. Post ROSC, pupils are dilated and patient posturing. Patient was started on TTM. Patient developed recurrent cardiac arrest x2 in the ICU and ROSC in less than 5 mins.

## 2025-06-09 NOTE — SUBJECTIVE & OBJECTIVE
"Tisha Arias is a 60 y.o. female who is being evaluated via a billable telephone visit.      The patient has been notified of following:     \"This telephone visit will be conducted via a call between you and your physician/provider. We have found that certain health care needs can be provided without the need for a physical exam.  This service lets us provide the care you need with a short phone conversation.  If a prescription is necessary we can send it directly to your pharmacy.  If lab work is needed we can place an order for that and you can then stop by our lab to have the test done at a later time.    Telephone visits are billed at different rates depending on your insurance coverage. During this emergency period, for some insurers they may be billed the same as an in-person visit.  Please reach out to your insurance provider with any questions.    If during the course of the call the physician/provider feels a telephone visit is not appropriate, you will not be charged for this service.\"    Patient has given verbal consent to a Telephone visit? Yes    Patient would like to receive their AVS by AVS Preference: Mine.     Pain score 10  Constant  What does your pain like feel during a flare? Deep, dull, aching, crushing pain, and muscle cramps  Does the pain interfere with:  Work ----- yes  Walking ------ yes  Sleep ------- yes  Daily activities ------yes  Relationships -------yes  F=8      Tisha Arias complains of    Chief Complaint   Patient presents with     Shoulder Pain     Groin Pain     Back Pain     Hip Pain     Patient here to follow up with Thanh Berry MD for all over body pain. F=8    Jeanette Maldonado CMA    " Past Medical History:   Diagnosis Date    Acute congestive heart failure 09/13/2024    Allergy     Anemia     Anticoagulant long-term use     Arthritis     CKD (chronic kidney disease) stage 4, GFR 15-29 ml/min     Closed fracture of transverse process of lumbar vertebra 02/16/2024    COPD (chronic obstructive pulmonary disease) 08/20/2021    Coronary artery disease     Heart attack 10/04/2019    Hematuria     Hemothorax     Hyperlipidemia     Hypertension     Hyperuricemia     Hypocalcemia     Hypokalemia     Hypophosphatemia     Hypothyroidism 03/02/2023    PAD (peripheral artery disease)     Proteinuria     Vitamin D deficiency        Past Surgical History:   Procedure Laterality Date    ABDOMINAL AORTOGRAPHY N/A 5/10/2019    Procedure: AORTOGRAM-ABDOMINAL;  Surgeon: Keo Jenkins MD;  Location: [facility] CATH LAB/EP;  Service: Cardiology;  Laterality: N/A;  RSFA intervention     ANGIOGRAM, CORONARY, WITH LEFT HEART CATHETERIZATION N/A 1/24/2025    Procedure: Angiogram, Coronary, with Left Heart Cath;  Surgeon: Valente Moran MD;  Location: [facility] CATH LAB/EP;  Service: Cardiology;  Laterality: N/A;    AORTOGRAPHY WITH SERIALOGRAPHY N/A 12/3/2021    Procedure: AORTOGRAM, WITH SERIALOGRAPHY;  Surgeon: Keo Jenkins MD;  Location: [facility] CATH LAB/EP;  Service: Cardiology;  Laterality: N/A;    AORTOGRAPHY WITH SERIALOGRAPHY N/A 4/1/2022    Procedure: AORTOGRAM, WITH SERIALOGRAPHY;  Surgeon: Keo Jenkins MD;  Location: [facility] CATH LAB/EP;  Service: Cardiology;  Laterality: N/A;    ATHERECTOMY, CORONARY N/A 4/25/2023    Procedure: Atherectomy-coronary;  Surgeon: Keo Jenknis MD;  Location: [facility] CATH LAB/EP;  Service: Cardiology;  Laterality: N/A;    ATHERECTOMY, CORONARY N/A 1/24/2025    Procedure: Atherectomy-coronary;  Surgeon: Valente Moran MD;  Location: [facility] CATH LAB/EP;  Service: Cardiology;  Laterality: N/A;    BONE MARROW BIOPSY Right 10/12/2021    Procedure: BIOPSY-BONE MARROW;  Surgeon: Naseem  MD Chuck;  Location: [facility] OR;  Service: Oncology;  Laterality: Right;    CARDIAC CATHETERIZATION      CATARACT EXTRACTION      CATARACT EXTRACTION W/  INTRAOCULAR LENS IMPLANT Right 6/8/2020    Procedure: EXTRACTION, CATARACT, WITH IOL INSERTION;  Surgeon: Tin Light MD;  Location: [facility] OR;  Service: Ophthalmology;  Laterality: Right;    CATARACT EXTRACTION W/  INTRAOCULAR LENS IMPLANT Left 7/2/2020    Procedure: EXTRACTION, CATARACT, WITH IOL INSERTION;  Surgeon: Tin Light MD;  Location: [facility] OR;  Service: Ophthalmology;  Laterality: Left;    COLONOSCOPY N/A 1/6/2022    Procedure: COLONOSCOPY;  Surgeon: Kathe Penaloza MD;  Location: [facility] ENDO (2ND FLR);  Service: Endoscopy;  Laterality: N/A;  COVID test at [facility] on 1/3-GT  okay to hold Plavix for 5 days and aspirin per Dr. Becerra  2nd floor due toextensive cardiac history   instructions mailed and my [facility] portal -     CORONARY ANGIOGRAPHY N/A 3/29/2019    Procedure: ANGIOGRAM, CORONARY ARTERY;  Surgeon: Keo Jenkins MD;  Location: [facility] CATH LAB/EP;  Service: Cardiology;  Laterality: N/A;    CORONARY ANGIOGRAPHY Left 10/11/2019    Procedure: ANGIOGRAM, CORONARY ARTERY;  Surgeon: Keo Jenkins MD;  Location: [facility] CATH LAB/EP;  Service: Cardiology;  Laterality: Left;    CORONARY ANGIOGRAPHY N/A 4/10/2023    Procedure: ANGIOGRAM, CORONARY ARTERY;  Surgeon: Keo Jenkins MD;  Location: [facility] CATH LAB/EP;  Service: Cardiology;  Laterality: N/A;    CORONARY ANGIOGRAPHY N/A 6/26/2024    Procedure: ANGIOGRAM, CORONARY ARTERY;  Surgeon: Enoch Tirado MD;  Location: [facility] CATH LAB/EP;  Service: Cardiology;  Laterality: N/A;    CORONARY ANGIOGRAPHY N/A 9/16/2024    Procedure: ANGIOGRAM, CORONARY ARTERY;  Surgeon: Enoch Tirado MD;  Location: [facility] CATH LAB/EP;  Service: Cardiology;  Laterality: N/A;    CORONARY ANGIOPLASTY WITH STENT PLACEMENT  03/29/2019    mid and distal RCA    CORONARY ANGIOPLASTY WITH STENT PLACEMENT N/A 1/24/2025     Procedure: ANGIOPLASTY, CORONARY ARTERY, WITH STENT INSERTION;  Surgeon: Valente Moran MD;  Location: [facility] CATH LAB/EP;  Service: Cardiology;  Laterality: N/A;    ESOPHAGOGASTRODUODENOSCOPY N/A 1/6/2022    Procedure: EGD (ESOPHAGOGASTRODUODENOSCOPY);  Surgeon: Kathe Penaloza MD;  Location: [address]);  Service: Endoscopy;  Laterality: N/A;    EYE SURGERY      INSERTION OF TUNNELED CENTRAL VENOUS HEMODIALYSIS CATHETER N/A 2/9/2024    Procedure: INSERTION, CATHETER, HEMODIALYSIS, DUAL LUMEN;  Surgeon: Wang Mendieta MD;  Location: [facility] OR;  Service: General;  Laterality: N/A;    INSTANTANEOUS WAVE-FREE RATIO (IFR) N/A 4/25/2023    Procedure: Instantaneous Wave-Free Ratio (IFR);  Surgeon: Keo Jenkins MD;  Location: [facility] CATH LAB/EP;  Service: Cardiology;  Laterality: N/A;    INTRAVASCULAR ULTRASOUND, NON-CORONARY  8/12/2022    Procedure: Intravascular Ultrasound, Non-Coronary;  Surgeon: Keo Jenkins MD;  Location: [facility] CATH LAB/EP;  Service: Cardiology;;    IVUS, CORONARY  4/25/2023    Procedure: IVUS, Coronary;  Surgeon: Keo Jenkins MD;  Location: [facility] CATH LAB/EP;  Service: Cardiology;;    IVUS, CORONARY  5/16/2023    Procedure: IVUS, Coronary;  Surgeon: Keo Jenkins MD;  Location: [facility] CATH LAB/EP;  Service: Cardiology;;  CX    IVUS, CORONARY  1/24/2025    Procedure: IVUS, Coronary;  Surgeon: Valente Moran MD;  Location: [facility] CATH LAB/EP;  Service: Cardiology;;    LEFT HEART CATHETERIZATION N/A 3/29/2019    Procedure: Left heart cath;  Surgeon: Keo Jenkins MD;  Location: [facility] CATH LAB/EP;  Service: Cardiology;  Laterality: N/A;    LEFT HEART CATHETERIZATION Left 10/8/2019    Procedure: Left heart cath;  Surgeon: Keo Jenkins MD;  Location: [facility] CATH LAB/EP;  Service: Cardiology;  Laterality: Left;    LEFT HEART CATHETERIZATION Left 4/10/2023    Procedure: Left heart cath;  Surgeon: Keo Jenkins MD;  Location: [facility] CATH LAB/EP;  Service: Cardiology;  Laterality: Left;     LEFT HEART CATHETERIZATION Left 4/25/2023    Procedure: Left heart cath;  Surgeon: Keo Jenkins MD;  Location: [facility] CATH LAB/EP;  Service: Cardiology;  Laterality: Left;    LEFT HEART CATHETERIZATION Left 5/16/2023    Procedure: Left heart cath;  Surgeon: Keo Jenkins MD;  Location: [facility] CATH LAB/EP;  Service: Cardiology;  Laterality: Left;    LEFT HEART CATHETERIZATION Left 6/26/2024    Procedure: Left heart cath;  Surgeon: Enoch Tirado MD;  Location: [facility] CATH LAB/EP;  Service: Cardiology;  Laterality: Left;    LEFT HEART CATHETERIZATION Left 9/16/2024    Procedure: Left heart cath;  Surgeon: Enoch Tirado MD;  Location: [facility] CATH LAB/EP;  Service: Cardiology;  Laterality: Left;    LITHOTRIPSY, CORONARY TRANSLUMINAL, PERCUTANEOUS  9/16/2024    Procedure: LITHOTRIPSY, CORONARY TRANSLUMINAL, PERCUTANEOUS;  Surgeon: Enoch Tirado MD;  Location: [facility] CATH LAB/EP;  Service: Cardiology;;    PERCUTANEOUS CORONARY INTERVENTION, ARTERY N/A 6/26/2024    Procedure: Percutaneous coronary intervention;  Surgeon: Enoch Tirado MD;  Location: [facility] CATH LAB/EP;  Service: Cardiology;  Laterality: N/A;    PERCUTANEOUS TRANSLUMINAL ANGIOPLASTY (PTA) OF PERIPHERAL VESSEL N/A 7/12/2019    Procedure: PTA, PERIPHERAL VESSEL;  Surgeon: Keo Jenkins MD;  Location: [facility] CATH LAB/EP;  Service: Cardiology;  Laterality: N/A;    PERCUTANEOUS TRANSLUMINAL ANGIOPLASTY (PTA) OF PERIPHERAL VESSEL Left 5/20/2022    Procedure: PTA, PERIPHERAL VESSEL;  Surgeon: Keo Jenkins MD;  Location: [facility] CATH LAB/EP;  Service: Cardiology;  Laterality: Left;    PERCUTANEOUS TRANSLUMINAL ANGIOPLASTY (PTA) OF PERIPHERAL VESSEL Right 8/12/2022    Procedure: PTA, PERIPHERAL VESSEL;  Surgeon: Keo Jenkins MD;  Location: [facility] CATH LAB/EP;  Service: Cardiology;  Laterality: Right;    PERCUTANEOUS TRANSLUMINAL BALLOON ANGIOPLASTY OF CORONARY ARTERY  4/25/2023    Procedure: Angioplasty-coronary;  Surgeon: Keo Jenkins  MD;  Location: [facility] CATH LAB/EP;  Service: Cardiology;;    PLACEMENT OF ARTERIOVENOUS GRAFT Left 4/15/2024    Procedure: INSERTION, GRAFT, ARTERIOVENOUS;  Surgeon: Scott Pascual MD;  Location: [facility] OR;  Service: General;  Laterality: Left;    PTCA, SINGLE VESSEL  5/16/2023    Procedure: PTCA, Single Vessel;  Surgeon: Keo Jenkins MD;  Location: [facility] CATH LAB/EP;  Service: Cardiology;;  CX    PTCA, SINGLE VESSEL  6/26/2024    Procedure: PTCA, Single Vessel;  Surgeon: Enoch Tirado MD;  Location: [facility] CATH LAB/EP;  Service: Cardiology;;    PTCA, SINGLE VESSEL Left 9/16/2024    Procedure: PTCA, Single Vessel;  Surgeon: Enoch Tirado MD;  Location: [facility] CATH LAB/EP;  Service: Cardiology;  Laterality: Left;    REMOVAL OF HEMODIALYSIS CATHETER Right 2/9/2024    Procedure: REMOVAL, CATHETER, HEMODIALYSIS;  Surgeon: Wang Mendieta MD;  Location: [facility] OR;  Service: General;  Laterality: Right;    REMOVAL OF TUNNELED CENTRAL VENOUS CATHETER (CVC) Right 5/20/2024    Procedure: REMOVAL, CATHETER, CENTRAL VENOUS, TUNNELED;  Surgeon: Scott Pascual MD;  Location: [facility];  Service: General;  Laterality: Right;    REPAIR, CHRONIC TOTAL OCCLUSION, CORONARY  6/26/2024    Procedure: Repair, Chronic Total Occlusion, Coronary;  Surgeon: Enoch Tirado MD;  Location: [facility] CATH LAB/EP;  Service: Cardiology;;    STENT, DRUG ELUTING, SINGLE VESSEL, CORONARY  4/25/2023    Procedure: Stent, Drug Eluting, Single Vessel, Coronary;  Surgeon: Keo Jenkins MD;  Location: [facility] CATH LAB/EP;  Service: Cardiology;;    STENT, DRUG ELUTING, SINGLE VESSEL, CORONARY  5/16/2023    Procedure: Stent, Drug Eluting, Single Vessel, Coronary;  Surgeon: Keo Jenkins MD;  Location: [facility] CATH LAB/EP;  Service: Cardiology;;  CX    TRANSESOPHAGEAL ECHOCARDIOGRAM WITH POSSIBLE CARDIOVERSION (JOSE W/ POSS CARDIOVERSION) N/A 6/19/2023    Procedure: Transesophageal echo (JOSE) intra-procedure log documentation;  Surgeon:  Keo Jenkins MD;  Location: [facility] CATH LAB/EP;  Service: Cardiology;  Laterality: N/A;       Review of patient's allergies indicates:   Allergen Reactions    Ace inhibitors Rash       Family History       Problem Relation (Age of Onset)    Aneurysm Mother    Cancer Father    Diabetes Sister    Heart disease Mother    Hypertension Mother, Sister    No Known Problems Brother, Son          Tobacco Use    Smoking status: Former     Current packs/day: 0.00     Types: Cigarettes     Quit date: 1999     Years since quittin.1     Passive exposure: Never    Smokeless tobacco: Never   Substance and Sexual Activity    Alcohol use: No     Alcohol/week: 0.0 standard drinks of alcohol    Drug use: No    Sexual activity: Yes     Partners: Female         Review of Systems   Reason unable to perform ROS: Unable to assess.     Objective:     Vital Signs (Most Recent):  Temp: (!) 85.5 °F (29.7 °C) (25)  Pulse: 79 (25)  Resp: 17 (25)  BP: (!) 149/91 (25)  SpO2: (!) 81 % (25) Vital Signs (24h Range):  Temp:  [85.5 °F (29.7 °C)-98.4 °F (36.9 °C)] 85.5 °F (29.7 °C)  Pulse:  [] 79  Resp:  [14-48] 17  SpO2:  [81 %-100 %] 81 %  BP: ()/() 149/91  Arterial Line BP: (129-176)/(47-59) 165/56     Weight: 80.8 kg (178 lb 2.1 oz)  Body mass index is 25.56 kg/m².      Intake/Output Summary (Last 24 hours) at 2025 0906  Last data filed at 2025 0700  Gross per 24 hour   Intake 602.51 ml   Output 200 ml   Net 402.51 ml        Physical Exam  Vitals reviewed.   Constitutional:       Appearance: He is ill-appearing.   HENT:      Head: Normocephalic and atraumatic.   Eyes:      Comments: Pupils dilated, not reactive to light   Cardiovascular:      Rate and Rhythm: Tachycardia present. Rhythm irregular.      Pulses: Normal pulses.      Heart sounds: No murmur heard.  Pulmonary:      Effort: Pulmonary effort is normal. No respiratory distress.      Breath sounds: No  wheezing or rhonchi.   Abdominal:      Palpations: Abdomen is soft.   Musculoskeletal:      Right lower leg: No edema.      Left lower leg: No edema.   Neurological:      Comments: Intubated on vent  Decerebrate Posturing          Vents:  Vent Mode: A/CMV-VC (06/09/25 0900)  Set Rate: 28 BPM (06/09/25 0900)  Vt Set: 480 mL (06/09/25 0900)  PEEP/CPAP: 5 cmH20 (06/09/25 0900)  Oxygen Concentration (%): 100 (06/09/25 0900)  Peak Airway Pressure: 37.9 cmH20 (06/09/25 0900)  Plateau Pressure: 23.1 cmH20 (06/09/25 0322)  Total Ve: 14.1 L/m (06/09/25 0900)  F/VT Ratio<105 (RSBI): 166.67 (06/09/25 0900)    Lines/Drains/Airways       Central Venous Catheter Line  Duration             Percutaneous Central Line - Triple Lumen  06/09/25 0609 Internal Jugular Right <1 day              Drain  Duration                  Urethral Catheter 06/09/25 0739 Straight-tip 16 Fr. <1 day              Airway  Duration                  Airway - Non-Surgical 06/09/25 0310 Endotracheal Tube <1 day              Arterial Line  Duration             Arterial Line 06/09/25 0530 Right Radial <1 day              Peripheral Intravenous Line  Duration                  Hemodialysis AV Fistula Left upper arm -- days         Peripheral IV - Single Lumen 06/07/25 1429 20 G Distal;Posterior;Right Forearm 1 day         Peripheral IV - Single Lumen 06/09/25 0732 20 G 1 in Anterior;Right Upper Arm <1 day                    Significant Labs:    CBC/Anemia Profile:  Recent Labs   Lab 06/08/25  0813 06/08/25  1510 06/09/25  0328   WBC 9.80 8.33 13.86*   HGB 10.5* 10.4* 9.8*   HCT 31.2* 30.0* 29.8*   * 120* 149*   MCV 82 82 85   RDW 15.8* 15.8* 15.6*        Chemistries:  Recent Labs   Lab 06/07/25  0948 06/08/25  0813 06/08/25  1510 06/09/25  0328 06/09/25  0650    131* 131* 131*  131* 130*   K 2.7* 2.8* 3.7 2.8*  2.8* 3.9   CL 89* 86* 85* 83*  83* 84*   CO2 28 24 27 20*  20* 24   BUN 21 44* 52* 62*  63* 66*   CREATININE 4.1* 6.7* 7.5* 8.3*  8.2*  8.5*   CALCIUM 8.3* 7.6* 7.4* 10.7*  10.7* 9.1   ALBUMIN 3.4* 2.8*  --  2.4*  2.3* 2.6*   PROT 8.6*  --   --  6.6 7.4   BILITOT 0.9  --   --  0.6 0.8   ALKPHOS 78  --   --  69 70   ALT <5*  --   --  20 27   AST 15  --   --  37 55*   MG  --  1.5*  --  6.4*  --    PHOS  --  6.9*  --  10.8*  --        All pertinent labs within the past 24 hours have been reviewed.    Significant Imaging:   I have reviewed all pertinent imaging results/findings within the past 24 hours.

## 2025-06-09 NOTE — PROGRESS NOTES
Nephrology Consult     Consult Requested By: Nima Ervin MD  Reason for Consult: ESRD     SUBJECTIVE:     ?    Review of Systems   Unable to perform ROS: Critical illness       Past Medical History:   Diagnosis Date    Acute congestive heart failure 09/13/2024    Allergy     Anemia     Anticoagulant long-term use     Arthritis     CKD (chronic kidney disease) stage 4, GFR 15-29 ml/min     Closed fracture of transverse process of lumbar vertebra 02/16/2024    COPD (chronic obstructive pulmonary disease) 08/20/2021    Coronary artery disease     Heart attack 10/04/2019    Hematuria     Hemothorax     Hyperlipidemia     Hypertension     Hyperuricemia     Hypocalcemia     Hypokalemia     Hypophosphatemia     Hypothyroidism 03/02/2023    PAD (peripheral artery disease)     Proteinuria     Vitamin D deficiency           OBJECTIVE:     Vital Signs (Most Recent)  Vitals:    06/09/25 1105 06/09/25 1115 06/09/25 1130 06/09/25 1134   BP:  (!) 89/50 (!) 92/49    Patient Position:       Pulse: (!) 56 (!) 54 (!) 53 (!) 50   Resp: (!) 28 (!) 28 (!) 28 (!) 28   Temp: (!) 91.4 °F (33 °C) (!) 91.2 °F (32.9 °C) (!) 90.9 °F (32.7 °C) (!) 90.7 °F (32.6 °C)   TempSrc:       SpO2: 95% 98% 99% 99%   Weight:       Height:             Date 06/09/25 0700 - 06/10/25 0659   Shift 0194-7501 5469-4062 7953-2026 24 Hour Total   INTAKE   I.V.(mL/kg) 275.9(3.4)   275.9(3.4)   IV Piggyback 100   100   Shift Total(mL/kg) 375.9(4.7)   375.9(4.7)   OUTPUT   Urine(mL/kg/hr) 125   125   Shift Total(mL/kg) 125(1.5)   125(1.5)   Weight (kg) 80.8 80.8 80.8 80.8             Medications:   aspirin  81 mg Per OG tube Daily    atorvastatin  40 mg Per OG tube QHS    [START ON 6/10/2025] clopidogreL  75 mg Per OG tube Daily    [START ON 6/10/2025] levothyroxine  75 mcg Per OG tube Before breakfast    pantoprazole  40 mg Intravenous Daily    perflutren protein-a microsphr  0.5 mL Intravenous Once    sevelamer carbonate  1.6 g Per OG tube TID              Physical Exam  Vitals and nursing note reviewed.   Constitutional:       Comments: Intubated sedated with propofol   HENT:      Head: Normocephalic and atraumatic.      Mouth/Throat:      Pharynx: No oropharyngeal exudate.   Eyes:      General: No scleral icterus.     Conjunctiva/sclera: Conjunctivae normal.      Pupils: Pupils are equal, round, and reactive to light.   Neck:      Vascular: No JVD.   Cardiovascular:      Rate and Rhythm: Normal rate and regular rhythm.      Heart sounds: Normal heart sounds. No murmur heard.     No friction rub.   Pulmonary:      Effort: Pulmonary effort is normal. No respiratory distress.      Breath sounds: Normal breath sounds. No wheezing or rales.   Abdominal:      General: Bowel sounds are normal. There is no distension.      Palpations: Abdomen is soft.      Tenderness: There is no abdominal tenderness.   Musculoskeletal:         General: Normal range of motion.      Cervical back: Normal range of motion and neck supple.      Right lower leg: No edema.      Left lower leg: No edema.      Comments: LUE AVG    Skin:     General: Skin is warm and dry.      Findings: No erythema or rash.   Neurological:      Comments: Unresponsive.  Gag and cough reflexes are absent, posturing, sluggish pupillary reflex     Psychiatric:         Mood and Affect: Affect normal.         Cognition and Memory: Memory normal.         Laboratory:  Recent Labs   Lab 06/08/25  0813 06/08/25  1510 06/09/25  0328 06/09/25  1026   WBC 9.80 8.33 13.86*  --    HGB 10.5* 10.4* 9.8*  --    HCT 31.2* 30.0* 29.8* 30.8*   * 120* 149*  --    MONO 8.4  0.82 7.6  0.63 4.5  0.62  --      Recent Labs   Lab 06/08/25  0813 06/08/25  1510 06/09/25  0328 06/09/25  0650   * 131* 131*  131* 130*   K 2.8* 3.7 2.8*  2.8* 3.9   CL 86* 85* 83*  83* 84*   CO2 24 27 20*  20* 24   BUN 44* 52* 62*  63* 66*   CREATININE 6.7* 7.5* 8.3*  8.2* 8.5*   CALCIUM 7.6* 7.4* 10.7*  10.7* 9.1   PHOS 6.9*  --   10.8* 7.8*       Diagnostic Results:  X-Ray: Reviewed  US: Reviewed  Echo: Reviewed    Left Ventricle: The left ventricle is mildly dilated. There is eccentric hypertrophy. Regional wall motion abnormalities present. See diagram for wall motion findings. There is mildly reduced systolic function with a visually estimated ejection fraction of 45 - 50%. There is indeterminate diastolic function.Elevated left ventricular filling pressure.    Right Ventricle: Normal right ventricular cavity size. Wall thickness is normal. Systolic function is normal.    Left Atrium: Left atrium is mildly dilated.    Right Atrium: Right atrium is mildly dilated.    Mitral Valve: There is mild to moderate regurgitation.    Pulmonic Valve: There is moderate regurgitation.    Pulmonary Artery: There is pulmonary hypertension. The estimated pulmonary artery systolic pressure is 47 mmHg.    IVC/SVC: Intermediate venous pressure at 8 mmHg.  ASSESSMENT/PLAN:   Patient found down site noted in his room around 3:00 a.m., resuscitative measure initiated.  Transferred to ICU.  Coded 2 more times this morning around 7:00 a.m..  Right now is on cooling protocol.  Patient's wife is bedside discussed the grave prognosis.  For now supportive measure only.  Patient is DNR.  I reviewed all of his electrolytes and volume status.  As of right now no indication for emergent dialysis.  But monitoring closely.  If the patient is more hemodynamically stable and there is any chance of surviving this episode, might need to be on CRRT    Total critical care time 35 minutes: included management of organ failures related to critical illness, titration of continuous infusions, review of pertinent labs and imaging studies, discussion with primary team      Thank you for the consult, will follow  With any question please call [phone]  Alexia Yarbrough MD    [facility] [place]    ESPERANZA Payne MD,   MD BRANDY Arredondo MD E. V. Harmon, NP    [id number]  [place], [id number]  [phone]

## 2025-06-09 NOTE — CONSULTS
[facility] - Intensive Care  Pulmonology  Consult Note    Patient Name: Domingo Gross  MRN: [id number]  Admission Date: 6/7/2025  Hospital Length of Stay: 2 days  Code Status: DNR  Attending Physician: Nima Ervin MD  Primary Care Provider: Geeta Coffey MD   Principal Problem: History of MI (myocardial infarction)    Consults  Subjective:     HPI:  64 year old man with history of ESRD on HD, PAD, CAD status post PCI, cardiac arrest, heart failure that presents to the ED on 06/07 for evaluation of chest pain. Patient was admitted for NSTEMI and started on heparin drip and DAPT. Course was complicated by code blue this morning, no pulse. Patient was coded accordingly to ACLS protocol, ROSC in 15 mins. Patient was intubated. Patient was transferred to ICU. Post ROSC, pupils are dilated and patient posturing. Patient was started on TTM. Patient developed recurrent cardiac arrest x2 in the ICU, had episode of ventricular tachycardia before arrest, coded accordingly to ACLS and received amiodarone. The ROSC achieved in less than 5 mins.    Past Medical History:   Diagnosis Date    Acute congestive heart failure 09/13/2024    Allergy     Anemia     Anticoagulant long-term use     Arthritis     CKD (chronic kidney disease) stage 4, GFR 15-29 ml/min     Closed fracture of transverse process of lumbar vertebra 02/16/2024    COPD (chronic obstructive pulmonary disease) 08/20/2021    Coronary artery disease     Heart attack 10/04/2019    Hematuria     Hemothorax     Hyperlipidemia     Hypertension     Hyperuricemia     Hypocalcemia     Hypokalemia     Hypophosphatemia     Hypothyroidism 03/02/2023    PAD (peripheral artery disease)     Proteinuria     Vitamin D deficiency        Past Surgical History:   Procedure Laterality Date    ABDOMINAL AORTOGRAPHY N/A 5/10/2019    Procedure: AORTOGRAM-ABDOMINAL;  Surgeon: Keo Jenkins MD;  Location: [facility] CATH LAB/EP;  Service: Cardiology;  Laterality: N/A;  RSFA intervention      ANGIOGRAM, CORONARY, WITH LEFT HEART CATHETERIZATION N/A 1/24/2025    Procedure: Angiogram, Coronary, with Left Heart Cath;  Surgeon: Valente Moran MD;  Location: [facility] CATH LAB/EP;  Service: Cardiology;  Laterality: N/A;    AORTOGRAPHY WITH SERIALOGRAPHY N/A 12/3/2021    Procedure: AORTOGRAM, WITH SERIALOGRAPHY;  Surgeon: Keo Jenkins MD;  Location: [facility] CATH LAB/EP;  Service: Cardiology;  Laterality: N/A;    AORTOGRAPHY WITH SERIALOGRAPHY N/A 4/1/2022    Procedure: AORTOGRAM, WITH SERIALOGRAPHY;  Surgeon: Keo Jenkins MD;  Location: [facility] CATH LAB/EP;  Service: Cardiology;  Laterality: N/A;    ATHERECTOMY, CORONARY N/A 4/25/2023    Procedure: Atherectomy-coronary;  Surgeon: Keo Jenkins MD;  Location: [facility] CATH LAB/EP;  Service: Cardiology;  Laterality: N/A;    ATHERECTOMY, CORONARY N/A 1/24/2025    Procedure: Atherectomy-coronary;  Surgeon: Valente Moran MD;  Location: [facility] CATH LAB/EP;  Service: Cardiology;  Laterality: N/A;    BONE MARROW BIOPSY Right 10/12/2021    Procedure: BIOPSY-BONE MARROW;  Surgeon: Naseem Woods MD;  Location: [facility] OR;  Service: Oncology;  Laterality: Right;    CARDIAC CATHETERIZATION      CATARACT EXTRACTION      CATARACT EXTRACTION W/  INTRAOCULAR LENS IMPLANT Right 6/8/2020    Procedure: EXTRACTION, CATARACT, WITH IOL INSERTION;  Surgeon: Tin Light MD;  Location: [facility] OR;  Service: Ophthalmology;  Laterality: Right;    CATARACT EXTRACTION W/  INTRAOCULAR LENS IMPLANT Left 7/2/2020    Procedure: EXTRACTION, CATARACT, WITH IOL INSERTION;  Surgeon: Tin Light MD;  Location: [facility] OR;  Service: Ophthalmology;  Laterality: Left;    COLONOSCOPY N/A 1/6/2022    Procedure: COLONOSCOPY;  Surgeon: Kathe Penaloza MD;  Location: [facility] ENDO (2ND FLR);  Service: Endoscopy;  Laterality: N/A;  COVID test at [facility] on 1/3-GT  okay to hold Plavix for 5 days and aspirin per Dr. Becerra  2nd floor due toextensive cardiac history   instructions mailed and my [facility]  portal - sm    CORONARY ANGIOGRAPHY N/A 3/29/2019    Procedure: ANGIOGRAM, CORONARY ARTERY;  Surgeon: Keo Jenkins MD;  Location: [facility] CATH LAB/EP;  Service: Cardiology;  Laterality: N/A;    CORONARY ANGIOGRAPHY Left 10/11/2019    Procedure: ANGIOGRAM, CORONARY ARTERY;  Surgeon: Keo Jenkins MD;  Location: [facility] CATH LAB/EP;  Service: Cardiology;  Laterality: Left;    CORONARY ANGIOGRAPHY N/A 4/10/2023    Procedure: ANGIOGRAM, CORONARY ARTERY;  Surgeon: Keo Jenkins MD;  Location: [facility] CATH LAB/EP;  Service: Cardiology;  Laterality: N/A;    CORONARY ANGIOGRAPHY N/A 6/26/2024    Procedure: ANGIOGRAM, CORONARY ARTERY;  Surgeon: Enoch Tirado MD;  Location: [facility] CATH LAB/EP;  Service: Cardiology;  Laterality: N/A;    CORONARY ANGIOGRAPHY N/A 9/16/2024    Procedure: ANGIOGRAM, CORONARY ARTERY;  Surgeon: Enoch Tirado MD;  Location: [facility] CATH LAB/EP;  Service: Cardiology;  Laterality: N/A;    CORONARY ANGIOPLASTY WITH STENT PLACEMENT  03/29/2019    mid and distal RCA    CORONARY ANGIOPLASTY WITH STENT PLACEMENT N/A 1/24/2025    Procedure: ANGIOPLASTY, CORONARY ARTERY, WITH STENT INSERTION;  Surgeon: Valente Moran MD;  Location: [facility] CATH LAB/EP;  Service: Cardiology;  Laterality: N/A;    ESOPHAGOGASTRODUODENOSCOPY N/A 1/6/2022    Procedure: EGD (ESOPHAGOGASTRODUODENOSCOPY);  Surgeon: Kathe Penaloza MD;  Location: [address]);  Service: Endoscopy;  Laterality: N/A;    EYE SURGERY      INSERTION OF TUNNELED CENTRAL VENOUS HEMODIALYSIS CATHETER N/A 2/9/2024    Procedure: INSERTION, CATHETER, HEMODIALYSIS, DUAL LUMEN;  Surgeon: Wang Mendieta MD;  Location: [facility] OR;  Service: General;  Laterality: N/A;    INSTANTANEOUS WAVE-FREE RATIO (IFR) N/A 4/25/2023    Procedure: Instantaneous Wave-Free Ratio (IFR);  Surgeon: Keo Jenkins MD;  Location: [facility] CATH LAB/EP;  Service: Cardiology;  Laterality: N/A;    INTRAVASCULAR ULTRASOUND, NON-CORONARY  8/12/2022    Procedure: Intravascular  Ultrasound, Non-Coronary;  Surgeon: Keo Jenkins MD;  Location: [facility] CATH LAB/EP;  Service: Cardiology;;    IVUS, CORONARY  4/25/2023    Procedure: IVUS, Coronary;  Surgeon: Keo Jenkins MD;  Location: [facility] CATH LAB/EP;  Service: Cardiology;;    IVUS, CORONARY  5/16/2023    Procedure: IVUS, Coronary;  Surgeon: Keo Jenkins MD;  Location: [facility] CATH LAB/EP;  Service: Cardiology;;  CX    IVUS, CORONARY  1/24/2025    Procedure: IVUS, Coronary;  Surgeon: Valente Moran MD;  Location: [facility] CATH LAB/EP;  Service: Cardiology;;    LEFT HEART CATHETERIZATION N/A 3/29/2019    Procedure: Left heart cath;  Surgeon: Keo Jenkins MD;  Location: [facility] CATH LAB/EP;  Service: Cardiology;  Laterality: N/A;    LEFT HEART CATHETERIZATION Left 10/8/2019    Procedure: Left heart cath;  Surgeon: Keo Jenkins MD;  Location: [facility] CATH LAB/EP;  Service: Cardiology;  Laterality: Left;    LEFT HEART CATHETERIZATION Left 4/10/2023    Procedure: Left heart cath;  Surgeon: Keo Jenkins MD;  Location: [facility] CATH LAB/EP;  Service: Cardiology;  Laterality: Left;    LEFT HEART CATHETERIZATION Left 4/25/2023    Procedure: Left heart cath;  Surgeon: Keo Jenkins MD;  Location: [facility] CATH LAB/EP;  Service: Cardiology;  Laterality: Left;    LEFT HEART CATHETERIZATION Left 5/16/2023    Procedure: Left heart cath;  Surgeon: Keo Jenkins MD;  Location: [facility] CATH LAB/EP;  Service: Cardiology;  Laterality: Left;    LEFT HEART CATHETERIZATION Left 6/26/2024    Procedure: Left heart cath;  Surgeon: Enoch Tirado MD;  Location: [facility] CATH LAB/EP;  Service: Cardiology;  Laterality: Left;    LEFT HEART CATHETERIZATION Left 9/16/2024    Procedure: Left heart cath;  Surgeon: Enoch Tirado MD;  Location: [facility] CATH LAB/EP;  Service: Cardiology;  Laterality: Left;    LITHOTRIPSY, CORONARY TRANSLUMINAL, PERCUTANEOUS  9/16/2024    Procedure: LITHOTRIPSY, CORONARY TRANSLUMINAL, PERCUTANEOUS;  Surgeon: Enoch Tirado MD;   Location: [facility] CATH LAB/EP;  Service: Cardiology;;    PERCUTANEOUS CORONARY INTERVENTION, ARTERY N/A 6/26/2024    Procedure: Percutaneous coronary intervention;  Surgeon: Enoch Tirado MD;  Location: [facility] CATH LAB/EP;  Service: Cardiology;  Laterality: N/A;    PERCUTANEOUS TRANSLUMINAL ANGIOPLASTY (PTA) OF PERIPHERAL VESSEL N/A 7/12/2019    Procedure: PTA, PERIPHERAL VESSEL;  Surgeon: Keo Jenkins MD;  Location: [facility] CATH LAB/EP;  Service: Cardiology;  Laterality: N/A;    PERCUTANEOUS TRANSLUMINAL ANGIOPLASTY (PTA) OF PERIPHERAL VESSEL Left 5/20/2022    Procedure: PTA, PERIPHERAL VESSEL;  Surgeon: Keo Jenkins MD;  Location: [facility] CATH LAB/EP;  Service: Cardiology;  Laterality: Left;    PERCUTANEOUS TRANSLUMINAL ANGIOPLASTY (PTA) OF PERIPHERAL VESSEL Right 8/12/2022    Procedure: PTA, PERIPHERAL VESSEL;  Surgeon: Keo Jenkins MD;  Location: [facility] CATH LAB/EP;  Service: Cardiology;  Laterality: Right;    PERCUTANEOUS TRANSLUMINAL BALLOON ANGIOPLASTY OF CORONARY ARTERY  4/25/2023    Procedure: Angioplasty-coronary;  Surgeon: Keo Jenkins MD;  Location: [facility] CATH LAB/EP;  Service: Cardiology;;    PLACEMENT OF ARTERIOVENOUS GRAFT Left 4/15/2024    Procedure: INSERTION, GRAFT, ARTERIOVENOUS;  Surgeon: Scott Pascual MD;  Location: [facility] OR;  Service: General;  Laterality: Left;    PTCA, SINGLE VESSEL  5/16/2023    Procedure: PTCA, Single Vessel;  Surgeon: Keo Jenkins MD;  Location: [facility] CATH LAB/EP;  Service: Cardiology;;  CX    PTCA, SINGLE VESSEL  6/26/2024    Procedure: PTCA, Single Vessel;  Surgeon: Enoch Tirado MD;  Location: [facility] CATH LAB/EP;  Service: Cardiology;;    PTCA, SINGLE VESSEL Left 9/16/2024    Procedure: PTCA, Single Vessel;  Surgeon: Enoch Tirado MD;  Location: [facility] CATH LAB/EP;  Service: Cardiology;  Laterality: Left;    REMOVAL OF HEMODIALYSIS CATHETER Right 2/9/2024    Procedure: REMOVAL, CATHETER, HEMODIALYSIS;  Surgeon: Wang Mendieta MD;  Location:  [facility] OR;  Service: General;  Laterality: Right;    REMOVAL OF TUNNELED CENTRAL VENOUS CATHETER (CVC) Right 2024    Procedure: REMOVAL, CATHETER, CENTRAL VENOUS, TUNNELED;  Surgeon: Scott Pascual MD;  Location: [facility] OR;  Service: General;  Laterality: Right;    REPAIR, CHRONIC TOTAL OCCLUSION, CORONARY  2024    Procedure: Repair, Chronic Total Occlusion, Coronary;  Surgeon: Enoch Tirado MD;  Location: [facility] CATH LAB/EP;  Service: Cardiology;;    STENT, DRUG ELUTING, SINGLE VESSEL, CORONARY  2023    Procedure: Stent, Drug Eluting, Single Vessel, Coronary;  Surgeon: Keo Jenkins MD;  Location: [facility] CATH LAB/EP;  Service: Cardiology;;    STENT, DRUG ELUTING, SINGLE VESSEL, CORONARY  2023    Procedure: Stent, Drug Eluting, Single Vessel, Coronary;  Surgeon: Keo Jenkins MD;  Location: [facility] CATH LAB/EP;  Service: Cardiology;;  CX    TRANSESOPHAGEAL ECHOCARDIOGRAM WITH POSSIBLE CARDIOVERSION (JOSE W/ POSS CARDIOVERSION) N/A 2023    Procedure: Transesophageal echo (JOSE) intra-procedure log documentation;  Surgeon: Keo Jenkins MD;  Location: [facility] CATH LAB/EP;  Service: Cardiology;  Laterality: N/A;       Review of patient's allergies indicates:   Allergen Reactions    Ace inhibitors Rash       Family History       Problem Relation (Age of Onset)    Aneurysm Mother    Cancer Father    Diabetes Sister    Heart disease Mother    Hypertension Mother, Sister    No Known Problems Brother, Son          Tobacco Use    Smoking status: Former     Current packs/day: 0.00     Types: Cigarettes     Quit date: 1999     Years since quittin.1     Passive exposure: Never    Smokeless tobacco: Never   Substance and Sexual Activity    Alcohol use: No     Alcohol/week: 0.0 standard drinks of alcohol    Drug use: No    Sexual activity: Yes     Partners: Female         Review of Systems   Reason unable to perform ROS: Unable to assess.     Objective:     Vital Signs (Most Recent):  Temp:  (!) 85.5 °F (29.7 °C) (06/09/25 0900)  Pulse: 79 (06/09/25 0900)  Resp: 17 (06/09/25 0900)  BP: (!) 149/91 (06/09/25 0900)  SpO2: (!) 81 % (06/09/25 0900) Vital Signs (24h Range):  Temp:  [85.5 °F (29.7 °C)-98.4 °F (36.9 °C)] 85.5 °F (29.7 °C)  Pulse:  [] 79  Resp:  [14-48] 17  SpO2:  [81 %-100 %] 81 %  BP: ()/() 149/91  Arterial Line BP: (129-176)/(47-59) 165/56     Weight: 80.8 kg (178 lb 2.1 oz)  Body mass index is 25.56 kg/m².      Intake/Output Summary (Last 24 hours) at 6/9/2025 0906  Last data filed at 6/9/2025 0700  Gross per 24 hour   Intake 602.51 ml   Output 200 ml   Net 402.51 ml        Physical Exam  Vitals reviewed.   Constitutional:       Appearance: He is ill-appearing.   HENT:      Head: Normocephalic and atraumatic.   Eyes:      Comments: Pupils dilated, but reactive to light. Weak corneal, but no cough reflex.   Cardiovascular:      Rate and Rhythm: Tachycardia present. Rhythm irregular.      Pulses: Normal pulses.      Heart sounds: No murmur heard.  Pulmonary:      Effort: Pulmonary effort is normal. No respiratory distress.      Breath sounds: No wheezing or rhonchi.   Abdominal:      Palpations: Abdomen is soft.   Musculoskeletal:      Right lower leg: No edema.      Left lower leg: No edema.   Neurological:      Comments: Intubated on vent  Decerebrate Posturing          Vents:  Vent Mode: A/CMV-VC (06/09/25 0900)  Set Rate: 28 BPM (06/09/25 0900)  Vt Set: 480 mL (06/09/25 0900)  PEEP/CPAP: 5 cmH20 (06/09/25 0900)  Oxygen Concentration (%): 100 (06/09/25 0900)  Peak Airway Pressure: 37.9 cmH20 (06/09/25 0900)  Plateau Pressure: 23.1 cmH20 (06/09/25 0322)  Total Ve: 14.1 L/m (06/09/25 0900)  F/VT Ratio<105 (RSBI): 166.67 (06/09/25 0900)    Lines/Drains/Airways       Central Venous Catheter Line  Duration             Percutaneous Central Line - Triple Lumen  06/09/25 0609 Internal Jugular Right <1 day              Drain  Duration                  Urethral Catheter 06/09/25  0739 Straight-tip 16 Fr. <1 day              Airway  Duration                  Airway - Non-Surgical 06/09/25 0310 Endotracheal Tube <1 day              Arterial Line  Duration             Arterial Line 06/09/25 0530 Right Radial <1 day              Peripheral Intravenous Line  Duration                  Hemodialysis AV Fistula Left upper arm -- days         Peripheral IV - Single Lumen 06/07/25 1429 20 G Distal;Posterior;Right Forearm 1 day         Peripheral IV - Single Lumen 06/09/25 0732 20 G 1 in Anterior;Right Upper Arm <1 day                    Significant Labs:    CBC/Anemia Profile:  Recent Labs   Lab 06/08/25  0813 06/08/25  1510 06/09/25  0328   WBC 9.80 8.33 13.86*   HGB 10.5* 10.4* 9.8*   HCT 31.2* 30.0* 29.8*   * 120* 149*   MCV 82 82 85   RDW 15.8* 15.8* 15.6*        Chemistries:  Recent Labs   Lab 06/07/25  0948 06/08/25  0813 06/08/25 1510 06/09/25 0328 06/09/25  0650    131* 131* 131*  131* 130*   K 2.7* 2.8* 3.7 2.8*  2.8* 3.9   CL 89* 86* 85* 83*  83* 84*   CO2 28 24 27 20*  20* 24   BUN 21 44* 52* 62*  63* 66*   CREATININE 4.1* 6.7* 7.5* 8.3*  8.2* 8.5*   CALCIUM 8.3* 7.6* 7.4* 10.7*  10.7* 9.1   ALBUMIN 3.4* 2.8*  --  2.4*  2.3* 2.6*   PROT 8.6*  --   --  6.6 7.4   BILITOT 0.9  --   --  0.6 0.8   ALKPHOS 78  --   --  69 70   ALT <5*  --   --  20 27   AST 15  --   --  37 55*   MG  --  1.5*  --  6.4*  --    PHOS  --  6.9*  --  10.8*  --        All pertinent labs within the past 24 hours have been reviewed.    Significant Imaging:   I have reviewed all pertinent imaging results/findings within the past 24 hours.  Assessment/Plan:   Acute Encephalopathy  CT head showed enlargement of the superior ophthalmic vein bilaterally but there is no additional evidence for acute intracranial process   Likely due to anoxic brain injury  Continue TTM  Delirium precautions  Daily SAT    Cardiac arrest  Multiple cardiac arrest in patient with extensive CAD history  CAD  Shock, likely  cardiogenic shock  Ventricular Tachycardia  TTE showed   Continue Levophed to keep MAP > 65  Continue amiodarone  Cardiology following    Acute Hypoxemia Respiratory Failure  CXR showed bilateral interstitial infiltrates  Pulmonary edema  Continue lung protective ventilatory support, keep Pplat < 30 and Ppeak airway < 40  Serial ABG  Wean FiO2 as tolerated  Daily SAT/SBT    Hypokalemia  Replete potassium  Continue to monitor    ESRD  Nephrology following  Will likely need ultrafiltration soon    Normocytic anemia  Likely due to anemia of chronic disease  Continue to monitor  Transfuse for hemoglobin < 7 or active bleeding with worsening hemodynamic instability       .Feeding/fluids: N/A  Analgesia: N/A  Sedation: Propofol  Thromboprophylaxis: Heparin  Head up position: Yes  Ulcer prophylaxis: PPI  Glycemic control: Yes  Spontaneous breathing trial: N/A  Bowel care: Yes  Indwelling catheter removal: Peripheral line, right central IJV, ETT, OGT, HD AV fistula left upper arm  Deescalation of antibiotics: N/A        Code:DNR      Wife updated at bedside.    Disposition: Pending clinical course.      Patient was seen with the attending physician MD Lizz Conner MD  Pulmonology  [facility] - Intensive Care    Pt seen and examined with Pulmonary/Critical Care team and this note was reviewed and validated with the following additional comments: Severe pre-existing vascular disease, CAD, ESRD on HD. Presents with chest pain. Prior to planned PCI, pt had cardiac arrest with sounds like it was PEA but may have been pulseless VT.  Arrested 2 more times. LVEF estimated to be about 25% post 3rd arrest. Has achieved ROSC and is not vasopressor requiring. 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 Wife informed of prognosis and DNR order written after discussion with her. We will let the dust settle tonight before we decide on next move.    Critical Care time was spent validating the  history and physical exam, reviewing the lab and imaging results, and discussing the care of the patient with the bedside nurse and the patient and/or surrogates. This critical care time did not overlap with that of any other provider or involve time for any procedures.  This patient has a high probability of sudden clinically significant deterioration which requires the highest level of physician preparedness to intervene urgently. I managed/supervised life or organ supporting interventions that required frequent physician assessments. I devoted my full attention in the ICU to the direct care of this patient for this period of time. Organ systems which are failing and require intensive, critical care support are: cardiac, respiratory, renal, neurologic  Critical Care time: 85 minutes    Mickey Doe MD  Phone [phone]

## 2025-06-09 NOTE — ASSESSMENT & PLAN NOTE
Anemia is likely due to chronic disease due to ESRD. Most recent hemoglobin and hematocrit are listed below.  Recent Labs     06/08/25  0813 06/08/25  1510 06/09/25  0328 06/09/25  1026   HGB 10.5* 10.4* 9.8*  --    HCT 31.2* 30.0* 29.8* 30.8*     Plan  - Monitor serial CBC: Daily  - Transfuse PRBC if patient becomes hemodynamically unstable, symptomatic or H/H drops below 7/21.  - Patient has not received any PRBC transfusions to date  - Patient's anemia is currently stable

## 2025-06-09 NOTE — PROGRESS NOTES
[facility] Medicine  Progress Note    Patient Name: Domingo Gross  MRN: [id number]  Patient Class: IP- Inpatient   Admission Date: 6/7/2025  Length of Stay: 2 days  Attending Physician: Nima Ervin MD  Primary Care Provider: Geeta Coffey MD        Subjective     Principal Problem:Cardiac arrest        HPI:  64 y.o. male with PMH ESRD on HD, PAD with multiple interventions, CAD (mid LAD/prox RCA PCI 3/2019 and prox LCA in 10/2019 following out of hospital cardiac arrest), after cardiac arrest in setting of prox LCX occlusion treated with PCI, 9/16/2024 s/p intervention w cutting/shockwave PTCA to Lcx, 01/24/25 PCI of OM, new reduction in EF presents to ER with substernal chest pain that radiates to his throat. Report his chest pain feels like heaviness as if something is heavy sitting on his chest, started last night at rest, he took sublingual nitro which only slightly helped but he was still feeling it, today as he was having HD the pain got worse so he came to the ED.    Patient denies smoking or alcohol use        Overview/Hospital Course:  No notes on file    Interval History:     I have seen and examined the patient   today    Patient had cardiac arrest 3 times this morning, currently intubated, in the ICU    Patient's wife is at bedside, code status was changed to DNR after she discussed with the ICU team, she understands the poor prognosis, she said she has a grandson who came and visited him.    Review of Systems   Unable to perform ROS: Intubated     Objective:     Vital Signs (Most Recent):  Temp: (!) 87.4 °F (30.8 °C) (06/09/25 1045)  Pulse: (!) 58 (06/09/25 1045)  Resp: (!) 25 (06/09/25 1045)  BP: (!) 209/132 (06/09/25 1030)  SpO2: (!) 89 % (06/09/25 1045) Vital Signs (24h Range):  Temp:  [85.5 °F (29.7 °C)-98.4 °F (36.9 °C)] 87.4 °F (30.8 °C)  Pulse:  [] 58  Resp:  [14-48] 25  SpO2:  [62 %-100 %] 89 %  BP: ()/() 209/132  Arterial Line BP:  (129-176)/(47-59) 165/56     Weight: 80.8 kg (178 lb 2.1 oz)  Body mass index is 25.56 kg/m².    Intake/Output Summary (Last 24 hours) at 6/9/2025 1100  Last data filed at 6/9/2025 0800  Gross per 24 hour   Intake 602.51 ml   Output 325 ml   Net 277.51 ml         Physical Exam  Constitutional:       Appearance: He is ill-appearing.   HENT:      Head: Normocephalic.   Cardiovascular:      Rate and Rhythm: Normal rate.   Pulmonary:      Comments: Intubated mechanically ventilated  Abdominal:      Palpations: Abdomen is soft.   Neurological:      Comments: Sedated               Significant Labs: All pertinent labs within the past 24 hours have been reviewed.    Significant Imaging: I have reviewed all pertinent imaging results/findings within the past 24 hours.      Assessment & Plan  Primary hypertension  Patient's blood pressure range in the last 24 hours was: BP  Min: 63/38  Max: 280/192.The patient's inpatient anti-hypertensive regimen is listed below:  Current Antihypertensives       Plan  - BP is controlled, no changes needed to their regimen    Hyperlipidemia    Continue statin  Anemia due to chronic kidney disease, on chronic dialysis  Anemia is likely due to chronic disease due to ESRD. Most recent hemoglobin and hematocrit are listed below.  Recent Labs     06/08/25  0813 06/08/25  1510 06/09/25  0328 06/09/25  1026   HGB 10.5* 10.4* 9.8*  --    HCT 31.2* 30.0* 29.8* 30.8*     Plan  - Monitor serial CBC: Daily  - Transfuse PRBC if patient becomes hemodynamically unstable, symptomatic or H/H drops below 7/21.  - Patient has not received any PRBC transfusions to date  - Patient's anemia is currently stable    Hypothyroidism  Cont levothyroxine     Paroxysmal atrial fibrillation  Patient has long standing persistent (>12 months) atrial fibrillation. Patient is currently in sinus rhythm. [id number] Score: 1. The patients heart rate in the last 24 hours is as follows:  Pulse  Min: 53  Max: 127      Antiarrhythmics  amiodarone 360 mg/200 mL (1.8 mg/mL) infusion, Continuous, Intravenous  amiodarone 360 mg/200 mL (1.8 mg/mL) infusion, Continuous, Intravenous    Anticoagulants  heparin 25,000 units in dextrose 5% 250 mL (100 units/mL) infusion LOW INTENSITY nomogram - [facility], Continuous, Intravenous  heparin 25,000 units in dextrose 5% (100 units/ml) IV bolus from bag LOW INTENSITY nomogram - [facility], As needed (PRN), Intravenous  heparin 25,000 units in dextrose 5% (100 units/ml) IV bolus from bag LOW INTENSITY nomogram - [facility], As needed (PRN), Intravenous    Plan  - Replete lytes with a goal of K>4, Mg >2  - Patient is anticoagulated, see medications listed above.  - Patient's afib is currently controlled  -      History of MI (myocardial infarction)  Patient with chest pain worsened while on HD  Trops are flat 0.14  EKG with no ischemic changes  Echo with worsening wall motion as per cardio  Started on heparin drip  Seen by cardiology, plan was for Left heart cath today but patient had a cardiac arrest this morning    HFrEF (heart failure with reduced ejection fraction)    Repeat echo showed   Left Ventricle: The left ventricle is moderately dilated. Mildly increased wall thickness. There is eccentric hypertrophy. Regional wall motion abnormalities present. See diagram for wall motion findings. There is moderately reduced systolic function with a visually estimated ejection fraction of 35 - 40%. Quantitated ejection fraction is 38%. Unable to assess diastolic function due to poor image quality.    Right Ventricle: The right ventricle is normal in size Systolic function is normal. TAPSE is 2.3 cm.    Overall the study quality was technically difficult.  Cardiac arrest  Patient resting 3 times this morning  Currently intubated and mechanically ventilated  Patient wife is at bedside, he is DNR now  Hypothermia protocol initiated    ESRD (end stage renal disease) on dialysis   >>ASSESSMENT AND PLAN FOR ESRD (END STAGE  RENAL DISEASE) WRITTEN ON 6/7/2025  5:21 PM BY NIMA ERVIN MD    Creatine stable for now. BMP reviewed- noted Estimated Creatinine Clearance: 9.2 mL/min (A) (based on SCr of 8.5 mg/dL (H)). according to latest data. Based on current GFR, CKD stage is end stage.  Monitor UOP and serial BMP and adjust therapy as needed. Renally dose meds. Avoid nephrotoxic medications and procedures.    ESRD On HD,  VTE Risk Mitigation (From admission, onward)           Ordered     heparin 25,000 units in dextrose 5% (100 units/ml) IV bolus from bag LOW INTENSITY nomogram - [facility]  As needed (PRN)        Question:  Heparin Infusion Adjustment (DO NOT MODIFY ANSWER)  Answer:  [email or web address]\Images\Pharmacy\HeparinInfusions\heparin LOW INTENSITY nomogram for [facility] [id number]    06/07/25 1147     heparin 25,000 units in dextrose 5% (100 units/ml) IV bolus from bag LOW INTENSITY nomogram - [facility]  As needed (PRN)        Question:  Heparin Infusion Adjustment (DO NOT MODIFY ANSWER)  Answer:  \[email or web address]\Images\Pharmacy\HeparinInfusions\heparin LOW INTENSITY nomogram for [facility] [id number].pdf    06/07/25 1147     heparin 25,000 units in dextrose 5% 250 mL (100 units/mL) infusion LOW INTENSITY nomogram - [facility]  Continuous        Question:  Begin at (units/kg/hr)  Answer:  12    06/07/25 1147     IP VTE HIGH RISK PATIENT  Once         06/07/25 1155     Place sequential compression device  Until discontinued         06/07/25 1155                    Discharge Planning   FLACO:      Code Status: DNR   Medical Readiness for Discharge Date:                Critical care time spent on the evaluation and treatment of severe organ dysfunction, review of pertinent labs and imaging studies, discussions with consulting providers and discussions with patient/family: 40 minutes.            Nima Ervin MD  Department of Hospital Medicine   [facility] - Intensive Care

## 2025-06-09 NOTE — EICU
"EICU BRIEF ADMIT NOTE:    HISTORY:  s/p cardiac arrest.  Please refer to H/P and ER notes for detail    CAMERA ASSESSMENT: Two way audiovisual assessment was done: Yes    Telemetry was reviewed. Medical records including notes, labs and imaging were reviewed.Yes    DISCUSSED with bedside nurse.Yes    ASSESSMENT AND PLAN:    # Cardiac arrest , ROSC in "15 " min. Irregular rhythm, Hypotension. Labs awaited. Cardiology already on the case for NonSTEMI, continue cardiac meds as previous;y  # ?Anoxic encephalopathy: CT head stat. TTM.  # ESRD and electrolyte replacement: Nephrology following patient  # NonSTEMI: Continue cardiac meds. Heparin        BEST PRACTICES REVIEW:    INTUBATED: Yes;Tidal Volume review completed.  ml, 6 ml per KG IBW. Vent bundle initiated y  GLYCEMIN CONTROL:  Diabetes: no  STRESS ULCER PROPHYLAXIS: H2 antagonist   DVT PROPHYLAXIS:  Pharmacological, Already on therapeutic anticoagulation    Thank You for allowing EICU to participate in the care of the patient. Please call as needed      Alejandro Woo MD  EICU  Critical Care Medicine        "

## 2025-06-09 NOTE — EICU
CODE BLUE VIRTUAL ICU NURSE NOTE       Admit Date: 2025  LOS: 2  Code Status: DNR   : 1960  Age: 64 y.o.  Weight:   Wt Readings from Last 1 Encounters:   25 80.8 kg (178 lb 2.1 oz)     Sex: male  Race: White   Bed: K566/K566 A:   MRN: [id number]  Code Start Time 07:04  Was the patient discharged from a PACU within last 24 hours? No   Did the patient receive conscious sedation/general anesthesia in last 24 hours? No   Was the patient in the ED within the past 24 hours? No   Was the patient on NIPPV within the past 24 hours? No   Attending Physician: Nima Ervin MD  Primary Service: [facility] MEDICINE PHYSICIAN TEAM 2     SITUATION    Why is the patient in the hospital?: History of MI (myocardial infarction)    Patient has a past medical history of Acute congestive heart failure, Allergy, Anemia, Anticoagulant long-term use, Arthritis, CKD (chronic kidney disease) stage 4, GFR 15-29 ml/min, Closed fracture of transverse process of lumbar vertebra, COPD (chronic obstructive pulmonary disease), Coronary artery disease, Heart attack, Hematuria, Hemothorax, Hyperlipidemia, Hypertension, Hyperuricemia, Hypocalcemia, Hypokalemia, Hypophosphatemia, Hypothyroidism, PAD (peripheral artery disease), Proteinuria, and Vitamin D deficiency.    Last Vitals:  Temp: 92.3 °F (33.5 °C) (800)  Pulse: 105 (800)  Resp: 38 (800)  BP: 162/102 (800)  SpO2: 100 % (800)    24 Hours Vitals Range:  Temp:  [92.3 °F (33.5 °C)-98.4 °F (36.9 °C)]   Pulse:  []   Resp:  [14-48]   BP: ()/()   SpO2:  [91 %-100 %]     Labs:  Recent Labs     25  0813 25  1510 25  0328   WBC 9.80 8.33 13.86*   HGB 10.5* 10.4* 9.8*   HCT 31.2* 30.0* 29.8*   * 120* 149*       Recent Labs     25  0813 25  1510 25  0328 25  0650   * 131* 131*  131* 130*   K 2.8* 3.7 2.8*  2.8* 3.9   CL 86* 85* 83*  83* 84*   CO2 24 27 20*  20* 24    BUN 44* 52* 62*  63* 66*   CREATININE 6.7* 7.5* 8.3*  8.2* 8.5*   GLU 98 95 186*  186* 160*   PHOS 6.9*  --  10.8*  --    MG 1.5*  --  6.4*  --         Recent Labs     06/09/25  0534   PH 7.447   PCO2 45.6*   PO2 200*   HCO3 31.5*   POCSATURATED 99.5        ASSESSMENT/INTERVENTIONS    Time of CPR initiation: 07:04  Time of first shock: n/a  Time of first Epinephrine dose: 07:04:10  Airway confirmation Capnometry (numeric ETCO2)    Please see Code Narrator Documentation for more details.    OUTCOME    ROSC obtained at 0706    Disposition: Remain in room      CODE TEAM MEMBERS    Code Leader: Yolanda Stevensmi    Resident/LACY: ANA    BEDSIDE RN: Katelyn Sarmiento RN    CHARGE RN: Leann Sharif RN    RRT:  Arpit Shultz RRT

## 2025-06-09 NOTE — EICU
Called to the room for Time out, Procedural surveillance, and Documentation assistance    [x] Verified patient name   [x] Verified patient   [x] Read from armband    Central line    Procedure checklist: Time out flowsheet complete, CXR ordered, LDA added, and Bedside procedure charge ordered    Right IJ CVC, performed by Dr. BRIGID Mcleod

## 2025-06-09 NOTE — NURSING
Received call from telemetry tech that patient's heart rate had dropped down into the 30s. Upon entering room, patient noted to be unresponsive, cyanotic. No pulse palpated. CPR initiated. Code Blue activated.

## 2025-06-09 NOTE — CODE DOCUMENTATION
DWAYNE VELARDE RAPID RESPONSE NURSE NOTE       Admit Date: 2025  LOS: 2  Code Status: Full Code   Date of Consult: 2025  : 1960  Age: 64 y.o.  Weight:   Wt Readings from Last 1 Encounters:   25 80.8 kg (178 lb 2.1 oz)     Sex: male  Race: White   [facility] A:   MRN: [id number]  Time Rapid Response Team page Received: 030  Time Rapid Response Team at Bedside: 301  Time Rapid Response Team left Bedside: 311  Was the patient discharged from an ICU this admission? No   Was the patient discharged from a PACU within last 24 hours? No   Did the patient receive conscious sedation/general anesthesia in last 24 hours? No   Was the patient in the ED within the past 24 hours? No   Was the patient on NIPPV within the past 24 hours? No   Did this progress into an ARC or CPA?: Cardio Pulmonary Arrest  Attending Physician: Nima Ervin MD  Primary Service: KN [facility] MEDICINE PHYSICIAN TEAM 2     SITUATION    Notified by code pager.  Reason for alert: Code Blue  Called to evaluate the patient for All of the above    Why is the patient in the hospital?: NSTEMI (non-ST elevated myocardial infarction)    Patient has a past medical history of Acute congestive heart failure, Allergy, Anemia, Anticoagulant long-term use, Arthritis, CKD (chronic kidney disease) stage 4, GFR 15-29 ml/min, Closed fracture of transverse process of lumbar vertebra, COPD (chronic obstructive pulmonary disease), Coronary artery disease, Heart attack, Hematuria, Hemothorax, Hyperlipidemia, Hypertension, Hyperuricemia, Hypocalcemia, Hypokalemia, Hypophosphatemia, Hypothyroidism, PAD (peripheral artery disease), Proteinuria, and Vitamin D deficiency.    Last Vitals:  Temp: 94.5 °F (34.7 °C) (527)  Pulse: 124 (527)  Resp: 37 (527)  BP: 96/53 ( 0354)  SpO2: 100 % (527)    24 Hours Vitals Range:  Temp:  [93.6 °F (34.2 °C)-98.4 °F (36.9 °C)]   Pulse:  []   Resp:  [18-37]   BP:  ()/(50-91)   SpO2:  [94 %-100 %]     Labs:  Recent Labs     06/08/25  0813 06/08/25  1510 06/09/25  0328   WBC 9.80 8.33 13.86*   HGB 10.5* 10.4* 9.8*   HCT 31.2* 30.0* 29.8*   * 120* 149*       Recent Labs     06/08/25  0813 06/08/25  1510 06/09/25  0328   * 131* 131*  131*   K 2.8* 3.7 2.8*  2.8*   CL 86* 85* 83*  83*   CO2 24 27 20*  20*   BUN 44* 52* 62*  63*   CREATININE 6.7* 7.5* 8.3*  8.2*   GLU 98 95 186*  186*   PHOS 6.9*  --  10.8*   MG 1.5*  --  6.4*        Recent Labs     06/09/25  0534   PH 7.447   PCO2 45.6*   PO2 200*   HCO3 31.5*   POCSATURATED 99.5          Please see Code Blue Documentation for more details.    OUTCOME    ROSC obtained at 0311    Disposition: Tx in ICU bed 566    CODE TEAM MEMBERS    : Dr. Leiva    RRN: Jesús Pinzon     BEDSIDE RN: Agatha UREÑA RN: Sarah     RRT: Margareth Camilo    Additional staff: Eleazar Calderon, [place], Bronson Pichardo, Alexia Mccann

## 2025-06-09 NOTE — CARE UPDATE
Code blue called.  Patient had no pulse.  ACLS protocol followed and patient regained pulse..  Patient transferred to ICU for further management.    I called patient's spouse(Karmen) and made her aware of patient's condition.

## 2025-06-09 NOTE — ASSESSMENT & PLAN NOTE
>>ASSESSMENT AND PLAN FOR ESRD (END STAGE RENAL DISEASE) WRITTEN ON 6/7/2025  5:21 PM BY ERICH FRAZIER MD    Creatine stable for now. BMP reviewed- noted Estimated Creatinine Clearance: 9.2 mL/min (A) (based on SCr of 8.5 mg/dL (H)). according to latest data. Based on current GFR, CKD stage is end stage.  Monitor UOP and serial BMP and adjust therapy as needed. Renally dose meds. Avoid nephrotoxic medications and procedures.    ESRD On HD,

## 2025-06-09 NOTE — PLAN OF CARE
Care Plan      Pt is unresponsive, posturing, unable to track and follow commands. Positive for corneals, but no cough/gag. Afib on monitor. A line zeroed and opitmized on monitor, waveforms monitored. On ventilator. OG tube placed, pending ok to use. Anuric, pt is HD pt T Th Sat. Stat CT of head done overnight. Levo gtt titrated for map goal >65, amio gtt infusing. Replaced K+ x1. See below and flowsheets for full assessment and VS info.      Neuro:  Colin Coma Scale  Best Eye Response: 1-->(E1) none  Best Motor Response: 1-->(M1) none  Best Verbal Response: 1-->(V1) none  Colin Coma Scale Score: 3  Assessment Qualifiers: Patient intubated, No eye obstruction present  Pupil PERRLA: yes  24 hr Temp:  [91 °F (32.8 °C)-98.4 °F (36.9 °C)]      CV:  Rhythm: atrial rhythm  DVT prophylaxis: VTE Core Measure: Pharmacological prophylaxis initiated/maintained    Resp:     Vent Mode: A/CMV-VC  Set Rate: 28 BPM  Oxygen Concentration (%): 100  Vt Set: 480 mL  PEEP/CPAP: 5 cmH20    GI/:  GI prophylaxis: yes  Diet/Nutrition Received: NPO  Last Bowel Movement: 06/06/25  Voiding Characteristics: anuria, patient on hemodialysis   Intake/Output Summary (Last 24 hours) at 6/9/2025 0846  Last data filed at 6/9/2025 0700  Gross per 24 hour   Intake 602.51 ml   Output 200 ml   Net 402.51 ml       Labs/Accuchecks:  Recent Labs   Lab 06/09/25  0328   WBC 13.86*   RBC 3.50*   HGB 9.8*   HCT 29.8*   *      Recent Labs   Lab 06/09/25  0650   *   K 3.9   CO2 24   CL 84*   BUN 66*   CREATININE 8.5*   ALKPHOS 70   ALT 27   AST 55*   BILITOT 0.8      Recent Labs   Lab 06/07/25  0948 06/07/25  1549 06/09/25  0650   PROTIME 15.1*  --   --    INR 1.3*  --   --    APTT 36.8*   < > 32.7*    < > = values in this interval not displayed.      Recent Labs   Lab 06/09/25  0650   *   TROPONINI 0.355*       Electrolytes: Electrolytes replaced  Accuchecks: none    Gtts/LDAs:   amiodarone in dextrose 5%  1 mg/min Intravenous  Continuous        amiodarone in dextrose 5%  0.5 mg/min Intravenous Continuous 16.7 mL/hr at 06/09/25 0700 0.5 mg/min at 06/09/25 0700    heparin (porcine) in D5W  0-40 Units/kg/hr Intravenous Continuous 8.2 mL/hr at 06/09/25 0004 10 Units/kg/hr at 06/09/25 0004    NORepinephrine bitartrate-D5W  0-3 mcg/kg/min Intravenous Continuous   Held at 06/09/25 0707    propofoL  0-50 mcg/kg/min Intravenous Continuous 4.8 mL/hr at 06/09/25 0732 10 mcg/kg/min at 06/09/25 0732       Lines/Drains/Airways       Central Venous Catheter Line  Duration             Percutaneous Central Line - Triple Lumen  06/09/25 0609 Internal Jugular Right <1 day              Drain  Duration                  Urethral Catheter 06/09/25 0739 Straight-tip 16 Fr. <1 day              Airway  Duration                  Airway - Non-Surgical 06/09/25 0310 Endotracheal Tube <1 day              Arterial Line  Duration             Arterial Line 06/09/25 0530 Right Radial <1 day              Peripheral Intravenous Line  Duration                  Hemodialysis AV Fistula Left upper arm -- days         Peripheral IV - Single Lumen 06/07/25 0945 20 G Right Antecubital 1 day         Peripheral IV - Single Lumen 06/07/25 1429 20 G Distal;Posterior;Right Forearm 1 day         Peripheral IV - Single Lumen 06/09/25 0732 20 G 1 in Anterior;Right Upper Arm <1 day                    Skin/Wounds  Bathing/Skin Care: dressed/undressed;electrode patches/site rotation;linen changed (06/09/25 0322)  Wounds: No  Wound care consulted: No    Consults  Consults (From admission, onward)          Status Ordering Provider     Inpatient consult to Anesthesiology  Once        Provider:  Yusuf Raygoza MD    Acknowledged YUSUF RAYGOZA     Inpatient consult to Nephrology-[facility]  Once        Provider:  (Not yet assigned)    Acknowledged ERICH FRAZIER     [facility]  Once        Provider:  (Not yet assigned)     Completed BLAINE SHEEHAN.

## 2025-06-09 NOTE — SUBJECTIVE & OBJECTIVE
Interval History:     I have seen and examined the patient   today    Patient had cardiac arrest 3 times this morning, currently intubated, in the ICU    Patient's wife is at bedside, code status was changed to DNR after she discussed with the ICU team, she understands the poor prognosis, she said she has a grandson who came and visited him.    Review of Systems   Unable to perform ROS: Intubated     Objective:     Vital Signs (Most Recent):  Temp: (!) 87.4 °F (30.8 °C) (06/09/25 1045)  Pulse: (!) 58 (06/09/25 1045)  Resp: (!) 25 (06/09/25 1045)  BP: (!) 209/132 (06/09/25 1030)  SpO2: (!) 89 % (06/09/25 1045) Vital Signs (24h Range):  Temp:  [85.5 °F (29.7 °C)-98.4 °F (36.9 °C)] 87.4 °F (30.8 °C)  Pulse:  [] 58  Resp:  [14-48] 25  SpO2:  [62 %-100 %] 89 %  BP: ()/() 209/132  Arterial Line BP: (129-176)/(47-59) 165/56     Weight: 80.8 kg (178 lb 2.1 oz)  Body mass index is 25.56 kg/m².    Intake/Output Summary (Last 24 hours) at 6/9/2025 1100  Last data filed at 6/9/2025 0800  Gross per 24 hour   Intake 602.51 ml   Output 325 ml   Net 277.51 ml         Physical Exam  Constitutional:       Appearance: He is ill-appearing.   HENT:      Head: Normocephalic.   Cardiovascular:      Rate and Rhythm: Normal rate.   Pulmonary:      Comments: Intubated mechanically ventilated  Abdominal:      Palpations: Abdomen is soft.   Neurological:      Comments: Sedated               Significant Labs: All pertinent labs within the past 24 hours have been reviewed.    Significant Imaging: I have reviewed all pertinent imaging results/findings within the past 24 hours.

## 2025-06-09 NOTE — CLINICAL REVIEW
IP Sepsis Screen (most recent)       Sepsis Screen (IP) - 06/09/25 0909       Is the patient's history or complaint suggestive of a possible infection? Yes  -TR    Are there at least two of the following signs and symptoms present? Yes   profound hypothermia; TTM initiated -TR    Sepsis signs/symptoms - Hyper or Hypothermia Hyperthermia >100.4 or Hypothermia < 96.8  -TR    Sepsis signs/symptoms - WBC WBC < 4,000 or WBC > 12,000  -TR    Sepsis signs/symptoms - Altered Mental Status Altered Mental Status  -TR    Are any of the following organ dysfunction criteria present and not considered to be due to a chronic condition? Yes  -TR    Organ Dysfunction Criteria Lactate > 2.0  -TR    Organ Dysfunction Criteria - Resp Comp Respiratory Compromise: Requiring > 5L NC  -TR    Initiate Sepsis Protocol No  -TR    Reason sepsis not considered Pt. receiving appropriate management   s/p multiple cardiac arrests - made DNR after last event; -TR              User Key  (r) = Recorded By, (t) = Taken By, (c) = Cosigned By      Initials Name    Chayito Tolentino RN

## 2025-06-09 NOTE — PROGRESS NOTES
Subjective     Patient ID: Domingo Gross is a 64 y.o. male.    Chief Complaint: Establish Care    HPI  History of Present Illness    CHIEF COMPLAINT:  Domingo presents today for follow up.    HTN/CAD S/P MI/A-FIB WITH ANTICOAGULATION:  He has a history of massive myocardial infarction in 2019 requiring resuscitation with AED. He experiences occasional chest pain and has nitroglycerin available for use. He reports having atrial fibrillation and can feel when episodes occur. He has a history of multiple stent placements and is scheduled for surgery on Monday for possible additional stent placement.    ESRD WITH DIALYSIS:  He is on hemodialysis 3 times per week (Tuesdays, Thursdays, and Saturdays). His kidney failure developed following the heart attack due to insufficient circulation to the kidneys.    COPD:  He has a history of emphysema and is a former smoker. He uses albuterol inhaler occasionally for breathing issues.    MUSCULOSKELETAL:  He has history of vertebral fracture from approximately one year ago, managed conservatively with immobilizer and physical therapy.    GASTROINTESTINAL:  Colonoscopy and endoscopy on January 6th, 2022 revealed multiple polyps which were removed during the procedure. Pathology showed no evidence of cancer.    OCULAR:  He underwent cataract surgery in 2019.      ROS:  General: -fever, -chills, -fatigue, -weight gain, -weight loss  Eyes: +vision changes, -redness, -discharge  ENT: -ear pain, -nasal congestion, -sore throat  Cardiovascular: +chest pain, -palpitations, +lower extremity edema, +lower extremity pain with movement  Respiratory: -cough, -shortness of breath  Gastrointestinal: -abdominal pain, -nausea, -vomiting, -diarrhea, -constipation, -blood in stool  Genitourinary: -dysuria, -hematuria, -frequency  Musculoskeletal: -joint pain, -muscle pain  Skin: -rash, -lesion  Neurological: -headache, -dizziness, -numbness, -tingling  Psychiatric: -anxiety, -depression, -sleep  difficulty       Most recent lab results reviewed with patient.        Objective     Vitals:    06/04/25 1413   BP: 128/78   Pulse: 71        Current Medications[1]     Physical Exam    Vitals: Blood pressure looks great.  General: No acute distress. Well-developed. Well-nourished.  Eyes: EOMI. Sclerae anicteric.  HENT: Normocephalic. Atraumatic. Nares patent. Moist oral mucosa.  Ears: Bilateral TMs clear. Bilateral EACs clear.  Cardiovascular: Regular rate. Regular rhythm. No murmurs. No rubs. No gallops. Normal S1, S2. Bilateral carotids appear kind of narrowed.  Respiratory: Normal respiratory effort. Clear to auscultation bilaterally. No rales. No rhonchi. No wheezing.  Abdomen: Soft. Non-tender. Non-distended. Normoactive bowel sounds.  Musculoskeletal: No  obvious deformity.  Extremities: No lower extremity edema.  Neurological: Alert & oriented x3. No slurred speech. Normal gait.  Psychiatric: Normal mood. Normal affect. Good insight. Good judgment.  Skin: Warm. Dry. No rash.            Assessment and Plan     Primary hypertension  -     CBC Auto Differential; Future; Expected date: 06/04/2025  -     Comprehensive Metabolic Panel; Future; Expected date: 06/04/2025  -     Hemoglobin A1C; Future; Expected date: 06/04/2025    Hypokalemia    Paroxysmal atrial fibrillation    Atherosclerosis of native coronary artery of native heart with unstable angina pectoris    Chronic anticoagulation    Hyperlipidemia, unspecified hyperlipidemia type    Chronic obstructive pulmonary disease, unspecified COPD type    Hypothyroidism, unspecified type  -     TSH; Future; Expected date: 06/04/2025  -     T4, Free; Future; Expected date: 09/04/2025    Hyperparathyroidism  -     PTH, Intact; Future; Expected date: 06/04/2025    Primary insomnia    Thrombocytopenia, unspecified    ESRD (end stage renal disease) on dialysis    Anemia due to chronic kidney disease, on chronic dialysis         Assessment & Plan    PLAN SUMMARY:  -  Scheduled for stent placement surgery  - Prescribed Coreg 12.5 mg twice daily and alcolore twice daily  - Discontinued furosemide and potassium supplementation as per nephrologist  - Ordered comprehensive labs including thyroid function tests  - Will follow up after lab results to determine if thyroid medication needs to be restarted    COPD:  - Domingo's COPD is currently stable without acute exacerbation.  - Albuterol inhaler usage is infrequent and not required daily.  - Will continue to monitor breathing status.    ATRIAL FIBRILLATION WITH ANTICOAG:  - Monitored the patient's atrial fibrillation episodes, which the patient can feel when they occur.  - Evaluated cardiac rhythm, which is currently regular.  - Prescribed Coreg 12.5 mg twice daily and alcolore twice daily for control.    HEART FAILURE:  - Evaluated the patient's systemic systolic heart failure with an EF of 45-50%.  - Monitored leg edema, which occurs when heart failure is acute.  - Reviewed cardiac history including heart failure.    PERIPHERAL VASCULAR DISEASE:  - Monitored the patient's leg pain, which occurs with prolonged standing or walking.  - Reviewed cardiac history including peripheral artery disease.    END STAGE RENAL DISEASE AND DIALYSIS:  - Monitored the patient's hemodialysis schedule, which occurs on Tuesday, Thursday, and Saturday.  - Evaluated dialysis status, noting previous illness related to dialysis.  - Discontinued furosemide as per nephrologist's instruction and subsequently discontinued potassium supplementation.  - Advised the patient to obtain laboratory studies to check potassium levels and other parameters.    COPD (EMPHYSEMA):  - Noted smoking history which led to diagnosis of emphysema type COPD.    CORONARY ARTERY DISEASE AND STENT S/P MI:  - Reviewed cardiac history including coronary artery disease.  - Noted multiple previous interventions and stent placements.  - Scheduled the patient for surgery to have a stent  placed.    HISTORY OF VERTEBRAL FRACTURE:  - Evaluated the patient's history of lower back vertebral fracture from over a year ago, which healed without surgical intervention.    HYPOTHYROID FUNCTION:  - Identified discrepancy in thyroid medication status; patient reports discontinuation but no clear documentation found.  - Ordered comprehensive labs including thyroid function tests.  - Will follow up after lab results to determine if thyroid medication needs to be restarted.              Follow up in about 6 months (around 12/4/2025) for HTN, CAD with angina and chronic illnesses with labs prior to visit.    This note was generated with the assistance of ambient listening technology. Verbal consent was obtained by the patient and accompanying visitor(s) for the recording of patient appointment to facilitate this note. I attest to having reviewed and edited the generated note for accuracy, though some syntax or spelling errors may persist. Please contact the author of this note for any clarification.         [1]   No current facility-administered medications for this visit.     No current outpatient medications on file.     Facility-Administered Medications Ordered in Other Visits   Medication Dose Route Frequency Provider Last Rate Last Admin    amiodarone 360 mg/200 mL (1.8 mg/mL) infusion  1 mg/min Intravenous Continuous Yusuf Chase MD        amiodarone 360 mg/200 mL (1.8 mg/mL) infusion  0.5 mg/min Intravenous Continuous Yusuf Chase MD 16.7 mL/hr at 06/09/25 0450 0.5 mg/min at 06/09/25 0450    amiodarone in dextrose 150 mg/100 mL (1.5 mg/mL) loading dose 150 mg  150 mg Intravenous Once Yusuf Chase MD        atorvastatin tablet 40 mg  40 mg Oral QHS Nima Ervin MD   40 mg at 06/08/25 2022    carvediloL tablet 12.5 mg  12.5 mg Oral BID WM Nima Ervin MD   12.5 mg at 06/08/25 1700    clopidogreL tablet 75 mg  75 mg Oral Daily Enoch Tirado MD   75 mg at 06/07/25 1514    dextrose 50%  injection 12.5 g  12.5 g Intravenous PRN Nima Ervin MD        dextrose 50% injection 25 g  25 g Intravenous PRN Nima Ervin MD        EPINEPHrine 0.1 mg/mL injection    Code/trauma/sedation Celso Mchugh MD   1 mg at 06/09/25 0749    glucagon (human recombinant) injection 1 mg  1 mg Intramuscular PRN Nima Ervin MD        glucose chewable tablet 16 g  16 g Oral PRN Nima Ervin MD        glucose chewable tablet 24 g  24 g Oral PRN Nima Ervin MD        heparin 25,000 units in dextrose 5% (100 units/ml) IV bolus from bag LOW INTENSITY nomogram - [facility]  49 Units/kg Intravenous PRN Jeramy Fink Jr., MD        heparin 25,000 units in dextrose 5% (100 units/ml) IV bolus from bag LOW INTENSITY nomogram - [facility]  30 Units/kg Intravenous PRN Jeramy Fink Jr., MD        heparin 25,000 units in dextrose 5% 250 mL (100 units/mL) infusion LOW INTENSITY nomogram - [facility]  0-40 Units/kg/hr Intravenous Continuous Jeramy Fink Jr., MD 8.2 mL/hr at 06/09/25 0004 10 Units/kg/hr at 06/09/25 0004    hydrOXYzine pamoate capsule 25 mg  25 mg Oral Nightly PRN Nima Ervin MD   25 mg at 06/08/25 2022    levothyroxine tablet 75 mcg  75 mcg Oral Before breakfast Nima Ervin MD   75 mcg at 06/08/25 0510    naloxone 0.4 mg/mL injection 0.02 mg  0.02 mg Intravenous PRN Nima Ervin MD        NORepinephrine 4 mg in dextrose 5% 250 mL infusion (premix)  0-3 mcg/kg/min Intravenous Continuous Alejandro Woo MD   Held at 06/09/25 0707    perflutren protein-A microsphr 0.22 mg/mL IV susp  0.5 mL Intravenous Once Enoch Tirado MD        potassium bicarbonate disintegrating tablet 50 mEq  50 mEq Oral Once Lizz Anderson MD        propofol (DIPRIVAN) 10 mg/mL infusion  0-50 mcg/kg/min Intravenous Continuous Lizz Anderson MD 4.8 mL/hr at 06/09/25 0732 10 mcg/kg/min at 06/09/25 0732    sevelamer carbonate tablet 1,600 mg  1,600 mg Oral TID WM  Alexia Yarbrough MD   1,600 mg at 06/08/25 1702    sodium chloride 0.9% flush 10 mL  10 mL Intravenous Q12H PRN Nima Ervin MD

## 2025-06-09 NOTE — ASSESSMENT & PLAN NOTE
Patient with chest pain worsened while on HD  Trops are flat 0.14  EKG with no ischemic changes  Echo with worsening wall motion as per cardio  Started on heparin drip  Seen by cardiology, plan was for Left heart cath today but patient had a cardiac arrest this morning

## 2025-06-09 NOTE — EICU
CODE BLUE VIRTUAL ICU NURSE NOTE       Admit Date: 2025  LOS: 2  Code Status: DNR   : 1960  Age: 64 y.o.  Weight:   Wt Readings from Last 1 Encounters:   25 80.8 kg (178 lb 2.1 oz)     Sex: male  Race: White   Bed: K566/K566 A:   MRN: [id number]  Code Start Time 07:46  Was the patient discharged from a PACU within last 24 hours? No   Did the patient receive conscious sedation/general anesthesia in last 24 hours? No   Was the patient in the ED within the past 24 hours? No   Was the patient on NIPPV within the past 24 hours? No   Attending Physician: Nima Ervin MD  Primary Service: [facility] MED[facility]INE PHYSICIAN TEAM 2     SITUATION    Why is the patient in the hospital?: History of MI (myocardial infarction)    Patient has a past medical history of Acute congestive heart failure, Allergy, Anemia, Anticoagulant long-term use, Arthritis, CKD (chronic kidney disease) stage 4, GFR 15-29 ml/min, Closed fracture of transverse process of lumbar vertebra, COPD (chronic obstructive pulmonary disease), Coronary artery disease, Heart attack, Hematuria, Hemothorax, Hyperlipidemia, Hypertension, Hyperuricemia, Hypocalcemia, Hypokalemia, Hypophosphatemia, Hypothyroidism, PAD (peripheral artery disease), Proteinuria, and Vitamin D deficiency.    Last Vitals:  Temp: 92.3 °F (33.5 °C) (800)  Pulse: 105 (800)  Resp: 38 (800)  BP: 162/102 (800)  SpO2: 100 % (800)  Arterial Line BP: 165/56 (702)    24 Hours Vitals Range:  Temp:  [92.3 °F (33.5 °C)-98.4 °F (36.9 °C)]   Pulse:  []   Resp:  [14-48]   BP: ()/()   SpO2:  [91 %-100 %]   Arterial Line BP: (129-176)/(47-59)     Labs:  Recent Labs     25  0813 25  1510 25  0328   WBC 9.80 8.33 13.86*   HGB 10.5* 10.4* 9.8*   HCT 31.2* 30.0* 29.8*   * 120* 149*       Recent Labs     25  0813 25  1510 25  0328 25  0650   * 131* 131*  131* 130*   K  2.8* 3.7 2.8*  2.8* 3.9   CL 86* 85* 83*  83* 84*   CO2 24 27 20*  20* 24   BUN 44* 52* 62*  63* 66*   CREATININE 6.7* 7.5* 8.3*  8.2* 8.5*   GLU 98 95 186*  186* 160*   PHOS 6.9*  --  10.8*  --    MG 1.5*  --  6.4*  --         Recent Labs     06/09/25  0534   PH 7.447   PCO2 45.6*   PO2 200*   HCO3 31.5*   POCSATURATED 99.5        ASSESSMENT/INTERVENTIONS    Time of CPR initiation: 07:46  Time of first shock: n/a  Time of first Epinephrine dose: 07:49  Airway confirmation Capnometry (numeric ETCO2)    Please see Code Narrator Documentation for more details.    OUTCOME    ROSC obtained at 07:50    Disposition: Remain in room 566    CODE TEAM MEMBERS    Code Leader: Celso Howell    Resident/LACY: ANA    BEDSIDE RN: Katelyn Sarmiento RN    CHARGE RN: Leann Sharif RN    RRT: Arpit Shultz RRT          Patient presenting with sharp L sided CP- seems [facility] ED eval wnl. Given a trial of pain meds and methocarbamol. Rec fu with local Chiropractor or DO if pain persists.

## 2025-06-09 NOTE — ASSESSMENT & PLAN NOTE
Patient has long standing persistent (>12 months) atrial fibrillation. Patient is currently in sinus rhythm. [id number] Score: 1. The patients heart rate in the last 24 hours is as follows:  Pulse  Min: 53  Max: 127     Antiarrhythmics  amiodarone 360 mg/200 mL (1.8 mg/mL) infusion, Continuous, Intravenous  amiodarone 360 mg/200 mL (1.8 mg/mL) infusion, Continuous, Intravenous    Anticoagulants  heparin 25,000 units in dextrose 5% 250 mL (100 units/mL) infusion LOW INTENSITY nomogram - [facility], Continuous, Intravenous  heparin 25,000 units in dextrose 5% (100 units/ml) IV bolus from bag LOW INTENSITY nomogram - [facility], As needed (PRN), Intravenous  heparin 25,000 units in dextrose 5% (100 units/ml) IV bolus from bag LOW INTENSITY nomogram - [facility], As needed (PRN), Intravenous    Plan  - Replete lytes with a goal of K>4, Mg >2  - Patient is anticoagulated, see medications listed above.  - Patient's afib is currently controlled  -

## 2025-06-09 NOTE — PLAN OF CARE
SW met with pt's wife Karmen [phone] at bedside to introduce myself and offer therapeutic use of self. Pt prognosis is poor, White board updated with CM name and contact information. Pt's family encouraged to call with any questions or concerns.  Cm will continue to follow pt through transitions of care and assist with any discharge needs.    Kerwin Gill, CHELI  [phone]    Future Appointments   Date Time Provider Department Center   12/4/2025  2:00 PM Geeta Coffey MD [facility]        06/09/25 1138   Discharge Assessment   Assessment Type Discharge Planning Assessment   Confirmed/corrected address, phone number and insurance Yes   Confirmed Demographics Correct on Facesheet   Source of Information family   People in Home spouse   Name(s) of People in Home wife Karmen [phone]   Do you expect to return to your current living situation? No   Do you have help at home or someone to help you manage your care at home? Yes   Who are your caregiver(s) and their phone number(s)? wife Karmen [phone]   Prior to hospitilization cognitive status: Coma/Sedated/Intubated   Current cognitive status: Coma/Sedated/Intubated   Do you have prescription coverage? Yes   Who is going to help you get home at discharge? wife Karmen [phone]   Are you on dialysis? No   Do you take coumadin? No   DME Needed Upon Discharge  none

## 2025-06-09 NOTE — ASSESSMENT & PLAN NOTE
Patient's blood pressure range in the last 24 hours was: BP  Min: 63/38  Max: 280/192.The patient's inpatient anti-hypertensive regimen is listed below:  Current Antihypertensives       Plan  - BP is controlled, no changes needed to their regimen

## 2025-06-09 NOTE — NURSING
Patient previously being cooled to 33 celsius. Per Dr. Do will adjust TTM to 36 celsius. Patient now controlled at 36

## 2025-06-09 NOTE — PROVIDER PROGRESS NOTES - EMERGENCY DEPT.
Encounter Date: 6/9/25      ED Physician Progress Notes        I was the ED physician called to assist with cardiac arrest as code blue was called.  Patient is under care of admitting team.     Per nursing he was on telemetry unit, he was noted to be breathing down and nurses went to evaluate him, they found him in respiratory arrest and impending asystole and CPR was started code blue was called.  ACLS was initiated.  On my arrival the patient was receiving ACLS, the primary team was at bedside, and he was actively being bagged.  He was intubated.  ACLS was continued, see nursing documentation for exact details, adjuvant including calcium and bicarb were given as he was a dialysis patient.  Lytics were considered however deferred in the setting of known recent significant anemia, questionable ongoing bleeding.  Rosc was ultimately obtained, on recheck patient was bradycardic with a rate around 30 but with a pulse.  Atropine was given with improvement of heart rate up to 120.  End-tidal capnography was 40.  He was moved to the ICU, admitting team who is present at bedside is continuing care      CPR, I personally supervised at least 10 minutes of CPR    Intubation, patient was intubated via endotracheal intubation with MAC 4 video laryngoscope, with a 7.0 ET tube secured at 23 cm at the lip.  Passage was confirmed by auscultation, end-tidal capnography, direct visualization of passage,.  Tolerated procedure without complication was improved after procedure, consent was emergent

## 2025-06-09 NOTE — ASSESSMENT & PLAN NOTE
Patient resting 3 times this morning  Currently intubated and mechanically ventilated  Patient wife is at bedside, he is DNR now  Hypothermia protocol initiated

## 2025-06-10 NOTE — PLAN OF CARE
Problem: Adult Inpatient Plan of Care  Goal: Plan of Care Review  Outcome: Progressing  Goal: Patient-Specific Goal (Individualized)  Outcome: Progressing  Goal: Absence of Hospital-Acquired Illness or Injury  Outcome: Progressing  Goal: Optimal Comfort and Wellbeing  Outcome: Progressing  Goal: Readiness for Transition of Care  Outcome: Progressing     Problem: Wound  Goal: Optimal Coping  Outcome: Progressing  Goal: Optimal Functional Ability  Outcome: Progressing  Goal: Absence of Infection Signs and Symptoms  Outcome: Progressing  Goal: Improved Oral Intake  Outcome: Progressing  Goal: Optimal Pain Control and Function  Outcome: Progressing  Goal: Skin Health and Integrity  Outcome: Progressing  Goal: Optimal Wound Healing  Outcome: Progressing     Problem: Fall Injury Risk  Goal: Absence of Fall and Fall-Related Injury  Outcome: Progressing     Problem: Comorbidity Management  Goal: Blood Pressure in Desired Range  Outcome: Progressing     Problem: Hemodialysis  Goal: Safe, Effective Therapy Delivery  Outcome: Progressing  Goal: Effective Tissue Perfusion  Outcome: Progressing  Goal: Absence of Infection Signs and Symptoms  Outcome: Progressing     Problem: Infection  Goal: Absence of Infection Signs and Symptoms  Outcome: Progressing     Problem: Mechanical Ventilation Invasive  Goal: Effective Communication  Outcome: Progressing  Goal: Optimal Device Function  Outcome: Progressing  Goal: Mechanical Ventilation Liberation  Outcome: Progressing  Goal: Optimal Nutrition Delivery  Outcome: Progressing  Goal: Absence of Device-Related Skin and Tissue Injury  Outcome: Progressing  Goal: Absence of Ventilator-Induced Lung Injury  Outcome: Progressing

## 2025-06-10 NOTE — PLAN OF CARE
Patient on ventilator with documented settings.  Alarms are set and functioning with adequate volumes.  AMBU bag and mask at bedside..

## 2025-06-10 NOTE — PROGRESS NOTES
Progress Note  [facility]    Attending: Dr. Do  Fellow: Dr. Anderson  Admit Date: 6/7/2025  Today's Date: 06/10/2025    ASSESSMENT & RECOMMENDATIONS       CNS/Neuro:    Acute encephalopathy  Enlarged superior ophthalmic veins bilaterally  C/f anoxic-ischemic brain injury in the background of multiple arrests  - TTM, warming today  - daily SAT  - on propofol  - neuro prognostication  - localizing to pain today with cough, corneal and pupillary  intact    Cardiovascular:    Cardiac Arrest x 3  Significant CAD with previous MI and cardiac arrest in 2019  Shock, liekly cardiogenic   V-tach  Prolonged QTC  - bedside Us with global dysfunction  -  continue amio gtt  - TTM, warming now   - repeat EKG today, , Mag 2.5  - Levo for Map > 65  - cardiology on board    Respiratory:    Acute hypoxemic respiratory failure  - BL interstitial infiltrates on CXR  - pulmonary edema suspected in the background of 3 cardiac arrests and global cardiac dysfunction   - lung protective ventilation: peak airway < 40, Pplat < 30  - ABGs  - wean FiO2 as tolerated  - SAT/SBT    GI/Metabolic: no acute concerns at this time     Renal:    ESRD  - nephrology following   - may need UF soon    Hopokalemia  - replacing prn for goal K> 4, patient is sensitive to K  - 50 meq today via OGT  - q4h BMP and mag     Heme:    Normocytic anemia  - likely in the background of ESRD  - transfuse to > 7    Endo:   Glucose within goal    Subclinical hypothyroidism  - TSH high, t4 wnl  - suspect in the setting of critical illness  - CTM        ID: no acute concerns       SUBJECTIVE:     remains intubated and sedated. Cough and gag absent. Pupil and corneal weak but present.    OBJECTIVE:     Vital Signs Trends/Hx Reviewed  Vitals:    06/10/25 0630 06/10/25 0645 06/10/25 0722 06/10/25 0725   BP: (!) 113/57 (!) 115/56     Pulse: (!) 59 (!) 59 (!) 59    Resp: (!) 28 (!) 28 (!) 28    Temp: (!) 94.3 °F (34.6 °C) (!) 94.3 °F  "(34.6 °C) (!) 94.3 °F (34.6 °C)    TempSrc:    Core Esophageal   SpO2: 100% 100% 100%    Weight:       Height:           Ventilator settings:   Vent Type: NKV-550 (6/10/2025  7:22 AM)    Vent Mode: A/CMV-VC  Oxygen Concentration (%):  [] 40  Resp Rate Total:  [28 br/min-41 br/min] 29 br/min  Vt Set:  [480 mL] 480 mL  PEEP/CPAP:  [5 cmH20-6.3 cmH20] 5 cmH20  Mean Airway Pressure:  [12.5 jzB28-10 cmH20] 12.5 cmH20        Physical Exam:  Physical Exam  Constitutional:       General: He is not in acute distress.     Appearance: He is ill-appearing.   HENT:      Head: Normocephalic and atraumatic.   Cardiovascular:      Rate and Rhythm: Normal rate and regular rhythm.   Pulmonary:      Comments: Mechanically ventilated  Abdominal:      General: Abdomen is flat. Bowel sounds are normal.      Palpations: Abdomen is soft.   Skin:     General: Skin is dry.   Neurological:      Mental Status: He is disoriented.      Comments: Cough corneal and pupil reflex intact. Localizing to pain with left arm         Labs: reviewed    Micro: reviewed    Imaging reviewed    MAR: reviewed      Feeding/fluids: starting TF  Analgesia: N/A  Sedation: Propofol  Thromboprophylaxis: holding?  Head up position: Yes  Ulcer prophylaxis: PPI  Glycemic control: Yes  Spontaneous breathing trial: daily   Bowel care: Yes  Indwelling catheter removal: Peripheral line, right central IJV, ETT, OGT, HD AV fistula left upper arm  Deescalation of antibiotics: N/A     Code status: DNR    Disposition: remain in ICU    Thank you for allowing us to participate in the care of this patient. Please call with questions.    Cinthya Munoz MD  [facility], PGY-1    Pt seen and examined with Pulmonary/Critical Care team and this note was reviewed and validated with the following additional comments: Today localizes to pain, grimaces, and mouths "ow." Does not follow voice commands. Off pressors. Mechanics of breathing good. Still very hypokalemic.QTc " very prolonged. Discussed with wife.  WE will stay the course.    Critical Care time was spent validating the history and physical exam, reviewing the lab and imaging results, and discussing the care of the patient with the bedside nurse and the patient and/or surrogates. This critical care time did not overlap with that of any other provider or involve time for any procedures.  This patient has a high probability of sudden clinically significant deterioration which requires the highest level of physician preparedness to intervene urgently. I managed/supervised life or organ supporting interventions that required frequent physician assessments. I devoted my full attention in the ICU to the direct care of this patient for this period of time. Organ systems which are failing and require intensive, critical care support are: neurologic, cardiovascular, renal, respiratory  Critical Care time: 60 minutes    Mickey Doe MD  Phone [phone]

## 2025-06-10 NOTE — PROGRESS NOTES
Nephrology Consult     Consult Requested By: Nima Ervin MD  Reason for Consult: ESRD     SUBJECTIVE:     ?    Review of Systems   Unable to perform ROS: Critical illness       Past Medical History:   Diagnosis Date    Acute congestive heart failure 09/13/2024    Allergy     Anemia     Anticoagulant long-term use     Arthritis     CKD (chronic kidney disease) stage 4, GFR 15-29 ml/min     Closed fracture of transverse process of lumbar vertebra 02/16/2024    COPD (chronic obstructive pulmonary disease) 08/20/2021    Coronary artery disease     Heart attack 10/04/2019    Hematuria     Hemothorax     Hyperlipidemia     Hypertension     Hyperuricemia     Hypocalcemia     Hypokalemia     Hypophosphatemia     Hypothyroidism 03/02/2023    PAD (peripheral artery disease)     Proteinuria     Vitamin D deficiency           OBJECTIVE:     Vital Signs (Most Recent)  Vitals:    06/10/25 1101 06/10/25 1115 06/10/25 1118 06/10/25 1130   BP: (!) 123/59  (!) 114/54 (!) 127/57   Pulse: 74 73 73 76   Resp: (!) 28 (!) 28 (!) 28 16   Temp: 97.7 °F (36.5 °C) 97.3 °F (36.3 °C) 97.2 °F (36.2 °C) 97.2 °F (36.2 °C)   TempSrc: Core Esophageal   Core Esophageal   SpO2: 99% 99% 99% 98%   Weight:       Height:             Date 06/10/25 0700 - 06/11/25 0659   Shift 1499-5999 1695-0716 0678-0279 24 Hour Total   INTAKE   I.V.(mL/kg) 375.2(4.6)   375.2(4.6)   IV Piggyback 15.5   15.5   Shift Total(mL/kg) 390.6(4.8)   390.6(4.8)   OUTPUT   Shift Total(mL/kg)       Weight (kg) 80.8 80.8 80.8 80.8             Medications:   aspirin  81 mg Per OG tube Daily    atorvastatin  40 mg Per OG tube QHS    clopidogreL  75 mg Per OG tube Daily    levothyroxine  75 mcg Per OG tube Before breakfast    pantoprazole  40 mg Intravenous Daily    perflutren protein-a microsphr  0.5 mL Intravenous Once    sevelamer carbonate  1.6 g Per OG tube TID             Physical Exam  Vitals and nursing note reviewed.   Constitutional:       Comments: Intubated sedated  with propofol   HENT:      Head: Normocephalic and atraumatic.      Mouth/Throat:      Pharynx: No oropharyngeal exudate.   Eyes:      General: No scleral icterus.     Conjunctiva/sclera: Conjunctivae normal.      Pupils: Pupils are equal, round, and reactive to light.   Neck:      Vascular: No JVD.   Cardiovascular:      Rate and Rhythm: Normal rate and regular rhythm.      Heart sounds: Normal heart sounds. No murmur heard.     No friction rub.   Pulmonary:      Effort: Pulmonary effort is normal. No respiratory distress.      Breath sounds: Normal breath sounds. No wheezing or rales.   Abdominal:      General: Bowel sounds are normal. There is no distension.      Palpations: Abdomen is soft.      Tenderness: There is no abdominal tenderness.   Musculoskeletal:         General: Normal range of motion.      Cervical back: Normal range of motion and neck supple.      Right lower leg: No edema.      Left lower leg: No edema.      Comments: LUE AVG    Skin:     General: Skin is warm and dry.      Findings: No erythema or rash.   Neurological:      Comments: Unresponsive.  Gag and cough reflexes are absent, posturing, sluggish pupillary reflex     Psychiatric:         Mood and Affect: Affect normal.         Cognition and Memory: Memory normal.         Laboratory:  Recent Labs   Lab 06/08/25  1510 06/09/25  0328 06/09/25  1026 06/10/25  0416   WBC 8.33 13.86*  --  9.85   HGB 10.4* 9.8*  --  8.1*   HCT 30.0* 29.8* 30.8* 22.3*   * 149*  --  136*   MONO 7.6  0.63 4.5  0.62  --  5.0  0.49     Recent Labs   Lab 06/09/25  2354 06/10/25  0416 06/10/25  0902   * 129* 129*   K 3.6 3.3* 3.3*   CL 84* 84* 83*   CO2 25 23 27   BUN 77* 82* 82*   CREATININE 8.6* 8.9* 9.0*   CALCIUM 8.0* 7.8* 7.7*   PHOS 7.1* 7.4* 7.2*       Diagnostic Results:  X-Ray: Reviewed  US: Reviewed  Echo: Reviewed    Left Ventricle: The left ventricle is mildly dilated. There is eccentric hypertrophy. Regional wall motion abnormalities  present. See diagram for wall motion findings. There is mildly reduced systolic function with a visually estimated ejection fraction of 45 - 50%. There is indeterminate diastolic function.Elevated left ventricular filling pressure.    Right Ventricle: Normal right ventricular cavity size. Wall thickness is normal. Systolic function is normal.    Left Atrium: Left atrium is mildly dilated.    Right Atrium: Right atrium is mildly dilated.    Mitral Valve: There is mild to moderate regurgitation.    Pulmonic Valve: There is moderate regurgitation.    Pulmonary Artery: There is pulmonary hypertension. The estimated pulmonary artery systolic pressure is 47 mmHg.    IVC/SVC: Intermediate venous pressure at 8 mmHg.  ASSESSMENT/PLAN:   6/9/25:Patient found down site noted in his room around 3:00 a.m., resuscitative measure initiated.  Transferred to ICU.  Coded 2 more times this morning around 7:00 a.m..  Right now is on cooling protocol.  Patient's wife is bedside discussed the grave prognosis.  For now supportive measure only.  Patient is DNR.  I reviewed all of his electrolytes and volume status.  As of right now no indication for emergent dialysis.  But monitoring closely.  If the patient is more hemodynamically stable and there is any chance of surviving this episode, might need to be on CRRT    6/10/25:  Situation is unchanged from yesterday.  Still no gallop or no cough reflex.  Some corneal reflex.  Patient is grimacing to pain.  With any attempt to reposition patient is hemodynamically unstable and becomes bradycardic.  Still on Levophed but the dose is lower compared to yesterday.  Volume and electrolytes acceptable.  No indication for emergent dialysis today.       Total critical care time 35 minutes: included management of organ failures related to critical illness, titration of continuous infusions, review of pertinent labs and imaging studies, discussion with primary team      Thank you for the consult, will  follow  With any question please call [phone]  Alexia Yarbrough MD    [facility]    ESPERANZA Payne MD,   MD BRANDY Arredondo MD E. V. Harmon, NP    [address]  [place], [address]  [phone]

## 2025-06-10 NOTE — ASSESSMENT & PLAN NOTE
Patient with chest pain worsened while on HD  Trops are flat 0.14  EKG with no ischemic changes  Echo with worsening wall motion as per cardio  Started on heparin drip  Seen by cardiology, plan was for Left heart cath 06/09 but patient had a cardiac arrest that  morning

## 2025-06-10 NOTE — PLAN OF CARE
Problem: Adult Inpatient Plan of Care  Goal: Plan of Care Review  Outcome: Progressing     Problem: Skin Injury Risk Increased  Goal: Skin Health and Integrity  Outcome: Progressing     Problem: Mechanical Ventilation Invasive  Goal: Optimal Device Function  Outcome: Progressing  Goal: Absence of Device-Related Skin and Tissue Injury  Outcome: Progressing

## 2025-06-10 NOTE — ASSESSMENT & PLAN NOTE
Anemia is likely due to chronic disease due to ESRD. Most recent hemoglobin and hematocrit are listed below.  Recent Labs     06/08/25  1510 06/09/25  0328 06/09/25  1026 06/10/25  0416   HGB 10.4* 9.8*  --  8.1*   HCT 30.0* 29.8* 30.8* 22.3*     Plan  - Monitor serial CBC: Daily  - Transfuse PRBC if patient becomes hemodynamically unstable, symptomatic or H/H drops below 7/21.  - Patient has not received any PRBC transfusions to date  - Patient's anemia is currently stable

## 2025-06-10 NOTE — EICU
Intervention Initiated From:  [facility] / EICU    Margie intervened regarding:  Rounding (Video assessment)    Comments: Virtual ICU Quality Rounds    Admit Date: 6/7/2025  Hospital Day: 3    ICU Day: 1d 6h    24H Vital Sign Range:  Temp:  [87.3 °F (30.7 °C)-96.3 °F (35.7 °C)]   Pulse:  [31-67]   Resp:  [14-31]   BP: ()/()   SpO2:  [89 %-100 %]     VICU Surveillance Screening    Daily review of  line necessity(optional): Central line(s) in place and Urinary catheter in place    Central line type (optional): Triple lumen catheter    Central line indications : Vasopressors (in past 24/hrs), Amiodarone, and Multiple infusions    Vann Indications : Critically ill in the intensive care unit requiring hourly urine output monitoring     LDA reconciliation : Yes

## 2025-06-10 NOTE — PROGRESS NOTES
[facility] Medicine  Progress Note    Patient Name: Domingo Gross  MRN: [id number]  Patient Class: IP- Inpatient   Admission Date: 6/7/2025  Length of Stay: 3 days  Attending Physician: Nima Ervin MD  Primary Care Provider: Geeta Coffey MD        Subjective     Principal Problem:Cardiac arrest        HPI:  64 y.o. male with PMH ESRD on HD, PAD with multiple interventions, CAD (mid LAD/prox RCA PCI 3/2019 and prox LCA in 10/2019 following out of hospital cardiac arrest), after cardiac arrest in setting of prox LCX occlusion treated with PCI, 9/16/2024 s/p intervention w cutting/shockwave PTCA to Lcx, 01/24/25 PCI of OM, new reduction in EF presents to ER with substernal chest pain that radiates to his throat. Report his chest pain feels like heaviness as if something is heavy sitting on his chest, started last night at rest, he took sublingual nitro which only slightly helped but he was still feeling it, today as he was having HD the pain got worse so he came to the ED.    Patient denies smoking or alcohol use        Overview/Hospital Course:  No notes on file    Interval History:     I have seen and examined the patient   today    Patient is off levophed and off sedation since the morning, he opens his eyes, has corneal and cough reflex, he is localizing to pain.    His wife is at bedside and was updated     Review of Systems   Unable to perform ROS: Intubated     Objective:     Vital Signs (Most Recent):  Temp: 97.9 °F (36.6 °C) (06/10/25 1529)  Pulse: 83 (06/10/25 1529)  Resp: (!) 28 (06/10/25 1600)  BP: (!) 114/55 (06/10/25 1600)  SpO2: 100 % (06/10/25 1600) Vital Signs (24h Range):  Temp:  [91.9 °F (33.3 °C)-97.9 °F (36.6 °C)] 97.9 °F (36.6 °C)  Pulse:  [51-83] 83  Resp:  [16-28] 28  SpO2:  [97 %-100 %] 100 %  BP: ()/(49-67) 114/55     Weight: 80.8 kg (178 lb 2.1 oz)  Body mass index is 25.56 kg/m².    Intake/Output Summary (Last 24 hours) at 6/10/2025 1622  Last data  filed at 6/10/2025 0900  Gross per 24 hour   Intake 1657.46 ml   Output 70 ml   Net 1587.46 ml         Physical Exam  Constitutional:       Appearance: He is ill-appearing.   HENT:      Head: Normocephalic.   Cardiovascular:      Rate and Rhythm: Normal rate.   Pulmonary:      Comments: Intubated mechanically ventilated  Abdominal:      Palpations: Abdomen is soft.   Skin:     General: Skin is warm.   Neurological:      Comments: Off sedation, has corneal and gag reflex, he is localizing to pain               Significant Labs: All pertinent labs within the past 24 hours have been reviewed.    Significant Imaging: I have reviewed all pertinent imaging results/findings within the past 24 hours.      Assessment & Plan  Primary hypertension  Patient's blood pressure range in the last 24 hours was: BP  Min: 99/49  Max: 153/67.The patient's inpatient anti-hypertensive regimen is listed below:  Current Antihypertensives       Plan  - BP is controlled, no changes needed to their regimen    Hyperlipidemia    Continue statin  Anemia due to chronic kidney disease, on chronic dialysis  Anemia is likely due to chronic disease due to ESRD. Most recent hemoglobin and hematocrit are listed below.  Recent Labs     06/08/25  1510 06/09/25  0328 06/09/25  1026 06/10/25  0416   HGB 10.4* 9.8*  --  8.1*   HCT 30.0* 29.8* 30.8* 22.3*     Plan  - Monitor serial CBC: Daily  - Transfuse PRBC if patient becomes hemodynamically unstable, symptomatic or H/H drops below 7/21.  - Patient has not received any PRBC transfusions to date  - Patient's anemia is currently stable    Hypothyroidism  Cont levothyroxine     Paroxysmal atrial fibrillation  Patient has long standing persistent (>12 months) atrial fibrillation. Patient is currently in sinus rhythm. [id number] Score: 1. The patients heart rate in the last 24 hours is as follows:  Pulse  Min: 51  Max: 85     Antiarrhythmics  amiodarone tablet 400 mg, 2 times daily, Per OG tube  amiodarone  tablet 200 mg, Daily, Per OG tube    Anticoagulants  heparin 25,000 units in dextrose 5% 250 mL (100 units/mL) infusion LOW INTENSITY nomogram - [facility], Continuous, Intravenous  heparin 25,000 units in dextrose 5% (100 units/ml) IV bolus from bag LOW INTENSITY nomogram - [facility], As needed (PRN), Intravenous  heparin 25,000 units in dextrose 5% (100 units/ml) IV bolus from bag LOW INTENSITY nomogram - [facility], As needed (PRN), Intravenous    Plan  - Replete lytes with a goal of K>4, Mg >2  - Patient is anticoagulated, see medications listed above.  - Patient's afib is currently controlled  -      History of MI (myocardial infarction)  Patient with chest pain worsened while on HD  Trops are flat 0.14  EKG with no ischemic changes  Echo with worsening wall motion as per cardio  Started on heparin drip  Seen by cardiology, plan was for Left heart cath 06/09 but patient had a cardiac arrest that  morning    HFrEF (heart failure with reduced ejection fraction)    Repeat echo showed   Left Ventricle: The left ventricle is moderately dilated. Mildly increased wall thickness. There is eccentric hypertrophy. Regional wall motion abnormalities present. See diagram for wall motion findings. There is moderately reduced systolic function with a visually estimated ejection fraction of 35 - 40%. Quantitated ejection fraction is 38%. Unable to assess diastolic function due to poor image quality.    Right Ventricle: The right ventricle is normal in size Systolic function is normal. TAPSE is 2.3 cm.    Overall the study quality was technically difficult.  Cardiac arrest  Patient had cardiac arrest 3 times this morning  Currently intubated and mechanically ventilated  Patient wife is at bedside, he is DNR now  Hypothermia protocol initiated, rewarming today  Patient has some cortical function, he has corneal and gag reflex, he opens his eyes and responds to pain    ESRD (end stage renal disease)   >>ASSESSMENT AND PLAN FOR ESRD (END STAGE RENAL  DISEASE) WRITTEN ON 6/7/2025  5:21 PM BY NIMA ERVIN MD    Creatine stable for now. BMP reviewed- noted Estimated Creatinine Clearance: 8.2 mL/min (A) (based on SCr of 9.5 mg/dL (H)). according to latest data. Based on current GFR, CKD stage is end stage.  Monitor UOP and serial BMP and adjust therapy as needed. Renally dose meds. Avoid nephrotoxic medications and procedures.    ESRD On HD,  NSTEMI (non-ST elevated myocardial infarction)      VTE Risk Mitigation (From admission, onward)           Ordered     heparin 25,000 units in dextrose 5% (100 units/ml) IV bolus from bag LOW INTENSITY nomogram - [facility]  As needed (PRN)        Question:  Heparin Infusion Adjustment (DO NOT MODIFY ANSWER)  Answer:  [email or web address]\Images\Pharmacy\HeparinInfusions\heparin LOW INTENSITY nomogram for [facility] [id number]    06/07/25 1147     heparin 25,000 units in dextrose 5% (100 units/ml) IV bolus from bag LOW INTENSITY nomogram - [facility]  As needed (PRN)        Question:  Heparin Infusion Adjustment (DO NOT MODIFY ANSWER)  Answer:  [email or web address]\Images\Pharmacy\HeparinInfusions\heparin LOW INTENSITY nomogram for [facility] [id number]    06/07/25 1147     heparin 25,000 units in dextrose 5% 250 mL (100 units/mL) infusion LOW INTENSITY nomogram - [facility]  Continuous        Question:  Begin at (units/kg/hr)  Answer:  12    06/07/25 1147     IP VTE HIGH RISK PATIENT  Once         06/07/25 1155     Place sequential compression device  Until discontinued         06/07/25 1155                    Discharge Planning   FLACO:      Code Status: DNR   Medical Readiness for Discharge Date:                Critical care time spent on the evaluation and treatment of severe organ dysfunction, review of pertinent labs and imaging studies, discussions with consulting providers and discussions with patient/family: 35 minutes.            Nima Ervin MD  Department of Hospital Medicine   [facility] - Intensive Care

## 2025-06-10 NOTE — ASSESSMENT & PLAN NOTE
Patient had cardiac arrest 3 times this morning  Currently intubated and mechanically ventilated  Patient wife is at bedside, he is DNR now  Hypothermia protocol initiated, rewarming today  Patient has some cortical function, he has corneal and gag reflex, he opens his eyes and responds to pain

## 2025-06-10 NOTE — EICU
Intervention Initiated From:  [facility] / [facility]    Margie intervened regarding:  Rounding (Video assessment)  VICU Night Rounds Checklist  24H Vital Sign Range:  Temp:  [85.5 °F (29.7 °C)-98.4 °F (36.9 °C)]   Pulse:  []   Resp:  [14-48]   BP: ()/()   SpO2:  [62 %-100 %]   Arterial Line BP: (129-176)/(47-59)     Video rounds

## 2025-06-10 NOTE — CLINICAL REVIEW
Sepsis Screen (most recent)       Sepsis Screen (IP) - 06/10/25 1511       Is the patient's history or complaint suggestive of a possible infection? Yes  -    Are there at least two of the following signs and symptoms present? Yes  -    Sepsis signs/symptoms - Tachypnea Tachypnea     >20  -    Sepsis signs/symptoms - Altered Mental Status Altered Mental Status  -    Are any of the following organ dysfunction criteria present and not considered to be due to a chronic condition? Yes   ESRD -    Organ Dysfunction Criteria SBP < 90 or MAP < 65  -    Organ Dysfunction Criteria - Resp Comp Respiratory Compromise: Requiring > 5L NC  -    Organ Dysfunction Criteria - O2 O2 Saturation < 95% on room air  -    Initiate Sepsis Protocol No  -    Reason sepsis not considered Pt. receiving appropriate management   s/p cardiac arrest x3, not currently on abx -              User Key  (r) = Recorded By, (t) = Taken By, (c) = Cosigned By      Initials Name    Gauri Reno RN

## 2025-06-10 NOTE — PROGRESS NOTES
[facility] Progress Note     Attending Physician: Nima Ervin MD  Cardiology Attending Physician: Enoch Bray MD  Date of Admit: 2025      Subjective/Interval History:      Overnight night went into cardiac arrest multiple times. Intubated. Guarded prognosis.        DNR    Objective:     Last 24 Hour Vital Signs:  BP  Min: 63/38  Max: 280/192  Temp  Av.7 °F (33.7 °C)  Min: 85.5 °F (29.7 °C)  Max: 97.3 °F (36.3 °C)  Pulse  Av.8  Min: 31  Max: 127  Resp  Av.8  Min: 14  Max: 48  SpO2  Av.8 %  Min: 62 %  Max: 100 %  I/O last 3 completed shifts:  In: 1647.2 [I.V.:1291.4; IV Piggyback:355.8]  Out: 325 [Urine:325]    Physical Examination:None    Laboratory/Radiographic/Cardiac Data:  Personally Reviewed      Assessment/Plan:     Patient Active Problem List    Diagnosis Date Noted    Acute hypoxemic respiratory failure 2025    NSTEMI (non-ST elevated myocardial infarction) 2025    Thrombocytopenia, unspecified 2025    Hypokalemia 2025    Elevated troponin 2025    Insomnia 2024    Hyperparathyroidism 2024    S/P drug eluting coronary stent placement 2024    HFrEF (heart failure with reduced ejection fraction) 2024    Chronic anticoagulation 2024    History of MI (myocardial infarction) 2024    Hemodialysis catheter dysfunction 2024    S/P arteriovenous (AV) graft placement 2024    Membranous nephropathy determined by biopsy 2024    ESRD (end stage renal disease) 2024    Paroxysmal atrial fibrillation 2023    Hypothyroidism 2023    Anemia due to chronic kidney disease, on chronic dialysis 12/15/2021    COPD (chronic obstructive pulmonary disease) 2021    Nuclear sclerosis, bilateral 2020    Nephrotic range proteinuria 2020    Cardiac arrest 10/04/2019    Bilateral carotid artery stenosis     Atherosclerosis of native coronary artery of native heart with  unstable angina pectoris 03/29/2019    Venous insufficiency of both lower extremities 06/04/2018    Atherosclerosis of native artery of both lower extremities with intermittent claudication 03/02/2018    Hyperlipidemia 06/12/2015    DJD (degenerative joint disease), lumbar 05/27/2015    Claudication in peripheral vascular disease 06/13/2014    PAD (peripheral artery disease) 05/21/2014    Primary hypertension 04/29/2013        Cardiac arrest- PEA ARREST multiples ACLS round. WTC >600ms   Multiple cardiac arrest in patient with extensive CAD history  Possible Anoxic brain injury  ESRD  NSTEMI   Plan:   Continue Levophed to keep MAP > 65  Continue amiodarone  TTM   Replete electrolytes-- QTC>600  Guarded prognosis       Enoch Bray M.D.  Interventional Cardiology   [facility]    -Independently reviewing and interpreting (if not documented by myself) EKGs, echocardiograms, catherizations   -Obtaining a history, performing a relevant exam, counseling/educating the patient/family  -Documenting clinical information in the EMR including ordering of tests  -Care coordination and communicating with other health care professionals

## 2025-06-10 NOTE — ASSESSMENT & PLAN NOTE
>>ASSESSMENT AND PLAN FOR ESRD (END STAGE RENAL DISEASE) WRITTEN ON 6/7/2025  5:21 PM BY ERICH FRAZIER MD    Creatine stable for now. BMP reviewed- noted Estimated Creatinine Clearance: 8.2 mL/min (A) (based on SCr of 9.5 mg/dL (H)). according to latest data. Based on current GFR, CKD stage is end stage.  Monitor UOP and serial BMP and adjust therapy as needed. Renally dose meds. Avoid nephrotoxic medications and procedures.    ESRD On HD,

## 2025-06-10 NOTE — ASSESSMENT & PLAN NOTE
Patient has long standing persistent (>12 months) atrial fibrillation. Patient is currently in sinus rhythm. [id number] Score: 1. The patients heart rate in the last 24 hours is as follows:  Pulse  Min: 51  Max: 85     Antiarrhythmics  amiodarone tablet 400 mg, 2 times daily, Per OG tube  amiodarone tablet 200 mg, Daily, Per OG tube    Anticoagulants  heparin 25,000 units in dextrose 5% 250 mL (100 units/mL) infusion LOW INTENSITY nomogram - [facility], Continuous, Intravenous  heparin 25,000 units in dextrose 5% (100 units/ml) IV bolus from bag LOW INTENSITY nomogram - [facility], As needed (PRN), Intravenous  heparin 25,000 units in dextrose 5% (100 units/ml) IV bolus from bag LOW INTENSITY nomogram - [facility], As needed (PRN), Intravenous    Plan  - Replete lytes with a goal of K>4, Mg >2  - Patient is anticoagulated, see medications listed above.  - Patient's afib is currently controlled  -

## 2025-06-10 NOTE — SUBJECTIVE & OBJECTIVE
Interval History:     I have seen and examined the patient   today    Patient is off levophed and off sedation since the morning, he opens his eyes, has corneal and cough reflex, he is localizing to pain.    His wife is at bedside and was updated     Review of Systems   Unable to perform ROS: Intubated     Objective:     Vital Signs (Most Recent):  Temp: 97.9 °F (36.6 °C) (06/10/25 1529)  Pulse: 83 (06/10/25 1529)  Resp: (!) 28 (06/10/25 1600)  BP: (!) 114/55 (06/10/25 1600)  SpO2: 100 % (06/10/25 1600) Vital Signs (24h Range):  Temp:  [91.9 °F (33.3 °C)-97.9 °F (36.6 °C)] 97.9 °F (36.6 °C)  Pulse:  [51-83] 83  Resp:  [16-28] 28  SpO2:  [97 %-100 %] 100 %  BP: ()/(49-67) 114/55     Weight: 80.8 kg (178 lb 2.1 oz)  Body mass index is 25.56 kg/m².    Intake/Output Summary (Last 24 hours) at 6/10/2025 1622  Last data filed at 6/10/2025 0900  Gross per 24 hour   Intake 1657.46 ml   Output 70 ml   Net 1587.46 ml         Physical Exam  Constitutional:       Appearance: He is ill-appearing.   HENT:      Head: Normocephalic.   Cardiovascular:      Rate and Rhythm: Normal rate.   Pulmonary:      Comments: Intubated mechanically ventilated  Abdominal:      Palpations: Abdomen is soft.   Skin:     General: Skin is warm.   Neurological:      Comments: Off sedation, has corneal and gag reflex, he is localizing to pain               Significant Labs: All pertinent labs within the past 24 hours have been reviewed.    Significant Imaging: I have reviewed all pertinent imaging results/findings within the past 24 hours.

## 2025-06-10 NOTE — ASSESSMENT & PLAN NOTE
Patient's blood pressure range in the last 24 hours was: BP  Min: 99/49  Max: 153/67.The patient's inpatient anti-hypertensive regimen is listed below:  Current Antihypertensives       Plan  - BP is controlled, no changes needed to their regimen

## 2025-06-11 PROBLEM — J96.01 ACUTE RESPIRATORY FAILURE WITH HYPOXIA: Status: ACTIVE | Noted: 2025-06-11

## 2025-06-11 PROBLEM — I46.9 ANOXIC ENCEPHALOPATHY DUE TO CARDIAC ARREST: Status: ACTIVE | Noted: 2025-01-01

## 2025-06-11 PROBLEM — R57.0 CARDIOGENIC SHOCK: Status: ACTIVE | Noted: 2025-06-11

## 2025-06-11 PROBLEM — G93.1 ANOXIC ENCEPHALOPATHY DUE TO CARDIAC ARREST: Status: ACTIVE | Noted: 2025-06-11

## 2025-06-11 NOTE — PLAN OF CARE
Pt intubated and sedated with Propofol gtt @ 20 mcg/kg/min. Corneal reflexes intact, gag and cough absent while sedated. TTM maintenance phase continued. Afebrile, NSR on monitor.  MAP goal of 65 maintained. O2 sats maintained on ventilator with settings of , RR 28, PEEP 5, FIO2 30%. No BM this shift. Tube feeds continued through OG at 10mL/hr.  mL per manrique this shift. Labs reviewed. Safety maintained. Care ongoing.     Problem: Adult Inpatient Plan of Care  Goal: Plan of Care Review  Outcome: Progressing  Goal: Patient-Specific Goal (Individualized)  Outcome: Progressing  Goal: Absence of Hospital-Acquired Illness or Injury  Outcome: Progressing  Goal: Optimal Comfort and Wellbeing  Outcome: Progressing  Goal: Readiness for Transition of Care  Outcome: Progressing     Problem: Wound  Goal: Optimal Coping  Outcome: Progressing  Goal: Optimal Functional Ability  Outcome: Progressing  Goal: Absence of Infection Signs and Symptoms  Outcome: Progressing  Goal: Improved Oral Intake  Outcome: Progressing  Goal: Optimal Pain Control and Function  Outcome: Progressing  Goal: Skin Health and Integrity  Outcome: Progressing  Goal: Optimal Wound Healing  Outcome: Progressing     Problem: Fall Injury Risk  Goal: Absence of Fall and Fall-Related Injury  Outcome: Progressing     Problem: Comorbidity Management  Goal: Blood Pressure in Desired Range  Outcome: Progressing     Problem: Hemodialysis  Goal: Safe, Effective Therapy Delivery  Outcome: Progressing  Goal: Effective Tissue Perfusion  Outcome: Progressing  Goal: Absence of Infection Signs and Symptoms  Outcome: Progressing     Problem: Infection  Goal: Absence of Infection Signs and Symptoms  Outcome: Progressing     Problem: Skin Injury Risk Increased  Goal: Skin Health and Integrity  Outcome: Progressing     Problem: Delirium  Goal: Optimal Coping  Outcome: Progressing  Goal: Improved Behavioral Control  Outcome: Progressing  Goal: Improved Attention and  Thought Clarity  Outcome: Progressing  Goal: Improved Sleep  Outcome: Progressing     Problem: Mechanical Ventilation Invasive  Goal: Effective Communication  Outcome: Progressing  Goal: Optimal Device Function  Outcome: Progressing  Goal: Mechanical Ventilation Liberation  Outcome: Progressing  Goal: Optimal Nutrition Delivery  Outcome: Progressing  Goal: Absence of Device-Related Skin and Tissue Injury  Outcome: Progressing  Goal: Absence of Ventilator-Induced Lung Injury  Outcome: Progressing

## 2025-06-11 NOTE — CONSULTS
[place] - Intensive Care  Adult Nutrition  Consult Note    SUMMARY     Recommendations    1. Enteral Nutrition Management:   Tube feeding recommendations of Novasource Renal with goal rate of 40ml/hr.     -start TF at rate of 10ml/hr, increase by 10mls q 8 hours until goal rate of 40ml/hr is reached as tolerated.      TF to provide: 1920kcals, 87gPRO, 176gCHO, 688ml h2o     Flush with 60mls q 6 hours or per md order         2. Monitor labs and propofol kcals     3. Collaboration by nutrition professional with other providers    Goals: Patient to receive/consume >75% of EEN prior to RD follow up  Nutrition Goal Status: new  Communication of RD Recs: other (comment) (Consult, poc)    Nutrition Discharge Planning     Nutrition Discharge Planning: Too early to determine, pending clinical course    Assessment and Plan    Nutrition Problem  Increased nutrient needs    Related to (etiology):   ESRD   Decreased ability to consume sufficient protein and energy     Signs and Symptoms (as evidenced by):   Hemodialysis   Intubation  NPO    Interventions (treatment strategy):  Enteral Nutrition Management   Collaboration by nutrition professional with other providers    Nutrition Diagnosis Status:   New         Malnutrition Assessment           NFPE to be performed at follow up visit as warranted.                             Reason for Assessment    Reason For Assessment: consult, new tube feeding  Diagnosis:  (cardiac arrest, NSTEMI)  Patient Active Problem List   Diagnosis    Primary hypertension    PAD (peripheral artery disease)    Claudication in peripheral vascular disease    DJD (degenerative joint disease), lumbar    Hyperlipidemia    Atherosclerosis of native artery of both lower extremities with intermittent claudication    Venous insufficiency of both lower extremities    Bilateral carotid artery stenosis    Cardiac arrest    Nephrotic range proteinuria    Nuclear sclerosis, bilateral    COPD (chronic obstructive  pulmonary disease)    Anemia due to chronic kidney disease, on chronic dialysis    Hypothyroidism    Paroxysmal atrial fibrillation    ESRD (end stage renal disease)    Membranous nephropathy determined by biopsy    S/P arteriovenous (AV) graft placement    Hemodialysis catheter dysfunction    History of MI (myocardial infarction)    Chronic anticoagulation    S/P drug eluting coronary stent placement    Atherosclerosis of native coronary artery of native heart with unstable angina pectoris    Hyperparathyroidism    Insomnia    Hypokalemia    Elevated troponin    Thrombocytopenia, unspecified    HFrEF (heart failure with reduced ejection fraction)    Acute hypoxemic respiratory failure    NSTEMI (non-ST elevated myocardial infarction)    Cardiogenic shock    Anoxic encephalopathy due to cardiac arrest    Acute respiratory failure with hypoxia     Past Medical History:   Diagnosis Date    Acute congestive heart failure 09/13/2024    Allergy     Anemia     Anticoagulant long-term use     Arthritis     CKD (chronic kidney disease) stage 4, GFR 15-29 ml/min     Closed fracture of transverse process of lumbar vertebra 02/16/2024    COPD (chronic obstructive pulmonary disease) 08/20/2021    Coronary artery disease     Heart attack 10/04/2019    Hematuria     Hemothorax     Hyperlipidemia     Hypertension     Hyperuricemia     Hypocalcemia     Hypokalemia     Hypophosphatemia     Hypothyroidism 03/02/2023    PAD (peripheral artery disease)     Proteinuria     Vitamin D deficiency        General Information Comments:   6/11/25: Patient is currently NPO, on vent, on propofol with OG tube in place.  Patient admitted with NSTEMI and cardiac arrest.  RD consulted for TF recommendations, recommendations are provided above and in sticky notes.  PMH of ESRD on HD, PAD, COPD.  Labs reviewed.  NKFA.  DNR status.  RD to continue to monitor EN support tolerance at follow up visits and adjust recommendations  "accordingly.    Nutrition/Diet History    Nutrition Related Social Determinants of Health: SDOH: Unable to assess at this time.     Spiritual, Cultural Beliefs, Yazidism Practices, Values that Affect Care:  (DNR)  Food Allergies: NKFA  Factors Affecting Nutritional Intake: on mechanical ventilation, NPO    Anthropometrics    Height: 5' 10" (177.8 cm)  Height (inches): 70 in  Height Method: Stated  Weight: 81.6 kg (179 lb 14.3 oz)  Weight (lb): 179.9 lb  Weight Method: Standard Scale  Ideal Body Weight (IBW), Male: 166 lb  % Ideal Body Weight, Male (lb): 108.37 %  BMI (Calculated): 25.8  BMI Grade: 25 - 29.9 - overweight     Wt Readings from Last 10 Encounters:   06/11/25 81.6 kg (179 lb 14.3 oz)   06/04/25 81.3 kg (179 lb 3.7 oz)   06/03/25 81.6 kg (180 lb)   06/03/25 81.9 kg (180 lb 8.9 oz)   05/27/25 81.9 kg (180 lb 8.9 oz)   04/30/25 82.6 kg (182 lb)   01/28/25 78.8 kg (173 lb 11.6 oz)   01/24/25 72.5 kg (159 lb 13.3 oz)   12/16/24 81.2 kg (179 lb 0.2 oz)   11/12/24 78.9 kg (173 lb 15.1 oz)       Lab/Procedures/Meds    Pertinent Labs Reviewed: reviewed  BMP  Lab Results   Component Value Date     (L) 06/11/2025    K 3.6 06/11/2025    CL 85 (L) 06/11/2025    CO2 25 06/11/2025     (H) 06/11/2025    CREATININE 9.7 (H) 06/11/2025    CALCIUM 7.4 (L) 06/11/2025    ANIONGAP 21 (H) 06/11/2025    EGFRNORACEVR 6 (L) 06/11/2025     Lab Results   Component Value Date    HGBA1C 5.2 06/04/2025     Lab Results   Component Value Date    CALCIUM 7.4 (L) 06/11/2025    PHOS 8.4 (H) 06/11/2025       Pertinent Medications Reviewed: reviewed  Scheduled Meds:   amiodarone  400 mg Per OG tube BID    Followed by    [START ON 6/21/2025] amiodarone  200 mg Per OG tube Daily    aspirin  81 mg Per OG tube Daily    atorvastatin  40 mg Per OG tube QHS    clopidogreL  75 mg Per OG tube Daily    heparin (porcine)  5,000 Units Subcutaneous Q8H    levothyroxine  75 mcg Per OG tube Before breakfast    pantoprazole  40 mg Intravenous " Daily    perflutren protein-a microsphr  0.5 mL Intravenous Once    sevelamer carbonate  1.6 g Per OG tube TID     Continuous Infusions:   NORepinephrine bitartrate-D5W  0-3 mcg/kg/min Intravenous Continuous 18.2 mL/hr at 06/10/25 0900 0.06 mcg/kg/min at 06/10/25 0900    propofoL  0-50 mcg/kg/min Intravenous Continuous   Stopped at 06/11/25 0831     PRN Meds:.  Current Facility-Administered Medications:     dextrose 50%, 12.5 g, Intravenous, PRN    dextrose 50%, 25 g, Intravenous, PRN    glucagon (human recombinant), 1 mg, Intramuscular, PRN    glucose, 16 g, Oral, PRN    glucose, 24 g, Oral, PRN    hydrOXYzine pamoate, 25 mg, Oral, Nightly PRN    naloxone, 0.02 mg, Intravenous, PRN    sodium chloride 0.9%, 10 mL, Intravenous, Q12H PRN    Estimated/Assessed Needs    Weight Used For Calorie Calculations: 81.6 kg (179 lb 14.3 oz)  Energy Calorie Requirements (kcal): 25-35kcal/kg (2040-2856kcals/day)  Energy Need Method: Kcal/kg  Protein Requirements: 1.2-1.5g/kg (98-114g/day)  Weight Used For Protein Calculations: 81.6 kg (179 lb 14.3 oz)  Fluid Requirements (mL): 1500+output or per md order     RDA Method (mL): 25  CHO Requirement: 250      Nutrition Prescription Ordered    Current Diet Order: NPO  Current Nutrition Support Formula Ordered: Novasource Renal    Evaluation of Received Nutrient/Fluid Intake    Other Calories (kcal): 27 (kcals, 10 mg/mL infusion)  % Kcal Needs: NPO  % Protein Needs: NPO  I/O: 6/11/25: +2757mL since admit  Energy Calories Required: not meeting needs  Protein Required: not meeting needs  Fluid Required: not meeting needs  Comments: LBM: 6/6/25  Tolerance: other (see comments) (NPO, starting EN support)  % Intake of Estimated Energy Needs: 0 - 25 %  % Meal Intake: NPO    PES Statement  Increased nutrient needs related to Chronic illness as evidenced by NPO/CL status due to medical condition, Inability to advance PO diet  Status: New    Nutrition Risk    Level of Risk/Frequency of Follow-up:  moderate - high (Follow up: 2x per week)       Monitor and Evaluation    Monitor and Evaluation: Energy intake, Protein intake, Diet order, Enteral and parenteral nutrition administration, Weight, Beliefs and attitudes, Electrolyte and renal panel, Gastrointestinal profile, Glucose/endocrine profile, Inflammatory profile, Lipid profile, Nutrition focused physical findings       Nutrition Follow-Up    RD Follow-up?: Yes  Gauri Vega MS, RDN, LDN

## 2025-06-11 NOTE — ASSESSMENT & PLAN NOTE
Patient has long standing persistent (>12 months) atrial fibrillation. Patient is currently in sinus rhythm. [id number] Score: 1. The patients heart rate in the last 24 hours is as follows:  Pulse  Min: 66  Max: 85     Antiarrhythmics  amiodarone tablet 400 mg, 2 times daily, Per OG tube  amiodarone tablet 200 mg, Daily, Per OG tube    Anticoagulants  heparin 25,000 units in dextrose 5% 250 mL (100 units/mL) infusion LOW INTENSITY nomogram - [facility], Continuous, Intravenous  heparin 25,000 units in dextrose 5% (100 units/ml) IV bolus from bag LOW INTENSITY nomogram - [facility], As needed (PRN), Intravenous  heparin 25,000 units in dextrose 5% (100 units/ml) IV bolus from bag LOW INTENSITY nomogram - [facility], As needed (PRN), Intravenous    Plan  - Replete lytes with a goal of K>4, Mg >2  - Patient is anticoagulated, see medications listed above.  - Patient's afib is currently controlled

## 2025-06-11 NOTE — PLAN OF CARE
CM rounded on pt   He is intubated and unable to respond to questions   CM to continue to monitor pt status to detemine d/c needs   wife Karmen [phone]   s/p Cardiac Arrest   ESRD pt on HD - pt drove himself to ED due to CP experienced at dialysis.   Per MD notes - pt following commands today.    06/11/25 1800   Post-Acute Status   Post-Acute Authorization   (dc needs to be determined)   Discharge Delays   (pending medical stability)   Discharge Plan   Discharge Plan A   (to be determined)

## 2025-06-11 NOTE — ASSESSMENT & PLAN NOTE
- currently intubated, following commands  - plan to extubate today, will monitor mentation after extubated  - delirium precautions

## 2025-06-11 NOTE — PROGRESS NOTES
06/11/25 1710   Pre-Hemodialysis Assessment   Treatment Status Completed   During Hemodialysis Assessment   Blood Flow Rate (mL/min) 300 mL/min   Dialysate Flow Rate (mL/min) 600 ml/min   Ultrafiltration Rate (mL/Hr) 500 mL/Hr   Blood Volume Processed (Liters) 1500 L   Intra-Hemodialysis Comments tx complete   Post-Hemodialysis Assessment   Rinseback Volume (mL) 250 mL   Blood Volume Processed (Liters) 54 L   Dialyzer Clearance Lightly streaked   Duration of Treatment 180 minutes   Additional Fluid Intake (mL) 500 mL   Total UF (mL) 1500 mL   Net Fluid Removal 1000   Patient Response to Treatment pt alex tx   Post-Hemodialysis Comments lines flushed blood returned

## 2025-06-11 NOTE — PLAN OF CARE
Nutrition Plan of Care:    Recommendations     1. Enteral Nutrition Management:   Tube feeding recommendations of Novasource Renal with goal rate of 40ml/hr.     -start TF at rate of 10ml/hr, increase by 10mls q 8 hours until goal rate of 40ml/hr is reached as tolerated.      TF to provide: 1920kcals, 87gPRO, 176gCHO, 688ml h2o     Flush with 60mls q 6 hours or per md order          2. Monitor labs and propofol kcals     3. Collaboration by nutrition professional with other providers     Goals: Patient to receive/consume >75% of EEN prior to RD follow up  Nutrition Goal Status: new  Communication of RD Recs: other (comment) (Consult, poc)     Nutrition Discharge Planning      Nutrition Discharge Planning: Too early to determine, pending clinical course     Assessment and Plan     Nutrition Problem  Increased nutrient needs     Related to (etiology):   ESRD   Decreased ability to consume sufficient protein and energy      Signs and Symptoms (as evidenced by):   Hemodialysis   Intubation  NPO     Interventions (treatment strategy):  Enteral Nutrition Management   Collaboration by nutrition professional with other providers     Nutrition Diagnosis Status:   Maximus Vega MS, RDN, LDN

## 2025-06-11 NOTE — RESPIRATORY THERAPY
Extubated Patient per MD order to continuous Bipap with documented settings.  Will wean as tolerated.  Will continue to monitor.

## 2025-06-11 NOTE — ASSESSMENT & PLAN NOTE
Patient's blood pressure range in the last 24 hours was: BP  Min: 88/42  Max: 151/67.The patient's inpatient anti-hypertensive regimen is listed below:  Current Antihypertensives       Plan  - BP is controlled, no changes needed to their regimen  - holding antihypertensives in setting of arrest/shock

## 2025-06-11 NOTE — EICU
eICU Physician Virtual/Remote Brief Evaluation Note      Message from bedside RN   Potassium 3.6  Resuscitation with creatinine 9.3   Chart reviewed  /56 (75), P 77, RR 28, O2 sat 100, end-tidal 25  Normal sinus rhythm  Magnesium 2.5  Will defer potassium management to Nephrology      B. Dmitriy Be MD  eICU Attending  [phone]    This report has been created through the use of M-Modal dictation software. Typographical and content errors may occur with this process. While efforts are made to detect and correct such errors, in some cases errors will persist. For this reason, wording in this document should be considered in the proper context and not strictly verbatim

## 2025-06-11 NOTE — PLAN OF CARE
Pt received as charted on vent flowsheet. Ambu bag and mask at bedside. All alarms on and functional with adequate volume. Cuff pressure monitored via MLT/Vicente-[facility]uff [facility]. ETT size #7.0. Pt with no apprent respiratory distress noted. Will continue to monitor.

## 2025-06-11 NOTE — EICU
Intervention Initiated From:  Bedside    Margie intervened regarding:  Labs and Medication    Nurse Notified:  Yes    Doctor Notified:  Yes    Comments: k-3.6 and bedside nurse would like to see if sergio sewell wants to replace. Informed eicu md,  of above and that pt is a HD pt with cr of 9.3.

## 2025-06-11 NOTE — PLAN OF CARE
Problem: Adult Inpatient Plan of Care  Goal: Plan of Care Review  Outcome: Progressing  Goal: Patient-Specific Goal (Individualized)  Outcome: Progressing  Goal: Absence of Hospital-Acquired Illness or Injury  Outcome: Progressing  Goal: Optimal Comfort and Wellbeing  Outcome: Progressing  Goal: Readiness for Transition of Care  Outcome: Progressing     Problem: Wound  Goal: Optimal Coping  Outcome: Progressing  Goal: Optimal Functional Ability  Outcome: Progressing  Goal: Absence of Infection Signs and Symptoms  Outcome: Progressing  Goal: Improved Oral Intake  Outcome: Progressing  Goal: Optimal Pain Control and Function  Outcome: Progressing  Goal: Skin Health and Integrity  Outcome: Progressing  Goal: Optimal Wound Healing  Outcome: Progressing     Problem: Fall Injury Risk  Goal: Absence of Fall and Fall-Related Injury  Outcome: Progressing     Problem: Comorbidity Management  Goal: Blood Pressure in Desired Range  Outcome: Progressing     Problem: Hemodialysis  Goal: Safe, Effective Therapy Delivery  Outcome: Progressing  Goal: Effective Tissue Perfusion  Outcome: Progressing  Goal: Absence of Infection Signs and Symptoms  Outcome: Progressing     Problem: Infection  Goal: Absence of Infection Signs and Symptoms  Outcome: Progressing     Problem: Mechanical Ventilation Invasive  Goal: Effective Communication  Outcome: Progressing  Goal: Optimal Device Function  Outcome: Progressing  Goal: Mechanical Ventilation Liberation  Outcome: Progressing  Goal: Optimal Nutrition Delivery  Outcome: Progressing  Goal: Absence of Device-Related Skin and Tissue Injury  Outcome: Progressing  Goal: Absence of Ventilator-Induced Lung Injury  Outcome: Progressing     Problem: Restraint, Nonviolent  Goal: Absence of Harm or Injury  Outcome: Progressing

## 2025-06-11 NOTE — CLINICAL REVIEW
IP Sepsis Screen (most recent)       Sepsis Screen (IP) - 06/11/25 8871       Is the patient's history or complaint suggestive of a possible infection? Yes  -WL    Are there at least two of the following signs and symptoms present? Yes  -WL    Sepsis signs/symptoms - Tachypnea Tachypnea     >20  -WL    Sepsis signs/symptoms - Altered Mental Status Altered Mental Status  -WL    Are any of the following organ dysfunction criteria present and not considered to be due to a chronic condition? Yes  -WL    Organ Dysfunction Criteria - Resp Comp Respiratory Compromise: Requiring > 5L NC  -WL    Initiate Sepsis Protocol No  -WL    Reason sepsis not considered Pt. receiving appropriate management  -WL              User Key  (r) = Recorded By, (t) = Taken By, (c) = Cosigned By      Initials Name    Elizabeth Wilson RN

## 2025-06-11 NOTE — PROGRESS NOTES
[facility] Medicine  Progress Note    Patient Name: Domingo Gross  MRN: [id number]  Patient Class: IP- Inpatient   Admission Date: 6/7/2025  Length of Stay: 4 days  Attending Physician: Pawan Garcia,*  Primary Care Provider: Geeta Coffey MD        Subjective     Principal Problem:Cardiac arrest        HPI:  64 y.o. male with PMH ESRD on HD, PAD with multiple interventions, CAD (mid LAD/prox RCA PCI 3/2019 and prox LCA in 10/2019 following out of hospital cardiac arrest), after cardiac arrest in setting of prox LCX occlusion treated with PCI, 9/16/2024 s/p intervention w cutting/shockwave PTCA to Lcx, 01/24/25 PCI of OM, new reduction in EF presents to ER with substernal chest pain that radiates to his throat. Report his chest pain feels like heaviness as if something is heavy sitting on his chest, started last night at rest, he took sublingual nitro which only slightly helped but he was still feeling it, today as he was having HD the pain got worse so he came to the ED.    Patient denies smoking or alcohol use        Overview/Hospital Course:  No notes on file    Interval History: improving daily; off pressors and sedation, following commands and tracking. Anticipate extubation this afternoon.    Review of Systems  Objective:     Vital Signs (Most Recent):  Temp: 96.8 °F (36 °C) (06/11/25 1230)  Pulse: 76 (06/11/25 1230)  Resp: (!) 28 (06/11/25 1230)  BP: (!) 105/53 (06/11/25 1219)  SpO2: 100 % (06/11/25 1230) Vital Signs (24h Range):  Temp:  [91.8 °F (33.2 °C)-98.8 °F (37.1 °C)] 96.8 °F (36 °C)  Pulse:  [66-85] 76  Resp:  [27-29] 28  SpO2:  [95 %-100 %] 100 %  BP: ()/(42-67) 105/53     Weight: 81.6 kg (180 lb)  Body mass index is 25.83 kg/m².    Intake/Output Summary (Last 24 hours) at 6/11/2025 1237  Last data filed at 6/11/2025 1200  Gross per 24 hour   Intake 748.58 ml   Output 665 ml   Net 83.58 ml         Physical Exam  Constitutional:       Appearance: He is  ill-appearing.   HENT:      Head: Normocephalic.   Cardiovascular:      Rate and Rhythm: Normal rate.   Pulmonary:      Comments: Intubated mechanically ventilated  Abdominal:      Palpations: Abdomen is soft.   Skin:     General: Skin is warm.   Neurological:      Comments: Off sedation, following commands, tracking               Significant Labs: All pertinent labs within the past 24 hours have been reviewed.    Significant Imaging: I have reviewed all pertinent imaging results/findings within the past 24 hours.      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    - currently intubated, following commands  - plan to extubate today, will monitor mentation after extubated  - delirium precautions    NSTEMI (non-ST elevated myocardial infarction)  History of MI (myocardial infarction)    Patient with chest pain worsened while on HD  Trops are flat 0.14  EKG with no ischemic changes  Echo with worsening wall motion as per cardio  Started on heparin drip  Seen by cardiology, plan was for Left heart cath 06/09 but patient had a cardiac arrest that morning; managing medically for now  - continue ASA/plavix/heparin gtt/statin  - Cardiology following    Cardiogenic shock  This patient has shock. The type of shock is cardiogenic due to ischemic. The patient has the following evidence of shock: persistent hypotension and BRETT. The patient will be admitted to an intensive care unit, they will be treated with levophed; now de-escalated.  - cardiogenic shock has now resolved    HFrEF (heart failure with reduced ejection fraction)    - likely ischemic  - volume control with dialysis    Repeat echo showed   Left  Ventricle: The left ventricle is moderately dilated. Mildly increased wall thickness. There is eccentric hypertrophy. Regional wall motion abnormalities present. See diagram for wall motion findings. There is moderately reduced systolic function with a visually estimated ejection fraction of 35 - 40%. Quantitated ejection fraction is 38%. Unable to assess diastolic function due to poor image quality.    Right Ventricle: The right ventricle is normal in size Systolic function is normal. TAPSE is 2.3 cm.    Overall the study quality was technically difficult.  Paroxysmal atrial fibrillation  Patient has long standing persistent (>12 months) atrial fibrillation. Patient is currently in sinus rhythm. [id number] Score: 1. The patients heart rate in the last 24 hours is as follows:  Pulse  Min: 66  Max: 85     Antiarrhythmics  amiodarone tablet 400 mg, 2 times daily, Per OG tube  amiodarone tablet 200 mg, Daily, Per OG tube    Anticoagulants  heparin 25,000 units in dextrose 5% 250 mL (100 units/mL) infusion LOW INTENSITY nomogram - [facility], Continuous, Intravenous  heparin 25,000 units in dextrose 5% (100 units/ml) IV bolus from bag LOW INTENSITY nomogram - [facility], As needed (PRN), Intravenous  heparin 25,000 units in dextrose 5% (100 units/ml) IV bolus from bag LOW INTENSITY nomogram - [facility], As needed (PRN), Intravenous    Plan  - Replete lytes with a goal of K>4, Mg >2  - Patient is anticoagulated, see medications listed above.  - Patient's afib is currently controlled    Acute respiratory failure with hypoxia  Patient with Hypoxic Respiratory failure which is Acute.  he is not on home oxygen. Supplemental oxygen was provided and noted- Vent Mode: A/CMV-VC  Oxygen Concentration (%):  [30] 30  Resp Rate Total:  [28 br/min-30 br/min] 28 br/min  Vt Set:  [480 mL] 480 mL  PEEP/CPAP:  [5 cmH20] 5 cmH20  Mean Airway Pressure:  [12.1 pbD11-83.9 cmH20] 13.1 cmH20    .   Signs/symptoms of respiratory failure include- lethargy.  Contributing diagnoses includes - CHF Labs and images were reviewed. Patient Has recent ABG, which has been reviewed. Will treat underlying causes and adjust management of respiratory failure as follows- on mechanical ventilation; plan to extubate today pending progression    ESRD (end stage renal disease)   >>ASSESSMENT AND PLAN FOR ESRD (END STAGE RENAL DISEASE) WRITTEN ON 6/7/2025  5:21 PM BY ERICH FRAZIER MD    Creatine stable for now. BMP reviewed- noted Estimated Creatinine Clearance: 8.2 mL/min (A) (based on SCr of 9.5 mg/dL (H)). according to latest data. Based on current GFR, CKD stage is end stage.  Monitor UOP and serial BMP and adjust therapy as needed. Renally dose meds. Avoid nephrotoxic medications and procedures.    ESRD On HD,  Primary hypertension  Patient's blood pressure range in the last 24 hours was: BP  Min: 88/42  Max: 151/67.The patient's inpatient anti-hypertensive regimen is listed below:  Current Antihypertensives       Plan  - BP is controlled, no changes needed to their regimen  - holding antihypertensives in setting of arrest/shock    Hyperlipidemia    Continue statin  Anemia due to chronic kidney disease, on chronic dialysis  Anemia is likely due to chronic disease due to ESRD. Most recent hemoglobin and hematocrit are listed below.  Recent Labs     06/09/25  0328 06/09/25  1026 06/10/25  0416 06/11/25  0427   HGB 9.8*  --  8.1* 7.4*   HCT 29.8* 30.8* 22.3* 20.8*     Plan  - Monitor serial CBC: Daily  - Transfuse PRBC if patient becomes hemodynamically unstable, symptomatic or H/H drops below 7/21.  - Patient has not received any PRBC transfusions to date  - Patient's anemia is currently stable    Hypothyroidism  Cont levothyroxine     VTE Risk Mitigation (From admission, onward)           Ordered     heparin 25,000 units in dextrose 5% (100 units/ml) IV bolus from bag LOW INTENSITY nomogram - [facility]  As needed (PRN)        Question:  Heparin Infusion Adjustment (DO NOT MODIFY  ANSWER)  Answer:  [email or web address]\epic\Images\Pharmacy\HeparinInfusions\heparin LOW INTENSITY nomogram for [facility] [id number]    06/07/25 1147     heparin 25,000 units in dextrose 5% (100 units/ml) IV bolus from bag LOW INTENSITY nomogram - [facility]  As needed (PRN)        Question:  Heparin Infusion Adjustment (DO NOT MODIFY ANSWER)  Answer:  \\[email or web address]\epic\Images\Pharmacy\HeparinInfusions\heparin LOW INTENSITY nomogram for [facility] [id number].pdf    06/07/25 1147     heparin 25,000 units in dextrose 5% 250 mL (100 units/mL) infusion LOW INTENSITY nomogram - [facility]  Continuous        Question:  Begin at (units/kg/hr)  Answer:  12    06/07/25 1147     IP VTE HIGH RISK PATIENT  Once         06/07/25 1155     Place sequential compression device  Until discontinued         06/07/25 1155                    Discharge Planning   FLACO:      Code Status: DNR   Medical Readiness for Discharge Date:                Critical care time spent on the evaluation and treatment of severe organ dysfunction, review of pertinent labs and imaging studies, discussions with consulting providers and discussions with patient/family: 40 minutes.            Pawan Garcia MD  Department of Hospital Medicine   [facility]

## 2025-06-11 NOTE — SUBJECTIVE & OBJECTIVE
Interval History: improving daily; off pressors and sedation, following commands and tracking. Anticipate extubation this afternoon.    Review of Systems  Objective:     Vital Signs (Most Recent):  Temp: 96.8 °F (36 °C) (06/11/25 1230)  Pulse: 76 (06/11/25 1230)  Resp: (!) 28 (06/11/25 1230)  BP: (!) 105/53 (06/11/25 1219)  SpO2: 100 % (06/11/25 1230) Vital Signs (24h Range):  Temp:  [91.8 °F (33.2 °C)-98.8 °F (37.1 °C)] 96.8 °F (36 °C)  Pulse:  [66-85] 76  Resp:  [27-29] 28  SpO2:  [95 %-100 %] 100 %  BP: ()/(42-67) 105/53     Weight: 81.6 kg (180 lb)  Body mass index is 25.83 kg/m².    Intake/Output Summary (Last 24 hours) at 6/11/2025 1237  Last data filed at 6/11/2025 1200  Gross per 24 hour   Intake 748.58 ml   Output 665 ml   Net 83.58 ml         Physical Exam  Constitutional:       Appearance: He is ill-appearing.   HENT:      Head: Normocephalic.   Cardiovascular:      Rate and Rhythm: Normal rate.   Pulmonary:      Comments: Intubated mechanically ventilated  Abdominal:      Palpations: Abdomen is soft.   Skin:     General: Skin is warm.   Neurological:      Comments: Off sedation, following commands, tracking               Significant Labs: All pertinent labs within the past 24 hours have been reviewed.    Significant Imaging: I have reviewed all pertinent imaging results/findings within the past 24 hours.

## 2025-06-11 NOTE — EICU
Virtual ICU Quality Rounds    Admit Date: 6/7/2025  Hospital Day: 4    ICU Day: 2d 3h    24H Vital Sign Range:  Temp:  [92.8 °F (33.8 °C)-98.8 °F (37.1 °C)]   Pulse:  [59-85]   Resp:  [16-28]   BP: ()/(42-67)   SpO2:  [95 %-100 %]     VICU Surveillance Screening

## 2025-06-11 NOTE — PROGRESS NOTES
[facility] Progress Note     Attending Physician: Nima Ervin MD  Cardiology Attending Physician: Enoch Bray MD  Date of Admit: 2025      Subjective/Interval History:      Patient seen this afternoon with his wife at bedside. Propofol off for a few hours. I called his name and he attempted to track with his eyes. HDS.     Objective:     Last 24 Hour Vital Signs:  BP  Min: 99/49  Max: 153/67  Temp  Av.3 °F (35.2 °C)  Min: 91.9 °F (33.3 °C)  Max: 98.8 °F (37.1 °C)  Pulse  Av.3  Min: 52  Max: 85  Resp  Av.9  Min: 16  Max: 28  SpO2  Av.6 %  Min: 95 %  Max: 100 %  I/O last 3 completed shifts:  In: 2395.6 [I.V.:1981.2; Other:145; IV Piggyback:269.3]  Out: 195 [Urine:195]    Physical Examination:None    Laboratory/Radiographic/Cardiac Data:  Personally Reviewed      Assessment/Plan:     Patient Active Problem List    Diagnosis Date Noted    Acute hypoxemic respiratory failure 2025    NSTEMI (non-ST elevated myocardial infarction) 2025    Thrombocytopenia, unspecified 2025    Hypokalemia 2025    Elevated troponin 2025    Insomnia 2024    Hyperparathyroidism 2024    S/P drug eluting coronary stent placement 2024    HFrEF (heart failure with reduced ejection fraction) 2024    Chronic anticoagulation 2024    History of MI (myocardial infarction) 2024    Hemodialysis catheter dysfunction 2024    S/P arteriovenous (AV) graft placement 2024    Membranous nephropathy determined by biopsy 2024    ESRD (end stage renal disease) 2024    Paroxysmal atrial fibrillation 2023    Hypothyroidism 2023    Anemia due to chronic kidney disease, on chronic dialysis 12/15/2021    COPD (chronic obstructive pulmonary disease) 2021    Nuclear sclerosis, bilateral 2020    Nephrotic range proteinuria 2020    Cardiac arrest 10/04/2019    Bilateral carotid artery stenosis      Atherosclerosis of native coronary artery of native heart with unstable angina pectoris 03/29/2019    Venous insufficiency of both lower extremities 06/04/2018    Atherosclerosis of native artery of both lower extremities with intermittent claudication 03/02/2018    Hyperlipidemia 06/12/2015    DJD (degenerative joint disease), lumbar 05/27/2015    Claudication in peripheral vascular disease 06/13/2014    PAD (peripheral artery disease) 05/21/2014    Primary hypertension 04/29/2013        Cardiac arrest- PEA ARREST multiples ACLS round. qTC >600ms   Multiple cardiac arrest in patient with extensive CAD history  ESRD  NSTEMI   Plan:   -DAPT  -Levophed to keep MAP > 65  -amiodarone gtt transition to PO amio.   -Patient has some cortical function, he has corneal and gag reflex, he opens his eyes and responds to pain  -Limited echo        Enoch Bray M.D.  Interventional Cardiology   [facility]    -Independently reviewing and interpreting (if not documented by myself) EKGs, echocardiograms, catherizations   -Obtaining a history, performing a relevant exam, counseling/educating the patient/family  -Documenting clinical information in the EMR including ordering of tests  -Care coordination and communicating with other health care professionals

## 2025-06-11 NOTE — EICU
Intervention Initiated From:  COR / OR[facility]U    Margie intervened regarding:  Rounding (Video assessment)  VICU Night Rounds Checklist  24H Vital Sign Range:  Temp:  [91.9 °F (33.3 °C)-98.8 °F (37.1 °C)]   Pulse:  [52-85]   Resp:  [16-28]   BP: ()/(49-67)   SpO2:  [95 %-100 %]     Video rounds

## 2025-06-11 NOTE — ASSESSMENT & PLAN NOTE
Patient with Hypoxic Respiratory failure which is Acute.  he is not on home oxygen. Supplemental oxygen was provided and noted- Vent Mode: A/CMV-VC  Oxygen Concentration (%):  [30] 30  Resp Rate Total:  [28 br/min-30 br/min] 28 br/min  Vt Set:  [480 mL] 480 mL  PEEP/CPAP:  [5 cmH20] 5 cmH20  Mean Airway Pressure:  [12.1 ipQ37-46.9 cmH20] 13.1 cmH20    .   Signs/symptoms of respiratory failure include- lethargy. Contributing diagnoses includes - CHF Labs and images were reviewed. Patient Has recent ABG, which has been reviewed. Will treat underlying causes and adjust management of respiratory failure as follows- on mechanical ventilation; plan to extubate today pending progression

## 2025-06-11 NOTE — ASSESSMENT & PLAN NOTE
Anemia is likely due to chronic disease due to ESRD. Most recent hemoglobin and hematocrit are listed below.  Recent Labs     06/09/25  0328 06/09/25  1026 06/10/25  0416 06/11/25  0427   HGB 9.8*  --  8.1* 7.4*   HCT 29.8* 30.8* 22.3* 20.8*     Plan  - Monitor serial CBC: Daily  - Transfuse PRBC if patient becomes hemodynamically unstable, symptomatic or H/H drops below 7/21.  - Patient has not received any PRBC transfusions to date  - Patient's anemia is currently stable

## 2025-06-11 NOTE — ASSESSMENT & PLAN NOTE
Patient with chest pain worsened while on HD  Trops are flat 0.14  EKG with no ischemic changes  Echo with worsening wall motion as per cardio  Started on heparin drip  Seen by cardiology, plan was for Left heart cath 06/09 but patient had a cardiac arrest that morning; managing medically for now  - continue ASA/plavix/heparin gtt/statin  - Cardiology following

## 2025-06-11 NOTE — PROGRESS NOTES
Progress Note  [facility]    Attending: Dr. Do  Fellow: Dr. Anderson  Admit Date: 6/7/2025  Today's Date: 06/11/2025    ASSESSMENT & RECOMMENDATIONS       CNS/Neuro:    Acute encephalopathy, improving   Enlarged superior ophthalmic veins bilaterally  C/f anoxic-ischemic brain injury in the background of multiple arrests  - TTM complete  - daily SAT  - on propofol  - neuro prognostication  - following commands today     Cardiovascular:    Cardiac Arrest x 3  Significant CAD with previous MI and cardiac arrest in 2019  Shock, liekly cardiogenic   V-tach  Prolonged QTC  - bedside Us with global dysfunction  -  continue amio orally now   - TTM complete  - , Mag 2.5  - Levo for Map > 65  - cardiology on board    Respiratory:    Acute hypoxemic respiratory failure  - BL interstitial infiltrates on CXR  - pulmonary edema suspected in the background of 3 cardiac arrests and global cardiac dysfunction   - lung protective ventilation: peak airway < 40, Pplat < 30  - ABGs  - wean FiO2 as tolerated  - SAT/SBT    GI/Metabolic: no acute concerns at this time     Renal:    ESRD  - nephrology following   - may need UF soon    Hopokalemia  - replacing prn for goal K> 4, patient is sensitive to K  - 50 meq today via OGT plus 2 K-rider  - q4h BMP and mag   - cortisol wnl  - obtaining renin/clayton level     Heme:    Normocytic anemia  - likely in the background of ESRD  - transfuse to > 7  - checking anemia labs     Endo:   Glucose within goal    Subclinical hypothyroidism  - TSH high, t4 wnl  - suspect in the setting of critical illness  - CTM        ID: no acute concerns       SUBJECTIVE:     Reflexes improving yesterday, this morning awake following commands, getting dialysis will trial SBT this afternoon    OBJECTIVE:     Vital Signs Trends/Hx Reviewed  Vitals:    06/11/25 0545 06/11/25 0600 06/11/25 0615 06/11/25 0630   BP: (!) 104/53 (!) 102/55 (!) 106/52 (!) 105/51   BP Location: Right  leg      Patient Position: Lying      Pulse: 80 79 79 79   Resp: (!) 28 (!) 28 (!) 28 (!) 28   Temp: 96.6 °F (35.9 °C) 96.8 °F (36 °C) 97 °F (36.1 °C) 97 °F (36.1 °C)   TempSrc: Core Esophageal   Core Esophageal   SpO2: 100% 100% 100% 100%   Weight:       Height:           Ventilator settings:   Vent Type: NKV-550 (6/11/2025  5:23 AM)    Vent Mode: A/CMV-VC  Oxygen Concentration (%):  [] 30  Resp Rate Total:  [28 br/min-30 br/min] 28 br/min  Vt Set:  [480 mL] 480 mL  PEEP/CPAP:  [5 cmH20] 5 cmH20  Mean Airway Pressure:  [12.1 uaU16-32 cmH20] 12.1 cmH20        Physical Exam:  Physical Exam  Constitutional:       General: He is not in acute distress.     Appearance: He is ill-appearing.   HENT:      Head: Normocephalic and atraumatic.   Cardiovascular:      Rate and Rhythm: Normal rate and regular rhythm.   Pulmonary:      Comments: Mechanically ventilated  Abdominal:      General: Abdomen is flat. Bowel sounds are normal.      Palpations: Abdomen is soft.   Skin:     General: Skin is dry.   Neurological:      Comments: Cough corneal and pupil reflex intact. Following commands bilaterally          Labs: reviewed    Micro: reviewed    Imaging reviewed    MAR: reviewed      Feeding/fluids: starting TF  Analgesia: N/A  Sedation: Propofol, held   Thromboprophylaxis: SQH  Head up position: Yes  Ulcer prophylaxis: PPI  Glycemic control: Yes  Spontaneous breathing trial: daily   Bowel care: Yes  Indwelling catheter removal: Peripheral line, right central IJV, ETT, OGT, HD AV fistula left upper arm  Deescalation of antibiotics: N/A     Code status: DNR    Disposition: remain in ICU    Thank you for allowing us to participate in the care of this patient. Please call with questions.    Cinthya Munoz MD  [facility], PGY-1    Pt seen and examined with Pulmonary/Critical Care team and this note was reviewed and validated with the following additional comments: Neurologically improved. Passed SAT/SBT.  Follows  commands. Does have a flail chest segment but no respiratory distress.  Oxygenation OK. Hemodynamics markedly improved. Extubated to [facility] doing well.    Critical Care time was spent validating the history and physical exam, reviewing the lab and imaging results, and discussing the care of the patient with the bedside nurse and the patient and/or surrogates. This critical care time did not overlap with that of any other provider or involve time for any procedures.  This patient has a high probability of sudden clinically significant deterioration which requires the highest level of physician preparedness to intervene urgently. I managed/supervised life or organ supporting interventions that required frequent physician assessments. I devoted my full attention in the ICU to the direct care of this patient for this period of time. Organ systems which are failing and require intensive, critical care support are: cardiovascular, respiratory, renal, neurologic  Critical Care time: 55 minutes    Mickey Doe MD  Phone [phone]

## 2025-06-11 NOTE — ASSESSMENT & PLAN NOTE
Patient had cardiac arrest requiring ACLS x3 on 6/9  Currently intubated and mechanically ventilated  Hypothermia protocol initiated, s/p rewarming  Remarkable improvement, following commands and tracking; off sedation  - plan for extubation today  - Pulmonology and Cardiology following

## 2025-06-11 NOTE — ASSESSMENT & PLAN NOTE
- likely ischemic  - volume control with dialysis    Repeat echo showed   Left Ventricle: The left ventricle is moderately dilated. Mildly increased wall thickness. There is eccentric hypertrophy. Regional wall motion abnormalities present. See diagram for wall motion findings. There is moderately reduced systolic function with a visually estimated ejection fraction of 35 - 40%. Quantitated ejection fraction is 38%. Unable to assess diastolic function due to poor image quality.    Right Ventricle: The right ventricle is normal in size Systolic function is normal. TAPSE is 2.3 cm.    Overall the study quality was technically difficult.

## 2025-06-11 NOTE — ASSESSMENT & PLAN NOTE
This patient has shock. The type of shock is cardiogenic due to ischemic. The patient has the following evidence of shock: persistent hypotension and BRETT. The patient will be admitted to an intensive care unit, they will be treated with levophed; now de-escalated.  - cardiogenic shock has now resolved

## 2025-06-12 PROBLEM — I48.0 PAF (PAROXYSMAL ATRIAL FIBRILLATION): Status: ACTIVE | Noted: 2025-01-01

## 2025-06-12 PROBLEM — R94.31 PROLONGED Q-T INTERVAL ON ECG: Status: ACTIVE | Noted: 2025-06-12

## 2025-06-12 NOTE — ASSESSMENT & PLAN NOTE
- presented with chest pain; initial troponin 0.1-0.3 with trend up to 1.9 after cardiac arrest  - history of CAD; originally planned for LHC but deferred given cardiac arrest; on ASA and Plasix as well as statin; previously on IV Heparin but held; no ARB or BB due to marginal BP  - will plan to continued GDMT for NSTEMI; no plans for LHC as of now unless acute changes noted on EKG; complaints of chest pain today worse with inspiration or movement likely related to rib fractures from CPR; EKG with no KALIE

## 2025-06-12 NOTE — SUBJECTIVE & OBJECTIVE
Interval History:     Undergoing dialysis today.  Plan for 1 L to be removed.  Started on hydralazine 25 mg b.i.d. for blood pressure control.  Reports chest pain, likely from CPR, started on pain medications    Review of Systems   Unable to perform ROS: Mental status change     Objective:     Vital Signs (Most Recent):  Temp: 97.5 °F (36.4 °C) (06/12/25 1710)  Pulse: 96 (06/12/25 1727)  Resp: (!) 22 (06/12/25 1727)  BP: (!) 150/70 (06/12/25 1727)  SpO2: 97 % (06/12/25 1727) Vital Signs (24h Range):  Temp:  [97.5 °F (36.4 °C)-98.5 °F (36.9 °C)] 97.5 °F (36.4 °C)  Pulse:  [74-96] 96  Resp:  [15-38] 22  SpO2:  [92 %-100 %] 97 %  BP: ()/(50-93) 150/70     Weight: 81.6 kg (179 lb 14.3 oz)  Body mass index is 25.81 kg/m².    Intake/Output Summary (Last 24 hours) at 6/12/2025 1752  Last data filed at 6/12/2025 1430  Gross per 24 hour   Intake --   Output 1920 ml   Net -1920 ml         Physical Exam  Constitutional:       General: He is not in acute distress.  HENT:      Head: Normocephalic.      Right Ear: There is no impacted cerumen.      Left Ear: There is no impacted cerumen.      Mouth/Throat:      Pharynx: No oropharyngeal exudate or posterior oropharyngeal erythema.   Eyes:      General:         Right eye: No discharge.         Left eye: No discharge.   Cardiovascular:      Heart sounds: No murmur heard.  Pulmonary:      Effort: No respiratory distress.      Comments: Bipap machine on   Musculoskeletal:         General: No deformity.      Right lower leg: No edema.   Skin:     Coloration: Skin is not jaundiced.      Findings: No bruising.   Neurological:      Motor: No weakness.      Gait: Gait normal.   Psychiatric:         Mood and Affect: Mood normal.               Significant Labs: All pertinent labs within the past 24 hours have been reviewed.  BMP:   Recent Labs   Lab 06/12/25  0753      *   K 4.5      CO2 21*   BUN 60*   CREATININE 6.1*   CALCIUM 8.2*   MG 2.4     CBC:   Recent Labs    Lab 06/11/25  0427 06/12/25  0440   WBC 7.72 8.90   HGB 7.4* 7.3*   HCT 20.8* 21.8*   * 96*       Significant Imaging: I have reviewed all pertinent imaging results/findings within the past 24 hours.  I have reviewed and interpreted all pertinent imaging results/findings within the past 24 hours.

## 2025-06-12 NOTE — PROGRESS NOTES
[facility] Medicine  Progress Note    Patient Name: Domingo Gross  MRN: [id number]  Patient Class: IP- Inpatient   Admission Date: 6/7/2025  Length of Stay: 5 days  Attending Physician: Juju Varela MD  Primary Care Provider: Geeta Coffey MD        Subjective     Principal Problem:Cardiac arrest        HPI:  64 y.o. male with PMH ESRD on HD, PAD with multiple interventions, CAD (mid LAD/prox RCA PCI 3/2019 and prox LCA in 10/2019 following out of hospital cardiac arrest), after cardiac arrest in setting of prox LCX occlusion treated with PCI, 9/16/2024 s/p intervention w cutting/shockwave PTCA to Lcx, 01/24/25 PCI of OM, new reduction in EF presents to ER with substernal chest pain that radiates to his throat. Report his chest pain feels like heaviness as if something is heavy sitting on his chest, started last night at rest, he took sublingual nitro which only slightly helped but he was still feeling it, today as he was having HD the pain got worse so he came to the ED.    Patient denies smoking or alcohol use        Overview/Hospital Course:  No notes on file    Interval History:     Undergoing dialysis today.  Plan for 1 L to be removed.  Started on hydralazine 25 mg b.i.d. for blood pressure control.  Reports chest pain, likely from CPR, started on pain medications    Review of Systems   Unable to perform ROS: Mental status change     Objective:     Vital Signs (Most Recent):  Temp: 97.5 °F (36.4 °C) (06/12/25 1710)  Pulse: 96 (06/12/25 1727)  Resp: (!) 22 (06/12/25 1727)  BP: (!) 150/70 (06/12/25 1727)  SpO2: 97 % (06/12/25 1727) Vital Signs (24h Range):  Temp:  [97.5 °F (36.4 °C)-98.5 °F (36.9 °C)] 97.5 °F (36.4 °C)  Pulse:  [74-96] 96  Resp:  [15-38] 22  SpO2:  [92 %-100 %] 97 %  BP: ()/(50-93) 150/70     Weight: 81.6 kg (179 lb 14.3 oz)  Body mass index is 25.81 kg/m².    Intake/Output Summary (Last 24 hours) at 6/12/2025 6220  Last data filed at 6/12/2025  1430  Gross per 24 hour   Intake --   Output 1920 ml   Net -1920 ml         Physical Exam  Constitutional:       General: He is not in acute distress.  HENT:      Head: Normocephalic.      Right Ear: There is no impacted cerumen.      Left Ear: There is no impacted cerumen.      Mouth/Throat:      Pharynx: No oropharyngeal exudate or posterior oropharyngeal erythema.   Eyes:      General:         Right eye: No discharge.         Left eye: No discharge.   Cardiovascular:      Heart sounds: No murmur heard.  Pulmonary:      Effort: No respiratory distress.      Comments: Bipap machine on   Musculoskeletal:         General: No deformity.      Right lower leg: No edema.   Skin:     Coloration: Skin is not jaundiced.      Findings: No bruising.   Neurological:      Motor: No weakness.      Gait: Gait normal.   Psychiatric:         Mood and Affect: Mood normal.               Significant Labs: All pertinent labs within the past 24 hours have been reviewed.  BMP:   Recent Labs   Lab 06/12/25  0753      *   K 4.5      CO2 21*   BUN 60*   CREATININE 6.1*   CALCIUM 8.2*   MG 2.4     CBC:   Recent Labs   Lab 06/11/25  0427 06/12/25  0440   WBC 7.72 8.90   HGB 7.4* 7.3*   HCT 20.8* 21.8*   * 96*       Significant Imaging: I have reviewed all pertinent imaging results/findings within the past 24 hours.  I have reviewed and interpreted all pertinent imaging results/findings within the past 24 hours.      Assessment & Plan  Cardiac arrest  Patient had cardiac arrest requiring ACLS x3 on 6/9  Currently intubated and mechanically ventilated  Hypothermia protocol initiated, s/p rewarming  Remarkable improvement, following commands and tracking; off sedation  - extubated on 06/11.  Tolerating well  - Pulmonology and Cardiology following    Anoxic encephalopathy due to cardiac arrest    - currently intubated, following commands--> extubated on 06/11.  Doing well  - delirium precautions    NSTEMI (non-ST  elevated myocardial infarction)  History of MI (myocardial infarction)    Patient with chest pain worsened while on HD  Trops are flat 0.14  EKG with no ischemic changes  Echo with worsening wall motion as per cardio  Seen by cardiology, plan was for Left heart cath 06/09 but patient had a cardiac arrest that morning; managing medically for now  - continue ASA/plavix/heparin gtt/statin  - Cardiology following      Patient with chest pain worsened while on HD  Trops are flat 0.14  EKG with no ischemic changes  Echo with worsening wall motion as per cardio  Started on heparin drip--> holding off at this time.  Just on DVT prophylaxis  Seen by cardiology, plan was for Left heart cath 06/09 but patient had a cardiac arrest that morning; managing medically for now  - continue ASA/plavix/heparin gtt/statin  - Cardiology following    Cardiogenic shock  This patient has shock. The type of shock is cardiogenic due to ischemic. The patient has the following evidence of shock: persistent hypotension and BRETT. The patient will be admitted to an intensive care unit, they will be treated with levophed; now de-escalated.  - cardiogenic shock has now resolved    HFrEF (heart failure with reduced ejection fraction)  - likely ischemic  - volume control with dialysis--> plan for 1L removal on 06/12    Repeat echo showed   Left Ventricle: The left ventricle is moderately dilated. Mildly increased wall thickness. There is eccentric hypertrophy. Regional wall motion abnormalities present. See diagram for wall motion findings. There is moderately reduced systolic function with a visually estimated ejection fraction of 35 - 40%. Quantitated ejection fraction is 38%. Unable to assess diastolic function due to poor image quality.    Right Ventricle: The right ventricle is normal in size Systolic function is normal. TAPSE is 2.3 cm.    Overall the study quality was technically difficult.  Paroxysmal atrial fibrillation  Patient has long  standing persistent (>12 months) atrial fibrillation. Patient is currently in sinus rhythm. [id number] Score: 1. The patients heart rate in the last 24 hours is as follows:  Pulse  Min: 74  Max: 96     Antiarrhythmics  amiodarone tablet 400 mg, 2 times daily, Oral  amiodarone tablet 200 mg, Daily, Oral    Anticoagulants  heparin (porcine) injection 5,000 Units, Every 8 hours, Subcutaneous    Plan  - Replete lytes with a goal of K>4, Mg >2  - Patient is anticoagulated, see medications listed above.  - Patient's afib is currently controlled    Acute respiratory failure with hypoxia  Patient with Hypoxic Respiratory failure which is Acute.  he is not on home oxygen. Supplemental oxygen was provided and noted- Oxygen Concentration (%):  [30] 30    .   Signs/symptoms of respiratory failure include- lethargy. Contributing diagnoses includes - CHF Labs and images were reviewed. Patient Has recent ABG, which has been reviewed. Will treat underlying causes and adjust management of respiratory failure as follows- on mechanical ventilation; plan to extubate today pending progression    ESRD (end stage renal disease)   >>ASSESSMENT AND PLAN FOR ESRD (END STAGE RENAL DISEASE) WRITTEN ON 6/7/2025  5:21 PM BY ERICH FRAZIER MD    Creatine stable for now. BMP reviewed- noted Estimated Creatinine Clearance: 12.8 mL/min (A) (based on SCr of 6.1 mg/dL (H)). according to latest data. Based on current GFR, CKD stage is end stage.  Monitor UOP and serial BMP and adjust therapy as needed. Renally dose meds. Avoid nephrotoxic medications and procedures.    ESRD On HD,  Primary hypertension  Patient's blood pressure range in the last 24 hours was: BP  Min: 99/52  Max: 173/81.The patient's inpatient anti-hypertensive regimen is listed below:  Current Antihypertensives  hydrALAZINE tablet 25 mg, Every 12 hours, Oral    Plan  - BP is controlled, no changes needed to their regimen  - holding antihypertensives in setting of  arrest/shock    Hyperlipidemia    Continue statin  Anemia due to chronic kidney disease, on chronic dialysis  Anemia is likely due to chronic disease due to ESRD. Most recent hemoglobin and hematocrit are listed below.  Recent Labs     06/10/25  0416 06/11/25  0427 06/12/25  0440   HGB 8.1* 7.4* 7.3*   HCT 22.3* 20.8* 21.8*     Plan  - Monitor serial CBC: Daily  - Transfuse PRBC if patient becomes hemodynamically unstable, symptomatic or H/H drops below 7/21.  - Patient has not received any PRBC transfusions to date  - Patient's anemia is currently stable    Hypothyroidism  Cont levothyroxine     Atherosclerosis of native coronary artery of native heart with unstable angina pectoris  Patient with known CAD s/p stent placement, which is controlled Will continue ASA, Plavix, and Statin and monitor for S/Sx of angina/ACS. Continue to monitor on telemetry.   Prolonged Q-T interval on ECG  -continue to monitor    VTE Risk Mitigation (From admission, onward)           Ordered     heparin (porcine) injection 5,000 Units  Every 8 hours         06/11/25 1415     IP VTE HIGH RISK PATIENT  Once         06/07/25 1155     Place sequential compression device  Until discontinued         06/07/25 1155                    Discharge Planning   FLACO:      Code Status: Full Code   Medical Readiness for Discharge Date:   Discharge Plan A:  (to be determined)   Discharge Delays:  (Pending medical stability)        Critical care time spent on the evaluation and treatment of severe organ dysfunction, review of pertinent labs and imaging studies, discussions with consulting providers and discussions with patient/family: 32 minutes.    Please place Justification for DME        Juju Varela MD  Department of Hospital Medicine   [place] - Intensive Care

## 2025-06-12 NOTE — PROGRESS NOTES
06/12/25 1430   Post-Hemodialysis Assessment   Rinseback Volume (mL) 250 mL   Blood Volume Processed (Liters) 62 L   Dialyzer Clearance Lightly streaked   Duration of Treatment 210 minutes   Total UF (mL) 1500 mL   Net Fluid Removal 1000   Patient Response to Treatment alex well   Post-Hemodialysis Comments VSS, NAD noted, lines d/c'd

## 2025-06-12 NOTE — EICU
Virtual ICU Quality Rounds    Admit Date: 6/7/2025  Hospital Day: 5    ICU Day: 3d 7h    24H Vital Sign Range:  Temp:  [95.9 °F (35.5 °C)-98.8 °F (37.1 °C)]   Pulse:  [74-90]   Resp:  [15-42]   BP: ()/(42-93)   SpO2:  [92 %-100 %]     VICU Surveillance Screening    LDA reconciliation : Yes

## 2025-06-12 NOTE — ASSESSMENT & PLAN NOTE
Patient with chest pain worsened while on HD  Trops are flat 0.14  EKG with no ischemic changes  Echo with worsening wall motion as per cardio  Seen by cardiology, plan was for Left heart cath 06/09 but patient had a cardiac arrest that morning; managing medically for now  - continue ASA/plavix/heparin gtt/statin  - Cardiology following      Patient with chest pain worsened while on HD  Trops are flat 0.14  EKG with no ischemic changes  Echo with worsening wall motion as per cardio  Started on heparin drip--> holding off at this time.  Just on DVT prophylaxis  Seen by cardiology, plan was for Left heart cath 06/09 but patient had a cardiac arrest that morning; managing medically for now  - continue ASA/plavix/heparin gtt/statin  - Cardiology following

## 2025-06-12 NOTE — ASSESSMENT & PLAN NOTE
- PEA arrest with ACLS with ROSC; reports of VTach; loaded with IV Amiodarone and placed on IV Amiodarone drip  - transitioned to oral Amiodarone yesterday; QTC yesterday 627 down to 502 this morning  - will hold oral Amiodarone today and decide if resumption needed tomorrow   - monitor closely; maintain K+ >4.0 Mg >2.0

## 2025-06-12 NOTE — PLAN OF CARE
Problem: SLP  Goal: SLP Goal  Description: Short Term Goals:  1. Ongoing swallow eval to determine safest diet level.   Outcome: Progressing   Pt seen in ICU, pt remains overall weak, poor voicing and overt clavicular/effortful breathing noted despite no change in SPO2 levels. Pt may have ice chips for comfort for now and essential whole meds with sips of water one by one at a time. Will follow up next date.

## 2025-06-12 NOTE — EICU
Intervention Initiated From:  Bedside    Margie intervened regarding:  Medication    Doctor Notified:  Yes    Comments: Dr. Pedro notified that bedside called eICU reporting pt was extubated today but he's unable to take po & has no NGT.  PO meds ordered for tonight, amiodarone is one of the drugs due this pm.  RN wants to know if it would be OK to hold the amiodarone

## 2025-06-12 NOTE — PLAN OF CARE
Chart reviewed / case discussed in am MDR   Pt extubated yesterday  s/p Cardiopulmonary arrest   Pt to undergo HD today.    Pt responds to questions appropriately.    wife Karmen [phone]   s/p Cardiac Arrest   ESRD pt on HD - pt drove himself to ED due to CP experienced at dialysis.        06/12/25 1724   Post-Acute Status   Post-Acute Authorization Other   Other Status See Comments  (to be determined per therapy eval)   Discharge Delays   (Pending medical stability)   Discharge Plan   Discharge Plan A   (to be determined)

## 2025-06-12 NOTE — ASSESSMENT & PLAN NOTE
- extensive PCI history with LAD and RCA PCI 3/97506, LCX 2019, LAD REZA 2023, LCX PCI 5/2023, LCX PTCA lithotripsy 9/2024 LCX PTCA 1/2025  -  presented with chest pain with concern for USA/NSTEMI C deferred due to cardiac arrest  - plan for medical management for now with DAPT, statin; no BB due to marginal BP; chest pain today likely related to CPR and more MSK in etiology; will plan to continue medical management for now; no plans for invasive strategy; patient very debilitated and recovering from acute event

## 2025-06-12 NOTE — ASSESSMENT & PLAN NOTE
>>ASSESSMENT AND PLAN FOR ESRD (END STAGE RENAL DISEASE) WRITTEN ON 6/7/2025  5:21 PM BY ERICH FRAZIER MD    Creatine stable for now. BMP reviewed- noted Estimated Creatinine Clearance: 12.8 mL/min (A) (based on SCr of 6.1 mg/dL (H)). according to latest data. Based on current GFR, CKD stage is end stage.  Monitor UOP and serial BMP and adjust therapy as needed. Renally dose meds. Avoid nephrotoxic medications and procedures.    ESRD On HD,

## 2025-06-12 NOTE — ASSESSMENT & PLAN NOTE
- currently intubated, following commands--> extubated on 06/11.  Doing well  - delirium precautions

## 2025-06-12 NOTE — PROGRESS NOTES
"[facility] Progress Note     Attending Physician: Pawan Garcia,*  Cardiology Attending Physician: Enoch Bray MD  Date of Admit: 2025      Subjective/Interval History:      Patient seen multiple times throughout the day with more tracking and purposely movements. On CRRT after SBT performed he was extubated on bipap.     Limited echo showed EF 30-35% similar to his previous echo on 25    Objective:     Last 24 Hour Vital Signs:  BP  Min: 88/42  Max: 161/69  Temp  Av.9 °F (36.1 °C)  Min: 91.8 °F (33.2 °C)  Max: 98.8 °F (37.1 °C)  Pulse  Av.5  Min: 74  Max: 90  Resp  Av.6  Min: 21  Max: 42  SpO2  Av.9 %  Min: 92 %  Max: 100 %  Height  Av' 10" (177.8 cm)  Min: 5' 10" (177.8 cm)  Max: 5' 10" (177.8 cm)  Weight  Av.6 kg (179 lb 15.2 oz)  Min: 81.6 kg (179 lb 14.3 oz)  Max: 81.6 kg (180 lb)  I/O last 3 completed shifts:  In: 1630.2 [I.V.:582.3; Other:500; NG/GT:304; IV Piggyback:243.9]  Out: 2365 [Urine:865; Other:1500]    Physical Examination:None/intbated -->bipap    Laboratory/Radiographic/Cardiac Data:  Personally Reviewed      Assessment/Plan:     Patient Active Problem List    Diagnosis Date Noted    Cardiogenic shock 2025    Anoxic encephalopathy due to cardiac arrest 2025    Acute respiratory failure with hypoxia 2025    Acute hypoxemic respiratory failure 2025    NSTEMI (non-ST elevated myocardial infarction) 2025    Thrombocytopenia, unspecified 2025    Hypokalemia 2025    Elevated troponin 2025    Insomnia 2024    Hyperparathyroidism 2024    S/P drug eluting coronary stent placement 2024    HFrEF (heart failure with reduced ejection fraction) 2024    Chronic anticoagulation 2024    History of MI (myocardial infarction) 2024    Hemodialysis catheter dysfunction 2024    S/P arteriovenous (AV) graft placement 2024    Membranous nephropathy determined by " biopsy 02/20/2024    ESRD (end stage renal disease) 02/16/2024    Paroxysmal atrial fibrillation 08/24/2023    Hypothyroidism 03/02/2023    Anemia due to chronic kidney disease, on chronic dialysis 12/15/2021    COPD (chronic obstructive pulmonary disease) 08/20/2021    Nuclear sclerosis, bilateral 06/08/2020    Nephrotic range proteinuria 04/06/2020    Cardiac arrest 10/04/2019    Bilateral carotid artery stenosis     Atherosclerosis of native coronary artery of native heart with unstable angina pectoris 03/29/2019    Venous insufficiency of both lower extremities 06/04/2018    Atherosclerosis of native artery of both lower extremities with intermittent claudication 03/02/2018    Hyperlipidemia 06/12/2015    DJD (degenerative joint disease), lumbar 05/27/2015    Claudication in peripheral vascular disease 06/13/2014    PAD (peripheral artery disease) 05/21/2014    Primary hypertension 04/29/2013        Cardiac arrest- PEA ARREST multiples ACLS round. qTC >600ms .   Multiple cardiac arrest in patient with extensive CAD history  ESRD  NSTEMI   Plan:   -DAPT  -Levophed  prn to keep MAP > 65  -Continue DAPT (recent PCI of Lcx)  -HOLDING PO MEDS cautious with IV amiodarone meds   -Monitor Qtc and electrolytes  -Extubate on BiPAP per ICU team.   -Please contact me for any cardiac question             Enoch Bray M.D.  Interventional Cardiology   [facility]    -Independently reviewing and interpreting (if not documented by myself) EKGs, echocardiograms, catherizations   -Obtaining a history, performing a relevant exam, counseling/educating the patient/family  -Documenting clinical information in the EMR including ordering of tests  -Care coordination and communicating with other health care professionals

## 2025-06-12 NOTE — PROGRESS NOTES
Nephrology Consult   Note     Consult Requested By: Juju Varela MD  Reason for Consult: ESRD     SUBJECTIVE:     ?    Review of Systems   Constitutional:  Negative for chills and fever.   Respiratory:  Negative for cough and shortness of breath.    Cardiovascular:  Negative for chest pain and leg swelling.   Gastrointestinal:  Negative for nausea.              OBJECTIVE:     Vital Signs (Most Recent)  Vitals:    06/12/25 0800 06/12/25 0856 06/12/25 0900 06/12/25 1000   BP: (!) 134/57  (!) 99/52 138/65   BP Location:       Patient Position:       Pulse: 76 78 77 76   Resp: 18 20 20 18   Temp:       TempSrc:       SpO2: 100% 100% 100% 100%   Weight:       Height:             Date 06/12/25 0700 - 06/13/25 0659   Shift 9696-5805 6769-2629 6840-9236 24 Hour Total   INTAKE   Shift Total(mL/kg)       OUTPUT   Urine(mL/kg/hr) 200   200   Shift Total(mL/kg) 200(2.5)   200(2.5)   Weight (kg) 81.6 81.6 81.6 81.6             Medications:   [START ON 6/13/2025] amiodarone  400 mg Oral BID    Followed by    [START ON 6/22/2025] amiodarone  200 mg Oral Daily    [START ON 6/13/2025] aspirin  81 mg Oral Daily    atorvastatin  40 mg Oral QHS    [START ON 6/13/2025] clopidogreL  75 mg Oral Daily    cyanocobalamin  1,000 mcg Subcutaneous Daily    folic acid  1 mg Oral Daily    heparin (porcine)  5,000 Units Subcutaneous Q8H    hydrALAZINE  25 mg Oral Q12H    [START ON 6/13/2025] levothyroxine  75 mcg Oral Before breakfast    perflutren protein-a microsphr  0.5 mL Intravenous Once    sevelamer carbonate  1,600 mg Oral TID WM             Physical Exam  Vitals and nursing note reviewed.   Constitutional:       General: He is not in acute distress.     Appearance: He is not diaphoretic.   HENT:      Head: Normocephalic and atraumatic.      Mouth/Throat:      Pharynx: No oropharyngeal exudate.   Eyes:      General: No scleral icterus.     Conjunctiva/sclera: Conjunctivae normal.      Pupils: Pupils are equal, round, and reactive  to light.   Cardiovascular:      Rate and Rhythm: Normal rate and regular rhythm.      Heart sounds: Normal heart sounds. No murmur heard.  Pulmonary:      Effort: Pulmonary effort is normal. No respiratory distress.      Breath sounds: No rales.   Abdominal:      General: Bowel sounds are normal. There is no distension.      Palpations: Abdomen is soft.      Tenderness: There is no abdominal tenderness.   Musculoskeletal:         General: Normal range of motion.      Cervical back: Normal range of motion and neck supple.      Right lower leg: No edema.      Left lower leg: No edema.      Comments: PEE ANN    Skin:     General: Skin is warm and dry.      Findings: No erythema.   Neurological:      Mental Status: He is alert and oriented to person, place, and time.      Cranial Nerves: No cranial nerve deficit.      Comments:     Psychiatric:         Mood and Affect: Affect normal.         Cognition and Memory: Memory normal.         Judgment: Judgment normal.         Laboratory:  Recent Labs   Lab 06/10/25  0416 06/11/25  0427 06/12/25  0440   WBC 9.85 7.72 8.90   HGB 8.1* 7.4* 7.3*   HCT 22.3* 20.8* 21.8*   * 100* 96*   MONO 5.0  0.49 7.3  0.56 8.0  0.71     Recent Labs   Lab 06/11/25  0427 06/11/25  0747 06/11/25  1222 06/12/25  0040 06/12/25  0440 06/12/25  0753   * 131*   < > 134* 134* 135*   K 3.4* 3.2*   < > 5.0 4.6 4.5   CL 85* 86*   < > 101 101 102   CO2 24 27   < > 21* 20* 21*   BUN 98* 100*   < > 54* 56* 60*   CREATININE 9.5* 9.5*   < > 6.0* 6.1* 6.1*   CALCIUM 7.5* 7.5*   < > 8.0* 8.1* 8.2*   PHOS 8.4* 8.4*  --   --  5.7*  --     < > = values in this interval not displayed.       Diagnostic Results:  X-Ray: Reviewed  US: Reviewed  Echo: Reviewed    Left Ventricle: The left ventricle is mildly dilated. There is eccentric hypertrophy. Regional wall motion abnormalities present. See diagram for wall motion findings. There is mildly reduced systolic function with a visually estimated ejection  fraction of 45 - 50%. There is indeterminate diastolic function.Elevated left ventricular filling pressure.    Right Ventricle: Normal right ventricular cavity size. Wall thickness is normal. Systolic function is normal.    Left Atrium: Left atrium is mildly dilated.    Right Atrium: Right atrium is mildly dilated.    Mitral Valve: There is mild to moderate regurgitation.    Pulmonic Valve: There is moderate regurgitation.    Pulmonary Artery: There is pulmonary hypertension. The estimated pulmonary artery systolic pressure is 47 mmHg.    IVC/SVC: Intermediate venous pressure at 8 mmHg.  ASSESSMENT/PLAN:     ESRD on HD TTS with Dr Loly Payne  in [facility]      Status post cardiac arrest.  Doing excellent considering what he went through.    Did not dialyze on Tuesday were too unstable.   Tolerated dialysis well yesterday.   Dialysis today as per regular schedule.  Patient with persistent hypokalemia required over 120 mEq of potassium   over 2 days in ICU to stabilize the levels.  Run on 4K today.  Also ran on 4K bath yesterday      2. HTN    was hypotensive after cardiac arrest, all of the blood pressure medications on hold.  Levophed was weaned from yesterday    3. 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  Hold Epogen in the settings of the cardiac arrest  with underlying CAD  Recent Labs   Lab 06/10/25  0416 06/11/25  0427 06/12/25  0440   HGB 8.1* 7.4* 7.3*   HCT 22.3* 20.8* 21.8*   * 100* 96*       Iron   Lab Results   Component Value Date    IRON 63 06/09/2025    TIBC 232 (L) 06/09/2025    FERRITIN 2,942.0 (H) 06/11/2025       4. MBD - PTH at goal   Replace magnesium  Sevelamer with each meal- Resume  Ergocalciferol 24796 units weekly    Recent Labs   Lab 06/12/25  0040 06/12/25  0440 06/12/25  0753   MG 2.3 2.4 2.4       Lab Results   Component Value Date    .8 (H) 06/04/2025    CALCIUM 8.2 (L) 06/12/2025    CAION 1.31 07/14/2020    PHOS 5.7 (H)  06/12/2025     Lab Results   Component Value Date    [id number] 9 (L) 03/14/2024     Acidosis - correct with HD   Lab Results   Component Value Date    CO2 21 (L) 06/12/2025     Hemodialysis access-left upper arm loop AV graft.     Nutrition/Hypoalbuminemia    Recent Labs   Lab 06/11/25  0427 06/12/25  0440   ALBUMIN 2.1* 2.3*     Nepro with meals TID.       Thank you for the consult, will follow  With any question please call [phone]  Alexia Yarbrough MD    [facility]    ESPERANZA Payne MD,   MD BRANDY Arredondo MD E. V. Harmon, NP    [address]  [place], [address] [phone]

## 2025-06-12 NOTE — ASSESSMENT & PLAN NOTE
- EKG with  yesterday; treated with Amiodarone due to ?VT  - transitioned to Amiodarone via OGT/po route but awaiting swallowing study; no VT noted on telemetry overnight  - will hold Amiodarone today; repeat EKG with  this AM- will re evaluate tomorrow and determine if continuation of Amiodarone needed; would avoid any other QT prolonging medications

## 2025-06-12 NOTE — ASSESSMENT & PLAN NOTE
- echo with EF 30-35%   - on BB and Hydralazine as an outpatient- held due to pressor use previously  - weaned off pressors and remains off; will start Hydralazine 25 BID today with hold parameters for SBP less than 120  - HD today with plans for 1L to be removed; CXR today with slight improvement

## 2025-06-12 NOTE — ASSESSMENT & PLAN NOTE
- history of PAF with EKG in 2023 with PAF; EKGs this admission with NSR   - on Coreg and Eliquis as an outpatient- both on hold for now   - monitor on telemetry

## 2025-06-12 NOTE — ASSESSMENT & PLAN NOTE
Patient with Hypoxic Respiratory failure which is Acute.  he is not on home oxygen. Supplemental oxygen was provided and noted- Oxygen Concentration (%):  [30] 30    .   Signs/symptoms of respiratory failure include- lethargy. Contributing diagnoses includes - CHF Labs and images were reviewed. Patient Has recent ABG, which has been reviewed. Will treat underlying causes and adjust management of respiratory failure as follows- on mechanical ventilation; plan to extubate today pending progression

## 2025-06-12 NOTE — ASSESSMENT & PLAN NOTE
Patient has long standing persistent (>12 months) atrial fibrillation. Patient is currently in sinus rhythm. [id number] Score: 1. The patients heart rate in the last 24 hours is as follows:  Pulse  Min: 74  Max: 96     Antiarrhythmics  amiodarone tablet 400 mg, 2 times daily, Oral  amiodarone tablet 200 mg, Daily, Oral    Anticoagulants  heparin (porcine) injection 5,000 Units, Every 8 hours, Subcutaneous    Plan  - Replete lytes with a goal of K>4, Mg >2  - Patient is anticoagulated, see medications listed above.  - Patient's afib is currently controlled

## 2025-06-12 NOTE — ASSESSMENT & PLAN NOTE
- likely ischemic  - volume control with dialysis--> plan for 1L removal on 06/12    Repeat echo showed   Left Ventricle: The left ventricle is moderately dilated. Mildly increased wall thickness. There is eccentric hypertrophy. Regional wall motion abnormalities present. See diagram for wall motion findings. There is moderately reduced systolic function with a visually estimated ejection fraction of 35 - 40%. Quantitated ejection fraction is 38%. Unable to assess diastolic function due to poor image quality.    Right Ventricle: The right ventricle is normal in size Systolic function is normal. TAPSE is 2.3 cm.    Overall the study quality was technically difficult.

## 2025-06-12 NOTE — SUBJECTIVE & OBJECTIVE
Review of Systems   Cardiovascular:  Positive for chest pain.     Objective:     Vital Signs (Most Recent):  Temp: 97.5 °F (36.4 °C) (06/12/25 1215)  Pulse: 77 (06/12/25 1300)  Resp: 18 (06/12/25 1300)  BP: (!) 152/74 (06/12/25 1300)  SpO2: 100 % (06/12/25 1300) Vital Signs (24h Range):  Temp:  [95.9 °F (35.5 °C)-98.8 °F (37.1 °C)] 97.5 °F (36.4 °C)  Pulse:  [74-90] 77  Resp:  [15-42] 18  SpO2:  [92 %-100 %] 100 %  BP: ()/(42-93) 152/74     Weight: 81.6 kg (179 lb 14.3 oz)  Body mass index is 25.81 kg/m².     SpO2: 100 %         Intake/Output Summary (Last 24 hours) at 6/12/2025 1400  Last data filed at 6/12/2025 1000  Gross per 24 hour   Intake 500 ml   Output 1970 ml   Net -1470 ml       Lines/Drains/Airways       Peripheral Intravenous Line  Duration                  Hemodialysis AV Fistula Left upper arm -- days         Peripheral IV - Single Lumen 06/07/25 1429 20 G Distal;Posterior;Right Forearm 4 days                       Physical Exam  Constitutional:       General: He is not in acute distress.     Appearance: He is well-developed. He is ill-appearing.   Neck:      Vascular: No JVD.   Cardiovascular:      Rate and Rhythm: Normal rate and regular rhythm.      Heart sounds: No murmur heard.     No gallop.   Pulmonary:      Effort: Pulmonary effort is normal. No respiratory distress.      Breath sounds: Normal breath sounds. No wheezing.   Abdominal:      General: Bowel sounds are normal. There is no distension.      Palpations: Abdomen is soft.      Tenderness: There is no abdominal tenderness.   Musculoskeletal:      Cervical back: Normal range of motion and neck supple.   Skin:     General: Skin is warm and dry.   Neurological:      Mental Status: He is alert.      Comments: Disoriented    Psychiatric:         Behavior: Behavior normal.         Thought Content: Thought content normal.         Judgment: Judgment normal.              Significant Imaging: Echocardiogram: Transthoracic echo (TTE)  complete (Cupid Only):   Results for orders placed or performed during the hospital encounter of 06/07/25   Echo   Result Value Ref Range    BSA 2.01 m2    Child's Biplane MOD Ejection Fraction 33 %    A2C EF 25 %    A4C EF 37 %    LVIDd 4.4 3.5 - 6.0 cm    LV Systolic Volume 55 mL    LV Systolic Volume Index 27.5 mL/m2    LVIDs 3.6 2.1 - 4.0 cm    LV Diastolic Volume 89 mL    LV Diastolic Volume Index 44.50 mL/m2    LV EDV A2C 100.460657430705486 mL    LV EDV A4C 109.71 mL    Left Ventricular End Systolic Volume by Teichholz Method 54.60 mL    Left Ventricular End Diastolic Volume by Teichholz Method 88.67 mL    IVS 0.9 0.6 - 1.1 cm    FS 18.2 (A) 28 - 44 %    Left Ventricle Relative Wall Thickness 0.50 cm    PW 1.1 0.6 - 1.1 cm    LV mass 147.8 g    LV Mass Index 73.9 g/m2    ZLVIDS 0.05     ZLVIDD -2.81     Narrative      Left Ventricle: The left ventricle is normal in size. Mildly increased   wall thickness. Global hypokinesis present with some regional variability.    Septal motion is consistent with bundle branch block. There is moderately   reduced systolic function with a visually estimated ejection fraction of   30 - 35%.    Right Ventricle: The right ventricle is normal in size Systolic   function is normal.

## 2025-06-12 NOTE — PLAN OF CARE
Problem: Wound  Goal: Optimal Functional Ability  Outcome: Progressing     Problem: Fall Injury Risk  Goal: Absence of Fall and Fall-Related Injury  Outcome: Progressing     Problem: Comorbidity Management  Goal: Blood Pressure in Desired Range  Outcome: Progressing     Problem: Skin Injury Risk Increased  Goal: Skin Health and Integrity  Outcome: Progressing     Problem: Delirium  Goal: Optimal Coping  Outcome: Progressing     Problem: Delirium  Goal: Improved Attention and Thought Clarity  Outcome: Progressing

## 2025-06-12 NOTE — ASSESSMENT & PLAN NOTE
Anemia is likely due to chronic disease due to ESRD. Most recent hemoglobin and hematocrit are listed below.  Recent Labs     06/10/25  0416 06/11/25  0427 06/12/25  0440   HGB 8.1* 7.4* 7.3*   HCT 22.3* 20.8* 21.8*     Plan  - Monitor serial CBC: Daily  - Transfuse PRBC if patient becomes hemodynamically unstable, symptomatic or H/H drops below 7/21.  - Patient has not received any PRBC transfusions to date  - Patient's anemia is currently stable

## 2025-06-12 NOTE — PROGRESS NOTES
[facility] - Intensive Care  Pulmonology  Progress Note    Patient Name: Domingo Gross  MRN: [id number]  Admission Date: 6/7/2025  Hospital Length of Stay: 5 days  Code Status: Full Code  Attending Provider: Juju Varela MD  Primary Care Provider: Geeta Coffey MD   Principal Problem: Cardiac arrest    Subjective:     HPI:  64 year old man with history of ESRD on HD, PAD, CAD status post PCI, cardiac arrest, heart failure that presents to the ED on 06/07 for evaluation of chest pain. Patient was admitted for NSTEMI and started on heparin drip and DAPT. Course was complicated by code blue this morning, no pulse. Patient was coded accordingly to ACLS protocol, ROSC in 15 mins. Patient was intubated. Patient was transferred to ICU. Post ROSC, pupils are dilated and patient posturing. Patient was started on TTM. Patient developed recurrent cardiac arrest x2 in the ICU and ROSC in less than 5 mins.    Overview/Hospital Course:  No notes on file    Interval History:   Patient is status post extubation on 06/11. Patient remains on BiPAP overnight. Trial off BiPAP this morning.      Objective:     Vital Signs (Most Recent):  Temp: 98.5 °F (36.9 °C) (06/12/25 0759)  Pulse: 77 (06/12/25 0900)  Resp: 20 (06/12/25 0900)  BP: (!) 99/52 (06/12/25 0900)  SpO2: 100 % (06/12/25 0900) Vital Signs (24h Range):  Temp:  [95.9 °F (35.5 °C)-98.8 °F (37.1 °C)] 98.5 °F (36.9 °C)  Pulse:  [74-90] 77  Resp:  [15-42] 20  SpO2:  [92 %-100 %] 100 %  BP: ()/(42-93) 99/52     Weight: 81.6 kg (179 lb 14.3 oz)  Body mass index is 25.81 kg/m².      Intake/Output Summary (Last 24 hours) at 6/12/2025 0956  Last data filed at 6/12/2025 0500  Gross per 24 hour   Intake 600.13 ml   Output 1920 ml   Net -1319.87 ml        Physical Exam  Vitals reviewed.   Constitutional:       General: He is not in acute distress.     Appearance: He is not ill-appearing.   HENT:      Head: Normocephalic and atraumatic.   Eyes:      Conjunctiva/sclera:  Conjunctivae normal.   Cardiovascular:      Rate and Rhythm: Normal rate and regular rhythm.      Pulses: Normal pulses.      Heart sounds: No murmur heard.  Pulmonary:      Effort: Pulmonary effort is normal. No respiratory distress.      Breath sounds: Normal breath sounds. No wheezing.   Abdominal:      Palpations: Abdomen is soft.   Musculoskeletal:      Right lower leg: No edema.      Left lower leg: No edema.   Neurological:      General: No focal deficit present.      Mental Status: He is alert.   Psychiatric:         Mood and Affect: Mood normal.         Behavior: Behavior normal.           Review of Systems    Vents:  Vent Mode: A/CMV-VC (06/11/25 1728)  Set Rate: 28 BPM (06/11/25 1728)  Vt Set: 480 mL (06/11/25 1728)  PEEP/CPAP: 5 cmH20 (06/11/25 1728)  Oxygen Concentration (%): 30 (06/12/25 0700)  Peak Airway Pressure: 37.8 cmH20 (06/11/25 1728)  Plateau Pressure: 18.8 cmH20 (06/11/25 1612)  Total Ve: 13.4 L/m (06/11/25 1728)  F/VT Ratio<105 (RSBI): (!) 58.46 (06/11/25 1728)    Lines/Drains/Airways       Drain  Duration                  Urethral Catheter 06/09/25 0739 Straight-tip 16 Fr. 3 days              Peripheral Intravenous Line  Duration                  Hemodialysis AV Fistula Left upper arm -- days         Peripheral IV - Single Lumen 06/07/25 1429 20 G Distal;Posterior;Right Forearm 4 days                    Significant Labs:    CBC/Anemia Profile:  Recent Labs   Lab 06/11/25  0427 06/11/25  1223 06/11/25  1427 06/12/25  0440   WBC 7.72  --   --  8.90   HGB 7.4*  --   --  7.3*   HCT 20.8*  --   --  21.8*   *  --   --  96*   MCV 79*  --   --  84   RDW 15.8*  --   --  15.9*   FERRITIN  --  2,942.0*  --   --    FOLATE  --   --  3.7*  --    [id number]  --   --  221  --         Chemistries:  Recent Labs   Lab 06/11/25  0427 06/11/25  0747 06/11/25  1222 06/12/25  0040 06/12/25  0440 06/12/25  0753   * 131*   < > 134* 134* 135*   K 3.4* 3.2*   < > 5.0 4.6 4.5   CL 85* 86*   < > 101 101  102   CO2 24 27   < > 21* 20* 21*   BUN 98* 100*   < > 54* 56* 60*   CREATININE 9.5* 9.5*   < > 6.0* 6.1* 6.1*   CALCIUM 7.5* 7.5*   < > 8.0* 8.1* 8.2*   ALBUMIN 2.1*  --   --   --  2.3*  --    MG 2.5 2.5   < > 2.3 2.4 2.4   PHOS 8.4* 8.4*  --   --  5.7*  --     < > = values in this interval not displayed.       All pertinent labs within the past 24 hours have been reviewed.    Significant Imaging:  I have reviewed all pertinent imaging results/findings within the past 24 hours.  Assessment/Plan:   Acute Encephalopathy  CT head showed enlargement of the superior ophthalmic vein bilaterally but there is no additional evidence for acute intracranial process   Likely due to anoxic brain injury  Status post TTM  Improving mentation  Delirium precautions     Cardiac arrest  Multiple cardiac arrest in patient with extensive CAD history  CAD  Shock, likely cardiogenic shock  Ventricular Tachycardia  TTE showed LVEF of 30-35%, global hypokinesis present with some regional variability, septal motion is consistent with bundle branch block     Off Levophed  Continue amiodarone  Cardiology following     Acute Hypoxemia Respiratory Failure  CXR showed bilateral interstitial infiltrates  Pulmonary edema  Patient is status post extubation on 06/11 to BiPAP  Continue oxygen therapy during the day  BiPAP at night  Wean FiO2 as tolerated     Hypokalemia, improved  Continue to monitor     ESRD  Status post dialysis yesterday  Nephrology following     Normocytic anemia  Due to folic acid, B12 def and CKD  Start B12 and folate acid repletion  Transfuse for hemoglobin < 7 or active bleeding with worsening hemodynamic instability         .Feeding/fluids: N/A  Analgesia: N/A  Sedation: N/A  Thromboprophylaxis: Heparin  Head up position: Yes  Ulcer prophylaxis: N/A  Glycemic control: Yes  Spontaneous breathing trial: N/A  Bowel care: Yes  Indwelling catheter removal: Peripheral line, HD AV fistula left upper arm  Deescalation of  antibiotics: N/A         Code: Full       Disposition: Possible stepdown later today if remains stable.        Patient was seen with the attending physician MD Lizz Conner MD  Pulmonology  [facility] - Intensive Care    Pt seen and examined with Pulmonary/Critical Care team and this note reviewed and validated with the following additional comments: Verbalizes appropriately to questions, follows simple commands. No respiratory distress.Dialysis today.  Potassium better. Step down later today,    The complexity of Medical Decision Making (MDM) was high.  The number of problems addressed was 4.  The risk of complications and/or morbidity/mortality was high.  The tests ordered were BMP.  I communicated with the following providers Nephrology, .  Time spent in the care of this patient was 40 minutes.    Mickey Doe MD  Phone [phone]

## 2025-06-12 NOTE — PHYSICIAN QUERY
Please clarify if the BRETT (acute kidney injury) has been:     Ruled out. Pt has had ESRD and was already on HD.

## 2025-06-12 NOTE — PROGRESS NOTES
Nephrology Consult     Consult Requested By: Juju Varela MD  Reason for Consult: ESRD     SUBJECTIVE:     ?    Review of Systems   Unable to perform ROS: Critical illness       Past Medical History:   Diagnosis Date    Acute congestive heart failure 09/13/2024    Allergy     Anemia     Anticoagulant long-term use     Arthritis     CKD (chronic kidney disease) stage 4, GFR 15-29 ml/min     Closed fracture of transverse process of lumbar vertebra 02/16/2024    COPD (chronic obstructive pulmonary disease) 08/20/2021    Coronary artery disease     Heart attack 10/04/2019    Hematuria     Hemothorax     Hyperlipidemia     Hypertension     Hyperuricemia     Hypocalcemia     Hypokalemia     Hypophosphatemia     Hypothyroidism 03/02/2023    PAD (peripheral artery disease)     Proteinuria     Vitamin D deficiency           OBJECTIVE:     Vital Signs (Most Recent)  Vitals:    06/12/25 0800 06/12/25 0856 06/12/25 0900 06/12/25 1000   BP: (!) 134/57  (!) 99/52 138/65   BP Location:       Patient Position:       Pulse: 76 78 77 76   Resp: 18 20 20 18   Temp:       TempSrc:       SpO2: 100% 100% 100% 100%   Weight:       Height:                         Medications:   [START ON 6/13/2025] amiodarone  400 mg Oral BID    Followed by    [START ON 6/22/2025] amiodarone  200 mg Oral Daily    [START ON 6/13/2025] aspirin  81 mg Oral Daily    atorvastatin  40 mg Oral QHS    [START ON 6/13/2025] clopidogreL  75 mg Oral Daily    cyanocobalamin  1,000 mcg Subcutaneous Daily    folic acid  1 mg Oral Daily    heparin (porcine)  5,000 Units Subcutaneous Q8H    hydrALAZINE  25 mg Oral Q12H    [START ON 6/13/2025] levothyroxine  75 mcg Oral Before breakfast    perflutren protein-a microsphr  0.5 mL Intravenous Once    sevelamer carbonate  1,600 mg Oral TID WM             Physical Exam  Vitals and nursing note reviewed.   Constitutional:       Comments: Intubated sedated with propofol   HENT:      Head: Normocephalic and atraumatic.       Mouth/Throat:      Pharynx: No oropharyngeal exudate.   Eyes:      General: No scleral icterus.     Conjunctiva/sclera: Conjunctivae normal.      Pupils: Pupils are equal, round, and reactive to light.   Neck:      Vascular: No JVD.   Cardiovascular:      Rate and Rhythm: Normal rate and regular rhythm.      Heart sounds: Normal heart sounds. No murmur heard.     No friction rub.   Pulmonary:      Effort: Pulmonary effort is normal. No respiratory distress.      Breath sounds: Normal breath sounds. No wheezing or rales.   Abdominal:      General: Bowel sounds are normal. There is no distension.      Palpations: Abdomen is soft.      Tenderness: There is no abdominal tenderness.   Musculoskeletal:         General: Normal range of motion.      Cervical back: Normal range of motion and neck supple.      Right lower leg: No edema.      Left lower leg: No edema.      Comments: LUE AVG    Skin:     General: Skin is warm and dry.      Findings: No erythema or rash.   Neurological:      Comments: Unresponsive.  Gag and cough reflexes are absent, posturing, sluggish pupillary reflex     Psychiatric:         Mood and Affect: Affect normal.         Cognition and Memory: Memory normal.         Laboratory:  Recent Labs   Lab 06/10/25  0416 06/11/25  0427 06/12/25  0440   WBC 9.85 7.72 8.90   HGB 8.1* 7.4* 7.3*   HCT 22.3* 20.8* 21.8*   * 100* 96*   MONO 5.0  0.49 7.3  0.56 8.0  0.71     Recent Labs   Lab 06/11/25  0427 06/11/25  0747 06/11/25  1222 06/12/25  0040 06/12/25  0440 06/12/25  0753   * 131*   < > 134* 134* 135*   K 3.4* 3.2*   < > 5.0 4.6 4.5   CL 85* 86*   < > 101 101 102   CO2 24 27   < > 21* 20* 21*   BUN 98* 100*   < > 54* 56* 60*   CREATININE 9.5* 9.5*   < > 6.0* 6.1* 6.1*   CALCIUM 7.5* 7.5*   < > 8.0* 8.1* 8.2*   PHOS 8.4* 8.4*  --   --  5.7*  --     < > = values in this interval not displayed.       Diagnostic Results:  X-Ray: Reviewed  US: Reviewed  Echo: Reviewed    Left Ventricle: The left  ventricle is mildly dilated. There is eccentric hypertrophy. Regional wall motion abnormalities present. See diagram for wall motion findings. There is mildly reduced systolic function with a visually estimated ejection fraction of 45 - 50%. There is indeterminate diastolic function.Elevated left ventricular filling pressure.    Right Ventricle: Normal right ventricular cavity size. Wall thickness is normal. Systolic function is normal.    Left Atrium: Left atrium is mildly dilated.    Right Atrium: Right atrium is mildly dilated.    Mitral Valve: There is mild to moderate regurgitation.    Pulmonic Valve: There is moderate regurgitation.    Pulmonary Artery: There is pulmonary hypertension. The estimated pulmonary artery systolic pressure is 47 mmHg.    IVC/SVC: Intermediate venous pressure at 8 mmHg.  ASSESSMENT/PLAN:   6/9/25:Patient found down site noted in his room around 3:00 a.m., resuscitative measure initiated.  Transferred to ICU.  Coded 2 more times this morning around 7:00 a.m..  Right now is on cooling protocol.  Patient's wife is bedside discussed the grave prognosis.  For now supportive measure only.  Patient is DNR.  I reviewed all of his electrolytes and volume status.  As of right now no indication for emergent dialysis.  But monitoring closely.  If the patient is more hemodynamically stable and there is any chance of surviving this episode, might need to be on CRRT    6/10/25:  Situation is unchanged from yesterday.  Still no gallop or no cough reflex.  Some corneal reflex.  Patient is grimacing to pain.  With any attempt to reposition patient is hemodynamically unstable and becomes bradycardic.  Still on Levophed but the dose is lower compared to yesterday.  Volume and electrolytes acceptable.  No indication for emergent dialysis today.     6/11/25: Patient  appears to be doing so much better.  Has a all reflexes today and grimacing toward the pain in even opening eyes or the voice commands.  Not  following commands yet but definite neurological improvement.   We will do dialysis today.  Very gentle, electrolytes still acceptable accept phosphorus and acidosis.  But we will do 17 gauge needle QB//600, and 4 K potassium 25 bicarb bath      Total critical care time 35 minutes: included management of organ failures related to critical illness, titration of continuous infusions, review of pertinent labs and imaging studies, discussion with primary team      Thank you for the consult, will follow  With any question please call [phone]  Alexia Yarbrough MD    [facility]    ESPERANZA Payne MD,   MD BRANDY Arredondo MD E. V. Harmon, NP    [address]  [place], [id number] [phone]

## 2025-06-12 NOTE — SUBJECTIVE & OBJECTIVE
Interval History:   Patient is status post extubation on 06/11. Patient remains on BiPAP overnight. Trial off BiPAP this morning.      Objective:     Vital Signs (Most Recent):  Temp: 98.5 °F (36.9 °C) (06/12/25 0759)  Pulse: 77 (06/12/25 0900)  Resp: 20 (06/12/25 0900)  BP: (!) 99/52 (06/12/25 0900)  SpO2: 100 % (06/12/25 0900) Vital Signs (24h Range):  Temp:  [95.9 °F (35.5 °C)-98.8 °F (37.1 °C)] 98.5 °F (36.9 °C)  Pulse:  [74-90] 77  Resp:  [15-42] 20  SpO2:  [92 %-100 %] 100 %  BP: ()/(42-93) 99/52     Weight: 81.6 kg (179 lb 14.3 oz)  Body mass index is 25.81 kg/m².      Intake/Output Summary (Last 24 hours) at 6/12/2025 0956  Last data filed at 6/12/2025 0500  Gross per 24 hour   Intake 600.13 ml   Output 1920 ml   Net -1319.87 ml        Physical Exam  Vitals reviewed.   Constitutional:       General: He is not in acute distress.     Appearance: He is not ill-appearing.   HENT:      Head: Normocephalic and atraumatic.   Eyes:      Conjunctiva/sclera: Conjunctivae normal.   Cardiovascular:      Rate and Rhythm: Normal rate and regular rhythm.      Pulses: Normal pulses.      Heart sounds: No murmur heard.  Pulmonary:      Effort: Pulmonary effort is normal. No respiratory distress.      Breath sounds: Normal breath sounds. No wheezing.   Abdominal:      Palpations: Abdomen is soft.   Musculoskeletal:      Right lower leg: No edema.      Left lower leg: No edema.   Neurological:      General: No focal deficit present.      Mental Status: He is alert.   Psychiatric:         Mood and Affect: Mood normal.         Behavior: Behavior normal.           Review of Systems    Vents:  Vent Mode: A/CMV-VC (06/11/25 1728)  Set Rate: 28 BPM (06/11/25 1728)  Vt Set: 480 mL (06/11/25 1728)  PEEP/CPAP: 5 cmH20 (06/11/25 1728)  Oxygen Concentration (%): 30 (06/12/25 0700)  Peak Airway Pressure: 37.8 cmH20 (06/11/25 1728)  Plateau Pressure: 18.8 cmH20 (06/11/25 1612)  Total Ve: 13.4 L/m (06/11/25 1728)  F/VT Ratio<105  (RSBI): (!) 58.46 (06/11/25 1728)    Lines/Drains/Airways       Drain  Duration                  Urethral Catheter 06/09/25 0739 Straight-tip 16 Fr. 3 days              Peripheral Intravenous Line  Duration                  Hemodialysis AV Fistula Left upper arm -- days         Peripheral IV - Single Lumen 06/07/25 1429 20 G Distal;Posterior;Right Forearm 4 days                    Significant Labs:    CBC/Anemia Profile:  Recent Labs   Lab 06/11/25  0427 06/11/25  1223 06/11/25  1427 06/12/25  0440   WBC 7.72  --   --  8.90   HGB 7.4*  --   --  7.3*   HCT 20.8*  --   --  21.8*   *  --   --  96*   MCV 79*  --   --  84   RDW 15.8*  --   --  15.9*   FERRITIN  --  2,942.0*  --   --    FOLATE  --   --  3.7*  --    [id number]  --   --  221  --         Chemistries:  Recent Labs   Lab 06/11/25  0427 06/11/25  0747 06/11/25  1222 06/12/25  0040 06/12/25  0440 06/12/25  0753   * 131*   < > 134* 134* 135*   K 3.4* 3.2*   < > 5.0 4.6 4.5   CL 85* 86*   < > 101 101 102   CO2 24 27   < > 21* 20* 21*   BUN 98* 100*   < > 54* 56* 60*   CREATININE 9.5* 9.5*   < > 6.0* 6.1* 6.1*   CALCIUM 7.5* 7.5*   < > 8.0* 8.1* 8.2*   ALBUMIN 2.1*  --   --   --  2.3*  --    MG 2.5 2.5   < > 2.3 2.4 2.4   PHOS 8.4* 8.4*  --   --  5.7*  --     < > = values in this interval not displayed.       All pertinent labs within the past 24 hours have been reviewed.    Significant Imaging:  I have reviewed all pertinent imaging results/findings within the past 24 hours.

## 2025-06-12 NOTE — EICU
Intervention Initiated From:  [facility] / [facility]    Margie intervened regarding:  Rounding (Video assessment)    VICU Night Rounds Checklist  24H Vital Sign Range:  Temp:  [91.8 °F (33.2 °C)-98.8 °F (37.1 °C)]   Pulse:  [66-90]   Resp:  [21-42]   BP: ()/(42-69)   SpO2:  [92 %-100 %]     Video rounds and LDA reconciliation

## 2025-06-12 NOTE — ASSESSMENT & PLAN NOTE
Patient's blood pressure range in the last 24 hours was: BP  Min: 99/52  Max: 173/81.The patient's inpatient anti-hypertensive regimen is listed below:  Current Antihypertensives  hydrALAZINE tablet 25 mg, Every 12 hours, Oral    Plan  - BP is controlled, no changes needed to their regimen  - holding antihypertensives in setting of arrest/shock

## 2025-06-12 NOTE — NURSING
Notified Kirsty NP that pt is c/o chest pain 10/10 but says its worse when he breathes and coughs. obtained an EKG with no noted ST changes. Kirsty at bedside assessing pt - states okay to give dilaudid PRN for pain control since appears to be pain from CPR and not cardiac in nature.

## 2025-06-12 NOTE — PT/OT/SLP EVAL
Speech Language Pathology Evaluation  Bedside Swallow    Patient Name:  Domingo Gross   MRN:  [id number]  Admitting Diagnosis: Cardiac arrest    Recommendations:                 General Recommendations:  Dysphagia therapy to establish safest diet post extubation and evaluate voice  Diet recommendations:  NPO, Other (Comment) (ice chips as needed)   Aspiration Precautions: Feed only when awake/alert, Frequent oral care, HOB to 90 degrees, Meds whole 1 at a time, Monitor for s/s of aspiration, and Standard aspiration precautions   General Precautions: Standard, other (see comments), respiratory, NPO (respiratory failure, hemodiaysis, post extubation, possible broke ribs from CPR)  Communication strategies:  Hard to understand pt when he talks due to hoarseness, breathiness, and weakness b/c of prior intubation.     Assessment:     Domingo Gross is a 64 y.o. male with an admittance of cardiac arrest when receiving dialysis. He presents with acute encephalopathy, anoxic ischemic brain injury, acute hypoxemic respiratory failure, poor clavicular/ effortful breathing due to CPR and poor voicing post extubation, with noted overall weakness.    History:   Per MD 64 y.o. male with PMH ESRD on HD, PAD with multiple interventions, CAD (mid LAD/prox RCA PCI 3/2019 and prox LCA in 10/2019 following out of hospital cardiac arrest), after cardiac arrest in setting of prox LCX occlusion treated with PCI, 9/16/2024 s/p intervention w cutting/shockwave PTCA to Lcx, 01/24/25 PCI of OM, new reduction in EF presents to ER with substernal chest pain that radiates to his throat. Report his chest pain feels like heaviness as if something is heavy sitting on his chest, started last night at rest, he took sublingual nitro which only slightly helped but he was still feeling it, today as he was having HD the pain got worse so he came to the ED.     Patient denies smoking or alcohol use  Past Medical History:   Diagnosis Date    Acute  congestive heart failure 09/13/2024    Allergy     Anemia     Anticoagulant long-term use     Arthritis     CKD (chronic kidney disease) stage 4, GFR 15-29 ml/min     Closed fracture of transverse process of lumbar vertebra 02/16/2024    COPD (chronic obstructive pulmonary disease) 08/20/2021    Coronary artery disease     Heart attack 10/04/2019    Hematuria     Hemothorax     Hyperlipidemia     Hypertension     Hyperuricemia     Hypocalcemia     Hypokalemia     Hypophosphatemia     Hypothyroidism 03/02/2023    PAD (peripheral artery disease)     Proteinuria     Vitamin D deficiency        Past Surgical History:   Procedure Laterality Date    ABDOMINAL AORTOGRAPHY N/A 5/10/2019    Procedure: AORTOGRAM-ABDOMINAL;  Surgeon: Keo Jenkins MD;  Location: [facility] CATH LAB/EP;  Service: Cardiology;  Laterality: N/A;  RSFA intervention     ANGIOGRAM, CORONARY, WITH LEFT HEART CATHETERIZATION N/A 1/24/2025    Procedure: Angiogram, Coronary, with Left Heart Cath;  Surgeon: Valente Moran MD;  Location: [facility] CATH LAB/EP;  Service: Cardiology;  Laterality: N/A;    AORTOGRAPHY WITH SERIALOGRAPHY N/A 12/3/2021    Procedure: AORTOGRAM, WITH SERIALOGRAPHY;  Surgeon: Keo Jenkins MD;  Location: [facility] CATH LAB/EP;  Service: Cardiology;  Laterality: N/A;    AORTOGRAPHY WITH SERIALOGRAPHY N/A 4/1/2022    Procedure: AORTOGRAM, WITH SERIALOGRAPHY;  Surgeon: Keo Jenkins MD;  Location: [facility] CATH LAB/EP;  Service: Cardiology;  Laterality: N/A;    ATHERECTOMY, CORONARY N/A 4/25/2023    Procedure: Atherectomy-coronary;  Surgeon: Keo Jenkins MD;  Location: [facility] CATH LAB/EP;  Service: Cardiology;  Laterality: N/A;    ATHERECTOMY, CORONARY N/A 1/24/2025    Procedure: Atherectomy-coronary;  Surgeon: Valente Moran MD;  Location: [facility] CATH LAB/EP;  Service: Cardiology;  Laterality: N/A;    BONE MARROW BIOPSY Right 10/12/2021    Procedure: BIOPSY-BONE MARROW;  Surgeon: Naseem Woods MD;  Location: [facility] OR;  Service: Oncology;   Laterality: Right;    CARDIAC CATHETERIZATION      CATARACT EXTRACTION      CATARACT EXTRACTION W/  INTRAOCULAR LENS IMPLANT Right 6/8/2020    Procedure: EXTRACTION, CATARACT, WITH IOL INSERTION;  Surgeon: Tin Light MD;  Location: [facility] OR;  Service: Ophthalmology;  Laterality: Right;    CATARACT EXTRACTION W/  INTRAOCULAR LENS IMPLANT Left 7/2/2020    Procedure: EXTRACTION, CATARACT, WITH IOL INSERTION;  Surgeon: Tin Light MD;  Location: [facility] OR;  Service: Ophthalmology;  Laterality: Left;    COLONOSCOPY N/A 1/6/2022    Procedure: COLONOSCOPY;  Surgeon: Kathe Penaloza MD;  Location: [facility] ENDO (2ND FLR);  Service: Endoscopy;  Laterality: N/A;  COVID test at [facility] on 1/3-GT  okay to hold Plavix for 5 days and aspirin per Dr. Becerra  2nd floor due toextensive cardiac history   instructions mailed and my [facility] portal -     CORONARY ANGIOGRAPHY N/A 3/29/2019    Procedure: ANGIOGRAM, CORONARY ARTERY;  Surgeon: Keo Jenkins MD;  Location: [facility] CATH LAB/EP;  Service: Cardiology;  Laterality: N/A;    CORONARY ANGIOGRAPHY Left 10/11/2019    Procedure: ANGIOGRAM, CORONARY ARTERY;  Surgeon: Keo Jenkins MD;  Location: [facility] CATH LAB/EP;  Service: Cardiology;  Laterality: Left;    CORONARY ANGIOGRAPHY N/A 4/10/2023    Procedure: ANGIOGRAM, CORONARY ARTERY;  Surgeon: Keo Jenkins MD;  Location: [facility] CATH LAB/EP;  Service: Cardiology;  Laterality: N/A;    CORONARY ANGIOGRAPHY N/A 6/26/2024    Procedure: ANGIOGRAM, CORONARY ARTERY;  Surgeon: Enoch Tirado MD;  Location: [facility] CATH LAB/EP;  Service: Cardiology;  Laterality: N/A;    CORONARY ANGIOGRAPHY N/A 9/16/2024    Procedure: ANGIOGRAM, CORONARY ARTERY;  Surgeon: Enoch Tirado MD;  Location: [facility] CATH LAB/EP;  Service: Cardiology;  Laterality: N/A;    CORONARY ANGIOPLASTY WITH STENT PLACEMENT  03/29/2019    mid and distal RCA    CORONARY ANGIOPLASTY WITH STENT PLACEMENT N/A 1/24/2025    Procedure: ANGIOPLASTY, CORONARY ARTERY, WITH  STENT INSERTION;  Surgeon: Valente Moran MD;  Location: [facility] CATH LAB/EP;  Service: Cardiology;  Laterality: N/A;    ESOPHAGOGASTRODUODENOSCOPY N/A 1/6/2022    Procedure: EGD (ESOPHAGOGASTRODUODENOSCOPY);  Surgeon: Kathe Penaloza MD;  Location: [facility] ENDO ([address] [place] [address]);  Service: Endoscopy;  Laterality: N/A;    EYE SURGERY      INSERTION OF TUNNELED CENTRAL VENOUS HEMODIALYSIS CATHETER N/A 2/9/2024    Procedure: INSERTION, CATHETER, HEMODIALYSIS, DUAL LUMEN;  Surgeon: Wang Mendieta MD;  Location: [facility] OR;  Service: General;  Laterality: N/A;    INSTANTANEOUS WAVE-FREE RATIO (IFR) N/A 4/25/2023    Procedure: Instantaneous Wave-Free Ratio (IFR);  Surgeon: Keo Jenkins MD;  Location: [facility] CATH LAB/EP;  Service: Cardiology;  Laterality: N/A;    INTRAVASCULAR ULTRASOUND, NON-CORONARY  8/12/2022    Procedure: Intravascular Ultrasound, Non-Coronary;  Surgeon: Keo Jenkins MD;  Location: [facility] CATH LAB/EP;  Service: Cardiology;;    IVUS, CORONARY  4/25/2023    Procedure: IVUS, Coronary;  Surgeon: Keo Jenkins MD;  Location: [facility] CATH LAB/EP;  Service: Cardiology;;    IVUS, CORONARY  5/16/2023    Procedure: IVUS, Coronary;  Surgeon: Keo Jenkins MD;  Location: [facility] CATH LAB/EP;  Service: Cardiology;;  CX    IVUS, CORONARY  1/24/2025    Procedure: IVUS, Coronary;  Surgeon: Valente Moran MD;  Location: [facility] CATH LAB/EP;  Service: Cardiology;;    LEFT HEART CATHETERIZATION N/A 3/29/2019    Procedure: Left heart cath;  Surgeon: Keo Jenkins MD;  Location: [facility] CATH LAB/EP;  Service: Cardiology;  Laterality: N/A;    LEFT HEART CATHETERIZATION Left 10/8/2019    Procedure: Left heart cath;  Surgeon: Keo Jenkins MD;  Location: [facility] CATH LAB/EP;  Service: Cardiology;  Laterality: Left;    LEFT HEART CATHETERIZATION Left 4/10/2023    Procedure: Left heart cath;  Surgeon: Keo Jenkins MD;  Location: [facility] CATH LAB/EP;  Service: Cardiology;  Laterality: Left;    LEFT HEART CATHETERIZATION Left 4/25/2023     Procedure: Left heart cath;  Surgeon: Keo Jenkins MD;  Location: [facility] CATH LAB/EP;  Service: Cardiology;  Laterality: Left;    LEFT HEART CATHETERIZATION Left 5/16/2023    Procedure: Left heart cath;  Surgeon: Keo Jenkins MD;  Location: [facility] CATH LAB/EP;  Service: Cardiology;  Laterality: Left;    LEFT HEART CATHETERIZATION Left 6/26/2024    Procedure: Left heart cath;  Surgeon: Enoch Tirado MD;  Location: [facility] CATH LAB/EP;  Service: Cardiology;  Laterality: Left;    LEFT HEART CATHETERIZATION Left 9/16/2024    Procedure: Left heart cath;  Surgeon: Enoch Tirado MD;  Location: [facility] CATH LAB/EP;  Service: Cardiology;  Laterality: Left;    LITHOTRIPSY, CORONARY TRANSLUMINAL, PERCUTANEOUS  9/16/2024    Procedure: LITHOTRIPSY, CORONARY TRANSLUMINAL, PERCUTANEOUS;  Surgeon: Enoch Tirado MD;  Location: [facility] CATH LAB/EP;  Service: Cardiology;;    PERCUTANEOUS CORONARY INTERVENTION, ARTERY N/A 6/26/2024    Procedure: Percutaneous coronary intervention;  Surgeon: Enoch Tirado MD;  Location: [facility] CATH LAB/EP;  Service: Cardiology;  Laterality: N/A;    PERCUTANEOUS TRANSLUMINAL ANGIOPLASTY (PTA) OF PERIPHERAL VESSEL N/A 7/12/2019    Procedure: PTA, PERIPHERAL VESSEL;  Surgeon: Keo Jenikns MD;  Location: [facility] CATH LAB/EP;  Service: Cardiology;  Laterality: N/A;    PERCUTANEOUS TRANSLUMINAL ANGIOPLASTY (PTA) OF PERIPHERAL VESSEL Left 5/20/2022    Procedure: PTA, PERIPHERAL VESSEL;  Surgeon: Keo Jenkins MD;  Location: [facility] CATH LAB/EP;  Service: Cardiology;  Laterality: Left;    PERCUTANEOUS TRANSLUMINAL ANGIOPLASTY (PTA) OF PERIPHERAL VESSEL Right 8/12/2022    Procedure: PTA, PERIPHERAL VESSEL;  Surgeon: Keo Jenkins MD;  Location: [facility] CATH LAB/EP;  Service: Cardiology;  Laterality: Right;    PERCUTANEOUS TRANSLUMINAL BALLOON ANGIOPLASTY OF CORONARY ARTERY  4/25/2023    Procedure: Angioplasty-coronary;  Surgeon: Keo Jenkins MD;  Location: [facility] CATH LAB/EP;  Service:  Cardiology;;    PLACEMENT OF ARTERIOVENOUS GRAFT Left 4/15/2024    Procedure: INSERTION, GRAFT, ARTERIOVENOUS;  Surgeon: Scott Pascual MD;  Location: [facility] OR;  Service: General;  Laterality: Left;    PTCA, SINGLE VESSEL  5/16/2023    Procedure: PTCA, Single Vessel;  Surgeon: Keo Jenkins MD;  Location: [facility] CATH LAB/EP;  Service: Cardiology;;  CX    PTCA, SINGLE VESSEL  6/26/2024    Procedure: PTCA, Single Vessel;  Surgeon: Enoch Tirado MD;  Location: [facility] CATH LAB/EP;  Service: Cardiology;;    PTCA, SINGLE VESSEL Left 9/16/2024    Procedure: PTCA, Single Vessel;  Surgeon: Enoch Tirado MD;  Location: [facility] CATH LAB/EP;  Service: Cardiology;  Laterality: Left;    REMOVAL OF HEMODIALYSIS CATHETER Right 2/9/2024    Procedure: REMOVAL, CATHETER, HEMODIALYSIS;  Surgeon: Wang Mendieta MD;  Location: [facility] OR;  Service: General;  Laterality: Right;    REMOVAL OF TUNNELED CENTRAL VENOUS CATHETER (CVC) Right 5/20/2024    Procedure: REMOVAL, CATHETER, CENTRAL VENOUS, TUNNELED;  Surgeon: Scott Pascual MD;  Location: [facility];  Service: General;  Laterality: Right;    REPAIR, CHRONIC TOTAL OCCLUSION, CORONARY  6/26/2024    Procedure: Repair, Chronic Total Occlusion, Coronary;  Surgeon: Enoch Tirado MD;  Location: [facility] CATH LAB/EP;  Service: Cardiology;;    STENT, DRUG ELUTING, SINGLE VESSEL, CORONARY  4/25/2023    Procedure: Stent, Drug Eluting, Single Vessel, Coronary;  Surgeon: Keo Jenkins MD;  Location: [facility] CATH LAB/EP;  Service: Cardiology;;    STENT, DRUG ELUTING, SINGLE VESSEL, CORONARY  5/16/2023    Procedure: Stent, Drug Eluting, Single Vessel, Coronary;  Surgeon: Keo Jenkins MD;  Location: [facility] CATH LAB/EP;  Service: Cardiology;;  CX    TRANSESOPHAGEAL ECHOCARDIOGRAM WITH POSSIBLE CARDIOVERSION (JOSE W/ POSS CARDIOVERSION) N/A 6/19/2023    Procedure: Transesophageal echo (JOSE) intra-procedure log documentation;  Surgeon: Keo Jenkins MD;  Location: [facility] CATH  LAB/EP;  Service: Cardiology;  Laterality: N/A;       Prior Intubation HX:  Post extubation as of 6/11.    Chest X-Rays: r/o rib fractures post CPR; Monitoring leads overlie the chest.  Since prior examination of 06/09/2025, 10:33 hours.  Endotracheal tube, right internal jugular approach central venous catheter, enteric tubes appear to been discontinued     Grossly unchanged cardiac contours.  Patchy opacities persist in both lungs, somewhat improved since prior.     Small bilateral pleural effusions.     No definite pneumothorax.     Remainder examination not substantially changed.     CT results: Enlargement of the superior ophthalmic vein bilaterally, right greater than left, as discussed above, clinical and historical correlation is needed to determine need for additional follow-up.     There is no additional evidence for acute intracranial process, chronic change noted.     This report was flagged in Epic as abnormal.     Prior diet: NPO after admit due to intubation.      Subjective     Pt was seen for a swallow eval to determine safest diet post extubation. Pt oriented to self but do to voice weakness and chest pain was unable to fully evaluate cognitive status. Pt expressed his chest hurts and drinking small sips of water was uncomfortable but ice chips were okay.   Patient goals: None were stated. Minimal verbal output due to chest pain and VQ.      Pain/Comfort:  Pain Rating 1: 5/10 (Pt stated chest hurts but feels slighty better sitting up)    Respiratory Status: Nasal cannula, flow 2 L/min, RN planned to put pt on BIPAP    Objective:     Oral Musculature Evaluation  Oral Musculature: general weakness  Dentition:  (unable to view)  Secretion Management:  (dry crusted lip)  Mucosal Quality: cracked  Oral Labial Strength and Mobility:  (fair)  Lingual Strength and Mobility:  (fair)  Velar Elevation:  (unable to view)  Buccal Strength and Mobility:  (fair)  Volitional Cough: slighty weak  Volitional  Swallow: clear swallow  Voice Prior to PO Intake: very weak, breathy, hoarse    Bedside Swallow Eval:   Consistencies Assessed:  Thin liquids Ice chips x2 and teaspoon sip of water x2     Oral Phase:   Unable to fully assess because of chest/throat pain.   Pt was able to clear very small sips of thin liquids.     Pharyngeal Phase:   no overt clinical signs/symptoms of aspiration during ice chips and limited sips of water.    Compensatory Strategies  Effortful swallow  Volitional cough/throat clear  1 ice chip at a time     Treatment: SLP reported to RN pt is safe to swallow pills one at a time and if the pill is big half them before swallowing. SLP told RN she will follow up once pt is in a more comfortable state to determine safest diet. It is recommended pt remain NPO besides ice chips until further assessment.     Goals:   Multidisciplinary Problems       SLP Goals          Problem: SLP    Goal Priority Disciplines Outcome   SLP Goal     SLP Progressing   Description: Short Term Goals:  1. Ongoing swallow eval to determine safest diet level.                        Plan:     Patient to be seen:  3 x/week   Plan of Care expires:  07/11/25  Plan of Care reviewed with:  patient   SLP Follow-Up:  Yes       Discharge recommendations:      Barriers to Discharge:  None    Time Tracking:     SLP Treatment Date:    06/12/25  Speech Start Time:  1153  Speech Stop Time:  1204     Speech Total Time (min):  11 min    Billable Minutes: Eval Swallow and Oral Function 11    06/12/2025

## 2025-06-12 NOTE — EICU
Patient extubated today on bipap support and unable to take po medications.    HR 76    On amiodarone 400mg po BID( received 2 doses with last dose 14 hours ago)    On camera on BIPAP support 40%,spo2 99%, RR23    Echo EF 30-35%    Plan:  Amiodarone 150 mg IV bolus once over 30 minutes (will not give 300 mg as one dose as he was in cardiogenic shock and will repeat dose if needed after 1 hour)  Maybe able to start oral medications tomorrow.

## 2025-06-12 NOTE — PLAN OF CARE
Problem: Adult Inpatient Plan of Care  Goal: Plan of Care Review  Outcome: Progressing  Goal: Optimal Comfort and Wellbeing  Outcome: Progressing  Goal: Readiness for Transition of Care  Outcome: Progressing     Problem: Fall Injury Risk  Goal: Absence of Fall and Fall-Related Injury  Outcome: Progressing     Problem: Mechanical Ventilation Invasive  Goal: Effective Communication  Outcome: Progressing

## 2025-06-12 NOTE — PROGRESS NOTES
[facility] - Intensive Care  Cardiology  Progress Note    Patient Name: Domingo Gross  MRN: [id number]  Admission Date: 6/7/2025  Hospital Length of Stay: 5 days  Code Status: Full Code   Attending Physician: Juju Varela MD   Primary Care Physician: Geeta Coffey MD  Expected Discharge Date:   Principal Problem:Cardiac arrest    Subjective:     Hospital Course:     Per Dr. Salazar consult note 6/7/2025 64 y.o. male with PMH ESRD on HD, PAD with multiple interventions, CAD (mid LAD/prox RCA PCI 3/2019 and prox LCA in 10/2019 following out of hospital cardiac arrest), after cardiac arrest in setting of prox LCX occlusion treated with PCI, 9/16/2024 s/p intervention w cutting/shockwave PTCA to Lcx, 01/24/25 PCI of OM, new reduction in EF presents to ER with substernal chest pain that radiates to his throat. Reports his chest pain started last night which did not improved with nitro. Trops with significant delta from baseline. Concerning symptoms given extensive cardiovascular history.   CAD with multiple interventions/ NSTEMI: start heparin gtt, hold eliquis, continue DAPT, statin, bb  HFrEF - continue BB, will continue to optimize GDMT   Afib per EP- holding eliquis, continue heparin   ESRD on HD- please consult nephro   -NPO Sunday MN FOR CATH+/-PCI Monday. If refractory chest pain despite meds contact me - low threshold for cath.   -Trend troponin    6/9/2025 Cardiac arrest with PEA with ACLS initiated and VTach as well. LHC cancelled   Per  Cardiac arrest- PEA ARREST multiples ACLS round. qTC >600ms .   Multiple cardiac arrest in patient with extensive CAD history  ESRD  NSTEMI   Plan:   -DAPT  -Levophed  prn to keep MAP > 65  -Continue DAPT (recent PCI of Lcx)  -HOLDING PO MEDS cautious with IV amiodarone meds   -Monitor Qtc and electrolytes  -Extubate on BiPAP per ICU team.   -Please contact me for any cardiac question         Review of Systems   Cardiovascular:  Positive for chest pain.      Objective:     Vital Signs (Most Recent):  Temp: 97.5 °F (36.4 °C) (06/12/25 1215)  Pulse: 77 (06/12/25 1300)  Resp: 18 (06/12/25 1300)  BP: (!) 152/74 (06/12/25 1300)  SpO2: 100 % (06/12/25 1300) Vital Signs (24h Range):  Temp:  [95.9 °F (35.5 °C)-98.8 °F (37.1 °C)] 97.5 °F (36.4 °C)  Pulse:  [74-90] 77  Resp:  [15-42] 18  SpO2:  [92 %-100 %] 100 %  BP: ()/(42-93) 152/74     Weight: 81.6 kg (179 lb 14.3 oz)  Body mass index is 25.81 kg/m².     SpO2: 100 %         Intake/Output Summary (Last 24 hours) at 6/12/2025 1400  Last data filed at 6/12/2025 1000  Gross per 24 hour   Intake 500 ml   Output 1970 ml   Net -1470 ml       Lines/Drains/Airways       Peripheral Intravenous Line  Duration                  Hemodialysis AV Fistula Left upper arm -- days         Peripheral IV - Single Lumen 06/07/25 1429 20 G Distal;Posterior;Right Forearm 4 days                       Physical Exam  Constitutional:       General: He is not in acute distress.     Appearance: He is well-developed. He is ill-appearing.   Neck:      Vascular: No JVD.   Cardiovascular:      Rate and Rhythm: Normal rate and regular rhythm.      Heart sounds: No murmur heard.     No gallop.   Pulmonary:      Effort: Pulmonary effort is normal. No respiratory distress.      Breath sounds: Normal breath sounds. No wheezing.   Abdominal:      General: Bowel sounds are normal. There is no distension.      Palpations: Abdomen is soft.      Tenderness: There is no abdominal tenderness.   Musculoskeletal:      Cervical back: Normal range of motion and neck supple.   Skin:     General: Skin is warm and dry.   Neurological:      Mental Status: He is alert.      Comments: Disoriented    Psychiatric:         Behavior: Behavior normal.         Thought Content: Thought content normal.         Judgment: Judgment normal.              Significant Imaging: Echocardiogram: Transthoracic echo (TTE) complete (Cupid Only):   Results for orders placed or performed during  the hospital encounter of 06/07/25   Echo   Result Value Ref Range    BSA 2.01 m2    Child's Biplane MOD Ejection Fraction 33 %    A2C EF 25 %    A4C EF 37 %    LVIDd 4.4 3.5 - 6.0 cm    LV Systolic Volume 55 mL    LV Systolic Volume Index 27.5 mL/m2    LVIDs 3.6 2.1 - 4.0 cm    LV Diastolic Volume 89 mL    LV Diastolic Volume Index 44.50 mL/m2    LV EDV A2C [id number] mL    LV EDV A4C 109.71 mL    Left Ventricular End Systolic Volume by Teichholz Method 54.60 mL    Left Ventricular End Diastolic Volume by Teichholz Method 88.67 mL    IVS 0.9 0.6 - 1.1 cm    FS 18.2 (A) 28 - 44 %    Left Ventricle Relative Wall Thickness 0.50 cm    PW 1.1 0.6 - 1.1 cm    LV mass 147.8 g    LV Mass Index 73.9 g/m2    ZLVIDS 0.05     ZLVIDD -2.81     Narrative      Left Ventricle: The left ventricle is normal in size. Mildly increased   wall thickness. Global hypokinesis present with some regional variability.    Septal motion is consistent with bundle branch block. There is moderately   reduced systolic function with a visually estimated ejection fraction of   30 - 35%.    Right Ventricle: The right ventricle is normal in size Systolic   function is normal.       Assessment and Plan:     Brief HPI: Seen on AM NP rounds while resting in bed. Extubated. Very weak but answering questions appropriately. Briefly updated on POC and appears to understand. Informed patient of plan for continued update on POC     * Cardiac arrest  - PEA arrest with ACLS with ROSC; reports of VTach; loaded with IV Amiodarone and placed on IV Amiodarone drip  - transitioned to oral Amiodarone yesterday; QTC yesterday 627 down to 502 this morning  - will hold oral Amiodarone today and decide if resumption needed tomorrow   - monitor closely; maintain K+ >4.0 Mg >2.0    Prolonged Q-T interval on ECG  - EKG with  yesterday; treated with Amiodarone due to ?VT  - transitioned to Amiodarone via OGT/po route but awaiting swallowing study; no VT noted  on telemetry overnight  - will hold Amiodarone today; repeat EKG with  this AM- will re evaluate tomorrow and determine if continuation of Amiodarone needed; would avoid any other QT prolonging medications     NSTEMI (non-ST elevated myocardial infarction)  - presented with chest pain; initial troponin 0.1-0.3 with trend up to 1.9 after cardiac arrest  - history of CAD; originally planned for LHC but deferred given cardiac arrest; on ASA and Plasix as well as statin; previously on IV Heparin but held; no ARB or BB due to marginal BP  - will plan to continued GDMT for NSTEMI; no plans for LHC as of now unless acute changes noted on EKG; complaints of chest pain today worse with inspiration or movement likely related to rib fractures from CPR; EKG with no KALIE    HFrEF (heart failure with reduced ejection fraction)  - echo with EF 30-35%   - on BB and Hydralazine as an outpatient- held due to pressor use previously  - weaned off pressors and remains off; will start Hydralazine 25 BID today with hold parameters for SBP less than 120  - HD today with plans for 1L to be removed; CXR today with slight improvement     Atherosclerosis of native coronary artery of native heart with unstable angina pectoris  - extensive PCI history with LAD and RCA PCI 3/64813, LCX 2019, LAD REZA 2023, LCX PCI 5/2023, LCX PTCA lithotripsy 9/2024 LCX PTCA 1/2025  -  presented with chest pain with concern for USA/NSTEMI LHC deferred due to cardiac arrest  - plan for medical management for now with DAPT, statin; no BB due to marginal BP; chest pain today likely related to CPR and more MSK in etiology; will plan to continue medical management for now; no plans for invasive strategy; patient very debilitated and recovering from acute event     Paroxysmal atrial fibrillation  - history of PAF with EKG in 2023 with PAF; EKGs this admission with NSR   - on Coreg and Eliquis as an outpatient- both on hold for now   - monitor on telemetry          VTE Risk Mitigation (From admission, onward)           Ordered     heparin (porcine) injection 5,000 Units  Every 8 hours         06/11/25 1415     IP VTE HIGH RISK PATIENT  Once         06/07/25 1155     Place sequential compression device  Until discontinued         06/07/25 1155                > 40 minutes was spent in the care of this patient for evaluation, critical thinking and decision making. Also discussion with patient's wife as to present condition. Not all time was spent in direct face to face with patient as time was spent reviewing ECGs, CXR, labs, medications and past studies.           Plan as detailed wife. I personally called his wife to update her on his current situation. At the time of my call, she had no specific questions. On Monday she was at the bedside after his cardiac arrest and discussed code status with decision for DNR. We revisited this today given his status was changed from DNR back to full code. I explained to her he remains critically ill but we are slightly more optimistic now that he is off medications to support his BP as well as the ventilator. She stated she was happy to hear that he was making a little progress and feels that he would be in favor of all resuscitative efforts. We briefly discussed this with the patient but given his lethargy and sedative medications we deferred the final decision to his wife. We plan to have ongoing conversations as his neurological status improves     BAO Ignacio, ANP  Cardiology  [facility] - Intensive Care

## 2025-06-12 NOTE — PHYSICIAN QUERY
Due to conflicting documentation, Please clarify the type of atrial fibrillation.     Paroxysmal atrial fibrillation. Pt has been and is now in sinus rhythm.

## 2025-06-12 NOTE — ASSESSMENT & PLAN NOTE
Patient had cardiac arrest requiring ACLS x3 on 6/9  Currently intubated and mechanically ventilated  Hypothermia protocol initiated, s/p rewarming  Remarkable improvement, following commands and tracking; off sedation  - extubated on 06/11.  Tolerating well  - Pulmonology and Cardiology following

## 2025-06-13 NOTE — PLAN OF CARE
Problem: SLP  Goal: SLP Goal  Description: Short Term Goals:  1. Ongoing swallow eval to determine safest diet level.   2. Pt tolerate FULL liquids with no audible dysphagia signs.       Outcome: Progressing   Pt seen at bedside this date in ICU, pt with more confusion as compared to last session. Pt needed redirection and cues to respond to questions. Pt was delayed and did agree to sips of water. Pt may advance to FULL liquids for now, needs 1:1 assistance for feeding, slow rate and whole meds presented one by one with sips of water.

## 2025-06-13 NOTE — ASSESSMENT & PLAN NOTE
- presented with chest pain; initial troponin 0.1-0.3 with trend up to 1.9 after cardiac arrest  - history of CAD; originally planned for LHC but deferred given cardiac arrest; on ASA and Plasix as well as statin; previously on IV Heparin but held; no ARB or BB due to marginal BP  - will plan to continued GDMT for NSTEMI; no plans for LHC as of now unless acute changes noted on EKG

## 2025-06-13 NOTE — ASSESSMENT & PLAN NOTE
Patient with Hypoxic Respiratory failure which is Acute.  he is not on home oxygen. Supplemental oxygen was provided and noted- Oxygen Concentration (%):  [28-40] 28    .   Signs/symptoms of respiratory failure include- lethargy. Contributing diagnoses includes - CHF Labs and images were reviewed. Patient Has recent ABG, which has been reviewed. Will treat underlying causes and adjust management of respiratory failure as follows- on mechanical ventilation; extubated on 06/11

## 2025-06-13 NOTE — PLAN OF CARE
Problem: Adult Inpatient Plan of Care  Goal: Plan of Care Review  Outcome: Progressing  Goal: Patient-Specific Goal (Individualized)  Outcome: Progressing  Goal: Optimal Comfort and Wellbeing  Outcome: Progressing     Problem: Wound  Goal: Optimal Coping  Outcome: Progressing  Goal: Optimal Functional Ability  Outcome: Progressing  Goal: Absence of Infection Signs and Symptoms  Outcome: Progressing  Goal: Optimal Pain Control and Function  Outcome: Progressing

## 2025-06-13 NOTE — ASSESSMENT & PLAN NOTE
- echo with EF 30-35%   - on BB and Hydralazine as an outpatient- held due to pressor use previously  - weaned off pressors and remains off; will start Hydralazine 25 BID today with hold parameters for SBP less than 120  - anticipate HD today; CXR today with slight improvement

## 2025-06-13 NOTE — PROGRESS NOTES
[facility] Medicine  Progress Note    Patient Name: Domingo Gross  MRN: [id number]  Patient Class: IP- Inpatient   Admission Date: 6/7/2025  Length of Stay: 6 days  Attending Physician: Juju Varela MD  Primary Care Provider: Geeta Coffey MD        Subjective     Principal Problem:Cardiac arrest        HPI:  64 y.o. male with PMH ESRD on HD, PAD with multiple interventions, CAD (mid LAD/prox RCA PCI 3/2019 and prox LCA in 10/2019 following out of hospital cardiac arrest), after cardiac arrest in setting of prox LCX occlusion treated with PCI, 9/16/2024 s/p intervention w cutting/shockwave PTCA to Lcx, 01/24/25 PCI of OM, new reduction in EF presents to ER with substernal chest pain that radiates to his throat. Report his chest pain feels like heaviness as if something is heavy sitting on his chest, started last night at rest, he took sublingual nitro which only slightly helped but he was still feeling it, today as he was having HD the pain got worse so he came to the ED.    Patient denies smoking or alcohol use        Overview/Hospital Course:  No notes on file    Interval History:     Patient crumbles in the unable to communicate appropriately.  Still points to chest for pain.  Hemoglobin less than 8.  Called wife to transfuse.    Review of Systems   Unable to perform ROS: Mental status change     Objective:     Vital Signs (Most Recent):  Temp: 98.4 °F (36.9 °C) (06/13/25 1600)  Pulse: 84 (06/13/25 1700)  Resp: 19 (06/13/25 1700)  BP: 133/63 (06/13/25 1700)  SpO2: 100 % (06/13/25 1700) Vital Signs (24h Range):  Temp:  [97.4 °F (36.3 °C)-98.7 °F (37.1 °C)] 98.4 °F (36.9 °C)  Pulse:  [] 84  Resp:  [13-32] 19  SpO2:  [97 %-100 %] 100 %  BP: (101-161)/(59-77) 133/63     Weight: 81.6 kg (179 lb 14.3 oz)  Body mass index is 25.81 kg/m².    Intake/Output Summary (Last 24 hours) at 6/13/2025 1752  Last data filed at 6/13/2025 1650  Gross per 24 hour   Intake 891.98 ml   Output  "600 ml   Net 291.98 ml         Physical Exam  Constitutional:       General: He is not in acute distress.  HENT:      Head: Normocephalic.      Right Ear: There is no impacted cerumen.      Left Ear: There is no impacted cerumen.      Mouth/Throat:      Pharynx: No oropharyngeal exudate or posterior oropharyngeal erythema.   Eyes:      General:         Right eye: No discharge.         Left eye: No discharge.   Cardiovascular:      Heart sounds: No murmur heard.  Pulmonary:      Effort: No respiratory distress.      Comments: Bipap machine on   Musculoskeletal:         General: No deformity.      Right lower leg: No edema.   Skin:     Coloration: Skin is not jaundiced.      Findings: No bruising.   Neurological:      Motor: No weakness.      Gait: Gait normal.   Psychiatric:         Mood and Affect: Mood normal.               Significant Labs: All pertinent labs within the past 24 hours have been reviewed.  Blood Culture: No results for input(s): "LABBLOO" in the last 48 hours.  BMP:   Recent Labs   Lab 06/13/25  0420         K 4.4      CO2 26   BUN 45*   CREATININE 4.4*   CALCIUM 8.4*   MG 2.1     CBC:   Recent Labs   Lab 06/12/25  0440 06/13/25  0420   WBC 8.90 8.25   HGB 7.3* 7.4*   HCT 21.8* 22.5*   PLT 96* 113*     CMP:   Recent Labs   Lab 06/12/25  0440 06/12/25  0753 06/12/25  1829 06/13/25  0420   * 135* 141 140   K 4.6 4.5 4.5 4.4    102 99 100   CO2 20* 21* 27 26    105 111* 102   BUN 56* 60* 32* 45*   CREATININE 6.1* 6.1* 3.6* 4.4*   CALCIUM 8.1* 8.2* 8.5* 8.4*   ALBUMIN 2.3*  --   --  2.4*   ANIONGAP 13 12 15 14       Significant Imaging: I have reviewed all pertinent imaging results/findings within the past 24 hours.  I have reviewed and interpreted all pertinent imaging results/findings within the past 24 hours.      Assessment & Plan  Cardiac arrest  Patient had cardiac arrest requiring ACLS x3 on 6/9  Currently intubated and mechanically ventilated  Hypothermia " protocol initiated, s/p rewarming  Remarkable improvement, following commands and tracking; off sedation  - extubated on 06/11.  Tolerating well  - Pulmonology and Cardiology following    Anoxic encephalopathy due to cardiac arrest    - currently intubated, following commands--> extubated on 06/11.  Doing well  - delirium precautions    NSTEMI (non-ST elevated myocardial infarction)  History of MI (myocardial infarction)    Patient with chest pain worsened while on HD  Trops are flat 0.14  EKG with no ischemic changes  Echo with worsening wall motion as per cardio  Seen by cardiology, plan was for Left heart cath 06/09 but patient had a cardiac arrest that morning; managing medically for now  - continue ASA/plavix/statin  - Cardiology following      Cardiogenic shock  This patient has shock. The type of shock is cardiogenic due to ischemic. The patient has the following evidence of shock: persistent hypotension and BRETT. The patient will be admitted to an intensive care unit, they will be treated with levophed; now de-escalated.  - cardiogenic shock has now resolved    HFrEF (heart failure with reduced ejection fraction)  - likely ischemic  - volume control with dialysis--> plan for 1L removal on 06/12    Repeat echo showed   Left Ventricle: The left ventricle is moderately dilated. Mildly increased wall thickness. There is eccentric hypertrophy. Regional wall motion abnormalities present. See diagram for wall motion findings. There is moderately reduced systolic function with a visually estimated ejection fraction of 35 - 40%. Quantitated ejection fraction is 38%. Unable to assess diastolic function due to poor image quality.    Right Ventricle: The right ventricle is normal in size Systolic function is normal. TAPSE is 2.3 cm.    Overall the study quality was technically difficult.  Paroxysmal atrial fibrillation  Patient has long standing persistent (>12 months) atrial fibrillation. Patient is currently in sinus  rhythm. [id number] Score: 1. The patients heart rate in the last 24 hours is as follows:  Pulse  Min: 78  Max: 140     Antiarrhythmics  amiodarone 360 mg/200 mL (1.8 mg/mL) infusion, Continuous, Intravenous    Anticoagulants  heparin (porcine) injection 5,000 Units, Every 8 hours, Subcutaneous    Plan  - Replete lytes with a goal of K>4, Mg >2  - Patient is anticoagulated, see medications listed above.  - Patient's afib is currently controlled    Acute respiratory failure with hypoxia  Patient with Hypoxic Respiratory failure which is Acute.  he is not on home oxygen. Supplemental oxygen was provided and noted- Oxygen Concentration (%):  [28-40] 28    .   Signs/symptoms of respiratory failure include- lethargy. Contributing diagnoses includes - CHF Labs and images were reviewed. Patient Has recent ABG, which has been reviewed. Will treat underlying causes and adjust management of respiratory failure as follows- on mechanical ventilation; extubated on 06/11    ESRD (end stage renal disease)   >>ASSESSMENT AND PLAN FOR ESRD (END STAGE RENAL DISEASE) WRITTEN ON 6/7/2025  5:21 PM BY ERICH FRAZIER MD    Creatine stable for now. BMP reviewed- noted Estimated Creatinine Clearance: 17.7 mL/min (A) (based on SCr of 4.4 mg/dL (H)). according to latest data. Based on current GFR, CKD stage is end stage.  Monitor UOP and serial BMP and adjust therapy as needed. Renally dose meds. Avoid nephrotoxic medications and procedures.    ESRD On HD,  Primary hypertension  Patient's blood pressure range in the last 24 hours was: BP  Min: 101/71  Max: 161/72.The patient's inpatient anti-hypertensive regimen is listed below:  Current Antihypertensives  hydrALAZINE tablet 25 mg, Every 12 hours, Oral    Plan  - BP is controlled, no changes needed to their regimen  - holding antihypertensives in setting of arrest/shock    Hyperlipidemia  -Continue statin  Anemia due to chronic kidney disease, on chronic dialysis  Anemia is likely due to  chronic disease due to ESRD. Most recent hemoglobin and hematocrit are listed below.  Recent Labs     06/11/25  0427 06/12/25  0440 06/13/25  0420   HGB 7.4* 7.3* 7.4*   HCT 20.8* 21.8* 22.5*     Plan  - Monitor serial CBC: Daily  - Transfuse PRBC if patient becomes hemodynamically unstable, symptomatic or H/H drops below 7/21.  - Patient has not received any PRBC transfusions to date. Pending transfusion on 06/13  - Patient's anemia is currently stable    Hypothyroidism  Cont levothyroxine     Atherosclerosis of native coronary artery of native heart with unstable angina pectoris  Patient with known CAD s/p stent placement, which is controlled Will continue ASA, Plavix, and Statin and monitor for S/Sx of angina/ACS. Continue to monitor on telemetry.   Prolonged Q-T interval on ECG  -continue to monitor    VTE Risk Mitigation (From admission, onward)           Ordered     heparin (porcine) injection 5,000 Units  Every 8 hours         06/11/25 1415     IP VTE HIGH RISK PATIENT  Once         06/07/25 1155     Place sequential compression device  Until discontinued         06/07/25 1155                    Discharge Planning   FLACO:      Code Status: Full Code   Medical Readiness for Discharge Date:   Discharge Plan A:  (to be determined)   Discharge Delays:  (Pending medical stability)        Critical care time spent on the evaluation and treatment of severe organ dysfunction, review of pertinent labs and imaging studies, discussions with consulting providers and discussions with patient/family: 35 minutes.    Please place Justification for DME        Juju Varela MD  Department of Hospital Medicine   [facility] - Intensive Care

## 2025-06-13 NOTE — ASSESSMENT & PLAN NOTE
Patient with chest pain worsened while on HD  Trops are flat 0.14  EKG with no ischemic changes  Echo with worsening wall motion as per cardio  Seen by cardiology, plan was for Left heart cath 06/09 but patient had a cardiac arrest that morning; managing medically for now  - continue ASA/plavix/statin  - Cardiology following

## 2025-06-13 NOTE — SUBJECTIVE & OBJECTIVE
"Interval History:     Patient crumbles in the unable to communicate appropriately.  Still points to chest for pain.  Hemoglobin less than 8.  Called wife to transfuse.    Review of Systems   Unable to perform ROS: Mental status change     Objective:     Vital Signs (Most Recent):  Temp: 98.4 °F (36.9 °C) (06/13/25 1600)  Pulse: 84 (06/13/25 1700)  Resp: 19 (06/13/25 1700)  BP: 133/63 (06/13/25 1700)  SpO2: 100 % (06/13/25 1700) Vital Signs (24h Range):  Temp:  [97.4 °F (36.3 °C)-98.7 °F (37.1 °C)] 98.4 °F (36.9 °C)  Pulse:  [] 84  Resp:  [13-32] 19  SpO2:  [97 %-100 %] 100 %  BP: (101-161)/(59-77) 133/63     Weight: 81.6 kg (179 lb 14.3 oz)  Body mass index is 25.81 kg/m².    Intake/Output Summary (Last 24 hours) at 6/13/2025 1752  Last data filed at 6/13/2025 1650  Gross per 24 hour   Intake 891.98 ml   Output 600 ml   Net 291.98 ml         Physical Exam  Constitutional:       General: He is not in acute distress.  HENT:      Head: Normocephalic.      Right Ear: There is no impacted cerumen.      Left Ear: There is no impacted cerumen.      Mouth/Throat:      Pharynx: No oropharyngeal exudate or posterior oropharyngeal erythema.   Eyes:      General:         Right eye: No discharge.         Left eye: No discharge.   Cardiovascular:      Heart sounds: No murmur heard.  Pulmonary:      Effort: No respiratory distress.      Comments: Bipap machine on   Musculoskeletal:         General: No deformity.      Right lower leg: No edema.   Skin:     Coloration: Skin is not jaundiced.      Findings: No bruising.   Neurological:      Motor: No weakness.      Gait: Gait normal.   Psychiatric:         Mood and Affect: Mood normal.               Significant Labs: All pertinent labs within the past 24 hours have been reviewed.  Blood Culture: No results for input(s): "LABBLOO" in the last 48 hours.  BMP:   Recent Labs   Lab 06/13/25  0420         K 4.4      CO2 26   BUN 45*   CREATININE 4.4*   CALCIUM 8.4* "   MG 2.1     CBC:   Recent Labs   Lab 06/12/25  0440 06/13/25  0420   WBC 8.90 8.25   HGB 7.3* 7.4*   HCT 21.8* 22.5*   PLT 96* 113*     CMP:   Recent Labs   Lab 06/12/25  0440 06/12/25  0753 06/12/25  1829 06/13/25  0420   * 135* 141 140   K 4.6 4.5 4.5 4.4    102 99 100   CO2 20* 21* 27 26    105 111* 102   BUN 56* 60* 32* 45*   CREATININE 6.1* 6.1* 3.6* 4.4*   CALCIUM 8.1* 8.2* 8.5* 8.4*   ALBUMIN 2.3*  --   --  2.4*   ANIONGAP 13 12 15 14       Significant Imaging: I have reviewed all pertinent imaging results/findings within the past 24 hours.  I have reviewed and interpreted all pertinent imaging results/findings within the past 24 hours.

## 2025-06-13 NOTE — EICU
Virtual ICU Quality Rounds    Admit Date: 6/7/2025  Hospital Day: 6    ICU Day: 4d 3h    24H Vital Sign Range:  Temp:  [97.5 °F (36.4 °C)-98.5 °F (36.9 °C)]   Pulse:  [76-96]   Resp:  [13-38]   BP: ()/(52-81)   SpO2:  [92 %-100 %]     VICU Surveillance Screening                    LDA reconciliation : Yes

## 2025-06-13 NOTE — ASSESSMENT & PLAN NOTE
Patient's blood pressure range in the last 24 hours was: BP  Min: 101/71  Max: 161/72.The patient's inpatient anti-hypertensive regimen is listed below:  Current Antihypertensives  hydrALAZINE tablet 25 mg, Every 12 hours, Oral    Plan  - BP is controlled, no changes needed to their regimen  - holding antihypertensives in setting of arrest/shock

## 2025-06-13 NOTE — ASSESSMENT & PLAN NOTE
Patient has long standing persistent (>12 months) atrial fibrillation. Patient is currently in sinus rhythm. [id number] Score: 1. The patients heart rate in the last 24 hours is as follows:  Pulse  Min: 78  Max: 140     Antiarrhythmics  amiodarone 360 mg/200 mL (1.8 mg/mL) infusion, Continuous, Intravenous    Anticoagulants  heparin (porcine) injection 5,000 Units, Every 8 hours, Subcutaneous    Plan  - Replete lytes with a goal of K>4, Mg >2  - Patient is anticoagulated, see medications listed above.  - Patient's afib is currently controlled

## 2025-06-13 NOTE — PLAN OF CARE
Care Plan    VSS. Pt is alert and oriented to self and place, able to track and follow commands. SR on monitor. On bipap at night. HD pt, pt oliguric. PRN Hydromorphone x1. AM lab results pending. BG Q6. Report given to AM RN. See below and flowsheets for full assessment and VS info.      Neuro:  [place] Coma Scale  Best Eye Response: 4-->(E4) spontaneous  Best Motor Response: 6-->(M6) obeys commands  Best Verbal Response: 5-->(V5) oriented  [place] Coma Scale Score: 15  Assessment Qualifiers: No eye obstruction present, Patient not sedated/intubated  Pupil PERRLA: yes  24 hr Temp:  [97.5 °F (36.4 °C)-98.5 °F (36.9 °C)]      CV:  Rhythm: normal sinus rhythm  DVT prophylaxis: VTE Core Measure: Pharmacological prophylaxis initiated/maintained    Resp:     Vent Mode: A/CMV-VC  Set Rate: 28 BPM  Oxygen Concentration (%): 28  Vt Set: 480 mL  PEEP/CPAP: 5 cmH20    GI/:  GI prophylaxis: yes  Diet/Nutrition Received: NPO  Last Bowel Movement: 06/12/25  Voiding Characteristics: oliguria, patient on hemodialysis, other (see comments) (condom cath)   Intake/Output Summary (Last 24 hours) at 6/13/2025 0527  Last data filed at 6/12/2025 1430  Gross per 24 hour   Intake --   Output 1700 ml   Net -1700 ml       Labs/Accuchecks:  Recent Labs   Lab 06/13/25  0420   WBC 8.25   RBC 2.59*   HGB 7.4*   HCT 22.5*   *      Recent Labs   Lab 06/09/25  0650 06/09/25  1513 06/12/25  1829   *   < > 141   K 3.9   < > 4.5   CO2 24   < > 27   CL 84*   < > 99   BUN 66*   < > 32*   CREATININE 8.5*   < > 3.6*   ALKPHOS 70  --   --    ALT 27  --   --    AST 55*  --   --    BILITOT 0.8  --   --     < > = values in this interval not displayed.      Recent Labs   Lab 06/09/25  0650 06/10/25  0416 06/11/25  0427   PROTIME  --    < > 12.1   INR  --    < > 1.1   APTT 32.7*  --   --     < > = values in this interval not displayed.      Recent Labs   Lab 06/09/25  0650 06/09/25  2354   *  --    TROPONINI 0.355* 1.670*        Electrolytes: N/A - electrolytes WDL  Accuchecks: Q6H    Gtts/LDAs:      Lines/Drains/Airways       Peripheral Intravenous Line  Duration                  Hemodialysis AV Fistula Left upper arm -- days         Peripheral IV - Single Lumen 06/07/25 1429 20 G Distal;Posterior;Right Forearm 5 days         Peripheral IV - Single Lumen 06/12/25 1816 20 G Anterior;Right Forearm <1 day                    Skin/Wounds  Bathing/Skin Care: electrode patches/site rotation (06/12/25 0900)  Wounds: No  Wound care consulted: No    Consults  Consults (From admission, onward)          Status Ordering Provider     Inpatient consult to Registered Dietitian/Nutritionist  Once        Provider:  (Not yet assigned)    Completed CATHERINE HERBERT     Inpatient consult to Anesthesiology  Once        Provider:  David Raygoza MD    Acknowledged DAVID RAYGOZA     Inpatient consult to Nephrology-[facility] ([facility] Arriola Nimkevych)  Once        Provider:  (Not yet assigned)    Acknowledged ERICH FRAZIER     Inpatient consult to Cardiology-[facility]  Once        Provider:  (Not yet assigned)    Completed BLAINE SHEEHAN JR

## 2025-06-13 NOTE — PROGRESS NOTES
[facility] - Intensive Care  Pulmonology  Progress Note    Patient Name: Domingo Gross  MRN: [id number]  Admission Date: 6/7/2025  Hospital Length of Stay: 6 days  Code Status: Full Code  Attending Provider: Juju Varela MD  Primary Care Provider: Geeta Coffey MD   Principal Problem: Cardiac arrest    Subjective:     HPI:  64 year old man with history of ESRD on HD, PAD, CAD status post PCI, cardiac arrest, heart failure that presents to the ED on 06/07 for evaluation of chest pain. Patient was admitted for NSTEMI and started on heparin drip and DAPT. Course was complicated by code blue this morning, no pulse. Patient was coded accordingly to ACLS protocol, ROSC in 15 mins. Patient was intubated. Patient was transferred to ICU. Post ROSC, pupils are dilated and patient posturing. Patient was started on TTM. Patient developed recurrent cardiac arrest x2 in the ICU and ROSC in less than 5 mins.    Overview/Hospital Course:  No notes on file    Interval History:   06/13: Patient remains extubated. This morning patient developed atrial fib with RVR. Patient was given IV metoprolol. Patient seen by cardiology and started on amiodarone. 1 unit PRBCs ordered with dialysis.    06/12: Patient is status post extubation on 06/11. Patient remains on BiPAP overnight. Trial off BiPAP this morning.       Objective:     Vital Signs (Most Recent):  Temp: 98.4 °F (36.9 °C) (06/13/25 1318)  Pulse: (!) 121 (06/13/25 1318)  Resp: (!) 23 (06/13/25 1318)  BP: 123/61 (06/13/25 1318)  SpO2: 100 % (06/13/25 1318) Vital Signs (24h Range):  Temp:  [97.4 °F (36.3 °C)-98.4 °F (36.9 °C)] 98.4 °F (36.9 °C)  Pulse:  [] 121  Resp:  [13-29] 23  SpO2:  [92 %-100 %] 100 %  BP: (101-173)/(59-81) 123/61     Weight: 81.6 kg (179 lb 14.3 oz)  Body mass index is 25.81 kg/m².      Intake/Output Summary (Last 24 hours) at 6/13/2025 1336  Last data filed at 6/13/2025 1318  Gross per 24 hour   Intake 350 ml   Output 1975 ml   Net -1625 ml         Physical Exam  Vitals reviewed.   Constitutional:       General: He is not in acute distress.     Appearance: Normal appearance. He is not ill-appearing.   HENT:      Head: Normocephalic and atraumatic.   Eyes:      Conjunctiva/sclera: Conjunctivae normal.   Cardiovascular:      Rate and Rhythm: Tachycardia present. Rhythm irregular.      Heart sounds: No murmur heard.  Pulmonary:      Effort: Pulmonary effort is normal. No respiratory distress.      Breath sounds: No wheezing or rhonchi.   Abdominal:      General: There is no distension.      Palpations: Abdomen is soft.   Musculoskeletal:      Right lower leg: No edema.      Left lower leg: No edema.   Neurological:      General: No focal deficit present.      Mental Status: He is alert.   Psychiatric:         Mood and Affect: Mood normal.         Behavior: Behavior normal.           Review of Systems    Vents:  Vent Mode: A/CMV-VC (06/11/25 1728)  Set Rate: 28 BPM (06/11/25 1728)  Vt Set: 480 mL (06/11/25 1728)  PEEP/CPAP: 5 cmH20 (06/11/25 1728)  Oxygen Concentration (%): 28 (06/13/25 0429)  Peak Airway Pressure: 37.8 cmH20 (06/11/25 1728)  Plateau Pressure: 18.8 cmH20 (06/11/25 1612)  Total Ve: 13.4 L/m (06/11/25 1728)  F/VT Ratio<105 (RSBI): (!) 58.46 (06/11/25 1728)    Lines/Drains/Airways       Peripheral Intravenous Line  Duration                  Hemodialysis AV Fistula Left upper arm -- days         Peripheral IV - Single Lumen 06/07/25 1429 20 G Distal;Posterior;Right Forearm 5 days         Peripheral IV - Single Lumen 06/12/25 1816 20 G Anterior;Right Forearm <1 day                    Significant Labs:    CBC/Anemia Profile:  Recent Labs   Lab 06/11/25  1427 06/12/25  0440 06/13/25  0420   WBC  --  8.90 8.25   HGB  --  7.3* 7.4*   HCT  --  21.8* 22.5*   PLT  --  96* 113*   MCV  --  84 87   RDW  --  15.9* 15.9*   FOLATE 3.7*  --   --    [id number] 221  --   --         Chemistries:  Recent Labs   Lab 06/12/25  0440 06/12/25  0753 06/12/25  1827  06/13/25  0420   * 135* 141 140   K 4.6 4.5 4.5 4.4    102 99 100   CO2 20* 21* 27 26   BUN 56* 60* 32* 45*   CREATININE 6.1* 6.1* 3.6* 4.4*   CALCIUM 8.1* 8.2* 8.5* 8.4*   ALBUMIN 2.3*  --   --  2.4*   MG 2.4 2.4 2.0 2.1   PHOS 5.7*  --   --  4.9*       All pertinent labs within the past 24 hours have been reviewed.    Significant Imaging:  I have reviewed all pertinent imaging results/findings within the past 24 hours.  Assessment/Plan:   Acute Encephalopathy  CT head showed enlargement of the superior ophthalmic vein bilaterally but there is no additional evidence for acute intracranial process   Likely due to anoxic brain injury  Status post TTM  Improved mentation  Delirium precautions     Cardiac arrest  Multiple cardiac arrest in patient with extensive CAD history  CAD  Shock, likely cardiogenic shock  Ventricular Tachycardia  TTE showed LVEF of 30-35%, global hypokinesis present with some regional variability, septal motion is consistent with bundle branch block     Off Levophed  Continue amiodarone  Cardiology following    Atrial fib with RVR  Continue amiodarone drip  Cardiology following    Acute Hypoxemia Respiratory Failure  CXR showed bilateral interstitial infiltrates  Pulmonary edema  Patient is status post extubation on 06/11 to BiPAP  Continue oxygen therapy during the day  BiPAP at night  Wean FiO2 as tolerated     Hypokalemia, improved  Continue to monitor     ESRD  Dialysis as needed  Nephrology following     Normocytic anemia  Due to folic acid, B12 def and CKD  B12 and folate acid repletion  Transfuse 1 unit RBCs today           .Feeding/fluids: Full liquid diet  Analgesia: Dilaudid as needed  Sedation: N/A  Thromboprophylaxis: Heparin  Head up position: Yes  Ulcer prophylaxis: N/A  Glycemic control: Yes  Spontaneous breathing trial: N/A  Bowel care: Yes  Indwelling catheter removal: Peripheral line, HD AV fistula left upper arm  Deescalation of antibiotics: N/A         Code:  Full        Disposition: Possible stepdown later today if remains stable.        Patient was seen with the attending physician MD Lizz Conner MD  Pulmonology  [facility] - Intensive Care    Pt seen and examined with Pulmonary/Critical Care team and this note was reviewed and validated with the following additional comments: Seems to have low energy today. Off pressors. Recurrent A-fib with RVR. Treated with metoprolol. Hemodynamics stable. No respiratory distress. No chest wall crepitance. Trial IHD.    Critical Care time was spent validating the history and physical exam, reviewing the lab and imaging results, and discussing the care of the patient with the bedside nurse and the patient and/or surrogates. This critical care time did not overlap with that of any other provider or involve time for any procedures.  This patient has a high probability of sudden clinically significant deterioration which requires the highest level of physician preparedness to intervene urgently. I managed/supervised life or organ supporting interventions that required frequent physician assessments. I devoted my full attention in the ICU to the direct care of this patient for this period of time. Organ systems which are failing and require intensive, critical care support are: cardiovascular. Respiratory, cardiac, renal, neurologic  Critical Care time: 45 minutes    Mickey Doe MD  Phone [phone]

## 2025-06-13 NOTE — PLAN OF CARE
Chart reviewed - case discussed in am MD meeting   Pt is s/p Cardiac Arrest - now extubated   Pt has been unable to work with physical therapy - d/c disposition pending pt's progress with therapy.   A fib with RVR - Amiodorone gtt.   CM to continue to monitor pt status to detemine d/c needs     CM called and spoke at length with wife Karmen [phone].  CM advised her that CM will help with pt's post discharge needs (placement vs Home with HH and dme).    Confirmed that pt gets HD on Tues and Sat - 10 am  at [facility].  Pt drives himself to dialysis.     Pt's wife is concerned as she doesn't drive and pt provided transportation for the family (19 yo grandson lives with pt).    s/p Cardiac Arrest   ESRD pt on HD - pt drove himself to ED due to CP experienced at dialysis.   Pt is now on O2 at 2l/min per NC      06/13/25 3966   Post-Acute Status   Post-Acute Authorization Other  (post acute needs to be determined)   Discharge Delays   (pending medical stability)   Discharge Plan   Discharge Plan A   (to be determined)

## 2025-06-13 NOTE — ASSESSMENT & PLAN NOTE
- extensive PCI history with LAD and RCA PCI 3/77569, LCX 2019, LAD REZA 2023, LCX PCI 5/2023, LCX PTCA lithotripsy 9/2024 LCX PTCA 1/2025  -  presented with chest pain with concern for USA/NSTEMI LHC deferred due to cardiac arrest  - plan for medical management for now with DAPT, statin; no BB due to marginal BP; chest pain  related to CPR and more MSK in etiology; will plan to continue medical management for now; no plans for invasive strategy; patient very debilitated and recovering from acute event

## 2025-06-13 NOTE — PROGRESS NOTES
[facility] - Intensive Care  Cardiology  Progress Note    Patient Name: Domingo Gross  MRN: [id number]  Admission Date: 6/7/2025  Hospital Length of Stay: 6 days  Code Status: Full Code   Attending Physician: Juju Varela MD   Primary Care Physician: Geeta Coffey MD  Expected Discharge Date:   Principal Problem:Cardiac arrest    Subjective:     Hospital Course:     Per Dr. Salazar consult note 6/7/2025 64 y.o. male with PMH ESRD on HD, PAD with multiple interventions, CAD (mid LAD/prox RCA PCI 3/2019 and prox LCA in 10/2019 following out of hospital cardiac arrest), after cardiac arrest in setting of prox LCX occlusion treated with PCI, 9/16/2024 s/p intervention w cutting/shockwave PTCA to Lcx, 01/24/25 PCI of OM, new reduction in EF presents to ER with substernal chest pain that radiates to his throat. Reports his chest pain started last night which did not improved with nitro. Trops with significant delta from baseline. Concerning symptoms given extensive cardiovascular history.   CAD with multiple interventions/ NSTEMI: start heparin gtt, hold eliquis, continue DAPT, statin, bb  HFrEF - continue BB, will continue to optimize GDMT   Afib per EP- holding eliquis, continue heparin   ESRD on HD- please consult nephro   -NPO Sunday MN FOR CATH+/-PCI Monday. If refractory chest pain despite meds contact me - low threshold for cath.   -Trend troponin    6/9/2025 Cardiac arrest with PEA with ACLS initiated and VTach as well. LHC cancelled   Per  Cardiac arrest- PEA ARREST multiples ACLS round. qTC >600ms .   Multiple cardiac arrest in patient with extensive CAD history  ESRD  NSTEMI   Plan:   -DAPT  -Levophed  prn to keep MAP > 65  -Continue DAPT (recent PCI of Lcx)  -HOLDING PO MEDS cautious with IV amiodarone meds   -Monitor Qtc and electrolytes  -Extubate on BiPAP per ICU team.   -Please contact me for any cardiac question     6/13/2025 HD yesterday with 1L removed. H&H down to 7.4&22.5 this AM  Recurrent afib with RVR this AM. BP stable overnight             Review of Systems   Unable to perform ROS: Other     Objective:     Vital Signs (Most Recent):  Temp: 98.4 °F (36.9 °C) (06/13/25 1600)  Pulse: 81 (06/13/25 1600)  Resp: 20 (06/13/25 1600)  BP: (!) 150/69 (06/13/25 1600)  SpO2: 100 % (06/13/25 1600) Vital Signs (24h Range):  Temp:  [97.4 °F (36.3 °C)-98.7 °F (37.1 °C)] 98.4 °F (36.9 °C)  Pulse:  [] 81  Resp:  [13-32] 20  SpO2:  [97 %-100 %] 100 %  BP: (101-161)/(59-77) 150/69     Weight: 81.6 kg (179 lb 14.3 oz)  Body mass index is 25.81 kg/m².     SpO2: 100 %         Intake/Output Summary (Last 24 hours) at 6/13/2025 1613  Last data filed at 6/13/2025 1600  Gross per 24 hour   Intake 687.5 ml   Output 475 ml   Net 212.5 ml       Lines/Drains/Airways       Peripheral Intravenous Line  Duration                  Hemodialysis AV Fistula Left upper arm -- days         Peripheral IV - Single Lumen 06/12/25 1816 20 G Anterior;Right Forearm <1 day         Peripheral IV - Single Lumen 06/13/25 1445 20 G Anterior;Right Wrist <1 day                       Physical Exam  Constitutional:       General: He is not in acute distress.     Appearance: He is well-developed. He is ill-appearing.   Neck:      Vascular: No JVD.   Cardiovascular:      Rate and Rhythm: Normal rate and regular rhythm.      Heart sounds: No murmur heard.     No gallop.   Pulmonary:      Effort: Pulmonary effort is normal. No respiratory distress.      Breath sounds: Normal breath sounds. No wheezing.   Abdominal:      General: Bowel sounds are normal. There is no distension.      Palpations: Abdomen is soft.      Tenderness: There is no abdominal tenderness.   Musculoskeletal:      Cervical back: Normal range of motion and neck supple.   Skin:     General: Skin is warm and dry.   Neurological:      Mental Status: He is alert and oriented to person, place, and time.   Psychiatric:         Behavior: Behavior normal.         Thought Content:  Thought content normal.         Judgment: Judgment normal.                Significant Imaging: Echocardiogram: Transthoracic echo (TTE) complete (Cupid Only):   Results for orders placed or performed during the hospital encounter of 06/07/25   Echo   Result Value Ref Range    BSA 2.01 m2    Child's Biplane MOD Ejection Fraction 33 %    A2C EF 25 %    A4C EF 37 %    LVIDd 4.4 3.5 - 6.0 cm    LV Systolic Volume 55 mL    LV Systolic Volume Index 27.5 mL/m2    LVIDs 3.6 2.1 - 4.0 cm    LV Diastolic Volume 89 mL    LV Diastolic Volume Index 44.50 mL/m2    LV EDV A2C [id number] mL    LV EDV A4C 109.71 mL    Left Ventricular End Systolic Volume by Teichholz Method 54.60 mL    Left Ventricular End Diastolic Volume by Teichholz Method 88.67 mL    IVS 0.9 0.6 - 1.1 cm    FS 18.2 (A) 28 - 44 %    Left Ventricle Relative Wall Thickness 0.50 cm    PW 1.1 0.6 - 1.1 cm    LV mass 147.8 g    LV Mass Index 73.9 g/m2    ZLVIDS 0.05     ZLVIDD -2.81     Narrative      Left Ventricle: The left ventricle is normal in size. Mildly increased   wall thickness. Global hypokinesis present with some regional variability.    Septal motion is consistent with bundle branch block. There is moderately   reduced systolic function with a visually estimated ejection fraction of   30 - 35%.    Right Ventricle: The right ventricle is normal in size Systolic   function is normal.       Assessment and Plan:       * Cardiac arrest  - PEA arrest with ACLS with ROSC; reports of VTach; loaded with IV Amiodarone and placed on IV Amiodarone drip  - transitioned to oral Amiodarone yesterday; QTC yesterday 627 down to 502 this morning  - will hold oral Amiodarone today and decide if resumption needed tomorrow   - monitor closely; maintain K+ >4.0 Mg >2.0    Prolonged Q-T interval on ECG  - EKG with  treated with Amiodarone due to ?VT  - Amiodarone held with improvement in QTC; recurrent afib with RVR Amidoarone resumed- will monitor QTC      NSTEMI (non-ST elevated myocardial infarction)  - presented with chest pain; initial troponin 0.1-0.3 with trend up to 1.9 after cardiac arrest  - history of CAD; originally planned for LHC but deferred given cardiac arrest; on ASA and Plasix as well as statin; previously on IV Heparin but held; no ARB or BB due to marginal BP  - will plan to continued GDMT for NSTEMI; no plans for LHC as of now unless acute changes noted on EKG    HFrEF (heart failure with reduced ejection fraction)  - echo with EF 30-35%   - on BB and Hydralazine as an outpatient- held due to pressor use previously  - weaned off pressors and remains off; will start Hydralazine 25 BID today with hold parameters for SBP less than 120  - anticipate HD today; CXR today with slight improvement     Atherosclerosis of native coronary artery of native heart with unstable angina pectoris  - extensive PCI history with LAD and RCA PCI 3/22168, LCX 2019, LAD REZA 2023, LCX PCI 5/2023, LCX PTCA lithotripsy 9/2024 LCX PTCA 1/2025  -  presented with chest pain with concern for USA/NSTEMI LHC deferred due to cardiac arrest  - plan for medical management for now with DAPT, statin; no BB due to marginal BP; chest pain  related to CPR and more MSK in etiology; will plan to continue medical management for now; no plans for invasive strategy; patient very debilitated and recovering from acute event     Paroxysmal atrial fibrillation  - history of PAF with EKG in 2023 with PAF; EKGs this admission with NSR; recurrent afib with RVR overnight  - will load with IV Amidoarone and place on Amiodarone drip; considered IV Heparin drip but will hold off given anemia; may start IV Heparin once H&H improves   - on Coreg and Eliquis as an outpatient- both on hold for now   - monitor on telemetry         VTE Risk Mitigation (From admission, onward)           Ordered     heparin (porcine) injection 5,000 Units  Every 8 hours         06/11/25 1415     IP VTE HIGH RISK PATIENT   Once         06/07/25 1155     Place sequential compression device  Until discontinued         06/07/25 1155                    Marce Lofton APRN, ANP  Cardiology  [facility] - Intensive Care

## 2025-06-13 NOTE — EICU
Intervention Initiated From:  [facility] / [facility]    Margie intervened regarding:  Rounding (Video assessment)    VICU Night Rounds Checklist  24H Vital Sign Range:  Temp:  [97.5 °F (36.4 °C)-98.5 °F (36.9 °C)]   Pulse:  [74-96]   Resp:  [13-38]   BP: ()/(50-93)   SpO2:  [92 %-100 %]     Video rounds and LDA reconciliation

## 2025-06-13 NOTE — SUBJECTIVE & OBJECTIVE
Review of Systems   Unable to perform ROS: Other     Objective:     Vital Signs (Most Recent):  Temp: 98.4 °F (36.9 °C) (06/13/25 1600)  Pulse: 81 (06/13/25 1600)  Resp: 20 (06/13/25 1600)  BP: (!) 150/69 (06/13/25 1600)  SpO2: 100 % (06/13/25 1600) Vital Signs (24h Range):  Temp:  [97.4 °F (36.3 °C)-98.7 °F (37.1 °C)] 98.4 °F (36.9 °C)  Pulse:  [] 81  Resp:  [13-32] 20  SpO2:  [97 %-100 %] 100 %  BP: (101-161)/(59-77) 150/69     Weight: 81.6 kg (179 lb 14.3 oz)  Body mass index is 25.81 kg/m².     SpO2: 100 %         Intake/Output Summary (Last 24 hours) at 6/13/2025 1613  Last data filed at 6/13/2025 1600  Gross per 24 hour   Intake 687.5 ml   Output 475 ml   Net 212.5 ml       Lines/Drains/Airways       Peripheral Intravenous Line  Duration                  Hemodialysis AV Fistula Left upper arm -- days         Peripheral IV - Single Lumen 06/12/25 1816 20 G Anterior;Right Forearm <1 day         Peripheral IV - Single Lumen 06/13/25 1445 20 G Anterior;Right Wrist <1 day                       Physical Exam  Constitutional:       General: He is not in acute distress.     Appearance: He is well-developed. He is ill-appearing.   Neck:      Vascular: No JVD.   Cardiovascular:      Rate and Rhythm: Normal rate and regular rhythm.      Heart sounds: No murmur heard.     No gallop.   Pulmonary:      Effort: Pulmonary effort is normal. No respiratory distress.      Breath sounds: Normal breath sounds. No wheezing.   Abdominal:      General: Bowel sounds are normal. There is no distension.      Palpations: Abdomen is soft.      Tenderness: There is no abdominal tenderness.   Musculoskeletal:      Cervical back: Normal range of motion and neck supple.   Skin:     General: Skin is warm and dry.   Neurological:      Mental Status: He is alert and oriented to person, place, and time.   Psychiatric:         Behavior: Behavior normal.         Thought Content: Thought content normal.         Judgment: Judgment normal.                 Significant Imaging: Echocardiogram: Transthoracic echo (TTE) complete (Cupid Only):   Results for orders placed or performed during the hospital encounter of 06/07/25   Echo   Result Value Ref Range    BSA 2.01 m2    Child's Biplane MOD Ejection Fraction 33 %    A2C EF 25 %    A4C EF 37 %    LVIDd 4.4 3.5 - 6.0 cm    LV Systolic Volume 55 mL    LV Systolic Volume Index 27.5 mL/m2    LVIDs 3.6 2.1 - 4.0 cm    LV Diastolic Volume 89 mL    LV Diastolic Volume Index 44.50 mL/m2    LV EDV A2C [id number] mL    LV EDV A4C 109.71 mL    Left Ventricular End Systolic Volume by Teichholz Method 54.60 mL    Left Ventricular End Diastolic Volume by Teichholz Method 88.67 mL    IVS 0.9 0.6 - 1.1 cm    FS 18.2 (A) 28 - 44 %    Left Ventricle Relative Wall Thickness 0.50 cm    PW 1.1 0.6 - 1.1 cm    LV mass 147.8 g    LV Mass Index 73.9 g/m2    ZLVIDS 0.05     ZLVIDD -2.81     Narrative      Left Ventricle: The left ventricle is normal in size. Mildly increased   wall thickness. Global hypokinesis present with some regional variability.    Septal motion is consistent with bundle branch block. There is moderately   reduced systolic function with a visually estimated ejection fraction of   30 - 35%.    Right Ventricle: The right ventricle is normal in size Systolic   function is normal.

## 2025-06-13 NOTE — PROGRESS NOTES
Nephrology Consult   Note     Consult Requested By: Juju Varela MD  Reason for Consult: ESRD     SUBJECTIVE:     ?    Review of Systems   Constitutional:  Negative for chills and fever.   Respiratory:  Negative for cough and shortness of breath.    Cardiovascular:  Negative for chest pain and leg swelling.   Gastrointestinal:  Negative for nausea.     A little bit less active today than yesterday but per nursing staff was quite alert and fully responsive and cooperative this morning         OBJECTIVE:     Vital Signs (Most Recent)  Vitals:    06/13/25 1100 06/13/25 1104 06/13/25 1115 06/13/25 1130   BP: 124/70 124/70 116/71 116/67   BP Location: Right arm Right arm Right arm Right arm   Patient Position: Lying Lying Lying Lying   Pulse: (!) 116 (!) 112 (!) 118 (!) 119   Resp: 19 (!) 29 19 19   Temp:   97.4 °F (36.3 °C)    TempSrc:   Axillary    SpO2: 100% 100% 100% 100%   Weight:       Height:             Date 06/13/25 0700 - 06/14/25 0659   Shift 6808-2544 0052-0471 2971-0067 24 Hour Total   INTAKE   Shift Total(mL/kg)       OUTPUT   Urine(mL/kg/hr) 475   475   Shift Total(mL/kg) 475(5.8)   475(5.8)   Weight (kg) 81.6 81.6 81.6 81.6             Medications:   aspirin  81 mg Oral Daily    atorvastatin  40 mg Oral QHS    clopidogreL  75 mg Oral Daily    cyanocobalamin  1,000 mcg Subcutaneous Daily    folic acid  1 mg Oral Daily    heparin (porcine)  5,000 Units Subcutaneous Q8H    hydrALAZINE  25 mg Oral Q12H    levothyroxine  75 mcg Oral Before breakfast    perflutren protein-a microsphr  0.5 mL Intravenous Once    sevelamer carbonate  1,600 mg Oral TID WM             Physical Exam  Vitals and nursing note reviewed.   Constitutional:       General: He is not in acute distress.     Appearance: He is not diaphoretic.   HENT:      Head: Normocephalic and atraumatic.      Mouth/Throat:      Pharynx: No oropharyngeal exudate.   Eyes:      General: No scleral icterus.     Conjunctiva/sclera: Conjunctivae normal.       Pupils: Pupils are equal, round, and reactive to light.   Cardiovascular:      Rate and Rhythm: Normal rate and regular rhythm.      Heart sounds: Normal heart sounds. No murmur heard.  Pulmonary:      Effort: Pulmonary effort is normal. No respiratory distress.      Breath sounds: No rales.   Abdominal:      General: Bowel sounds are normal. There is no distension.      Palpations: Abdomen is soft.      Tenderness: There is no abdominal tenderness.   Musculoskeletal:         General: Normal range of motion.      Cervical back: Normal range of motion and neck supple.      Right lower leg: No edema.      Left lower leg: No edema.      Comments: PEE ANN    Skin:     General: Skin is warm and dry.      Findings: No erythema.   Neurological:      Mental Status: He is alert and oriented to person, place, and time.      Cranial Nerves: No cranial nerve deficit.      Comments:     Psychiatric:         Mood and Affect: Affect normal.         Cognition and Memory: Memory normal.         Judgment: Judgment normal.         Laboratory:  Recent Labs   Lab 06/11/25  0427 06/12/25  0440 06/13/25  0420   WBC 7.72 8.90 8.25   HGB 7.4* 7.3* 7.4*   HCT 20.8* 21.8* 22.5*   * 96* 113*   MONO 7.3  0.56 8.0  0.71 10.1  0.83     Recent Labs   Lab 06/11/25  0747 06/11/25  1222 06/12/25  0440 06/12/25  0753 06/12/25  1829 06/13/25  0420   *   < > 134* 135* 141 140   K 3.2*   < > 4.6 4.5 4.5 4.4   CL 86*   < > 101 102 99 100   CO2 27   < > 20* 21* 27 26   *   < > 56* 60* 32* 45*   CREATININE 9.5*   < > 6.1* 6.1* 3.6* 4.4*   CALCIUM 7.5*   < > 8.1* 8.2* 8.5* 8.4*   PHOS 8.4*  --  5.7*  --   --  4.9*    < > = values in this interval not displayed.       Diagnostic Results:  X-Ray: Reviewed  US: Reviewed  Echo: Reviewed    Left Ventricle: The left ventricle is mildly dilated. There is eccentric hypertrophy. Regional wall motion abnormalities present. See diagram for wall motion findings. There is mildly reduced  systolic function with a visually estimated ejection fraction of 45 - 50%. There is indeterminate diastolic function.Elevated left ventricular filling pressure.    Right Ventricle: Normal right ventricular cavity size. Wall thickness is normal. Systolic function is normal.    Left Atrium: Left atrium is mildly dilated.    Right Atrium: Right atrium is mildly dilated.    Mitral Valve: There is mild to moderate regurgitation.    Pulmonic Valve: There is moderate regurgitation.    Pulmonary Artery: There is pulmonary hypertension. The estimated pulmonary artery systolic pressure is 47 mmHg.    IVC/SVC: Intermediate venous pressure at 8 mmHg.  ASSESSMENT/PLAN:     ESRD on HD TTS with Dr Loly Payne  in [facility]      Status post cardiac arrest.  Doing excellent considering what he went through.    Did not dialyze on Tuesday were too unstable.   Tolerated dialysis well on Wednesday and yesterday.  Resume her regular Tuesday Thursday Saturday schedule, plan for dialysis tomorrow    Patient with a history of persistent hypokalemia.  But when his cardiac arrest has been his potassium was 3.9.  Both days on Wednesday and yesterday patient ran on 4K bath  Potassium today 4.4.  Magnesium is 2.1.  Had episode of AFib with RVR today.  Unlikely to be related to electrolyte disturbances    Appreciate cardiology help.  Currently on amiodarone.  Remains in ICU      2. HTN    was hypotensive after cardiac arrest, all of the blood pressure medications on hold.  Levophed was weaned from yesterday    3. 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  Hold Epogen in the settings of the cardiac arrest  with underlying CAD  Recent Labs   Lab 06/11/25  0427 06/12/25  0440 06/13/25  0420   HGB 7.4* 7.3* 7.4*   HCT 20.8* 21.8* 22.5*   * 96* 113*       Iron   Lab Results   Component Value Date    IRON 63 06/09/2025    TIBC 232 (L) 06/09/2025    FERRITIN 2,942.0 (H) 06/11/2025       4. MBD - PTH  at goal     Sevelamer with each meal- Resume  Ergocalciferol 47975 units weekly    Recent Labs   Lab 06/12/25  0753 06/12/25  1829 06/13/25  0420   MG 2.4 2.0 2.1       Lab Results   Component Value Date    .8 (H) 06/04/2025    CALCIUM 8.4 (L) 06/13/2025    CAION 1.31 07/14/2020    PHOS 4.9 (H) 06/13/2025     Lab Results   Component Value Date    LTHTLESF11XQ 9 (L) 03/14/2024     Acidosis - correct with HD   Lab Results   Component Value Date    CO2 26 06/13/2025     Hemodialysis access-left upper arm loop AV graft.     Nutrition/Hypoalbuminemia    Recent Labs   Lab 06/12/25  0440 06/13/25  0420   ALBUMIN 2.3* 2.4*     Nepro with meals TID.       Thank you for the consult, will follow  With any question please call [phone]  Alexia Yarbrough MD    [facility]    ESPERANZA Payne MD,   MD BRANDY Arredondo MD E. V. Harmon, NP    [address]  [place], [address] [phone]

## 2025-06-13 NOTE — SUBJECTIVE & OBJECTIVE
Interval History:   06/13: Patient remains extubated. This morning patient developed atrial fib with RVR. Patient was given IV metoprolol. Patient seen by cardiology and started on amiodarone. 1 unit PRBCs ordered with dialysis.    06/12: Patient is status post extubation on 06/11. Patient remains on BiPAP overnight. Trial off BiPAP this morning.       Objective:     Vital Signs (Most Recent):  Temp: 98.4 °F (36.9 °C) (06/13/25 1318)  Pulse: (!) 121 (06/13/25 1318)  Resp: (!) 23 (06/13/25 1318)  BP: 123/61 (06/13/25 1318)  SpO2: 100 % (06/13/25 1318) Vital Signs (24h Range):  Temp:  [97.4 °F (36.3 °C)-98.4 °F (36.9 °C)] 98.4 °F (36.9 °C)  Pulse:  [] 121  Resp:  [13-29] 23  SpO2:  [92 %-100 %] 100 %  BP: (101-173)/(59-81) 123/61     Weight: 81.6 kg (179 lb 14.3 oz)  Body mass index is 25.81 kg/m².      Intake/Output Summary (Last 24 hours) at 6/13/2025 1336  Last data filed at 6/13/2025 1318  Gross per 24 hour   Intake 350 ml   Output 1975 ml   Net -1625 ml        Physical Exam  Vitals reviewed.   Constitutional:       General: He is not in acute distress.     Appearance: Normal appearance. He is not ill-appearing.   HENT:      Head: Normocephalic and atraumatic.   Eyes:      Conjunctiva/sclera: Conjunctivae normal.   Cardiovascular:      Rate and Rhythm: Tachycardia present. Rhythm irregular.      Heart sounds: No murmur heard.  Pulmonary:      Effort: Pulmonary effort is normal. No respiratory distress.      Breath sounds: No wheezing or rhonchi.   Abdominal:      General: There is no distension.      Palpations: Abdomen is soft.   Musculoskeletal:      Right lower leg: No edema.      Left lower leg: No edema.   Neurological:      General: No focal deficit present.      Mental Status: He is alert.   Psychiatric:         Mood and Affect: Mood normal.         Behavior: Behavior normal.           Review of Systems    Vents:  Vent Mode: A/CMV-VC (06/11/25 1728)  Set Rate: 28 BPM (06/11/25 1728)  Vt Set: 480 mL  (06/11/25 1728)  PEEP/CPAP: 5 cmH20 (06/11/25 1728)  Oxygen Concentration (%): 28 (06/13/25 0429)  Peak Airway Pressure: 37.8 cmH20 (06/11/25 1728)  Plateau Pressure: 18.8 cmH20 (06/11/25 1612)  Total Ve: 13.4 L/m (06/11/25 1728)  F/VT Ratio<105 (RSBI): (!) 58.46 (06/11/25 1728)    Lines/Drains/Airways       Peripheral Intravenous Line  Duration                  Hemodialysis AV Fistula Left upper arm -- days         Peripheral IV - Single Lumen 06/07/25 1429 20 G Distal;Posterior;Right Forearm 5 days         Peripheral IV - Single Lumen 06/12/25 1816 20 G Anterior;Right Forearm <1 day                    Significant Labs:    CBC/Anemia Profile:  Recent Labs   Lab 06/11/25  1427 06/12/25  0440 06/13/25  0420   WBC  --  8.90 8.25   HGB  --  7.3* 7.4*   HCT  --  21.8* 22.5*   PLT  --  96* 113*   MCV  --  84 87   RDW  --  15.9* 15.9*   FOLATE 3.7*  --   --    [id number] 221  --   --         Chemistries:  Recent Labs   Lab 06/12/25  0440 06/12/25  0753 06/12/25  1829 06/13/25  0420   * 135* 141 140   K 4.6 4.5 4.5 4.4    102 99 100   CO2 20* 21* 27 26   BUN 56* 60* 32* 45*   CREATININE 6.1* 6.1* 3.6* 4.4*   CALCIUM 8.1* 8.2* 8.5* 8.4*   ALBUMIN 2.3*  --   --  2.4*   MG 2.4 2.4 2.0 2.1   PHOS 5.7*  --   --  4.9*       All pertinent labs within the past 24 hours have been reviewed.    Significant Imaging:  I have reviewed all pertinent imaging results/findings within the past 24 hours.

## 2025-06-13 NOTE — PT/OT/SLP PROGRESS
Speech Language Pathology Treatment    Patient Name:  Domingo Gross   MRN:  [id number]  Admitting Diagnosis: Cardiac arrest    Recommendations:                 General Recommendations:  ongoing swallow study   Diet recommendations:  NPO, Liquid Diet Level: Full liquids   Aspiration Precautions: upright for PO, frequent oral care, alternate sips and bites, make sure he swallows the bite before the next bite, whole meds one by one   General Precautions: Standard, fall, respiratory  Communication strategies:  redirect, repeat info, delayed responses     Assessment:     Domingo Gross is a 64 y.o. male admitted with cardiac arrest, was intubated who presents with impaired cognition, impaired attention, weak voice and generalized weakness.     Pt may start FULL liquid diet but must be awake and alert when consuming trials.      CT of the head: 6/9: The ventricular system, sulcal pattern and parenchymal attenuation characteristics are consistent with chronic change.  There is no evidence for intracranial mass, mass effect or midline shift and there is no evidence for acute intracranial hemorrhage.  Appropriate CSF spaces are seen at the skull base.   Subjective     Pt seen at bedside for repeat swallow study.  No family in room. RN did ok for therapy, reports that he needs take meds one by one.   Patient goals: none stated, pt mumbling at times but did not appear to make sense     Pain/Comfort:  Pain Rating 1: 0/10    Respiratory Status: NC    Objective:     Has the patient been evaluated by SLP for swallowing?   Yes  Keep patient NPO? No         Swallowing: Pt seen in ICU, he is awake, seated upright in his bed, has one leg barely in the bed. Pt needed verbal redirection and tactile cues to place his leg back into the bed. Pt with periods of drifting off with eyes opening. Pt needed continued cues to attend to clinician and in following basic commands. Pt with occasional intelligible yes/no response when asked a ?.    Pt did agree to trials of water. Pt able to tolerate ice chips, sips of water, some trouble siphoning water from the straw and consumed bites of pudding- all fed by SLP. Pt made no attempts to hold cup or take spoon to feed self. Pt with fair tolerance overall, no wet voice or coughing appreciated on trials. Pt able to clear oral cavity with no residuals noted.   Pt safe to advance to FULL liquids with assistance and 1:1 feeding for all meals.     Communication: Pt with mumbling/garbled speech as if he is not really attempting to articulate or over-articulate words when expressing self. Pt unable to answer basic orientation ?s, only stated his name with multiple prompts and provided time to respond. Pt continues with delayed responses, spaces out at times and does not make direct eye contact at this time. Will continue to assess daily and update goals as able.  Notified team of above results.       Goals:   Multidisciplinary Problems       SLP Goals          Problem: SLP    Goal Priority Disciplines Outcome   SLP Goal     SLP Progressing   Description: Short Term Goals:  1. Ongoing swallow eval to determine safest diet level.   2. Pt tolerate FULL liquids with no audible dysphagia signs.                            Plan:     Patient to be seen:  3 x/week   Plan of Care expires:  07/11/25  Plan of Care reviewed with:  patient   SLP Follow-Up:  Yes       Discharge recommendations:   (TBD)   Barriers to Discharge:  medical acuity     Time Tracking:     SLP Treatment Date:   06/13/25  Speech Start Time:  1300  Speech Stop Time:  1320     Speech Total Time (min):  20 min    Billable Minutes: Treatment Swallowing Dysfunction 20 06/13/2025

## 2025-06-13 NOTE — ASSESSMENT & PLAN NOTE
- history of PAF with EKG in 2023 with PAF; EKGs this admission with NSR; recurrent afib with RVR overnight  - will load with IV Amidoarone and place on Amiodarone drip; considered IV Heparin drip but will hold off given anemia; may start IV Heparin once H&H improves   - on Coreg and Eliquis as an outpatient- both on hold for now   - monitor on telemetry

## 2025-06-13 NOTE — PROGRESS NOTES
[facility] - Intensive Care  Adult Nutrition  Consult Note    SUMMARY     Recommendations    1.  Advance oral diet to renal diet, if appropriate as tolerated   If unable to advance oral diet, consider   Enteral Nutrition Management:   Tube feeding recommendations of Novasource Renal with goal rate of 40ml/hr.     -start TF at rate of 10ml/hr, increase by 10mls q 8 hours until goal rate of 40ml/hr is reached as tolerated.      TF to provide: 1920kcals, 87gPRO, 176gCHO, 688ml h2o     Flush with 60mls q 6 hours or per md order         2. Monitor labs and propofol kcals     3. Collaboration by nutrition professional with other providers    Goals: Patient to receive/consume >75% of EEN prior to RD follow up  Nutrition Goal Status: new  Communication of RD Recs: other (comment) (Consult, poc)    Nutrition Discharge Planning     Nutrition Discharge Planning: Too early to determine, pending clinical course    Assessment and Plan    Nutrition Problem  Increased nutrient needs    Related to (etiology):   ESRD   Decreased ability to consume sufficient protein and energy     Signs and Symptoms (as evidenced by):   Hemodialysis   NPO    Interventions (treatment strategy):  Enteral Nutrition Management   Mineral Modified diet   Collaboration by nutrition professional with other providers    Nutrition Diagnosis Status:   New         Malnutrition Assessment           NFPE to be performed at follow up visit as warranted.                             Reason for Assessment    Reason For Assessment: consult, new tube feeding  Diagnosis:  (cardiac arrest, NSTEMI)  Patient Active Problem List   Diagnosis    Primary hypertension    PAD (peripheral artery disease)    Claudication in peripheral vascular disease    DJD (degenerative joint disease), lumbar    Hyperlipidemia    Atherosclerosis of native artery of both lower extremities with intermittent claudication    Venous insufficiency of both lower extremities    Bilateral carotid artery  stenosis    Cardiac arrest    Nephrotic range proteinuria    Nuclear sclerosis, bilateral    COPD (chronic obstructive pulmonary disease)    Anemia due to chronic kidney disease, on chronic dialysis    Hypothyroidism    Paroxysmal atrial fibrillation    ESRD (end stage renal disease)    Membranous nephropathy determined by biopsy    S/P arteriovenous (AV) graft placement    Hemodialysis catheter dysfunction    History of MI (myocardial infarction)    Chronic anticoagulation    S/P drug eluting coronary stent placement    Atherosclerosis of native coronary artery of native heart with unstable angina pectoris    Hyperparathyroidism    Insomnia    Hypokalemia    Elevated troponin    Thrombocytopenia, unspecified    HFrEF (heart failure with reduced ejection fraction)    Acute hypoxemic respiratory failure    NSTEMI (non-ST elevated myocardial infarction)    Cardiogenic shock    Anoxic encephalopathy due to cardiac arrest    Acute respiratory failure with hypoxia    Prolonged Q-T interval on ECG     Past Medical History:   Diagnosis Date    Acute congestive heart failure 09/13/2024    Allergy     Anemia     Anticoagulant long-term use     Arthritis     CKD (chronic kidney disease) stage 4, GFR 15-29 ml/min     Closed fracture of transverse process of lumbar vertebra 02/16/2024    COPD (chronic obstructive pulmonary disease) 08/20/2021    Coronary artery disease     Heart attack 10/04/2019    Hematuria     Hemothorax     Hyperlipidemia     Hypertension     Hyperuricemia     Hypocalcemia     Hypokalemia     Hypophosphatemia     Hypothyroidism 03/02/2023    PAD (peripheral artery disease)     Proteinuria     Vitamin D deficiency        General Information Comments:   6/11/25: Patient is currently NPO, on vent, on propofol with OG tube in place.  Patient admitted with NSTEMI and cardiac arrest.  RD consulted for TF recommendations, recommendations are provided above and in sticky notes.  PMH of ESRD on HD, PAD, COPD.  Labs  "reviewed.  NKFA.  DNR status.  RD to continue to monitor EN support tolerance at follow up visits and adjust recommendations accordingly.    6/13/25 Follow up: Patient continue with NPO status at this time.  Patient was extubated on 6/11/2025 and continues on bipap.  TF recommendations remain in above recommendations and sticky notes if NPO status is unable to advance.  Labs reviewed.  NKFA.  LBM: 6/12/25.  RD to continue to monitor NPO status and progression of nutrition therapy interventions at follow up visits.     Nutrition/Diet History    Nutrition Related Social Determinants of Health: SDOH: Unable to assess at this time.     Spiritual, Cultural Beliefs, Restoration Practices, Values that Affect Care: no  Food Allergies: NKFA  Factors Affecting Nutritional Intake: NPO    Anthropometrics    Height: 5' 10" (177.8 cm)  Height (inches): 70 in  Height Method: Stated  Weight: 81.6 kg (179 lb 14.3 oz)  Weight (lb): 179.9 lb  Weight Method: Standard Scale  Ideal Body Weight (IBW), Male: 166 lb  % Ideal Body Weight, Male (lb): 108.37 %  BMI (Calculated): 25.8  BMI Grade: 25 - 29.9 - overweight     Wt Readings from Last 10 Encounters:   06/11/25 81.6 kg (179 lb 14.3 oz)   06/04/25 81.3 kg (179 lb 3.7 oz)   06/03/25 81.6 kg (180 lb)   06/03/25 81.9 kg (180 lb 8.9 oz)   05/27/25 81.9 kg (180 lb 8.9 oz)   04/30/25 82.6 kg (182 lb)   01/28/25 78.8 kg (173 lb 11.6 oz)   01/24/25 72.5 kg (159 lb 13.3 oz)   12/16/24 81.2 kg (179 lb 0.2 oz)   11/12/24 78.9 kg (173 lb 15.1 oz)       Lab/Procedures/Meds    Pertinent Labs Reviewed: reviewed  BMP  Lab Results   Component Value Date     06/13/2025    K 4.4 06/13/2025     06/13/2025    CO2 26 06/13/2025    BUN 45 (H) 06/13/2025    CREATININE 4.4 (H) 06/13/2025    CALCIUM 8.4 (L) 06/13/2025    ANIONGAP 14 06/13/2025    EGFRNORACEVR 14 (L) 06/13/2025     Lab Results   Component Value Date    HGBA1C 5.2 06/04/2025     Lab Results   Component Value Date    CALCIUM 8.4 (L) " 06/13/2025    PHOS 4.9 (H) 06/13/2025       Pertinent Medications Reviewed: reviewed  Scheduled Meds:   aspirin  81 mg Oral Daily    atorvastatin  40 mg Oral QHS    clopidogreL  75 mg Oral Daily    cyanocobalamin  1,000 mcg Subcutaneous Daily    folic acid  1 mg Oral Daily    heparin (porcine)  5,000 Units Subcutaneous Q8H    hydrALAZINE  25 mg Oral Q12H    levothyroxine  75 mcg Oral Before breakfast    perflutren protein-a microsphr  0.5 mL Intravenous Once    sevelamer carbonate  1,600 mg Oral TID WM     Continuous Infusions:   amiodarone in dextrose 5%  1 mg/min Intravenous Continuous 33.3 mL/hr at 06/13/25 0946 1 mg/min at 06/13/25 0946    amiodarone in dextrose 5%  0.5 mg/min Intravenous Continuous         PRN Meds:.  Current Facility-Administered Medications:     0.9%  NaCl infusion (for blood administration), , Intravenous, Q24H PRN    dextrose 50%, 12.5 g, Intravenous, PRN    dextrose 50%, 25 g, Intravenous, PRN    glucagon (human recombinant), 1 mg, Intramuscular, PRN    glucose, 16 g, Oral, PRN    glucose, 24 g, Oral, PRN    HYDROmorphone, 0.5 mg, Intravenous, Q4H PRN    hydrOXYzine pamoate, 25 mg, Oral, Nightly PRN    naloxone, 0.02 mg, Intravenous, PRN    oxyCODONE, 5 mg, Oral, Q6H PRN    sodium chloride 0.9%, 10 mL, Intravenous, Q12H PRN    Estimated/Assessed Needs    Weight Used For Calorie Calculations: 81.6 kg (179 lb 14.3 oz)  Energy Calorie Requirements (kcal): 25-35kcal/kg (2040-2856kcals/day)  Energy Need Method: Kcal/kg  Protein Requirements: 1.2-1.5g/kg (98-114g/day)  Weight Used For Protein Calculations: 81.6 kg (179 lb 14.3 oz)  Fluid Requirements (mL): 1500+output or per md order     RDA Method (mL): 25  CHO Requirement: 250      Nutrition Prescription Ordered    Current Diet Order: NPO  Current Nutrition Support Formula Ordered: Novasource Renal    Evaluation of Received Nutrient/Fluid Intake    % Kcal Needs: NPO  % Protein Needs: NPO  I/O: 6/13/25: -513ml since admit  Energy Calories  Required: not meeting needs  Protein Required: not meeting needs  Fluid Required: not meeting needs  Comments: LBM: 6/12/25  Tolerance:  (NPO)  % Intake of Estimated Energy Needs: 0 - 25 %  % Meal Intake: NPO    PES Statement  Increased nutrient needs related to Chronic illness as evidenced by NPO/CL status due to medical condition, Inability to advance PO diet  Status: Continues    Nutrition Risk    Level of Risk/Frequency of Follow-up: moderate - high (Follow up: 2x per week)       Monitor and Evaluation    Monitor and Evaluation: Energy intake, Protein intake, Diet order, Enteral and parenteral nutrition administration, Weight, Beliefs and attitudes, Electrolyte and renal panel, Gastrointestinal profile, Glucose/endocrine profile, Inflammatory profile, Lipid profile, Nutrition focused physical findings       Nutrition Follow-Up    RD Follow-up?: Yes  Gauri Vega, MS, RDN, LDN

## 2025-06-13 NOTE — PLAN OF CARE
Nutrition Plan of Care:    Recommendations     1.  Advance oral diet to renal diet, if appropriate as tolerated   If unable to advance oral diet, consider   Enteral Nutrition Management:   Tube feeding recommendations of Novasource Renal with goal rate of 40ml/hr.     -start TF at rate of 10ml/hr, increase by 10mls q 8 hours until goal rate of 40ml/hr is reached as tolerated.      TF to provide: 1920kcals, 87gPRO, 176gCHO, 688ml h2o     Flush with 60mls q 6 hours or per md order          2. Monitor labs and propofol kcals     3. Collaboration by nutrition professional with other providers     Goals: Patient to receive/consume >75% of EEN prior to RD follow up  Nutrition Goal Status: new  Communication of RD Recs: other (comment) (Consult, poc)     Nutrition Discharge Planning      Nutrition Discharge Planning: Too early to determine, pending clinical course     Assessment and Plan     Nutrition Problem  Increased nutrient needs     Related to (etiology):   ESRD   Decreased ability to consume sufficient protein and energy      Signs and Symptoms (as evidenced by):   Hemodialysis   NPO     Interventions (treatment strategy):  Enteral Nutrition Management   Mineral Modified diet   Collaboration by nutrition professional with other providers     Nutrition Diagnosis Status:   Maximus Vega, MS, RDN, LDN

## 2025-06-13 NOTE — ASSESSMENT & PLAN NOTE
Anemia is likely due to chronic disease due to ESRD. Most recent hemoglobin and hematocrit are listed below.  Recent Labs     06/11/25  0427 06/12/25  0440 06/13/25  0420   HGB 7.4* 7.3* 7.4*   HCT 20.8* 21.8* 22.5*     Plan  - Monitor serial CBC: Daily  - Transfuse PRBC if patient becomes hemodynamically unstable, symptomatic or H/H drops below 7/21.  - Patient has not received any PRBC transfusions to date. Pending transfusion on 06/13  - Patient's anemia is currently stable

## 2025-06-13 NOTE — ASSESSMENT & PLAN NOTE
- EKG with  treated with Amiodarone due to ?VT  - Amiodarone held with improvement in QTC; recurrent afib with RVR Amidoarone resumed- will monitor QTC

## 2025-06-13 NOTE — PT/OT/SLP PROGRESS
Physical Therapy      Patient Name:  Domingo Gross   MRN:  [id number]    Patient not seen today secondary to Nurse/ LACY hold - patient in Afib with RVR.  Will follow-up as appropriate.

## 2025-06-14 NOTE — PLAN OF CARE
Care Plan    Pt remains in ICU at this time. AAOX2. Became aggressive with staff overnight and was placed in restraints. NSR on telemetry, Amio infusing at 0.5mg. O2 sats WNL on 2L NC. 2 episodes of unmeasured urine. Refusing AM accu check. Refused BiPAP overnight.  POC reviewed with pt and family. Questions and concerns addressed. Safety and infection precautions in place. See below and flowsheets for full assessment and VS info.     Neuro:  [place] Coma Scale  Best Eye Response: 4-->(E4) spontaneous  Best Motor Response: 6-->(M6) obeys commands  Best Verbal Response: 4-->(V4) confused  Colin Coma Scale Score: 14  Assessment Qualifiers: Patient not sedated/intubated, No eye obstruction present  Pupil PERRLA: yes  24 hr Temp:  [97.4 °F (36.3 °C)-98.7 °F (37.1 °C)]      CV:  Rhythm: normal sinus rhythm  DVT prophylaxis: VTE Core Measure: Pharmacological prophylaxis initiated/maintained    Resp:     Vent Mode: A/CMV-VC  Set Rate: 28 BPM  Oxygen Concentration (%): 28  Vt Set: 480 mL  PEEP/CPAP: 5 cmH20    GI/:  GI prophylaxis: no  Diet/Nutrition Received: full liquid  Last Bowel Movement: 06/13/25  Voiding Characteristics: oliguria, patient on hemodialysis   Intake/Output Summary (Last 24 hours) at 6/14/2025 0722  Last data filed at 6/14/2025 0721  Gross per 24 hour   Intake 1254.01 ml   Output 600 ml   Net 654.01 ml       Labs/Accuchecks:  Recent Labs   Lab 06/14/25  0347   WBC 8.18   RBC 3.03*   HGB 8.5*   HCT 25.6*   *      Recent Labs   Lab 06/09/25  0650 06/09/25  1513 06/14/25  0347   *   < > 141   K 3.9   < > 4.0   CO2 24   < > 27   CL 84*   < > 101   BUN 66*   < > 67*   CREATININE 8.5*   < > 5.0*   ALKPHOS 70  --   --    ALT 27  --   --    AST 55*  --   --    BILITOT 0.8  --   --     < > = values in this interval not displayed.      Recent Labs   Lab 06/09/25  0650 06/10/25  0416 06/11/25  0427   PROTIME  --    < > 12.1   INR  --    < > 1.1   APTT 32.7*  --   --     < > = values in this  interval not displayed.      Recent Labs   Lab 06/09/25  0650 06/09/25  2354   *  --    TROPONINI 0.355* 1.670*       Electrolytes: N/A - electrolytes WDL  Accuchecks: Q6H    Gtts/LDAs:   amiodarone in dextrose 5%  0.5 mg/min Intravenous Continuous 16.7 mL/hr at 06/14/25 0721 0.5 mg/min at 06/14/25 0721       Lines/Drains/Airways       Peripheral Intravenous Line  Duration                  Hemodialysis AV Fistula Left upper arm -- days         Peripheral IV - Single Lumen 06/12/25 1816 20 G Anterior;Right Forearm 1 day         Peripheral IV - Single Lumen 06/13/25 1445 20 G Anterior;Right Wrist <1 day                    Skin/Wounds  Bathing/Skin Care: electrode patches/site rotation (06/13/25 0900)  Wounds: No  Wound care consulted: No    Consults  Consults (From admission, onward)          Status Ordering Provider     Inpatient consult to Registered Dietitian/Nutritionist  Once        Provider:  (Not yet assigned)    Completed CATHERINE HERBERT     Inpatient consult to Anesthesiology  Once        Provider:  David Raygoza MD    Acknowledged DAVID RAYGOZA     Inpatient consult to Nephrology-[facility] ([facility] Arriola Nimkevych)  Once        Provider:  (Not yet assigned)    Acknowledged ERICH FRAZIER     Inpatient consult to Cardiology-[facility]  Once        Provider:  (Not yet assigned)    Completed BLAINE SHEEHAN JR

## 2025-06-14 NOTE — PROGRESS NOTES
Pulmonary & Critical Care Medicine Progress Note    HPI:  64 year old man with history of ESRD on HD, PAD, CAD status post PCI, cardiac arrest, heart failure that presents to the ED on 06/07 for evaluation of chest pain. Patient was admitted for NSTEMI and started on heparin drip and DAPT. Course was complicated by code blue this morning, no pulse. Patient was coded accordingly to ACLS protocol, ROSC in 15 mins. Patient was intubated. Patient was transferred to ICU. Post ROSC, pupils are dilated and patient posturing. Patient was started on TTM. Patient developed recurrent cardiac arrest x2 in the ICU and ROSC in less than 5 mins.      Interval History:   Patient seen and examined at bedside.  Patient resting in bed comfortably in no acute distress.  Patient is alert and awake and oriented.  He is more alert and able to converse today.  Nursing reports No overnight events.  Patient is getting hemodialysis and tolerating well.  He continues to be on amiodarone drip for his AFib which appears to be stable at this time.Patient denies ay headache, fever, chills, chest pain, abd pain, nausea, vomiting, diarrhea, constipation, hemoptysis, unusual weight loss, or night sweats.       Vital Signs:   Vitals:    06/14/25 0715   BP: (!) 187/84   Pulse: 90   Resp: (!) 21   Temp:        Fluid Balance:     Intake/Output Summary (Last 24 hours) at 6/14/2025 0739  Last data filed at 6/14/2025 0721  Gross per 24 hour   Intake 1254.01 ml   Output 600 ml   Net 654.01 ml       Physical Exam:   Vitals reviewed.   Constitutional:       General: He is not in acute distress.     Appearance: Normal appearance. He is not ill-appearing.   HENT:      Head: Normocephalic and atraumatic.   Eyes:      Conjunctiva/sclera: Conjunctivae normal.   Cardiovascular:      Rate and Rhythm:  Normal rate present. Rhythm irregular.      Heart sounds: No murmur heard.  Pulmonary:      Effort: Pulmonary effort is normal. No respiratory distress.      Breath  "sounds: No wheezing or rhonchi.   Abdominal:      General: There is no distension.      Palpations: Abdomen is soft.   Musculoskeletal:      Right lower leg: No edema.      Left lower leg: No edema.   Neurological:      General: No focal deficit present.      Mental Status: He is alert.   Psychiatric:         Mood and Affect: Mood normal.         Behavior: Behavior normal.     Laboratory Studies:   No results for input(s): "PH", "PCO2", "PO2", "HCO3", "POCSATURATED", "BE" in the last 24 hours.  Recent Labs   Lab 06/14/25  0347   WBC 8.18   RBC 3.03*   HGB 8.5*   HCT 25.6*   *   MCV 85   MCH 28.1   MCHC 33.2     Recent Labs   Lab 06/14/25  0347      K 4.0      CO2 27   BUN 67*   CREATININE 5.0*   CALCIUM 8.5*       Microbiology Data:   Microbiology Results (last 7 days)       ** No results found for the last 168 hours. **             Chest Imaging:   No new imaging.     Infusions:     amiodarone in dextrose 5%  0.5 mg/min Intravenous Continuous 16.7 mL/hr at 06/14/25 0721 0.5 mg/min at 06/14/25 0721       Scheduled Medications:    aspirin  81 mg Oral Daily    atorvastatin  40 mg Oral QHS    clopidogreL  75 mg Oral Daily    cyanocobalamin  1,000 mcg Subcutaneous Daily    folic acid  1 mg Oral Daily    heparin (porcine)  5,000 Units Subcutaneous Q8H    hydrALAZINE  25 mg Oral Q12H    levothyroxine  75 mcg Oral Before breakfast    LIDOcaine  1 patch Transdermal Q24H    perflutren protein-a microsphr  0.5 mL Intravenous Once    sevelamer carbonate  1,600 mg Oral TID WM       PRN Medications:     Current Facility-Administered Medications:     0.9%  NaCl infusion (for blood administration), , Intravenous, Q24H PRN    0.9%  NaCl infusion (for blood administration), , Intravenous, Q24H PRN    dextrose 50%, 12.5 g, Intravenous, PRN    dextrose 50%, 25 g, Intravenous, PRN    glucagon (human recombinant), 1 mg, Intramuscular, PRN    glucose, 16 g, Oral, PRN    glucose, 24 g, Oral, PRN    HYDROmorphone, 0.5 " mg, Intravenous, Q4H PRN    naloxone, 0.02 mg, Intravenous, PRN    oxyCODONE, 5 mg, Oral, Q6H PRN    sodium chloride 0.9%, 10 mL, Intravenous, Q12H PRN    Assessment & Plan:   Acute Encephalopathy  CT head showed enlargement of the superior ophthalmic vein bilaterally but there is no additional evidence for acute intracranial process   Likely due to anoxic brain injury  Status post TTM  Improved mentation  Delirium precautions     Cardiac arrest  Multiple cardiac arrest in patient with extensive CAD history  CAD  Shock, likely cardiogenic shock  Ventricular Tachycardia  TTE showed LVEF of 30-35%, global hypokinesis present with some regional variability, septal motion is consistent with bundle branch block     Off Levophed  Continue amiodarone  Cardiology following  Continue GDMT will add low-dose losartan today    Atrial fib with RVR  Continue amiodarone drip  Cardiology following    Acute Hypoxemia Respiratory Failure  CXR showed bilateral interstitial infiltrates  Pulmonary edema  Patient is status post extubation on 06/11 to BiPAP  Continue oxygen therapy during the day  BiPAP at night  Wean FiO2 as tolerated     Hypokalemia, improved  Continue to monitor     ESRD  Dialysis as needed  Nephrology following     Normocytic anemia  Due to folic acid, B12 def and CKD  B12 and folate acid repletion  Transfuse 1 unit RBCs today          Feeding/fluids: Full liquid diet  Analgesia: Dilaudid as needed  Sedation: N/A  Thromboprophylaxis: Heparin  Head up position: Yes  Ulcer prophylaxis: N/A  Glycemic control: Yes  Spontaneous breathing trial: N/A  Bowel care: Yes  Indwelling catheter removal: Peripheral line, HD AV fistula left upper arm  Deescalation of antibiotics: N/A         Code: Full        Disposition: Possible stepdown today        Patient was seen with the attending physician MD Nikunj Conner,   Pulmonology  [facility] - Intensive Care          Thank you for involving us in the care  of this patient. Please call with any questions.    Nikunj [place], DO [facility] P[facility]M Fellow, PGY 4     Pt seen and examined with Pulmonary/Critical Care team and this note was reviewed and validated with the following additional comments: Still has a little confusion. Recurrent A-fib, will discuss plan with Cardiology. Advancing rehab. Appetite improving. No respiratory distress but still has T-A paradox.  BP better. Tolerating IHD.    Critical Care time was spent validating the history and physical exam, reviewing the lab and imaging results, and discussing the care of the patient with the bedside nurse and the patient and/or surrogates. This critical care time did not overlap with that of any other provider or involve time for any procedures.  This patient has a high probability of sudden clinically significant deterioration which requires the highest level of physician preparedness to intervene urgently. I managed/supervised life or organ supporting interventions that required frequent physician assessments. I devoted my full attention in the ICU to the direct care of this patient for this period of time. Organ systems which are failing and require intensive, critical care support are: cardiac, respiratory, renal  Critical Care time: 35 minutes    Mickey Doe MD  Phone [phone]

## 2025-06-14 NOTE — PLAN OF CARE
Problem: Physical Therapy  Goal: Physical Therapy Goal  Description: Goals to be met by: 2025    Patient will increase functional independence with mobility by performin. Sit<>stand with CGA with RW.  2. Gait x 50 feet with RW with SBA.  3. Supine<>sit with CGA.      Outcome: Progressing   Orders received and Physical Therapy evaluation completed.    Pt required Mod/Max A to transfer supine<>sit. He required Mod A and a RW to transfer sit to stand.  He was able to take 4 side steps to his L with Mod/Max A and assist to move L LE laterally. He required Mod A to perform sitting balance at EOB.  He was able to perform 2 bouts of standing with a RW and Mod A for 45 and 75 seconds.    Rec: High Intensity Therapy  DMT: TBD at next level of care

## 2025-06-14 NOTE — PROGRESS NOTES
06/14/25 1500   Post-Hemodialysis Assessment   Rinseback Volume (mL) 250 mL   Blood Volume Processed (Liters) 55.6 L   Dialyzer Clearance Lightly streaked   Duration of Treatment 210 minutes   Additional Fluid Intake (mL) 500 mL   Total UF (mL) 1500 mL   Net Fluid Removal 1000   Patient Response to Treatment tolerated well   Post-Hemodialysis Comments NDN. VSS. Blood returned. Lines flushed. Needles pulled. Pressure held until hemostasis. Gauze dressing CDI. Report given to RN

## 2025-06-14 NOTE — ASSESSMENT & PLAN NOTE
Patient has long standing persistent (>12 months) atrial fibrillation. Patient is currently in sinus rhythm. [id number] Score: 1. The patients heart rate in the last 24 hours is as follows:  Pulse  Min: 79  Max: 107     Antiarrhythmics  amiodarone tablet 200 mg, 2 times daily, Oral    Anticoagulants  heparin (porcine) injection 5,000 Units, Every 8 hours, Subcutaneous    Plan  - Replete lytes with a goal of K>4, Mg >2  - Patient is anticoagulated, see medications listed above.  - Patient's afib is currently controlled

## 2025-06-14 NOTE — ASSESSMENT & PLAN NOTE
Patient's blood pressure range in the last 24 hours was: BP  Min: 127/62  Max: 192/91.The patient's inpatient anti-hypertensive regimen is listed below:  Current Antihypertensives  carvediloL tablet 12.5 mg, 2 times daily, Oral  hydrALAZINE tablet 50 mg, Every 12 hours, Oral  losartan tablet 25 mg, Daily, Oral    Plan  - BP is controlled, no changes needed to their regimen  - holding antihypertensives in setting of arrest/shock
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
## 2025-06-14 NOTE — ASSESSMENT & PLAN NOTE
Patient with Hypoxic Respiratory failure which is Acute.  he is not on home oxygen. Supplemental oxygen was provided and noted-      .   Signs/symptoms of respiratory failure include- lethargy. Contributing diagnoses includes - CHF Labs and images were reviewed. Patient Has recent ABG, which has been reviewed. Will treat underlying causes and adjust management of respiratory failure as follows- on mechanical ventilation; extubated on 06/11

## 2025-06-14 NOTE — PT/OT/SLP PROGRESS
"Speech Language Pathology Treatment    Patient Name:  Domingo Gross   MRN:  [id number]  Admitting Diagnosis: Cardiac arrest    Recommendations:                 General Recommendations:  dysphagia management, diet follow up, ongoing assessment of cognition and speech   Continue to recommend MRI imaging (coded 3 times), Neuro consult (communicated with team)  Diet recommendations:  Minced/moist diet and THIN liquids (can bribe with coke cola)   Aspiration Precautions: upright for PO, frequent oral care, alternate sips and bites, whole meds one by one, encourage self feeding, provide tray set-up  General Precautions: Standard, fall, respiratory  Communication strategies:  redirect, repeat info, delayed responses   Assessment:     Domingo Gorss is a 64 y.o. male admitted with cardiac arrest, was intubated, coded 3 instances who presents with impaired cognition, impaired attention, weak voice and generalized weakness.  Pt with mumbled and garbled speech when responding to questions.   Pt deemed safe to start minced/moist and thin liquids with assistance for tray set-up.       CT of the head: 6/9: The ventricular system, sulcal pattern and parenchymal attenuation characteristics are consistent with chronic change.  There is no evidence for intracranial mass, mass effect or midline shift and there is no evidence for acute intracranial hemorrhage.  Appropriate CSF spaces are seen at the skull base.   Subjective     Pt seen at bedside in ICU. Pt was upright in bed, remains in restraints from overnight due to agitation and pulling at lines.   Patient goals: "Yeah I want my coke."     Pain/Comfort:  Pain Rating 1: 0/10    Respiratory Status: NC 2L    Objective:     Has the patient been evaluated by SLP for swallowing?   Yes  Keep patient NPO? No       Swallowing: Spoke with RN who reported no issues with swallowing whole meds. Pt needed assistance with full liquids breakfast tray and RN reported no issues either. Pt is " awake, mumbles when asked basic questions. Pt agreed to water, coke by straw, some bites of pudding, moisten cracker also given. Pt tolerated water, coke cola, pudding bites with no difficulty. Pt with fair ap transfer, adequate labial/buccal seal and no oral residue noted. Pt required increased mastication (No dentures present-states he wears them at home) and liquid rinse to clear dried up cracker on tongue. Pt tolerated bites of diced peaches x3 then stated he did not want any more. Pt did ask for his coke again and consumed another 2 oz. Pt was trying to hold cup but remains in restraints. No coughing or choking appreciated with PO  trials. Pt safe to advance to minced/moist and thin liquids. Primary MD team made aware of diet recs.     Cognition/Communication: Pt tends to mumble and looks away when asked questions. Pt with limited eye contact noted during conversation.  Pt needs frequent cues to provide audible answer when asked orientation questions. Pt with limited insight that he cannot be understood to this listener. Pt finally able to state [place] stated [place] (given choice of 2), provided year but cannot state why he is in the hospital. When asked about his dentures, he stated his wife cannot drive. (Unsure about other family) Pt was encouraged to speak loudly and over-articulate when providing a response.  Cont to recommend MRI imaging and neuro consult. Pt was living indep'tly with his wife and is the primary  at home.      Goals:   Multidisciplinary Problems       SLP Goals          Problem: SLP    Goal Priority Disciplines Outcome   SLP Goal     SLP Progressing   Description: Updated Short Term Goals:  1. Ongoing swallow eval to determine safest diet level. - MET 6/14  2. Pt tolerate FULL liquids with no audible dysphagia signs.  MET 6/14  3. Pt will participate in informal cognitive/speech/lang eval- ongoing  4. Pt will consume minced/moist diet and thin liquids  with no audible dysphagia signs  5. Pt will state basic orientation with min cues.   6. Pt will answer general questions with audible yes/no response  7. Pt will attend to structured tasks with mod cues.   8. Pt will utilize speech strategies to be understood by staff including: making eye contact, speak loudly, clearly and over-articulate with mod cues.                        Plan:     Patient to be seen:  3 x/week   Plan of Care expires:  07/11/25  Plan of Care reviewed with:  patient   SLP Follow-Up:  Yes       Discharge recommendations:   (TBD)   Barriers to Discharge:  none    Time Tracking:     SLP Treatment Date:   06/14/25  Speech Start Time:  1023  Speech Stop Time:  1050     Speech Total Time (min):  27 min    Billable Minutes: Speech Therapy Individual 12 and Treatment Swallowing Dysfunction 15    06/14/2025

## 2025-06-14 NOTE — PROGRESS NOTES
[facility] - Intensive Care  Cardiology  Progress Note    Patient Name: Domingo Gross  MRN: [id number]  Admission Date: 6/7/2025  Hospital Length of Stay: 7 days  Code Status: Full Code   Attending Physician: Juju Varela MD   Primary Care Physician: Geeta Coffey MD  Expected Discharge Date:   Principal Problem:Cardiac arrest    Subjective:     Hospital Course:        Per Dr. Salazar consult note 6/7/2025 64 y.o. male with PMH ESRD on HD, PAD with multiple interventions, CAD (mid LAD/prox RCA PCI 3/2019 and prox LCA in 10/2019 following out of hospital cardiac arrest), after cardiac arrest in setting of prox LCX occlusion treated with PCI, 9/16/2024 s/p intervention w cutting/shockwave PTCA to Lcx, 01/24/25 PCI of OM, new reduction in EF presents to ER with substernal chest pain that radiates to his throat. Reports his chest pain started last night which did not improved with nitro. Trops with significant delta from baseline. Concerning symptoms given extensive cardiovascular history.   CAD with multiple interventions/ NSTEMI: start heparin gtt, hold eliquis, continue DAPT, statin, bb  HFrEF - continue BB, will continue to optimize GDMT   Afib per EP- holding eliquis, continue heparin   ESRD on HD- please consult nephro   -NPO Sunday MN FOR CATH+/-PCI Monday. If refractory chest pain despite meds contact me - low threshold for cath.   -Trend troponin     6/9/2025 Cardiac arrest with PEA with ACLS initiated and VTach as well. LHC cancelled   Per  Cardiac arrest- PEA ARREST multiples ACLS round. qTC >600ms .   Multiple cardiac arrest in patient with extensive CAD history  ESRD  NSTEMI   Plan:   -DAPT  -Levophed  prn to keep MAP > 65  -Continue DAPT (recent PCI of Lcx)  -HOLDING PO MEDS cautious with IV amiodarone meds   -Monitor Qtc and electrolytes  -Extubate on BiPAP per ICU team.   -Please contact me for any cardiac question      6/13/2025 HD yesterday with 1L removed. H&H down to 7.4&22.5 this AM  Recurrent afib with RVR this AM. BP stable overnight     06/14/2025: Seen and examined this morning.  Still with some confusion.  Back to sinus rhythm.  Post blood transfusion with adequate response    Review of Systems   Unable to perform ROS: Mental status change     Objective:     Vital Signs (Most Recent):  Temp: 98.5 °F (36.9 °C) (06/14/25 0315)  Pulse: 90 (06/14/25 0815)  Resp: (!) 22 (06/14/25 0815)  BP: (!) 187/84 (06/14/25 0715)  SpO2: 100 % (06/14/25 0815) Vital Signs (24h Range):  Temp:  [97.4 °F (36.3 °C)-98.7 °F (37.1 °C)] 98.5 °F (36.9 °C)  Pulse:  [] 90  Resp:  [17-37] 22  SpO2:  [93 %-100 %] 100 %  BP: (109-192)/(61-98) 187/84     Weight: 81.6 kg (179 lb 14.3 oz)  Body mass index is 25.81 kg/m².    SpO2: 100 %         Intake/Output Summary (Last 24 hours) at 6/14/2025 1033  Last data filed at 6/14/2025 0721  Gross per 24 hour   Intake 1254.01 ml   Output 300 ml   Net 954.01 ml       Lines/Drains/Airways       Drain  Duration             Male External Urinary Catheter 06/14/25 0821 Small <1 day              Peripheral Intravenous Line  Duration                  Hemodialysis AV Fistula Left upper arm -- days         Peripheral IV - Single Lumen 06/12/25 1816 20 G Anterior;Right Forearm 1 day         Peripheral IV - Single Lumen 06/13/25 1445 20 G Anterior;Right Wrist <1 day                    Physical Exam  Constitutional:       Appearance: He is ill-appearing.   HENT:      Head: Normocephalic and atraumatic.   Cardiovascular:      Rate and Rhythm: Normal rate and regular rhythm.      Heart sounds: No murmur heard.  Pulmonary:      Breath sounds: Rhonchi present.   Abdominal:      General: Abdomen is flat.      Palpations: Abdomen is soft.   Neurological:      Mental Status: He is disoriented.         Significant Labs: BMP:   Recent Labs   Lab 06/12/25  1829 06/13/25  0420 06/14/25  0347   * 102 134*    140 141   K 4.5 4.4 4.0   CL 99 100 101   CO2 27 26 27   BUN 32* 45* 67*  "  CREATININE 3.6* 4.4* 5.0*   CALCIUM 8.5* 8.4* 8.5*   MG 2.0 2.1  --    , CMP   Recent Labs   Lab 06/12/25  1829 06/13/25  0420 06/14/25  0347    140 141   K 4.5 4.4 4.0   CL 99 100 101   CO2 27 26 27   * 102 134*   BUN 32* 45* 67*   CREATININE 3.6* 4.4* 5.0*   CALCIUM 8.5* 8.4* 8.5*   ALBUMIN  --  2.4* 2.5*   ANIONGAP 15 14 13   , INR No results for input(s): "INR", "PROTIME" in the last 48 hours., Lipid Panel No results for input(s): "CHOL", "HDL", "LDLCALC", "TRIG", "CHOLHDL" in the last 48 hours., Troponin No results for input(s): "TROPONINIHS" in the last 48 hours., and All pertinent lab results from the last 24 hours have been reviewed.    Significant Imaging: Echocardiogram: 2D echo with color flow doppler: No results found. However, due to the size of the patient record, not all encounters were searched. Please check Results Review for a complete set of results. and Transthoracic echo (TTE) complete (Cupid Only):   Results for orders placed or performed during the hospital encounter of 06/07/25   Echo   Result Value Ref Range    BSA 2.01 m2    Child's Biplane MOD Ejection Fraction 33 %    A2C EF 25 %    A4C EF 37 %    LVIDd 4.4 3.5 - 6.0 cm    LV Systolic Volume 55 mL    LV Systolic Volume Index 27.5 mL/m2    LVIDs 3.6 2.1 - 4.0 cm    LV Diastolic Volume 89 mL    LV Diastolic Volume Index 44.50 mL/m2    LV EDV A2C 100.988494823013729 mL    LV EDV A4C 109.71 mL    Left Ventricular End Systolic Volume by Teichholz Method 54.60 mL    Left Ventricular End Diastolic Volume by Teichholz Method 88.67 mL    IVS 0.9 0.6 - 1.1 cm    FS 18.2 (A) 28 - 44 %    Left Ventricle Relative Wall Thickness 0.50 cm    PW 1.1 0.6 - 1.1 cm    LV mass 147.8 g    LV Mass Index 73.9 g/m2    ZLVIDS 0.05     ZLVIDD -2.81     Narrative      Left Ventricle: The left ventricle is normal in size. Mildly increased   wall thickness. Global hypokinesis present with some regional variability.    Septal motion is consistent with " bundle branch block. There is moderately   reduced systolic function with a visually estimated ejection fraction of   30 - 35%.    Right Ventricle: The right ventricle is normal in size Systolic   function is normal.       Assessment and Plan:     Brief HPI:   1. Cardiac arrest/ PEA arrest, and reported VT during the code  2. History of coronary artery disease status post multiple PCI  3. Heart failure reduced EF/ischemic cardiomyopathy  4. ESRD on dialysis  5. Paroxysmal Afib  6. Anemia post PRBCs transfusion       Plan  Good recovery post cardiac arrest from cardiac standpoint.  Still with confusion  We will recommend medical therapy from cardiac standpoint at this time  I will switch amiodarone to p.o. amnio.  Repeat EKG tomorrow to monitor his QT  Continue aspirin and Plavix.  Anticoagulation on hold given and.  He has baseline hemoglobin around 10.  Continue to monitor for now and if no further drop them reconsider restarting his Eliquis and stopping aspirin.   Resume Coreg 12.5 mg b.i.d.  Increase hydralazine to 50 mg b.i.d.  We will consider initiating Isordil         Total critical care time: Approximately 45 minutes     Due to a high probability of clinically significant, life threatening deterioration, I personally spent this critical care time directly and personally managing the patient. This critical care time included obtaining a history; examining the patient; ordering and review of studies; arranging urgent treatment with development of a management plan; evaluation of patient's response to treatment; frequent reassessment; and, discussions with other providers.   This critical care time was performed to assess and manage the high probability of imminent, life-threatening deterioration that could result in multi-organ failure. It was medically reasonable and necessary. It was exclusive of separately billable procedures and treating other patients and teaching time     Active Diagnoses:    Diagnosis  Date Noted POA    PRINCIPAL PROBLEM:  Cardiac arrest [I46.9] 10/04/2019 Yes    Prolonged Q-T interval on ECG [R94.31] 06/12/2025 No    Cardiogenic shock [R57.0] 06/11/2025 No    Anoxic encephalopathy due to cardiac arrest [G93.1, I46.9] 06/11/2025 No    Acute respiratory failure with hypoxia [J96.01] 06/11/2025 No    NSTEMI (non-ST elevated myocardial infarction) [I21.4] 06/09/2025 Yes    HFrEF (heart failure with reduced ejection fraction) [I50.20] 09/13/2024 Yes    History of MI (myocardial infarction) [I25.2] 06/25/2024 Yes    ESRD (end stage renal disease) [N18.6] 02/16/2024 Yes    Paroxysmal atrial fibrillation [I48.0] 08/24/2023 Yes    Hypothyroidism [E03.9] 03/02/2023 Yes    Anemia due to chronic kidney disease, on chronic dialysis [N18.6, D63.1, Z99.2] 12/15/2021 Not Applicable    Atherosclerosis of native coronary artery of native heart with unstable angina pectoris [I25.110] 03/29/2019 Yes    Hyperlipidemia [E78.5] 06/12/2015 Yes    Primary hypertension [I10] 04/29/2013 Yes      Problems Resolved During this Admission:       VTE Risk Mitigation (From admission, onward)           Ordered     heparin (porcine) injection 5,000 Units  Every 8 hours         06/11/25 1415     IP VTE HIGH RISK PATIENT  Once         06/07/25 1155     Place sequential compression device  Until discontinued         06/07/25 1155                    Valente Moran MD  Cardiology  [facility] - Intensive Care

## 2025-06-14 NOTE — EICU
[facility] Night Rounds Checklist  24H Vital Sign Range:  Temp:  [97.4 °F (36.3 °C)-98.7 °F (37.1 °C)]   Pulse:  []   Resp:  [14-32]   BP: (101-170)/(59-93)   SpO2:  [96 %-100 %]     Video rounds and LDA reconciliation

## 2025-06-14 NOTE — ASSESSMENT & PLAN NOTE
Anemia is likely due to chronic disease due to ESRD. Most recent hemoglobin and hematocrit are listed below.  Recent Labs     06/12/25  0440 06/13/25  0420 06/14/25  0347   HGB 7.3* 7.4* 8.5*   HCT 21.8* 22.5* 25.6*     Plan  - Monitor serial CBC: Daily  - Transfuse PRBC if patient becomes hemodynamically unstable, symptomatic or H/H drops below 7/21.  - Patient has not received any PRBC transfusions to date.  - Patient's anemia is currently stable

## 2025-06-14 NOTE — PLAN OF CARE
Problem: SLP  Goal: SLP Goal  Description: Updated Short Term Goals:  1. Ongoing swallow eval to determine safest diet level. - MET 6/14  2. Pt tolerate FULL liquids with no audible dysphagia signs.  MET 6/14  3. Pt will participate in informal cognitive/speech/lang eval- ongoing  4. Pt will consume minced/moist diet and thin liquids with no audible dysphagia signs  5. Pt will state basic orientation with min cues.   6. Pt will answer general questions with audible yes/no response  7. Pt will attend to structured tasks with mod cues.   8. Pt will utilize speech strategies to be understood by staff including: making eye contact, speak loudly, clearly and over-articulate with mod cues.   Outcome: Progressing   Pt seen at bedside, intermittent confusion and attention deficits persist. Pt requesting coke but at times inaudible and slurred speech present. Pt to be advanced to minced/moist and thin liquids. Will need tray set-up and can attempt to self feed when wrist restraints taken off. Rec: MRI imaging plus neuro consult due to impaired cognition.

## 2025-06-14 NOTE — PT/OT/SLP EVAL
Physical Therapy Evaluation    Patient Name:  Domingo Gross   MRN:  [id number]    Recommendations:     Discharge Recommendations: High Intensity Therapy   Discharge Equipment Recommendations: to be determined by next level of care   Barriers to discharge: Inaccessible home    Assessment:     Domingo Gross is a 64 y.o. male admitted with a medical diagnosis of Cardiac arrest.  He presents with the following impairments/functional limitations: weakness, impaired endurance, impaired self care skills, impaired functional mobility, gait instability, impaired balance, impaired cognition, decreased coordination, decreased upper extremity function, decreased safety awareness, impaired coordination, impaired fine motor. Pt required Mod/Max A to transfer supine<>sit. He required Mod A and a RW to transfer sit to stand.  He was able to take 4 side steps to his L with Mod/Max A and assist to move L LE laterally. He required Mod A to perform sitting balance at EOB.  He was able to perform 2 bouts of standing with a RW and Mod A for 45 and 75 seconds.    Rec: High Intensity Therapy  DMT: TBD at next level of care.    Rehab Prognosis: Good; patient would benefit from acute skilled PT services to address these deficits and reach maximum level of function.    Recent Surgery: Procedure(s) (LRB):  ANGIOGRAM, CORONARY ARTERY (N/A)      Plan:     During this hospitalization, patient to be seen 5 x/week to address the identified rehab impairments via gait training, therapeutic activities, therapeutic exercises, neuromuscular re-education and progress toward the following goals:    Plan of Care Expires:  07/14/25    Subjective     Chief Complaint: Pt with no c/o pain  Patient/Family Comments/goals: pt agreeable to the evaluation  Pain/Comfort:  Pain Rating 1: 0/10  Pain Rating Post-Intervention 1: 0/10    Patients cultural, spiritual, Sikh conflicts given the current situation: no    Living Environment:  Pt lives with his wife  and grandson in a H with 4 steps to enter.  Prior to admission, patients level of function was independent.  Equipment used at home: none.  DME owned (not currently used): none.  Upon discharge, patient will have assistance from his spouse.    Objective:     Communicated with Nurse prior to session.  Patient found HOB elevated with blood pressure cuff, Condom Catheter, oxygen, peripheral IV, pulse ox (continuous), telemetry, restraints  upon PT entry to room.    General Precautions: Standard, fall, respiratory  Orthopedic Precautions:N/A   Braces: N/A  Respiratory Status: Nasal cannula, flow 2 L/min    Exams:  Cognitive Exam:  Patient is oriented to Person  Sensation:    -       Intact  RLE ROM: WFL  RLE Strength: WFL  LLE ROM: WFL  LLE Strength: WFL    Functional Mobility:  Bed Mobility:     Rolling Left:  maximal assistance  Supine to Sit: Mod/Max A  Sit to Supine: Mod/Max A  Transfers:     Sit to Stand:  moderate assistance with rolling walker  Gait: Pt was able to take 4 lateral steps to the L with Mod/Max A with assist to move L LE laterally   Balance: Pt required Mod A with sitting balance at EOB and Mod A with RW for standing balance for 45 and 75 seconds      AM-PAC 6 CLICK MOBILITY  Total Score:11       Treatment & Education:  Role Physical Therapy  Transfer and gait training with a RW  Balance training in sitting and standing    Patient left HOB elevated with all lines intact, call button in reach, and Nurse notified.    GOALS:   Multidisciplinary Problems       Physical Therapy Goals          Problem: Physical Therapy    Goal Priority Disciplines Outcome Interventions   Physical Therapy Goal     PT, PT/OT Progressing    Description: Goals to be met by: 2025    Patient will increase functional independence with mobility by performin. Sit<>stand with CGA with RW.  2. Gait x 50 feet with RW with SBA.  3. Supine<>sit with CGA.                           DME Justifications:  No DME recommended  requiring DME justifications    History:     Past Medical History:   Diagnosis Date    Acute congestive heart failure 09/13/2024    Allergy     Anemia     Anticoagulant long-term use     Arthritis     CKD (chronic kidney disease) stage 4, GFR 15-29 ml/min     Closed fracture of transverse process of lumbar vertebra 02/16/2024    COPD (chronic obstructive pulmonary disease) 08/20/2021    Coronary artery disease     Heart attack 10/04/2019    Hematuria     Hemothorax     Hyperlipidemia     Hypertension     Hyperuricemia     Hypocalcemia     Hypokalemia     Hypophosphatemia     Hypothyroidism 03/02/2023    PAD (peripheral artery disease)     Proteinuria     Vitamin D deficiency        Past Surgical History:   Procedure Laterality Date    ABDOMINAL AORTOGRAPHY N/A 5/10/2019    Procedure: AORTOGRAM-ABDOMINAL;  Surgeon: Keo Jenkins MD;  Location: [facility] CATH LAB/EP;  Service: Cardiology;  Laterality: N/A;  RSFA intervention     ANGIOGRAM, CORONARY, WITH LEFT HEART CATHETERIZATION N/A 1/24/2025    Procedure: Angiogram, Coronary, with Left Heart Cath;  Surgeon: Valente Moran MD;  Location: [facility] CATH LAB/EP;  Service: Cardiology;  Laterality: N/A;    AORTOGRAPHY WITH SERIALOGRAPHY N/A 12/3/2021    Procedure: AORTOGRAM, WITH SERIALOGRAPHY;  Surgeon: Keo Jenkins MD;  Location: [facility] CATH LAB/EP;  Service: Cardiology;  Laterality: N/A;    AORTOGRAPHY WITH SERIALOGRAPHY N/A 4/1/2022    Procedure: AORTOGRAM, WITH SERIALOGRAPHY;  Surgeon: Keo Jenkins MD;  Location: [facility] CATH LAB/EP;  Service: Cardiology;  Laterality: N/A;    ATHERECTOMY, CORONARY N/A 4/25/2023    Procedure: Atherectomy-coronary;  Surgeon: Keo Jenkins MD;  Location: [facility] CATH LAB/EP;  Service: Cardiology;  Laterality: N/A;    ATHERECTOMY, CORONARY N/A 1/24/2025    Procedure: Atherectomy-coronary;  Surgeon: Valente Moran MD;  Location: [facility] CATH LAB/EP;  Service: Cardiology;  Laterality: N/A;    BONE MARROW BIOPSY Right 10/12/2021     Procedure: BIOPSY-BONE MARROW;  Surgeon: Naseem Woods MD;  Location: [facility] OR;  Service: Oncology;  Laterality: Right;    CARDIAC CATHETERIZATION      CATARACT EXTRACTION      CATARACT EXTRACTION W/  INTRAOCULAR LENS IMPLANT Right 6/8/2020    Procedure: EXTRACTION, CATARACT, WITH IOL INSERTION;  Surgeon: Tin Light MD;  Location: [facility] OR;  Service: Ophthalmology;  Laterality: Right;    CATARACT EXTRACTION W/  INTRAOCULAR LENS IMPLANT Left 7/2/2020    Procedure: EXTRACTION, CATARACT, WITH IOL INSERTION;  Surgeon: Tin Light MD;  Location: [facility] OR;  Service: Ophthalmology;  Laterality: Left;    COLONOSCOPY N/A 1/6/2022    Procedure: COLONOSCOPY;  Surgeon: Kathe Penaloza MD;  Location: [facility] ENDO (2ND FLR);  Service: Endoscopy;  Laterality: N/A;  COVID test at [facility] on 1/3-GT  okay to hold Plavix for 5 days and aspirin per Dr. Becerra  2nd floor due toextensive cardiac history   instructions mailed and my [facility] portal -     CORONARY ANGIOGRAPHY N/A 3/29/2019    Procedure: ANGIOGRAM, CORONARY ARTERY;  Surgeon: Keo Jenkins MD;  Location: [facility] CATH LAB/EP;  Service: Cardiology;  Laterality: N/A;    CORONARY ANGIOGRAPHY Left 10/11/2019    Procedure: ANGIOGRAM, CORONARY ARTERY;  Surgeon: Keo Jenkins MD;  Location: [facility] CATH LAB/EP;  Service: Cardiology;  Laterality: Left;    CORONARY ANGIOGRAPHY N/A 4/10/2023    Procedure: ANGIOGRAM, CORONARY ARTERY;  Surgeon: Keo Jenkins MD;  Location: [facility] CATH LAB/EP;  Service: Cardiology;  Laterality: N/A;    CORONARY ANGIOGRAPHY N/A 6/26/2024    Procedure: ANGIOGRAM, CORONARY ARTERY;  Surgeon: Enoch Tirado MD;  Location: [facility] CATH LAB/EP;  Service: Cardiology;  Laterality: N/A;    CORONARY ANGIOGRAPHY N/A 9/16/2024    Procedure: ANGIOGRAM, CORONARY ARTERY;  Surgeon: Enoch Tirado MD;  Location: [facility] CATH LAB/EP;  Service: Cardiology;  Laterality: N/A;    CORONARY ANGIOPLASTY WITH STENT PLACEMENT  03/29/2019    mid and distal RCA     CORONARY ANGIOPLASTY WITH STENT PLACEMENT N/A 1/24/2025    Procedure: ANGIOPLASTY, CORONARY ARTERY, WITH STENT INSERTION;  Surgeon: Valente Moran MD;  Location: [facility] CATH LAB/EP;  Service: Cardiology;  Laterality: N/A;    ESOPHAGOGASTRODUODENOSCOPY N/A 1/6/2022    Procedure: EGD (ESOPHAGOGASTRODUODENOSCOPY);  Surgeon: Kathe Penaloza MD;  Location: [facility] ([address] [place] [address]);  Service: Endoscopy;  Laterality: N/A;    EYE SURGERY      INSERTION OF TUNNELED CENTRAL VENOUS HEMODIALYSIS CATHETER N/A 2/9/2024    Procedure: INSERTION, CATHETER, HEMODIALYSIS, DUAL LUMEN;  Surgeon: Wang Mendieta MD;  Location: [facility] OR;  Service: General;  Laterality: N/A;    INSTANTANEOUS WAVE-FREE RATIO (IFR) N/A 4/25/2023    Procedure: Instantaneous Wave-Free Ratio (IFR);  Surgeon: Keo Jenkins MD;  Location: [facility] CATH LAB/EP;  Service: Cardiology;  Laterality: N/A;    INTRAVASCULAR ULTRASOUND, NON-CORONARY  8/12/2022    Procedure: Intravascular Ultrasound, Non-Coronary;  Surgeon: Keo Jenkins MD;  Location: [facility] CATH LAB/EP;  Service: Cardiology;;    IVUS, CORONARY  4/25/2023    Procedure: IVUS, Coronary;  Surgeon: Keo Jenkins MD;  Location: [facility] CATH LAB/EP;  Service: Cardiology;;    IVUS, CORONARY  5/16/2023    Procedure: IVUS, Coronary;  Surgeon: Keo Jenkins MD;  Location: [facility] CATH LAB/EP;  Service: Cardiology;;  CX    IVUS, CORONARY  1/24/2025    Procedure: IVUS, Coronary;  Surgeon: Valente Moran MD;  Location: [facility] CATH LAB/EP;  Service: Cardiology;;    LEFT HEART CATHETERIZATION N/A 3/29/2019    Procedure: Left heart cath;  Surgeon: Keo Jenkins MD;  Location: [facility] CATH LAB/EP;  Service: Cardiology;  Laterality: N/A;    LEFT HEART CATHETERIZATION Left 10/8/2019    Procedure: Left heart cath;  Surgeon: Keo Jenkins MD;  Location: [facility] CATH LAB/EP;  Service: Cardiology;  Laterality: Left;    LEFT HEART CATHETERIZATION Left 4/10/2023    Procedure: Left heart cath;  Surgeon: Keo Jenkins MD;  Location: [facility]  CATH LAB/EP;  Service: Cardiology;  Laterality: Left;    LEFT HEART CATHETERIZATION Left 4/25/2023    Procedure: Left heart cath;  Surgeon: Keo Jenkins MD;  Location: [facility] CATH LAB/EP;  Service: Cardiology;  Laterality: Left;    LEFT HEART CATHETERIZATION Left 5/16/2023    Procedure: Left heart cath;  Surgeon: Keo Jenkins MD;  Location: [facility] CATH LAB/EP;  Service: Cardiology;  Laterality: Left;    LEFT HEART CATHETERIZATION Left 6/26/2024    Procedure: Left heart cath;  Surgeon: Enoch Tirado MD;  Location: [facility] CATH LAB/EP;  Service: Cardiology;  Laterality: Left;    LEFT HEART CATHETERIZATION Left 9/16/2024    Procedure: Left heart cath;  Surgeon: Enoch Tirado MD;  Location: [facility] CATH LAB/EP;  Service: Cardiology;  Laterality: Left;    LITHOTRIPSY, CORONARY TRANSLUMINAL, PERCUTANEOUS  9/16/2024    Procedure: LITHOTRIPSY, CORONARY TRANSLUMINAL, PERCUTANEOUS;  Surgeon: Enoch Tirado MD;  Location: [facility] CATH LAB/EP;  Service: Cardiology;;    PERCUTANEOUS CORONARY INTERVENTION, ARTERY N/A 6/26/2024    Procedure: Percutaneous coronary intervention;  Surgeon: Enoch Tirado MD;  Location: [facility] CATH LAB/EP;  Service: Cardiology;  Laterality: N/A;    PERCUTANEOUS TRANSLUMINAL ANGIOPLASTY (PTA) OF PERIPHERAL VESSEL N/A 7/12/2019    Procedure: PTA, PERIPHERAL VESSEL;  Surgeon: Keo Jenkins MD;  Location: [facility] CATH LAB/EP;  Service: Cardiology;  Laterality: N/A;    PERCUTANEOUS TRANSLUMINAL ANGIOPLASTY (PTA) OF PERIPHERAL VESSEL Left 5/20/2022    Procedure: PTA, PERIPHERAL VESSEL;  Surgeon: Keo Jenkins MD;  Location: [facility] CATH LAB/EP;  Service: Cardiology;  Laterality: Left;    PERCUTANEOUS TRANSLUMINAL ANGIOPLASTY (PTA) OF PERIPHERAL VESSEL Right 8/12/2022    Procedure: PTA, PERIPHERAL VESSEL;  Surgeon: Keo Jenkins MD;  Location: [facility] CATH LAB/EP;  Service: Cardiology;  Laterality: Right;    PERCUTANEOUS TRANSLUMINAL BALLOON ANGIOPLASTY OF CORONARY ARTERY  4/25/2023     Procedure: Angioplasty-coronary;  Surgeon: Keo Jenkins MD;  Location: [facility] CATH LAB/EP;  Service: Cardiology;;    PLACEMENT OF ARTERIOVENOUS GRAFT Left 4/15/2024    Procedure: INSERTION, GRAFT, ARTERIOVENOUS;  Surgeon: Scott Pascual MD;  Location: [facility] OR;  Service: General;  Laterality: Left;    PTCA, SINGLE VESSEL  5/16/2023    Procedure: PTCA, Single Vessel;  Surgeon: Keo Jenkins MD;  Location: [facility] CATH LAB/EP;  Service: Cardiology;;  CX    PTCA, SINGLE VESSEL  6/26/2024    Procedure: PTCA, Single Vessel;  Surgeon: Enoch Tirado MD;  Location: [facility] CATH LAB/EP;  Service: Cardiology;;    PTCA, SINGLE VESSEL Left 9/16/2024    Procedure: PTCA, Single Vessel;  Surgeon: Enoch Tirado MD;  Location: [facility] CATH LAB/EP;  Service: Cardiology;  Laterality: Left;    REMOVAL OF HEMODIALYSIS CATHETER Right 2/9/2024    Procedure: REMOVAL, CATHETER, HEMODIALYSIS;  Surgeon: Wang Mendieta MD;  Location: [facility] OR;  Service: General;  Laterality: Right;    REMOVAL OF TUNNELED CENTRAL VENOUS CATHETER (CVC) Right 5/20/2024    Procedure: REMOVAL, CATHETER, CENTRAL VENOUS, TUNNELED;  Surgeon: Scott Pascual MD;  Location: [facility];  Service: General;  Laterality: Right;    REPAIR, CHRONIC TOTAL OCCLUSION, CORONARY  6/26/2024    Procedure: Repair, Chronic Total Occlusion, Coronary;  Surgeon: Enoch Tirado MD;  Location: [facility] CATH LAB/EP;  Service: Cardiology;;    STENT, DRUG ELUTING, SINGLE VESSEL, CORONARY  4/25/2023    Procedure: Stent, Drug Eluting, Single Vessel, Coronary;  Surgeon: Keo Jenkins MD;  Location: [facility] CATH LAB/EP;  Service: Cardiology;;    STENT, DRUG ELUTING, SINGLE VESSEL, CORONARY  5/16/2023    Procedure: Stent, Drug Eluting, Single Vessel, Coronary;  Surgeon: Keo Jenkins MD;  Location: [facility] CATH LAB/EP;  Service: Cardiology;;  CX    TRANSESOPHAGEAL ECHOCARDIOGRAM WITH POSSIBLE CARDIOVERSION (JOSE W/ POSS CARDIOVERSION) N/A 6/19/2023    Procedure:  Transesophageal echo (JOSE) intra-procedure log documentation;  Surgeon: Keo Jenkins MD;  Location: [facility] CATH LAB/EP;  Service: Cardiology;  Laterality: N/A;       Time Tracking:     PT Received On: 06/14/25  PT Start Time: 0904     PT Stop Time: 0939  PT Total Time (min): 35 min     Billable Minutes: Evaluation 12 and Therapeutic Activity 23      06/14/2025

## 2025-06-14 NOTE — SUBJECTIVE & OBJECTIVE
Interval History:     Still with difficulty communicating due to recent STEMI    Review of Systems   Unable to perform ROS: Other     Objective:     Vital Signs (Most Recent):  Temp: 98.2 °F (36.8 °C) (06/14/25 1115)  Pulse: 93 (06/14/25 1315)  Resp: (!) 22 (06/14/25 1315)  BP: (!) 160/83 (06/14/25 1315)  SpO2: 100 % (06/14/25 1315) Vital Signs (24h Range):  Temp:  [97.9 °F (36.6 °C)-98.5 °F (36.9 °C)] 98.2 °F (36.8 °C)  Pulse:  [] 93  Resp:  [16-37] 22  SpO2:  [93 %-100 %] 100 %  BP: (127-192)/(61-98) 160/83     Weight: 81.6 kg (179 lb 14.3 oz)  Body mass index is 25.81 kg/m².    Intake/Output Summary (Last 24 hours) at 6/14/2025 1458  Last data filed at 6/14/2025 1115  Gross per 24 hour   Intake 904.01 ml   Output 290 ml   Net 614.01 ml         Physical Exam  Constitutional:       General: He is not in acute distress.  HENT:      Head: Normocephalic.      Right Ear: There is no impacted cerumen.      Left Ear: There is no impacted cerumen.      Mouth/Throat:      Pharynx: No oropharyngeal exudate or posterior oropharyngeal erythema.   Eyes:      General:         Right eye: No discharge.         Left eye: No discharge.   Cardiovascular:      Heart sounds: No murmur heard.  Pulmonary:      Effort: No respiratory distress.      Comments: On 1-2L of oxygen  Musculoskeletal:         General: No deformity.      Right lower leg: No edema.   Skin:     Coloration: Skin is not jaundiced.      Findings: No bruising.   Neurological:      Motor: No weakness.      Gait: Gait normal.   Psychiatric:         Mood and Affect: Mood normal.               Significant Labs: All pertinent labs within the past 24 hours have been reviewed.  BMP:   Recent Labs   Lab 06/13/25  0420 06/14/25  0347    134*    141   K 4.4 4.0    101   CO2 26 27   BUN 45* 67*   CREATININE 4.4* 5.0*   CALCIUM 8.4* 8.5*   MG 2.1  --      CBC:   Recent Labs   Lab 06/13/25  0420 06/14/25  0347   WBC 8.25 8.18   HGB 7.4* 8.5*   HCT 22.5*  25.6*   * 140*     CMP:   Recent Labs   Lab 06/12/25  1829 06/13/25  0420 06/14/25  0347    140 141   K 4.5 4.4 4.0   CL 99 100 101   CO2 27 26 27   * 102 134*   BUN 32* 45* 67*   CREATININE 3.6* 4.4* 5.0*   CALCIUM 8.5* 8.4* 8.5*   ALBUMIN  --  2.4* 2.5*   ANIONGAP 15 14 13       Significant Imaging: I have reviewed all pertinent imaging results/findings within the past 24 hours.  I have reviewed and interpreted all pertinent imaging results/findings within the past 24 hours.

## 2025-06-14 NOTE — PLAN OF CARE
Patient on oxygen with documented flow.  Will attempt to wean per O2 order protocol.  Will continue to monitor.  .

## 2025-06-14 NOTE — ASSESSMENT & PLAN NOTE
Patient with chest pain worsened while on HD  Trops are flat 0.14  EKG with no ischemic changes  Echo with worsening wall motion as per cardio  Seen by cardiology, plan was for Left heart cath 06/09 but patient had a cardiac arrest that morning; managing medically for now  - continue ASA/plavix/statin  - Cardiology following        Patient with chest pain worsened while on HD  Trops are flat 0.14  EKG with no ischemic changes  Echo with worsening wall motion as per cardio  Seen by cardiology, plan was for Left heart cath 06/09 but patient had a cardiac arrest that morning; managing medically for now  - continue ASA/plavix/statin  - Cardiology following

## 2025-06-14 NOTE — PROGRESS NOTES
[facility]  Progress Note    Patient Name: Domingo Gross  MRN: [id number]  Patient Class: IP- Inpatient   Admission Date: 6/7/2025  Length of Stay: 7 days  Attending Physician: Juju Varela MD  Primary Care Provider: Geeta Coffey MD        Subjective     Principal Problem:Cardiac arrest        HPI:  64 y.o. male with PMH ESRD on HD, PAD with multiple interventions, CAD (mid LAD/prox RCA PCI 3/2019 and prox LCA in 10/2019 following out of hospital cardiac arrest), after cardiac arrest in setting of prox LCX occlusion treated with PCI, 9/16/2024 s/p intervention w cutting/shockwave PTCA to Lcx, 01/24/25 PCI of OM, new reduction in EF presents to ER with substernal chest pain that radiates to his throat. Report his chest pain feels like heaviness as if something is heavy sitting on his chest, started last night at rest, he took sublingual nitro which only slightly helped but he was still feeling it, today as he was having HD the pain got worse so he came to the ED.    Patient denies smoking or alcohol use        Overview/Hospital Course:  No notes on file    Interval History:     Still with difficulty communicating due to recent STEMI    Review of Systems   Unable to perform ROS: Other     Objective:     Vital Signs (Most Recent):  Temp: 98.2 °F (36.8 °C) (06/14/25 1115)  Pulse: 93 (06/14/25 1315)  Resp: (!) 22 (06/14/25 1315)  BP: (!) 160/83 (06/14/25 1315)  SpO2: 100 % (06/14/25 1315) Vital Signs (24h Range):  Temp:  [97.9 °F (36.6 °C)-98.5 °F (36.9 °C)] 98.2 °F (36.8 °C)  Pulse:  [] 93  Resp:  [16-37] 22  SpO2:  [93 %-100 %] 100 %  BP: (127-192)/(61-98) 160/83     Weight: 81.6 kg (179 lb 14.3 oz)  Body mass index is 25.81 kg/m².    Intake/Output Summary (Last 24 hours) at 6/14/2025 5592  Last data filed at 6/14/2025 1115  Gross per 24 hour   Intake 904.01 ml   Output 290 ml   Net 614.01 ml         Physical Exam  Constitutional:       General: He is not in acute  distress.  HENT:      Head: Normocephalic.      Right Ear: There is no impacted cerumen.      Left Ear: There is no impacted cerumen.      Mouth/Throat:      Pharynx: No oropharyngeal exudate or posterior oropharyngeal erythema.   Eyes:      General:         Right eye: No discharge.         Left eye: No discharge.   Cardiovascular:      Heart sounds: No murmur heard.  Pulmonary:      Effort: No respiratory distress.      Comments: On 1-2L of oxygen  Musculoskeletal:         General: No deformity.      Right lower leg: No edema.   Skin:     Coloration: Skin is not jaundiced.      Findings: No bruising.   Neurological:      Motor: No weakness.      Gait: Gait normal.   Psychiatric:         Mood and Affect: Mood normal.               Significant Labs: All pertinent labs within the past 24 hours have been reviewed.  BMP:   Recent Labs   Lab 06/13/25  0420 06/14/25  0347    134*    141   K 4.4 4.0    101   CO2 26 27   BUN 45* 67*   CREATININE 4.4* 5.0*   CALCIUM 8.4* 8.5*   MG 2.1  --      CBC:   Recent Labs   Lab 06/13/25  0420 06/14/25  0347   WBC 8.25 8.18   HGB 7.4* 8.5*   HCT 22.5* 25.6*   * 140*     CMP:   Recent Labs   Lab 06/12/25  1829 06/13/25  0420 06/14/25  0347    140 141   K 4.5 4.4 4.0   CL 99 100 101   CO2 27 26 27   * 102 134*   BUN 32* 45* 67*   CREATININE 3.6* 4.4* 5.0*   CALCIUM 8.5* 8.4* 8.5*   ALBUMIN  --  2.4* 2.5*   ANIONGAP 15 14 13       Significant Imaging: I have reviewed all pertinent imaging results/findings within the past 24 hours.  I have reviewed and interpreted all pertinent imaging results/findings within the past 24 hours.      Assessment & Plan  Cardiac arrest  Patient had cardiac arrest requiring ACLS x3 on 6/9  Currently intubated and mechanically ventilated  Hypothermia protocol initiated, s/p rewarming  Remarkable improvement, following commands and tracking; off sedation  - extubated on 06/11.  Tolerating well  - Pulmonology and Cardiology  following    Anoxic encephalopathy due to cardiac arrest  - currently intubated, following commands--> extubated on 06/11.  Doing well  - delirium precautions    NSTEMI (non-ST elevated myocardial infarction)  History of MI (myocardial infarction)  Patient with chest pain worsened while on HD  Trops are flat 0.14  EKG with no ischemic changes  Echo with worsening wall motion as per cardio  Seen by cardiology, plan was for Left heart cath 06/09 but patient had a cardiac arrest that morning; managing medically for now  - continue ASA/plavix/statin  - Cardiology following        Patient with chest pain worsened while on HD  Trops are flat 0.14  EKG with no ischemic changes  Echo with worsening wall motion as per cardio  Seen by cardiology, plan was for Left heart cath 06/09 but patient had a cardiac arrest that morning; managing medically for now  - continue ASA/plavix/statin  - Cardiology following      Cardiogenic shock  This patient has shock. The type of shock is cardiogenic due to ischemic. The patient has the following evidence of shock: persistent hypotension and BRETT. The patient will be admitted to an intensive care unit, they will be treated with levophed; now de-escalated.  - cardiogenic shock has now resolved    HFrEF (heart failure with reduced ejection fraction)  - likely ischemic  - volume control with dialysis    Repeat echo showed   Left Ventricle: The left ventricle is moderately dilated. Mildly increased wall thickness. There is eccentric hypertrophy. Regional wall motion abnormalities present. See diagram for wall motion findings. There is moderately reduced systolic function with a visually estimated ejection fraction of 35 - 40%. Quantitated ejection fraction is 38%. Unable to assess diastolic function due to poor image quality.    Right Ventricle: The right ventricle is normal in size Systolic function is normal. TAPSE is 2.3 cm.    Overall the study quality was technically difficult.  Paroxysmal  atrial fibrillation  Patient has long standing persistent (>12 months) atrial fibrillation. Patient is currently in sinus rhythm. [id number] Score: 1. The patients heart rate in the last 24 hours is as follows:  Pulse  Min: 79  Max: 107     Antiarrhythmics  amiodarone tablet 200 mg, 2 times daily, Oral    Anticoagulants  heparin (porcine) injection 5,000 Units, Every 8 hours, Subcutaneous    Plan  - Replete lytes with a goal of K>4, Mg >2  - Patient is anticoagulated, see medications listed above.  - Patient's afib is currently controlled    Acute respiratory failure with hypoxia  Patient with Hypoxic Respiratory failure which is Acute.  he is not on home oxygen. Supplemental oxygen was provided and noted-      .   Signs/symptoms of respiratory failure include- lethargy. Contributing diagnoses includes - CHF Labs and images were reviewed. Patient Has recent ABG, which has been reviewed. Will treat underlying causes and adjust management of respiratory failure as follows- on mechanical ventilation; extubated on 06/11    ESRD (end stage renal disease)   >>ASSESSMENT AND PLAN FOR ESRD (END STAGE RENAL DISEASE) WRITTEN ON 6/7/2025  5:21 PM BY ERICH FRAZIER MD    Creatine stable for now. BMP reviewed- noted Estimated Creatinine Clearance: 15.6 mL/min (A) (based on SCr of 5 mg/dL (H)). according to latest data. Based on current GFR, CKD stage is end stage.  Monitor UOP and serial BMP and adjust therapy as needed. Renally dose meds. Avoid nephrotoxic medications and procedures.    ESRD On HD,  Primary hypertension  Patient's blood pressure range in the last 24 hours was: BP  Min: 127/62  Max: 192/91.The patient's inpatient anti-hypertensive regimen is listed below:  Current Antihypertensives  carvediloL tablet 12.5 mg, 2 times daily, Oral  hydrALAZINE tablet 50 mg, Every 12 hours, Oral  losartan tablet 25 mg, Daily, Oral    Plan  - BP is controlled, no changes needed to their regimen  - holding antihypertensives in  setting of arrest/shock    Hyperlipidemia  -Continue statin  Anemia due to chronic kidney disease, on chronic dialysis  Anemia is likely due to chronic disease due to ESRD. Most recent hemoglobin and hematocrit are listed below.  Recent Labs     06/12/25  0440 06/13/25  0420 06/14/25  0347   HGB 7.3* 7.4* 8.5*   HCT 21.8* 22.5* 25.6*     Plan  - Monitor serial CBC: Daily  - Transfuse PRBC if patient becomes hemodynamically unstable, symptomatic or H/H drops below 7/21.  - Patient has not received any PRBC transfusions to date.  - Patient's anemia is currently stable    Hypothyroidism  Cont levothyroxine     Atherosclerosis of native coronary artery of native heart with unstable angina pectoris  Patient with known CAD s/p stent placement, which is controlled Will continue ASA, Plavix, and Statin and monitor for S/Sx of angina/ACS. Continue to monitor on telemetry.   Prolonged Q-T interval on ECG  -continue to monitor    VTE Risk Mitigation (From admission, onward)           Ordered     heparin (porcine) injection 5,000 Units  Every 8 hours         06/11/25 1415     IP VTE HIGH RISK PATIENT  Once         06/07/25 1155     Place sequential compression device  Until discontinued         06/07/25 1155                    Discharge Planning   FLACO:      Code Status: Full Code   Medical Readiness for Discharge Date:   Discharge Plan A:  (to be determined)   Discharge Delays:  (pending medical stability)        Critical care time spent on the evaluation and treatment of severe organ dysfunction, review of pertinent labs and imaging studies, discussions with consulting providers and discussions with patient/family: 35 minutes.    Please place Justification for DME        Juju Varela MD  Department of Hospital Medicine   [facility] - Intensive Care

## 2025-06-14 NOTE — PLAN OF CARE
Problem: Adult Inpatient Plan of Care  Goal: Optimal Comfort and Wellbeing  Outcome: Progressing     Problem: Adult Inpatient Plan of Care  Goal: Readiness for Transition of Care  Outcome: Progressing     Problem: Fall Injury Risk  Goal: Absence of Fall and Fall-Related Injury  Outcome: Progressing     Problem: Comorbidity Management  Goal: Blood Pressure in Desired Range  Outcome: Progressing     Problem: Hemodialysis  Goal: Safe, Effective Therapy Delivery  Outcome: Progressing     Problem: Infection  Goal: Absence of Infection Signs and Symptoms  Outcome: Progressing     Problem: Delirium  Goal: Improved Behavioral Control  Outcome: Progressing     Problem: Dysrhythmia  Goal: Normalized Cardiac Rhythm  Outcome: Progressing    Pt AAOX2. NSR on monitor. Amio gtt d/c. SBP 130s-180s. O2 sats WNL on 2L NC. Q6 accucheks performed, no coverage needed. Pt tolerated diet. No BM. Total UOP 165cc, 2 episodes of unmeasured urine. 3hr HD session, 1L removed. Pt worked with PT/OT. Pt remains in restraints, order  @ 0227. POC reviewed with pt and family. Report given to MILADIS VALENZUELA

## 2025-06-14 NOTE — PT/OT/SLP EVAL
Occupational Therapy   Evaluation/tx    Name: Domingo Gross  MRN: [id number]  Admitting Diagnosis: Cardiac arrest  Recent Surgery: Procedure(s) (LRB):  ANGIOGRAM, CORONARY ARTERY (N/A)      Recommendations:     Discharge Recommendations: High Intensity Therapy  Discharge Equipment Recommendations:  to be determined by next level of care  Barriers to discharge:  Other (Comment) (increased burden of care)    Assessment:   Pt presents w/ decreased overall endurance/conditioning, balance/mobility & coordination/cognition w/ subsequent decline in (I)/safety w/ BADLs, fxnl mobility & fxnl t/f's.  OT 5x/wk to increase phys/fxnl status & maximize potential to achieve established goals for d/c-->high intensity tx w/ DME deferred.     There is expectation of returning to prior level of function to maintain independence avoiding readmission.      Pt's clinical condition meets full inpatient rehab criteria. Inpatient rehab will provide to total interdisciplinary treatment approach needed. Pt is at high risk of unplanned readmission due to fall risk and lack of 24 hour caregiver in prior setting.       Pt is able to tolerate 3 hours of daily therapy. Pt is pleasant and motivated to return to prior level of function.     Domingo Gross is a 64 y.o. male with a medical diagnosis of Cardiac arrest.  He presents with performance deficits affecting function: weakness, impaired endurance, impaired self care skills, impaired functional mobility, gait instability, impaired balance, impaired cognition, decreased coordination, decreased upper extremity function, decreased lower extremity function, decreased safety awareness, impaired cardiopulmonary response to activity.      Rehab Prognosis: Good; patient would benefit from acute skilled OT services to address these deficits and reach maximum level of function.       Plan:   Overlap with PT for portions of session due to complex nature of patient and for safety with mobility to  decrease fall risk for patient and caregiver injury requiring two skilled therapists to provide different interventions.     Patient to be seen 5 x/week to address the above listed problems via self-care/home management, therapeutic activities, therapeutic exercises  Plan of Care Expires: 07/14/25  Plan of Care Reviewed with: patient    Subjective     Chief Complaint: chest pain  Patient/Family Comments/goals: return to PLOF    Occupational Profile:  Living Environment: w/ spouse/grdson in [facility] w/ 4-5 [facility] & [facility]; t/s  Previous level of function: (I)  Roles and Routines: IADLs; drives; works  Equipment Used at Home: none  Assistance upon Discharge: fly    Pain/Comfort:  Pain Rating 1: 0/10  Pain Rating Post-Intervention 1: 0/10    Patients cultural, spiritual, Religion conflicts given the current situation:      Objective:     Communicated with: phillip prior to session.  Patient found HOB elevated with blood pressure cuff, peripheral IV, telemetry, pulse ox (continuous), oxygen upon OT entry to room.    General Precautions: Standard, fall, respiratory  Orthopedic Precautions: N/A  Braces: N/A  Respiratory Status: Nasal cannula, flow 2 L/min    Occupational Performance:    Bed Mobility:    Patient completed Supine to Sit with moderate assistance and maximal assistance  Patient completed Sit to Supine with moderate assistance and 2 persons    Functional Mobility/Transfers:  Patient completed Sit <> Stand Transfer with minimum assistance, moderate assistance, and of 2 persons  with  hand-held assist   Functional Mobility: see tx note below    Activities of Daily Living:  Lower Body Dressing: maximal assistance don socks    Cognitive/Visual Perceptual:  AO to self/month    Low, breathy & dysarthric vocal quality    Physical Exam:  BUEs grossly WFL    Sit balance: varying b/t Poor to F-  Stand balance: Poor    Coordination: decreased BLEs  Sensation: grossly intact  Endurance: F-    AMPAC 6 Click ADL:  AMPAC Total Score:  12    Treatment & Education:   Pt found in supine & agreeable to OT/PT co-eval/tx this date.  Pt AO to self/month & perf the following:  -sup-->EOB w/ Mod-max A x 1 & maintained w/ varying SB-Max A 2/2 post L lean  -standing x 2 attempts via B HHA w/ Min-Mod x 2  -stepping laterally to L via B HHA w/ Mod-Max A; required WSing Asst & abd/add Asst at BLEs for lateral advancement of feet  -returned to sitting w/ Mod A  -scooting laterally to L w/ Mod A  -returned to supine w/ Mod A x 2  **B wrist restraints re-applied at tx termination  Edu/tx re: general safety techs & HEP. Pt w/ questionable comprehension/retention.        Patient left HOB elevated with all lines intact, call button in reach, and nsg notified    GOALS:   Multidisciplinary Problems       Occupational Therapy Goals          Problem: Occupational Therapy    Goal Priority Disciplines Outcome Interventions   Occupational Therapy Goal     OT, PT/OT Progressing    Description: Goals to be met by: 07/14     Patient will increase functional independence with ADLs by performing:    UE Dressing with Stand-by Assistance.  LE Dressing with Stand-by Assistance.  Grooming while seated at sink with Stand-by Assistance.  Toileting from toilet with Stand-by Assistance for hygiene and clothing management.   Toilet transfer to toilet with Contact Guard Assistance.  Increased functional strength to WFL for ADLs.                         DME Justifications:  No DME recommended requiring DME justifications    History:     Past Medical History:   Diagnosis Date    Acute congestive heart failure 09/13/2024    Allergy     Anemia     Anticoagulant long-term use     Arthritis     CKD (chronic kidney disease) stage 4, GFR 15-29 ml/min     Closed fracture of transverse process of lumbar vertebra 02/16/2024    COPD (chronic obstructive pulmonary disease) 08/20/2021    Coronary artery disease     Heart attack 10/04/2019    Hematuria     Hemothorax     Hyperlipidemia     Hypertension      Hyperuricemia     Hypocalcemia     Hypokalemia     Hypophosphatemia     Hypothyroidism 03/02/2023    PAD (peripheral artery disease)     Proteinuria     Vitamin D deficiency          Past Surgical History:   Procedure Laterality Date    ABDOMINAL AORTOGRAPHY N/A 5/10/2019    Procedure: AORTOGRAM-ABDOMINAL;  Surgeon: Keo Jenkins MD;  Location: [facility] CATH LAB/EP;  Service: Cardiology;  Laterality: N/A;  RSFA intervention     ANGIOGRAM, CORONARY, WITH LEFT HEART CATHETERIZATION N/A 1/24/2025    Procedure: Angiogram, Coronary, with Left Heart Cath;  Surgeon: Valente Moran MD;  Location: [facility] CATH LAB/EP;  Service: Cardiology;  Laterality: N/A;    AORTOGRAPHY WITH SERIALOGRAPHY N/A 12/3/2021    Procedure: AORTOGRAM, WITH SERIALOGRAPHY;  Surgeon: Keo Jenkins MD;  Location: [facility] CATH LAB/EP;  Service: Cardiology;  Laterality: N/A;    AORTOGRAPHY WITH SERIALOGRAPHY N/A 4/1/2022    Procedure: AORTOGRAM, WITH SERIALOGRAPHY;  Surgeon: Keo Jenkins MD;  Location: [facility] CATH LAB/EP;  Service: Cardiology;  Laterality: N/A;    ATHERECTOMY, CORONARY N/A 4/25/2023    Procedure: Atherectomy-coronary;  Surgeon: Keo Jenkins MD;  Location: [facility] CATH LAB/EP;  Service: Cardiology;  Laterality: N/A;    ATHERECTOMY, CORONARY N/A 1/24/2025    Procedure: Atherectomy-coronary;  Surgeon: Valente Moran MD;  Location: [facility] CATH LAB/EP;  Service: Cardiology;  Laterality: N/A;    BONE MARROW BIOPSY Right 10/12/2021    Procedure: BIOPSY-BONE MARROW;  Surgeon: Naseem Woods MD;  Location: [facility] OR;  Service: Oncology;  Laterality: Right;    CARDIAC CATHETERIZATION      CATARACT EXTRACTION      CATARACT EXTRACTION W/  INTRAOCULAR LENS IMPLANT Right 6/8/2020    Procedure: EXTRACTION, CATARACT, WITH IOL INSERTION;  Surgeon: Tin Light MD;  Location: [facility] OR;  Service: Ophthalmology;  Laterality: Right;    CATARACT EXTRACTION W/  INTRAOCULAR LENS IMPLANT Left 7/2/2020    Procedure: EXTRACTION, CATARACT, WITH IOL INSERTION;   Surgeon: Tin Light MD;  Location: [facility];  Service: Ophthalmology;  Laterality: Left;    COLONOSCOPY N/A 1/6/2022    Procedure: COLONOSCOPY;  Surgeon: Kathe Penaloza MD;  Location: [facility] ENDO (2ND FLR);  Service: Endoscopy;  Laterality: N/A;  COVID test at [facility] on 1/3-GT  okay to hold Plavix for 5 days and aspirin per Dr. Becerra  2nd floor due toextensive cardiac history   instructions mailed and my [facility] portal -     CORONARY ANGIOGRAPHY N/A 3/29/2019    Procedure: ANGIOGRAM, CORONARY ARTERY;  Surgeon: Keo Jenkins MD;  Location: [facility] CATH LAB/EP;  Service: Cardiology;  Laterality: N/A;    CORONARY ANGIOGRAPHY Left 10/11/2019    Procedure: ANGIOGRAM, CORONARY ARTERY;  Surgeon: Keo Jenkins MD;  Location: [facility] CATH LAB/EP;  Service: Cardiology;  Laterality: Left;    CORONARY ANGIOGRAPHY N/A 4/10/2023    Procedure: ANGIOGRAM, CORONARY ARTERY;  Surgeon: Keo Jenkins MD;  Location: [facility] CATH LAB/EP;  Service: Cardiology;  Laterality: N/A;    CORONARY ANGIOGRAPHY N/A 6/26/2024    Procedure: ANGIOGRAM, CORONARY ARTERY;  Surgeon: Enoch Tirado MD;  Location: [facility] CATH LAB/EP;  Service: Cardiology;  Laterality: N/A;    CORONARY ANGIOGRAPHY N/A 9/16/2024    Procedure: ANGIOGRAM, CORONARY ARTERY;  Surgeon: Enoch Tirado MD;  Location: [facility] CATH LAB/EP;  Service: Cardiology;  Laterality: N/A;    CORONARY ANGIOPLASTY WITH STENT PLACEMENT  03/29/2019    mid and distal RCA    CORONARY ANGIOPLASTY WITH STENT PLACEMENT N/A 1/24/2025    Procedure: ANGIOPLASTY, CORONARY ARTERY, WITH STENT INSERTION;  Surgeon: Valente Moarn MD;  Location: [facility] CATH LAB/EP;  Service: Cardiology;  Laterality: N/A;    ESOPHAGOGASTRODUODENOSCOPY N/A 1/6/2022    Procedure: EGD (ESOPHAGOGASTRODUODENOSCOPY);  Surgeon: Kathe Penaloza MD;  Location: [facility] ENDO (2ND FLR);  Service: Endoscopy;  Laterality: N/A;    EYE SURGERY      INSERTION OF TUNNELED CENTRAL VENOUS HEMODIALYSIS CATHETER N/A 2/9/2024    Procedure:  INSERTION, CATHETER, HEMODIALYSIS, DUAL LUMEN;  Surgeon: Wang Mendieta MD;  Location: [facility] OR;  Service: General;  Laterality: N/A;    INSTANTANEOUS WAVE-FREE RATIO (IFR) N/A 4/25/2023    Procedure: Instantaneous Wave-Free Ratio (IFR);  Surgeon: Keo Jenkins MD;  Location: [facility] CATH LAB/EP;  Service: Cardiology;  Laterality: N/A;    INTRAVASCULAR ULTRASOUND, NON-CORONARY  8/12/2022    Procedure: Intravascular Ultrasound, Non-Coronary;  Surgeon: Keo Jenkins MD;  Location: [facility] CATH LAB/EP;  Service: Cardiology;;    IVUS, CORONARY  4/25/2023    Procedure: IVUS, Coronary;  Surgeon: Keo Jenkins MD;  Location: [facility] CATH LAB/EP;  Service: Cardiology;;    IVUS, CORONARY  5/16/2023    Procedure: IVUS, Coronary;  Surgeon: Keo Jenkins MD;  Location: [facility] CATH LAB/EP;  Service: Cardiology;;  CX    IVUS, CORONARY  1/24/2025    Procedure: IVUS, Coronary;  Surgeon: Valente Moran MD;  Location: [facility] CATH LAB/EP;  Service: Cardiology;;    LEFT HEART CATHETERIZATION N/A 3/29/2019    Procedure: Left heart cath;  Surgeon: Keo Jenkins MD;  Location: [facility] CATH LAB/EP;  Service: Cardiology;  Laterality: N/A;    LEFT HEART CATHETERIZATION Left 10/8/2019    Procedure: Left heart cath;  Surgeon: Keo Jenkins MD;  Location: [facility] CATH LAB/EP;  Service: Cardiology;  Laterality: Left;    LEFT HEART CATHETERIZATION Left 4/10/2023    Procedure: Left heart cath;  Surgeon: Keo Jenkins MD;  Location: [facility] CATH LAB/EP;  Service: Cardiology;  Laterality: Left;    LEFT HEART CATHETERIZATION Left 4/25/2023    Procedure: Left heart cath;  Surgeon: Keo Jenkins MD;  Location: [facility] CATH LAB/EP;  Service: Cardiology;  Laterality: Left;    LEFT HEART CATHETERIZATION Left 5/16/2023    Procedure: Left heart cath;  Surgeon: Keo Jenkins MD;  Location: [facility] CATH LAB/EP;  Service: Cardiology;  Laterality: Left;    LEFT HEART CATHETERIZATION Left 6/26/2024    Procedure: Left heart cath;  Surgeon: Enoch Tirado MD;   Location: [facility] CATH LAB/EP;  Service: Cardiology;  Laterality: Left;    LEFT HEART CATHETERIZATION Left 9/16/2024    Procedure: Left heart cath;  Surgeon: Enoch Tirado MD;  Location: [facility] CATH LAB/EP;  Service: Cardiology;  Laterality: Left;    LITHOTRIPSY, CORONARY TRANSLUMINAL, PERCUTANEOUS  9/16/2024    Procedure: LITHOTRIPSY, CORONARY TRANSLUMINAL, PERCUTANEOUS;  Surgeon: Enoch Tirado MD;  Location: [facility] CATH LAB/EP;  Service: Cardiology;;    PERCUTANEOUS CORONARY INTERVENTION, ARTERY N/A 6/26/2024    Procedure: Percutaneous coronary intervention;  Surgeon: Enoch Tirado MD;  Location: [facility] CATH LAB/EP;  Service: Cardiology;  Laterality: N/A;    PERCUTANEOUS TRANSLUMINAL ANGIOPLASTY (PTA) OF PERIPHERAL VESSEL N/A 7/12/2019    Procedure: PTA, PERIPHERAL VESSEL;  Surgeon: Keo Jenkins MD;  Location: [facility] CATH LAB/EP;  Service: Cardiology;  Laterality: N/A;    PERCUTANEOUS TRANSLUMINAL ANGIOPLASTY (PTA) OF PERIPHERAL VESSEL Left 5/20/2022    Procedure: PTA, PERIPHERAL VESSEL;  Surgeon: Keo Jenkins MD;  Location: [facility] CATH LAB/EP;  Service: Cardiology;  Laterality: Left;    PERCUTANEOUS TRANSLUMINAL ANGIOPLASTY (PTA) OF PERIPHERAL VESSEL Right 8/12/2022    Procedure: PTA, PERIPHERAL VESSEL;  Surgeon: Keo Jenkins MD;  Location: [facility] CATH LAB/EP;  Service: Cardiology;  Laterality: Right;    PERCUTANEOUS TRANSLUMINAL BALLOON ANGIOPLASTY OF CORONARY ARTERY  4/25/2023    Procedure: Angioplasty-coronary;  Surgeon: Keo Jenkins MD;  Location: [facility] CATH LAB/EP;  Service: Cardiology;;    PLACEMENT OF ARTERIOVENOUS GRAFT Left 4/15/2024    Procedure: INSERTION, GRAFT, ARTERIOVENOUS;  Surgeon: Scott Pascual MD;  Location: [facility] OR;  Service: General;  Laterality: Left;    PTCA, SINGLE VESSEL  5/16/2023    Procedure: PTCA, Single Vessel;  Surgeon: Keo Jenkins MD;  Location: [facility] CATH LAB/EP;  Service: Cardiology;;  CX    PTCA, SINGLE VESSEL  6/26/2024    Procedure: PTCA, Single  Vessel;  Surgeon: Enoch Tirado MD;  Location: [facility] CATH LAB/EP;  Service: Cardiology;;    PTCA, SINGLE VESSEL Left 9/16/2024    Procedure: PTCA, Single Vessel;  Surgeon: Enoch Tirado MD;  Location: [facility] CATH LAB/EP;  Service: Cardiology;  Laterality: Left;    REMOVAL OF HEMODIALYSIS CATHETER Right 2/9/2024    Procedure: REMOVAL, CATHETER, HEMODIALYSIS;  Surgeon: Wang Mendieta MD;  Location: [facility] OR;  Service: General;  Laterality: Right;    REMOVAL OF TUNNELED CENTRAL VENOUS CATHETER (CVC) Right 5/20/2024    Procedure: REMOVAL, CATHETER, CENTRAL VENOUS, TUNNELED;  Surgeon: Scott Pascual MD;  Location: [facility] OR;  Service: General;  Laterality: Right;    REPAIR, CHRONIC TOTAL OCCLUSION, CORONARY  6/26/2024    Procedure: Repair, Chronic Total Occlusion, Coronary;  Surgeon: Enoch Tirado MD;  Location: [facility] CATH LAB/EP;  Service: Cardiology;;    STENT, DRUG ELUTING, SINGLE VESSEL, CORONARY  4/25/2023    Procedure: Stent, Drug Eluting, Single Vessel, Coronary;  Surgeon: Keo Jenkins MD;  Location: [facility] CATH LAB/EP;  Service: Cardiology;;    STENT, DRUG ELUTING, SINGLE VESSEL, CORONARY  5/16/2023    Procedure: Stent, Drug Eluting, Single Vessel, Coronary;  Surgeon: Keo Jenkins MD;  Location: [facility] CATH LAB/EP;  Service: Cardiology;;  CX    TRANSESOPHAGEAL ECHOCARDIOGRAM WITH POSSIBLE CARDIOVERSION (JOSE W/ POSS CARDIOVERSION) N/A 6/19/2023    Procedure: Transesophageal echo (JOSE) intra-procedure log documentation;  Surgeon: Keo Jenkins MD;  Location: [facility] CATH LAB/EP;  Service: Cardiology;  Laterality: N/A;       Time Tracking:     OT Date of Treatment: 06/14/25  OT Start Time: 0908  OT Stop Time: 0941  OT Total Time (min): 33 min    Billable Minutes:Evaluation 10  Therapeutic Activity 23  Total Time 33--co-eval/tx w/ PT    6/14/2025     A client care conference was performed between the LOTR and [facility], prior to treatment by [facility], to discuss the patient's status, treatment plan  and established goals.

## 2025-06-14 NOTE — ASSESSMENT & PLAN NOTE
>>ASSESSMENT AND PLAN FOR ESRD (END STAGE RENAL DISEASE) WRITTEN ON 6/7/2025  5:21 PM BY ERICH FRAZIER MD    Creatine stable for now. BMP reviewed- noted Estimated Creatinine Clearance: 15.6 mL/min (A) (based on SCr of 5 mg/dL (H)). according to latest data. Based on current GFR, CKD stage is end stage.  Monitor UOP and serial BMP and adjust therapy as needed. Renally dose meds. Avoid nephrotoxic medications and procedures.    ESRD On HD,

## 2025-06-14 NOTE — EICU
Virtual ICU Quality Rounds    Admit Date: 6/7/2025  Hospital Day: 7    ICU Day: 5d 4h    24H Vital Sign Range:  Temp:  [97.4 °F (36.3 °C)-98.7 °F (37.1 °C)]   Pulse:  []   Resp:  [17-37]   BP: (109-192)/(60-98)   SpO2:  [93 %-100 %]     VICU Surveillance Screening                    LDA reconciliation : Yes

## 2025-06-14 NOTE — PLAN OF CARE
Problem: Occupational Therapy  Goal: Occupational Therapy Goal  Description: Goals to be met by: 07/14     Patient will increase functional independence with ADLs by performing:    UE Dressing with Stand-by Assistance.  LE Dressing with Stand-by Assistance.  Grooming while seated at sink with Stand-by Assistance.  Toileting from toilet with Stand-by Assistance for hygiene and clothing management.   Toilet transfer to toilet with Contact Guard Assistance.  Increased functional strength to WFL for ADLs.    Outcome: Progressing   Pt found in supine & agreeable to OT/PT co-eval/tx this date.  Pt AO to self/month & perf the following:  -sup-->EOB w/ Mod-max A x 1 & maintained w/ varying SB-Max A 2/2 post L lean  -standing x 2 attempts via B HHA w/ Min-Mod x 2  -stepping laterally to L via B HHA w/ Mod-Max A; required WSing Asst & abd/add Asst at BLEs for lateral advancement of feet  -returned to sitting w/ Mod A  -scooting laterally to L w/ Mod A  -returned to supine w/ Mod A x 2  **B wrist restraints re-applied at tx termination  Edu/tx re: general safety techs & HEP. Pt w/ questionable comprehension/retention.    Pt presents w/ decreased overall endurance/conditioning, balance/mobility & coordination/cognition w/ subsequent decline in (I)/safety w/ BADLs, fxnl mobility & fxnl t/f's.  OT 5x/wk to increase phys/fxnl status & maximize potential to achieve established goals for d/c-->high intensity tx w/ DME deferred.

## 2025-06-15 NOTE — EICU
Lilly Note :    Called by the [facility]:    Problem:Renewal of restraints , pt agitated , pulling lines and tubes     Pertinent History and labs reviewed :    Cardiac arrest    Primary hypertension    Hyperlipidemia    Anemia due to chronic kidney disease, on chronic dialysis    Hypothyroidism    Paroxysmal atrial fibrillation    ESRD (end stage renal disease)    History of MI (myocardial infarction)    Atherosclerosis of native coronary artery of native heart with unstable angina pectoris    HFrEF (heart failure with reduced ejection fraction)    NSTEMI (non-ST elevated myocardial infarction)    Cardiogenic shock    Anoxic encephalopathy due to cardiac arrest    Acute respiratory failure with hypoxia    Prolonged Q-T interval on ECG     Treatment /Intervention given: Renewal of restraints done         Monica PatelU Physician

## 2025-06-15 NOTE — PLAN OF CARE
Pt AAOx2. Afebrile. Episode of afib RVR, amio gtt started (@ 0.5mg/min), NSR with PVC's now. BP stable. O2 sats stable on 2L NC, refused BIPAP overnight. No BM overnight. 1 large unmeasured urine occurrence. Labs reviewed. Safety maintained, 2-point soft restraints   @ 0158. POC reviewed w/ pt. Care ongoing.    Problem: Adult Inpatient Plan of Care  Goal: Plan of Care Review  Outcome: Progressing  Goal: Patient-Specific Goal (Individualized)  Outcome: Progressing  Goal: Absence of Hospital-Acquired Illness or Injury  Outcome: Progressing  Goal: Optimal Comfort and Wellbeing  Outcome: Progressing  Goal: Readiness for Transition of Care  Outcome: Progressing     Problem: Wound  Goal: Optimal Coping  Outcome: Progressing  Goal: Optimal Functional Ability  Outcome: Progressing  Goal: Absence of Infection Signs and Symptoms  Outcome: Progressing  Goal: Improved Oral Intake  Outcome: Progressing  Goal: Optimal Pain Control and Function  Outcome: Progressing  Goal: Skin Health and Integrity  Outcome: Progressing  Goal: Optimal Wound Healing  Outcome: Progressing     Problem: Fall Injury Risk  Goal: Absence of Fall and Fall-Related Injury  Outcome: Progressing     Problem: Comorbidity Management  Goal: Blood Pressure in Desired Range  Outcome: Progressing     Problem: Hemodialysis  Goal: Safe, Effective Therapy Delivery  Outcome: Progressing  Goal: Effective Tissue Perfusion  Outcome: Progressing  Goal: Absence of Infection Signs and Symptoms  Outcome: Progressing     Problem: Infection  Goal: Absence of Infection Signs and Symptoms  Outcome: Progressing     Problem: Skin Injury Risk Increased  Goal: Skin Health and Integrity  Outcome: Progressing     Problem: Delirium  Goal: Optimal Coping  Outcome: Progressing  Goal: Improved Behavioral Control  Outcome: Progressing  Goal: Improved Attention and Thought Clarity  Outcome: Progressing  Goal: Improved Sleep  Outcome: Progressing     Problem: Mechanical Ventilation  Invasive  Goal: Effective Communication  Outcome: Progressing  Goal: Optimal Device Function  Outcome: Progressing  Goal: Optimal Nutrition Delivery  Outcome: Progressing  Goal: Absence of Device-Related Skin and Tissue Injury  Outcome: Progressing  Goal: Absence of Ventilator-Induced Lung Injury  Outcome: Progressing     Problem: Dysrhythmia  Goal: Normalized Cardiac Rhythm  Outcome: Progressing

## 2025-06-15 NOTE — ASSESSMENT & PLAN NOTE
Patient's blood pressure range in the last 24 hours was: BP  Min: 88/52  Max: 182/103.The patient's inpatient anti-hypertensive regimen is listed below:  Current Antihypertensives  carvediloL tablet 12.5 mg, 2 times daily, Oral  hydrALAZINE tablet 50 mg, Every 12 hours, Oral  losartan tablet 25 mg, Daily, Oral    Plan  - BP is controlled, no changes needed to their regimen  - holding antihypertensives in setting of arrest/shock

## 2025-06-15 NOTE — PROGRESS NOTES
[facility] Medicine  Progress Note    Patient Name: Domingo Gross  MRN: [id number]  Patient Class: IP- Inpatient   Admission Date: 6/7/2025  Length of Stay: 8 days  Attending Physician: Juju Varela MD  Primary Care Provider: Geeta Coffey MD        Subjective     Principal Problem:Cardiac arrest        HPI:  64 y.o. male with PMH ESRD on HD, PAD with multiple interventions, CAD (mid LAD/prox RCA PCI 3/2019 and prox LCA in 10/2019 following out of hospital cardiac arrest), after cardiac arrest in setting of prox LCX occlusion treated with PCI, 9/16/2024 s/p intervention w cutting/shockwave PTCA to Lcx, 01/24/25 PCI of OM, new reduction in EF presents to ER with substernal chest pain that radiates to his throat. Report his chest pain feels like heaviness as if something is heavy sitting on his chest, started last night at rest, he took sublingual nitro which only slightly helped but he was still feeling it, today as he was having HD the pain got worse so he came to the ED.    Patient denies smoking or alcohol use        Overview/Hospital Course:  No notes on file    Interval History:     Able to speak somewhat.  Wishing for a Coke.  Appetite has improved.  Stepping down to the floor today.  Seen by cardiology and ICU.  Brief episodes of AFib overnight.    Review of Systems   Constitutional:  Positive for appetite change and fatigue.   HENT:  Negative for ear discharge and ear pain.    Eyes:  Negative for pain and redness.   Respiratory:  Negative for cough and shortness of breath.    Cardiovascular:  Positive for chest pain.   Gastrointestinal:  Negative for blood in stool and constipation.   Endocrine: Negative for polydipsia and polyphagia.   Genitourinary:  Negative for genital sores and hematuria.   Musculoskeletal:  Positive for myalgias. Negative for joint swelling.   Skin:  Negative for rash.   Neurological:  Negative for seizures and numbness.   Psychiatric/Behavioral:   Negative for decreased concentration and dysphoric mood.      Objective:     Vital Signs (Most Recent):  Temp: 98.3 °F (36.8 °C) (06/15/25 1115)  Pulse: 76 (06/15/25 0915)  Resp: 13 (06/15/25 0915)  BP: 131/77 (06/15/25 0828)  SpO2: 100 % (06/15/25 0915) Vital Signs (24h Range):  Temp:  [97.6 °F (36.4 °C)-100.1 °F (37.8 °C)] 98.3 °F (36.8 °C)  Pulse:  [] 76  Resp:  [11-26] 13  SpO2:  [97 %-100 %] 100 %  BP: ()/() 131/77     Weight: 81.6 kg (179 lb 14.3 oz)  Body mass index is 25.81 kg/m².    Intake/Output Summary (Last 24 hours) at 6/15/2025 1301  Last data filed at 6/15/2025 0600  Gross per 24 hour   Intake 902.11 ml   Output 1500 ml   Net -597.89 ml         Physical Exam  Constitutional:       General: He is not in acute distress.     Appearance: Normal appearance. He is not ill-appearing.      Comments: More garbled speech.  Able to communicate better than previous.   HENT:      Head: Normocephalic and atraumatic.      Right Ear: There is no impacted cerumen.      Left Ear: Tympanic membrane normal. There is no impacted cerumen.      Nose: No congestion or rhinorrhea.      Mouth/Throat:      Pharynx: No oropharyngeal exudate or posterior oropharyngeal erythema.   Eyes:      General:         Right eye: No discharge.         Left eye: No discharge.   Cardiovascular:      Rate and Rhythm: Normal rate.      Heart sounds: No murmur heard.     No gallop.   Abdominal:      Tenderness: There is no abdominal tenderness. There is no guarding or rebound.      Hernia: No hernia is present.   Musculoskeletal:         General: No deformity.      Cervical back: No rigidity.      Right lower leg: No edema.      Comments: Chest tenderness to palpation   Lymphadenopathy:      Cervical: No cervical adenopathy.   Skin:     Findings: No bruising or lesion.   Neurological:      Mental Status: He is alert.      Motor: No weakness.      Gait: Gait normal.   Psychiatric:         Mood and Affect: Mood normal.          Behavior: Behavior normal.               Significant Labs: All pertinent labs within the past 24 hours have been reviewed.  BMP:   Recent Labs   Lab 06/15/25  0354         K 3.9      CO2 20*   BUN 48*   CREATININE 3.9*   CALCIUM 8.8     CBC:   Recent Labs   Lab 06/14/25  0347 06/15/25  0354   WBC 8.18 8.40   HGB 8.5* 9.0*   HCT 25.6* 28.0*   * 149*     CMP:   Recent Labs   Lab 06/14/25  0347 06/15/25  0354    138   K 4.0 3.9    104   CO2 27 20*   * 102   BUN 67* 48*   CREATININE 5.0* 3.9*   CALCIUM 8.5* 8.8   ALBUMIN 2.5* 2.4*   ANIONGAP 13 14       Significant Imaging: I have reviewed all pertinent imaging results/findings within the past 24 hours.  I have reviewed and interpreted all pertinent imaging results/findings within the past 24 hours.      Assessment & Plan  Cardiac arrest  Patient had cardiac arrest requiring ACLS x3 on 6/9  Currently intubated and mechanically ventilated  Hypothermia protocol initiated, s/p rewarming  Remarkable improvement, following commands and tracking; off sedation  - extubated on 06/11.  Tolerating well  - Pulmonology and Cardiology following    Anoxic encephalopathy due to cardiac arrest  - currently intubated, following commands--> extubated on 06/11.  Doing well  - delirium precautions    NSTEMI (non-ST elevated myocardial infarction)  History of MI (myocardial infarction)  Patient with chest pain worsened while on HD  Trops are flat 0.14  EKG with no ischemic changes  Echo with worsening wall motion as per cardio  Seen by cardiology, plan was for Left heart cath 06/09 but patient had a cardiac arrest that morning; managing medically for now  - continue ASA/plavix/statin  - Cardiology following        Patient with chest pain worsened while on HD  Trops are flat 0.14  EKG with no ischemic changes  Echo with worsening wall motion as per cardio  Seen by cardiology, plan was for Left heart cath 06/09 but patient had a cardiac arrest that  morning; managing medically for now  - continue ASA/plavix/statin  - Cardiology following      Patient with chest pain worsened while on HD  Trops are flat 0.14  EKG with no ischemic changes  Echo with worsening wall motion as per cardio  Seen by cardiology, plan was for Left heart cath 06/09 but patient had a cardiac arrest that morning; managing medically for now  - continue ASA/plavix/statin  - Cardiology following        Patient with chest pain worsened while on HD  Trops are flat 0.14  EKG with no ischemic changes  Echo with worsening wall motion as per cardio  Seen by cardiology, plan was for Left heart cath 06/09 but patient had a cardiac arrest that morning; managing medically for now  - continue ASA/plavix/statin  - Cardiology following      Cardiogenic shock  This patient has shock. The type of shock is cardiogenic due to ischemic. The patient has the following evidence of shock: persistent hypotension and BRETT. The patient will be admitted to an intensive care unit, they will be treated with levophed; now de-escalated.  - cardiogenic shock has now resolved    HFrEF (heart failure with reduced ejection fraction)  - likely ischemic  - volume control with dialysis    Repeat echo showed   Left Ventricle: The left ventricle is moderately dilated. Mildly increased wall thickness. There is eccentric hypertrophy. Regional wall motion abnormalities present. See diagram for wall motion findings. There is moderately reduced systolic function with a visually estimated ejection fraction of 35 - 40%. Quantitated ejection fraction is 38%. Unable to assess diastolic function due to poor image quality.    Right Ventricle: The right ventricle is normal in size Systolic function is normal. TAPSE is 2.3 cm.    Overall the study quality was technically difficult.  Paroxysmal atrial fibrillation  Patient has long standing persistent (>12 months) atrial fibrillation. Patient is currently in sinus rhythm. [id number] Score: 1. The  patients heart rate in the last 24 hours is as follows:  Pulse  Min: 74  Max: 136     Antiarrhythmics  amiodarone 360 mg/200 mL (1.8 mg/mL) infusion, Continuous, Intravenous    Anticoagulants  heparin (porcine) injection 6,000 Units, Every 8 hours, Subcutaneous    Plan  - Replete lytes with a goal of K>4, Mg >2  - Patient is anticoagulated, see medications listed above.  - Patient's afib is currently controlled    Acute respiratory failure with hypoxia  Patient with Hypoxic Respiratory failure which is Acute.  he is not on home oxygen. Supplemental oxygen was provided and noted-      .   Signs/symptoms of respiratory failure include- lethargy. Contributing diagnoses includes - CHF Labs and images were reviewed. Patient Has recent ABG, which has been reviewed. Will treat underlying causes and adjust management of respiratory failure as follows- on mechanical ventilation; extubated on 06/11    ESRD (end stage renal disease)   >>ASSESSMENT AND PLAN FOR ESRD (END STAGE RENAL DISEASE) WRITTEN ON 6/7/2025  5:21 PM BY ERICH FRAZIER MD    Creatine stable for now. BMP reviewed- noted Estimated Creatinine Clearance: 20 mL/min (A) (based on SCr of 3.9 mg/dL (H)). according to latest data. Based on current GFR, CKD stage is end stage.  Monitor UOP and serial BMP and adjust therapy as needed. Renally dose meds. Avoid nephrotoxic medications and procedures.    ESRD On HD,  Primary hypertension  Patient's blood pressure range in the last 24 hours was: BP  Min: 88/52  Max: 182/103.The patient's inpatient anti-hypertensive regimen is listed below:  Current Antihypertensives  carvediloL tablet 12.5 mg, 2 times daily, Oral  hydrALAZINE tablet 50 mg, Every 12 hours, Oral  losartan tablet 25 mg, Daily, Oral    Plan  - BP is controlled, no changes needed to their regimen  - holding antihypertensives in setting of arrest/shock    Hyperlipidemia  -Continue statin  Anemia due to chronic kidney disease, on chronic dialysis  Anemia is  likely due to chronic disease due to ESRD. Most recent hemoglobin and hematocrit are listed below.  Recent Labs     06/13/25  0420 06/14/25  0347 06/15/25  0354   HGB 7.4* 8.5* 9.0*   HCT 22.5* 25.6* 28.0*     Plan  - Monitor serial CBC: Daily  - Transfuse PRBC if patient becomes hemodynamically unstable, symptomatic or H/H drops below 7/21.  - Patient has not received any PRBC transfusions to date.  - Patient's anemia is currently stable    Hypothyroidism  Cont levothyroxine     Atherosclerosis of native coronary artery of native heart with unstable angina pectoris  Patient with known CAD s/p stent placement, which is controlled Will continue ASA, Plavix, and Statin and monitor for S/Sx of angina/ACS. Continue to monitor on telemetry.   Prolonged Q-T interval on ECG  -continue to monitor    VTE Risk Mitigation (From admission, onward)           Ordered     heparin (porcine) injection 6,000 Units  Every 8 hours         06/15/25 1208     IP VTE HIGH RISK PATIENT  Once         06/07/25 1155     Place sequential compression device  Until discontinued         06/07/25 1155                    Discharge Planning   FLACO:      Code Status: Full Code   Medical Readiness for Discharge Date:   Discharge Plan A:  (to be determined)   Discharge Delays:  (pending medical stability)        Critical care time spent on the evaluation and treatment of severe organ dysfunction, review of pertinent labs and imaging studies, discussions with consulting providers and discussions with patient/family: 35 minutes.    Please place Justification for DME        Juju Varela MD  Department of Hospital Medicine   [facility] - Intensive Care

## 2025-06-15 NOTE — PLAN OF CARE
Problem: Adult Inpatient Plan of Care  Goal: Readiness for Transition of Care  Outcome: Progressing       Problem: Fall Injury Risk  Goal: Absence of Fall and Fall-Related Injury  Outcome: Progressing        Problem: Comorbidity Management  Goal: Blood Pressure in Desired Range  Outcome: Progressing     Problem: Delirium  Goal: Improved Behavioral Control  Outcome: Progressing     Problem: Dysrhythmia  Goal: Normalized Cardiac Rhythm  Outcome: Progressing     Pt AAOX2. NSR on monitor. Amio gtt @ 0.5mg/min. BP stable. O2 sats stable on RA. Q6 accucheks performed, no coverage needed. Pt tolerated diet. No BM. Total UOP 200cc. 2pt restraints removed. POC reviewed with pt and family. Report given to PM NICOLAS

## 2025-06-15 NOTE — EICU
Intervention Initiated From:  Bedside    Margie intervened regarding:  Other    Nurse Notified:  Yes    Doctor Notified:  Yes    Comments: bedside nurse called to get restraint renewal order. Informed eicu md, .

## 2025-06-15 NOTE — PROGRESS NOTES
[facility] - Intensive Care  Cardiology  Progress Note    Patient Name: Domingo Gross  MRN: [id number]  Admission Date: 6/7/2025  Hospital Length of Stay: 8 days  Code Status: Full Code   Attending Physician: Juju Varela MD   Primary Care Physician: Geeta Coffey MD  Expected Discharge Date:   Principal Problem:Cardiac arrest    Subjective:     Hospital Course:        Per Dr. Salazar consult note 6/7/2025 64 y.o. male with PMH ESRD on HD, PAD with multiple interventions, CAD (mid LAD/prox RCA PCI 3/2019 and prox LCA in 10/2019 following out of hospital cardiac arrest), after cardiac arrest in setting of prox LCX occlusion treated with PCI, 9/16/2024 s/p intervention w cutting/shockwave PTCA to Lcx, 01/24/25 PCI of OM, new reduction in EF presents to ER with substernal chest pain that radiates to his throat. Reports his chest pain started last night which did not improved with nitro. Trops with significant delta from baseline. Concerning symptoms given extensive cardiovascular history.   CAD with multiple interventions/ NSTEMI: start heparin gtt, hold eliquis, continue DAPT, statin, bb  HFrEF - continue BB, will continue to optimize GDMT   Afib per EP- holding eliquis, continue heparin   ESRD on HD- please consult nephro   -NPO Sunday MN FOR CATH+/-PCI Monday. If refractory chest pain despite meds contact me - low threshold for cath.   -Trend troponin     6/9/2025 Cardiac arrest with PEA with ACLS initiated and VTach as well. LHC cancelled   Per  Cardiac arrest- PEA ARREST multiples ACLS round. qTC >600ms .   Multiple cardiac arrest in patient with extensive CAD history  ESRD  NSTEMI   Plan:   -DAPT  -Levophed  prn to keep MAP > 65  -Continue DAPT (recent PCI of Lcx)  -HOLDING PO MEDS cautious with IV amiodarone meds   -Monitor Qtc and electrolytes  -Extubate on BiPAP per ICU team.   -Please contact me for any cardiac question      6/13/2025 HD yesterday with 1L removed. H&H down to 7.4&22.5 this AM  Recurrent afib with RVR this AM. BP stable overnight     06/14/2025: Seen and examined this morning.  Still with some confusion.  Back to sinus rhythm.  Post blood transfusion with adequate response    06/15/2025:  Seen and examined this morning.  Was sleeping.  Had wide complex tachycardia overnight that is felt to be atrial flutter with underlying bundle-branch block.  Re-loaded with amiodarone and started on amnio drip.  EKG today back sinus rhythm.  He is V 1 appears to be more RBBB morphology now.    Review of Systems   Unable to perform ROS: Mental status change     Objective:     Vital Signs (Most Recent):  Temp: 98.3 °F (36.8 °C) (06/15/25 1115)  Pulse: 76 (06/15/25 0915)  Resp: 13 (06/15/25 0915)  BP: 131/77 (06/15/25 0828)  SpO2: 100 % (06/15/25 0915) Vital Signs (24h Range):  Temp:  [97.6 °F (36.4 °C)-100.1 °F (37.8 °C)] 98.3 °F (36.8 °C)  Pulse:  [] 76  Resp:  [11-26] 13  SpO2:  [97 %-100 %] 100 %  BP: ()/() 131/77     Weight: 81.6 kg (179 lb 14.3 oz)  Body mass index is 25.81 kg/m².    SpO2: 100 %         Intake/Output Summary (Last 24 hours) at 6/15/2025 1130  Last data filed at 6/15/2025 0600  Gross per 24 hour   Intake 902.11 ml   Output 1500 ml   Net -597.89 ml       Lines/Drains/Airways       Drain  Duration             Male External Urinary Catheter 06/14/25 0821 Small 1 day              Peripheral Intravenous Line  Duration                  Hemodialysis AV Fistula Left upper arm -- days         Peripheral IV - Single Lumen 06/12/25 1816 20 G Anterior;Right Forearm 2 days         Peripheral IV - Single Lumen 06/13/25 1445 20 G Anterior;Right Wrist 1 day                    Physical Exam  Constitutional:       Appearance: He is ill-appearing.   HENT:      Head: Normocephalic and atraumatic.   Cardiovascular:      Rate and Rhythm: Normal rate and regular rhythm.      Heart sounds: No murmur heard.  Pulmonary:      Breath sounds: Rhonchi present.   Abdominal:      General: Abdomen is  "flat.      Palpations: Abdomen is soft.   Neurological:      Mental Status: He is disoriented.         Significant Labs: BMP:   Recent Labs   Lab 06/14/25  0347 06/15/25  0354   * 102    138   K 4.0 3.9    104   CO2 27 20*   BUN 67* 48*   CREATININE 5.0* 3.9*   CALCIUM 8.5* 8.8   , CMP   Recent Labs   Lab 06/14/25  0347 06/15/25  0354    138   K 4.0 3.9    104   CO2 27 20*   * 102   BUN 67* 48*   CREATININE 5.0* 3.9*   CALCIUM 8.5* 8.8   ALBUMIN 2.5* 2.4*   ANIONGAP 13 14   , INR No results for input(s): "INR", "PROTIME" in the last 48 hours., Lipid Panel No results for input(s): "CHOL", "HDL", "LDLCALC", "TRIG", "CHOLHDL" in the last 48 hours., Troponin No results for input(s): "TROPONINIHS" in the last 48 hours., and All pertinent lab results from the last 24 hours have been reviewed.    Significant Imaging: Echocardiogram: 2D echo with color flow doppler: No results found. However, due to the size of the patient record, not all encounters were searched. Please check Results Review for a complete set of results. and Transthoracic echo (TTE) complete (Cupid Only):   Results for orders placed or performed during the hospital encounter of 06/07/25   Echo   Result Value Ref Range    BSA 2.01 m2    Child's Biplane MOD Ejection Fraction 33 %    A2C EF 25 %    A4C EF 37 %    LVIDd 4.4 3.5 - 6.0 cm    LV Systolic Volume 55 mL    LV Systolic Volume Index 27.5 mL/m2    LVIDs 3.6 2.1 - 4.0 cm    LV Diastolic Volume 89 mL    LV Diastolic Volume Index 44.50 mL/m2    LV EDV A2C [id number] mL    LV EDV A4C 109.71 mL    Left Ventricular End Systolic Volume by Teichholz Method 54.60 mL    Left Ventricular End Diastolic Volume by Teichholz Method 88.67 mL    IVS 0.9 0.6 - 1.1 cm    FS 18.2 (A) 28 - 44 %    Left Ventricle Relative Wall Thickness 0.50 cm    PW 1.1 0.6 - 1.1 cm    LV mass 147.8 g    LV Mass Index 73.9 g/m2    ZLVIDS 0.05     ZLVIDD -2.81     Narrative      Left " Ventricle: The left ventricle is normal in size. Mildly increased   wall thickness. Global hypokinesis present with some regional variability.    Septal motion is consistent with bundle branch block. There is moderately   reduced systolic function with a visually estimated ejection fraction of   30 - 35%.    Right Ventricle: The right ventricle is normal in size Systolic   function is normal.       Assessment and Plan:     Brief HPI:   1. Cardiac arrest/ PEA arrest, and reported VT during the code  2. History of coronary artery disease status post multiple PCI  3. Heart failure reduced EF/ischemic cardiomyopathy  4. ESRD on dialysis  5. Paroxysmal Afib  6. Anemia post PRBCs transfusion       Plan  Good recovery post cardiac arrest from cardiac standpoint.  Still with confusion  We will recommend medical therapy from cardiac standpoint at this time  We will continue IV amiodarone for time being.  Likely we will switch to p.o. with the next 48 hours    Monitor QT closely  Continue aspirin and Plavix.  Anticoagulation on hold given his anemia which is stable.   I will recommend starting systemic anticoagulation with heparin and monitor H&H closely and switch to DOAC if H&H stable  Once started on full-dose anticoagulation we can stop aspirin and continue Plavix  Continue Coreg 12.5 mg b.i.d.  Continue hydralazine  Continue losartan         Total critical care time: Approximately 45 minutes     Due to a high probability of clinically significant, life threatening deterioration, I personally spent this critical care time directly and personally managing the patient. This critical care time included obtaining a history; examining the patient; ordering and review of studies; arranging urgent treatment with development of a management plan; evaluation of patient's response to treatment; frequent reassessment; and, discussions with other providers.   This critical care time was performed to assess and manage the high  probability of imminent, life-threatening deterioration that could result in multi-organ failure. It was medically reasonable and necessary. It was exclusive of separately billable procedures and treating other patients and teaching time     Active Diagnoses:    Diagnosis Date Noted POA    PRINCIPAL PROBLEM:  Cardiac arrest [I46.9] 10/04/2019 Yes    Prolonged Q-T interval on ECG [R94.31] 06/12/2025 No    Cardiogenic shock [R57.0] 06/11/2025 No    Anoxic encephalopathy due to cardiac arrest [G93.1, I46.9] 06/11/2025 No    Acute respiratory failure with hypoxia [J96.01] 06/11/2025 No    NSTEMI (non-ST elevated myocardial infarction) [I21.4] 06/09/2025 Yes    HFrEF (heart failure with reduced ejection fraction) [I50.20] 09/13/2024 Yes    History of MI (myocardial infarction) [I25.2] 06/25/2024 Yes    ESRD (end stage renal disease) [N18.6] 02/16/2024 Yes    Paroxysmal atrial fibrillation [I48.0] 08/24/2023 Yes    Hypothyroidism [E03.9] 03/02/2023 Yes    Anemia due to chronic kidney disease, on chronic dialysis [N18.6, D63.1, Z99.2] 12/15/2021 Not Applicable    Atherosclerosis of native coronary artery of native heart with unstable angina pectoris [I25.110] 03/29/2019 Yes    Hyperlipidemia [E78.5] 06/12/2015 Yes    Primary hypertension [I10] 04/29/2013 Yes      Problems Resolved During this Admission:       VTE Risk Mitigation (From admission, onward)           Ordered     heparin (porcine) injection 5,000 Units  Every 8 hours         06/11/25 1415     IP VTE HIGH RISK PATIENT  Once         06/07/25 1155     Place sequential compression device  Until discontinued         06/07/25 1155                    Valente Moran MD  Cardiology  [facility] - Intensive Care

## 2025-06-15 NOTE — PROGRESS NOTES
Dr. Guillaume Notified of infant Blood Sugar of 30 at 1900 and . Nurse unsure of what had been communicated due to nothing being in the chart. Received orders:    [place] to feed on cue no longer than Q3H. Glucose checks before feeds. regardless of low or high, allow infant to formula feed with goal 15-20 mL. if that initial check was > 40, no need to recheck after feeding. If < 40, feed infant and then check again 30 minutes after feed to ensure responding to feed.    Regarding vital signs, please notify me for any of the following    Persistent physiologic abnormality > 4 hrs durations - Tachycardia (HR > 160) - Tachypnea (RR > 60) - Temperature instability (> 100.4°F or < 97.5°F) - Respiratory distress (grunting, flaring, or retracting) not requiring supplemental O2 Two or more physiologic abnormalities lasting for > 2 hrs - Tachycardia (HR > 160) - Tachypnea (RR > 60) - Temperature instability (> 100.4°F or < 97.5°F) - Respiratory distress (grunting, flaring, or retracting) not requiring supplemental O2    Will continue to monitor.        Nephrology Consult   Note     Consult Requested By: Juju Varela MD  Reason for Consult: ESRD     SUBJECTIVE:     ?    Review of Systems   Constitutional:  Negative for chills and fever.   Respiratory:  Negative for cough and shortness of breath.    Cardiovascular:  Negative for chest pain and leg swelling.   Gastrointestinal:  Negative for nausea.     A little bit less active today than yesterday,  patient was also confused overnight and in 1 point restriction.  But it is being discharged now since he has been calm this all morning.         OBJECTIVE:     Vital Signs (Most Recent)  Vitals:    06/15/25 0800 06/15/25 0828 06/15/25 0915 06/15/25 1115   BP: 123/69 131/77     Pulse: 82 79 76    Resp: 19  13    Temp:    98.3 °F (36.8 °C)   TempSrc:    Oral   SpO2: 100%  100%    Weight:       Height:                         Medications:   aspirin  81 mg Oral Daily    atorvastatin  40 mg Oral QHS    carvediloL  12.5 mg Oral BID    clopidogreL  75 mg Oral Daily    cyanocobalamin  1,000 mcg Subcutaneous Daily    folic acid  1 mg Oral Daily    heparin (porcine)  6,000 Units Subcutaneous Q8H    hydrALAZINE  50 mg Oral Q12H    levothyroxine  75 mcg Oral Before breakfast    LIDOcaine  1 patch Transdermal Q24H    losartan  25 mg Oral Daily    mupirocin   Nasal BID    perflutren protein-a microsphr  0.5 mL Intravenous Once    sevelamer carbonate  1,600 mg Oral TID WM             Physical Exam  Vitals and nursing note reviewed.   Constitutional:       General: He is not in acute distress.     Appearance: He is not diaphoretic.   HENT:      Head: Normocephalic and atraumatic.      Mouth/Throat:      Pharynx: No oropharyngeal exudate.   Eyes:      General: No scleral icterus.     Conjunctiva/sclera: Conjunctivae normal.      Pupils: Pupils are equal, round, and reactive to light.   Cardiovascular:      Rate and Rhythm: Normal rate and regular rhythm.      Heart sounds: Normal heart sounds. No murmur heard.  Pulmonary:       Effort: Pulmonary effort is normal. No respiratory distress.      Breath sounds: No rales.   Abdominal:      General: Bowel sounds are normal. There is no distension.      Palpations: Abdomen is soft.      Tenderness: There is no abdominal tenderness.   Musculoskeletal:         General: Normal range of motion.      Cervical back: Normal range of motion and neck supple.      Right lower leg: No edema.      Left lower leg: No edema.      Comments: LUE AVAMADA    Skin:     General: Skin is warm and dry.      Findings: No erythema.   Neurological:      Mental Status: He is alert and oriented to person, place, and time.      Cranial Nerves: No cranial nerve deficit.      Comments:     Psychiatric:         Mood and Affect: Affect normal.         Cognition and Memory: Memory normal.         Judgment: Judgment normal.         Laboratory:  Recent Labs   Lab 06/13/25  0420 06/14/25  0347 06/15/25  0354   WBC 8.25 8.18 8.40   HGB 7.4* 8.5* 9.0*   HCT 22.5* 25.6* 28.0*   * 140* 149*   MONO 10.1  0.83 13.2  1.08* 11.3  0.95     Recent Labs   Lab 06/13/25  0420 06/14/25 0347 06/15/25  0354    141 138   K 4.4 4.0 3.9    101 104   CO2 26 27 20*   BUN 45* 67* 48*   CREATININE 4.4* 5.0* 3.9*   CALCIUM 8.4* 8.5* 8.8   PHOS 4.9* 4.1 4.1       Diagnostic Results:  X-Ray: Reviewed  US: Reviewed  Echo: Reviewed    Left Ventricle: The left ventricle is mildly dilated. There is eccentric hypertrophy. Regional wall motion abnormalities present. See diagram for wall motion findings. There is mildly reduced systolic function with a visually estimated ejection fraction of 45 - 50%. There is indeterminate diastolic function.Elevated left ventricular filling pressure.    Right Ventricle: Normal right ventricular cavity size. Wall thickness is normal. Systolic function is normal.    Left Atrium: Left atrium is mildly dilated.    Right Atrium: Right atrium is mildly dilated.    Mitral Valve: There is mild to moderate  regurgitation.    Pulmonic Valve: There is moderate regurgitation.    Pulmonary Artery: There is pulmonary hypertension. The estimated pulmonary artery systolic pressure is 47 mmHg.    IVC/SVC: Intermediate venous pressure at 8 mmHg.  ASSESSMENT/PLAN:    Last Saturday was sent from his regular dialysis after completing his treatment develop chest pain.   Then had a cardiac arrest while on the floor  ESRD on HD TTS with Dr Loly Payne  in [facility]      Status post cardiac arrest.  Doing excellent considering what he went through.    Did not dialyze on Tuesday were too unstable.   Tolerated dialysis well on Wednesday Thursday  and yesterday  Resume her regular Tuesday Thursday Saturday schedule, plan for dialysis tuesday    Patient with a history of persistent hypokalemia.  But when his cardiac arrest has been his potassium was 3.9.  Both days on Wednesday and yesterday patient ran on 4K bath  Potassium today 4.4.  Magnesium is 2.1.  Had episode of AFib with RVR today.  Unlikely to be related to electrolyte disturbances    Appreciate cardiology help.  Currently on amiodarone.  Remains in ICU.       2. HTN    was hypotensive after cardiac arrest, all of the blood pressure medications on hold.  Levophed was weaned from yesterday    3. 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  Hold Epogen in the settings of the cardiac arrest  with underlying CAD  Recent Labs   Lab 06/13/25  0420 06/14/25  0347 06/15/25  0354   HGB 7.4* 8.5* 9.0*   HCT 22.5* 25.6* 28.0*   * 140* 149*       Iron   Lab Results   Component Value Date    IRON 63 06/09/2025    TIBC 232 (L) 06/09/2025    FERRITIN 2,942.0 (H) 06/11/2025       4. MBD - PTH at goal     Sevelamer with each meal- Resume  Ergocalciferol 44189 units weekly    Recent Labs   Lab 06/12/25  0753 06/12/25  1829 06/13/25  0420   MG 2.4 2.0 2.1       Lab Results   Component Value Date    .8 (H) 06/04/2025    CALCIUM 8.8  06/15/2025    CAION 1.31 07/14/2020    PHOS 4.1 06/15/2025     Lab Results   Component Value Date    ZNFRVGTC85FM 9 (L) 03/14/2024     Acidosis - correct with HD   Lab Results   Component Value Date    CO2 20 (L) 06/15/2025     Hemodialysis access-left upper arm loop AV graft.     Nutrition/Hypoalbuminemia    Recent Labs   Lab 06/14/25  0347 06/15/25  0354   ALBUMIN 2.5* 2.4*     Nepro with meals TID.       Thank you for the consult, will follow  With any question please call [phone]  Alexia Yarbrough MD    [facility]    ESPERANZA Payne MD,   MD BRANDY Arredondo MD E. V. Harmon, NP    [address]  [place], [address]  [phone]

## 2025-06-15 NOTE — PROGRESS NOTES
Pulmonary & Critical Care Medicine Progress Note    HPI:  64 year old man with history of ESRD on HD, PAD, CAD status post PCI, cardiac arrest, heart failure that presents to the ED on 06/07 for evaluation of chest pain. Patient was admitted for NSTEMI and started on heparin drip and DAPT. Course was complicated by code blue this morning, no pulse. Patient was coded accordingly to ACLS protocol, ROSC in 15 mins. Patient was intubated. Patient was transferred to ICU. Post ROSC, pupils are dilated and patient posturing. Patient was started on TTM. Patient developed recurrent cardiac arrest x2 in the ICU and ROSC in less than 5 mins.      Interval History:   Patient seen and examined at bedside.  Patient resting in bed comfortably in no acute distress.  Patient is alert and awake and oriented. Nursing reports patient went into Afib RVR overnight and had to be given an amiodarone bolus which controlled his rate. Rate is well controlled this morning.  Patient is getting hemodialysis and tolerating well.  He continues to be on amiodarone drip for his AFib which appears to be stable at this time.Patient denies ay headache, fever, chills, chest pain, abd pain, nausea, vomiting, diarrhea, constipation, hemoptysis, unusual weight loss, or night sweats.       Vital Signs:   Vitals:    06/15/25 1115   BP:    Pulse:    Resp:    Temp: 98.3 °F (36.8 °C)       Fluid Balance:     Intake/Output Summary (Last 24 hours) at 6/15/2025 1137  Last data filed at 6/15/2025 0600  Gross per 24 hour   Intake 902.11 ml   Output 1500 ml   Net -597.89 ml       Physical Exam:   Vitals reviewed.   Constitutional:       General: He is not in acute distress.     Appearance: Normal appearance. He is not ill-appearing.   HENT:      Head: Normocephalic and atraumatic.   Eyes:      Conjunctiva/sclera: Conjunctivae normal.   Cardiovascular:      Rate and Rhythm:  Normal rate present. Rhythm irregular.      Heart sounds: No murmur heard.  Pulmonary:       "Effort: Pulmonary effort is normal. No respiratory distress.      Breath sounds: No wheezing or rhonchi.   Abdominal:      General: There is no distension.      Palpations: Abdomen is soft.   Musculoskeletal:      Right lower leg: No edema.      Left lower leg: No edema.   Neurological:      General: No focal deficit present.      Mental Status: He is alert.   Psychiatric:         Mood and Affect: Mood normal.         Behavior: Behavior normal.     Laboratory Studies:   No results for input(s): "PH", "PCO2", "PO2", "HCO3", "POCSATURATED", "BE" in the last 24 hours.  Recent Labs   Lab 06/15/25  0354   WBC 8.40   RBC 3.24*   HGB 9.0*   HCT 28.0*   *   MCV 86   MCH 27.8   MCHC 32.1     Recent Labs   Lab 06/15/25  0354      K 3.9      CO2 20*   BUN 48*   CREATININE 3.9*   CALCIUM 8.8       Microbiology Data:   Microbiology Results (last 7 days)       ** No results found for the last 168 hours. **             Chest Imaging:   No new imaging.     Infusions:     amiodarone in dextrose 5%  0.5 mg/min Intravenous Continuous 16.7 mL/hr at 06/15/25 0600 0.5 mg/min at 06/15/25 0600       Scheduled Medications:    aspirin  81 mg Oral Daily    atorvastatin  40 mg Oral QHS    carvediloL  12.5 mg Oral BID    clopidogreL  75 mg Oral Daily    cyanocobalamin  1,000 mcg Subcutaneous Daily    folic acid  1 mg Oral Daily    heparin (porcine)  5,000 Units Subcutaneous Q8H    hydrALAZINE  50 mg Oral Q12H    levothyroxine  75 mcg Oral Before breakfast    LIDOcaine  1 patch Transdermal Q24H    losartan  25 mg Oral Daily    mupirocin   Nasal BID    perflutren protein-a microsphr  0.5 mL Intravenous Once    sevelamer carbonate  1,600 mg Oral TID WM       PRN Medications:     Current Facility-Administered Medications:     0.9%  NaCl infusion (for blood administration), , Intravenous, Q24H PRN    0.9%  NaCl infusion (for blood administration), , Intravenous, Q24H PRN    dextrose 50%, 12.5 g, Intravenous, PRN    dextrose 50%, " 25 g, Intravenous, PRN    glucagon (human recombinant), 1 mg, Intramuscular, PRN    glucose, 16 g, Oral, PRN    glucose, 24 g, Oral, PRN    HYDROmorphone, 0.5 mg, Intravenous, Q4H PRN    naloxone, 0.02 mg, Intravenous, PRN    oxyCODONE, 5 mg, Oral, Q6H PRN    sodium chloride 0.9%, 10 mL, Intravenous, Q12H PRN    Assessment & Plan:   Acute Encephalopathy  CT head showed enlargement of the superior ophthalmic vein bilaterally but there is no additional evidence for acute intracranial process   Likely due to anoxic brain injury  Status post TTM  Improved mentation  Delirium precautions     Cardiac arrest  Multiple cardiac arrest in patient with extensive CAD history  CAD  Shock, likely cardiogenic shock  Ventricular Tachycardia  TTE showed LVEF of 30-35%, global hypokinesis present with some regional variability, septal motion is consistent with bundle branch block     Off Levophed  Continue amiodarone  Cardiology following  Continue GDMT will add low-dose losartan today    Atrial fib with RVR  Continue amiodarone drip  Cardiology following    Acute Hypoxemia Respiratory Failure  CXR showed bilateral interstitial infiltrates  Pulmonary edema  Patient is status post extubation on 06/11 to BiPAP  Continue oxygen therapy during the day  BiPAP at night  Wean FiO2 as tolerated     Hypokalemia, improved  Continue to monitor     ESRD  Dialysis per nephrology  Nephrology following     Normocytic anemia  Due to folic acid, B12 def and CKD  B12 and folate acid repletion  Transfuse 1 unit RBCs today          Feeding/fluids: Full liquid diet  Analgesia: Dilaudid as needed  Sedation: N/A  Thromboprophylaxis: Heparin  Head up position: Yes  Ulcer prophylaxis: N/A  Glycemic control: Yes  Spontaneous breathing trial: N/A  Bowel care: Yes  Indwelling catheter removal: Peripheral line, HD AV fistula left upper arm  Deescalation of antibiotics: N/A         Code: Full        Disposition: Possible stepdown today        Patient was seen  "with the attending physician MD Nikunj Conner, DO  Pulmonology  [place] - Intensive Care    Pt seen and examined with Pulmonary/Critical Care team and this note reviewed and validated with the following additional comments: More animated.  Appetitie much better.  Q: "What year is it?" A: "2021"  Recurrent A-fib.  Reloaded with amiodarone, carvedilol dose increased.Starting anticoagulation.    The complexity of Medical Decision Making (MDM) was high.  The number of problems addressed was 4.  The risk of complications and/or morbidity/mortality was high.  The tests ordered were PTT.  I communicated with the following providers Cardiology.  Time spent in the care of this patient was 50 minutes.    Mickey Doe MD  Phone [phone]           "

## 2025-06-15 NOTE — SUBJECTIVE & OBJECTIVE
Interval History:     Able to speak somewhat.  Wishing for a Coke.  Appetite has improved.  Stepping down to the floor today.  Seen by cardiology and ICU.  Brief episodes of AFib overnight.    Review of Systems   Constitutional:  Positive for appetite change and fatigue.   HENT:  Negative for ear discharge and ear pain.    Eyes:  Negative for pain and redness.   Respiratory:  Negative for cough and shortness of breath.    Cardiovascular:  Positive for chest pain.   Gastrointestinal:  Negative for blood in stool and constipation.   Endocrine: Negative for polydipsia and polyphagia.   Genitourinary:  Negative for genital sores and hematuria.   Musculoskeletal:  Positive for myalgias. Negative for joint swelling.   Skin:  Negative for rash.   Neurological:  Negative for seizures and numbness.   Psychiatric/Behavioral:  Negative for decreased concentration and dysphoric mood.      Objective:     Vital Signs (Most Recent):  Temp: 98.3 °F (36.8 °C) (06/15/25 1115)  Pulse: 76 (06/15/25 0915)  Resp: 13 (06/15/25 0915)  BP: 131/77 (06/15/25 0828)  SpO2: 100 % (06/15/25 0915) Vital Signs (24h Range):  Temp:  [97.6 °F (36.4 °C)-100.1 °F (37.8 °C)] 98.3 °F (36.8 °C)  Pulse:  [] 76  Resp:  [11-26] 13  SpO2:  [97 %-100 %] 100 %  BP: ()/() 131/77     Weight: 81.6 kg (179 lb 14.3 oz)  Body mass index is 25.81 kg/m².    Intake/Output Summary (Last 24 hours) at 6/15/2025 1301  Last data filed at 6/15/2025 0600  Gross per 24 hour   Intake 902.11 ml   Output 1500 ml   Net -597.89 ml         Physical Exam  Constitutional:       General: He is not in acute distress.     Appearance: Normal appearance. He is not ill-appearing.      Comments: More garbled speech.  Able to communicate better than previous.   HENT:      Head: Normocephalic and atraumatic.      Right Ear: There is no impacted cerumen.      Left Ear: Tympanic membrane normal. There is no impacted cerumen.      Nose: No congestion or rhinorrhea.       Mouth/Throat:      Pharynx: No oropharyngeal exudate or posterior oropharyngeal erythema.   Eyes:      General:         Right eye: No discharge.         Left eye: No discharge.   Cardiovascular:      Rate and Rhythm: Normal rate.      Heart sounds: No murmur heard.     No gallop.   Abdominal:      Tenderness: There is no abdominal tenderness. There is no guarding or rebound.      Hernia: No hernia is present.   Musculoskeletal:         General: No deformity.      Cervical back: No rigidity.      Right lower leg: No edema.      Comments: Chest tenderness to palpation   Lymphadenopathy:      Cervical: No cervical adenopathy.   Skin:     Findings: No bruising or lesion.   Neurological:      Mental Status: He is alert.      Motor: No weakness.      Gait: Gait normal.   Psychiatric:         Mood and Affect: Mood normal.         Behavior: Behavior normal.               Significant Labs: All pertinent labs within the past 24 hours have been reviewed.  BMP:   Recent Labs   Lab 06/15/25  0354         K 3.9      CO2 20*   BUN 48*   CREATININE 3.9*   CALCIUM 8.8     CBC:   Recent Labs   Lab 06/14/25  0347 06/15/25  0354   WBC 8.18 8.40   HGB 8.5* 9.0*   HCT 25.6* 28.0*   * 149*     CMP:   Recent Labs   Lab 06/14/25  0347 06/15/25  0354    138   K 4.0 3.9    104   CO2 27 20*   * 102   BUN 67* 48*   CREATININE 5.0* 3.9*   CALCIUM 8.5* 8.8   ALBUMIN 2.5* 2.4*   ANIONGAP 13 14       Significant Imaging: I have reviewed all pertinent imaging results/findings within the past 24 hours.  I have reviewed and interpreted all pertinent imaging results/findings within the past 24 hours.

## 2025-06-15 NOTE — ASSESSMENT & PLAN NOTE
Patient with chest pain worsened while on HD  Trops are flat 0.14  EKG with no ischemic changes  Echo with worsening wall motion as per cardio  Seen by cardiology, plan was for Left heart cath 06/09 but patient had a cardiac arrest that morning; managing medically for now  - continue ASA/plavix/statin  - Cardiology following        Patient with chest pain worsened while on HD  Trops are flat 0.14  EKG with no ischemic changes  Echo with worsening wall motion as per cardio  Seen by cardiology, plan was for Left heart cath 06/09 but patient had a cardiac arrest that morning; managing medically for now  - continue ASA/plavix/statin  - Cardiology following      Patient with chest pain worsened while on HD  Trops are flat 0.14  EKG with no ischemic changes  Echo with worsening wall motion as per cardio  Seen by cardiology, plan was for Left heart cath 06/09 but patient had a cardiac arrest that morning; managing medically for now  - continue ASA/plavix/statin  - Cardiology following        Patient with chest pain worsened while on HD  Trops are flat 0.14  EKG with no ischemic changes  Echo with worsening wall motion as per cardio  Seen by cardiology, plan was for Left heart cath 06/09 but patient had a cardiac arrest that morning; managing medically for now  - continue ASA/plavix/statin  - Cardiology following

## 2025-06-15 NOTE — ASSESSMENT & PLAN NOTE
Patient has long standing persistent (>12 months) atrial fibrillation. Patient is currently in sinus rhythm. [id number] Score: 1. The patients heart rate in the last 24 hours is as follows:  Pulse  Min: 74  Max: 136     Antiarrhythmics  amiodarone 360 mg/200 mL (1.8 mg/mL) infusion, Continuous, Intravenous    Anticoagulants  heparin (porcine) injection 6,000 Units, Every 8 hours, Subcutaneous    Plan  - Replete lytes with a goal of K>4, Mg >2  - Patient is anticoagulated, see medications listed above.  - Patient's afib is currently controlled

## 2025-06-15 NOTE — ASSESSMENT & PLAN NOTE
>>ASSESSMENT AND PLAN FOR ESRD (END STAGE RENAL DISEASE) WRITTEN ON 6/7/2025  5:21 PM BY ERICH FRAZIER MD    Creatine stable for now. BMP reviewed- noted Estimated Creatinine Clearance: 20 mL/min (A) (based on SCr of 3.9 mg/dL (H)). according to latest data. Based on current GFR, CKD stage is end stage.  Monitor UOP and serial BMP and adjust therapy as needed. Renally dose meds. Avoid nephrotoxic medications and procedures.    ESRD On HD,

## 2025-06-15 NOTE — EICU
Intervention Initiated From:  [facility] / [facility]    Margie intervened regarding:  Rounding (Video assessment)    VICU Night Rounds Checklist  24H Vital Sign Range:  Temp:  [97.7 °F (36.5 °C)-98.5 °F (36.9 °C)]   Pulse:  []   Resp:  [16-37]   BP: (127-192)/(62-98)   SpO2:  [93 %-100 %]     Video rounds

## 2025-06-15 NOTE — EICU
Virtual ICU Quality Rounds    Admit Date: 6/7/2025  Hospital Day: 8    ICU Day: 6d 4h    24H Vital Sign Range:  Temp:  [97.6 °F (36.4 °C)-100.1 °F (37.8 °C)]   Pulse:  []   Resp:  [11-28]   BP: ()/()   SpO2:  [96 %-100 %]     VICU Surveillance Screening                    LDA reconciliation : Yes

## 2025-06-15 NOTE — NURSING
Monitor showing vtach, 's-140's. EKG done showing wide complex tachycardia. EKG sent to Dr. Moran. Orders to bolus amio and start gtt after bolus.

## 2025-06-15 NOTE — ASSESSMENT & PLAN NOTE
Anemia is likely due to chronic disease due to ESRD. Most recent hemoglobin and hematocrit are listed below.  Recent Labs     06/13/25  0420 06/14/25  0347 06/15/25  0354   HGB 7.4* 8.5* 9.0*   HCT 22.5* 25.6* 28.0*     Plan  - Monitor serial CBC: Daily  - Transfuse PRBC if patient becomes hemodynamically unstable, symptomatic or H/H drops below 7/21.  - Patient has not received any PRBC transfusions to date.  - Patient's anemia is currently stable

## 2025-06-15 NOTE — EICU
Intervention Initiated From:  [facility] / [facility]    Margie intervened regarding:  Rounding (Video assessment)  VICU Night Rounds Checklist  24H Vital Sign Range:  Temp:  [97.6 °F (36.4 °C)-100.1 °F (37.8 °C)]   Pulse:  []   Resp:  [10-24]   BP: ()/()   SpO2:  [95 %-100 %]     Video rounds

## 2025-06-16 NOTE — ASSESSMENT & PLAN NOTE
- history of PAF with EKG in 2023 with PAF; EKGs this admission with NSR; intermittent recurrent episodes of afib with RVR throughout the weekend  - back on IV Amiodarone after load; initial EKG this AM with NSR: repeat EKG ?atrial fibrillation; rate controlled   - will continue IV Amidoarone throughout today and plan to possibly transition to oral tomorrow as long as RVR controlled   - on Coreg 12.5mg po BID and will transition to Toprol XL given marginal BP; considered IV Heparin drip but held off initially due to anemia; on high dose SQ Heparin today not opposed to transition to IV drip since unable to use Lovenox given ESRD; once Eliquis started will stop ASA    - monitor on telemetry

## 2025-06-16 NOTE — SUBJECTIVE & OBJECTIVE
Interval History:     I have seen and examined the patient today  Patient is awake and alert but not oriented    Review of Systems   Unable to perform ROS: Mental status change     Objective:     Vital Signs (Most Recent):  Temp: 98.3 °F (36.8 °C) (06/16/25 0715)  Pulse: 81 (06/16/25 0800)  Resp: 15 (06/16/25 0800)  BP: (!) 115/54 (06/16/25 0905)  SpO2: 100 % (06/16/25 0800) Vital Signs (24h Range):  Temp:  [97.6 °F (36.4 °C)-98.3 °F (36.8 °C)] 98.3 °F (36.8 °C)  Pulse:  [74-90] 81  Resp:  [10-28] 15  SpO2:  [95 %-100 %] 100 %  BP: ()/(53-78) 115/54     Weight: 81.6 kg (179 lb 14.3 oz)  Body mass index is 25.81 kg/m².    Intake/Output Summary (Last 24 hours) at 6/16/2025 1305  Last data filed at 6/16/2025 0200  Gross per 24 hour   Intake 216.64 ml   Output 100 ml   Net 116.64 ml         Physical Exam  Constitutional:       Appearance: He is ill-appearing.   HENT:      Head: Normocephalic and atraumatic.   Cardiovascular:      Rate and Rhythm: Normal rate.   Pulmonary:      Effort: Pulmonary effort is normal.      Breath sounds: Normal breath sounds.   Skin:     General: Skin is warm.   Neurological:      Mental Status: He is alert. He is disoriented.               Significant Labs: All pertinent labs within the past 24 hours have been reviewed.    Significant Imaging: I have reviewed all pertinent imaging results/findings within the past 24 hours.

## 2025-06-16 NOTE — ASSESSMENT & PLAN NOTE
Patient's blood pressure range in the last 24 hours was: BP  Min: 92/54  Max: 146/65.The patient's inpatient anti-hypertensive regimen is listed below:  Current Antihypertensives  losartan tablet 25 mg, Daily, Oral  metoprolol succinate (TOPROL-XL) 24 hr tablet 100 mg, 2 times daily, Oral    Plan  - BP is controlled, no changes needed to their regimen  - holding antihypertensives in setting of arrest/shock

## 2025-06-16 NOTE — ASSESSMENT & PLAN NOTE
Anemia is likely due to chronic disease due to ESRD. Most recent hemoglobin and hematocrit are listed below.  Recent Labs     06/15/25  0354 06/16/25  0353 06/16/25  0924   HGB 9.0* 8.3* 8.2*   HCT 28.0* 25.5* 25.0*     Plan  - Monitor serial CBC: Daily  - Transfuse PRBC if patient becomes hemodynamically unstable, symptomatic or H/H drops below 7/21.  - Patient has not received any PRBC transfusions to date.  - Patient's anemia is currently stable

## 2025-06-16 NOTE — PLAN OF CARE
Problem: Adult Inpatient Plan of Care  Goal: Plan of Care Review  Outcome: Progressing  Goal: Patient-Specific Goal (Individualized)  Outcome: Progressing  Goal: Absence of Hospital-Acquired Illness or Injury  Outcome: Progressing  Goal: Optimal Comfort and Wellbeing  Outcome: Progressing  Goal: Readiness for Transition of Care  Outcome: Progressing     Patient alert and oriented to person and place. Afebrile. Amiodarone drip at 0.5 mg/min continued. Currently in normal sinus rhythm with occasional PVCs. Blood pressure and oxygen saturation remain stable on room air.  Patient declined BIPAP overnight. No bowel movement noted overnight. U/O of 100 mL. Patient safety maintained; Plan of care reviewed with patient. Care continues.

## 2025-06-16 NOTE — PLAN OF CARE
Problem: Physical Therapy  Goal: Physical Therapy Goal  Description: Goals to be met by: 2025    Patient will increase functional independence with mobility by performin. Sit<>stand with CGA with RW.  2. Gait x 50 feet with RW with SBA.  3. Supine<>sit with CGA.      Outcome: Progressing     PT/OT co-session to attempt safe progression of mobility; limited to bed mobility due to decline in BP and elevated HR. Therapy will continue to progress pt as able.

## 2025-06-16 NOTE — ASSESSMENT & PLAN NOTE
Patient's most recent potassium results are listed below.   Recent Labs     06/14/25  0347 06/15/25  0354 06/16/25  0353   K 4.0 3.9 3.4*     Plan  - Replete potassium per protocol  - Monitor potassium Daily  - Patient's hypokalemia is stable  -oral potassium supplement is given

## 2025-06-16 NOTE — PLAN OF CARE
Problem: SLP  Goal: SLP Goal  Description: Updated Short Term Goals:  1. Ongoing swallow eval to determine safest diet level. - MET 6/14  2. Pt tolerate FULL liquids with no audible dysphagia signs.  MET 6/14  3. Pt will participate in informal cognitive/speech/lang eval- MET 6/16  4. Pt will consume minced/moist diet and thin liquids with no audible dysphagia signs- MET 6/16  5. Pt will state basic orientation with min cues. - MET 6/16  6. Pt will answer general questions with audible yes/no response ongoing   7. Pt will attend to structured tasks with mod cues. - ongoing   8. Pt will utilize speech strategies to be understood by staff including: making eye contact, speak loudly, clearly and over-articulate with mod cues.   Outcome: Progressing   Pt making progress this date, answering questions within timely manner, making eye contact and more attentive to tasks. Pt requesting hamburger for lunch. SLP able to advance diet and will monitor to ensure safety.

## 2025-06-16 NOTE — EICU
eICU Physician Virtual/Remote Brief Evaluation Note      Telephone call bedside RN   Potassium 3.4.  She wants to know if I want to replace potassium  Chart reviewed, discussed with RN   BP 95/51 (66), P 79-NSR, R 16, O2 sat 100  Creatinine 5.5, increased from 3.9  Patient has been followed by Nephrology   Will defer management of potassium to Nephrology      JOSUÉ Be MD  [facility] Attending  [phone]    This report has been created through the use of [employer] dictation software. Typographical and content errors may occur with this process. While efforts are made to detect and correct such errors, in some cases errors will persist. For this reason, wording in this document should be considered in the proper context and not strictly verbatim

## 2025-06-16 NOTE — PT/OT/SLP PROGRESS
Occupational Therapy   Treatment    Name: Domingo Gross  MRN: [id number]  Admitting Diagnosis:  Cardiac arrest       Recommendations:     Discharge Recommendations: High Intensity Therapy  Discharge Equipment Recommendations:  to be determined by next level of care  Barriers to discharge:  Other (Comment) (medical acuity; increased assist; decline in function; elevated heartrate and low BP)    Assessment:     Domingo Gross is a 64 y.o. male with a medical diagnosis of Cardiac arrest.  He presents with ..The primary encounter diagnosis was NSTEMI (non-ST elevated myocardial infarction). Diagnoses of Chest pain, HFrEF (heart failure with reduced ejection fraction), Hypokalemia, ESRD (end stage renal disease), Unstable angina pectoris, Elevated troponin, Cardiac arrest, Acute hypoxemic respiratory failure, Prolonged Q-T interval on ECG, and Anoxic encephalopathy due to cardiac arrest were also pertinent to this visit.  . Performance deficits affecting function are weakness, impaired endurance, impaired self care skills, impaired functional mobility, gait instability, impaired balance, pain, impaired cardiopulmonary response to activity, decreased safety awareness, decreased coordination, impaired cognition, decreased upper extremity function, decreased lower extremity function.     Continue OT POC. Session limited this date 2/2 elevated heartrate and low BP; however, patient remains motivated and reports being fully independent prior to admission.     Rehab Prognosis:  Good; patient would benefit from acute skilled OT services to address these deficits and reach maximum level of function.       Plan:     Patient to be seen 5 x/week to address the above listed problems via self-care/home management, therapeutic activities, therapeutic exercises  Plan of Care Expires: 07/14/25  Plan of Care Reviewed with: patient    Subjective     Chief Complaint: no complaints, denies any adverse symptoms with exception of states  has a cough occasionally (which he self initiates pillow splinting)  Patient/Family Comments/goals: eager and willing to try sitting up on side of bed  Pain/Comfort:  Pain Rating 1: 0/10  Pain Addressed 1: Reposition  Pain Rating Post-Intervention 1: 0/10    Objective:     Communicated with: nursing prior to session.  Patient found HOB elevated with blood pressure cuff, telemetry, pulse ox (continuous), Other (comments) (ICU monitoring) upon OT entry to room.    General Precautions: Standard, fall    Orthopedic Precautions:N/A  Braces: N/A  Respiratory Status: Room air     Occupational Performance:     Bed Mobility:    Patient completed Supine to Sit with minimal assist x2 persons, line management assist, min verbal cues to move slowly and once in seated, minimal assist to scoot to eob and forward visually gaze ahead  Patient completed Sit to Supine with minimal assist x2     Functional Mobility/Transfers:  Functional Mobility: eob sitting for ~ 90 seconds, CGA, verbal cues to terminate crossing legs and sit more erect    Activities of Daily Living:  Grooming: initiation/ prompting cues ; min assist hair combing  Upper Body Dressing: moderate assistance ; doff / don new gown; multi  lines      AMPAC 6 Click ADL: 9    Treatment & Education:  Patient re-educated on role of OT. Cotx with PT for safety/efficacy.  Patient oriented to self, place, month, requires min cues/ extra time to discern year.  Bed mobility/ ADL  training as above.Instructed to move slowly.  Denies any adverse s/s during movement efforts.  Supine start BP with HOB elevated at 97/50. Drops to 87/53 during questions/ orientation in supine HOB elevated. Seated BP at 72/48. Return supine at 83/53.  Heartrate variable at 111-126 bpm, however, per monitor noted run of Afib and Vtach (ICU nurse in room for portion of session). Highest heartrate reading per monitor is briefly at 130 bpm sitting eob.  Upon return supine, heartrate in 110s.   Noted patient  with wet/ soiled gown; assisted for changing gown.  End of session, needs in reach.  Answered inquiries in scope.              Patient left HOB elevated with all lines intact, call button in reach, and nursing notified    GOALS:   Multidisciplinary Problems       Occupational Therapy Goals          Problem: Occupational Therapy    Goal Priority Disciplines Outcome Interventions   Occupational Therapy Goal     OT, PT/OT Progressing    Description: Goals to be met by: 07/14     Patient will increase functional independence with ADLs by performing:    UE Dressing with Stand-by Assistance.  LE Dressing with Stand-by Assistance.  Grooming while seated at sink with Stand-by Assistance.  Toileting from toilet with Stand-by Assistance for hygiene and clothing management.   Toilet transfer to toilet with Contact Guard Assistance.  Increased functional strength to WFL for ADLs.                         DME Justifications:tbd      Time Tracking:     OT Date of Treatment: 06/16/25  OT Start Time: 1438  OT Stop Time: 1457  OT Total Time (min): 19 min total time    Billable Minutes:Self Care/Home Management 15 min    OT/AMANDA: OT          6/16/2025

## 2025-06-16 NOTE — ASSESSMENT & PLAN NOTE
Patient has long standing persistent (>12 months) atrial fibrillation. Patient is currently in sinus rhythm. [id number] Score: 1. The patients heart rate in the last 24 hours is as follows:  Pulse  Min: 74  Max: 90     Antiarrhythmics  amiodarone 360 mg/200 mL (1.8 mg/mL) infusion, Continuous, Intravenous  metoprolol succinate (TOPROL-XL) 24 hr tablet 100 mg, 2 times daily, Oral    Anticoagulants  heparin 25,000 units in dextrose 5% 250 mL (100 units/mL) infusion LOW INTENSITY nomogram - [facility], Continuous, Intravenous  heparin 25,000 units in dextrose 5% (100 units/ml) IV bolus from bag LOW INTENSITY nomogram - [facility], As needed (PRN), Intravenous  heparin 25,000 units in dextrose 5% (100 units/ml) IV bolus from bag LOW INTENSITY nomogram - [facility], As needed (PRN), Intravenous    Plan  - Replete lytes with a goal of K>4, Mg >2  - Patient is anticoagulated, see medications listed above.  - Patient's afib is currently controlled

## 2025-06-16 NOTE — PROGRESS NOTES
[facility] Medicine  Progress Note    Patient Name: Domigno Gross  MRN: [id number]  Patient Class: IP- Inpatient   Admission Date: 6/7/2025  Length of Stay: 9 days  Attending Physician: Nima Ervin MD  Primary Care Provider: Geeta Coffey MD        Subjective     Principal Problem:Cardiac arrest        HPI:  64 y.o. male with PMH ESRD on HD, PAD with multiple interventions, CAD (mid LAD/prox RCA PCI 3/2019 and prox LCA in 10/2019 following out of hospital cardiac arrest), after cardiac arrest in setting of prox LCX occlusion treated with PCI, 9/16/2024 s/p intervention w cutting/shockwave PTCA to Lcx, 01/24/25 PCI of OM, new reduction in EF presents to ER with substernal chest pain that radiates to his throat. Report his chest pain feels like heaviness as if something is heavy sitting on his chest, started last night at rest, he took sublingual nitro which only slightly helped but he was still feeling it, today as he was having HD the pain got worse so he came to the ED.    Patient denies smoking or alcohol use        Overview/Hospital Course:  No notes on file    Interval History:     I have seen and examined the patient today  Patient is awake and alert but not oriented    Review of Systems   Unable to perform ROS: Mental status change     Objective:     Vital Signs (Most Recent):  Temp: 98.3 °F (36.8 °C) (06/16/25 0715)  Pulse: 81 (06/16/25 0800)  Resp: 15 (06/16/25 0800)  BP: (!) 115/54 (06/16/25 0905)  SpO2: 100 % (06/16/25 0800) Vital Signs (24h Range):  Temp:  [97.6 °F (36.4 °C)-98.3 °F (36.8 °C)] 98.3 °F (36.8 °C)  Pulse:  [74-90] 81  Resp:  [10-28] 15  SpO2:  [95 %-100 %] 100 %  BP: ()/(53-78) 115/54     Weight: 81.6 kg (179 lb 14.3 oz)  Body mass index is 25.81 kg/m².    Intake/Output Summary (Last 24 hours) at 6/16/2025 1305  Last data filed at 6/16/2025 0200  Gross per 24 hour   Intake 216.64 ml   Output 100 ml   Net 116.64 ml         Physical  Exam  Constitutional:       Appearance: He is ill-appearing.   HENT:      Head: Normocephalic and atraumatic.   Cardiovascular:      Rate and Rhythm: Normal rate.   Pulmonary:      Effort: Pulmonary effort is normal.      Breath sounds: Normal breath sounds.   Skin:     General: Skin is warm.   Neurological:      Mental Status: He is alert. He is disoriented.               Significant Labs: All pertinent labs within the past 24 hours have been reviewed.    Significant Imaging: I have reviewed all pertinent imaging results/findings within the past 24 hours.      Assessment & Plan  Cardiac arrest  Patient had cardiac arrest requiring ACLS x3 on 6/9  Remarkable improvement  - extubated on 06/11.  Tolerating well  - Pulmonology and Cardiology following    Anoxic encephalopathy due to cardiac arrest  -improving but still disoriented,  possible delirium    NSTEMI (non-ST elevated myocardial infarction)  History of MI (myocardial infarction)  Patient with chest pain worsened while on HD  Trops are flat 0.14  EKG with no ischemic changes  Echo with worsening wall motion as per cardio  Seen by cardiology, plan was for Left heart cath 06/09 but patient had a cardiac arrest that morning; managing medically for now  - continue ASA/plavix/statin  - Cardiology following        Patient with chest pain worsened while on HD  Trops are flat 0.14  EKG with no ischemic changes  Echo with worsening wall motion as per cardio  Seen by cardiology, plan was for Left heart cath 06/09 but patient had a cardiac arrest that morning; managing medically for now  - continue ASA/plavix/statin  - Cardiology following      Patient with chest pain worsened while on HD  Trops are flat 0.14  EKG with no ischemic changes  Echo with worsening wall motion as per cardio  Seen by cardiology, plan was for Left heart cath 06/09 but patient had a cardiac arrest that morning; managing medically for now  - continue ASA/plavix/statin  - Cardiology  following        Patient with chest pain worsened while on HD  Trops are flat 0.14  EKG with no ischemic changes  Echo with worsening wall motion as per cardio  Seen by cardiology, plan was for Left heart cath 06/09 but patient had a cardiac arrest that morning; managing medically for now  - continue ASA/plavix/statin  - Cardiology following      Patient with chest pain worsened while on HD  Trops are flat 0.14  EKG with no ischemic changes  Echo with worsening wall motion as per cardio  Seen by cardiology, plan was for Left heart cath 06/09 but patient had a cardiac arrest that morning; managing medically for now  - continue ASA/plavix/statin > if patient tolerate heparin drip, we will transition to a DOAC then can stop aspirin, continue Plavix  - Cardiology following        Patient with chest pain worsened while on HD  Trops are flat 0.14  EKG with no ischemic changes  Echo with worsening wall motion as per cardio  Seen by cardiology, plan was for Left heart cath 06/09 but patient had a cardiac arrest that morning; managing medically for now  - continue ASA/plavix/statin  - Cardiology following      Patient with chest pain worsened while on HD  Trops are flat 0.14  EKG with no ischemic changes  Echo with worsening wall motion as per cardio  Seen by cardiology, plan was for Left heart cath 06/09 but patient had a cardiac arrest that morning; managing medically for now  - continue ASA/plavix/statin  - Cardiology following        Patient with chest pain worsened while on HD  Trops are flat 0.14  EKG with no ischemic changes  Echo with worsening wall motion as per cardio  Seen by cardiology, plan was for Left heart cath 06/09 but patient had a cardiac arrest that morning; managing medically for now  - continue ASA/plavix/statin  - Cardiology following      Cardiogenic shock  This patient has shock. The type of shock is cardiogenic due to ischemic. The patient has the following evidence of shock: persistent hypotension  and BRETT. The patient will be admitted to an intensive care unit, they will be treated with levophed; now de-escalated.  - cardiogenic shock has now resolved    HFrEF (heart failure with reduced ejection fraction)  - likely ischemic  - volume control with dialysis    Repeat echo showed   Left Ventricle: The left ventricle is moderately dilated. Mildly increased wall thickness. There is eccentric hypertrophy. Regional wall motion abnormalities present. See diagram for wall motion findings. There is moderately reduced systolic function with a visually estimated ejection fraction of 35 - 40%. Quantitated ejection fraction is 38%. Unable to assess diastolic function due to poor image quality.    Right Ventricle: The right ventricle is normal in size Systolic function is normal. TAPSE is 2.3 cm.    Overall the study quality was technically difficult.  Paroxysmal atrial fibrillation  Patient has long standing persistent (>12 months) atrial fibrillation. Patient is currently in sinus rhythm. [id number] Score: 1. The patients heart rate in the last 24 hours is as follows:  Pulse  Min: 74  Max: 90     Antiarrhythmics  amiodarone 360 mg/200 mL (1.8 mg/mL) infusion, Continuous, Intravenous  metoprolol succinate (TOPROL-XL) 24 hr tablet 100 mg, 2 times daily, Oral    Anticoagulants  heparin 25,000 units in dextrose 5% 250 mL (100 units/mL) infusion LOW INTENSITY nomogram - [facility], Continuous, Intravenous  heparin 25,000 units in dextrose 5% (100 units/ml) IV bolus from bag LOW INTENSITY nomogram - [facility], As needed (PRN), Intravenous  heparin 25,000 units in dextrose 5% (100 units/ml) IV bolus from bag LOW INTENSITY nomogram - [facility], As needed (PRN), Intravenous    Plan  - Replete lytes with a goal of K>4, Mg >2  - Patient is anticoagulated, see medications listed above.  - Patient's afib is currently controlled    Acute respiratory failure with hypoxia  Resolved, currently on room air  ESRD (end stage renal disease)   >>ASSESSMENT AND  PLAN FOR ESRD (END STAGE RENAL DISEASE) WRITTEN ON 6/7/2025  5:21 PM BY ERICH FRAZIER MD    Creatine stable for now. BMP reviewed- noted Estimated Creatinine Clearance: 14.2 mL/min (A) (based on SCr of 5.5 mg/dL (H)). according to latest data. Based on current GFR, CKD stage is end stage.  Monitor UOP and serial BMP and adjust therapy as needed. Renally dose meds. Avoid nephrotoxic medications and procedures.    ESRD On HD,  Primary hypertension  Patient's blood pressure range in the last 24 hours was: BP  Min: 92/54  Max: 146/65.The patient's inpatient anti-hypertensive regimen is listed below:  Current Antihypertensives  losartan tablet 25 mg, Daily, Oral  metoprolol succinate (TOPROL-XL) 24 hr tablet 100 mg, 2 times daily, Oral    Plan  - BP is controlled, no changes needed to their regimen  - holding antihypertensives in setting of arrest/shock    Hyperlipidemia  -Continue statin  Anemia due to chronic kidney disease, on chronic dialysis  Anemia is likely due to chronic disease due to ESRD. Most recent hemoglobin and hematocrit are listed below.  Recent Labs     06/15/25  0354 06/16/25  0353 06/16/25  0924   HGB 9.0* 8.3* 8.2*   HCT 28.0* 25.5* 25.0*     Plan  - Monitor serial CBC: Daily  - Transfuse PRBC if patient becomes hemodynamically unstable, symptomatic or H/H drops below 7/21.  - Patient has not received any PRBC transfusions to date.  - Patient's anemia is currently stable    Hypothyroidism  Cont levothyroxine     Atherosclerosis of native coronary artery of native heart with unstable angina pectoris  Patient with known CAD s/p stent placement, which is controlled Will continue ASA, Plavix, and Statin and monitor for S/Sx of angina/ACS. Continue to monitor on telemetry.   Prolonged Q-T interval on ECG  -continue to monitor    Hypokalemia  Patient's most recent potassium results are listed below.   Recent Labs     06/14/25  0347 06/15/25  0354 06/16/25  0353   K 4.0 3.9 3.4*     Plan  - Replete  potassium per protocol  - Monitor potassium Daily  - Patient's hypokalemia is stable  -oral potassium supplement is given  VTE Risk Mitigation (From admission, onward)           Ordered     heparin 25,000 units in dextrose 5% (100 units/ml) IV bolus from bag LOW INTENSITY nomogram - [facility]  As needed (PRN)        Question:  Heparin Infusion Adjustment (DO NOT MODIFY ANSWER)  Answer:  [email or web address]\Images\Pharmacy\HeparinInfusions\heparin LOW INTENSITY nomogram for [facility] [id number]    06/16/25 0908     heparin 25,000 units in dextrose 5% (100 units/ml) IV bolus from bag LOW INTENSITY nomogram - [facility]  As needed (PRN)        Question:  Heparin Infusion Adjustment (DO NOT MODIFY ANSWER)  Answer:  [email or web address]\Images\Pharmacy\HeparinInfusions\heparin LOW INTENSITY nomogram for [facility] [id number]    06/16/25 0908     heparin 25,000 units in dextrose 5% 250 mL (100 units/mL) infusion LOW INTENSITY nomogram - [facility]  Continuous        Question:  Begin at (units/kg/hr)  Answer:  12    06/16/25 0908     IP VTE HIGH RISK PATIENT  Once         06/07/25 1155     Place sequential compression device  Until discontinued         06/07/25 1155                    Discharge Planning   FLACO: 6/19/2025     Code Status: Full Code   Medical Readiness for Discharge Date:   Discharge Plan A: Rehab   Discharge Delays:  (pending medical stability)        Critical care time spent on the evaluation and treatment of severe organ dysfunction, review of pertinent labs and imaging studies, discussions with consulting providers and discussions with patient/family: 25 minutes.    Please place Justification for DME        Nima Ervin MD  Department of Hospital Medicine   [place] - Intensive Care

## 2025-06-16 NOTE — ASSESSMENT & PLAN NOTE
- extensive PCI history with LAD and RCA PCI 3/38462, LCX 2019, LAD REZA 2023, LCX PCI 5/2023, LCX PTCA lithotripsy 9/2024 LCX PTCA 1/2025  -  presented with chest pain with concern for USA/NSTEMI LHC deferred due to cardiac arrest  - plan for medical management for now with DAPT, statin ARB and BB; will plan to continue medical management for now; no plans for invasive strategy; patient very debilitated and recovering from acute event; once Eliquis started will stop ASA but continue Plavix

## 2025-06-16 NOTE — ASSESSMENT & PLAN NOTE
Patient with chest pain worsened while on HD  Trops are flat 0.14  EKG with no ischemic changes  Echo with worsening wall motion as per cardio  Seen by cardiology, plan was for Left heart cath 06/09 but patient had a cardiac arrest that morning; managing medically for now  - continue ASA/plavix/statin  - Cardiology following        Patient with chest pain worsened while on HD  Trops are flat 0.14  EKG with no ischemic changes  Echo with worsening wall motion as per cardio  Seen by cardiology, plan was for Left heart cath 06/09 but patient had a cardiac arrest that morning; managing medically for now  - continue ASA/plavix/statin  - Cardiology following      Patient with chest pain worsened while on HD  Trops are flat 0.14  EKG with no ischemic changes  Echo with worsening wall motion as per cardio  Seen by cardiology, plan was for Left heart cath 06/09 but patient had a cardiac arrest that morning; managing medically for now  - continue ASA/plavix/statin  - Cardiology following        Patient with chest pain worsened while on HD  Trops are flat 0.14  EKG with no ischemic changes  Echo with worsening wall motion as per cardio  Seen by cardiology, plan was for Left heart cath 06/09 but patient had a cardiac arrest that morning; managing medically for now  - continue ASA/plavix/statin  - Cardiology following      Patient with chest pain worsened while on HD  Trops are flat 0.14  EKG with no ischemic changes  Echo with worsening wall motion as per cardio  Seen by cardiology, plan was for Left heart cath 06/09 but patient had a cardiac arrest that morning; managing medically for now  - continue ASA/plavix/statin > if patient tolerate heparin drip, we will transition to a DOAC then can stop aspirin, continue Plavix  - Cardiology following        Patient with chest pain worsened while on HD  Trops are flat 0.14  EKG with no ischemic changes  Echo with worsening wall motion as per cardio  Seen by cardiology, plan was for  Left heart cath 06/09 but patient had a cardiac arrest that morning; managing medically for now  - continue ASA/plavix/statin  - Cardiology following      Patient with chest pain worsened while on HD  Trops are flat 0.14  EKG with no ischemic changes  Echo with worsening wall motion as per cardio  Seen by cardiology, plan was for Left heart cath 06/09 but patient had a cardiac arrest that morning; managing medically for now  - continue ASA/plavix/statin  - Cardiology following        Patient with chest pain worsened while on HD  Trops are flat 0.14  EKG with no ischemic changes  Echo with worsening wall motion as per cardio  Seen by cardiology, plan was for Left heart cath 06/09 but patient had a cardiac arrest that morning; managing medically for now  - continue ASA/plavix/statin  - Cardiology following

## 2025-06-16 NOTE — SUBJECTIVE & OBJECTIVE
Review of Systems   Unable to perform ROS: Other     Objective:     Vital Signs (Most Recent):  Temp: 98.3 °F (36.8 °C) (06/16/25 0715)  Pulse: 81 (06/16/25 0800)  Resp: 15 (06/16/25 0800)  BP: (!) 115/54 (06/16/25 0905)  SpO2: 100 % (06/16/25 0800) Vital Signs (24h Range):  Temp:  [97.6 °F (36.4 °C)-98.3 °F (36.8 °C)] 98.3 °F (36.8 °C)  Pulse:  [73-90] 81  Resp:  [10-28] 15  SpO2:  [95 %-100 %] 100 %  BP: ()/(53-78) 115/54     Weight: 81.6 kg (179 lb 14.3 oz)  Body mass index is 25.81 kg/m².     SpO2: 100 %         Intake/Output Summary (Last 24 hours) at 6/16/2025 1039  Last data filed at 6/16/2025 0200  Gross per 24 hour   Intake 216.64 ml   Output 100 ml   Net 116.64 ml       Lines/Drains/Airways       Peripheral Intravenous Line  Duration                  Hemodialysis AV Fistula Left upper arm -- days         Peripheral IV - Single Lumen 06/12/25 1816 20 G Anterior;Right Forearm 3 days         Peripheral IV - Single Lumen 06/13/25 1445 20 G Anterior;Right Wrist 2 days                       Physical Exam  Constitutional:       General: He is not in acute distress.     Appearance: He is ill-appearing.   Neck:      Vascular: No JVD.   Cardiovascular:      Rate and Rhythm: Normal rate and regular rhythm.      Heart sounds: No murmur heard.     No gallop.   Pulmonary:      Effort: Pulmonary effort is normal. No respiratory distress.      Breath sounds: Normal breath sounds. No wheezing.   Abdominal:      General: Bowel sounds are normal. There is no distension.      Palpations: Abdomen is soft.      Tenderness: There is no abdominal tenderness.   Musculoskeletal:      Cervical back: Normal range of motion and neck supple.   Skin:     General: Skin is warm and dry.   Neurological:      Mental Status: He is alert.      Comments: Disoriented    Psychiatric:         Behavior: Behavior normal.         Thought Content: Thought content normal.         Judgment: Judgment normal.                Significant Imaging:  Echocardiogram: Transthoracic echo (TTE) complete (Cupid Only):   Results for orders placed or performed during the hospital encounter of 06/07/25   Echo   Result Value Ref Range    BSA 2.01 m2    Child's Biplane MOD Ejection Fraction 33 %    A2C EF 25 %    A4C EF 37 %    LVIDd 4.4 3.5 - 6.0 cm    LV Systolic Volume 55 mL    LV Systolic Volume Index 27.5 mL/m2    LVIDs 3.6 2.1 - 4.0 cm    LV Diastolic Volume 89 mL    LV Diastolic Volume Index 44.50 mL/m2    LV EDV A2C [id number] mL    LV EDV A4C 109.71 mL    Left Ventricular End Systolic Volume by Teichholz Method 54.60 mL    Left Ventricular End Diastolic Volume by Teichholz Method 88.67 mL    IVS 0.9 0.6 - 1.1 cm    FS 18.2 (A) 28 - 44 %    Left Ventricle Relative Wall Thickness 0.50 cm    PW 1.1 0.6 - 1.1 cm    LV mass 147.8 g    LV Mass Index 73.9 g/m2    ZLVIDS 0.05     ZLVIDD -2.81     Narrative      Left Ventricle: The left ventricle is normal in size. Mildly increased   wall thickness. Global hypokinesis present with some regional variability.    Septal motion is consistent with bundle branch block. There is moderately   reduced systolic function with a visually estimated ejection fraction of   30 - 35%.    Right Ventricle: The right ventricle is normal in size Systolic   function is normal.

## 2025-06-16 NOTE — PROGRESS NOTES
Nephrology Consult   Note     Consult Requested By: Nima Ervin MD  Reason for Consult: ESRD     SUBJECTIVE:     ?    Review of Systems   Constitutional:  Negative for chills and fever.   Respiratory:  Negative for cough and shortness of breath.    Cardiovascular:  Negative for chest pain and leg swelling.   Gastrointestinal:  Negative for nausea.               OBJECTIVE:     Vital Signs (Most Recent)  Vitals:    06/16/25 0730 06/16/25 0740 06/16/25 0800 06/16/25 0905   BP:   (!) 112/58 (!) 115/54   Pulse: 74 84 81    Resp: 12 16 15    Temp:       TempSrc:       SpO2: 99% 100% 100%    Weight:       Height:                         Medications:   aspirin  81 mg Oral Daily    atorvastatin  40 mg Oral QHS    carvediloL  12.5 mg Oral BID    clopidogreL  75 mg Oral Daily    cyanocobalamin  1,000 mcg Subcutaneous Daily    folic acid  1 mg Oral Daily    levothyroxine  75 mcg Oral Before breakfast    LIDOcaine  1 patch Transdermal Q24H    losartan  25 mg Oral Daily    mupirocin   Nasal BID    perflutren protein-a microsphr  0.5 mL Intravenous Once    sevelamer carbonate  1,600 mg Oral TID WM             Physical Exam  Vitals and nursing note reviewed.   Constitutional:       General: He is not in acute distress.     Appearance: He is not diaphoretic.   HENT:      Head: Normocephalic and atraumatic.      Mouth/Throat:      Pharynx: No oropharyngeal exudate.   Eyes:      General: No scleral icterus.     Conjunctiva/sclera: Conjunctivae normal.      Pupils: Pupils are equal, round, and reactive to light.   Cardiovascular:      Rate and Rhythm: Normal rate and regular rhythm.      Heart sounds: Normal heart sounds. No murmur heard.  Pulmonary:      Effort: Pulmonary effort is normal. No respiratory distress.      Breath sounds: No rales.   Abdominal:      General: Bowel sounds are normal. There is no distension.      Palpations: Abdomen is soft.      Tenderness: There is no abdominal tenderness.   Musculoskeletal:          General: Normal range of motion.      Cervical back: Normal range of motion and neck supple.      Right lower leg: No edema.      Left lower leg: No edema.      Comments: PEE ANN    Skin:     General: Skin is warm and dry.      Findings: No erythema.   Neurological:      Mental Status: He is alert and oriented to person, place, and time.      Cranial Nerves: No cranial nerve deficit.      Comments:     Psychiatric:         Mood and Affect: Affect normal.         Cognition and Memory: Memory is impaired.         Judgment: Judgment normal.         Laboratory:  Recent Labs   Lab 06/15/25  0354 06/16/25  0353 06/16/25  0924   WBC 8.40 8.27 8.95   HGB 9.0* 8.3* 8.2*   HCT 28.0* 25.5* 25.0*   * 156 152   MONO 11.3  0.95 6.7  0.55 6.4  0.57     Recent Labs   Lab 06/14/25  0347 06/15/25  0354 06/16/25  0353    138 133*   K 4.0 3.9 3.4*    104 99   CO2 27 20* 19*   BUN 67* 48* 71*   CREATININE 5.0* 3.9* 5.5*   CALCIUM 8.5* 8.8 8.4*   PHOS 4.1 4.1 4.8*       Diagnostic Results:  X-Ray: Reviewed  US: Reviewed  Echo: Reviewed    Left Ventricle: The left ventricle is mildly dilated. There is eccentric hypertrophy. Regional wall motion abnormalities present. See diagram for wall motion findings. There is mildly reduced systolic function with a visually estimated ejection fraction of 45 - 50%. There is indeterminate diastolic function.Elevated left ventricular filling pressure.    Right Ventricle: Normal right ventricular cavity size. Wall thickness is normal. Systolic function is normal.    Left Atrium: Left atrium is mildly dilated.    Right Atrium: Right atrium is mildly dilated.    Mitral Valve: There is mild to moderate regurgitation.    Pulmonic Valve: There is moderate regurgitation.    Pulmonary Artery: There is pulmonary hypertension. The estimated pulmonary artery systolic pressure is 47 mmHg.    IVC/SVC: Intermediate venous pressure at 8 mmHg.  ASSESSMENT/PLAN:    Patient  sent from his regular  dialysis after completing his treatment develop chest pain  a cardiac arrest while on the floor.  ESRD on HD TTS with Dr Loly Payne  in [facility]      Status post cardiac arrest.  Doing excellent considering what he went through.    Did not dialyze on Tuesday were too unstable.   Tolerated dialysis well on Wednesday Thursday  and Saturday   Resume his regular Tuesday Thursday Saturday schedule, plan for dialysis Tuesday    Patient with a history of persistent hypokalemia.  But when his cardiac arrest has been his potassium was 3.9.  Both days on Wednesday and yesterday patient ran on 4K bath  Potassium today 4.4.  Magnesium is 2.1.  Had episode of AFib with RVR today.  Unlikely to be related to electrolyte disturbances    Hypokalemia   -- Replace K+ to 4.0       2. HTN    was hypotensive after cardiac arrest, all of the blood pressure medications on hold.  Levophed was weaned from yesterday    3. 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  Hold Epogen in the settings of the cardiac arrest  with underlying CAD  Recent Labs   Lab 06/15/25  0354 06/16/25  0353 06/16/25  0924   HGB 9.0* 8.3* 8.2*   HCT 28.0* 25.5* 25.0*   * 156 152       Iron   Lab Results   Component Value Date    IRON 63 06/09/2025    TIBC 232 (L) 06/09/2025    FERRITIN 2,942.0 (H) 06/11/2025       4. MBD - PTH at goal     Sevelamer with each meal- Resume  Ergocalciferol 15268 units weekly    Recent Labs   Lab 06/12/25  0753 06/12/25  1829 06/13/25  0420   MG 2.4 2.0 2.1       Lab Results   Component Value Date    .8 (H) 06/04/2025    CALCIUM 8.4 (L) 06/16/2025    CAION 1.31 07/14/2020    PHOS 4.8 (H) 06/16/2025     Lab Results   Component Value Date    [id number] 9 (L) 03/14/2024     Acidosis - correct with HD   Lab Results   Component Value Date    CO2 19 (L) 06/16/2025     Hemodialysis access-left upper arm loop AV graft.     Nutrition/Hypoalbuminemia    Recent Labs   Lab  06/15/25  0354 06/16/25  0353   ALBUMIN 2.4* 2.2*     Nepro with meals TID.       Thank you for the consult, will follow  With any question please call [phone]  Nena Arriola MD    [facility]    ESPERANZA Payne MD,   MD BRANDY Arredondo MD E. V. Harmon, NP    [address]  [place], [address]  [phone]

## 2025-06-16 NOTE — PT/OT/SLP PROGRESS
Speech Language Pathology Treatment    Patient Name:  Domingo Gross   MRN:  [id number]  Admitting Diagnosis: Cardiac arrest    Recommendations:                 General Recommendations:  Dysphagia therapy, Speech/language therapy, and Cognitive-linguistic therapy, diet follow up  Diet recommendations:  Regular Diet - IDDSI Level 7, Liquid Diet Level: Thin liquids - IDDSI Level 0 can bribe with coke cola   Aspiration Precautions: 1 bite/sip at a time, Alternating bites/sips, Alternate means of nutrition/hydration, Avoid talking while eating, Check for pocketing/oral residue, Eliminate distractions, Feed only when awake/alert, Frequent oral care, HOB to 90 degrees, Meds whole 1 at a time, Monitor for s/s of aspiration, Remain upright 30 minutes post meal, Small bites/sips, and Standard aspiration precautions   General Precautions: Standard, fall, respiratory, other (see comments) (dialysis, post extubation, mumbled speech)  Communication strategies:  Repeat questions/info to ensure pt understanding and allow time to answer    Assessment:     Domingo Gross is a 64 y.o. male admitted with cardiac arrest, was intubated who presents with impaired cognition, impaired attention, weak voice and generalized weakness.     Pt may start regular diet with thin liquids but must be awake and alert when consuming trials and have assistance at bedside to monitor for s/s of aspiration and check for oral residue.     Chest X-Ray: 6/12  Monitoring leads overlie the chest.  Since prior examination of 06/09/2025, 10:33 hours.  Endotracheal tube, right internal jugular approach central venous catheter, enteric tubes appear to been discontinued     Grossly unchanged cardiac contours.  Patchy opacities persist in both lungs, somewhat improved since prior.     Small bilateral pleural effusions.     No definite pneumothorax.     Remainder examination not substantially changed.     CT results: 6/9  Enlargement of the superior ophthalmic  "vein bilaterally, right greater than left, as discussed above, clinical and historical correlation is needed to determine need for additional follow-up.     There is no additional evidence for acute intracranial process, chronic change noted.     This report was flagged in Epic as abnormal.     Subjective     Pt was seen in ICU at the bedside for dysphagia management, diet follow up of thin liquids/ minced and moist diet, and ongoing assessment of cognition and speech. Upon entry pt was reclined at 30 degrees watching TV. Pt showed increased alertness and maintained eye contact and conversation with SLP compared to yesterday.     Patient goals:   Pt will participate in dysphagia tx to determine safest diet level.  Pt will participate in informal cognitive/speech/language tx.   Pt will answer basic orientation questions with min cues.  Pt will tolerate a regular diet and thin liquids (coke/water) with no audible s/s of aspiration.   Pt will utilize speech strategies to be understood by staff including: making eye contact, speak loudly and clearly, and over-articulate with mod cues.   Pt will finish chewing and swallowing before speaking.       Pain/Comfort:  Pain Rating 1: 8/10 (Pt stated chest hurts but having pillow on top of chest helps pain)    Respiratory Status: Room air    Objective:     Has the patient been evaluated by SLP for swallowing?   Yes         Swallowing:   Pt is awake but mumbles when asked questions could be due to being an edentulous. Pt consumed 4 cup sips of coke cola and finished alternate means of nutrition (strawberry protein shake). Pt consumed 3 bites of a honey james cracker with complete oral clearance. Pt required increased mastication (no dentures present and stated he doesn't wear them). SLP asked pt if he wanted peaches, applesauce, pudding, or tray of breakfast (minced and moist diet) and he said no. 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 Pt was able to hold cup and drink from it with min assist (SlP handing pt cup from table). No s/s of aspiration during thin liquids (coke cola), thickened liquids (protein shake), or solids (james cracker). Pt should be monitored and requires assistance during consumption of lunch to determine if pt is able to properly masticate and swallow with no s/s of aspiration or oral dysphagia (oral residue/pocketing). Need to frequently remind pt not to speak while eating/chewing.     Cognition/Communication:  Pt mumbles when speaking. SLP asked pt do you notice others having trouble understanding you and pt stated "sometimes but because I talk too fast." Pt showed increased eye contact and ability to answer questions with a little of a clearly voice. Pt needed occasional repetition of questions. Pt was able to state name, age, day of the week, month, president, occupation, where he works, who he lives with. Pt unable to state why he is in the hospital, says he is unsure and does not remember. When asked what year it was he said 2021 then SLP told him it's 2025 and he indicated understanding. Pt was also asked about location and wear of dentures and pt stated he does not wear them. Cont to recommend MRI imaging and neuro consult.       Goals:   Multidisciplinary Problems       SLP Goals          Problem: SLP    Goal Priority Disciplines Outcome   SLP Goal     SLP Progressing   Description: Updated Short Term Goals:  1. Ongoing swallow eval to determine safest diet level. - MET 6/14  2. Pt tolerate FULL liquids with no audible dysphagia signs.  MET 6/14  3. Pt will participate in informal cognitive/speech/lang eval- ongoing  4. Pt will consume minced/moist diet and thin liquids with no audible dysphagia signs  5. Pt will state basic orientation with min cues.   6. Pt will answer general questions with audible yes/no response  7. " Pt will attend to structured tasks with mod cues.   8. Pt will utilize speech strategies to be understood by staff including: making eye contact, speak loudly, clearly and over-articulate with mod cues.                        Plan:     Patient to be seen:  3 x/week   Plan of Care expires:  07/15/25  Plan of Care reviewed with:  patient   SLP Follow-Up:  Yes       Discharge recommendations:   (TBD)   Barriers to Discharge:  None    Time Tracking:     SLP Treatment Date:   06/16/25  Speech Start Time:  1025  Speech Stop Time:  1043     Speech Total Time (min):  18 min    Billable Minutes: Speech Therapy Individual 8 and Treatment Swallowing Dysfunction 10    06/16/2025

## 2025-06-16 NOTE — PLAN OF CARE
Problem: Adult Inpatient Plan of Care  Goal: Readiness for Transition of Care  Outcome: Progressing     Problem: Wound  Goal: Skin Health and Integrity  Outcome: Progressing     Problem: Comorbidity Management  Goal: Blood Pressure in Desired Range  Outcome: Progressing     Problem: Delirium  Goal: Improved Behavioral Control  Outcome: Progressing     Problem: Delirium  Goal: Improved Attention and Thought Clarity  Outcome: Progressing     Problem: Dysrhythmia  Goal: Normalized Cardiac Rhythm  Outcome: Not Progressing     Pt AAOX3. Afib with PVCs on monitor. Amio gtt @ 0.5mg/min. Bp soft, MAPs > 60. Afebrile. O2 sats stable on RA. Q6 accucheks performed, no coverage needed. Pt c/o having no appetite. No BM. POC reviewed. Report given to PM NICOLAS.

## 2025-06-16 NOTE — EICU
Virtual ICU Quality Rounds    Admit Date: 6/7/2025  Hospital Day: 9    ICU Day: 7d 3h    24H Vital Sign Range:  Temp:  [97.6 °F (36.4 °C)-98.3 °F (36.8 °C)]   Pulse:  [73-90]   Resp:  [10-28]   BP: ()/(53-78)   SpO2:  [95 %-100 %]     VICU Surveillance Screening                    LDA reconciliation : Yes

## 2025-06-16 NOTE — ASSESSMENT & PLAN NOTE
>>ASSESSMENT AND PLAN FOR ESRD (END STAGE RENAL DISEASE) WRITTEN ON 6/7/2025  5:21 PM BY ERICH FRAZIER MD    Creatine stable for now. BMP reviewed- noted Estimated Creatinine Clearance: 14.2 mL/min (A) (based on SCr of 5.5 mg/dL (H)). according to latest data. Based on current GFR, CKD stage is end stage.  Monitor UOP and serial BMP and adjust therapy as needed. Renally dose meds. Avoid nephrotoxic medications and procedures.    ESRD On HD,

## 2025-06-16 NOTE — ASSESSMENT & PLAN NOTE
- BP 90s-110s overnight  - on ARB, BB and Hydralazine over the weekend  - will stop Hydralazine today; continue ARB; transition Coreg to Toprol XL   - goal BP less than 130/80  - BP well controlled; monitor BP and adjust meds prn

## 2025-06-16 NOTE — ASSESSMENT & PLAN NOTE
- H&H 8.3&25.5 this AM trended down from 9.0&28.0 yesterday  - anemia felt to be related to AOCD; remains on DAPT with plans for IV Heparin today  - monitor H&H closely

## 2025-06-16 NOTE — ASSESSMENT & PLAN NOTE
- echo with EF 30-35%   - on BB and Hydralazine as an outpatient- initially held due to pressor use with gradual addition  - will stop Hydralazine; continue ARB; will transition Coreg to Toprol XL

## 2025-06-16 NOTE — PLAN OF CARE
"Chart reviewed  - CM met with pt - he is awake  and responds to questions.  Explained role of CM in assisting with post d/c needs.    Pt remains in ICU    Per therapy notes - rec "high" intensity post discharge   Referrals sent to [facility] per [facility].  [facility] is monitoring pt status.    CM spoke at length with pt's wife Friday - she is aware that pt may need additional therapy before discharging to home.   wife Karmen [phone].  CM advised her that CM will help with pt's post discharge needs .   Confirmed that pt gets HD on Tues and Sat - 10 am  at [facility].  Pt drives himself to dialysis.      06/16/25 1059   Post-Acute Status   Post-Acute Authorization Placement   Post-Acute Placement Status Referrals Sent   Discharge Plan   Discharge Plan A Rehab   Discharge Plan B Home;Home with family;Home Health       "

## 2025-06-16 NOTE — ASSESSMENT & PLAN NOTE
Patient had cardiac arrest requiring ACLS x3 on 6/9  Remarkable improvement  - extubated on 06/11.  Tolerating well  - Pulmonology and Cardiology following

## 2025-06-16 NOTE — PT/OT/SLP PROGRESS
Physical Therapy Treatment    Patient Name:  Domingo Gross   MRN:  [id number]    Recommendations:     Discharge Recommendations: High Intensity Therapy  Discharge Equipment Recommendations: to be determined by next level of care  Barriers to discharge: limited functional endurance/independence    Assessment:     Domingo Gross is a 64 y.o. male admitted with a medical diagnosis of Cardiac arrest.  He presents with the following impairments/functional limitations: weakness, impaired endurance, impaired self care skills, impaired functional mobility, gait instability, impaired balance, decreased lower extremity function, decreased safety awareness, pain, impaired cardiopulmonary response to activity.      PT/OT co-session to attempt safe progression of mobility; limited to bed mobility due to decline in BP and elevated HR. Therapy will continue to progress pt as able.       Rehab Prognosis: Good; patient would benefit from acute skilled PT services to address these deficits and reach maximum level of function.    Recent Surgery: Procedure(s) (LRB):  ANGIOGRAM, CORONARY ARTERY (N/A)      Plan:     During this hospitalization, patient to be seen 5 x/week to address the identified rehab impairments via gait training, therapeutic activities, therapeutic exercises, neuromuscular re-education and progress toward the following goals:    Plan of Care Expires:  07/14/25    Subjective     Chief Complaint: none  Patient/Family Comments/goals: to progress mobility  Pain/Comfort:  Pain Rating 1: 0/10  Pain Rating Post-Intervention 1: 0/10      Objective:     Communicated with Nurse prior to session.  Patient found HOB elevated with blood pressure cuff, peripheral IV, pulse ox (continuous), telemetry upon PT entry to room.     General Precautions: Standard, fall  Orthopedic Precautions: N/A  Braces: N/A  Respiratory Status: Room air     Functional Mobility:  Bed Mobility:     Scooting: stand by assistance  Supine to Sit:  minimum assistance and of 2 persons  Sit to Supine: minimum assistance and of 2 persons      AM-PAC 6 CLICK MOBILITY  Turning over in bed (including adjusting bedclothes, sheets and blankets)?: 3  Sitting down on and standing up from a chair with arms (e.g., wheelchair, bedside commode, etc.): 2  Moving from lying on back to sitting on the side of the bed?: 3  Moving to and from a bed to a chair (including a wheelchair)?: 2  Need to walk in hospital room?: 2  Climbing 3-5 steps with a railing?: 1  Basic Mobility Total Score: 13       Treatment & Education:  Pt oriented to person, place, and current month; requires increased time/cues to recall current year.   Pt's BP in supine with HOB elevated upon entry 97/50 (when checked again 87/53), in sitting 72/48- no reported dizziness, with return to supine 83/53.   Pt's HR varies between 111-126bpm with bed mobility and elevated to highest of 130bpm with sitting EOB.   Pt safely returned back to supine in bed; nursing present during session.   Pt educated on use of call button; pt understanding.     Patient left HOB elevated with all lines intact, call button in reach, Nurse notified, and Nurse present.    GOALS:   Multidisciplinary Problems       Physical Therapy Goals          Problem: Physical Therapy    Goal Priority Disciplines Outcome Interventions   Physical Therapy Goal     PT, PT/OT Progressing    Description: Goals to be met by: 2025    Patient will increase functional independence with mobility by performin. Sit<>stand with CGA with RW.  2. Gait x 50 feet with RW with SBA.  3. Supine<>sit with CGA.                           DME Justifications:  TBD    Time Tracking:     PT Received On: 25  PT Start Time: 1438     PT Stop Time: 1457  PT Total Time (min): 19 min With OT    Billable Minutes: Therapeutic Activity 12    Treatment Type: Treatment  PT/PTA: PT     Number of PTA visits since last PT visit: 0     2025

## 2025-06-16 NOTE — PROGRESS NOTES
[facility] - Intensive Care  Cardiology  Progress Note    Patient Name: Domingo Gross  MRN: [id number]  Admission Date: 6/7/2025  Hospital Length of Stay: 9 days  Code Status: Full Code   Attending Physician: Nima Ervin MD   Primary Care Physician: Geeta Coffey MD  Expected Discharge Date:   Principal Problem:Cardiac arrest    Subjective:     Hospital Course:     Per Dr. Salazar consult note 6/7/2025 64 y.o. male with PMH ESRD on HD, PAD with multiple interventions, CAD (mid LAD/prox RCA PCI 3/2019 and prox LCA in 10/2019 following out of hospital cardiac arrest), after cardiac arrest in setting of prox LCX occlusion treated with PCI, 9/16/2024 s/p intervention w cutting/shockwave PTCA to Lcx, 01/24/25 PCI of OM, new reduction in EF presents to ER with substernal chest pain that radiates to his throat. Reports his chest pain started last night which did not improved with nitro. Trops with significant delta from baseline. Concerning symptoms given extensive cardiovascular history.   CAD with multiple interventions/ NSTEMI: start heparin gtt, hold eliquis, continue DAPT, statin, bb  HFrEF - continue BB, will continue to optimize GDMT   Afib per EP- holding eliquis, continue heparin   ESRD on HD- please consult nephro   -NPO Sunday MN FOR CATH+/-PCI Monday. If refractory chest pain despite meds contact me - low threshold for cath.   -Trend troponin    6/9/2025 Cardiac arrest with PEA with ACLS initiated and VTach as well. LHC cancelled   Per  Cardiac arrest- PEA ARREST multiples ACLS round. qTC >600ms .   Multiple cardiac arrest in patient with extensive CAD history  ESRD  NSTEMI   Plan:   -DAPT  -Levophed  prn to keep MAP > 65  -Continue DAPT (recent PCI of Lcx)  -HOLDING PO MEDS cautious with IV amiodarone meds   -Monitor Qtc and electrolytes  -Extubate on BiPAP per ICU team.   -Please contact me for any cardiac question     6/13/2025 HD yesterday with 1L removed. H&H down to 7.4&22.5 this AM Recurrent  afib with RVR this AM. BP stable overnight     Per Dr Moran progress note 6/15/2025  1. Cardiac arrest/ PEA arrest, and reported VT during the code  2. History of coronary artery disease status post multiple PCI  3. Heart failure reduced EF/ischemic cardiomyopathy  4. ESRD on dialysis  5. Paroxysmal Afib  6. Anemia post PRBCs transfusion        Plan  Good recovery post cardiac arrest from cardiac standpoint.  Still with confusion  We will recommend medical therapy from cardiac standpoint at this time  We will continue IV amiodarone for time being.  Likely we will switch to p.o. with the next 48 hours     Monitor QT closely  Continue aspirin and Plavix.  Anticoagulation on hold given his anemia which is stable.   I will recommend starting systemic anticoagulation with heparin and monitor H&H closely and switch to DOAC if H&H stable  Once started on full-dose anticoagulation we can stop aspirin and continue Plavix  Continue Coreg 12.5 mg b.i.d.  Continue hydralazine  Continue losartan  6/15/2025 Recurrent afib with RVR over the weekend requiring resumption of IV Amiodarone. Converted to NSR and remains in NSR. SBP 90s-110s overnight CBC with H&H 8.3&25.5 down from 9.0&28.0 BMP with Na 133 K+ 3.4- replacement ordered. Initial EKG this AM at 0700 with NSR with repeat EKG at 0900 with ?afib; -547                Review of Systems   Unable to perform ROS: Other     Objective:     Vital Signs (Most Recent):  Temp: 98.3 °F (36.8 °C) (06/16/25 0715)  Pulse: 81 (06/16/25 0800)  Resp: 15 (06/16/25 0800)  BP: (!) 115/54 (06/16/25 0905)  SpO2: 100 % (06/16/25 0800) Vital Signs (24h Range):  Temp:  [97.6 °F (36.4 °C)-98.3 °F (36.8 °C)] 98.3 °F (36.8 °C)  Pulse:  [73-90] 81  Resp:  [10-28] 15  SpO2:  [95 %-100 %] 100 %  BP: ()/(53-78) 115/54     Weight: 81.6 kg (179 lb 14.3 oz)  Body mass index is 25.81 kg/m².     SpO2: 100 %         Intake/Output Summary (Last 24 hours) at 6/16/2025 1039  Last data filed at 6/16/2025  0200  Gross per 24 hour   Intake 216.64 ml   Output 100 ml   Net 116.64 ml       Lines/Drains/Airways       Peripheral Intravenous Line  Duration                  Hemodialysis AV Fistula Left upper arm -- days         Peripheral IV - Single Lumen 06/12/25 1816 20 G Anterior;Right Forearm 3 days         Peripheral IV - Single Lumen 06/13/25 1445 20 G Anterior;Right Wrist 2 days                       Physical Exam  Constitutional:       General: He is not in acute distress.     Appearance: He is ill-appearing.   Neck:      Vascular: No JVD.   Cardiovascular:      Rate and Rhythm: Normal rate and regular rhythm.      Heart sounds: No murmur heard.     No gallop.   Pulmonary:      Effort: Pulmonary effort is normal. No respiratory distress.      Breath sounds: Normal breath sounds. No wheezing.   Abdominal:      General: Bowel sounds are normal. There is no distension.      Palpations: Abdomen is soft.      Tenderness: There is no abdominal tenderness.   Musculoskeletal:      Cervical back: Normal range of motion and neck supple.   Skin:     General: Skin is warm and dry.   Neurological:      Mental Status: He is alert.      Comments: Disoriented    Psychiatric:         Behavior: Behavior normal.         Thought Content: Thought content normal.         Judgment: Judgment normal.                Significant Imaging: Echocardiogram: Transthoracic echo (TTE) complete (Cupid Only):   Results for orders placed or performed during the hospital encounter of 06/07/25   Echo   Result Value Ref Range    BSA 2.01 m2    Child's Biplane MOD Ejection Fraction 33 %    A2C EF 25 %    A4C EF 37 %    LVIDd 4.4 3.5 - 6.0 cm    LV Systolic Volume 55 mL    LV Systolic Volume Index 27.5 mL/m2    LVIDs 3.6 2.1 - 4.0 cm    LV Diastolic Volume 89 mL    LV Diastolic Volume Index 44.50 mL/m2    LV EDV A2C 100.338109902800493 mL    LV EDV A4C 109.71 mL    Left Ventricular End Systolic Volume by Teichholz Method 54.60 mL    Left Ventricular End  Diastolic Volume by Teichholz Method 88.67 mL    IVS 0.9 0.6 - 1.1 cm    FS 18.2 (A) 28 - 44 %    Left Ventricle Relative Wall Thickness 0.50 cm    PW 1.1 0.6 - 1.1 cm    LV mass 147.8 g    LV Mass Index 73.9 g/m2    ZLVIDS 0.05     ZLVIDD -2.81     Narrative      Left Ventricle: The left ventricle is normal in size. Mildly increased   wall thickness. Global hypokinesis present with some regional variability.    Septal motion is consistent with bundle branch block. There is moderately   reduced systolic function with a visually estimated ejection fraction of   30 - 35%.    Right Ventricle: The right ventricle is normal in size Systolic   function is normal.       Assessment and Plan:         * Cardiac arrest  - PEA arrest last week with ACLS with ROSC; reports of VTach; loaded with IV Amiodarone and placed on IV Amiodarone drip- discontinuation due to prolonged QTC  - QTC improved; no recurrent ventricular tachyarrhythmias; intermittent recurrent afib with RVR requiring resumption of IV Amiodarone   - K+ replaced this AM  - monitor closely; maintain K+ >4.0 Mg >2.0    Prolonged Q-T interval on ECG  - EKG last week with  treated with Amiodarone due to ?VT; QT improved most recent EKG this AM with -547  - will monitor QTC with Amiodarone use     NSTEMI (non-ST elevated myocardial infarction)  - presented with chest pain; initial troponin 0.1-0.3 with trend up to 1.9 after cardiac arrest  - history of CAD; originally planned for [facility] but deferred given cardiac arrest; GDMT as detailed previously   - no plans for [facility] as of now     HFrEF (heart failure with reduced ejection fraction)  - echo with EF 30-35%   - on BB and Hydralazine as an outpatient- initially held due to pressor use with gradual addition  - will stop Hydralazine; continue ARB; will transition Coreg to Toprol XL     Atherosclerosis of native coronary artery of native heart with unstable angina pectoris  - extensive PCI history with LAD and  RCA PCI 3/19529, LCX 2019, LAD REZA 2023, LCX PCI 5/2023, LCX PTCA lithotripsy 9/2024 LCX PTCA 1/2025  -  presented with chest pain with concern for USA/NSTEMI C deferred due to cardiac arrest  - plan for medical management for now with DAPT, statin ARB and BB; will plan to continue medical management for now; no plans for invasive strategy; patient very debilitated and recovering from acute event; once Eliquis started will stop ASA but continue Plavix     Paroxysmal atrial fibrillation  - history of PAF with EKG in 2023 with PAF; EKGs this admission with NSR; intermittent recurrent episodes of afib with RVR throughout the weekend  - back on IV Amiodarone after load; initial EKG this AM with NSR: repeat EKG ?atrial fibrillation; rate controlled   - will continue IV Amidoarone throughout today and plan to possibly transition to oral tomorrow as long as RVR controlled   - on Coreg 12.5mg po BID and will transition to Toprol XL given marginal BP; considered IV Heparin drip but held off initially due to anemia; on high dose SQ Heparin today not opposed to transition to IV drip since unable to use Lovenox given ESRD; once Eliquis started will stop ASA    - monitor on telemetry     Anemia due to chronic kidney disease, on chronic dialysis  - H&H 8.3&25.5 this AM trended down from 9.0&28.0 yesterday  - anemia felt to be related to AOCD; remains on DAPT with plans for IV Heparin today  - monitor H&H closely     Primary hypertension  - BP 90s-110s overnight  - on ARB, BB and Hydralazine over the weekend  - will stop Hydralazine today; continue ARB; transition Coreg to Toprol XL   - goal BP less than 130/80  - BP well controlled; monitor BP and adjust meds prn     Hypokalemia      - K+ 3.4 this AM- replaced with KDur        40mEq       - goal K+>4.0 Mg >2.0; will repeat BMP and Mg in AM     VTE Risk Mitigation (From admission, onward)           Ordered     heparin 25,000 units in dextrose 5% (100 units/ml) IV bolus from bag  LOW INTENSITY nomogram - [facility]  As needed (PRN)        Question:  Heparin Infusion Adjustment (DO NOT MODIFY ANSWER)  Answer:  [email or web address]\Images\Pharmacy\HeparinInfusions\heparin LOW INTENSITY nomogram for [facility] [id number]    06/16/25 0908     heparin 25,000 units in dextrose 5% (100 units/ml) IV bolus from bag LOW INTENSITY nomogram - [facility]  As needed (PRN)        Question:  Heparin Infusion Adjustment (DO NOT MODIFY ANSWER)  Answer:  [email or web address]\Images\Pharmacy\HeparinInfusions\heparin LOW INTENSITY nomogram for [facility] [id number]    06/16/25 0908     heparin 25,000 units in dextrose 5% 250 mL (100 units/mL) infusion LOW INTENSITY nomogram - [facility]  Continuous        Question:  Begin at (units/kg/hr)  Answer:  12    06/16/25 0908     IP VTE HIGH RISK PATIENT  Once         06/07/25 1155     Place sequential compression device  Until discontinued         06/07/25 1155                  > 40 minutes was spent in the care of this patient for evaluation, critical thinking and decision making. Also discussion with staff MD on POC. Patient seen by lethargic and appears to minimally understand. Not all time was spent in direct face to face with patient as time was spent reviewing ECGs, CXR, labs, medications and past studies.     Marce Lofton APRN, ANP  Cardiology  [facility] - Intensive Care

## 2025-06-16 NOTE — ASSESSMENT & PLAN NOTE
- PEA arrest last week with ACLS with ROSC; reports of VTach; loaded with IV Amiodarone and placed on IV Amiodarone drip- discontinuation due to prolonged QTC  - QTC improved; no recurrent ventricular tachyarrhythmias; intermittent recurrent afib with RVR requiring resumption of IV Amiodarone   - K+ replaced this AM  - monitor closely; maintain K+ >4.0 Mg >2.0

## 2025-06-16 NOTE — ASSESSMENT & PLAN NOTE
- EKG last week with  treated with Amiodarone due to ?VT; QT improved most recent EKG this AM with -547  - will monitor QTC with Amiodarone use

## 2025-06-16 NOTE — ASSESSMENT & PLAN NOTE
- presented with chest pain; initial troponin 0.1-0.3 with trend up to 1.9 after cardiac arrest  - history of CAD; originally planned for [facility] but deferred given cardiac arrest; GDMT as detailed previously   - no plans for LHC as of now

## 2025-06-17 PROBLEM — R57.0 CARDIOGENIC SHOCK: Status: RESOLVED | Noted: 2025-01-01 | Resolved: 2025-01-01

## 2025-06-17 NOTE — ASSESSMENT & PLAN NOTE
- H&H 7.7&24.2 this AM down from 8.3&25.5 yesterday and 9.0&28.0 previous days  - anemia felt to be related to AOCD; remains on DAPT;' resumed IV Heparin yesterday but will d/c  today  - monitor H&H closely; may need repeat PRBC transfusion

## 2025-06-17 NOTE — ASSESSMENT & PLAN NOTE
Anemia is likely due to chronic disease due to ESRD. Most recent hemoglobin and hematocrit are listed below.  Recent Labs     06/16/25  0353 06/16/25  0924 06/17/25  0401   HGB 8.3* 8.2* 7.7*   HCT 25.5* 25.0* 24.2*     Plan  - Monitor serial CBC: Daily  - Transfuse PRBC if patient becomes hemodynamically unstable, symptomatic or H/H drops below 7/21.  - Patient has not received any PRBC transfusions to date.  - Patient's anemia is currently stable  -will recheck CBC to see if this is an actual drop

## 2025-06-17 NOTE — PROGRESS NOTES
06/17/25 1350   Vital Signs   Pulse 77   Resp 15   SpO2 100 %   /65   MAP (mmHg) 83   Assessments (Pre/Post)   Blood Liters Processed (BLP) 61.3   Level of Consciousness (AVPU) alert   Dialyzer Clearance mildly streaked        Hemodialysis AV Fistula Left upper arm   No placement date or time found.   Present Prior to Hospital Arrival?: Yes  Location: Left upper arm   Patency Present;Thrill;Bruit   Status Deaccessed   Flows Good   Dressing Intervention Integrity maintained   Dressing Status Clean;Dry;Intact   Site Condition No complications   Dressing Gauze   Post-Hemodialysis Assessment   Rinseback Volume (mL) 250 mL   Blood Volume Processed (Liters) 61.3 L   Dialyzer Clearance Lightly streaked   Duration of Treatment 210 minutes   Total UF (mL) 1500 mL   Net Fluid Removal 1000   Patient Response to Treatment Bradley well   Post-Hemodialysis Comments HD complete. Blood returned. Needles pulled. Pressure held per P/P. Gauze applied. VSS.

## 2025-06-17 NOTE — ASSESSMENT & PLAN NOTE
- history of PAF with EKG in 2023 with PAF; EKGs this admission with NSR; intermittent recurrent episodes of afib with RVR throughout the weekend  - IV Amiodarone resumed and maintained on drip for approximately 36 hours; converted to NSR overnight; will transition back to oral Amiodarone 400mg po BID x 10 days then 200mg daily thereafter   - continue Toprol  BID; IV Heparin drip resumed yesterday but will hold today given down trend of H&H  - CHADSVAS score 3 (CHF, HTN, PAD) with indication for chronic AC for stroke prevention however currently bleeding risk high    - monitor on telemetry

## 2025-06-17 NOTE — SUBJECTIVE & OBJECTIVE
Interval History:     I have seen and examined the patient today  Patient is awake and alert     Overnight patient had afib with RVR  Now resolved    This morning H&H drop to 7.7, heparin drip was held.    Review of Systems   Unable to perform ROS: Mental status change     Objective:     Vital Signs (Most Recent):  Temp: 97.8 °F (36.6 °C) (06/17/25 1130)  Pulse: 77 (06/17/25 1350)  Resp: 15 (06/17/25 1350)  BP: 112/65 (06/17/25 1350)  SpO2: 100 % (06/17/25 1350) Vital Signs (24h Range):  Temp:  [97.5 °F (36.4 °C)-99.2 °F (37.3 °C)] 97.8 °F (36.6 °C)  Pulse:  [] 77  Resp:  [9-22] 15  SpO2:  [96 %-100 %] 100 %  BP: ()/(49-67) 112/65     Weight: 81.6 kg (179 lb 14.3 oz)  Body mass index is 25.81 kg/m².    Intake/Output Summary (Last 24 hours) at 6/17/2025 1603  Last data filed at 6/17/2025 1350  Gross per 24 hour   Intake 801.35 ml   Output 1500 ml   Net -698.65 ml         Physical Exam  Constitutional:       Appearance: He is ill-appearing.   HENT:      Head: Normocephalic and atraumatic.   Cardiovascular:      Rate and Rhythm: Normal rate. Rhythm irregular.   Pulmonary:      Effort: Pulmonary effort is normal.      Breath sounds: Normal breath sounds.   Skin:     General: Skin is warm.   Neurological:      Mental Status: He is alert. He is disoriented.               Significant Labs: All pertinent labs within the past 24 hours have been reviewed.    Significant Imaging: I have reviewed all pertinent imaging results/findings within the past 24 hours.

## 2025-06-17 NOTE — ASSESSMENT & PLAN NOTE
Patient with chest pain worsened while on HD  Trops are flat 0.14  EKG with no ischemic changes  Echo with worsening wall motion as per cardio  Seen by cardiology, plan was for Left heart cath 06/09 but patient had a cardiac arrest that morning; managing medically for now  - continue ASA/plavix/statin  - Cardiology following        Patient with chest pain worsened while on HD  Trops are flat 0.14  EKG with no ischemic changes  Echo with worsening wall motion as per cardio  Seen by cardiology, plan was for Left heart cath 06/09 but patient had a cardiac arrest that morning; managing medically for now  - continue ASA/plavix/statin  - Cardiology following      Patient with chest pain worsened while on HD  Trops are flat 0.14  EKG with no ischemic changes  Echo with worsening wall motion as per cardio  Seen by cardiology, plan was for Left heart cath 06/09 but patient had a cardiac arrest that morning; managing medically for now  - continue ASA/plavix/statin  - Cardiology following        Patient with chest pain worsened while on HD  Trops are flat 0.14  EKG with no ischemic changes  Echo with worsening wall motion as per cardio  Seen by cardiology, plan was for Left heart cath 06/09 but patient had a cardiac arrest that morning; managing medically for now  - continue ASA/plavix/statin  - Cardiology following      Patient with chest pain worsened while on HD  Trops are flat 0.14  EKG with no ischemic changes  Echo with worsening wall motion as per cardio  Seen by cardiology, plan was for Left heart cath 06/09 but patient had a cardiac arrest that morning; managing medically for now  - continue ASA/plavix/statin > if patient tolerate heparin drip, we will transition to a DOAC then can stop aspirin, continue Plavix  - Cardiology following        Patient with chest pain worsened while on HD  Trops are flat 0.14  EKG with no ischemic changes  Echo with worsening wall motion as per cardio  Seen by cardiology, plan was for  Left heart cath 06/09 but patient had a cardiac arrest that morning; managing medically for now  - continue ASA/plavix/statin  - Cardiology following      Patient with chest pain worsened while on HD  Trops are flat 0.14  EKG with no ischemic changes  Echo with worsening wall motion as per cardio  Seen by cardiology, plan was for Left heart cath 06/09 but patient had a cardiac arrest that morning; managing medically for now  - continue ASA/plavix/statin  - Cardiology following        Patient with chest pain worsened while on HD  Trops are flat 0.14  EKG with no ischemic changes  Echo with worsening wall motion as per cardio  Seen by cardiology, plan was for Left heart cath 06/09 but patient had a cardiac arrest that morning; managing medically for now  - continue ASA/plavix/statin  - Cardiology following      Patient with chest pain worsened while on HD  Trops are flat 0.14  EKG with no ischemic changes  Echo with worsening wall motion as per cardio  Seen by cardiology, plan was for Left heart cath 06/09 but patient had a cardiac arrest that morning; managing medically for now  - continue ASA/plavix/statin  - Cardiology following        Patient with chest pain worsened while on HD  Trops are flat 0.14  EKG with no ischemic changes  Echo with worsening wall motion as per cardio  Seen by cardiology, plan was for Left heart cath 06/09 but patient had a cardiac arrest that morning; managing medically for now  - continue ASA/plavix/statin  - Cardiology following      Patient with chest pain worsened while on HD  Trops are flat 0.14  EKG with no ischemic changes  Echo with worsening wall motion as per cardio  Seen by cardiology, plan was for Left heart cath 06/09 but patient had a cardiac arrest that morning; managing medically for now  - continue ASA/plavix/statin  - Cardiology following        Patient with chest pain worsened while on HD  Trops are flat 0.14  EKG with no ischemic changes  Echo with worsening wall motion  as per cardio  Seen by cardiology, plan was for Left heart cath 06/09 but patient had a cardiac arrest that morning; managing medically for now  - continue ASA/plavix/statin  - Cardiology following      Patient with chest pain worsened while on HD  Trops are flat 0.14  EKG with no ischemic changes  Echo with worsening wall motion as per cardio  Seen by cardiology, plan was for Left heart cath 06/09 but patient had a cardiac arrest that morning; managing medically for now  - continue ASA/plavix/statin > if patient tolerate heparin drip, we will transition to a DOAC then can stop aspirin, continue Plavix  - Cardiology following        Patient with chest pain worsened while on HD  Trops are flat 0.14  EKG with no ischemic changes  Echo with worsening wall motion as per cardio  Seen by cardiology, plan was for Left heart cath 06/09 but patient had a cardiac arrest that morning; managing medically for now  - continue ASA/plavix/statin  - Cardiology following      Patient with chest pain worsened while on HD  Trops are flat 0.14  EKG with no ischemic changes  Echo with worsening wall motion as per cardio  Seen by cardiology, plan was for Left heart cath 06/09 but patient had a cardiac arrest that morning; managing medically for now  - continue ASA/plavix/statin  - Cardiology following        Patient with chest pain worsened while on HD  Trops are flat 0.14  EKG with no ischemic changes  Echo with worsening wall motion as per cardio  Seen by cardiology, plan was for Left heart cath 06/09 but patient had a cardiac arrest that morning; managing medically for now  - continue ASA/plavix/statin  - Cardiology following

## 2025-06-17 NOTE — PROGRESS NOTES
[facility] - Intensive Care  Adult Nutrition  Progress Note    SUMMARY       Recommendations    Recommendation:  1. Encourage intake of meals & supplements as tolerated.   2. Monitor weight/labs.   3. RD to continue to follow to monitor po intake    Goals:  Pt will tolerate diet with at least 50-75% intake at meals by RD follow up  Nutrition Goal Status: new  Communication of RD Recs: reviewed with RN (Randy)    Nutrition Discharge Planning  Nutrition Discharge Planning: Therapeutic diet (comments)  Therapeutic diet (comments): Renal    Assessment and Plan    Malnutrition Assessment  Orbital Region (Subcutaneous Fat Loss): well nourished  Upper Arm Region (Subcutaneous Fat Loss): well nourished  Thoracic and Lumbar Region: well nourished   [place] Region (Muscle Loss): well nourished  Clavicle Bone Region (Muscle Loss): well nourished  Clavicle and Acromion Bone Region (Muscle Loss): well nourished  Scapular Bone Region (Muscle Loss): well nourished  Dorsal Hand (Muscle Loss): well nourished  Patellar Region (Muscle Loss): well nourished  Anterior Thigh Region (Muscle Loss): well nourished  Posterior Calf Region (Muscle Loss): well nourished       Reason for Assessment  Reason For Assessment: RD follow-up  Diagnosis:  (cardiac arrest, NSTEMI)  General Information Comments: Pt admitted with chest pain. s/p codeblue with intubation 6/9. Extubated 6/11. Pt now on Regular Renal HD diet with Novasource Renal. RN reports pt with poor intake at meals. Drinking some Novasource. Receives HD. Brian 14-skin intact. No recent weight loss noted. NFPE completed today 6/17-nourished.    Past Medical History:   Diagnosis Date    Acute congestive heart failure 09/13/2024    Allergy     Anemia     Anticoagulant long-term use     Arthritis     CKD (chronic kidney disease) stage 4, GFR 15-29 ml/min     Closed fracture of transverse process of lumbar vertebra 02/16/2024    COPD (chronic obstructive pulmonary disease) 08/20/2021     "Coronary artery disease     Heart attack 10/04/2019    Hematuria     Hemothorax     Hyperlipidemia     Hypertension     Hyperuricemia     Hypocalcemia     Hypokalemia     Hypophosphatemia     Hypothyroidism 2023    PAD (peripheral artery disease)     Proteinuria     Vitamin D deficiency         Nutrition/Diet History  Food Preferences: no Oriental orthodox or cultural food prefs identified  Spiritual, Cultural Beliefs, Mormon Practices, Values that Affect Care: no  Food Allergies: NKFA  Factors Affecting Nutritional Intake: decreased appetite (s/p extubation)    Anthropometrics  Height: 5' 10" (177.8 cm)  Height (inches): 70 in  Height Method: Stated  Weight: 81.6 kg (179 lb 14.3 oz)  Weight (lb): 179.9 lb  Weight Method: Standard Scale  Ideal Body Weight (IBW), Male: 166 lb  % Ideal Body Weight, Male (lb): 108.37 %  BMI (Calculated): 25.8  BMI Grade: 25 - 29.9 - overweight  Usual Body Weight (UBW), k.2 kg (1216)  % Usual Body Weight: 100.7  % Weight Change From Usual Weight: 0.49 %     Lab/Procedures/Meds  Pertinent Labs Reviewed: reviewed  Pertinent Labs Comments: Na 132L, BUN 89H, Crea 7.4H, Ca 8.5L, Phos 5.6H, Alb 2.1L  Pertinent Medications Reviewed: reviewed  Pertinent Medications Comments: aspirin, folic acid, amiodarone, heparin    Estimated/Assessed Needs  Weight Used For Calorie Calculations: 81.6 kg (179 lb 14.3 oz)  Energy Calorie Requirements (kcal): 2284 (28 kcal/kg)  Energy Need Method: Kcal/kg  Protein Requirements: 81-97g (1.0-1.2g/kg)  Weight Used For Protein Calculations: 81.6 kg (179 lb 14.3 oz)  Fluid Requirements (mL): 1500+output or per md order  Estimated Fluid Requirement Method: RDA Method  RDA Method (mL): 2284  CHO Requirement: 250    Nutrition Prescription Ordered  Current Diet Order: Regular Renal HD  Current Nutrition Support Formula Ordered: [employer] Renal  Oral Nutrition Supplement: Novasource Renal    Evaluation of Received Nutrient/Fluid Intake  I/O: 801/0  Energy Calories " Required: not meeting needs  Protein Required: not meeting needs  Fluid Required: not meeting needs  Comments: LBM 6/13  % Intake of Estimated Energy Needs: 0 - 25 %  % Meal Intake: 0 - 25 %    PES Statement  Inadequate protein energy intake related to Trauma (s/p code blue with intubation) as evidenced by Intake <50% estimated needs (poor appetite s/p extubation)  Status: New    Nutrition Risk  Level of Risk/Frequency of Follow-up: moderate - high (Follow up: 2x per week)     Monitor and Evaluation  Monitor and Evaluation: Food and beverage intake, Weight, Electrolyte and renal panel, Gastrointestinal profile     Nutrition Related Social Determinants of Health: SDOH: Adequate food in home environment     Nutrition Follow-Up  RD Follow-up?: Yes

## 2025-06-17 NOTE — ASSESSMENT & PLAN NOTE
- EKG last week with  treated with Amiodarone due to ?VT; QT improved most recent EKG this AM with   - will monitor QTC with Amiodarone use

## 2025-06-17 NOTE — PROGRESS NOTES
[facility] - Intensive Care  Cardiology  Progress Note    Patient Name: Domingo Gross  MRN: [id number]  Admission Date: 6/7/2025  Hospital Length of Stay: 10 days  Code Status: Full Code   Attending Physician: Nima Ervin MD   Primary Care Physician: Geeta Coffey MD  Expected Discharge Date: 6/19/2025  Principal Problem:Cardiac arrest    Subjective:     Hospital Course:     Per Dr. Salazar consult note 6/7/2025 64 y.o. male with PMH ESRD on HD, PAD with multiple interventions, CAD (mid LAD/prox RCA PCI 3/2019 and prox LCA in 10/2019 following out of hospital cardiac arrest), after cardiac arrest in setting of prox LCX occlusion treated with PCI, 9/16/2024 s/p intervention w cutting/shockwave PTCA to Lcx, 01/24/25 PCI of OM, new reduction in EF presents to ER with substernal chest pain that radiates to his throat. Reports his chest pain started last night which did not improved with nitro. Trops with significant delta from baseline. Concerning symptoms given extensive cardiovascular history.   CAD with multiple interventions/ NSTEMI: start heparin gtt, hold eliquis, continue DAPT, statin, bb  HFrEF - continue BB, will continue to optimize GDMT   Afib per EP- holding eliquis, continue heparin   ESRD on HD- please consult nephro   -NPO Sunday MN FOR CATH+/-PCI Monday. If refractory chest pain despite meds contact me - low threshold for cath.   -Trend troponin    6/9/2025 Cardiac arrest with PEA with ACLS initiated and VTach as well. LHC cancelled   Per  Cardiac arrest- PEA ARREST multiples ACLS round. qTC >600ms .   Multiple cardiac arrest in patient with extensive CAD history  ESRD  NSTEMI   Plan:   -DAPT  -Levophed  prn to keep MAP > 65  -Continue DAPT (recent PCI of Lcx)  -HOLDING PO MEDS cautious with IV amiodarone meds   -Monitor Qtc and electrolytes  -Extubate on BiPAP per ICU team.   -Please contact me for any cardiac question     6/13/2025 HD yesterday with 1L removed. H&H down to 7.4&22.5 this AM  Recurrent afib with RVR this AM. BP stable overnight     Per Dr Moran progress note 6/15/2025  1. Cardiac arrest/ PEA arrest, and reported VT during the code  2. History of coronary artery disease status post multiple PCI  3. Heart failure reduced EF/ischemic cardiomyopathy  4. ESRD on dialysis  5. Paroxysmal Afib  6. Anemia post PRBCs transfusion        Plan  Good recovery post cardiac arrest from cardiac standpoint.  Still with confusion  We will recommend medical therapy from cardiac standpoint at this time  We will continue IV amiodarone for time being.  Likely we will switch to p.o. with the next 48 hours     Monitor QT closely  Continue aspirin and Plavix.  Anticoagulation on hold given his anemia which is stable.   I will recommend starting systemic anticoagulation with heparin and monitor H&H closely and switch to DOAC if H&H stable  Once started on full-dose anticoagulation we can stop aspirin and continue Plavix  Continue Coreg 12.5 mg b.i.d.  Continue hydralazine  Continue losartan  6/16/2025 Recurrent afib with RVR over the weekend requiring resumption of IV Amiodarone. Converted to NSR and remains in NSR. SBP 90s-110s overnight CBC with H&H 8.3&25.5 down from 9.0&28.0 BMP with Na 133 K+ 3.4- replacement ordered. Initial EKG this AM at 0700 with NSR with repeat EKG at 0900 with ?afib; -547  6/17/2025  Converted to NSR overnight- remains on IV Amiodarone. CBC with H&H down to 7.7&24.2 from 8&25 yesterday- IV Heparin drip resumed yesterday. SBP 90s-100s BMP with K+ 4.5              Review of Systems   Cardiovascular:  Negative for chest pain and dyspnea on exertion.     Objective:     Vital Signs (Most Recent):  Temp: 98.1 °F (36.7 °C) (06/17/25 0715)  Pulse: 76 (06/17/25 0923)  Resp: 13 (06/17/25 0900)  BP: (!) 117/59 (06/17/25 0923)  SpO2: 100 % (06/17/25 0900) Vital Signs (24h Range):  Temp:  [97.5 °F (36.4 °C)-99.2 °F (37.3 °C)] 98.1 °F (36.7 °C)  Pulse:  [] 76  Resp:  [9-28] 13  SpO2:   [96 %-100 %] 100 %  BP: ()/(49-70) 117/59     Weight: 81.6 kg (179 lb 14.3 oz)  Body mass index is 25.81 kg/m².     SpO2: 100 %         Intake/Output Summary (Last 24 hours) at 6/17/2025 1052  Last data filed at 6/17/2025 0600  Gross per 24 hour   Intake 801.35 ml   Output --   Net 801.35 ml       Lines/Drains/Airways       Peripheral Intravenous Line  Duration                  Hemodialysis AV Fistula Left upper arm -- days         Peripheral IV - Single Lumen 06/12/25 1816 20 G Anterior;Right Forearm 4 days         Peripheral IV - Single Lumen 06/13/25 1445 20 G Anterior;Right Wrist 3 days                       Physical Exam  Constitutional:       General: He is not in acute distress.     Appearance: He is well-developed.   Neck:      Vascular: No JVD.   Cardiovascular:      Rate and Rhythm: Normal rate and regular rhythm.      Heart sounds: No murmur heard.     No gallop.   Pulmonary:      Effort: Pulmonary effort is normal. No respiratory distress.      Breath sounds: Normal breath sounds. No wheezing.   Abdominal:      General: Bowel sounds are normal. There is no distension.      Palpations: Abdomen is soft.      Tenderness: There is no abdominal tenderness.   Musculoskeletal:      Cervical back: Normal range of motion and neck supple.   Skin:     General: Skin is warm and dry.   Neurological:      Mental Status: He is alert and oriented to person, place, and time.   Psychiatric:         Behavior: Behavior normal.         Thought Content: Thought content normal.         Judgment: Judgment normal.                Significant Imaging: Echocardiogram: Transthoracic echo (TTE) complete (Cupid Only):   Results for orders placed or performed during the hospital encounter of 06/07/25   Echo   Result Value Ref Range    BSA 2.01 m2    Child's Biplane MOD Ejection Fraction 33 %    A2C EF 25 %    A4C EF 37 %    LVIDd 4.4 3.5 - 6.0 cm    LV Systolic Volume 55 mL    LV Systolic Volume Index 27.5 mL/m2    LVIDs 3.6  2.1 - 4.0 cm    LV Diastolic Volume 89 mL    LV Diastolic Volume Index 44.50 mL/m2    LV EDV A2C 100.359804388732261 mL    LV EDV A4C 109.71 mL    Left Ventricular End Systolic Volume by Teichholz Method 54.60 mL    Left Ventricular End Diastolic Volume by Teichholz Method 88.67 mL    IVS 0.9 0.6 - 1.1 cm    FS 18.2 (A) 28 - 44 %    Left Ventricle Relative Wall Thickness 0.50 cm    PW 1.1 0.6 - 1.1 cm    LV mass 147.8 g    LV Mass Index 73.9 g/m2    ZLVIDS 0.05     ZLVIDD -2.81     Narrative      Left Ventricle: The left ventricle is normal in size. Mildly increased   wall thickness. Global hypokinesis present with some regional variability.    Septal motion is consistent with bundle branch block. There is moderately   reduced systolic function with a visually estimated ejection fraction of   30 - 35%.    Right Ventricle: The right ventricle is normal in size Systolic   function is normal.       Assessment and Plan:     Brief HPI: seen on AM rounds with Dr. Moran. More alert and oriented this AM. Denies any SOB. Briefly updated on POC     * Cardiac arrest  - PEA arrest last week with ACLS with ROSC; reports of VTach; loaded with IV Amiodarone and placed on IV Amiodarone drip- discontinuation due to prolonged QTC  - QTC improved; no recurrent ventricular tachyarrhythmias; intermittent recurrent afib with RVR requiring resumption of IV Amiodarone- back in NSR will transition to oral Amiodarone   - monitor closely; maintain K+ >4.0 Mg >2.0    Prolonged Q-T interval on ECG  - EKG last week with  treated with Amiodarone due to ?VT; QT improved most recent EKG this AM with   - will monitor QTC with Amiodarone use     NSTEMI (non-ST elevated myocardial infarction)  - presented with chest pain; initial troponin 0.1-0.3 with trend up to 1.9 after cardiac arrest  - history of CAD; originally planned for LHC but deferred given cardiac arrest; GDMT as detailed previously   - no plans for LHC as of now     HFrEF  (heart failure with reduced ejection fraction)  - echo with EF 30-35%   - on BB and Hydralazine as an outpatient- initially held due to pressor use with gradual addition  - continue ARB and Toprol XL     Atherosclerosis of native coronary artery of native heart with unstable angina pectoris  - extensive PCI history with LAD and RCA PCI 3/98448, LCX 2019, LAD REZA 2023, LCX PCI 5/2023, LCX PTCA lithotripsy 9/2024 LCX PTCA 1/2025  -  presented with chest pain with concern for USA/NSTEMI LHC deferred due to cardiac arrest  - plan for medical management for now with DAPT, statin ARB and BB; will plan to continue medical management for now; no plans for invasive strategy; patient very debilitated and recovering from acute event; if able to initiate Eliquis then  will stop ASA but continue Plavix     Paroxysmal atrial fibrillation  - history of PAF with EKG in 2023 with PAF; EKGs this admission with NSR; intermittent recurrent episodes of afib with RVR throughout the weekend  - IV Amiodarone resumed and maintained on drip for approximately 36 hours; converted to NSR overnight; will transition back to oral Amiodarone 400mg po BID x 10 days then 200mg daily thereafter   - continue Toprol  BID; IV Heparin drip resumed yesterday but will hold today given down trend of H&H  - CHADSVAS score 3 (CHF, HTN, PAD) with indication for chronic AC for stroke prevention however currently bleeding risk high    - monitor on telemetry     Anemia due to chronic kidney disease, on chronic dialysis  - H&H 7.7&24.2 this AM down from 8.3&25.5 yesterday and 9.0&28.0 previous days  - anemia felt to be related to AOCD; remains on DAPT;' resumed IV Heparin yesterday but will d/c  today  - monitor H&H closely; may need repeat PRBC transfusion     Primary hypertension  - BP 90s-100s overnight  - was on ARB, BB and Hydralazine with Hydralazine discontinued yesterday  - will remain off Hydralazine; Coreg converted to Toprol Xl and will continue  ARB  - goal BP less than 130/80  - BP well controlled; monitor BP and adjust meds prn         VTE Risk Mitigation (From admission, onward)           Ordered     IP VTE HIGH RISK PATIENT  Once         06/07/25 1155     Place sequential compression device  Until discontinued         06/07/25 1155                  > 40 minutes was spent in the care of this patient for evaluation, critical thinking and decision making. Also brief  discussion with patient as to present condition and POC. Not all time was spent in direct face to face with patient as time was spent reviewing ECGs, CXR, labs, medications and past studies.     Marce Lofton, APRN, ANP  Cardiology  [facility] - Intensive Care

## 2025-06-17 NOTE — ASSESSMENT & PLAN NOTE
Patient's blood pressure range in the last 24 hours was: BP  Min: 83/52  Max: 148/55.The patient's inpatient anti-hypertensive regimen is listed below:  Current Antihypertensives  losartan tablet 25 mg, Daily, Oral  metoprolol succinate (TOPROL-XL) 24 hr tablet 100 mg, 2 times daily, Oral    Plan  - BP is controlled, no changes needed to their regimen  - holding antihypertensives in setting of arrest/shock

## 2025-06-17 NOTE — PLAN OF CARE
Problem: Adult Inpatient Plan of Care  Goal: Plan of Care Review  Outcome: Progressing  Goal: Patient-Specific Goal (Individualized)  Outcome: Progressing  Goal: Absence of Hospital-Acquired Illness or Injury  Outcome: Progressing  Goal: Optimal Comfort and Wellbeing  Outcome: Progressing  Goal: Readiness for Transition of Care  Outcome: Progressing      Pt AAOX3. NSR on monitor throughout shift. Amio gtt @ 0.5mg/min. BP soft, MAPs > 60. Heparin gtt@ 10 units. No changed needed due to aPTT in therapeutic range.  Afebrile. O2 sats stable on RA. Q6 accucheks performed, no coverage needed. Pt c/o having no appetite. No BM. PRN oxycodone x1 and hydromorphone x1 administered due to c/o of rib pain. Patient safety maintained; Plan of care reviewed with patient. Report given to SKINNY VALENZUELA.

## 2025-06-17 NOTE — ASSESSMENT & PLAN NOTE
- PEA arrest last week with ACLS with ROSC; reports of VTach; loaded with IV Amiodarone and placed on IV Amiodarone drip- discontinuation due to prolonged QTC  - QTC improved; no recurrent ventricular tachyarrhythmias; intermittent recurrent afib with RVR requiring resumption of IV Amiodarone- back in NSR will transition to oral Amiodarone   - monitor closely; maintain K+ >4.0 Mg >2.0

## 2025-06-17 NOTE — ASSESSMENT & PLAN NOTE
- extensive PCI history with LAD and RCA PCI 3/77207, LCX 2019, LAD REZA 2023, LCX PCI 5/2023, LCX PTCA lithotripsy 9/2024 LCX PTCA 1/2025  -  presented with chest pain with concern for USA/NSTEMI LHC deferred due to cardiac arrest  - plan for medical management for now with DAPT, statin ARB and BB; will plan to continue medical management for now; no plans for invasive strategy; patient very debilitated and recovering from acute event; if able to initiate Eliquis then  will stop ASA but continue Plavix

## 2025-06-17 NOTE — PROCEDURES
"Patient seen and examined on dialysis. Tolerating HD well  Vitals:    06/17/25 0900 06/17/25 0923 06/17/25 1050 06/17/25 1130   BP:  (!) 117/59     BP Location:       Patient Position:       Pulse: 74 76     Resp: 13      Temp:    97.8 °F (36.6 °C)   TempSrc:    Oral   SpO2: 100%      Weight:   81.6 kg (179 lb 14.3 oz)    Height:   5' 10" (1.778 m)        With any question please call  [phone]  BRANDY Arriola MD    [facility]     ESPERANZA Payne MD,   MD BRANDY Arredondo MD E. V. Harmon, NP I.Goldvarg-Abud, NP    [address]  [place], [address]  "

## 2025-06-17 NOTE — ASSESSMENT & PLAN NOTE
- BP 90s-100s overnight  - was on ARB, BB and Hydralazine with Hydralazine discontinued yesterday  - will remain off Hydralazine; Coreg converted to Toprol Xl and will continue ARB  - goal BP less than 130/80  - BP well controlled; monitor BP and adjust meds prn

## 2025-06-17 NOTE — ASSESSMENT & PLAN NOTE
Patient has long standing persistent (>12 months) atrial fibrillation. Patient is currently in sinus rhythm. [id number] Score: 1. The patients heart rate in the last 24 hours is as follows:  Pulse  Min: 71  Max: 105     Antiarrhythmics  metoprolol succinate (TOPROL-XL) 24 hr tablet 100 mg, 2 times daily, Oral  amiodarone tablet 400 mg, 2 times daily, Oral  amiodarone tablet 200 mg, Daily, Oral    Anticoagulants       Plan  - Replete lytes with a goal of K>4, Mg >2  - Patient is anticoagulated, see medications listed above.  - Patient's afib is currently controlled

## 2025-06-17 NOTE — PROGRESS NOTES
[facility] Medicine  Progress Note    Patient Name: Domingo Gross  MRN: [id number]  Patient Class: IP- Inpatient   Admission Date: 6/7/2025  Length of Stay: 10 days  Attending Physician: Nima Ervin MD  Primary Care Provider: Geeta Coffey MD        Subjective     Principal Problem:Cardiac arrest        HPI:  64 y.o. male with PMH ESRD on HD, PAD with multiple interventions, CAD (mid LAD/prox RCA PCI 3/2019 and prox LCA in 10/2019 following out of hospital cardiac arrest), after cardiac arrest in setting of prox LCX occlusion treated with PCI, 9/16/2024 s/p intervention w cutting/shockwave PTCA to Lcx, 01/24/25 PCI of OM, new reduction in EF presents to ER with substernal chest pain that radiates to his throat. Report his chest pain feels like heaviness as if something is heavy sitting on his chest, started last night at rest, he took sublingual nitro which only slightly helped but he was still feeling it, today as he was having HD the pain got worse so he came to the ED.    Patient denies smoking or alcohol use        Overview/Hospital Course:  No notes on file    Interval History:     I have seen and examined the patient today  Patient is awake and alert     Overnight patient had afib with RVR  Now resolved    This morning H&H drop to 7.7, heparin drip was held.    Review of Systems   Unable to perform ROS: Mental status change     Objective:     Vital Signs (Most Recent):  Temp: 97.8 °F (36.6 °C) (06/17/25 1130)  Pulse: 77 (06/17/25 1350)  Resp: 15 (06/17/25 1350)  BP: 112/65 (06/17/25 1350)  SpO2: 100 % (06/17/25 1350) Vital Signs (24h Range):  Temp:  [97.5 °F (36.4 °C)-99.2 °F (37.3 °C)] 97.8 °F (36.6 °C)  Pulse:  [] 77  Resp:  [9-22] 15  SpO2:  [96 %-100 %] 100 %  BP: ()/(49-67) 112/65     Weight: 81.6 kg (179 lb 14.3 oz)  Body mass index is 25.81 kg/m².    Intake/Output Summary (Last 24 hours) at 6/17/2025 1603  Last data filed at 6/17/2025 1350  Gross per 24  hour   Intake 801.35 ml   Output 1500 ml   Net -698.65 ml         Physical Exam  Constitutional:       Appearance: He is ill-appearing.   HENT:      Head: Normocephalic and atraumatic.   Cardiovascular:      Rate and Rhythm: Normal rate. Rhythm irregular.   Pulmonary:      Effort: Pulmonary effort is normal.      Breath sounds: Normal breath sounds.   Skin:     General: Skin is warm.   Neurological:      Mental Status: He is alert. He is disoriented.               Significant Labs: All pertinent labs within the past 24 hours have been reviewed.    Significant Imaging: I have reviewed all pertinent imaging results/findings within the past 24 hours.      Assessment & Plan  Cardiac arrest  Patient had cardiac arrest requiring ACLS x3 on 6/9  Remarkable improvement  - extubated on 06/11.  Tolerating well  - Pulmonology and Cardiology following    Anoxic encephalopathy due to cardiac arrest  -improving but still disoriented,  possible delirium    NSTEMI (non-ST elevated myocardial infarction)  History of MI (myocardial infarction)  Patient with chest pain worsened while on HD  Trops are flat 0.14  EKG with no ischemic changes  Echo with worsening wall motion as per cardio  Seen by cardiology, plan was for Left heart cath 06/09 but patient had a cardiac arrest that morning; managing medically for now  - continue ASA/plavix/statin  - Cardiology following        Patient with chest pain worsened while on HD  Trops are flat 0.14  EKG with no ischemic changes  Echo with worsening wall motion as per cardio  Seen by cardiology, plan was for Left heart cath 06/09 but patient had a cardiac arrest that morning; managing medically for now  - continue ASA/plavix/statin  - Cardiology following      Patient with chest pain worsened while on HD  Trops are flat 0.14  EKG with no ischemic changes  Echo with worsening wall motion as per cardio  Seen by cardiology, plan was for Left heart cath 06/09 but patient had a cardiac arrest that  morning; managing medically for now  - continue ASA/plavix/statin  - Cardiology following        Patient with chest pain worsened while on HD  Trops are flat 0.14  EKG with no ischemic changes  Echo with worsening wall motion as per cardio  Seen by cardiology, plan was for Left heart cath 06/09 but patient had a cardiac arrest that morning; managing medically for now  - continue ASA/plavix/statin  - Cardiology following      Patient with chest pain worsened while on HD  Trops are flat 0.14  EKG with no ischemic changes  Echo with worsening wall motion as per cardio  Seen by cardiology, plan was for Left heart cath 06/09 but patient had a cardiac arrest that morning; managing medically for now  - continue ASA/plavix/statin > if patient tolerate heparin drip, we will transition to a DOAC then can stop aspirin, continue Plavix  - Cardiology following        Patient with chest pain worsened while on HD  Trops are flat 0.14  EKG with no ischemic changes  Echo with worsening wall motion as per cardio  Seen by cardiology, plan was for Left heart cath 06/09 but patient had a cardiac arrest that morning; managing medically for now  - continue ASA/plavix/statin  - Cardiology following      Patient with chest pain worsened while on HD  Trops are flat 0.14  EKG with no ischemic changes  Echo with worsening wall motion as per cardio  Seen by cardiology, plan was for Left heart cath 06/09 but patient had a cardiac arrest that morning; managing medically for now  - continue ASA/plavix/statin  - Cardiology following        Patient with chest pain worsened while on HD  Trops are flat 0.14  EKG with no ischemic changes  Echo with worsening wall motion as per cardio  Seen by cardiology, plan was for Left heart cath 06/09 but patient had a cardiac arrest that morning; managing medically for now  - continue ASA/plavix/statin  - Cardiology following      Patient with chest pain worsened while on HD  Trops are flat 0.14  EKG with no  ischemic changes  Echo with worsening wall motion as per cardio  Seen by cardiology, plan was for Left heart cath 06/09 but patient had a cardiac arrest that morning; managing medically for now  - continue ASA/plavix/statin  - Cardiology following        Patient with chest pain worsened while on HD  Trops are flat 0.14  EKG with no ischemic changes  Echo with worsening wall motion as per cardio  Seen by cardiology, plan was for Left heart cath 06/09 but patient had a cardiac arrest that morning; managing medically for now  - continue ASA/plavix/statin  - Cardiology following      Patient with chest pain worsened while on HD  Trops are flat 0.14  EKG with no ischemic changes  Echo with worsening wall motion as per cardio  Seen by cardiology, plan was for Left heart cath 06/09 but patient had a cardiac arrest that morning; managing medically for now  - continue ASA/plavix/statin  - Cardiology following        Patient with chest pain worsened while on HD  Trops are flat 0.14  EKG with no ischemic changes  Echo with worsening wall motion as per cardio  Seen by cardiology, plan was for Left heart cath 06/09 but patient had a cardiac arrest that morning; managing medically for now  - continue ASA/plavix/statin  - Cardiology following      Patient with chest pain worsened while on HD  Trops are flat 0.14  EKG with no ischemic changes  Echo with worsening wall motion as per cardio  Seen by cardiology, plan was for Left heart cath 06/09 but patient had a cardiac arrest that morning; managing medically for now  - continue ASA/plavix/statin > if patient tolerate heparin drip, we will transition to a DOAC then can stop aspirin, continue Plavix  - Cardiology following        Patient with chest pain worsened while on HD  Trops are flat 0.14  EKG with no ischemic changes  Echo with worsening wall motion as per cardio  Seen by cardiology, plan was for Left heart cath 06/09 but patient had a cardiac arrest that morning; managing  medically for now  - continue ASA/plavix/statin  - Cardiology following      Patient with chest pain worsened while on HD  Trops are flat 0.14  EKG with no ischemic changes  Echo with worsening wall motion as per cardio  Seen by cardiology, plan was for Left heart cath 06/09 but patient had a cardiac arrest that morning; managing medically for now  - continue ASA/plavix/statin  - Cardiology following        Patient with chest pain worsened while on HD  Trops are flat 0.14  EKG with no ischemic changes  Echo with worsening wall motion as per cardio  Seen by cardiology, plan was for Left heart cath 06/09 but patient had a cardiac arrest that morning; managing medically for now  - continue ASA/plavix/statin  - Cardiology following      Cardiogenic shock (Resolved: 6/17/2025)  This patient has shock. The type of shock is cardiogenic due to ischemic. The patient has the following evidence of shock: persistent hypotension and BRETT. The patient will be admitted to an intensive care unit, they will be treated with levophed; now de-escalated.  - cardiogenic shock has now resolved    HFrEF (heart failure with reduced ejection fraction)  - likely ischemic  - volume control with dialysis    Repeat echo showed   Left Ventricle: The left ventricle is moderately dilated. Mildly increased wall thickness. There is eccentric hypertrophy. Regional wall motion abnormalities present. See diagram for wall motion findings. There is moderately reduced systolic function with a visually estimated ejection fraction of 35 - 40%. Quantitated ejection fraction is 38%. Unable to assess diastolic function due to poor image quality.    Right Ventricle: The right ventricle is normal in size Systolic function is normal. TAPSE is 2.3 cm.    Overall the study quality was technically difficult.  Paroxysmal atrial fibrillation  Patient has long standing persistent (>12 months) atrial fibrillation. Patient is currently in sinus rhythm. [id number] Score: 1.  The patients heart rate in the last 24 hours is as follows:  Pulse  Min: 71  Max: 105     Antiarrhythmics  metoprolol succinate (TOPROL-XL) 24 hr tablet 100 mg, 2 times daily, Oral  amiodarone tablet 400 mg, 2 times daily, Oral  amiodarone tablet 200 mg, Daily, Oral    Anticoagulants       Plan  - Replete lytes with a goal of K>4, Mg >2  - Patient is anticoagulated, see medications listed above.  - Patient's afib is currently controlled    Acute respiratory failure with hypoxia  Resolved, currently on room air  ESRD (end stage renal disease)   >>ASSESSMENT AND PLAN FOR ESRD (END STAGE RENAL DISEASE) WRITTEN ON 6/7/2025  5:21 PM BY ERICH FRAZIER MD    Creatine stable for now. BMP reviewed- noted Estimated Creatinine Clearance: 10.5 mL/min (A) (based on SCr of 7.4 mg/dL (H)). according to latest data. Based on current GFR, CKD stage is end stage.  Monitor UOP and serial BMP and adjust therapy as needed. Renally dose meds. Avoid nephrotoxic medications and procedures.    ESRD On HD,  Primary hypertension  Patient's blood pressure range in the last 24 hours was: BP  Min: 83/52  Max: 148/55.The patient's inpatient anti-hypertensive regimen is listed below:  Current Antihypertensives  losartan tablet 25 mg, Daily, Oral  metoprolol succinate (TOPROL-XL) 24 hr tablet 100 mg, 2 times daily, Oral    Plan  - BP is controlled, no changes needed to their regimen  - holding antihypertensives in setting of arrest/shock    Hyperlipidemia  -Continue statin  Anemia due to chronic kidney disease, on chronic dialysis  Anemia is likely due to chronic disease due to ESRD. Most recent hemoglobin and hematocrit are listed below.  Recent Labs     06/16/25  0353 06/16/25  0924 06/17/25  0401   HGB 8.3* 8.2* 7.7*   HCT 25.5* 25.0* 24.2*     Plan  - Monitor serial CBC: Daily  - Transfuse PRBC if patient becomes hemodynamically unstable, symptomatic or H/H drops below 7/21.  - Patient has not received any PRBC transfusions to date.  -  Patient's anemia is currently stable  -will recheck CBC to see if this is an actual drop  Hypothyroidism  Cont levothyroxine     Atherosclerosis of native coronary artery of native heart with unstable angina pectoris  Patient with known CAD s/p stent placement, which is controlled Will continue ASA, Plavix, and Statin and monitor for S/Sx of angina/ACS. Continue to monitor on telemetry.   Prolonged Q-T interval on ECG  -continue to monitor    Hypokalemia  Patient's most recent potassium results are listed below.   Recent Labs     06/16/25  0353 06/16/25  1605 06/17/25  0401   K 3.4* 4.2 4.5     Plan  - Replete potassium per protocol  - Monitor potassium Daily  - Patient's hypokalemia is stable    VTE Risk Mitigation (From admission, onward)           Ordered     IP VTE HIGH RISK PATIENT  Once         06/07/25 1155     Place sequential compression device  Until discontinued         06/07/25 1155                    Discharge Planning   FLACO: 6/19/2025     Code Status: Full Code   Medical Readiness for Discharge Date:   Discharge Plan A: Rehab   Discharge Delays:  (pending medical stability)        Critical care time spent on the evaluation and treatment of severe organ dysfunction, review of pertinent labs and imaging studies, discussions with consulting providers and discussions with patient/family: 25 minutes.    Please place Justification for DME        Nima Ervin MD  Department of Hospital Medicine   [facility] - Intensive Care

## 2025-06-17 NOTE — ASSESSMENT & PLAN NOTE
Patient's most recent potassium results are listed below.   Recent Labs     06/16/25  0353 06/16/25  1605 06/17/25  0401   K 3.4* 4.2 4.5     Plan  - Replete potassium per protocol  - Monitor potassium Daily  - Patient's hypokalemia is stable

## 2025-06-17 NOTE — EICU
TYLERU Night Rounds Checklist  24H Vital Sign Range:  Temp:  [97.5 °F (36.4 °C)-99.2 °F (37.3 °C)]   Pulse:  []   Resp:  [9-28]   BP: ()/(49-70)   SpO2:  [95 %-100 %]     Video rounds and LDA reconciliation

## 2025-06-17 NOTE — ASSESSMENT & PLAN NOTE
>>ASSESSMENT AND PLAN FOR ESRD (END STAGE RENAL DISEASE) WRITTEN ON 6/7/2025  5:21 PM BY ERICH FRAZIER MD    Creatine stable for now. BMP reviewed- noted Estimated Creatinine Clearance: 10.5 mL/min (A) (based on SCr of 7.4 mg/dL (H)). according to latest data. Based on current GFR, CKD stage is end stage.  Monitor UOP and serial BMP and adjust therapy as needed. Renally dose meds. Avoid nephrotoxic medications and procedures.    ESRD On HD,

## 2025-06-17 NOTE — PT/OT/SLP PROGRESS
Physical Therapy      Patient Name:  Domingo Gross   MRN:  [id number]    Patient not seen today secondary to Dialysis. Will follow-up as able.    6/17/25- 13:13

## 2025-06-17 NOTE — SUBJECTIVE & OBJECTIVE
Review of Systems   Cardiovascular:  Negative for chest pain and dyspnea on exertion.     Objective:     Vital Signs (Most Recent):  Temp: 98.1 °F (36.7 °C) (06/17/25 0715)  Pulse: 76 (06/17/25 0923)  Resp: 13 (06/17/25 0900)  BP: (!) 117/59 (06/17/25 0923)  SpO2: 100 % (06/17/25 0900) Vital Signs (24h Range):  Temp:  [97.5 °F (36.4 °C)-99.2 °F (37.3 °C)] 98.1 °F (36.7 °C)  Pulse:  [] 76  Resp:  [9-28] 13  SpO2:  [96 %-100 %] 100 %  BP: ()/(49-70) 117/59     Weight: 81.6 kg (179 lb 14.3 oz)  Body mass index is 25.81 kg/m².     SpO2: 100 %         Intake/Output Summary (Last 24 hours) at 6/17/2025 1052  Last data filed at 6/17/2025 0600  Gross per 24 hour   Intake 801.35 ml   Output --   Net 801.35 ml       Lines/Drains/Airways       Peripheral Intravenous Line  Duration                  Hemodialysis AV Fistula Left upper arm -- days         Peripheral IV - Single Lumen 06/12/25 1816 20 G Anterior;Right Forearm 4 days         Peripheral IV - Single Lumen 06/13/25 1445 20 G Anterior;Right Wrist 3 days                       Physical Exam  Constitutional:       General: He is not in acute distress.     Appearance: He is well-developed.   Neck:      Vascular: No JVD.   Cardiovascular:      Rate and Rhythm: Normal rate and regular rhythm.      Heart sounds: No murmur heard.     No gallop.   Pulmonary:      Effort: Pulmonary effort is normal. No respiratory distress.      Breath sounds: Normal breath sounds. No wheezing.   Abdominal:      General: Bowel sounds are normal. There is no distension.      Palpations: Abdomen is soft.      Tenderness: There is no abdominal tenderness.   Musculoskeletal:      Cervical back: Normal range of motion and neck supple.   Skin:     General: Skin is warm and dry.   Neurological:      Mental Status: He is alert and oriented to person, place, and time.   Psychiatric:         Behavior: Behavior normal.         Thought Content: Thought content normal.         Judgment:  Judgment normal.                Significant Imaging: Echocardiogram: Transthoracic echo (TTE) complete (Cupid Only):   Results for orders placed or performed during the hospital encounter of 06/07/25   Echo   Result Value Ref Range    BSA 2.01 m2    Child's Biplane MOD Ejection Fraction 33 %    A2C EF 25 %    A4C EF 37 %    LVIDd 4.4 3.5 - 6.0 cm    LV Systolic Volume 55 mL    LV Systolic Volume Index 27.5 mL/m2    LVIDs 3.6 2.1 - 4.0 cm    LV Diastolic Volume 89 mL    LV Diastolic Volume Index 44.50 mL/m2    LV EDV A2C 100.248320163955008 mL    LV EDV A4C 109.71 mL    Left Ventricular End Systolic Volume by Teichholz Method 54.60 mL    Left Ventricular End Diastolic Volume by Teichholz Method 88.67 mL    IVS 0.9 0.6 - 1.1 cm    FS 18.2 (A) 28 - 44 %    Left Ventricle Relative Wall Thickness 0.50 cm    PW 1.1 0.6 - 1.1 cm    LV mass 147.8 g    LV Mass Index 73.9 g/m2    ZLVIDS 0.05     ZLVIDD -2.81     Narrative      Left Ventricle: The left ventricle is normal in size. Mildly increased   wall thickness. Global hypokinesis present with some regional variability.    Septal motion is consistent with bundle branch block. There is moderately   reduced systolic function with a visually estimated ejection fraction of   30 - 35%.    Right Ventricle: The right ventricle is normal in size Systolic   function is normal.

## 2025-06-17 NOTE — PT/OT/SLP PROGRESS
Occupational Therapy      Patient Name:  Domingo Gross   MRN:  [id number]    Patient not seen today secondary to Dialysis. Will follow-up as able.    6/17/2025

## 2025-06-17 NOTE — ASSESSMENT & PLAN NOTE
- echo with EF 30-35%   - on BB and Hydralazine as an outpatient- initially held due to pressor use with gradual addition  - continue ARB and Toprol XL

## 2025-06-18 NOTE — ASSESSMENT & PLAN NOTE
Patient's most recent potassium results are listed below.   Recent Labs     06/16/25  1605 06/17/25  0401 06/18/25  0404   K 4.2 4.5 5.0     Plan  - Replete potassium per protocol  - Monitor potassium Daily  - Patient's hypokalemia is resolved

## 2025-06-18 NOTE — ASSESSMENT & PLAN NOTE
- history of PAF with EKG in 2023 with PAF; EKGs this admission with NSR; intermittent recurrent episodes of afib with RVR throughout admission   - remains in  NSR;  transitioned to oral Amiodarone 400mg po BID x 10 days then 200mg daily thereafter   - continue Toprol  BID; OAC on hold due to anemia  - CHADSVAS score 3 (CHF, HTN, PAD) with indication for chronic AC for stroke prevention however currently bleeding risk high    - monitor on telemetry

## 2025-06-18 NOTE — EICU
Intervention Initiated From:  COR / ORLYU    Margie intervened regarding:  Rounding (Video assessment)    VICU Night Rounds Checklist  24H Vital Sign Range:  Temp:  [97.5 °F (36.4 °C)-99.2 °F (37.3 °C)]   Pulse:  []   Resp:  [9-24]   BP: ()/(49-71)   SpO2:  [98 %-100 %]     Video rounds

## 2025-06-18 NOTE — EICU
Virtual ICU Quality Rounds    Admit Date: 6/7/2025  Hospital Day: 11    ICU Day: 9d 3h    24H Vital Sign Range:  Temp:  [97.8 °F (36.6 °C)-98.5 °F (36.9 °C)]   Pulse:  [74-88]   Resp:  [10-24]   BP: ()/(53-71)   SpO2:  [100 %]     VICU Surveillance Screening

## 2025-06-18 NOTE — ASSESSMENT & PLAN NOTE
- BP 90s-100s overnight  - was on ARB, BB and Hydralazine as an outpatient  - on Toprol Xl and ARB  - goal BP less than 130/80  - BP well controlled; monitor BP and adjust meds prn

## 2025-06-18 NOTE — PLAN OF CARE
06/18/25 1020   Rounds   Attendance Provider;Nurse    Discharge Plan A Rehab  ([facility])   Why the patient remains in the hospital Requires continued medical care   Transition of Care Barriers Transportation       1020  Patient resting quietly in the recliner when JUAREZ participated in SIBR with Dr Ervin. No family present.      Pt was admitted with cardiac arrest & continues to be followed by cards, neph, & PT/OT/SLP.     MD stated that the pt is medically stable to stepdown from ICU today.     Pt lives at home with his spouse, is independent of all ADLS, receives HD treatments at [facility] ([facility]) via [facility]. Pt is being followed by [facility].     1540  CM informed the pt's spouse, Karmen Gross ([phone]), via phone of the pt's discharge status. Karmen verbalized understanding, agreement, appreciation.      Will continue to follow.

## 2025-06-18 NOTE — SUBJECTIVE & OBJECTIVE
Review of Systems   Constitutional: Negative for chills, decreased appetite, diaphoresis, fever, malaise/fatigue, weight gain and weight loss.   Cardiovascular:  Negative for chest pain, claudication, dyspnea on exertion, irregular heartbeat, leg swelling, near-syncope, orthopnea, palpitations and paroxysmal nocturnal dyspnea.   Respiratory:  Negative for cough, shortness of breath, snoring, sputum production and wheezing.    Endocrine: Negative for cold intolerance, heat intolerance, polydipsia, polyphagia and polyuria.   Skin:  Negative for color change, dry skin, itching, nail changes and poor wound healing.   Musculoskeletal:  Negative for back pain, gout, joint pain and joint swelling.   Gastrointestinal:  Negative for bloating, abdominal pain, constipation, diarrhea, hematemesis, hematochezia, melena, nausea and vomiting.   Genitourinary:  Negative for dysuria, hematuria and nocturia.   Neurological:  Negative for dizziness, headaches, light-headedness, numbness, paresthesias and weakness.   Psychiatric/Behavioral:  Negative for altered mental status, depression and memory loss.      Objective:     Vital Signs (Most Recent):  Temp: 98.1 °F (36.7 °C) (06/18/25 1130)  Pulse: 75 (06/18/25 1200)  Resp: 12 (06/18/25 1200)  BP: (!) 108/56 (06/18/25 1200)  SpO2: 100 % (06/18/25 1200) Vital Signs (24h Range):  Temp:  [98.1 °F (36.7 °C)-98.5 °F (36.9 °C)] 98.1 °F (36.7 °C)  Pulse:  [75-88] 75  Resp:  [10-25] 12  SpO2:  [100 %] 100 %  BP: ()/(53-71) 108/56     Weight: 81.6 kg (179 lb 14.3 oz)  Body mass index is 25.81 kg/m².     SpO2: 100 %         Intake/Output Summary (Last 24 hours) at 6/18/2025 1349  Last data filed at 6/18/2025 0918  Gross per 24 hour   Intake 387 ml   Output 1500 ml   Net -1113 ml       Lines/Drains/Airways       Peripheral Intravenous Line  Duration                  Hemodialysis AV Fistula Left upper arm -- days         Peripheral IV - Single Lumen 06/13/25 1445 20 G Anterior;Right  Wrist 4 days                       Physical Exam  Constitutional:       General: He is not in acute distress.     Appearance: He is well-developed. He is ill-appearing.   Neck:      Vascular: No JVD.   Cardiovascular:      Rate and Rhythm: Normal rate and regular rhythm.      Heart sounds: No murmur heard.     No gallop.   Pulmonary:      Effort: Pulmonary effort is normal. No respiratory distress.      Breath sounds: Normal breath sounds. No wheezing.   Abdominal:      General: Bowel sounds are normal. There is no distension.      Palpations: Abdomen is soft.      Tenderness: There is no abdominal tenderness.   Musculoskeletal:      Cervical back: Normal range of motion and neck supple.   Skin:     General: Skin is warm and dry.   Neurological:      Mental Status: He is alert.      Comments: Disoriented    Psychiatric:         Behavior: Behavior normal.         Thought Content: Thought content normal.         Judgment: Judgment normal.                Significant Imaging: Echocardiogram: Transthoracic echo (TTE) complete (Cupid Only):   Results for orders placed or performed during the hospital encounter of 06/07/25   Echo   Result Value Ref Range    BSA 2.01 m2    Child's Biplane MOD Ejection Fraction 33 %    A2C EF 25 %    A4C EF 37 %    LVIDd 4.4 3.5 - 6.0 cm    LV Systolic Volume 55 mL    LV Systolic Volume Index 27.5 mL/m2    LVIDs 3.6 2.1 - 4.0 cm    LV Diastolic Volume 89 mL    LV Diastolic Volume Index 44.50 mL/m2    LV EDV A2C [id number] mL    LV EDV A4C 109.71 mL    Left Ventricular End Systolic Volume by Teichholz Method 54.60 mL    Left Ventricular End Diastolic Volume by Teichholz Method 88.67 mL    IVS 0.9 0.6 - 1.1 cm    FS 18.2 (A) 28 - 44 %    Left Ventricle Relative Wall Thickness 0.50 cm    PW 1.1 0.6 - 1.1 cm    LV mass 147.8 g    LV Mass Index 73.9 g/m2    ZLVIDS 0.05     ZLVIDD -2.81     Narrative      Left Ventricle: The left ventricle is normal in size. Mildly increased   wall  thickness. Global hypokinesis present with some regional variability.    Septal motion is consistent with bundle branch block. There is moderately   reduced systolic function with a visually estimated ejection fraction of   30 - 35%.    Right Ventricle: The right ventricle is normal in size Systolic   function is normal.

## 2025-06-18 NOTE — PROGRESS NOTES
Nephrology Consult   Note     Consult Requested By: Nima Ervin MD  Reason for Consult: ESRD     SUBJECTIVE:     ?    Review of Systems   Constitutional:  Negative for chills and fever.   Respiratory:  Negative for cough and shortness of breath.    Cardiovascular:  Negative for chest pain and leg swelling.   Gastrointestinal:  Negative for nausea.               OBJECTIVE:     Vital Signs (Most Recent)  Vitals:    06/18/25 1007 06/18/25 1100 06/18/25 1130 06/18/25 1200   BP: (!) 110/59 (!) 99/55  (!) 108/56   Pulse: 80 78  75   Resp: 18 15  12   Temp:   98.1 °F (36.7 °C)    TempSrc:   Oral    SpO2: 100% 100%  100%   Weight:       Height:             Date 06/18/25 0700 - 06/19/25 0659   Shift 5020-3751 0244-2176 5977-6798 24 Hour Total   INTAKE   P.O. 387   387   Shift Total(mL/kg) 387(4.7)   387(4.7)   OUTPUT   Shift Total(mL/kg)       Weight (kg) 81.6 81.6 81.6 81.6               Medications:   [START ON 6/27/2025] amiodarone  200 mg Oral Daily    amiodarone  400 mg Oral BID    aspirin  81 mg Oral Daily    atorvastatin  40 mg Oral QHS    clopidogreL  75 mg Oral Daily    folic acid  1 mg Oral Daily    levothyroxine  75 mcg Oral Before breakfast    LIDOcaine  1 patch Transdermal Q24H    losartan  25 mg Oral Daily    metoprolol succinate  100 mg Oral BID    mupirocin   Nasal BID    perflutren protein-a microsphr  0.5 mL Intravenous Once    sevelamer carbonate  1,600 mg Oral TID WM             Physical Exam  Vitals and nursing note reviewed.   Constitutional:       General: He is not in acute distress.     Appearance: He is not diaphoretic.   HENT:      Head: Normocephalic and atraumatic.      Mouth/Throat:      Pharynx: No oropharyngeal exudate.   Eyes:      General: No scleral icterus.     Conjunctiva/sclera: Conjunctivae normal.      Pupils: Pupils are equal, round, and reactive to light.   Cardiovascular:      Rate and Rhythm: Normal rate and regular rhythm.      Heart sounds: Normal heart sounds. No murmur  heard.  Pulmonary:      Effort: Pulmonary effort is normal. No respiratory distress.      Breath sounds: No rales.   Abdominal:      General: Bowel sounds are normal. There is no distension.      Palpations: Abdomen is soft.      Tenderness: There is no abdominal tenderness.   Musculoskeletal:         General: Normal range of motion.      Cervical back: Normal range of motion and neck supple.      Right lower leg: No edema.      Left lower leg: No edema.      Comments: LUE AVG    Skin:     General: Skin is warm and dry.      Findings: No erythema.   Neurological:      Mental Status: He is alert and oriented to person, place, and time.      Cranial Nerves: No cranial nerve deficit.      Comments:     Psychiatric:         Mood and Affect: Affect normal.         Cognition and Memory: Memory is impaired.         Judgment: Judgment normal.         Laboratory:  Recent Labs   Lab 06/17/25  0401 06/17/25  1648 06/18/25  0404   WBC 8.48 9.37 9.90   HGB 7.7* 8.1* 7.9*   HCT 24.2* 24.6* 24.5*   * 155 165   MONO 7.3  0.62 6.8  0.64 8.5  0.84     Recent Labs   Lab 06/16/25  0353 06/16/25  1605 06/17/25  0401 06/18/25  0404   * 132* 132* 133*   K 3.4* 4.2 4.5 5.0   CL 99 98 98 101   CO2 19* 19* 17* 19*   BUN 71* 79* 89* 69*   CREATININE 5.5* 6.6* 7.4* 6.3*   CALCIUM 8.4* 8.5* 8.5* 8.5*   PHOS 4.8*  --  5.6* 4.7*       Diagnostic Results:  X-Ray: Reviewed  US: Reviewed  Echo: Reviewed    Left Ventricle: The left ventricle is mildly dilated. There is eccentric hypertrophy. Regional wall motion abnormalities present. See diagram for wall motion findings. There is mildly reduced systolic function with a visually estimated ejection fraction of 45 - 50%. There is indeterminate diastolic function.Elevated left ventricular filling pressure.    Right Ventricle: Normal right ventricular cavity size. Wall thickness is normal. Systolic function is normal.    Left Atrium: Left atrium is mildly dilated.    Right Atrium: Right  atrium is mildly dilated.    Mitral Valve: There is mild to moderate regurgitation.    Pulmonic Valve: There is moderate regurgitation.    Pulmonary Artery: There is pulmonary hypertension. The estimated pulmonary artery systolic pressure is 47 mmHg.    IVC/SVC: Intermediate venous pressure at 8 mmHg.  ASSESSMENT/PLAN:    Patient  sent from his regular dialysis after completing his treatment develop chest pain  a cardiac arrest while on the floor.  ESRD on HD TTS with Dr Loly Payne  in [facility][1]      Status post cardiac arrest.  Doing excellent considering what he went through.    Did not dialyze on Tuesday were too unstable.   Tolerated dialysis well on Wednesday Thursday  and Saturday   Resume his regular Tuesday Thursday Saturday schedule, plan for dialysis Tuesday    Patient with a history of persistent hypokalemia.  But when his cardiac arrest has been his potassium was 3.9.  Both days on Wednesday and yesterday patient ran on 4K bath  Potassium today 4.4.  Magnesium is 2.1.   Episodes of AFib with RVR .  Unlikely to be related to electrolyte disturbances but replace K+ close to 4   Keep HD TTS for now when close to discharge to Rehab transition to F     Hypokalemia   -- Replace K+ to 4.0       2. HTN    was hypotensive after cardiac arrest, all of the blood pressure medications on hold.  Levophed was weaned  off     3. 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  Hold Epogen in the settings of the cardiac arrest  with underlying CAD  Recent Labs   Lab 06/17/25  0401 06/17/25  1648 06/18/25  0404   HGB 7.7* 8.1* 7.9*   HCT 24.2* 24.6* 24.5*   * 155 165       Iron   Lab Results   Component Value Date    IRON 63 06/09/2025    TIBC 232 (L) 06/09/2025    FERRITIN 2,942.0 (H) 06/11/2025       4. MBD - PTH at goal     Sevelamer with each meal- Resume  Ergocalciferol 60109 units weekly    Recent Labs   Lab 06/13/25  0420 06/16/25  1605 06/17/25  0401   MG 2.1 2.3  2.2       Lab Results   Component Value Date    .8 (H) 06/04/2025    CALCIUM 8.5 (L) 06/18/2025    CAION 1.31 07/14/2020    PHOS 4.7 (H) 06/18/2025     Lab Results   Component Value Date    [id number] 9 (L) 03/14/2024     Acidosis - correct with HD   Lab Results   Component Value Date    CO2 19 (L) 06/18/2025     Hemodialysis access-left upper arm loop AV graft.     Nutrition/Hypoalbuminemia    Recent Labs   Lab 06/17/25  0401 06/18/25  0404   ALBUMIN 2.1* 2.2*     Nepro with meals TID.   -- poor appetite       Thank you for the consult, will follow  With any question please call [phone]  Nena Arriola MD    [facility]    ESPERANZA Payne MD,   MD BRANDY Arredondo MD E. V. Harmon, NP    [address]  [place], [address]  [phone]

## 2025-06-18 NOTE — ASSESSMENT & PLAN NOTE
Anemia is likely due to chronic disease due to ESRD. Most recent hemoglobin and hematocrit are listed below.  Recent Labs     06/17/25  0401 06/17/25  1648 06/18/25  0404   HGB 7.7* 8.1* 7.9*   HCT 24.2* 24.6* 24.5*     Plan  - Monitor serial CBC: Daily  - Transfuse PRBC if patient becomes hemodynamically unstable, symptomatic or H/H drops below 7/21.  - Patient has not received any PRBC transfusions to date.  - Patient's anemia is currently stable

## 2025-06-18 NOTE — PLAN OF CARE
Problem: Adult Inpatient Plan of Care  Goal: Plan of Care Review  Outcome: Progressing  Goal: Patient-Specific Goal (Individualized)  Outcome: Progressing  Goal: Absence of Hospital-Acquired Illness or Injury  Outcome: Progressing  Goal: Optimal Comfort and Wellbeing  Outcome: Progressing  Goal: Readiness for Transition of Care  Outcome: Progressing     Pt AAOX3. NSR on monitor throughout shift. Afebrile. O2 sats stable on RA. Q6 accucheks performed, no coverage needed. No BM. 3hr HD session, 1L removed. PRN oxycodone x1  administered due to c/o of rib pain. Patient safety maintained; Plan of care reviewed with patient. Report given to SKINNY RN.

## 2025-06-18 NOTE — PLAN OF CARE
Problem: Adult Inpatient Plan of Care  Goal: Readiness for Transition of Care  Outcome: Progressing     Problem: Adult Inpatient Plan of Care  Goal: Optimal Comfort and Wellbeing  Outcome: Progressing     Problem: Wound  Goal: Improved Oral Intake  Outcome: Progressing     Problem: Fall Injury Risk  Goal: Absence of Fall and Fall-Related Injury  Outcome: Progressing     Problem: Comorbidity Management  Goal: Blood Pressure in Desired Range  Outcome: Progressing     Problem: Hemodialysis  Goal: Safe, Effective Therapy Delivery  Outcome: Progressing     Problem: Delirium  Goal: Improved Sleep  Outcome: Progressing     Problem: Dysrhythmia  Goal: Normalized Cardiac Rhythm  Outcome: Progressing     Pt AAOX3. NSR on monitor. Amio and Hep gtt d/c. Bp stable. O2 sats WNL on RA. Q6 accucheks performed, no coverage needed. No BM. 3hr HD session, 1L removed. POC reviewed with pt. Transfer orders in place. Report given to PM NICOLAS

## 2025-06-18 NOTE — ASSESSMENT & PLAN NOTE
- extensive PCI history with LAD and RCA PCI 3/58952, LCX 2019, LAD REZA 2023, LCX PCI 5/2023, LCX PTCA lithotripsy 9/2024 LCX PTCA 1/2025  -  presented with chest pain with concern for USA/NSTEMI LHC deferred due to cardiac arrest  - plan for medical management for now with DAPT, statin ARB and BB; will plan to continue medical management for now; no plans for invasive strategy; patient very debilitated and recovering from acute event; if able to initiate Eliquis then  will stop ASA but continue Plavix

## 2025-06-18 NOTE — PLAN OF CARE
Improving. Goal added 2/2 functional progress. Continue OT POC, cotreatment overlap with PT for safety and to progress functional outcomes. On last session, patient with significant BP drop from supine to seated vs this date does have initial BP drop but improves with continued activity to WFL at end of session out of bed to chair. Improved LB dressing to moderate assist via supine bridge method. Recommend high intensity therapy;  patient can tolerate 3 or more hours of therapy per day with modifications / rest breaks, and would significantly benefit from level of intensity.    Problem: Occupational Therapy  Goal: Occupational Therapy Goal  Description: Goals to be met by: 07/14     Patient will increase functional independence with ADLs by performing:    UE Dressing with Stand-by Assistance.  LE Dressing with Stand-by Assistance.  Grooming while seated at sink with Stand-by Assistance.  Toileting from toilet with Stand-by Assistance for hygiene and clothing management.   Toilet transfer to toilet with Contact Guard Assistance.  Increased functional strength to WFL for ADLs.  Goal added on 6/18/2025: Functional mobility to / from bathroom with PRN least restrictive device and supervision without adverse response    Outcome: Progressing

## 2025-06-18 NOTE — ASSESSMENT & PLAN NOTE
Patient's blood pressure range in the last 24 hours was: BP  Min: 89/56  Max: 125/63.The patient's inpatient anti-hypertensive regimen is listed below:  Current Antihypertensives  losartan tablet 25 mg, Daily, Oral  metoprolol succinate (TOPROL-XL) 24 hr tablet 100 mg, 2 times daily, Oral    Plan  - BP is controlled, no changes needed to their regimen  - holding antihypertensives in setting of arrest/shock

## 2025-06-18 NOTE — PLAN OF CARE
Problem: Adult Inpatient Plan of Care  Goal: Plan of Care Review  Outcome: Progressing  Goal: Patient-Specific Goal (Individualized)  Outcome: Progressing  Goal: Absence of Hospital-Acquired Illness or Injury  Outcome: Progressing  Goal: Optimal Comfort and Wellbeing  Outcome: Progressing  Goal: Readiness for Transition of Care  Outcome: Progressing     Problem: Wound  Goal: Optimal Coping  Outcome: Progressing  Goal: Optimal Functional Ability  Outcome: Progressing  Goal: Absence of Infection Signs and Symptoms  Outcome: Progressing  Goal: Improved Oral Intake  Outcome: Progressing  Goal: Optimal Pain Control and Function  Outcome: Progressing  Goal: Skin Health and Integrity  Outcome: Progressing  Goal: Optimal Wound Healing  Outcome: Progressing     Problem: Fall Injury Risk  Goal: Absence of Fall and Fall-Related Injury  Outcome: Progressing     Problem: Comorbidity Management  Goal: Blood Pressure in Desired Range  Outcome: Progressing     Problem: Hemodialysis  Goal: Safe, Effective Therapy Delivery  Outcome: Progressing  Goal: Effective Tissue Perfusion  Outcome: Progressing  Goal: Absence of Infection Signs and Symptoms  Outcome: Progressing     Problem: Infection  Goal: Absence of Infection Signs and Symptoms  Outcome: Progressing     Problem: Skin Injury Risk Increased  Goal: Skin Health and Integrity  Outcome: Progressing     Problem: Delirium  Goal: Optimal Coping  Outcome: Progressing  Goal: Improved Behavioral Control  Outcome: Progressing  Goal: Improved Attention and Thought Clarity  Outcome: Progressing  Goal: Improved Sleep  Outcome: Progressing     Problem: Mechanical Ventilation Invasive  Goal: Effective Communication  Outcome: Progressing  Goal: Optimal Device Function  Outcome: Progressing  Goal: Optimal Nutrition Delivery  Outcome: Progressing  Goal: Absence of Device-Related Skin and Tissue Injury  Outcome: Progressing  Goal: Absence of Ventilator-Induced Lung Injury  Outcome: Progressing      Problem: Dysrhythmia  Goal: Normalized Cardiac Rhythm  Outcome: Progressing

## 2025-06-18 NOTE — SUBJECTIVE & OBJECTIVE
Interval History:     I have seen and examined the patient today  Patient is awake and alert     Sitting at bedside chair, more talkative today, he is slightly eating better, but said at home he is used to eat one meal a day only.    Review of Systems   Unable to perform ROS: Mental status change     Objective:     Vital Signs (Most Recent):  Temp: 98.1 °F (36.7 °C) (06/18/25 1130)  Pulse: 75 (06/18/25 1500)  Resp: 13 (06/18/25 1500)  BP: (!) 117/58 (06/18/25 1500)  SpO2: 100 % (06/18/25 1500) Vital Signs (24h Range):  Temp:  [98.1 °F (36.7 °C)-98.5 °F (36.9 °C)] 98.1 °F (36.7 °C)  Pulse:  [75-88] 75  Resp:  [10-25] 13  SpO2:  [100 %] 100 %  BP: ()/(53-71) 117/58     Weight: 81.6 kg (179 lb 14.3 oz)  Body mass index is 25.81 kg/m².    Intake/Output Summary (Last 24 hours) at 6/18/2025 1529  Last data filed at 6/18/2025 1350  Gross per 24 hour   Intake 387 ml   Output 100 ml   Net 287 ml         Physical Exam  Constitutional:       Appearance: He is ill-appearing.   HENT:      Head: Normocephalic and atraumatic.   Cardiovascular:      Rate and Rhythm: Normal rate. Rhythm irregular.   Pulmonary:      Effort: Pulmonary effort is normal.      Breath sounds: Normal breath sounds.   Skin:     General: Skin is warm.   Neurological:      General: No focal deficit present.      Mental Status: He is alert.               Significant Labs: All pertinent labs within the past 24 hours have been reviewed.    Significant Imaging: I have reviewed all pertinent imaging results/findings within the past 24 hours.

## 2025-06-18 NOTE — ASSESSMENT & PLAN NOTE
>>ASSESSMENT AND PLAN FOR ESRD (END STAGE RENAL DISEASE) WRITTEN ON 6/7/2025  5:21 PM BY ERICH FRAZIER MD    Creatine stable for now. BMP reviewed- noted Estimated Creatinine Clearance: 12.4 mL/min (A) (based on SCr of 6.3 mg/dL (H)). according to latest data. Based on current GFR, CKD stage is end stage.  Monitor UOP and serial BMP and adjust therapy as needed. Renally dose meds. Avoid nephrotoxic medications and procedures.    ESRD On HD,

## 2025-06-18 NOTE — ASSESSMENT & PLAN NOTE
Patient has long standing persistent (>12 months) atrial fibrillation. Patient is currently in sinus rhythm. [id number] Score: 1. The patients heart rate in the last 24 hours is as follows:  Pulse  Min: 75  Max: 88     Antiarrhythmics  metoprolol succinate (TOPROL-XL) 24 hr tablet 100 mg, 2 times daily, Oral  amiodarone tablet 400 mg, 2 times daily, Oral  amiodarone tablet 200 mg, Daily, Oral    Anticoagulants       Plan  - Replete lytes with a goal of K>4, Mg >2  - Patient is anticoagulated, see medications listed above.  - Patient's afib is currently controlled  -AC was held for high bleeding risk

## 2025-06-18 NOTE — PT/OT/SLP PROGRESS
Physical Therapy Treatment    Patient Name:  Domingo Gross   MRN:  [id number]    Recommendations:     Discharge Recommendations: High Intensity Therapy  Discharge Equipment Recommendations: walker, rolling, bedside commode, to be determined by next level of care  Barriers to discharge: limited functional endurance/independence    Assessment:     Domingo Gross is a 64 y.o. male admitted with a medical diagnosis of Cardiac arrest.  He presents with the following impairments/functional limitations: weakness, impaired endurance, impaired self care skills, impaired functional mobility, gait instability, impaired balance, pain, decreased safety awareness, decreased lower extremity function, impaired cardiopulmonary response to activity.      PT/OT co-session to safely progress OOB mobility. Pt participates in bed mobility, transfers to standing and short distance transfer/mobility to bedside chair with use of RW. Therapy will continue to progress pt as able.     Rehab Prognosis: Good; patient would benefit from acute skilled PT services to address these deficits and reach maximum level of function.    Recent Surgery: Procedure(s) (LRB):  ANGIOGRAM, CORONARY ARTERY (N/A)      Plan:     During this hospitalization, patient to be seen 5 x/week to address the identified rehab impairments via gait training, therapeutic activities, therapeutic exercises, neuromuscular re-education and progress toward the following goals:    Plan of Care Expires:  07/14/25    Subjective     Chief Complaint: chest soreness; some dizziness with mobility  Patient/Family Comments/goals: to progress mobility  Pain/Comfort:  Pain Rating 1:  (chest soreness more R side vs L)  Pain Addressed 1: Reposition, Cessation of Activity, Nurse notified  Pain Rating Post-Intervention 1:  (not rated)      Objective:     Communicated with Nurse prior to session.  Patient found HOB elevated with blood pressure cuff, pulse ox (continuous), telemetry upon PT  entry to room.     General Precautions: Standard, fall  Orthopedic Precautions: N/A  Braces: N/A  Respiratory Status: Room air     Functional Mobility:  Bed Mobility:     Rolling Left:  minimum assistance  Rolling Right: unable to complete due to R sided chest soreness  Supine to Sit: moderate assistance  Transfers:     Sit to Stand:  MOD A progress to MIN A with further attempts with rolling walker  Bed to Chair: minimum assistance with  rolling walker  using  Step Transfer  Gait: ~1ft with use of RW and MIN A to navigate to bedside chair; completing BLE forward/turning/retro steps      AM-PAC 6 CLICK MOBILITY  Turning over in bed (including adjusting bedclothes, sheets and blankets)?: 3  Sitting down on and standing up from a chair with arms (e.g., wheelchair, bedside commode, etc.): 3  Moving from lying on back to sitting on the side of the bed?: 2  Moving to and from a bed to a chair (including a wheelchair)?: 3  Need to walk in hospital room?: 3  Climbing 3-5 steps with a railing?: 1  Basic Mobility Total Score: 15       Treatment & Education:  Pt's BP initially in supine with HOB elevated 103/59, sitting /59 (mild dizziness), in sitting post standing transfer 83/51 (increasing dizziness), post transfer to bedside chair 85/53, in reclining chair with BLE elevated 91/55, after increased time in bedside chair with BLE elevated 115/56 (resolved dizziness).   Pt educated on safe use/hand positioning with RW.   Pt educated on use of call button for mobility needs in room; pt understanding.     Patient left up in chair with all lines intact, call button in reach, and Nurse notified.    GOALS:   Multidisciplinary Problems       Physical Therapy Goals          Problem: Physical Therapy    Goal Priority Disciplines Outcome Interventions   Physical Therapy Goal     PT, PT/OT Progressing    Description: Goals to be met by: 2025    Patient will increase functional independence with mobility by performin.  Sit<>stand with MOD I with RW.  2. Gait x 75 feet with RW with MOD I.  3. Supine<>sit with SBA.   4. Bed to chair MOD I with use of RW                          DME Justifications:  The mobility limitation cannot be sufficiently resolved by the use of a cane.   Patient's functional mobility deficit can be sufficiently resolved with the use of a rolling walker. Patient's mobility limitation significantly impairs their ability to participate in one of more activities of daily living. The use of a rolling walker will significantly improve the patient's ability to participate in MRADLS and the patient will use it on regular basis in the home.     This patient requires a bedside commode / 3 in 1 commode because they are physically incapable of utilizing their regular toilet facility for a 30-90 day period due to current non-ambulatory status. The patient will be confined to a single room or to one level of the home and there is no toilet on that level, or there are no toilet facilities in the home.        Time Tracking:     PT Received On: 06/18/25  PT Start Time: 0904     PT Stop Time: 0932  PT Total Time (min): 28 min With OT    Billable Minutes: Therapeutic Activity 24    Treatment Type: Treatment  PT/PTA: PT     Number of PTA visits since last PT visit: 0     06/18/2025

## 2025-06-18 NOTE — ASSESSMENT & PLAN NOTE
- H&H 7.9&24.5 this AM down from 8-9/25-28 previous days  - anemia felt to be related to AOCD; remains on DAPT;' will hold OAC and IV Heparin   - monitor H&H closely; may need repeat PRBC transfusion if H&H trends down further

## 2025-06-18 NOTE — PROGRESS NOTES
[facility] Medicine  Progress Note    Patient Name: Domingo Gross  MRN: [id number]  Patient Class: IP- Inpatient   Admission Date: 6/7/2025  Length of Stay: 11 days  Attending Physician: Nima Ervin MD  Primary Care Provider: Geeta Coffey MD        Subjective     Principal Problem:Cardiac arrest        HPI:  64 y.o. male with PMH ESRD on HD, PAD with multiple interventions, CAD (mid LAD/prox RCA PCI 3/2019 and prox LCA in 10/2019 following out of hospital cardiac arrest), after cardiac arrest in setting of prox LCX occlusion treated with PCI, 9/16/2024 s/p intervention w cutting/shockwave PTCA to Lcx, 01/24/25 PCI of OM, new reduction in EF presents to ER with substernal chest pain that radiates to his throat. Report his chest pain feels like heaviness as if something is heavy sitting on his chest, started last night at rest, he took sublingual nitro which only slightly helped but he was still feeling it, today as he was having HD the pain got worse so he came to the ED.    Patient denies smoking or alcohol use        Overview/Hospital Course:  No notes on file    Interval History:     I have seen and examined the patient today  Patient is awake and alert     Sitting at bedside chair, more talkative today, he is slightly eating better, but said at home he is used to eat one meal a day only.    Review of Systems   Unable to perform ROS: Mental status change     Objective:     Vital Signs (Most Recent):  Temp: 98.1 °F (36.7 °C) (06/18/25 1130)  Pulse: 75 (06/18/25 1500)  Resp: 13 (06/18/25 1500)  BP: (!) 117/58 (06/18/25 1500)  SpO2: 100 % (06/18/25 1500) Vital Signs (24h Range):  Temp:  [98.1 °F (36.7 °C)-98.5 °F (36.9 °C)] 98.1 °F (36.7 °C)  Pulse:  [75-88] 75  Resp:  [10-25] 13  SpO2:  [100 %] 100 %  BP: ()/(53-71) 117/58     Weight: 81.6 kg (179 lb 14.3 oz)  Body mass index is 25.81 kg/m².    Intake/Output Summary (Last 24 hours) at 6/18/2025 4365  Last data filed at  6/18/2025 1350  Gross per 24 hour   Intake 387 ml   Output 100 ml   Net 287 ml         Physical Exam  Constitutional:       Appearance: He is ill-appearing.   HENT:      Head: Normocephalic and atraumatic.   Cardiovascular:      Rate and Rhythm: Normal rate. Rhythm irregular.   Pulmonary:      Effort: Pulmonary effort is normal.      Breath sounds: Normal breath sounds.   Skin:     General: Skin is warm.   Neurological:      General: No focal deficit present.      Mental Status: He is alert.               Significant Labs: All pertinent labs within the past 24 hours have been reviewed.    Significant Imaging: I have reviewed all pertinent imaging results/findings within the past 24 hours.      Assessment & Plan  Cardiac arrest  Patient had cardiac arrest requiring ACLS x3 on 6/9  Remarkable improvement  - extubated on 06/11.  Tolerating well  - Pulmonology and Cardiology following    Anoxic encephalopathy due to cardiac arrest  -improving but still disoriented,  possible delirium    NSTEMI (non-ST elevated myocardial infarction)  History of MI (myocardial infarction)  Patient with chest pain worsened while on HD  Trops are flat 0.14  EKG with no ischemic changes  Echo with worsening wall motion as per cardio  Seen by cardiology, plan was for Left heart cath 06/09 but patient had a cardiac arrest that morning; managing medically for now  - continue ASA/plavix/statin  - Cardiology following      Patient with chest pain worsened while on HD  Trops are flat 0.14  EKG with no ischemic changes  Echo with worsening wall motion as per cardio  Seen by cardiology, plan was for Left heart cath 06/09 but patient had a cardiac arrest that morning; managing medically for now  - continue ASA/plavix/statin  - Cardiology following          HFrEF (heart failure with reduced ejection fraction)  - likely ischemic  - volume control with dialysis    Repeat echo showed   Left Ventricle: The left ventricle is moderately dilated. Mildly  increased wall thickness. There is eccentric hypertrophy. Regional wall motion abnormalities present. See diagram for wall motion findings. There is moderately reduced systolic function with a visually estimated ejection fraction of 35 - 40%. Quantitated ejection fraction is 38%. Unable to assess diastolic function due to poor image quality.    Right Ventricle: The right ventricle is normal in size Systolic function is normal. TAPSE is 2.3 cm.    Overall the study quality was technically difficult.  Paroxysmal atrial fibrillation  Patient has long standing persistent (>12 months) atrial fibrillation. Patient is currently in sinus rhythm. [id number] Score: 1. The patients heart rate in the last 24 hours is as follows:  Pulse  Min: 75  Max: 88     Antiarrhythmics  metoprolol succinate (TOPROL-XL) 24 hr tablet 100 mg, 2 times daily, Oral  amiodarone tablet 400 mg, 2 times daily, Oral  amiodarone tablet 200 mg, Daily, Oral    Anticoagulants       Plan  - Replete lytes with a goal of K>4, Mg >2  - Patient is anticoagulated, see medications listed above.  - Patient's afib is currently controlled  -AC was held for high bleeding risk    Acute respiratory failure with hypoxia  Resolved, currently on room air  ESRD (end stage renal disease)   >>ASSESSMENT AND PLAN FOR ESRD (END STAGE RENAL DISEASE) WRITTEN ON 6/7/2025  5:21 PM BY ERICH FRAZIER MD    Creatine stable for now. BMP reviewed- noted Estimated Creatinine Clearance: 12.4 mL/min (A) (based on SCr of 6.3 mg/dL (H)). according to latest data. Based on current GFR, CKD stage is end stage.  Monitor UOP and serial BMP and adjust therapy as needed. Renally dose meds. Avoid nephrotoxic medications and procedures.    ESRD On HD,  Primary hypertension  Patient's blood pressure range in the last 24 hours was: BP  Min: 89/56  Max: 125/63.The patient's inpatient anti-hypertensive regimen is listed below:  Current Antihypertensives  losartan tablet 25 mg, Daily,  Oral  metoprolol succinate (TOPROL-XL) 24 hr tablet 100 mg, 2 times daily, Oral    Plan  - BP is controlled, no changes needed to their regimen  - holding antihypertensives in setting of arrest/shock    Hyperlipidemia  -Continue statin  Anemia due to chronic kidney disease, on chronic dialysis  Anemia is likely due to chronic disease due to ESRD. Most recent hemoglobin and hematocrit are listed below.  Recent Labs     06/17/25  0401 06/17/25  1648 06/18/25  0404   HGB 7.7* 8.1* 7.9*   HCT 24.2* 24.6* 24.5*     Plan  - Monitor serial CBC: Daily  - Transfuse PRBC if patient becomes hemodynamically unstable, symptomatic or H/H drops below 7/21.  - Patient has not received any PRBC transfusions to date.  - Patient's anemia is currently stable    Hypothyroidism  Cont levothyroxine     Atherosclerosis of native coronary artery of native heart with unstable angina pectoris  Patient with known CAD s/p stent placement, which is controlled Will continue ASA, Plavix, and Statin and monitor for S/Sx of angina/ACS. Continue to monitor on telemetry.   Prolonged Q-T interval on ECG  -continue to monitor    Hypokalemia  Patient's most recent potassium results are listed below.   Recent Labs     06/16/25  1605 06/17/25  0401 06/18/25  0404   K 4.2 4.5 5.0     Plan  - Replete potassium per protocol  - Monitor potassium Daily  - Patient's hypokalemia is resolved    VTE Risk Mitigation (From admission, onward)           Ordered     IP VTE HIGH RISK PATIENT  Once         06/07/25 1155     Place sequential compression device  Until discontinued         06/07/25 1155                    Discharge Planning   FLACO: 6/19/2025     Code Status: Full Code   Medical Readiness for Discharge Date:   Discharge Plan A: Rehab ([facility])   Discharge Delays:  (pending medical stability)        Critical care time spent on the evaluation and treatment of severe organ dysfunction, review of pertinent labs and imaging studies, discussions with consulting  providers and discussions with patient/family: 25 minutes.    Please place Justification for DME        Nima Ervin MD  Department of Hospital Medicine   [place] - Intensive Care

## 2025-06-18 NOTE — PT/OT/SLP PROGRESS
Occupational Therapy   Treatment    Name: Domingo Gross  MRN: [id number]  Admitting Diagnosis:  Cardiac arrest       Recommendations:     Discharge Recommendations: High Intensity Therapy  Discharge Equipment Recommendations:  to be determined by next level of care (at this time: recommend rolling walker, bedside commode, shower chair)  Barriers to discharge:   (medical acuity; below baseline function)    Assessment:     Doimngo Gross is a 64 y.o. male with a medical diagnosis of Cardiac arrest.  He presents with .The primary encounter diagnosis was NSTEMI (non-ST elevated myocardial infarction). Diagnoses of Chest pain, HFrEF (heart failure with reduced ejection fraction), Hypokalemia, ESRD (end stage renal disease), Unstable angina pectoris, Elevated troponin, Cardiac arrest, Acute hypoxemic respiratory failure, Prolonged Q-T interval on ECG, and Anoxic encephalopathy due to cardiac arrest were also pertinent to this visit.  . Performance deficits affecting function are weakness, impaired cognition, impaired self care skills, impaired functional mobility, gait instability, impaired balance, pain, impaired cardiopulmonary response to activity, decreased safety awareness.     Improving. Goal added 2/2 functional progress. Continue OT POC, cotreatment overlap with PT for safety and to progress functional outcomes. On last session, patient with significant BP drop from supine to seated vs this date does have initial BP drop but improves with continued activity to WFL at end of session out of bed to chair. Improved LB dressing to moderate assist via supine bridge method. Recommend high intensity therapy;  patient can tolerate 3 or more hours of therapy per day with modifications / rest breaks, and would significantly benefit from level of intensity.        Rehab Prognosis:  Good; patient would benefit from acute skilled OT services to address these deficits and reach maximum level of function.       Plan:      Patient to be seen 5 x/week to address the above listed problems via self-care/home management, therapeutic activities, therapeutic exercises  Plan of Care Expires: 07/14/25  Plan of Care Reviewed with: patient    Subjective     Chief Complaint: chest discomfort (mild); increased pain/ unable to tolerate rolling to right side due to strain on right flank  Patient/Family Comments/goals: dizziness sitting eob (resolves); indicates low appetite  Pain/Comfort:  Pain Rating 1:  (mild chest soreness; mild R flank discomfort)  Pain Addressed 1: Reposition, Cessation of Activity, Nurse notified  Pain Rating Post-Intervention 1: other (see comments) (indicates feeling less pain in chest area at end of session (RN aware))    Objective:     Communicated with: nursing prior to session.  Patient found HOB elevated with blood pressure cuff, telemetry, pulse ox (continuous) (ICU monitoring) upon OT entry to room.    General Precautions: Standard, fall    Orthopedic Precautions:N/A  Braces: N/A  Respiratory Status: Room air ; > 99%     Occupational Performance:     Bed Mobility:    Patient completed Rolling/Turning to Left with  minimal assist, initiation cues after terminating effort to roll to right which caused increased pain  Patient completed Supine to Sit with moderate assist, scooting to eob with minimal assist, increased time and cues to anterior weightshift     Functional Mobility/Transfers:  Patient completed Sit <> Stand Transfer with rolling walker and allowance of pulling up with BUE to stand with moderate assist first effort, seated break and minimal assist second effort  with  mod verbal cues to forward gaze   Patient completed Bed <> Chair Transfer using Step Transfer technique with minimal assist with rolling walker and line management assist    Activities of Daily Living:  Feeding:  supervision to setup ; prompting cues to encourage PO intake; tray simplification and positioning upright assist  Lower Body  "Dressing: moderate assistance ; via supine bridge backward chaining performance      [facility] 6 Click ADL: 14    Treatment & Education:  Patient oriented to self, hospital, month/year. Indicating general discomfort (just finished rolling/ assisted hygiene by nursing). Guided for ADL / bed mobility / functional mobility as above. Cotreatment with PT for efficacy. Vitals monitored.  Start of session supine HOB elevated at 103/59, 78 bpm.  Sitting eob "little dizzy" at 100/59, 81 bpm  Upon immediately sitting eob after standing (unable to stand long enough for BP to cycle) at 83/51.   Sitting in chair at 85/53.   Sitting in chair with BLE elevated and post guided UE/ LE simple movements at 91/55.  Upon sitting in chair x5 minutes end of session at 115/56.  Educated on call light with reciprocation    Patient left up in chair with all lines intact and nursing notified    GOALS:   Multidisciplinary Problems       Occupational Therapy Goals          Problem: Occupational Therapy    Goal Priority Disciplines Outcome Interventions   Occupational Therapy Goal     OT, PT/OT Progressing    Description: Goals to be met by: 07/14     Patient will increase functional independence with ADLs by performing:    UE Dressing with Stand-by Assistance.  LE Dressing with Stand-by Assistance.  Grooming while seated at sink with Stand-by Assistance.  Toileting from toilet with Stand-by Assistance for hygiene and clothing management.   Toilet transfer to toilet with Contact Guard Assistance.  Increased functional strength to WFL for ADLs.  Goal added on 6/18/2025: Functional mobility to / from bathroom with PRN least restrictive device and supervision without adverse response                         DME Justifications:  .The mobility limitation cannot be sufficiently resolved by the use of a cane.   Patient's functional mobility deficit can be sufficiently resolved with the use of a rolling walker. Patient's mobility limitation significantly " impairs their ability to participate in one of more activities of daily living. The use of a rolling walker will significantly improve the patient's ability to participate in MRADLS and the patient will use it on regular basis in the home.       This patient would BENEFIT  from a bedside commode, because they are minimally ambulatory and lack the endurance due to their current medical diagnosis, to ambulate as far as required to their usual toilet facility.      Time Tracking:     OT Date of Treatment: 06/18/25  OT Start Time: 0904  OT Stop Time: 0932  OT Total Time (min): 28 min    Billable Minutes:Self Care/Home Management 15 min  Therapeutic Activity 8 min    OT/AMANDA: OT      .. A client care conference was performed between the LOTR and [facility], prior to treatment by [facility]ORD, to discuss the patient's status, treatment plan and established goals.      6/18/2025

## 2025-06-18 NOTE — PROGRESS NOTES
[facility] - Intensive Care  Cardiology  Progress Note    Patient Name: Domingo Gross  MRN: [id number]  Admission Date: 6/7/2025  Hospital Length of Stay: 11 days  Code Status: Full Code   Attending Physician: Nima Ervin MD   Primary Care Physician: Geeta Coffey MD  Expected Discharge Date: 6/19/2025  Principal Problem:Cardiac arrest    Subjective:     Hospital Course:     Per Dr. Salazar consult note 6/7/2025 64 y.o. male with PMH ESRD on HD, PAD with multiple interventions, CAD (mid LAD/prox RCA PCI 3/2019 and prox LCA in 10/2019 following out of hospital cardiac arrest), after cardiac arrest in setting of prox LCX occlusion treated with PCI, 9/16/2024 s/p intervention w cutting/shockwave PTCA to Lcx, 01/24/25 PCI of OM, new reduction in EF presents to ER with substernal chest pain that radiates to his throat. Reports his chest pain started last night which did not improved with nitro. Trops with significant delta from baseline. Concerning symptoms given extensive cardiovascular history.   CAD with multiple interventions/ NSTEMI: start heparin gtt, hold eliquis, continue DAPT, statin, bb  HFrEF - continue BB, will continue to optimize GDMT   Afib per EP- holding eliquis, continue heparin   ESRD on HD- please consult nephro   -NPO Sunday MN FOR CATH+/-PCI Monday. If refractory chest pain despite meds contact me - low threshold for cath.   -Trend troponin    6/9/2025 Cardiac arrest with PEA with ACLS initiated and VTach as well. LHC cancelled   Per  Cardiac arrest- PEA ARREST multiples ACLS round. qTC >600ms .   Multiple cardiac arrest in patient with extensive CAD history  ESRD  NSTEMI   Plan:   -DAPT  -Levophed  prn to keep MAP > 65  -Continue DAPT (recent PCI of Lcx)  -HOLDING PO MEDS cautious with IV amiodarone meds   -Monitor Qtc and electrolytes  -Extubate on BiPAP per ICU team.   -Please contact me for any cardiac question     6/13/2025 HD yesterday with 1L removed. H&H down to 7.4&22.5 this AM  Recurrent afib with RVR this AM. BP stable overnight     Per Dr Moran progress note 6/15/2025  1. Cardiac arrest/ PEA arrest, and reported VT during the code  2. History of coronary artery disease status post multiple PCI  3. Heart failure reduced EF/ischemic cardiomyopathy  4. ESRD on dialysis  5. Paroxysmal Afib  6. Anemia post PRBCs transfusion        Plan  Good recovery post cardiac arrest from cardiac standpoint.  Still with confusion  We will recommend medical therapy from cardiac standpoint at this time  We will continue IV amiodarone for time being.  Likely we will switch to p.o. with the next 48 hours     Monitor QT closely  Continue aspirin and Plavix.  Anticoagulation on hold given his anemia which is stable.   I will recommend starting systemic anticoagulation with heparin and monitor H&H closely and switch to DOAC if H&H stable  Once started on full-dose anticoagulation we can stop aspirin and continue Plavix  Continue Coreg 12.5 mg b.i.d.  Continue hydralazine  Continue losartan  6/16/2025 Recurrent afib with RVR over the weekend requiring resumption of IV Amiodarone. Converted to NSR and remains in NSR. SBP 90s-110s overnight CBC with H&H 8.3&25.5 down from 9.0&28.0 BMP with Na 133 K+ 3.4- replacement ordered. Initial EKG this AM at 0700 with NSR with repeat EKG at 0900 with ?afib; -547  6/17/2025  Converted to NSR overnight- remains on IV Amiodarone. CBC with H&H down to 7.7&24.2 from 8&25 yesterday- IV Heparin drip resumed yesterday. SBP 90s-100s BMP with K+ 4.5  6/18/2025 Currently in NSR SBP 90s-1110s CBC with H&H 7.9&24.5 BMP with Na 133 K+ 5.0. OOB to chair this AM. No recurrent afib or VTach noted on telemetry overnight               Review of Systems   Constitutional: Negative for chills, decreased appetite, diaphoresis, fever, malaise/fatigue, weight gain and weight loss.   Cardiovascular:  Negative for chest pain, claudication, dyspnea on exertion, irregular heartbeat, leg swelling,  near-syncope, orthopnea, palpitations and paroxysmal nocturnal dyspnea.   Respiratory:  Negative for cough, shortness of breath, snoring, sputum production and wheezing.    Endocrine: Negative for cold intolerance, heat intolerance, polydipsia, polyphagia and polyuria.   Skin:  Negative for color change, dry skin, itching, nail changes and poor wound healing.   Musculoskeletal:  Negative for back pain, gout, joint pain and joint swelling.   Gastrointestinal:  Negative for bloating, abdominal pain, constipation, diarrhea, hematemesis, hematochezia, melena, nausea and vomiting.   Genitourinary:  Negative for dysuria, hematuria and nocturia.   Neurological:  Negative for dizziness, headaches, light-headedness, numbness, paresthesias and weakness.   Psychiatric/Behavioral:  Negative for altered mental status, depression and memory loss.      Objective:     Vital Signs (Most Recent):  Temp: 98.1 °F (36.7 °C) (06/18/25 1130)  Pulse: 75 (06/18/25 1200)  Resp: 12 (06/18/25 1200)  BP: (!) 108/56 (06/18/25 1200)  SpO2: 100 % (06/18/25 1200) Vital Signs (24h Range):  Temp:  [98.1 °F (36.7 °C)-98.5 °F (36.9 °C)] 98.1 °F (36.7 °C)  Pulse:  [75-88] 75  Resp:  [10-25] 12  SpO2:  [100 %] 100 %  BP: ()/(53-71) 108/56     Weight: 81.6 kg (179 lb 14.3 oz)  Body mass index is 25.81 kg/m².     SpO2: 100 %         Intake/Output Summary (Last 24 hours) at 6/18/2025 1349  Last data filed at 6/18/2025 0918  Gross per 24 hour   Intake 387 ml   Output 1500 ml   Net -1113 ml       Lines/Drains/Airways       Peripheral Intravenous Line  Duration                  Hemodialysis AV Fistula Left upper arm -- days         Peripheral IV - Single Lumen 06/13/25 1445 20 G Anterior;Right Wrist 4 days                       Physical Exam  Constitutional:       General: He is not in acute distress.     Appearance: He is well-developed. He is ill-appearing.   Neck:      Vascular: No JVD.   Cardiovascular:      Rate and Rhythm: Normal rate and regular  rhythm.      Heart sounds: No murmur heard.     No gallop.   Pulmonary:      Effort: Pulmonary effort is normal. No respiratory distress.      Breath sounds: Normal breath sounds. No wheezing.   Abdominal:      General: Bowel sounds are normal. There is no distension.      Palpations: Abdomen is soft.      Tenderness: There is no abdominal tenderness.   Musculoskeletal:      Cervical back: Normal range of motion and neck supple.   Skin:     General: Skin is warm and dry.   Neurological:      Mental Status: He is alert.      Comments: Disoriented    Psychiatric:         Behavior: Behavior normal.         Thought Content: Thought content normal.         Judgment: Judgment normal.                Significant Imaging: Echocardiogram: Transthoracic echo (TTE) complete (Cupid Only):   Results for orders placed or performed during the hospital encounter of 06/07/25   Echo   Result Value Ref Range    BSA 2.01 m2    Child's Biplane MOD Ejection Fraction 33 %    A2C EF 25 %    A4C EF 37 %    LVIDd 4.4 3.5 - 6.0 cm    LV Systolic Volume 55 mL    LV Systolic Volume Index 27.5 mL/m2    LVIDs 3.6 2.1 - 4.0 cm    LV Diastolic Volume 89 mL    LV Diastolic Volume Index 44.50 mL/m2    LV EDV A2C 100.472388864503752 mL    LV EDV A4C 109.71 mL    Left Ventricular End Systolic Volume by Teichholz Method 54.60 mL    Left Ventricular End Diastolic Volume by Teichholz Method 88.67 mL    IVS 0.9 0.6 - 1.1 cm    FS 18.2 (A) 28 - 44 %    Left Ventricle Relative Wall Thickness 0.50 cm    PW 1.1 0.6 - 1.1 cm    LV mass 147.8 g    LV Mass Index 73.9 g/m2    ZLVIDS 0.05     ZLVIDD -2.81     Narrative      Left Ventricle: The left ventricle is normal in size. Mildly increased   wall thickness. Global hypokinesis present with some regional variability.    Septal motion is consistent with bundle branch block. There is moderately   reduced systolic function with a visually estimated ejection fraction of   30 - 35%.    Right Ventricle: The right  ventricle is normal in size Systolic   function is normal.       Assessment and Plan:     Brief HPI: Seen on AM NP rounds while sitting in bedside chair. Appears weak but slightly stronger than yesterday. Complained of chest pain with coughing. Discussed POC as detailed below-verbalized understanding and agrees with POC      * Cardiac arrest  - PEA arrest last week with ACLS with ROSC; reports of VTach; loaded with IV Amiodarone and placed on IV Amiodarone drip- discontinuation due to prolonged QTC  - QTC improved; no recurrent ventricular tachyarrhythmias; intermittent recurrent afib with RVR requiring resumption of IV Amiodarone- back in NSR will transition to oral Amiodarone   - monitor closely; maintain K+ >4.0 Mg >2.0    Prolonged Q-T interval on ECG  - EKG last week with  treated with Amiodarone due to ?VT; QT improved most recent EKG this AM with   - will monitor QTC with Amiodarone use     NSTEMI (non-ST elevated myocardial infarction)  - presented with chest pain; initial troponin 0.1-0.3 with trend up to 1.9 after cardiac arrest  - history of CAD; originally planned for LHC but deferred given cardiac arrest; GDMT as detailed previously   - no plans for LHC as of now     HFrEF (heart failure with reduced ejection fraction)  - echo with EF 30-35%   - on BB and Hydralazine as an outpatient- initially held due to pressor use with gradual addition  - continue ARB and Toprol XL     Atherosclerosis of native coronary artery of native heart with unstable angina pectoris  - extensive PCI history with LAD and RCA PCI 3/51474, LCX 2019, LAD REZA 2023, LCX PCI 5/2023, LCX PTCA lithotripsy 9/2024 LCX PTCA 1/2025  -  presented with chest pain with concern for USA/NSTEMI LHC deferred due to cardiac arrest  - plan for medical management for now with DAPT, statin ARB and BB; will plan to continue medical management for now; no plans for invasive strategy; patient very debilitated and recovering from acute  event; if able to initiate Eliquis then  will stop ASA but continue Plavix     Paroxysmal atrial fibrillation  - history of PAF with EKG in 2023 with PAF; EKGs this admission with NSR; intermittent recurrent episodes of afib with RVR throughout admission   - remains in  NSR;  transitioned to oral Amiodarone 400mg po BID x 10 days then 200mg daily thereafter   - continue Toprol  BID; OAC on hold due to anemia  - CHADSVAS score 3 (CHF, HTN, PAD) with indication for chronic AC for stroke prevention however currently bleeding risk high    - monitor on telemetry     Anemia due to chronic kidney disease, on chronic dialysis  - H&H 7.9&24.5 this AM down from 8-9/25-28 previous days  - anemia felt to be related to AOCD; remains on DAPT;' will hold OAC and IV Heparin   - monitor H&H closely; may need repeat PRBC transfusion if H&H trends down further    Primary hypertension  - BP 90s-100s overnight  - was on ARB, BB and Hydralazine as an outpatient  - on Toprol Xl and ARB  - goal BP less than 130/80  - BP well controlled; monitor BP and adjust meds prn         VTE Risk Mitigation (From admission, onward)           Ordered     IP VTE HIGH RISK PATIENT  Once         06/07/25 1155     Place sequential compression device  Until discontinued         06/07/25 1155                    BAO Ignacio, ANP  Cardiology  [place] [facility] - Intensive Care

## 2025-06-18 NOTE — PLAN OF CARE
Problem: Physical Therapy  Goal: Physical Therapy Goal  Description: Goals to be met by: 2025    Patient will increase functional independence with mobility by performin. Sit<>stand with MOD I with RW.  2. Gait x 75 feet with RW with MOD I.  3. Supine<>sit with SBA.   4. Bed to chair MOD I with use of RW     Outcome: Progressing     PT/OT co-session to safely progress OOB mobility. Pt participates in bed mobility, transfers to standing and short distance transfer/mobility to bedside chair with use of RW. Therapy will continue to progress pt as able.

## 2025-06-19 PROBLEM — E87.6 HYPOKALEMIA: Status: RESOLVED | Noted: 2025-01-01 | Resolved: 2025-01-01

## 2025-06-19 NOTE — PROGRESS NOTES
[facility] Medicine  Progress Note    Patient Name: Domingo Gross  MRN: [id number]  Patient Class: IP- Inpatient   Admission Date: 6/7/2025  Length of Stay: 12 days  Attending Physician: Nima Ervin MD  Primary Care Provider: Geeta Coffey MD        Subjective     Principal Problem:Cardiac arrest        HPI:  64 y.o. male with PMH ESRD on HD, PAD with multiple interventions, CAD (mid LAD/prox RCA PCI 3/2019 and prox LCA in 10/2019 following out of hospital cardiac arrest), after cardiac arrest in setting of prox LCX occlusion treated with PCI, 9/16/2024 s/p intervention w cutting/shockwave PTCA to Lcx, 01/24/25 PCI of OM, new reduction in EF presents to ER with substernal chest pain that radiates to his throat. Report his chest pain feels like heaviness as if something is heavy sitting on his chest, started last night at rest, he took sublingual nitro which only slightly helped but he was still feeling it, today as he was having HD the pain got worse so he came to the ED.    Patient denies smoking or alcohol use        Overview/Hospital Course:  No notes on file    Interval History:     I have seen and examined the patient today  He is awake and alert, went for HD today, will give 1 U PRBC with HD.    Review of Systems   Constitutional: Negative.    HENT: Negative.     Respiratory: Negative.     Cardiovascular: Negative.    Gastrointestinal: Negative.    Genitourinary: Negative.    Musculoskeletal: Negative.    Skin: Negative.    Neurological: Negative.    Psychiatric/Behavioral: Negative.       Objective:     Vital Signs (Most Recent):  Temp: 97.6 °F (36.4 °C) (06/19/25 1010)  Pulse: 74 (06/19/25 1223)  Resp: 18 (06/19/25 1421)  BP: (!) 115/58 (06/19/25 0723)  SpO2: 96 % (06/19/25 0837) Vital Signs (24h Range):  Temp:  [97.4 °F (36.3 °C)-98.4 °F (36.9 °C)] 97.6 °F (36.4 °C)  Pulse:  [74-80] 74  Resp:  [11-20] 18  SpO2:  [95 %-100 %] 96 %  BP: (103-123)/(55-81) 115/58     Weight: 81.6  kg (179 lb 14.3 oz)  Body mass index is 25.81 kg/m².    Intake/Output Summary (Last 24 hours) at 6/19/2025 1421  Last data filed at 6/19/2025 1345  Gross per 24 hour   Intake 600 ml   Output 1189 ml   Net -589 ml         Physical Exam  Constitutional:       Appearance: He is ill-appearing.   HENT:      Head: Normocephalic and atraumatic.   Cardiovascular:      Rate and Rhythm: Normal rate. Rhythm irregular.   Pulmonary:      Effort: Pulmonary effort is normal.      Breath sounds: Normal breath sounds.   Skin:     General: Skin is warm.   Neurological:      General: No focal deficit present.      Mental Status: He is alert.               Significant Labs: All pertinent labs within the past 24 hours have been reviewed.    Significant Imaging: I have reviewed all pertinent imaging results/findings within the past 24 hours.      Assessment & Plan  Cardiac arrest  Patient had cardiac arrest requiring ACLS x3 on 6/9  Remarkable improvement  - extubated on 06/11.  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  -improving    NSTEMI (non-ST elevated myocardial infarction)  History of MI (myocardial infarction)  Patient with chest pain worsened while on HD  Trops are flat 0.14  EKG with no ischemic changes  Echo with worsening wall motion as per cardio  Seen by cardiology, plan was for Left heart cath 06/09 but patient had a cardiac arrest that morning; managing medically for now  - continue ASA/plavix/statin  - Cardiology following      Patient with chest pain worsened while on HD  Trops are flat 0.14  EKG with no ischemic changes  Echo with worsening wall motion as per cardio  Seen by cardiology, plan was for Left heart cath 06/09 but patient had a cardiac arrest that morning; managing medically for now  - continue ASA/plavix/statin  - Cardiology following            HFrEF (heart failure with reduced ejection fraction)  - likely ischemic  - volume control with dialysis    Repeat echo  showed   Left Ventricle: The left ventricle is moderately dilated. Mildly increased wall thickness. There is eccentric hypertrophy. Regional wall motion abnormalities present. See diagram for wall motion findings. There is moderately reduced systolic function with a visually estimated ejection fraction of 35 - 40%. Quantitated ejection fraction is 38%. Unable to assess diastolic function due to poor image quality.    Right Ventricle: The right ventricle is normal in size Systolic function is normal. TAPSE is 2.3 cm.    Overall the study quality was technically difficult.  Paroxysmal atrial fibrillation  Patient has long standing persistent (>12 months) atrial fibrillation. Patient is currently in sinus rhythm. [id number] Score: 1. The patients heart rate in the last 24 hours is as follows:  Pulse  Min: 74  Max: 80     Antiarrhythmics  metoprolol succinate (TOPROL-XL) 24 hr tablet 100 mg, 2 times daily, Oral  amiodarone tablet 400 mg, 2 times daily, Oral  amiodarone tablet 200 mg, Daily, Oral    Anticoagulants       Plan  - Replete lytes with a goal of K>4, Mg >2  - Patient is anticoagulated, see medications listed above.  - Patient's afib is currently controlled  -AC was held for high bleeding risk and low H&H    Acute respiratory failure with hypoxia  Resolved, currently on room air  ESRD (end stage renal disease)   >>ASSESSMENT AND PLAN FOR ESRD (END STAGE RENAL DISEASE) WRITTEN ON 6/7/2025  5:21 PM BY ERICH FRAZIER MD    Creatine stable for now. BMP reviewed- noted Estimated Creatinine Clearance: 9.4 mL/min (A) (based on SCr of 8.2 mg/dL (H)). according to latest data. Based on current GFR, CKD stage is end stage.  Monitor UOP and serial BMP and adjust therapy as needed. Renally dose meds. Avoid nephrotoxic medications and procedures.    ESRD On HD,  Primary hypertension  Patient's blood pressure range in the last 24 hours was: BP  Min: 103/66  Max: 123/59.The patient's inpatient anti-hypertensive regimen  is listed below:  Current Antihypertensives  metoprolol succinate (TOPROL-XL) 24 hr tablet 100 mg, 2 times daily, Oral    Plan  - BP is controlled, no changes needed to their regimen  - holding antihypertensives in setting of arrest/shock    Hyperlipidemia  -Continue statin  Anemia due to chronic kidney disease, on chronic dialysis  Anemia is likely due to chronic disease due to ESRD. Most recent hemoglobin and hematocrit are listed below.  Recent Labs     06/17/25  1648 06/18/25  0404 06/19/25  0421   HGB 8.1* 7.9* 7.3*   HCT 24.6* 24.5* 22.7*     Plan  - Monitor serial CBC: Daily  - Transfuse PRBC if patient becomes hemodynamically unstable, symptomatic or H/H drops below 7/21.  - Patient has received 1 units of PRBCs on 06/19.  - Patient's anemia is currently stable    Hypothyroidism  Cont levothyroxine     Atherosclerosis of native coronary artery of native heart with unstable angina pectoris  Patient with known CAD s/p stent placement, which is controlled Will continue ASA, Plavix, and Statin and monitor for S/Sx of angina/ACS. Continue to monitor on telemetry.   Prolonged Q-T interval on ECG  -continue to monitor    Hypokalemia (Resolved: 6/19/2025)  Patient's most recent potassium results are listed below.   Recent Labs     06/17/25  0401 06/18/25  0404 06/19/25  0525   K 4.5 5.0 5.4*     Plan  - Replete potassium per protocol  - Monitor potassium Daily  - Patient's hypokalemia is resolved    VTE Risk Mitigation (From admission, onward)           Ordered     IP VTE HIGH RISK PATIENT  Once         06/07/25 1155     Place sequential compression device  Until discontinued         06/07/25 1155                    Discharge Planning   FLACO: 6/20/2025     Code Status: Full Code   Medical Readiness for Discharge Date:   Discharge Plan A: Rehab ([facility])   Discharge Delays:  (pending medical stability)            Please place Justification for DME        Nima Ervin MD  Department of Hospital Medicine    [place] - Telemetry

## 2025-06-19 NOTE — PT/OT/SLP PROGRESS
Speech Language Pathology      Domingo Gross  MRN: [id number]    10:11AM  Patient not seen today secondary to dialysis. Will follow-up as able.       6/19/2025

## 2025-06-19 NOTE — PT/OT/SLP PROGRESS
Occupational Therapy      Patient Name:  Domingo Gross   MRN:  [id number]    Patient not seen today secondary to Dialysis (AM dialysis; PM attempt - per RN, hold therapy, needs an additional IV and pending blood). Will follow-up as able.    6/19/2025

## 2025-06-19 NOTE — PROGRESS NOTES
Patient seen and examined on dialysis. Tolerating HD well  Hb low give 1 U on HD   Vitals:    06/19/25 0514 06/19/25 0723 06/19/25 0837 06/19/25 1010   BP:  (!) 115/58     Pulse:  80 77    Resp: 18 20 17    Temp:  97.8 °F (36.6 °C)  97.6 °F (36.4 °C)   TempSrc:    Tympanic   SpO2:   96%    Weight:       Height:           With any question please call  [phone]  BRANDY Arriola MD    [facility]     ESPERANZA Payne MD,   MD BRANDY Arredondo MD E. V. Harmon, NP I.Goldvarg-Abud, NP    [address]  [place], [id number]

## 2025-06-19 NOTE — ASSESSMENT & PLAN NOTE
Patient had cardiac arrest requiring ACLS x3 on 6/9  Remarkable improvement  - extubated on 06/11.  Tolerating well  - Cardiology following

## 2025-06-19 NOTE — ASSESSMENT & PLAN NOTE
Patient's blood pressure range in the last 24 hours was: BP  Min: 103/66  Max: 123/59.The patient's inpatient anti-hypertensive regimen is listed below:  Current Antihypertensives  metoprolol succinate (TOPROL-XL) 24 hr tablet 100 mg, 2 times daily, Oral    Plan  - BP is controlled, no changes needed to their regimen  - holding antihypertensives in setting of arrest/shock

## 2025-06-19 NOTE — PROGRESS NOTES
06/19/25 1345   Post-Hemodialysis Assessment   Rinseback Volume (mL) 250 mL   Blood Volume Processed (Liters) 48 L   Dialyzer Clearance Lightly streaked   Duration of Treatment 180 minutes   Additional Fluid Intake (mL) 500 mL   Total UF (mL) 1189 mL   Net Fluid Removal 689   Patient Response to Treatment tolerated well   Post-Hemodialysis Comments HD terminated 30 minutes early due to clotting. NDN. VSS. Blood returned. Lines flushed. Needles pulled. Pressure held until hemostasis. Gauze dressing CDI. Report given to RN

## 2025-06-19 NOTE — PT/OT/SLP PROGRESS
Physical Therapy      Patient Name:  Domingo Gross   MRN:  [id number]    1125 -Patient not seen today secondary to off unit in dialysis.  Second attempt 4566 - Pt back from dialysis eating lunch with RN reporting pt will be receiving a blood transfusion shortly.  Will follow-up next tx date.

## 2025-06-19 NOTE — ASSESSMENT & PLAN NOTE
Patient's most recent potassium results are listed below.   Recent Labs     06/17/25  0401 06/18/25  0404 06/19/25  0525   K 4.5 5.0 5.4*     Plan  - Replete potassium per protocol  - Monitor potassium Daily  - Patient's hypokalemia is resolved

## 2025-06-19 NOTE — ASSESSMENT & PLAN NOTE
>>ASSESSMENT AND PLAN FOR ESRD (END STAGE RENAL DISEASE) WRITTEN ON 6/7/2025  5:21 PM BY ERICH FRAZIER MD    Creatine stable for now. BMP reviewed- noted Estimated Creatinine Clearance: 9.4 mL/min (A) (based on SCr of 8.2 mg/dL (H)). according to latest data. Based on current GFR, CKD stage is end stage.  Monitor UOP and serial BMP and adjust therapy as needed. Renally dose meds. Avoid nephrotoxic medications and procedures.    ESRD On HD,

## 2025-06-19 NOTE — ASSESSMENT & PLAN NOTE
Patient has long standing persistent (>12 months) atrial fibrillation. Patient is currently in sinus rhythm. [id number] Score: 1. The patients heart rate in the last 24 hours is as follows:  Pulse  Min: 74  Max: 80     Antiarrhythmics  metoprolol succinate (TOPROL-XL) 24 hr tablet 100 mg, 2 times daily, Oral  amiodarone tablet 400 mg, 2 times daily, Oral  amiodarone tablet 200 mg, Daily, Oral    Anticoagulants       Plan  - Replete lytes with a goal of K>4, Mg >2  - Patient is anticoagulated, see medications listed above.  - Patient's afib is currently controlled  -AC was held for high bleeding risk and low H&H

## 2025-06-19 NOTE — SUBJECTIVE & OBJECTIVE
Interval History:     I have seen and examined the patient today  He is awake and alert, went for HD today, will give 1 U PRBC with HD.    Review of Systems   Constitutional: Negative.    HENT: Negative.     Respiratory: Negative.     Cardiovascular: Negative.    Gastrointestinal: Negative.    Genitourinary: Negative.    Musculoskeletal: Negative.    Skin: Negative.    Neurological: Negative.    Psychiatric/Behavioral: Negative.       Objective:     Vital Signs (Most Recent):  Temp: 97.6 °F (36.4 °C) (06/19/25 1010)  Pulse: 74 (06/19/25 1223)  Resp: 18 (06/19/25 1421)  BP: (!) 115/58 (06/19/25 0723)  SpO2: 96 % (06/19/25 0837) Vital Signs (24h Range):  Temp:  [97.4 °F (36.3 °C)-98.4 °F (36.9 °C)] 97.6 °F (36.4 °C)  Pulse:  [74-80] 74  Resp:  [11-20] 18  SpO2:  [95 %-100 %] 96 %  BP: (103-123)/(55-81) 115/58     Weight: 81.6 kg (179 lb 14.3 oz)  Body mass index is 25.81 kg/m².    Intake/Output Summary (Last 24 hours) at 6/19/2025 1421  Last data filed at 6/19/2025 1345  Gross per 24 hour   Intake 600 ml   Output 1189 ml   Net -589 ml         Physical Exam  Constitutional:       Appearance: He is ill-appearing.   HENT:      Head: Normocephalic and atraumatic.   Cardiovascular:      Rate and Rhythm: Normal rate. Rhythm irregular.   Pulmonary:      Effort: Pulmonary effort is normal.      Breath sounds: Normal breath sounds.   Skin:     General: Skin is warm.   Neurological:      General: No focal deficit present.      Mental Status: He is alert.               Significant Labs: All pertinent labs within the past 24 hours have been reviewed.    Significant Imaging: I have reviewed all pertinent imaging results/findings within the past 24 hours.

## 2025-06-19 NOTE — PLAN OF CARE
0830  CM informed Moriah w/[facility] that the pt stepdown from ICU. Moriah stated that she submitted for ins auth yesterday but is unsure if the pt's BCBS is open today due to the Juneteenth holiday.        06/19/25 0945   Rounds   Attendance Nurse ;Provider   Discharge Plan A Rehab  ([facility])   Why the patient remains in the hospital Requires continued medical care   Transition of Care Barriers Transportation       0945  Patient resting quietly in bed when JUAREZ participated in SIBR with Dr Ervin & nurse Alis. No family present.       Pt was admitted with cardiac arrest, stepdown from ICU on 6/18/2025, & continues to be followed by cards, neph, & PT/OT/SLP.      MD informed the pt that he will need to receive 1U PRBC with his HD treatment today due to H&H 7.3/22.7 this AM & is not medically stable to dc today. Pt verbalized understanding.     5580  Message sent to Moriah informing of the pt's discharge status. CM was informed by Moriah that the pt has not had a BM since 6/13/2025. JUAREZ informed Dr Ervin of above. MD to order miralax.       Will continue to follow.

## 2025-06-19 NOTE — ASSESSMENT & PLAN NOTE
Anemia is likely due to chronic disease due to ESRD. Most recent hemoglobin and hematocrit are listed below.  Recent Labs     06/17/25  1648 06/18/25  0404 06/19/25  0421   HGB 8.1* 7.9* 7.3*   HCT 24.6* 24.5* 22.7*     Plan  - Monitor serial CBC: Daily  - Transfuse PRBC if patient becomes hemodynamically unstable, symptomatic or H/H drops below 7/21.  - Patient has received 1 units of PRBCs on 06/19.  - Patient's anemia is currently stable

## 2025-06-20 ENCOUNTER — ANESTHESIA EVENT (OUTPATIENT)
Dept: CARDIOLOGY | Facility: HOSPITAL | Age: 65
End: 2025-06-20
Payer: COMMERCIAL

## 2025-06-20 ENCOUNTER — ANESTHESIA (OUTPATIENT)
Dept: CARDIOLOGY | Facility: HOSPITAL | Age: 65
End: 2025-06-20
Payer: COMMERCIAL

## 2025-06-20 PROBLEM — G93.1 ANOXIC ENCEPHALOPATHY DUE TO CARDIAC ARREST: Status: RESOLVED | Noted: 2025-01-01 | Resolved: 2025-01-01

## 2025-06-20 PROBLEM — I46.9 ANOXIC ENCEPHALOPATHY DUE TO CARDIAC ARREST: Status: RESOLVED | Noted: 2025-01-01 | Resolved: 2025-01-01

## 2025-06-20 PROBLEM — T82.868A AV FISTULA THROMBOSIS: Status: ACTIVE | Noted: 2025-06-20

## 2025-06-20 PROBLEM — J96.01 ACUTE RESPIRATORY FAILURE WITH HYPOXIA: Status: RESOLVED | Noted: 2025-01-01 | Resolved: 2025-01-01

## 2025-06-20 PROCEDURE — 36556 INSERT NON-TUNNEL CV CATH: CPT | Performed by: STUDENT IN AN ORGANIZED HEALTH CARE EDUCATION/TRAINING PROGRAM

## 2025-06-20 NOTE — NURSING
Patient's TOM AVG with no thrill and bruit, Dr Arriola informed and ordered an IR consult for fistulagram.

## 2025-06-20 NOTE — PLAN OF CARE
Recommendation:  1. Encourage intake of meals & supplements as tolerated.  2. Monitor weight/labs.   3. RD to continue to follow to monitor po intake    Goals:   Pt will tolerate diet with at least 50-75% intake at meals by RD follow up  Nutrition Goal Status: progressing towards goal

## 2025-06-20 NOTE — PROGRESS NOTES
[facility] Medicine  Progress Note    Patient Name: Domingo Gross  MRN: [id number]  Patient Class: IP- Inpatient   Admission Date: 6/7/2025  Length of Stay: 13 days  Attending Physician: Nima Ervin MD  Primary Care Provider: Geeta Coffey MD        Subjective     Principal Problem:Cardiac arrest        HPI:  64 y.o. male with PMH ESRD on HD, PAD with multiple interventions, CAD (mid LAD/prox RCA PCI 3/2019 and prox LCA in 10/2019 following out of hospital cardiac arrest), after cardiac arrest in setting of prox LCX occlusion treated with PCI, 9/16/2024 s/p intervention w cutting/shockwave PTCA to Lcx, 01/24/25 PCI of OM, new reduction in EF presents to ER with substernal chest pain that radiates to his throat. Report his chest pain feels like heaviness as if something is heavy sitting on his chest, started last night at rest, he took sublingual nitro which only slightly helped but he was still feeling it, today as he was having HD the pain got worse so he came to the ED.    Patient denies smoking or alcohol use        Overview/Hospital Course:  No notes on file    Interval History:   I have Seen and examined the patient today  Patient feels okay, picking on his breakfast this morning    He went for dialysis, his AV fistula is clotted, IR was consulted, but patient was not NPO so patient could not have the declot procedure today, so I contacted anesthesia for temporarily line placement for dialysis since patient has hyperkalemia and he will need dialysis today.    Review of Systems   Constitutional: Negative.    HENT: Negative.     Respiratory: Negative.     Cardiovascular: Negative.    Gastrointestinal:  Positive for constipation.   Genitourinary: Negative.    Musculoskeletal: Negative.    Skin: Negative.    Neurological: Negative.    Psychiatric/Behavioral: Negative.       Objective:     Vital Signs (Most Recent):  Temp: 97.8 °F (36.6 °C) (06/20/25 1134)  Pulse: 71 (06/20/25  1209)  Resp: 16 (06/20/25 1134)  BP: 104/64 (06/20/25 1134)  SpO2: (!) 90 % (06/20/25 1134) Vital Signs (24h Range):  Temp:  [97.4 °F (36.3 °C)-97.9 °F (36.6 °C)] 97.8 °F (36.6 °C)  Pulse:  [71-78] 71  Resp:  [16-18] 16  SpO2:  [90 %-99 %] 90 %  BP: ()/(51-78) 104/64     Weight: 81.6 kg (179 lb 14.3 oz)  Body mass index is 25.81 kg/m².    Intake/Output Summary (Last 24 hours) at 6/20/2025 1534  Last data filed at 6/20/2025 0543  Gross per 24 hour   Intake --   Output 0 ml   Net 0 ml         Physical Exam  Constitutional:       Appearance: He is ill-appearing.   HENT:      Head: Normocephalic and atraumatic.   Cardiovascular:      Rate and Rhythm: Normal rate.   Pulmonary:      Effort: Pulmonary effort is normal.      Breath sounds: Normal breath sounds.   Skin:     General: Skin is warm.   Neurological:      General: No focal deficit present.      Mental Status: He is alert. Mental status is at baseline.   Psychiatric:         Mood and Affect: Mood normal.         Behavior: Behavior normal.               Significant Labs: All pertinent labs within the past 24 hours have been reviewed.    Significant Imaging: I have reviewed all pertinent imaging results/findings within the past 24 hours.      Assessment & Plan  Cardiac arrest  Patient had cardiac arrest requiring ACLS x3 on 6/9  Remarkable improvement  - extubated on 06/11.  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 (Resolved: 6/20/2025)  -improving    NSTEMI (non-ST elevated myocardial infarction)  History of MI (myocardial infarction)  Patient with chest pain worsened while on HD  Trops are flat 0.14  EKG with no ischemic changes  Echo with worsening wall motion as per cardio  Seen by cardiology, plan was for Left heart cath 06/09 but patient had a cardiac arrest that morning; managing medically for now  - continue ASA/plavix/statin  - Cardiology following        Patient with chest pain worsened while on HD  Trops  are flat 0.14  EKG with no ischemic changes  Echo with worsening wall motion as per cardio  Seen by cardiology, plan was for Left heart cath 06/09 but patient had a cardiac arrest that morning; managing medically for now  - continue ASA/plavix/statin  - Cardiology following          HFrEF (heart failure with reduced ejection fraction)  - likely ischemic  - volume control with dialysis    Repeat echo showed   Left Ventricle: The left ventricle is moderately dilated. Mildly increased wall thickness. There is eccentric hypertrophy. Regional wall motion abnormalities present. See diagram for wall motion findings. There is moderately reduced systolic function with a visually estimated ejection fraction of 35 - 40%. Quantitated ejection fraction is 38%. Unable to assess diastolic function due to poor image quality.    Right Ventricle: The right ventricle is normal in size Systolic function is normal. TAPSE is 2.3 cm.    Overall the study quality was technically difficult.  Paroxysmal atrial fibrillation  Patient has long standing persistent (>12 months) atrial fibrillation. Patient is currently in sinus rhythm. [id number] Score: 1. The patients heart rate in the last 24 hours is as follows:  Pulse  Min: 71  Max: 78     Antiarrhythmics  metoprolol succinate (TOPROL-XL) 24 hr tablet 100 mg, 2 times daily, Oral  amiodarone tablet 400 mg, 2 times daily, Oral  amiodarone tablet 200 mg, Daily, Oral    Anticoagulants       Plan  - Replete lytes with a goal of K>4, Mg >2  - Patient is anticoagulated, see medications listed above.  - Patient's afib is currently controlled  -AC was held for high bleeding risk and low H&H    Acute respiratory failure with hypoxia (Resolved: 6/20/2025)  Resolved, currently on room air  ESRD (end stage renal disease)   >>ASSESSMENT AND PLAN FOR ESRD (END STAGE RENAL DISEASE) WRITTEN ON 6/7/2025  5:21 PM BY ERICH FRAZIER MD    Creatine stable for now. BMP reviewed- noted Estimated Creatinine  Clearance: 10.1 mL/min (A) (based on SCr of 7.6 mg/dL (H)). according to latest data. Based on current GFR, CKD stage is end stage.  Monitor UOP and serial BMP and adjust therapy as needed. Renally dose meds. Avoid nephrotoxic medications and procedures.    ESRD On HD,  Primary hypertension  Patient's blood pressure range in the last 24 hours was: BP  Min: 87/57  Max: 113/78.The patient's inpatient anti-hypertensive regimen is listed below:  Current Antihypertensives  metoprolol succinate (TOPROL-XL) 24 hr tablet 100 mg, 2 times daily, Oral    Plan  - BP is controlled, no changes needed to their regimen  - holding antihypertensives in setting of arrest/shock    Hyperlipidemia  -Continue statin  Anemia due to chronic kidney disease, on chronic dialysis  Anemia is likely due to chronic disease due to ESRD. Most recent hemoglobin and hematocrit are listed below.  Recent Labs     06/18/25  0404 06/19/25  0421 06/20/25  0637   HGB 7.9* 7.3* 8.6*   HCT 24.5* 22.7* 26.3*     Plan  - Monitor serial CBC: Daily  - Transfuse PRBC if patient becomes hemodynamically unstable, symptomatic or H/H drops below 7/21.  - Patient has received 1 units of PRBCs on 06/19.  - Patient's anemia is currently stable    Hypothyroidism  Cont levothyroxine     Atherosclerosis of native coronary artery of native heart with unstable angina pectoris  Patient with known CAD s/p stent placement, which is controlled Will continue ASA, Plavix, and Statin and monitor for S/Sx of angina/ACS. Continue to monitor on telemetry.   Prolonged Q-T interval on ECG  -continue to monitor    AV fistula thrombosis  Patient was not able to get dialysis today because of inability to access his AV fistula  IR contacted, for declot, but patient was not NPO, plan to start heparin drip and keep him NPO tonight for plan of possible declot tomorrow  Anesthesia consulted for temp line placement for dialysis today given hyperkalemia      VTE Risk Mitigation (From admission,  onward)           Ordered     IP VTE HIGH RISK PATIENT  Once         06/07/25 1155     Place sequential compression device  Until discontinued         06/07/25 1155                    Discharge Planning   FLACO: 6/21/2025     Code Status: Full Code   Medical Readiness for Discharge Date: 6/20/2025  Discharge Plan A: Rehab ([facility])   Discharge Delays:  (pending medical stability)      SDOH Screening:  The patient was screened for food insecurity, housing instability, transportation needs, utility difficulties, and interpersonal safety. The social determinant(s) of health identified as a concern this admission are:  Housing instability    Will discuss with case management and/or community health workers.    Social Drivers of Health with Concerns     Food Insecurity: No Food Insecurity (6/7/2025)   Recent Concern: Food Insecurity - Food Insecurity Present (5/31/2025)   Housing Stability: High Risk (6/7/2025)                   Please place Justification for DME      Nima Ervin MD  Department of Hospital Medicine   [facility]

## 2025-06-20 NOTE — ASSESSMENT & PLAN NOTE
Patient has long standing persistent (>12 months) atrial fibrillation. Patient is currently in sinus rhythm. [id number] Score: 1. The patients heart rate in the last 24 hours is as follows:  Pulse  Min: 71  Max: 78     Antiarrhythmics  metoprolol succinate (TOPROL-XL) 24 hr tablet 100 mg, 2 times daily, Oral  amiodarone tablet 400 mg, 2 times daily, Oral  amiodarone tablet 200 mg, Daily, Oral    Anticoagulants       Plan  - Replete lytes with a goal of K>4, Mg >2  - Patient is anticoagulated, see medications listed above.  - Patient's afib is currently controlled  -AC was held for high bleeding risk and low H&H

## 2025-06-20 NOTE — PROGRESS NOTES
Nephrology Consult   Note     Consult Requested By: Nima Ervin MD  Reason for Consult: ESRD     SUBJECTIVE:     ?    Review of Systems   Constitutional:  Negative for chills and fever.   Respiratory:  Negative for cough and shortness of breath.    Cardiovascular:  Negative for chest pain and leg swelling.   Gastrointestinal:  Negative for nausea.               OBJECTIVE:     Vital Signs (Most Recent)  Vitals:    06/20/25 0504 06/20/25 0737 06/20/25 0802 06/20/25 1134   BP: 113/78 111/76  104/64   BP Location:    Right arm   Patient Position:    Lying   Pulse: 77 76 74 77   Resp: 18 18  16   Temp: 97.9 °F (36.6 °C) 97.8 °F (36.6 °C)  97.8 °F (36.6 °C)   TempSrc: Oral Oral  Oral   SpO2: 96% 99% 98% (!) 90%   Weight:       Height:                           Medications:   [START ON 6/27/2025] amiodarone  200 mg Oral Daily    amiodarone  400 mg Oral BID    aspirin  81 mg Oral Daily    atorvastatin  40 mg Oral QHS    clopidogreL  75 mg Oral Daily    folic acid  1 mg Oral Daily    glycerin adult  1 suppository Rectal Once    levothyroxine  75 mcg Oral Before breakfast    LIDOcaine  1 patch Transdermal Q24H    metoprolol succinate  100 mg Oral BID    perflutren protein-a microsphr  0.5 mL Intravenous Once    polyethylene glycol  17 g Oral Daily    sevelamer carbonate  1,600 mg Oral TID WM    sodium zirconium cyclosilicate  10 g Oral Once             Physical Exam  Vitals and nursing note reviewed.   Constitutional:       General: He is not in acute distress.     Appearance: He is not diaphoretic.   HENT:      Head: Normocephalic and atraumatic.      Mouth/Throat:      Pharynx: No oropharyngeal exudate.   Eyes:      General: No scleral icterus.     Conjunctiva/sclera: Conjunctivae normal.      Pupils: Pupils are equal, round, and reactive to light.   Cardiovascular:      Rate and Rhythm: Normal rate and regular rhythm.      Heart sounds: Normal heart sounds. No murmur heard.  Pulmonary:      Effort: Pulmonary  effort is normal. No respiratory distress.      Breath sounds: No rales.   Abdominal:      General: Bowel sounds are normal. There is no distension.      Palpations: Abdomen is soft.      Tenderness: There is no abdominal tenderness.   Musculoskeletal:         General: Normal range of motion.      Cervical back: Normal range of motion and neck supple.      Right lower leg: No edema.      Left lower leg: No edema.      Comments: PEE ANN    Skin:     General: Skin is warm and dry.      Findings: No erythema.   Neurological:      Mental Status: He is alert and oriented to person, place, and time.      Cranial Nerves: No cranial nerve deficit.      Comments:     Psychiatric:         Mood and Affect: Affect normal.         Cognition and Memory: Memory is impaired.         Judgment: Judgment normal.         Laboratory:  Recent Labs   Lab 06/18/25  0404 06/19/25  0421 06/20/25  0637   WBC 9.90 11.37 10.52   HGB 7.9* 7.3* 8.6*   HCT 24.5* 22.7* 26.3*    195 204   MONO 8.5  0.84 9.4  1.07* 10.6  1.11*     Recent Labs   Lab 06/16/25  0353 06/16/25  1605 06/17/25  0401 06/18/25  0404 06/19/25  0525 06/20/25  0908   *   < > 132* 133* 129* 130*   K 3.4*   < > 4.5 5.0 5.4* 6.1*   CL 99   < > 98 101 98 100   CO2 19*   < > 17* 19* 16* 18*   BUN 71*   < > 89* 69* 96* 84*   CREATININE 5.5*   < > 7.4* 6.3* 8.2* 7.6*   CALCIUM 8.4*   < > 8.5* 8.5* 8.7 8.4*   PHOS 4.8*  --  5.6* 4.7*  --   --     < > = values in this interval not displayed.       Diagnostic Results:  X-Ray: Reviewed  US: Reviewed  Echo: Reviewed    Left Ventricle: The left ventricle is mildly dilated. There is eccentric hypertrophy. Regional wall motion abnormalities present. See diagram for wall motion findings. There is mildly reduced systolic function with a visually estimated ejection fraction of 45 - 50%. There is indeterminate diastolic function.Elevated left ventricular filling pressure.    Right Ventricle: Normal right ventricular cavity size.  Wall thickness is normal. Systolic function is normal.    Left Atrium: Left atrium is mildly dilated.    Right Atrium: Right atrium is mildly dilated.    Mitral Valve: There is mild to moderate regurgitation.    Pulmonic Valve: There is moderate regurgitation.    Pulmonary Artery: There is pulmonary hypertension. The estimated pulmonary artery systolic pressure is 47 mmHg.    IVC/SVC: Intermediate venous pressure at 8 mmHg.  ASSESSMENT/PLAN:    Patient  sent from his regular dialysis after completing his treatment develop chest pain  a cardiac arrest while on the floor.  ESRD on HD TTS with Dr Loly Payne  in [facility]      Status post cardiac arrest.  Doing excellent considering what he went through.    Did not dialyze on Tuesday were too unstable.   Tolerated dialysis well on Wednesday Thursday  and Saturday   Resume his regular Tuesday Thursday Saturday schedule, plan for dialysis Tuesday    Patient with a history of persistent hypokalemia.  But when his cardiac arrest has been his potassium was 3.9.  Both days on Wednesday and yesterday patient ran on 4K bath  Potassium today 4.4.  Magnesium is 2.1.   Episodes of AFib with RVR .  Unlikely to be related to electrolyte disturbances but replace K+ close to 4    HD TTS  pending  discharge to Rehab transition to MWF     Had Hypokalemia   -- Replace K+ to 4.0   Now K+ 6.0   -- plan for HD today and transition to MWF for now       2. HTN    was hypotensive after cardiac arrest, all of the blood pressure medications on hold.  Levophed was weaned  off     3. 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  Hold Epogen in the settings of the cardiac arrest  with underlying CAD  Recent Labs   Lab 06/18/25  0404 06/19/25  0421 06/20/25  0637   HGB 7.9* 7.3* 8.6*   HCT 24.5* 22.7* 26.3*    195 204       Iron   Lab Results   Component Value Date    IRON 63 06/09/2025    TIBC 232 (L) 06/09/2025    FERRITIN 2,942.0 (H) 06/11/2025        4. MBD - PTH at goal     Sevelamer with each meal- Resume  Ergocalciferol 60835 units weekly    Recent Labs   Lab 06/16/25  1605 06/17/25  0401   MG 2.3 2.2       Lab Results   Component Value Date    .8 (H) 06/04/2025    CALCIUM 8.4 (L) 06/20/2025    CAION 1.31 07/14/2020    PHOS 4.7 (H) 06/18/2025     Lab Results   Component Value Date    ZJHCTNXT25WN 9 (L) 03/14/2024     Acidosis - correct with HD   Lab Results   Component Value Date    CO2 18 (L) 06/20/2025     Hemodialysis access-left upper arm loop AV graft.     Nutrition/Hypoalbuminemia    Recent Labs   Lab 06/17/25  0401 06/18/25  0404   ALBUMIN 2.1* 2.2*     Nepro with meals TID.   -- poor appetite       Thank you for the consult, will follow  With any question please call [phone]  Nena Arriola MD    [facility]    ESPERANZA Payne MD,   MD BRANDY Arredondo MD E. V. Harmon, NP    [address]  [place], [address] [phone]

## 2025-06-20 NOTE — ASSESSMENT & PLAN NOTE
Patient was not able to get dialysis today because of inability to access his AV fistula  IR contacted, for declot, but patient was not NPO, plan to start heparin drip and keep him NPO tonight for plan of possible declot tomorrow  Anesthesia consulted for temp line placement for dialysis today given hyperkalemia

## 2025-06-20 NOTE — SUBJECTIVE & OBJECTIVE
Interval History:   I have Seen and examined the patient today  Patient feels okay, picking on his breakfast this morning    He went for dialysis, his AV fistula is clotted, IR was consulted, but patient was not NPO so patient could not have the declot procedure today, so I contacted anesthesia for temporarily line placement for dialysis since patient has hyperkalemia and he will need dialysis today.    Review of Systems   Constitutional: Negative.    HENT: Negative.     Respiratory: Negative.     Cardiovascular: Negative.    Gastrointestinal:  Positive for constipation.   Genitourinary: Negative.    Musculoskeletal: Negative.    Skin: Negative.    Neurological: Negative.    Psychiatric/Behavioral: Negative.       Objective:     Vital Signs (Most Recent):  Temp: 97.8 °F (36.6 °C) (06/20/25 1134)  Pulse: 71 (06/20/25 1209)  Resp: 16 (06/20/25 1134)  BP: 104/64 (06/20/25 1134)  SpO2: (!) 90 % (06/20/25 1134) Vital Signs (24h Range):  Temp:  [97.4 °F (36.3 °C)-97.9 °F (36.6 °C)] 97.8 °F (36.6 °C)  Pulse:  [71-78] 71  Resp:  [16-18] 16  SpO2:  [90 %-99 %] 90 %  BP: ()/(51-78) 104/64     Weight: 81.6 kg (179 lb 14.3 oz)  Body mass index is 25.81 kg/m².    Intake/Output Summary (Last 24 hours) at 6/20/2025 1534  Last data filed at 6/20/2025 0543  Gross per 24 hour   Intake --   Output 0 ml   Net 0 ml         Physical Exam  Constitutional:       Appearance: He is ill-appearing.   HENT:      Head: Normocephalic and atraumatic.   Cardiovascular:      Rate and Rhythm: Normal rate.   Pulmonary:      Effort: Pulmonary effort is normal.      Breath sounds: Normal breath sounds.   Skin:     General: Skin is warm.   Neurological:      General: No focal deficit present.      Mental Status: He is alert. Mental status is at baseline.   Psychiatric:         Mood and Affect: Mood normal.         Behavior: Behavior normal.               Significant Labs: All pertinent labs within the past 24 hours have been  reviewed.    Significant Imaging: I have reviewed all pertinent imaging results/findings within the past 24 hours.

## 2025-06-20 NOTE — NURSING
Patient went straight to dialysis from having central line placed. To start heparin drip after dialysis. Went to dialysis at approx 1625.

## 2025-06-20 NOTE — PLAN OF CARE
"0830  Message sent to Moriah w/[facility] questioning ins auth status. Awaiting response.        06/20/25 0935   Rounds   Attendance Provider;Nurse    Discharge Plan A Rehab  ([facility])   Transition of Care Barriers Transportation     0935  Patient resting quietly in bed when JUAREZ participated in SIBR with Dr Ervin. No family present.       Pt was admitted with cardiac arrest, stepdown from ICU on 6/18/2025, & continues to be followed by cards, neph, & PT/OT/SLP.     CM informed the pt that he is medically stable to dc to [facility] today after he has a BM & ins auth is obtained. Pt verbalized understanding & agreement.     0945  CM was informed by Moriah that ins auth has not been obtained yet, that [facility] provides HD sessions (MWF) instead of the pt's (TTS) schedule, & that the pt will need to have his HD session on Saturday prior to discharge if ins auth is obtained today. CM informed Dr Ervin of above.     1200  CM questioned [facility] questioning ins auth status. Ins auth still pending.     1440  CM questioned [facility] questioning ins auth status. Awaiting response.     1450  Message sent to nurse Maciel questioning pt's BM status. Awaiting response.     1500  CM was informed by Moriah that ins auth has been obtained. Per documentation, "Pt's LUE AVG with no thrill and bruit, Dr Arriola informed and ordered an IR consult for fistulogram".    1525  CM informed the pt's spouse, Karmen Gross ([phone]), via phone of the pt's discharge status. Spouse verbalized understanding and agreement & stated she  will be at the hospital this afternoon.     1555  CM was informed by nurse Maciel that the pt has not have a BM & that he refused the suppository. Pt off the unit when JUAREZ rounded via [employer] to discuss above.       Will continue to follow.   "

## 2025-06-20 NOTE — ASSESSMENT & PLAN NOTE
>>ASSESSMENT AND PLAN FOR ESRD (END STAGE RENAL DISEASE) WRITTEN ON 6/7/2025  5:21 PM BY ERICH FRAZIER MD    Creatine stable for now. BMP reviewed- noted Estimated Creatinine Clearance: 10.1 mL/min (A) (based on SCr of 7.6 mg/dL (H)). according to latest data. Based on current GFR, CKD stage is end stage.  Monitor UOP and serial BMP and adjust therapy as needed. Renally dose meds. Avoid nephrotoxic medications and procedures.    ESRD On HD,

## 2025-06-20 NOTE — ASSESSMENT & PLAN NOTE
Patient's blood pressure range in the last 24 hours was: BP  Min: 87/57  Max: 113/78.The patient's inpatient anti-hypertensive regimen is listed below:  Current Antihypertensives  metoprolol succinate (TOPROL-XL) 24 hr tablet 100 mg, 2 times daily, Oral    Plan  - BP is controlled, no changes needed to their regimen  - holding antihypertensives in setting of arrest/shock

## 2025-06-20 NOTE — PT/OT/SLP PROGRESS
Occupational Therapy  Visit Attempt    Patient Name:  Domingo Gross   MRN:  [id number]    Patient not seen today secondary to Dialysis.     6/20/2025

## 2025-06-20 NOTE — PT/OT/SLP PROGRESS
Physical Therapy      Patient Name:  Domingo Gross   MRN:  [id number]    Patient not seen today secondary to Dialysis ([id number]). Will follow-up .

## 2025-06-20 NOTE — ASSESSMENT & PLAN NOTE
Anemia is likely due to chronic disease due to ESRD. Most recent hemoglobin and hematocrit are listed below.  Recent Labs     06/18/25  0404 06/19/25  0421 06/20/25  0637   HGB 7.9* 7.3* 8.6*   HCT 24.5* 22.7* 26.3*     Plan  - Monitor serial CBC: Daily  - Transfuse PRBC if patient becomes hemodynamically unstable, symptomatic or H/H drops below 7/21.  - Patient has received 1 units of PRBCs on 06/19.  - Patient's anemia is currently stable

## 2025-06-20 NOTE — ANESTHESIA PROCEDURE NOTES
Central Line    Diagnosis: ESRD  Patient location during procedure: PACU  Procedure Urgency: Routine  Procedure start time: 6/20/2025 3:45 PM  Timeout: 6/20/2025 3:39 PM  Procedure end time: 6/20/2025 3:55 PM      Staffing  Authorizing Provider: Chris Beck MD  Performing Provider: Chris Beck MD    Staffing  Performed by: Chris Beck MD  Authorized by: Chris Beck MD    Anesthesiologist was present at the time of the procedure.  Preanesthetic Checklist  Completed: patient identified, IV checked, site marked, risks and benefits discussed, surgical consent, monitors and equipment checked, pre-op evaluation, timeout performed and anesthesia consent given  Indication   Indication: hemodialysis     Anesthesia   local infiltration    Central Line   Skin Prep: skin prepped with ChloraPrep, skin prep agent completely dried prior to procedure  Sterile Barriers Followed: Yes    All five maximal barriers used- gloves, gown, cap, mask, and large sterile sheet    hand hygiene performed prior to central venous catheter insertion  Location: right internal jugular.   Catheter type: Dialysis  Catheter Size: 14 Fr (13F)  Inserted Catheter Length: 15 cm  Ultrasound: vascular probe with ultrasound   Vessel Caliber: large, patent  Vascular Doppler:  not done, compressibility normal  Needle advanced into vessel with real time Ultrasound guidance.  Guidewire confirmed in vessel.  sterile gel and probe cover used in ultrasound-guided central venous catheter insertion   Manometry: Venous cannualation confirmed by visual estimation of blood vessel pressure using manometry.  Insertion Attempts: 1   Securement:line sutured, chlorhexidine patch, sterile dressing applied and blood return through all ports    Post-Procedure        Guidewire Guidewire removed intact. Guidewire removed intact, verified with nurse.

## 2025-06-20 NOTE — PT/OT/SLP PROGRESS
"Speech Language Pathology Treatment    Patient Name:  Domingo Gross   MRN:  [id number]  Admitting Diagnosis: Cardiac arrest    Recommendations:                 General Recommendations:  Speech/language therapy and Cognitive-linguistic therapy  Diet recommendations:  Regular Diet - IDDSI Level 7, Liquid Diet Level: Thin liquids - IDDSI Level 0   Aspiration Precautions: 1 bite/sip at a time, Alternating bites/sips, Assistance with meals, Feed only when awake/alert, HOB to 90 degrees, Remain upright 30 minutes post meal, and Small bites/sips   General Precautions: Standard, fall  Communication strategies:  Speak loud, over articulate  Discharge recommendations:  High Intensity Therapy   Barriers to Discharge:  None    Assessment:     Domingo Gross is a 65 y.o. male with an SLP diagnosis of cognitive deficits s/p CVA.     Subjective     Pt in bed awake and alert upon entry. Pt reported, "when can I go home?"  Patient goals: "I live with my wife and grandson"     Pain/Comfort:  Pain Rating 1: 0/10    Respiratory Status: Room air    Objective:     Has the patient been evaluated by SLP for swallowing?   Yes  Keep patient NPO? No   Current Respiratory Status:        Cognition/Communication: Pt in bed awake and alert. Pt agreeable to participate in session with st this date. During session, St noticed pt easily distracted and required frequent repetitions of tasks as well as redirections. Pt with reduced vocal intensity throughout session and pt with reduced speech intelligibility (~70% intelligible) 2/2 reduced movement of the articulators    The SLUMS ([employer] Mental Status) Exam is a 30-point, 11 question screening questionnaire that tests orientation, memory, attention, and executive function, with items such as animal naming, digit span, figure recognition, clock drawing and size differentiation.    UMS Scoring:   Questions 1-3 (Attention, immediate recall, and Orientation) : 3/3 - Pt oriented to " CECILIA, year, and state  Questions 4 and 7 (Delayed Recall with interference): 2/5- Pt able to recall x2 words after given an semantic cue and a binary choice  Question 5 (Numerica Calculation and Registration): 1/3 -Pt able to complete simple addition questions, however, pt with difficulty completing simple subtraction task.   Question 6 (Immediate recall/Divergent naming with time constraint): 2/3 - Pt able to name 10 animals indly in 1 minute  Question 8 (Registration and Digit Span): 1/2 - Pt able to recall 3 digit sequence backwards, however, pt with difficulty completing task with 4 digit sequence  Question 9 (Visual Spatial and Executive Funtioning): 0/4 - Pt seen to place all numbers on L side of clock with inadequate spacing. Pt incorrectly placed the time when asked.   Question 10 (Visual Spatial and Executive Functioining): 2/2- Pt able to identify the triangle shape as well as the largest shape  Question 11 (Executive Functioning, Attention and recall): 0/8 - Pt with reduced attention and requires st to repeat short story x2. Pt still unable to adequately answer x4/4 questions despite questions being asking in binary choice format.     Total: 11/30 -- pt with 8th grade education. Pt presents with cognitive-linguistic deficits c/b reduced attention, memory, executive functioning, and visual spatial. Pt with delayed processing and initiation throughout session and pt with reduced insight into deficits and safety awareness.     Interpretation   High school education  Less than high school education   Normal 27-30 25-30   Mild Neurocognitive Disorder  21-26 20-24   Dementia (or other cognitive disorder) 1-20 1-19     Of note, this tool is NOT being utilized to diagnose Dementia. This tool is serving as an informal assessment to determine if cognitive deficits are present in the acute care setting, and if this patient would benefit from a more formal cognitive assessment.        Goals:   Multidisciplinary  Problems       SLP Goals          Problem: SLP    Goal Priority Disciplines Outcome   SLP Goal     SLP Progressing   Description: Updated Short Term Goals:  1. Ongoing swallow eval to determine safest diet level. - MET 6/14  2. Pt tolerate FULL liquids with no audible dysphagia signs.  MET 6/14  3. Pt will participate in informal cognitive/speech/lang eval- MET 6/16  4. Pt will consume minced/moist diet and thin liquids with no audible dysphagia signs- MET 6/16  5. Pt will state basic orientation with min cues. - MET 6/16  6. Pt will answer general questions with audible yes/no response ongoing   7. Pt will attend to structured tasks with mod cues. - ongoing   8. Pt will utilize speech strategies to be understood by staff including: making eye contact, speak loudly, clearly and over-articulate with mod cues.                        Plan:     Patient to be seen:  2 x/week   Plan of Care expires:  07/20/25  Plan of Care reviewed with:  patient   SLP Follow-Up:  Yes       Time Tracking:     SLP Treatment Date:   06/20/25  Speech Start Time:  0900  Speech Stop Time:  0933     Speech Total Time (min):  33 min    Billable Minutes: Speech Therapy Individual 33    06/20/2025

## 2025-06-20 NOTE — PROGRESS NOTES
[facility] - Telemetry  Adult Nutrition  Progress Note    SUMMARY       Recommendations    Recommendation:  1. Encourage intake of meals & supplements as tolerated.  2. Monitor weight/labs.   3. RD to continue to follow to monitor po intake    Goals:   Pt will tolerate diet with at least 50-75% intake at meals by RD follow up  Nutrition Goal Status: progressing towards goal  Communication of RD Recs: reviewed with RN (Randy)    Nutrition Discharge Planning  Nutrition Discharge Planning: Therapeutic diet (comments)  Therapeutic diet (comments): Renal    Assessment and Plan    Malnutrition Assessment  Orbital Region (Subcutaneous Fat Loss): well nourished  Upper Arm Region (Subcutaneous Fat Loss): well nourished  Thoracic and Lumbar Region: well nourished   Baptism Region (Muscle Loss): well nourished  Clavicle Bone Region (Muscle Loss): well nourished  Clavicle and Acromion Bone Region (Muscle Loss): well nourished  Scapular Bone Region (Muscle Loss): well nourished  Dorsal Hand (Muscle Loss): well nourished  Patellar Region (Muscle Loss): well nourished  Anterior Thigh Region (Muscle Loss): well nourished  Posterior Calf Region (Muscle Loss): well nourished       Reason for Assessment  Reason For Assessment: RD follow-up  Diagnosis:  (cardiac arrest, NSTEMI)  General Information Comments: Pt admitted with chest pain. s/p code blue with intubation 6/9. Extubated 6/11. Pt continues on Regular Renal HD diet with Novasource Renal. Noted poorintake at meals.Receives HD. Brian 14-skin intact. No recent weight loss noted. Brian 14-skin intact. NFPE completed 6/17-nourished. Pt was off the unit to HD at visit.    Past Medical History:   Diagnosis Date    Acute congestive heart failure 09/13/2024    Allergy     Anemia     Anticoagulant long-term use     Arthritis     CKD (chronic kidney disease) stage 4, GFR 15-29 ml/min     Closed fracture of transverse process of lumbar vertebra 02/16/2024    COPD (chronic obstructive  "pulmonary disease) 2021    Coronary artery disease     Heart attack 10/04/2019    Hematuria     Hemothorax     Hyperlipidemia     Hypertension     Hyperuricemia     Hypocalcemia     Hypokalemia     Hypophosphatemia     Hypothyroidism 2023    PAD (peripheral artery disease)     Proteinuria     Vitamin D deficiency         Nutrition/Diet History  Food Preferences: no Yarsani or cultural food prefs identified  Spiritual, Cultural Beliefs, Rastafari Practices, Values that Affect Care: no  Food Allergies: NKFA  Factors Affecting Nutritional Intake: decreased appetite (s/p extubation)    Anthropometrics  Height: 5' 10" (177.8 cm)  Height (inches): 70 in  Height Method: Stated  Weight: 81.6 kg (179 lb 14.3 oz)  Weight (lb): 179.9 lb  Weight Method: Standard Scale  Ideal Body Weight (IBW), Male: 166 lb  % Ideal Body Weight, Male (lb): 108.37 %  BMI (Calculated): 25.8  BMI Grade: 25 - 29.9 - overweight  Usual Body Weight (UBW), k.2 kg ()  % Usual Body Weight: 100.7  % Weight Change From Usual Weight: 0.49 %     Lab/Procedures/Meds  Pertinent Labs Reviewed: reviewed  Pertinent Labs Comments: Na 129L, K 5.4H, BUN 96H, Crea 8.2H  Pertinent Medications Reviewed: reviewed  Pertinent Medications Comments: amiodarone, aspirin, folic acid    Estimated/Assessed Needs  Weight Used For Calorie Calculations: 81.6 kg (179 lb 14.3 oz)  Energy Calorie Requirements (kcal): 2284 (28 kcal/kg)  Energy Need Method: Kcal/kg  Protein Requirements: 81-97g (1.0-1.2g/kg)  Weight Used For Protein Calculations: 81.6 kg (179 lb 14.3 oz)  Fluid Requirements (mL): 1500+output or per md order  Estimated Fluid Requirement Method: RDA Method  RDA Method (mL): 2284  CHO Requirement: 250    Nutrition Prescription Ordered  Current Diet Order: Regular Renal HD  Oral Nutrition Supplement: Novasource Renal    Evaluation of Received Nutrient/Fluid Intake  I/O: 500/1189  Energy Calories Required: not meeting needs  Protein Required: not " meeting needs  Fluid Required: not meeting needs  Comments: LBM 6/13  % Intake of Estimated Energy Needs: 0 - 25 %  % Meal Intake: 0 - 25 %    PES Statement  Inadequate protein energy intake related to Trauma (s/p code blue with intubation) as evidenced by Intake <50% estimated needs (poor appetite s/p extubation)  Status: Continues    Nutrition Risk  Level of Risk/Frequency of Follow-up: moderate - high (Follow up: 2x per week)     Monitor and Evaluation  Monitor and Evaluation: Food and beverage intake, Weight, Electrolyte and renal panel, Gastrointestinal profile     Nutrition Related Social Determinants of Health: SDOH: Adequate food in home environment     Nutrition Follow-Up  RD Follow-up?: Yes

## 2025-06-20 NOTE — CONSULTS
Radiology Consult    Domingo Gross is a 65 y.o. male with a history of ESRD on HD via AVF, patient presented to dialysis today and AVF found to be thrombosed. IR consulted for declot. Patient last ate at 10 today.  Past Medical History:   Diagnosis Date    Acute congestive heart failure 09/13/2024    Allergy     Anemia     Anticoagulant long-term use     Arthritis     CKD (chronic kidney disease) stage 4, GFR 15-29 ml/min     Closed fracture of transverse process of lumbar vertebra 02/16/2024    COPD (chronic obstructive pulmonary disease) 08/20/2021    Coronary artery disease     Heart attack 10/04/2019    Hematuria     Hemothorax     Hyperlipidemia     Hypertension     Hyperuricemia     Hypocalcemia     Hypokalemia     Hypophosphatemia     Hypothyroidism 03/02/2023    PAD (peripheral artery disease)     Proteinuria     Vitamin D deficiency      Past Surgical History:   Procedure Laterality Date    ABDOMINAL AORTOGRAPHY N/A 5/10/2019    Procedure: AORTOGRAM-ABDOMINAL;  Surgeon: Keo Jenkins MD;  Location: [facility] CATH LAB/EP;  Service: Cardiology;  Laterality: N/A;  RSFA intervention     ANGIOGRAM, CORONARY, WITH LEFT HEART CATHETERIZATION N/A 1/24/2025    Procedure: Angiogram, Coronary, with Left Heart Cath;  Surgeon: Valente Moran MD;  Location: [facility] CATH LAB/EP;  Service: Cardiology;  Laterality: N/A;    AORTOGRAPHY WITH SERIALOGRAPHY N/A 12/3/2021    Procedure: AORTOGRAM, WITH SERIALOGRAPHY;  Surgeon: Keo Jenkins MD;  Location: [facility] CATH LAB/EP;  Service: Cardiology;  Laterality: N/A;    AORTOGRAPHY WITH SERIALOGRAPHY N/A 4/1/2022    Procedure: AORTOGRAM, WITH SERIALOGRAPHY;  Surgeon: Keo Jenkins MD;  Location: [facility] CATH LAB/EP;  Service: Cardiology;  Laterality: N/A;    ATHERECTOMY, CORONARY N/A 4/25/2023    Procedure: Atherectomy-coronary;  Surgeon: Koe Jenkins MD;  Location: [facility] CATH LAB/EP;  Service: Cardiology;  Laterality: N/A;    ATHERECTOMY, CORONARY N/A 1/24/2025    Procedure:  Atherectomy-coronary;  Surgeon: Valente Moran MD;  Location: [facility] CATH LAB/EP;  Service: Cardiology;  Laterality: N/A;    BONE MARROW BIOPSY Right 10/12/2021    Procedure: BIOPSY-BONE MARROW;  Surgeon: Naseem Woods MD;  Location: [facility] OR;  Service: Oncology;  Laterality: Right;    CARDIAC CATHETERIZATION      CATARACT EXTRACTION      CATARACT EXTRACTION W/  INTRAOCULAR LENS IMPLANT Right 6/8/2020    Procedure: EXTRACTION, CATARACT, WITH IOL INSERTION;  Surgeon: Tin Light MD;  Location: [facility] OR;  Service: Ophthalmology;  Laterality: Right;    CATARACT EXTRACTION W/  INTRAOCULAR LENS IMPLANT Left 7/2/2020    Procedure: EXTRACTION, CATARACT, WITH IOL INSERTION;  Surgeon: Tin Light MD;  Location: [facility] OR;  Service: Ophthalmology;  Laterality: Left;    COLONOSCOPY N/A 1/6/2022    Procedure: COLONOSCOPY;  Surgeon: Kathe Penaloza MD;  [facility]  Service: Endoscopy;  Laterality: N/A;  COVID test at [facility] on 1/3-GT  okay to hold Plavix for 5 days and aspirin per Dr. Becerra  2nd floor due toextensive cardiac history   instructions mailed and [facility] -     CORONARY ANGIOGRAPHY N/A 3/29/2019    Procedure: ANGIOGRAM, CORONARY ARTERY;  Surgeon: Keo Jenkins MD;  Location: [facility] CATH LAB/EP;  Service: Cardiology;  Laterality: N/A;    CORONARY ANGIOGRAPHY Left 10/11/2019    Procedure: ANGIOGRAM, CORONARY ARTERY;  Surgeon: Keo Jenkins MD;  Location: [facility] CATH LAB/EP;  Service: Cardiology;  Laterality: Left;    CORONARY ANGIOGRAPHY N/A 4/10/2023    Procedure: ANGIOGRAM, CORONARY ARTERY;  Surgeon: Keo Jenkins MD;  Location: [facility] CATH LAB/EP;  Service: Cardiology;  Laterality: N/A;    CORONARY ANGIOGRAPHY N/A 6/26/2024    Procedure: ANGIOGRAM, CORONARY ARTERY;  Surgeon: Enoch Tirado MD;  Location: [facility] CATH LAB/EP;  Service: Cardiology;  Laterality: N/A;    CORONARY ANGIOGRAPHY N/A 9/16/2024    Procedure: ANGIOGRAM, CORONARY ARTERY;  Surgeon: Martin Bray  MD Enoch;  Location: [facility] CATH LAB/EP;  Service: Cardiology;  Laterality: N/A;    CORONARY ANGIOPLASTY WITH STENT PLACEMENT  03/29/2019    mid and distal RCA    CORONARY ANGIOPLASTY WITH STENT PLACEMENT N/A 1/24/2025    Procedure: ANGIOPLASTY, CORONARY ARTERY, WITH STENT INSERTION;  Surgeon: Valente Moran MD;  Location: [facility] CATH LAB/EP;  Service: Cardiology;  Laterality: N/A;    ESOPHAGOGASTRODUODENOSCOPY N/A 1/6/2022    Procedure: EGD (ESOPHAGOGASTRODUODENOSCOPY);  Surgeon: Kathe Penaloza MD;  [facility]  Service: Endoscopy;  Laterality: N/A;    EYE SURGERY      INSERTION OF TUNNELED CENTRAL VENOUS HEMODIALYSIS CATHETER N/A 2/9/2024    Procedure: INSERTION, CATHETER, HEMODIALYSIS, DUAL LUMEN;  Surgeon: Wang Mendieta MD;  Location: [facility] OR;  Service: General;  Laterality: N/A;    INSTANTANEOUS WAVE-FREE RATIO (IFR) N/A 4/25/2023    Procedure: Instantaneous Wave-Free Ratio (IFR);  Surgeon: Keo Jenkins MD;  Location: [facility] CATH LAB/EP;  Service: Cardiology;  Laterality: N/A;    INTRAVASCULAR ULTRASOUND, NON-CORONARY  8/12/2022    Procedure: Intravascular Ultrasound, Non-Coronary;  Surgeon: Keo Jenkins MD;  Location: [facility] CATH LAB/EP;  Service: Cardiology;;    IVUS, CORONARY  4/25/2023    Procedure: IVUS, Coronary;  Surgeon: Keo Jenkins MD;  Location: [facility] CATH LAB/EP;  Service: Cardiology;;    IVUS, CORONARY  5/16/2023    Procedure: IVUS, Coronary;  Surgeon: Keo Jenkins MD;  Location: [facility] CATH LAB/EP;  Service: Cardiology;;  CX    IVUS, CORONARY  1/24/2025    Procedure: IVUS, Coronary;  Surgeon: Valente Moran MD;  Location: [facility] CATH LAB/EP;  Service: Cardiology;;    LEFT HEART CATHETERIZATION N/A 3/29/2019    Procedure: Left heart cath;  Surgeon: Keo Jenkins MD;  Location: [facility] CATH LAB/EP;  Service: Cardiology;  Laterality: N/A;    LEFT HEART CATHETERIZATION Left 10/8/2019    Procedure: Left heart cath;  Surgeon: Keo Jenkins MD;  Location: [facility] CATH LAB/EP;   Service: Cardiology;  Laterality: Left;    LEFT HEART CATHETERIZATION Left 4/10/2023    Procedure: Left heart cath;  Surgeon: Keo Jenkins MD;  Location: [facility] CATH LAB/EP;  Service: Cardiology;  Laterality: Left;    LEFT HEART CATHETERIZATION Left 4/25/2023    Procedure: Left heart cath;  Surgeon: Keo Jenkins MD;  Location: [facility] CATH LAB/EP;  Service: Cardiology;  Laterality: Left;    LEFT HEART CATHETERIZATION Left 5/16/2023    Procedure: Left heart cath;  Surgeon: Keo Jenkins MD;  Location: [facility] CATH LAB/EP;  Service: Cardiology;  Laterality: Left;    LEFT HEART CATHETERIZATION Left 6/26/2024    Procedure: Left heart cath;  Surgeon: Enoch Tirado MD;  Location: [facility] CATH LAB/EP;  Service: Cardiology;  Laterality: Left;    LEFT HEART CATHETERIZATION Left 9/16/2024    Procedure: Left heart cath;  Surgeon: Enoch Tirado MD;  Location: [facility] CATH LAB/EP;  Service: Cardiology;  Laterality: Left;    LITHOTRIPSY, CORONARY TRANSLUMINAL, PERCUTANEOUS  9/16/2024    Procedure: LITHOTRIPSY, CORONARY TRANSLUMINAL, PERCUTANEOUS;  Surgeon: Enoch Tirado MD;  Location: [facility] CATH LAB/EP;  Service: Cardiology;;    PERCUTANEOUS CORONARY INTERVENTION, ARTERY N/A 6/26/2024    Procedure: Percutaneous coronary intervention;  Surgeon: Enoch Tirado MD;  Location: [facility] CATH LAB/EP;  Service: Cardiology;  Laterality: N/A;    PERCUTANEOUS TRANSLUMINAL ANGIOPLASTY (PTA) OF PERIPHERAL VESSEL N/A 7/12/2019    Procedure: PTA, PERIPHERAL VESSEL;  Surgeon: Keo Jenkins MD;  Location: [facility] CATH LAB/EP;  Service: Cardiology;  Laterality: N/A;    PERCUTANEOUS TRANSLUMINAL ANGIOPLASTY (PTA) OF PERIPHERAL VESSEL Left 5/20/2022    Procedure: PTA, PERIPHERAL VESSEL;  Surgeon: Keo Jenkins MD;  Location: [facility] CATH LAB/EP;  Service: Cardiology;  Laterality: Left;    PERCUTANEOUS TRANSLUMINAL ANGIOPLASTY (PTA) OF PERIPHERAL VESSEL Right 8/12/2022    Procedure: PTA, PERIPHERAL VESSEL;  Surgeon: Keo Jenkins  MD;  Location: [facility] CATH LAB/EP;  Service: Cardiology;  Laterality: Right;    PERCUTANEOUS TRANSLUMINAL BALLOON ANGIOPLASTY OF CORONARY ARTERY  4/25/2023    Procedure: Angioplasty-coronary;  Surgeon: Keo Jenkins MD;  Location: [facility] CATH LAB/EP;  Service: Cardiology;;    PLACEMENT OF ARTERIOVENOUS GRAFT Left 4/15/2024    Procedure: INSERTION, GRAFT, ARTERIOVENOUS;  Surgeon: Scott Pascual MD;  Location: [facility] OR;  Service: General;  Laterality: Left;    PTCA, SINGLE VESSEL  5/16/2023    Procedure: PTCA, Single Vessel;  Surgeon: Keo Jenkins MD;  Location: [facility] CATH LAB/EP;  Service: Cardiology;;  CX    PTCA, SINGLE VESSEL  6/26/2024    Procedure: PTCA, Single Vessel;  Surgeon: Enoch Tirado MD;  Location: [facility] CATH LAB/EP;  Service: Cardiology;;    PTCA, SINGLE VESSEL Left 9/16/2024    Procedure: PTCA, Single Vessel;  Surgeon: Enoch Tirado MD;  Location: [facility] CATH LAB/EP;  Service: Cardiology;  Laterality: Left;    REMOVAL OF HEMODIALYSIS CATHETER Right 2/9/2024    Procedure: REMOVAL, CATHETER, HEMODIALYSIS;  Surgeon: Wang Mendieta MD;  Location: [facility] OR;  Service: General;  Laterality: Right;    REMOVAL OF TUNNELED CENTRAL VENOUS CATHETER (CVC) Right 5/20/2024    Procedure: REMOVAL, CATHETER, CENTRAL VENOUS, TUNNELED;  Surgeon: Scott Pascual MD;  Location: [facility] OR;  Service: General;  Laterality: Right;    REPAIR, CHRONIC TOTAL OCCLUSION, CORONARY  6/26/2024    Procedure: Repair, Chronic Total Occlusion, Coronary;  Surgeon: Enoch Tirado MD;  Location: [facility] CATH LAB/EP;  Service: Cardiology;;    STENT, DRUG ELUTING, SINGLE VESSEL, CORONARY  4/25/2023    Procedure: Stent, Drug Eluting, Single Vessel, Coronary;  Surgeon: Keo Jenkins MD;  Location: [facility] CATH LAB/EP;  Service: Cardiology;;    STENT, DRUG ELUTING, SINGLE VESSEL, CORONARY  5/16/2023    Procedure: Stent, Drug Eluting, Single Vessel, Coronary;  Surgeon: Keo Jenkins MD;  Location: [facility] CATH LAB/EP;   Service: Cardiology;;  CX    TRANSESOPHAGEAL ECHOCARDIOGRAM WITH POSSIBLE CARDIOVERSION (JOSE W/ POSS CARDIOVERSION) N/A 6/19/2023    Procedure: Transesophageal echo (JOSE) intra-procedure log documentation;  Surgeon: Keo Jenkins MD;  Location: [facility] CATH LAB/EP;  Service: Cardiology;  Laterality: N/A;       Discussed with primary team, Dr. Ervin.        Scheduled Meds:    [START ON 6/27/2025] amiodarone  200 mg Oral Daily    amiodarone  400 mg Oral BID    aspirin  81 mg Oral Daily    atorvastatin  40 mg Oral QHS    clopidogreL  75 mg Oral Daily    folic acid  1 mg Oral Daily    glycerin adult  1 suppository Rectal Once    levothyroxine  75 mcg Oral Before breakfast    LIDOcaine  1 patch Transdermal Q24H    metoprolol succinate  100 mg Oral BID    perflutren protein-a microsphr  0.5 mL Intravenous Once    polyethylene glycol  17 g Oral Daily    sevelamer carbonate  1,600 mg Oral TID WM     Continuous Infusions:   PRN Meds:  Current Facility-Administered Medications:     0.9%  NaCl infusion (for blood administration), , Intravenous, Q24H PRN    0.9%  NaCl infusion (for blood administration), , Intravenous, Q24H PRN    0.9%  NaCl infusion (for blood administration), , Intravenous, Q24H PRN    albuterol sulfate, 2.5 mg, Nebulization, Q4H PRN    dextrose 50%, 12.5 g, Intravenous, PRN    dextrose 50%, 25 g, Intravenous, PRN    glucagon (human recombinant), 1 mg, Intramuscular, PRN    glucose, 16 g, Oral, PRN    glucose, 24 g, Oral, PRN    HYDROmorphone, 0.5 mg, Intravenous, Q4H PRN    naloxone, 0.02 mg, Intravenous, PRN    oxyCODONE, 5 mg, Oral, Q6H PRN    sodium chloride 0.9%, 10 mL, Intravenous, Q12H PRN    Allergies:   Review of patient's allergies indicates:   Allergen Reactions    Ace inhibitors Rash       Labs:  Recent Labs   Lab 06/16/25  0924   INR 1.1       Recent Labs   Lab 06/20/25  0637   WBC 10.52   HGB 8.6*   HCT 26.3*   MCV 87         Recent Labs   Lab 06/16/25  1605 06/17/25  0401  06/18/25  0404 06/19/25  0525 06/20/25  0908   * 101 101   < > 104   * 132* 133*   < > 130*   K 4.2 4.5 5.0   < > 6.1*   CL 98 98 101   < > 100   CO2 19* 17* 19*   < > 18*   BUN 79* 89* 69*   < > 84*   CREATININE 6.6* 7.4* 6.3*   < > 7.6*   CALCIUM 8.5* 8.5* 8.5*   < > 8.4*   MG 2.3 2.2  --   --   --    ALT 15  --   --   --   --    AST 15  --   --   --   --    ALBUMIN 2.1* 2.1* 2.2*  --   --    BILITOT 1.0  --   --   --   --     < > = values in this interval not displayed.         Vitals (Most Recent):  Temp: 97.8 °F (36.6 °C) (06/20/25 1134)  Pulse: 71 (06/20/25 1209)  Resp: 16 (06/20/25 1134)  BP: 104/64 (06/20/25 1134)  SpO2: (!) 90 % (06/20/25 1134)    Plan:   1. Patient not currently NPO. Spoke with weekend IR provider (Dr. Brush), he would like to initiate a heparin drip now and make NPO after midnight tonight. Do not hold heparin drip for procedure. If his schedule allows, will plan for declot tomorrow. If patient needs urgent dialysis today, recommend placement of a temporary dialysis catheter.     Ben Babcock MD  Interventional Radiology  Department of Radiology

## 2025-06-20 NOTE — PLAN OF CARE
06/19/25 1926   Patient Assessment/Suction   Level of Consciousness (AVPU) alert   Respiratory Effort Normal;Unlabored   Expansion/Accessory Muscles/Retractions no retractions;no use of accessory muscles   Rhythm/Pattern, Respiratory depth regular;pattern regular;unlabored   PRE-TX-O2   Device (Oxygen Therapy) room air   SpO2 97 %   Pulse Oximetry Type Intermittent   $ Pulse Oximetry - Multiple Charge Pulse Oximetry - Multiple   Pulse 76   Resp 18   Preset CPAP/BiPAP Settings   Mode Of Delivery standby   CPAP/BIPAP charged w/in last 24 h YES   $ Initial CPAP/BiPAP Setup? No   $ Is patient using? No/refused   Reason patient is not wearing? Patient refused   Respiratory Evaluation   $ Care Plan Tech Time 15 min

## 2025-06-21 NOTE — NURSING
Patient - asleep/arousable/weak/dusky  Return from IR procedure   Admitting doctor at bedside   - Heart rhythm NSR on monitor-Bp check 89/54- respiration 13-sob- oxygen level 89% on rm air-abdominal muscles in use -place on 2 liters o2 via NC   -Recheck 91/54- Normal saline 250cc bolus given -at bedside -per dr. Gaviria  -Blood sugar check 78  Given dextrose 25g-per dR. Valderrama   Nurse will continue to monitor for any change in condition

## 2025-06-21 NOTE — ASSESSMENT & PLAN NOTE
Patient with chest pain worsened while on HD  Trops are flat 0.14  EKG with no ischemic changes  Echo with worsening wall motion as per cardio  Seen by cardiology, plan was for Left heart cath 06/09 but patient had a cardiac arrest that morning; managing medically for now  - continue ASA/plavix/statin  - Cardiology following          Patient with chest pain worsened while on HD  Trops are flat 0.14  EKG with no ischemic changes  Echo with worsening wall motion as per cardio  Seen by cardiology, plan was for Left heart cath 06/09 but patient had a cardiac arrest that morning; managing medically for now  - continue ASA/plavix/statin  - Cardiology following        Patient with chest pain worsened while on HD  Trops are flat 0.14  EKG with no ischemic changes  Echo with worsening wall motion as per cardio  Seen by cardiology, plan was for Left heart cath 06/09 but patient had a cardiac arrest that morning; managing medically for now  - continue ASA/plavix/statin  - Cardiology following

## 2025-06-21 NOTE — PROCEDURES
"  Pre Op Diagnosis: Thrombosed av graft  Post Op Diagnosis: Same    Procedure: declot    Procedure performed by: Gonsalo    Written Informed Consent Obtained: Yes  Specimen Removed: NO  Estimated Blood Loss: Minimal    Findings:   Successful declot of left arm AV graft.    Patient tolerated procedure well.    Francis Brush MD ([place])  Interventional Radiology  [phone]      "

## 2025-06-21 NOTE — PROGRESS NOTES
06/21/25 1335   Vital Signs   Temp 97.4 °F (36.3 °C)   Pulse (!) 58   Resp 18   /78   Post-Hemodialysis Assessment   Rinseback Volume (mL) 250 mL   Blood Volume Processed (Liters) 72 L   Dialyzer Clearance Clear   Duration of Treatment 210 minutes   Additional Fluid Intake (mL) 500 mL   Total UF (mL) 500 mL   Net Fluid Removal 0   Patient Response to Treatment tolerated well.   Post-Hemodialysis Comments blood returned, lines flushed, heplocked, clamped and capped

## 2025-06-21 NOTE — PROGRESS NOTES
06/20/25 1919   Vital Signs   Temp 97.7 °F (36.5 °C)   Pulse 80   Resp 18   /78   Post-Hemodialysis Assessment   Rinseback Volume (mL) 250 mL   Blood Volume Processed (Liters) 42 L   Dialyzer Clearance Clear   Duration of Treatment 120 minutes   Additional Fluid Intake (mL) 500 mL   Total UF (mL) 1000 mL   Net Fluid Removal 500   Patient Response to Treatment tolerated well.   Post-Hemodialysis Comments blood returned, lines flushed, heplocked, clamped and capped.

## 2025-06-21 NOTE — NURSING
Patient -asleep/arousable - lying in bed  bp 113/64 - heart rhythm  NSR  on monitor   C/o sternal pain while coughing   Call light in reach   Safety maintain

## 2025-06-21 NOTE — NURSING
"Pt arrived on unit at 1808. /60, P:79, Resp:19, Spo2:98, Temp: 98.0. On continous bipap 50%15/5. Ht: 5'9" Wt: 76 Kg. Pt needs are met and safety maintained. Pt belonging/ call bell within reach, bed lowered an locked.     "

## 2025-06-21 NOTE — SEDATION DOCUMENTATION
Procedure completed. Patient tolerated well; VSS. Site CDI. Patient to be transported back to inpatient room report to be given at the bedside.    Pulse ox removed from ear prior to start of procedure, was not working. Noted injury to ear. Picture uploaded in chart and LDA added

## 2025-06-21 NOTE — NURSING
Patient brought back to inpatient room 454 by This IR nurse and IR tech. Bed plugged in , call light in reach, patient in NAD.     Report given to Tracy RN outside of room.

## 2025-06-21 NOTE — ASSESSMENT & PLAN NOTE
Anemia is likely due to chronic disease due to ESRD. Most recent hemoglobin and hematocrit are listed below.  Recent Labs     06/20/25  0637 06/20/25  1957 06/21/25  0648   HGB 8.6* 8.4* 8.4*   HCT 26.3* 25.7* 26.8*     Plan  - Monitor serial CBC: Daily  - Transfuse PRBC if patient becomes hemodynamically unstable, symptomatic or H/H drops below 7/21.  - Patient has received 1 units of PRBCs on 06/19.  - Patient's anemia is currently stable

## 2025-06-21 NOTE — PLAN OF CARE
1012  Message received from Nahum alcala/[facility] questioning the pt's discharge status.     1130  JUAREZ was informed by Dr Ervin that the pt will not dc today, that a temporary HD line was placed, pt is receiving HD session now, & will hopefully have a fistulagram done this afternoon.     1145  Message sent to Nahum informing of above & possible dc tomorrow.      Will continue to follow.

## 2025-06-21 NOTE — ASSESSMENT & PLAN NOTE
>>ASSESSMENT AND PLAN FOR ESRD (END STAGE RENAL DISEASE) WRITTEN ON 6/7/2025  5:21 PM BY ERICH FRAZIER MD    Creatine stable for now. BMP reviewed- noted Estimated Creatinine Clearance: 12.4 mL/min (A) (based on SCr of 6.2 mg/dL (H)). according to latest data. Based on current GFR, CKD stage is end stage.  Monitor UOP and serial BMP and adjust therapy as needed. Renally dose meds. Avoid nephrotoxic medications and procedures.    ESRD On HD,

## 2025-06-21 NOTE — ASSESSMENT & PLAN NOTE
Patient's blood pressure range in the last 24 hours was: BP  Min: 89/55  Max: 134/79.The patient's inpatient anti-hypertensive regimen is listed below:  Current Antihypertensives  metoprolol succinate (TOPROL-XL) 24 hr tablet 100 mg, 2 times daily, Oral    Plan  - BP is controlled, no changes needed to their regimen  - holding antihypertensives in setting of arrest/shock

## 2025-06-21 NOTE — PROCEDURES
Patient seen and examined on dialysis. Tolerating HD well  Via CVC - pending IR eval AVG declot today vs Monday   Vitals:    06/21/25 0548 06/21/25 0730 06/21/25 0733 06/21/25 0758   BP:   101/67 113/64   BP Location:       Patient Position:       Pulse: 81 86 67 72   Resp:   18 20   Temp:   97.7 °F (36.5 °C)    TempSrc:   Oral    SpO2:   95% 99%   Weight:       Height:           With any question please call  [phone]  BRANDY Arriola MD    [facility]     ESPERANZA Payne MD,   OMD BRANDY Almanza MD E. V. Harmon, NP I.Goldvarg-Abud, NP    [address]  [place], [id number]

## 2025-06-21 NOTE — NURSING
Rapid Response Nurse Follow-up Note     Followed up with patient for proactive rounding. Patient still noted with increased work of breathing, accessory muscle use. Unable to wean BiPAP.    Discussed with Dr. Ervin. Plan to transfer patient to ICU for closer monitoring and continuous BiPAP.     Please call Rapid Response RN, Peyton RN with any questions or concerns at [phone]

## 2025-06-21 NOTE — ASSESSMENT & PLAN NOTE
Patient has long standing persistent (>12 months) atrial fibrillation. Patient is currently in sinus rhythm. [id number] Score: 1. The patients heart rate in the last 24 hours is as follows:  Pulse  Min: 60  Max: 86     Antiarrhythmics  metoprolol succinate (TOPROL-XL) 24 hr tablet 100 mg, 2 times daily, Oral  amiodarone tablet 400 mg, 2 times daily, Oral  amiodarone tablet 200 mg, Daily, Oral    Anticoagulants  heparin 25,000 units in dextrose 5% (100 units/ml) IV bolus from bag HIGH INTENSITY nomogram - [facility], Once, Intravenous  heparin 25,000 units in dextrose 5% 250 mL (100 units/mL) infusion HIGH INTENSITY nomogram - [facility], Continuous, Intravenous  heparin 25,000 units in dextrose 5% (100 units/ml) IV bolus from bag HIGH INTENSITY nomogram - [facility], As needed (PRN), Intravenous  heparin 25,000 units in dextrose 5% (100 units/ml) IV bolus from bag HIGH INTENSITY nomogram - [facility], As needed (PRN), Intravenous  heparin (porcine) injection 2,400 Units, As needed (PRN), Intra-Catheter    Plan  - Replete lytes with a goal of K>4, Mg >2  - Patient is anticoagulated, see medications listed above.  - Patient's afib is currently controlled  On AC now

## 2025-06-21 NOTE — SUBJECTIVE & OBJECTIVE
Interval History:   I have Seen and examined the patient today  Patient feels okay, complaining that he is hungry given that he's been NPO.    Patient went for HD this morning and plan for AVF declot after HD.    Review of Systems   Constitutional: Negative.    HENT: Negative.     Respiratory: Negative.     Cardiovascular: Negative.    Gastrointestinal:  Positive for constipation.   Genitourinary: Negative.    Musculoskeletal: Negative.    Skin: Negative.    Neurological: Negative.    Psychiatric/Behavioral: Negative.       Objective:     Vital Signs (Most Recent):  Temp: 97.7 °F (36.5 °C) (06/21/25 0945)  Pulse: 60 (06/21/25 1300)  Resp: 18 (06/21/25 0945)  BP: 121/86 (06/21/25 1300)  SpO2: 99 % (06/21/25 0758) Vital Signs (24h Range):  Temp:  [97 °F (36.1 °C)-97.9 °F (36.6 °C)] 97.7 °F (36.5 °C)  Pulse:  [60-86] 60  Resp:  [18-22] 18  SpO2:  [95 %-100 %] 99 %  BP: ()/(54-86) 121/86     Weight: 81.6 kg (179 lb 14.3 oz)  Body mass index is 25.81 kg/m².    Intake/Output Summary (Last 24 hours) at 6/21/2025 1323  Last data filed at 6/21/2025 1200  Gross per 24 hour   Intake 628.98 ml   Output 1000 ml   Net -371.02 ml         Physical Exam  Constitutional:       Appearance: He is ill-appearing.   HENT:      Head: Normocephalic and atraumatic.   Cardiovascular:      Rate and Rhythm: Normal rate.   Pulmonary:      Effort: Pulmonary effort is normal.      Breath sounds: Normal breath sounds.   Skin:     General: Skin is warm.   Neurological:      General: No focal deficit present.      Mental Status: He is alert. Mental status is at baseline.   Psychiatric:         Mood and Affect: Mood normal.         Behavior: Behavior normal.               Significant Labs: All pertinent labs within the past 24 hours have been reviewed.    Significant Imaging: I have reviewed all pertinent imaging results/findings within the past 24 hours.

## 2025-06-21 NOTE — PROGRESS NOTES
[facility] Medicine  Progress Note    Patient Name: Domingo Gross  MRN: [id number]  Patient Class: IP- Inpatient   Admission Date: 6/7/2025  Length of Stay: 14 days  Attending Physician: Nima Ervin MD  Primary Care Provider: Geeta Coffey MD        Subjective     Principal Problem:Cardiac arrest        HPI:  64 y.o. male with PMH ESRD on HD, PAD with multiple interventions, CAD (mid LAD/prox RCA PCI 3/2019 and prox LCA in 10/2019 following out of hospital cardiac arrest), after cardiac arrest in setting of prox LCX occlusion treated with PCI, 9/16/2024 s/p intervention w cutting/shockwave PTCA to Lcx, 01/24/25 PCI of OM, new reduction in EF presents to ER with substernal chest pain that radiates to his throat. Report his chest pain feels like heaviness as if something is heavy sitting on his chest, started last night at rest, he took sublingual nitro which only slightly helped but he was still feeling it, today as he was having HD the pain got worse so he came to the ED.    Patient denies smoking or alcohol use        Overview/Hospital Course:  No notes on file    Interval History:   I have Seen and examined the patient today  Patient feels okay, complaining that he is hungry given that he's been NPO.    Patient went for HD this morning and plan for AVF declot after HD.    Review of Systems   Constitutional: Negative.    HENT: Negative.     Respiratory: Negative.     Cardiovascular: Negative.    Gastrointestinal:  Positive for constipation.   Genitourinary: Negative.    Musculoskeletal: Negative.    Skin: Negative.    Neurological: Negative.    Psychiatric/Behavioral: Negative.       Objective:     Vital Signs (Most Recent):  Temp: 97.7 °F (36.5 °C) (06/21/25 0945)  Pulse: 60 (06/21/25 1300)  Resp: 18 (06/21/25 0945)  BP: 121/86 (06/21/25 1300)  SpO2: 99 % (06/21/25 0758) Vital Signs (24h Range):  Temp:  [97 °F (36.1 °C)-97.9 °F (36.6 °C)] 97.7 °F (36.5 °C)  Pulse:  [60-86] 60  Resp:   [18-22] 18  SpO2:  [95 %-100 %] 99 %  BP: ()/(54-86) 121/86     Weight: 81.6 kg (179 lb 14.3 oz)  Body mass index is 25.81 kg/m².    Intake/Output Summary (Last 24 hours) at 6/21/2025 1323  Last data filed at 6/21/2025 1200  Gross per 24 hour   Intake 628.98 ml   Output 1000 ml   Net -371.02 ml         Physical Exam  Constitutional:       Appearance: He is ill-appearing.   HENT:      Head: Normocephalic and atraumatic.   Cardiovascular:      Rate and Rhythm: Normal rate.   Pulmonary:      Effort: Pulmonary effort is normal.      Breath sounds: Normal breath sounds.   Skin:     General: Skin is warm.   Neurological:      General: No focal deficit present.      Mental Status: He is alert. Mental status is at baseline.   Psychiatric:         Mood and Affect: Mood normal.         Behavior: Behavior normal.               Significant Labs: All pertinent labs within the past 24 hours have been reviewed.    Significant Imaging: I have reviewed all pertinent imaging results/findings within the past 24 hours.      Assessment & Plan  Cardiac arrest  Patient had cardiac arrest requiring ACLS x3 on 6/9  Remarkable improvement  - extubated on 06/11.  Tolerating well  - Cardiology following    NSTEMI (non-ST elevated myocardial infarction)  History of MI (myocardial infarction)  Patient with chest pain worsened while on HD  Trops are flat 0.14  EKG with no ischemic changes  Echo with worsening wall motion as per cardio  Seen by cardiology, plan was for Left heart cath 06/09 but patient had a cardiac arrest that morning; managing medically for now  - continue ASA/plavix/statin  - Cardiology following          Patient with chest pain worsened while on HD  Trops are flat 0.14  EKG with no ischemic changes  Echo with worsening wall motion as per cardio  Seen by cardiology, plan was for Left heart cath 06/09 but patient had a cardiac arrest that morning; managing medically for now  - continue ASA/plavix/statin  - Cardiology  following        Patient with chest pain worsened while on HD  Trops are flat 0.14  EKG with no ischemic changes  Echo with worsening wall motion as per cardio  Seen by cardiology, plan was for Left heart cath 06/09 but patient had a cardiac arrest that morning; managing medically for now  - continue ASA/plavix/statin  - Cardiology following          HFrEF (heart failure with reduced ejection fraction)  - likely ischemic  - volume control with dialysis    Repeat echo showed   Left Ventricle: The left ventricle is moderately dilated. Mildly increased wall thickness. There is eccentric hypertrophy. Regional wall motion abnormalities present. See diagram for wall motion findings. There is moderately reduced systolic function with a visually estimated ejection fraction of 35 - 40%. Quantitated ejection fraction is 38%. Unable to assess diastolic function due to poor image quality.    Right Ventricle: The right ventricle is normal in size Systolic function is normal. TAPSE is 2.3 cm.    Overall the study quality was technically difficult.  Paroxysmal atrial fibrillation  Patient has long standing persistent (>12 months) atrial fibrillation. Patient is currently in sinus rhythm. [id number] Score: 1. The patients heart rate in the last 24 hours is as follows:  Pulse  Min: 60  Max: 86     Antiarrhythmics  metoprolol succinate (TOPROL-XL) 24 hr tablet 100 mg, 2 times daily, Oral  amiodarone tablet 400 mg, 2 times daily, Oral  amiodarone tablet 200 mg, Daily, Oral    Anticoagulants  heparin 25,000 units in dextrose 5% (100 units/ml) IV bolus from bag HIGH INTENSITY nomogram - [facility], Once, Intravenous  heparin 25,000 units in dextrose 5% 250 mL (100 units/mL) infusion HIGH INTENSITY nomogram - [facility], Continuous, Intravenous  heparin 25,000 units in dextrose 5% (100 units/ml) IV bolus from bag HIGH INTENSITY nomogram - [facility], As needed (PRN), Intravenous  heparin 25,000 units in dextrose 5% (100 units/ml) IV bolus from bag HIGH  INTENSITY nomogram - [facility], As needed (PRN), Intravenous  heparin (porcine) injection 2,400 Units, As needed (PRN), Intra-Catheter    Plan  - Replete lytes with a goal of K>4, Mg >2  - Patient is anticoagulated, see medications listed above.  - Patient's afib is currently controlled  On AC now    ESRD (end stage renal disease)   >>ASSESSMENT AND PLAN FOR ESRD (END STAGE RENAL DISEASE) WRITTEN ON 6/7/2025  5:21 PM BY ERICH FRAZIER MD    Creatine stable for now. BMP reviewed- noted Estimated Creatinine Clearance: 12.4 mL/min (A) (based on SCr of 6.2 mg/dL (H)). according to latest data. Based on current GFR, CKD stage is end stage.  Monitor UOP and serial BMP and adjust therapy as needed. Renally dose meds. Avoid nephrotoxic medications and procedures.    ESRD On HD,  Primary hypertension  Patient's blood pressure range in the last 24 hours was: BP  Min: 89/55  Max: 134/79.The patient's inpatient anti-hypertensive regimen is listed below:  Current Antihypertensives  metoprolol succinate (TOPROL-XL) 24 hr tablet 100 mg, 2 times daily, Oral    Plan  - BP is controlled, no changes needed to their regimen  - holding antihypertensives in setting of arrest/shock    Hyperlipidemia  -Continue statin  Anemia due to chronic kidney disease, on chronic dialysis  Anemia is likely due to chronic disease due to ESRD. Most recent hemoglobin and hematocrit are listed below.  Recent Labs     06/20/25  0637 06/20/25  1957 06/21/25  0648   HGB 8.6* 8.4* 8.4*   HCT 26.3* 25.7* 26.8*     Plan  - Monitor serial CBC: Daily  - Transfuse PRBC if patient becomes hemodynamically unstable, symptomatic or H/H drops below 7/21.  - Patient has received 1 units of PRBCs on 06/19.  - Patient's anemia is currently stable    Hypothyroidism  Cont levothyroxine     Atherosclerosis of native coronary artery of native heart with unstable angina pectoris  Patient with known CAD s/p stent placement, which is controlled Will continue ASA, Plavix, and  Statin and monitor for S/Sx of angina/ACS. Continue to monitor on telemetry.   Prolonged Q-T interval on ECG  -continue to monitor    AV fistula thrombosis  Patient was not able to get dialysis 06/20because of inability to access his AV fistula  IR contacted, for declot, but patient was not NPO, started on heparin drip  Temp cath was placed and patient got HD 06/20-21  Plan for AVF declot by IR today.      VTE Risk Mitigation (From admission, onward)           Ordered     heparin (porcine) injection 2,400 Units  As needed (PRN)         06/20/25 1729     heparin 25,000 units in dextrose 5% (100 units/ml) IV bolus from bag HIGH INTENSITY nomogram - [facility]  As needed (PRN)        Question:  Heparin Infusion Adjustment (DO NOT MODIFY ANSWER)  Answer:  [email or web address]\Images\Pharmacy\HeparinInfusions\heparin HIGH INTENSITY nomogram for [facility] [id number]    06/20/25 1616     heparin 25,000 units in dextrose 5% (100 units/ml) IV bolus from bag HIGH INTENSITY nomogram - [facility]  As needed (PRN)        Question:  Heparin Infusion Adjustment (DO NOT MODIFY ANSWER)  Answer:  [email or web address]\Images\Pharmacy\HeparinInfusions\heparin HIGH INTENSITY nomogram for [facility] [id number]    06/20/25 1616     heparin 25,000 units in dextrose 5% (100 units/ml) IV bolus from bag HIGH INTENSITY nomogram - [facility]  Once        Question:  Heparin Infusion Adjustment (DO NOT MODIFY ANSWER)  Answer:  [email or web address]\Images\Pharmacy\HeparinInfusions\heparin HIGH INTENSITY nomogram for [facility] [id number]    06/20/25 1616     heparin 25,000 units in dextrose 5% 250 mL (100 units/mL) infusion HIGH INTENSITY nomogram - [facility]  Continuous        Question:  Begin at (units/kg/hr)  Answer:  18    06/20/25 1616     IP VTE HIGH RISK PATIENT  Once         06/07/25 1155     Place sequential compression device  Until discontinued         06/07/25 1155                    Discharge Planning   FLACO: 6/22/2025     Code Status: Full Code   Medical Readiness for Discharge Date:  6/20/2025  Discharge Plan A: Rehab ([facility])   Discharge Delays:  (pending medical stability)              Please place Justification for DME      Nima Ervin MD  Department of Hospital Medicine   [facility]

## 2025-06-21 NOTE — SEDATION DOCUMENTATION
Pt arrived to suite for Left ar fistula declott. Pt oriented to unit and staff. Plan of care reviewed with patient, patient verbalizes understanding. Comfort measures utilized. Pt safely transferred from stretcher to procedural table. Fall risk reviewed with patient, fall risk interventions maintained. Safety strap applied, positioner pillows utilized to minimize pressure points. Blankets applied. Pt prepped and draped utilizing standard sterile technique. Patient placed on continuous monitoring, as required by sedation policy. Timeouts completed utilizing standard universal time-out, per department and facility policy. RN to remain at bedside, continuous monitoring maintained. Pt resting comfortably. Denies pain/discomfort. Will continue to monitor. See flow sheets for monitoring, medication administration, and updates.

## 2025-06-21 NOTE — ASSESSMENT & PLAN NOTE
Patient was not able to get dialysis 06/20because of inability to access his AV fistula  IR contacted, for declot, but patient was not NPO, started on heparin drip  Temp cath was placed and patient got HD 06/20-21  Plan for AVF declot by IR today.

## 2025-06-21 NOTE — NURSING
Rapid Response Nurse Follow-up Note     Proactive round requested by charge NICOLAS Mendiola. Concern for altered mental status and respiratory distress. Recent IR procedure with IV sedation (Versed & Fentanyl). Dr. Ervin at bedside to assess patient.   VS stable. POCT BG 78, given 25g of D50.  Patient noted to be drowsy, but easily arousable. Abdominal/accessory muscle use.     Recommendations:   - STAT ABG   - Trial BiPAP for work of breathing. Re-assess need for BiPAP in ~1 hour.   - Monitor VS.     Please call Rapid Response RN, NICOLAS Todd with any questions or concerns at [phone]

## 2025-06-22 NOTE — ASSESSMENT & PLAN NOTE
>>ASSESSMENT AND PLAN FOR ESRD (END STAGE RENAL DISEASE) WRITTEN ON 6/7/2025  5:21 PM BY ERICH FRAZIER MD    Creatine stable for now. BMP reviewed- noted Estimated Creatinine Clearance: 17.1 mL/min (A) (based on SCr of 4.5 mg/dL (H)). according to latest data. Based on current GFR, CKD stage is end stage.  Monitor UOP and serial BMP and adjust therapy as needed. Renally dose meds. Avoid nephrotoxic medications and procedures.    ESRD On HD,

## 2025-06-22 NOTE — PT/OT/SLP PROGRESS
Physical Therapy Treatment    Patient Name:  Dominog Gross   MRN:  [id number]    Recommendations:     Discharge Recommendations: High Intensity Therapy  Discharge Equipment Recommendations: bedside commode, walker, rolling, to be determined by next level of care  Barriers to discharge: poor endurance,     Assessment:     Domingo Gross is a 65 y.o. male admitted with a medical diagnosis of Cardiac arrest.  He presents with the following impairments/functional limitations: weakness, impaired endurance, impaired self care skills, impaired functional mobility, gait instability, impaired balance, decreased lower extremity function, impaired cardiopulmonary response to activity     Patient seen for physical therapy session on this date in setting of ICU.  Patient with initial BP of 84/56 while supine, nursing ok'd for OOB.  Patient performs supine to sit with mod assist, seated BP  84/54, HR 75, no complaints of dizziness.  Patient performs 3 sit to stand trials with RW and CGA and takes lateral steps with CGA.  Patient then ambulates x 5' to bedside chair with RW and CGA.  Patient with mild SOB after gait to chair, BP at 96/56 and HR 89.  Patient agrees to sit up in chair, educated on B LE exercises.  Continue to recommend High Intensity therapy at discharge. .    Rehab Prognosis: Good; patient would benefit from acute skilled PT services to address these deficits and reach maximum level of function.    Recent Surgery: Procedure(s) (LRB):  Insertion,central venous access device (N/A) 2 Days Post-Op    Plan:     During this hospitalization, patient to be seen 5 x/week to address the identified rehab impairments via gait training, therapeutic activities, therapeutic exercises, neuromuscular re-education and progress toward the following goals:    Plan of Care Expires:  07/14/25    Subjective     Chief Complaint: chest soreness with transitioning to sitting  Patient/Family Comments/goals: to return to  PLOF  Pain/Comfort:  Pain Rating 1: 0/10  Pain Rating Post-Intervention 1: 0/10      Objective:     Communicated with nurse prior to session.  Patient found HOB elevated with blood pressure cuff, pulse ox (continuous), telemetry upon PT entry to room.     General Precautions: Standard, fall  Orthopedic Precautions: N/A  Braces: N/A  Respiratory Status: Room air     Functional Mobility:  Bed Mobility:     Rolling Right: minimum assistance  Scooting: minimum assistance  Supine to Sit: moderate assistance  Transfers:     Sit to Stand:  contact guard assistance with rolling walker x 3 trials  Gait: 1.  Lateral steps at EOB x 5-6 with CGA with RW  2.  Patient ambulates with RW x ~ 5' to bedside chair with CGA, slow pace, mild SOB after gait  Balance: Seated:  SBA   Standing: requires RW, CGA      AM-PAC 6 CLICK MOBILITY  Turning over in bed (including adjusting bedclothes, sheets and blankets)?: 2  Sitting down on and standing up from a chair with arms (e.g., wheelchair, bedside commode, etc.): 3  Moving from lying on back to sitting on the side of the bed?: 2  Moving to and from a bed to a chair (including a wheelchair)?: 3  Need to walk in hospital room?: 3  Climbing 3-5 steps with a railing?: 1  Basic Mobility Total Score: 14       Treatment & Education:  Patient seen for physical therapy session on this date in setting of ICU.  Patient with initial BP of 84/56 while supine, nursing ok'd for OOB.  Patient performs supine to sit with mod assist, seated BP  84/54, HR 75, no complaints of dizziness.  Patient performs 3 sit to stand trials with RW and CGA and takes lateral steps with CGA.  Patient then ambulates x 5' to bedside chair with RW and CGA.  Patient with mild SOB after gait to chair, BP at 96/56 and HR 89.  Patient agrees to sit up in chair, educated on B LE exercises.  Continue to recommend High Intensity therapy at discharge.     Patient left up in chair with all lines intact and call button in  reach..    GOALS:   Multidisciplinary Problems       Physical Therapy Goals          Problem: Physical Therapy    Goal Priority Disciplines Outcome Interventions   Physical Therapy Goal     PT, PT/OT Progressing    Description: Goals to be met by: 2025    Patient will increase functional independence with mobility by performin. Sit<>stand with MOD I with RW.  2. Gait x 75 feet with RW with MOD I.  3. Supine<>sit with SBA.   4. Bed to chair MOD I with use of RW                          DME Justifications:   Domingo requires a commode for home use because he is confined to one level of the home environment and there is no toilet on that level.   Domingo's mobility limitation cannot be sufficiently resolved by the use of a cane. His functional mobility deficit can be sufficiently resolved with the use of a Rolling Walker. Patient's mobility limitation significantly impairs their ability to participate in one of more activities of daily living.  The use of a RW will significantly improve the patient's ability to participate in MRADLS and the patient will use it on regular basis in the home.    Time Tracking:     PT Received On: 25  PT Start Time: 1245     PT Stop Time: 1309  PT Total Time (min): 24 min     Billable Minutes: Gait Training 15 and Therapeutic Activity 9    Treatment Type: Treatment  PT/PTA: PT     Number of PTA visits since last PT visit: 0     2025

## 2025-06-22 NOTE — EICU
TYLERU Night Rounds Checklist  24H Vital Sign Range:  Temp:  [97.4 °F (36.3 °C)-98.4 °F (36.9 °C)]   Pulse:  []   Resp:  [11-23]   BP: ()/(47-90)   SpO2:  [92 %-100 %]     Video rounds and LDA reconciliation

## 2025-06-22 NOTE — ASSESSMENT & PLAN NOTE
Anemia is likely due to chronic disease due to ESRD. Most recent hemoglobin and hematocrit are listed below.  Recent Labs     06/21/25  0648 06/21/25  1546 06/21/25  1551 06/22/25  0605   HGB 8.4* 7.4*  --  7.3*   HCT 26.8* 22.3* 25.8* 22.3*     Plan  - Monitor serial CBC: Daily  - Transfuse PRBC if patient becomes hemodynamically unstable, symptomatic or H/H drops below 7/21.  - Patient has received 1 units of PRBCs on 06/19.  - Patient's anemia is currently stable

## 2025-06-22 NOTE — CONSULTS
Consult Note  [facility]    Attending: Dr. Dickinson  Fellow: Dr. Ellerman   Admit Date: 6/7/2025  Today's Date: 06/22/2025  Reason for Consult:  NIPPV    ASSESSMENT & RECOMMENDATIONS     Brief HPI: 64 year old man with history of ESRD on HD, PAD, CAD status post PCI, cardiac arrest, heart failure that presents to the ED on 06/07 for evaluation of chest pain. Patient was admitted for NSTEMI and started on heparin drip and DAPT. Course was complicated by code blue this morning, no pulse. Patient was coded accordingly to ACLS protocol, ROSC in 15 mins. Patient was intubated. Patient was transferred to ICU. Post ROSC, pupils are dilated and patient posturing. Patient was started on TTM. Patient developed recurrent cardiac arrest x2 in the ICU, had episode of ventricular tachycardia before arrest, coded accordingly to ACLS and received amiodarone. The ROSC achieved in less than 5 mins. Patient was extubated and hospital course complicated by A-fib RVR that resolved. Patient stepped down to the floor. Getting routine dialysis and found to have clotted AVF. Was taken for declotting with IR and was in distress afterwards. He was placed on BiPAP and respiratory status improved. Patient stepped up to ICU yesterday for NIPPV.       Overall assessment: Patient stepped up to ICU for BiPAP following procedure. No on RA and doing well. Can step down today. Can transition back to DOAC if no further planned procedure      CNS/Neuro: no acute concerns     Cardiovascular:    CAD  Caridac arrest  A-fib  - continue ASA, plavix, HIS amio  - rec transition back to OAC from heparin gtt  - metoprolol suc held this am for BP, restart when able     Respiratory:    Acute respiratory distress, resolved  - patient brought up to ICU for biPAP after he was altered on the floor and had respiratory distress  - this morning patient weaned from bipap  - likely 2/2 sedative effects    GI/Metabolic: no acute concerns      Renal:  ESRD  - trial of dialysis via AVF then can pull central line    Heme: no acute concern    Endo:     Hypothyroidism  - cont home levothyroxine    ID:no acute concerns       SUBJECTIVE:     NAEON, patient doing well on RA denies complaints this morning except for food       OBJECTIVE:     Vital Signs Trends/Hx Reviewed  Vitals:    06/22/25 0630 06/22/25 0645 06/22/25 0700 06/22/25 0715   BP: 126/70  (!) 96/59    BP Location:       Patient Position:       Pulse: 73 72 73 74   Resp: (!) 34 15 13 14   Temp:    98.1 °F (36.7 °C)   TempSrc:    Oral   SpO2: (!) 81% (!) 90% (!) 91% (!) 90%   Weight:       Height:           Ventilator settings: na  Physical Exam:  General: NAD, cooperative & interactive.  HEENT: PERRL, EOMI, oral and nasal mucosa moist.   Neck: Supple without JVD or palpable LAD.   Cardiac: normal rate, regular rhythm, with no murmurs, rubs, gallops with brisk cap refill and symmetric pulses in distal extremities.  Respiratory: Normal inspection. Symmetric chest rise. Auscultation clear bilaterally. No increased work of breathing noted.   Abdomen: Soft, NT/ND. +BS.  Extremities: No edema.   Neuro: Grossly intact to brief exam. Oriented x3 with appropriate mood/affect to situation.       Laboratory:  Recent Labs   Lab 06/21/25 1945   PH 7.417   PCO2 42.8   PO2 23.6*   HCO3 27.6   POCSATURATED 32.8*     Recent Labs   Lab 06/22/25  0605   WBC 12.81*   RBC 2.54*   HGB 7.3*   HCT 22.3*      MCV 88   MCH 28.7   MCHC 32.7     Recent Labs   Lab 06/22/25  0605   *   K 5.1   CL 97   CO2 25   BUN 37*   CREATININE 4.5*   CALCIUM 7.7*       Microbiology Data:   Microbiology Results (last 7 days)       ** No results found for the last 168 hours. **             Chest Imaging: reviewed     Infusions:     heparin (porcine) in D5W  0-40 Units/kg/hr Intravenous Continuous   Stopped at 06/21/25 1442       Scheduled Medications:    [START ON 6/27/2025] amiodarone  200 mg Oral Daily    amiodarone  400 mg  Oral BID    aspirin  81 mg Oral Daily    atorvastatin  40 mg Oral QHS    clopidogreL  75 mg Oral Daily    folic acid  1 mg Oral Daily    glycerin adult  1 suppository Rectal Once    heparin (PORCINE)  80 Units/kg Intravenous Once    levothyroxine  75 mcg Oral Before breakfast    LIDOcaine  1 patch Transdermal Q24H    metoprolol succinate  100 mg Oral BID    perflutren protein-a microsphr  0.5 mL Intravenous Once    polyethylene glycol  17 g Oral Daily    sevelamer carbonate  1,600 mg Oral TID WM    sodium chloride 0.9%  500 mL Intravenous Once         Feed: renal  Analgesia: na  Sedation: na  Thromboembolic prophylaxis: hep gtt, switch back to DOAC if able   Head-of-bed: elevated  Ulcer prophylaxis: na  Glycemic control: goal 140-180  SAT/SBT: na  Bowel regimen: scheduled  Indwelling: Central line < take out after successful completion of dialysis after declotting AVF  De-escalate Abx: na    Code status: full    Disposition: step down, remove central line after successful dialysis session, restart DOAC if no contraindication     Thank you for allowing us to participate in the care of this patient. Please call with questions.    Cinthya Munoz MD  [facility], PGY-1

## 2025-06-22 NOTE — PROGRESS NOTES
Nephrology Consult   Note     Consult Requested By: Nima Ervin MD  Reason for Consult: ESRD     SUBJECTIVE:     ?    Review of Systems   Constitutional:  Negative for chills and fever.   Respiratory:  Negative for cough and shortness of breath.    Cardiovascular:  Negative for chest pain and leg swelling.   Gastrointestinal:  Negative for nausea.               OBJECTIVE:     Vital Signs (Most Recent)  Vitals:    06/22/25 1045 06/22/25 1100 06/22/25 1115 06/22/25 1130   BP:  109/65  107/64   BP Location:       Patient Position:       Pulse: 73 74 73 73   Resp: 10 17 12 14   Temp:   98.1 °F (36.7 °C)    TempSrc:   Oral    SpO2: 95% (!) 94% (!) 92% (!) 91%   Weight:       Height:                           Medications:   [START ON 6/27/2025] amiodarone  200 mg Oral Daily    amiodarone  400 mg Oral BID    aspirin  81 mg Oral Daily    atorvastatin  40 mg Oral QHS    clopidogreL  75 mg Oral Daily    folic acid  1 mg Oral Daily    glycerin adult  1 suppository Rectal Once    heparin (PORCINE)  80 Units/kg Intravenous Once    levothyroxine  75 mcg Oral Before breakfast    LIDOcaine  1 patch Transdermal Q24H    metoprolol succinate  50 mg Oral BID    perflutren protein-a microsphr  0.5 mL Intravenous Once    polyethylene glycol  17 g Oral Daily    sevelamer carbonate  1,600 mg Oral TID WM    sodium chloride 0.9%  500 mL Intravenous Once             Physical Exam  Vitals and nursing note reviewed.   Constitutional:       General: He is not in acute distress.     Appearance: He is not diaphoretic.   HENT:      Head: Normocephalic and atraumatic.      Mouth/Throat:      Pharynx: No oropharyngeal exudate.   Eyes:      General: No scleral icterus.     Conjunctiva/sclera: Conjunctivae normal.      Pupils: Pupils are equal, round, and reactive to light.   Cardiovascular:      Rate and Rhythm: Normal rate and regular rhythm.      Heart sounds: Normal heart sounds. No murmur heard.  Pulmonary:      Effort: Pulmonary effort  is normal. No respiratory distress.      Breath sounds: No rales.   Abdominal:      General: Bowel sounds are normal. There is no distension.      Palpations: Abdomen is soft.      Tenderness: There is no abdominal tenderness.   Musculoskeletal:         General: Normal range of motion.      Cervical back: Normal range of motion and neck supple.      Right lower leg: No edema.      Left lower leg: No edema.      Comments: PEE ANN    Skin:     General: Skin is warm and dry.      Findings: No erythema.   Neurological:      Mental Status: He is alert and oriented to person, place, and time.      Cranial Nerves: No cranial nerve deficit.      Comments:     Psychiatric:         Mood and Affect: Affect normal.         Cognition and Memory: Memory is impaired.         Judgment: Judgment normal.         Laboratory:  Recent Labs   Lab 06/21/25  0648 06/21/25  1546 06/21/25  1551 06/22/25  0605   WBC 10.23 10.04  --  12.81*   HGB 8.4* 7.4*  --  7.3*   HCT 26.8* 22.3* 25.8* 22.3*    279  --  232   MONO 11.3  1.16* 4.4  0.44  --  10.8  1.38*     Recent Labs   Lab 06/18/25  0404 06/19/25  0525 06/21/25  0914 06/21/25  1546 06/22/25  0605   *   < > 132* 132* 134*   K 5.0   < > 5.4* 4.8 5.1      < > 99 97 97   CO2 19*   < > 20* 25 25   BUN 69*   < > 62* 23 37*   CREATININE 6.3*   < > 6.2* 3.1* 4.5*   CALCIUM 8.5*   < > 8.2* 7.6* 7.7*   PHOS 4.7*  --  6.2*  --  5.4*    < > = values in this interval not displayed.       Diagnostic Results:  X-Ray: Reviewed  US: Reviewed  Echo: Reviewed    Left Ventricle: The left ventricle is mildly dilated. There is eccentric hypertrophy. Regional wall motion abnormalities present. See diagram for wall motion findings. There is mildly reduced systolic function with a visually estimated ejection fraction of 45 - 50%. There is indeterminate diastolic function.Elevated left ventricular filling pressure.    Right Ventricle: Normal right ventricular cavity size. Wall thickness is  normal. Systolic function is normal.    Left Atrium: Left atrium is mildly dilated.    Right Atrium: Right atrium is mildly dilated.    Mitral Valve: There is mild to moderate regurgitation.    Pulmonic Valve: There is moderate regurgitation.    Pulmonary Artery: There is pulmonary hypertension. The estimated pulmonary artery systolic pressure is 47 mmHg.    IVC/SVC: Intermediate venous pressure at 8 mmHg.  ASSESSMENT/PLAN:    Patient  sent from his regular dialysis after completing his treatment develop chest pain  a cardiac arrest while on the floor.  ESRD on HD TTS with Dr Loly Payne  in [facility]      Status post cardiac arrest.  Doing excellent considering what he went through.  Patient with a history of persistent hypokalemia.  But when his cardiac arrest has been his potassium was 3.9.  Both days on Wednesday and yesterday patient ran on 4K bath   HD TTS  pending  discharge to Rehab transition to [facility]   -- however had AVG clotted - Hyperkalemia Line placed Friday HD performed Saturday Used CVC  no issues - this AM line Removed - on my assessment no thrill no bruit on AVG suspecting clotting again get US - re consult IR        HTN    was hypotensive after cardiac arrest, all of the blood pressure medications on hold.  Levophed was weaned  off      Anemia of ESRD on EPO and IV Iron outpatient -->  ferritin is too high,  hold iron.  Hold Epogen in the settings of the cardiac arrest  with underlying CAD  Recent Labs   Lab 06/21/25  0648 06/21/25  1546 06/21/25  1551 06/22/25  0605   HGB 8.4* 7.4*  --  7.3*   HCT 26.8* 22.3* 25.8* 22.3*    279  --  232       Iron   Lab Results   Component Value Date    IRON 63 06/09/2025    TIBC 232 (L) 06/09/2025    FERRITIN 2,942.0 (H) 06/11/2025         MBD - PTH at goal     Sevelamer with each meal- Resume  Ergocalciferol 22203 units weekly    Recent Labs   Lab 06/16/25  1605 06/17/25  0401   MG 2.3 2.2       Lab Results   Component Value Date    .8 (H)  06/04/2025    CALCIUM 7.7 (L) 06/22/2025    CAION 1.31 07/14/2020    PHOS 5.4 (H) 06/22/2025     Lab Results   Component Value Date    [id number] 9 (L) 03/14/2024     Acidosis - correct with HD   Lab Results   Component Value Date    CO2 25 06/22/2025     Hemodialysis access-left upper arm loop AV graft.    Nutrition/Hypoalbuminemia    Recent Labs   Lab 06/21/25  0914 06/22/25  0605   ALBUMIN 2.2* 2.2*     Nepro with meals TID.   -- poor appetite       Thank you for the consult, will follow  With any question please call [phone]  Nena Arriola MD    [facility]    ESPERANZA Payne MD,   MD BRANDY Arredondo MD E. V. Harmon, NP    [address]  [place], [address]  [phone]

## 2025-06-22 NOTE — PROGRESS NOTES
[facility] Medicine  Progress Note    Patient Name: Domingo Gross  MRN: [id number]  Patient Class: IP- Inpatient   Admission Date: 6/7/2025  Length of Stay: 15 days  Attending Physician: Nima Ervin MD  Primary Care Provider: Geeta Coffey MD        Subjective     Principal Problem:Cardiac arrest        HPI:  64 y.o. male with PMH ESRD on HD, PAD with multiple interventions, CAD (mid LAD/prox RCA PCI 3/2019 and prox LCA in 10/2019 following out of hospital cardiac arrest), after cardiac arrest in setting of prox LCX occlusion treated with PCI, 9/16/2024 s/p intervention w cutting/shockwave PTCA to Lcx, 01/24/25 PCI of OM, new reduction in EF presents to ER with substernal chest pain that radiates to his throat. Report his chest pain feels like heaviness as if something is heavy sitting on his chest, started last night at rest, he took sublingual nitro which only slightly helped but he was still feeling it, today as he was having HD the pain got worse so he came to the ED.    Patient denies smoking or alcohol use        Overview/Hospital Course:  No notes on file    Interval History:   I have Seen and examined the patient today  Patient was sent to the ICU yesterday for increased work of breathing after coming back from AVF declot procedure , along with hypotension, he looks better today, awake and alert, off the Bipap    Review of Systems   Constitutional: Negative.    HENT: Negative.     Respiratory: Negative.     Cardiovascular: Negative.    Gastrointestinal:  Positive for constipation.   Genitourinary: Negative.    Musculoskeletal: Negative.    Skin: Negative.    Neurological: Negative.    Psychiatric/Behavioral: Negative.       Objective:     Vital Signs (Most Recent):  Temp: 98.1 °F (36.7 °C) (06/22/25 1115)  Pulse: 75 (06/22/25 1430)  Resp: 15 (06/22/25 1430)  BP: (!) 104/55 (06/22/25 1430)  SpO2: 95 % (06/22/25 1430) Vital Signs (24h Range):  Temp:  [98 °F (36.7 °C)-99.4  °F (37.4 °C)] 98.1 °F (36.7 °C)  Pulse:  [] 75  Resp:  [8-36] 15  SpO2:  [81 %-100 %] 95 %  BP: ()/(47-71) 104/55     Weight: 76 kg (167 lb 8.8 oz)  Body mass index is 24.04 kg/m².    Intake/Output Summary (Last 24 hours) at 6/22/2025 1453  Last data filed at 6/22/2025 1200  Gross per 24 hour   Intake 0 ml   Output 30 ml   Net -30 ml         Physical Exam  Constitutional:       Appearance: He is ill-appearing.   HENT:      Head: Normocephalic and atraumatic.   Cardiovascular:      Rate and Rhythm: Normal rate.   Pulmonary:      Effort: Pulmonary effort is normal.      Breath sounds: Normal breath sounds.   Skin:     General: Skin is warm.   Neurological:      General: No focal deficit present.      Mental Status: He is alert. Mental status is at baseline.   Psychiatric:         Mood and Affect: Mood normal.         Behavior: Behavior normal.               Significant Labs: All pertinent labs within the past 24 hours have been reviewed.    Significant Imaging: I have reviewed all pertinent imaging results/findings within the past 24 hours.      Assessment & Plan  Cardiac arrest  Patient had cardiac arrest requiring ACLS x3 on 6/9  Remarkable improvement  - extubated on 06/11.  Tolerating well  - Cardiology following    NSTEMI (non-ST elevated myocardial infarction)  History of MI (myocardial infarction)  Patient with chest pain worsened while on HD  Trops are flat 0.14  EKG with no ischemic changes  Echo with worsening wall motion as per cardio  Seen by cardiology, plan was for Left heart cath 06/09 but patient had a cardiac arrest that morning; managing medically for now  - continue ASA/plavix/statin  - Cardiology following      Patient with chest pain worsened while on HD  Trops are flat 0.14  EKG with no ischemic changes  Echo with worsening wall motion as per cardio  Seen by cardiology, plan was for Left heart cath 06/09 but patient had a cardiac arrest that morning; managing medically for now  -  continue ASA/plavix/statin  - Cardiology following          Patient with chest pain worsened while on HD  Trops are flat 0.14  EKG with no ischemic changes  Echo with worsening wall motion as per cardio  Seen by cardiology, plan was for Left heart cath 06/09 but patient had a cardiac arrest that morning; managing medically for now  - continue ASA/plavix/statin  - Cardiology following        Patient with chest pain worsened while on HD  Trops are flat 0.14  EKG with no ischemic changes  Echo with worsening wall motion as per cardio  Seen by cardiology, plan was for Left heart cath 06/09 but patient had a cardiac arrest that morning; managing medically for now  - continue ASA/plavix/statin  - Cardiology following          HFrEF (heart failure with reduced ejection fraction)  - likely ischemic  - volume control with dialysis    Repeat echo showed   Left Ventricle: The left ventricle is moderately dilated. Mildly increased wall thickness. There is eccentric hypertrophy. Regional wall motion abnormalities present. See diagram for wall motion findings. There is moderately reduced systolic function with a visually estimated ejection fraction of 35 - 40%. Quantitated ejection fraction is 38%. Unable to assess diastolic function due to poor image quality.    Right Ventricle: The right ventricle is normal in size Systolic function is normal. TAPSE is 2.3 cm.    Overall the study quality was technically difficult.  Paroxysmal atrial fibrillation  Patient has long standing persistent (>12 months) atrial fibrillation. Patient is currently in sinus rhythm. [id number] Score: 1. The patients heart rate in the last 24 hours is as follows:  Pulse  Min: 70  Max: 129     Antiarrhythmics  amiodarone tablet 400 mg, 2 times daily, Oral  amiodarone tablet 200 mg, Daily, Oral  metoprolol succinate (TOPROL-XL) 24 hr tablet 50 mg, 2 times daily, Oral    Anticoagulants  heparin 25,000 units in dextrose 5% (100 units/ml) IV bolus from bag HIGH  INTENSITY nomogram - [facility], Once, Intravenous  heparin 25,000 units in dextrose 5% 250 mL (100 units/mL) infusion HIGH INTENSITY nomogram - [facility], Continuous, Intravenous  heparin 25,000 units in dextrose 5% (100 units/ml) IV bolus from bag HIGH INTENSITY nomogram - [facility], As needed (PRN), Intravenous  heparin 25,000 units in dextrose 5% (100 units/ml) IV bolus from bag HIGH INTENSITY nomogram - [facility], As needed (PRN), Intravenous  heparin (porcine) injection 2,400 Units, As needed (PRN), Intra-Catheter    Plan  - Replete lytes with a goal of K>4, Mg >2  - Patient is anticoagulated, see medications listed above.  - Patient's afib is currently controlled  -not on AC due to low H&H requiring transfusions     ESRD (end stage renal disease)   >>ASSESSMENT AND PLAN FOR ESRD (END STAGE RENAL DISEASE) WRITTEN ON 6/7/2025  5:21 PM BY ERICH FRAZIER MD    Creatine stable for now. BMP reviewed- noted Estimated Creatinine Clearance: 17.1 mL/min (A) (based on SCr of 4.5 mg/dL (H)). according to latest data. Based on current GFR, CKD stage is end stage.  Monitor UOP and serial BMP and adjust therapy as needed. Renally dose meds. Avoid nephrotoxic medications and procedures.    ESRD On HD,  Primary hypertension  Patient's blood pressure range in the last 24 hours was: BP  Min: 72/51  Max: 128/61.The patient's inpatient anti-hypertensive regimen is listed below:  Current Antihypertensives  metoprolol succinate (TOPROL-XL) 24 hr tablet 50 mg, 2 times daily, Oral    Plan  - BP is controlled, no changes needed to their regimen  - holding antihypertensives in setting of arrest/shock    Hyperlipidemia  -Continue statin  Anemia due to chronic kidney disease, on chronic dialysis  Anemia is likely due to chronic disease due to ESRD. Most recent hemoglobin and hematocrit are listed below.  Recent Labs     06/21/25  0648 06/21/25  1546 06/21/25  1551 06/22/25  0605   HGB 8.4* 7.4*  --  7.3*   HCT 26.8* 22.3* 25.8* 22.3*     Plan  - Monitor  serial CBC: Daily  - Transfuse PRBC if patient becomes hemodynamically unstable, symptomatic or H/H drops below 7/21.  - Patient has received 1 units of PRBCs on 06/19.  - Patient's anemia is currently stable    Hypothyroidism  Cont levothyroxine     Atherosclerosis of native coronary artery of native heart with unstable angina pectoris  Patient with known CAD s/p stent placement, which is controlled Will continue ASA, Plavix, and Statin and monitor for S/Sx of angina/ACS. Continue to monitor on telemetry.   Prolonged Q-T interval on ECG  -continue to monitor    AV fistula thrombosis  Patient was not able to get dialysis 06/20 because of inability to access his AV fistula  Temp cath was placed and patient got HD 06/20 -21 , cath removed 06/22   AVF declot by IR 06/21    VTE Risk Mitigation (From admission, onward)           Ordered     heparin (porcine) injection 2,400 Units  As needed (PRN)         06/20/25 1729     heparin 25,000 units in dextrose 5% (100 units/ml) IV bolus from bag HIGH INTENSITY nomogram - [facility]  As needed (PRN)        Question:  Heparin Infusion Adjustment (DO NOT MODIFY ANSWER)  Answer:  \[email or web address]\[email or web address]\[email or web address]\heparin HIGH INTENSITY nomogram for [facility] [id number]    06/20/25 1616     heparin 25,000 units in dextrose 5% (100 units/ml) IV bolus from bag HIGH INTENSITY nomogram - [facility]  As needed (PRN)        Question:  Heparin Infusion Adjustment (DO NOT MODIFY ANSWER)  Answer:  \[email or web address]\Images\[email or web address]\[email or web address]\heparin HIGH INTENSITY nomogram for [facility] [id number].[email or web address]    06/20/25 1616     heparin 25,000 units in dextrose 5% (100 units/ml) IV bolus from bag HIGH INTENSITY nomogram - [facility]  Once        Question:  Heparin Infusion Adjustment (DO NOT MODIFY ANSWER)  Answer:  \[email or web address]\Images\[email or web address]\[email or web address]\heparin HIGH INTENSITY nomogram for [facility] [id number]    06/20/25 1616     heparin 25,000 units in dextrose 5% 250 mL (100 units/mL) infusion HIGH  INTENSITY nomogram - [facility]  Continuous        Question:  Begin at (units/kg/hr)  Answer:  18    06/20/25 1616     IP VTE HIGH RISK PATIENT  Once         06/07/25 1155     Place sequential compression device  Until discontinued         06/07/25 1155                    Discharge Planning   FLACO: 6/22/2025     Code Status: Full Code   Medical Readiness for Discharge Date: 6/20/2025  Discharge Plan A: Rehab ([facility])   Discharge Delays:  (pending medical stability)          Critical care time spent on the evaluation and treatment of severe organ dysfunction, review of pertinent labs and imaging studies, discussions with consulting providers and discussions with patient/family: 25 minutes.    Please place Justification for DME      Nima Ervin MD  Department of Hospital Medicine   [facility] - Intensive Care

## 2025-06-22 NOTE — ASSESSMENT & PLAN NOTE
Patient has long standing persistent (>12 months) atrial fibrillation. Patient is currently in sinus rhythm. [id number] Score: 1. The patients heart rate in the last 24 hours is as follows:  Pulse  Min: 70  Max: 129     Antiarrhythmics  amiodarone tablet 400 mg, 2 times daily, Oral  amiodarone tablet 200 mg, Daily, Oral  metoprolol succinate (TOPROL-XL) 24 hr tablet 50 mg, 2 times daily, Oral    Anticoagulants  heparin 25,000 units in dextrose 5% (100 units/ml) IV bolus from bag HIGH INTENSITY nomogram - [facility], Once, Intravenous  heparin 25,000 units in dextrose 5% 250 mL (100 units/mL) infusion HIGH INTENSITY nomogram - [facility], Continuous, Intravenous  heparin 25,000 units in dextrose 5% (100 units/ml) IV bolus from bag HIGH INTENSITY nomogram - [facility], As needed (PRN), Intravenous  heparin 25,000 units in dextrose 5% (100 units/ml) IV bolus from bag HIGH INTENSITY nomogram - [facility], As needed (PRN), Intravenous  heparin (porcine) injection 2,400 Units, As needed (PRN), Intra-Catheter    Plan  - Replete lytes with a goal of K>4, Mg >2  - Patient is anticoagulated, see medications listed above.  - Patient's afib is currently controlled  -not on AC due to low H&H requiring transfusions

## 2025-06-22 NOTE — PLAN OF CARE
Patient seen for physical therapy session on this date in setting of ICU.  Patient with initial BP of 84/56 while supine, nursing ok'd for OOB.  Patient performs supine to sit with mod assist, seated BP  84/54, HR 75, no complaints of dizziness.  Patient performs 3 sit to stand trials with RW and CGA and takes lateral steps with CGA.  Patient then ambulates x 5' to bedside chair with RW and CGA.  Patient with mild SOB after gait to chair, BP at 96/56 and HR 89.  Patient agrees to sit up in chair, educated on B LE exercises.  Continue to recommend High Intensity therapy at discharge.       Problem: Physical Therapy  Goal: Physical Therapy Goal  Description: Goals to be met by: 2025    Patient will increase functional independence with mobility by performin. Sit<>stand with MOD I with RW.  2. Gait x 75 feet with RW with MOD I.  3. Supine<>sit with SBA.   4. Bed to chair MOD I with use of RW     Outcome: Progressing

## 2025-06-22 NOTE — ASSESSMENT & PLAN NOTE
Patient's blood pressure range in the last 24 hours was: BP  Min: 72/51  Max: 128/61.The patient's inpatient anti-hypertensive regimen is listed below:  Current Antihypertensives  metoprolol succinate (TOPROL-XL) 24 hr tablet 50 mg, 2 times daily, Oral    Plan  - BP is controlled, no changes needed to their regimen  - holding antihypertensives in setting of arrest/shock

## 2025-06-22 NOTE — SUBJECTIVE & OBJECTIVE
Interval History:   I have Seen and examined the patient today  Patient was sent to the ICU yesterday for increased work of breathing after coming back from AVF declot procedure , along with hypotension, he looks better today, awake and alert, off the Bipap    Review of Systems   Constitutional: Negative.    HENT: Negative.     Respiratory: Negative.     Cardiovascular: Negative.    Gastrointestinal:  Positive for constipation.   Genitourinary: Negative.    Musculoskeletal: Negative.    Skin: Negative.    Neurological: Negative.    Psychiatric/Behavioral: Negative.       Objective:     Vital Signs (Most Recent):  Temp: 98.1 °F (36.7 °C) (06/22/25 1115)  Pulse: 75 (06/22/25 1430)  Resp: 15 (06/22/25 1430)  BP: (!) 104/55 (06/22/25 1430)  SpO2: 95 % (06/22/25 1430) Vital Signs (24h Range):  Temp:  [98 °F (36.7 °C)-99.4 °F (37.4 °C)] 98.1 °F (36.7 °C)  Pulse:  [] 75  Resp:  [8-36] 15  SpO2:  [81 %-100 %] 95 %  BP: ()/(47-71) 104/55     Weight: 76 kg (167 lb 8.8 oz)  Body mass index is 24.04 kg/m².    Intake/Output Summary (Last 24 hours) at 6/22/2025 1453  Last data filed at 6/22/2025 1200  Gross per 24 hour   Intake 0 ml   Output 30 ml   Net -30 ml         Physical Exam  Constitutional:       Appearance: He is ill-appearing.   HENT:      Head: Normocephalic and atraumatic.   Cardiovascular:      Rate and Rhythm: Normal rate.   Pulmonary:      Effort: Pulmonary effort is normal.      Breath sounds: Normal breath sounds.   Skin:     General: Skin is warm.   Neurological:      General: No focal deficit present.      Mental Status: He is alert. Mental status is at baseline.   Psychiatric:         Mood and Affect: Mood normal.         Behavior: Behavior normal.               Significant Labs: All pertinent labs within the past 24 hours have been reviewed.    Significant Imaging: I have reviewed all pertinent imaging results/findings within the past 24 hours.

## 2025-06-22 NOTE — NURSING TRANSFER
Nursing Transfer Note      6/22/2025   3:21 PM    Nurse giving handoff: NICOLAS Astorga   Nurse receiving handoff: NICOLAS Craig     Reason patient is being transferred: Step down    Transfer To: Tele Rm 455    Transfer via wheelchair    Transfer with cardiac monitoring    Transported by NICOLAS Astorga     Transfer Vital Signs:  Blood Pressure:103/58  Heart Rate:73  O2:95  Temperature:98.1  Respirations:18    Telemetry: Box Number [id number] and Rhythm NSR  Order for Tele Monitor? Yes    Additional Lines: none    Medicines sent: none    Any special needs or follow-up needed: none    Patient belongings transferred with patient: Yes    Chart send with patient: Yes    Notified: spouse    Patient reassessed at: 06/22/2025 @4260 (date, time)  1  Upon arrival to floor: cardiac monitor applied, patient oriented to room, call bell in reach, and bed in lowest position

## 2025-06-22 NOTE — PLAN OF CARE
Pt rested well throughout the night. OK to hold Metoprolol per MD d/t low BP's. Pt made NPO at midnight. Total UOP 30cc this shift, on HD and oliguric. 1 BM this shift. VSS, NSR noted on monitor. On 2L NC, O2 sats within normal limits. POC updated with patient. Pt safety and comfort maintained throughout this shift. Care ongoing.

## 2025-06-22 NOTE — ASSESSMENT & PLAN NOTE
Patient was not able to get dialysis 06/20 because of inability to access his AV fistula  Temp cath was placed and patient got HD 06/20 -21 , cath removed 06/22   AVF declot by IR 06/21

## 2025-06-22 NOTE — EICU
Virtual ICU Quality Rounds    Admit Date: 6/7/2025  Hospital Day: 15    ICU Day: 20h    24H Vital Sign Range:  Temp:  [98 °F (36.7 °C)-99.4 °F (37.4 °C)]   Pulse:  [70-88]   Resp:  [8-34]   BP: ()/(47-71)   SpO2:  [81 %-100 %]     VICU Surveillance Screening      LDA reconciliation : Yes

## 2025-06-23 NOTE — PROGRESS NOTES
06/23/25 1345   Vital Signs   Temp 96 °F (35.6 °C)   Pulse 68   Resp 20   BP (!) 97/54   Post-Hemodialysis Assessment   Rinseback Volume (mL) 250 mL   Blood Volume Processed (Liters) 42 L   Dialyzer Clearance Clear   Duration of Treatment 120 minutes   Additional Fluid Intake (mL) 250 mL   Total UF (mL) 250 mL   Net Fluid Removal 1000   Patient Response to Treatment 750   Post-Hemodialysis Comments pt escorted to room,bedside report given

## 2025-06-23 NOTE — ASSESSMENT & PLAN NOTE
>>ASSESSMENT AND PLAN FOR ESRD (END STAGE RENAL DISEASE) WRITTEN ON 6/7/2025  5:21 PM BY ERICH FRAZIER MD    Creatine stable for now. BMP reviewed- noted Estimated Creatinine Clearance: 13.1 mL/min (A) (based on SCr of 5.9 mg/dL (H)). according to latest data. Based on current GFR, CKD stage is end stage.  Monitor UOP and serial BMP and adjust therapy as needed. Renally dose meds. Avoid nephrotoxic medications and procedures.    ESRD On HD,

## 2025-06-23 NOTE — PLAN OF CARE
0830  DC order noted.     0845  CM informed Moriah w/[facility] of the pt's discharge status. Moriah stated that the pt will need to receive his HD session prior to dc today.        06/23/25 0900   Rounds   Attendance Nurse ;Provider   Discharge Plan A Rehab  ([facility])   Transition of Care Barriers Transportation     0900  CM was informed by Dr Ervin that the pt is medically stable to discharge to [facility] if he AVF is working during his HD session today. Transportation packet left at the nurse's station.     0920  Pt off the unit receiving his HD session when CM rounded. No family present. CM informed nurse Bebe of the pt's discharge status. Bebe stated that the pt's AVF is malfunctioning again this AM.     0930  CM informed Dr Ervin of above.     0940  Message sent to Moriah informing of above.    0950  IR consult richard for thrombectomy A/V fistula noted.       Will continue to follow.

## 2025-06-23 NOTE — ASSESSMENT & PLAN NOTE
Patient has long standing persistent (>12 months) atrial fibrillation. Patient is currently in sinus rhythm. [id number] Score: 1. The patients heart rate in the last 24 hours is as follows:  Pulse  Min: 66  Max: 74     Antiarrhythmics  amiodarone tablet 400 mg, 2 times daily, Oral  amiodarone tablet 200 mg, Daily, Oral  metoprolol succinate (TOPROL-XL) 24 hr tablet 50 mg, 2 times daily, Oral    Anticoagulants  heparin 25,000 units in dextrose 5% (100 units/ml) IV bolus from bag HIGH INTENSITY nomogram - [facility], Once, Intravenous  heparin 25,000 units in dextrose 5% 250 mL (100 units/mL) infusion HIGH INTENSITY nomogram - [facility], Continuous, Intravenous  heparin 25,000 units in dextrose 5% (100 units/ml) IV bolus from bag HIGH INTENSITY nomogram - [facility], As needed (PRN), Intravenous  heparin 25,000 units in dextrose 5% (100 units/ml) IV bolus from bag HIGH INTENSITY nomogram - [facility], As needed (PRN), Intravenous  heparin (porcine) injection 2,400 Units, As needed (PRN), Intra-Catheter    Plan  - Replete lytes with a goal of K>4, Mg >2  - Patient is anticoagulated, see medications listed above.  - Patient's afib is currently controlled  -not on AC due to low H&H requiring transfusions

## 2025-06-23 NOTE — SEDATION DOCUMENTATION
Pt arrived to suite for  repeat left avg fistulagram. Pt oriented to unit and staff. Plan of care reviewed with patient, patient verbalizes understanding. Comfort measures utilized. Pt safely transferred from stretcher to procedural table. Fall risk reviewed with patient, fall risk interventions maintained. Safety strap applied, positioner pillows utilized to minimize pressure points. Blankets applied. Pt prepped and draped utilizing standard sterile technique. Patient placed on continuous monitoring, as required by sedation policy. Timeouts completed utilizing standard universal time-out, per department and facility policy. RN to remain at bedside, continuous monitoring maintained. Pt resting comfortably. Denies pain/discomfort. Will continue to monitor. See flow sheets for monitoring, medication administration, and updates.

## 2025-06-23 NOTE — PT/OT/SLP PROGRESS
Speech Language Pathology      Domingo Gross  MRN: [id number]      11:00AM:    Patient not seen today secondary to (off the floor for dialysis and then to Interventional Radiology for procedure).  Will follow-up as able.            6/23/2025         .

## 2025-06-23 NOTE — NURSING
Report called to Bebe VALENZUELA. Patient in NAD. Patient to be transported to dialysis from  to use declotted/stented graft.

## 2025-06-23 NOTE — NURSING
"PROACTIVE ROUNDING NURSE AI ALERT       AI alert received.    Chart Reviewed: 06/23/2025, 5:04 PM    MRN: [id number]  Bed: K455/K455 A    Dx: Cardiac arrest    Domingo Gross has a past medical history of Acute congestive heart failure, Allergy, Anemia, Anticoagulant long-term use, Arthritis, CKD (chronic kidney disease) stage 4, GFR 15-29 ml/min, Closed fracture of transverse process of lumbar vertebra, COPD (chronic obstructive pulmonary disease), Coronary artery disease, Heart attack, Hematuria, Hemothorax, Hyperlipidemia, Hypertension, Hyperuricemia, Hypocalcemia, Hypokalemia, Hypophosphatemia, Hypothyroidism, PAD (peripheral artery disease), Proteinuria, and Vitamin D deficiency.    Last VS: BP (!) 84/59 (BP Location: Right arm, Patient Position: Lying)   Pulse 71   Temp (!) 94.1 °F (34.5 °C)   Resp 18   Ht 5' 10" (1.778 m)   Wt 76 kg (167 lb 8.8 oz)   SpO2 (!) 93%   BMI 24.04 kg/m²     24H Vital Sign Range:  Temp:  [94.1 °F (34.5 °C)-97.6 °F (36.4 °C)]   Pulse:  [66-74]   Resp:  [12-20]   BP: ()/(51-76)   SpO2:  [92 %-98 %]     Level of Consciousness (AVPU): alert    Recent Labs     06/21/25  1546 06/21/25  1551 06/22/25  0605 06/23/25  0433   WBC 10.04  --  12.81* 10.74   HGB 7.4*  --  7.3* 7.4*   HCT 22.3* 25.8* 22.3* 22.5*     --  232 266       Recent Labs     06/21/25  0914 06/21/25  1546 06/22/25  0605 06/23/25  0433   * 132* 134* 131*   K 5.4* 4.8 5.1 5.4*   CL 99 97 97 94*   CO2 20* 25 25 25   BUN 62* 23 37* 54*   CREATININE 6.2* 3.1* 4.5* 5.9*    273* 101 103   PHOS 6.2*  --  5.4* 5.8*   MG  --   --   --  2.1        Recent Labs     06/21/25  1551 06/21/25  1945   PH 7.407 7.417   PCO2 46.8* 42.8   PO2 10.2* 23.6*   HCO3 29.4* 27.6   POCSATURATED 6.9* 32.8*        OXYGEN:  Flow (L/min) (Oxygen Therapy): 2  Oxygen Concentration (%): 21       MEWS score: 1    AI alert received. Rounding completed. Patient resting in bed, no acute distress noted. BP 84/59 (MAP 68). Most " recent temp 94.1. Recommend obtaining rectal temp and applying warming blanket if he remains hypothermic.     Notified Dr. Obrien of  alert.   Instructed to call [employer] Round[facility] at [phone] for further concerns or assistance.    Peyton VALENZUELA

## 2025-06-23 NOTE — PROCEDURES
"  Pre Op Diagnosis: thrombosed AV graft  Post Op Diagnosis: Same    Procedure: Declot of left arm graft    Procedure performed by: Gonsalo    Written Informed Consent Obtained: Yes  Specimen Removed: NO  Estimated Blood Loss: Minimal    Findings:   Successful declot and stent placement at the venous anastamosis. Central venous flow was very slow, however no stenosis was visualized centrally. Findings may reflect heart dysfunction.    Patient tolerated procedure well.    Francis Brush MD ([facility])  Interventional Radiology  [phone]      "

## 2025-06-23 NOTE — ASSESSMENT & PLAN NOTE
Anemia is likely due to chronic disease due to ESRD. Most recent hemoglobin and hematocrit are listed below.  Recent Labs     06/21/25  1546 06/21/25  1551 06/22/25  0605 06/23/25  0433   HGB 7.4*  --  7.3* 7.4*   HCT 22.3* 25.8* 22.3* 22.5*     Plan  - Monitor serial CBC: Daily  - Transfuse PRBC if patient becomes hemodynamically unstable, symptomatic or H/H drops below 7/21.  - Patient has received 1 units of PRBCs on 06/19.  - Patient's anemia is currently stable

## 2025-06-23 NOTE — SEDATION DOCUMENTATION
Procedure completed. Patient tolerated well; VSS. Site CDI. Patient to be transported to [facility] for recovery till 1237 then ok for transfer back to room if remains stable

## 2025-06-23 NOTE — PT/OT/SLP PROGRESS
Physical Therapy      Patient Name:  Domingo Gross   MRN:  [id number]    1231 -Patient not seen today secondary to Other (Comment) off unit in IR.  NSG reports pt may be going to dialysis when finished in IR. Will follow-up next tx date.

## 2025-06-23 NOTE — HOSPITAL COURSE
Mr. Gross was admitted for chest pain, he was started on heparin drip for NSTEMI and scheduled for [facility] on  but he had cardiac arrest that morning 3 times, he was intubated and monitored in the ICU but then was extubated to RA with good mental status, awake and alert, tolerating HD but his AVF has been clotting and declot was done on  and again on  with stent placement    Patient was on and off heparin drip for his afib, due to low H&H requiring blood transfusions twice.    Unfortunately on morning of , he had additional PEA arrest.  He was initially resuscitated, intubated, and moved to ICU where he was found to have large pericardial effusion causing tamponade.  He remained in cardiogenic shock and underwent emergent pericardiocentesis.  Initially, his pressor requirement improved slightly and he was started on CRRT due to hyperkalemia.  Unfortunately, while on CRRT he again developed bradycardia leading to cardiac arrest.  At that time he was transition to comfort measures and  peacefully with his wife at bedside

## 2025-06-23 NOTE — ASSESSMENT & PLAN NOTE
Patient's blood pressure range in the last 24 hours was: BP  Min: 83/57  Max: 115/69.The patient's inpatient anti-hypertensive regimen is listed below:  Current Antihypertensives  metoprolol succinate (TOPROL-XL) 24 hr tablet 50 mg, 2 times daily, Oral    Plan  - BP is controlled, no changes needed to their regimen  - holding antihypertensives in setting of arrest/shock

## 2025-06-23 NOTE — CONSULTS
Interventional Radiology  Consult/History & Physical Note      Reason for Consult: AVG clotted again    SUBJECTIVE:     Chief Complaint:  cardiac arrest    History of Present Illness:  Domingo Gross is a 65 y.o. male with a PMHx of ESRD on HD, PAD with multiple interventions, CAD (mid LAD/prox RCA PCI 3/2019 and prox LCA in 10/2019 s/p Successful declot of left arm AV graft on 6/21 who has been referred to IR for fistulogram and declot for management of vascular access problem. US on 6/22 which showed occlusive thrombus throughout the dialysis graft in the left upper extremity, extending into the outflow axillary vein.   Pt is afebrile and hemodynamically stable. He is on heparin drip, asa and plavix.  He has been NPO since midnight.    Review of Systems   Constitutional:  Negative for chills, fever, malaise/fatigue and weight loss.   Respiratory:  Negative for cough and shortness of breath.    Cardiovascular:  Negative for chest pain, palpitations and leg swelling.   Gastrointestinal:  Negative for abdominal pain, diarrhea, nausea and vomiting.   Genitourinary:  Negative for dysuria and flank pain.   Musculoskeletal:  Negative for back pain and myalgias.   Neurological:  Negative for weakness and headaches.       Scheduled Meds:   [START ON 6/27/2025] amiodarone  200 mg Oral Daily    amiodarone  400 mg Oral BID    aspirin  81 mg Oral Daily    atorvastatin  40 mg Oral QHS    clopidogreL  75 mg Oral Daily    folic acid  1 mg Oral Daily    glycerin adult  1 suppository Rectal Once    heparin (PORCINE)  80 Units/kg Intravenous Once    levothyroxine  75 mcg Oral Before breakfast    LIDOcaine  1 patch Transdermal Q24H    metoprolol succinate  50 mg Oral BID    perflutren protein-a microsphr  0.5 mL Intravenous Once    polyethylene glycol  17 g Oral Daily    sevelamer carbonate  1,600 mg Oral TID WM    sodium chloride 0.9%  500 mL Intravenous Once     Continuous Infusions:   heparin (porcine) in D5W  0-40 Units/kg/hr  Intravenous Continuous   Stopped at 06/21/25 1442     PRN Meds:  Current Facility-Administered Medications:     0.9%  NaCl infusion (for blood administration), , Intravenous, Q24H PRN    0.9%  NaCl infusion (for blood administration), , Intravenous, Q24H PRN    0.9%  NaCl infusion (for blood administration), , Intravenous, Q24H PRN    albuterol sulfate, 2.5 mg, Nebulization, Q4H PRN    dextrose 50%, 12.5 g, Intravenous, PRN    dextrose 50%, 25 g, Intravenous, PRN    glucagon (human recombinant), 1 mg, Intramuscular, PRN    glucose, 16 g, Oral, PRN    glucose, 24 g, Oral, PRN    heparin (porcine), 2,400 Units, Intra-Catheter, PRN    heparin (PORCINE), 60 Units/kg, Intravenous, PRN    heparin (PORCINE), 30 Units/kg, Intravenous, PRN    HYDROmorphone, 0.5 mg, Intravenous, Q4H PRN    naloxone, 0.02 mg, Intravenous, PRN    oxyCODONE, 5 mg, Oral, Q6H PRN    sodium chloride 0.9%, 10 mL, Intravenous, Q12H PRN    Review of patient's allergies indicates:   Allergen Reactions    Ace inhibitors Rash       Past Medical History:   Diagnosis Date    Acute congestive heart failure 09/13/2024    Allergy     Anemia     Anticoagulant long-term use     Arthritis     CKD (chronic kidney disease) stage 4, GFR 15-29 ml/min     Closed fracture of transverse process of lumbar vertebra 02/16/2024    COPD (chronic obstructive pulmonary disease) 08/20/2021    Coronary artery disease     Heart attack 10/04/2019    Hematuria     Hemothorax     Hyperlipidemia     Hypertension     Hyperuricemia     Hypocalcemia     Hypokalemia     Hypophosphatemia     Hypothyroidism 03/02/2023    PAD (peripheral artery disease)     Proteinuria     Vitamin D deficiency      Past Surgical History:   Procedure Laterality Date    ABDOMINAL AORTOGRAPHY N/A 5/10/2019    Procedure: AORTOGRAM-ABDOMINAL;  Surgeon: Keo Jenkins MD;  Location: [facility] CATH LAB/EP;  Service: Cardiology;  Laterality: N/A;  RSFA intervention     ANGIOGRAM, CORONARY, WITH LEFT HEART  CATHETERIZATION N/A 1/24/2025    Procedure: Angiogram, Coronary, with Left Heart Cath;  Surgeon: aVlente Moran MD;  Location: [facility] CATH LAB/EP;  Service: Cardiology;  Laterality: N/A;    AORTOGRAPHY WITH SERIALOGRAPHY N/A 12/3/2021    Procedure: AORTOGRAM, WITH SERIALOGRAPHY;  Surgeon: Keo Jenkins MD;  Location: [facility] CATH LAB/EP;  Service: Cardiology;  Laterality: N/A;    AORTOGRAPHY WITH SERIALOGRAPHY N/A 4/1/2022    Procedure: AORTOGRAM, WITH SERIALOGRAPHY;  Surgeon: Keo Jenkins MD;  Location: [facility] CATH LAB/EP;  Service: Cardiology;  Laterality: N/A;    ATHERECTOMY, CORONARY N/A 4/25/2023    Procedure: Atherectomy-coronary;  Surgeon: Keo Jenkins MD;  Location: [facility] CATH LAB/EP;  Service: Cardiology;  Laterality: N/A;    ATHERECTOMY, CORONARY N/A 1/24/2025    Procedure: Atherectomy-coronary;  Surgeon: Valente Moran MD;  Location: [facility] CATH LAB/EP;  Service: Cardiology;  Laterality: N/A;    BONE MARROW BIOPSY Right 10/12/2021    Procedure: BIOPSY-BONE MARROW;  Surgeon: Naseem Woods MD;  Location: [facility] OR;  Service: Oncology;  Laterality: Right;    CARDIAC CATHETERIZATION      CATARACT EXTRACTION      CATARACT EXTRACTION W/  INTRAOCULAR LENS IMPLANT Right 6/8/2020    Procedure: EXTRACTION, CATARACT, WITH IOL INSERTION;  Surgeon: Tin Light MD;  Location: [facility] OR;  Service: Ophthalmology;  Laterality: Right;    CATARACT EXTRACTION W/  INTRAOCULAR LENS IMPLANT Left 7/2/2020    Procedure: EXTRACTION, CATARACT, WITH IOL INSERTION;  Surgeon: Tin Light MD;  Location: [facility] OR;  Service: Ophthalmology;  Laterality: Left;    COLONOSCOPY N/A 1/6/2022    Procedure: COLONOSCOPY;  Surgeon: Kathe Penaloza MD;  [facility]  Service: Endoscopy;  Laterality: N/A;  COVID test at [facility] on 1/3-GT  okay to hold Plavix for 5 days and aspirin per Dr. Becerra  2nd floor due toextensive cardiac history   instructions mailed and my [facility] portal - sm    CORONARY ANGIOGRAPHY N/A  3/29/2019    Procedure: ANGIOGRAM, CORONARY ARTERY;  Surgeon: Keo Jenkins MD;  Location: [facility] CATH LAB/EP;  Service: Cardiology;  Laterality: N/A;    CORONARY ANGIOGRAPHY Left 10/11/2019    Procedure: ANGIOGRAM, CORONARY ARTERY;  Surgeon: Keo Jenkins MD;  Location: [facility] CATH LAB/EP;  Service: Cardiology;  Laterality: Left;    CORONARY ANGIOGRAPHY N/A 4/10/2023    Procedure: ANGIOGRAM, CORONARY ARTERY;  Surgeon: Keo Jenkins MD;  Location: [facility] CATH LAB/EP;  Service: Cardiology;  Laterality: N/A;    CORONARY ANGIOGRAPHY N/A 6/26/2024    Procedure: ANGIOGRAM, CORONARY ARTERY;  Surgeon: Enoch Tirado MD;  Location: [facility] CATH LAB/EP;  Service: Cardiology;  Laterality: N/A;    CORONARY ANGIOGRAPHY N/A 9/16/2024    Procedure: ANGIOGRAM, CORONARY ARTERY;  Surgeon: Enoch Tirado MD;  Location: [facility] CATH LAB/EP;  Service: Cardiology;  Laterality: N/A;    CORONARY ANGIOPLASTY WITH STENT PLACEMENT  03/29/2019    mid and distal RCA    CORONARY ANGIOPLASTY WITH STENT PLACEMENT N/A 1/24/2025    Procedure: ANGIOPLASTY, CORONARY ARTERY, WITH STENT INSERTION;  Surgeon: Valente Moran MD;  Location: [facility] CATH LAB/EP;  Service: Cardiology;  Laterality: N/A;    ESOPHAGOGASTRODUODENOSCOPY N/A 1/6/2022    Procedure: EGD (ESOPHAGOGASTRODUODENOSCOPY);  Surgeon: Kathe Penaloza MD;  Location: [address];  Service: Endoscopy;  Laterality: N/A;    EYE SURGERY      INSERTION OF TUNNELED CENTRAL VENOUS HEMODIALYSIS CATHETER N/A 2/9/2024    Procedure: INSERTION, CATHETER, HEMODIALYSIS, DUAL LUMEN;  Surgeon: Wang Mendieta MD;  Location: [facility] OR;  Service: General;  Laterality: N/A;    INSERTION, CENTRAL VENOUS ACCESS DEVICE N/A 6/20/2025    Procedure: Insertion,central venous access device;  Surgeon: Provider, [facility];  Location: [facility] OR;  Service: Radiology;  Laterality: N/A;    INSTANTANEOUS WAVE-FREE RATIO (IFR) N/A 4/25/2023    Procedure: Instantaneous Wave-Free Ratio (IFR);  Surgeon: Keo KYLE  MD Gregory;  Location: [facility] CATH LAB/EP;  Service: Cardiology;  Laterality: N/A;    INTRAVASCULAR ULTRASOUND, NON-CORONARY  8/12/2022    Procedure: Intravascular Ultrasound, Non-Coronary;  Surgeon: Keo Jenkins MD;  Location: [facility] CATH LAB/EP;  Service: Cardiology;;    IVUS, CORONARY  4/25/2023    Procedure: IVUS, Coronary;  Surgeon: Keo Jenkins MD;  Location: [facility] CATH LAB/EP;  Service: Cardiology;;    IVUS, CORONARY  5/16/2023    Procedure: IVUS, Coronary;  Surgeon: Keo Jenkins MD;  Location: [facility] CATH LAB/EP;  Service: Cardiology;;  CX    IVUS, CORONARY  1/24/2025    Procedure: IVUS, Coronary;  Surgeon: Valente Moran MD;  Location: [facility] CATH LAB/EP;  Service: Cardiology;;    LEFT HEART CATHETERIZATION N/A 3/29/2019    Procedure: Left heart cath;  Surgeon: Keo Jenkins MD;  Location: [facility] CATH LAB/EP;  Service: Cardiology;  Laterality: N/A;    LEFT HEART CATHETERIZATION Left 10/8/2019    Procedure: Left heart cath;  Surgeon: Keo Jenkins MD;  Location: [facility] CATH LAB/EP;  Service: Cardiology;  Laterality: Left;    LEFT HEART CATHETERIZATION Left 4/10/2023    Procedure: Left heart cath;  Surgeon: Keo Jenkins MD;  Location: [facility] CATH LAB/EP;  Service: Cardiology;  Laterality: Left;    LEFT HEART CATHETERIZATION Left 4/25/2023    Procedure: Left heart cath;  Surgeon: Keo Jenkins MD;  Location: [facility] CATH LAB/EP;  Service: Cardiology;  Laterality: Left;    LEFT HEART CATHETERIZATION Left 5/16/2023    Procedure: Left heart cath;  Surgeon: Keo Jenkins MD;  Location: [facility] CATH LAB/EP;  Service: Cardiology;  Laterality: Left;    LEFT HEART CATHETERIZATION Left 6/26/2024    Procedure: Left heart cath;  Surgeon: Enoch Tirado MD;  Location: [facility] CATH LAB/EP;  Service: Cardiology;  Laterality: Left;    LEFT HEART CATHETERIZATION Left 9/16/2024    Procedure: Left heart cath;  Surgeon: Enoch Tirado MD;  Location: [facility] CATH LAB/EP;  Service: Cardiology;  Laterality: Left;     LITHOTRIPSY, CORONARY TRANSLUMINAL, PERCUTANEOUS  9/16/2024    Procedure: LITHOTRIPSY, CORONARY TRANSLUMINAL, PERCUTANEOUS;  Surgeon: Enoch Tirado MD;  Location: [facility] CATH LAB/EP;  Service: Cardiology;;    PERCUTANEOUS CORONARY INTERVENTION, ARTERY N/A 6/26/2024    Procedure: Percutaneous coronary intervention;  Surgeon: Enoch Tirado MD;  Location: [facility] CATH LAB/EP;  Service: Cardiology;  Laterality: N/A;    PERCUTANEOUS TRANSLUMINAL ANGIOPLASTY (PTA) OF PERIPHERAL VESSEL N/A 7/12/2019    Procedure: PTA, PERIPHERAL VESSEL;  Surgeon: Keo Jenkins MD;  Location: [facility] CATH LAB/EP;  Service: Cardiology;  Laterality: N/A;    PERCUTANEOUS TRANSLUMINAL ANGIOPLASTY (PTA) OF PERIPHERAL VESSEL Left 5/20/2022    Procedure: PTA, PERIPHERAL VESSEL;  Surgeon: Keo Jenkins MD;  Location: [facility] CATH LAB/EP;  Service: Cardiology;  Laterality: Left;    PERCUTANEOUS TRANSLUMINAL ANGIOPLASTY (PTA) OF PERIPHERAL VESSEL Right 8/12/2022    Procedure: PTA, PERIPHERAL VESSEL;  Surgeon: Keo Jenkins MD;  Location: [facility] CATH LAB/EP;  Service: Cardiology;  Laterality: Right;    PERCUTANEOUS TRANSLUMINAL BALLOON ANGIOPLASTY OF CORONARY ARTERY  4/25/2023    Procedure: Angioplasty-coronary;  Surgeon: Keo Jenkins MD;  Location: [facility] CATH LAB/EP;  Service: Cardiology;;    PLACEMENT OF ARTERIOVENOUS GRAFT Left 4/15/2024    Procedure: INSERTION, GRAFT, ARTERIOVENOUS;  Surgeon: Scott Pascual MD;  Location: [facility] OR;  Service: General;  Laterality: Left;    PTCA, SINGLE VESSEL  5/16/2023    Procedure: PTCA, Single Vessel;  Surgeon: Keo Jenkins MD;  Location: [facility] CATH LAB/EP;  Service: Cardiology;;  CX    PTCA, SINGLE VESSEL  6/26/2024    Procedure: PTCA, Single Vessel;  Surgeon: Enoch Tirado MD;  Location: [facility] CATH LAB/EP;  Service: Cardiology;;    PTCA, SINGLE VESSEL Left 9/16/2024    Procedure: PTCA, Single Vessel;  Surgeon: Enoch Tirado MD;  Location: [facility] CATH LAB/EP;  Service:  Cardiology;  Laterality: Left;    REMOVAL OF HEMODIALYSIS CATHETER Right 2/9/2024    Procedure: REMOVAL, CATHETER, HEMODIALYSIS;  Surgeon: Wang Mendieta MD;  Location: [facility] OR;  Service: General;  Laterality: Right;    REMOVAL OF TUNNELED CENTRAL VENOUS CATHETER (CVC) Right 5/20/2024    Procedure: REMOVAL, CATHETER, CENTRAL VENOUS, TUNNELED;  Surgeon: Scott Pascual MD;  Location: [facility] OR;  Service: General;  Laterality: Right;    REPAIR, CHRONIC TOTAL OCCLUSION, CORONARY  6/26/2024    Procedure: Repair, Chronic Total Occlusion, Coronary;  Surgeon: Enoch Tirado MD;  Location: [facility] CATH LAB/EP;  Service: Cardiology;;    STENT, DRUG ELUTING, SINGLE VESSEL, CORONARY  4/25/2023    Procedure: Stent, Drug Eluting, Single Vessel, Coronary;  Surgeon: Keo Jenkins MD;  Location: [facility] CATH LAB/EP;  Service: Cardiology;;    STENT, DRUG ELUTING, SINGLE VESSEL, CORONARY  5/16/2023    Procedure: Stent, Drug Eluting, Single Vessel, Coronary;  Surgeon: Keo Jenkins MD;  Location: [facility] CATH LAB/EP;  Service: Cardiology;;  CX    TRANSESOPHAGEAL ECHOCARDIOGRAM WITH POSSIBLE CARDIOVERSION (JOSE W/ POSS CARDIOVERSION) N/A 6/19/2023    Procedure: Transesophageal echo (JOSE) intra-procedure log documentation;  Surgeon: Keo Jenkins MD;  Location: [facility] CATH LAB/EP;  Service: Cardiology;  Laterality: N/A;     Family History   Problem Relation Name Age of Onset    Aneurysm Mother      Hypertension Mother      Heart disease Mother      Cancer Father      Diabetes Sister 1     Hypertension Sister 1     No Known Problems Brother 1     No Known Problems Son 1      Social History[1]    OBJECTIVE:     Vital Signs (Most Recent)  Temp: 97 °F (36.1 °C) (06/23/25 0355)  Pulse: 68 (06/23/25 0725)  Resp: 18 (06/23/25 0725)  BP: 102/70 (06/23/25 0725)  SpO2: (!) 92 % (06/23/25 0842)    Physical Exam:  Physical Exam  Vitals and nursing note reviewed.   Constitutional:       Appearance: Normal appearance.   HENT:      Head:  Normocephalic and atraumatic.   Eyes:      General: No scleral icterus.     Extraocular Movements: Extraocular movements intact.   Cardiovascular:      Rate and Rhythm: Normal rate.      Comments: L graft with no thrill or bruit  Pulmonary:      Effort: Pulmonary effort is normal.      Comments: Off bipap on RA  Musculoskeletal:         General: Normal range of motion.      Cervical back: Normal range of motion.   Skin:     General: Skin is warm and dry.      Coloration: Skin is not jaundiced.   Neurological:      Mental Status: He is alert and oriented to person, place, and time.   Psychiatric:         Mood and Affect: Mood normal.         Behavior: Behavior normal.         Thought Content: Thought content normal.         Judgment: Judgment normal.         Laboratory  I have reviewed all pertinent lab results within the past 24 hours.  CBC:   Recent Labs   Lab 06/23/25  0433   WBC 10.74   RBC 2.54*   HGB 7.4*   HCT 22.5*      MCV 89   MCH 29.1   MCHC 32.9     CMP:   Recent Labs   Lab 06/16/25  1605 06/17/25  0401 06/23/25  0433   *   < > 103   CALCIUM 8.5*   < > 7.7*   ALBUMIN 2.1*   < > 2.2*   PROT 6.5  --   --    *   < > 131*   K 4.2   < > 5.4*   CO2 19*   < > 25   CL 98   < > 94*   BUN 79*   < > 54*   CREATININE 6.6*   < > 5.9*   ALKPHOS 73  --   --    ALT 15  --   --    AST 15  --   --    BILITOT 1.0  --   --     < > = values in this interval not displayed.     Coagulation:   Recent Labs   Lab 06/20/25  1957 06/21/25  0317 06/21/25  0914   INR 1.1  --   --    APTT 29.1   < > 51.7*    < > = values in this interval not displayed.       ASA/Mallampati  ASA: 3  Mallampati: 3    Imaging:  Recent imaging studies including US on 6/22 which was independently reviewed by Dr Brush and Shelby Nunez PA-C noting Occlusive thrombus throughout the dialysis graft in the left upper extremity, extending into the outflow axillary vein.     ASSESSMENT/PLAN:     Assessment:  65 y.o. male with a PMHx of ESRD on  HD, PAD with multiple interventions, CAD (mid LAD/prox RCA PCI 3/2019 and prox LCA in 10/2019 s/p Successful declot of left arm AV graft on  who has been referred to IR for fistulogram and declot for management of vascular access problem.  Pt sent to dialysis today and access could not be obtained. The procedure was discussed in great detail with the patient including thorough explanations of the potential risks and benefits of fistulogram and declot. Risks include but are not limited to bleeding or infection at the puncture site, internal bleeding, allergic reaction to contrast used during the procedure, damage to the graft and surrounding structures, and need for additional procedures. The patient is a candidate for IR fistulogram and declot under moderate sedation. Plan discussed with ordering physician and pt who verbalized understanding of the plan and would like to proceed.    Plan:  Will proceed with IR fistulogram and declot under moderate sedation on .   Please keep pt NPO  Anticoagulation history reviewed.   Coagulation labs reviewed. INR 1.1 on   Thank you for the consult. Please contact with questions via [employer] secure chat or spectra.    Time spent during patient care today was 75-89 minutes. This includes time spent before the visit reviewing the chart, discussing case with staff physician and ordering provider, time spent during the face to face patient visit, and time spent after the visit on documentation. Time excludes procedure time.     Shelby Zazueta PA-C  Interventional Radiology        [1]   Social History  Tobacco Use    Smoking status: Former     Current packs/day: 0.00     Types: Cigarettes     Quit date: 1999     Years since quittin.1     Passive exposure: Never    Smokeless tobacco: Never   Substance Use Topics    Alcohol use: No     Alcohol/week: 0.0 standard drinks of alcohol    Drug use: No

## 2025-06-23 NOTE — PT/OT/SLP PROGRESS
Occupational Therapy      Patient Name:  Dominog Gross   MRN:  [id number]    Patient not seen today secondary to  (off the floor for dialysis and then to Interventional Radiology). Will follow-up as able.    6/23/2025

## 2025-06-23 NOTE — SUBJECTIVE & OBJECTIVE
Interval History:   I have Seen and examined the patient today  Patient went for HD today but his AVF was clotted again so he went for declot and stent placement by IR and then went to HD afterwards, when he came back to his room he was hypotensive and hypothermic but awake and alert     Review of Systems   Constitutional: Negative.    HENT: Negative.     Respiratory: Negative.     Cardiovascular: Negative.    Genitourinary: Negative.    Musculoskeletal: Negative.    Skin: Negative.    Neurological: Negative.    Psychiatric/Behavioral: Negative.       Objective:     Vital Signs (Most Recent):  Temp: (!) 94.1 °F (34.5 °C) (06/23/25 1601)  Pulse: 71 (06/23/25 1633)  Resp: 18 (06/23/25 1633)  BP: (!) 84/59 (06/23/25 1633)  SpO2: (!) 93 % (06/23/25 1633) Vital Signs (24h Range):  Temp:  [94.1 °F (34.5 °C)-97.6 °F (36.4 °C)] 94.1 °F (34.5 °C)  Pulse:  [66-74] 71  Resp:  [12-20] 18  SpO2:  [92 %-98 %] 93 %  BP: ()/(51-76) 84/59     Weight: 76 kg (167 lb 8.8 oz)  Body mass index is 24.04 kg/m².    Intake/Output Summary (Last 24 hours) at 6/23/2025 1719  Last data filed at 6/23/2025 1345  Gross per 24 hour   Intake 495 ml   Output 250 ml   Net 245 ml         Physical Exam  Constitutional:       Appearance: He is ill-appearing.   HENT:      Head: Normocephalic and atraumatic.   Cardiovascular:      Rate and Rhythm: Normal rate.   Pulmonary:      Effort: Pulmonary effort is normal.      Breath sounds: Normal breath sounds.   Skin:     General: Skin is warm.   Neurological:      General: No focal deficit present.      Mental Status: He is alert. Mental status is at baseline.   Psychiatric:         Mood and Affect: Mood normal.         Behavior: Behavior normal.               Significant Labs: All pertinent labs within the past 24 hours have been reviewed.    Significant Imaging: I have reviewed all pertinent imaging results/findings within the past 24 hours.

## 2025-06-23 NOTE — ASSESSMENT & PLAN NOTE
Patient was not able to get dialysis 06/20 because of inability to access his AV fistula  Temp cath was placed and patient got HD 06/20 -21 , cath removed 06/22   AVF declot by IR 06/21 and another declot and stent placement on 06/23

## 2025-06-23 NOTE — PLAN OF CARE
Problem: Adult Inpatient Plan of Care  Goal: Plan of Care Review  6/23/2025 0750 by Philip Luke RN  Outcome: Progressing  6/23/2025 0750 by Philip Luke RN  Outcome: Progressing  Goal: Patient-Specific Goal (Individualized)  6/23/2025 0750 by Philip Luke RN  Outcome: Progressing  6/23/2025 0750 by Philip Luke RN  Outcome: Progressing  Goal: Absence of Hospital-Acquired Illness or Injury  6/23/2025 0750 by Philip Luke RN  Outcome: Progressing  6/23/2025 0750 by Philip Luke RN  Outcome: Progressing  Goal: Optimal Comfort and Wellbeing  6/23/2025 0750 by Philip Luke RN  Outcome: Progressing  6/23/2025 0750 by Philip Luke RN  Outcome: Progressing  Goal: Readiness for Transition of Care  6/23/2025 0750 by Philip Luke RN  Outcome: Progressing  6/23/2025 0750 by Philip Luke RN  Outcome: Progressing     Problem: Wound  Goal: Optimal Coping  6/23/2025 0750 by Philip Luke RN  Outcome: Progressing  6/23/2025 0750 by Philip Luke RN  Outcome: Progressing  Goal: Optimal Functional Ability  6/23/2025 0750 by Philip Luke RN  Outcome: Progressing  6/23/2025 0750 by Philip Luke RN  Outcome: Progressing  Goal: Absence of Infection Signs and Symptoms  6/23/2025 0750 by Philip Luke RN  Outcome: Progressing  6/23/2025 0750 by Philip Luke RN  Outcome: Progressing  Goal: Improved Oral Intake  6/23/2025 0750 by Philip Luke RN  Outcome: Progressing  6/23/2025 0750 by Philip Luke RN  Outcome: Progressing  Goal: Optimal Pain Control and Function  6/23/2025 0750 by Philip Luke RN  Outcome: Progressing  6/23/2025 0750 by Philip Luke RN  Outcome: Progressing  Goal: Skin Health and Integrity  6/23/2025 0750 by Philip Luke RN  Outcome: Progressing  6/23/2025 0750 by Philip Luke RN  Outcome: Progressing  Goal: Optimal Wound Healing  6/23/2025 0750 by Philip Luke, RN  Outcome: Progressing  6/23/2025 0750 by Philip Luke, RN  Outcome: Progressing     Problem: Fall Injury Risk  Goal:  Absence of Fall and Fall-Related Injury  6/23/2025 0750 by Philip Luke RN  Outcome: Progressing  6/23/2025 0750 by Philip Luke RN  Outcome: Progressing     Problem: Comorbidity Management  Goal: Blood Pressure in Desired Range  6/23/2025 0750 by Philip Luke RN  Outcome: Progressing  6/23/2025 0750 by Philip Luke RN  Outcome: Progressing     Problem: Hemodialysis  Goal: Safe, Effective Therapy Delivery  6/23/2025 0750 by Philip Luke RN  Outcome: Progressing  6/23/2025 0750 by Philip Luke RN  Outcome: Progressing  Goal: Effective Tissue Perfusion  6/23/2025 0750 by Philip Luke RN  Outcome: Progressing  6/23/2025 0750 by Philip Luke RN  Outcome: Progressing  Goal: Absence of Infection Signs and Symptoms  6/23/2025 0750 by Philip Luke RN  Outcome: Progressing  6/23/2025 0750 by Philip Luke RN  Outcome: Progressing     Problem: Infection  Goal: Absence of Infection Signs and Symptoms  6/23/2025 0750 by Philip Luke RN  Outcome: Progressing  6/23/2025 0750 by Philip Luke RN  Outcome: Progressing     Problem: Skin Injury Risk Increased  Goal: Skin Health and Integrity  6/23/2025 0750 by Philip Luke RN  Outcome: Progressing  6/23/2025 0750 by Philip Luke RN  Outcome: Progressing     Problem: Delirium  Goal: Optimal Coping  6/23/2025 0750 by Philip Luke RN  Outcome: Progressing  6/23/2025 0750 by Philip Luke RN  Outcome: Progressing  Goal: Improved Behavioral Control  6/23/2025 0750 by Philip Luke RN  Outcome: Progressing  6/23/2025 0750 by Philip Luke RN  Outcome: Progressing  Goal: Improved Attention and Thought Clarity  6/23/2025 0750 by Philip Luke RN  Outcome: Progressing  6/23/2025 0750 by Philip Luke RN  Outcome: Progressing  Goal: Improved Sleep  6/23/2025 0750 by Philip Luke RN  Outcome: Progressing  6/23/2025 0750 by Marly, Philip, RN  Outcome: Progressing     Problem: Mechanical Ventilation Invasive  Goal: Effective Communication  6/23/2025 0750 by  Marly, Philip, RN  Outcome: Progressing  6/23/2025 0750 by Philip Luke RN  Outcome: Progressing  Goal: Optimal Device Function  6/23/2025 0750 by Philip Luke RN  Outcome: Progressing  6/23/2025 0750 by Philip Luke RN  Outcome: Progressing  Goal: Optimal Nutrition Delivery  6/23/2025 0750 by Philip Luke RN  Outcome: Progressing  6/23/2025 0750 by Philip Luke RN  Outcome: Progressing  Goal: Absence of Device-Related Skin and Tissue Injury  6/23/2025 0750 by Philip Luke RN  Outcome: Progressing  6/23/2025 0750 by Philip Luke RN  Outcome: Progressing  Goal: Absence of Ventilator-Induced Lung Injury  6/23/2025 0750 by Philip Luke RN  Outcome: Progressing  6/23/2025 0750 by Philip Luke RN  Outcome: Progressing     Problem: Dysrhythmia  Goal: Normalized Cardiac Rhythm  6/23/2025 0750 by Philip Luke RN  Outcome: Progressing  6/23/2025 0750 by Philip Luke RN  Outcome: Progressing

## 2025-06-23 NOTE — PROGRESS NOTES
[facility] Medicine  Progress Note    Patient Name: Domingo Gross  MRN: [id number]  Patient Class: IP- Inpatient   Admission Date: 6/7/2025  Length of Stay: 16 days  Attending Physician: Nima Ervin MD  Primary Care Provider: Geeta Coffey MD        Subjective     Principal Problem:Cardiac arrest        HPI:  64 y.o. male with PMH ESRD on HD, PAD with multiple interventions, CAD (mid LAD/prox RCA PCI 3/2019 and prox LCA in 10/2019 following out of hospital cardiac arrest), after cardiac arrest in setting of prox LCX occlusion treated with PCI, 9/16/2024 s/p intervention w cutting/shockwave PTCA to Lcx, 01/24/25 PCI of OM, new reduction in EF presents to ER with substernal chest pain that radiates to his throat. Report his chest pain feels like heaviness as if something is heavy sitting on his chest, started last night at rest, he took sublingual nitro which only slightly helped but he was still feeling it, today as he was having HD the pain got worse so he came to the ED.    Patient denies smoking or alcohol use        Overview/Hospital Course:  No notes on file    Interval History:   I have Seen and examined the patient today  Patient went for HD today but his AVF was clotted again so he went for declot and stent placement by IR and then went to HD afterwards, when he came back to his room he was hypotensive and hypothermic but awake and alert     Review of Systems   Constitutional: Negative.    HENT: Negative.     Respiratory: Negative.     Cardiovascular: Negative.    Genitourinary: Negative.    Musculoskeletal: Negative.    Skin: Negative.    Neurological: Negative.    Psychiatric/Behavioral: Negative.       Objective:     Vital Signs (Most Recent):  Temp: (!) 94.1 °F (34.5 °C) (06/23/25 1601)  Pulse: 71 (06/23/25 1633)  Resp: 18 (06/23/25 1633)  BP: (!) 84/59 (06/23/25 1633)  SpO2: (!) 93 % (06/23/25 1633) Vital Signs (24h Range):  Temp:  [94.1 °F (34.5 °C)-97.6 °F (36.4 °C)]  94.1 °F (34.5 °C)  Pulse:  [66-74] 71  Resp:  [12-20] 18  SpO2:  [92 %-98 %] 93 %  BP: ()/(51-76) 84/59     Weight: 76 kg (167 lb 8.8 oz)  Body mass index is 24.04 kg/m².    Intake/Output Summary (Last 24 hours) at 6/23/2025 1719  Last data filed at 6/23/2025 1345  Gross per 24 hour   Intake 495 ml   Output 250 ml   Net 245 ml         Physical Exam  Constitutional:       Appearance: He is ill-appearing.   HENT:      Head: Normocephalic and atraumatic.   Cardiovascular:      Rate and Rhythm: Normal rate.   Pulmonary:      Effort: Pulmonary effort is normal.      Breath sounds: Normal breath sounds.   Skin:     General: Skin is warm.   Neurological:      General: No focal deficit present.      Mental Status: He is alert. Mental status is at baseline.   Psychiatric:         Mood and Affect: Mood normal.         Behavior: Behavior normal.               Significant Labs: All pertinent labs within the past 24 hours have been reviewed.    Significant Imaging: I have reviewed all pertinent imaging results/findings within the past 24 hours.      Assessment & Plan  Cardiac arrest  Patient had cardiac arrest requiring ACLS x3 on 6/9  Remarkable improvement  - extubated on 06/11.  Tolerating well  - Cardiology following    NSTEMI (non-ST elevated myocardial infarction)  History of MI (myocardial infarction)  Patient with chest pain worsened while on HD  Trops are flat 0.14  EKG with no ischemic changes  Echo with worsening wall motion as per cardio  Seen by cardiology, plan was for Left heart cath 06/09 but patient had a cardiac arrest that morning; managing medically for now  - continue ASA/plavix/statin  - Cardiology following      Patient with chest pain worsened while on HD  Trops are flat 0.14  EKG with no ischemic changes  Echo with worsening wall motion as per cardio  Seen by cardiology, plan was for Left heart cath 06/09 but patient had a cardiac arrest that morning; managing medically for now  - continue  ASA/plavix/statin  - Cardiology following  HFrEF (heart failure with reduced ejection fraction)  - likely ischemic  - volume control with dialysis    Repeat echo showed   Left Ventricle: The left ventricle is moderately dilated. Mildly increased wall thickness. There is eccentric hypertrophy. Regional wall motion abnormalities present. See diagram for wall motion findings. There is moderately reduced systolic function with a visually estimated ejection fraction of 35 - 40%. Quantitated ejection fraction is 38%. Unable to assess diastolic function due to poor image quality.    Right Ventricle: The right ventricle is normal in size Systolic function is normal. TAPSE is 2.3 cm.    Overall the study quality was technically difficult.  Paroxysmal atrial fibrillation  Patient has long standing persistent (>12 months) atrial fibrillation. Patient is currently in sinus rhythm. [id number] Score: 1. The patients heart rate in the last 24 hours is as follows:  Pulse  Min: 66  Max: 74     Antiarrhythmics  amiodarone tablet 400 mg, 2 times daily, Oral  amiodarone tablet 200 mg, Daily, Oral  metoprolol succinate (TOPROL-XL) 24 hr tablet 50 mg, 2 times daily, Oral    Anticoagulants  heparin 25,000 units in dextrose 5% (100 units/ml) IV bolus from bag HIGH INTENSITY nomogram - [facility], Once, Intravenous  heparin 25,000 units in dextrose 5% 250 mL (100 units/mL) infusion HIGH INTENSITY nomogram - [facility], Continuous, Intravenous  heparin 25,000 units in dextrose 5% (100 units/ml) IV bolus from bag HIGH INTENSITY nomogram - [facility], As needed (PRN), Intravenous  heparin 25,000 units in dextrose 5% (100 units/ml) IV bolus from bag HIGH INTENSITY nomogram - [facility], As needed (PRN), Intravenous  heparin (porcine) injection 2,400 Units, As needed (PRN), Intra-Catheter    Plan  - Replete lytes with a goal of K>4, Mg >2  - Patient is anticoagulated, see medications listed above.  - Patient's afib is currently controlled  -not on AC due to low H&H  requiring transfusions     ESRD (end stage renal disease)   >>ASSESSMENT AND PLAN FOR ESRD (END STAGE RENAL DISEASE) WRITTEN ON 6/7/2025  5:21 PM BY ERICH FRAZIER MD    Creatine stable for now. BMP reviewed- noted Estimated Creatinine Clearance: 13.1 mL/min (A) (based on SCr of 5.9 mg/dL (H)). according to latest data. Based on current GFR, CKD stage is end stage.  Monitor UOP and serial BMP and adjust therapy as needed. Renally dose meds. Avoid nephrotoxic medications and procedures.    ESRD On HD,  Primary hypertension  Patient's blood pressure range in the last 24 hours was: BP  Min: 83/57  Max: 115/69.The patient's inpatient anti-hypertensive regimen is listed below:  Current Antihypertensives  metoprolol succinate (TOPROL-XL) 24 hr tablet 50 mg, 2 times daily, Oral    Plan  - BP is controlled, no changes needed to their regimen  - holding antihypertensives in setting of arrest/shock    Hyperlipidemia  -Continue statin  Anemia due to chronic kidney disease, on chronic dialysis  Anemia is likely due to chronic disease due to ESRD. Most recent hemoglobin and hematocrit are listed below.  Recent Labs     06/21/25  1546 06/21/25  1551 06/22/25  0605 06/23/25  0433   HGB 7.4*  --  7.3* 7.4*   HCT 22.3* 25.8* 22.3* 22.5*     Plan  - Monitor serial CBC: Daily  - Transfuse PRBC if patient becomes hemodynamically unstable, symptomatic or H/H drops below 7/21.  - Patient has received 1 units of PRBCs on 06/19.  - Patient's anemia is currently stable    Hypothyroidism  Cont levothyroxine     Atherosclerosis of native coronary artery of native heart with unstable angina pectoris  Patient with known CAD s/p stent placement, which is controlled Will continue ASA, Plavix, and Statin and monitor for S/Sx of angina/ACS. Continue to monitor on telemetry.   Prolonged Q-T interval on ECG  -continue to monitor    AV fistula thrombosis  Patient was not able to get dialysis 06/20 because of inability to access his AV  fistula  Temp cath was placed and patient got HD 06/20 -21 , cath removed 06/22   AVF declot by IR 06/21 and another declot and stent placement on 06/23    VTE Risk Mitigation (From admission, onward)           Ordered     heparin (porcine) injection 2,400 Units  As needed (PRN)         06/20/25 1729     heparin 25,000 units in dextrose 5% (100 units/ml) IV bolus from bag HIGH INTENSITY nomogram - [facility]  As needed (PRN)        Question:  Heparin Infusion Adjustment (DO NOT MODIFY ANSWER)  Answer:  \\[email or web address]\epic\Images\Pharmacy\HeparinInfusions\heparin HIGH INTENSITY nomogram for [facility] [id number]    06/20/25 1616     heparin 25,000 units in dextrose 5% (100 units/ml) IV bolus from bag HIGH INTENSITY nomogram - [facility]  As needed (PRN)        Question:  Heparin Infusion Adjustment (DO NOT MODIFY ANSWER)  Answer:  \\[email or web address]\epic\Images\Pharmacy\HeparinInfusions\heparin HIGH INTENSITY nomogram for [facility] [id number].pdf    06/20/25 1616     heparin 25,000 units in dextrose 5% (100 units/ml) IV bolus from bag HIGH INTENSITY nomogram - [facility]  Once        Question:  Heparin Infusion Adjustment (DO NOT MODIFY ANSWER)  Answer:  \\[email or web address]\epic\Images\Pharmacy\HeparinInfusions\heparin HIGH INTENSITY nomogram for [facility] [id number].pdf    06/20/25 1616     heparin 25,000 units in dextrose 5% 250 mL (100 units/mL) infusion HIGH INTENSITY nomogram - [facility]  Continuous        Question:  Begin at (units/kg/hr)  Answer:  18    06/20/25 1616     IP VTE HIGH RISK PATIENT  Once         06/07/25 1155     Place sequential compression device  Until discontinued         06/07/25 1155                    Discharge Planning   FLACO: 6/24/2025     Code Status: Full Code   Medical Readiness for Discharge Date: 6/20/2025  Discharge Plan A: [facility]hab ([facility])   Discharge Delays:  (pending medical stability)              Please place Justification for DME      Nima Ervin MD  Department of Hospital Medicine   [facility]

## 2025-06-24 PROBLEM — I31.4 PERICARDIAL EFFUSION WITH CARDIAC TAMPONADE: Status: ACTIVE | Noted: 2025-01-01

## 2025-06-24 PROBLEM — I31.4 PERICARDIAL TAMPONADE: Status: ACTIVE | Noted: 2025-01-01

## 2025-06-24 PROBLEM — R57.9 SHOCK: Status: ACTIVE | Noted: 2025-01-01

## 2025-06-24 PROBLEM — I31.39 PERICARDIAL EFFUSION WITH CARDIAC TAMPONADE: Status: ACTIVE | Noted: 2025-01-01

## 2025-06-24 PROBLEM — Z71.89 GOALS OF CARE, COUNSELING/DISCUSSION: Status: ACTIVE | Noted: 2025-01-01

## 2025-06-24 PROBLEM — K72.00 SHOCK LIVER: Status: ACTIVE | Noted: 2025-01-01

## 2025-06-24 PROBLEM — Z51.5 COMFORT MEASURES ONLY STATUS: Status: ACTIVE | Noted: 2025-01-01

## 2025-06-24 NOTE — SUBJECTIVE & OBJECTIVE
Interval History: Code Blue this am at approx 6:45am. Initially PEA arrest. Multiple rounds of calcium, bicarb, epinephrine; briefly attained ROSC but rhythm devolved to Vfib s/p shock with 200J and subsequent ROSC. Noted on POCUS to have large pericardial effusion with concern for tamponade, s/p bedside pericardiocentesis. Initially with high pressor requirements which have slightly improved at this time.  Discussed with wife; he is now DNR but will observe clinical course on CRRT for time-limited trial supportive therapy.  If further clinical decompensation, transition to comfort measures.    Review of Systems  Objective:     Vital Signs (Most Recent):  Temp: (!) 93.7 °F (34.3 °C) (06/24/25 1105)  Pulse: 89 (06/24/25 1115)  Resp: (!) 39 (06/24/25 1115)  BP: (!) 55/36 (06/24/25 1115)  SpO2: 100 % (06/24/25 1115) Vital Signs (24h Range):  Temp:  [93.7 °F (34.3 °C)-96.8 °F (36 °C)] 93.7 °F (34.3 °C)  Pulse:  [] 89  Resp:  [12-39] 39  SpO2:  [60 %-100 %] 100 %  BP: ()/() 55/36  Arterial Line BP: ()/(42-91) 137/64     Weight: 75.8 kg (167 lb)  Body mass index is 23.96 kg/m².    Intake/Output Summary (Last 24 hours) at 6/24/2025 1155  Last data filed at 6/24/2025 0942  Gross per 24 hour   Intake 1681.15 ml   Output 1650 ml   Net 31.15 ml         Physical Exam  Constitutional:       Appearance: He is toxic-appearing.      Interventions: He is sedated, intubated and restrained.   Cardiovascular:      Comments: Flail chest.  Pericardial drain in place  Pulmonary:      Effort: He is intubated.   Musculoskeletal:      Comments: Bruising on right flank               Significant Labs: All pertinent labs within the past 24 hours have been reviewed.    Significant Imaging: I have reviewed all pertinent imaging results/findings within the past 24 hours.

## 2025-06-24 NOTE — ASSESSMENT & PLAN NOTE
Anemia is likely due to chronic disease due to ESRD. Most recent hemoglobin and hematocrit are listed below.  Recent Labs     06/23/25  0433 06/24/25  0441 06/24/25  0720 06/24/25  0730 06/24/25  0823   HGB 7.4* 7.1*  --  7.5*  --    HCT 22.5* 22.8* 24.2* 24.2* 21.8*     Plan  - Monitor serial CBC: Daily  - Transfuse PRBC if patient becomes hemodynamically unstable, symptomatic or H/H drops below 7/21.  - Patient has received 1 units of PRBCs on 06/19.  - Patient's anemia is currently stable

## 2025-06-24 NOTE — ASSESSMENT & PLAN NOTE
Patient has long standing persistent (>12 months) atrial fibrillation. Patient is currently in sinus rhythm. [id number] Score: 1. The patients heart rate in the last 24 hours is as follows:  Pulse  Min: 60  Max: 122     Antiarrhythmics  amiodarone tablet 400 mg, 2 times daily, Oral  amiodarone tablet 200 mg, Daily, Oral    Anticoagulants  heparin (porcine) injection 2,400 Units, As needed (PRN), Intra-Catheter    Plan  - Replete lytes with a goal of K>4, Mg >2  - Patient is not anticoagulated due to bleeding  - Patient's afib is currently controlled  -not on AC due to low H&H requiring transfusions

## 2025-06-24 NOTE — ASSESSMENT & PLAN NOTE
This patient has shock. The type of shock is cardiogenic due to ischemic. The patient has the following evidence of shock: persistent hypotension and BRETT. The patient will be admitted to an intensive care unit  -cardiogenic shock following cardiac arrest this morning with cardiac tamponade   -status post pericardial drain with removal of 1.4 L bloody fluid  -still requiring multiple pressors at this time although requirements are slightly decreasing  -CRRT for continued management of electrolytes  -cardiology following, janice wallace

## 2025-06-24 NOTE — PROCEDURES
"Domingo Gross is a 65 y.o. male patient.    Temp: (!) 93.7 °F (34.3 °C) (06/24/25 1105)  Pulse: (!) 0 (06/24/25 1320)  Resp: 12 (06/24/25 1320)  BP: (!) 68/51 (06/24/25 1303)  SpO2: 100 % (06/24/25 1303)  Weight: 75.8 kg (167 lb) (06/24/25 0859)  Height: 5' 10" (177.8 cm) (06/24/25 0859)       Central Line    Date/Time: 6/24/2025 6:29 PM    Performed by: Sarah Nieto MD  Authorized by: Celso Do MD    Location procedure was performed:  [facility] ICU 5TH FLOOR  Assisting Provider:  Jason Rodriguez MD  Pre-operative diagnosis:  Cardiac Arrest  Post-operative diagnosis:  Cardiac Arrest  Consent Done ?:  Emergent Situation  Time out complete?: Verified correct patient, procedure, equipment, staff, and site/side    Indications:  Med administration and hemodialysis  Preparation:  Skin prepped with ChloraPrep  Skin prep agent dried: Skin prep agent completely dried prior to procedure    Sterile barriers: All five maximal sterile barriers used - gloves, gown, cap, mask and large sterile sheet    Hand hygiene: Hand hygiene performed immediately prior to central venous catheter insertion    Location:  Right femoral  Site selection rationale:  Emergent line placement during code blue  Catheter type:  Trialysis  Catheter size:  8 Fr  Inserted Catheter Length (cm):  20  Ultrasound guidance: Yes    Vessel Caliber:  Large   patent  Needle advanced into vessel with real time ultrasound guidance.    Guidewire confirmed in vessel.    Image recorded and saved.    Steril sheath on probe.    Sterile gel used.  Manometry: No (Wire ultrasounded in femoral vein in two views confirming correct placement)    Number of attempts:  1  Securement:  Line sutured, chlorhexidine patch, sterile dressing applied and blood return through all ports  Complications: No    Guidewire: guidewire removed intact, verified with nurse    Post-procedure assessment: Not indicated.  Adverse Events:  NoneTermination Site: inferior vena " leandra      6/24/2025    Supervised by me in its entirety.  Mickey Doe MD  [phone]

## 2025-06-24 NOTE — NURSING
Rapid Response Nurse Follow-up Note     Followed up with patient for proactive rounding.   Pt in bed asleep. Awakens to voice. Currently on 2L NC, no signs of respiratory distress. Denies all complaints.   Team will continue to follow.  Please call Rapid Response RN, Jesús Pinzon RN with any questions or concerns at [phone].

## 2025-06-24 NOTE — NURSING
Rapid Response Nurse Follow-up Note     Followed up with patient for proactive rounding.   [id number]: Received call from NICOLASA Dickerson. Stated that pt says that he cannot breathe despite O2 sats being 100% and pt refusing BiPAP. This RN to bedside. Pt tachypneic, RR 30, says he feels very anxious and he can't breathe. O2 sats 95%. Calmed pt down and asked if he would be willing to wear BiPAP to help with breathing. Pt agreeable. Placed on BiPAP. Vital as follows: HR 64, O2 sat 98%, Temp 95.1, BP 79/53. Placed bairhugger on pt. Contacted HELEN Torres regarding pt BP. NP placed orders for 250mL bolus.   Team will continue to follow.  Please call Rapid Response RN, Jesús Pinzon RN with any questions or concerns at [phone].

## 2025-06-24 NOTE — PT/OT/SLP PROGRESS
Speech Language Pathology      Domingo Mac Gross  MRN: [id number]      Pt with repeated code blue status this am resulting in change in medical status. Pt is now orally intubated in ICU at this time.             6/24/2025

## 2025-06-24 NOTE — PLAN OF CARE
"  Domingo Gross was identified as being in soft 2 point restraints within 24 hours of expiration.  This patient has been entered in the Restraint Mortality Log on 06/24/2025 @ 16:24."    Fatoumata Spivey RN   " Orthostatic hypotension

## 2025-06-24 NOTE — ASSESSMENT & PLAN NOTE
- BP 100s-130s overnight  - was on ARB, BB and Hydralazine as an outpatient- meds on hold due to hypotension over the weekend as well as pressor use currently

## 2025-06-24 NOTE — ASSESSMENT & PLAN NOTE
- extensive PCI history with LAD and RCA PCI 3/2019, LCX 2019, LAD REZA 2023, LCX PCI 5/2023, LCX PTCA lithotripsy 9/2024 LCX PTCA 1/2025  -  presented with chest pain with concern for USA/NSTEMI LHC deferred due to cardiac arrest  - plan for medical management for now with DAPT, statin; will plan to continue medical management for now; no plans for invasive strategy; patient very debilitated with recurrent cardiac arrest

## 2025-06-24 NOTE — ASSESSMENT & PLAN NOTE
- presented with chest pain; initial troponin 0.1-0.3 with trend up to 1.9 after cardiac arrest on 6/9; repeat troponin this AM post arreast 0.024  - history of CAD; will continue to defer  LHC recurrent given cardiac arrest with guarded condition and concern for poor prognosis given recurrent arrest    - no plans for LHC as of now

## 2025-06-24 NOTE — SIGNIFICANT EVENT
[facility] Medicine Acute Decompensation    Admit Date: 6/7/2025   LOS: 17  Code Status: DNR  CC: Cardiac arrest  Date of Consult: 06/24/2025  RRT Indication(s): CODE BLUE  Attending Physician: Pawan Garcia,*  Primary Service: [facility] MEDICINE PHYSICIAN TEAM 2    SUBJECTIVE:     Interval history: patient found unresponsive and pulseless; in PEA arrest. Noted to have hyperkalemia on am labs.      OBJECTIVE:     Vital Signs(Most Recent):  Temp: (!) 93.7 °F (34.3 °C) (06/24/25 1105)  Pulse: 89 (06/24/25 1115)  Resp: (!) 39 (06/24/25 1115)  BP: (!) 55/36 (06/24/25 1115)  SpO2: 100 % (06/24/25 1115) Vital Signs(24h Range):  Temp:  [93.7 °F (34.3 °C)-96.8 °F (36 °C)] 93.7 °F (34.3 °C)  Pulse:  [] 89  Resp:  [12-39] 39  SpO2:  [60 %-100 %] 100 %  BP: ()/() 55/36  Arterial Line BP: ()/(42-91) 137/64     I & O(24h)L    Intake/Output Summary (Last 24 hours) at 6/24/2025 1149  Last data filed at 6/24/2025 0942  Gross per 24 hour   Intake 1681.15 ml   Output 1650 ml   Net 31.15 ml        Physical Exam: Physical Exam   Constitutional: He appears toxic. He is intubated and restrained. He appears ill.   Cardiovascular: Bradycardia present. Pulmonary:      Effort: He is intubated.     Abdominal: Soft.   Musculoskeletal:         General: No swelling.   Neurological: He is disoriented.   Skin:   Bruising right flank       Lines/Drains/Airway:  Trialysis (Dialysis) Catheter 06/24/25 0742 right femoral (Active)   $ Dialysis Supplies Trialysis Catheter (Supply) 06/24/25 0743   Line Necessity Review CRRT/HD;Emergent;Hemodynamic instability 06/24/25 0743   Line Securement Device Secured with sutures 06/24/25 0743   Dressing Type CHG impregnated dressing/sponge 06/24/25 0743   Dressing Intervention First dressing 06/24/25 0743            Hemodialysis AV Fistula Left upper arm (Active)   Needle Size 16ga 06/19/25 1345   Site Assessment Intact;Dry 06/24/25 0515   Patency Bruit;Thrill;Present  06/24/25 0515   Status Not Accessed 06/23/25 0715   Flows Good 06/19/25 2029   Dressing Intervention Integrity maintained 06/24/25 0515   Dressing Status Dry;Intact 06/24/25 0515   Site Condition No complications 06/23/25 0715   Dressing Occlusive 06/22/25 1142   Drainage Description Serosanguineous 06/19/25 0715            Drain/Device  06/24/25 0825 medial upper quadrant other (see comments) (Active)            Airway - Non-Surgical 06/24/25 0653 Endotracheal Tube (Active)   Secured at 25 cm 06/24/25 0859   Measured At Lips 06/24/25 0859   Secured Location Center 06/24/25 0859   Secured by Commercial tube saunders 06/24/25 0725   Bite Block none 06/24/25 0725   Site Condition Cool;Dry 06/24/25 0725   Status Intact;Secured;Patent 06/24/25 0725   Site Assessment Clean;Dry;No bleeding;No drainage 06/24/25 0725   Cuff Volume Green Zone 18 - 26 cmH20 06/24/25 0725            Intraosseous Line 06/24/25 0749 Fibula (Active)            Intraosseous Line 06/24/25 0754 Humerus (Active)       Arterial Line 06/24/25 0723 Right Brachial (Active)   $ Arterial Line Charges (Upon Insertion) Bedside Insertion Performed 06/24/25 0743   $ Arterial  Charges FloTrac Sensor 06/24/25 0743   Line Status Pulsatile blood flow 06/24/25 0743   Art Line Waveform Appropriate 06/24/25 0743   Arterial Line Interventions Zeroed and calibrated;Leveled;Connections checked and tightened;Flushed per protocol 06/24/25 0743   Color/Movement/Sensation Capillary refill less than 3 sec 06/24/25 0743   Dressing Type CHG impregnated dressing/sponge 06/24/25 0743   Dressing Status Clean;Dry;Intact 06/24/25 0743   Dressing Intervention First dressing 06/24/25 0743            Wound 06/21/25 1407 Medical adhesive related skin injury Right Ear (Active)   Wound Image   06/21/25 1450   Appearance Red 06/23/25 1915   Periwound Area Intact 06/23/25 1915   Wound Edges Irregular 06/22/25 0740          Nutrition:  Current Diet Order   Procedures    Diet Renal  On Dialysis Standard Tray     Tray type::   Standard Tray        Labs/Imaging- All pertinent labs within the past 24 hours have been reviewed.    Diagnostics/Interventions during Rapid response     Code Blue: Initially PEA arrest. Multiple rounds of calcium, bicarb, epinephrine; briefly attained ROSC but rhythm devolved to Vfib s/p shock with 200J and subsequent ROSC. Noted on POCUS to have large pericardial effusion with concern for tamponade, s/p bedside pericardiocentesis.    ASSESSMENT/PLAN:     Active Hospital Problems    Diagnosis    *Cardiac arrest    Comfort measures only status    AV fistula thrombosis    Prolonged Q-T interval on ECG    NSTEMI (non-ST elevated myocardial infarction)    HFrEF (heart failure with reduced ejection fraction)    History of MI (myocardial infarction)    ESRD (end stage renal disease)    Paroxysmal atrial fibrillation    Hypothyroidism    Anemia due to chronic kidney disease, on chronic dialysis    Atherosclerosis of native coronary artery of native heart with unstable angina pectoris     >>OVERVIEW FOR CORONARY ARTERY DISEASE WRITTEN ON 11/21/2019  9:33 AM BY MANJIT FLORES MD        3/29/2019    S/p LHC via R radial           LM, LAD, and LCX are patent with luminal irregularities  RCA mid 95% calcified lesion  Normal EF with LVEDP 8 mmHg           PCI of mid LAD with 2.5 x 30 and 2.5 x 15 mm Resolute REZA  PCI of proximal RCA to treat guide dissection with 3.5 x 22 Resolute REZA        10/11/2019 for cardiac arrest        S/p staged LCX PCI                    L CFA access              IVUS guided PCI              3.0 x 15 mm Resolute REZA post dilated with 3.5 NC balloon         >>OVERVIEW FOR UNSTABLE ANGINA PECTORIS WRITTEN ON 6/4/2025  3:21 PM BY ZACKERY MOURA MD    >>OVERVIEW FOR UNSTABLE ANGINA WRITTEN ON 3/2/2023  4:00 PM BY DERECK FRANCOIS MD    I have messaged his cardiologist and electrophysiologist about this. Currently awaiting response       Hyperlipidemia     Primary hypertension          ICU on multiple pressors, receiving CRRT. See PN and nursing documentation for further discussion.       Critical care time spent on the evaluation and treatment of severe organ dysfunction, review of pertinent labs and imaging studies, discussions with consulting providers and discussions with patient/family 60 minutes

## 2025-06-24 NOTE — ASSESSMENT & PLAN NOTE
- EKG earlier in admission with  with holding of  Amiodarone;resumed with recurrent afib with RVR; most recent EKG 6/21 with   - will monitor QTC with Amiodarone use

## 2025-06-24 NOTE — ANESTHESIA PROCEDURE NOTES
Arterial    Diagnosis: Hypotension    Patient location during procedure: ICU  Timeout: 6/24/2025 7:07 AM  Procedure end time: 6/24/2025 7:10 AM    Staffing  Authorizing Provider: Valente Clemons Jr., MD  Performing Provider: Valente Clemons Jr., MD    Staffing  Performed by: Valente Clemons Jr., MD  Authorized by: Valente Clemons Jr., MD    Anesthesiologist was present at the time of the procedure.    Preanesthetic Checklist  Completed: patient identified, IV checked, site marked, risks and benefits discussed, surgical consent, monitors and equipment checked, pre-op evaluation, timeout performed and anesthesia consent givenArterial  Skin Prep: chlorhexidine gluconate  Local Infiltration: none  Orientation: right  Location: brachial    Catheter Size: 20 G  Catheter placement by Ultrasound guidance. Heme positive aspiration all ports.   Vessel Caliber: medium, patent  Needle advanced into vessel with real time Ultrasound guidance.  Guidewire confirmed in vessel.Insertion Attempts: 1  Assessment  Dressing: secured with tape and tegaderm  Additional Notes  High forearm, low brachial at about 1 cm depth. Placed emergently.

## 2025-06-24 NOTE — ASSESSMENT & PLAN NOTE
Creatine stable for now. BMP reviewed- noted Estimated Creatinine Clearance: 12.8 mL/min (A) (based on SCr of 6 mg/dL (H)). according to latest data. Based on current GFR, CKD stage is end stage.  Monitor UOP and serial BMP and adjust therapy as needed. Renally dose meds. Avoid nephrotoxic medications and procedures.    - on CRRT

## 2025-06-24 NOTE — ASSESSMENT & PLAN NOTE
Hyperkalemia is likely due to BRETT.The patients most recent potassium results are listed below.  Recent Labs     06/24/25  0441 06/24/25  0730 06/24/25  0818   K 6.3* 6.6* 6.5*     Plan  - Monitor for arrhythmias with EKG and/or continuous telemetry.   - Treat the hyperkalemia with Potassium Binders, Calcium gluconate, IV insulin and dextrose, and Sodium Bicarbonate.   - Monitor potassium: Every 8 hours  - The patient's hyperkalemia is improving          Hyponatremia is likely due to renal insufficiency. The patient's most recent sodium results are listed below.  Recent Labs     06/24/25  0441 06/24/25  0730 06/24/25  0818   * 132* 129*     Plan  - Correct the sodium by 4-6mEq in 24 hours.   - Will treat the hyponatremia with Hemodialysis  - Monitor sodium Every 8 hours.   - Patient hyponatremia is stable

## 2025-06-24 NOTE — ASSESSMENT & PLAN NOTE
- history of PAF with EKG in 2023 with PAF; EKGs this admission with NSR; intermittent recurrent episodes of afib with RVR throughout admission   - remains in  NSR;  transitioned to oral Amiodarone 400mg po BID x 10 days then 200mg daily thereafter; last EKG with  6/21 repeat EKG pending; will plan to continue Amiodarone for now unless QTC prolongs further   - no OAC on hold due to anemia  - CHADSVAS score 3 (CHF, HTN, PAD)

## 2025-06-24 NOTE — EICU
"Virtual ICU Admission    Admit Date: 2025  LOS: 17  Code Status: Full Code   : 1960  Bed: K455/K455 A:     Diagnosis: Cardiac arrest    Patient  has a past medical history of Acute congestive heart failure, Allergy, Anemia, Anticoagulant long-term use, Arthritis, CKD (chronic kidney disease) stage 4, GFR 15-29 ml/min, Closed fracture of transverse process of lumbar vertebra, COPD (chronic obstructive pulmonary disease), Coronary artery disease, Heart attack, Hematuria, Hemothorax, Hyperlipidemia, Hypertension, Hyperuricemia, Hypocalcemia, Hypokalemia, Hypophosphatemia, Hypothyroidism, PAD (peripheral artery disease), Proteinuria, and Vitamin D deficiency.    Last VS: BP (!) 86/54 (BP Location: Right arm)   Pulse 62   Temp 96 °F (35.6 °C) (Axillary)   Resp (!) 22   Ht 5' 10" (1.778 m)   Wt 76 kg (167 lb 8.8 oz)   SpO2 99%   BMI 24.04 kg/m²       VICU Review    VICU nurse assessment :  Kashia completed, LDA documentation reconciliation completed, Stress ulcer prophylaxis for ventilated patients review, and VTE prophylaxis review                 "

## 2025-06-24 NOTE — ASSESSMENT & PLAN NOTE
Patient was not able to get dialysis 06/20 because of inability to access his AV fistula  Temp cath was placed and patient got HD 06/20 -21 , cath removed 06/22   AVF declot by IR 06/21 and another declot and stent placement on 06/23  -may be worth evaluating function given persistent hyperkalemia despite dialysis

## 2025-06-24 NOTE — ASSESSMENT & PLAN NOTE
Advance Care Planning     [facility]  I engaged the family in a voluntary conversation about advance care planning and we specifically addressed what the goals of care would be moving forward, in light of the patient's change in clinical status, specifically recurrent cardiac arrest with cardiogenic shock.  We did specifically address the patient's likely prognosis, which is poor.  We explored the patient's values and preferences for future care.  The family endorses that what is most important right now is to focus on quality of life, even if it means sacrificing a little time and improvement in condition but with limits to invasive therapies    Accordingly, we have decided that the best plan to meet the patient's goals includes continuing with treatment and no further escalation in treatment; after time-limited trial of supportive therapy with CRRT and pressors were transition to comfort measures if no improvement or clinical worsening    A total of 35 min was spent on advance care planning, goals of care discussion, emotional support, formulating and communicating prognosis and exploring burden/benefit of various approaches of treatment. This discussion occurred on a fully voluntary basis with the verbal consent of the patient and/or family.

## 2025-06-24 NOTE — PLAN OF CARE
Pulmonary & Critical Care  Code Documentation      MET called overhead at 06:43 and shortly after Code Blue called and CPR begun. Patient intubated by anesthesia and ROSC achieved. Patient transferred to the ICU and went into a wide complex rhythm and shocked with 200k with a perfusing rhythm. The patient was ruth-arrest with worsening metabolic acidosis. Bedside POCUS with large pericardial effusion with tamponade physiology. Cardiology consulted and presented to the bedside for emergent pericardiocentesis which was performed and 1200cc of bloody fluid aspirated from drain which was sutured and left in place. The patient again briefly lost a pulse and ROSC was achieved after one round of compressions. At this point patient was maxed on levophed and vaso. Patient remained extremely tenuous and goals of care discussions were had with the wife. Please see documentation from primary team regarding these discussions. Given patient's clinical course, decision made to make patient DNR and attempt dialysis and 24 hour trial if tolerated with hopes of temporizing patient and allowing family to come in from out of town. Unfortunately a few hours later the patient abruptly became bradycardic and hypotensive and passed.    Jason Rodriguez M.D.  [employer] [facility] Fellow    Pt seen and examined with Pulmonary/Critical Care team and this note was reviewed and validated with the following additional comments: I responded to CODE BLUE. Pt resuscitated with CPR, 1 shock, and high dose vasopressors. FAST exam showed large pericardial effusion with echo evidence of tamponade physiology.  Emergent pericardiocentesis performed at bedside with improvement in hemodynamics.  CRRT initiated via Trialysis catheter in right groin. After discussion with wife and review of clinical course, it was decided to not further escalate care.    Critical Care time was spent validating the history and physical exam, reviewing the lab and imaging  results, and discussing the care of the patient with the bedside nurse and the patient and/or surrogates. This critical care time did not overlap with that of any other provider or involve time for any procedures.  This patient has a high probability of sudden clinically significant deterioration which requires the highest level of physician preparedness to intervene urgently. I managed/supervised life or organ supporting interventions that required frequent physician assessments. I devoted my full attention in the ICU to the direct care of this patient for this period of time. Organ systems which are failing and require intensive, critical care support are: cardiovascular, renal, respiratory,   Critical Care time: 95 minutes excluding procedures.    Mickey Doe MD  Phone [phone]

## 2025-06-24 NOTE — ANESTHESIA PROCEDURE NOTES
Ad Hoc Intubation    Date/Time: 6/24/2025 6:56 AM    Performed by: Valente Clemons Jr., MD  Authorized by: Valente Clemons Jr., MD    Indications:  Respiratory failure  Diagnosis:  Asystole  Patient Location:  Floor  Timeout:  6/24/2025 6:54 AM  Procedure Start Time:  6/24/2025 6:55 AM  Procedure End Time:  6/24/2025 6:56 AM  Staff:     Anesthesiologist Present: Yes    Intubation:     Mask Ventilation:  Easy with oral airway    Attempts:  1    Attempted By:  Staff anesthesiologist    Method of Intubation:  Direct    Blade:  Zakia 3    Laryngeal View Grade: Grade IIA - cords partially seen      Difficult Airway Encountered?: No      Complications:  None    Airway Device:  Oral endotracheal tube    Airway Device Size:  7.0    Style/Cuff Inflation:  Cuffed    Tube secured:  24    Secured at:  The lips    Placement Verified By:  Colorimetric ETCO2 device    Complicating Factors:  None    Findings Post-Intubation:  BS equal bilateral

## 2025-06-24 NOTE — DISCHARGE SUMMARY
[facility]  Discharge Summary      Patient Name: Domingo Gross  MRN: [id number]  NAOMI: [id number]  Patient Class: IP- Inpatient  Admission Date: 6/7/2025  Hospital Length of Stay: 17 days  Discharge Date and Time: 6/24/2025   Attending Physician: Pawan Garcia.,*   Discharging Provider: Pawan Garcia MD  Primary Care Provider: Geeta Coffey MD    Primary Care Team: [facility]AM 2    HPI:   64 y.o. male with PMH ESRD on HD, PAD with multiple interventions, CAD (mid LAD/prox RCA PCI 3/2019 and prox LCA in 10/2019 following out of hospital cardiac arrest), after cardiac arrest in setting of prox LCX occlusion treated with PCI, 9/16/2024 s/p intervention w cutting/shockwave PTCA to Lcx, 01/24/25 PCI of OM, new reduction in EF presents to ER with substernal chest pain that radiates to his throat. Report his chest pain feels like heaviness as if something is heavy sitting on his chest, started last night at rest, he took sublingual nitro which only slightly helped but he was still feeling it, today as he was having HD the pain got worse so he came to the ED.    Patient denies smoking or alcohol use        Procedure(s) (LRB):  Insertion,central venous access device (N/A)      Hospital Course:   Mr. Gross was admitted for chest pain, he was started on heparin drip for NSTEMI and scheduled for C on 06/19 but he had cardiac arrest that morning 3 times, he was intubated and monitored in the ICU but then was extubated to  with good mental status, awake and alert, tolerating HD but his AVF has been clotting and declot was done on 06/21 and again on 6/23 with stent placement    Patient was on and off heparin drip for his afib, due to low H&H requiring blood transfusions twice.    Unfortunately on morning of 6/24, he had additional PEA arrest.  He was initially resuscitated, intubated, and moved to ICU where he was found to have large  pericardial effusion causing tamponade.  He remained in cardiogenic shock and underwent emergent pericardiocentesis.  Initially, his pressor requirement improved slightly and he was started on CRRT due to hyperkalemia.  Unfortunately, while on CRRT he again developed bradycardia leading to cardiac arrest.  At that time he was transition to comfort measures and  peacefully with his wife at bedside     Goals of Care Treatment Preferences:  Code Status: DNR          What is most important right now is to focus on quality of life, even if it means sacrificing a little time, improvement in condition but with limits to invasive therapies.  Accordingly, we have decided that the best plan to meet the patient's goals includes continuing with treatment, no further escalation in treatment.         Consults:   Consults (From admission, onward)          Status Ordering Provider     Inpatient consult to Interventional Radiology  Once        Provider:  (Not yet assigned)    Completed DANA MELENDREZ     Inpatient consult to Interventional Radiology  Once        Provider:  Ben Babcock MD    Completed DANA MELENDREZ     Inpatient consult to Registered Dietitian/Nutritionist  Once        Provider:  (Not yet assigned)    Completed CATHERINE HERBERT     Inpatient consult to Anesthesiology  Once        Provider:  Yusuf Raygoza MD    Acknowledged YUSUF RAYGOZA     Inpatient consult to Nephrology-[facility] ([facility])  Once        Provider:  (Not yet assigned)    Acknowledged ERICH FRAZIER     Inpatient consult to Cardiology-[facility]  Once        Provider:  (Not yet assigned)    Completed BLAINE SHEEHAN JR            Assessment & Plan  Cardiac arrest  Patient had cardiac arrest requiring ACLS x3 on  with remarkable improvement and extubation on   - again with PA cardiac arrest on morning   - etiology of cardiac arrest likely hyperkalemia versus cardiac tamponade; see further  discussion below    Cardiogenic shock  Pericardial effusion with cardiac tamponade  This patient has shock. The type of shock is cardiogenic due to ischemic. The patient has the following evidence of shock: persistent hypotension and BRETT. The patient will be admitted to an intensive care unit  -cardiogenic shock following cardiac arrest this morning with cardiac tamponade   -status post pericardial drain with removal of 1.4 L bloody fluid  -still requiring multiple pressors at this time although requirements are slightly decreasing  -CRRT for continued management of electrolytes  -cardiology following, appreciate recs    Hyperkalemia  Hyponatremia  Hyperkalemia is likely due to BRETT.The patients most recent potassium results are listed below.  Recent Labs     06/24/25  0441 06/24/25  0730 06/24/25  0818   K 6.3* 6.6* 6.5*     Plan  - Monitor for arrhythmias with EKG and/or continuous telemetry.   - Treat the hyperkalemia with Potassium Binders, Calcium gluconate, IV insulin and dextrose, and Sodium Bicarbonate.   - Monitor potassium: Every 8 hours  - The patient's hyperkalemia is improving          Hyponatremia is likely due to renal insufficiency. The patient's most recent sodium results are listed below.  Recent Labs     06/24/25  0441 06/24/25  0730 06/24/25  0818   * 132* 129*     Plan  - Correct the sodium by 4-6mEq in 24 hours.   - Will treat the hyponatremia with Hemodialysis  - Monitor sodium Every 8 hours.   - Patient hyponatremia is stable    AV fistula thrombosis  Patient was not able to get dialysis 06/20 because of inability to access his AV fistula  Temp cath was placed and patient got HD 06/20 -21 , cath removed 06/22   AVF declot by IR 06/21 and another declot and stent placement on 06/23  -may be worth evaluating function given persistent hyperkalemia despite dialysis    Goals of care, counseling/discussion  Advance Care Planning     [facility]  I engaged the family in a voluntary conversation about  advance care planning and we specifically addressed what the goals of care would be moving forward, in light of the patient's change in clinical status, specifically recurrent cardiac arrest with cardiogenic shock.  We did specifically address the patient's likely prognosis, which is poor.  We explored the patient's values and preferences for future care.  The family endorses that what is most important right now is to focus on quality of life, even if it means sacrificing a little time and improvement in condition but with limits to invasive therapies    Accordingly, we have decided that the best plan to meet the patient's goals includes continuing with treatment and no further escalation in treatment; after time-limited trial of supportive therapy with CRRT and pressors were transition to comfort measures if no improvement or clinical worsening    A total of 35 min was spent on advance care planning, goals of care discussion, emotional support, formulating and communicating prognosis and exploring burden/benefit of various approaches of treatment. This discussion occurred on a fully voluntary basis with the verbal consent of the patient and/or family.        NSTEMI (non-ST elevated myocardial infarction)  History of MI (myocardial infarction)  Patient with chest pain worsened while on HD  Trops are flat 0.14  EKG with no ischemic changes  Echo with worsening wall motion as per cardio  Seen by cardiology, plan was for Left heart cath 06/09 but patient had a cardiac arrest that morning; managing medically for now  - continue ASA/plavix/statin  - Cardiology following    HFrEF (heart failure with reduced ejection fraction)  - likely ischemic  - volume control with dialysis    Repeat echo showed   Left Ventricle: The left ventricle is moderately dilated. Mildly increased wall thickness. There is eccentric hypertrophy. Regional wall motion abnormalities present. See diagram for wall motion findings. There is moderately  reduced systolic function with a visually estimated ejection fraction of 35 - 40%. Quantitated ejection fraction is 38%. Unable to assess diastolic function due to poor image quality.    Right Ventricle: The right ventricle is normal in size Systolic function is normal. TAPSE is 2.3 cm.    Overall the study quality was technically difficult.  Paroxysmal atrial fibrillation  Patient has long standing persistent (>12 months) atrial fibrillation. Patient is currently in sinus rhythm. [id number] Score: 1. The patients heart rate in the last 24 hours is as follows:  Pulse  Min: 0  Max: 122     Antiarrhythmics  amiodarone tablet 400 mg, 2 times daily, Oral  amiodarone tablet 200 mg, Daily, Oral    Anticoagulants  heparin (porcine) injection 2,400 Units, As needed (PRN), Intra-Catheter    Plan  - Replete lytes with a goal of K>4, Mg >2  - Patient is not anticoagulated due to bleeding  - Patient's afib is currently controlled  -not on AC due to low H&H requiring transfusions     ESRD (end stage renal disease)  Creatine stable for now. BMP reviewed- noted Estimated Creatinine Clearance: 12.8 mL/min (A) (based on SCr of 6 mg/dL (H)). according to latest data. Based on current GFR, CKD stage is end stage.  Monitor UOP and serial BMP and adjust therapy as needed. Renally dose meds. Avoid nephrotoxic medications and procedures.    - on CRRT  Primary hypertension  Patient's blood pressure range in the last 24 hours was: BP  Min: 45/27  Max: 150/100.The patient's inpatient anti-hypertensive regimen is listed below:  Current Antihypertensives       Plan  - BP is controlled, no changes needed to their regimen  - holding antihypertensives in setting of arrest/shock    Hyperlipidemia  -Continue statin  Anemia due to chronic kidney disease, on chronic dialysis  Anemia is likely due to chronic disease due to ESRD. Most recent hemoglobin and hematocrit are listed below.  Recent Labs     06/23/25  0433 06/24/25  0441 06/24/25  0720  06/24/25  0730 06/24/25  0823   HGB 7.4* 7.1*  --  7.5*  --    HCT 22.5* 22.8* 24.2* 24.2* 21.8*     Plan  - Monitor serial CBC: Daily  - Transfuse PRBC if patient becomes hemodynamically unstable, symptomatic or H/H drops below 7/21.  - Patient has received 1 units of PRBCs on 06/19.  - Patient's anemia is currently stable    Hypothyroidism  Cont levothyroxine     Atherosclerosis of native coronary artery of native heart with unstable angina pectoris  Patient with known CAD s/p stent placement, which is controlled Will continue ASA, Plavix, and Statin and monitor for S/Sx of angina/ACS. Continue to monitor on telemetry.   Prolonged Q-T interval on ECG  -continue to monitor    Shock liver  Patient is likely to have ischemic hepatitis as evidenced by abnormalities in AST, ALT, and T. BILI. Etiology includes cardiogenic shock. Continue to monitor liver function while treating underlying condition. Daily labs to include CMP, Liver Function Panel, and PT/INR.    AST   Date Value Ref Range Status   06/24/2025 1,400 (H) 11 - 45 unit/L Final     ALT   Date Value Ref Range Status   06/24/2025 1,377 (H) 10 - 44 unit/L Final       Final Active Diagnoses:    Diagnosis Date Noted POA    PRINCIPAL PROBLEM:  Cardiac arrest [I46.9] 10/04/2019 Yes    Pericardial effusion with cardiac tamponade [I31.39, I31.4] 06/24/2025 Yes    Goals of care, counseling/discussion [Z71.89] 06/24/2025 Not Applicable    Shock liver [K72.00] 06/24/2025 Yes    AV fistula thrombosis [T82.868A] 06/20/2025 No    Prolonged Q-T interval on ECG [R94.31] 06/12/2025 No    Cardiogenic shock [R57.0] 06/11/2025 No    NSTEMI (non-ST elevated myocardial infarction) [I21.4] 06/09/2025 Yes    HFrEF (heart failure with reduced ejection fraction) [I50.20] 09/13/2024 Yes    History of MI (myocardial infarction) [I25.2] 06/25/2024 Yes    ESRD (end stage renal disease) [N18.6] 02/16/2024 Yes    Hyponatremia [E87.1] 01/31/2024 Yes    Paroxysmal atrial fibrillation [I48.0]  2023 Yes    Hypothyroidism [E03.9] 2023 Yes    Hyperkalemia [E87.5] 2022 Yes    Anemia due to chronic kidney disease, on chronic dialysis [N18.6, D63.1, Z99.2] 12/15/2021 Not Applicable    Atherosclerosis of native coronary artery of native heart with unstable angina pectoris [I25.110] 2019 Yes    Hyperlipidemia [E78.5] 2015 Yes    Primary hypertension [I10] 2013 Yes      Problems Resolved During this Admission:    Diagnosis Date Noted Date Resolved POA    Anoxic encephalopathy due to cardiac arrest [G93.1, I46.9] 2025 No    Acute respiratory failure with hypoxia [J96.01] 2025 No    Hypokalemia [E87.6] 2025 Yes       Discharged Condition:     Disposition:     Follow Up:    Patient Instructions:   No discharge procedures on file.    Significant Diagnostic Studies: N/A    Pending Diagnostic Studies:       Procedure Component Value Units Date/Time    EKG 12-lead [[id number]]     Order Status: Sent Lab Status: No result     Echo [[id number]]  (Abnormal) Resulted: 25    Order Status: Sent Lab Status: In process Updated: 25     LA area A4C 12.62 cm2      LV ESV A4C 22.64 mL      Triscuspid Valve Regurgitation Peak Gradient 34 mmHg      RA Vol 56.35 mL      LV Diastolic Volume 85 mL      LV Systolic Volume 50 mL      TR Max Jakob 2.9 m/s      RA area length vol 55.02 mL      RV- kang basal diam 4.4 cm      RA Area 18.3 cm2      RV S' 6.85 cm/s      TAPSE 0.8 cm      Ascending aorta 3.6 cm      Sinus 3.9 cm      LVIDs 3.5 cm      PW 1.6 cm      IVS 1.4 cm      LVIDd 4.3 cm      PV PEAK VELOCITY 0.68 m/s      TDI LATERAL 0.06 m/s      Left Ventricular End Systolic Volume by Teichholz Method 49.80 mL      Left Ventricular End Diastolic Volume by Teichholz Method 85.10 mL      RV/LV Ratio 1.02 cm      ASI 2.0 cm/m2      FS 18.6 %      LV mass 258.1 g      ZLVIDD -2.40     ZLVIDS 0.32     Left Ventricle  Relative Wall Thickness 0.74 cm      PV peak gradient 2 mmHg      LV Systolic Volume Index 25.9 mL/m2      LV Diastolic Volume Index 44.04 mL/m2      LV Mass Index 133.7 g/m2      RA vol index 28.51 mL/m2      ASI 1.9 cm/m2      BSA 1.93 m2     IR Thrombectomy A/V Fistula w/Imaging [[id number]] Resulted: 06/23/25 1119    Order Status: Sent Lab Status: In process Updated: 06/23/25 1246    Lactic acid, plasma [[id number]]     Order Status: Sent Lab Status: No result     Specimen: Blood     Magnesium [[id number]]     Order Status: Sent Lab Status: No result     Specimen: Blood     Neuron Specific Enolase, Serum [[id number]] Collected: 06/09/25 0650    Order Status: Sent Lab Status: In process Updated: 06/09/25 0656    Specimen: Blood     Renal function panel [[id number]]     Order Status: Sent Lab Status: No result     Specimen: Blood            Medications:  None    Indwelling Lines/Drains at time of discharge:   Lines/Drains/Airways       Central Venous Catheter Line  Duration             Trialysis (Dialysis) Catheter 06/24/25 0742 right femoral <1 day              Drain  Duration                  Hemodialysis AV Fistula Left upper arm -- days         Drain/Device  06/24/25 0825 medial upper quadrant other (see comments) <1 day              Airway  Duration                  Airway - Non-Surgical 06/24/25 0653 Endotracheal Tube <1 day              Arterial Line  Duration             Arterial Line 06/24/25 0723 Right Brachial <1 day              Intraosseous Line  Duration                  Intraosseous Line 06/24/25 0749 Fibula <1 day         Intraosseous Line 06/24/25 0754 Humerus <1 day                        Time spent on the discharge of patient: 90 minutes    Critical care time spent on the evaluation and treatment of severe organ dysfunction, review of pertinent labs and imaging studies, discussions with consulting providers and discussions with patient/family: 90 minutes.     Pawan Garcia,  MD  Department of Hospital Medicine  [place] - Intensive Care

## 2025-06-24 NOTE — ASSESSMENT & PLAN NOTE
- AST 1400 ALT 1377 total bili 1.3  - shock liver in setting of cardiac arrest and hypotension  - if further trend up may need to hold statin therapy as well as Amiodarone

## 2025-06-24 NOTE — ASSESSMENT & PLAN NOTE
Advance Care Planning     [facility]  I engaged the family in a voluntary conversation about advance care planning and we specifically addressed what the goals of care would be moving forward, in light of the patient's change in clinical status, specifically recurrent cardiac arrest with cardiogenic shock.  We did specifically address the patient's likely prognosis, which is poor.  We explored the patient's values and preferences for future care.  The family endorses that what is most important right now is to focus on quality of life, even if it means sacrificing a little time and improvement in condition but with limits to invasive therapies    Accordingly, we have decided that the best plan to meet the patient's goals includes continuing with treatment and no further escalation in treatment; after time-limited trial of supportive therapy with CRRT and pressors were transition to comfort measures if no improvement or clinical worsening    A total of 35 min was spent on advance care planning, goals of care discussion, emotional support, formulating and communicating prognosis and exploring burden/benefit of various approaches of treatment. This discussion occurred on a fully voluntary basis with the verbal consent of the patient and/or family.

## 2025-06-24 NOTE — SIGNIFICANT EVENT
Death Note  Critical Care  [facility]            I was called to bedside on 6/24/2025 at 1:18 PM. Nursing at beside, nursing supervisor notified.  has been notified. Family at bedside.     Patient is not responding to verbal or tactile stimuli. No heart or breath sounds on auscultation. No respirations. No palpable pulses. Patient does not have a pupillary or corneal reflex. Patient's pupils are fixed and dilated.      Time of death is 6/24/2025  at 1:19 PM        Cause of Death Shock secondary to metabolic acidosis        Signing Physician:     Jason Rodriguez M.D.  [facility] Pulmonary & Critical Care Fellow  [facility] Contact  Email: [email or web address]

## 2025-06-24 NOTE — ASSESSMENT & PLAN NOTE
- H&H 7.5&24.2 this AM down from 8-9/25-28 previous days  - anemia felt to be related to AOCD; remains on DAPT; will continue to hold OAC and IV Heparin   - monitor H&H closely

## 2025-06-24 NOTE — PROGRESS NOTES
[facility] Medicine  Progress Note    Patient Name: Domingo Gross  MRN: [id number]  Patient Class: IP- Inpatient   Admission Date: 6/7/2025  Length of Stay: 17 days  Attending Physician: Pawan Garcia,*  Primary Care Provider: Geeta Coffey MD        Subjective     Principal Problem:Cardiac arrest        HPI:  64 y.o. male with PMH ESRD on HD, PAD with multiple interventions, CAD (mid LAD/prox RCA PCI 3/2019 and prox LCA in 10/2019 following out of hospital cardiac arrest), after cardiac arrest in setting of prox LCX occlusion treated with PCI, 9/16/2024 s/p intervention w cutting/shockwave PTCA to Lcx, 01/24/25 PCI of OM, new reduction in EF presents to ER with substernal chest pain that radiates to his throat. Report his chest pain feels like heaviness as if something is heavy sitting on his chest, started last night at rest, he took sublingual nitro which only slightly helped but he was still feeling it, today as he was having HD the pain got worse so he came to the ED.    Patient denies smoking or alcohol use        Overview/Hospital Course:  Patient was admitted for chest pain, he was started on heparin drip for NSTEMI and scheduled for [facility] on 06/19 but he had cardiac arrest that morning 3 times, he was intubated and monitored in the ICU but then was extubated to RA with good mental status, awake and alert, tolerating HD but his AVF has been clotting and declot was done on 06/21 and again on 6/23 with stent placement    Patient was on and off heparin drip for his afib, due to low H&H requiring blood transfusions twice.    Patient will go to acute rehab.    Interval History: Code Blue this am at approx 6:45am. Initially PEA arrest. Multiple rounds of calcium, bicarb, epinephrine; briefly attained ROSC but rhythm devolved to Vfib s/p shock with 200J and subsequent ROSC. Noted on POCUS to have large pericardial effusion with concern for tamponade, s/p bedside  pericardiocentesis. Initially with high pressor requirements which have slightly improved at this time.  Discussed with wife; he is now DNR but will observe clinical course on CRRT for time-limited trial supportive therapy.  If further clinical decompensation, transition to comfort measures.    Review of Systems  Objective:     Vital Signs (Most Recent):  Temp: (!) 93.7 °F (34.3 °C) (06/24/25 1105)  Pulse: 89 (06/24/25 1115)  Resp: (!) 39 (06/24/25 1115)  BP: (!) 55/36 (06/24/25 1115)  SpO2: 100 % (06/24/25 1115) Vital Signs (24h Range):  Temp:  [93.7 °F (34.3 °C)-96.8 °F (36 °C)] 93.7 °F (34.3 °C)  Pulse:  [] 89  Resp:  [12-39] 39  SpO2:  [60 %-100 %] 100 %  BP: ()/() 55/36  Arterial Line BP: ()/(42-91) 137/64     Weight: 75.8 kg (167 lb)  Body mass index is 23.96 kg/m².    Intake/Output Summary (Last 24 hours) at 6/24/2025 1155  Last data filed at 6/24/2025 0942  Gross per 24 hour   Intake 1681.15 ml   Output 1650 ml   Net 31.15 ml         Physical Exam  Constitutional:       Appearance: He is toxic-appearing.      Interventions: He is sedated, intubated and restrained.   Cardiovascular:      Comments: Flail chest.  Pericardial drain in place  Pulmonary:      Effort: He is intubated.   Musculoskeletal:      Comments: Bruising on right flank               Significant Labs: All pertinent labs within the past 24 hours have been reviewed.    Significant Imaging: I have reviewed all pertinent imaging results/findings within the past 24 hours.      Assessment & Plan  Cardiac arrest  Patient had cardiac arrest requiring ACLS x3 on 6/9 with remarkable improvement and extubation on 06/11  - again with PA cardiac arrest on morning 6/24  - etiology of cardiac arrest likely hyperkalemia versus cardiac tamponade; see further discussion below    Cardiogenic shock  Pericardial effusion with cardiac tamponade  This patient has shock. The type of shock is cardiogenic due to ischemic. The patient has the  following evidence of shock: persistent hypotension and BRETT. The patient will be admitted to an intensive care unit  -cardiogenic shock following cardiac arrest this morning with cardiac tamponade   -status post pericardial drain with removal of 1.4 L bloody fluid  -still requiring multiple pressors at this time although requirements are slightly decreasing  -CRRT for continued management of electrolytes  -cardiology following, appreciate recs    Hyperkalemia  Hyponatremia  Hyperkalemia is likely due to BRETT.The patients most recent potassium results are listed below.  Recent Labs     06/24/25  0441 06/24/25  0730 06/24/25  0818   K 6.3* 6.6* 6.5*     Plan  - Monitor for arrhythmias with EKG and/or continuous telemetry.   - Treat the hyperkalemia with Potassium Binders, Calcium gluconate, IV insulin and dextrose, and Sodium Bicarbonate.   - Monitor potassium: Every 8 hours  - The patient's hyperkalemia is improving          Hyponatremia is likely due to renal insufficiency. The patient's most recent sodium results are listed below.  Recent Labs     06/24/25  0441 06/24/25  0730 06/24/25  0818   * 132* 129*     Plan  - Correct the sodium by 4-6mEq in 24 hours.   - Will treat the hyponatremia with Hemodialysis  - Monitor sodium Every 8 hours.   - Patient hyponatremia is stable    AV fistula thrombosis  Patient was not able to get dialysis 06/20 because of inability to access his AV fistula  Temp cath was placed and patient got HD 06/20 -21 , cath removed 06/22   AVF declot by IR 06/21 and another declot and stent placement on 06/23  -may be worth evaluating function given persistent hyperkalemia despite dialysis    Goals of care, counseling/discussion  Advance Care Planning     [facility]  I engaged the family in a voluntary conversation about advance care planning and we specifically addressed what the goals of care would be moving forward, in light of the patient's change in clinical status, specifically recurrent  cardiac arrest with cardiogenic shock.  We did specifically address the patient's likely prognosis, which is poor.  We explored the patient's values and preferences for future care.  The family endorses that what is most important right now is to focus on quality of life, even if it means sacrificing a little time and improvement in condition but with limits to invasive therapies    Accordingly, we have decided that the best plan to meet the patient's goals includes continuing with treatment and no further escalation in treatment; after time-limited trial of supportive therapy with CRRT and pressors were transition to comfort measures if no improvement or clinical worsening    A total of 35 min was spent on advance care planning, goals of care discussion, emotional support, formulating and communicating prognosis and exploring burden/benefit of various approaches of treatment. This discussion occurred on a fully voluntary basis with the verbal consent of the patient and/or family.        NSTEMI (non-ST elevated myocardial infarction)  History of MI (myocardial infarction)  Patient with chest pain worsened while on HD  Trops are flat 0.14  EKG with no ischemic changes  Echo with worsening wall motion as per cardio  Seen by cardiology, plan was for Left heart cath 06/09 but patient had a cardiac arrest that morning; managing medically for now  - continue ASA/plavix/statin  - Cardiology following    HFrEF (heart failure with reduced ejection fraction)  - likely ischemic  - volume control with dialysis    Repeat echo showed   Left Ventricle: The left ventricle is moderately dilated. Mildly increased wall thickness. There is eccentric hypertrophy. Regional wall motion abnormalities present. See diagram for wall motion findings. There is moderately reduced systolic function with a visually estimated ejection fraction of 35 - 40%. Quantitated ejection fraction is 38%. Unable to assess diastolic function due to poor image  quality.    Right Ventricle: The right ventricle is normal in size Systolic function is normal. TAPSE is 2.3 cm.    Overall the study quality was technically difficult.  Paroxysmal atrial fibrillation  Patient has long standing persistent (>12 months) atrial fibrillation. Patient is currently in sinus rhythm. [id number] Score: 1. The patients heart rate in the last 24 hours is as follows:  Pulse  Min: 60  Max: 122     Antiarrhythmics  amiodarone tablet 400 mg, 2 times daily, Oral  amiodarone tablet 200 mg, Daily, Oral    Anticoagulants  heparin (porcine) injection 2,400 Units, As needed (PRN), Intra-Catheter    Plan  - Replete lytes with a goal of K>4, Mg >2  - Patient is not anticoagulated due to bleeding  - Patient's afib is currently controlled  -not on AC due to low H&H requiring transfusions     ESRD (end stage renal disease)  Creatine stable for now. BMP reviewed- noted Estimated Creatinine Clearance: 12.8 mL/min (A) (based on SCr of 6 mg/dL (H)). according to latest data. Based on current GFR, CKD stage is end stage.  Monitor UOP and serial BMP and adjust therapy as needed. Renally dose meds. Avoid nephrotoxic medications and procedures.    - on CRRT  Primary hypertension  Patient's blood pressure range in the last 24 hours was: BP  Min: 45/27  Max: 150/100.The patient's inpatient anti-hypertensive regimen is listed below:  Current Antihypertensives       Plan  - BP is controlled, no changes needed to their regimen  - holding antihypertensives in setting of arrest/shock    Hyperlipidemia  -Continue statin  Anemia due to chronic kidney disease, on chronic dialysis  Anemia is likely due to chronic disease due to ESRD. Most recent hemoglobin and hematocrit are listed below.  Recent Labs     06/23/25  0433 06/24/25  0441 06/24/25  0720 06/24/25  0730 06/24/25  0823   HGB 7.4* 7.1*  --  7.5*  --    HCT 22.5* 22.8* 24.2* 24.2* 21.8*     Plan  - Monitor serial CBC: Daily  - Transfuse PRBC if patient becomes  hemodynamically unstable, symptomatic or H/H drops below 7/21.  - Patient has received 1 units of PRBCs on 06/19.  - Patient's anemia is currently stable    Hypothyroidism  Cont levothyroxine     Atherosclerosis of native coronary artery of native heart with unstable angina pectoris  Patient with known CAD s/p stent placement, which is controlled Will continue ASA, Plavix, and Statin and monitor for S/Sx of angina/ACS. Continue to monitor on telemetry.   Prolonged Q-T interval on ECG  -continue to monitor    VTE Risk Mitigation (From admission, onward)           Ordered     heparin (porcine) injection 2,400 Units  As needed (PRN)         06/20/25 1729     IP VTE HIGH RISK PATIENT  Once         06/07/25 1155     Place sequential compression device  Until discontinued         06/07/25 1155                    Discharge Planning   FLACO: 6/25/2025     Code Status: DNR   Medical Readiness for Discharge Date: 6/20/2025  Discharge Plan A: Rehab ([facility])   Discharge Delays:  (pending medical stability)          Critical care time spent on the evaluation and treatment of severe organ dysfunction, review of pertinent labs and imaging studies, discussions with consulting providers and discussions with patient/family: 90 minutes.        Pawan Garcia MD  Department of Hospital Medicine   [facility] - Intensive Care

## 2025-06-24 NOTE — ASSESSMENT & PLAN NOTE
Patient has long standing persistent (>12 months) atrial fibrillation. Patient is currently in sinus rhythm. [id number] Score: 1. The patients heart rate in the last 24 hours is as follows:  Pulse  Min: 0  Max: 122     Antiarrhythmics  amiodarone tablet 400 mg, 2 times daily, Oral  amiodarone tablet 200 mg, Daily, Oral    Anticoagulants  heparin (porcine) injection 2,400 Units, As needed (PRN), Intra-Catheter    Plan  - Replete lytes with a goal of K>4, Mg >2  - Patient is not anticoagulated due to bleeding  - Patient's afib is currently controlled  -not on AC due to low H&H requiring transfusions

## 2025-06-24 NOTE — PLAN OF CARE
Problem: Adult Inpatient Plan of Care  Goal: Plan of Care Review  Outcome: Progressing  Goal: Patient-Specific Goal (Individualized)  Outcome: Progressing  Goal: Optimal Comfort and Wellbeing  Outcome: Progressing  Goal: Readiness for Transition of Care  Outcome: Progressing     Problem: Wound  Goal: Optimal Coping  Outcome: Progressing  Goal: Optimal Functional Ability  Outcome: Progressing  Goal: Absence of Infection Signs and Symptoms  Outcome: Progressing  Goal: Optimal Pain Control and Function  Outcome: Progressing  Goal: Skin Health and Integrity  Outcome: Progressing  Goal: Optimal Wound Healing  Outcome: Progressing     Problem: Fall Injury Risk  Goal: Absence of Fall and Fall-Related Injury  Outcome: Progressing     Problem: Hemodialysis  Goal: Safe, Effective Therapy Delivery  Outcome: Progressing  Goal: Effective Tissue Perfusion  Outcome: Progressing     Problem: Delirium  Goal: Optimal Coping  Outcome: Progressing  Goal: Improved Attention and Thought Clarity  Outcome: Progressing  Goal: Improved Sleep  Outcome: Progressing     Problem: Mechanical Ventilation Invasive  Goal: Optimal Device Function  Outcome: Progressing  Goal: Optimal Nutrition Delivery  Outcome: Progressing     Problem: Dysrhythmia  Goal: Normalized Cardiac Rhythm  Outcome: Progressing

## 2025-06-24 NOTE — ASSESSMENT & PLAN NOTE
Patient's blood pressure range in the last 24 hours was: BP  Min: 45/27  Max: 150/100.The patient's inpatient anti-hypertensive regimen is listed below:  Current Antihypertensives       Plan  - BP is controlled, no changes needed to their regimen  - holding antihypertensives in setting of arrest/shock

## 2025-06-24 NOTE — NURSING
Rapid Response Nurse Follow-up Note     Followed up with patient for proactive rounding.   Notified HELEN Torres of K 6[facility]. NP to discuss with day attending about shifting vs dialysis. Requested that NP ask day team to not discharge pt to rehab today.  Team will continue to follow.  Please call Rapid Response RN, Jesús Pinzon RN with any questions or concerns at [phone].

## 2025-06-24 NOTE — NURSING
"PROACTIVE ROUNDING NURSE AI ALERT       AI alert received.    Chart Reviewed: 06/24/2025, 4:53 AM    MRN: [id number]  Bed: K455/K455 A    Dx: Cardiac arrest    Domingo Gross has a past medical history of Acute congestive heart failure, Allergy, Anemia, Anticoagulant long-term use, Arthritis, CKD (chronic kidney disease) stage 4, GFR 15-29 ml/min, Closed fracture of transverse process of lumbar vertebra, COPD (chronic obstructive pulmonary disease), Coronary artery disease, Heart attack, Hematuria, Hemothorax, Hyperlipidemia, Hypertension, Hyperuricemia, Hypocalcemia, Hypokalemia, Hypophosphatemia, Hypothyroidism, PAD (peripheral artery disease), Proteinuria, and Vitamin D deficiency.    Last VS: BP (!) 79/53 (BP Location: Right arm, Patient Position: Lying)   Pulse 60   Temp (!) 95.1 °F (35.1 °C) (Axillary) Comment: bearhugger applied  Resp 12   Ht 5' 10" (1.778 m)   Wt 76 kg (167 lb 8.8 oz)   SpO2 98%   BMI 24.04 kg/m²     24H Vital Sign Range:  Temp:  [94.1 °F (34.5 °C)-96.8 °F (36 °C)]   Pulse:  [60-72]   Resp:  [12-20]   BP: ()/(51-70)   SpO2:  [92 %-100 %]     Level of Consciousness (AVPU): alert    Recent Labs     06/21/25  1546 06/21/25  1551 06/22/25  0605 06/23/25  0433   WBC 10.04  --  12.81* 10.74   HGB 7.4*  --  7.3* 7.4*   HCT 22.3* 25.8* 22.3* 22.5*     --  232 266       Recent Labs     06/21/25  0914 06/21/25  1546 06/22/25  0605 06/23/25  0433   * 132* 134* 131*   K 5.4* 4.8 5.1 5.4*   CL 99 97 97 94*   CO2 20* 25 25 25   BUN 62* 23 37* 54*   CREATININE 6.2* 3.1* 4.5* 5.9*    273* 101 103   PHOS 6.2*  --  5.4* 5.8*   MG  --   --   --  2.1        Recent Labs     06/21/25  1551 06/21/25  1945   PH 7.407 7.417   PCO2 46.8* 42.8   PO2 10.2* 23.6*   HCO3 29.4* 27.6   POCSATURATED 6.9* 32.8*        OXYGEN:  Flow (L/min) (Oxygen Therapy): 2  Oxygen Concentration (%): 21       MEWS score: 3     alert fired due to vitals charted by this RN during rounding. See previous note " for details. No additional concerns verbalized at this time. Instructed to call Proactive Rounding at [phone] for further concerns or assistance.    Jesús Pinzon RN

## 2025-06-24 NOTE — PROGRESS NOTES
[facility] - Intensive Care  Cardiology  Progress Note    Patient Name: Domingo Gross  MRN: [id number]  Admission Date: 6/7/2025  Hospital Length of Stay: 17 days  Code Status: DNR   Attending Physician: Pawan Garcia,*   Primary Care Physician: Geeta Coffey MD  Expected Discharge Date: 6/25/2025  Principal Problem:Cardiac arrest    Subjective:     Hospital Course:     Per Dr. Salazar consult note 6/7/2025 64 y.o. male with PMH ESRD on HD, PAD with multiple interventions, CAD (mid LAD/prox RCA PCI 3/2019 and prox LCA in 10/2019 following out of hospital cardiac arrest), after cardiac arrest in setting of prox LCX occlusion treated with PCI, 9/16/2024 s/p intervention w cutting/shockwave PTCA to Lcx, 01/24/25 PCI of OM, new reduction in EF presents to ER with substernal chest pain that radiates to his throat. Reports his chest pain started last night which did not improved with nitro. Trops with significant delta from baseline. Concerning symptoms given extensive cardiovascular history.   CAD with multiple interventions/ NSTEMI: start heparin gtt, hold eliquis, continue DAPT, statin, bb  HFrEF - continue BB, will continue to optimize GDMT   Afib per EP- holding eliquis, continue heparin   ESRD on HD- please consult nephro   -NPO Sunday MN FOR CATH+/-PCI Monday. If refractory chest pain despite meds contact me - low threshold for cath.   -Trend troponin    6/9/2025 Cardiac arrest with PEA with ACLS initiated and VTach as well. LHC cancelled   Per  Cardiac arrest- PEA ARREST multiples ACLS round. qTC >600ms .   Multiple cardiac arrest in patient with extensive CAD history  ESRD  NSTEMI   Plan:   -DAPT  -Levophed  prn to keep MAP > 65  -Continue DAPT (recent PCI of Lcx)  -HOLDING PO MEDS cautious with IV amiodarone meds   -Monitor Qtc and electrolytes  -Extubate on BiPAP per ICU team.   -Please contact me for any cardiac question     6/13/2025 HD yesterday with 1L removed. H&H down to 7.4&22.5 this AM  Recurrent afib with RVR this AM. BP stable overnight     Per Dr Moran progress note 6/15/2025  1. Cardiac arrest/ PEA arrest, and reported VT during the code  2. History of coronary artery disease status post multiple PCI  3. Heart failure reduced EF/ischemic cardiomyopathy  4. ESRD on dialysis  5. Paroxysmal Afib  6. Anemia post PRBCs transfusion        Plan  Good recovery post cardiac arrest from cardiac standpoint.  Still with confusion  We will recommend medical therapy from cardiac standpoint at this time  We will continue IV amiodarone for time being.  Likely we will switch to p.o. with the next 48 hours     Monitor QT closely  Continue aspirin and Plavix.  Anticoagulation on hold given his anemia which is stable.   I will recommend starting systemic anticoagulation with heparin and monitor H&H closely and switch to DOAC if H&H stable  Once started on full-dose anticoagulation we can stop aspirin and continue Plavix  Continue Coreg 12.5 mg b.i.d.  Continue hydralazine  Continue losartan  6/16/2025 Recurrent afib with RVR over the weekend requiring resumption of IV Amiodarone. Converted to NSR and remains in NSR. SBP 90s-110s overnight CBC with H&H 8.3&25.5 down from 9.0&28.0 BMP with Na 133 K+ 3.4- replacement ordered. Initial EKG this AM at 0700 with NSR with repeat EKG at 0900 with ?afib; -547  6/17/2025  Converted to NSR overnight- remains on IV Amiodarone. CBC with H&H down to 7.7&24.2 from 8&25 yesterday- IV Heparin drip resumed yesterday. SBP 90s-100s BMP with K+ 4.5  6/18/2025 Currently in NSR SBP 90s-1110s CBC with H&H 7.9&24.5 BMP with Na 133 K+ 5.0. OOB to chair this AM. No recurrent afib or VTach noted on telemetry overnight   6/24/2025 Followed from afar by cardiology until this AM. Not recurrent afib with RVR for the past several days and stepped down out of ICU. Fistula declot x 2 once 6/20 and 6/23. Cardiac arrest this AM with PEA arrest followed by VFib terminated with DCCV. Emergent  POCUS with large pericardial effusion with bedside pericardiocentesis with 1.3L removed. Intubated and on pressors (Levophed 1.8mcg/kg/min and Vaso 0.04u/hr) Lactic acid 11.4. CBC with WBCs 20K H&H 7.5&24.2 total bili 1.3 AST 1400 AST 1377 Troponin 0.024 lactic acid 11.4 EKG pending               Review of Systems   Unable to perform ROS: Intubated     Objective:     Vital Signs (Most Recent):  Temp: (!) 93.7 °F (34.3 °C) (06/24/25 1105)  Pulse: 82 (06/24/25 1220)  Resp: (!) 33 (06/24/25 1220)  BP: (!) 76/50 (06/24/25 1220)  SpO2: 100 % (06/24/25 1220) Vital Signs (24h Range):  Temp:  [93.7 °F (34.3 °C)-96.8 °F (36 °C)] 93.7 °F (34.3 °C)  Pulse:  [] 82  Resp:  [12-39] 33  SpO2:  [60 %-100 %] 100 %  BP: ()/() 76/50  Arterial Line BP: ()/(42-91) 120/60     Weight: 75.8 kg (167 lb)  Body mass index is 23.96 kg/m².     SpO2: 100 %         Intake/Output Summary (Last 24 hours) at 6/24/2025 1244  Last data filed at 6/24/2025 1233  Gross per 24 hour   Intake 2245.54 ml   Output 1650 ml   Net 595.54 ml       Lines/Drains/Airways       Central Venous Catheter Line  Duration             Trialysis (Dialysis) Catheter 06/24/25 0742 right femoral <1 day              Drain  Duration                  Drain/Device  06/24/25 0825 medial upper quadrant other (see comments) <1 day              Airway  Duration                  Airway - Non-Surgical 06/24/25 0653 Endotracheal Tube <1 day              Arterial Line  Duration             Arterial Line 06/24/25 0723 Right Brachial <1 day              Intraosseous Line  Duration                  Intraosseous Line 06/24/25 0749 Fibula <1 day         Intraosseous Line 06/24/25 0754 Humerus <1 day              Peripheral Intravenous Line  Duration                  Hemodialysis AV Fistula Left upper arm -- days    Peripheral IV Single Lumen 06/19/25 1530 20 G 1/2 in Anterior;Right Forearm 4 days                       Physical Exam  Constitutional:       General: He is  not in acute distress.     Appearance: He is well-developed. He is ill-appearing.   Neck:      Vascular: No JVD.   Cardiovascular:      Rate and Rhythm: Normal rate and regular rhythm.      Heart sounds: No murmur heard.     No gallop.   Pulmonary:      Effort: Pulmonary effort is normal. No respiratory distress.      Breath sounds: Normal breath sounds. No wheezing.   Abdominal:      General: Bowel sounds are normal. There is no distension.      Palpations: Abdomen is soft.      Tenderness: There is no abdominal tenderness.   Musculoskeletal:      Cervical back: Normal range of motion and neck supple.   Skin:     General: Skin is warm and dry.   Psychiatric:         Behavior: Behavior normal.         Thought Content: Thought content normal.         Judgment: Judgment normal.              Significant Imaging: Echocardiogram: Transthoracic echo (TTE) complete (Cupid Only):   Results for orders placed or performed during the hospital encounter of 06/07/25   Echo   Result Value Ref Range    LA area A4C 12.62 cm2    LV ESV A4C 22.64 mL    Triscuspid Valve Regurgitation Peak Gradient 34 mmHg    RA Vol 56.35 mL    LV Diastolic Volume 85 mL    LV Systolic Volume 50 mL    TR Max Jakob 2.9 m/s    RA area length vol 55.02 mL    RV- kang basal diam 4.4 cm    RA Area 18.3 cm2    RV S' 6.85 cm/s    TAPSE 0.8 cm    Ascending aorta 3.6 cm    Sinus 3.9 cm    LVIDs 3.5 2.1 - 4.0 cm    PW 1.6 (A) 0.6 - 1.1 cm    IVS 1.4 (A) 0.6 - 1.1 cm    LVIDd 4.3 3.5 - 6.0 cm    PV PEAK VELOCITY 0.68 m/s    TDI LATERAL 0.06 m/s    Left Ventricular End Systolic Volume by Teichholz Method 49.80 mL    Left Ventricular End Diastolic Volume by Teichholz Method 85.10 mL    RV/LV Ratio 1.02 cm    ASI 2.0 cm/m2    FS 18.6 (A) 28 - 44 %    LV mass 258.1 g    ZLVIDD -2.40     ZLVIDS 0.32     Left Ventricle Relative Wall Thickness 0.74 cm    PV peak gradient 2 mmHg    LV Systolic Volume Index 25.9 mL/m2    LV Diastolic Volume Index 44.04 mL/m2    LV Mass Index  133.7 g/m2    RA vol index 28.51 mL/m2    ASI 1.9 cm/m2    BSA 1.93 m2     Assessment and Plan:     Brief HPI: Seen on AM rounds with Dr. Power after cardiac arrest and bedside pericardiocentesis and discussed POC with primary team as well as Pulm CC. Repeat NP rounds later in AM with wife at the bedside while on pressors. Discussed critical nature of situation with wife who verbalized understanding     * Cardiac arrest  - PEA arrest 6/9 with ACLS with ROSC; reports of VTach- treated with  IV Amiodarone  drip- discontinuation due to prolonged QTC  - QTC improved; no recurrent ventricular tachyarrhythmias; intermittent recurrent afib with RVR requiring resumption of IV Amiodarone- back in NSR will transition to oral Amiodarone and remains in NSR  - recurrent cardiac arrest 6/24 with PEA followed by VFib resolved after one shock; remains on oral Amiodarone; last EKG with  6/21 repeat EKG pending;POCUS with large pericardial effusion s/p bedside pericardiocentesis and K+ 6.5 as well ?post code- anticipate reversal vs CRRT      Shock liver  - AST 1400 ALT 1377 total bili 1.3  - shock liver in setting of cardiac arrest and hypotension  - if further trend up may need to hold statin therapy as well as Amiodarone     Pericardial effusion with cardiac tamponade  - cardiac arrest this AM- PEA progressed to Vfib s/p DCCV with ROSC; 15+ minutes until ROSC  - POCUS with large pericardial effusion with tamponade physiology; bedside pericardiocentesis with 1.3L removed and drain in place; will plan for limited echo bedside tomorrow      Prolonged Q-T interval on ECG  - EKG earlier in admission with  with holding of  Amiodarone;resumed with recurrent afib with RVR; most recent EKG 6/21 with   - will monitor QTC with Amiodarone use     NSTEMI (non-ST elevated myocardial infarction)  - presented with chest pain; initial troponin 0.1-0.3 with trend up to 1.9 after cardiac arrest on 6/9; repeat troponin this AM  post arreast 0.024  - history of CAD; will continue to defer  LHC recurrent given cardiac arrest with guarded condition and concern for poor prognosis given recurrent arrest    - no plans for LHC as of now     HFrEF (heart failure with reduced ejection fraction)  - echo with EF 30-35%   - on BB and Hydralazine as an outpatient- on hold due to pressor use     Atherosclerosis of native coronary artery of native heart with unstable angina pectoris  - extensive PCI history with LAD and RCA PCI 3/2019, LCX 2019, LAD REZA 2023, LCX PCI 5/2023, LCX PTCA lithotripsy 9/2024 LCX PTCA 1/2025  -  presented with chest pain with concern for [place]/NSTEMI LHC deferred due to cardiac arrest  - plan for medical management for now with DAPT, statin; will plan to continue medical management for now; no plans for invasive strategy; patient very debilitated with recurrent cardiac arrest     Paroxysmal atrial fibrillation  - history of PAF with EKG in 2023 with PAF; EKGs this admission with NSR; intermittent recurrent episodes of afib with RVR throughout admission   - remains in  NSR;  transitioned to oral Amiodarone 400mg po BID x 10 days then 200mg daily thereafter; last EKG with  6/21 repeat EKG pending; will plan to continue Amiodarone for now unless QTC prolongs further   - no OAC on hold due to anemia  - CHADSVAS score 3 (CHF, HTN, PAD)    Anemia due to chronic kidney disease, on chronic dialysis  - H&H 7.5&24.2 this AM down from 8-9/25-28 previous days  - anemia felt to be related to AOCD; remains on DAPT; will continue to hold OAC and IV Heparin   - monitor H&H closely    Primary hypertension  - BP 100s-130s overnight  - was on ARB, BB and Hydralazine as an outpatient- meds on hold due to hypotension over the weekend as well as pressor use currently         VTE Risk Mitigation (From admission, onward)           Ordered     heparin (porcine) injection 2,400 Units  As needed (PRN)         06/20/25 1729     IP VTE HIGH RISK  PATIENT  Once         06/07/25 1155     Place sequential compression device  Until discontinued         06/07/25 1155                  > 40 minutes was spent in the care of this patient for evaluation, critical thinking and decision making. Also discussion with patient's wife as to present condition. Not all time was spent in direct face to face with patient as time was spent reviewing ECGs, CXR, labs, medications and past studies.     BAO Ignacio, ANP  Cardiology  [facility] - Intensive Care

## 2025-06-24 NOTE — PLAN OF CARE
[facility] - Intensive Care  Discharge Final Note    Primary Care Provider: Geeta Coffey MD    Expected Discharge Date: 2025    Final Discharge Note (most recent)       Final Note - 25 1431          Final Note    Assessment Type Final Discharge Note (P)      Anticipated Discharge Disposition  (P)

## 2025-06-24 NOTE — PROGRESS NOTES
Pt arrived to ICU. Shortly after went into v-tach. Shocked once with 200J. Pt with pulse after shock.

## 2025-06-24 NOTE — ASSESSMENT & PLAN NOTE
Patient had cardiac arrest requiring ACLS x3 on 6/9 with remarkable improvement and extubation on 06/11  - again with PA cardiac arrest on morning 6/24  - etiology of cardiac arrest likely hyperkalemia versus cardiac tamponade; see further discussion below

## 2025-06-24 NOTE — SUBJECTIVE & OBJECTIVE
Review of Systems   Unable to perform ROS: Intubated     Objective:     Vital Signs (Most Recent):  Temp: (!) 93.7 °F (34.3 °C) (06/24/25 1105)  Pulse: 82 (06/24/25 1220)  Resp: (!) 33 (06/24/25 1220)  BP: (!) 76/50 (06/24/25 1220)  SpO2: 100 % (06/24/25 1220) Vital Signs (24h Range):  Temp:  [93.7 °F (34.3 °C)-96.8 °F (36 °C)] 93.7 °F (34.3 °C)  Pulse:  [] 82  Resp:  [12-39] 33  SpO2:  [60 %-100 %] 100 %  BP: ()/() 76/50  Arterial Line BP: ()/(42-91) 120/60     Weight: 75.8 kg (167 lb)  Body mass index is 23.96 kg/m².     SpO2: 100 %         Intake/Output Summary (Last 24 hours) at 6/24/2025 1244  Last data filed at 6/24/2025 1233  Gross per 24 hour   Intake 2245.54 ml   Output 1650 ml   Net 595.54 ml       Lines/Drains/Airways       Central Venous Catheter Line  Duration             Trialysis (Dialysis) Catheter 06/24/25 0742 right femoral <1 day              Drain  Duration                  Drain/Device  06/24/25 0825 medial upper quadrant other (see comments) <1 day              Airway  Duration                  Airway - Non-Surgical 06/24/25 0653 Endotracheal Tube <1 day              Arterial Line  Duration             Arterial Line 06/24/25 0723 Right Brachial <1 day              Intraosseous Line  Duration                  Intraosseous Line 06/24/25 0749 Fibula <1 day         Intraosseous Line 06/24/25 0754 Humerus <1 day              Peripheral Intravenous Line  Duration                  Hemodialysis AV Fistula Left upper arm -- days    Peripheral IV Single Lumen 06/19/25 1530 20 G 1/2 in Anterior;Right Forearm 4 days                       Physical Exam  Constitutional:       General: He is not in acute distress.     Appearance: He is well-developed. He is ill-appearing.   Neck:      Vascular: No JVD.   Cardiovascular:      Rate and Rhythm: Normal rate and regular rhythm.      Heart sounds: No murmur heard.     No gallop.   Pulmonary:      Effort: Pulmonary effort is normal. No  respiratory distress.      Breath sounds: Normal breath sounds. No wheezing.   Abdominal:      General: Bowel sounds are normal. There is no distension.      Palpations: Abdomen is soft.      Tenderness: There is no abdominal tenderness.   Musculoskeletal:      Cervical back: Normal range of motion and neck supple.   Skin:     General: Skin is warm and dry.   Psychiatric:         Behavior: Behavior normal.         Thought Content: Thought content normal.         Judgment: Judgment normal.              Significant Imaging: Echocardiogram: Transthoracic echo (TTE) complete (Cupid Only):   Results for orders placed or performed during the hospital encounter of 06/07/25   Echo   Result Value Ref Range    LA area A4C 12.62 cm2    LV ESV A4C 22.64 mL    Triscuspid Valve Regurgitation Peak Gradient 34 mmHg    RA Vol 56.35 mL    LV Diastolic Volume 85 mL    LV Systolic Volume 50 mL    TR Max Jakob 2.9 m/s    RA area length vol 55.02 mL    RV- kang basal diam 4.4 cm    RA Area 18.3 cm2    RV S' 6.85 cm/s    TAPSE 0.8 cm    Ascending aorta 3.6 cm    Sinus 3.9 cm    LVIDs 3.5 2.1 - 4.0 cm    PW 1.6 (A) 0.6 - 1.1 cm    IVS 1.4 (A) 0.6 - 1.1 cm    LVIDd 4.3 3.5 - 6.0 cm    PV PEAK VELOCITY 0.68 m/s    TDI LATERAL 0.06 m/s    Left Ventricular End Systolic Volume by Teichholz Method 49.80 mL    Left Ventricular End Diastolic Volume by Teichholz Method 85.10 mL    RV/LV Ratio 1.02 cm    ASI 2.0 cm/m2    FS 18.6 (A) 28 - 44 %    LV mass 258.1 g    ZLVIDD -2.40     ZLVIDS 0.32     Left Ventricle Relative Wall Thickness 0.74 cm    PV peak gradient 2 mmHg    LV Systolic Volume Index 25.9 mL/m2    LV Diastolic Volume Index 44.04 mL/m2    LV Mass Index 133.7 g/m2    RA vol index 28.51 mL/m2    ASI 1.9 cm/m2    BSA 1.93 m2

## 2025-06-24 NOTE — PROGRESS NOTES
06/24/25 1217   Treatment   Treatment Type CVVH   Treatment Status New start   Dialysis Machine Number [id number]   Dialyzer Time (hours) 72   Solutions Labeled and Current  Yes   Access Temporary Cath;Right;Femoral   Catheter Dressing Intact  Yes   Alarms Engaged Yes   CRRT Comments CVVH iniitated per MD orders. All questions answered from primary nurse.   Prescription   Time (Hours) Continuous   Dialysate K + (mEq/L) 2   Dialysate CA + (mEq/L) 3   Dialysate HCO3 - (Bicarb) (mEq/L) 35   Dialysate Na + (mEq/L) 145   Cartridge Type Other   Dialysate Flow Rate (mL/min)   ()   UF Goal Rate 0 mL/hr   CRRT Hourly Documentation   Blood Flow (mL/min) 200   UF Rate 202 cc/hr   Arterial Pressure (mmHg) -45 mmHg   Venous Pressure (mmHg) 90 mmHg   Effluent Pressure (EP) (mmHg) 97 mmHg   Balance Chamber  164   Total UF (Hourly Cleared) (mL) 0

## 2025-06-24 NOTE — ASSESSMENT & PLAN NOTE
Date of surgery: 8.16.23  Date kit picked up: 8.14.23  Indicate if patient received the full kit or CHG wash only: Full     Patient was not able to get dialysis 06/20 because of inability to access his AV fistula  Temp cath was placed and patient got HD 06/20 -21 , cath removed 06/22   AVF declot by IR 06/21 and another declot and stent placement on 06/23  -may be worth evaluating function given persistent hyperkalemia despite dialysis

## 2025-06-24 NOTE — ASSESSMENT & PLAN NOTE
- PEA arrest 6/9 with ACLS with ROSC; reports of VTach- treated with  IV Amiodarone  drip- discontinuation due to prolonged QTC  - QTC improved; no recurrent ventricular tachyarrhythmias; intermittent recurrent afib with RVR requiring resumption of IV Amiodarone- back in NSR will transition to oral Amiodarone and remains in NSR  - recurrent cardiac arrest 6/24 with PEA followed by VFib resolved after one shock; remains on oral Amiodarone; last EKG with  6/21 repeat EKG pending;POCUS with large pericardial effusion s/p bedside pericardiocentesis and K+ 6.5 as well ?post code- anticipate reversal vs CRRT

## 2025-06-24 NOTE — PLAN OF CARE
0730  JUAREZ was informed by Dr Garcia that the pt coded this AM & is being transferred to the ICU. Message sent to [facility] informing of above.     0845  Pt coding again. MD & JUAREZ at the beside. Awaiting arrival of pt's spouse.    1030  Pt intubated & sedated with pressors in use at this time. Dr Garcia informed the pt's spouse, Karmen Gross ([phone]), at the bedside of pt's prognosis & options. MD to start CRRT & try to wean pressor.     1405  JUAREZ was informed by Dr Garcia that the pt  when attempting to initiate CRRT.    1435  Message sent to [facility] informing of above.       Will continue to follow.

## 2025-06-24 NOTE — PROCEDURES
Intervention:   Pericardiocentesis:  Called to bedside post PEA arrest.  Patient found to have large pericardial effusion.  The chest was prepped with sterile solution.  A micropuncture needle was inserted in sub xiphoid process and aimed towards the pericardium until return of pericardial fluid was noted.  A wire was introduced.  This was visualized under TTE.  Next a microcatheter was placed over the wire.  Agitated saline was injected in the pericardial space and noted on TTE.  The tract was dilated using an 8 Cypriot dilator.  An 8.5 Cypriot pigtail drain was placed in the subcostal.  Proximally 1.3 L of fluid was drained.  Sample was sent often analysis.  The drain was sutured and secured in place.    Assessment:   PEA arrest secondary to tamponade status post successful subxiphoid pericardiocentesis with drain placement.    Plan:  -maintain drain to gravity   -repeat limited TTE in morning    Frieda Power M.D.  Interventional Cardiology   [facility]

## 2025-06-24 NOTE — ASSESSMENT & PLAN NOTE
Patient is likely to have ischemic hepatitis as evidenced by abnormalities in AST, ALT, and T. BILI. Etiology includes cardiogenic shock. Continue to monitor liver function while treating underlying condition. Daily labs to include CMP, Liver Function Panel, and PT/INR.    AST   Date Value Ref Range Status   06/24/2025 1,400 (H) 11 - 45 unit/L Final     ALT   Date Value Ref Range Status   06/24/2025 1,377 (H) 10 - 44 unit/L Final

## 2025-06-24 NOTE — ASSESSMENT & PLAN NOTE
- cardiac arrest this AM- PEA progressed to Vfib s/p DCCV with ROSC; 15+ minutes until ROSC  - POCUS with large pericardial effusion with tamponade physiology; bedside pericardiocentesis with 1.3L removed and drain in place; will plan for limited echo bedside tomorrow

## 2025-06-24 NOTE — SIGNIFICANT EVENT
Pt transferred to ICU from 455 post cardiac arrest. Increasing pressor requirements. Pericardiocentesis performed by Dr Mcclain and over 1300cc of blood removed. Increasing pressor requirements and continues to have abnormal labs. Dr Garcia and Pulmonary team had goals of care discussion with family and decided to go to comfort care. IDRIS notified and stated to hold off on turning off anything until after the screening process.

## 2025-06-25 LAB — ACID FAST MOD KINY STN SPEC: NORMAL

## 2025-06-27 LAB
BACTERIA SPEC ANAEROBE CULT: NORMAL
ESTROGEN SERPL-MCNC: NORMAL PG/ML
INSULIN SERPL-ACNC: NORMAL U[IU]/ML
LAB AP GROSS DESCRIPTION: NORMAL
LAB AP NON-GYN INTERPRETATION SPECIMEN 1: NORMAL
LAB AP PERFORMING LOCATION(S): NORMAL

## 2025-06-28 LAB — BACTERIA SPEC AEROBE CULT: NO GROWTH

## (undated) DEVICE — KIT GLIDESHEATH SLEND 7FR 10CM

## (undated) DEVICE — KIT INTRODUCER W/GUIDEWIRE

## (undated) DEVICE — CATH EAGLE EYE ST .014X20X150

## (undated) DEVICE — GUIDE LAUNCHER 6FR EBU 3.5

## (undated) DEVICE — DEVICE PICC SECURE SORBA VIEW

## (undated) DEVICE — GUIDEWIRE VERRATA + JTIP 185CM

## (undated) DEVICE — GUIDEWIRE COUGAR XT 190 ST HY

## (undated) DEVICE — SUT VICRYL 3-0 27 SH

## (undated) DEVICE — CATH NC EMERGE MR 2.5X15MM

## (undated) DEVICE — HEMOSTAT VASC BAND REG 24CM

## (undated) DEVICE — SPIKE SHORT LG BORE 1-WAY 2IN

## (undated) DEVICE — GLOVE SURGICAL LATEX SZ 8

## (undated) DEVICE — KIT GLIDESHEATH SLEND 6FR 10CM

## (undated) DEVICE — KIT LEFT HEART MANIFOLD CUSTOM

## (undated) DEVICE — CATH FL 3.5 5FR

## (undated) DEVICE — DEFIBRILLATOR STAT PADZ II

## (undated) DEVICE — GLIDESHEATH SLENDER SS 5FR10CM

## (undated) DEVICE — Device

## (undated) DEVICE — GUIDE LAUNCHER 6FR AR 1.0

## (undated) DEVICE — VISIPAQUE 320 200ML +PK

## (undated) DEVICE — DRESSING TRANS 4X4 3/4

## (undated) DEVICE — CATH NC EMERGE MR 3X12MM

## (undated) DEVICE — PAD DEFIB CADENCE ADULT R2

## (undated) DEVICE — GOWN POLY REINF BRTH SLV XL

## (undated) DEVICE — CATH IMPULSE 5F 100CM FR4

## (undated) DEVICE — SYR SLIP TIP 1CC

## (undated) DEVICE — VALVE GUARDIAN II HEMOSTASIS

## (undated) DEVICE — CATH IMPULSE 5FR PIGTAIL 125CM

## (undated) DEVICE — CATH NC EMERGE MR 3X15MM

## (undated) DEVICE — SET MICRO PUNCT 4FR/MPIS-401

## (undated) DEVICE — CONTRAST VISIPAQUE 150ML

## (undated) DEVICE — DRAPE C-ARM/MOBILE XRAY 44X80

## (undated) DEVICE — GUIDEWIRE FIELDER XT.014X190CM

## (undated) DEVICE — SYR 10CC LUER LOCK

## (undated) DEVICE — GUIDEWIRE ADVNTG 035X260CM ANG

## (undated) DEVICE — CANNULA VSL W/DUCKBILL

## (undated) DEVICE — CATH MINI TREK RX 1.5MM X 12MM

## (undated) DEVICE — GUIDEWIRE RUNTHROUGH EF 180CM

## (undated) DEVICE — CATH LUTONIX DCB 018X130X6X150

## (undated) DEVICE — CATH LUTONIX DCB 018X130X6X220

## (undated) DEVICE — COVER PROBE US 5.5X58L NON LTX

## (undated) DEVICE — HEMOSTAT VASC BAND LONG 27CM

## (undated) DEVICE — APPLICATOR CHLORAPREP ORN 26ML

## (undated) DEVICE — TUBING HPCIL ROT M/F ADPT 48IN

## (undated) DEVICE — GUIDEWIRE STD .035X260CM ANG

## (undated) DEVICE — SUT 4-0 ETHILON 18 PS-2

## (undated) DEVICE — CATH EMERGE MR 8 X 2.50

## (undated) DEVICE — GUIDEWIRE FIELDER XT.014X300CM

## (undated) DEVICE — VALVE CONTROL COPILOT

## (undated) DEVICE — INFLATOR ENCORE 26 BLLN INFL

## (undated) DEVICE — DRAPE INCISE IOBAN 2 23X17IN

## (undated) DEVICE — CASSETTE INFINITI

## (undated) DEVICE — DRAPE T TRNSVRS LAP 102X78X121

## (undated) DEVICE — CATH DIAMONDBACK CORONARY 360

## (undated) DEVICE — SHEATH MICROPUNCTURE PEDAL 4FR

## (undated) DEVICE — CATH IMA INFINITI 4FRX100CM

## (undated) DEVICE — GUIDE LAUNCHER 6FR JR 4.0

## (undated) DEVICE — CATH GLIDECATH PV MLTCRV 4FR

## (undated) DEVICE — MANIFOLD 4 PORT

## (undated) DEVICE — GUIDEWIRE COUGAR XT 300 ST HY

## (undated) DEVICE — ELECTRODE REM PLYHSV RETURN 9

## (undated) DEVICE — GUIDEWIRE WHOLEY STD STR 260CM

## (undated) DEVICE — CATH NAVICROSS .035X150 ANGLE

## (undated) DEVICE — CATH ANGIOSCULPT EVO 2.5X10MM

## (undated) DEVICE — CATH STERLING MR 7X60X135

## (undated) DEVICE — KIT COPILOT VALVE HEMO TOOL

## (undated) DEVICE — BLADE SURG CARBON STEEL SZ11

## (undated) DEVICE — DRESSING XEROFORM NONADH 1X8IN

## (undated) DEVICE — GUIDE LAUNCHER 7FR EBU 3.5

## (undated) DEVICE — CATH SHOCKWAVE C2+ IVL 2.5X12M

## (undated) DEVICE — SEE MEDLINE ITEM 157187

## (undated) DEVICE — SHEATH INTRODUCER 6FR 11CM

## (undated) DEVICE — SUT MCRYL PLUS 4-0 PS2 27IN

## (undated) DEVICE — BLLN SC EUPHORA RX 2.50MMX20MM

## (undated) DEVICE — GLOVE BIOGEL SKINSENSE PI 6.5

## (undated) DEVICE — CATH DMNDBK SYS CLASS 2.0X145

## (undated) DEVICE — BOOT SUTURE AID

## (undated) DEVICE — CATH EMERGE MR 20 X 2.50

## (undated) DEVICE — STOCKINET TUBULAR 1 PLY 6X60IN

## (undated) DEVICE — CATH EMERGE MR 15 X 2.50

## (undated) DEVICE — GUIDEWIRE EMERALD .035IN 260CM

## (undated) DEVICE — TUBING SET ANGIASSIST MED GAS

## (undated) DEVICE — GUIDWIRE HI-TORQUE .018X300CM

## (undated) DEVICE — GUIDEWIRE TORQUE DEVICE

## (undated) DEVICE — PACK SURGERY START

## (undated) DEVICE — SOL BETADINE 5%

## (undated) DEVICE — BANDAGE ROLL COTTN 4.5INX4.1YD

## (undated) DEVICE — GLOVE BIOGEL SKINSENSE PI 7.5

## (undated) DEVICE — CATH EMERGE MR 12 X 3.00

## (undated) DEVICE — SHEATH INTRODUCER 4FR 11CM

## (undated) DEVICE — DRAPE ANGIO BRACH 38X44IN

## (undated) DEVICE — WIRE MANDRIL 98/60CM

## (undated) DEVICE — GOWN POLY REINF BRTH SLV LG

## (undated) DEVICE — GUIDEWIRE SION BLUE STR 190CM

## (undated) DEVICE — DRAPE HAND STERILE

## (undated) DEVICE — CATH EMERGE MR 12 X 2.00

## (undated) DEVICE — DRESSING TEGADERM 2 3/8 X 2.75

## (undated) DEVICE — CATH NC EMERGE MR 3.5X12MM

## (undated) DEVICE — PAD PREP CUFFED NS 24X48IN

## (undated) DEVICE — SYR 30CC LUER LOCK

## (undated) DEVICE — VISE RADIFOCUS MULTI TORQUE

## (undated) DEVICE — NDL 22GA X1 1/2 REG BEVEL

## (undated) DEVICE — CATH JACKY RADIAL 3.5 110CM

## (undated) DEVICE — SHEATH INTRODUCER 7FR 11CM

## (undated) DEVICE — CATH NC EMERGE MR 2.5X12MM

## (undated) DEVICE — GUIDEWIRE PILOT 50X190

## (undated) DEVICE — GUIDEWIRE HI TORQUE 3X300CM

## (undated) DEVICE — COVER BAND BAG 40 X 40

## (undated) DEVICE — DRESSING ANTIMICROBIAL 1 INCH

## (undated) DEVICE — GUIDEWIRE PROWATER .014X180CM

## (undated) DEVICE — GAUZE WOVEN STRL 8PLY 2X2IN

## (undated) DEVICE — TUBE CONTRAST SQUEEZE

## (undated) DEVICE — PACK ECLIPSE BASIC III SURG

## (undated) DEVICE — PADS RADI PERIPHERAL SHIELD

## (undated) DEVICE — COVER OVERHEAD SURG LT BLUE

## (undated) DEVICE — DECANTER FLUID TRNSF WHITE 9IN

## (undated) DEVICE — CATH JACKY RADIAL 5FR 100CM

## (undated) DEVICE — SHEATH FLEXOR ANSEL 5FR 110CM

## (undated) DEVICE — COVER PROBE 6X48

## (undated) DEVICE — CATH CXI STR 2.6F .018IN 90CM

## (undated) DEVICE — CATH OPTITORQUE TIGER 5F 100CM

## (undated) DEVICE — SUT PROLENE 6-0 24 BV-1

## (undated) DEVICE — BLLN SC EUPHORA RX 2.50MMX12MM

## (undated) DEVICE — CATH EAGLE EYE PLATINUM

## (undated) DEVICE — APPLIER LIGACLIP SM 9.38IN

## (undated) DEVICE — SPONGE COTTON TRAY 4X4IN

## (undated) DEVICE — CATH EMERGE MR 20 X 3.00

## (undated) DEVICE — CATH IMPULSE D6F MPA-2 5BX

## (undated) DEVICE — CATH NC EMERGE MR 2X8MM

## (undated) DEVICE — GLASSES EYE PROTECTIVE

## (undated) DEVICE — SYR MARK 7 ARTERION 150ML

## (undated) DEVICE — CATH ANG PIGTAIL 4FR INFINITY

## (undated) DEVICE — JELLY LUBRICANT STERILE 4 OZ

## (undated) DEVICE — CATH NC EMERGE MR 3.5X20MM

## (undated) DEVICE — DRAPE THYROID SOFT STERILE

## (undated) DEVICE — CATH EMERGE MR 8 X 3.50

## (undated) DEVICE — CATH MICRO CORSAIR PRO 150CM

## (undated) DEVICE — SCALPEL SZ 11 RETRACTABLE

## (undated) DEVICE — CATH GUIDE LINER  V3 6F

## (undated) DEVICE — CONTAINER SPECIMEN STRL 3OZ

## (undated) DEVICE — WIRE DOC EXTENSION

## (undated) DEVICE — CATH EMERGE MR 15 X 2.00

## (undated) DEVICE — GUIDE WIRE BMW 014 X190

## (undated) DEVICE — CATH IMPULSE FL4 5FR 100CM

## (undated) DEVICE — CATH ULTRAVERSE  018 6X4X130

## (undated) DEVICE — CATH MPA2 INFINITI 4FR 100CM

## (undated) DEVICE — CATH LASER POINT ELCA 9 X 80

## (undated) DEVICE — GUIDEWIRE EMERALD 150CM PTFE

## (undated) DEVICE — GUIDEWIRE INTRODUCER

## (undated) DEVICE — CATH TURNPIKE LP 135CM

## (undated) DEVICE — CATH TREK RX 2.50MM X 12MM

## (undated) DEVICE — CATH EMERGE MR 12 X 2.50

## (undated) DEVICE — DEVICE HAWKONE M 3.0-7.0MM 6FR

## (undated) DEVICE — TIE VAS SIL RUBBER MAXI BLU ST

## (undated) DEVICE — SUT ETHILON 3-0 PS2 18 BLK

## (undated) DEVICE — DRAPE THREE-QTR REINF 53X77IN

## (undated) DEVICE — GLOVE BIOGEL ECLIPSE SZ 7.5

## (undated) DEVICE — SHEATH DESTINATION 7F X 45CM

## (undated) DEVICE — SYR B-D DISP CONTROL 10CC100/C

## (undated) DEVICE — CATH MINI TREK RX 2.0MM X 12MM

## (undated) DEVICE — BLLN NC EUPHORA 3.5X12MM

## (undated) DEVICE — GUIDEWIRE WHISPER ES .014X190

## (undated) DEVICE — BLLN NC EUPHORA RX 30 X 20

## (undated) DEVICE — CATH IMPULSE PIGTAIL 5FR 125CM

## (undated) DEVICE — NDL HYPO REG 25G X 1 1/2

## (undated) DEVICE — GUIDEWIRE VIPER COR .014X325CM

## (undated) DEVICE — TOWEL OR DISP STRL BLUE 4/PK

## (undated) DEVICE — GUIDEWIRE VIPERWIRE 14 335 145

## (undated) DEVICE — CATH ULTRAVERSE 018 6X30X130

## (undated) DEVICE — LUBRICANT VIPERSLIDE 100ML BAG

## (undated) DEVICE — GUIDEWIRE V-18 CONTROL .18

## (undated) DEVICE — CATH ULTRAVERSE 018 6X100X130

## (undated) DEVICE — TIE VAS SIL RUBBER MINI WHT ST

## (undated) DEVICE — CATH ULTRAVERSE 018 5X300X150